# Patient Record
Sex: FEMALE | Race: WHITE | NOT HISPANIC OR LATINO | URBAN - METROPOLITAN AREA
[De-identification: names, ages, dates, MRNs, and addresses within clinical notes are randomized per-mention and may not be internally consistent; named-entity substitution may affect disease eponyms.]

---

## 2019-08-01 ENCOUNTER — INPATIENT (INPATIENT)
Facility: HOSPITAL | Age: 69
LOS: 13 days | Discharge: HOME HEALTH | End: 2019-08-15
Attending: HOSPITALIST | Admitting: HOSPITALIST
Payer: OTHER MISCELLANEOUS

## 2019-08-01 VITALS
WEIGHT: 134.92 LBS | TEMPERATURE: 98 F | SYSTOLIC BLOOD PRESSURE: 184 MMHG | RESPIRATION RATE: 18 BRPM | HEART RATE: 68 BPM | HEIGHT: 63 IN | OXYGEN SATURATION: 99 % | DIASTOLIC BLOOD PRESSURE: 74 MMHG

## 2019-08-01 LAB
ALBUMIN SERPL ELPH-MCNC: 3.8 G/DL — SIGNIFICANT CHANGE UP (ref 3.5–5.2)
ALP SERPL-CCNC: 120 U/L — HIGH (ref 30–115)
ALT FLD-CCNC: 9 U/L — SIGNIFICANT CHANGE UP (ref 0–41)
ANION GAP SERPL CALC-SCNC: 17 MMOL/L — HIGH (ref 7–14)
ANION GAP SERPL CALC-SCNC: 18 MMOL/L — HIGH (ref 7–14)
APTT BLD: 23.9 SEC — CRITICAL LOW (ref 27–39.2)
AST SERPL-CCNC: 23 U/L — SIGNIFICANT CHANGE UP (ref 0–41)
B-OH-BUTYR SERPL-SCNC: 0.6 MMOL/L — HIGH
BASE EXCESS BLDV CALC-SCNC: 5.8 MMOL/L — HIGH (ref -2–2)
BASOPHILS # BLD AUTO: 0.03 K/UL — SIGNIFICANT CHANGE UP (ref 0–0.2)
BASOPHILS NFR BLD AUTO: 0.3 % — SIGNIFICANT CHANGE UP (ref 0–1)
BILIRUB SERPL-MCNC: 0.3 MG/DL — SIGNIFICANT CHANGE UP (ref 0.2–1.2)
BLD GP AB SCN SERPL QL: SIGNIFICANT CHANGE UP
BUN SERPL-MCNC: 21 MG/DL — HIGH (ref 10–20)
BUN SERPL-MCNC: 24 MG/DL — HIGH (ref 10–20)
CA-I SERPL-SCNC: 1.23 MMOL/L — SIGNIFICANT CHANGE UP (ref 1.12–1.3)
CALCIUM SERPL-MCNC: 10.1 MG/DL — SIGNIFICANT CHANGE UP (ref 8.5–10.1)
CALCIUM SERPL-MCNC: 9.6 MG/DL — SIGNIFICANT CHANGE UP (ref 8.5–10.1)
CHLORIDE SERPL-SCNC: 90 MMOL/L — LOW (ref 98–110)
CHLORIDE SERPL-SCNC: 95 MMOL/L — LOW (ref 98–110)
CO2 SERPL-SCNC: 21 MMOL/L — SIGNIFICANT CHANGE UP (ref 17–32)
CO2 SERPL-SCNC: 25 MMOL/L — SIGNIFICANT CHANGE UP (ref 17–32)
CREAT SERPL-MCNC: 1.1 MG/DL — SIGNIFICANT CHANGE UP (ref 0.7–1.5)
CREAT SERPL-MCNC: 1.1 MG/DL — SIGNIFICANT CHANGE UP (ref 0.7–1.5)
EOSINOPHIL # BLD AUTO: 0.12 K/UL — SIGNIFICANT CHANGE UP (ref 0–0.7)
EOSINOPHIL NFR BLD AUTO: 1.2 % — SIGNIFICANT CHANGE UP (ref 0–8)
GAS PNL BLDV: 135 MMOL/L — LOW (ref 136–145)
GAS PNL BLDV: SIGNIFICANT CHANGE UP
GLUCOSE BLDC GLUCOMTR-MCNC: 225 MG/DL — HIGH (ref 70–99)
GLUCOSE BLDC GLUCOMTR-MCNC: 347 MG/DL — HIGH (ref 70–99)
GLUCOSE BLDC GLUCOMTR-MCNC: 441 MG/DL — HIGH (ref 70–99)
GLUCOSE SERPL-MCNC: 274 MG/DL — HIGH (ref 70–99)
GLUCOSE SERPL-MCNC: 553 MG/DL — CRITICAL HIGH (ref 70–99)
HCO3 BLDV-SCNC: 33 MMOL/L — HIGH (ref 22–29)
HCT VFR BLD CALC: 34.7 % — LOW (ref 37–47)
HCT VFR BLDA CALC: 41.2 % — SIGNIFICANT CHANGE UP (ref 34–44)
HGB BLD CALC-MCNC: 13.4 G/DL — LOW (ref 14–18)
HGB BLD-MCNC: 11.8 G/DL — LOW (ref 12–16)
IMM GRANULOCYTES NFR BLD AUTO: 1.6 % — HIGH (ref 0.1–0.3)
INR BLD: 0.97 RATIO — SIGNIFICANT CHANGE UP (ref 0.65–1.3)
INR BLD: 1.1 RATIO — SIGNIFICANT CHANGE UP (ref 0.65–1.3)
LACTATE BLDV-MCNC: 0.9 MMOL/L — SIGNIFICANT CHANGE UP (ref 0.5–1.6)
LYMPHOCYTES # BLD AUTO: 1.13 K/UL — LOW (ref 1.2–3.4)
LYMPHOCYTES # BLD AUTO: 11.1 % — LOW (ref 20.5–51.1)
MCHC RBC-ENTMCNC: 27.1 PG — SIGNIFICANT CHANGE UP (ref 27–31)
MCHC RBC-ENTMCNC: 34 G/DL — SIGNIFICANT CHANGE UP (ref 32–37)
MCV RBC AUTO: 79.6 FL — LOW (ref 81–99)
MONOCYTES # BLD AUTO: 0.39 K/UL — SIGNIFICANT CHANGE UP (ref 0.1–0.6)
MONOCYTES NFR BLD AUTO: 3.8 % — SIGNIFICANT CHANGE UP (ref 1.7–9.3)
NEUTROPHILS # BLD AUTO: 8.33 K/UL — HIGH (ref 1.4–6.5)
NEUTROPHILS NFR BLD AUTO: 82 % — HIGH (ref 42.2–75.2)
NRBC # BLD: 0 /100 WBCS — SIGNIFICANT CHANGE UP (ref 0–0)
PCO2 BLDV: 56 MMHG — HIGH (ref 41–51)
PH BLDV: 7.38 — SIGNIFICANT CHANGE UP (ref 7.26–7.43)
PLATELET # BLD AUTO: 357 K/UL — SIGNIFICANT CHANGE UP (ref 130–400)
PO2 BLDV: 23 MMHG — SIGNIFICANT CHANGE UP (ref 20–40)
POTASSIUM BLDV-SCNC: 3.4 MMOL/L — SIGNIFICANT CHANGE UP (ref 3.3–5.6)
POTASSIUM SERPL-MCNC: 3.5 MMOL/L — SIGNIFICANT CHANGE UP (ref 3.5–5)
POTASSIUM SERPL-MCNC: 4.3 MMOL/L — SIGNIFICANT CHANGE UP (ref 3.5–5)
POTASSIUM SERPL-SCNC: 3.5 MMOL/L — SIGNIFICANT CHANGE UP (ref 3.5–5)
POTASSIUM SERPL-SCNC: 4.3 MMOL/L — SIGNIFICANT CHANGE UP (ref 3.5–5)
PROT SERPL-MCNC: 8 G/DL — SIGNIFICANT CHANGE UP (ref 6–8)
PROTHROM AB SERPL-ACNC: 11.2 SEC — SIGNIFICANT CHANGE UP (ref 9.95–12.87)
PROTHROM AB SERPL-ACNC: 12.6 SEC — SIGNIFICANT CHANGE UP (ref 9.95–12.87)
RBC # BLD: 4.36 M/UL — SIGNIFICANT CHANGE UP (ref 4.2–5.4)
RBC # FLD: 12.3 % — SIGNIFICANT CHANGE UP (ref 11.5–14.5)
SAO2 % BLDV: 37 % — SIGNIFICANT CHANGE UP
SODIUM SERPL-SCNC: 132 MMOL/L — LOW (ref 135–146)
SODIUM SERPL-SCNC: 134 MMOL/L — LOW (ref 135–146)
WBC # BLD: 10.16 K/UL — SIGNIFICANT CHANGE UP (ref 4.8–10.8)
WBC # FLD AUTO: 10.16 K/UL — SIGNIFICANT CHANGE UP (ref 4.8–10.8)

## 2019-08-01 PROCEDURE — 72170 X-RAY EXAM OF PELVIS: CPT | Mod: 26

## 2019-08-01 PROCEDURE — 99231 SBSQ HOSP IP/OBS SF/LOW 25: CPT

## 2019-08-01 PROCEDURE — 99285 EMERGENCY DEPT VISIT HI MDM: CPT

## 2019-08-01 PROCEDURE — 73552 X-RAY EXAM OF FEMUR 2/>: CPT | Mod: 26,LT

## 2019-08-01 PROCEDURE — 93010 ELECTROCARDIOGRAM REPORT: CPT

## 2019-08-01 PROCEDURE — 71045 X-RAY EXAM CHEST 1 VIEW: CPT | Mod: 26

## 2019-08-01 PROCEDURE — 73562 X-RAY EXAM OF KNEE 3: CPT | Mod: 26,LT

## 2019-08-01 RX ORDER — INSULIN GLARGINE 100 [IU]/ML
15 INJECTION, SOLUTION SUBCUTANEOUS AT BEDTIME
Refills: 0 | Status: DISCONTINUED | OUTPATIENT
Start: 2019-08-01 | End: 2019-08-03

## 2019-08-01 RX ORDER — LANOLIN ALCOHOL/MO/W.PET/CERES
5 CREAM (GRAM) TOPICAL AT BEDTIME
Refills: 0 | Status: DISCONTINUED | OUTPATIENT
Start: 2019-08-01 | End: 2019-08-03

## 2019-08-01 RX ORDER — DEXTROSE 50 % IN WATER 50 %
25 SYRINGE (ML) INTRAVENOUS ONCE
Refills: 0 | Status: DISCONTINUED | OUTPATIENT
Start: 2019-08-01 | End: 2019-08-03

## 2019-08-01 RX ORDER — CHLORHEXIDINE GLUCONATE 213 G/1000ML
1 SOLUTION TOPICAL
Refills: 0 | Status: DISCONTINUED | OUTPATIENT
Start: 2019-08-01 | End: 2019-08-03

## 2019-08-01 RX ORDER — MORPHINE SULFATE 50 MG/1
4 CAPSULE, EXTENDED RELEASE ORAL ONCE
Refills: 0 | Status: DISCONTINUED | OUTPATIENT
Start: 2019-08-01 | End: 2019-08-01

## 2019-08-01 RX ORDER — INSULIN HUMAN 100 [IU]/ML
6 INJECTION, SOLUTION SUBCUTANEOUS ONCE
Refills: 0 | Status: COMPLETED | OUTPATIENT
Start: 2019-08-01 | End: 2019-08-01

## 2019-08-01 RX ORDER — SODIUM CHLORIDE 9 MG/ML
1000 INJECTION INTRAMUSCULAR; INTRAVENOUS; SUBCUTANEOUS
Refills: 0 | Status: DISCONTINUED | OUTPATIENT
Start: 2019-08-01 | End: 2019-08-03

## 2019-08-01 RX ORDER — INSULIN LISPRO 100/ML
8 VIAL (ML) SUBCUTANEOUS ONCE
Refills: 0 | Status: COMPLETED | OUTPATIENT
Start: 2019-08-01 | End: 2019-08-01

## 2019-08-01 RX ORDER — ALPRAZOLAM 0.25 MG
0.25 TABLET ORAL THREE TIMES A DAY
Refills: 0 | Status: DISCONTINUED | OUTPATIENT
Start: 2019-08-01 | End: 2019-08-03

## 2019-08-01 RX ORDER — INSULIN LISPRO 100/ML
5 VIAL (ML) SUBCUTANEOUS
Refills: 0 | Status: DISCONTINUED | OUTPATIENT
Start: 2019-08-01 | End: 2019-08-03

## 2019-08-01 RX ORDER — CODEINE SULFATE 60 MG/1
30 TABLET ORAL ONCE
Refills: 0 | Status: DISCONTINUED | OUTPATIENT
Start: 2019-08-01 | End: 2019-08-01

## 2019-08-01 RX ORDER — ASPIRIN/CALCIUM CARB/MAGNESIUM 324 MG
81 TABLET ORAL DAILY
Refills: 0 | Status: DISCONTINUED | OUTPATIENT
Start: 2019-08-01 | End: 2019-08-03

## 2019-08-01 RX ORDER — AMPICILLIN SODIUM AND SULBACTAM SODIUM 250; 125 MG/ML; MG/ML
3 INJECTION, POWDER, FOR SUSPENSION INTRAMUSCULAR; INTRAVENOUS EVERY 6 HOURS
Refills: 0 | Status: DISCONTINUED | OUTPATIENT
Start: 2019-08-01 | End: 2019-08-03

## 2019-08-01 RX ORDER — SODIUM CHLORIDE 9 MG/ML
1000 INJECTION, SOLUTION INTRAVENOUS
Refills: 0 | Status: DISCONTINUED | OUTPATIENT
Start: 2019-08-01 | End: 2019-08-03

## 2019-08-01 RX ORDER — DEXTROSE 50 % IN WATER 50 %
12.5 SYRINGE (ML) INTRAVENOUS ONCE
Refills: 0 | Status: DISCONTINUED | OUTPATIENT
Start: 2019-08-01 | End: 2019-08-03

## 2019-08-01 RX ORDER — DEXTROSE 50 % IN WATER 50 %
15 SYRINGE (ML) INTRAVENOUS ONCE
Refills: 0 | Status: DISCONTINUED | OUTPATIENT
Start: 2019-08-01 | End: 2019-08-03

## 2019-08-01 RX ORDER — HEPARIN SODIUM 5000 [USP'U]/ML
5000 INJECTION INTRAVENOUS; SUBCUTANEOUS EVERY 12 HOURS
Refills: 0 | Status: DISCONTINUED | OUTPATIENT
Start: 2019-08-01 | End: 2019-08-03

## 2019-08-01 RX ORDER — SODIUM CHLORIDE 9 MG/ML
1000 INJECTION, SOLUTION INTRAVENOUS
Refills: 0 | Status: DISCONTINUED | OUTPATIENT
Start: 2019-08-01 | End: 2019-08-01

## 2019-08-01 RX ORDER — MORPHINE SULFATE 50 MG/1
2 CAPSULE, EXTENDED RELEASE ORAL ONCE
Refills: 0 | Status: DISCONTINUED | OUTPATIENT
Start: 2019-08-01 | End: 2019-08-01

## 2019-08-01 RX ORDER — AMPICILLIN SODIUM AND SULBACTAM SODIUM 250; 125 MG/ML; MG/ML
3 INJECTION, POWDER, FOR SUSPENSION INTRAMUSCULAR; INTRAVENOUS ONCE
Refills: 0 | Status: COMPLETED | OUTPATIENT
Start: 2019-08-01 | End: 2019-08-01

## 2019-08-01 RX ORDER — GLUCAGON INJECTION, SOLUTION 0.5 MG/.1ML
1 INJECTION, SOLUTION SUBCUTANEOUS ONCE
Refills: 0 | Status: DISCONTINUED | OUTPATIENT
Start: 2019-08-01 | End: 2019-08-03

## 2019-08-01 RX ORDER — INSULIN LISPRO 100/ML
6 VIAL (ML) SUBCUTANEOUS ONCE
Refills: 0 | Status: COMPLETED | OUTPATIENT
Start: 2019-08-01 | End: 2019-08-01

## 2019-08-01 RX ADMIN — INSULIN HUMAN 6 UNIT(S): 100 INJECTION, SOLUTION SUBCUTANEOUS at 14:41

## 2019-08-01 RX ADMIN — MORPHINE SULFATE 2 MILLIGRAM(S): 50 CAPSULE, EXTENDED RELEASE ORAL at 17:30

## 2019-08-01 RX ADMIN — MORPHINE SULFATE 2 MILLIGRAM(S): 50 CAPSULE, EXTENDED RELEASE ORAL at 20:11

## 2019-08-01 RX ADMIN — MORPHINE SULFATE 4 MILLIGRAM(S): 50 CAPSULE, EXTENDED RELEASE ORAL at 11:25

## 2019-08-01 RX ADMIN — SODIUM CHLORIDE 80 MILLILITER(S): 9 INJECTION INTRAMUSCULAR; INTRAVENOUS; SUBCUTANEOUS at 17:35

## 2019-08-01 RX ADMIN — Medication 6 UNIT(S): at 17:32

## 2019-08-01 RX ADMIN — AMPICILLIN SODIUM AND SULBACTAM SODIUM 200 GRAM(S): 250; 125 INJECTION, POWDER, FOR SUSPENSION INTRAMUSCULAR; INTRAVENOUS at 19:59

## 2019-08-01 RX ADMIN — AMPICILLIN SODIUM AND SULBACTAM SODIUM 200 GRAM(S): 250; 125 INJECTION, POWDER, FOR SUSPENSION INTRAMUSCULAR; INTRAVENOUS at 12:07

## 2019-08-01 RX ADMIN — SODIUM CHLORIDE 125 MILLILITER(S): 9 INJECTION, SOLUTION INTRAVENOUS at 13:35

## 2019-08-01 RX ADMIN — Medication 8 UNIT(S): at 15:06

## 2019-08-01 RX ADMIN — SODIUM CHLORIDE 75 MILLILITER(S): 9 INJECTION, SOLUTION INTRAVENOUS at 15:06

## 2019-08-01 RX ADMIN — Medication 110 MILLIGRAM(S): at 17:31

## 2019-08-01 RX ADMIN — INSULIN GLARGINE 15 UNIT(S): 100 INJECTION, SOLUTION SUBCUTANEOUS at 22:13

## 2019-08-01 RX ADMIN — Medication 5 UNIT(S): at 17:32

## 2019-08-01 RX ADMIN — SODIUM CHLORIDE 125 MILLILITER(S): 9 INJECTION, SOLUTION INTRAVENOUS at 15:08

## 2019-08-01 RX ADMIN — HEPARIN SODIUM 5000 UNIT(S): 5000 INJECTION INTRAVENOUS; SUBCUTANEOUS at 17:36

## 2019-08-01 RX ADMIN — Medication 0.25 MILLIGRAM(S): at 15:33

## 2019-08-01 NOTE — ED PROVIDER NOTE - CARE PLAN
Principal Discharge DX:	Subtrochanteric fracture of left femur  Secondary Diagnosis:	Hyperglycemia Principal Discharge DX:	Subtrochanteric fracture of left femur  Secondary Diagnosis:	Hyperglycemia  Secondary Diagnosis:	Abscess of right leg

## 2019-08-01 NOTE — ED ADULT NURSE NOTE - OBJECTIVE STATEMENT
Patient stated she fell recently and is c/o right hip pain. Patient denies Head trauma LOC N/V CP SOB fevers Chills and urinary Symptoms

## 2019-08-01 NOTE — ED ADULT TRIAGE NOTE - CHIEF COMPLAINT QUOTE
as per ems she fell on the street corner and is having left hip pain. pt denies loc pt denies anticoagulants. pt cannot but any pressure on hip

## 2019-08-01 NOTE — ED PROVIDER NOTE - NS ED ROS FT
Eyes:  No visual changes, eye pain or discharge.  ENMT:  No hearing changes, pain, no sore throat or runny nose, no difficulty swallowing  Cardiac:  No chest pain, SOB or edema. No chest pain with exertion.  Respiratory:  No cough or respiratory distress. No hemoptysis. No history of asthma or RAD.  GI:  No nausea, vomiting, diarrhea or abdominal pain.  :  No dysuria, frequency or burning.  MS:  No myalgia, muscle weakness,  back pain. + joint pain  Neuro:  No headache or weakness.  No LOC.  Skin:  No skin rash.   Endocrine: No history of thyroid disease or diabetes.

## 2019-08-01 NOTE — H&P ADULT - NSHPATTENDINGPLANDISCUSS_GEN_ALL_CORE
Explaining care with patient, and hospital course, and discussing diagnosis/orhto/pt/family/RN/resident

## 2019-08-01 NOTE — ED ADULT NURSE NOTE - NSIMPLEMENTINTERV_GEN_ALL_ED
Implemented All Fall with Harm Risk Interventions:  [place] to call system. Call bell, personal items and telephone within reach. Instruct patient to call for assistance. Room bathroom lighting operational. Non-slip footwear when patient is off stretcher. Physically safe environment: no spills, clutter or unnecessary equipment. Stretcher in lowest position, wheels locked, appropriate side rails in place. Provide visual cue, wrist band, yellow gown, etc. Monitor gait and stability. Monitor for mental status changes and reorient to person, place, and time. Review medications for side effects contributing to fall risk. Reinforce activity limits and safety measures with patient and family. Provide visual clues: red socks.

## 2019-08-01 NOTE — H&P ADULT - ASSESSMENT
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     # S/p mechanical fall, with left hip fracture reported  - xr of hip shows Acute left femoral subtrochanteric fracture with 7 mm diastases.  - ortho consulted,   - CXR, 2d echo ordered for preop  - cardio consulted for stratification   - strict bed rest  - pain management  - NPO after midnight with IV Fluids for possible OR tomorrow for Left hip ORIF    # Right hip groin infection, cellulitis  - c/w unasyn q6h    # DM2, uncontrolled, not on home meds  - will start insulin/lispro/lantus  - blood glucose monitoring  - hba1c ordered    # ho of no medical examination in ten years  - routine bloodwork ordered  - tsh/lipid panel    Activity: bedrest  diet: npo midnight, carb consistent for now  dvt ppx: heparin 5k bid  gi ppx: not indicated  full code  dispo: from home, will need pt/rehab oncedischarged

## 2019-08-01 NOTE — ED PROVIDER NOTE - CLINICAL SUMMARY MEDICAL DECISION MAKING FREE TEXT BOX
Pt with hip fx after fall.  Also with right groin abscess with minimal erythema and draining for days.  Ortho eval in ED.  Pt found to have elevated BS with NL ph.  Fluids ordered.  Pt adm for cont eval and tx.

## 2019-08-01 NOTE — CONSULT NOTE ADULT - SUBJECTIVE AND OBJECTIVE BOX
Called to evaluate  s/p mechanical fall today in ED c/o Right hip pain and inability to ambulate. Denies any other injuries or c/o.  PMHx: HTN, dementia, permanent pacemaker.    on PE: Right LE shortmentd, externally rotated, decreased ROM secondary to pain, no decrased sensation, dorsi/plantarflexes ankles, no calf tenderness, DP pulse 2+.    X-ray: Right hip IT fracture     A/P:  - patient will benefit from admissiom to [facility] services;  - pre-op labs: T&S, CBC, SMA7, PT/INR/PTT;  - CXR;  - strict bed rest;  - Castellon catheter;  - pain management; Called to evaluate a 69 y/o female s/p mechanical fall today, in ED c/o Left hip pain and inability to ambulate. Also reports an abscess in her Right groin that she has been self-medicating with Amoxicillin 850mg X 8 day prior to fall. Does not have a Primary doctor and has not been seen by one for several years.  Self medicating with Furosemide. Reports occasional gait issues with bilateral leg "stiffness."  Denies any other injuries or c/o.    PMHx: CAD, cardiomyopathy, pre-diabetis.    Allergies: NKDA    on PE: Left LE shortened, externally rotated, decreased ROM secondary to pain, no decreased sensation, dorsi/plantarflexes ankles, no calf tenderness, DP pulse 2+.    X-ray: Left hip IT fracture;    A/P:  - patient will benefit from admissiom to [facility] services;  - pre-op labs: T&S, CBC, SMA7, PT/INR/PTT;  - CXR;  - strict bed rest;  - Castellon catheter;  - pain management; Called to evaluate a 67 y/o female s/p mechanical fall today, in ED c/o Left hip pain and inability to ambulate. Also reports an abscess in her Right groin that she has been self-medicating with Amoxicillin 850mg X 8 day prior to fall. Does not have a Primary doctor and has not been seen by one for several years. Mention  as her cardiologist. Self medicating with Furosemide. Reports occasional gait issues with bilateral leg "stiffness and disbalance."  Denies any fever, other injuries or c/o.    PMHx: CAD, cardiomyopathy, pre-diabetis.    Allergies: NKDA    Vital Signs Last 24 Hrs  T(C): 36.6 (01 Aug 2019 09:48), Max: 36.6 (01 Aug 2019 09:48)  T(F): 97.8 (01 Aug 2019 09:48), Max: 97.8 (01 Aug 2019 09:48)  HR: 68 (01 Aug 2019 09:48) (68 - 68)  BP: 184/74 (01 Aug 2019 09:48) (184/74 - 184/74)  BP(mean): --  RR: 18 (01 Aug 2019 09:48) (18 - 18)  SpO2: 99% (01 Aug 2019 09:48) (99% - 99%)    on PE:  Left LE: shortened, externally rotated, decreased ROM secondary to pain, no decreased sensation, dorsi/plantarflexes ankles, no calf tenderness, DP pulse 2+.  Right groin: + resolved abscess opening approximately 7mm with active serous discharge and surrounding erythema.                          11.8   10.16 )-----------( 357      ( 01 Aug 2019 11:11 )             34.7   08-01    132<L>  |  90<L>  |  24<H>  ----------------------------<  553<HH>  4.3   |  25  |  1.1    Ca    10.1      01 Aug 2019 11:11    TPro  8.0  /  Alb  3.8  /  TBili  0.3  /  DBili  x   /  AST  23  /  ALT  9   /  AlkPhos  120<H>  08-01    PT/INR - ( 01 Aug 2019 11:11 )   PT: 11.20 sec;   INR: 0.97 ratio         PTT - ( 01 Aug 2019 11:11 )  PTT:23.9 sec- pre-op labs: T&S, CBC, SMA7,    X-ray: Left hip IT fracture;    A/P:  - patient will benefit from admission to Medical service;  - CXR and ECHOcardiogram;  - strict bed rest;  - pain management;  - address abscess Right groin with IV antibiotics;  - Consult Cardiology () for cardiac pre-op clearance;  - Medical pre-op optimization and clearance;  - NPO after midnight with IV Fluids for possible OR tomorrow for Left hip ORIF;  - case d/w Ortho attending and evaluation to follow. Called to evaluate a 69 y/o female s/p mechanical fall today, in ED c/o Left hip pain and inability to ambulate. Also reports an abscess in her Right groin that she has been self-medicating with Amoxicillin 850mg X 8 day prior to fall. Does not have a Primary doctor and has not been seen by one for several years. Mention  as her cardiologist. Self medicating with Furosemide. Reports occasional gait issues with bilateral leg "stiffness and disbalance."  Denies any fever, other injuries or c/o.    PMHx: CAD, cardiomyopathy, pre-diabetis.    Allergies: NKDA    Vital Signs Last 24 Hrs  T(C): 36.6 (01 Aug 2019 09:48), Max: 36.6 (01 Aug 2019 09:48)  T(F): 97.8 (01 Aug 2019 09:48), Max: 97.8 (01 Aug 2019 09:48)  HR: 68 (01 Aug 2019 09:48) (68 - 68)  BP: 184/74 (01 Aug 2019 09:48) (184/74 - 184/74)  BP(mean): --  RR: 18 (01 Aug 2019 09:48) (18 - 18)  SpO2: 99% (01 Aug 2019 09:48) (99% - 99%)    on PE:  Left LE: shortened, externally rotated, decreased ROM secondary to pain, no decreased sensation, dorsi/plantarflexes ankles, no calf tenderness, DP pulse 2+.  Right groin: + resolved abscess opening approximately 7mm with active serous discharge and surrounding erythema.                          11.8   10.16 )-----------( 357      ( 01 Aug 2019 11:11 )             34.7   08-01    132<L>  |  90<L>  |  24<H>  ----------------------------<  553<HH>  4.3   |  25  |  1.1    Ca    10.1      01 Aug 2019 11:11    TPro  8.0  /  Alb  3.8  /  TBili  0.3  /  DBili  x   /  AST  23  /  ALT  9   /  AlkPhos  120<H>  08-01    PT/INR - ( 01 Aug 2019 11:11 )   PT: 11.20 sec;   INR: 0.97 ratio         PTT - ( 01 Aug 2019 11:11 )  PTT:23.9 sec-     X-ray: Left hip IT fracture;    A/P:  - patient will benefit from admission to Medical service;  - CXR and ECHOcardiogram;  - strict bed rest;  - pain management;  - address abscess Right groin with IV antibiotics;  - Consult Cardiology () for cardiac pre-op clearance;  - Medical pre-op optimization and clearance;  - NPO after midnight with IV Fluids for possible OR tomorrow for Left hip ORIF;  - case d/w Ortho attending and evaluation to follow.

## 2019-08-01 NOTE — ED PROVIDER NOTE - PHYSICAL EXAMINATION
CONSTITUTIONAL: Well-developed; well-nourished; in no acute distress. gcs 15, speaking in full sentences, moving all extremities  SKIN: warm, dry  HEAD: Normocephalic; see above  EYES: PERRL, EOMI, no conjunctival erythema  ENT: No nasal discharge; airway clear, mucous membranes moist  NECK: supple, nontender  CARD: +S1, S2 no murmurs, gallops, or rubs. Regular rate and rhythm. radial/pedal  2+ b/l, no chest wall tenderness or crepitus  RESP: No wheezes, rales or rhonchi. CTABL  ABD: soft ntnd, no rebound, no guarding, no rigidity  EXT: moves all extremities,  No clubbing, cyanosis or edema. Tenderness to palpation of the proximal L. femur. Pelvis stable.   NEURO: Alert, oriented, grossly unremarkable, cn ii-xii grossly intact, follows commands  PSYCH: Cooperative, appropriate. CONSTITUTIONAL: Well-developed; well-nourished; in no acute distress. gcs 15, speaking in full sentences, moving all extremities  SKIN: warm, dry. draining abscess in R. inguinal region  HEAD: Normocephalic; see above  EYES: PERRL, EOMI, no conjunctival erythema  ENT: No nasal discharge; airway clear, mucous membranes moist  NECK: supple, nontender  CARD: +S1, S2 no murmurs, gallops, or rubs. Regular rate and rhythm. radial/pedal  2+ b/l, no chest wall tenderness or crepitus  RESP: No wheezes, rales or rhonchi. CTABL  ABD: soft ntnd, no rebound, no guarding, no rigidity  EXT: moves all extremities,  No clubbing, cyanosis or edema. Tenderness to palpation of the proximal L. femur. Pelvis stable.   NEURO: Alert, oriented, grossly unremarkable, cn ii-xii grossly intact, follows commands  PSYCH: Cooperative, appropriate.

## 2019-08-01 NOTE — PROCEDURE NOTE - NSPOSTCAREGUIDE_GEN_A_CORE
Verbal/written post procedure instructions were given to patient/caregiver/Keep the cast/splint/dressing clean and dry/Instructed patient/caregiver to follow-up with primary care physician/Instructed patient/caregiver regarding signs and symptoms of infection

## 2019-08-01 NOTE — CONSULT NOTE ADULT - ASSESSMENT
68F with right sided groin abscess spontaneously draining    Plan:  Extend groin incision  Pack wound  Send cultures  IV abx    Plan discussed with Dr. Mota

## 2019-08-01 NOTE — CONSULT NOTE ADULT - SUBJECTIVE AND OBJECTIVE BOX
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  As per patient she was walking and tripped on her feet.  She fell and did not hit her head, did not loose consciousness and did not having any incontinence.  Her brother who is at bedside explains that for a long time he has noted that his sister, the patient has ambulatory issues. In the ED patient was complaining of Left hip pain and inability to ambulate. Also reports an abscess in her Right groin that she has been self-medicating with Amoxicillin 850mg X 8 day prior to fall.  Although the patient mentions she has no primary doctor she does explain that she has seen eugenia chapa in the past.  She does not recall any visits to the doctor however within the last 10 years.   Self medicating with Furosemide occasionally when she feels bloated. Reports occasional gait issues with bilateral leg "stiffness and imbalance  Denies any fever, chills, nausea, vomiting, headache, abd pain, chest pain, lower extremity swelling.  No recent sick contacts or decrease in po intake. Surgical consult was placed for evaluation of right groin abscess      PAST MEDICAL & SURGICAL HISTORY:  DM  Cardiomyopathy        MEDICATIONS  (STANDING):  ampicillin/sulbactam  IVPB 3 Gram(s) IV Intermittent every 6 hours  aspirin  chewable 81 milliGRAM(s) Oral daily  chlorhexidine 4% Liquid 1 Application(s) Topical <User Schedule>  dextrose 5%. 1000 milliLiter(s) (50 mL/Hr) IV Continuous <Continuous>  dextrose 50% Injectable 12.5 Gram(s) IV Push once  dextrose 50% Injectable 25 Gram(s) IV Push once  dextrose 50% Injectable 25 Gram(s) IV Push once  doxycycline IVPB      heparin  Injectable 5000 Unit(s) SubCutaneous every 12 hours  insulin glargine Injectable (LANTUS) 15 Unit(s) SubCutaneous at bedtime  insulin lispro Injectable (HumaLOG) 5 Unit(s) SubCutaneous before breakfast  insulin lispro Injectable (HumaLOG) 5 Unit(s) SubCutaneous before lunch  insulin lispro Injectable (HumaLOG) 5 Unit(s) SubCutaneous before dinner  sodium chloride 0.9%. 1000 milliLiter(s) (80 mL/Hr) IV Continuous <Continuous>    MEDICATIONS  (PRN):  ALPRAZolam 0.25 milliGRAM(s) Oral three times a day PRN anxiety  dextrose 40% Gel 15 Gram(s) Oral once PRN Blood Glucose LESS THAN 70 milliGRAM(s)/deciliter  glucagon  Injectable 1 milliGRAM(s) IntraMuscular once PRN Glucose LESS THAN 70 milligrams/deciliter      Allergies    No Known Allergies    Intolerances        REVIEW OF SYSTEMS    [X] A ten-point review of systems was otherwise negative except as noted.  [ ] Due to altered mental status/intubation, subjective information were not able to be obtained from the patient. History was obtained, to the extent possible, from review of the chart and collateral sources of information.      Vital Signs Last 24 Hrs  T(C): 36.4 (01 Aug 2019 16:00), Max: 36.6 (01 Aug 2019 09:48)  T(F): 97.5 (01 Aug 2019 16:00), Max: 97.8 (01 Aug 2019 09:48)  HR: 73 (01 Aug 2019 16:00) (68 - 73)  BP: 141/64 (01 Aug 2019 16:00) (141/64 - 184/74)  BP(mean): --  RR: 18 (01 Aug 2019 16:00) (18 - 18)  SpO2: 99% (01 Aug 2019 16:00) (99% - 99%)    PHYSICAL EXAM:  GENERAL: NAD, well-appearing  CHEST/LUNG: Clear to auscultation bilaterally  HEART: Regular rate and rhythm  ABDOMEN: Soft, Nontender, Nondistended;   EXTREMITIES:  No clubbing, cyanosis, or edema  Groin - small abscess currently self draining via small opening      LABS:  Labs:  CAPILLARY BLOOD GLUCOSE      POCT Blood Glucose.: 347 mg/dL (01 Aug 2019 16:51)  POCT Blood Glucose.: 441 mg/dL (01 Aug 2019 13:55)                          11.8   10.16 )-----------( 357      ( 01 Aug 2019 11:11 )             34.7       Auto Neutrophil %: 82.0 % (08-01-19 @ 11:11)  Auto Immature Granulocyte %: 1.6 % (08-01-19 @ 11:11)    08-01    134<L>  |  95<L>  |  21<H>  ----------------------------<  274<H>  3.5   |  21  |  1.1      Calcium, Total Serum: 9.6 mg/dL (08-01-19 @ 18:39)      LFTs:             8.0  | 0.3  | 23       ------------------[120     ( 01 Aug 2019 11:11 )  3.8  | x    | 9           Lipase:x      Amylase:x         Blood Gas Venous - Lactate: 0.9 mmoL/L (08-01-19 @ 12:17)      Coags:     11.20  ----< 0.97    ( 01 Aug 2019 11:11 )     23.9            RADIOLOGY & ADDITIONAL STUDIES:  < from: Xray Femur 2 Views, Left (08.01.19 @ 12:12) >  Acute left femoral subtrochanteric fracture with 7 mm diastases.    < end of copied text >

## 2019-08-01 NOTE — ED PROVIDER NOTE - OBJECTIVE STATEMENT
68y F w/ no sig PMH presents with L. thigh pain after falling on extremity earlier today. States her foot got stuck on the curb, twisted, and then fell on her L. side. Did not hit head. No LOC. Is unable to move extremity. Denies fever, chills, headache, CP, SOB, n/v/d, abd pain, or urinary/bowel incontinence. 68y F w/ PMH of DM presents with L. thigh pain after falling on extremity earlier today. States her foot got stuck on the curb, twisted, and then fell on her L. side. Did not hit head. No LOC. Is unable to move extremity. Denies fever, chills, headache, CP, SOB, n/v/d, abd pain, or urinary/bowel incontinence.

## 2019-08-01 NOTE — H&P ADULT - ATTENDING COMMENTS
Patient is 69 yo female with hx of Viral myopathy, DM2, and non-compliance  presenting with     1.  Left hip fracture  -Continue with pain management, and further care as per ortho  -TTE, and cardiology consult for medical clearance  -discussed case with ortho, and in agreement to await Blood culture and for infection to clear prior to surgery    2. Right ingrown abscess  -Continue with unasyn, and will add doxycycline pending wound culture, and Blood culture      3.  DM2  -Continue with lantus, and ISS.  Monitor POC

## 2019-08-01 NOTE — CONSULT NOTE ADULT - SUBJECTIVE AND OBJECTIVE BOX
HPI:  67 y/o female w/ pmh of DM2 and non-ischemic cardiomyopathy 12 years ago, paroxysmal A-fib, anxiety, presents s/p mechanical.  As per patient she was walking and tripped on her feet.  She fell and did not hit her head, did not lose consciousness and did not having any incontinence.  Her son who is at bedside explains that for a long time he has noted that his mother, the patient has ambulatory issues. In the ED patient was complaining of Left hip pain and inability to ambulate. Also reports an abscess in her Right groin that she has been self-medicating with Amoxicillin 850mg X 8 day prior to fall.  Although the patient mentions she has no primary doctor she does explain that she has seen dr. Barbosa in the past.  She does not recall any visits to the doctor however within the last 10 years.  Self medicating with Furosemide occasionally when she feels bloated. Reports occasional gait issues with bilateral leg "stiffness and imbalance."  Denies any fever, chills, nausea, vomiting, headache, abd pain, chest pain.  No recent sick contacts. Admits to 15-20 lb weight loss in last "few" months with decreased appetite.      ROS:  All other systems reviewed and are negative    PAST MEDICAL & SURGICAL HISTORY  Non-ischemic cardiomyopathy: Patient reports having EF of 19 at the time. Says she has not followed up since but has returned to baseline  Paroxysmal A-fib not cardioverted- CHADSVASC 4      FAMILY HISTORY:  FAMILY HISTORY: Gastric-cancer- mother      SOCIAL HISTORY:  [+]smoker: 1.5 ppd X 30 years. Quit approx. 12 years ago  []Alcohol  []Drug    ALLERGIES:  No Known Allergies      MEDICATIONS:  MEDICATIONS  (STANDING):  ampicillin/sulbactam  IVPB 3 Gram(s) IV Intermittent every 6 hours  aspirin  chewable 81 milliGRAM(s) Oral daily  chlorhexidine 4% Liquid 1 Application(s) Topical <User Schedule>  dextrose 5%. 1000 milliLiter(s) (50 mL/Hr) IV Continuous <Continuous>  dextrose 50% Injectable 12.5 Gram(s) IV Push once  dextrose 50% Injectable 25 Gram(s) IV Push once  dextrose 50% Injectable 25 Gram(s) IV Push once  heparin  Injectable 5000 Unit(s) SubCutaneous every 12 hours  insulin glargine Injectable (LANTUS) 15 Unit(s) SubCutaneous at bedtime  insulin lispro Injectable (HumaLOG) 5 Unit(s) SubCutaneous before breakfast  insulin lispro Injectable (HumaLOG) 5 Unit(s) SubCutaneous before lunch  insulin lispro Injectable (HumaLOG) 5 Unit(s) SubCutaneous before dinner  insulin lispro Injectable (HumaLOG) 8 Unit(s) SubCutaneous once  lactated ringers 1000 milliLiter(s) (125 mL/Hr) IV Continuous <Continuous>  lactated ringers. 1000 milliLiter(s) (75 mL/Hr) IV Continuous <Continuous>    MEDICATIONS  (PRN):  dextrose 40% Gel 15 Gram(s) Oral once PRN Blood Glucose LESS THAN 70 milliGRAM(s)/deciliter  glucagon  Injectable 1 milliGRAM(s) IntraMuscular once PRN Glucose LESS THAN 70 milligrams/deciliter        HOME MEDICATIONS:  Home Medications:  aspirin 81 mg oral tablet: 1 tab(s) orally once a day (01 Aug 2019 14:37)  ALPRAZolam 0.25 milliGRAM(s) Oral three times a day PRN anxiety      VITALS:   T(F): 97.8 (08-01 @ 09:48), Max: 97.8 (08-01 @ 09:48)  HR: 68 (08-01 @ 09:48) (68 - 68)  BP: 184/74 (08-01 @ 09:48) (184/74 - 184/74)  RR: 18 (08-01 @ 09:48) (18 - 18)  SpO2: 99% (08-01 @ 09:48) (99% - 99%)        PHYSICAL EXAM:  GEN: Alert and oriented X 3, agitated  HEENT: no pallor  NECK: Supple, no JVD  LUNGS: Clear to auscultation bilaterally  CARDIOVASCULAR: S1/S2 regular, holosystolic murmur L sternal boarder   EXT: No Lower extremity edema/cyanosis. Abscess R. groin, healing  NEURO: Non focal  SKIN: Intact    LABS:                        11.8   10.16 )-----------( 357      ( 01 Aug 2019 11:11 )             34.7     08-01    132<L>  |  90<L>  |  24<H>  ----------------------------<  553<HH>  4.3   |  25  |  1.1    Ca    10.1      01 Aug 2019 11:11    TPro  8.0  /  Alb  3.8  /  TBili  0.3  /  DBili  x   /  AST  23  /  ALT  9   /  AlkPhos  120<H>  08-01    PT/INR - ( 01 Aug 2019 11:11 )   PT: 11.20 sec;   INR: 0.97 ratio         PTT - ( 01 Aug 2019 11:11 )  PTT:23.9 sec          Troponin trend: None        Hemoglobin A1C: None  Thyroid: None      RADIOLOGY:  -CXR: < from: Xray Pelvis AP only (08.01.19 @ 12:11) >  Findings/  impression:    Acute left femoral subtrochanteric fracture with 7 mm diastases.     < end of copied text >    -TTE: Pending   -CCTA: None  -STRESS TEST: None  -CATHETERIZATION: None    ECG: Pending    TELEMETRY EVENTS: HPI:  69 y/o female w/ pmh of DM2 and non-ischemic cardiomyopathy 12 years ago, paroxysmal A-fib, anxiety, presents s/p mechanical.  As per patient she was walking and tripped on her feet.  She fell and did not hit her head, did not lose consciousness and did not having any incontinence.  Her son who is at bedside explains that for a long time he has noted that his mother, the patient has ambulatory issues. In the ED patient was complaining of Left hip pain and inability to ambulate. Also reports an abscess in her Right groin that she has been self-medicating with Amoxicillin 850mg X 8 day prior to fall.  Although the patient mentions she has no primary doctor she does explain that she has seen dr. Barbosa in the past.  She does not recall any visits to the doctor however within the last 10 years.  Self medicating with Furosemide occasionally when she feels bloated. Reports occasional gait issues with bilateral leg "stiffness and imbalance."  Denies any fever, chills, nausea, vomiting, headache, abd pain, chest pain.  No recent sick contacts. Admits to 15-20 lb weight loss in last "few" months with decreased appetite.      ROS:  All other systems reviewed and are negative    PAST MEDICAL & SURGICAL HISTORY  Non-ischemic cardiomyopathy: Patient reports having EF of 19 at the time. Says she has not followed up since but has returned to baseline  Paroxysmal A-fib not cardioverted- CHADSVASC 4  Bilateral cataract surgery      FAMILY HISTORY:  FAMILY HISTORY: Gastric-cancer- mother      SOCIAL HISTORY:  [+]smoker: 1.5 ppd X 30 years. Quit approx. 12 years ago  []Alcohol  []Drug    ALLERGIES:  No Known Allergies      MEDICATIONS:  MEDICATIONS  (STANDING):  ampicillin/sulbactam  IVPB 3 Gram(s) IV Intermittent every 6 hours  aspirin  chewable 81 milliGRAM(s) Oral daily  chlorhexidine 4% Liquid 1 Application(s) Topical <User Schedule>  dextrose 5%. 1000 milliLiter(s) (50 mL/Hr) IV Continuous <Continuous>  dextrose 50% Injectable 12.5 Gram(s) IV Push once  dextrose 50% Injectable 25 Gram(s) IV Push once  dextrose 50% Injectable 25 Gram(s) IV Push once  heparin  Injectable 5000 Unit(s) SubCutaneous every 12 hours  insulin glargine Injectable (LANTUS) 15 Unit(s) SubCutaneous at bedtime  insulin lispro Injectable (HumaLOG) 5 Unit(s) SubCutaneous before breakfast  insulin lispro Injectable (HumaLOG) 5 Unit(s) SubCutaneous before lunch  insulin lispro Injectable (HumaLOG) 5 Unit(s) SubCutaneous before dinner  insulin lispro Injectable (HumaLOG) 8 Unit(s) SubCutaneous once  lactated ringers 1000 milliLiter(s) (125 mL/Hr) IV Continuous <Continuous>  lactated ringers. 1000 milliLiter(s) (75 mL/Hr) IV Continuous <Continuous>    MEDICATIONS  (PRN):  dextrose 40% Gel 15 Gram(s) Oral once PRN Blood Glucose LESS THAN 70 milliGRAM(s)/deciliter  glucagon  Injectable 1 milliGRAM(s) IntraMuscular once PRN Glucose LESS THAN 70 milligrams/deciliter        HOME MEDICATIONS:  Home Medications:  aspirin 81 mg oral tablet: 1 tab(s) orally once a day (01 Aug 2019 14:37)  ALPRAZolam 0.25 milliGRAM(s) Oral three times a day PRN anxiety      VITALS:   T(F): 97.8 (08-01 @ 09:48), Max: 97.8 (08-01 @ 09:48)  HR: 68 (08-01 @ 09:48) (68 - 68)  BP: 184/74 (08-01 @ 09:48) (184/74 - 184/74)  RR: 18 (08-01 @ 09:48) (18 - 18)  SpO2: 99% (08-01 @ 09:48) (99% - 99%)        PHYSICAL EXAM:  GEN: Alert and oriented X 3, agitated  HEENT: no pallor  NECK: Supple, no JVD  LUNGS: Clear to auscultation bilaterally  CARDIOVASCULAR: S1/S2 regular, holosystolic murmur L sternal boarder   EXT: No Lower extremity edema/cyanosis. Abscess R. groin, healing  NEURO: Non focal  SKIN: Intact    LABS:                        11.8   10.16 )-----------( 357      ( 01 Aug 2019 11:11 )             34.7     08-01    132<L>  |  90<L>  |  24<H>  ----------------------------<  553<HH>  4.3   |  25  |  1.1    Ca    10.1      01 Aug 2019 11:11    TPro  8.0  /  Alb  3.8  /  TBili  0.3  /  DBili  x   /  AST  23  /  ALT  9   /  AlkPhos  120<H>  08-01    PT/INR - ( 01 Aug 2019 11:11 )   PT: 11.20 sec;   INR: 0.97 ratio         PTT - ( 01 Aug 2019 11:11 )  PTT:23.9 sec          Troponin trend: None        Hemoglobin A1C: None  Thyroid: None      RADIOLOGY:  -CXR: < from: Xray Pelvis AP only (08.01.19 @ 12:11) >  Findings/  impression:    Acute left femoral subtrochanteric fracture with 7 mm diastases.     < end of copied text >    -TTE: Pending   -CCTA: None  -STRESS TEST: None  -CATHETERIZATION: None    ECG: Pending    TELEMETRY EVENTS: HPI: i was called to risk straify her for the hip surgery, unfortunately she has not seen any physician for years, i did find any record of her in our office and presented with high glucose level.     67 y/o female w/ pmh of DM2 and non-ischemic cardiomyopathy 12 years ago, paroxysmal A-fib, anxiety, presents s/p mechanical.  As per patient she was walking and tripped on her feet.  She fell and did not hit her head, did not lose consciousness and did not having any incontinence.  Her son who is at bedside explains that for a long time he has noted that his mother, the patient has ambulatory issues. In the ED patient was complaining of Left hip pain and inability to ambulate. Also reports an abscess in her Right groin that she has been self-medicating with Amoxicillin 850mg X 8 day prior to fall.  Although the patient mentions she has no primary doctor she does explain that she has seen dr. Barbosa in the past.  She does not recall any visits to the doctor however within the last 10 years.  Self medicating with Furosemide occasionally when she feels bloated. Reports occasional gait issues with bilateral leg "stiffness and imbalance."  Denies any fever, chills, nausea, vomiting, headache, abd pain, chest pain.  No recent sick contacts. Admits to 15-20 lb weight loss in last "few" months with decreased appetite.      ROS:  All other systems reviewed and are negative    PAST MEDICAL & SURGICAL HISTORY  Non-ischemic cardiomyopathy: Patient reports having EF of 19 at the time. Says she has not followed up since but has returned to baseline  Paroxysmal A-fib not cardioverted- CHADSVASC 4  Bilateral cataract surgery      FAMILY HISTORY:  FAMILY HISTORY: Gastric-cancer- mother      SOCIAL HISTORY:  [+]smoker: 1.5 ppd X 30 years. Quit approx. 12 years ago  []Alcohol  []Drug    ALLERGIES:  No Known Allergies      MEDICATIONS:  MEDICATIONS  (STANDING):  ampicillin/sulbactam  IVPB 3 Gram(s) IV Intermittent every 6 hours  aspirin  chewable 81 milliGRAM(s) Oral daily  chlorhexidine 4% Liquid 1 Application(s) Topical <User Schedule>  dextrose 5%. 1000 milliLiter(s) (50 mL/Hr) IV Continuous <Continuous>  dextrose 50% Injectable 12.5 Gram(s) IV Push once  dextrose 50% Injectable 25 Gram(s) IV Push once  dextrose 50% Injectable 25 Gram(s) IV Push once  heparin  Injectable 5000 Unit(s) SubCutaneous every 12 hours  insulin glargine Injectable (LANTUS) 15 Unit(s) SubCutaneous at bedtime  insulin lispro Injectable (HumaLOG) 5 Unit(s) SubCutaneous before breakfast  insulin lispro Injectable (HumaLOG) 5 Unit(s) SubCutaneous before lunch  insulin lispro Injectable (HumaLOG) 5 Unit(s) SubCutaneous before dinner  insulin lispro Injectable (HumaLOG) 8 Unit(s) SubCutaneous once  lactated ringers 1000 milliLiter(s) (125 mL/Hr) IV Continuous <Continuous>  lactated ringers. 1000 milliLiter(s) (75 mL/Hr) IV Continuous <Continuous>    MEDICATIONS  (PRN):  dextrose 40% Gel 15 Gram(s) Oral once PRN Blood Glucose LESS THAN 70 milliGRAM(s)/deciliter  glucagon  Injectable 1 milliGRAM(s) IntraMuscular once PRN Glucose LESS THAN 70 milligrams/deciliter        HOME MEDICATIONS:  Home Medications:  aspirin 81 mg oral tablet: 1 tab(s) orally once a day (01 Aug 2019 14:37)  ALPRAZolam 0.25 milliGRAM(s) Oral three times a day PRN anxiety      VITALS:   T(F): 97.8 (08-01 @ 09:48), Max: 97.8 (08-01 @ 09:48)  HR: 68 (08-01 @ 09:48) (68 - 68)  BP: 184/74 (08-01 @ 09:48) (184/74 - 184/74)  RR: 18 (08-01 @ 09:48) (18 - 18)  SpO2: 99% (08-01 @ 09:48) (99% - 99%)        PHYSICAL EXAM:  GEN: Alert and oriented X 3, agitated  HEENT: no pallor  NECK: Supple, no JVD  LUNGS: Clear to auscultation bilaterally  CARDIOVASCULAR: S1/S2 regular, holosystolic murmur L sternal boarder   EXT: No Lower extremity edema/cyanosis. Abscess R. groin, healing  NEURO: Non focal  SKIN: Intact    LABS:                        11.8   10.16 )-----------( 357      ( 01 Aug 2019 11:11 )             34.7     08-01    132<L>  |  90<L>  |  24<H>  ----------------------------<  553<HH>  4.3   |  25  |  1.1    Ca    10.1      01 Aug 2019 11:11    TPro  8.0  /  Alb  3.8  /  TBili  0.3  /  DBili  x   /  AST  23  /  ALT  9   /  AlkPhos  120<H>  08-01    PT/INR - ( 01 Aug 2019 11:11 )   PT: 11.20 sec;   INR: 0.97 ratio         PTT - ( 01 Aug 2019 11:11 )  PTT:23.9 sec          Troponin trend: None        Hemoglobin A1C: None  Thyroid: None      RADIOLOGY:  -CXR: < from: Xray Pelvis AP only (08.01.19 @ 12:11) >  Findings/  impression:    Acute left femoral subtrochanteric fracture with 7 mm diastases.     < end of copied text >    -TTE: Pending   -CCTA: None  -STRESS TEST: None  -CATHETERIZATION: None    ECG: Pending    TELEMETRY EVENTS:

## 2019-08-01 NOTE — H&P ADULT - HISTORY OF PRESENT ILLNESS
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  As per patient she was walking and tripped on her feet.  She fell and did not hit her head, did not loose consciousness and did not having any incontinence.  Her brother who is at bedside explains that for a long time he has noted that his sister, the patient has ambulatory issues. In the ED patient was complaining of Left hip pain and inability to ambulate. Also reports an abscess in her Right groin that she has been self-medicating with Amoxicillin 850mg X 8 day prior to fall.  Although the patient mentions she has no primary doctor she does explain that she has seen eugenia chapa in the past.  She does not recall any visits to the doctor however within the last 10 years.   Self medicating with Furosemide occasionally when she feels bloated. Reports occasional gait issues with bilateral leg "stiffness and imbalance  Denies any fever, chills, nausea, vomiting, headache, abd pain, chest pain, lower extremity swelling.  No recent sick contacts or decrease in po intake.

## 2019-08-01 NOTE — CONSULT NOTE ADULT - ASSESSMENT
67 y/o F w/ history of non-ischemic cardiomyopathy w/ reduced EF, PAF, uncontrolled diabetes presents s/p fall with subtrochanteric L. femur fracture    #Patient is high risk for intermediate risk surgery being 4METS, and RCRI score of 2  -Hx non-ischedimc cardiomyopathy w/ low EF + CHF + PAF w/ non-compliance of care  - F/u echo + LV EF  - F/u ECG  - Insulin for diabetes control 69 y/o F w/ history of non-ischemic cardiomyopathy w/ reduced EF, PAF, uncontrolled diabetes presents s/p fall with subtrochanteric L. femur fracture    #Patient is high risk for intermediate risk surgery being 4METS, and RCRI score of 2  -Hx non-ischedimc cardiomyopathy w/ low EF + CHF + PAF w/ non-compliance of care  - F/u echo + LV EF  - F/u ECG  - Insulin for diabetes control         ***Attending Cardiologist to review, appreciate the consult*** 69 y/o F w/ history of non-ischemic cardiomyopathy w/ reduced EF, PAF, uncontrolled diabetes presents s/p fall with subtrochanteric L. femur fracture    Patient is moderate risk, for an intermediate risk surgery FC about 4METS, and RCRI index score 1, class II, 30 days risk of MACE 6%.  -Hx non-ischedimc cardiomyopathy w/ low EF + CHF + PAF w/ non-compliance of care  - HTN, uncontrolled  - echo + LV EF  - ECG  - Insulin for diabetes control   - if EF is acceptable iv hydration  then sometimes tomorrow afternoon when we have enough information and her glucose is better controlled then she can have the surgery done, by accepting above risk  if EF is low, be cautious about volume status and avoid volume overload, and use PRN Lasix 40 mg to keep the patient euvolemic after the surgery  separate from surgical risk assessment, she needs to be on Losartan 50 mg and then optimize, Metoprolol succinate 25 mg and optimize, Lipitor 20 mg and optimize for the management of DM, HTN, HLP        ***Attending Cardiologist to review, appreciate the consult***

## 2019-08-01 NOTE — ED PROVIDER NOTE - ATTENDING CONTRIBUTION TO CARE
Pt with fall and hip pain.  Denies other injury.  +susan ttp to left hip.  Neg other susan ttp.  +small draining abscess to right groin.  Neg fluctuance.  NL pulses.  a/p: labs, imaging, reassess

## 2019-08-02 LAB
ALBUMIN SERPL ELPH-MCNC: 3.1 G/DL — LOW (ref 3.5–5.2)
ALP SERPL-CCNC: 88 U/L — SIGNIFICANT CHANGE UP (ref 30–115)
ALT FLD-CCNC: 5 U/L — SIGNIFICANT CHANGE UP (ref 0–41)
ANION GAP SERPL CALC-SCNC: 13 MMOL/L — SIGNIFICANT CHANGE UP (ref 7–14)
ANION GAP SERPL CALC-SCNC: 15 MMOL/L — HIGH (ref 7–14)
AST SERPL-CCNC: 8 U/L — SIGNIFICANT CHANGE UP (ref 0–41)
BASOPHILS # BLD AUTO: 0.03 K/UL — SIGNIFICANT CHANGE UP (ref 0–0.2)
BASOPHILS NFR BLD AUTO: 0.4 % — SIGNIFICANT CHANGE UP (ref 0–1)
BILIRUB DIRECT SERPL-MCNC: <0.2 MG/DL — SIGNIFICANT CHANGE UP (ref 0–0.2)
BILIRUB INDIRECT FLD-MCNC: >0 MG/DL — LOW (ref 0.2–1.2)
BILIRUB SERPL-MCNC: 0.2 MG/DL — SIGNIFICANT CHANGE UP (ref 0.2–1.2)
BUN SERPL-MCNC: 14 MG/DL — SIGNIFICANT CHANGE UP (ref 10–20)
BUN SERPL-MCNC: 21 MG/DL — HIGH (ref 10–20)
CALCIUM SERPL-MCNC: 9.4 MG/DL — SIGNIFICANT CHANGE UP (ref 8.5–10.1)
CALCIUM SERPL-MCNC: 9.4 MG/DL — SIGNIFICANT CHANGE UP (ref 8.5–10.1)
CHLORIDE SERPL-SCNC: 101 MMOL/L — SIGNIFICANT CHANGE UP (ref 98–110)
CHLORIDE SERPL-SCNC: 104 MMOL/L — SIGNIFICANT CHANGE UP (ref 98–110)
CHOLEST SERPL-MCNC: 227 MG/DL — HIGH (ref 100–200)
CK SERPL-CCNC: 18 U/L — SIGNIFICANT CHANGE UP (ref 0–225)
CK SERPL-CCNC: 21 U/L — SIGNIFICANT CHANGE UP (ref 0–225)
CO2 SERPL-SCNC: 25 MMOL/L — SIGNIFICANT CHANGE UP (ref 17–32)
CO2 SERPL-SCNC: 28 MMOL/L — SIGNIFICANT CHANGE UP (ref 17–32)
CREAT SERPL-MCNC: 0.8 MG/DL — SIGNIFICANT CHANGE UP (ref 0.7–1.5)
CREAT SERPL-MCNC: 0.8 MG/DL — SIGNIFICANT CHANGE UP (ref 0.7–1.5)
EOSINOPHIL # BLD AUTO: 0.12 K/UL — SIGNIFICANT CHANGE UP (ref 0–0.7)
EOSINOPHIL NFR BLD AUTO: 1.7 % — SIGNIFICANT CHANGE UP (ref 0–8)
ESTIMATED AVERAGE GLUCOSE: 392 MG/DL — HIGH (ref 68–114)
ESTIMATED AVERAGE GLUCOSE: 398 MG/DL — HIGH (ref 68–114)
GLUCOSE BLDC GLUCOMTR-MCNC: 151 MG/DL — HIGH (ref 70–99)
GLUCOSE BLDC GLUCOMTR-MCNC: 243 MG/DL — HIGH (ref 70–99)
GLUCOSE BLDC GLUCOMTR-MCNC: 246 MG/DL — HIGH (ref 70–99)
GLUCOSE BLDC GLUCOMTR-MCNC: 302 MG/DL — HIGH (ref 70–99)
GLUCOSE BLDC GLUCOMTR-MCNC: 311 MG/DL — HIGH (ref 70–99)
GLUCOSE SERPL-MCNC: 216 MG/DL — HIGH (ref 70–99)
GLUCOSE SERPL-MCNC: 297 MG/DL — HIGH (ref 70–99)
HBA1C BLD-MCNC: 15.3 % — HIGH (ref 4–5.6)
HBA1C BLD-MCNC: 15.5 % — HIGH (ref 4–5.6)
HCT VFR BLD CALC: 28.9 % — LOW (ref 37–47)
HCV AB S/CO SERPL IA: 0.11 S/CO — SIGNIFICANT CHANGE UP (ref 0–0.99)
HCV AB SERPL-IMP: SIGNIFICANT CHANGE UP
HDLC SERPL-MCNC: 36 MG/DL — LOW
HGB BLD-MCNC: 9.7 G/DL — LOW (ref 12–16)
IMM GRANULOCYTES NFR BLD AUTO: 1.2 % — HIGH (ref 0.1–0.3)
LIPID PNL WITH DIRECT LDL SERPL: 145 MG/DL — HIGH (ref 4–129)
LYMPHOCYTES # BLD AUTO: 1.47 K/UL — SIGNIFICANT CHANGE UP (ref 1.2–3.4)
LYMPHOCYTES # BLD AUTO: 21.3 % — SIGNIFICANT CHANGE UP (ref 20.5–51.1)
MAGNESIUM SERPL-MCNC: 1.7 MG/DL — LOW (ref 1.8–2.4)
MCHC RBC-ENTMCNC: 27 PG — SIGNIFICANT CHANGE UP (ref 27–31)
MCHC RBC-ENTMCNC: 33.6 G/DL — SIGNIFICANT CHANGE UP (ref 32–37)
MCV RBC AUTO: 80.5 FL — LOW (ref 81–99)
MONOCYTES # BLD AUTO: 0.51 K/UL — SIGNIFICANT CHANGE UP (ref 0.1–0.6)
MONOCYTES NFR BLD AUTO: 7.4 % — SIGNIFICANT CHANGE UP (ref 1.7–9.3)
NEUTROPHILS # BLD AUTO: 4.7 K/UL — SIGNIFICANT CHANGE UP (ref 1.4–6.5)
NEUTROPHILS NFR BLD AUTO: 68 % — SIGNIFICANT CHANGE UP (ref 42.2–75.2)
NRBC # BLD: 0 /100 WBCS — SIGNIFICANT CHANGE UP (ref 0–0)
PHOSPHATE SERPL-MCNC: 3.3 MG/DL — SIGNIFICANT CHANGE UP (ref 2.1–4.9)
PLATELET # BLD AUTO: 298 K/UL — SIGNIFICANT CHANGE UP (ref 130–400)
POTASSIUM SERPL-MCNC: 3.3 MMOL/L — LOW (ref 3.5–5)
POTASSIUM SERPL-MCNC: 3.5 MMOL/L — SIGNIFICANT CHANGE UP (ref 3.5–5)
POTASSIUM SERPL-SCNC: 3.3 MMOL/L — LOW (ref 3.5–5)
POTASSIUM SERPL-SCNC: 3.5 MMOL/L — SIGNIFICANT CHANGE UP (ref 3.5–5)
PROT SERPL-MCNC: 6.2 G/DL — SIGNIFICANT CHANGE UP (ref 6–8)
RBC # BLD: 3.59 M/UL — LOW (ref 4.2–5.4)
RBC # FLD: 12.5 % — SIGNIFICANT CHANGE UP (ref 11.5–14.5)
SODIUM SERPL-SCNC: 141 MMOL/L — SIGNIFICANT CHANGE UP (ref 135–146)
SODIUM SERPL-SCNC: 145 MMOL/L — SIGNIFICANT CHANGE UP (ref 135–146)
TOTAL CHOLESTEROL/HDL RATIO MEASUREMENT: 6.3 RATIO — HIGH (ref 4–5.5)
TRIGL SERPL-MCNC: 400 MG/DL — HIGH (ref 10–149)
TSH SERPL-MCNC: 0.9 UIU/ML — SIGNIFICANT CHANGE UP (ref 0.27–4.2)
WBC # BLD: 6.91 K/UL — SIGNIFICANT CHANGE UP (ref 4.8–10.8)
WBC # FLD AUTO: 6.91 K/UL — SIGNIFICANT CHANGE UP (ref 4.8–10.8)

## 2019-08-02 PROCEDURE — 99233 SBSQ HOSP IP/OBS HIGH 50: CPT

## 2019-08-02 PROCEDURE — 93306 TTE W/DOPPLER COMPLETE: CPT | Mod: 26

## 2019-08-02 RX ORDER — ACETAMINOPHEN 500 MG
650 TABLET ORAL EVERY 6 HOURS
Refills: 0 | Status: DISCONTINUED | OUTPATIENT
Start: 2019-08-02 | End: 2019-08-03

## 2019-08-02 RX ORDER — MORPHINE SULFATE 50 MG/1
2 CAPSULE, EXTENDED RELEASE ORAL EVERY 6 HOURS
Refills: 0 | Status: DISCONTINUED | OUTPATIENT
Start: 2019-08-02 | End: 2019-08-03

## 2019-08-02 RX ORDER — TRAMADOL HYDROCHLORIDE 50 MG/1
50 TABLET ORAL EVERY 4 HOURS
Refills: 0 | Status: DISCONTINUED | OUTPATIENT
Start: 2019-08-02 | End: 2019-08-03

## 2019-08-02 RX ORDER — LOSARTAN POTASSIUM 100 MG/1
50 TABLET, FILM COATED ORAL DAILY
Refills: 0 | Status: DISCONTINUED | OUTPATIENT
Start: 2019-08-02 | End: 2019-08-03

## 2019-08-02 RX ORDER — MORPHINE SULFATE 50 MG/1
2 CAPSULE, EXTENDED RELEASE ORAL ONCE
Refills: 0 | Status: DISCONTINUED | OUTPATIENT
Start: 2019-08-02 | End: 2019-08-02

## 2019-08-02 RX ORDER — ATORVASTATIN CALCIUM 80 MG/1
20 TABLET, FILM COATED ORAL AT BEDTIME
Refills: 0 | Status: DISCONTINUED | OUTPATIENT
Start: 2019-08-02 | End: 2019-08-03

## 2019-08-02 RX ORDER — MAGNESIUM SULFATE 500 MG/ML
2 VIAL (ML) INJECTION ONCE
Refills: 0 | Status: COMPLETED | OUTPATIENT
Start: 2019-08-02 | End: 2019-08-02

## 2019-08-02 RX ORDER — VANCOMYCIN HCL 1 G
1000 VIAL (EA) INTRAVENOUS EVERY 12 HOURS
Refills: 0 | Status: DISCONTINUED | OUTPATIENT
Start: 2019-08-02 | End: 2019-08-03

## 2019-08-02 RX ORDER — POTASSIUM CHLORIDE 20 MEQ
20 PACKET (EA) ORAL
Refills: 0 | Status: COMPLETED | OUTPATIENT
Start: 2019-08-02 | End: 2019-08-03

## 2019-08-02 RX ADMIN — Medication 5 UNIT(S): at 10:28

## 2019-08-02 RX ADMIN — HEPARIN SODIUM 5000 UNIT(S): 5000 INJECTION INTRAVENOUS; SUBCUTANEOUS at 17:40

## 2019-08-02 RX ADMIN — Medication 25 GRAM(S): at 13:18

## 2019-08-02 RX ADMIN — Medication 5 MILLIGRAM(S): at 00:12

## 2019-08-02 RX ADMIN — Medication 0.25 MILLIGRAM(S): at 00:12

## 2019-08-02 RX ADMIN — Medication 110 MILLIGRAM(S): at 06:31

## 2019-08-02 RX ADMIN — MORPHINE SULFATE 2 MILLIGRAM(S): 50 CAPSULE, EXTENDED RELEASE ORAL at 17:44

## 2019-08-02 RX ADMIN — ATORVASTATIN CALCIUM 20 MILLIGRAM(S): 80 TABLET, FILM COATED ORAL at 21:56

## 2019-08-02 RX ADMIN — LOSARTAN POTASSIUM 50 MILLIGRAM(S): 100 TABLET, FILM COATED ORAL at 18:49

## 2019-08-02 RX ADMIN — AMPICILLIN SODIUM AND SULBACTAM SODIUM 200 GRAM(S): 250; 125 INJECTION, POWDER, FOR SUSPENSION INTRAMUSCULAR; INTRAVENOUS at 17:40

## 2019-08-02 RX ADMIN — HEPARIN SODIUM 5000 UNIT(S): 5000 INJECTION INTRAVENOUS; SUBCUTANEOUS at 06:31

## 2019-08-02 RX ADMIN — Medication 650 MILLIGRAM(S): at 18:07

## 2019-08-02 RX ADMIN — AMPICILLIN SODIUM AND SULBACTAM SODIUM 200 GRAM(S): 250; 125 INJECTION, POWDER, FOR SUSPENSION INTRAMUSCULAR; INTRAVENOUS at 12:09

## 2019-08-02 RX ADMIN — MORPHINE SULFATE 2 MILLIGRAM(S): 50 CAPSULE, EXTENDED RELEASE ORAL at 13:00

## 2019-08-02 RX ADMIN — Medication 650 MILLIGRAM(S): at 17:46

## 2019-08-02 RX ADMIN — INSULIN GLARGINE 15 UNIT(S): 100 INJECTION, SOLUTION SUBCUTANEOUS at 21:57

## 2019-08-02 RX ADMIN — Medication 5 MILLIGRAM(S): at 21:56

## 2019-08-02 RX ADMIN — MORPHINE SULFATE 2 MILLIGRAM(S): 50 CAPSULE, EXTENDED RELEASE ORAL at 12:08

## 2019-08-02 RX ADMIN — AMPICILLIN SODIUM AND SULBACTAM SODIUM 200 GRAM(S): 250; 125 INJECTION, POWDER, FOR SUSPENSION INTRAMUSCULAR; INTRAVENOUS at 06:32

## 2019-08-02 RX ADMIN — AMPICILLIN SODIUM AND SULBACTAM SODIUM 200 GRAM(S): 250; 125 INJECTION, POWDER, FOR SUSPENSION INTRAMUSCULAR; INTRAVENOUS at 00:13

## 2019-08-02 RX ADMIN — Medication 5 UNIT(S): at 17:40

## 2019-08-02 RX ADMIN — Medication 5 UNIT(S): at 13:17

## 2019-08-02 RX ADMIN — Medication 250 MILLIGRAM(S): at 17:40

## 2019-08-02 NOTE — PROGRESS NOTE ADULT - SUBJECTIVE AND OBJECTIVE BOX
Subjective:  she is much more stable, afebrile, FBG dropped to 260, electrolytes stable, renal function is normal, vital sign stable, BP dropped to normal range  on Antibiotics for the abscess, normal WBC, no fever  Patient had an echo today, EF was normal, mild LVH and mild diastolic dysfunction with normal valves noted.    MEDICATIONS  (STANDING):  ampicillin/sulbactam  IVPB 3 Gram(s) IV Intermittent every 6 hours  aspirin  chewable 81 milliGRAM(s) Oral daily  chlorhexidine 4% Liquid 1 Application(s) Topical <User Schedule>  dextrose 5%. 1000 milliLiter(s) (50 mL/Hr) IV Continuous <Continuous>  dextrose 50% Injectable 12.5 Gram(s) IV Push once  dextrose 50% Injectable 25 Gram(s) IV Push once  dextrose 50% Injectable 25 Gram(s) IV Push once  doxycycline IVPB 100 milliGRAM(s) IV Intermittent every 12 hours  doxycycline IVPB      heparin  Injectable 5000 Unit(s) SubCutaneous every 12 hours  insulin glargine Injectable (LANTUS) 15 Unit(s) SubCutaneous at bedtime  insulin lispro Injectable (HumaLOG) 5 Unit(s) SubCutaneous before breakfast  insulin lispro Injectable (HumaLOG) 5 Unit(s) SubCutaneous before lunch  insulin lispro Injectable (HumaLOG) 5 Unit(s) SubCutaneous before dinner  melatonin 5 milliGRAM(s) Oral at bedtime  sodium chloride 0.9%. 1000 milliLiter(s) (80 mL/Hr) IV Continuous <Continuous>    MEDICATIONS  (PRN):  acetaminophen   Tablet .. 650 milliGRAM(s) Oral every 6 hours PRN Mild Pain (1 - 3)  ALPRAZolam 0.25 milliGRAM(s) Oral three times a day PRN anxiety  dextrose 40% Gel 15 Gram(s) Oral once PRN Blood Glucose LESS THAN 70 milliGRAM(s)/deciliter  glucagon  Injectable 1 milliGRAM(s) IntraMuscular once PRN Glucose LESS THAN 70 milligrams/deciliter  morphine  - Injectable 2 milliGRAM(s) IV Push every 6 hours PRN Severe Pain (7 - 10)  traMADol 50 milliGRAM(s) Oral every 4 hours PRN Moderate Pain (4 - 6)            Vital Signs Last 24 Hrs  T(C): 36.1 (02 Aug 2019 08:25), Max: 36.5 (01 Aug 2019 23:28)  T(F): 97 (02 Aug 2019 08:25), Max: 97.7 (01 Aug 2019 23:28)  HR: 69 (02 Aug 2019 08:25) (69 - 78)  BP: 132/70 (02 Aug 2019 08:25) (128/62 - 141/64)  BP(mean): --  RR: 18 (02 Aug 2019 08:25) (18 - 18)  SpO2: 99% (01 Aug 2019 16:00) (99% - 99%)             REVIEW OF SYSTEMS:  CONSTITUTIONAL: no fever, no chills, no diaphoresis  CARDIOLOGY: no chest pain, no SOB, no palpitation, no diaphoresis, no faint   RESPIRATORY: no dyspnea, no wheeze, no orthopnea, no PND   NEUROLOGICAL: no dizziness, headache, focal deficits to report.  GI: no abdominal pain, no dyspepsia, no nausea, no vomiting, no diarrhea.    HEENT: no congestion, no nasal bleeding  SKIN: no ecchymosis, no petechia             PHYSICAL EXAM:  · CONSTITUTIONAL: No respiratory distress  . NECK: Supple, no JVD, no bruit   · RESPIRATORY: Normal air entry to lung base, no wheeze, no crackle, no wet rales  · CARDIOVASCULAR: S1, A2, P2, no murmur, no click, regular rate,  no rub,  · EXTREMITIES: No cyanosis, no clubbing, no edema, but typical sign of hip Fx, shrtened and deviated left leg  · VASCULAR: Pulses are regular, equal, bilateral in upper and lower extremities  	  ECG: < from: 12 Lead ECG (08.01.19 @ 19:35) >  Normal sinus rhythm  Left bundle branch block    < end of copied text >      TTE: < from: Transthoracic Echocardiogram (08.02.19 @ 09:11) >   1. Left ventricular ejection fraction, by visual estimation, is 55 to   60%.   2. LV Ejection Fraction by Renner's Method with a biplane EF of 58 %.   3. Mildly increased LV wall thickness.   4. Spectral Doppler shows impaired relaxation pattern of left     < end of copied text >      LABS:                        9.7    6.91  )-----------( 298      ( 02 Aug 2019 05:59 )             28.9     08-02    141  |  101  |  21<H>  ----------------------------<  297<H>  3.5   |  25  |  0.8    Ca    9.4      02 Aug 2019 05:59  Phos  3.3     08-02  Mg     1.7     08-02    TPro  6.2  /  Alb  3.1<L>  /  TBili  0.2  /  DBili  <0.2  /  AST  8   /  ALT  5   /  AlkPhos  88  08-02    CARDIAC MARKERS ( 02 Aug 2019 10:40 )  x     / x     / 18 U/L / x     / x          PT/INR - ( 01 Aug 2019 23:01 )   PT: 12.60 sec;   INR: 1.10 ratio         PTT - ( 01 Aug 2019 11:11 )  PTT:23.9 sec    I&O's Summary    01 Aug 2019 07:01  -  02 Aug 2019 07:00  --------------------------------------------------------  IN: 720 mL / OUT: 400 mL / NET: 320 mL      BNP  RADIOLOGY & ADDITIONAL STUDIES: < from: Xray Chest 1 View- PORTABLE-Urgent (08.01.19 @ 20:42) >  No radiographic evidence of acute cardiopulmonary disease.    < end of copied text >  < from: Xray Femur 2 Views, Left (08.01.19 @ 12:12) >  Acute left femoral subtrochanteric fracture with 7 mm diastases.    < end of copied text >      IMPRESSION AND PLAN:

## 2019-08-02 NOTE — PROGRESS NOTE ADULT - ASSESSMENT
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  The patient was also evaluated and treated for a right labial abscess likely related to uncontrolled diabetes.    Fall complicated by left hip fracture: NWB to the LLE.  Continue Morphine PRN for pain.  keep NPO after midnight for possible surgery tomorrow morning.  The patient is moderate risk for surgical complications.  Right Labial Abscess: status post incision and drainage.  Follow-up with General Sx-Dr. Mota for possible further debridement this afternoon.  The patient remains NPO. Infectious Disease recommendations were reviewed.  Continue treatment with IV Unasyn.  IV Vancomycin was added for MRSA coverage.  Check Vancomycin trough after 4 doses (8/4/19 AM)  Viral cardiomyopathy/moderate AS: repeat echocardiogram demonstrates normal ejection fraction with moderate AS.  There are no contraindications to surgery.  Please needs close follow-up with cardiology as outpatient  Uncontrolled DMII: continue Lantus/Lispro as directed.  Titrate insulin dosages if FS remain elevated.  Hypertension: will monitor.  Start Lisinopril 5mg PO q24 if BP remains elevated.  GI/DVT prophylaxis    #Progress Note Handoff    Pending:  Consults: Follow-up with Surgery and evaluate for further I&D    Future Disposition: TBD

## 2019-08-02 NOTE — PROGRESS NOTE ADULT - SUBJECTIVE AND OBJECTIVE BOX
HPI  Patient is a 68y old Female who presents with a chief complaint of mechanical fall (02 Aug 2019 14:27)    Currently admitted to medicine with the primary diagnosis of Subtrochanteric fracture of left femur     Today is hospital day 1d.     INTERVAL HPI / OVERNIGHT EVENTS:  Patient was examined and seen at bedside. This morning she is resting comfortably in bed and reports no new issues or overnight events.     ROS: Otherwise unremarkable     PAST MEDICAL & SURGICAL HISTORY  No pertinent past medical history    ALLERGIES  No Known Allergies    MEDICATIONS  STANDING MEDICATIONS  ampicillin/sulbactam  IVPB 3 Gram(s) IV Intermittent every 6 hours  aspirin  chewable 81 milliGRAM(s) Oral daily  chlorhexidine 4% Liquid 1 Application(s) Topical <User Schedule>  dextrose 5%. 1000 milliLiter(s) IV Continuous <Continuous>  dextrose 50% Injectable 12.5 Gram(s) IV Push once  dextrose 50% Injectable 25 Gram(s) IV Push once  dextrose 50% Injectable 25 Gram(s) IV Push once  heparin  Injectable 5000 Unit(s) SubCutaneous every 12 hours  insulin glargine Injectable (LANTUS) 15 Unit(s) SubCutaneous at bedtime  insulin lispro Injectable (HumaLOG) 5 Unit(s) SubCutaneous before breakfast  insulin lispro Injectable (HumaLOG) 5 Unit(s) SubCutaneous before lunch  insulin lispro Injectable (HumaLOG) 5 Unit(s) SubCutaneous before dinner  melatonin 5 milliGRAM(s) Oral at bedtime  sodium chloride 0.9%. 1000 milliLiter(s) IV Continuous <Continuous>  vancomycin  IVPB 1000 milliGRAM(s) IV Intermittent every 12 hours    PRN MEDICATIONS  acetaminophen   Tablet .. 650 milliGRAM(s) Oral every 6 hours PRN  ALPRAZolam 0.25 milliGRAM(s) Oral three times a day PRN  dextrose 40% Gel 15 Gram(s) Oral once PRN  glucagon  Injectable 1 milliGRAM(s) IntraMuscular once PRN  morphine  - Injectable 2 milliGRAM(s) IV Push every 6 hours PRN  traMADol 50 milliGRAM(s) Oral every 4 hours PRN    VITALS:  T(F): 97  HR: 69  BP: 132/70  RR: 18  SpO2: --    PHYSICAL EXAM  GEN: NAD, Resting comfortably in bed  PULM: Clear to auscultation bilaterally, No wheezes  CVS: Murmur  ABD: Soft, non-tender, non-distended, no guarding  EXT: No edema  NEURO: AAOx3, no focal deficits    LABS                        9.7    6.91  )-----------( 298      ( 02 Aug 2019 05:59 )             28.9     08-02    141  |  101  |  21<H>  ----------------------------<  297<H>  3.5   |  25  |  0.8    Ca    9.4      02 Aug 2019 05:59  Phos  3.3     08-02  Mg     1.7     08-02    TPro  6.2  /  Alb  3.1<L>  /  TBili  0.2  /  DBili  <0.2  /  AST  8   /  ALT  5   /  AlkPhos  88  08-02    PT/INR - ( 01 Aug 2019 23:01 )   PT: 12.60 sec;   INR: 1.10 ratio         PTT - ( 01 Aug 2019 11:11 )  PTT:23.9 sec      Creatine Kinase, Serum: 18 U/L (08-02-19 @ 10:40)      CARDIAC MARKERS ( 02 Aug 2019 10:40 )  x     / x     / 18 U/L / x     / x

## 2019-08-02 NOTE — PROGRESS NOTE ADULT - SUBJECTIVE AND OBJECTIVE BOX
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  The patient was also evaluated and treated for a right labial abscess likely related to uncontrolled diabetes.    Overnight events/Updates: No new complaints.  Her right groin wound continues to drain significant purulent material.    Vital Signs Last 24 Hrs  T(C): 36.8 (02 Aug 2019 15:00), Max: 36.8 (02 Aug 2019 15:00)  T(F): 98.2 (02 Aug 2019 15:00), Max: 98.2 (02 Aug 2019 15:00)  HR: 67 (02 Aug 2019 15:00) (67 - 78)  BP: 151/67 (02 Aug 2019 15:00) (128/62 - 151/67)  BP(mean): --  RR: 18 (02 Aug 2019 15:00) (18 - 18)  SpO2: --    Physical Examination:  General: AAO x 3  HEENT: PERRLA, EOMI  CV= S1 & S2 appreciated, + holosystolic murmur  Lungs= CTA BL  Abdominal Examination= + BS, Soft, NT/ND, right labial wound (draining)  Extremity Examination= No C/C/E    ROS: No chest pain, no shortness of breath.  All other systems reviewed and are within normal limits except for the complaints in the HPI.    MEDICATIONS  (STANDING):  ampicillin/sulbactam  IVPB 3 Gram(s) IV Intermittent every 6 hours  aspirin  chewable 81 milliGRAM(s) Oral daily  chlorhexidine 4% Liquid 1 Application(s) Topical <User Schedule>  dextrose 5%. 1000 milliLiter(s) (50 mL/Hr) IV Continuous <Continuous>  dextrose 50% Injectable 12.5 Gram(s) IV Push once  dextrose 50% Injectable 25 Gram(s) IV Push once  dextrose 50% Injectable 25 Gram(s) IV Push once  heparin  Injectable 5000 Unit(s) SubCutaneous every 12 hours  insulin glargine Injectable (LANTUS) 15 Unit(s) SubCutaneous at bedtime  insulin lispro Injectable (HumaLOG) 5 Unit(s) SubCutaneous before breakfast  insulin lispro Injectable (HumaLOG) 5 Unit(s) SubCutaneous before lunch  insulin lispro Injectable (HumaLOG) 5 Unit(s) SubCutaneous before dinner  melatonin 5 milliGRAM(s) Oral at bedtime  sodium chloride 0.9%. 1000 milliLiter(s) (80 mL/Hr) IV Continuous <Continuous>  vancomycin  IVPB 1000 milliGRAM(s) IV Intermittent every 12 hours    MEDICATIONS  (PRN):  acetaminophen   Tablet .. 650 milliGRAM(s) Oral every 6 hours PRN Mild Pain (1 - 3)  ALPRAZolam 0.25 milliGRAM(s) Oral three times a day PRN anxiety  dextrose 40% Gel 15 Gram(s) Oral once PRN Blood Glucose LESS THAN 70 milliGRAM(s)/deciliter  glucagon  Injectable 1 milliGRAM(s) IntraMuscular once PRN Glucose LESS THAN 70 milligrams/deciliter  morphine  - Injectable 2 milliGRAM(s) IV Push every 6 hours PRN Severe Pain (7 - 10)  traMADol 50 milliGRAM(s) Oral every 4 hours PRN Moderate Pain (4 - 6)                            9.7    6.91  )-----------( 298      ( 02 Aug 2019 05:59 )             28.9     08-02    141  |  101  |  21<H>  ----------------------------<  297<H>  3.5   |  25  |  0.8    Ca    9.4      02 Aug 2019 05:59  Phos  3.3     08-02  Mg     1.7     08-02    TPro  6.2  /  Alb  3.1<L>  /  TBili  0.2  /  DBili  <0.2  /  AST  8   /  ALT  5   /  AlkPhos  88  08-02      Case discussed with housestaff & family  MILTON Steiner [id number]

## 2019-08-02 NOTE — PROGRESS NOTE ADULT - SUBJECTIVE AND OBJECTIVE BOX
discussed with dr calderon   groin wound repacked  looks good  on IVABS  had echo  discussed plan with pt and family  gamma nail saturday

## 2019-08-02 NOTE — PROGRESS NOTE ADULT - ASSESSMENT
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     # S/p mechanical fall, with left hip fracture reported  - xr of hip shows Acute left femoral subtrochanteric fracture with 7 mm diastases.  - ortho consulted, most likely to go into OR tomorrow  - CXR, 2d echo ordered for preop, will follow up  - cardio consulted for stratification   - strict bed rest  - pain management  - NPO after midnight with IV Fluids for possible OR tomorrow for Left hip ORIF    # Right hip groin infection, cellulitis  - c/w unasyn q6h  - s/p I&D yesterday    # DM2, uncontrolled, not on home meds  - will start insulin/lispro/lantus  - blood glucose monitoring  - hba1c ordered    # ho of no medical examination in ten years  - routine bloodwork ordered  - tsh ordered  - will start statin for DLD    Activity: bedrest  diet: npo midnight, carb consistent for now  dvt ppx: heparin 5k bid  gi ppx: not indicated  full code  dispo: from home, will need pt/rehab oncedischarged

## 2019-08-02 NOTE — PROGRESS NOTE ADULT - ASSESSMENT
Anemia  DM, glucose better controlled, for now on insuline  Abscess, already on antibiotics, WBC count is acceptable, afebrile, responding well  No evidence of cardiomyopathy  fall, Trochanter Fx, needs surgery  HTN: BP much more stable, still needs to be on Losartan due to DM    Suggest Lipitor 20, Losartan 50, IV hydration, Insulin for now  Whenever infection is controlled enough to proceed with the hip surgery, then by accepting a moderate risk, RCRI index score of 1, class 2, 6% risk of MACE in the next 30 days, she can have the surgery done.  In the future after the recovery she needs a cardiovascular work up, which will be done as out patient in the next few months

## 2019-08-02 NOTE — PROGRESS NOTE ADULT - ASSESSMENT
68F w/poorly controlled DM s/p incision and drainage of right groin (labial) abscess     Plan:   -Change packing and dressing daily  -Possible OR today with Svetlana for further I&D   -Keep NPO   -HSQ  -Unasyn and doxy for Abx coverage

## 2019-08-02 NOTE — CONSULT NOTE ADULT - SUBJECTIVE AND OBJECTIVE BOX
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  As per patient she was walking and tripped on her feet.  She fell and did not hit her head, did not loose consciousness and did not having any incontinence.  Her brother who is at bedside explains that for a long time he has noted that his sister, the patient has ambulatory issues. In the ED patient was complaining of Left hip pain and inability to ambulate. Also reports an abscess in her Right groin that she has been self-medicating with Amoxicillin 850mg X 8 day prior to fall.  Although the patient mentions she has no primary doctor she does explain that she has seen eugenia chapa in the past.  She does not recall any visits to the doctor however within the last 10 years.   Self medicating with Furosemide occasionally when she feels bloated. Reports occasional gait issues with bilateral leg "stiffness and imbalance  Denies any fever, chills, nausea, vomiting, headache, abd pain, chest pain, lower extremity swelling.  No recent sick contacts or decrease in po intake. (01 Aug 2019 14:33)      Interval Events:  Patient seen and examined at bedside. She states that about 7 days ago, she noticed a small, erythematous area in her right groin. The area became swollen and painful (7/10) with some pus, so she was self-medicating with Amoxixillin 850mg, was putting gauze and neosporin, and it started to decrease. She states she has never had anything like this in past. She states that she does not have any pets, does not recall any bug bites, and denies being in wooded areas. After coming to ED for s/p mechanical fall and left femur fracture, surgery was consulted for I&D of right groin. In ED patient was afebrile, WBC 10.16, and was started on Unasyn and Doxycycline. Abscess was self-draining, and surgery consulted and performed incision and drainage. Pending BCx.     PAST MEDICAL & SURGICAL HISTORY:  No pertinent past medical history      Substance Use :  Tobacco Usage:  (   ) never smoked   ( x  ) former smoker   (   ) current smoker  (  10   ) pack year  (   12 yrs     ) last tobacco use date  Alcohol Usage: Denies  Travel: Denies  Pets: Denies          FAMILY HISTORY:      REVIEW OF SYSTEMS  General:	Denies any malaise fatigue or chills. Fevers absent    Skin: Right groin abscess  	  Ophthalmologic:Denies any visual complaints,discharge redness or photophobia  	  ENMT:No nasal discharge,headache,sinus congestion or throat pain.No dental complaints    Respiratory and Thorax:No cough,sputum or chest pain.Denies shortness of breath  	  Cardiovascular:	No chest pain,palpitaions or dizziness    Gastrointestinal:	NO nausea,abdominal pain or diarrhea.    Genitourinary:	No dysuria,frequency. No flank pain    Musculoskeletal:	 LLE painful, no pain in right groin    Neurological:No confusion,diziness.No extremity weakness.No bladder or bowel incontinence	    Psychiatric:No delusions or hallucinations	    Hematology/Lymphatics:	No LN swelling.No gum bleeding     Endocrine:	20 lbs weight loss in last 4 months. Reports decreased appetite. No abnormal heat/cold intolerance    Allergic/Immunologic:	No hives or rash     Allergies    No Known Allergies    Intolerances        Antimicrobials:    ampicillin/sulbactam  IVPB 3 Gram(s) IV Intermittent every 6 hours  doxycycline IVPB 100 milliGRAM(s) IV Intermittent every 12 hours  doxycycline IVPB          MEDICATIONS  (STANDING):  ampicillin/sulbactam  IVPB 3 Gram(s) IV Intermittent every 6 hours  aspirin  chewable 81 milliGRAM(s) Oral daily  chlorhexidine 4% Liquid 1 Application(s) Topical <User Schedule>  dextrose 5%. 1000 milliLiter(s) (50 mL/Hr) IV Continuous <Continuous>  dextrose 50% Injectable 12.5 Gram(s) IV Push once  dextrose 50% Injectable 25 Gram(s) IV Push once  dextrose 50% Injectable 25 Gram(s) IV Push once  doxycycline IVPB 100 milliGRAM(s) IV Intermittent every 12 hours  doxycycline IVPB      heparin  Injectable 5000 Unit(s) SubCutaneous every 12 hours  insulin glargine Injectable (LANTUS) 15 Unit(s) SubCutaneous at bedtime  insulin lispro Injectable (HumaLOG) 5 Unit(s) SubCutaneous before breakfast  insulin lispro Injectable (HumaLOG) 5 Unit(s) SubCutaneous before lunch  insulin lispro Injectable (HumaLOG) 5 Unit(s) SubCutaneous before dinner  magnesium sulfate  IVPB 2 Gram(s) IV Intermittent once  melatonin 5 milliGRAM(s) Oral at bedtime  sodium chloride 0.9%. 1000 milliLiter(s) (80 mL/Hr) IV Continuous <Continuous>    MEDICATIONS  (PRN):  ALPRAZolam 0.25 milliGRAM(s) Oral three times a day PRN anxiety  dextrose 40% Gel 15 Gram(s) Oral once PRN Blood Glucose LESS THAN 70 milliGRAM(s)/deciliter  glucagon  Injectable 1 milliGRAM(s) IntraMuscular once PRN Glucose LESS THAN 70 milligrams/deciliter        Vital Signs Last 24 Hrs  T(C): 36.1 (02 Aug 2019 08:25), Max: 36.5 (01 Aug 2019 23:28)  T(F): 97 (02 Aug 2019 08:25), Max: 97.7 (01 Aug 2019 23:28)  HR: 69 (02 Aug 2019 08:25) (69 - 78)  BP: 132/70 (02 Aug 2019 08:25) (128/62 - 141/64)  BP(mean): --  RR: 18 (02 Aug 2019 08:25) (18 - 18)  SpO2: 99% (01 Aug 2019 16:00) (99% - 99%)    PHYSICAL EXAM:Pleasant patient in no acute distress.      Constitutional:Comfortable.Awake and alert  No cachexia     Eyes:PERRL EOMI.NO discharge or conjunctival injection    ENMT:No sinus tenderness.No thrush.No pharyngeal exudate or erythema.Fair dental hygiene    Neck:Supple,No LN,no JVD      Respiratory:Good air entry bilaterally,CTA    Cardiovascular:S1 S2 wnl, No murmurs,rub or gallops    Gastrointestinal:Soft BS(+) no tenderness no masses ,No rebound or guarding    Genitourinary:No CVA tendereness     Rectal:    Extremities: Right Groin - small abscess s/p I&D, leaking bloody serous fluid. TTP left hip/leg    Vascular:peripheral pulses felt    Neurological:AAO X 3,No grossly focal deficits    Skin:No rash     Lymph Nodes:No palpable LNs    Musculoskeletal: Dec LOM left leg    Psychiatric:Affect normal.                                9.7    6.91  )-----------( 298      ( 02 Aug 2019 05:59 )             28.9     LIVER FUNCTIONS - ( 02 Aug 2019 05:59 )  Alb: 3.1 g/dL / Pro: 6.2 g/dL / ALK PHOS: 88 U/L / ALT: 5 U/L / AST: 8 U/L / GGT: x             08-02    141  |  101  |  21<H>  ----------------------------<  297<H>  3.5   |  25  |  0.8    Ca    9.4      02 Aug 2019 05:59  Phos  3.3     08-02  Mg     1.7     08-02    TPro  6.2  /  Alb  3.1<L>  /  TBili  0.2  /  DBili  <0.2  /  AST  8   /  ALT  5   /  AlkPhos  88  08-02              RECENT CULTURES:  Pending    MICROBIOLOGY:          Radiology:  < from: Xray Chest 1 View- PORTABLE-Urgent (08.01.19 @ 20:42) >  Impression:      No radiographic evidence of acute cardiopulmonary disease.        < end of copied text >    < from: Xray Femur 2 Views, Left (08.01.19 @ 12:12) >  Findings/  impression:    Acute left femoral subtrochanteric fracture with 7 mm diastases.      < end of copied text >      < from: Xray Pelvis AP only (08.01.19 @ 12:11) >  Findings/  impression:    Acute left femoral subtrochanteric fracture with 7 mm diastases.     < end of copied text >    < from: Xray Knee 3 Views, Left (08.01.19 @ 12:11) >  Impression:  No acute fracture or dislocation.      < end of copied text >

## 2019-08-02 NOTE — PROGRESS NOTE ADULT - SUBJECTIVE AND OBJECTIVE BOX
GENERAL SURGERY PROGRESS NOTE     GUS WILBURN  68y  Female  Hospital day :1d    OVERNIGHT EVENTS: I&D performed at bedside on abscess in the right groin, lateral to the labia. Wound packed and covered in gauze, possible I&D in the OR with Dr. Svetlana cruz    T(F): 97 (08-02-19 @ 08:25), Max: 97.7 (08-01-19 @ 23:28)  HR: 69 (08-02-19 @ 08:25) (69 - 78)  BP: 132/70 (08-02-19 @ 08:25) (128/62 - 141/64)  RR: 18 (08-02-19 @ 08:25) (18 - 18)  SpO2: 99% (08-01-19 @ 16:00) (99% - 99%)    DIET/FLUIDS: dextrose 5%. 1000 milliLiter(s) IV Continuous <Continuous>  magnesium sulfate  IVPB 2 Gram(s) IV Intermittent once  sodium chloride 0.9%. 1000 milliLiter(s) IV Continuous <Continuous>     GI proph:    AC/ proph: aspirin  chewable 81 milliGRAM(s) Oral daily  heparin  Injectable 5000 Unit(s) SubCutaneous every 12 hours    ABx: ampicillin/sulbactam  IVPB 3 Gram(s) IV Intermittent every 6 hours  doxycycline IVPB 100 milliGRAM(s) IV Intermittent every 12 hours  doxycycline IVPB          PHYSICAL EXAM:  GENERAL: NAD, well-appearing  CHEST/LUNG: Clear to auscultation bilaterally  HEART: Regular rate and rhythm  ABDOMEN: Soft, Nontender  EXTREMITIES:  No clubbing, cyanosis  : incised and drained abscess lateral to right labia, packed and covered with gauze partially saturated with blood       LABS    POCT Blood Glucose.: 302 mg/dL (02 Aug 2019 10:21)  POCT Blood Glucose.: 311 mg/dL (02 Aug 2019 08:02)  POCT Blood Glucose.: 225 mg/dL (01 Aug 2019 21:49)  POCT Blood Glucose.: 347 mg/dL (01 Aug 2019 16:51)  POCT Blood Glucose.: 441 mg/dL (01 Aug 2019 13:55)                          9.7    6.91  )-----------( 298      ( 02 Aug 2019 05:59 )             28.9       Auto Neutrophil %: 68.0 % (08-02-19 @ 05:59)  Auto Immature Granulocyte %: 1.2 % (08-02-19 @ 05:59)    08-02    141  |  101  |  21<H>  ----------------------------<  297<H>  3.5   |  25  |  0.8      Calcium, Total Serum: 9.4 mg/dL (08-02-19 @ 05:59)      LFTs:             6.2  | 0.2  | 8        ------------------[88      ( 02 Aug 2019 05:59 )  3.1  | <0.2 | 5           Blood Gas Venous - Lactate: 0.9 mmoL/L (08-01-19 @ 12:17)      Coags:     12.60  ----< 1.10    ( 01 Aug 2019 23:01 )     x           CARDIAC MARKERS ( 02 Aug 2019 10:40 )  x     / x     / 18 U/L / x     / x          RADIOLOGY & ADDITIONAL TESTS:  < from: Xray Chest 1 View- PORTABLE-Urgent (08.01.19 @ 20:42) >  Impression:      No radiographic evidence of acute cardiopulmonary disease.    < end of copied text >

## 2019-08-02 NOTE — CONSULT NOTE ADULT - ASSESSMENT
Patient is 69 yo female with hx of Viral myopathy, DM2, and non-compliance  presenting with     1.  Left hip fracture  -Continue with pain management, and further care as per ortho  -TTE, and cardiology consult for medical clearance  -await Blood culture and for infection to clear prior to surgery    2. Right ingrown abscess  -Pending blood cultures  - Patient is 67 yo female with hx of Viral myopathy, DM2, and non-compliance  presenting with     1.  Left hip fracture  -Continue with pain management, and further care as per ortho  -TTE, and cardiology consult for medical clearance  -await Blood culture and for infection to clear prior to surgery    2. Right ingrown abscess  -Pending blood cultures  - d/c Doxy  - Start Vancomycin 1g q12h and Unasyn 3g q6h ASSESSMENT  67 yo female with hx of Viral myopathy, DM2, and non-compliance  presenting with L hip fracture and R groin abscess (took amox at home)    IMPRESSION  #R groin abscess    s/p I&D    Sepsis ruled out on admission   #Left hip fracture  #Hyponatremia    RECOMMENDATIONS  - F/u wound cx  - d/c Doxy  - Start Vancomycin 1g q12h and Cont Unasyn 3g q6h  - Please check vanc trough 30 min prior to 4th dose

## 2019-08-02 NOTE — CONSULT NOTE ADULT - ATTENDING COMMENTS
above noted abdomen soft no distension LT hip fracture RT groin abscess will do I/D pt fully examined by me and agree with above
Pt seen and examined.  Agree with PA exam and plan.    NWB, bedrest, pain control, NPO after MN, plan for OR tomorrow.
I have personally examined the patient and reviewed the documentation above.  Corrections and edits were made wherever needed.

## 2019-08-03 LAB
ANION GAP SERPL CALC-SCNC: 13 MMOL/L — SIGNIFICANT CHANGE UP (ref 7–14)
APTT BLD: 30.6 SEC — SIGNIFICANT CHANGE UP (ref 27–39.2)
BUN SERPL-MCNC: 17 MG/DL — SIGNIFICANT CHANGE UP (ref 10–20)
CALCIUM SERPL-MCNC: 9.2 MG/DL — SIGNIFICANT CHANGE UP (ref 8.5–10.1)
CHLORIDE SERPL-SCNC: 103 MMOL/L — SIGNIFICANT CHANGE UP (ref 98–110)
CO2 SERPL-SCNC: 24 MMOL/L — SIGNIFICANT CHANGE UP (ref 17–32)
CREAT SERPL-MCNC: 0.7 MG/DL — SIGNIFICANT CHANGE UP (ref 0.7–1.5)
GLUCOSE BLDC GLUCOMTR-MCNC: 194 MG/DL — HIGH (ref 70–99)
GLUCOSE BLDC GLUCOMTR-MCNC: 207 MG/DL — HIGH (ref 70–99)
GLUCOSE BLDC GLUCOMTR-MCNC: 213 MG/DL — HIGH (ref 70–99)
GLUCOSE BLDC GLUCOMTR-MCNC: 234 MG/DL — HIGH (ref 70–99)
GLUCOSE BLDC GLUCOMTR-MCNC: 245 MG/DL — HIGH (ref 70–99)
GLUCOSE BLDC GLUCOMTR-MCNC: 265 MG/DL — HIGH (ref 70–99)
GLUCOSE SERPL-MCNC: 228 MG/DL — HIGH (ref 70–99)
HCT VFR BLD CALC: 29.8 % — LOW (ref 37–47)
HGB BLD-MCNC: 9.5 G/DL — LOW (ref 12–16)
INR BLD: 1.1 RATIO — SIGNIFICANT CHANGE UP (ref 0.65–1.3)
MCHC RBC-ENTMCNC: 26.4 PG — LOW (ref 27–31)
MCHC RBC-ENTMCNC: 31.9 G/DL — LOW (ref 32–37)
MCV RBC AUTO: 82.8 FL — SIGNIFICANT CHANGE UP (ref 81–99)
NRBC # BLD: 0 /100 WBCS — SIGNIFICANT CHANGE UP (ref 0–0)
PLATELET # BLD AUTO: 267 K/UL — SIGNIFICANT CHANGE UP (ref 130–400)
POTASSIUM SERPL-MCNC: 3.8 MMOL/L — SIGNIFICANT CHANGE UP (ref 3.5–5)
POTASSIUM SERPL-SCNC: 3.8 MMOL/L — SIGNIFICANT CHANGE UP (ref 3.5–5)
PROTHROM AB SERPL-ACNC: 12.6 SEC — SIGNIFICANT CHANGE UP (ref 9.95–12.87)
RBC # BLD: 3.6 M/UL — LOW (ref 4.2–5.4)
RBC # FLD: 12.9 % — SIGNIFICANT CHANGE UP (ref 11.5–14.5)
SODIUM SERPL-SCNC: 140 MMOL/L — SIGNIFICANT CHANGE UP (ref 135–146)
TSH SERPL-MCNC: 0.58 UIU/ML — SIGNIFICANT CHANGE UP (ref 0.27–4.2)
WBC # BLD: 6.71 K/UL — SIGNIFICANT CHANGE UP (ref 4.8–10.8)
WBC # FLD AUTO: 6.71 K/UL — SIGNIFICANT CHANGE UP (ref 4.8–10.8)

## 2019-08-03 PROCEDURE — 99233 SBSQ HOSP IP/OBS HIGH 50: CPT

## 2019-08-03 RX ORDER — GLUCAGON INJECTION, SOLUTION 0.5 MG/.1ML
1 INJECTION, SOLUTION SUBCUTANEOUS ONCE
Refills: 0 | Status: DISCONTINUED | OUTPATIENT
Start: 2019-08-03 | End: 2019-08-15

## 2019-08-03 RX ORDER — CALCIUM CARBONATE 500(1250)
1 TABLET ORAL ONCE
Refills: 0 | Status: COMPLETED | OUTPATIENT
Start: 2019-08-03 | End: 2019-08-03

## 2019-08-03 RX ORDER — VANCOMYCIN HCL 1 G
1000 VIAL (EA) INTRAVENOUS EVERY 12 HOURS
Refills: 0 | Status: DISCONTINUED | OUTPATIENT
Start: 2019-08-03 | End: 2019-08-04

## 2019-08-03 RX ORDER — DEXTROSE 50 % IN WATER 50 %
25 SYRINGE (ML) INTRAVENOUS ONCE
Refills: 0 | Status: DISCONTINUED | OUTPATIENT
Start: 2019-08-03 | End: 2019-08-15

## 2019-08-03 RX ORDER — ONDANSETRON 8 MG/1
4 TABLET, FILM COATED ORAL ONCE
Refills: 0 | Status: COMPLETED | OUTPATIENT
Start: 2019-08-03 | End: 2019-08-03

## 2019-08-03 RX ORDER — INSULIN LISPRO 100/ML
3 VIAL (ML) SUBCUTANEOUS
Refills: 0 | Status: DISCONTINUED | OUTPATIENT
Start: 2019-08-03 | End: 2019-08-03

## 2019-08-03 RX ORDER — INSULIN LISPRO 100/ML
VIAL (ML) SUBCUTANEOUS
Refills: 0 | Status: DISCONTINUED | OUTPATIENT
Start: 2019-08-03 | End: 2019-08-12

## 2019-08-03 RX ORDER — LOSARTAN POTASSIUM 100 MG/1
50 TABLET, FILM COATED ORAL DAILY
Refills: 0 | Status: DISCONTINUED | OUTPATIENT
Start: 2019-08-03 | End: 2019-08-15

## 2019-08-03 RX ORDER — MORPHINE SULFATE 50 MG/1
2 CAPSULE, EXTENDED RELEASE ORAL EVERY 6 HOURS
Refills: 0 | Status: DISCONTINUED | OUTPATIENT
Start: 2019-08-03 | End: 2019-08-07

## 2019-08-03 RX ORDER — HYDROMORPHONE HYDROCHLORIDE 2 MG/ML
1 INJECTION INTRAMUSCULAR; INTRAVENOUS; SUBCUTANEOUS
Refills: 0 | Status: DISCONTINUED | OUTPATIENT
Start: 2019-08-03 | End: 2019-08-03

## 2019-08-03 RX ORDER — INSULIN LISPRO 100/ML
3 VIAL (ML) SUBCUTANEOUS ONCE
Refills: 0 | Status: COMPLETED | OUTPATIENT
Start: 2019-08-03 | End: 2019-08-03

## 2019-08-03 RX ORDER — SODIUM CHLORIDE 9 MG/ML
1000 INJECTION, SOLUTION INTRAVENOUS
Refills: 0 | Status: DISCONTINUED | OUTPATIENT
Start: 2019-08-03 | End: 2019-08-03

## 2019-08-03 RX ORDER — AMPICILLIN SODIUM AND SULBACTAM SODIUM 250; 125 MG/ML; MG/ML
3 INJECTION, POWDER, FOR SUSPENSION INTRAMUSCULAR; INTRAVENOUS EVERY 6 HOURS
Refills: 0 | Status: DISCONTINUED | OUTPATIENT
Start: 2019-08-03 | End: 2019-08-10

## 2019-08-03 RX ORDER — MEPERIDINE HYDROCHLORIDE 50 MG/ML
12.5 INJECTION INTRAMUSCULAR; INTRAVENOUS; SUBCUTANEOUS
Refills: 0 | Status: DISCONTINUED | OUTPATIENT
Start: 2019-08-03 | End: 2019-08-03

## 2019-08-03 RX ORDER — HEPARIN SODIUM 5000 [USP'U]/ML
5000 INJECTION INTRAVENOUS; SUBCUTANEOUS EVERY 12 HOURS
Refills: 0 | Status: DISCONTINUED | OUTPATIENT
Start: 2019-08-03 | End: 2019-08-15

## 2019-08-03 RX ORDER — HYDROMORPHONE HYDROCHLORIDE 2 MG/ML
0.5 INJECTION INTRAMUSCULAR; INTRAVENOUS; SUBCUTANEOUS
Refills: 0 | Status: DISCONTINUED | OUTPATIENT
Start: 2019-08-03 | End: 2019-08-03

## 2019-08-03 RX ORDER — CHLORHEXIDINE GLUCONATE 213 G/1000ML
1 SOLUTION TOPICAL
Refills: 0 | Status: DISCONTINUED | OUTPATIENT
Start: 2019-08-03 | End: 2019-08-15

## 2019-08-03 RX ORDER — DEXTROSE 50 % IN WATER 50 %
12.5 SYRINGE (ML) INTRAVENOUS ONCE
Refills: 0 | Status: DISCONTINUED | OUTPATIENT
Start: 2019-08-03 | End: 2019-08-12

## 2019-08-03 RX ORDER — SODIUM CHLORIDE 9 MG/ML
1000 INJECTION, SOLUTION INTRAVENOUS
Refills: 0 | Status: DISCONTINUED | OUTPATIENT
Start: 2019-08-03 | End: 2019-08-15

## 2019-08-03 RX ORDER — TRAMADOL HYDROCHLORIDE 50 MG/1
50 TABLET ORAL EVERY 4 HOURS
Refills: 0 | Status: DISCONTINUED | OUTPATIENT
Start: 2019-08-03 | End: 2019-08-10

## 2019-08-03 RX ORDER — ALPRAZOLAM 0.25 MG
0.25 TABLET ORAL THREE TIMES A DAY
Refills: 0 | Status: DISCONTINUED | OUTPATIENT
Start: 2019-08-03 | End: 2019-08-03

## 2019-08-03 RX ORDER — ASPIRIN/CALCIUM CARB/MAGNESIUM 324 MG
81 TABLET ORAL DAILY
Refills: 0 | Status: DISCONTINUED | OUTPATIENT
Start: 2019-08-03 | End: 2019-08-15

## 2019-08-03 RX ORDER — INSULIN LISPRO 100/ML
1 VIAL (ML) SUBCUTANEOUS ONCE
Refills: 0 | Status: COMPLETED | OUTPATIENT
Start: 2019-08-03 | End: 2019-08-03

## 2019-08-03 RX ORDER — ACETAMINOPHEN 500 MG
650 TABLET ORAL EVERY 6 HOURS
Refills: 0 | Status: DISCONTINUED | OUTPATIENT
Start: 2019-08-03 | End: 2019-08-15

## 2019-08-03 RX ORDER — INSULIN LISPRO 100/ML
5 VIAL (ML) SUBCUTANEOUS
Refills: 0 | Status: DISCONTINUED | OUTPATIENT
Start: 2019-08-03 | End: 2019-08-06

## 2019-08-03 RX ORDER — INSULIN GLARGINE 100 [IU]/ML
15 INJECTION, SOLUTION SUBCUTANEOUS AT BEDTIME
Refills: 0 | Status: DISCONTINUED | OUTPATIENT
Start: 2019-08-03 | End: 2019-08-12

## 2019-08-03 RX ORDER — ZOLPIDEM TARTRATE 10 MG/1
5 TABLET ORAL AT BEDTIME
Refills: 0 | Status: DISCONTINUED | OUTPATIENT
Start: 2019-08-03 | End: 2019-08-09

## 2019-08-03 RX ORDER — ATORVASTATIN CALCIUM 80 MG/1
20 TABLET, FILM COATED ORAL AT BEDTIME
Refills: 0 | Status: DISCONTINUED | OUTPATIENT
Start: 2019-08-03 | End: 2019-08-15

## 2019-08-03 RX ORDER — DEXTROSE 50 % IN WATER 50 %
15 SYRINGE (ML) INTRAVENOUS ONCE
Refills: 0 | Status: DISCONTINUED | OUTPATIENT
Start: 2019-08-03 | End: 2019-08-12

## 2019-08-03 RX ADMIN — HEPARIN SODIUM 5000 UNIT(S): 5000 INJECTION INTRAVENOUS; SUBCUTANEOUS at 05:06

## 2019-08-03 RX ADMIN — Medication 81 MILLIGRAM(S): at 12:16

## 2019-08-03 RX ADMIN — INSULIN GLARGINE 15 UNIT(S): 100 INJECTION, SOLUTION SUBCUTANEOUS at 21:49

## 2019-08-03 RX ADMIN — Medication 3: at 18:09

## 2019-08-03 RX ADMIN — Medication 1 TABLET(S): at 04:52

## 2019-08-03 RX ADMIN — Medication 20 MILLIEQUIVALENT(S): at 00:00

## 2019-08-03 RX ADMIN — ZOLPIDEM TARTRATE 5 MILLIGRAM(S): 10 TABLET ORAL at 23:47

## 2019-08-03 RX ADMIN — Medication 20 MILLIEQUIVALENT(S): at 03:22

## 2019-08-03 RX ADMIN — SODIUM CHLORIDE 125 MILLILITER(S): 9 INJECTION, SOLUTION INTRAVENOUS at 09:33

## 2019-08-03 RX ADMIN — HYDROMORPHONE HYDROCHLORIDE 0.5 MILLIGRAM(S): 2 INJECTION INTRAMUSCULAR; INTRAVENOUS; SUBCUTANEOUS at 09:51

## 2019-08-03 RX ADMIN — Medication 250 MILLIGRAM(S): at 18:10

## 2019-08-03 RX ADMIN — LOSARTAN POTASSIUM 50 MILLIGRAM(S): 100 TABLET, FILM COATED ORAL at 06:35

## 2019-08-03 RX ADMIN — ONDANSETRON 4 MILLIGRAM(S): 8 TABLET, FILM COATED ORAL at 10:10

## 2019-08-03 RX ADMIN — AMPICILLIN SODIUM AND SULBACTAM SODIUM 200 GRAM(S): 250; 125 INJECTION, POWDER, FOR SUSPENSION INTRAMUSCULAR; INTRAVENOUS at 18:10

## 2019-08-03 RX ADMIN — Medication 250 MILLIGRAM(S): at 06:35

## 2019-08-03 RX ADMIN — HEPARIN SODIUM 5000 UNIT(S): 5000 INJECTION INTRAVENOUS; SUBCUTANEOUS at 18:09

## 2019-08-03 RX ADMIN — AMPICILLIN SODIUM AND SULBACTAM SODIUM 200 GRAM(S): 250; 125 INJECTION, POWDER, FOR SUSPENSION INTRAMUSCULAR; INTRAVENOUS at 12:15

## 2019-08-03 RX ADMIN — Medication 2: at 11:31

## 2019-08-03 RX ADMIN — AMPICILLIN SODIUM AND SULBACTAM SODIUM 200 GRAM(S): 250; 125 INJECTION, POWDER, FOR SUSPENSION INTRAMUSCULAR; INTRAVENOUS at 23:47

## 2019-08-03 RX ADMIN — AMPICILLIN SODIUM AND SULBACTAM SODIUM 200 GRAM(S): 250; 125 INJECTION, POWDER, FOR SUSPENSION INTRAMUSCULAR; INTRAVENOUS at 05:06

## 2019-08-03 RX ADMIN — Medication 3 UNIT(S): at 05:29

## 2019-08-03 RX ADMIN — ATORVASTATIN CALCIUM 20 MILLIGRAM(S): 80 TABLET, FILM COATED ORAL at 21:49

## 2019-08-03 RX ADMIN — HYDROMORPHONE HYDROCHLORIDE 0.5 MILLIGRAM(S): 2 INJECTION INTRAMUSCULAR; INTRAVENOUS; SUBCUTANEOUS at 10:53

## 2019-08-03 RX ADMIN — Medication 5 UNIT(S): at 18:07

## 2019-08-03 RX ADMIN — AMPICILLIN SODIUM AND SULBACTAM SODIUM 200 GRAM(S): 250; 125 INJECTION, POWDER, FOR SUSPENSION INTRAMUSCULAR; INTRAVENOUS at 00:00

## 2019-08-03 RX ADMIN — Medication 1 UNIT(S): at 04:52

## 2019-08-03 NOTE — PROGRESS NOTE ADULT - SUBJECTIVE AND OBJECTIVE BOX
GUS WILBURN  68y, Female  Allergy: No Known Allergies      CHIEF COMPLAINT: mechanical fall (03 Aug 2019 04:02)      INTERVAL EVENTS/HPI  - No acute events overnight  - T(F): , Max: 98.8 (08-03-19 @ 00:00)  - Denies any worsening symptoms  - Tolerating medication  - WBC Count: 6.71 K/uL (08-03-19 @ 06:01)      ROS  General: Denies rigors, nightsweats  HEENT: Denies headache, rhinorrhea, sore throat, eye pain  CV: Denies CP, palpitations  PULM: Denies SOB, wheezing  GI: Denies abdominal pain, hematochezia/melena  : Denies dysuria, hematuria  MSK: Denies arthralgias, myalgias  SKIN: Denies rash, lesions  NEURO: Denies paresthesias, weakness  PSYCH: Denies depression, anxiety    VITALS:  T(F): 98.8, Max: 98.8 (08-03-19 @ 00:00)  HR: 71  BP: 134/65  RR: 16Vital Signs Last 24 Hrs  T(C): 37.1 (03 Aug 2019 00:00), Max: 37.1 (03 Aug 2019 00:00)  T(F): 98.8 (03 Aug 2019 00:00), Max: 98.8 (03 Aug 2019 00:00)  HR: 71 (03 Aug 2019 00:00) (67 - 71)  BP: 134/65 (03 Aug 2019 00:00) (134/65 - 151/67)  BP(mean): --  RR: 16 (03 Aug 2019 00:00) (16 - 18)  SpO2: --    PHYSICAL EXAM:  Gen: NAD, resting in bed  HEENT: Normocephalic, atraumatic  Neck: supple, no lymphadenopathy  CV: Regular rate & regular rhythm  Lungs: decreased BS at bases, no fremitus  Abdomen: Soft, BS present  Ext: Warm, well perfused, R groin dressings  Neuro: non focal, awake  Skin: no rash, no erythema  Lines: no phlebitis    FH: Non-contributory  Social Hx: Non-contributory    TESTS & MEASUREMENTS:                        9.5    6.71  )-----------( 267      ( 03 Aug 2019 06:01 )             29.8     08-03    140  |  103  |  17  ----------------------------<  228<H>  3.8   |  24  |  0.7    Ca    9.2      03 Aug 2019 06:01  Phos  3.3     08-02  Mg     1.7     08-02    TPro  6.2  /  Alb  3.1<L>  /  TBili  0.2  /  DBili  <0.2  /  AST  8   /  ALT  5   /  AlkPhos  88  08-02    eGFR if Non African American: 90 mL/min/1.73M2 (08-03-19 @ 06:01)  eGFR if African American: 105 mL/min/1.73M2 (08-03-19 @ 06:01)  eGFR if : 89 mL/min/1.73M2 (08-02-19 @ 18:37)  eGFR if Non African American: 77 mL/min/1.73M2 (08-02-19 @ 18:37)    LIVER FUNCTIONS - ( 02 Aug 2019 05:59 )  Alb: 3.1 g/dL / Pro: 6.2 g/dL / ALK PHOS: 88 U/L / ALT: 5 U/L / AST: 8 U/L / GGT: x                 Blood Gas Venous - Lactate: 0.9 mmoL/L (08-01-19 @ 12:17)      INFECTIOUS DISEASES TESTING      RADIOLOGY & ADDITIONAL TESTS:  I have personally reviewed the last Chest xray  CXR  Xray Chest 1 View- PORTABLE-Urgent:   EXAM:  XR CHEST PORTABLE URGENT 1V            PROCEDURE DATE:  08/01/2019            INTERPRETATION:  Clinical History / Reason for exam: Preoperative    Comparison : Chest radiograph None.    Technique/Positioning: May 15, 2008.    Findings:    Support devices: None.    Cardiac/mediastinum/hilum: Unremarkable.    Lung parenchyma/Pleura: Within normal limits.    Skeleton/soft tissues: Unremarkable.    Impression:      No radiographic evidence of acute cardiopulmonary disease.                      ABEBE FORD M.D., ATTENDING RADIOLOGIST  This document has been electronically signed. Aug  2 2019  9:44AM             (08-01-19 @ 20:42)      CT      CARDIOLOGY TESTING  Transthoracic Echocardiogram:    EXAM:  2-D ECHO (TTE) COMPLETE        PROCEDURE DATE:  08/02/2019      INTERPRETATION:  REPORT:    TRANSTHORACIC ECHOCARDIOGRAM REPORT         Patient Name:   GUS WILBURN Accession #: [id number]  Medical Rec #:  [id number]    Height:      63.0 in 160.0 cm  YOB: 1950  Weight:      130.0 lb 58.97 kg  Patient Age:    68 years    BSA:         1.61 m²  Patient Gender: F           BP:          132/70 mmHg       Date of Exam:        8/2/2019 9:11:06 AM  Referring Physician: [id number] ERNESTO CATALAN  Sonographer:         ALEJANDRA  Fellow:              HANK Viera Physician:   Spring Arrington M.D.    Procedure:   2D Echo/Doppler/Color Doppler Complete.  Indications: R57.0 - Cardiogenic Shock  Diagnosis:   Cardiogenic shock - R57.0         Summary:   1. Left ventricular ejection fraction, by visual estimation, is 55 to   60%.   2. LV Ejection Fraction by Renner's Method with a biplane EF of 58 %.   3. Mildly increased LV wall thickness.   4. Spectral Doppler shows impaired relaxation pattern of left   ventricular myocardial filling (Grade I diastolic dysfunction).   5. Normal right atrial size.   6. Trivial pericardial effusion.   7. Mild mitral annular calcification.   8. Mild mitral valve regurgitation.   9. Mild thickening of the anterior and posterior mitral valve leaflets.  10. Mild aortic valve stenosis.  11. Peak transaortic gradient equals 27.0 mmHg, mean transaortic gradient   equals 10.9 mmHg, the calculated aortic valve area equals 1.34 cm² by the   continuity equation consistent with moderate aortic stenosis.    PHYSICIAN INTERPRETATION:  Left Ventricle: Left ventricular wall thickness is mildly increased. Left   ventricular ejection fraction, by visual estimation, is 55 to 60%.   Spectral Doppler shows impaired relaxation pattern of left ventricular   myocardial filling (Grade I diastolic dysfunction).  Right Ventricle: RV systolic function is normal.  Left Atrium: Normal left atrial size.  Right Atrium: Normal right atrial size.  Pericardium: Trivial pericardial effusion is present.  Mitral Valve: Mild thickening of the anterior and posterior mitral valve   leaflets. There is mild mitral annular calcification. Mild mitral valve   regurgitation is seen.  Tricuspid Valve: Structurally normal tricuspid valve, with normal leaflet   excursion.  Aortic Valve: The aortic valve is trileaflet. Mild aortic stenosis is   present. Peak transaortic gradient equals 27.0 mmHg, mean transaortic   gradient equals 10.9 mmHg, the calculated aortic valve area equals 1.34   cm² by the continuity equation consistent with moderate aortic stenosis.   No evidence of aortic valve regurgitation is seen.  Pulmonic Valve: The pulmonic valve was not well visualized. The pulmonic   valve is thickened with good excursion.No indication of pulmonic valve   regurgitation.  Pulmonary Artery: Normal pulmonary artery pressure.  Venous: The inferior vena cava is normal.       2D AND M-MODE MEASUREMENTS (normal ranges within parentheses):  Left Ventricle:                    Normal   Aorta/Left Atrium:              Normal  IVSd (2D):               1.24 cm  (0.7-1.1) LA Volume Index    23.2 ml/m²  LVPWd (2D):              1.14 cm  (0.7-1.1)  LVIDd (2D):              4.09 cm  (3.4-5.7)  LVIDs (2D):              2.86 cm  LV FS (2D):              30.0 %    (>25%)  IVSd (Mmode):            1.06 cm  (0.7-1.1)  LVPWd (Mmode):           0.88 cm  (0.7-1.1)  LVIDd (Mmode):           4.74 cm  (3.4-5.7)  LVIDs (Mmode):           3.30 cm  LV FS (Mmode):           30.3 %    (>25%)  Relative Wall Thickness   0.56     (<0.42)  Rel. Wall Thickness Mm    0.37     (<0.42)  LV Mass Index: Mmode    99.6 g/m²    SPECTRAL DOPPLER ANALYSIS:  LV DIASTOLIC FUNCTION:  MV Peak E: 0.91 m/s Decel Time: 167 msec  MV Peak A: 0.96 m/s  E/A Ratio:0.95    Aortic Valve:  AoV VMax:    2.60 m/s  AoV Area, Vmax: 1.11 cm² Vmax Indx: 0.69 cm²/m²  AoV VTI:     0.49 m    AoV Area, VTI:  1.34 cm² VTI Indx:  0.83 cm²/m²  AoV Pk Grad: 27.0 mmHg  AoV Mn Grad: 10.9 mmHg    LVOT Vmax: 0.97 m/s  LVOT VTI:  0.22 m  LVOT Diam: 1.94 cm    Mitral Valve:  MV P1/2 Time: 48.43 msec  MV Area, PHT: 4.54 cm²    Tricuspid Valve and PA/RV Systolic Pressure: TR Max Velocity: 2.22 m/s RA   Pressure: 3 mmHg RVSP/PASP: 22.8 mmHg       [id number] Spring Arrington M.D., Electronically signed on 8/2/2019 at 1:43:24   PM              *** Final ***                    SPRING ARRINGTON MD  This document has been electronically signed. Aug  2 2019  9:11AM             (08-02-19 @ 09:11)  12 Lead ECG:   Ventricular Rate 70 BPM    Atrial Rate 70 BPM    P-R Interval 170 ms    QRS Duration 128 ms    Q-T Interval 428 ms    QTC Calculation(Bezet) 462 ms    P Axis 43 degrees    R Axis -49 degrees    T Axis 46 degrees    Diagnosis Line Normal sinus rhythm  Left bundle branch block  Abnormal ECG    Confirmed by Joao Parnell ([id number]) on 8/1/2019 10:02:34 PM (08-01-19 @ 19:35)      MEDICATIONS  ampicillin/sulbactam  IVPB 3  aspirin  chewable 81  atorvastatin 20  chlorhexidine 4% Liquid 1  dextrose 5%. 1000  dextrose 50% Injectable 12.5  dextrose 50% Injectable 25  dextrose 50% Injectable 25  heparin  Injectable 5000  insulin glargine Injectable (LANTUS) 15  insulin lispro Injectable (HumaLOG) 5  insulin lispro Injectable (HumaLOG) 5  insulin lispro Injectable (HumaLOG) 5  insulin lispro Injectable (HumaLOG) 3  losartan 50  melatonin 5  sodium chloride 0.9%. 1000  vancomycin  IVPB 1000      ANTIBIOTICS:  ampicillin/sulbactam  IVPB 3 Gram(s) IV Intermittent every 6 hours  vancomycin  IVPB 1000 milliGRAM(s) IV Intermittent every 12 hours      All available historical records have been reviewed

## 2019-08-03 NOTE — PROGRESS NOTE ADULT - ASSESSMENT
ASSESSMENT  69 yo female with hx of Viral myopathy, DM2, and non-compliance  presenting with L hip fracture and R groin abscess (took amox at home)    IMPRESSION  #R groin abscess    s/p I&D    Sepsis ruled out on admission   #Left hip fracture  #Hyponatremia    RECOMMENDATIONS  - F/u wound cx  - Vancomycin 1g q12h and Cont Unasyn 3g q6h  - Please check vanc trough 30 min prior to 4th dose

## 2019-08-03 NOTE — PRE-ANESTHESIA EVALUATION ADULT - NSANTHPMHFT_GEN_ALL_CORE
Medicine- moderate risk    Cardiology- moderate risk Medicine- moderate risk  anemia    Cardiology- moderate risk

## 2019-08-03 NOTE — PROGRESS NOTE ADULT - SUBJECTIVE AND OBJECTIVE BOX
ORTHO POST-OP CHECK NOTE    Seen and examined at bedside. Pain controlled. Denies n/v.    Phys Ex  NAD, alert and oriented    LLE  Dressing c/d/i  SILT s/s/sp/dp/t  +EHL/FHL  toes wwp    A/P: 68F s/p L IMN for IT fx    pain control  WBAT  Rehab/PT  incentive spirometer  post-op abx  DVT ppx  dispo planning

## 2019-08-03 NOTE — PROGRESS NOTE ADULT - SUBJECTIVE AND OBJECTIVE BOX
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  The patient was also evaluated and treated for a right labial abscess likely related to uncontrolled diabetes.    Overnight events/Updates: The patient underwent ORIF of the left hip this afternoon.    Vital Signs Last 24 Hrs  T(C): 36.7 (03 Aug 2019 12:54), Max: 37.1 (03 Aug 2019 00:00)  T(F): 98 (03 Aug 2019 12:54), Max: 98.8 (03 Aug 2019 00:00)  HR: 75 (03 Aug 2019 12:54) (65 - 78)  BP: 139/70 (03 Aug 2019 12:54) (134/65 - 162/71)  BP(mean): 95 (03 Aug 2019 11:30) (93 - 121)  RR: 16 (03 Aug 2019 12:54) (10 - 18)  SpO2: 100% (03 Aug 2019 11:30) (93% - 100%)    Physical Examination:  General: AAO x 3  HEENT: PERRLA, EOMI  CV= S1 & S2 appreciated  Lungs= CTA BL  Abdominal Examination= + BS, Soft, NT/ND  Extremity Examination= dressing to the right groin    ROS: No chest pain, no shortness of breath.  All other systems reviewed and are within normal limits except for the complaints in the HPI.    MEDICATIONS  (STANDING):  ampicillin/sulbactam  IVPB 3 Gram(s) IV Intermittent every 6 hours  aspirin  chewable 81 milliGRAM(s) Oral daily  atorvastatin 20 milliGRAM(s) Oral at bedtime  chlorhexidine 4% Liquid 1 Application(s) Topical <User Schedule>  dextrose 5%. 1000 milliLiter(s) (50 mL/Hr) IV Continuous <Continuous>  dextrose 50% Injectable 12.5 Gram(s) IV Push once  dextrose 50% Injectable 25 Gram(s) IV Push once  dextrose 50% Injectable 25 Gram(s) IV Push once  heparin  Injectable 5000 Unit(s) SubCutaneous every 12 hours  insulin glargine Injectable (LANTUS) 15 Unit(s) SubCutaneous at bedtime  insulin lispro (HumaLOG) corrective regimen sliding scale   SubCutaneous three times a day before meals  insulin lispro Injectable (HumaLOG) 5 Unit(s) SubCutaneous three times a day before meals  losartan 50 milliGRAM(s) Oral daily  vancomycin  IVPB 1000 milliGRAM(s) IV Intermittent every 12 hours    MEDICATIONS  (PRN):  acetaminophen   Tablet .. 650 milliGRAM(s) Oral every 6 hours PRN Mild Pain (1 - 3)  ALPRAZolam 0.25 milliGRAM(s) Oral three times a day PRN anxiety  dextrose 40% Gel 15 Gram(s) Oral once PRN Blood Glucose LESS THAN 70 milliGRAM(s)/deciliter  glucagon  Injectable 1 milliGRAM(s) IntraMuscular once PRN Glucose LESS THAN 70 milligrams/deciliter  morphine  - Injectable 2 milliGRAM(s) IV Push every 6 hours PRN Severe Pain (7 - 10)  traMADol 50 milliGRAM(s) Oral every 4 hours PRN Moderate Pain (4 - 6)                            9.5    6.71  )-----------( 267      ( 03 Aug 2019 06:01 )             29.8     08-03    140  |  103  |  17  ----------------------------<  228<H>  3.8   |  24  |  0.7    Ca    9.2      03 Aug 2019 06:01  Phos  3.3     08-02  Mg     1.7     08-02    TPro  6.2  /  Alb  3.1<L>  /  TBili  0.2  /  DBili  <0.2  /  AST  8   /  ALT  5   /  AlkPhos  88  08-02      Case discussed with housestaff & family  MILTON Steiner [id number]

## 2019-08-03 NOTE — PROGRESS NOTE ADULT - ASSESSMENT
Assessment:  68y Female patient admitted S/P fall, found to have right groin abcess , with the above physical exam, labs, and imaging findings.    Plan:  -Change packing and dressing daily  -HSQ  -Unasyn and doxy for Abx coverage     Date/Time: 08-03-19 @ 04:04

## 2019-08-03 NOTE — PROGRESS NOTE ADULT - SUBJECTIVE AND OBJECTIVE BOX
Progress Note: Surgery  Patient: GUS WILBURN , 68y (1950)Female   MRN: [id number]  Location: [place] [address] [address]  Visit: 08-01-19 Inpatient  Date: 08-03-19 @ 04:04  Hospital Day: 6  Post-op Day:     Procedure/Diagnosis: right groin abcess  Events over 24h: No acute event overnight. No new complaint.     Vitals: T(F): 98.8 (08-03-19 @ 00:00), Max: 98.8 (08-03-19 @ 00:00)  HR: 71 (08-03-19 @ 00:00)  BP: 134/65 (08-03-19 @ 00:00) (132/70 - 151/67)  RR: 16 (08-03-19 @ 00:00)  SpO2: --      Diet: Diet, Consistent Carbohydrate/No Snacks (08-01-19 @ 14:50)  Diet, NPO after Midnight:      NPO Start Date: 02-Aug-2019,   NPO Start Time: 23:59 (08-02-19 @ 10:19)  Diet, NPO after Midnight:      NPO Start Date: 02-Aug-2019,   NPO Start Time: 23:59 (08-02-19 @ 20:26)    IV Fluid: dextrose 5%. 1000 milliLiter(s) (50 mL/Hr) IV Continuous <Continuous>  sodium chloride 0.9%. 1000 milliLiter(s) (80 mL/Hr) IV Continuous <Continuous>      In:   08-01-19 @ 07:01  -  08-02-19 @ 07:00  --------------------------------------------------------  IN: 720 mL    08-02-19 @ 07:01  -  08-03-19 @ 04:04  --------------------------------------------------------  IN: 120 mL      Out:   08-01-19 @ 07:01  -  08-02-19 @ 07:00  --------------------------------------------------------  OUT:    Voided: 400 mL  Total OUT: 400 mL      08-02-19 @ 07:01  -  08-03-19 @ 04:04  --------------------------------------------------------  OUT:    Voided: 700 mL  Total OUT: 700 mL        Net:   08-01-19 @ 07:01  -  08-02-19 @ 07:00  --------------------------------------------------------  NET: 320 mL    08-02-19 @ 07:01  -  08-03-19 @ 04:04  --------------------------------------------------------  NET: -580 mL        Physical Examination:    GENERAL: NAD, well-appearing  CHEST/LUNG: Clear to auscultation bilaterally  HEART: Regular rate and rhythm  ABDOMEN: Soft, Nontender  EXTREMITIES:  No clubbing, cyanosis  : incised and drained abscess lateral to right labia, packed and covered with gauze partially saturated with blood     Medications: [Standing]  ampicillin/sulbactam  IVPB 3 Gram(s) IV Intermittent every 6 hours  aspirin  chewable 81 milliGRAM(s) Oral daily  atorvastatin 20 milliGRAM(s) Oral at bedtime  chlorhexidine 4% Liquid 1 Application(s) Topical <User Schedule>  dextrose 5%. 1000 milliLiter(s) (50 mL/Hr) IV Continuous <Continuous>  dextrose 50% Injectable 12.5 Gram(s) IV Push once  dextrose 50% Injectable 25 Gram(s) IV Push once  dextrose 50% Injectable 25 Gram(s) IV Push once  heparin  Injectable 5000 Unit(s) SubCutaneous every 12 hours  insulin glargine Injectable (LANTUS) 15 Unit(s) SubCutaneous at bedtime  insulin lispro Injectable (HumaLOG) 5 Unit(s) SubCutaneous before breakfast  insulin lispro Injectable (HumaLOG) 5 Unit(s) SubCutaneous before lunch  insulin lispro Injectable (HumaLOG) 5 Unit(s) SubCutaneous before dinner  losartan 50 milliGRAM(s) Oral daily  melatonin 5 milliGRAM(s) Oral at bedtime  sodium chloride 0.9%. 1000 milliLiter(s) (80 mL/Hr) IV Continuous <Continuous>  vancomycin  IVPB 1000 milliGRAM(s) IV Intermittent every 12 hours    Medications:[PRN]  acetaminophen   Tablet .. 650 milliGRAM(s) Oral every 6 hours PRN  ALPRAZolam 0.25 milliGRAM(s) Oral three times a day PRN  dextrose 40% Gel 15 Gram(s) Oral once PRN  glucagon  Injectable 1 milliGRAM(s) IntraMuscular once PRN  morphine  - Injectable 2 milliGRAM(s) IV Push every 6 hours PRN  traMADol 50 milliGRAM(s) Oral every 4 hours PRN    Labs:                        9.7    6.91  )-----------( 298      ( 02 Aug 2019 05:59 )             28.9   08-02    145  |  104  |  14  ----------------------------<  216<H>  3.3<L>   |  28  |  0.8    Ca    9.4      02 Aug 2019 18:37  Phos  3.3     08-02  Mg     1.7     08-02    TPro  6.2  /  Alb  3.1<L>  /  TBili  0.2  /  DBili  <0.2  /  AST  8   /  ALT  5   /  AlkPhos  88  08-02  LIVER FUNCTIONS - ( 02 Aug 2019 05:59 )  Alb: 3.1 g/dL / Pro: 6.2 g/dL / ALK PHOS: 88 U/L / ALT: 5 U/L / AST: 8 U/L / GGT: x         PT/INR - ( 01 Aug 2019 23:01 )   PT: 12.60 sec;   INR: 1.10 ratio         PTT - ( 01 Aug 2019 11:11 )  PTT:23.9 secCARDIAC MARKERS ( 02 Aug 2019 18:37 )  x     / x     / 21 U/L / x     / x      CARDIAC MARKERS ( 02 Aug 2019 10:40 )  x     / x     / 18 U/L / x     / x          Micro/Urine:    Imaging:  None/24h

## 2019-08-03 NOTE — PROGRESS NOTE ADULT - ASSESSMENT
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  The patient was also evaluated and treated for a right labial abscess likely related to uncontrolled diabetes.    Fall complicated by left hip fracture: status post ORIF. Continue Morphine PRN for pain.  Repeat CBC in AM for acute blood loss anemia.  Follow-up with orthopedic surgery for weight bearing status.   Right Labial Abscess: status post incision and drainage.  Continue local wound care with packing. Infectious Disease follow-up appreciated; continue treatment with IV Unasyn and Vancomycin.  Check Vancomycin trough in AM.  Viral cardiomyopathy/moderate AS: repeat echocardiogram demonstrates normal ejection fraction with moderate AS.  Follow-up with cardiology as outpatient.  Uncontrolled DMII: continue Lantus/Lispro as directed.  Increase Lantus to 21u QHS and Lispro 7u with meals if FS remain >200.  Hypertension: will monitor.  BP has stabilized from admission, consider adding low dose ACEI if BP labile  GI/DVT prophylaxis    #Progress Note Handoff    Pending:  Consults: Physical Therapy evaluation for future disposition  Other: continue local wound care by General Sx    Future Disposition: TBD

## 2019-08-04 LAB
ANION GAP SERPL CALC-SCNC: 10 MMOL/L — SIGNIFICANT CHANGE UP (ref 7–14)
BLD GP AB SCN SERPL QL: SIGNIFICANT CHANGE UP
BUN SERPL-MCNC: 11 MG/DL — SIGNIFICANT CHANGE UP (ref 10–20)
CALCIUM SERPL-MCNC: 9.4 MG/DL — SIGNIFICANT CHANGE UP (ref 8.5–10.1)
CHLORIDE SERPL-SCNC: 108 MMOL/L — SIGNIFICANT CHANGE UP (ref 98–110)
CO2 SERPL-SCNC: 28 MMOL/L — SIGNIFICANT CHANGE UP (ref 17–32)
CREAT SERPL-MCNC: 0.7 MG/DL — SIGNIFICANT CHANGE UP (ref 0.7–1.5)
GLUCOSE BLDC GLUCOMTR-MCNC: 129 MG/DL — HIGH (ref 70–99)
GLUCOSE BLDC GLUCOMTR-MCNC: 152 MG/DL — HIGH (ref 70–99)
GLUCOSE BLDC GLUCOMTR-MCNC: 179 MG/DL — HIGH (ref 70–99)
GLUCOSE BLDC GLUCOMTR-MCNC: 207 MG/DL — HIGH (ref 70–99)
GLUCOSE SERPL-MCNC: 131 MG/DL — HIGH (ref 70–99)
HCT VFR BLD CALC: 27 % — LOW (ref 37–47)
HGB BLD-MCNC: 8.5 G/DL — LOW (ref 12–16)
MCHC RBC-ENTMCNC: 26.5 PG — LOW (ref 27–31)
MCHC RBC-ENTMCNC: 31.5 G/DL — LOW (ref 32–37)
MCV RBC AUTO: 84.1 FL — SIGNIFICANT CHANGE UP (ref 81–99)
NRBC # BLD: 0 /100 WBCS — SIGNIFICANT CHANGE UP (ref 0–0)
PLATELET # BLD AUTO: 289 K/UL — SIGNIFICANT CHANGE UP (ref 130–400)
POTASSIUM SERPL-MCNC: 4.4 MMOL/L — SIGNIFICANT CHANGE UP (ref 3.5–5)
POTASSIUM SERPL-SCNC: 4.4 MMOL/L — SIGNIFICANT CHANGE UP (ref 3.5–5)
RBC # BLD: 3.21 M/UL — LOW (ref 4.2–5.4)
RBC # FLD: 13.2 % — SIGNIFICANT CHANGE UP (ref 11.5–14.5)
SODIUM SERPL-SCNC: 146 MMOL/L — SIGNIFICANT CHANGE UP (ref 135–146)
VANCOMYCIN TROUGH SERPL-MCNC: 4.9 UG/ML — LOW (ref 5–10)
VANCOMYCIN TROUGH SERPL-MCNC: 6.3 UG/ML — SIGNIFICANT CHANGE UP (ref 5–10)
WBC # BLD: 7.01 K/UL — SIGNIFICANT CHANGE UP (ref 4.8–10.8)
WBC # FLD AUTO: 7.01 K/UL — SIGNIFICANT CHANGE UP (ref 4.8–10.8)

## 2019-08-04 PROCEDURE — 99233 SBSQ HOSP IP/OBS HIGH 50: CPT

## 2019-08-04 RX ORDER — VANCOMYCIN HCL 1 G
1250 VIAL (EA) INTRAVENOUS ONCE
Refills: 0 | Status: DISCONTINUED | OUTPATIENT
Start: 2019-08-04 | End: 2019-08-04

## 2019-08-04 RX ORDER — FLUCONAZOLE 150 MG/1
800 TABLET ORAL ONCE
Refills: 0 | Status: COMPLETED | OUTPATIENT
Start: 2019-08-04 | End: 2019-08-04

## 2019-08-04 RX ORDER — FLUCONAZOLE 150 MG/1
400 TABLET ORAL EVERY 24 HOURS
Refills: 0 | Status: DISCONTINUED | OUTPATIENT
Start: 2019-08-04 | End: 2019-08-10

## 2019-08-04 RX ORDER — VANCOMYCIN HCL 1 G
VIAL (EA) INTRAVENOUS
Refills: 0 | Status: DISCONTINUED | OUTPATIENT
Start: 2019-08-04 | End: 2019-08-04

## 2019-08-04 RX ORDER — VANCOMYCIN HCL 1 G
1250 VIAL (EA) INTRAVENOUS EVERY 12 HOURS
Refills: 0 | Status: DISCONTINUED | OUTPATIENT
Start: 2019-08-04 | End: 2019-08-04

## 2019-08-04 RX ADMIN — AMPICILLIN SODIUM AND SULBACTAM SODIUM 200 GRAM(S): 250; 125 INJECTION, POWDER, FOR SUSPENSION INTRAMUSCULAR; INTRAVENOUS at 23:46

## 2019-08-04 RX ADMIN — Medication 81 MILLIGRAM(S): at 12:34

## 2019-08-04 RX ADMIN — MORPHINE SULFATE 2 MILLIGRAM(S): 50 CAPSULE, EXTENDED RELEASE ORAL at 21:46

## 2019-08-04 RX ADMIN — MORPHINE SULFATE 2 MILLIGRAM(S): 50 CAPSULE, EXTENDED RELEASE ORAL at 09:01

## 2019-08-04 RX ADMIN — INSULIN GLARGINE 15 UNIT(S): 100 INJECTION, SOLUTION SUBCUTANEOUS at 21:45

## 2019-08-04 RX ADMIN — Medication 1: at 17:22

## 2019-08-04 RX ADMIN — HEPARIN SODIUM 5000 UNIT(S): 5000 INJECTION INTRAVENOUS; SUBCUTANEOUS at 05:42

## 2019-08-04 RX ADMIN — LOSARTAN POTASSIUM 50 MILLIGRAM(S): 100 TABLET, FILM COATED ORAL at 05:41

## 2019-08-04 RX ADMIN — Medication 1: at 12:30

## 2019-08-04 RX ADMIN — Medication 5 UNIT(S): at 09:00

## 2019-08-04 RX ADMIN — AMPICILLIN SODIUM AND SULBACTAM SODIUM 200 GRAM(S): 250; 125 INJECTION, POWDER, FOR SUSPENSION INTRAMUSCULAR; INTRAVENOUS at 17:27

## 2019-08-04 RX ADMIN — MORPHINE SULFATE 2 MILLIGRAM(S): 50 CAPSULE, EXTENDED RELEASE ORAL at 23:01

## 2019-08-04 RX ADMIN — AMPICILLIN SODIUM AND SULBACTAM SODIUM 200 GRAM(S): 250; 125 INJECTION, POWDER, FOR SUSPENSION INTRAMUSCULAR; INTRAVENOUS at 12:35

## 2019-08-04 RX ADMIN — Medication 5 UNIT(S): at 17:23

## 2019-08-04 RX ADMIN — Medication 5 UNIT(S): at 12:30

## 2019-08-04 RX ADMIN — MORPHINE SULFATE 2 MILLIGRAM(S): 50 CAPSULE, EXTENDED RELEASE ORAL at 16:01

## 2019-08-04 RX ADMIN — ATORVASTATIN CALCIUM 20 MILLIGRAM(S): 80 TABLET, FILM COATED ORAL at 21:45

## 2019-08-04 RX ADMIN — MORPHINE SULFATE 2 MILLIGRAM(S): 50 CAPSULE, EXTENDED RELEASE ORAL at 09:30

## 2019-08-04 RX ADMIN — HEPARIN SODIUM 5000 UNIT(S): 5000 INJECTION INTRAVENOUS; SUBCUTANEOUS at 17:27

## 2019-08-04 RX ADMIN — MORPHINE SULFATE 2 MILLIGRAM(S): 50 CAPSULE, EXTENDED RELEASE ORAL at 15:36

## 2019-08-04 RX ADMIN — AMPICILLIN SODIUM AND SULBACTAM SODIUM 200 GRAM(S): 250; 125 INJECTION, POWDER, FOR SUSPENSION INTRAMUSCULAR; INTRAVENOUS at 05:42

## 2019-08-04 RX ADMIN — FLUCONAZOLE 100 MILLIGRAM(S): 150 TABLET ORAL at 12:52

## 2019-08-04 NOTE — CONSULT NOTE ADULT - SUBJECTIVE AND OBJECTIVE BOX
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  She fell and did not hit her head, did not loose consciousness and did not having any incontinence.  Her brother who is at bedside explains that for a long time he has noted that his sister, the patient has ambulatory issues. In the ED patient was complaining of Left hip pain and inability to ambulate. Also reports an abscess in her Right groin that she has been self-medicating with Amoxicillin 850mg X 8 day prior to fall.  Although the patient mentions she has no primary doctor she does explain that she has seen eugenia chapa in the past.  She does not recall any visits to the doctor however within the last 10 years.   Self medicating with Furosemide occasionally when she feels bloated. Reports occasional gait issues with bilateral leg "stiffness and imbalance  Denies any fever, chills, nausea, vomiting, headache, abd pain, chest pain, lower extremity swelling.  No recent sick contacts or decrease in po intake. ptn  is  sp  lt hip  fx  sp orif  8/1/ 19  post  op  ptn  is  stable  .  PTN  REFERRED TO ACUTE  REHAB  FOR  EVAL AND  TX   PAST MEDICAL & SURGICAL HISTORY:  No pertinent past medical history      Hospital Course:    TODAY'S SUBJECTIVE & REVIEW OF SYMPTOMS:     Constitutional WNL   Cardio WNL   Resp WNL   GI WNL  Heme WNL  Endo WNL  Skin WNL  MSK WNL  Neuro WNL  Cognitive WNL  Psych WNL      MEDICATIONS  (STANDING):  ampicillin/sulbactam  IVPB 3 Gram(s) IV Intermittent every 6 hours  aspirin  chewable 81 milliGRAM(s) Oral daily  atorvastatin 20 milliGRAM(s) Oral at bedtime  chlorhexidine 4% Liquid 1 Application(s) Topical <User Schedule>  dextrose 5%. 1000 milliLiter(s) (50 mL/Hr) IV Continuous <Continuous>  dextrose 50% Injectable 12.5 Gram(s) IV Push once  dextrose 50% Injectable 25 Gram(s) IV Push once  dextrose 50% Injectable 25 Gram(s) IV Push once  fluconAZOLE IVPB 800 milliGRAM(s) IV Intermittent once  fluconAZOLE IVPB 400 milliGRAM(s) IV Intermittent every 24 hours  heparin  Injectable 5000 Unit(s) SubCutaneous every 12 hours  insulin glargine Injectable (LANTUS) 15 Unit(s) SubCutaneous at bedtime  insulin lispro (HumaLOG) corrective regimen sliding scale   SubCutaneous three times a day before meals  insulin lispro Injectable (HumaLOG) 5 Unit(s) SubCutaneous three times a day before meals  losartan 50 milliGRAM(s) Oral daily    MEDICATIONS  (PRN):  acetaminophen   Tablet .. 650 milliGRAM(s) Oral every 6 hours PRN Mild Pain (1 - 3)  ALPRAZolam 0.25 milliGRAM(s) Oral three times a day PRN anxiety  dextrose 40% Gel 15 Gram(s) Oral once PRN Blood Glucose LESS THAN 70 milliGRAM(s)/deciliter  glucagon  Injectable 1 milliGRAM(s) IntraMuscular once PRN Glucose LESS THAN 70 milligrams/deciliter  morphine  - Injectable 2 milliGRAM(s) IV Push every 6 hours PRN Severe Pain (7 - 10)  traMADol 50 milliGRAM(s) Oral every 4 hours PRN Moderate Pain (4 - 6)  zolpidem 5 milliGRAM(s) Oral at bedtime PRN Insomnia      FAMILY HISTORY:      Allergies    No Known Allergies    Intolerances        SOCIAL HISTORY:    [  ] Etoh  [  ] Smoking  [  ] Substance abuse     Home Environment:  [ xx ] Home Alone  [  ] Lives with Family  [  ] Home Health Aid    Dwelling:  [  ] Apartment  [x  ] Private House  [  ] Adult Home  [  ] Skilled Nursing Facility      [  ] Short Term  [  ] Long Term  [ x ] Stairs       Elevator [  ]    FUNCTIONAL STATUS PTA: (Check all that apply)  Ambulation: [ x  ]Independent    [  ] Dependent     [  ] Non-Ambulatory  Assistive Device: [  ] SA Cane  [  ]  Q Cane  [  ] Walker  [  ]  Wheelchair  ADL : [  x] Independent  [  ]  Dependent       Vital Signs Last 24 Hrs  T(C): 36.9 (04 Aug 2019 07:56), Max: 36.9 (04 Aug 2019 00:00)  T(F): 98.5 (04 Aug 2019 07:56), Max: 98.5 (04 Aug 2019 00:00)  HR: 79 (04 Aug 2019 07:56) (63 - 81)  BP: 159/67 (04 Aug 2019 07:56) (104/54 - 159/67)  BP(mean): --  RR: 16 (04 Aug 2019 07:56) (16 - 16)  SpO2: --      PHYSICAL EXAM: Alert & Oriented X3  GENERAL: NAD, well-groomed, well-developed  HEAD:  Atraumatic, Normocephalic  EYES: EOMI, PERRLA, conjunctiva and sclera clear  NECK: Supple, No JVD, Normal thyroid  CHEST/LUNG: Clear to percussion bilaterally; No rales, rhonchi, wheezing, or rubs  HEART: Regular rate and rhythm; No murmurs, rubs, or gallops  ABDOMEN: Soft, Nontender, Nondistended; Bowel sounds present  EXTREMITIES:  2+ Peripheral Pulses, No clubbing, cyanosis, or edema    NERVOUS SYSTEM:  Cranial Nerves 2-12 intact [  ] Abnormal  [  ]  ROM: WFL all extremities [  ]  Abnormal [  ]  Motor Strength: WFL all extremities  [  ]  Abnormal [  ]  Sensation: intact to light touch [  ] Abnormal [  ]  Reflexes: Symmetric [  ]  Abnormal [  ]    FUNCTIONAL STATUS:  Bed Mobility: Independent [  ]  Supervision [  ]  Needs Assistance [ x ]  N/A [  ]  Transfers: Independent [  ]  Supervision [  ]  Needs Assistance [ x ]  N/A [  ]   Ambulation: Independent [  ]  Supervision [  ]  Needs Assistance [ x ]  N/A [  ]  ADL: Independent [  ] Requires Assistance [  ] N/A [ x ]  SEE PT/OT IE NOTES    LABS:                        8.5    7.01  )-----------( 289      ( 04 Aug 2019 07:04 )             27.0     08-04    146  |  108  |  11  ----------------------------<  131<H>  4.4   |  28  |  0.7    Ca    9.4      04 Aug 2019 07:04      PT/INR - ( 03 Aug 2019 06:01 )   PT: 12.60 sec;   INR: 1.10 ratio         PTT - ( 03 Aug 2019 06:01 )  PTT:30.6 sec      RADIOLOGY & ADDITIONAL STUDIES:    Assesment:

## 2019-08-04 NOTE — PHYSICAL THERAPY INITIAL EVALUATION ADULT - GAIT DEVIATIONS NOTED, PT EVAL
decreased flory/decreased step length/Mild forward flexed posture, decreased speed, antalgic gait, decreased heel strike./decreased stride length

## 2019-08-04 NOTE — PHYSICAL THERAPY INITIAL EVALUATION ADULT - PLANNED THERAPY INTERVENTIONS, PT EVAL
bed mobility training/strengthening/balance training/transfer training/gait training/neuromuscular re-education/postural re-education

## 2019-08-04 NOTE — CONSULT NOTE ADULT - ASSESSMENT
IMPRESSION: Rehab of 69 y/o  f rehab  for  lt  hip fx  sp  orif      PRECAUTIONS: [  ] Cardiac  [  ] Respiratory  [  ] Seizures [  ] Contact Isolation  [  ] Droplet Isolation  [ FALL ] Other    Weight Bearing Status:     RECOMMENDATION:    Out of Bed to Chair     DVT/Decubiti Prophylaxis    REHAB PLAN:     [xx   ] Bedside P/T 3-5 times a week   [x   ]   Bedside O/T  2-3 times a week             [   ] No Rehab Therapy Indicated                   [   ]  Speech Therapy   Conditioning/ROM                                    ADL  Bed Mobility                                               Conditioning/ROM  Transfers                                                     Bed Mobility  Sitting /Standing Balance                         Transfers                                        Gait Training                                               Sitting/Standing Balance  Stair Training [   ]Applicable                    Home equipment Eval                                                                        Splinting  [   ] Only      GOALS:   ADL   [   x]   Independent                    Transfers  [  x ] Independent                          Ambulation  [x   ] Independent     [    ] With device                            [x  ]  CG                                                         [  x ]  CG                                                                  [ x  ] CG                            [    ] Min A                                                   [   ] Min A                                                              [   ] Min  A          DISCHARGE PLAN:   [ xx  ]  Good candidate for Intensive Rehabilitation/Hospital based-[facility]A [facility]                                             Will tolerate 3hrs Intensive Rehab Daily                                       [    ]  Short Term Rehab in Skilled Nursing Facility                                       [    ]  Home with Outpatient or VN services                                         [    ]  Possible Candidate for Intensive Hospital based Rehab

## 2019-08-04 NOTE — PHYSICAL THERAPY INITIAL EVALUATION ADULT - GENERAL OBSERVATIONS, REHAB EVAL
Pt rec in b/s chair with +chair alarm, in NAD with family at b/s. Pt agreeable to b/s PT, pt premedicated for pain.

## 2019-08-04 NOTE — PROGRESS NOTE ADULT - ASSESSMENT
ASSESSMENT  67 yo female with hx of Viral myopathy, DM2, and non-compliance  presenting with L hip fracture and R groin abscess (took amox at home)    IMPRESSION  #R groin abscess    s/p I&D, cx with yeast (did take po amox prior)    Sepsis ruled out on admission   #Left hip fracture, s/p OR  #Hyponatremia    RECOMMENDATIONS  - F/u wound cx  - DC Vanc as no MRSA in cultures  - ADD fluc 800mg IV x1 loading then tomorrow 400mg IV daily  - Cont Unasyn 3g q6h

## 2019-08-04 NOTE — PROGRESS NOTE ADULT - ASSESSMENT
Assessment:  68F with Right groin abscess s/p bedside I and D. Wound is packed and dressed.    Plan:  - continue daily packing and dressing change  - cont IV abx

## 2019-08-04 NOTE — PROGRESS NOTE ADULT - ASSESSMENT
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     # S/p mechanical fall, with left hip fracture reported  - xr of hip shows Acute left femoral subtrochanteric fracture with 7 mm diastases.  - POD 1 from L hip ORIF  - cardio consulted for stratification   - strict bed rest  - pain management      # Right hip groin infection, cellulitis  - c/w unasyn q6h  - s/p I&D   - Culture growing yeast  - vancomycin d/michael  - added IV Fluconazol 800mg today then 400mg q24      # DM2, uncontrolled, not on home meds  - will start insulin/lispro/lantus  - blood glucose monitoring  - hba1c 15.3    # ho of no medical examination in ten years  - routine bloodwork ordered  - tsh normal, CK normal  - will start statin for DLD    Activity: bedrest  diet: npo midnight, carb consistent for now  dvt ppx: heparin 5k bid  gi ppx: not indicated  full code  dispo: from home, will need pt/rehab oncedischarged

## 2019-08-04 NOTE — PROGRESS NOTE ADULT - SUBJECTIVE AND OBJECTIVE BOX
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  The patient was also evaluated and treated for a right labial abscess likely related to uncontrolled diabetes.    Overnight events/Updates: status post ORIF.  The patient complained of increased pain this AM, though she appeared comfortable following treatment with Morphine.    Vital Signs Last 24 Hrs  T(C): 36.9 (04 Aug 2019 07:56), Max: 36.9 (04 Aug 2019 00:00)  T(F): 98.5 (04 Aug 2019 07:56), Max: 98.5 (04 Aug 2019 00:00)  HR: 79 (04 Aug 2019 07:56) (63 - 81)  BP: 159/67 (04 Aug 2019 07:56) (104/54 - 159/67)  BP(mean): --  RR: 16 (04 Aug 2019 07:56) (16 - 16)  SpO2: --    Physical Examination:  General: AAO x 3  HEENT: PERRLA, EOMI  CV= S1 & S2 appreciated  Lungs=CTA BL  Abdominal Examination= + BS, Soft, NT/ND  Extremity Examination= dressing to the left hip    ROS: No chest pain, no shortness of breath.  All other systems reviewed and are within normal limits except for the complaints in the HPI.    MEDICATIONS  (STANDING):  ampicillin/sulbactam  IVPB 3 Gram(s) IV Intermittent every 6 hours  aspirin  chewable 81 milliGRAM(s) Oral daily  atorvastatin 20 milliGRAM(s) Oral at bedtime  chlorhexidine 4% Liquid 1 Application(s) Topical <User Schedule>  dextrose 5%. 1000 milliLiter(s) (50 mL/Hr) IV Continuous <Continuous>  dextrose 50% Injectable 12.5 Gram(s) IV Push once  dextrose 50% Injectable 25 Gram(s) IV Push once  dextrose 50% Injectable 25 Gram(s) IV Push once  fluconAZOLE IVPB 400 milliGRAM(s) IV Intermittent every 24 hours  heparin  Injectable 5000 Unit(s) SubCutaneous every 12 hours  insulin glargine Injectable (LANTUS) 15 Unit(s) SubCutaneous at bedtime  insulin lispro (HumaLOG) corrective regimen sliding scale   SubCutaneous three times a day before meals  insulin lispro Injectable (HumaLOG) 5 Unit(s) SubCutaneous three times a day before meals  losartan 50 milliGRAM(s) Oral daily    MEDICATIONS  (PRN):  acetaminophen   Tablet .. 650 milliGRAM(s) Oral every 6 hours PRN Mild Pain (1 - 3)  ALPRAZolam 0.25 milliGRAM(s) Oral three times a day PRN anxiety  dextrose 40% Gel 15 Gram(s) Oral once PRN Blood Glucose LESS THAN 70 milliGRAM(s)/deciliter  glucagon  Injectable 1 milliGRAM(s) IntraMuscular once PRN Glucose LESS THAN 70 milligrams/deciliter  morphine  - Injectable 2 milliGRAM(s) IV Push every 6 hours PRN Severe Pain (7 - 10)  traMADol 50 milliGRAM(s) Oral every 4 hours PRN Moderate Pain (4 - 6)  zolpidem 5 milliGRAM(s) Oral at bedtime PRN Insomnia                            8.5    7.01  )-----------( 289      ( 04 Aug 2019 07:04 )             27.0     08-04    146  |  108  |  11  ----------------------------<  131<H>  4.4   |  28  |  0.7    Ca    9.4      04 Aug 2019 07:04        Case discussed with housestaff & family  MILTON Steiner [id number]

## 2019-08-04 NOTE — PROGRESS NOTE ADULT - SUBJECTIVE AND OBJECTIVE BOX
GENERAL SURGERY PROGRESS NOTE     GUS WILBURN  68y  Female  Hospital day :3d  POD:  Procedure: Intramedullary nailing of femur    OVERNIGHT EVENTS: OR Ortho IM nail for NOF fracture. Packing and dressing was changed. Otherwise no acute events.    T(F): 98.5 (08-04-19 @ 00:00), Max: 98.5 (08-04-19 @ 00:00)  HR: 81 (08-04-19 @ 00:00) (63 - 81)  BP: 133/76 (08-04-19 @ 00:00) (104/54 - 162/71)  RR: 16 (08-04-19 @ 00:00) (10 - 17)  SpO2: 100% (08-03-19 @ 12:00) (93% - 100%)    DIET/FLUIDS: dextrose 5%. 1000 milliLiter(s) IV Continuous <Continuous>     GI proph:    AC/ proph: aspirin  chewable 81 milliGRAM(s) Oral daily  heparin  Injectable 5000 Unit(s) SubCutaneous every 12 hours    ABx: ampicillin/sulbactam  IVPB 3 Gram(s) IV Intermittent every 6 hours  vancomycin  IVPB 1000 milliGRAM(s) IV Intermittent every 12 hours      PHYSICAL EXAM:  GENERAL: NAD, well-appearing  CHEST/LUNG: Clear to auscultation bilaterally  HEART: Regular rate and rhythm  ABDOMEN: Soft, Nontender, Nondistended;   EXTREMITIES:  No clubbing, cyanosis, or edema      LABS  Labs:  CAPILLARY BLOOD GLUCOSE      POCT Blood Glucose.: 194 mg/dL (03 Aug 2019 20:57)  POCT Blood Glucose.: 265 mg/dL (03 Aug 2019 16:59)  POCT Blood Glucose.: 245 mg/dL (03 Aug 2019 11:12)  POCT Blood Glucose.: 207 mg/dL (03 Aug 2019 06:12)  POCT Blood Glucose.: 234 mg/dL (03 Aug 2019 04:57)                          9.5    6.71  )-----------( 267      ( 03 Aug 2019 06:01 )             29.8         08-03    140  |  103  |  17  ----------------------------<  228<H>  3.8   |  24  |  0.7      Calcium, Total Serum: 9.2 mg/dL (08-03-19 @ 06:01)      LFTs:             6.2  | 0.2  | 8        ------------------[88      ( 02 Aug 2019 05:59 )  3.1  | <0.2 | 5           Lipase:x      Amylase:x         Blood Gas Venous - Lactate: 0.9 mmoL/L (08-01-19 @ 12:17)      Coags:     12.60  ----< 1.10    ( 03 Aug 2019 06:01 )     30.6        CARDIAC MARKERS ( 02 Aug 2019 18:37 )  x     / x     / 21 U/L / x     / x      CARDIAC MARKERS ( 02 Aug 2019 10:40 )  x     / x     / 18 U/L / x     / x            Culture - Blood (collected 02 Aug 2019 05:59)  Source: .Blood None  Preliminary Report (03 Aug 2019 15:03):    No growth to date.    Culture - Blood (collected 02 Aug 2019 05:59)  Source: .Blood None  Preliminary Report (03 Aug 2019 15:03):    No growth to date.    Culture - Abscess with Gram Stain (collected 02 Aug 2019 00:05)  Source: .Abscess Hip - Right  Preliminary Report (03 Aug 2019 15:03):    Few Yeast    Culture - Abscess with Gram Stain (collected 01 Aug 2019 19:32)  Source: .Abscess groin  Preliminary Report (03 Aug 2019 15:06):    Moderate Yeast

## 2019-08-04 NOTE — PROGRESS NOTE ADULT - SUBJECTIVE AND OBJECTIVE BOX
HPI  Patient is a 68y old Female who presents with a chief complaint of mechanical fall (04 Aug 2019 08:51)    Currently admitted to medicine with the primary diagnosis of Subtrochanteric fracture of left femur     Today is hospital day 3d.     INTERVAL HPI / OVERNIGHT EVENTS:  Patient was examined and seen at bedside. This morning she is resting comfortably in bed and reports no new issues or overnight events.   PAtient culture grew yeast, vancomycin stopped, fluconazole added    ROS: Otherwise unremarkable     PAST MEDICAL & SURGICAL HISTORY  No pertinent past medical history    ALLERGIES  No Known Allergies    MEDICATIONS  STANDING MEDICATIONS  ampicillin/sulbactam  IVPB 3 Gram(s) IV Intermittent every 6 hours  aspirin  chewable 81 milliGRAM(s) Oral daily  atorvastatin 20 milliGRAM(s) Oral at bedtime  chlorhexidine 4% Liquid 1 Application(s) Topical <User Schedule>  dextrose 5%. 1000 milliLiter(s) IV Continuous <Continuous>  dextrose 50% Injectable 12.5 Gram(s) IV Push once  dextrose 50% Injectable 25 Gram(s) IV Push once  dextrose 50% Injectable 25 Gram(s) IV Push once  fluconAZOLE IVPB 800 milliGRAM(s) IV Intermittent once  fluconAZOLE IVPB 400 milliGRAM(s) IV Intermittent every 24 hours  heparin  Injectable 5000 Unit(s) SubCutaneous every 12 hours  insulin glargine Injectable (LANTUS) 15 Unit(s) SubCutaneous at bedtime  insulin lispro (HumaLOG) corrective regimen sliding scale   SubCutaneous three times a day before meals  insulin lispro Injectable (HumaLOG) 5 Unit(s) SubCutaneous three times a day before meals  losartan 50 milliGRAM(s) Oral daily    PRN MEDICATIONS  acetaminophen   Tablet .. 650 milliGRAM(s) Oral every 6 hours PRN  ALPRAZolam 0.25 milliGRAM(s) Oral three times a day PRN  dextrose 40% Gel 15 Gram(s) Oral once PRN  glucagon  Injectable 1 milliGRAM(s) IntraMuscular once PRN  morphine  - Injectable 2 milliGRAM(s) IV Push every 6 hours PRN  traMADol 50 milliGRAM(s) Oral every 4 hours PRN  zolpidem 5 milliGRAM(s) Oral at bedtime PRN    VITALS:  T(F): 98.5  HR: 79  BP: 159/67  RR: 16  SpO2: 100%    PHYSICAL EXAM  GEN: NAD, Resting comfortably in bed  PULM: Clear to auscultation bilaterally, No wheezes  CVS: Regular rate and rhythm, S1-S2, no murmurs  ABD: Soft, non-tender, non-distended, no guarding  EXT: No edema  NEURO: AAOx3, no focal deficits    LABS                        8.5    7.01  )-----------( 289      ( 04 Aug 2019 07:04 )             27.0     08-04    146  |  108  |  11  ----------------------------<  131<H>  4.4   |  28  |  0.7    Ca    9.4      04 Aug 2019 07:04      PT/INR - ( 03 Aug 2019 06:01 )   PT: 12.60 sec;   INR: 1.10 ratio         PTT - ( 03 Aug 2019 06:01 )  PTT:30.6 sec          Culture - Blood (collected 02 Aug 2019 05:59)  Source: .Blood None  Preliminary Report (03 Aug 2019 15:03):    No growth to date.    Culture - Blood (collected 02 Aug 2019 05:59)  Source: .Blood None  Preliminary Report (03 Aug 2019 15:03):    No growth to date.    Culture - Abscess with Gram Stain (collected 02 Aug 2019 00:05)  Source: .Abscess Hip - Right  Preliminary Report (03 Aug 2019 15:03):    Few Yeast    Culture - Abscess with Gram Stain (collected 01 Aug 2019 19:32)  Source: .Abscess groin  Preliminary Report (03 Aug 2019 15:06):    Moderate Yeast      CARDIAC MARKERS ( 02 Aug 2019 18:37 )  x     / x     / 21 U/L / x     / x          RADIOLOGY

## 2019-08-04 NOTE — PROGRESS NOTE ADULT - SUBJECTIVE AND OBJECTIVE BOX
GUS WILBURN  68y, Female  Allergy: No Known Allergies      CHIEF COMPLAINT: mechanical fall (04 Aug 2019 11:09)      INTERVAL EVENTS/HPI  - No acute events overnight, wcx with yeast   - T(F): , Max: 98.5 (08-04-19 @ 00:00)  - Denies any worsening symptoms  - Tolerating medication  - WBC Count: 7.01 K/uL (08-04-19 @ 07:04)      ROS  General: Denies rigors, nightsweats  HEENT: Denies headache, rhinorrhea, sore throat, eye pain  CV: Denies CP, palpitations  PULM: Denies SOB, wheezing  GI: Denies abdominal pain, hematochezia/melena  : Denies dysuria, hematuria  MSK: Denies arthralgias, myalgias  SKIN: Denies rash, lesions  NEURO: Denies paresthesias, weakness  PSYCH: Denies depression, anxiety    VITALS:  T(F): 98.5, Max: 98.5 (08-04-19 @ 00:00)  HR: 79  BP: 159/67  RR: 16Vital Signs Last 24 Hrs  T(C): 36.9 (04 Aug 2019 07:56), Max: 36.9 (04 Aug 2019 00:00)  T(F): 98.5 (04 Aug 2019 07:56), Max: 98.5 (04 Aug 2019 00:00)  HR: 79 (04 Aug 2019 07:56) (63 - 81)  BP: 159/67 (04 Aug 2019 07:56) (104/54 - 159/67)  BP(mean): --  RR: 16 (04 Aug 2019 07:56) (13 - 16)  SpO2: 100% (03 Aug 2019 12:00) (100% - 100%)    PHYSICAL EXAM:  Gen: NAD, resting in bed  HEENT: Normocephalic, atraumatic  Neck: supple, no lymphadenopathy  CV: Regular rate & regular rhythm  Lungs: decreased BS at bases, no fremitus  Abdomen: Soft, BS present  Ext: Warm, well perfused, R groin dressings  Neuro: non focal, awake  Skin: no rash, no erythema  Lines: no phlebitis      FH: Non-contributory  Social Hx: Non-contributory    TESTS & MEASUREMENTS:                        8.5    7.01  )-----------( 289      ( 04 Aug 2019 07:04 )             27.0     08-04    146  |  108  |  11  ----------------------------<  131<H>  4.4   |  28  |  0.7    Ca    9.4      04 Aug 2019 07:04      eGFR if Non African American: 90 mL/min/1.73M2 (08-04-19 @ 07:04)  eGFR if African American: 105 mL/min/1.73M2 (08-04-19 @ 07:04)          Culture - Blood (collected 08-02-19 @ 05:59)  Source: .Blood None  Preliminary Report (08-03-19 @ 15:03):    No growth to date.    Culture - Blood (collected 08-02-19 @ 05:59)  Source: .Blood None  Preliminary Report (08-03-19 @ 15:03):    No growth to date.    Culture - Abscess with Gram Stain (collected 08-02-19 @ 00:05)  Source: .Abscess Hip - Right  Preliminary Report (08-03-19 @ 15:03):    Few Yeast    Culture - Abscess with Gram Stain (collected 08-01-19 @ 19:32)  Source: .Abscess groin  Preliminary Report (08-03-19 @ 15:06):    Moderate Yeast        Blood Gas Venous - Lactate: 0.9 mmoL/L (08-01-19 @ 12:17)      INFECTIOUS DISEASES TESTING      RADIOLOGY & ADDITIONAL TESTS:  I have personally reviewed the last Chest xray  CXR  Xray Chest 1 View- PORTABLE-Urgent:   EXAM:  XR CHEST PORTABLE URGENT 1V            PROCEDURE DATE:  08/01/2019            INTERPRETATION:  Clinical History / Reason for exam: Preoperative    Comparison : Chest radiograph None.    Technique/Positioning: May 15, 2008.    Findings:    Support devices: None.    Cardiac/mediastinum/hilum: Unremarkable.    Lung parenchyma/Pleura: Within normal limits.    Skeleton/soft tissues: Unremarkable.    Impression:      No radiographic evidence of acute cardiopulmonary disease.                      ABEBE FORD M.D., ATTENDING RADIOLOGIST  This document has been electronically signed. Aug  2 2019  9:44AM             (08-01-19 @ 20:42)      CT      CARDIOLOGY TESTING  Transthoracic Echocardiogram:    EXAM:  2-D ECHO (TTE) COMPLETE        PROCEDURE DATE:  08/02/2019      INTERPRETATION:  REPORT:    TRANSTHORACIC ECHOCARDIOGRAM REPORT         Patient Name:   GUS WILBURN Accession #: [id number]  Medical Rec #:  [id number]    Height:      63.0 in 160.0 cm  YOB: 1950  Weight:      130.0 lb 58.97 kg  Patient Age:    68 years    BSA:         1.61 m²  Patient Gender: F           BP:          132/70 mmHg       Date of Exam:        8/2/2019 9:11:06 AM  Referring Physician: [id number] ERNESTO CATALAN  Sonographer:         ALEJANDRA  Fellow:              HANK Viera Physician:   Spring Arrington M.D.    Procedure:   2D Echo/Doppler/Color Doppler Complete.  Indications: R57.0 - Cardiogenic Shock  Diagnosis:   Cardiogenic shock - R57.0         Summary:   1. Left ventricular ejection fraction, by visual estimation, is 55 to   60%.   2. LV Ejection Fraction by Renner's Method with a biplane EF of 58 %.   3. Mildly increased LV wall thickness.   4. Spectral Doppler shows impaired relaxation pattern of left   ventricular myocardial filling (Grade I diastolic dysfunction).   5. Normal right atrial size.   6. Trivial pericardial effusion.   7. Mild mitral annular calcification.   8. Mild mitral valve regurgitation.   9. Mild thickening of the anterior and posterior mitral valve leaflets.  10. Mild aortic valve stenosis.  11. Peak transaortic gradient equals 27.0 mmHg, mean transaortic gradient   equals 10.9 mmHg, the calculated aortic valve area equals 1.34 cm² by the   continuity equation consistent with moderate aortic stenosis.    PHYSICIAN INTERPRETATION:  Left Ventricle: Left ventricular wall thickness is mildly increased. Left   ventricular ejection fraction, by visual estimation, is 55 to 60%.   Spectral Doppler shows impaired relaxation pattern of left ventricular   myocardial filling (Grade I diastolic dysfunction).  Right Ventricle: RV systolic function is normal.  Left Atrium: Normal left atrial size.  Right Atrium: Normal right atrial size.  Pericardium: Trivial pericardial effusion is present.  Mitral Valve: Mild thickening of the anterior and posterior mitral valve   leaflets. There is mild mitral annular calcification. Mild mitral valve   regurgitation is seen.  Tricuspid Valve: Structurally normal tricuspid valve, with normal leaflet   excursion.  Aortic Valve: The aortic valve is trileaflet. Mild aortic stenosis is   present. Peak transaortic gradient equals 27.0 mmHg, mean transaortic   gradient equals 10.9 mmHg, the calculated aortic valve area equals 1.34   cm² by the continuity equation consistent with moderate aortic stenosis.   No evidence of aortic valve regurgitation is seen.  Pulmonic Valve: The pulmonic valve was not well visualized. The pulmonic   valve is thickened with good excursion.No indication of pulmonic valve   regurgitation.  Pulmonary Artery: Normal pulmonary artery pressure.  Venous: The inferior vena cava is normal.       2D AND M-MODE MEASUREMENTS (normal ranges within parentheses):  Left Ventricle:                    Normal   Aorta/Left Atrium:              Normal  IVSd (2D):               1.24 cm  (0.7-1.1) LA Volume Index    23.2 ml/m²  LVPWd (2D):              1.14 cm  (0.7-1.1)  LVIDd (2D):              4.09 cm  (3.4-5.7)  LVIDs (2D):              2.86 cm  LV FS (2D):              30.0 %    (>25%)  IVSd (Mmode):            1.06 cm  (0.7-1.1)  LVPWd (Mmode):           0.88 cm  (0.7-1.1)  LVIDd (Mmode):           4.74 cm  (3.4-5.7)  LVIDs (Mmode):           3.30 cm  LV FS (Mmode):           30.3 %    (>25%)  Relative Wall Thickness   0.56     (<0.42)  Rel. Wall Thickness Mm    0.37     (<0.42)  LV Mass Index: Mmode    99.6 g/m²    SPECTRAL DOPPLER ANALYSIS:  LV DIASTOLIC FUNCTION:  MV Peak E: 0.91 m/s Decel Time: 167 msec  MV Peak A: 0.96 m/s  E/A Ratio:0.95    Aortic Valve:  AoV VMax:    2.60 m/s  AoV Area, Vmax: 1.11 cm² Vmax Indx: 0.69 cm²/m²  AoV VTI:     0.49 m    AoV Area, VTI:  1.34 cm² VTI Indx:  0.83 cm²/m²  AoV Pk Grad: 27.0 mmHg  AoV Mn Grad: 10.9 mmHg    LVOT Vmax: 0.97 m/s  LVOT VTI:  0.22 m  LVOT Diam: 1.94 cm    Mitral Valve:  MV P1/2 Time: 48.43 msec  MV Area, PHT: 4.54 cm²    Tricuspid Valve and PA/RV Systolic Pressure: TR Max Velocity: 2.22 m/s RA   Pressure: 3 mmHg RVSP/PASP: 22.8 mmHg       [id number] Spring Arrington M.D., Electronically signed on 8/2/2019 at 1:43:24   PM              *** Final ***                    SPRING ARRINGTON MD  This document has been electronically signed. Aug  2 2019  9:11AM             (08-02-19 @ 09:11)  12 Lead ECG:   Ventricular Rate 70 BPM    Atrial Rate 70 BPM    P-R Interval 170 ms    QRS Duration 128 ms    Q-T Interval 428 ms    QTC Calculation(Bezet) 462 ms    P Axis 43 degrees    R Axis -49 degrees    T Axis 46 degrees    Diagnosis Line Normal sinus rhythm  Left bundle branch block  Abnormal ECG    Confirmed by Joao Parnell ([id number]) on 8/1/2019 10:02:34 PM (08-01-19 @ 19:35)      MEDICATIONS  ampicillin/sulbactam  IVPB 3  aspirin  chewable 81  atorvastatin 20  chlorhexidine 4% Liquid 1  dextrose 5%. 1000  dextrose 50% Injectable 12.5  dextrose 50% Injectable 25  dextrose 50% Injectable 25  fluconAZOLE IVPB 800  fluconAZOLE IVPB 400  heparin  Injectable 5000  insulin glargine Injectable (LANTUS) 15  insulin lispro (HumaLOG) corrective regimen sliding scale   insulin lispro Injectable (HumaLOG) 5  losartan 50      ANTIBIOTICS:  ampicillin/sulbactam  IVPB 3 Gram(s) IV Intermittent every 6 hours  fluconAZOLE IVPB 800 milliGRAM(s) IV Intermittent once  fluconAZOLE IVPB 400 milliGRAM(s) IV Intermittent every 24 hours      All available historical records have been reviewed

## 2019-08-04 NOTE — PHYSICAL THERAPY INITIAL EVALUATION ADULT - PERTINENT HX OF CURRENT PROBLEM, REHAB EVAL
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  As per patient she was walking and tripped on her feet.

## 2019-08-04 NOTE — PROGRESS NOTE ADULT - ASSESSMENT
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  The patient was also evaluated and treated for a right labial abscess likely related to uncontrolled diabetes.    Fall complicated by left hip fracture: status post ORIF. Continue Morphine PRN for pain.  Post-operative acute blood loss anemia was noted; hemoglobin decreased to 8.5.  Orthopedics follow-up appreciated; WBAT to the E.  Follow-up with physical therapy to determine disposition.   Right Labial Abscess: status post incision and drainage.  Wound cultures were positive for Candida Albicans.  Infectious Disease recommendations were reviewed; discontinue IV Vancomycin and add Fluconazole.  Viral cardiomyopathy/moderate AS: repeat echocardiogram demonstrates normal ejection fraction with moderate AS.  Follow-up with cardiology as outpatient.  Uncontrolled DMII: continue Lantus/Lispro as directed.  Monitor FS with meals  Hypertension: will monitor.  BP has stabilized from admission, consider adding low dose ACEI if BP labile  GI/DVT prophylaxis    #Progress Note Handoff    Pending:  Consults: Physical Therapy evaluation for future disposition    Future Disposition: TBD

## 2019-08-05 LAB
ANION GAP SERPL CALC-SCNC: 13 MMOL/L — SIGNIFICANT CHANGE UP (ref 7–14)
BUN SERPL-MCNC: 10 MG/DL — SIGNIFICANT CHANGE UP (ref 10–20)
CALCIUM SERPL-MCNC: 9.4 MG/DL — SIGNIFICANT CHANGE UP (ref 8.5–10.1)
CHLORIDE SERPL-SCNC: 106 MMOL/L — SIGNIFICANT CHANGE UP (ref 98–110)
CO2 SERPL-SCNC: 24 MMOL/L — SIGNIFICANT CHANGE UP (ref 17–32)
CREAT SERPL-MCNC: 0.6 MG/DL — LOW (ref 0.7–1.5)
GLUCOSE BLDC GLUCOMTR-MCNC: 135 MG/DL — HIGH (ref 70–99)
GLUCOSE BLDC GLUCOMTR-MCNC: 186 MG/DL — HIGH (ref 70–99)
GLUCOSE BLDC GLUCOMTR-MCNC: 213 MG/DL — HIGH (ref 70–99)
GLUCOSE BLDC GLUCOMTR-MCNC: 251 MG/DL — HIGH (ref 70–99)
GLUCOSE SERPL-MCNC: 181 MG/DL — HIGH (ref 70–99)
HCT VFR BLD CALC: 26.9 % — LOW (ref 37–47)
HGB BLD-MCNC: 8.5 G/DL — LOW (ref 12–16)
MCHC RBC-ENTMCNC: 26.6 PG — LOW (ref 27–31)
MCHC RBC-ENTMCNC: 31.6 G/DL — LOW (ref 32–37)
MCV RBC AUTO: 84.3 FL — SIGNIFICANT CHANGE UP (ref 81–99)
NRBC # BLD: 0 /100 WBCS — SIGNIFICANT CHANGE UP (ref 0–0)
PLATELET # BLD AUTO: 275 K/UL — SIGNIFICANT CHANGE UP (ref 130–400)
POTASSIUM SERPL-MCNC: 4.2 MMOL/L — SIGNIFICANT CHANGE UP (ref 3.5–5)
POTASSIUM SERPL-SCNC: 4.2 MMOL/L — SIGNIFICANT CHANGE UP (ref 3.5–5)
RBC # BLD: 3.19 M/UL — LOW (ref 4.2–5.4)
RBC # FLD: 13 % — SIGNIFICANT CHANGE UP (ref 11.5–14.5)
SODIUM SERPL-SCNC: 143 MMOL/L — SIGNIFICANT CHANGE UP (ref 135–146)
WBC # BLD: 6.11 K/UL — SIGNIFICANT CHANGE UP (ref 4.8–10.8)
WBC # FLD AUTO: 6.11 K/UL — SIGNIFICANT CHANGE UP (ref 4.8–10.8)

## 2019-08-05 PROCEDURE — 99233 SBSQ HOSP IP/OBS HIGH 50: CPT

## 2019-08-05 RX ORDER — AMLODIPINE BESYLATE 2.5 MG/1
5 TABLET ORAL DAILY
Refills: 0 | Status: DISCONTINUED | OUTPATIENT
Start: 2019-08-05 | End: 2019-08-15

## 2019-08-05 RX ADMIN — Medication 5 UNIT(S): at 12:24

## 2019-08-05 RX ADMIN — LOSARTAN POTASSIUM 50 MILLIGRAM(S): 100 TABLET, FILM COATED ORAL at 05:49

## 2019-08-05 RX ADMIN — HEPARIN SODIUM 5000 UNIT(S): 5000 INJECTION INTRAVENOUS; SUBCUTANEOUS at 18:44

## 2019-08-05 RX ADMIN — INSULIN GLARGINE 15 UNIT(S): 100 INJECTION, SOLUTION SUBCUTANEOUS at 22:34

## 2019-08-05 RX ADMIN — Medication 2: at 12:24

## 2019-08-05 RX ADMIN — Medication 81 MILLIGRAM(S): at 12:26

## 2019-08-05 RX ADMIN — AMPICILLIN SODIUM AND SULBACTAM SODIUM 200 GRAM(S): 250; 125 INJECTION, POWDER, FOR SUSPENSION INTRAMUSCULAR; INTRAVENOUS at 12:27

## 2019-08-05 RX ADMIN — ATORVASTATIN CALCIUM 20 MILLIGRAM(S): 80 TABLET, FILM COATED ORAL at 22:34

## 2019-08-05 RX ADMIN — MORPHINE SULFATE 2 MILLIGRAM(S): 50 CAPSULE, EXTENDED RELEASE ORAL at 22:15

## 2019-08-05 RX ADMIN — Medication 5 UNIT(S): at 08:32

## 2019-08-05 RX ADMIN — AMPICILLIN SODIUM AND SULBACTAM SODIUM 200 GRAM(S): 250; 125 INJECTION, POWDER, FOR SUSPENSION INTRAMUSCULAR; INTRAVENOUS at 18:48

## 2019-08-05 RX ADMIN — Medication 1: at 08:31

## 2019-08-05 RX ADMIN — HEPARIN SODIUM 5000 UNIT(S): 5000 INJECTION INTRAVENOUS; SUBCUTANEOUS at 05:49

## 2019-08-05 RX ADMIN — MORPHINE SULFATE 2 MILLIGRAM(S): 50 CAPSULE, EXTENDED RELEASE ORAL at 11:43

## 2019-08-05 RX ADMIN — Medication 5 UNIT(S): at 18:43

## 2019-08-05 RX ADMIN — MORPHINE SULFATE 2 MILLIGRAM(S): 50 CAPSULE, EXTENDED RELEASE ORAL at 22:16

## 2019-08-05 RX ADMIN — AMPICILLIN SODIUM AND SULBACTAM SODIUM 200 GRAM(S): 250; 125 INJECTION, POWDER, FOR SUSPENSION INTRAMUSCULAR; INTRAVENOUS at 05:50

## 2019-08-05 RX ADMIN — AMLODIPINE BESYLATE 5 MILLIGRAM(S): 2.5 TABLET ORAL at 12:29

## 2019-08-05 NOTE — PROGRESS NOTE ADULT - SUBJECTIVE AND OBJECTIVE BOX
Seen and examined at bedside. Pain controlled. Able to ambulate w/ PT yesterday. No complaints.     Phys Ex  NAD, alert and oriented    LLE  Dressing c/d/i  SILT s/s/sp/dp/t  +EHL/FHL  toes wwp    A/P: 68F s/p L IMN for IT fx    pain control  WBAT  Rehab/PT  incentive spirometer  DVT ppx  dispo planning

## 2019-08-05 NOTE — PROGRESS NOTE ADULT - SUBJECTIVE AND OBJECTIVE BOX
GENERAL SURGERY PROGRESS NOTE     GUS WILBURN  68y  Female  Hospital day :4d  POD:  Procedure: Intramedullary nailing of femur    OVERNIGHT EVENTS: Patient went to the OR yesterday for intramedullary nailing of the femur. Patient tolerating pain well, appears to be up and out of bed multiple times through out the day. Dressing changed yesterday.     T(F): 97.7 (08-05-19 @ 00:00), Max: 99 (08-04-19 @ 15:00)  HR: 83 (08-05-19 @ 00:00) (79 - 87)  BP: 178/83 (08-05-19 @ 00:00) (144/72 - 178/83)  RR: 16 (08-05-19 @ 00:00) (16 - 16)      DIET/FLUIDS: dextrose 5%. 1000 milliLiter(s) IV Continuous <Continuous>    GI proph:    AC/ proph: aspirin  chewable 81 milliGRAM(s) Oral daily  heparin  Injectable 5000 Unit(s) SubCutaneous every 12 hours    ABx: ampicillin/sulbactam  IVPB 3 Gram(s) IV Intermittent every 6 hours  fluconAZOLE IVPB 400 milliGRAM(s) IV Intermittent every 24 hours      PHYSICAL EXAM:  GENERAL: NAD, well-appearing  CHEST/LUNG: Clear to auscultation bilaterally  HEART: Regular rate and rhythm  ABDOMEN: Soft, Nontender, Nondistended;   EXTREMITIES:  No clubbing, cyanosis, or edema      LABS    POCT Blood Glucose.: 207 mg/dL (04 Aug 2019 20:59)  POCT Blood Glucose.: 179 mg/dL (04 Aug 2019 16:44)  POCT Blood Glucose.: 152 mg/dL (04 Aug 2019 12:03)  POCT Blood Glucose.: 129 mg/dL (04 Aug 2019 08:02)                          8.5    7.01  )-----------( 289      ( 04 Aug 2019 07:04 )             27.0         08-04    146  |  108  |  11  ----------------------------<  131<H>  4.4   |  28  |  0.7      Calcium, Total Serum: 9.4 mg/dL (08-04-19 @ 07:04)      Coags:     12.60  ----< 1.10    ( 03 Aug 2019 06:01 )     30.6        Culture - Blood (collected 02 Aug 2019 05:59)  Source: .Blood None  Preliminary Report (03 Aug 2019 15:03):    No growth to date.    Culture - Blood (collected 02 Aug 2019 05:59)  Source: .Blood None  Preliminary Report (03 Aug 2019 15:03):    No growth to date.        RADIOLOGY & ADDITIONAL TESTS:    **No new images

## 2019-08-05 NOTE — OCCUPATIONAL THERAPY INITIAL EVALUATION ADULT - GENERAL OBSERVATIONS, REHAB EVAL
Pt encountered seated in b/s recliner , + IV locked, NAD. Family present at bedside. Pt agreeable to OT. Pt returned seated in b/s recliner, + iv locked

## 2019-08-05 NOTE — PROGRESS NOTE ADULT - ASSESSMENT
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     # S/p mechanical fall, with left hip fracture reported  - xr of hip shows Acute left femoral subtrochanteric fracture with 7 mm diastases.  - POD 2 from L hip ORIF  - cardio consulted for stratification   - strict bed rest  - pain management      # Right hip groin infection, cellulitis  - c/w unasyn q6h  - s/p I&D   - Culture growing yeast  - vancomycin d/michael  - added IV Fluconazol 800mg yesterday then 400mg q24      # DM2, uncontrolled, not on home meds  - will start insulin/lispro/lantus  - blood glucose monitoring  - hba1c 15.3    # ho of no medical examination in ten years  - routine bloodwork ordered  - tsh normal, CK normal  - will start statin for DLD    Activity: bedrest  diet: npo midnight, carb consistent for now  dvt ppx: heparin 5k bid  gi ppx: not indicated  full code  dispo: from home, will need pt/rehab oncedischarged 67 y/o female w/ pmh of DM2 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     # S/p mechanical fall, with left hip fracture reported  - xr of hip shows Acute left femoral subtrochanteric fracture with 7 mm diastases.  - POD 2 from L hip ORIF  - acute blood loss following sx, stable 8.5  - pain management  - full weight bearing, ambulate as tolerated  - did well with PT today      # Right hip groin infection, cellulitis  - c/w unasyn q6h  - s/p I&D   - Culture growing yeast  - vancomycin d/michael  - IV Fluconazol 400mg q24    # Viral cardiomyopathy/ AS  - mild AS on repeat echo, EF normal,  - f/u OP    # DM2, uncontrolled, not on home meds  - will start insulin/lispro/lantus  - blood glucose monitoring  - hba1c 15.3    # ho of no medical examination in ten years  - routine bloodwork ordered  - tsh normal, CK normal  - will start statin for DLD    #HTN  - patient has been hypertensive  - started amlodipine 5  - c/w losartan 50    Activity: AAT  diet: carb consistent  dvt ppx: heparin subq  gi ppx: not indicated  full code  dispo: from home, pending 4A

## 2019-08-05 NOTE — CHART NOTE - NSCHARTNOTEFT_GEN_A_CORE
Please change wound dressing two time per day (BID). Please pack the wound with half-inch iodoform dressing, then put a 4x4 gauze on top of the wound, then use a Tegaderm on top to secure the dressing.

## 2019-08-05 NOTE — OCCUPATIONAL THERAPY INITIAL EVALUATION ADULT - PLANNED THERAPY INTERVENTIONS, OT EVAL
joint mobilization/bed mobility training/massage/ROM/strengthening/stretching/transfer training/IADL retraining/ADL retraining/balance training

## 2019-08-05 NOTE — PROGRESS NOTE ADULT - ASSESSMENT
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  The patient was also evaluated and treated for a right labial abscess likely related to uncontrolled diabetes.    Fall complicated by left hip fracture: status post ORIF. Continue Morphine PRN for pain.  Post-operative acute blood loss anemia was noted; hemoglobin stabilized @ 8.5.  Orthopedics follow-up appreciated; WBAT to the LLE.  The patient did well with physical therapy; pending 4A placement.  Right Labial Abscess: status post incision and drainage.  Wound cultures were positive for Candida Albicans.  Continue wound care with half-inch iodoform packing, then place 4x4 gauze on top of the wound, then use a Tegaderm on top to secure the dressing.  Continue treatment with IV Unasyn and Fluconazole.  Viral cardiomyopathy/moderate AS: repeat echocardiogram demonstrates normal ejection fraction with moderate AS.  Follow-up with cardiology as outpatient.  Uncontrolled DMII: continue Lantus/Lispro as directed.  Monitor FS with meals  Hypertension: continue Losartan 50mg q24  GI/DVT prophylaxis    #Progress Note Handoff    Pending:    Future Disposition: Medically stable for discharge, pending placement in 4A

## 2019-08-05 NOTE — PROGRESS NOTE ADULT - SUBJECTIVE AND OBJECTIVE BOX
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  The patient was also evaluated and treated for a right labial abscess likely related to uncontrolled diabetes.  Status post ORIF    Overnight events/Updates: The patient ambulated >50' with assistance.  Her pain is well controlled.    Vital Signs Last 24 Hrs  T(C): 37.3 (05 Aug 2019 15:53), Max: 37.4 (05 Aug 2019 07:27)  T(F): 99.1 (05 Aug 2019 15:53), Max: 99.3 (05 Aug 2019 07:27)  HR: 80 (05 Aug 2019 15:53) (80 - 85)  BP: 160/68 (05 Aug 2019 15:53) (160/68 - 178/83)  BP(mean): --  RR: 17 (05 Aug 2019 15:53) (16 - 17)  SpO2: --    Physical Examination:  General: AAO x 3  HEENT: PERRLA, EOMI  CV= S1 & S2 appreciated  Lungs= CTA BL  Abdominal Examination= + BS, Soft, NT/ND  Extremity Examination= No C/C/E    ROS: No chest pain, no shortness of breath.  All other systems reviewed and are within normal limits except for the complaints in the HPI.    MEDICATIONS  (STANDING):  amLODIPine   Tablet 5 milliGRAM(s) Oral daily  ampicillin/sulbactam  IVPB 3 Gram(s) IV Intermittent every 6 hours  aspirin  chewable 81 milliGRAM(s) Oral daily  atorvastatin 20 milliGRAM(s) Oral at bedtime  chlorhexidine 4% Liquid 1 Application(s) Topical <User Schedule>  dextrose 5%. 1000 milliLiter(s) (50 mL/Hr) IV Continuous <Continuous>  dextrose 50% Injectable 12.5 Gram(s) IV Push once  dextrose 50% Injectable 25 Gram(s) IV Push once  dextrose 50% Injectable 25 Gram(s) IV Push once  fluconAZOLE IVPB 400 milliGRAM(s) IV Intermittent every 24 hours  heparin  Injectable 5000 Unit(s) SubCutaneous every 12 hours  insulin glargine Injectable (LANTUS) 15 Unit(s) SubCutaneous at bedtime  insulin lispro (HumaLOG) corrective regimen sliding scale   SubCutaneous three times a day before meals  insulin lispro Injectable (HumaLOG) 5 Unit(s) SubCutaneous three times a day before meals  losartan 50 milliGRAM(s) Oral daily    MEDICATIONS  (PRN):  acetaminophen   Tablet .. 650 milliGRAM(s) Oral every 6 hours PRN Mild Pain (1 - 3)  ALPRAZolam 0.25 milliGRAM(s) Oral three times a day PRN anxiety  dextrose 40% Gel 15 Gram(s) Oral once PRN Blood Glucose LESS THAN 70 milliGRAM(s)/deciliter  glucagon  Injectable 1 milliGRAM(s) IntraMuscular once PRN Glucose LESS THAN 70 milligrams/deciliter  morphine  - Injectable 2 milliGRAM(s) IV Push every 6 hours PRN Severe Pain (7 - 10)  traMADol 50 milliGRAM(s) Oral every 4 hours PRN Moderate Pain (4 - 6)  zolpidem 5 milliGRAM(s) Oral at bedtime PRN Insomnia                            8.5    6.11  )-----------( 275      ( 05 Aug 2019 06:08 )             26.9     08-05    143  |  106  |  10  ----------------------------<  181<H>  4.2   |  24  |  0.6<L>    Ca    9.4      05 Aug 2019 06:08        Case discussed with housestaff & family  MILTON Steiner [id number]

## 2019-08-05 NOTE — PROGRESS NOTE ADULT - SUBJECTIVE AND OBJECTIVE BOX
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  As per patient she was walking and tripped on her feet.  She fell and did not hit her head, did not loose consciousness and did not having any incontinence.  Her brother who is at bedside explains that for a long time he has noted that his sister, the patient has ambulatory issues. In the ED patient was complaining of Left hip pain and inability to ambulate. Also reports an abscess in her Right groin that she has been self-medicating with Amoxicillin 850mg X 8 day prior to fall.  Although the patient mentions she has no primary doctor she does explain that she has seen eugenia chapa in the past.  She does not recall any visits to the doctor however within the last 10 years.   Self medicating with Furosemide occasionally when she feels bloated. Reports occasional gait issues with bilateral leg "stiffness and imbalance  Denies any fever, chills, nausea, vomiting, headache, abd pain, chest pain, lower extremity swelling.  No recent sick contacts or decrease in po intake.        Currently admitted to medicine with the primary diagnosis of Subtrochanteric fracture of left femur     Today is hospital day 4.     INTERVAL HPI / OVERNIGHT EVENTS:  Patient was examined and seen at bedside. This morning she is resting comfortably in bed and reports no new issues or overnight events.   Patient's abscess culture grew candida, vancomycin stopped, fluconazole added  No fevers yesterday.    Past Medical Hx:   No pertinent past medical history    Past Sx:    Allergies:  No Known Allergies    Current Meds:   Standng Meds:  ampicillin/sulbactam  IVPB 3 Gram(s) IV Intermittent every 6 hours  aspirin  chewable 81 milliGRAM(s) Oral daily  atorvastatin 20 milliGRAM(s) Oral at bedtime  chlorhexidine 4% Liquid 1 Application(s) Topical <User Schedule>  dextrose 5%. 1000 milliLiter(s) (50 mL/Hr) IV Continuous <Continuous>  dextrose 50% Injectable 12.5 Gram(s) IV Push once  dextrose 50% Injectable 25 Gram(s) IV Push once  dextrose 50% Injectable 25 Gram(s) IV Push once  fluconAZOLE IVPB 400 milliGRAM(s) IV Intermittent every 24 hours  heparin  Injectable 5000 Unit(s) SubCutaneous every 12 hours  insulin glargine Injectable (LANTUS) 15 Unit(s) SubCutaneous at bedtime  insulin lispro (HumaLOG) corrective regimen sliding scale   SubCutaneous three times a day before meals  insulin lispro Injectable (HumaLOG) 5 Unit(s) SubCutaneous three times a day before meals  losartan 50 milliGRAM(s) Oral daily    PRN Meds:  acetaminophen   Tablet .. 650 milliGRAM(s) Oral every 6 hours PRN Mild Pain (1 - 3)  ALPRAZolam 0.25 milliGRAM(s) Oral three times a day PRN anxiety  dextrose 40% Gel 15 Gram(s) Oral once PRN Blood Glucose LESS THAN 70 milliGRAM(s)/deciliter  glucagon  Injectable 1 milliGRAM(s) IntraMuscular once PRN Glucose LESS THAN 70 milligrams/deciliter  morphine  - Injectable 2 milliGRAM(s) IV Push every 6 hours PRN Severe Pain (7 - 10)  traMADol 50 milliGRAM(s) Oral every 4 hours PRN Moderate Pain (4 - 6)  zolpidem 5 milliGRAM(s) Oral at bedtime PRN Insomnia    HOME MEDICATIONS:  aspirin 81 mg oral tablet: 1 tab(s) orally once a day      Vital Signs:   T(F): 97.7 (08-05-19 @ 00:00), Max: 99 (08-04-19 @ 15:00)  HR: 83 (08-05-19 @ 00:00) (79 - 87)  BP: 178/83 (08-05-19 @ 00:00) (144/72 - 178/83)  RR: 16 (08-05-19 @ 00:00) (16 - 16)  SpO2: --      08-03-19 @ 07:01  -  08-04-19 @ 07:00  --------------------------------------------------------  IN: 425 mL / OUT: 700 mL / NET: -275 mL    08-04-19 @ 07:01  -  08-05-19 @ 06:34  --------------------------------------------------------  IN: 690 mL / OUT: 1100 mL / NET: -410 mL        Physical Exam:   GENERAL: NAD  HEENT: NCAT  CHEST/LUNG: CTAB  HEART: Regular rate and rhythm; s1 s2 appreciated, No murmurs, rubs, or gallops  ABDOMEN: Soft, Nontender, Nondistended; Bowel sounds present  EXTREMITIES: No LE edema b/l  NERVOUS SYSTEM:  Alert & Oriented X3        Labs:                         8.5    7.01  )-----------( 289      ( 04 Aug 2019 07:04 )             27.0       04 Aug 2019 07:04    146    |  108    |  11     ----------------------------<  131    4.4     |  28     |  0.7      Ca    9.4        04 Aug 2019 07:04            Hemoglobin A1C, Whole Blood: 15.3 % (08-02-19 @ 18:37)  Hemoglobin A1C, Whole Blood: 15.5 % (08-02-19 @ 05:59)      Troponin --, CKMB --, CK 21 08-02-19 @ 18:37  Troponin --, CKMB --, CK 18 08-02-19 @ 10:40              Culture - Blood (collected 08-02-19 @ 05:59)  Source: .Blood None  Preliminary Report (08-03-19 @ 15:03):    No growth to date.    Culture - Blood (collected 08-02-19 @ 05:59)  Source: .Blood None  Preliminary Report (08-03-19 @ 15:03):    No growth to date.        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  As per patient she was walking and tripped on her feet.  She fell and did not hit her head, did not loose consciousness and did not having any incontinence.  Her brother who is at bedside explains that for a long time he has noted that his sister, the patient has ambulatory issues. In the ED patient was complaining of Left hip pain and inability to ambulate. Also reports an abscess in her Right groin that she has been self-medicating with Amoxicillin 850mg X 8 day prior to fall.  Although the patient mentions she has no primary doctor she does explain that she has seen eugenia chapa in the past.  She does not recall any visits to the doctor however within the last 10 years.   Self medicating with Furosemide occasionally when she feels bloated. Reports occasional gait issues with bilateral leg "stiffness and imbalance  Denies any fever, chills, nausea, vomiting, headache, abd pain, chest pain, lower extremity swelling.  No recent sick contacts or decrease in po intake.        Currently admitted to medicine with the primary diagnosis of Subtrochanteric fracture of left femur     Today is hospital day 4.     INTERVAL HPI / OVERNIGHT EVENTS:  No acute events overnight. No complaints this morning. Patient's abscess culture grew candida, currently taking fluconazole. Waiting 4A placement.    Past Medical Hx:   No pertinent past medical history    Past Sx:    Allergies:  No Known Allergies    Current Meds:   Standng Meds:  ampicillin/sulbactam  IVPB 3 Gram(s) IV Intermittent every 6 hours  aspirin  chewable 81 milliGRAM(s) Oral daily  atorvastatin 20 milliGRAM(s) Oral at bedtime  chlorhexidine 4% Liquid 1 Application(s) Topical <User Schedule>  dextrose 5%. 1000 milliLiter(s) (50 mL/Hr) IV Continuous <Continuous>  dextrose 50% Injectable 12.5 Gram(s) IV Push once  dextrose 50% Injectable 25 Gram(s) IV Push once  dextrose 50% Injectable 25 Gram(s) IV Push once  fluconAZOLE IVPB 400 milliGRAM(s) IV Intermittent every 24 hours  heparin  Injectable 5000 Unit(s) SubCutaneous every 12 hours  insulin glargine Injectable (LANTUS) 15 Unit(s) SubCutaneous at bedtime  insulin lispro (HumaLOG) corrective regimen sliding scale   SubCutaneous three times a day before meals  insulin lispro Injectable (HumaLOG) 5 Unit(s) SubCutaneous three times a day before meals  losartan 50 milliGRAM(s) Oral daily    PRN Meds:  acetaminophen   Tablet .. 650 milliGRAM(s) Oral every 6 hours PRN Mild Pain (1 - 3)  ALPRAZolam 0.25 milliGRAM(s) Oral three times a day PRN anxiety  dextrose 40% Gel 15 Gram(s) Oral once PRN Blood Glucose LESS THAN 70 milliGRAM(s)/deciliter  glucagon  Injectable 1 milliGRAM(s) IntraMuscular once PRN Glucose LESS THAN 70 milligrams/deciliter  morphine  - Injectable 2 milliGRAM(s) IV Push every 6 hours PRN Severe Pain (7 - 10)  traMADol 50 milliGRAM(s) Oral every 4 hours PRN Moderate Pain (4 - 6)  zolpidem 5 milliGRAM(s) Oral at bedtime PRN Insomnia    HOME MEDICATIONS:  aspirin 81 mg oral tablet: 1 tab(s) orally once a day      Vital Signs:   T(F): 97.7 (08-05-19 @ 00:00), Max: 99 (08-04-19 @ 15:00)  HR: 83 (08-05-19 @ 00:00) (79 - 87)  BP: 178/83 (08-05-19 @ 00:00) (144/72 - 178/83)  RR: 16 (08-05-19 @ 00:00) (16 - 16)  SpO2: --      08-03-19 @ 07:01  -  08-04-19 @ 07:00  --------------------------------------------------------  IN: 425 mL / OUT: 700 mL / NET: -275 mL    08-04-19 @ 07:01  -  08-05-19 @ 06:34  --------------------------------------------------------  IN: 690 mL / OUT: 1100 mL / NET: -410 mL        Physical Exam:   GENERAL: NAD  HEENT: NCAT  CHEST/LUNG: CTAB  HEART: Regular rate and rhythm; s1 s2 appreciated, No murmurs, rubs, or gallops  ABDOMEN: Soft, Nontender, Nondistended; Bowel sounds present  EXTREMITIES: No LE edema b/l  NERVOUS SYSTEM:  Alert & Oriented X3        Labs:                         8.5    7.01  )-----------( 289      ( 04 Aug 2019 07:04 )             27.0       04 Aug 2019 07:04    146    |  108    |  11     ----------------------------<  131    4.4     |  28     |  0.7      Ca    9.4        04 Aug 2019 07:04            Hemoglobin A1C, Whole Blood: 15.3 % (08-02-19 @ 18:37)  Hemoglobin A1C, Whole Blood: 15.5 % (08-02-19 @ 05:59)      Troponin --, CKMB --, CK 21 08-02-19 @ 18:37  Troponin --, CKMB --, CK 18 08-02-19 @ 10:40              Culture - Blood (collected 08-02-19 @ 05:59)  Source: .Blood None  Preliminary Report (08-03-19 @ 15:03):    No growth to date.    Culture - Blood (collected 08-02-19 @ 05:59)  Source: .Blood None  Preliminary Report (08-03-19 @ 15:03):    No growth to date.

## 2019-08-05 NOTE — PROGRESS NOTE ADULT - SUBJECTIVE AND OBJECTIVE BOX
GUS WILBURN  68y, Female      OVERNIGHT EVENTS:    no fevers, has no groin pain    VITALS:  T(F): 99.3, Max: 99.3 (08-05-19 @ 07:27)  HR: 85  BP: 171/79  RR: 16Vital Signs Last 24 Hrs  T(C): 37.4 (05 Aug 2019 07:27), Max: 37.4 (05 Aug 2019 07:27)  T(F): 99.3 (05 Aug 2019 07:27), Max: 99.3 (05 Aug 2019 07:27)  HR: 85 (05 Aug 2019 07:27) (83 - 87)  BP: 171/79 (05 Aug 2019 07:27) (144/72 - 178/83)  BP(mean): --  RR: 16 (05 Aug 2019 07:27) (16 - 16)  SpO2: --    TESTS & MEASUREMENTS:                        8.5    6.11  )-----------( 275      ( 05 Aug 2019 06:08 )             26.9     08-05    143  |  106  |  10  ----------------------------<  181<H>  4.2   |  24  |  0.6<L>    Ca    9.4      05 Aug 2019 06:08          Culture - Blood (collected 08-02-19 @ 05:59)  Source: .Blood None  Preliminary Report (08-03-19 @ 15:03):    No growth to date.    Culture - Blood (collected 08-02-19 @ 05:59)  Source: .Blood None  Preliminary Report (08-03-19 @ 15:03):    No growth to date.    Culture - Abscess with Gram Stain (collected 08-02-19 @ 00:05)  Source: .Abscess Hip - Right  Preliminary Report (08-04-19 @ 13:10):    Few Presumptive Candida albicans    Culture - Abscess with Gram Stain (collected 08-01-19 @ 19:32)  Source: .Abscess groin  Preliminary Report (08-04-19 @ 12:03):    Moderate Presumptive Candida albicans            RADIOLOGY & ADDITIONAL TESTS:    ANTIBIOTICS:  ampicillin/sulbactam  IVPB 3 Gram(s) IV Intermittent every 6 hours  fluconAZOLE IVPB 400 milliGRAM(s) IV Intermittent every 24 hours

## 2019-08-05 NOTE — CHART NOTE - NSCHARTNOTEFT_GEN_A_CORE
Please change patient's wound dressing twice per day. Instructions: Half-inch iodoform packing strips pack the wound, put a 4x4 gauze on top of the wound, then a Tegaderm on top. Any questions please call surgery green team at [phone].

## 2019-08-05 NOTE — PROGRESS NOTE ADULT - ASSESSMENT
ASSESSMENT  69 yo female with hx of Viral myopathy, DM2, and non-compliance  presenting with L hip fracture and R groin abscess (took amox at home)    IMPRESSION  #R groin abscess    s/p I&D, cx with yeast (did take po amox prior) with presently no ongoing abscess or cellulitis    Sepsis ruled out on admission   #Left hip fracture, s/p OR  #Hyponatremia  # BCX NG  # abscess cultures C albicans    RECOMMENDATIONS    -  fluc 400mg IV daily  - Cont Unasyn 3g q6h  -no long term po or iv ABx

## 2019-08-05 NOTE — PROGRESS NOTE ADULT - ASSESSMENT
68F with right groin abscess s/p bedside I & D and POD 2 s/p intramedullary nailing of femur. Wound is packed and dressed.    Plan:   -Daily packing changes   -Adequate pain control  -Up and out of bed as tolerated

## 2019-08-06 LAB
ALBUMIN SERPL ELPH-MCNC: 2.8 G/DL — LOW (ref 3.5–5.2)
ALP SERPL-CCNC: 96 U/L — SIGNIFICANT CHANGE UP (ref 30–115)
ALT FLD-CCNC: 8 U/L — SIGNIFICANT CHANGE UP (ref 0–41)
ANION GAP SERPL CALC-SCNC: 8 MMOL/L — SIGNIFICANT CHANGE UP (ref 7–14)
AST SERPL-CCNC: 13 U/L — SIGNIFICANT CHANGE UP (ref 0–41)
BILIRUB SERPL-MCNC: 0.3 MG/DL — SIGNIFICANT CHANGE UP (ref 0.2–1.2)
BUN SERPL-MCNC: 11 MG/DL — SIGNIFICANT CHANGE UP (ref 10–20)
CALCIUM SERPL-MCNC: 9.2 MG/DL — SIGNIFICANT CHANGE UP (ref 8.5–10.1)
CHLORIDE SERPL-SCNC: 105 MMOL/L — SIGNIFICANT CHANGE UP (ref 98–110)
CO2 SERPL-SCNC: 27 MMOL/L — SIGNIFICANT CHANGE UP (ref 17–32)
CREAT SERPL-MCNC: 0.6 MG/DL — LOW (ref 0.7–1.5)
GLUCOSE BLDC GLUCOMTR-MCNC: 110 MG/DL — HIGH (ref 70–99)
GLUCOSE BLDC GLUCOMTR-MCNC: 150 MG/DL — HIGH (ref 70–99)
GLUCOSE BLDC GLUCOMTR-MCNC: 202 MG/DL — HIGH (ref 70–99)
GLUCOSE BLDC GLUCOMTR-MCNC: 213 MG/DL — HIGH (ref 70–99)
GLUCOSE SERPL-MCNC: 228 MG/DL — HIGH (ref 70–99)
HCT VFR BLD CALC: 25.1 % — LOW (ref 37–47)
HGB BLD-MCNC: 7.9 G/DL — LOW (ref 12–16)
MAGNESIUM SERPL-MCNC: 1.7 MG/DL — LOW (ref 1.8–2.4)
MCHC RBC-ENTMCNC: 26.6 PG — LOW (ref 27–31)
MCHC RBC-ENTMCNC: 31.5 G/DL — LOW (ref 32–37)
MCV RBC AUTO: 84.5 FL — SIGNIFICANT CHANGE UP (ref 81–99)
NRBC # BLD: 0 /100 WBCS — SIGNIFICANT CHANGE UP (ref 0–0)
PLATELET # BLD AUTO: 259 K/UL — SIGNIFICANT CHANGE UP (ref 130–400)
POTASSIUM SERPL-MCNC: 4.1 MMOL/L — SIGNIFICANT CHANGE UP (ref 3.5–5)
POTASSIUM SERPL-SCNC: 4.1 MMOL/L — SIGNIFICANT CHANGE UP (ref 3.5–5)
PROT SERPL-MCNC: 5.5 G/DL — LOW (ref 6–8)
RBC # BLD: 2.97 M/UL — LOW (ref 4.2–5.4)
RBC # FLD: 13 % — SIGNIFICANT CHANGE UP (ref 11.5–14.5)
SODIUM SERPL-SCNC: 140 MMOL/L — SIGNIFICANT CHANGE UP (ref 135–146)
WBC # BLD: 5.24 K/UL — SIGNIFICANT CHANGE UP (ref 4.8–10.8)
WBC # FLD AUTO: 5.24 K/UL — SIGNIFICANT CHANGE UP (ref 4.8–10.8)

## 2019-08-06 PROCEDURE — 99233 SBSQ HOSP IP/OBS HIGH 50: CPT

## 2019-08-06 RX ORDER — MAGNESIUM SULFATE 500 MG/ML
2 VIAL (ML) INJECTION ONCE
Refills: 0 | Status: COMPLETED | OUTPATIENT
Start: 2019-08-06 | End: 2019-08-06

## 2019-08-06 RX ORDER — DOCUSATE SODIUM 100 MG
100 CAPSULE ORAL DAILY
Refills: 0 | Status: DISCONTINUED | OUTPATIENT
Start: 2019-08-06 | End: 2019-08-14

## 2019-08-06 RX ORDER — INSULIN LISPRO 100/ML
7 VIAL (ML) SUBCUTANEOUS
Refills: 0 | Status: DISCONTINUED | OUTPATIENT
Start: 2019-08-06 | End: 2019-08-12

## 2019-08-06 RX ADMIN — Medication 81 MILLIGRAM(S): at 13:42

## 2019-08-06 RX ADMIN — Medication 25 GRAM(S): at 13:42

## 2019-08-06 RX ADMIN — AMPICILLIN SODIUM AND SULBACTAM SODIUM 200 GRAM(S): 250; 125 INJECTION, POWDER, FOR SUSPENSION INTRAMUSCULAR; INTRAVENOUS at 17:27

## 2019-08-06 RX ADMIN — Medication 100 MILLIGRAM(S): at 17:43

## 2019-08-06 RX ADMIN — AMPICILLIN SODIUM AND SULBACTAM SODIUM 200 GRAM(S): 250; 125 INJECTION, POWDER, FOR SUSPENSION INTRAMUSCULAR; INTRAVENOUS at 00:17

## 2019-08-06 RX ADMIN — Medication 2: at 08:08

## 2019-08-06 RX ADMIN — MORPHINE SULFATE 2 MILLIGRAM(S): 50 CAPSULE, EXTENDED RELEASE ORAL at 17:24

## 2019-08-06 RX ADMIN — ZOLPIDEM TARTRATE 5 MILLIGRAM(S): 10 TABLET ORAL at 21:48

## 2019-08-06 RX ADMIN — HEPARIN SODIUM 5000 UNIT(S): 5000 INJECTION INTRAVENOUS; SUBCUTANEOUS at 06:06

## 2019-08-06 RX ADMIN — FLUCONAZOLE 100 MILLIGRAM(S): 150 TABLET ORAL at 01:50

## 2019-08-06 RX ADMIN — AMPICILLIN SODIUM AND SULBACTAM SODIUM 200 GRAM(S): 250; 125 INJECTION, POWDER, FOR SUSPENSION INTRAMUSCULAR; INTRAVENOUS at 13:47

## 2019-08-06 RX ADMIN — MORPHINE SULFATE 2 MILLIGRAM(S): 50 CAPSULE, EXTENDED RELEASE ORAL at 10:30

## 2019-08-06 RX ADMIN — Medication 2: at 12:30

## 2019-08-06 RX ADMIN — HEPARIN SODIUM 5000 UNIT(S): 5000 INJECTION INTRAVENOUS; SUBCUTANEOUS at 17:27

## 2019-08-06 RX ADMIN — INSULIN GLARGINE 15 UNIT(S): 100 INJECTION, SOLUTION SUBCUTANEOUS at 21:49

## 2019-08-06 RX ADMIN — AMPICILLIN SODIUM AND SULBACTAM SODIUM 200 GRAM(S): 250; 125 INJECTION, POWDER, FOR SUSPENSION INTRAMUSCULAR; INTRAVENOUS at 06:06

## 2019-08-06 RX ADMIN — LOSARTAN POTASSIUM 50 MILLIGRAM(S): 100 TABLET, FILM COATED ORAL at 06:06

## 2019-08-06 RX ADMIN — MORPHINE SULFATE 2 MILLIGRAM(S): 50 CAPSULE, EXTENDED RELEASE ORAL at 10:00

## 2019-08-06 RX ADMIN — AMPICILLIN SODIUM AND SULBACTAM SODIUM 200 GRAM(S): 250; 125 INJECTION, POWDER, FOR SUSPENSION INTRAMUSCULAR; INTRAVENOUS at 23:37

## 2019-08-06 RX ADMIN — Medication 7 UNIT(S): at 12:32

## 2019-08-06 RX ADMIN — ATORVASTATIN CALCIUM 20 MILLIGRAM(S): 80 TABLET, FILM COATED ORAL at 21:49

## 2019-08-06 RX ADMIN — AMLODIPINE BESYLATE 5 MILLIGRAM(S): 2.5 TABLET ORAL at 06:06

## 2019-08-06 RX ADMIN — MORPHINE SULFATE 2 MILLIGRAM(S): 50 CAPSULE, EXTENDED RELEASE ORAL at 17:39

## 2019-08-06 NOTE — PROGRESS NOTE ADULT - SUBJECTIVE AND OBJECTIVE BOX
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  The patient was also evaluated and treated for a right labial abscess likely related to uncontrolled diabetes.  Status post ORIF    Overnight events/Updates: Pending worker's comp approval prior to 4A transfer.  No new complaints    Vital Signs Last 24 Hrs  T(C): 36.4 (06 Aug 2019 07:40), Max: 37.6 (05 Aug 2019 23:00)  T(F): 97.5 (06 Aug 2019 07:40), Max: 99.6 (05 Aug 2019 23:00)  HR: 75 (06 Aug 2019 07:40) (75 - 82)  BP: 148/87 (06 Aug 2019 07:40) (148/87 - 160/68)  BP(mean): --  RR: 18 (06 Aug 2019 07:40) (17 - 18)  SpO2: --    Physical Examination:  General: AAO x 3  HEENT: PERRLA, EOMI  CV= S1 & S2 appreciated  Lungs=CTA BL  Abdominal Examination= + BS, Soft, NT/ND  Extremity Examination= No C/C/E    ROS: No chest pain, no shortness of breath.  All other systems reviewed and are within normal limits except for the complaints in the HPI.    MEDICATIONS  (STANDING):  amLODIPine   Tablet 5 milliGRAM(s) Oral daily  ampicillin/sulbactam  IVPB 3 Gram(s) IV Intermittent every 6 hours  aspirin  chewable 81 milliGRAM(s) Oral daily  atorvastatin 20 milliGRAM(s) Oral at bedtime  chlorhexidine 4% Liquid 1 Application(s) Topical <User Schedule>  dextrose 5%. 1000 milliLiter(s) (50 mL/Hr) IV Continuous <Continuous>  dextrose 50% Injectable 12.5 Gram(s) IV Push once  dextrose 50% Injectable 25 Gram(s) IV Push once  dextrose 50% Injectable 25 Gram(s) IV Push once  fluconAZOLE IVPB 400 milliGRAM(s) IV Intermittent every 24 hours  heparin  Injectable 5000 Unit(s) SubCutaneous every 12 hours  insulin glargine Injectable (LANTUS) 15 Unit(s) SubCutaneous at bedtime  insulin lispro (HumaLOG) corrective regimen sliding scale   SubCutaneous three times a day before meals  insulin lispro Injectable (HumaLOG) 7 Unit(s) SubCutaneous three times a day before meals  losartan 50 milliGRAM(s) Oral daily    MEDICATIONS  (PRN):  acetaminophen   Tablet .. 650 milliGRAM(s) Oral every 6 hours PRN Mild Pain (1 - 3)  ALPRAZolam 0.25 milliGRAM(s) Oral three times a day PRN anxiety  dextrose 40% Gel 15 Gram(s) Oral once PRN Blood Glucose LESS THAN 70 milliGRAM(s)/deciliter  glucagon  Injectable 1 milliGRAM(s) IntraMuscular once PRN Glucose LESS THAN 70 milligrams/deciliter  morphine  - Injectable 2 milliGRAM(s) IV Push every 6 hours PRN Severe Pain (7 - 10)  traMADol 50 milliGRAM(s) Oral every 4 hours PRN Moderate Pain (4 - 6)  zolpidem 5 milliGRAM(s) Oral at bedtime PRN Insomnia                            7.9    5.24  )-----------( 259      ( 06 Aug 2019 05:58 )             25.1     08-06    140  |  105  |  11  ----------------------------<  228<H>  4.1   |  27  |  0.6<L>    Ca    9.2      06 Aug 2019 05:58  Mg     1.7     08-06    TPro  5.5<L>  /  Alb  2.8<L>  /  TBili  0.3  /  DBili  x   /  AST  13  /  ALT  8   /  AlkPhos  96  08-06      Case discussed with housestaff & family  MILTON Steiner [phone]

## 2019-08-06 NOTE — MEDICAL STUDENT PROGRESS NOTE(EDUCATION) - NS MD HP STUD ASPLAN ASSES FT
Patient is a _____ yo... Patient is a 67 yo  F with PMH of DMt2, viral cardiomyopathy with moderate AS, HTN and HLD being assessed for the R inguinal abscess.

## 2019-08-06 NOTE — MEDICAL STUDENT PROGRESS NOTE(EDUCATION) - NS MD HP STUD ASPLAN PLAN FT
Disposition    DVT Prophylaxis  [ ] Subcu Heparin [ ]  LMWH [ ]  Coumadin [ ] Xaeralto [ ] Eliquis [ ] Venodyne pumps    [ ] Discussed with Patient [ ] Family [ ]  [ ] RN [ ]     Advance Directives  [ ] Full Code [ ] DNR [ ] DNI    Care Discussed with Consultants/Other Providers [x] YES  [ ] NO    [  ] Teaching Attending Addendum [x] Present with Resident      I saw and evaluated the patient. Discussed with Resident, Dr. Akers and agree with the resident's findings and plan as documented in the resident's note, with the following revision made as necessary. Disposition  1. Hyperglycemia   - increase insulin 15, 7    DVT Prophylaxis  [ ] Subcu Heparin [ ]  LMWH [ ]  Coumadin [ ] Xaeralto [ ] Eliquis [ ] Venodyne pumps    [ ] Discussed with Patient [ ] Family [ ]  [ ] RN [ ]     Advance Directives  [ ] Full Code [ ] DNR [ ] DNI    Care Discussed with Consultants/Other Providers [x] YES  [ ] NO    [  ] Teaching Attending Addendum [x] Present with Resident      I saw and evaluated the patient. Discussed with Resident, Dr. Akers and agree with the resident's findings and plan as documented in the resident's note, with the following revision made as necessary. Disposition  1. Intertrochantric fracture of the femur s/p IMN of the femur   - daily dressing changes  - rehab with OT 2-3 times/week  - for modern pain: control with tramadol 50 mg q4h prn  -     2. R inguinal abscess   - IV fluconazole  - IV unsyn     3. Hyperglycemia/DMt2  - increase insulin 15, 7    4.   DVT Prophylaxis  [ ] Subcu Heparin [ ]  LMWH [ ]  Coumadin [ ] Xaeralto [ ] Eliquis [ ] Venodyne pumps    [ ] Discussed with Patient [ ] Family [ ]  [ ] RN [ ]     Advance Directives  [ ] Full Code [ ] DNR [ ] DNI    Care Discussed with Consultants/Other Providers [x] YES  [ ] NO    [  ] Teaching Attending Addendum [x] Present with Resident      I saw and evaluated the patient. Discussed with Resident, Dr. Akers and agree with the resident's findings and plan as documented in the resident's note, with the following revision made as necessary. 1. Intertrochantric fracture of the femur s/p IMN of the femur   - daily dressing changes  - rehab with OT 2-3 times/week  - for moderate pain: control with tramadol 50 mg po q4h prn and for severe pain: control with morphine 2mg IV q6h prn     2. R inguinal abscess   - IV fluconazole  - IV unsyn 3 g q6h     3. Hyperglycemia/DMt2  - continue Lantus at 15 units, increase lispro to 7units  - continue bg finger sticks qAC    4. HTN  - continue losartan 50 mg po daily    5. HLD  - continue atorvastatin 20 mg po qhs    6. viral cardiomyopathy  - continue chewable aspirin 81 mg po daily     Disposition  Waiting placement in  and Worker's Compensation  DVT Prophylaxis  [x] Subcu Heparin [ ]  LMWH [ ]  Coumadin [ ] Xaeralto [ ] Eliquis [ ] Venodyne pumps    [ ] Discussed with Patient [ ] Family [ ]  [ ] RN [ ]     Advance Directives  [ ] Full Code [ ] DNR [ ] DNI    Care Discussed with Consultants/Other Providers [x] YES  [ ] NO    [  ] Teaching Attending Addendum [x] Present with Resident      I saw and evaluated the patient. Discussed with Resident, Dr. Akers and agree with the resident's findings and plan as documented in the resident's note, with the following revision made as necessary. 1. Intertrochantric fracture of the femur s/p IMN of the femur   - mechanical fall  - CBC stable, f/u cbc  - daily dressing changes  - rehab with OT 2-3 times/week  - for moderate pain: control with tramadol 50 mg po q4h prn and for severe pain: control with morphine 2mg IV q6h prn   - full weight bearing, AAT  - pending workers comp for 4A placement    2. R inguinal abscess   - IV fluconazole  - IV unsyn 3 g q6h  - s/p i&d  - culture grew candida  - Id following     3. Hyperglycemia/DMt2  - continue Lantus at 15 units, increase lispro to 7units  - continue bg finger sticks qAC    4. HTN  - continue losartan 50 mg po daily  - added amlodipine 5    5. HLD  - continue atorvastatin 20 mg po qhs    6. viral cardiomyopathy  - follow up OP    Disposition  Waiting placement in 4A and Worker's Compensation  DVT Prophylaxis  [x] Subcu Heparin [ ]  LMWH [ ]  Coumadin [ ] Xaeralto [ ] Eliquis [ ] Venodyne pumps    [ ] Discussed with Patient [ ] Family [ ]  [ ] RN [ ]     Advance Directives  [x ] Full Code [ ] DNR [ ] DNI    Care Discussed with Consultants/Other Providers [x] YES  [ ] NO    [  ] Teaching Attending Addendum [x] Present with Resident      I saw and evaluated the patient. Discussed with Resident, Dr. Akers and agree with the resident's findings and plan as documented in the resident's note, with the following revision made as necessary.

## 2019-08-06 NOTE — MEDICAL STUDENT PROGRESS NOTE(EDUCATION) - SUBJECTIVE AND OBJECTIVE BOX
SUBJECTIVE    Description  Patient is a ____ yo...    Interval HPI    HD # POD #    Overnight events    Review of Systems  CONSTITUTIONAL: No fever, weight loss, or fatigue  EYES: No eye pain, visual disturbances, or discharge  ENMT:  No difficulty hearing, tinnitus, vertigo; No sinus or throat pain  NECK: No pain or stiffness  BREASTS: No pain, masses, or nipple discharge  RESPIRATORY: No cough, wheezing, chills or hemoptysis; No shortness of breath  CARDIOVASCULAR: No chest pain, palpitations, dizziness, or leg swelling  GASTROINTESTINAL: No abdominal or epigastric pain. No nausea, vomiting, or hematemesis; No diarrhea or constipation. No melena or hematochezia.  GENITOURINARY: No dysuria, frequency, hematuria, or incontinence  NEUROLOGICAL: No headaches, memory loss, loss of strength, numbness, or tremors  SKIN: No itching, burning, rashes, or lesions   LYMPH NODES: No enlarged glands  ENDOCRINE: No heat or cold intolerance; No hair loss  MUSCULOSKELETAL: No joint pain or swelling; No muscle, back, or extremity pain  PSYCHIATRIC: No depression, anxiety, mood swings, or difficulty sleeping  HEME/LYMPH: No easy bruising, or bleeding gums  ALLERGY AND IMMUNOLOGIC: No hives or eczema    OBJECTIVE    Physical Exam  T(C): 36.4 (08-06-19 @ 07:40), Max: 37.6 (08-05-19 @ 23:00)  HR: 75 (08-06-19 @ 07:40) (75 - 82)  BP: 148/87 (08-06-19 @ 07:40) (148/87 - 160/68)  RR: 18 (08-06-19 @ 07:40) (17 - 18)  SpO2: --  Wt(kg): --  I&O's Summary    05 Aug 2019 07:01  -  06 Aug 2019 07:00  --------------------------------------------------------  IN: 450 mL / OUT: 1700 mL / NET: -1250 mL    06 Aug 2019 07:01  -  06 Aug 2019 13:31  --------------------------------------------------------  IN: 480 mL / OUT: 400 mL / NET: 80 mL    GENERAL: NAD, well-groomed, well-developed  HEAD:  Atraumatic, Normocephalic  EYES: EOMI, PERRLA, conjunctiva and sclera clear  ENMT: No tonsillar erythema, exudates, or enlargement; Moist mucous membranes, Good dentition, No lesions  NECK: Supple, No JVD, Normal thyroid  NERVOUS SYSTEM:  Alert & Oriented X3, Good concentration; Motor Strength 5/5 B/L upper and lower extremities; DTRs 2+ intact and symmetric  CHEST/LUNG: Clear to percussion bilaterally; No rales, rhonchi, wheezing, or rubs  HEART: Regular rate and rhythm; No murmurs, rubs, or gallops  ABDOMEN: Soft, Nontender, Nondistended; Bowel sounds present  EXTREMITIES:  2+ Peripheral Pulses, No clubbing, cyanosis, or edema  LYMPH: No lymphadenopathy noted  SKIN: No rashes or lesions    Labs    Diet    Radiology and other tests    Consultant(s) Notes Reviewed    MEDICATIONS  (STANDING):  amLODIPine   Tablet 5 milliGRAM(s) Oral daily  ampicillin/sulbactam  IVPB 3 Gram(s) IV Intermittent every 6 hours  aspirin  chewable 81 milliGRAM(s) Oral daily  atorvastatin 20 milliGRAM(s) Oral at bedtime  chlorhexidine 4% Liquid 1 Application(s) Topical <User Schedule>  dextrose 5%. 1000 milliLiter(s) (50 mL/Hr) IV Continuous <Continuous>  dextrose 50% Injectable 12.5 Gram(s) IV Push once  dextrose 50% Injectable 25 Gram(s) IV Push once  dextrose 50% Injectable 25 Gram(s) IV Push once  fluconAZOLE IVPB 400 milliGRAM(s) IV Intermittent every 24 hours  heparin  Injectable 5000 Unit(s) SubCutaneous every 12 hours  insulin glargine Injectable (LANTUS) 15 Unit(s) SubCutaneous at bedtime  insulin lispro (HumaLOG) corrective regimen sliding scale   SubCutaneous three times a day before meals  insulin lispro Injectable (HumaLOG) 7 Unit(s) SubCutaneous three times a day before meals  losartan 50 milliGRAM(s) Oral daily  magnesium sulfate  IVPB 2 Gram(s) IV Intermittent once    MEDICATIONS  (PRN):  acetaminophen   Tablet .. 650 milliGRAM(s) Oral every 6 hours PRN Mild Pain (1 - 3)  ALPRAZolam 0.25 milliGRAM(s) Oral three times a day PRN anxiety  dextrose 40% Gel 15 Gram(s) Oral once PRN Blood Glucose LESS THAN 70 milliGRAM(s)/deciliter  glucagon  Injectable 1 milliGRAM(s) IntraMuscular once PRN Glucose LESS THAN 70 milligrams/deciliter  morphine  - Injectable 2 milliGRAM(s) IV Push every 6 hours PRN Severe Pain (7 - 10)  traMADol 50 milliGRAM(s) Oral every 4 hours PRN Moderate Pain (4 - 6)  zolpidem 5 milliGRAM(s) Oral at bedtime PRN Insomnia SUBJECTIVE    Description  Patient is a 69 yo  F with PMH of DMt2, viral cardiomyopathy with moderate AS, HTN and HLD who presented on 8/1 for a intertrochanteric fracture of the L hip secondary to a fall s/p ORIF and intramedullary nailing of the femur. She also has a R groin abscess.    Interval HPI  Patient has adequate pain control, however, patient has increased blood glucose levels on finger sticks which she attributes to being in pain.      HD #5 POD #3    Overnight events  No acute events overnight.    Review of Systems  CONSTITUTIONAL: No fever, weight loss, or fatigue  EYES: No eye pain, visual disturbances, or discharge  ENMT:  No difficulty hearing, tinnitus, vertigo; No sinus or throat pain  NECK: No pain or stiffness  BREASTS: No pain, masses, or nipple discharge  RESPIRATORY: No cough, wheezing, chills or hemoptysis; No shortness of breath  CARDIOVASCULAR: No chest pain, palpitations, dizziness, or leg swelling  GASTROINTESTINAL: No abdominal or epigastric pain. No nausea, vomiting, or hematemesis; No diarrhea or constipation. No melena or hematochezia.  GENITOURINARY: No dysuria, frequency, hematuria, or incontinence  NEUROLOGICAL: No headaches, memory loss, loss of strength, numbness, or tremors  SKIN: No itching, burning, rashes, draining abscess on the R side groin  LYMPH NODES: No enlarged glands  ENDOCRINE: No heat or cold intolerance; No hair loss  MUSCULOSKELETAL: No joint pain or swelling; No muscle, back, or extremity pain  PSYCHIATRIC: No depression, anxiety, mood swings, or difficulty sleeping  HEME/LYMPH: No easy bruising, or bleeding gums  ALLERGY AND IMMUNOLOGIC: No hives or eczema    OBJECTIVE    Physical Exam  T(C): 36.4 (08-06-19 @ 07:40), Max: 37.6 (08-05-19 @ 23:00)  HR: 75 (08-06-19 @ 07:40) (75 - 82)  BP: 148/87 (08-06-19 @ 07:40) (148/87 - 160/68)  RR: 18 (08-06-19 @ 07:40) (17 - 18)  SpO2: --  Wt(kg): --  I&O's Summary    05 Aug 2019 07:01  -  06 Aug 2019 07:00  --------------------------------------------------------  IN: 450 mL / OUT: 1700 mL / NET: -1250 mL    06 Aug 2019 07:01  -  06 Aug 2019 13:31  --------------------------------------------------------  IN: 480 mL / OUT: 400 mL / NET: 80 mL    GENERAL: NAD, well-groomed, well-developed  HEAD:  Atraumatic, Normocephalic  EYES: EOMI, PERRLA, conjunctiva and sclera clear  ENMT: No tonsillar erythema, exudates, or enlargement; Moist mucous membranes, Good dentition, No lesions  NECK: Supple, No JVD, Normal thyroid  NERVOUS SYSTEM:  Alert & Oriented X3, Good concentration; Motor Strength 5/5 B/L upper and lower extremities; DTRs 2+ intact and symmetric  CHEST/LUNG: Clear to percussion bilaterally; No rales, rhonchi, wheezing, or rubs  HEART: Regular rate and rhythm; +systolic murmur best appreciated at the LLSB, rubs, or gallops  ABDOMEN: Soft, Nontender, Nondistended; Bowel sounds present  EXTREMITIES:  2+ Peripheral Pulses, No clubbing, cyanosis, or edema  LYMPH: No lymphadenopathy noted  SKIN: + R inguinal abscess draining mild amount of white-yellowish pus    Labs  Complete Blood Count in AM (08.06.19 @ 05:58)    Nucleated RBC: 0 /100 WBCs    WBC Count: 5.24 K/uL    RBC Count: 2.97 M/uL    Hemoglobin: 7.9 g/dL    Hematocrit: 25.1 %    Mean Cell Volume: 84.5 fL    Mean Cell Hemoglobin: 26.6 pg    Mean Cell Hemoglobin Conc: 31.5 g/dL    Red Cell Distrib Width: 13.0 %    Platelet Count - Automated: 259 K/uL    Diet    Radiology and other tests  no recent    Consultant(s) Notes Reviewed  Ortho: change dressing of the surgical area daily and change packing of the abscess daily  ID: continue fluconazole 400 mg and unsyn   OT: 2-3/week in rehab facility   Wound care: entered instructions of how to pack the abscess    MEDICATIONS  (STANDING):  amLODIPine   Tablet 5 milliGRAM(s) Oral daily  ampicillin/sulbactam  IVPB 3 Gram(s) IV Intermittent every 6 hours  aspirin  chewable 81 milliGRAM(s) Oral daily  atorvastatin 20 milliGRAM(s) Oral at bedtime  chlorhexidine 4% Liquid 1 Application(s) Topical <User Schedule>  dextrose 5%. 1000 milliLiter(s) (50 mL/Hr) IV Continuous <Continuous>  dextrose 50% Injectable 12.5 Gram(s) IV Push once  dextrose 50% Injectable 25 Gram(s) IV Push once  dextrose 50% Injectable 25 Gram(s) IV Push once  fluconAZOLE IVPB 400 milliGRAM(s) IV Intermittent every 24 hours  heparin  Injectable 5000 Unit(s) SubCutaneous every 12 hours  insulin glargine Injectable (LANTUS) 15 Unit(s) SubCutaneous at bedtime  insulin lispro (HumaLOG) corrective regimen sliding scale   SubCutaneous three times a day before meals  insulin lispro Injectable (HumaLOG) 7 Unit(s) SubCutaneous three times a day before meals  losartan 50 milliGRAM(s) Oral daily  magnesium sulfate  IVPB 2 Gram(s) IV Intermittent once    MEDICATIONS  (PRN):  acetaminophen   Tablet .. 650 milliGRAM(s) Oral every 6 hours PRN Mild Pain (1 - 3)  ALPRAZolam 0.25 milliGRAM(s) Oral three times a day PRN anxiety  dextrose 40% Gel 15 Gram(s) Oral once PRN Blood Glucose LESS THAN 70 milliGRAM(s)/deciliter  glucagon  Injectable 1 milliGRAM(s) IntraMuscular once PRN Glucose LESS THAN 70 milligrams/deciliter  morphine  - Injectable 2 milliGRAM(s) IV Push every 6 hours PRN Severe Pain (7 - 10)  traMADol 50 milliGRAM(s) Oral every 4 hours PRN Moderate Pain (4 - 6)  zolpidem 5 milliGRAM(s) Oral at bedtime PRN Insomnia SUBJECTIVE    Description  Patient is a 67 yo  F with PMH of DMt2, viral cardiomyopathy with moderate AS, HTN and HLD who presented on 8/1 for a intertrochanteric fracture of the L hip secondary to a fall s/p ORIF and intramedullary nailing of the femur. She also has a R groin abscess.    Interval HPI  Patient has adequate pain control, however, patient has increased blood glucose levels on finger sticks which she attributes to being in pain.      HD #5 POD #3    Overnight events  No acute events overnight.    Review of Systems  CONSTITUTIONAL: No fever, weight loss, or fatigue  EYES: No eye pain, visual disturbances, or discharge  ENMT:  No difficulty hearing, tinnitus, vertigo; No sinus or throat pain  NECK: No pain or stiffness  BREASTS: No pain, masses, or nipple discharge  RESPIRATORY: No cough, wheezing, chills or hemoptysis; No shortness of breath  CARDIOVASCULAR: No chest pain, palpitations, dizziness, or leg swelling  GASTROINTESTINAL: No abdominal or epigastric pain. No nausea, vomiting, or hematemesis; No diarrhea or constipation. No melena or hematochezia.  GENITOURINARY: No dysuria, frequency, hematuria, or incontinence  NEUROLOGICAL: No headaches, memory loss, loss of strength, numbness, or tremors  SKIN: No itching, burning, rashes, draining abscess on the R side groin, +L hip pain  LYMPH NODES: No enlarged glands  ENDOCRINE: No heat or cold intolerance; No hair loss  MUSCULOSKELETAL: No joint pain or swelling; No muscle, back, or extremity pain  PSYCHIATRIC: No depression, anxiety, mood swings, or difficulty sleeping  HEME/LYMPH: No easy bruising, or bleeding gums  ALLERGY AND IMMUNOLOGIC: No hives or eczema    OBJECTIVE    Physical Exam  T(C): 36.4 (08-06-19 @ 07:40), Max: 37.6 (08-05-19 @ 23:00)  HR: 75 (08-06-19 @ 07:40) (75 - 82)  BP: 148/87 (08-06-19 @ 07:40) (148/87 - 160/68)  RR: 18 (08-06-19 @ 07:40) (17 - 18)  SpO2: --  Wt(kg): --  I&O's Summary    05 Aug 2019 07:01  -  06 Aug 2019 07:00  --------------------------------------------------------  IN: 450 mL / OUT: 1700 mL / NET: -1250 mL    06 Aug 2019 07:01  -  06 Aug 2019 13:31  --------------------------------------------------------  IN: 480 mL / OUT: 400 mL / NET: 80 mL    GENERAL: NAD, well-groomed, well-developed  HEAD:  Atraumatic, Normocephalic  EYES: EOMI, PERRLA, conjunctiva and sclera clear  ENMT: No tonsillar erythema, exudates, or enlargement; Moist mucous membranes, Good dentition, No lesions  NECK: Supple, No JVD, Normal thyroid  NERVOUS SYSTEM:  Alert & Oriented X3, Good concentration; Motor Strength 5/5 B/L upper and lower extremities; DTRs 2+ intact and symmetric  CHEST/LUNG: Clear to percussion bilaterally; No rales, rhonchi, wheezing, or rubs  HEART: Regular rate and rhythm; +systolic murmur best appreciated at the LLSB, rubs, or gallops  ABDOMEN: Soft, Nontender, Nondistended; Bowel sounds present  EXTREMITIES:  2+ Peripheral Pulses, No clubbing, cyanosis, or edema  LYMPH: No lymphadenopathy noted  SKIN: + R inguinal abscess draining mild amount of white-yellowish pus, +L hip tenderness but surgical site is clean and intact    Labs  Complete Blood Count in AM (08.06.19 @ 05:58)    Nucleated RBC: 0 /100 WBCs    WBC Count: 5.24 K/uL    RBC Count: 2.97 M/uL    Hemoglobin: 7.9 g/dL    Hematocrit: 25.1 %    Mean Cell Volume: 84.5 fL    Mean Cell Hemoglobin: 26.6 pg    Mean Cell Hemoglobin Conc: 31.5 g/dL    Red Cell Distrib Width: 13.0 %    Platelet Count - Automated: 259 K/uL    Diet    Radiology and other tests  no recent    Consultant(s) Notes Reviewed  Ortho: change dressing of the surgical area daily and change packing of the abscess daily  ID: continue fluconazole 400 mg and unsyn   OT: 2-3/week in rehab facility   Wound care: entered instructions of how to pack the abscess    MEDICATIONS  (STANDING):  amLODIPine   Tablet 5 milliGRAM(s) Oral daily  ampicillin/sulbactam  IVPB 3 Gram(s) IV Intermittent every 6 hours  aspirin  chewable 81 milliGRAM(s) Oral daily  atorvastatin 20 milliGRAM(s) Oral at bedtime  chlorhexidine 4% Liquid 1 Application(s) Topical <User Schedule>  dextrose 5%. 1000 milliLiter(s) (50 mL/Hr) IV Continuous <Continuous>  dextrose 50% Injectable 12.5 Gram(s) IV Push once  dextrose 50% Injectable 25 Gram(s) IV Push once  dextrose 50% Injectable 25 Gram(s) IV Push once  fluconAZOLE IVPB 400 milliGRAM(s) IV Intermittent every 24 hours  heparin  Injectable 5000 Unit(s) SubCutaneous every 12 hours  insulin glargine Injectable (LANTUS) 15 Unit(s) SubCutaneous at bedtime  insulin lispro (HumaLOG) corrective regimen sliding scale   SubCutaneous three times a day before meals  insulin lispro Injectable (HumaLOG) 7 Unit(s) SubCutaneous three times a day before meals  losartan 50 milliGRAM(s) Oral daily  magnesium sulfate  IVPB 2 Gram(s) IV Intermittent once    MEDICATIONS  (PRN):  acetaminophen   Tablet .. 650 milliGRAM(s) Oral every 6 hours PRN Mild Pain (1 - 3)  ALPRAZolam 0.25 milliGRAM(s) Oral three times a day PRN anxiety  dextrose 40% Gel 15 Gram(s) Oral once PRN Blood Glucose LESS THAN 70 milliGRAM(s)/deciliter  glucagon  Injectable 1 milliGRAM(s) IntraMuscular once PRN Glucose LESS THAN 70 milligrams/deciliter  morphine  - Injectable 2 milliGRAM(s) IV Push every 6 hours PRN Severe Pain (7 - 10)  traMADol 50 milliGRAM(s) Oral every 4 hours PRN Moderate Pain (4 - 6)  zolpidem 5 milliGRAM(s) Oral at bedtime PRN Insomnia SUBJECTIVE    Description  Patient is a 69 yo  F with PMH of DMt2, viral cardiomyopathy with moderate AS, HTN and HLD who presented on 8/1 for a intertrochanteric fracture of the L hip secondary to a fall s/p ORIF and intramedullary nailing of the femur. She also has a R groin abscess.    Interval HPI  Patient has adequate pain control, however, patient has increased blood glucose levels on finger sticks which she attributes to being in pain.      HD #5 POD #3    Overnight events  No acute events overnight.    Review of Systems  CONSTITUTIONAL: No fever, weight loss, or fatigue  EYES: No eye pain, visual disturbances, or discharge  ENMT:  No difficulty hearing, tinnitus, vertigo; No sinus or throat pain  NECK: No pain or stiffness  BREASTS: No pain, masses, or nipple discharge  RESPIRATORY: No cough, wheezing, chills or hemoptysis; No shortness of breath  CARDIOVASCULAR: No chest pain, palpitations, dizziness, or leg swelling  GASTROINTESTINAL: No abdominal or epigastric pain. No nausea, vomiting, or hematemesis; No diarrhea or constipation. No melena or hematochezia.  GENITOURINARY: No dysuria, frequency, hematuria, or incontinence  NEUROLOGICAL: No headaches, memory loss, loss of strength, numbness, or tremors  SKIN: No itching, burning, rashes, draining abscess on the R side groin, +L hip pain  LYMPH NODES: No enlarged glands  ENDOCRINE: No heat or cold intolerance; No hair loss  MUSCULOSKELETAL: No joint pain or swelling; No muscle, back, or extremity pain  PSYCHIATRIC: No depression, anxiety, mood swings, or difficulty sleeping  HEME/LYMPH: No easy bruising, or bleeding gums  ALLERGY AND IMMUNOLOGIC: No hives or eczema    OBJECTIVE    Physical Exam  T(C): 36.4 (08-06-19 @ 07:40), Max: 37.6 (08-05-19 @ 23:00)  HR: 75 (08-06-19 @ 07:40) (75 - 82)  BP: 148/87 (08-06-19 @ 07:40) (148/87 - 160/68)  RR: 18 (08-06-19 @ 07:40) (17 - 18)  SpO2: --  Wt(kg): --  I&O's Summary    05 Aug 2019 07:01  -  06 Aug 2019 07:00  --------------------------------------------------------  IN: 450 mL / OUT: 1700 mL / NET: -1250 mL    06 Aug 2019 07:01  -  06 Aug 2019 13:31  --------------------------------------------------------  IN: 480 mL / OUT: 400 mL / NET: 80 mL    GENERAL: NAD, well-groomed, well-developed  HEAD:  Atraumatic, Normocephalic  EYES: EOMI, PERRLA, conjunctiva and sclera clear  ENMT: No tonsillar erythema, exudates, or enlargement; Moist mucous membranes, Good dentition, No lesions  NECK: Supple, No JVD, Normal thyroid  NERVOUS SYSTEM:  Alert & Oriented X3, Good concentration; Motor Strength 5/5 B/L upper and lower extremities; DTRs 2+ intact and symmetric  CHEST/LUNG: Clear to percussion bilaterally; No rales, rhonchi, wheezing, or rubs  HEART: Regular rate and rhythm; +systolic murmur best appreciated at the LLSB, rubs, or gallops  ABDOMEN: Soft, Nontender, Nondistended; Bowel sounds present  EXTREMITIES:  2+ Peripheral Pulses, No clubbing, cyanosis, or edema  LYMPH: No lymphadenopathy noted  SKIN: + R inguinal abscess draining mild amount of white-yellowish pus, +L hip tenderness but surgical site is clean and intact    Labs  Complete Blood Count in AM (08.06.19 @ 05:58)    Nucleated RBC: 0 /100 WBCs    WBC Count: 5.24 K/uL    RBC Count: 2.97 M/uL    Hemoglobin: 7.9 g/dL    Hematocrit: 25.1 %    Mean Cell Volume: 84.5 fL    Mean Cell Hemoglobin: 26.6 pg    Mean Cell Hemoglobin Conc: 31.5 g/dL    Red Cell Distrib Width: 13.0 %    Platelet Count - Automated: 259 K/uL    Diet  Carbohydrate consistent    Radiology and other tests  no recent    Consultant(s) Notes Reviewed  Ortho: change dressing of the surgical area daily and change packing of the abscess daily  ID: continue fluconazole 400 mg and unsyn   OT: 2-3/week in rehab facility   Wound care: entered instructions of how to pack the abscess    MEDICATIONS  (STANDING):  amLODIPine   Tablet 5 milliGRAM(s) Oral daily  ampicillin/sulbactam  IVPB 3 Gram(s) IV Intermittent every 6 hours  aspirin  chewable 81 milliGRAM(s) Oral daily  atorvastatin 20 milliGRAM(s) Oral at bedtime  chlorhexidine 4% Liquid 1 Application(s) Topical <User Schedule>  dextrose 5%. 1000 milliLiter(s) (50 mL/Hr) IV Continuous <Continuous>  dextrose 50% Injectable 12.5 Gram(s) IV Push once  dextrose 50% Injectable 25 Gram(s) IV Push once  dextrose 50% Injectable 25 Gram(s) IV Push once  fluconAZOLE IVPB 400 milliGRAM(s) IV Intermittent every 24 hours  heparin  Injectable 5000 Unit(s) SubCutaneous every 12 hours  insulin glargine Injectable (LANTUS) 15 Unit(s) SubCutaneous at bedtime  insulin lispro (HumaLOG) corrective regimen sliding scale   SubCutaneous three times a day before meals  insulin lispro Injectable (HumaLOG) 7 Unit(s) SubCutaneous three times a day before meals  losartan 50 milliGRAM(s) Oral daily  magnesium sulfate  IVPB 2 Gram(s) IV Intermittent once    MEDICATIONS  (PRN):  acetaminophen   Tablet .. 650 milliGRAM(s) Oral every 6 hours PRN Mild Pain (1 - 3)  ALPRAZolam 0.25 milliGRAM(s) Oral three times a day PRN anxiety  dextrose 40% Gel 15 Gram(s) Oral once PRN Blood Glucose LESS THAN 70 milliGRAM(s)/deciliter  glucagon  Injectable 1 milliGRAM(s) IntraMuscular once PRN Glucose LESS THAN 70 milligrams/deciliter  morphine  - Injectable 2 milliGRAM(s) IV Push every 6 hours PRN Severe Pain (7 - 10)  traMADol 50 milliGRAM(s) Oral every 4 hours PRN Moderate Pain (4 - 6)  zolpidem 5 milliGRAM(s) Oral at bedtime PRN Insomnia SUBJECTIVE    Description  Patient is a 69 yo  F with PMH of DMt2, viral cardiomyopathy with moderate AS, HTN and HLD who presented on 8/1 for a intertrochanteric fracture of the L hip secondary to a fall s/p ORIF and intramedullary nailing of the femur. She also has a R groin abscess.    Interval HPI  Patient has adequate pain control, however, patient has increased blood glucose levels on finger sticks which she attributes to being in pain.      HD #5 POD #3    Overnight events  No acute events overnight.    OBJECTIVE    Physical Exam  T(C): 36.4 (08-06-19 @ 07:40), Max: 37.6 (08-05-19 @ 23:00)  HR: 75 (08-06-19 @ 07:40) (75 - 82)  BP: 148/87 (08-06-19 @ 07:40) (148/87 - 160/68)  RR: 18 (08-06-19 @ 07:40) (17 - 18)  SpO2: --  Wt(kg): --  I&O's Summary    05 Aug 2019 07:01  -  06 Aug 2019 07:00  --------------------------------------------------------  IN: 450 mL / OUT: 1700 mL / NET: -1250 mL    06 Aug 2019 07:01  -  06 Aug 2019 13:31  --------------------------------------------------------  IN: 480 mL / OUT: 400 mL / NET: 80 mL    GENERAL: NAD  HEAD:  NCAT  NERVOUS SYSTEM:  Alert & Oriented X3, Good concentration  CHEST/LUNG: Clear to percussion bilaterally; No rales, rhonchi, wheezing, or rubs  HEART: Regular rate and rhythm; +systolic murmur best appreciated at the LLSB, rubs, or gallops  ABDOMEN: Soft, Nontender, Nondistended; Bowel sounds present  EXTREMITIES:  2+ Peripheral Pulses, No clubbing, cyanosis, or edema  SKIN: + R inguinal abscess draining mild amount of white-yellowish pus, +L hip tenderness but surgical site is clean and intact    Labs  Complete Blood Count in AM (08.06.19 @ 05:58)    Nucleated RBC: 0 /100 WBCs    WBC Count: 5.24 K/uL    RBC Count: 2.97 M/uL    Hemoglobin: 7.9 g/dL    Hematocrit: 25.1 %    Mean Cell Volume: 84.5 fL    Mean Cell Hemoglobin: 26.6 pg    Mean Cell Hemoglobin Conc: 31.5 g/dL    Red Cell Distrib Width: 13.0 %    Platelet Count - Automated: 259 K/uL    Diet  Carbohydrate consistent    Radiology and other tests  no recent    Consultant(s) Notes Reviewed  Ortho: change dressing of the surgical area daily and change packing of the abscess daily  ID: continue fluconazole 400 mg and unsyn   OT: 2-3/week in rehab facility   Wound care: entered instructions of how to pack the abscess    MEDICATIONS  (STANDING):  amLODIPine   Tablet 5 milliGRAM(s) Oral daily  ampicillin/sulbactam  IVPB 3 Gram(s) IV Intermittent every 6 hours  aspirin  chewable 81 milliGRAM(s) Oral daily  atorvastatin 20 milliGRAM(s) Oral at bedtime  chlorhexidine 4% Liquid 1 Application(s) Topical <User Schedule>  dextrose 5%. 1000 milliLiter(s) (50 mL/Hr) IV Continuous <Continuous>  dextrose 50% Injectable 12.5 Gram(s) IV Push once  dextrose 50% Injectable 25 Gram(s) IV Push once  dextrose 50% Injectable 25 Gram(s) IV Push once  fluconAZOLE IVPB 400 milliGRAM(s) IV Intermittent every 24 hours  heparin  Injectable 5000 Unit(s) SubCutaneous every 12 hours  insulin glargine Injectable (LANTUS) 15 Unit(s) SubCutaneous at bedtime  insulin lispro (HumaLOG) corrective regimen sliding scale   SubCutaneous three times a day before meals  insulin lispro Injectable (HumaLOG) 7 Unit(s) SubCutaneous three times a day before meals  losartan 50 milliGRAM(s) Oral daily  magnesium sulfate  IVPB 2 Gram(s) IV Intermittent once    MEDICATIONS  (PRN):  acetaminophen   Tablet .. 650 milliGRAM(s) Oral every 6 hours PRN Mild Pain (1 - 3)  ALPRAZolam 0.25 milliGRAM(s) Oral three times a day PRN anxiety  dextrose 40% Gel 15 Gram(s) Oral once PRN Blood Glucose LESS THAN 70 milliGRAM(s)/deciliter  glucagon  Injectable 1 milliGRAM(s) IntraMuscular once PRN Glucose LESS THAN 70 milligrams/deciliter  morphine  - Injectable 2 milliGRAM(s) IV Push every 6 hours PRN Severe Pain (7 - 10)  traMADol 50 milliGRAM(s) Oral every 4 hours PRN Moderate Pain (4 - 6)  zolpidem 5 milliGRAM(s) Oral at bedtime PRN Insomnia

## 2019-08-06 NOTE — PROGRESS NOTE ADULT - SUBJECTIVE AND OBJECTIVE BOX
GUS WILBURN  68y, Female  Allergy: No Known Allergies      CHIEF COMPLAINT: mechanical fall (06 Aug 2019 07:31)      INTERVAL EVENTS/HPI  - No acute events overnight  - T(F): , Max: 99.6 (08-05-19 @ 23:00)  - Denies any worsening symptoms  - Tolerating medication  - WBC Count: 5.24 K/uL (08-06-19 @ 05:58)      ROS  General: Denies rigors, nightsweats  HEENT: Denies headache, rhinorrhea, sore throat, eye pain  CV: Denies CP, palpitations  PULM: Denies SOB, wheezing  GI: Denies abdominal pain, hematochezia/melena  : Denies dysuria, hematuria  MSK: Denies arthralgias, myalgias  SKIN: Denies rash, lesions  NEURO: Denies paresthesias, weakness  PSYCH: Denies depression, anxiety    VITALS:  T(F): 97.5, Max: 99.6 (08-05-19 @ 23:00)  HR: 75  BP: 148/87  RR: 18Vital Signs Last 24 Hrs  T(C): 36.4 (06 Aug 2019 07:40), Max: 37.6 (05 Aug 2019 23:00)  T(F): 97.5 (06 Aug 2019 07:40), Max: 99.6 (05 Aug 2019 23:00)  HR: 75 (06 Aug 2019 07:40) (75 - 82)  BP: 148/87 (06 Aug 2019 07:40) (148/87 - 160/68)  BP(mean): --  RR: 18 (06 Aug 2019 07:40) (17 - 18)  SpO2: --    PHYSICAL EXAM:  Gen: NAD, resting in bed  HEENT: Normocephalic, atraumatic  Neck: supple, no lymphadenopathy  CV: Regular rate & regular rhythm  Lungs: decreased BS at bases, no fremitus  Abdomen: Soft, BS present  Ext: Warm, well perfused, R groin abscess improved  : vaginal prolapse  Neuro: non focal, awake  Skin: no rash, no erythema  Lines: no phlebitis    FH: Non-contributory  Social Hx: Non-contributory    TESTS & MEASUREMENTS:                        7.9    5.24  )-----------( 259      ( 06 Aug 2019 05:58 )             25.1     08-06    140  |  105  |  11  ----------------------------<  228<H>  4.1   |  27  |  0.6<L>    Ca    9.2      06 Aug 2019 05:58  Mg     1.7     08-06    TPro  5.5<L>  /  Alb  2.8<L>  /  TBili  0.3  /  DBili  x   /  AST  13  /  ALT  8   /  AlkPhos  96  08-06    eGFR if Non African American: 95 mL/min/1.73M2 (08-06-19 @ 05:58)  eGFR if African American: 110 mL/min/1.73M2 (08-06-19 @ 05:58)    LIVER FUNCTIONS - ( 06 Aug 2019 05:58 )  Alb: 2.8 g/dL / Pro: 5.5 g/dL / ALK PHOS: 96 U/L / ALT: 8 U/L / AST: 13 U/L / GGT: x               Culture - Blood (collected 08-02-19 @ 05:59)  Source: .Blood None  Preliminary Report (08-03-19 @ 15:03):    No growth to date.    Culture - Blood (collected 08-02-19 @ 05:59)  Source: .Blood None  Preliminary Report (08-03-19 @ 15:03):    No growth to date.    Culture - Abscess with Gram Stain (collected 08-02-19 @ 00:05)  Source: .Abscess Hip - Right  Preliminary Report (08-04-19 @ 13:10):    Few Presumptive Candida albicans    Culture - Abscess with Gram Stain (collected 08-01-19 @ 19:32)  Source: .Abscess groin  Preliminary Report (08-04-19 @ 12:03):    Moderate Presumptive Candida albicans        Blood Gas Venous - Lactate: 0.9 mmoL/L (08-01-19 @ 12:17)      INFECTIOUS DISEASES TESTING      RADIOLOGY & ADDITIONAL TESTS:  I have personally reviewed the last Chest xray  CXR      CT      CARDIOLOGY TESTING  Transthoracic Echocardiogram:    EXAM:  2-D ECHO (TTE) COMPLETE        PROCEDURE DATE:  08/02/2019      INTERPRETATION:  REPORT:    TRANSTHORACIC ECHOCARDIOGRAM REPORT         Patient Name:   GUS WILBURN Accession #: [id number]  Medical Rec #:  [id number]    Height:      63.0 in 160.0 cm  YOB: 1950  Weight:      130.0 lb 58.97 kg  Patient Age:    68 years    BSA:         1.61 m²  Patient Gender: F           BP:          132/70 mmHg       Date of Exam:        8/2/2019 9:11:06 AM  Referring Physician: [id number] ERNESTO CATALAN  Sonographer:         ALEJANDRA  Fellow:              HANK Viera Physician:   Spring Arrington M.D.    Procedure:   2D Echo/Doppler/Color Doppler Complete.  Indications: R57.0 - Cardiogenic Shock  Diagnosis:   Cardiogenic shock - R57.0         Summary:   1. Left ventricular ejection fraction, by visual estimation, is 55 to   60%.   2. LV Ejection Fraction by Renner's Method with a biplane EF of 58 %.   3. Mildly increased LV wall thickness.   4. Spectral Doppler shows impaired relaxation pattern of left   ventricular myocardial filling (Grade I diastolic dysfunction).   5. Normal right atrial size.   6. Trivial pericardial effusion.   7. Mild mitral annular calcification.   8. Mild mitral valve regurgitation.   9. Mild thickening of the anterior and posterior mitral valve leaflets.  10. Mild aortic valve stenosis.  11. Peak transaortic gradient equals 27.0 mmHg, mean transaortic gradient   equals 10.9 mmHg, the calculated aortic valve area equals 1.34 cm² by the   continuity equation consistent with moderate aortic stenosis.    PHYSICIAN INTERPRETATION:  Left Ventricle: Left ventricular wall thickness is mildly increased. Left   ventricular ejection fraction, by visual estimation, is 55 to 60%.   Spectral Doppler shows impaired relaxation pattern of left ventricular   myocardial filling (Grade I diastolic dysfunction).  Right Ventricle: RV systolic function is normal.  Left Atrium: Normal left atrial size.  Right Atrium: Normal right atrial size.  Pericardium: Trivial pericardial effusion is present.  Mitral Valve: Mild thickening of the anterior and posterior mitral valve   leaflets. There is mild mitral annular calcification. Mild mitral valve   regurgitation is seen.  Tricuspid Valve: Structurally normal tricuspid valve, with normal leaflet   excursion.  Aortic Valve: The aortic valve is trileaflet. Mild aortic stenosis is   present. Peak transaortic gradient equals 27.0 mmHg, mean transaortic   gradient equals 10.9 mmHg, the calculated aortic valve area equals 1.34   cm² by the continuity equation consistent with moderate aortic stenosis.   No evidence of aortic valve regurgitation is seen.  Pulmonic Valve: The pulmonic valve was not well visualized. The pulmonic   valve is thickened with good excursion.No indication of pulmonic valve   regurgitation.  Pulmonary Artery: Normal pulmonary artery pressure.  Venous: The inferior vena cava is normal.       2D AND M-MODE MEASUREMENTS (normal ranges within parentheses):  Left Ventricle:                    Normal   Aorta/Left Atrium:              Normal  IVSd (2D):               1.24 cm  (0.7-1.1) LA Volume Index    23.2 ml/m²  LVPWd (2D):              1.14 cm  (0.7-1.1)  LVIDd (2D):              4.09 cm  (3.4-5.7)  LVIDs (2D):              2.86 cm  LV FS (2D):              30.0 %    (>25%)  IVSd (Mmode):            1.06 cm  (0.7-1.1)  LVPWd (Mmode):           0.88 cm  (0.7-1.1)  LVIDd (Mmode):           4.74 cm  (3.4-5.7)  LVIDs (Mmode):           3.30 cm  LV FS (Mmode):           30.3 %    (>25%)  Relative Wall Thickness   0.56     (<0.42)  Rel. Wall Thickness Mm    0.37     (<0.42)  LV Mass Index: Mmode    99.6 g/m²    SPECTRAL DOPPLER ANALYSIS:  LV DIASTOLIC FUNCTION:  MV Peak E: 0.91 m/s Decel Time: 167 msec  MV Peak A: 0.96 m/s  E/A Ratio:0.95    Aortic Valve:  AoV VMax:    2.60 m/s  AoV Area, Vmax: 1.11 cm² Vmax Indx: 0.69 cm²/m²  AoV VTI:     0.49 m    AoV Area, VTI:  1.34 cm² VTI Indx:  0.83 cm²/m²  AoV Pk Grad: 27.0 mmHg  AoV Mn Grad: 10.9 mmHg    LVOT Vmax: 0.97 m/s  LVOT VTI:  0.22 m  LVOT Diam: 1.94 cm    Mitral Valve:  MV P1/2 Time: 48.43 msec  MV Area, PHT: 4.54 cm²    Tricuspid Valve and PA/RV Systolic Pressure: TR Max Velocity: 2.22 m/s RA   Pressure: 3 mmHg RVSP/PASP: 22.8 mmHg       [id number] Spring Arrington M.D., Electronically signed on 8/2/2019 at 1:43:24   PM              *** Final ***                    SPRING ARRINGTON MD  This document has been electronically signed. Aug  2 2019  9:11AM             (08-02-19 @ 09:11)  12 Lead ECG:   Ventricular Rate 70 BPM    Atrial Rate 70 BPM    P-R Interval 170 ms    QRS Duration 128 ms    Q-T Interval 428 ms    QTC Calculation(Bezet) 462 ms    P Axis 43 degrees    R Axis -49 degrees    T Axis 46 degrees    Diagnosis Line Normal sinus rhythm  Left bundle branch block  Abnormal ECG    Confirmed by Joao Parnell ([id number]) on 8/1/2019 10:02:34 PM (08-01-19 @ 19:35)      MEDICATIONS  amLODIPine   Tablet 5  ampicillin/sulbactam  IVPB 3  aspirin  chewable 81  atorvastatin 20  chlorhexidine 4% Liquid 1  dextrose 5%. 1000  dextrose 50% Injectable 12.5  dextrose 50% Injectable 25  dextrose 50% Injectable 25  fluconAZOLE IVPB 400  heparin  Injectable 5000  insulin glargine Injectable (LANTUS) 15  insulin lispro (HumaLOG) corrective regimen sliding scale   insulin lispro Injectable (HumaLOG) 7  losartan 50  magnesium sulfate  IVPB 2      ANTIBIOTICS:  ampicillin/sulbactam  IVPB 3 Gram(s) IV Intermittent every 6 hours  fluconAZOLE IVPB 400 milliGRAM(s) IV Intermittent every 24 hours      All available historical records have been reviewed

## 2019-08-06 NOTE — PROGRESS NOTE ADULT - SUBJECTIVE AND OBJECTIVE BOX
GUS WILBURN  68y Female   [id number]    Hospital Day: 6  Post Operative Day:3  Procedure: bedside right groin i and d  intramedullar nailing of femur   Patient is a 68y old  Female who presents with a chief complaint of mechanical fall (05 Aug 2019 16:35)    PAST MEDICAL & SURGICAL HISTORY:  No pertinent past medical history      Events of the Last 24h:none  Vital Signs Last 24 Hrs  T(C): 37.6 (05 Aug 2019 23:00), Max: 37.6 (05 Aug 2019 23:00)  T(F): 99.6 (05 Aug 2019 23:00), Max: 99.6 (05 Aug 2019 23:00)  HR: 82 (05 Aug 2019 23:00) (80 - 85)  BP: 156/64 (05 Aug 2019 23:00) (156/64 - 171/79)  BP(mean): --  RR: 18 (05 Aug 2019 23:00) (16 - 18)  SpO2: --        Diet, Consistent Carbohydrate/No Snacks (08-03-19 @ 09:40)      I&O's Summary    04 Aug 2019 07:01  -  05 Aug 2019 07:00  --------------------------------------------------------  IN: 690 mL / OUT: 1100 mL / NET: -410 mL    05 Aug 2019 07:01  -  06 Aug 2019 03:14  --------------------------------------------------------  IN: 450 mL / OUT: 1700 mL / NET: -1250 mL     I&O's Detail    04 Aug 2019 07:01  -  05 Aug 2019 07:00  --------------------------------------------------------  IN:    Oral Fluid: 690 mL  Total IN: 690 mL    OUT:    Voided: 1100 mL  Total OUT: 1100 mL    Total NET: -410 mL      05 Aug 2019 07:01  -  06 Aug 2019 03:14  --------------------------------------------------------  IN:    Oral Fluid: 450 mL  Total IN: 450 mL    OUT:    Voided: 1700 mL  Total OUT: 1700 mL    Total NET: -1250 mL          MEDICATIONS  (STANDING):  amLODIPine   Tablet 5 milliGRAM(s) Oral daily  ampicillin/sulbactam  IVPB 3 Gram(s) IV Intermittent every 6 hours  aspirin  chewable 81 milliGRAM(s) Oral daily  atorvastatin 20 milliGRAM(s) Oral at bedtime  chlorhexidine 4% Liquid 1 Application(s) Topical <User Schedule>  dextrose 5%. 1000 milliLiter(s) (50 mL/Hr) IV Continuous <Continuous>  dextrose 50% Injectable 12.5 Gram(s) IV Push once  dextrose 50% Injectable 25 Gram(s) IV Push once  dextrose 50% Injectable 25 Gram(s) IV Push once  fluconAZOLE IVPB 400 milliGRAM(s) IV Intermittent every 24 hours  heparin  Injectable 5000 Unit(s) SubCutaneous every 12 hours  insulin glargine Injectable (LANTUS) 15 Unit(s) SubCutaneous at bedtime  insulin lispro (HumaLOG) corrective regimen sliding scale   SubCutaneous three times a day before meals  insulin lispro Injectable (HumaLOG) 5 Unit(s) SubCutaneous three times a day before meals  losartan 50 milliGRAM(s) Oral daily    MEDICATIONS  (PRN):  acetaminophen   Tablet .. 650 milliGRAM(s) Oral every 6 hours PRN Mild Pain (1 - 3)  ALPRAZolam 0.25 milliGRAM(s) Oral three times a day PRN anxiety  dextrose 40% Gel 15 Gram(s) Oral once PRN Blood Glucose LESS THAN 70 milliGRAM(s)/deciliter  glucagon  Injectable 1 milliGRAM(s) IntraMuscular once PRN Glucose LESS THAN 70 milligrams/deciliter  morphine  - Injectable 2 milliGRAM(s) IV Push every 6 hours PRN Severe Pain (7 - 10)  traMADol 50 milliGRAM(s) Oral every 4 hours PRN Moderate Pain (4 - 6)  zolpidem 5 milliGRAM(s) Oral at bedtime PRN Insomnia      PHYSICAL EXAM:    GENERAL: NAD    HEENT: NCAT    CHEST/LUNGS: CTAB    HEART: RRR,  No murmurs, rubs, or gallops    ABDOMEN: SNTND +BS    EXTREMITIES:  FROM, No clubbing, cyanosis, or edema, palpable pulse    NEURO: No focal neurological deficits    SKIN: No rashes or lesions    INCISION/WOUNDS:                          8.5    6.11  )-----------( 275      ( 05 Aug 2019 06:08 )             26.9        CBC Full  -  ( 05 Aug 2019 06:08 )  WBC Count : 6.11 K/uL  RBC Count : 3.19 M/uL  Hemoglobin : 8.5 g/dL  Hematocrit : 26.9 %  Platelet Count - Automated : 275 K/uL  Mean Cell Volume : 84.3 fL  Mean Cell Hemoglobin : 26.6 pg  Mean Cell Hemoglobin Concentration : 31.6 g/dL  Auto Neutrophil # : x  Auto Lymphocyte # : x  Auto Monocyte # : x  Auto Eosinophil # : x  Auto Basophil # : x  Auto Neutrophil % : x  Auto Lymphocyte % : x  Auto Monocyte % : x  Auto Eosinophil % : x  Auto Basophil % : x               143   |  106   |  10                 Ca: 9.4    BMP:   ----------------------------< 181    Mg: x     (08-05-19 @ 06:08)             4.2    |  24    | 0.6                Ph: x        LFT:     TPro: 6.2 / Alb: 3.1 / TBili: 0.2 / DBili: <0.2 / AST: 8 / ALT: 5 / AlkPhos: 88   (08-02-19 @ 05:59)

## 2019-08-06 NOTE — PROGRESS NOTE ADULT - ASSESSMENT
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  The patient was also evaluated and treated for a right labial abscess likely related to uncontrolled diabetes.    Fall complicated by left hip fracture: status post ORIF. Continue Tramadol PRN for pain.  Post-operative acute blood loss anemia worsened to 7.9; repeat CBC in AM.  Orthopedics follow-up appreciated; WBAT to the LLE.  Medically stable for discharge to   Right Labial Abscess: status post incision and drainage.  Wound cultures were positive for Candida Albicans.  Continue wound care with half-inch iodoform packing, then place 4x4 gauze on top of the wound, then use a Tegaderm on top to secure the dressing.  ID recommended switching to PO Augmentin 875mg PO q12 and Diflucan 400mg PO q24 upon discharge.  Repeat LFTs in AM  Viral cardiomyopathy/moderate AS: repeat echocardiogram demonstrates normal ejection fraction with moderate AS.  Follow-up with cardiology as outpatient.  Uncontrolled DMII: continue Lantus at present dose.  Increase Lispro to 7u with meals  Hypertension: continue Losartan 50mg q24  GI/DVT prophylaxis    #Progress Note Handoff    Pending:    Future Disposition: Medically stable for discharge, pending placement in

## 2019-08-06 NOTE — PROGRESS NOTE ADULT - SUBJECTIVE AND OBJECTIVE BOX
Seen and examined at bedside. Pain controlled. Doing well with PT, likely to 4A.       Phys Ex  NAD, alert and oriented    LLE  Dressing c/d/i. Changed.  SILT s/s/sp/dp/t  +EHL/FHL  toes wwp    A/P: 68F s/p L IMN for IT fx    pain control  WBAT  Rehab/PT  incentive spirometer  DVT ppx  dispo planning

## 2019-08-06 NOTE — PROGRESS NOTE ADULT - ASSESSMENT
ASSESSMENT  67 yo female with hx of Viral myopathy, DM2, and non-compliance  presenting with L hip fracture and R groin abscess (took amox at home)    IMPRESSION  #R groin abscess    s/p I&D 8/2, cx with Moderate Presumptive Candida albicans (did take po amox prior) with presently no ongoing abscess or cellulitis    Sepsis ruled out on admission   #Left hip fracture, s/p OR  #Hyponatremia  #BCX NG    RECOMMENDATIONS  -  fluc 400mg IV daily, trend LFTS  - Cont Unasyn 3g q6h  - Can change to PO abx end 8/10,  PO agumentin 875mg BID and fluc 400mg PO daily

## 2019-08-07 LAB
ALBUMIN SERPL ELPH-MCNC: 3 G/DL — LOW (ref 3.5–5.2)
ALP SERPL-CCNC: 105 U/L — SIGNIFICANT CHANGE UP (ref 30–115)
ALT FLD-CCNC: 13 U/L — SIGNIFICANT CHANGE UP (ref 0–41)
ANION GAP SERPL CALC-SCNC: 12 MMOL/L — SIGNIFICANT CHANGE UP (ref 7–14)
AST SERPL-CCNC: 24 U/L — SIGNIFICANT CHANGE UP (ref 0–41)
BILIRUB SERPL-MCNC: 0.3 MG/DL — SIGNIFICANT CHANGE UP (ref 0.2–1.2)
BUN SERPL-MCNC: 12 MG/DL — SIGNIFICANT CHANGE UP (ref 10–20)
CALCIUM SERPL-MCNC: 9.3 MG/DL — SIGNIFICANT CHANGE UP (ref 8.5–10.1)
CHLORIDE SERPL-SCNC: 105 MMOL/L — SIGNIFICANT CHANGE UP (ref 98–110)
CO2 SERPL-SCNC: 26 MMOL/L — SIGNIFICANT CHANGE UP (ref 17–32)
CREAT SERPL-MCNC: 0.7 MG/DL — SIGNIFICANT CHANGE UP (ref 0.7–1.5)
CULTURE RESULTS: SIGNIFICANT CHANGE UP
GLUCOSE BLDC GLUCOMTR-MCNC: 105 MG/DL — HIGH (ref 70–99)
GLUCOSE BLDC GLUCOMTR-MCNC: 116 MG/DL — HIGH (ref 70–99)
GLUCOSE BLDC GLUCOMTR-MCNC: 126 MG/DL — HIGH (ref 70–99)
GLUCOSE BLDC GLUCOMTR-MCNC: 129 MG/DL — HIGH (ref 70–99)
GLUCOSE SERPL-MCNC: 138 MG/DL — HIGH (ref 70–99)
HCT VFR BLD CALC: 26.2 % — LOW (ref 37–47)
HGB BLD-MCNC: 8.2 G/DL — LOW (ref 12–16)
MAGNESIUM SERPL-MCNC: 2 MG/DL — SIGNIFICANT CHANGE UP (ref 1.8–2.4)
MCHC RBC-ENTMCNC: 26.6 PG — LOW (ref 27–31)
MCHC RBC-ENTMCNC: 31.3 G/DL — LOW (ref 32–37)
MCV RBC AUTO: 85.1 FL — SIGNIFICANT CHANGE UP (ref 81–99)
NRBC # BLD: 0 /100 WBCS — SIGNIFICANT CHANGE UP (ref 0–0)
PLATELET # BLD AUTO: 290 K/UL — SIGNIFICANT CHANGE UP (ref 130–400)
POTASSIUM SERPL-MCNC: 4.2 MMOL/L — SIGNIFICANT CHANGE UP (ref 3.5–5)
POTASSIUM SERPL-SCNC: 4.2 MMOL/L — SIGNIFICANT CHANGE UP (ref 3.5–5)
PROT SERPL-MCNC: 5.9 G/DL — LOW (ref 6–8)
RBC # BLD: 3.08 M/UL — LOW (ref 4.2–5.4)
RBC # FLD: 13.2 % — SIGNIFICANT CHANGE UP (ref 11.5–14.5)
SODIUM SERPL-SCNC: 143 MMOL/L — SIGNIFICANT CHANGE UP (ref 135–146)
SPECIMEN SOURCE: SIGNIFICANT CHANGE UP
WBC # BLD: 5.64 K/UL — SIGNIFICANT CHANGE UP (ref 4.8–10.8)
WBC # FLD AUTO: 5.64 K/UL — SIGNIFICANT CHANGE UP (ref 4.8–10.8)

## 2019-08-07 PROCEDURE — 99231 SBSQ HOSP IP/OBS SF/LOW 25: CPT

## 2019-08-07 RX ADMIN — AMPICILLIN SODIUM AND SULBACTAM SODIUM 200 GRAM(S): 250; 125 INJECTION, POWDER, FOR SUSPENSION INTRAMUSCULAR; INTRAVENOUS at 06:35

## 2019-08-07 RX ADMIN — FLUCONAZOLE 100 MILLIGRAM(S): 150 TABLET ORAL at 01:39

## 2019-08-07 RX ADMIN — AMPICILLIN SODIUM AND SULBACTAM SODIUM 200 GRAM(S): 250; 125 INJECTION, POWDER, FOR SUSPENSION INTRAMUSCULAR; INTRAVENOUS at 17:43

## 2019-08-07 RX ADMIN — HEPARIN SODIUM 5000 UNIT(S): 5000 INJECTION INTRAVENOUS; SUBCUTANEOUS at 06:34

## 2019-08-07 RX ADMIN — AMPICILLIN SODIUM AND SULBACTAM SODIUM 200 GRAM(S): 250; 125 INJECTION, POWDER, FOR SUSPENSION INTRAMUSCULAR; INTRAVENOUS at 12:28

## 2019-08-07 RX ADMIN — TRAMADOL HYDROCHLORIDE 50 MILLIGRAM(S): 50 TABLET ORAL at 22:52

## 2019-08-07 RX ADMIN — TRAMADOL HYDROCHLORIDE 50 MILLIGRAM(S): 50 TABLET ORAL at 22:22

## 2019-08-07 RX ADMIN — Medication 100 MILLIGRAM(S): at 12:27

## 2019-08-07 RX ADMIN — Medication 81 MILLIGRAM(S): at 12:27

## 2019-08-07 RX ADMIN — TRAMADOL HYDROCHLORIDE 50 MILLIGRAM(S): 50 TABLET ORAL at 13:20

## 2019-08-07 RX ADMIN — MORPHINE SULFATE 2 MILLIGRAM(S): 50 CAPSULE, EXTENDED RELEASE ORAL at 04:30

## 2019-08-07 RX ADMIN — Medication 650 MILLIGRAM(S): at 13:16

## 2019-08-07 RX ADMIN — ATORVASTATIN CALCIUM 20 MILLIGRAM(S): 80 TABLET, FILM COATED ORAL at 22:22

## 2019-08-07 RX ADMIN — LOSARTAN POTASSIUM 50 MILLIGRAM(S): 100 TABLET, FILM COATED ORAL at 06:34

## 2019-08-07 RX ADMIN — INSULIN GLARGINE 15 UNIT(S): 100 INJECTION, SOLUTION SUBCUTANEOUS at 22:21

## 2019-08-07 RX ADMIN — Medication 7 UNIT(S): at 12:27

## 2019-08-07 RX ADMIN — Medication 7 UNIT(S): at 08:15

## 2019-08-07 RX ADMIN — AMLODIPINE BESYLATE 5 MILLIGRAM(S): 2.5 TABLET ORAL at 06:34

## 2019-08-07 RX ADMIN — HEPARIN SODIUM 5000 UNIT(S): 5000 INJECTION INTRAVENOUS; SUBCUTANEOUS at 17:43

## 2019-08-07 RX ADMIN — Medication 650 MILLIGRAM(S): at 12:26

## 2019-08-07 RX ADMIN — MORPHINE SULFATE 2 MILLIGRAM(S): 50 CAPSULE, EXTENDED RELEASE ORAL at 04:04

## 2019-08-07 RX ADMIN — Medication 7 UNIT(S): at 17:42

## 2019-08-07 NOTE — PROGRESS NOTE ADULT - SUBJECTIVE AND OBJECTIVE BOX
Seen and examined at bedside. Pain controlled. Doing well with PT. Awaiting insurance auth for 4A.       Phys Ex  NAD, alert and oriented    LLE  Dressing c/d/i. Changed.  SILT s/s/sp/dp/t  +EHL/FHL  toes wwp    A/P: 68F s/p L IMN for IT fx    pain control  WBAT  Rehab/PT  incentive spirometer  DVT ppx  dispo planning

## 2019-08-07 NOTE — MEDICAL STUDENT PROGRESS NOTE(EDUCATION) - NS MD HP STUD ASPLAN ASSES FT
Patient is a 69 yo  F with PMH of DMt2, viral cardiomyopathy with moderate AS, HTN and HLD being assessed for the R inguinal abscess.

## 2019-08-07 NOTE — MEDICAL STUDENT PROGRESS NOTE(EDUCATION) - NS MD HP STUD ASPLAN PLAN FT
#. Intertrochantric fracture of the femur s/p IMN of the femur   - mechanical fall  - CBC stable, f/u cbc  - daily dressing changes  - rehab with OT 2-3 times/week  - for moderate pain: control with tramadol 50 mg po q4h prn and for severe pain: control with morphine 2mg IV q6h prn   - full weight bearing, AAT  - pending workers comp for 4A placement    #. R inguinal abscess   - IV fluconazole  - IV unsyn 3 g q6h  - s/p i&d  - culture grew candida  - Id following     #. Hyperglycemia/DMt2  - continue Lantus at 15 units, increase lispro to 7units  - continue bg finger sticks qAC    #. HTN  - continue losartan 50 mg po daily  - added amlodipine 5    #. HLD  - continue atorvastatin 20 mg po qhs    #. viral cardiomyopathy  - follow up OP    DVT Prophylaxis  [x] Subq Heparin [ ] LMWH [ ] Coumadin [ ] Xarelto [ ] Eliquis [ ] Venodyne pumps    GI Prophylaxis  Not indicated    Diet  Consistent Carb    Activity  OOB; ambulates with walker/assistance; fall precautions; followed by PT/PMR    Advanced Directives  [x] Full Code [ ] DNR [ ] DNI    Palliative Care  Not discussed    Disposition  Discharge to 4A after Worker's Comp    Care Discussed with Consultants/Other Providers [x] YES  [ ] NO    [x] Discussed with Patient [ ] Family [ ]  [ ] RN [ ]     [  ] Teaching Attending Addendum [x] Present with Resident      Time spent  30 minutes    I saw and evaluated the patient under the supervision of my assigned Intern. Discussed with Resident, Dr. Akers and agree with the resident's findings and plan as documented in the resident's note, with the following revision made as necessary. #. Intertrochantric fracture of the femur s/p IMN of the femur   - mechanical fall  - CBC stable, f/u cbc  - daily dressing changes  - rehab with OT 2-3 times/week  - for moderate pain: control with tramadol 50 mg po q4h prn and for severe pain: control with morphine 2mg IV q6h prn   - full weight bearing, AAT  - pending workers comp for 4A placement    #. R inguinal abscess   - IV fluconazole  - IV unsyn 3 g q6h  - s/p i&d  - culture grew candida  - Id following     #. Hyperglycemia/DMt2  - continue Lantus at 15 units, continue lispro to 7units as FSBG levels are decreased, so patient is stable on this regimen  - continue bg finger sticks qAC    #. HTN  - continue losartan 50 mg po daily  - added amlodipine 5    #. HLD  - continue atorvastatin 20 mg po qhs    #. viral cardiomyopathy  - follow up OP    DVT Prophylaxis  [x] Subq Heparin [ ] LMWH [ ] Coumadin [ ] Xarelto [ ] Eliquis [ ] Venodyne pumps    GI Prophylaxis  Not indicated    Diet  Consistent Carb    Activity  OOB; ambulates with walker/assistance; fall precautions; followed by PT/PMR    Advanced Directives  [x] Full Code [ ] DNR [ ] DNI    Palliative Care  Not discussed    Disposition  Discharge to 4A after Worker's Comp    Care Discussed with Consultants/Other Providers [x] YES  [ ] NO    [x] Discussed with Patient [ ] Family [ ]  [ ] RN [ ]     [  ] Teaching Attending Addendum [x] Present with Resident      Time spent  30 minutes    I saw and evaluated the patient under the supervision of my assigned Intern. Discussed with Resident, Dr. Akers and agree with the resident's findings and plan as documented in the resident's note, with the following revision made as necessary.

## 2019-08-07 NOTE — MEDICAL STUDENT PROGRESS NOTE(EDUCATION) - SUBJECTIVE AND OBJECTIVE BOX
Description  Patient is a 67 yo  F with PMH of DMt2, viral cardiomyopathy with moderate AS, HTN and HLD who presented on 8/1 for a intertrochanteric fracture of the L hip secondary to a fall s/p ORIF and intramedullary nailing of the femur. She also has a R groin abscess.    Interval HPI  Patient has adequate pain control, however, patient has increased blood glucose levels on finger sticks which she attributes to being in pain.      Hospital Day #6 POD #4    Overnight events  No acute events overnight.    Review of Systems  CONSTITUTIONAL: No fever  RESPIRATORY: No cough, wheezing, chills or hemoptysis; No shortness of breath  CARDIOVASCULAR: No chest pain, palpitations, dizziness, or leg swelling  GASTROINTESTINAL: No abdominal or epigastric pain. No nausea, vomiting, or hematemesis; No diarrhea or constipation. No melena or hematochezia.  GENITOURINARY: No dysuria, frequency, hematuria, or incontinence  NEUROLOGICAL: No headaches, loss of strength, numbness, or tremors  SKIN: + R groin abscess with some discharge No itching, burning, rashes   LYMPH NODES: No enlarged glands  MUSCULOSKELETAL: + L hip pain but no  swelling; No muscle, back, or extremity pain  HEME/LYMPH: no bleeding    Medications  MEDICATIONS  (STANDING):  amLODIPine   Tablet 5 milliGRAM(s) Oral daily  ampicillin/sulbactam  IVPB 3 Gram(s) IV Intermittent every 6 hours  aspirin  chewable 81 milliGRAM(s) Oral daily  atorvastatin 20 milliGRAM(s) Oral at bedtime  chlorhexidine 4% Liquid 1 Application(s) Topical <User Schedule>  dextrose 5%. 1000 milliLiter(s) (50 mL/Hr) IV Continuous <Continuous>  dextrose 50% Injectable 12.5 Gram(s) IV Push once  dextrose 50% Injectable 25 Gram(s) IV Push once  dextrose 50% Injectable 25 Gram(s) IV Push once  docusate sodium 100 milliGRAM(s) Oral daily  fluconAZOLE IVPB 400 milliGRAM(s) IV Intermittent every 24 hours  heparin  Injectable 5000 Unit(s) SubCutaneous every 12 hours  insulin glargine Injectable (LANTUS) 15 Unit(s) SubCutaneous at bedtime  insulin lispro (HumaLOG) corrective regimen sliding scale   SubCutaneous three times a day before meals  insulin lispro Injectable (HumaLOG) 7 Unit(s) SubCutaneous three times a day before meals  losartan 50 milliGRAM(s) Oral daily    MEDICATIONS  (PRN):  acetaminophen   Tablet .. 650 milliGRAM(s) Oral every 6 hours PRN Mild Pain (1 - 3)  ALPRAZolam 0.25 milliGRAM(s) Oral three times a day PRN anxiety  dextrose 40% Gel 15 Gram(s) Oral once PRN Blood Glucose LESS THAN 70 milliGRAM(s)/deciliter  glucagon  Injectable 1 milliGRAM(s) IntraMuscular once PRN Glucose LESS THAN 70 milligrams/deciliter  morphine  - Injectable 2 milliGRAM(s) IV Push every 6 hours PRN Severe Pain (7 - 10)  traMADol 50 milliGRAM(s) Oral every 4 hours PRN Moderate Pain (4 - 6)  zolpidem 5 milliGRAM(s) Oral at bedtime PRN Insomnia    OBJECTIVE    Vital Signs  T(C): 37.4 (08-07-19 @ 07:34), Max: 37.4 (08-07-19 @ 07:34)  HR: 78 (08-07-19 @ 07:34) (76 - 83)  BP: 137/67 (08-07-19 @ 07:34) (137/67 - 152/68)  RR: 18 (08-07-19 @ 07:34) (18 - 18)  SpO2: --  Wt(kg): --  I&O's Summary  06 Aug 2019 07:01  -  07 Aug 2019 07:00  --------------------------------------------------------  IN: 960 mL / OUT: 700 mL / NET: 260 mL    07 Aug 2019 07:01  -  07 Aug 2019 13:13  --------------------------------------------------------  IN: 960 mL / OUT: 500 mL / NET: 460 mL    Labs    Radiology/other tests    Consultant(s) Notes Reviewed    Physical Exam  GENERAL: NAD, well-groomed, well-developed  HEAD:  Atraumatic, Normocephalic  EYES: EOMI, PERRLA, conjunctiva and sclera clear  ENMT: No tonsillar erythema, exudates, or enlargement; Moist mucous membranes, Good dentition, No lesions  NECK: Supple, No JVD, Normal thyroid  NERVOUS SYSTEM:  Alert & Oriented X3, Good concentration; Motor Strength 5/5 B/L upper and lower extremities; DTRs 2+ intact and symmetric  CHEST/LUNG: Clear to percussion bilaterally; No rales, rhonchi, wheezing, or rubs  HEART: Regular rate and rhythm; No murmurs, rubs, or gallops  ABDOMEN: Soft, Nontender, Nondistended; Bowel sounds present  EXTREMITIES:  2+ Peripheral Pulses, No clubbing, cyanosis, or edema  LYMPH: No lymphadenopathy noted  SKIN: No rashes or lesions    ---  SUBJECTIVE    Description  Patient is a ____ yo...    Interval HPI    HD # POD #    Overnight events        OBJECTIVE    Physical Exam        Labs    Diet    Radiology and other tests    Consultant(s) Notes Reviewed          ASSESSMENT  Patient is a _____ yo...    PLAN    Disposition    DVT Prophylaxis  [ ] Subcu Heparin [  ]  LMWH [ ]  Coumadin [ ] Xaeralto [ ] Eliquis [ ] Venodyne pumps    [ ] Discussed with Patient [ ] Family [ ]  [ ] RN [ ]     Advance Directives  [ ] Full Code [ ] DNR [ ] DNI    Palliative Care:    Care Discussed with Consultants/Other Providers [x ] YES  [ ] NO    [  ] Teaching Attending Addendum [x] Present with Resident      I saw and evaluated the patient. Discussed with Resident,  _____________________ and agree with the resident's findings and plan as documented in the resident's note, with the following revision made as necessary.     Time spent Description  Patient is a 69 yo  F with PMH of DMt2, viral cardiomyopathy with moderate AS, HTN and HLD who presented on 8/1 for a intertrochanteric fracture of the L hip secondary to a fall s/p ORIF and intramedullary nailing of the femur and found to also have a R groin abscess.    Interval HPI  Patient has adequate pain control. No complaints at this time.       Hospital Day #6 POD #4    Overnight events  No acute events overnight.    Review of Systems  CONSTITUTIONAL: No fever  RESPIRATORY: No cough, wheezing, chills or hemoptysis; No shortness of breath  CARDIOVASCULAR: No chest pain, palpitations, dizziness, or leg swelling  GASTROINTESTINAL: No abdominal or epigastric pain. No nausea, vomiting, or hematemesis; No diarrhea or constipation. No melena or hematochezia.  GENITOURINARY: No dysuria, frequency, hematuria, or incontinence  NEUROLOGICAL: No headaches, loss of strength, numbness, or tremors  SKIN: + R groin abscess with some discharge No itching, burning, rashes   LYMPH NODES: No enlarged glands  MUSCULOSKELETAL: + L hip pain but no  swelling; No muscle, back, or extremity pain  HEME/LYMPH: no bleeding    Medications  MEDICATIONS  (STANDING):  amLODIPine   Tablet 5 milliGRAM(s) Oral daily  ampicillin/sulbactam  IVPB 3 Gram(s) IV Intermittent every 6 hours  aspirin  chewable 81 milliGRAM(s) Oral daily  atorvastatin 20 milliGRAM(s) Oral at bedtime  chlorhexidine 4% Liquid 1 Application(s) Topical <User Schedule>  dextrose 5%. 1000 milliLiter(s) (50 mL/Hr) IV Continuous <Continuous>  dextrose 50% Injectable 12.5 Gram(s) IV Push once  dextrose 50% Injectable 25 Gram(s) IV Push once  dextrose 50% Injectable 25 Gram(s) IV Push once  docusate sodium 100 milliGRAM(s) Oral daily  fluconAZOLE IVPB 400 milliGRAM(s) IV Intermittent every 24 hours  heparin  Injectable 5000 Unit(s) SubCutaneous every 12 hours  insulin glargine Injectable (LANTUS) 15 Unit(s) SubCutaneous at bedtime  insulin lispro (HumaLOG) corrective regimen sliding scale   SubCutaneous three times a day before meals  insulin lispro Injectable (HumaLOG) 7 Unit(s) SubCutaneous three times a day before meals  losartan 50 milliGRAM(s) Oral daily    MEDICATIONS  (PRN):  acetaminophen   Tablet .. 650 milliGRAM(s) Oral every 6 hours PRN Mild Pain (1 - 3)  ALPRAZolam 0.25 milliGRAM(s) Oral three times a day PRN anxiety  dextrose 40% Gel 15 Gram(s) Oral once PRN Blood Glucose LESS THAN 70 milliGRAM(s)/deciliter  glucagon  Injectable 1 milliGRAM(s) IntraMuscular once PRN Glucose LESS THAN 70 milligrams/deciliter  morphine  - Injectable 2 milliGRAM(s) IV Push every 6 hours PRN Severe Pain (7 - 10)  traMADol 50 milliGRAM(s) Oral every 4 hours PRN Moderate Pain (4 - 6)  zolpidem 5 milliGRAM(s) Oral at bedtime PRN Insomnia    OBJECTIVE    Vital Signs  T(C): 37.4 (08-07-19 @ 07:34), Max: 37.4 (08-07-19 @ 07:34)  HR: 78 (08-07-19 @ 07:34) (76 - 83)  BP: 137/67 (08-07-19 @ 07:34) (137/67 - 152/68)  RR: 18 (08-07-19 @ 07:34) (18 - 18)  SpO2: --  Wt(kg): --  I&O's Summary  06 Aug 2019 07:01  -  07 Aug 2019 07:00  --------------------------------------------------------  IN: 960 mL / OUT: 700 mL / NET: 260 mL    07 Aug 2019 07:01  -  07 Aug 2019 13:13  --------------------------------------------------------  IN: 960 mL / OUT: 500 mL / NET: 460 mL    Labs  Complete Blood Count in AM (08.07.19 @ 07:12)    Nucleated RBC: 0 /100 WBCs    WBC Count: 5.64 K/uL    RBC Count: 3.08 M/uL    Hemoglobin: 8.2 g/dL    Hematocrit: 26.2 %    Mean Cell Volume: 85.1 fL    Mean Cell Hemoglobin: 26.6 pg    Mean Cell Hemoglobin Conc: 31.3 g/dL    Red Cell Distrib Width: 13.2 %    Platelet Count - Automated: 290 K/uL  Comprehensive Metabolic Panel in AM (08.07.19 @ 07:12)    Sodium, Serum: 143 mmol/L    Potassium, Serum: 4.2 mmol/L    Chloride, Serum: 105 mmol/L    Carbon Dioxide, Serum: 26 mmol/L    Anion Gap, Serum: 12 mmol/L    Blood Urea Nitrogen, Serum: 12 mg/dL    Creatinine, Serum: 0.7 mg/dL    Glucose, Serum: 138 mg/dL    Calcium, Total Serum: 9.3 mg/dL    Protein Total, Serum: 5.9 g/dL    Albumin, Serum: 3.0 g/dL    Bilirubin Total, Serum: 0.3 mg/dL    Alkaline Phosphatase, Serum: 105 U/L    Aspartate Aminotransferase (AST/SGOT): 24 U/L    Alanine Aminotransferase (ALT/SGPT): 13 U/L    eGFR if Non : 90: Interpretative comment  The units for eGFR are mL/min/1.73M2 (normalized body surface area). The  eGFR is calculated from a serum creatinine using the CKD-EPI equation.  Other variables required for calculation are race, age and sex. Among  patients with chronic kidney disease (CKD), the eGFR is useful in  determining the stage of disease according to KDOQI CKD classification.  All eGFR results are reported numerically with the following  interpretation.          GFR                    With                 Without     (ml/min/1.73 m2)    Kidney Damage       Kidney Damage        >= 90                    Stage 1                     Normal        60-89                    Stage 2                     Decreased GFR        30-59     Stage 3                     Stage 3        15-29                    Stage 4                     Stage 4        < 15                      Stage 5                     Stage 5  Each stage of CKD assumes that the associated GFR level has been in  effect for at least 3 months. Determination of stages one and two (with  eGFR > 59 ml/min/m2) requires estimation of kidney damage for at least 3  months as defined by structural or functional abnormalities.  Limitations: All estimates of GFR will be less accurate for patients at  extremes of muscle mass (including but not limited to frail elderly,  critically ill, or cancer patients), those with unusual diets, and those  with conditions associated with reduced secretion or extrarenal  elimination of creatinine. The eGFR equation is not recommended for use  in patients with unstable creatinine levels. mL/min/1.73M2    eGFR if African American: 105 mL/min/1.73M2    Radiology/other tests  No recent    Consultant(s) Notes Reviewed  Surgery: daily packing and dressing changes. call surgery prn  Ortho: PC with meds prn, WBAT, rehab/PT, IS, DVT ppx, d/c planning    Physical Exam  GENERAL: NAD  NERVOUS SYSTEM:  a&o, Motor Strength 5/5 LLE; DTRs 2+ intact and symmetric LLE  CHEST/LUNG: CTABL; No rales, rhonchi, wheezing, or rubs  HEART: RRR; + systolic murmur best appreciated in the LLSB, but no rubs or gallops  ABDOMEN: Soft, NT, ND; BS present  EXTREMITIES:  2+ Peripheral Pulses, No clubbing, cyanosis, or edema  LYMPH: No lymphadenopathy noted  SKIN: + R inguinal abscess with minimal to no drainage, +L hip tenderness but surgical site is clean and intact, no rashes Description  Patient is a 67 yo  F with PMH of DMt2, viral cardiomyopathy with moderate AS, HTN and HLD who presented on 8/1 for a intertrochanteric fracture of the L hip secondary to a fall s/p ORIF and intramedullary nailing of the femur and found to also have a R groin abscess.    Interval HPI  Patient has adequate pain control. No complaints at this time.       Hospital Day #6 POD #4    Overnight events  No acute events overnight.    Review of Systems  CONSTITUTIONAL: No fever  RESPIRATORY: No cough, wheezing, chills or hemoptysis; No shortness of breath  CARDIOVASCULAR: No chest pain, palpitations, dizziness, or leg swelling  GASTROINTESTINAL: No abdominal or epigastric pain. No nausea, vomiting, or hematemesis; No diarrhea or constipation. No melena or hematochezia.  GENITOURINARY: No dysuria, frequency, hematuria, or incontinence  NEUROLOGICAL: No headaches, loss of strength, numbness, or tremors  SKIN: + R groin abscess with some discharge No itching, burning, rashes   LYMPH NODES: No enlarged glands  MUSCULOSKELETAL: + L hip pain but no  swelling; No muscle, back, or extremity pain  HEME/LYMPH: no bleeding    Medications  MEDICATIONS  (STANDING):  amLODIPine   Tablet 5 milliGRAM(s) Oral daily  ampicillin/sulbactam  IVPB 3 Gram(s) IV Intermittent every 6 hours  aspirin  chewable 81 milliGRAM(s) Oral daily  atorvastatin 20 milliGRAM(s) Oral at bedtime  chlorhexidine 4% Liquid 1 Application(s) Topical <User Schedule>  dextrose 5%. 1000 milliLiter(s) (50 mL/Hr) IV Continuous <Continuous>  dextrose 50% Injectable 12.5 Gram(s) IV Push once  dextrose 50% Injectable 25 Gram(s) IV Push once  dextrose 50% Injectable 25 Gram(s) IV Push once  docusate sodium 100 milliGRAM(s) Oral daily  fluconAZOLE IVPB 400 milliGRAM(s) IV Intermittent every 24 hours  heparin  Injectable 5000 Unit(s) SubCutaneous every 12 hours  insulin glargine Injectable (LANTUS) 15 Unit(s) SubCutaneous at bedtime  insulin lispro (HumaLOG) corrective regimen sliding scale   SubCutaneous three times a day before meals  insulin lispro Injectable (HumaLOG) 7 Unit(s) SubCutaneous three times a day before meals  losartan 50 milliGRAM(s) Oral daily    MEDICATIONS  (PRN):  acetaminophen   Tablet .. 650 milliGRAM(s) Oral every 6 hours PRN Mild Pain (1 - 3)  ALPRAZolam 0.25 milliGRAM(s) Oral three times a day PRN anxiety  dextrose 40% Gel 15 Gram(s) Oral once PRN Blood Glucose LESS THAN 70 milliGRAM(s)/deciliter  glucagon  Injectable 1 milliGRAM(s) IntraMuscular once PRN Glucose LESS THAN 70 milligrams/deciliter  morphine  - Injectable 2 milliGRAM(s) IV Push every 6 hours PRN Severe Pain (7 - 10)  traMADol 50 milliGRAM(s) Oral every 4 hours PRN Moderate Pain (4 - 6)  zolpidem 5 milliGRAM(s) Oral at bedtime PRN Insomnia    OBJECTIVE    Vital Signs  T(C): 37.4 (08-07-19 @ 07:34), Max: 37.4 (08-07-19 @ 07:34)  HR: 78 (08-07-19 @ 07:34) (76 - 83)  BP: 137/67 (08-07-19 @ 07:34) (137/67 - 152/68)  RR: 18 (08-07-19 @ 07:34) (18 - 18)  SpO2: --  Wt(kg): --  I&O's Summary  06 Aug 2019 07:01  -  07 Aug 2019 07:00  --------------------------------------------------------  IN: 960 mL / OUT: 700 mL / NET: 260 mL    07 Aug 2019 07:01  -  07 Aug 2019 13:13  --------------------------------------------------------  IN: 960 mL / OUT: 500 mL / NET: 460 mL    Labs  POCT Blood Glucose.: 129: RNNotify RB Clnd mtr mg/dL (08.07.19 @ 07:46)  POCT Blood Glucose.: 213: RNNotify RB Clnd mtr mg/dL (08.06.19 @ 08:03)  POCT Blood Glucose.: 202: RNNotify RB Clnd mtr mg/dL (08.06.19 @ 11:39)  POCT Blood Glucose.: 150 mg/dL (08.06.19 @ 21:41)    Complete Blood Count in AM (08.07.19 @ 07:12)    Nucleated RBC: 0 /100 WBCs    WBC Count: 5.64 K/uL    RBC Count: 3.08 M/uL    Hemoglobin: 8.2 g/dL    Hematocrit: 26.2 %    Mean Cell Volume: 85.1 fL    Mean Cell Hemoglobin: 26.6 pg    Mean Cell Hemoglobin Conc: 31.3 g/dL    Red Cell Distrib Width: 13.2 %    Platelet Count - Automated: 290 K/uL  Comprehensive Metabolic Panel in AM (08.07.19 @ 07:12)    Sodium, Serum: 143 mmol/L    Potassium, Serum: 4.2 mmol/L    Chloride, Serum: 105 mmol/L    Carbon Dioxide, Serum: 26 mmol/L    Anion Gap, Serum: 12 mmol/L    Blood Urea Nitrogen, Serum: 12 mg/dL    Creatinine, Serum: 0.7 mg/dL    Glucose, Serum: 138 mg/dL    Calcium, Total Serum: 9.3 mg/dL    Protein Total, Serum: 5.9 g/dL    Albumin, Serum: 3.0 g/dL    Bilirubin Total, Serum: 0.3 mg/dL    Alkaline Phosphatase, Serum: 105 U/L    Aspartate Aminotransferase (AST/SGOT): 24 U/L    Alanine Aminotransferase (ALT/SGPT): 13 U/L    eGFR if Non : 90: Interpretative comment  The units for eGFR are mL/min/1.73M2 (normalized body surface area). The  eGFR is calculated from a serum creatinine using the CKD-EPI equation.  Other variables required for calculation are race, age and sex. Among  patients with chronic kidney disease (CKD), the eGFR is useful in  determining the stage of disease according to KDOQI CKD classification.  All eGFR results are reported numerically with the following  interpretation.          GFR                    With                 Without     (ml/min/1.73 m2)    Kidney Damage       Kidney Damage        >= 90                    Stage 1                     Normal        60-89                    Stage 2                     Decreased GFR        30-59     Stage 3                     Stage 3        15-29                    Stage 4                     Stage 4        < 15                      Stage 5                     Stage 5  Each stage of CKD assumes that the associated GFR level has been in  effect for at least 3 months. Determination of stages one and two (with  eGFR > 59 ml/min/m2) requires estimation of kidney damage for at least 3  months as defined by structural or functional abnormalities.  Limitations: All estimates of GFR will be less accurate for patients at  extremes of muscle mass (including but not limited to frail elderly,  critically ill, or cancer patients), those with unusual diets, and those  with conditions associated with reduced secretion or extrarenal  elimination of creatinine. The eGFR equation is not recommended for use  in patients with unstable creatinine levels. mL/min/1.73M2    eGFR if African American: 105 mL/min/1.73M2    Radiology/other tests  No recent    Consultant(s) Notes Reviewed  Surgery: daily packing and dressing changes. call surgery prn  Ortho: PC with meds prn, WBAT, rehab/PT, IS, DVT ppx, d/c planning    Physical Exam  GENERAL: NAD  NERVOUS SYSTEM:  a&o, Motor Strength 5/5 LLE; DTRs 2+ intact and symmetric LLE  CHEST/LUNG: CTABL; No rales, rhonchi, wheezing, or rubs  HEART: RRR; + systolic murmur best appreciated in the LLSB, but no rubs or gallops  ABDOMEN: Soft, NT, ND; BS present  EXTREMITIES:  2+ Peripheral Pulses, No clubbing, cyanosis, or edema  LYMPH: No lymphadenopathy noted  SKIN: + R inguinal abscess with minimal to no drainage, +L hip tenderness but surgical site is clean and intact, no rashes

## 2019-08-07 NOTE — PROGRESS NOTE ADULT - ASSESSMENT
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  The patient was also evaluated and treated for a right labial abscess likely related to uncontrolled diabetes.    Fall complicated by left hip fracture: status post ORIF. Continue Tramadol PRN for pain.  Post-operative acute blood loss anemia worsened to 7.9; repeat CBC in AM.  Orthopedics follow-up appreciated; WBAT to the LLE.  Medically stable for discharge to   Right Labial Abscess: status post incision and drainage.  Wound cultures were positive for Candida Albicans.  Continue wound care with half-inch iodoform packing, then place 4x4 gauze on top of the wound, then use a Tegaderm on top to secure the dressing.  ID recommended switching to PO Augmentin 875mg PO q12 and Diflucan 400mg PO q24 upon discharge.  Repeat LFTs in AM  Viral cardiomyopathy/moderate AS: repeat echocardiogram demonstrates normal ejection fraction with moderate AS.  Follow-up with cardiology as outpatient.  Uncontrolled DMII: continue Lantus at present dose.  Increase Lispro to 7u with meals  Hypertension: continue Losartan 50mg q24  GI/DVT prophylaxis    #Progress Note Handoff    Pending:    Future Disposition: Medically stable for discharge, pending placement in   Pending worker's comp auth

## 2019-08-07 NOTE — PROGRESS NOTE ADULT - SUBJECTIVE AND OBJECTIVE BOX
Procedure: bedside right groin i and d  intramedullar nailing of femur     GUS WILBURN  68y Female   [id number]    Hospital Day: 7  Post Operative Day:4    Procedure: bedside right groin i and d ,intramedullar nailing of femur     Patient is a 68y old  Female who presents with a chief complaint of mechanical fall (06 Aug 2019 14:12)    PAST MEDICAL & SURGICAL HISTORY:  No pertinent past medical history      Events of the Last 24h:none  Vital Signs Last 24 Hrs  T(C): 36.3 (06 Aug 2019 23:00), Max: 36.4 (06 Aug 2019 07:40)  T(F): 97.4 (06 Aug 2019 23:00), Max: 97.5 (06 Aug 2019 07:40)  HR: 83 (06 Aug 2019 23:00) (75 - 83)  BP: 152/68 (06 Aug 2019 23:00) (144/65 - 152/68)  BP(mean): --  RR: 18 (06 Aug 2019 23:) (18 - 18)  SpO2: --        Diet, Consistent Carbohydrate/No Snacks (19 @ 09:40)      I&O's Summary    05 Aug 2019 07:  -  06 Aug 2019 07:00  --------------------------------------------------------  IN: 450 mL / OUT: 1700 mL / NET: -1250 mL    06 Aug 2019 07:  -  07 Aug 2019 01:37  --------------------------------------------------------  IN: 960 mL / OUT: 700 mL / NET: 260 mL     I&O's Detail    05 Aug 2019 07:  -  06 Aug 2019 07:00  --------------------------------------------------------  IN:    Oral Fluid: 450 mL  Total IN: 450 mL    OUT:    Voided: 1700 mL  Total OUT: 1700 mL    Total NET: -1250 mL      06 Aug 2019 07:  -  07 Aug 2019 01:37  --------------------------------------------------------  IN:    Oral Fluid: 960 mL  Total IN: 960 mL    OUT:    Voided: 700 mL  Total OUT: 700 mL    Total NET: 260 mL          MEDICATIONS  (STANDING):  amLODIPine   Tablet 5 milliGRAM(s) Oral daily  ampicillin/sulbactam  IVPB 3 Gram(s) IV Intermittent every 6 hours  aspirin  chewable 81 milliGRAM(s) Oral daily  atorvastatin 20 milliGRAM(s) Oral at bedtime  chlorhexidine 4% Liquid 1 Application(s) Topical <User Schedule>  dextrose 5%. 1000 milliLiter(s) (50 mL/Hr) IV Continuous <Continuous>  dextrose 50% Injectable 12.5 Gram(s) IV Push once  dextrose 50% Injectable 25 Gram(s) IV Push once  dextrose 50% Injectable 25 Gram(s) IV Push once  docusate sodium 100 milliGRAM(s) Oral daily  fluconAZOLE IVPB 400 milliGRAM(s) IV Intermittent every 24 hours  heparin  Injectable 5000 Unit(s) SubCutaneous every 12 hours  insulin glargine Injectable (LANTUS) 15 Unit(s) SubCutaneous at bedtime  insulin lispro (HumaLOG) corrective regimen sliding scale   SubCutaneous three times a day before meals  insulin lispro Injectable (HumaLOG) 7 Unit(s) SubCutaneous three times a day before meals  losartan 50 milliGRAM(s) Oral daily    MEDICATIONS  (PRN):  acetaminophen   Tablet .. 650 milliGRAM(s) Oral every 6 hours PRN Mild Pain (1 - 3)  ALPRAZolam 0.25 milliGRAM(s) Oral three times a day PRN anxiety  dextrose 40% Gel 15 Gram(s) Oral once PRN Blood Glucose LESS THAN 70 milliGRAM(s)/deciliter  glucagon  Injectable 1 milliGRAM(s) IntraMuscular once PRN Glucose LESS THAN 70 milligrams/deciliter  morphine  - Injectable 2 milliGRAM(s) IV Push every 6 hours PRN Severe Pain (7 - 10)  traMADol 50 milliGRAM(s) Oral every 4 hours PRN Moderate Pain (4 - 6)  zolpidem 5 milliGRAM(s) Oral at bedtime PRN Insomnia      PHYSICAL EXAM:    GENERAL: NAD    HEENT: NCAT    CHEST/LUNGS: CTAB    HEART: RRR,  No murmurs, rubs, or gallops    ABDOMEN: SNTND +BS    EXTREMITIES:  FROM, No clubbing, cyanosis, or edema, palpable pulse    NEURO: No focal neurological deficits    SKIN: No rashes or lesions    INCISION/WOUNDS:                          7.9    5.24  )-----------( 259      ( 06 Aug 2019 05:58 )             25.1        CBC Full  -  ( 06 Aug 2019 05:58 )  WBC Count : 5.24 K/uL  RBC Count : 2.97 M/uL  Hemoglobin : 7.9 g/dL  Hematocrit : 25.1 %  Platelet Count - Automated : 259 K/uL  Mean Cell Volume : 84.5 fL  Mean Cell Hemoglobin : 26.6 pg  Mean Cell Hemoglobin Concentration : 31.5 g/dL  Auto Neutrophil # : x  Auto Lymphocyte # : x  Auto Monocyte # : x  Auto Eosinophil # : x  Auto Basophil # : x  Auto Neutrophil % : x  Auto Lymphocyte % : x  Auto Monocyte % : x  Auto Eosinophil % : x  Auto Basophil % : x               140   |  105   |  11                 Ca: 9.2    BMP:   ----------------------------< 228    M.7   (19 @ 05:58)             4.1    |  27    | 0.6                Ph: x        LFT:     TPro: 5.5 / Alb: 2.8 / TBili: 0.3 / DBili: x / AST: 13 / ALT: 8 / AlkPhos: 96   (19 @ 05:58)    LIVER FUNCTIONS - ( 06 Aug 2019 05:58 )  Alb: 2.8 g/dL / Pro: 5.5 g/dL / ALK PHOS: 96 U/L / ALT: 8 U/L / AST: 13 U/L / GGT: x

## 2019-08-07 NOTE — PROGRESS NOTE ADULT - SUBJECTIVE AND OBJECTIVE BOX
S : No new events/complaints      All other pertinent ROS negative.      08-06-19 @ 07:01  -  08-07-19 @ 07:00  --------------------------------------------------------  IN: 960 mL / OUT: 700 mL / NET: 260 mL    08-07-19 @ 07:01  -  08-07-19 @ 18:35  --------------------------------------------------------  IN: 960 mL / OUT: 520 mL / NET: 440 mL      Vital Signs Last 24 Hrs  T(C): 36.7 (07 Aug 2019 16:18), Max: 37.4 (07 Aug 2019 07:34)  T(F): 98 (07 Aug 2019 16:18), Max: 99.4 (07 Aug 2019 07:34)  HR: 75 (07 Aug 2019 16:18) (75 - 83)  BP: 136/62 (07 Aug 2019 16:18) (136/62 - 152/68)  BP(mean): --  RR: 18 (07 Aug 2019 16:18) (18 - 18)  SpO2: --  PHYSICAL EXAM:    no change from prior      MEDICATIONS:  MEDICATIONS  (STANDING):  amLODIPine   Tablet 5 milliGRAM(s) Oral daily  ampicillin/sulbactam  IVPB 3 Gram(s) IV Intermittent every 6 hours  aspirin  chewable 81 milliGRAM(s) Oral daily  atorvastatin 20 milliGRAM(s) Oral at bedtime  chlorhexidine 4% Liquid 1 Application(s) Topical <User Schedule>  dextrose 5%. 1000 milliLiter(s) (50 mL/Hr) IV Continuous <Continuous>  dextrose 50% Injectable 12.5 Gram(s) IV Push once  dextrose 50% Injectable 25 Gram(s) IV Push once  dextrose 50% Injectable 25 Gram(s) IV Push once  docusate sodium 100 milliGRAM(s) Oral daily  fluconAZOLE IVPB 400 milliGRAM(s) IV Intermittent every 24 hours  heparin  Injectable 5000 Unit(s) SubCutaneous every 12 hours  insulin glargine Injectable (LANTUS) 15 Unit(s) SubCutaneous at bedtime  insulin lispro (HumaLOG) corrective regimen sliding scale   SubCutaneous three times a day before meals  insulin lispro Injectable (HumaLOG) 7 Unit(s) SubCutaneous three times a day before meals  losartan 50 milliGRAM(s) Oral daily      LABS: All Labs Reviewed:                        8.2    5.64  )-----------( 290      ( 07 Aug 2019 07:12 )             26.2     08-07    143  |  105  |  12  ----------------------------<  138<H>  4.2   |  26  |  0.7    Ca    9.3      07 Aug 2019 07:12  Mg     2.0     08-07    TPro  5.9<L>  /  Alb  3.0<L>  /  TBili  0.3  /  DBili  x   /  AST  24  /  ALT  13  /  AlkPhos  105  08-07          Blood Culture:     Radiology: reviewed

## 2019-08-08 LAB
ALBUMIN SERPL ELPH-MCNC: 3 G/DL — LOW (ref 3.5–5.2)
ALP SERPL-CCNC: 99 U/L — SIGNIFICANT CHANGE UP (ref 30–115)
ALT FLD-CCNC: 11 U/L — SIGNIFICANT CHANGE UP (ref 0–41)
ANION GAP SERPL CALC-SCNC: 13 MMOL/L — SIGNIFICANT CHANGE UP (ref 7–14)
AST SERPL-CCNC: 20 U/L — SIGNIFICANT CHANGE UP (ref 0–41)
BILIRUB SERPL-MCNC: 0.3 MG/DL — SIGNIFICANT CHANGE UP (ref 0.2–1.2)
BUN SERPL-MCNC: 15 MG/DL — SIGNIFICANT CHANGE UP (ref 10–20)
CALCIUM SERPL-MCNC: 9.5 MG/DL — SIGNIFICANT CHANGE UP (ref 8.5–10.1)
CHLORIDE SERPL-SCNC: 104 MMOL/L — SIGNIFICANT CHANGE UP (ref 98–110)
CO2 SERPL-SCNC: 23 MMOL/L — SIGNIFICANT CHANGE UP (ref 17–32)
CREAT SERPL-MCNC: 0.8 MG/DL — SIGNIFICANT CHANGE UP (ref 0.7–1.5)
GLUCOSE BLDC GLUCOMTR-MCNC: 101 MG/DL — HIGH (ref 70–99)
GLUCOSE BLDC GLUCOMTR-MCNC: 101 MG/DL — HIGH (ref 70–99)
GLUCOSE BLDC GLUCOMTR-MCNC: 106 MG/DL — HIGH (ref 70–99)
GLUCOSE BLDC GLUCOMTR-MCNC: 111 MG/DL — HIGH (ref 70–99)
GLUCOSE BLDC GLUCOMTR-MCNC: 144 MG/DL — HIGH (ref 70–99)
GLUCOSE BLDC GLUCOMTR-MCNC: 96 MG/DL — SIGNIFICANT CHANGE UP (ref 70–99)
GLUCOSE SERPL-MCNC: 112 MG/DL — HIGH (ref 70–99)
HCT VFR BLD CALC: 26.3 % — LOW (ref 37–47)
HGB BLD-MCNC: 8.2 G/DL — LOW (ref 12–16)
MAGNESIUM SERPL-MCNC: 1.9 MG/DL — SIGNIFICANT CHANGE UP (ref 1.8–2.4)
MCHC RBC-ENTMCNC: 26.5 PG — LOW (ref 27–31)
MCHC RBC-ENTMCNC: 31.2 G/DL — LOW (ref 32–37)
MCV RBC AUTO: 85.1 FL — SIGNIFICANT CHANGE UP (ref 81–99)
NRBC # BLD: 0 /100 WBCS — SIGNIFICANT CHANGE UP (ref 0–0)
PLATELET # BLD AUTO: 295 K/UL — SIGNIFICANT CHANGE UP (ref 130–400)
POTASSIUM SERPL-MCNC: 4.4 MMOL/L — SIGNIFICANT CHANGE UP (ref 3.5–5)
POTASSIUM SERPL-SCNC: 4.4 MMOL/L — SIGNIFICANT CHANGE UP (ref 3.5–5)
PROT SERPL-MCNC: 6.2 G/DL — SIGNIFICANT CHANGE UP (ref 6–8)
RBC # BLD: 3.09 M/UL — LOW (ref 4.2–5.4)
RBC # FLD: 13.4 % — SIGNIFICANT CHANGE UP (ref 11.5–14.5)
SODIUM SERPL-SCNC: 140 MMOL/L — SIGNIFICANT CHANGE UP (ref 135–146)
WBC # BLD: 5.99 K/UL — SIGNIFICANT CHANGE UP (ref 4.8–10.8)
WBC # FLD AUTO: 5.99 K/UL — SIGNIFICANT CHANGE UP (ref 4.8–10.8)

## 2019-08-08 PROCEDURE — 99233 SBSQ HOSP IP/OBS HIGH 50: CPT

## 2019-08-08 RX ADMIN — HEPARIN SODIUM 5000 UNIT(S): 5000 INJECTION INTRAVENOUS; SUBCUTANEOUS at 17:43

## 2019-08-08 RX ADMIN — TRAMADOL HYDROCHLORIDE 50 MILLIGRAM(S): 50 TABLET ORAL at 08:04

## 2019-08-08 RX ADMIN — ATORVASTATIN CALCIUM 20 MILLIGRAM(S): 80 TABLET, FILM COATED ORAL at 21:33

## 2019-08-08 RX ADMIN — TRAMADOL HYDROCHLORIDE 50 MILLIGRAM(S): 50 TABLET ORAL at 00:00

## 2019-08-08 RX ADMIN — HEPARIN SODIUM 5000 UNIT(S): 5000 INJECTION INTRAVENOUS; SUBCUTANEOUS at 05:53

## 2019-08-08 RX ADMIN — TRAMADOL HYDROCHLORIDE 50 MILLIGRAM(S): 50 TABLET ORAL at 12:43

## 2019-08-08 RX ADMIN — Medication 81 MILLIGRAM(S): at 11:17

## 2019-08-08 RX ADMIN — LOSARTAN POTASSIUM 50 MILLIGRAM(S): 100 TABLET, FILM COATED ORAL at 05:54

## 2019-08-08 RX ADMIN — AMPICILLIN SODIUM AND SULBACTAM SODIUM 200 GRAM(S): 250; 125 INJECTION, POWDER, FOR SUSPENSION INTRAMUSCULAR; INTRAVENOUS at 08:19

## 2019-08-08 RX ADMIN — AMPICILLIN SODIUM AND SULBACTAM SODIUM 200 GRAM(S): 250; 125 INJECTION, POWDER, FOR SUSPENSION INTRAMUSCULAR; INTRAVENOUS at 12:43

## 2019-08-08 RX ADMIN — AMPICILLIN SODIUM AND SULBACTAM SODIUM 200 GRAM(S): 250; 125 INJECTION, POWDER, FOR SUSPENSION INTRAMUSCULAR; INTRAVENOUS at 18:43

## 2019-08-08 RX ADMIN — FLUCONAZOLE 100 MILLIGRAM(S): 150 TABLET ORAL at 01:03

## 2019-08-08 RX ADMIN — TRAMADOL HYDROCHLORIDE 50 MILLIGRAM(S): 50 TABLET ORAL at 19:57

## 2019-08-08 RX ADMIN — AMPICILLIN SODIUM AND SULBACTAM SODIUM 200 GRAM(S): 250; 125 INJECTION, POWDER, FOR SUSPENSION INTRAMUSCULAR; INTRAVENOUS at 01:03

## 2019-08-08 RX ADMIN — Medication 100 MILLIGRAM(S): at 11:17

## 2019-08-08 RX ADMIN — Medication 7 UNIT(S): at 08:20

## 2019-08-08 RX ADMIN — TRAMADOL HYDROCHLORIDE 50 MILLIGRAM(S): 50 TABLET ORAL at 11:17

## 2019-08-08 RX ADMIN — Medication 7 UNIT(S): at 17:43

## 2019-08-08 RX ADMIN — INSULIN GLARGINE 15 UNIT(S): 100 INJECTION, SOLUTION SUBCUTANEOUS at 21:33

## 2019-08-08 RX ADMIN — TRAMADOL HYDROCHLORIDE 50 MILLIGRAM(S): 50 TABLET ORAL at 09:41

## 2019-08-08 RX ADMIN — AMLODIPINE BESYLATE 5 MILLIGRAM(S): 2.5 TABLET ORAL at 05:53

## 2019-08-08 NOTE — PROGRESS NOTE ADULT - ASSESSMENT
· Plan		  #. Intertrochantric fracture of the femur s/p IMN of the femur   - mechanical fall  - CBC stable, f/u cbc  - daily dressing changes  - rehab with OT 2-3 times/week  - for moderate pain: control with tramadol 50 mg po q4h prn and for severe pain: control with morphine 2mg IV q6h prn   - full weight bearing, AAT  - pending workers comp for 4A placement    #. R inguinal abscess   - IV fluconazole  - IV unsyn 3 g q6h  - s/p i&d  - culture grew candida  - Id following     #. Hyperglycemia/DMt2  - continue Lantus at 15 units, continue lispro to 7units as FSBG levels are decreased, so patient is stable on this regimen  - continue bg finger sticks qAC    #. HTN  - continue losartan 50 mg po daily  - added amlodipine 5    #. HLD  - continue atorvastatin 20 mg po qhs    #. viral cardiomyopathy  - follow up OP #. Intertrochantric fracture of the femur s/p IMN of the femur   - mechanical fall  - CBC stable, f/u cbc  - daily dressing changes  - rehab with OT 2-3 times/week  - for moderate pain: control with tramadol 50 mg po q4h prn and for severe pain: control with morphine 2mg IV q6h prn   - full weight bearing, AAT  - pending workers comp for 4A placement    #. R inguinal abscess   - IV fluconazole  - IV unsyn 3 g q6h  - s/p i&d  - culture grew candida  - Id following  - can switch to augmentin 875 BID and fluconazole 400 PO until 8/10 if discharged     #. Hyperglycemia/DMt2  - continue Lantus at 15 units, continue lispro to 7units as FSBG levels are decreased, so patient is stable on this regimen  - continue bg finger sticks qAC    #. HTN  - continue losartan 50 mg po daily  - added amlodipine 5    #. HLD  - continue atorvastatin 20 mg po qhs    #. viral cardiomyopathy  - follow up OP    Activity: AAT  diet: carb consistent  dvt ppx: heparin subq  gi ppx: not indicated  full code  dispo: from home, pending 4A

## 2019-08-08 NOTE — DIETITIAN INITIAL EVALUATION ADULT. - ENERGY NEEDS
estimated energy needs: 1330-1445kcal (MSJx1.2-1.3AF)  estimated protein needs: 60-72g (1.0-1.2g/kg)  estimated fluid needs: per LIP or 1ml/kcal

## 2019-08-08 NOTE — DIETITIAN INITIAL EVALUATION ADULT. - FACTORS AFF FOOD INTAKE
Pt reports good appetite and PO intake. Consuming >75% of meal trays during LOS. Says PTA, had a decrease in appetite and PO intake but has returned to normal since admission to hospital. NKFA. Denies prior vit/min supplementation. Denies chew/swallow difficulty. Last BM 8/6- no GI s/s reported.

## 2019-08-08 NOTE — PROGRESS NOTE ADULT - SUBJECTIVE AND OBJECTIVE BOX
69 y/o female s/p Left hip IM nailing 08/03/2019 POD#5.  Reports pain (3/10) controlled by pain meds. Denies any SOB, palpitations, chest pain, dizziness or any other c/o.  Tolerates PT well.  Vital Signs Last 24 Hrs  T(C): 36.7 (08 Aug 2019 05:30), Max: 37.4 (07 Aug 2019 07:34)  T(F): 98 (08 Aug 2019 05:30), Max: 99.4 (07 Aug 2019 07:34)  HR: 78 (08 Aug 2019 05:30) (74 - 78)  BP: 148/62 (08 Aug 2019 05:30) (136/62 - 163/77)  BP(mean): --  RR: 18 (08 Aug 2019 05:30) (18 - 18)  SpO2: --    GEN: WN/WD, A&O X 3, NAD;  Left hip and LE:  Incisions are clean and dry, staples intact, decreased ROM secondary to pain, NVI, no decreased sensation, dorsi/plantar flexes ankle, no calf tenderness, DP pulse 2+.  s/p Left hip gamma nail POD#5:  - Orthopedically stable;  - staples to be removed 2 weeks post-op;  - continue current Tx and PT;  - DVT prophylaxis;  - pain management;  - WBAT, fall precautions;  - d/c plan - per primary team;  - reconsult Ortho PRN.

## 2019-08-08 NOTE — PROGRESS NOTE ADULT - ATTENDING COMMENTS
above noted abdomen soft groin abscess healing well for rehab
Orthopedic Attending    Patient seen and examined with resident/PA.  Any new laboratory work-up and consults reviewed.  Any new radiographs were reviewed.   Agree with findings, assessment and plan.
pt seen and examined.  agree w above.  rehab
pt seen and examined.  cont current management  wbat  rehab  dc planning
above noted abdomen soft  abscess healing well
above noted abdomen soft abscess open with adequate drainage
above noted abdomen soft dressing changed abscess drained well
above noted abdomen soft groin abscess healing slowly
above noted abdomen soft groin abscess healing well pt examined by me on 8/6
above noted abdomen soft no distension abscess cavity healing slowly

## 2019-08-08 NOTE — DIETITIAN INITIAL EVALUATION ADULT. - RD TO REMAIN AVAILABLE
yes/RD to monitor diet order, energy intake, NFPF, body comp, glucose and renal profile. Pt not at risk f/u 7 days

## 2019-08-08 NOTE — PROGRESS NOTE ADULT - ASSESSMENT
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  The patient was also evaluated and treated for a right labial abscess likely related to uncontrolled diabetes.    Fall complicated by left hip fracture: status post ORIF. Continue Tramadol PRN for pain.  Post-operative acute blood loss anemia stabilized @ 8.2.  Her wound is clean and intact.  WBAT to the LLE.  Continue daily physical therapy  Right Labial Abscess: status post incision and drainage.  Wound cultures were positive for Candida Albicans.  Continue wound care with half-inch iodoform packing, then place 4x4 gauze on top of the wound, then use a Tegaderm on top to secure the dressing.  Switch to Augmentin 875mg PO q12 and Diflucan 400mg PO q24 until 8/10/19.  Liver function tests are within normal limits.  Viral cardiomyopathy/moderate AS: repeat echocardiogram demonstrates normal ejection fraction with moderate AS.  Follow-up with cardiology as outpatient.  Uncontrolled DMII: Continue Lantus/Lispro as directed  Hypertension: continue Losartan 50mg q24  GI/DVT prophylaxis    #Progress Note Handoff    Pending:    Future Disposition: Awaiting insurance authorization for inpatient rehab

## 2019-08-08 NOTE — DIETITIAN INITIAL EVALUATION ADULT. - OTHER INFO
Pt admitted s/p mechanical fall complicated by L femoral fx. The patient was also evaluated and treated for a right labial abscess likely related to uncontrolled diabetes.

## 2019-08-08 NOTE — DIETITIAN INITIAL EVALUATION ADULT. - PHYSICAL APPEARANCE
BMI: 23.4. IBW: 115#+/-10%. UBW unknown. Wt changes unknown. No edema, wound to R upper inner thigh noted. No physical signs of wasting observed.

## 2019-08-09 ENCOUNTER — TRANSCRIPTION ENCOUNTER (OUTPATIENT)
Age: 69
End: 2019-08-09

## 2019-08-09 LAB
ALBUMIN SERPL ELPH-MCNC: 3 G/DL — LOW (ref 3.5–5.2)
ALP SERPL-CCNC: 95 U/L — SIGNIFICANT CHANGE UP (ref 30–115)
ALT FLD-CCNC: 10 U/L — SIGNIFICANT CHANGE UP (ref 0–41)
ANION GAP SERPL CALC-SCNC: 12 MMOL/L — SIGNIFICANT CHANGE UP (ref 7–14)
AST SERPL-CCNC: 20 U/L — SIGNIFICANT CHANGE UP (ref 0–41)
BILIRUB SERPL-MCNC: 0.3 MG/DL — SIGNIFICANT CHANGE UP (ref 0.2–1.2)
BUN SERPL-MCNC: 13 MG/DL — SIGNIFICANT CHANGE UP (ref 10–20)
CALCIUM SERPL-MCNC: 9.3 MG/DL — SIGNIFICANT CHANGE UP (ref 8.5–10.1)
CHLORIDE SERPL-SCNC: 103 MMOL/L — SIGNIFICANT CHANGE UP (ref 98–110)
CO2 SERPL-SCNC: 23 MMOL/L — SIGNIFICANT CHANGE UP (ref 17–32)
CREAT SERPL-MCNC: 0.7 MG/DL — SIGNIFICANT CHANGE UP (ref 0.7–1.5)
GLUCOSE BLDC GLUCOMTR-MCNC: 119 MG/DL — HIGH (ref 70–99)
GLUCOSE BLDC GLUCOMTR-MCNC: 78 MG/DL — SIGNIFICANT CHANGE UP (ref 70–99)
GLUCOSE BLDC GLUCOMTR-MCNC: 79 MG/DL — SIGNIFICANT CHANGE UP (ref 70–99)
GLUCOSE BLDC GLUCOMTR-MCNC: 82 MG/DL — SIGNIFICANT CHANGE UP (ref 70–99)
GLUCOSE BLDC GLUCOMTR-MCNC: 93 MG/DL — SIGNIFICANT CHANGE UP (ref 70–99)
GLUCOSE SERPL-MCNC: 85 MG/DL — SIGNIFICANT CHANGE UP (ref 70–99)
HCT VFR BLD CALC: 24.4 % — LOW (ref 37–47)
HGB BLD-MCNC: 7.9 G/DL — LOW (ref 12–16)
MAGNESIUM SERPL-MCNC: 1.8 MG/DL — SIGNIFICANT CHANGE UP (ref 1.8–2.4)
MCHC RBC-ENTMCNC: 27.2 PG — SIGNIFICANT CHANGE UP (ref 27–31)
MCHC RBC-ENTMCNC: 32.4 G/DL — SIGNIFICANT CHANGE UP (ref 32–37)
MCV RBC AUTO: 84.1 FL — SIGNIFICANT CHANGE UP (ref 81–99)
NRBC # BLD: 0 /100 WBCS — SIGNIFICANT CHANGE UP (ref 0–0)
PLATELET # BLD AUTO: 292 K/UL — SIGNIFICANT CHANGE UP (ref 130–400)
POTASSIUM SERPL-MCNC: 4.2 MMOL/L — SIGNIFICANT CHANGE UP (ref 3.5–5)
POTASSIUM SERPL-SCNC: 4.2 MMOL/L — SIGNIFICANT CHANGE UP (ref 3.5–5)
PROT SERPL-MCNC: 5.9 G/DL — LOW (ref 6–8)
RBC # BLD: 2.9 M/UL — LOW (ref 4.2–5.4)
RBC # FLD: 13.6 % — SIGNIFICANT CHANGE UP (ref 11.5–14.5)
SODIUM SERPL-SCNC: 138 MMOL/L — SIGNIFICANT CHANGE UP (ref 135–146)
WBC # BLD: 4.93 K/UL — SIGNIFICANT CHANGE UP (ref 4.8–10.8)
WBC # FLD AUTO: 4.93 K/UL — SIGNIFICANT CHANGE UP (ref 4.8–10.8)

## 2019-08-09 PROCEDURE — 99233 SBSQ HOSP IP/OBS HIGH 50: CPT

## 2019-08-09 RX ADMIN — ZOLPIDEM TARTRATE 5 MILLIGRAM(S): 10 TABLET ORAL at 00:46

## 2019-08-09 RX ADMIN — Medication 81 MILLIGRAM(S): at 12:21

## 2019-08-09 RX ADMIN — Medication 650 MILLIGRAM(S): at 12:50

## 2019-08-09 RX ADMIN — TRAMADOL HYDROCHLORIDE 50 MILLIGRAM(S): 50 TABLET ORAL at 01:50

## 2019-08-09 RX ADMIN — AMPICILLIN SODIUM AND SULBACTAM SODIUM 200 GRAM(S): 250; 125 INJECTION, POWDER, FOR SUSPENSION INTRAMUSCULAR; INTRAVENOUS at 17:13

## 2019-08-09 RX ADMIN — FLUCONAZOLE 100 MILLIGRAM(S): 150 TABLET ORAL at 00:49

## 2019-08-09 RX ADMIN — TRAMADOL HYDROCHLORIDE 50 MILLIGRAM(S): 50 TABLET ORAL at 01:03

## 2019-08-09 RX ADMIN — LOSARTAN POTASSIUM 50 MILLIGRAM(S): 100 TABLET, FILM COATED ORAL at 05:32

## 2019-08-09 RX ADMIN — Medication 650 MILLIGRAM(S): at 12:20

## 2019-08-09 RX ADMIN — TRAMADOL HYDROCHLORIDE 50 MILLIGRAM(S): 50 TABLET ORAL at 15:19

## 2019-08-09 RX ADMIN — ATORVASTATIN CALCIUM 20 MILLIGRAM(S): 80 TABLET, FILM COATED ORAL at 21:12

## 2019-08-09 RX ADMIN — Medication 100 MILLIGRAM(S): at 12:21

## 2019-08-09 RX ADMIN — ZOLPIDEM TARTRATE 5 MILLIGRAM(S): 10 TABLET ORAL at 23:45

## 2019-08-09 RX ADMIN — TRAMADOL HYDROCHLORIDE 50 MILLIGRAM(S): 50 TABLET ORAL at 21:16

## 2019-08-09 RX ADMIN — AMPICILLIN SODIUM AND SULBACTAM SODIUM 200 GRAM(S): 250; 125 INJECTION, POWDER, FOR SUSPENSION INTRAMUSCULAR; INTRAVENOUS at 00:47

## 2019-08-09 RX ADMIN — HEPARIN SODIUM 5000 UNIT(S): 5000 INJECTION INTRAVENOUS; SUBCUTANEOUS at 05:32

## 2019-08-09 RX ADMIN — AMPICILLIN SODIUM AND SULBACTAM SODIUM 200 GRAM(S): 250; 125 INJECTION, POWDER, FOR SUSPENSION INTRAMUSCULAR; INTRAVENOUS at 05:32

## 2019-08-09 RX ADMIN — INSULIN GLARGINE 15 UNIT(S): 100 INJECTION, SOLUTION SUBCUTANEOUS at 21:13

## 2019-08-09 RX ADMIN — HEPARIN SODIUM 5000 UNIT(S): 5000 INJECTION INTRAVENOUS; SUBCUTANEOUS at 17:14

## 2019-08-09 RX ADMIN — TRAMADOL HYDROCHLORIDE 50 MILLIGRAM(S): 50 TABLET ORAL at 05:49

## 2019-08-09 RX ADMIN — AMPICILLIN SODIUM AND SULBACTAM SODIUM 200 GRAM(S): 250; 125 INJECTION, POWDER, FOR SUSPENSION INTRAMUSCULAR; INTRAVENOUS at 12:18

## 2019-08-09 RX ADMIN — CHLORHEXIDINE GLUCONATE 1 APPLICATION(S): 213 SOLUTION TOPICAL at 05:32

## 2019-08-09 RX ADMIN — AMLODIPINE BESYLATE 5 MILLIGRAM(S): 2.5 TABLET ORAL at 05:32

## 2019-08-09 NOTE — DISCHARGE NOTE PROVIDER - PROVIDER TOKENS
PROVIDER:[TOKEN:[[id number]]],PROVIDER:[TOKEN:[[id number]]] PROVIDER:[TOKEN:[[id number]]],PROVIDER:[TOKEN:[[id number]]],PROVIDER:[TOKEN:[[id number]]]

## 2019-08-09 NOTE — DISCHARGE NOTE PROVIDER - NSDCFUADDINST_GEN_ALL_CORE_FT
Please follow up with your PCP in 1-2 weeks for optimization of your hypertension and diabetes medications.

## 2019-08-09 NOTE — PROGRESS NOTE ADULT - SUBJECTIVE AND OBJECTIVE BOX
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  The patient was also evaluated and treated for a right labial abscess likely related to uncontrolled diabetes.  Status post ORIF    Overnight events/Updates: Her right groin wound has almost completely healed from admission.  The patient has been ambulating with use of a walker.    Vital Signs Last 24 Hrs  T(C): 36 (09 Aug 2019 07:30), Max: 36.3 (08 Aug 2019 16:22)  T(F): 96.8 (09 Aug 2019 07:30), Max: 97.3 (08 Aug 2019 16:22)  HR: 84 (09 Aug 2019 07:30) (78 - 84)  BP: 154/67 (09 Aug 2019 07:30) (142/69 - 154/67)  BP(mean): --  RR: 18 (09 Aug 2019 07:30) (18 - 18)  SpO2: --    Physical Examination:  General: AAO x 3  HEENT: PERRLA, EOMI  CV= S1 & S2 appreciated  Lungs=CTA BL  Abdominal Examination= + BS, Soft, NT/ND  Extremity Examination= No C/C/    ROS: No chest pain, no shortness of breath.  All other systems reviewed and are within normal limits except for the complaints in the HPI.    MEDICATIONS  (STANDING):  amLODIPine   Tablet 5 milliGRAM(s) Oral daily  ampicillin/sulbactam  IVPB 3 Gram(s) IV Intermittent every 6 hours  aspirin  chewable 81 milliGRAM(s) Oral daily  atorvastatin 20 milliGRAM(s) Oral at bedtime  chlorhexidine 4% Liquid 1 Application(s) Topical <User Schedule>  dextrose 5%. 1000 milliLiter(s) (50 mL/Hr) IV Continuous <Continuous>  dextrose 50% Injectable 12.5 Gram(s) IV Push once  dextrose 50% Injectable 25 Gram(s) IV Push once  dextrose 50% Injectable 25 Gram(s) IV Push once  docusate sodium 100 milliGRAM(s) Oral daily  fluconAZOLE IVPB 400 milliGRAM(s) IV Intermittent every 24 hours  heparin  Injectable 5000 Unit(s) SubCutaneous every 12 hours  insulin glargine Injectable (LANTUS) 15 Unit(s) SubCutaneous at bedtime  insulin lispro (HumaLOG) corrective regimen sliding scale   SubCutaneous three times a day before meals  insulin lispro Injectable (HumaLOG) 7 Unit(s) SubCutaneous three times a day before meals  losartan 50 milliGRAM(s) Oral daily    MEDICATIONS  (PRN):  acetaminophen   Tablet .. 650 milliGRAM(s) Oral every 6 hours PRN Mild Pain (1 - 3)  ALPRAZolam 0.25 milliGRAM(s) Oral three times a day PRN anxiety  dextrose 40% Gel 15 Gram(s) Oral once PRN Blood Glucose LESS THAN 70 milliGRAM(s)/deciliter  glucagon  Injectable 1 milliGRAM(s) IntraMuscular once PRN Glucose LESS THAN 70 milligrams/deciliter  morphine  - Injectable 2 milliGRAM(s) IV Push every 6 hours PRN Severe Pain (7 - 10)  traMADol 50 milliGRAM(s) Oral every 4 hours PRN Moderate Pain (4 - 6)  zolpidem 5 milliGRAM(s) Oral at bedtime PRN Insomnia                            7.9    4.93  )-----------( 292      ( 09 Aug 2019 06:36 )             24.4     08-09    138  |  103  |  13  ----------------------------<  85  4.2   |  23  |  0.7    Ca    9.3      09 Aug 2019 06:36  Mg     1.8     08-09    TPro  5.9<L>  /  Alb  3.0<L>  /  TBili  0.3  /  DBili  x   /  AST  20  /  ALT  10  /  AlkPhos  95  08-09      Case discussed with housestaff & family  MILTON Steiner [id number]

## 2019-08-09 NOTE — DISCHARGE NOTE PROVIDER - NSDCCPCAREPLAN_GEN_ALL_CORE_FT
PRINCIPAL DISCHARGE DIAGNOSIS  Diagnosis: Subtrochanteric fracture of left femur  Assessment and Plan of Treatment: Please continue rehab and physical therapy as tolerated on 4A. Please follow up with primary doctor and orthopedic surgeon for follow up.      SECONDARY DISCHARGE DIAGNOSES  Diagnosis: Abscess of right leg  Assessment and Plan of Treatment:     Diagnosis: Hyperglycemia  Assessment and Plan of Treatment: PRINCIPAL DISCHARGE DIAGNOSIS  Diagnosis: Subtrochanteric fracture of left femur  Assessment and Plan of Treatment: Please continue rehab and physical therapy as tolerated on 4A. Please follow up with primary doctor and orthopedic surgeon for follow up.      SECONDARY DISCHARGE DIAGNOSES  Diagnosis: Abscess of right leg  Assessment and Plan of Treatment: Please keep area clean and dry. After 8/10/19 no more antibiotics are necessary. Please seek medical attention if area looks inflamed, starts to drain pus, or looks to be reinfected.    Diagnosis: Hyperglycemia  Assessment and Plan of Treatment: Please follow up with your primary medical doctor to set up an optimal glucose controlling therapy outpatient.

## 2019-08-09 NOTE — PROGRESS NOTE ADULT - ASSESSMENT
#. Intertrochantric fracture of the femur s/p IMN of the femur   - mechanical fall  - CBC stable, f/u cbc  - daily dressing changes  - rehab with OT 2-3 times/week  - for moderate pain: control with tramadol 50 mg po q4h prn and for severe pain: control with morphine 2mg IV q6h prn   - full weight bearing, AAT  - pending workers comp for 4A placement    #. R inguinal abscess   - IV fluconazole  - IV unsyn 3 g q6h  - s/p i&d  - culture grew candida  - Id following  - can switch to augmentin 875 BID and fluconazole 400 PO until 8/10 (tomorrow)  if discharged     #. Hyperglycemia/DMt2  - continue Lantus at 15 units, continue lispro to 7units as FSBG levels are decreased, so patient is stable on this regimen  - continue bg finger sticks qAC    #. HTN  - continue losartan 50 mg po daily  - added amlodipine 5    #. HLD  - continue atorvastatin 20 mg po qhs    #. viral cardiomyopathy  - follow up OP    Activity: AAT  diet: carb consistent  dvt ppx: heparin subq  gi ppx: not indicated  full code  dispo: from home, pending 4A

## 2019-08-09 NOTE — PROGRESS NOTE ADULT - SUBJECTIVE AND OBJECTIVE BOX
DIAGNOSIS:   HOSPITAL DAY #:    STATUS POST:    POST OPERATIVE DAY #:     Vital Signs Last 24 Hrs  T(C): 36.3 (09 Aug 2019 16:04), Max: 36.3 (09 Aug 2019 16:04)  T(F): 97.4 (09 Aug 2019 16:04), Max: 97.4 (09 Aug 2019 16:04)  HR: 86 (09 Aug 2019 16:04) (78 - 86)  BP: 152/67 (09 Aug 2019 16:04) (148/67 - 154/67)  BP(mean): --  RR: 18 (09 Aug 2019 16:04) (18 - 18)  SpO2: --    SUBJECTIVE: Pt seen    Pain: YES  [ ]   NO [ ]   Nausea: [ ] YES [ ] NO           Vomiting: [ ] YES [ ] NO  Flatus: [ ] YES [ ] NO             Bowel Movement: [ ] YES [ ] NO     Void: [ ]YES [ ]No      ALEE DRAINAGE: SIGNIFICANT [ ]   NOT SIGNIFICANT [ ]   NOT APPLICABLE [ ]  YES [ ] NO    General Appearance: Appears well, NAD  Neck: Supple  Chest: Equal expansion bilaterally, equal breath sounds  CV: Pulse regular presently  Abdomen: Soft [x ] YES [ ]NO  DISTENDED [ ] YES [x ] NO TENDERNESS [ ]YES [x ]NO  INCISIONS: HEALING WELL [ ] YES  [ ] NO ERYTHEMA [ ] YES [ ] NO DRAINAGE [ ] YES  [ ] NO  Extremities: Grossly symmetric, CALF TENDERNESS [ ] YES  [ ] NO  groin abscess healing well      LABS:                        7.9    4.93  )-----------( 292      ( 09 Aug 2019 06:36 )             24.4     08-09    138  |  103  |  13  ----------------------------<  85  4.2   |  23  |  0.7    Ca    9.3      09 Aug 2019 06:36  Mg     1.8     08-09    TPro  5.9<L>  /  Alb  3.0<L>  /  TBili  0.3  /  DBili  x   /  AST  20  /  ALT  10  /  AlkPhos  95  08-09            ASSESSMENT:     GOOD POST OP COURSE [ ]  YES  [ ] NO  CONDITION IMPROVING  []  YES  [ ]  NO          PLAN:    CONTINUE PRESENT MANAGEMENT  [ ] YES  [ ] NO

## 2019-08-09 NOTE — PROGRESS NOTE ADULT - ASSESSMENT
ASSESSMENT  69 yo female with hx of Viral myopathy, DM2, and non-compliance  presenting with L hip fracture and R groin abscess (took amox at home)    IMPRESSION  #R groin abscess    s/p I&D 8/2, cx with Moderate Presumptive Candida albicans (did take po amox prior) with presently no ongoing abscess or cellulitis    Sepsis ruled out on admission   #Left hip fracture, s/p OR  #Hyponatremia  #BCX NG    RECOMMENDATIONS  -  PO augmentin 875mg BID and fluc 400mg PO daily end 8/10

## 2019-08-09 NOTE — PROGRESS NOTE ADULT - SUBJECTIVE AND OBJECTIVE BOX
Hospital Day:  8d    Subjective:    Patient is a 68y old  Female who presents with a chief complaint of mechanical fall (09 Aug 2019 06:18)    Interval: No acute events overnight. Patient ambulating and feeling well. Awaiting 4A placement.    Past Medical Hx:   No pertinent past medical history    Past Sx:    Allergies:  No Known Allergies    Current Meds:   Standng Meds:  amLODIPine   Tablet 5 milliGRAM(s) Oral daily  ampicillin/sulbactam  IVPB 3 Gram(s) IV Intermittent every 6 hours  aspirin  chewable 81 milliGRAM(s) Oral daily  atorvastatin 20 milliGRAM(s) Oral at bedtime  chlorhexidine 4% Liquid 1 Application(s) Topical <User Schedule>  dextrose 5%. 1000 milliLiter(s) (50 mL/Hr) IV Continuous <Continuous>  dextrose 50% Injectable 12.5 Gram(s) IV Push once  dextrose 50% Injectable 25 Gram(s) IV Push once  dextrose 50% Injectable 25 Gram(s) IV Push once  docusate sodium 100 milliGRAM(s) Oral daily  fluconAZOLE IVPB 400 milliGRAM(s) IV Intermittent every 24 hours  heparin  Injectable 5000 Unit(s) SubCutaneous every 12 hours  insulin glargine Injectable (LANTUS) 15 Unit(s) SubCutaneous at bedtime  insulin lispro (HumaLOG) corrective regimen sliding scale   SubCutaneous three times a day before meals  insulin lispro Injectable (HumaLOG) 7 Unit(s) SubCutaneous three times a day before meals  losartan 50 milliGRAM(s) Oral daily    PRN Meds:  acetaminophen   Tablet .. 650 milliGRAM(s) Oral every 6 hours PRN Mild Pain (1 - 3)  ALPRAZolam 0.25 milliGRAM(s) Oral three times a day PRN anxiety  dextrose 40% Gel 15 Gram(s) Oral once PRN Blood Glucose LESS THAN 70 milliGRAM(s)/deciliter  glucagon  Injectable 1 milliGRAM(s) IntraMuscular once PRN Glucose LESS THAN 70 milligrams/deciliter  morphine  - Injectable 2 milliGRAM(s) IV Push every 6 hours PRN Severe Pain (7 - 10)  traMADol 50 milliGRAM(s) Oral every 4 hours PRN Moderate Pain (4 - 6)  zolpidem 5 milliGRAM(s) Oral at bedtime PRN Insomnia    HOME MEDICATIONS:  aspirin 81 mg oral tablet: 1 tab(s) orally once a day      Vital Signs:   T(F): 96.8 (08-09-19 @ 07:30), Max: 97.3 (08-08-19 @ 16:22)  HR: 84 (08-09-19 @ 07:30) (77 - 84)  BP: 154/67 (08-09-19 @ 07:30) (124/60 - 154/67)  RR: 18 (08-09-19 @ 07:30) (18 - 18)  SpO2: --      08-08-19 @ 07:01  -  08-09-19 @ 07:00  --------------------------------------------------------  IN: 960 mL / OUT: 700 mL / NET: 260 mL        Physical Exam:   GENERAL: NAD  HEENT: NCAT  CHEST/LUNG: CTAB  HEART: Regular rate and rhythm; s1 s2 appreciated, No murmurs, rubs, or gallops  ABDOMEN: Soft, Nontender, Nondistended; Bowel sounds present  EXTREMITIES: No LE edema b/l  NERVOUS SYSTEM:  Alert & Oriented X3        Labs:                         7.9    4.93  )-----------( 292      ( 09 Aug 2019 06:36 )             24.4       09 Aug 2019 06:36    138    |  103    |  13     ----------------------------<  85     4.2     |  23     |  0.7      Ca    9.3        09 Aug 2019 06:36  Mg     1.8       09 Aug 2019 06:36    TPro  5.9    /  Alb  3.0    /  TBili  0.3    /  DBili  x      /  AST  20     /  ALT  10     /  AlkPhos  95     09 Aug 2019 06:36          Hemoglobin A1C, Whole Blood: 15.3 % (08-02-19 @ 18:37)  Hemoglobin A1C, Whole Blood: 15.5 % (08-02-19 @ 05:59)

## 2019-08-09 NOTE — PROGRESS NOTE ADULT - ASSESSMENT
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  The patient was also evaluated and treated for a right labial abscess likely related to uncontrolled diabetes.    Fall complicated by left hip fracture: status post ORIF. Continue Tramadol PRN for pain.  Post-operative acute blood loss anemia decreased slightly to 7.9.  Repeat CBC in AM  WBAT to the LLE.  Right Labial Abscess: status post incision and drainage.  Wound cultures were positive for Candida Albicans.  Her wound has almost completely healed.  Complete treatment with  Augmentin 875mg PO q12 and Diflucan 400mg PO q24 tomorrow.  LFTs are stable.  Viral cardiomyopathy/moderate AS: repeat echocardiogram demonstrates normal ejection fraction with moderate AS.  Follow-up with cardiology as outpatient.  Uncontrolled DMII: FS have stabilized from admission.  Switch to Metformin 500mg PO q12 and Januvia 50mg PO q24.  Hypertension: continue Losartan 50mg q24  GI/DVT prophylaxis    #Progress Note Handoff    Pending:    Future Disposition: Awaiting workers compensation for 4A transfer

## 2019-08-09 NOTE — DISCHARGE NOTE PROVIDER - CARE PROVIDERS DIRECT ADDRESSES
,DirectAddress_Unknown,[email or web address] ,DirectAddress_Unknown,[email or web address],DirectAddress_Unknown

## 2019-08-09 NOTE — DISCHARGE NOTE PROVIDER - CARE PROVIDER_API CALL
Aspen Mota)  Surgery  [address] [place] [address]  Phone: [phone]  Fax: [phone]  Follow Up Time:     Darling Santiago)  Surgery of the Hand  [address] [place] [address]  Phone: [phone]  Fax: [phone]  Follow Up Time: Aspen Mota)  Surgery  [address] [place] [address]  Phone: [phone]  Fax: [phone]  Follow Up Time:     Darling Santiago)  Surgery of the Hand  [facility] [place] [address]  Phone: [phone]  Fax: [phone]  Follow Up Time:     Yogesh Barbosa)  Medicine  [address] [place] [address]  Phone: [phone]  Fax: [phone]  Follow Up Time:

## 2019-08-09 NOTE — DISCHARGE NOTE PROVIDER - HOSPITAL COURSE
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  As per patient she was walking and tripped on her feet.  She fell and did not hit her head, did not loose consciousness and did not having any incontinence. In the ED patient was complaining of Left hip pain and inability to ambulate. Also reports an abscess in her Right groin that she has been self-medicating with Amoxicillin 850mg X 8 day prior to fall. Patient had incision and drainage at bedside for groin abscess. Culture was taken and she was started on vancomycin and unasyn. She was found to have an intertrochanteric fracture of the left hip and went for intramedullary nailing. Cultures came back from abscess for candida and she was switched to IV fluconazole and unasyn. Recovery was otherwise unremarkable. Abscess continued to heal well with wound care. Patient was approved by physiatry for intensive hospital based rehab on [facility] 4A. 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  As per patient she was walking and tripped on her feet.  She fell and did not hit her head, did not loose consciousness and did not having any incontinence. In the ED patient was complaining of Left hip pain and inability to ambulate. Also reports an abscess in her Right groin that she has been self-medicating with Amoxicillin 850mg X 8 day prior to fall. Patient had incision and drainage at bedside for groin abscess. Culture was taken and she was started on vancomycin and unasyn. She was found to have an intertrochanteric fracture of the left hip and went for intramedullary nailing. Cultures came back from abscess for candida and she was switched to IV fluconazole and unasyn, and completed her course of antibiotics while admitted. Recovery was otherwise unremarkable. Abscess continued to heal well with wound care. Patient is to be discharged home with home PT services for rehabilitation.

## 2019-08-10 LAB
ALBUMIN SERPL ELPH-MCNC: 3.2 G/DL — LOW (ref 3.5–5.2)
ALP SERPL-CCNC: 109 U/L — SIGNIFICANT CHANGE UP (ref 30–115)
ALT FLD-CCNC: 14 U/L — SIGNIFICANT CHANGE UP (ref 0–41)
ANION GAP SERPL CALC-SCNC: 11 MMOL/L — SIGNIFICANT CHANGE UP (ref 7–14)
AST SERPL-CCNC: 24 U/L — SIGNIFICANT CHANGE UP (ref 0–41)
BILIRUB SERPL-MCNC: 0.3 MG/DL — SIGNIFICANT CHANGE UP (ref 0.2–1.2)
BLD GP AB SCN SERPL QL: SIGNIFICANT CHANGE UP
BUN SERPL-MCNC: 13 MG/DL — SIGNIFICANT CHANGE UP (ref 10–20)
CALCIUM SERPL-MCNC: 9.3 MG/DL — SIGNIFICANT CHANGE UP (ref 8.5–10.1)
CHLORIDE SERPL-SCNC: 103 MMOL/L — SIGNIFICANT CHANGE UP (ref 98–110)
CO2 SERPL-SCNC: 25 MMOL/L — SIGNIFICANT CHANGE UP (ref 17–32)
CREAT SERPL-MCNC: 0.7 MG/DL — SIGNIFICANT CHANGE UP (ref 0.7–1.5)
GLUCOSE BLDC GLUCOMTR-MCNC: 106 MG/DL — HIGH (ref 70–99)
GLUCOSE BLDC GLUCOMTR-MCNC: 111 MG/DL — HIGH (ref 70–99)
GLUCOSE BLDC GLUCOMTR-MCNC: 126 MG/DL — HIGH (ref 70–99)
GLUCOSE BLDC GLUCOMTR-MCNC: 86 MG/DL — SIGNIFICANT CHANGE UP (ref 70–99)
GLUCOSE SERPL-MCNC: 89 MG/DL — SIGNIFICANT CHANGE UP (ref 70–99)
HCT VFR BLD CALC: 24.3 % — LOW (ref 37–47)
HGB BLD-MCNC: 7.6 G/DL — LOW (ref 12–16)
MAGNESIUM SERPL-MCNC: 1.8 MG/DL — SIGNIFICANT CHANGE UP (ref 1.8–2.4)
MCHC RBC-ENTMCNC: 26.1 PG — LOW (ref 27–31)
MCHC RBC-ENTMCNC: 31.3 G/DL — LOW (ref 32–37)
MCV RBC AUTO: 83.5 FL — SIGNIFICANT CHANGE UP (ref 81–99)
NRBC # BLD: 0 /100 WBCS — SIGNIFICANT CHANGE UP (ref 0–0)
PLATELET # BLD AUTO: 321 K/UL — SIGNIFICANT CHANGE UP (ref 130–400)
POTASSIUM SERPL-MCNC: 4.2 MMOL/L — SIGNIFICANT CHANGE UP (ref 3.5–5)
POTASSIUM SERPL-SCNC: 4.2 MMOL/L — SIGNIFICANT CHANGE UP (ref 3.5–5)
PROT SERPL-MCNC: 6 G/DL — SIGNIFICANT CHANGE UP (ref 6–8)
RBC # BLD: 2.91 M/UL — LOW (ref 4.2–5.4)
RBC # FLD: 13.7 % — SIGNIFICANT CHANGE UP (ref 11.5–14.5)
SODIUM SERPL-SCNC: 139 MMOL/L — SIGNIFICANT CHANGE UP (ref 135–146)
WBC # BLD: 4.32 K/UL — LOW (ref 4.8–10.8)
WBC # FLD AUTO: 4.32 K/UL — LOW (ref 4.8–10.8)

## 2019-08-10 PROCEDURE — 99232 SBSQ HOSP IP/OBS MODERATE 35: CPT

## 2019-08-10 RX ADMIN — ATORVASTATIN CALCIUM 20 MILLIGRAM(S): 80 TABLET, FILM COATED ORAL at 21:34

## 2019-08-10 RX ADMIN — AMPICILLIN SODIUM AND SULBACTAM SODIUM 200 GRAM(S): 250; 125 INJECTION, POWDER, FOR SUSPENSION INTRAMUSCULAR; INTRAVENOUS at 05:38

## 2019-08-10 RX ADMIN — TRAMADOL HYDROCHLORIDE 50 MILLIGRAM(S): 50 TABLET ORAL at 23:28

## 2019-08-10 RX ADMIN — TRAMADOL HYDROCHLORIDE 50 MILLIGRAM(S): 50 TABLET ORAL at 10:08

## 2019-08-10 RX ADMIN — AMPICILLIN SODIUM AND SULBACTAM SODIUM 200 GRAM(S): 250; 125 INJECTION, POWDER, FOR SUSPENSION INTRAMUSCULAR; INTRAVENOUS at 01:18

## 2019-08-10 RX ADMIN — AMLODIPINE BESYLATE 5 MILLIGRAM(S): 2.5 TABLET ORAL at 05:37

## 2019-08-10 RX ADMIN — AMPICILLIN SODIUM AND SULBACTAM SODIUM 200 GRAM(S): 250; 125 INJECTION, POWDER, FOR SUSPENSION INTRAMUSCULAR; INTRAVENOUS at 14:19

## 2019-08-10 RX ADMIN — TRAMADOL HYDROCHLORIDE 50 MILLIGRAM(S): 50 TABLET ORAL at 14:32

## 2019-08-10 RX ADMIN — LOSARTAN POTASSIUM 50 MILLIGRAM(S): 100 TABLET, FILM COATED ORAL at 05:37

## 2019-08-10 RX ADMIN — INSULIN GLARGINE 15 UNIT(S): 100 INJECTION, SOLUTION SUBCUTANEOUS at 21:33

## 2019-08-10 RX ADMIN — Medication 100 MILLIGRAM(S): at 14:19

## 2019-08-10 RX ADMIN — Medication 650 MILLIGRAM(S): at 17:02

## 2019-08-10 RX ADMIN — Medication 81 MILLIGRAM(S): at 14:19

## 2019-08-10 RX ADMIN — TRAMADOL HYDROCHLORIDE 50 MILLIGRAM(S): 50 TABLET ORAL at 09:38

## 2019-08-10 RX ADMIN — TRAMADOL HYDROCHLORIDE 50 MILLIGRAM(S): 50 TABLET ORAL at 19:26

## 2019-08-10 RX ADMIN — HEPARIN SODIUM 5000 UNIT(S): 5000 INJECTION INTRAVENOUS; SUBCUTANEOUS at 17:07

## 2019-08-10 RX ADMIN — TRAMADOL HYDROCHLORIDE 50 MILLIGRAM(S): 50 TABLET ORAL at 23:00

## 2019-08-10 RX ADMIN — Medication 7 UNIT(S): at 12:19

## 2019-08-10 RX ADMIN — FLUCONAZOLE 100 MILLIGRAM(S): 150 TABLET ORAL at 01:19

## 2019-08-10 RX ADMIN — CHLORHEXIDINE GLUCONATE 1 APPLICATION(S): 213 SOLUTION TOPICAL at 05:38

## 2019-08-10 RX ADMIN — TRAMADOL HYDROCHLORIDE 50 MILLIGRAM(S): 50 TABLET ORAL at 18:56

## 2019-08-10 RX ADMIN — HEPARIN SODIUM 5000 UNIT(S): 5000 INJECTION INTRAVENOUS; SUBCUTANEOUS at 05:37

## 2019-08-10 RX ADMIN — Medication 650 MILLIGRAM(S): at 17:32

## 2019-08-10 RX ADMIN — TRAMADOL HYDROCHLORIDE 50 MILLIGRAM(S): 50 TABLET ORAL at 02:11

## 2019-08-10 RX ADMIN — TRAMADOL HYDROCHLORIDE 50 MILLIGRAM(S): 50 TABLET ORAL at 15:03

## 2019-08-10 NOTE — PROGRESS NOTE ADULT - ASSESSMENT
#. Intertrochantric fracture of the femur s/p IMN of the femur   - mechanical fall  - CBC stable, f/u cbc  - daily dressing changes  - rehab with OT 2-3 times/week  - for moderate pain: control with tramadol 50 mg po q4h prn and for severe pain: control with morphine 2mg IV q6h prn   - full weight bearing, AAT  - pending workers comp for 4A placement    #. R inguinal abscess   - s/p IV fluconazole  - s/p IV unsyn 3 g q6h  - s/p i&d  - culture grew candida  - ID following  - completed course of augmentin 875 BID and fluconazole 400 PO on 8/10     #. Anemia  - hemoglobin 7.6 today, down from 7.9 yesterday  - maintain active type and screen  - transfuse if hemoglobin drops below 7    #. Hyperglycemia/DMt2  - continue Lantus at 15 units, continue lispro to 7units as FSBG levels are decreased, so patient is stable on this regimen  - continue bg finger sticks qAC    #. HTN  - continue losartan 50 mg po daily  - added amlodipine 5    #. HLD  - continue atorvastatin 20 mg po qhs    #. viral cardiomyopathy  - follow up OP    Activity: AAT  diet: carb consistent  dvt ppx: heparin subq  gi ppx: not indicated  full code  dispo: from home, pending 4A

## 2019-08-10 NOTE — PROGRESS NOTE ADULT - SUBJECTIVE AND OBJECTIVE BOX
HPI  Patient is a 68y old Female who presents with a chief complaint of mechanical fall (10 Aug 2019 12:46)    Currently admitted to medicine with the primary diagnosis of Subtrochanteric fracture of left femur     Today is hospital day 9d.     INTERVAL HPI / OVERNIGHT EVENTS:  Patient was examined and seen at bedside. This morning she is resting comfortably in bed and reports no new issues or overnight events.     ROS: Otherwise unremarkable     PAST MEDICAL & SURGICAL HISTORY  No pertinent past medical history    ALLERGIES  No Known Allergies    MEDICATIONS  STANDING MEDICATIONS  amLODIPine   Tablet 5 milliGRAM(s) Oral daily  aspirin  chewable 81 milliGRAM(s) Oral daily  atorvastatin 20 milliGRAM(s) Oral at bedtime  chlorhexidine 4% Liquid 1 Application(s) Topical <User Schedule>  dextrose 5%. 1000 milliLiter(s) IV Continuous <Continuous>  dextrose 50% Injectable 12.5 Gram(s) IV Push once  dextrose 50% Injectable 25 Gram(s) IV Push once  dextrose 50% Injectable 25 Gram(s) IV Push once  docusate sodium 100 milliGRAM(s) Oral daily  heparin  Injectable 5000 Unit(s) SubCutaneous every 12 hours  insulin glargine Injectable (LANTUS) 15 Unit(s) SubCutaneous at bedtime  insulin lispro (HumaLOG) corrective regimen sliding scale   SubCutaneous three times a day before meals  insulin lispro Injectable (HumaLOG) 7 Unit(s) SubCutaneous three times a day before meals  losartan 50 milliGRAM(s) Oral daily    PRN MEDICATIONS  acetaminophen   Tablet .. 650 milliGRAM(s) Oral every 6 hours PRN  ALPRAZolam 0.25 milliGRAM(s) Oral three times a day PRN  dextrose 40% Gel 15 Gram(s) Oral once PRN  glucagon  Injectable 1 milliGRAM(s) IntraMuscular once PRN  morphine  - Injectable 2 milliGRAM(s) IV Push every 6 hours PRN  traMADol 50 milliGRAM(s) Oral every 4 hours PRN  zolpidem 5 milliGRAM(s) Oral at bedtime PRN    VITALS:  T(F): 97  HR: 81  BP: 146/66  RR: 17  SpO2: --    PHYSICAL EXAM  GEN: NAD, Resting comfortably in bed  PULM: Clear to auscultation bilaterally, No wheezes  CVS: Regular rate and rhythm, S1-S2, no murmurs  ABD: Soft, non-tender, non-distended, no guarding  EXT: No edema  NEURO: AAOx3, no focal deficits    LABS                        7.6    4.32  )-----------( 321      ( 10 Aug 2019 06:12 )             24.3     08-10    139  |  103  |  13  ----------------------------<  89  4.2   |  25  |  0.7    Ca    9.3      10 Aug 2019 06:12  Mg     1.8     08-10    TPro  6.0  /  Alb  3.2<L>  /  TBili  0.3  /  DBili  x   /  AST  24  /  ALT  14  /  AlkPhos  109  08-10                  RADIOLOGY HPI  69 y/o female w/ pmh of DM2 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  As per patient she was walking and tripped on her feet.  She fell and did not hit her head, did not loose consciousness and did not having any incontinence.  Her brother who is at bedside explains that for a long time he has noted that his sister, the patient has ambulatory issues. In the ED patient was complaining of Left hip pain and inability to ambulate. Also reports an abscess in her Right groin that she has been self-medicating with Amoxicillin 850mg X 8 day prior to fall.  Although the patient mentions she has no primary doctor she does explain that she has seen eugenia chapa in the past.  She does not recall any visits to the doctor however within the last 10 years.   Self medicating with Furosemide occasionally when she feels bloated. Reports occasional gait issues with bilateral leg "stiffness and imbalance  Denies any fever, chills, nausea, vomiting, headache, abd pain, chest pain, lower extremity swelling.  No recent sick contacts or decrease in po intake.      Currently admitted to medicine with the primary diagnosis of Subtrochanteric fracture of left femur     Today is hospital day 9d.     INTERVAL HPI / OVERNIGHT EVENTS:  Patient was examined and seen at bedside. This morning she is resting comfortably in bed and reports no new issues or overnight events.     ROS: Otherwise unremarkable     PAST MEDICAL & SURGICAL HISTORY  No pertinent past medical history    ALLERGIES  No Known Allergies    MEDICATIONS  STANDING MEDICATIONS  amLODIPine   Tablet 5 milliGRAM(s) Oral daily  aspirin  chewable 81 milliGRAM(s) Oral daily  atorvastatin 20 milliGRAM(s) Oral at bedtime  chlorhexidine 4% Liquid 1 Application(s) Topical <User Schedule>  dextrose 5%. 1000 milliLiter(s) IV Continuous <Continuous>  dextrose 50% Injectable 12.5 Gram(s) IV Push once  dextrose 50% Injectable 25 Gram(s) IV Push once  dextrose 50% Injectable 25 Gram(s) IV Push once  docusate sodium 100 milliGRAM(s) Oral daily  heparin  Injectable 5000 Unit(s) SubCutaneous every 12 hours  insulin glargine Injectable (LANTUS) 15 Unit(s) SubCutaneous at bedtime  insulin lispro (HumaLOG) corrective regimen sliding scale   SubCutaneous three times a day before meals  insulin lispro Injectable (HumaLOG) 7 Unit(s) SubCutaneous three times a day before meals  losartan 50 milliGRAM(s) Oral daily    PRN MEDICATIONS  acetaminophen   Tablet .. 650 milliGRAM(s) Oral every 6 hours PRN  ALPRAZolam 0.25 milliGRAM(s) Oral three times a day PRN  dextrose 40% Gel 15 Gram(s) Oral once PRN  glucagon  Injectable 1 milliGRAM(s) IntraMuscular once PRN  morphine  - Injectable 2 milliGRAM(s) IV Push every 6 hours PRN  traMADol 50 milliGRAM(s) Oral every 4 hours PRN  zolpidem 5 milliGRAM(s) Oral at bedtime PRN    VITALS:  T(F): 97  HR: 81  BP: 146/66  RR: 17  SpO2: --    PHYSICAL EXAM  GEN: NAD, Resting comfortably in bed  PULM: Clear to auscultation bilaterally, No wheezes  CVS: Regular rate and rhythm, S1-S2, no murmurs  ABD: Soft, non-tender, non-distended, no guarding  EXT: No edema  NEURO: AAOx3, no focal deficits    LABS                        7.6    4.32  )-----------( 321      ( 10 Aug 2019 06:12 )             24.3     08-10    139  |  103  |  13  ----------------------------<  89  4.2   |  25  |  0.7    Ca    9.3      10 Aug 2019 06:12  Mg     1.8     08-10    TPro  6.0  /  Alb  3.2<L>  /  TBili  0.3  /  DBili  x   /  AST  24  /  ALT  14  /  AlkPhos  109  08-10                  RADIOLOGY HPI  67 y/o female w/ pmh of DM2 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  As per patient she was walking and tripped on her feet.  She fell and did not hit her head, did not loose consciousness and did not having any incontinence.  Her brother who is at bedside explains that for a long time he has noted that his sister, the patient has ambulatory issues. In the ED patient was complaining of Left hip pain and inability to ambulate. Also reports an abscess in her Right groin that she has been self-medicating with Amoxicillin 850mg X 8 day prior to fall.  Although the patient mentions she has no primary doctor she does explain that she has seen eugenia chapa in the past.  She does not recall any visits to the doctor however within the last 10 years.   Self medicating with Furosemide occasionally when she feels bloated. Reports occasional gait issues with bilateral leg "stiffness and imbalance  Denies any fever, chills, nausea, vomiting, headache, abd pain, chest pain, lower extremity swelling.  No recent sick contacts or decrease in po intake.      Currently admitted to medicine with the primary diagnosis of Subtrochanteric fracture of left femur     Today is hospital day 9d.     INTERVAL HPI / OVERNIGHT EVENTS:  Patient was examined and seen at bedside. This morning she is resting comfortably in bed and reports no new issues or overnight events. She reports she still has a bit of pain but that it is improved compared to before.    ROS: Otherwise unremarkable     PAST MEDICAL & SURGICAL HISTORY  No pertinent past medical history    ALLERGIES  No Known Allergies    MEDICATIONS  STANDING MEDICATIONS  amLODIPine   Tablet 5 milliGRAM(s) Oral daily  aspirin  chewable 81 milliGRAM(s) Oral daily  atorvastatin 20 milliGRAM(s) Oral at bedtime  chlorhexidine 4% Liquid 1 Application(s) Topical <User Schedule>  dextrose 5%. 1000 milliLiter(s) IV Continuous <Continuous>  dextrose 50% Injectable 12.5 Gram(s) IV Push once  dextrose 50% Injectable 25 Gram(s) IV Push once  dextrose 50% Injectable 25 Gram(s) IV Push once  docusate sodium 100 milliGRAM(s) Oral daily  heparin  Injectable 5000 Unit(s) SubCutaneous every 12 hours  insulin glargine Injectable (LANTUS) 15 Unit(s) SubCutaneous at bedtime  insulin lispro (HumaLOG) corrective regimen sliding scale   SubCutaneous three times a day before meals  insulin lispro Injectable (HumaLOG) 7 Unit(s) SubCutaneous three times a day before meals  losartan 50 milliGRAM(s) Oral daily    PRN MEDICATIONS  acetaminophen   Tablet .. 650 milliGRAM(s) Oral every 6 hours PRN  ALPRAZolam 0.25 milliGRAM(s) Oral three times a day PRN  dextrose 40% Gel 15 Gram(s) Oral once PRN  glucagon  Injectable 1 milliGRAM(s) IntraMuscular once PRN  morphine  - Injectable 2 milliGRAM(s) IV Push every 6 hours PRN  traMADol 50 milliGRAM(s) Oral every 4 hours PRN  zolpidem 5 milliGRAM(s) Oral at bedtime PRN    VITALS:  T(F): 97  HR: 81  BP: 146/66  RR: 17  SpO2: --    PHYSICAL EXAM  GEN: NAD, Resting comfortably in bed  PULM: Clear to auscultation bilaterally, No wheezes  CVS: Regular rate and rhythm, S1-S2, no murmurs  ABD: Soft, non-tender, non-distended, no guarding  EXT: No edema  NEURO: AAOx3, no focal deficits    LABS                        7.6    4.32  )-----------( 321      ( 10 Aug 2019 06:12 )             24.3     08-10    139  |  103  |  13  ----------------------------<  89  4.2   |  25  |  0.7    Ca    9.3      10 Aug 2019 06:12  Mg     1.8     08-10    TPro  6.0  /  Alb  3.2<L>  /  TBili  0.3  /  DBili  x   /  AST  24  /  ALT  14  /  AlkPhos  109  08-10                  RADIOLOGY

## 2019-08-10 NOTE — PROGRESS NOTE ADULT - SUBJECTIVE AND OBJECTIVE BOX
GUS WILBURN  68y Female    INTERVAL HPI/OVERNIGHT EVENTS:    No complaints except for some pain of left hip. No fever. Feels well overall.    T(F): 97.4 (08-10-19 @ 07:46), Max: 97.4 (08-09-19 @ 16:04)  HR: 80 (08-10-19 @ 07:46) (80 - 96)  BP: 149/68 (08-10-19 @ 07:46) (134/61 - 152/67)  RR: 19 (08-10-19 @ 07:46) (18 - 19)  SpO2: --  I&O's Summary    CAPILLARY BLOOD GLUCOSE      POCT Blood Glucose.: 111 mg/dL (10 Aug 2019 11:27)  POCT Blood Glucose.: 86 mg/dL (10 Aug 2019 08:02)  POCT Blood Glucose.: 119 mg/dL (09 Aug 2019 21:11)  POCT Blood Glucose.: 78 mg/dL (09 Aug 2019 17:23)        PHYSICAL EXAM:  GENERAL: NAD  HEAD:  Normocephalic  EYES:  conjunctiva and sclera clear  ENMT: Moist mucous membranes  NECK: Supple  NERVOUS SYSTEM:  Alert & Oriented X3, Good concentration  CHEST/LUNG: Clear to percussion bilaterally; No rales, rhonchi, wheezing  HEART: Regular rate and rhythm; No murmurs  ABDOMEN: Soft, Nontender, Nondistended  EXTREMITIES:   Left thigh with staples, no erythema or bleeding      Consultant(s) Notes Reviewed:  [x ] YES  [ ] NO  Care Discussed with Consultants/Other Providers [ x] YES  [ ] NO    MEDICATIONS  (STANDING):  amLODIPine   Tablet 5 milliGRAM(s) Oral daily  ampicillin/sulbactam  IVPB 3 Gram(s) IV Intermittent every 6 hours  aspirin  chewable 81 milliGRAM(s) Oral daily  atorvastatin 20 milliGRAM(s) Oral at bedtime  chlorhexidine 4% Liquid 1 Application(s) Topical <User Schedule>  dextrose 5%. 1000 milliLiter(s) (50 mL/Hr) IV Continuous <Continuous>  dextrose 50% Injectable 12.5 Gram(s) IV Push once  dextrose 50% Injectable 25 Gram(s) IV Push once  dextrose 50% Injectable 25 Gram(s) IV Push once  docusate sodium 100 milliGRAM(s) Oral daily  heparin  Injectable 5000 Unit(s) SubCutaneous every 12 hours  insulin glargine Injectable (LANTUS) 15 Unit(s) SubCutaneous at bedtime  insulin lispro (HumaLOG) corrective regimen sliding scale   SubCutaneous three times a day before meals  insulin lispro Injectable (HumaLOG) 7 Unit(s) SubCutaneous three times a day before meals  losartan 50 milliGRAM(s) Oral daily    MEDICATIONS  (PRN):  acetaminophen   Tablet .. 650 milliGRAM(s) Oral every 6 hours PRN Mild Pain (1 - 3)  ALPRAZolam 0.25 milliGRAM(s) Oral three times a day PRN anxiety  dextrose 40% Gel 15 Gram(s) Oral once PRN Blood Glucose LESS THAN 70 milliGRAM(s)/deciliter  glucagon  Injectable 1 milliGRAM(s) IntraMuscular once PRN Glucose LESS THAN 70 milligrams/deciliter  morphine  - Injectable 2 milliGRAM(s) IV Push every 6 hours PRN Severe Pain (7 - 10)  traMADol 50 milliGRAM(s) Oral every 4 hours PRN Moderate Pain (4 - 6)  zolpidem 5 milliGRAM(s) Oral at bedtime PRN Insomnia      LABS:                        7.6    4.32  )-----------( 321      ( 10 Aug 2019 06:12 )             24.3     08-10    139  |  103  |  13  ----------------------------<  89  4.2   |  25  |  0.7    Ca    9.3      10 Aug 2019 06:12  Mg     1.8     08-10    TPro  6.0  /  Alb  3.2<L>  /  TBili  0.3  /  DBili  x   /  AST  24  /  ALT  14  /  AlkPhos  109  08-10              Case discussed with resident    Care discussed with pt

## 2019-08-10 NOTE — PROGRESS NOTE ADULT - ASSESSMENT
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  The patient was also evaluated and treated for a right labial abscess likely related to uncontrolled diabetes.    Fall complicated by left hip fracture: status post ORIF. Continue Tramadol PRN for pain.  Post-operative acute blood loss anemia decreased to 7.6.    Repeat CBC in AM    WBAT to the LLE.    Right Labial Abscess: status post incision and drainage.  Wound cultures were positive for Candida Albicans.  Her wound has almost completely healed.  Complete treatment with Unasyn and Diflucan 400mg PO q24 today.  LFTs are stable.    Viral cardiomyopathy/moderate AS: repeat echocardiogram demonstrates normal ejection fraction with moderate AS.  Follow-up with cardiology as outpatient.    Uncontrolled DMII: FS have stabilized from admission.  Switch to Metformin 500mg PO q12 and Januvia 50mg PO q24 on discharge  controlled on insulin for now    Hypertension: continue Losartan 50mg q24    DVT prophylaxis      #Progress Note Handoff    Pending: CBC in AM, transfuse for Hgb < 7    Future Disposition: Awaiting worker's compensation for 4A transfer

## 2019-08-11 LAB
ALBUMIN SERPL ELPH-MCNC: 3.4 G/DL — LOW (ref 3.5–5.2)
ALP SERPL-CCNC: 110 U/L — SIGNIFICANT CHANGE UP (ref 30–115)
ALT FLD-CCNC: 13 U/L — SIGNIFICANT CHANGE UP (ref 0–41)
ANION GAP SERPL CALC-SCNC: 13 MMOL/L — SIGNIFICANT CHANGE UP (ref 7–14)
AST SERPL-CCNC: 21 U/L — SIGNIFICANT CHANGE UP (ref 0–41)
BILIRUB SERPL-MCNC: 0.2 MG/DL — SIGNIFICANT CHANGE UP (ref 0.2–1.2)
BUN SERPL-MCNC: 16 MG/DL — SIGNIFICANT CHANGE UP (ref 10–20)
CALCIUM SERPL-MCNC: 9.8 MG/DL — SIGNIFICANT CHANGE UP (ref 8.5–10.1)
CHLORIDE SERPL-SCNC: 104 MMOL/L — SIGNIFICANT CHANGE UP (ref 98–110)
CO2 SERPL-SCNC: 23 MMOL/L — SIGNIFICANT CHANGE UP (ref 17–32)
CREAT SERPL-MCNC: 0.7 MG/DL — SIGNIFICANT CHANGE UP (ref 0.7–1.5)
GLUCOSE BLDC GLUCOMTR-MCNC: 141 MG/DL — HIGH (ref 70–99)
GLUCOSE BLDC GLUCOMTR-MCNC: 165 MG/DL — HIGH (ref 70–99)
GLUCOSE BLDC GLUCOMTR-MCNC: 76 MG/DL — SIGNIFICANT CHANGE UP (ref 70–99)
GLUCOSE BLDC GLUCOMTR-MCNC: 86 MG/DL — SIGNIFICANT CHANGE UP (ref 70–99)
GLUCOSE SERPL-MCNC: 95 MG/DL — SIGNIFICANT CHANGE UP (ref 70–99)
HCT VFR BLD CALC: 24.9 % — LOW (ref 37–47)
HGB BLD-MCNC: 7.7 G/DL — LOW (ref 12–16)
MAGNESIUM SERPL-MCNC: 1.9 MG/DL — SIGNIFICANT CHANGE UP (ref 1.8–2.4)
MCHC RBC-ENTMCNC: 26.3 PG — LOW (ref 27–31)
MCHC RBC-ENTMCNC: 30.9 G/DL — LOW (ref 32–37)
MCV RBC AUTO: 85 FL — SIGNIFICANT CHANGE UP (ref 81–99)
NRBC # BLD: 0 /100 WBCS — SIGNIFICANT CHANGE UP (ref 0–0)
PLATELET # BLD AUTO: 334 K/UL — SIGNIFICANT CHANGE UP (ref 130–400)
POTASSIUM SERPL-MCNC: 4.4 MMOL/L — SIGNIFICANT CHANGE UP (ref 3.5–5)
POTASSIUM SERPL-SCNC: 4.4 MMOL/L — SIGNIFICANT CHANGE UP (ref 3.5–5)
PROT SERPL-MCNC: 6.2 G/DL — SIGNIFICANT CHANGE UP (ref 6–8)
RBC # BLD: 2.93 M/UL — LOW (ref 4.2–5.4)
RBC # FLD: 14 % — SIGNIFICANT CHANGE UP (ref 11.5–14.5)
SODIUM SERPL-SCNC: 140 MMOL/L — SIGNIFICANT CHANGE UP (ref 135–146)
WBC # BLD: 4.42 K/UL — LOW (ref 4.8–10.8)
WBC # FLD AUTO: 4.42 K/UL — LOW (ref 4.8–10.8)

## 2019-08-11 PROCEDURE — 99232 SBSQ HOSP IP/OBS MODERATE 35: CPT

## 2019-08-11 RX ORDER — TRAMADOL HYDROCHLORIDE 50 MG/1
50 TABLET ORAL EVERY 4 HOURS
Refills: 0 | Status: DISCONTINUED | OUTPATIENT
Start: 2019-08-11 | End: 2019-08-15

## 2019-08-11 RX ADMIN — HEPARIN SODIUM 5000 UNIT(S): 5000 INJECTION INTRAVENOUS; SUBCUTANEOUS at 17:36

## 2019-08-11 RX ADMIN — Medication 81 MILLIGRAM(S): at 12:20

## 2019-08-11 RX ADMIN — INSULIN GLARGINE 15 UNIT(S): 100 INJECTION, SOLUTION SUBCUTANEOUS at 21:05

## 2019-08-11 RX ADMIN — CHLORHEXIDINE GLUCONATE 1 APPLICATION(S): 213 SOLUTION TOPICAL at 05:55

## 2019-08-11 RX ADMIN — ATORVASTATIN CALCIUM 20 MILLIGRAM(S): 80 TABLET, FILM COATED ORAL at 21:06

## 2019-08-11 RX ADMIN — AMLODIPINE BESYLATE 5 MILLIGRAM(S): 2.5 TABLET ORAL at 05:54

## 2019-08-11 RX ADMIN — TRAMADOL HYDROCHLORIDE 50 MILLIGRAM(S): 50 TABLET ORAL at 21:35

## 2019-08-11 RX ADMIN — Medication 7 UNIT(S): at 12:20

## 2019-08-11 RX ADMIN — TRAMADOL HYDROCHLORIDE 50 MILLIGRAM(S): 50 TABLET ORAL at 14:39

## 2019-08-11 RX ADMIN — Medication 650 MILLIGRAM(S): at 10:32

## 2019-08-11 RX ADMIN — TRAMADOL HYDROCHLORIDE 50 MILLIGRAM(S): 50 TABLET ORAL at 21:05

## 2019-08-11 RX ADMIN — Medication 100 MILLIGRAM(S): at 12:20

## 2019-08-11 RX ADMIN — TRAMADOL HYDROCHLORIDE 50 MILLIGRAM(S): 50 TABLET ORAL at 15:09

## 2019-08-11 RX ADMIN — HEPARIN SODIUM 5000 UNIT(S): 5000 INJECTION INTRAVENOUS; SUBCUTANEOUS at 05:55

## 2019-08-11 RX ADMIN — Medication 650 MILLIGRAM(S): at 11:02

## 2019-08-11 RX ADMIN — LOSARTAN POTASSIUM 50 MILLIGRAM(S): 100 TABLET, FILM COATED ORAL at 05:54

## 2019-08-11 RX ADMIN — TRAMADOL HYDROCHLORIDE 50 MILLIGRAM(S): 50 TABLET ORAL at 09:08

## 2019-08-11 RX ADMIN — TRAMADOL HYDROCHLORIDE 50 MILLIGRAM(S): 50 TABLET ORAL at 09:38

## 2019-08-11 NOTE — PROGRESS NOTE ADULT - SUBJECTIVE AND OBJECTIVE BOX
GUS WILBURN  68y Female    INTERVAL HPI/OVERNIGHT EVENTS:    No complaints. Feels well.     T(F): 97.8 (08-11-19 @ 07:31), Max: 97.8 (08-11-19 @ 07:31)  HR: 82 (08-11-19 @ 07:31) (64 - 82)  BP: 152/67 (08-11-19 @ 07:31) (133/69 - 152/67)  RR: 18 (08-11-19 @ 07:31) (17 - 18)  SpO2: --  I&O's Summary    11 Aug 2019 07:01  -  11 Aug 2019 13:59  --------------------------------------------------------  IN: 120 mL / OUT: 200 mL / NET: -80 mL      CAPILLARY BLOOD GLUCOSE      POCT Blood Glucose.: 141 mg/dL (11 Aug 2019 12:03)  POCT Blood Glucose.: 86 mg/dL (11 Aug 2019 08:15)  POCT Blood Glucose.: 126 mg/dL (10 Aug 2019 21:26)  POCT Blood Glucose.: 106 mg/dL (10 Aug 2019 16:55)        PHYSICAL EXAM:  GENERAL: NAD  HEAD:  Normocephalic  EYES:  conjunctiva and sclera clear  ENMT: Moist mucous membranes  NECK: Supple  NERVOUS SYSTEM:  Alert & Oriented X3, Good concentration  CHEST/LUNG: Clear to percussion bilaterally; No rales, rhonchi, wheezing  HEART: Regular rate and rhythm; No murmurs  ABDOMEN: Soft, Nontender, Nondistended  EXTREMITIES:   No edema  Left thigh with staples and small hematoma, no drainage      Consultant(s) Notes Reviewed:  [x ] YES  [ ] NO  Care Discussed with Consultants/Other Providers [ x] YES  [ ] NO    MEDICATIONS  (STANDING):  amLODIPine   Tablet 5 milliGRAM(s) Oral daily  aspirin  chewable 81 milliGRAM(s) Oral daily  atorvastatin 20 milliGRAM(s) Oral at bedtime  chlorhexidine 4% Liquid 1 Application(s) Topical <User Schedule>  dextrose 5%. 1000 milliLiter(s) (50 mL/Hr) IV Continuous <Continuous>  dextrose 50% Injectable 12.5 Gram(s) IV Push once  dextrose 50% Injectable 25 Gram(s) IV Push once  dextrose 50% Injectable 25 Gram(s) IV Push once  docusate sodium 100 milliGRAM(s) Oral daily  heparin  Injectable 5000 Unit(s) SubCutaneous every 12 hours  insulin glargine Injectable (LANTUS) 15 Unit(s) SubCutaneous at bedtime  insulin lispro (HumaLOG) corrective regimen sliding scale   SubCutaneous three times a day before meals  insulin lispro Injectable (HumaLOG) 7 Unit(s) SubCutaneous three times a day before meals  losartan 50 milliGRAM(s) Oral daily    MEDICATIONS  (PRN):  acetaminophen   Tablet .. 650 milliGRAM(s) Oral every 6 hours PRN Mild Pain (1 - 3)  dextrose 40% Gel 15 Gram(s) Oral once PRN Blood Glucose LESS THAN 70 milliGRAM(s)/deciliter  glucagon  Injectable 1 milliGRAM(s) IntraMuscular once PRN Glucose LESS THAN 70 milligrams/deciliter  traMADol 50 milliGRAM(s) Oral every 4 hours PRN Moderate Pain (4 - 6)      LABS:                        7.7    4.42  )-----------( 334      ( 11 Aug 2019 05:31 )             24.9     08-11    140  |  104  |  16  ----------------------------<  95  4.4   |  23  |  0.7    Ca    9.8      11 Aug 2019 05:31  Mg     1.9     08-11    TPro  6.2  /  Alb  3.4<L>  /  TBili  0.2  /  DBili  x   /  AST  21  /  ALT  13  /  AlkPhos  110  08-11            Care discussed with pt/family

## 2019-08-11 NOTE — PROGRESS NOTE ADULT - ASSESSMENT
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  The patient was also evaluated and treated for a right labial abscess likely related to uncontrolled diabetes.    Fall complicated by left hip fracture: status post ORIF. Continue Tramadol PRN for pain.  Post-operative acute blood loss anemia decreased to 7.6 and is now stable   WBAT to the LLE.    Right Labial Abscess: status post incision and drainage.  Wound cultures were positive for Candida Albicans.  Her wound has almost completely healed.  s/p treatment with Unasyn and Diflucan 400mg PO q24 today.  LFTs are stable.    Viral cardiomyopathy/moderate AS: repeat echocardiogram demonstrates normal ejection fraction with moderate AS.  Follow-up with cardiology as outpatient.    Uncontrolled DMII: FS have stabilized from admission.  Switch to Metformin 500mg PO q12 and Januvia 50mg PO q24 on discharge  controlled on insulin for now    Hypertension: continue Losartan 50mg q24    DVT prophylaxis      #Progress Note Handoff    Pending: CBC in AM, transfuse for Hgb < 7    Future Disposition: Awaiting worker's compensation for 4A transfer

## 2019-08-12 LAB
CULTURE RESULTS: SIGNIFICANT CHANGE UP
GLUCOSE BLDC GLUCOMTR-MCNC: 120 MG/DL — HIGH (ref 70–99)
GLUCOSE BLDC GLUCOMTR-MCNC: 153 MG/DL — HIGH (ref 70–99)
HCT VFR BLD CALC: 26.8 % — LOW (ref 37–47)
HGB BLD-MCNC: 8.3 G/DL — LOW (ref 12–16)
MCHC RBC-ENTMCNC: 26 PG — LOW (ref 27–31)
MCHC RBC-ENTMCNC: 31 G/DL — LOW (ref 32–37)
MCV RBC AUTO: 84 FL — SIGNIFICANT CHANGE UP (ref 81–99)
NRBC # BLD: 0 /100 WBCS — SIGNIFICANT CHANGE UP (ref 0–0)
PLATELET # BLD AUTO: 339 K/UL — SIGNIFICANT CHANGE UP (ref 130–400)
RBC # BLD: 3.19 M/UL — LOW (ref 4.2–5.4)
RBC # FLD: 14.3 % — SIGNIFICANT CHANGE UP (ref 11.5–14.5)
SPECIMEN SOURCE: SIGNIFICANT CHANGE UP
WBC # BLD: 4.46 K/UL — LOW (ref 4.8–10.8)
WBC # FLD AUTO: 4.46 K/UL — LOW (ref 4.8–10.8)

## 2019-08-12 PROCEDURE — 99232 SBSQ HOSP IP/OBS MODERATE 35: CPT

## 2019-08-12 RX ORDER — LANOLIN ALCOHOL/MO/W.PET/CERES
1 CREAM (GRAM) TOPICAL AT BEDTIME
Refills: 0 | Status: COMPLETED | OUTPATIENT
Start: 2019-08-12 | End: 2019-08-12

## 2019-08-12 RX ORDER — METFORMIN HYDROCHLORIDE 850 MG/1
500 TABLET ORAL EVERY 12 HOURS
Refills: 0 | Status: DISCONTINUED | OUTPATIENT
Start: 2019-08-12 | End: 2019-08-15

## 2019-08-12 RX ADMIN — TRAMADOL HYDROCHLORIDE 50 MILLIGRAM(S): 50 TABLET ORAL at 12:31

## 2019-08-12 RX ADMIN — TRAMADOL HYDROCHLORIDE 50 MILLIGRAM(S): 50 TABLET ORAL at 22:00

## 2019-08-12 RX ADMIN — HEPARIN SODIUM 5000 UNIT(S): 5000 INJECTION INTRAVENOUS; SUBCUTANEOUS at 18:15

## 2019-08-12 RX ADMIN — TRAMADOL HYDROCHLORIDE 50 MILLIGRAM(S): 50 TABLET ORAL at 21:34

## 2019-08-12 RX ADMIN — Medication 81 MILLIGRAM(S): at 12:32

## 2019-08-12 RX ADMIN — LOSARTAN POTASSIUM 50 MILLIGRAM(S): 100 TABLET, FILM COATED ORAL at 05:03

## 2019-08-12 RX ADMIN — Medication 100 MILLIGRAM(S): at 12:31

## 2019-08-12 RX ADMIN — Medication 7 UNIT(S): at 08:29

## 2019-08-12 RX ADMIN — Medication 650 MILLIGRAM(S): at 10:30

## 2019-08-12 RX ADMIN — HEPARIN SODIUM 5000 UNIT(S): 5000 INJECTION INTRAVENOUS; SUBCUTANEOUS at 05:03

## 2019-08-12 RX ADMIN — TRAMADOL HYDROCHLORIDE 50 MILLIGRAM(S): 50 TABLET ORAL at 07:29

## 2019-08-12 RX ADMIN — Medication 1 MILLIGRAM(S): at 22:04

## 2019-08-12 RX ADMIN — METFORMIN HYDROCHLORIDE 500 MILLIGRAM(S): 850 TABLET ORAL at 18:15

## 2019-08-12 RX ADMIN — TRAMADOL HYDROCHLORIDE 50 MILLIGRAM(S): 50 TABLET ORAL at 08:30

## 2019-08-12 RX ADMIN — Medication 650 MILLIGRAM(S): at 09:22

## 2019-08-12 RX ADMIN — AMLODIPINE BESYLATE 5 MILLIGRAM(S): 2.5 TABLET ORAL at 05:03

## 2019-08-12 RX ADMIN — ATORVASTATIN CALCIUM 20 MILLIGRAM(S): 80 TABLET, FILM COATED ORAL at 21:33

## 2019-08-12 NOTE — PROGRESS NOTE ADULT - ASSESSMENT
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  The patient was also evaluated and treated for a right labial abscess likely related to uncontrolled diabetes.    Fall complicated by left hip fracture: status post ORIF. Continue Tramadol PRN for pain.  Post-operative acute blood loss anemia noted; Hgb stabilized @ 8.3. WBAT to the LLE.  Right Labial Abscess: status post incision and drainage.  Wound cultures were positive for Candida Albicans.  Status post treatment with Augmentin and Diflucan.  Viral cardiomyopathy/moderate AS: repeat echocardiogram demonstrates normal ejection fraction with moderate AS.  Follow-up with cardiology as outpatient.  Uncontrolled DMII: FS have stabilized from admission.  Pt was started on Metformin 500mg PO q12.  Add Januvia 50mg PO q24 to home medications (non-formulary).  Hypertension: continue Losartan 50mg q24  GI/DVT prophylaxis    #Progress Note Handoff    Pending:    Future Disposition: Awaiting workers compensation for 4A transfer vs. home with services

## 2019-08-12 NOTE — PROGRESS NOTE ADULT - SUBJECTIVE AND OBJECTIVE BOX
HPI  Patient is a 68y old Female who presents with a chief complaint of mechanical fall (11 Aug 2019 13:59)    Currently admitted to medicine with the primary diagnosis of Subtrochanteric fracture of left femur     Today is hospital day 11d.     INTERVAL HPI / OVERNIGHT EVENTS:  Patient was examined and seen at bedside. This morning she is resting comfortably in bed and reports no new issues or overnight events. She reports feeling well overall, but doubts worker's comp will come through today based on her past experience.    ROS: Otherwise unremarkable     PAST MEDICAL & SURGICAL HISTORY  No pertinent past medical history    ALLERGIES  No Known Allergies    MEDICATIONS  STANDING MEDICATIONS  amLODIPine   Tablet 5 milliGRAM(s) Oral daily  aspirin  chewable 81 milliGRAM(s) Oral daily  atorvastatin 20 milliGRAM(s) Oral at bedtime  chlorhexidine 4% Liquid 1 Application(s) Topical <User Schedule>  dextrose 5%. 1000 milliLiter(s) IV Continuous <Continuous>  dextrose 50% Injectable 25 Gram(s) IV Push once  dextrose 50% Injectable 25 Gram(s) IV Push once  docusate sodium 100 milliGRAM(s) Oral daily  heparin  Injectable 5000 Unit(s) SubCutaneous every 12 hours  losartan 50 milliGRAM(s) Oral daily  metFORMIN 500 milliGRAM(s) Oral every 12 hours    PRN MEDICATIONS  acetaminophen   Tablet .. 650 milliGRAM(s) Oral every 6 hours PRN  glucagon  Injectable 1 milliGRAM(s) IntraMuscular once PRN  traMADol 50 milliGRAM(s) Oral every 4 hours PRN    VITALS:  T(F): 98.6  HR: 84  BP: 135/63  RR: 18  SpO2: --    PHYSICAL EXAM  GEN: NAD, Resting comfortably in bed  PULM: Clear to auscultation bilaterally, No wheezes  CVS: Regular rate and rhythm, S1-S2, no murmurs  ABD: Soft, non-tender, non-distended, no guarding  EXT: No edema  NEURO: AAOx3, no focal deficits    LABS                        8.3    4.46  )-----------( 339      ( 12 Aug 2019 06:47 )             26.8     08-11    140  |  104  |  16  ----------------------------<  95  4.4   |  23  |  0.7    Ca    9.8      11 Aug 2019 05:31  Mg     1.9     08-11    TPro  6.2  /  Alb  3.4<L>  /  TBili  0.2  /  DBili  x   /  AST  21  /  ALT  13  /  AlkPhos  110  08-11

## 2019-08-12 NOTE — PROGRESS NOTE ADULT - SUBJECTIVE AND OBJECTIVE BOX
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  The patient was also evaluated and treated for a right labial abscess likely related to uncontrolled diabetes.  Status post ORIF    Overnight events/Updates: The patient has been walking independently with use of walker, and even performed some stair climbing with the assistance of physical therapy.  We are still awaiting approval from Worker's Compensation.    Vital Signs Last 24 Hrs  T(C): 37 (12 Aug 2019 07:30), Max: 37 (12 Aug 2019 07:30)  T(F): 98.6 (12 Aug 2019 07:30), Max: 98.6 (12 Aug 2019 07:30)  HR: 84 (12 Aug 2019 09:45) (54 - 86)  BP: 135/63 (12 Aug 2019 09:45) (135/63 - 161/70)  BP(mean): --  RR: 18 (12 Aug 2019 07:30) (17 - 18)  SpO2: --    Physical Examination:  General: AAO x 3  HEENT: PERRLA, EOMI  CV= S1 & S2 appreciated  Lungs= CTA BL  Abdominal Examination= + BS, Soft, NT/ND  Extremity Examination= No C/C/E    ROS: No chest pain, no shortness of breath.  All other systems reviewed and are within normal limits except for the complaints in the HPI.    MEDICATIONS  (STANDING):  amLODIPine   Tablet 5 milliGRAM(s) Oral daily  aspirin  chewable 81 milliGRAM(s) Oral daily  atorvastatin 20 milliGRAM(s) Oral at bedtime  chlorhexidine 4% Liquid 1 Application(s) Topical <User Schedule>  dextrose 5%. 1000 milliLiter(s) (50 mL/Hr) IV Continuous <Continuous>  dextrose 50% Injectable 25 Gram(s) IV Push once  dextrose 50% Injectable 25 Gram(s) IV Push once  docusate sodium 100 milliGRAM(s) Oral daily  heparin  Injectable 5000 Unit(s) SubCutaneous every 12 hours  losartan 50 milliGRAM(s) Oral daily  metFORMIN 500 milliGRAM(s) Oral every 12 hours    MEDICATIONS  (PRN):  acetaminophen   Tablet .. 650 milliGRAM(s) Oral every 6 hours PRN Mild Pain (1 - 3)  glucagon  Injectable 1 milliGRAM(s) IntraMuscular once PRN Glucose LESS THAN 70 milligrams/deciliter  traMADol 50 milliGRAM(s) Oral every 4 hours PRN Moderate Pain (4 - 6)                            8.3    4.46  )-----------( 339      ( 12 Aug 2019 06:47 )             26.8     08-11    140  |  104  |  16  ----------------------------<  95  4.4   |  23  |  0.7    Ca    9.8      11 Aug 2019 05:31  Mg     1.9     08-11    TPro  6.2  /  Alb  3.4<L>  /  TBili  0.2  /  DBili  x   /  AST  21  /  ALT  13  /  AlkPhos  110  08-11      Case discussed with housestaff & family  MILTON Steiner [id number]

## 2019-08-12 NOTE — PROGRESS NOTE ADULT - ASSESSMENT
#. Intertrochantric fracture of the femur s/p IMN of the femur   - mechanical fall  - CBC stable, f/u cbc  - daily dressing changes  - rehab with OT 2-3 times/week  - for moderate pain: control with tramadol 50 mg po q4h prn and for severe pain: control with morphine 2mg IV q6h prn   - full weight bearing, AAT  - pending workers comp for 4A placement    #. R inguinal abscess   - s/p IV fluconazole  - s/p IV unsyn 3 g q6h  - s/p i&d  - culture grew candida  - ID following  - completed course of augmentin 875 BID and fluconazole 400 PO on 8/10     #. Anemia  - hemoglobin 8.3 today, up from 7.7 yesterday  - maintain active type and screen  - transfuse if hemoglobin drops below 7    #. Hyperglycemia/DMt2  - d/c insulin, started Metformin 500mg bid, to titrate up to 850mg bid, then 1000mg bid, over 3 weeks  - bg finger sticks q6h    #. HTN  - continue losartan 50 mg po daily  - added amlodipine 5    #. HLD  - continue atorvastatin 20 mg po qhs    #. viral cardiomyopathy  - follow up OP    Activity: AAT  diet: carb consistent  dvt ppx: heparin subq  gi ppx: not indicated  full code  dispo: from home, pending 4A pending worker's comp

## 2019-08-13 LAB
ANION GAP SERPL CALC-SCNC: 13 MMOL/L — SIGNIFICANT CHANGE UP (ref 7–14)
BUN SERPL-MCNC: 15 MG/DL — SIGNIFICANT CHANGE UP (ref 10–20)
CALCIUM SERPL-MCNC: 9.9 MG/DL — SIGNIFICANT CHANGE UP (ref 8.5–10.1)
CHLORIDE SERPL-SCNC: 104 MMOL/L — SIGNIFICANT CHANGE UP (ref 98–110)
CO2 SERPL-SCNC: 23 MMOL/L — SIGNIFICANT CHANGE UP (ref 17–32)
CREAT SERPL-MCNC: 0.6 MG/DL — LOW (ref 0.7–1.5)
GLUCOSE BLDC GLUCOMTR-MCNC: 132 MG/DL — HIGH (ref 70–99)
GLUCOSE BLDC GLUCOMTR-MCNC: 177 MG/DL — HIGH (ref 70–99)
GLUCOSE BLDC GLUCOMTR-MCNC: 182 MG/DL — HIGH (ref 70–99)
GLUCOSE BLDC GLUCOMTR-MCNC: 182 MG/DL — HIGH (ref 70–99)
GLUCOSE BLDC GLUCOMTR-MCNC: 96 MG/DL — SIGNIFICANT CHANGE UP (ref 70–99)
GLUCOSE SERPL-MCNC: 176 MG/DL — HIGH (ref 70–99)
HCT VFR BLD CALC: 26.8 % — LOW (ref 37–47)
HGB BLD-MCNC: 8.5 G/DL — LOW (ref 12–16)
MCHC RBC-ENTMCNC: 26.6 PG — LOW (ref 27–31)
MCHC RBC-ENTMCNC: 31.7 G/DL — LOW (ref 32–37)
MCV RBC AUTO: 83.8 FL — SIGNIFICANT CHANGE UP (ref 81–99)
NRBC # BLD: 0 /100 WBCS — SIGNIFICANT CHANGE UP (ref 0–0)
PLATELET # BLD AUTO: 346 K/UL — SIGNIFICANT CHANGE UP (ref 130–400)
POTASSIUM SERPL-MCNC: 4.4 MMOL/L — SIGNIFICANT CHANGE UP (ref 3.5–5)
POTASSIUM SERPL-SCNC: 4.4 MMOL/L — SIGNIFICANT CHANGE UP (ref 3.5–5)
RBC # BLD: 3.2 M/UL — LOW (ref 4.2–5.4)
RBC # FLD: 14.6 % — HIGH (ref 11.5–14.5)
SODIUM SERPL-SCNC: 140 MMOL/L — SIGNIFICANT CHANGE UP (ref 135–146)
WBC # BLD: 3.78 K/UL — LOW (ref 4.8–10.8)
WBC # FLD AUTO: 3.78 K/UL — LOW (ref 4.8–10.8)

## 2019-08-13 PROCEDURE — 99232 SBSQ HOSP IP/OBS MODERATE 35: CPT

## 2019-08-13 RX ORDER — LANOLIN ALCOHOL/MO/W.PET/CERES
3 CREAM (GRAM) TOPICAL ONCE
Refills: 0 | Status: COMPLETED | OUTPATIENT
Start: 2019-08-13 | End: 2019-08-13

## 2019-08-13 RX ORDER — INSULIN LISPRO 100/ML
3 VIAL (ML) SUBCUTANEOUS
Refills: 0 | Status: DISCONTINUED | OUTPATIENT
Start: 2019-08-13 | End: 2019-08-15

## 2019-08-13 RX ADMIN — Medication 650 MILLIGRAM(S): at 10:13

## 2019-08-13 RX ADMIN — HEPARIN SODIUM 5000 UNIT(S): 5000 INJECTION INTRAVENOUS; SUBCUTANEOUS at 05:36

## 2019-08-13 RX ADMIN — TRAMADOL HYDROCHLORIDE 50 MILLIGRAM(S): 50 TABLET ORAL at 14:14

## 2019-08-13 RX ADMIN — METFORMIN HYDROCHLORIDE 500 MILLIGRAM(S): 850 TABLET ORAL at 05:39

## 2019-08-13 RX ADMIN — METFORMIN HYDROCHLORIDE 500 MILLIGRAM(S): 850 TABLET ORAL at 17:45

## 2019-08-13 RX ADMIN — Medication 81 MILLIGRAM(S): at 14:16

## 2019-08-13 RX ADMIN — ATORVASTATIN CALCIUM 20 MILLIGRAM(S): 80 TABLET, FILM COATED ORAL at 22:23

## 2019-08-13 RX ADMIN — Medication 650 MILLIGRAM(S): at 10:12

## 2019-08-13 RX ADMIN — AMLODIPINE BESYLATE 5 MILLIGRAM(S): 2.5 TABLET ORAL at 05:35

## 2019-08-13 RX ADMIN — LOSARTAN POTASSIUM 50 MILLIGRAM(S): 100 TABLET, FILM COATED ORAL at 05:35

## 2019-08-13 RX ADMIN — Medication 3 MILLIGRAM(S): at 22:48

## 2019-08-13 RX ADMIN — TRAMADOL HYDROCHLORIDE 50 MILLIGRAM(S): 50 TABLET ORAL at 22:28

## 2019-08-13 RX ADMIN — Medication 3 UNIT(S): at 17:46

## 2019-08-13 RX ADMIN — HEPARIN SODIUM 5000 UNIT(S): 5000 INJECTION INTRAVENOUS; SUBCUTANEOUS at 17:45

## 2019-08-13 RX ADMIN — Medication 100 MILLIGRAM(S): at 14:15

## 2019-08-13 RX ADMIN — Medication 3 UNIT(S): at 12:02

## 2019-08-13 NOTE — PROGRESS NOTE ADULT - ASSESSMENT
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  The patient was also evaluated and treated for a right labial abscess likely related to uncontrolled diabetes.    Fall complicated by left hip fracture: status post ORIF. Continue Tramadol PRN for pain.  Post-operative acute blood loss anemia has stabilized, increasing to 8.5. WBAT to the LLE.  Continue bedside physical therapy  Right Labial Abscess: status post incision and drainage.  Wound cultures were positive for Candida Albicans.  Status post treatment with Augmentin and Diflucan.  Viral cardiomyopathy/moderate AS: repeat echocardiogram demonstrates normal ejection fraction with moderate AS.  Follow-up with cardiology as outpatient.  Uncontrolled DMII: FS have stabilized from admission.  Pt was started on Metformin 500mg PO q12.  Add Januvia 50mg PO q24 to home medications (non-formulary) upon discharge.  Hypertension: continue Losartan 50mg q24  GI/DVT prophylaxis    #Progress Note Handoff    Pending:    Future Disposition: Medically stable for discharge; awaiting worker's comp

## 2019-08-13 NOTE — PROGRESS NOTE ADULT - SUBJECTIVE AND OBJECTIVE BOX
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  The patient was also evaluated and treated for a right labial abscess likely related to uncontrolled diabetes.  Status post ORIF    Overnight events/Updates: Pending Worker's Compensation approval for acute inpatient rehab.     Vital Signs Last 24 Hrs  T(C): 36.3 (13 Aug 2019 07:30), Max: 36.8 (13 Aug 2019 00:50)  T(F): 97.3 (13 Aug 2019 07:30), Max: 98.3 (13 Aug 2019 00:50)  HR: 80 (13 Aug 2019 07:30) (78 - 83)  BP: 153/81 (13 Aug 2019 07:30) (123/60 - 170/75)  BP(mean): --  RR: 18 (13 Aug 2019 07:30) (18 - 18)  SpO2: 98% (13 Aug 2019 00:50) (98% - 98%)    Physical Examination:  General: AAO x 3  HEENT: PERRLA, EOMI  CV= S1 & S2 appreciated  Lungs= CTA BL  Abdominal Examination= + BS, Soft, NT/ND  Extremity Examination= No C/C/E    ROS: No chest pain, no shortness of breath.  All other systems reviewed and are within normal limits except for the complaints in the HPI.    MEDICATIONS  (STANDING):  amLODIPine   Tablet 5 milliGRAM(s) Oral daily  aspirin  chewable 81 milliGRAM(s) Oral daily  atorvastatin 20 milliGRAM(s) Oral at bedtime  chlorhexidine 4% Liquid 1 Application(s) Topical <User Schedule>  dextrose 5%. 1000 milliLiter(s) (50 mL/Hr) IV Continuous <Continuous>  dextrose 50% Injectable 25 Gram(s) IV Push once  dextrose 50% Injectable 25 Gram(s) IV Push once  docusate sodium 100 milliGRAM(s) Oral daily  heparin  Injectable 5000 Unit(s) SubCutaneous every 12 hours  insulin lispro Injectable (HumaLOG) 3 Unit(s) SubCutaneous three times a day before meals  losartan 50 milliGRAM(s) Oral daily  metFORMIN 500 milliGRAM(s) Oral every 12 hours    MEDICATIONS  (PRN):  acetaminophen   Tablet .. 650 milliGRAM(s) Oral every 6 hours PRN Mild Pain (1 - 3)  glucagon  Injectable 1 milliGRAM(s) IntraMuscular once PRN Glucose LESS THAN 70 milligrams/deciliter  traMADol 50 milliGRAM(s) Oral every 4 hours PRN Moderate Pain (4 - 6)                            8.5    3.78  )-----------( 346      ( 13 Aug 2019 06:37 )             26.8     08-13    140  |  104  |  15  ----------------------------<  176<H>  4.4   |  23  |  0.6<L>    Ca    9.9      13 Aug 2019 06:37        Case discussed with housestaff & family  MILTON Steiner [id number]

## 2019-08-13 NOTE — PROGRESS NOTE ADULT - SUBJECTIVE AND OBJECTIVE BOX
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  As per patient she was walking and tripped on her feet.  She fell and did not hit her head, did not loose consciousness and did not having any incontinence.  Her brother who is at bedside explains that for a long time he has noted that his sister, the patient has ambulatory issues. In the ED patient was complaining of Left hip pain and inability to ambulate. Also reports an abscess in her Right groin that she has been self-medicating with Amoxicillin 850mg X 8 day prior to fall.  Although the patient mentions she has no primary doctor she does explain that she has seen eugenia chapa in the past.  She does not recall any visits to the doctor however within the last 10 years.   Self medicating with Furosemide occasionally when she feels bloated. Reports occasional gait issues with bilateral leg "stiffness and imbalance  Denies any fever, chills, nausea, vomiting, headache, abd pain, chest pain, lower extremity swelling.  No recent sick contacts or decrease in po intake.      Currently admitted to medicine with the primary diagnosis of Subtrochanteric fracture of left femur     Today is hospital day 12d.     INTERVAL HPI / OVERNIGHT EVENTS:  Patient was examined and seen at bedside. This morning she is resting comfortably in bed and reports no new issues. Overnight, patient's BP kathie to 170/75 and, was given scheduled meds, amlodipine and losartan.    ROS: Otherwise unremarkable     PAST MEDICAL & SURGICAL HISTORY  No pertinent past medical history    ALLERGIES  No Known Allergies    MEDICATIONS  STANDING MEDICATIONS  amLODIPine   Tablet 5 milliGRAM(s) Oral daily  aspirin  chewable 81 milliGRAM(s) Oral daily  atorvastatin 20 milliGRAM(s) Oral at bedtime  chlorhexidine 4% Liquid 1 Application(s) Topical <User Schedule>  dextrose 5%. 1000 milliLiter(s) IV Continuous <Continuous>  dextrose 50% Injectable 25 Gram(s) IV Push once  dextrose 50% Injectable 25 Gram(s) IV Push once  docusate sodium 100 milliGRAM(s) Oral daily  heparin  Injectable 5000 Unit(s) SubCutaneous every 12 hours  insulin lispro Injectable (HumaLOG) 3 Unit(s) SubCutaneous three times a day before meals  losartan 50 milliGRAM(s) Oral daily  metFORMIN 500 milliGRAM(s) Oral every 12 hours    PRN MEDICATIONS  acetaminophen   Tablet .. 650 milliGRAM(s) Oral every 6 hours PRN  glucagon  Injectable 1 milliGRAM(s) IntraMuscular once PRN  traMADol 50 milliGRAM(s) Oral every 4 hours PRN    VITALS:  T(F): 97.3  HR: 80  BP: 153/81  RR: 18  SpO2: 98%    PHYSICAL EXAM  GEN: NAD, Resting comfortably in bed  PULM: Clear to auscultation bilaterally, No wheezes  CVS: Regular rate and rhythm, S1-S2, no murmurs  ABD: Soft, non-tender, non-distended, no guarding  EXT: No edema  NEURO: AAOx3, no focal deficits    LABS                        8.5    3.78  )-----------( 346      ( 13 Aug 2019 06:37 )             26.8     08-13    140  |  104  |  15  ----------------------------<  176<H>  4.4   |  23  |  0.6<L>    Ca    9.9      13 Aug 2019 06:37

## 2019-08-13 NOTE — PROGRESS NOTE ADULT - ASSESSMENT
#. Intertrochantric fracture of the femur s/p IMN of the femur   - mechanical fall  - CBC stable, f/u cbc  - daily dressing changes  - rehab with OT 2-3 times/week  - for moderate pain: control with tramadol 50 mg po q4h prn and for severe pain: control with morphine 2mg IV q6h prn   - full weight bearing, AAT  - pending workers comp for 4A placement    #. R inguinal abscess   - s/p IV fluconazole  - s/p IV unsyn 3 g q6h  - s/p i&d  - culture grew candida  - ID following  - completed course of augmentin 875 BID and fluconazole 400 PO on 8/10     #. Anemia  - hemoglobin 8.5 today, up from 8.3 yesterday  - maintain active type and screen  - transfuse if hemoglobin drops below 7    #. Hyperglycemia/DMt2  - d/c insulin, started Metformin 500mg bid, to titrate up to 850mg bid, then 1000mg bid, over 3 weeks  - added 3 units of insulin lispro with meals  - bg finger sticks before meals and at bedtime    #. HTN  - continue losartan 50 mg po daily  - added amlodipine 5    #. HLD  - continue atorvastatin 20 mg po qhs    #. viral cardiomyopathy  - follow up OP    Activity: AAT  diet: carb consistent  dvt ppx: heparin subq  gi ppx: not indicated  full code  dispo: from home, pending 4A pending worker's comp #. Intertrochantric fracture of the femur s/p IMN of the femur   - mechanical fall  - CBC stable, f/u cbc  - daily dressing changes  - rehab with OT 2-3 times/week  - for moderate pain: control with tramadol 50 mg po q4h prn and for severe pain: control with morphine 2mg IV q6h prn   - full weight bearing, AAT  - pending workers comp for 4A placement    #. R inguinal abscess   - s/p IV fluconazole  - s/p IV unsyn 3 g q6h  - s/p i&d  - culture grew candida  - ID following  - completed course of augmentin 875 BID and fluconazole 400 PO on 8/10     #. Anemia  - hemoglobin 8.5 today, up from 8.3 yesterday  - maintain active type and screen  - transfuse if hemoglobin drops below 7    #. Hyperglycemia/DMt2  - d/c insulin, started Metformin 500mg bid, to titrate up to 850mg bid, then 1000mg bid, over 3 weeks  - added 3 units of insulin lispro with meals  - to d/c on metformin and januvia  - bg finger sticks before meals and at bedtime    #. HTN  - continue losartan 50 mg po daily  - added amlodipine 5    #. HLD  - continue atorvastatin 20 mg po qhs    #. viral cardiomyopathy  - follow up OP    Activity: AAT  diet: carb consistent  dvt ppx: heparin subq  gi ppx: not indicated  full code  dispo: from home, pending 4A pending worker's comp

## 2019-08-13 NOTE — PROVIDER CONTACT NOTE (OTHER) - SITUATION
Pt with elevated /75 P: 81. 6 AM scheduled meds, Amlodipine 5 mg and Losartan 50 mg give. BP reassessed 1 hour later  BP: 165/74 P: 82.

## 2019-08-13 NOTE — PROGRESS NOTE ADULT - SUBJECTIVE AND OBJECTIVE BOX
DIAGNOSIS:   HOSPITAL DAY #:    STATUS POST:    POST OPERATIVE DAY #:     Vital Signs Last 24 Hrs  T(C): 36.3 (13 Aug 2019 07:30), Max: 36.8 (13 Aug 2019 00:50)  T(F): 97.3 (13 Aug 2019 07:30), Max: 98.3 (13 Aug 2019 00:50)  HR: 80 (13 Aug 2019 07:30) (80 - 83)  BP: 153/81 (13 Aug 2019 07:30) (153/81 - 170/75)  BP(mean): --  RR: 18 (13 Aug 2019 07:30) (18 - 18)  SpO2: 98% (13 Aug 2019 00:50) (98% - 98%)    SUBJECTIVE: Pt seen    Pain: YES  [ ]   NO [ ]   Nausea: [ ] YES [ ] NO           Vomiting: [ ] YES [ ] NO  Flatus: [ ] YES [ ] NO             Bowel Movement: [ ] YES [ ] NO     Void: [ ]YES [ ]No      ALEE DRAINAGE: SIGNIFICANT [ ]   NOT SIGNIFICANT [ ]   NOT APPLICABLE [ ]  YES [ ] NO    General Appearance: Appears well, NAD  Neck: Supple  Chest: Equal expansion bilaterally, equal breath sounds  CV: Pulse regular presently  Abdomen: Soft [x ] YES [ ]NO  DISTENDED [ ] YES [x ] NO TENDERNESS [ ]YES [x ]NO  INCISIONS: HEALING WELL [ ] YES  [ ] NO ERYTHEMA [ ] YES [ ] NO DRAINAGE [ ] YES  [ ] NO  Extremities: Grossly symmetric, CALF TENDERNESS [ ] YES  [ ] NO  groin incision healing well    LABS:                        8.5    3.78  )-----------( 346      ( 13 Aug 2019 06:37 )             26.8     08-13    140  |  104  |  15  ----------------------------<  176<H>  4.4   |  23  |  0.6<L>    Ca    9.9      13 Aug 2019 06:37              ASSESSMENT:     GOOD POST OP COURSE [ ]  YES  [ ] NO  CONDITION IMPROVING  []  YES  [ ]  NO          PLAN:    CONTINUE PRESENT MANAGEMENT  [ ] YES  [ ] NO

## 2019-08-14 ENCOUNTER — TRANSCRIPTION ENCOUNTER (OUTPATIENT)
Age: 69
End: 2019-08-14

## 2019-08-14 LAB
ALBUMIN SERPL ELPH-MCNC: 4 G/DL — SIGNIFICANT CHANGE UP (ref 3.5–5.2)
ALP SERPL-CCNC: 116 U/L — HIGH (ref 30–115)
ALT FLD-CCNC: 10 U/L — SIGNIFICANT CHANGE UP (ref 0–41)
ANION GAP SERPL CALC-SCNC: 16 MMOL/L — HIGH (ref 7–14)
AST SERPL-CCNC: 15 U/L — SIGNIFICANT CHANGE UP (ref 0–41)
BILIRUB SERPL-MCNC: 0.3 MG/DL — SIGNIFICANT CHANGE UP (ref 0.2–1.2)
BUN SERPL-MCNC: 17 MG/DL — SIGNIFICANT CHANGE UP (ref 10–20)
CALCIUM SERPL-MCNC: 10.4 MG/DL — HIGH (ref 8.5–10.1)
CHLORIDE SERPL-SCNC: 102 MMOL/L — SIGNIFICANT CHANGE UP (ref 98–110)
CO2 SERPL-SCNC: 22 MMOL/L — SIGNIFICANT CHANGE UP (ref 17–32)
CREAT SERPL-MCNC: 0.7 MG/DL — SIGNIFICANT CHANGE UP (ref 0.7–1.5)
GLUCOSE BLDC GLUCOMTR-MCNC: 117 MG/DL — HIGH (ref 70–99)
GLUCOSE BLDC GLUCOMTR-MCNC: 142 MG/DL — HIGH (ref 70–99)
GLUCOSE BLDC GLUCOMTR-MCNC: 143 MG/DL — HIGH (ref 70–99)
GLUCOSE BLDC GLUCOMTR-MCNC: 149 MG/DL — HIGH (ref 70–99)
GLUCOSE SERPL-MCNC: 145 MG/DL — HIGH (ref 70–99)
HCT VFR BLD CALC: 28.8 % — LOW (ref 37–47)
HGB BLD-MCNC: 9 G/DL — LOW (ref 12–16)
MCHC RBC-ENTMCNC: 26.8 PG — LOW (ref 27–31)
MCHC RBC-ENTMCNC: 31.3 G/DL — LOW (ref 32–37)
MCV RBC AUTO: 85.7 FL — SIGNIFICANT CHANGE UP (ref 81–99)
NRBC # BLD: 0 /100 WBCS — SIGNIFICANT CHANGE UP (ref 0–0)
PLATELET # BLD AUTO: 417 K/UL — HIGH (ref 130–400)
POTASSIUM SERPL-MCNC: 4.7 MMOL/L — SIGNIFICANT CHANGE UP (ref 3.5–5)
POTASSIUM SERPL-SCNC: 4.7 MMOL/L — SIGNIFICANT CHANGE UP (ref 3.5–5)
PROT SERPL-MCNC: 7.1 G/DL — SIGNIFICANT CHANGE UP (ref 6–8)
RBC # BLD: 3.36 M/UL — LOW (ref 4.2–5.4)
RBC # FLD: 14.7 % — HIGH (ref 11.5–14.5)
SODIUM SERPL-SCNC: 140 MMOL/L — SIGNIFICANT CHANGE UP (ref 135–146)
WBC # BLD: 6.51 K/UL — SIGNIFICANT CHANGE UP (ref 4.8–10.8)
WBC # FLD AUTO: 6.51 K/UL — SIGNIFICANT CHANGE UP (ref 4.8–10.8)

## 2019-08-14 PROCEDURE — 99232 SBSQ HOSP IP/OBS MODERATE 35: CPT

## 2019-08-14 RX ORDER — LOPERAMIDE HCL 2 MG
2 TABLET ORAL ONCE
Refills: 0 | Status: COMPLETED | OUTPATIENT
Start: 2019-08-14 | End: 2019-08-14

## 2019-08-14 RX ORDER — LANOLIN ALCOHOL/MO/W.PET/CERES
3 CREAM (GRAM) TOPICAL AT BEDTIME
Refills: 0 | Status: DISCONTINUED | OUTPATIENT
Start: 2019-08-14 | End: 2019-08-15

## 2019-08-14 RX ADMIN — Medication 2 MILLIGRAM(S): at 16:01

## 2019-08-14 RX ADMIN — Medication 3 UNIT(S): at 13:16

## 2019-08-14 RX ADMIN — METFORMIN HYDROCHLORIDE 500 MILLIGRAM(S): 850 TABLET ORAL at 06:27

## 2019-08-14 RX ADMIN — TRAMADOL HYDROCHLORIDE 50 MILLIGRAM(S): 50 TABLET ORAL at 17:08

## 2019-08-14 RX ADMIN — Medication 81 MILLIGRAM(S): at 13:16

## 2019-08-14 RX ADMIN — HEPARIN SODIUM 5000 UNIT(S): 5000 INJECTION INTRAVENOUS; SUBCUTANEOUS at 17:11

## 2019-08-14 RX ADMIN — Medication 3 UNIT(S): at 08:25

## 2019-08-14 RX ADMIN — Medication 650 MILLIGRAM(S): at 09:24

## 2019-08-14 RX ADMIN — METFORMIN HYDROCHLORIDE 500 MILLIGRAM(S): 850 TABLET ORAL at 17:11

## 2019-08-14 RX ADMIN — CHLORHEXIDINE GLUCONATE 1 APPLICATION(S): 213 SOLUTION TOPICAL at 06:27

## 2019-08-14 RX ADMIN — HEPARIN SODIUM 5000 UNIT(S): 5000 INJECTION INTRAVENOUS; SUBCUTANEOUS at 06:27

## 2019-08-14 RX ADMIN — ATORVASTATIN CALCIUM 20 MILLIGRAM(S): 80 TABLET, FILM COATED ORAL at 21:34

## 2019-08-14 RX ADMIN — TRAMADOL HYDROCHLORIDE 50 MILLIGRAM(S): 50 TABLET ORAL at 17:55

## 2019-08-14 RX ADMIN — AMLODIPINE BESYLATE 5 MILLIGRAM(S): 2.5 TABLET ORAL at 06:27

## 2019-08-14 RX ADMIN — LOSARTAN POTASSIUM 50 MILLIGRAM(S): 100 TABLET, FILM COATED ORAL at 06:27

## 2019-08-14 RX ADMIN — Medication 3 MILLIGRAM(S): at 22:12

## 2019-08-14 RX ADMIN — Medication 650 MILLIGRAM(S): at 10:17

## 2019-08-14 NOTE — PROVIDER CONTACT NOTE (OTHER) - ACTION/TREATMENT ORDERED:
MD aware, will place order
MD Esqueda notified and advised writer to continue to monitor BP.
Will put in pain medication

## 2019-08-14 NOTE — PROGRESS NOTE ADULT - ASSESSMENT
#. Intertrochantric fracture of the femur s/p IMN of the femur   - mechanical fall  - CBC stable, f/u cbc  - daily dressing changes  - rehab with OT 2-3 times/week  - for moderate pain: control with tramadol 50 mg po q4h prn and for severe pain: control with morphine 2mg IV q6h prn   - full weight bearing, AAT  - pending workers comp for 4A placement    #. R inguinal abscess   - s/p IV fluconazole  - s/p IV unsyn 3 g q6h  - s/p i&d  - culture grew candida  - ID following  - completed course of augmentin 875 BID and fluconazole 400 PO on 8/10     #. Anemia  - hemoglobin 8.5 today, up from 8.3 yesterday  - maintain active type and screen  - transfuse if hemoglobin drops below 7    #. Hyperglycemia/DMt2  - on Metformin 500mg bid, to titrate up to 850mg bid, then 1000mg bid, over 3 weeks  - continue 3 units of insulin lispro with meals  - to d/c on metformin and januvia  - bg finger sticks before meals and at bedtime    #. HTN  - continue losartan 50 mg po daily  - added amlodipine 5    #. HLD  - continue atorvastatin 20 mg po qhs    #. viral cardiomyopathy  - follow up OP    Activity: AAT  diet: carb consistent  dvt ppx: heparin subq  gi ppx: not indicated  full code  dispo: from home, pending 4A pending worker's comp

## 2019-08-14 NOTE — PROGRESS NOTE ADULT - SUBJECTIVE AND OBJECTIVE BOX
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  As per patient she was walking and tripped on her feet.  She fell and did not hit her head, did not loose consciousness and did not having any incontinence.  Her brother who is at bedside explains that for a long time he has noted that his sister, the patient has ambulatory issues. In the ED patient was complaining of Left hip pain and inability to ambulate. Also reports an abscess in her Right groin that she has been self-medicating with Amoxicillin 850mg X 8 day prior to fall.  Although the patient mentions she has no primary doctor she does explain that she has seen eugenia chapa in the past.  She does not recall any visits to the doctor however within the last 10 years.   Self medicating with Furosemide occasionally when she feels bloated. Reports occasional gait issues with bilateral leg "stiffness and imbalance  Denies any fever, chills, nausea, vomiting, headache, abd pain, chest pain, lower extremity swelling.  No recent sick contacts or decrease in po intake.      Currently admitted to medicine with the primary diagnosis of Subtrochanteric fracture of left femur     Today is hospital day 13d.     INTERVAL HPI / OVERNIGHT EVENTS:  Patient was examined and seen at bedside. This morning she is resting comfortably in bed and reports no new issues or overnight events.     ROS: Otherwise unremarkable     PAST MEDICAL & SURGICAL HISTORY  No pertinent past medical history    ALLERGIES  No Known Allergies    MEDICATIONS  STANDING MEDICATIONS  amLODIPine   Tablet 5 milliGRAM(s) Oral daily  aspirin  chewable 81 milliGRAM(s) Oral daily  atorvastatin 20 milliGRAM(s) Oral at bedtime  chlorhexidine 4% Liquid 1 Application(s) Topical <User Schedule>  dextrose 5%. 1000 milliLiter(s) IV Continuous <Continuous>  dextrose 50% Injectable 25 Gram(s) IV Push once  dextrose 50% Injectable 25 Gram(s) IV Push once  docusate sodium 100 milliGRAM(s) Oral daily  heparin  Injectable 5000 Unit(s) SubCutaneous every 12 hours  insulin lispro Injectable (HumaLOG) 3 Unit(s) SubCutaneous three times a day before meals  losartan 50 milliGRAM(s) Oral daily  metFORMIN 500 milliGRAM(s) Oral every 12 hours    PRN MEDICATIONS  acetaminophen   Tablet .. 650 milliGRAM(s) Oral every 6 hours PRN  glucagon  Injectable 1 milliGRAM(s) IntraMuscular once PRN  traMADol 50 milliGRAM(s) Oral every 4 hours PRN    VITALS:  T(F): 97.3  HR: 63  BP: 145/69  RR: 18  SpO2: --    PHYSICAL EXAM  GEN: NAD, Resting comfortably in bed  PULM: Clear to auscultation bilaterally, No wheezes  CVS: Regular rate and rhythm, S1-S2, no murmurs  ABD: Soft, non-tender, non-distended, no guarding  EXT: No edema  NEURO: AAOx3, no focal deficits    LABS                        9.0    6.51  )-----------( 417      ( 14 Aug 2019 06:06 )             28.8     08-14    140  |  102  |  17  ----------------------------<  145<H>  4.7   |  22  |  0.7    Ca    10.4<H>      14 Aug 2019 06:06    TPro  7.1  /  Alb  4.0  /  TBili  0.3  /  DBili  x   /  AST  15  /  ALT  10  /  AlkPhos  116<H>  08-14

## 2019-08-14 NOTE — PROGRESS NOTE ADULT - SUBJECTIVE AND OBJECTIVE BOX
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  The patient was also evaluated and treated for a right labial abscess likely related to uncontrolled diabetes.  Status post ORIF    Overnight events/Updates: The patient is awaiting Worker's Compensation approval for PT @ home vs. acute inpatient rehab.  She complained of semi formed bowel movements after starting metformin.    Vital Signs Last 24 Hrs  T(C): 36.3 (14 Aug 2019 07:30), Max: 36.3 (14 Aug 2019 07:30)  T(F): 97.3 (14 Aug 2019 07:30), Max: 97.3 (14 Aug 2019 07:30)  HR: 63 (14 Aug 2019 07:30) (63 - 78)  BP: 145/69 (14 Aug 2019 07:30) (145/69 - 150/69)  BP(mean): --  RR: 18 (14 Aug 2019 07:30) (18 - 19)  SpO2: --    Physical Examination:  General: AAO x 3  HEENT: PERRLA, EOMI  CV= S1 & S2 appreciated  Lungs= CTA BL  Abdominal Examination= + BS, Soft, NT/ND  Extremity Examination= No C/C/E    ROS: No chest pain, no shortness of breath.  All other systems reviewed and are within normal limits except for the complaints in the HPI.    MEDICATIONS  (STANDING):  amLODIPine   Tablet 5 milliGRAM(s) Oral daily  aspirin  chewable 81 milliGRAM(s) Oral daily  atorvastatin 20 milliGRAM(s) Oral at bedtime  chlorhexidine 4% Liquid 1 Application(s) Topical <User Schedule>  dextrose 5%. 1000 milliLiter(s) (50 mL/Hr) IV Continuous <Continuous>  dextrose 50% Injectable 25 Gram(s) IV Push once  dextrose 50% Injectable 25 Gram(s) IV Push once  heparin  Injectable 5000 Unit(s) SubCutaneous every 12 hours  insulin lispro Injectable (HumaLOG) 3 Unit(s) SubCutaneous three times a day before meals  loperamide 2 milliGRAM(s) Oral once  losartan 50 milliGRAM(s) Oral daily  metFORMIN 500 milliGRAM(s) Oral every 12 hours    MEDICATIONS  (PRN):  acetaminophen   Tablet .. 650 milliGRAM(s) Oral every 6 hours PRN Mild Pain (1 - 3)  glucagon  Injectable 1 milliGRAM(s) IntraMuscular once PRN Glucose LESS THAN 70 milligrams/deciliter  traMADol 50 milliGRAM(s) Oral every 4 hours PRN Moderate Pain (4 - 6)                            9.0    6.51  )-----------( 417      ( 14 Aug 2019 06:06 )             28.8     08-14    140  |  102  |  17  ----------------------------<  145<H>  4.7   |  22  |  0.7    Ca    10.4<H>      14 Aug 2019 06:06    TPro  7.1  /  Alb  4.0  /  TBili  0.3  /  DBili  x   /  AST  15  /  ALT  10  /  AlkPhos  116<H>  08-14      Case discussed with housestaff & family  MILTON Steiner [id number]

## 2019-08-14 NOTE — PROGRESS NOTE ADULT - ASSESSMENT
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  The patient was also evaluated and treated for a right labial abscess likely related to uncontrolled diabetes.    Fall complicated by left hip fracture: status post ORIF. Continue Tramadol PRN for pain.  Hemoglobin improved to 9.   WBAT to the LLE.  Continue bedside physical therapy  Soft stools: likely due to Metformin.  Discontinue colace.  Right Labial Abscess: status post incision and drainage.  Wound cultures were positive for Candida Albicans.  Status post treatment with Augmentin and Diflucan.  Viral cardiomyopathy/moderate AS: repeat echocardiogram demonstrates normal ejection fraction with moderate AS.  Follow-up with cardiology as outpatient.  Uncontrolled DMII: FS have stabilized from admission.  Continue Metformin 500mg PO q12.  Add Januvia 50mg PO q24 to home medications (non-formulary) upon discharge.  Hypertension: continue Losartan 50mg q24  GI/DVT prophylaxis    #Progress Note Handoff    Pending:    Future Disposition: Medically stable for discharge; awaiting worker's comp

## 2019-08-14 NOTE — DISCHARGE NOTE NURSING/CASE MANAGEMENT/SOCIAL WORK - NSDCDPATPORTLINK_GEN_ALL_CORE
You can access the [facility] Patient Portal, offered by [facility], by registering with the following website: [email or web address]

## 2019-08-14 NOTE — PROGRESS NOTE ADULT - SUBJECTIVE AND OBJECTIVE BOX
DIAGNOSIS:   HOSPITAL DAY #:    STATUS POST:    POST OPERATIVE DAY #:     Vital Signs Last 24 Hrs  T(C): 36.7 (14 Aug 2019 15:00), Max: 36.7 (14 Aug 2019 15:00)  T(F): 98 (14 Aug 2019 15:00), Max: 98 (14 Aug 2019 15:00)  HR: 89 (14 Aug 2019 15:00) (63 - 89)  BP: 151/69 (14 Aug 2019 15:00) (145/69 - 151/69)  BP(mean): --  RR: 18 (14 Aug 2019 15:00) (18 - 19)  SpO2: --    SUBJECTIVE: Pt seen    Pain: YES  [ ]   NO [ ]   Nausea: [ ] YES [ ] NO           Vomiting: [ ] YES [ ] NO  Flatus: [ ] YES [ ] NO             Bowel Movement: [ ] YES [ ] NO     Void: [ ]YES [ ]No      ALEE DRAINAGE: SIGNIFICANT [ ]   NOT SIGNIFICANT [ ]   NOT APPLICABLE [ ]  YES [ ] NO    General Appearance: Appears well, NAD  Neck: Supple  Chest: Equal expansion bilaterally, equal breath sounds  CV: Pulse regular presently  Abdomen: Soft [x ] YES [ ]NO  DISTENDED [ ] YES [x ] NO TENDERNESS [ ]YES [x ]NO  INCISIONS: HEALING WELL [ ] YES  [ ] NO ERYTHEMA [ ] YES [ ] NO DRAINAGE [ ] YES  [ ] NO  Extremities: Grossly symmetric, CALF TENDERNESS [ ] YES  [ ] NO  ambulating better  RT groin incision healed  LABS:                        9.0    6.51  )-----------( 417      ( 14 Aug 2019 06:06 )             28.8     08-14    140  |  102  |  17  ----------------------------<  145<H>  4.7   |  22  |  0.7    Ca    10.4<H>      14 Aug 2019 06:06    TPro  7.1  /  Alb  4.0  /  TBili  0.3  /  DBili  x   /  AST  15  /  ALT  10  /  AlkPhos  116<H>  08-14            ASSESSMENT:     GOOD POST OP COURSE [ ]  YES  [ ] NO  CONDITION IMPROVING  []  YES  [ ]  NO          PLAN:    CONTINUE PRESENT MANAGEMENT  [ ] YES  [ ] NO

## 2019-08-14 NOTE — PROVIDER CONTACT NOTE (MEDICATION) - SITUATION
Pt requesting order for tramadol.
Pt requesting sleep aid.
pt's RALS BG is 79 this morning. pt is due for 7 units nutritional humalog insulin.
spoke with MD to inform that BG is 142 but pt is due for 3 units lispro

## 2019-08-15 VITALS
HEART RATE: 95 BPM | SYSTOLIC BLOOD PRESSURE: 110 MMHG | RESPIRATION RATE: 18 BRPM | TEMPERATURE: 98 F | DIASTOLIC BLOOD PRESSURE: 59 MMHG

## 2019-08-15 LAB
ALBUMIN SERPL ELPH-MCNC: 3.8 G/DL — SIGNIFICANT CHANGE UP (ref 3.5–5.2)
ALP SERPL-CCNC: 104 U/L — SIGNIFICANT CHANGE UP (ref 30–115)
ALT FLD-CCNC: 7 U/L — SIGNIFICANT CHANGE UP (ref 0–41)
ANION GAP SERPL CALC-SCNC: 17 MMOL/L — HIGH (ref 7–14)
AST SERPL-CCNC: 12 U/L — SIGNIFICANT CHANGE UP (ref 0–41)
BILIRUB SERPL-MCNC: 0.4 MG/DL — SIGNIFICANT CHANGE UP (ref 0.2–1.2)
BUN SERPL-MCNC: 15 MG/DL — SIGNIFICANT CHANGE UP (ref 10–20)
CALCIUM SERPL-MCNC: 10.2 MG/DL — HIGH (ref 8.5–10.1)
CHLORIDE SERPL-SCNC: 103 MMOL/L — SIGNIFICANT CHANGE UP (ref 98–110)
CO2 SERPL-SCNC: 19 MMOL/L — SIGNIFICANT CHANGE UP (ref 17–32)
CREAT SERPL-MCNC: 0.7 MG/DL — SIGNIFICANT CHANGE UP (ref 0.7–1.5)
GLUCOSE BLDC GLUCOMTR-MCNC: 175 MG/DL — HIGH (ref 70–99)
GLUCOSE BLDC GLUCOMTR-MCNC: 182 MG/DL — HIGH (ref 70–99)
GLUCOSE SERPL-MCNC: 160 MG/DL — HIGH (ref 70–99)
HCT VFR BLD CALC: 29.2 % — LOW (ref 37–47)
HGB BLD-MCNC: 9.2 G/DL — LOW (ref 12–16)
MCHC RBC-ENTMCNC: 26.7 PG — LOW (ref 27–31)
MCHC RBC-ENTMCNC: 31.5 G/DL — LOW (ref 32–37)
MCV RBC AUTO: 84.6 FL — SIGNIFICANT CHANGE UP (ref 81–99)
NRBC # BLD: 0 /100 WBCS — SIGNIFICANT CHANGE UP (ref 0–0)
PLATELET # BLD AUTO: 352 K/UL — SIGNIFICANT CHANGE UP (ref 130–400)
POTASSIUM SERPL-MCNC: 4.3 MMOL/L — SIGNIFICANT CHANGE UP (ref 3.5–5)
POTASSIUM SERPL-SCNC: 4.3 MMOL/L — SIGNIFICANT CHANGE UP (ref 3.5–5)
PROT SERPL-MCNC: 6.8 G/DL — SIGNIFICANT CHANGE UP (ref 6–8)
RBC # BLD: 3.45 M/UL — LOW (ref 4.2–5.4)
RBC # FLD: 14.9 % — HIGH (ref 11.5–14.5)
SODIUM SERPL-SCNC: 139 MMOL/L — SIGNIFICANT CHANGE UP (ref 135–146)
WBC # BLD: 6.77 K/UL — SIGNIFICANT CHANGE UP (ref 4.8–10.8)
WBC # FLD AUTO: 6.77 K/UL — SIGNIFICANT CHANGE UP (ref 4.8–10.8)

## 2019-08-15 PROCEDURE — 99232 SBSQ HOSP IP/OBS MODERATE 35: CPT

## 2019-08-15 RX ORDER — METFORMIN HYDROCHLORIDE 850 MG/1
1 TABLET ORAL
Qty: 0 | Refills: 0 | DISCHARGE
Start: 2019-08-15

## 2019-08-15 RX ORDER — TRAMADOL HYDROCHLORIDE 50 MG/1
1 TABLET ORAL
Qty: 30 | Refills: 0
Start: 2019-08-15 | End: 2019-08-21

## 2019-08-15 RX ORDER — LOSARTAN POTASSIUM 100 MG/1
1 TABLET, FILM COATED ORAL
Qty: 30 | Refills: 0
Start: 2019-08-15 | End: 2019-09-13

## 2019-08-15 RX ORDER — SITAGLIPTIN 50 MG/1
1 TABLET, FILM COATED ORAL
Qty: 30 | Refills: 0
Start: 2019-08-15 | End: 2019-09-13

## 2019-08-15 RX ORDER — AMLODIPINE BESYLATE 2.5 MG/1
1 TABLET ORAL
Qty: 0 | Refills: 0 | DISCHARGE
Start: 2019-08-15

## 2019-08-15 RX ORDER — AMLODIPINE BESYLATE 2.5 MG/1
1 TABLET ORAL
Qty: 30 | Refills: 0
Start: 2019-08-15 | End: 2019-09-13

## 2019-08-15 RX ORDER — ATORVASTATIN CALCIUM 80 MG/1
1 TABLET, FILM COATED ORAL
Qty: 30 | Refills: 0
Start: 2019-08-15 | End: 2019-09-13

## 2019-08-15 RX ORDER — METFORMIN HYDROCHLORIDE 850 MG/1
1 TABLET ORAL
Qty: 60 | Refills: 0
Start: 2019-08-15 | End: 2019-09-13

## 2019-08-15 RX ORDER — LOPERAMIDE HCL 2 MG
2 TABLET ORAL ONCE
Refills: 0 | Status: DISCONTINUED | OUTPATIENT
Start: 2019-08-15 | End: 2019-08-15

## 2019-08-15 RX ORDER — LOSARTAN POTASSIUM 100 MG/1
1 TABLET, FILM COATED ORAL
Qty: 0 | Refills: 0 | DISCHARGE
Start: 2019-08-15

## 2019-08-15 RX ORDER — ATORVASTATIN CALCIUM 80 MG/1
1 TABLET, FILM COATED ORAL
Qty: 0 | Refills: 0 | DISCHARGE
Start: 2019-08-15

## 2019-08-15 RX ADMIN — Medication 650 MILLIGRAM(S): at 14:34

## 2019-08-15 RX ADMIN — TRAMADOL HYDROCHLORIDE 50 MILLIGRAM(S): 50 TABLET ORAL at 11:58

## 2019-08-15 RX ADMIN — Medication 3 UNIT(S): at 08:15

## 2019-08-15 RX ADMIN — Medication 3 UNIT(S): at 12:19

## 2019-08-15 RX ADMIN — AMLODIPINE BESYLATE 5 MILLIGRAM(S): 2.5 TABLET ORAL at 05:21

## 2019-08-15 RX ADMIN — LOSARTAN POTASSIUM 50 MILLIGRAM(S): 100 TABLET, FILM COATED ORAL at 05:21

## 2019-08-15 RX ADMIN — METFORMIN HYDROCHLORIDE 500 MILLIGRAM(S): 850 TABLET ORAL at 05:21

## 2019-08-15 RX ADMIN — Medication 81 MILLIGRAM(S): at 12:20

## 2019-08-15 RX ADMIN — TRAMADOL HYDROCHLORIDE 50 MILLIGRAM(S): 50 TABLET ORAL at 13:00

## 2019-08-15 RX ADMIN — HEPARIN SODIUM 5000 UNIT(S): 5000 INJECTION INTRAVENOUS; SUBCUTANEOUS at 05:21

## 2019-08-15 RX ADMIN — Medication 650 MILLIGRAM(S): at 13:56

## 2019-08-15 RX ADMIN — Medication 30 MILLILITER(S): at 10:35

## 2019-08-15 NOTE — PROGRESS NOTE ADULT - ASSESSMENT
#. Intertrochantric fracture of the femur s/p IMN of the femur   - mechanical fall  - CBC stable, f/u cbc  - daily dressing changes  - rehab with OT 2-3 times/week  - for moderate pain: control with tramadol 50 mg po q4h prn and for severe pain: control with morphine 2mg IV q6h prn   - full weight bearing, AAT  - pending workers comp for 4A placement    #. R inguinal abscess   - s/p IV fluconazole  - s/p IV unsyn 3 g q6h  - s/p i&d  - culture grew candida  - ID following  - completed course of augmentin 875 BID and fluconazole 400 PO on 8/10     #. Anemia  - stable, hemoglobin 9.2 today, up from 9.0 yesterday  - maintain active type and screen if hgb drops again  - transfuse if hemoglobin drops below 7    #. Hyperglycemia/DMt2  - on Metformin 500mg bid, to titrate up to 850mg bid, then 1000mg bid, over 3 weeks  - loperamide/bismuth salicylate for GI upset from metformin  - continue 3 units of insulin lispro with meals  - to d/c on metformin and januvia  - bg finger sticks before meals and at bedtime    #. HTN  - continue losartan 50 mg po daily  - added amlodipine 5    #. HLD  - continue atorvastatin 20 mg po qhs    #. viral cardiomyopathy  - follow up OP    Activity: AAT  diet: carb consistent  dvt ppx: heparin subq  gi ppx: not indicated  full code  dispo: from home, pending 4A pending worker's comp

## 2019-08-15 NOTE — PROVIDER CONTACT NOTE (MEDICATION) - ACTION/TREATMENT ORDERED:
MD Kilpatrick put order for melatonin.
MD stated to hold
MD to put in another dose of immodium
MD will evaluate pt chart and order tramadol.
MD Akers notified and made aware. as per MD, hold dose of insulin.

## 2019-08-15 NOTE — PROGRESS NOTE ADULT - SUBJECTIVE AND OBJECTIVE BOX
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  The patient was also evaluated and treated for a right labial abscess likely related to uncontrolled diabetes.  Status post ORIF    Overnight events/Updates: the patient has opted to return home with homecare.  The patient reports improvement in the consistency of her stools.    Vital Signs Last 24 Hrs  T(C): 36.4 (15 Aug 2019 07:30), Max: 36.4 (15 Aug 2019 07:30)  T(F): 97.6 (15 Aug 2019 07:30), Max: 97.6 (15 Aug 2019 07:30)  HR: 95 (15 Aug 2019 07:30) (95 - 101)  BP: 110/59 (15 Aug 2019 07:30) (110/59 - 129/58)  BP(mean): --  RR: 18 (15 Aug 2019 07:30) (18 - 18)  SpO2: --    Physical Examination:  General: AAO x 3  HEENT: PERRLA, EOMI  CV= S1 & S2 appreciated  Lungs=CTA BL  Abdominal Examination= + BS, Soft, NT/ND  Extremity Examination= No C/C/E    ROS: No chest pain, no shortness of breath.  All other systems reviewed and are within normal limits except for the complaints in the HPI.    MEDICATIONS  (STANDING):  amLODIPine   Tablet 5 milliGRAM(s) Oral daily  aspirin  chewable 81 milliGRAM(s) Oral daily  atorvastatin 20 milliGRAM(s) Oral at bedtime  chlorhexidine 4% Liquid 1 Application(s) Topical <User Schedule>  dextrose 5%. 1000 milliLiter(s) (50 mL/Hr) IV Continuous <Continuous>  dextrose 50% Injectable 25 Gram(s) IV Push once  dextrose 50% Injectable 25 Gram(s) IV Push once  heparin  Injectable 5000 Unit(s) SubCutaneous every 12 hours  insulin lispro Injectable (HumaLOG) 3 Unit(s) SubCutaneous three times a day before meals  losartan 50 milliGRAM(s) Oral daily  metFORMIN 500 milliGRAM(s) Oral every 12 hours    MEDICATIONS  (PRN):  acetaminophen   Tablet .. 650 milliGRAM(s) Oral every 6 hours PRN Mild Pain (1 - 3)  glucagon  Injectable 1 milliGRAM(s) IntraMuscular once PRN Glucose LESS THAN 70 milligrams/deciliter  melatonin 3 milliGRAM(s) Oral at bedtime PRN Insomnia  traMADol 50 milliGRAM(s) Oral every 4 hours PRN Moderate Pain (4 - 6)                            9.2    6.77  )-----------( 352      ( 15 Aug 2019 06:15 )             29.2     08-15    139  |  103  |  15  ----------------------------<  160<H>  4.3   |  19  |  0.7    Ca    10.2<H>      15 Aug 2019 06:15    TPro  6.8  /  Alb  3.8  /  TBili  0.4  /  DBili  x   /  AST  12  /  ALT  7   /  AlkPhos  104  08-15      Case discussed with housestaff & family  MILTON Steiner [id number]

## 2019-08-15 NOTE — PROGRESS NOTE ADULT - SUBJECTIVE AND OBJECTIVE BOX
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  As per patient she was walking and tripped on her feet.  She fell and did not hit her head, did not loose consciousness and did not having any incontinence.  Her brother who is at bedside explains that for a long time he has noted that his sister, the patient has ambulatory issues. In the ED patient was complaining of Left hip pain and inability to ambulate. Also reports an abscess in her Right groin that she has been self-medicating with Amoxicillin 850mg X 8 day prior to fall.  Although the patient mentions she has no primary doctor she does explain that she has seen eugenia chapa in the past.  She does not recall any visits to the doctor however within the last 10 years.   Self medicating with Furosemide occasionally when she feels bloated. Reports occasional gait issues with bilateral leg "stiffness and imbalance  Denies any fever, chills, nausea, vomiting, headache, abd pain, chest pain, lower extremity swelling.  No recent sick contacts or decrease in po intake.     Currently admitted to medicine with the primary diagnosis of Subtrochanteric fracture of left femur     Today is hospital day 14d.     INTERVAL HPI / OVERNIGHT EVENTS:  Patient was examined and seen at bedside. This morning she is resting comfortably in bed and reports no new issues or overnight events. She reports she is still having some GI upset from the metformin. She states she is going home today.    ROS: Otherwise unremarkable     PAST MEDICAL & SURGICAL HISTORY  No pertinent past medical history    ALLERGIES  No Known Allergies    MEDICATIONS  STANDING MEDICATIONS  amLODIPine   Tablet 5 milliGRAM(s) Oral daily  aspirin  chewable 81 milliGRAM(s) Oral daily  atorvastatin 20 milliGRAM(s) Oral at bedtime  chlorhexidine 4% Liquid 1 Application(s) Topical <User Schedule>  dextrose 5%. 1000 milliLiter(s) IV Continuous <Continuous>  dextrose 50% Injectable 25 Gram(s) IV Push once  dextrose 50% Injectable 25 Gram(s) IV Push once  heparin  Injectable 5000 Unit(s) SubCutaneous every 12 hours  insulin lispro Injectable (HumaLOG) 3 Unit(s) SubCutaneous three times a day before meals  losartan 50 milliGRAM(s) Oral daily  metFORMIN 500 milliGRAM(s) Oral every 12 hours    PRN MEDICATIONS  acetaminophen   Tablet .. 650 milliGRAM(s) Oral every 6 hours PRN  glucagon  Injectable 1 milliGRAM(s) IntraMuscular once PRN  melatonin 3 milliGRAM(s) Oral at bedtime PRN  traMADol 50 milliGRAM(s) Oral every 4 hours PRN    VITALS:  T(F): 97.6  HR: 95  BP: 110/59  RR: 18  SpO2: --    PHYSICAL EXAM  GEN: NAD, Resting comfortably in bed  PULM: Clear to auscultation bilaterally, No wheezes  CVS: Regular rate and rhythm, S1-S2, no murmurs  ABD: Soft, non-tender, non-distended, no guarding  EXT: No edema  NEURO: AAOx3, no focal deficits    LABS                        9.2    6.77  )-----------( 352      ( 15 Aug 2019 06:15 )             29.2     08-15    139  |  103  |  15  ----------------------------<  160<H>  4.3   |  19  |  0.7    Ca    10.2<H>      15 Aug 2019 06:15    TPro  6.8  /  Alb  3.8  /  TBili  0.4  /  DBili  x   /  AST  12  /  ALT  7   /  AlkPhos  104  08-15

## 2019-08-15 NOTE — CHART NOTE - NSCHARTNOTEFT_GEN_A_CORE
Registered Dietitian Limited Follow Up    Pending 4A, pending worker's comp. Meds and labs reviewed, LBM 8/15. No edema noted, wt remains stable. Surgical incision noted. Spoke with pt who remains with excellent appetite/po intake, consuming most of her meal trays w/o any issues. No further nutritional interventions at this time; RD to reassess pt again in 7 days.

## 2019-08-15 NOTE — PROGRESS NOTE ADULT - ASSESSMENT
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  The patient was also evaluated and treated for a right labial abscess likely related to uncontrolled diabetes.    Fall complicated by left hip fracture: status post ORIF. Continue Tramadol PRN for pain.  Hemoglobin improved to 9.2   WBAT to the LLE.  Will refer to PT as outpatient  Soft stools: improved, off colace.  Right Labial Abscess: status post incision and drainage.  Wound cultures were positive for Candida Albicans.  Status post treatment with Augmentin and Diflucan.  Viral cardiomyopathy/moderate AS: repeat echocardiogram demonstrates normal ejection fraction with moderate AS.  Follow-up with cardiology as outpatient.  Uncontrolled DMII: FS have stabilized from admission.  Continue Metformin 500mg PO q12 and Januvia 50mg PO q24.  Hypertension: continue Losartan 50mg q24 & Norvasc 5mg PO q24  GI/DVT prophylaxis    #Progress Note Handoff    Pending:    Future Disposition: Discharge home; time spent = 35 minutes

## 2019-08-15 NOTE — CHART NOTE - NSCHARTNOTEFT_GEN_A_CORE
<<<RESIDENT DISCHARGE NOTE>>>     GUS WILBURN  MRN-[id number]    VITAL SIGNS:  T(F): 97.6 (08-15-19 @ 07:30), Max: 97.6 (08-15-19 @ 07:30)  HR: 95 (08-15-19 @ 07:30)  BP: 110/59 (08-15-19 @ 07:30)      PHYSICAL EXAMINATION:  GEN: NAD, Resting comfortably in bed  PULM: Clear to auscultation bilaterally, No wheezes  CVS: Regular rate and rhythm, S1-S2, no murmurs  ABD: Soft, non-tender, non-distended, no guarding  EXT: No edema  NEURO: AAOx3, no focal deficits    TEST RESULTS:                        9.2    6.77  )-----------( 352      ( 15 Aug 2019 06:15 )             29.2       08-15    139  |  103  |  15  ----------------------------<  160<H>  4.3   |  19  |  0.7    Ca    10.2<H>      15 Aug 2019 06:15    TPro  6.8  /  Alb  3.8  /  TBili  0.4  /  DBili  x   /  AST  12  /  ALT  7   /  AlkPhos  104  08-15      FINAL DISCHARGE INTERVIEW:  Resident(s) Present: (Name: Dr. Mai)    DISCHARGE MEDICATION RECONCILIATION  reviewed with Attending (Name: Dr. Steiner)    DISPOSITION:   [X] Home,    [  ] Home with Visiting Nursing Services,   [    ]  SNF/ NH,    [   ] Acute Rehab (4A),   [   ] Other (Specify:_________)

## 2019-08-15 NOTE — PROGRESS NOTE ADULT - PROVIDER SPECIALTY LIST ADULT
Hospitalist
Infectious Disease
Internal Medicine
Orthopedics
Surgery
Internal Medicine
Hospitalist
Surgery
Cardiology

## 2019-08-15 NOTE — PROGRESS NOTE ADULT - REASON FOR ADMISSION
mechanical fall

## 2019-08-20 DIAGNOSIS — L02.214 CUTANEOUS ABSCESS OF GROIN: ICD-10-CM
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
## 2019-08-20 DIAGNOSIS — B37.89 OTHER SITES OF CANDIDIASIS: ICD-10-CM

## 2019-08-20 DIAGNOSIS — I25.10 ATHEROSCLEROTIC HEART DISEASE OF NATIVE CORONARY ARTERY WITHOUT ANGINA PECTORIS: ICD-10-CM

## 2019-08-20 DIAGNOSIS — E87.1 HYPO-OSMOLALITY AND HYPONATREMIA: ICD-10-CM

## 2019-08-20 DIAGNOSIS — Z87.891 PERSONAL HISTORY OF NICOTINE DEPENDENCE: ICD-10-CM

## 2019-08-20 DIAGNOSIS — Z91.19 PATIENT'S NONCOMPLIANCE WITH OTHER MEDICAL TREATMENT AND REGIMEN: ICD-10-CM

## 2019-08-20 DIAGNOSIS — I48.0 PAROXYSMAL ATRIAL FIBRILLATION: ICD-10-CM
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
## 2019-08-20 DIAGNOSIS — I10 ESSENTIAL (PRIMARY) HYPERTENSION: ICD-10-CM

## 2019-08-20 DIAGNOSIS — D62 ACUTE POSTHEMORRHAGIC ANEMIA: ICD-10-CM

## 2019-08-20 DIAGNOSIS — S72.22XA DISPLACED SUBTROCHANTERIC FRACTURE OF LEFT FEMUR, INITIAL ENCOUNTER FOR CLOSED FRACTURE: ICD-10-CM

## 2019-08-20 DIAGNOSIS — Z87.898 PERSONAL HISTORY OF OTHER SPECIFIED CONDITIONS: ICD-10-CM

## 2019-08-20 DIAGNOSIS — L03.115 CELLULITIS OF RIGHT LOWER LIMB: ICD-10-CM

## 2019-08-20 DIAGNOSIS — I35.0 NONRHEUMATIC AORTIC (VALVE) STENOSIS: ICD-10-CM

## 2019-08-20 DIAGNOSIS — E11.65 TYPE 2 DIABETES MELLITUS WITH HYPERGLYCEMIA: ICD-10-CM

## 2019-08-20 DIAGNOSIS — F41.9 ANXIETY DISORDER, UNSPECIFIED: ICD-10-CM
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
## 2019-08-29 ENCOUNTER — INPATIENT (INPATIENT)
Facility: HOSPITAL | Age: 69
LOS: 5 days | Discharge: HOME HEALTH | End: 2019-09-04
Attending: INTERNAL MEDICINE | Admitting: INTERNAL MEDICINE
Payer: MEDICARE

## 2019-08-29 VITALS
RESPIRATION RATE: 18 BRPM | DIASTOLIC BLOOD PRESSURE: 67 MMHG | WEIGHT: 125 LBS | HEART RATE: 100 BPM | SYSTOLIC BLOOD PRESSURE: 125 MMHG | OXYGEN SATURATION: 100 % | TEMPERATURE: 97 F

## 2019-08-29 DIAGNOSIS — Z98.890 OTHER SPECIFIED POSTPROCEDURAL STATES: Chronic | ICD-10-CM

## 2019-08-29 LAB
ALBUMIN SERPL ELPH-MCNC: 4 G/DL — SIGNIFICANT CHANGE UP (ref 3.5–5.2)
ALP SERPL-CCNC: 110 U/L — SIGNIFICANT CHANGE UP (ref 30–115)
ALT FLD-CCNC: <5 U/L — SIGNIFICANT CHANGE UP (ref 0–41)
ANION GAP SERPL CALC-SCNC: 17 MMOL/L — HIGH (ref 7–14)
APTT BLD: 31.1 SEC — SIGNIFICANT CHANGE UP (ref 27–39.2)
AST SERPL-CCNC: 10 U/L — SIGNIFICANT CHANGE UP (ref 0–41)
BASOPHILS # BLD AUTO: 0.04 K/UL — SIGNIFICANT CHANGE UP (ref 0–0.2)
BASOPHILS NFR BLD AUTO: 0.3 % — SIGNIFICANT CHANGE UP (ref 0–1)
BILIRUB SERPL-MCNC: 0.3 MG/DL — SIGNIFICANT CHANGE UP (ref 0.2–1.2)
BLD GP AB SCN SERPL QL: SIGNIFICANT CHANGE UP
BUN SERPL-MCNC: 25 MG/DL — HIGH (ref 10–20)
CALCIUM SERPL-MCNC: 10.3 MG/DL — HIGH (ref 8.5–10.1)
CHLORIDE SERPL-SCNC: 102 MMOL/L — SIGNIFICANT CHANGE UP (ref 98–110)
CO2 SERPL-SCNC: 20 MMOL/L — SIGNIFICANT CHANGE UP (ref 17–32)
CREAT SERPL-MCNC: 0.8 MG/DL — SIGNIFICANT CHANGE UP (ref 0.7–1.5)
EOSINOPHIL # BLD AUTO: 0.17 K/UL — SIGNIFICANT CHANGE UP (ref 0–0.7)
EOSINOPHIL NFR BLD AUTO: 1.4 % — SIGNIFICANT CHANGE UP (ref 0–8)
GLUCOSE BLDC GLUCOMTR-MCNC: 170 MG/DL — HIGH (ref 70–99)
GLUCOSE SERPL-MCNC: 140 MG/DL — HIGH (ref 70–99)
HCT VFR BLD CALC: 32.6 % — LOW (ref 37–47)
HGB BLD-MCNC: 10.2 G/DL — LOW (ref 12–16)
IMM GRANULOCYTES NFR BLD AUTO: 0.3 % — SIGNIFICANT CHANGE UP (ref 0.1–0.3)
INR BLD: 1.1 RATIO — SIGNIFICANT CHANGE UP (ref 0.65–1.3)
LYMPHOCYTES # BLD AUTO: 1.95 K/UL — SIGNIFICANT CHANGE UP (ref 1.2–3.4)
LYMPHOCYTES # BLD AUTO: 15.7 % — LOW (ref 20.5–51.1)
MCHC RBC-ENTMCNC: 26.1 PG — LOW (ref 27–31)
MCHC RBC-ENTMCNC: 31.3 G/DL — LOW (ref 32–37)
MCV RBC AUTO: 83.4 FL — SIGNIFICANT CHANGE UP (ref 81–99)
MONOCYTES # BLD AUTO: 1.15 K/UL — HIGH (ref 0.1–0.6)
MONOCYTES NFR BLD AUTO: 9.3 % — SIGNIFICANT CHANGE UP (ref 1.7–9.3)
NEUTROPHILS # BLD AUTO: 9.04 K/UL — HIGH (ref 1.4–6.5)
NEUTROPHILS NFR BLD AUTO: 73 % — SIGNIFICANT CHANGE UP (ref 42.2–75.2)
NRBC # BLD: 0 /100 WBCS — SIGNIFICANT CHANGE UP (ref 0–0)
PLATELET # BLD AUTO: 385 K/UL — SIGNIFICANT CHANGE UP (ref 130–400)
POTASSIUM SERPL-MCNC: 4.3 MMOL/L — SIGNIFICANT CHANGE UP (ref 3.5–5)
POTASSIUM SERPL-SCNC: 4.3 MMOL/L — SIGNIFICANT CHANGE UP (ref 3.5–5)
PROT SERPL-MCNC: 7.1 G/DL — SIGNIFICANT CHANGE UP (ref 6–8)
PROTHROM AB SERPL-ACNC: 12.6 SEC — SIGNIFICANT CHANGE UP (ref 9.95–12.87)
RBC # BLD: 3.91 M/UL — LOW (ref 4.2–5.4)
RBC # FLD: 14.2 % — SIGNIFICANT CHANGE UP (ref 11.5–14.5)
SODIUM SERPL-SCNC: 139 MMOL/L — SIGNIFICANT CHANGE UP (ref 135–146)
TROPONIN T SERPL-MCNC: <0.01 NG/ML — SIGNIFICANT CHANGE UP
WBC # BLD: 12.39 K/UL — HIGH (ref 4.8–10.8)
WBC # FLD AUTO: 12.39 K/UL — HIGH (ref 4.8–10.8)

## 2019-08-29 PROCEDURE — 71045 X-RAY EXAM CHEST 1 VIEW: CPT | Mod: 26

## 2019-08-29 PROCEDURE — 74174 CTA ABD&PLVS W/CONTRAST: CPT | Mod: 26

## 2019-08-29 PROCEDURE — 99285 EMERGENCY DEPT VISIT HI MDM: CPT | Mod: GC

## 2019-08-29 PROCEDURE — 93010 ELECTROCARDIOGRAM REPORT: CPT

## 2019-08-29 RX ORDER — ATORVASTATIN CALCIUM 80 MG/1
20 TABLET, FILM COATED ORAL AT BEDTIME
Refills: 0 | Status: DISCONTINUED | OUTPATIENT
Start: 2019-08-29 | End: 2019-09-04

## 2019-08-29 RX ORDER — LOSARTAN POTASSIUM 100 MG/1
50 TABLET, FILM COATED ORAL DAILY
Refills: 0 | Status: DISCONTINUED | OUTPATIENT
Start: 2019-08-29 | End: 2019-09-03

## 2019-08-29 RX ORDER — AMLODIPINE BESYLATE 2.5 MG/1
5 TABLET ORAL DAILY
Refills: 0 | Status: DISCONTINUED | OUTPATIENT
Start: 2019-08-29 | End: 2019-09-04

## 2019-08-29 RX ORDER — ACETAMINOPHEN 500 MG
650 TABLET ORAL EVERY 6 HOURS
Refills: 0 | Status: DISCONTINUED | OUTPATIENT
Start: 2019-08-29 | End: 2019-09-04

## 2019-08-29 RX ORDER — CHLORHEXIDINE GLUCONATE 213 G/1000ML
1 SOLUTION TOPICAL
Refills: 0 | Status: DISCONTINUED | OUTPATIENT
Start: 2019-08-29 | End: 2019-09-04

## 2019-08-29 RX ORDER — POLYETHYLENE GLYCOL 3350 17 G/17G
17 POWDER, FOR SOLUTION ORAL DAILY
Refills: 0 | Status: DISCONTINUED | OUTPATIENT
Start: 2019-08-29 | End: 2019-08-31

## 2019-08-29 RX ORDER — SENNA PLUS 8.6 MG/1
2 TABLET ORAL AT BEDTIME
Refills: 0 | Status: DISCONTINUED | OUTPATIENT
Start: 2019-08-29 | End: 2019-09-04

## 2019-08-29 RX ORDER — MULTIVIT WITH MIN/MFOLATE/K2 340-15/3 G
1 POWDER (GRAM) ORAL ONCE
Refills: 0 | Status: COMPLETED | OUTPATIENT
Start: 2019-08-29 | End: 2019-08-29

## 2019-08-29 RX ADMIN — Medication 1 BOTTLE: at 21:52

## 2019-08-29 RX ADMIN — ATORVASTATIN CALCIUM 20 MILLIGRAM(S): 80 TABLET, FILM COATED ORAL at 21:55

## 2019-08-29 RX ADMIN — POLYETHYLENE GLYCOL 3350 17 GRAM(S): 17 POWDER, FOR SOLUTION ORAL at 21:11

## 2019-08-29 RX ADMIN — SENNA PLUS 2 TABLET(S): 8.6 TABLET ORAL at 21:55

## 2019-08-29 NOTE — ED ADULT NURSE NOTE - OBJECTIVE STATEMENT
Pt a&ox3, pt c/o rectal bleeding bright red beginning this morning x2 times. Pt states c/o rectal sharp pain when having BM. Pt states she was previously constipated 2/2 opoid use for hip pain, recent hip repair. Pt denies abd pain, n/v/d, fever/chills, CP/sOB.

## 2019-08-29 NOTE — ED PROVIDER NOTE - NS ED ROS FT
Constitutional:  No fevers or chills.  Eyes:  No visual changes, eye pain, or discharge.  ENT:  No hearing changes. No sore throat.  Neck:  No neck pain or stiffness.  Cardiac:  +Intermittent twinges of CP.  Resp:  No cough or SOB. No hemoptysis.   GI:  No nausea, vomiting, diarrhea, or abdominal pain. +Constipation and rectal bleeding.  :  No dysuria, frequency, or hematuria.  MSK:  No myalgias or joint pain/swelling.  Neuro:  No headache, dizziness, or weakness.  Skin:  No skin rash.

## 2019-08-29 NOTE — ED PROVIDER NOTE - OBJECTIVE STATEMENT
69yo F with PMH of DM and left hip fracture s/p repair on 08/03/19 done by Dr. Santiago presenting to ED with rectal bleeding x 2 episodes and constipation for the last few weeks. Patient has been taking opioids for pain management and has not had a good BM x 7 days. Able to wipe stool from rectum but not able to pass significant stool. Has external hemorrhoids, applied prep H, and had blood with stool x 1 today PTA and active bleeding in ED. Denies any injuries/traumas/falls, SOB, abd pain, back pain, or hx of GI bleeds/GI problems in the past. Former smoker. Also states she has had mild twinges of intermittent CP which she attributes to her anxiety. Describes as twinges, lasting seconds to minutes, non-radiating, and substernal/left sided. Has had full cardiac w/u by Dr. Carranza for pre-op that was WNL, EF 55%. Patient tried to take stool softeners at home with minimal relief (Colace, Dulcolax, etc) and has been straining to pass stool. 69yo F with PMH of DM and left hip fracture s/p repair on 08/03/19 done by Dr. Santiago presenting to ED with rectal bleeding x 2 episodes and constipation for the last few weeks. Patient has been taking opioids for pain management and has not had a good BM x 7 days. Able to wipe stool from rectum but not able to pass significant stool. Has external hemorrhoids, applied prep H, and had blood with stool x 1 today PTA and active bleeding in ED. Denies any injuries/traumas/falls, SOB, abd pain, back pain, or hx of GI bleeds/GI problems in the past. Former smoker. Also states she has had mild twinges of intermittent CP which she attributes to her anxiety. Describes as twinges, lasting seconds to minutes, non-radiating, and substernal/left sided. Has had full cardiac w/u by Dr. Carranza for pre-op that was WNL, EF 55%. Patient tried to take stool softeners at home with minimal relief (Colace, Dulcolax, suppository, etc) and has been straining to pass stool.

## 2019-08-29 NOTE — H&P ADULT - NSHPLABSRESULTS_GEN_ALL_CORE
10.2   12.39 )-----------( 385      ( 29 Aug 2019 15:30 )             32.6   08-29    139  |  102  |  25<H>  ----------------------------<  140<H>  4.3   |  20  |  0.8    Ca    10.3<H>      29 Aug 2019 15:30    TPro  7.1  /  Alb  4.0  /  TBili  0.3  /  DBili  x   /  AST  10  /  ALT  <5  /  AlkPhos  110  08-29  < from: CT Angio Abdomen and Pelvis w/ IV Cont (08.29.19 @ 17:18) >    1.  No CTA evidence of acute gastrointestinal hemorrhage.  2.  Markedly distended urinary bladder. Correlate for urinary retention.  3.  Indeterminate left upper pole renal lesion. Further evaluation with   contrast-enhanced MRI on a nonemergent basis recommended.  4.  Status post fixation of left femoral fracture. Fracture line remains   visible.

## 2019-08-29 NOTE — H&P ADULT - NSHPPHYSICALEXAM_GEN_ALL_CORE
General: NAD  HENT: NCAT, EOMI  Pulm: CTAB, no distress  CVS: Systolic murmur  GI: SOft, non tender  : Markedly distended bladder  Extremities: NO edema  Neuro: No FND ICU Vital Signs Last 24 Hrs  T(C): 36.9 (30 Aug 2019 13:55), Max: 37.2 (30 Aug 2019 05:49)  T(F): 98.4 (30 Aug 2019 13:55), Max: 99 (30 Aug 2019 05:49)  HR: 94 (30 Aug 2019 13:55) (88 - 104)  BP: 133/64 (30 Aug 2019 13:55) (117/69 - 143/63)  BP(mean): 90 (30 Aug 2019 13:55) (90 - 90)  RR: 18 (30 Aug 2019 13:55) (18 - 20)  SpO2: 99% (29 Aug 2019 21:11) (99% - 99%)    PHYSICAL EXAM:  GENERAL: NAD, speaks in full sentences  HEAD:  Atraumatic, Normocephalic  EYES:  conjunctiva and sclera clear  NECK: Supple, No JVD  CHEST/LUNG: Clear to auscultation bilaterally; No wheeze; No crackles;   HEART: Regular rate and rhythm; No murmurs;   ABDOMEN: Soft, Nontender, Nondistended; Bowel sounds present; No guarding  EXTREMITIES: No cyanosis or edema  PSYCH: AAOx3  NEUROLOGY: non-focal  SKIN: No rashes or lesions

## 2019-08-29 NOTE — H&P ADULT - HISTORY OF PRESENT ILLNESS
68F with PMHx of uncontrolled DM (non-compliant), viral myopathy, recent labial abscess s/p ID and recent admission for fall and hip fracture s/p fixation presents for LGIB bleed. Patient had a fall and surgical repair of hip fracture in early August, after which she occasionally used opioids for pain management had had decreased mobility. Approximately 2 weeks ago she became constipated and has been straining to have bowel movements. Not long after this became aware of  reducible, non-painful hemorrhoids. This morning she noticed bright red blood in the toilet bowl, and then on the way to the ED she states she was "soaked" in bright red. She has never had a colonoscopy.  Of note, patient also c/o sharp chest pain which lasted for a few seconds, not related to exertion, is related to anxiety and states it resolves on its own or with Xanax. Patient had recent echo which revealed moderate AS, was planning for outpatient stress test.   In ED, patient was disimpacted and had large bowel movement. On arrival, afebrile, VSS, hemoglobin greater than previous admissions. CTA AP negative for bleed, however it did show distended bladder. Upon further questioning, patient has had decreased urination and per daughter, bladder prolapse for many years.

## 2019-08-29 NOTE — H&P ADULT - ASSESSMENT
68F with PMHx of uncontrolled DM (non-compliant), viral myopathy, recent labial abscess s/p ID and recent admission for fall and hip fracture s/p fixation presents for LGIB bleed.     LGIB, likely hemorrhoidal due to constipation: Hg stable, VSS  - GI consult for possible colonoscopy (pt request Dr. Solis, never had colonoscopy before)  - Surgery consult  - Monitor CBC  - Sitz bath  - Laxatives  - Hold aspirin    Chest pain: Symptoms appear non-cardiac but due to risk factors patient was admitted to tele  - Monitor CE  - Consider inpatient vs outpatient stress test when bleeding resolves  - Consider cardio (Dr. Carranza)  - Holding aspirin  - C/w statin  - C/w ACEI    HTN:  - ACEI    Constipation: Patient was taking opiod pain meds and immobile following surgery; disimpacted in ED  - Senna  - Miralax  - Tap water enema PRN  - No opioids    Distended bladder with bladder prolapse: Pt initially refusing montejo, agreeing to straight cath for now; per patient's daughter has had prolapse for many years  - Straight cath/montejo if needed  - No hydro or RIC  - Consider uro-gyn consult inpatient vs outpatient referral    DM: Patient was non-compliant, HbA1C ~15; just recently restarted on medications  - Basal/bolus/correction  - MOnitor FS    Renal mass:  - Outpatient MRI    DVT ppx: SCD  Dispo: From home 68F with PMHx of uncontrolled DM (non-compliant), viral myopathy, recent labial abscess s/p ID and recent admission for fall and hip fracture s/p fixation presents for LGIB bleed.     LGIB, likely hemorrhoidal due to constipation/opiod use: Hg stable, VSS  - s/p disimpaction in the ED with significant improvement in symptoms  - GI consult for possible colonoscopy (pt request Dr. Solis, never had colonoscopy before)  - Surgery consult  - Monitor CBC  - Sitz baths and bowel regimen  - Hold aspirin    Distended bladder with bladder prolapse, possibly due to mass effect of constipation versus bladder prolapse  - montejo placed with >1L output  - No hydro or RIC  - awaiting Urine cx  - f/u urology     Chest pain: suspect anxiety related  - CE negative x2, EKG unchanged  - Consider cardio (Dr. Carranza)  - Holding aspirin  - C/w statin  - C/w ACEI    HTN:  - ACEI    DM: Patient was non-compliant, HbA1C ~15; just recently restarted on medications  - Basal/bolus/correction  - MOnitor FS    Renal mass:  - Outpatient MRI    DVT ppx: SCD  Dispo: From home

## 2019-08-29 NOTE — ED PROVIDER NOTE - ATTENDING CONTRIBUTION TO CARE
69 yo f hx dm, s/p recent hip surg, former smoker  pt states she has been on opiates for pain control and has had difficulty passing a BM. pt has tried OTC stool softeners w/o significant relief. pt endorsing streak of blood w/ wiping. pt states she has some anxiety, and sometimes feels twinges of pain. pt had recent full card w/u w/ Ghavami and was cleared.     vss  gen- NAD, aaox3  card-rrr  lungs-ctab, no wheezing or rhonchi  abd-sntnd, no guarding or rebound  neuro- full str/sensation, cn ii-xii grossly intact, normal coordination and gait    likely constipation 2/2 med side effect  pt passing gas and having small BMs, unlikely obstruction  atypical cp/anxiety- will get ekg/cxr/ trop x1  enema, aggressive bowel reg

## 2019-08-29 NOTE — CONSULT NOTE ADULT - SUBJECTIVE AND OBJECTIVE BOX
GUS WILBURN [id number]  68y Female    HPI: Patient is a 64 year old female with a PMH of DM who presented to the ED with 3 days of constipation and hemorrhoidal pain. She states that after her recent hip surgery which was complicated by an abscess in the setting of uncontrolled hyperglycemia she was having to use a large amount of opiate medication to deal with the pain which caused opiate induced constipation. She is now presenting because she was trying to have a bowel movement yesterday and she began to bleed from one of the hemorrhoids. It was a little blood at first then became more, but it eventually stopped and her pain was relieved. She is coming into the ED today for severe constipation and rectal bleeding. In the Ed she is hemodynamically stable and she went for CT that showed a markedly distended bladder which could be contributing to her constipation.         PAST MEDICAL & SURGICAL HISTORY:  Diabetes    MEDICATIONS  (STANDING):    MEDICATIONS  (PRN):      Allergies: No Known Allergies    REVIEW OF SYSTEMS    [x] A ten-point review of systems was otherwise negative except as noted.  [ ] Due to altered mental status/intubation, subjective information were not able to be obtained from the patient. History was obtained, to the extent possible, from review of the chart and collateral sources of information.      Vital Signs Last 24 Hrs  T(C): 36.1 (29 Aug 2019 13:55), Max: 36.1 (29 Aug 2019 13:55)  T(F): 97 (29 Aug 2019 13:55), Max: 97 (29 Aug 2019 13:55)  HR: 100 (29 Aug 2019 13:55) (100 - 100)  BP: 125/67 (29 Aug 2019 13:55) (125/67 - 125/67)  BP(mean): --  RR: 18 (29 Aug 2019 13:55) (18 - 18)  SpO2: 100% (29 Aug 2019 13:55) (100% - 100%)    PHYSICAL EXAM:  GENERAL: NAD, well-appearing  CHEST/LUNG: Clear to auscultation bilaterally  HEART: Regular rate and rhythm  ABDOMEN: Soft,  mildly distended; very mild suprapubic tenderness   Rectal: Palpable 3 column hemorrhoids, stool ball felt but unable to disimpact due to severe pain     LABS:                      10.2   12.39 )-----------( 385      ( 29 Aug 2019 15:30 )             32.6       Auto Neutrophil %: 73.0 % (08-29-19 @ 15:30)  Auto Immature Granulocyte %: 0.3 % (08-29-19 @ 15:30)    08-29    139  |  102  |  25<H>  ----------------------------<  140<H>  4.3   |  20  |  0.8      Calcium, Total Serum: 10.3 mg/dL (08-29-19 @ 15:30)    LFTs:             7.1  | 0.3  | 10       ------------------[110     ( 29 Aug 2019 15:30 )  4.0  | x    | <5          Coags:     12.60  ----< 1.10    ( 29 Aug 2019 15:30 )     31.1      CARDIAC MARKERS ( 29 Aug 2019 15:30 )  x     / <0.01 ng/mL / x     / x     / x        RADIOLOGY & ADDITIONAL STUDIES:  < from: CT Angio Abdomen and Pelvis w/ IV Cont (08.29.19 @ 17:18) >  IMPRESSION:    1.  No CTA evidence of acute gastrointestinal hemorrhage.  2.  Markedly distended urinary bladder. Correlate for urinary retention.  3.  Indeterminate left upper pole renal lesion. Further evaluation with   contrast-enhanced MRI on a nonemergent basis recommended.  4.  Status post fixation of left femoral fracture. Fracture line remains   visible.    < end of copied text >

## 2019-08-29 NOTE — ED PROVIDER NOTE - PHYSICAL EXAMINATION
PHYSICAL EXAM: I have reviewed current vital signs.  GENERAL: NAD, well-nourished; well-developed.  HEAD:  Normocephalic, atraumatic.  EYES: Conjunctiva and sclera clear.  ENT: MMM, no erythema/exudates.  NECK: Supple, full ROM.  CHEST/LUNG: Clear to auscultation bilaterally; no wheezes, rales, or rhonchi.  HEART: Regular rate and rhythm, normal S1 and S2; no murmurs, rubs, or gallops.  ABDOMEN: Soft, nontender, nondistended.  RECTAL: Chaperone present Dr. Allison, multiple external hemorrhoids, brown stool, active scant amount of maroon blood.  EXTREMITIES:  2+ peripheral pulses; no edema.  MSK:  Well healed surgical scar to left hip.  PSYCH: Cooperative, appropriate, normal mood and affect.  NEUROLOGY: A&O x 3. Motor 5/5. No focal neurological deficits.   SKIN: Warm and dry.

## 2019-08-29 NOTE — CONSULT NOTE ADULT - ASSESSMENT
64F with constipation and hemorrhoidal bleeding     Plan:  Sitz bath TID for rectal pain   Tap water enema q12   GI consult for possible colonoscopy  Castellon catheter for bladder retention    Surgery will follow

## 2019-08-29 NOTE — ED PROVIDER NOTE - CLINICAL SUMMARY MEDICAL DECISION MAKING FREE TEXT BOX
pt here for multiple complaints- BRBPR as well as CP. in ed, pt w/ large bloody bm. mild anemia, cta ap w/o acute source of bleed. ekg suggetive of lvh, trop neg x1. will admit for further w/u, gi eval, monitoring

## 2019-08-29 NOTE — ED PROVIDER NOTE - PROGRESS NOTE DETAILS
Patient re-evaluated. Bleeding improved. Informed patient of lab and radiology results. Patient with left  1.5 cm indeterminate hypoattenuating left renal lesion. Will recommend patient have it followed outpatient. Patient given copies of lab/radiology results to patient/family. Enema ordered. Signed out to MAR.

## 2019-08-29 NOTE — ED PROVIDER NOTE - EKG ADDITIONAL QUESTION - PERFORMED INDEPENDENT VISUALIZATION
Leelee is on the phone with Dr Fu at this time.     Franca Thurman  02/22/19 0015    
Statement Selected
Yes

## 2019-08-29 NOTE — ED PROVIDER NOTE - CARE PLAN
Principal Discharge DX:	External hemorrhoids Principal Discharge DX:	External hemorrhoids  Secondary Diagnosis:	Rectal bleed  Secondary Diagnosis:	Chest pain  Secondary Diagnosis:	Leukocytosis

## 2019-08-29 NOTE — H&P ADULT - ATTENDING COMMENTS
Patient is seen and examined independently at bedside. I agree with the resident's note, history and plan as above. Corrections made where appropriate    All labs, radiologic studies, vitals, orders and medications list reviewed.     #Progress Note Handoff  Pending (specify):   GI eval, urology f/u  Family discussion: Plan of care discussed with patient, aware and agreeable   Disposition:  home with VNS

## 2019-08-30 ENCOUNTER — TRANSCRIPTION ENCOUNTER (OUTPATIENT)
Age: 69
End: 2019-08-30

## 2019-08-30 DIAGNOSIS — R33.9 RETENTION OF URINE, UNSPECIFIED: ICD-10-CM

## 2019-08-30 LAB
ALBUMIN SERPL ELPH-MCNC: 3.9 G/DL — SIGNIFICANT CHANGE UP (ref 3.5–5.2)
ALP SERPL-CCNC: 100 U/L — SIGNIFICANT CHANGE UP (ref 30–115)
ALT FLD-CCNC: <5 U/L — SIGNIFICANT CHANGE UP (ref 0–41)
ANION GAP SERPL CALC-SCNC: 14 MMOL/L — SIGNIFICANT CHANGE UP (ref 7–14)
APPEARANCE UR: ABNORMAL
AST SERPL-CCNC: 8 U/L — SIGNIFICANT CHANGE UP (ref 0–41)
BACTERIA # UR AUTO: ABNORMAL
BASOPHILS # BLD AUTO: 0.02 K/UL — SIGNIFICANT CHANGE UP (ref 0–0.2)
BASOPHILS # BLD AUTO: 0.02 K/UL — SIGNIFICANT CHANGE UP (ref 0–0.2)
BASOPHILS NFR BLD AUTO: 0.2 % — SIGNIFICANT CHANGE UP (ref 0–1)
BASOPHILS NFR BLD AUTO: 0.2 % — SIGNIFICANT CHANGE UP (ref 0–1)
BILIRUB SERPL-MCNC: 0.3 MG/DL — SIGNIFICANT CHANGE UP (ref 0.2–1.2)
BILIRUB UR-MCNC: NEGATIVE — SIGNIFICANT CHANGE UP
BUN SERPL-MCNC: 21 MG/DL — HIGH (ref 10–20)
CALCIUM SERPL-MCNC: 10.3 MG/DL — HIGH (ref 8.5–10.1)
CHLORIDE SERPL-SCNC: 102 MMOL/L — SIGNIFICANT CHANGE UP (ref 98–110)
CHOLEST SERPL-MCNC: 153 MG/DL — SIGNIFICANT CHANGE UP (ref 100–200)
CO2 SERPL-SCNC: 24 MMOL/L — SIGNIFICANT CHANGE UP (ref 17–32)
COLOR SPEC: YELLOW — SIGNIFICANT CHANGE UP
CREAT SERPL-MCNC: 0.8 MG/DL — SIGNIFICANT CHANGE UP (ref 0.7–1.5)
DIFF PNL FLD: SIGNIFICANT CHANGE UP
EOSINOPHIL # BLD AUTO: 0.14 K/UL — SIGNIFICANT CHANGE UP (ref 0–0.7)
EOSINOPHIL # BLD AUTO: 0.17 K/UL — SIGNIFICANT CHANGE UP (ref 0–0.7)
EOSINOPHIL NFR BLD AUTO: 1.6 % — SIGNIFICANT CHANGE UP (ref 0–8)
EOSINOPHIL NFR BLD AUTO: 2 % — SIGNIFICANT CHANGE UP (ref 0–8)
EPI CELLS # UR: 0 /HPF — SIGNIFICANT CHANGE UP (ref 0–5)
ESTIMATED AVERAGE GLUCOSE: 197 MG/DL — HIGH (ref 68–114)
FERRITIN SERPL-MCNC: 112 NG/ML — SIGNIFICANT CHANGE UP (ref 15–150)
GLUCOSE BLDC GLUCOMTR-MCNC: 187 MG/DL — HIGH (ref 70–99)
GLUCOSE BLDC GLUCOMTR-MCNC: 202 MG/DL — HIGH (ref 70–99)
GLUCOSE BLDC GLUCOMTR-MCNC: 217 MG/DL — HIGH (ref 70–99)
GLUCOSE BLDC GLUCOMTR-MCNC: 320 MG/DL — HIGH (ref 70–99)
GLUCOSE SERPL-MCNC: 170 MG/DL — HIGH (ref 70–99)
GLUCOSE UR QL: NEGATIVE — SIGNIFICANT CHANGE UP
HBA1C BLD-MCNC: 8.5 % — HIGH (ref 4–5.6)
HCT VFR BLD CALC: 28.3 % — LOW (ref 37–47)
HCT VFR BLD CALC: 28.6 % — LOW (ref 37–47)
HCT VFR BLD CALC: 30.3 % — LOW (ref 37–47)
HDLC SERPL-MCNC: 45 MG/DL — LOW
HGB BLD-MCNC: 8.8 G/DL — LOW (ref 12–16)
HGB BLD-MCNC: 9 G/DL — LOW (ref 12–16)
HGB BLD-MCNC: 9.6 G/DL — LOW (ref 12–16)
HYALINE CASTS # UR AUTO: 0 /LPF — SIGNIFICANT CHANGE UP (ref 0–7)
IMM GRANULOCYTES NFR BLD AUTO: 0.2 % — SIGNIFICANT CHANGE UP (ref 0.1–0.3)
IMM GRANULOCYTES NFR BLD AUTO: 0.4 % — HIGH (ref 0.1–0.3)
IRON SATN MFR SERPL: 12 % — LOW (ref 15–50)
IRON SATN MFR SERPL: 24 UG/DL — LOW (ref 35–150)
KETONES UR-MCNC: NEGATIVE — SIGNIFICANT CHANGE UP
LEUKOCYTE ESTERASE UR-ACNC: ABNORMAL
LIPID PNL WITH DIRECT LDL SERPL: 75 MG/DL — SIGNIFICANT CHANGE UP (ref 4–129)
LYMPHOCYTES # BLD AUTO: 1.24 K/UL — SIGNIFICANT CHANGE UP (ref 1.2–3.4)
LYMPHOCYTES # BLD AUTO: 1.38 K/UL — SIGNIFICANT CHANGE UP (ref 1.2–3.4)
LYMPHOCYTES # BLD AUTO: 13.9 % — LOW (ref 20.5–51.1)
LYMPHOCYTES # BLD AUTO: 16.6 % — LOW (ref 20.5–51.1)
MCHC RBC-ENTMCNC: 26 PG — LOW (ref 27–31)
MCHC RBC-ENTMCNC: 26.5 PG — LOW (ref 27–31)
MCHC RBC-ENTMCNC: 26.5 PG — LOW (ref 27–31)
MCHC RBC-ENTMCNC: 31.1 G/DL — LOW (ref 32–37)
MCHC RBC-ENTMCNC: 31.5 G/DL — LOW (ref 32–37)
MCHC RBC-ENTMCNC: 31.7 G/DL — LOW (ref 32–37)
MCV RBC AUTO: 83.5 FL — SIGNIFICANT CHANGE UP (ref 81–99)
MCV RBC AUTO: 83.7 FL — SIGNIFICANT CHANGE UP (ref 81–99)
MCV RBC AUTO: 84.1 FL — SIGNIFICANT CHANGE UP (ref 81–99)
MONOCYTES # BLD AUTO: 0.78 K/UL — HIGH (ref 0.1–0.6)
MONOCYTES # BLD AUTO: 0.78 K/UL — HIGH (ref 0.1–0.6)
MONOCYTES NFR BLD AUTO: 8.7 % — SIGNIFICANT CHANGE UP (ref 1.7–9.3)
MONOCYTES NFR BLD AUTO: 9.4 % — HIGH (ref 1.7–9.3)
NEUTROPHILS # BLD AUTO: 5.94 K/UL — SIGNIFICANT CHANGE UP (ref 1.4–6.5)
NEUTROPHILS # BLD AUTO: 6.71 K/UL — HIGH (ref 1.4–6.5)
NEUTROPHILS NFR BLD AUTO: 71.6 % — SIGNIFICANT CHANGE UP (ref 42.2–75.2)
NEUTROPHILS NFR BLD AUTO: 75.2 % — SIGNIFICANT CHANGE UP (ref 42.2–75.2)
NITRITE UR-MCNC: NEGATIVE — SIGNIFICANT CHANGE UP
NRBC # BLD: 0 /100 WBCS — SIGNIFICANT CHANGE UP (ref 0–0)
PH UR: 6.5 — SIGNIFICANT CHANGE UP (ref 5–8)
PLATELET # BLD AUTO: 321 K/UL — SIGNIFICANT CHANGE UP (ref 130–400)
PLATELET # BLD AUTO: 321 K/UL — SIGNIFICANT CHANGE UP (ref 130–400)
PLATELET # BLD AUTO: 337 K/UL — SIGNIFICANT CHANGE UP (ref 130–400)
POTASSIUM SERPL-MCNC: 4.5 MMOL/L — SIGNIFICANT CHANGE UP (ref 3.5–5)
POTASSIUM SERPL-SCNC: 4.5 MMOL/L — SIGNIFICANT CHANGE UP (ref 3.5–5)
PROT SERPL-MCNC: 6.9 G/DL — SIGNIFICANT CHANGE UP (ref 6–8)
PROT UR-MCNC: ABNORMAL
RBC # BLD: 3.39 M/UL — LOW (ref 4.2–5.4)
RBC # BLD: 3.4 M/UL — LOW (ref 4.2–5.4)
RBC # BLD: 3.62 M/UL — LOW (ref 4.2–5.4)
RBC # FLD: 14.1 % — SIGNIFICANT CHANGE UP (ref 11.5–14.5)
RBC # FLD: 14.3 % — SIGNIFICANT CHANGE UP (ref 11.5–14.5)
RBC # FLD: 14.3 % — SIGNIFICANT CHANGE UP (ref 11.5–14.5)
RBC CASTS # UR COMP ASSIST: 1 /HPF — SIGNIFICANT CHANGE UP (ref 0–4)
SODIUM SERPL-SCNC: 140 MMOL/L — SIGNIFICANT CHANGE UP (ref 135–146)
SP GR SPEC: 1.02 — SIGNIFICANT CHANGE UP (ref 1.01–1.02)
TIBC SERPL-MCNC: 204 UG/DL — LOW (ref 220–430)
TOTAL CHOLESTEROL/HDL RATIO MEASUREMENT: 3.4 RATIO — LOW (ref 4–5.5)
TRANSFERRIN SERPL-MCNC: 183 MG/DL — LOW (ref 200–360)
TRIGL SERPL-MCNC: 192 MG/DL — HIGH (ref 10–149)
TROPONIN T SERPL-MCNC: <0.01 NG/ML — SIGNIFICANT CHANGE UP
UIBC SERPL-MCNC: 180 UG/DL — SIGNIFICANT CHANGE UP (ref 110–370)
UROBILINOGEN FLD QL: SIGNIFICANT CHANGE UP
WBC # BLD: 8.2 K/UL — SIGNIFICANT CHANGE UP (ref 4.8–10.8)
WBC # BLD: 8.31 K/UL — SIGNIFICANT CHANGE UP (ref 4.8–10.8)
WBC # BLD: 8.93 K/UL — SIGNIFICANT CHANGE UP (ref 4.8–10.8)
WBC # FLD AUTO: 8.2 K/UL — SIGNIFICANT CHANGE UP (ref 4.8–10.8)
WBC # FLD AUTO: 8.31 K/UL — SIGNIFICANT CHANGE UP (ref 4.8–10.8)
WBC # FLD AUTO: 8.93 K/UL — SIGNIFICANT CHANGE UP (ref 4.8–10.8)
WBC UR QL: 368 /HPF — HIGH (ref 0–5)

## 2019-08-30 PROCEDURE — 99223 1ST HOSP IP/OBS HIGH 75: CPT | Mod: AI

## 2019-08-30 PROCEDURE — 76857 US EXAM PELVIC LIMITED: CPT | Mod: 26

## 2019-08-30 PROCEDURE — 99222 1ST HOSP IP/OBS MODERATE 55: CPT

## 2019-08-30 RX ORDER — MULTIVIT WITH MIN/MFOLATE/K2 340-15/3 G
1 POWDER (GRAM) ORAL ONCE
Refills: 0 | Status: COMPLETED | OUTPATIENT
Start: 2019-08-30 | End: 2019-08-30

## 2019-08-30 RX ORDER — ENOXAPARIN SODIUM 100 MG/ML
40 INJECTION SUBCUTANEOUS DAILY
Refills: 0 | Status: DISCONTINUED | OUTPATIENT
Start: 2019-08-30 | End: 2019-09-04

## 2019-08-30 RX ORDER — LANOLIN ALCOHOL/MO/W.PET/CERES
5 CREAM (GRAM) TOPICAL AT BEDTIME
Refills: 0 | Status: DISCONTINUED | OUTPATIENT
Start: 2019-08-30 | End: 2019-09-04

## 2019-08-30 RX ADMIN — SENNA PLUS 2 TABLET(S): 8.6 TABLET ORAL at 21:06

## 2019-08-30 RX ADMIN — LOSARTAN POTASSIUM 50 MILLIGRAM(S): 100 TABLET, FILM COATED ORAL at 07:39

## 2019-08-30 RX ADMIN — Medication 5 MILLIGRAM(S): at 21:06

## 2019-08-30 RX ADMIN — AMLODIPINE BESYLATE 5 MILLIGRAM(S): 2.5 TABLET ORAL at 07:39

## 2019-08-30 RX ADMIN — ATORVASTATIN CALCIUM 20 MILLIGRAM(S): 80 TABLET, FILM COATED ORAL at 21:06

## 2019-08-30 RX ADMIN — ENOXAPARIN SODIUM 40 MILLIGRAM(S): 100 INJECTION SUBCUTANEOUS at 12:37

## 2019-08-30 NOTE — CONSULT NOTE ADULT - SUBJECTIVE AND OBJECTIVE BOX
Gastroenterology Consultation:    68y  year old Female with a chief complaint of : Patient is a 68y old  Female who presents with a chief complaint of hemorrhoidal bleed, chest pain (30 Aug 2019 15:01)      HPI:  68F with PMHx of uncontrolled DM (non-compliant), viral myopathy, recent labial abscess s/p ID and recent admission for fall and hip fracture s/p fixation presents for LGIB bleed. Patient had a fall and surgical repair of hip fracture in early August, after which she occasionally used opioids for pain management had had decreased mobility. Approximately 2 weeks ago she became constipated and has been straining to have bowel movements. Not long after this became aware of  reducible, non-painful hemorrhoids. This morning she noticed bright red blood in the toilet bowl, and then on the way to the ED she states she was "soaked" in bright red. She has never had a colonoscopy.  Of note, patient also c/o sharp chest pain which lasted for a few seconds, not related to exertion, is related to anxiety and states it resolves on its own or with Xanax. Patient had recent echo which revealed moderate AS, was planning for outpatient stress test.   In ED, patient was disimpacted and had large bowel movement. On arrival, afebrile, VSS, hemoglobin greater than previous admissions. CTA AP negative for bleed, however it did show distended bladder. Upon further questioning, patient has had decreased urination and per daughter, bladder prolapse for many years. (29 Aug 2019 20:24)        Review of Systems:   Constitutional:  No Fever, No Chills  ENT/Mouth:  No Hearing Changes,   No Swallowing Difficulty   Eyes:  No Eye Pain, No Vision Changes  Cardiovascular:  Chest Pain  Respiratory:  No Cough, No Dyspnea  Gastrointestinal:  As described in HPI  Musculoskeletal:  No Joint Swelling, No Back Pain  Skin:  No Skin Lesions, No Jaundice  Neuro:  No Syncope, No Dizziness  Heme/Lymph:  No Bruising, No Bleeding.    PMHX  PAST MEDICAL & SURGICAL HISTORY:  Female bladder prolapse  Diabetes  History of repair of hip fracture      FAMILY HISTORY:  FH: myocardial infarction  FH: stomach cancer      Social History:  Tobacco:  Alcohol:  Drugs:    Home Medications:  aspirin 81 mg oral tablet: 1 tab(s) orally once a day (29 Aug 2019 20:19)    MEDICATIONS  (STANDING):  amLODIPine   Tablet 5 milliGRAM(s) Oral daily  atorvastatin 20 milliGRAM(s) Oral at bedtime  chlorhexidine 4% Liquid 1 Application(s) Topical <User Schedule>  enoxaparin Injectable 40 milliGRAM(s) SubCutaneous daily  losartan 50 milliGRAM(s) Oral daily  melatonin 5 milliGRAM(s) Oral at bedtime  polyethylene glycol 3350 17 Gram(s) Oral daily  saline laxative (FLEET) Rectal Enema 1 Enema Rectal Once  senna 2 Tablet(s) Oral at bedtime    MEDICATIONS  (PRN):  acetaminophen   Tablet .. 650 milliGRAM(s) Oral every 6 hours PRN Moderate Pain (4 - 6)      No Known Allergies        Physical Examination:  T(C): 36.9 (08-30-19 @ 13:55), Max: 37.2 (08-30-19 @ 05:49)  HR: 94 (08-30-19 @ 13:55) (88 - 104)  BP: 133/64 (08-30-19 @ 13:55) (117/69 - 143/63)  RR: 18 (08-30-19 @ 13:55) (18 - 20)  SpO2: 99% (08-29-19 @ 21:11) (99% - 99%)  Constitutional: No acute distress.  Eyes:. Conjunctivae are clear, Sclera is non-icteric.  Ears Nose and Throat: The external ears are normal appearing,  Oral mucosa is pink and moist.  Neck: The neck is supple. Trachea is midline.  Respiratory:  No signs of respiratory distress. Lung sounds are clear bilaterally.  Cardiovascular:  S1 S2, Regular rate and rhythm.  Abdominal: Abdomen is soft, symmetric, and non-tender without distention. There are no visible lesions or scars. Bowel sounds are present and normoactive in all four quadrants. No masses, hepatomegaly, or splenomegaly are noted.   Skin: No rashes, No Jaundice.  Head: normocephalic, atraumatic.        Data:                        9.6    8.31  )-----------( 337      ( 30 Aug 2019 07:16 )             30.3     08-30    140  |  102  |  21<H>  ----------------------------<  170<H>  4.5   |  24  |  0.8    Ca    10.3<H>      30 Aug 2019 07:16    TPro  6.9  /  Alb  3.9  /  TBili  0.3  /  DBili  x   /  AST  8   /  ALT  <5  /  AlkPhos  100  08-30    LIVER FUNCTIONS - ( 30 Aug 2019 07:16 )  Alb: 3.9 g/dL / Pro: 6.9 g/dL / ALK PHOS: 100 U/L / ALT: <5 U/L / AST: 8 U/L / GGT: x           PT/INR - ( 29 Aug 2019 15:30 )   PT: 12.60 sec;   INR: 1.10 ratio         PTT - ( 29 Aug 2019 15:30 )  PTT:31.1 sec      Radiology:      EGD/Colon: Never had done before. Gastroenterology Consultation:    68y  year old Female with a chief complaint of : Patient is a 68y old  Female who presents with a chief complaint of hemorrhoidal bleed, chest pain (30 Aug 2019 15:01)      HPI:  68F with PMHx of uncontrolled DM (non-compliant), viral myopathy, recent labial abscess s/p ID and recent admission for fall and hip fracture s/p fixation presents for LGIB bleed. Patient had a fall and surgical repair of hip fracture in early August, after which she occasionally used opioids for pain management had had decreased mobility. Approximately 2 weeks ago she became constipated and has been straining to have bowel movements. Not long after this became aware of  reducible, non-painful hemorrhoids. This morning she noticed bright red blood in the toilet bowl, and then on the way to the ED she states she was "soaked" in bright red. She has never had a colonoscopy.  Of note, patient also c/o sharp chest pain which lasted for a few seconds, not related to exertion, is related to anxiety and states it resolves on its own or with Xanax. Patient had recent echo which revealed moderate AS, was planning for outpatient stress test.   In ED, patient was disimpacted and had large bowel movement. On arrival, afebrile, VSS, hemoglobin greater than previous admissions. CTA AP negative for bleed, however it did show distended bladder. Upon further questioning, patient has had decreased urination and per daughter, bladder prolapse for many years. (29 Aug 2019 20:24)        Review of Systems:   Constitutional:  No Fever, No Chills  ENT/Mouth:  No Hearing Changes,   No Swallowing Difficulty   Eyes:  No Eye Pain, No Vision Changes  Cardiovascular:  Chest Pain  Respiratory:  No Cough, No Dyspnea  Gastrointestinal:  As described in HPI  Musculoskeletal:  No Joint Swelling, No Back Pain  Skin:  No Skin Lesions, No Jaundice  Neuro:  No Syncope, No Dizziness  Heme/Lymph:  No Bruising, No Bleeding.    PMHX  PAST MEDICAL & SURGICAL HISTORY:  Female bladder prolapse  Diabetes  History of repair of hip fracture      FAMILY HISTORY:  FH: myocardial infarction  FH: stomach cancer      Social History:  Tobacco:  Alcohol:  Drugs:    Home Medications:  aspirin 81 mg oral tablet: 1 tab(s) orally once a day (29 Aug 2019 20:19)    MEDICATIONS  (STANDING):  amLODIPine   Tablet 5 milliGRAM(s) Oral daily  atorvastatin 20 milliGRAM(s) Oral at bedtime  chlorhexidine 4% Liquid 1 Application(s) Topical <User Schedule>  enoxaparin Injectable 40 milliGRAM(s) SubCutaneous daily  losartan 50 milliGRAM(s) Oral daily  melatonin 5 milliGRAM(s) Oral at bedtime  polyethylene glycol 3350 17 Gram(s) Oral daily  saline laxative (FLEET) Rectal Enema 1 Enema Rectal Once  senna 2 Tablet(s) Oral at bedtime    MEDICATIONS  (PRN):  acetaminophen   Tablet .. 650 milliGRAM(s) Oral every 6 hours PRN Moderate Pain (4 - 6)      No Known Allergies        Physical Examination:  T(C): 36.9 (08-30-19 @ 13:55), Max: 37.2 (08-30-19 @ 05:49)  HR: 94 (08-30-19 @ 13:55) (88 - 104)  BP: 133/64 (08-30-19 @ 13:55) (117/69 - 143/63)  RR: 18 (08-30-19 @ 13:55) (18 - 20)  SpO2: 99% (08-29-19 @ 21:11) (99% - 99%)  Constitutional: No acute distress.  Eyes:. Conjunctivae are clear, Sclera is non-icteric.  Ears Nose and Throat: The external ears are normal appearing,  Oral mucosa is pink and moist.  Neck: The neck is supple. Trachea is midline.  Respiratory:  No signs of respiratory distress. Lung sounds are clear bilaterally.  Cardiovascular:  S1 S2, Regular rate and rhythm.  Abdominal: Abdomen is soft, symmetric, and non-tender without distention. There are no visible lesions or scars. Bowel sounds are present and normoactive in all four quadrants. No masses, hepatomegaly, or splenomegaly are noted.   Skin: No rashes, No Jaundice.  Head: normocephalic, atraumatic.        Data:                        9.6    8.31  )-----------( 337      ( 30 Aug 2019 07:16 )             30.3     08-30    140  |  102  |  21<H>  ----------------------------<  170<H>  4.5   |  24  |  0.8    Ca    10.3<H>      30 Aug 2019 07:16    TPro  6.9  /  Alb  3.9  /  TBili  0.3  /  DBili  x   /  AST  8   /  ALT  <5  /  AlkPhos  100  08-30    LIVER FUNCTIONS - ( 30 Aug 2019 07:16 )  Alb: 3.9 g/dL / Pro: 6.9 g/dL / ALK PHOS: 100 U/L / ALT: <5 U/L / AST: 8 U/L / GGT: x           PT/INR - ( 29 Aug 2019 15:30 )   PT: 12.60 sec;   INR: 1.10 ratio         PTT - ( 29 Aug 2019 15:30 )  PTT:31.1 sec      Radiology:  CT angio/abdomen: LOWER CHEST: Normal size heart. Dependent atelectasis.    HEPATOBILIARY:  Cholelithiasis in a distended gallbladder. Subcentimeter   right hepatic hypodensity to small to characterize.    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    PANCREAS: Unremarkable.    ADRENAL GLANDS: Unremarkable.    KIDNEYS: 1.5 cm indeterminate hypoattenuating left renal lesion (series 7   image 43) measuring higher than simple fluid in attenuation even on   noncontrast images. Additional hypodensities too small to characterize.   Bilateral extrarenal pelves. No hydronephrosis.    ABDOMINOPELVIC NODES: No lymphadenopathy.    PELVIC ORGANS: Markedly distended urinary bladder.    PERITONEUM/MESENTERY/BOWEL: No CT evidence of contrast extravasation into   the GI tract. Moderate stool in the colon. No bowel obstruction, ascites   or intraperitoneal free air.    BONES/SOFT TISSUES: Status post left intramedullary nail for   intertrochanteric fracture. Fracture line remains visible.      EGD/Colon: Never had done before. Gastroenterology Consultation:    68y  year old Female with a chief complaint of : Patient is a 68y old  Female who presents with a chief complaint of hemorrhoidal bleed, chest pain (30 Aug 2019 15:01)      HPI:  68F with PMHx of uncontrolled DM (non-compliant), viral myopathy, recent labial abscess s/p ID and recent admission for fall and hip fracture s/p fixation presents for LGIB bleed X 1 episode. Patient had a fall and surgical repair of hip fracture in early August, after which she occasionally used opioids for pain management had had decreased mobility. Approximately 2 weeks ago she became constipated and has been straining to have bowel movements. Not long after this became aware of  reducible, non-painful hemorrhoids. This morning she noticed bright red blood in the toilet bowl, and then on the way to the ED she states she was "soaked" in bright red. She has never had a colonoscopy.  Of note, patient also c/o sharp chest pain which lasted for a few seconds, not related to exertion, is related to anxiety and states it resolves on its own or with Xanax. Patient had recent echo which revealed moderate AS, was planning for outpatient stress test.   In ED, patient was disimpacted and had large bowel movement. On arrival, afebrile, VSS, hemoglobin greater than previous admissions. CTA AP negative for bleed, however it did show distended bladder. Upon further questioning, patient has had decreased urination and per daughter, bladder prolapse for many years.        Review of Systems:   Constitutional:  No Fever, No Chills  ENT/Mouth:  No Hearing Changes,   No Swallowing Difficulty   Eyes:  No Eye Pain, No Vision Changes  Cardiovascular:  Chest Pain  Respiratory:  No Cough, No Dyspnea  Gastrointestinal:  As described in HPI  Musculoskeletal:  No Joint Swelling, No Back Pain  Skin:  No Skin Lesions, No Jaundice  Neuro:  No Syncope, No Dizziness  Heme/Lymph:  No Bruising, No Bleeding.    PMHX  PAST MEDICAL & SURGICAL HISTORY:  Female bladder prolapse  Diabetes  History of repair of hip fracture      FAMILY HISTORY:  FH: myocardial infarction  FH: stomach cancer      Social History:  Tobacco: Ex smoker  Alcohol: Denies  Recreational drugs: Denies    Home Medications:  aspirin 81 mg oral tablet: 1 tab(s) orally once a day (29 Aug 2019 20:19)    MEDICATIONS  (STANDING):  amLODIPine   Tablet 5 milliGRAM(s) Oral daily  atorvastatin 20 milliGRAM(s) Oral at bedtime  chlorhexidine 4% Liquid 1 Application(s) Topical <User Schedule>  enoxaparin Injectable 40 milliGRAM(s) SubCutaneous daily  losartan 50 milliGRAM(s) Oral daily  melatonin 5 milliGRAM(s) Oral at bedtime  polyethylene glycol 3350 17 Gram(s) Oral daily  saline laxative (FLEET) Rectal Enema 1 Enema Rectal Once  senna 2 Tablet(s) Oral at bedtime    MEDICATIONS  (PRN):  acetaminophen   Tablet .. 650 milliGRAM(s) Oral every 6 hours PRN Moderate Pain (4 - 6)      No Known Allergies        Physical Examination:  T(C): 36.9 (08-30-19 @ 13:55), Max: 37.2 (08-30-19 @ 05:49)  HR: 94 (08-30-19 @ 13:55) (88 - 104)  BP: 133/64 (08-30-19 @ 13:55) (117/69 - 143/63)  RR: 18 (08-30-19 @ 13:55) (18 - 20)  SpO2: 99% (08-29-19 @ 21:11) (99% - 99%)  Constitutional: No acute distress.  Eyes : Conjunctivae are clear, Sclera is non-icteric.  Ears Nose and Throat: The external ears are normal appearing,  Oral mucosa is pink and moist.  Neck: The neck is supple. Trachea is midline.  Respiratory:  No signs of respiratory distress. Lung sounds are clear bilaterally.  Cardiovascular:  S1 S2, Regular rate and rhythm.  Abdominal: Abdomen is soft, symmetric, and non-tender without distention. There are no visible lesions or scars. Bowel sounds are present and normoactive in all four quadrants. No masses, hepatomegaly, or splenomegaly are noted.   Skin: No rashes, No Jaundice.  Head: normocephalic, atraumatic.        Data:                        9.6    8.31  )-----------( 337      ( 30 Aug 2019 07:16 )             30.3     08-30    140  |  102  |  21<H>  ----------------------------<  170<H>  4.5   |  24  |  0.8    Ca    10.3<H>      30 Aug 2019 07:16    TPro  6.9  /  Alb  3.9  /  TBili  0.3  /  DBili  x   /  AST  8   /  ALT  <5  /  AlkPhos  100  08-30    LIVER FUNCTIONS - ( 30 Aug 2019 07:16 )  Alb: 3.9 g/dL / Pro: 6.9 g/dL / ALK PHOS: 100 U/L / ALT: <5 U/L / AST: 8 U/L / GGT: x           PT/INR - ( 29 Aug 2019 15:30 )   PT: 12.60 sec;   INR: 1.10 ratio         PTT - ( 29 Aug 2019 15:30 )  PTT:31.1 sec      Radiology:  CT angio/abdomen: LOWER CHEST: Normal size heart. Dependent atelectasis.    HEPATOBILIARY:  Cholelithiasis in a distended gallbladder. Subcentimeter   right hepatic hypodensity to small to characterize.    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    PANCREAS: Unremarkable.    ADRENAL GLANDS: Unremarkable.    KIDNEYS: 1.5 cm indeterminate hypoattenuating left renal lesion (series 7   image 43) measuring higher than simple fluid in attenuation even on   noncontrast images. Additional hypodensities too small to characterize.   Bilateral extrarenal pelves. No hydronephrosis.    ABDOMINOPELVIC NODES: No lymphadenopathy.    PELVIC ORGANS: Markedly distended urinary bladder.    PERITONEUM/MESENTERY/BOWEL: No CT evidence of contrast extravasation into   the GI tract. Moderate stool in the colon. No bowel obstruction, ascites   or intraperitoneal free air.    BONES/SOFT TISSUES: Status post left intramedullary nail for   intertrochanteric fracture. Fracture line remains visible.      EGD/Colon: Never had done before. Gastroenterology Consultation:    68y  year old Female with a chief complaint of : Patient is a 68y old  Female who presents with a chief complaint of hemorrhoidal bleed, chest pain (30 Aug 2019 15:01)      HPI:  68F with PMHx of uncontrolled DM (non-compliant), viral myopathy, recent labial abscess s/p ID and recent admission for fall and hip fracture s/p fixation presents for LGIB bleed X 2 episode. Patient had a fall and surgical repair of hip fracture in early August, after which she occasionally used opioids for pain management had had decreased mobility. Approximately 2 weeks ago she became constipated and has been straining to have bowel movements. Not long after this became aware of  reducible, non-painful hemorrhoids. This morning she noticed bright red blood in the toilet bowl, and then on the way to the ED she states she was "soaked" in bright red. She has never had a colonoscopy.    In ED, patient was disimpacted and had large bowel movement. On arrival, afebrile, VSS, hemoglobin greater than previous admissions. CTA AP negative for bleed, however it did show distended bladder.         Review of Systems:   Constitutional:  No Fever, No Chills  ENT/Mouth:  No Hearing Changes,   No Swallowing Difficulty   Eyes:  No Eye Pain, No Vision Changes  Cardiovascular:  Chest Pain  Respiratory:  No Cough, No Dyspnea  Gastrointestinal:  As described in HPI  Musculoskeletal:  No Joint Swelling, No Back Pain  Skin:  No Skin Lesions, No Jaundice  Neuro:  No Syncope, No Dizziness  Heme/Lymph:  No Bruising, No Bleeding.    PMHX  PAST MEDICAL & SURGICAL HISTORY:  Female bladder prolapse  Diabetes  History of repair of hip fracture      FAMILY HISTORY:  FH: myocardial infarction  FH: stomach cancer      Social History:  Tobacco: Ex smoker  Alcohol: Denies  Recreational drugs: Denies    Home Medications:  aspirin 81 mg oral tablet: 1 tab(s) orally once a day (29 Aug 2019 20:19)    MEDICATIONS  (STANDING):  amLODIPine   Tablet 5 milliGRAM(s) Oral daily  atorvastatin 20 milliGRAM(s) Oral at bedtime  chlorhexidine 4% Liquid 1 Application(s) Topical <User Schedule>  enoxaparin Injectable 40 milliGRAM(s) SubCutaneous daily  losartan 50 milliGRAM(s) Oral daily  melatonin 5 milliGRAM(s) Oral at bedtime  polyethylene glycol 3350 17 Gram(s) Oral daily  saline laxative (FLEET) Rectal Enema 1 Enema Rectal Once  senna 2 Tablet(s) Oral at bedtime    MEDICATIONS  (PRN):  acetaminophen   Tablet .. 650 milliGRAM(s) Oral every 6 hours PRN Moderate Pain (4 - 6)      No Known Allergies        Physical Examination:  T(C): 36.9 (08-30-19 @ 13:55), Max: 37.2 (08-30-19 @ 05:49)  HR: 94 (08-30-19 @ 13:55) (88 - 104)  BP: 133/64 (08-30-19 @ 13:55) (117/69 - 143/63)  RR: 18 (08-30-19 @ 13:55) (18 - 20)  SpO2: 99% (08-29-19 @ 21:11) (99% - 99%)  Constitutional: No acute distress.  Eyes : Conjunctivae are clear, Sclera is non-icteric.  Ears Nose and Throat: The external ears are normal appearing,  Oral mucosa is pink and moist.  Neck: The neck is supple. Trachea is midline.  Respiratory:  No signs of respiratory distress. Lung sounds are clear bilaterally.  Cardiovascular:  S1 S2, Regular rate and rhythm.  Abdominal: Abdomen is soft, symmetric, and non-tender without distention. There are no visible lesions or scars. Bowel sounds are present and normoactive in all four quadrants. No masses, hepatomegaly, or splenomegaly are noted.   Skin: No rashes, No Jaundice.  Head: normocephalic, atraumatic.        Data:                        9.6    8.31  )-----------( 337      ( 30 Aug 2019 07:16 )             30.3     08-30    140  |  102  |  21<H>  ----------------------------<  170<H>  4.5   |  24  |  0.8    Ca    10.3<H>      30 Aug 2019 07:16    TPro  6.9  /  Alb  3.9  /  TBili  0.3  /  DBili  x   /  AST  8   /  ALT  <5  /  AlkPhos  100  08-30    LIVER FUNCTIONS - ( 30 Aug 2019 07:16 )  Alb: 3.9 g/dL / Pro: 6.9 g/dL / ALK PHOS: 100 U/L / ALT: <5 U/L / AST: 8 U/L / GGT: x           PT/INR - ( 29 Aug 2019 15:30 )   PT: 12.60 sec;   INR: 1.10 ratio         PTT - ( 29 Aug 2019 15:30 )  PTT:31.1 sec      Radiology:  CT angio/abdomen: LOWER CHEST: Normal size heart. Dependent atelectasis.    HEPATOBILIARY:  Cholelithiasis in a distended gallbladder. Subcentimeter   right hepatic hypodensity to small to characterize.    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    PANCREAS: Unremarkable.    ADRENAL GLANDS: Unremarkable.    KIDNEYS: 1.5 cm indeterminate hypoattenuating left renal lesion (series 7   image 43) measuring higher than simple fluid in attenuation even on   noncontrast images. Additional hypodensities too small to characterize.   Bilateral extrarenal pelves. No hydronephrosis.    ABDOMINOPELVIC NODES: No lymphadenopathy.    PELVIC ORGANS: Markedly distended urinary bladder.    PERITONEUM/MESENTERY/BOWEL: No CT evidence of contrast extravasation into   the GI tract. Moderate stool in the colon. No bowel obstruction, ascites   or intraperitoneal free air.    BONES/SOFT TISSUES: Status post left intramedullary nail for   intertrochanteric fracture. Fracture line remains visible.      EGD/Colon: Never had done before.

## 2019-08-30 NOTE — DISCHARGE NOTE NURSING/CASE MANAGEMENT/SOCIAL WORK - PATIENT PORTAL LINK FT
You can access the FollowMyHealth Patient Portal offered by [facility] by registering at the following website: [email or web address]. By joining "[employer]’s FollowMyHealth portal, you will also be able to view your health information using other applications (apps) compatible with our system.

## 2019-08-30 NOTE — CONSULT NOTE ADULT - PROBLEM SELECTOR RECOMMENDATION 9
Keep montejo catheter in place for now  Send Urine for culture and sensitivityTremaine d/w Attending  Will follow

## 2019-08-30 NOTE — CONSULT NOTE ADULT - ASSESSMENT
68F with PMHx of uncontrolled DM (non-compliant), viral myopathy, recent labial abscess s/p ID, recent hip fracture in Aug 2019 s/p Sx was on opioids, presents with constipation for the last 3 weeks and 2 episodes of BRBPR (teaspoon) along with constipation.    # BRBPR: Likely hemorrhoidal bleed vs anorectal pathology but R/O AVM vs diverticulosis and R/O colon cancer  Not actively bleeding.  Hg stable  Hemodynamically stable    Rec:  Maintain Active Type and Screen   Closely monitor vital signs.  Trend Hb and Keep it > 8, transfuse PRBC if necessary.  Patient never had a colonoscopy before, but refuses to undergo as inpatient. She wants to do as outpatient with Dr. barnes.  c/w sitz bath, avoid constipation.  will discuss the final plan with my attending. 68F with PMHx of uncontrolled DM (non-compliant), viral myopathy, recent labial abscess s/p ID, recent hip fracture in Aug 2019 s/p Sx was on opioids, presents with constipation for the last 3 weeks and 2 episodes of BRBPR (teaspoon) along with constipation. Patient also has Moderate AS and is suppose to go for stress test with Dr. Carranza as outpatient.    # BRBPR: Likely hemorrhoidal bleed vs anorectal pathology but R/O AVM vs diverticulosis and R/O colon cancer  Not actively bleeding.  Hg stable  Hemodynamically stable    Rec:  Maintain Active Type and Screen   Closely monitor vital signs.  Trend Hb and Keep it > 8, transfuse PRBC if necessary.  Patient never had a colonoscopy before, but refuses to undergo as inpatient. She wants to do as outpatient with Dr. barnes.  c/w sitz bath, avoid constipation.  will discuss the final plan with my attending. 68F with PMHx of uncontrolled DM (non-compliant), viral myopathy, recent labial abscess s/p ID, recent hip fracture in Aug 2019 s/p Sx was on opioids, presents with constipation for the last 3 weeks and 2 episodes of BRBPR (teaspoon) along with constipation. Patient also has Moderate AS and is suppose to go for stress test with Dr. Carranza as outpatient.    # BRBPR: Likely hemorrhoidal bleed vs anorectal pathology but R/O AVM vs diverticulosis and R/O colon cancer  Not actively bleeding.  Hg stable.  Hemodynamically stable    Rec:  Maintain Active Type and Screen   Closely monitor vital signs.  Trend Hb and Keep it > 8, transfuse PRBC if necessary.  Patient never had a colonoscopy before.  Will recommend Dr. Solis to see her on Tuesday and schedule for colonoscopy as inpatient and not outpatient.  c/w sitz bath, avoid constipation.  Laxative regimen: miralax TID, senna at night, colace 100mg TID and tap water enemas TID. 68F with PMHx of uncontrolled DM (non-compliant), viral myopathy, recent labial abscess s/p ID, recent hip fracture in Aug 2019 s/p Sx was on opioids, presents with constipation for the last 3 weeks and 2 episodes of BRBPR (teaspoon) along with constipation. Patient also has Moderate AS and is suppose to go for stress test with Dr. Carranza as outpatient.    # BRBPR: Likely hemorrhoidal bleed vs anorectal pathology but R/O AVM vs diverticulosis and R/O colon cancer  Not actively bleeding.  Hg stable.  Hemodynamically stable    Rec:  Maintain Active Type and Screen   Closely monitor vital signs.  Trend Hb and Keep it > 8, transfuse PRBC if necessary.  Patient never had a colonoscopy before.  Will recommend Dr. Solis to see her on Tuesday and schedule for colonoscopy as inpatient if possible  c/w sitz bath, avoid constipation.  Laxative regimen: miralax TID, senna at night, colace 100mg TID and tap water enemas TID.

## 2019-08-30 NOTE — CONSULT NOTE ADULT - SUBJECTIVE AND OBJECTIVE BOX
Patient is a 68y old  Female who presents with a chief complaint of hemorrhoidal bleed, chest pain (30 Aug 2019 14:05)    HPI:  68F with PMHx of uncontrolled DM (non-compliant), viral myopathy, recent labial abscess s/p ID and recent admission for fall and hip fracture s/p fixation presents for LGIB bleed. Patient had a fall and surgical repair of hip fracture in early August, after which she occasionally used opioids for pain management had had decreased mobility. Approximately 2 weeks ago she became constipated and has been straining to have bowel movements. Not long after this became aware of  reducible, non-painful hemorrhoids. This morning she noticed bright red blood in the toilet bowl, and then on the way to the ED she states she was "soaked" in bright red. She has never had a colonoscopy.  Of note, patient also c/o sharp chest pain which lasted for a few seconds, not related to exertion, is related to anxiety and states it resolves on its own or with Xanax. Patient had recent echo which revealed moderate AS, was planning for outpatient stress test.   In ED, patient was disimpacted and had large bowel movement. On arrival, afebrile, VSS, hemoglobin greater than previous admissions. CTA AP negative for bleed, however it did show distended bladder. Upon further questioning, patient has had decreased urination and per daughter, bladder prolapse for many years. (29 Aug 2019 20:24)      PAST MEDICAL & SURGICAL HISTORY:  Female bladder prolapse  Diabetes  History of repair of hip fracture      Hospital Course: GIB, likely hemorrhoidal due to constipation: Hg stable, VSS  - GI consult for possible colonoscopy (pt request Dr. Solis, never had colonoscopy before)  - Monitor CBC, Hb stable  - Sitz bath  - Laxatives      Chest pain: Symptoms appear non-cardiac but due to risk factors patient was admitted to tele  - Consider inpatient vs outpatient stress test when bleeding resolves  - anxiety related, resolves with benzos  - C/w statin  - C/w ACEI    HTN:  - ACEI    Constipation: Patient was taking opiod pain meds and immobile following surgery; disimpacted in ED  - Senna  - Miralax  - Tap water enema PRN  - No opioids    Distended bladder with bladder prolapse: Pt initially refusing montejo, agreeing to straight cath for now; per patient's daughter has had prolapse for many years  - montejo placed, output 1L  - No hydro or RIC  - Consider uro-gyn  outpatient referral  -uro consulted    DM: Patient was non-compliant, HbA1C ~15; just recently restarted on medications  - Basal/bolus/correction  - MOnitor FS    Renal mass:  - Outpatient MRI    DVT ppx: Lovanox 40mg    TODAY'S SUBJECTIVE & REVIEW OF SYMPTOMS:     Constitutional WNL   Cardio WNL   Resp WNL   GI WNL  Heme WNL  Endo WNL  Skin WNL  MSK WNL  Neuro WNL  Cognitive WNL  Psych WNL      MEDICATIONS  (STANDING):  amLODIPine   Tablet 5 milliGRAM(s) Oral daily  atorvastatin 20 milliGRAM(s) Oral at bedtime  chlorhexidine 4% Liquid 1 Application(s) Topical <User Schedule>  enoxaparin Injectable 40 milliGRAM(s) SubCutaneous daily  losartan 50 milliGRAM(s) Oral daily  melatonin 5 milliGRAM(s) Oral at bedtime  polyethylene glycol 3350 17 Gram(s) Oral daily  saline laxative (FLEET) Rectal Enema 1 Enema Rectal Once  senna 2 Tablet(s) Oral at bedtime    MEDICATIONS  (PRN):  acetaminophen   Tablet .. 650 milliGRAM(s) Oral every 6 hours PRN Moderate Pain (4 - 6)      FAMILY HISTORY:  FH: myocardial infarction  FH: stomach cancer      Allergies    No Known Allergies    Intolerances        SOCIAL HISTORY:    [    ] Etoh  [    ] Smoking  [    ] Substance abuse     Home Environment:  [ x ] Home Alone   [facility]  [    ] Lives with Family  [    ] Home Health Aid    Dwelling:  [    ] Apartment  [   x ] Private House  [    ] Adult Home  [    ] Skilled Nursing Facility      [    ] Short Term  [    ] Long Term  [ x   ] Stairs                           [    ] Elevator     FUNCTIONAL STATUS PTA: (Check all that apply)  Ambulation: [   x  ]Independent    [    ] Dependent     [    ] Non-Ambulatory  Assistive Device: [    ] SA Cane  [    ]  Q Cane  [ x   ] Walker  [    ]  Wheelchair  ADL : [ x   ] Independent  [    ]  Dependent       Vital Signs Last 24 Hrs  T(C): 36.9 (30 Aug 2019 13:55), Max: 37.2 (30 Aug 2019 05:49)  T(F): 98.4 (30 Aug 2019 13:55), Max: 99 (30 Aug 2019 05:49)  HR: 94 (30 Aug 2019 13:55) (88 - 104)  BP: 133/64 (30 Aug 2019 13:55) (117/69 - 143/63)  BP(mean): 90 (30 Aug 2019 13:55) (90 - 90)  RR: 18 (30 Aug 2019 13:55) (18 - 20)  SpO2: 99% (29 Aug 2019 21:11) (99% - 99%)      PHYSICAL EXAM: Alert & Oriented X3  GENERAL: NAD, well-groomed, well-developed  HEAD:  Atraumatic, Normocephalic  EYES: EOMI, PERRLA, conjunctiva and sclera clear  NECK: Supple  CHEST/LUNG: Clear bilaterally  HEART: Regular rate and rhythm  ABDOMEN: Soft, Nontender, Nondistended; Bowel sounds present +montejo  EXTREMITIES:  no calf tenderness,no edema BLES L hip incision healed    NERVOUS SYSTEM:  Cranial Nerves 2-12 intact [   x ] Abnormal  [    ]  ROM: WFL all extremities [  x  ]  Abnormal [     ]  Motor Strength: WFL all extremities  [  x  ]  Abnormal [    ]Sl weak dorsiflexors RLE  Sensation: intact to light touch [   ] Abnormal [x    ]paresthesias feet  Reflexes: Symmetric [    ]  Abnormal [    ]    FUNCTIONAL STATUS:  Bed Mobility: [   ]  Independent [ x   ]  Supervision [    ]  Needs Assistance [  ]  N/A  Transfers: [    ]  Independent [ x   ]  Supervision [    ]  Needs Assistance [    ]  N/A    Ambulation:  [    ]  Independent [    ]  Supervision [    ]  Needs Assistance [  x  ]  N/A   ADL:  [    ]   Independent [    ] Requires Assistance [    x] N/A   REPORTEDLY AMBULATING IN ROOM WITH RW    LABS:                        9.6    8.31  )-----------( 337      ( 30 Aug 2019 07:16 )             30.3     08-30    140  |  102  |  21<H>  ----------------------------<  170<H>  4.5   |  24  |  0.8    Ca    10.3<H>      30 Aug 2019 07:16    TPro  6.9  /  Alb  3.9  /  TBili  0.3  /  DBili  x   /  AST  8   /  ALT  <5  /  AlkPhos  100  08-30    PT/INR - ( 29 Aug 2019 15:30 )   PT: 12.60 sec;   INR: 1.10 ratio         PTT - ( 29 Aug 2019 15:30 )  PTT:31.1 sec  Urinalysis Basic - ( 30 Aug 2019 07:42 )    Color: Yellow / Appearance: Slightly Turbid / S.023 / pH: x  Gluc: x / Ketone: Negative  / Bili: Negative / Urobili: <2 mg/dL   Blood: x / Protein: 100 mg/dL / Nitrite: Negative   Leuk Esterase: Large / RBC: 1 /HPF /  /HPF   Sq Epi: x / Non Sq Epi: 0 /HPF / Bacteria: Many        RADIOLOGY & ADDITIONAL STUDIES:

## 2019-08-30 NOTE — PROGRESS NOTE ADULT - ASSESSMENT
8F with PMHx of uncontrolled DM (non-compliant), viral myopathy, recent labial abscess s/p ID and recent admission for fall and hip fracture s/p fixation presents for LGIB bleed.     LGIB, likely hemorrhoidal due to constipation: Hg stable, VSS  - GI consult for possible colonoscopy (pt request Dr. Solis, never had colonoscopy before)  - Monitor CBC, Hb stable  - Sitz bath  - Laxatives      Chest pain: Symptoms appear non-cardiac but due to risk factors patient was admitted to tele  - Consider inpatient vs outpatient stress test when bleeding resolves  - anxiety related, resolves with benzos  - C/w statin  - C/w ACEI    HTN:  - ACEI    Constipation: Patient was taking opiod pain meds and immobile following surgery; disimpacted in ED  - Senna  - Miralax  - Tap water enema PRN  - No opioids    Distended bladder with bladder prolapse: Pt initially refusing montejo, agreeing to straight cath for now; per patient's daughter has had prolapse for many years  - montejo placed, output 1L  - No hydro or RIC  - Consider uro-gyn  outpatient referral  -uro consulted    DM: Patient was non-compliant, HbA1C ~15; just recently restarted on medications  - Basal/bolus/correction  - MOnitor FS    Renal mass:  - Outpatient MRI    DVT ppx: Lovanox 40mg  Dispo: From home

## 2019-08-30 NOTE — CONSULT NOTE ADULT - ATTENDING COMMENTS
above noted abdomen soft no distension will check ct scan discussed case with surgical resident
I saw , examined , evaluated patient ,and agree with PA findings /  plan    -Pt had normal BM today   -d/c cath -- when voiding -- stable for discharge

## 2019-08-30 NOTE — CONSULT NOTE ADULT - SUBJECTIVE AND OBJECTIVE BOX
HPI:  68F with PMHx of uncontrolled DM (non-compliant), viral myopathy, recent labial abscess s/p ID and recent admission for fall and hip fracture s/p fixation presents for LGIB bleed. Patient had a fall and surgical repair of hip fracture in early August, after which she occasionally used opioids for pain management had had decreased mobility. Approximately 2 weeks ago she became constipated and has been straining to have bowel movements. Not long after this became aware of  reducible, non-painful hemorrhoids. This morning she noticed bright red blood in the toilet bowl, and then on the way to the ED she states she was "soaked" in bright red. She has never had a colonoscopy.  Of note, patient also c/o sharp chest pain which lasted for a few seconds, not related to exertion, is related to anxiety and states it resolves on its own or with Xanax. Patient had recent echo which revealed moderate AS, was planning for outpatient stress test.   In ED, patient was disimpacted and had large bowel movement. On arrival, afebrile, VSS, hemoglobin greater than previous admissions. CTA AP negative for bleed, however it did show distended bladder. Upon further questioning, patient has had decreased urination and per daughter, bladder prolapse for many years. (29 Aug 2019 20:24)  Called by house staff for montejo placement, attempts by staff unsuccessful    PAST MEDICAL & SURGICAL HISTORY:  Female bladder prolapse  Diabetes  History of repair of hip fracture      REVIEW OF SYSTEMS:    CONSTITUTIONAL:  fevers or chills  HEENT: No visual changes  ENDO: No sweating  NECK: No pain or stiffness  MUSCULOSKELETAL: No back pain, no joint pain  RESPIRATORY: No shortness of breath  CARDIOVASCULAR: No chest pain  GASTROINTESTINAL: No abdominal or epigastric pain. No nausea, vomiting,  No diarrhea or constipation.   NEUROLOGICAL: No mental status changes  PSYCH: No depression, no mood changes  SKIN: No itching      MEDICATIONS  (STANDING):  amLODIPine   Tablet 5 milliGRAM(s) Oral daily  atorvastatin 20 milliGRAM(s) Oral at bedtime  chlorhexidine 4% Liquid 1 Application(s) Topical <User Schedule>  losartan 50 milliGRAM(s) Oral daily  melatonin 5 milliGRAM(s) Oral at bedtime  polyethylene glycol 3350 17 Gram(s) Oral daily  saline laxative (FLEET) Rectal Enema 1 Enema Rectal Once  senna 2 Tablet(s) Oral at bedtime    MEDICATIONS  (PRN):  acetaminophen   Tablet .. 650 milliGRAM(s) Oral every 6 hours PRN Moderate Pain (4 - 6)      Allergies    No Known Allergies    Intolerances        SOCIAL HISTORY: No illicit drug use    FAMILY HISTORY:  FH: myocardial infarction  FH: stomach cancer      Vital Signs Last 24 Hrs  T(C): 37.2 (30 Aug 2019 05:49), Max: 37.2 (30 Aug 2019 05:49)  T(F): 99 (30 Aug 2019 05:49), Max: 99 (30 Aug 2019 05:49)  HR: 88 (30 Aug 2019 05:49) (88 - 104)  BP: 143/63 (30 Aug 2019 05:49) (117/69 - 143/63)  BP(mean): --  RR: 20 (30 Aug 2019 05:49) (18 - 20)  SpO2: 99% (29 Aug 2019 21:11) (99% - 100%)    PHYSICAL EXAM:    Constitutional: NAD, well-developed  HEENT: EOMI  Neck: no pain  Back: No CVA tenderness  Respiratory: No accessory respiratory muscle use  Abd: Soft, NT/ND  no organomegally  no hernia  : + bladder prolapse obscuring urethral opening. Reduiced bladder and urethra visualized and under sterile conditions  placed 18 coude montejo catheter, Approx 1500ml of cloudy yellow urine drained  Extremities: no edema  Neurological: A/O x 3  Psychiatric: Normal mood, normal affect  Skin: No rashes    I&O's Summary      LABS:                        10.2   12.39 )-----------( 385      ( 29 Aug 2019 15:30 )             32.6     08-29    139  |  102  |  25<H>  ----------------------------<  140<H>  4.3   |  20  |  0.8    Ca    10.3<H>      29 Aug 2019 15:30    TPro  7.1  /  Alb  4.0  /  TBili  0.3  /  DBili  x   /  AST  10  /  ALT  <5  /  AlkPhos  110  08-29    PT/INR - ( 29 Aug 2019 15:30 )   PT: 12.60 sec;   INR: 1.10 ratio         PTT - ( 29 Aug 2019 15:30 )  PTT:31.1 sec            RADIOLOGY & ADDITIONAL STUDIES:  < from: US Urinary Bladder (08.30.19 @ 05:28) >    EXAM:  US URINARY BLADDER            PROCEDURE DATE:  08/30/2019            INTERPRETATION:  INDICATION: Urinary retention.    COMPARISON: Correlation with CT abdomen and pelvis dated 8/29/2019.    PROCEDURE: Sonographic examination of the bladder.    FINDINGS:    Prevoid volume of approximately 1740 mL. Small intraluminal debris is   present. Bilateral ureteral jets are visualized. Postvoid volume is   approximately 1155 mL.    IMPRESSION:    Markedly distended bladder with postvoid residual volume of 1155 mL,   compatible with urinary retention    Nonspecific debris within the urinary bladder. Correlate with urinalysis.    < end of copied text >

## 2019-08-30 NOTE — CONSULT NOTE ADULT - ASSESSMENT
IMPRESSION: Rehab of gait ab recent L hip fx ORIF,constipartion, bleeding hemorrhoids, urinary retention    PRECAUTIONS: [  x  ] Cardiac  [    ] Respiratory  [    ] Seizures [    ] Contact Isolation  [    ] Droplet Isolation  [    ] Other    Weight Bearing Status: WBAT    RECOMMENDATION: PT TO CLEAR FOR DC    Out of Bed to Chair     DVT/Decubiti Prophylaxis    REHAB PLAN:     [ x    ] Bedside P/T 3-5 times a week   [     ] Bedside O/T  2-3 times a week   [     ] No Rehab Therapy Indicated   [     ]  Speech Therapy   Conditioning/ROM                                 ADL  Bed Mobility                                            Conditioning/ROM  Transfers                                                  Bed Mobility  Sitting /Standing Balance                      Transfers                                        Gait Training                                            Sitting/Standing Balance  Stair Training [x   ]Applicable                 Home equipment Eval                                                                     Splinting  [   ] Only      GOALS:   ADL   [  x  ]   Independent         Transfers  [ x  ] Independent            Ambulation  [  x   ] Independent     [   x  ] With device                            [    ]  CG                                               [    ]  CG                                                    [     ] CG                            [    ] Min A                                          [    ] Min A                                                [     ] Min  A          DISCHARGE PLAN:   [     ]  Good candidate for Intensive Rehabilitation/Hospital based                                             Will tolerate 3hrs Intensive Rehab Daily                                       [      ]  Short Term Rehab in Skilled Nursing Facility                                       [    x  ]  Home with Outpatient or  services                                         [      ]  Possible Candidate for Intensive Hospital based Rehab

## 2019-08-30 NOTE — PROGRESS NOTE ADULT - ASSESSMENT
Assessment:  67yo F with hemorrhoids, bladder prolapse and constipation s/p manual disimpaction.    Plan:  -f/u GI consult  -trend Hgb  -continue to monitor bowel function

## 2019-08-31 LAB
ANION GAP SERPL CALC-SCNC: 13 MMOL/L — SIGNIFICANT CHANGE UP (ref 7–14)
BASOPHILS # BLD AUTO: 0.02 K/UL — SIGNIFICANT CHANGE UP (ref 0–0.2)
BASOPHILS NFR BLD AUTO: 0.3 % — SIGNIFICANT CHANGE UP (ref 0–1)
BUN SERPL-MCNC: 18 MG/DL — SIGNIFICANT CHANGE UP (ref 10–20)
CALCIUM SERPL-MCNC: 9.9 MG/DL — SIGNIFICANT CHANGE UP (ref 8.5–10.1)
CHLORIDE SERPL-SCNC: 102 MMOL/L — SIGNIFICANT CHANGE UP (ref 98–110)
CO2 SERPL-SCNC: 25 MMOL/L — SIGNIFICANT CHANGE UP (ref 17–32)
CREAT SERPL-MCNC: 0.7 MG/DL — SIGNIFICANT CHANGE UP (ref 0.7–1.5)
EOSINOPHIL # BLD AUTO: 0.14 K/UL — SIGNIFICANT CHANGE UP (ref 0–0.7)
EOSINOPHIL NFR BLD AUTO: 2.1 % — SIGNIFICANT CHANGE UP (ref 0–8)
GLUCOSE BLDC GLUCOMTR-MCNC: 177 MG/DL — HIGH (ref 70–99)
GLUCOSE BLDC GLUCOMTR-MCNC: 188 MG/DL — HIGH (ref 70–99)
GLUCOSE SERPL-MCNC: 186 MG/DL — HIGH (ref 70–99)
HCT VFR BLD CALC: 29.7 % — LOW (ref 37–47)
HGB BLD-MCNC: 9.4 G/DL — LOW (ref 12–16)
IMM GRANULOCYTES NFR BLD AUTO: 0.4 % — HIGH (ref 0.1–0.3)
LYMPHOCYTES # BLD AUTO: 1.29 K/UL — SIGNIFICANT CHANGE UP (ref 1.2–3.4)
LYMPHOCYTES # BLD AUTO: 18.9 % — LOW (ref 20.5–51.1)
MCHC RBC-ENTMCNC: 26.6 PG — LOW (ref 27–31)
MCHC RBC-ENTMCNC: 31.6 G/DL — LOW (ref 32–37)
MCV RBC AUTO: 84.1 FL — SIGNIFICANT CHANGE UP (ref 81–99)
MONOCYTES # BLD AUTO: 0.51 K/UL — SIGNIFICANT CHANGE UP (ref 0.1–0.6)
MONOCYTES NFR BLD AUTO: 7.5 % — SIGNIFICANT CHANGE UP (ref 1.7–9.3)
NEUTROPHILS # BLD AUTO: 4.82 K/UL — SIGNIFICANT CHANGE UP (ref 1.4–6.5)
NEUTROPHILS NFR BLD AUTO: 70.8 % — SIGNIFICANT CHANGE UP (ref 42.2–75.2)
NRBC # BLD: 0 /100 WBCS — SIGNIFICANT CHANGE UP (ref 0–0)
PLATELET # BLD AUTO: 314 K/UL — SIGNIFICANT CHANGE UP (ref 130–400)
POTASSIUM SERPL-MCNC: 4.8 MMOL/L — SIGNIFICANT CHANGE UP (ref 3.5–5)
POTASSIUM SERPL-SCNC: 4.8 MMOL/L — SIGNIFICANT CHANGE UP (ref 3.5–5)
RBC # BLD: 3.53 M/UL — LOW (ref 4.2–5.4)
RBC # FLD: 14 % — SIGNIFICANT CHANGE UP (ref 11.5–14.5)
SODIUM SERPL-SCNC: 140 MMOL/L — SIGNIFICANT CHANGE UP (ref 135–146)
WBC # BLD: 6.81 K/UL — SIGNIFICANT CHANGE UP (ref 4.8–10.8)
WBC # FLD AUTO: 6.81 K/UL — SIGNIFICANT CHANGE UP (ref 4.8–10.8)

## 2019-08-31 PROCEDURE — 99233 SBSQ HOSP IP/OBS HIGH 50: CPT

## 2019-08-31 PROCEDURE — 99222 1ST HOSP IP/OBS MODERATE 55: CPT

## 2019-08-31 RX ORDER — HYDROCORTISONE 1 %
1 OINTMENT (GRAM) TOPICAL AT BEDTIME
Refills: 0 | Status: DISCONTINUED | OUTPATIENT
Start: 2019-08-31 | End: 2019-09-04

## 2019-08-31 RX ORDER — POLYETHYLENE GLYCOL 3350 17 G/17G
17 POWDER, FOR SOLUTION ORAL
Refills: 0 | Status: DISCONTINUED | OUTPATIENT
Start: 2019-08-31 | End: 2019-09-04

## 2019-08-31 RX ADMIN — ENOXAPARIN SODIUM 40 MILLIGRAM(S): 100 INJECTION SUBCUTANEOUS at 11:14

## 2019-08-31 RX ADMIN — POLYETHYLENE GLYCOL 3350 17 GRAM(S): 17 POWDER, FOR SOLUTION ORAL at 11:14

## 2019-08-31 RX ADMIN — CHLORHEXIDINE GLUCONATE 1 APPLICATION(S): 213 SOLUTION TOPICAL at 04:57

## 2019-08-31 RX ADMIN — AMLODIPINE BESYLATE 5 MILLIGRAM(S): 2.5 TABLET ORAL at 05:00

## 2019-08-31 RX ADMIN — ATORVASTATIN CALCIUM 20 MILLIGRAM(S): 80 TABLET, FILM COATED ORAL at 21:08

## 2019-08-31 RX ADMIN — LOSARTAN POTASSIUM 50 MILLIGRAM(S): 100 TABLET, FILM COATED ORAL at 05:00

## 2019-08-31 RX ADMIN — Medication 650 MILLIGRAM(S): at 16:36

## 2019-08-31 RX ADMIN — SENNA PLUS 2 TABLET(S): 8.6 TABLET ORAL at 21:08

## 2019-08-31 RX ADMIN — Medication 5 MILLIGRAM(S): at 21:08

## 2019-08-31 NOTE — PHYSICAL THERAPY INITIAL EVALUATION ADULT - GENERAL OBSERVATIONS, REHAB EVAL
17:10-17:30 chart reviewed. Pt received semi-deras at B/S, alert, oriented, able to follow multi-step instructions and agreeable to PT evaluation. + IV, denies pain or discomfort, stable vitals, noted with R drop foot during ambulation. NAD

## 2019-08-31 NOTE — PHYSICAL THERAPY INITIAL EVALUATION ADULT - PERTINENT HX OF CURRENT PROBLEM, REHAB EVAL
68F with PMHx of uncontrolled DM (non-compliant), viral myopathy, recent labial abscess s/p ID and recent admission for fall and hip fracture s/p fixation presents for LGIB bleed. Patient had a fall and surgical repair of hip fracture in early August, Approximately 2 weeks ago she became constipated and has been straining to have bowel movements. Not long after this became aware of  reducible, non-painful hemorrhoids.

## 2019-08-31 NOTE — PROGRESS NOTE ADULT - ASSESSMENT
68F with PMHx of uncontrolled DM (non-compliant), viral myopathy, recent labial abscess s/p ID and recent admission for fall and hip fracture s/p fixation presents for BRBPR due to severe constipation.     BRBPR due to severe constipation  - s/p disimpaction in the ED with significant improvement in symptoms  - Still with hard BM  - senna, colace, Miralax and enemas  - GI consulted, patient never had a colonoscopy but would like to do it as OP with Dr Branch  - Surgery consulted    Urinary retention, acute  suspect multifactorial, due to mass effect of constipation versus bladder/uterus prolapse  - montejo placed with >1L output, removed overnight but still retaining approx 600cc  - No hydro or RIC  - Encouraged pt to ambulate, c/w monitoring  - UA positive, but patient asymptomatic - awaiting urine cx, will hold off on abx at this time    Chest pain: suspect anxiety related - resolved  - CE negative x2, EKG unchanged  - C/w statin and acei    DM: Patient was non-compliant, HbA1C ~15; just recently restarted on medications  - Basal/bolus/correction insulin  - MOnitor FS    Renal mass:  - Outpatient MRI    #Progress Note Handoff  Pending (specify): constipation improvement and urinary retention  Family discussion: Plan of care discussed with patient, aware and agreeable   Disposition:  home when medically ready

## 2019-08-31 NOTE — PROGRESS NOTE ADULT - ASSESSMENT
Assessment:  69yo F with hemorrhoids, bladder prolapse and constipation s/p manual disimpaction.    Plan:  - GI reccs appreciated  -trend Hgb 9.0 >8.0  -continue to monitor bowel function

## 2019-09-01 LAB
-  AMIKACIN: SIGNIFICANT CHANGE UP
-  AMPICILLIN/SULBACTAM: SIGNIFICANT CHANGE UP
-  AMPICILLIN: SIGNIFICANT CHANGE UP
-  AZTREONAM: SIGNIFICANT CHANGE UP
-  CEFAZOLIN: SIGNIFICANT CHANGE UP
-  CEFEPIME: SIGNIFICANT CHANGE UP
-  CEFOXITIN: SIGNIFICANT CHANGE UP
-  CEFTRIAXONE: SIGNIFICANT CHANGE UP
-  CIPROFLOXACIN: SIGNIFICANT CHANGE UP
-  ERTAPENEM: SIGNIFICANT CHANGE UP
-  GENTAMICIN: SIGNIFICANT CHANGE UP
-  IMIPENEM: SIGNIFICANT CHANGE UP
-  LEVOFLOXACIN: SIGNIFICANT CHANGE UP
-  MEROPENEM: SIGNIFICANT CHANGE UP
-  NITROFURANTOIN: SIGNIFICANT CHANGE UP
-  PIPERACILLIN/TAZOBACTAM: SIGNIFICANT CHANGE UP
-  TIGECYCLINE: SIGNIFICANT CHANGE UP
-  TOBRAMYCIN: SIGNIFICANT CHANGE UP
-  TRIMETHOPRIM/SULFAMETHOXAZOLE: SIGNIFICANT CHANGE UP
ALBUMIN SERPL ELPH-MCNC: 4.1 G/DL — SIGNIFICANT CHANGE UP (ref 3.5–5.2)
ALP SERPL-CCNC: 107 U/L — SIGNIFICANT CHANGE UP (ref 30–115)
ALT FLD-CCNC: <5 U/L — SIGNIFICANT CHANGE UP (ref 0–41)
ANION GAP SERPL CALC-SCNC: 14 MMOL/L — SIGNIFICANT CHANGE UP (ref 7–14)
APPEARANCE UR: ABNORMAL
AST SERPL-CCNC: 9 U/L — SIGNIFICANT CHANGE UP (ref 0–41)
BACTERIA # UR AUTO: ABNORMAL
BASOPHILS # BLD AUTO: 0.02 K/UL — SIGNIFICANT CHANGE UP (ref 0–0.2)
BASOPHILS NFR BLD AUTO: 0.4 % — SIGNIFICANT CHANGE UP (ref 0–1)
BILIRUB SERPL-MCNC: 0.3 MG/DL — SIGNIFICANT CHANGE UP (ref 0.2–1.2)
BILIRUB UR-MCNC: NEGATIVE — SIGNIFICANT CHANGE UP
BUN SERPL-MCNC: 20 MG/DL — SIGNIFICANT CHANGE UP (ref 10–20)
CALCIUM SERPL-MCNC: 10.4 MG/DL — HIGH (ref 8.5–10.1)
CHLORIDE SERPL-SCNC: 101 MMOL/L — SIGNIFICANT CHANGE UP (ref 98–110)
CO2 SERPL-SCNC: 28 MMOL/L — SIGNIFICANT CHANGE UP (ref 17–32)
COLOR SPEC: SIGNIFICANT CHANGE UP
CREAT SERPL-MCNC: 0.8 MG/DL — SIGNIFICANT CHANGE UP (ref 0.7–1.5)
CULTURE RESULTS: SIGNIFICANT CHANGE UP
DIFF PNL FLD: ABNORMAL
EOSINOPHIL # BLD AUTO: 0.14 K/UL — SIGNIFICANT CHANGE UP (ref 0–0.7)
EOSINOPHIL NFR BLD AUTO: 2.5 % — SIGNIFICANT CHANGE UP (ref 0–8)
EPI CELLS # UR: 0 /HPF — SIGNIFICANT CHANGE UP (ref 0–5)
GLUCOSE BLDC GLUCOMTR-MCNC: 148 MG/DL — HIGH (ref 70–99)
GLUCOSE BLDC GLUCOMTR-MCNC: 171 MG/DL — HIGH (ref 70–99)
GLUCOSE BLDC GLUCOMTR-MCNC: 249 MG/DL — HIGH (ref 70–99)
GLUCOSE BLDC GLUCOMTR-MCNC: 283 MG/DL — HIGH (ref 70–99)
GLUCOSE SERPL-MCNC: 152 MG/DL — HIGH (ref 70–99)
GLUCOSE UR QL: NEGATIVE — SIGNIFICANT CHANGE UP
HCT VFR BLD CALC: 30.5 % — LOW (ref 37–47)
HGB BLD-MCNC: 9.4 G/DL — LOW (ref 12–16)
HYALINE CASTS # UR AUTO: 0 /LPF — SIGNIFICANT CHANGE UP (ref 0–7)
IMM GRANULOCYTES NFR BLD AUTO: 0.4 % — HIGH (ref 0.1–0.3)
KETONES UR-MCNC: NEGATIVE — SIGNIFICANT CHANGE UP
LEUKOCYTE ESTERASE UR-ACNC: ABNORMAL
LYMPHOCYTES # BLD AUTO: 1.49 K/UL — SIGNIFICANT CHANGE UP (ref 1.2–3.4)
LYMPHOCYTES # BLD AUTO: 26.6 % — SIGNIFICANT CHANGE UP (ref 20.5–51.1)
MAGNESIUM SERPL-MCNC: 2.3 MG/DL — SIGNIFICANT CHANGE UP (ref 1.8–2.4)
MCHC RBC-ENTMCNC: 26 PG — LOW (ref 27–31)
MCHC RBC-ENTMCNC: 30.8 G/DL — LOW (ref 32–37)
MCV RBC AUTO: 84.3 FL — SIGNIFICANT CHANGE UP (ref 81–99)
METHOD TYPE: SIGNIFICANT CHANGE UP
MONOCYTES # BLD AUTO: 0.45 K/UL — SIGNIFICANT CHANGE UP (ref 0.1–0.6)
MONOCYTES NFR BLD AUTO: 8 % — SIGNIFICANT CHANGE UP (ref 1.7–9.3)
NEUTROPHILS # BLD AUTO: 3.48 K/UL — SIGNIFICANT CHANGE UP (ref 1.4–6.5)
NEUTROPHILS NFR BLD AUTO: 62.1 % — SIGNIFICANT CHANGE UP (ref 42.2–75.2)
NITRITE UR-MCNC: NEGATIVE — SIGNIFICANT CHANGE UP
NRBC # BLD: 0 /100 WBCS — SIGNIFICANT CHANGE UP (ref 0–0)
ORGANISM # SPEC MICROSCOPIC CNT: SIGNIFICANT CHANGE UP
ORGANISM # SPEC MICROSCOPIC CNT: SIGNIFICANT CHANGE UP
PH UR: 6.5 — SIGNIFICANT CHANGE UP (ref 5–8)
PLATELET # BLD AUTO: 379 K/UL — SIGNIFICANT CHANGE UP (ref 130–400)
POTASSIUM SERPL-MCNC: 4.7 MMOL/L — SIGNIFICANT CHANGE UP (ref 3.5–5)
POTASSIUM SERPL-SCNC: 4.7 MMOL/L — SIGNIFICANT CHANGE UP (ref 3.5–5)
PROT SERPL-MCNC: 7.4 G/DL — SIGNIFICANT CHANGE UP (ref 6–8)
PROT UR-MCNC: ABNORMAL
RBC # BLD: 3.62 M/UL — LOW (ref 4.2–5.4)
RBC # FLD: 14 % — SIGNIFICANT CHANGE UP (ref 11.5–14.5)
RBC CASTS # UR COMP ASSIST: 1 /HPF — SIGNIFICANT CHANGE UP (ref 0–4)
SODIUM SERPL-SCNC: 143 MMOL/L — SIGNIFICANT CHANGE UP (ref 135–146)
SP GR SPEC: 1.01 — LOW (ref 1.01–1.02)
SPECIMEN SOURCE: SIGNIFICANT CHANGE UP
UROBILINOGEN FLD QL: SIGNIFICANT CHANGE UP
WBC # BLD: 5.6 K/UL — SIGNIFICANT CHANGE UP (ref 4.8–10.8)
WBC # FLD AUTO: 5.6 K/UL — SIGNIFICANT CHANGE UP (ref 4.8–10.8)
WBC UR QL: 233 /HPF — HIGH (ref 0–5)

## 2019-09-01 PROCEDURE — 99222 1ST HOSP IP/OBS MODERATE 55: CPT

## 2019-09-01 PROCEDURE — 99232 SBSQ HOSP IP/OBS MODERATE 35: CPT

## 2019-09-01 RX ORDER — MULTIVIT WITH MIN/MFOLATE/K2 340-15/3 G
1 POWDER (GRAM) ORAL ONCE
Refills: 0 | Status: DISCONTINUED | OUTPATIENT
Start: 2019-09-01 | End: 2019-09-01

## 2019-09-01 RX ORDER — MULTIVIT WITH MIN/MFOLATE/K2 340-15/3 G
1 POWDER (GRAM) ORAL ONCE
Refills: 0 | Status: COMPLETED | OUTPATIENT
Start: 2019-09-01 | End: 2019-09-01

## 2019-09-01 RX ORDER — GLUCAGON INJECTION, SOLUTION 0.5 MG/.1ML
1 INJECTION, SOLUTION SUBCUTANEOUS ONCE
Refills: 0 | Status: DISCONTINUED | OUTPATIENT
Start: 2019-09-01 | End: 2019-09-04

## 2019-09-01 RX ORDER — DEXTROSE 50 % IN WATER 50 %
12.5 SYRINGE (ML) INTRAVENOUS ONCE
Refills: 0 | Status: DISCONTINUED | OUTPATIENT
Start: 2019-09-01 | End: 2019-09-04

## 2019-09-01 RX ORDER — INSULIN LISPRO 100/ML
VIAL (ML) SUBCUTANEOUS
Refills: 0 | Status: DISCONTINUED | OUTPATIENT
Start: 2019-09-01 | End: 2019-09-04

## 2019-09-01 RX ORDER — ALPRAZOLAM 0.25 MG
0.5 TABLET ORAL EVERY 6 HOURS
Refills: 0 | Status: DISCONTINUED | OUTPATIENT
Start: 2019-09-01 | End: 2019-09-04

## 2019-09-01 RX ORDER — DEXTROSE 50 % IN WATER 50 %
25 SYRINGE (ML) INTRAVENOUS ONCE
Refills: 0 | Status: DISCONTINUED | OUTPATIENT
Start: 2019-09-01 | End: 2019-09-04

## 2019-09-01 RX ORDER — SODIUM CHLORIDE 9 MG/ML
1000 INJECTION, SOLUTION INTRAVENOUS
Refills: 0 | Status: DISCONTINUED | OUTPATIENT
Start: 2019-09-01 | End: 2019-09-04

## 2019-09-01 RX ORDER — DEXTROSE 50 % IN WATER 50 %
15 SYRINGE (ML) INTRAVENOUS ONCE
Refills: 0 | Status: DISCONTINUED | OUTPATIENT
Start: 2019-09-01 | End: 2019-09-04

## 2019-09-01 RX ADMIN — POLYETHYLENE GLYCOL 3350 17 GRAM(S): 17 POWDER, FOR SOLUTION ORAL at 05:15

## 2019-09-01 RX ADMIN — Medication 2: at 11:36

## 2019-09-01 RX ADMIN — Medication 1 BOTTLE: at 15:23

## 2019-09-01 RX ADMIN — SENNA PLUS 2 TABLET(S): 8.6 TABLET ORAL at 21:12

## 2019-09-01 RX ADMIN — ENOXAPARIN SODIUM 40 MILLIGRAM(S): 100 INJECTION SUBCUTANEOUS at 11:41

## 2019-09-01 RX ADMIN — LOSARTAN POTASSIUM 50 MILLIGRAM(S): 100 TABLET, FILM COATED ORAL at 05:15

## 2019-09-01 RX ADMIN — AMLODIPINE BESYLATE 5 MILLIGRAM(S): 2.5 TABLET ORAL at 05:15

## 2019-09-01 RX ADMIN — ATORVASTATIN CALCIUM 20 MILLIGRAM(S): 80 TABLET, FILM COATED ORAL at 21:12

## 2019-09-01 RX ADMIN — Medication 650 MILLIGRAM(S): at 20:07

## 2019-09-01 RX ADMIN — POLYETHYLENE GLYCOL 3350 17 GRAM(S): 17 POWDER, FOR SOLUTION ORAL at 17:13

## 2019-09-01 RX ADMIN — Medication 5 MILLIGRAM(S): at 21:12

## 2019-09-01 NOTE — CONSULT NOTE ADULT - ASSESSMENT
67 yo P3 postomenopausal, h/o DM, s/p hip fx and repair, admitted for constipation and urinary retention, s/p fecal disimpaction, w/ likely stage III uterine prolapse, failed TOV and montejo catheter reinserted, currently clinically and hemodynamically stable.    - follow up with urogynecology outpatient for prolapse management  - recommend maintaining montejo catheter, management per urology  - f/up Ucx    Discussed with Dr. Mccloud and Dr. Garrido 69 yo P3 postomenopausal, h/o DM, s/p hip fx and repair, admitted for constipation and urinary retention without hydronephrosis, s/p fecal disimpaction, w/ likely stage III uterine prolapse, failed TOV and montejo catheter reinserted, currently clinically and hemodynamically stable.  - No hydronephrosis at this time given acute urinary retention. Pt has had prolapse for over 10 years with no issues voiding. Acute urinary retention unlikely related to prolapse given no recent change, however it is unknown at this time if patient empties well on a regular basis. As patient has not been evaluated for prolapse previously and desires management, recommend follow up as outpatient with Urogynecology.  -Pt has acutely been on narcotics s/p hip fracture now with constipation, now s/p disimpaction however still retaining. As bladder was very distended, pt may need further bladder rest with montejo, recommend re-consult with urology   - f/up Ucx, prelim gram neg rods - recommend treatment of UTI as can exacerbate symptoms  -Recommend maintaining montejo catheter, management as per urology    Discussed with Dr. Mccloud and Dr. Garrido

## 2019-09-01 NOTE — PROGRESS NOTE ADULT - ASSESSMENT
Assessment:  69yo F with hemorrhoids, bladder prolapse and constipation s/p manual disimpaction    Plan:  -f/u GI for colonoscopy next week  -reconsult urology for long term montejo management  -continue bowel reg

## 2019-09-01 NOTE — PROGRESS NOTE ADULT - ASSESSMENT
68F with PMHx of uncontrolled DM (non-compliant), viral myopathy, recent labial abscess s/p ID and recent admission for fall and hip fracture s/p fixation presents for BRBPR due to severe constipation.     BRBPR due to severe constipation/opiod use  - s/p disimpaction in the ED with significant improvement in symptoms  - Still with hard BM - c/w senna, colace, Miralax and enemas  - GI consulted, possible colonoscopy with Dr Solis on Tuesday    Urinary retention, acute  suspect multifactorial, due to mass effect of constipation versus bladder/uterus prolapse + UA  - montejo placed with >1L output, failed TOV, s/p montejo replacement with another >1L out  - No hydro or RIC  - Encouraged pt to ambulate, c/w monitoring  - UA positive, Cx with <100K gm negative rods - patient asymptomatic  - will start rocephin, pending sensitivities  - c/w montejo for now    Chest pain: suspect anxiety related - resolved  - CE negative x2, EKG unchanged  - C/w statin and acei    DM: Patient was non-compliant, HbA1C ~15; just recently restarted on medications  - Basal/bolus/correction insulin  - Monitor FS    Recent Hip replacement:  - patient ambulating around unit    #Progress Note Handoff  Pending (specify): constipation improvement and urinary retention  Family discussion: Plan of care discussed with patient, aware and agreeable   Disposition:  home when medically ready

## 2019-09-01 NOTE — PROGRESS NOTE ADULT - ASSESSMENT
68F with PMHx of uncontrolled DM (non-compliant), viral myopathy, recent labial abscess s/p ID and recent admission for fall and hip fracture s/p fixation presents for LGIB bleed.     LGIB, likely hemorrhoidal due to constipation: Hg stable, VSS  - GI for possible colonoscopy on tuesday  (Dr. Solis)  - Monitor CBC, Hb stable  - Sitz bath  - Laxatives      Chest pain: Symptoms appear non-cardiac but due to risk factors patient was admitted to tele  - Consider inpatient vs outpatient stress test when bleeding resolves  - anxiety related, resolves with benzos  - C/w statin  - C/w ACEI    HTN:  - ACEI    Constipation: Patient was taking opiod pain meds and immobile following surgery; disimpacted in ED  - Senna  - Miralax  - mag citrate  - No opioids    Distended bladder with bladder prolapse: Pt initially refusing montejo, agreeing to straight cath for now; per patient's daughter has had prolapse for many years  - montejo placed, output 1L  - No hydro or RIC  - uro-gyn consulted  - uro consulted    DM: Patient was non-compliant, HbA1C ~15; just recently restarted on medications  - on sliding scale  - MOnitor FS    Renal mass:  - Outpatient MRI    DVT ppx: Lovanox 40mg  Dispo: From home

## 2019-09-02 LAB
ALBUMIN SERPL ELPH-MCNC: 4.1 G/DL — SIGNIFICANT CHANGE UP (ref 3.5–5.2)
ALP SERPL-CCNC: 108 U/L — SIGNIFICANT CHANGE UP (ref 30–115)
ALT FLD-CCNC: <5 U/L — SIGNIFICANT CHANGE UP (ref 0–41)
ANION GAP SERPL CALC-SCNC: 13 MMOL/L — SIGNIFICANT CHANGE UP (ref 7–14)
AST SERPL-CCNC: 7 U/L — SIGNIFICANT CHANGE UP (ref 0–41)
BASOPHILS # BLD AUTO: 0.03 K/UL — SIGNIFICANT CHANGE UP (ref 0–0.2)
BASOPHILS NFR BLD AUTO: 0.3 % — SIGNIFICANT CHANGE UP (ref 0–1)
BILIRUB SERPL-MCNC: <0.2 MG/DL — SIGNIFICANT CHANGE UP (ref 0.2–1.2)
BUN SERPL-MCNC: 22 MG/DL — HIGH (ref 10–20)
CALCIUM SERPL-MCNC: 10.2 MG/DL — HIGH (ref 8.5–10.1)
CHLORIDE SERPL-SCNC: 99 MMOL/L — SIGNIFICANT CHANGE UP (ref 98–110)
CO2 SERPL-SCNC: 27 MMOL/L — SIGNIFICANT CHANGE UP (ref 17–32)
CREAT SERPL-MCNC: 0.9 MG/DL — SIGNIFICANT CHANGE UP (ref 0.7–1.5)
EOSINOPHIL # BLD AUTO: 0.26 K/UL — SIGNIFICANT CHANGE UP (ref 0–0.7)
EOSINOPHIL NFR BLD AUTO: 2.8 % — SIGNIFICANT CHANGE UP (ref 0–8)
GLUCOSE BLDC GLUCOMTR-MCNC: 167 MG/DL — HIGH (ref 70–99)
GLUCOSE BLDC GLUCOMTR-MCNC: 203 MG/DL — HIGH (ref 70–99)
GLUCOSE BLDC GLUCOMTR-MCNC: 222 MG/DL — HIGH (ref 70–99)
GLUCOSE BLDC GLUCOMTR-MCNC: 226 MG/DL — HIGH (ref 70–99)
GLUCOSE SERPL-MCNC: 215 MG/DL — HIGH (ref 70–99)
HCT VFR BLD CALC: 29.3 % — LOW (ref 37–47)
HGB BLD-MCNC: 9.3 G/DL — LOW (ref 12–16)
IMM GRANULOCYTES NFR BLD AUTO: 0.3 % — SIGNIFICANT CHANGE UP (ref 0.1–0.3)
LYMPHOCYTES # BLD AUTO: 2.07 K/UL — SIGNIFICANT CHANGE UP (ref 1.2–3.4)
LYMPHOCYTES # BLD AUTO: 22.5 % — SIGNIFICANT CHANGE UP (ref 20.5–51.1)
MAGNESIUM SERPL-MCNC: 2.4 MG/DL — SIGNIFICANT CHANGE UP (ref 1.8–2.4)
MCHC RBC-ENTMCNC: 26.7 PG — LOW (ref 27–31)
MCHC RBC-ENTMCNC: 31.7 G/DL — LOW (ref 32–37)
MCV RBC AUTO: 84.2 FL — SIGNIFICANT CHANGE UP (ref 81–99)
MONOCYTES # BLD AUTO: 0.78 K/UL — HIGH (ref 0.1–0.6)
MONOCYTES NFR BLD AUTO: 8.5 % — SIGNIFICANT CHANGE UP (ref 1.7–9.3)
NEUTROPHILS # BLD AUTO: 6.04 K/UL — SIGNIFICANT CHANGE UP (ref 1.4–6.5)
NEUTROPHILS NFR BLD AUTO: 65.6 % — SIGNIFICANT CHANGE UP (ref 42.2–75.2)
NRBC # BLD: 0 /100 WBCS — SIGNIFICANT CHANGE UP (ref 0–0)
PLATELET # BLD AUTO: 380 K/UL — SIGNIFICANT CHANGE UP (ref 130–400)
POTASSIUM SERPL-MCNC: 5 MMOL/L — SIGNIFICANT CHANGE UP (ref 3.5–5)
POTASSIUM SERPL-SCNC: 5 MMOL/L — SIGNIFICANT CHANGE UP (ref 3.5–5)
PROT SERPL-MCNC: 7.1 G/DL — SIGNIFICANT CHANGE UP (ref 6–8)
RBC # BLD: 3.48 M/UL — LOW (ref 4.2–5.4)
RBC # FLD: 13.9 % — SIGNIFICANT CHANGE UP (ref 11.5–14.5)
SODIUM SERPL-SCNC: 139 MMOL/L — SIGNIFICANT CHANGE UP (ref 135–146)
WBC # BLD: 9.21 K/UL — SIGNIFICANT CHANGE UP (ref 4.8–10.8)
WBC # FLD AUTO: 9.21 K/UL — SIGNIFICANT CHANGE UP (ref 4.8–10.8)

## 2019-09-02 PROCEDURE — 99232 SBSQ HOSP IP/OBS MODERATE 35: CPT

## 2019-09-02 RX ORDER — MULTIVIT WITH MIN/MFOLATE/K2 340-15/3 G
1 POWDER (GRAM) ORAL ONCE
Refills: 0 | Status: COMPLETED | OUTPATIENT
Start: 2019-09-02 | End: 2019-09-02

## 2019-09-02 RX ORDER — CIPROFLOXACIN LACTATE 400MG/40ML
500 VIAL (ML) INTRAVENOUS EVERY 12 HOURS
Refills: 0 | Status: DISCONTINUED | OUTPATIENT
Start: 2019-09-02 | End: 2019-09-04

## 2019-09-02 RX ORDER — CEFTRIAXONE 500 MG/1
1000 INJECTION, POWDER, FOR SOLUTION INTRAMUSCULAR; INTRAVENOUS EVERY 24 HOURS
Refills: 0 | Status: DISCONTINUED | OUTPATIENT
Start: 2019-09-02 | End: 2019-09-02

## 2019-09-02 RX ADMIN — Medication 1: at 17:18

## 2019-09-02 RX ADMIN — CEFTRIAXONE 100 MILLIGRAM(S): 500 INJECTION, POWDER, FOR SOLUTION INTRAMUSCULAR; INTRAVENOUS at 05:37

## 2019-09-02 RX ADMIN — POLYETHYLENE GLYCOL 3350 17 GRAM(S): 17 POWDER, FOR SOLUTION ORAL at 05:38

## 2019-09-02 RX ADMIN — ENOXAPARIN SODIUM 40 MILLIGRAM(S): 100 INJECTION SUBCUTANEOUS at 11:43

## 2019-09-02 RX ADMIN — SENNA PLUS 2 TABLET(S): 8.6 TABLET ORAL at 21:35

## 2019-09-02 RX ADMIN — Medication 500 MILLIGRAM(S): at 18:16

## 2019-09-02 RX ADMIN — CHLORHEXIDINE GLUCONATE 1 APPLICATION(S): 213 SOLUTION TOPICAL at 05:37

## 2019-09-02 RX ADMIN — Medication 2: at 11:43

## 2019-09-02 RX ADMIN — Medication 2: at 07:47

## 2019-09-02 RX ADMIN — Medication 650 MILLIGRAM(S): at 10:38

## 2019-09-02 RX ADMIN — ATORVASTATIN CALCIUM 20 MILLIGRAM(S): 80 TABLET, FILM COATED ORAL at 21:35

## 2019-09-02 RX ADMIN — Medication 650 MILLIGRAM(S): at 18:16

## 2019-09-02 RX ADMIN — AMLODIPINE BESYLATE 5 MILLIGRAM(S): 2.5 TABLET ORAL at 05:36

## 2019-09-02 RX ADMIN — Medication 1 BOTTLE: at 15:16

## 2019-09-02 RX ADMIN — Medication 5 MILLIGRAM(S): at 21:35

## 2019-09-02 RX ADMIN — LOSARTAN POTASSIUM 50 MILLIGRAM(S): 100 TABLET, FILM COATED ORAL at 05:36

## 2019-09-02 NOTE — PROGRESS NOTE ADULT - ASSESSMENT
68F with PMHx of uncontrolled DM (non-compliant), viral myopathy, recent labial abscess s/p ID and recent admission for fall and hip fracture s/p fixation presents for LGIB bleed.     LGIB, likely hemorrhoidal due to constipation: Hg stable, VSS  - GI for possible colonoscopy on tuesday  (Dr. Solis)  - Monitor CBC, Hb stable  - Sitz bath  - Laxatives      Chest pain: Symptoms appear non-cardiac but due to risk factors patient was admitted to tele  - Consider inpatient vs outpatient stress test when bleeding resolves  - anxiety related, resolves with benzos  - C/w statin  - C/w ACEI    HTN:  - ACEI    Constipation: Patient was taking opiod pain meds and immobile following surgery; disimpacted in ED, resolved  - Senna  - Miralax  - mag citrate  - No opioids    Distended bladder with bladder prolapse: Pt initially refusing montejo, agreeing to straight cath for now; per patient's daughter has had prolapse for many years  - montejo placed, output 1L  - No hydro or RIC  - uro-gyn consulted, to f/u opt  - uro consulted    DM: Patient was non-compliant, HbA1C ~15; just recently restarted on medications  - on sliding scale  - MOnitor FS    Renal mass:  - Outpatient MRI    DVT ppx: Lovanox 40mg  Dispo: From home

## 2019-09-02 NOTE — PROGRESS NOTE ADULT - ASSESSMENT
Assessment:  67yo F with hemorrhoids, bladder prolapse and constipation s/p manual disimpaction    Plan:  -f/u GI for colonoscopy next week  -Urology recs for long term montejo management  -continue bowel reg

## 2019-09-02 NOTE — PROGRESS NOTE ADULT - ASSESSMENT
68F with PMHx of uncontrolled DM (non-compliant), viral myopathy, recent labial abscess s/p ID and recent admission for fall and hip fracture s/p fixation presents for BRBPR due to severe constipation.     BRBPR due to severe constipation/opiod use  - s/p disimpaction in the ED with significant improvement in symptoms  - Still with hard BM - c/w senna, colace, Miralax and enemas  - GI consulted, possible colonoscopy with Dr Solis. F/u GI re: timing    Urinary retention, acute  suspect multifactorial, due to mass effect of constipation versus bladder/uterus prolapse + UA  - montejo placed with >1L output, failed TOV, s/p montejo replacement with another >1L out  - No hydro or RIC  - Encouraged pt to ambulate, c/w monitoring  - URine cx: with citrobacter Koseri, can switch to Ciprofloxacin 500 po Q12H x6 more days  - c/w montejo for now  - outpatient uroGYN f/u    Chest pain: suspect anxiety related - resolved  - CE negative x2, EKG unchanged  - C/w statin and acei    DM: Patient was non-compliant, HbA1C ~15  - Lantus 10/Lispro 3/3/3 with corrective scale and titrate up as needed    Recent Hip replacement:  - patient ambulating around unit    #Progress Note Handoff  Pending (specify): constipation improvement and urinary retention, GI f/u   Family discussion: Plan of care discussed with patient, aware and agreeable   Disposition:  home when medically ready

## 2019-09-03 LAB
-  AMIKACIN: SIGNIFICANT CHANGE UP
-  AMPICILLIN/SULBACTAM: SIGNIFICANT CHANGE UP
-  AMPICILLIN: SIGNIFICANT CHANGE UP
-  AMPICILLIN: SIGNIFICANT CHANGE UP
-  AZTREONAM: SIGNIFICANT CHANGE UP
-  CEFAZOLIN: SIGNIFICANT CHANGE UP
-  CEFEPIME: SIGNIFICANT CHANGE UP
-  CEFOXITIN: SIGNIFICANT CHANGE UP
-  CEFTRIAXONE: SIGNIFICANT CHANGE UP
-  CIPROFLOXACIN: SIGNIFICANT CHANGE UP
-  CIPROFLOXACIN: SIGNIFICANT CHANGE UP
-  GENTAMICIN: SIGNIFICANT CHANGE UP
-  IMIPENEM: SIGNIFICANT CHANGE UP
-  LEVOFLOXACIN: SIGNIFICANT CHANGE UP
-  LEVOFLOXACIN: SIGNIFICANT CHANGE UP
-  MEROPENEM: SIGNIFICANT CHANGE UP
-  NITROFURANTOIN: SIGNIFICANT CHANGE UP
-  NITROFURANTOIN: SIGNIFICANT CHANGE UP
-  PIPERACILLIN/TAZOBACTAM: SIGNIFICANT CHANGE UP
-  TETRACYCLINE: SIGNIFICANT CHANGE UP
-  TIGECYCLINE: SIGNIFICANT CHANGE UP
-  TOBRAMYCIN: SIGNIFICANT CHANGE UP
-  TRIMETHOPRIM/SULFAMETHOXAZOLE: SIGNIFICANT CHANGE UP
-  VANCOMYCIN: SIGNIFICANT CHANGE UP
ALBUMIN SERPL ELPH-MCNC: 3.7 G/DL — SIGNIFICANT CHANGE UP (ref 3.5–5.2)
ALP SERPL-CCNC: 102 U/L — SIGNIFICANT CHANGE UP (ref 30–115)
ALT FLD-CCNC: <5 U/L — SIGNIFICANT CHANGE UP (ref 0–41)
ANION GAP SERPL CALC-SCNC: 11 MMOL/L — SIGNIFICANT CHANGE UP (ref 7–14)
AST SERPL-CCNC: 10 U/L — SIGNIFICANT CHANGE UP (ref 0–41)
BASOPHILS # BLD AUTO: 0.02 K/UL — SIGNIFICANT CHANGE UP (ref 0–0.2)
BASOPHILS NFR BLD AUTO: 0.3 % — SIGNIFICANT CHANGE UP (ref 0–1)
BILIRUB SERPL-MCNC: <0.2 MG/DL — SIGNIFICANT CHANGE UP (ref 0.2–1.2)
BUN SERPL-MCNC: 21 MG/DL — HIGH (ref 10–20)
CALCIUM SERPL-MCNC: 10.1 MG/DL — SIGNIFICANT CHANGE UP (ref 8.5–10.1)
CHLORIDE SERPL-SCNC: 103 MMOL/L — SIGNIFICANT CHANGE UP (ref 98–110)
CO2 SERPL-SCNC: 26 MMOL/L — SIGNIFICANT CHANGE UP (ref 17–32)
CREAT SERPL-MCNC: 0.9 MG/DL — SIGNIFICANT CHANGE UP (ref 0.7–1.5)
CULTURE RESULTS: SIGNIFICANT CHANGE UP
EOSINOPHIL # BLD AUTO: 0.18 K/UL — SIGNIFICANT CHANGE UP (ref 0–0.7)
EOSINOPHIL NFR BLD AUTO: 2.9 % — SIGNIFICANT CHANGE UP (ref 0–8)
GLUCOSE BLDC GLUCOMTR-MCNC: 105 MG/DL — HIGH (ref 70–99)
GLUCOSE BLDC GLUCOMTR-MCNC: 108 MG/DL — HIGH (ref 70–99)
GLUCOSE BLDC GLUCOMTR-MCNC: 116 MG/DL — HIGH (ref 70–99)
GLUCOSE BLDC GLUCOMTR-MCNC: 157 MG/DL — HIGH (ref 70–99)
GLUCOSE BLDC GLUCOMTR-MCNC: 201 MG/DL — HIGH (ref 70–99)
GLUCOSE SERPL-MCNC: 192 MG/DL — HIGH (ref 70–99)
HCT VFR BLD CALC: 28.1 % — LOW (ref 37–47)
HGB BLD-MCNC: 8.7 G/DL — LOW (ref 12–16)
IMM GRANULOCYTES NFR BLD AUTO: 0.5 % — HIGH (ref 0.1–0.3)
LYMPHOCYTES # BLD AUTO: 1.38 K/UL — SIGNIFICANT CHANGE UP (ref 1.2–3.4)
LYMPHOCYTES # BLD AUTO: 22.5 % — SIGNIFICANT CHANGE UP (ref 20.5–51.1)
MAGNESIUM SERPL-MCNC: 2.5 MG/DL — HIGH (ref 1.8–2.4)
MCHC RBC-ENTMCNC: 26.3 PG — LOW (ref 27–31)
MCHC RBC-ENTMCNC: 31 G/DL — LOW (ref 32–37)
MCV RBC AUTO: 84.9 FL — SIGNIFICANT CHANGE UP (ref 81–99)
METHOD TYPE: SIGNIFICANT CHANGE UP
METHOD TYPE: SIGNIFICANT CHANGE UP
MONOCYTES # BLD AUTO: 0.51 K/UL — SIGNIFICANT CHANGE UP (ref 0.1–0.6)
MONOCYTES NFR BLD AUTO: 8.3 % — SIGNIFICANT CHANGE UP (ref 1.7–9.3)
NEUTROPHILS # BLD AUTO: 4.02 K/UL — SIGNIFICANT CHANGE UP (ref 1.4–6.5)
NEUTROPHILS NFR BLD AUTO: 65.5 % — SIGNIFICANT CHANGE UP (ref 42.2–75.2)
NRBC # BLD: 0 /100 WBCS — SIGNIFICANT CHANGE UP (ref 0–0)
ORGANISM # SPEC MICROSCOPIC CNT: SIGNIFICANT CHANGE UP
PLATELET # BLD AUTO: 239 K/UL — SIGNIFICANT CHANGE UP (ref 130–400)
POTASSIUM SERPL-MCNC: 5.5 MMOL/L — HIGH (ref 3.5–5)
POTASSIUM SERPL-SCNC: 5.5 MMOL/L — HIGH (ref 3.5–5)
PROT SERPL-MCNC: 6.7 G/DL — SIGNIFICANT CHANGE UP (ref 6–8)
RBC # BLD: 3.31 M/UL — LOW (ref 4.2–5.4)
RBC # FLD: 13.9 % — SIGNIFICANT CHANGE UP (ref 11.5–14.5)
SODIUM SERPL-SCNC: 140 MMOL/L — SIGNIFICANT CHANGE UP (ref 135–146)
SPECIMEN SOURCE: SIGNIFICANT CHANGE UP
WBC # BLD: 6.14 K/UL — SIGNIFICANT CHANGE UP (ref 4.8–10.8)
WBC # FLD AUTO: 6.14 K/UL — SIGNIFICANT CHANGE UP (ref 4.8–10.8)

## 2019-09-03 PROCEDURE — 93010 ELECTROCARDIOGRAM REPORT: CPT

## 2019-09-03 PROCEDURE — 71045 X-RAY EXAM CHEST 1 VIEW: CPT | Mod: 26

## 2019-09-03 PROCEDURE — 99232 SBSQ HOSP IP/OBS MODERATE 35: CPT

## 2019-09-03 RX ORDER — SOD SULF/SODIUM/NAHCO3/KCL/PEG
4000 SOLUTION, RECONSTITUTED, ORAL ORAL ONCE
Refills: 0 | Status: COMPLETED | OUTPATIENT
Start: 2019-09-03 | End: 2019-09-03

## 2019-09-03 RX ADMIN — Medication 5 MILLIGRAM(S): at 21:33

## 2019-09-03 RX ADMIN — AMLODIPINE BESYLATE 5 MILLIGRAM(S): 2.5 TABLET ORAL at 05:38

## 2019-09-03 RX ADMIN — Medication 4000 MILLILITER(S): at 16:13

## 2019-09-03 RX ADMIN — LOSARTAN POTASSIUM 50 MILLIGRAM(S): 100 TABLET, FILM COATED ORAL at 05:38

## 2019-09-03 RX ADMIN — Medication 650 MILLIGRAM(S): at 08:04

## 2019-09-03 RX ADMIN — Medication 500 MILLIGRAM(S): at 17:05

## 2019-09-03 RX ADMIN — Medication 0.5 MILLIGRAM(S): at 18:31

## 2019-09-03 RX ADMIN — Medication 500 MILLIGRAM(S): at 05:38

## 2019-09-03 RX ADMIN — Medication 10 MILLIGRAM(S): at 17:05

## 2019-09-03 RX ADMIN — ENOXAPARIN SODIUM 40 MILLIGRAM(S): 100 INJECTION SUBCUTANEOUS at 11:11

## 2019-09-03 RX ADMIN — Medication 1: at 11:11

## 2019-09-03 RX ADMIN — ATORVASTATIN CALCIUM 20 MILLIGRAM(S): 80 TABLET, FILM COATED ORAL at 21:33

## 2019-09-03 RX ADMIN — Medication 2: at 07:40

## 2019-09-03 RX ADMIN — Medication 650 MILLIGRAM(S): at 18:30

## 2019-09-03 RX ADMIN — POLYETHYLENE GLYCOL 3350 17 GRAM(S): 17 POWDER, FOR SOLUTION ORAL at 05:39

## 2019-09-03 RX ADMIN — Medication 10 MILLIGRAM(S): at 21:33

## 2019-09-03 NOTE — CONSULT NOTE ADULT - CONSULT REQUESTED DATE/TIME
01-Sep-2019 12:05
03-Sep-2019 13:38
29-Aug-2019 18:09
30-Aug-2019 06:44
30-Aug-2019 18:05
30-Aug-2019 15:01

## 2019-09-03 NOTE — PROGRESS NOTE ADULT - ASSESSMENT
68F with PMHx of uncontrolled DM (non-compliant), viral myopathy, recent labial abscess s/p ID and recent admission for fall and hip fracture s/p fixation presents for BRBPR due to severe constipation.     BRBPR due to severe constipation/opiod use  - s/p disimpaction in the ED with significant improvement in symptoms  - Still with hard BM - c/w senna, colace, Miralax and enemas  - Colonoscopy with Dr Branch tomorrow - clears today, prep  - NPO after midnight    Urinary retention, acute  suspect multifactorial, due to mass effect of constipation versus bladder/uterus prolapse + UA  - montejo placed with >1L output, failed TOV, s/p montejo replacement with another >1L out  - No hydro or RIC  - Encouraged pt to ambulate, c/w monitoring  - URine cx: with citrobacter Koseri, can switch to Ciprofloxacin 500 po Q12H x6 more days  - c/w montejo for now  - outpatient uroGYN f/u - patient will likely need to be discharged with montejo    Hyperkalemia  hold arb today, if repeat >5.5, treat with insulin/D50/Kayexelate    Chest pain: suspect anxiety related - resolved  - CE negative x2, EKG unchanged  - C/w statin. Losartan to be held at this time given hyperkalemia    DM: Patient was non-compliant, HbA1C ~15  - c/w insulin    Recent Hip replacement:  - patient ambulating around unit    #Progress Note Handoff  Pending (specify): constipation improvement, Colonoscopy   Family discussion: Plan of care discussed with patient, aware and agreeable   Disposition:  home, anticipate dc tomorrow after colonoscopy 68F with PMHx of uncontrolled DM (non-compliant), viral myopathy, recent labial abscess s/p ID and recent admission for fall and hip fracture s/p fixation presents for BRBPR due to severe constipation.     BRBPR due to severe constipation/opiod use  - s/p disimpaction in the ED with significant improvement in symptoms  - Still with hard BM - c/w senna, colace, Miralax and enemas  - Colonoscopy with Dr Branch tomorrow - clears today, prep  - NPO after midnight    Urinary retention, acute  suspect multifactorial, due to mass effect of constipation versus bladder/uterus prolapse + UA  - montejo placed with >1L output, failed TOV, s/p montejo replacement with another >1L out  - No hydro or RIC  - Encouraged pt to ambulate, c/w monitoring  - URine cx: with citrobacter Koseri, can switch to Ciprofloxacin 500 po Q12H x6 more days  - c/w montejo for now  - outpatient uroGYN f/u - patient will likely need to be discharged with montejo    Hyperkalemia  hold arb today, if repeat >5.5, treat with insulin/D50/Kayexelate    Chest pain: suspect anxiety related - resolved  - CE negative x2, EKG unchanged  - C/w statin. Losartan to be held at this time given hyperkalemia  - can increase amlodipine to 10mg if BP elevated    DM: Patient was non-compliant, HbA1C ~15  - c/w insulin    Recent Hip replacement:  - patient ambulating around unit    #Progress Note Handoff  Pending (specify): constipation improvement, Colonoscopy   Family discussion: Plan of care discussed with patient, aware and agreeable   Disposition:  home, anticipate dc tomorrow after colonoscopy

## 2019-09-03 NOTE — CONSULT NOTE ADULT - ASSESSMENT
lgib / change inbm/ anemia  plan- colonoscopy tomorrow   prep with golytly today  f/u renal   f/u cbc

## 2019-09-03 NOTE — CONSULT NOTE ADULT - REASON FOR ADMISSION
hemorrhoidal bleed, chest pain
BRBPR and chest pain
hemorrhoidal bleed, chest pain

## 2019-09-03 NOTE — CONSULT NOTE ADULT - SUBJECTIVE AND OBJECTIVE BOX
Chief Complaint: Patient is a 68y old  Female who presents with a chief complaint of hemorrhoidal bleed, chest pain (03 Sep 2019 11:51)gi called for change in bm / constipation and rectal bleeding        Review Of Systems:    Medications:  acetaminophen   Tablet .. 650 milliGRAM(s) Oral every 6 hours PRN  ALPRAZolam 0.5 milliGRAM(s) Oral every 6 hours PRN  amLODIPine   Tablet 5 milliGRAM(s) Oral daily  atorvastatin 20 milliGRAM(s) Oral at bedtime  bisacodyl 10 milliGRAM(s) Oral every 4 hours  bisacodyl Suppository 10 milliGRAM(s) Rectal daily PRN  chlorhexidine 4% Liquid 1 Application(s) Topical <User Schedule>  ciprofloxacin     Tablet 500 milliGRAM(s) Oral every 12 hours  dextrose 40% Gel 15 Gram(s) Oral once PRN  dextrose 5%. 1000 milliLiter(s) IV Continuous <Continuous>  dextrose 50% Injectable 12.5 Gram(s) IV Push once  dextrose 50% Injectable 25 Gram(s) IV Push once  dextrose 50% Injectable 25 Gram(s) IV Push once  enoxaparin Injectable 40 milliGRAM(s) SubCutaneous daily  glucagon  Injectable 1 milliGRAM(s) IntraMuscular once PRN  hydrocortisone hemorrhoidal Suppository 1 Suppository(s) Rectal at bedtime  insulin lispro (HumaLOG) corrective regimen sliding scale   SubCutaneous three times a day before meals  melatonin 5 milliGRAM(s) Oral at bedtime  polyethylene glycol 3350 17 Gram(s) Oral two times a day  polyethylene glycol/electrolyte Solution. 4000 milliLiter(s) Oral once  senna 2 Tablet(s) Oral at bedtime      PMHX/PSHX:  Female bladder prolapse  Diabetes  No pertinent past medical history  History of repair of hip fracture      Family history:  FH: myocardial infarction  FH: stomach cancer      Social History:       Allergies:  No Known Allergies            PHYSICAL EXAM:   Vital Signs:  Vital Signs Last 24 Hrs  T(C): 36.5 (03 Sep 2019 05:34), Max: 36.8 (02 Sep 2019 21:40)  T(F): 97.7 (03 Sep 2019 05:34), Max: 98.2 (02 Sep 2019 21:40)  HR: 80 (03 Sep 2019 05:34) (80 - 87)  BP: 128/64 (03 Sep 2019 05:34) (128/64 - 142/62)  BP(mean): --  RR: 20 (03 Sep 2019 05:34) (18 - 20)  SpO2: --  Daily     Daily     T(C): 36.5 (09-03-19 @ 05:34), Max: 36.8 (09-02-19 @ 21:40)  HR: 80 (09-03-19 @ 05:34) (80 - 87)  BP: 128/64 (09-03-19 @ 05:34) (128/64 - 142/62)  RR: 20 (09-03-19 @ 05:34) (18 - 20)  SpO2: --    GENERAL:  Appears stated age, well-groomed, well-nourished, no distress  ABDOMEN:  Soft, non-tender, non-distended, normoactive bowel sounds,  no masses ,no hepato-splenomegaly, no signs of chronic liver disease        LABS:                        8.7    6.14  )-----------( 239      ( 03 Sep 2019 07:16 )             28.1     09-03    140  |  103  |  21<H>  ----------------------------<  192<H>  5.5<H>   |  26  |  0.9    Ca    10.1      03 Sep 2019 07:16  Mg     2.5     09-03    TPro  6.7  /  Alb  3.7  /  TBili  <0.2  /  DBili  x   /  AST  10  /  ALT  <5  /  AlkPhos  102  09-03    LIVER FUNCTIONS - ( 03 Sep 2019 07:16 )  Alb: 3.7 g/dL / Pro: 6.7 g/dL / ALK PHOS: 102 U/L / ALT: <5 U/L / AST: 10 U/L / GGT: x                   Imaging:      OTHER STUDIES USED FOR CORRELATION: None.       FINDINGS:    LOWER CHEST: Normal size heart. Dependent atelectasis.    HEPATOBILIARY:  Cholelithiasis in a distended gallbladder. Subcentimeter   right hepatic hypodensity to small to characterize.    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    PANCREAS: Unremarkable.    ADRENAL GLANDS: Unremarkable.    KIDNEYS: 1.5 cm indeterminate hypoattenuating left renal lesion (series 7   image 43) measuring higher than simple fluid in attenuation even on   noncontrast images. Additional hypodensities too small to characterize.   Bilateral extrarenal pelves. No hydronephrosis.    ABDOMINOPELVIC NODES: No lymphadenopathy.    PELVIC ORGANS: Markedly distended urinary bladder.    PERITONEUM/MESENTERY/BOWEL: No CT evidence of contrast extravasation into   the GI tract. Moderate stool in the colon. No bowel obstruction, ascites   or intraperitoneal free air.    BONES/SOFT TISSUES: Status post left intramedullary nail for   intertrochanteric fracture. Fracture line remains visible.    VASCULAR: Atherosclerotic calcifications.      IMPRESSION:    1.  No CTA evidence of acute gastrointestinal hemorrhage.  2.  Markedly distended urinary bladder. Correlate for urinary retention.  3.  Indeterminate left upper pole renal lesion. Further evaluation with   contrast-enhanced MRI on a nonemergent basis recommended.

## 2019-09-03 NOTE — CHART NOTE - NSCHARTNOTEFT_GEN_A_CORE
Registered Dietitian Limited Assessment:  -Pt to be assessed for NPO/clear liquid diet, however, diet ordered in preparation for colonoscopy to be completed tomorrow 9/4. Initial assessment to be completed when colonoscopy results available (either 9/4 or 9/5) to determine most appropriate nutrition interventions based on findings.

## 2019-09-03 NOTE — PROGRESS NOTE ADULT - ASSESSMENT
68F with PMHx of uncontrolled DM (non-compliant), viral myopathy, recent labial abscess s/p ID and recent admission for fall and hip fracture s/p fixation presents for LGIB bleed.     LGIB, likely hemorrhoidal due to constipation: Hg stable, VSS  - colonoscopy tomorrow (wednesday)  - Monitor CBC, Hb stable  - Sitz bath  - Laxatives      Chest pain: Symptoms appear non-cardiac but due to risk factors patient was admitted to tele  - Consider inpatient vs outpatient stress test when bleeding resolves  - anxiety related, resolves with benzos  - C/w statin  - C/w ACEI    HTN:  - ACEI    Constipation: Patient was taking opiod pain meds and immobile following surgery; disimpacted in ED, resolved  - Senna  - Miralax  - mag citrate  - No opioids    Distended bladder with bladder prolapse: Pt initially refusing montejo, agreeing to straight cath for now; per patient's daughter has had prolapse for many years  - montejo placed, output 1L  - No hydro or RIC  - uro-gyn consulted, to f/u opt  - uro consulted    DM: Patient was non-compliant, HbA1C ~15; just recently restarted on medications  - on sliding scale  - MOnitor FS    Renal mass:  - Outpatient MRI    DVT ppx: Lovanox 40mg  Dispo: From home

## 2019-09-03 NOTE — CONSULT NOTE ADULT - CONSULT REASON
BRBPR
Urinary Retention Difficult [place]
bladder prolapse w/ retention
constipation
rectal bleedimg
recent L hip Fx/ Hemorrhids

## 2019-09-04 ENCOUNTER — RESULT REVIEW (OUTPATIENT)
Age: 69
End: 2019-09-04

## 2019-09-04 ENCOUNTER — TRANSCRIPTION ENCOUNTER (OUTPATIENT)
Age: 69
End: 2019-09-04

## 2019-09-04 VITALS — RESPIRATION RATE: 18 BRPM | DIASTOLIC BLOOD PRESSURE: 76 MMHG | HEART RATE: 82 BPM | SYSTOLIC BLOOD PRESSURE: 165 MMHG

## 2019-09-04 LAB
ALBUMIN SERPL ELPH-MCNC: 3.6 G/DL — SIGNIFICANT CHANGE UP (ref 3.5–5.2)
ALP SERPL-CCNC: 93 U/L — SIGNIFICANT CHANGE UP (ref 30–115)
ALT FLD-CCNC: <5 U/L — SIGNIFICANT CHANGE UP (ref 0–41)
ANION GAP SERPL CALC-SCNC: 13 MMOL/L — SIGNIFICANT CHANGE UP (ref 7–14)
APTT BLD: 32.5 SEC — SIGNIFICANT CHANGE UP (ref 27–39.2)
AST SERPL-CCNC: 9 U/L — SIGNIFICANT CHANGE UP (ref 0–41)
BASOPHILS # BLD AUTO: 0.03 K/UL — SIGNIFICANT CHANGE UP (ref 0–0.2)
BASOPHILS NFR BLD AUTO: 0.6 % — SIGNIFICANT CHANGE UP (ref 0–1)
BILIRUB SERPL-MCNC: 0.2 MG/DL — SIGNIFICANT CHANGE UP (ref 0.2–1.2)
BUN SERPL-MCNC: 13 MG/DL — SIGNIFICANT CHANGE UP (ref 10–20)
CALCIUM SERPL-MCNC: 9.9 MG/DL — SIGNIFICANT CHANGE UP (ref 8.5–10.1)
CHLORIDE SERPL-SCNC: 104 MMOL/L — SIGNIFICANT CHANGE UP (ref 98–110)
CO2 SERPL-SCNC: 26 MMOL/L — SIGNIFICANT CHANGE UP (ref 17–32)
CREAT SERPL-MCNC: 0.7 MG/DL — SIGNIFICANT CHANGE UP (ref 0.7–1.5)
EOSINOPHIL # BLD AUTO: 0.15 K/UL — SIGNIFICANT CHANGE UP (ref 0–0.7)
EOSINOPHIL NFR BLD AUTO: 2.8 % — SIGNIFICANT CHANGE UP (ref 0–8)
GLUCOSE BLDC GLUCOMTR-MCNC: 116 MG/DL — HIGH (ref 70–99)
GLUCOSE BLDC GLUCOMTR-MCNC: 121 MG/DL — HIGH (ref 70–99)
GLUCOSE BLDC GLUCOMTR-MCNC: 144 MG/DL — HIGH (ref 70–99)
GLUCOSE BLDC GLUCOMTR-MCNC: 148 MG/DL — HIGH (ref 70–99)
GLUCOSE SERPL-MCNC: 142 MG/DL — HIGH (ref 70–99)
HCT VFR BLD CALC: 28.6 % — LOW (ref 37–47)
HGB BLD-MCNC: 8.9 G/DL — LOW (ref 12–16)
IMM GRANULOCYTES NFR BLD AUTO: 0.6 % — HIGH (ref 0.1–0.3)
INR BLD: 1.08 RATIO — SIGNIFICANT CHANGE UP (ref 0.65–1.3)
LYMPHOCYTES # BLD AUTO: 1.4 K/UL — SIGNIFICANT CHANGE UP (ref 1.2–3.4)
LYMPHOCYTES # BLD AUTO: 26.2 % — SIGNIFICANT CHANGE UP (ref 20.5–51.1)
MAGNESIUM SERPL-MCNC: 1.9 MG/DL — SIGNIFICANT CHANGE UP (ref 1.8–2.4)
MCHC RBC-ENTMCNC: 26.1 PG — LOW (ref 27–31)
MCHC RBC-ENTMCNC: 31.1 G/DL — LOW (ref 32–37)
MCV RBC AUTO: 83.9 FL — SIGNIFICANT CHANGE UP (ref 81–99)
MONOCYTES # BLD AUTO: 0.39 K/UL — SIGNIFICANT CHANGE UP (ref 0.1–0.6)
MONOCYTES NFR BLD AUTO: 7.3 % — SIGNIFICANT CHANGE UP (ref 1.7–9.3)
NEUTROPHILS # BLD AUTO: 3.35 K/UL — SIGNIFICANT CHANGE UP (ref 1.4–6.5)
NEUTROPHILS NFR BLD AUTO: 62.5 % — SIGNIFICANT CHANGE UP (ref 42.2–75.2)
NRBC # BLD: 0 /100 WBCS — SIGNIFICANT CHANGE UP (ref 0–0)
PLATELET # BLD AUTO: 321 K/UL — SIGNIFICANT CHANGE UP (ref 130–400)
POTASSIUM SERPL-MCNC: 4.4 MMOL/L — SIGNIFICANT CHANGE UP (ref 3.5–5)
POTASSIUM SERPL-SCNC: 4.4 MMOL/L — SIGNIFICANT CHANGE UP (ref 3.5–5)
PROT SERPL-MCNC: 6.3 G/DL — SIGNIFICANT CHANGE UP (ref 6–8)
PROTHROM AB SERPL-ACNC: 12.4 SEC — SIGNIFICANT CHANGE UP (ref 9.95–12.87)
RBC # BLD: 3.41 M/UL — LOW (ref 4.2–5.4)
RBC # FLD: 13.7 % — SIGNIFICANT CHANGE UP (ref 11.5–14.5)
SODIUM SERPL-SCNC: 143 MMOL/L — SIGNIFICANT CHANGE UP (ref 135–146)
WBC # BLD: 5.35 K/UL — SIGNIFICANT CHANGE UP (ref 4.8–10.8)
WBC # FLD AUTO: 5.35 K/UL — SIGNIFICANT CHANGE UP (ref 4.8–10.8)

## 2019-09-04 PROCEDURE — 88341 IMHCHEM/IMCYTCHM EA ADD ANTB: CPT | Mod: 26

## 2019-09-04 PROCEDURE — 88313 SPECIAL STAINS GROUP 2: CPT | Mod: 26

## 2019-09-04 PROCEDURE — 88305 TISSUE EXAM BY PATHOLOGIST: CPT | Mod: 26

## 2019-09-04 PROCEDURE — 99239 HOSP IP/OBS DSCHRG MGMT >30: CPT

## 2019-09-04 PROCEDURE — 88312 SPECIAL STAINS GROUP 1: CPT | Mod: 26

## 2019-09-04 PROCEDURE — 88342 IMHCHEM/IMCYTCHM 1ST ANTB: CPT | Mod: 26

## 2019-09-04 RX ORDER — CIPROFLOXACIN LACTATE 400MG/40ML
1 VIAL (ML) INTRAVENOUS
Qty: 8 | Refills: 0
Start: 2019-09-04 | End: 2019-09-07

## 2019-09-04 RX ORDER — POLYETHYLENE GLYCOL 3350 17 G/17G
17 POWDER, FOR SOLUTION ORAL
Qty: 170 | Refills: 0
Start: 2019-09-04 | End: 2019-09-08

## 2019-09-04 RX ADMIN — AMLODIPINE BESYLATE 5 MILLIGRAM(S): 2.5 TABLET ORAL at 05:56

## 2019-09-04 RX ADMIN — Medication 650 MILLIGRAM(S): at 03:26

## 2019-09-04 RX ADMIN — Medication 500 MILLIGRAM(S): at 05:56

## 2019-09-04 RX ADMIN — POLYETHYLENE GLYCOL 3350 17 GRAM(S): 17 POWDER, FOR SOLUTION ORAL at 05:57

## 2019-09-04 NOTE — DISCHARGE NOTE PROVIDER - NSDCCPCAREPLAN_GEN_ALL_CORE_FT
PRINCIPAL DISCHARGE DIAGNOSIS  Diagnosis: External hemorrhoids  Assessment and Plan of Treatment: Please follow up with Dr. Solis outpatient to review you colonoscopy.  Please continue to take you laxiatives as prescribed.      SECONDARY DISCHARGE DIAGNOSES  Diagnosis: Leukocytosis  Assessment and Plan of Treatment:     Diagnosis: Chest pain  Assessment and Plan of Treatment:     Diagnosis: Rectal bleed  Assessment and Plan of Treatment:

## 2019-09-04 NOTE — DISCHARGE NOTE PROVIDER - PROVIDER TOKENS
PROVIDER:[TOKEN:[[id number]]],FREE:[LAST:[Dr. Solis],PHONE:[(   )    -],FAX:[(   )    -],ADDRESS:[Gastroenterology]],FREE:[LAST:[PCP],PHONE:[(   )    -],FAX:[(   )    -]]

## 2019-09-04 NOTE — PROGRESS NOTE ADULT - SUBJECTIVE AND OBJECTIVE BOX
HPI  Patient is a 68y old Female who presents with a chief complaint of hemorrhoidal bleed, chest pain (03 Sep 2019 13:38)    Currently admitted to medicine with the primary diagnosis of External hemorrhoids     Today is hospital day 5d.     INTERVAL HPI / OVERNIGHT EVENTS:  Patient was examined and seen at bedside. This morning she is resting comfortably in bed and reports no new issues or overnight events.   Pt feeling better today, with soft stools    ROS: Otherwise unremarkable     PAST MEDICAL & SURGICAL HISTORY  Female bladder prolapse  Diabetes  History of repair of hip fracture    ALLERGIES  No Known Allergies    MEDICATIONS  STANDING MEDICATIONS  amLODIPine   Tablet 5 milliGRAM(s) Oral daily  atorvastatin 20 milliGRAM(s) Oral at bedtime  bisacodyl 10 milliGRAM(s) Oral every 4 hours  chlorhexidine 4% Liquid 1 Application(s) Topical <User Schedule>  ciprofloxacin     Tablet 500 milliGRAM(s) Oral every 12 hours  dextrose 5%. 1000 milliLiter(s) IV Continuous <Continuous>  dextrose 50% Injectable 12.5 Gram(s) IV Push once  dextrose 50% Injectable 25 Gram(s) IV Push once  dextrose 50% Injectable 25 Gram(s) IV Push once  enoxaparin Injectable 40 milliGRAM(s) SubCutaneous daily  hydrocortisone hemorrhoidal Suppository 1 Suppository(s) Rectal at bedtime  insulin lispro (HumaLOG) corrective regimen sliding scale   SubCutaneous three times a day before meals  melatonin 5 milliGRAM(s) Oral at bedtime  polyethylene glycol 3350 17 Gram(s) Oral two times a day  polyethylene glycol/electrolyte Solution. 4000 milliLiter(s) Oral once  senna 2 Tablet(s) Oral at bedtime    PRN MEDICATIONS  acetaminophen   Tablet .. 650 milliGRAM(s) Oral every 6 hours PRN  ALPRAZolam 0.5 milliGRAM(s) Oral every 6 hours PRN  bisacodyl Suppository 10 milliGRAM(s) Rectal daily PRN  dextrose 40% Gel 15 Gram(s) Oral once PRN  glucagon  Injectable 1 milliGRAM(s) IntraMuscular once PRN    VITALS:  T(F): 99.5  HR: 83  BP: 121/58  RR: 18  SpO2: --    PHYSICAL EXAM  GEN: NAD, Resting comfortably in bed  PULM: Clear to auscultation bilaterally, No wheezes  CVS: Regular rate and rhythm, S1-S2, no murmurs  ABD: Soft, non-tender, non-distended, no guarding  EXT: No edema  NEURO: AAOx3, no focal deficits    LABS                        8.7    6.14  )-----------( 239      ( 03 Sep 2019 07:16 )             28.1     09-03    140  |  103  |  21<H>  ----------------------------<  192<H>  5.5<H>   |  26  |  0.9    Ca    10.1      03 Sep 2019 07:16  Mg     2.5     09-03    TPro  6.7  /  Alb  3.7  /  TBili  <0.2  /  DBili  x   /  AST  10  /  ALT  <5  /  AlkPhos  102  09-03              Culture - Urine (collected 01 Sep 2019 04:20)  Source: .Urine Catheterized  Preliminary Report (02 Sep 2019 16:57):    >100,000 CFU/ml Gram Negative Rods    >100,000 CFU/ml Gram positive organisms          RADIOLOGY
DIAGNOSIS:   HOSPITAL DAY #:    STATUS POST:    POST OPERATIVE DAY #:     Vital Signs Last 24 Hrs  T(C): 36.3 (03 Sep 2019 21:24), Max: 37.5 (03 Sep 2019 13:40)  T(F): 97.4 (03 Sep 2019 21:24), Max: 99.5 (03 Sep 2019 13:40)  HR: 85 (03 Sep 2019 21:24) (80 - 85)  BP: 168/75 (03 Sep 2019 21:24) (121/58 - 168/75)  BP(mean): --  RR: 18 (03 Sep 2019 21:24) (18 - 20)  SpO2: 97% (03 Sep 2019 21:24) (97% - 97%)    SUBJECTIVE: Pt seen    Pain: YES  [ ]   NO [ ]   Nausea: [ ] YES [x ] NO           Vomiting: [ ] YES [x ] NO  Flatus: [x ] YES [ ] NO             Bowel Movement: [x ] YES [ ] NO     Void: [ ]YES [ ]No      ALEE DRAINAGE: SIGNIFICANT [ ]   NOT SIGNIFICANT [ ]   NOT APPLICABLE [ ]  YES [ ] NO    General Appearance: Appears well, NAD  Neck: Supple  Chest: Equal expansion bilaterally, equal breath sounds  CV: Pulse regular presently  Abdomen: Soft [x ] YES [ ]NO  DISTENDED [ ] YES [x ] NO TENDERNESS [ ]YES [x ]NO  INCISIONS: HEALING WELL [ ] YES  [ ] NO ERYTHEMA [ ] YES [ ] NO DRAINAGE [ ] YES  [ ] NO  Extremities: Grossly symmetric, CALF TENDERNESS [ ] YES  [ ] NO      LABS:                        8.7    6.14  )-----------( 239      ( 03 Sep 2019 07:16 )             28.1     09-03    140  |  103  |  21<H>  ----------------------------<  192<H>  5.5<H>   |  26  |  0.9    Ca    10.1      03 Sep 2019 07:16  Mg     2.5     09-03    TPro  6.7  /  Alb  3.7  /  TBili  <0.2  /  DBili  x   /  AST  10  /  ALT  <5  /  AlkPhos  102  09-03            ASSESSMENT:     GOOD POST OP COURSE [ ]  YES  [ ] NO  CONDITION IMPROVING  []  YES  [ ]  NO          PLAN: for colonoscopy in am    CONTINUE PRESENT MANAGEMENT  [ ] YES  [ ] NO
DIAGNOSIS:   HOSPITAL DAY #:    STATUS POST:    POST OPERATIVE DAY #:     Vital Signs Last 24 Hrs  T(C): 37.2 (04 Sep 2019 15:07), Max: 37.2 (04 Sep 2019 15:04)  T(F): 99 (04 Sep 2019 15:04), Max: 99 (04 Sep 2019 15:04)  HR: 82 (04 Sep 2019 16:15) (64 - 99)  BP: 165/76 (04 Sep 2019 16:15) (106/58 - 165/76)  BP(mean): 87 (04 Sep 2019 15:07) (87 - 87)  RR: 18 (04 Sep 2019 16:15) (18 - 18)  SpO2: 95% (04 Sep 2019 15:52) (94% - 100%)    SUBJECTIVE: Pt seen    Pain: YES  [ ]   NO [ ]   Nausea: [ ] YES [ ] NO           Vomiting: [ ] YES [ ] NO  Flatus: [ ] YES [ ] NO             Bowel Movement: [ ] YES [ ] NO     Void: [ ]YES [ ]No      ALEE DRAINAGE: SIGNIFICANT [ ]   NOT SIGNIFICANT [ ]   NOT APPLICABLE [ ]  YES [ ] NO    General Appearance: Appears well, NAD  Neck: Supple  Chest: Equal expansion bilaterally, equal breath sounds  CV: Pulse regular presently  Abdomen: Soft [x ] YES [ ]NO  DISTENDED [ ] YES [x ] NO TENDERNESS [ ]YES [x ]NO  INCISIONS: HEALING WELL [ ] YES  [ ] NO ERYTHEMA [ ] YES [ ] NO DRAINAGE [ ] YES  [ ] NO  Extremities: Grossly symmetric, CALF TENDERNESS [ ] YES  [ ] NO      LABS:                        8.9    5.35  )-----------( 321      ( 04 Sep 2019 06:58 )             28.6     09-04    143  |  104  |  13  ----------------------------<  142<H>  4.4   |  26  |  0.7    Ca    9.9      04 Sep 2019 06:58  Mg     1.9     09-04    TPro  6.3  /  Alb  3.6  /  TBili  0.2  /  DBili  x   /  AST  9   /  ALT  <5  /  AlkPhos  93  09-04    PT/INR - ( 04 Sep 2019 06:58 )   PT: 12.40 sec;   INR: 1.08 ratio         PTT - ( 04 Sep 2019 06:58 )  PTT:32.5 sec        ASSESSMENT:     GOOD POST OP COURSE [ ]  YES  [ ] NO  CONDITION IMPROVING  []  YES  [ ]  NO          PLAN: for colonoscopy    CONTINUE PRESENT MANAGEMENT  [ ] YES  [ ] NO
GENERAL SURGERY PROGRESS NOTE     GUS WILBURN  [address] [place] [address] day :4d  POD:  Procedure:   Surgical Attending: Svetlana  Overnight events: no acute events    T(F): 98 (19 @ 21:04), Max: 98 (19 @ 21:04)  HR: 94 (19 @ 21:04) (87 - 94)  BP: 115/67 (19 @ 21:04) (113/66 - 143/60)  ABP: --  ABP(mean): --  RR: 20 (19 @ 21:04) (20 - 20)  SpO2: 97% (19 @ 07:06) (97% - 97%)      19 @ 07:01  -  19 @ 07:00  --------------------------------------------------------  IN:  Total IN: 0 mL    OUT:    Ureteral Catheter: 2600 mL  Total OUT: 2600 mL    Total NET: -2600 mL      19 @ 07:01  -  19 @ 03:14  --------------------------------------------------------  IN:  Total IN: 0 mL    OUT:    Ureteral Catheter: 1700 mL  Total OUT: 1700 mL    Total NET: -1700 mL        DIET/FLUIDS: dextrose 5%. 1000 milliLiter(s) IV Continuous <Continuous>    NG:                                                                                DRAINS:     BM:     EMESIS:     URINE:   19 @ 07:01  -  19 @ 07:00  --------------------------------------------------------  OUT: 2600 mL       GI proph:    AC/ proph:   ABx: cefTRIAXone   IVPB 1000 milliGRAM(s) IV Intermittent every 24 hours      PHYSICAL EXAM:  GENERAL: NAD, well-appearing  CHEST/LUNG: Clear to auscultation bilaterally  HEART: Regular rate and rhythm  ABDOMEN: Soft, Nontender, Nondistended;   EXTREMITIES:  No clubbing, cyanosis, or edema      LABS  Labs:  CAPILLARY BLOOD GLUCOSE      POCT Blood Glucose.: 283 mg/dL (01 Sep 2019 21:22)  POCT Blood Glucose.: 148 mg/dL (01 Sep 2019 16:01)  POCT Blood Glucose.: 249 mg/dL (01 Sep 2019 11:29)  POCT Blood Glucose.: 171 mg/dL (01 Sep 2019 07:20)                          9.4    5.60  )-----------( 379      ( 01 Sep 2019 06:10 )             30.5       Auto Immature Granulocyte %: 0.4 % (19 @ 06:10)  Auto Neutrophil %: 62.1 % (19 @ 06:10)        143  |  101  |  20  ----------------------------<  152<H>  4.7   |  28  |  0.8      Calcium, Total Serum: 10.4 mg/dL (19 @ 06:10)      LFTs:             7.4  | 0.3  | 9        ------------------[107     ( 01 Sep 2019 06:10 )  4.1  | x    | <5          Lipase:x      Amylase:x             Coags:            Urinalysis Basic - ( 01 Sep 2019 04:20 )    Color: Light Yellow / Appearance: Slightly Turbid / S.009 / pH: x  Gluc: x / Ketone: Negative  / Bili: Negative / Urobili: <2 mg/dL   Blood: x / Protein: 30 mg/dL / Nitrite: Negative   Leuk Esterase: Large / RBC: 1 /HPF /  /HPF   Sq Epi: x / Non Sq Epi: 0 /HPF / Bacteria: Many        Culture - Urine (collected 30 Aug 2019 07:42)  Source: .Urine Catheterized  Final Report (01 Sep 2019 20:59):    >100,000 CFU/ml Citrobacter koseri  Organism: Citrobacter koseri (01 Sep 2019 20:59)  Organism: Citrobacter koseri (01 Sep 2019 20:59)
GENERAL SURGERY PROGRESS NOTE     GUS WILBURN  [address] [place] [address] day :2d  POD:  Procedure:   Surgical Attending: Cristian Ortega  Overnight events: No acute events overnight, HG stable, mild episode of tachycardia overnight to 103.   T(F): 99.2 (19 @ 21:19), Max: 99.2 (19 @ 21:19)  HR: 103 (19 @ 21:19) (88 - 103)  BP: 129/66 (19 @ 21:19) (129/66 - 143/63)  ABP: --  ABP(mean): --  RR: 20 (19 @ 21:19) (18 - 20)  SpO2: 97% (19 @ 21:19) (97% - 97%)      19 @ 07:01  -  19 @ 07:00  --------------------------------------------------------  IN:  Total IN: 0 mL    OUT:    Ureteral Catheter: 2300 mL  Total OUT: 2300 mL    Total NET: -2300 mL      19 @ 07:01  -  19 @ 02:47  --------------------------------------------------------  IN:  Total IN: 0 mL    OUT:    Ureteral Catheter: 425 mL  Total OUT: 425 mL    Total NET: -425 mL        DIET/FLUIDS:   NG:                                                                                DRAINS:     BM:     EMESIS:     URINE:   19 @ 07:01  -  19 @ 07:00  --------------------------------------------------------  OUT: 2300 mL       GI proph:    AC/ proph:   ABx:     PHYSICAL EXAM:  GENERAL: NAD, well-appearing  CHEST/LUNG: Clear to auscultation bilaterally  HEART: Regular rate and rhythm  ABDOMEN: Soft, Nontender, Nondistended;   EXTREMITIES:  No clubbing, cyanosis, or edema      LABS  Labs:  CAPILLARY BLOOD GLUCOSE      POCT Blood Glucose.: 217 mg/dL (30 Aug 2019 21:36)  POCT Blood Glucose.: 320 mg/dL (30 Aug 2019 18:19)  POCT Blood Glucose.: 202 mg/dL (30 Aug 2019 11:16)  POCT Blood Glucose.: 187 mg/dL (30 Aug 2019 07:16)                          8.8    8.20  )-----------( 321      ( 30 Aug 2019 20:39 )             28.3       Auto Neutrophil %: 75.2 % (19 @ 17:30)  Auto Immature Granulocyte %: 0.4 % (19 @ 17:30)  Auto Neutrophil %: 71.6 % (19 @ 07:16)  Auto Immature Granulocyte %: 0.2 % (19 @ 07:16)        140  |  102  |  21<H>  ----------------------------<  170<H>  4.5   |  24  |  0.8      Calcium, Total Serum: 10.3 mg/dL (19 @ 07:16)      LFTs:             6.9  | 0.3  | 8        ------------------[100     ( 30 Aug 2019 07:16 )  3.9  | x    | <5          Lipase:x      Amylase:x             Coags:     12.60  ----< 1.10    ( 29 Aug 2019 15:30 )     31.1        CARDIAC MARKERS ( 30 Aug 2019 07:16 )  x     / <0.01 ng/mL / x     / x     / x      CARDIAC MARKERS ( 29 Aug 2019 15:30 )  x     / <0.01 ng/mL / x     / x     / x              Urinalysis Basic - ( 30 Aug 2019 07:42 )    Color: Yellow / Appearance: Slightly Turbid / S.023 / pH: x  Gluc: x / Ketone: Negative  / Bili: Negative / Urobili: <2 mg/dL   Blood: x / Protein: 100 mg/dL / Nitrite: Negative   Leuk Esterase: Large / RBC: 1 /HPF /  /HPF   Sq Epi: x / Non Sq Epi: 0 /HPF / Bacteria: Many
GENERAL SURGERY PROGRESS NOTE     GUS WILBURN  68y  Female  Hospital day: 2d    OVERNIGHT EVENTS: no acute events overnight. Patient continues to have bowel movements    T(F): 99 (08-30-19 @ 05:49), Max: 99 (08-30-19 @ 05:49)  HR: 88 (08-30-19 @ 05:49) (88 - 104)  BP: 143/63 (08-30-19 @ 05:49) (117/69 - 143/63)  RR: 20 (08-30-19 @ 05:49) (18 - 20)  SpO2: 99% (08-29-19 @ 21:11) (99% - 100%)    DIET/FLUIDS: Carb consistent    URINE:   08-29-19 @ 07:01  -  08-30-19 @ 07:00  --------------------------------------------------------  OUT: 2300 mL     Indwelling Urethral Catheter:     Connect To:  Straight Drainage/[place]    Indication:  Urinary Retention / Obstruction (08-30-19 @ 00:41)    PHYSICAL EXAM:  GENERAL: NAD, well-appearing  CHEST/LUNG: Clear to auscultation bilaterally  HEART: Regular rate and rhythm  ABDOMEN: Soft, Nontender, Nondistended;   EXTREMITIES:  No clubbing, cyanosis, or edema    Labs:  CAPILLARY BLOOD GLUCOSE  POCT Blood Glucose.: 187 mg/dL (30 Aug 2019 07:16)  POCT Blood Glucose.: 170 mg/dL (29 Aug 2019 21:09)  POCT Blood Glucose.: 107 mg/dL (29 Aug 2019 18:39)                        9.6    8.31  )-----------( 337      ( 30 Aug 2019 07:16 )             30.3       Auto Neutrophil %: 71.6 % (08-30-19 @ 07:16)  Auto Immature Granulocyte %: 0.2 % (08-30-19 @ 07:16)  Auto Neutrophil %: 73.0 % (08-29-19 @ 15:30)  Auto Immature Granulocyte %: 0.3 % (08-29-19 @ 15:30)    08-30    140  |  102  |  21<H>  ----------------------------<  170<H>  4.5   |  24  |  0.8    Calcium, Total Serum: 10.3 mg/dL (08-30-19 @ 07:16)    LFTs:             6.9  | 0.3  | 8        ------------------[100     ( 30 Aug 2019 07:16 )  3.9  | x    | <5           Coags:     12.60  ----< 1.10    ( 29 Aug 2019 15:30 )     31.1      CARDIAC MARKERS ( 30 Aug 2019 07:16 )  x     / <0.01 ng/mL / x     / x     / x      CARDIAC MARKERS ( 29 Aug 2019 15:30 )  x     / <0.01 ng/mL / x     / x     / x        RADIOLOGY & ADDITIONAL TESTS:    < from: Xray Chest 1 View- PORTABLE-Urgent (08.29.19 @ 15:54) >  Impression:    No radiographic evidence of acute cardiopulmonary disease.  Unchanged.  < end of copied text >    < from: CT Angio Abdomen and Pelvis w/ IV Cont (08.29.19 @ 17:18) >  IMPRESSION:  1.  No CTA evidence of acute gastrointestinal hemorrhage.  2.  Markedly distended urinary bladder. Correlate for urinary retention.  3.  Indeterminate left upper pole renal lesion. Further evaluation with   contrast-enhanced MRI on a nonemergent basis recommended.  4.  Status post fixation of left femoral fracture. Fracture line remains   visible.  < end of copied text >    < from: US Urinary Bladder (08.30.19 @ 05:28) >  IMPRESSION:  Markedly distended bladder with postvoid residual volume of 1155 mL,   compatible with urinary retention  Nonspecific debris within the urinary bladder. Correlate with urinalysis.  < end of copied text >
GENERAL SURGERY PROGRESS NOTE     GUS WILBURN  68y  Female  Hospital day: 4d    OVERNIGHT EVENTS: patient did not pass her trial of void and had 2L of urine in her bladder during bladder scan. Castellon catheter was reinserted. Patient continues to have small hard bowel movements.    T(F): 96 (19 @ 07:06), Max: 97.6 (19 @ 13:31)  HR: 87 (19 @ 07:06) (87 - 99)  BP: 143/60 (19 @ 07:06) (113/61 - 143/60)  RR: 20 (19 @ 07:06) (20 - 20)  SpO2: 97% (19 @ 07:06) (97% - 97%)    DIET/FLUIDS: Carb consistent  dextrose 5%. 1000 milliLiter(s) IV Continuous <Continuous>    URINE:   19 @ 07:01  -  19 @ 07:00  --------------------------------------------------------  OUT: 2600 mL     Indwelling Urethral Catheter:     Connect To:  Straight Drainage/[place]    Indication:  Urinary Retention / Obstruction (19 @ 19:43)    PHYSICAL EXAM:  GENERAL: NAD, well-appearing  CHEST/LUNG: Clear to auscultation bilaterally  HEART: Regular rate and rhythm  ABDOMEN: Soft, Nontender, Nondistended;   EXTREMITIES:  No clubbing, cyanosis, or edema    Labs:  CAPILLARY BLOOD GLUCOSE  POCT Blood Glucose.: 171 mg/dL (01 Sep 2019 07:20)  POCT Blood Glucose.: 188 mg/dL (31 Aug 2019 11:13)                        9.4    5.60  )-----------( 379      ( 01 Sep 2019 06:10 )             30.5       Auto Immature Granulocyte %: 0.4 % (19 @ 06:10)  Auto Neutrophil %: 62.1 % (19 @ 06:10)  Auto Neutrophil %: 70.8 % (19 @ 10:18)  Auto Immature Granulocyte %: 0.4 % (19 @ 10:18)        143  |  101  |  20  ----------------------------<  152<H>  4.7   |  28  |  0.8    Calcium, Total Serum: 10.4 mg/dL (19 @ 06:10)    LFTs:             7.4  | 0.3  | 9        ------------------[107     ( 01 Sep 2019 06:10 )  4.1  | x    | <5          Urinalysis Basic - ( 01 Sep 2019 04:20 )    Color: Light Yellow / Appearance: Slightly Turbid / S.009 / pH: x  Gluc: x / Ketone: Negative  / Bili: Negative / Urobili: <2 mg/dL   Blood: x / Protein: 30 mg/dL / Nitrite: Negative   Leuk Esterase: Large / RBC: 1 /HPF /  /HPF   Sq Epi: x / Non Sq Epi: 0 /HPF / Bacteria: Many    Culture - Urine (collected 30 Aug 2019 07:42)  Source: .Urine Catheterized  Preliminary Report (31 Aug 2019 17:35):    >100,000 CFU/ml Gram Negative Rods
GUS WILBURN  68y Female    CHIEF COMPLAINT:    Patient is a 68y old  Female who presents with a chief complaint of hemorrhoidal bleed, chest pain (01 Sep 2019 13:09)      INTERVAL HPI/OVERNIGHT EVENTS:    Patient seen and examined. WIth urinary retention overnight, s/p montejo replacement    ROS: All other systems are negative.    Vital Signs:    T(F): 96 (09-01-19 @ 07:06), Max: 96 (09-01-19 @ 07:06)  HR: 87 (09-01-19 @ 07:06) (87 - 87)  BP: 143/60 (09-01-19 @ 07:06) (143/60 - 143/60)  RR: 20 (09-01-19 @ 07:06) (20 - 20)  SpO2: 97% (09-01-19 @ 07:06) (97% - 97%)  I&O's Summary    31 Aug 2019 07:01  -  01 Sep 2019 07:00  --------------------------------------------------------  IN: 0 mL / OUT: 2600 mL / NET: -2600 mL    01 Sep 2019 07:01  -  01 Sep 2019 13:39  --------------------------------------------------------  IN: 0 mL / OUT: 800 mL / NET: -800 mL    POCT Blood Glucose.: 249 mg/dL (01 Sep 2019 11:29)  POCT Blood Glucose.: 171 mg/dL (01 Sep 2019 07:20)      PHYSICAL EXAM:    GENERAL:  NAD  SKIN: No rashes or lesions  HEENT: Atraumatic. Normocephalic.  NECK: Supple, No JVD. No lymphadenopathy.  PULMONARY: CTA B/L. No wheezing.   CVS: Normal S1, S2. Rate and Rhythm are regular.   ABDOMEN/GI: Soft, Nontender, Nondistended;   MSK:  No edema B/L LE. No clubbing or cyanosis  NEUROLOGIC:  No motor or sensory deficit.  PSYCH: Alert & oriented x 3, normal affect    Consultant(s) Notes Reviewed:  [x ] YES  [ ] NO  Care Discussed with Consultants/Other Providers [ x] YES  [ ] NO    LABS:                        9.4    5.60  )-----------( 379      ( 01 Sep 2019 06:10 )             30.5     09-01    143  |  101  |  20  ----------------------------<  152<H>  4.7   |  28  |  0.8    Ca    10.4<H>      01 Sep 2019 06:10  Mg     2.3     09-01    TPro  7.4  /  Alb  4.1  /  TBili  0.3  /  DBili  x   /  AST  9   /  ALT  <5  /  AlkPhos  107  09-01    Trop <0.01, CKMB --, CK --, 08-30-19 @ 07:16  Trop <0.01, CKMB --, CK --, 08-29-19 @ 15:30  Culture - Urine (collected 30 Aug 2019 07:42)  Source: .Urine Catheterized  Preliminary Report (31 Aug 2019 17:35):    >100,000 CFU/ml Gram Negative Rods    RADIOLOGY & ADDITIONAL TESTS:  Imaging or report Personally Reviewed:  [x] YES  [ ] NO  EKG reviewed: [x] YES  [ ] NO    Medications:  Standing  amLODIPine   Tablet 5 milliGRAM(s) Oral daily  atorvastatin 20 milliGRAM(s) Oral at bedtime  chlorhexidine 4% Liquid 1 Application(s) Topical <User Schedule>  dextrose 5%. 1000 milliLiter(s) IV Continuous <Continuous>  dextrose 50% Injectable 12.5 Gram(s) IV Push once  dextrose 50% Injectable 25 Gram(s) IV Push once  dextrose 50% Injectable 25 Gram(s) IV Push once  enoxaparin Injectable 40 milliGRAM(s) SubCutaneous daily  hydrocortisone hemorrhoidal Suppository 1 Suppository(s) Rectal at bedtime  insulin lispro (HumaLOG) corrective regimen sliding scale   SubCutaneous three times a day before meals  losartan 50 milliGRAM(s) Oral daily  magnesium citrate Oral Solution 1 Bottle Oral once  melatonin 5 milliGRAM(s) Oral at bedtime  polyethylene glycol 3350 17 Gram(s) Oral two times a day  senna 2 Tablet(s) Oral at bedtime    PRN Meds  acetaminophen   Tablet .. 650 milliGRAM(s) Oral every 6 hours PRN  ALPRAZolam 0.5 milliGRAM(s) Oral every 6 hours PRN  bisacodyl Suppository 10 milliGRAM(s) Rectal daily PRN  dextrose 40% Gel 15 Gram(s) Oral once PRN  glucagon  Injectable 1 milliGRAM(s) IntraMuscular once PRN      Mendy Shi MD  s. [id number]
GUS WILBURN  68y Female    CHIEF COMPLAINT:    Patient is a 68y old  Female who presents with a chief complaint of hemorrhoidal bleed, chest pain (02 Sep 2019 03:14)      INTERVAL HPI/OVERNIGHT EVENTS:    Patient seen and examined. No acute events overnight. Ambulating around unit    ROS: All other systems are negative.    Vital Signs:    T(F): 97.3 (09-02-19 @ 05:35), Max: 98 (09-01-19 @ 21:04)  HR: 86 (09-02-19 @ 05:35) (86 - 94)  BP: 136/65 (09-02-19 @ 05:35) (113/66 - 136/65)  RR: 20 (09-02-19 @ 05:35) (20 - 20)  01 Sep 2019 07:01  -  02 Sep 2019 07:00  --------------------------------------------------------  IN: 0 mL / OUT: 1700 mL / NET: -1700 mL    POCT Blood Glucose.: 226 mg/dL (02 Sep 2019 11:13)  POCT Blood Glucose.: 222 mg/dL (02 Sep 2019 07:18)  POCT Blood Glucose.: 283 mg/dL (01 Sep 2019 21:22)  POCT Blood Glucose.: 148 mg/dL (01 Sep 2019 16:01)      PHYSICAL EXAM:    GENERAL:  NAD  SKIN: No rashes or lesions  HEENT: Atraumatic. Normocephalic.   NECK: Supple, No JVD. No lymphadenopathy.  PULMONARY: CTA B/L. No wheezing.   CVS: Normal S1, S2. Rate and Rhythm are regular. No murmurs.  ABDOMEN/GI: Soft, Nontender, mildly distended; BS present  MSK:  No edema B/L LE. No clubbing or cyanosis  NEUROLOGIC:  No motor or sensory deficit.  PSYCH: Alert & oriented x 3, normal affect    Consultant(s) Notes Reviewed:  [x ] YES  [ ] NO  Care Discussed with Consultants/Other Providers [ x] YES  [ ] NO    LABS:                        9.3    9.21  )-----------( 380      ( 02 Sep 2019 06:32 )             29.3     09-02    139  |  99  |  22<H>  ----------------------------<  215<H>  5.0   |  27  |  0.9    Ca    10.2<H>      02 Sep 2019 06:32  Mg     2.4     09-02    TPro  7.1  /  Alb  4.1  /  TBili  <0.2  /  DBili  x   /  AST  7   /  ALT  <5  /  AlkPhos  108  09-02    Culture - Urine (08.30.19 @ 07:42)    -  Amikacin: S <=16    -  Ampicillin: R >16 These ampicillin results predict results for amoxicillin    -  Ampicillin/Sulbactam: S <=8/4 Enterobacter, Citrobacter, and Serratia may develop resistance during prolonged therapy (3-4 days)    -  Aztreonam: S <=4    -  Cefazolin: S <=8    -  Cefepime: S <=4    -  Cefoxitin: S <=8    -  Ceftriaxone: S <=1 Enterobacter, Citrobacter, and Serratia may develop resistance during prolonged therapy    -  Ciprofloxacin: S <=1    -  Ertapenem: S <=1    -  Gentamicin: S <=4    -  Imipenem: S <=1    -  Levofloxacin: S <=2    -  Meropenem: S <=1    -  Nitrofurantoin: S <=32 Should not be used to treat pyelonephritis    -  Piperacillin/Tazobactam: S <=16    -  Tigecycline: S <=2    -  Tobramycin: S <=4    -  Trimethoprim/Sulfamethoxazole: S <=2/38    Specimen Source: .Urine Catheterized    Culture Results:   >100,000 CFU/ml Citrobacter koseri    Organism Identification: Citrobacter koseri    Organism: Citrobacter koseri    Method Type: GENARO    RADIOLOGY & ADDITIONAL TESTS:  Imaging or report Personally Reviewed:  [x] YES  [ ] NO  EKG reviewed: [x] YES  [ ] NO    Medications:  Standing  amLODIPine   Tablet 5 milliGRAM(s) Oral daily  atorvastatin 20 milliGRAM(s) Oral at bedtime  cefTRIAXone   IVPB 1000 milliGRAM(s) IV Intermittent every 24 hours  chlorhexidine 4% Liquid 1 Application(s) Topical <User Schedule>  dextrose 5%. 1000 milliLiter(s) IV Continuous <Continuous>  dextrose 50% Injectable 12.5 Gram(s) IV Push once  dextrose 50% Injectable 25 Gram(s) IV Push once  dextrose 50% Injectable 25 Gram(s) IV Push once  enoxaparin Injectable 40 milliGRAM(s) SubCutaneous daily  hydrocortisone hemorrhoidal Suppository 1 Suppository(s) Rectal at bedtime  insulin lispro (HumaLOG) corrective regimen sliding scale   SubCutaneous three times a day before meals  losartan 50 milliGRAM(s) Oral daily  melatonin 5 milliGRAM(s) Oral at bedtime  polyethylene glycol 3350 17 Gram(s) Oral two times a day  senna 2 Tablet(s) Oral at bedtime    PRN Meds  acetaminophen   Tablet .. 650 milliGRAM(s) Oral every 6 hours PRN  ALPRAZolam 0.5 milliGRAM(s) Oral every 6 hours PRN  bisacodyl Suppository 10 milliGRAM(s) Rectal daily PRN  dextrose 40% Gel 15 Gram(s) Oral once PRN  glucagon  Injectable 1 milliGRAM(s) IntraMuscular once PRN      Mendy Shi MD  s. [id number]
GUS WILBURN  68y Female    CHIEF COMPLAINT:    Patient is a 68y old  Female who presents with a chief complaint of hemorrhoidal bleed, chest pain (02 Sep 2019 13:31)      INTERVAL HPI/OVERNIGHT EVENTS:    Patient seen and examined. No acute events overnight. Ambulating around unit    ROS: All other systems are negative.    Vital Signs:    T(F): 97.7 (09-03-19 @ 05:34), Max: 98.2 (09-02-19 @ 21:40)  HR: 80 (09-03-19 @ 05:34) (80 - 97)  BP: 128/64 (09-03-19 @ 05:34) (101/53 - 142/62)  RR: 20 (09-03-19 @ 05:34) (18 - 20)    02 Sep 2019 07:01  -  03 Sep 2019 07:00  --------------------------------------------------------  IN: 0 mL / OUT: 1100 mL / NET: -1100 mL    03 Sep 2019 07:01  -  03 Sep 2019 11:51  --------------------------------------------------------  IN: 0 mL / OUT: 1100 mL / NET: -1100 mL    POCT Blood Glucose.: 157 mg/dL (03 Sep 2019 11:05)  POCT Blood Glucose.: 201 mg/dL (03 Sep 2019 07:25)  POCT Blood Glucose.: 203 mg/dL (02 Sep 2019 21:02)  POCT Blood Glucose.: 167 mg/dL (02 Sep 2019 16:35)      PHYSICAL EXAM:    GENERAL:  NAD  SKIN: No rashes or lesions  HEENT: Atraumatic. Normocephalic.  NECK: Supple, No JVD. No lymphadenopathy.  PULMONARY: CTA B/L. No wheezing. No rales  CVS: Normal S1, S2. Rate and Rhythm are regular.  ABDOMEN/GI: Soft, Nontender, Nondistended; BS present  MSK:  No edema B/L LE. No clubbing or cyanosis  NEUROLOGIC:  No motor or sensory deficit.  PSYCH: Alert & oriented x 3, normal affect    Consultant(s) Notes Reviewed:  [x ] YES  [ ] NO  Care Discussed with Consultants/Other Providers [ x] YES  [ ] NO    LABS:                        8.7    6.14  )-----------( 239      ( 03 Sep 2019 07:16 )             28.1     09-03    140  |  103  |  21<H>  ----------------------------<  192<H>  5.5<H>   |  26  |  0.9    Ca    10.1      03 Sep 2019 07:16  Mg     2.5     09-03    TPro  6.7  /  Alb  3.7  /  TBili  <0.2  /  DBili  x   /  AST  10  /  ALT  <5  /  AlkPhos  102  09-03    Culture - Urine (collected 01 Sep 2019 04:20)  Source: .Urine Catheterized  Preliminary Report (02 Sep 2019 16:57):    >100,000 CFU/ml Gram Negative Rods    >100,000 CFU/ml Gram positive organisms    RADIOLOGY & ADDITIONAL TESTS:  Imaging or report Personally Reviewed:  [x] YES  [ ] NO  EKG reviewed: [x] YES  [ ] NO    Medications:  Standing  amLODIPine   Tablet 5 milliGRAM(s) Oral daily  atorvastatin 20 milliGRAM(s) Oral at bedtime  bisacodyl 10 milliGRAM(s) Oral every 4 hours  chlorhexidine 4% Liquid 1 Application(s) Topical <User Schedule>  ciprofloxacin     Tablet 500 milliGRAM(s) Oral every 12 hours  dextrose 5%. 1000 milliLiter(s) IV Continuous <Continuous>  dextrose 50% Injectable 12.5 Gram(s) IV Push once  dextrose 50% Injectable 25 Gram(s) IV Push once  dextrose 50% Injectable 25 Gram(s) IV Push once  enoxaparin Injectable 40 milliGRAM(s) SubCutaneous daily  hydrocortisone hemorrhoidal Suppository 1 Suppository(s) Rectal at bedtime  insulin lispro (HumaLOG) corrective regimen sliding scale   SubCutaneous three times a day before meals  losartan 50 milliGRAM(s) Oral daily  melatonin 5 milliGRAM(s) Oral at bedtime  polyethylene glycol 3350 17 Gram(s) Oral two times a day  polyethylene glycol/electrolyte Solution. 4000 milliLiter(s) Oral once  senna 2 Tablet(s) Oral at bedtime    PRN Meds  acetaminophen   Tablet .. 650 milliGRAM(s) Oral every 6 hours PRN  ALPRAZolam 0.5 milliGRAM(s) Oral every 6 hours PRN  bisacodyl Suppository 10 milliGRAM(s) Rectal daily PRN  dextrose 40% Gel 15 Gram(s) Oral once PRN  glucagon  Injectable 1 milliGRAM(s) IntraMuscular once PRN      Mendy Shi MD  s. [id number]
GUS WILBURN  68y Female    CHIEF COMPLAINT:    Patient is a 68y old  Female who presents with a chief complaint of hemorrhoidal bleed, chest pain (31 Aug 2019 02:47)      INTERVAL HPI/OVERNIGHT EVENTS:    Patient seen and examined. s/p montejo removal, still retaining urine    ROS: All other systems are negative.    Vital Signs:    T(F): 97.1 (08-31-19 @ 05:51), Max: 99.2 (08-30-19 @ 21:19)  HR: 80 (08-31-19 @ 05:51) (80 - 103)  BP: 126/64 (08-31-19 @ 05:51) (126/64 - 133/64)  RR: 20 (08-31-19 @ 05:51) (18 - 20)  SpO2: 98% (08-31-19 @ 05:51) (97% - 98%)  30 Aug 2019 07:01  -  31 Aug 2019 07:00  --------------------------------------------------------  IN: 0 mL / OUT: 425 mL / NET: -425 mL    31 Aug 2019 07:01  -  31 Aug 2019 10:37  --------------------------------------------------------  IN: 0 mL / OUT: 600 mL / NET: -600 mL    POCT Blood Glucose.: 177 mg/dL (31 Aug 2019 07:29)  POCT Blood Glucose.: 217 mg/dL (30 Aug 2019 21:36)  POCT Blood Glucose.: 320 mg/dL (30 Aug 2019 18:19)  POCT Blood Glucose.: 202 mg/dL (30 Aug 2019 11:16)      PHYSICAL EXAM:    GENERAL:  NAD  SKIN: No rashes or lesions  HEENT: Atraumatic. Normocephalic.   NECK: Supple, No JVD.   PULMONARY: CTA B/L. No wheezing. No rales  CVS: Normal S1, S2. Rate and Rhythm are regular. No murmurs.  ABDOMEN/GI: Soft, Nontender, Nondistended; BS present  MSK:  No edema B/L LE. No clubbing or cyanosis  NEUROLOGIC:  No motor or sensory deficit.  PSYCH: Alert & oriented x 3, normal affect    Consultant(s) Notes Reviewed:  [x ] YES  [ ] NO  Care Discussed with Consultants/Other Providers [ x] YES  [ ] NO    LABS:                        8.8    8.20  )-----------( 321      ( 30 Aug 2019 20:39 )             28.3     08-30    140  |  102  |  21<H>  ----------------------------<  170<H>  4.5   |  24  |  0.8    Ca    10.3<H>      30 Aug 2019 07:16    TPro  6.9  /  Alb  3.9  /  TBili  0.3  /  DBili  x   /  AST  8   /  ALT  <5  /  AlkPhos  100  08-30    PT/INR - ( 29 Aug 2019 15:30 )   PT: 12.60 sec;   INR: 1.10 ratio      PTT - ( 29 Aug 2019 15:30 )  PTT:31.1 sec    Trop <0.01, CKMB --, CK --, 08-30-19 @ 07:16  Trop <0.01, CKMB --, CK --, 08-29-19 @ 15:30    RADIOLOGY & ADDITIONAL TESTS:    Medications:  Standing  amLODIPine   Tablet 5 milliGRAM(s) Oral daily  atorvastatin 20 milliGRAM(s) Oral at bedtime  chlorhexidine 4% Liquid 1 Application(s) Topical <User Schedule>  enoxaparin Injectable 40 milliGRAM(s) SubCutaneous daily  hydrocortisone hemorrhoidal Suppository 1 Suppository(s) Rectal at bedtime  losartan 50 milliGRAM(s) Oral daily  melatonin 5 milliGRAM(s) Oral at bedtime  polyethylene glycol 3350 17 Gram(s) Oral daily  senna 2 Tablet(s) Oral at bedtime    PRN Meds  acetaminophen   Tablet .. 650 milliGRAM(s) Oral every 6 hours PRN      Mendy Shi MD  s. [id number]
HPI  Patient is a 68y old Female who presents with a chief complaint of hemorrhoidal bleed, chest pain (01 Sep 2019 12:04)    Currently admitted to medicine with the primary diagnosis of External hemorrhoids     Today is hospital day 3d.     INTERVAL HPI / OVERNIGHT EVENTS:  Patient was examined and seen at bedside. This morning she is resting comfortably in bed and reports no new issues or overnight events.   Pt failed void trial, now with cathter, output 800cc  Pt having softer bowel movements today    ROS: Otherwise unremarkable     PAST MEDICAL & SURGICAL HISTORY  Female bladder prolapse  Diabetes  History of repair of hip fracture    ALLERGIES  No Known Allergies    MEDICATIONS  STANDING MEDICATIONS  amLODIPine   Tablet 5 milliGRAM(s) Oral daily  atorvastatin 20 milliGRAM(s) Oral at bedtime  chlorhexidine 4% Liquid 1 Application(s) Topical <User Schedule>  dextrose 5%. 1000 milliLiter(s) IV Continuous <Continuous>  dextrose 50% Injectable 12.5 Gram(s) IV Push once  dextrose 50% Injectable 25 Gram(s) IV Push once  dextrose 50% Injectable 25 Gram(s) IV Push once  enoxaparin Injectable 40 milliGRAM(s) SubCutaneous daily  hydrocortisone hemorrhoidal Suppository 1 Suppository(s) Rectal at bedtime  insulin lispro (HumaLOG) corrective regimen sliding scale   SubCutaneous three times a day before meals  losartan 50 milliGRAM(s) Oral daily  magnesium citrate Oral Solution 1 Bottle Oral once  melatonin 5 milliGRAM(s) Oral at bedtime  polyethylene glycol 3350 17 Gram(s) Oral two times a day  senna 2 Tablet(s) Oral at bedtime    PRN MEDICATIONS  acetaminophen   Tablet .. 650 milliGRAM(s) Oral every 6 hours PRN  ALPRAZolam 0.5 milliGRAM(s) Oral every 6 hours PRN  bisacodyl Suppository 10 milliGRAM(s) Rectal daily PRN  dextrose 40% Gel 15 Gram(s) Oral once PRN  glucagon  Injectable 1 milliGRAM(s) IntraMuscular once PRN    VITALS:  T(F): 96  HR: 87  BP: 143/60  RR: 20  SpO2: 97%    PHYSICAL EXAM  GEN: NAD, Resting comfortably in bed  PULM: Clear to auscultation bilaterally, No wheezes  CVS: Regular rate and rhythm, S1-S2, no murmurs  ABD: Soft, non-tender, non-distended, no guarding  EXT: No edema  NEURO: AAOx3, no focal deficits    LABS                        9.4    5.60  )-----------( 379      ( 01 Sep 2019 06:10 )             30.5         143  |  101  |  20  ----------------------------<  152<H>  4.7   |  28  |  0.8    Ca    10.4<H>      01 Sep 2019 06:10  Mg     2.3         TPro  7.4  /  Alb  4.1  /  TBili  0.3  /  DBili  x   /  AST  9   /  ALT  <5  /  AlkPhos  107        Urinalysis Basic - ( 01 Sep 2019 04:20 )    Color: Light Yellow / Appearance: Slightly Turbid / S.009 / pH: x  Gluc: x / Ketone: Negative  / Bili: Negative / Urobili: <2 mg/dL   Blood: x / Protein: 30 mg/dL / Nitrite: Negative   Leuk Esterase: Large / RBC: 1 /HPF /  /HPF   Sq Epi: x / Non Sq Epi: 0 /HPF / Bacteria: Many            Culture - Urine (collected 30 Aug 2019 07:42)  Source: .Urine Catheterized  Preliminary Report (31 Aug 2019 17:35):    >100,000 CFU/ml Gram Negative Rods          RADIOLOGY
HPI  Patient is a 68y old Female who presents with a chief complaint of hemorrhoidal bleed, chest pain (02 Sep 2019 11:37)    Currently admitted to medicine with the primary diagnosis of External hemorrhoids     Today is hospital day 4d.     INTERVAL HPI / OVERNIGHT EVENTS:  Patient was examined and seen at bedside. This morning she is resting comfortably in bed and reports no new issues or overnight events.   Pt switched from IV rocephin to PO cipro tiday  Pt with soft bowel movements today    ROS: Otherwise unremarkable     PAST MEDICAL & SURGICAL HISTORY  Female bladder prolapse  Diabetes  History of repair of hip fracture    ALLERGIES  No Known Allergies    MEDICATIONS  STANDING MEDICATIONS  amLODIPine   Tablet 5 milliGRAM(s) Oral daily  atorvastatin 20 milliGRAM(s) Oral at bedtime  chlorhexidine 4% Liquid 1 Application(s) Topical <User Schedule>  ciprofloxacin     Tablet 500 milliGRAM(s) Oral every 12 hours  dextrose 5%. 1000 milliLiter(s) IV Continuous <Continuous>  dextrose 50% Injectable 12.5 Gram(s) IV Push once  dextrose 50% Injectable 25 Gram(s) IV Push once  dextrose 50% Injectable 25 Gram(s) IV Push once  enoxaparin Injectable 40 milliGRAM(s) SubCutaneous daily  hydrocortisone hemorrhoidal Suppository 1 Suppository(s) Rectal at bedtime  insulin lispro (HumaLOG) corrective regimen sliding scale   SubCutaneous three times a day before meals  losartan 50 milliGRAM(s) Oral daily  magnesium citrate Oral Solution 1 Bottle Oral once  melatonin 5 milliGRAM(s) Oral at bedtime  polyethylene glycol 3350 17 Gram(s) Oral two times a day  senna 2 Tablet(s) Oral at bedtime    PRN MEDICATIONS  acetaminophen   Tablet .. 650 milliGRAM(s) Oral every 6 hours PRN  ALPRAZolam 0.5 milliGRAM(s) Oral every 6 hours PRN  bisacodyl Suppository 10 milliGRAM(s) Rectal daily PRN  dextrose 40% Gel 15 Gram(s) Oral once PRN  glucagon  Injectable 1 milliGRAM(s) IntraMuscular once PRN    VITALS:  T(F): 97.4  HR: 97  BP: 101/53  RR: 20  SpO2: --    PHYSICAL EXAM  GEN: NAD, Resting comfortably in bed  PULM: Clear to auscultation bilaterally, No wheezes  CVS: Regular rate and rhythm, S1-S2, no murmurs  ABD: Soft, non-tender, non-distended, no guarding  EXT: No edema  NEURO: AAOx3, no focal deficits    LABS                        9.3    9.21  )-----------( 380      ( 02 Sep 2019 06:32 )             29.3         139  |  99  |  22<H>  ----------------------------<  215<H>  5.0   |  27  |  0.9    Ca    10.2<H>      02 Sep 2019 06:32  Mg     2.4         TPro  7.1  /  Alb  4.1  /  TBili  <0.2  /  DBili  x   /  AST  7   /  ALT  <5  /  AlkPhos  108        Urinalysis Basic - ( 01 Sep 2019 04:20 )    Color: Light Yellow / Appearance: Slightly Turbid / S.009 / pH: x  Gluc: x / Ketone: Negative  / Bili: Negative / Urobili: <2 mg/dL   Blood: x / Protein: 30 mg/dL / Nitrite: Negative   Leuk Esterase: Large / RBC: 1 /HPF /  /HPF   Sq Epi: x / Non Sq Epi: 0 /HPF / Bacteria: Many                RADIOLOGY
HPI  Patient is a 68y old Female who presents with a chief complaint of hemorrhoidal bleed, chest pain (30 Aug 2019 08:53)    Currently admitted to medicine with the primary diagnosis of External hemorrhoids     Today is hospital day 1d.     INTERVAL HPI / OVERNIGHT EVENTS:  Patient was examined and seen at bedside. This morning she is resting comfortably in bed and reports no new issues or overnight events.   Pt with major relief after disimpaction. Pt with no complaints at this time.    ROS: Otherwise unremarkable     PAST MEDICAL & SURGICAL HISTORY  Female bladder prolapse  Diabetes  History of repair of hip fracture    ALLERGIES  No Known Allergies    MEDICATIONS  STANDING MEDICATIONS  amLODIPine   Tablet 5 milliGRAM(s) Oral daily  atorvastatin 20 milliGRAM(s) Oral at bedtime  chlorhexidine 4% Liquid 1 Application(s) Topical <User Schedule>  enoxaparin Injectable 40 milliGRAM(s) SubCutaneous daily  losartan 50 milliGRAM(s) Oral daily  magnesium citrate Oral Solution 1 Bottle Oral once  melatonin 5 milliGRAM(s) Oral at bedtime  polyethylene glycol 3350 17 Gram(s) Oral daily  saline laxative (FLEET) Rectal Enema 1 Enema Rectal Once  senna 2 Tablet(s) Oral at bedtime    PRN MEDICATIONS  acetaminophen   Tablet .. 650 milliGRAM(s) Oral every 6 hours PRN    VITALS:  T(F): 98.4  HR: 94  BP: 133/64  RR: 18  SpO2: 99%    PHYSICAL EXAM  GEN: NAD, Resting comfortably in bed  PULM: Clear to auscultation bilaterally, No wheezes  CVS: Regular rate and rhythm, S1-S2, no murmurs  ABD: Soft, non-tender, non-distended, no guarding  EXT: No edema  NEURO: AAOx3, no focal deficits    LABS                        9.6    8.31  )-----------( 337      ( 30 Aug 2019 07:16 )             30.3     08-30    140  |  102  |  21<H>  ----------------------------<  170<H>  4.5   |  24  |  0.8    Ca    10.3<H>      30 Aug 2019 07:16    TPro  6.9  /  Alb  3.9  /  TBili  0.3  /  DBili  x   /  AST  8   /  ALT  <5  /  AlkPhos  100  08-30    PT/INR - ( 29 Aug 2019 15:30 )   PT: 12.60 sec;   INR: 1.10 ratio         PTT - ( 29 Aug 2019 15:30 )  PTT:31.1 sec  Urinalysis Basic - ( 30 Aug 2019 07:42 )    Color: Yellow / Appearance: Slightly Turbid / S.023 / pH: x  Gluc: x / Ketone: Negative  / Bili: Negative / Urobili: <2 mg/dL   Blood: x / Protein: 100 mg/dL / Nitrite: Negative   Leuk Esterase: Large / RBC: 1 /HPF /  /HPF   Sq Epi: x / Non Sq Epi: 0 /HPF / Bacteria: Many        Troponin T, Serum: <0.01 ng/mL (19 @ 07:16)  Troponin T, Serum: <0.01 ng/mL (19 @ 15:30)      CARDIAC MARKERS ( 30 Aug 2019 07:16 )  x     / <0.01 ng/mL / x     / x     / x      CARDIAC MARKERS ( 29 Aug 2019 15:30 )  x     / <0.01 ng/mL / x     / x     / x          RADIOLOGY
Patient is a 68y old  Female who presents with a chief complaint of hemorrhoidal bleed, chest pain (04 Sep 2019 12:24)      SUBJECTIVE / OVERNIGHT EVENTS:  no cp, sob, abd pain, fever  no abd pain, melena, brbpr, nausea    MEDICATIONS  (STANDING):  amLODIPine   Tablet 5 milliGRAM(s) Oral daily  atorvastatin 20 milliGRAM(s) Oral at bedtime  chlorhexidine 4% Liquid 1 Application(s) Topical <User Schedule>  ciprofloxacin     Tablet 500 milliGRAM(s) Oral every 12 hours  dextrose 5%. 1000 milliLiter(s) (50 mL/Hr) IV Continuous <Continuous>  dextrose 50% Injectable 12.5 Gram(s) IV Push once  dextrose 50% Injectable 25 Gram(s) IV Push once  dextrose 50% Injectable 25 Gram(s) IV Push once  enoxaparin Injectable 40 milliGRAM(s) SubCutaneous daily  hydrocortisone hemorrhoidal Suppository 1 Suppository(s) Rectal at bedtime  insulin lispro (HumaLOG) corrective regimen sliding scale   SubCutaneous three times a day before meals  melatonin 5 milliGRAM(s) Oral at bedtime  polyethylene glycol 3350 17 Gram(s) Oral two times a day  senna 2 Tablet(s) Oral at bedtime    MEDICATIONS  (PRN):  acetaminophen   Tablet .. 650 milliGRAM(s) Oral every 6 hours PRN Moderate Pain (4 - 6)  ALPRAZolam 0.5 milliGRAM(s) Oral every 6 hours PRN anxiety  bisacodyl Suppository 10 milliGRAM(s) Rectal daily PRN Constipation  dextrose 40% Gel 15 Gram(s) Oral once PRN Blood Glucose LESS THAN 70 milliGRAM(s)/deciliter  glucagon  Injectable 1 milliGRAM(s) IntraMuscular once PRN Glucose LESS THAN 70 milligrams/deciliter        CAPILLARY BLOOD GLUCOSE      POCT Blood Glucose.: 144 mg/dL (04 Sep 2019 11:25)  POCT Blood Glucose.: 148 mg/dL (04 Sep 2019 07:21)  POCT Blood Glucose.: 105 mg/dL (03 Sep 2019 19:36)  POCT Blood Glucose.: 108 mg/dL (03 Sep 2019 15:29)    I&O's Summary    03 Sep 2019 07:01  -  04 Sep 2019 07:00  --------------------------------------------------------  IN: 0 mL / OUT: 2450 mL / NET: -2450 mL        PHYSICAL EXAM:  GENERAL: nad, a+o x3  EYES:  NECK:   CHEST/LUNG: ctab no w/r/r  HEART: rrr no m/g/r  ABDOMEN: soft nt nd  EXTREMITIES:  no edema bl  PSYCH:   NEUROLOGY:   SKIN:    LABS:                        8.9    5.35  )-----------( 321      ( 04 Sep 2019 06:58 )             28.6     09-04    143  |  104  |  13  ----------------------------<  142<H>  4.4   |  26  |  0.7    Ca    9.9      04 Sep 2019 06:58  Mg     1.9     09-04    TPro  6.3  /  Alb  3.6  /  TBili  0.2  /  DBili  x   /  AST  9   /  ALT  <5  /  AlkPhos  93  09-04    PT/INR - ( 04 Sep 2019 06:58 )   PT: 12.40 sec;   INR: 1.08 ratio         PTT - ( 04 Sep 2019 06:58 )  PTT:32.5 sec          RADIOLOGY & ADDITIONAL TESTS:    Imaging Personally Reviewed:  Yes    Consultant(s) Notes Reviewed:      Care Discussed with Consultants/Other Providers:    Assessment and Plan   PAST MEDICAL & SURGICAL HISTORY:  Female bladder prolapse  Diabetes  No pertinent past medical history  History of repair of hip fracture
Patient is awake and alert VSS

## 2019-09-04 NOTE — DISCHARGE NOTE PROVIDER - CARE PROVIDER_API CALL
Carmencita Molina)  [facility]  Physicians  [address] [place] [address]  Phone: [phone]  Fax: [phone]  Follow Up Time:     Dr. Solis,   Gastroenterology  Phone: (   )    -  Fax: (   )    -  Follow Up Time:     PCP,   Phone: (   )    -  Fax: (   )    -  Follow Up Time:

## 2019-09-04 NOTE — PROGRESS NOTE ADULT - REASON FOR ADMISSION
hemorrhoidal bleed, chest pain

## 2019-09-04 NOTE — PROGRESS NOTE ADULT - PROVIDER SPECIALTY LIST ADULT
Anesthesia
Internal Medicine
Surgery
Internal Medicine

## 2019-09-04 NOTE — PROGRESS NOTE ADULT - ASSESSMENT
68F PMHx DM2, recent labial abscess s/p I&D, moderate AS, hip fx s/p repair here with LGIB in setting of opioid-induced constipation requiring disimpaction. Ecoli and enterococcus uti.    #LGIB, now resolved; in setting of opioid induced constipation, hemorrhoids  h/h stable  colonoscopy today  f/u gi recs  discharge planning  #UTI, ecoli and enterococcus  cipro plan for seven days total  # 68F PMHx DM2, recent labial abscess s/p I&D, HTN, moderate AS, L hip fx s/p repair here with LGIB in setting of opioid-induced constipation requiring disimpaction. Ecoli and enterococcus uti.    #LGIB, now resolved; in setting of opioid induced constipation, hemorrhoids  h/h stable  colonoscopy today  f/u gi recs  discharge planning  anusol, miralax bid  #UTI, ecoli and enterococcus  cipro plan for seven days total  #Urinary retention  failed TOV  to be d/c home with montejo, f/u uro outpt  #DM2  ssi  #HTN  norvasc 5  #DVT ppx  lovenox      #Progress Note Handoff:  Pending (specify):  Consults_________, Tests________, Test Results_______, Other____colonoscopy_____  Family discussion:d/w pt at bedside  Disposition: Home__x_/SNF___/Other________/Unknown at this time________

## 2019-09-04 NOTE — PRE-ANESTHESIA EVALUATION ADULT - NSANTHASARD_GEN_ALL_CORE
Patient called back and would like the refill to go to [facility] in [place]. [facility] in [place] wants $1,400. [facility] is only $46.00.     Thank you.   3

## 2019-09-04 NOTE — DISCHARGE NOTE PROVIDER - HOSPITAL COURSE
68F with PMHx of uncontrolled DM (non-compliant), viral myopathy, recent labial abscess s/p ID and recent admission for fall and hip fracture s/p fixation presents for LGIB bleed. Patient had a fall and surgical repair of hip fracture in early August, after which she occasionally used opioids for pain management had had decreased mobility. Approximately 2 weeks ago she became constipated and has been straining to have bowel movements. Not long after this became aware of  reducible, non-painful hemorrhoids. This morning she noticed bright red blood in the toilet bowl, and then on the way to the ED she states she was "soaked" in bright red. She has never had a colonoscopy.    Of note, patient also c/o sharp chest pain which lasted for a few seconds, not related to exertion, is related to anxiety and states it resolves on its own or with Xanax. Patient had recent echo which revealed moderate AS, was planning for outpatient stress test.     In ED, patient was disimpacted and had large bowel movement. On arrival, afebrile, VSS, hemoglobin greater than previous admissions. CTA AP negative for bleed, however it did show distended bladder. Upon further questioning, patient has had decreased urination and per daughter, bladder prolapse for many years.     Since admission patient has had soft stools. She has failed void trial and will leave with indwelling montejo. She has an appointment with uro/gyn tomorrow and will follow up colonoscopy with Dr. Solis outpatient. 68F with PMHx of uncontrolled DM (non-compliant), viral myopathy, recent labial abscess s/p ID and recent admission for fall and hip fracture s/p fixation presents for LGIB bleed. Patient had a fall and surgical repair of hip fracture in early August, after which she occasionally used opioids for pain management had had decreased mobility. Approximately 2 weeks ago she became constipated and has been straining to have bowel movements. Not long after this became aware of  reducible, non-painful hemorrhoids. This morning she noticed bright red blood in the toilet bowl, and then on the way to the ED she states she was "soaked" in bright red. She has never had a colonoscopy.    Of note, patient also c/o sharp chest pain which lasted for a few seconds, not related to exertion, is related to anxiety and states it resolves on its own or with Xanax. Patient had recent echo which revealed moderate AS, was planning for outpatient stress test.     In ED, patient was disimpacted and had large bowel movement. On arrival, afebrile, VSS, hemoglobin greater than previous admissions. CTA AP negative for bleed, however it did show distended bladder. Upon further questioning, patient has had decreased urination and per daughter, bladder prolapse for many years.     Since admission patient has had soft stools. She has failed void trial and will leave with indwelling montejo. She has an appointment with uro/gyn tomorrow and will follow up colonoscopy with Dr. Solis outpatient.        31 minutes spent on discharge planning

## 2019-09-05 ENCOUNTER — APPOINTMENT (OUTPATIENT)
Dept: UROGYNECOLOGY | Facility: CLINIC | Age: 69
End: 2019-09-05

## 2019-09-05 PROBLEM — Z00.00 ENCOUNTER FOR PREVENTIVE HEALTH EXAMINATION: Status: ACTIVE | Noted: 2019-09-05

## 2019-09-06 DIAGNOSIS — K64.0 FIRST DEGREE HEMORRHOIDS: ICD-10-CM

## 2019-09-06 DIAGNOSIS — N81.10 CYSTOCELE, UNSPECIFIED: ICD-10-CM

## 2019-09-06 DIAGNOSIS — I10 ESSENTIAL (PRIMARY) HYPERTENSION: ICD-10-CM

## 2019-09-06 DIAGNOSIS — F41.9 ANXIETY DISORDER, UNSPECIFIED: ICD-10-CM

## 2019-09-06 DIAGNOSIS — E87.5 HYPERKALEMIA: ICD-10-CM

## 2019-09-06 DIAGNOSIS — K59.03 DRUG INDUCED CONSTIPATION: ICD-10-CM

## 2019-09-06 DIAGNOSIS — K59.00 CONSTIPATION, UNSPECIFIED: ICD-10-CM

## 2019-09-06 DIAGNOSIS — E11.9 TYPE 2 DIABETES MELLITUS WITHOUT COMPLICATIONS: ICD-10-CM

## 2019-09-06 DIAGNOSIS — R33.9 RETENTION OF URINE, UNSPECIFIED: ICD-10-CM

## 2019-09-06 DIAGNOSIS — N39.0 URINARY TRACT INFECTION, SITE NOT SPECIFIED: ICD-10-CM

## 2019-09-06 DIAGNOSIS — T40.2X5A ADVERSE EFFECT OF OTHER OPIOIDS, INITIAL ENCOUNTER: ICD-10-CM

## 2019-09-09 LAB — SURGICAL PATHOLOGY STUDY: SIGNIFICANT CHANGE UP

## 2019-09-13 ENCOUNTER — APPOINTMENT (OUTPATIENT)
Dept: UROGYNECOLOGY | Facility: CLINIC | Age: 69
End: 2019-09-13
Payer: OTHER MISCELLANEOUS

## 2019-09-13 ENCOUNTER — OUTPATIENT (OUTPATIENT)
Dept: OUTPATIENT SERVICES | Facility: HOSPITAL | Age: 69
LOS: 1 days | Discharge: HOME | End: 2019-09-13

## 2019-09-13 VITALS
DIASTOLIC BLOOD PRESSURE: 82 MMHG | HEIGHT: 63 IN | WEIGHT: 128 LBS | BODY MASS INDEX: 22.68 KG/M2 | SYSTOLIC BLOOD PRESSURE: 130 MMHG

## 2019-09-13 DIAGNOSIS — Z98.890 OTHER SPECIFIED POSTPROCEDURAL STATES: Chronic | ICD-10-CM

## 2019-09-13 DIAGNOSIS — Z80.9 FAMILY HISTORY OF MALIGNANT NEOPLASM, UNSPECIFIED: ICD-10-CM

## 2019-09-13 DIAGNOSIS — Z78.9 OTHER SPECIFIED HEALTH STATUS: ICD-10-CM

## 2019-09-13 DIAGNOSIS — Z87.891 PERSONAL HISTORY OF NICOTINE DEPENDENCE: ICD-10-CM

## 2019-09-13 PROCEDURE — 99354: CPT | Mod: 25

## 2019-09-13 PROCEDURE — 51700 IRRIGATION OF BLADDER: CPT

## 2019-09-13 PROCEDURE — 57160 INSERT PESSARY/OTHER DEVICE: CPT

## 2019-09-13 PROCEDURE — 99205 OFFICE O/P NEW HI 60 MIN: CPT | Mod: 25

## 2019-09-16 DIAGNOSIS — N81.3 COMPLETE UTEROVAGINAL PROLAPSE: ICD-10-CM

## 2019-09-16 DIAGNOSIS — R33.9 RETENTION OF URINE, UNSPECIFIED: ICD-10-CM

## 2019-09-20 ENCOUNTER — APPOINTMENT (OUTPATIENT)
Dept: UROGYNECOLOGY | Facility: CLINIC | Age: 69
End: 2019-09-20
Payer: OTHER MISCELLANEOUS

## 2019-09-20 ENCOUNTER — OUTPATIENT (OUTPATIENT)
Dept: OUTPATIENT SERVICES | Facility: HOSPITAL | Age: 69
LOS: 1 days | Discharge: HOME | End: 2019-09-20

## 2019-09-20 VITALS — SYSTOLIC BLOOD PRESSURE: 120 MMHG | DIASTOLIC BLOOD PRESSURE: 68 MMHG

## 2019-09-20 DIAGNOSIS — Z98.890 OTHER SPECIFIED POSTPROCEDURAL STATES: Chronic | ICD-10-CM

## 2019-09-20 PROCEDURE — 51784 ANAL/URINARY MUSCLE STUDY: CPT | Mod: 26

## 2019-09-20 PROCEDURE — A4344: CPT

## 2019-09-20 PROCEDURE — 99214 OFFICE O/P EST MOD 30 MIN: CPT | Mod: 25

## 2019-09-20 PROCEDURE — 51726 COMPLEX CYSTOMETROGRAM: CPT | Mod: 26

## 2019-09-21 ENCOUNTER — INPATIENT (INPATIENT)
Facility: HOSPITAL | Age: 69
LOS: 26 days | Discharge: HOME HEALTH | End: 2019-10-18
Attending: THORACIC SURGERY (CARDIOTHORACIC VASCULAR SURGERY) | Admitting: THORACIC SURGERY (CARDIOTHORACIC VASCULAR SURGERY)
Payer: MEDICARE

## 2019-09-21 VITALS
DIASTOLIC BLOOD PRESSURE: 65 MMHG | SYSTOLIC BLOOD PRESSURE: 104 MMHG | RESPIRATION RATE: 18 BRPM | TEMPERATURE: 98 F | HEART RATE: 110 BPM | OXYGEN SATURATION: 97 %

## 2019-09-21 DIAGNOSIS — Z98.890 OTHER SPECIFIED POSTPROCEDURAL STATES: Chronic | ICD-10-CM

## 2019-09-21 LAB
ALBUMIN SERPL ELPH-MCNC: 3.8 G/DL — SIGNIFICANT CHANGE UP (ref 3.5–5.2)
ALP SERPL-CCNC: 222 U/L — HIGH (ref 30–115)
ALT FLD-CCNC: 146 U/L — HIGH (ref 0–41)
ANION GAP SERPL CALC-SCNC: 18 MMOL/L — HIGH (ref 7–14)
APPEARANCE UR: ABNORMAL
APTT BLD: 32.1 SEC — SIGNIFICANT CHANGE UP (ref 27–39.2)
AST SERPL-CCNC: 161 U/L — HIGH (ref 0–41)
BACTERIA # UR AUTO: ABNORMAL
BASE EXCESS BLDV CALC-SCNC: -7.5 MMOL/L — LOW (ref -2–2)
BASOPHILS # BLD AUTO: 0.03 K/UL — SIGNIFICANT CHANGE UP (ref 0–0.2)
BASOPHILS NFR BLD AUTO: 0.4 % — SIGNIFICANT CHANGE UP (ref 0–1)
BILIRUB SERPL-MCNC: 0.4 MG/DL — SIGNIFICANT CHANGE UP (ref 0.2–1.2)
BILIRUB UR-MCNC: NEGATIVE — SIGNIFICANT CHANGE UP
BUN SERPL-MCNC: 58 MG/DL — HIGH (ref 10–20)
CA-I SERPL-SCNC: 1.33 MMOL/L — HIGH (ref 1.12–1.3)
CALCIUM SERPL-MCNC: 10 MG/DL — SIGNIFICANT CHANGE UP (ref 8.5–10.1)
CHLORIDE SERPL-SCNC: 102 MMOL/L — SIGNIFICANT CHANGE UP (ref 98–110)
CO2 SERPL-SCNC: 11 MMOL/L — LOW (ref 17–32)
COLOR SPEC: YELLOW — SIGNIFICANT CHANGE UP
CREAT SERPL-MCNC: 1.2 MG/DL — SIGNIFICANT CHANGE UP (ref 0.7–1.5)
D DIMER BLD IA.RAPID-MCNC: 513 NG/ML DDU — HIGH (ref 0–230)
DIFF PNL FLD: ABNORMAL
EOSINOPHIL # BLD AUTO: 0.05 K/UL — SIGNIFICANT CHANGE UP (ref 0–0.7)
EOSINOPHIL NFR BLD AUTO: 0.6 % — SIGNIFICANT CHANGE UP (ref 0–8)
EPI CELLS # UR: 1 /HPF — SIGNIFICANT CHANGE UP (ref 0–5)
GAS PNL BLDV: 133 MMOL/L — LOW (ref 136–145)
GAS PNL BLDV: SIGNIFICANT CHANGE UP
GLUCOSE BLDC GLUCOMTR-MCNC: 349 MG/DL — HIGH (ref 70–99)
GLUCOSE SERPL-MCNC: 349 MG/DL — HIGH (ref 70–99)
GLUCOSE UR QL: ABNORMAL
HCO3 BLDV-SCNC: 19 MMOL/L — LOW (ref 22–29)
HCT VFR BLD CALC: 31.2 % — LOW (ref 37–47)
HCT VFR BLDA CALC: 31.2 % — LOW (ref 34–44)
HGB BLD CALC-MCNC: 10.2 G/DL — LOW (ref 14–18)
HGB BLD-MCNC: 9.8 G/DL — LOW (ref 12–16)
HYALINE CASTS # UR AUTO: 6 /LPF — SIGNIFICANT CHANGE UP (ref 0–7)
IMM GRANULOCYTES NFR BLD AUTO: 0.5 % — HIGH (ref 0.1–0.3)
INR BLD: 1.04 RATIO — SIGNIFICANT CHANGE UP (ref 0.65–1.3)
KETONES UR-MCNC: NEGATIVE — SIGNIFICANT CHANGE UP
LACTATE BLDV-MCNC: 3.4 MMOL/L — HIGH (ref 0.5–1.6)
LEUKOCYTE ESTERASE UR-ACNC: ABNORMAL
LIDOCAIN IGE QN: 153 U/L — HIGH (ref 7–60)
LYMPHOCYTES # BLD AUTO: 1.47 K/UL — SIGNIFICANT CHANGE UP (ref 1.2–3.4)
LYMPHOCYTES # BLD AUTO: 17.8 % — LOW (ref 20.5–51.1)
MAGNESIUM SERPL-MCNC: 2.2 MG/DL — SIGNIFICANT CHANGE UP (ref 1.8–2.4)
MCHC RBC-ENTMCNC: 26.2 PG — LOW (ref 27–31)
MCHC RBC-ENTMCNC: 31.4 G/DL — LOW (ref 32–37)
MCV RBC AUTO: 83.4 FL — SIGNIFICANT CHANGE UP (ref 81–99)
MONOCYTES # BLD AUTO: 0.51 K/UL — SIGNIFICANT CHANGE UP (ref 0.1–0.6)
MONOCYTES NFR BLD AUTO: 6.2 % — SIGNIFICANT CHANGE UP (ref 1.7–9.3)
NEUTROPHILS # BLD AUTO: 6.17 K/UL — SIGNIFICANT CHANGE UP (ref 1.4–6.5)
NEUTROPHILS NFR BLD AUTO: 74.5 % — SIGNIFICANT CHANGE UP (ref 42.2–75.2)
NITRITE UR-MCNC: NEGATIVE — SIGNIFICANT CHANGE UP
NRBC # BLD: 0 /100 WBCS — SIGNIFICANT CHANGE UP (ref 0–0)
NT-PROBNP SERPL-SCNC: HIGH PG/ML (ref 0–300)
PCO2 BLDV: 42 MMHG — SIGNIFICANT CHANGE UP (ref 41–51)
PH BLDV: 7.27 — SIGNIFICANT CHANGE UP (ref 7.26–7.43)
PH UR: 6 — SIGNIFICANT CHANGE UP (ref 5–8)
PLATELET # BLD AUTO: 311 K/UL — SIGNIFICANT CHANGE UP (ref 130–400)
PO2 BLDV: 25 MMHG — SIGNIFICANT CHANGE UP (ref 20–40)
POTASSIUM BLDV-SCNC: 5.2 MMOL/L — SIGNIFICANT CHANGE UP (ref 3.3–5.6)
POTASSIUM SERPL-MCNC: 6.5 MMOL/L — CRITICAL HIGH (ref 3.5–5)
POTASSIUM SERPL-SCNC: 6.5 MMOL/L — CRITICAL HIGH (ref 3.5–5)
PROT SERPL-MCNC: 7 G/DL — SIGNIFICANT CHANGE UP (ref 6–8)
PROT UR-MCNC: ABNORMAL
PROTHROM AB SERPL-ACNC: 12 SEC — SIGNIFICANT CHANGE UP (ref 9.95–12.87)
RBC # BLD: 3.74 M/UL — LOW (ref 4.2–5.4)
RBC # FLD: 14 % — SIGNIFICANT CHANGE UP (ref 11.5–14.5)
RBC CASTS # UR COMP ASSIST: 14 /HPF — HIGH (ref 0–4)
SAO2 % BLDV: 33 % — SIGNIFICANT CHANGE UP
SODIUM SERPL-SCNC: 131 MMOL/L — LOW (ref 135–146)
SP GR SPEC: 1.02 — SIGNIFICANT CHANGE UP (ref 1.01–1.02)
TROPONIN T SERPL-MCNC: 0.43 NG/ML — CRITICAL HIGH
UROBILINOGEN FLD QL: SIGNIFICANT CHANGE UP
WBC # BLD: 8.27 K/UL — SIGNIFICANT CHANGE UP (ref 4.8–10.8)
WBC # FLD AUTO: 8.27 K/UL — SIGNIFICANT CHANGE UP (ref 4.8–10.8)
WBC UR QL: 78 /HPF — HIGH (ref 0–5)

## 2019-09-21 PROCEDURE — 99291 CRITICAL CARE FIRST HOUR: CPT

## 2019-09-21 PROCEDURE — 71275 CT ANGIOGRAPHY CHEST: CPT | Mod: 26

## 2019-09-21 PROCEDURE — 93010 ELECTROCARDIOGRAM REPORT: CPT

## 2019-09-21 PROCEDURE — 93970 EXTREMITY STUDY: CPT | Mod: 26

## 2019-09-21 PROCEDURE — 71045 X-RAY EXAM CHEST 1 VIEW: CPT | Mod: 26

## 2019-09-21 RX ORDER — DEXTROSE 50 % IN WATER 50 %
25 SYRINGE (ML) INTRAVENOUS ONCE
Refills: 0 | Status: DISCONTINUED | OUTPATIENT
Start: 2019-09-21 | End: 2019-10-07

## 2019-09-21 RX ORDER — FUROSEMIDE 40 MG
20 TABLET ORAL ONCE
Refills: 0 | Status: COMPLETED | OUTPATIENT
Start: 2019-09-21 | End: 2019-09-21

## 2019-09-21 RX ORDER — CLOPIDOGREL BISULFATE 75 MG/1
300 TABLET, FILM COATED ORAL ONCE
Refills: 0 | Status: COMPLETED | OUTPATIENT
Start: 2019-09-21 | End: 2019-09-21

## 2019-09-21 RX ORDER — CHLORHEXIDINE GLUCONATE 213 G/1000ML
1 SOLUTION TOPICAL
Refills: 0 | Status: DISCONTINUED | OUTPATIENT
Start: 2019-09-21 | End: 2019-10-07

## 2019-09-21 RX ORDER — DEXTROSE 50 % IN WATER 50 %
12.5 SYRINGE (ML) INTRAVENOUS ONCE
Refills: 0 | Status: DISCONTINUED | OUTPATIENT
Start: 2019-09-21 | End: 2019-10-07

## 2019-09-21 RX ORDER — ASPIRIN/CALCIUM CARB/MAGNESIUM 324 MG
325 TABLET ORAL ONCE
Refills: 0 | Status: COMPLETED | OUTPATIENT
Start: 2019-09-21 | End: 2019-09-21

## 2019-09-21 RX ORDER — HEPARIN SODIUM 5000 [USP'U]/ML
INJECTION INTRAVENOUS; SUBCUTANEOUS
Qty: 25000 | Refills: 0 | Status: DISCONTINUED | OUTPATIENT
Start: 2019-09-21 | End: 2019-09-22

## 2019-09-21 RX ORDER — ASPIRIN/CALCIUM CARB/MAGNESIUM 324 MG
81 TABLET ORAL DAILY
Refills: 0 | Status: DISCONTINUED | OUTPATIENT
Start: 2019-09-22 | End: 2019-10-07

## 2019-09-21 RX ORDER — GLUCAGON INJECTION, SOLUTION 0.5 MG/.1ML
1 INJECTION, SOLUTION SUBCUTANEOUS ONCE
Refills: 0 | Status: DISCONTINUED | OUTPATIENT
Start: 2019-09-21 | End: 2019-10-07

## 2019-09-21 RX ORDER — SODIUM CHLORIDE 9 MG/ML
1000 INJECTION INTRAMUSCULAR; INTRAVENOUS; SUBCUTANEOUS
Refills: 0 | Status: DISCONTINUED | OUTPATIENT
Start: 2019-09-21 | End: 2019-09-21

## 2019-09-21 RX ORDER — CLOPIDOGREL BISULFATE 75 MG/1
75 TABLET, FILM COATED ORAL DAILY
Refills: 0 | Status: DISCONTINUED | OUTPATIENT
Start: 2019-09-22 | End: 2019-09-23

## 2019-09-21 RX ORDER — FUROSEMIDE 40 MG
40 TABLET ORAL ONCE
Refills: 0 | Status: DISCONTINUED | OUTPATIENT
Start: 2019-09-21 | End: 2019-09-21

## 2019-09-21 RX ORDER — HEPARIN SODIUM 5000 [USP'U]/ML
3500 INJECTION INTRAVENOUS; SUBCUTANEOUS EVERY 6 HOURS
Refills: 0 | Status: DISCONTINUED | OUTPATIENT
Start: 2019-09-21 | End: 2019-09-22

## 2019-09-21 RX ORDER — HEPARIN SODIUM 5000 [USP'U]/ML
3500 INJECTION INTRAVENOUS; SUBCUTANEOUS ONCE
Refills: 0 | Status: COMPLETED | OUTPATIENT
Start: 2019-09-21 | End: 2019-09-21

## 2019-09-21 RX ORDER — MORPHINE SULFATE 50 MG/1
2 CAPSULE, EXTENDED RELEASE ORAL EVERY 6 HOURS
Refills: 0 | Status: DISCONTINUED | OUTPATIENT
Start: 2019-09-21 | End: 2019-09-28

## 2019-09-21 RX ORDER — FUROSEMIDE 40 MG
40 TABLET ORAL DAILY
Refills: 0 | Status: DISCONTINUED | OUTPATIENT
Start: 2019-09-21 | End: 2019-09-22

## 2019-09-21 RX ORDER — DEXTROSE 50 % IN WATER 50 %
15 SYRINGE (ML) INTRAVENOUS ONCE
Refills: 0 | Status: DISCONTINUED | OUTPATIENT
Start: 2019-09-21 | End: 2019-10-07

## 2019-09-21 RX ORDER — INSULIN GLARGINE 100 [IU]/ML
7 INJECTION, SOLUTION SUBCUTANEOUS AT BEDTIME
Refills: 0 | Status: DISCONTINUED | OUTPATIENT
Start: 2019-09-21 | End: 2019-09-22

## 2019-09-21 RX ORDER — SODIUM CHLORIDE 9 MG/ML
1000 INJECTION, SOLUTION INTRAVENOUS
Refills: 0 | Status: DISCONTINUED | OUTPATIENT
Start: 2019-09-21 | End: 2019-10-07

## 2019-09-21 RX ORDER — CEFTRIAXONE 500 MG/1
1000 INJECTION, POWDER, FOR SOLUTION INTRAMUSCULAR; INTRAVENOUS ONCE
Refills: 0 | Status: COMPLETED | OUTPATIENT
Start: 2019-09-21 | End: 2019-09-21

## 2019-09-21 RX ORDER — ATORVASTATIN CALCIUM 80 MG/1
80 TABLET, FILM COATED ORAL DAILY
Refills: 0 | Status: DISCONTINUED | OUTPATIENT
Start: 2019-09-21 | End: 2019-10-07

## 2019-09-21 RX ORDER — INSULIN LISPRO 100/ML
VIAL (ML) SUBCUTANEOUS EVERY 6 HOURS
Refills: 0 | Status: DISCONTINUED | OUTPATIENT
Start: 2019-09-21 | End: 2019-09-22

## 2019-09-21 RX ADMIN — Medication 40 MILLIGRAM(S): at 23:48

## 2019-09-21 RX ADMIN — CEFTRIAXONE 100 MILLIGRAM(S): 500 INJECTION, POWDER, FOR SOLUTION INTRAMUSCULAR; INTRAVENOUS at 19:53

## 2019-09-21 RX ADMIN — CLOPIDOGREL BISULFATE 300 MILLIGRAM(S): 75 TABLET, FILM COATED ORAL at 22:08

## 2019-09-21 RX ADMIN — Medication 0.5 MILLIGRAM(S): at 18:53

## 2019-09-21 RX ADMIN — SODIUM CHLORIDE 100 MILLILITER(S): 9 INJECTION INTRAMUSCULAR; INTRAVENOUS; SUBCUTANEOUS at 17:27

## 2019-09-21 RX ADMIN — HEPARIN SODIUM 700 UNIT(S)/HR: 5000 INJECTION INTRAVENOUS; SUBCUTANEOUS at 21:21

## 2019-09-21 RX ADMIN — Medication 20 MILLIGRAM(S): at 20:51

## 2019-09-21 RX ADMIN — Medication 325 MILLIGRAM(S): at 20:50

## 2019-09-21 RX ADMIN — HEPARIN SODIUM 3500 UNIT(S): 5000 INJECTION INTRAVENOUS; SUBCUTANEOUS at 21:20

## 2019-09-21 NOTE — ED PROVIDER NOTE - CLINICAL SUMMARY MEDICAL DECISION MAKING FREE TEXT BOX
Pt with worsening sob and anxiety, found to have recurrence of fluid overload likely 2/2 NSTEMI, cardiology consulted and accepted pt to CCU for further management

## 2019-09-21 NOTE — H&P ADULT - HISTORY OF PRESENT ILLNESS
68F with PMHx of uncontrolled DM (non-compliant), viral myopathy, recent labial abscess s/p ID, hip fracture s/p fixation, recent LGIB bleed now presents for shortness of breath. Patient has been having shortness of breath for the last day. Occurs on exertion and stationary however no worsening of breathing when laying down. Patient denied abdominal pain however family reports she has been having some abdominal discomfort over the last day. Patient also has an indwelling montejo for the last 3 weeks for urinary retention. Reports cough with no sputum production. Denies chest pain, fever, nausea/vomiting, diarrhea. 68F with PMHx of uncontrolled DM (non-compliant), viral myopathy, Paroxysmal Afib, recent labial abscess s/p ID, hip fracture s/p fixation, recent LGIB bleed now presents for shortness of breath. Patient has been having shortness of breath for the last day. Occurs on exertion and stationary however no worsening of breathing when laying down. Patient denied abdominal pain however family reports she has been having some abdominal discomfort over the last day. Patient also has an indwelling montejo for the last 3 weeks for urinary retention. Reports cough with no sputum production. Denies chest pain, fever, nausea/vomiting, diarrhea.

## 2019-09-21 NOTE — ED PROVIDER NOTE - OBJECTIVE STATEMENT
68y F pmh HTN, CHF EF 55%, DM presents for evaluation of sob. Pt states she has been having intermittent episodes of mild/moderate sob since having hip replacement surgery 1mo ago, no aggravating or reliving factors. Associated anxious feeling. Pt states she checked her blood sugar today which was 400, became anxious and sob. Now presents with continued sob and anxious feeling. Denies fever, cough, ha, cp, weakness, numbness, n/v/d/c

## 2019-09-21 NOTE — H&P ADULT - NSHPREVIEWOFSYSTEMS_GEN_ALL_CORE
REVIEW OF SYSTEMS:    CONSTITUTIONAL: No weakness, fevers or chills  RESPIRATORY: See HPI  CARDIOVASCULAR: No chest pain or palpitations  GASTROINTESTINAL: No melena, blood per rectum  GENITOURINARY: No dysuria, frequency or hematuria  NEUROLOGICAL: No numbness or weakness  SKIN: No itching, rashes

## 2019-09-21 NOTE — CONSULT NOTE ADULT - ASSESSMENT
67 YO F with PMH of HTN, PAF not on anticoagulation, HFpEF, moderate AS, DM, left hip fracture s/p repair, and anxiety p/w cc of worsening sob and le edema.      A & P:  Acute Decompensated Heart Failure  NSTEMI  Paroxysmal A.Fib  moderate AS    -monitor in CCU  -repeat EKG, serial cardiac enzymes and obtain 2d echo  -bedside echo assessment showed moderate globally reduced EF  -start ASA 81mg, Plavix 75, and heparin gtt (PTT goal 50-70)  -start IV lasix 40mg Q24  -c/w BiPAP for respiratory support  -will follow

## 2019-09-21 NOTE — ED ADULT TRIAGE NOTE - CHIEF COMPLAINT QUOTE
pt biba for diff breathing  hyperglycemic and hypotensive on scene ( hx of anxiety. dm) pt biba for diff breathing  hyperglycemic and hypotensive on scene ( hx of anxiety. dm) ( f/s 211 in [facility] )

## 2019-09-21 NOTE — H&P ADULT - ATTENDING COMMENTS
68F with PMHx of uncontrolled DM (non-compliant), viral myopathy, Paroxysmal Afib, recent labial abscess s/p ID, hip fracture s/p fixation, recent LGIB bleed now presents for shortness of breath. Patient has been having shortness of breath for the last day. Occurs on exertion and stationary however no worsening of breathing when laying down. Patient denied abdominal pain however family reports she has been having some abdominal discomfort over the last day. Patient also has an indwelling montejo for the last 3 weeks for urinary retention. Reports cough with no sputum production. Denies chest pain, fever, nausea/vomiting, diarrhea.     REVIEW OF SYSTEMS:  CONSTITUTIONAL: No weakness, fevers or chills  EYES/ENT: No visual changes;  No vertigo or throat pain   NECK: No pain or stiffness  RESPIRATORY: No cough, wheezing, hemoptysis; No shortness of breath  CARDIOVASCULAR: No chest pain or palpitations  GASTROINTESTINAL: No abdominal or epigastric pain. No nausea, vomiting, or hematemesis; No diarrhea or constipation. No melena or hematochezia.  GENITOURINARY: No dysuria, frequency or hematuria  NEUROLOGICAL: No numbness or weakness  SKIN: No itching, rashes    Physical Exam:  General: WN/WD NAD  Neurology: A&Ox3, nonfocal, follows commands  Eyes: PERRLA/ EOMI  ENT/Neck: Neck supple, trachea midline, No JVD  Respiratory: CTA B/L, No wheezing, rales, rhonchi  CV: Normal rate regular rhythm, S1S2, no murmurs, rubs or gallops  Abdominal: Soft, NT, ND +BS,   Extremities: No edema, + peripheral pulses  Skin: No Rashes, Hematoma, Ecchymosis  Incisions: n/a  Tubes: n/a    A/P 68F with PMHx of uncontrolled DM (non-compliant), viral myopathy, Paroxysmal Afib, recent labial abscess s/p ID, hip fracture s/p fixation, recent LGIB bleed now presents for shortness of breath. Patient has been having shortness of breath for the last day. Occurs on exertion and stationary however no worsening of breathing when laying down. Patient denied abdominal pain however family reports she has been having some abdominal discomfort over the last day. Patient also has an indwelling montejo for the last 3 weeks for urinary retention. Reports cough with no sputum production. Denies chest pain, fever, nausea/vomiting, diarrhea.     REVIEW OF SYSTEMS: see cc/HPI   CONSTITUTIONAL: No weakness, fevers or chills  EYES/ENT: No visual changes;  No vertigo or throat pain   NECK: No pain or stiffness  RESPIRATORY: No cough, wheezing, hemoptysis; (+) shortness of breath, see cc/HPI  CARDIOVASCULAR: No chest pain or palpitations  GASTROINTESTINAL: No abdominal or epigastric pain. No nausea, vomiting, or hematemesis; No diarrhea or constipation. No melena or hematochezia.  GENITOURINARY: No dysuria, frequency or hematuria  NEUROLOGICAL: No numbness or weakness  SKIN: No itching, rashes    Physical Exam:  General: WN/WD On BIPAP at time of eval and restless  Neurology: A&Ox3, nonfocal, follows commands  Eyes: PERRLA/ EOMI  ENT/Neck: Neck supple, trachea midline, No JVD  Respiratory: B/L rales , Scattered wheezing, (+) rhonchi  CV: Normal rate regular rhythm, S1S2, no murmurs, rubs or gallops  Abdominal: Soft, NT, ND +BS,   Extremities: No edema, + peripheral pulses  Skin: No Rashes, Hematoma, Ecchymosis  Incisions: n/a  Tubes: n/a    A/P  NSTEMI /Acute resp failure / HFpEF  -c/w BiPAP  -serial EKG and Shanna  -IV lasix, IV heparin, ASA, Plavix   -Is and Os, daily weights  -2D echo  -CXR in the AM  -Cardiology eval     RIC 2/2 HF  - hold ARBs    Transaminitis possible 2/2 liver congestion   -serial LFT and RUQ sono    DVT prophylaxis     GI prophylaxis

## 2019-09-21 NOTE — CONSULT NOTE ADULT - SUBJECTIVE AND OBJECTIVE BOX
Date of Admission: 9/21/2019    CHIEF COMPLAINT: sob    HISTORY OF PRESENT ILLNESS: 68yFemale with PMH below presented to the hospital for c/o sob. Pt. and family report that pt. has been progressively getting sob for the past 2 weeks, and she reported noticing worsening bilateral lower extremity edema. Pt. also noticed her FS was 400, and got very anxious and came to the ER. Pt. reportedly says that she saw her Cardiologist 2 weeks ago.    Of note pt. underwent left hip surgery one month ago for left femoral fracture.     Pt. denies any active chest pain, chest pressure. Does endorses to having sob as above.     In the ER pt. was found hypoxic and was placed on BiPAP. CXR showed pulmonary congestion consistent with pulmonary edema. EKG showed anteroseptal Q waves and some ST depressions in lateral leads. Pt. in no active pain, with improvement in sob with BiPAP. First set of cardiac troponin elevated to 0.43 and bedside echo showed moderate globally reduced EF. Pt. was started on medical therapy for NSTEMI, and started on treatment for acute decompensated heart failure. Case was discussed with interventional Cardiologist on call (Dr. Baeza)    PAST MEDICAL & SURGICAL HISTORY:  HTN  Paroxysmal A.Fib  HFpEF  moderate AS  Female bladder prolapse  Diabetes  History of repair of hip fracture  Anxiety      FAMILY HISTORY:  [x] no pertinent family history of premature cardiovascular disease in first degree relatives.  Mother:   Father:   Siblings:     SOCIAL HISTORY:    [ ] Non-smoker  [x  Former smoker  [ ] Alcohol    Allergies    No Known Allergies    Intolerances    	    REVIEW OF SYSTEMS:  CONSTITUTIONAL: No fever, weight loss, or fatigue  CARDIOLOGY: PAtient denies chest pain, syncopal episodes.   RESPIRATORY: denies wheezeing.   NEUROLOGICAL: NO weakness, no focal deficits to report.  ENDOCRINOLOGICAL: no recent change in diabetic medications.   GI: no BRBPR, no N,V,diarrhea.    PSYCHIATRY: normal mood and affect  HEENT: no nasal discharge, no ecchymosis  SKIN: no ecchymosis, no breakdown  MUSCULOSKELETAL: Full range of motion x4.     PHYSICAL EXAM:  T(C): 36.7 (09-21-19 @ 21:31), Max: 36.7 (09-21-19 @ 21:31)  HR: 119 (09-21-19 @ 21:31) (110 - 120)  BP: 107/67 (09-21-19 @ 21:31) (103/65 - 107/67)  RR: 18 (09-21-19 @ 21:31) (18 - 18)  SpO2: 100% (09-21-19 @ 21:31) (97% - 100%)  Wt(kg): --  I&O's Summary    21 Sep 2019 07:01  -  21 Sep 2019 22:47  --------------------------------------------------------  IN: 0 mL / OUT: 300 mL / NET: -300 mL        General Appearance: well appearing, normal for age and gender. 	  Neck: normal JVP, no bruit.   Eyes: No xanthomalasia, Extra Ocular muscles intact.   Cardiovascular: regular rate and rhythm S1 S2, No JVD, No murmurs, No edema  Respiratory: Lungs clear to auscultation	  Psychiatry: Alert and oriented x 3, Mood & affect appropriate  Gastrointestinal:  Soft, Non-tender  Skin/Integumen: No rashes, No ecchymoses, No cyanosis	  Neurologic: Non-focal  Musculoskeletal/ extremities: Normal range of motion, No clubbing, cyanosis or edema  Vascular: Peripheral pulses palpable 2+ bilaterally    LABS:	 	                          9.8    8.27  )-----------( 311      ( 21 Sep 2019 16:41 )             31.2     09-21    131<L>  |  102  |  58<H>  ----------------------------<  349<H>  6.5<HH>   |  11<L>  |  1.2    Ca    10.0      21 Sep 2019 16:41  Mg     2.2     09-21    TPro  7.0  /  Alb  3.8  /  TBili  0.4  /  DBili  x   /  AST  161<H>  /  ALT  146<H>  /  AlkPhos  222<H>  09-21    CARDIAC MARKERS ( 21 Sep 2019 16:41 )  x     / 0.43 ng/mL / x     / x     / x          PT/INR - ( 21 Sep 2019 16:41 )   PT: 12.00 sec;   INR: 1.04 ratio         PTT - ( 21 Sep 2019 16:41 )  PTT:32.1 sec      TELEMETRY EVENTS: 	 sinus tachycardia    ECG:  	sinus tach @121 bpm, 1st degree AVB, anteroseptal Q's, lateral ST depressions    RADIOLOGY:  CXR: pulmonary congestion R>L	    PREVIOUS DIAGNOSTIC TESTING:    [x] Echocardiogram:  < from: Transthoracic Echocardiogram (08.02.19 @ 09:11) >   1. Left ventricular ejection fraction, by visual estimation, is 55 to   60%.   2. LV Ejection Fraction by Renner's Method with a biplane EF of 58 %.   3. Mildly increased LV wall thickness.   4. Spectral Doppler shows impaired relaxation pattern of left   ventricular myocardial filling (Grade I diastolic dysfunction).   5. Normal right atrial size.   6. Trivial pericardial effusion.   7. Mild mitral annular calcification.   8. Mild mitral valve regurgitation.   9. Mild thickening of the anterior and posterior mitral valve leaflets.  10. Mild aortic valve stenosis.  11. Peak transaortic gradient equals 27.0 mmHg, mean transaortic gradient   equals 10.9 mmHg, the calculated aortic valve area equals 1.34 cm² by the   continuity equation consistent with moderate aortic stenosis.      [x]  Catheterization: none  	    Home Medications:  aspirin 81 mg oral tablet: 1 tab(s) orally once a day (29 Aug 2019 20:19)    MEDICATIONS  (STANDING):  chlorhexidine 4% Liquid 1 Application(s) Topical <User Schedule>  heparin  Infusion.  Unit(s)/Hr (7 mL/Hr) IV Continuous <Continuous>    MEDICATIONS  (PRN):  heparin  Injectable 3500 Unit(s) IV Push every 6 hours PRN For aPTT less than 40

## 2019-09-21 NOTE — CONSULT NOTE ADULT - ATTENDING COMMENTS
The patient needs to be stabilized prior to bringing her up to the cath lab I was not on call this weekend, this consult was done on Saturday 21st, on Elvis morning 22nd Dr Rea, fellow, called me and discussed the patietn with me (I informed him that Dr Cisneros was on call), later on I also spoke to dr Nelson other fellow, and advuised gentle diuresis, 20 mg iv bid Lasix, Levophed, and starting milrinone drip, but caused her A.fib, we had to stop and giving her Digoxin (afib, tachycardia, hypotension).    I had never seen her outside, just once I did an inpatient pre op risk assessment, when she had a fall and hip Fx. she supposed to follow up, but did not do it.

## 2019-09-21 NOTE — ED PROVIDER NOTE - ATTENDING CONTRIBUTION TO CARE
67 yo f with pmh of recently diagnosed dm, htn, chf (10 yrs ago), former smoker, recent Left hip fracture with replacement 8/2019, presents with c/o elevated glucose at home with anxiety and sob.  pt says her daughter took her fsg today and was above 400.  pt says she became anxious which caused her to become sob.  pt says sob usually accompanies her anxiety.  admits feels chest tightness.  pt reports she has been having LLE swelling since the surgery with slight skin darkening.  exam: nad, ncat, perrl, eomi, mmm, rrr, ctab, abd soft, nt,nd aox3, anxious, LLE swelling, slightly darker coloration, no erythema, no calf tenderness, no hip ttp b/l imp: pt with recent L hip replacement and diagnosis of dm, as per family, her fsg has been ranging between 200-300 since discharge, only on oral meds.  pt with intermittent worsening of her sob, found to be 92% on RA and 1 month of left leg swelling.  will check labs, ekg, trops, duplex.  concern for PE, will CTA chest.

## 2019-09-21 NOTE — H&P ADULT - ASSESSMENT
#NSTEMI  - 0.43 troponin  - #NSTEMI  - 0.43 troponin  -     #Acute decompensated hear failure  - 80282 BNP, #NSTEMI  - 0.43 troponin  -     #Acute decompensated hear failure  - 06131 BNP,   - Lasix  - Strict Is and Os  - Chest x-ray in AM    #RIC  - Creatinine 1.2, previously 0.7  -     #Transaminitis  - LFTs hemolyzed will repeat at 11:30 #NSTEMI  - 0.43 troponin  - s/p aspirin/plavix  - heparin drip    #Acute decompensated hear failure  - 20778 BNP, bilateral pleural effusions  - Lasix  - Strict Is and Os  - Chest x-ray in AM  - EF 55-60    #RIC  - Creatinine 1.2, previously 0.7  -     #Transaminitis  - LFTs hemolyzed will repeat at 11:30 #NSTEMI  - 0.43 troponin  - s/p aspirin/plavix  - heparin drip - f/u repeat pTT   - statin if tolerates off BiPAP    #Acute decompensated hear failure  - 20778 BNP, bilateral pleural effusions  - Lasix q24  - Strict Is and Os  - Chest x-ray in AM  - EF 55-60    #RIC  - Creatinine 1.2, previously 0.7  - Monitor I's and O's  - Hold ARBs  - f/u repeat labs    #Transaminitis  - LFTs hemolyzed will repeat at 11:30     #Chronic montejo  - last changed yesterday at Urologist office   - in place due to urinary retention    #Diet: NPO for now  #Activity: Increase as tolerated  #DVT ppx: Heparin drip

## 2019-09-21 NOTE — ED PROVIDER NOTE - NS ED ROS FT
Constitutional: (+) anxious, (-) fever  Eyes/ENT: (-) blurry vision, (-) epistaxis  Cardiovascular: (-) chest pain, (-) syncope  Respiratory: (+) shortness of breath, (-) cough  Gastrointestinal: (-) vomiting, (-) diarrhea  : (-) dysuria, (-) hematuria  Musculoskeletal: (-) neck pain, (-) back pain, (-) joint pain  Integumentary: (-) rash, (-) edema  Neurological: (-) headache, (-) altered mental status  Allergic/Immunologic: (-) pruritus

## 2019-09-21 NOTE — ED ADULT NURSE NOTE - OBJECTIVE STATEMENT
pt c/o shortness of breath/chest pressure after checking her blood sugar and seeing it was in the 400s this afternoon.  pt states she has pmh of anxiety. pt s/p hip surgery 8 weeks ago and d/michael home recently. pt also presents with b/l le swelling. denies any n/v/d. denies any chest pain. as per pts daughter, pt has not been sleeping/eating regularly since d/michael from hospital. denies any fever/chills.

## 2019-09-21 NOTE — H&P ADULT - NSHPPHYSICALEXAM_GEN_ALL_CORE
PHYSICAL EXAM:  GENERAL: on BiPAP, anxious  HEAD:  Atraumatic, Normocephalic  EYES: EOMI, PERRLA, conjunctiva and sclera clear  NECK: Supple, No JVD  CHEST/LUNG: bibasilar crackles  HEART: Regular rate and rhythm; No murmurs, rubs, or gallops  ABDOMEN: Soft, Nontender, Nondistended; Bowel sounds present  EXTREMITIES:  +2 pitting edema presents up to mid shin  PSYCH: AAOx3  NEUROLOGY: non-focal  SKIN: No rashes or lesions

## 2019-09-21 NOTE — ED ADULT NURSE NOTE - CHIEF COMPLAINT QUOTE
pt biba for diff breathing  hyperglycemic and hypotensive on scene ( hx of anxiety. dm) ( f/s 211 in [facility] )

## 2019-09-21 NOTE — ED PROVIDER NOTE - CARE PLAN
Principal Discharge DX:	Chest pain  Secondary Diagnosis:	SOB (shortness of breath)  Secondary Diagnosis:	Elevated troponin

## 2019-09-21 NOTE — ED PROVIDER NOTE - PHYSICAL EXAMINATION
CONST: Well appearing in NAD  EYES: PERRL, EOMI, Sclera and conjunctiva clear.   ENT: No nasal discharge. Oropharynx normal appearing, no erythema or exudates. No abscess or swelling. Uvula midline.   NECK: Non-tender, no meningeal signs. normal ROM. supple   CARD: Tachycardic S1 S2; No jvd  RESP: RLL rhonchi. spO2 92% ra  GI: Soft, non-tender, non-distended. no cva tenderness. normal BS  MS: Normal ROM in all extremities. pulses 2 +. no calf tenderness or swelling  SKIN: Warm, dry, no acute rashes. Good turgor  NEURO: A&Ox3, No focal deficits. Strength 5/5 with no sensory deficits. Steady gait.

## 2019-09-21 NOTE — CHART NOTE - NSCHARTNOTEFT_GEN_A_CORE
Pt. was evaluated in the ER at bedside. She was found hypoxic and was placed on BiPAP. CXR showed pulmonary congestion consistent with pulmonary edema. Pt. remained hemodynamically stable. EKG showed anteroseptal Q waves and some ST depressions in lateral leads. Pt. in no active pain, with improvement in sob with BiPAP. First set of cardiac troponin elevated to 0.43 and bedside echo showed moderate globally reduced EF. Pt. was started on medical therapy for NSTEMI, and was started on treatment for acute decompensated heart failure. Pt. will be monitored in CCU. Case was discussed with interventional Cardiologist on call (Dr. Baeza). See consult note for details.

## 2019-09-22 LAB
ALBUMIN SERPL ELPH-MCNC: 3.6 G/DL — SIGNIFICANT CHANGE UP (ref 3.5–5.2)
ALBUMIN SERPL ELPH-MCNC: 3.7 G/DL — SIGNIFICANT CHANGE UP (ref 3.5–5.2)
ALP SERPL-CCNC: 184 U/L — HIGH (ref 30–115)
ALP SERPL-CCNC: 196 U/L — HIGH (ref 30–115)
ALT FLD-CCNC: 122 U/L — HIGH (ref 0–41)
ALT FLD-CCNC: 122 U/L — HIGH (ref 0–41)
ANION GAP SERPL CALC-SCNC: 13 MMOL/L — SIGNIFICANT CHANGE UP (ref 7–14)
ANION GAP SERPL CALC-SCNC: 16 MMOL/L — HIGH (ref 7–14)
APTT BLD: 27 SEC — SIGNIFICANT CHANGE UP (ref 27–39.2)
APTT BLD: 30.3 SEC — SIGNIFICANT CHANGE UP (ref 27–39.2)
APTT BLD: 74.2 SEC — CRITICAL HIGH (ref 27–39.2)
AST SERPL-CCNC: 86 U/L — HIGH (ref 0–41)
AST SERPL-CCNC: 97 U/L — HIGH (ref 0–41)
BASE EXCESS BLDA CALC-SCNC: -9.1 MMOL/L — LOW (ref -2–2)
BASOPHILS # BLD AUTO: 0.02 K/UL — SIGNIFICANT CHANGE UP (ref 0–0.2)
BASOPHILS # BLD AUTO: 0.02 K/UL — SIGNIFICANT CHANGE UP (ref 0–0.2)
BASOPHILS NFR BLD AUTO: 0.2 % — SIGNIFICANT CHANGE UP (ref 0–1)
BASOPHILS NFR BLD AUTO: 0.2 % — SIGNIFICANT CHANGE UP (ref 0–1)
BILIRUB SERPL-MCNC: 0.3 MG/DL — SIGNIFICANT CHANGE UP (ref 0.2–1.2)
BILIRUB SERPL-MCNC: 0.3 MG/DL — SIGNIFICANT CHANGE UP (ref 0.2–1.2)
BUN SERPL-MCNC: 57 MG/DL — HIGH (ref 10–20)
BUN SERPL-MCNC: 58 MG/DL — HIGH (ref 10–20)
CALCIUM SERPL-MCNC: 10.1 MG/DL — SIGNIFICANT CHANGE UP (ref 8.5–10.1)
CALCIUM SERPL-MCNC: 9.6 MG/DL — SIGNIFICANT CHANGE UP (ref 8.5–10.1)
CHLORIDE SERPL-SCNC: 100 MMOL/L — SIGNIFICANT CHANGE UP (ref 98–110)
CHLORIDE SERPL-SCNC: 100 MMOL/L — SIGNIFICANT CHANGE UP (ref 98–110)
CO2 SERPL-SCNC: 17 MMOL/L — SIGNIFICANT CHANGE UP (ref 17–32)
CO2 SERPL-SCNC: 18 MMOL/L — SIGNIFICANT CHANGE UP (ref 17–32)
CREAT SERPL-MCNC: 1.2 MG/DL — SIGNIFICANT CHANGE UP (ref 0.7–1.5)
CREAT SERPL-MCNC: 1.2 MG/DL — SIGNIFICANT CHANGE UP (ref 0.7–1.5)
EOSINOPHIL # BLD AUTO: 0.01 K/UL — SIGNIFICANT CHANGE UP (ref 0–0.7)
EOSINOPHIL # BLD AUTO: 0.05 K/UL — SIGNIFICANT CHANGE UP (ref 0–0.7)
EOSINOPHIL NFR BLD AUTO: 0.1 % — SIGNIFICANT CHANGE UP (ref 0–8)
EOSINOPHIL NFR BLD AUTO: 0.6 % — SIGNIFICANT CHANGE UP (ref 0–8)
GAS PNL BLDA: SIGNIFICANT CHANGE UP
GLUCOSE BLDC GLUCOMTR-MCNC: 104 MG/DL — HIGH (ref 70–99)
GLUCOSE BLDC GLUCOMTR-MCNC: 124 MG/DL — HIGH (ref 70–99)
GLUCOSE BLDC GLUCOMTR-MCNC: 166 MG/DL — HIGH (ref 70–99)
GLUCOSE BLDC GLUCOMTR-MCNC: 190 MG/DL — HIGH (ref 70–99)
GLUCOSE BLDC GLUCOMTR-MCNC: 198 MG/DL — HIGH (ref 70–99)
GLUCOSE BLDC GLUCOMTR-MCNC: 215 MG/DL — HIGH (ref 70–99)
GLUCOSE BLDC GLUCOMTR-MCNC: 228 MG/DL — HIGH (ref 70–99)
GLUCOSE BLDC GLUCOMTR-MCNC: 277 MG/DL — HIGH (ref 70–99)
GLUCOSE BLDC GLUCOMTR-MCNC: 329 MG/DL — HIGH (ref 70–99)
GLUCOSE BLDC GLUCOMTR-MCNC: 408 MG/DL — HIGH (ref 70–99)
GLUCOSE BLDC GLUCOMTR-MCNC: 443 MG/DL — HIGH (ref 70–99)
GLUCOSE SERPL-MCNC: 332 MG/DL — HIGH (ref 70–99)
GLUCOSE SERPL-MCNC: 392 MG/DL — HIGH (ref 70–99)
HCO3 BLDA-SCNC: 16 MMOL/L — LOW (ref 23–27)
HCT VFR BLD CALC: 27.3 % — LOW (ref 37–47)
HCT VFR BLD CALC: 28.6 % — LOW (ref 37–47)
HGB BLD-MCNC: 8.8 G/DL — LOW (ref 12–16)
HGB BLD-MCNC: 9.2 G/DL — LOW (ref 12–16)
HOROWITZ INDEX BLDA+IHG-RTO: 40 — SIGNIFICANT CHANGE UP
IMM GRANULOCYTES NFR BLD AUTO: 0.4 % — HIGH (ref 0.1–0.3)
IMM GRANULOCYTES NFR BLD AUTO: 0.4 % — HIGH (ref 0.1–0.3)
INR BLD: 1.11 RATIO — SIGNIFICANT CHANGE UP (ref 0.65–1.3)
LYMPHOCYTES # BLD AUTO: 0.55 K/UL — LOW (ref 1.2–3.4)
LYMPHOCYTES # BLD AUTO: 0.92 K/UL — LOW (ref 1.2–3.4)
LYMPHOCYTES # BLD AUTO: 10.9 % — LOW (ref 20.5–51.1)
LYMPHOCYTES # BLD AUTO: 5.8 % — LOW (ref 20.5–51.1)
MAGNESIUM SERPL-MCNC: 2 MG/DL — SIGNIFICANT CHANGE UP (ref 1.8–2.4)
MAGNESIUM SERPL-MCNC: 2 MG/DL — SIGNIFICANT CHANGE UP (ref 1.8–2.4)
MCHC RBC-ENTMCNC: 26.4 PG — LOW (ref 27–31)
MCHC RBC-ENTMCNC: 26.4 PG — LOW (ref 27–31)
MCHC RBC-ENTMCNC: 32.2 G/DL — SIGNIFICANT CHANGE UP (ref 32–37)
MCHC RBC-ENTMCNC: 32.2 G/DL — SIGNIFICANT CHANGE UP (ref 32–37)
MCV RBC AUTO: 81.9 FL — SIGNIFICANT CHANGE UP (ref 81–99)
MCV RBC AUTO: 82 FL — SIGNIFICANT CHANGE UP (ref 81–99)
MONOCYTES # BLD AUTO: 0.36 K/UL — SIGNIFICANT CHANGE UP (ref 0.1–0.6)
MONOCYTES # BLD AUTO: 0.47 K/UL — SIGNIFICANT CHANGE UP (ref 0.1–0.6)
MONOCYTES NFR BLD AUTO: 3.8 % — SIGNIFICANT CHANGE UP (ref 1.7–9.3)
MONOCYTES NFR BLD AUTO: 5.6 % — SIGNIFICANT CHANGE UP (ref 1.7–9.3)
NEUTROPHILS # BLD AUTO: 6.93 K/UL — HIGH (ref 1.4–6.5)
NEUTROPHILS # BLD AUTO: 8.49 K/UL — HIGH (ref 1.4–6.5)
NEUTROPHILS NFR BLD AUTO: 82.3 % — HIGH (ref 42.2–75.2)
NEUTROPHILS NFR BLD AUTO: 89.7 % — HIGH (ref 42.2–75.2)
NRBC # BLD: 0 /100 WBCS — SIGNIFICANT CHANGE UP (ref 0–0)
NRBC # BLD: 0 /100 WBCS — SIGNIFICANT CHANGE UP (ref 0–0)
PCO2 BLDA: 33 MMHG — LOW (ref 38–42)
PH BLDA: 7.3 — LOW (ref 7.38–7.42)
PLATELET # BLD AUTO: 281 K/UL — SIGNIFICANT CHANGE UP (ref 130–400)
PLATELET # BLD AUTO: 346 K/UL — SIGNIFICANT CHANGE UP (ref 130–400)
PO2 BLDA: 135 MMHG — HIGH (ref 78–95)
POTASSIUM SERPL-MCNC: 5 MMOL/L — SIGNIFICANT CHANGE UP (ref 3.5–5)
POTASSIUM SERPL-MCNC: 5.2 MMOL/L — HIGH (ref 3.5–5)
POTASSIUM SERPL-SCNC: 5 MMOL/L — SIGNIFICANT CHANGE UP (ref 3.5–5)
POTASSIUM SERPL-SCNC: 5.2 MMOL/L — HIGH (ref 3.5–5)
PROT SERPL-MCNC: 6.2 G/DL — SIGNIFICANT CHANGE UP (ref 6–8)
PROT SERPL-MCNC: 6.4 G/DL — SIGNIFICANT CHANGE UP (ref 6–8)
PROTHROM AB SERPL-ACNC: 12.8 SEC — SIGNIFICANT CHANGE UP (ref 9.95–12.87)
RBC # BLD: 3.33 M/UL — LOW (ref 4.2–5.4)
RBC # BLD: 3.49 M/UL — LOW (ref 4.2–5.4)
RBC # FLD: 14 % — SIGNIFICANT CHANGE UP (ref 11.5–14.5)
RBC # FLD: 14.1 % — SIGNIFICANT CHANGE UP (ref 11.5–14.5)
SAO2 % BLDA: 99 % — HIGH (ref 94–98)
SODIUM SERPL-SCNC: 131 MMOL/L — LOW (ref 135–146)
SODIUM SERPL-SCNC: 133 MMOL/L — LOW (ref 135–146)
TROPONIN T SERPL-MCNC: 0.4 NG/ML — CRITICAL HIGH
TROPONIN T SERPL-MCNC: 0.99 NG/ML — CRITICAL HIGH
WBC # BLD: 8.42 K/UL — SIGNIFICANT CHANGE UP (ref 4.8–10.8)
WBC # BLD: 9.47 K/UL — SIGNIFICANT CHANGE UP (ref 4.8–10.8)
WBC # FLD AUTO: 8.42 K/UL — SIGNIFICANT CHANGE UP (ref 4.8–10.8)
WBC # FLD AUTO: 9.47 K/UL — SIGNIFICANT CHANGE UP (ref 4.8–10.8)

## 2019-09-22 PROCEDURE — 71045 X-RAY EXAM CHEST 1 VIEW: CPT | Mod: 26,76

## 2019-09-22 PROCEDURE — 93306 TTE W/DOPPLER COMPLETE: CPT | Mod: 26

## 2019-09-22 PROCEDURE — 76705 ECHO EXAM OF ABDOMEN: CPT | Mod: 26

## 2019-09-22 PROCEDURE — 99223 1ST HOSP IP/OBS HIGH 75: CPT | Mod: AI

## 2019-09-22 PROCEDURE — 93010 ELECTROCARDIOGRAM REPORT: CPT

## 2019-09-22 RX ORDER — MEROPENEM 1 G/30ML
1000 INJECTION INTRAVENOUS EVERY 12 HOURS
Refills: 0 | Status: DISCONTINUED | OUTPATIENT
Start: 2019-09-22 | End: 2019-09-22

## 2019-09-22 RX ORDER — CHLORHEXIDINE GLUCONATE 213 G/1000ML
15 SOLUTION TOPICAL
Refills: 0 | Status: DISCONTINUED | OUTPATIENT
Start: 2019-09-22 | End: 2019-09-25

## 2019-09-22 RX ORDER — MEROPENEM 1 G/30ML
INJECTION INTRAVENOUS
Refills: 0 | Status: DISCONTINUED | OUTPATIENT
Start: 2019-09-22 | End: 2019-09-22

## 2019-09-22 RX ORDER — MEROPENEM 1 G/30ML
1000 INJECTION INTRAVENOUS ONCE
Refills: 0 | Status: COMPLETED | OUTPATIENT
Start: 2019-09-22 | End: 2019-09-22

## 2019-09-22 RX ORDER — SODIUM CHLORIDE 9 MG/ML
250 INJECTION INTRAMUSCULAR; INTRAVENOUS; SUBCUTANEOUS ONCE
Refills: 0 | Status: COMPLETED | OUTPATIENT
Start: 2019-09-22 | End: 2019-09-22

## 2019-09-22 RX ORDER — AMIODARONE HYDROCHLORIDE 400 MG/1
0.5 TABLET ORAL
Qty: 900 | Refills: 0 | Status: DISCONTINUED | OUTPATIENT
Start: 2019-09-22 | End: 2019-09-23

## 2019-09-22 RX ORDER — VANCOMYCIN HCL 1 G
1000 VIAL (EA) INTRAVENOUS EVERY 12 HOURS
Refills: 0 | Status: DISCONTINUED | OUTPATIENT
Start: 2019-09-22 | End: 2019-09-22

## 2019-09-22 RX ORDER — VANCOMYCIN HCL 1 G
1000 VIAL (EA) INTRAVENOUS ONCE
Refills: 0 | Status: COMPLETED | OUTPATIENT
Start: 2019-09-22 | End: 2019-09-22

## 2019-09-22 RX ORDER — AMIODARONE HYDROCHLORIDE 400 MG/1
1 TABLET ORAL
Qty: 900 | Refills: 0 | Status: DISCONTINUED | OUTPATIENT
Start: 2019-09-22 | End: 2019-09-22

## 2019-09-22 RX ORDER — DIGOXIN 250 MCG
0.25 TABLET ORAL ONCE
Refills: 0 | Status: COMPLETED | OUTPATIENT
Start: 2019-09-22 | End: 2019-09-22

## 2019-09-22 RX ORDER — INSULIN HUMAN 100 [IU]/ML
1 INJECTION, SOLUTION SUBCUTANEOUS
Qty: 100 | Refills: 0 | Status: DISCONTINUED | OUTPATIENT
Start: 2019-09-22 | End: 2019-09-24

## 2019-09-22 RX ORDER — DOCUSATE SODIUM 100 MG
100 CAPSULE ORAL
Refills: 0 | Status: DISCONTINUED | OUTPATIENT
Start: 2019-09-22 | End: 2019-09-25

## 2019-09-22 RX ORDER — MIDAZOLAM HYDROCHLORIDE 1 MG/ML
2 INJECTION, SOLUTION INTRAMUSCULAR; INTRAVENOUS ONCE
Refills: 0 | Status: DISCONTINUED | OUTPATIENT
Start: 2019-09-22 | End: 2019-09-22

## 2019-09-22 RX ORDER — VANCOMYCIN HCL 1 G
VIAL (EA) INTRAVENOUS
Refills: 0 | Status: DISCONTINUED | OUTPATIENT
Start: 2019-09-22 | End: 2019-09-22

## 2019-09-22 RX ORDER — DOCUSATE SODIUM 100 MG
100 CAPSULE ORAL THREE TIMES A DAY
Refills: 0 | Status: DISCONTINUED | OUTPATIENT
Start: 2019-09-22 | End: 2019-09-22

## 2019-09-22 RX ORDER — FUROSEMIDE 40 MG
40 TABLET ORAL DAILY
Refills: 0 | Status: DISCONTINUED | OUTPATIENT
Start: 2019-09-22 | End: 2019-09-23

## 2019-09-22 RX ORDER — AMIODARONE HYDROCHLORIDE 400 MG/1
150 TABLET ORAL ONCE
Refills: 0 | Status: COMPLETED | OUTPATIENT
Start: 2019-09-22 | End: 2019-09-22

## 2019-09-22 RX ORDER — DIGOXIN 250 MCG
0.25 TABLET ORAL EVERY 4 HOURS
Refills: 0 | Status: COMPLETED | OUTPATIENT
Start: 2019-09-22 | End: 2019-09-23

## 2019-09-22 RX ORDER — HEPARIN SODIUM 5000 [USP'U]/ML
1200 INJECTION INTRAVENOUS; SUBCUTANEOUS
Qty: 25000 | Refills: 0 | Status: DISCONTINUED | OUTPATIENT
Start: 2019-09-22 | End: 2019-09-23

## 2019-09-22 RX ORDER — MILRINONE LACTATE 1 MG/ML
0.3 INJECTION, SOLUTION INTRAVENOUS
Qty: 20 | Refills: 0 | Status: DISCONTINUED | OUTPATIENT
Start: 2019-09-22 | End: 2019-09-22

## 2019-09-22 RX ORDER — NOREPINEPHRINE BITARTRATE/D5W 8 MG/250ML
0.05 PLASTIC BAG, INJECTION (ML) INTRAVENOUS
Qty: 8 | Refills: 0 | Status: DISCONTINUED | OUTPATIENT
Start: 2019-09-22 | End: 2019-09-24

## 2019-09-22 RX ORDER — INSULIN LISPRO 100/ML
5 VIAL (ML) SUBCUTANEOUS ONCE
Refills: 0 | Status: COMPLETED | OUTPATIENT
Start: 2019-09-22 | End: 2019-09-22

## 2019-09-22 RX ORDER — SENNA PLUS 8.6 MG/1
2 TABLET ORAL AT BEDTIME
Refills: 0 | Status: DISCONTINUED | OUTPATIENT
Start: 2019-09-22 | End: 2019-09-25

## 2019-09-22 RX ORDER — SODIUM BICARBONATE 1 MEQ/ML
650 SYRINGE (ML) INTRAVENOUS EVERY 8 HOURS
Refills: 0 | Status: DISCONTINUED | OUTPATIENT
Start: 2019-09-22 | End: 2019-09-24

## 2019-09-22 RX ORDER — FENTANYL CITRATE 50 UG/ML
0.5 INJECTION INTRAVENOUS
Qty: 2500 | Refills: 0 | Status: DISCONTINUED | OUTPATIENT
Start: 2019-09-22 | End: 2019-09-24

## 2019-09-22 RX ADMIN — CLOPIDOGREL BISULFATE 75 MILLIGRAM(S): 75 TABLET, FILM COATED ORAL at 12:15

## 2019-09-22 RX ADMIN — ATORVASTATIN CALCIUM 80 MILLIGRAM(S): 80 TABLET, FILM COATED ORAL at 12:15

## 2019-09-22 RX ADMIN — Medication 5.71 MICROGRAM(S)/KG/MIN: at 04:30

## 2019-09-22 RX ADMIN — Medication 40 MILLIGRAM(S): at 12:15

## 2019-09-22 RX ADMIN — MORPHINE SULFATE 2 MILLIGRAM(S): 50 CAPSULE, EXTENDED RELEASE ORAL at 00:11

## 2019-09-22 RX ADMIN — Medication 100 MILLIGRAM(S): at 18:32

## 2019-09-22 RX ADMIN — Medication 250 MILLIGRAM(S): at 06:59

## 2019-09-22 RX ADMIN — AMIODARONE HYDROCHLORIDE 618 MILLIGRAM(S): 400 TABLET ORAL at 02:10

## 2019-09-22 RX ADMIN — INSULIN HUMAN 1 UNIT(S)/HR: 100 INJECTION, SOLUTION SUBCUTANEOUS at 05:00

## 2019-09-22 RX ADMIN — FENTANYL CITRATE 3.04 MICROGRAM(S)/KG/HR: 50 INJECTION INTRAVENOUS at 03:51

## 2019-09-22 RX ADMIN — Medication 650 MILLIGRAM(S): at 13:47

## 2019-09-22 RX ADMIN — Medication 5 UNIT(S): at 03:46

## 2019-09-22 RX ADMIN — MEROPENEM 100 MILLIGRAM(S): 1 INJECTION INTRAVENOUS at 06:08

## 2019-09-22 RX ADMIN — AMIODARONE HYDROCHLORIDE 33.33 MG/MIN: 400 TABLET ORAL at 03:00

## 2019-09-22 RX ADMIN — HEPARIN SODIUM 12 UNIT(S)/HR: 5000 INJECTION INTRAVENOUS; SUBCUTANEOUS at 12:16

## 2019-09-22 RX ADMIN — INSULIN GLARGINE 7 UNIT(S): 100 INJECTION, SOLUTION SUBCUTANEOUS at 00:39

## 2019-09-22 RX ADMIN — MORPHINE SULFATE 2 MILLIGRAM(S): 50 CAPSULE, EXTENDED RELEASE ORAL at 01:00

## 2019-09-22 RX ADMIN — SENNA PLUS 2 TABLET(S): 8.6 TABLET ORAL at 21:52

## 2019-09-22 RX ADMIN — Medication 650 MILLIGRAM(S): at 21:51

## 2019-09-22 RX ADMIN — CHLORHEXIDINE GLUCONATE 15 MILLILITER(S): 213 SOLUTION TOPICAL at 18:32

## 2019-09-22 RX ADMIN — MIDAZOLAM HYDROCHLORIDE 2 MILLIGRAM(S): 1 INJECTION, SOLUTION INTRAMUSCULAR; INTRAVENOUS at 04:03

## 2019-09-22 RX ADMIN — Medication 81 MILLIGRAM(S): at 12:15

## 2019-09-22 RX ADMIN — Medication 4: at 00:41

## 2019-09-22 RX ADMIN — Medication 0.25 MILLIGRAM(S): at 15:35

## 2019-09-22 RX ADMIN — CHLORHEXIDINE GLUCONATE 1 APPLICATION(S): 213 SOLUTION TOPICAL at 06:08

## 2019-09-22 RX ADMIN — Medication 0.25 MILLIGRAM(S): at 21:52

## 2019-09-22 RX ADMIN — AMIODARONE HYDROCHLORIDE 16.67 MG/MIN: 400 TABLET ORAL at 10:23

## 2019-09-22 RX ADMIN — ATORVASTATIN CALCIUM 80 MILLIGRAM(S): 80 TABLET, FILM COATED ORAL at 00:40

## 2019-09-22 RX ADMIN — Medication 0.25 MILLIGRAM(S): at 18:32

## 2019-09-22 RX ADMIN — MILRINONE LACTATE 5.48 MICROGRAM(S)/KG/MIN: 1 INJECTION, SOLUTION INTRAVENOUS at 12:17

## 2019-09-22 RX ADMIN — SODIUM CHLORIDE 500 MILLILITER(S): 9 INJECTION INTRAMUSCULAR; INTRAVENOUS; SUBCUTANEOUS at 03:00

## 2019-09-22 NOTE — PROGRESS NOTE ADULT - ASSESSMENT
68yFemale with PMH below presented to the hospital for c/o sob. Pt. and family report that pt. has been progressively getting sob for the past 2 weeks, and she reported noticing worsening bilateral lower extremity edema. Pt. also noticed her FS was 400, and got very anxious and came to the ER. Pt. reportedly says that she saw her Cardiologist 2 weeks ago.    Of note pt. underwent left hip surgery one month ago for left femoral fracture.     Pt. denies any active chest pain, chest pressure. Does endorses to having sob as above.     In the ER pt. was found hypoxic and was placed on BiPAP. CXR showed pulmonary congestion consistent with pulmonary edema. EKG showed anteroseptal Q waves and some ST depressions in lateral leads. Pt. in no active pain, with improvement in sob with BiPAP. First set of cardiac troponin elevated to 0.43 and bedside echo showed moderate globally reduced EF. Pt. was started on medical therapy for NSTEMI, and started on treatment for acute decompensated heart failure. Case was discussed with interventional Cardiologist on call (Dr. Baeza)      #NSTEMI  - 0.43 troponin  - s/p aspirin/plavix  - heparin drip - f/u repeat pTT   - statin if tolerates off BiPAP    #Acute decompensated hear failure  - 20778 BNP, bilateral pleural effusions  - Lasix q24  - Strict Is and Os  - Chest x-ray in AM  - EF 55-60    #RIC  - Creatinine 1.2, previously 0.7  - Monitor I's and O's  - Hold ARBs  - f/u repeat labs    #Transaminitis  - LFTs hemolyzed will repeat at 11:30     #Chronic montejo  - last changed yesterday at Urologist office   - in place due to urinary retention    #Diet: NPO for now  #Activity: Increase as tolerated  #DVT ppx: Heparin drip 68yFemale PMHx of uncontrolled DM (non-compliant), anxiety, viral myopathy, Paroxysmal Afib, recent labial abscess s/p ID, L femoral fracture s/p fixation 1 month ago, recent LGIB bleed presents for worsening shortness of breath and LE edema and an elevated FS of 400, course complicated by hypoxia and hypotension requiting intubation and pressors.     #NSTEMI  -no active CP  -9/21 in the ED, EKG showed anteroseptal Q waves and some ST depressions in lateral lead  - 0.43 troponin >0.40   -echo 8/2/19: EF 55 to 60%. Grade I diastolic dysfunction. Trivial pericardial effusion. moderate aortic stenosis.  - continue with asa, statin, plavix  - heparin drip, goal 50-70,   -CTA chest- neg for PE     #Acute decompensated hear failure  - 20778 BNP, CXR bilateral pleural effusions  - Lasix 40 q24  - Strict Is and Os  - Chest x-ray in AM  -bipap PRN/qhs      #hx of GIB  - no pRBCs thus far, H/H stable , cont to monitor   - CTAbd pelvis placed     #RIC  - Creatinine 1.2, previously 0.7, continue to trend   - Monitor I's and O's  - Hold ARBs    #Transaminitis  - CTabd pelvis     #DM  - NPO   -Lantus 7, lispro 5 tid     #Chronic montejo  - last changed yesterday at Urologist office   - in place due to urinary retention    #Diet: NPO for now  #Activity: Increase as tolerated  #DVT ppx: Heparin drip  GI ppx: protonix   Dispo: acute

## 2019-09-22 NOTE — PROGRESS NOTE ADULT - SUBJECTIVE AND OBJECTIVE BOX
SUBJECTIVE:    Patient is a 68y old Female who presents with a chief complaint of sob (21 Sep 2019 22:46)    Currently admitted to medicine with the primary diagnosis of Chest pain     Today is hospital day 1d. This morning she is resting comfortably in bed and reports no new issues or overnight events.     INTERVAL EVENTS:     PAST MEDICAL & SURGICAL HISTORY  Female bladder prolapse  Diabetes  History of repair of hip fracture      ALLERGIES:  No Known Allergies    MEDICATIONS:  STANDING MEDICATIONS  amiodarone Infusion 1 mG/Min IV Continuous <Continuous>  aspirin  chewable 81 milliGRAM(s) Oral daily  atorvastatin 80 milliGRAM(s) Oral daily  chlorhexidine 4% Liquid 1 Application(s) Topical <User Schedule>  clopidogrel Tablet 75 milliGRAM(s) Oral daily  dextrose 5%. 1000 milliLiter(s) IV Continuous <Continuous>  dextrose 50% Injectable 12.5 Gram(s) IV Push once  dextrose 50% Injectable 25 Gram(s) IV Push once  dextrose 50% Injectable 25 Gram(s) IV Push once  fentaNYL   Infusion. 0.5 MICROgram(s)/kG/Hr IV Continuous <Continuous>  furosemide   Injectable 40 milliGRAM(s) IV Push daily  heparin  Infusion.  Unit(s)/Hr IV Continuous <Continuous>  insulin glargine Injectable (LANTUS) 7 Unit(s) SubCutaneous at bedtime  insulin lispro (HumaLOG) corrective regimen sliding scale   SubCutaneous every 6 hours  insulin lispro Injectable (HumaLOG) 5 Unit(s) SubCutaneous once  sodium chloride 0.9% Bolus 250 milliLiter(s) IV Bolus once    PRN MEDICATIONS  dextrose 40% Gel 15 Gram(s) Oral once PRN  glucagon  Injectable 1 milliGRAM(s) IntraMuscular once PRN  heparin  Injectable 3500 Unit(s) IV Push every 6 hours PRN  morphine  - Injectable 2 milliGRAM(s) IV Push every 6 hours PRN    VITALS:   T(F): 96.5  HR: 144  BP: 105/85  RR: 44  SpO2: 98%    LABS:                        9.2    8.42  )-----------( 281      ( 22 Sep 2019 00:43 )             28.6     09-22    133<L>  |  100  |  58<H>  ----------------------------<  332<H>  5.2<H>   |  17  |  1.2    Ca    10.1      22 Sep 2019 00:43  Mg     2.0     09-22    TPro  6.4  /  Alb  3.7  /  TBili  0.3  /  DBili  x   /  AST  97<H>  /  ALT  122<H>  /  AlkPhos  196<H>      PT/INR - ( 21 Sep 2019 16:41 )   PT: 12.00 sec;   INR: 1.04 ratio         PTT - ( 21 Sep 2019 16:41 )  PTT:32.1 sec  Urinalysis Basic - ( 21 Sep 2019 18:14 )    Color: Yellow / Appearance: Slightly Turbid / S.022 / pH: x  Gluc: x / Ketone: Negative  / Bili: Negative / Urobili: <2 mg/dL   Blood: x / Protein: 100 mg/dL / Nitrite: Negative   Leuk Esterase: Large / RBC: 14 /HPF / WBC 78 /HPF   Sq Epi: x / Non Sq Epi: 1 /HPF / Bacteria: Many      Troponin T, Serum: 0.40 ng/mL <HH> (19 @ 00:43)  Troponin T, Serum: 0.43 ng/mL <HH> (19 @ 16:41)    CARDIAC MARKERS ( 22 Sep 2019 00:43 )  x     / 0.40 ng/mL / x     / x     / x      CARDIAC MARKERS ( 21 Sep 2019 16:41 )  x     / 0.43 ng/mL / x     / x     / x        PHYSICAL EXAM:  GEN: No acute distress  PULM/CHEST: Clear to auscultation bilaterally, no rales, rhonchi or wheezes   CVS: Regular rate and rhythm, S1-S2, no murmurs  ABD: Soft, non-tender, non-distended, +BS  EXT: No edema  NEURO: AAOx3    Castellon Catheter: IN

## 2019-09-22 NOTE — PROGRESS NOTE ADULT - SUBJECTIVE AND OBJECTIVE BOX
INTERVAL HISTORY: No overnight events.  	  MEDICATIONS:  amiodarone Infusion 0.5 mG/Min IV Continuous <Continuous>  aspirin  chewable 81 milliGRAM(s) Oral daily  atorvastatin 80 milliGRAM(s) Oral daily  chlorhexidine 4% Liquid 1 Application(s) Topical <User Schedule>  clopidogrel Tablet 75 milliGRAM(s) Oral daily  dextrose 40% Gel 15 Gram(s) Oral once PRN  dextrose 5%. 1000 milliLiter(s) IV Continuous <Continuous>  dextrose 50% Injectable 12.5 Gram(s) IV Push once  dextrose 50% Injectable 25 Gram(s) IV Push once  dextrose 50% Injectable 25 Gram(s) IV Push once  fentaNYL   Infusion. 0.5 MICROgram(s)/kG/Hr IV Continuous <Continuous>  furosemide   Injectable 40 milliGRAM(s) IV Push daily  glucagon  Injectable 1 milliGRAM(s) IntraMuscular once PRN  insulin regular Infusion 1 Unit(s)/Hr IV Continuous <Continuous>  meropenem  IVPB 1000 milliGRAM(s) IV Intermittent every 12 hours  meropenem  IVPB      morphine  - Injectable 2 milliGRAM(s) IV Push every 6 hours PRN  norepinephrine Infusion 0.05 MICROgram(s)/kG/Min IV Continuous <Continuous>  sodium bicarbonate 650 milliGRAM(s) Oral every 8 hours  vancomycin  IVPB      vancomycin  IVPB 1000 milliGRAM(s) IV Intermittent every 12 hours      REVIEW OF SYSTEMS:  CONSTITUTIONAL: No fever, weight loss, or fatigue  NECK: No pain or stiffness  RESPIRATORY: No cough, wheezing, chills or hemoptysis; No shortness of breath  CARDIOVASCULAR: No chest pain, palpitations, dizziness, or leg swelling  GASTROINTESTINAL: No abdominal or epigastric pain. No nausea, vomiting, or hematemesis; No diarrhea or constipation. No melena or hematochezia.  GENITOURINARY: No dysuria, frequency, hematuria, or incontinence  NEUROLOGICAL: No headaches, memory loss, loss of strength, numbness, or tremors  SKIN: No itching, burning, rashes, or lesions   ENDOCRINE: No heat or cold intolerance; No hair loss  MUSCULOSKELETAL: No joint pain or swelling; No muscle, back, or extremity pain  HEME/LYMPH: No easy bruising, or bleeding gums    PHYSICAL EXAM:  T(C): 36.6 (09-22-19 @ 08:00), Max: 36.7 (09-21-19 @ 21:31)  HR: 88 (09-22-19 @ 10:00) (72 - 156)  BP: 108/63 (09-22-19 @ 10:00) (76/50 - 123/66)  RR: 11 (09-22-19 @ 10:00) (11 - 44)  SpO2: 100% (09-22-19 @ 10:00) (80% - 100%)  Wt(kg): --  I&O's Summary    21 Sep 2019 07:01  -  22 Sep 2019 07:00  --------------------------------------------------------  IN: 164.7 mL / OUT: 1625 mL / NET: -1460.3 mL    22 Sep 2019 07:01  -  22 Sep 2019 10:51  --------------------------------------------------------  IN: 428.2 mL / OUT: 270 mL / NET: 158.2 mL      Height (cm): 160 (09-21 @ 23:30)  Weight (kg): 60.9 (09-21 @ 23:30)  BMI (kg/m2): 23.8 (09-21 @ 23:30)  BSA (m2): 1.63 (09-21 @ 23:30)    GENERAL: NAD, well-groomed, well-developed  HEAD:  Atraumatic, Normocephalic  NECK: Supple, No JVD, Normal thyroid  NERVOUS SYSTEM:  Alert & Oriented X3, Good concentration  CHEST/LUNG: Clear to percussion bilaterally; No rales, rhonchi, wheezing, or rubs  HEART: Regular rate and rhythm; No murmurs, rubs, or gallops  ABDOMEN: Soft, Nontender, Nondistended; Bowel sounds present  EXTREMITIES:  2+ Peripheral Pulses, No clubbing, cyanosis, or edema  SKIN: No rashes or lesions    LABS:	 	    CARDIAC MARKERS ( 22 Sep 2019 00:43 )  x     / 0.40 ng/mL / x     / x     / x      CARDIAC MARKERS ( 21 Sep 2019 16:41 )  x     / 0.43 ng/mL / x     / x     / x                                8.8    9.47  )-----------( 346      ( 22 Sep 2019 04:30 )             27.3     09-22    131<L>  |  100  |  57<H>  ----------------------------<  392<H>  5.0   |  18  |  1.2    Ca    9.6      22 Sep 2019 04:30  Mg     2.0     09-22    TPro  6.2  /  Alb  3.6  /  TBili  0.3  /  DBili  x   /  AST  86<H>  /  ALT  122<H>  /  AlkPhos  184<H>  09-22    proBNP: Serum Pro-Brain Natriuretic Peptide: 70030 pg/mL (09-21 @ 16:41)    Lipid Profile:

## 2019-09-22 NOTE — AIRWAY PLACEMENT NOTE ADULT - POST AIRWAY PLACEMENT ASSESSMENT:
breath sounds bilateral/breath sounds equal/chest excursion noted/positive end tidal CO2 noted/CXR pending

## 2019-09-22 NOTE — CONSULT NOTE ADULT - ASSESSMENT
IMPRESSION:    Acute respiratory failure  Cardiogenic shock  Acute HFrEF  Moderate to severe MR  NSTEMI?  RIC      PLAN:    CNS: Sedation as needed    HEENT: Oral care    PULMONARY:  HOB @ 45 degrees.  Vent changes as follows: Change FiO2 to 40%     CARDIOVASCULAR: I < O, Lasix 40 BID, Check CE, Check 2d echo    GI: GI prophylaxis.  Feeding     RENAL:  Follow up lytes.  Correct as needed. Replere HCO3 650 q8h. Urology consult    INFECTIOUS DISEASE: Follow up cultures. Vanc leonides for now    HEMATOLOGICAL:  DVT prophylaxis.    ENDOCRINE:  Follow up FS.    MUSCULOSKELETAL: Bedrest    MICU monitoring.    Overall prognosis poor IMPRESSION:    Acute respiratory failure  Pulmonary edema   Bilateral pleural effusion likely volume overload   Cardiogenic shock  Acute HFrEF  Moderate to severe MR  NSTEMI?  RIC      PLAN:    CNS: Sedation as needed    HEENT: Oral care    PULMONARY:  HOB @ 45 degrees.  Vent changes as follows: Change FiO2 to 40%     CARDIOVASCULAR: I < O, Lasix 40 BID, Check CE, Check 2d echo.  Cardiol FU     GI: GI prophylaxis.  OG Feeding     RENAL:  Follow up lytes.  Correct as needed. Replere HCO3 650 q8h. Urology consult    INFECTIOUS DISEASE: Follow up cultures.     HEMATOLOGICAL:  DVT prophylaxis.  AC per cardiology     ENDOCRINE:  Follow up FS.    MUSCULOSKELETAL: Bedrest    MICU monitoring.    Overall prognosis poor

## 2019-09-22 NOTE — CONSULT NOTE ADULT - SUBJECTIVE AND OBJECTIVE BOX
Patient is a 68y old  Female who presents with a chief complaint of sob (21 Sep 2019 22:46)      HPI:  68F with PMHx of uncontrolled DM (non-compliant), viral myopathy, Paroxysmal Afib, recent labial abscess s/p ID, hip fracture s/p fixation, recent LGIB bleed now presents for shortness of breath. Patient has been having shortness of breath for the last day. Occurs on exertion and stationary however no worsening of breathing when laying down. Patient denied abdominal pain however family reports she has been having some abdominal discomfort over the last day. Patient also has an indwelling montejo for the last 3 weeks for urinary retention. Reports cough with no sputum production. Denies chest pain, fever, nausea/vomiting, diarrhea. (21 Sep 2019 21:52)      PAST MEDICAL & SURGICAL HISTORY:  Female bladder prolapse  Diabetes  History of repair of hip fracture      SOCIAL HX:   Smoking  ex-smoker                       ETOH      social                      Other    FAMILY HISTORY:  FH: myocardial infarction  FH: stomach cancer  :  No known cardiovacular family hisotry     ROS:  See HPI     Allergies    No Known Allergies    Intolerances          PHYSICAL EXAM    ICU Vital Signs Last 24 Hrs  T(C): 36.6 (22 Sep 2019 08:00), Max: 36.7 (21 Sep 2019 21:31)  T(F): 97.8 (22 Sep 2019 08:00), Max: 98 (21 Sep 2019 21:31)  HR: 92 (22 Sep 2019 08:00) (72 - 156)  BP: 123/66 (22 Sep 2019 08:00) (76/50 - 123/66)  BP(mean): 83 (22 Sep 2019 08:00) (59 - 93)  RR: 11 (22 Sep 2019 08:00) (11 - 44)  SpO2: 99% (22 Sep 2019 08:21) (80% - 100%)      General: In NAD   HEENT:  Intubated          Lymphatic system: No cervical LN   Lungs: Bilateral basilar crackles  Cardiovascular: Regular  Gastrointestinal: Soft, Positive BS  Musculoskeletal: No clubbing.  Moves all extremities.  Full range of motion. b/l LE edema  Skin: Warm.  Intact  Neurological: No motor or sensory deficit       19 @ 07:01  -  19 @ 07:00  --------------------------------------------------------  IN:    amiodarone Infusion: 66.6 mL    fentaNYL Infusion.: 6.1 mL    insulin regular Infusion: 12 mL    IV PiggyBack: 50 mL    norepinephrine Infusion: 30 mL  Total IN: 164.7 mL    OUT:    Indwelling Catheter - Urethral: 1325 mL    Voided: 300 mL  Total OUT: 1625 mL    Total NET: -1460.3 mL      19 @ 07:01  -  19 @ 09:10  --------------------------------------------------------  IN:    amiodarone Infusion: 33.3 mL    fentaNYL Infusion.: 6.1 mL    insulin regular Infusion: 4 mL    IV PiggyBack: 250 mL    norepinephrine Infusion: 22.8 mL  Total IN: 316.2 mL    OUT:    Indwelling Catheter - Urethral: 150 mL  Total OUT: 150 mL    Total NET: 166.2 mL          LABS:                          8.8    9.47  )-----------( 346      ( 22 Sep 2019 04:30 )             27.3                                                   131<L>  |  100  |  57<H>  ----------------------------<  392<H>  5.0   |  18  |  1.2    Ca    9.6      22 Sep 2019 04:30  Mg     2.0         TPro  6.2  /  Alb  3.6  /  TBili  0.3  /  DBili  x   /  AST  86<H>  /  ALT  122<H>  /  AlkPhos  184<H>        PT/INR - ( 22 Sep 2019 04:30 )   PT: 12.80 sec;   INR: 1.11 ratio         PTT - ( 22 Sep 2019 04:30 )  PTT:27.0 sec                                       Urinalysis Basic - ( 21 Sep 2019 18:14 )    Color: Yellow / Appearance: Slightly Turbid / S.022 / pH: x  Gluc: x / Ketone: Negative  / Bili: Negative / Urobili: <2 mg/dL   Blood: x / Protein: 100 mg/dL / Nitrite: Negative   Leuk Esterase: Large / RBC: 14 /HPF / WBC 78 /HPF   Sq Epi: x / Non Sq Epi: 1 /HPF / Bacteria: Many        CARDIAC MARKERS ( 22 Sep 2019 00:43 )  x     / 0.40 ng/mL / x     / x     / x      CARDIAC MARKERS ( 21 Sep 2019 16:41 )  x     / 0.43 ng/mL / x     / x     / x        LIVER FUNCTIONS - ( 22 Sep 2019 04:30 )  Alb: 3.6 g/dL / Pro: 6.2 g/dL / ALK PHOS: 184 U/L / ALT: 122 U/L / AST: 86 U/L / GGT: x                                                                                               Mode: AC/ CMV (Assist Control/ Continuous Mandatory Ventilation)  RR (machine): 14  TV (machine): 450  FiO2: 50  PEEP: 5  ITime: 1  MAP: 11  PIP: 23                                      ABG - ( 22 Sep 2019 05:25 )  pH, Arterial: 7.29  pH, Blood: x     /  pCO2: 37    /  pO2: 295   / HCO3: 18    / Base Excess: -8.1  /  SaO2: x                   X-Rays      Bilateral interstitial opacities, ETT OK, OGT OK, LIJ OK                                                                               ECHO    MEDICATIONS  (STANDING):  amiodarone Infusion 1 mG/Min (33.333 mL/Hr) IV Continuous <Continuous>  aspirin  chewable 81 milliGRAM(s) Oral daily  atorvastatin 80 milliGRAM(s) Oral daily  chlorhexidine 4% Liquid 1 Application(s) Topical <User Schedule>  clopidogrel Tablet 75 milliGRAM(s) Oral daily  dextrose 5%. 1000 milliLiter(s) (50 mL/Hr) IV Continuous <Continuous>  dextrose 50% Injectable 12.5 Gram(s) IV Push once  dextrose 50% Injectable 25 Gram(s) IV Push once  dextrose 50% Injectable 25 Gram(s) IV Push once  fentaNYL   Infusion. 0.5 MICROgram(s)/kG/Hr (3.045 mL/Hr) IV Continuous <Continuous>  furosemide   Injectable 40 milliGRAM(s) IV Push daily  insulin glargine Injectable (LANTUS) 7 Unit(s) SubCutaneous at bedtime  insulin lispro (HumaLOG) corrective regimen sliding scale   SubCutaneous every 6 hours  insulin regular Infusion 1 Unit(s)/Hr (1 mL/Hr) IV Continuous <Continuous>  meropenem  IVPB 1000 milliGRAM(s) IV Intermittent every 12 hours  meropenem  IVPB      norepinephrine Infusion 0.05 MICROgram(s)/kG/Min (5.709 mL/Hr) IV Continuous <Continuous>  vancomycin  IVPB      vancomycin  IVPB 1000 milliGRAM(s) IV Intermittent every 12 hours    MEDICATIONS  (PRN):  dextrose 40% Gel 15 Gram(s) Oral once PRN Blood Glucose LESS THAN 70 milliGRAM(s)/deciliter  glucagon  Injectable 1 milliGRAM(s) IntraMuscular once PRN Glucose LESS THAN 70 milligrams/deciliter  morphine  - Injectable 2 milliGRAM(s) IV Push every 6 hours PRN Moderate Pain (4 - 6)

## 2019-09-23 LAB
ALBUMIN SERPL ELPH-MCNC: 3.4 G/DL — LOW (ref 3.5–5.2)
ALP SERPL-CCNC: 193 U/L — HIGH (ref 30–115)
ALT FLD-CCNC: 107 U/L — HIGH (ref 0–41)
ANION GAP SERPL CALC-SCNC: 14 MMOL/L — SIGNIFICANT CHANGE UP (ref 7–14)
APTT BLD: 60.5 SEC — HIGH (ref 27–39.2)
APTT BLD: 84.8 SEC — CRITICAL HIGH (ref 27–39.2)
AST SERPL-CCNC: 49 U/L — HIGH (ref 0–41)
B-OH-BUTYR SERPL-SCNC: 1.2 MMOL/L — HIGH
BASE EXCESS BLDA CALC-SCNC: -4.6 MMOL/L — LOW (ref -2–2)
BASE EXCESS BLDA CALC-SCNC: -7 MMOL/L — LOW (ref -2–2)
BASOPHILS # BLD AUTO: 0.08 K/UL — SIGNIFICANT CHANGE UP (ref 0–0.2)
BASOPHILS NFR BLD AUTO: 0.7 % — SIGNIFICANT CHANGE UP (ref 0–1)
BILIRUB SERPL-MCNC: 0.2 MG/DL — SIGNIFICANT CHANGE UP (ref 0.2–1.2)
BUN SERPL-MCNC: 47 MG/DL — HIGH (ref 10–20)
CALCIUM SERPL-MCNC: 9.9 MG/DL — SIGNIFICANT CHANGE UP (ref 8.5–10.1)
CHLORIDE SERPL-SCNC: 104 MMOL/L — SIGNIFICANT CHANGE UP (ref 98–110)
CO2 SERPL-SCNC: 19 MMOL/L — SIGNIFICANT CHANGE UP (ref 17–32)
CREAT SERPL-MCNC: 1 MG/DL — SIGNIFICANT CHANGE UP (ref 0.7–1.5)
CULTURE RESULTS: NO GROWTH — SIGNIFICANT CHANGE UP
EOSINOPHIL # BLD AUTO: 0.26 K/UL — SIGNIFICANT CHANGE UP (ref 0–0.7)
EOSINOPHIL NFR BLD AUTO: 2.2 % — SIGNIFICANT CHANGE UP (ref 0–8)
GAS PNL BLDA: SIGNIFICANT CHANGE UP
GLUCOSE BLDC GLUCOMTR-MCNC: 131 MG/DL — HIGH (ref 70–99)
GLUCOSE BLDC GLUCOMTR-MCNC: 133 MG/DL — HIGH (ref 70–99)
GLUCOSE BLDC GLUCOMTR-MCNC: 134 MG/DL — HIGH (ref 70–99)
GLUCOSE BLDC GLUCOMTR-MCNC: 134 MG/DL — HIGH (ref 70–99)
GLUCOSE BLDC GLUCOMTR-MCNC: 137 MG/DL — HIGH (ref 70–99)
GLUCOSE BLDC GLUCOMTR-MCNC: 139 MG/DL — HIGH (ref 70–99)
GLUCOSE BLDC GLUCOMTR-MCNC: 144 MG/DL — HIGH (ref 70–99)
GLUCOSE BLDC GLUCOMTR-MCNC: 148 MG/DL — HIGH (ref 70–99)
GLUCOSE BLDC GLUCOMTR-MCNC: 148 MG/DL — HIGH (ref 70–99)
GLUCOSE BLDC GLUCOMTR-MCNC: 166 MG/DL — HIGH (ref 70–99)
GLUCOSE BLDC GLUCOMTR-MCNC: 177 MG/DL — HIGH (ref 70–99)
GLUCOSE BLDC GLUCOMTR-MCNC: 76 MG/DL — SIGNIFICANT CHANGE UP (ref 70–99)
GLUCOSE SERPL-MCNC: 147 MG/DL — HIGH (ref 70–99)
HCO3 BLDA-SCNC: 19 MMOL/L — LOW (ref 23–27)
HCO3 BLDA-SCNC: 21 MMOL/L — LOW (ref 23–27)
HCT VFR BLD CALC: 30.4 % — LOW (ref 37–47)
HGB BLD-MCNC: 9.5 G/DL — LOW (ref 12–16)
HOROWITZ INDEX BLDA+IHG-RTO: 40 — SIGNIFICANT CHANGE UP
HOROWITZ INDEX BLDA+IHG-RTO: 40 — SIGNIFICANT CHANGE UP
IMM GRANULOCYTES NFR BLD AUTO: 0.4 % — HIGH (ref 0.1–0.3)
LYMPHOCYTES # BLD AUTO: 1.76 K/UL — SIGNIFICANT CHANGE UP (ref 1.2–3.4)
LYMPHOCYTES # BLD AUTO: 14.9 % — LOW (ref 20.5–51.1)
MAGNESIUM SERPL-MCNC: 2.1 MG/DL — SIGNIFICANT CHANGE UP (ref 1.8–2.4)
MCHC RBC-ENTMCNC: 26.1 PG — LOW (ref 27–31)
MCHC RBC-ENTMCNC: 31.3 G/DL — LOW (ref 32–37)
MCV RBC AUTO: 83.5 FL — SIGNIFICANT CHANGE UP (ref 81–99)
MONOCYTES # BLD AUTO: 0.93 K/UL — HIGH (ref 0.1–0.6)
MONOCYTES NFR BLD AUTO: 7.9 % — SIGNIFICANT CHANGE UP (ref 1.7–9.3)
NEUTROPHILS # BLD AUTO: 8.71 K/UL — HIGH (ref 1.4–6.5)
NEUTROPHILS NFR BLD AUTO: 73.9 % — SIGNIFICANT CHANGE UP (ref 42.2–75.2)
NRBC # BLD: 0 /100 WBCS — SIGNIFICANT CHANGE UP (ref 0–0)
PCO2 BLDA: 39 MMHG — SIGNIFICANT CHANGE UP (ref 38–42)
PCO2 BLDA: 40 MMHG — SIGNIFICANT CHANGE UP (ref 38–42)
PH BLDA: 7.28 — LOW (ref 7.38–7.42)
PH BLDA: 7.34 — LOW (ref 7.38–7.42)
PLATELET # BLD AUTO: 382 K/UL — SIGNIFICANT CHANGE UP (ref 130–400)
PO2 BLDA: 120 MMHG — HIGH (ref 78–95)
PO2 BLDA: 132 MMHG — HIGH (ref 78–95)
POTASSIUM SERPL-MCNC: 4.8 MMOL/L — SIGNIFICANT CHANGE UP (ref 3.5–5)
POTASSIUM SERPL-SCNC: 4.8 MMOL/L — SIGNIFICANT CHANGE UP (ref 3.5–5)
PROT SERPL-MCNC: 6.2 G/DL — SIGNIFICANT CHANGE UP (ref 6–8)
RBC # BLD: 3.64 M/UL — LOW (ref 4.2–5.4)
RBC # FLD: 14.4 % — SIGNIFICANT CHANGE UP (ref 11.5–14.5)
SAO2 % BLDA: 99 % — HIGH (ref 94–98)
SAO2 % BLDA: 99 % — HIGH (ref 94–98)
SODIUM SERPL-SCNC: 137 MMOL/L — SIGNIFICANT CHANGE UP (ref 135–146)
SPECIMEN SOURCE: SIGNIFICANT CHANGE UP
WBC # BLD: 11.79 K/UL — HIGH (ref 4.8–10.8)
WBC # FLD AUTO: 11.79 K/UL — HIGH (ref 4.8–10.8)

## 2019-09-23 PROCEDURE — 99233 SBSQ HOSP IP/OBS HIGH 50: CPT

## 2019-09-23 PROCEDURE — 71045 X-RAY EXAM CHEST 1 VIEW: CPT | Mod: 26

## 2019-09-23 PROCEDURE — 99291 CRITICAL CARE FIRST HOUR: CPT

## 2019-09-23 RX ORDER — PANTOPRAZOLE SODIUM 20 MG/1
40 TABLET, DELAYED RELEASE ORAL
Refills: 0 | Status: DISCONTINUED | OUTPATIENT
Start: 2019-09-23 | End: 2019-10-07

## 2019-09-23 RX ORDER — DIGOXIN 250 MCG
0.12 TABLET ORAL DAILY
Refills: 0 | Status: DISCONTINUED | OUTPATIENT
Start: 2019-09-23 | End: 2019-09-23

## 2019-09-23 RX ORDER — FUROSEMIDE 40 MG
40 TABLET ORAL EVERY 12 HOURS
Refills: 0 | Status: DISCONTINUED | OUTPATIENT
Start: 2019-09-23 | End: 2019-09-23

## 2019-09-23 RX ORDER — AMIODARONE HYDROCHLORIDE 400 MG/1
200 TABLET ORAL
Refills: 0 | Status: DISCONTINUED | OUTPATIENT
Start: 2019-09-23 | End: 2019-10-01

## 2019-09-23 RX ORDER — FUROSEMIDE 40 MG
20 TABLET ORAL EVERY 12 HOURS
Refills: 0 | Status: DISCONTINUED | OUTPATIENT
Start: 2019-09-23 | End: 2019-09-26

## 2019-09-23 RX ORDER — DIGOXIN 250 MCG
0.12 TABLET ORAL DAILY
Refills: 0 | Status: DISCONTINUED | OUTPATIENT
Start: 2019-09-23 | End: 2019-10-04

## 2019-09-23 RX ORDER — HEPARIN SODIUM 5000 [USP'U]/ML
5000 INJECTION INTRAVENOUS; SUBCUTANEOUS EVERY 8 HOURS
Refills: 0 | Status: DISCONTINUED | OUTPATIENT
Start: 2019-09-23 | End: 2019-09-25

## 2019-09-23 RX ADMIN — CHLORHEXIDINE GLUCONATE 15 MILLILITER(S): 213 SOLUTION TOPICAL at 05:02

## 2019-09-23 RX ADMIN — AMIODARONE HYDROCHLORIDE 200 MILLIGRAM(S): 400 TABLET ORAL at 17:33

## 2019-09-23 RX ADMIN — Medication 650 MILLIGRAM(S): at 05:02

## 2019-09-23 RX ADMIN — Medication 0.12 MILLIGRAM(S): at 22:49

## 2019-09-23 RX ADMIN — CLOPIDOGREL BISULFATE 75 MILLIGRAM(S): 75 TABLET, FILM COATED ORAL at 11:10

## 2019-09-23 RX ADMIN — HEPARIN SODIUM 12 UNIT(S)/HR: 5000 INJECTION INTRAVENOUS; SUBCUTANEOUS at 00:25

## 2019-09-23 RX ADMIN — CHLORHEXIDINE GLUCONATE 1 APPLICATION(S): 213 SOLUTION TOPICAL at 05:04

## 2019-09-23 RX ADMIN — INSULIN HUMAN 1 UNIT(S)/HR: 100 INJECTION, SOLUTION SUBCUTANEOUS at 00:24

## 2019-09-23 RX ADMIN — AMIODARONE HYDROCHLORIDE 200 MILLIGRAM(S): 400 TABLET ORAL at 00:32

## 2019-09-23 RX ADMIN — FENTANYL CITRATE 3.04 MICROGRAM(S)/KG/HR: 50 INJECTION INTRAVENOUS at 00:24

## 2019-09-23 RX ADMIN — Medication 100 MILLIGRAM(S): at 17:33

## 2019-09-23 RX ADMIN — Medication 650 MILLIGRAM(S): at 13:24

## 2019-09-23 RX ADMIN — Medication 650 MILLIGRAM(S): at 21:44

## 2019-09-23 RX ADMIN — ATORVASTATIN CALCIUM 80 MILLIGRAM(S): 80 TABLET, FILM COATED ORAL at 11:10

## 2019-09-23 RX ADMIN — Medication 81 MILLIGRAM(S): at 11:10

## 2019-09-23 RX ADMIN — SENNA PLUS 2 TABLET(S): 8.6 TABLET ORAL at 21:44

## 2019-09-23 RX ADMIN — Medication 0.25 MILLIGRAM(S): at 02:35

## 2019-09-23 RX ADMIN — HEPARIN SODIUM 5000 UNIT(S): 5000 INJECTION INTRAVENOUS; SUBCUTANEOUS at 21:44

## 2019-09-23 RX ADMIN — Medication 20 MILLIGRAM(S): at 17:33

## 2019-09-23 RX ADMIN — CHLORHEXIDINE GLUCONATE 15 MILLILITER(S): 213 SOLUTION TOPICAL at 17:33

## 2019-09-23 RX ADMIN — Medication 40 MILLIGRAM(S): at 05:02

## 2019-09-23 RX ADMIN — Medication 100 MILLIGRAM(S): at 05:02

## 2019-09-23 RX ADMIN — Medication 5.71 MICROGRAM(S)/KG/MIN: at 00:24

## 2019-09-23 NOTE — CONSULT NOTE ADULT - SUBJECTIVE AND OBJECTIVE BOX
Patient is a 68y old  Female who presents with a chief complaint of SOB (23 Sep 2019 08:44)    HPI:  Unable to obtain history from patient as she is intubated.  History obtained from chart:    68F with PMHx of uncontrolled DM (non-compliant), anxiety, Paroxysmal Afib s/p DCCV 16 years ago, recent labial abscess s/p ID, left femoral fracture s/p fixation one month ago, recent LGIB bleed now presents for shortness of breath.  Family reports that pt has been progressively getting sob for the past 2 weeks, and she reported noticing worsening bilateral lower extremity edema. Patient also has an indwelling montejo for the last 3 weeks for urinary retention.    In the ER pt was found to be hypoxic and was placed on BiPAP. CXR showed pulmonary congestion consistent with pulmonary edema. EKG showed anteroseptal Q waves and some ST depressions in lateral leads. Pt had no active chest pain.  Cardiac troponins elevated to 0.43 then 0.99.  Bedside echo showed moderate globally reduced EF. Pt. was started on medical therapy for NSTEMI, and started on treatment for acute decompensated heart failure.    Over the weekend, pt was intubated.  She was treated for pulmonary edema with lasix and milrinone but she went into afib with RVR and so Milrinone was stopped.  She was started on amiodarone gtt and given amiodarone 200 mg po x 1 this morning.  She was also started on Digoxin 0.25 mg IV - she has received 4 doses.  Pt was also hypotensive and so she was started on levophed.  A transthoracic echo showed an EF <20%.  Pt's EF in 2019 was 55-60%.  Pt's daughter says her EF was in low 16 years ago but improved after she had a cardioversion for afib.    Pt has a history of anxiety and per the daughter, takes benzos daily.  Pt is intubated but she is wide awake despite Fentanyl gtt    Pt is currently in NSR      PAST MEDICAL & SURGICAL HISTORY:  Female bladder prolapse  Diabetes  History of repair of hip fracture  Afib  urinary retention  Anxiety    PREVIOUS DIAGNOSTIC TESTING:      ECHO  FINDINGS:  Transthoracic Echocardiogram (19 @ 09:11) >  Summary:   1. Left ventricular ejection fraction, by visual estimation, is 55 to   60%.   2. LV Ejection Fraction by Renner's Method with a biplane EF of 58 %.   3. Mildly increased LV wall thickness.   4. Spectral Doppler shows impaired relaxation pattern of left   ventricular myocardial filling (Grade I diastolic dysfunction).   5. Normal right atrial size.   6. Trivial pericardial effusion.   7. Mild mitral annular calcification.   8. Mild mitral valve regurgitation.   9. Mild thickening of the anterior and posterior mitral valve leaflets.  10. Mild aortic valve stenosis.  11. Peak transaortic gradient equals 27.0 mmHg, mean transaortic gradient   equals 10.9 mmHg, the calculated aortic valve area equals 1.34 cm² by the   continuity equation consistent with moderate aortic stenosis.    Transthoracic Echocardiogram (19 @ 06:53) >   1. Left ventricular ejection fraction, by visual estimation, is <20%.   2. LV Ejection Fraction by Renner's Method with a biplane EF of 18 %.   3. Severely decreased global left ventricular systolic function.   4. Spectral Doppler shows pseudonormal pattern of left ventricular   myocardial filling (Grade II diastolic dysfunction).   5. The mitral valve leaflets are tethered due to reduced systolic   function and elevated LVDP.   6. Moderate-to-severe mitral regurgitation.   7. Sclerotic aortic valve with normal opening.   8. The aortic valve mean gradient is 9.9 mmHg consistent with normally   opening aortic valve.   9. Estimated pulmonary artery systolic pressure is 37.5 mmHg assuming a   right atrial pressure of 15 mmHg, which is consistent with borderline   pulmonary hypertension.  10. LA volume Index is 73.0 ml/m² ml/m2.  11. Bilateral pleural effusions.      MEDICATIONS  (STANDING):  amiodarone    Tablet 200 milliGRAM(s) Oral two times a day  amiodarone Infusion 0.5 mG/Min (16.667 mL/Hr) IV Continuous <Continuous>  aspirin  chewable 81 milliGRAM(s) Oral daily  atorvastatin 80 milliGRAM(s) Oral daily  chlorhexidine 0.12% Liquid 15 milliLiter(s) Oral Mucosa two times a day  chlorhexidine 4% Liquid 1 Application(s) Topical <User Schedule>  clopidogrel Tablet 75 milliGRAM(s) Oral daily  dextrose 5%. 1000 milliLiter(s) (50 mL/Hr) IV Continuous <Continuous>  dextrose 50% Injectable 12.5 Gram(s) IV Push once  dextrose 50% Injectable 25 Gram(s) IV Push once  dextrose 50% Injectable 25 Gram(s) IV Push once  docusate sodium Liquid 100 milliGRAM(s) Oral two times a day  fentaNYL   Infusion. 0.5 MICROgram(s)/kG/Hr (3.045 mL/Hr) IV Continuous <Continuous>  furosemide   Injectable 40 milliGRAM(s) IV Push every 12 hours  heparin  Infusion 1200 Unit(s)/Hr (12 mL/Hr) IV Continuous <Continuous>  insulin regular Infusion 1 Unit(s)/Hr (1 mL/Hr) IV Continuous <Continuous>  norepinephrine Infusion 0.05 MICROgram(s)/kG/Min (5.709 mL/Hr) IV Continuous <Continuous>  senna 2 Tablet(s) Oral at bedtime  sodium bicarbonate 650 milliGRAM(s) Oral every 8 hours    MEDICATIONS  (PRN):  dextrose 40% Gel 15 Gram(s) Oral once PRN Blood Glucose LESS THAN 70 milliGRAM(s)/deciliter  glucagon  Injectable 1 milliGRAM(s) IntraMuscular once PRN Glucose LESS THAN 70 milligrams/deciliter  morphine  - Injectable 2 milliGRAM(s) IV Push every 6 hours PRN Moderate Pain (4 - 6)      FAMILY HISTORY:  FH: myocardial infarction  FH: stomach cancer      SOCIAL HISTORY:    CIGARETTES: denies    ALCOHOL: denies    Past Surgical History:    Allergies:    No Known Allergies      REVIEW OF SYSTEMS:    Limited as pt is intubated, but pt denies CP, palpitations    Vital Signs Last 24 Hrs  T(C): 37.5 (23 Sep 2019 11:52), Max: 37.7 (23 Sep 2019 00:00)  T(F): 99.5 (23 Sep 2019 11:52), Max: 99.8 (23 Sep 2019 00:00)  HR: 112 (23 Sep 2019 11:52) (90 - 146)  BP: 93/66 (23 Sep 2019 11:52) (78/47 - 119/69)  BP(mean): 76 (23 Sep 2019 11:52) (55 - 87)  RR: 18 (23 Sep 2019 11:52) (12 - 23)  SpO2: 100% (23 Sep 2019 11:52) (97% - 100%)    PHYSICAL EXAM:    GENERAL: In no apparent distress, well nourished, and hydrated.  HEENT: + ETT  NECK: Supple, Left IJ central line  HEART:  regular rate, tachy at 112  PULMONARY: Clear to auscultation and perfusion.  No rales, wheezing, or rhonchi bilaterally.  ABDOMEN: Soft, Nontender, Nondistended; Bowel sounds present  EXTREMITIES:  2+ Peripheral Pulses, No clubbing, cyanosis, or edema  NEUROLOGICAL: Grossly nonfocal.  RASS 0    INTERPRETATION OF TELEMETRY:    EC Lead ECG (19 @ 14:41) >  Ventricular Rate 155 BPM    Atrial Rate 155 BPM    QRS Duration 114 ms    Q-T Interval 324 ms    QTC Calculation(Bezet) 520 ms    R Axis -47 degrees    T Axis 120 degrees    Diagnosis Line Supraventricular tachycardia  Left anterior fascicular block  Anteroseptal infarct , age undetermined  Abnormal ECG      I&O's Detail    22 Sep 2019 07:  -  23 Sep 2019 07:00  --------------------------------------------------------  IN:    amiodarone Infusion: 66.6 mL    amiodarone Infusion: 300.6 mL    fentaNYL Infusion.: 272.8 mL    heparin Infusion: 240 mL    insulin regular Infusion: 54 mL    IV PiggyBack: 250 mL    milrinone  Infusion: 5.8 mL    norepinephrine Infusion: 493.8 mL  Total IN: 1683.6 mL    OUT:    Indwelling Catheter - Urethral: 1345 mL  Total OUT: 1345 mL    Total NET: 338.6 mL      23 Sep 2019 07:  -  23 Sep 2019 12:16  --------------------------------------------------------  IN:    fentaNYL Infusion.: 31.8 mL    heparin Infusion: 60 mL    insulin regular Infusion: 7 mL    norepinephrine Infusion: 22 mL  Total IN: 120.8 mL    OUT:    Indwelling Catheter - Urethral: 105 mL  Total OUT: 105 mL    Total NET: 15.8 mL          LABS:                        9.5    11.79 )-----------( 382      ( 23 Sep 2019 05:30 )             30.4         137  |  104  |  47<H>  ----------------------------<  147<H>  4.8   |  19  |  1.0    Ca    9.9      23 Sep 2019 05:30  Mg     2.1         TPro  6.2  /  Alb  3.4<L>  /  TBili  0.2  /  DBili  x   /  AST  49<H>  /  ALT  107<H>  /  AlkPhos  193<H>      CARDIAC MARKERS ( 22 Sep 2019 11:30 )  x     / 0.99 ng/mL / x     / x     / x      CARDIAC MARKERS ( 22 Sep 2019 00:43 )  x     / 0.40 ng/mL / x     / x     / x      CARDIAC MARKERS ( 21 Sep 2019 16:41 )  x     / 0.43 ng/mL / x     / x     / x          PT/INR - ( 22 Sep 2019 04:30 )   PT: 12.80 sec;   INR: 1.11 ratio         PTT - ( 23 Sep 2019 05:30 )  PTT:84.8 sec  Urinalysis Basic - ( 21 Sep 2019 18:14 )    Color: Yellow / Appearance: Slightly Turbid / S.022 / pH: x  Gluc: x / Ketone: Negative  / Bili: Negative / Urobili: <2 mg/dL   Blood: x / Protein: 100 mg/dL / Nitrite: Negative   Leuk Esterase: Large / RBC: 14 /HPF / WBC 78 /HPF   Sq Epi: x / Non Sq Epi: 1 /HPF / Bacteria: Many      BNP  I&O's Detail    22 Sep 2019 07:  -  23 Sep 2019 07:00  --------------------------------------------------------  IN:    amiodarone Infusion: 66.6 mL    amiodarone Infusion: 300.6 mL    fentaNYL Infusion.: 272.8 mL    heparin Infusion: 240 mL    insulin regular Infusion: 54 mL    IV PiggyBack: 250 mL    milrinone  Infusion: 5.8 mL    norepinephrine Infusion: 493.8 mL  Total IN: 1683.6 mL    OUT:    Indwelling Catheter - Urethral: 1345 mL  Total OUT: 1345 mL    Total NET: 338.6 mL      23 Sep 2019 07:  -  23 Sep 2019 12:16  --------------------------------------------------------  IN:    fentaNYL Infusion.: 31.8 mL    heparin Infusion: 60 mL    insulin regular Infusion: 7 mL    norepinephrine Infusion: 22 mL  Total IN: 120.8 mL    OUT:    Indwelling Catheter - Urethral: 105 mL  Total OUT: 105 mL    Total NET: 15.8 mL        Daily     Daily Weight in k.2 (23 Sep 2019 06:00)    RADIOLOGY & ADDITIONAL STUDIES:  Xray Chest 1 View-PORTABLE IMMEDIATE (19 @ 05:03) >  Impression:      Stable pulmonary vascular congestion and small pleural effusions.    Support apparatus as above. Patient is a 68y old  Female who presents with a chief complaint of SOB (23 Sep 2019 08:44)    HPI:  Unable to obtain history from patient as she is intubated.  History obtained from chart:    68F with PMHx of uncontrolled DM (non-compliant), anxiety, Paroxysmal Afib s/p DCCV 16 years ago, recent labial abscess s/p ID, left femoral fracture s/p fixation one month ago, recent LGIB bleed now presents for shortness of breath.  Family reports that pt has been progressively getting sob for the past 2 weeks, and she reported noticing worsening bilateral lower extremity edema. Patient also has an indwelling montejo for the last 3 weeks for urinary retention.    In the ER pt was found to be hypoxic and was placed on BiPAP. CXR showed pulmonary congestion consistent with pulmonary edema. EKG showed anteroseptal Q waves and some ST depressions in lateral leads. Pt had no active chest pain.  Cardiac troponins elevated to 0.43 then 0.99.  Bedside echo showed moderate globally reduced EF. Pt. was started on medical therapy for NSTEMI, and started on treatment for acute decompensated heart failure.    Over the weekend, pt was intubated.  She was treated for pulmonary edema with lasix and milrinone but she went into afib with RVR shortly after Milrinone was started and so Milrinone was stopped. Pt was in afib for about 2 hours.  She converted to NSR at 1305 yesterday. She was started on amiodarone gtt and given amiodarone 200 mg po x 1 this morning.  She was also started on Digoxin 0.25 mg IV - she has received 4 doses.  Pt was also hypotensive and so she was started on levophed.  A transthoracic echo showed an EF <20%.  Pt's EF in 2019 was 55-60%.  Pt's daughter says her EF was in low 16 years ago but improved after she had a cardioversion for afib.    Pt has a history of anxiety and per the daughter, takes benzos daily.  Pt is intubated but she is wide awake despite Fentanyl gtt    Pt is currently in NSR at 112 BPM      PAST MEDICAL & SURGICAL HISTORY:  Female bladder prolapse  Diabetes  History of repair of hip fracture  Afib  urinary retention  Anxiety    PREVIOUS DIAGNOSTIC TESTING:      ECHO  FINDINGS:  Transthoracic Echocardiogram (19 @ 09:11) >  Summary:   1. Left ventricular ejection fraction, by visual estimation, is 55 to   60%.   2. LV Ejection Fraction by Renner's Method with a biplane EF of 58 %.   3. Mildly increased LV wall thickness.   4. Spectral Doppler shows impaired relaxation pattern of left   ventricular myocardial filling (Grade I diastolic dysfunction).   5. Normal right atrial size.   6. Trivial pericardial effusion.   7. Mild mitral annular calcification.   8. Mild mitral valve regurgitation.   9. Mild thickening of the anterior and posterior mitral valve leaflets.  10. Mild aortic valve stenosis.  11. Peak transaortic gradient equals 27.0 mmHg, mean transaortic gradient   equals 10.9 mmHg, the calculated aortic valve area equals 1.34 cm² by the   continuity equation consistent with moderate aortic stenosis.    Transthoracic Echocardiogram (19 @ 06:53) >   1. Left ventricular ejection fraction, by visual estimation, is <20%.   2. LV Ejection Fraction by Renner's Method with a biplane EF of 18 %.   3. Severely decreased global left ventricular systolic function.   4. Spectral Doppler shows pseudonormal pattern of left ventricular   myocardial filling (Grade II diastolic dysfunction).   5. The mitral valve leaflets are tethered due to reduced systolic   function and elevated LVDP.   6. Moderate-to-severe mitral regurgitation.   7. Sclerotic aortic valve with normal opening.   8. The aortic valve mean gradient is 9.9 mmHg consistent with normally   opening aortic valve.   9. Estimated pulmonary artery systolic pressure is 37.5 mmHg assuming a   right atrial pressure of 15 mmHg, which is consistent with borderline   pulmonary hypertension.  10. LA volume Index is 73.0 ml/m² ml/m2.  11. Bilateral pleural effusions.      MEDICATIONS  (STANDING):  amiodarone    Tablet 200 milliGRAM(s) Oral two times a day  amiodarone Infusion 0.5 mG/Min (16.667 mL/Hr) IV Continuous <Continuous>  aspirin  chewable 81 milliGRAM(s) Oral daily  atorvastatin 80 milliGRAM(s) Oral daily  chlorhexidine 0.12% Liquid 15 milliLiter(s) Oral Mucosa two times a day  chlorhexidine 4% Liquid 1 Application(s) Topical <User Schedule>  clopidogrel Tablet 75 milliGRAM(s) Oral daily  dextrose 5%. 1000 milliLiter(s) (50 mL/Hr) IV Continuous <Continuous>  dextrose 50% Injectable 12.5 Gram(s) IV Push once  dextrose 50% Injectable 25 Gram(s) IV Push once  dextrose 50% Injectable 25 Gram(s) IV Push once  docusate sodium Liquid 100 milliGRAM(s) Oral two times a day  fentaNYL   Infusion. 0.5 MICROgram(s)/kG/Hr (3.045 mL/Hr) IV Continuous <Continuous>  furosemide   Injectable 40 milliGRAM(s) IV Push every 12 hours  heparin  Infusion 1200 Unit(s)/Hr (12 mL/Hr) IV Continuous <Continuous>  insulin regular Infusion 1 Unit(s)/Hr (1 mL/Hr) IV Continuous <Continuous>  norepinephrine Infusion 0.05 MICROgram(s)/kG/Min (5.709 mL/Hr) IV Continuous <Continuous>  senna 2 Tablet(s) Oral at bedtime  sodium bicarbonate 650 milliGRAM(s) Oral every 8 hours    MEDICATIONS  (PRN):  dextrose 40% Gel 15 Gram(s) Oral once PRN Blood Glucose LESS THAN 70 milliGRAM(s)/deciliter  glucagon  Injectable 1 milliGRAM(s) IntraMuscular once PRN Glucose LESS THAN 70 milligrams/deciliter  morphine  - Injectable 2 milliGRAM(s) IV Push every 6 hours PRN Moderate Pain (4 - 6)      FAMILY HISTORY:  FH: myocardial infarction  FH: stomach cancer      SOCIAL HISTORY:    CIGARETTES: denies    ALCOHOL: denies    Past Surgical History:    Allergies:    No Known Allergies      REVIEW OF SYSTEMS:    Limited as pt is intubated, but pt denies CP, palpitations    Vital Signs Last 24 Hrs  T(C): 37.5 (23 Sep 2019 11:52), Max: 37.7 (23 Sep 2019 00:00)  T(F): 99.5 (23 Sep 2019 11:52), Max: 99.8 (23 Sep 2019 00:00)  HR: 112 (23 Sep 2019 11:52) (90 - 146)  BP: 93/66 (23 Sep 2019 11:52) (78/47 - 119/69)  BP(mean): 76 (23 Sep 2019 11:52) (55 - 87)  RR: 18 (23 Sep 2019 11:52) (12 - 23)  SpO2: 100% (23 Sep 2019 11:52) (97% - 100%)    PHYSICAL EXAM:    GENERAL: In no apparent distress, well nourished, and hydrated.  HEENT: + ETT  NECK: Supple, Left IJ central line  HEART:  regular rate, tachy at 112  PULMONARY: Clear to auscultation and perfusion.  No rales, wheezing, or rhonchi bilaterally.  ABDOMEN: Soft, Nontender, Nondistended; Bowel sounds present  EXTREMITIES:  2+ Peripheral Pulses, No clubbing, cyanosis, or edema  NEUROLOGICAL: Grossly nonfocal.  RASS 0    INTERPRETATION OF TELEMETRY:    EC Lead ECG (19 @ 14:41) >  Ventricular Rate 155 BPM    Atrial Rate 155 BPM    QRS Duration 114 ms    Q-T Interval 324 ms    QTC Calculation(Bezet) 520 ms    R Axis -47 degrees    T Axis 120 degrees    Diagnosis Line Supraventricular tachycardia  Left anterior fascicular block  Anteroseptal infarct , age undetermined  Abnormal ECG      I&O's Detail    22 Sep 2019 07:  -  23 Sep 2019 07:00  --------------------------------------------------------  IN:    amiodarone Infusion: 66.6 mL    amiodarone Infusion: 300.6 mL    fentaNYL Infusion.: 272.8 mL    heparin Infusion: 240 mL    insulin regular Infusion: 54 mL    IV PiggyBack: 250 mL    milrinone  Infusion: 5.8 mL    norepinephrine Infusion: 493.8 mL  Total IN: 1683.6 mL    OUT:    Indwelling Catheter - Urethral: 1345 mL  Total OUT: 1345 mL    Total NET: 338.6 mL      23 Sep 2019 07:  -  23 Sep 2019 12:16  --------------------------------------------------------  IN:    fentaNYL Infusion.: 31.8 mL    heparin Infusion: 60 mL    insulin regular Infusion: 7 mL    norepinephrine Infusion: 22 mL  Total IN: 120.8 mL    OUT:    Indwelling Catheter - Urethral: 105 mL  Total OUT: 105 mL    Total NET: 15.8 mL          LABS:                        9.5    11.79 )-----------( 382      ( 23 Sep 2019 05:30 )             30.4         137  |  104  |  47<H>  ----------------------------<  147<H>  4.8   |  19  |  1.0    Ca    9.9      23 Sep 2019 05:30  Mg     2.1         TPro  6.2  /  Alb  3.4<L>  /  TBili  0.2  /  DBili  x   /  AST  49<H>  /  ALT  107<H>  /  AlkPhos  193<H>      CARDIAC MARKERS ( 22 Sep 2019 11:30 )  x     / 0.99 ng/mL / x     / x     / x      CARDIAC MARKERS ( 22 Sep 2019 00:43 )  x     / 0.40 ng/mL / x     / x     / x      CARDIAC MARKERS ( 21 Sep 2019 16:41 )  x     / 0.43 ng/mL / x     / x     / x          PT/INR - ( 22 Sep 2019 04:30 )   PT: 12.80 sec;   INR: 1.11 ratio         PTT - ( 23 Sep 2019 05:30 )  PTT:84.8 sec  Urinalysis Basic - ( 21 Sep 2019 18:14 )    Color: Yellow / Appearance: Slightly Turbid / S.022 / pH: x  Gluc: x / Ketone: Negative  / Bili: Negative / Urobili: <2 mg/dL   Blood: x / Protein: 100 mg/dL / Nitrite: Negative   Leuk Esterase: Large / RBC: 14 /HPF / WBC 78 /HPF   Sq Epi: x / Non Sq Epi: 1 /HPF / Bacteria: Many      BNP  I&O's Detail    22 Sep 2019 07:  -  23 Sep 2019 07:00  --------------------------------------------------------  IN:    amiodarone Infusion: 66.6 mL    amiodarone Infusion: 300.6 mL    fentaNYL Infusion.: 272.8 mL    heparin Infusion: 240 mL    insulin regular Infusion: 54 mL    IV PiggyBack: 250 mL    milrinone  Infusion: 5.8 mL    norepinephrine Infusion: 493.8 mL  Total IN: 1683.6 mL    OUT:    Indwelling Catheter - Urethral: 1345 mL  Total OUT: 1345 mL    Total NET: 338.6 mL      23 Sep 2019 07:  -  23 Sep 2019 12:16  --------------------------------------------------------  IN:    fentaNYL Infusion.: 31.8 mL    heparin Infusion: 60 mL    insulin regular Infusion: 7 mL    norepinephrine Infusion: 22 mL  Total IN: 120.8 mL    OUT:    Indwelling Catheter - Urethral: 105 mL  Total OUT: 105 mL    Total NET: 15.8 mL        Daily     Daily Weight in k.2 (23 Sep 2019 06:00)    RADIOLOGY & ADDITIONAL STUDIES:  Xray Chest 1 View-PORTABLE IMMEDIATE (19 @ 05:03) >  Impression:      Stable pulmonary vascular congestion and small pleural effusions.    Support apparatus as above. Patient is a 68y old  Female who presents with a chief complaint of SOB (23 Sep 2019 08:44)    HPI:  Unable to obtain history from patient as she is intubated.  History obtained from chart:    68F with PMHx of uncontrolled DM (non-compliant), anxiety, Paroxysmal Afib s/p DCCV 16 years ago, recent labial abscess s/p ID, left femoral fracture s/p fixation one month ago, recent LGIB bleed now presents for shortness of breath.  Family reports that pt has been progressively getting sob for the past 2 weeks, and she reported noticing worsening bilateral lower extremity edema. Patient also has an indwelling montejo for the last 3 weeks for urinary retention.    In the ER pt was found to be hypoxic and was placed on BiPAP. CXR showed pulmonary congestion consistent with pulmonary edema. EKG showed anteroseptal Q waves and some ST depressions in lateral leads. Pt had no active chest pain.  Cardiac troponins elevated to 0.43 then 0.99.  Bedside echo showed moderate globally reduced EF. Pt. was started on medical therapy for NSTEMI, and started on treatment for acute decompensated heart failure.    Over the weekend, pt was intubated.  She was treated for pulmonary edema with lasix and milrinone but she went into afib with RVR shortly after Milrinone was started and so Milrinone was stopped. Pt was in afib for about 2 hours.  She converted to NSR at 1305 yesterday. She was started on amiodarone gtt and given amiodarone 200 mg po x 1 this morning.  She was also started on Digoxin 0.25 mg IV - she has received 4 doses.  Pt was also hypotensive and so she was started on levophed.  A transthoracic echo showed an EF <20%.  Pt's EF in 2019 was 55-60%.  Pt's daughter says her EF was in low 16 years ago but improved after she had a cardioversion for afib.    Pt has a history of anxiety and per the daughter, takes benzos daily.  Pt is intubated but she is wide awake despite Fentanyl gtt    Pt is currently in SR at 112 BPM      PAST MEDICAL & SURGICAL HISTORY:  Female bladder prolapse  Diabetes  History of repair of hip fracture  Afib  urinary retention  Anxiety    PREVIOUS DIAGNOSTIC TESTING:    ECHO FINDINGS:  Transthoracic Echocardiogram (19 @ 09:11) >  Summary:   1. Left ventricular ejection fraction, by visual estimation, is 55 to 60%.   2. LV Ejection Fraction by Renner's Method with a biplane EF of 58 %.   3. Mildly increased LV wall thickness.   4. Spectral Doppler shows impaired relaxation pattern of left   ventricular myocardial filling (Grade I diastolic dysfunction).   5. Normal right atrial size.   6. Trivial pericardial effusion.   7. Mild mitral annular calcification.   8. Mild mitral valve regurgitation.   9. Mild thickening of the anterior and posterior mitral valve leaflets.  10. Mild aortic valve stenosis.  11. Peak transaortic gradient equals 27.0 mmHg, mean transaortic gradient   equals 10.9 mmHg, the calculated aortic valve area equals 1.34 cm² by the   continuity equation consistent with moderate aortic stenosis.    Transthoracic Echocardiogram (19 @ 06:53) >   1. Left ventricular ejection fraction, by visual estimation, is <20%.   2. LV Ejection Fraction by Renner's Method with a biplane EF of 18 %.   3. Severely decreased global left ventricular systolic function.   4. Spectral Doppler shows pseudonormal pattern of left ventricular myocardial filling (Grade II diastolic dysfunction).   5. The mitral valve leaflets are tethered due to reduced systolic   function and elevated LVDP.   6. Moderate-to-severe mitral regurgitation.   7. Sclerotic aortic valve with normal opening.   8. The aortic valve mean gradient is 9.9 mmHg consistent with normally   opening aortic valve.   9. Estimated pulmonary artery systolic pressure is 37.5 mmHg assuming a right atrial pressure of 15 mmHg, which is consistent with borderline pulmonary hypertension.  10. LA volume Index is 73.0 ml/m² ml/m2.  11. Bilateral pleural effusions.      MEDICATIONS  (STANDING):  amiodarone    Tablet 200 milliGRAM(s) Oral two times a day  amiodarone Infusion 0.5 mG/Min (16.667 mL/Hr) IV Continuous <Continuous>  aspirin  chewable 81 milliGRAM(s) Oral daily  atorvastatin 80 milliGRAM(s) Oral daily  chlorhexidine 0.12% Liquid 15 milliLiter(s) Oral Mucosa two times a day  chlorhexidine 4% Liquid 1 Application(s) Topical <User Schedule>  clopidogrel Tablet 75 milliGRAM(s) Oral daily  dextrose 5%. 1000 milliLiter(s) (50 mL/Hr) IV Continuous <Continuous>  dextrose 50% Injectable 12.5 Gram(s) IV Push once  dextrose 50% Injectable 25 Gram(s) IV Push once  dextrose 50% Injectable 25 Gram(s) IV Push once  docusate sodium Liquid 100 milliGRAM(s) Oral two times a day  fentaNYL   Infusion. 0.5 MICROgram(s)/kG/Hr (3.045 mL/Hr) IV Continuous <Continuous>  furosemide   Injectable 40 milliGRAM(s) IV Push every 12 hours  heparin  Infusion 1200 Unit(s)/Hr (12 mL/Hr) IV Continuous <Continuous>  insulin regular Infusion 1 Unit(s)/Hr (1 mL/Hr) IV Continuous <Continuous>  norepinephrine Infusion 0.05 MICROgram(s)/kG/Min (5.709 mL/Hr) IV Continuous <Continuous>  senna 2 Tablet(s) Oral at bedtime  sodium bicarbonate 650 milliGRAM(s) Oral every 8 hours    MEDICATIONS  (PRN):  dextrose 40% Gel 15 Gram(s) Oral once PRN Blood Glucose LESS THAN 70 milliGRAM(s)/deciliter  glucagon  Injectable 1 milliGRAM(s) IntraMuscular once PRN Glucose LESS THAN 70 milligrams/deciliter  morphine  - Injectable 2 milliGRAM(s) IV Push every 6 hours PRN Moderate Pain (4 - 6)      FAMILY HISTORY:  FH: myocardial infarction  FH: stomach cancer      SOCIAL HISTORY:    CIGARETTES: denies    ALCOHOL: denies    Past Surgical History:    Allergies:    No Known Allergies      REVIEW OF SYSTEMS:    Limited as pt is intubated, but pt denies CP, palpitations    Vital Signs Last 24 Hrs  T(C): 37.5 (23 Sep 2019 11:52), Max: 37.7 (23 Sep 2019 00:00)  T(F): 99.5 (23 Sep 2019 11:52), Max: 99.8 (23 Sep 2019 00:00)  HR: 112 (23 Sep 2019 11:52) (90 - 146)  BP: 93/66 (23 Sep 2019 11:52) (78/47 - 119/69)  BP(mean): 76 (23 Sep 2019 11:52) (55 - 87)  RR: 18 (23 Sep 2019 11:52) (12 - 23)  SpO2: 100% (23 Sep 2019 11:52) (97% - 100%)    PHYSICAL EXAM:    GENERAL: In no apparent distress, well nourished, and hydrated.  HEENT: + ETT  NECK: Supple, Left IJ central line  HEART:  regular rate, tachy at 112  PULMONARY: Clear to auscultation and perfusion.  No rales, wheezing, or rhonchi bilaterally.  ABDOMEN: Soft, Nontender, Nondistended; Bowel sounds present  EXTREMITIES:  2+ Peripheral Pulses, No clubbing, cyanosis, or edema  NEUROLOGICAL: Grossly nonfocal.  RASS 0    INTERPRETATION OF TELEMETRY:    EC Lead ECG (19 @ 14:41) >  Ventricular Rate 155 BPM    Atrial Rate 155 BPM    QRS Duration 114 ms    Q-T Interval 324 ms    QTC Calculation(Bezet) 520 ms    R Axis -47 degrees    T Axis 120 degrees    Diagnosis Line Supraventricular tachycardia  Left anterior fascicular block  Anteroseptal infarct , age undetermined  Abnormal ECG      I&O's Detail    22 Sep 2019 07:  -  23 Sep 2019 07:00  --------------------------------------------------------  IN:    amiodarone Infusion: 66.6 mL    amiodarone Infusion: 300.6 mL    fentaNYL Infusion.: 272.8 mL    heparin Infusion: 240 mL    insulin regular Infusion: 54 mL    IV PiggyBack: 250 mL    milrinone  Infusion: 5.8 mL    norepinephrine Infusion: 493.8 mL  Total IN: 1683.6 mL    OUT:    Indwelling Catheter - Urethral: 1345 mL  Total OUT: 1345 mL    Total NET: 338.6 mL      23 Sep 2019 07:  -  23 Sep 2019 12:16  --------------------------------------------------------  IN:    fentaNYL Infusion.: 31.8 mL    heparin Infusion: 60 mL    insulin regular Infusion: 7 mL    norepinephrine Infusion: 22 mL  Total IN: 120.8 mL    OUT:    Indwelling Catheter - Urethral: 105 mL  Total OUT: 105 mL    Total NET: 15.8 mL          LABS:                        9.5    11.79 )-----------( 382      ( 23 Sep 2019 05:30 )             30.4         137  |  104  |  47<H>  ----------------------------<  147<H>  4.8   |  19  |  1.0    Ca    9.9      23 Sep 2019 05:30  Mg     2.1         TPro  6.2  /  Alb  3.4<L>  /  TBili  0.2  /  DBili  x   /  AST  49<H>  /  ALT  107<H>  /  AlkPhos  193<H>      CARDIAC MARKERS ( 22 Sep 2019 11:30 )  x     / 0.99 ng/mL / x     / x     / x      CARDIAC MARKERS ( 22 Sep 2019 00:43 )  x     / 0.40 ng/mL / x     / x     / x      CARDIAC MARKERS ( 21 Sep 2019 16:41 )  x     / 0.43 ng/mL / x     / x     / x          PT/INR - ( 22 Sep 2019 04:30 )   PT: 12.80 sec;   INR: 1.11 ratio         PTT - ( 23 Sep 2019 05:30 )  PTT:84.8 sec  Urinalysis Basic - ( 21 Sep 2019 18:14 )    Color: Yellow / Appearance: Slightly Turbid / S.022 / pH: x  Gluc: x / Ketone: Negative  / Bili: Negative / Urobili: <2 mg/dL   Blood: x / Protein: 100 mg/dL / Nitrite: Negative   Leuk Esterase: Large / RBC: 14 /HPF / WBC 78 /HPF   Sq Epi: x / Non Sq Epi: 1 /HPF / Bacteria: Many      BNP  I&O's Detail    22 Sep 2019 07:  -  23 Sep 2019 07:00  --------------------------------------------------------  IN:    amiodarone Infusion: 66.6 mL    amiodarone Infusion: 300.6 mL    fentaNYL Infusion.: 272.8 mL    heparin Infusion: 240 mL    insulin regular Infusion: 54 mL    IV PiggyBack: 250 mL    milrinone  Infusion: 5.8 mL    norepinephrine Infusion: 493.8 mL  Total IN: 1683.6 mL    OUT:    Indwelling Catheter - Urethral: 1345 mL  Total OUT: 1345 mL    Total NET: 338.6 mL      23 Sep 2019 07:  -  23 Sep 2019 12:16  --------------------------------------------------------  IN:    fentaNYL Infusion.: 31.8 mL    heparin Infusion: 60 mL    insulin regular Infusion: 7 mL    norepinephrine Infusion: 22 mL  Total IN: 120.8 mL    OUT:    Indwelling Catheter - Urethral: 105 mL  Total OUT: 105 mL    Total NET: 15.8 mL        Daily     Daily Weight in k.2 (23 Sep 2019 06:00)    RADIOLOGY & ADDITIONAL STUDIES:  Xray Chest 1 View-PORTABLE IMMEDIATE (19 @ 05:03) >  Impression:      Stable pulmonary vascular congestion and small pleural effusions.    Support apparatus as above.

## 2019-09-23 NOTE — END OF VISIT
[>50% of Time Spent on Counseling for ____] : Greater than 50% of the encounter time was spent on counseling for [unfilled] [Time Spent: ___ minutes] : I have spent [unfilled] minutes of face to face time with the patient [FreeTextEntry3] : Start time of office visit: 2:14pm\par End time of office visit: 4:15pm

## 2019-09-23 NOTE — CONSULT NOTE ADULT - ATTENDING COMMENTS
67 yo F with history of uncontrolled DM (non-compliant), anxiety (on benzos at home), paroxysmal AFib (had a prior episode 16 yrs ago), left femoral fracture s/p fixation 1 mo ago admitted with CHF and NSTEMI. Hospital course complicated by acute respiratory failure requiring intubation, circulatory shock requiring pressors, 2 hours of AFib with RVR on 9/22 soon after starting Milrinone drip. We were consulted for paroxysmal AFib and cardiomyopathy.    Paroxysmal Afib for 2 hours, currently in sinus rhythm  Cardiomyopathy   NSTEMI  CHF  Acute hypoxic respiratory failure  Circulatory shock  Mod-severe MR (NEW)    Recommend  - Continue PO Amio and check daily ECG to evaluate QTc  - Wean Levophed as it can contribute to tachycardia  - It's possible that she is tachycardic due to benzo withdrawal. evaluate and treat as necessary.   - Recommend change pressors if possible as levophed could be contributing to tachycardia  - Cont heparin drip for NSTEMI and eval early for CAD brina given mod-severe MR on recent echo  - Pt may require Lifevest prior to discharge but this will depend on cath results  - Monitor lytes and keep K>4.0 and Mg >2.0

## 2019-09-23 NOTE — PROGRESS NOTE ADULT - SUBJECTIVE AND OBJECTIVE BOX
Subjective:  patient initially was admitted for acute pulmonary edema, hypoxia, afib, tachycardia, with a very high glucose, intubated, remained intubated, awake and alert, extremely emotional, nervous, receiving fentanyl with minimum effect. I was able to talk to her even though still was intubated, no chest pain, no palpitation, no specific complaint except asking to remove the tube.      MEDICATIONS  (STANDING):  amiodarone    Tablet 200 milliGRAM(s) Oral two times a day  amiodarone Infusion 0.5 mG/Min (16.667 mL/Hr) IV Continuous <Continuous>  aspirin  chewable 81 milliGRAM(s) Oral daily  atorvastatin 80 milliGRAM(s) Oral daily  chlorhexidine 0.12% Liquid 15 milliLiter(s) Oral Mucosa two times a day  chlorhexidine 4% Liquid 1 Application(s) Topical <User Schedule>  dextrose 5%. 1000 milliLiter(s) (50 mL/Hr) IV Continuous <Continuous>  dextrose 50% Injectable 12.5 Gram(s) IV Push once  dextrose 50% Injectable 25 Gram(s) IV Push once  dextrose 50% Injectable 25 Gram(s) IV Push once  docusate sodium Liquid 100 milliGRAM(s) Oral two times a day  fentaNYL   Infusion. 0.5 MICROgram(s)/kG/Hr (3.045 mL/Hr) IV Continuous <Continuous>  furosemide   Injectable 40 milliGRAM(s) IV Push every 12 hours  heparin  Infusion 1200 Unit(s)/Hr (12 mL/Hr) IV Continuous <Continuous>  insulin regular Infusion 1 Unit(s)/Hr (1 mL/Hr) IV Continuous <Continuous>  norepinephrine Infusion 0.05 MICROgram(s)/kG/Min (5.709 mL/Hr) IV Continuous <Continuous>  senna 2 Tablet(s) Oral at bedtime  sodium bicarbonate 650 milliGRAM(s) Oral every 8 hours    MEDICATIONS  (PRN):  dextrose 40% Gel 15 Gram(s) Oral once PRN Blood Glucose LESS THAN 70 milliGRAM(s)/deciliter  glucagon  Injectable 1 milliGRAM(s) IntraMuscular once PRN Glucose LESS THAN 70 milligrams/deciliter  morphine  - Injectable 2 milliGRAM(s) IV Push every 6 hours PRN Moderate Pain (4 - 6)            Vital Signs Last 24 Hrs  T(C): 37.5 (23 Sep 2019 11:52), Max: 37.7 (23 Sep 2019 00:00)  T(F): 99.5 (23 Sep 2019 11:52), Max: 99.8 (23 Sep 2019 00:00)  HR: 112 (23 Sep 2019 11:52) (90 - 146)  BP: 93/66 (23 Sep 2019 11:52) (78/47 - 119/69)  BP(mean): 76 (23 Sep 2019 11:52) (55 - 87)  RR: 18 (23 Sep 2019 11:52) (12 - 23)  SpO2: 100% (23 Sep 2019 11:52) (97% - 100%)             REVIEW OF SYSTEMS:  CONSTITUTIONAL: no fever, no chills, no diaphoresis  CARDIOLOGY: no chest pain, no palpitation, no diaphoresis  RESPIRATORY: no dyspnea, no wheeze,    NEUROLOGICAL: No headache, No focal deficits to report.  GI: no abdominal pain, no dyspepsia, no nausea   SKIN: no ecchymosis, no petechia             PHYSICAL EXAM:  · CONSTITUTIONAL: Looks stable  . NECK: Supple, no JVD,  · RESPIRATORY: air entry to lung base, no wheeze, but fine and coarse crackle  · CARDIOVASCULAR: S1, A2, P2, no murmur, S3, regular rate,  no rub,  · EXTREMITIES: No cyanosis, no clubbing, no edema  · VASCULAR: Pulses are regular, equal, bilateral in upper and lower extremities  	  TELEMETRY: Initially Afib tachycardia, now NSR, sinus tachycardia    ECG: < from: 12 Lead ECG (09.22.19 @ 14:41) >   Supraventricular tachycardia  Left anterior fascicular block  Anteroseptal infarct , age undetermined    < end of copied text >      TTE: < from: Transthoracic Echocardiogram (09.22.19 @ 06:53) >   1. Left ventricular ejection fraction, by visual estimation, is <20%.   2. LV Ejection Fraction by Renner's Method with a biplane EF of 18 %.   3. Severely decreased global left ventricular systolic function.   4. Spectral Doppler shows pseudonormal pattern of left ventricular   myocardial filling (Grade II diastolic dysfunction).   5. The mitral valve leaflets are tethered due to reduced systolic   function and elevated LVDP.   6. Moderate-to-severe mitral regurgitation.   7. Sclerotic aortic valve with normal opening.   8. The aortic valve mean gradient is 9.9 mmHg consistent with normally   opening aortic valve.   9. Estimated pulmonary artery systolic pressure is 37.5 mmHg assuming a   right atrial pressure of 15 mmHg, which is consistent with borderline   pulmonary hypertension.  10. LA volume Index is 73.0 ml/m² ml/m2.  11. Bilateral pleural effusions.    < end of copied text >    ECHO ON AUGUST 2019:  < from: Transthoracic Echocardiogram (08.02.19 @ 09:11) >   1. Left ventricular ejection fraction, by visual estimation, is 55 to   60%.   2. LV Ejection Fraction by Renner's Method with a biplane EF of 58 %.   3. Mildly increased LV wall thickness.   4. Spectral Doppler shows impaired relaxation pattern of left   ventricular myocardial filling (Grade I diastolic dysfunction).   5. Normal right atrial size.   6. Trivial pericardial effusion.   7. Mild mitral annular calcification.   8. Mild mitral valve regurgitation.   9. Mild thickening of the anterior and posterior mitral valve leaflets.  10. Mild aortic valve stenosis.  11. Peak transaortic gradient equals 27.0 mmHg, mean transaortic gradient   equals 10.9 mmHg, the calculated aortic valve area equals 1.34 cm² by the   continuity equation consistent with moderate aortic stenosis.    < end of copied text >      LABS:                        9.5    11.79 )-----------( 382      ( 23 Sep 2019 05:30 )             30.4     09-23    137  |  104  |  47<H>  ----------------------------<  147<H>  4.8   |  19  |  1.0    Ca    9.9      23 Sep 2019 05:30  Mg     2.1     09-23    TPro  6.2  /  Alb  3.4<L>  /  TBili  0.2  /  DBili  x   /  AST  49<H>  /  ALT  107<H>  /  AlkPhos  193<H>  09-23    CARDIAC MARKERS ( 22 Sep 2019 11:30 )  x     / 0.99 ng/mL / x     / x     / x      CARDIAC MARKERS ( 22 Sep 2019 00:43 )  x     / 0.40 ng/mL / x     / x     / x      CARDIAC MARKERS ( 21 Sep 2019 16:41 )  x     / 0.43 ng/mL / x     / x     / x          PT/INR - ( 22 Sep 2019 04:30 )   PT: 12.80 sec;   INR: 1.11 ratio         PTT - ( 23 Sep 2019 05:30 )  PTT:84.8 sec    I&O's Summary    22 Sep 2019 07:01  -  23 Sep 2019 07:00  --------------------------------------------------------  IN: 1683.6 mL / OUT: 1345 mL / NET: 338.6 mL    23 Sep 2019 07:01  -  23 Sep 2019 13:15  --------------------------------------------------------  IN: 120.8 mL / OUT: 105 mL / NET: 15.8 mL      BNP  RADIOLOGY & ADDITIONAL STUDIES: < from: Xray Chest 1 View- PORTABLE-Routine (09.23.19 @ 06:59) >  Worsening bilateral opacities and effusions.    < end of copied text >      IMPRESSION AND PLAN:

## 2019-09-23 NOTE — DISCUSSION/SUMMARY
[FreeTextEntry1] : \par Incomplete bladder emptying-\par Attempted to teach the patient intermittent self cathing teaching. Anatomy reviewed and handouts provided. Patient attempted to self cath using a 12 [place] straight cath, un successful in self reducing the prolapse enough in order to visualize the urethral meatus. Patient then declined further teaching and agreed to the indwelling montejo to be replaced until urodynamic testing. \par \par Patient placed into lithotomy position. 16Fr montejo catheter placed without difficulty using sterile technique for a return of 400 cc of clear water. Instilled 100 cc of sterile water into the balloon. Overnight bag changed to a leg bag and instructed how to complete at home if needed.\par \par Unable to void even with prolapse reduction. Advised further workup with urodynamics (with reduction). The patient voiced understanding and agrees to the plan.Discussed with the patient that we are concerned about incomplete bladder emptying because it can cause recurrent UTI and can cause kidney damage. The patient voiced understanding.\par \par Uterovaginal prolapse complete-\par The patient was counseled regarding the possible natural progression of prolapse and the clinical consequences of worsening prolapse. The stage and the location of the prolapse was reviewed with the patient. She was counseled regarding the management strategies including observation but if the prolapse is the cause of her incomplete bladder emptying that she can develop urinary retention or kidney damage, pessary placement (failed pessary fitting) and surgery (robotic hysterectomy/BS0/sacrocolpopexy but needs improvement of her AIC prior to surgery). The patient voiced understanding and agrees to consider her options.\par \par

## 2019-09-23 NOTE — COUNSELING
[FreeTextEntry1] : \par Please call the office if you have any issues with the montejo\par \par Please continue to take the miralax and the fiber daily to help prevent constipation\par \par Schedule bladder function testing with Dr Rodriguez (UDS with reduction) either on Wednesday or Friday.\par \par Please call the office if you feel like you have an infection because we can not do the bladder function testing in the setting of an infection\par \par

## 2019-09-23 NOTE — PROGRESS NOTE ADULT - SUBJECTIVE AND OBJECTIVE BOX
Patient is a 68y old  Female who presents with a chief complaint of SOB (23 Sep 2019 08:44)      SUBJECTIVE / OVERNIGHT EVENTS:  no cp, sob, abd pain, fever; intubated    MEDICATIONS  (STANDING):  amiodarone    Tablet 200 milliGRAM(s) Oral two times a day  amiodarone Infusion 0.5 mG/Min (16.667 mL/Hr) IV Continuous <Continuous>  aspirin  chewable 81 milliGRAM(s) Oral daily  atorvastatin 80 milliGRAM(s) Oral daily  chlorhexidine 0.12% Liquid 15 milliLiter(s) Oral Mucosa two times a day  chlorhexidine 4% Liquid 1 Application(s) Topical <User Schedule>  dextrose 5%. 1000 milliLiter(s) (50 mL/Hr) IV Continuous <Continuous>  dextrose 50% Injectable 12.5 Gram(s) IV Push once  dextrose 50% Injectable 25 Gram(s) IV Push once  dextrose 50% Injectable 25 Gram(s) IV Push once  docusate sodium Liquid 100 milliGRAM(s) Oral two times a day  fentaNYL   Infusion. 0.5 MICROgram(s)/kG/Hr (3.045 mL/Hr) IV Continuous <Continuous>  furosemide   Injectable 40 milliGRAM(s) IV Push every 12 hours  heparin  Infusion 1200 Unit(s)/Hr (12 mL/Hr) IV Continuous <Continuous>  insulin regular Infusion 1 Unit(s)/Hr (1 mL/Hr) IV Continuous <Continuous>  norepinephrine Infusion 0.05 MICROgram(s)/kG/Min (5.709 mL/Hr) IV Continuous <Continuous>  senna 2 Tablet(s) Oral at bedtime  sodium bicarbonate 650 milliGRAM(s) Oral every 8 hours    MEDICATIONS  (PRN):  dextrose 40% Gel 15 Gram(s) Oral once PRN Blood Glucose LESS THAN 70 milliGRAM(s)/deciliter  glucagon  Injectable 1 milliGRAM(s) IntraMuscular once PRN Glucose LESS THAN 70 milligrams/deciliter  morphine  - Injectable 2 milliGRAM(s) IV Push every 6 hours PRN Moderate Pain (4 - 6)        CAPILLARY BLOOD GLUCOSE      POCT Blood Glucose.: 134 mg/dL (23 Sep 2019 12:01)  POCT Blood Glucose.: 139 mg/dL (23 Sep 2019 10:10)  POCT Blood Glucose.: 177 mg/dL (23 Sep 2019 07:32)  POCT Blood Glucose.: 166 mg/dL (23 Sep 2019 05:56)  POCT Blood Glucose.: 137 mg/dL (23 Sep 2019 04:45)  POCT Blood Glucose.: 76 mg/dL (23 Sep 2019 02:39)  POCT Blood Glucose.: 144 mg/dL (23 Sep 2019 00:15)  POCT Blood Glucose.: 198 mg/dL (22 Sep 2019 22:00)  POCT Blood Glucose.: 215 mg/dL (22 Sep 2019 20:04)  POCT Blood Glucose.: 228 mg/dL (22 Sep 2019 18:42)  POCT Blood Glucose.: 166 mg/dL (22 Sep 2019 16:11)  POCT Blood Glucose.: 124 mg/dL (22 Sep 2019 13:52)    I&O's Summary    22 Sep 2019 07:  -  23 Sep 2019 07:00  --------------------------------------------------------  IN: 1683.6 mL / OUT: 1345 mL / NET: 338.6 mL    23 Sep 2019 07:01  -  23 Sep 2019 12:47  --------------------------------------------------------  IN: 120.8 mL / OUT: 105 mL / NET: 15.8 mL        PHYSICAL EXAM:  GENERAL: intubated, nad, oriented  EYES:  NECK:   CHEST/LUNG: ctab no w/r/r  HEART: rrr no m/g/r  ABDOMEN: soft nt nd  EXTREMITIES:  no edema bl  PSYCH:   NEUROLOGY:   SKIN:    LABS:                        9.5    11.79 )-----------( 382      ( 23 Sep 2019 05:30 )             30.4         137  |  104  |  47<H>  ----------------------------<  147<H>  4.8   |  19  |  1.0    Ca    9.9      23 Sep 2019 05:30  Mg     2.1         TPro  6.2  /  Alb  3.4<L>  /  TBili  0.2  /  DBili  x   /  AST  49<H>  /  ALT  107<H>  /  AlkPhos  193<H>  09-23    PT/INR - ( 22 Sep 2019 04:30 )   PT: 12.80 sec;   INR: 1.11 ratio         PTT - ( 23 Sep 2019 05:30 )  PTT:84.8 sec  CARDIAC MARKERS ( 22 Sep 2019 11:30 )  x     / 0.99 ng/mL / x     / x     / x      CARDIAC MARKERS ( 22 Sep 2019 00:43 )  x     / 0.40 ng/mL / x     / x     / x      CARDIAC MARKERS ( 21 Sep 2019 16:41 )  x     / 0.43 ng/mL / x     / x     / x          Urinalysis Basic - ( 21 Sep 2019 18:14 )    Color: Yellow / Appearance: Slightly Turbid / S.022 / pH: x  Gluc: x / Ketone: Negative  / Bili: Negative / Urobili: <2 mg/dL   Blood: x / Protein: 100 mg/dL / Nitrite: Negative   Leuk Esterase: Large / RBC: 14 /HPF / WBC 78 /HPF   Sq Epi: x / Non Sq Epi: 1 /HPF / Bacteria: Many        RADIOLOGY & ADDITIONAL TESTS:    Imaging Personally Reviewed:  Yes    Consultant(s) Notes Reviewed:      Care Discussed with Consultants/Other Providers:    Assessment and Plan   PAST MEDICAL & SURGICAL HISTORY:  Female bladder prolapse  Diabetes  No pertinent past medical history  History of repair of hip fracture

## 2019-09-23 NOTE — CONSULT NOTE ADULT - ASSESSMENT
68F with PMHx of uncontrolled DM (non-compliant), anxiety, Paroxysmal Afib s/p DCCV 16 years ago, recent labial abscess s/p ID, left femoral fracture s/p fixation one month ago.  She was admitted with NSTEMI and CHF.  Hospital course has been complicated by acute respiratory failure requiring intubation, circulatory shock requiring pressors, Afib with RVR.  EP was consulted for Afib and cardiomyopathy    Paroxysmal Afib  Cardiomyopathy  NSTEMI  CHF  Acute hypoxic respiratory failure  Circulatory shock    Plan:  PAfib with RVR.  Pt is currently in sinus tach.    -   - recommend change pressors if possible as levophed could be contributing to tachycardia  - per daughter, pt is dependant on benzos at home.  Recommend provide benzos to prevent withdrawal which could contribute to her tachycardia. (if no plans to extubate, consider precedex for sedation instead of fentanyl)  - Pt is on heparin gtt for NSTEMI.  Will assess need for anticoagulation once she is more stable    Cardiomyopathy  - Pt with an acute drop in EF.  Likely ischemic in setting on NSTEMI.  Pt is being followed by cardiology and will undergo cath once she is hemodynamically stable.  Will re-assess need for EP intervention once results of cath are known. 68F with PMHx of uncontrolled DM (non-compliant), anxiety, Paroxysmal Afib s/p DCCV 16 years ago, recent labial abscess s/p ID, left femoral fracture s/p fixation one month ago.  She was admitted with NSTEMI and CHF.  Hospital course has been complicated by acute respiratory failure requiring intubation, circulatory shock requiring pressors, Afib with RVR.  EP was consulted for Afib and cardiomyopathy    Paroxysmal Afib  Cardiomyopathy  NSTEMI  CHF  Acute hypoxic respiratory failure  Circulatory shock    Plan:  PAfib with RVR.  Afib began after Milrinone gtt started.  She converted to NSR at 1305 yesterday.  She is currently ST.   - Cont po amiodarone  - Cont digoxin  - recommend change pressors if possible as levophed could be contributing to tachycardia  - per daughter, pt is dependant on benzos at home.  Recommend provide benzos to prevent withdrawal which could contribute to her tachycardia. (if no plans to extubate, consider precedex for sedation instead of fentanyl)  - Pt is on heparin gtt for NSTEMI.      Cardiomyopathy  - Pt with an acute drop in EF.  Likely ischemic in setting on NSTEMI.  Pt is being followed by cardiology and will undergo cath once she is hemodynamically stable.  Will re-assess need for EP intervention once results of cath are known.  - Maintain K+>4.0 and Mg >2.0

## 2019-09-23 NOTE — PROGRESS NOTE ADULT - ASSESSMENT
68F PMHx DM2, pAF, h/o LGIB here with acute hypoxic resp failure due to acute decompensated HFrEF of 20%, s/p intubation. NSTEMI. Shock state, presumed cardiogenic. Hepatocellular liver dyfunction.    #Acute hypoxic resp failure, due to acute decompensated HFrEF of 20%, new  s/p intubation, mentating well; daily sbt per icu team  cxr with worsening effusions; lasix to 40 iv bid  afib now in sinus s/p amio; rate control as below  f/u cards  ekg noted; eventual LHC  #Cardiogenic shock  levophed to map >65  titrate off per icu team  good perfusion  #NSTEMI  hep gtt  asa, liptior 80  f/u cards for LHC  #Transaminitis, suspect due to congestive hepatopathy  downtrending, cont to monitor  no ruq pain on exam  #pAF with RVR, now resolved  amio 200 bid; load per cards  now in nsr  f/u eps  #DM2  insulin gtt   #DVT ppx  hep gtt    #Progress Note Handoff:  Pending (specify):  Consults_________, Tests________, Test Results_______, Other____extubation_____  Family discussion:d/w pt and daughter at bedside re: treatment plan, primary dx  Disposition: Home___/SNF___/Other________/Unknown at this time___x_____

## 2019-09-23 NOTE — DIETITIAN INITIAL EVALUATION ADULT. - RD TO REMAIN AVAILABLE
RD to monitor energy intake, body composition, NFPF, electrolyte profile, glucose profile, micronutrient profile/yes

## 2019-09-23 NOTE — PROGRESS NOTE ADULT - SUBJECTIVE AND OBJECTIVE BOX
GUS WILBURN 68y Female  MRN#: [id number]   Hospital Day: 2d    SUBJECTIVE  Patient is a 68y old Female who presents with a chief complaint of pulmonary edema, CHF, NSTEMI (23 Sep 2019 13:14)  Currently admitted to medicine with the primary diagnosis of Chest pain    INTERVAL HPI AND OVERNIGHT EVENTS:  Patient was examined and seen at bedside. This morning she is resting comfortably in bed and reports no issues or overnight events.    REVIEW OF SYMPTOMS:  CONSTITUTIONAL: No weakness, fevers or chills; No headaches  EYES: No visual changes, eye pain, or discharge  ENT: No vertigo; No ear pain or change in hearing; No sore throat or difficulty swallowing  NECK: No pain or stiffness  RESPIRATORY: No cough, wheezing, or hemoptysis; No shortness of breath  CARDIOVASCULAR: No chest pain or palpitations  GASTROINTESTINAL: No abdominal or epigastric pain; No nausea, vomiting, or hematemesis; No diarrhea or constipation; No melena or hematochezia  GENITOURINARY: No dysuria, frequency or hematuria  MUSCULOSKELETAL: No joint pain, no muscle pain, no weakness  NEUROLOGICAL: No numbness or weakness  SKIN: No itching or rashes      OBJECTIVE  PAST MEDICAL & SURGICAL HISTORY  Female bladder prolapse  Diabetes  History of repair of hip fracture    ALLERGIES:  No Known Allergies    MEDICATIONS:  STANDING MEDICATIONS  amiodarone    Tablet 200 milliGRAM(s) Oral two times a day  amiodarone Infusion 0.5 mG/Min IV Continuous <Continuous>  aspirin  chewable 81 milliGRAM(s) Oral daily  atorvastatin 80 milliGRAM(s) Oral daily  chlorhexidine 0.12% Liquid 15 milliLiter(s) Oral Mucosa two times a day  chlorhexidine 4% Liquid 1 Application(s) Topical <User Schedule>  dextrose 5%. 1000 milliLiter(s) IV Continuous <Continuous>  dextrose 50% Injectable 12.5 Gram(s) IV Push once  dextrose 50% Injectable 25 Gram(s) IV Push once  dextrose 50% Injectable 25 Gram(s) IV Push once  digoxin     Tablet 0.125 milliGRAM(s) Oral daily  docusate sodium Liquid 100 milliGRAM(s) Oral two times a day  fentaNYL   Infusion. 0.5 MICROgram(s)/kG/Hr IV Continuous <Continuous>  furosemide   Injectable 20 milliGRAM(s) IV Push every 12 hours  heparin  Infusion 1200 Unit(s)/Hr IV Continuous <Continuous>  insulin regular Infusion 1 Unit(s)/Hr IV Continuous <Continuous>  norepinephrine Infusion 0.05 MICROgram(s)/kG/Min IV Continuous <Continuous>  senna 2 Tablet(s) Oral at bedtime  sodium bicarbonate 650 milliGRAM(s) Oral every 8 hours    PRN MEDICATIONS  dextrose 40% Gel 15 Gram(s) Oral once PRN  glucagon  Injectable 1 milliGRAM(s) IntraMuscular once PRN  morphine  - Injectable 2 milliGRAM(s) IV Push every 6 hours PRN      VITAL SIGNS: Last 24 Hours  T(C): 37.9 (23 Sep 2019 13:35), Max: 37.9 (23 Sep 2019 13:35)  T(F): 100.3 (23 Sep 2019 13:35), Max: 100.3 (23 Sep 2019 13:35)  HR: 110 (23 Sep 2019 15:00) (90 - 130)  BP: 92/57 (23 Sep 2019 15:00) (78/47 - 119/69)  BP(mean): 68 (23 Sep 2019 15:00) (55 - 87)  RR: 14 (23 Sep 2019 15:00) (12 - 19)  SpO2: 100% (23 Sep 2019 15:00) (98% - 100%)    LABS:                        9.5    11.79 )-----------( 382      ( 23 Sep 2019 05:30 )             30.4         137  |  104  |  47<H>  ----------------------------<  147<H>  4.8   |  19  |  1.0    Ca    9.9      23 Sep 2019 05:30  Mg     2.1         TPro  6.2  /  Alb  3.4<L>  /  TBili  0.2  /  DBili  x   /  AST  49<H>  /  ALT  107<H>  /  AlkPhos  193<H>      PT/INR - ( 22 Sep 2019 04:30 )   PT: 12.80 sec;   INR: 1.11 ratio         PTT - ( 23 Sep 2019 05:30 )  PTT:84.8 sec  Urinalysis Basic - ( 21 Sep 2019 18:14 )    Color: Yellow / Appearance: Slightly Turbid / S.022 / pH: x  Gluc: x / Ketone: Negative  / Bili: Negative / Urobili: <2 mg/dL   Blood: x / Protein: 100 mg/dL / Nitrite: Negative   Leuk Esterase: Large / RBC: 14 /HPF / WBC 78 /HPF   Sq Epi: x / Non Sq Epi: 1 /HPF / Bacteria: Many      ABG - ( 23 Sep 2019 13:46 )  pH, Arterial: 7.34  pH, Blood: x     /  pCO2: 39    /  pO2: 132   / HCO3: 21    / Base Excess: -4.6  /  SaO2: 99                    Culture - Blood (collected 22 Sep 2019 06:56)  Source: .Blood Blood-Peripheral  Preliminary Report (23 Sep 2019 13:01):    No growth to date.    Culture - Urine (collected 21 Sep 2019 18:14)  Source: .Urine Catheterized  Preliminary Report (23 Sep 2019 06:21):    >100,000 CFU/ml Gram Negative Rods      CARDIAC MARKERS ( 22 Sep 2019 11:30 )  x     / 0.99 ng/mL / x     / x     / x      CARDIAC MARKERS ( 22 Sep 2019 00:43 )  x     / 0.40 ng/mL / x     / x     / x      CARDIAC MARKERS ( 21 Sep 2019 16:41 )  x     / 0.43 ng/mL / x     / x     / x          RADIOLOGY:  < from: Xray Chest 1 View- PORTABLE-Routine (19 @ 13:54) >  INTERPRETATION:  CLINICAL INDICATION:  Intubated    COMPARISON: Chest radiograph dated 2019 at 5:50 pm    TECHNIQUE: Frontal radiograph the chest.    FINDINGS:    Support devices: ET tube, enteric tube and left IJ central venous   catheter are stable.    Cardiac/mediastinum/hilum: Stable.    Lung parenchyma/Pleura: Stable bilateral opacities and effusions. There   is no pneumothorax.    Skeleton/soft tissues: Stable.    IMPRESSION:     Stable bilateral opacities and effusions.    < end of copied text >      PHYSICAL EXAM:  CONSTITUTIONAL: No acute distress, well-developed, well-groomed, intubated, sedated; able to follow commands  HEAD: Atraumatic, normocephalic  EYES: EOM intact, PERRLA, conjunctiva and sclera clear  ENT: Supple, no masses, no thyromegaly, no bruits, no JVD  PULMONARY: Clear to auscultation bilaterally; no wheezes, rales, or rhonchi  CARDIOVASCULAR: Regular rate and rhythm; no murmurs, rubs, or gallops  GASTROINTESTINAL: Soft, non-tender, non-distended; bowel sounds present  MUSCULOSKELETAL: 2+ peripheral pulses; no clubbing, no cyanosis, no edema  NEUROLOGY: non-focal  SKIN: No rashes or lesions; warm and dry    ASSESSMENT & PLAN  Patient is a 69yo female with PMHx of diabetes mellitus, paroxysmal atrial fibrillation, history of lower GI bleed and recently admitted for hip fracture who presented with acute hypoxic respiratory failure secondary to acute on chronic HFrEF s/p intubation. Found to have NSTEMI.    #Acute hypoxic respiratory failure s/p intubation  - Patient is able to follow commands  - Daily SBT  - Continue with fentanyl sedation  - Continue with current ventilation settings    #Acute on chronic HFrEF exacerbation  - Currently hypotensive with pressor support  - Cardiology consult appreciated  - Decrease furosemide to 20mg IV BID  - Discontinue heparin drip after 48 hours (9pm tonight) and switch to heparin SQ DVT prophylaxis  - Wean off levophed  - Will schedule for cardiac cath after extubation and stabilization of BP  - Discontinue clopidogrel  - Keep Mg>2 and K>4  - Patient may require Lifevest prior to discharge based on cath results    #Paroxysmal atrial fibrillation with RVR, resolved  - Cardiology consult appreciated  - Patient was loaded with digoxin yesterday  - Continue with amiodarone 200mg PO BID  - Start digoxin 0.125mg PO QD  - EP consult appreciated  - Daily EKG to evaluate QTc  - Wean off levophed    #Transaminitis, resolving  - Likely secondary to congestive hepatopathy  - No RUQ pain on exam  - Monitor LFTs    #Diabetes mellitus  - Continue insulin gtt  - Monitor fingerstick glucose    #Misc  - DVT Prophylaxis: Heparin gtt  - Diet: NPO with tube feeds (Glucerna 1.2)  - GI Prophylaxis: Pantoprazole 40mg PO QD  - Activity: Increase as tolerated  - IV Fluids: Not indicated  - Code Status: FULL CODE    Dispo: From home

## 2019-09-23 NOTE — HISTORY OF PRESENT ILLNESS
[FreeTextEntry1] : \par \par Pt with pelvic floor dysfunction here for urogynecologic evaluation. 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 Had surgery and then readmitted for bloody stool (had colnoscopy that was negative), severe constipation, UTI and urinary retention. Completed her antibiotic a couple of days ago.\par \par Pelvic organ prolapse: +bulge for the last 10 years, denies crede/splinting\par Stress urinary incontinence: denies\par Overactive bladder syndrome: denies frequent voiding or leakage, able to void without urgency. Hx of DM2, last A1C 15.\par Voiding dysfunction: recent Incomplete bladder emptying, recent hesitancy \par Lower urinary tract/vaginal symptoms: no recurrent UTIs per year, no hematuria, no dysuria, no bladder pain \par Fecal incontinence: denies\par Defecatory dysfunction: Taking Miralax once daily, now sausage\par Sexual dysfunction: not sexually active\par Pelvic pain: denies\par Vaginal dryness: occasional dryness, uses vaseline\par \par Her pelvic floor symptoms are significantly bothersome and negatively impacting her quality of life. \par \par

## 2019-09-23 NOTE — PROGRESS NOTE ADULT - ASSESSMENT
IMPRESSION:    Acute respiratory failure  Pulmonary edema   Bilateral pleural effusion likely volume overload   Cardiogenic shock  Acute HFrEF  Moderate to severe MR  NSTEMI?  RIC      PLAN:    CNS: Sedation as needed, feantnyl    HEENT: Oral care    PULMONARY:  HOB @ 45 degrees.  Vent changes as follows: Change FiO2 to 40% no changes    CARDIOVASCULAR: I < O, Lasix 40 BID, Check CE, Check 2d echo.  Cardiol FU , EPS eval    GI: GI prophylaxis.  OG Feeding     RENAL:  Follow up lytes.  Correct as needed. Replete HCO3 650 q8h. b hydroxyburated    INFECTIOUS DISEASE: Follow up cultures. IV abx    HEMATOLOGICAL:  DVT prophylaxis.  AC per cardiology     ENDOCRINE:  Follow up FS.    MUSCULOSKELETAL: Bedrest    MICU monitoring.    Overall prognosis poor

## 2019-09-23 NOTE — PROGRESS NOTE ADULT - SUBJECTIVE AND OBJECTIVE BOX
OVERNIGHT EVENTS: Still intubated, ventilated, levophed 0.06, fentanyl, insulin    Vital Signs Last 24 Hrs  T(C): 36.8 (23 Sep 2019 07:51), Max: 37.7 (23 Sep 2019 00:00)  T(F): 98.2 (23 Sep 2019 07:51), Max: 99.8 (23 Sep 2019 00:00)  HR: 118 (23 Sep 2019 07:51) (82 - 146)  BP: 92/56 (23 Sep 2019 07:51) (78/47 - 119/69)  BP(mean): 70 (23 Sep 2019 07:51) (55 - 87)  RR: 16 (23 Sep 2019 07:51) (11 - 23)  SpO2: 99% (23 Sep 2019 08:00) (97% - 100%)    PHYSICAL EXAMINATION:    GENERAL: axo follows commands    HEENT: Head is normocephalic and atraumatic. Extraocular muscles are intact. Mucous membranes are moist.    NECK: Supple.    LUNGS: dec bs both abses    HEART: irregular KIKI 3/6    ABDOMEN: Soft, nontender, and nondistended.      EXTREMITIES: Without any cyanosis, clubbing, rash, lesions or edema.    NEUROLOGIC: Grossly intact.    SKIN: No ulceration or induration present.      LABS:                        9.5    11.79 )-----------( 382      ( 23 Sep 2019 05:30 )             30.4         137  |  104  |  47<H>  ----------------------------<  147<H>  4.8   |  19  |  1.0    Ca    9.9      23 Sep 2019 05:30  Mg     2.1         TPro  6.2  /  Alb  3.4<L>  /  TBili  0.2  /  DBili  x   /  AST  49<H>  /  ALT  107<H>  /  AlkPhos  193<H>      PT/INR - ( 22 Sep 2019 04:30 )   PT: 12.80 sec;   INR: 1.11 ratio         PTT - ( 23 Sep 2019 05:30 )  PTT:84.8 sec  Urinalysis Basic - ( 21 Sep 2019 18:14 )    Color: Yellow / Appearance: Slightly Turbid / S.022 / pH: x  Gluc: x / Ketone: Negative  / Bili: Negative / Urobili: <2 mg/dL   Blood: x / Protein: 100 mg/dL / Nitrite: Negative   Leuk Esterase: Large / RBC: 14 /HPF / WBC 78 /HPF   Sq Epi: x / Non Sq Epi: 1 /HPF / Bacteria: Many      ABG - ( 23 Sep 2019 05:08 )  pH, Arterial: 7.29 pH, Blood: x     /  pCO2: 41   /  pO2: 120   / HCO3: 19    / Base Excess: -7.0  /  SaO2: 99          14/450/40/7.5      CARDIAC MARKERS ( 22 Sep 2019 11:30 )  x     / 0.99 ng/mL / x     / x     / x      CARDIAC MARKERS ( 22 Sep 2019 00:43 )  x     / 0.40 ng/mL / x     / x     / x      CARDIAC MARKERS ( 21 Sep 2019 16:41 )  x     / 0.43 ng/mL / x     / x     / x            Serum Pro-Brain Natriuretic Peptide: 07575 pg/mL (19 @ 16:41)            19 @ 07:01  -  19 @ 07:00  --------------------------------------------------------  IN: 1683.6 mL / OUT: 1345 mL / NET: 338.6 mL    19 @ 07:01  -  19 @ 08:46  --------------------------------------------------------  IN: 24.9 mL / OUT: 5 mL / NET: 19.9 mL        MICROBIOLOGY:  Culture Results:   >100,000 CFU/ml Gram Negative Rods ( @ 18:14)      MEDICATIONS  (STANDING):  amiodarone    Tablet 200 milliGRAM(s) Oral two times a day  amiodarone Infusion 0.5 mG/Min (16.667 mL/Hr) IV Continuous <Continuous>  aspirin  chewable 81 milliGRAM(s) Oral daily  atorvastatin 80 milliGRAM(s) Oral daily  chlorhexidine 0.12% Liquid 15 milliLiter(s) Oral Mucosa two times a day  chlorhexidine 4% Liquid 1 Application(s) Topical <User Schedule>  clopidogrel Tablet 75 milliGRAM(s) Oral daily  dextrose 5%. 1000 milliLiter(s) (50 mL/Hr) IV Continuous <Continuous>  dextrose 50% Injectable 12.5 Gram(s) IV Push once  dextrose 50% Injectable 25 Gram(s) IV Push once  dextrose 50% Injectable 25 Gram(s) IV Push once  docusate sodium Liquid 100 milliGRAM(s) Oral two times a day  fentaNYL   Infusion. 0.5 MICROgram(s)/kG/Hr (3.045 mL/Hr) IV Continuous <Continuous>  furosemide   Injectable 40 milliGRAM(s) IV Push every 12 hours  heparin  Infusion 1200 Unit(s)/Hr (12 mL/Hr) IV Continuous <Continuous>  insulin regular Infusion 1 Unit(s)/Hr (1 mL/Hr) IV Continuous <Continuous>  norepinephrine Infusion 0.05 MICROgram(s)/kG/Min (5.709 mL/Hr) IV Continuous <Continuous>  senna 2 Tablet(s) Oral at bedtime  sodium bicarbonate 650 milliGRAM(s) Oral every 8 hours    MEDICATIONS  (PRN):  dextrose 40% Gel 15 Gram(s) Oral once PRN Blood Glucose LESS THAN 70 milliGRAM(s)/deciliter  glucagon  Injectable 1 milliGRAM(s) IntraMuscular once PRN Glucose LESS THAN 70 milligrams/deciliter  morphine  - Injectable 2 milliGRAM(s) IV Push every 6 hours PRN Moderate Pain (4 - 6)      RADIOLOGY & ADDITIONAL STUDIES:

## 2019-09-23 NOTE — DIETITIAN INITIAL EVALUATION ADULT. - ENTERAL
Provide Osmolite 1.5 @35ml/h with Prosource TF BID. Initiate @15ml/h and increase by 10ml q8h as tolerated. This regimen at goal provides 1340kcal, 74g protein, 638ml free H2O (99% est calore needs, 100% est protein needs)

## 2019-09-23 NOTE — DIETITIAN INITIAL EVALUATION ADULT. - OTHER INFO
P/w: SOB. Acute respiratory failure. Pulmonary edema- intubated. Moderate to severe MR: Lasix, cardio following. ID following: on abx.

## 2019-09-23 NOTE — DIETITIAN INITIAL EVALUATION ADULT. - DIET TYPE
Per pt. and family, regular diet PTP, diminished appetite recently d/t anxiety. No supplement or nutritional shakes use. NKFA. Per family weight trending down, however when compared weights to previous admits appears stable.

## 2019-09-23 NOTE — PROGRESS NOTE ADULT - ASSESSMENT
acute pulmonary edema, systolic HF, on 8.2.19 EF reported 55-60%, with mild AS, mild MR, on 9.21.19 EF was reported 15%, no AS but moderate to severe MR?!  paroxysmal a.fib  hypotension on pressure support  NSTEMI vs myocardial injury due to hypoxia, pulmonary edema?  intravascularly becoming euvolemic  BUN/Cr 47/1.0    Digoxin 0.125 mg iv daily  Lasix decrease to 20 bid iv  d/c heparin iv after 48 hours and switch to heparin s/q fr dvt prophylaxis  try to taper down and d/c Levophed  keep on Amiodarone 400 mg daily  after extubation and stabilization of the BP will schedule for cardiac cath

## 2019-09-24 LAB
-  AMIKACIN: SIGNIFICANT CHANGE UP
-  AMPICILLIN/SULBACTAM: SIGNIFICANT CHANGE UP
-  AMPICILLIN: SIGNIFICANT CHANGE UP
-  AZTREONAM: SIGNIFICANT CHANGE UP
-  CEFAZOLIN: SIGNIFICANT CHANGE UP
-  CEFEPIME: SIGNIFICANT CHANGE UP
-  CEFOXITIN: SIGNIFICANT CHANGE UP
-  CEFTRIAXONE: SIGNIFICANT CHANGE UP
-  CIPROFLOXACIN: SIGNIFICANT CHANGE UP
-  ERTAPENEM: SIGNIFICANT CHANGE UP
-  GENTAMICIN: SIGNIFICANT CHANGE UP
-  IMIPENEM: SIGNIFICANT CHANGE UP
-  LEVOFLOXACIN: SIGNIFICANT CHANGE UP
-  MEROPENEM: SIGNIFICANT CHANGE UP
-  NITROFURANTOIN: SIGNIFICANT CHANGE UP
-  PIPERACILLIN/TAZOBACTAM: SIGNIFICANT CHANGE UP
-  TIGECYCLINE: SIGNIFICANT CHANGE UP
-  TOBRAMYCIN: SIGNIFICANT CHANGE UP
-  TRIMETHOPRIM/SULFAMETHOXAZOLE: SIGNIFICANT CHANGE UP
ALBUMIN SERPL ELPH-MCNC: 2.9 G/DL — LOW (ref 3.5–5.2)
ALP SERPL-CCNC: 183 U/L — HIGH (ref 30–115)
ALT FLD-CCNC: 79 U/L — HIGH (ref 0–41)
ANION GAP SERPL CALC-SCNC: 10 MMOL/L — SIGNIFICANT CHANGE UP (ref 7–14)
AST SERPL-CCNC: 37 U/L — SIGNIFICANT CHANGE UP (ref 0–41)
BASE EXCESS BLDA CALC-SCNC: -1.3 MMOL/L — SIGNIFICANT CHANGE UP (ref -2–2)
BASE EXCESS BLDA CALC-SCNC: -5.4 MMOL/L — LOW (ref -2–2)
BASOPHILS # BLD AUTO: 0.02 K/UL — SIGNIFICANT CHANGE UP (ref 0–0.2)
BASOPHILS NFR BLD AUTO: 0.3 % — SIGNIFICANT CHANGE UP (ref 0–1)
BILIRUB SERPL-MCNC: 0.3 MG/DL — SIGNIFICANT CHANGE UP (ref 0.2–1.2)
BUN SERPL-MCNC: 44 MG/DL — HIGH (ref 10–20)
CALCIUM SERPL-MCNC: 9.5 MG/DL — SIGNIFICANT CHANGE UP (ref 8.5–10.1)
CHLORIDE SERPL-SCNC: 107 MMOL/L — SIGNIFICANT CHANGE UP (ref 98–110)
CO2 SERPL-SCNC: 22 MMOL/L — SIGNIFICANT CHANGE UP (ref 17–32)
CREAT SERPL-MCNC: 0.9 MG/DL — SIGNIFICANT CHANGE UP (ref 0.7–1.5)
CULTURE RESULTS: SIGNIFICANT CHANGE UP
EOSINOPHIL # BLD AUTO: 0.07 K/UL — SIGNIFICANT CHANGE UP (ref 0–0.7)
EOSINOPHIL NFR BLD AUTO: 1.2 % — SIGNIFICANT CHANGE UP (ref 0–8)
GLUCOSE BLDC GLUCOMTR-MCNC: 164 MG/DL — HIGH (ref 70–99)
GLUCOSE BLDC GLUCOMTR-MCNC: 227 MG/DL — HIGH (ref 70–99)
GLUCOSE BLDC GLUCOMTR-MCNC: 229 MG/DL — HIGH (ref 70–99)
GLUCOSE BLDC GLUCOMTR-MCNC: 285 MG/DL — HIGH (ref 70–99)
GLUCOSE SERPL-MCNC: 139 MG/DL — HIGH (ref 70–99)
HCO3 BLDA-SCNC: 19 MMOL/L — LOW (ref 23–27)
HCO3 BLDA-SCNC: 23 MMOL/L — SIGNIFICANT CHANGE UP (ref 23–27)
HCT VFR BLD CALC: 26.4 % — LOW (ref 37–47)
HGB BLD-MCNC: 8.2 G/DL — LOW (ref 12–16)
HOROWITZ INDEX BLDA+IHG-RTO: 40 — SIGNIFICANT CHANGE UP
IMM GRANULOCYTES NFR BLD AUTO: 0.3 % — SIGNIFICANT CHANGE UP (ref 0.1–0.3)
LYMPHOCYTES # BLD AUTO: 0.61 K/UL — LOW (ref 1.2–3.4)
LYMPHOCYTES # BLD AUTO: 10.6 % — LOW (ref 20.5–51.1)
MAGNESIUM SERPL-MCNC: 2.1 MG/DL — SIGNIFICANT CHANGE UP (ref 1.8–2.4)
MCHC RBC-ENTMCNC: 26 PG — LOW (ref 27–31)
MCHC RBC-ENTMCNC: 31.1 G/DL — LOW (ref 32–37)
MCV RBC AUTO: 83.8 FL — SIGNIFICANT CHANGE UP (ref 81–99)
METHOD TYPE: SIGNIFICANT CHANGE UP
MONOCYTES # BLD AUTO: 0.48 K/UL — SIGNIFICANT CHANGE UP (ref 0.1–0.6)
MONOCYTES NFR BLD AUTO: 8.3 % — SIGNIFICANT CHANGE UP (ref 1.7–9.3)
NEUTROPHILS # BLD AUTO: 4.58 K/UL — SIGNIFICANT CHANGE UP (ref 1.4–6.5)
NEUTROPHILS NFR BLD AUTO: 79.3 % — HIGH (ref 42.2–75.2)
NRBC # BLD: 0 /100 WBCS — SIGNIFICANT CHANGE UP (ref 0–0)
ORGANISM # SPEC MICROSCOPIC CNT: SIGNIFICANT CHANGE UP
ORGANISM # SPEC MICROSCOPIC CNT: SIGNIFICANT CHANGE UP
PCO2 BLDA: 31 MMHG — LOW (ref 38–42)
PCO2 BLDA: 38 MMHG — SIGNIFICANT CHANGE UP (ref 38–42)
PH BLDA: 7.39 — SIGNIFICANT CHANGE UP (ref 7.38–7.42)
PH BLDA: 7.39 — SIGNIFICANT CHANGE UP (ref 7.38–7.42)
PHOSPHATE SERPL-MCNC: 4 MG/DL — SIGNIFICANT CHANGE UP (ref 2.1–4.9)
PLATELET # BLD AUTO: 249 K/UL — SIGNIFICANT CHANGE UP (ref 130–400)
PO2 BLDA: 136 MMHG — HIGH (ref 78–95)
PO2 BLDA: 160 MMHG — HIGH (ref 78–95)
POTASSIUM SERPL-MCNC: 4.4 MMOL/L — SIGNIFICANT CHANGE UP (ref 3.5–5)
POTASSIUM SERPL-SCNC: 4.4 MMOL/L — SIGNIFICANT CHANGE UP (ref 3.5–5)
PROT SERPL-MCNC: 5.3 G/DL — LOW (ref 6–8)
RBC # BLD: 3.15 M/UL — LOW (ref 4.2–5.4)
RBC # FLD: 14.3 % — SIGNIFICANT CHANGE UP (ref 11.5–14.5)
SAO2 % BLDA: 100 % — HIGH (ref 94–98)
SAO2 % BLDA: 100 % — HIGH (ref 94–98)
SODIUM SERPL-SCNC: 139 MMOL/L — SIGNIFICANT CHANGE UP (ref 135–146)
SPECIMEN SOURCE: SIGNIFICANT CHANGE UP
WBC # BLD: 5.78 K/UL — SIGNIFICANT CHANGE UP (ref 4.8–10.8)
WBC # FLD AUTO: 5.78 K/UL — SIGNIFICANT CHANGE UP (ref 4.8–10.8)

## 2019-09-24 PROCEDURE — 99233 SBSQ HOSP IP/OBS HIGH 50: CPT

## 2019-09-24 PROCEDURE — 93010 ELECTROCARDIOGRAM REPORT: CPT

## 2019-09-24 PROCEDURE — 71045 X-RAY EXAM CHEST 1 VIEW: CPT | Mod: 26

## 2019-09-24 RX ORDER — CEFEPIME 1 G/1
1000 INJECTION, POWDER, FOR SOLUTION INTRAMUSCULAR; INTRAVENOUS EVERY 8 HOURS
Refills: 0 | Status: DISCONTINUED | OUTPATIENT
Start: 2019-09-24 | End: 2019-10-01

## 2019-09-24 RX ORDER — INSULIN GLARGINE 100 [IU]/ML
12 INJECTION, SOLUTION SUBCUTANEOUS AT BEDTIME
Refills: 0 | Status: DISCONTINUED | OUTPATIENT
Start: 2019-09-24 | End: 2019-09-25

## 2019-09-24 RX ORDER — INSULIN LISPRO 100/ML
4 VIAL (ML) SUBCUTANEOUS
Refills: 0 | Status: DISCONTINUED | OUTPATIENT
Start: 2019-09-24 | End: 2019-09-25

## 2019-09-24 RX ORDER — INSULIN LISPRO 100/ML
VIAL (ML) SUBCUTANEOUS
Refills: 0 | Status: DISCONTINUED | OUTPATIENT
Start: 2019-09-24 | End: 2019-10-05

## 2019-09-24 RX ORDER — ALPRAZOLAM 0.25 MG
0.25 TABLET ORAL EVERY 12 HOURS
Refills: 0 | Status: DISCONTINUED | OUTPATIENT
Start: 2019-09-24 | End: 2019-09-26

## 2019-09-24 RX ORDER — METOPROLOL TARTRATE 50 MG
2.5 TABLET ORAL ONCE
Refills: 0 | Status: COMPLETED | OUTPATIENT
Start: 2019-09-24 | End: 2019-09-24

## 2019-09-24 RX ORDER — METOPROLOL TARTRATE 50 MG
25 TABLET ORAL ONCE
Refills: 0 | Status: COMPLETED | OUTPATIENT
Start: 2019-09-24 | End: 2019-09-24

## 2019-09-24 RX ORDER — CEFEPIME 1 G/1
1000 INJECTION, POWDER, FOR SOLUTION INTRAMUSCULAR; INTRAVENOUS ONCE
Refills: 0 | Status: COMPLETED | OUTPATIENT
Start: 2019-09-24 | End: 2019-09-24

## 2019-09-24 RX ORDER — CEFEPIME 1 G/1
INJECTION, POWDER, FOR SOLUTION INTRAMUSCULAR; INTRAVENOUS
Refills: 0 | Status: DISCONTINUED | OUTPATIENT
Start: 2019-09-24 | End: 2019-10-01

## 2019-09-24 RX ORDER — FUROSEMIDE 40 MG
40 TABLET ORAL ONCE
Refills: 0 | Status: COMPLETED | OUTPATIENT
Start: 2019-09-24 | End: 2019-09-24

## 2019-09-24 RX ADMIN — AMIODARONE HYDROCHLORIDE 200 MILLIGRAM(S): 400 TABLET ORAL at 06:17

## 2019-09-24 RX ADMIN — HEPARIN SODIUM 5000 UNIT(S): 5000 INJECTION INTRAVENOUS; SUBCUTANEOUS at 14:14

## 2019-09-24 RX ADMIN — Medication 81 MILLIGRAM(S): at 17:10

## 2019-09-24 RX ADMIN — CHLORHEXIDINE GLUCONATE 15 MILLILITER(S): 213 SOLUTION TOPICAL at 06:17

## 2019-09-24 RX ADMIN — Medication 2.5 MILLIGRAM(S): at 20:26

## 2019-09-24 RX ADMIN — Medication 0.25 MILLIGRAM(S): at 17:16

## 2019-09-24 RX ADMIN — Medication 100 MILLIGRAM(S): at 06:17

## 2019-09-24 RX ADMIN — CEFEPIME 100 MILLIGRAM(S): 1 INJECTION, POWDER, FOR SOLUTION INTRAMUSCULAR; INTRAVENOUS at 08:15

## 2019-09-24 RX ADMIN — Medication 0.12 MILLIGRAM(S): at 22:21

## 2019-09-24 RX ADMIN — INSULIN GLARGINE 12 UNIT(S): 100 INJECTION, SOLUTION SUBCUTANEOUS at 21:31

## 2019-09-24 RX ADMIN — Medication 100 MILLIGRAM(S): at 17:09

## 2019-09-24 RX ADMIN — CEFEPIME 100 MILLIGRAM(S): 1 INJECTION, POWDER, FOR SOLUTION INTRAMUSCULAR; INTRAVENOUS at 22:21

## 2019-09-24 RX ADMIN — Medication 2.5 MILLIGRAM(S): at 19:40

## 2019-09-24 RX ADMIN — Medication 40 MILLIGRAM(S): at 10:00

## 2019-09-24 RX ADMIN — Medication 650 MILLIGRAM(S): at 06:17

## 2019-09-24 RX ADMIN — Medication 20 MILLIGRAM(S): at 17:10

## 2019-09-24 RX ADMIN — CEFEPIME 100 MILLIGRAM(S): 1 INJECTION, POWDER, FOR SOLUTION INTRAMUSCULAR; INTRAVENOUS at 14:15

## 2019-09-24 RX ADMIN — ATORVASTATIN CALCIUM 80 MILLIGRAM(S): 80 TABLET, FILM COATED ORAL at 22:21

## 2019-09-24 RX ADMIN — Medication 1 MILLIGRAM(S): at 02:13

## 2019-09-24 RX ADMIN — HEPARIN SODIUM 5000 UNIT(S): 5000 INJECTION INTRAVENOUS; SUBCUTANEOUS at 06:17

## 2019-09-24 RX ADMIN — PANTOPRAZOLE SODIUM 40 MILLIGRAM(S): 20 TABLET, DELAYED RELEASE ORAL at 06:17

## 2019-09-24 RX ADMIN — HEPARIN SODIUM 5000 UNIT(S): 5000 INJECTION INTRAVENOUS; SUBCUTANEOUS at 21:30

## 2019-09-24 RX ADMIN — FENTANYL CITRATE 3.04 MICROGRAM(S)/KG/HR: 50 INJECTION INTRAVENOUS at 00:07

## 2019-09-24 RX ADMIN — Medication 4 UNIT(S): at 16:34

## 2019-09-24 RX ADMIN — CHLORHEXIDINE GLUCONATE 1 APPLICATION(S): 213 SOLUTION TOPICAL at 06:18

## 2019-09-24 RX ADMIN — Medication 3: at 16:34

## 2019-09-24 RX ADMIN — AMIODARONE HYDROCHLORIDE 200 MILLIGRAM(S): 400 TABLET ORAL at 17:10

## 2019-09-24 RX ADMIN — Medication 20 MILLIGRAM(S): at 06:18

## 2019-09-24 NOTE — PROGRESS NOTE ADULT - ASSESSMENT
IMPRESSION:    Acute respiratory failure  Pulmonary edema   Bilateral pleural effusion likely volume overload   Cardiogenic shock  Acute HFrEF  Moderate to severe MR  NSTEMI?  RIC      PLAN:    CNS: Hold sedation, 0.25 xanax q12h    HEENT: Oral care    PULMONARY:  HOB @ 45 degrees.  SBT    CARDIOVASCULAR: I < O, Lasix 40mg prior to extubation if passes SBT.    GI: GI prophylaxis.  Hold feeds for SBT    RENAL:  Follow up lytes.  Correct as needed.    INFECTIOUS DISEASE: Follow up cultures. IV abx    HEMATOLOGICAL:  DVT prophylaxis.  AC per cardiology     ENDOCRINE:  Follow up FS.    MUSCULOSKELETAL: Bedrest    MICU monitoring.    Overall prognosis poor IMPRESSION:    Acute respiratory failure  Pulmonary edema   Bilateral pleural effusion / volume overload   Cardiogenic shock off pressors now  Acute HFrEF  Moderate to severe MR  NSTEMI  RIC      PLAN:    CNS: Hold sedation, 0.25 xanax q12h as needed    HEENT: Oral care    PULMONARY:  HOB @ 45 degrees.  SBT    CARDIOVASCULAR: I < O, Lasix 40mg prior to extubation if passes SBT.    GI: GI prophylaxis.  Hold feeds for SBT    RENAL:  Follow up lytes.  Correct as needed.    INFECTIOUS DISEASE: Follow up cultures. IV abx change to cipro    HEMATOLOGICAL:  DVT prophylaxis.  AC per cardiology     ENDOCRINE:  Follow up FS.    MUSCULOSKELETAL: Bedrest    MICU monitoring.  spoke with daughter at bedside

## 2019-09-24 NOTE — PROGRESS NOTE ADULT - SUBJECTIVE AND OBJECTIVE BOX
Patient is a 68y old  Female who presents with a chief complaint of NSTEMI (23 Sep 2019 16:00)      SUBJECTIVE / OVERNIGHT EVENTS:  no cp, sob, abd pain, fever  no sob, orthopnea, pnd, cough    MEDICATIONS  (STANDING):  ALPRAZolam 0.25 milliGRAM(s) Oral every 12 hours  amiodarone    Tablet 200 milliGRAM(s) Oral two times a day  aspirin  chewable 81 milliGRAM(s) Oral daily  atorvastatin 80 milliGRAM(s) Oral daily  cefepime   IVPB      cefepime   IVPB 1000 milliGRAM(s) IV Intermittent every 8 hours  chlorhexidine 0.12% Liquid 15 milliLiter(s) Oral Mucosa two times a day  chlorhexidine 4% Liquid 1 Application(s) Topical <User Schedule>  dextrose 5%. 1000 milliLiter(s) (50 mL/Hr) IV Continuous <Continuous>  dextrose 50% Injectable 12.5 Gram(s) IV Push once  dextrose 50% Injectable 25 Gram(s) IV Push once  dextrose 50% Injectable 25 Gram(s) IV Push once  digoxin     Tablet 0.125 milliGRAM(s) Oral daily  docusate sodium Liquid 100 milliGRAM(s) Oral two times a day  fentaNYL   Infusion. 0.5 MICROgram(s)/kG/Hr (3.045 mL/Hr) IV Continuous <Continuous>  furosemide   Injectable 20 milliGRAM(s) IV Push every 12 hours  heparin  Injectable 5000 Unit(s) SubCutaneous every 8 hours  insulin regular Infusion 1 Unit(s)/Hr (1 mL/Hr) IV Continuous <Continuous>  norepinephrine Infusion 0.05 MICROgram(s)/kG/Min (5.709 mL/Hr) IV Continuous <Continuous>  pantoprazole    Tablet 40 milliGRAM(s) Oral before breakfast  senna 2 Tablet(s) Oral at bedtime    MEDICATIONS  (PRN):  dextrose 40% Gel 15 Gram(s) Oral once PRN Blood Glucose LESS THAN 70 milliGRAM(s)/deciliter  glucagon  Injectable 1 milliGRAM(s) IntraMuscular once PRN Glucose LESS THAN 70 milligrams/deciliter  morphine  - Injectable 2 milliGRAM(s) IV Push every 6 hours PRN Moderate Pain (4 - 6)        CAPILLARY BLOOD GLUCOSE      POCT Blood Glucose.: 229 mg/dL (24 Sep 2019 11:22)  POCT Blood Glucose.: 164 mg/dL (24 Sep 2019 00:02)  POCT Blood Glucose.: 148 mg/dL (23 Sep 2019 22:12)  POCT Blood Glucose.: 148 mg/dL (23 Sep 2019 20:10)  POCT Blood Glucose.: 133 mg/dL (23 Sep 2019 17:47)  POCT Blood Glucose.: 131 mg/dL (23 Sep 2019 16:05)  POCT Blood Glucose.: 134 mg/dL (23 Sep 2019 13:35)    I&O's Summary    23 Sep 2019 07:01  -  24 Sep 2019 07:00  --------------------------------------------------------  IN: 830.8 mL / OUT: 1005 mL / NET: -174.2 mL    24 Sep 2019 07:01  -  24 Sep 2019 13:11  --------------------------------------------------------  IN: 0 mL / OUT: 630 mL / NET: -630 mL        PHYSICAL EXAM:  GENERAL: nad, a+o x3  EYES:  NECK:   CHEST/LUNG: base crackles  HEART: tachy, irreg, no m/g/r  ABDOMEN: soft nt nd  EXTREMITIES:  no edema bl  PSYCH:   NEUROLOGY:   SKIN:    LABS:                        8.2    5.78  )-----------( 249      ( 24 Sep 2019 04:55 )             26.4     09-24    139  |  107  |  44<H>  ----------------------------<  139<H>  4.4   |  22  |  0.9    Ca    9.5      24 Sep 2019 04:55  Phos  4.0     09-24  Mg     2.1     09-24    TPro  5.3<L>  /  Alb  2.9<L>  /  TBili  0.3  /  DBili  x   /  AST  37  /  ALT  79<H>  /  AlkPhos  183<H>  09-24    PTT - ( 23 Sep 2019 16:30 )  PTT:60.5 sec          RADIOLOGY & ADDITIONAL TESTS:    Imaging Personally Reviewed:  Yes    Consultant(s) Notes Reviewed:      Care Discussed with Consultants/Other Providers:    Assessment and Plan   PAST MEDICAL & SURGICAL HISTORY:  Female bladder prolapse  Diabetes  No pertinent past medical history  History of repair of hip fracture

## 2019-09-24 NOTE — PROGRESS NOTE ADULT - SUBJECTIVE AND OBJECTIVE BOX
Subjective:    successfully extubated, in no respiratory distress looks comfortable, but bp at 76 to 82 mmhg, HR steadily at 140/min    MEDICATIONS  (STANDING):  ALPRAZolam 0.25 milliGRAM(s) Oral every 12 hours  amiodarone    Tablet 200 milliGRAM(s) Oral two times a day  aspirin  chewable 81 milliGRAM(s) Oral daily  atorvastatin 80 milliGRAM(s) Oral daily  cefepime   IVPB      cefepime   IVPB 1000 milliGRAM(s) IV Intermittent every 8 hours  chlorhexidine 0.12% Liquid 15 milliLiter(s) Oral Mucosa two times a day  chlorhexidine 4% Liquid 1 Application(s) Topical <User Schedule>  dextrose 5%. 1000 milliLiter(s) (50 mL/Hr) IV Continuous <Continuous>  dextrose 50% Injectable 12.5 Gram(s) IV Push once  dextrose 50% Injectable 25 Gram(s) IV Push once  dextrose 50% Injectable 25 Gram(s) IV Push once  digoxin     Tablet 0.125 milliGRAM(s) Oral daily  docusate sodium Liquid 100 milliGRAM(s) Oral two times a day  fentaNYL   Infusion. 0.5 MICROgram(s)/kG/Hr (3.045 mL/Hr) IV Continuous <Continuous>  furosemide   Injectable 20 milliGRAM(s) IV Push every 12 hours  heparin  Injectable 5000 Unit(s) SubCutaneous every 8 hours  insulin glargine Injectable (LANTUS) 12 Unit(s) SubCutaneous at bedtime  insulin lispro (HumaLOG) corrective regimen sliding scale   SubCutaneous three times a day before meals  insulin lispro Injectable (HumaLOG) 4 Unit(s) SubCutaneous three times a day before meals  insulin regular Infusion 1 Unit(s)/Hr (1 mL/Hr) IV Continuous <Continuous>  norepinephrine Infusion 0.05 MICROgram(s)/kG/Min (5.709 mL/Hr) IV Continuous <Continuous>  pantoprazole    Tablet 40 milliGRAM(s) Oral before breakfast  senna 2 Tablet(s) Oral at bedtime    MEDICATIONS  (PRN):  dextrose 40% Gel 15 Gram(s) Oral once PRN Blood Glucose LESS THAN 70 milliGRAM(s)/deciliter  glucagon  Injectable 1 milliGRAM(s) IntraMuscular once PRN Glucose LESS THAN 70 milligrams/deciliter  morphine  - Injectable 2 milliGRAM(s) IV Push every 6 hours PRN Moderate Pain (4 - 6)            Vital Signs Last 24 Hrs  T(C): 37.2 (24 Sep 2019 16:00), Max: 37.8 (23 Sep 2019 22:00)  T(F): 99 (24 Sep 2019 16:00), Max: 100 (23 Sep 2019 22:00)  HR: 138 (24 Sep 2019 18:59) (106 - 138)  BP: 82/58 (24 Sep 2019 18:59) (76/56 - 104/64)  BP(mean): 64 (24 Sep 2019 18:59) (61 - 82)  RR: 29 (24 Sep 2019 18:59) (13 - 29)  SpO2: 98% (24 Sep 2019 18:59) (98% - 100%)             REVIEW OF SYSTEMS:  CONSTITUTIONAL: no fever, no chills, no diaphoresis  CARDIOLOGY: no chest pain, no SOB, no palpitation, no diaphoresis, no faint   RESPIRATORY: no dyspnea, no wheeze, no orthopnea, no PND   NEUROLOGICAL: no dizziness, headache,   GI: no abdominal pain, no dyspepsia, no nausea, no vomiting, no diarrhea.    HEENT: no congestion, no nasal bleeding  SKIN: no ecchymosis, no petechia             PHYSICAL EXAM:  · CONSTITUTIONAL: no respiratory distress, tachycardic, but normal breathing at supine  . NECK: Supple, no JVD, no bruit on either carotid side   · RESPIRATORY: Normal air entry to lung base, no wheeze, no crackle, no wet rales  · CARDIOVASCULAR: S1, A2, P2, could not hear s3, very rapid hr, no murmur,   · EXTREMITIES: No cyanosis, no clubbing, no edema  · VASCULAR: Pulses are regular, fast, equal    TELEMETRY: tachyarrhythmia, most likely PAT at 140 vs ST    ECG: < from: 12 Lead ECG (09.24.19 @ 07:25) >  Sinus tachycardia with Premature atrial complexes  Anteroseptal infarct , age undetermined    < end of copied text >      TTE: < from: Transthoracic Echocardiogram (09.22.19 @ 06:53) >  Summary:   1. Left ventricular ejection fraction, by visual estimation, is <20%.   2. LV Ejection Fraction by Renner's Method with a biplane EF of 18 %.   3. Severely decreased global left ventricular systolic function.   4. Spectral Doppler shows pseudonormal pattern of left ventricular   myocardial filling (Grade II diastolic dysfunction).    < end of copied text >      LABS:                        8.2    5.78  )-----------( 249      ( 24 Sep 2019 04:55 )             26.4     09-24    139  |  107  |  44<H>  ----------------------------<  139<H>  4.4   |  22  |  0.9    Ca    9.5      24 Sep 2019 04:55  Phos  4.0     09-24  Mg     2.1     09-24    TPro  5.3<L>  /  Alb  2.9<L>  /  TBili  0.3  /  DBili  x   /  AST  37  /  ALT  79<H>  /  AlkPhos  183<H>  09-24        PTT - ( 23 Sep 2019 16:30 )  PTT:60.5 sec    I&O's Summary    23 Sep 2019 07:01  -  24 Sep 2019 07:00  --------------------------------------------------------  IN: 830.8 mL / OUT: 1005 mL / NET: -174.2 mL    24 Sep 2019 07:01  -  24 Sep 2019 19:15  --------------------------------------------------------  IN: 480 mL / OUT: 1035 mL / NET: -555 mL      BNP  RADIOLOGY & ADDITIONAL STUDIES: < from: Xray Chest 1 View- PORTABLE-Routine (09.24.19 @ 05:27) >  Improved bibasilar opacities/effusions.    Lines and support devices as described above.    < end of copied text >      IMPRESSION AND PLAN:

## 2019-09-24 NOTE — PROGRESS NOTE ADULT - SUBJECTIVE AND OBJECTIVE BOX
GUS WILBURN 68y Female  MRN#: [id number]   Hospital Day: 3d    SUBJECTIVE  Patient is a 68y old Female who presents with a chief complaint of NSTEMI (23 Sep 2019 16:00)  Currently admitted to medicine with the primary diagnosis of Chest pain    INTERVAL HPI AND OVERNIGHT EVENTS:  Patient was examined and seen at bedside. This morning she is resting comfortably in bed and reports no issues or overnight events. Off pressors and sedations.    Patient extubated today and tolerated BiPAP well. Currently saturating well on nasal cannula    REVIEW OF SYMPTOMS:  CONSTITUTIONAL: No weakness, fevers or chills; No headaches  EYES: No visual changes, eye pain, or discharge  ENT: No vertigo; No ear pain or change in hearing; No sore throat or difficulty swallowing  NECK: No pain or stiffness  RESPIRATORY: No cough, wheezing, or hemoptysis; No shortness of breath  CARDIOVASCULAR: No chest pain or palpitations  GASTROINTESTINAL: No abdominal or epigastric pain; No nausea, vomiting, or hematemesis; No diarrhea or constipation; No melena or hematochezia  GENITOURINARY: No dysuria, frequency or hematuria  MUSCULOSKELETAL: No joint pain, no muscle pain, no weakness  NEUROLOGICAL: No numbness or weakness  SKIN: No itching or rashes      OBJECTIVE  PAST MEDICAL & SURGICAL HISTORY  Female bladder prolapse  Diabetes  History of repair of hip fracture    ALLERGIES:  No Known Allergies    MEDICATIONS:  STANDING MEDICATIONS  ALPRAZolam 0.25 milliGRAM(s) Oral every 12 hours  amiodarone    Tablet 200 milliGRAM(s) Oral two times a day  aspirin  chewable 81 milliGRAM(s) Oral daily  atorvastatin 80 milliGRAM(s) Oral daily  cefepime   IVPB      cefepime   IVPB 1000 milliGRAM(s) IV Intermittent every 8 hours  chlorhexidine 0.12% Liquid 15 milliLiter(s) Oral Mucosa two times a day  chlorhexidine 4% Liquid 1 Application(s) Topical <User Schedule>  dextrose 5%. 1000 milliLiter(s) IV Continuous <Continuous>  dextrose 50% Injectable 12.5 Gram(s) IV Push once  dextrose 50% Injectable 25 Gram(s) IV Push once  dextrose 50% Injectable 25 Gram(s) IV Push once  digoxin     Tablet 0.125 milliGRAM(s) Oral daily  docusate sodium Liquid 100 milliGRAM(s) Oral two times a day  fentaNYL   Infusion. 0.5 MICROgram(s)/kG/Hr IV Continuous <Continuous>  furosemide   Injectable 20 milliGRAM(s) IV Push every 12 hours  heparin  Injectable 5000 Unit(s) SubCutaneous every 8 hours  insulin glargine Injectable (LANTUS) 12 Unit(s) SubCutaneous at bedtime  insulin lispro (HumaLOG) corrective regimen sliding scale   SubCutaneous three times a day before meals  insulin lispro Injectable (HumaLOG) 4 Unit(s) SubCutaneous three times a day before meals  insulin regular Infusion 1 Unit(s)/Hr IV Continuous <Continuous>  norepinephrine Infusion 0.05 MICROgram(s)/kG/Min IV Continuous <Continuous>  pantoprazole    Tablet 40 milliGRAM(s) Oral before breakfast  senna 2 Tablet(s) Oral at bedtime    PRN MEDICATIONS  dextrose 40% Gel 15 Gram(s) Oral once PRN  glucagon  Injectable 1 milliGRAM(s) IntraMuscular once PRN  morphine  - Injectable 2 milliGRAM(s) IV Push every 6 hours PRN      VITAL SIGNS: Last 24 Hours  T(C): 37.1 (24 Sep 2019 11:57), Max: 37.8 (23 Sep 2019 22:00)  T(F): 98.7 (24 Sep 2019 11:57), Max: 100 (23 Sep 2019 22:00)  HR: 126 (24 Sep 2019 14:00) (106 - 126)  BP: 82/56 (24 Sep 2019 14:00) (82/56 - 117/70)  BP(mean): 64 (24 Sep 2019 14:00) (64 - 85)  RR: 20 (24 Sep 2019 14:00) (13 - 23)  SpO2: 100% (24 Sep 2019 14:00) (99% - 100%)    LABS:                        8.2    5.78  )-----------( 249      ( 24 Sep 2019 04:55 )             26.4     09-24    139  |  107  |  44<H>  ----------------------------<  139<H>  4.4   |  22  |  0.9    Ca    9.5      24 Sep 2019 04:55  Phos  4.0     09-24  Mg     2.1     09-24    TPro  5.3<L>  /  Alb  2.9<L>  /  TBili  0.3  /  DBili  x   /  AST  37  /  ALT  79<H>  /  AlkPhos  183<H>  09-24    PTT - ( 23 Sep 2019 16:30 )  PTT:60.5 sec    ABG - ( 24 Sep 2019 09:54 )  pH, Arterial: 7.39  pH, Blood: x     /  pCO2: 31    /  pO2: 136   / HCO3: 19    / Base Excess: -5.4  /  SaO2: 100                   Culture - Urine (collected 22 Sep 2019 13:45)  Source: .Urine Catheterized  Final Report (23 Sep 2019 18:27):    No growth    Culture - Blood (collected 22 Sep 2019 06:56)  Source: .Blood Blood-Peripheral  Preliminary Report (23 Sep 2019 13:01):    No growth to date.    Culture - Urine (collected 21 Sep 2019 18:14)  Source: .Urine Catheterized  Final Report (24 Sep 2019 12:32):    >100,000 CFU/ml Serratia marcescens  Organism: Serratia marcescens (24 Sep 2019 12:32)  Organism: Serratia marcescens (24 Sep 2019 12:32)          RADIOLOGY:  < from: Xray Chest 1 View- PORTABLE-Routine (09.24.19 @ 05:27) >  INTERPRETATION:  Clinical History / Reason for exam: Intubated.    Comparison : Chest radiograph 9/23/2019.    Technique/Positioning: Single AP view of the chest.    Findings:    Support devices: Stable endotracheal and gastric tubes. Stable left IJ   central venous catheter.    Cardiac/mediastinum/hilum: Stable.    Lung parenchyma/Pleura: Improved bibasilar opacities/effusions. No   pneumothorax.    Skeleton/soft tissues: Stable.    Impression:      Improved bibasilar opacities/effusions.    Lines and support devices as described above.    < end of copied text >      PHYSICAL EXAM:  CONSTITUTIONAL: No acute distress, well-developed, well-groomed, AAOx3  HEAD: Atraumatic, normocephalic  EYES: EOM intact, PERRLA, conjunctiva and sclera clear  ENT: Supple, no masses, no thyromegaly, no bruits, no JVD; moist mucous membranes  PULMONARY: Clear to auscultation bilaterally anteriorly; no wheezes, rales, or rhonchi  CARDIOVASCULAR: Irregularly irregular rhythm; no murmurs, rubs, or gallops  GASTROINTESTINAL: Soft, non-tender, non-distended; bowel sounds present  MUSCULOSKELETAL: 2+ peripheral pulses; no clubbing, no cyanosis, no edema  NEUROLOGY: non-focal  SKIN: No rashes or lesions; warm and dry    ASSESSMENT & PLAN  Patient is a 67yo female with PMHx of diabetes mellitus, paroxysmal atrial fibrillation, history of lower GI bleed and recently admitted for hip fracture who presented with acute hypoxic respiratory failure secondary to acute on chronic HFrEF s/p intubation. Found to have NSTEMI.    #Acute hypoxic respiratory failure s/p extubation, resolving  - Off pressors and sedation  - Patient is able to follow commands  - Patient passed SBT and was extubated after Lasix 40mg IV Push x1 dose  - Tolerated BiPAP well and is now on nasal cannula  - Chest X-ray shows reduced bilateral opacities  - Repeat chest X-ray in AM    #NSTEMI  - Patient received 48 hours of heparing gtt  - Cardiology consult appreciated  - ECG today shows no R waves in precordial leads, which signifies LAD lesion  - Continue with atorvastatin 80mg PO QD  - Continue with aspirin 81mg PO QD  - Will schedule for cardiac cath after stabilization of BP  - Difficult to determine possible benefit from cath at this time    #Acute on chronic HFrEF exacerbation, resolving  - Off pressors this morning  - Cardiology consult appreciated  - Continue with furosemide 20mg IV BID  - Continue with heparin 5000 units SQ TID  - Will schedule for cardiac cath after stabilization of BP  - Keep Mg>2 and K>4  - Patient may require Lifevest prior to discharge based on cath results    #Paroxysmal atrial fibrillation with RVR, resolved  - Cardiology consult appreciated  - Continue with amiodarone 200mg PO BID  - Continue with digoxin 0.125mg PO QD  - EP consult appreciated  - QTc today was 443ms  - Daily EKG to evaluate QTc    #Possible urinary tract infection  - Patient has indwelling Castellon catheter  - Urine culture from 9/22/2019: no growth to date  - Continue with cefepime 1g IV Q8H  - Repeat urine culture    #Transaminitis, resolving  - Likely secondary to congestive hepatopathy  - No RUQ pain on exam  - LFTs trending down  - Monitor LFTs    #Diabetes mellitus  - Discontinue insulin gtt  - Start and continue insulin glargine 12 units SQ QHS  - Start and continue insulin lispro 4 units SQ TID with meals  - Insulin sliding scale coverage  - Monitor fingerstick glucose    #Anxiety  - Start and continue Xanax 0.25mg PO Q12H    #Misc  - DVT Prophylaxis: heparin 5000 units SQ TID  - Diet: Dysphagia 3 diet with thin liquids, DASH/TLC, consistent carbohydrates with evening snack  - GI Prophylaxis: Pantoprazole 40mg PO QD  - Activity: Increase as tolerated  - IV Fluids: Encourage PO intake  - Code Status: FULL CODE    Dispo: From home GUS WILBURN 68y Female  MRN#: [id number]   Hospital Day: 3d    SUBJECTIVE  Patient is a 68y old Female who presents with a chief complaint of NSTEMI (23 Sep 2019 16:00)  Currently admitted to medicine with the primary diagnosis of Chest pain    INTERVAL HPI AND OVERNIGHT EVENTS:  Patient was examined and seen at bedside. This morning she is resting comfortably in bed and reports no issues or overnight events. Off pressors and sedations.    Patient extubated today and tolerated BiPAP well. Currently saturating well on nasal cannula    REVIEW OF SYMPTOMS:  CONSTITUTIONAL: No weakness, fevers or chills; No headaches  EYES: No visual changes, eye pain, or discharge  ENT: No vertigo; No ear pain or change in hearing; No sore throat or difficulty swallowing  NECK: No pain or stiffness  RESPIRATORY: No cough, wheezing, or hemoptysis; No shortness of breath  CARDIOVASCULAR: No chest pain or palpitations  GASTROINTESTINAL: No abdominal or epigastric pain; No nausea, vomiting, or hematemesis; No diarrhea or constipation; No melena or hematochezia  GENITOURINARY: No dysuria, frequency or hematuria  MUSCULOSKELETAL: No joint pain, no muscle pain, no weakness  NEUROLOGICAL: No numbness or weakness  SKIN: No itching or rashes      OBJECTIVE  PAST MEDICAL & SURGICAL HISTORY  Female bladder prolapse  Diabetes  History of repair of hip fracture    ALLERGIES:  No Known Allergies    MEDICATIONS:  STANDING MEDICATIONS  ALPRAZolam 0.25 milliGRAM(s) Oral every 12 hours  amiodarone    Tablet 200 milliGRAM(s) Oral two times a day  aspirin  chewable 81 milliGRAM(s) Oral daily  atorvastatin 80 milliGRAM(s) Oral daily  cefepime   IVPB      cefepime   IVPB 1000 milliGRAM(s) IV Intermittent every 8 hours  chlorhexidine 0.12% Liquid 15 milliLiter(s) Oral Mucosa two times a day  chlorhexidine 4% Liquid 1 Application(s) Topical <User Schedule>  dextrose 5%. 1000 milliLiter(s) IV Continuous <Continuous>  dextrose 50% Injectable 12.5 Gram(s) IV Push once  dextrose 50% Injectable 25 Gram(s) IV Push once  dextrose 50% Injectable 25 Gram(s) IV Push once  digoxin     Tablet 0.125 milliGRAM(s) Oral daily  docusate sodium Liquid 100 milliGRAM(s) Oral two times a day  fentaNYL   Infusion. 0.5 MICROgram(s)/kG/Hr IV Continuous <Continuous>  furosemide   Injectable 20 milliGRAM(s) IV Push every 12 hours  heparin  Injectable 5000 Unit(s) SubCutaneous every 8 hours  insulin glargine Injectable (LANTUS) 12 Unit(s) SubCutaneous at bedtime  insulin lispro (HumaLOG) corrective regimen sliding scale   SubCutaneous three times a day before meals  insulin lispro Injectable (HumaLOG) 4 Unit(s) SubCutaneous three times a day before meals  insulin regular Infusion 1 Unit(s)/Hr IV Continuous <Continuous>  norepinephrine Infusion 0.05 MICROgram(s)/kG/Min IV Continuous <Continuous>  pantoprazole    Tablet 40 milliGRAM(s) Oral before breakfast  senna 2 Tablet(s) Oral at bedtime    PRN MEDICATIONS  dextrose 40% Gel 15 Gram(s) Oral once PRN  glucagon  Injectable 1 milliGRAM(s) IntraMuscular once PRN  morphine  - Injectable 2 milliGRAM(s) IV Push every 6 hours PRN      VITAL SIGNS: Last 24 Hours  T(C): 37.1 (24 Sep 2019 11:57), Max: 37.8 (23 Sep 2019 22:00)  T(F): 98.7 (24 Sep 2019 11:57), Max: 100 (23 Sep 2019 22:00)  HR: 126 (24 Sep 2019 14:00) (106 - 126)  BP: 82/56 (24 Sep 2019 14:00) (82/56 - 117/70)  BP(mean): 64 (24 Sep 2019 14:00) (64 - 85)  RR: 20 (24 Sep 2019 14:00) (13 - 23)  SpO2: 100% (24 Sep 2019 14:00) (99% - 100%)    LABS:                        8.2    5.78  )-----------( 249      ( 24 Sep 2019 04:55 )             26.4     09-24    139  |  107  |  44<H>  ----------------------------<  139<H>  4.4   |  22  |  0.9    Ca    9.5      24 Sep 2019 04:55  Phos  4.0     09-24  Mg     2.1     09-24    TPro  5.3<L>  /  Alb  2.9<L>  /  TBili  0.3  /  DBili  x   /  AST  37  /  ALT  79<H>  /  AlkPhos  183<H>  09-24    PTT - ( 23 Sep 2019 16:30 )  PTT:60.5 sec    ABG - ( 24 Sep 2019 09:54 )  pH, Arterial: 7.39  pH, Blood: x     /  pCO2: 31    /  pO2: 136   / HCO3: 19    / Base Excess: -5.4  /  SaO2: 100                   Culture - Urine (collected 22 Sep 2019 13:45)  Source: .Urine Catheterized  Final Report (23 Sep 2019 18:27):    No growth    Culture - Blood (collected 22 Sep 2019 06:56)  Source: .Blood Blood-Peripheral  Preliminary Report (23 Sep 2019 13:01):    No growth to date.    Culture - Urine (collected 21 Sep 2019 18:14)  Source: .Urine Catheterized  Final Report (24 Sep 2019 12:32):    >100,000 CFU/ml Serratia marcescens  Organism: Serratia marcescens (24 Sep 2019 12:32)  Organism: Serratia marcescens (24 Sep 2019 12:32)          RADIOLOGY:  < from: Xray Chest 1 View- PORTABLE-Routine (09.24.19 @ 05:27) >  INTERPRETATION:  Clinical History / Reason for exam: Intubated.    Comparison : Chest radiograph 9/23/2019.    Technique/Positioning: Single AP view of the chest.    Findings:    Support devices: Stable endotracheal and gastric tubes. Stable left IJ   central venous catheter.    Cardiac/mediastinum/hilum: Stable.    Lung parenchyma/Pleura: Improved bibasilar opacities/effusions. No   pneumothorax.    Skeleton/soft tissues: Stable.    Impression:      Improved bibasilar opacities/effusions.    Lines and support devices as described above.    < end of copied text >      PHYSICAL EXAM:  CONSTITUTIONAL: No acute distress, well-developed, well-groomed, AAOx3  HEAD: Atraumatic, normocephalic  EYES: EOM intact, PERRLA, conjunctiva and sclera clear  ENT: Supple, no masses, no thyromegaly, no bruits, no JVD; moist mucous membranes  PULMONARY: Clear to auscultation bilaterally anteriorly; no wheezes, rales, or rhonchi  CARDIOVASCULAR: Irregularly irregular rhythm; no murmurs, rubs, or gallops  GASTROINTESTINAL: Soft, non-tender, non-distended; bowel sounds present  MUSCULOSKELETAL: 2+ peripheral pulses; no clubbing, no cyanosis, no edema  NEUROLOGY: non-focal  SKIN: No rashes or lesions; warm and dry    ASSESSMENT & PLAN  Patient is a 67yo female with PMHx of diabetes mellitus, paroxysmal atrial fibrillation, history of lower GI bleed and recently admitted for hip fracture who presented with acute hypoxic respiratory failure secondary to acute on chronic HFrEF s/p intubation. Found to have NSTEMI.    #Acute hypoxic respiratory failure s/p extubation, resolving  - Off pressors and sedation  - Patient is able to follow commands  - Patient passed SBT and was extubated after Lasix 40mg IV Push x1 dose  - Tolerated BiPAP well and is now on nasal cannula  - Chest X-ray shows reduced bilateral opacities  - Repeat chest X-ray in AM    #NSTEMI  - Patient received 48 hours of heparing gtt  - Cardiology consult appreciated  - ECG today shows no R waves in precordial leads, which signifies LAD lesion  - Continue with atorvastatin 80mg PO QD  - Continue with aspirin 81mg PO QD  - Will schedule for cardiac cath after stabilization of BP  - Difficult to determine possible benefit from cath at this time    #Acute on chronic HFrEF exacerbation, resolving  - Off pressors this morning  - Cardiology consult appreciated  - Continue with furosemide 20mg IV BID  - Continue with heparin 5000 units SQ TID  - Will schedule for cardiac cath after stabilization of BP  - Keep Mg>2 and K>4  - Patient may require Lifevest prior to discharge based on cath results    #Paroxysmal atrial fibrillation with RVR, resolved  - Cardiology consult appreciated  - Continue with amiodarone 200mg PO BID  - Continue with digoxin 0.125mg PO QD  - EP consult appreciated  - QTc today was 443ms  - Daily EKG to evaluate QTc    #Possible urinary tract infection  - Patient has indwelling Castellon catheter  - Urine culture from 9/22/2019: no growth to date  - Continue with cefepime 1g IV Q8H  - Repeat urine culture    #Transaminitis, resolving  - Likely secondary to congestive hepatopathy  - No RUQ pain on exam  - LFTs trending down  - Monitor LFTs    #Diabetes mellitus  - Discontinue insulin gtt  - Start and continue insulin glargine 12 units SQ QHS  - Start and continue insulin lispro 4 units SQ TID with meals  - Insulin sliding scale coverage  - Beta hydroxybutyrate elevated at 1.2 yesterday  - Anion gap today was 10  - Monitor fingerstick glucose    #Anxiety  - Start and continue Xanax 0.25mg PO Q12H    #Misc  - DVT Prophylaxis: heparin 5000 units SQ TID  - Diet: Dysphagia 3 diet with thin liquids, DASH/TLC, consistent carbohydrates with evening snack  - GI Prophylaxis: Pantoprazole 40mg PO QD  - Activity: Increase as tolerated  - IV Fluids: Encourage PO intake  - Code Status: FULL CODE    Dispo: From home

## 2019-09-24 NOTE — PROGRESS NOTE ADULT - ASSESSMENT
68F PMHx DM2, pAF, h/o LGIB here with acute hypoxic resp failure due to acute decompensated HFrEF of 20%, s/p intubation. NSTEMI. Shock state, presumed cardiogenic. Hepatocellular liver dysfunction. UTI, serratia.    #Acute hypoxic resp failure, due to acute decompensated HFrEF of 20%, new  s/p intubation, now extubated  cxr improving, cont lasix 20 iv bid, f/u cards  ekg noted; eventual LHC  #Cardiogenic shock  levophed to map >65  titrate off per icu team  #NSTEMI  s/p hep gtt  asa, liptior 80  f/u cards for LHC  #UTI, serratia  cefepime for 7 days total  #Transaminitis, suspect due to congestive hepatopathy  downtrending, cont to monitor  no ruq pain on exam  #pAF with RVR, now resolved  amio 200 bid  still rapid with pAF  dig load per cards  f/u eps  #DM2  insulin gtt   #DVT ppx  hep gtt    #Progress Note Handoff:  Pending (specify):  Consults_________, Tests________, Test Results_______, Other____ischemic w/u_____  Family discussion:d/w pt and daughter at bedside re: treatment plan, primary dx  Disposition: Home___/SNF___/Other________/Unknown at this time___x_____

## 2019-09-24 NOTE — PROGRESS NOTE ADULT - SUBJECTIVE AND OBJECTIVE BOX
Patient is a 68y old  Female who presents with a chief complaint of NSTEMI (23 Sep 2019 16:00)        Over Night Events: No events overnight. Remains intubated, no longer on pressors.        ROS:  See HPI    PHYSICAL EXAM    ICU Vital Signs Last 24 Hrs  T(C): 37.2 (24 Sep 2019 07:22), Max: 37.9 (23 Sep 2019 13:35)  T(F): 98.9 (24 Sep 2019 07:22), Max: 100.3 (23 Sep 2019 13:35)  HR: 118 (24 Sep 2019 07:22) (106 - 126)  BP: 99/58 (24 Sep 2019 07:22) (88/55 - 117/70)  BP(mean): 71 (24 Sep 2019 07:22) (65 - 85)  RR: 15 (24 Sep 2019 07:22) (13 - 19)  SpO2: 99% (24 Sep 2019 08:20) (99% - 100%)      General: NAD  HEENT: + ETT             Lymph Nodes: No cervical LN   Lungs: Bilateral breath sounds anteriorly  Cardiovascular: Irregular  Abdomen: Soft, Positive BS  Extremities: No clubbing   Skin: Warm  Neurological: Non focal       09-23-19 @ 07:01  -  09-24-19 @ 07:00  --------------------------------------------------------  IN:    fentaNYL Infusion.: 173.8 mL    Free Water: 240 mL    Glucerna: 180 mL    heparin Infusion: 156 mL    insulin regular Infusion: 30 mL    norepinephrine Infusion: 51 mL  Total IN: 830.8 mL    OUT:    Indwelling Catheter - Urethral: 1005 mL  Total OUT: 1005 mL    Total NET: -174.2 mL      09-24-19 @ 07:01  -  09-24-19 @ 09:02  --------------------------------------------------------  IN:  Total IN: 0 mL    OUT:    Indwelling Catheter - Urethral: 30 mL  Total OUT: 30 mL    Total NET: -30 mL          LABS:                            8.2    5.78  )-----------( 249      ( 24 Sep 2019 04:55 )             26.4                                               09-24    139  |  107  |  44<H>  ----------------------------<  139<H>  4.4   |  22  |  0.9    Ca    9.5      24 Sep 2019 04:55  Phos  4.0     09-24  Mg     2.1     09-24    TPro  5.3<L>  /  Alb  2.9<L>  /  TBili  0.3  /  DBili  x   /  AST  37  /  ALT  79<H>  /  AlkPhos  183<H>  09-24      PTT - ( 23 Sep 2019 16:30 )  PTT:60.5 sec                                           CARDIAC MARKERS ( 22 Sep 2019 11:30 )  x     / 0.99 ng/mL / x     / x     / x        LIVER FUNCTIONS - ( 24 Sep 2019 04:55 )  Alb: 2.9 g/dL / Pro: 5.3 g/dL / ALK PHOS: 183 U/L / ALT: 79 U/L / AST: 37 U/L / GGT: x                                                  Culture - Urine (collected 22 Sep 2019 13:45)  Source: .Urine Catheterized  Final Report (23 Sep 2019 18:27):    No growth    Culture - Blood (collected 22 Sep 2019 06:56)  Source: .Blood Blood-Peripheral  Preliminary Report (23 Sep 2019 13:01):    No growth to date.    Culture - Urine (collected 21 Sep 2019 18:14)  Source: .Urine Catheterized  Preliminary Report (23 Sep 2019 06:21):    >100,000 CFU/ml Gram Negative Rods                                                   Mode: AC/ CMV (Assist Control/ Continuous Mandatory Ventilation)  RR (machine): 14  TV (machine): 450  FiO2: 40  PEEP: 7.5  ITime: 1  MAP: 12  PIP: 20   ABG - ( 24 Sep 2019 04:41 )  pH, Arterial: 7.39  pH, Blood: x     /  pCO2: 38    /  pO2: 160   / HCO3: 23    / Base Excess: -1.3  /  SaO2: 100                 MEDICATIONS  (STANDING):  amiodarone    Tablet 200 milliGRAM(s) Oral two times a day  aspirin  chewable 81 milliGRAM(s) Oral daily  atorvastatin 80 milliGRAM(s) Oral daily  cefepime   IVPB      cefepime   IVPB 1000 milliGRAM(s) IV Intermittent every 8 hours  chlorhexidine 0.12% Liquid 15 milliLiter(s) Oral Mucosa two times a day  chlorhexidine 4% Liquid 1 Application(s) Topical <User Schedule>  dextrose 5%. 1000 milliLiter(s) (50 mL/Hr) IV Continuous <Continuous>  dextrose 50% Injectable 12.5 Gram(s) IV Push once  dextrose 50% Injectable 25 Gram(s) IV Push once  dextrose 50% Injectable 25 Gram(s) IV Push once  digoxin     Tablet 0.125 milliGRAM(s) Oral daily  docusate sodium Liquid 100 milliGRAM(s) Oral two times a day  fentaNYL   Infusion. 0.5 MICROgram(s)/kG/Hr (3.045 mL/Hr) IV Continuous <Continuous>  furosemide   Injectable 20 milliGRAM(s) IV Push every 12 hours  heparin  Injectable 5000 Unit(s) SubCutaneous every 8 hours  insulin regular Infusion 1 Unit(s)/Hr (1 mL/Hr) IV Continuous <Continuous>  norepinephrine Infusion 0.05 MICROgram(s)/kG/Min (5.709 mL/Hr) IV Continuous <Continuous>  pantoprazole    Tablet 40 milliGRAM(s) Oral before breakfast  senna 2 Tablet(s) Oral at bedtime  sodium bicarbonate 650 milliGRAM(s) Oral every 8 hours    MEDICATIONS  (PRN):  dextrose 40% Gel 15 Gram(s) Oral once PRN Blood Glucose LESS THAN 70 milliGRAM(s)/deciliter  glucagon  Injectable 1 milliGRAM(s) IntraMuscular once PRN Glucose LESS THAN 70 milligrams/deciliter  morphine  - Injectable 2 milliGRAM(s) IV Push every 6 hours PRN Moderate Pain (4 - 6)      Xrays:                                                                                     ECHO Patient is a 68y old  Female who presents with a chief complaint of SOB (23 Sep 2019 16:00)        Over Night Events: No events overnight. Remains intubated, no longer on pressors. S/P EPS/ cardio        ROS:  See HPI    PHYSICAL EXAM    ICU Vital Signs Last 24 Hrs  T(C): 37.2 (24 Sep 2019 07:22), Max: 37.9 (23 Sep 2019 13:35)  T(F): 98.9 (24 Sep 2019 07:22), Max: 100.3 (23 Sep 2019 13:35)  HR: 118 (24 Sep 2019 07:22) (106 - 126)  BP: 99/58 (24 Sep 2019 07:22) (88/55 - 117/70)  BP(mean): 71 (24 Sep 2019 07:22) (65 - 85)  RR: 15 (24 Sep 2019 07:22) (13 - 19)  SpO2: 99% (24 Sep 2019 08:20) (99% - 100%)      General: NAD  HEENT: + ETT             Lymph Nodes: No cervical LN   Lungs: Bilateral breath sounds anteriorly  Cardiovascular: Irregular  Abdomen: Soft, Positive BS  Extremities: No clubbing   Skin: Warm  Neurological: Non focal       09-23-19 @ 07:01  -  09-24-19 @ 07:00  --------------------------------------------------------  IN:    fentaNYL Infusion.: 173.8 mL    Free Water: 240 mL    Glucerna: 180 mL    heparin Infusion: 156 mL    insulin regular Infusion: 30 mL    norepinephrine Infusion: 51 mL  Total IN: 830.8 mL    OUT:    Indwelling Catheter - Urethral: 1005 mL  Total OUT: 1005 mL    Total NET: -174.2 mL      09-24-19 @ 07:01  -  09-24-19 @ 09:02  --------------------------------------------------------  IN:  Total IN: 0 mL    OUT:    Indwelling Catheter - Urethral: 30 mL  Total OUT: 30 mL    Total NET: -30 mL          LABS:                            8.2    5.78  )-----------( 249      ( 24 Sep 2019 04:55 )             26.4                                               09-24    139  |  107  |  44<H>  ----------------------------<  139<H>  4.4   |  22  |  0.9    Ca    9.5      24 Sep 2019 04:55  Phos  4.0     09-24  Mg     2.1     09-24    TPro  5.3<L>  /  Alb  2.9<L>  /  TBili  0.3  /  DBili  x   /  AST  37  /  ALT  79<H>  /  AlkPhos  183<H>  09-24      PTT - ( 23 Sep 2019 16:30 )  PTT:60.5 sec                                           CARDIAC MARKERS ( 22 Sep 2019 11:30 )  x     / 0.99 ng/mL / x     / x     / x        LIVER FUNCTIONS - ( 24 Sep 2019 04:55 )  Alb: 2.9 g/dL / Pro: 5.3 g/dL / ALK PHOS: 183 U/L / ALT: 79 U/L / AST: 37 U/L / GGT: x                                                  Culture - Urine (collected 22 Sep 2019 13:45)  Source: .Urine Catheterized  Final Report (23 Sep 2019 18:27):    No growth    Culture - Blood (collected 22 Sep 2019 06:56)  Source: .Blood Blood-Peripheral  Preliminary Report (23 Sep 2019 13:01):    No growth to date.    Culture - Urine (collected 21 Sep 2019 18:14)  Source: .Urine Catheterized  Preliminary Report (23 Sep 2019 06:21):    >100,000 CFU/ml Gram Negative Rods                                                   Mode: AC/ CMV (Assist Control/ Continuous Mandatory Ventilation)  RR (machine): 14  TV (machine): 450  FiO2: 40  PEEP: 7.5  ITime: 1  MAP: 12  PIP: 20   ABG - ( 24 Sep 2019 04:41 )  pH, Arterial: 7.39  pH, Blood: x     /  pCO2: 38    /  pO2: 160   / HCO3: 23    / Base Excess: -1.3  /  SaO2: 100                 MEDICATIONS  (STANDING):  amiodarone    Tablet 200 milliGRAM(s) Oral two times a day  aspirin  chewable 81 milliGRAM(s) Oral daily  atorvastatin 80 milliGRAM(s) Oral daily  cefepime   IVPB      cefepime   IVPB 1000 milliGRAM(s) IV Intermittent every 8 hours  chlorhexidine 0.12% Liquid 15 milliLiter(s) Oral Mucosa two times a day  chlorhexidine 4% Liquid 1 Application(s) Topical <User Schedule>  dextrose 5%. 1000 milliLiter(s) (50 mL/Hr) IV Continuous <Continuous>  dextrose 50% Injectable 12.5 Gram(s) IV Push once  dextrose 50% Injectable 25 Gram(s) IV Push once  dextrose 50% Injectable 25 Gram(s) IV Push once  digoxin     Tablet 0.125 milliGRAM(s) Oral daily  docusate sodium Liquid 100 milliGRAM(s) Oral two times a day  fentaNYL   Infusion. 0.5 MICROgram(s)/kG/Hr (3.045 mL/Hr) IV Continuous <Continuous>  furosemide   Injectable 20 milliGRAM(s) IV Push every 12 hours  heparin  Injectable 5000 Unit(s) SubCutaneous every 8 hours  insulin regular Infusion 1 Unit(s)/Hr (1 mL/Hr) IV Continuous <Continuous>  norepinephrine Infusion 0.05 MICROgram(s)/kG/Min (5.709 mL/Hr) IV Continuous <Continuous>  pantoprazole    Tablet 40 milliGRAM(s) Oral before breakfast  senna 2 Tablet(s) Oral at bedtime  sodium bicarbonate 650 milliGRAM(s) Oral every 8 hours    MEDICATIONS  (PRN):  dextrose 40% Gel 15 Gram(s) Oral once PRN Blood Glucose LESS THAN 70 milliGRAM(s)/deciliter  glucagon  Injectable 1 milliGRAM(s) IntraMuscular once PRN Glucose LESS THAN 70 milligrams/deciliter  morphine  - Injectable 2 milliGRAM(s) IV Push every 6 hours PRN Moderate Pain (4 - 6)      Xrays:   reviewed

## 2019-09-24 NOTE — PROGRESS NOTE ADULT - ASSESSMENT
Stable, continue supportive management     post extubation  tachycardia, PAT vs sinus tachycardia,   hypotension 76/56 to 82/56 mmhg  cardiomyopathy  anterior MI recent, possibly she had anterior MI days prior to coming to hospital, and later on went to pulmonary edema, gradually     give 2.5 mg of iv metoprolol, if tolerates start Esmolol without bolus to control this HR  continue with Amiodarone and Digoxin  will attempt a cardiac cath when patient is more stable not when has bp at 80 and HR at 140, without any chest pain  priority is stabilising vital sign then out of bed to chair, and slight ambulation then cardiac cath.

## 2019-09-25 LAB
ALBUMIN SERPL ELPH-MCNC: 2.9 G/DL — LOW (ref 3.5–5.2)
ALBUMIN SERPL ELPH-MCNC: 3.1 G/DL — LOW (ref 3.5–5.2)
ALBUMIN SERPL ELPH-MCNC: 3.2 G/DL — LOW (ref 3.5–5.2)
ALP SERPL-CCNC: 185 U/L — HIGH (ref 30–115)
ALP SERPL-CCNC: 202 U/L — HIGH (ref 30–115)
ALP SERPL-CCNC: 208 U/L — HIGH (ref 30–115)
ALT FLD-CCNC: 61 U/L — HIGH (ref 0–41)
ALT FLD-CCNC: 65 U/L — HIGH (ref 0–41)
ALT FLD-CCNC: 76 U/L — HIGH (ref 0–41)
ANION GAP SERPL CALC-SCNC: 12 MMOL/L — SIGNIFICANT CHANGE UP (ref 7–14)
ANION GAP SERPL CALC-SCNC: 15 MMOL/L — HIGH (ref 7–14)
ANION GAP SERPL CALC-SCNC: 21 MMOL/L — HIGH (ref 7–14)
APPEARANCE UR: CLEAR — SIGNIFICANT CHANGE UP
AST SERPL-CCNC: 20 U/L — SIGNIFICANT CHANGE UP (ref 0–41)
AST SERPL-CCNC: 21 U/L — SIGNIFICANT CHANGE UP (ref 0–41)
AST SERPL-CCNC: 26 U/L — SIGNIFICANT CHANGE UP (ref 0–41)
B-OH-BUTYR SERPL-SCNC: 3.6 MMOL/L — HIGH
BACTERIA # UR AUTO: NEGATIVE — SIGNIFICANT CHANGE UP
BILIRUB SERPL-MCNC: 0.2 MG/DL — SIGNIFICANT CHANGE UP (ref 0.2–1.2)
BILIRUB SERPL-MCNC: 0.3 MG/DL — SIGNIFICANT CHANGE UP (ref 0.2–1.2)
BILIRUB SERPL-MCNC: 0.4 MG/DL — SIGNIFICANT CHANGE UP (ref 0.2–1.2)
BILIRUB UR-MCNC: NEGATIVE — SIGNIFICANT CHANGE UP
BUN SERPL-MCNC: 53 MG/DL — HIGH (ref 10–20)
BUN SERPL-MCNC: 56 MG/DL — HIGH (ref 10–20)
BUN SERPL-MCNC: 56 MG/DL — HIGH (ref 10–20)
C DIFF BY PCR RESULT: POSITIVE
C DIFF TOX GENS STL QL NAA+PROBE: SIGNIFICANT CHANGE UP
CALCIUM SERPL-MCNC: 9.7 MG/DL — SIGNIFICANT CHANGE UP (ref 8.5–10.1)
CALCIUM SERPL-MCNC: 9.8 MG/DL — SIGNIFICANT CHANGE UP (ref 8.5–10.1)
CALCIUM SERPL-MCNC: 9.9 MG/DL — SIGNIFICANT CHANGE UP (ref 8.5–10.1)
CHLORIDE SERPL-SCNC: 100 MMOL/L — SIGNIFICANT CHANGE UP (ref 98–110)
CHLORIDE SERPL-SCNC: 103 MMOL/L — SIGNIFICANT CHANGE UP (ref 98–110)
CHLORIDE SERPL-SCNC: 103 MMOL/L — SIGNIFICANT CHANGE UP (ref 98–110)
CO2 SERPL-SCNC: 15 MMOL/L — LOW (ref 17–32)
CO2 SERPL-SCNC: 19 MMOL/L — SIGNIFICANT CHANGE UP (ref 17–32)
CO2 SERPL-SCNC: 20 MMOL/L — SIGNIFICANT CHANGE UP (ref 17–32)
COLOR SPEC: YELLOW — SIGNIFICANT CHANGE UP
CREAT SERPL-MCNC: 1.2 MG/DL — SIGNIFICANT CHANGE UP (ref 0.7–1.5)
DIFF PNL FLD: NEGATIVE — SIGNIFICANT CHANGE UP
EPI CELLS # UR: 6 /HPF — HIGH (ref 0–5)
GAS PNL BLDA: SIGNIFICANT CHANGE UP
GLUCOSE BLDC GLUCOMTR-MCNC: 111 MG/DL — HIGH (ref 70–99)
GLUCOSE BLDC GLUCOMTR-MCNC: 161 MG/DL — HIGH (ref 70–99)
GLUCOSE BLDC GLUCOMTR-MCNC: 172 MG/DL — HIGH (ref 70–99)
GLUCOSE BLDC GLUCOMTR-MCNC: 179 MG/DL — HIGH (ref 70–99)
GLUCOSE BLDC GLUCOMTR-MCNC: 183 MG/DL — HIGH (ref 70–99)
GLUCOSE BLDC GLUCOMTR-MCNC: 263 MG/DL — HIGH (ref 70–99)
GLUCOSE BLDC GLUCOMTR-MCNC: 300 MG/DL — HIGH (ref 70–99)
GLUCOSE BLDC GLUCOMTR-MCNC: 339 MG/DL — HIGH (ref 70–99)
GLUCOSE BLDC GLUCOMTR-MCNC: 360 MG/DL — HIGH (ref 70–99)
GLUCOSE SERPL-MCNC: 184 MG/DL — HIGH (ref 70–99)
GLUCOSE SERPL-MCNC: 191 MG/DL — HIGH (ref 70–99)
GLUCOSE SERPL-MCNC: 367 MG/DL — HIGH (ref 70–99)
GLUCOSE UR QL: ABNORMAL
HCT VFR BLD CALC: 29.1 % — LOW (ref 37–47)
HGB BLD-MCNC: 8.9 G/DL — LOW (ref 12–16)
HYALINE CASTS # UR AUTO: 22 /LPF — HIGH (ref 0–7)
KETONES UR-MCNC: ABNORMAL
LEUKOCYTE ESTERASE UR-ACNC: ABNORMAL
MAGNESIUM SERPL-MCNC: 2 MG/DL — SIGNIFICANT CHANGE UP (ref 1.8–2.4)
MAGNESIUM SERPL-MCNC: 2 MG/DL — SIGNIFICANT CHANGE UP (ref 1.8–2.4)
MCHC RBC-ENTMCNC: 25.5 PG — LOW (ref 27–31)
MCHC RBC-ENTMCNC: 30.6 G/DL — LOW (ref 32–37)
MCV RBC AUTO: 83.4 FL — SIGNIFICANT CHANGE UP (ref 81–99)
NITRITE UR-MCNC: NEGATIVE — SIGNIFICANT CHANGE UP
NRBC # BLD: 0 /100 WBCS — SIGNIFICANT CHANGE UP (ref 0–0)
PH UR: 5.5 — SIGNIFICANT CHANGE UP (ref 5–8)
PHOSPHATE SERPL-MCNC: 3.4 MG/DL — SIGNIFICANT CHANGE UP (ref 2.1–4.9)
PHOSPHATE SERPL-MCNC: 4.1 MG/DL — SIGNIFICANT CHANGE UP (ref 2.1–4.9)
PLATELET # BLD AUTO: 353 K/UL — SIGNIFICANT CHANGE UP (ref 130–400)
POTASSIUM SERPL-MCNC: 4.2 MMOL/L — SIGNIFICANT CHANGE UP (ref 3.5–5)
POTASSIUM SERPL-MCNC: 4.4 MMOL/L — SIGNIFICANT CHANGE UP (ref 3.5–5)
POTASSIUM SERPL-MCNC: 4.6 MMOL/L — SIGNIFICANT CHANGE UP (ref 3.5–5)
POTASSIUM SERPL-SCNC: 4.2 MMOL/L — SIGNIFICANT CHANGE UP (ref 3.5–5)
POTASSIUM SERPL-SCNC: 4.4 MMOL/L — SIGNIFICANT CHANGE UP (ref 3.5–5)
POTASSIUM SERPL-SCNC: 4.6 MMOL/L — SIGNIFICANT CHANGE UP (ref 3.5–5)
PROT SERPL-MCNC: 5.5 G/DL — LOW (ref 6–8)
PROT SERPL-MCNC: 6 G/DL — SIGNIFICANT CHANGE UP (ref 6–8)
PROT SERPL-MCNC: 6.2 G/DL — SIGNIFICANT CHANGE UP (ref 6–8)
PROT UR-MCNC: ABNORMAL
RBC # BLD: 3.49 M/UL — LOW (ref 4.2–5.4)
RBC # FLD: 14.6 % — HIGH (ref 11.5–14.5)
RBC CASTS # UR COMP ASSIST: 3 /HPF — SIGNIFICANT CHANGE UP (ref 0–4)
SODIUM SERPL-SCNC: 135 MMOL/L — SIGNIFICANT CHANGE UP (ref 135–146)
SODIUM SERPL-SCNC: 136 MMOL/L — SIGNIFICANT CHANGE UP (ref 135–146)
SODIUM SERPL-SCNC: 137 MMOL/L — SIGNIFICANT CHANGE UP (ref 135–146)
SP GR SPEC: 1.02 — SIGNIFICANT CHANGE UP (ref 1.01–1.02)
UROBILINOGEN FLD QL: SIGNIFICANT CHANGE UP
WBC # BLD: 13.17 K/UL — HIGH (ref 4.8–10.8)
WBC # FLD AUTO: 13.17 K/UL — HIGH (ref 4.8–10.8)
WBC UR QL: 15 /HPF — HIGH (ref 0–5)

## 2019-09-25 PROCEDURE — 93010 ELECTROCARDIOGRAM REPORT: CPT

## 2019-09-25 PROCEDURE — 93970 EXTREMITY STUDY: CPT | Mod: 26

## 2019-09-25 PROCEDURE — 99233 SBSQ HOSP IP/OBS HIGH 50: CPT

## 2019-09-25 PROCEDURE — 71045 X-RAY EXAM CHEST 1 VIEW: CPT | Mod: 26

## 2019-09-25 PROCEDURE — 93010 ELECTROCARDIOGRAM REPORT: CPT | Mod: 77

## 2019-09-25 RX ORDER — DOPAMINE HYDROCHLORIDE 40 MG/ML
5 INJECTION, SOLUTION, CONCENTRATE INTRAVENOUS
Qty: 400 | Refills: 0 | Status: DISCONTINUED | OUTPATIENT
Start: 2019-09-25 | End: 2019-09-25

## 2019-09-25 RX ORDER — VANCOMYCIN HCL 1 G
125 VIAL (EA) INTRAVENOUS EVERY 6 HOURS
Refills: 0 | Status: COMPLETED | OUTPATIENT
Start: 2019-09-25 | End: 2019-10-05

## 2019-09-25 RX ORDER — ACETAMINOPHEN 500 MG
650 TABLET ORAL ONCE
Refills: 0 | Status: COMPLETED | OUTPATIENT
Start: 2019-09-25 | End: 2019-09-25

## 2019-09-25 RX ORDER — INSULIN GLARGINE 100 [IU]/ML
15 INJECTION, SOLUTION SUBCUTANEOUS AT BEDTIME
Refills: 0 | Status: DISCONTINUED | OUTPATIENT
Start: 2019-09-25 | End: 2019-10-04

## 2019-09-25 RX ORDER — SODIUM CHLORIDE 9 MG/ML
150 INJECTION INTRAMUSCULAR; INTRAVENOUS; SUBCUTANEOUS ONCE
Refills: 0 | Status: COMPLETED | OUTPATIENT
Start: 2019-09-25 | End: 2019-09-25

## 2019-09-25 RX ORDER — FUROSEMIDE 40 MG
40 TABLET ORAL ONCE
Refills: 0 | Status: COMPLETED | OUTPATIENT
Start: 2019-09-25 | End: 2019-09-25

## 2019-09-25 RX ORDER — SODIUM CHLORIDE 9 MG/ML
250 INJECTION INTRAMUSCULAR; INTRAVENOUS; SUBCUTANEOUS ONCE
Refills: 0 | Status: COMPLETED | OUTPATIENT
Start: 2019-09-25 | End: 2019-09-25

## 2019-09-25 RX ORDER — ENOXAPARIN SODIUM 100 MG/ML
60 INJECTION SUBCUTANEOUS EVERY 12 HOURS
Refills: 0 | Status: COMPLETED | OUTPATIENT
Start: 2019-09-25 | End: 2019-09-30

## 2019-09-25 RX ORDER — INSULIN LISPRO 100/ML
5 VIAL (ML) SUBCUTANEOUS
Refills: 0 | Status: DISCONTINUED | OUTPATIENT
Start: 2019-09-25 | End: 2019-10-05

## 2019-09-25 RX ORDER — INSULIN HUMAN 100 [IU]/ML
10 INJECTION, SOLUTION SUBCUTANEOUS ONCE
Refills: 0 | Status: COMPLETED | OUTPATIENT
Start: 2019-09-25 | End: 2019-09-25

## 2019-09-25 RX ORDER — INSULIN LISPRO 100/ML
10 VIAL (ML) SUBCUTANEOUS ONCE
Refills: 0 | Status: COMPLETED | OUTPATIENT
Start: 2019-09-25 | End: 2019-09-25

## 2019-09-25 RX ORDER — ESMOLOL HCL 100MG/10ML
50 VIAL (ML) INTRAVENOUS
Qty: 2500 | Refills: 0 | Status: DISCONTINUED | OUTPATIENT
Start: 2019-09-25 | End: 2019-09-25

## 2019-09-25 RX ADMIN — ENOXAPARIN SODIUM 60 MILLIGRAM(S): 100 INJECTION SUBCUTANEOUS at 17:06

## 2019-09-25 RX ADMIN — Medication 0.25 MILLIGRAM(S): at 05:06

## 2019-09-25 RX ADMIN — Medication 20 MILLIGRAM(S): at 05:05

## 2019-09-25 RX ADMIN — Medication 10 UNIT(S): at 09:49

## 2019-09-25 RX ADMIN — Medication 650 MILLIGRAM(S): at 19:15

## 2019-09-25 RX ADMIN — INSULIN GLARGINE 15 UNIT(S): 100 INJECTION, SOLUTION SUBCUTANEOUS at 21:32

## 2019-09-25 RX ADMIN — Medication 81 MILLIGRAM(S): at 11:59

## 2019-09-25 RX ADMIN — DOPAMINE HYDROCHLORIDE 11.42 MICROGRAM(S)/KG/MIN: 40 INJECTION, SOLUTION, CONCENTRATE INTRAVENOUS at 15:30

## 2019-09-25 RX ADMIN — Medication 40 MILLIGRAM(S): at 08:31

## 2019-09-25 RX ADMIN — INSULIN HUMAN 10 UNIT(S): 100 INJECTION, SOLUTION SUBCUTANEOUS at 10:48

## 2019-09-25 RX ADMIN — AMIODARONE HYDROCHLORIDE 200 MILLIGRAM(S): 400 TABLET ORAL at 05:06

## 2019-09-25 RX ADMIN — Medication 0.12 MILLIGRAM(S): at 21:26

## 2019-09-25 RX ADMIN — Medication 5 UNIT(S): at 17:07

## 2019-09-25 RX ADMIN — CHLORHEXIDINE GLUCONATE 15 MILLILITER(S): 213 SOLUTION TOPICAL at 17:07

## 2019-09-25 RX ADMIN — Medication 125 MILLIGRAM(S): at 23:48

## 2019-09-25 RX ADMIN — SODIUM CHLORIDE 150 MILLILITER(S): 9 INJECTION INTRAMUSCULAR; INTRAVENOUS; SUBCUTANEOUS at 20:58

## 2019-09-25 RX ADMIN — CEFEPIME 100 MILLIGRAM(S): 1 INJECTION, POWDER, FOR SOLUTION INTRAMUSCULAR; INTRAVENOUS at 14:27

## 2019-09-25 RX ADMIN — Medication 5 UNIT(S): at 11:58

## 2019-09-25 RX ADMIN — Medication 0.25 MILLIGRAM(S): at 17:06

## 2019-09-25 RX ADMIN — CEFEPIME 100 MILLIGRAM(S): 1 INJECTION, POWDER, FOR SOLUTION INTRAMUSCULAR; INTRAVENOUS at 05:04

## 2019-09-25 RX ADMIN — Medication 3: at 11:59

## 2019-09-25 RX ADMIN — SODIUM CHLORIDE 250 MILLILITER(S): 9 INJECTION INTRAMUSCULAR; INTRAVENOUS; SUBCUTANEOUS at 18:07

## 2019-09-25 RX ADMIN — Medication 18.27 MICROGRAM(S)/KG/MIN: at 08:35

## 2019-09-25 RX ADMIN — HEPARIN SODIUM 5000 UNIT(S): 5000 INJECTION INTRAVENOUS; SUBCUTANEOUS at 05:04

## 2019-09-25 RX ADMIN — Medication 125 MILLIGRAM(S): at 11:29

## 2019-09-25 RX ADMIN — PANTOPRAZOLE SODIUM 40 MILLIGRAM(S): 20 TABLET, DELAYED RELEASE ORAL at 08:17

## 2019-09-25 RX ADMIN — Medication 650 MILLIGRAM(S): at 18:29

## 2019-09-25 RX ADMIN — CEFEPIME 100 MILLIGRAM(S): 1 INJECTION, POWDER, FOR SOLUTION INTRAMUSCULAR; INTRAVENOUS at 21:26

## 2019-09-25 RX ADMIN — Medication 1: at 17:08

## 2019-09-25 RX ADMIN — ATORVASTATIN CALCIUM 80 MILLIGRAM(S): 80 TABLET, FILM COATED ORAL at 21:26

## 2019-09-25 RX ADMIN — AMIODARONE HYDROCHLORIDE 200 MILLIGRAM(S): 400 TABLET ORAL at 17:06

## 2019-09-25 RX ADMIN — Medication 125 MILLIGRAM(S): at 17:06

## 2019-09-25 RX ADMIN — CHLORHEXIDINE GLUCONATE 1 APPLICATION(S): 213 SOLUTION TOPICAL at 05:04

## 2019-09-25 RX ADMIN — Medication 4 UNIT(S): at 08:16

## 2019-09-25 RX ADMIN — Medication 5: at 08:17

## 2019-09-25 NOTE — PROGRESS NOTE ADULT - ATTENDING COMMENTS
Patient seen and examined independently. I agree with the resident's note, physical exam, and plan except as below.  Vital Signs Last 24 Hrs  T(C): 36 (25 Sep 2019 20:00), Max: 36.6 (25 Sep 2019 00:00)  T(F): 96.8 (25 Sep 2019 20:00), Max: 97.9 (25 Sep 2019 00:00)  HR: 106 (25 Sep 2019 20:00) (90 - 138)  BP: 97/58 (25 Sep 2019 20:00) (70/50 - 110/75)  BP(mean): 74 (25 Sep 2019 20:00) (54 - 85)  RR: 28 (25 Sep 2019 20:00) (21 - 33)  SpO2: 99% (25 Sep 2019 20:00) (91% - 100%)  PE  nad  aaox3  bilat air entry basilar crackles  [id number]  soft ntnd_bs  no cce      #sp extubation - acute resp failure - pulmonary edema  #tachy, - was on dopamine this am - stopped - given IVF possible overdiuresis + diarrhea from cdiff - if bp still low can give lephoved  empirically tx with [id number] for possible PE, duplex negative  #cdiff - 4 episodes of diarrhea today - po vanco  # uncontrolled DM - hyperglycemia - improved  CAPILLARY BLOOD GLUCOSE  #afib RVR- on dig, amio  #NSTEMI- cath when medically stable   discussed with [facility] staff     POCT Blood Glucose.: 179 mg/dL (25 Sep 2019 17:02)  POCT Blood Glucose.: 183 mg/dL (25 Sep 2019 14:09)  POCT Blood Glucose.: 263 mg/dL (25 Sep 2019 11:25)  POCT Blood Glucose.: 300 mg/dL (25 Sep 2019 10:43)  POCT Blood Glucose.: 339 mg/dL (25 Sep 2019 09:36)  POCT Blood Glucose.: 360 mg/dL (25 Sep 2019 08:13)  POCT Blood Glucose.: 227 mg/dL (24 Sep 2019 21:28)

## 2019-09-25 NOTE — PROGRESS NOTE ADULT - SUBJECTIVE AND OBJECTIVE BOX
Subjective:  I have seen her few times from morning, initially bp 90/60 HR 120s, Esmolol started to control the HR, it droppedto 100, but BP also dropped very low, Esmolol was stopped and switched to Dopamine, BP did not get much changed but HR went up, developed diarrhea, test became positive for the C.Diff, bp kept dropping, HR remained high, BUN kathie up, acidosis with CO2 19 documented, rise in wbc to [id number]    MEDICATIONS  (STANDING):  ALPRAZolam 0.25 milliGRAM(s) Oral every 12 hours  amiodarone    Tablet 200 milliGRAM(s) Oral two times a day  aspirin  chewable 81 milliGRAM(s) Oral daily  atorvastatin 80 milliGRAM(s) Oral daily  cefepime   IVPB      cefepime   IVPB 1000 milliGRAM(s) IV Intermittent every 8 hours  chlorhexidine 0.12% Liquid 15 milliLiter(s) Oral Mucosa two times a day  chlorhexidine 4% Liquid 1 Application(s) Topical <User Schedule>  dextrose 5%. 1000 milliLiter(s) (50 mL/Hr) IV Continuous <Continuous>  dextrose 50% Injectable 12.5 Gram(s) IV Push once  dextrose 50% Injectable 25 Gram(s) IV Push once  dextrose 50% Injectable 25 Gram(s) IV Push once  digoxin     Tablet 0.125 milliGRAM(s) Oral daily  enoxaparin Injectable 60 milliGRAM(s) SubCutaneous every 12 hours  furosemide   Injectable 20 milliGRAM(s) IV Push every 12 hours  insulin glargine Injectable (LANTUS) 15 Unit(s) SubCutaneous at bedtime  insulin lispro (HumaLOG) corrective regimen sliding scale   SubCutaneous three times a day before meals  insulin lispro Injectable (HumaLOG) 5 Unit(s) SubCutaneous three times a day before meals  pantoprazole    Tablet 40 milliGRAM(s) Oral before breakfast  vancomycin    Solution 125 milliGRAM(s) Oral every 6 hours    MEDICATIONS  (PRN):  acetaminophen   Tablet .. 650 milliGRAM(s) Oral once PRN Moderate Pain (4 - 6)  dextrose 40% Gel 15 Gram(s) Oral once PRN Blood Glucose LESS THAN 70 milliGRAM(s)/deciliter  glucagon  Injectable 1 milliGRAM(s) IntraMuscular once PRN Glucose LESS THAN 70 milligrams/deciliter  morphine  - Injectable 2 milliGRAM(s) IV Push every 6 hours PRN Moderate Pain (4 - 6)            Vital Signs Last 24 Hrs  T(C): 35.7 (25 Sep 2019 12:00), Max: 36.6 (25 Sep 2019 00:00)  T(F): 96.3 (25 Sep 2019 12:00), Max: 97.9 (25 Sep 2019 00:00)  HR: 118 (25 Sep 2019 18:14) (90 - 138)  BP: 94/62 (25 Sep 2019 18:14) (70/50 - 100/55)  BP(mean): 71 (25 Sep 2019 18:14) (54 - 76)  RR: 28 (25 Sep 2019 18:14) (24 - 33)  SpO2: 96% (25 Sep 2019 18:14) (91% - 100%)             REVIEW OF SYSTEMS:  CONSTITUTIONAL: no fever, no chills, no diaphoresis, no complaint, asking me when she supposed to go home?  CARDIOLOGY: no chest pain, no SOB, no palpitation, no diaphoresis, but by taking gets mild tachypnea   RESPIRATORY: dyspnea, no wheeze, no orthopnea, no PND   NEUROLOGICAL: no dizziness, headache, focal deficits to report.  GI: no abdominal pain, no dyspepsia, no nausea, no vomiting, no diarrhea.    HEENT: no congestion, no nasal bleeding  SKIN: no ecchymosis, no petechia             PHYSICAL EXAM:  · CONSTITUTIONAL: she is sick and sick looking, no distress,   . NECK: Supple, no JVD,    · RESPIRATORY: Normal air entry to lung base, no wheeze, b/l crackle,   · CARDIOVASCULAR: S1, A2, P2, S3, no gallop, no murmur,   · EXTREMITIES: No cyanosis, no clubbing, no edema  · VASCULAR: Pulses are regular, equal, fast, bilateral in upper and lower extremities  	  TELEMETRY:    ECG:    TTE:    LABS:                        8.9    13.17 )-----------( 353      ( 25 Sep 2019 07:30 )             29.1     09-25    137  |  103  |  56<H>  ----------------------------<  184<H>  4.4   |  19  |  1.2    Ca    9.7      25 Sep 2019 17:06  Phos  3.4     09-25  Mg     2.0     09-25    TPro  6.2  /  Alb  3.2<L>  /  TBili  0.3  /  DBili  x   /  AST  21  /  ALT  65<H>  /  AlkPhos  202<H>  09-25            I&O's Summary    24 Sep 2019 07:01  -  25 Sep 2019 07:00  --------------------------------------------------------  IN: 700 mL / OUT: 1365 mL / NET: -665 mL    25 Sep 2019 07:01  -  25 Sep 2019 18:18  --------------------------------------------------------  IN: 663.3 mL / OUT: 200 mL / NET: 463.3 mL      BNP  RADIOLOGY & ADDITIONAL STUDIES: < from: Xray Chest 1 View- PORTABLE-Routine (09.25.19 @ 06:00) >  Worsened right basilar opacity. Stable left basilar opacity.     < end of copied text >  < from: [facility] Duplex Lower Ext Vein Scan, Bilat (09.25.19 @ 13:56) >    No evidence of deep venous thrombosis or superficial thrombophlebitis in   the bilateral lower extremities.    < end of copied text >      IMPRESSION AND PLAN:

## 2019-09-25 NOTE — PROGRESS NOTE ADULT - ASSESSMENT
anemia  acidosis, high BUN/Cr ratio  leukocytosis, diarrhea, C.Diff positive  CHF, severe systolic LV dysfunction  Pulmonary edema, post extubation, right lower lobe getting congested  Hypotension, drop in urine out put, pre renal state, low CO state, post anterior MI (by ECG)  patient is in unstable critical condition    Initially I scheduled her for the cardiac cath at AM, but considering unstable vital sign and hemodynamics, C.Diff and current condition, will try to stabilize her first    She has a very dynamic volatile condition, latest problem is tachycardia, hypotension, olyguria, C.Diff enteritis  suggest hemodynamic check, if IV volume depletion-sepsis picture is confirmed, then give 150-200 cc iv bolus of n/s in 1 hours, re monitor and give 100- 150 cc IV fluid PRN, f/u with BP check, d/c Dopamine (not very effective in rising BP or cardiac out put, caused tachycardia), then start Levophed if BP still is low  at this point: Amiodarone, Digoxin, Antibiotic, Small volume of N/S iv infusion to keep her in positive balance for the next 24 hours, Levophed if BP remained low and f/u with the urine out put

## 2019-09-25 NOTE — PROGRESS NOTE ADULT - SUBJECTIVE AND OBJECTIVE BOX
GUS WILBURN 68y Female  MRN#: [id number]   Hospital Day: 4d    SUBJECTIVE  Patient is a 68y old Female who presents with a chief complaint of mi, acute chf (25 Sep 2019 18:18)  Currently admitted to medicine with the primary diagnosis of Chest pain    INTERVAL HPI AND OVERNIGHT EVENTS:  Patient was examined and seen at bedside. This morning she is resting comfortably in bed. Overnight, patient endorses multiple loose watery bowel movements.    In the morning, patient was tachycardic to 120s with MAP hovering around 65 off pressors. Patient was started on esmolol drop to reduce heart rate. This was then switched to dopamine. The MAP remained around 65 but HR increased to 130s and 140s. Discussed with Dr. Carranza, and decision was made to perform a CHEETAH to check for hemodynamics. Patient found to be fluid responsive, so patient was given total of 250mL 0.9% NS over 1.5 hours. MAP now holding in 70s and BP in 100s and 110s.    REVIEW OF SYMPTOMS:  CONSTITUTIONAL: No weakness, fevers or chills; No headaches; (+) sweating  EYES: No visual changes, eye pain, or discharge  ENT: No vertigo; No ear pain or change in hearing; No sore throat or difficulty swallowing  NECK: No pain or stiffness  RESPIRATORY: No cough, wheezing, or hemoptysis; No shortness of breath  CARDIOVASCULAR: No chest pain or palpitations  GASTROINTESTINAL: (+) abdominal pain; No nausea, vomiting, or hematemesis; (+) watery diarrhea; No melena or hematochezia  GENITOURINARY: No dysuria, frequency or hematuria  MUSCULOSKELETAL: (+) back pain, no weakness  NEUROLOGICAL: No numbness or weakness  SKIN: No itching or rashes      OBJECTIVE  PAST MEDICAL & SURGICAL HISTORY  Female bladder prolapse  Diabetes  History of repair of hip fracture    ALLERGIES:  No Known Allergies    MEDICATIONS:  STANDING MEDICATIONS  ALPRAZolam 0.25 milliGRAM(s) Oral every 12 hours  amiodarone    Tablet 200 milliGRAM(s) Oral two times a day  aspirin  chewable 81 milliGRAM(s) Oral daily  atorvastatin 80 milliGRAM(s) Oral daily  cefepime   IVPB      cefepime   IVPB 1000 milliGRAM(s) IV Intermittent every 8 hours  chlorhexidine 0.12% Liquid 15 milliLiter(s) Oral Mucosa two times a day  chlorhexidine 4% Liquid 1 Application(s) Topical <User Schedule>  dextrose 5%. 1000 milliLiter(s) IV Continuous <Continuous>  dextrose 50% Injectable 12.5 Gram(s) IV Push once  dextrose 50% Injectable 25 Gram(s) IV Push once  dextrose 50% Injectable 25 Gram(s) IV Push once  digoxin     Tablet 0.125 milliGRAM(s) Oral daily  enoxaparin Injectable 60 milliGRAM(s) SubCutaneous every 12 hours  furosemide   Injectable 20 milliGRAM(s) IV Push every 12 hours  insulin glargine Injectable (LANTUS) 15 Unit(s) SubCutaneous at bedtime  insulin lispro (HumaLOG) corrective regimen sliding scale   SubCutaneous three times a day before meals  insulin lispro Injectable (HumaLOG) 5 Unit(s) SubCutaneous three times a day before meals  pantoprazole    Tablet 40 milliGRAM(s) Oral before breakfast  vancomycin    Solution 125 milliGRAM(s) Oral every 6 hours    PRN MEDICATIONS  dextrose 40% Gel 15 Gram(s) Oral once PRN  glucagon  Injectable 1 milliGRAM(s) IntraMuscular once PRN  morphine  - Injectable 2 milliGRAM(s) IV Push every 6 hours PRN      VITAL SIGNS: Last 24 Hours  T(C): 36.1 (25 Sep 2019 18:14), Max: 36.6 (25 Sep 2019 00:00)  T(F): 97 (25 Sep 2019 18:14), Max: 97.9 (25 Sep 2019 00:00)  HR: 104 (25 Sep 2019 19:00) (90 - 138)  BP: 110/75 (25 Sep 2019 19:00) (70/50 - 110/75)  BP(mean): 85 (25 Sep 2019 19:00) (54 - 85)  RR: 21 (25 Sep 2019 19:00) (21 - 33)  SpO2: 99% (25 Sep 2019 19:00) (91% - 100%)    LABS:                        8.9    13.17 )-----------( 353      ( 25 Sep 2019 07:30 )             29.1         137  |  103  |  56<H>  ----------------------------<  184<H>  4.4   |  19  |  1.2    Ca    9.7      25 Sep 2019 17:06  Phos  3.4     -  Mg     2.0         TPro  6.2  /  Alb  3.2<L>  /  TBili  0.3  /  DBili  x   /  AST  21  /  ALT  65<H>  /  AlkPhos  202<H>        Urinalysis Basic - ( 25 Sep 2019 09:35 )    Color: Yellow / Appearance: Clear / S.020 / pH: x  Gluc: x / Ketone: Small  / Bili: Negative / Urobili: <2 mg/dL   Blood: x / Protein: 30 mg/dL / Nitrite: Negative   Leuk Esterase: Moderate / RBC: 3 /HPF / WBC 15 /HPF   Sq Epi: x / Non Sq Epi: 6 /HPF / Bacteria: Negative      ABG - ( 25 Sep 2019 10:14 )  pH, Arterial: 7.44  pH, Blood: x     /  pCO2: 28    /  pO2: 58    / HCO3: 19    / Base Excess: -4.5  /  SaO2: 90          RADIOLOGY:  < from: Xray Chest 1 View- PORTABLE-Routine (19 @ 06:00) >  INTERPRETATION:  Clinical History / Reason for exam: Post extubation.    Comparison : Chest radiograph 2019.    Technique/Positioning: Single frontal view ofthe chest.    Findings:    Support devices: Post extubation and removal of gastric tube and left IJ   central venous catheter.    Cardiac/mediastinum/hilum: Stable.    Lung parenchyma/Pleura: Worsened right basilar opacity. Stable left   basilar opacity. No pneumothorax.    Skeleton/soft tissues: Stable.    Impression:      Worsened right basilar opacity. Stable left basilar opacity.     < end of copied text >  < from: [facility] Duplex Lower Ext Vein Scan, Bilat (19 @ 13:56) >  INTERPRETATION:  Clinical History / Reason for exam: The patient is a   68-year-old female with shortness of breath. A venous duplex examination   was performedto evaluate the patient for deep venous thrombosis of the   lower extremities.    The common femoral, great saphenous, femoral, popliteal and small   saphenous veins were visualized bilaterally with no evidence of deep   venous thrombosis    All veins were fully compressible.  There was presence of spontaneous   flow, augmentation with distal compression and phasicity.    The anterior tibial veins were  patent     The posterior tibial veins were  patent      The peroneal veins were patent.    Impression:    No evidence of deep venous thrombosis or superficial thrombophlebitis in   the bilateral lower extremities.    < end of copied text >      PHYSICAL EXAM:  CONSTITUTIONAL: Mildly distressed, diaphoretic, AAOx3  HEAD: Atraumatic, normocephalic  EYES: EOM intact, PERRLA, conjunctiva and sclera clear  ENT: Supple, no masses, no thyromegaly, no bruits, no JVD; moist mucous membranes  PULMONARY: (+) Crackles bilaterally  CARDIOVASCULAR: Regular rhythm; (+) tachycardia; no murmurs, rubs, or gallops  GASTROINTESTINAL: Soft, non-tender, non-distended; bowel sounds present  MUSCULOSKELETAL: 2+ peripheral pulses; no clubbing, no cyanosis, no edema  NEUROLOGY: non-focal  SKIN: No rashes or lesions; warm and dry    ASSESSMENT & PLAN  Patient is a 67yo female with PMHx of diabetes mellitus, paroxysmal atrial fibrillation, history of lower GI bleed and recently admitted for hip fracture who presented with acute hypoxic respiratory failure secondary to acute on chronic HFrEF s/p intubation. Found to have NSTEMI.    #Acute hypoxic respiratory failure s/p extubation, resolving  - Patient saturating well on 3L O2 via nasal cannula  - Chest X-ray shows worsening right basilar opacity with stable left basilar opacity  - Repeat chest X-ray in AM  - Continue with cefepime 1g IV Q8H    #NSTEMI  - Cardiology consult appreciated  - ECG today shows no R waves in precordial leads, which signifies LAD lesion  - Continue with atorvastatin 80mg PO QD  - Continue with aspirin 81mg PO QD  - Will schedule for cardiac cath after stabilization of BP and HR  - Difficult to determine possible benefit from cath at this time    #C. diff enterocolitis  - Patient endorses multiple watery bowel movements overnight  - C. diff PCR positive on 2019  - Discontinue senna and colace  - Start and continue vancomycin 125mg PO Q6H for 10 days    #Diabetic ketoacidosis, resolving  - Anion gap on 2019 in AM was 21  - Beta hydroxybutyrate was 3.6  - Lactate 1.3  - Glucose elevated to 300s  - Administered 10 units of insulin lispro then 10 units of regular insulin  - Sugars are now well controlled  - Anion gap closed  - Potassium within normal limits  - Increased insulin glargine from 12 units to 15 units SQ QHS  - Increased insulin lispro from 4 units to 5 units SQ TID with meals  - Continue with insulin sliding scale  - Monitor finger stick glucose and BMP    #Sinus tachycardia, rule out pulmonary embolism  - Patient has history of hip fracture approximately 6 weeks ago  - Has been bedridden during this admission  - [facility] Duplex: no evidence of DVT bilaterally  - Discontinue heparin  - Continue with Lovenox 60mg SQ Q12H    #Acute on chronic HFrEF exacerbation, resolving  - Cardiology consult appreciated  - Continue with furosemide 20mg IV BID  - Continue with Lovenox 60mg SQ Q12H  - Will schedule for cardiac cath after stabilization of BP  - Keep Mg>2 and K>4  - Patient may require Lifevest prior to discharge based on cath results    #Paroxysmal atrial fibrillation with RVR, resolved  - Patient is in sinus tachycardia  - Cardiology consult appreciated  - Continue with amiodarone 200mg PO BID  - Continue with digoxin 0.125mg PO QD  - EP consult appreciated  - QTc today was 422ms  - Daily EKG to evaluate QTc    #Possible urinary tract infection  - Patient has indwelling Castellon catheter  - Urine culture from 2019: no growth to date  - Continue with cefepime 1g IV Q8H  - In order to obtain urine culture, patient would require Castellon replacement. However, seal is broken and patient has difficult anatomy due to uterine prolapse    #Transaminitis, resolving  - Likely secondary to congestive hepatopathy  - No RUQ pain on exam  - LFTs continue to trend down  - Monitor LFTs    #Anxiety  - Continue with Xanax 0.25mg PO Q12H    #Misc  - DVT Prophylaxis: Lovenox 60mg SQ Q12H  - Diet: Dysphagia 3 diet with thin liquids, DASH/TLC, consistent carbohydrates with evening snack  - GI Prophylaxis: Pantoprazole 40mg PO QD  - Activity: Increase as tolerated  - IV Fluids: Encourage PO intake  - Code Status: FULL CODE    Dispo: From home

## 2019-09-26 DIAGNOSIS — R33.9 RETENTION OF URINE, UNSPECIFIED: ICD-10-CM

## 2019-09-26 DIAGNOSIS — N95.2 POSTMENOPAUSAL ATROPHIC VAGINITIS: ICD-10-CM

## 2019-09-26 DIAGNOSIS — N81.3 COMPLETE UTEROVAGINAL PROLAPSE: ICD-10-CM

## 2019-09-26 LAB
ALBUMIN SERPL ELPH-MCNC: 2.9 G/DL — LOW (ref 3.5–5.2)
ALP SERPL-CCNC: 169 U/L — HIGH (ref 30–115)
ALT FLD-CCNC: 47 U/L — HIGH (ref 0–41)
ANION GAP SERPL CALC-SCNC: 14 MMOL/L — SIGNIFICANT CHANGE UP (ref 7–14)
ANION GAP SERPL CALC-SCNC: 16 MMOL/L — HIGH (ref 7–14)
ANION GAP SERPL CALC-SCNC: 18 MMOL/L — HIGH (ref 7–14)
AST SERPL-CCNC: 15 U/L — SIGNIFICANT CHANGE UP (ref 0–41)
B-OH-BUTYR SERPL-SCNC: 0.4 MMOL/L — SIGNIFICANT CHANGE UP
B-OH-BUTYR SERPL-SCNC: 1.8 MMOL/L — HIGH
BASE EXCESS BLDA CALC-SCNC: -4.3 MMOL/L — LOW (ref -2–2)
BILIRUB SERPL-MCNC: 0.3 MG/DL — SIGNIFICANT CHANGE UP (ref 0.2–1.2)
BUN SERPL-MCNC: 59 MG/DL — HIGH (ref 10–20)
BUN SERPL-MCNC: 59 MG/DL — HIGH (ref 10–20)
BUN SERPL-MCNC: 61 MG/DL — CRITICAL HIGH (ref 10–20)
CALCIUM SERPL-MCNC: 9.3 MG/DL — SIGNIFICANT CHANGE UP (ref 8.5–10.1)
CALCIUM SERPL-MCNC: 9.5 MG/DL — SIGNIFICANT CHANGE UP (ref 8.5–10.1)
CALCIUM SERPL-MCNC: 9.6 MG/DL — SIGNIFICANT CHANGE UP (ref 8.5–10.1)
CHLORIDE SERPL-SCNC: 101 MMOL/L — SIGNIFICANT CHANGE UP (ref 98–110)
CHLORIDE SERPL-SCNC: 102 MMOL/L — SIGNIFICANT CHANGE UP (ref 98–110)
CHLORIDE SERPL-SCNC: 103 MMOL/L — SIGNIFICANT CHANGE UP (ref 98–110)
CO2 SERPL-SCNC: 18 MMOL/L — SIGNIFICANT CHANGE UP (ref 17–32)
CO2 SERPL-SCNC: 18 MMOL/L — SIGNIFICANT CHANGE UP (ref 17–32)
CO2 SERPL-SCNC: 19 MMOL/L — SIGNIFICANT CHANGE UP (ref 17–32)
CREAT SERPL-MCNC: 1.1 MG/DL — SIGNIFICANT CHANGE UP (ref 0.7–1.5)
CREAT SERPL-MCNC: 1.2 MG/DL — SIGNIFICANT CHANGE UP (ref 0.7–1.5)
CREAT SERPL-MCNC: 1.2 MG/DL — SIGNIFICANT CHANGE UP (ref 0.7–1.5)
GLUCOSE BLDC GLUCOMTR-MCNC: 140 MG/DL — HIGH (ref 70–99)
GLUCOSE BLDC GLUCOMTR-MCNC: 177 MG/DL — HIGH (ref 70–99)
GLUCOSE BLDC GLUCOMTR-MCNC: 183 MG/DL — HIGH (ref 70–99)
GLUCOSE BLDC GLUCOMTR-MCNC: 200 MG/DL — HIGH (ref 70–99)
GLUCOSE SERPL-MCNC: 162 MG/DL — HIGH (ref 70–99)
GLUCOSE SERPL-MCNC: 167 MG/DL — HIGH (ref 70–99)
GLUCOSE SERPL-MCNC: 174 MG/DL — HIGH (ref 70–99)
HCO3 BLDA-SCNC: 19 MMOL/L — LOW (ref 23–27)
HCT VFR BLD CALC: 29.1 % — LOW (ref 37–47)
HGB BLD-MCNC: 9.1 G/DL — LOW (ref 12–16)
LACTATE SERPL-SCNC: 1 MMOL/L — SIGNIFICANT CHANGE UP (ref 0.5–2.2)
MAGNESIUM SERPL-MCNC: 2.1 MG/DL — SIGNIFICANT CHANGE UP (ref 1.8–2.4)
MCHC RBC-ENTMCNC: 25.7 PG — LOW (ref 27–31)
MCHC RBC-ENTMCNC: 31.3 G/DL — LOW (ref 32–37)
MCV RBC AUTO: 82.2 FL — SIGNIFICANT CHANGE UP (ref 81–99)
NRBC # BLD: 0 /100 WBCS — SIGNIFICANT CHANGE UP (ref 0–0)
PCO2 BLDA: 26 MMHG — LOW (ref 38–42)
PH BLDA: 7.46 — HIGH (ref 7.38–7.42)
PHOSPHATE SERPL-MCNC: 3.3 MG/DL — SIGNIFICANT CHANGE UP (ref 2.1–4.9)
PLATELET # BLD AUTO: 330 K/UL — SIGNIFICANT CHANGE UP (ref 130–400)
PO2 BLDA: 70 MMHG — LOW (ref 78–95)
POTASSIUM SERPL-MCNC: 4.2 MMOL/L — SIGNIFICANT CHANGE UP (ref 3.5–5)
POTASSIUM SERPL-SCNC: 4.2 MMOL/L — SIGNIFICANT CHANGE UP (ref 3.5–5)
PROT SERPL-MCNC: 5.5 G/DL — LOW (ref 6–8)
RBC # BLD: 3.54 M/UL — LOW (ref 4.2–5.4)
RBC # FLD: 14.3 % — SIGNIFICANT CHANGE UP (ref 11.5–14.5)
SAO2 % BLDA: 94 % — SIGNIFICANT CHANGE UP (ref 94–98)
SODIUM SERPL-SCNC: 135 MMOL/L — SIGNIFICANT CHANGE UP (ref 135–146)
SODIUM SERPL-SCNC: 136 MMOL/L — SIGNIFICANT CHANGE UP (ref 135–146)
SODIUM SERPL-SCNC: 138 MMOL/L — SIGNIFICANT CHANGE UP (ref 135–146)
WBC # BLD: 10.09 K/UL — SIGNIFICANT CHANGE UP (ref 4.8–10.8)
WBC # FLD AUTO: 10.09 K/UL — SIGNIFICANT CHANGE UP (ref 4.8–10.8)

## 2019-09-26 PROCEDURE — 71045 X-RAY EXAM CHEST 1 VIEW: CPT | Mod: 26

## 2019-09-26 PROCEDURE — 93010 ELECTROCARDIOGRAM REPORT: CPT

## 2019-09-26 PROCEDURE — 99233 SBSQ HOSP IP/OBS HIGH 50: CPT

## 2019-09-26 PROCEDURE — 99497 ADVNCD CARE PLAN 30 MIN: CPT | Mod: 25

## 2019-09-26 RX ORDER — LIDOCAINE 4 G/100G
1 CREAM TOPICAL DAILY
Refills: 0 | Status: DISCONTINUED | OUTPATIENT
Start: 2019-09-26 | End: 2019-09-28

## 2019-09-26 RX ORDER — ACETAMINOPHEN 500 MG
650 TABLET ORAL ONCE
Refills: 0 | Status: COMPLETED | OUTPATIENT
Start: 2019-09-26 | End: 2019-09-26

## 2019-09-26 RX ORDER — ALPRAZOLAM 0.25 MG
0.25 TABLET ORAL ONCE
Refills: 0 | Status: DISCONTINUED | OUTPATIENT
Start: 2019-09-26 | End: 2019-09-26

## 2019-09-26 RX ORDER — FUROSEMIDE 40 MG
20 TABLET ORAL ONCE
Refills: 0 | Status: COMPLETED | OUTPATIENT
Start: 2019-09-26 | End: 2019-09-26

## 2019-09-26 RX ORDER — ALPRAZOLAM 0.25 MG
0.5 TABLET ORAL
Refills: 0 | Status: DISCONTINUED | OUTPATIENT
Start: 2019-09-26 | End: 2019-09-30

## 2019-09-26 RX ORDER — ONDANSETRON 8 MG/1
4 TABLET, FILM COATED ORAL ONCE
Refills: 0 | Status: DISCONTINUED | OUTPATIENT
Start: 2019-09-26 | End: 2019-10-07

## 2019-09-26 RX ORDER — DIPHENHYDRAMINE HCL 50 MG
25 CAPSULE ORAL ONCE
Refills: 0 | Status: COMPLETED | OUTPATIENT
Start: 2019-09-26 | End: 2019-09-26

## 2019-09-26 RX ADMIN — ENOXAPARIN SODIUM 60 MILLIGRAM(S): 100 INJECTION SUBCUTANEOUS at 17:02

## 2019-09-26 RX ADMIN — AMIODARONE HYDROCHLORIDE 200 MILLIGRAM(S): 400 TABLET ORAL at 17:02

## 2019-09-26 RX ADMIN — CEFEPIME 100 MILLIGRAM(S): 1 INJECTION, POWDER, FOR SOLUTION INTRAMUSCULAR; INTRAVENOUS at 12:56

## 2019-09-26 RX ADMIN — Medication 20 MILLIGRAM(S): at 17:03

## 2019-09-26 RX ADMIN — INSULIN GLARGINE 15 UNIT(S): 100 INJECTION, SOLUTION SUBCUTANEOUS at 22:10

## 2019-09-26 RX ADMIN — Medication 650 MILLIGRAM(S): at 15:41

## 2019-09-26 RX ADMIN — Medication 0.12 MILLIGRAM(S): at 22:10

## 2019-09-26 RX ADMIN — CEFEPIME 100 MILLIGRAM(S): 1 INJECTION, POWDER, FOR SOLUTION INTRAMUSCULAR; INTRAVENOUS at 22:10

## 2019-09-26 RX ADMIN — Medication 0.25 MILLIGRAM(S): at 13:13

## 2019-09-26 RX ADMIN — ENOXAPARIN SODIUM 60 MILLIGRAM(S): 100 INJECTION SUBCUTANEOUS at 05:14

## 2019-09-26 RX ADMIN — Medication 0.5 MILLIGRAM(S): at 17:03

## 2019-09-26 RX ADMIN — Medication 81 MILLIGRAM(S): at 11:58

## 2019-09-26 RX ADMIN — LIDOCAINE 1 PATCH: 4 CREAM TOPICAL at 21:51

## 2019-09-26 RX ADMIN — AMIODARONE HYDROCHLORIDE 200 MILLIGRAM(S): 400 TABLET ORAL at 05:13

## 2019-09-26 RX ADMIN — Medication 1: at 11:59

## 2019-09-26 RX ADMIN — Medication 5 UNIT(S): at 17:05

## 2019-09-26 RX ADMIN — ATORVASTATIN CALCIUM 80 MILLIGRAM(S): 80 TABLET, FILM COATED ORAL at 22:10

## 2019-09-26 RX ADMIN — Medication 125 MILLIGRAM(S): at 11:58

## 2019-09-26 RX ADMIN — Medication 1: at 17:05

## 2019-09-26 RX ADMIN — Medication 125 MILLIGRAM(S): at 05:13

## 2019-09-26 RX ADMIN — Medication 5 UNIT(S): at 09:06

## 2019-09-26 RX ADMIN — Medication 125 MILLIGRAM(S): at 17:03

## 2019-09-26 RX ADMIN — CEFEPIME 100 MILLIGRAM(S): 1 INJECTION, POWDER, FOR SOLUTION INTRAMUSCULAR; INTRAVENOUS at 05:13

## 2019-09-26 RX ADMIN — PANTOPRAZOLE SODIUM 40 MILLIGRAM(S): 20 TABLET, DELAYED RELEASE ORAL at 09:06

## 2019-09-26 RX ADMIN — CHLORHEXIDINE GLUCONATE 1 APPLICATION(S): 213 SOLUTION TOPICAL at 05:13

## 2019-09-26 RX ADMIN — Medication 0.25 MILLIGRAM(S): at 05:13

## 2019-09-26 RX ADMIN — Medication 1: at 09:07

## 2019-09-26 RX ADMIN — Medication 5 UNIT(S): at 11:59

## 2019-09-26 RX ADMIN — LIDOCAINE 1 PATCH: 4 CREAM TOPICAL at 10:37

## 2019-09-26 RX ADMIN — Medication 20 MILLIGRAM(S): at 05:14

## 2019-09-26 NOTE — PROGRESS NOTE ADULT - SUBJECTIVE AND OBJECTIVE BOX
HOSPITALIST ATTENDING NOTE    GUS WILBURN  68y Female  [id number]    INTERVAL HPI/OVERNIGHT EVENTS: bp and hr better    T(C): 36 (09-26-19 @ 07:42), Max: 36.1 (09-25-19 @ 18:14)  HR: 112 (09-26-19 @ 12:00) (102 - 138)  BP: 101/63 (09-26-19 @ 10:36) (78/55 - 110/75)  RR: 28 (09-26-19 @ 12:00) (21 - 33)  SpO2: 95% (09-26-19 @ 12:00) (91% - 99%)  Wt(kg): --    09-25-19 @ 07:01  -  09-26-19 @ 07:00  --------------------------------------------------------  IN: 943.3 mL / OUT: 460 mL / NET: 483.3 mL    09-26-19 @ 07:01  -  09-26-19 @ 15:08  --------------------------------------------------------  IN: 0 mL / OUT: 215 mL / NET: -215 mL    QUIN  nad  aaox3  bilat air entry basilar crackles  [id number]  soft ntnd_bs  no cce      Consultant(s) Notes Reviewed:  [x ] YES  [ ] NO  Care Discussed with Consultants/Other Providers/ Housestaff [ x] YES  [ ] NO    LABS:                        9.1    10.09 )-----------( 330      ( 26 Sep 2019 04:29 )             29.1     09-26    138  |  102  |  59<H>  ----------------------------<  174<H>  4.2   |  18  |  1.2    Ca    9.6      26 Sep 2019 04:29  Phos  3.3     09-26  Mg     2.1     09-26    TPro  5.5<L>  /  Alb  2.9<L>  /  TBili  0.3  /  DBili  x   /  AST  15  /  ALT  47<H>  /  AlkPhos  169<H>  09-26          RADIOLOGY & ADDITIONAL TESTS:    Imaging or report Personally Reviewed:  [ ] YES  [ ] NO    Case discussed with resident    Care discussed with pt/family      HEALTH ISSUES - PROBLEM Dx:

## 2019-09-26 NOTE — PROGRESS NOTE ADULT - SUBJECTIVE AND OBJECTIVE BOX
SUBJ: Short of the breath      MEDICATIONS  (STANDING):  ALPRAZolam 0.5 milliGRAM(s) Oral two times a day  amiodarone    Tablet 200 milliGRAM(s) Oral two times a day  aspirin  chewable 81 milliGRAM(s) Oral daily  atorvastatin 80 milliGRAM(s) Oral daily  cefepime   IVPB      cefepime   IVPB 1000 milliGRAM(s) IV Intermittent every 8 hours  chlorhexidine 4% Liquid 1 Application(s) Topical <User Schedule>  dextrose 5%. 1000 milliLiter(s) (50 mL/Hr) IV Continuous <Continuous>  dextrose 50% Injectable 12.5 Gram(s) IV Push once  dextrose 50% Injectable 25 Gram(s) IV Push once  dextrose 50% Injectable 25 Gram(s) IV Push once  digoxin     Tablet 0.125 milliGRAM(s) Oral daily  enoxaparin Injectable 60 milliGRAM(s) SubCutaneous every 12 hours  insulin glargine Injectable (LANTUS) 15 Unit(s) SubCutaneous at bedtime  insulin lispro (HumaLOG) corrective regimen sliding scale   SubCutaneous three times a day before meals  insulin lispro Injectable (HumaLOG) 5 Unit(s) SubCutaneous three times a day before meals  ondansetron Injectable 4 milliGRAM(s) IV Push once  pantoprazole    Tablet 40 milliGRAM(s) Oral before breakfast  vancomycin    Solution 125 milliGRAM(s) Oral every 6 hours    MEDICATIONS  (PRN):  dextrose 40% Gel 15 Gram(s) Oral once PRN Blood Glucose LESS THAN 70 milliGRAM(s)/deciliter  glucagon  Injectable 1 milliGRAM(s) IntraMuscular once PRN Glucose LESS THAN 70 milligrams/deciliter  lidocaine   Patch 1 Patch Transdermal daily PRN Back pain  morphine  - Injectable 2 milliGRAM(s) IV Push every 6 hours PRN Moderate Pain (4 - 6)            Vital Signs Last 24 Hrs  T(C): 36 (26 Sep 2019 07:42), Max: 36.1 (26 Sep 2019 00:00)  T(F): 96.8 (26 Sep 2019 07:42), Max: 96.9 (26 Sep 2019 00:00)  HR: 102 (26 Sep 2019 18:06) (100 - 114)  BP: 88/51 (26 Sep 2019 18:06) (83/59 - 106/71)  BP(mean): 66 (26 Sep 2019 18:06) (65 - 82)  RR: 34 (26 Sep 2019 18:06) (23 - 54)  SpO2: 94% (26 Sep 2019 18:06) (94% - 99%)     REVIEW OF SYSTEMS:  CONSTITUTIONAL: No fever, weight loss, or fatigue  CARDIOLOGY: PAtient denies chest pain, shortness of breath or syncopal episodes.   RESPIRATORY: denies shortness of breath, wheezeing.   NEUROLOGICAL: NO weakness, no focal deficits to report.  ENDOCRINOLOGICAL: no recent change in diabetic medications.   GI: no BRBPR, no N,V,diarrhea.    PSYCHIATRY: normal mood and affect  HEENT: no nasal discharge, no ecchymosis  SKIN: no ecchymosis, no breakdown  MUSCULOSKELETAL: Full range of motion x4.        PHYSICAL EXAM:  · CONSTITUTIONAL:	Well-developed, well nourished    BMI-  ·RESPIRATORY:   airway patent; breath sounds equal; good air movement; respirations non-labored; clear to auscultation bilaterally; no chest wall tenderness; no intercostal retractions; no rales,rhonchi or wheeze  · CARDIOVASCULAR	regular rate and rhythm  no rub  no murmur  normal PMI  · EXTREMITIES: No cyanosis, clubbing or edema  · VASCULAR: 	Equal and normal pulses (carotid, femoral, dorsalis pedis)  	  TELEMETRY:    ECG:    TTE:    LABS:                        9.1    10.09 )-----------( 330      ( 26 Sep 2019 04:29 )             29.1     09-26    136  |  103  |  61<HH>  ----------------------------<  162<H>  4.2   |  19  |  1.1    Ca    9.3      26 Sep 2019 16:28  Phos  3.3     09-26  Mg     2.1     09-26    TPro  5.5<L>  /  Alb  2.9<L>  /  TBili  0.3  /  DBili  x   /  AST  15  /  ALT  47<H>  /  AlkPhos  169<H>  09-26            I&O's Summary    25 Sep 2019 07:01  -  26 Sep 2019 07:00  --------------------------------------------------------  IN: 943.3 mL / OUT: 460 mL / NET: 483.3 mL    26 Sep 2019 07:01  -  26 Sep 2019 20:14  --------------------------------------------------------  IN: 0 mL / OUT: 295 mL / NET: -295 mL      BNP  RADIOLOGY & ADDITIONAL STUDIES:    IMPRESSION AND PLAN:    Post hypoxic episode   post extubation successfully  for cardiac cath tomorrow  discussed with family and CCU team

## 2019-09-26 NOTE — PROGRESS NOTE ADULT - ASSESSMENT
#sp extubation - acute resp failure - pulmonary edema  #tachy, -improved - sp ivf challenge - off dopamine - did not require levophed  empirically tx with [id number] for possible PE, duplex negative  #cdiff - no bms noted cont  - po vanco  # uncontrolled DM - hyperglycemia - improved  CAPILLARY BLOOD GLUCOSE    POCT Blood Glucose.: 200 mg/dL (26 Sep 2019 11:13)  POCT Blood Glucose.: 183 mg/dL (26 Sep 2019 08:25)  POCT Blood Glucose.: 161 mg/dL (25 Sep 2019 21:29)  POCT Blood Glucose.: 179 mg/dL (25 Sep 2019 17:02)    #afib RVR- on dig, amio, on lovenox q12  #NSTEMI- cath when medically stable   discussed with ccu staff  and Dr Carranza this AM

## 2019-09-26 NOTE — PROGRESS NOTE ADULT - SUBJECTIVE AND OBJECTIVE BOX
GUS WILBURN 68y Female  MRN#: [id number]   Hospital Day: 5d    SUBJECTIVE  Patient is a 68y old Female who presents with a chief complaint of acute hypoxic respiratory failure (25 Sep 2019 19:47)  Currently admitted to medicine with the primary diagnosis of Chest pain    INTERVAL HPI AND OVERNIGHT EVENTS:  Patient was examined and seen at bedside. This morning she is resting comfortably in bed.    Yesterday, patient was tachycardic. Per cardiology, started on esmolol drip. MAP dropped to approximately 65 with HR in 120s. Esmolol was stopped, and patient was started on dopamine drip. MAP remained at approximately 65 with HR rising to 140s. Dopamine was discontinued. CHEETAH performed showed that patient was fluid responsive. Administered a total of 400mL 0.9% NS over approximately 3 hours. MAP improved to 70s and HR lowered to 100s.    Patient reports only one bowel movement overnight. Denies abdominal pain. Admits to anxiety.    Per family request, patient will now be followed by Dr. Mancia for cardiology.    REVIEW OF SYMPTOMS:  CONSTITUTIONAL: No weakness, fevers or chills; No headaches; (+) anxious  EYES: No visual changes, eye pain, or discharge  ENT: No vertigo; No ear pain or change in hearing; No sore throat or difficulty swallowing  NECK: No pain or stiffness  RESPIRATORY: No cough, wheezing, or hemoptysis; No shortness of breath  CARDIOVASCULAR: No chest pain or palpitations  GASTROINTESTINAL: No abdominal or epigastric pain; No nausea, vomiting, or hematemesis; No diarrhea or constipation; No melena or hematochezia  GENITOURINARY: No dysuria, frequency or hematuria  MUSCULOSKELETAL: No joint pain, no muscle pain, no weakness  NEUROLOGICAL: No numbness or weakness  SKIN: No itching or rashes      OBJECTIVE  PAST MEDICAL & SURGICAL HISTORY  Female bladder prolapse  Diabetes  History of repair of hip fracture    ALLERGIES:  No Known Allergies    MEDICATIONS:  STANDING MEDICATIONS  ALPRAZolam 0.5 milliGRAM(s) Oral two times a day  amiodarone    Tablet 200 milliGRAM(s) Oral two times a day  aspirin  chewable 81 milliGRAM(s) Oral daily  atorvastatin 80 milliGRAM(s) Oral daily  cefepime   IVPB      cefepime   IVPB 1000 milliGRAM(s) IV Intermittent every 8 hours  chlorhexidine 4% Liquid 1 Application(s) Topical <User Schedule>  dextrose 5%. 1000 milliLiter(s) IV Continuous <Continuous>  dextrose 50% Injectable 12.5 Gram(s) IV Push once  dextrose 50% Injectable 25 Gram(s) IV Push once  dextrose 50% Injectable 25 Gram(s) IV Push once  digoxin     Tablet 0.125 milliGRAM(s) Oral daily  enoxaparin Injectable 60 milliGRAM(s) SubCutaneous every 12 hours  insulin glargine Injectable (LANTUS) 15 Unit(s) SubCutaneous at bedtime  insulin lispro (HumaLOG) corrective regimen sliding scale   SubCutaneous three times a day before meals  insulin lispro Injectable (HumaLOG) 5 Unit(s) SubCutaneous three times a day before meals  ondansetron Injectable 4 milliGRAM(s) IV Push once  pantoprazole    Tablet 40 milliGRAM(s) Oral before breakfast  vancomycin    Solution 125 milliGRAM(s) Oral every 6 hours    PRN MEDICATIONS  dextrose 40% Gel 15 Gram(s) Oral once PRN  glucagon  Injectable 1 milliGRAM(s) IntraMuscular once PRN  lidocaine   Patch 1 Patch Transdermal daily PRN  morphine  - Injectable 2 milliGRAM(s) IV Push every 6 hours PRN      VITAL SIGNS: Last 24 Hours  T(C): 36 (26 Sep 2019 07:42), Max: 36.1 (25 Sep 2019 18:14)  T(F): 96.8 (26 Sep 2019 07:42), Max: 97 (25 Sep 2019 18:14)  HR: 112 (26 Sep 2019 12:00) (102 - 138)  BP: 101/63 (26 Sep 2019 10:36) (78/55 - 110/75)  BP(mean): 78 (26 Sep 2019 10:36) (57 - 85)  RR: 28 (26 Sep 2019 12:00) (21 - 33)  SpO2: 95% (26 Sep 2019 12:00) (91% - 99%)    LABS:                        9.1    10.09 )-----------( 330      ( 26 Sep 2019 04:29 )             29.1         138  |  102  |  59<H>  ----------------------------<  174<H>  4.2   |  18  |  1.2    Ca    9.6      26 Sep 2019 04:29  Phos  3.3       Mg     2.1         TPro  5.5<L>  /  Alb  2.9<L>  /  TBili  0.3  /  DBili  x   /  AST  15  /  ALT  47<H>  /  AlkPhos  169<H>        Urinalysis Basic - ( 25 Sep 2019 09:35 )    Color: Yellow / Appearance: Clear / S.020 / pH: x  Gluc: x / Ketone: Small  / Bili: Negative / Urobili: <2 mg/dL   Blood: x / Protein: 30 mg/dL / Nitrite: Negative   Leuk Esterase: Moderate / RBC: 3 /HPF / WBC 15 /HPF   Sq Epi: x / Non Sq Epi: 6 /HPF / Bacteria: Negative      ABG - ( 26 Sep 2019 07:20 )  pH, Arterial: 7.46  pH, Blood: x     /  pCO2: 26    /  pO2: 70    / HCO3: 19    / Base Excess: -4.3  /  SaO2: 94                Lactate, Blood: 1.0 mmol/L (19 @ 04:29)          RADIOLOGY:  < from: Xray Chest 1 View- PORTABLE-Routine (19 @ 04:49) >  INTERPRETATION:  Clinical History / Reason for exam: Congestive heart   failure.    Comparison : Chest radiograph 2019.    Technique/Positioning: Single frontal view of the chest.    Findings:    Support devices: None.    Cardiac/mediastinum/hilum: Stable.    Lung parenchyma/Pleura: Unchanged bibasilar opacities. No pneumothorax.    Skeleton/soft tissues: Stable.    Impression:      Unchanged bibasilar opacities.    < end of copied text >      PHYSICAL EXAM:  CONSTITUTIONAL: No acute distress, well-developed, well-groomed, AAOx3  HEAD: Atraumatic, normocephalic  EYES: EOM intact, PERRLA, conjunctiva and sclera clear  ENT: Supple, no masses, no thyromegaly, no bruits, no JVD; moist mucous membranes  PULMONARY: Clear to auscultation bilaterally; no wheezes, rales, or rhonchi  CARDIOVASCULAR: Regular rate and rhythm; no murmurs, rubs, or gallops  GASTROINTESTINAL: Soft, non-tender, non-distended; bowel sounds present  MUSCULOSKELETAL: 2+ peripheral pulses; no clubbing, no cyanosis, no edema  NEUROLOGY: non-focal  SKIN: No rashes or lesions; warm and dry    ASSESSMENT & PLAN  Patient is a 69yo female with PMHx of diabetes mellitus, paroxysmal atrial fibrillation, history of lower GI bleed and recently admitted for hip fracture who presented with acute hypoxic respiratory failure secondary to acute on chronic HFrEF s/p intubation. Found to have NSTEMI.    #Acute hypoxic respiratory failure s/p extubation, resolving  - Patient saturating well on 3L O2 via nasal cannula  - Chest X-ray shows unchanged bibasilar opacities  - Repeat chest X-ray in AM  - Continue with cefepime 1g IV Q8H    #NSTEMI  - Cardiology consult appreciated  - ECG today shows no R waves in precordial leads, which signifies LAD lesion  - Continue with atorvastatin 80mg PO QD  - Continue with aspirin 81mg PO QD  - Patient scheduled for cath on 2019  - NPO after midnight    #C. diff enterocolitis, resolving  - Patient endorses only 1-2 bowel movements overnight  - C. diff PCR positive on 2019  - Continue with vancomycin 125mg PO Q6H for 10 days (Day #2)    #Anion gap metabolic acidosis  - Anion gap in AM was 18  - Lactate 1.0  - Beta-hydroxybutyrate 1.8  - Sugars have been well controlled  - Possibly due to loss of bicarbonate due to diarrhea  - Repeat BMP and beta-hydroxybutyrate at 4pm    #Diabetic ketoacidosis, resolved  - Anion gap on 2019 in AM was 21  - Beta hydroxybutyrate was 3.6  - Lactate 1.3  - Glucose elevated to 300s  - Administered 10 units of insulin lispro then 10 units of regular insulin  - Sugars are well controlled  - Continue with insulin glargine 15 units SQ QHS  - Continue with insulin lispro 5 units SQ TID with meals  - Continue with insulin sliding scale  - Monitor finger stick glucose and BMP    #Sinus tachycardia, rule out pulmonary embolism  - Patient has history of hip fracture approximately 6 weeks ago  - Has been bedridden during this admission  - [facility] Duplex: no evidence of DVT bilaterally  - Continue with Lovenox 60mg SQ Q12H    #Acute on chronic HFrEF exacerbation, resolving  - Cardiology consult appreciated  - Continue with furosemide 20mg IV BID  - Continue with Lovenox 60mg SQ Q12H  - Will schedule for cardiac cath after stabilization of BP  - Keep Mg>2 and K>4  - Patient may require Lifevest prior to discharge based on cath results    #Paroxysmal atrial fibrillation with RVR, resolved  - Patient is in sinus tachycardia  - Cardiology consult appreciated  - Continue with amiodarone 200mg PO BID  - Continue with digoxin 0.125mg PO QD  - EP consult appreciated  - QTc today was 405ms  - Daily EKG to evaluate QTc    #Possible urinary tract infection  - Patient has indwelling Castellon catheter  - Urine culture from 2019: no growth to date  - Continue with cefepime 1g IV Q8H  - In order to obtain urine culture, patient would require Castellon replacement. However, seal is broken and patient has difficult anatomy due to uterine prolapse    #Transaminitis, resolving  - Likely secondary to congestive hepatopathy  - No RUQ pain on exam  - LFTs continue to trend down  - Monitor LFTs    #Anxiety  - Increased Xanax from 0.25mg to 0.5mg PO Q12H  - Administered additional Xanax 0.25mg PO x1 this afternoon    #Misc  - DVT Prophylaxis: Lovenox 60mg SQ Q12H  - Diet: Dysphagia 3 diet with thin liquids, DASH/TLC, consistent carbohydrates with evening snack; NPO after midnight for procedure  - GI Prophylaxis: Pantoprazole 40mg PO QD  - Activity: Increase as tolerated  - IV Fluids: Encourage PO intake  - Code Status: DNR/DNI    Dispo: From home

## 2019-09-26 NOTE — GOALS OF CARE CONVERSATION - ADVANCED CARE PLANNING - CONVERSATION DETAILS
Patient states that she does not want to be resuscitated or intubated. Interested in completing a MOLST form today.

## 2019-09-27 DIAGNOSIS — I50.41 ACUTE COMBINED SYSTOLIC (CONGESTIVE) AND DIASTOLIC (CONGESTIVE) HEART FAILURE: ICD-10-CM

## 2019-09-27 DIAGNOSIS — N17.9 ACUTE KIDNEY FAILURE, UNSPECIFIED: ICD-10-CM

## 2019-09-27 DIAGNOSIS — I25.10 ATHEROSCLEROTIC HEART DISEASE OF NATIVE CORONARY ARTERY WITHOUT ANGINA PECTORIS: ICD-10-CM

## 2019-09-27 DIAGNOSIS — D64.89 OTHER SPECIFIED ANEMIAS: ICD-10-CM

## 2019-09-27 LAB
ALBUMIN SERPL ELPH-MCNC: 3.2 G/DL — LOW (ref 3.5–5.2)
ALP SERPL-CCNC: 173 U/L — HIGH (ref 30–115)
ALT FLD-CCNC: 37 U/L — SIGNIFICANT CHANGE UP (ref 0–41)
ANION GAP SERPL CALC-SCNC: 16 MMOL/L — HIGH (ref 7–14)
ANION GAP SERPL CALC-SCNC: 17 MMOL/L — HIGH (ref 7–14)
APTT BLD: 30.6 SEC — SIGNIFICANT CHANGE UP (ref 27–39.2)
AST SERPL-CCNC: 14 U/L — SIGNIFICANT CHANGE UP (ref 0–41)
BILIRUB SERPL-MCNC: 0.3 MG/DL — SIGNIFICANT CHANGE UP (ref 0.2–1.2)
BUN SERPL-MCNC: 58 MG/DL — HIGH (ref 10–20)
BUN SERPL-MCNC: 60 MG/DL — HIGH (ref 10–20)
CALCIUM SERPL-MCNC: 9.6 MG/DL — SIGNIFICANT CHANGE UP (ref 8.5–10.1)
CALCIUM SERPL-MCNC: 9.9 MG/DL — SIGNIFICANT CHANGE UP (ref 8.5–10.1)
CHLORIDE SERPL-SCNC: 102 MMOL/L — SIGNIFICANT CHANGE UP (ref 98–110)
CHLORIDE SERPL-SCNC: 103 MMOL/L — SIGNIFICANT CHANGE UP (ref 98–110)
CO2 SERPL-SCNC: 17 MMOL/L — SIGNIFICANT CHANGE UP (ref 17–32)
CO2 SERPL-SCNC: 19 MMOL/L — SIGNIFICANT CHANGE UP (ref 17–32)
CREAT SERPL-MCNC: 1.2 MG/DL — SIGNIFICANT CHANGE UP (ref 0.7–1.5)
CREAT SERPL-MCNC: 1.2 MG/DL — SIGNIFICANT CHANGE UP (ref 0.7–1.5)
CULTURE RESULTS: SIGNIFICANT CHANGE UP
GLUCOSE BLDC GLUCOMTR-MCNC: 150 MG/DL — HIGH (ref 70–99)
GLUCOSE BLDC GLUCOMTR-MCNC: 168 MG/DL — HIGH (ref 70–99)
GLUCOSE BLDC GLUCOMTR-MCNC: 176 MG/DL — HIGH (ref 70–99)
GLUCOSE BLDC GLUCOMTR-MCNC: 192 MG/DL — HIGH (ref 70–99)
GLUCOSE SERPL-MCNC: 158 MG/DL — HIGH (ref 70–99)
GLUCOSE SERPL-MCNC: 167 MG/DL — HIGH (ref 70–99)
HCT VFR BLD CALC: 31.9 % — LOW (ref 37–47)
HGB BLD-MCNC: 10 G/DL — LOW (ref 12–16)
INR BLD: 1.17 RATIO — SIGNIFICANT CHANGE UP (ref 0.65–1.3)
MAGNESIUM SERPL-MCNC: 2.1 MG/DL — SIGNIFICANT CHANGE UP (ref 1.8–2.4)
MAGNESIUM SERPL-MCNC: 2.1 MG/DL — SIGNIFICANT CHANGE UP (ref 1.8–2.4)
MCHC RBC-ENTMCNC: 26 PG — LOW (ref 27–31)
MCHC RBC-ENTMCNC: 31.3 G/DL — LOW (ref 32–37)
MCV RBC AUTO: 82.9 FL — SIGNIFICANT CHANGE UP (ref 81–99)
NRBC # BLD: 0 /100 WBCS — SIGNIFICANT CHANGE UP (ref 0–0)
PHOSPHATE SERPL-MCNC: 3.6 MG/DL — SIGNIFICANT CHANGE UP (ref 2.1–4.9)
PLATELET # BLD AUTO: 376 K/UL — SIGNIFICANT CHANGE UP (ref 130–400)
POTASSIUM SERPL-MCNC: 4.1 MMOL/L — SIGNIFICANT CHANGE UP (ref 3.5–5)
POTASSIUM SERPL-MCNC: 4.2 MMOL/L — SIGNIFICANT CHANGE UP (ref 3.5–5)
POTASSIUM SERPL-SCNC: 4.1 MMOL/L — SIGNIFICANT CHANGE UP (ref 3.5–5)
POTASSIUM SERPL-SCNC: 4.2 MMOL/L — SIGNIFICANT CHANGE UP (ref 3.5–5)
PROT SERPL-MCNC: 6.4 G/DL — SIGNIFICANT CHANGE UP (ref 6–8)
PROTHROM AB SERPL-ACNC: 13.4 SEC — HIGH (ref 9.95–12.87)
RBC # BLD: 3.85 M/UL — LOW (ref 4.2–5.4)
RBC # FLD: 14.4 % — SIGNIFICANT CHANGE UP (ref 11.5–14.5)
SODIUM SERPL-SCNC: 135 MMOL/L — SIGNIFICANT CHANGE UP (ref 135–146)
SODIUM SERPL-SCNC: 139 MMOL/L — SIGNIFICANT CHANGE UP (ref 135–146)
SPECIMEN SOURCE: SIGNIFICANT CHANGE UP
WBC # BLD: 8.42 K/UL — SIGNIFICANT CHANGE UP (ref 4.8–10.8)
WBC # FLD AUTO: 8.42 K/UL — SIGNIFICANT CHANGE UP (ref 4.8–10.8)

## 2019-09-27 PROCEDURE — ZZZZZ: CPT

## 2019-09-27 PROCEDURE — 99223 1ST HOSP IP/OBS HIGH 75: CPT

## 2019-09-27 PROCEDURE — 99222 1ST HOSP IP/OBS MODERATE 55: CPT

## 2019-09-27 PROCEDURE — 99497 ADVNCD CARE PLAN 30 MIN: CPT | Mod: 25

## 2019-09-27 PROCEDURE — 99233 SBSQ HOSP IP/OBS HIGH 50: CPT

## 2019-09-27 PROCEDURE — 71045 X-RAY EXAM CHEST 1 VIEW: CPT | Mod: 26,77

## 2019-09-27 PROCEDURE — 93010 ELECTROCARDIOGRAM REPORT: CPT

## 2019-09-27 PROCEDURE — 71045 X-RAY EXAM CHEST 1 VIEW: CPT | Mod: 26

## 2019-09-27 RX ORDER — CLOPIDOGREL BISULFATE 75 MG/1
75 TABLET, FILM COATED ORAL DAILY
Refills: 0 | Status: DISCONTINUED | OUTPATIENT
Start: 2019-09-27 | End: 2019-10-01

## 2019-09-27 RX ORDER — FUROSEMIDE 40 MG
40 TABLET ORAL ONCE
Refills: 0 | Status: COMPLETED | OUTPATIENT
Start: 2019-09-27 | End: 2019-09-27

## 2019-09-27 RX ORDER — NITROGLYCERIN 6.5 MG
30 CAPSULE, EXTENDED RELEASE ORAL
Qty: 50 | Refills: 0 | Status: DISCONTINUED | OUTPATIENT
Start: 2019-09-27 | End: 2019-09-29

## 2019-09-27 RX ORDER — FUROSEMIDE 40 MG
8 TABLET ORAL
Qty: 500 | Refills: 0 | Status: DISCONTINUED | OUTPATIENT
Start: 2019-09-27 | End: 2019-09-29

## 2019-09-27 RX ADMIN — Medication 6 MICROGRAM(S)/MIN: at 21:40

## 2019-09-27 RX ADMIN — ENOXAPARIN SODIUM 60 MILLIGRAM(S): 100 INJECTION SUBCUTANEOUS at 18:07

## 2019-09-27 RX ADMIN — Medication 0.5 MILLIGRAM(S): at 18:06

## 2019-09-27 RX ADMIN — Medication 125 MILLIGRAM(S): at 18:08

## 2019-09-27 RX ADMIN — CEFEPIME 100 MILLIGRAM(S): 1 INJECTION, POWDER, FOR SOLUTION INTRAMUSCULAR; INTRAVENOUS at 06:13

## 2019-09-27 RX ADMIN — Medication 81 MILLIGRAM(S): at 11:11

## 2019-09-27 RX ADMIN — Medication 0.12 MILLIGRAM(S): at 21:13

## 2019-09-27 RX ADMIN — Medication 125 MILLIGRAM(S): at 00:40

## 2019-09-27 RX ADMIN — CLOPIDOGREL BISULFATE 75 MILLIGRAM(S): 75 TABLET, FILM COATED ORAL at 15:27

## 2019-09-27 RX ADMIN — Medication 125 MILLIGRAM(S): at 06:13

## 2019-09-27 RX ADMIN — Medication 5 UNIT(S): at 17:28

## 2019-09-27 RX ADMIN — PANTOPRAZOLE SODIUM 40 MILLIGRAM(S): 20 TABLET, DELAYED RELEASE ORAL at 06:13

## 2019-09-27 RX ADMIN — AMIODARONE HYDROCHLORIDE 200 MILLIGRAM(S): 400 TABLET ORAL at 06:13

## 2019-09-27 RX ADMIN — Medication 2.5 MG/HR: at 21:40

## 2019-09-27 RX ADMIN — Medication 2.5 MG/HR: at 16:50

## 2019-09-27 RX ADMIN — AMIODARONE HYDROCHLORIDE 200 MILLIGRAM(S): 400 TABLET ORAL at 18:07

## 2019-09-27 RX ADMIN — CEFEPIME 100 MILLIGRAM(S): 1 INJECTION, POWDER, FOR SOLUTION INTRAMUSCULAR; INTRAVENOUS at 15:24

## 2019-09-27 RX ADMIN — CHLORHEXIDINE GLUCONATE 1 APPLICATION(S): 213 SOLUTION TOPICAL at 06:13

## 2019-09-27 RX ADMIN — INSULIN GLARGINE 15 UNIT(S): 100 INJECTION, SOLUTION SUBCUTANEOUS at 21:22

## 2019-09-27 RX ADMIN — Medication 40 MILLIGRAM(S): at 14:25

## 2019-09-27 RX ADMIN — Medication 3 MICROGRAM(S)/MIN: at 16:30

## 2019-09-27 RX ADMIN — Medication 125 MILLIGRAM(S): at 11:11

## 2019-09-27 RX ADMIN — ATORVASTATIN CALCIUM 80 MILLIGRAM(S): 80 TABLET, FILM COATED ORAL at 21:13

## 2019-09-27 RX ADMIN — Medication 25 MILLIGRAM(S): at 00:05

## 2019-09-27 RX ADMIN — CEFEPIME 100 MILLIGRAM(S): 1 INJECTION, POWDER, FOR SOLUTION INTRAMUSCULAR; INTRAVENOUS at 21:13

## 2019-09-27 RX ADMIN — Medication 1: at 17:28

## 2019-09-27 RX ADMIN — Medication 0.5 MILLIGRAM(S): at 06:13

## 2019-09-27 RX ADMIN — Medication 125 MILLIGRAM(S): at 23:56

## 2019-09-27 NOTE — CONSULT NOTE ADULT - ASSESSMENT
67 y/o F with h/o DM admitted with c/o acute onset of SOB associated with nausea, back pain and weakness. Patient with positive troponin on admission and LVEF of ~ 15%. 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 Patient currently in acute heart failure exacerbation

## 2019-09-27 NOTE — CONSULT NOTE ADULT - PROBLEM SELECTOR RECOMMENDATION 2
Patient has 80% disease of LM and triple vessel disease   Deemed not a good surgical candidate at the moment  Patient being evaluated by intervention cardiology for PCI   Consider IABP support given significant LM disease in the context of acutely decreased LV systolic function  Continue anticoagulation; consider heparin gtt instead of enoxaparin   Continue DAPT with aspirin and clopidogrel   Continue statin therapy

## 2019-09-27 NOTE — CONSULT NOTE ADULT - PROBLEM SELECTOR RECOMMENDATION 9
Patient with Preserved LV systolic function about a month ago, LVEF was ~ 45%. Now presenting with new onset SOB, NSTEMI and LVEF of 15%   Patient is congested on exam  Start furosemide gtt at 5 mg/hr   Monitor electrolytes closely, at least twice daily  Keep Magnesium > 2.2 and K > 4.2   Start nitroglycerine gtt at 10 mcg/hr - increase to 20 mcg after 20 min as long as MAP > 65 mmHg  Avoid betablocker for now    Get Lactic acid  Monitor LFTs and renal function   BIPAP therapy   Discussed with CCU team and CT surgery  Prognosis guarded

## 2019-09-27 NOTE — CHART NOTE - NSCHARTNOTEFT_GEN_A_CORE
Preliminary Cardiac Catheterization Post-Procedure Report:09-27-19 @ 13:27    Procedure Performed:  [x] Left Heart Catheterization  [ ] Right Heart Catheterization  [ ] Percutaneous Coronary Intervention    Primary Physician: Dr. Gavino MD  Assistant(s): Dr. Álvaro MD    Preliminary Procedure Summary (Official full report to follow)    Pre-procedure diagnosis: cardiomyopathy  Post Procedure Diagnosis/Impression:    Left Heart Catheterization:  approximate EF%: 10-15% by 2d echo  [ ] Normal Coronary Arteries  [ ] Luminal Irregularities  [ ] non-obstructive CAD  [x] 3 vessel coronary artery disease       Anesthesia Type  [x] conscious sedation  [x] local/regional anesthesia  [  ] general anesthesia    Estimated Blood Loss  [x ] less than 20 ml    Amount of Contrast used:  30 ml    Access  [x] Rt. Femoral A  [ ] Rt. Femoral V  [ ] Rt. Radial A  [ ] Rt. Brachial V    Condition of patient after procedure  [x] stable  [  ] guarded  [  ] satisfactory     CATH SUMMARY/FINDINGS:    Dominance:   [x] Right  [ ] Left                  LM:    ostial LM: 85% stenosis    LAD:                        prox LAD: minor irregularities  mid LAD: 60% tubular stenosis   distal LAD: minor irregularities    Diag:   D1: meidum sized, minor irregularities  D2: small sized, mild atherosclerosis    Cx:  prox LCX: mild atherosclerosis  mid LCX: 90% stenosis just after OM1  distal LCX: small sized, minor irregularities    OM:  OM1: large sized vessel, 80% stenosis in the proximal third of the vessel    RCA:  prox RCA: minor irregularities  mid RCA: 95% stenosis  distal RCA: mild atherosclerosis    RPDA: mild atherosclerosis      Follow-up Care:  [x] Return to In-patient bed CCU  [x] Return for staged procedure for PCI of LM, and RCA with Impella support  [x] CT Surgery consult called for evaluation for CABG  [x] ASA 81mg, Plavix 75mg, statin, beta-blocker, and ACEi  [x] intensive medical management  Monitor BUN/Cr  repeat EKG in am Preliminary Cardiac Catheterization Post-Procedure Report:09-27-19 @ 13:27    Procedure Performed:  [x] Left Heart Catheterization  [ ] Right Heart Catheterization  [ ] Percutaneous Coronary Intervention    Primary Physician: Dr. Gavino MD  Assistant(s): Dr. Álvaro MD    Preliminary Procedure Summary (Official full report to follow)    Pre-procedure diagnosis: cardiomyopathy, NSTEMI  Post Procedure Diagnosis/Impression: 3 vessel CAD (LM, RCA, LCX, and LAD)    Left Heart Catheterization:  approximate EF%: 10-15% by 2d echo  [ ] Normal Coronary Arteries  [ ] Luminal Irregularities  [ ] non-obstructive CAD  [x] 3 vessel coronary artery disease       Anesthesia Type  [x] conscious sedation  [x] local/regional anesthesia  [  ] general anesthesia    Estimated Blood Loss  [x ] less than 20 ml    Amount of Contrast used:  30 ml    Access  [x] Rt. Femoral A  [ ] Rt. Femoral V  [ ] Rt. Radial A  [ ] Rt. Brachial V    Condition of patient after procedure  [x] stable  [  ] guarded  [  ] satisfactory     CATH SUMMARY/FINDINGS:    Dominance:   [x] Right  [ ] Left                  LM:    ostial LM: 85% stenosis    LAD:                        prox LAD: minor irregularities  mid LAD: 60% tubular stenosis   distal LAD: minor irregularities    Diag:   D1: meidum sized, minor irregularities  D2: small sized, mild atherosclerosis    Cx:  prox LCX: mild atherosclerosis  mid LCX: 90% stenosis just after OM1  distal LCX: small sized, minor irregularities    OM:  OM1: large sized vessel, 80% stenosis in the proximal third of the vessel    RCA:  prox RCA: minor irregularities  mid RCA: 95% stenosis  distal RCA: mild atherosclerosis    RPDA: mild atherosclerosis      Follow-up Care:  [x] Return to In-patient bed CCU  [x] Return for staged procedure for PCI of LM, and RCA with Impella support  [x] CT Surgery consult called for evaluation for CABG  [x] ASA 81mg, Plavix 75mg, statin, beta-blocker, and ACEi  [x] intensive medical management  Monitor BUN/Cr  repeat EKG in am Preliminary Cardiac Catheterization Post-Procedure Report:09-27-19 @ 13:27    Procedure Performed:  [x] Left Heart Catheterization  [ ] Right Heart Catheterization  [ ] Percutaneous Coronary Intervention    Primary Physician: Dr. Gavino MD  Assistant(s): Dr. Álvaro MD    Preliminary Procedure Summary (Official full report to follow)    Pre-procedure diagnosis: cardiomyopathy, NSTEMI  Post Procedure Diagnosis/Impression: 3 vessel CAD (LM, RCA, LCX, and LAD)    Left Heart Catheterization:  approximate EF%: 10-15% by 2d echo  [ ] Normal Coronary Arteries  [ ] Luminal Irregularities  [ ] non-obstructive CAD  [x] 3 vessel coronary artery disease       Anesthesia Type  [x] conscious sedation  [x] local/regional anesthesia  [  ] general anesthesia    Estimated Blood Loss  [x ] less than 20 ml    Amount of Contrast used:  30 ml    Access  [x] Rt. Femoral A (manual compression)  [ ] Rt. Femoral V  [ ] Rt. Radial A  [ ] Rt. Brachial V    Condition of patient after procedure  [x] stable  [  ] guarded  [  ] satisfactory     CATH SUMMARY/FINDINGS:    Dominance:   [x] Right  [ ] Left                  LM:    ostial LM: 85% stenosis    LAD:                        prox LAD: minor irregularities  mid LAD: 60% tubular stenosis   distal LAD: minor irregularities    Diag:   D1: meidum sized, minor irregularities  D2: small sized, mild atherosclerosis    Cx:  prox LCX: mild atherosclerosis  mid LCX: 90% stenosis just after OM1  distal LCX: small sized, minor irregularities    OM:  OM1: large sized vessel, 80% stenosis in the proximal third of the vessel    RCA:  prox RCA: minor irregularities  mid RCA: 95% stenosis  distal RCA: mild atherosclerosis    RPDA: mild atherosclerosis      Follow-up Care:  [x] Return to In-patient bed CCU  [x] Return for staged procedure for PCI of LM, and RCA with Impella support  [x] CT Surgery consult called for evaluation for CABG  [x] ASA 81mg, Plavix 75mg, statin, beta-blocker, and ACEi  [x] intensive medical management  Monitor BUN/Cr  repeat EKG in am Preliminary Cardiac Catheterization Post-Procedure Report:09-27-19 @ 13:27    Procedure Performed:  [x] Left Heart Catheterization  [ ] Right Heart Catheterization  [ ] Percutaneous Coronary Intervention    Primary Physician: Dr. Gavino MD  Assistant(s): Dr. Álvaro MD    Preliminary Procedure Summary (Official full report to follow)    Pre-procedure diagnosis: cardiomyopathy, NSTEMI  Post Procedure Diagnosis/Impression: 3 vessel CAD (LM, RCA, LCX, and LAD)    Left Heart Catheterization:  approximate EF%: 10-15% by 2d echo  [ ] Normal Coronary Arteries  [ ] Luminal Irregularities  [ ] non-obstructive CAD  [x] Laft main and 3 vessel coronary artery disease       Anesthesia Type  [x] conscious sedation  [x] local/regional anesthesia  [  ] general anesthesia    Estimated Blood Loss  [x ] less than 20 ml    Amount of Contrast used:  30 ml    Access  [x] Rt. Femoral A (manual compression)  [ ] Rt. Femoral V  [ ] Rt. Radial A  [ ] Rt. Brachial V    Condition of patient after procedure  [x] stable  [  ] guarded  [  ] satisfactory     CATH SUMMARY/FINDINGS:    Dominance:   [x] Right  [ ] Left                  LM:    ostial LM: 85% stenosis    LAD:                        prox LAD: minor irregularities  mid LAD: 60% tubular stenosis   distal LAD: minor irregularities    Diag:   D1: meidum sized, minor irregularities  D2: small sized, mild atherosclerosis    Cx:  prox LCX: mild atherosclerosis  mid LCX: 90% stenosis just after OM1  distal LCX: small sized, minor irregularities    OM:  OM1: large sized vessel, 80% stenosis in the proximal third of the vessel    RCA:  prox RCA: minor irregularities  mid RCA: 95% stenosis  distal RCA: mild atherosclerosis    RPDA: mild atherosclerosis      Follow-up Care:  [x] Return to In-patient bed CCU  [x] Return for staged procedure for PCI of LM, and RCA with Impella support  [x] CT Surgery consult called for evaluation for CABG  [x] ASA 81mg, Plavix 75mg, statin, beta-blocker, and ACEi  [x] intensive medical management  Monitor BUN/Cr  repeat EKG in am    Case discussed with other interventional cardiology, CT surgery and family.

## 2019-09-27 NOTE — CONSULT NOTE ADULT - PROBLEM SELECTOR RECOMMENDATION 4
Iron profile c/w iron deficiency   Consider giving iv iron sucrose (200 mg iv daily for 5 days or 300 mg daily for 3 days )

## 2019-09-27 NOTE — CONSULT NOTE ADULT - SUBJECTIVE AND OBJECTIVE BOX
Surgeon: Dr. Escobar    Consult requesting by: Dr. Mancia    HISTORY OF PRESENT ILLNESS:  68F with PMHx of uncontrolled DM (non-compliant), viral myopathy, Paroxysmal Afib, recent labial abscess s/p ID, hip fracture s/p fixation, recent LGIB bleed now presents for shortness of breath. Patient has been having shortness of breath for the last day. Occurs on exertion and stationary however no worsening of breathing when laying down. Patient denied abdominal pain however family reports she has been having some abdominal discomfort over the last day. Patient also has an indwelling montejo for the last 3 weeks for urinary retention. Reports cough with no sputum production. Denies chest pain, fever, nausea/vomiting, diarrhea. (21 Sep 2019 21:52)      PAST MEDICAL & SURGICAL HISTORY:  Female bladder prolapse  Diabetes  History of repair of hip fracture      MEDICATIONS  (STANDING):  ALPRAZolam 0.5 milliGRAM(s) Oral two times a day  amiodarone    Tablet 200 milliGRAM(s) Oral two times a day  aspirin  chewable 81 milliGRAM(s) Oral daily  atorvastatin 80 milliGRAM(s) Oral daily  cefepime   IVPB      cefepime   IVPB 1000 milliGRAM(s) IV Intermittent every 8 hours  chlorhexidine 4% Liquid 1 Application(s) Topical <User Schedule>  clopidogrel Tablet 75 milliGRAM(s) Oral daily  dextrose 5%. 1000 milliLiter(s) (50 mL/Hr) IV Continuous <Continuous>  dextrose 50% Injectable 12.5 Gram(s) IV Push once  dextrose 50% Injectable 25 Gram(s) IV Push once  dextrose 50% Injectable 25 Gram(s) IV Push once  digoxin     Tablet 0.125 milliGRAM(s) Oral daily  enoxaparin Injectable 60 milliGRAM(s) SubCutaneous every 12 hours  furosemide Infusion 5 mG/Hr (2.5 mL/Hr) IV Continuous <Continuous>  insulin glargine Injectable (LANTUS) 15 Unit(s) SubCutaneous at bedtime  insulin lispro (HumaLOG) corrective regimen sliding scale   SubCutaneous three times a day before meals  insulin lispro Injectable (HumaLOG) 5 Unit(s) SubCutaneous three times a day before meals  nitroglycerin  Infusion 20 MICROgram(s)/Min (6 mL/Hr) IV Continuous <Continuous>  ondansetron Injectable 4 milliGRAM(s) IV Push once  pantoprazole    Tablet 40 milliGRAM(s) Oral before breakfast  vancomycin    Solution 125 milliGRAM(s) Oral every 6 hours    MEDICATIONS  (PRN):  dextrose 40% Gel 15 Gram(s) Oral once PRN Blood Glucose LESS THAN 70 milliGRAM(s)/deciliter  glucagon  Injectable 1 milliGRAM(s) IntraMuscular once PRN Glucose LESS THAN 70 milligrams/deciliter  lidocaine   Patch 1 Patch Transdermal daily PRN Back pain  morphine  - Injectable 2 milliGRAM(s) IV Push every 6 hours PRN Moderate Pain (4 - 6)      Allergies    No Known Allergies    Intolerances        SOCIAL HISTORY:  Smoker: [ ] Yes  [ ] No        PACK YEARS:                         WHEN QUIT?  ETOH use: [ ] Yes  [ ] No              FREQUENCY / QUANTITY:  Ilicit Drug use:  [ ] Yes  [ ] No  Occupation:  Lives with:  Assisted device use:  5 meter walk test: 1____sec, 2____sec, 3___sec  FAMILY HISTORY:  FH: myocardial infarction  FH: stomach cancer      Review of Systems  CONSTITUTIONAL:  Fevers[ ] chills[ ] sweats[ ] fatigue[ ] weight loss[ ] weight gain [ ]                                     NEGATIVE [X ]   NEURO:  paresthesias[ ] seizures [ ]  syncope [ ]  confusion [ ]                                                                                NEGATIVE[ X]   EYES: glasses[ ]  blurry vision[ ]  discharge[ ] pain[ ] glaucoma [ ]                                                                          NEGATIVE[X ]   ENMT:  difficulty hearing [ ]  vertigo[ ]  dysphagia[ ] epistaxis[ ] recent dental work [ ]                                    NEGATIVE[ X]   CV:  chest pain[ ] palpitations[ ] VOGEL [x ] diaphoresis [ ]                                                                                            RESPIRATORY:  wheezing[ ] SOB[x ] cough [ ] sputum[ ] hemoptysis[ ]                                                                   GI:  nausea[ ]  vomiting [ ]  diarrhea[ ] constipation [ ] melena [ ]                                                                         NEGATIVE[ X]   : hematuria[ ]  dysuria[ ] urgency[ ] incontinence[ ]                                                                                            NEGATIVE[ X]   MUSKULOSKELETAL:  arthritis[ ]  joint swelling [ ] muscle weakness [ ] Hx vein stripping [ ]                             NEGATIVE[X ]   SKIN/BREAST:  rash[ ] itching [ ]  hair loss[ ] masses[ ]                                                                                              NEGATIVE[ X]   PSYCH:  dementia [ ] depression [ ] anxiety[ ]                                                                                                               NEGATIVE[X ]   HEME/LYMPH:  bruises easily[ ] enlarged lymph nodes[ ] tender lymph nodes[ ]                                               NEGATIVE[ X]   ENDOCRINE:  cold intolerance[ ] heat intolerance[ ] polydipsia[ ]                                                                          NEGATIVE[ X]     PHYSICAL EXAM  Vital Signs Last 24 Hrs  T(C): 35.7 (27 Sep 2019 16:00), Max: 36.2 (27 Sep 2019 12:00)  T(F): 96.2 (27 Sep 2019 16:00), Max: 97.2 (27 Sep 2019 12:00)  HR: 96 (27 Sep 2019 18:00) (84 - 106)  BP: 110/69 (27 Sep 2019 18:00) (90/52 - 117/76)  BP(mean): 81 (27 Sep 2019 18:00) (69 - 90)  RR: 32 (27 Sep 2019 18:00) (17 - 44)  SpO2: 96% (27 Sep 2019 18:00) (84% - 100%)      CONSTITUTIONAL:  Patient in apparent respiratory distress  Neuro: WNL [x ] Normal exam oriented to person/place & time with no focal motor or sensory  deficits. Other                     Eyes:    WNL [ x] Normal exam of conjunctiva & lids, pupils equally reactive. Other     ENT:     WNL [x ] Normal exam of nasal/oral mucosa with absence of cyanosis. Other  Neck:   WNL [x ] Normal exam of jugular veins, trachea & thyroid. Other  Chest:  Labored respiration w/bibasilar crackles  CV:  Auscultation: normal [ ] S3[ ] S4[ ] Irregular [ x] Rub[ ] Clicks[ ]    Murmurs none:[ x]systolic [ ]  diastolic [ ] holosystolic [ ]  Carotids: No Bruits[x ] Other                 Abdominal Aorta: normal [ ] nonpalpable[ ]Other                                                                                      GI:           WNL[x ] Normal exam of abdomen, liver & spleen with no noted masses or tenderness. Other                                                                                                        Extremities: WNL[x ] Normal no evidence of cyanosis or deformity Edema: none[ ]trace[ ]1+[ ]2+[ ]3+[ ]4+[ ]  Lower Extremity Pulses: diminished  pedal pulses b/l   SKIN :WNL[x ] Normal exam to inspection & palpation. Other:                                                          LABS:                        10.0   8.42  )-----------( 376      ( 27 Sep 2019 04:28 )             31.9     09-27    135  |  102  |  60<H>  ----------------------------<  167<H>  4.2   |  17  |  1.2    Ca    9.9      27 Sep 2019 04:28  Phos  3.6     09-27  Mg     2.1     09-27    TPro  6.4  /  Alb  3.2<L>  /  TBili  0.3  /  DBili  x   /  AST  14  /  ALT  37  /  AlkPhos  173<H>  09-27    PT/INR - ( 27 Sep 2019 04:28 )   PT: 13.40 sec;   INR: 1.17 ratio         PTT - ( 27 Sep 2019 04:28 )  PTT:30.6 sec    Cardiac Cath: CATH SUMMARY/FINDINGS:    Dominance:   [x] Right  [ ] Left                  LM:    ostial LM: 85% stenosis    LAD:                        prox LAD: minor irregularities  mid LAD: 60% tubular stenosis   distal LAD: minor irregularities    Diag:   D1: meidum sized, minor irregularities  D2: small sized, mild atherosclerosis    Cx:  prox LCX: mild atherosclerosis  mid LCX: 90% stenosis just after OM1  distal LCX: small sized, minor irregularities    OM:  OM1: large sized vessel, 80% stenosis in the proximal third of the vessel    RCA:  prox RCA: minor irregularities  mid RCA: 95% stenosis  distal RCA: mild atherosclerosis    RPDA: mild atherosclerosis      TTE: < from: Transthoracic Echocardiogram (09.22.19 @ 06:53) >  Summary:   1. Left ventricular ejection fraction, by visual estimation, is <20%.   2. LV Ejection Fraction by Renner's Method with a biplane EF of 18 %.   3. Severely decreased global left ventricular systolic function.   4. Spectral Doppler shows pseudonormal pattern of left ventricular   myocardial filling (Grade II diastolic dysfunction).   5. The mitral valve leaflets are tethered due to reduced systolic   function and elevated LVDP.   6. Moderate-to-severe mitral regurgitation.   7. Sclerotic aortic valve with normal opening.   8. The aortic valve mean gradient is 9.9 mmHg consistent with normally   opening aortic valve.   9. Estimated pulmonary artery systolic pressure is 37.5 mmHg assuming a   right atrial pressure of 15 mmHg, which is consistent with borderline   pulmonary hypertension.  10. LA volume Index is 73.0 ml/m² ml/m2.  11. Bilateral pleural effusions.    PHYSICIAN INTERPRETATION:  Left Ventricle: The left ventricular internal cavity size is severely   increased. Left ventricular wall thickness is normal. Global LV systolic   function was severely decreased. Left ventricular ejection fraction, by   visual estimation, is <20%. Spectral Doppler shows pseudonormal pattern   of left ventricular myocardial filling (Grade II diastolic dysfunction).  Right Ventricle: The right ventricular size is normal. RV systolic   function appears reduced.  Left Atrium: Severely enlarged left atrium. LA volume Index is 73.0 ml/m²   ml/m2.  Right Atrium: Normal right atrial size.  Pericardium: There is no evidence of pericardial effusion.  Mitral Valve: The mitral valve leaflets are tethered which is due to   reduced systolic function and elevated LVDP. Moderate to severe mitral   valve regurgitation.  Tricuspid Valve: The tricuspid valve is normal in structure. Mild   tricuspid regurgitation is visualized. Estimated pulmonary artery   systolic pressure is 37.5 mmHg assuming a right atrial pressure of 15   mmHg, which is consistent with borderline pulmonary hypertension.  Aortic Valve: The aortic valve is trileaflet. No evidence of aortic   stenosis. Sclerotic aortic valve with normal opening. The aortic valve   mean gradient is 9.9 mmHg consistent with normally opening aortic valve.   No aortic regurgitation.  Pulmonic Valve: The pulmonic valve is normal. Trace pulmonic valve   regurgitation.  Aorta: Aortic root measured at sinotubular junction is normal.  Venous: The inferior vena cava was dilated, with respiratory size   variation less than 50%, consistent with elevated right atrial pressure.    < end of copied text >      Assessment/ Plan: 68y Female with...  -Cases and plan discussed with CT surgeon Dr. Escobar. Initial STS risk assessed and discussed with patient. Evaluation by full heart team pending. Attending note to follow.

## 2019-09-27 NOTE — CONSULT NOTE ADULT - ATTENDING COMMENTS
68F with PMHx of uncontrolled DM (non-compliant), viral myopathy, female bladder prolapse (3 weeks of indwelling montejo for urinary retention), paroxysmal Afib, recent labial abscess s/p ID, hip fracture s/p fixation, recent LGIB bleed now presents for CHF/cardiogenic shock.  Patient ruled in for severe TVD and was found to have severely diminished EF  CT surgery was asked to evaluate patient for CABG  Patient is severely SOB, sligthly lethargic  severely fluid overloaded on CXR  last 24hrs patient's BP finally improved  rec nitro gtt  rec heart failure consultation  I don't think patient would benefit from surgical revascularization given her poor functional status and severe SOB 2' to severe fluid overload, ARF  case d/w Dr. Mancia and we agreed that patient at the moment would benefit from percutaneous coronary intervention with impella support  cased d/w Dr. Gandara (CHF team)

## 2019-09-27 NOTE — CONSULT NOTE ADULT - SUBJECTIVE AND OBJECTIVE BOX
HPI       67 y/o F with h/o DM, recent hip replacement admitted with c/o sob that started acutely 6 days ago. Per family, patient was lethargic, sob and complaint of nausea. She also felt chest discomfort that radiated to the back. Upon admission, an echocardiogram showed LVEF ~ 15% from ~ 45% one month earlier. His troponin were elevated and pro-BNP was 20 k. A lHC showed triple vessel disease with 80% disease of LM. HPI       69 y/o F with h/o DM, recent hip replacement admitted with c/o sob that started acutely 6 days ago. Per family, patient was lethargic, sob and complaint of nausea. She also felt chest discomfort that radiated to the back. Upon admission, an echocardiogram showed LVEF ~ 15% from ~ 45% one month earlier. His troponin were elevated and pro-BNP was 20 k. A lHC showed triple vessel disease with 80% disease of LM.       PMH: DM, Hip fracture     Social: Denies ETOH, smoking     FH: father  from heart disease in his 50s, mother  in her 30s from cancer

## 2019-09-27 NOTE — PROGRESS NOTE ADULT - ATTENDING COMMENTS
Patient seen and examined independently. I agree with the resident's note, physical exam, and plan except as below.  Vital Signs Last 24 Hrs  T(C): 36.2 (27 Sep 2019 12:00), Max: 36.2 (27 Sep 2019 12:00)  T(F): 97.2 (27 Sep 2019 12:00), Max: 97.2 (27 Sep 2019 12:00)  HR: 94 (27 Sep 2019 14:30) (84 - 106)  BP: 101/68 (27 Sep 2019 14:30) (88/51 - 117/76)  BP(mean): 77 (27 Sep 2019 14:30) (66 - 90)  RR: 17 (27 Sep 2019 14:30) (17 - 54)  SpO2: 92% (27 Sep 2019 14:30) (84% - 100%)  PE  nad  aaox2  on bipap now  bilat air entry , midl basilar crackles  soft ntnd+bs  no cce  +montejo    #ACute hypoxic resp failure sp extubation  now SOB again post cath - placed oin bipap, lasix IV stat  check CXR  currently DNR/I    #NSTEMI sp cath today - severe 3vdz -   LM:    ostial LM: 85% stenosis    LAD:                        prox LAD: minor irregularities  mid LAD: 60% tubular stenosis   distal LAD: minor irregularities    Diag:   D1: meidum sized, minor irregularities  D2: small sized, mild atherosclerosis    Cx:  prox LCX: mild atherosclerosis  mid LCX: 90% stenosis just after OM1  distal LCX: small sized, minor irregularities    OM:  OM1: large sized vessel, 80% stenosis in the proximal third of the vessel    RCA:  prox RCA: minor irregularities  mid RCA: 95% stenosis  distal RCA: mild atherosclerosis    RPDA: mild atherosclerosis    on DAPT - if planned for CABG then hold plavix  CTS currently evaluated - spoke with Dr Bonds  if pt is a candidate for CABG - need to rescind DNr/i for at least 30 days   alternative is stenting by cardio Dr torres if deemed not a candidate for sx    #acute ischemic cardiomyopathy EF 10%    cont diuresis     #Cdiff - on po vanco  no diarrhea today    # DMII   insulin coverage  CAPILLARY BLOOD GLUCOSE      POCT Blood Glucose.: 168 mg/dL (27 Sep 2019 11:17)  POCT Blood Glucose.: 176 mg/dL (27 Sep 2019 08:08)  POCT Blood Glucose.: 140 mg/dL (26 Sep 2019 22:07)  POCT Blood Glucose.: 177 mg/dL (26 Sep 2019 17:01)      # bp and HR improved - off pressors    #afib - prox - rate controlled on amio and digoxin    #chronic montejo     poor overall prognosis  GOC discussed with family 0 daughter at bedside  currently order for DNR/i is in - explained it would need to be rescinded by pt for possibliity of surgery   family wants everything to be done , no restrictions on medical care  discussed with [facility] staff    #Progress Note Handoff  Pending (specify):  Consults_________, Tests________, Test Results_______, Other__cts recs for cabg vs stenting with cardio_______  Family discussion:done as above  Disposition: Home___/SNF___/Other________/Unknown at this time___x_____

## 2019-09-27 NOTE — PROGRESS NOTE ADULT - SUBJECTIVE AND OBJECTIVE BOX
GUS WILBURN 68y Female  MRN#: [id number]   Hospital Day: 6d    SUBJECTIVE  Patient is a 68y old Female who presents with a chief complaint of HFrEF (26 Sep 2019 20:13)  Currently admitted to medicine with the primary diagnosis of Chest pain    INTERVAL HPI AND OVERNIGHT EVENTS:  Patient was examined and seen at bedside. This morning she is resting comfortably in bed and reports no issues or overnight events. Denies any loose bowel movements or abdominal pain overnight.    Scheduled for cardiac cath today with Dr. Mancia    REVIEW OF SYMPTOMS:  CONSTITUTIONAL: No weakness, fevers or chills; No headaches; tired  EYES: No visual changes, eye pain, or discharge  ENT: No vertigo; No ear pain or change in hearing; No sore throat or difficulty swallowing  NECK: No pain or stiffness  RESPIRATORY: No cough, wheezing, or hemoptysis; No shortness of breath  CARDIOVASCULAR: No chest pain or palpitations  GASTROINTESTINAL: No abdominal or epigastric pain; No nausea, vomiting, or hematemesis; No diarrhea or constipation; No melena or hematochezia  GENITOURINARY: No dysuria, frequency or hematuria  MUSCULOSKELETAL: No joint pain, no muscle pain, no weakness  NEUROLOGICAL: No numbness or weakness  SKIN: No itching or rashes      OBJECTIVE  PAST MEDICAL & SURGICAL HISTORY  Female bladder prolapse  Diabetes  History of repair of hip fracture    ALLERGIES:  No Known Allergies    MEDICATIONS:  STANDING MEDICATIONS  ALPRAZolam 0.5 milliGRAM(s) Oral two times a day  amiodarone    Tablet 200 milliGRAM(s) Oral two times a day  aspirin  chewable 81 milliGRAM(s) Oral daily  atorvastatin 80 milliGRAM(s) Oral daily  cefepime   IVPB      cefepime   IVPB 1000 milliGRAM(s) IV Intermittent every 8 hours  chlorhexidine 4% Liquid 1 Application(s) Topical <User Schedule>  dextrose 5%. 1000 milliLiter(s) IV Continuous <Continuous>  dextrose 50% Injectable 12.5 Gram(s) IV Push once  dextrose 50% Injectable 25 Gram(s) IV Push once  dextrose 50% Injectable 25 Gram(s) IV Push once  digoxin     Tablet 0.125 milliGRAM(s) Oral daily  enoxaparin Injectable 60 milliGRAM(s) SubCutaneous every 12 hours  insulin glargine Injectable (LANTUS) 15 Unit(s) SubCutaneous at bedtime  insulin lispro (HumaLOG) corrective regimen sliding scale   SubCutaneous three times a day before meals  insulin lispro Injectable (HumaLOG) 5 Unit(s) SubCutaneous three times a day before meals  ondansetron Injectable 4 milliGRAM(s) IV Push once  pantoprazole    Tablet 40 milliGRAM(s) Oral before breakfast  vancomycin    Solution 125 milliGRAM(s) Oral every 6 hours    PRN MEDICATIONS  dextrose 40% Gel 15 Gram(s) Oral once PRN  glucagon  Injectable 1 milliGRAM(s) IntraMuscular once PRN  lidocaine   Patch 1 Patch Transdermal daily PRN  morphine  - Injectable 2 milliGRAM(s) IV Push every 6 hours PRN      VITAL SIGNS: Last 24 Hours  T(C): 36 (27 Sep 2019 07:40), Max: 36 (27 Sep 2019 07:40)  T(F): 96.8 (27 Sep 2019 07:40), Max: 96.8 (27 Sep 2019 07:40)  HR: 96 (27 Sep 2019 10:00) (88 - 114)  BP: 113/73 (27 Sep 2019 10:00) (88/51 - 117/76)  BP(mean): 88 (27 Sep 2019 10:00) (66 - 88)  RR: 33 (27 Sep 2019 10:00) (21 - 54)  SpO2: 94% (27 Sep 2019 10:00) (86% - 100%)    LABS:                        10.0   8.42  )-----------( 376      ( 27 Sep 2019 04:28 )             31.9     09-27    135  |  102  |  60<H>  ----------------------------<  167<H>  4.2   |  17  |  1.2    Ca    9.9      27 Sep 2019 04:28  Phos  3.6     09-27  Mg     2.1     09-27    TPro  6.4  /  Alb  3.2<L>  /  TBili  0.3  /  DBili  x   /  AST  14  /  ALT  37  /  AlkPhos  173<H>  09-27    PT/INR - ( 27 Sep 2019 04:28 )   PT: 13.40 sec;   INR: 1.17 ratio         PTT - ( 27 Sep 2019 04:28 )  PTT:30.6 sec    ABG - ( 26 Sep 2019 07:20 )  pH, Arterial: 7.46  pH, Blood: x     /  pCO2: 26    /  pO2: 70    / HCO3: 19    / Base Excess: -4.3  /  SaO2: 94                        RADIOLOGY:  < from: Xray Chest 1 View- PORTABLE-Routine (09.26.19 @ 04:49) >  INTERPRETATION:  Clinical History / Reason for exam: Congestive heart   failure.    Comparison : Chest radiograph 9/25/2019.    Technique/Positioning: Single frontal view of the chest.    Findings:    Support devices: None.    Cardiac/mediastinum/hilum: Stable.    Lung parenchyma/Pleura: Unchanged bibasilar opacities. No pneumothorax.    Skeleton/soft tissues: Stable.    Impression:      Unchanged bibasilar opacities.    < end of copied text >      PHYSICAL EXAM:  CONSTITUTIONAL: No acute distress, well-developed, well-groomed, AAOx3; fatigued  HEAD: Atraumatic, normocephalic  EYES: EOM intact, PERRLA, conjunctiva and sclera clear  ENT: Supple, no masses, no thyromegaly, no bruits, no JVD; moist mucous membranes  PULMONARY: Clear to auscultation bilaterally; no wheezes, rales, or rhonchi  CARDIOVASCULAR: Regular rate and rhythm; no murmurs, rubs, or gallops  GASTROINTESTINAL: Soft, non-tender, non-distended; bowel sounds present  MUSCULOSKELETAL: 2+ peripheral pulses; no clubbing, no cyanosis, no edema  NEUROLOGY: non-focal  SKIN: No rashes or lesions; warm and dry    ASSESSMENT & PLAN  Patient is a 67yo female with PMHx of diabetes mellitus, paroxysmal atrial fibrillation, history of lower GI bleed and recently admitted for hip fracture who presented with acute hypoxic respiratory failure secondary to acute on chronic HFrEF s/p intubation. Found to have NSTEMI.    #Acute hypoxic respiratory failure s/p extubation, resolving  - Patient saturating well on 3L O2 via nasal cannula  - Chest X-ray shows unchanged bibasilar opacities  - Repeat chest X-ray in AM  - Continue with cefepime 1g IV Q8H (Day #4)    #NSTEMI  - Cardiology consult appreciated  - ECG today shows no R waves in precordial leads, which signifies LAD lesion  - Continue with atorvastatin 80mg PO QD  - Continue with aspirin 81mg PO QD  - Patient scheduled for cath on 9/27/2019 with Dr. Mancia    #C. diff enterocolitis, resolving  - Patient denies any loose bowel movements or abdominal pain overnight  - C. diff PCR positive on 9/25/2019  - Continue with vancomycin 125mg PO Q6H for 10 days (Day #3)    #Anion gap metabolic acidosis  - Anion gap in AM was 16  - Beta-hydroxybutyrate yesterday in afternoon was 0.4  - Sugars have been well controlled  - Possibly due to loss of bicarbonate due to diarrhea  - Repeat CMP in AM    #Diabetic ketoacidosis, resolved  - Anion gap on 9/25/2019 in AM was 21  - Beta hydroxybutyrate was 3.6  - Lactate 1.3  - Glucose elevated to 300s  - Administered 10 units of insulin lispro then 10 units of regular insulin  - Sugars are well controlled  - Continue with insulin glargine 15 units SQ QHS  - Continue with insulin lispro 5 units SQ TID with meals  - Continue with insulin sliding scale  - Monitor finger stick glucose and BMP    #Borderline sinus tachycardia, rule out pulmonary embolism  - Patient has history of hip fracture approximately 6 weeks ago  - Has been bedridden during this admission  - [facility] Duplex: no evidence of DVT bilaterally  - Continue with Lovenox 60mg SQ Q12H    #Acute on chronic HFrEF exacerbation, resolving  - Cardiology consult appreciated  - Continue with furosemide 20mg IV BID  - Continue with Lovenox 60mg SQ Q12H  - Will go for cardiac cath today with Dr. Mancia  - Keep Mg>2 and K>4  - Patient may require Lifevest prior to discharge based on cath results  - Educated patient and family on cardiac rehab s/p cath    #Paroxysmal atrial fibrillation with RVR, resolved  - Patient is in sinus tachycardia  - Cardiology consult appreciated  - Continue with amiodarone 200mg PO BID  - Continue with digoxin 0.125mg PO QD    #Possible urinary tract infection  - Patient has indwelling Castellon catheter  - Urine culture from 9/22/2019: no growth to date  - Continue with cefepime 1g IV Q8H (Day #4)  - In order to obtain urine culture, patient would require Castellon replacement. However, seal is broken and patient has difficult anatomy due to uterine prolapse    #Transaminitis, resolving  - Likely secondary to congestive hepatopathy  - No RUQ pain on exam  - LFTs continue to trend down  - Monitor LFTs    #Anxiety  - Continue with Xanax 0.5mg PO Q12H    #Misc  - DVT Prophylaxis: Lovenox 60mg SQ Q12H  - Diet: NPO for procedur; will restart Dysphagia 3 diet with thin liquids, DASH/TLC, consistent carbohydrates with evening snack afterwards  - GI Prophylaxis: Pantoprazole 40mg PO QD  - Activity: Increase as tolerated  - IV Fluids: Encourage PO intake  - Code Status: DNR/DNI    Dispo: From home

## 2019-09-28 LAB
ALBUMIN SERPL ELPH-MCNC: 3.2 G/DL — LOW (ref 3.5–5.2)
ALP SERPL-CCNC: 143 U/L — HIGH (ref 30–115)
ALT FLD-CCNC: 27 U/L — SIGNIFICANT CHANGE UP (ref 0–41)
ANION GAP SERPL CALC-SCNC: 14 MMOL/L — SIGNIFICANT CHANGE UP (ref 7–14)
ANION GAP SERPL CALC-SCNC: 16 MMOL/L — HIGH (ref 7–14)
APPEARANCE UR: ABNORMAL
AST SERPL-CCNC: 11 U/L — SIGNIFICANT CHANGE UP (ref 0–41)
BACTERIA # UR AUTO: ABNORMAL
BASOPHILS # BLD AUTO: 0.01 K/UL — SIGNIFICANT CHANGE UP (ref 0–0.2)
BASOPHILS NFR BLD AUTO: 0.1 % — SIGNIFICANT CHANGE UP (ref 0–1)
BILIRUB SERPL-MCNC: 0.2 MG/DL — SIGNIFICANT CHANGE UP (ref 0.2–1.2)
BILIRUB UR-MCNC: NEGATIVE — SIGNIFICANT CHANGE UP
BUN SERPL-MCNC: 52 MG/DL — HIGH (ref 10–20)
BUN SERPL-MCNC: 55 MG/DL — HIGH (ref 10–20)
CALCIUM SERPL-MCNC: 9.5 MG/DL — SIGNIFICANT CHANGE UP (ref 8.5–10.1)
CALCIUM SERPL-MCNC: 9.6 MG/DL — SIGNIFICANT CHANGE UP (ref 8.5–10.1)
CHLORIDE SERPL-SCNC: 103 MMOL/L — SIGNIFICANT CHANGE UP (ref 98–110)
CHLORIDE SERPL-SCNC: 104 MMOL/L — SIGNIFICANT CHANGE UP (ref 98–110)
CO2 SERPL-SCNC: 20 MMOL/L — SIGNIFICANT CHANGE UP (ref 17–32)
CO2 SERPL-SCNC: 21 MMOL/L — SIGNIFICANT CHANGE UP (ref 17–32)
COLOR SPEC: SIGNIFICANT CHANGE UP
CREAT SERPL-MCNC: 1.1 MG/DL — SIGNIFICANT CHANGE UP (ref 0.7–1.5)
CREAT SERPL-MCNC: 1.2 MG/DL — SIGNIFICANT CHANGE UP (ref 0.7–1.5)
DIFF PNL FLD: ABNORMAL
EOSINOPHIL # BLD AUTO: 0.3 K/UL — SIGNIFICANT CHANGE UP (ref 0–0.7)
EOSINOPHIL NFR BLD AUTO: 4.2 % — SIGNIFICANT CHANGE UP (ref 0–8)
EPI CELLS # UR: 15 /HPF — HIGH (ref 0–5)
GLUCOSE BLDC GLUCOMTR-MCNC: 119 MG/DL — HIGH (ref 70–99)
GLUCOSE BLDC GLUCOMTR-MCNC: 121 MG/DL — HIGH (ref 70–99)
GLUCOSE BLDC GLUCOMTR-MCNC: 151 MG/DL — HIGH (ref 70–99)
GLUCOSE BLDC GLUCOMTR-MCNC: 98 MG/DL — SIGNIFICANT CHANGE UP (ref 70–99)
GLUCOSE SERPL-MCNC: 110 MG/DL — HIGH (ref 70–99)
GLUCOSE SERPL-MCNC: 110 MG/DL — HIGH (ref 70–99)
GLUCOSE UR QL: NEGATIVE — SIGNIFICANT CHANGE UP
HCT VFR BLD CALC: 28.7 % — LOW (ref 37–47)
HGB BLD-MCNC: 9.2 G/DL — LOW (ref 12–16)
HYALINE CASTS # UR AUTO: 2 /LPF — SIGNIFICANT CHANGE UP (ref 0–7)
IMM GRANULOCYTES NFR BLD AUTO: 0.4 % — HIGH (ref 0.1–0.3)
KETONES UR-MCNC: NEGATIVE — SIGNIFICANT CHANGE UP
LEUKOCYTE ESTERASE UR-ACNC: ABNORMAL
LYMPHOCYTES # BLD AUTO: 1.07 K/UL — LOW (ref 1.2–3.4)
LYMPHOCYTES # BLD AUTO: 14.9 % — LOW (ref 20.5–51.1)
MAGNESIUM SERPL-MCNC: 2.1 MG/DL — SIGNIFICANT CHANGE UP (ref 1.8–2.4)
MAGNESIUM SERPL-MCNC: 2.2 MG/DL — SIGNIFICANT CHANGE UP (ref 1.8–2.4)
MCHC RBC-ENTMCNC: 26.4 PG — LOW (ref 27–31)
MCHC RBC-ENTMCNC: 32.1 G/DL — SIGNIFICANT CHANGE UP (ref 32–37)
MCV RBC AUTO: 82.5 FL — SIGNIFICANT CHANGE UP (ref 81–99)
MONOCYTES # BLD AUTO: 0.62 K/UL — HIGH (ref 0.1–0.6)
MONOCYTES NFR BLD AUTO: 8.6 % — SIGNIFICANT CHANGE UP (ref 1.7–9.3)
NEUTROPHILS # BLD AUTO: 5.17 K/UL — SIGNIFICANT CHANGE UP (ref 1.4–6.5)
NEUTROPHILS NFR BLD AUTO: 71.8 % — SIGNIFICANT CHANGE UP (ref 42.2–75.2)
NITRITE UR-MCNC: NEGATIVE — SIGNIFICANT CHANGE UP
NRBC # BLD: 0 /100 WBCS — SIGNIFICANT CHANGE UP (ref 0–0)
PH UR: 6 — SIGNIFICANT CHANGE UP (ref 5–8)
PHOSPHATE SERPL-MCNC: 3 MG/DL — SIGNIFICANT CHANGE UP (ref 2.1–4.9)
PLATELET # BLD AUTO: 353 K/UL — SIGNIFICANT CHANGE UP (ref 130–400)
POTASSIUM SERPL-MCNC: 3.7 MMOL/L — SIGNIFICANT CHANGE UP (ref 3.5–5)
POTASSIUM SERPL-MCNC: 4 MMOL/L — SIGNIFICANT CHANGE UP (ref 3.5–5)
POTASSIUM SERPL-SCNC: 3.7 MMOL/L — SIGNIFICANT CHANGE UP (ref 3.5–5)
POTASSIUM SERPL-SCNC: 4 MMOL/L — SIGNIFICANT CHANGE UP (ref 3.5–5)
PROT SERPL-MCNC: 6 G/DL — SIGNIFICANT CHANGE UP (ref 6–8)
PROT UR-MCNC: ABNORMAL
RBC # BLD: 3.48 M/UL — LOW (ref 4.2–5.4)
RBC # FLD: 14.5 % — SIGNIFICANT CHANGE UP (ref 11.5–14.5)
RBC CASTS # UR COMP ASSIST: 2 /HPF — SIGNIFICANT CHANGE UP (ref 0–4)
SODIUM SERPL-SCNC: 137 MMOL/L — SIGNIFICANT CHANGE UP (ref 135–146)
SODIUM SERPL-SCNC: 141 MMOL/L — SIGNIFICANT CHANGE UP (ref 135–146)
SP GR SPEC: 1.01 — SIGNIFICANT CHANGE UP (ref 1.01–1.02)
UROBILINOGEN FLD QL: SIGNIFICANT CHANGE UP
WBC # BLD: 7.2 K/UL — SIGNIFICANT CHANGE UP (ref 4.8–10.8)
WBC # FLD AUTO: 7.2 K/UL — SIGNIFICANT CHANGE UP (ref 4.8–10.8)
WBC UR QL: 8 /HPF — SIGNIFICANT CHANGE UP (ref 0–5)

## 2019-09-28 PROCEDURE — 99233 SBSQ HOSP IP/OBS HIGH 50: CPT

## 2019-09-28 PROCEDURE — 71045 X-RAY EXAM CHEST 1 VIEW: CPT | Mod: 26

## 2019-09-28 PROCEDURE — 93010 ELECTROCARDIOGRAM REPORT: CPT

## 2019-09-28 RX ORDER — LIDOCAINE 4 G/100G
2 CREAM TOPICAL DAILY
Refills: 0 | Status: DISCONTINUED | OUTPATIENT
Start: 2019-09-28 | End: 2019-10-07

## 2019-09-28 RX ORDER — MAGNESIUM SULFATE 500 MG/ML
1 VIAL (ML) INJECTION ONCE
Refills: 0 | Status: COMPLETED | OUTPATIENT
Start: 2019-09-28 | End: 2019-09-28

## 2019-09-28 RX ORDER — POTASSIUM CHLORIDE 20 MEQ
20 PACKET (EA) ORAL ONCE
Refills: 0 | Status: COMPLETED | OUTPATIENT
Start: 2019-09-28 | End: 2019-09-28

## 2019-09-28 RX ORDER — FUROSEMIDE 40 MG
20 TABLET ORAL ONCE
Refills: 0 | Status: COMPLETED | OUTPATIENT
Start: 2019-09-28 | End: 2019-09-28

## 2019-09-28 RX ADMIN — CHLORHEXIDINE GLUCONATE 1 APPLICATION(S): 213 SOLUTION TOPICAL at 05:18

## 2019-09-28 RX ADMIN — Medication 81 MILLIGRAM(S): at 11:12

## 2019-09-28 RX ADMIN — MORPHINE SULFATE 2 MILLIGRAM(S): 50 CAPSULE, EXTENDED RELEASE ORAL at 00:55

## 2019-09-28 RX ADMIN — CLOPIDOGREL BISULFATE 75 MILLIGRAM(S): 75 TABLET, FILM COATED ORAL at 11:12

## 2019-09-28 RX ADMIN — AMIODARONE HYDROCHLORIDE 200 MILLIGRAM(S): 400 TABLET ORAL at 17:21

## 2019-09-28 RX ADMIN — ATORVASTATIN CALCIUM 80 MILLIGRAM(S): 80 TABLET, FILM COATED ORAL at 21:49

## 2019-09-28 RX ADMIN — MORPHINE SULFATE 2 MILLIGRAM(S): 50 CAPSULE, EXTENDED RELEASE ORAL at 00:39

## 2019-09-28 RX ADMIN — Medication 125 MILLIGRAM(S): at 05:17

## 2019-09-28 RX ADMIN — Medication 0.12 MILLIGRAM(S): at 21:49

## 2019-09-28 RX ADMIN — Medication 5 UNIT(S): at 17:20

## 2019-09-28 RX ADMIN — Medication 125 MILLIGRAM(S): at 17:21

## 2019-09-28 RX ADMIN — Medication 5 UNIT(S): at 11:11

## 2019-09-28 RX ADMIN — ENOXAPARIN SODIUM 60 MILLIGRAM(S): 100 INJECTION SUBCUTANEOUS at 05:19

## 2019-09-28 RX ADMIN — Medication 20 MILLIEQUIVALENT(S): at 09:27

## 2019-09-28 RX ADMIN — Medication 50 GRAM(S): at 09:26

## 2019-09-28 RX ADMIN — AMIODARONE HYDROCHLORIDE 200 MILLIGRAM(S): 400 TABLET ORAL at 05:19

## 2019-09-28 RX ADMIN — CEFEPIME 100 MILLIGRAM(S): 1 INJECTION, POWDER, FOR SOLUTION INTRAMUSCULAR; INTRAVENOUS at 05:19

## 2019-09-28 RX ADMIN — PANTOPRAZOLE SODIUM 40 MILLIGRAM(S): 20 TABLET, DELAYED RELEASE ORAL at 06:26

## 2019-09-28 RX ADMIN — Medication 0.5 MILLIGRAM(S): at 05:23

## 2019-09-28 RX ADMIN — Medication 5 UNIT(S): at 08:03

## 2019-09-28 RX ADMIN — Medication 9 MICROGRAM(S)/MIN: at 19:32

## 2019-09-28 RX ADMIN — Medication 20 MILLIGRAM(S): at 19:27

## 2019-09-28 RX ADMIN — Medication 125 MILLIGRAM(S): at 11:12

## 2019-09-28 RX ADMIN — Medication 4 MG/HR: at 19:31

## 2019-09-28 RX ADMIN — Medication 0.5 MILLIGRAM(S): at 17:36

## 2019-09-28 RX ADMIN — CEFEPIME 100 MILLIGRAM(S): 1 INJECTION, POWDER, FOR SOLUTION INTRAMUSCULAR; INTRAVENOUS at 14:39

## 2019-09-28 RX ADMIN — INSULIN GLARGINE 15 UNIT(S): 100 INJECTION, SOLUTION SUBCUTANEOUS at 21:57

## 2019-09-28 RX ADMIN — ENOXAPARIN SODIUM 60 MILLIGRAM(S): 100 INJECTION SUBCUTANEOUS at 17:21

## 2019-09-28 RX ADMIN — CEFEPIME 100 MILLIGRAM(S): 1 INJECTION, POWDER, FOR SOLUTION INTRAMUSCULAR; INTRAVENOUS at 21:49

## 2019-09-28 NOTE — PROGRESS NOTE ADULT - SUBJECTIVE AND OBJECTIVE BOX
GUS WILBURN  68y Female    CHIEF COMPLAINT:    Patient is a 68y old  Female who presents with a chief complaint of SOB (27 Sep 2019 15:13)      INTERVAL HPI/OVERNIGHT EVENTS:    Patient seen and examined. No acute events overnight. Fatigued and pale    ROS: All other systems are negative.    Vital Signs:    T(F): 96 (19 @ 07:00), Max: 96.7 (19 @ 00:00)  HR: 80 (19 @ 12:00) (72 - 106)  BP: 100/57 (19 @ 12:00) (85/55 - 116/77)  RR: 19 (19 @ 12:00) (16 - 44)  SpO2: 98% (19 @ 12:00) (84% - 100%)  27 Sep 2019 07:  -  28 Sep 2019 07:00  --------------------------------------------------------  IN: 383 mL / OUT: 1765 mL / NET: -1382 mL    28 Sep 2019 07:  -  28 Sep 2019 12:46  --------------------------------------------------------  IN: 211 mL / OUT: 650 mL / NET: -439 mL    Daily Weight in k.8 (28 Sep 2019 04:00)    POCT Blood Glucose.: 121 mg/dL (28 Sep 2019 11:04)  POCT Blood Glucose.: 119 mg/dL (28 Sep 2019 07:28)  POCT Blood Glucose.: 150 mg/dL (27 Sep 2019 21:19)  POCT Blood Glucose.: 192 mg/dL (27 Sep 2019 17:24)      PHYSICAL EXAM:    GENERAL:  NAD, pale  SKIN: No rashes or lesions  HEENT: Atraumatic. Normocephalic.   NECK: Supple, No JVD. No lymphadenopathy.  PULMONARY: Poor inspiratory effort. No wheezing. No rales  CVS: Normal S1, S2. Rate and Rhythm are regular.    ABDOMEN/GI: Soft, Nontender, Nondistended  MSK:  No edema B/L LE. No clubbing or cyanosis  NEUROLOGIC:  No motor or sensory deficit.  PSYCH: Alert & oriented x 3, normal affect    Consultant(s) Notes Reviewed:  [x ] YES  [ ] NO  Care Discussed with Consultants/Other Providers [ x] YES  [ ] NO    LABS:                        9.2    7.20  )-----------( 353      ( 28 Sep 2019 04:55 )             28.7         141  |  104  |  55<H>  ----------------------------<  110<H>  4.0   |  21  |  1.1    Ca    9.6      28 Sep 2019 04:55  Phos  3.0       Mg     2.1         TPro  6.0  /  Alb  3.2<L>  /  TBili  0.2  /  DBili  x   /  AST  11  /  ALT  27  /  AlkPhos  143<H>      PT/INR - ( 27 Sep 2019 04:28 )   PT: 13.40 sec;   INR: 1.17 ratio         PTT - ( 27 Sep 2019 04:28 )  PTT:30.6 sec  Serum Pro-Brain Natriuretic Peptide: 28689 pg/mL (19 @ 16:41)  RADIOLOGY & ADDITIONAL TESTS:  Imaging or report Personally Reviewed:  [x] YES  [ ] NO  EKG reviewed: [x] YES  [ ] NO    Medications:  Standing  ALPRAZolam 0.5 milliGRAM(s) Oral two times a day  amiodarone    Tablet 200 milliGRAM(s) Oral two times a day  aspirin  chewable 81 milliGRAM(s) Oral daily  atorvastatin 80 milliGRAM(s) Oral daily  cefepime   IVPB      cefepime   IVPB 1000 milliGRAM(s) IV Intermittent every 8 hours  chlorhexidine 4% Liquid 1 Application(s) Topical <User Schedule>  clopidogrel Tablet 75 milliGRAM(s) Oral daily  dextrose 5%. 1000 milliLiter(s) IV Continuous <Continuous>  dextrose 50% Injectable 12.5 Gram(s) IV Push once  dextrose 50% Injectable 25 Gram(s) IV Push once  dextrose 50% Injectable 25 Gram(s) IV Push once  digoxin     Tablet 0.125 milliGRAM(s) Oral daily  enoxaparin Injectable 60 milliGRAM(s) SubCutaneous every 12 hours  furosemide Infusion 5 mG/Hr IV Continuous <Continuous>  insulin glargine Injectable (LANTUS) 15 Unit(s) SubCutaneous at bedtime  insulin lispro (HumaLOG) corrective regimen sliding scale   SubCutaneous three times a day before meals  insulin lispro Injectable (HumaLOG) 5 Unit(s) SubCutaneous three times a day before meals  nitroglycerin  Infusion 20 MICROgram(s)/Min IV Continuous <Continuous>  ondansetron Injectable 4 milliGRAM(s) IV Push once  pantoprazole    Tablet 40 milliGRAM(s) Oral before breakfast  vancomycin    Solution 125 milliGRAM(s) Oral every 6 hours    PRN Meds  dextrose 40% Gel 15 Gram(s) Oral once PRN  glucagon  Injectable 1 milliGRAM(s) IntraMuscular once PRN  lidocaine   Patch 1 Patch Transdermal daily PRN  morphine  - Injectable 2 milliGRAM(s) IV Push every 6 hours PRN      Mendy Shi MD  s. [id number]

## 2019-09-28 NOTE — PROGRESS NOTE ADULT - SUBJECTIVE AND OBJECTIVE BOX
GUS WILBURN 68y Female  MRN#: [id number]   Hospital Day: 7d    SUBJECTIVE  Patient is a 68y old Female who presents with a chief complaint of SOB (28 Sep 2019 12:30)  Currently admitted to medicine with the primary diagnosis of Chest pain    INTERVAL HPI AND OVERNIGHT EVENTS:  Patient was examined and seen at bedside. This morning she is tachynpneic in bed and reports no issues or overnight events.    Patient taken to cardiac cath lab on 2019 by Dr. 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 After patient returned from cath lab, she began complaining of shortness of breath and began desaturating. Started on BiPAP. CT surgery and heart failure teams consulted regarding patient prognosis and care. Patient not candidate for CABG at this time. Possible need for intra-aortic balloon pump. Patient started on furosemide gtt to aid diuresis and nitroyglycerin gtt to reduce systemic vascular resistance to increase cardiac output in setting of heart failure.    REVIEW OF SYMPTOMS:  CONSTITUTIONAL: No weakness, fevers or chills; No headaches  EYES: No visual changes, eye pain, or discharge  ENT: No vertigo; No ear pain or change in hearing; No sore throat or difficulty swallowing  NECK: No pain or stiffness  RESPIRATORY: No cough, wheezing, or hemoptysis; (+) shortness of breath  CARDIOVASCULAR: No chest pain or palpitations  GASTROINTESTINAL: No abdominal or epigastric pain; No nausea, vomiting, or hematemesis; No diarrhea or constipation; No melena or hematochezia  GENITOURINARY: No dysuria, frequency or hematuria  MUSCULOSKELETAL: (+) back pain, no weakness  NEUROLOGICAL: No numbness or weakness  SKIN: No itching or rashes      OBJECTIVE  PAST MEDICAL & SURGICAL HISTORY  Female bladder prolapse  Diabetes  History of repair of hip fracture    ALLERGIES:  No Known Allergies    MEDICATIONS:  STANDING MEDICATIONS  ALPRAZolam 0.5 milliGRAM(s) Oral two times a day  amiodarone    Tablet 200 milliGRAM(s) Oral two times a day  aspirin  chewable 81 milliGRAM(s) Oral daily  atorvastatin 80 milliGRAM(s) Oral daily  cefepime   IVPB      cefepime   IVPB 1000 milliGRAM(s) IV Intermittent every 8 hours  chlorhexidine 4% Liquid 1 Application(s) Topical <User Schedule>  clopidogrel Tablet 75 milliGRAM(s) Oral daily  dextrose 5%. 1000 milliLiter(s) IV Continuous <Continuous>  dextrose 50% Injectable 12.5 Gram(s) IV Push once  dextrose 50% Injectable 25 Gram(s) IV Push once  dextrose 50% Injectable 25 Gram(s) IV Push once  digoxin     Tablet 0.125 milliGRAM(s) Oral daily  enoxaparin Injectable 60 milliGRAM(s) SubCutaneous every 12 hours  furosemide Infusion 8 mG/Hr IV Continuous <Continuous>  insulin glargine Injectable (LANTUS) 15 Unit(s) SubCutaneous at bedtime  insulin lispro (HumaLOG) corrective regimen sliding scale   SubCutaneous three times a day before meals  insulin lispro Injectable (HumaLOG) 5 Unit(s) SubCutaneous three times a day before meals  nitroglycerin  Infusion 30 MICROgram(s)/Min IV Continuous <Continuous>  ondansetron Injectable 4 milliGRAM(s) IV Push once  pantoprazole    Tablet 40 milliGRAM(s) Oral before breakfast  vancomycin    Solution 125 milliGRAM(s) Oral every 6 hours    PRN MEDICATIONS  dextrose 40% Gel 15 Gram(s) Oral once PRN  glucagon  Injectable 1 milliGRAM(s) IntraMuscular once PRN  lidocaine   Patch 1 Patch Transdermal daily PRN  morphine  - Injectable 2 milliGRAM(s) IV Push every 6 hours PRN      VITAL SIGNS: Last 24 Hours  T(C): 35.6 (28 Sep 2019 07:00), Max: 35.9 (28 Sep 2019 00:00)  T(F): 96 (28 Sep 2019 07:00), Max: 96.7 (28 Sep 2019 00:00)  HR: 92 (28 Sep 2019 20:00) (72 - 100)  BP: 120/65 (28 Sep 2019 20:00) (85/55 - 120/65)  BP(mean): 80 (28 Sep 2019 20:00) (64 - 97)  RR: 35 (28 Sep 2019 20:00) (16 - 35)  SpO2: 99% (28 Sep 2019 20:00) (95% - 100%)    LABS:                        9.2    7.20  )-----------( 353      ( 28 Sep 2019 04:55 )             28.7         137  |  103  |  52<H>  ----------------------------<  110<H>  3.7   |  20  |  1.2    Ca    9.5      28 Sep 2019 16:49  Phos  3.0       Mg     2.2         TPro  6.0  /  Alb  3.2<L>  /  TBili  0.2  /  DBili  x   /  AST  11  /  ALT  27  /  AlkPhos  143<H>      PT/INR - ( 27 Sep 2019 04:28 )   PT: 13.40 sec;   INR: 1.17 ratio         PTT - ( 27 Sep 2019 04:28 )  PTT:30.6 sec  Urinalysis Basic - ( 28 Sep 2019 10:39 )    Color: Light Yellow / Appearance: Slightly Turbid / S.014 / pH: x  Gluc: x / Ketone: Negative  / Bili: Negative / Urobili: <2 mg/dL   Blood: x / Protein: 30 mg/dL / Nitrite: Negative   Leuk Esterase: Large / RBC: 2 /HPF / WBC 8 /HPF   Sq Epi: x / Non Sq Epi: 15 /HPF / Bacteria: Few                RADIOLOGY:  < from: Xray Chest 1 View- PORTABLE-Routine (19 @ 04:27) >  INTERPRETATION:  Clinical History / Reason for exam: Dyspnea    Comparison : Chest radiograph 2019.    Technique/Positioning: Frontal.    Findings:    Support devices: None.    Cardiac/mediastinum/hilum: Cardiomegaly no change    Lung parenchyma/Pleura: Pulmonary vascular congestion and bilateral   pleural effusions unchanged. No air leak.    Skeleton/soft tissues: Unremarkable.    Impression:      Pulmonary vascular congestion and bilateral pleural effusions unchanged.   No air leak.    < end of copied text >      PHYSICAL EXAM:  CONSTITUTIONAL: No acute distress, chronically ill-appearing, pale, well-groomed, AAOx3  HEAD: Atraumatic, normocephalic  EYES: EOM intact, PERRLA, conjunctiva and sclera clear  ENT: Supple, no masses, no thyromegaly, no bruits, (+) JVD; moist mucous membranes  PULMONARY: (+) crackles in bilateral lung fields  CARDIOVASCULAR: Regular rate and rhythm; no murmurs, rubs, or gallops; (+) JVD  GASTROINTESTINAL: Soft, non-tender, non-distended; bowel sounds present  MUSCULOSKELETAL: 2+ peripheral pulses; no clubbing, no cyanosis, no edema  NEUROLOGY: non-focal  SKIN: No rashes or lesions; warm and dry    ASSESSMENT & PLAN  Patient is a 69yo female with PMHx of diabetes mellitus, paroxysmal atrial fibrillation, history of lower GI bleed and recently admitted for hip fracture who presented with acute hypoxic respiratory failure secondary to acute on chronic HFrEF s/p intubation. Found to have NSTEMI.    #Acute hypoxic respiratory failure s/p extubation  - Patient saturating well on 3L nasal cannula with BiPAP at night  - Chest X-ray shows unchanged pulmonary vascular congestion  - Repeat chest X-ray in AM  - Continue with cefepime 1g IV Q8H (Day #5)    #NSTEMI s/p cath 2019  - Cardiology consult appreciated  - CT Surgery consult appreciated  - Heart failure consult appreciated  - Continue with atorvastatin 80mg PO QD  - Continue with aspirin 81mg PO QD  - Increase furosemide gtt from 5 to 8mg/hr  - Increase nitroglycerin gtt from 20 to 30 mcg/min  - Will titrate nitroglycerin gtt from 30 to 40 mcg/min overnight  - Consider switching nitroglycerin gtt to captopril tomorrow  - Monitor inputs and outputs, renal function, and body weights  - Keep Mg>2.2 and K>4.4    #C. diff enterocolitis, resolving  - Patient denies any loose bowel movements or abdominal pain overnight  - C. diff PCR positive on 2019  - Continue with vancomycin 125mg PO Q6H for 10 days (Day #4)    #Anion gap metabolic acidosis, resolving  - Anion gap in AM was 16 and lowered to 14 in PM  - Sugars have been well controlled  - Possibly due to loss of bicarbonate due to diarrhea  - Repeat CMP in AM    #Diabetic ketoacidosis, resolved  - Anion gap on 2019 in AM was 21  - Beta hydroxybutyrate was 3.6  - Lactate 1.3  - Glucose elevated to 300s  - Administered 10 units of insulin lispro then 10 units of regular insulin  - Sugars are well controlled  - Continue with insulin glargine 15 units SQ QHS  - Continue with insulin lispro 5 units SQ TID with meals  - Continue with insulin sliding scale  - Monitor finger stick glucose and BMP    #Borderline sinus tachycardia, rule out pulmonary embolism  - Patient has history of hip fracture approximately 6 weeks ago  - Has been bedridden during this admission  - [facility]: no evidence of DVT bilaterally  - Continue with Lovenox 60mg SQ Q12H    #Acute on chronic HFrEF exacerbation, resolving  - Cardiology consult appreciated  - Heart failure consult appreciated  - Continue with atorvastatin 80mg PO QD  - Continue with aspirin 81mg PO QD  - Increase furosemide gtt from 5 to 8mg/hr  - Increase nitroglycerin gtt from 20 to 30 mcg/min  - Will titrate nitroglycerin gtt from 30 to 40 mcg/min overnight  - Consider switching nitroglycerin gtt to captopril tomorrow  - Monitor inputs and outputs, renal function, and body weights  - Keep Mg>2.2 and K>4.4  - Patient may require Lifevest prior to discharge based on cath results  - Educated patient and family on cardiac rehab s/p cath    #Paroxysmal atrial fibrillation with RVR, resolved  - Patient is in sinus tachycardia  - Cardiology consult appreciated  - Continue with amiodarone 200mg PO BID  - Continue with digoxin 0.125mg PO QD    #Possible urinary tract infection  - Patient has indwelling Castellon catheter  - Urine culture from 2019: no growth to date  - Continue with cefepime 1g IV Q8H (Day #5)  - Urogynecology consult appreciated  - Castellon catheter replaced today  - Follow urine culture from 2019    #Transaminitis, resolving  - Likely secondary to congestive hepatopathy  - No RUQ pain on exam  - LFTs continue to trend down  - Monitor LFTs    #Anxiety  - Continue with Xanax 0.5mg PO Q12H    #Misc  - DVT Prophylaxis: Lovenox 60mg SQ Q12H  - Diet: NPO for procedur; will restart Dysphagia 3 diet with thin liquids, DASH/TLC, consistent carbohydrates with evening snack afterwards  - GI Prophylaxis: Pantoprazole 40mg PO QD  - Activity: Increase as tolerated  - IV Fluids: Encourage PO intake  - Code Status: DNR/DNI    Dispo: From home GUS WILBURN 68y Female  MRN#: [id number]   Hospital Day: 7d    SUBJECTIVE  Patient is a 68y old Female who presents with a chief complaint of SOB (28 Sep 2019 12:30)  Currently admitted to medicine with the primary diagnosis of Chest pain    INTERVAL HPI AND OVERNIGHT EVENTS:  Patient was examined and seen at bedside. This morning she is tachynpneic in bed and reports no issues or overnight events.    Patient taken to cardiac cath lab on 2019 by Dr. 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 After patient returned from cath lab, she began complaining of shortness of breath and began desaturating. Started on BiPAP. CT surgery and heart failure teams consulted regarding patient prognosis and care. Patient not candidate for CABG at this time. Possible need for intra-aortic balloon pump. Patient started on furosemide gtt to aid diuresis and nitroyglycerin gtt to reduce systemic vascular resistance to increase cardiac output in setting of heart failure.    REVIEW OF SYMPTOMS:  CONSTITUTIONAL: No weakness, fevers or chills; No headaches  EYES: No visual changes, eye pain, or discharge  ENT: No vertigo; No ear pain or change in hearing; No sore throat or difficulty swallowing  NECK: No pain or stiffness  RESPIRATORY: No cough, wheezing, or hemoptysis; (+) shortness of breath  CARDIOVASCULAR: No chest pain or palpitations  GASTROINTESTINAL: No abdominal or epigastric pain; No nausea, vomiting, or hematemesis; No diarrhea or constipation; No melena or hematochezia  GENITOURINARY: No dysuria, frequency or hematuria  MUSCULOSKELETAL: (+) back pain, no weakness  NEUROLOGICAL: No numbness or weakness  SKIN: No itching or rashes      OBJECTIVE  PAST MEDICAL & SURGICAL HISTORY  Female bladder prolapse  Diabetes  History of repair of hip fracture    ALLERGIES:  No Known Allergies    MEDICATIONS:  STANDING MEDICATIONS  ALPRAZolam 0.5 milliGRAM(s) Oral two times a day  amiodarone    Tablet 200 milliGRAM(s) Oral two times a day  aspirin  chewable 81 milliGRAM(s) Oral daily  atorvastatin 80 milliGRAM(s) Oral daily  cefepime   IVPB      cefepime   IVPB 1000 milliGRAM(s) IV Intermittent every 8 hours  chlorhexidine 4% Liquid 1 Application(s) Topical <User Schedule>  clopidogrel Tablet 75 milliGRAM(s) Oral daily  dextrose 5%. 1000 milliLiter(s) IV Continuous <Continuous>  dextrose 50% Injectable 12.5 Gram(s) IV Push once  dextrose 50% Injectable 25 Gram(s) IV Push once  dextrose 50% Injectable 25 Gram(s) IV Push once  digoxin     Tablet 0.125 milliGRAM(s) Oral daily  enoxaparin Injectable 60 milliGRAM(s) SubCutaneous every 12 hours  furosemide Infusion 8 mG/Hr IV Continuous <Continuous>  insulin glargine Injectable (LANTUS) 15 Unit(s) SubCutaneous at bedtime  insulin lispro (HumaLOG) corrective regimen sliding scale   SubCutaneous three times a day before meals  insulin lispro Injectable (HumaLOG) 5 Unit(s) SubCutaneous three times a day before meals  nitroglycerin  Infusion 30 MICROgram(s)/Min IV Continuous <Continuous>  ondansetron Injectable 4 milliGRAM(s) IV Push once  pantoprazole    Tablet 40 milliGRAM(s) Oral before breakfast  vancomycin    Solution 125 milliGRAM(s) Oral every 6 hours    PRN MEDICATIONS  dextrose 40% Gel 15 Gram(s) Oral once PRN  glucagon  Injectable 1 milliGRAM(s) IntraMuscular once PRN  lidocaine   Patch 1 Patch Transdermal daily PRN  morphine  - Injectable 2 milliGRAM(s) IV Push every 6 hours PRN      VITAL SIGNS: Last 24 Hours  T(C): 35.6 (28 Sep 2019 07:00), Max: 35.9 (28 Sep 2019 00:00)  T(F): 96 (28 Sep 2019 07:00), Max: 96.7 (28 Sep 2019 00:00)  HR: 92 (28 Sep 2019 20:00) (72 - 100)  BP: 120/65 (28 Sep 2019 20:00) (85/55 - 120/65)  BP(mean): 80 (28 Sep 2019 20:00) (64 - 97)  RR: 35 (28 Sep 2019 20:00) (16 - 35)  SpO2: 99% (28 Sep 2019 20:00) (95% - 100%)    LABS:                        9.2    7.20  )-----------( 353      ( 28 Sep 2019 04:55 )             28.7         137  |  103  |  52<H>  ----------------------------<  110<H>  3.7   |  20  |  1.2    Ca    9.5      28 Sep 2019 16:49  Phos  3.0       Mg     2.2         TPro  6.0  /  Alb  3.2<L>  /  TBili  0.2  /  DBili  x   /  AST  11  /  ALT  27  /  AlkPhos  143<H>      PT/INR - ( 27 Sep 2019 04:28 )   PT: 13.40 sec;   INR: 1.17 ratio         PTT - ( 27 Sep 2019 04:28 )  PTT:30.6 sec  Urinalysis Basic - ( 28 Sep 2019 10:39 )    Color: Light Yellow / Appearance: Slightly Turbid / S.014 / pH: x  Gluc: x / Ketone: Negative  / Bili: Negative / Urobili: <2 mg/dL   Blood: x / Protein: 30 mg/dL / Nitrite: Negative   Leuk Esterase: Large / RBC: 2 /HPF / WBC 8 /HPF   Sq Epi: x / Non Sq Epi: 15 /HPF / Bacteria: Few                RADIOLOGY:  < from: Xray Chest 1 View- PORTABLE-Routine (19 @ 04:27) >  INTERPRETATION:  Clinical History / Reason for exam: Dyspnea    Comparison : Chest radiograph 2019.    Technique/Positioning: Frontal.    Findings:    Support devices: None.    Cardiac/mediastinum/hilum: Cardiomegaly no change    Lung parenchyma/Pleura: Pulmonary vascular congestion and bilateral   pleural effusions unchanged. No air leak.    Skeleton/soft tissues: Unremarkable.    Impression:      Pulmonary vascular congestion and bilateral pleural effusions unchanged.   No air leak.    < end of copied text >      PHYSICAL EXAM:  CONSTITUTIONAL: No acute distress, chronically ill-appearing, pale, well-groomed, AAOx3  HEAD: Atraumatic, normocephalic  EYES: EOM intact, PERRLA, conjunctiva and sclera clear  ENT: Supple, no masses, no thyromegaly, no bruits, (+) JVD; moist mucous membranes  PULMONARY: (+) crackles in bilateral lung fields  CARDIOVASCULAR: Regular rate and rhythm; no murmurs, rubs, or gallops; (+) JVD  GASTROINTESTINAL: Soft, non-tender, non-distended; bowel sounds present  MUSCULOSKELETAL: 2+ peripheral pulses; no clubbing, no cyanosis, no edema  NEUROLOGY: non-focal  SKIN: No rashes or lesions; warm and dry    ASSESSMENT & PLAN  Patient is a 69yo female with PMHx of diabetes mellitus, paroxysmal atrial fibrillation, history of lower GI bleed and recently admitted for hip fracture who presented with acute hypoxic respiratory failure secondary to acute on chronic HFrEF s/p intubation. Found to have NSTEMI.    #Acute hypoxic respiratory failure s/p extubation  - Patient saturating well on 3L nasal cannula with BiPAP at night  - Chest X-ray shows unchanged pulmonary vascular congestion  - Repeat chest X-ray in AM  - Continue with cefepime 1g IV Q8H (Day #5)    #NSTEMI s/p cath 2019  - Cardiology consult appreciated  - CT Surgery consult appreciated  - Heart failure consult appreciated  - Continue with atorvastatin 80mg PO QD  - Continue with aspirin 81mg PO QD  - Increase furosemide gtt from 5 to 8mg/hr  - Increase nitroglycerin gtt from 20 to 30 mcg/min  - Will titrate nitroglycerin gtt from 30 to 40 mcg/min overnight  - Consider switching nitroglycerin gtt to captopril tomorrow, start spironolactone 25 mg  - Monitor inputs and outputs, renal function, and body weights  - Keep Mg>2.2 and K>4.4    #C. diff enterocolitis, resolving  - Patient denies any loose bowel movements or abdominal pain overnight  - C. diff PCR positive on 2019  - Continue with vancomycin 125mg PO Q6H for 10 days (Day #4)    #Anion gap metabolic acidosis, resolving  - Anion gap in AM was 16 and lowered to 14 in PM  - Sugars have been well controlled  - Possibly due to loss of bicarbonate due to diarrhea  - Repeat CMP in AM    #Diabetic ketoacidosis, resolved  - Anion gap on 2019 in AM was 21  - Beta hydroxybutyrate was 3.6  - Lactate 1.3  - Glucose elevated to 300s  - Administered 10 units of insulin lispro then 10 units of regular insulin  - Sugars are well controlled  - Continue with insulin glargine 15 units SQ QHS  - Continue with insulin lispro 5 units SQ TID with meals  - Continue with insulin sliding scale  - Monitor finger stick glucose and BMP    #Borderline sinus tachycardia, rule out pulmonary embolism  - Patient has history of hip fracture approximately 6 weeks ago  - Has been bedridden during this admission  - [facility]: no evidence of DVT bilaterally  - Continue with Lovenox 60mg SQ Q12H    #Acute on chronic HFrEF exacerbation, resolving  - Cardiology consult appreciated  - Heart failure consult appreciated  - Continue with atorvastatin 80mg PO QD  - Continue with aspirin 81mg PO QD  - Increase furosemide gtt from 5 to 8mg/hr  - Increase nitroglycerin gtt from 20 to 30 mcg/min  - Will titrate nitroglycerin gtt from 30 to 40 mcg/min overnight  - Consider switching nitroglycerin gtt to captopril tomorrow  - Monitor inputs and outputs, renal function, and body weights  - Keep Mg>2.2 and K>4.4  - Patient may require Lifevest prior to discharge based on cath results  - Educated patient and family on cardiac rehab s/p cath    #Paroxysmal atrial fibrillation with RVR, resolved  - Patient is in sinus tachycardia  - Cardiology consult appreciated  - Continue with amiodarone 200mg PO BID  - Continue with digoxin 0.125mg PO QD    #Possible urinary tract infection  - Patient has indwelling Castellon catheter  - Urine culture from 2019: no growth to date  - Continue with cefepime 1g IV Q8H (Day #5)  - Urogynecology consult appreciated  - Castellon catheter replaced today  - Follow urine culture from 2019    #Transaminitis, resolving  - Likely secondary to congestive hepatopathy  - No RUQ pain on exam  - LFTs continue to trend down  - Monitor LFTs    #Anxiety  - Continue with Xanax 0.5mg PO Q12H    #Misc  - DVT Prophylaxis: Lovenox 60mg SQ Q12H  - Diet: NPO for procedur; will restart Dysphagia 3 diet with thin liquids, DASH/TLC, consistent carbohydrates with evening snack afterwards  - GI Prophylaxis: Pantoprazole 40mg PO QD  - Activity: Increase as tolerated  - IV Fluids: Encourage PO intake  - Code Status: DNR/DNI    Dispo: From home

## 2019-09-28 NOTE — PROGRESS NOTE ADULT - SUBJECTIVE AND OBJECTIVE BOX
Interval history: Patient remains fluid overloaded. She is hemodynamically stable lying in bed.       HPI       69 y/o F with h/o DM, recent hip replacement admitted with c/o sob that started acutely 6 days ago. Per family, patient was lethargic, sob and complaint of nausea. She also felt chest discomfort that radiated to the back. Upon admission, an echocardiogram showed LVEF ~ 15% from ~ 45% one month earlier. His troponin were elevated and pro-BNP was 20 k. A lHC showed triple vessel disease with 80% disease of LM.       PMH: DM, Hip fracture     Social: Denies ETOH, smoking     FH: father  from heart disease in his 50s, mother  in her 30s from cancer

## 2019-09-28 NOTE — CONSULT NOTE ADULT - ASSESSMENT
69 yo P3 postmenopausal, h/o DM, HF, afib, w/ stage IV uterine prolapse, urinary retention 2/2 uncontrolled DM, chronic montejo in place, currently clinically an and hemodynamically stable.    - montejo replaced at bedside  - f/up Ucx  - follow up outpatient with Dr. Rodriguez after discharge for continued management of montejo catheter and urinary retention    Discussed with Dr. Yuen and to be discussed with Dr. Rodriguez 69 yo P3 postmenopausal, h/o DM, HF, afib, w/ stage IV uterine prolapse, urinary retention 2/2 uncontrolled DM, chronic montejo in place, currently clinically an and hemodynamically stable.    - montejo replaced at bedside  - f/up Ucx  - follow up outpatient with Dr. Rodriguez after discharge for continued management of montejo catheter and urinary retention    Discussed with Dr. Yuen and Dr. Rodriguez

## 2019-09-28 NOTE — PROGRESS NOTE ADULT - ASSESSMENT
65 y/o F with h/o DM admitted with c/o acute onset of SOB associated with nausea, back pain and weakness. Patient with positive troponin on admission and LVEF of ~ 15%. 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 Patient currently in acute heart failure exacerbation

## 2019-09-28 NOTE — CONSULT NOTE ADULT - SUBJECTIVE AND OBJECTIVE BOX
Chief Complaint: needs montejo replacement    HPI: 67 yo P3 postmenopausal w/ PMH uncontrolled diabetes, heart failure, myopathy, paroxysmal afib, LGIB w/ normal colonoscopy, s/p recent hip fracture w/ repair, uterine prolapse, urinary retention with montejo in place admitted for NSTEMI and respiratory distress. Pt s/p cardiac cath 9/27 demonstrating triple vessel disease with plan to return for PCI. Found to have c. diff during stay currently on vanc. She has history of prolapse for the past 10 years. Had acute urinary retention 3 weeks ago. Seen by Dr. Rodriguez (urogynecology), s/p failed pessary placement and urodynamic studies. Determined retention likely due to uncontrolled DM (A1c 15%) rather than prolapse. Plan for estrogen, baclofen, flomax and return in October with possible referral for suprapubic catheter placement if current management fails. Catheter replaced at this time. GYN consulted for montejo replacement. Pt has no complaints at this time. Denies any episodes of PMB. Previously treated for UTI, last Ucx negative.     Ob/Gyn History:  P3, NSVDx3                 LMP -  approx 15yrs ago  Denies history of ovarian cysts, uterine fibroids, abnormal paps, or STIs    Denies the following: constitutional symptoms, visual symptoms, cardiovascular symptoms, respiratory symptoms, GI symptoms, musculoskeletal symptoms, skin symptoms, neurologic symptoms, hematologic symptoms, allergic symptoms, psychiatric symptoms  Except any pertinent positives listed.     Home Medications:  aspirin 81 mg oral tablet: 1 tab(s) orally once a day (29 Aug 2019 20:19)    Allergies: No Known Allergies    PAST MEDICAL & SURGICAL HISTORY:  Female bladder prolapse  Diabetes  myopathy  paroxysmal afib  heart failure  urinary retention  uterine prolapse  History of repair of hip fracture  cataract surgery      FAMILY HISTORY:  FH: myocardial infarction  FH: stomach cancer      SOCIAL HISTORY: Denies cigarette use, alcohol use, or illicit drug use    Vital Signs Last 24 Hrs  T(F): 96 (28 Sep 2019 07:00), Max: 97.2 (27 Sep 2019 12:00)  HR: 72 (28 Sep 2019 10:00) (72 - 106)  BP: 85/55 (28 Sep 2019 10:00) (85/55 - 116/77)  RR: 19 (28 Sep 2019 10:00) (16 - 44)    General Appearance - AAOx3, NAD  Heart - S1S2 regular rate and rhythm  Lung - CTA Bilaterally  Abdomen - Soft, nontender, nondistended, no rebound, no rigidity, no guarding, bowel sounds present    GYN/Pelvis:  stage IV uterine prolapse evident   montejo catheter removed, new 16fr catheter reinserted, Ucx collected at time of insertion      LABS:                        9.2    7.20  )-----------( 353      ( 28 Sep 2019 04:55 )             28.7         09-28    141  |  104  |  55<H>  ----------------------------<  110<H>  4.0   |  21  |  1.1    Ca    9.6      28 Sep 2019 04:55  Phos  3.0     09-28  Mg     2.1     09-28    TPro  6.0  /  Alb  3.2<L>  /  TBili  0.2  /  DBili  x   /  AST  11  /  ALT  27  /  AlkPhos  143<H>  09-28    PT/INR - ( 27 Sep 2019 04:28 )   PT: 13.40 sec;   INR: 1.17 ratio         PTT - ( 27 Sep 2019 04:28 )  PTT:30.6 sec      Culture - Urine (collected 09-22-19 @ 13:45)  Source: .Urine Catheterized  Final Report (09-23-19 @ 18:27):    No growth    Culture - Urine (collected 09-21-19 @ 18:14)  Source: .Urine Catheterized  Final Report (09-24-19 @ 12:32):    >100,000 CFU/ml Serratia marcescens  Organism: Serratia marcescens (09-24-19 @ 12:32)  Organism: Serratia marcescens (09-24-19 @ 12:32)      -  Amikacin: S <=16      -  Ampicillin: R 16 These ampicillin results predict results for amoxicillin      -  Ampicillin/Sulbactam: R <=8/4 Enterobacter, Citrobacter, and Serratia may develop resistance during prolonged therapy (3-4 days)      -  Aztreonam: S <=4      -  Cefazolin: R >16      -  Cefepime: S <=4      -  Cefoxitin: R <=8      -  Ceftriaxone: S <=1 Enterobacter, Citrobacter, and Serratia may develop resistance during prolonged therapy      -  Ciprofloxacin: S <=1      -  Ertapenem: S <=1      -  Gentamicin: S <=4      -  Imipenem: S <=1      -  Levofloxacin: S <=2      -  Meropenem: S <=1      -  Nitrofurantoin: R >64 Should not be used to treat pyelonephritis      -  Piperacillin/Tazobactam: S <=16      -  Tigecycline: S <=2      -  Tobramycin: S <=4      -  Trimethoprim/Sulfamethoxazole: S <=2/38      Method Type: GENARO        RADIOLOGY & ADDITIONAL STUDIES:  < from: CT Angio Abdomen and Pelvis w/ IV Cont (08.29.19 @ 17:18) >  FINDINGS:    LOWER CHEST: Normal size heart. Dependent atelectasis.    HEPATOBILIARY:  Cholelithiasis in a distended gallbladder. Subcentimeter   right hepatic hypodensity to small to characterize.    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    PANCREAS: Unremarkable.    ADRENAL GLANDS: Unremarkable.    KIDNEYS: 1.5 cm indeterminate hypoattenuating left renal lesion (series 7   image 43) measuring higher than simple fluid in attenuation even on   noncontrast images. Additional hypodensities too small to characterize.   Bilateral extrarenal pelves. No hydronephrosis.    ABDOMINOPELVIC NODES: No lymphadenopathy.    PELVIC ORGANS: Markedly distended urinary bladder.    PERITONEUM/MESENTERY/BOWEL: No CT evidence of contrast extravasation into   the GI tract. Moderate stool in the colon. No bowel obstruction, ascites   or intraperitoneal free air.    BONES/SOFT TISSUES: Status post left intramedullary nail for   intertrochanteric fracture. Fracture line remains visible.    VASCULAR: Atherosclerotic calcifications.      IMPRESSION:    1.  No CTA evidence of acute gastrointestinal hemorrhage.  2.  Markedly distended urinary bladder. Correlate for urinary retention.  3.  Indeterminate left upper pole renal lesion. Further evaluation with   contrast-enhanced MRI on a nonemergent basis recommended.  4.  Status post fixation of left femoral fracture. Fracture line remains   visible. Chief Complaint: needs montejo replacement    HPI: 69 yo P3 postmenopausal w/ PMH uncontrolled diabetes, heart failure, myopathy, paroxysmal afib, LGIB w/ normal colonoscopy, s/p recent hip fracture w/ repair, uterine prolapse, urinary retention with montejo in place admitted for NSTEMI and respiratory distress. Pt s/p cardiac cath 9/27 demonstrating triple vessel disease with plan to return for PCI. Found to have c. diff during stay currently on vanc. She has history of prolapse for the past 10 years. Pt had montejo placed 8/31 during prior admission for urinary retention after failed TOV. Seen by Dr. Rodriguez (urogynecology), s/p failed pessary placement and urodynamic studies. Determined retention likely due to uncontrolled DM (A1c 15%) rather than prolapse. Plan for estrogen, baclofen, flomax and return in October with possible referral for suprapubic catheter placement if current management fails. GYN consulted for montejo replacement. Pt has no complaints at this time. Denies any episodes of PMB. Previously treated for UTI, last Ucx negative.     Ob/Gyn History:  P3, NSVDx3                 LMP -  approx 15yrs ago  Denies history of ovarian cysts, uterine fibroids, abnormal paps, or STIs    Denies the following: constitutional symptoms, visual symptoms, cardiovascular symptoms, respiratory symptoms, GI symptoms, musculoskeletal symptoms, skin symptoms, neurologic symptoms, hematologic symptoms, allergic symptoms, psychiatric symptoms  Except any pertinent positives listed.     Home Medications:  aspirin 81 mg oral tablet: 1 tab(s) orally once a day (29 Aug 2019 20:19)    Allergies: No Known Allergies    PAST MEDICAL & SURGICAL HISTORY:  Female bladder prolapse  Diabetes  myopathy  paroxysmal afib  heart failure  urinary retention  uterine prolapse  History of repair of hip fracture  cataract surgery      FAMILY HISTORY:  FH: myocardial infarction  FH: stomach cancer      SOCIAL HISTORY: Denies cigarette use, alcohol use, or illicit drug use    Vital Signs Last 24 Hrs  T(F): 96 (28 Sep 2019 07:00), Max: 97.2 (27 Sep 2019 12:00)  HR: 72 (28 Sep 2019 10:00) (72 - 106)  BP: 85/55 (28 Sep 2019 10:00) (85/55 - 116/77)  RR: 19 (28 Sep 2019 10:00) (16 - 44)    General Appearance - AAOx3, NAD  Heart - S1S2 regular rate and rhythm  Lung - CTA Bilaterally  Abdomen - Soft, nontender, nondistended, no rebound, no rigidity, no guarding, bowel sounds present    GYN/Pelvis:  stage IV uterine prolapse evident   montejo catheter removed, new 16fr catheter reinserted, Ucx collected at time of insertion      LABS:                        9.2    7.20  )-----------( 353      ( 28 Sep 2019 04:55 )             28.7         09-28    141  |  104  |  55<H>  ----------------------------<  110<H>  4.0   |  21  |  1.1    Ca    9.6      28 Sep 2019 04:55  Phos  3.0     09-28  Mg     2.1     09-28    TPro  6.0  /  Alb  3.2<L>  /  TBili  0.2  /  DBili  x   /  AST  11  /  ALT  27  /  AlkPhos  143<H>  09-28    PT/INR - ( 27 Sep 2019 04:28 )   PT: 13.40 sec;   INR: 1.17 ratio         PTT - ( 27 Sep 2019 04:28 )  PTT:30.6 sec      Culture - Urine (collected 09-22-19 @ 13:45)  Source: .Urine Catheterized  Final Report (09-23-19 @ 18:27):    No growth    Culture - Urine (collected 09-21-19 @ 18:14)  Source: .Urine Catheterized  Final Report (09-24-19 @ 12:32):    >100,000 CFU/ml Serratia marcescens  Organism: Serratia marcescens (09-24-19 @ 12:32)  Organism: Serratia marcescens (09-24-19 @ 12:32)      -  Amikacin: S <=16      -  Ampicillin: R 16 These ampicillin results predict results for amoxicillin      -  Ampicillin/Sulbactam: R <=8/4 Enterobacter, Citrobacter, and Serratia may develop resistance during prolonged therapy (3-4 days)      -  Aztreonam: S <=4      -  Cefazolin: R >16      -  Cefepime: S <=4      -  Cefoxitin: R <=8      -  Ceftriaxone: S <=1 Enterobacter, Citrobacter, and Serratia may develop resistance during prolonged therapy      -  Ciprofloxacin: S <=1      -  Ertapenem: S <=1      -  Gentamicin: S <=4      -  Imipenem: S <=1      -  Levofloxacin: S <=2      -  Meropenem: S <=1      -  Nitrofurantoin: R >64 Should not be used to treat pyelonephritis      -  Piperacillin/Tazobactam: S <=16      -  Tigecycline: S <=2      -  Tobramycin: S <=4      -  Trimethoprim/Sulfamethoxazole: S <=2/38      Method Type: GENARO        RADIOLOGY & ADDITIONAL STUDIES:  < from: CT Angio Abdomen and Pelvis w/ IV Cont (08.29.19 @ 17:18) >  FINDINGS:    LOWER CHEST: Normal size heart. Dependent atelectasis.    HEPATOBILIARY:  Cholelithiasis in a distended gallbladder. Subcentimeter   right hepatic hypodensity to small to characterize.    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    PANCREAS: Unremarkable.    ADRENAL GLANDS: Unremarkable.    KIDNEYS: 1.5 cm indeterminate hypoattenuating left renal lesion (series 7   image 43) measuring higher than simple fluid in attenuation even on   noncontrast images. Additional hypodensities too small to characterize.   Bilateral extrarenal pelves. No hydronephrosis.    ABDOMINOPELVIC NODES: No lymphadenopathy.    PELVIC ORGANS: Markedly distended urinary bladder.    PERITONEUM/MESENTERY/BOWEL: No CT evidence of contrast extravasation into   the GI tract. Moderate stool in the colon. No bowel obstruction, ascites   or intraperitoneal free air.    BONES/SOFT TISSUES: Status post left intramedullary nail for   intertrochanteric fracture. Fracture line remains visible.    VASCULAR: Atherosclerotic calcifications.      IMPRESSION:    1.  No CTA evidence of acute gastrointestinal hemorrhage.  2.  Markedly distended urinary bladder. Correlate for urinary retention.  3.  Indeterminate left upper pole renal lesion. Further evaluation with   contrast-enhanced MRI on a nonemergent basis recommended.  4.  Status post fixation of left femoral fracture. Fracture line remains   visible. Chief Complaint: needs montejo replacement    HPI: 69 yo P3 postmenopausal w/ PMH uncontrolled diabetes, heart failure, myopathy, paroxysmal afib, lower GI bleed w/ normal colonoscopy, s/p recent hip fracture w/ repair, uterine prolapse, urinary retention with montejo in place admitted for NSTEMI and respiratory distress. Pt s/p cardiac cath 9/27 demonstrating triple vessel disease with plan to return for PCI. Found to have c. diff during stay currently on vanc. She has history of prolapse for the past 10 years. Pt had montejo placed 8/31 during prior admission for urinary retention after failed TOV. Seen by Dr. Rodriguez (urogynecology), s/p failed pessary placement and urodynamic studies. Determined retention likely due to uncontrolled DM (A1c 15%) rather than prolapse. Plan for estrogen, baclofen, flomax and return in October with possible referral for suprapubic catheter placement if current management fails. GYN consulted for montejo replacement. Pt has no complaints at this time. Denies any episodes of PMB. Previously treated for UTI, last Ucx negative.     Ob/Gyn History:  P3, NSVDx3                 LMP -  approx 15yrs ago  Denies history of ovarian cysts, uterine fibroids, abnormal paps, or STIs    Denies the following: constitutional symptoms, visual symptoms, cardiovascular symptoms, respiratory symptoms, GI symptoms, musculoskeletal symptoms, skin symptoms, neurologic symptoms, hematologic symptoms, allergic symptoms, psychiatric symptoms  Except any pertinent positives listed.     Home Medications:  aspirin 81 mg oral tablet: 1 tab(s) orally once a day (29 Aug 2019 20:19)    Allergies: No Known Allergies    PAST MEDICAL & SURGICAL HISTORY:  Female bladder prolapse  Diabetes  myopathy  paroxysmal afib  heart failure  urinary retention  uterine prolapse  History of repair of hip fracture  cataract surgery      FAMILY HISTORY:  FH: myocardial infarction  FH: stomach cancer      SOCIAL HISTORY: Denies cigarette use, alcohol use, or illicit drug use    Vital Signs Last 24 Hrs  T(F): 96 (28 Sep 2019 07:00), Max: 97.2 (27 Sep 2019 12:00)  HR: 72 (28 Sep 2019 10:00) (72 - 106)  BP: 85/55 (28 Sep 2019 10:00) (85/55 - 116/77)  RR: 19 (28 Sep 2019 10:00) (16 - 44)    General Appearance - AAOx3, NAD  Heart - S1S2 regular rate and rhythm  Lung - CTA Bilaterally  Abdomen - Soft, nontender, nondistended, no rebound, no rigidity, no guarding, bowel sounds present    GYN/Pelvis:  stage IV uterine prolapse evident  montejo catheter removed, new 16fr catheter reinserted without difficulty, Ucx collected at time of insertion      LABS:                        9.2    7.20  )-----------( 353      ( 28 Sep 2019 04:55 )             28.7         09-28    141  |  104  |  55<H>  ----------------------------<  110<H>  4.0   |  21  |  1.1    Ca    9.6      28 Sep 2019 04:55  Phos  3.0     09-28  Mg     2.1     09-28    TPro  6.0  /  Alb  3.2<L>  /  TBili  0.2  /  DBili  x   /  AST  11  /  ALT  27  /  AlkPhos  143<H>  09-28    PT/INR - ( 27 Sep 2019 04:28 )   PT: 13.40 sec;   INR: 1.17 ratio         PTT - ( 27 Sep 2019 04:28 )  PTT:30.6 sec      Culture - Urine (collected 09-22-19 @ 13:45)  Source: .Urine Catheterized  Final Report (09-23-19 @ 18:27):    No growth    Culture - Urine (collected 09-21-19 @ 18:14)  Source: .Urine Catheterized  Final Report (09-24-19 @ 12:32):    >100,000 CFU/ml Serratia marcescens  Organism: Serratia marcescens (09-24-19 @ 12:32)  Organism: Serratia marcescens (09-24-19 @ 12:32)      -  Amikacin: S <=16      -  Ampicillin: R 16 These ampicillin results predict results for amoxicillin      -  Ampicillin/Sulbactam: R <=8/4 Enterobacter, Citrobacter, and Serratia may develop resistance during prolonged therapy (3-4 days)      -  Aztreonam: S <=4      -  Cefazolin: R >16      -  Cefepime: S <=4      -  Cefoxitin: R <=8      -  Ceftriaxone: S <=1 Enterobacter, Citrobacter, and Serratia may develop resistance during prolonged therapy      -  Ciprofloxacin: S <=1      -  Ertapenem: S <=1      -  Gentamicin: S <=4      -  Imipenem: S <=1      -  Levofloxacin: S <=2      -  Meropenem: S <=1      -  Nitrofurantoin: R >64 Should not be used to treat pyelonephritis      -  Piperacillin/Tazobactam: S <=16      -  Tigecycline: S <=2      -  Tobramycin: S <=4      -  Trimethoprim/Sulfamethoxazole: S <=2/38      Method Type: GENARO        RADIOLOGY & ADDITIONAL STUDIES:  < from: CT Angio Abdomen and Pelvis w/ IV Cont (08.29.19 @ 17:18) >  FINDINGS:    LOWER CHEST: Normal size heart. Dependent atelectasis.    HEPATOBILIARY:  Cholelithiasis in a distended gallbladder. Subcentimeter   right hepatic hypodensity to small to characterize.    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    PANCREAS: Unremarkable.    ADRENAL GLANDS: Unremarkable.    KIDNEYS: 1.5 cm indeterminate hypoattenuating left renal lesion (series 7   image 43) measuring higher than simple fluid in attenuation even on   noncontrast images. Additional hypodensities too small to characterize.   Bilateral extrarenal pelves. No hydronephrosis.    ABDOMINOPELVIC NODES: No lymphadenopathy.    PELVIC ORGANS: Markedly distended urinary bladder.    PERITONEUM/MESENTERY/BOWEL: No CT evidence of contrast extravasation into   the GI tract. Moderate stool in the colon. No bowel obstruction, ascites   or intraperitoneal free air.    BONES/SOFT TISSUES: Status post left intramedullary nail for   intertrochanteric fracture. Fracture line remains visible.    VASCULAR: Atherosclerotic calcifications.      IMPRESSION:    1.  No CTA evidence of acute gastrointestinal hemorrhage.  2.  Markedly distended urinary bladder. Correlate for urinary retention.  3.  Indeterminate left upper pole renal lesion. Further evaluation with   contrast-enhanced MRI on a nonemergent basis recommended.  4.  Status post fixation of left femoral fracture. Fracture line remains   visible.

## 2019-09-28 NOTE — PROGRESS NOTE ADULT - ASSESSMENT
Patient is a 69yo female with PMHx of diabetes mellitus, paroxysmal atrial fibrillation, history of lower GI bleed and recently admitted for hip fracture who presented with acute hypoxic respiratory failure secondary to acute on chronic HFrEF s/p intubation. Found to have NSTEMI.    Acute hypoxic respiratory failure like due to Acute systolic and diastolic heart failure s/p extubation  - Patient currently on nasal cannula, pale, answers questions appropriately  - LVEF 15% (previously 45% 1 month ago approx)  - On nitro and lasix GTT  - monitor I&O, weights, electrolytes, LFTs and renal function  - Keep MAP >65    NSTEMI/3V CAD  - 3 vessel CAD (LM, RCA, LCX, and LAD) on cardiac cath 9/27  - cardiology and CT surgery on board  - ?? IABP given poor surgical candidate  - c/w DAPT and AC    CDIFF +  - no further loose BM  - Continue with vancomycin 125mg PO Q6H for 10 days     Diabetic ketoacidosis - resolved  - c/w Lantus and lispro  - monitor FS    PAF, currently in sinus  - Continue with amiodarone 200mg PO BID  - Continue with digoxin 0.125mg PO QD    Uterine Prolapse with chronic montejo  - catheter exchanged by GYN today  - outpatient follow up with Dr Rodriguez    #Progress Note Handoff  Pending (specify): clinical stability    Family discussion: Plan of care discussed with patient, aware and agreeable   Disposition:  unknown at this time    Patient remains high risk, requires continuous monitoring in CCU

## 2019-09-29 LAB
ALBUMIN SERPL ELPH-MCNC: 3.2 G/DL — LOW (ref 3.5–5.2)
ALP SERPL-CCNC: 142 U/L — HIGH (ref 30–115)
ALT FLD-CCNC: 22 U/L — SIGNIFICANT CHANGE UP (ref 0–41)
ANION GAP SERPL CALC-SCNC: 13 MMOL/L — SIGNIFICANT CHANGE UP (ref 7–14)
ANION GAP SERPL CALC-SCNC: 15 MMOL/L — HIGH (ref 7–14)
AST SERPL-CCNC: 16 U/L — SIGNIFICANT CHANGE UP (ref 0–41)
BILIRUB SERPL-MCNC: 0.3 MG/DL — SIGNIFICANT CHANGE UP (ref 0.2–1.2)
BUN SERPL-MCNC: 45 MG/DL — HIGH (ref 10–20)
BUN SERPL-MCNC: 47 MG/DL — HIGH (ref 10–20)
CALCIUM SERPL-MCNC: 9.4 MG/DL — SIGNIFICANT CHANGE UP (ref 8.5–10.1)
CALCIUM SERPL-MCNC: 9.5 MG/DL — SIGNIFICANT CHANGE UP (ref 8.5–10.1)
CHLORIDE SERPL-SCNC: 104 MMOL/L — SIGNIFICANT CHANGE UP (ref 98–110)
CHLORIDE SERPL-SCNC: 106 MMOL/L — SIGNIFICANT CHANGE UP (ref 98–110)
CO2 SERPL-SCNC: 21 MMOL/L — SIGNIFICANT CHANGE UP (ref 17–32)
CO2 SERPL-SCNC: 24 MMOL/L — SIGNIFICANT CHANGE UP (ref 17–32)
CREAT SERPL-MCNC: 1.1 MG/DL — SIGNIFICANT CHANGE UP (ref 0.7–1.5)
CREAT SERPL-MCNC: 1.1 MG/DL — SIGNIFICANT CHANGE UP (ref 0.7–1.5)
CULTURE RESULTS: SIGNIFICANT CHANGE UP
GLUCOSE BLDC GLUCOMTR-MCNC: 123 MG/DL — HIGH (ref 70–99)
GLUCOSE BLDC GLUCOMTR-MCNC: 126 MG/DL — HIGH (ref 70–99)
GLUCOSE BLDC GLUCOMTR-MCNC: 138 MG/DL — HIGH (ref 70–99)
GLUCOSE BLDC GLUCOMTR-MCNC: 139 MG/DL — HIGH (ref 70–99)
GLUCOSE SERPL-MCNC: 118 MG/DL — HIGH (ref 70–99)
GLUCOSE SERPL-MCNC: 90 MG/DL — SIGNIFICANT CHANGE UP (ref 70–99)
HCT VFR BLD CALC: 30.3 % — LOW (ref 37–47)
HGB BLD-MCNC: 9.5 G/DL — LOW (ref 12–16)
MAGNESIUM SERPL-MCNC: 1.9 MG/DL — SIGNIFICANT CHANGE UP (ref 1.8–2.4)
MAGNESIUM SERPL-MCNC: 2.1 MG/DL — SIGNIFICANT CHANGE UP (ref 1.8–2.4)
MCHC RBC-ENTMCNC: 25.7 PG — LOW (ref 27–31)
MCHC RBC-ENTMCNC: 31.4 G/DL — LOW (ref 32–37)
MCV RBC AUTO: 81.9 FL — SIGNIFICANT CHANGE UP (ref 81–99)
NRBC # BLD: 0 /100 WBCS — SIGNIFICANT CHANGE UP (ref 0–0)
PHOSPHATE SERPL-MCNC: 2.6 MG/DL — SIGNIFICANT CHANGE UP (ref 2.1–4.9)
PLATELET # BLD AUTO: 385 K/UL — SIGNIFICANT CHANGE UP (ref 130–400)
POTASSIUM SERPL-MCNC: 3.8 MMOL/L — SIGNIFICANT CHANGE UP (ref 3.5–5)
POTASSIUM SERPL-MCNC: 4.1 MMOL/L — SIGNIFICANT CHANGE UP (ref 3.5–5)
POTASSIUM SERPL-SCNC: 3.8 MMOL/L — SIGNIFICANT CHANGE UP (ref 3.5–5)
POTASSIUM SERPL-SCNC: 4.1 MMOL/L — SIGNIFICANT CHANGE UP (ref 3.5–5)
PROT SERPL-MCNC: 6.3 G/DL — SIGNIFICANT CHANGE UP (ref 6–8)
RBC # BLD: 3.7 M/UL — LOW (ref 4.2–5.4)
RBC # FLD: 14.5 % — SIGNIFICANT CHANGE UP (ref 11.5–14.5)
SODIUM SERPL-SCNC: 141 MMOL/L — SIGNIFICANT CHANGE UP (ref 135–146)
SODIUM SERPL-SCNC: 142 MMOL/L — SIGNIFICANT CHANGE UP (ref 135–146)
SPECIMEN SOURCE: SIGNIFICANT CHANGE UP
WBC # BLD: 6.06 K/UL — SIGNIFICANT CHANGE UP (ref 4.8–10.8)
WBC # FLD AUTO: 6.06 K/UL — SIGNIFICANT CHANGE UP (ref 4.8–10.8)

## 2019-09-29 PROCEDURE — 99233 SBSQ HOSP IP/OBS HIGH 50: CPT

## 2019-09-29 PROCEDURE — 71045 X-RAY EXAM CHEST 1 VIEW: CPT | Mod: 26

## 2019-09-29 PROCEDURE — 93010 ELECTROCARDIOGRAM REPORT: CPT

## 2019-09-29 RX ORDER — CAPTOPRIL 12.5 MG/1
6.25 TABLET ORAL THREE TIMES A DAY
Refills: 0 | Status: DISCONTINUED | OUTPATIENT
Start: 2019-09-29 | End: 2019-09-29

## 2019-09-29 RX ORDER — CAPTOPRIL 12.5 MG/1
6.25 TABLET ORAL THREE TIMES A DAY
Refills: 0 | Status: COMPLETED | OUTPATIENT
Start: 2019-09-29 | End: 2019-09-30

## 2019-09-29 RX ORDER — SPIRONOLACTONE 25 MG/1
25 TABLET, FILM COATED ORAL DAILY
Refills: 0 | Status: DISCONTINUED | OUTPATIENT
Start: 2019-09-29 | End: 2019-10-04

## 2019-09-29 RX ORDER — NITROGLYCERIN 6.5 MG
20 CAPSULE, EXTENDED RELEASE ORAL
Qty: 50 | Refills: 0 | Status: DISCONTINUED | OUTPATIENT
Start: 2019-09-29 | End: 2019-09-29

## 2019-09-29 RX ORDER — ACETAMINOPHEN 500 MG
650 TABLET ORAL ONCE
Refills: 0 | Status: COMPLETED | OUTPATIENT
Start: 2019-09-29 | End: 2019-09-29

## 2019-09-29 RX ADMIN — CEFEPIME 100 MILLIGRAM(S): 1 INJECTION, POWDER, FOR SOLUTION INTRAMUSCULAR; INTRAVENOUS at 21:21

## 2019-09-29 RX ADMIN — CAPTOPRIL 6.25 MILLIGRAM(S): 12.5 TABLET ORAL at 15:24

## 2019-09-29 RX ADMIN — Medication 650 MILLIGRAM(S): at 21:30

## 2019-09-29 RX ADMIN — CEFEPIME 100 MILLIGRAM(S): 1 INJECTION, POWDER, FOR SOLUTION INTRAMUSCULAR; INTRAVENOUS at 13:25

## 2019-09-29 RX ADMIN — Medication 0.5 MILLIGRAM(S): at 05:22

## 2019-09-29 RX ADMIN — Medication 0.5 MILLIGRAM(S): at 17:04

## 2019-09-29 RX ADMIN — CEFEPIME 100 MILLIGRAM(S): 1 INJECTION, POWDER, FOR SOLUTION INTRAMUSCULAR; INTRAVENOUS at 05:05

## 2019-09-29 RX ADMIN — Medication 125 MILLIGRAM(S): at 11:17

## 2019-09-29 RX ADMIN — Medication 650 MILLIGRAM(S): at 21:23

## 2019-09-29 RX ADMIN — AMIODARONE HYDROCHLORIDE 200 MILLIGRAM(S): 400 TABLET ORAL at 17:04

## 2019-09-29 RX ADMIN — AMIODARONE HYDROCHLORIDE 200 MILLIGRAM(S): 400 TABLET ORAL at 05:05

## 2019-09-29 RX ADMIN — SPIRONOLACTONE 25 MILLIGRAM(S): 25 TABLET, FILM COATED ORAL at 06:18

## 2019-09-29 RX ADMIN — CAPTOPRIL 6.25 MILLIGRAM(S): 12.5 TABLET ORAL at 21:21

## 2019-09-29 RX ADMIN — Medication 125 MILLIGRAM(S): at 00:46

## 2019-09-29 RX ADMIN — INSULIN GLARGINE 15 UNIT(S): 100 INJECTION, SOLUTION SUBCUTANEOUS at 22:05

## 2019-09-29 RX ADMIN — Medication 125 MILLIGRAM(S): at 05:05

## 2019-09-29 RX ADMIN — CLOPIDOGREL BISULFATE 75 MILLIGRAM(S): 75 TABLET, FILM COATED ORAL at 11:17

## 2019-09-29 RX ADMIN — Medication 81 MILLIGRAM(S): at 11:17

## 2019-09-29 RX ADMIN — LIDOCAINE 2 PATCH: 4 CREAM TOPICAL at 23:03

## 2019-09-29 RX ADMIN — PANTOPRAZOLE SODIUM 40 MILLIGRAM(S): 20 TABLET, DELAYED RELEASE ORAL at 08:16

## 2019-09-29 RX ADMIN — ENOXAPARIN SODIUM 60 MILLIGRAM(S): 100 INJECTION SUBCUTANEOUS at 17:04

## 2019-09-29 RX ADMIN — LIDOCAINE 2 PATCH: 4 CREAM TOPICAL at 17:02

## 2019-09-29 RX ADMIN — Medication 5 UNIT(S): at 08:16

## 2019-09-29 RX ADMIN — Medication 0.12 MILLIGRAM(S): at 21:21

## 2019-09-29 RX ADMIN — ATORVASTATIN CALCIUM 80 MILLIGRAM(S): 80 TABLET, FILM COATED ORAL at 21:21

## 2019-09-29 RX ADMIN — LIDOCAINE 2 PATCH: 4 CREAM TOPICAL at 11:18

## 2019-09-29 RX ADMIN — Medication 125 MILLIGRAM(S): at 17:04

## 2019-09-29 RX ADMIN — Medication 125 MILLIGRAM(S): at 23:21

## 2019-09-29 RX ADMIN — ENOXAPARIN SODIUM 60 MILLIGRAM(S): 100 INJECTION SUBCUTANEOUS at 05:07

## 2019-09-29 RX ADMIN — Medication 5 UNIT(S): at 16:59

## 2019-09-29 RX ADMIN — Medication 5 UNIT(S): at 12:18

## 2019-09-29 RX ADMIN — CHLORHEXIDINE GLUCONATE 1 APPLICATION(S): 213 SOLUTION TOPICAL at 05:19

## 2019-09-29 NOTE — PROGRESS NOTE ADULT - SUBJECTIVE AND OBJECTIVE BOX
SUBJ:  No new complains    MEDICATIONS  (STANDING):  ALPRAZolam 0.5 milliGRAM(s) Oral two times a day  amiodarone    Tablet 200 milliGRAM(s) Oral two times a day  aspirin  chewable 81 milliGRAM(s) Oral daily  atorvastatin 80 milliGRAM(s) Oral daily  cefepime   IVPB      cefepime   IVPB 1000 milliGRAM(s) IV Intermittent every 8 hours  chlorhexidine 4% Liquid 1 Application(s) Topical <User Schedule>  clopidogrel Tablet 75 milliGRAM(s) Oral daily  dextrose 5%. 1000 milliLiter(s) (50 mL/Hr) IV Continuous <Continuous>  dextrose 50% Injectable 12.5 Gram(s) IV Push once  dextrose 50% Injectable 25 Gram(s) IV Push once  dextrose 50% Injectable 25 Gram(s) IV Push once  digoxin     Tablet 0.125 milliGRAM(s) Oral daily  enoxaparin Injectable 60 milliGRAM(s) SubCutaneous every 12 hours  furosemide Infusion 8 mG/Hr (4 mL/Hr) IV Continuous <Continuous>  insulin glargine Injectable (LANTUS) 15 Unit(s) SubCutaneous at bedtime  insulin lispro (HumaLOG) corrective regimen sliding scale   SubCutaneous three times a day before meals  insulin lispro Injectable (HumaLOG) 5 Unit(s) SubCutaneous three times a day before meals  lidocaine   Patch 2 Patch Transdermal daily  nitroglycerin  Infusion 40 MICROgram(s)/Min (12 mL/Hr) IV Continuous <Continuous>  ondansetron Injectable 4 milliGRAM(s) IV Push once  pantoprazole    Tablet 40 milliGRAM(s) Oral before breakfast  spironolactone 25 milliGRAM(s) Oral daily  vancomycin    Solution 125 milliGRAM(s) Oral every 6 hours    MEDICATIONS  (PRN):  dextrose 40% Gel 15 Gram(s) Oral once PRN Blood Glucose LESS THAN 70 milliGRAM(s)/deciliter  glucagon  Injectable 1 milliGRAM(s) IntraMuscular once PRN Glucose LESS THAN 70 milligrams/deciliter            Vital Signs Last 24 Hrs  T(C): 36.1 (29 Sep 2019 08:00), Max: 37 (29 Sep 2019 04:00)  T(F): 97 (29 Sep 2019 08:00), Max: 98.6 (29 Sep 2019 04:00)  HR: 81 (29 Sep 2019 08:00) (72 - 92)  BP: 102/48 (29 Sep 2019 08:00) (85/55 - 120/65)  BP(mean): 61 (29 Sep 2019 08:00) (59 - 84)  RR: 18 (29 Sep 2019 08:00) (16 - 35)  SpO2: 99% (29 Sep 2019 08:00) (97% - 100%)     REVIEW OF SYSTEMS:  CONSTITUTIONAL: No fever, weight loss, or fatigue  CARDIOLOGY: PAtient denies chest pain, shortness of breath or syncopal episodes.   RESPIRATORY: denies shortness of breath, wheezeing.   NEUROLOGICAL: NO weakness, no focal deficits to report.  ENDOCRINOLOGICAL: no recent change in diabetic medications.   GI: no BRBPR, no N,V,diarrhea.    PSYCHIATRY: normal mood and affect  HEENT: no nasal discharge, no ecchymosis  SKIN: no ecchymosis, no breakdown  MUSCULOSKELETAL: Full range of motion x4.        PHYSICAL EXAM:  · CONSTITUTIONAL:	Well-developed, well nourished    BMI-  ·RESPIRATORY:   airway patent; breath sounds equal; good air movement; respirations non-labored; clear to auscultation bilaterally; no chest wall tenderness; no intercostal retractions; no rales,rhonchi or wheeze  · CARDIOVASCULAR	regular rate and rhythm  no rub  no murmur  normal PMI  · EXTREMITIES: No cyanosis, clubbing or edema  · VASCULAR: 	Equal and normal pulses (carotid, femoral, dorsalis pedis)  	  TELEMETRY:    ECG:    TTE:    LABS:                        9.5    6.06  )-----------( 385      ( 29 Sep 2019 04:43 )             30.3     09-29    142  |  106  |  47<H>  ----------------------------<  90  4.1   |  21  |  1.1    Ca    9.5      29 Sep 2019 04:43  Phos  2.6     09-29  Mg     2.1     09-29    TPro  6.3  /  Alb  3.2<L>  /  TBili  0.3  /  DBili  x   /  AST  16  /  ALT  22  /  AlkPhos  142<H>  09-29            I&O's Summary    28 Sep 2019 07:01  -  29 Sep 2019 07:00  --------------------------------------------------------  IN: 609 mL / OUT: 2805 mL / NET: -2196 mL      BNP  RADIOLOGY & ADDITIONAL STUDIES:    IMPRESSION AND PLAN:    continue with current medication  Discussed with team and family

## 2019-09-29 NOTE — PROGRESS NOTE ADULT - ASSESSMENT
Patient is a 67yo female with PMHx of diabetes mellitus, paroxysmal atrial fibrillation, history of lower GI bleed and recently admitted for hip fracture who presented with acute hypoxic respiratory failure secondary to acute on chronic HFrEF s/p intubation. Found to have NSTEMI.    Acute hypoxic respiratory failure like due to Acute systolic and diastolic heart failure s/p extubation  - Patient currently on nasal cannula, more awake today answers questions appropriately  - LVEF 15% (previously 45% 1 month ago approx)  - On nitro and lasix GTT per cardio  - monitor I&O, weights, electrolytes, LFTs and renal function  - Keep MAP >65    NSTEMI/3V CAD  - 3 vessel CAD (LM, RCA, LCX, and LAD) on cardiac cath 9/27  - cardiology and CT surgery on board  - possible PCI early next week  - c/w DAPT and AC    CDIFF +  - no further loose BM  - Continue with vancomycin 125mg PO Q6H for 10 days today    Diabetic ketoacidosis - resolved  - c/w Lantus and lispro  - monitor FS, patient endorses poor appetite    PAF, currently in sinus  - Continue with amiodarone 200mg PO BID  - Continue with digoxin 0.125mg PO QD    Uterine Prolapse with chronic montejo  - catheter exchanged by GYN today  - outpatient follow up with Dr Rodriguez    #Progress Note Handoff  Pending (specify): clinical stability, further cardiac workup  Family discussion: Plan of care discussed with patient, aware and agreeable   Disposition:  unknown at this time    Patient remains critically ill, requires continuous monitoring in CCU

## 2019-09-29 NOTE — PROGRESS NOTE ADULT - SUBJECTIVE AND OBJECTIVE BOX
GUS WILBURN  68y Female    CHIEF COMPLAINT:    Patient is a 68y old  Female who presents with a chief complaint of HFrEF (29 Sep 2019 09:52)      INTERVAL HPI/OVERNIGHT EVENTS:    Patient seen and examined. No acute events overnight. Feels slightly better overall    ROS: All other systems are negative.    Vital Signs:    T(F): 97 (09-29-19 @ 08:00), Max: 98.6 (09-29-19 @ 04:00)  HR: 86 (09-29-19 @ 12:00) (78 - 92)  BP: 102/58 (09-29-19 @ 12:00) (80/44 - 120/65)  RR: 29 (09-29-19 @ 12:00) (16 - 35)  SpO2: 98% (09-29-19 @ 12:00) (97% - 100%)  I&O's Summary    28 Sep 2019 07:01  -  29 Sep 2019 07:00  --------------------------------------------------------  IN: 609 mL / OUT: 2805 mL / NET: -2196 mL    29 Sep 2019 07:01  -  29 Sep 2019 12:39  --------------------------------------------------------  IN: 192 mL / OUT: 700 mL / NET: -508 mL    POCT Blood Glucose.: 139 mg/dL (29 Sep 2019 12:10)  POCT Blood Glucose.: 123 mg/dL (29 Sep 2019 07:20)  POCT Blood Glucose.: 98 mg/dL (28 Sep 2019 21:56)  POCT Blood Glucose.: 151 mg/dL (28 Sep 2019 17:19)      PHYSICAL EXAM:    GENERAL:  NAD  SKIN: No rashes or lesions  HEENT: Atraumatic. Normocephalic.  NECK: Supple.  PULMONARY: CTA B/L. No wheezing. No rales  CVS: Normal S1, S2. Rate and Rhythm are regular.  ABDOMEN/GI: Soft, Nontender, Nondistended; BS present  MSK:  No edema B/L LE. No clubbing or cyanosis  NEUROLOGIC:  No motor or sensory deficit.  PSYCH: Alert & oriented x 3, normal affect    Consultant(s) Notes Reviewed:  [x ] YES  [ ] NO  Care Discussed with Consultants/Other Providers [ x] YES  [ ] NO    LABS:                        9.5    6.06  )-----------( 385      ( 29 Sep 2019 04:43 )             30.3     09-29    142  |  106  |  47<H>  ----------------------------<  90  4.1   |  21  |  1.1    Ca    9.5      29 Sep 2019 04:43  Phos  2.6     09-29  Mg     2.1     09-29    TPro  6.3  /  Alb  3.2<L>  /  TBili  0.3  /  DBili  x   /  AST  16  /  ALT  22  /  AlkPhos  142<H>  09-29    RADIOLOGY & ADDITIONAL TESTS:    Medications:  Standing  ALPRAZolam 0.5 milliGRAM(s) Oral two times a day  amiodarone    Tablet 200 milliGRAM(s) Oral two times a day  aspirin  chewable 81 milliGRAM(s) Oral daily  atorvastatin 80 milliGRAM(s) Oral daily  cefepime   IVPB      cefepime   IVPB 1000 milliGRAM(s) IV Intermittent every 8 hours  chlorhexidine 4% Liquid 1 Application(s) Topical <User Schedule>  clopidogrel Tablet 75 milliGRAM(s) Oral daily  dextrose 5%. 1000 milliLiter(s) IV Continuous <Continuous>  dextrose 50% Injectable 12.5 Gram(s) IV Push once  dextrose 50% Injectable 25 Gram(s) IV Push once  dextrose 50% Injectable 25 Gram(s) IV Push once  digoxin     Tablet 0.125 milliGRAM(s) Oral daily  enoxaparin Injectable 60 milliGRAM(s) SubCutaneous every 12 hours  furosemide Infusion 8 mG/Hr IV Continuous <Continuous>  insulin glargine Injectable (LANTUS) 15 Unit(s) SubCutaneous at bedtime  insulin lispro (HumaLOG) corrective regimen sliding scale   SubCutaneous three times a day before meals  insulin lispro Injectable (HumaLOG) 5 Unit(s) SubCutaneous three times a day before meals  lidocaine   Patch 2 Patch Transdermal daily  nitroglycerin  Infusion 20 MICROgram(s)/Min IV Continuous <Continuous>  ondansetron Injectable 4 milliGRAM(s) IV Push once  pantoprazole    Tablet 40 milliGRAM(s) Oral before breakfast  spironolactone 25 milliGRAM(s) Oral daily  vancomycin    Solution 125 milliGRAM(s) Oral every 6 hours    PRN Meds  dextrose 40% Gel 15 Gram(s) Oral once PRN  glucagon  Injectable 1 milliGRAM(s) IntraMuscular once PRN      Mendy Shi MD  s. [id number]

## 2019-09-29 NOTE — CHART NOTE - NSCHARTNOTEFT_GEN_A_CORE
Spoke with Dr Gandara: BP 80's systolic MAP 60, DC lasix  Cheeta  volume responsive,  DC Nitro drip  and started Captopril. BP tolerating.

## 2019-09-30 LAB
ALBUMIN SERPL ELPH-MCNC: 3.4 G/DL — LOW (ref 3.5–5.2)
ALP SERPL-CCNC: 136 U/L — HIGH (ref 30–115)
ALT FLD-CCNC: 23 U/L — SIGNIFICANT CHANGE UP (ref 0–41)
ANION GAP SERPL CALC-SCNC: 16 MMOL/L — HIGH (ref 7–14)
AST SERPL-CCNC: 25 U/L — SIGNIFICANT CHANGE UP (ref 0–41)
BILIRUB SERPL-MCNC: 0.2 MG/DL — SIGNIFICANT CHANGE UP (ref 0.2–1.2)
BLD GP AB SCN SERPL QL: SIGNIFICANT CHANGE UP
BUN SERPL-MCNC: 42 MG/DL — HIGH (ref 10–20)
CALCIUM SERPL-MCNC: 9.9 MG/DL — SIGNIFICANT CHANGE UP (ref 8.5–10.1)
CHLORIDE SERPL-SCNC: 103 MMOL/L — SIGNIFICANT CHANGE UP (ref 98–110)
CO2 SERPL-SCNC: 25 MMOL/L — SIGNIFICANT CHANGE UP (ref 17–32)
CREAT SERPL-MCNC: 1.2 MG/DL — SIGNIFICANT CHANGE UP (ref 0.7–1.5)
GLUCOSE BLDC GLUCOMTR-MCNC: 110 MG/DL — HIGH (ref 70–99)
GLUCOSE BLDC GLUCOMTR-MCNC: 120 MG/DL — HIGH (ref 70–99)
GLUCOSE BLDC GLUCOMTR-MCNC: 169 MG/DL — HIGH (ref 70–99)
GLUCOSE SERPL-MCNC: 98 MG/DL — SIGNIFICANT CHANGE UP (ref 70–99)
HCT VFR BLD CALC: 31.8 % — LOW (ref 37–47)
HGB BLD-MCNC: 9.7 G/DL — LOW (ref 12–16)
MAGNESIUM SERPL-MCNC: 2 MG/DL — SIGNIFICANT CHANGE UP (ref 1.8–2.4)
MCHC RBC-ENTMCNC: 24.9 PG — LOW (ref 27–31)
MCHC RBC-ENTMCNC: 30.5 G/DL — LOW (ref 32–37)
MCV RBC AUTO: 81.7 FL — SIGNIFICANT CHANGE UP (ref 81–99)
NRBC # BLD: 0 /100 WBCS — SIGNIFICANT CHANGE UP (ref 0–0)
PHOSPHATE SERPL-MCNC: 2.2 MG/DL — SIGNIFICANT CHANGE UP (ref 2.1–4.9)
PLATELET # BLD AUTO: 428 K/UL — HIGH (ref 130–400)
POTASSIUM SERPL-MCNC: 3.9 MMOL/L — SIGNIFICANT CHANGE UP (ref 3.5–5)
POTASSIUM SERPL-SCNC: 3.9 MMOL/L — SIGNIFICANT CHANGE UP (ref 3.5–5)
PROT SERPL-MCNC: 6.6 G/DL — SIGNIFICANT CHANGE UP (ref 6–8)
RBC # BLD: 3.89 M/UL — LOW (ref 4.2–5.4)
RBC # FLD: 14.4 % — SIGNIFICANT CHANGE UP (ref 11.5–14.5)
SODIUM SERPL-SCNC: 144 MMOL/L — SIGNIFICANT CHANGE UP (ref 135–146)
WBC # BLD: 7.64 K/UL — SIGNIFICANT CHANGE UP (ref 4.8–10.8)
WBC # FLD AUTO: 7.64 K/UL — SIGNIFICANT CHANGE UP (ref 4.8–10.8)

## 2019-09-30 PROCEDURE — 99233 SBSQ HOSP IP/OBS HIGH 50: CPT

## 2019-09-30 PROCEDURE — 71045 X-RAY EXAM CHEST 1 VIEW: CPT | Mod: 26

## 2019-09-30 PROCEDURE — 99232 SBSQ HOSP IP/OBS MODERATE 35: CPT

## 2019-09-30 RX ORDER — METOPROLOL TARTRATE 50 MG
12.5 TABLET ORAL ONCE
Refills: 0 | Status: COMPLETED | OUTPATIENT
Start: 2019-09-30 | End: 2019-09-30

## 2019-09-30 RX ORDER — METOPROLOL TARTRATE 50 MG
12.5 TABLET ORAL EVERY 6 HOURS
Refills: 0 | Status: DISCONTINUED | OUTPATIENT
Start: 2019-09-30 | End: 2019-10-02

## 2019-09-30 RX ORDER — METOPROLOL TARTRATE 50 MG
12.5 TABLET ORAL
Refills: 0 | Status: DISCONTINUED | OUTPATIENT
Start: 2019-09-30 | End: 2019-09-30

## 2019-09-30 RX ORDER — ALPRAZOLAM 0.25 MG
0.5 TABLET ORAL
Refills: 0 | Status: DISCONTINUED | OUTPATIENT
Start: 2019-09-30 | End: 2019-10-07

## 2019-09-30 RX ORDER — ALPRAZOLAM 0.25 MG
0.5 TABLET ORAL ONCE
Refills: 0 | Status: DISCONTINUED | OUTPATIENT
Start: 2019-09-30 | End: 2019-09-30

## 2019-09-30 RX ORDER — INSULIN GLARGINE 100 [IU]/ML
7 INJECTION, SOLUTION SUBCUTANEOUS ONCE
Refills: 0 | Status: COMPLETED | OUTPATIENT
Start: 2019-09-30 | End: 2019-09-30

## 2019-09-30 RX ADMIN — Medication 5 UNIT(S): at 08:26

## 2019-09-30 RX ADMIN — CAPTOPRIL 6.25 MILLIGRAM(S): 12.5 TABLET ORAL at 14:45

## 2019-09-30 RX ADMIN — LIDOCAINE 2 PATCH: 4 CREAM TOPICAL at 22:52

## 2019-09-30 RX ADMIN — SPIRONOLACTONE 25 MILLIGRAM(S): 25 TABLET, FILM COATED ORAL at 05:21

## 2019-09-30 RX ADMIN — LIDOCAINE 1 PATCH: 4 CREAM TOPICAL at 11:47

## 2019-09-30 RX ADMIN — Medication 1: at 17:56

## 2019-09-30 RX ADMIN — Medication 12.5 MILLIGRAM(S): at 17:51

## 2019-09-30 RX ADMIN — AMIODARONE HYDROCHLORIDE 200 MILLIGRAM(S): 400 TABLET ORAL at 17:51

## 2019-09-30 RX ADMIN — Medication 0.12 MILLIGRAM(S): at 22:20

## 2019-09-30 RX ADMIN — Medication 125 MILLIGRAM(S): at 05:21

## 2019-09-30 RX ADMIN — CAPTOPRIL 6.25 MILLIGRAM(S): 12.5 TABLET ORAL at 22:20

## 2019-09-30 RX ADMIN — AMIODARONE HYDROCHLORIDE 200 MILLIGRAM(S): 400 TABLET ORAL at 05:21

## 2019-09-30 RX ADMIN — Medication 125 MILLIGRAM(S): at 17:51

## 2019-09-30 RX ADMIN — CLOPIDOGREL BISULFATE 75 MILLIGRAM(S): 75 TABLET, FILM COATED ORAL at 11:42

## 2019-09-30 RX ADMIN — ATORVASTATIN CALCIUM 80 MILLIGRAM(S): 80 TABLET, FILM COATED ORAL at 22:20

## 2019-09-30 RX ADMIN — ENOXAPARIN SODIUM 60 MILLIGRAM(S): 100 INJECTION SUBCUTANEOUS at 05:22

## 2019-09-30 RX ADMIN — Medication 81 MILLIGRAM(S): at 11:42

## 2019-09-30 RX ADMIN — Medication 125 MILLIGRAM(S): at 11:47

## 2019-09-30 RX ADMIN — Medication 0.5 MILLIGRAM(S): at 23:54

## 2019-09-30 RX ADMIN — Medication 12.5 MILLIGRAM(S): at 23:54

## 2019-09-30 RX ADMIN — CHLORHEXIDINE GLUCONATE 1 APPLICATION(S): 213 SOLUTION TOPICAL at 05:21

## 2019-09-30 RX ADMIN — ENOXAPARIN SODIUM 60 MILLIGRAM(S): 100 INJECTION SUBCUTANEOUS at 17:52

## 2019-09-30 RX ADMIN — Medication 5 UNIT(S): at 12:18

## 2019-09-30 RX ADMIN — Medication 12.5 MILLIGRAM(S): at 11:42

## 2019-09-30 RX ADMIN — Medication 125 MILLIGRAM(S): at 23:54

## 2019-09-30 RX ADMIN — INSULIN GLARGINE 7 UNIT(S): 100 INJECTION, SOLUTION SUBCUTANEOUS at 23:55

## 2019-09-30 RX ADMIN — CEFEPIME 100 MILLIGRAM(S): 1 INJECTION, POWDER, FOR SOLUTION INTRAMUSCULAR; INTRAVENOUS at 05:20

## 2019-09-30 RX ADMIN — CAPTOPRIL 6.25 MILLIGRAM(S): 12.5 TABLET ORAL at 05:22

## 2019-09-30 RX ADMIN — Medication 5 UNIT(S): at 17:56

## 2019-09-30 RX ADMIN — CEFEPIME 100 MILLIGRAM(S): 1 INJECTION, POWDER, FOR SOLUTION INTRAMUSCULAR; INTRAVENOUS at 14:43

## 2019-09-30 RX ADMIN — PANTOPRAZOLE SODIUM 40 MILLIGRAM(S): 20 TABLET, DELAYED RELEASE ORAL at 08:25

## 2019-09-30 RX ADMIN — CEFEPIME 100 MILLIGRAM(S): 1 INJECTION, POWDER, FOR SOLUTION INTRAMUSCULAR; INTRAVENOUS at 22:52

## 2019-09-30 NOTE — PROGRESS NOTE ADULT - ASSESSMENT
67 y/o F with h/o DM admitted with c/o acute onset of SOB associated with nausea, back pain and weakness. Patient with positive troponin on admission and LVEF of ~ 15%. 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 Volume status improved. Tolerated HF meds.

## 2019-09-30 NOTE — CHART NOTE - NSCHARTNOTEFT_GEN_A_CORE
Registered Dietitian Follow-Up     Patient Profile Reviewed                           Yes [x]   No []     Nutrition History Previously Obtained        Yes [x]  No []       Pertinent Subjective Information:     Pertinent Medical Interventions: Acute hypoxic respiratory failure like due to Acute systolic and diastolic heart failure s/p extubation- on nasal cannula. NSTEMI/3V CAD- cardiology and CT surgery on board. CDIFF + on Vanco. Diabetic ketoacidosis - resolved. PAF, currently in sinus. Uterine Prolapse with chronic montejo- outpatient follow up.       Diet order: dysphagia 3 soft thin liquid, consistent carb w/snack      Anthropometrics:  - Ht. 160cm  - Wt. 58.8kg on 9/29 vs. t. 58.7kg on 9/26- relatively stable   - %wt change  - BMI 23.0  - IBW      Pertinent Lab Data: (9/30) RBC 3.89, Hg 9.7, Hct 31.8, BUN 42, eGFR 47     Pertinent Meds: Glargine, Lispro, Metoprolol, Zofran, Protonix, Atorvastatin      Physical Findings:  - Appearance: alert&oriented, 2+ edema to R foot, leg   - GI function: no symptoms noted, last BM 9/29   - Tubes: none noted  - Oral/Mouth cavity: no symptoms noted  - Skin: ecchymosis (BS 15)        Nutrition Requirements (from RD note on 9/23)   Weight Used: 58.7kg- lowest doc weight      Estimated Energy Needs    Continue [x]  Adjust [] 1315-1425kcal (MSJ x 1.2-1.3 AF)  Adjusted Energy Recommendations:   kcal/day        Estimated Protein Needs    Continue [x]  Adjust [] 59-71g (1-1.2g/kg CBW  Adjusted Protein Recommendations:   gm/day        Estimated Fluid Needs        Continue [x]  Adjust [] per CCU team   Adjusted Fluid Recommendations:   mL/day     Nutrient Intake:        [] Previous Nutrition Diagnosis:  Inadequate protein energy intake             [] Ongoing          [] Resolved    [] No active nutrition diagnosis identified at this time        Nutrition Intervention: meals and snacks, medical food supplement    Rec:  Continue dysphagia 3 mech soft thin liquid, consistent carb w/snack and add Glucerna BID, Prosource gelatein SF daily     Goal/Expected Outcome:     Indicator/Monitoring: energy intake, body composition, NFPF, electrolyte profile, glucose profile Registered Dietitian Follow-Up     Patient Profile Reviewed                           Yes [x]   No []     Nutrition History Previously Obtained        Yes [x]  No []       Pertinent Subjective Information: pt. resting in bed during visit- reports feeling much better. PO intake increased. Recommended supplements have not been ordered, however pt. tried samples provided by RD. Pt. does not like protein jello, but willing to drink Glucerna shake (chocolate or vanilla only)      Pertinent Medical Interventions: Acute hypoxic respiratory failure like due to Acute systolic and diastolic heart failure s/p extubation- on nasal cannula. NSTEMI/3V CAD- cardiology and CT surgery on board. CDIFF + on Vanco. Diabetic ketoacidosis - resolved. PAF, currently in sinus. Uterine Prolapse with chronic montejo- outpatient follow up.       Diet order: dysphagia 3 soft thin liquid, consistent carb w/snack      Anthropometrics:  - Ht. 160cm  - Wt. 58.8kg on 9/29 vs. t. 58.7kg on 9/26- relatively stable   - %wt change  - BMI 23.0  - IBW      Pertinent Lab Data: (9/30) RBC 3.89, Hg 9.7, Hct 31.8, BUN 42, eGFR 47     Pertinent Meds: Glargine, Lispro, Metoprolol, Zofran, Protonix, Atorvastatin      Physical Findings:  - Appearance: alert&oriented, 2+ edema to R foot, leg   - GI function: no symptoms noted, last BM 9/29   - Tubes: none noted  - Oral/Mouth cavity: no symptoms noted  - Skin: ecchymosis (BS 15)        Nutrition Requirements (from RD note on 9/23)   Weight Used: 58.7kg- lowest doc weight      Estimated Energy Needs    Continue [x]  Adjust [] 1315-1425kcal (MSJ x 1.2-1.3 AF)  Adjusted Energy Recommendations:   kcal/day        Estimated Protein Needs    Continue [x]  Adjust [] 59-71g (1-1.2g/kg CBW  Adjusted Protein Recommendations:   gm/day        Estimated Fluid Needs        Continue [x]  Adjust [] per CCU team   Adjusted Fluid Recommendations:   mL/day     Nutrient Intake: ~50-75% PO at meals         [] Previous Nutrition Diagnosis:  Inadequate protein energy intake- improving            [] Ongoing          [] Resolved          Nutrition Intervention: meals and snacks, medical food supplement    Rec:  Continue dysphagia 3 mech soft thin liquid, consistent carb w/snack and add Glucerna daily     Goal/Expected Outcome: In 3 days pt. to consistently consume at least 75% PO and supplement     Indicator/Monitoring: energy intake, body composition, NFPF, electrolyte profile, glucose profile

## 2019-09-30 NOTE — PROGRESS NOTE ADULT - ATTENDING COMMENTS
Patient is seen and examined independently at bedside. I agree with the resident's note, history and plan as above. Corrections made where appropriate    All labs, radiologic studies, vitals, orders and medications list reviewed.     #Progress Note Handoff  Pending (specify):  PCI of LM with LV assist tomorrow  Family discussion: Plan of care discussed with patient, aware and agreeable   Disposition: unclear at this time    patient requires continuous monitoring in CCU

## 2019-09-30 NOTE — PROGRESS NOTE ADULT - SUBJECTIVE AND OBJECTIVE BOX
GUS WILBURN 68y Female  MRN#: [id number]   Hospital Day: 9d    SUBJECTIVE  Patient is a 68y old Female who presents with a chief complaint of HErEF (30 Sep 2019 09:31)  Currently admitted to medicine with the primary diagnosis of Chest pain    INTERVAL HPI AND OVERNIGHT EVENTS:  Patient was examined and seen at bedside. This morning she is resting comfortably in bed and reports no issues or overnight events. Patient is significantly improved compared to last being seen on 9/28/2019.     REVIEW OF SYMPTOMS:  CONSTITUTIONAL: No weakness, fevers or chills; No headaches  EYES: No visual changes, eye pain, or discharge  ENT: No vertigo; No ear pain or change in hearing; No sore throat or difficulty swallowing  NECK: No pain or stiffness  RESPIRATORY: No cough, wheezing, or hemoptysis; No shortness of breath  CARDIOVASCULAR: No chest pain or palpitations  GASTROINTESTINAL: No abdominal or epigastric pain; No nausea, vomiting, or hematemesis; No diarrhea or constipation; No melena or hematochezia  GENITOURINARY: No dysuria, frequency or hematuria  MUSCULOSKELETAL: (+) mild back pain, no weakness  NEUROLOGICAL: No numbness or weakness  SKIN: No itching or rashes      OBJECTIVE  PAST MEDICAL & SURGICAL HISTORY  Female bladder prolapse  Diabetes  History of repair of hip fracture    ALLERGIES:  No Known Allergies    MEDICATIONS:  STANDING MEDICATIONS  ALPRAZolam 0.5 milliGRAM(s) Oral two times a day  amiodarone    Tablet 200 milliGRAM(s) Oral two times a day  aspirin  chewable 81 milliGRAM(s) Oral daily  atorvastatin 80 milliGRAM(s) Oral daily  captopril 6.25 milliGRAM(s) Oral three times a day  cefepime   IVPB      cefepime   IVPB 1000 milliGRAM(s) IV Intermittent every 8 hours  chlorhexidine 4% Liquid 1 Application(s) Topical <User Schedule>  clopidogrel Tablet 75 milliGRAM(s) Oral daily  dextrose 5%. 1000 milliLiter(s) IV Continuous <Continuous>  dextrose 50% Injectable 12.5 Gram(s) IV Push once  dextrose 50% Injectable 25 Gram(s) IV Push once  dextrose 50% Injectable 25 Gram(s) IV Push once  digoxin     Tablet 0.125 milliGRAM(s) Oral daily  enoxaparin Injectable 60 milliGRAM(s) SubCutaneous every 12 hours  insulin glargine Injectable (LANTUS) 15 Unit(s) SubCutaneous at bedtime  insulin lispro (HumaLOG) corrective regimen sliding scale   SubCutaneous three times a day before meals  insulin lispro Injectable (HumaLOG) 5 Unit(s) SubCutaneous three times a day before meals  lidocaine   Patch 2 Patch Transdermal daily  metoprolol tartrate 12.5 milliGRAM(s) Oral two times a day  ondansetron Injectable 4 milliGRAM(s) IV Push once  pantoprazole    Tablet 40 milliGRAM(s) Oral before breakfast  spironolactone 25 milliGRAM(s) Oral daily  vancomycin    Solution 125 milliGRAM(s) Oral every 6 hours    PRN MEDICATIONS  dextrose 40% Gel 15 Gram(s) Oral once PRN  glucagon  Injectable 1 milliGRAM(s) IntraMuscular once PRN      VITAL SIGNS: Last 24 Hours  T(C): 36.1 (30 Sep 2019 12:00), Max: 36.3 (29 Sep 2019 18:00)  T(F): 96.9 (30 Sep 2019 12:00), Max: 97.4 (29 Sep 2019 18:00)  HR: 82 (30 Sep 2019 12:00) (76 - 88)  BP: 101/60 (30 Sep 2019 12:00) (90/50 - 112/63)  BP(mean): 72 (30 Sep 2019 12:00) (61 - 86)  RR: 21 (30 Sep 2019 12:00) (18 - 32)  SpO2: 99% (30 Sep 2019 12:00) (95% - 99%)    LABS:                        9.7    7.64  )-----------( 428      ( 30 Sep 2019 05:07 )             31.8     09-30    144  |  103  |  42<H>  ----------------------------<  98  3.9   |  25  |  1.2    Ca    9.9      30 Sep 2019 05:07  Phos  2.2     09-30  Mg     2.0     09-30    TPro  6.6  /  Alb  3.4<L>  /  TBili  0.2  /  DBili  x   /  AST  25  /  ALT  23  /  AlkPhos  136<H>  09-30              Culture - Urine (collected 28 Sep 2019 10:39)  Source: .Urine Catheterized  Final Report (29 Sep 2019 14:30):    <10,000 CFU/mL Normal Urogenital Ewa          RADIOLOGY:  < from: Xray Chest 1 View- PORTABLE-Routine (09.30.19 @ 05:41) >  INTERPRETATION:  Clinical History / Reason for exam: Congestive heart   failure.    Comparison : Chest radiograph September 29, 2019.    Technique/Positioning: Single frontal chest x-ray obtained..    Findings:    Support devices: None.    Cardiac/mediastinum/hilum: Unchanged.    Lung parenchyma/Pleura: Stable bilateral pleural effusion/opacities.    Skeleton/soft tissues: Unchanged.    Impression:      Stablebilateral pleural effusion/opacities.    < end of copied text >      PHYSICAL EXAM:  CONSTITUTIONAL: No acute distress, well-developed, well-groomed, AAOx3  HEAD: Atraumatic, normocephalic  EYES: EOM intact, PERRLA, conjunctiva and sclera clear  ENT: Supple, no masses, no thyromegaly, no bruits, no JVD; moist mucous membranes  PULMONARY: Clear to auscultation bilaterally; no wheezes, rales, or rhonchi  CARDIOVASCULAR: Regular rate and rhythm; no murmurs, rubs, or gallops  GASTROINTESTINAL: Soft, non-tender, non-distended; bowel sounds present  MUSCULOSKELETAL: 2+ peripheral pulses; no clubbing, no cyanosis, no edema  NEUROLOGY: non-focal  SKIN: No rashes or lesions; warm and dry    ASSESSMENT & PLAN  Patient is a 67yo female with PMHx of diabetes mellitus, paroxysmal atrial fibrillation, history of lower GI bleed and recently admitted for hip fracture who presented with acute hypoxic respiratory failure secondary to acute on chronic HFrEF s/p intubation. Found to have NSTEMI.    #Acute hypoxic respiratory failure s/p extubation, resolved  - Patient saturating well on room air  - Chest X-ray shows stable bilateral pleural effusions  - Repeat chest X-ray in AM    #NSTEMI s/p cath 9/27/2019  - Cardiology consult appreciated  - CT Surgery consult appreciated  - Heart failure consult appreciated  - Continue with atorvastatin 80mg PO QD  - Continue with aspirin 81mg PO QD  - Continue with clopidogrel 75mg PO QD  - Start and continue metoprolol 12.5mg PO Q6H  - Hold Lovenox in AM prior to PCI  - Hold captopril in AM prior to PCI  - Per Dr. Mancia, patient will receive 1 unit PRBC prior to PCI to maintain hemoglobin >10  - Monitor inputs and outputs, renal function, and body weights  - Keep Mg>2.2 and K>4.4    #Acute on chronic HFrEF exacerbation, resolving  - Cardiology consult appreciated  - Heart failure consult appreciated  - Continue with atorvastatin 80mg PO QD  - Continue with aspirin 81mg PO QD  - Continue with clopidogrel 75mg PO QD  - Continue with spironolactone 25mg PO QD  - Start and continue metoprolol 12.5mg PO Q6H  - Furosemide gtt and nitroglycerin gtt were discontinued on 9/29/2019  - Continue with captopril 6.25mg PO TID  - Hold captopril in AM prior to PCI  - Monitor inputs and outputs, renal function, and body weights  - Keep Mg>2.2 and K>4.4  - Patient may require Lifevest prior to discharge based on cath results  - Educated patient and family on cardiac rehab s/p cath    #Low back pain, improving  - Continue with lidocaine patches PRN    #C. diff enterocolitis, resolving  - Patient endorses only one bowel movement overnight  - C. diff PCR positive on 9/25/2019  - Continue with vancomycin 125mg PO Q6H for 10 days (Day #6)    #Diabetic ketoacidosis, resolved  - Sugars are well controlled  - Continue with insulin glargine 15 units SQ QHS  - Continue with insulin lispro 5 units SQ TID with meals  - Continue with insulin sliding scale  - Monitor finger stick glucose and BMP    #Borderline sinus tachycardia, resolved  - Hemoglobin and hematocrit are stable  - [facility] Duplex: no evidence of DVT bilaterally  - Hold Lovenox for PCI tomorrow  - Will discuss need for anticoagulation after PCI    #Paroxysmal atrial fibrillation with RVR, resolved  - Patient is in normal sinus rhythm  - Cardiology consult appreciated  - Continue with amiodarone 200mg PO BID  - Continue with digoxin 0.125mg PO QD  - Start and continue metoprolol 12.5mg PO Q6H  - Follow digoxin level in AM    #Possible urinary tract infection, resolving  - Patient has indwelling Castellon catheter  - Urine culture from 9/22/2019: no growth to date  - Continue with cefepime 1g IV Q8H (Day #7)  - Will receive a total of 10 days of treatment  - Urogynecology consult appreciated  - Castellon catheter replaced on 9/28/2019  - Urine culture 9/28/2019: normal ewa    #Transaminitis, resolved  - Likely secondary to congestive hepatopathy  - No RUQ pain on exam  - LFTs within normal limits for past 2 days  - Monitor LFTs    #Anxiety  - Continue with Xanax 0.5mg PO Q12H    #Misc  - DVT Prophylaxis: Lovenox 60mg SQ Q12H, will hold morning dose prior to PCI  - Diet: Dysphagia 3 diet with thin liquids, DASH/TLC, consistent carbohydrates with evening snack afterwards; NPO after midnight  - GI Prophylaxis: Pantoprazole 40mg PO QD  - Activity: Increase as tolerated  - IV Fluids: Encourage PO intake  - Code Status: DNR/DNI    Dispo: From home GUS WILBURN 68y Female  MRN#: [id number]   Hospital Day: 9d    SUBJECTIVE  Patient is a 68y old Female who presents with a chief complaint of HErEF (30 Sep 2019 09:31)  Currently admitted to medicine with the primary diagnosis of Chest pain    INTERVAL HPI AND OVERNIGHT EVENTS:  Patient was examined and seen at bedside. This morning she is resting comfortably in bed and reports no issues or overnight events. Patient is significantly improved compared to last being seen on 9/28/2019.     REVIEW OF SYMPTOMS:  CONSTITUTIONAL: No weakness, fevers or chills; No headaches  EYES: No visual changes, eye pain, or discharge  ENT: No vertigo; No ear pain or change in hearing; No sore throat or difficulty swallowing  NECK: No pain or stiffness  RESPIRATORY: No cough, wheezing, or hemoptysis; No shortness of breath  CARDIOVASCULAR: No chest pain or palpitations  GASTROINTESTINAL: No abdominal or epigastric pain; No nausea, vomiting, or hematemesis; No diarrhea or constipation; No melena or hematochezia  GENITOURINARY: No dysuria, frequency or hematuria  MUSCULOSKELETAL: (+) mild back pain, no weakness  NEUROLOGICAL: No numbness or weakness  SKIN: No itching or rashes      OBJECTIVE  PAST MEDICAL & SURGICAL HISTORY  Female bladder prolapse  Diabetes  History of repair of hip fracture    ALLERGIES:  No Known Allergies    MEDICATIONS:  STANDING MEDICATIONS  ALPRAZolam 0.5 milliGRAM(s) Oral two times a day  amiodarone    Tablet 200 milliGRAM(s) Oral two times a day  aspirin  chewable 81 milliGRAM(s) Oral daily  atorvastatin 80 milliGRAM(s) Oral daily  captopril 6.25 milliGRAM(s) Oral three times a day  cefepime   IVPB      cefepime   IVPB 1000 milliGRAM(s) IV Intermittent every 8 hours  chlorhexidine 4% Liquid 1 Application(s) Topical <User Schedule>  clopidogrel Tablet 75 milliGRAM(s) Oral daily  dextrose 5%. 1000 milliLiter(s) IV Continuous <Continuous>  dextrose 50% Injectable 12.5 Gram(s) IV Push once  dextrose 50% Injectable 25 Gram(s) IV Push once  dextrose 50% Injectable 25 Gram(s) IV Push once  digoxin     Tablet 0.125 milliGRAM(s) Oral daily  enoxaparin Injectable 60 milliGRAM(s) SubCutaneous every 12 hours  insulin glargine Injectable (LANTUS) 15 Unit(s) SubCutaneous at bedtime  insulin lispro (HumaLOG) corrective regimen sliding scale   SubCutaneous three times a day before meals  insulin lispro Injectable (HumaLOG) 5 Unit(s) SubCutaneous three times a day before meals  lidocaine   Patch 2 Patch Transdermal daily  metoprolol tartrate 12.5 milliGRAM(s) Oral two times a day  ondansetron Injectable 4 milliGRAM(s) IV Push once  pantoprazole    Tablet 40 milliGRAM(s) Oral before breakfast  spironolactone 25 milliGRAM(s) Oral daily  vancomycin    Solution 125 milliGRAM(s) Oral every 6 hours    PRN MEDICATIONS  dextrose 40% Gel 15 Gram(s) Oral once PRN  glucagon  Injectable 1 milliGRAM(s) IntraMuscular once PRN      VITAL SIGNS: Last 24 Hours  T(C): 36.1 (30 Sep 2019 12:00), Max: 36.3 (29 Sep 2019 18:00)  T(F): 96.9 (30 Sep 2019 12:00), Max: 97.4 (29 Sep 2019 18:00)  HR: 82 (30 Sep 2019 12:00) (76 - 88)  BP: 101/60 (30 Sep 2019 12:00) (90/50 - 112/63)  BP(mean): 72 (30 Sep 2019 12:00) (61 - 86)  RR: 21 (30 Sep 2019 12:00) (18 - 32)  SpO2: 99% (30 Sep 2019 12:00) (95% - 99%)    LABS:                        9.7    7.64  )-----------( 428      ( 30 Sep 2019 05:07 )             31.8     09-30    144  |  103  |  42<H>  ----------------------------<  98  3.9   |  25  |  1.2    Ca    9.9      30 Sep 2019 05:07  Phos  2.2     09-30  Mg     2.0     09-30    TPro  6.6  /  Alb  3.4<L>  /  TBili  0.2  /  DBili  x   /  AST  25  /  ALT  23  /  AlkPhos  136<H>  09-30              Culture - Urine (collected 28 Sep 2019 10:39)  Source: .Urine Catheterized  Final Report (29 Sep 2019 14:30):    <10,000 CFU/mL Normal Urogenital Ewa          RADIOLOGY:  < from: Xray Chest 1 View- PORTABLE-Routine (09.30.19 @ 05:41) >  INTERPRETATION:  Clinical History / Reason for exam: Congestive heart   failure.    Comparison : Chest radiograph September 29, 2019.    Technique/Positioning: Single frontal chest x-ray obtained..    Findings:    Support devices: None.    Cardiac/mediastinum/hilum: Unchanged.    Lung parenchyma/Pleura: Stable bilateral pleural effusion/opacities.    Skeleton/soft tissues: Unchanged.    Impression:      Stablebilateral pleural effusion/opacities.    < end of copied text >      PHYSICAL EXAM:  CONSTITUTIONAL: No acute distress, well-developed, well-groomed, AAOx3  HEAD: Atraumatic, normocephalic  EYES: EOM intact, PERRLA, conjunctiva and sclera clear  ENT: Supple, no masses, no thyromegaly, no bruits, no JVD; moist mucous membranes  PULMONARY: Clear to auscultation bilaterally; no wheezes, rales, or rhonchi  CARDIOVASCULAR: Regular rate and rhythm; no murmurs, rubs, or gallops  GASTROINTESTINAL: Soft, non-tender, non-distended; bowel sounds present  MUSCULOSKELETAL: 2+ peripheral pulses; no clubbing, no cyanosis, no edema  NEUROLOGY: non-focal  SKIN: No rashes or lesions; warm and dry    ASSESSMENT & PLAN  Patient is a 69yo female with PMHx of diabetes mellitus, paroxysmal atrial fibrillation, history of lower GI bleed and recently admitted for hip fracture who presented with acute hypoxic respiratory failure secondary to acute on chronic HFrEF s/p intubation. Found to have NSTEMI.    #Acute hypoxic respiratory failure s/p extubation, resolved  - Patient saturating well on room air  - Chest X-ray shows stable bilateral pleural effusions  - Repeat chest X-ray in AM    #NSTEMI s/p cath 9/27/2019  - Cardiology consult appreciated  - CT Surgery consult appreciated  - Heart failure consult appreciated  - Continue with atorvastatin 80mg PO QD  - Continue with aspirin 81mg PO QD  - Continue with clopidogrel 75mg PO QD  - Start and continue metoprolol 12.5mg PO Q6H  - Hold Lovenox in AM prior to PCI  - Hold captopril in AM prior to PCI  - Per Dr. Mancia, patient will receive 1 unit PRBC prior to PCI to maintain hemoglobin >10  - Per Dr. Gandara, will consider IV iron if patient is still anemic after PCI  - Monitor inputs and outputs, renal function, and body weights  - Keep Mg>2.2 and K>4.4    #Acute on chronic HFrEF exacerbation, resolving  - Cardiology consult appreciated  - Heart failure consult appreciated  - Continue with atorvastatin 80mg PO QD  - Continue with aspirin 81mg PO QD  - Continue with clopidogrel 75mg PO QD  - Continue with spironolactone 25mg PO QD  - Start and continue metoprolol 12.5mg PO Q6H  - Furosemide gtt and nitroglycerin gtt were discontinued on 9/29/2019  - Continue with captopril 6.25mg PO TID  - Hold captopril in AM prior to PCI  - Monitor inputs and outputs, renal function, and body weights  - Keep Mg>2.2 and K>4.4  - Patient may require Lifevest prior to discharge based on cath results  - Educated patient and family on cardiac rehab s/p cath    #Low back pain, improving  - Continue with lidocaine patches PRN    #C. diff enterocolitis, resolving  - Patient endorses only one bowel movement overnight  - C. diff PCR positive on 9/25/2019  - Continue with vancomycin 125mg PO Q6H for 10 days (Day #6)    #Diabetic ketoacidosis, resolved  - Sugars are well controlled  - Continue with insulin glargine 15 units SQ QHS  - Continue with insulin lispro 5 units SQ TID with meals  - Continue with insulin sliding scale  - Monitor finger stick glucose and BMP    #Borderline sinus tachycardia, resolved  - Hemoglobin and hematocrit are stable  - [facility] Duplex: no evidence of DVT bilaterally  - Hold Lovenox for PCI tomorrow  - Will discuss need for anticoagulation after PCI    #Paroxysmal atrial fibrillation with RVR, resolved  - Patient is in normal sinus rhythm  - Cardiology consult appreciated  - Continue with amiodarone 200mg PO BID  - Continue with digoxin 0.125mg PO QD  - Start and continue metoprolol 12.5mg PO Q6H  - Follow digoxin level in AM    #Possible urinary tract infection, resolving  - Patient has indwelling Castellon catheter  - Urine culture from 9/22/2019: no growth to date  - Continue with cefepime 1g IV Q8H (Day #7)  - Will receive a total of 10 days of treatment  - Urogynecology consult appreciated  - Castellon catheter replaced on 9/28/2019  - Urine culture 9/28/2019: normal ewa    #Transaminitis, resolved  - Likely secondary to congestive hepatopathy  - No RUQ pain on exam  - LFTs within normal limits for past 2 days  - Monitor LFTs    #Anxiety  - Continue with Xanax 0.5mg PO Q12H    #Misc  - DVT Prophylaxis: Lovenox 60mg SQ Q12H, will hold morning dose prior to PCI  - Diet: Dysphagia 3 diet with thin liquids, DASH/TLC, consistent carbohydrates with evening snack afterwards; NPO after midnight  - GI Prophylaxis: Pantoprazole 40mg PO QD  - Activity: Increase as tolerated  - IV Fluids: Encourage PO intake  - Code Status: DNR/DNI    Dispo: From home GUS WILBURN 68y Female  MRN#: [id number]   Hospital Day: 9d    SUBJECTIVE  Patient is a 68y old Female who presents with a chief complaint of HErEF (30 Sep 2019 09:31)  Currently admitted to medicine with the primary diagnosis of Chest pain    INTERVAL HPI AND OVERNIGHT EVENTS:  Patient was examined and seen at bedside. This morning she is resting comfortably in bed and reports no issues or overnight events. Patient is significantly improved with an improved appetite.     REVIEW OF SYMPTOMS:  CONSTITUTIONAL: No weakness, fevers or chills; No headaches  EYES: No visual changes, eye pain, or discharge  ENT: No vertigo; No ear pain or change in hearing; No sore throat or difficulty swallowing  NECK: No pain or stiffness  RESPIRATORY: No cough, wheezing, or hemoptysis; No shortness of breath  CARDIOVASCULAR: No chest pain or palpitations  GASTROINTESTINAL: No abdominal or epigastric pain; No nausea, vomiting, or hematemesis; No diarrhea or constipation; No melena or hematochezia  GENITOURINARY: No dysuria, frequency or hematuria  MUSCULOSKELETAL: (+) mild back pain, no weakness  NEUROLOGICAL: No numbness or weakness  SKIN: No itching or rashes    OBJECTIVE  PAST MEDICAL & SURGICAL HISTORY  Female bladder prolapse  Diabetes  History of repair of hip fracture    ALLERGIES:  No Known Allergies    MEDICATIONS:  STANDING MEDICATIONS  ALPRAZolam 0.5 milliGRAM(s) Oral two times a day  amiodarone    Tablet 200 milliGRAM(s) Oral two times a day  aspirin  chewable 81 milliGRAM(s) Oral daily  atorvastatin 80 milliGRAM(s) Oral daily  captopril 6.25 milliGRAM(s) Oral three times a day  cefepime   IVPB      cefepime   IVPB 1000 milliGRAM(s) IV Intermittent every 8 hours  chlorhexidine 4% Liquid 1 Application(s) Topical <User Schedule>  clopidogrel Tablet 75 milliGRAM(s) Oral daily  dextrose 5%. 1000 milliLiter(s) IV Continuous <Continuous>  dextrose 50% Injectable 12.5 Gram(s) IV Push once  dextrose 50% Injectable 25 Gram(s) IV Push once  dextrose 50% Injectable 25 Gram(s) IV Push once  digoxin     Tablet 0.125 milliGRAM(s) Oral daily  enoxaparin Injectable 60 milliGRAM(s) SubCutaneous every 12 hours  insulin glargine Injectable (LANTUS) 15 Unit(s) SubCutaneous at bedtime  insulin lispro (HumaLOG) corrective regimen sliding scale   SubCutaneous three times a day before meals  insulin lispro Injectable (HumaLOG) 5 Unit(s) SubCutaneous three times a day before meals  lidocaine   Patch 2 Patch Transdermal daily  metoprolol tartrate 12.5 milliGRAM(s) Oral two times a day  ondansetron Injectable 4 milliGRAM(s) IV Push once  pantoprazole    Tablet 40 milliGRAM(s) Oral before breakfast  spironolactone 25 milliGRAM(s) Oral daily  vancomycin    Solution 125 milliGRAM(s) Oral every 6 hours    PRN MEDICATIONS  dextrose 40% Gel 15 Gram(s) Oral once PRN  glucagon  Injectable 1 milliGRAM(s) IntraMuscular once PRN      VITAL SIGNS: Last 24 Hours  T(C): 36.1 (30 Sep 2019 12:00), Max: 36.3 (29 Sep 2019 18:00)  T(F): 96.9 (30 Sep 2019 12:00), Max: 97.4 (29 Sep 2019 18:00)  HR: 82 (30 Sep 2019 12:00) (76 - 88)  BP: 101/60 (30 Sep 2019 12:00) (90/50 - 112/63)  BP(mean): 72 (30 Sep 2019 12:00) (61 - 86)  RR: 21 (30 Sep 2019 12:00) (18 - 32)  SpO2: 99% (30 Sep 2019 12:00) (95% - 99%)    LABS:                        9.7    7.64  )-----------( 428      ( 30 Sep 2019 05:07 )             31.8     09-30    144  |  103  |  42<H>  ----------------------------<  98  3.9   |  25  |  1.2    Ca    9.9      30 Sep 2019 05:07  Phos  2.2     09-30  Mg     2.0     09-30    TPro  6.6  /  Alb  3.4<L>  /  TBili  0.2  /  DBili  x   /  AST  25  /  ALT  23  /  AlkPhos  136<H>  09-30    Culture - Urine (collected 28 Sep 2019 10:39)  Source: .Urine Catheterized  Final Report (29 Sep 2019 14:30):    <10,000 CFU/mL Normal Urogenital Ewa    RADIOLOGY:  < from: Xray Chest 1 View- PORTABLE-Routine (09.30.19 @ 05:41) >  INTERPRETATION:  Clinical History / Reason for exam: Congestive heart   failure.    Comparison : Chest radiograph September 29, 2019.    Technique/Positioning: Single frontal chest x-ray obtained..    Findings:    Support devices: None.    Cardiac/mediastinum/hilum: Unchanged.    Lung parenchyma/Pleura: Stable bilateral pleural effusion/opacities.    Skeleton/soft tissues: Unchanged.    Impression:      Stablebilateral pleural effusion/opacities.    < end of copied text >      PHYSICAL EXAM:  CONSTITUTIONAL: No acute distress, well-developed, well-groomed, AAOx3  HEAD: Atraumatic, normocephalic  EYES: EOM intact, PERRLA, conjunctiva and sclera clear  ENT: Supple, no masses, no thyromegaly, no bruits, no JVD; moist mucous membranes  PULMONARY: Clear to auscultation bilaterally; no wheezes, rales, or rhonchi  CARDIOVASCULAR: Regular rate and rhythm; no murmurs, rubs, or gallops  GASTROINTESTINAL: Soft, non-tender, non-distended; bowel sounds present  MUSCULOSKELETAL: 2+ peripheral pulses; no clubbing, no cyanosis, no edema  NEUROLOGY: non-focal  SKIN: No rashes or lesions; warm and dry    ASSESSMENT & PLAN  Patient is a 69yo female with PMHx of diabetes mellitus, paroxysmal atrial fibrillation, history of lower GI bleed and recently admitted for hip fracture who presented with acute hypoxic respiratory failure secondary to acute on chronic HFrEF s/p intubation. Found to have NSTEMI.    #Acute hypoxic respiratory failure s/p extubation, resolved  - Patient saturating well on room air  - Chest X-ray shows stable bilateral pleural effusions  - Repeat chest X-ray in AM    #NSTEMI s/p cath 9/27/2019  - Cardiology consult appreciated  - CT Surgery consult appreciated  - Heart failure consult appreciated  - Continue with atorvastatin 80mg PO QD  - Continue with aspirin 81mg PO QD  - Continue with clopidogrel 75mg PO QD  - Start and continue metoprolol 12.5mg PO Q6H  - Hold Lovenox in AM prior to PCI  - Hold captopril in AM prior to PCI  - Per Dr. Mancia, patient will receive 1 unit PRBC prior to PCI to maintain hemoglobin >10  - Per Dr. Gandara, will consider IV iron if patient is still anemic after PCI  - Monitor inputs and outputs, renal function, and body weights  - Keep Mg>2.2 and K>4.4    #Acute on chronic HFrEF exacerbation, resolving  - Cardiology consult appreciated  - Heart failure consult appreciated  - Continue with atorvastatin 80mg PO QD  - Continue with aspirin 81mg PO QD  - Continue with clopidogrel 75mg PO QD  - Continue with spironolactone 25mg PO QD  - Start and continue metoprolol 12.5mg PO Q6H  - Furosemide gtt and nitroglycerin gtt were discontinued on 9/29/2019  - Continue with captopril 6.25mg PO TID  - Hold captopril in AM prior to PCI  - Monitor inputs and outputs, renal function, and body weights  - Keep Mg>2.2 and K>4.4  - Patient may require Lifevest prior to discharge based on cath results  - Educated patient and family on cardiac rehab s/p cath    #Low back pain, improving  - Continue with lidocaine patches PRN    #C. diff enterocolitis, resolving  - Patient endorses only one bowel movement overnight  - C. diff PCR positive on 9/25/2019  - Continue with vancomycin 125mg PO Q6H for 10 days (Day #6)    #Diabetic ketoacidosis, resolved  - Sugars are well controlled  - Continue with insulin glargine 15 units SQ QHS  - Continue with insulin lispro 5 units SQ TID with meals  - Continue with insulin sliding scale  - Monitor finger stick glucose and BMP    #Borderline sinus tachycardia, resolved  - Hemoglobin and hematocrit are stable  - [facility] Duplex: no evidence of DVT bilaterally  - Hold Lovenox for PCI tomorrow  - Will discuss need for anticoagulation after PCI    #Paroxysmal atrial fibrillation with RVR, resolved  - Patient is in normal sinus rhythm  - Cardiology consult appreciated  - Continue with amiodarone 200mg PO BID  - Continue with digoxin 0.125mg PO QD  - Start and continue metoprolol 12.5mg PO Q6H  - Follow digoxin level in AM    #Possible urinary tract infection, resolving  - Patient has indwelling Castellno catheter  - Urine culture from 9/22/2019: no growth to date  - Continue with cefepime 1g IV Q8H (Day #7)  - Will receive a total of 10 days of treatment  - Urogynecology consult appreciated  - Castellon catheter replaced on 9/28/2019  - Urine culture 9/28/2019: normal ewa    #Transaminitis, resolved  - Likely secondary to congestive hepatopathy  - No RUQ pain on exam  - LFTs within normal limits for past 2 days  - Monitor LFTs    #Anxiety  - Continue with Xanax 0.5mg PO Q12H    #Misc  - DVT Prophylaxis: Lovenox 60mg SQ Q12H, will hold morning dose prior to PCI  - Diet: Dysphagia 3 diet with thin liquids, DASH/TLC, consistent carbohydrates with evening snack afterwards; NPO after midnight  - GI Prophylaxis: Pantoprazole 40mg PO QD  - Activity: Increase as tolerated  - IV Fluids: Encourage PO intake  - Code Status: DNR/DNI    Dispo: From home

## 2019-09-30 NOTE — PROGRESS NOTE ADULT - SUBJECTIVE AND OBJECTIVE BOX
SUBJ: No new complains      MEDICATIONS  (STANDING):  ALPRAZolam 0.5 milliGRAM(s) Oral two times a day  amiodarone    Tablet 200 milliGRAM(s) Oral two times a day  aspirin  chewable 81 milliGRAM(s) Oral daily  atorvastatin 80 milliGRAM(s) Oral daily  captopril 6.25 milliGRAM(s) Oral three times a day  cefepime   IVPB      cefepime   IVPB 1000 milliGRAM(s) IV Intermittent every 8 hours  chlorhexidine 4% Liquid 1 Application(s) Topical <User Schedule>  clopidogrel Tablet 75 milliGRAM(s) Oral daily  dextrose 5%. 1000 milliLiter(s) (50 mL/Hr) IV Continuous <Continuous>  dextrose 50% Injectable 12.5 Gram(s) IV Push once  dextrose 50% Injectable 25 Gram(s) IV Push once  dextrose 50% Injectable 25 Gram(s) IV Push once  digoxin     Tablet 0.125 milliGRAM(s) Oral daily  enoxaparin Injectable 60 milliGRAM(s) SubCutaneous every 12 hours  insulin glargine Injectable (LANTUS) 15 Unit(s) SubCutaneous at bedtime  insulin lispro (HumaLOG) corrective regimen sliding scale   SubCutaneous three times a day before meals  insulin lispro Injectable (HumaLOG) 5 Unit(s) SubCutaneous three times a day before meals  lidocaine   Patch 2 Patch Transdermal daily  metoprolol tartrate 12.5 milliGRAM(s) Oral two times a day  metoprolol tartrate 12.5 milliGRAM(s) Oral once  ondansetron Injectable 4 milliGRAM(s) IV Push once  pantoprazole    Tablet 40 milliGRAM(s) Oral before breakfast  spironolactone 25 milliGRAM(s) Oral daily  vancomycin    Solution 125 milliGRAM(s) Oral every 6 hours    MEDICATIONS  (PRN):  dextrose 40% Gel 15 Gram(s) Oral once PRN Blood Glucose LESS THAN 70 milliGRAM(s)/deciliter  glucagon  Injectable 1 milliGRAM(s) IntraMuscular once PRN Glucose LESS THAN 70 milligrams/deciliter            Vital Signs Last 24 Hrs  T(C): 35.9 (30 Sep 2019 08:00), Max: 36.3 (29 Sep 2019 18:00)  T(F): 96.7 (30 Sep 2019 08:00), Max: 97.4 (29 Sep 2019 18:00)  HR: 76 (30 Sep 2019 08:00) (76 - 88)  BP: 91/55 (30 Sep 2019 08:00) (80/44 - 112/63)  BP(mean): 77 (30 Sep 2019 08:00) (60 - 86)  RR: 25 (30 Sep 2019 08:00) (18 - 32)  SpO2: 97% (30 Sep 2019 08:00) (95% - 99%)     REVIEW OF SYSTEMS:  CONSTITUTIONAL: No fever, weight loss, or fatigue  CARDIOLOGY: PAtient denies chest pain, shortness of breath or syncopal episodes.   RESPIRATORY: denies shortness of breath, wheezeing.   NEUROLOGICAL: NO weakness, no focal deficits to report.  ENDOCRINOLOGICAL: no recent change in diabetic medications.   GI: no BRBPR, no N,V,diarrhea.    PSYCHIATRY: normal mood and affect  HEENT: no nasal discharge, no ecchymosis  SKIN: no ecchymosis, no breakdown  MUSCULOSKELETAL: Full range of motion x4.        PHYSICAL EXAM:  · CONSTITUTIONAL:	Well-developed, well nourished    BMI-  ·RESPIRATORY:   airway patent; breath sounds equal; good air movement; respirations non-labored; clear to auscultation bilaterally; no chest wall tenderness; no intercostal retractions; no rales,rhonchi or wheeze  · CARDIOVASCULAR	regular rate and rhythm  no rub  no murmur  normal PMI  · EXTREMITIES: No cyanosis, clubbing or edema  · VASCULAR: 	Equal and normal pulses (carotid, femoral, dorsalis pedis)  	  TELEMETRY:    ECG:    TTE:    LABS:                        9.7    7.64  )-----------( 428      ( 30 Sep 2019 05:07 )             31.8     09-30    144  |  103  |  42<H>  ----------------------------<  98  3.9   |  25  |  1.2    Ca    9.9      30 Sep 2019 05:07  Phos  2.2     09-30  Mg     2.0     09-30    TPro  6.6  /  Alb  3.4<L>  /  TBili  0.2  /  DBili  x   /  AST  25  /  ALT  23  /  AlkPhos  136<H>  09-30            I&O's Summary    29 Sep 2019 07:01  -  30 Sep 2019 07:00  --------------------------------------------------------  IN: 677 mL / OUT: 1755 mL / NET: -1078 mL    30 Sep 2019 07:01  -  30 Sep 2019 09:31  --------------------------------------------------------  IN: 240 mL / OUT: 40 mL / NET: 200 mL      BNP  RADIOLOGY & ADDITIONAL STUDIES:    IMPRESSION AND PLAN:    Patient doing better.  For PCI of LM with LV assist  continue with ASA and plavix.  CT surgery consider her not candidate for CABG  discussed with patient and family

## 2019-09-30 NOTE — PROGRESS NOTE ADULT - SUBJECTIVE AND OBJECTIVE BOX
Interval history: Volume status improved, patient is tolerated GDMT for HF well. Plan for protected PCI tomorrow.       HPI       67 y/o F with h/o DM, recent hip replacement admitted with c/o sob that started acutely 6 days ago. Per family, patient was lethargic, sob and complaint of nausea. She also felt chest discomfort that radiated to the back. Upon admission, an echocardiogram showed LVEF ~ 15% from ~ 45% one month earlier. His troponin were elevated and pro-BNP was 20 k. A lHC showed triple vessel disease with 80% disease of LM.       PMH: DM, Hip fracture     Social: Denies ETOH, smoking     FH: father  from heart disease in his 50s, mother  in her 30s from cancer

## 2019-10-01 LAB
ALBUMIN SERPL ELPH-MCNC: 3.3 G/DL — LOW (ref 3.5–5.2)
ALP SERPL-CCNC: 122 U/L — HIGH (ref 30–115)
ALT FLD-CCNC: 22 U/L — SIGNIFICANT CHANGE UP (ref 0–41)
ANION GAP SERPL CALC-SCNC: 14 MMOL/L — SIGNIFICANT CHANGE UP (ref 7–14)
APTT BLD: 39.9 SEC — HIGH (ref 27–39.2)
AST SERPL-CCNC: 27 U/L — SIGNIFICANT CHANGE UP (ref 0–41)
BILIRUB SERPL-MCNC: 0.3 MG/DL — SIGNIFICANT CHANGE UP (ref 0.2–1.2)
BUN SERPL-MCNC: 32 MG/DL — HIGH (ref 10–20)
CALCIUM SERPL-MCNC: 9.2 MG/DL — SIGNIFICANT CHANGE UP (ref 8.5–10.1)
CHLORIDE SERPL-SCNC: 106 MMOL/L — SIGNIFICANT CHANGE UP (ref 98–110)
CO2 SERPL-SCNC: 24 MMOL/L — SIGNIFICANT CHANGE UP (ref 17–32)
CREAT SERPL-MCNC: 1 MG/DL — SIGNIFICANT CHANGE UP (ref 0.7–1.5)
DIGOXIN SERPL-MCNC: 2.2 NG/ML — HIGH (ref 0.8–2)
GLUCOSE BLDC GLUCOMTR-MCNC: 119 MG/DL — HIGH (ref 70–99)
GLUCOSE BLDC GLUCOMTR-MCNC: 151 MG/DL — HIGH (ref 70–99)
GLUCOSE BLDC GLUCOMTR-MCNC: 162 MG/DL — HIGH (ref 70–99)
GLUCOSE BLDC GLUCOMTR-MCNC: 163 MG/DL — HIGH (ref 70–99)
GLUCOSE BLDC GLUCOMTR-MCNC: 165 MG/DL — HIGH (ref 70–99)
GLUCOSE BLDC GLUCOMTR-MCNC: 183 MG/DL — HIGH (ref 70–99)
GLUCOSE SERPL-MCNC: 134 MG/DL — HIGH (ref 70–99)
HCT VFR BLD CALC: 34.4 % — LOW (ref 37–47)
HGB BLD-MCNC: 10.7 G/DL — LOW (ref 12–16)
INR BLD: 1.2 RATIO — SIGNIFICANT CHANGE UP (ref 0.65–1.3)
MAGNESIUM SERPL-MCNC: 2 MG/DL — SIGNIFICANT CHANGE UP (ref 1.8–2.4)
MCHC RBC-ENTMCNC: 25.7 PG — LOW (ref 27–31)
MCHC RBC-ENTMCNC: 31.1 G/DL — LOW (ref 32–37)
MCV RBC AUTO: 82.5 FL — SIGNIFICANT CHANGE UP (ref 81–99)
NRBC # BLD: 0 /100 WBCS — SIGNIFICANT CHANGE UP (ref 0–0)
PHOSPHATE SERPL-MCNC: 2.4 MG/DL — SIGNIFICANT CHANGE UP (ref 2.1–4.9)
PLATELET # BLD AUTO: 408 K/UL — HIGH (ref 130–400)
POTASSIUM SERPL-MCNC: 3.7 MMOL/L — SIGNIFICANT CHANGE UP (ref 3.5–5)
POTASSIUM SERPL-SCNC: 3.7 MMOL/L — SIGNIFICANT CHANGE UP (ref 3.5–5)
PROT SERPL-MCNC: 6.1 G/DL — SIGNIFICANT CHANGE UP (ref 6–8)
PROTHROM AB SERPL-ACNC: 13.8 SEC — HIGH (ref 9.95–12.87)
RBC # BLD: 4.17 M/UL — LOW (ref 4.2–5.4)
RBC # FLD: 14.5 % — SIGNIFICANT CHANGE UP (ref 11.5–14.5)
SODIUM SERPL-SCNC: 144 MMOL/L — SIGNIFICANT CHANGE UP (ref 135–146)
WBC # BLD: 7.6 K/UL — SIGNIFICANT CHANGE UP (ref 4.8–10.8)
WBC # FLD AUTO: 7.6 K/UL — SIGNIFICANT CHANGE UP (ref 4.8–10.8)

## 2019-10-01 PROCEDURE — 93880 EXTRACRANIAL BILAT STUDY: CPT | Mod: 26

## 2019-10-01 PROCEDURE — 93010 ELECTROCARDIOGRAM REPORT: CPT

## 2019-10-01 PROCEDURE — 99233 SBSQ HOSP IP/OBS HIGH 50: CPT

## 2019-10-01 PROCEDURE — 93306 TTE W/DOPPLER COMPLETE: CPT | Mod: 26

## 2019-10-01 RX ORDER — ENOXAPARIN SODIUM 100 MG/ML
60 INJECTION SUBCUTANEOUS
Refills: 0 | Status: DISCONTINUED | OUTPATIENT
Start: 2019-10-01 | End: 2019-10-06

## 2019-10-01 RX ORDER — ENOXAPARIN SODIUM 100 MG/ML
40 INJECTION SUBCUTANEOUS ONCE
Refills: 0 | Status: COMPLETED | OUTPATIENT
Start: 2019-10-01 | End: 2019-10-01

## 2019-10-01 RX ORDER — ENOXAPARIN SODIUM 100 MG/ML
80 INJECTION SUBCUTANEOUS
Refills: 0 | Status: DISCONTINUED | OUTPATIENT
Start: 2019-10-01 | End: 2019-10-01

## 2019-10-01 RX ORDER — AMIODARONE HYDROCHLORIDE 400 MG/1
400 TABLET ORAL THREE TIMES A DAY
Refills: 0 | Status: DISCONTINUED | OUTPATIENT
Start: 2019-10-01 | End: 2019-10-04

## 2019-10-01 RX ADMIN — Medication 12.5 MILLIGRAM(S): at 17:52

## 2019-10-01 RX ADMIN — Medication 0.5 MILLIGRAM(S): at 17:52

## 2019-10-01 RX ADMIN — Medication 5 UNIT(S): at 17:53

## 2019-10-01 RX ADMIN — Medication 1: at 11:47

## 2019-10-01 RX ADMIN — ATORVASTATIN CALCIUM 80 MILLIGRAM(S): 80 TABLET, FILM COATED ORAL at 21:58

## 2019-10-01 RX ADMIN — Medication 125 MILLIGRAM(S): at 06:30

## 2019-10-01 RX ADMIN — CLOPIDOGREL BISULFATE 75 MILLIGRAM(S): 75 TABLET, FILM COATED ORAL at 11:09

## 2019-10-01 RX ADMIN — AMIODARONE HYDROCHLORIDE 200 MILLIGRAM(S): 400 TABLET ORAL at 06:29

## 2019-10-01 RX ADMIN — ENOXAPARIN SODIUM 40 MILLIGRAM(S): 100 INJECTION SUBCUTANEOUS at 11:09

## 2019-10-01 RX ADMIN — SPIRONOLACTONE 25 MILLIGRAM(S): 25 TABLET, FILM COATED ORAL at 06:30

## 2019-10-01 RX ADMIN — PANTOPRAZOLE SODIUM 40 MILLIGRAM(S): 20 TABLET, DELAYED RELEASE ORAL at 06:30

## 2019-10-01 RX ADMIN — AMIODARONE HYDROCHLORIDE 400 MILLIGRAM(S): 400 TABLET ORAL at 15:39

## 2019-10-01 RX ADMIN — Medication 1: at 17:53

## 2019-10-01 RX ADMIN — AMIODARONE HYDROCHLORIDE 400 MILLIGRAM(S): 400 TABLET ORAL at 21:58

## 2019-10-01 RX ADMIN — LIDOCAINE 2 PATCH: 4 CREAM TOPICAL at 17:53

## 2019-10-01 RX ADMIN — Medication 12.5 MILLIGRAM(S): at 11:09

## 2019-10-01 RX ADMIN — Medication 12.5 MILLIGRAM(S): at 23:08

## 2019-10-01 RX ADMIN — LIDOCAINE 2 PATCH: 4 CREAM TOPICAL at 22:30

## 2019-10-01 RX ADMIN — INSULIN GLARGINE 15 UNIT(S): 100 INJECTION, SOLUTION SUBCUTANEOUS at 22:24

## 2019-10-01 RX ADMIN — ENOXAPARIN SODIUM 60 MILLIGRAM(S): 100 INJECTION SUBCUTANEOUS at 17:52

## 2019-10-01 RX ADMIN — Medication 81 MILLIGRAM(S): at 11:09

## 2019-10-01 RX ADMIN — Medication 0.12 MILLIGRAM(S): at 21:58

## 2019-10-01 RX ADMIN — Medication 12.5 MILLIGRAM(S): at 06:30

## 2019-10-01 RX ADMIN — Medication 5 UNIT(S): at 11:48

## 2019-10-01 RX ADMIN — CEFEPIME 100 MILLIGRAM(S): 1 INJECTION, POWDER, FOR SOLUTION INTRAMUSCULAR; INTRAVENOUS at 06:28

## 2019-10-01 RX ADMIN — Medication 125 MILLIGRAM(S): at 23:08

## 2019-10-01 RX ADMIN — Medication 125 MILLIGRAM(S): at 17:52

## 2019-10-01 RX ADMIN — CHLORHEXIDINE GLUCONATE 1 APPLICATION(S): 213 SOLUTION TOPICAL at 06:28

## 2019-10-01 RX ADMIN — Medication 125 MILLIGRAM(S): at 11:12

## 2019-10-01 RX ADMIN — LIDOCAINE 2 PATCH: 4 CREAM TOPICAL at 11:10

## 2019-10-01 NOTE — PROGRESS NOTE ADULT - SUBJECTIVE AND OBJECTIVE BOX
GUS WILBURN  68y Female    CHIEF COMPLAINT:    Patient is a 68y old  Female who presents with a chief complaint of HFrEF (01 Oct 2019 10:18)      INTERVAL HPI/OVERNIGHT EVENTS:    Patient seen and examined. No acute events overnight. Patient feels better, c/o lower back pain    ROS: All other systems are negative.    Vital Signs:    T(F): 97 (10-01-19 @ 11:00), Max: 97.3 (10-01-19 @ 10:00)  HR: 72 (10-01-19 @ 11:00) (70 - 84)  BP: 121/65 (10-01-19 @ 11:00) (93/56 - 121/65)  RR: 33 (10-01-19 @ 11:00) (19 - 33)  SpO2: 95% (10-01-19 @ 11:00) (94% - 99%)    30 Sep 2019 07:01  -  01 Oct 2019 07:00  --------------------------------------------------------  IN: 1105 mL / OUT: 1385 mL / NET: -280 mL    POCT Blood Glucose.: 162 mg/dL (01 Oct 2019 11:34)  POCT Blood Glucose.: 165 mg/dL (01 Oct 2019 07:43)  POCT Blood Glucose.: 151 mg/dL (30 Sep 2019 23:59)  POCT Blood Glucose.: 120 mg/dL (30 Sep 2019 22:05)  POCT Blood Glucose.: 169 mg/dL (30 Sep 2019 16:19)    PHYSICAL EXAM:    GENERAL:  NAD  SKIN: No rashes or lesions  HEENT: Atraumatic. Normocephalic.   NECK: Supple, No JVD. No lymphadenopathy.  PULMONARY: CTA B/L. No wheezing. No rales  CVS: Normal S1, S2. Rate and Rhythm are regular.   ABDOMEN/GI: Soft, Nontender, Nondistended; BS present  MSK:  No edema B/L LE. No clubbing or cyanosis  NEUROLOGIC:  No motor or sensory deficit.  PSYCH: Alert & oriented x 3, normal affect    Consultant(s) Notes Reviewed:  [x ] YES  [ ] NO  Care Discussed with Consultants/Other Providers [ x] YES  [ ] NO    LABS:                        10.7   7.60  )-----------( 408      ( 01 Oct 2019 05:15 )             34.4     10-01    144  |  106  |  32<H>  ----------------------------<  134<H>  3.7   |  24  |  1.0    Ca    9.2      01 Oct 2019 05:15  Phos  2.4     10-01  Mg     2.0     10-01    TPro  6.1  /  Alb  3.3<L>  /  TBili  0.3  /  DBili  x   /  AST  27  /  ALT  22  /  AlkPhos  122<H>  10-01    PT/INR - ( 01 Oct 2019 05:15 )   PT: 13.80 sec;   INR: 1.20 ratio    PTT - ( 01 Oct 2019 05:15 )  PTT:39.9 sec    RADIOLOGY & ADDITIONAL TESTS:    Medications:  Standing  ALPRAZolam 0.5 milliGRAM(s) Oral two times a day  amiodarone    Tablet 200 milliGRAM(s) Oral two times a day  aspirin  chewable 81 milliGRAM(s) Oral daily  atorvastatin 80 milliGRAM(s) Oral daily  cefepime   IVPB      cefepime   IVPB 1000 milliGRAM(s) IV Intermittent every 8 hours  chlorhexidine 4% Liquid 1 Application(s) Topical <User Schedule>  clopidogrel Tablet 75 milliGRAM(s) Oral daily  dextrose 5%. 1000 milliLiter(s) IV Continuous <Continuous>  dextrose 50% Injectable 12.5 Gram(s) IV Push once  dextrose 50% Injectable 25 Gram(s) IV Push once  dextrose 50% Injectable 25 Gram(s) IV Push once  digoxin     Tablet 0.125 milliGRAM(s) Oral daily  insulin glargine Injectable (LANTUS) 15 Unit(s) SubCutaneous at bedtime  insulin lispro (HumaLOG) corrective regimen sliding scale   SubCutaneous three times a day before meals  insulin lispro Injectable (HumaLOG) 5 Unit(s) SubCutaneous three times a day before meals  lidocaine   Patch 2 Patch Transdermal daily  metoprolol tartrate 12.5 milliGRAM(s) Oral every 6 hours  ondansetron Injectable 4 milliGRAM(s) IV Push once  pantoprazole    Tablet 40 milliGRAM(s) Oral before breakfast  spironolactone 25 milliGRAM(s) Oral daily  vancomycin    Solution 125 milliGRAM(s) Oral every 6 hours    PRN Meds  dextrose 40% Gel 15 Gram(s) Oral once PRN  glucagon  Injectable 1 milliGRAM(s) IntraMuscular once PRN      Mendy Shi MD  s. [id number]

## 2019-10-01 NOTE — PROGRESS NOTE ADULT - ASSESSMENT
Patient is a 69yo female with PMHx of diabetes mellitus, paroxysmal atrial fibrillation, history of lower GI bleed and recently admitted for hip fracture who presented with acute hypoxic respiratory failure secondary to acute on chronic HFrEF s/p intubation. Found to have NSTEMI.    Acute hypoxic respiratory failure like due to Acute systolic and diastolic heart failure s/p extubation  - Patient currently on nasal cannula, awake/alert with family at bedside  - LVEF 15% (previously 45% 1 month ago approx)  - Off Furosemide/Nitroglycerin GTT since 9/29  - monitor I&O, weights, electrolytes, LFTs and renal function  - c/w metoprolol 12.5 mg q 6hrs,  captopril 6.25 mg q 8hrs and spironolactone 25 mg daily     NSTEMI/3V CAD  - 3 vessel CAD (LM, RCA, LCX, and LAD) on cardiac cath 9/27  - cardiology and CT surgery on board, poor surgical candidate for CABG  - PCI moved to Tomorrow  - c/w DAPT  - hold captopril/Spironolactone/Lovenox in AM morning of procedure    CDIFF +  - no further loose BM  - Continue with vancomycin 125mg PO Q6H for 10 days today    Diabetic ketoacidosis - resolved  - c/w Lantus and lispro  - monitor FS, patient endorses poor appetite    PAF, currently in sinus  - Continue with amiodarone 200mg PO BID  - Continue with digoxin 0.125mg PO QD    Uterine Prolapse with chronic montejo  - catheter exchanged by GYN   - outpatient follow up with Dr Rodriguez    #Progress Note Handoff  Pending (specify): clinical stability, further cardiac workup  Family discussion: Plan of care discussed with patient, aware and agreeable   Disposition:  unknown at this time    Patient remains critically ill, requires continuous monitoring in CCU

## 2019-10-01 NOTE — CONSULT NOTE ADULT - SUBJECTIVE AND OBJECTIVE BOX
GUS WILBURN  68y, Female  Allergy: No Known Allergies      HPI:  68F with PMHx of uncontrolled DM (non-compliant), viral myopathy, Paroxysmal Afib, recent labial abscess s/p ID, hip fracture s/p fixation, recent LGIB bleed now presents for shortness of breath. Patient has been having shortness of breath for the last day. Occurs on exertion and stationary however no worsening of breathing when laying down. Patient denied abdominal pain however family reports she has been having some abdominal discomfort over the last day. Patient also has an indwelling montejo for the last 3 weeks for urinary retention. Reports cough with no sputum production. Denies chest pain, fever, nausea/vomiting, diarrhea. (21 Sep 2019 21:52)  On cefepime and po vancomycin    FAMILY HISTORY:  FH: myocardial infarction  FH: stomach cancer    PAST MEDICAL & SURGICAL HISTORY:  Female bladder prolapse  Diabetes  History of repair of hip fracture        VITALS:  T(F): 97.5, Max: 97.5 (10-01-19 @ 12:00)  HR: 72  BP: 110/60  RR: 20Vital Signs Last 24 Hrs  T(C): 36.4 (01 Oct 2019 12:00), Max: 36.4 (01 Oct 2019 12:00)  T(F): 97.5 (01 Oct 2019 12:00), Max: 97.5 (01 Oct 2019 12:00)  HR: 72 (01 Oct 2019 13:00) (70 - 84)  BP: 110/60 (01 Oct 2019 13:00) (93/56 - 121/65)  BP(mean): 77 (01 Oct 2019 13:00) (65 - 98)  RR: 20 (01 Oct 2019 13:00) (19 - 33)  SpO2: 99% (01 Oct 2019 13:00) (94% - 99%)    TESTS & MEASUREMENTS:                        10.7   7.60  )-----------( 408      ( 01 Oct 2019 05:15 )             34.4     10-01    144  |  106  |  32<H>  ----------------------------<  134<H>  3.7   |  24  |  1.0    Ca    9.2      01 Oct 2019 05:15  Phos  2.4     10-01  Mg     2.0     10-01    TPro  6.1  /  Alb  3.3<L>  /  TBili  0.3  /  DBili  x   /  AST  27  /  ALT  22  /  AlkPhos  122<H>  10-01    LIVER FUNCTIONS - ( 01 Oct 2019 05:15 )  Alb: 3.3 g/dL / Pro: 6.1 g/dL / ALK PHOS: 122 U/L / ALT: 22 U/L / AST: 27 U/L / GGT: x             Culture - Urine (collected 09-28-19 @ 10:39)  Source: .Urine Catheterized  Final Report (09-29-19 @ 14:30):    <10,000 CFU/mL Normal Urogenital Ewa            RADIOLOGY & ADDITIONAL TESTS:    ANTIBIOTICS:  vancomycin    Solution 125 milliGRAM(s) Oral every 6 hours

## 2019-10-01 NOTE — PROGRESS NOTE ADULT - SUBJECTIVE AND OBJECTIVE BOX
SUBJ: No new complains      MEDICATIONS  (STANDING):  ALPRAZolam 0.5 milliGRAM(s) Oral two times a day  amiodarone    Tablet 200 milliGRAM(s) Oral two times a day  aspirin  chewable 81 milliGRAM(s) Oral daily  atorvastatin 80 milliGRAM(s) Oral daily  cefepime   IVPB      cefepime   IVPB 1000 milliGRAM(s) IV Intermittent every 8 hours  chlorhexidine 4% Liquid 1 Application(s) Topical <User Schedule>  clopidogrel Tablet 75 milliGRAM(s) Oral daily  dextrose 5%. 1000 milliLiter(s) (50 mL/Hr) IV Continuous <Continuous>  dextrose 50% Injectable 12.5 Gram(s) IV Push once  dextrose 50% Injectable 25 Gram(s) IV Push once  dextrose 50% Injectable 25 Gram(s) IV Push once  digoxin     Tablet 0.125 milliGRAM(s) Oral daily  insulin glargine Injectable (LANTUS) 15 Unit(s) SubCutaneous at bedtime  insulin lispro (HumaLOG) corrective regimen sliding scale   SubCutaneous three times a day before meals  insulin lispro Injectable (HumaLOG) 5 Unit(s) SubCutaneous three times a day before meals  lidocaine   Patch 2 Patch Transdermal daily  metoprolol tartrate 12.5 milliGRAM(s) Oral every 6 hours  ondansetron Injectable 4 milliGRAM(s) IV Push once  pantoprazole    Tablet 40 milliGRAM(s) Oral before breakfast  spironolactone 25 milliGRAM(s) Oral daily  vancomycin    Solution 125 milliGRAM(s) Oral every 6 hours    MEDICATIONS  (PRN):  dextrose 40% Gel 15 Gram(s) Oral once PRN Blood Glucose LESS THAN 70 milliGRAM(s)/deciliter  glucagon  Injectable 1 milliGRAM(s) IntraMuscular once PRN Glucose LESS THAN 70 milligrams/deciliter            Vital Signs Last 24 Hrs  T(C): 36.1 (01 Oct 2019 08:45), Max: 36.2 (30 Sep 2019 20:00)  T(F): 97 (01 Oct 2019 08:45), Max: 97.2 (30 Sep 2019 20:00)  HR: 70 (01 Oct 2019 08:45) (70 - 84)  BP: 114/66 (01 Oct 2019 08:45) (93/56 - 115/66)  BP(mean): 83 (01 Oct 2019 08:45) (65 - 91)  RR: 27 (01 Oct 2019 08:45) (19 - 28)  SpO2: 99% (01 Oct 2019 08:45) (97% - 99%)     REVIEW OF SYSTEMS:  CONSTITUTIONAL: No fever, weight loss, or fatigue  CARDIOLOGY: PAtient denies chest pain, shortness of breath or syncopal episodes.   RESPIRATORY: denies shortness of breath, wheezeing.   NEUROLOGICAL: NO weakness, no focal deficits to report.  ENDOCRINOLOGICAL: no recent change in diabetic medications.   GI: no BRBPR, no N,V,diarrhea.    PSYCHIATRY: normal mood and affect  HEENT: no nasal discharge, no ecchymosis  SKIN: no ecchymosis, no breakdown  MUSCULOSKELETAL: Full range of motion x4.        PHYSICAL EXAM:  · CONSTITUTIONAL:	Well-developed, well nourished    BMI-  ·RESPIRATORY:   airway patent; breath sounds equal; good air movement; respirations non-labored; clear to auscultation bilaterally; no chest wall tenderness; no intercostal retractions; no rales,rhonchi or wheeze  · CARDIOVASCULAR	regular rate and rhythm  no rub  no murmur  normal PMI  · EXTREMITIES: No cyanosis, clubbing or edema  · VASCULAR: 	Equal and normal pulses (carotid, femoral, dorsalis pedis)  	  TELEMETRY:    ECG:    TTE:    LABS:                        10.7   7.60  )-----------( 408      ( 01 Oct 2019 05:15 )             34.4     10-01    144  |  106  |  32<H>  ----------------------------<  134<H>  3.7   |  24  |  1.0    Ca    9.2      01 Oct 2019 05:15  Phos  2.4     10-01  Mg     2.0     10-01    TPro  6.1  /  Alb  3.3<L>  /  TBili  0.3  /  DBili  x   /  AST  27  /  ALT  22  /  AlkPhos  122<H>  10-01        PT/INR - ( 01 Oct 2019 05:15 )   PT: 13.80 sec;   INR: 1.20 ratio         PTT - ( 01 Oct 2019 05:15 )  PTT:39.9 sec    I&O's Summary    30 Sep 2019 07:01  -  01 Oct 2019 07:00  --------------------------------------------------------  IN: 1105 mL / OUT: 1385 mL / NET: -280 mL      BNP  RADIOLOGY & ADDITIONAL STUDIES:    IMPRESSION AND PLAN:    Patient seen and examined.  Cdiff   H/H better after transfusion.  continue with DAPT  adjourn the case till tomorrow  spent 1 hour in patient care SUBJ: No new complains      MEDICATIONS  (STANDING):  ALPRAZolam 0.5 milliGRAM(s) Oral two times a day  amiodarone    Tablet 200 milliGRAM(s) Oral two times a day  aspirin  chewable 81 milliGRAM(s) Oral daily  atorvastatin 80 milliGRAM(s) Oral daily  cefepime   IVPB      cefepime   IVPB 1000 milliGRAM(s) IV Intermittent every 8 hours  chlorhexidine 4% Liquid 1 Application(s) Topical <User Schedule>  clopidogrel Tablet 75 milliGRAM(s) Oral daily  dextrose 5%. 1000 milliLiter(s) (50 mL/Hr) IV Continuous <Continuous>  dextrose 50% Injectable 12.5 Gram(s) IV Push once  dextrose 50% Injectable 25 Gram(s) IV Push once  dextrose 50% Injectable 25 Gram(s) IV Push once  digoxin     Tablet 0.125 milliGRAM(s) Oral daily  insulin glargine Injectable (LANTUS) 15 Unit(s) SubCutaneous at bedtime  insulin lispro (HumaLOG) corrective regimen sliding scale   SubCutaneous three times a day before meals  insulin lispro Injectable (HumaLOG) 5 Unit(s) SubCutaneous three times a day before meals  lidocaine   Patch 2 Patch Transdermal daily  metoprolol tartrate 12.5 milliGRAM(s) Oral every 6 hours  ondansetron Injectable 4 milliGRAM(s) IV Push once  pantoprazole    Tablet 40 milliGRAM(s) Oral before breakfast  spironolactone 25 milliGRAM(s) Oral daily  vancomycin    Solution 125 milliGRAM(s) Oral every 6 hours    MEDICATIONS  (PRN):  dextrose 40% Gel 15 Gram(s) Oral once PRN Blood Glucose LESS THAN 70 milliGRAM(s)/deciliter  glucagon  Injectable 1 milliGRAM(s) IntraMuscular once PRN Glucose LESS THAN 70 milligrams/deciliter        Vital Signs Last 24 Hrs  T(C): 36.1 (01 Oct 2019 08:45), Max: 36.2 (30 Sep 2019 20:00)  T(F): 97 (01 Oct 2019 08:45), Max: 97.2 (30 Sep 2019 20:00)  HR: 70 (01 Oct 2019 08:45) (70 - 84)  BP: 114/66 (01 Oct 2019 08:45) (93/56 - 115/66)  BP(mean): 83 (01 Oct 2019 08:45) (65 - 91)  RR: 27 (01 Oct 2019 08:45) (19 - 28)  SpO2: 99% (01 Oct 2019 08:45) (97% - 99%)     REVIEW OF SYSTEMS:  CONSTITUTIONAL: No fever, weight loss, or fatigue  CARDIOLOGY: PAtient denies chest pain, shortness of breath or syncopal episodes.   RESPIRATORY: denies shortness of breath, wheezeing.   NEUROLOGICAL: NO weakness, no focal deficits to report.  ENDOCRINOLOGICAL: no recent change in diabetic medications.   GI: no BRBPR, no N,V,diarrhea.    PSYCHIATRY: normal mood and affect  HEENT: no nasal discharge, no ecchymosis  SKIN: no ecchymosis, no breakdown  MUSCULOSKELETAL: Full range of motion x4.        PHYSICAL EXAM:  · CONSTITUTIONAL:	Well-developed, well nourished    BMI-  ·RESPIRATORY:   airway patent; breath sounds equal; good air movement; respirations non-labored; clear to auscultation bilaterally; no chest wall tenderness; no intercostal retractions; no rales,rhonchi or wheeze  · CARDIOVASCULAR	regular rate and rhythm  no rub  no murmur  normal PMI  · EXTREMITIES: No cyanosis, clubbing or edema  · VASCULAR: 	Equal and normal pulses (carotid, femoral, dorsalis pedis)    9/22/2019	  2D Echo: LV EF < 20 %     TELEMETRY:    ECG:    TTE:    LABS:                        10.7   7.60  )-----------( 408      ( 01 Oct 2019 05:15 )             34.4     10-01    144  |  106  |  32<H>  ----------------------------<  134<H>  3.7   |  24  |  1.0    Ca    9.2      01 Oct 2019 05:15  Phos  2.4     10-01  Mg     2.0     10-01    TPro  6.1  /  Alb  3.3<L>  /  TBili  0.3  /  DBili  x   /  AST  27  /  ALT  22  /  AlkPhos  122<H>  10-01        PT/INR - ( 01 Oct 2019 05:15 )   PT: 13.80 sec;   INR: 1.20 ratio         PTT - ( 01 Oct 2019 05:15 )  PTT:39.9 sec    I&O's Summary    30 Sep 2019 07:01  -  01 Oct 2019 07:00  --------------------------------------------------------  IN: 1105 mL / OUT: 1385 mL / NET: -280 mL      BNP  RADIOLOGY & ADDITIONAL STUDIES:    IMPRESSION AND PLAN:    Patient seen and examined.  Cdiff   H/H better after transfusion.  continue with DAPT  adjourn the case till tomorrow  spent 1 hour in patient care

## 2019-10-01 NOTE — CONSULT NOTE ADULT - ASSESSMENT
69yo female with PMHx of diabetes mellitus, paroxysmal atrial fibrillation, history of lower GI bleed and recently admitted for hip fracture who presented with acute hypoxic respiratory failure secondary to acute on chronic HFrEF s/p intubation. Found to have NSTEMI.    IMPRESSION:  CD colitis which is resolving (presently ni diarrhea )  No pNA  CHF with pleural effusions bilaterally  No pyelonephritis ( UCx colonized with serratia)  BCX 9/22 NG  UCx 9/28 NG    RECOMMENDATIONS:  D/c cefepime  po vancomycin 125 mg q6h for a total of 14d  D/c contact precautions  recall prn please

## 2019-10-02 LAB
ALBUMIN SERPL ELPH-MCNC: 3.4 G/DL — LOW (ref 3.5–5.2)
ALP SERPL-CCNC: 126 U/L — HIGH (ref 30–115)
ALT FLD-CCNC: 25 U/L — SIGNIFICANT CHANGE UP (ref 0–41)
ANION GAP SERPL CALC-SCNC: 16 MMOL/L — HIGH (ref 7–14)
APTT BLD: 43 SEC — HIGH (ref 27–39.2)
AST SERPL-CCNC: 30 U/L — SIGNIFICANT CHANGE UP (ref 0–41)
BILIRUB SERPL-MCNC: 0.2 MG/DL — SIGNIFICANT CHANGE UP (ref 0.2–1.2)
BLD GP AB SCN SERPL QL: SIGNIFICANT CHANGE UP
BUN SERPL-MCNC: 31 MG/DL — HIGH (ref 10–20)
CALCIUM SERPL-MCNC: 9.3 MG/DL — SIGNIFICANT CHANGE UP (ref 8.5–10.1)
CHLORIDE SERPL-SCNC: 105 MMOL/L — SIGNIFICANT CHANGE UP (ref 98–110)
CO2 SERPL-SCNC: 22 MMOL/L — SIGNIFICANT CHANGE UP (ref 17–32)
CREAT SERPL-MCNC: 1 MG/DL — SIGNIFICANT CHANGE UP (ref 0.7–1.5)
GLUCOSE BLDC GLUCOMTR-MCNC: 224 MG/DL — HIGH (ref 70–99)
GLUCOSE BLDC GLUCOMTR-MCNC: 228 MG/DL — HIGH (ref 70–99)
GLUCOSE BLDC GLUCOMTR-MCNC: 238 MG/DL — HIGH (ref 70–99)
GLUCOSE SERPL-MCNC: 164 MG/DL — HIGH (ref 70–99)
HCT VFR BLD CALC: 31.6 % — LOW (ref 37–47)
HGB BLD-MCNC: 9.9 G/DL — LOW (ref 12–16)
INR BLD: 1.19 RATIO — SIGNIFICANT CHANGE UP (ref 0.65–1.3)
MAGNESIUM SERPL-MCNC: 2 MG/DL — SIGNIFICANT CHANGE UP (ref 1.8–2.4)
MCHC RBC-ENTMCNC: 25.8 PG — LOW (ref 27–31)
MCHC RBC-ENTMCNC: 31.3 G/DL — LOW (ref 32–37)
MCV RBC AUTO: 82.5 FL — SIGNIFICANT CHANGE UP (ref 81–99)
NRBC # BLD: 0 /100 WBCS — SIGNIFICANT CHANGE UP (ref 0–0)
PLATELET # BLD AUTO: 365 K/UL — SIGNIFICANT CHANGE UP (ref 130–400)
POTASSIUM SERPL-MCNC: 3.9 MMOL/L — SIGNIFICANT CHANGE UP (ref 3.5–5)
POTASSIUM SERPL-SCNC: 3.9 MMOL/L — SIGNIFICANT CHANGE UP (ref 3.5–5)
PROT SERPL-MCNC: 6.1 G/DL — SIGNIFICANT CHANGE UP (ref 6–8)
PROTHROM AB SERPL-ACNC: 13.7 SEC — HIGH (ref 9.95–12.87)
RBC # BLD: 3.83 M/UL — LOW (ref 4.2–5.4)
RBC # FLD: 14.6 % — HIGH (ref 11.5–14.5)
SODIUM SERPL-SCNC: 143 MMOL/L — SIGNIFICANT CHANGE UP (ref 135–146)
WBC # BLD: 7.57 K/UL — SIGNIFICANT CHANGE UP (ref 4.8–10.8)
WBC # FLD AUTO: 7.57 K/UL — SIGNIFICANT CHANGE UP (ref 4.8–10.8)

## 2019-10-02 PROCEDURE — 99232 SBSQ HOSP IP/OBS MODERATE 35: CPT

## 2019-10-02 PROCEDURE — 93010 ELECTROCARDIOGRAM REPORT: CPT

## 2019-10-02 PROCEDURE — 99233 SBSQ HOSP IP/OBS HIGH 50: CPT

## 2019-10-02 RX ORDER — METOPROLOL TARTRATE 50 MG
50 TABLET ORAL DAILY
Refills: 0 | Status: DISCONTINUED | OUTPATIENT
Start: 2019-10-02 | End: 2019-10-04

## 2019-10-02 RX ORDER — LOSARTAN POTASSIUM 100 MG/1
25 TABLET, FILM COATED ORAL DAILY
Refills: 0 | Status: DISCONTINUED | OUTPATIENT
Start: 2019-10-02 | End: 2019-10-04

## 2019-10-02 RX ORDER — METOPROLOL TARTRATE 50 MG
25 TABLET ORAL AT BEDTIME
Refills: 0 | Status: DISCONTINUED | OUTPATIENT
Start: 2019-10-02 | End: 2019-10-04

## 2019-10-02 RX ORDER — LANOLIN ALCOHOL/MO/W.PET/CERES
3 CREAM (GRAM) TOPICAL AT BEDTIME
Refills: 0 | Status: DISCONTINUED | OUTPATIENT
Start: 2019-10-02 | End: 2019-10-07

## 2019-10-02 RX ADMIN — Medication 2: at 12:30

## 2019-10-02 RX ADMIN — PANTOPRAZOLE SODIUM 40 MILLIGRAM(S): 20 TABLET, DELAYED RELEASE ORAL at 05:52

## 2019-10-02 RX ADMIN — Medication 125 MILLIGRAM(S): at 12:15

## 2019-10-02 RX ADMIN — LIDOCAINE 2 PATCH: 4 CREAM TOPICAL at 12:15

## 2019-10-02 RX ADMIN — Medication 50 MILLIGRAM(S): at 12:12

## 2019-10-02 RX ADMIN — ENOXAPARIN SODIUM 60 MILLIGRAM(S): 100 INJECTION SUBCUTANEOUS at 05:53

## 2019-10-02 RX ADMIN — Medication 25 MILLIGRAM(S): at 21:09

## 2019-10-02 RX ADMIN — Medication 81 MILLIGRAM(S): at 12:10

## 2019-10-02 RX ADMIN — Medication 5 UNIT(S): at 12:31

## 2019-10-02 RX ADMIN — Medication 0.5 MILLIGRAM(S): at 05:51

## 2019-10-02 RX ADMIN — AMIODARONE HYDROCHLORIDE 400 MILLIGRAM(S): 400 TABLET ORAL at 14:40

## 2019-10-02 RX ADMIN — SPIRONOLACTONE 25 MILLIGRAM(S): 25 TABLET, FILM COATED ORAL at 05:53

## 2019-10-02 RX ADMIN — Medication 125 MILLIGRAM(S): at 05:53

## 2019-10-02 RX ADMIN — CHLORHEXIDINE GLUCONATE 1 APPLICATION(S): 213 SOLUTION TOPICAL at 05:52

## 2019-10-02 RX ADMIN — INSULIN GLARGINE 15 UNIT(S): 100 INJECTION, SOLUTION SUBCUTANEOUS at 21:09

## 2019-10-02 RX ADMIN — LIDOCAINE 2 PATCH: 4 CREAM TOPICAL at 21:12

## 2019-10-02 RX ADMIN — ENOXAPARIN SODIUM 60 MILLIGRAM(S): 100 INJECTION SUBCUTANEOUS at 17:59

## 2019-10-02 RX ADMIN — Medication 2: at 16:57

## 2019-10-02 RX ADMIN — LIDOCAINE 2 PATCH: 4 CREAM TOPICAL at 19:13

## 2019-10-02 RX ADMIN — AMIODARONE HYDROCHLORIDE 400 MILLIGRAM(S): 400 TABLET ORAL at 05:52

## 2019-10-02 RX ADMIN — Medication 5 UNIT(S): at 16:58

## 2019-10-02 RX ADMIN — ATORVASTATIN CALCIUM 80 MILLIGRAM(S): 80 TABLET, FILM COATED ORAL at 21:09

## 2019-10-02 RX ADMIN — AMIODARONE HYDROCHLORIDE 400 MILLIGRAM(S): 400 TABLET ORAL at 21:08

## 2019-10-02 RX ADMIN — Medication 0.12 MILLIGRAM(S): at 21:09

## 2019-10-02 RX ADMIN — Medication 3 MILLIGRAM(S): at 22:24

## 2019-10-02 RX ADMIN — Medication 12.5 MILLIGRAM(S): at 05:52

## 2019-10-02 RX ADMIN — LOSARTAN POTASSIUM 25 MILLIGRAM(S): 100 TABLET, FILM COATED ORAL at 12:12

## 2019-10-02 NOTE — CONSULT NOTE ADULT - SUBJECTIVE AND OBJECTIVE BOX
HPI:  68F with PMHx of uncontrolled DM (non-compliant), viral myopathy, Paroxysmal Afib, recent labial abscess s/p ID, hip fracture s/p fixation, recent LGIB bleed now presents for shortness of breath. Patient has been having shortness of breath for the last day. Occurs on exertion and stationary however no worsening of breathing when laying down. Patient denied abdominal pain however family reports she has been having some abdominal discomfort over the last day. Patient also has an indwelling montejo for the last 3 weeks for urinary retention. Reports cough with no sputum production. Denies chest pain, fever, nausea/vomiting, diarrhea. (21 Sep 2019 21:52)      PAST MEDICAL & SURGICAL HISTORY:  Female bladder prolapse  Diabetes  History of repair of hip fracture      Hospital Course:    TODAY'S SUBJECTIVE & REVIEW OF SYMPTOMS:     Constitutional WNL   Cardio WNL   Resp WNL   GI WNL  Heme WNL  Endo WNL  Skin WNL  MSK Weakness  Neuro WNL  Cognitive WNL  Psych WNL      MEDICATIONS  (STANDING):  ALPRAZolam 0.5 milliGRAM(s) Oral two times a day  amiodarone    Tablet 400 milliGRAM(s) Oral three times a day  aspirin  chewable 81 milliGRAM(s) Oral daily  atorvastatin 80 milliGRAM(s) Oral daily  chlorhexidine 4% Liquid 1 Application(s) Topical <User Schedule>  dextrose 5%. 1000 milliLiter(s) (50 mL/Hr) IV Continuous <Continuous>  dextrose 50% Injectable 12.5 Gram(s) IV Push once  dextrose 50% Injectable 25 Gram(s) IV Push once  dextrose 50% Injectable 25 Gram(s) IV Push once  digoxin     Tablet 0.125 milliGRAM(s) Oral daily  enoxaparin Injectable 60 milliGRAM(s) SubCutaneous two times a day  insulin glargine Injectable (LANTUS) 15 Unit(s) SubCutaneous at bedtime  insulin lispro (HumaLOG) corrective regimen sliding scale   SubCutaneous three times a day before meals  insulin lispro Injectable (HumaLOG) 5 Unit(s) SubCutaneous three times a day before meals  lidocaine   Patch 2 Patch Transdermal daily  losartan 25 milliGRAM(s) Oral daily  metoprolol succinate ER 50 milliGRAM(s) Oral daily  metoprolol succinate ER 25 milliGRAM(s) Oral at bedtime  ondansetron Injectable 4 milliGRAM(s) IV Push once  pantoprazole    Tablet 40 milliGRAM(s) Oral before breakfast  spironolactone 25 milliGRAM(s) Oral daily  vancomycin    Solution 125 milliGRAM(s) Oral every 6 hours    MEDICATIONS  (PRN):  dextrose 40% Gel 15 Gram(s) Oral once PRN Blood Glucose LESS THAN 70 milliGRAM(s)/deciliter  glucagon  Injectable 1 milliGRAM(s) IntraMuscular once PRN Glucose LESS THAN 70 milligrams/deciliter      FAMILY HISTORY:  FH: myocardial infarction  FH: stomach cancer      Allergies    No Known Allergies    Intolerances        SOCIAL HISTORY:    [  ] Etoh  [  ] Smoking  [  ] Substance abuse     Home Environment:  [  ] Home Alone  [x  ] Lives with Family  [  ] Home Health Aid    Dwelling:  [  ] Apartment  [x  ] Private House  [  ] Adult Home  [  ] Skilled Nursing Facility      [  ] Short Term  [  ] Long Term  [x  ] Stairs       Elevator [  ]    FUNCTIONAL STATUS PTA: (Check all that apply)  Ambulation: [x   ]Independent    [  ] Dependent     [  ] Non-Ambulatory  Assistive Device: [  ] SA Cane  [  ]  Q Cane  [x  ] Walker  [  ]  Wheelchair  ADL : [  x] Independent  [  ]  Dependent       Vital Signs Last 24 Hrs  T(C): 36.2 (02 Oct 2019 08:00), Max: 36.6 (02 Oct 2019 00:00)  T(F): 97.1 (02 Oct 2019 08:00), Max: 97.8 (02 Oct 2019 00:00)  HR: 66 (02 Oct 2019 10:00) (62 - 80)  BP: 115/57 (02 Oct 2019 10:00) (105/65 - 124/64)  BP(mean): 82 (02 Oct 2019 10:00) (70 - 88)  RR: 20 (02 Oct 2019 10:00) (16 - 27)  SpO2: 99% (02 Oct 2019 10:00) (94% - 99%)      PHYSICAL EXAM: Alert & Oriented X3  GENERAL: NAD, well-groomed, well-developed  HEAD:  Atraumatic, Normocephalic  CHEST/LUNG: Clear   HEART: S1S2+  ABDOMEN: Soft, Nontender  EXTREMITIES:  no calf tenderness    NERVOUS SYSTEM:  Cranial Nerves 2-12 intact [  ] Abnormal  [  ]  ROM: WFL all extremities [ x ]  Abnormal [  ]  Motor Strength: WFL all extremities  [  ]  Abnormal [ x ]4/5 all ext  Sensation: intact to light touch [  ] Abnormal [ x ]decreased light touch at distal legs  Reflexes: Symmetric [  ]  Abnormal [  ]    FUNCTIONAL STATUS:  Bed Mobility: Independent [  ]  Supervision [  ]  Needs Assistance [ x ]  N/A [  ]  Transfers: Independent [  ]  Supervision [  ]  Needs Assistance [ x ]  N/A [  ]   Ambulation: Independent [  ]  Supervision [  ]  Needs Assistance [ x ]  N/A [  ]  ADL: Independent [  ] Requires Assistance [  ] N/A [  ]      LABS:                        9.9    7.57  )-----------( 365      ( 02 Oct 2019 04:26 )             31.6     10-02    143  |  105  |  31<H>  ----------------------------<  164<H>  3.9   |  22  |  1.0    Ca    9.3      02 Oct 2019 04:26  Phos  2.4     10-01  Mg     2.0     10-02    TPro  6.1  /  Alb  3.4<L>  /  TBili  0.2  /  DBili  x   /  AST  30  /  ALT  25  /  AlkPhos  126<H>  10-02    PT/INR - ( 02 Oct 2019 04:26 )   PT: 13.70 sec;   INR: 1.19 ratio         PTT - ( 02 Oct 2019 04:26 )  PTT:43.0 sec      RADIOLOGY & ADDITIONAL STUDIES:    Assesment:

## 2019-10-02 NOTE — PROGRESS NOTE ADULT - ASSESSMENT
69yo female with PMHx as above who presented with acute hypoxic respiratory failure secondary to acute on chronic HFrEF s/p intubation. Found to have NSTEMI.    #Acute hypoxic respiratory failure likely in the setting of acute systolic heart failure-improving   -euvolemic and hemodynamically stable  -continue nasal cannula to maintain O2 >92%  -continue metoprolol 12.5mg q6h, spiranolactone 25mg PO daily, and captopril 6.25mg PO q8h    #NSTEMI/ triple vessel CAD  cardiac cath (9/27): 3 vessel- LM, LCX, and LAD  -per CT and cardiology discussion as well as with patient/family plan for CABG early next week  -hold plavix per cardiology recommendation     #Cdiff  no further loose stools  -cont vancomycin 125mg PO q6h for a total of 10days (Day 8 of 10)    #Paroxysmal A.fib  controlled   -continue amiodarone 200mg PO q12h and digoxin 0.125mg PO daily    #Diabetes  stable  -continue with lantus and lispro    #uterine prolapse with indwelling montejo  -outpatient follow up with Dr. Rodriguez    Progress Note Handoff  Pending:  CABG early next week.  Patient/Family discussion: met with patient and family at bedside. updated on plan for CABG early next week. Patient and family aware and all questions and concerns addressed.  Disposition: plan for transfer of services to CT surg as patient planning for CABG next week

## 2019-10-02 NOTE — CONSULT NOTE ADULT - ASSESSMENT
IMPRESSION: Rehab of gait dysfunction    PRECAUTIONS: [  ] Cardiac  [  ] Respiratory  [  ] Seizures [  ] Contact Isolation  [  ] Droplet Isolation  [  ] Other    Weight Bearing Status:     RECOMMENDATION:    Out of Bed to Chair     DVT/Decubiti Prophylaxis    REHAB PLAN:     [ x  ] Bedside P/T 3-5 times a week   [   ]   Bedside O/T  2-3 times a week             [   ] No Rehab Therapy Indicated                   [   ]  Speech Therapy   Conditioning/ROM                                    ADL  Bed Mobility                                               Conditioning/ROM  Transfers                                                     Bed Mobility  Sitting /Standing Balance                         Transfers                                        Gait Training                                               Sitting/Standing Balance  Stair Training [   ]Applicable                    Home equipment Eval                                                                        Splinting  [   ] Only      GOALS:   ADL   [   ]   Independent                    Transfers  [ x  ] Independent                          Ambulation  [ x  ] Independent     [  x  ] With device                            [   ]  CG                                                         [   ]  CG                                                                  [   ] CG                            [    ] Min A                                                   [   ] Min A                                                              [   ] Min  A          DISCHARGE PLAN:   [   ]  Good candidate for Intensive Rehabilitation/Hospital based                                             Will tolerate 3hrs Intensive Rehab Daily                                       [    ]  Short Term Rehab in Skilled Nursing Facility                                       [ x   ]  Home with Outpatient or  services                                         [    ]  Possible Candidate for Intensive Hospital based Rehab

## 2019-10-02 NOTE — PROGRESS NOTE ADULT - SUBJECTIVE AND OBJECTIVE BOX
Interval history: Patient remains hemodynamically stable, she is euvolemic on exam.        HPI       69 y/o F with h/o DM, recent hip replacement admitted with c/o sob that started acutely 6 days ago. Per family, patient was lethargic, sob and complaint of nausea. She also felt chest discomfort that radiated to the back. Upon admission, an echocardiogram showed LVEF ~ 15% from ~ 45% one month earlier. His troponin were elevated and pro-BNP was 20 k. A lHC showed triple vessel disease with 80% disease of LM.       PMH: DM, Hip fracture     Social: Denies ETOH, smoking     FH: father  from heart disease in his 50s, mother  in her 30s from cancer

## 2019-10-02 NOTE — PROGRESS NOTE ADULT - SUBJECTIVE AND OBJECTIVE BOX
SUBJECTIVE ASSESSMENT:  pt feels better since she was seen last time     Vital Signs Last 24 Hrs  T(C): 36.2 (02 Oct 2019 08:00), Max: 36.6 (02 Oct 2019 00:00)  T(F): 97.1 (02 Oct 2019 08:00), Max: 97.8 (02 Oct 2019 00:00)  HR: 66 (02 Oct 2019 10:00) (62 - 80)  BP: 115/57 (02 Oct 2019 10:00) (105/65 - 124/64)  BP(mean): 82 (02 Oct 2019 10:00) (70 - 88)  RR: 20 (02 Oct 2019 10:00) (16 - 27)  SpO2: 99% (02 Oct 2019 10:00) (94% - 99%)  10-01-19 @ 07:01  -  10-02-19 @ 07:00  --------------------------------------------------------  IN: 320 mL / OUT: 670 mL / NET: -350 mL    A&OX3 in NAD  decreased bs at the bases bilaterally  nl s1, s2  abd soft/NT/ND  trace peripheral edema    LABS:                        9.9    7.57  )-----------( 365      ( 02 Oct 2019 04:26 )             31.6     PT/INR - ( 02 Oct 2019 04:26 )   PT: 13.70 sec;   INR: 1.19 ratio       PTT - ( 02 Oct 2019 04:26 )  PTT:43.0 sec  10-02    143  |  105  |  31<H>  ----------------------------<  164<H>  3.9   |  22  |  1.0    Ca    9.3      02 Oct 2019 04:26  Phos  2.4     10-01  Mg     2.0     10-02    TPro  6.1  /  Alb  3.4<L>  /  TBili  0.2  /  DBili  x   /  AST  30  /  ALT  25  /  AlkPhos  126<H>  10-02    MEDICATIONS  (STANDING):  ALPRAZolam 0.5 milliGRAM(s) Oral two times a day  amiodarone    Tablet 400 milliGRAM(s) Oral three times a day  aspirin  chewable 81 milliGRAM(s) Oral daily  atorvastatin 80 milliGRAM(s) Oral daily  digoxin     Tablet 0.125 milliGRAM(s) Oral daily  enoxaparin Injectable 60 milliGRAM(s) SubCutaneous two times a day  insulin glargine Injectable (LANTUS) 15 Unit(s) SubCutaneous at bedtime  insulin lispro (HumaLOG) corrective regimen sliding scale   SubCutaneous three times a day before meals  insulin lispro Injectable (HumaLOG) 5 Unit(s) SubCutaneous three times a day before meals  losartan 25 milliGRAM(s) Oral daily  metoprolol succinate ER 50 milliGRAM(s) Oral daily  metoprolol succinate ER 25 milliGRAM(s) Oral at bedtime  ondansetron Injectable 4 milliGRAM(s) IV Push once  pantoprazole    Tablet 40 milliGRAM(s) Oral before breakfast  spironolactone 25 milliGRAM(s) Oral daily  vancomycin    Solution 125 milliGRAM(s) Oral every 6 hours    MEDICATIONS  (PRN):  dextrose 40% Gel 15 Gram(s) Oral once PRN Blood Glucose LESS THAN 70 milliGRAM(s)/deciliter  glucagon  Injectable 1 milliGRAM(s) IntraMuscular once PRN Glucose LESS THAN 70 milligrams/deciliter

## 2019-10-02 NOTE — PROGRESS NOTE ADULT - ASSESSMENT
65 y/o F with h/o DM admitted with c/o acute onset of SOB associated with nausea, back pain and weakness. Patient with positive troponin on admission and LVEF of ~ 15%. 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 Volume status improved. Tolerating  HF meds.

## 2019-10-02 NOTE — PROGRESS NOTE ADULT - SUBJECTIVE AND OBJECTIVE BOX
SUBJ: No new complains      MEDICATIONS  (STANDING):  ALPRAZolam 0.5 milliGRAM(s) Oral two times a day  amiodarone    Tablet 400 milliGRAM(s) Oral three times a day  aspirin  chewable 81 milliGRAM(s) Oral daily  atorvastatin 80 milliGRAM(s) Oral daily  chlorhexidine 4% Liquid 1 Application(s) Topical <User Schedule>  dextrose 5%. 1000 milliLiter(s) (50 mL/Hr) IV Continuous <Continuous>  dextrose 50% Injectable 12.5 Gram(s) IV Push once  dextrose 50% Injectable 25 Gram(s) IV Push once  dextrose 50% Injectable 25 Gram(s) IV Push once  digoxin     Tablet 0.125 milliGRAM(s) Oral daily  enoxaparin Injectable 60 milliGRAM(s) SubCutaneous two times a day  insulin glargine Injectable (LANTUS) 15 Unit(s) SubCutaneous at bedtime  insulin lispro (HumaLOG) corrective regimen sliding scale   SubCutaneous three times a day before meals  insulin lispro Injectable (HumaLOG) 5 Unit(s) SubCutaneous three times a day before meals  lidocaine   Patch 2 Patch Transdermal daily  metoprolol tartrate 12.5 milliGRAM(s) Oral every 6 hours  ondansetron Injectable 4 milliGRAM(s) IV Push once  pantoprazole    Tablet 40 milliGRAM(s) Oral before breakfast  spironolactone 25 milliGRAM(s) Oral daily  vancomycin    Solution 125 milliGRAM(s) Oral every 6 hours    MEDICATIONS  (PRN):  dextrose 40% Gel 15 Gram(s) Oral once PRN Blood Glucose LESS THAN 70 milliGRAM(s)/deciliter  glucagon  Injectable 1 milliGRAM(s) IntraMuscular once PRN Glucose LESS THAN 70 milligrams/deciliter            Vital Signs Last 24 Hrs  T(C): 36.2 (02 Oct 2019 04:00), Max: 36.6 (02 Oct 2019 00:00)  T(F): 97.1 (02 Oct 2019 04:00), Max: 97.8 (02 Oct 2019 00:00)  HR: 72 (02 Oct 2019 06:00) (64 - 80)  BP: 124/64 (02 Oct 2019 06:00) (99/57 - 124/64)  BP(mean): 81 (02 Oct 2019 06:00) (72 - 98)  RR: 20 (02 Oct 2019 06:00) (16 - 33)  SpO2: 99% (02 Oct 2019 06:00) (94% - 99%)     REVIEW OF SYSTEMS:  CONSTITUTIONAL: No fever, weight loss, or fatigue  CARDIOLOGY: PAtient denies chest pain, shortness of breath or syncopal episodes.   RESPIRATORY: denies shortness of breath, wheezeing.   NEUROLOGICAL: NO weakness, no focal deficits to report.  ENDOCRINOLOGICAL: no recent change in diabetic medications.   GI: no BRBPR, no N,V,diarrhea.    PSYCHIATRY: normal mood and affect  HEENT: no nasal discharge, no ecchymosis  SKIN: no ecchymosis, no breakdown  MUSCULOSKELETAL: Full range of motion x4.        PHYSICAL EXAM:  · CONSTITUTIONAL:	Well-developed, well nourished    BMI-  ·RESPIRATORY:   airway patent; breath sounds equal; good air movement; respirations non-labored; clear to auscultation bilaterally; no chest wall tenderness; no intercostal retractions; no rales,rhonchi or wheeze  · CARDIOVASCULAR	regular rate and rhythm  no rub  no murmur  normal PMI  · EXTREMITIES: No cyanosis, clubbing or edema  · VASCULAR: 	Equal and normal pulses (carotid, femoral, dorsalis pedis)  	  TELEMETRY:    ECG:    TTE:    LABS:                        9.9    7.57  )-----------( 365      ( 02 Oct 2019 04:26 )             31.6     10-02    143  |  105  |  31<H>  ----------------------------<  164<H>  3.9   |  22  |  1.0    Ca    9.3      02 Oct 2019 04:26  Phos  2.4     10-01  Mg     2.0     10-02    TPro  6.1  /  Alb  3.4<L>  /  TBili  0.2  /  DBili  x   /  AST  30  /  ALT  25  /  AlkPhos  126<H>  10-02        PT/INR - ( 02 Oct 2019 04:26 )   PT: 13.70 sec;   INR: 1.19 ratio         PTT - ( 02 Oct 2019 04:26 )  PTT:43.0 sec    I&O's Summary    01 Oct 2019 07:01  -  02 Oct 2019 07:00  --------------------------------------------------------  IN: 320 mL / OUT: 670 mL / NET: -350 mL      BNP  RADIOLOGY & ADDITIONAL STUDIES:    IMPRESSION AND PLAN:    Case was discussed in cath conference, with CT surgery.  patient will go for CABG  Also case discussed with family  Hold plavix

## 2019-10-02 NOTE — PROGRESS NOTE ADULT - SUBJECTIVE AND OBJECTIVE BOX
GUS WILBURN 68y Female  MRN#: [id number]   Hospital Day: 11d    SUBJECTIVE  Patient is a 68y old Female who presents with a chief complaint of CHF/respiratory failure (02 Oct 2019 12:10)  Currently admitted to medicine with the primary diagnosis of Chest pain    INTERVAL HPI AND OVERNIGHT EVENTS:  Patient was examined and seen at bedside. This morning she is resting comfortably in bed and reports no issues or overnight events. Patient ambulating well around the unit today with a walker.    REVIEW OF SYMPTOMS:  CONSTITUTIONAL: No weakness, fevers or chills; No headaches  EYES: No visual changes, eye pain, or discharge  ENT: No vertigo; No ear pain or change in hearing; No sore throat or difficulty swallowing  NECK: No pain or stiffness  RESPIRATORY: No cough, wheezing, or hemoptysis; No shortness of breath  CARDIOVASCULAR: No chest pain or palpitations  GASTROINTESTINAL: No abdominal or epigastric pain; No nausea, vomiting, or hematemesis; No diarrhea or constipation; No melena or hematochezia  GENITOURINARY: No dysuria, frequency or hematuria  MUSCULOSKELETAL: No joint pain, no muscle pain, no weakness  NEUROLOGICAL: No numbness or weakness  SKIN: No itching or rashes      OBJECTIVE  PAST MEDICAL & SURGICAL HISTORY  Female bladder prolapse  Diabetes  History of repair of hip fracture    ALLERGIES:  No Known Allergies    MEDICATIONS:  STANDING MEDICATIONS  ALPRAZolam 0.5 milliGRAM(s) Oral two times a day  amiodarone    Tablet 400 milliGRAM(s) Oral three times a day  aspirin  chewable 81 milliGRAM(s) Oral daily  atorvastatin 80 milliGRAM(s) Oral daily  chlorhexidine 4% Liquid 1 Application(s) Topical <User Schedule>  dextrose 5%. 1000 milliLiter(s) IV Continuous <Continuous>  dextrose 50% Injectable 12.5 Gram(s) IV Push once  dextrose 50% Injectable 25 Gram(s) IV Push once  dextrose 50% Injectable 25 Gram(s) IV Push once  digoxin     Tablet 0.125 milliGRAM(s) Oral daily  enoxaparin Injectable 60 milliGRAM(s) SubCutaneous two times a day  insulin glargine Injectable (LANTUS) 15 Unit(s) SubCutaneous at bedtime  insulin lispro (HumaLOG) corrective regimen sliding scale   SubCutaneous three times a day before meals  insulin lispro Injectable (HumaLOG) 5 Unit(s) SubCutaneous three times a day before meals  lidocaine   Patch 2 Patch Transdermal daily  losartan 25 milliGRAM(s) Oral daily  metoprolol succinate ER 50 milliGRAM(s) Oral daily  metoprolol succinate ER 25 milliGRAM(s) Oral at bedtime  ondansetron Injectable 4 milliGRAM(s) IV Push once  pantoprazole    Tablet 40 milliGRAM(s) Oral before breakfast  spironolactone 25 milliGRAM(s) Oral daily  vancomycin    Solution 125 milliGRAM(s) Oral every 6 hours    PRN MEDICATIONS  dextrose 40% Gel 15 Gram(s) Oral once PRN  glucagon  Injectable 1 milliGRAM(s) IntraMuscular once PRN      VITAL SIGNS: Last 24 Hours  T(C): 36.1 (02 Oct 2019 12:00), Max: 36.6 (02 Oct 2019 00:00)  T(F): 97 (02 Oct 2019 12:00), Max: 97.8 (02 Oct 2019 00:00)  HR: 64 (02 Oct 2019 14:00) (62 - 80)  BP: 96/49 (02 Oct 2019 14:00) (96/49 - 125/65)  BP(mean): 67 (02 Oct 2019 14:00) (67 - 88)  RR: 18 (02 Oct 2019 14:00) (16 - 27)  SpO2: 99% (02 Oct 2019 14:00) (94% - 99%)    LABS:                        9.9    7.57  )-----------( 365      ( 02 Oct 2019 04:26 )             31.6     10-02    143  |  105  |  31<H>  ----------------------------<  164<H>  3.9   |  22  |  1.0    Ca    9.3      02 Oct 2019 04:26  Phos  2.4     10-01  Mg     2.0     10-02    TPro  6.1  /  Alb  3.4<L>  /  TBili  0.2  /  DBili  x   /  AST  30  /  ALT  25  /  AlkPhos  126<H>  10-02    PT/INR - ( 02 Oct 2019 04:26 )   PT: 13.70 sec;   INR: 1.19 ratio         PTT - ( 02 Oct 2019 04:26 )  PTT:43.0 sec              RADIOLOGY:  < from: Transthoracic Echocardiogram (10.01.19 @ 15:08) >  Summary:   1. Left ventricular ejection fraction, by visual estimation, is 25 to   30%.   2. Severely decreased global left ventricular systolic function.   3. Multiple left ventricular regional wall motion abnormalities exist.   See wall motion findings.   4. LV Ejection Fraction by Renner's Method with a biplane EF of 26 %.   5. Moderate to severely increased left ventricular internal cavity size.   6. Spectral Doppler shows restrictive pattern of left ventricular   myocardial filling (Grade III diastolic dysfunction).   7. Moderate pleural effusion in both left and right lateral regions.   8. Trivial pericardial effusion.   9. Mild mitral annular calcification.  10. Moderate to severe mitral valve regurgitation.  11. Sclerotic aortic valve with normal opening.  12. Estimated pulmonary artery systolic pressure is 53.9 mmHg assuming a   right atrial pressure of 5 mmHg, which is consistent with moderate   pulmonary hypertension.  13. LA volume Index is 36.1 ml/m² ml/m2.  14. There is mild aortic root calcification.    PHYSICIAN INTERPRETATION:  Left Ventricle: The left ventricular internal cavity size is moderate to   severely increased. Left ventricular wall thickness is normal. Global LV   systolic function was severely decreased. Left ventricular ejection   fraction, by visual estimation, is 25 to 30%. Spectral Doppler shows   restrictive pattern of left ventricular myocardial filling (Grade III   diastolic dysfunction).       LV Wall Scoring:  The entire inferior wall, mid and apical anterior septum, mid and apical  inferior septum, basal and mid inferolateral wall, and apex are akinetic.   The  basal inferoseptal segment is hypokinetic. All remaining scored segments   are  normal.    Right Ventricle: Normal right ventricular size and function.  Left Atrium: Moderately enlarged left atrium. LA volume Index is 36.1   ml/m² ml/m2.  Right Atrium: Normal right atrial size.  Pericardium: Trivial pericardial effusion is present. There is a moderate   pleural effusion in both left and right lateral regions.  Mitral Valve: Structurally normal mitral valve, with normal leaflet   excursion. The mitral valve is normal in structure. There is mild mitral   annular calcification. Moderate to severe mitral valve regurgitation is  seen.  Tricuspid Valve: Structurally normal tricuspid valve, with normal leaflet   excursion. The tricuspid valve is normal in structure. Mild tricuspid   regurgitation is visualized. Estimated pulmonary artery systolic pressure   is 53.9 mmHg assuming a right atrial pressure of 5 mmHg, which is   consistent with moderate pulmonary hypertension.  Aortic Valve: The aortic valve is trileaflet. Sclerotic aortic valve with   normal opening. Trivial aortic valve regurgitation is seen.  Pulmonic Valve: Structurally normal pulmonic valve, with normal leaflet   excursion. The pulmonic valve is normal. Mild pulmonic valve   regurgitation.  Aorta: Aortic root measured at Sinus of Valsalva is normal. There is mild   aortic root calcification.  Pulmonary Artery: The main pulmonary artery is normal in size.  Venous: The inferior vena cava was normal sized, with respiratory size   variation greater than 50%.       2D AND M-MODE MEASUREMENTS (normal ranges within parentheses):  Left                  Normal   Aorta/Left             Normal  Ventricle:                     Atrium:  IVSd (2D):  0.95 cm  (0.7-1.1) AoV Cusp       1.43  (1.5-2.6)  LVPWd (2D): 0.89 cm  (0.7-1.1) Separation:     cm  LVIDd (2D): 5.74 cm  (3.4-5.7) Left Atrium    4.14  (1.9-4.0)  LVIDs (2D): 5.01 cm            (Mmode):        cm  LV FS (2D):  12.6 %   (>25%)   Right  IVSd        0.88 cm  (0.7-1.1) Ventricle:  (Mmode):                       RVd (2D):      2.64 cm  LVPWd       0.84 cm  (0.7-1.1)  (Mmode):  LVIDd       6.27 cm  (3.4-5.7)  (Mmode):  LVIDs       5.54 cm  (Mmode):  LV FS        11.6 %   (>25%)  (Mmode):  Relative      0.31    (<0.42)  Wall  Thickness  Rel. Wall     0.27    (<0.42)  Thickness  Mm  LV Mass      140.6  Index:        g/m²  Mmode    SPECTRAL DOPPLER ANALYSIS:  LV DIASTOLIC FUNCTION:  MV Peak E: 1.27 m/s Decel Time: 132 msec  MV Peak A: 0.57 m/s  E/A Ratio: 2.24    Aortic Valve:  AoV VMax:    2.65 m/s  AoV Area, Vmax: 0.82 cm² Vmax Indx: 0.53 cm²/m²  AoV Pk Grad: 28.1 mmHg AoV Area, VTI:  0.96 cm² VTI Indx:  0.62 cm²/m²    LVOT Vmax: 0.75 m/s  LVOT VTI:  0.13 m  LVOT Diam: 1.92 cm    Mitral Valve:  MV P1/2 Time: 38.36 msec  MV Area, PHT: 5.74 cm²    Tricuspid Valve and PA/RV Systolic Pressure: TR Max Velocity: 3.50 m/s RA   Pressure: 5 mmHg RVSP/PASP: 53.9 mmHg       [id number] Sumeet Taylor M.D., Electronically signed on 10/1/2019 at 5:28:14   PM    < end of copied text >      PHYSICAL EXAM:  CONSTITUTIONAL: No acute distress, well-developed, well-groomed, AAOx3  HEAD: Atraumatic, normocephalic  EYES: EOM intact, PERRLA, conjunctiva and sclera clear  ENT: Supple, no masses, no thyromegaly, no bruits, no JVD; moist mucous membranes  PULMONARY: Clear to auscultation bilaterally; no wheezes, rales, or rhonchi  CARDIOVASCULAR: Regular rate and rhythm; no murmurs, rubs, or gallops  GASTROINTESTINAL: Soft, non-tender, non-distended; bowel sounds present  MUSCULOSKELETAL: 2+ peripheral pulses; no clubbing, no cyanosis, no edema  NEUROLOGY: non-focal  SKIN: No rashes or lesions; warm and dry    ASSESSMENT & PLAN  Patient is a 69yo female with PMHx of diabetes mellitus, paroxysmal atrial fibrillation, history of lower GI bleed and recently admitted for hip fracture who presented with acute hypoxic respiratory failure secondary to acute on chronic HFrEF s/p intubation. Found to have NSTEMI.    #Acute hypoxic respiratory failure s/p extubation, resolved  - Patient saturating well on room air  - Chest X-ray shows stable bilateral pleural effusions  - Repeat chest X-ray in AM    #NSTEMI s/p cath 9/27/2019  - Cardiology consult appreciated  - CT Surgery consult appreciated  - Heart failure consult appreciated  - CT Surgery consult appreciated  - Case discussed during cath conference today. Patient, family, and team agreed on CABG  - Scheduled for CABG on 10/7/2019  - Continue with atorvastatin 80mg PO QD  - Continue with aspirin 81mg PO QD  - Hold clopidogrel  - Discontinue metoprolol tartrate  - Start and continue metoprolol succinate 50mg PO in AM and 25mg PO in PM  - Discontinue captopril  - Start and continue losartan 25mg PO QD  - Monitor inputs and outputs, renal function, and body weights  - Keep Mg>2.2 and K>4.4    #Acute on chronic HFrEF exacerbation, resolved  - Cardiology consult appreciated  - Heart failure consult appreciated  - Continue with atorvastatin 80mg PO QD  - Continue with aspirin 81mg PO QD  - Hold clopidogrel  - Continue with spironolactone 25mg PO QD  - Discontinue metoprolol tartrate  - Start and continue metoprolol succinate 50mg PO in AM and 25mg PO in PM  - Discontinue captopril  - Start and continue losartan 25mg PO QD  - Monitor inputs and outputs, renal function, and body weights  - Keep Mg>2.2 and K>4.4  - Patient will require Lifevest prior to discharge    #Low back pain, improving  - Continue with lidocaine patches PRN    #C. diff enterocolitis, resolving  - Patient endorses only one bowel movement overnight  - C. diff PCR positive on 9/25/2019  - Continue with vancomycin 125mg PO Q6H for 14 days (Day #8)    #Diabetic ketoacidosis, resolved  - Sugars are well controlled  - Continue with insulin glargine 15 units SQ QHS  - Continue with insulin lispro 5 units SQ TID with meals  - Continue with insulin sliding scale  - Monitor finger stick glucose and BMP    #Paroxysmal atrial fibrillation with RVR, resolved  - Patient is in normal sinus rhythm  - Cardiology consult appreciated  - Continue with amiodarone 200mg PO BID  - Continue with digoxin 0.125mg PO QD  - Discontinue metoprolol tartrate  - Start and continue metoprolol succinate 50mg PO in AM and 25mg PO in PM  - Digoxin level 2.2    #Possible urinary tract infection, resolved  - Patient has indwelling Castellon catheter  - Urogynecology consult appreciated  - ID consult appreciated  - Castellon catheter replaced on 9/28/2019  - Urine culture 9/28/2019: normal linda    #Transaminitis, resolved  - Likely secondary to congestive hepatopathy  - No RUQ pain on exam  - LFTs within normal limits for past 2 days  - Monitor LFTs    #Anxiety  - Continue with Xanax 0.5mg PO Q12H PRN for anxiety    #Misc  - DVT Prophylaxis: Held prior to possible surgery  - Diet: Dysphagia 3 diet with thin liquids, DASH/TLC, consistent carbohydrates with evening snack  - GI Prophylaxis: Pantoprazole 40mg PO QD  - Activity: Increase as tolerated  - IV Fluids: Encourage PO intake  - Code Status: Full Code    Dispo: From home

## 2019-10-02 NOTE — PROGRESS NOTE ADULT - SUBJECTIVE AND OBJECTIVE BOX
GUS WILBURN  68y, Female  Allergy: No Known Allergies  Hospital Day: 11d      HPI: 67yo F with PMHx of diabetes mellitus, paroxysmal atrial fibrillation, history of lower GI bleed and recently admitted for hip fracture who presented with acute hypoxic respiratory failure secondary to acute on chronic HFrEF s/p intubation. Found to have NSTEMI.    Past 24hrs: pt was seen and examined at bedside with family present. Pt is agreeable for planned CABG and is pending transfer. pt denies any active complaints.    PMH/PSH:  Female bladder prolapse  Diabetes  History of repair of hip fracture    HOME MEDICATIONS:  aspirin 81 mg oral tablet (08-29)    VITALS:  T(F): 97.1 (10-02-19 @ 08:00), Max: 97.8 (10-02-19 @ 00:00)  HR: 66 (10-02-19 @ 10:00)  BP: 115/57 (10-02-19 @ 10:00) (99/57 - 124/64)  RR: 20 (10-02-19 @ 10:00)  SpO2: 99% (10-02-19 @ 10:00)    PHYSICAL EXAM:  GENERAL: thin female in no acute distress  NECK: No evidence of swelling no palpable lymphadenopathy  CHEST/LUNG: clear to ausculation bilaterally no wheezes, rales, or rhonchi   HEART/VASC: RRR, No murmurs, rubs, or gallops appreciated, 2+ equal bilateral radial pulse  ABDOMEN: Soft, non tender non distended +bowel sounds in all quadrants, no palpable organomegaly   EXTREMITIES:  No clubbing and range of motion intact   : montejo in place draining yellow urine    TESTS & MEASUREMENTS:  Weight (Kg): 57.2 (10-02-19 @ 10:30)      09-30-19 @ 07:01  -  10-01-19 @ 07:00  --------------------------------------------------------  IN: 1105 mL / OUT: 1385 mL / NET: -280 mL    10-01-19 @ 07:01  -  10-02-19 @ 07:00  --------------------------------------------------------  IN: 320 mL / OUT: 670 mL / NET: -350 mL                            9.9    7.57  )-----------( 365      ( 02 Oct 2019 04:26 )             31.6     PT/INR - ( 02 Oct 2019 04:26 )   PT: 13.70 sec;   INR: 1.19 ratio         PTT - ( 02 Oct 2019 04:26 )  PTT:43.0 sec  10-02    143  |  105  |  31<H>  ----------------------------<  164<H>  3.9   |  22  |  1.0    Ca    9.3      02 Oct 2019 04:26  Phos  2.4     10-01  Mg     2.0     10-02    TPro  6.1  /  Alb  3.4<L>  /  TBili  0.2  /  DBili  x   /  AST  30  /  ALT  25  /  AlkPhos  126<H>  10-02    LIVER FUNCTIONS - ( 02 Oct 2019 04:26 )  Alb: 3.4 g/dL / Pro: 6.1 g/dL / ALK PHOS: 126 U/L / ALT: 25 U/L / AST: 30 U/L / GGT: x                 Culture - Urine (collected 09-28-19 @ 10:39)  Source: .Urine Catheterized  Final Report (09-29-19 @ 14:30):    <10,000 CFU/mL Normal Urogenital Ewa            RADIOLOGY & ADDITIONAL TESTS:    MEDICATIONS:  MEDICATIONS  (STANDING):  ALPRAZolam 0.5 milliGRAM(s) Oral two times a day  amiodarone    Tablet 400 milliGRAM(s) Oral three times a day  aspirin  chewable 81 milliGRAM(s) Oral daily  atorvastatin 80 milliGRAM(s) Oral daily  chlorhexidine 4% Liquid 1 Application(s) Topical <User Schedule>  dextrose 5%. 1000 milliLiter(s) (50 mL/Hr) IV Continuous <Continuous>  dextrose 50% Injectable 12.5 Gram(s) IV Push once  dextrose 50% Injectable 25 Gram(s) IV Push once  dextrose 50% Injectable 25 Gram(s) IV Push once  digoxin     Tablet 0.125 milliGRAM(s) Oral daily  enoxaparin Injectable 60 milliGRAM(s) SubCutaneous two times a day  insulin glargine Injectable (LANTUS) 15 Unit(s) SubCutaneous at bedtime  insulin lispro (HumaLOG) corrective regimen sliding scale   SubCutaneous three times a day before meals  insulin lispro Injectable (HumaLOG) 5 Unit(s) SubCutaneous three times a day before meals  lidocaine   Patch 2 Patch Transdermal daily  metoprolol tartrate 12.5 milliGRAM(s) Oral every 6 hours  ondansetron Injectable 4 milliGRAM(s) IV Push once  pantoprazole    Tablet 40 milliGRAM(s) Oral before breakfast  spironolactone 25 milliGRAM(s) Oral daily  vancomycin    Solution 125 milliGRAM(s) Oral every 6 hours    MEDICATIONS  (PRN):  dextrose 40% Gel 15 Gram(s) Oral once PRN Blood Glucose LESS THAN 70 milliGRAM(s)/deciliter  glucagon  Injectable 1 milliGRAM(s) IntraMuscular once PRN Glucose LESS THAN 70 milligrams/deciliter

## 2019-10-02 NOTE — PROGRESS NOTE ADULT - ATTENDING COMMENTS
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 Mancia, CHF: Dr. Gandara)  Patient was presented in cath conference today and decision was made to proceed with surgical revascularization given improved medical status of the patient.   Patient ruled in for severe TVD and was found to have severely diminished EF  Hold plavix (last verify now is in < 100)  cont hep for afib, patient in SR rate controlled  cont lopr/amio/spiranolactone/ARB/dig  pt preop for CABG/MAZE/NUBIA closure  CHF improved  pt in SR in 60s  EP wants a lifevest prior to d/c set up  has evidence of mod to severe MR on TTE, EF is slightly improved from last week (20-25%)  considering cardiac MRI to assess for viability    : (Dr. Solis)  has bladder prolapse  will d/w GYN urology regarding montejo  UCx negative after montejo was replaced on 9/28/2019  h/o previous UTI with EColi and enterococcus, treated with cipro    ID: (Dr. Bourne)  h/o C diff on this admission  as per ID, pt abx d/c'd and isolation d/c'd  need ID clearance for CABG    GI:  cdiff resolved  h/o constipation from opiods after hip surgery    Psych:  pt has a h/o benzo use/dependency?    Workup summary:  CT Angio Abdomen and Pelvis w/ IV Cont (08.29.19 @ 17:18) >  Cholelithiasis in a distended gallbladder. Subcentimeter   right hepatic hypodensity to small to characterize.  Calcified splenic aneurysm measuring 1.4 cm (series 7, image 24).  1.5 cm indeterminate hypoattenuating left renal lesion (series 7   image 43) measuring higher than simple fluid in attenuation even on   noncontrast images. Additional hypodensities too small to characterize.   Bilateral extrarenal pelves. No hydronephrosis.  Status post left intramedullary nail for   intertrochanteric fracture. Fracture line remains visible.  Impression:  1.  No CTA evidence of acute gastrointestinal hemorrhage.  2.  Markedly distended urinary bladder. Correlate for urinary retention.  3.  Indeterminate left upper pole renal lesion. Further evaluation with   contrast-enhanced MRI on a nonemergent basis recommended.  4.  Status post fixation of left femoral fracture. Fracture line remains   visible.    Transthoracic Echocardiogram (08.02.19 @ 09:11) >  LVEF of 55-60%, mild MAC, mild AS, peak gradient of 27mm Hg, mean 10.9mm Hg, JENNIFER 1.34cm2 c/w moderate AS    Transthoracic Echocardiogram (09.22.19 @ 06:53) >  LVEF of < 20% by visual inspection, (18% by Renner's Method with a biplane EF of 18 %)  The mitral valve leaflets are tethered due to reduced systolic function and elevated LVDP.  Moderate-to-severe mitral regurgitation.  AoV mean gradient of 10mm Hg, RVSP of 37.5mm Hg, bilateral pleural effusions.    10/1/2019:  Carotid duplex:  Impression:   20-39% stenosis of the right internal carotid artery.   20-39% stenosis of the left internal carotid artery.     10/1/2019 TTE  EF 25-30% (by Renner's Method with a biplane EF of 26 %), mod pleural effusion (L>R), mod to severe MR, RVSP of 53.9mm Hg

## 2019-10-02 NOTE — CHART NOTE - NSCHARTNOTEFT_GEN_A_CORE
Case was discussed in cath conference this am and unanimous consensus was that surgery albeit high risk was the best option. start amiodarone and stop Plavix(verify now was 46). also gentle mobilizing and improving nutritional status would help in recovery. will observe closely and probably perform surgery early next week. all consultants, patients and family are agreeable.

## 2019-10-03 LAB
ANION GAP SERPL CALC-SCNC: 12 MMOL/L — SIGNIFICANT CHANGE UP (ref 7–14)
BUN SERPL-MCNC: 41 MG/DL — HIGH (ref 10–20)
CALCIUM SERPL-MCNC: 9.1 MG/DL — SIGNIFICANT CHANGE UP (ref 8.5–10.1)
CHLORIDE SERPL-SCNC: 105 MMOL/L — SIGNIFICANT CHANGE UP (ref 98–110)
CO2 SERPL-SCNC: 24 MMOL/L — SIGNIFICANT CHANGE UP (ref 17–32)
CREAT SERPL-MCNC: 1.3 MG/DL — SIGNIFICANT CHANGE UP (ref 0.7–1.5)
GLUCOSE BLDC GLUCOMTR-MCNC: 153 MG/DL — HIGH (ref 70–99)
GLUCOSE BLDC GLUCOMTR-MCNC: 159 MG/DL — HIGH (ref 70–99)
GLUCOSE BLDC GLUCOMTR-MCNC: 178 MG/DL — HIGH (ref 70–99)
GLUCOSE BLDC GLUCOMTR-MCNC: 189 MG/DL — HIGH (ref 70–99)
GLUCOSE SERPL-MCNC: 167 MG/DL — HIGH (ref 70–99)
HCT VFR BLD CALC: 30.3 % — LOW (ref 37–47)
HCT VFR BLD CALC: 31.1 % — LOW (ref 37–47)
HGB BLD-MCNC: 9.4 G/DL — LOW (ref 12–16)
HGB BLD-MCNC: 9.4 G/DL — LOW (ref 12–16)
MAGNESIUM SERPL-MCNC: 2.1 MG/DL — SIGNIFICANT CHANGE UP (ref 1.8–2.4)
MCHC RBC-ENTMCNC: 25.5 PG — LOW (ref 27–31)
MCHC RBC-ENTMCNC: 26 PG — LOW (ref 27–31)
MCHC RBC-ENTMCNC: 30.2 G/DL — LOW (ref 32–37)
MCHC RBC-ENTMCNC: 31 G/DL — LOW (ref 32–37)
MCV RBC AUTO: 83.9 FL — SIGNIFICANT CHANGE UP (ref 81–99)
MCV RBC AUTO: 84.3 FL — SIGNIFICANT CHANGE UP (ref 81–99)
NRBC # BLD: 0 /100 WBCS — SIGNIFICANT CHANGE UP (ref 0–0)
NRBC # BLD: 0 /100 WBCS — SIGNIFICANT CHANGE UP (ref 0–0)
PLATELET # BLD AUTO: 322 K/UL — SIGNIFICANT CHANGE UP (ref 130–400)
PLATELET # BLD AUTO: 373 K/UL — SIGNIFICANT CHANGE UP (ref 130–400)
POTASSIUM SERPL-MCNC: 4.5 MMOL/L — SIGNIFICANT CHANGE UP (ref 3.5–5)
POTASSIUM SERPL-SCNC: 4.5 MMOL/L — SIGNIFICANT CHANGE UP (ref 3.5–5)
PREALB SERPL-MCNC: 16 MG/DL — LOW (ref 20–40)
RBC # BLD: 3.61 M/UL — LOW (ref 4.2–5.4)
RBC # BLD: 3.69 M/UL — LOW (ref 4.2–5.4)
RBC # FLD: 14.6 % — HIGH (ref 11.5–14.5)
RBC # FLD: 14.7 % — HIGH (ref 11.5–14.5)
SODIUM SERPL-SCNC: 141 MMOL/L — SIGNIFICANT CHANGE UP (ref 135–146)
WBC # BLD: 8.26 K/UL — SIGNIFICANT CHANGE UP (ref 4.8–10.8)
WBC # BLD: 8.68 K/UL — SIGNIFICANT CHANGE UP (ref 4.8–10.8)
WBC # FLD AUTO: 8.26 K/UL — SIGNIFICANT CHANGE UP (ref 4.8–10.8)
WBC # FLD AUTO: 8.68 K/UL — SIGNIFICANT CHANGE UP (ref 4.8–10.8)

## 2019-10-03 PROCEDURE — 99231 SBSQ HOSP IP/OBS SF/LOW 25: CPT

## 2019-10-03 PROCEDURE — 71045 X-RAY EXAM CHEST 1 VIEW: CPT | Mod: 26

## 2019-10-03 RX ORDER — ALPRAZOLAM 0.25 MG
0.5 TABLET ORAL ONCE
Refills: 0 | Status: DISCONTINUED | OUTPATIENT
Start: 2019-10-03 | End: 2019-10-03

## 2019-10-03 RX ADMIN — Medication 81 MILLIGRAM(S): at 17:36

## 2019-10-03 RX ADMIN — ENOXAPARIN SODIUM 60 MILLIGRAM(S): 100 INJECTION SUBCUTANEOUS at 17:40

## 2019-10-03 RX ADMIN — ENOXAPARIN SODIUM 60 MILLIGRAM(S): 100 INJECTION SUBCUTANEOUS at 06:21

## 2019-10-03 RX ADMIN — CHLORHEXIDINE GLUCONATE 1 APPLICATION(S): 213 SOLUTION TOPICAL at 06:21

## 2019-10-03 RX ADMIN — ATORVASTATIN CALCIUM 80 MILLIGRAM(S): 80 TABLET, FILM COATED ORAL at 21:05

## 2019-10-03 RX ADMIN — Medication 1: at 07:40

## 2019-10-03 RX ADMIN — Medication 125 MILLIGRAM(S): at 06:20

## 2019-10-03 RX ADMIN — Medication 125 MILLIGRAM(S): at 17:37

## 2019-10-03 RX ADMIN — AMIODARONE HYDROCHLORIDE 400 MILLIGRAM(S): 400 TABLET ORAL at 17:37

## 2019-10-03 RX ADMIN — Medication 125 MILLIGRAM(S): at 17:40

## 2019-10-03 RX ADMIN — LIDOCAINE 2 PATCH: 4 CREAM TOPICAL at 19:19

## 2019-10-03 RX ADMIN — Medication 5 UNIT(S): at 12:00

## 2019-10-03 RX ADMIN — LIDOCAINE 2 PATCH: 4 CREAM TOPICAL at 17:36

## 2019-10-03 RX ADMIN — Medication 3 MILLIGRAM(S): at 21:05

## 2019-10-03 RX ADMIN — Medication 1: at 17:38

## 2019-10-03 RX ADMIN — LOSARTAN POTASSIUM 25 MILLIGRAM(S): 100 TABLET, FILM COATED ORAL at 06:38

## 2019-10-03 RX ADMIN — AMIODARONE HYDROCHLORIDE 400 MILLIGRAM(S): 400 TABLET ORAL at 06:20

## 2019-10-03 RX ADMIN — Medication 5 UNIT(S): at 07:40

## 2019-10-03 RX ADMIN — PANTOPRAZOLE SODIUM 40 MILLIGRAM(S): 20 TABLET, DELAYED RELEASE ORAL at 06:20

## 2019-10-03 RX ADMIN — Medication 5 UNIT(S): at 16:00

## 2019-10-03 RX ADMIN — INSULIN GLARGINE 15 UNIT(S): 100 INJECTION, SOLUTION SUBCUTANEOUS at 22:30

## 2019-10-03 RX ADMIN — Medication 125 MILLIGRAM(S): at 00:22

## 2019-10-03 RX ADMIN — Medication 0.5 MILLIGRAM(S): at 14:04

## 2019-10-03 RX ADMIN — AMIODARONE HYDROCHLORIDE 400 MILLIGRAM(S): 400 TABLET ORAL at 21:05

## 2019-10-03 RX ADMIN — Medication 0.5 MILLIGRAM(S): at 21:05

## 2019-10-03 RX ADMIN — SPIRONOLACTONE 25 MILLIGRAM(S): 25 TABLET, FILM COATED ORAL at 06:20

## 2019-10-03 RX ADMIN — Medication 0.12 MILLIGRAM(S): at 21:05

## 2019-10-03 RX ADMIN — Medication 25 MILLIGRAM(S): at 21:05

## 2019-10-03 RX ADMIN — Medication 1: at 12:00

## 2019-10-03 NOTE — PHYSICAL THERAPY INITIAL EVALUATION ADULT - IMPAIRMENTS CONTRIBUTING TO GAIT DEVIATIONS, PT EVAL
impaired postural control/decreased endurance, requiring multiple 10 second standing rest breaks secondary to fatigue.

## 2019-10-03 NOTE — CHART NOTE - NSCHARTNOTEFT_GEN_A_CORE
Registered Dietitian Follow-Up     Patient Profile Reviewed                           Yes [x]   No []     Nutrition History Previously Obtained        Yes [x]  No []       Pertinent Subjective Information: Pt reports she is receiving and drinking glucerna shakes. She drank the entire bottle this AM. Encouraged pt to cont drinking shakes. Documented po intake 10% per EMR.      Pertinent Medical Interventions: Transfer from CCU. Awaiting CABG. has evidence of mod to severe MR on TTE, EF is slightly improved from last week (20-25%). CHF improved. C.diff resolved.      Diet order: Dysphagia 3 soft thin liquids, DASH/TLC, consistent carb (evening snack) glucerna BID, prosource gel SF daily     Anthropometrics:  - Ht. 160cm  - Wt. 57.3 kg 10/3 vs. 58.8kg on 9/29 vs. 58.7kg on 9/26- relatively stable   - %wt change  - BMI 23.0  - IBW     Pertinent Lab Data: (10/3) H/H 9.4/31.1 BUN 41  glucose 167       Pertinent Meds: Glargine, Lispro, Metoprolol, Zofran, Protonix, Atorvastatin, Aldactone      Physical Findings:  - Appearance: alert  - GI function: LBM 10/3   - Tubes:  - Oral/Mouth cavity: Tolerating soft cut up   - Skin: BS 19 ecchymosis. L upper sacrum/lower back abrasion      Nutrition Requirements (from RD note on 9/23)   Weight Used: 58.7kg- lowest doc weight      Estimated Energy Needs    Continue [x]  Adjust [] 1315-1425kcal (MSJ x 1.2-1.3 AF)  Adjusted Energy Recommendations:   kcal/day        Estimated Protein Needs    Continue [x]  Adjust [] 59-71g (1-1.2g/kg CBW  Adjusted Protein Recommendations:   gm/day        Estimated Fluid Needs        Continue [x]  Adjust [] per CCU team   Adjusted Fluid Recommendations:   mL/day       Nutrient Intake: not meeting needs, improving         [] Previous Nutrition Diagnosis: Inadequate protein energy intake            [x] Ongoing          [] Resolved    [] No active nutrition diagnosis identified at this time       Nutrition Intervention meal/snack, med food supplements      Goal/Expected Outcome: Pt to consume >75% of meal tray in 4 days      Indicator/Monitoring: RD to monitor energy intake, diet order, body comp, NFPF, glucose profile.     Recommendation: Consider d/c prosource gel as pt dislikes. Cont Dysphagia 3 soft thin liquids, DASH/TLC, consistent carb (evening snack) glucerna BID

## 2019-10-03 NOTE — PROGRESS NOTE ADULT - ATTENDING COMMENTS
68F with PMHx of uncontrolled DM (non-compliant) (Hg A1C from Aug 30th, 8.5%), ischemic cardiomyopathy, NSTEMI, severe CAD with LM/TVD, mitral regurgitation/aortic stenosis, h/o familial heart disease (fatehr  had heart attack in 50s), female uterine prolapse (3 weeks of indwelling montejo for urinary retention), paroxysmal Afib, recent labial abscess s/p ID, hip fracture s/p fixation, recent LGIB bleed (hemorrhoids)(wasn't scoped)    CV: (1' Cards: Dr. Mancia, CHF: Dr. Gandara)  Patient was presented in cath conference today and decision was made to proceed with surgical revascularization given improved medical status of the patient  Hold plavix  cont hep gtt for afib, patient in SR rate controlled  cont lopr/amio/spiranolactone/ARB/dig  pt preop for CABG/MAZE/NUBIA closure  CHF improved  pt in SR in 60s  EP wants a lifevest prior to d/c set up  has evidence of mod to severe MR on TTE, EF is slightly improved from last week (20-25%)  considering cardiac MRI to assess for viability    /GYN: (Dr. Nadeen Rodriguez   has uterine prolapse  will d/w GYN urology regarding montejo  UCx negative after montejo was replaced on 9/28/2019  h/o previous UTI with EColi and enterococcus, treated with cipro    ID: (Dr. Bourne)  h/o C diff on this admission  as per ID, pt abx d/c'd and isolation d/c'd    GI:  c. diff resolved  h/o constipation from opioids after hip surgery    Psych:  pt has a h/o benzo use/dependency?    Workup summary:  CT Angio Abdomen and Pelvis w/ IV Cont (08.29.19 @ 17:18) >  Cholelithiasis in a distended gallbladder. Subcentimeter   right hepatic hypodensity to small to characterize.  Calcified splenic aneurysm measuring 1.4 cm (series 7, image 24).  1.5 cm indeterminate hypoattenuating left renal lesion (series 7   image 43) measuring higher than simple fluid in attenuation even on   noncontrast images. Additional hypodensities too small to characterize.   Bilateral extrarenal pelves. No hydronephrosis.  Status post left intramedullary nail for   intertrochanteric fracture. Fracture line remains visible.  Impression:  1.  No CTA evidence of acute gastrointestinal hemorrhage.  2.  Markedly distended urinary bladder. Correlate for urinary retention.  3.  Indeterminate left upper pole renal lesion. Further evaluation with   contrast-enhanced MRI on a nonemergent basis recommended.  4.  Status post fixation of left femoral fracture. Fracture line remains   visible.    Transthoracic Echocardiogram (08.02.19 @ 09:11) >  LVEF of 55-60%, mild MAC, mild AS, peak gradient of 27mm Hg, mean 10.9mm Hg, JENNIFER 1.34cm2 c/w moderate AS    Transthoracic Echocardiogram (09.22.19 @ 06:53) >  LVEF of < 20% by visual inspection, (18% by Renner's Method with a biplane EF of 18 %)  The mitral valve leaflets are tethered due to reduced systolic function and elevated LVDP.  Moderate-to-severe mitral regurgitation.  AoV mean gradient of 10mm Hg, RVSP of 37.5mm Hg, bilateral pleural effusions.    10/1/2019:  Carotid duplex:  Impression:   20-39% stenosis of the right internal carotid artery.   20-39% stenosis of the left internal carotid artery.     10/1/2019 TTE  EF 25-30% (by Renner's Method with a biplane EF of 26 %), mod pleural effusion (L>R), mod to severe MR, RVSP of 53.9mm Hg

## 2019-10-03 NOTE — PROGRESS NOTE ADULT - SUBJECTIVE AND OBJECTIVE BOX
SUBJ:  No new complains    MEDICATIONS  (STANDING):  ALPRAZolam 0.5 milliGRAM(s) Oral two times a day  amiodarone    Tablet 400 milliGRAM(s) Oral three times a day  aspirin  chewable 81 milliGRAM(s) Oral daily  atorvastatin 80 milliGRAM(s) Oral daily  chlorhexidine 4% Liquid 1 Application(s) Topical <User Schedule>  dextrose 5%. 1000 milliLiter(s) (50 mL/Hr) IV Continuous <Continuous>  dextrose 50% Injectable 12.5 Gram(s) IV Push once  dextrose 50% Injectable 25 Gram(s) IV Push once  dextrose 50% Injectable 25 Gram(s) IV Push once  digoxin     Tablet 0.125 milliGRAM(s) Oral daily  enoxaparin Injectable 60 milliGRAM(s) SubCutaneous two times a day  insulin glargine Injectable (LANTUS) 15 Unit(s) SubCutaneous at bedtime  insulin lispro (HumaLOG) corrective regimen sliding scale   SubCutaneous three times a day before meals  insulin lispro Injectable (HumaLOG) 5 Unit(s) SubCutaneous three times a day before meals  lidocaine   Patch 2 Patch Transdermal daily  losartan 25 milliGRAM(s) Oral daily  melatonin 3 milliGRAM(s) Oral at bedtime  metoprolol succinate ER 50 milliGRAM(s) Oral daily  metoprolol succinate ER 25 milliGRAM(s) Oral at bedtime  ondansetron Injectable 4 milliGRAM(s) IV Push once  pantoprazole    Tablet 40 milliGRAM(s) Oral before breakfast  spironolactone 25 milliGRAM(s) Oral daily  vancomycin    Solution 125 milliGRAM(s) Oral every 6 hours    MEDICATIONS  (PRN):  dextrose 40% Gel 15 Gram(s) Oral once PRN Blood Glucose LESS THAN 70 milliGRAM(s)/deciliter  glucagon  Injectable 1 milliGRAM(s) IntraMuscular once PRN Glucose LESS THAN 70 milligrams/deciliter            Vital Signs Last 24 Hrs  T(C): 36.4 (03 Oct 2019 04:00), Max: 36.4 (03 Oct 2019 04:00)  T(F): 97.5 (03 Oct 2019 04:00), Max: 97.5 (03 Oct 2019 04:00)  HR: 60 (03 Oct 2019 08:00) (55 - 70)  BP: 125/60 (03 Oct 2019 08:00) (96/49 - 125/65)  BP(mean): 86 (03 Oct 2019 08:00) (67 - 86)  RR: 21 (03 Oct 2019 08:00) (13 - 29)  SpO2: 99% (03 Oct 2019 08:00) (95% - 100%)     REVIEW OF SYSTEMS:  CONSTITUTIONAL: No fever, weight loss, or fatigue  CARDIOLOGY: PAtient denies chest pain, shortness of breath or syncopal episodes.   RESPIRATORY: denies shortness of breath, wheezeing.   NEUROLOGICAL: NO weakness, no focal deficits to report.  ENDOCRINOLOGICAL: no recent change in diabetic medications.   GI: no BRBPR, no N,V,diarrhea.    PSYCHIATRY: normal mood and affect  HEENT: no nasal discharge, no ecchymosis  SKIN: no ecchymosis, no breakdown  MUSCULOSKELETAL: Full range of motion x4.        PHYSICAL EXAM:  · CONSTITUTIONAL:	Well-developed, well nourished    BMI-  ·RESPIRATORY:   airway patent; breath sounds equal; good air movement; respirations non-labored; clear to auscultation bilaterally; no chest wall tenderness; no intercostal retractions; no rales,rhonchi or wheeze  · CARDIOVASCULAR	regular rate and rhythm  no rub  no murmur  normal PMI  · EXTREMITIES: No cyanosis, clubbing or edema  · VASCULAR: 	Equal and normal pulses (carotid, femoral, dorsalis pedis)  	  TELEMETRY:    ECG:    TTE:    LABS:                        9.4    8.26  )-----------( 373      ( 03 Oct 2019 04:00 )             31.1     10-03    141  |  105  |  41<H>  ----------------------------<  167<H>  4.5   |  24  |  1.3    Ca    9.1      03 Oct 2019 04:00  Mg     2.1     10-03    TPro  6.1  /  Alb  3.4<L>  /  TBili  0.2  /  DBili  x   /  AST  30  /  ALT  25  /  AlkPhos  126<H>  10-02        PT/INR - ( 02 Oct 2019 04:26 )   PT: 13.70 sec;   INR: 1.19 ratio         PTT - ( 02 Oct 2019 04:26 )  PTT:43.0 sec    I&O's Summary    02 Oct 2019 07:01  -  03 Oct 2019 07:00  --------------------------------------------------------  IN: 620 mL / OUT: 825 mL / NET: -205 mL      BNP  RADIOLOGY & ADDITIONAL STUDIES:    IMPRESSION AND PLAN:    Patient awaiting CABG  off clopidrogel  OOB to chair and ambulate

## 2019-10-03 NOTE — PHYSICAL THERAPY INITIAL EVALUATION ADULT - PHYSICAL ASSIST/NONPHYSICAL ASSIST: GAIT, REHAB EVAL
1 person assist/1 person assist to manage equipment. Cues for posture./verbal cues/nonverbal cues (demo/gestures)

## 2019-10-03 NOTE — PROGRESS NOTE ADULT - SUBJECTIVE AND OBJECTIVE BOX
PLANNED OPERATIVE PROCEDURE(s):                HD #                       SURGEON(s): CHINA Escobar  SUBJECTIVE ASSESSMENT:68y Female patient seen and examined at bedside.    Vital Signs Last 24 Hrs  T(F): 97.5 (03 Oct 2019 04:00), Max: 97.5 (03 Oct 2019 04:00)  HR: 60 (03 Oct 2019 08:00) (55 - 70)  BP: 125/60 (03 Oct 2019 08:00) (96/49 - 125/65)  BP(mean): 86 (03 Oct 2019 08:00) (67 - 86)  RR: 21 (03 Oct 2019 08:00) (13 - 29)  SpO2: 99% (03 Oct 2019 08:00) (95% - 100%)      I&O's Detail    02 Oct 2019 07:01  -  03 Oct 2019 07:00  --------------------------------------------------------  IN:    Oral Fluid: 620 mL  Total IN: 620 mL    OUT:    Indwelling Catheter - Urethral: 825 mL  Total OUT: 825 mL        Net: I&O's Detail    01 Oct 2019 07:01  -  02 Oct 2019 07:00  --------------------------------------------------------  Total NET: -350 mL      02 Oct 2019 07:01  -  03 Oct 2019 07:00  --------------------------------------------------------  Total NET: -205 mL        CAPILLARY BLOOD GLUCOSE      POCT Blood Glucose.: 153 mg/dL (03 Oct 2019 07:11)  POCT Blood Glucose.: 238 mg/dL (02 Oct 2019 20:57)  POCT Blood Glucose.: 228 mg/dL (02 Oct 2019 16:30)  POCT Blood Glucose.: 224 mg/dL (02 Oct 2019 12:27)      Physical Exam:  General: NAD; A&Ox3  Cardiac: S1/S2, RRR, no murmur, no rubs  Lungs: unlabored respirations, CTA b/l, no wheeze, no rales, no crackles  Abdomen: Soft/NT/ND; positive bowel sounds x 4  Extremities: No edema b/l lower extremities; good capillary refill; no cyanosis; palpable 1+ pedal pulses b/l    Central Venous Catheter: Yes[]  No[] , If Yes indication:                    Day #  [place] Catheter: Yes  [] , No  [] , If yes indication:                                 Day #  BOWEL MOVEMENT:  [] YES [] NO, If No, Timing since last BM Day #        LABS:                        9.4<L>  8.26  )-----------( 373      ( 03 Oct 2019 04:00 )             31.1<L>                        9.9<L>  7.57  )-----------( 365      ( 02 Oct 2019 04:26 )             31.6<L>    10-03    141  |  105  |  41<H>  ----------------------------<  167<H>  4.5   |  24  |  1.3  10-02    143  |  105  |  31<H>  ----------------------------<  164<H>  3.9   |  22  |  1.0    Ca    9.1      03 Oct 2019 04:00  Phos  2.4     10-01  Mg     2.1     10-03    TPro  6.1 [6.0 - 8.0]  /  Alb  3.4<L> [3.5 - 5.2]  /  TBili  0.2 [0.2 - 1.2]  /  DBili  x   /  AST  30 [0 - 41]  /  ALT  25 [0 - 41]  /  AlkPhos  126<H> [30 - 115]  10-02    PT/INR - ( 02 Oct 2019 04:26 )   PT: ;   INR: 1.19 ratio         PTT - ( 02 Oct 2019 04:26 )  PTT:43.0 sec      RADIOLOGY & ADDITIONAL TESTS:  CXR:   EKG:  Allergies    No Known Allergies    Intolerances      MEDICATIONS  (STANDING):  ALPRAZolam 0.5 milliGRAM(s) Oral two times a day  amiodarone    Tablet 400 milliGRAM(s) Oral three times a day  aspirin  chewable 81 milliGRAM(s) Oral daily  atorvastatin 80 milliGRAM(s) Oral daily  chlorhexidine 4% Liquid 1 Application(s) Topical <User Schedule>  dextrose 5%. 1000 milliLiter(s) (50 mL/Hr) IV Continuous <Continuous>  dextrose 50% Injectable 12.5 Gram(s) IV Push once  dextrose 50% Injectable 25 Gram(s) IV Push once  dextrose 50% Injectable 25 Gram(s) IV Push once  digoxin     Tablet 0.125 milliGRAM(s) Oral daily  enoxaparin Injectable 60 milliGRAM(s) SubCutaneous two times a day  insulin glargine Injectable (LANTUS) 15 Unit(s) SubCutaneous at bedtime  insulin lispro (HumaLOG) corrective regimen sliding scale   SubCutaneous three times a day before meals  insulin lispro Injectable (HumaLOG) 5 Unit(s) SubCutaneous three times a day before meals  lidocaine   Patch 2 Patch Transdermal daily  losartan 25 milliGRAM(s) Oral daily  melatonin 3 milliGRAM(s) Oral at bedtime  metoprolol succinate ER 50 milliGRAM(s) Oral daily  metoprolol succinate ER 25 milliGRAM(s) Oral at bedtime  ondansetron Injectable 4 milliGRAM(s) IV Push once  pantoprazole    Tablet 40 milliGRAM(s) Oral before breakfast  spironolactone 25 milliGRAM(s) Oral daily  vancomycin    Solution 125 milliGRAM(s) Oral every 6 hours    MEDICATIONS  (PRN):  dextrose 40% Gel 15 Gram(s) Oral once PRN Blood Glucose LESS THAN 70 milliGRAM(s)/deciliter  glucagon  Injectable 1 milliGRAM(s) IntraMuscular once PRN Glucose LESS THAN 70 milligrams/deciliter      Pharmacologic DVT Prophylaxis: [] YES, []NO: Contraindication:   [] HEPARIN: Dose: XX mg  Q24H    [] LOVENOX: Dose: XX mg  Q24H                 SCD's: YESb/l    GI Prophylaxis: Protonix [], Pepcid []    Pre-op ACEi/ARB/CCB held 24 hours prior to planned procedure: [] Yes, [] NO: indication:  Pre-Op Beta-Blockers: []Yes, []No: contraindication:  Pre-Op Aspirin: []Yes,  []No: contraindication: [] Held for Pre-op cardiac valve surgery with no CAD  Pre-Op Statin: []Yes, []No: contraindication:      Ambulation/Activity Status:    Assessment/Plan:  68y Female Pre-op for   - Case and plan discussed with CTU Intensivist and CT Surgeon - Dr. Urbano/Flaco/Shawn   - Continue CTU supportive care and ongoing plan of care as per continuing CTU rounds.    - Continue DVT/GI prophylaxis  - Incentive Spirometry 10 times an hour  - Continue to advance physical activity as tolerated and continue PT/OT as directed  1. CAD: Continue ASA, statin, BB  2. HTN:   3. A. Fib:   4. COPD/Hypoxia:   5. DM/Glucose Control:     Social Service Disposition: PLANNED OPERATIVE PROCEDURE(s):     Pre-op CABG           HD #                       SURGEON(s): CHINA Escobar  SUBJECTIVE ASSESSMENT:68y Female patient seen and examined at bedside. no acute complaints at this time.    Vital Signs Last 24 Hrs  T(F): 97.5 (03 Oct 2019 04:00), Max: 97.5 (03 Oct 2019 04:00)  HR: 60 (03 Oct 2019 08:00) (55 - 70)  BP: 125/60 (03 Oct 2019 08:00) (96/49 - 125/65)  BP(mean): 86 (03 Oct 2019 08:00) (67 - 86)  RR: 21 (03 Oct 2019 08:00) (13 - 29)  SpO2: 99% (03 Oct 2019 08:00) (95% - 100%)      I&O's Detail    02 Oct 2019 07:01  -  03 Oct 2019 07:00  --------------------------------------------------------  IN:    Oral Fluid: 620 mL  Total IN: 620 mL    OUT:    Indwelling Catheter - Urethral: 825 mL  Total OUT: 825 mL        Net: I&O's Detail    01 Oct 2019 07:01  -  02 Oct 2019 07:00  --------------------------------------------------------  Total NET: -350 mL      02 Oct 2019 07:01  -  03 Oct 2019 07:00  --------------------------------------------------------  Total NET: -205 mL        CAPILLARY BLOOD GLUCOSE      POCT Blood Glucose.: 153 mg/dL (03 Oct 2019 07:11)  POCT Blood Glucose.: 238 mg/dL (02 Oct 2019 20:57)  POCT Blood Glucose.: 228 mg/dL (02 Oct 2019 16:30)  POCT Blood Glucose.: 224 mg/dL (02 Oct 2019 12:27)      Physical Exam:  General: NAD; A&Ox3  Cardiac: S1/S2, RRR, no murmur, no rubs  Lungs: unlabored respirations, CTA b/l, no wheeze, no rales, no crackles  Abdomen: Soft/NT/ND; positive bowel sounds x 4  Extremities: No edema b/l lower extremities; good capillary refill; no cyanosis; palpable 1+ pedal pulses b/l    Central Venous Catheter: Yes[]  No[x] , If Yes indication:                    Day #  [place] Catheter: Yes  [x] , No  [] , If yes indication:  urinary retention, neurogenic bladder                               Day #5  BOWEL MOVEMENT:  [x] YES [] NO, If No, Timing since last BM Day #        LABS:                        9.4<L>  8.26  )-----------( 373      ( 03 Oct 2019 04:00 )             31.1<L>                        9.9<L>  7.57  )-----------( 365      ( 02 Oct 2019 04:26 )             31.6<L>    10-03    141  |  105  |  41<H>  ----------------------------<  167<H>  4.5   |  24  |  1.3  10-02    143  |  105  |  31<H>  ----------------------------<  164<H>  3.9   |  22  |  1.0    Ca    9.1      03 Oct 2019 04:00  Phos  2.4     10-01  Mg     2.1     10-03    TPro  6.1 [6.0 - 8.0]  /  Alb  3.4<L> [3.5 - 5.2]  /  TBili  0.2 [0.2 - 1.2]  /  DBili  x   /  AST  30 [0 - 41]  /  ALT  25 [0 - 41]  /  AlkPhos  126<H> [30 - 115]  10-02    PT/INR - ( 02 Oct 2019 04:26 )   PT: ;   INR: 1.19 ratio         PTT - ( 02 Oct 2019 04:26 )  PTT:43.0 sec      RADIOLOGY & ADDITIONAL TESTS:  CXR: < from: Xray Chest 1 View- PORTABLE-Routine (09.30.19 @ 05:41) >  Impression:      Stablebilateral pleural effusion/opacities.    < end of copied text >    EKG:  Allergies    No Known Allergies    Intolerances      MEDICATIONS  (STANDING):  ALPRAZolam 0.5 milliGRAM(s) Oral two times a day  amiodarone    Tablet 400 milliGRAM(s) Oral three times a day  aspirin  chewable 81 milliGRAM(s) Oral daily  atorvastatin 80 milliGRAM(s) Oral daily  chlorhexidine 4% Liquid 1 Application(s) Topical <User Schedule>  dextrose 5%. 1000 milliLiter(s) (50 mL/Hr) IV Continuous <Continuous>  dextrose 50% Injectable 12.5 Gram(s) IV Push once  dextrose 50% Injectable 25 Gram(s) IV Push once  dextrose 50% Injectable 25 Gram(s) IV Push once  digoxin     Tablet 0.125 milliGRAM(s) Oral daily  enoxaparin Injectable 60 milliGRAM(s) SubCutaneous two times a day  insulin glargine Injectable (LANTUS) 15 Unit(s) SubCutaneous at bedtime  insulin lispro (HumaLOG) corrective regimen sliding scale   SubCutaneous three times a day before meals  insulin lispro Injectable (HumaLOG) 5 Unit(s) SubCutaneous three times a day before meals  lidocaine   Patch 2 Patch Transdermal daily  losartan 25 milliGRAM(s) Oral daily  melatonin 3 milliGRAM(s) Oral at bedtime  metoprolol succinate ER 50 milliGRAM(s) Oral daily  metoprolol succinate ER 25 milliGRAM(s) Oral at bedtime  ondansetron Injectable 4 milliGRAM(s) IV Push once  pantoprazole    Tablet 40 milliGRAM(s) Oral before breakfast  spironolactone 25 milliGRAM(s) Oral daily  vancomycin    Solution 125 milliGRAM(s) Oral every 6 hours    MEDICATIONS  (PRN):  dextrose 40% Gel 15 Gram(s) Oral once PRN Blood Glucose LESS THAN 70 milliGRAM(s)/deciliter  glucagon  Injectable 1 milliGRAM(s) IntraMuscular once PRN Glucose LESS THAN 70 milligrams/deciliter      Pharmacologic DVT Prophylaxis: [] YES, []NO: Contraindication:   [] HEPARIN: Dose: XX mg  Q24H    [x] LOVENOX: Dose: 60 mg  Q12H                 SCD's: YESb/l    GI Prophylaxis: Protonix [x], Pepcid []    Pre-op ACEi/ARB/CCB held 24 hours prior to planned procedure: [x] Yes, [] NO: indication: htn  Pre-Op Beta-Blockers: [x]Yes, []No: contraindication:  Pre-Op Aspirin: [x]Yes,  []No: contraindication: [] Held for Pre-op cardiac valve surgery with no CAD  Pre-Op Statin: [x]Yes, []No: contraindication:      Ambulation/Activity Status: ambulate     Assessment/Plan:  68y Female Pre-op for CABG  - Case and plan discussed with CTU Intensivist and CT Surgeon - Dr. Urbano/Flaco/Shawn   - Continue CTU supportive care and ongoing plan of care as per continuing CTU rounds.    - Continue DVT/GI prophylaxis  - Incentive Spirometry 10 times an hour  - Continue to advance physical activity as tolerated and continue PT/OT as directed  1. CAD: Continue ASA, statin, BB  2. HTN: cont losartan,   3. A. Fib: cont amio, bb  4. CHF: cont spironolactone  5. DM/Glucose Control: cont lantus 15, humalog 5, insulin sliding scale

## 2019-10-04 LAB
ALBUMIN SERPL ELPH-MCNC: 3.4 G/DL — LOW (ref 3.5–5.2)
ALP SERPL-CCNC: 126 U/L — HIGH (ref 30–115)
ALT FLD-CCNC: 20 U/L — SIGNIFICANT CHANGE UP (ref 0–41)
ANION GAP SERPL CALC-SCNC: 11 MMOL/L — SIGNIFICANT CHANGE UP (ref 7–14)
AST SERPL-CCNC: 17 U/L — SIGNIFICANT CHANGE UP (ref 0–41)
BILIRUB SERPL-MCNC: 0.3 MG/DL — SIGNIFICANT CHANGE UP (ref 0.2–1.2)
BUN SERPL-MCNC: 48 MG/DL — HIGH (ref 10–20)
CALCIUM SERPL-MCNC: 9.3 MG/DL — SIGNIFICANT CHANGE UP (ref 8.5–10.1)
CHLORIDE SERPL-SCNC: 102 MMOL/L — SIGNIFICANT CHANGE UP (ref 98–110)
CO2 SERPL-SCNC: 25 MMOL/L — SIGNIFICANT CHANGE UP (ref 17–32)
CREAT SERPL-MCNC: 1.5 MG/DL — SIGNIFICANT CHANGE UP (ref 0.7–1.5)
GLUCOSE BLDC GLUCOMTR-MCNC: 163 MG/DL — HIGH (ref 70–99)
GLUCOSE BLDC GLUCOMTR-MCNC: 180 MG/DL — HIGH (ref 70–99)
GLUCOSE BLDC GLUCOMTR-MCNC: 204 MG/DL — HIGH (ref 70–99)
GLUCOSE BLDC GLUCOMTR-MCNC: 28 MG/DL — CRITICAL LOW (ref 70–99)
GLUCOSE BLDC GLUCOMTR-MCNC: 298 MG/DL — HIGH (ref 70–99)
GLUCOSE SERPL-MCNC: 147 MG/DL — HIGH (ref 70–99)
MAGNESIUM SERPL-MCNC: 2.2 MG/DL — SIGNIFICANT CHANGE UP (ref 1.8–2.4)
POTASSIUM SERPL-MCNC: 4.7 MMOL/L — SIGNIFICANT CHANGE UP (ref 3.5–5)
POTASSIUM SERPL-SCNC: 4.7 MMOL/L — SIGNIFICANT CHANGE UP (ref 3.5–5)
PROT SERPL-MCNC: 6.2 G/DL — SIGNIFICANT CHANGE UP (ref 6–8)
SODIUM SERPL-SCNC: 138 MMOL/L — SIGNIFICANT CHANGE UP (ref 135–146)

## 2019-10-04 PROCEDURE — 99231 SBSQ HOSP IP/OBS SF/LOW 25: CPT

## 2019-10-04 PROCEDURE — 71045 X-RAY EXAM CHEST 1 VIEW: CPT | Mod: 26

## 2019-10-04 RX ORDER — AMIODARONE HYDROCHLORIDE 400 MG/1
200 TABLET ORAL DAILY
Refills: 0 | Status: DISCONTINUED | OUTPATIENT
Start: 2019-10-04 | End: 2019-10-07

## 2019-10-04 RX ORDER — ACETAMINOPHEN 500 MG
650 TABLET ORAL ONCE
Refills: 0 | Status: COMPLETED | OUTPATIENT
Start: 2019-10-04 | End: 2019-10-04

## 2019-10-04 RX ORDER — METOPROLOL TARTRATE 50 MG
25 TABLET ORAL
Refills: 0 | Status: DISCONTINUED | OUTPATIENT
Start: 2019-10-04 | End: 2019-10-06

## 2019-10-04 RX ORDER — TEMAZEPAM 15 MG/1
15 CAPSULE ORAL AT BEDTIME
Refills: 0 | Status: DISCONTINUED | OUTPATIENT
Start: 2019-10-04 | End: 2019-10-07

## 2019-10-04 RX ORDER — INSULIN GLARGINE 100 [IU]/ML
25 INJECTION, SOLUTION SUBCUTANEOUS AT BEDTIME
Refills: 0 | Status: DISCONTINUED | OUTPATIENT
Start: 2019-10-04 | End: 2019-10-05

## 2019-10-04 RX ADMIN — Medication 25 MILLIGRAM(S): at 18:50

## 2019-10-04 RX ADMIN — PANTOPRAZOLE SODIUM 40 MILLIGRAM(S): 20 TABLET, DELAYED RELEASE ORAL at 06:27

## 2019-10-04 RX ADMIN — Medication 1: at 08:00

## 2019-10-04 RX ADMIN — Medication 3 MILLIGRAM(S): at 21:53

## 2019-10-04 RX ADMIN — Medication 5 UNIT(S): at 17:00

## 2019-10-04 RX ADMIN — TEMAZEPAM 15 MILLIGRAM(S): 15 CAPSULE ORAL at 23:13

## 2019-10-04 RX ADMIN — ATORVASTATIN CALCIUM 80 MILLIGRAM(S): 80 TABLET, FILM COATED ORAL at 21:53

## 2019-10-04 RX ADMIN — CHLORHEXIDINE GLUCONATE 1 APPLICATION(S): 213 SOLUTION TOPICAL at 05:54

## 2019-10-04 RX ADMIN — Medication 2: at 11:41

## 2019-10-04 RX ADMIN — Medication 125 MILLIGRAM(S): at 00:44

## 2019-10-04 RX ADMIN — Medication 50 MILLIGRAM(S): at 05:54

## 2019-10-04 RX ADMIN — LIDOCAINE 2 PATCH: 4 CREAM TOPICAL at 06:26

## 2019-10-04 RX ADMIN — Medication 0.5 MILLIGRAM(S): at 17:50

## 2019-10-04 RX ADMIN — Medication 650 MILLIGRAM(S): at 01:30

## 2019-10-04 RX ADMIN — Medication 3: at 17:00

## 2019-10-04 RX ADMIN — Medication 125 MILLIGRAM(S): at 05:53

## 2019-10-04 RX ADMIN — INSULIN GLARGINE 25 UNIT(S): 100 INJECTION, SOLUTION SUBCUTANEOUS at 21:56

## 2019-10-04 RX ADMIN — Medication 125 MILLIGRAM(S): at 17:50

## 2019-10-04 RX ADMIN — LOSARTAN POTASSIUM 25 MILLIGRAM(S): 100 TABLET, FILM COATED ORAL at 05:53

## 2019-10-04 RX ADMIN — AMIODARONE HYDROCHLORIDE 400 MILLIGRAM(S): 400 TABLET ORAL at 05:53

## 2019-10-04 RX ADMIN — LIDOCAINE 2 PATCH: 4 CREAM TOPICAL at 12:52

## 2019-10-04 RX ADMIN — Medication 5 UNIT(S): at 08:07

## 2019-10-04 RX ADMIN — ENOXAPARIN SODIUM 60 MILLIGRAM(S): 100 INJECTION SUBCUTANEOUS at 17:50

## 2019-10-04 RX ADMIN — ENOXAPARIN SODIUM 60 MILLIGRAM(S): 100 INJECTION SUBCUTANEOUS at 05:53

## 2019-10-04 RX ADMIN — SPIRONOLACTONE 25 MILLIGRAM(S): 25 TABLET, FILM COATED ORAL at 05:53

## 2019-10-04 RX ADMIN — LIDOCAINE 2 PATCH: 4 CREAM TOPICAL at 17:44

## 2019-10-04 RX ADMIN — Medication 125 MILLIGRAM(S): at 12:52

## 2019-10-04 RX ADMIN — Medication 81 MILLIGRAM(S): at 12:52

## 2019-10-04 RX ADMIN — LIDOCAINE 2 PATCH: 4 CREAM TOPICAL at 17:45

## 2019-10-04 RX ADMIN — Medication 650 MILLIGRAM(S): at 02:30

## 2019-10-04 RX ADMIN — Medication 0.5 MILLIGRAM(S): at 05:54

## 2019-10-04 RX ADMIN — AMIODARONE HYDROCHLORIDE 200 MILLIGRAM(S): 400 TABLET ORAL at 12:59

## 2019-10-04 RX ADMIN — Medication 125 MILLIGRAM(S): at 23:13

## 2019-10-04 RX ADMIN — Medication 5 UNIT(S): at 11:41

## 2019-10-04 NOTE — PROGRESS NOTE ADULT - ATTENDING COMMENTS
68F with PMHx of uncontrolled DM (non-compliant) (Hg A1C from Aug 30th, 8.5%), ischemic cardiomyopathy, NSTEMI, severe CAD with LM/TVD, mitral regurgitation/aortic stenosis, h/o familial heart disease (fatehr  had heart attack in 50s), female uterine prolapse (3 weeks of indwelling montejo for urinary retention), paroxysmal Afib, recent labial abscess s/p ID, hip fracture s/p fixation, recent LGIB bleed (hemorrhoids)(wasn't scoped)    CV: (1' Cards: Dr. Mancia, CHF: Dr. Gandara)  preop for CABG/MAZE/NUBIA clip  Holding plavix  SR in 50s  decreased amio to 200qd  d/c dig  decrease lopr to 25q12  d/c ARB, start prn hydralazine  Lifevest prior to be set up    /GYN: (Dr. Nadeen Rodriguez)  has uterine prolapse  will d/w GYN urology regarding montejo  UCx negative after montejo was replaced on 9/28/2019  h/o previous UTI with EColi and enterococcus, treated with cipro    Renal:  trend bun/creat    ID: (Dr. Bourne)  h/o C diff on this admission  as per ID, pt abx d/c'd and isolation d/c'd    GI:  c. diff resolved  h/o constipation from opioids after hip surgery    Workup summary:  CT Angio Abdomen and Pelvis w/ IV Cont (08.29.19 @ 17:18) >  Cholelithiasis in a distended gallbladder. Subcentimeter   right hepatic hypodensity to small to characterize.  Calcified splenic aneurysm measuring 1.4 cm (series 7, image 24).  1.5 cm indeterminate hypoattenuating left renal lesion (series 7   image 43) measuring higher than simple fluid in attenuation even on   noncontrast images. Additional hypodensities too small to characterize.   Bilateral extrarenal pelves. No hydronephrosis.  Status post left intramedullary nail for   intertrochanteric fracture. Fracture line remains visible.  Impression:  1.  No CTA evidence of acute gastrointestinal hemorrhage.  2.  Markedly distended urinary bladder. Correlate for urinary retention.  3.  Indeterminate left upper pole renal lesion. Further evaluation with   contrast-enhanced MRI on a nonemergent basis recommended.  4.  Status post fixation of left femoral fracture. Fracture line remains   visible.    Transthoracic Echocardiogram (08.02.19 @ 09:11) >  LVEF of 55-60%, mild MAC, mild AS, peak gradient of 27mm Hg, mean 10.9mm Hg, JENNIFER 1.34cm2 c/w moderate AS    Transthoracic Echocardiogram (09.22.19 @ 06:53) >  LVEF of < 20% by visual inspection, (18% by Renner's Method with a biplane EF of 18 %)  The mitral valve leaflets are tethered due to reduced systolic function and elevated LVDP.  Moderate-to-severe mitral regurgitation.  AoV mean gradient of 10mm Hg, RVSP of 37.5mm Hg, bilateral pleural effusions.    10/1/2019:  Carotid duplex:  Impression:   20-39% stenosis of the right internal carotid artery.   20-39% stenosis of the left internal carotid artery.     10/1/2019 TTE  EF 25-30% (by Renner's Method with a biplane EF of 26 %), mod pleural effusion (L>R), mod to severe MR, RVSP of 53.9mm Hg

## 2019-10-04 NOTE — PROGRESS NOTE ADULT - SUBJECTIVE AND OBJECTIVE BOX
SUBJECTIVE ASSESSMENT:  pt feels better    Vital Signs Last 24 Hrs  T(C): 35.8 (04 Oct 2019 08:00), Max: 37.1 (03 Oct 2019 18:00)  T(F): 96.5 (04 Oct 2019 08:00), Max: 98.7 (03 Oct 2019 18:00)  HR: 57 (04 Oct 2019 12:00) (54 - 65)  BP: 98/50 (04 Oct 2019 12:00) (96/51 - 118/59)  BP(mean): 71 (04 Oct 2019 12:00) (70 - 83)  RR: 19 (04 Oct 2019 10:00) (15 - 27)  SpO2: 98% (04 Oct 2019 12:00) (96% - 100%)  10-03-19 @ 07:01  -  10-04-19 @ 07:00  --------------------------------------------------------  IN: 560 mL / OUT: 745 mL / NET: -185 mL    10-04-19 @ 07:01  -  10-04-19 @ 13:44  --------------------------------------------------------  IN: 250 mL / OUT: 240 mL / NET: 10 mL    A&OX3 in NAD  CTA bilat  nl s1, s2  abd soft/NT/ND  no peripheral edema    LABS:                        9.4    8.68  )-----------( 322      ( 03 Oct 2019 23:42 )             30.3     10-03    138  |  102  |  48<H>  ----------------------------<  147<H>  4.7   |  25  |  1.5    Ca    9.3      03 Oct 2019 23:42  Mg     2.2     10-03    TPro  6.2  /  Alb  3.4<L>  /  TBili  0.3  /  DBili  x   /  AST  17  /  ALT  20  /  AlkPhos  126<H>  10-03    MEDICATIONS  (STANDING):  ALPRAZolam 0.5 milliGRAM(s) Oral two times a day  amiodarone    Tablet 200 milliGRAM(s) Oral daily  aspirin  chewable 81 milliGRAM(s) Oral daily  atorvastatin 80 milliGRAM(s) Oral daily  enoxaparin Injectable 60 milliGRAM(s) SubCutaneous two times a day  insulin glargine Injectable (LANTUS) 25 Unit(s) SubCutaneous at bedtime  insulin lispro (HumaLOG) corrective regimen sliding scale   SubCutaneous three times a day before meals  insulin lispro Injectable (HumaLOG) 5 Unit(s) SubCutaneous three times a day before meals  lidocaine   Patch 2 Patch Transdermal daily  melatonin 3 milliGRAM(s) Oral at bedtime  metoprolol tartrate 25 milliGRAM(s) Oral two times a day  ondansetron Injectable 4 milliGRAM(s) IV Push once  pantoprazole    Tablet 40 milliGRAM(s) Oral before breakfast  vancomycin    Solution 125 milliGRAM(s) Oral every 6 hours    MEDICATIONS  (PRN):  dextrose 40% Gel 15 Gram(s) Oral once PRN Blood Glucose LESS THAN 70 milliGRAM(s)/deciliter  glucagon  Injectable 1 milliGRAM(s) IntraMuscular once PRN Glucose LESS THAN 70 milligrams/deciliter  temazepam 15 milliGRAM(s) Oral at bedtime PRN Insomnia

## 2019-10-05 LAB
ANION GAP SERPL CALC-SCNC: 10 MMOL/L — SIGNIFICANT CHANGE UP (ref 7–14)
BUN SERPL-MCNC: 48 MG/DL — HIGH (ref 10–20)
CALCIUM SERPL-MCNC: 9.4 MG/DL — SIGNIFICANT CHANGE UP (ref 8.5–10.1)
CHLORIDE SERPL-SCNC: 103 MMOL/L — SIGNIFICANT CHANGE UP (ref 98–110)
CO2 SERPL-SCNC: 26 MMOL/L — SIGNIFICANT CHANGE UP (ref 17–32)
CREAT SERPL-MCNC: 1.2 MG/DL — SIGNIFICANT CHANGE UP (ref 0.7–1.5)
GLUCOSE BLDC GLUCOMTR-MCNC: 105 MG/DL — HIGH (ref 70–99)
GLUCOSE BLDC GLUCOMTR-MCNC: 116 MG/DL — HIGH (ref 70–99)
GLUCOSE BLDC GLUCOMTR-MCNC: 163 MG/DL — HIGH (ref 70–99)
GLUCOSE BLDC GLUCOMTR-MCNC: 167 MG/DL — HIGH (ref 70–99)
GLUCOSE BLDC GLUCOMTR-MCNC: 203 MG/DL — HIGH (ref 70–99)
GLUCOSE BLDC GLUCOMTR-MCNC: 229 MG/DL — HIGH (ref 70–99)
GLUCOSE BLDC GLUCOMTR-MCNC: 233 MG/DL — HIGH (ref 70–99)
GLUCOSE BLDC GLUCOMTR-MCNC: 234 MG/DL — HIGH (ref 70–99)
GLUCOSE BLDC GLUCOMTR-MCNC: 98 MG/DL — SIGNIFICANT CHANGE UP (ref 70–99)
GLUCOSE SERPL-MCNC: 187 MG/DL — HIGH (ref 70–99)
POTASSIUM SERPL-MCNC: 4.8 MMOL/L — SIGNIFICANT CHANGE UP (ref 3.5–5)
POTASSIUM SERPL-SCNC: 4.8 MMOL/L — SIGNIFICANT CHANGE UP (ref 3.5–5)
SODIUM SERPL-SCNC: 139 MMOL/L — SIGNIFICANT CHANGE UP (ref 135–146)

## 2019-10-05 PROCEDURE — 99231 SBSQ HOSP IP/OBS SF/LOW 25: CPT

## 2019-10-05 PROCEDURE — 99233 SBSQ HOSP IP/OBS HIGH 50: CPT

## 2019-10-05 RX ORDER — GABAPENTIN 400 MG/1
100 CAPSULE ORAL THREE TIMES A DAY
Refills: 0 | Status: DISCONTINUED | OUTPATIENT
Start: 2019-10-05 | End: 2019-10-07

## 2019-10-05 RX ORDER — INSULIN HUMAN 100 [IU]/ML
1 INJECTION, SOLUTION SUBCUTANEOUS
Qty: 100 | Refills: 0 | Status: DISCONTINUED | OUTPATIENT
Start: 2019-10-05 | End: 2019-10-07

## 2019-10-05 RX ORDER — IPRATROPIUM/ALBUTEROL SULFATE 18-103MCG
3 AEROSOL WITH ADAPTER (GRAM) INHALATION EVERY 6 HOURS
Refills: 0 | Status: DISCONTINUED | OUTPATIENT
Start: 2019-10-05 | End: 2019-10-07

## 2019-10-05 RX ORDER — FUROSEMIDE 40 MG
40 TABLET ORAL ONCE
Refills: 0 | Status: COMPLETED | OUTPATIENT
Start: 2019-10-05 | End: 2019-10-05

## 2019-10-05 RX ADMIN — LIDOCAINE 2 PATCH: 4 CREAM TOPICAL at 20:00

## 2019-10-05 RX ADMIN — Medication 3 MILLIGRAM(S): at 21:01

## 2019-10-05 RX ADMIN — LIDOCAINE 2 PATCH: 4 CREAM TOPICAL at 11:34

## 2019-10-05 RX ADMIN — Medication 600 MILLIGRAM(S): at 17:56

## 2019-10-05 RX ADMIN — ATORVASTATIN CALCIUM 80 MILLIGRAM(S): 80 TABLET, FILM COATED ORAL at 21:01

## 2019-10-05 RX ADMIN — Medication 81 MILLIGRAM(S): at 11:35

## 2019-10-05 RX ADMIN — AMIODARONE HYDROCHLORIDE 200 MILLIGRAM(S): 400 TABLET ORAL at 05:37

## 2019-10-05 RX ADMIN — Medication 25 MILLIGRAM(S): at 17:56

## 2019-10-05 RX ADMIN — Medication 0.5 MILLIGRAM(S): at 05:42

## 2019-10-05 RX ADMIN — ENOXAPARIN SODIUM 60 MILLIGRAM(S): 100 INJECTION SUBCUTANEOUS at 17:56

## 2019-10-05 RX ADMIN — GABAPENTIN 100 MILLIGRAM(S): 400 CAPSULE ORAL at 18:30

## 2019-10-05 RX ADMIN — LIDOCAINE 2 PATCH: 4 CREAM TOPICAL at 07:00

## 2019-10-05 RX ADMIN — Medication 125 MILLIGRAM(S): at 05:37

## 2019-10-05 RX ADMIN — Medication 40 MILLIGRAM(S): at 15:00

## 2019-10-05 RX ADMIN — ENOXAPARIN SODIUM 60 MILLIGRAM(S): 100 INJECTION SUBCUTANEOUS at 05:37

## 2019-10-05 RX ADMIN — Medication 0.5 MILLIGRAM(S): at 17:56

## 2019-10-05 RX ADMIN — GABAPENTIN 100 MILLIGRAM(S): 400 CAPSULE ORAL at 21:01

## 2019-10-05 RX ADMIN — Medication 5 UNIT(S): at 08:14

## 2019-10-05 RX ADMIN — PANTOPRAZOLE SODIUM 40 MILLIGRAM(S): 20 TABLET, DELAYED RELEASE ORAL at 06:13

## 2019-10-05 NOTE — PROGRESS NOTE ADULT - ASSESSMENT
PROBLEMS:  I spent 45 minutes of critical care time examining patient, reviewing vitals, labs, medications, imaging and discussing with the team goals of care to prevent life-threatening in this patient who is at high risk for deterioration or death due to:    1.	CAD - need CABG - on ASA, lipitor metoprolol  2.	Systolic CHF with pleural effusions   3.	s/p c. diff - now of Abx   4.	very poor PFT - restrictive pattern - consider CT of Chest and possibly steroids  5.	urinary retention - keep Castellon  6.	s/p hip fracture - ambulate, Lovenox   7.	paroxysmal A.Fib - on amiodarone      PLAN  Neuro: move all 4 extremities. no sensory or motor deficits  Pain management.   ALPRAZolam 0.5 milliGRAM(s) Oral two times a day  melatonin 3 milliGRAM(s) Oral at bedtime  ondansetron Injectable 4 milliGRAM(s) IV Push once  temazepam 15 milliGRAM(s) Oral at bedtime PRN    Pulm: Wean off supplemental oxygen as able. Daily CXR. Encourage coughing, deep breathing and use of incentive spirometry.     Cardio: Monitor telemetry/alarms. Continue supportive care   amiodarone    Tablet 200 milliGRAM(s) Oral daily  metoprolol tartrate 25 milliGRAM(s) Oral two times a day    GI: Continue stool softeners.    pantoprazole    Tablet 40 milliGRAM(s) Oral before breakfast    Nutrition: Continue present diet  Endocrine and glucose control:   atorvastatin 80 milliGRAM(s) Oral daily  dextrose 40% Gel 15 Gram(s) Oral once PRN  dextrose 50% Injectable 12.5 Gram(s) IV Push once  dextrose 50% Injectable 25 Gram(s) IV Push once  dextrose 50% Injectable 25 Gram(s) IV Push once  glucagon  Injectable 1 milliGRAM(s) IntraMuscular once PRN  insulin regular Infusion 1 Unit(s)/Hr IV Continuous <Continuous>    Renal: monitor urine output, supplement electrolytes as needed,     Vasc: Heparin SC and/or SCDs for DVT prophylaxis  aspirin  chewable 81 milliGRAM(s) Oral daily  enoxaparin Injectable 60 milliGRAM(s) SubCutaneous two times a day    ID: Stable, no fever , no chills. Off antibiotics.    Therapy: OOB/ambulate  Disposition: start planing discharge home or placement    Pertinent clinical, laboratory, radiographic, hemodynamic, echocardiographic, respiratory data, microbiologic data and chart were reviewed and analyzed frequently throughout the course of the day and night. GI and DVT prophylaxis, glycemic control, head of bed elevation and skin care issues were addressed.  Patient seen, examined and plan discussed with CT Surgery / CTICU team during rounds.    [ ] The patient remains in critical and unstable condition, and requires ICU care and monitoring  [x] The patient is improving but requires continued monitoring and adjustment of therapy

## 2019-10-05 NOTE — PROGRESS NOTE ADULT - SUBJECTIVE AND OBJECTIVE BOX
OPERATIVE PROCEDURE(s):                POD #    SURGEON(s):      SUBJECTIVE ASSESSMENT:    Vital Signs Last 24 Hrs  T(C): 36.1 (05 Oct 2019 12:00), Max: 36.6 (05 Oct 2019 00:00)  T(F): 97 (05 Oct 2019 12:00), Max: 97.8 (05 Oct 2019 00:00)  HR: 64 (05 Oct 2019 12:00) (51 - 64)  BP: 102/50 (05 Oct 2019 12:00) (95/52 - 130/60)  BP(mean): 72 (05 Oct 2019 12:00) (68 - 88)  RR: 18 (05 Oct 2019 06:00) (18 - 20)  SpO2: 99% (05 Oct 2019 12:00) (94% - 99%)  10-04-19 @ 07:01  -  10-05-19 @ 07:00  --------------------------------------------------------  IN: 920 mL / OUT: 795 mL / NET: 125 mL    10-05-19 @ 07:01  -  10-05-19 @ 13:27  --------------------------------------------------------  IN: 250 mL / OUT: 200 mL / NET: 50 mL        A&OX3 in NAD  CTA bilat  nl s1, s2  abd soft/NT/ND  no peripheral edema    Central Venous Catheter: Yes[ ]  No[x ] , If Yes indication:           Day #    Castellon Catheter: Yes  [x ] , No [ ] : If yes indication:     urinary retention                   Day #    NGT: Yes [ ] No [  x]     If Yes Placement:                                          Day #    CHEST TUBE:  [ ] YES [ x] NO  OUTPUT:     per 24 hours    AIR LEAKS:  [ ] YES [ ] NO      BOWEL MOVEMENT:  [x] YES [ ] NO      LABS:                        9.4    8.68  )-----------( 322      ( 03 Oct 2019 23:42 )             30.3     COUMADIN:   [ ] YES [x ] NO      10-05    139  |  103  |  48<H>  ----------------------------<  187<H>  4.8   |  26  |  1.2    Ca    9.4      05 Oct 2019 09:07  Mg     2.2     10-03    TPro  6.2  /  Alb  3.4<L>  /  TBili  0.3  /  DBili  x   /  AST  17  /  ALT  20  /  AlkPhos  126<H>  10-03        MEDICATIONS  (STANDING):  ALPRAZolam 0.5 milliGRAM(s) Oral two times a day  amiodarone    Tablet 200 milliGRAM(s) Oral daily  aspirin  chewable 81 milliGRAM(s) Oral daily  atorvastatin 80 milliGRAM(s) Oral daily  chlorhexidine 4% Liquid 1 Application(s) Topical <User Schedule>  dextrose 5%. 1000 milliLiter(s) (50 mL/Hr) IV Continuous <Continuous>  dextrose 50% Injectable 12.5 Gram(s) IV Push once  dextrose 50% Injectable 25 Gram(s) IV Push once  dextrose 50% Injectable 25 Gram(s) IV Push once  enoxaparin Injectable 60 milliGRAM(s) SubCutaneous two times a day  insulin regular Infusion 1 Unit(s)/Hr (1 mL/Hr) IV Continuous <Continuous>  lidocaine   Patch 2 Patch Transdermal daily  melatonin 3 milliGRAM(s) Oral at bedtime  metoprolol tartrate 25 milliGRAM(s) Oral two times a day  ondansetron Injectable 4 milliGRAM(s) IV Push once  pantoprazole    Tablet 40 milliGRAM(s) Oral before breakfast    MEDICATIONS  (PRN):  dextrose 40% Gel 15 Gram(s) Oral once PRN Blood Glucose LESS THAN 70 milliGRAM(s)/deciliter  glucagon  Injectable 1 milliGRAM(s) IntraMuscular once PRN Glucose LESS THAN 70 milligrams/deciliter  temazepam 15 milliGRAM(s) Oral at bedtime PRN Insomnia      Allergies    No Known Allergies    Intolerances        Ambulation/Activity Status:    ambulates on her own    RADIOLOGY & ADDITIONAL TESTS:    EXAM:  XR CHEST PORTABLE ROUTINE 1V            PROCEDURE DATE:  10/04/2019        INTERPRETATION:  Clinical History / Reason for exam: CHF    Comparison : Chest radiograph 10/3/2019.    Technique/Positioning: Single AP view of the chest.    Findings:    Cardiac/mediastinum/hilum: Stable.    Lung parenchyma/Pleura: No significant change in bibasilar opacities and   pleural effusions.    Skeleton/soft tissues: Stable.    Impression:    No significant change in bibasilar opacities and pleural effusions.       Repear PFT's FEV1 0.84, 37% predicted    Assessment/Plan: Patient is a 69 yo female with severe CAD and ischemic cardiomyopathy who is pre-op for cabg to be scheduled on Monday  cont present tx as per ct surgeon  cad- cont asa, lop, and statin  a. fib- cont amio  cont therapeutic lovenox for left main disease and nstemi  repeat pft's appreciated will order resp tx's to optimize lungs prior to sx  chf- no acei/arb to prevent teddy-op atn  dm- initiate insulin gtt protocol        Social Service Disposition:

## 2019-10-05 NOTE — PROGRESS NOTE ADULT - SUBJECTIVE AND OBJECTIVE BOX
CTU Attending Progress Daily Note     05 Oct 2019 13:03  Admited 09-21-19, Hospital Day 14d      HPI:  68F with PMHx of uncontrolled DM (non-compliant), viral myopathy, Paroxysmal Afib, recent labial abscess s/p ID, hip fracture s/p fixation, recent LGIB bleed now presents for shortness of breath. Patient has been having shortness of breath for the last day. Occurs on exertion and stationary however no worsening of breathing when laying down. Patient denied abdominal pain however family reports she has been having some abdominal discomfort over the last day. Patient also has an indwelling montejo for the last 3 weeks for urinary retention. Reports cough with no sputum production. Denies chest pain, fever, nausea/vomiting, diarrhea. (21 Sep 2019 21:52)    Home Medications:  aspirin 81 mg oral tablet: 1 tab(s) orally once a day (29 Aug 2019 20:19)    FAMILY HISTORY:  FH: myocardial infarction  FH: stomach cancer    PAST MEDICAL & SURGICAL HISTORY:  Female bladder prolapse  Diabetes  History of repair of hip fracture    Interval event for past 24 hr:  GUS WILBURN  68y had no event.   Current Complains:  GUS WILBURN has no new complains  Allergies    No Known Allergies    Intolerances      OBJECTIVE:  Vitals last 24 hrs  T(C): 36.1 (10-05-19 @ 12:00), Max: 36.6 (10-05-19 @ 00:00)  T(F): 97 (10-05-19 @ 12:00), Max: 97.8 (10-05-19 @ 00:00)  HR: 64 (10-05-19 @ 12:00) (51 - 64)  BP: 102/50 (10-05-19 @ 12:00) (95/52 - 130/60)  ABP: --  ABP(mean): --  RR: 18 (10-05-19 @ 06:00) (18 - 20)  SpO2: 99% (10-05-19 @ 12:00) (94% - 99%)      10-04-19 @ 07:01  -  10-05-19 @ 07:00  --------------------------------------------------------  IN:    Oral Fluid: 920 mL  Total IN: 920 mL    OUT:    Indwelling Catheter - Urethral: 795 mL  Total OUT: 795 mL    Total NET: 125 mL          CAPILLARY BLOOD GLUCOSE      POCT Blood Glucose.: 203 mg/dL (05 Oct 2019 11:30)  POCT Blood Glucose.: 116 mg/dL (05 Oct 2019 06:58)  POCT Blood Glucose.: 163 mg/dL (04 Oct 2019 21:55)  POCT Blood Glucose.: 298 mg/dL (04 Oct 2019 16:29)    LABS:                          9.4    8.68  )-----------( 322      ( 03 Oct 2019 23:42 )             30.3     Hemoglobin: 9.4 g/dL (10-03 @ 23:42)  Hemoglobin: 9.4 g/dL (10-03 @ 04:00)    10-05    139  |  103  |  48<H>  ----------------------------<  187<H>  4.8   |  26  |  1.2    Ca    9.4      05 Oct 2019 09:07  Mg     2.2     10-03    TPro  6.2  /  Alb  3.4<L>  /  TBili  0.3  /  DBili  x   /  AST  17  /  ALT  20  /  AlkPhos  126<H>  10-03    Creatinine, Serum: 1.2 mg/dL (10-05 @ 09:07)  Creatinine, Serum: 1.5 mg/dL (10-03 @ 23:42)  Creatinine, Serum: 1.3 mg/dL (10-03 @ 04:00)  Creatinine, Serum: 1.0 mg/dL (10-02 @ 04:26)          HOSPITAL MEDICATIONS:  MEDICATIONS  (STANDING):  ALPRAZolam 0.5 milliGRAM(s) Oral two times a day  amiodarone    Tablet 200 milliGRAM(s) Oral daily  aspirin  chewable 81 milliGRAM(s) Oral daily  atorvastatin 80 milliGRAM(s) Oral daily  chlorhexidine 4% Liquid 1 Application(s) Topical <User Schedule>  dextrose 5%. 1000 milliLiter(s) (50 mL/Hr) IV Continuous <Continuous>  dextrose 50% Injectable 12.5 Gram(s) IV Push once  dextrose 50% Injectable 25 Gram(s) IV Push once  dextrose 50% Injectable 25 Gram(s) IV Push once  enoxaparin Injectable 60 milliGRAM(s) SubCutaneous two times a day  insulin regular Infusion 1 Unit(s)/Hr (1 mL/Hr) IV Continuous <Continuous>  lidocaine   Patch 2 Patch Transdermal daily  melatonin 3 milliGRAM(s) Oral at bedtime  metoprolol tartrate 25 milliGRAM(s) Oral two times a day  ondansetron Injectable 4 milliGRAM(s) IV Push once  pantoprazole    Tablet 40 milliGRAM(s) Oral before breakfast    MEDICATIONS  (PRN):  dextrose 40% Gel 15 Gram(s) Oral once PRN Blood Glucose LESS THAN 70 milliGRAM(s)/deciliter  glucagon  Injectable 1 milliGRAM(s) IntraMuscular once PRN Glucose LESS THAN 70 milligrams/deciliter  temazepam 15 milliGRAM(s) Oral at bedtime PRN Insomnia      REVIEW OF SYSTEMS:  CONSTITUTIONAL: [X] all negative; [ ] weakness, [ ] fevers, [ ] chills  EYES/ENT: [X] all negative; [ ] visual changes, [ ] vertigo, [ ] throat pain, [ ] eye pain  NECK: [X] all negative; [ ] pain, [ ] stiffness  RESPIRATORY: [ ] all negative, [ ] cough, [ ] wheezing, [ ] hemoptysis, [x ] shortness of breath  CARDIOVASCULAR: [ ] all negative; [ ] chest pain, [ ] palpitations, [x ] orthopnea  GASTROINTESTINAL: [X] all negative; [ ]abdominal pain, [ ] nausea, [ ] vomiting, [ ] hematemesis, [ ] diarrhea, [ ] constipation, [ ] melena, [ ] hematochezia.  GENITOURINARY: [X] all negative; [ ] dysuria, [ ] frequency, [ ] hematuria  NEUROLOGICAL: [X] all negative; [ ] numbness, [ ] weakness, [ ] paresthesias  MUSCULOSKELETAL: [X] all negative, [ ] joint pain, [ ] joint swelling, [ ] joint redness, [ ] bone pain  SKIN: [X] all negative; [ ] itching, [ ] burning, [ ] rashes, [ ] lesions   All other review of systems is negative unless indicated above.    [  ] Unable to assess ROS because     PHYSICAL EXAM:          CONSTITUTIONAL: Well-developed; well-nourished; in no acute distress.   	SKIN: warm, dry, no rashes or lesions  	HEENT: Atraumatic. Normocephalic. PERRL. Moist membranes, no conj injection, sclera clear  	NECK: Supple; non tender.  No JVD. No lymphadenopathy.  	CARD: Normal S1, S2. Rate and Rhythm are regular. No murmurs.  	RESP: Good air entry bilaterally, no wheezes, + rales no rhonchi.  	ABD: Soft, not tender, not distended, no CVA ttp no rebound no guarding, bowel sounds present  	EXT: Normal ROM.  No clubbing, no cyanosis, no pedal edema, no calf pain b/l, Peripheral pulses intact.  	LYMPH: No acute cervical adenopathy.  	NEURO: Alert, awake, motor 5/5 R, 5/5 L, sensation intact bilat, CN 2-12 intact,          PSYCH: Cooperative, appropriate. Alert & oriented x 3    RADIOLOGY:  X Reviewed and interpreted by me  CxR from 10-05-19 not done     ECG:  X Reviewed and interpreted by me - NSR

## 2019-10-05 NOTE — CONSULT NOTE ADULT - SUBJECTIVE AND OBJECTIVE BOX
HPI       "69 y/o F with h/o DM, recent hip replacement admitted with c/o sob that started acutely 6 days ago. Per family, patient was lethargic, sob and complaint of nausea. She also felt chest discomfort that radiated to the back. Upon admission, an echocardiogram showed LVEF ~ 15% from ~ 45% one month earlier. His troponin were elevated and pro-BNP was 20 k. A lHC showed triple vessel disease with 80% disease of LM.       PMH: DM, Hip fracture    Social: Denies ETOH, smoking     FH: father  from heart disease in his 50s, mother  in her 30s from cancer"      Attending HPI: The above history was taken from the chart.  I myself interviewed the patient and her family and confirmed the above.  My additions/corrections are below.  This 68 year old woman has a severely depressed ejection fraction and is scheduled for CABG monday.  She reports she was diagnosed with diabetes in august, after many years of "neglecting" herself.  She has been taking metformin 500 mg bid and Januvia 50 mg q.d. for management of her diabetes.   She has an indwelling montejo due to prolapsed bladder    Additional Medical History: Cardiomyopathy.    Family Hx: noncontributory  PSHx: hip replacement      ROS: no active SOB, CP  no fever, chills, nausea, or vomiting.  no voice changes.    116/67  P = 72  RR = 12-14     Physical Exam:   		  · Constitutional Details	well-developed; well-groomed	  · Neck	detailed exam	  · Neck Details	no JVD	  		  · Respiratory Details	airway patent; good air movement	  · Respiratory Comments	No crackles	  		  · Cardiovascular Details	regular rate and rhythm  no rub  + KIKI	  		  · Extremities Details	no pedal edema	  		  · Neurological Details	alert and oriented x 3; normal strength	      Assessment and Plan:   · Assessment		  67 y/o F with h/o DM admitted with c/o acute onset of SOB associated with nausea, back pain and weakness. Patient with positive troponin on admission and LVEF of ~ 15%.     Recommendations:  The patient is currently on an IV insulin infusion, which I would recommend continuing until after surgical intervention.  At that point, depending on her requirements, will switch to basal-bolus therpay.    With respect to her discharge medications, much will depend on her post-operative renal function.  If her renal function is adequate, will consider the resumption of metformin.  I would likely substitute a GLP-1 receptor agonist for the Januvia given strong evidence in the GLP-1 class for cardiovascular risk reduction.    Further, given her CHF, CKD, and CAD, she would further be an excellent candidate for an SGLT-2 inhibitor due to well-established cardiovascular risk reduction/nephroprotection in this class.  My only reservation relative to the SGLT-2 class in this particular patient is the indwelling montejo and her history of at least 2 UTI's related to the montejo.      This is a topic that can certainly be revisited as an outpatient.

## 2019-10-05 NOTE — PROGRESS NOTE ADULT - SUBJECTIVE AND OBJECTIVE BOX
SUBJ:no new complains      MEDICATIONS  (STANDING):  ALPRAZolam 0.5 milliGRAM(s) Oral two times a day  amiodarone    Tablet 200 milliGRAM(s) Oral daily  aspirin  chewable 81 milliGRAM(s) Oral daily  atorvastatin 80 milliGRAM(s) Oral daily  chlorhexidine 4% Liquid 1 Application(s) Topical <User Schedule>  dextrose 5%. 1000 milliLiter(s) (50 mL/Hr) IV Continuous <Continuous>  dextrose 50% Injectable 12.5 Gram(s) IV Push once  dextrose 50% Injectable 25 Gram(s) IV Push once  dextrose 50% Injectable 25 Gram(s) IV Push once  enoxaparin Injectable 60 milliGRAM(s) SubCutaneous two times a day  insulin glargine Injectable (LANTUS) 25 Unit(s) SubCutaneous at bedtime  insulin lispro (HumaLOG) corrective regimen sliding scale   SubCutaneous three times a day before meals  insulin lispro Injectable (HumaLOG) 5 Unit(s) SubCutaneous three times a day before meals  lidocaine   Patch 2 Patch Transdermal daily  melatonin 3 milliGRAM(s) Oral at bedtime  metoprolol tartrate 25 milliGRAM(s) Oral two times a day  ondansetron Injectable 4 milliGRAM(s) IV Push once  pantoprazole    Tablet 40 milliGRAM(s) Oral before breakfast    MEDICATIONS  (PRN):  dextrose 40% Gel 15 Gram(s) Oral once PRN Blood Glucose LESS THAN 70 milliGRAM(s)/deciliter  glucagon  Injectable 1 milliGRAM(s) IntraMuscular once PRN Glucose LESS THAN 70 milligrams/deciliter  temazepam 15 milliGRAM(s) Oral at bedtime PRN Insomnia            Vital Signs Last 24 Hrs  T(C): 36.3 (05 Oct 2019 05:30), Max: 36.6 (05 Oct 2019 00:00)  T(F): 97.3 (05 Oct 2019 05:30), Max: 97.8 (05 Oct 2019 00:00)  HR: 55 (05 Oct 2019 06:00) (51 - 63)  BP: 123/59 (05 Oct 2019 06:00) (96/51 - 130/60)  BP(mean): 85 (05 Oct 2019 06:00) (70 - 88)  RR: 18 (05 Oct 2019 06:00) (18 - 20)  SpO2: 98% (05 Oct 2019 06:00) (94% - 100%)     REVIEW OF SYSTEMS:  CONSTITUTIONAL: No fever, weight loss, or fatigue  CARDIOLOGY: PAtient denies chest pain, shortness of breath or syncopal episodes.   RESPIRATORY: denies shortness of breath, wheezeing.   NEUROLOGICAL: NO weakness, no focal deficits to report.  ENDOCRINOLOGICAL: no recent change in diabetic medications.   GI: no BRBPR, no N,V,diarrhea.    PSYCHIATRY: normal mood and affect  HEENT: no nasal discharge, no ecchymosis  SKIN: no ecchymosis, no breakdown  MUSCULOSKELETAL: Full range of motion x4.        PHYSICAL EXAM:  · CONSTITUTIONAL:	Well-developed, well nourished    BMI-  ·RESPIRATORY:   airway patent; breath sounds equal; good air movement; respirations non-labored; clear to auscultation bilaterally; no chest wall tenderness; no intercostal retractions; no rales,rhonchi or wheeze  · CARDIOVASCULAR	regular rate and rhythm  no rub  no murmur  normal PMI  · EXTREMITIES: No cyanosis, clubbing or edema  · VASCULAR: 	Equal and normal pulses (carotid, femoral, dorsalis pedis)  	  TELEMETRY:    ECG:    TTE:    LABS:                        9.4    8.68  )-----------( 322      ( 03 Oct 2019 23:42 )             30.3     10-03    138  |  102  |  48<H>  ----------------------------<  147<H>  4.7   |  25  |  1.5    Ca    9.3      03 Oct 2019 23:42  Mg     2.2     10-03    TPro  6.2  /  Alb  3.4<L>  /  TBili  0.3  /  DBili  x   /  AST  17  /  ALT  20  /  AlkPhos  126<H>  10-03            I&O's Summary    04 Oct 2019 07:01  -  05 Oct 2019 07:00  --------------------------------------------------------  IN: 920 mL / OUT: 720 mL / NET: 200 mL      BNP  RADIOLOGY & ADDITIONAL STUDIES:    IMPRESSION AND PLAN:    Awaiting CABG  continue with current management

## 2019-10-06 LAB
ANION GAP SERPL CALC-SCNC: 11 MMOL/L — SIGNIFICANT CHANGE UP (ref 7–14)
BUN SERPL-MCNC: 54 MG/DL — HIGH (ref 10–20)
CALCIUM SERPL-MCNC: 9.3 MG/DL — SIGNIFICANT CHANGE UP (ref 8.5–10.1)
CHLORIDE SERPL-SCNC: 104 MMOL/L — SIGNIFICANT CHANGE UP (ref 98–110)
CO2 SERPL-SCNC: 25 MMOL/L — SIGNIFICANT CHANGE UP (ref 17–32)
CREAT SERPL-MCNC: 1.3 MG/DL — SIGNIFICANT CHANGE UP (ref 0.7–1.5)
GLUCOSE BLDC GLUCOMTR-MCNC: 102 MG/DL — HIGH (ref 70–99)
GLUCOSE BLDC GLUCOMTR-MCNC: 103 MG/DL — HIGH (ref 70–99)
GLUCOSE BLDC GLUCOMTR-MCNC: 131 MG/DL — HIGH (ref 70–99)
GLUCOSE BLDC GLUCOMTR-MCNC: 149 MG/DL — HIGH (ref 70–99)
GLUCOSE BLDC GLUCOMTR-MCNC: 157 MG/DL — HIGH (ref 70–99)
GLUCOSE BLDC GLUCOMTR-MCNC: 195 MG/DL — HIGH (ref 70–99)
GLUCOSE BLDC GLUCOMTR-MCNC: 216 MG/DL — HIGH (ref 70–99)
GLUCOSE BLDC GLUCOMTR-MCNC: 232 MG/DL — HIGH (ref 70–99)
GLUCOSE BLDC GLUCOMTR-MCNC: 234 MG/DL — HIGH (ref 70–99)
GLUCOSE BLDC GLUCOMTR-MCNC: 265 MG/DL — HIGH (ref 70–99)
GLUCOSE BLDC GLUCOMTR-MCNC: 311 MG/DL — HIGH (ref 70–99)
GLUCOSE BLDC GLUCOMTR-MCNC: 95 MG/DL — SIGNIFICANT CHANGE UP (ref 70–99)
GLUCOSE BLDC GLUCOMTR-MCNC: 97 MG/DL — SIGNIFICANT CHANGE UP (ref 70–99)
GLUCOSE SERPL-MCNC: 158 MG/DL — HIGH (ref 70–99)
MRSA PCR RESULT.: NEGATIVE — SIGNIFICANT CHANGE UP
POTASSIUM SERPL-MCNC: 5.2 MMOL/L — HIGH (ref 3.5–5)
POTASSIUM SERPL-SCNC: 5.2 MMOL/L — HIGH (ref 3.5–5)
SODIUM SERPL-SCNC: 140 MMOL/L — SIGNIFICANT CHANGE UP (ref 135–146)

## 2019-10-06 PROCEDURE — 99233 SBSQ HOSP IP/OBS HIGH 50: CPT

## 2019-10-06 PROCEDURE — 99231 SBSQ HOSP IP/OBS SF/LOW 25: CPT | Mod: 57

## 2019-10-06 RX ORDER — CHLORHEXIDINE GLUCONATE 213 G/1000ML
5 SOLUTION TOPICAL ONCE
Refills: 0 | Status: COMPLETED | OUTPATIENT
Start: 2019-10-06 | End: 2019-10-07

## 2019-10-06 RX ORDER — ACETAMINOPHEN 500 MG
650 TABLET ORAL ONCE
Refills: 0 | Status: COMPLETED | OUTPATIENT
Start: 2019-10-06 | End: 2019-10-06

## 2019-10-06 RX ORDER — SODIUM POLYSTYRENE SULFONATE 4.1 MEQ/G
15 POWDER, FOR SUSPENSION ORAL ONCE
Refills: 0 | Status: COMPLETED | OUTPATIENT
Start: 2019-10-06 | End: 2019-10-06

## 2019-10-06 RX ORDER — METOPROLOL TARTRATE 50 MG
12.5 TABLET ORAL ONCE
Refills: 0 | Status: COMPLETED | OUTPATIENT
Start: 2019-10-06 | End: 2019-10-06

## 2019-10-06 RX ADMIN — Medication 600 MILLIGRAM(S): at 06:23

## 2019-10-06 RX ADMIN — Medication 650 MILLIGRAM(S): at 18:13

## 2019-10-06 RX ADMIN — Medication 25 MILLIGRAM(S): at 06:22

## 2019-10-06 RX ADMIN — Medication 12.5 MILLIGRAM(S): at 18:14

## 2019-10-06 RX ADMIN — CHLORHEXIDINE GLUCONATE 1 APPLICATION(S): 213 SOLUTION TOPICAL at 05:50

## 2019-10-06 RX ADMIN — Medication 3 MILLILITER(S): at 21:08

## 2019-10-06 RX ADMIN — PANTOPRAZOLE SODIUM 40 MILLIGRAM(S): 20 TABLET, DELAYED RELEASE ORAL at 06:22

## 2019-10-06 RX ADMIN — GABAPENTIN 100 MILLIGRAM(S): 400 CAPSULE ORAL at 21:19

## 2019-10-06 RX ADMIN — ENOXAPARIN SODIUM 60 MILLIGRAM(S): 100 INJECTION SUBCUTANEOUS at 05:50

## 2019-10-06 RX ADMIN — Medication 0.5 MILLIGRAM(S): at 05:49

## 2019-10-06 RX ADMIN — AMIODARONE HYDROCHLORIDE 200 MILLIGRAM(S): 400 TABLET ORAL at 05:49

## 2019-10-06 RX ADMIN — Medication 600 MILLIGRAM(S): at 18:13

## 2019-10-06 RX ADMIN — ATORVASTATIN CALCIUM 80 MILLIGRAM(S): 80 TABLET, FILM COATED ORAL at 21:19

## 2019-10-06 RX ADMIN — Medication 3 MILLIGRAM(S): at 21:19

## 2019-10-06 RX ADMIN — Medication 81 MILLIGRAM(S): at 13:27

## 2019-10-06 RX ADMIN — SODIUM POLYSTYRENE SULFONATE 15 GRAM(S): 4.1 POWDER, FOR SUSPENSION ORAL at 13:27

## 2019-10-06 RX ADMIN — GABAPENTIN 100 MILLIGRAM(S): 400 CAPSULE ORAL at 05:49

## 2019-10-06 RX ADMIN — Medication 0.5 MILLIGRAM(S): at 21:19

## 2019-10-06 RX ADMIN — GABAPENTIN 100 MILLIGRAM(S): 400 CAPSULE ORAL at 13:27

## 2019-10-06 NOTE — PRE-OP CHECKLIST - SELECT TESTS ORDERED
CMP/INR/Type and Cross/EKG/CBC/POCT Blood Glucose/BMP/Spirometry/Type and Screen/CXR/PT/PTT/Urinalysis

## 2019-10-06 NOTE — PROGRESS NOTE ADULT - SUBJECTIVE AND OBJECTIVE BOX
CTU Attending Progress Daily Note     06 Oct 2019 09:25    Patient seen as pre-op critical care follow-up    HPI:  68F with PMHx of uncontrolled DM (non-compliant), viral myopathy, Paroxysmal Afib, recent labial abscess s/p ID, hip fracture s/p fixation, recent LGIB bleed now presents for shortness of breath. Patient has been having shortness of breath for the last day. Occurs on exertion and stationary however no worsening of breathing when laying down. Patient denied abdominal pain however family reports she has been having some abdominal discomfort over the last day. Patient also has an indwelling montejo for the last 3 weeks for urinary retention. Reports cough with no sputum production. Denies chest pain, fever, nausea/vomiting, diarrhea. (21 Sep 2019 21:52)    See preop testing chart H&P    Interval event for past 24 hr:  GUS WILBURN  68y had bradycardia    Current Complains:  GUS WILBURN has no new complaints    REVIEW OF SYSTEMS:  CONSTITUTIONAL:  [-] weakness, [-] fevers, [-] chills  EYES/ENT: [-] visual changes, [-] vertigo, [-] throat pain   NECK: [-] pain, [-] stiffness  RESPIRATORY: [-] cough, [-] wheezing, [-] hemoptysis, [-] shortness of breath  CARDIOVASCULAR: [-] chest pain, [-] palpitations, [-] orthopnea  GASTROINTESTINAL:    [-]abdominal pain, [-] nausea, [-] vomiting, [-] hematemesis, [-] diarrhea, [-] constipation, [-] melena, [-] hematochezia.  GENITOURINARY: [-] dysuria, [-] frequency, [-] hematuria  NEUROLOGICAL: [-] numbness, [-] weakness  SKIN: [-] itching, [-] burning, [-] rashes, [-] lesions   All other review of systems is negative unless indicated above.    [  ] Unable to assess ROS because :    OBJECTIVE:  ICU Vital Signs Last 24 Hrs  T(C): 36.2 (06 Oct 2019 04:00), Max: 36.7 (05 Oct 2019 19:45)  T(F): 97.2 (06 Oct 2019 04:00), Max: 98 (05 Oct 2019 19:45)  HR: 58 (06 Oct 2019 07:00) (52 - 72)  BP: 114/59 (06 Oct 2019 07:00) (95/52 - 122/90)  BP(mean): 83 (06 Oct 2019 07:00) (68 - 102)  ABP: --  ABP(mean): --  RR: 30 (06 Oct 2019 07:00) (14 - 30)  SpO2: 99% (06 Oct 2019 07:00) (96% - 100%)      I&O's Summary    05 Oct 2019 07:01  -  06 Oct 2019 07:00  --------------------------------------------------------  IN: 587 mL / OUT: 2005 mL / NET: -1418 mL      PHYSICAL EXAM:  General: WN/WD NAD    HEENT:     [+] NCAT  [+] EOMI  [-] Conjuctival edema   [-] Icterus   [-] Thrush   [-] ETT  [-] NGT/OGT    Neck:         [+] FROM   [-] JVD     [-] Nodes     [-] Masses    [+] Mid-line trachea    [-] Tracheostomy    Chest:        [-] SubQ emphysema    Lungs:          [+] CTA   [-] Rhonchi   [-] Rales    [-] Wheezing    [-] Decreased BS    [-] Dullness R L    Cardiac:       [+] S1 [+] S2    [+] RRR   [-] Irregular   [-] S3   [-] S4    [-] Murmurs    [-] Rub    Abdomen:    [+] BS    [+] Soft    [+] Non-tender     [-] Distended    [-] Organomegaly  [-] PEG    Extremities:   [-] Cyanosis U/L   [-] Clubbing  U/L  [-] LE/UE Edema   [+] Capillary refill    [+] Pulses     Neuro:        [+] Awake   [+]  Alert   [-] Confused   [-] Lethargic   [-] Sedated   [-] Generalized Weakness    Skin:        [-] Rashes    [-] Erythema    [+] IV sites intact   [-] Sacral decubitus      LINES:    CAPILLARY BLOOD GLUCOSE      POCT Blood Glucose.: 102 mg/dL (06 Oct 2019 06:27)    CAPILLARY BLOOD GLUCOSE      POCT Blood Glucose.: 102 mg/dL (06 Oct 2019 06:27)  POCT Blood Glucose.: 97 mg/dL (06 Oct 2019 04:11)  POCT Blood Glucose.: 131 mg/dL (06 Oct 2019 03:01)  POCT Blood Glucose.: 157 mg/dL (06 Oct 2019 01:46)  POCT Blood Glucose.: 234 mg/dL (06 Oct 2019 00:05)  POCT Blood Glucose.: 163 mg/dL (05 Oct 2019 22:57)  POCT Blood Glucose.: 98 mg/dL (05 Oct 2019 19:53)  POCT Blood Glucose.: 105 mg/dL (05 Oct 2019 18:00)  POCT Blood Glucose.: 167 mg/dL (05 Oct 2019 16:13)  POCT Blood Glucose.: 233 mg/dL (05 Oct 2019 15:00)  POCT Blood Glucose.: 229 mg/dL (05 Oct 2019 14:04)  POCT Blood Glucose.: 234 mg/dL (05 Oct 2019 12:58)  POCT Blood Glucose.: 203 mg/dL (05 Oct 2019 11:30)      HOSPITAL MEDICATIONS:  MEDICATIONS  (STANDING):  ALBUTerol/ipratropium for Nebulization 3 milliLiter(s) Nebulizer every 6 hours  ALPRAZolam 0.5 milliGRAM(s) Oral two times a day  amiodarone    Tablet 200 milliGRAM(s) Oral daily  aspirin  chewable 81 milliGRAM(s) Oral daily  atorvastatin 80 milliGRAM(s) Oral daily  chlorhexidine 4% Liquid 1 Application(s) Topical <User Schedule>  dextrose 5%. 1000 milliLiter(s) (50 mL/Hr) IV Continuous <Continuous>  dextrose 50% Injectable 12.5 Gram(s) IV Push once  dextrose 50% Injectable 25 Gram(s) IV Push once  dextrose 50% Injectable 25 Gram(s) IV Push once  enoxaparin Injectable 60 milliGRAM(s) SubCutaneous two times a day  gabapentin 100 milliGRAM(s) Oral three times a day  guaiFENesin  milliGRAM(s) Oral every 12 hours  insulin regular Infusion 1 Unit(s)/Hr (1 mL/Hr) IV Continuous <Continuous>  lidocaine   Patch 2 Patch Transdermal daily  melatonin 3 milliGRAM(s) Oral at bedtime  metoprolol tartrate 25 milliGRAM(s) Oral two times a day  ondansetron Injectable 4 milliGRAM(s) IV Push once  pantoprazole    Tablet 40 milliGRAM(s) Oral before breakfast    MEDICATIONS  (PRN):  dextrose 40% Gel 15 Gram(s) Oral once PRN Blood Glucose LESS THAN 70 milliGRAM(s)/deciliter  glucagon  Injectable 1 milliGRAM(s) IntraMuscular once PRN Glucose LESS THAN 70 milligrams/deciliter  temazepam 15 milliGRAM(s) Oral at bedtime PRN Insomnia      LABS:      10-06    140  |  104  |  54<H>  ----------------------------<  158<H>  5.2<H>   |  25  |  1.3    Ca    9.3      06 Oct 2019 02:00              RADIOLOGY:  Reviewed and interpreted by me  CXR from 10-06-19 shows [+] congestion, [-] pneumothorax, [+] R/L effusion, [-] cardiomegaly,       ECG:  none    Assessment:  CAD preop CABG  acute systolic CHF  DM uncontrolled/ hyperglycemia on insulin gtt    PAST MEDICAL & SURGICAL HISTORY:  Female bladder prolapse  Diabetes  History of repair of hip fracture      PLAN:  OR planning per CTS  Pulm: Encourage coughing, deep breathing and use of incentive spirometry.   Cardio: Monitor telemetry/alarms. Continue cardiac meds  GI: Tolerating diet. Continue stool softeners. Continue GI prophylaxis  Renal: monitor urine output, supplement electrolytes as needed  Vasc: Heparin SC/SCDs for DVT prophylaxis  Heme: Monitor H/H.   ID: Off antibiotics. Stable.  Endocrine: Monitor finger stick blood sugar and control hyperglycemia with insulin  Physical Therapy: OOB/ambulate        Discussed with Cardiothoracic Team at AM rounds.

## 2019-10-06 NOTE — PROGRESS NOTE ADULT - SUBJECTIVE AND OBJECTIVE BOX
Cardiac Surgery Pre-op Note:  CC: Patient is a 68y old  Female who presents with a chief complaint of cad (05 Oct 2019 13:26)      Referring Physician: brian                                                                                            Surgeon: yuli  Procedure: (Date) (Procedure)    Allergies    No Known Allergies    Intolerances      HPI:  68F with PMHx of uncontrolled DM (non-compliant), viral myopathy, Paroxysmal Afib, recent labial abscess s/p ID, hip fracture s/p fixation, recent LGIB bleed now presents for shortness of breath. Patient has been having shortness of breath for the last day. Occurs on exertion and stationary however no worsening of breathing when laying down. Patient denied abdominal pain however family reports she has been having some abdominal discomfort over the last day. Patient also has an indwelling montejo for the last 3 weeks for urinary retention. Reports cough with no sputum production. Denies chest pain, fever, nausea/vomiting, diarrhea. Cardiac enzymes ruled in for NSTEMI.  Cardiac catheterization demonstrated triple vessel LM disease.      PAST MEDICAL & SURGICAL HISTORY:  Female bladder prolapse  Diabetes  History of repair of hip fracture      MEDICATIONS  (STANDING):  ALBUTerol/ipratropium for Nebulization 3 milliLiter(s) Nebulizer every 6 hours  ALPRAZolam 0.5 milliGRAM(s) Oral two times a day  amiodarone    Tablet 200 milliGRAM(s) Oral daily  aspirin  chewable 81 milliGRAM(s) Oral daily  atorvastatin 80 milliGRAM(s) Oral daily  chlorhexidine 4% Liquid 1 Application(s) Topical <User Schedule>  dextrose 5%. 1000 milliLiter(s) (50 mL/Hr) IV Continuous <Continuous>  dextrose 50% Injectable 12.5 Gram(s) IV Push once  dextrose 50% Injectable 25 Gram(s) IV Push once  dextrose 50% Injectable 25 Gram(s) IV Push once  gabapentin 100 milliGRAM(s) Oral three times a day  guaiFENesin  milliGRAM(s) Oral every 12 hours  insulin regular Infusion 1 Unit(s)/Hr (1 mL/Hr) IV Continuous <Continuous>  lidocaine   Patch 2 Patch Transdermal daily  melatonin 3 milliGRAM(s) Oral at bedtime  metoprolol tartrate 12.5 milliGRAM(s) Oral once  ondansetron Injectable 4 milliGRAM(s) IV Push once  pantoprazole    Tablet 40 milliGRAM(s) Oral before breakfast    MEDICATIONS  (PRN):  dextrose 40% Gel 15 Gram(s) Oral once PRN Blood Glucose LESS THAN 70 milliGRAM(s)/deciliter  glucagon  Injectable 1 milliGRAM(s) IntraMuscular once PRN Glucose LESS THAN 70 milligrams/deciliter  temazepam 15 milliGRAM(s) Oral at bedtime PRN Insomnia      Labs:    10-06    140  |  104  |  54<H>  ----------------------------<  158<H>  5.2<H>   |  25  |  1.3    Ca    9.3      06 Oct 2019 02:00    Complete Blood Count (10.03.19 @ 23:42)    Nucleated RBC: 0 /100 WBCs    WBC Count: 8.68 K/uL    RBC Count: 3.61 M/uL    Hemoglobin: 9.4 g/dL    Hematocrit: 30.3 %    Mean Cell Volume: 83.9 fL    Mean Cell Hemoglobin: 26.0 pg    Mean Cell Hemoglobin Conc: 31.0 g/dL    Red Cell Distrib Width: 14.7 %    Platelet Count - Automated: 322 K/uL    Blood Type:   HGB A1C:   Complete Blood Count (10.03.19 @ 23:42)    Hemoglobin: 9.4 g/dL    Mean Cell Hemoglobin: 26.0 pg    Mean Cell Hemoglobin Conc: 31.0 g/dL    Prealbumin: Prealbumin, Serum: 16 mg/dL (10-03 @ 08:46)    Pro-BNP: 57220  Thyroid Panel:   MRSA:  / MSSA: pending    Blood Gas Profile - Arterial (09.26.19 @ 07:20)    pH, Arterial: 7.46: room air    pCO2, Arterial: 26: room air mmHg    pO2, Arterial: 70: room air mmHg    HCO3, Arterial: 19: room air mmoL/L    Base Excess, Arterial: -4.3: room air mmoL/L    Oxygen Saturation, Arterial: 94: room air %    CXR: EXAM:  XR CHEST PORTABLE ROUTINE 1V            PROCEDURE DATE:  10/04/2019      INTERPRETATION:  Clinical History / Reason for exam: CHF    Comparison : Chest radiograph 10/3/2019.    Technique/Positioning: Single AP view of the chest.    Findings:    Cardiac/mediastinum/hilum: Stable.    Lung parenchyma/Pleura: No significant change in bibasilar opacities and   pleural effusions.    Skeleton/soft tissues: Stable.    Impression:    No significant change in bibasilar opacities and pleural effusions.      EKG: Ventricular Rate 63 BPM    Atrial Rate 63 BPM    P-R Interval 220 ms    QRS Duration 122 ms    Q-T Interval 412 ms    QTC Calculation(Bezet) 421 ms    P Axis 66 degrees    R Axis -28 degrees    T Axis -62 degrees    Diagnosis Line Sinus rhythm [id number] degree A-V block  Anterolateral infarct , age undetermined  Abnormal ECG    Confirmed by Joao Parnell [id number]) on 10/2/2019 9:06:55 AM    Carotid Duplex:  EXAM:  CAROTID DUPLEX BILATERAL            PROCEDURE DATE:  10/01/2019      INTERPRETATION:  Clinical History / Reason for exam: The patient is   68-year-old female preop for CABG with carotid bruit.  The study was   performed to evaluate thepatient for carotid artery stenosis.    Duplex evaluation of the carotid arteries was performed bilaterally.  In the right carotid system, the internal carotid artery is noted to be   patent with a peak systolic velocity of 63.8 cm/sec over end diastolic   velocity of 17.3 cm/sec.    In the left carotid system, the internal carotid artery is noted to be   patent with a peak systolic velocity of 95.8 cm/sec over end diastolic   velocity of 27.5 cm/sec.    The right vertebral arterial flow was antegrade.  The left vertebral arterial flow was antegrade.    Impression:    20-39% stenosis of the right internal carotid artery.  20-39% stenosis of the left internal carotid artery.      PFT's: FEV1 0.84, 37% predicted    Echocardiogram:    EXAM:  2-D ECHO (TTE) COMPLETE        PROCEDURE DATE:  10/01/2019      INTERPRETATION:  REPORT:    TRANSTHORACIC ECHOCARDIOGRAM REPORT         Patient Name:   GUS WILBURN Accession #: [id number]  Medical Rec #:  [id number]    Height:      62.0 in 157.5 cm  YOB: 1950  Weight:      125.0 lb 56.70 kg  Patient Age:    68 years    BSA:         1.57 m²  Patient Gender: F           BP:          116/62 mmHg       Date of Exam:        10/1/2019 3:08:28 PM  Referring Physician: [id number] ERNESTO CATALAN  Sonographer:         Marie sotelo  Reading Physician:   Sumeet Taylor M.D.    Procedure:   2D Echo/Doppler/Color Doppler Complete.  Indications: R07.9 - Chest Pain, unspecified  Diagnosis:   Chest pain, unspecified - R07.9     Summary:   1. Left ventricular ejection fraction, by visual estimation, is 25 to   30%.   2. Severely decreased global left ventricular systolic function.   3. Multiple left ventricular regional wall motion abnormalities exist.   See wall motion findings.   4. LV Ejection Fraction by Renner's Method with a biplane EF of 26 %.   5. Moderate to severely increased left ventricular internal cavity size.   6. Spectral Doppler shows restrictive pattern of left ventricular   myocardial filling (Grade III diastolic dysfunction).   7. Moderate pleural effusion in both left and right lateral regions.   8. Trivial pericardial effusion.   9. Mild mitral annular calcification.  10. Moderate to severe mitral valve regurgitation.  11. Sclerotic aortic valve with normal opening.  12. Estimated pulmonary artery systolic pressure is 53.9 mmHg assuming a   right atrial pressure of 5 mmHg, which is consistent with moderate   pulmonary hypertension.  13. LA volume Index is 36.1 ml/m² ml/m2.  14. There is mild aortic root calcification.    PHYSICIAN INTERPRETATION:  Left Ventricle: The left ventricular internal cavity size is moderate to   severely increased. Left ventricular wall thickness is normal. Global LV   systolic function was severely decreased. Left ventricular ejection   fraction, by visual estimation, is 25 to 30%. Spectral Doppler shows   restrictive pattern of left ventricular myocardial filling (Grade III   diastolic dysfunction).       LV Wall Scoring:  The entire inferior wall, mid and apical anterior septum, mid and apical  inferior septum, basal and mid inferolateral wall, and apex are akinetic.   The  basal inferoseptal segment is hypokinetic. All remaining scored segments   are  normal.    Right Ventricle: Normal right ventricular size and function.  Left Atrium: Moderately enlarged left atrium. LA volume Index is 36.1   ml/m² ml/m2.  Right Atrium: Normal right atrial size.  Pericardium: Trivial pericardial effusion is present. There is a moderate   pleural effusion in both left and right lateral regions.  Mitral Valve: Structurally normal mitral valve, with normal leaflet   excursion. The mitral valve is normal in structure. There is mild mitral   annular calcification. Moderate to severe mitral valve regurgitation is  seen.  Tricuspid Valve: Structurally normal tricuspid valve, with normal leaflet   excursion. The tricuspid valve is normal in structure. Mild tricuspid   regurgitation is visualized. Estimated pulmonary artery systolic pressure   is 53.9 mmHg assuming a right atrial pressure of 5 mmHg, which is   consistent with moderate pulmonary hypertension.  Aortic Valve: The aortic valve is trileaflet. Sclerotic aortic valve with   normal opening. Trivial aortic valve regurgitation is seen.  Pulmonic Valve: Structurally normal pulmonic valve, with normal leaflet   excursion. The pulmonic valve is normal. Mild pulmonic valve   regurgitation.  Aorta: Aortic root measured at Sinus of Valsalva is normal. There is mild   aortic root calcification.  Pulmonary Artery: The main pulmonary artery is normal in size.  Venous: The inferior vena cava was normal sized, with respiratory size   variation greater than 50%.       2D AND M-MODE MEASUREMENTS (normal ranges within parentheses):  Left                  Normal   Aorta/Left             Normal  Ventricle:                     Atrium:  IVSd (2D):  0.95 cm  (0.7-1.1) AoV Cusp       1.43  (1.5-2.6)  LVPWd (2D): 0.89 cm  (0.7-1.1) Separation:     cm  LVIDd (2D): 5.74 cm  (3.4-5.7) Left Atrium    4.14  (1.9-4.0)  LVIDs (2D): 5.01 cm            (Mmode):        cm  LV FS (2D):  12.6 %   (>25%)   Right  IVSd        0.88 cm  (0.7-1.1) Ventricle:  (Mmode):                       RVd (2D):      2.64 cm  LVPWd       0.84 cm  (0.7-1.1)  (Mmode):  LVIDd       6.27 cm  (3.4-5.7)  (Mmode):  LVIDs       5.54 cm  (Mmode):  LV FS        11.6 %   (>25%)  (Mmode):  Relative      0.31    (<0.42)  Wall  Thickness  Rel. Wall     0.27    (<0.42)  Thickness  Mm  LV Mass      140.6  Index:        g/m²  Mmode    SPECTRAL DOPPLER ANALYSIS:  LV DIASTOLIC FUNCTION:  MV Peak E: 1.27 m/s Decel Time: 132 msec  MV Peak A: 0.57 m/s  E/A Ratio: 2.24    Aortic Valve:  AoV VMax:    2.65 m/s  AoV Area, Vmax: 0.82 cm² Vmax Indx: 0.53 cm²/m²  AoV Pk Grad: 28.1 mmHg AoV Area, VTI:  0.96 cm² VTI Indx:  0.62 cm²/m²    LVOT Vmax: 0.75 m/s  LVOT VTI:  0.13 m  LVOT Diam: 1.92 cm    Mitral Valve:  MV P1/2 Time: 38.36 msec  MV Area, PHT: 5.74 cm²    Tricuspid Valve and PA/RV Systolic Pressure: TR Max Velocity: 3.50 m/s RA   Pressure: 5 mmHg RVSP/PASP: 53.9 mmHg       [id number] Sumeet aTylor M.D., Electronically signed on 10/1/2019 at 5:28:14   PM     *** Final ***  EXAM:  CT ANGIO CHEST (W)AW IC            PROCEDURE DATE:  09/21/2019      INTERPRETATION:  CLINICAL HISTORY / REASON FOR EXAM: Shortness of breath    TECHNIQUE: Multislice helical sections were obtained from the thoracic   inlet to the lung bases during rapid administration of 80 mL Optiray 320   intravenous contrast using a CTA protocol. Thin sections were   reconstructed through the pulmonary vasculature. Coronal, sagittal and   3D/MIP reformatted images are also submitted. Breath motion artifact is   present.    COMPARISON CT: None.    OTHER STUDIES USED FOR CORRELATION: Chest radiograph from 9/3/2019.      FINDINGS:    PULMONARY EMBOLUS: No central or segmental pulmonary embolus.    TUBES/LINES: None.    LUNGS, PLEURA, AIRWAYS: Large right-sided pleural effusion with adjacent   atelectasis. Moderate left-sided pleural effusion with adjacent   atelectasis. Small amount of fluid tracking along the minor fissure. No   focal consolidation.    THORACIC NODES: No mediastinal or axillary lymphadenopathy.    MEDIASTINUM/GREAT VESSELS: No pericardial effusion. Cardiomegaly.   Coronary artery and thoracic aorta atherosclerotic calcifications. No   aneurysmal dilation of the thoracic aorta.    VISUALIZED UPPER ABDOMEN: Calcified splenic artery aneurysm measuring up   to 1.5 cm.    BONES/SOFT TISSUES: Degenerative changes of the thoracic spine.    IMPRESSION:     Large right and moderate left pleural effusions with adjacent   atelectasis. No focal consolidation.    No evidence of central or segmental pulmonary embolism.    Calcified splenic artery aneurysm measuring approximately 5 cm.    Cardiac catheterization:  CATH SUMMARY/FINDINGS:    Dominance:   [x] Right  [ ] Left                  LM:    ostial LM: 85% stenosis    LAD:                        prox LAD: minor irregularities  mid LAD: 60% tubular stenosis   distal LAD: minor irregularities    Diag:   D1: meidum sized, minor irregularities  D2: small sized, mild atherosclerosis    Cx:  prox LCX: mild atherosclerosis  mid LCX: 90% stenosis just after OM1  distal LCX: small sized, minor irregularities    OM:  OM1: large sized vessel, 80% stenosis in the proximal third of the vessel    RCA:  prox RCA: minor irregularities  mid RCA: 95% stenosis  distal RCA: mild atherosclerosis    RPDA: mild atherosclerosis      Verify now from 10/04-   134    Gen: WN/WD NAD  Neuro: A&Ox3, nonfocal  Pulm: CTA B/L  CV: RRR, S1S2 +systolic murmur  Abd: Soft, NT, ND +BS  Ext: No edema, + peripheral pulses    Cardiac Surgery Risk Factors  CVA and/or carotid/cerebrovascular disease. Yes  No  Explain if Yes  Aortoiliac disease Yes  No  Explain if Yes  Previous MI Yes  No  Explain if Yes  Previos Cardiac Surgery Yes  No  Explain if Yes ruled in for nstemi on 9/21  Hemodynamics-Unstable or Shock Yes  No  Explain if Yes  Diabetis Yes  No  Explain if Yes- on insulin gtt now and on oral agents at home  Hepatic Failure Yes  No  Explain if Yes  Renal failure and/or dialysis Yes  No  Explain if Yes  Heart failure-type-present or past Yes  No  Explain if Yes  COPD Yes  No  Explain if Yes -- fev1 37% predicted   Immune System Deficiency Yes  No  Explain if Yes  Malignant Ventricular Arrhythmia Yes  No  Explain if Yes  Family hx pos for CAD; dad had MI at age of 48  CHF- acute EF of 25%    STS Score: Risk of Mortality:  8.239%  Renal Failure:  5.807%  Permanent Stroke:  2.609%  Prolonged Ventilation:  26.966%  DSW Infection:  0.267%  Reoperation:  3.769%  Morbidity or Mortality:  35.542%  Short Length of Stay:  16.305%  Long Length of Stay:  15.466%      Pt has AICD/PPM [ ] Yes  [x ] No             Brand Name:  Pre-op Beta Blocker ordered within 24 hrs of surgery (CABG ONLY)?  [x ] Yes  [ ] No  If not, Why?  Type & Cross  [x ] Yes  [ ] No  NPO after Midnight [x ] Yes  [ ] No  Pre-op ABX ordered, to be taped on chart:  [ x] Yes  [ ] No     Hibiclens/Peridex ordered [x ] Yes  [ ] No  Intraop on Hold: PRBCs, CXR, JAMILA [x ]   Consent obtained  [x ] Yes  [ ] No    A/P- Patient is a 67 yo female with left main multi-vessel CAD pre-op for CABG to be scheduled in the am  cont present tx as per CT surgeon/card  NPO after midnight  consent obtained Cardiac Surgery Pre-op Note:  CC: Patient is a 68y old  Female who presents with a chief complaint of cad (05 Oct 2019 13:26)      Referring Physician: brian                                                                                            Surgeon: yuli  Procedure: (Date) (Procedure)    Allergies    No Known Allergies    Intolerances      HPI:  68F with PMHx of uncontrolled DM (non-compliant), viral myopathy, Paroxysmal Afib, recent labial abscess s/p ID, hip fracture s/p fixation, recent LGIB bleed now presents for shortness of breath. Patient has been having shortness of breath for the last day. Occurs on exertion and stationary however no worsening of breathing when laying down. Patient denied abdominal pain however family reports she has been having some abdominal discomfort over the last day. Patient also has an indwelling montejo for the last 3 weeks for urinary retention. Reports cough with no sputum production. Denies chest pain, fever, nausea/vomiting, diarrhea. Cardiac enzymes ruled in for NSTEMI.  Cardiac catheterization demonstrated triple vessel LM disease.      PAST MEDICAL & SURGICAL HISTORY:  Female bladder prolapse  Diabetes  History of repair of hip fracture      MEDICATIONS  (STANDING):  ALBUTerol/ipratropium for Nebulization 3 milliLiter(s) Nebulizer every 6 hours  ALPRAZolam 0.5 milliGRAM(s) Oral two times a day  amiodarone    Tablet 200 milliGRAM(s) Oral daily  aspirin  chewable 81 milliGRAM(s) Oral daily  atorvastatin 80 milliGRAM(s) Oral daily  chlorhexidine 4% Liquid 1 Application(s) Topical <User Schedule>  dextrose 5%. 1000 milliLiter(s) (50 mL/Hr) IV Continuous <Continuous>  dextrose 50% Injectable 12.5 Gram(s) IV Push once  dextrose 50% Injectable 25 Gram(s) IV Push once  dextrose 50% Injectable 25 Gram(s) IV Push once  gabapentin 100 milliGRAM(s) Oral three times a day  guaiFENesin  milliGRAM(s) Oral every 12 hours  insulin regular Infusion 1 Unit(s)/Hr (1 mL/Hr) IV Continuous <Continuous>  lidocaine   Patch 2 Patch Transdermal daily  melatonin 3 milliGRAM(s) Oral at bedtime  metoprolol tartrate 12.5 milliGRAM(s) Oral once  ondansetron Injectable 4 milliGRAM(s) IV Push once  pantoprazole    Tablet 40 milliGRAM(s) Oral before breakfast    MEDICATIONS  (PRN):  dextrose 40% Gel 15 Gram(s) Oral once PRN Blood Glucose LESS THAN 70 milliGRAM(s)/deciliter  glucagon  Injectable 1 milliGRAM(s) IntraMuscular once PRN Glucose LESS THAN 70 milligrams/deciliter  temazepam 15 milliGRAM(s) Oral at bedtime PRN Insomnia      Labs:    10-06    140  |  104  |  54<H>  ----------------------------<  158<H>  5.2<H>   |  25  |  1.3    Ca    9.3      06 Oct 2019 02:00    Complete Blood Count (10.03.19 @ 23:42)    Nucleated RBC: 0 /100 WBCs    WBC Count: 8.68 K/uL    RBC Count: 3.61 M/uL    Hemoglobin: 9.4 g/dL    Hematocrit: 30.3 %    Mean Cell Volume: 83.9 fL    Mean Cell Hemoglobin: 26.0 pg    Mean Cell Hemoglobin Conc: 31.0 g/dL    Red Cell Distrib Width: 14.7 %    Platelet Count - Automated: 322 K/uL    Blood Type:   HGB A1C:   Complete Blood Count (10.03.19 @ 23:42)    Hemoglobin: 9.4 g/dL    Mean Cell Hemoglobin: 26.0 pg    Mean Cell Hemoglobin Conc: 31.0 g/dL    Prealbumin: Prealbumin, Serum: 16 mg/dL (10-03 @ 08:46)    Pro-BNP: 70737  Thyroid Panel:   MRSA:  / MSSA: pending    Blood Gas Profile - Arterial (09.26.19 @ 07:20)    pH, Arterial: 7.46: room air    pCO2, Arterial: 26: room air mmHg    pO2, Arterial: 70: room air mmHg    HCO3, Arterial: 19: room air mmoL/L    Base Excess, Arterial: -4.3: room air mmoL/L    Oxygen Saturation, Arterial: 94: room air %    CXR: EXAM:  XR CHEST PORTABLE ROUTINE 1V            PROCEDURE DATE:  10/04/2019      INTERPRETATION:  Clinical History / Reason for exam: CHF    Comparison : Chest radiograph 10/3/2019.    Technique/Positioning: Single AP view of the chest.    Findings:    Cardiac/mediastinum/hilum: Stable.    Lung parenchyma/Pleura: No significant change in bibasilar opacities and   pleural effusions.    Skeleton/soft tissues: Stable.    Impression:    No significant change in bibasilar opacities and pleural effusions.      EKG: Ventricular Rate 63 BPM    Atrial Rate 63 BPM    P-R Interval 220 ms    QRS Duration 122 ms    Q-T Interval 412 ms    QTC Calculation(Bezet) 421 ms    P Axis 66 degrees    R Axis -28 degrees    T Axis -62 degrees    Diagnosis Line Sinus rhythm [id number] degree A-V block  Anterolateral infarct , age undetermined  Abnormal ECG    Confirmed by Joao Parnell [id number]) on 10/2/2019 9:06:55 AM    Carotid Duplex:  EXAM:  CAROTID DUPLEX BILATERAL            PROCEDURE DATE:  10/01/2019      INTERPRETATION:  Clinical History / Reason for exam: The patient is   68-year-old female preop for CABG with carotid bruit.  The study was   performed to evaluate thepatient for carotid artery stenosis.    Duplex evaluation of the carotid arteries was performed bilaterally.  In the right carotid system, the internal carotid artery is noted to be   patent with a peak systolic velocity of 63.8 cm/sec over end diastolic   velocity of 17.3 cm/sec.    In the left carotid system, the internal carotid artery is noted to be   patent with a peak systolic velocity of 95.8 cm/sec over end diastolic   velocity of 27.5 cm/sec.    The right vertebral arterial flow was antegrade.  The left vertebral arterial flow was antegrade.    Impression:    20-39% stenosis of the right internal carotid artery.  20-39% stenosis of the left internal carotid artery.      PFT's: FEV1 0.84, 37% predicted    Echocardiogram:    EXAM:  2-D ECHO (TTE) COMPLETE        PROCEDURE DATE:  10/01/2019      INTERPRETATION:  REPORT:    TRANSTHORACIC ECHOCARDIOGRAM REPORT         Patient Name:   GUS WILBURN Accession #: [id number]  Medical Rec #:  [id number]    Height:      62.0 in 157.5 cm  YOB: 1950  Weight:      125.0 lb 56.70 kg  Patient Age:    68 years    BSA:         1.57 m²  Patient Gender: F           BP:          116/62 mmHg       Date of Exam:        10/1/2019 3:08:28 PM  Referring Physician: [id number] ERNESTO CATALAN  Sonographer:         Marie sotelo  Reading Physician:   Sumeet Taylor M.D.    Procedure:   2D Echo/Doppler/Color Doppler Complete.  Indications: R07.9 - Chest Pain, unspecified  Diagnosis:   Chest pain, unspecified - R07.9     Summary:   1. Left ventricular ejection fraction, by visual estimation, is 25 to   30%.   2. Severely decreased global left ventricular systolic function.   3. Multiple left ventricular regional wall motion abnormalities exist.   See wall motion findings.   4. LV Ejection Fraction by Renner's Method with a biplane EF of 26 %.   5. Moderate to severely increased left ventricular internal cavity size.   6. Spectral Doppler shows restrictive pattern of left ventricular   myocardial filling (Grade III diastolic dysfunction).   7. Moderate pleural effusion in both left and right lateral regions.   8. Trivial pericardial effusion.   9. Mild mitral annular calcification.  10. Moderate to severe mitral valve regurgitation.  11. Sclerotic aortic valve with normal opening.  12. Estimated pulmonary artery systolic pressure is 53.9 mmHg assuming a   right atrial pressure of 5 mmHg, which is consistent with moderate   pulmonary hypertension.  13. LA volume Index is 36.1 ml/m² ml/m2.  14. There is mild aortic root calcification.    PHYSICIAN INTERPRETATION:  Left Ventricle: The left ventricular internal cavity size is moderate to   severely increased. Left ventricular wall thickness is normal. Global LV   systolic function was severely decreased. Left ventricular ejection   fraction, by visual estimation, is 25 to 30%. Spectral Doppler shows   restrictive pattern of left ventricular myocardial filling (Grade III   diastolic dysfunction).       LV Wall Scoring:  The entire inferior wall, mid and apical anterior septum, mid and apical  inferior septum, basal and mid inferolateral wall, and apex are akinetic.   The  basal inferoseptal segment is hypokinetic. All remaining scored segments   are  normal.    Right Ventricle: Normal right ventricular size and function.  Left Atrium: Moderately enlarged left atrium. LA volume Index is 36.1   ml/m² ml/m2.  Right Atrium: Normal right atrial size.  Pericardium: Trivial pericardial effusion is present. There is a moderate   pleural effusion in both left and right lateral regions.  Mitral Valve: Structurally normal mitral valve, with normal leaflet   excursion. The mitral valve is normal in structure. There is mild mitral   annular calcification. Moderate to severe mitral valve regurgitation is  seen.  Tricuspid Valve: Structurally normal tricuspid valve, with normal leaflet   excursion. The tricuspid valve is normal in structure. Mild tricuspid   regurgitation is visualized. Estimated pulmonary artery systolic pressure   is 53.9 mmHg assuming a right atrial pressure of 5 mmHg, which is   consistent with moderate pulmonary hypertension.  Aortic Valve: The aortic valve is trileaflet. Sclerotic aortic valve with   normal opening. Trivial aortic valve regurgitation is seen.  Pulmonic Valve: Structurally normal pulmonic valve, with normal leaflet   excursion. The pulmonic valve is normal. Mild pulmonic valve   regurgitation.  Aorta: Aortic root measured at Sinus of Valsalva is normal. There is mild   aortic root calcification.  Pulmonary Artery: The main pulmonary artery is normal in size.  Venous: The inferior vena cava was normal sized, with respiratory size   variation greater than 50%.       2D AND M-MODE MEASUREMENTS (normal ranges within parentheses):  Left                  Normal   Aorta/Left             Normal  Ventricle:                     Atrium:  IVSd (2D):  0.95 cm  (0.7-1.1) AoV Cusp       1.43  (1.5-2.6)  LVPWd (2D): 0.89 cm  (0.7-1.1) Separation:     cm  LVIDd (2D): 5.74 cm  (3.4-5.7) Left Atrium    4.14  (1.9-4.0)  LVIDs (2D): 5.01 cm            (Mmode):        cm  LV FS (2D):  12.6 %   (>25%)   Right  IVSd        0.88 cm  (0.7-1.1) Ventricle:  (Mmode):                       RVd (2D):      2.64 cm  LVPWd       0.84 cm  (0.7-1.1)  (Mmode):  LVIDd       6.27 cm  (3.4-5.7)  (Mmode):  LVIDs       5.54 cm  (Mmode):  LV FS        11.6 %   (>25%)  (Mmode):  Relative      0.31    (<0.42)  Wall  Thickness  Rel. Wall     0.27    (<0.42)  Thickness  Mm  LV Mass      140.6  Index:        g/m²  Mmode    SPECTRAL DOPPLER ANALYSIS:  LV DIASTOLIC FUNCTION:  MV Peak E: 1.27 m/s Decel Time: 132 msec  MV Peak A: 0.57 m/s  E/A Ratio: 2.24    Aortic Valve:  AoV VMax:    2.65 m/s  AoV Area, Vmax: 0.82 cm² Vmax Indx: 0.53 cm²/m²  AoV Pk Grad: 28.1 mmHg AoV Area, VTI:  0.96 cm² VTI Indx:  0.62 cm²/m²    LVOT Vmax: 0.75 m/s  LVOT VTI:  0.13 m  LVOT Diam: 1.92 cm    Mitral Valve:  MV P1/2 Time: 38.36 msec  MV Area, PHT: 5.74 cm²    Tricuspid Valve and PA/RV Systolic Pressure: TR Max Velocity: 3.50 m/s RA   Pressure: 5 mmHg RVSP/PASP: 53.9 mmHg       [id number] Sumeet Taylor M.D., Electronically signed on 10/1/2019 at 5:28:14   PM     *** Final ***  EXAM:  CT ANGIO CHEST (W)AW IC            PROCEDURE DATE:  09/21/2019      INTERPRETATION:  CLINICAL HISTORY / REASON FOR EXAM: Shortness of breath    TECHNIQUE: Multislice helical sections were obtained from the thoracic   inlet to the lung bases during rapid administration of 80 mL Optiray 320   intravenous contrast using a CTA protocol. Thin sections were   reconstructed through the pulmonary vasculature. Coronal, sagittal and   3D/MIP reformatted images are also submitted. Breath motion artifact is   present.    COMPARISON CT: None.    OTHER STUDIES USED FOR CORRELATION: Chest radiograph from 9/3/2019.      FINDINGS:    PULMONARY EMBOLUS: No central or segmental pulmonary embolus.    TUBES/LINES: None.    LUNGS, PLEURA, AIRWAYS: Large right-sided pleural effusion with adjacent   atelectasis. Moderate left-sided pleural effusion with adjacent   atelectasis. Small amount of fluid tracking along the minor fissure. No   focal consolidation.    THORACIC NODES: No mediastinal or axillary lymphadenopathy.    MEDIASTINUM/GREAT VESSELS: No pericardial effusion. Cardiomegaly.   Coronary artery and thoracic aorta atherosclerotic calcifications. No   aneurysmal dilation of the thoracic aorta.    VISUALIZED UPPER ABDOMEN: Calcified splenic artery aneurysm measuring up   to 1.5 cm.    BONES/SOFT TISSUES: Degenerative changes of the thoracic spine.    IMPRESSION:     Large right and moderate left pleural effusions with adjacent   atelectasis. No focal consolidation.    No evidence of central or segmental pulmonary embolism.    Calcified splenic artery aneurysm measuring approximately 5 cm.    Cardiac catheterization:  CATH SUMMARY/FINDINGS:    Dominance:   [x] Right  [ ] Left                  LM:    ostial LM: 85% stenosis    LAD:                        prox LAD: minor irregularities  mid LAD: 60% tubular stenosis   distal LAD: minor irregularities    Diag:   D1: meidum sized, minor irregularities  D2: small sized, mild atherosclerosis    Cx:  prox LCX: mild atherosclerosis  mid LCX: 90% stenosis just after OM1  distal LCX: small sized, minor irregularities    OM:  OM1: large sized vessel, 80% stenosis in the proximal third of the vessel    RCA:  prox RCA: minor irregularities  mid RCA: 95% stenosis  distal RCA: mild atherosclerosis    RPDA: mild atherosclerosis      Verify now from 10/04-   134  5 meter walk test- n/a being pt uses walker anyway for ambulation assistance due to neuropathy  b/l BP right 108/55, left   Gen: WN/WD NAD  Neuro: A&Ox3, nonfocal  Pulm: CTA B/L  CV: RRR, S1S2 +systolic murmur  Abd: Soft, NT, ND +BS  Ext: No edema, + peripheral pulses    Cardiac Surgery Risk Factors  CVA and/or carotid/cerebrovascular disease. Yes  No  Explain if Yes  Aortoiliac disease Yes  No  Explain if Yes  Previous MI Yes  No  Explain if Yes  Previos Cardiac Surgery Yes  No  Explain if Yes ruled in for nstemi on 9/21  Hemodynamics-Unstable or Shock Yes  No  Explain if Yes  Diabetis Yes  No  Explain if Yes- on insulin gtt now and on oral agents at home  Hepatic Failure Yes  No  Explain if Yes  Renal failure and/or dialysis Yes  No  Explain if Yes  Heart failure-type-present or past Yes  No  Explain if Yes  COPD Yes  No  Explain if Yes -- fev1 37% predicted   Immune System Deficiency Yes  No  Explain if Yes  Malignant Ventricular Arrhythmia Yes  No  Explain if Yes  Family hx pos for CAD; dad had MI at age of 48  CHF- acute EF of 25%    STS Score: Risk of Mortality:  8.239%  Renal Failure:  5.807%  Permanent Stroke:  2.609%  Prolonged Ventilation:  26.966%  DSW Infection:  0.267%  Reoperation:  3.769%  Morbidity or Mortality:  35.542%  Short Length of Stay:  16.305%  Long Length of Stay:  15.466%      Pt has AICD/PPM [ ] Yes  [x ] No             Brand Name:  Pre-op Beta Blocker ordered within 24 hrs of surgery (CABG ONLY)?  [x ] Yes  [ ] No  If not, Why?  Type & Cross  [x ] Yes  [ ] No  NPO after Midnight [x ] Yes  [ ] No  Pre-op ABX ordered, to be taped on chart:  [ x] Yes  [ ] No     Hibiclens/Peridex ordered [x ] Yes  [ ] No  Intraop on Hold: PRBCs, CXR, JAMILA [x ]   Consent obtained  [x ] Yes  [ ] No    A/P- Patient is a 69 yo female with left main multi-vessel CAD pre-op for CABG to be scheduled in the am  cont present tx as per CT surgeon/card  NPO after midnight  consent obtained Cardiac Surgery Pre-op Note:  CC: Patient is a 68y old  Female who presents with a chief complaint of cad (05 Oct 2019 13:26)      Referring Physician: brian                                                                                            Surgeon: yuli  Procedure: (Date) (Procedure)    Allergies    No Known Allergies    Intolerances      HPI:  68F with PMHx of uncontrolled DM (non-compliant), viral myopathy, Paroxysmal Afib, recent labial abscess s/p ID, hip fracture s/p fixation, recent LGIB bleed now presents for shortness of breath. Patient has been having shortness of breath for the last day. Occurs on exertion and stationary however no worsening of breathing when laying down. Patient denied abdominal pain however family reports she has been having some abdominal discomfort over the last day. Patient also has an indwelling montejo for the last 3 weeks for urinary retention. Reports cough with no sputum production. Denies chest pain, fever, nausea/vomiting, diarrhea. Cardiac enzymes ruled in for NSTEMI.  Cardiac catheterization demonstrated triple vessel LM disease.      PAST MEDICAL & SURGICAL HISTORY:  Female bladder prolapse  Diabetes  History of repair of hip fracture      MEDICATIONS  (STANDING):  ALBUTerol/ipratropium for Nebulization 3 milliLiter(s) Nebulizer every 6 hours  ALPRAZolam 0.5 milliGRAM(s) Oral two times a day  amiodarone    Tablet 200 milliGRAM(s) Oral daily  aspirin  chewable 81 milliGRAM(s) Oral daily  atorvastatin 80 milliGRAM(s) Oral daily  chlorhexidine 4% Liquid 1 Application(s) Topical <User Schedule>  dextrose 5%. 1000 milliLiter(s) (50 mL/Hr) IV Continuous <Continuous>  dextrose 50% Injectable 12.5 Gram(s) IV Push once  dextrose 50% Injectable 25 Gram(s) IV Push once  dextrose 50% Injectable 25 Gram(s) IV Push once  gabapentin 100 milliGRAM(s) Oral three times a day  guaiFENesin  milliGRAM(s) Oral every 12 hours  insulin regular Infusion 1 Unit(s)/Hr (1 mL/Hr) IV Continuous <Continuous>  lidocaine   Patch 2 Patch Transdermal daily  melatonin 3 milliGRAM(s) Oral at bedtime  metoprolol tartrate 12.5 milliGRAM(s) Oral once  ondansetron Injectable 4 milliGRAM(s) IV Push once  pantoprazole    Tablet 40 milliGRAM(s) Oral before breakfast    MEDICATIONS  (PRN):  dextrose 40% Gel 15 Gram(s) Oral once PRN Blood Glucose LESS THAN 70 milliGRAM(s)/deciliter  glucagon  Injectable 1 milliGRAM(s) IntraMuscular once PRN Glucose LESS THAN 70 milligrams/deciliter  temazepam 15 milliGRAM(s) Oral at bedtime PRN Insomnia      Labs:    10-06    140  |  104  |  54<H>  ----------------------------<  158<H>  5.2<H>   |  25  |  1.3    Ca    9.3      06 Oct 2019 02:00    Complete Blood Count (10.03.19 @ 23:42)    Nucleated RBC: 0 /100 WBCs    WBC Count: 8.68 K/uL    RBC Count: 3.61 M/uL    Hemoglobin: 9.4 g/dL    Hematocrit: 30.3 %    Mean Cell Volume: 83.9 fL    Mean Cell Hemoglobin: 26.0 pg    Mean Cell Hemoglobin Conc: 31.0 g/dL    Red Cell Distrib Width: 14.7 %    Platelet Count - Automated: 322 K/uL    Blood Type:   HGB A1C:   Complete Blood Count (10.03.19 @ 23:42)    Hemoglobin: 9.4 g/dL    Mean Cell Hemoglobin: 26.0 pg    Mean Cell Hemoglobin Conc: 31.0 g/dL    Prealbumin: Prealbumin, Serum: 16 mg/dL (10-03 @ 08:46)    Pro-BNP: 05193  Thyroid Panel:   MRSA:  / MSSA: pending    Blood Gas Profile - Arterial (09.26.19 @ 07:20)    pH, Arterial: 7.46: room air    pCO2, Arterial: 26: room air mmHg    pO2, Arterial: 70: room air mmHg    HCO3, Arterial: 19: room air mmoL/L    Base Excess, Arterial: -4.3: room air mmoL/L    Oxygen Saturation, Arterial: 94: room air %    CXR: EXAM:  XR CHEST PORTABLE ROUTINE 1V            PROCEDURE DATE:  10/04/2019      INTERPRETATION:  Clinical History / Reason for exam: CHF    Comparison : Chest radiograph 10/3/2019.    Technique/Positioning: Single AP view of the chest.    Findings:    Cardiac/mediastinum/hilum: Stable.    Lung parenchyma/Pleura: No significant change in bibasilar opacities and   pleural effusions.    Skeleton/soft tissues: Stable.    Impression:    No significant change in bibasilar opacities and pleural effusions.      EKG: Ventricular Rate 63 BPM    Atrial Rate 63 BPM    P-R Interval 220 ms    QRS Duration 122 ms    Q-T Interval 412 ms    QTC Calculation(Bezet) 421 ms    P Axis 66 degrees    R Axis -28 degrees    T Axis -62 degrees    Diagnosis Line Sinus rhythm [id number] degree A-V block  Anterolateral infarct , age undetermined  Abnormal ECG    Confirmed by Joao Parnell [id number]) on 10/2/2019 9:06:55 AM    Carotid Duplex:  EXAM:  CAROTID DUPLEX BILATERAL            PROCEDURE DATE:  10/01/2019      INTERPRETATION:  Clinical History / Reason for exam: The patient is   68-year-old female preop for CABG with carotid bruit.  The study was   performed to evaluate thepatient for carotid artery stenosis.    Duplex evaluation of the carotid arteries was performed bilaterally.  In the right carotid system, the internal carotid artery is noted to be   patent with a peak systolic velocity of 63.8 cm/sec over end diastolic   velocity of 17.3 cm/sec.    In the left carotid system, the internal carotid artery is noted to be   patent with a peak systolic velocity of 95.8 cm/sec over end diastolic   velocity of 27.5 cm/sec.    The right vertebral arterial flow was antegrade.  The left vertebral arterial flow was antegrade.    Impression:    20-39% stenosis of the right internal carotid artery.  20-39% stenosis of the left internal carotid artery.      PFT's: FEV1 0.84, 37% predicted    Echocardiogram:    EXAM:  2-D ECHO (TTE) COMPLETE        PROCEDURE DATE:  10/01/2019      INTERPRETATION:  REPORT:    TRANSTHORACIC ECHOCARDIOGRAM REPORT         Patient Name:   GUS WILBURN Accession #: [id number]  Medical Rec #:  [id number]    Height:      62.0 in 157.5 cm  YOB: 1950  Weight:      125.0 lb 56.70 kg  Patient Age:    68 years    BSA:         1.57 m²  Patient Gender: F           BP:          116/62 mmHg       Date of Exam:        10/1/2019 3:08:28 PM  Referring Physician: [id number] ERNESTO CATALAN  Sonographer:         Marie sotelo  Reading Physician:   Sumeet Taylor M.D.    Procedure:   2D Echo/Doppler/Color Doppler Complete.  Indications: R07.9 - Chest Pain, unspecified  Diagnosis:   Chest pain, unspecified - R07.9     Summary:   1. Left ventricular ejection fraction, by visual estimation, is 25 to   30%.   2. Severely decreased global left ventricular systolic function.   3. Multiple left ventricular regional wall motion abnormalities exist.   See wall motion findings.   4. LV Ejection Fraction by Renner's Method with a biplane EF of 26 %.   5. Moderate to severely increased left ventricular internal cavity size.   6. Spectral Doppler shows restrictive pattern of left ventricular   myocardial filling (Grade III diastolic dysfunction).   7. Moderate pleural effusion in both left and right lateral regions.   8. Trivial pericardial effusion.   9. Mild mitral annular calcification.  10. Moderate to severe mitral valve regurgitation.  11. Sclerotic aortic valve with normal opening.  12. Estimated pulmonary artery systolic pressure is 53.9 mmHg assuming a   right atrial pressure of 5 mmHg, which is consistent with moderate   pulmonary hypertension.  13. LA volume Index is 36.1 ml/m² ml/m2.  14. There is mild aortic root calcification.    PHYSICIAN INTERPRETATION:  Left Ventricle: The left ventricular internal cavity size is moderate to   severely increased. Left ventricular wall thickness is normal. Global LV   systolic function was severely decreased. Left ventricular ejection   fraction, by visual estimation, is 25 to 30%. Spectral Doppler shows   restrictive pattern of left ventricular myocardial filling (Grade III   diastolic dysfunction).       LV Wall Scoring:  The entire inferior wall, mid and apical anterior septum, mid and apical  inferior septum, basal and mid inferolateral wall, and apex are akinetic.   The  basal inferoseptal segment is hypokinetic. All remaining scored segments   are  normal.    Right Ventricle: Normal right ventricular size and function.  Left Atrium: Moderately enlarged left atrium. LA volume Index is 36.1   ml/m² ml/m2.  Right Atrium: Normal right atrial size.  Pericardium: Trivial pericardial effusion is present. There is a moderate   pleural effusion in both left and right lateral regions.  Mitral Valve: Structurally normal mitral valve, with normal leaflet   excursion. The mitral valve is normal in structure. There is mild mitral   annular calcification. Moderate to severe mitral valve regurgitation is  seen.  Tricuspid Valve: Structurally normal tricuspid valve, with normal leaflet   excursion. The tricuspid valve is normal in structure. Mild tricuspid   regurgitation is visualized. Estimated pulmonary artery systolic pressure   is 53.9 mmHg assuming a right atrial pressure of 5 mmHg, which is   consistent with moderate pulmonary hypertension.  Aortic Valve: The aortic valve is trileaflet. Sclerotic aortic valve with   normal opening. Trivial aortic valve regurgitation is seen.  Pulmonic Valve: Structurally normal pulmonic valve, with normal leaflet   excursion. The pulmonic valve is normal. Mild pulmonic valve   regurgitation.  Aorta: Aortic root measured at Sinus of Valsalva is normal. There is mild   aortic root calcification.  Pulmonary Artery: The main pulmonary artery is normal in size.  Venous: The inferior vena cava was normal sized, with respiratory size   variation greater than 50%.       2D AND M-MODE MEASUREMENTS (normal ranges within parentheses):  Left                  Normal   Aorta/Left             Normal  Ventricle:                     Atrium:  IVSd (2D):  0.95 cm  (0.7-1.1) AoV Cusp       1.43  (1.5-2.6)  LVPWd (2D): 0.89 cm  (0.7-1.1) Separation:     cm  LVIDd (2D): 5.74 cm  (3.4-5.7) Left Atrium    4.14  (1.9-4.0)  LVIDs (2D): 5.01 cm            (Mmode):        cm  LV FS (2D):  12.6 %   (>25%)   Right  IVSd        0.88 cm  (0.7-1.1) Ventricle:  (Mmode):                       RVd (2D):      2.64 cm  LVPWd       0.84 cm  (0.7-1.1)  (Mmode):  LVIDd       6.27 cm  (3.4-5.7)  (Mmode):  LVIDs       5.54 cm  (Mmode):  LV FS        11.6 %   (>25%)  (Mmode):  Relative      0.31    (<0.42)  Wall  Thickness  Rel. Wall     0.27    (<0.42)  Thickness  Mm  LV Mass      140.6  Index:        g/m²  Mmode    SPECTRAL DOPPLER ANALYSIS:  LV DIASTOLIC FUNCTION:  MV Peak E: 1.27 m/s Decel Time: 132 msec  MV Peak A: 0.57 m/s  E/A Ratio: 2.24    Aortic Valve:  AoV VMax:    2.65 m/s  AoV Area, Vmax: 0.82 cm² Vmax Indx: 0.53 cm²/m²  AoV Pk Grad: 28.1 mmHg AoV Area, VTI:  0.96 cm² VTI Indx:  0.62 cm²/m²    LVOT Vmax: 0.75 m/s  LVOT VTI:  0.13 m  LVOT Diam: 1.92 cm    Mitral Valve:  MV P1/2 Time: 38.36 msec  MV Area, PHT: 5.74 cm²    Tricuspid Valve and PA/RV Systolic Pressure: TR Max Velocity: 3.50 m/s RA   Pressure: 5 mmHg RVSP/PASP: 53.9 mmHg       [id number] Sumeet Taylor M.D., Electronically signed on 10/1/2019 at 5:28:14   PM     *** Final ***  EXAM:  CT ANGIO CHEST (W)AW IC            PROCEDURE DATE:  09/21/2019      INTERPRETATION:  CLINICAL HISTORY / REASON FOR EXAM: Shortness of breath    TECHNIQUE: Multislice helical sections were obtained from the thoracic   inlet to the lung bases during rapid administration of 80 mL Optiray 320   intravenous contrast using a CTA protocol. Thin sections were   reconstructed through the pulmonary vasculature. Coronal, sagittal and   3D/MIP reformatted images are also submitted. Breath motion artifact is   present.    COMPARISON CT: None.    OTHER STUDIES USED FOR CORRELATION: Chest radiograph from 9/3/2019.      FINDINGS:    PULMONARY EMBOLUS: No central or segmental pulmonary embolus.    TUBES/LINES: None.    LUNGS, PLEURA, AIRWAYS: Large right-sided pleural effusion with adjacent   atelectasis. Moderate left-sided pleural effusion with adjacent   atelectasis. Small amount of fluid tracking along the minor fissure. No   focal consolidation.    THORACIC NODES: No mediastinal or axillary lymphadenopathy.    MEDIASTINUM/GREAT VESSELS: No pericardial effusion. Cardiomegaly.   Coronary artery and thoracic aorta atherosclerotic calcifications. No   aneurysmal dilation of the thoracic aorta.    VISUALIZED UPPER ABDOMEN: Calcified splenic artery aneurysm measuring up   to 1.5 cm.    BONES/SOFT TISSUES: Degenerative changes of the thoracic spine.    IMPRESSION:     Large right and moderate left pleural effusions with adjacent   atelectasis. No focal consolidation.    No evidence of central or segmental pulmonary embolism.    Calcified splenic artery aneurysm measuring approximately 5 cm.    Cardiac catheterization:  CATH SUMMARY/FINDINGS:    Dominance:   [x] Right  [ ] Left                  LM:    ostial LM: 85% stenosis    LAD:                        prox LAD: minor irregularities  mid LAD: 60% tubular stenosis   distal LAD: minor irregularities    Diag:   D1: meidum sized, minor irregularities  D2: small sized, mild atherosclerosis    Cx:  prox LCX: mild atherosclerosis  mid LCX: 90% stenosis just after OM1  distal LCX: small sized, minor irregularities    OM:  OM1: large sized vessel, 80% stenosis in the proximal third of the vessel    RCA:  prox RCA: minor irregularities  mid RCA: 95% stenosis  distal RCA: mild atherosclerosis    RPDA: mild atherosclerosis      Verify now from 10/04-   134  5 meter walk test- n/a being pt uses walker anyway for ambulation assistance due to neuropathy  b/l BP right 108/55, left 128/56  Gen: WN/WD NAD  Neuro: A&Ox3, nonfocal  Pulm: CTA B/L  CV: RRR, S1S2 +systolic murmur  Abd: Soft, NT, ND +BS  Ext: No edema, + peripheral pulses    Cardiac Surgery Risk Factors  CVA and/or carotid/cerebrovascular disease. Yes  No  Explain if Yes  Aortoiliac disease Yes  No  Explain if Yes  Previous MI Yes  No  Explain if Yes  Previos Cardiac Surgery Yes  No  Explain if Yes ruled in for nstemi on 9/21  Hemodynamics-Unstable or Shock Yes  No  Explain if Yes  Diabetis Yes  No  Explain if Yes- on insulin gtt now and on oral agents at home  Hepatic Failure Yes  No  Explain if Yes  Renal failure and/or dialysis Yes  No  Explain if Yes  Heart failure-type-present or past Yes  No  Explain if Yes  COPD Yes  No  Explain if Yes -- fev1 37% predicted   Immune System Deficiency Yes  No  Explain if Yes  Malignant Ventricular Arrhythmia Yes  No  Explain if Yes  Family hx pos for CAD; dad had MI at age of 48  CHF- acute EF of 25%    STS Score: Risk of Mortality:  8.239%  Renal Failure:  5.807%  Permanent Stroke:  2.609%  Prolonged Ventilation:  26.966%  DSW Infection:  0.267%  Reoperation:  3.769%  Morbidity or Mortality:  35.542%  Short Length of Stay:  16.305%  Long Length of Stay:  15.466%      Pt has AICD/PPM [ ] Yes  [x ] No             Brand Name:  Pre-op Beta Blocker ordered within 24 hrs of surgery (CABG ONLY)?  [x ] Yes  [ ] No  If not, Why?  Type & Cross  [x ] Yes  [ ] No  NPO after Midnight [x ] Yes  [ ] No  Pre-op ABX ordered, to be taped on chart:  [ x] Yes  [ ] No     Hibiclens/Peridex ordered [x ] Yes  [ ] No  Intraop on Hold: PRBCs, CXR, JAMILA [x ]   Consent obtained  [x ] Yes  [ ] No    A/P- Patient is a 67 yo female with left main multi-vessel CAD pre-op for CABG to be scheduled in the am  cont present tx as per CT surgeon/card  NPO after midnight  consent obtained

## 2019-10-07 ENCOUNTER — TRANSCRIPTION ENCOUNTER (OUTPATIENT)
Age: 69
End: 2019-10-07

## 2019-10-07 LAB
ALBUMIN SERPL ELPH-MCNC: 3.3 G/DL — LOW (ref 3.5–5.2)
ALP SERPL-CCNC: 69 U/L — SIGNIFICANT CHANGE UP (ref 30–115)
ALT FLD-CCNC: 18 U/L — SIGNIFICANT CHANGE UP (ref 0–41)
ANION GAP SERPL CALC-SCNC: 13 MMOL/L — SIGNIFICANT CHANGE UP (ref 7–14)
ANION GAP SERPL CALC-SCNC: 9 MMOL/L — SIGNIFICANT CHANGE UP (ref 7–14)
ANISOCYTOSIS BLD QL: SIGNIFICANT CHANGE UP
APTT BLD: 24.8 SEC — LOW (ref 27–39.2)
APTT BLD: 29.5 SEC — SIGNIFICANT CHANGE UP (ref 27–39.2)
APTT BLD: 39 SEC — SIGNIFICANT CHANGE UP (ref 27–39.2)
AST SERPL-CCNC: 40 U/L — SIGNIFICANT CHANGE UP (ref 0–41)
BASOPHILS # BLD AUTO: 0 K/UL — SIGNIFICANT CHANGE UP (ref 0–0.2)
BASOPHILS # BLD AUTO: 0.03 K/UL — SIGNIFICANT CHANGE UP (ref 0–0.2)
BASOPHILS # BLD AUTO: 0.03 K/UL — SIGNIFICANT CHANGE UP (ref 0–0.2)
BASOPHILS NFR BLD AUTO: 0 % — SIGNIFICANT CHANGE UP (ref 0–1)
BASOPHILS NFR BLD AUTO: 0.2 % — SIGNIFICANT CHANGE UP (ref 0–1)
BASOPHILS NFR BLD AUTO: 0.2 % — SIGNIFICANT CHANGE UP (ref 0–1)
BILIRUB SERPL-MCNC: 0.4 MG/DL — SIGNIFICANT CHANGE UP (ref 0.2–1.2)
BLD GP AB SCN SERPL QL: SIGNIFICANT CHANGE UP
BUN SERPL-MCNC: 43 MG/DL — HIGH (ref 10–20)
BUN SERPL-MCNC: 57 MG/DL — HIGH (ref 10–20)
BURR CELLS BLD QL SMEAR: PRESENT — SIGNIFICANT CHANGE UP
CALCIUM SERPL-MCNC: 8.2 MG/DL — LOW (ref 8.5–10.1)
CALCIUM SERPL-MCNC: 8.9 MG/DL — SIGNIFICANT CHANGE UP (ref 8.5–10.1)
CHLORIDE SERPL-SCNC: 107 MMOL/L — SIGNIFICANT CHANGE UP (ref 98–110)
CHLORIDE SERPL-SCNC: 111 MMOL/L — HIGH (ref 98–110)
CO2 SERPL-SCNC: 21 MMOL/L — SIGNIFICANT CHANGE UP (ref 17–32)
CO2 SERPL-SCNC: 25 MMOL/L — SIGNIFICANT CHANGE UP (ref 17–32)
CREAT SERPL-MCNC: 0.9 MG/DL — SIGNIFICANT CHANGE UP (ref 0.7–1.5)
CREAT SERPL-MCNC: 1.2 MG/DL — SIGNIFICANT CHANGE UP (ref 0.7–1.5)
EOSINOPHIL # BLD AUTO: 0.02 K/UL — SIGNIFICANT CHANGE UP (ref 0–0.7)
EOSINOPHIL # BLD AUTO: 0.04 K/UL — SIGNIFICANT CHANGE UP (ref 0–0.7)
EOSINOPHIL # BLD AUTO: 0.11 K/UL — SIGNIFICANT CHANGE UP (ref 0–0.7)
EOSINOPHIL NFR BLD AUTO: 0.1 % — SIGNIFICANT CHANGE UP (ref 0–8)
EOSINOPHIL NFR BLD AUTO: 0.2 % — SIGNIFICANT CHANGE UP (ref 0–8)
EOSINOPHIL NFR BLD AUTO: 0.9 % — SIGNIFICANT CHANGE UP (ref 0–8)
FIBRINOGEN PPP-MCNC: 286 MG/DL — SIGNIFICANT CHANGE UP (ref 204.4–570.6)
GAS PNL BLDA: SIGNIFICANT CHANGE UP
GIANT PLATELETS BLD QL SMEAR: PRESENT — SIGNIFICANT CHANGE UP
GLUCOSE BLDC GLUCOMTR-MCNC: 114 MG/DL — HIGH (ref 70–99)
GLUCOSE BLDC GLUCOMTR-MCNC: 118 MG/DL — HIGH (ref 70–99)
GLUCOSE BLDC GLUCOMTR-MCNC: 126 MG/DL — HIGH (ref 70–99)
GLUCOSE BLDC GLUCOMTR-MCNC: 85 MG/DL — SIGNIFICANT CHANGE UP (ref 70–99)
GLUCOSE SERPL-MCNC: 112 MG/DL — HIGH (ref 70–99)
GLUCOSE SERPL-MCNC: 117 MG/DL — HIGH (ref 70–99)
HCT VFR BLD CALC: 23.2 % — LOW (ref 37–47)
HCT VFR BLD CALC: 23.6 % — LOW (ref 37–47)
HCT VFR BLD CALC: 24 % — LOW (ref 37–47)
HCT VFR BLD CALC: 26.8 % — LOW (ref 37–47)
HGB BLD-MCNC: 7.3 G/DL — LOW (ref 12–16)
HGB BLD-MCNC: 7.5 G/DL — LOW (ref 12–16)
HGB BLD-MCNC: 7.7 G/DL — LOW (ref 12–16)
HGB BLD-MCNC: 8.3 G/DL — LOW (ref 12–16)
IMM GRANULOCYTES NFR BLD AUTO: 2.5 % — HIGH (ref 0.1–0.3)
IMM GRANULOCYTES NFR BLD AUTO: 2.6 % — HIGH (ref 0.1–0.3)
INR BLD: 1.21 RATIO — SIGNIFICANT CHANGE UP (ref 0.65–1.3)
INR BLD: 1.22 RATIO — SIGNIFICANT CHANGE UP (ref 0.65–1.3)
LYMPHOCYTES # BLD AUTO: 0.47 K/UL — LOW (ref 1.2–3.4)
LYMPHOCYTES # BLD AUTO: 0.58 K/UL — LOW (ref 1.2–3.4)
LYMPHOCYTES # BLD AUTO: 0.62 K/UL — LOW (ref 1.2–3.4)
LYMPHOCYTES # BLD AUTO: 2.5 % — LOW (ref 20.5–51.1)
LYMPHOCYTES # BLD AUTO: 3.6 % — LOW (ref 20.5–51.1)
LYMPHOCYTES # BLD AUTO: 5.2 % — LOW (ref 20.5–51.1)
MAGNESIUM SERPL-MCNC: 3.6 MG/DL — CRITICAL HIGH (ref 1.8–2.4)
MANUAL SMEAR VERIFICATION: SIGNIFICANT CHANGE UP
MCHC RBC-ENTMCNC: 26.5 PG — LOW (ref 27–31)
MCHC RBC-ENTMCNC: 27 PG — SIGNIFICANT CHANGE UP (ref 27–31)
MCHC RBC-ENTMCNC: 27.1 PG — SIGNIFICANT CHANGE UP (ref 27–31)
MCHC RBC-ENTMCNC: 27.6 PG — SIGNIFICANT CHANGE UP (ref 27–31)
MCHC RBC-ENTMCNC: 31 G/DL — LOW (ref 32–37)
MCHC RBC-ENTMCNC: 31.5 G/DL — LOW (ref 32–37)
MCHC RBC-ENTMCNC: 31.8 G/DL — LOW (ref 32–37)
MCHC RBC-ENTMCNC: 32.1 G/DL — SIGNIFICANT CHANGE UP (ref 32–37)
MCV RBC AUTO: 84.9 FL — SIGNIFICANT CHANGE UP (ref 81–99)
MCV RBC AUTO: 85.6 FL — SIGNIFICANT CHANGE UP (ref 81–99)
MCV RBC AUTO: 86 FL — SIGNIFICANT CHANGE UP (ref 81–99)
MCV RBC AUTO: 86.2 FL — SIGNIFICANT CHANGE UP (ref 81–99)
METAMYELOCYTES # FLD: 1.7 % — HIGH (ref 0–0)
MICROCYTES BLD QL: SIGNIFICANT CHANGE UP
MONOCYTES # BLD AUTO: 0 K/UL — LOW (ref 0.1–0.6)
MONOCYTES # BLD AUTO: 0.28 K/UL — SIGNIFICANT CHANGE UP (ref 0.1–0.6)
MONOCYTES # BLD AUTO: 1.07 K/UL — HIGH (ref 0.1–0.6)
MONOCYTES NFR BLD AUTO: 0 % — LOW (ref 1.7–9.3)
MONOCYTES NFR BLD AUTO: 1.5 % — LOW (ref 1.7–9.3)
MONOCYTES NFR BLD AUTO: 6.7 % — SIGNIFICANT CHANGE UP (ref 1.7–9.3)
MYELOCYTES NFR BLD: 1.7 % — HIGH (ref 0–0)
NEUTROPHILS # BLD AUTO: 10.87 K/UL — HIGH (ref 1.4–6.5)
NEUTROPHILS # BLD AUTO: 13.85 K/UL — HIGH (ref 1.4–6.5)
NEUTROPHILS # BLD AUTO: 17.64 K/UL — HIGH (ref 1.4–6.5)
NEUTROPHILS NFR BLD AUTO: 86.8 % — HIGH (ref 42.2–75.2)
NEUTROPHILS NFR BLD AUTO: 88.7 % — HIGH (ref 42.2–75.2)
NEUTROPHILS NFR BLD AUTO: 93.1 % — HIGH (ref 42.2–75.2)
NEUTS BAND # BLD: 1.8 % — SIGNIFICANT CHANGE UP (ref 0–6)
NRBC # BLD: 0 /100 WBCS — SIGNIFICANT CHANGE UP (ref 0–0)
OVALOCYTES BLD QL SMEAR: SLIGHT — SIGNIFICANT CHANGE UP
PLAT MORPH BLD: NORMAL — SIGNIFICANT CHANGE UP
PLATELET # BLD AUTO: 121 K/UL — LOW (ref 130–400)
PLATELET # BLD AUTO: 193 K/UL — SIGNIFICANT CHANGE UP (ref 130–400)
PLATELET # BLD AUTO: 243 K/UL — SIGNIFICANT CHANGE UP (ref 130–400)
PLATELET # BLD AUTO: 269 K/UL — SIGNIFICANT CHANGE UP (ref 130–400)
POIKILOCYTOSIS BLD QL AUTO: SLIGHT — SIGNIFICANT CHANGE UP
POLYCHROMASIA BLD QL SMEAR: SIGNIFICANT CHANGE UP
POTASSIUM SERPL-MCNC: 4.7 MMOL/L — SIGNIFICANT CHANGE UP (ref 3.5–5)
POTASSIUM SERPL-MCNC: 5.1 MMOL/L — HIGH (ref 3.5–5)
POTASSIUM SERPL-SCNC: 4.7 MMOL/L — SIGNIFICANT CHANGE UP (ref 3.5–5)
POTASSIUM SERPL-SCNC: 5.1 MMOL/L — HIGH (ref 3.5–5)
PROT SERPL-MCNC: 4.7 G/DL — LOW (ref 6–8)
PROTHROM AB SERPL-ACNC: 13.9 SEC — HIGH (ref 9.95–12.87)
PROTHROM AB SERPL-ACNC: 14 SEC — HIGH (ref 9.95–12.87)
RBC # BLD: 2.69 M/UL — LOW (ref 4.2–5.4)
RBC # BLD: 2.78 M/UL — LOW (ref 4.2–5.4)
RBC # BLD: 2.79 M/UL — LOW (ref 4.2–5.4)
RBC # BLD: 3.13 M/UL — LOW (ref 4.2–5.4)
RBC # FLD: 14.7 % — HIGH (ref 11.5–14.5)
RBC # FLD: 14.7 % — HIGH (ref 11.5–14.5)
RBC # FLD: 15.3 % — HIGH (ref 11.5–14.5)
RBC # FLD: 15.3 % — HIGH (ref 11.5–14.5)
RBC BLD AUTO: SIGNIFICANT CHANGE UP
SODIUM SERPL-SCNC: 141 MMOL/L — SIGNIFICANT CHANGE UP (ref 135–146)
SODIUM SERPL-SCNC: 145 MMOL/L — SIGNIFICANT CHANGE UP (ref 135–146)
WBC # BLD: 12.01 K/UL — HIGH (ref 4.8–10.8)
WBC # BLD: 15.96 K/UL — HIGH (ref 4.8–10.8)
WBC # BLD: 18.94 K/UL — HIGH (ref 4.8–10.8)
WBC # BLD: 9.05 K/UL — SIGNIFICANT CHANGE UP (ref 4.8–10.8)
WBC # FLD AUTO: 12.01 K/UL — HIGH (ref 4.8–10.8)
WBC # FLD AUTO: 15.96 K/UL — HIGH (ref 4.8–10.8)
WBC # FLD AUTO: 18.94 K/UL — HIGH (ref 4.8–10.8)
WBC # FLD AUTO: 9.05 K/UL — SIGNIFICANT CHANGE UP (ref 4.8–10.8)

## 2019-10-07 PROCEDURE — 33508 ENDOSCOPIC VEIN HARVEST: CPT | Mod: 82

## 2019-10-07 PROCEDURE — 71045 X-RAY EXAM CHEST 1 VIEW: CPT | Mod: 26

## 2019-10-07 PROCEDURE — 36620 INSERTION CATHETER ARTERY: CPT | Mod: 59

## 2019-10-07 PROCEDURE — 33518 CABG ARTERY-VEIN TWO: CPT

## 2019-10-07 PROCEDURE — 33533 CABG ARTERIAL SINGLE: CPT | Mod: 82

## 2019-10-07 PROCEDURE — 33518 CABG ARTERY-VEIN TWO: CPT | Mod: 82

## 2019-10-07 PROCEDURE — 33999 UNLISTED PX CARDIAC SURGERY: CPT | Mod: 82

## 2019-10-07 PROCEDURE — 93010 ELECTROCARDIOGRAM REPORT: CPT

## 2019-10-07 PROCEDURE — 33999 UNLISTED PX CARDIAC SURGERY: CPT

## 2019-10-07 PROCEDURE — 33533 CABG ARTERIAL SINGLE: CPT

## 2019-10-07 PROCEDURE — 33508 ENDOSCOPIC VEIN HARVEST: CPT

## 2019-10-07 RX ORDER — CHLORHEXIDINE GLUCONATE 213 G/1000ML
1 SOLUTION TOPICAL
Refills: 0 | Status: DISCONTINUED | OUTPATIENT
Start: 2019-10-07 | End: 2019-10-18

## 2019-10-07 RX ORDER — NICARDIPINE HYDROCHLORIDE 30 MG/1
5 CAPSULE, EXTENDED RELEASE ORAL
Qty: 40 | Refills: 0 | Status: DISCONTINUED | OUTPATIENT
Start: 2019-10-07 | End: 2019-10-08

## 2019-10-07 RX ORDER — CHLORHEXIDINE GLUCONATE 213 G/1000ML
15 SOLUTION TOPICAL EVERY 12 HOURS
Refills: 0 | Status: DISCONTINUED | OUTPATIENT
Start: 2019-10-07 | End: 2019-10-09

## 2019-10-07 RX ORDER — PROPOFOL 10 MG/ML
10 INJECTION, EMULSION INTRAVENOUS
Qty: 1000 | Refills: 0 | Status: DISCONTINUED | OUTPATIENT
Start: 2019-10-07 | End: 2019-10-08

## 2019-10-07 RX ORDER — NOREPINEPHRINE BITARTRATE/D5W 8 MG/250ML
0.05 PLASTIC BAG, INJECTION (ML) INTRAVENOUS
Qty: 8 | Refills: 0 | Status: DISCONTINUED | OUTPATIENT
Start: 2019-10-07 | End: 2019-10-08

## 2019-10-07 RX ORDER — INSULIN HUMAN 100 [IU]/ML
1 INJECTION, SOLUTION SUBCUTANEOUS
Qty: 100 | Refills: 0 | Status: DISCONTINUED | OUTPATIENT
Start: 2019-10-07 | End: 2019-10-11

## 2019-10-07 RX ORDER — AMIODARONE HYDROCHLORIDE 400 MG/1
150 TABLET ORAL ONCE
Refills: 0 | Status: COMPLETED | OUTPATIENT
Start: 2019-10-07 | End: 2019-10-07

## 2019-10-07 RX ORDER — DEXMEDETOMIDINE HYDROCHLORIDE IN 0.9% SODIUM CHLORIDE 4 UG/ML
0.25 INJECTION INTRAVENOUS
Qty: 200 | Refills: 0 | Status: DISCONTINUED | OUTPATIENT
Start: 2019-10-07 | End: 2019-10-08

## 2019-10-07 RX ORDER — ALBUTEROL 90 UG/1
2 AEROSOL, METERED ORAL EVERY 6 HOURS
Refills: 0 | Status: DISCONTINUED | OUTPATIENT
Start: 2019-10-07 | End: 2019-10-08

## 2019-10-07 RX ORDER — ALBUMIN HUMAN 25 %
500 VIAL (ML) INTRAVENOUS ONCE
Refills: 0 | Status: COMPLETED | OUTPATIENT
Start: 2019-10-07 | End: 2019-10-07

## 2019-10-07 RX ORDER — FENTANYL CITRATE 50 UG/ML
25 INJECTION INTRAVENOUS
Refills: 0 | Status: DISCONTINUED | OUTPATIENT
Start: 2019-10-07 | End: 2019-10-08

## 2019-10-07 RX ORDER — MILRINONE LACTATE 1 MG/ML
0.5 INJECTION, SOLUTION INTRAVENOUS
Qty: 20 | Refills: 0 | Status: DISCONTINUED | OUTPATIENT
Start: 2019-10-07 | End: 2019-10-10

## 2019-10-07 RX ORDER — NITROGLYCERIN 6.5 MG
5 CAPSULE, EXTENDED RELEASE ORAL
Qty: 50 | Refills: 0 | Status: DISCONTINUED | OUTPATIENT
Start: 2019-10-07 | End: 2019-10-08

## 2019-10-07 RX ORDER — POLYETHYLENE GLYCOL 3350 17 G/17G
17 POWDER, FOR SOLUTION ORAL DAILY
Refills: 0 | Status: DISCONTINUED | OUTPATIENT
Start: 2019-10-07 | End: 2019-10-08

## 2019-10-07 RX ORDER — AMIODARONE HYDROCHLORIDE 400 MG/1
0.5 TABLET ORAL
Qty: 900 | Refills: 0 | Status: DISCONTINUED | OUTPATIENT
Start: 2019-10-07 | End: 2019-10-08

## 2019-10-07 RX ORDER — MAGNESIUM SULFATE 500 MG/ML
1 VIAL (ML) INJECTION DAILY
Refills: 0 | Status: DISCONTINUED | OUTPATIENT
Start: 2019-10-07 | End: 2019-10-18

## 2019-10-07 RX ORDER — DEXTROSE 50 % IN WATER 50 %
50 SYRINGE (ML) INTRAVENOUS
Refills: 0 | Status: DISCONTINUED | OUTPATIENT
Start: 2019-10-07 | End: 2019-10-18

## 2019-10-07 RX ORDER — ALBUMIN HUMAN 25 %
125 VIAL (ML) INTRAVENOUS ONCE
Refills: 0 | Status: DISCONTINUED | OUTPATIENT
Start: 2019-10-07 | End: 2019-10-09

## 2019-10-07 RX ORDER — CHLORHEXIDINE GLUCONATE 213 G/1000ML
5 SOLUTION TOPICAL EVERY 4 HOURS
Refills: 0 | Status: DISCONTINUED | OUTPATIENT
Start: 2019-10-07 | End: 2019-10-09

## 2019-10-07 RX ORDER — OXYCODONE HYDROCHLORIDE 5 MG/1
5 TABLET ORAL EVERY 4 HOURS
Refills: 0 | Status: DISCONTINUED | OUTPATIENT
Start: 2019-10-07 | End: 2019-10-08

## 2019-10-07 RX ORDER — AMIODARONE HYDROCHLORIDE 400 MG/1
1 TABLET ORAL
Qty: 900 | Refills: 0 | Status: DISCONTINUED | OUTPATIENT
Start: 2019-10-07 | End: 2019-10-07

## 2019-10-07 RX ORDER — CEFAZOLIN SODIUM 1 G
1000 VIAL (EA) INJECTION EVERY 8 HOURS
Refills: 0 | Status: COMPLETED | OUTPATIENT
Start: 2019-10-07 | End: 2019-10-08

## 2019-10-07 RX ORDER — MEPERIDINE HYDROCHLORIDE 50 MG/ML
25 INJECTION INTRAMUSCULAR; INTRAVENOUS; SUBCUTANEOUS ONCE
Refills: 0 | Status: DISCONTINUED | OUTPATIENT
Start: 2019-10-07 | End: 2019-10-08

## 2019-10-07 RX ORDER — ASPIRIN/CALCIUM CARB/MAGNESIUM 324 MG
325 TABLET ORAL DAILY
Refills: 0 | Status: DISCONTINUED | OUTPATIENT
Start: 2019-10-07 | End: 2019-10-08

## 2019-10-07 RX ORDER — DOCUSATE SODIUM 100 MG
100 CAPSULE ORAL THREE TIMES A DAY
Refills: 0 | Status: DISCONTINUED | OUTPATIENT
Start: 2019-10-07 | End: 2019-10-08

## 2019-10-07 RX ORDER — PANTOPRAZOLE SODIUM 20 MG/1
40 TABLET, DELAYED RELEASE ORAL DAILY
Refills: 0 | Status: DISCONTINUED | OUTPATIENT
Start: 2019-10-07 | End: 2019-10-08

## 2019-10-07 RX ORDER — VASOPRESSIN 20 [USP'U]/ML
0.04 INJECTION INTRAVENOUS
Qty: 50 | Refills: 0 | Status: DISCONTINUED | OUTPATIENT
Start: 2019-10-07 | End: 2019-10-09

## 2019-10-07 RX ORDER — ONDANSETRON 8 MG/1
4 TABLET, FILM COATED ORAL ONCE
Refills: 0 | Status: DISCONTINUED | OUTPATIENT
Start: 2019-10-07 | End: 2019-10-12

## 2019-10-07 RX ORDER — SODIUM CHLORIDE 9 MG/ML
1000 INJECTION INTRAMUSCULAR; INTRAVENOUS; SUBCUTANEOUS
Refills: 0 | Status: DISCONTINUED | OUTPATIENT
Start: 2019-10-07 | End: 2019-10-18

## 2019-10-07 RX ORDER — DEXTROSE 50 % IN WATER 50 %
25 SYRINGE (ML) INTRAVENOUS
Refills: 0 | Status: DISCONTINUED | OUTPATIENT
Start: 2019-10-07 | End: 2019-10-18

## 2019-10-07 RX ORDER — IPRATROPIUM BROMIDE 0.2 MG/ML
2 SOLUTION, NON-ORAL INHALATION EVERY 6 HOURS
Refills: 0 | Status: DISCONTINUED | OUTPATIENT
Start: 2019-10-07 | End: 2019-10-08

## 2019-10-07 RX ADMIN — Medication 250 MILLILITER(S): at 16:30

## 2019-10-07 RX ADMIN — CHLORHEXIDINE GLUCONATE 5 MILLILITER(S): 213 SOLUTION TOPICAL at 21:25

## 2019-10-07 RX ADMIN — FENTANYL CITRATE 25 MICROGRAM(S): 50 INJECTION INTRAVENOUS at 18:55

## 2019-10-07 RX ADMIN — CHLORHEXIDINE GLUCONATE 1 APPLICATION(S): 213 SOLUTION TOPICAL at 05:53

## 2019-10-07 RX ADMIN — CHLORHEXIDINE GLUCONATE 15 MILLILITER(S): 213 SOLUTION TOPICAL at 18:41

## 2019-10-07 RX ADMIN — CHLORHEXIDINE GLUCONATE 5 MILLILITER(S): 213 SOLUTION TOPICAL at 05:53

## 2019-10-07 RX ADMIN — POLYETHYLENE GLYCOL 3350 17 GRAM(S): 17 POWDER, FOR SOLUTION ORAL at 18:42

## 2019-10-07 RX ADMIN — Medication 60 MILLIGRAM(S): at 23:41

## 2019-10-07 RX ADMIN — AMIODARONE HYDROCHLORIDE 33.33 MG/MIN: 400 TABLET ORAL at 17:00

## 2019-10-07 RX ADMIN — PANTOPRAZOLE SODIUM 40 MILLIGRAM(S): 20 TABLET, DELAYED RELEASE ORAL at 18:57

## 2019-10-07 RX ADMIN — INSULIN HUMAN 1 UNIT(S)/HR: 100 INJECTION, SOLUTION SUBCUTANEOUS at 19:00

## 2019-10-07 RX ADMIN — MILRINONE LACTATE 8.58 MICROGRAM(S)/KG/MIN: 1 INJECTION, SOLUTION INTRAVENOUS at 14:40

## 2019-10-07 RX ADMIN — CHLORHEXIDINE GLUCONATE 5 MILLILITER(S): 213 SOLUTION TOPICAL at 19:39

## 2019-10-07 RX ADMIN — FENTANYL CITRATE 25 MICROGRAM(S): 50 INJECTION INTRAVENOUS at 16:45

## 2019-10-07 RX ADMIN — Medication 100 MILLIGRAM(S): at 18:42

## 2019-10-07 RX ADMIN — AMIODARONE HYDROCHLORIDE 618 MILLIGRAM(S): 400 TABLET ORAL at 18:49

## 2019-10-07 RX ADMIN — FENTANYL CITRATE 25 MICROGRAM(S): 50 INJECTION INTRAVENOUS at 16:31

## 2019-10-07 RX ADMIN — FENTANYL CITRATE 25 MICROGRAM(S): 50 INJECTION INTRAVENOUS at 18:40

## 2019-10-07 NOTE — DISCHARGE NOTE PROVIDER - HOSPITAL COURSE
68F with PMHx of uncontrolled DM (non-compliant), viral myopathy, Paroxysmal Afib, recent labial abscess s/p ID, hip fracture s/p fixation, recent LGIB bleed presented for shortness of breath. Patient has been having shortness of breath. Patient haad an indwelling montejo for 3 weeks 2/2 urinary retention. Admitted for NSTEMI, acute systolic HF, and RIC.  Dyspnea advanced to acute respiratory failure requiring mechanical ventilatory support. She developed atrial fibrillation, was noted to have moderate to severe MR on TTE and eventual cath noted multi-vessel CAD. She was medically optimized over the next 2 weeks prior to undergoing myocardial revascularization via CABG. her post-op Course 68F with PMHx of uncontrolled DM (non-compliant), viral myopathy, Paroxysmal Afib, recent labial abscess s/p ID, hip fracture s/p fixation, recent LGIB bleed presented for shortness of breath. Patient has been having shortness of breath. Patient haad an indwelling montejo for 3 weeks 2/2 urinary retention. Admitted for NSTEMI, acute systolic HF, and RIC.  Dyspnea advanced to acute respiratory failure requiring mechanical ventilatory support. She developed atrial fibrillation, was noted to have moderate to severe MR on TTE and eventual cath noted multi-vessel CAD. She was medically optimized over the next 2 weeks prior to undergoing myocardial revascularization via CABG. her post-op course was complicated by acute on chronic systolic dysfunction heart failure and she was discharged home with lifevest and will follow up with heart failure specialist.  She also was discharged with a leg bag and will follow up with the Uro-Gynecologist for voiding trial due to her hx of uterine prolapse and urinary retention.

## 2019-10-07 NOTE — DISCHARGE NOTE PROVIDER - NSDCFUADDINST_GEN_ALL_CORE_FT
Activities/Restrictions  1.	Do not – drive, lift, pull or push anything over 10 pounds for 8 weeks.  2.	Shower every night and carefully wash wound, pat dry.  Cover is wound is draining with dry sterile dressing. No baths or swimming for two months.   3.	Apply support stockings /ace wraps to legs as soon as you get out of bed in the morning, remove in evening.   4.	Do progressive walking exercises every day, gradually increasing to 30 to 40 minutes/day, five days a week and incentive spirometer 10 times every hour while awake  5.	DO NOT DRIVE OR CONSUME ALCOHOL WHILE TAKING PAIN MEDICATION.  Contact your Physician promptly if:  1.	 Signs of wound infection, such as increasing redness, swelling, pain or drainage from incision.  2.	Progressive shortness of breath or increasing difficulty with breathing when lying down.  3.	Excessive nausea, vomiting, diarrhea or coughing.  4.	Increase swelling of legs that does not resolve with leg elevation.  5.	Chest pain that spreads to arms, jaw or back or sudden development of numbness, weakness, difficulty speaking or facial droop – Call 911.  6.	Diabetics who are unable to keep finger stick glucose under 150 for three consecutive readings.  Instructions:  1.	 Keep a daily log for Temperature, pulse, blood pressure, and weight twice a day and Glucose if diabetic with each meal. Call office for Temp > 101, pulse greater than 110 or less than 55, BP first # greater than 160 or less than 100, 3 pound weight gain in 1 day or 5 pounds in 3 days  2.	Hold pillow to chest and grab elbows when you need to cough. Wear LifeVest at all times except when showering    Activities/Restrictions  1.	Do not – drive, lift, pull or push anything over 10 pounds for 8 weeks.  2.	Shower every night and carefully wash wound, pat dry.  Cover is wound is draining with dry sterile dressing. No baths or swimming for two months.   3.	Apply support stockings /ace wraps to legs as soon as you get out of bed in the morning, remove in evening.   4.	Do progressive walking exercises every day, gradually increasing to 30 to 40 minutes/day, five days a week and incentive spirometer 10 times every hour while awake  5.	DO NOT DRIVE OR CONSUME ALCOHOL WHILE TAKING PAIN MEDICATION.  Contact your Physician promptly if:  1.	 Signs of wound infection, such as increasing redness, swelling, pain or drainage from incision.  2.	Progressive shortness of breath or increasing difficulty with breathing when lying down.  3.	Excessive nausea, vomiting, diarrhea or coughing.  4.	Increase swelling of legs that does not resolve with leg elevation.  5.	Chest pain that spreads to arms, jaw or back or sudden development of numbness, weakness, difficulty speaking or facial droop – Call 911.  6.	Diabetics who are unable to keep finger stick glucose under 150 for three consecutive readings.  Instructions:  1.	 Keep a daily log for Temperature, pulse, blood pressure, and weight twice a day and Glucose if diabetic with each meal. Call office for Temp > 101, pulse greater than 110 or less than 55, BP first # greater than 160 or less than 100, 3 pound weight gain in 1 day or 5 pounds in 3 days  2.	Hold pillow to chest and grab elbows when you need to cough.

## 2019-10-07 NOTE — DISCHARGE NOTE PROVIDER - PROVIDER TOKENS
PROVIDER:[TOKEN:[[id number]],FOLLOWUP:[1 month]],PROVIDER:[TOKEN:[[id number]],FOLLOWUP:[1 month]],PROVIDER:[TOKEN:[[id number]],FOLLOWUP:[1 week]] PROVIDER:[TOKEN:[[id number]],FOLLOWUP:[1 month]],PROVIDER:[TOKEN:[[id number]],FOLLOWUP:[1 month]],PROVIDER:[TOKEN:[[id number]],FOLLOWUP:[1 week]],PROVIDER:[TOKEN:[[id number]]],PROVIDER:[TOKEN:[[id number]]],PROVIDER:[TOKEN:[[id number]]]

## 2019-10-07 NOTE — DISCHARGE NOTE PROVIDER - NSDCCPCAREPLAN_GEN_ALL_CORE_FT
PRINCIPAL DISCHARGE DIAGNOSIS  Diagnosis: Chest pain  Assessment and Plan of Treatment:       SECONDARY DISCHARGE DIAGNOSES  Diagnosis: Elevated troponin  Assessment and Plan of Treatment:     Diagnosis: SOB (shortness of breath)  Assessment and Plan of Treatment:

## 2019-10-07 NOTE — BRIEF OPERATIVE NOTE - NSICDXBRIEFPOSTOP_GEN_ALL_CORE_FT
POST-OP DIAGNOSIS:  Coronary artery disease involving native heart with unstable angina pectoris 07-Oct-2019 11:16:12  Azar Crespo

## 2019-10-07 NOTE — PROGRESS NOTE ADULT - SUBJECTIVE AND OBJECTIVE BOX
SUBJ: No new complains      MEDICATIONS  (STANDING):    MEDICATIONS  (PRN):            Vital Signs Last 24 Hrs  T(C): 36.1 (07 Oct 2019 06:09), Max: 36.6 (06 Oct 2019 12:00)  T(F): 97 (07 Oct 2019 04:00), Max: 97.9 (06 Oct 2019 12:00)  HR: 63 (07 Oct 2019 06:09) (54 - 76)  BP: 104/54 (07 Oct 2019 06:09) (99/52 - 118/59)  BP(mean): 75 (07 Oct 2019 06:09) (72 - 84)  RR: 20 (07 Oct 2019 06:09) (13 - 39)  SpO2: 96% (07 Oct 2019 06:09) (96% - 100%)     REVIEW OF SYSTEMS:  CONSTITUTIONAL: No fever, weight loss, or fatigue  CARDIOLOGY: PAtient denies chest pain, shortness of breath or syncopal episodes.   RESPIRATORY: denies shortness of breath, wheezeing.   NEUROLOGICAL: NO weakness, no focal deficits to report.  ENDOCRINOLOGICAL: no recent change in diabetic medications.   GI: no BRBPR, no N,V,diarrhea.    PSYCHIATRY: normal mood and affect  HEENT: no nasal discharge, no ecchymosis  SKIN: no ecchymosis, no breakdown  MUSCULOSKELETAL: Full range of motion x4.        PHYSICAL EXAM:  · CONSTITUTIONAL:	Well-developed, well nourished    BMI-  ·RESPIRATORY:   airway patent; breath sounds equal; good air movement; respirations non-labored; clear to auscultation bilaterally; no chest wall tenderness; no intercostal retractions; no rales,rhonchi or wheeze  · CARDIOVASCULAR	regular rate and rhythm  no rub  no murmur  normal PMI  · EXTREMITIES: No cyanosis, clubbing or edema  · VASCULAR: 	Equal and normal pulses (carotid, femoral, dorsalis pedis)  	  TELEMETRY:    ECG:    TTE:    LABS:                        8.3    9.05  )-----------( 269      ( 07 Oct 2019 02:00 )             26.8     10-07    141  |  107  |  57<H>  ----------------------------<  117<H>  5.1<H>   |  25  |  1.2    Ca    8.9      07 Oct 2019 02:00          PTT - ( 07 Oct 2019 01:42 )  PTT:39.0 sec    I&O's Summary    06 Oct 2019 07:01  -  07 Oct 2019 07:00  --------------------------------------------------------  IN: 212 mL / OUT: 1037 mL / NET: -825 mL      BNP  RADIOLOGY & ADDITIONAL STUDIES:    IMPRESSION AND PLAN:    patient for CABG

## 2019-10-07 NOTE — DISCHARGE NOTE PROVIDER - NSDCCPTREATMENT_GEN_ALL_CORE_FT
PRINCIPAL PROCEDURE  Procedure: CABG, using 1 arterial and 2 venous grafts  Findings and Treatment: Utilizing Left NATHANIEL to LAD and reverse saphenous vein grafts to the PDA and OM  35mm Clip applied to left atrial appendge

## 2019-10-07 NOTE — CHART NOTE - NSCHARTNOTEFT_GEN_A_CORE
Registered Dietitian Follow-Up     Patient Profile Reviewed                           Yes [x]   No []     Nutrition History Previously Obtained        Yes [x]  No []       Pertinent Subjective Information: Pt not available for interview. Documented po intake % before NPO status.      Pertinent Medical Interventions: Pt in OR for CABG at time of visit.      Diet order: NPO     Anthropometrics:  - Ht. 160cm  - Wt. 57.3 kg 10/3 vs. 58.8kg on 9/29 vs. 58.7kg on 9/26- relatively stable   - %wt change  - BMI 23.0  - IBW     Pertinent Lab Data: (10/7) H/H 7.5/23.6  K 5.1  BUN 57  glucose 117      Pertinent Meds: Insulin regular, NS, Metoprolol, Zofran, Protonix, Atorvastatin, Aldactone, miralax     Physical Findings:  - Appearance: unable to assess  - GI function: LBM 10/ 6  - Tubes:  - Oral/Mouth cavity: Tolerating soft cut up   - Skin: BS 19 ecchymosis. L upper sacrum/lower back abrasion      Nutrition Requirements (from RD note on 9/23)   Weight Used: 58.7kg- lowest doc weight      Estimated Energy Needs    Continue [x]  Adjust [] 1315-1425kcal (MSJ x 1.2-1.3 AF)  Adjusted Energy Recommendations:   kcal/day        Estimated Protein Needs    Continue [x]  Adjust [] 59-71g (1-1.2g/kg CBW) lean towards upper end post op  Adjusted Protein Recommendations:   gm/day        Estimated Fluid Needs        Continue [x]  Adjust [] per CCU team   Adjusted Fluid Recommendations:   mL/day       Nutrient Intake: NPO        [] Previous Nutrition Diagnosis: Inadequate protein energy intake            [x] Ongoing          [] Resolved    [] No active nutrition diagnosis identified at this time       Nutrition Intervention meal/snack, med food supplements      Goal/Expected Outcome: Pt to adv diet and consume >75% of meal tray in 4 days      Indicator/Monitoring: RD to monitor energy intake, diet order, body comp, NFPF, glucose profile.     Recommendation: When medically feasible, advance to clear liquids. Once tolerated, advance to full liquids, then Dysphagia 3 soft, thin liquids, DASH/TLC, consistent carb (evening snack) diet w/ glucerna BID

## 2019-10-07 NOTE — PRE-ANESTHESIA EVALUATION ADULT - NSANTHOSAYNRD_GEN_A_CORE
No. DANNA screening performed.  STOP BANG Legend: 0-2 = LOW Risk; 3-4 = INTERMEDIATE Risk; 5-8 = HIGH Risk

## 2019-10-07 NOTE — DISCHARGE NOTE PROVIDER - NSDCFUSCHEDAPPT_GEN_ALL_CORE_FT
GUS WILBURN ; 10/25/2019 ; [facility] [address] [place] [address] GUS WILBURN ; 10/25/2019 ; [facility] [address] [place] [address]

## 2019-10-07 NOTE — DISCHARGE NOTE PROVIDER - NSDCACTIVITY_GEN_ALL_CORE
Do not drive or operate machinery/Walking - Indoors allowed/Showering allowed/No heavy lifting/straining/Do not make important decisions/Walking - Outdoors allowed

## 2019-10-07 NOTE — BRIEF OPERATIVE NOTE - NSICDXBRIEFPREOP_GEN_ALL_CORE_FT
PRE-OP DIAGNOSIS:  Coronary artery disease involving native heart with unstable angina pectoris 07-Oct-2019 11:15:08  Azar Crespo

## 2019-10-07 NOTE — DISCHARGE NOTE PROVIDER - NSDCFUADDAPPT_GEN_ALL_CORE_FT
an appointment has been made to see Dr. roldan on October    at an appointment has been made to see Dr. roldan on October 21  at 9am    an appointment has been made to see Dr Warren of Endocrine Nov 13 at 4 pm      please call to make appointment for cardiology; GI for EGD, and Dr Gandara, Heart Failure specialist, whose other office is located at [address] [place] [address]

## 2019-10-07 NOTE — DISCHARGE NOTE PROVIDER - CARE PROVIDERS DIRECT ADDRESSES
,DirectAddress_Unknown,DirectAddress_Unknown,DirectAddress_Unknown ,DirectAddress_Unknown,DirectAddress_Unknown,DirectAddress_Unknown,DirectAddress_Unknown,[email or web address],DirectAddress_Unknown

## 2019-10-07 NOTE — BRIEF OPERATIVE NOTE - NSICDXBRIEFPROCEDURE_GEN_ALL_CORE_FT
PROCEDURES:  CABG, using 1 arterial and 2 venous grafts 07-Oct-2019 11:16:34 Utilizing Left NATHANIEL to LAD and reverse saphenous vein grafts to the PDA and OM  35mm Clip applied to left atrial appendge Azar Crespo

## 2019-10-08 LAB
ALBUMIN SERPL ELPH-MCNC: 4 G/DL — SIGNIFICANT CHANGE UP (ref 3.5–5.2)
ALBUMIN SERPL ELPH-MCNC: 4.1 G/DL — SIGNIFICANT CHANGE UP (ref 3.5–5.2)
ALP SERPL-CCNC: 125 U/L — HIGH (ref 30–115)
ALP SERPL-CCNC: 90 U/L — SIGNIFICANT CHANGE UP (ref 30–115)
ALT FLD-CCNC: 24 U/L — SIGNIFICANT CHANGE UP (ref 0–41)
ALT FLD-CCNC: 39 U/L — SIGNIFICANT CHANGE UP (ref 0–41)
ANION GAP SERPL CALC-SCNC: 11 MMOL/L — SIGNIFICANT CHANGE UP (ref 7–14)
ANION GAP SERPL CALC-SCNC: 11 MMOL/L — SIGNIFICANT CHANGE UP (ref 7–14)
APTT BLD: 33.3 SEC — SIGNIFICANT CHANGE UP (ref 27–39.2)
AST SERPL-CCNC: 49 U/L — HIGH (ref 0–41)
AST SERPL-CCNC: 75 U/L — HIGH (ref 0–41)
BASE EXCESS BLDA CALC-SCNC: -3.6 MMOL/L — LOW (ref -2–2)
BASE EXCESS BLDA CALC-SCNC: -3.7 MMOL/L — LOW (ref -2–2)
BASE EXCESS BLDA CALC-SCNC: -5.1 MMOL/L — LOW (ref -2–2)
BASOPHILS # BLD AUTO: 0.03 K/UL — SIGNIFICANT CHANGE UP (ref 0–0.2)
BASOPHILS NFR BLD AUTO: 0.1 % — SIGNIFICANT CHANGE UP (ref 0–1)
BILIRUB SERPL-MCNC: 0.2 MG/DL — SIGNIFICANT CHANGE UP (ref 0.2–1.2)
BILIRUB SERPL-MCNC: 0.3 MG/DL — SIGNIFICANT CHANGE UP (ref 0.2–1.2)
BUN SERPL-MCNC: 41 MG/DL — HIGH (ref 10–20)
BUN SERPL-MCNC: 44 MG/DL — HIGH (ref 10–20)
CALCIUM SERPL-MCNC: 8.6 MG/DL — SIGNIFICANT CHANGE UP (ref 8.5–10.1)
CALCIUM SERPL-MCNC: 8.6 MG/DL — SIGNIFICANT CHANGE UP (ref 8.5–10.1)
CHLORIDE SERPL-SCNC: 109 MMOL/L — SIGNIFICANT CHANGE UP (ref 98–110)
CHLORIDE SERPL-SCNC: 110 MMOL/L — SIGNIFICANT CHANGE UP (ref 98–110)
CO2 SERPL-SCNC: 19 MMOL/L — SIGNIFICANT CHANGE UP (ref 17–32)
CO2 SERPL-SCNC: 20 MMOL/L — SIGNIFICANT CHANGE UP (ref 17–32)
CREAT SERPL-MCNC: 1 MG/DL — SIGNIFICANT CHANGE UP (ref 0.7–1.5)
CREAT SERPL-MCNC: 1.1 MG/DL — SIGNIFICANT CHANGE UP (ref 0.7–1.5)
EOSINOPHIL # BLD AUTO: 0 K/UL — SIGNIFICANT CHANGE UP (ref 0–0.7)
EOSINOPHIL NFR BLD AUTO: 0 % — SIGNIFICANT CHANGE UP (ref 0–8)
GAS PNL BLDA: SIGNIFICANT CHANGE UP
GLUCOSE BLDC GLUCOMTR-MCNC: 105 MG/DL — HIGH (ref 70–99)
GLUCOSE BLDC GLUCOMTR-MCNC: 127 MG/DL — HIGH (ref 70–99)
GLUCOSE BLDC GLUCOMTR-MCNC: 143 MG/DL — HIGH (ref 70–99)
GLUCOSE BLDC GLUCOMTR-MCNC: 147 MG/DL — HIGH (ref 70–99)
GLUCOSE BLDC GLUCOMTR-MCNC: 161 MG/DL — HIGH (ref 70–99)
GLUCOSE BLDC GLUCOMTR-MCNC: 162 MG/DL — HIGH (ref 70–99)
GLUCOSE BLDC GLUCOMTR-MCNC: 178 MG/DL — HIGH (ref 70–99)
GLUCOSE BLDC GLUCOMTR-MCNC: 183 MG/DL — HIGH (ref 70–99)
GLUCOSE BLDC GLUCOMTR-MCNC: 183 MG/DL — HIGH (ref 70–99)
GLUCOSE BLDC GLUCOMTR-MCNC: 204 MG/DL — HIGH (ref 70–99)
GLUCOSE BLDC GLUCOMTR-MCNC: 230 MG/DL — HIGH (ref 70–99)
GLUCOSE BLDC GLUCOMTR-MCNC: 231 MG/DL — HIGH (ref 70–99)
GLUCOSE SERPL-MCNC: 162 MG/DL — HIGH (ref 70–99)
GLUCOSE SERPL-MCNC: 211 MG/DL — HIGH (ref 70–99)
HCO3 BLDA-SCNC: 20 MMOL/L — LOW (ref 23–27)
HCO3 BLDA-SCNC: 21 MMOL/L — LOW (ref 23–27)
HCO3 BLDA-SCNC: 22 MMOL/L — LOW (ref 23–27)
HCT VFR BLD CALC: 22.2 % — LOW (ref 37–47)
HCT VFR BLD CALC: 29 % — LOW (ref 37–47)
HGB BLD-MCNC: 7.1 G/DL — LOW (ref 12–16)
HGB BLD-MCNC: 9.2 G/DL — LOW (ref 12–16)
HOROWITZ INDEX BLDA+IHG-RTO: 32 — SIGNIFICANT CHANGE UP
HOROWITZ INDEX BLDA+IHG-RTO: 40 — SIGNIFICANT CHANGE UP
IMM GRANULOCYTES NFR BLD AUTO: 1.3 % — HIGH (ref 0.1–0.3)
INR BLD: 1.05 RATIO — SIGNIFICANT CHANGE UP (ref 0.65–1.3)
LYMPHOCYTES # BLD AUTO: 0.39 K/UL — LOW (ref 1.2–3.4)
LYMPHOCYTES # BLD AUTO: 1.9 % — LOW (ref 20.5–51.1)
MAGNESIUM SERPL-MCNC: 3.2 MG/DL — CRITICAL HIGH (ref 1.8–2.4)
MCHC RBC-ENTMCNC: 27.3 PG — SIGNIFICANT CHANGE UP (ref 27–31)
MCHC RBC-ENTMCNC: 27.3 PG — SIGNIFICANT CHANGE UP (ref 27–31)
MCHC RBC-ENTMCNC: 31.7 G/DL — LOW (ref 32–37)
MCHC RBC-ENTMCNC: 32 G/DL — SIGNIFICANT CHANGE UP (ref 32–37)
MCV RBC AUTO: 85.4 FL — SIGNIFICANT CHANGE UP (ref 81–99)
MCV RBC AUTO: 86.1 FL — SIGNIFICANT CHANGE UP (ref 81–99)
MONOCYTES # BLD AUTO: 0.68 K/UL — HIGH (ref 0.1–0.6)
MONOCYTES NFR BLD AUTO: 3.4 % — SIGNIFICANT CHANGE UP (ref 1.7–9.3)
NEUTROPHILS # BLD AUTO: 18.68 K/UL — HIGH (ref 1.4–6.5)
NEUTROPHILS NFR BLD AUTO: 93.3 % — HIGH (ref 42.2–75.2)
NRBC # BLD: 0 /100 WBCS — SIGNIFICANT CHANGE UP (ref 0–0)
NRBC # BLD: 0 /100 WBCS — SIGNIFICANT CHANGE UP (ref 0–0)
OB PNL STL: POSITIVE
PCO2 BLDA: 35 MMHG — LOW (ref 38–42)
PCO2 BLDA: 35 MMHG — LOW (ref 38–42)
PCO2 BLDA: 40 MMHG — SIGNIFICANT CHANGE UP (ref 38–42)
PH BLDA: 7.34 — LOW (ref 7.38–7.42)
PH BLDA: 7.36 — LOW (ref 7.38–7.42)
PH BLDA: 7.38 — SIGNIFICANT CHANGE UP (ref 7.38–7.42)
PLATELET # BLD AUTO: 144 K/UL — SIGNIFICANT CHANGE UP (ref 130–400)
PLATELET # BLD AUTO: 214 K/UL — SIGNIFICANT CHANGE UP (ref 130–400)
PO2 BLDA: 116 MMHG — HIGH (ref 78–95)
PO2 BLDA: 77 MMHG — LOW (ref 78–95)
PO2 BLDA: 82 MMHG — SIGNIFICANT CHANGE UP (ref 78–95)
POTASSIUM SERPL-MCNC: 4.5 MMOL/L — SIGNIFICANT CHANGE UP (ref 3.5–5)
POTASSIUM SERPL-MCNC: 4.8 MMOL/L — SIGNIFICANT CHANGE UP (ref 3.5–5)
POTASSIUM SERPL-SCNC: 4.5 MMOL/L — SIGNIFICANT CHANGE UP (ref 3.5–5)
POTASSIUM SERPL-SCNC: 4.8 MMOL/L — SIGNIFICANT CHANGE UP (ref 3.5–5)
PROT SERPL-MCNC: 5.2 G/DL — LOW (ref 6–8)
PROT SERPL-MCNC: 5.3 G/DL — LOW (ref 6–8)
PROTHROM AB SERPL-ACNC: 12.1 SEC — SIGNIFICANT CHANGE UP (ref 9.95–12.87)
RBC # BLD: 2.6 M/UL — LOW (ref 4.2–5.4)
RBC # BLD: 3.37 M/UL — LOW (ref 4.2–5.4)
RBC # FLD: 14.7 % — HIGH (ref 11.5–14.5)
RBC # FLD: 15.2 % — HIGH (ref 11.5–14.5)
SAO2 % BLDA: 97 % — SIGNIFICANT CHANGE UP (ref 94–98)
SAO2 % BLDA: 97 % — SIGNIFICANT CHANGE UP (ref 94–98)
SAO2 % BLDA: 99 % — HIGH (ref 94–98)
SODIUM SERPL-SCNC: 139 MMOL/L — SIGNIFICANT CHANGE UP (ref 135–146)
SODIUM SERPL-SCNC: 141 MMOL/L — SIGNIFICANT CHANGE UP (ref 135–146)
WBC # BLD: 12 K/UL — HIGH (ref 4.8–10.8)
WBC # BLD: 20.04 K/UL — HIGH (ref 4.8–10.8)
WBC # FLD AUTO: 12 K/UL — HIGH (ref 4.8–10.8)
WBC # FLD AUTO: 20.04 K/UL — HIGH (ref 4.8–10.8)

## 2019-10-08 PROCEDURE — 99291 CRITICAL CARE FIRST HOUR: CPT

## 2019-10-08 PROCEDURE — 71045 X-RAY EXAM CHEST 1 VIEW: CPT | Mod: 26

## 2019-10-08 PROCEDURE — 93010 ELECTROCARDIOGRAM REPORT: CPT

## 2019-10-08 RX ORDER — INSULIN LISPRO 100/ML
10 VIAL (ML) SUBCUTANEOUS
Refills: 0 | Status: DISCONTINUED | OUTPATIENT
Start: 2019-10-08 | End: 2019-10-15

## 2019-10-08 RX ORDER — ALBUMIN HUMAN 25 %
500 VIAL (ML) INTRAVENOUS ONCE
Refills: 0 | Status: COMPLETED | OUTPATIENT
Start: 2019-10-08 | End: 2019-10-08

## 2019-10-08 RX ORDER — PANTOPRAZOLE SODIUM 20 MG/1
40 TABLET, DELAYED RELEASE ORAL
Refills: 0 | Status: DISCONTINUED | OUTPATIENT
Start: 2019-10-08 | End: 2019-10-08

## 2019-10-08 RX ORDER — NOREPINEPHRINE BITARTRATE/D5W 8 MG/250ML
0.05 PLASTIC BAG, INJECTION (ML) INTRAVENOUS
Qty: 8 | Refills: 0 | Status: DISCONTINUED | OUTPATIENT
Start: 2019-10-08 | End: 2019-10-09

## 2019-10-08 RX ORDER — TRAMADOL HYDROCHLORIDE 50 MG/1
25 TABLET ORAL EVERY 4 HOURS
Refills: 0 | Status: DISCONTINUED | OUTPATIENT
Start: 2019-10-08 | End: 2019-10-09

## 2019-10-08 RX ORDER — GLUCAGON INJECTION, SOLUTION 0.5 MG/.1ML
1 INJECTION, SOLUTION SUBCUTANEOUS ONCE
Refills: 0 | Status: DISCONTINUED | OUTPATIENT
Start: 2019-10-08 | End: 2019-10-18

## 2019-10-08 RX ORDER — VANCOMYCIN HCL 1 G
125 VIAL (EA) INTRAVENOUS EVERY 6 HOURS
Refills: 0 | Status: DISCONTINUED | OUTPATIENT
Start: 2019-10-08 | End: 2019-10-09

## 2019-10-08 RX ORDER — PANTOPRAZOLE SODIUM 20 MG/1
50 TABLET, DELAYED RELEASE ORAL ONCE
Refills: 0 | Status: COMPLETED | OUTPATIENT
Start: 2019-10-08 | End: 2019-10-08

## 2019-10-08 RX ORDER — SODIUM CHLORIDE 9 MG/ML
1000 INJECTION, SOLUTION INTRAVENOUS
Refills: 0 | Status: DISCONTINUED | OUTPATIENT
Start: 2019-10-08 | End: 2019-10-18

## 2019-10-08 RX ORDER — SODIUM BICARBONATE 1 MEQ/ML
50 SYRINGE (ML) INTRAVENOUS ONCE
Refills: 0 | Status: COMPLETED | OUTPATIENT
Start: 2019-10-08 | End: 2019-10-08

## 2019-10-08 RX ORDER — DEXTROSE 50 % IN WATER 50 %
15 SYRINGE (ML) INTRAVENOUS ONCE
Refills: 0 | Status: DISCONTINUED | OUTPATIENT
Start: 2019-10-08 | End: 2019-10-18

## 2019-10-08 RX ORDER — IPRATROPIUM/ALBUTEROL SULFATE 18-103MCG
3 AEROSOL WITH ADAPTER (GRAM) INHALATION EVERY 6 HOURS
Refills: 0 | Status: DISCONTINUED | OUTPATIENT
Start: 2019-10-08 | End: 2019-10-10

## 2019-10-08 RX ORDER — INSULIN GLARGINE 100 [IU]/ML
30 INJECTION, SOLUTION SUBCUTANEOUS EVERY MORNING
Refills: 0 | Status: DISCONTINUED | OUTPATIENT
Start: 2019-10-08 | End: 2019-10-11

## 2019-10-08 RX ORDER — ALPRAZOLAM 0.25 MG
0.25 TABLET ORAL ONCE
Refills: 0 | Status: DISCONTINUED | OUTPATIENT
Start: 2019-10-08 | End: 2019-10-09

## 2019-10-08 RX ORDER — PANTOPRAZOLE SODIUM 20 MG/1
8 TABLET, DELAYED RELEASE ORAL
Qty: 80 | Refills: 0 | Status: DISCONTINUED | OUTPATIENT
Start: 2019-10-08 | End: 2019-10-09

## 2019-10-08 RX ORDER — ALBUMIN HUMAN 25 %
250 VIAL (ML) INTRAVENOUS ONCE
Refills: 0 | Status: COMPLETED | OUTPATIENT
Start: 2019-10-08 | End: 2019-10-08

## 2019-10-08 RX ORDER — ATORVASTATIN CALCIUM 80 MG/1
40 TABLET, FILM COATED ORAL AT BEDTIME
Refills: 0 | Status: DISCONTINUED | OUTPATIENT
Start: 2019-10-08 | End: 2019-10-18

## 2019-10-08 RX ADMIN — CHLORHEXIDINE GLUCONATE 5 MILLILITER(S): 213 SOLUTION TOPICAL at 22:05

## 2019-10-08 RX ADMIN — Medication 250 MILLILITER(S): at 16:30

## 2019-10-08 RX ADMIN — Medication 100 MILLIGRAM(S): at 09:52

## 2019-10-08 RX ADMIN — PANTOPRAZOLE SODIUM 50 MILLIGRAM(S): 20 TABLET, DELAYED RELEASE ORAL at 20:00

## 2019-10-08 RX ADMIN — MILRINONE LACTATE 8.58 MICROGRAM(S)/KG/MIN: 1 INJECTION, SOLUTION INTRAVENOUS at 17:45

## 2019-10-08 RX ADMIN — CHLORHEXIDINE GLUCONATE 5 MILLILITER(S): 213 SOLUTION TOPICAL at 11:04

## 2019-10-08 RX ADMIN — Medication 250 MILLILITER(S): at 14:20

## 2019-10-08 RX ADMIN — FENTANYL CITRATE 25 MICROGRAM(S): 50 INJECTION INTRAVENOUS at 08:27

## 2019-10-08 RX ADMIN — CHLORHEXIDINE GLUCONATE 15 MILLILITER(S): 213 SOLUTION TOPICAL at 17:51

## 2019-10-08 RX ADMIN — Medication 100 GRAM(S): at 12:08

## 2019-10-08 RX ADMIN — Medication 3 MILLILITER(S): at 15:06

## 2019-10-08 RX ADMIN — Medication 325 MILLIGRAM(S): at 11:04

## 2019-10-08 RX ADMIN — TRAMADOL HYDROCHLORIDE 25 MILLIGRAM(S): 50 TABLET ORAL at 12:04

## 2019-10-08 RX ADMIN — CHLORHEXIDINE GLUCONATE 5 MILLILITER(S): 213 SOLUTION TOPICAL at 13:10

## 2019-10-08 RX ADMIN — Medication 50 MILLIEQUIVALENT(S): at 17:30

## 2019-10-08 RX ADMIN — PANTOPRAZOLE SODIUM 40 MILLIGRAM(S): 20 TABLET, DELAYED RELEASE ORAL at 11:01

## 2019-10-08 RX ADMIN — FENTANYL CITRATE 25 MICROGRAM(S): 50 INJECTION INTRAVENOUS at 06:58

## 2019-10-08 RX ADMIN — TRAMADOL HYDROCHLORIDE 25 MILLIGRAM(S): 50 TABLET ORAL at 19:06

## 2019-10-08 RX ADMIN — Medication 125 MILLIGRAM(S): at 12:53

## 2019-10-08 RX ADMIN — CHLORHEXIDINE GLUCONATE 1 APPLICATION(S): 213 SOLUTION TOPICAL at 07:08

## 2019-10-08 RX ADMIN — FENTANYL CITRATE 25 MICROGRAM(S): 50 INJECTION INTRAVENOUS at 07:13

## 2019-10-08 RX ADMIN — INSULIN GLARGINE 30 UNIT(S): 100 INJECTION, SOLUTION SUBCUTANEOUS at 10:01

## 2019-10-08 RX ADMIN — Medication 125 MILLILITER(S): at 11:57

## 2019-10-08 RX ADMIN — Medication 10 UNIT(S): at 16:27

## 2019-10-08 RX ADMIN — PANTOPRAZOLE SODIUM 10 MG/HR: 20 TABLET, DELAYED RELEASE ORAL at 18:00

## 2019-10-08 RX ADMIN — Medication 125 MILLIGRAM(S): at 17:50

## 2019-10-08 RX ADMIN — Medication 100 MILLIGRAM(S): at 03:06

## 2019-10-08 RX ADMIN — Medication 10 UNIT(S): at 13:10

## 2019-10-08 RX ADMIN — TRAMADOL HYDROCHLORIDE 25 MILLIGRAM(S): 50 TABLET ORAL at 20:00

## 2019-10-08 RX ADMIN — ATORVASTATIN CALCIUM 40 MILLIGRAM(S): 80 TABLET, FILM COATED ORAL at 22:05

## 2019-10-08 NOTE — PROGRESS NOTE ADULT - ATTENDING COMMENTS
POD 1 after CABG  pt is doing well  extubated  cont milr @ 0.375  CO/CI adequate  d/c fem line  OOB  pt with evidence of diarrhea  guaiac positive, yet hct is stable  trend Hct/ABGs   start atorvastatin  hold Asa/SQH  hold BB as pt is on milr gtt  d/c meds, keep pleural CT in place  case d/w CTU team on multiple occasions

## 2019-10-08 NOTE — CONSULT NOTE ADULT - ASSESSMENT
67 yo F day 1 post CABG with diarrhea (recent cdiff) and acute hgb drop. Report of melena which could not be corroborated on MAKAYLA. There is a documented + occult test.    1)Cdiff ?first episode?  2)Acute drop in Hgb  3)Occult GI bleeding (Hx of stercoral ulcer?) cannot rule out other sources of GI bleed- No overt active GI bleeding  4)CAD SP CABG    Rec:  PPI drip  Repeat CDIFF PCR  continue vanc  ID follow up  Repeat CBC  Maintain active type and screen  Suggested CTA- deferred by team given concerns for SHIVA  Continue NPO  EGD after stabilization or earlier on emergent basis should active bleeding experienced    Discussed with attending 67 yo F day 1 post CABG with diarrhea (recent cdiff) and acute hgb drop. Report of melena which could not be corroborated on MAKAYLA. There is a documented + occult test.  + cmv in an otherwise not known immune compromised pt. No Hx of IBD.    1)Cdiff ?first episode?  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

## 2019-10-08 NOTE — PHYSICAL THERAPY INITIAL EVALUATION ADULT - TINETTI GAIT TEST, REHAB EVAL
Based on this exam, patient is a high risk to fall.
Based on Tinetti gait test, pt is at high risk for falls.

## 2019-10-08 NOTE — PHYSICAL THERAPY INITIAL EVALUATION ADULT - PHYSICAL ASSIST/NONPHYSICAL ASSIST: STAND/SIT, REHAB EVAL
Chair. Cueing for safety, hand placement, CW use, posture./nonverbal cues (demo/gestures)/verbal cues/1 person assist

## 2019-10-08 NOTE — PROGRESS NOTE ADULT - SUBJECTIVE AND OBJECTIVE BOX
OPERATIVE PROCEDURE(s):     CABG x 3/LAAC          POD #  1                     68yFemale  SURGEON(s): CHINA Escobar  SUBJECTIVE ASSESSMENT:  Patient has no complaints at this time.    Vital Signs Last 24 Hrs  T(F): 97.5 (08 Oct 2019 08:00), Max: 98.4 (07 Oct 2019 21:00)  HR: 79 (08 Oct 2019 08:00) (75 - 115) A-Paced  BP: 96/54 (08 Oct 2019 08:00) (83/52 - 142/71)  BP(mean): 69 (08 Oct 2019 08:00) (62 - 97)  ABP: 110/52 (08 Oct 2019 08:00) (82/41 - 153/64)  ABP(mean): 69 (08 Oct 2019 08:00)  RR: 22 (08 Oct 2019 08:00) (12 - 41)  SpO2: 97% (08 Oct 2019 08:00) (94% - 100%) 2LNC  CVP(mm Hg): 11 (08 Oct 2019 08:00)  CO: 4.2 (08 Oct 2019 08:00)  CI: 2.6 (08 Oct 2019 08:00)  PA: 43/21 (08 Oct 2019 08:00)  SVR: 1094 (08 Oct 2019 08:00)      I&O's Detail    07 Oct 2019 07:01  -  08 Oct 2019 07:00  --------------------------------------------------------  IN:    Albumin 5%  - 500 mL: 1125 mL    amiodarone Infusion: 166.5 mL    amiodarone Infusion: 167 mL    dexmedetomidine Infusion: 7.2 mL    insulin regular Infusion: 73 mL    IV PiggyBack: 200 mL    milrinone  Infusion: 108.8 mL    nitroglycerin  Infusion: 10 mL    norepinephrine Infusion: 81.4 mL    Oral Fluid: 100 mL    Packed Red Blood Cells: 653 mL    sodium chloride 0.9%.: 340 mL    vasopressin Infusion: 36 mL  Total IN: 3067.9 mL    OUT:    Chest Tube: 480 mL    Chest Tube: 470 mL    Indwelling Catheter - Urethral: 1465 mL  Total OUT: 2415 mL        Net:   I&O's Detail    06 Oct 2019 07:01  -  07 Oct 2019 07:00  --------------------------------------------------------  Total NET: -825 mL      07 Oct 2019 07:01  -  08 Oct 2019 07:00  --------------------------------------------------------  Total NET: 652.8 mL        CAPILLARY BLOOD GLUCOSE  173 (08 Oct 2019 01:00)  210 (07 Oct 2019 23:00)  219 (07 Oct 2019 22:30)  229 (07 Oct 2019 21:30)  232 (07 Oct 2019 20:00)      POCT Blood Glucose.: 143 mg/dL (08 Oct 2019 08:33)  POCT Blood Glucose.: 105 mg/dL (08 Oct 2019 06:50)  POCT Blood Glucose.: 127 mg/dL (08 Oct 2019 02:57)  POCT Blood Glucose.: 161 mg/dL (08 Oct 2019 01:33)  POCT Blood Glucose.: 183 mg/dL (08 Oct 2019 00:18)  POCT Blood Glucose.: 85 mg/dL (07 Oct 2019 16:11)    Physical Exam:  General: NAD; A&Ox3  Cardiac: S1/S2, RRR, no murmur, no rubs  Lungs: unlabored respirations, CTA b/l, no wheeze, no rales, no crackles  Abdomen: Soft/NT/ND; positive bowel sounds x 4  Sternum: Intact, no click, incision healing well with no drainage  Incisions: Incisions clean/dry/intact  Extremities: No edema b/l lower extremities; good capillary refill; no cyanosis; palpable 1+ pedal pulses b/l    Central Venous Catheter: Yes[x]  No[] , If Yes indication:           Day #1  Cast[place]on Catheter: Yes  [x] , No  [] , If yes indication:                      Day #1  NGT: Yes [] No [x] ,    If Yes Placement:                                     Day #  EPICARDIAL WIRES:  [x] YES [] NO                                              Day #1  BOWEL MOVEMENT:  [x] YES [] NO, If No, Timing since last BM:      Day #  CHEST TUBE(Left/Right):  [x] YES [] NO, If yes -  AIR LEAKS:  [] YES [x] NO        LABS:                        9.2<L>  20.04<H> )-----------( 214      ( 08 Oct 2019 01:30 )             29.0<L>                        7.5<L>  15.96<H> )-----------( 193      ( 07 Oct 2019 14:19 )             23.6<L>    10-08    141  |  110  |  41<H>  ----------------------------<  162<H>  4.5   |  20  |  1.0  10-07    145  |  111<H>  |  43<H>  ----------------------------<  112<H>  4.7   |  21  |  0.9    Ca    8.6      08 Oct 2019 01:30  Mg     3.2     10-08    TPro  5.3<L> [6.0 - 8.0]  /  Alb  4.0 [3.5 - 5.2]  /  TBili  0.2 [0.2 - 1.2]  /  DBili  x   /  AST  75<H> [0 - 41]  /  ALT  39 [0 - 41]  /  AlkPhos  90 [30 - 115]  10-08    PT/INR - ( 08 Oct 2019 01:30 )   PT: ;   INR: 1.05 ratio         PT/INR - ( 07 Oct 2019 14:19 )   PT: ;   INR: 1.21 ratio         PTT - ( 08 Oct 2019 01:30 )  PTT:33.3 sec, PTT - ( 07 Oct 2019 14:19 )  PTT:29.5 sec    ABG - ( 08 Oct 2019 05:34 )  pH: 7.35  /  pCO2: 38    /  pO2: 120   / HCO3: 21    / Base Excess: -3.8  /  SaO2: 99    /  LA: 0.7              RADIOLOGY & ADDITIONAL TESTS:  CXR: Xray Chest 1 View- PORTABLE-Routine:   EXAM:  XR CHEST PORTABLE ROUTINE 1V            PROCEDURE DATE:  10/08/2019            INTERPRETATION:  Clinical History / Reason for exam: Post cardiac surgery.    Comparison : Chest radiograph 10/7/2019.    Technique/Positioning: Frontal portable.    Findings:    Support devices: Interval extubation and removal of enteric tube. Right   IJ [place] catheter with tip overlying the main pulmonary artery.   Stable bilateral chest tubes.     Cardiac/mediastinum/hilum: Enlarged cardiac silhouette. Post median   sternotomy. Atrial appendage clip noted.    Lung parenchyma/Pleura: Improving interstitial edema. Stable left   retrocardiac opacity.    Skeleton/soft tissues: Unchanged.    Impression:      Improving interstitial edema. Stable left retrocardiac opacity.    Interval extubation and removal of enteric tube.              MARTINA CLIFTON M.D., RESIDENT RADIOLOGIST  This document has been electronically signed.  DYANA NGUYEN M.D., ATTENDING RADIOLOGIST  This document has been electronically signed. Oct  8 2019  8:29AM             (10-08-19 @ 05:26)    MEDICATIONS  (STANDING):  albumin human  5% IVPB 125 milliLiter(s) IV Intermittent once  ALBUTerol    90 MICROgram(s) HFA Inhaler 2 Puff(s) Inhalation every 6 hours  amiodarone Infusion 0.5 mG/Min (16.667 mL/Hr) IV Continuous <Continuous>  aspirin enteric coated 325 milliGRAM(s) Oral daily  ceFAZolin   IVPB 1000 milliGRAM(s) IV Intermittent every 8 hours  chlorhexidine 0.12% Liquid 5 milliLiter(s) Oral Mucosa every 4 hours  chlorhexidine 0.12% Liquid 15 milliLiter(s) Oral Mucosa every 12 hours  chlorhexidine 4% Liquid 1 Application(s) Topical <User Schedule>  dexmedetomidine Infusion 0.25 MICROgram(s)/kG/Hr (3.575 mL/Hr) IV Continuous <Continuous>  dextrose 50% Injectable 50 milliLiter(s) IV Push every 15 minutes  dextrose 50% Injectable 25 milliLiter(s) IV Push every 15 minutes  docusate sodium 100 milliGRAM(s) Oral three times a day  guaiFENesin  milliGRAM(s) Oral every 12 hours  insulin regular Infusion 1 Unit(s)/Hr (1 mL/Hr) IV Continuous <Continuous>  ipratropium 17 MICROgram(s) HFA Inhaler 2 Puff(s) Inhalation every 6 hours  magnesium sulfate  IVPB 1 Gram(s) IV Intermittent daily  meperidine     Injectable 25 milliGRAM(s) IV Push once  milrinone Infusion 0.5 MICROgram(s)/kG/Min (8.58 mL/Hr) IV Continuous <Continuous>  niCARdipine Infusion 5 mG/Hr (25 mL/Hr) IV Continuous <Continuous>  nitroglycerin  Infusion 5 MICROgram(s)/Min (1.5 mL/Hr) IV Continuous <Continuous>  norepinephrine Infusion 0.05 MICROgram(s)/kG/Min (5.363 mL/Hr) IV Continuous <Continuous>  pantoprazole  Injectable 40 milliGRAM(s) IV Push daily  polyethylene glycol 3350 17 Gram(s) Oral daily  propofol Infusion 10 MICROgram(s)/kG/Min (3.432 mL/Hr) IV Continuous <Continuous>  sodium chloride 0.9%. 1000 milliLiter(s) (10 mL/Hr) IV Continuous <Continuous>  vasopressin Infusion 0.04 Unit(s)/Min (2.4 mL/Hr) IV Continuous <Continuous>    MEDICATIONS  (PRN):  ondansetron  IVPB 4 milliGRAM(s) IV Intermittent once PRN Nausea and/or Vomiting  oxyCODONE    IR 5 milliGRAM(s) Oral every 4 hours PRN Moderate Pain (4 - 6)    HEPARIN:  [x] YES [] NO  Dose: 5000 UNITS Q8H	  SCD's: YES b/l  GI Prophylaxis: Protonix [x], Pepcid [], None [], (Contra-indication:.....)    Post-Op Aspirin: Yes [x],  No [], If No, then contra-indication:  Post-Op Statin: Yes [x], No[], If No, then contra-indication:  Post-Op Beta-Blockers: Yes [], No [x], If No, then contra-indication: Hold BB for bradycardia    Allergies:  No Known Allergies      Ambulation/Activity Status: OOB to chair    Assessment/Plan:  68y Female status-post CABG and LAAC  - Case and plan discussed with CTU Intensivist and CT Surgeon - Dr. Urbano/Flaco/Shawn   - Continue CTU supportive care    - Continue DVT/GI prophylaxis  - Incentive Spirometry 10 times an hour  - Continue to advance physical activity as tolerated and continue PT/OT as directed  1. CAD: Continue ASA, start statin, hold BB for Bradycardia - HR in the 40s off pacemaker  2. DM - cont insulin gtt - will start Lantus/Humalog when patient advances diet  3. Bradycardia - cont to hold BB and d/c amiodarone  4. Low Cardiac output/EF of 20% - cont Primacor at 0.375 mcg/kg/min; maintain swan marianna catheter  5. OOB to chair with swan marianna and tubes in place  6. Follow-up stool guaic  7. If diarrhea persists consider rectal tube placement; consider immodium OPERATIVE PROCEDURE(s):     CABG x 3/LAAC          POD #  1                     68yFemale  SURGEON(s): CHINA Escobar  SUBJECTIVE ASSESSMENT:  Patient has no complaints at this time.    Vital Signs Last 24 Hrs  T(F): 97.5 (08 Oct 2019 08:00), Max: 98.4 (07 Oct 2019 21:00)  HR: 79 (08 Oct 2019 08:00) (75 - 115) A-Paced  BP: 96/54 (08 Oct 2019 08:00) (83/52 - 142/71)  BP(mean): 69 (08 Oct 2019 08:00) (62 - 97)  ABP: 110/52 (08 Oct 2019 08:00) (82/41 - 153/64)  ABP(mean): 69 (08 Oct 2019 08:00)  RR: 22 (08 Oct 2019 08:00) (12 - 41)  SpO2: 97% (08 Oct 2019 08:00) (94% - 100%) 2LNC  CVP(mm Hg): 11 (08 Oct 2019 08:00)  CO: 4.2 (08 Oct 2019 08:00)  CI: 2.6 (08 Oct 2019 08:00)  PA: 43/21 (08 Oct 2019 08:00)  SVR: 1094 (08 Oct 2019 08:00)      I&O's Detail    07 Oct 2019 07:01  -  08 Oct 2019 07:00  --------------------------------------------------------  IN:    Albumin 5%  - 500 mL: 1125 mL    amiodarone Infusion: 166.5 mL    amiodarone Infusion: 167 mL    dexmedetomidine Infusion: 7.2 mL    insulin regular Infusion: 73 mL    IV PiggyBack: 200 mL    milrinone  Infusion: 108.8 mL    nitroglycerin  Infusion: 10 mL    norepinephrine Infusion: 81.4 mL    Oral Fluid: 100 mL    Packed Red Blood Cells: 653 mL    sodium chloride 0.9%.: 340 mL    vasopressin Infusion: 36 mL  Total IN: 3067.9 mL    OUT:    Chest Tube: 480 mL    Chest Tube: 470 mL    Indwelling Catheter - Urethral: 1465 mL  Total OUT: 2415 mL        Net:   I&O's Detail    06 Oct 2019 07:01  -  07 Oct 2019 07:00  --------------------------------------------------------  Total NET: -825 mL      07 Oct 2019 07:01  -  08 Oct 2019 07:00  --------------------------------------------------------  Total NET: 652.8 mL        CAPILLARY BLOOD GLUCOSE  173 (08 Oct 2019 01:00)  210 (07 Oct 2019 23:00)  219 (07 Oct 2019 22:30)  229 (07 Oct 2019 21:30)  232 (07 Oct 2019 20:00)      POCT Blood Glucose.: 143 mg/dL (08 Oct 2019 08:33)  POCT Blood Glucose.: 105 mg/dL (08 Oct 2019 06:50)  POCT Blood Glucose.: 127 mg/dL (08 Oct 2019 02:57)  POCT Blood Glucose.: 161 mg/dL (08 Oct 2019 01:33)  POCT Blood Glucose.: 183 mg/dL (08 Oct 2019 00:18)  POCT Blood Glucose.: 85 mg/dL (07 Oct 2019 16:11)    Physical Exam:  General: NAD; A&Ox3  Cardiac: S1/S2, RRR, no murmur, no rubs  Lungs: unlabored respirations, CTA b/l, no wheeze, no rales, no crackles  Abdomen: Soft/NT/ND; positive bowel sounds x 4  Sternum: Intact, no click, incision healing well with no drainage  Incisions: Incisions clean/dry/intact  Extremities: No edema b/l lower extremities; good capillary refill; no cyanosis; palpable 1+ pedal pulses b/l    Central Venous Catheter: Yes[x]  No[] , If Yes indication:           Day #1  Castellon Catheter: Yes  [x] , No  [] , If yes indication:                      Day #1  NGT: Yes [] No [x] ,    If Yes Placement:                                     Day #  EPICARDIAL WIRES:  [x] YES [] NO                                              Day #1  BOWEL MOVEMENT:  [x] YES [] NO, If No, Timing since last BM:      Day #  CHEST TUBE(Left/Right):  [x] YES [] NO, If yes -  AIR LEAKS:  [] YES [x] NO        LABS:                        9.2<L>  20.04<H> )-----------( 214      ( 08 Oct 2019 01:30 )             29.0<L>                        7.5<L>  15.96<H> )-----------( 193      ( 07 Oct 2019 14:19 )             23.6<L>    10-08    141  |  110  |  41<H>  ----------------------------<  162<H>  4.5   |  20  |  1.0  10-07    145  |  111<H>  |  43<H>  ----------------------------<  112<H>  4.7   |  21  |  0.9    Ca    8.6      08 Oct 2019 01:30  Mg     3.2     10-08    TPro  5.3<L> [6.0 - 8.0]  /  Alb  4.0 [3.5 - 5.2]  /  TBili  0.2 [0.2 - 1.2]  /  DBili  x   /  AST  75<H> [0 - 41]  /  ALT  39 [0 - 41]  /  AlkPhos  90 [30 - 115]  10-08    PT/INR - ( 08 Oct 2019 01:30 )   PT: ;   INR: 1.05 ratio         PT/INR - ( 07 Oct 2019 14:19 )   PT: ;   INR: 1.21 ratio         PTT - ( 08 Oct 2019 01:30 )  PTT:33.3 sec, PTT - ( 07 Oct 2019 14:19 )  PTT:29.5 sec    ABG - ( 08 Oct 2019 05:34 )  pH: 7.35  /  pCO2: 38    /  pO2: 120   / HCO3: 21    / Base Excess: -3.8  /  SaO2: 99    /  LA: 0.7              RADIOLOGY & ADDITIONAL TESTS:  CXR: Xray Chest 1 View- PORTABLE-Routine:   EXAM:  XR CHEST PORTABLE ROUTINE 1V            PROCEDURE DATE:  10/08/2019            INTERPRETATION:  Clinical History / Reason for exam: Post cardiac surgery.    Comparison : Chest radiograph 10/7/2019.    Technique/Positioning: Frontal portable.    Findings:    Support devices: Interval extubation and removal of enteric tube. Right   IJ [place]arianna catheter with tip overlying the main pulmonary artery.   Stable bilateral chest tubes.     Cardiac/mediastinum/hilum: Enlarged cardiac silhouette. Post median   sternotomy. Atrial appendage clip noted.    Lung parenchyma/Pleura: Improving interstitial edema. Stable left   retrocardiac opacity.    Skeleton/soft tissues: Unchanged.    Impression:      Improving interstitial edema. Stable left retrocardiac opacity.    Interval extubation and removal of enteric tube.              MARTINA CLIFTON M.D., RESIDENT RADIOLOGIST  This document has been electronically signed.  DYANA NGUYEN M.D., ATTENDING RADIOLOGIST  This document has been electronically signed. Oct  8 2019  8:29AM             (10-08-19 @ 05:26)    MEDICATIONS  (STANDING):  albumin human  5% IVPB 125 milliLiter(s) IV Intermittent once  ALBUTerol    90 MICROgram(s) HFA Inhaler 2 Puff(s) Inhalation every 6 hours  amiodarone Infusion 0.5 mG/Min (16.667 mL/Hr) IV Continuous <Continuous>  aspirin enteric coated 325 milliGRAM(s) Oral daily  ceFAZolin   IVPB 1000 milliGRAM(s) IV Intermittent every 8 hours  chlorhexidine 0.12% Liquid 5 milliLiter(s) Oral Mucosa every 4 hours  chlorhexidine 0.12% Liquid 15 milliLiter(s) Oral Mucosa every 12 hours  chlorhexidine 4% Liquid 1 Application(s) Topical <User Schedule>  dexmedetomidine Infusion 0.25 MICROgram(s)/kG/Hr (3.575 mL/Hr) IV Continuous <Continuous>  dextrose 50% Injectable 50 milliLiter(s) IV Push every 15 minutes  dextrose 50% Injectable 25 milliLiter(s) IV Push every 15 minutes  docusate sodium 100 milliGRAM(s) Oral three times a day  guaiFENesin  milliGRAM(s) Oral every 12 hours  insulin regular Infusion 1 Unit(s)/Hr (1 mL/Hr) IV Continuous <Continuous>  ipratropium 17 MICROgram(s) HFA Inhaler 2 Puff(s) Inhalation every 6 hours  magnesium sulfate  IVPB 1 Gram(s) IV Intermittent daily  meperidine     Injectable 25 milliGRAM(s) IV Push once  milrinone Infusion 0.5 MICROgram(s)/kG/Min (8.58 mL/Hr) IV Continuous <Continuous>  niCARdipine Infusion 5 mG/Hr (25 mL/Hr) IV Continuous <Continuous>  nitroglycerin  Infusion 5 MICROgram(s)/Min (1.5 mL/Hr) IV Continuous <Continuous>  norepinephrine Infusion 0.05 MICROgram(s)/kG/Min (5.363 mL/Hr) IV Continuous <Continuous>  pantoprazole  Injectable 40 milliGRAM(s) IV Push daily  polyethylene glycol 3350 17 Gram(s) Oral daily  propofol Infusion 10 MICROgram(s)/kG/Min (3.432 mL/Hr) IV Continuous <Continuous>  sodium chloride 0.9%. 1000 milliLiter(s) (10 mL/Hr) IV Continuous <Continuous>  vasopressin Infusion 0.04 Unit(s)/Min (2.4 mL/Hr) IV Continuous <Continuous>    MEDICATIONS  (PRN):  ondansetron  IVPB 4 milliGRAM(s) IV Intermittent once PRN Nausea and/or Vomiting  oxyCODONE    IR 5 milliGRAM(s) Oral every 4 hours PRN Moderate Pain (4 - 6)    HEPARIN:  [x] YES [] NO  Dose: 5000 UNITS Q8H	  SCD's: YES b/l  GI Prophylaxis: Protonix [x], Pepcid [], None [], (Contra-indication:.....)    Post-Op Aspirin: Yes [x],  No [], If No, then contra-indication:  Post-Op Statin: Yes [x], No[], If No, then contra-indication:  Post-Op Beta-Blockers: Yes [], No [x], If No, then contra-indication: Hold BB for bradycardia    Allergies:  No Known Allergies      Ambulation/Activity Status: OOB to chair    Assessment/Plan:  68y Female status-post CABG and LAAC  - Case and plan discussed with CTU Intensivist and CT Surgeon - Dr. Urbano/Flaco/Shawn   - Continue CTU supportive care    - Continue DVT/GI prophylaxis  - Incentive Spirometry 10 times an hour  - Continue to advance physical activity as tolerated and continue PT/OT as directed  1. CAD: Continue ASA, start statin, hold BB for Bradycardia - HR in the 40s off pacemaker  2. DM - cont insulin gtt - will start Lantus/Humalog when patient advances diet  3. Bradycardia - cont to hold BB and d/c amiodarone  4. Low Cardiac output/EF of 20% - cont Primacor at 0.375 mcg/kg/min; maintain swan marianna catheter  5. OOB to chair with swan marianna and tubes in place  6. Follow-up stool guaic  7. If diarrhea persists consider rectal tube placement; consider immodium; as per ID restart PO vancomycin 125mg q6h OPERATIVE PROCEDURE(s):     CABG x 3/LAAC          POD #  1                     68yFemale  SURGEON(s): CHINA Escobar  SUBJECTIVE ASSESSMENT:  Patient has no complaints at this time.    Vital Signs Last 24 Hrs  T(F): 97.5 (08 Oct 2019 08:00), Max: 98.4 (07 Oct 2019 21:00)  HR: 79 (08 Oct 2019 08:00) (75 - 115) A-Paced  BP: 96/54 (08 Oct 2019 08:00) (83/52 - 142/71)  BP(mean): 69 (08 Oct 2019 08:00) (62 - 97)  ABP: 110/52 (08 Oct 2019 08:00) (82/41 - 153/64)  ABP(mean): 69 (08 Oct 2019 08:00)  RR: 22 (08 Oct 2019 08:00) (12 - 41)  SpO2: 97% (08 Oct 2019 08:00) (94% - 100%) 2LNC  CVP(mm Hg): 11 (08 Oct 2019 08:00)  CO: 4.2 (08 Oct 2019 08:00)  CI: 2.6 (08 Oct 2019 08:00)  PA: 43/21 (08 Oct 2019 08:00)  SVR: 1094 (08 Oct 2019 08:00)    I&O's Detail    07 Oct 2019 07:01  -  08 Oct 2019 07:00  --------------------------------------------------------  IN:    Albumin 5%  - 500 mL: 1125 mL    amiodarone Infusion: 166.5 mL    amiodarone Infusion: 167 mL    dexmedetomidine Infusion: 7.2 mL    insulin regular Infusion: 73 mL    IV PiggyBack: 200 mL    milrinone  Infusion: 108.8 mL    nitroglycerin  Infusion: 10 mL    norepinephrine Infusion: 81.4 mL    Oral Fluid: 100 mL    Packed Red Blood Cells: 653 mL    sodium chloride 0.9%.: 340 mL    vasopressin Infusion: 36 mL  Total IN: 3067.9 mL    OUT:    Chest Tube: 480 mL    Chest Tube: 470 mL    Indwelling Catheter - Urethral: 1465 mL  Total OUT: 2415 mL        Net:   I&O's Detail    06 Oct 2019 07:01  -  07 Oct 2019 07:00  --------------------------------------------------------  Total NET: -825 mL      07 Oct 2019 07:01  -  08 Oct 2019 07:00  --------------------------------------------------------  Total NET: 652.8 mL        CAPILLARY BLOOD GLUCOSE  173 (08 Oct 2019 01:00)  210 (07 Oct 2019 23:00)  219 (07 Oct 2019 22:30)  229 (07 Oct 2019 21:30)  232 (07 Oct 2019 20:00)      POCT Blood Glucose.: 143 mg/dL (08 Oct 2019 08:33)  POCT Blood Glucose.: 105 mg/dL (08 Oct 2019 06:50)  POCT Blood Glucose.: 127 mg/dL (08 Oct 2019 02:57)  POCT Blood Glucose.: 161 mg/dL (08 Oct 2019 01:33)  POCT Blood Glucose.: 183 mg/dL (08 Oct 2019 00:18)  POCT Blood Glucose.: 85 mg/dL (07 Oct 2019 16:11)    Physical Exam:  General: NAD; A&Ox3  Cardiac: S1/S2, RRR, no murmur, no rubs  Lungs: unlabored respirations, CTA b/l, no wheeze, no rales, no crackles  Abdomen: Soft/NT/ND; positive bowel sounds x 4  Sternum: Intact, no click, incision healing well with no drainage  Incisions: Incisions clean/dry/intact  Extremities: No edema b/l lower extremities; good capillary refill; no cyanosis; palpable 1+ pedal pulses b/l    Central Venous Catheter: Yes[x]  No[] , If Yes indication:           Day #1  Castellon Catheter: Yes  [x] , No  [] , If yes indication:                      Day #1  NGT: Yes [] No [x] ,    If Yes Placement:                                     Day #  EPICARDIAL WIRES:  [x] YES [] NO                                              Day #1  BOWEL MOVEMENT:  [x] YES [] NO, If No, Timing since last BM:      Day #  CHEST TUBE(Left/Right):  [x] YES [] NO, If yes -  AIR LEAKS:  [] YES [x] NO        LABS:                        9.2<L>  20.04<H> )-----------( 214      ( 08 Oct 2019 01:30 )             29.0<L>                        7.5<L>  15.96<H> )-----------( 193      ( 07 Oct 2019 14:19 )             23.6<L>    10-08    141  |  110  |  41<H>  ----------------------------<  162<H>  4.5   |  20  |  1.0  10-07    145  |  111<H>  |  43<H>  ----------------------------<  112<H>  4.7   |  21  |  0.9    Ca    8.6      08 Oct 2019 01:30  Mg     3.2     10-08    TPro  5.3<L> [6.0 - 8.0]  /  Alb  4.0 [3.5 - 5.2]  /  TBili  0.2 [0.2 - 1.2]  /  DBili  x   /  AST  75<H> [0 - 41]  /  ALT  39 [0 - 41]  /  AlkPhos  90 [30 - 115]  10-08    PT/INR - ( 08 Oct 2019 01:30 )   PT: ;   INR: 1.05 ratio         PT/INR - ( 07 Oct 2019 14:19 )   PT: ;   INR: 1.21 ratio         PTT - ( 08 Oct 2019 01:30 )  PTT:33.3 sec, PTT - ( 07 Oct 2019 14:19 )  PTT:29.5 sec    ABG - ( 08 Oct 2019 05:34 )  pH: 7.35  /  pCO2: 38    /  pO2: 120   / HCO3: 21    / Base Excess: -3.8  /  SaO2: 99    /  LA: 0.7              RADIOLOGY & ADDITIONAL TESTS:  CXR: Xray Chest 1 View- PORTABLE-Routine:   EXAM:  XR CHEST PORTABLE ROUTINE 1V            PROCEDURE DATE:  10/08/2019            INTERPRETATION:  Clinical History / Reason for exam: Post cardiac surgery.    Comparison : Chest radiograph 10/7/2019.    Technique/Positioning: Frontal portable.    Findings:    Support devices: Interval extubation and removal of enteric tube. Right   IJ [place] catheter with tip overlying the main pulmonary artery.   Stable bilateral chest tubes.     Cardiac/mediastinum/hilum: Enlarged cardiac silhouette. Post median   sternotomy. Atrial appendage clip noted.    Lung parenchyma/Pleura: Improving interstitial edema. Stable left   retrocardiac opacity.    Skeleton/soft tissues: Unchanged.    Impression:      Improving interstitial edema. Stable left retrocardiac opacity.    Interval extubation and removal of enteric tube.              MARTINA CLIFTON M.D., RESIDENT RADIOLOGIST  This document has been electronically signed.  DYANA NGUYEN M.D., ATTENDING RADIOLOGIST  This document has been electronically signed. Oct  8 2019  8:29AM             (10-08-19 @ 05:26)    MEDICATIONS  (STANDING):  albumin human  5% IVPB 125 milliLiter(s) IV Intermittent once  ALBUTerol    90 MICROgram(s) HFA Inhaler 2 Puff(s) Inhalation every 6 hours  amiodarone Infusion 0.5 mG/Min (16.667 mL/Hr) IV Continuous <Continuous>  aspirin enteric coated 325 milliGRAM(s) Oral daily  ceFAZolin   IVPB 1000 milliGRAM(s) IV Intermittent every 8 hours  chlorhexidine 0.12% Liquid 5 milliLiter(s) Oral Mucosa every 4 hours  chlorhexidine 0.12% Liquid 15 milliLiter(s) Oral Mucosa every 12 hours  chlorhexidine 4% Liquid 1 Application(s) Topical <User Schedule>  dexmedetomidine Infusion 0.25 MICROgram(s)/kG/Hr (3.575 mL/Hr) IV Continuous <Continuous>  dextrose 50% Injectable 50 milliLiter(s) IV Push every 15 minutes  dextrose 50% Injectable 25 milliLiter(s) IV Push every 15 minutes  docusate sodium 100 milliGRAM(s) Oral three times a day  guaiFENesin  milliGRAM(s) Oral every 12 hours  insulin regular Infusion 1 Unit(s)/Hr (1 mL/Hr) IV Continuous <Continuous>  ipratropium 17 MICROgram(s) HFA Inhaler 2 Puff(s) Inhalation every 6 hours  magnesium sulfate  IVPB 1 Gram(s) IV Intermittent daily  meperidine     Injectable 25 milliGRAM(s) IV Push once  milrinone Infusion 0.5 MICROgram(s)/kG/Min (8.58 mL/Hr) IV Continuous <Continuous>  niCARdipine Infusion 5 mG/Hr (25 mL/Hr) IV Continuous <Continuous>  nitroglycerin  Infusion 5 MICROgram(s)/Min (1.5 mL/Hr) IV Continuous <Continuous>  norepinephrine Infusion 0.05 MICROgram(s)/kG/Min (5.363 mL/Hr) IV Continuous <Continuous>  pantoprazole  Injectable 40 milliGRAM(s) IV Push daily  polyethylene glycol 3350 17 Gram(s) Oral daily  propofol Infusion 10 MICROgram(s)/kG/Min (3.432 mL/Hr) IV Continuous <Continuous>  sodium chloride 0.9%. 1000 milliLiter(s) (10 mL/Hr) IV Continuous <Continuous>  vasopressin Infusion 0.04 Unit(s)/Min (2.4 mL/Hr) IV Continuous <Continuous>    MEDICATIONS  (PRN):  ondansetron  IVPB 4 milliGRAM(s) IV Intermittent once PRN Nausea and/or Vomiting  oxyCODONE    IR 5 milliGRAM(s) Oral every 4 hours PRN Moderate Pain (4 - 6)    HEPARIN:  [x] YES [] NO  Dose: 5000 UNITS Q8H	  SCD's: YES b/l  GI Prophylaxis: Protonix [x], Pepcid [], None [], (Contra-indication:.....)    Post-Op Aspirin: Yes [x],  No [], If No, then contra-indication:  Post-Op Statin: Yes [x], No[], If No, then contra-indication:  Post-Op Beta-Blockers: Yes [], No [x], If No, then contra-indication: Hold BB for bradycardia    Allergies:  No Known Allergies      Ambulation/Activity Status: OOB to chair    Assessment/Plan:  68y Female status-post CABG and LAAC  - Case and plan discussed with CTU Intensivist and CT Surgeon - Dr. Urbano/Flaco/Shawn   - Continue CTU supportive care    - Continue DVT/GI prophylaxis  - Incentive Spirometry 10 times an hour  - Continue to advance physical activity as tolerated and continue PT/OT as directed  1. CAD: Continue ASA, start statin, hold BB for Bradycardia - HR in the 40s off pacemaker  2. DM - cont insulin gtt - will start Lantus/Humalog when patient advances diet  3. Bradycardia - cont to hold BB and d/c amiodarone  4. Low Cardiac output/EF of 20% - cont Primacor at 0.375 mcg/kg/min; maintain swan marianna catheter  5. OOB to chair with swan marianna and tubes in place  6. Follow-up stool guaic  7. If diarrhea persists consider rectal tube placement; consider immodium; as per ID restart PO vancomycin 125mg q6h

## 2019-10-08 NOTE — PHYSICAL THERAPY INITIAL EVALUATION ADULT - PLANNED THERAPY INTERVENTIONS, PT EVAL
gait training/strengthening/transfer training
bed mobility training/gait training/strengthening/transfer training

## 2019-10-08 NOTE — PROGRESS NOTE ADULT - SUBJECTIVE AND OBJECTIVE BOX
cc: dm  no cp  NAD    A/P: DM 2  doing well off insulin drip  agree with lantus 30 units/day  agree with humalog 10 units with meals + correction

## 2019-10-08 NOTE — PHYSICAL THERAPY INITIAL EVALUATION ADULT - IMPAIRMENTS FOUND, PT EVAL
gait, locomotion, and balance/aerobic capacity/endurance/muscle strength/poor safety awareness/posture
aerobic capacity/endurance/muscle strength/posture/gait, locomotion, and balance

## 2019-10-08 NOTE — PHYSICAL THERAPY INITIAL EVALUATION ADULT - PHYSICAL ASSIST/NONPHYSICAL ASSIST: SIT/STAND, REHAB EVAL
nonverbal cues (demo/gestures)/verbal cues/Chair. Cueing for safety, hand placement, CW use, posture./1 person assist

## 2019-10-08 NOTE — PHYSICAL THERAPY INITIAL EVALUATION ADULT - PERTINENT HX OF CURRENT PROBLEM, REHAB EVAL
68F with PMHx of uncontrolled DM (non-compliant), viral myopathy, Paroxysmal Afib, recent labial abscess s/p ID, hip fracture s/p fixation, recent LGIB bleed now presents for shortness of breath.
68F with PMHx of uncontrolled DM (non-compliant), viral myopathy, Paroxysmal Afib, recent labial abscess s/p ID, hip fracture s/p fixation, recent LGIB bleed now presents for shortness of breath.

## 2019-10-08 NOTE — CONSULT NOTE ADULT - SUBJECTIVE AND OBJECTIVE BOX
Gastroenterology/Hepatology Consultation    For questions and inquiries please page [phone].  For urgent matters or after 5pm and on weekends please page the fellow on call through the GI paging system.    68y Female with diarrhea concerns for gi bleed (melena)  Patient is a 68y old  Female who presents with a chief complaint of cdiff (08 Oct 2019 15:30)    HPI:  68F with PMHx of uncontrolled DM (non-compliant), viral myopathy, Paroxysmal Afib, recent labial abscess s/p ID, hip fracture s/p fixation, recent LGIB bleed now presents for shortness of breath. Patient has been having shortness of breath for the last day. Occurs on exertion and stationary however no worsening of breathing when laying down. Patient denied abdominal pain however family reports she has been having some abdominal discomfort over the last day. Patient also has an indwelling montejo for the last 3 weeks for urinary retention. Reports cough with no sputum production. Denies chest pain, fever, nausea/vomiting, diarrhea. (21 Sep 2019 21:52)      GI Hx:  68 F PMHx of DM, viral myopathy, paroxysmal afib admitted in september for cdiff. course of stay complicated by myocardial ischemia sp cabg on 10/7. Earlier in september, she had a colonoscopy after reports of limited hematochezia, colonoscopy was revealing of a rectal ulcer, which in the setting of her known constipation could represent a stercoral ulcer.  Since 48 hours, she has been complaining of watery diarrhea and yesterday had 5 BMs and today 3. Last of which described to contain dark brown fecal material. Today her hgb dropped to 7.1 from 9.2. her BP was low and she was strted on 2 pressors.  of note her wbc and platelets were also noted to be significantly lower.  Denies hematochezia, emesis, hematemesis, abdominal pain currently.      Previous colonoscopy(ies):    Colonoscopy Report Date: 9/4/2019 2:45 PM     change in bm with severe constipation requiring disimpaction  Procedure:       The procedure, indications, preparation and potential complications were  explained to the patient, who indicated understanding and signed the  corresponding consent forms. MAC was administered by the anesthesiologist.  Continuous pulse oximetry and blood pressure monitoring were used throughout the  procedure. Supplemental oxygen was used. The quality of preparation was good.  Patient was placed in leftlateral decubitus position. Digital exam was normal.  The colonoscope was introduced through the rectum and advanced under direct  visualization until cecum was reached. The appendiceal orifice and the  ileo-cecal valve were identified. The terminal ileum was identified. Careful  visualization was performed as the instrument was withdrawn. The colonoscope was  retroflexed within the rectum.  Patient tolerance to procedure was excellent. The procedure was not difficult.     Limitations/Complications: There were no apparent limitations or complications  Findings:   Excavated lesions Multiple non-bleeding diverticula were seen in the sigmoid  colon. Diverticulosis appeared to be of moderate severity.   Protruding lesions Medium grade 3 internal hemorrhoids were noted.   A single sessile 4 mm polyp of benign appearance was found in the rectum. A  single-piece polypectomy was performed using a cold snare. The polyp was  completely removed.   Additional findings large rectal ulcer with oozing - multoiple biopsies taken.     Impressions:    Moderate diverticulosis of the sigmoid colon.    Grade 3 internal hemorrhoids.    Large rectal ulcer with oozing - multoiple biopsies taken.    Polyp (4 mm) in the rectum. (Polypectomy).     Plan: f/u biopsy in 2 weeks stool softners increase liquid diet stop pain meds  repeat flex sig in 3 months if biopsies neg for neoplasia    Barron Solis MD    Version 1, Electronically signed on 9/4/2019 3:55:15 PM Marguerite Solis MD   (09-04-19 @ 14:45)    Previous EGD(s): None    FH: myocardial infarction  FH: stomach cancer      Review of system  General:  (-) weight loss, (-) fevers  Eyes:  (-) visual changes  CV:  (-) chest pain  Resp: (-) SOB, (-) wheezing  GI: (-) abdominal pain,  (-) nausea, (-) vomiting, (-) dysphagia, (+) diarrhea, (-) constipation, (-) rectal bleeding, (?) melena, (-) hematemesis.  Neuro: (-) confusion, (-) weakness  Psych:  (-) Hallucinations  Heme:  (-) easy bruisability    Past medical/surgical Hx:  PAST MEDICAL & SURGICAL HISTORY:  Female bladder prolapse  Diabetes  History of repair of hip fracture    Home Medications:  aspirin 81 mg oral tablet: 1 tab(s) orally once a day      Allergies: No Known Allergies      Current Medications:   albumin human  5% IVPB 125 milliLiter(s) IV Intermittent once  ALBUTerol/ipratropium for Nebulization 3 milliLiter(s) Nebulizer every 6 hours  ALPRAZolam 0.25 milliGRAM(s) Oral once  atorvastatin 40 milliGRAM(s) Oral at bedtime  chlorhexidine 0.12% Liquid 5 milliLiter(s) Oral Mucosa every 4 hours  chlorhexidine 0.12% Liquid 15 milliLiter(s) Oral Mucosa every 12 hours  chlorhexidine 4% Liquid 1 Application(s) Topical <User Schedule>  dextrose 40% Gel 15 Gram(s) Oral once PRN  dextrose 5%. 1000 milliLiter(s) IV Continuous <Continuous>  dextrose 50% Injectable 50 milliLiter(s) IV Push every 15 minutes  dextrose 50% Injectable 25 milliLiter(s) IV Push every 15 minutes  glucagon  Injectable 1 milliGRAM(s) IntraMuscular once PRN  guaiFENesin  milliGRAM(s) Oral every 12 hours  insulin glargine Injectable (LANTUS) 30 Unit(s) SubCutaneous every morning  insulin lispro Injectable (HumaLOG) 10 Unit(s) SubCutaneous three times a day before meals  insulin regular Infusion 1 Unit(s)/Hr IV Continuous <Continuous>  magnesium sulfate  IVPB 1 Gram(s) IV Intermittent daily  milrinone Infusion 0.5 MICROgram(s)/kG/Min IV Continuous <Continuous>  norepinephrine Infusion 0.05 MICROgram(s)/kG/Min IV Continuous <Continuous>  ondansetron  IVPB 4 milliGRAM(s) IV Intermittent once PRN  pantoprazole  Injectable 50 milliGRAM(s) IV Push once  pantoprazole Infusion 8 mG/Hr IV Continuous <Continuous>  sodium chloride 0.9%. 1000 milliLiter(s) IV Continuous <Continuous>  traMADol 25 milliGRAM(s) Oral every 4 hours PRN  vancomycin    Solution 125 milliGRAM(s) Oral every 6 hours  vasopressin Infusion 0.04 Unit(s)/Min IV Continuous <Continuous>      Physical exam:  T(C): 36.2 (10-08-19 @ 22:00), Max: 36.8 (10-08-19 @ 14:00)  HR: 79 (10-08-19 @ 23:15) (79 - 83)  BP: 120/62 (10-08-19 @ 23:00) (91/52 - 123/58)  RR: 18 (10-08-19 @ 21:30) (12 - 29)  SpO2: 95% (10-08-19 @ 23:15) (93% - 100%)  GENERAL: NAD  HEAD:  Atraumatic, Normocephalic  EYES: Sclera: NL  NECK: Supple, no JVD or thyromegaly  CHEST/LUNG: Good bilateral air entry, + bipap  HEART: Regular  ABDOMEN: (-) distended, (-) tender, (-) rebound, (+) BS, (-)HSM  EXTREMITIES: (-) edema  NEUROLOGY: (-) asterixis  SKIN: (-) jaundice  MAKAYLA: dark green stool    Data:                        7.1    12.00 )-----------( 144      ( 08 Oct 2019 17:30 )             22.2     MCV 85.4 (10-08-19)  86.1 (10-08-19)    RDW 15.2 (10-08-19)  14.7 (10-08-19)    HGB trend:  7.1  10-08-19 @ 17:30  9.2  10-08-19 @ 01:30  7.5  10-07-19 @ 14:19  7.7  10-07-19 @ 12:48  7.3  10-07-19 @ 11:23  8.3  10-07-19 @ 02:00      10-07-19 @ 07:01  -  10-08-19 @ 07:00  --------------------------------------------------------  IN: 1778 mL    10-08-19 @ 07:01  -  10-08-19 @ 23:25  --------------------------------------------------------  IN: 1550 mL      10-08    139  |  109  |  44<H>  ----------------------------<  211<H>  4.8   |  19  |  1.1    Ca    8.6      08 Oct 2019 17:30  Mg     3.2     10-08    TPro  5.2<L>  /  Alb  4.1  /  TBili  0.3  /  DBili  x   /  AST  49<H>  /  ALT  24  /  AlkPhos  125<H>  10-08    Liver panel trend:  TBili 0.3   /   AST 49   /   ALT 24   /   AlkP 125   /   Tptn 5.2   /   Alb 4.1    /   DBili --      10-08  TBili 0.2   /   AST 75   /   ALT 39   /   AlkP 90   /   Tptn 5.3   /   Alb 4.0    /   DBili --      10-08  TBili 0.4   /   AST 40   /   ALT 18   /   AlkP 69   /   Tptn 4.7   /   Alb 3.3    /   DBili --      10-07  TBili 0.3   /   AST 17   /   ALT 20   /   AlkP 126   /   Tptn 6.2   /   Alb 3.4    /   DBili --      10-03  TBili 0.2   /   AST 30   /   ALT 25   /   AlkP 126   /   Tptn 6.1   /   Alb 3.4    /   DBili --      10-02  TBili 0.3   /   AST 27   /   ALT 22   /   AlkP 122   /   Tptn 6.1   /   Alb 3.3    /   DBili --      10-01  TBili 0.2   /   AST 25   /   ALT 23   /   AlkP 136   /   Tptn 6.6   /   Alb 3.4    /   DBili --      09-30  TBili 0.3   /   AST 16   /   ALT 22   /   AlkP 142   /   Tptn 6.3   /   Alb 3.2    /   DBili --      09-29        PT/INR - ( 08 Oct 2019 01:30 )   PT: 12.10 sec;   INR: 1.05 ratio         PTT - ( 08 Oct 2019 01:30 )  PTT:33.3 sec    C Diff by PCR Result: Positive (09.25.19 @ 09:43)    Occult Blood, Feces Multiple Specimen: Positive (10.08.19 @ 05:18) Gastroenterology/Hepatology Consultation    For questions and inquiries please page [phone].  For urgent matters or after 5pm and on weekends please page the fellow on call through the GI paging system.    68y Female with diarrhea concerns for gi bleed (melena)  Patient is a 68y old  Female who presents with a chief complaint of cdiff (08 Oct 2019 15:30)    HPI:  68F with PMHx of uncontrolled DM (non-compliant), viral myopathy, Paroxysmal Afib, recent labial abscess s/p ID, hip fracture s/p fixation, recent LGIB bleed now presents for shortness of breath. Patient has been having shortness of breath for the last day. Occurs on exertion and stationary however no worsening of breathing when laying down. Patient denied abdominal pain however family reports she has been having some abdominal discomfort over the last day. Patient also has an indwelling montejo for the last 3 weeks for urinary retention. Reports cough with no sputum production. Denies chest pain, fever, nausea/vomiting, diarrhea. (21 Sep 2019 21:52)      GI Hx:  68 F PMHx of DM, viral myopathy, paroxysmal afib admitted in september for cdiff. course of stay complicated by myocardial ischemia sp cabg on 10/7. Earlier in september, she had a colonoscopy after reports of limited hematochezia, colonoscopy was revealing of a rectal ulcer, biopsies were focally positive for CMV.  Since 48 hours, she has been complaining of watery diarrhea and yesterday had 5 BMs and today 3. Last of which described to contain dark brown fecal material. Today her hgb dropped to 7.1 from 9.2. her BP was low and she was strted on 2 pressors.  of note her wbc and platelets were also noted to be significantly lower.  Denies hematochezia, emesis, hematemesis, abdominal pain currently.      Previous colonoscopy(ies):    Colonoscopy Report Date: 9/4/2019 2:45 PM     change in bm with severe constipation requiring disimpaction  Procedure:       The procedure, indications, preparation and potential complications were  explained to the patient, who indicated understanding and signed the  corresponding consent forms. MAC was administered by the anesthesiologist.  Continuous pulse oximetry and blood pressure monitoring were used throughout the  procedure. Supplemental oxygen was used. The quality of preparation was good.  Patient was placed in leftlateral decubitus position. Digital exam was normal.  The colonoscope was introduced through the rectum and advanced under direct  visualization until cecum was reached. The appendiceal orifice and the  ileo-cecal valve were identified. The terminal ileum was identified. Careful  visualization was performed as the instrument was withdrawn. The colonoscope was  retroflexed within the rectum.  Patient tolerance to procedure was excellent. The procedure was not difficult.     Limitations/Complications: There were no apparent limitations or complications  Findings:   Excavated lesions Multiple non-bleeding diverticula were seen in the sigmoid  colon. Diverticulosis appeared to be of moderate severity.   Protruding lesions Medium grade 3 internal hemorrhoids were noted.   A single sessile 4 mm polyp of benign appearance was found in the rectum. A  single-piece polypectomy was performed using a cold snare. The polyp was  completely removed.   Additional findings large rectal ulcer with oozing - multoiple biopsies taken.     Impressions:    Moderate diverticulosis of the sigmoid colon.    Grade 3 internal hemorrhoids.    Large rectal ulcer with oozing - multoiple biopsies taken.    Polyp (4 mm) in the rectum. (Polypectomy).     Plan: f/u biopsy in 2 weeks stool softners increase liquid diet stop pain meds  repeat flex sig in 3 months if biopsies neg for neoplasia    Barron Solis MD    Version 1, Electronically signed on 9/4/2019 3:55:15 PM Marguerite Solis MD   (09-04-19 @ 14:45)    Previous EGD(s): None    FH: myocardial infarction  FH: stomach cancer      Review of system  General:  (-) weight loss, (-) fevers  Eyes:  (-) visual changes  CV:  (-) chest pain  Resp: (-) SOB, (-) wheezing  GI: (-) abdominal pain,  (-) nausea, (-) vomiting, (-) dysphagia, (+) diarrhea, (-) constipation, (-) rectal bleeding, (?) melena, (-) hematemesis.  Neuro: (-) confusion, (-) weakness  Psych:  (-) Hallucinations  Heme:  (-) easy bruisability    Past medical/surgical Hx:  PAST MEDICAL & SURGICAL HISTORY:  Female bladder prolapse  Diabetes  History of repair of hip fracture    Home Medications:  aspirin 81 mg oral tablet: 1 tab(s) orally once a day      Allergies: No Known Allergies      Current Medications:   albumin human  5% IVPB 125 milliLiter(s) IV Intermittent once  ALBUTerol/ipratropium for Nebulization 3 milliLiter(s) Nebulizer every 6 hours  ALPRAZolam 0.25 milliGRAM(s) Oral once  atorvastatin 40 milliGRAM(s) Oral at bedtime  chlorhexidine 0.12% Liquid 5 milliLiter(s) Oral Mucosa every 4 hours  chlorhexidine 0.12% Liquid 15 milliLiter(s) Oral Mucosa every 12 hours  chlorhexidine 4% Liquid 1 Application(s) Topical <User Schedule>  dextrose 40% Gel 15 Gram(s) Oral once PRN  dextrose 5%. 1000 milliLiter(s) IV Continuous <Continuous>  dextrose 50% Injectable 50 milliLiter(s) IV Push every 15 minutes  dextrose 50% Injectable 25 milliLiter(s) IV Push every 15 minutes  glucagon  Injectable 1 milliGRAM(s) IntraMuscular once PRN  guaiFENesin  milliGRAM(s) Oral every 12 hours  insulin glargine Injectable (LANTUS) 30 Unit(s) SubCutaneous every morning  insulin lispro Injectable (HumaLOG) 10 Unit(s) SubCutaneous three times a day before meals  insulin regular Infusion 1 Unit(s)/Hr IV Continuous <Continuous>  magnesium sulfate  IVPB 1 Gram(s) IV Intermittent daily  milrinone Infusion 0.5 MICROgram(s)/kG/Min IV Continuous <Continuous>  norepinephrine Infusion 0.05 MICROgram(s)/kG/Min IV Continuous <Continuous>  ondansetron  IVPB 4 milliGRAM(s) IV Intermittent once PRN  pantoprazole  Injectable 50 milliGRAM(s) IV Push once  pantoprazole Infusion 8 mG/Hr IV Continuous <Continuous>  sodium chloride 0.9%. 1000 milliLiter(s) IV Continuous <Continuous>  traMADol 25 milliGRAM(s) Oral every 4 hours PRN  vancomycin    Solution 125 milliGRAM(s) Oral every 6 hours  vasopressin Infusion 0.04 Unit(s)/Min IV Continuous <Continuous>      Physical exam:  T(C): 36.2 (10-08-19 @ 22:00), Max: 36.8 (10-08-19 @ 14:00)  HR: 79 (10-08-19 @ 23:15) (79 - 83)  BP: 120/62 (10-08-19 @ 23:00) (91/52 - 123/58)  RR: 18 (10-08-19 @ 21:30) (12 - 29)  SpO2: 95% (10-08-19 @ 23:15) (93% - 100%)  GENERAL: NAD  HEAD:  Atraumatic, Normocephalic  EYES: Sclera: NL  NECK: Supple, no JVD or thyromegaly  CHEST/LUNG: Good bilateral air entry, + bipap  HEART: Regular  ABDOMEN: (-) distended, (-) tender, (-) rebound, (+) BS, (-)HSM  EXTREMITIES: (-) edema  NEUROLOGY: (-) asterixis  SKIN: (-) jaundice  MAKAYLA: dark green stool    Data:                        7.1    12.00 )-----------( 144      ( 08 Oct 2019 17:30 )             22.2     MCV 85.4 (10-08-19)  86.1 (10-08-19)    RDW 15.2 (10-08-19)  14.7 (10-08-19)    HGB trend:  7.1  10-08-19 @ 17:30  9.2  10-08-19 @ 01:30  7.5  10-07-19 @ 14:19  7.7  10-07-19 @ 12:48  7.3  10-07-19 @ 11:23  8.3  10-07-19 @ 02:00      10-07-19 @ 07:01  -  10-08-19 @ 07:00  --------------------------------------------------------  IN: 1778 mL    10-08-19 @ 07:01  -  10-08-19 @ 23:25  --------------------------------------------------------  IN: 1550 mL      10-08    139  |  109  |  44<H>  ----------------------------<  211<H>  4.8   |  19  |  1.1    Ca    8.6      08 Oct 2019 17:30  Mg     3.2     10-08    TPro  5.2<L>  /  Alb  4.1  /  TBili  0.3  /  DBili  x   /  AST  49<H>  /  ALT  24  /  AlkPhos  125<H>  10-08    Liver panel trend:  TBili 0.3   /   AST 49   /   ALT 24   /   AlkP 125   /   Tptn 5.2   /   Alb 4.1    /   DBili --      10-08  TBili 0.2   /   AST 75   /   ALT 39   /   AlkP 90   /   Tptn 5.3   /   Alb 4.0    /   DBili --      10-08  TBili 0.4   /   AST 40   /   ALT 18   /   AlkP 69   /   Tptn 4.7   /   Alb 3.3    /   DBili --      10-07  TBili 0.3   /   AST 17   /   ALT 20   /   AlkP 126   /   Tptn 6.2   /   Alb 3.4    /   DBili --      10-03  TBili 0.2   /   AST 30   /   ALT 25   /   AlkP 126   /   Tptn 6.1   /   Alb 3.4    /   DBili --      10-02  TBili 0.3   /   AST 27   /   ALT 22   /   AlkP 122   /   Tptn 6.1   /   Alb 3.3    /   DBili --      10-01  TBili 0.2   /   AST 25   /   ALT 23   /   AlkP 136   /   Tptn 6.6   /   Alb 3.4    /   DBili --      09-30  TBili 0.3   /   AST 16   /   ALT 22   /   AlkP 142   /   Tptn 6.3   /   Alb 3.2    /   DBili --      09-29        PT/INR - ( 08 Oct 2019 01:30 )   PT: 12.10 sec;   INR: 1.05 ratio         PTT - ( 08 Oct 2019 01:30 )  PTT:33.3 sec    C Diff by PCR Result: Positive (09.25.19 @ 09:43)    Occult Blood, Feces Multiple Specimen: Positive (10.08.19 @ 05:18)

## 2019-10-08 NOTE — PROGRESS NOTE ADULT - SUBJECTIVE AND OBJECTIVE BOX
GUS WILBURN  68y, Female  Allergy: No Known Allergies      CHIEF COMPLAINT: HFrEF (07 Oct 2019 08:42)      INTERVAL EVENTS/HPI  - ID follow-up:  68F uncontrolled DM (non-compliant), viral myopathy, Paroxysmal Afib, recent labial abscess s/p ID cx with candida, hip fracture s/p fixation, recent LGIB, chronic montejo, initially admitted with hypoxemic resp failure 2/2 HF s/p intubation/extubation, course complicated by Cdiff infection 9/25, now s/p CABG 10/7  Post CABG pt was on steroids, also given Kayexalate and developed diarrhea. Denies abd pain. Diarrhea is watery, no foul smell.   - T(F): , Max: 98.4 (10-07-19 @ 21:00)  - WBC Count: 20.04 (10-08-19 @ 01:30) <--WBC Count: 15.96 (10-07-19 @ 14:19)  - Creatinine, Serum: 1.0 (10-08-19 @ 01:30)      ROS  General: Denies rigors, nightsweats  HEENT: Denies headache, rhinorrhea, sore throat, eye pain  CV: Denies CP, palpitations  PULM: Denies SOB, wheezing  GI: Denies hematemesis, hematochezia, melena  : Denies discharge, hematuria  MSK: Denies arthralgias, myalgias  SKIN: Denies rash, lesions  NEURO: Denies paresthesias, weakness  PSYCH: Denies depression, anxiety    VITALS:  T(F): 98.2, Max: 98.4 (10-07-19 @ 21:00)  HR: 79  BP: 96/54  RR: 18Vital Signs Last 24 Hrs  T(C): 36.8 (08 Oct 2019 14:00), Max: 36.9 (07 Oct 2019 21:00)  T(F): 98.2 (08 Oct 2019 14:00), Max: 98.4 (07 Oct 2019 21:00)  HR: 79 (08 Oct 2019 14:30) (75 - 115)  BP: 96/54 (08 Oct 2019 08:00) (89/53 - 114/72)  BP(mean): 69 (08 Oct 2019 08:00) (67 - 89)  RR: 18 (08 Oct 2019 14:30) (12 - 41)  SpO2: 95% (08 Oct 2019 14:30) (94% - 100%)    PHYSICAL EXAM:  Gen: NAD, resting in bed  HEENT: Normocephalic, atraumatic  Neck: supple, no lymphadenopathy  CV: Regular rate & regular rhythm, midline incision  Lungs: decreased BS at bases, no fremitus  Abdomen: Soft, BS present  Ext: Warm, well perfused  Neuro: non focal, awake  Skin: no rash, no erythema  Lines: no phlebitis  montejo    FH: Non-contributory  Social Hx: Non-contributory    TESTS & MEASUREMENTS:                        9.2    20.04 )-----------( 214      ( 08 Oct 2019 01:30 )             29.0     10-08    141  |  110  |  41<H>  ----------------------------<  162<H>  4.5   |  20  |  1.0    Ca    8.6      08 Oct 2019 01:30  Mg     3.2     10-08    TPro  5.3<L>  /  Alb  4.0  /  TBili  0.2  /  DBili  x   /  AST  75<H>  /  ALT  39  /  AlkPhos  90  10-08    eGFR if Non African American: 59 mL/min/1.73M2 (10-08-19 @ 01:30)  eGFR if African American: 68 mL/min/1.73M2 (10-08-19 @ 01:30)    LIVER FUNCTIONS - ( 08 Oct 2019 01:30 )  Alb: 4.0 g/dL / Pro: 5.3 g/dL / ALK PHOS: 90 U/L / ALT: 39 U/L / AST: 75 U/L / GGT: x                     INFECTIOUS DISEASES TESTING  MRSA PCR Result.: Negative (10-06-19 @ 20:00)      RADIOLOGY & ADDITIONAL TESTS:  I have personally reviewed the last Chest xray  CXR      CT      CARDIOLOGY TESTING  12 Lead ECG:   Ventricular Rate 46 BPM    Atrial Rate 46 BPM    P-R Interval 192 ms    QRS Duration 126 ms    Q-T Interval 424 ms    QTC Calculation(Bezet) 371 ms    P Axis 76 degrees    R Axis -43 degrees    T Axis -4 degrees    Diagnosis Line Sinus bradycardia  Left axis deviation  Left anterior fascicular block  Non-specific intra-ventricular conduction block  Cannot rule out Septal infarct , age undetermined  Possible Lateral infarct , age undetermined  Abnormal ECG    Confirmed by ZAHRAA ALEXANDER MD ([id number]) on 10/8/2019 11:30:17 AM (10-08-19 @ 06:34)  12 Lead ECG:   Ventricular Rate 97 BPM    Atrial Rate 125 BPM    QRS Duration 128 ms    Q-T Interval 374 ms    QTC Calculation(Bezet) 474 ms    P Axis 75 degrees    R Axis -53 degrees    T Axis -85 degrees    Diagnosis Line Sinus tachycardia with 2nd degree A-V block (Mobitz I)  Non-specific intra-ventricular conduction block  Lateral infarct , age undetermined  T wave abnormality, consider inferior ischemia  Abnormal ECG    Confirmed by ZAHRAA ALEXANDER MD ([id number]) on 10/8/2019 11:37:07 AM (10-07-19 @ 15:59)      MEDICATIONS  albumin human  5% IVPB 125  albumin human  5% IVPB 500  ALBUTerol/ipratropium for Nebulization 3  aspirin enteric coated 325  atorvastatin 40  chlorhexidine 0.12% Liquid 5  chlorhexidine 0.12% Liquid 15  chlorhexidine 4% Liquid 1  dextrose 5%. 1000  dextrose 50% Injectable 50  dextrose 50% Injectable 25  guaiFENesin   insulin glargine Injectable (LANTUS) 30  insulin lispro Injectable (HumaLOG) 10  insulin regular Infusion 1  magnesium sulfate  IVPB 1  milrinone Infusion 0.5  pantoprazole    Tablet 40  sodium chloride 0.9%. 1000  vancomycin    Solution 125  vasopressin Infusion 0.04      ANTIBIOTICS:  vancomycin    Solution 125 milliGRAM(s) Oral every 6 hours      All available historical records have been reviewed

## 2019-10-08 NOTE — PROGRESS NOTE ADULT - ASSESSMENT
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 Denies abd pain. Diarrhea is watery, no foul smell.     IMPRESSION  #Diarrhea, lower suspicion for recurrent Cdiff as recent kayexalate, no abd pain, no foul smell.     +Leukocytosis could be stress related 2/2 OR and recent steroids    PO vanc ended 10/5    RECOMMENDATIONS  - OK to cont PO vanc 125 mg q6h   - Can check stool WBC  - No utility in checking repeat Cdiff testing  - Trend WBC    Spectra [id number]

## 2019-10-08 NOTE — PROGRESS NOTE ADULT - SUBJECTIVE AND OBJECTIVE BOX
CTU Attending Progress Daily Note     08 Oct 2019 08:34    Procedure:                                                  POD#                   Patient seen as post-op critical care follow-up    HPI:  68F with PMHx of uncontrolled DM (non-compliant), viral myopathy, Paroxysmal Afib, recent labial abscess s/p ID, hip fracture s/p fixation, recent LGIB bleed now presents for shortness of breath. Patient has been having shortness of breath for the last day. Occurs on exertion and stationary however no worsening of breathing when laying down. Patient denied abdominal pain however family reports she has been having some abdominal discomfort over the last day. Patient also has an indwelling montejo for the last 3 weeks for urinary retention. Reports cough with no sputum production. Denies chest pain, fever, nausea/vomiting, diarrhea. (21 Sep 2019 21:52)    See preop testing chart H&P    Interval event for past 24 hr:  GUS WILBURN  68y had no event.     Current Complains:  GUS WILBURN has no new complaints    REVIEW OF SYSTEMS:  CONSTITUTIONAL:  [-] weakness, [-] fevers, [-] chills  EYES/ENT: [-] visual changes, [-] vertigo, [-] throat pain   NECK: [-] pain, [-] stiffness  RESPIRATORY: [-] cough, [-] wheezing, [-] hemoptysis, [-] shortness of breath  CARDIOVASCULAR: [-] chest pain, [-] palpitations, [-] orthopnea  GASTROINTESTINAL:    [-]abdominal pain, [-] nausea, [-] vomiting, [-] hematemesis, [-] diarrhea, [-] constipation, [-] melena, [-] hematochezia.  GENITOURINARY: [-] dysuria, [-] frequency, [-] hematuria  NEUROLOGICAL: [-] numbness, [-] weakness  SKIN: [-] itching, [-] burning, [-] rashes, [-] lesions   All other review of systems is negative unless indicated above.    [  ] Unable to assess ROS because :    OBJECTIVE:  ICU Vital Signs Last 24 Hrs  T(C): 36.4 (08 Oct 2019 08:00), Max: 36.9 (07 Oct 2019 21:00)  T(F): 97.5 (08 Oct 2019 08:00), Max: 98.4 (07 Oct 2019 21:00)  HR: 79 (08 Oct 2019 08:00) (75 - 115)  BP: 96/54 (08 Oct 2019 08:00) (83/52 - 142/71)  BP(mean): 69 (08 Oct 2019 08:00) (62 - 97)  ABP: 110/52 (08 Oct 2019 08:00) (82/41 - 153/64)  ABP(mean): 69 (08 Oct 2019 08:00) (54 - 132)  RR: 22 (08 Oct 2019 08:00) (12 - 41)  SpO2: 97% (08 Oct 2019 08:00) (94% - 100%)      I&O's Summary    07 Oct 2019 07:01  -  08 Oct 2019 07:00  --------------------------------------------------------  IN: 3067.9 mL / OUT: 2415 mL / NET: 652.8 mL    08 Oct 2019 07:01  -  08 Oct 2019 08:34  --------------------------------------------------------  IN: 146.4 mL / OUT: 30 mL / NET: 116.4 mL      Adult Advanced Hemodynamics Last 24 Hrs  CVP(mm Hg): 11 (08 Oct 2019 08:00) (0 - 223)  CVP(cm H2O): --  CO: 4.2 (08 Oct 2019 08:00) (2.8 - 4.6)  CI: 2.6 (08 Oct 2019 08:00) (1.7 - 2.8)  PA: 43/21 (08 Oct 2019 08:00) (19/11 - 56/27)  PA(mean): 30 (08 Oct 2019 08:00) (14 - 39)  PCWP: --  SVR: 1094 (08 Oct 2019 08:00) (-162 - 5792)  SVRI: 1769 (08 Oct 2019 03:00) (-4252 - 7394)  PVR: --  PVRI: --  Mode: CPAP with PS  FiO2: 40  PEEP: 5  PS: 7      PHYSICAL EXAM:  General: WN/WD NAD    HEENT:     [+] NCAT  [+] EOMI  [-] Conjuctival edema   [-] Icterus   [-] Thrush   [-] ETT  [-] NGT/OGT    Neck:         [+] FROM   [-] JVD     [-] Nodes     [-] Masses    [+] Mid-line trachea    [-] Tracheostomy    Chest:         [-] Sternal click   [-] Sternal drainage   [+] Pacing wires   [+] Chest tubes   [-] SubQ emphysema    Lungs:          [+] CTA   [-] Rhonchi   [-] Rales    [-] Wheezing    [-] Decreased BS    [-] Dullness R L    Cardiac:       [+] S1 [+] S2    [+] RRR   [-] Irregular   [-] S3   [-] S4    [-] Murmurs    [-] Rub    Abdomen:    [+] BS    [+] Soft    [+] Non-tender     [-] Distended    [-] Organomegaly  [-] PEG    Extremities:   [-] Cyanosis U/L   [-] Clubbing  U/L  [-] LE/UE Edema   [+] Capillary refill    [+] Pulses     Neuro:        [+] Awake   [+]  Alert   [-] Confused   [-] Lethargic   [-] Sedated   [-] Generalized Weakness    Skin:        [-] Rashes    [-] Erythema   [+] Normal incisions   [+] IV sites intact   [-] Sacral decubitus    Tubes:  LINES:    CAPILLARY BLOOD GLUCOSE  173 (08 Oct 2019 01:00)      POCT Blood Glucose.: 105 mg/dL (08 Oct 2019 06:50)    CAPILLARY BLOOD GLUCOSE  173 (08 Oct 2019 01:00)  210 (07 Oct 2019 23:00)  219 (07 Oct 2019 22:30)  229 (07 Oct 2019 21:30)  232 (07 Oct 2019 20:00)      POCT Blood Glucose.: 105 mg/dL (08 Oct 2019 06:50)  POCT Blood Glucose.: 127 mg/dL (08 Oct 2019 02:57)  POCT Blood Glucose.: 161 mg/dL (08 Oct 2019 01:33)  POCT Blood Glucose.: 183 mg/dL (08 Oct 2019 00:18)  POCT Blood Glucose.: 85 mg/dL (07 Oct 2019 16:11)      HOSPITAL MEDICATIONS:  MEDICATIONS  (STANDING):  albumin human  5% IVPB 125 milliLiter(s) IV Intermittent once  ALBUTerol    90 MICROgram(s) HFA Inhaler 2 Puff(s) Inhalation every 6 hours  amiodarone Infusion 0.5 mG/Min (16.667 mL/Hr) IV Continuous <Continuous>  aspirin enteric coated 325 milliGRAM(s) Oral daily  ceFAZolin   IVPB 1000 milliGRAM(s) IV Intermittent every 8 hours  chlorhexidine 0.12% Liquid 5 milliLiter(s) Oral Mucosa every 4 hours  chlorhexidine 0.12% Liquid 15 milliLiter(s) Oral Mucosa every 12 hours  chlorhexidine 4% Liquid 1 Application(s) Topical <User Schedule>  dexmedetomidine Infusion 0.25 MICROgram(s)/kG/Hr (3.575 mL/Hr) IV Continuous <Continuous>  dextrose 50% Injectable 50 milliLiter(s) IV Push every 15 minutes  dextrose 50% Injectable 25 milliLiter(s) IV Push every 15 minutes  docusate sodium 100 milliGRAM(s) Oral three times a day  guaiFENesin  milliGRAM(s) Oral every 12 hours  insulin regular Infusion 1 Unit(s)/Hr (1 mL/Hr) IV Continuous <Continuous>  ipratropium 17 MICROgram(s) HFA Inhaler 2 Puff(s) Inhalation every 6 hours  magnesium sulfate  IVPB 1 Gram(s) IV Intermittent daily  meperidine     Injectable 25 milliGRAM(s) IV Push once  milrinone Infusion 0.5 MICROgram(s)/kG/Min (8.58 mL/Hr) IV Continuous <Continuous>  niCARdipine Infusion 5 mG/Hr (25 mL/Hr) IV Continuous <Continuous>  nitroglycerin  Infusion 5 MICROgram(s)/Min (1.5 mL/Hr) IV Continuous <Continuous>  norepinephrine Infusion 0.05 MICROgram(s)/kG/Min (5.363 mL/Hr) IV Continuous <Continuous>  pantoprazole  Injectable 40 milliGRAM(s) IV Push daily  polyethylene glycol 3350 17 Gram(s) Oral daily  propofol Infusion 10 MICROgram(s)/kG/Min (3.432 mL/Hr) IV Continuous <Continuous>  sodium chloride 0.9%. 1000 milliLiter(s) (10 mL/Hr) IV Continuous <Continuous>  vasopressin Infusion 0.04 Unit(s)/Min (2.4 mL/Hr) IV Continuous <Continuous>    MEDICATIONS  (PRN):  ondansetron  IVPB 4 milliGRAM(s) IV Intermittent once PRN Nausea and/or Vomiting  oxyCODONE    IR 5 milliGRAM(s) Oral every 4 hours PRN Moderate Pain (4 - 6)      LABS:  ABG - ( 08 Oct 2019 05:34 )  pH, Arterial: 7.35  pH, Blood: x     /  pCO2: 38    /  pO2: 120   / HCO3: 21    / Base Excess: -3.8  /  SaO2: 99                                      9.2    20.04 )-----------( 214      ( 08 Oct 2019 01:30 )             29.0     10-08    141  |  110  |  41<H>  ----------------------------<  162<H>  4.5   |  20  |  1.0    Ca    8.6      08 Oct 2019 01:30  Mg     3.2     10-08    TPro  5.3<L>  /  Alb  4.0  /  TBili  0.2  /  DBili  x   /  AST  75<H>  /  ALT  39  /  AlkPhos  90  10-08    PT/INR - ( 08 Oct 2019 01:30 )   PT: 12.10 sec;   INR: 1.05 ratio         PTT - ( 08 Oct 2019 01:30 )  PTT:33.3 sec        RADIOLOGY:  Reviewed and interpreted by me  CXR from 10-08-19 shows [+] mild congestion, [-] pneumothorax, [-] R/L effusion, [-] cardiomegaly,   NGT in place, S-G Catheter in place, R/L TLC in place, R/L Chest Tubes in place    ECG:  Reviewed and interpreted by me:   QTC:    Assessment:      PAST MEDICAL & SURGICAL HISTORY:  Female bladder prolapse  Diabetes  History of repair of hip fracture      PLAN:  Neuro: Pain control  Pulm: Encourage coughing, deep breathing and use of incentive spirometry. Wean off supplemental oxygen as able. Daily CXR.   Cardio: Monitor telemetry/alarms. Continue cardiac meds  GI: Tolerating diet. Continue stool softeners. Continue GI prophylaxis  Renal: monitor urine output, supplement electrolytes as needed  Vasc: Heparin SC/SCDs for DVT prophylaxis  Heme: Monitor H/H.   ID: Off antibiotics. Stable.  Endocrine: Monitor finger stick blood sugar and control hyperglycemia with insulin  Physical Therapy: OOB/ambulate  Tubes: Monitor Chest tube output      Discussed with Cardiothoracic Team at AM rounds.    45 minutes of critical care time spent providing medical care for patient's acute illness/conditions that impairs at least one vital organ system and/or poses a high risk of imminent or life threatening deterioration in the patient's condition. It includes time spent evaluating and treating the patient's acute illness as well as time spent reviewing labs, radiology, discussing goals of care with patient and/or patient's family, and discussing the case with a multidisciplinary team in an effort to prevent further life threatening deterioration or end organ damage. This time is independent of any procedures performed. CTU Attending Progress Daily Note     08 Oct 2019 08:34    Procedure:                  CABG                                POD#    1               Patient seen as post-op critical care follow-up    HPI:  68F with PMHx of uncontrolled DM (non-compliant), viral myopathy, Paroxysmal Afib, recent labial abscess s/p ID, hip fracture s/p fixation, recent LGIB bleed now presents for shortness of breath. Patient has been having shortness of breath for the last day. Occurs on exertion and stationary however no worsening of breathing when laying down. Patient denied abdominal pain however family reports she has been having some abdominal discomfort over the last day. Patient also has an indwelling montejo for the last 3 weeks for urinary retention. Reports cough with no sputum production. Denies chest pain, fever, nausea/vomiting, diarrhea. (21 Sep 2019 21:52)    See preop testing chart H&P    Interval event for past 24 hr:  GUS WILBURN  68y had hypercapnia on BIPAP    Current Complains:  GUS WILBURN has no new complaints    REVIEW OF SYSTEMS:  CONSTITUTIONAL:  [-] weakness, [-] fevers, [-] chills  EYES/ENT: [-] visual changes, [-] vertigo, [-] throat pain   NECK: [-] pain, [-] stiffness  RESPIRATORY: [-] cough, [-] wheezing, [-] hemoptysis, [-] shortness of breath  CARDIOVASCULAR: [-] chest pain, [-] palpitations, [-] orthopnea  GASTROINTESTINAL:    [-]abdominal pain, [-] nausea, [-] vomiting, [-] hematemesis, [-] diarrhea, [-] constipation, [-] melena, [-] hematochezia.  GENITOURINARY: [-] dysuria, [-] frequency, [-] hematuria  NEUROLOGICAL: [-] numbness, [-] weakness  SKIN: [-] itching, [-] burning, [-] rashes, [-] lesions   All other review of systems is negative unless indicated above.    [  ] Unable to assess ROS because :    OBJECTIVE:  ICU Vital Signs Last 24 Hrs  T(C): 36.4 (08 Oct 2019 08:00), Max: 36.9 (07 Oct 2019 21:00)  T(F): 97.5 (08 Oct 2019 08:00), Max: 98.4 (07 Oct 2019 21:00)  HR: 79 (08 Oct 2019 08:00) (75 - 115)  BP: 96/54 (08 Oct 2019 08:00) (83/52 - 142/71)  BP(mean): 69 (08 Oct 2019 08:00) (62 - 97)  ABP: 110/52 (08 Oct 2019 08:00) (82/41 - 153/64)  ABP(mean): 69 (08 Oct 2019 08:00) (54 - 132)  RR: 22 (08 Oct 2019 08:00) (12 - 41)  SpO2: 97% (08 Oct 2019 08:00) (94% - 100%)      I&O's Summary    07 Oct 2019 07:01  -  08 Oct 2019 07:00  --------------------------------------------------------  IN: 3067.9 mL / OUT: 2415 mL / NET: 652.8 mL    08 Oct 2019 07:01  -  08 Oct 2019 08:34  --------------------------------------------------------  IN: 146.4 mL / OUT: 30 mL / NET: 116.4 mL      Adult Advanced Hemodynamics Last 24 Hrs  CVP(mm Hg): 11 (08 Oct 2019 08:00) (0 - 223)  CVP(cm H2O): --  CO: 4.2 (08 Oct 2019 08:00) (2.8 - 4.6)  CI: 2.6 (08 Oct 2019 08:00) (1.7 - 2.8)  PA: 43/21 (08 Oct 2019 08:00) (19/11 - 56/27)  PA(mean): 30 (08 Oct 2019 08:00) (14 - 39)  PCWP: --  SVR: 1094 (08 Oct 2019 08:00) (-767 - 9609)  SVRI: 1769 (08 Oct 2019 03:00) (-5536 - 8458)  PVR: --  PVRI: --  Mode: CPAP with PS  FiO2: 40  PEEP: 5  PS: 7      PHYSICAL EXAM:  General: WN/WD NAD    HEENT:     [+] NCAT  [+] EOMI  [-] Conjuctival edema   [-] Icterus   [-] Thrush   [-] ETT  [-] NGT/OGT    Neck:         [+] FROM   [-] JVD     [-] Nodes     [-] Masses    [+] Mid-line trachea    [-] Tracheostomy    Chest:         [-] Sternal click   [-] Sternal drainage   [+] Pacing wires   [+] Chest tubes   [-] SubQ emphysema    Lungs:          [+] CTA   [-] Rhonchi   [-] Rales    [-] Wheezing    [-] Decreased BS    [-] Dullness R L    Cardiac:       [+] S1 [+] S2    [+] RRR   [-] Irregular   [-] S3   [-] S4    [-] Murmurs    [-] Rub    Abdomen:    [+] BS    [+] Soft    [+] Non-tender     [-] Distended    [-] Organomegaly  [-] PEG    Extremities:   [-] Cyanosis U/L   [-] Clubbing  U/L  [-] LE/UE Edema   [+] Capillary refill    [+] Pulses     Neuro:        [+] Awake   [+]  Alert   [-] Confused   [-] Lethargic   [-] Sedated   [-] Generalized Weakness    Skin:        [-] Rashes    [-] Erythema   [+] Normal incisions   [+] IV sites intact   [-] Sacral decubitus    Tubes: +  LINES: +    CAPILLARY BLOOD GLUCOSE  173 (08 Oct 2019 01:00)      POCT Blood Glucose.: 105 mg/dL (08 Oct 2019 06:50)    CAPILLARY BLOOD GLUCOSE  173 (08 Oct 2019 01:00)  210 (07 Oct 2019 23:00)  219 (07 Oct 2019 22:30)  229 (07 Oct 2019 21:30)  232 (07 Oct 2019 20:00)      POCT Blood Glucose.: 105 mg/dL (08 Oct 2019 06:50)  POCT Blood Glucose.: 127 mg/dL (08 Oct 2019 02:57)  POCT Blood Glucose.: 161 mg/dL (08 Oct 2019 01:33)  POCT Blood Glucose.: 183 mg/dL (08 Oct 2019 00:18)  POCT Blood Glucose.: 85 mg/dL (07 Oct 2019 16:11)      HOSPITAL MEDICATIONS:  MEDICATIONS  (STANDING):  albumin human  5% IVPB 125 milliLiter(s) IV Intermittent once  ALBUTerol    90 MICROgram(s) HFA Inhaler 2 Puff(s) Inhalation every 6 hours  amiodarone Infusion 0.5 mG/Min (16.667 mL/Hr) IV Continuous <Continuous>  aspirin enteric coated 325 milliGRAM(s) Oral daily  ceFAZolin   IVPB 1000 milliGRAM(s) IV Intermittent every 8 hours  chlorhexidine 0.12% Liquid 5 milliLiter(s) Oral Mucosa every 4 hours  chlorhexidine 0.12% Liquid 15 milliLiter(s) Oral Mucosa every 12 hours  chlorhexidine 4% Liquid 1 Application(s) Topical <User Schedule>  dexmedetomidine Infusion 0.25 MICROgram(s)/kG/Hr (3.575 mL/Hr) IV Continuous <Continuous>  dextrose 50% Injectable 50 milliLiter(s) IV Push every 15 minutes  dextrose 50% Injectable 25 milliLiter(s) IV Push every 15 minutes  docusate sodium 100 milliGRAM(s) Oral three times a day  guaiFENesin  milliGRAM(s) Oral every 12 hours  insulin regular Infusion 1 Unit(s)/Hr (1 mL/Hr) IV Continuous <Continuous>  ipratropium 17 MICROgram(s) HFA Inhaler 2 Puff(s) Inhalation every 6 hours  magnesium sulfate  IVPB 1 Gram(s) IV Intermittent daily  meperidine     Injectable 25 milliGRAM(s) IV Push once  milrinone Infusion 0.5 MICROgram(s)/kG/Min (8.58 mL/Hr) IV Continuous <Continuous>  niCARdipine Infusion 5 mG/Hr (25 mL/Hr) IV Continuous <Continuous>  nitroglycerin  Infusion 5 MICROgram(s)/Min (1.5 mL/Hr) IV Continuous <Continuous>  norepinephrine Infusion 0.05 MICROgram(s)/kG/Min (5.363 mL/Hr) IV Continuous <Continuous>  pantoprazole  Injectable 40 milliGRAM(s) IV Push daily  polyethylene glycol 3350 17 Gram(s) Oral daily  propofol Infusion 10 MICROgram(s)/kG/Min (3.432 mL/Hr) IV Continuous <Continuous>  sodium chloride 0.9%. 1000 milliLiter(s) (10 mL/Hr) IV Continuous <Continuous>  vasopressin Infusion 0.04 Unit(s)/Min (2.4 mL/Hr) IV Continuous <Continuous>    MEDICATIONS  (PRN):  ondansetron  IVPB 4 milliGRAM(s) IV Intermittent once PRN Nausea and/or Vomiting  oxyCODONE    IR 5 milliGRAM(s) Oral every 4 hours PRN Moderate Pain (4 - 6)      LABS:  ABG - ( 08 Oct 2019 05:34 )  pH, Arterial: 7.35  pH, Blood: x     /  pCO2: 38    /  pO2: 120   / HCO3: 21    / Base Excess: -3.8  /  SaO2: 99                                      9.2    20.04 )-----------( 214      ( 08 Oct 2019 01:30 )             29.0     10-08    141  |  110  |  41<H>  ----------------------------<  162<H>  4.5   |  20  |  1.0    Ca    8.6      08 Oct 2019 01:30  Mg     3.2     10-08    TPro  5.3<L>  /  Alb  4.0  /  TBili  0.2  /  DBili  x   /  AST  75<H>  /  ALT  39  /  AlkPhos  90  10-08    PT/INR - ( 08 Oct 2019 01:30 )   PT: 12.10 sec;   INR: 1.05 ratio         PTT - ( 08 Oct 2019 01:30 )  PTT:33.3 sec        RADIOLOGY:  Reviewed and interpreted by me  CXR from 10-08-19 shows [+] mild congestion, [-] pneumothorax, [-] R/L effusion, [-] cardiomegaly,       ECG:  Reviewed and interpreted by me: bradycardia 46  QTC: 371    Assessment:  CAd SP CABG  Acute hypercapnic respiratory failure post -op required BIPAP  Acute blood loss anemia  acute leukocytosis, explained by steroid dose given yesterday    PAST MEDICAL & SURGICAL HISTORY:  Female bladder prolapse  Diabetes  History of repair of hip fracture      PLAN:  Neuro: Pain control  Pulm: Encourage coughing, deep breathing and use of incentive spirometry. Wean off supplemental oxygen as able. Daily CXR.   Cardio: Monitor telemetry/alarms. Continue cardiac meds  GI: Tolerating diet. Continue stool softeners. Continue GI prophylaxis  Renal: monitor urine output, supplement electrolytes as needed  Vasc: Heparin SC/SCDs for DVT prophylaxis  Heme: Monitor H/H.   ID: Off antibiotics. Stable.  Endocrine: Monitor finger stick blood sugar and control hyperglycemia with insulin  Physical Therapy: OOB/ambulate  Tubes: Monitor Chest tube output      Discussed with Cardiothoracic Team at AM rounds.    45 minutes of critical care time spent providing medical care for patient's acute illness/conditions that impairs at least one vital organ system and/or poses a high risk of imminent or life threatening deterioration in the patient's condition. It includes time spent evaluating and treating the patient's acute illness as well as time spent reviewing labs, radiology, discussing goals of care with patient and/or patient's family, and discussing the case with a multidisciplinary team in an effort to prevent further life threatening deterioration or end organ damage. This time is independent of any procedures performed.

## 2019-10-08 NOTE — PHYSICAL THERAPY INITIAL EVALUATION ADULT - GENERAL OBSERVATIONS, REHAB EVAL
Chart Reviewed. Pt encountered in chair with +montejo, +tele, +BP cuff, +pulse ox, 2L O2 NC. Daughter present throughout tx session.
Chart Reviewed. Pt encountered seated in b/s chair +tele, +temp , +montejo, +CTs, +pulse ox, + R MLC.

## 2019-10-08 NOTE — PHYSICAL THERAPY INITIAL EVALUATION ADULT - LEVEL OF INDEPENDENCE: GAIT, REHAB EVAL
Pt refused to ambulate despite max encouragement and education. Pt refused standing marches with B platform cardiac walker secondary to pain and diarrhea

## 2019-10-08 NOTE — PHYSICAL THERAPY INITIAL EVALUATION ADULT - LIVES WITH, PROFILE
children/Pt lives in apartment with 3 SARAH with left handrail.
Pt lives in apartment with 3 SARAH with left handrail./children

## 2019-10-09 LAB
ALBUMIN SERPL ELPH-MCNC: 4.1 G/DL — SIGNIFICANT CHANGE UP (ref 3.5–5.2)
ALP SERPL-CCNC: 353 U/L — HIGH (ref 30–115)
ALT FLD-CCNC: 83 U/L — HIGH (ref 0–41)
ANION GAP SERPL CALC-SCNC: 13 MMOL/L — SIGNIFICANT CHANGE UP (ref 7–14)
APTT BLD: 35 SEC — SIGNIFICANT CHANGE UP (ref 27–39.2)
AST SERPL-CCNC: 145 U/L — HIGH (ref 0–41)
BASE EXCESS BLDA CALC-SCNC: -4 MMOL/L — LOW (ref -2–2)
BASOPHILS # BLD AUTO: 0.02 K/UL — SIGNIFICANT CHANGE UP (ref 0–0.2)
BASOPHILS NFR BLD AUTO: 0.1 % — SIGNIFICANT CHANGE UP (ref 0–1)
BILIRUB SERPL-MCNC: 0.4 MG/DL — SIGNIFICANT CHANGE UP (ref 0.2–1.2)
BUN SERPL-MCNC: 45 MG/DL — HIGH (ref 10–20)
CALCIUM SERPL-MCNC: 9 MG/DL — SIGNIFICANT CHANGE UP (ref 8.5–10.1)
CHLORIDE SERPL-SCNC: 110 MMOL/L — SIGNIFICANT CHANGE UP (ref 98–110)
CO2 SERPL-SCNC: 19 MMOL/L — SIGNIFICANT CHANGE UP (ref 17–32)
CREAT SERPL-MCNC: 1.1 MG/DL — SIGNIFICANT CHANGE UP (ref 0.7–1.5)
EOSINOPHIL # BLD AUTO: 0.02 K/UL — SIGNIFICANT CHANGE UP (ref 0–0.7)
EOSINOPHIL NFR BLD AUTO: 0.1 % — SIGNIFICANT CHANGE UP (ref 0–8)
GLUCOSE BLDC GLUCOMTR-MCNC: 100 MG/DL — HIGH (ref 70–99)
GLUCOSE BLDC GLUCOMTR-MCNC: 132 MG/DL — HIGH (ref 70–99)
GLUCOSE BLDC GLUCOMTR-MCNC: 143 MG/DL — HIGH (ref 70–99)
GLUCOSE BLDC GLUCOMTR-MCNC: 162 MG/DL — HIGH (ref 70–99)
GLUCOSE BLDC GLUCOMTR-MCNC: 166 MG/DL — HIGH (ref 70–99)
GLUCOSE BLDC GLUCOMTR-MCNC: 219 MG/DL — HIGH (ref 70–99)
GLUCOSE BLDC GLUCOMTR-MCNC: 57 MG/DL — LOW (ref 70–99)
GLUCOSE SERPL-MCNC: 56 MG/DL — LOW (ref 70–99)
HCO3 BLDA-SCNC: 21 MMOL/L — LOW (ref 23–27)
HCT VFR BLD CALC: 30.4 % — LOW (ref 37–47)
HCT VFR BLD CALC: 31 % — LOW (ref 37–47)
HCT VFR BLD CALC: 31.7 % — LOW (ref 37–47)
HGB BLD-MCNC: 10 G/DL — LOW (ref 12–16)
HGB BLD-MCNC: 10.2 G/DL — LOW (ref 12–16)
HGB BLD-MCNC: 10.6 G/DL — LOW (ref 12–16)
IMM GRANULOCYTES NFR BLD AUTO: 0.6 % — HIGH (ref 0.1–0.3)
INR BLD: 1.06 RATIO — SIGNIFICANT CHANGE UP (ref 0.65–1.3)
LYMPHOCYTES # BLD AUTO: 0.84 K/UL — LOW (ref 1.2–3.4)
LYMPHOCYTES # BLD AUTO: 6.2 % — LOW (ref 20.5–51.1)
MAGNESIUM SERPL-MCNC: 3.1 MG/DL — CRITICAL HIGH (ref 1.8–2.4)
MCHC RBC-ENTMCNC: 27.7 PG — SIGNIFICANT CHANGE UP (ref 27–31)
MCHC RBC-ENTMCNC: 27.8 PG — SIGNIFICANT CHANGE UP (ref 27–31)
MCHC RBC-ENTMCNC: 28.2 PG — SIGNIFICANT CHANGE UP (ref 27–31)
MCHC RBC-ENTMCNC: 32.9 G/DL — SIGNIFICANT CHANGE UP (ref 32–37)
MCHC RBC-ENTMCNC: 32.9 G/DL — SIGNIFICANT CHANGE UP (ref 32–37)
MCHC RBC-ENTMCNC: 33.4 G/DL — SIGNIFICANT CHANGE UP (ref 32–37)
MCV RBC AUTO: 84.2 FL — SIGNIFICANT CHANGE UP (ref 81–99)
MCV RBC AUTO: 84.3 FL — SIGNIFICANT CHANGE UP (ref 81–99)
MCV RBC AUTO: 84.5 FL — SIGNIFICANT CHANGE UP (ref 81–99)
MONOCYTES # BLD AUTO: 1.23 K/UL — HIGH (ref 0.1–0.6)
MONOCYTES NFR BLD AUTO: 9.1 % — SIGNIFICANT CHANGE UP (ref 1.7–9.3)
NEUTROPHILS # BLD AUTO: 11.26 K/UL — HIGH (ref 1.4–6.5)
NEUTROPHILS NFR BLD AUTO: 83.9 % — HIGH (ref 42.2–75.2)
NRBC # BLD: 0 /100 WBCS — SIGNIFICANT CHANGE UP (ref 0–0)
PCO2 BLDA: 39 MMHG — SIGNIFICANT CHANGE UP (ref 38–42)
PH BLDA: 7.35 — LOW (ref 7.38–7.42)
PLATELET # BLD AUTO: 159 K/UL — SIGNIFICANT CHANGE UP (ref 130–400)
PLATELET # BLD AUTO: 167 K/UL — SIGNIFICANT CHANGE UP (ref 130–400)
PLATELET # BLD AUTO: 168 K/UL — SIGNIFICANT CHANGE UP (ref 130–400)
PO2 BLDA: 58 MMHG — LOW (ref 78–95)
POTASSIUM SERPL-MCNC: 4.4 MMOL/L — SIGNIFICANT CHANGE UP (ref 3.5–5)
POTASSIUM SERPL-SCNC: 4.4 MMOL/L — SIGNIFICANT CHANGE UP (ref 3.5–5)
PROT SERPL-MCNC: 5.3 G/DL — LOW (ref 6–8)
PROTHROM AB SERPL-ACNC: 12.2 SEC — SIGNIFICANT CHANGE UP (ref 9.95–12.87)
RBC # BLD: 3.61 M/UL — LOW (ref 4.2–5.4)
RBC # BLD: 3.67 M/UL — LOW (ref 4.2–5.4)
RBC # BLD: 3.76 M/UL — LOW (ref 4.2–5.4)
RBC # FLD: 14.5 % — SIGNIFICANT CHANGE UP (ref 11.5–14.5)
RBC # FLD: 14.9 % — HIGH (ref 11.5–14.5)
RBC # FLD: 15.1 % — HIGH (ref 11.5–14.5)
SAO2 % BLDA: 91 % — LOW (ref 94–98)
SODIUM SERPL-SCNC: 142 MMOL/L — SIGNIFICANT CHANGE UP (ref 135–146)
WBC # BLD: 13.45 K/UL — HIGH (ref 4.8–10.8)
WBC # BLD: 14.52 K/UL — HIGH (ref 4.8–10.8)
WBC # BLD: 14.66 K/UL — HIGH (ref 4.8–10.8)
WBC # FLD AUTO: 13.45 K/UL — HIGH (ref 4.8–10.8)
WBC # FLD AUTO: 14.52 K/UL — HIGH (ref 4.8–10.8)
WBC # FLD AUTO: 14.66 K/UL — HIGH (ref 4.8–10.8)

## 2019-10-09 PROCEDURE — 71045 X-RAY EXAM CHEST 1 VIEW: CPT | Mod: 26

## 2019-10-09 PROCEDURE — 99291 CRITICAL CARE FIRST HOUR: CPT

## 2019-10-09 RX ORDER — PANTOPRAZOLE SODIUM 20 MG/1
40 TABLET, DELAYED RELEASE ORAL EVERY 12 HOURS
Refills: 0 | Status: DISCONTINUED | OUTPATIENT
Start: 2019-10-09 | End: 2019-10-10

## 2019-10-09 RX ORDER — FUROSEMIDE 40 MG
40 TABLET ORAL ONCE
Refills: 0 | Status: COMPLETED | OUTPATIENT
Start: 2019-10-09 | End: 2019-10-09

## 2019-10-09 RX ORDER — DEXTROSE 50 % IN WATER 50 %
15 SYRINGE (ML) INTRAVENOUS ONCE
Refills: 0 | Status: DISCONTINUED | OUTPATIENT
Start: 2019-10-09 | End: 2019-10-18

## 2019-10-09 RX ORDER — TRAMADOL HYDROCHLORIDE 50 MG/1
50 TABLET ORAL EVERY 4 HOURS
Refills: 0 | Status: DISCONTINUED | OUTPATIENT
Start: 2019-10-09 | End: 2019-10-14

## 2019-10-09 RX ORDER — TRAMADOL HYDROCHLORIDE 50 MG/1
25 TABLET ORAL ONCE
Refills: 0 | Status: DISCONTINUED | OUTPATIENT
Start: 2019-10-09 | End: 2019-10-09

## 2019-10-09 RX ADMIN — CHLORHEXIDINE GLUCONATE 5 MILLILITER(S): 213 SOLUTION TOPICAL at 05:17

## 2019-10-09 RX ADMIN — Medication 600 MILLIGRAM(S): at 17:10

## 2019-10-09 RX ADMIN — Medication 3 MILLILITER(S): at 08:28

## 2019-10-09 RX ADMIN — PANTOPRAZOLE SODIUM 40 MILLIGRAM(S): 20 TABLET, DELAYED RELEASE ORAL at 17:10

## 2019-10-09 RX ADMIN — CHLORHEXIDINE GLUCONATE 5 MILLILITER(S): 213 SOLUTION TOPICAL at 05:18

## 2019-10-09 RX ADMIN — Medication 0.25 MILLIGRAM(S): at 00:31

## 2019-10-09 RX ADMIN — TRAMADOL HYDROCHLORIDE 25 MILLIGRAM(S): 50 TABLET ORAL at 17:00

## 2019-10-09 RX ADMIN — CHLORHEXIDINE GLUCONATE 1 APPLICATION(S): 213 SOLUTION TOPICAL at 05:21

## 2019-10-09 RX ADMIN — Medication 40 MILLIGRAM(S): at 09:04

## 2019-10-09 RX ADMIN — Medication 125 MILLIGRAM(S): at 05:17

## 2019-10-09 RX ADMIN — Medication 10 UNIT(S): at 15:46

## 2019-10-09 RX ADMIN — TRAMADOL HYDROCHLORIDE 25 MILLIGRAM(S): 50 TABLET ORAL at 16:47

## 2019-10-09 RX ADMIN — Medication 125 MILLIGRAM(S): at 11:21

## 2019-10-09 RX ADMIN — Medication 125 MILLIGRAM(S): at 00:31

## 2019-10-09 RX ADMIN — TRAMADOL HYDROCHLORIDE 25 MILLIGRAM(S): 50 TABLET ORAL at 09:07

## 2019-10-09 RX ADMIN — MILRINONE LACTATE 8.58 MICROGRAM(S)/KG/MIN: 1 INJECTION, SOLUTION INTRAVENOUS at 09:05

## 2019-10-09 RX ADMIN — CHLORHEXIDINE GLUCONATE 15 MILLILITER(S): 213 SOLUTION TOPICAL at 05:18

## 2019-10-09 RX ADMIN — ATORVASTATIN CALCIUM 40 MILLIGRAM(S): 80 TABLET, FILM COATED ORAL at 22:22

## 2019-10-09 RX ADMIN — Medication 600 MILLIGRAM(S): at 05:20

## 2019-10-09 RX ADMIN — TRAMADOL HYDROCHLORIDE 25 MILLIGRAM(S): 50 TABLET ORAL at 09:40

## 2019-10-09 NOTE — PROGRESS NOTE ADULT - SUBJECTIVE AND OBJECTIVE BOX
SUBJ:  Post surgery and extubated    MEDICATIONS  (STANDING):  ALBUTerol/ipratropium for Nebulization 3 milliLiter(s) Nebulizer every 6 hours  atorvastatin 40 milliGRAM(s) Oral at bedtime  chlorhexidine 4% Liquid 1 Application(s) Topical <User Schedule>  dextrose 40% Gel 15 Gram(s) Oral once  dextrose 5%. 1000 milliLiter(s) (50 mL/Hr) IV Continuous <Continuous>  dextrose 50% Injectable 50 milliLiter(s) IV Push every 15 minutes  dextrose 50% Injectable 25 milliLiter(s) IV Push every 15 minutes  guaiFENesin  milliGRAM(s) Oral every 12 hours  insulin glargine Injectable (LANTUS) 30 Unit(s) SubCutaneous every morning  insulin lispro Injectable (HumaLOG) 10 Unit(s) SubCutaneous three times a day before meals  insulin regular Infusion 1 Unit(s)/Hr (1 mL/Hr) IV Continuous <Continuous>  magnesium sulfate  IVPB 1 Gram(s) IV Intermittent daily  milrinone Infusion 0.5 MICROgram(s)/kG/Min (8.58 mL/Hr) IV Continuous <Continuous>  pantoprazole Infusion 8 mG/Hr (10 mL/Hr) IV Continuous <Continuous>  sodium chloride 0.9%. 1000 milliLiter(s) (10 mL/Hr) IV Continuous <Continuous>  traMADol 25 milliGRAM(s) Oral once    MEDICATIONS  (PRN):  dextrose 40% Gel 15 Gram(s) Oral once PRN Blood Glucose LESS THAN 70 milliGRAM(s)/deciliter  glucagon  Injectable 1 milliGRAM(s) IntraMuscular once PRN Glucose LESS THAN 70 milligrams/deciliter  ondansetron  IVPB 4 milliGRAM(s) IV Intermittent once PRN Nausea and/or Vomiting  traMADol 25 milliGRAM(s) Oral every 4 hours PRN Mild Pain (1 - 3)  traMADol 50 milliGRAM(s) Oral every 4 hours PRN Moderate Pain (4 - 6)            Vital Signs Last 24 Hrs  T(C): 36.4 (09 Oct 2019 12:00), Max: 36.8 (08 Oct 2019 14:00)  T(F): 97.6 (09 Oct 2019 12:00), Max: 98.2 (08 Oct 2019 14:00)  HR: 77 (09 Oct 2019 12:00) (77 - 83)  BP: 122/65 (09 Oct 2019 08:00) (91/52 - 123/58)  BP(mean): 88 (09 Oct 2019 08:00) (67 - 89)  RR: 18 (09 Oct 2019 12:00) (14 - 25)  SpO2: 100% (09 Oct 2019 12:00) (93% - 100%)     REVIEW OF SYSTEMS:  CONSTITUTIONAL: No fever, weight loss, or fatigue  CARDIOLOGY: PAtient denies chest pain, shortness of breath or syncopal episodes.   RESPIRATORY: denies shortness of breath, wheezeing.   NEUROLOGICAL: NO weakness, no focal deficits to report.  ENDOCRINOLOGICAL: no recent change in diabetic medications.   GI: no BRBPR, no N,V,diarrhea.    PSYCHIATRY: normal mood and affect  HEENT: no nasal discharge, no ecchymosis  SKIN: no ecchymosis, no breakdown  MUSCULOSKELETAL: Full range of motion x4.        PHYSICAL EXAM:  · CONSTITUTIONAL:	Well-developed, well nourished    BMI-  ·RESPIRATORY:   airway patent; breath sounds equal; good air movement; respirations non-labored; clear to auscultation bilaterally; no chest wall tenderness; no intercostal retractions; no rales,rhonchi or wheeze  · CARDIOVASCULAR	regular rate and rhythm  no rub  no murmur  normal PMI  · EXTREMITIES: No cyanosis, clubbing or edema  · VASCULAR: 	Equal and normal pulses (carotid, femoral, dorsalis pedis)  	  TELEMETRY:    ECG:    TTE:    LABS:                        10.6   14.66 )-----------( 167      ( 09 Oct 2019 08:45 )             31.7     10-09    142  |  110  |  45<H>  ----------------------------<  56<L>  4.4   |  19  |  1.1    Ca    9.0      09 Oct 2019 01:50  Mg     3.1     10-09    TPro  5.3<L>  /  Alb  4.1  /  TBili  0.4  /  DBili  x   /  AST  145<H>  /  ALT  83<H>  /  AlkPhos  353<H>  10-09        PT/INR - ( 09 Oct 2019 01:50 )   PT: 12.20 sec;   INR: 1.06 ratio         PTT - ( 09 Oct 2019 01:50 )  PTT:35.0 sec    I&O's Summary    08 Oct 2019 07:01  -  09 Oct 2019 07:00  --------------------------------------------------------  IN: 3427.5 mL / OUT: 977 mL / NET: 2450.5 mL    09 Oct 2019 07:01  -  09 Oct 2019 12:58  --------------------------------------------------------  IN: 220.8 mL / OUT: 226 mL / NET: -5.2 mL      BNP  RADIOLOGY & ADDITIONAL STUDIES:    IMPRESSION AND PLAN:    extubated off pressures  OOB and on the chair  continue with current management

## 2019-10-09 NOTE — PROGRESS NOTE ADULT - SUBJECTIVE AND OBJECTIVE BOX
GUS WILBURN  68y, Female  Allergy: No Known Allergies      CHIEF COMPLAINT: cdiff (08 Oct 2019 15:30)      INTERVAL EVENTS/HPI  - No acute events overnight, +FOBT, melena  - T(F): , Max: 98.2 (10-08-19 @ 14:00)  - Denies any worsening symptoms  - Tolerating medication  - WBC Count: 13.45 (10-09-19 @ 01:50)      ROS  General: Denies rigors, nightsweats  HEENT: Denies headache, rhinorrhea, sore throat, eye pain  CV: Denies CP, palpitations  PULM: Denies SOB, wheezing  GI: Denies hematemesis, hematochezia, melena  : Denies discharge, hematuria  MSK: Denies arthralgias, myalgias  SKIN: Denies rash, lesions  NEURO: Denies paresthesias, weakness  PSYCH: Denies depression, anxiety    VITALS:  T(F): 97.8, Max: 98.2 (10-08-19 @ 14:00)  HR: 79  BP: 105/59  RR: 14Vital Signs Last 24 Hrs  T(C): 36.6 (09 Oct 2019 04:00), Max: 36.8 (08 Oct 2019 14:00)  T(F): 97.8 (09 Oct 2019 04:00), Max: 98.2 (08 Oct 2019 14:00)  HR: 79 (09 Oct 2019 07:00) (79 - 83)  BP: 105/59 (09 Oct 2019 07:00) (91/52 - 123/58)  BP(mean): 76 (09 Oct 2019 07:00) (67 - 89)  RR: 14 (09 Oct 2019 04:00) (14 - 29)  SpO2: 96% (09 Oct 2019 07:00) (93% - 100%)    PHYSICAL EXAM:  Gen: NAD, resting in bed  HEENT: Normocephalic, atraumatic  Neck: supple, no lymphadenopathy  CV: Regular rate & regular rhythm, midline incision  Lungs: decreased BS at bases, no fremitus  Abdomen: Soft, BS present  Ext: Warm, well perfused  Neuro: non focal, awake  Skin: no rash, no erythema  Lines: no phlebitis  montejo      FH: Non-contributory  Social Hx: Non-contributory    TESTS & MEASUREMENTS:                        10.0   13.45 )-----------( 168      ( 09 Oct 2019 01:50 )             30.4     10-09    142  |  110  |  45<H>  ----------------------------<  56<L>  4.4   |  19  |  1.1    Ca    9.0      09 Oct 2019 01:50  Mg     3.1     10-09    TPro  5.3<L>  /  Alb  4.1  /  TBili  0.4  /  DBili  x   /  AST  145<H>  /  ALT  83<H>  /  AlkPhos  353<H>  10-09    eGFR if Non African American: 52 mL/min/1.73M2 (10-09-19 @ 01:50)  eGFR if African American: 61 mL/min/1.73M2 (10-09-19 @ 01:50)  eGFR if Non African American: 52 mL/min/1.73M2 (10-08-19 @ 17:30)  eGFR if : 61 mL/min/1.73M2 (10-08-19 @ 17:30)    LIVER FUNCTIONS - ( 09 Oct 2019 01:50 )  Alb: 4.1 g/dL / Pro: 5.3 g/dL / ALK PHOS: 353 U/L / ALT: 83 U/L / AST: 145 U/L / GGT: x                     INFECTIOUS DISEASES TESTING  MRSA PCR Result.: Negative (10-06-19 @ 20:00)      RADIOLOGY & ADDITIONAL TESTS:  I have personally reviewed the last Chest xray  CXR      CT      CARDIOLOGY TESTING  12 Lead ECG:   Ventricular Rate 46 BPM    Atrial Rate 46 BPM    P-R Interval 192 ms    QRS Duration 126 ms    Q-T Interval 424 ms    QTC Calculation(Bezet) 371 ms    P Axis 76 degrees    R Axis -43 degrees    T Axis -4 degrees    Diagnosis Line Sinus bradycardia  Left axis deviation  Left anterior fascicular block  Non-specific intra-ventricular conduction block  Cannot rule out Septal infarct , age undetermined  Possible Lateral infarct , age undetermined  Abnormal ECG    Confirmed by ZAHRAA ALEXANDER MD ([id number]) on 10/8/2019 11:30:17 AM (10-08-19 @ 06:34)  12 Lead ECG:   Ventricular Rate 97 BPM    Atrial Rate 125 BPM    QRS Duration 128 ms    Q-T Interval 374 ms    QTC Calculation(Bezet) 474 ms    P Axis 75 degrees    R Axis -53 degrees    T Axis -85 degrees    Diagnosis Line Sinus tachycardia with 2nd degree A-V block (Mobitz I)  Non-specific intra-ventricular conduction block  Lateral infarct , age undetermined  T wave abnormality, consider inferior ischemia  Abnormal ECG    Confirmed by ZAHRAA ALEXANDER MD [id number]) on 10/8/2019 11:37:07 AM (10-07-19 @ 15:59)      MEDICATIONS  albumin human  5% IVPB 125  ALBUTerol/ipratropium for Nebulization 3  atorvastatin 40  chlorhexidine 0.12% Liquid 5  chlorhexidine 0.12% Liquid 15  chlorhexidine 4% Liquid 1  dextrose 40% Gel 15  dextrose 5%. 1000  dextrose 50% Injectable 50  dextrose 50% Injectable 25  furosemide   Injectable 40  guaiFENesin   insulin glargine Injectable (LANTUS) 30  insulin lispro Injectable (HumaLOG) 10  insulin regular Infusion 1  magnesium sulfate  IVPB 1  milrinone Infusion 0.5  norepinephrine Infusion 0.05  pantoprazole Infusion 8  sodium chloride 0.9%. 1000  vancomycin    Solution 125  vasopressin Infusion 0.04      ANTIBIOTICS:  vancomycin    Solution 125 milliGRAM(s) Oral every 6 hours      All available historical records have been reviewed

## 2019-10-09 NOTE — PROGRESS NOTE ADULT - ASSESSMENT
probable gastritis with self limited bleed  plan - conservative management with protonicx 40 bid   and follow hgb. if active bleeding occurs would scope on an emergent basis  ouitpatient diagnostic egd in 4- 6 weeks   start diabetic diet

## 2019-10-09 NOTE — PROGRESS NOTE ADULT - SUBJECTIVE AND OBJECTIVE BOX
Chief Complaint: Patient is a 68y old  Female who presents with a chief complaint of HFrEF (09 Oct 2019 12:57  informed by gi fellow that patient in hospital for cardiac bypass surgery and slight drop in h/h and possible melena. gi fellow chelsi showed dark green stool guaic pos. hgb appears baseline.recent episode of c diff . clinically feels .well without any bm for last  12 hours)        Review Of Systems:    Medications:  ALBUTerol/ipratropium for Nebulization 3 milliLiter(s) Nebulizer every 6 hours  atorvastatin 40 milliGRAM(s) Oral at bedtime  chlorhexidine 4% Liquid 1 Application(s) Topical <User Schedule>  dextrose 40% Gel 15 Gram(s) Oral once  dextrose 40% Gel 15 Gram(s) Oral once PRN  dextrose 5%. 1000 milliLiter(s) IV Continuous <Continuous>  dextrose 50% Injectable 50 milliLiter(s) IV Push every 15 minutes  dextrose 50% Injectable 25 milliLiter(s) IV Push every 15 minutes  glucagon  Injectable 1 milliGRAM(s) IntraMuscular once PRN  guaiFENesin  milliGRAM(s) Oral every 12 hours  insulin glargine Injectable (LANTUS) 30 Unit(s) SubCutaneous every morning  insulin lispro Injectable (HumaLOG) 10 Unit(s) SubCutaneous three times a day before meals  insulin regular Infusion 1 Unit(s)/Hr IV Continuous <Continuous>  magnesium sulfate  IVPB 1 Gram(s) IV Intermittent daily  milrinone Infusion 0.5 MICROgram(s)/kG/Min IV Continuous <Continuous>  ondansetron  IVPB 4 milliGRAM(s) IV Intermittent once PRN  pantoprazole Infusion 8 mG/Hr IV Continuous <Continuous>  sodium chloride 0.9%. 1000 milliLiter(s) IV Continuous <Continuous>  traMADol 25 milliGRAM(s) Oral every 4 hours PRN  traMADol 25 milliGRAM(s) Oral once  traMADol 50 milliGRAM(s) Oral every 4 hours PRN      PMHX/PSHX:  Female bladder prolapse  Diabetes  No pertinent past medical history  History of repair of hip fracture      Family history:  FH: myocardial infarction  FH: stomach cancer      Social History:       Allergies:  No Known Allergies            PHYSICAL EXAM:   Vital Signs:  Vital Signs Last 24 Hrs  T(C): 36.4 (09 Oct 2019 12:00), Max: 36.7 (08 Oct 2019 16:00)  T(F): 97.6 (09 Oct 2019 12:00), Max: 98.1 (08 Oct 2019 16:00)  HR: 81 (09 Oct 2019 14:00) (77 - 83)  BP: 122/65 (09 Oct 2019 08:00) (91/52 - 123/58)  BP(mean): 88 (09 Oct 2019 08:00) (67 - 89)  RR: 18 (09 Oct 2019 14:00) (14 - 25)  SpO2: 100% (09 Oct 2019 14:00) (93% - 100%)  Daily     Daily     T(C): 36.4 (10-09-19 @ 12:00), Max: 36.7 (10-08-19 @ 16:00)  HR: 81 (10-09-19 @ 14:00) (77 - 83)  BP: 122/65 (10-09-19 @ 08:00) (91/52 - 123/58)  RR: 18 (10-09-19 @ 14:00) (14 - 25)  SpO2: 100% (10-09-19 @ 14:00) (93% - 100%)    GENERAL:  Appears stated age, well-groomed, well-nourished, no distress  ABDOMEN:  Soft, non-tender, non-distended, normoactive bowel sounds,  no masses ,no hepato-splenomegaly, no signs of chronic liver disease        LABS:                        10.2   14.52 )-----------( 159      ( 09 Oct 2019 12:00 )             31.0     10-09    142  |  110  |  45<H>  ----------------------------<  56<L>  4.4   |  19  |  1.1    Ca    9.0      09 Oct 2019 01:50  Mg     3.1     10-09    TPro  5.3<L>  /  Alb  4.1  /  TBili  0.4  /  DBili  x   /  AST  145<H>  /  ALT  83<H>  /  AlkPhos  353<H>  10-09    LIVER FUNCTIONS - ( 09 Oct 2019 01:50 )  Alb: 4.1 g/dL / Pro: 5.3 g/dL / ALK PHOS: 353 U/L / ALT: 83 U/L / AST: 145 U/L / GGT: x           PT/INR - ( 09 Oct 2019 01:50 )   PT: 12.20 sec;   INR: 1.06 ratio         PTT - ( 09 Oct 2019 01:50 )  PTT:35.0 sec        Imaging:

## 2019-10-09 NOTE — PROGRESS NOTE ADULT - ASSESSMENT
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 Denies abd pain. Diarrhea is watery, no foul smell.     IMPRESSION  #Diarrhea, likely melena 2/2 GIB, lower suspicion for recurrent Cdiff as recent kayexalate, no abd pain, no foul smell.     +Leukocytosis could be stress related 2/2 OR and recent steroids, downtrending     PO vanc ended 10/5    RECOMMENDATIONS  - D/C PO Vanc and monitor    Spectra [id number]

## 2019-10-09 NOTE — PROGRESS NOTE ADULT - SUBJECTIVE AND OBJECTIVE BOX
OPERATIVE PROCEDURE(s):   CABG x 3             POD #   2                    68yFemale  SURGEON(s): CHINA Escobar  SUBJECTIVE ASSESSMENT:  Patient has no complaints at this time.    Vital Signs Last 24 Hrs  T(F): 97.8 (09 Oct 2019 04:00), Max: 98.2 (08 Oct 2019 14:00)  HR: 79 (09 Oct 2019 07:00) (79 - 83) A-Paced  BP: 105/59 (09 Oct 2019 07:00) (91/52 - 123/58)  BP(mean): 76 (09 Oct 2019 07:00) (67 - 89)  ABP: 105/49 (09 Oct 2019 07:00) (58/55 - 125/55)  ABP(mean): 67 (09 Oct 2019 07:00)  RR: 14 (09 Oct 2019 04:00) (14 - 29)  SpO2: 96% (09 Oct 2019 07:00) (93% - 100%) RA  CVP(mm Hg): 7 (08 Oct 2019 10:00)  CVP(cm H2O): --  CO: 4.4 (08 Oct 2019 10:00)  CI: 2.8 (08 Oct 2019 10:00)  PA: 38/17 (08 Oct 2019 10:00)  SVR: 974 (08 Oct 2019 10:00)    I&O's Detail    08 Oct 2019 07:01  -  09 Oct 2019 07:00  --------------------------------------------------------  IN:    Albumin 5%  - 500 mL: 1250 mL    amiodarone Infusion: 16.6 mL    insulin regular Infusion: 54 mL    IV PiggyBack: 200 mL    milrinone  Infusion: 153.6 mL    norepinephrine Infusion: 45.7 mL    Oral Fluid: 430 mL    Packed Red Blood Cells: 600 mL    pantoprazole Infusion: 140 mL    sodium chloride 0.9%.: 480 mL    vasopressin Infusion: 57.6 mL  Total IN: 3427.5 mL    OUT:    Chest Tube: 320 mL    Chest Tube: 40 mL    Indwelling Catheter - Urethral: 617 mL  Total OUT: 977 mL        Net:   I&O's Detail    07 Oct 2019 07:01  -  08 Oct 2019 07:00  --------------------------------------------------------  Total NET: 652.8 mL      08 Oct 2019 07:01  -  09 Oct 2019 07:00  --------------------------------------------------------  Total NET: 2450.5 mL        CAPILLARY BLOOD GLUCOSE  175 (08 Oct 2019 19:45)  183 (08 Oct 2019 13:08)  162 (08 Oct 2019 10:00)  142 (08 Oct 2019 08:00)      POCT Blood Glucose.: 143 mg/dL (09 Oct 2019 06:35)  POCT Blood Glucose.: 132 mg/dL (09 Oct 2019 04:43)  POCT Blood Glucose.: 100 mg/dL (09 Oct 2019 02:23)  POCT Blood Glucose.: 57 mg/dL (09 Oct 2019 01:50)  POCT Blood Glucose.: 147 mg/dL (08 Oct 2019 20:58)  POCT Blood Glucose.: 178 mg/dL (08 Oct 2019 19:45)  POCT Blood Glucose.: 204 mg/dL (08 Oct 2019 18:50)  POCT Blood Glucose.: 231 mg/dL (08 Oct 2019 17:54)  POCT Blood Glucose.: 230 mg/dL (08 Oct 2019 16:24)  POCT Blood Glucose.: 183 mg/dL (08 Oct 2019 13:05)  POCT Blood Glucose.: 162 mg/dL (08 Oct 2019 09:57)    Physical Exam:  General: NAD; A&Ox3  Cardiac: S1/S2, RRR, no murmur, no rubs  Lungs: unlabored respirations, CTA b/l, no wheeze, no rales, no crackles  Abdomen: Soft/NT/ND; positive bowel sounds x 4  Sternum: Intact, no click, incision healing well with no drainage  Incisions: Incisions clean/dry/intact  Extremities: No edema b/l lower extremities; good capillary refill; no cyanosis; palpable 1+ pedal pulses b/l    Central Venous Catheter: Yes[x]  No[] , If Yes indication:           Day #2  Castellon Catheter: Yes  [x] , No  [] , If yes indication:                      Day #11  EPICARDIAL WIRES:  [x] YES [] NO                                              Day #2  BOWEL MOVEMENT:  [x] YES [] NO, If No, Timing since last BM:      Day #  CHEST TUBE(Left/Right):  [] YES [x] NO, If yes -  AIR LEAKS:  [] YES [] NO        LABS:                        10.0<L>  13.45<H> )-----------( 168      ( 09 Oct 2019 01:50 )             30.4<L>                        7.1<L>  12.00<H> )-----------( 144      ( 08 Oct 2019 17:30 )             22.2<L>    10-09    142  |  110  |  45<H>  ----------------------------<  56<L>  4.4   |  19  |  1.1  10-08    139  |  109  |  44<H>  ----------------------------<  211<H>  4.8   |  19  |  1.1    Ca    9.0      09 Oct 2019 01:50  Mg     3.1     10-09    TPro  5.3<L> [6.0 - 8.0]  /  Alb  4.1 [3.5 - 5.2]  /  TBili  0.4 [0.2 - 1.2]  /  DBili  x   /  AST  145<H> [0 - 41]  /  ALT  83<H> [0 - 41]  /  AlkPhos  353<H> [30 - 115]  10-09    PT/INR - ( 09 Oct 2019 01:50 )   PT: ;   INR: 1.06 ratio         PT/INR - ( 08 Oct 2019 01:30 )   PT: ;   INR: 1.05 ratio         PTT - ( 09 Oct 2019 01:50 )  PTT:35.0 sec, PTT - ( 08 Oct 2019 01:30 )  PTT:33.3 sec    ABG - ( 09 Oct 2019 03:30 )  pH: 7.35  /  pCO2: 39    /  pO2: 58    / HCO3: 21    / Base Excess: -4.0  /  SaO2: 91    /  LA: 0.5            MEDICATIONS  (STANDING):  albumin human  5% IVPB 125 milliLiter(s) IV Intermittent once  ALBUTerol/ipratropium for Nebulization 3 milliLiter(s) Nebulizer every 6 hours  atorvastatin 40 milliGRAM(s) Oral at bedtime  chlorhexidine 0.12% Liquid 5 milliLiter(s) Oral Mucosa every 4 hours  chlorhexidine 0.12% Liquid 15 milliLiter(s) Oral Mucosa every 12 hours  chlorhexidine 4% Liquid 1 Application(s) Topical <User Schedule>  dextrose 40% Gel 15 Gram(s) Oral once  dextrose 5%. 1000 milliLiter(s) (50 mL/Hr) IV Continuous <Continuous>  dextrose 50% Injectable 50 milliLiter(s) IV Push every 15 minutes  dextrose 50% Injectable 25 milliLiter(s) IV Push every 15 minutes  furosemide   Injectable 40 milliGRAM(s) IV Push once  guaiFENesin  milliGRAM(s) Oral every 12 hours  insulin glargine Injectable (LANTUS) 30 Unit(s) SubCutaneous every morning  insulin lispro Injectable (HumaLOG) 10 Unit(s) SubCutaneous three times a day before meals  insulin regular Infusion 1 Unit(s)/Hr (1 mL/Hr) IV Continuous <Continuous>  magnesium sulfate  IVPB 1 Gram(s) IV Intermittent daily  milrinone Infusion 0.5 MICROgram(s)/kG/Min (8.58 mL/Hr) IV Continuous <Continuous>  norepinephrine Infusion 0.05 MICROgram(s)/kG/Min (5.363 mL/Hr) IV Continuous <Continuous>  pantoprazole Infusion 8 mG/Hr (10 mL/Hr) IV Continuous <Continuous>  sodium chloride 0.9%. 1000 milliLiter(s) (10 mL/Hr) IV Continuous <Continuous>  vancomycin    Solution 125 milliGRAM(s) Oral every 6 hours  vasopressin Infusion 0.04 Unit(s)/Min (2.4 mL/Hr) IV Continuous <Continuous>    MEDICATIONS  (PRN):  dextrose 40% Gel 15 Gram(s) Oral once PRN Blood Glucose LESS THAN 70 milliGRAM(s)/deciliter  glucagon  Injectable 1 milliGRAM(s) IntraMuscular once PRN Glucose LESS THAN 70 milligrams/deciliter  ondansetron  IVPB 4 milliGRAM(s) IV Intermittent once PRN Nausea and/or Vomiting  traMADol 25 milliGRAM(s) Oral every 4 hours PRN Mild Pain (1 - 3)    No Pharmacologic DVT Prophylaxis due to GI Bleed  SCD's: YES b/l  GI Prophylaxis: Protonix GTT [x], Pepcid [], None [], (Contra-indication:.....)    Post-Op Aspirin: Yes [],  No [x], If No, then contra-indication: GI Bleeding  Post-Op Statin: Yes [], No[x], If No, then contra-indication: Rising LFT's  Post-Op Beta-Blockers: Yes [], No [x], If No, then contra-indication: Hemodynamically unstable on and off pressors; on Primacor now    Allergies:  No Known Allergies      Assessment/Plan:  68y Female status-post CABG  - Case and plan discussed with CTU Intensivist and CT Surgeon - Dr. Urbano/Flaco/Shawn   - Continue CTU supportive care    - Continue DVT/GI prophylaxis  - Incentive Spirometry 10 times an hour  - Continue to advance physical activity as tolerated and continue PT/OT as directed  1. CAD: OPERATIVE PROCEDURE(s):   CABG x 3             POD #   2                    68yFemale  SURGEON(s): CHINA Escobar  SUBJECTIVE ASSESSMENT:  Patient has no complaints at this time.    Vital Signs Last 24 Hrs  T(F): 97.8 (09 Oct 2019 04:00), Max: 98.2 (08 Oct 2019 14:00)  HR: 79 (09 Oct 2019 07:00) (79 - 83) A-Paced  BP: 105/59 (09 Oct 2019 07:00) (91/52 - 123/58)  BP(mean): 76 (09 Oct 2019 07:00) (67 - 89)  ABP: 105/49 (09 Oct 2019 07:00) (58/55 - 125/55)  ABP(mean): 67 (09 Oct 2019 07:00)  RR: 14 (09 Oct 2019 04:00) (14 - 29)  SpO2: 96% (09 Oct 2019 07:00) (93% - 100%) RA  CVP(mm Hg): 7 (08 Oct 2019 10:00)  CVP(cm H2O): --  CO: 4.4 (08 Oct 2019 10:00)  CI: 2.8 (08 Oct 2019 10:00)  PA: 38/17 (08 Oct 2019 10:00)  SVR: 974 (08 Oct 2019 10:00)    I&O's Detail    08 Oct 2019 07:01  -  09 Oct 2019 07:00  --------------------------------------------------------  IN:    Albumin 5%  - 500 mL: 1250 mL    amiodarone Infusion: 16.6 mL    insulin regular Infusion: 54 mL    IV PiggyBack: 200 mL    milrinone  Infusion: 153.6 mL    norepinephrine Infusion: 45.7 mL    Oral Fluid: 430 mL    Packed Red Blood Cells: 600 mL    pantoprazole Infusion: 140 mL    sodium chloride 0.9%.: 480 mL    vasopressin Infusion: 57.6 mL  Total IN: 3427.5 mL    OUT:    Chest Tube: 320 mL    Chest Tube: 40 mL    Indwelling Catheter - Urethral: 617 mL  Total OUT: 977 mL        Net:   I&O's Detail    07 Oct 2019 07:01  -  08 Oct 2019 07:00  --------------------------------------------------------  Total NET: 652.8 mL      08 Oct 2019 07:01  -  09 Oct 2019 07:00  --------------------------------------------------------  Total NET: 2450.5 mL        CAPILLARY BLOOD GLUCOSE  175 (08 Oct 2019 19:45)  183 (08 Oct 2019 13:08)  162 (08 Oct 2019 10:00)  142 (08 Oct 2019 08:00)      POCT Blood Glucose.: 143 mg/dL (09 Oct 2019 06:35)  POCT Blood Glucose.: 132 mg/dL (09 Oct 2019 04:43)  POCT Blood Glucose.: 100 mg/dL (09 Oct 2019 02:23)  POCT Blood Glucose.: 57 mg/dL (09 Oct 2019 01:50)  POCT Blood Glucose.: 147 mg/dL (08 Oct 2019 20:58)  POCT Blood Glucose.: 178 mg/dL (08 Oct 2019 19:45)  POCT Blood Glucose.: 204 mg/dL (08 Oct 2019 18:50)  POCT Blood Glucose.: 231 mg/dL (08 Oct 2019 17:54)  POCT Blood Glucose.: 230 mg/dL (08 Oct 2019 16:24)  POCT Blood Glucose.: 183 mg/dL (08 Oct 2019 13:05)  POCT Blood Glucose.: 162 mg/dL (08 Oct 2019 09:57)    Physical Exam:  General: NAD; A&Ox3  Cardiac: S1/S2, RRR, no murmur, no rubs  Lungs: unlabored respirations, CTA b/l, no wheeze, no rales, no crackles  Abdomen: Soft/NT/ND; positive bowel sounds x 4  Sternum: Intact, no click, incision healing well with no drainage  Incisions: Incisions clean/dry/intact  Extremities: No edema b/l lower extremities; good capillary refill; no cyanosis; palpable 1+ pedal pulses b/l    Central Venous Catheter: Yes[x]  No[] , If Yes indication: unstable          Day #2  Castellon Catheter: Yes  [x] , No  [] , If yes indication:          bladder prolapse            Day #11  EPICARDIAL WIRES:  [x] YES [] NO                                              Day #2  BOWEL MOVEMENT:  [x] YES [] NO, If No, Timing since last BM:      Day #  CHEST TUBE(Left/Right):  [] YES [x] NO, If yes -  AIR LEAKS:  [] YES [] NO        LABS:                        10.0<L>  13.45<H> )-----------( 168      ( 09 Oct 2019 01:50 )             30.4<L>                        7.1<L>  12.00<H> )-----------( 144      ( 08 Oct 2019 17:30 )             22.2<L>    10-09    142  |  110  |  45<H>  ----------------------------<  56<L>  4.4   |  19  |  1.1  10-08    139  |  109  |  44<H>  ----------------------------<  211<H>  4.8   |  19  |  1.1    Ca    9.0      09 Oct 2019 01:50  Mg     3.1     10-09    TPro  5.3<L> [6.0 - 8.0]  /  Alb  4.1 [3.5 - 5.2]  /  TBili  0.4 [0.2 - 1.2]  /  DBili  x   /  AST  145<H> [0 - 41]  /  ALT  83<H> [0 - 41]  /  AlkPhos  353<H> [30 - 115]  10-09    PT/INR - ( 09 Oct 2019 01:50 )   PT: ;   INR: 1.06 ratio         PT/INR - ( 08 Oct 2019 01:30 )   PT: ;   INR: 1.05 ratio         PTT - ( 09 Oct 2019 01:50 )  PTT:35.0 sec, PTT - ( 08 Oct 2019 01:30 )  PTT:33.3 sec    ABG - ( 09 Oct 2019 03:30 )  pH: 7.35  /  pCO2: 39    /  pO2: 58    / HCO3: 21    / Base Excess: -4.0  /  SaO2: 91    /  LA: 0.5            MEDICATIONS  (STANDING):  albumin human  5% IVPB 125 milliLiter(s) IV Intermittent once  ALBUTerol/ipratropium for Nebulization 3 milliLiter(s) Nebulizer every 6 hours  atorvastatin 40 milliGRAM(s) Oral at bedtime  chlorhexidine 0.12% Liquid 5 milliLiter(s) Oral Mucosa every 4 hours  chlorhexidine 0.12% Liquid 15 milliLiter(s) Oral Mucosa every 12 hours  chlorhexidine 4% Liquid 1 Application(s) Topical <User Schedule>  dextrose 40% Gel 15 Gram(s) Oral once  dextrose 5%. 1000 milliLiter(s) (50 mL/Hr) IV Continuous <Continuous>  dextrose 50% Injectable 50 milliLiter(s) IV Push every 15 minutes  dextrose 50% Injectable 25 milliLiter(s) IV Push every 15 minutes  furosemide   Injectable 40 milliGRAM(s) IV Push once  guaiFENesin  milliGRAM(s) Oral every 12 hours  insulin glargine Injectable (LANTUS) 30 Unit(s) SubCutaneous every morning  insulin lispro Injectable (HumaLOG) 10 Unit(s) SubCutaneous three times a day before meals  insulin regular Infusion 1 Unit(s)/Hr (1 mL/Hr) IV Continuous <Continuous>  magnesium sulfate  IVPB 1 Gram(s) IV Intermittent daily  milrinone Infusion 0.5 MICROgram(s)/kG/Min (8.58 mL/Hr) IV Continuous <Continuous>  norepinephrine Infusion 0.05 MICROgram(s)/kG/Min (5.363 mL/Hr) IV Continuous <Continuous>  pantoprazole Infusion 8 mG/Hr (10 mL/Hr) IV Continuous <Continuous>  sodium chloride 0.9%. 1000 milliLiter(s) (10 mL/Hr) IV Continuous <Continuous>  vancomycin    Solution 125 milliGRAM(s) Oral every 6 hours  vasopressin Infusion 0.04 Unit(s)/Min (2.4 mL/Hr) IV Continuous <Continuous>    MEDICATIONS  (PRN):  dextrose 40% Gel 15 Gram(s) Oral once PRN Blood Glucose LESS THAN 70 milliGRAM(s)/deciliter  glucagon  Injectable 1 milliGRAM(s) IntraMuscular once PRN Glucose LESS THAN 70 milligrams/deciliter  ondansetron  IVPB 4 milliGRAM(s) IV Intermittent once PRN Nausea and/or Vomiting  traMADol 25 milliGRAM(s) Oral every 4 hours PRN Mild Pain (1 - 3)    No Pharmacologic DVT Prophylaxis due to GI Bleed  SCD's: YES b/l  GI Prophylaxis: Protonix GTT [x], Pepcid [], None [], (Contra-indication:.....)    Post-Op Aspirin: Yes [],  No [x], If No, then contra-indication: GI Bleeding  Post-Op Statin: Yes [], No[x], If No, then contra-indication: Rising LFT's  Post-Op Beta-Blockers: Yes [], No [x], If No, then contra-indication: Hemodynamically unstable on and off pressors; on Primacor now    Allergies:  No Known Allergies      Assessment/Plan:  68y Female status-post CABG  - Case and plan discussed with CTU Intensivist and CT Surgeon - Dr. Urbano/Flaco/Shawn   - Continue CTU supportive care    - Continue DVT/GI prophylaxis  - Incentive Spirometry 10 times an hour  - Continue to advance physical activity as tolerated and continue PT/OT as directed  1. CAD: Hold ASA for GI Bleeding; hold statin for elevated LFT's, hold Beta blocker for hemodynamic instability, pt on primcaor gtt  2. GI Bleeding: Protonix gtt and NPO; Patient had q4h CBC and now has stable h/h.  Melena and diarrhea resolved.  As per Dr. Liriano - GI may advance diet and change protonix gtt to PO q12h  3. C. Diff - ID cleared patient, no active C. Diff infection; d/c PO vanco and d/c contact iso after 48h no diarrhea and after room is cycle cleaned  4. Maintain Primacor gtt today; start weaning tomorrow  5. D/C chest tubes today  6. Lasix 40mg IV today

## 2019-10-09 NOTE — PROGRESS NOTE ADULT - ASSESSMENT
HPI:  68F with PMHx of uncontrolled DM (non-compliant), viral myopathy, Paroxysmal Afib, recent labial abscess s/p ID, hip fracture s/p fixation, recent LGIB bleed now presents for shortness of breath.     A/P   1- CABG POD# 2  2- hx A fib   3- Acute /chronic systolic heart failure  4- Melena Gi bleed   a  5- acute anemia of blood loss  6- hx  c diff no diarea today    Plan  -continue milrinon  - lasix 40 iv x1  - anemia of acute blood loss      Gi bleed melena   d/c  Vanco po     spoek with Gi will start clear HPI:  68F with PMHx of uncontrolled DM (non-compliant), viral myopathy, Paroxysmal Afib, recent labial abscess s/p ID, hip fracture s/p fixation, recent LGIB bleed now presents for shortness of breath.     A/P   1- CABG POD# 2  2- hx A fib   3- Acute /chronic systolic heart failure  4- Melena Gi bleed   a  5- acute anemia of blood loss  6- hx  c diff no diarea today   fluid overload  Plan  -continue milrinon  - lasix 40 iv x1  - anemia of acute blood loss      Gi bleed melena   d/c  Vanco po     spoek with Gi will start clear

## 2019-10-09 NOTE — PROGRESS NOTE ADULT - SUBJECTIVE AND OBJECTIVE BOX
CTU Attending Progress Daily Note     09 Oct 2019 13:25  POD# - 2  He has history of Female bladder prolapse  Diabetes    Interval event for past 24 hr:  GUS WILBURN  68y had no event.   remains extubated   no more active Gi bleed    Current Complains:  GUS WILBURN has no new complains  HPI:  68F with PMHx of uncontrolled DM (non-compliant), viral myopathy, Paroxysmal Afib, recent labial abscess s/p ID, hip fracture s/p fixation, recent LGIB bleed now presents for shortness of breath. Patient has been having shortness of breath for the last day. Occurs on exertion and stationary however no worsening of breathing when laying down. Patient denied abdominal pain however family reports she has been having some abdominal discomfort over the last day. Patient also has an indwelling montejo for the last 3 weeks for urinary retention. Reports cough with no sputum production. Denies chest pain, fever, nausea/vomiting, diarrhea. (21 Sep 2019 21:52)    OBJECTIVE:  ICU Vital Signs Last 24 Hrs  T(C): 36.4 (09 Oct 2019 12:00), Max: 36.8 (08 Oct 2019 14:00)  T(F): 97.6 (09 Oct 2019 12:00), Max: 98.2 (08 Oct 2019 14:00)  HR: 77 (09 Oct 2019 12:00) (77 - 83)  BP: 122/65 (09 Oct 2019 08:00) (91/52 - 123/58)  BP(mean): 88 (09 Oct 2019 08:00) (67 - 89)  ABP: 140/48 (09 Oct 2019 12:00) (58/55 - 140/48)  ABP(mean): 73 (09 Oct 2019 12:00) (53 - 127)  RR: 18 (09 Oct 2019 12:00) (14 - 25)  SpO2: 100% (09 Oct 2019 12:00) (93% - 100%)    I&O's Summary    08 Oct 2019 07:01  -  09 Oct 2019 07:00  --------------------------------------------------------  IN: 3427.5 mL / OUT: 977 mL / NET: 2450.5 mL    09 Oct 2019 07:01  -  09 Oct 2019 13:25  --------------------------------------------------------  IN: 257.2 mL / OUT: 301 mL / NET: -43.8 mL      I&O's Detail    08 Oct 2019 07:01  -  09 Oct 2019 07:00  --------------------------------------------------------  IN:    Albumin 5%  - 500 mL: 1250 mL    amiodarone Infusion: 16.6 mL    insulin regular Infusion: 54 mL    IV PiggyBack: 200 mL    milrinone  Infusion: 153.6 mL    norepinephrine Infusion: 45.7 mL    Oral Fluid: 430 mL    Packed Red Blood Cells: 600 mL    pantoprazole Infusion: 140 mL    sodium chloride 0.9%.: 480 mL    vasopressin Infusion: 57.6 mL  Total IN: 3427.5 mL    OUT:    Chest Tube: 320 mL    Chest Tube: 40 mL    Indwelling Catheter - Urethral: 617 mL  Total OUT: 977 mL    Total NET: 2450.5 mL      09 Oct 2019 07:01  -  09 Oct 2019 13:25  --------------------------------------------------------  IN:    milrinone  Infusion: 44.8 mL    pantoprazole Infusion: 70 mL    sodium chloride 0.9%.: 140 mL    vasopressin Infusion: 2.4 mL  Total IN: 257.2 mL    OUT:    Indwelling Catheter - Urethral: 301 mL  Total OUT: 301 mL    Total NET: -43.8 mL          CAPILLARY BLOOD GLUCOSE  175 (08 Oct 2019 19:45)      POCT Blood Glucose.: 166 mg/dL (09 Oct 2019 11:34)  POCT Blood Glucose.: 143 mg/dL (09 Oct 2019 06:35)  POCT Blood Glucose.: 132 mg/dL (09 Oct 2019 04:43)  POCT Blood Glucose.: 100 mg/dL (09 Oct 2019 02:23)  POCT Blood Glucose.: 57 mg/dL (09 Oct 2019 01:50)  POCT Blood Glucose.: 147 mg/dL (08 Oct 2019 20:58)  POCT Blood Glucose.: 178 mg/dL (08 Oct 2019 19:45)  POCT Blood Glucose.: 204 mg/dL (08 Oct 2019 18:50)  POCT Blood Glucose.: 231 mg/dL (08 Oct 2019 17:54)  POCT Blood Glucose.: 230 mg/dL (08 Oct 2019 16:24)    LABS:  ABG - ( 09 Oct 2019 03:30 )  pH, Arterial: 7.35  pH, Blood: x     /  pCO2: 39    /  pO2: 58    / HCO3: 21    / Base Excess: -4.0  /  SaO2: 91                                      10.6   14.66 )-----------( 167      ( 09 Oct 2019 08:45 )             31.7     10-09    142  |  110  |  45<H>  ----------------------------<  56<L>  4.4   |  19  |  1.1    Ca    9.0      09 Oct 2019 01:50  Mg     3.1     10-09    TPro  5.3<L>  /  Alb  4.1  /  TBili  0.4  /  DBili  x   /  AST  145<H>  /  ALT  83<H>  /  AlkPhos  353<H>  10-09    PT/INR - ( 09 Oct 2019 01:50 )   PT: 12.20 sec;   INR: 1.06 ratio         PTT - ( 09 Oct 2019 01:50 )  PTT:35.0 sec      Home Medications:  aspirin 81 mg oral tablet: 1 tab(s) orally once a day (29 Aug 2019 20:19)    HOSPITAL MEDICATIONS:  MEDICATIONS  (STANDING):  ALBUTerol/ipratropium for Nebulization 3 milliLiter(s) Nebulizer every 6 hours  atorvastatin 40 milliGRAM(s) Oral at bedtime  chlorhexidine 4% Liquid 1 Application(s) Topical <User Schedule>  dextrose 40% Gel 15 Gram(s) Oral once  dextrose 5%. 1000 milliLiter(s) (50 mL/Hr) IV Continuous <Continuous>  dextrose 50% Injectable 50 milliLiter(s) IV Push every 15 minutes  dextrose 50% Injectable 25 milliLiter(s) IV Push every 15 minutes  guaiFENesin  milliGRAM(s) Oral every 12 hours  insulin glargine Injectable (LANTUS) 30 Unit(s) SubCutaneous every morning  insulin lispro Injectable (HumaLOG) 10 Unit(s) SubCutaneous three times a day before meals  insulin regular Infusion 1 Unit(s)/Hr (1 mL/Hr) IV Continuous <Continuous>  magnesium sulfate  IVPB 1 Gram(s) IV Intermittent daily  milrinone Infusion 0.5 MICROgram(s)/kG/Min (8.58 mL/Hr) IV Continuous <Continuous>  pantoprazole Infusion 8 mG/Hr (10 mL/Hr) IV Continuous <Continuous>  sodium chloride 0.9%. 1000 milliLiter(s) (10 mL/Hr) IV Continuous <Continuous>  traMADol 25 milliGRAM(s) Oral once    MEDICATIONS  (PRN):  dextrose 40% Gel 15 Gram(s) Oral once PRN Blood Glucose LESS THAN 70 milliGRAM(s)/deciliter  glucagon  Injectable 1 milliGRAM(s) IntraMuscular once PRN Glucose LESS THAN 70 milligrams/deciliter  ondansetron  IVPB 4 milliGRAM(s) IV Intermittent once PRN Nausea and/or Vomiting  traMADol 25 milliGRAM(s) Oral every 4 hours PRN Mild Pain (1 - 3)  traMADol 50 milliGRAM(s) Oral every 4 hours PRN Moderate Pain (4 - 6)      REVIEW OF SYSTEMS:  CONSTITUTIONAL: [X] all negative; [ ] weakness, [ ] fevers, [ ] chills  EYES/ENT: [X] all negative; [ ] visual changes, [ ] vertigo, [ ] throat pain   NECK: [X] all negative; [ ] pain, [ ] stiffness  RESPIRATORY: [] all negative, [ ] cough, [ ] wheezing, [ ] hemoptysis, [ ] shortness of breath  CARDIOVASCULAR: [] all negative; [ ] chest pain, [ ] palpitations, [ ] orthopnea  GASTROINTESTINAL: [X] all negative; [ ]abdominal pain, [ ] nausea, [ ] vomiting, [ ] hematemesis, [ ] diarrhea, [ ] constipation, [ ] melena, [ ] hematochezia.  GENITOURINARY: [X] all negative; [ ] dysuria, [ ] frequency, [ ] hematuria  NEUROLOGICAL: [X] all negative; [ ] numbness, [ ] weakness  SKIN: [X] all negative; [ ] itching, [ ] burning, [ ] rashes, [ ] lesions   All other review of systems is negative unless indicated above.    [  ] Unable to assess ROS because     PHYSICAL EXAM:          CONSTITUTIONAL: Well-developed; well-nourished; in no acute distress.   	SKIN: warm, dry  	HEAD: Normocephalic; atraumatic.  	EYES: PERRL, EOM, no conj injection, sclera clear  	ENT: No nasal discharge; airway clear.  	NECK: Supple; non tender.  No midline ttp ctls  	CARD: S1, S2 normal; no murmurs, gallops, or rubs. Regular rate and rhythm. 2+ RPs and DPs bilat, no carotid bruits, no pedal   edema, no calf pain b/l  	RESP: CTA  bilat good air movement No wheezes, rales or rhonchi.  	ABD: Soft, not tender, not distended, no CVA ttp no rebound or guarding, bowel sounds present  	EXT: Normal ROM.  No clubbing, cyanosis or edema.   	  	NEURO: Alert, awake, motor 5/5 R, 5/5 L        RADIOLOGY:  xray

## 2019-10-10 LAB
ALBUMIN SERPL ELPH-MCNC: 3.5 G/DL — SIGNIFICANT CHANGE UP (ref 3.5–5.2)
ALP SERPL-CCNC: 250 U/L — HIGH (ref 30–115)
ALT FLD-CCNC: 42 U/L — HIGH (ref 0–41)
ANION GAP SERPL CALC-SCNC: 12 MMOL/L — SIGNIFICANT CHANGE UP (ref 7–14)
AST SERPL-CCNC: 33 U/L — SIGNIFICANT CHANGE UP (ref 0–41)
BASOPHILS # BLD AUTO: 0.01 K/UL — SIGNIFICANT CHANGE UP (ref 0–0.2)
BASOPHILS NFR BLD AUTO: 0.1 % — SIGNIFICANT CHANGE UP (ref 0–1)
BILIRUB SERPL-MCNC: 0.6 MG/DL — SIGNIFICANT CHANGE UP (ref 0.2–1.2)
BUN SERPL-MCNC: 48 MG/DL — HIGH (ref 10–20)
CALCIUM SERPL-MCNC: 9.1 MG/DL — SIGNIFICANT CHANGE UP (ref 8.5–10.1)
CHLORIDE SERPL-SCNC: 107 MMOL/L — SIGNIFICANT CHANGE UP (ref 98–110)
CO2 SERPL-SCNC: 19 MMOL/L — SIGNIFICANT CHANGE UP (ref 17–32)
CREAT SERPL-MCNC: 1.1 MG/DL — SIGNIFICANT CHANGE UP (ref 0.7–1.5)
EOSINOPHIL # BLD AUTO: 0.09 K/UL — SIGNIFICANT CHANGE UP (ref 0–0.7)
EOSINOPHIL NFR BLD AUTO: 0.7 % — SIGNIFICANT CHANGE UP (ref 0–8)
GAS PNL BLDA: SIGNIFICANT CHANGE UP
GLUCOSE BLDC GLUCOMTR-MCNC: 158 MG/DL — HIGH (ref 70–99)
GLUCOSE BLDC GLUCOMTR-MCNC: 177 MG/DL — HIGH (ref 70–99)
GLUCOSE BLDC GLUCOMTR-MCNC: 202 MG/DL — HIGH (ref 70–99)
GLUCOSE BLDC GLUCOMTR-MCNC: 86 MG/DL — SIGNIFICANT CHANGE UP (ref 70–99)
GLUCOSE SERPL-MCNC: 173 MG/DL — HIGH (ref 70–99)
HCT VFR BLD CALC: 30.1 % — LOW (ref 37–47)
HCT VFR BLD CALC: 31.1 % — LOW (ref 37–47)
HGB BLD-MCNC: 10.1 G/DL — LOW (ref 12–16)
HGB BLD-MCNC: 9.9 G/DL — LOW (ref 12–16)
IMM GRANULOCYTES NFR BLD AUTO: 0.5 % — HIGH (ref 0.1–0.3)
LYMPHOCYTES # BLD AUTO: 0.83 K/UL — LOW (ref 1.2–3.4)
LYMPHOCYTES # BLD AUTO: 6.4 % — LOW (ref 20.5–51.1)
MAGNESIUM SERPL-MCNC: 2.7 MG/DL — HIGH (ref 1.8–2.4)
MCHC RBC-ENTMCNC: 28 PG — SIGNIFICANT CHANGE UP (ref 27–31)
MCHC RBC-ENTMCNC: 28 PG — SIGNIFICANT CHANGE UP (ref 27–31)
MCHC RBC-ENTMCNC: 32.5 G/DL — SIGNIFICANT CHANGE UP (ref 32–37)
MCHC RBC-ENTMCNC: 32.9 G/DL — SIGNIFICANT CHANGE UP (ref 32–37)
MCV RBC AUTO: 85 FL — SIGNIFICANT CHANGE UP (ref 81–99)
MCV RBC AUTO: 86.1 FL — SIGNIFICANT CHANGE UP (ref 81–99)
MONOCYTES # BLD AUTO: 0.99 K/UL — HIGH (ref 0.1–0.6)
MONOCYTES NFR BLD AUTO: 7.6 % — SIGNIFICANT CHANGE UP (ref 1.7–9.3)
NEUTROPHILS # BLD AUTO: 11.07 K/UL — HIGH (ref 1.4–6.5)
NEUTROPHILS NFR BLD AUTO: 84.7 % — HIGH (ref 42.2–75.2)
NRBC # BLD: 0 /100 WBCS — SIGNIFICANT CHANGE UP (ref 0–0)
NRBC # BLD: 0 /100 WBCS — SIGNIFICANT CHANGE UP (ref 0–0)
PLATELET # BLD AUTO: 175 K/UL — SIGNIFICANT CHANGE UP (ref 130–400)
PLATELET # BLD AUTO: 181 K/UL — SIGNIFICANT CHANGE UP (ref 130–400)
POTASSIUM SERPL-MCNC: 4.6 MMOL/L — SIGNIFICANT CHANGE UP (ref 3.5–5)
POTASSIUM SERPL-SCNC: 4.6 MMOL/L — SIGNIFICANT CHANGE UP (ref 3.5–5)
PROT SERPL-MCNC: 5 G/DL — LOW (ref 6–8)
RBC # BLD: 3.54 M/UL — LOW (ref 4.2–5.4)
RBC # BLD: 3.61 M/UL — LOW (ref 4.2–5.4)
RBC # FLD: 15.5 % — HIGH (ref 11.5–14.5)
RBC # FLD: 15.6 % — HIGH (ref 11.5–14.5)
SODIUM SERPL-SCNC: 138 MMOL/L — SIGNIFICANT CHANGE UP (ref 135–146)
WBC # BLD: 11.28 K/UL — HIGH (ref 4.8–10.8)
WBC # BLD: 13.06 K/UL — HIGH (ref 4.8–10.8)
WBC # FLD AUTO: 11.28 K/UL — HIGH (ref 4.8–10.8)
WBC # FLD AUTO: 13.06 K/UL — HIGH (ref 4.8–10.8)

## 2019-10-10 PROCEDURE — 71045 X-RAY EXAM CHEST 1 VIEW: CPT | Mod: 26

## 2019-10-10 PROCEDURE — 99291 CRITICAL CARE FIRST HOUR: CPT

## 2019-10-10 RX ORDER — PANTOPRAZOLE SODIUM 20 MG/1
40 TABLET, DELAYED RELEASE ORAL EVERY 12 HOURS
Refills: 0 | Status: DISCONTINUED | OUTPATIENT
Start: 2019-10-10 | End: 2019-10-12

## 2019-10-10 RX ORDER — TAMSULOSIN HYDROCHLORIDE 0.4 MG/1
0.4 CAPSULE ORAL AT BEDTIME
Refills: 0 | Status: DISCONTINUED | OUTPATIENT
Start: 2019-10-10 | End: 2019-10-18

## 2019-10-10 RX ORDER — AMIODARONE HYDROCHLORIDE 400 MG/1
200 TABLET ORAL DAILY
Refills: 0 | Status: DISCONTINUED | OUTPATIENT
Start: 2019-10-11 | End: 2019-10-11

## 2019-10-10 RX ORDER — HEPARIN SODIUM 5000 [USP'U]/ML
5000 INJECTION INTRAVENOUS; SUBCUTANEOUS EVERY 12 HOURS
Refills: 0 | Status: DISCONTINUED | OUTPATIENT
Start: 2019-10-10 | End: 2019-10-18

## 2019-10-10 RX ORDER — DILTIAZEM HCL 120 MG
180 CAPSULE, EXT RELEASE 24 HR ORAL DAILY
Refills: 0 | Status: DISCONTINUED | OUTPATIENT
Start: 2019-10-10 | End: 2019-10-10

## 2019-10-10 RX ORDER — FUROSEMIDE 40 MG
40 TABLET ORAL ONCE
Refills: 0 | Status: COMPLETED | OUTPATIENT
Start: 2019-10-10 | End: 2019-10-10

## 2019-10-10 RX ORDER — ALPRAZOLAM 0.25 MG
0.5 TABLET ORAL DAILY
Refills: 0 | Status: DISCONTINUED | OUTPATIENT
Start: 2019-10-10 | End: 2019-10-12

## 2019-10-10 RX ORDER — AMIODARONE HYDROCHLORIDE 400 MG/1
1 TABLET ORAL
Qty: 900 | Refills: 0 | Status: DISCONTINUED | OUTPATIENT
Start: 2019-10-10 | End: 2019-10-10

## 2019-10-10 RX ORDER — AMIODARONE HYDROCHLORIDE 400 MG/1
150 TABLET ORAL ONCE
Refills: 0 | Status: COMPLETED | OUTPATIENT
Start: 2019-10-10 | End: 2019-10-10

## 2019-10-10 RX ORDER — SIMVASTATIN 20 MG/1
20 TABLET, FILM COATED ORAL AT BEDTIME
Refills: 0 | Status: DISCONTINUED | OUTPATIENT
Start: 2019-10-10 | End: 2019-10-10

## 2019-10-10 RX ORDER — FUROSEMIDE 40 MG
40 TABLET ORAL ONCE
Refills: 0 | Status: DISCONTINUED | OUTPATIENT
Start: 2019-10-10 | End: 2019-10-10

## 2019-10-10 RX ORDER — CARVEDILOL PHOSPHATE 80 MG/1
3.12 CAPSULE, EXTENDED RELEASE ORAL EVERY 12 HOURS
Refills: 0 | Status: DISCONTINUED | OUTPATIENT
Start: 2019-10-10 | End: 2019-10-15

## 2019-10-10 RX ORDER — BACLOFEN 100 %
20 POWDER (GRAM) MISCELLANEOUS EVERY 12 HOURS
Refills: 0 | Status: DISCONTINUED | OUTPATIENT
Start: 2019-10-10 | End: 2019-10-12

## 2019-10-10 RX ORDER — IPRATROPIUM BROMIDE 0.2 MG/ML
500 SOLUTION, NON-ORAL INHALATION EVERY 6 HOURS
Refills: 0 | Status: DISCONTINUED | OUTPATIENT
Start: 2019-10-10 | End: 2019-10-17

## 2019-10-10 RX ORDER — MILRINONE LACTATE 1 MG/ML
0.25 INJECTION, SOLUTION INTRAVENOUS
Qty: 20 | Refills: 0 | Status: DISCONTINUED | OUTPATIENT
Start: 2019-10-10 | End: 2019-10-11

## 2019-10-10 RX ORDER — TIOTROPIUM BROMIDE 18 UG/1
1 CAPSULE ORAL; RESPIRATORY (INHALATION) DAILY
Refills: 0 | Status: DISCONTINUED | OUTPATIENT
Start: 2019-10-10 | End: 2019-10-18

## 2019-10-10 RX ORDER — FUROSEMIDE 40 MG
40 TABLET ORAL DAILY
Refills: 0 | Status: DISCONTINUED | OUTPATIENT
Start: 2019-10-10 | End: 2019-10-10

## 2019-10-10 RX ADMIN — Medication 600 MILLIGRAM(S): at 17:27

## 2019-10-10 RX ADMIN — Medication 600 MILLIGRAM(S): at 06:10

## 2019-10-10 RX ADMIN — ATORVASTATIN CALCIUM 40 MILLIGRAM(S): 80 TABLET, FILM COATED ORAL at 21:32

## 2019-10-10 RX ADMIN — TAMSULOSIN HYDROCHLORIDE 0.4 MILLIGRAM(S): 0.4 CAPSULE ORAL at 21:33

## 2019-10-10 RX ADMIN — TRAMADOL HYDROCHLORIDE 50 MILLIGRAM(S): 50 TABLET ORAL at 14:45

## 2019-10-10 RX ADMIN — CHLORHEXIDINE GLUCONATE 1 APPLICATION(S): 213 SOLUTION TOPICAL at 06:05

## 2019-10-10 RX ADMIN — Medication 0.5 MILLIGRAM(S): at 11:42

## 2019-10-10 RX ADMIN — AMIODARONE HYDROCHLORIDE 618 MILLIGRAM(S): 400 TABLET ORAL at 09:30

## 2019-10-10 RX ADMIN — Medication 100 GRAM(S): at 11:13

## 2019-10-10 RX ADMIN — AMIODARONE HYDROCHLORIDE 33.33 MG/MIN: 400 TABLET ORAL at 10:10

## 2019-10-10 RX ADMIN — Medication 10 UNIT(S): at 16:29

## 2019-10-10 RX ADMIN — HEPARIN SODIUM 5000 UNIT(S): 5000 INJECTION INTRAVENOUS; SUBCUTANEOUS at 17:26

## 2019-10-10 RX ADMIN — PANTOPRAZOLE SODIUM 40 MILLIGRAM(S): 20 TABLET, DELAYED RELEASE ORAL at 17:26

## 2019-10-10 RX ADMIN — Medication 40 MILLIGRAM(S): at 11:41

## 2019-10-10 RX ADMIN — TRAMADOL HYDROCHLORIDE 50 MILLIGRAM(S): 50 TABLET ORAL at 15:15

## 2019-10-10 RX ADMIN — PANTOPRAZOLE SODIUM 40 MILLIGRAM(S): 20 TABLET, DELAYED RELEASE ORAL at 06:13

## 2019-10-10 RX ADMIN — CARVEDILOL PHOSPHATE 3.12 MILLIGRAM(S): 80 CAPSULE, EXTENDED RELEASE ORAL at 12:25

## 2019-10-10 RX ADMIN — Medication 10 UNIT(S): at 07:23

## 2019-10-10 RX ADMIN — INSULIN GLARGINE 30 UNIT(S): 100 INJECTION, SOLUTION SUBCUTANEOUS at 07:23

## 2019-10-10 RX ADMIN — Medication 10 UNIT(S): at 11:13

## 2019-10-10 RX ADMIN — Medication 20 MILLIGRAM(S): at 23:10

## 2019-10-10 NOTE — PROGRESS NOTE ADULT - SUBJECTIVE AND OBJECTIVE BOX
CTU Attending Progress Daily Note     10 Oct 2019 11:20  POD# -   He has history of Female bladder prolapse  Diabetes    Interval event for past 24 hr:  GUS WILBURN  68y had no event.   Current Complains:  GUS WILBURN has no new complains  HPI:  68F with PMHx of uncontrolled DM (non-compliant), viral myopathy, Paroxysmal Afib, recent labial abscess s/p ID, hip fracture s/p fixation, recent LGIB bleed now presents for shortness of breath. Patient has been having shortness of breath for the last day. Occurs on exertion and stationary however no worsening of breathing when laying down. Patient denied abdominal pain however family reports she has been having some abdominal discomfort over the last day. Patient also has an indwelling montejo for the last 3 weeks for urinary retention. Reports cough with no sputum production. Denies chest pain, fever, nausea/vomiting, diarrhea. (21 Sep 2019 21:52)    OBJECTIVE:  ICU Vital Signs Last 24 Hrs  T(C): 36.2 (10 Oct 2019 08:00), Max: 36.8 (10 Oct 2019 01:00)  T(F): 97.1 (10 Oct 2019 08:00), Max: 98.2 (10 Oct 2019 01:00)  HR: 88 (10 Oct 2019 11:00) (77 - 98)  BP: 103/57 (09 Oct 2019 21:12) (101/52 - 121/66)  BP(mean): 76 (09 Oct 2019 21:12) (74 - 89)  ABP: 134/58 (10 Oct 2019 11:00) (99/46 - 140/48)  ABP(mean): 77 (10 Oct 2019 11:00) (56 - 77)  RR: 22 (10 Oct 2019 11:00) (13 - 30)  SpO2: 95% (10 Oct 2019 11:00) (93% - 100%)    I&O's Summary    09 Oct 2019 07:01  -  10 Oct 2019 07:00  --------------------------------------------------------  IN: 1426 mL / OUT: 901 mL / NET: 525 mL    10 Oct 2019 07:01  -  10 Oct 2019 11:20  --------------------------------------------------------  IN: 350.5 mL / OUT: 142 mL / NET: 208.5 mL      I&O's Detail    09 Oct 2019 07:01  -  10 Oct 2019 07:00  --------------------------------------------------------  IN:    milrinone  Infusion: 153.6 mL    Oral Fluid: 710 mL    pantoprazole Infusion: 80 mL    sodium chloride 0.9%.: 480 mL    vasopressin Infusion: 2.4 mL  Total IN: 1426 mL    OUT:    Indwelling Catheter - Urethral: 901 mL  Total OUT: 901 mL    Total NET: 525 mL      10 Oct 2019 07:01  -  10 Oct 2019 11:20  --------------------------------------------------------  IN:    amiodarone Infusion: 33.3 mL    IV PiggyBack: 100 mL    milrinone  Infusion: 17.2 mL    Oral Fluid: 120 mL    sodium chloride 0.9%.: 80 mL  Total IN: 350.5 mL    OUT:    Indwelling Catheter - Urethral: 142 mL  Total OUT: 142 mL    Total NET: 208.5 mL        Adult Advanced Hemodynamics Last 24 Hrs  CVP(mm Hg): --  CVP(cm H2O): --  CO: --  CI: --  PA: --  PA(mean): --  PCWP: --  SVR: --  SVRI: --  PVR: --  PVRI: --    CAPILLARY BLOOD GLUCOSE      POCT Blood Glucose.: 202 mg/dL (10 Oct 2019 11:06)  POCT Blood Glucose.: 177 mg/dL (10 Oct 2019 06:16)  POCT Blood Glucose.: 162 mg/dL (09 Oct 2019 22:12)  POCT Blood Glucose.: 219 mg/dL (09 Oct 2019 15:21)  POCT Blood Glucose.: 166 mg/dL (09 Oct 2019 11:34)    LABS:  ABG - ( 10 Oct 2019 04:24 )  pH, Arterial: 7.37  pH, Blood: x     /  pCO2: 34    /  pO2: 82    / HCO3: 20    / Base Excess: -4.9  /  SaO2: 97                                      9.9    13.06 )-----------( 175      ( 10 Oct 2019 02:50 )             30.1     10-10    138  |  107  |  48<H>  ----------------------------<  173<H>  4.6   |  19  |  1.1    Ca    9.1      10 Oct 2019 02:50  Mg     2.7     10-10    TPro  5.0<L>  /  Alb  3.5  /  TBili  0.6  /  DBili  x   /  AST  33  /  ALT  42<H>  /  AlkPhos  250<H>  10-10    PT/INR - ( 09 Oct 2019 01:50 )   PT: 12.20 sec;   INR: 1.06 ratio         PTT - ( 09 Oct 2019 01:50 )  PTT:35.0 sec      Home Medications:  aspirin 81 mg oral tablet: 1 tab(s) orally once a day (29 Aug 2019 20:19)    HOSPITAL MEDICATIONS:  MEDICATIONS  (STANDING):  ALBUTerol/ipratropium for Nebulization 3 milliLiter(s) Nebulizer every 6 hours  amiodarone Infusion 1 mG/Min (33.333 mL/Hr) IV Continuous <Continuous>  atorvastatin 40 milliGRAM(s) Oral at bedtime  chlorhexidine 4% Liquid 1 Application(s) Topical <User Schedule>  dextrose 40% Gel 15 Gram(s) Oral once  dextrose 5%. 1000 milliLiter(s) (50 mL/Hr) IV Continuous <Continuous>  dextrose 50% Injectable 50 milliLiter(s) IV Push every 15 minutes  dextrose 50% Injectable 25 milliLiter(s) IV Push every 15 minutes  guaiFENesin  milliGRAM(s) Oral every 12 hours  heparin  Injectable 5000 Unit(s) SubCutaneous every 12 hours  insulin glargine Injectable (LANTUS) 30 Unit(s) SubCutaneous every morning  insulin lispro Injectable (HumaLOG) 10 Unit(s) SubCutaneous three times a day before meals  insulin regular Infusion 1 Unit(s)/Hr (1 mL/Hr) IV Continuous <Continuous>  magnesium sulfate  IVPB 1 Gram(s) IV Intermittent daily  milrinone Infusion 0.5 MICROgram(s)/kG/Min (8.58 mL/Hr) IV Continuous <Continuous>  pantoprazole    Tablet 40 milliGRAM(s) Oral every 12 hours  simvastatin 20 milliGRAM(s) Oral at bedtime  sodium chloride 0.9%. 1000 milliLiter(s) (10 mL/Hr) IV Continuous <Continuous>    MEDICATIONS  (PRN):  dextrose 40% Gel 15 Gram(s) Oral once PRN Blood Glucose LESS THAN 70 milliGRAM(s)/deciliter  glucagon  Injectable 1 milliGRAM(s) IntraMuscular once PRN Glucose LESS THAN 70 milligrams/deciliter  ondansetron  IVPB 4 milliGRAM(s) IV Intermittent once PRN Nausea and/or Vomiting  traMADol 25 milliGRAM(s) Oral every 4 hours PRN Mild Pain (1 - 3)  traMADol 50 milliGRAM(s) Oral every 4 hours PRN Moderate Pain (4 - 6)      REVIEW OF SYSTEMS:  CONSTITUTIONAL: [X] all negative; [ ] weakness, [ ] fevers, [ ] chills  EYES/ENT: [X] all negative; [ ] visual changes, [ ] vertigo, [ ] throat pain   NECK: [X] all negative; [ ] pain, [ ] stiffness  RESPIRATORY: [] all negative, [ ] cough, [ ] wheezing, [ ] hemoptysis, [ ] shortness of breath  CARDIOVASCULAR: [] all negative; [ ] chest pain, [ ] palpitations, [ ] orthopnea  GASTROINTESTINAL: [X] all negative; [ ]abdominal pain, [ ] nausea, [ ] vomiting, [ ] hematemesis, [ ] diarrhea, [ ] constipation, [ ] melena, [ ] hematochezia.  GENITOURINARY: [X] all negative; [ ] dysuria, [ ] frequency, [ ] hematuria  NEUROLOGICAL: [X] all negative; [ ] numbness, [ ] weakness  SKIN: [X] all negative; [ ] itching, [ ] burning, [ ] rashes, [ ] lesions   All other review of systems is negative unless indicated above.    [  ] Unable to assess ROS because     PHYSICAL EXAM:          CONSTITUTIONAL: Well-developed; well-nourished; in no acute distress.   	SKIN: warm, dry  	HEAD: Normocephalic; atraumatic.  	EYES: PERRL, EOM, no conj injection, sclera clear  	ENT: No nasal discharge; airway clear.  	NECK: Supple; non tender.  No midline ttp ctls  	CARD: S1, S2 normal; no murmurs, gallops, or rubs. Regular rate and rhythm. 2+ RPs and DPs bilat, no carotid bruits, no pedal   edema, no calf pain b/l  	RESP: CTA  bilat good air movement No wheezes, rales or rhonchi.  	ABD: Soft, not tender, not distended, no CVA ttp no rebound or guarding, bowel sounds present  	EXT: Normal ROM.  No clubbing, cyanosis or edema.   	  	NEURO: Alert, awake, motor 5/5 R, 5/5 L        RADIOLOGY:  xray CTU Attending Progress Daily Note     10 Oct 2019 11:20  POD# - 3  He has history of Female bladder prolapse  Diabetes    Interval event for past 24 hr:  GUS WILBURN  68y had no event.   Current Complains:  GUS WILBURN has no new complains  HPI:  68F with PMHx of uncontrolled DM (non-compliant), viral myopathy, Paroxysmal Afib, recent labial abscess s/p ID, hip fracture s/p fixation, recent LGIB bleed now presents for shortness of breath. Patient has been having shortness of breath for the last day. Occurs on exertion and stationary however no worsening of breathing when laying down. Patient denied abdominal pain however family reports she has been having some abdominal discomfort over the last day. Patient also has an indwelling montejo for the last 3 weeks for urinary retention. Reports cough with no sputum production. Denies chest pain, fever, nausea/vomiting, diarrhea. (21 Sep 2019 21:52)    OBJECTIVE:  ICU Vital Signs Last 24 Hrs  T(C): 36.2 (10 Oct 2019 08:00), Max: 36.8 (10 Oct 2019 01:00)  T(F): 97.1 (10 Oct 2019 08:00), Max: 98.2 (10 Oct 2019 01:00)  HR: 88 (10 Oct 2019 11:00) (77 - 98)  BP: 103/57 (09 Oct 2019 21:12) (101/52 - 121/66)  BP(mean): 76 (09 Oct 2019 21:12) (74 - 89)  ABP: 134/58 (10 Oct 2019 11:00) (99/46 - 140/48)  ABP(mean): 77 (10 Oct 2019 11:00) (56 - 77)  RR: 22 (10 Oct 2019 11:00) (13 - 30)  SpO2: 95% (10 Oct 2019 11:00) (93% - 100%)    I&O's Summary    09 Oct 2019 07:01  -  10 Oct 2019 07:00  --------------------------------------------------------  IN: 1426 mL / OUT: 901 mL / NET: 525 mL    10 Oct 2019 07:01  -  10 Oct 2019 11:20  --------------------------------------------------------  IN: 350.5 mL / OUT: 142 mL / NET: 208.5 mL      I&O's Detail    09 Oct 2019 07:01  -  10 Oct 2019 07:00  --------------------------------------------------------  IN:    milrinone  Infusion: 153.6 mL    Oral Fluid: 710 mL    pantoprazole Infusion: 80 mL    sodium chloride 0.9%.: 480 mL    vasopressin Infusion: 2.4 mL  Total IN: 1426 mL    OUT:    Indwelling Catheter - Urethral: 901 mL  Total OUT: 901 mL    Total NET: 525 mL      10 Oct 2019 07:01  -  10 Oct 2019 11:20  --------------------------------------------------------  IN:    amiodarone Infusion: 33.3 mL    IV PiggyBack: 100 mL    milrinone  Infusion: 17.2 mL    Oral Fluid: 120 mL    sodium chloride 0.9%.: 80 mL  Total IN: 350.5 mL    OUT:    Indwelling Catheter - Urethral: 142 mL  Total OUT: 142 mL    Total NET: 208.5 mL        Adult Advanced Hemodynamics Last 24 Hrs  CVP(mm Hg): --  CVP(cm H2O): --  CO: --  CI: --  PA: --  PA(mean): --  PCWP: --  SVR: --  SVRI: --  PVR: --  PVRI: --    CAPILLARY BLOOD GLUCOSE      POCT Blood Glucose.: 202 mg/dL (10 Oct 2019 11:06)  POCT Blood Glucose.: 177 mg/dL (10 Oct 2019 06:16)  POCT Blood Glucose.: 162 mg/dL (09 Oct 2019 22:12)  POCT Blood Glucose.: 219 mg/dL (09 Oct 2019 15:21)  POCT Blood Glucose.: 166 mg/dL (09 Oct 2019 11:34)    LABS:  ABG - ( 10 Oct 2019 04:24 )  pH, Arterial: 7.37  pH, Blood: x     /  pCO2: 34    /  pO2: 82    / HCO3: 20    / Base Excess: -4.9  /  SaO2: 97                                      9.9    13.06 )-----------( 175      ( 10 Oct 2019 02:50 )             30.1     10-10    138  |  107  |  48<H>  ----------------------------<  173<H>  4.6   |  19  |  1.1    Ca    9.1      10 Oct 2019 02:50  Mg     2.7     10-10    TPro  5.0<L>  /  Alb  3.5  /  TBili  0.6  /  DBili  x   /  AST  33  /  ALT  42<H>  /  AlkPhos  250<H>  10-10    PT/INR - ( 09 Oct 2019 01:50 )   PT: 12.20 sec;   INR: 1.06 ratio         PTT - ( 09 Oct 2019 01:50 )  PTT:35.0 sec      Home Medications:  aspirin 81 mg oral tablet: 1 tab(s) orally once a day (29 Aug 2019 20:19)    HOSPITAL MEDICATIONS:  MEDICATIONS  (STANDING):  ALBUTerol/ipratropium for Nebulization 3 milliLiter(s) Nebulizer every 6 hours  amiodarone Infusion 1 mG/Min (33.333 mL/Hr) IV Continuous <Continuous>  atorvastatin 40 milliGRAM(s) Oral at bedtime  chlorhexidine 4% Liquid 1 Application(s) Topical <User Schedule>  dextrose 40% Gel 15 Gram(s) Oral once  dextrose 5%. 1000 milliLiter(s) (50 mL/Hr) IV Continuous <Continuous>  dextrose 50% Injectable 50 milliLiter(s) IV Push every 15 minutes  dextrose 50% Injectable 25 milliLiter(s) IV Push every 15 minutes  guaiFENesin  milliGRAM(s) Oral every 12 hours  heparin  Injectable 5000 Unit(s) SubCutaneous every 12 hours  insulin glargine Injectable (LANTUS) 30 Unit(s) SubCutaneous every morning  insulin lispro Injectable (HumaLOG) 10 Unit(s) SubCutaneous three times a day before meals  insulin regular Infusion 1 Unit(s)/Hr (1 mL/Hr) IV Continuous <Continuous>  magnesium sulfate  IVPB 1 Gram(s) IV Intermittent daily  milrinone Infusion 0.5 MICROgram(s)/kG/Min (8.58 mL/Hr) IV Continuous <Continuous>  pantoprazole    Tablet 40 milliGRAM(s) Oral every 12 hours  simvastatin 20 milliGRAM(s) Oral at bedtime  sodium chloride 0.9%. 1000 milliLiter(s) (10 mL/Hr) IV Continuous <Continuous>    MEDICATIONS  (PRN):  dextrose 40% Gel 15 Gram(s) Oral once PRN Blood Glucose LESS THAN 70 milliGRAM(s)/deciliter  glucagon  Injectable 1 milliGRAM(s) IntraMuscular once PRN Glucose LESS THAN 70 milligrams/deciliter  ondansetron  IVPB 4 milliGRAM(s) IV Intermittent once PRN Nausea and/or Vomiting  traMADol 25 milliGRAM(s) Oral every 4 hours PRN Mild Pain (1 - 3)  traMADol 50 milliGRAM(s) Oral every 4 hours PRN Moderate Pain (4 - 6)      REVIEW OF SYSTEMS:  CONSTITUTIONAL: [X] all negative; [ ] weakness, [ ] fevers, [ ] chills  EYES/ENT: [X] all negative; [ ] visual changes, [ ] vertigo, [ ] throat pain   NECK: [X] all negative; [ ] pain, [ ] stiffness  RESPIRATORY: [] all negative, [ ] cough, [ ] wheezing, [ ] hemoptysis, [ ] shortness of breath  CARDIOVASCULAR: [] all negative; [ ] chest pain, [ ] palpitations, [ ] orthopnea  GASTROINTESTINAL: [X] all negative; [ ]abdominal pain, [ ] nausea, [ ] vomiting, [ ] hematemesis, [ ] diarrhea, [ ] constipation, [ ] melena, [ ] hematochezia.  GENITOURINARY: [X] all negative; [ ] dysuria, [ ] frequency, [ ] hematuria  NEUROLOGICAL: [X] all negative; [ ] numbness, [ ] weakness  SKIN: [X] all negative; [ ] itching, [ ] burning, [ ] rashes, [ ] lesions   All other review of systems is negative unless indicated above.    [  ] Unable to assess ROS because     PHYSICAL EXAM:          CONSTITUTIONAL: Well-developed; well-nourished; in no acute distress.   	SKIN: warm, dry  	HEAD: Normocephalic; atraumatic.  	EYES: PERRL, EOM, no conj injection, sclera clear  	ENT: No nasal discharge; airway clear.  	NECK: Supple; non tender.  No midline ttp ctls  	CARD: S1, S2 normal; no murmurs, gallops, or rubs. Regular rate and rhythm. 2+ RPs and DPs bilat, no carotid bruits, no pedal   edema, no calf pain b/l  	RESP: CTA  bilat good air movement No wheezes, rales or rhonchi.  	ABD: Soft, not tender, not distended, no CVA ttp no rebound or guarding, bowel sounds present  	EXT: Normal ROM.  No clubbing, cyanosis or edema.   	  	NEURO: Alert, awake, motor 5/5 R, 5/5 L        RADIOLOGY:  xray

## 2019-10-10 NOTE — PROGRESS NOTE ADULT - ASSESSMENT
HPI:  68F with PMHx of uncontrolled DM (non-compliant), viral myopathy, Paroxysmal Afib, recent labial abscess s/p ID, hip fracture s/p fixation, recent LGIB bleed now presents for shortness of breath.     A/P   1- CABG POD# 3  2- hx A fib  back in afib resume amio drip then po  3- Acute /chronic systolic heart failure  4- Melena Gi bleed   no more   5- acute anemia of blood loss  6- hx  c diff no diarea today   fluid overload improved diuresed yestrday  Plan  -lower  milrinon start low dose B blockers    - anemia of acute blood loss      Gi bleed melena now no bleed       advance diet

## 2019-10-10 NOTE — CHART NOTE - NSCHARTNOTEFT_GEN_A_CORE
Registered Dietitian Follow-Up    ***Scroll to the bottom for RD recommendation***    Patient Profile Reviewed                           Yes [x]   No []  Nutrition History Previously Obtained        Yes [x]  No []  - pt is sitting on the chair alert and oriented, doing very well, appetite is back to baseline and is consuming 1x bottle of Glucerna shake brought by daughter.       PERTINENT MEDICAL INFORMATIONS:  (1) Hx of DM non compliant. a fib, myopathy, recent labial abscess s/p ID  (2) hip fx s/p fixation, recent LGIB now w/ SOB. day 2 s/p CABG.  (3) Acute / chronic systolic Heart Failure  (4) Fluid overload  (5) C/w lasix. d/c abx  (6) GI consulted to start clear liquids       PERTINENT SUBJECTIVE INFORMATION:  (1) see above      DIET ORDER:   CC no snack + DASH/TLC        ANTHROPOMETRICS:  - Ht.  160cm  - Wt.   (9/26): 58.7kg  (9/29): 58.7kg  (10/3): 57.3kg  (10/7): 57.2kg - appears stable at this time   - BMI. 22.3  - IBW       PERTINENT LAB DATA:  10/10: h/h 9.9/30.1, BUN 48, glucose 173, Mag 2.7  PERTINENT MEDS: abx, insulin, protonix, tramadol, atorvastatin, mag sul, ondansetron      PHYSICAL FINDINGS  - APPEARANCE:        alert and oriented. 1+ b/l ankle.  - GI FUNCTION:        LBM 2 days ago per pt  - TUBES:                       - ORAL/MOUTH:      none reported  - SKIN:                        surgical incision        NUTRITION REQUIREMENTS  WEIGHT USED:                          (from RD note on 9/23)  ABW of 58.7kg   ESTIMATED ENERGY NEEDS:       CONTINUE [ x ]      ADJUST [  ]    ESTIMATED ENERGY NEEDS:         1315-1425kcal (MSJ x 1.2-1.3 AF)  ESTIMATED PROTEIN NEEDS:        59-71g (1-1.2g/kg CBW) lean towards upper end post op  ESTIMATED FLUID NEEDS:             per CTS     CURRENT NUTRIENT NEEDS:    likely meeting          [ x ] PREVIOUS NUTRITION DIAGNOSIS:   (1) Inadequate protein energy intake            [  ] ONGOING        [ x ] RESOLVED        PATIENT INTERVENTION:    [  x] ORAL        [ ] EN/TF     GOAL/EXPECTED OUTCOME:     pt to consume and tolerate >75% of all meals and supplements through LOS.  INDICATOR/MONITORING:       RD to monitor diet order, energy intake, body composition, nutrition focused physical findings  NUTRITION INTERVENTION:        Meals and snacks. Medical food supplements    RECS: (1) Please add GLUCERNA SHAKE q24hr as snack and continue current diet order. RN is aware.

## 2019-10-10 NOTE — PROGRESS NOTE ADULT - SUBJECTIVE AND OBJECTIVE BOX
SUBJECTIVE ASSESSMENT:  pt feels stronger, very optimistic    Vital Signs Last 24 Hrs  T(C): 36.2 (10 Oct 2019 08:00), Max: 36.8 (10 Oct 2019 01:00)  T(F): 97.1 (10 Oct 2019 08:00), Max: 98.2 (10 Oct 2019 01:00)  HR: 92 (10 Oct 2019 08:00) (77 - 98)  BP: 103/57 (09 Oct 2019 21:12) (101/52 - 121/66)  BP(mean): 76 (09 Oct 2019 21:12) (74 - 89)  RR: 30 (10 Oct 2019 08:00) (13 - 30)  SpO2: 96% (10 Oct 2019 08:00) (94% - 100%)  10-09-19 @ 07:01  -  10-10-19 @ 07:00  --------------------------------------------------------  IN: 1426 mL / OUT: 901 mL / NET: 525 mL    A&OX3 in NAD  decreased bs at the bases bilaterally  sternum stable, no drainage  nl s1, s2  abd soft/NT/ND  no peripheral edema  SVG harvest site is healing well    LABS:                        9.9    13.06 )-----------( 175      ( 10 Oct 2019 02:50 )             30.1       PT/INR - ( 09 Oct 2019 01:50 )   PT: 12.20 sec;   INR: 1.06 ratio       PTT - ( 09 Oct 2019 01:50 )  PTT:35.0 sec  10-10    138  |  107  |  48<H>  ----------------------------<  173<H>  4.6   |  19  |  1.1    Ca    9.1      10 Oct 2019 02:50  Mg     2.7     10-10    TPro  5.0<L>  /  Alb  3.5  /  TBili  0.6  /  DBili  x   /  AST  33  /  ALT  42<H>  /  AlkPhos  250<H>  10-10    MEDICATIONS  (STANDING):  ALBUTerol/ipratropium for Nebulization 3 milliLiter(s) Nebulizer every 6 hours  atorvastatin 40 milliGRAM(s) Oral at bedtime  guaiFENesin  milliGRAM(s) Oral every 12 hours  insulin glargine Injectable (LANTUS) 30 Unit(s) SubCutaneous every morning  insulin lispro Injectable (HumaLOG) 10 Unit(s) SubCutaneous three times a day before meals  insulin regular Infusion 1 Unit(s)/Hr (1 mL/Hr) IV Continuous <Continuous>  magnesium sulfate  IVPB 1 Gram(s) IV Intermittent daily  milrinone Infusion 0.5 MICROgram(s)/kG/Min (8.58 mL/Hr) IV Continuous <Continuous>  pantoprazole    Tablet 40 milliGRAM(s) Oral every 12 hours    MEDICATIONS  (PRN):  dextrose 40% Gel 15 Gram(s) Oral once PRN Blood Glucose LESS THAN 70 milliGRAM(s)/deciliter  glucagon  Injectable 1 milliGRAM(s) IntraMuscular once PRN Glucose LESS THAN 70 milligrams/deciliter  ondansetron  IVPB 4 milliGRAM(s) IV Intermittent once PRN Nausea and/or Vomiting  traMADol 25 milliGRAM(s) Oral every 4 hours PRN Mild Pain (1 - 3)  traMADol 50 milliGRAM(s) Oral every 4 hours PRN Moderate Pain (4 - 6)

## 2019-10-10 NOTE — PROGRESS NOTE ADULT - ATTENDING COMMENTS
POD 3 after CABG/NUBIA clip c/b GI bleeding/diarrhea  otherwise is doing well    CV: wean milr to 0.25 today  cont atorvastatin  hold ASA for one more day b/c of GI bleeding    GI:  cont PPI bid  hold ASA for now  will start SQH q12    Resp: cont weaning O2    Renal:  bun/creat ratio is up  cont mild diuresis  CXR well aerated, no evidence of congestion    Heme:  Hct is stable at 30% after transfusion on Tuesday night  holding ASA  will start 5000U hep bid for DVT ppx    GYN/:  will ask Dr. Rodriguez's team if we can attempt trial of void on this admission    case d/w CTU team POD 3 after CABG/NUBIA clip c/b GI bleeding/diarrhea  otherwise is doing well    CV:   wean milr to 0.25 today  cont atorvastatin  hold ASA for one more day b/c of GI bleeding  cards f/u is appreciated  pt would need TTE after milr is weaned off and decision about life vest to be made based on EF postop    GI:  cont PPI bid  hold ASA for now  GI f/u is appreciated    Resp: cont weaning O2    Renal:  bun/creat ratio is up  cont mild diuresis  CXR well aerated, no evidence of congestion    Heme:  Hct is stable at 30% after transfusion on Tuesday night  holding ASA  will start 5000U hep bid for DVT ppx    GYN/:  will ask Dr. Rodriguez's team if we can attempt trial of void on this admission    ID:  cont PO vanco for resolved Cdiff  ID f/u is appreciated    case d/w CTU team

## 2019-10-10 NOTE — PROGRESS NOTE ADULT - SUBJECTIVE AND OBJECTIVE BOX
cc: dm  no cp  NAD  A/P: DM 2 uncontrolled-  - increase lantus 33 units/day  - increase humalog 11 units iwth meals + corrrection

## 2019-10-11 ENCOUNTER — TRANSCRIPTION ENCOUNTER (OUTPATIENT)
Age: 69
End: 2019-10-11

## 2019-10-11 LAB
ALBUMIN SERPL ELPH-MCNC: 3.5 G/DL — SIGNIFICANT CHANGE UP (ref 3.5–5.2)
ALP SERPL-CCNC: 214 U/L — HIGH (ref 30–115)
ALT FLD-CCNC: 29 U/L — SIGNIFICANT CHANGE UP (ref 0–41)
ANION GAP SERPL CALC-SCNC: 12 MMOL/L — SIGNIFICANT CHANGE UP (ref 7–14)
AST SERPL-CCNC: 16 U/L — SIGNIFICANT CHANGE UP (ref 0–41)
BASE EXCESS BLDA CALC-SCNC: -3.8 MMOL/L — LOW (ref -2–2)
BILIRUB DIRECT SERPL-MCNC: 0.2 MG/DL — SIGNIFICANT CHANGE UP (ref 0–0.2)
BILIRUB INDIRECT FLD-MCNC: 0.4 MG/DL — SIGNIFICANT CHANGE UP (ref 0.2–1.2)
BILIRUB SERPL-MCNC: 0.6 MG/DL — SIGNIFICANT CHANGE UP (ref 0.2–1.2)
BUN SERPL-MCNC: 51 MG/DL — HIGH (ref 10–20)
CALCIUM SERPL-MCNC: 9 MG/DL — SIGNIFICANT CHANGE UP (ref 8.5–10.1)
CHLORIDE SERPL-SCNC: 108 MMOL/L — SIGNIFICANT CHANGE UP (ref 98–110)
CO2 SERPL-SCNC: 19 MMOL/L — SIGNIFICANT CHANGE UP (ref 17–32)
CREAT SERPL-MCNC: 1 MG/DL — SIGNIFICANT CHANGE UP (ref 0.7–1.5)
GAS PNL BLDA: SIGNIFICANT CHANGE UP
GLUCOSE BLDC GLUCOMTR-MCNC: 111 MG/DL — HIGH (ref 70–99)
GLUCOSE BLDC GLUCOMTR-MCNC: 136 MG/DL — HIGH (ref 70–99)
GLUCOSE BLDC GLUCOMTR-MCNC: 168 MG/DL — HIGH (ref 70–99)
GLUCOSE BLDC GLUCOMTR-MCNC: 91 MG/DL — SIGNIFICANT CHANGE UP (ref 70–99)
GLUCOSE SERPL-MCNC: 95 MG/DL — SIGNIFICANT CHANGE UP (ref 70–99)
HCO3 BLDA-SCNC: 22 MMOL/L — LOW (ref 23–27)
HOROWITZ INDEX BLDA+IHG-RTO: 21 — SIGNIFICANT CHANGE UP
MAGNESIUM SERPL-MCNC: 2.8 MG/DL — HIGH (ref 1.8–2.4)
PCO2 BLDA: 39 MMHG — SIGNIFICANT CHANGE UP (ref 38–42)
PH BLDA: 7.35 — LOW (ref 7.38–7.42)
PO2 BLDA: 64 MMHG — LOW (ref 78–95)
POTASSIUM SERPL-MCNC: 4.4 MMOL/L — SIGNIFICANT CHANGE UP (ref 3.5–5)
POTASSIUM SERPL-SCNC: 4.4 MMOL/L — SIGNIFICANT CHANGE UP (ref 3.5–5)
PROT SERPL-MCNC: 5 G/DL — LOW (ref 6–8)
SAO2 % BLDA: 93 % — LOW (ref 94–98)
SODIUM SERPL-SCNC: 139 MMOL/L — SIGNIFICANT CHANGE UP (ref 135–146)

## 2019-10-11 PROCEDURE — 99291 CRITICAL CARE FIRST HOUR: CPT

## 2019-10-11 PROCEDURE — 93010 ELECTROCARDIOGRAM REPORT: CPT

## 2019-10-11 PROCEDURE — 71045 X-RAY EXAM CHEST 1 VIEW: CPT | Mod: 26

## 2019-10-11 RX ORDER — MILRINONE LACTATE 1 MG/ML
0.1 INJECTION, SOLUTION INTRAVENOUS
Qty: 20 | Refills: 0 | Status: DISCONTINUED | OUTPATIENT
Start: 2019-10-11 | End: 2019-10-11

## 2019-10-11 RX ORDER — AMIODARONE HYDROCHLORIDE 400 MG/1
150 TABLET ORAL ONCE
Refills: 0 | Status: COMPLETED | OUTPATIENT
Start: 2019-10-11 | End: 2019-10-11

## 2019-10-11 RX ORDER — ASPIRIN/CALCIUM CARB/MAGNESIUM 324 MG
81 TABLET ORAL DAILY
Refills: 0 | Status: DISCONTINUED | OUTPATIENT
Start: 2019-10-11 | End: 2019-10-18

## 2019-10-11 RX ORDER — AMIODARONE HYDROCHLORIDE 400 MG/1
200 TABLET ORAL DAILY
Refills: 0 | Status: DISCONTINUED | OUTPATIENT
Start: 2019-10-11 | End: 2019-10-12

## 2019-10-11 RX ORDER — INSULIN GLARGINE 100 [IU]/ML
32 INJECTION, SOLUTION SUBCUTANEOUS EVERY MORNING
Refills: 0 | Status: DISCONTINUED | OUTPATIENT
Start: 2019-10-12 | End: 2019-10-15

## 2019-10-11 RX ADMIN — TAMSULOSIN HYDROCHLORIDE 0.4 MILLIGRAM(S): 0.4 CAPSULE ORAL at 21:34

## 2019-10-11 RX ADMIN — Medication 10 UNIT(S): at 07:19

## 2019-10-11 RX ADMIN — Medication 10 UNIT(S): at 17:11

## 2019-10-11 RX ADMIN — CARVEDILOL PHOSPHATE 3.12 MILLIGRAM(S): 80 CAPSULE, EXTENDED RELEASE ORAL at 17:12

## 2019-10-11 RX ADMIN — AMIODARONE HYDROCHLORIDE 618 MILLIGRAM(S): 400 TABLET ORAL at 20:00

## 2019-10-11 RX ADMIN — HEPARIN SODIUM 5000 UNIT(S): 5000 INJECTION INTRAVENOUS; SUBCUTANEOUS at 17:11

## 2019-10-11 RX ADMIN — INSULIN GLARGINE 30 UNIT(S): 100 INJECTION, SOLUTION SUBCUTANEOUS at 07:32

## 2019-10-11 RX ADMIN — CHLORHEXIDINE GLUCONATE 1 APPLICATION(S): 213 SOLUTION TOPICAL at 05:02

## 2019-10-11 RX ADMIN — Medication 600 MILLIGRAM(S): at 05:02

## 2019-10-11 RX ADMIN — Medication 10 UNIT(S): at 11:30

## 2019-10-11 RX ADMIN — PANTOPRAZOLE SODIUM 40 MILLIGRAM(S): 20 TABLET, DELAYED RELEASE ORAL at 05:01

## 2019-10-11 RX ADMIN — Medication 100 GRAM(S): at 13:00

## 2019-10-11 RX ADMIN — HEPARIN SODIUM 5000 UNIT(S): 5000 INJECTION INTRAVENOUS; SUBCUTANEOUS at 05:01

## 2019-10-11 RX ADMIN — AMIODARONE HYDROCHLORIDE 200 MILLIGRAM(S): 400 TABLET ORAL at 05:02

## 2019-10-11 RX ADMIN — Medication 20 MILLIGRAM(S): at 06:41

## 2019-10-11 RX ADMIN — Medication 600 MILLIGRAM(S): at 17:12

## 2019-10-11 RX ADMIN — CARVEDILOL PHOSPHATE 3.12 MILLIGRAM(S): 80 CAPSULE, EXTENDED RELEASE ORAL at 05:02

## 2019-10-11 RX ADMIN — Medication 81 MILLIGRAM(S): at 11:30

## 2019-10-11 RX ADMIN — Medication 20 MILLIGRAM(S): at 17:12

## 2019-10-11 RX ADMIN — ATORVASTATIN CALCIUM 40 MILLIGRAM(S): 80 TABLET, FILM COATED ORAL at 21:34

## 2019-10-11 RX ADMIN — PANTOPRAZOLE SODIUM 40 MILLIGRAM(S): 20 TABLET, DELAYED RELEASE ORAL at 17:12

## 2019-10-11 RX ADMIN — Medication 0.5 MILLIGRAM(S): at 19:43

## 2019-10-11 NOTE — PROGRESS NOTE ADULT - SUBJECTIVE AND OBJECTIVE BOX
SUBJECTIVE ASSESSMENT:  pt has no major complaints  slept well yesterday    Vital Signs Last 24 Hrs  T(C): 36.8 (11 Oct 2019 08:00), Max: 36.8 (11 Oct 2019 08:00)  T(F): 98.2 (11 Oct 2019 08:00), Max: 98.2 (11 Oct 2019 08:00)  HR: 77 (11 Oct 2019 08:00) (67 - 99)  BP: 116/58 (10 Oct 2019 14:00) (116/58 - 116/58)  BP(mean): 84 (10 Oct 2019 14:00) (84 - 84)  RR: 18 (11 Oct 2019 08:00) (16 - 22)  SpO2: 100% (11 Oct 2019 08:00) (93% - 100%)  10-10-19 @ 07:01  -  10-11-19 @ 07:00  --------------------------------------------------------  IN: 1389.7 mL / OUT: 1180 mL / NET: 209.7 mL    A&OX3 in NAD  CTA bilat  sternum stable, no drainage  nl s1, s2  abd soft/NT/ND  no peripheral edema  SVG harvest site is healing well    LABS:                        10.1   11.28 )-----------( 181      ( 10 Oct 2019 23:31 )             31.1     10-10    139  |  108  |  51<H>  ----------------------------<  95  4.4   |  19  |  1.0    Ca    9.0      10 Oct 2019 23:31  Mg     2.8     10-10    TPro  5.0<L>  /  Alb  3.5  /  TBili  0.6  /  DBili  0.2  /  AST  16  /  ALT  29  /  AlkPhos  214<H>  10-10    MEDICATIONS  (STANDING):  atorvastatin 40 milliGRAM(s) Oral at bedtime  baclofen 20 milliGRAM(s) Oral every 12 hours  carvedilol 3.125 milliGRAM(s) Oral every 12 hours  guaiFENesin  milliGRAM(s) Oral every 12 hours  heparin  Injectable 5000 Unit(s) SubCutaneous every 12 hours  insulin lispro Injectable (HumaLOG) 10 Unit(s) SubCutaneous three times a day before meals  ipratropium    for Nebulization 500 MICROGram(s) Nebulizer every 6 hours  magnesium sulfate  IVPB 1 Gram(s) IV Intermittent daily  pantoprazole  Injectable 40 milliGRAM(s) IV Push every 12 hours  tamsulosin 0.4 milliGRAM(s) Oral at bedtime  tiotropium 18 MICROgram(s) Capsule 1 Capsule(s) Inhalation daily    MEDICATIONS  (PRN):  ALPRAZolam 0.5 milliGRAM(s) Oral daily PRN Anxiety  dextrose 40% Gel 15 Gram(s) Oral once PRN Blood Glucose LESS THAN 70 milliGRAM(s)/deciliter  glucagon  Injectable 1 milliGRAM(s) IntraMuscular once PRN Glucose LESS THAN 70 milligrams/deciliter  ondansetron  IVPB 4 milliGRAM(s) IV Intermittent once PRN Nausea and/or Vomiting  traMADol 50 milliGRAM(s) Oral every 4 hours PRN Moderate Pain (4 - 6)  traMADol 25 milliGRAM(s) Oral every 4 hours PRN Mild Pain (1 - 3)

## 2019-10-11 NOTE — PROGRESS NOTE ADULT - ATTENDING COMMENTS
POD 4 after CABG/NUBIA clip c/b GI bleeding/diarrhea  otherwise is doing well    CV:   wean milr to off today  cont atorvastatin  hold ASA for one more day b/c of GI bleeding (will talk to GI about safety of starting ASA)  cards f/u is appreciated  pt would need TTE after milr is weaned off and decision about life vest to be made based on EF postop    GI:  cont PPI bid  GI f/u is appreciated    Resp: cont weaning O2    Renal:  bun/creat ratio stable  hold diuresis today  CXR well aerated, no evidence of congestion    Heme:  Hct is stable at 30% after transfusion on Tuesday night  holding ASA  cont sqh 5000U bid for DVT ppx    GYN/:  as per Dr. Rodriguez's team pt started on baclofen and flomax in preparation for montejo removal in 48-72 hrs after initiation of this therapy    ID:  pt off PO vanco for resolved Cdiff    case d/w CTU team  possible d/c monday

## 2019-10-11 NOTE — PROGRESS NOTE ADULT - ASSESSMENT
HPI:  68F with PMHx of uncontrolled DM (non-compliant), viral myopathy, Paroxysmal Afib, recent labial abscess s/p ID, hip fracture s/p fixation, recent LGIB bleed now presents for shortness of breath.     A/P   1- CABG POD# 3  2- hx A fib  back in afib resume amio drip then po  3- Acute /chronic systolic heart failure  4- Melena Gi bleed   no more   5- acute anemia of blood loss  6- hx  c diff no diarea today   fluid overload improved diuresed yestrday  Plan  -lower  milrinon start low dose B blockers    - anemia of acute blood loss      Gi bleed melena now no bleed       advance diet HPI:  68F with PMHx of uncontrolled DM (non-compliant), viral myopathy, Paroxysmal Afib, recent labial abscess s/p ID, hip fracture s/p fixation, recent LGIB bleed now presents for shortness of breath.     A/P   1- CABG POD# 4  2- hx A fib  back in afib on /off now sinus keep off amio for now  3- Acute /chronic systolic heart failure  4- Melena Gi bleed   no more   5- acute anemia of blood loss  6- hx  c diff no diarea today  all resolved    fluid overload improved no diuresis today  Plan  -d/c  milrinon continue  low dose B blockers    - anemia of acute blood loss      Gi bleed melena now no bleed     -awaiting ok from Gi to start asa 81   advance diet

## 2019-10-11 NOTE — DISCHARGE NOTE NURSING/CASE MANAGEMENT/SOCIAL WORK - NSDCPEWEB_GEN_ALL_CORE
[facility] for [facility] website --- [email or web address] website --- [email or web address]

## 2019-10-11 NOTE — DISCHARGE NOTE NURSING/CASE MANAGEMENT/SOCIAL WORK - NSDCPEEMAIL_GEN_ALL_CORE
[facility] for Tobacco [facility] email [email or web address]

## 2019-10-11 NOTE — PROGRESS NOTE ADULT - SUBJECTIVE AND OBJECTIVE BOX
CTU Attending Progress Daily Note     11 Oct 2019 08:33  POD# -   He has history of Female bladder prolapse  Diabetes    Interval event for past 24 hr:  GUS WILBURN  68y had no event.   Current Complains:  GUS WILBURN has no new complains  HPI:  68F with PMHx of uncontrolled DM (non-compliant), viral myopathy, Paroxysmal Afib, recent labial abscess s/p ID, hip fracture s/p fixation, recent LGIB bleed now presents for shortness of breath. Patient has been having shortness of breath for the last day. Occurs on exertion and stationary however no worsening of breathing when laying down. Patient denied abdominal pain however family reports she has been having some abdominal discomfort over the last day. Patient also has an indwelling montejo for the last 3 weeks for urinary retention. Reports cough with no sputum production. Denies chest pain, fever, nausea/vomiting, diarrhea. (21 Sep 2019 21:52)    OBJECTIVE:  ICU Vital Signs Last 24 Hrs  T(C): 36.8 (11 Oct 2019 08:00), Max: 36.8 (11 Oct 2019 08:00)  T(F): 98.2 (11 Oct 2019 08:00), Max: 98.2 (11 Oct 2019 08:00)  HR: 77 (11 Oct 2019 08:00) (67 - 99)  BP: 116/58 (10 Oct 2019 14:00) (116/58 - 116/58)  BP(mean): 84 (10 Oct 2019 14:00) (84 - 84)  ABP: 127/44 (11 Oct 2019 08:00) (96/41 - 135/50)  ABP(mean): 64 (11 Oct 2019 08:00) (55 - 84)  RR: 18 (11 Oct 2019 08:00) (16 - 22)  SpO2: 100% (11 Oct 2019 08:00) (93% - 100%)    I&O's Summary    10 Oct 2019 07:01  -  11 Oct 2019 07:00  --------------------------------------------------------  IN: 1389.7 mL / OUT: 1180 mL / NET: 209.7 mL    11 Oct 2019 07:01  -  11 Oct 2019 08:33  --------------------------------------------------------  IN: 1.7 mL / OUT: 30 mL / NET: -28.3 mL      I&O's Detail    10 Oct 2019 07:01  -  11 Oct 2019 07:00  --------------------------------------------------------  IN:    amiodarone Infusion: 166.5 mL    IV PiggyBack: 100 mL    milrinone  Infusion: 86 mL    milrinone  Infusion: 17.2 mL    Oral Fluid: 540 mL    sodium chloride 0.9%.: 480 mL  Total IN: 1389.7 mL    OUT:    Indwelling Catheter - Urethral: 1180 mL  Total OUT: 1180 mL    Total NET: 209.7 mL      11 Oct 2019 07:01  -  11 Oct 2019 08:33  --------------------------------------------------------  IN:    milrinone  Infusion: 1.7 mL  Total IN: 1.7 mL    OUT:    Indwelling Catheter - Urethral: 30 mL  Total OUT: 30 mL    Total NET: -28.3 mL        Adult Advanced Hemodynamics Last 24 Hrs  CVP(mm Hg): --  CVP(cm H2O): --  CO: --  CI: --  PA: --  PA(mean): --  PCWP: --  SVR: --  SVRI: --  PVR: --  PVRI: --    CAPILLARY BLOOD GLUCOSE      POCT Blood Glucose.: 91 mg/dL (11 Oct 2019 06:21)  POCT Blood Glucose.: 86 mg/dL (10 Oct 2019 21:39)  POCT Blood Glucose.: 158 mg/dL (10 Oct 2019 16:22)  POCT Blood Glucose.: 202 mg/dL (10 Oct 2019 11:06)    LABS:  ABG - ( 11 Oct 2019 04:13 )  pH, Arterial: 7.35  pH, Blood: x     /  pCO2: 39    /  pO2: 64    / HCO3: 22    / Base Excess: -3.8  /  SaO2: 93                                      10.1   11.28 )-----------( 181      ( 10 Oct 2019 23:31 )             31.1     10-10    139  |  108  |  51<H>  ----------------------------<  95  4.4   |  19  |  1.0    Ca    9.0      10 Oct 2019 23:31  Mg     2.8     10-10    TPro  5.0<L>  /  Alb  3.5  /  TBili  0.6  /  DBili  0.2  /  AST  16  /  ALT  29  /  AlkPhos  214<H>  10-10          Home Medications:  aspirin 81 mg oral tablet: 1 tab(s) orally once a day (29 Aug 2019 20:19)    HOSPITAL MEDICATIONS:  MEDICATIONS  (STANDING):  atorvastatin 40 milliGRAM(s) Oral at bedtime  baclofen 20 milliGRAM(s) Oral every 12 hours  carvedilol 3.125 milliGRAM(s) Oral every 12 hours  chlorhexidine 4% Liquid 1 Application(s) Topical <User Schedule>  dextrose 40% Gel 15 Gram(s) Oral once  dextrose 5%. 1000 milliLiter(s) (50 mL/Hr) IV Continuous <Continuous>  dextrose 50% Injectable 50 milliLiter(s) IV Push every 15 minutes  dextrose 50% Injectable 25 milliLiter(s) IV Push every 15 minutes  guaiFENesin  milliGRAM(s) Oral every 12 hours  heparin  Injectable 5000 Unit(s) SubCutaneous every 12 hours  insulin lispro Injectable (HumaLOG) 10 Unit(s) SubCutaneous three times a day before meals  ipratropium    for Nebulization 500 MICROGram(s) Nebulizer every 6 hours  magnesium sulfate  IVPB 1 Gram(s) IV Intermittent daily  pantoprazole  Injectable 40 milliGRAM(s) IV Push every 12 hours  sodium chloride 0.9%. 1000 milliLiter(s) (10 mL/Hr) IV Continuous <Continuous>  tamsulosin 0.4 milliGRAM(s) Oral at bedtime  tiotropium 18 MICROgram(s) Capsule 1 Capsule(s) Inhalation daily    MEDICATIONS  (PRN):  ALPRAZolam 0.5 milliGRAM(s) Oral daily PRN Anxiety  dextrose 40% Gel 15 Gram(s) Oral once PRN Blood Glucose LESS THAN 70 milliGRAM(s)/deciliter  glucagon  Injectable 1 milliGRAM(s) IntraMuscular once PRN Glucose LESS THAN 70 milligrams/deciliter  ondansetron  IVPB 4 milliGRAM(s) IV Intermittent once PRN Nausea and/or Vomiting  traMADol 50 milliGRAM(s) Oral every 4 hours PRN Moderate Pain (4 - 6)  traMADol 25 milliGRAM(s) Oral every 4 hours PRN Mild Pain (1 - 3)      REVIEW OF SYSTEMS:  CONSTITUTIONAL: [X] all negative; [ ] weakness, [ ] fevers, [ ] chills  EYES/ENT: [X] all negative; [ ] visual changes, [ ] vertigo, [ ] throat pain   NECK: [X] all negative; [ ] pain, [ ] stiffness  RESPIRATORY: [] all negative, [ ] cough, [ ] wheezing, [ ] hemoptysis, [ ] shortness of breath  CARDIOVASCULAR: [] all negative; [ ] chest pain, [ ] palpitations, [ ] orthopnea  GASTROINTESTINAL: [X] all negative; [ ]abdominal pain, [ ] nausea, [ ] vomiting, [ ] hematemesis, [ ] diarrhea, [ ] constipation, [ ] melena, [ ] hematochezia.  GENITOURINARY: [X] all negative; [ ] dysuria, [ ] frequency, [ ] hematuria  NEUROLOGICAL: [X] all negative; [ ] numbness, [ ] weakness  SKIN: [X] all negative; [ ] itching, [ ] burning, [ ] rashes, [ ] lesions   All other review of systems is negative unless indicated above.    [  ] Unable to assess ROS because     PHYSICAL EXAM:          CONSTITUTIONAL: Well-developed; well-nourished; in no acute distress.   	SKIN: warm, dry  	HEAD: Normocephalic; atraumatic.  	EYES: PERRL, EOM, no conj injection, sclera clear  	ENT: No nasal discharge; airway clear.  	NECK: Supple; non tender.  No midline ttp ctls  	CARD: S1, S2 normal; no murmurs, gallops, or rubs. Regular rate and rhythm. 2+ RPs and DPs bilat, no carotid bruits, no pedal   edema, no calf pain b/l  	RESP: CTA  bilat good air movement No wheezes, rales or rhonchi.  	ABD: Soft, not tender, not distended, no CVA ttp no rebound or guarding, bowel sounds present  	EXT: Normal ROM.  No clubbing, cyanosis or edema.   	  	NEURO: Alert, awake, motor 5/5 R, 5/5 L        RADIOLOGY:  xray

## 2019-10-11 NOTE — DISCHARGE NOTE NURSING/CASE MANAGEMENT/SOCIAL WORK - PATIENT PORTAL LINK FT
You can access the FollowMyHealth Patient Portal offered by [facility] by registering at the following website: [email or web address]. By joining [email or web address] FollowMyHealth portal, you will also be able to view your health information using other applications (apps) compatible with our system.

## 2019-10-11 NOTE — PROGRESS NOTE ADULT - SUBJECTIVE AND OBJECTIVE BOX
cc: dm  no cp  FS trend reviewed  NAD  A/P: DM2  increase lantus 32 units per day  c/w humalog 10 units tid ac + correction

## 2019-10-12 LAB
ALBUMIN SERPL ELPH-MCNC: 3.6 G/DL — SIGNIFICANT CHANGE UP (ref 3.5–5.2)
ALP SERPL-CCNC: 173 U/L — HIGH (ref 30–115)
ALT FLD-CCNC: 16 U/L — SIGNIFICANT CHANGE UP (ref 0–41)
ANION GAP SERPL CALC-SCNC: 13 MMOL/L — SIGNIFICANT CHANGE UP (ref 7–14)
ANION GAP SERPL CALC-SCNC: 14 MMOL/L — SIGNIFICANT CHANGE UP (ref 7–14)
AST SERPL-CCNC: 12 U/L — SIGNIFICANT CHANGE UP (ref 0–41)
BILIRUB SERPL-MCNC: 0.7 MG/DL — SIGNIFICANT CHANGE UP (ref 0.2–1.2)
BUN SERPL-MCNC: 41 MG/DL — HIGH (ref 10–20)
BUN SERPL-MCNC: 46 MG/DL — HIGH (ref 10–20)
CALCIUM SERPL-MCNC: 9.2 MG/DL — SIGNIFICANT CHANGE UP (ref 8.5–10.1)
CALCIUM SERPL-MCNC: 9.2 MG/DL — SIGNIFICANT CHANGE UP (ref 8.5–10.1)
CHLORIDE SERPL-SCNC: 104 MMOL/L — SIGNIFICANT CHANGE UP (ref 98–110)
CHLORIDE SERPL-SCNC: 105 MMOL/L — SIGNIFICANT CHANGE UP (ref 98–110)
CO2 SERPL-SCNC: 18 MMOL/L — SIGNIFICANT CHANGE UP (ref 17–32)
CO2 SERPL-SCNC: 20 MMOL/L — SIGNIFICANT CHANGE UP (ref 17–32)
CREAT SERPL-MCNC: 0.8 MG/DL — SIGNIFICANT CHANGE UP (ref 0.7–1.5)
CREAT SERPL-MCNC: 0.9 MG/DL — SIGNIFICANT CHANGE UP (ref 0.7–1.5)
GAS PNL BLDA: SIGNIFICANT CHANGE UP
GLUCOSE BLDC GLUCOMTR-MCNC: 104 MG/DL — HIGH (ref 70–99)
GLUCOSE BLDC GLUCOMTR-MCNC: 133 MG/DL — HIGH (ref 70–99)
GLUCOSE BLDC GLUCOMTR-MCNC: 141 MG/DL — HIGH (ref 70–99)
GLUCOSE BLDC GLUCOMTR-MCNC: 170 MG/DL — HIGH (ref 70–99)
GLUCOSE SERPL-MCNC: 112 MG/DL — HIGH (ref 70–99)
GLUCOSE SERPL-MCNC: 135 MG/DL — HIGH (ref 70–99)
HCT VFR BLD CALC: 32.2 % — LOW (ref 37–47)
HGB BLD-MCNC: 10.3 G/DL — LOW (ref 12–16)
MAGNESIUM SERPL-MCNC: 2.6 MG/DL — HIGH (ref 1.8–2.4)
MCHC RBC-ENTMCNC: 27.5 PG — SIGNIFICANT CHANGE UP (ref 27–31)
MCHC RBC-ENTMCNC: 32 G/DL — SIGNIFICANT CHANGE UP (ref 32–37)
MCV RBC AUTO: 86.1 FL — SIGNIFICANT CHANGE UP (ref 81–99)
NRBC # BLD: 0 /100 WBCS — SIGNIFICANT CHANGE UP (ref 0–0)
PLATELET # BLD AUTO: 180 K/UL — SIGNIFICANT CHANGE UP (ref 130–400)
POTASSIUM SERPL-MCNC: 4.3 MMOL/L — SIGNIFICANT CHANGE UP (ref 3.5–5)
POTASSIUM SERPL-MCNC: 4.3 MMOL/L — SIGNIFICANT CHANGE UP (ref 3.5–5)
POTASSIUM SERPL-SCNC: 4.3 MMOL/L — SIGNIFICANT CHANGE UP (ref 3.5–5)
POTASSIUM SERPL-SCNC: 4.3 MMOL/L — SIGNIFICANT CHANGE UP (ref 3.5–5)
PROT SERPL-MCNC: 5.5 G/DL — LOW (ref 6–8)
RBC # BLD: 3.74 M/UL — LOW (ref 4.2–5.4)
RBC # FLD: 15.8 % — HIGH (ref 11.5–14.5)
SODIUM SERPL-SCNC: 135 MMOL/L — SIGNIFICANT CHANGE UP (ref 135–146)
SODIUM SERPL-SCNC: 139 MMOL/L — SIGNIFICANT CHANGE UP (ref 135–146)
WBC # BLD: 11.12 K/UL — HIGH (ref 4.8–10.8)
WBC # FLD AUTO: 11.12 K/UL — HIGH (ref 4.8–10.8)

## 2019-10-12 PROCEDURE — 99233 SBSQ HOSP IP/OBS HIGH 50: CPT

## 2019-10-12 PROCEDURE — 93010 ELECTROCARDIOGRAM REPORT: CPT

## 2019-10-12 PROCEDURE — 93306 TTE W/DOPPLER COMPLETE: CPT | Mod: 26

## 2019-10-12 PROCEDURE — 71045 X-RAY EXAM CHEST 1 VIEW: CPT | Mod: 26

## 2019-10-12 RX ORDER — DEXMEDETOMIDINE HYDROCHLORIDE IN 0.9% SODIUM CHLORIDE 4 UG/ML
0.3 INJECTION INTRAVENOUS
Qty: 200 | Refills: 0 | Status: DISCONTINUED | OUTPATIENT
Start: 2019-10-12 | End: 2019-10-14

## 2019-10-12 RX ORDER — LANOLIN ALCOHOL/MO/W.PET/CERES
3 CREAM (GRAM) TOPICAL AT BEDTIME
Refills: 0 | Status: DISCONTINUED | OUTPATIENT
Start: 2019-10-12 | End: 2019-10-15

## 2019-10-12 RX ORDER — PANTOPRAZOLE SODIUM 20 MG/1
40 TABLET, DELAYED RELEASE ORAL
Refills: 0 | Status: DISCONTINUED | OUTPATIENT
Start: 2019-10-12 | End: 2019-10-18

## 2019-10-12 RX ORDER — METOPROLOL TARTRATE 50 MG
5 TABLET ORAL ONCE
Refills: 0 | Status: COMPLETED | OUTPATIENT
Start: 2019-10-12 | End: 2019-10-12

## 2019-10-12 RX ORDER — FUROSEMIDE 40 MG
20 TABLET ORAL ONCE
Refills: 0 | Status: COMPLETED | OUTPATIENT
Start: 2019-10-12 | End: 2019-10-12

## 2019-10-12 RX ORDER — QUETIAPINE FUMARATE 200 MG/1
25 TABLET, FILM COATED ORAL AT BEDTIME
Refills: 0 | Status: DISCONTINUED | OUTPATIENT
Start: 2019-10-12 | End: 2019-10-18

## 2019-10-12 RX ORDER — SENNA PLUS 8.6 MG/1
1 TABLET ORAL ONCE
Refills: 0 | Status: COMPLETED | OUTPATIENT
Start: 2019-10-12 | End: 2019-10-12

## 2019-10-12 RX ORDER — FUROSEMIDE 40 MG
40 TABLET ORAL ONCE
Refills: 0 | Status: DISCONTINUED | OUTPATIENT
Start: 2019-10-12 | End: 2019-10-12

## 2019-10-12 RX ORDER — AMIODARONE HYDROCHLORIDE 400 MG/1
150 TABLET ORAL ONCE
Refills: 0 | Status: COMPLETED | OUTPATIENT
Start: 2019-10-12 | End: 2019-10-12

## 2019-10-12 RX ADMIN — Medication 10 UNIT(S): at 11:59

## 2019-10-12 RX ADMIN — TAMSULOSIN HYDROCHLORIDE 0.4 MILLIGRAM(S): 0.4 CAPSULE ORAL at 21:20

## 2019-10-12 RX ADMIN — Medication 20 MILLIGRAM(S): at 05:19

## 2019-10-12 RX ADMIN — QUETIAPINE FUMARATE 25 MILLIGRAM(S): 200 TABLET, FILM COATED ORAL at 23:02

## 2019-10-12 RX ADMIN — AMIODARONE HYDROCHLORIDE 200 MILLIGRAM(S): 400 TABLET ORAL at 05:20

## 2019-10-12 RX ADMIN — Medication 600 MILLIGRAM(S): at 17:11

## 2019-10-12 RX ADMIN — Medication 20 MILLIGRAM(S): at 08:57

## 2019-10-12 RX ADMIN — CARVEDILOL PHOSPHATE 3.12 MILLIGRAM(S): 80 CAPSULE, EXTENDED RELEASE ORAL at 17:38

## 2019-10-12 RX ADMIN — Medication 81 MILLIGRAM(S): at 11:59

## 2019-10-12 RX ADMIN — ATORVASTATIN CALCIUM 40 MILLIGRAM(S): 80 TABLET, FILM COATED ORAL at 21:22

## 2019-10-12 RX ADMIN — Medication 3 MILLIGRAM(S): at 21:22

## 2019-10-12 RX ADMIN — PANTOPRAZOLE SODIUM 40 MILLIGRAM(S): 20 TABLET, DELAYED RELEASE ORAL at 08:57

## 2019-10-12 RX ADMIN — Medication 5 MILLIGRAM(S): at 13:50

## 2019-10-12 RX ADMIN — Medication 100 GRAM(S): at 13:00

## 2019-10-12 RX ADMIN — INSULIN GLARGINE 32 UNIT(S): 100 INJECTION, SOLUTION SUBCUTANEOUS at 07:39

## 2019-10-12 RX ADMIN — CARVEDILOL PHOSPHATE 3.12 MILLIGRAM(S): 80 CAPSULE, EXTENDED RELEASE ORAL at 05:19

## 2019-10-12 RX ADMIN — Medication 10 UNIT(S): at 07:39

## 2019-10-12 RX ADMIN — SENNA PLUS 1 TABLET(S): 8.6 TABLET ORAL at 17:38

## 2019-10-12 RX ADMIN — HEPARIN SODIUM 5000 UNIT(S): 5000 INJECTION INTRAVENOUS; SUBCUTANEOUS at 17:11

## 2019-10-12 RX ADMIN — AMIODARONE HYDROCHLORIDE 618 MILLIGRAM(S): 400 TABLET ORAL at 02:30

## 2019-10-12 RX ADMIN — PANTOPRAZOLE SODIUM 40 MILLIGRAM(S): 20 TABLET, DELAYED RELEASE ORAL at 05:20

## 2019-10-12 RX ADMIN — Medication 600 MILLIGRAM(S): at 05:18

## 2019-10-12 RX ADMIN — Medication 10 MILLIGRAM(S): at 21:20

## 2019-10-12 RX ADMIN — Medication 10 UNIT(S): at 17:07

## 2019-10-12 RX ADMIN — HEPARIN SODIUM 5000 UNIT(S): 5000 INJECTION INTRAVENOUS; SUBCUTANEOUS at 05:21

## 2019-10-12 RX ADMIN — CHLORHEXIDINE GLUCONATE 1 APPLICATION(S): 213 SOLUTION TOPICAL at 07:11

## 2019-10-12 NOTE — PROGRESS NOTE ADULT - SUBJECTIVE AND OBJECTIVE BOX
69 yo F w uncontrolled DM (non-compliant), viral myopathy, Paroxysmal Afib, recent labial abscess s/p ID, hip fracture s/p fixation, recent LGIB bleed now presents for shortness of breath. Patient has been having shortness of breath for the last day. Occurs on exertion and stationary however no worsening of breathing when laying down. Patient denied abdominal pain however family reports she has been having some abdominal discomfort over the last day. Patient also has an indwelling Monetjo for the last 3 weeks for urinary retention. Reports cough with no sputum production. Denies chest pain, fever, nausea/vomiting, diarrhea. CC- Ischemic Cardiomyopathy, NSTEMI, Severe CAD with LM/TVD and strong fam history of CAD.    POD # 5 CABG x3, EF 20-25% and left atrial appendage clipping  Overnight with Poor mental status in setting of starting Xanax and Baclofen  Rate control A fib    Vital Signs Last 24 Hrs  T(C): 36.8 (12 Oct 2019 08:00), Max: 37.1 (11 Oct 2019 12:00)  T(F): 98.2 (12 Oct 2019 08:00), Max: 98.7 (11 Oct 2019 12:00)  HR: 72 (12 Oct 2019 08:00) (65 - 90)  BP: 123/52 (12 Oct 2019 08:00) (95/60 - 136/65)  BP(mean): 63 (12 Oct 2019 08:00) (63 - 94)  RR: 18 (12 Oct 2019 08:00) (16 - 22)  SpO2: 88% (12 Oct 2019 08:00) (88% - 100%)    alert, awake, verbal - easily falls asleep  follows commands appropriately  no JVD  Clean sternal incision  S1S2 irreg rate and rhythm  b/l air entry, no wheeze,   soft abdomen, non tender, non distended  warm periphery with + distal pulses, b/l pitting edema    7.415/32.7/67.4, LA 0.6 on r/a    CXR - b/l congestion, ? left effusion.    a/p:    CAD s/p CABG  Rate controlled A fib  Encephalopathy likely due to meds (xanax, baclofen)  Acute on Chr. Hypoxic Resp failure  Uterine prolopase    PO PPI, PO ASA 81 mg, Hep SC q8  Statin 40 mg QD, Coreg 3.125 bid  Lasix 40 mg IV x 1, repeat cmp at noon today  Lantus 32, Lispro 10  Stop Xanax and Baclofen  keep montejo, cont with tamsulosin - stage IV uterine prolapse as per GYN will need outpt follow up.  A fib is rate controlled with clipped left atrial appendage. No a/c at this time  OOB to chair  glycemic control, DVT proph  Discussed on AM rounds with CT surgery attending

## 2019-10-12 NOTE — PROGRESS NOTE ADULT - SUBJECTIVE AND OBJECTIVE BOX
SUBJECTIVE ASSESSMENT:  pt altered, nonfocal  answers questions appropriately, but wouldn't stay still    Vital Signs Last 24 Hrs  T(C): 36.2 (12 Oct 2019 04:00), Max: 37.1 (11 Oct 2019 12:00)  T(F): 97.2 (12 Oct 2019 04:00), Max: 98.7 (11 Oct 2019 12:00)  HR: 65 (12 Oct 2019 07:00) (65 - 90)  BP: 102/55 (12 Oct 2019 07:00) (95/60 - 136/65)  BP(mean): 76 (12 Oct 2019 07:00) (72 - 94)  RR: 18 (12 Oct 2019 07:00) (16 - 22)  SpO2: 100% (12 Oct 2019 07:00) (96% - 100%)  10-11-19 @ 07:01  -  10-12-19 @ 07:00  --------------------------------------------------------  IN: 921.7 mL / OUT: 810 mL / NET: 111.7 mL    A&OX3 in NAD  decreased bs at the bases  sternum stable, no drainage  nl s1, s2  abd soft/NT/ND  no peripheral edema  SVG harvest site is healing well    LABS:                        10.3   11.12 )-----------( 180      ( 11 Oct 2019 23:30 )             32.2     10-11    135  |  104  |  46<H>  ----------------------------<  135<H>  4.3   |  18  |  0.9    Ca    9.2      11 Oct 2019 23:30  Mg     2.6     10-11    TPro  5.0<L>  /  Alb  3.5  /  TBili  0.6  /  DBili  0.2  /  AST  16  /  ALT  29  /  AlkPhos  214<H>  10-10    MEDICATIONS  (STANDING):  amiodarone    Tablet 200 milliGRAM(s) Oral daily  aspirin enteric coated 81 milliGRAM(s) Oral daily  atorvastatin 40 milliGRAM(s) Oral at bedtime  baclofen 20 milliGRAM(s) Oral every 12 hours  carvedilol 3.125 milliGRAM(s) Oral every 12 hours  guaiFENesin  milliGRAM(s) Oral every 12 hours  heparin  Injectable 5000 Unit(s) SubCutaneous every 12 hours  insulin glargine Injectable (LANTUS) 32 Unit(s) SubCutaneous every morning  insulin lispro Injectable (HumaLOG) 10 Unit(s) SubCutaneous three times a day before meals  ipratropium    for Nebulization 500 MICROGram(s) Nebulizer every 6 hours  magnesium sulfate  IVPB 1 Gram(s) IV Intermittent daily  pantoprazole  Injectable 40 milliGRAM(s) IV Push every 12 hours  tamsulosin 0.4 milliGRAM(s) Oral at bedtime  tiotropium 18 MICROgram(s) Capsule 1 Capsule(s) Inhalation daily    MEDICATIONS  (PRN):  dextrose 40% Gel 15 Gram(s) Oral once PRN Blood Glucose LESS THAN 70 milliGRAM(s)/deciliter  glucagon  Injectable 1 milliGRAM(s) IntraMuscular once PRN Glucose LESS THAN 70 milligrams/deciliter  traMADol 50 milliGRAM(s) Oral every 4 hours PRN Moderate Pain (4 - 6)  traMADol 25 milliGRAM(s) Oral every 4 hours PRN Mild Pain (1 - 3)

## 2019-10-12 NOTE — PROGRESS NOTE ADULT - ATTENDING COMMENTS
POD 5 after CABG/NUBIA clip c/b GI bleeding/diarrhea  this morning restless, orientable, altered  reports not sleeping overnight    CV:   off milr  cont atorvastatin  cont coreg low dose  cont asa  TTE today off inotropes  cards f/u is appreciated    GI:  cont PPI bid (switching to PO)  GI f/u is appreciated    Resp: cont weaning O2  room air ABG this morning PO 2 of 67    Renal:  bun/creat ratio stable  diuresis today (even for 24hrs)  CXR well aerated, no evidence of congestion    Heme:  cont asa/sqh  we don't think anticoagulation for afib is safe at this point, as pt had GI bleeds and needs to be scoped prior to reinstitution of anticoagulation    GYN/:  as per Dr. Rodriguez's team pt started on baclofen and flomax in preparation for montejo removal in 48-72 hrs after initiation of this therapy    ID:  pt off PO vanco for resolved Cdiff    pt's confusion is attributed to baclofen  xanax small dose was given yesterday evening  will d/c baclofen  d/c amio (pt needs higher HR, effective HR is in 60s, in afib)

## 2019-10-13 DIAGNOSIS — I48.92 UNSPECIFIED ATRIAL FLUTTER: ICD-10-CM

## 2019-10-13 LAB
ALBUMIN SERPL ELPH-MCNC: 3.3 G/DL — LOW (ref 3.5–5.2)
ALP SERPL-CCNC: 137 U/L — HIGH (ref 30–115)
ALT FLD-CCNC: 10 U/L — SIGNIFICANT CHANGE UP (ref 0–41)
ANION GAP SERPL CALC-SCNC: 12 MMOL/L — SIGNIFICANT CHANGE UP (ref 7–14)
ANION GAP SERPL CALC-SCNC: 15 MMOL/L — HIGH (ref 7–14)
APPEARANCE UR: CLEAR — SIGNIFICANT CHANGE UP
AST SERPL-CCNC: 8 U/L — SIGNIFICANT CHANGE UP (ref 0–41)
BACTERIA # UR AUTO: NEGATIVE — SIGNIFICANT CHANGE UP
BILIRUB SERPL-MCNC: 0.5 MG/DL — SIGNIFICANT CHANGE UP (ref 0.2–1.2)
BILIRUB UR-MCNC: NEGATIVE — SIGNIFICANT CHANGE UP
BUN SERPL-MCNC: 41 MG/DL — HIGH (ref 10–20)
BUN SERPL-MCNC: 42 MG/DL — HIGH (ref 10–20)
CALCIUM SERPL-MCNC: 9.1 MG/DL — SIGNIFICANT CHANGE UP (ref 8.5–10.1)
CALCIUM SERPL-MCNC: 9.2 MG/DL — SIGNIFICANT CHANGE UP (ref 8.5–10.1)
CHLORIDE SERPL-SCNC: 105 MMOL/L — SIGNIFICANT CHANGE UP (ref 98–110)
CHLORIDE SERPL-SCNC: 108 MMOL/L — SIGNIFICANT CHANGE UP (ref 98–110)
CO2 SERPL-SCNC: 18 MMOL/L — SIGNIFICANT CHANGE UP (ref 17–32)
CO2 SERPL-SCNC: 24 MMOL/L — SIGNIFICANT CHANGE UP (ref 17–32)
COLOR SPEC: SIGNIFICANT CHANGE UP
CREAT SERPL-MCNC: 0.8 MG/DL — SIGNIFICANT CHANGE UP (ref 0.7–1.5)
CREAT SERPL-MCNC: 1 MG/DL — SIGNIFICANT CHANGE UP (ref 0.7–1.5)
DIFF PNL FLD: ABNORMAL
EPI CELLS # UR: 1 /HPF — SIGNIFICANT CHANGE UP (ref 0–5)
GLUCOSE BLDC GLUCOMTR-MCNC: 108 MG/DL — HIGH (ref 70–99)
GLUCOSE BLDC GLUCOMTR-MCNC: 140 MG/DL — HIGH (ref 70–99)
GLUCOSE BLDC GLUCOMTR-MCNC: 159 MG/DL — HIGH (ref 70–99)
GLUCOSE BLDC GLUCOMTR-MCNC: 167 MG/DL — HIGH (ref 70–99)
GLUCOSE SERPL-MCNC: 124 MG/DL — HIGH (ref 70–99)
GLUCOSE SERPL-MCNC: 183 MG/DL — HIGH (ref 70–99)
GLUCOSE UR QL: NEGATIVE — SIGNIFICANT CHANGE UP
HCT VFR BLD CALC: 30.1 % — LOW (ref 37–47)
HCT VFR BLD CALC: 32.5 % — LOW (ref 37–47)
HGB BLD-MCNC: 10.4 G/DL — LOW (ref 12–16)
HGB BLD-MCNC: 9.6 G/DL — LOW (ref 12–16)
HYALINE CASTS # UR AUTO: 7 /LPF — SIGNIFICANT CHANGE UP (ref 0–7)
KETONES UR-MCNC: NEGATIVE — SIGNIFICANT CHANGE UP
LEUKOCYTE ESTERASE UR-ACNC: ABNORMAL
MAGNESIUM SERPL-MCNC: 2.2 MG/DL — SIGNIFICANT CHANGE UP (ref 1.8–2.4)
MAGNESIUM SERPL-MCNC: 2.4 MG/DL — SIGNIFICANT CHANGE UP (ref 1.8–2.4)
MCHC RBC-ENTMCNC: 27.7 PG — SIGNIFICANT CHANGE UP (ref 27–31)
MCHC RBC-ENTMCNC: 28.1 PG — SIGNIFICANT CHANGE UP (ref 27–31)
MCHC RBC-ENTMCNC: 31.9 G/DL — LOW (ref 32–37)
MCHC RBC-ENTMCNC: 32 G/DL — SIGNIFICANT CHANGE UP (ref 32–37)
MCV RBC AUTO: 86.4 FL — SIGNIFICANT CHANGE UP (ref 81–99)
MCV RBC AUTO: 88 FL — SIGNIFICANT CHANGE UP (ref 81–99)
NITRITE UR-MCNC: NEGATIVE — SIGNIFICANT CHANGE UP
NRBC # BLD: 0 /100 WBCS — SIGNIFICANT CHANGE UP (ref 0–0)
NRBC # BLD: 0 /100 WBCS — SIGNIFICANT CHANGE UP (ref 0–0)
PH UR: 5.5 — SIGNIFICANT CHANGE UP (ref 5–8)
PLATELET # BLD AUTO: 206 K/UL — SIGNIFICANT CHANGE UP (ref 130–400)
PLATELET # BLD AUTO: 209 K/UL — SIGNIFICANT CHANGE UP (ref 130–400)
POTASSIUM SERPL-MCNC: 4.3 MMOL/L — SIGNIFICANT CHANGE UP (ref 3.5–5)
POTASSIUM SERPL-MCNC: 4.6 MMOL/L — SIGNIFICANT CHANGE UP (ref 3.5–5)
POTASSIUM SERPL-SCNC: 4.3 MMOL/L — SIGNIFICANT CHANGE UP (ref 3.5–5)
POTASSIUM SERPL-SCNC: 4.6 MMOL/L — SIGNIFICANT CHANGE UP (ref 3.5–5)
PROT SERPL-MCNC: 5.3 G/DL — LOW (ref 6–8)
PROT UR-MCNC: SIGNIFICANT CHANGE UP
RBC # BLD: 3.42 M/UL — LOW (ref 4.2–5.4)
RBC # BLD: 3.76 M/UL — LOW (ref 4.2–5.4)
RBC # FLD: 15.7 % — HIGH (ref 11.5–14.5)
RBC # FLD: 16.1 % — HIGH (ref 11.5–14.5)
RBC CASTS # UR COMP ASSIST: 6 /HPF — HIGH (ref 0–4)
SODIUM SERPL-SCNC: 138 MMOL/L — SIGNIFICANT CHANGE UP (ref 135–146)
SODIUM SERPL-SCNC: 144 MMOL/L — SIGNIFICANT CHANGE UP (ref 135–146)
SP GR SPEC: 1.01 — LOW (ref 1.01–1.02)
UROBILINOGEN FLD QL: SIGNIFICANT CHANGE UP
WBC # BLD: 7.51 K/UL — SIGNIFICANT CHANGE UP (ref 4.8–10.8)
WBC # BLD: 9.93 K/UL — SIGNIFICANT CHANGE UP (ref 4.8–10.8)
WBC # FLD AUTO: 7.51 K/UL — SIGNIFICANT CHANGE UP (ref 4.8–10.8)
WBC # FLD AUTO: 9.93 K/UL — SIGNIFICANT CHANGE UP (ref 4.8–10.8)
WBC UR QL: 6 /HPF — HIGH (ref 0–5)

## 2019-10-13 PROCEDURE — 71045 X-RAY EXAM CHEST 1 VIEW: CPT | Mod: 26

## 2019-10-13 PROCEDURE — 99232 SBSQ HOSP IP/OBS MODERATE 35: CPT

## 2019-10-13 PROCEDURE — 93010 ELECTROCARDIOGRAM REPORT: CPT

## 2019-10-13 RX ORDER — FUROSEMIDE 40 MG
20 TABLET ORAL ONCE
Refills: 0 | Status: COMPLETED | OUTPATIENT
Start: 2019-10-13 | End: 2019-10-13

## 2019-10-13 RX ORDER — FUROSEMIDE 40 MG
40 TABLET ORAL DAILY
Refills: 0 | Status: DISCONTINUED | OUTPATIENT
Start: 2019-10-13 | End: 2019-10-16

## 2019-10-13 RX ADMIN — Medication 600 MILLIGRAM(S): at 05:24

## 2019-10-13 RX ADMIN — Medication 20 MILLIGRAM(S): at 17:00

## 2019-10-13 RX ADMIN — PANTOPRAZOLE SODIUM 40 MILLIGRAM(S): 20 TABLET, DELAYED RELEASE ORAL at 06:23

## 2019-10-13 RX ADMIN — ATORVASTATIN CALCIUM 40 MILLIGRAM(S): 80 TABLET, FILM COATED ORAL at 21:50

## 2019-10-13 RX ADMIN — TAMSULOSIN HYDROCHLORIDE 0.4 MILLIGRAM(S): 0.4 CAPSULE ORAL at 21:50

## 2019-10-13 RX ADMIN — Medication 40 MILLIGRAM(S): at 08:52

## 2019-10-13 RX ADMIN — TRAMADOL HYDROCHLORIDE 50 MILLIGRAM(S): 50 TABLET ORAL at 08:52

## 2019-10-13 RX ADMIN — DEXMEDETOMIDINE HYDROCHLORIDE IN 0.9% SODIUM CHLORIDE 4.29 MICROGRAM(S)/KG/HR: 4 INJECTION INTRAVENOUS at 01:00

## 2019-10-13 RX ADMIN — CHLORHEXIDINE GLUCONATE 1 APPLICATION(S): 213 SOLUTION TOPICAL at 05:25

## 2019-10-13 RX ADMIN — QUETIAPINE FUMARATE 25 MILLIGRAM(S): 200 TABLET, FILM COATED ORAL at 21:50

## 2019-10-13 RX ADMIN — CARVEDILOL PHOSPHATE 3.12 MILLIGRAM(S): 80 CAPSULE, EXTENDED RELEASE ORAL at 17:15

## 2019-10-13 RX ADMIN — HEPARIN SODIUM 5000 UNIT(S): 5000 INJECTION INTRAVENOUS; SUBCUTANEOUS at 17:16

## 2019-10-13 RX ADMIN — Medication 3 MILLIGRAM(S): at 21:50

## 2019-10-13 RX ADMIN — Medication 81 MILLIGRAM(S): at 11:35

## 2019-10-13 RX ADMIN — Medication 600 MILLIGRAM(S): at 17:15

## 2019-10-13 RX ADMIN — INSULIN GLARGINE 32 UNIT(S): 100 INJECTION, SOLUTION SUBCUTANEOUS at 11:34

## 2019-10-13 RX ADMIN — Medication 10 MILLIGRAM(S): at 09:34

## 2019-10-13 RX ADMIN — CARVEDILOL PHOSPHATE 3.12 MILLIGRAM(S): 80 CAPSULE, EXTENDED RELEASE ORAL at 06:23

## 2019-10-13 RX ADMIN — Medication 100 GRAM(S): at 13:02

## 2019-10-13 RX ADMIN — Medication 500 MICROGRAM(S): at 20:27

## 2019-10-13 RX ADMIN — HEPARIN SODIUM 5000 UNIT(S): 5000 INJECTION INTRAVENOUS; SUBCUTANEOUS at 05:25

## 2019-10-13 RX ADMIN — TRAMADOL HYDROCHLORIDE 50 MILLIGRAM(S): 50 TABLET ORAL at 09:15

## 2019-10-13 NOTE — PROGRESS NOTE ADULT - SUBJECTIVE AND OBJECTIVE BOX
GUS WILBURN  68y, Female  Allergy: No Known Allergies      CHIEF COMPLAINT: s/p CABG (13 Oct 2019 08:36)      INTERVAL EVENTS/HPI  - No acute events overnight, pt noted to be more altered  - T(F): , Max: 98.2 (10-12-19 @ 16:00)  - Denies any worsening symptoms  - Tolerating medication  - WBC Count: 9.93 (10-12-19 @ 23:30)      ROS  General: Denies rigors, nightsweats  HEENT: Denies headache, rhinorrhea, sore throat, eye pain  CV: Denies CP, palpitations  PULM: Denies SOB, wheezing  GI: Denies hematemesis, hematochezia, melena  : Denies discharge, hematuria  MSK: Denies arthralgias, myalgias  SKIN: Denies rash, lesions  NEURO: Denies paresthesias, weakness  PSYCH: Denies depression, anxiety    VITALS:  T(F): 96.7, Max: 98.2 (10-12-19 @ 16:00)  HR: 80  BP: 108/58  RR: 18Vital Signs Last 24 Hrs  T(C): 35.9 (13 Oct 2019 08:00), Max: 36.8 (12 Oct 2019 16:00)  T(F): 96.7 (13 Oct 2019 08:00), Max: 98.2 (12 Oct 2019 16:00)  HR: 80 (13 Oct 2019 09:00) (70 - 117)  BP: 108/58 (13 Oct 2019 09:00) (88/54 - 131/71)  BP(mean): 79 (13 Oct 2019 09:00) (65 - 92)  RR: 18 (13 Oct 2019 08:00) (18 - 32)  SpO2: 99% (13 Oct 2019 09:00) (83% - 100%)    PHYSICAL EXAM:  Gen: NAD, resting in bed  HEENT: Normocephalic, atraumatic  Neck: supple, no lymphadenopathy  CV: Regular rate & regular rhythm, midline incision  Lungs: decreased BS at bases, no fremitus  Abdomen: Soft, BS present  Ext: Warm, well perfused  Neuro: non focal, awake  Skin: no rash, no erythema  Lines: no phlebitis  montejo      FH: Non-contributory  Social Hx: Non-contributory    TESTS & MEASUREMENTS:                        10.4   9.93  )-----------( 209      ( 12 Oct 2019 23:30 )             32.5     10-12    138  |  105  |  42<H>  ----------------------------<  183<H>  4.3   |  18  |  1.0    Ca    9.1      12 Oct 2019 23:30  Mg     2.4     10-12    TPro  5.5<L>  /  Alb  3.6  /  TBili  0.7  /  DBili  x   /  AST  12  /  ALT  16  /  AlkPhos  173<H>  10-12    eGFR if Non African American: 59 mL/min/1.73M2 (10-12-19 @ 23:30)  eGFR if : 68 mL/min/1.73M2 (10-12-19 @ 23:30)  eGFR if Non African American: 77 mL/min/1.73M2 (10-12-19 @ 12:00)  eGFR if : 89 mL/min/1.73M2 (10-12-19 @ 12:00)    LIVER FUNCTIONS - ( 12 Oct 2019 12:00 )  Alb: 3.6 g/dL / Pro: 5.5 g/dL / ALK PHOS: 173 U/L / ALT: 16 U/L / AST: 12 U/L / GGT: x                     INFECTIOUS DISEASES TESTING  MRSA PCR Result.: Negative (10-06-19 @ 20:00)      RADIOLOGY & ADDITIONAL TESTS:  I have personally reviewed the last Chest xray  CXR      CT      CARDIOLOGY TESTING  12 Lead ECG:   Ventricular Rate 75 BPM    Atrial Rate 256 BPM    QRS Duration 134 ms    Q-T Interval 392 ms    QTC Calculation(Bezet) 437 ms    P Axis 80 degrees    R Axis -55 degrees    T Axis -26 degrees    Diagnosis Line Atrial fibrillation  Left axis deviation  Non-specific intra-ventricular conduction block  Nonspecific T wave abnormality  Abnormal ECG    Confirmed by SPRING ARRINGTON MD ([id number]) on 10/13/2019 8:56:08 AM (10-13-19 @ 08:10)  Transthoracic Echocardiogram:    EXAM:  2-D ECHO (TTE) COMPLETE        PROCEDURE DATE:  10/12/2019      INTERPRETATION:  REPORT:    TRANSTHORACIC ECHOCARDIOGRAM REPORT         Patient Name:   GUS WILBURN Accession #: [id number]  Medical Rec #:  [id number]    Height:      64.0 in 162.6 cm  YOB: 1950  Weight:      126.0 lb 57.15 kg  Patient Age:    68 years    BSA:         1.61 m²  Patient Gender: F           BP:          96/56 mmHg       Date of Exam:        10/12/2019 1:14:04 PM  Referring Physician: [id number] ERNESTO CATALAN  Sonographer:         Katherine Mccarty  Reading Physician:   Spring Arrington M.D.    Procedure:     2D Echo/Doppler/Color Doppler Complete.  Indications:   I25.9 - Chronic Ischemic Heart Disease, unspecified  Diagnosis:     Chronic ischemic heart disease, unspecified - I25.9  Study Details: Technically adequate study. Study quality was adversely   affected                 due to the patient being uncooperative.         Summary:   1. Severely decreased global left ventricular systolic function.   2. LV Ejection Fraction by Renner's Method with a biplane EF of 22 %.   3. Normal left ventricular internal cavity size.   4. Spectral Doppler shows impaired relaxation pattern of left   ventricular myocardial filling (Grade I diastolic dysfunction).   5. Moderately reduced RV systolic function.   6. Normal right atrial size.   7. Trivial pericardial effusion.   8. Mild to moderate mitral annular calcification.   9. Moderate thickening and calcification of the anterior and posterior   mitral valve leaflets.  10. Moderate to severe mitral valve regurgitation.  11. Mild-moderate tricuspid regurgitation.  12. Peak transaortic gradient equals 20.1 mmHg, mean transaortic gradient   equals 8.4 mmHg, the calculated aortic valve area equals 1.29 cm² by the   continuity equation consistent with moderate aortic stenosis.    PHYSICIAN INTERPRETATION:  Left Ventricle: The left ventricular internal cavity size is normal.   Global LV systolic function was severely decreased. Spectral Doppler   shows impaired relaxation pattern of left ventricular myocardial filling   (Grade I diastolic dysfunction).  Right Ventricle: The right ventricular size is normal. RV systolic   function is moderately reduced.  Left Atrium: Moderately enlarged left atrium.  Right Atrium: Normal right atrial size.  Pericardium: Trivial pericardial effusion is present.  Mitral Valve: Moderate thickening and calcification of the anterior and   posterior mitral valve leaflets. There is mild to moderate mitral annular   calcification. Moderate to severe mitral valve regurgitation is seen.  Tricuspid Valve: The tricuspid valve is normal in structure.   Mild-moderate tricuspid regurgitation is visualized.  Aortic Valve: The aortic valve is trileaflet. Peak transaortic gradient   equals 20.1 mmHg, mean transaortic gradient equals 8.4 mmHg, the   calculated aortic valve area equals 1.29 cm² by the continuity equation   consistent with moderate aortic stenosis. No evidence of aortic valve   regurgitation is seen.  Pulmonic Valve: The pulmonic valve is thickened with good excursion. Mild   pulmonic valve regurgitation.  Aorta: Aortic root measured at Sinus of Valsalva is normal.  Venous: The inferior vena cava was dilated, with respiratory size   variation less than 50%.       2D AND M-MODE MEASUREMENTS (normal ranges within parentheses):  Left                  Normal   Aorta/Left             Normal  Ventricle:                     Atrium:  IVSd (2D):  1.00 cm  (0.7-1.1) AoV Cusp       1.31  (1.5-2.6)  LVPWd (2D): 0.91 cm  (0.7-1.1) Separation:     cm  LVIDd (2D): 5.10 cm  (3.4-5.7) Left Atrium    5.00  (1.9-4.0)  LVIDs (2D): 4.44 cm            (Mmode):        cm  LV FS (2D):  12.8 %   (>25%)   LA Volume      45.6  IVSd        0.89 cm  (0.7-1.1) Index ml/m²  (Mmode):                       Right  LVPWd       0.89 cm  (0.7-1.1) Ventricle:  (Mmode):                       RVd (Mmode):   1.66 cm  LVIDd       5.53 cm  (3.4-5.7) RVd (2D):      2.38 cm  (Mmode):  LVIDs       5.02 cm  (Mmode):  LV FS  9.3 %    (>25%)  (Mmode):  Relative      0.36    (<0.42)  Wall  Thickness  Rel. Wall     0.32    (<0.42)  Thickness  Mm  LV Mass      114.3  Index:        g/m²  Mmode         (10-12-19 @ 13:14)      MEDICATIONS  aspirin enteric coated 81  atorvastatin 40  carvedilol 3.125  chlorhexidine 4% Liquid 1  dexmedetomidine Infusion 0.3  dextrose 40% Gel 15  dextrose 5%. 1000  dextrose 50% Injectable 50  dextrose 50% Injectable 25  furosemide    Tablet 40  guaiFENesin   heparin  Injectable 5000  insulin glargine Injectable (LANTUS) 32  insulin lispro Injectable (HumaLOG) 10  ipratropium    for Nebulization 500  magnesium sulfate  IVPB 1  melatonin 3  pantoprazole    Tablet 40  QUEtiapine 25  sodium chloride 0.9%. 1000  tamsulosin 0.4  tiotropium 18 MICROgram(s) Capsule 1      ANTIBIOTICS:      All available historical records have been reviewed GUS WILBURN  68y, Female  Allergy: No Known Allergies      CHIEF COMPLAINT: s/p CABG (13 Oct 2019 08:36)      INTERVAL EVENTS/HPI  - No acute events overnight, pt noted to be more altered  - T(F): , Max: 98.2 (10-12-19 @ 16:00)  - Denies any worsening symptoms  - Tolerating medication  - WBC Count: 9.93 (10-12-19 @ 23:30)      ROS  unable to obtain history secondary to patient's mental status and/or sedation     VITALS:  T(F): 96.7, Max: 98.2 (10-12-19 @ 16:00)  HR: 80  BP: 108/58  RR: 18Vital Signs Last 24 Hrs  T(C): 35.9 (13 Oct 2019 08:00), Max: 36.8 (12 Oct 2019 16:00)  T(F): 96.7 (13 Oct 2019 08:00), Max: 98.2 (12 Oct 2019 16:00)  HR: 80 (13 Oct 2019 09:00) (70 - 117)  BP: 108/58 (13 Oct 2019 09:00) (88/54 - 131/71)  BP(mean): 79 (13 Oct 2019 09:00) (65 - 92)  RR: 18 (13 Oct 2019 08:00) (18 - 32)  SpO2: 99% (13 Oct 2019 09:00) (83% - 100%)    PHYSICAL EXAM:  Gen: NAD, resting in bed, sleepy  HEENT: Normocephalic, atraumatic  Neck: supple, no lymphadenopathy  CV: Regular rate & regular rhythm, midline incision  Lungs: decreased BS at bases, no fremitus  Abdomen: Soft, BS present  Ext: Warm, well perfused  Neuro: non focal, awake, altered  Skin: no rash, no erythema  Lines: no phlebitis  nikia      FH: Non-contributory  Social Hx: Non-contributory    TESTS & MEASUREMENTS:                        10.4   9.93  )-----------( 209      ( 12 Oct 2019 23:30 )             32.5     10-12    138  |  105  |  42<H>  ----------------------------<  183<H>  4.3   |  18  |  1.0    Ca    9.1      12 Oct 2019 23:30  Mg     2.4     10-12    TPro  5.5<L>  /  Alb  3.6  /  TBili  0.7  /  DBili  x   /  AST  12  /  ALT  16  /  AlkPhos  173<H>  10-12    eGFR if Non African American: 59 mL/min/1.73M2 (10-12-19 @ 23:30)  eGFR if : 68 mL/min/1.73M2 (10-12-19 @ 23:30)  eGFR if Non African American: 77 mL/min/1.73M2 (10-12-19 @ 12:00)  eGFR if : 89 mL/min/1.73M2 (10-12-19 @ 12:00)    LIVER FUNCTIONS - ( 12 Oct 2019 12:00 )  Alb: 3.6 g/dL / Pro: 5.5 g/dL / ALK PHOS: 173 U/L / ALT: 16 U/L / AST: 12 U/L / GGT: x                     INFECTIOUS DISEASES TESTING  MRSA PCR Result.: Negative (10-06-19 @ 20:00)      RADIOLOGY & ADDITIONAL TESTS:  I have personally reviewed the last Chest xray  CXR      CT      CARDIOLOGY TESTING  12 Lead ECG:   Ventricular Rate 75 BPM    Atrial Rate 256 BPM    QRS Duration 134 ms    Q-T Interval 392 ms    QTC Calculation(Bezet) 437 ms    P Axis 80 degrees    R Axis -55 degrees    T Axis -26 degrees    Diagnosis Line Atrial fibrillation  Left axis deviation  Non-specific intra-ventricular conduction block  Nonspecific T wave abnormality  Abnormal ECG    Confirmed by SPRING ARRINGTON MD ([id number]) on 10/13/2019 8:56:08 AM (10-13-19 @ 08:10)  Transthoracic Echocardiogram:    EXAM:  2-D ECHO (TTE) COMPLETE        PROCEDURE DATE:  10/12/2019      INTERPRETATION:  REPORT:    TRANSTHORACIC ECHOCARDIOGRAM REPORT         Patient Name:   GUS WILBURN Accession #: [id number]  Medical Rec #:  [id number]    Height:      64.0 in 162.6 cm  YOB: 1950  Weight:      126.0 lb 57.15 kg  Patient Age:    68 years    BSA:         1.61 m²  Patient Gender: F           BP:          96/56 mmHg       Date of Exam:        10/12/2019 1:14:04 PM  Referring Physician: [id number] ERNESTO CATALAN  Sonographer:         Katherine Mccarty  Reading Physician:   Spring Arrington M.D.    Procedure:     2D Echo/Doppler/Color Doppler Complete.  Indications:   I25.9 - Chronic Ischemic Heart Disease, unspecified  Diagnosis:     Chronic ischemic heart disease, unspecified - I25.9  Study Details: Technically adequate study. Study quality was adversely   affected                 due to the patient being uncooperative.         Summary:   1. Severely decreased global left ventricular systolic function.   2. LV Ejection Fraction by Renner's Method with a biplane EF of 22 %.   3. Normal left ventricular internal cavity size.   4. Spectral Doppler shows impaired relaxation pattern of left   ventricular myocardial filling (Grade I diastolic dysfunction).   5. Moderately reduced RV systolic function.   6. Normal right atrial size.   7. Trivial pericardial effusion.   8. Mild to moderate mitral annular calcification.   9. Moderate thickening and calcification of the anterior and posterior   mitral valve leaflets.  10. Moderate to severe mitral valve regurgitation.  11. Mild-moderate tricuspid regurgitation.  12. Peak transaortic gradient equals 20.1 mmHg, mean transaortic gradient   equals 8.4 mmHg, the calculated aortic valve area equals 1.29 cm² by the   continuity equation consistent with moderate aortic stenosis.    PHYSICIAN INTERPRETATION:  Left Ventricle: The left ventricular internal cavity size is normal.   Global LV systolic function was severely decreased. Spectral Doppler   shows impaired relaxation pattern of left ventricular myocardial filling   (Grade I diastolic dysfunction).  Right Ventricle: The right ventricular size is normal. RV systolic   function is moderately reduced.  Left Atrium: Moderately enlarged left atrium.  Right Atrium: Normal right atrial size.  Pericardium: Trivial pericardial effusion is present.  Mitral Valve: Moderate thickening and calcification of the anterior and   posterior mitral valve leaflets. There is mild to moderate mitral annular   calcification. Moderate to severe mitral valve regurgitation is seen.  Tricuspid Valve: The tricuspid valve is normal in structure.   Mild-moderate tricuspid regurgitation is visualized.  Aortic Valve: The aortic valve is trileaflet. Peak transaortic gradient   equals 20.1 mmHg, mean transaortic gradient equals 8.4 mmHg, the   calculated aortic valve area equals 1.29 cm² by the continuity equation   consistent with moderate aortic stenosis. No evidence of aortic valve   regurgitation is seen.  Pulmonic Valve: The pulmonic valve is thickened with good excursion. Mild   pulmonic valve regurgitation.  Aorta: Aortic root measured at Sinus of Valsalva is normal.  Venous: The inferior vena cava was dilated, with respiratory size   variation less than 50%.       2D AND M-MODE MEASUREMENTS (normal ranges within parentheses):  Left                  Normal   Aorta/Left             Normal  Ventricle:                     Atrium:  IVSd (2D):  1.00 cm  (0.7-1.1) AoV Cusp       1.31  (1.5-2.6)  LVPWd (2D): 0.91 cm  (0.7-1.1) Separation:     cm  LVIDd (2D): 5.10 cm  (3.4-5.7) Left Atrium    5.00  (1.9-4.0)  LVIDs (2D): 4.44 cm            (Mmode):        cm  LV FS (2D):  12.8 %   (>25%)   LA Volume      45.6  IVSd        0.89 cm  (0.7-1.1) Index ml/m²  (Mmode):                       Right  LVPWd       0.89 cm  (0.7-1.1) Ventricle:  (Mmode):                       RVd (Mmode):   1.66 cm  LVIDd       5.53 cm  (3.4-5.7) RVd (2D):      2.38 cm  (Mmode):  LVIDs       5.02 cm  (Mmode):  LV FS  9.3 %    (>25%)  (Mmode):  Relative      0.36    (<0.42)  Wall  Thickness  Rel. Wall     0.32    (<0.42)  Thickness  Mm  LV Mass      114.3  Index:        g/m²  Mmode         (10-12-19 @ 13:14)      MEDICATIONS  aspirin enteric coated 81  atorvastatin 40  carvedilol 3.125  chlorhexidine 4% Liquid 1  dexmedetomidine Infusion 0.3  dextrose 40% Gel 15  dextrose 5%. 1000  dextrose 50% Injectable 50  dextrose 50% Injectable 25  furosemide    Tablet 40  guaiFENesin   heparin  Injectable 5000  insulin glargine Injectable (LANTUS) 32  insulin lispro Injectable (HumaLOG) 10  ipratropium    for Nebulization 500  magnesium sulfate  IVPB 1  melatonin 3  pantoprazole    Tablet 40  QUEtiapine 25  sodium chloride 0.9%. 1000  tamsulosin 0.4  tiotropium 18 MICROgram(s) Capsule 1      ANTIBIOTICS:      All available historical records have been reviewed

## 2019-10-13 NOTE — PROGRESS NOTE ADULT - SUBJECTIVE AND OBJECTIVE BOX
OPERATIVE PROCEDURE(s):                POD #    SURGEON(s):      SUBJECTIVE ASSESSMENT:    Vital Signs Last 24 Hrs  T(C): 35.9 (13 Oct 2019 08:00), Max: 36.8 (12 Oct 2019 16:00)  T(F): 96.7 (13 Oct 2019 08:00), Max: 98.2 (12 Oct 2019 16:00)  HR: 81 (13 Oct 2019 08:00) (70 - 117)  BP: 112/62 (13 Oct 2019 07:00) (88/54 - 131/71)  BP(mean): 72 (13 Oct 2019 07:00) (65 - 92)  RR: 18 (13 Oct 2019 08:00) (16 - 32)  SpO2: 100% (13 Oct 2019 08:00) (83% - 100%)  10-12-19 @ 07:01  -  10-13-19 @ 07:00  --------------------------------------------------------  IN: 441.5 mL / OUT: 1365 mL / NET: -923.5 mL        Physical Exam  General:  Chest:  CVS:  Abd:   GI/ :  Ext:    Central Venous Catheter: Yes[ ]  No[ ] , If Yes indication:           Day #    Castellon Catheter: Yes  [ ] , No [ ] : If yes indication:                         Day #    NGT: Yes [ ] No [  ]     If Yes Placement:                                          Day #    Post-Op Beta-Blockers: Yes [ ], No[ ], If No, then contraindication:    CHEST TUBE:  [ ] YES [ ] NO  OUTPUT:     per 24 hours    AIR LEAKS:  [ ] YES [ ] NO      EPICARDIAL WIRES:  [ ] YES [ ] NO      BOWEL MOVEMENT:  [ ] YES [ ] NO          LABS:                        10.4   9.93  )-----------( 209      ( 12 Oct 2019 23:30 )             32.5     COUMADIN:   [ ] YES [ ] NO      10-12    138  |  105  |  42<H>  ----------------------------<  183<H>  4.3   |  18  |  1.0    Ca    9.1      12 Oct 2019 23:30  Mg     2.4     10-12    TPro  5.5<L>  /  Alb  3.6  /  TBili  0.7  /  DBili  x   /  AST  12  /  ALT  16  /  AlkPhos  173<H>  10-12        MEDICATIONS  (STANDING):  aspirin enteric coated 81 milliGRAM(s) Oral daily  atorvastatin 40 milliGRAM(s) Oral at bedtime  bisacodyl 10 milliGRAM(s) Oral every 12 hours  carvedilol 3.125 milliGRAM(s) Oral every 12 hours  dexmedetomidine Infusion 0.3 MICROgram(s)/kG/Hr (4.29 mL/Hr) IV Continuous <Continuous>  guaiFENesin  milliGRAM(s) Oral every 12 hours  heparin  Injectable 5000 Unit(s) SubCutaneous every 12 hours  insulin glargine Injectable (LANTUS) 32 Unit(s) SubCutaneous every morning  insulin lispro Injectable (HumaLOG) 10 Unit(s) SubCutaneous three times a day before meals  ipratropium    for Nebulization 500 MICROGram(s) Nebulizer every 6 hours  magnesium sulfate  IVPB 1 Gram(s) IV Intermittent daily  melatonin 3 milliGRAM(s) Oral at bedtime  pantoprazole    Tablet 40 milliGRAM(s) Oral before breakfast  QUEtiapine 25 milliGRAM(s) Oral at bedtime  tamsulosin 0.4 milliGRAM(s) Oral at bedtime  tiotropium 18 MICROgram(s) Capsule 1 Capsule(s) Inhalation daily    MEDICATIONS  (PRN):  dextrose 40% Gel 15 Gram(s) Oral once PRN Blood Glucose LESS THAN 70 milliGRAM(s)/deciliter  glucagon  Injectable 1 milliGRAM(s) IntraMuscular once PRN Glucose LESS THAN 70 milligrams/deciliter  traMADol 50 milliGRAM(s) Oral every 4 hours PRN Moderate Pain (4 - 6)  traMADol 25 milliGRAM(s) Oral every 4 hours PRN Mild Pain (1 - 3)

## 2019-10-13 NOTE — PROGRESS NOTE ADULT - ATTENDING COMMENTS
POD 6 after CABG/NUBIA clip c/b GI bleeding/diarrhea  last two days confused, orientable, altered  reports not sleeping two nights     CV:   cont atorvastatin  cont coreg  cont asa  TTE EF 22%, mod to severe MR, mild to moderate AS  in and out of AF, rate controlled    GI:  cont PO PPI bid    Resp: NC 3L O2  room air ABG yesterday PO 2 of 67    Renal:  bun/creat ratio stable  diuresis today (even for 24hrs), negative 0.9L/24hrs  CXR well aerated, no evidence of congestion, R pleural effusion    Heme:  cont asa/sqh  pt is not a good candidate for anticoagulation b/c GI bleeding and appendage is clipped    GYN/:  baclofen stopped due to altered mental status  there is a concern for excoriation of prolapsed uterus and bladder  will reach out to Dr. Rodriguez's team to assist with management    ID:  there is a concern for infection causing altered mental status  will check UA once bladder catheter is changed    case d/w CTU team

## 2019-10-13 NOTE — PROGRESS NOTE ADULT - ASSESSMENT
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 Denies abd pain. Diarrhea is watery, no foul smell.     IMPRESSION  #Diarrhea, likely melena 2/2 GIB, lower suspicion for recurrent Cdiff as recent kayexalate, no abd pain, no foul smell.     +Leukocytosis could be stress related 2/2 OR and recent steroids, downtrending     PO vanc ended 10/5    suspect ams 2/2 med side effect,  Xanax and Baclofen, no evidence of active infection    RECOMMENDATIONS  - Monitor off abx      Spectra [id number] ASSESSMENT  68F uncontrolled DM (non-compliant), viral myopathy, Paroxysmal Afib, recent labial abscess s/p ID cx with candida, hip fracture s/p fixation, recent LGIB, chronic montejo, initially admitted with hypoxemic resp failure 2/2 HF s/p intubation/extubation, course complicated by Cdiff infection 9/25, now s/p CABG 10/7  Post CABG pt was on steroids, also given Kayexalate and developed diarrhea. Denies abd pain. Diarrhea is watery, no foul smell.     IMPRESSION  #Diarrhea, likely melena 2/2 GIB, lower suspicion for recurrent Cdiff as recent kayexalate, no abd pain, no foul smell.     +Leukocytosis could be stress related 2/2 OR and recent steroids, downtrending     PO vanc ended 10/5    suspect ams 2/2 med side effect/insomnia (received Xanax and Baclofen), no evidence of active infection, r/o UTI    RECOMMENDATIONS  - Monitor off abx, no fever or leukocytosis  - r/o UTI, need to change montejo and obtain new sample  - if fever or hemodynamic compromise, start empiric abx Cefepime 1g q12h IV + PO ppx vanc 125 mg BID    Spectra [phone]

## 2019-10-13 NOTE — PROGRESS NOTE ADULT - SUBJECTIVE AND OBJECTIVE BOX
OBGYN PGY3 Note:     Pt seen at bedside. Montejo catheter replaced (16Fr) and sterile catheterized specimen sent for Ucx.     - plan to keep montejo for atleast 1 month     Dr. Rodriguez aware. OBGYN PGY3 Note:     Pt seen and evaluated at bedside. Montejo catheter replaced (16Fr) and sterile catheterized specimen sent for Ucx.   Upon local exam- left labial skin tag noted; pt states no change in size/character for at least 15 years, I offered biopsy and/or removal, pt would like to f/u as outpatient with GYN.     - plan to keep montejo for atleast 1 month   - f/u Ucx

## 2019-10-13 NOTE — PROGRESS NOTE ADULT - ASSESSMENT
67 y/o F with h/o DM admitted with c/o acute onset of SOB associated with nausea, back pain and weakness. Patient with positive troponin on admission and LVEF of ~ 15%. 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 Patient underwent CABG and NUBIA clipping. Post op course complicated by GI bleeding and diarrhea; she was treated for Cdiff.  Patient is hemodynamically stable off pressors, EF ~ 20% with elevated filling pressures and plethoric IVC.

## 2019-10-13 NOTE — PROGRESS NOTE ADULT - NSHPATTENDINGPLANDISCUSS_GEN_ALL_CORE
CTU
CTU team
spoke to resident on call
team
patient's resident
residents in the CCU
team

## 2019-10-14 LAB
CULTURE RESULTS: SIGNIFICANT CHANGE UP
GLUCOSE BLDC GLUCOMTR-MCNC: 117 MG/DL — HIGH (ref 70–99)
GLUCOSE BLDC GLUCOMTR-MCNC: 78 MG/DL — SIGNIFICANT CHANGE UP (ref 70–99)
GLUCOSE BLDC GLUCOMTR-MCNC: 81 MG/DL — SIGNIFICANT CHANGE UP (ref 70–99)
GLUCOSE BLDC GLUCOMTR-MCNC: 89 MG/DL — SIGNIFICANT CHANGE UP (ref 70–99)
SPECIMEN SOURCE: SIGNIFICANT CHANGE UP

## 2019-10-14 PROCEDURE — 71045 X-RAY EXAM CHEST 1 VIEW: CPT | Mod: 26

## 2019-10-14 PROCEDURE — 99233 SBSQ HOSP IP/OBS HIGH 50: CPT

## 2019-10-14 RX ORDER — ACETAMINOPHEN 500 MG
650 TABLET ORAL EVERY 6 HOURS
Refills: 0 | Status: DISCONTINUED | OUTPATIENT
Start: 2019-10-14 | End: 2019-10-18

## 2019-10-14 RX ORDER — GABAPENTIN 400 MG/1
100 CAPSULE ORAL
Refills: 0 | Status: DISCONTINUED | OUTPATIENT
Start: 2019-10-14 | End: 2019-10-18

## 2019-10-14 RX ADMIN — Medication 500 MICROGRAM(S): at 14:19

## 2019-10-14 RX ADMIN — TAMSULOSIN HYDROCHLORIDE 0.4 MILLIGRAM(S): 0.4 CAPSULE ORAL at 21:31

## 2019-10-14 RX ADMIN — Medication 40 MILLIGRAM(S): at 06:20

## 2019-10-14 RX ADMIN — HEPARIN SODIUM 5000 UNIT(S): 5000 INJECTION INTRAVENOUS; SUBCUTANEOUS at 19:00

## 2019-10-14 RX ADMIN — HEPARIN SODIUM 5000 UNIT(S): 5000 INJECTION INTRAVENOUS; SUBCUTANEOUS at 06:20

## 2019-10-14 RX ADMIN — Medication 600 MILLIGRAM(S): at 19:00

## 2019-10-14 RX ADMIN — QUETIAPINE FUMARATE 25 MILLIGRAM(S): 200 TABLET, FILM COATED ORAL at 21:30

## 2019-10-14 RX ADMIN — Medication 81 MILLIGRAM(S): at 13:09

## 2019-10-14 RX ADMIN — Medication 500 MICROGRAM(S): at 07:33

## 2019-10-14 RX ADMIN — Medication 650 MILLIGRAM(S): at 12:30

## 2019-10-14 RX ADMIN — Medication 10 UNIT(S): at 07:30

## 2019-10-14 RX ADMIN — TRAMADOL HYDROCHLORIDE 50 MILLIGRAM(S): 50 TABLET ORAL at 08:01

## 2019-10-14 RX ADMIN — INSULIN GLARGINE 32 UNIT(S): 100 INJECTION, SOLUTION SUBCUTANEOUS at 08:00

## 2019-10-14 RX ADMIN — Medication 600 MILLIGRAM(S): at 06:20

## 2019-10-14 RX ADMIN — Medication 500 MICROGRAM(S): at 21:03

## 2019-10-14 RX ADMIN — Medication 3 MILLIGRAM(S): at 21:30

## 2019-10-14 RX ADMIN — CARVEDILOL PHOSPHATE 3.12 MILLIGRAM(S): 80 CAPSULE, EXTENDED RELEASE ORAL at 06:20

## 2019-10-14 RX ADMIN — PANTOPRAZOLE SODIUM 40 MILLIGRAM(S): 20 TABLET, DELAYED RELEASE ORAL at 06:21

## 2019-10-14 RX ADMIN — TRAMADOL HYDROCHLORIDE 50 MILLIGRAM(S): 50 TABLET ORAL at 08:30

## 2019-10-14 RX ADMIN — ATORVASTATIN CALCIUM 40 MILLIGRAM(S): 80 TABLET, FILM COATED ORAL at 21:31

## 2019-10-14 RX ADMIN — Medication 100 GRAM(S): at 13:09

## 2019-10-14 NOTE — PROGRESS NOTE ADULT - SUBJECTIVE AND OBJECTIVE BOX
69 yo F w uncontrolled DM (non-compliant), viral myopathy, Paroxysmal Afib, recent labial abscess s/p ID, hip fracture s/p fixation, recent LGIB bleed now presents for shortness of breath. Patient has been having shortness of breath for the last day. Occurs on exertion and stationary however no worsening of breathing when laying down. Patient denied abdominal pain however family reports she has been having some abdominal discomfort over the last day. Patient also has an indwelling Montejo for the last 3 weeks for urinary retention. Reports cough with no sputum production. Denies chest pain, fever, nausea/vomiting, diarrhea. CC- Ischemic Cardiomyopathy, NSTEMI, Severe CAD with LM/TVD and strong fam history of CAD.    POD # 7 CABG x3, EF 20-25% and left atrial appendage clipping  Montejo was changed by GYN 10/13. UA negative. Urine c/s pending.  Improved Mental status post d/c baclofen and xanax. Seroquel was started   Rate control A fib    Vital Signs Last 24 Hrs  T(C): 35.7 (14 Oct 2019 06:00), Max: 36.6 (13 Oct 2019 12:00)  T(F): 96.2 (14 Oct 2019 06:00), Max: 97.8 (13 Oct 2019 12:00)  HR: 92 (14 Oct 2019 08:00) (68 - 92)  BP: 105/86 (14 Oct 2019 08:00) (88/53 - 129/76)  BP(mean): 92 (14 Oct 2019 08:00) (65 - 97)  RR: 22 (14 Oct 2019 07:00) (16 - 40)  SpO2: 98% (14 Oct 2019 08:00) (79% - 100%)    alert, awake, verbal - easily falls asleep  follows commands appropriately  no JVD  Clean sternal incision  S1S2 irreg rate and rhythm  b/l air entry, no wheeze,   soft abdomen, non tender, non distended  warm periphery with + distal pulses, b/l pitting edema    WBC 7.5, Hg 9.6, plt 206  Cr 0.8, BUN 41  UA - Blood, mod, WBC 6, Bact neg, sp grav 1.009    CXR - bibasilar pleural effusions, L>R. platelike atelectasis at right base.    a/p:    CAD s/p CABG  Rate controlled A fib  Encephalopathy likely due to meds (xanax, baclofen)  Acute on Chr. Hypoxic Resp failure  Uterine prolopase    PO PPI, PO ASA 81 mg, Hep SC q8  Statin 40 mg QD, Coreg 3.125 bid  cont with Lasix 40 mg PO QD (sp grav on UA 1.009)  Lantus 32, Lispro 10  Improved encephalopathy after topping Xanax and Baclofen  keep montejo, cont with tamsulosin. F/u Urine c/s sent on 10/13.  A fib is rate controlled with clipped left atrial appendage. No a/c at this time  Wean off NC. Currently on 1-2 liters.  OOB to chair  glycemic control, DVT proph

## 2019-10-14 NOTE — PROGRESS NOTE ADULT - SUBJECTIVE AND OBJECTIVE BOX
OPERATIVE PROCEDURE(s):                POD #  7 s/p CABG                     68yFemale  SURGEON(s): CHINA Escobar  SUBJECTIVE ASSESSMENT: more alert today, but still with episodes on non-compliance  Vital Signs Last 24 Hrs  T(F): 96.2 (14 Oct 2019 06:00), Max: 97.8 (13 Oct 2019 12:00)  HR: 80 (14 Oct 2019 07:00) (68 - 87)  BP: 120/58 (14 Oct 2019 07:00) (88/53 - 129/76)  BP(mean): 80 (14 Oct 2019 06:00) (65 - 97)  RR: 22 (14 Oct 2019 07:00) (16 - 40)  SpO2: 99% (14 Oct 2019 07:00) (79% - 100%)    I&O's Detail    13 Oct 2019 07:01  -  14 Oct 2019 07:00  --------------------------------------------------------  IN:    Oral Fluid: 360 mL  Total IN: 360 mL    OUT:    Indwelling Catheter - Urethral: 1735 mL  Total OUT: 1735 mL        Net:   I&O's Detail    12 Oct 2019 07:01  -  13 Oct 2019 07:00  --------------------------------------------------------  Total NET: -923.5 mL      13 Oct 2019 07:01  -  14 Oct 2019 07:00  --------------------------------------------------------  Total NET: -1375 mL        CAPILLARY BLOOD GLUCOSE  108 (13 Oct 2019 20:00)      POCT Blood Glucose.: 89 mg/dL (14 Oct 2019 06:37)  POCT Blood Glucose.: 108 mg/dL (13 Oct 2019 19:19)  POCT Blood Glucose.: 140 mg/dL (13 Oct 2019 15:47)  POCT Blood Glucose.: 167 mg/dL (13 Oct 2019 11:22)    Physical Exam:  General: NAD;   Cardiac: S1/S2, RRR, no murmur, no rubs  Lungs: unlabored shallow respirations, bilateral bs decreeased at bases  Abdomen: Soft/NT/ND;  Sternum: Intact, no click, incision healing well with no drainage  Incisions: Incisions clean/dry/intact  Extremities: No significant edema b/l lower extremities; good capillary refill    Central Venous Catheter: Yes[]  No[] , If Yes indication:           Day #  Castellon Catheter: Yes  [] , I& O and prolapse  EPICARDIAL WIRES:  [] YES [] NO                                              Day #  BOWEL MOVEMENT:  [] YES [] NO, If No, Timing since last BM:      Day #      LABS:                        9.6<L>  7.51  )-----------( 206      ( 13 Oct 2019 22:46 )             30.1<L>                        10.4<L>  9.93  )-----------( 209      ( 12 Oct 2019 23:30 )             32.5<L>    10    144  |  108  |  41<H>  ----------------------------<  124<H>  4.6   |  24  |  0.8  10-12    138  |  105  |  42<H>  ----------------------------<  183<H>  4.3   |  18  |  1.0    Ca    9.2      13 Oct 2019 22:46  Mg     2.2     10-    TPro  5.3<L> [6.0 - 8.0]  /  Alb  3.3<L> [3.5 - 5.2]  /  TBili  0.5 [0.2 - 1.2]  /  DBili  x   /  AST  8 [0 - 41]  /  ALT  10 [0 - 41]  /  AlkPhos  137<H> [30 - 115]  10-13      Urinalysis Basic - ( 13 Oct 2019 13:40 )    Color: Light Yellow / Appearance: Clear / S.009 / pH: x  Gluc: x / Ketone: Negative  / Bili: Negative / Urobili: <2 mg/dL   Blood: x / Protein: Trace / Nitrite: Negative   Leuk Esterase: Small / RBC: 6 /HPF / WBC 6 /HPF   Sq Epi: x / Non Sq Epi: 1 /HPF / Bacteria: Negative      ABG - ( 12 Oct 2019 08:38 )  pH: 7.41  /  pCO2: 33    /  pO2: 67    / HCO3: 21    / Base Excess: -3.0  /  SaO2: 94    /  LA: 0.6        RADIOLOGY & ADDITIONAL TESTS:  CXR: < from: Xray Chest 1 View- PORTABLE-Routine (10.13.19 @ 06:10) >  Impression:      Bilateral pleural effusions no change. No air leak.    < end of copied text >    EKG:  MEDICATIONS  (STANDING):  aspirin enteric coated 81 milliGRAM(s) Oral daily  atorvastatin 40 milliGRAM(s) Oral at bedtime  carvedilol 3.125 milliGRAM(s) Oral every 12 hours  chlorhexidine 4% Liquid 1 Application(s) Topical <User Schedule>  dexmedetomidine Infusion 0.3 MICROgram(s)/kG/Hr (4.29 mL/Hr) IV Continuous <Continuous>  dextrose 40% Gel 15 Gram(s) Oral once  dextrose 5%. 1000 milliLiter(s) (50 mL/Hr) IV Continuous <Continuous>  dextrose 50% Injectable 50 milliLiter(s) IV Push every 15 minutes  dextrose 50% Injectable 25 milliLiter(s) IV Push every 15 minutes  furosemide    Tablet 40 milliGRAM(s) Oral daily  guaiFENesin  milliGRAM(s) Oral every 12 hours  heparin  Injectable 5000 Unit(s) SubCutaneous every 12 hours  insulin glargine Injectable (LANTUS) 32 Unit(s) SubCutaneous every morning  insulin lispro Injectable (HumaLOG) 10 Unit(s) SubCutaneous three times a day before meals  ipratropium    for Nebulization 500 MICROGram(s) Nebulizer every 6 hours  magnesium sulfate  IVPB 1 Gram(s) IV Intermittent daily  melatonin 3 milliGRAM(s) Oral at bedtime  pantoprazole    Tablet 40 milliGRAM(s) Oral before breakfast  QUEtiapine 25 milliGRAM(s) Oral at bedtime  sodium chloride 0.9%. 1000 milliLiter(s) (10 mL/Hr) IV Continuous <Continuous>  tamsulosin 0.4 milliGRAM(s) Oral at bedtime  tiotropium 18 MICROgram(s) Capsule 1 Capsule(s) Inhalation daily    MEDICATIONS  (PRN):  dextrose 40% Gel 15 Gram(s) Oral once PRN Blood Glucose LESS THAN 70 milliGRAM(s)/deciliter  glucagon  Injectable 1 milliGRAM(s) IntraMuscular once PRN Glucose LESS THAN 70 milligrams/deciliter  traMADol 50 milliGRAM(s) Oral every 4 hours PRN Moderate Pain (4 - 6)  traMADol 25 milliGRAM(s) Oral every 4 hours PRN Mild Pain (1 - 3)    Allergies    No Known Allergies    Intolerances      Ambulation/Activity Status:    Assessment/Plan:  68y Female status-post CABG POD #7  - Case and plan discussed with CTU Intensivist and CT Surgeon - Dr. Urbano/Flaco/Shwan   - Continue CTU supportive care    - Continue DVT/GI prophylaxis  - Incentive Spirometry 10 times an hour  - Continue to advance physical activity as tolerated and continue PT/OT as directed  1. CAD: Continue ASA, statin, BB  2. HTN:   3. A. Fib:   4. COPD/Hypoxia:   5. DM/Glucose Control:     Social Service Disposition: OPERATIVE PROCEDURE(s):                POD #  7 s/p CABG                     68yFemale  SURGEON(s): CHINA Escobar  SUBJECTIVE ASSESSMENT: more alert today, but still with episodes on non-compliance  Vital Signs Last 24 Hrs  T(F): 96.2 (14 Oct 2019 06:00), Max: 97.8 (13 Oct 2019 12:00)  HR: 80 (14 Oct 2019 07:00) (68 - 87)  BP: 120/58 (14 Oct 2019 07:00) (88/53 - 129/76)  BP(mean): 80 (14 Oct 2019 06:00) (65 - 97)  RR: 22 (14 Oct 2019 07:00) (16 - 40)  SpO2: 99% (14 Oct 2019 07:00) (79% - 100%)    I&O's Detail    13 Oct 2019 07:01  -  14 Oct 2019 07:00  --------------------------------------------------------  IN:    Oral Fluid: 360 mL  Total IN: 360 mL    OUT:    Indwelling Catheter - Urethral: 1735 mL  Total OUT: 1735 mL        Net:   I&O's Detail    12 Oct 2019 07:01  -  13 Oct 2019 07:00  --------------------------------------------------------  Total NET: -923.5 mL      13 Oct 2019 07:01  -  14 Oct 2019 07:00  --------------------------------------------------------  Total NET: -1375 mL        CAPILLARY BLOOD GLUCOSE  108 (13 Oct 2019 20:00)      POCT Blood Glucose.: 89 mg/dL (14 Oct 2019 06:37)  POCT Blood Glucose.: 108 mg/dL (13 Oct 2019 19:19)  POCT Blood Glucose.: 140 mg/dL (13 Oct 2019 15:47)  POCT Blood Glucose.: 167 mg/dL (13 Oct 2019 11:22)    Physical Exam:  General: NAD;   Cardiac: S1/S2, RRR, no murmur, no rubs  Lungs: unlabored shallow respirations, bilateral bs decreeased at bases  Abdomen: Soft/NT/ND;  Sternum: Intact, no click, incision healing well with no drainage  Incisions: Incisions clean/dry/intact  Extremities: No significant edema b/l lower extremities; good capillary refill    Castellon Catheter: Yes  [] , I& O and prolapse  EPICARDIAL WIRES:  [] YES                                                BOWEL MOVEMENT:  [] YES     LABS:                        9.6<L>  7.51  )-----------( 206      ( 13 Oct 2019 22:46 )             30.1<L>                        10.4<L>  9.93  )-----------( 209      ( 12 Oct 2019 23:30 )             32.5<L>    10    144  |  108  |  41<H>  ----------------------------<  124<H>  4.6   |  24  |  0.8  10-12    138  |  105  |  42<H>  ----------------------------<  183<H>  4.3   |  18  |  1.0    Ca    9.2      13 Oct 2019 22:46  Mg     2.2     10-13    TPro  5.3<L> [6.0 - 8.0]  /  Alb  3.3<L> [3.5 - 5.2]  /  TBili  0.5 [0.2 - 1.2]  /  DBili  x   /  AST  8 [0 - 41]  /  ALT  10 [0 - 41]  /  AlkPhos  137<H> [30 - 115]  10-13      Urinalysis Basic - ( 13 Oct 2019 13:40 )    Color: Light Yellow / Appearance: Clear / S.009 / pH: x  Gluc: x / Ketone: Negative  / Bili: Negative / Urobili: <2 mg/dL   Blood: x / Protein: Trace / Nitrite: Negative   Leuk Esterase: Small / RBC: 6 /HPF / WBC 6 /HPF   Sq Epi: x / Non Sq Epi: 1 /HPF / Bacteria: Negative      ABG - ( 12 Oct 2019 08:38 )  pH: 7.41  /  pCO2: 33    /  pO2: 67    / HCO3: 21    / Base Excess: -3.0  /  SaO2: 94    /  LA: 0.6        RADIOLOGY & ADDITIONAL TESTS:  CXR: < from: Xray Chest 1 View- PORTABLE-Routine (10.13.19 @ 06:10) >  Impression:      Bilateral pleural effusions no change. No air leak.    < end of copied text >    EKG:  MEDICATIONS  (STANDING):  aspirin enteric coated 81 milliGRAM(s) Oral daily  atorvastatin 40 milliGRAM(s) Oral at bedtime  carvedilol 3.125 milliGRAM(s) Oral every 12 hours  chlorhexidine 4% Liquid 1 Application(s) Topical <User Schedule>  dexmedetomidine Infusion 0.3 MICROgram(s)/kG/Hr (4.29 mL/Hr) IV Continuous <Continuous>  dextrose 40% Gel 15 Gram(s) Oral once  dextrose 5%. 1000 milliLiter(s) (50 mL/Hr) IV Continuous <Continuous>  dextrose 50% Injectable 50 milliLiter(s) IV Push every 15 minutes  dextrose 50% Injectable 25 milliLiter(s) IV Push every 15 minutes  furosemide    Tablet 40 milliGRAM(s) Oral daily  guaiFENesin  milliGRAM(s) Oral every 12 hours  heparin  Injectable 5000 Unit(s) SubCutaneous every 12 hours  insulin glargine Injectable (LANTUS) 32 Unit(s) SubCutaneous every morning  insulin lispro Injectable (HumaLOG) 10 Unit(s) SubCutaneous three times a day before meals  ipratropium    for Nebulization 500 MICROGram(s) Nebulizer every 6 hours  magnesium sulfate  IVPB 1 Gram(s) IV Intermittent daily  melatonin 3 milliGRAM(s) Oral at bedtime  pantoprazole    Tablet 40 milliGRAM(s) Oral before breakfast  QUEtiapine 25 milliGRAM(s) Oral at bedtime  sodium chloride 0.9%. 1000 milliLiter(s) (10 mL/Hr) IV Continuous <Continuous>  tamsulosin 0.4 milliGRAM(s) Oral at bedtime  tiotropium 18 MICROgram(s) Capsule 1 Capsule(s) Inhalation daily    MEDICATIONS  (PRN):  dextrose 40% Gel 15 Gram(s) Oral once PRN Blood Glucose LESS THAN 70 milliGRAM(s)/deciliter  glucagon  Injectable 1 milliGRAM(s) IntraMuscular once PRN Glucose LESS THAN 70 milligrams/deciliter  traMADol 50 milliGRAM(s) Oral every 4 hours PRN Moderate Pain (4 - 6)  traMADol 25 milliGRAM(s) Oral every 4 hours PRN Mild Pain (1 - 3)    Allergies    No Known Allergies    Intolerances      Ambulation/Activity Status:    Assessment/Plan:  68y Female status-post CABG POD #7  - Case and plan discussed with CTU Intensivist and CT Surgeon - Dr. Urbano/Flaco/Shawn   - Continue CTU supportive care    - Continue DVT/GI prophylaxis  - Incentive Spirometry 10 times an hour  - Continue to advance physical activity as tolerated and continue PT/OT as directed  1. CAD: Continue ASA, statin, BB  2. HTN:   3. A. Fib:   4. COPD/Hypoxia:   5. DM/Glucose Control:     Social Service Disposition:

## 2019-10-14 NOTE — PROGRESS NOTE ADULT - SUBJECTIVE AND OBJECTIVE BOX
SUBJ: feels better      MEDICATIONS  (STANDING):  aspirin enteric coated 81 milliGRAM(s) Oral daily  atorvastatin 40 milliGRAM(s) Oral at bedtime  carvedilol 3.125 milliGRAM(s) Oral every 12 hours  chlorhexidine 4% Liquid 1 Application(s) Topical <User Schedule>  dextrose 40% Gel 15 Gram(s) Oral once  dextrose 5%. 1000 milliLiter(s) (50 mL/Hr) IV Continuous <Continuous>  dextrose 50% Injectable 50 milliLiter(s) IV Push every 15 minutes  dextrose 50% Injectable 25 milliLiter(s) IV Push every 15 minutes  furosemide    Tablet 40 milliGRAM(s) Oral daily  guaiFENesin  milliGRAM(s) Oral every 12 hours  heparin  Injectable 5000 Unit(s) SubCutaneous every 12 hours  insulin glargine Injectable (LANTUS) 32 Unit(s) SubCutaneous every morning  insulin lispro Injectable (HumaLOG) 10 Unit(s) SubCutaneous three times a day before meals  ipratropium    for Nebulization 500 MICROGram(s) Nebulizer every 6 hours  magnesium sulfate  IVPB 1 Gram(s) IV Intermittent daily  melatonin 3 milliGRAM(s) Oral at bedtime  pantoprazole    Tablet 40 milliGRAM(s) Oral before breakfast  QUEtiapine 25 milliGRAM(s) Oral at bedtime  sodium chloride 0.9%. 1000 milliLiter(s) (10 mL/Hr) IV Continuous <Continuous>  tamsulosin 0.4 milliGRAM(s) Oral at bedtime  tiotropium 18 MICROgram(s) Capsule 1 Capsule(s) Inhalation daily    MEDICATIONS  (PRN):  dextrose 40% Gel 15 Gram(s) Oral once PRN Blood Glucose LESS THAN 70 milliGRAM(s)/deciliter  glucagon  Injectable 1 milliGRAM(s) IntraMuscular once PRN Glucose LESS THAN 70 milligrams/deciliter  traMADol 50 milliGRAM(s) Oral every 4 hours PRN Moderate Pain (4 - 6)  traMADol 25 milliGRAM(s) Oral every 4 hours PRN Mild Pain (1 - 3)            Vital Signs Last 24 Hrs  T(C): 35.7 (14 Oct 2019 06:00), Max: 36.6 (13 Oct 2019 12:00)  T(F): 96.2 (14 Oct 2019 06:00), Max: 97.8 (13 Oct 2019 12:00)  HR: 92 (14 Oct 2019 08:00) (68 - 92)  BP: 105/86 (14 Oct 2019 08:00) (88/53 - 129/76)  BP(mean): 92 (14 Oct 2019 08:00) (65 - 97)  RR: 22 (14 Oct 2019 07:00) (16 - 40)  SpO2: 98% (14 Oct 2019 08:00) (79% - 100%)     REVIEW OF SYSTEMS:  CONSTITUTIONAL: No fever, weight loss, or fatigue  CARDIOLOGY: PAtient denies chest pain, shortness of breath or syncopal episodes.   RESPIRATORY: denies shortness of breath, wheezeing.   NEUROLOGICAL: NO weakness, no focal deficits to report.  ENDOCRINOLOGICAL: no recent change in diabetic medications.   GI: no BRBPR, no N,V,diarrhea.    PSYCHIATRY: normal mood and affect  HEENT: no nasal discharge, no ecchymosis  SKIN: no ecchymosis, no breakdown  MUSCULOSKELETAL: Full range of motion x4.        PHYSICAL EXAM:  · CONSTITUTIONAL:	Well-developed, well nourished    BMI-  ·RESPIRATORY:   airway patent; breath sounds equal; good air movement; respirations non-labored; clear to auscultation bilaterally; no chest wall tenderness; no intercostal retractions; no rales,rhonchi or wheeze  · CARDIOVASCULAR	regular rate and rhythm  no rub  no murmur  normal PMI  · EXTREMITIES: No cyanosis, clubbing or edema  · VASCULAR: 	Equal and normal pulses (carotid, femoral, dorsalis pedis)  	  TELEMETRY:    ECG:    TTE:    LABS:                        9.6    7.51  )-----------( 206      ( 13 Oct 2019 22:46 )             30.1     10-13    144  |  108  |  41<H>  ----------------------------<  124<H>  4.6   |  24  |  0.8    Ca    9.2      13 Oct 2019 22:46  Mg     2.2     10-13    TPro  5.3<L>  /  Alb  3.3<L>  /  TBili  0.5  /  DBili  x   /  AST  8   /  ALT  10  /  AlkPhos  137<H>  10-13            I&O's Summary    13 Oct 2019 07:01  -  14 Oct 2019 07:00  --------------------------------------------------------  IN: 360 mL / OUT: 1735 mL / NET: -1375 mL    14 Oct 2019 07:01  -  14 Oct 2019 10:07  --------------------------------------------------------  IN: 240 mL / OUT: 0 mL / NET: 240 mL      BNP  RADIOLOGY & ADDITIONAL STUDIES:    IMPRESSION AND PLAN:    Continue with supportive care

## 2019-10-14 NOTE — PROGRESS NOTE ADULT - ASSESSMENT
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 Denies abd pain. Diarrhea is watery, no foul smell.     IMPRESSION  #Diarrhea, likely melena 2/2 GIB, lower suspicion for recurrent Cdiff as recent kayexalate, no abd pain, no foul smell. Diarrhea has resolved    +Leukocytosis could be stress related 2/2 OR and recent steroids, downtrending     PO vanc ended 10/5    suspect ams 2/2 med side effect/insomnia (received Xanax and Baclofen), no evidence of active infection, r/o UTI. AMS resolved. Presently alert, responsive with no complaints    RECOMMENDATIONS  - Monitor off abx, no fever or leukocytosis  - if fever or hemodynamic compromise, start empiric abx Cefepime 1g q12h IV + PO ppx vanc 125 mg BID

## 2019-10-14 NOTE — PROGRESS NOTE ADULT - SUBJECTIVE AND OBJECTIVE BOX
GUS WILBURN  68y, Female      OVERNIGHT EVENTS:    no fevers, no pressors, no diarrhea.  Has no complaints    VITALS:  T(F): 96.2, Max: 97.8 (10-13-19 @ 12:00)  HR: 81  BP: 111/56  RR: 22Vital Signs Last 24 Hrs  T(C): 35.7 (14 Oct 2019 06:00), Max: 36.6 (13 Oct 2019 12:00)  T(F): 96.2 (14 Oct 2019 06:00), Max: 97.8 (13 Oct 2019 12:00)  HR: 81 (14 Oct 2019 06:00) (68 - 87)  BP: 111/56 (14 Oct 2019 06:00) (88/53 - 129/76)  BP(mean): 80 (14 Oct 2019 06:00) (65 - 97)  RR: 22 (14 Oct 2019 06:00) (16 - 40)  SpO2: 99% (14 Oct 2019 06:00) (79% - 100%)    TESTS & MEASUREMENTS:                        9.6    7.51  )-----------( 206      ( 13 Oct 2019 22:46 )             30.1     10-    144  |  108  |  41<H>  ----------------------------<  124<H>  4.6   |  24  |  0.8    Ca    9.2      13 Oct 2019 22:46  Mg     2.2     10-    TPro  5.3<L>  /  Alb  3.3<L>  /  TBili  0.5  /  DBili  x   /  AST  8   /  ALT  10  /  AlkPhos  137<H>  10-13    LIVER FUNCTIONS - ( 13 Oct 2019 22:46 )  Alb: 3.3 g/dL / Pro: 5.3 g/dL / ALK PHOS: 137 U/L / ALT: 10 U/L / AST: 8 U/L / GGT: x             Urinalysis Basic - ( 13 Oct 2019 13:40 )    Color: Light Yellow / Appearance: Clear / S.009 / pH: x  Gluc: x / Ketone: Negative  / Bili: Negative / Urobili: <2 mg/dL   Blood: x / Protein: Trace / Nitrite: Negative   Leuk Esterase: Small / RBC: 6 /HPF / WBC 6 /HPF   Sq Epi: x / Non Sq Epi: 1 /HPF / Bacteria: Negative          RADIOLOGY & ADDITIONAL TESTS:    ANTIBIOTICS:

## 2019-10-14 NOTE — PROGRESS NOTE ADULT - RS GEN PE MLT RESP DETAILS PC
no rhonchi/no wheezes/no rales
clear to auscultation bilaterally
airway patent/good air movement
Bibasilar crackles

## 2019-10-15 LAB
ALBUMIN SERPL ELPH-MCNC: 3.1 G/DL — LOW (ref 3.5–5.2)
ALP SERPL-CCNC: 134 U/L — HIGH (ref 30–115)
ALT FLD-CCNC: 9 U/L — SIGNIFICANT CHANGE UP (ref 0–41)
ANION GAP SERPL CALC-SCNC: 14 MMOL/L — SIGNIFICANT CHANGE UP (ref 7–14)
AST SERPL-CCNC: 11 U/L — SIGNIFICANT CHANGE UP (ref 0–41)
BILIRUB SERPL-MCNC: 0.5 MG/DL — SIGNIFICANT CHANGE UP (ref 0.2–1.2)
BUN SERPL-MCNC: 38 MG/DL — HIGH (ref 10–20)
CALCIUM SERPL-MCNC: 9.2 MG/DL — SIGNIFICANT CHANGE UP (ref 8.5–10.1)
CHLORIDE SERPL-SCNC: 109 MMOL/L — SIGNIFICANT CHANGE UP (ref 98–110)
CO2 SERPL-SCNC: 21 MMOL/L — SIGNIFICANT CHANGE UP (ref 17–32)
CREAT SERPL-MCNC: 0.8 MG/DL — SIGNIFICANT CHANGE UP (ref 0.7–1.5)
GLUCOSE BLDC GLUCOMTR-MCNC: 115 MG/DL — HIGH (ref 70–99)
GLUCOSE BLDC GLUCOMTR-MCNC: 79 MG/DL — SIGNIFICANT CHANGE UP (ref 70–99)
GLUCOSE BLDC GLUCOMTR-MCNC: 99 MG/DL — SIGNIFICANT CHANGE UP (ref 70–99)
GLUCOSE SERPL-MCNC: 107 MG/DL — HIGH (ref 70–99)
HCT VFR BLD CALC: 29.9 % — LOW (ref 37–47)
HGB BLD-MCNC: 9.4 G/DL — LOW (ref 12–16)
MAGNESIUM SERPL-MCNC: 2.2 MG/DL — SIGNIFICANT CHANGE UP (ref 1.8–2.4)
MCHC RBC-ENTMCNC: 27.5 PG — SIGNIFICANT CHANGE UP (ref 27–31)
MCHC RBC-ENTMCNC: 31.4 G/DL — LOW (ref 32–37)
MCV RBC AUTO: 87.4 FL — SIGNIFICANT CHANGE UP (ref 81–99)
NRBC # BLD: 0 /100 WBCS — SIGNIFICANT CHANGE UP (ref 0–0)
PLATELET # BLD AUTO: 219 K/UL — SIGNIFICANT CHANGE UP (ref 130–400)
POTASSIUM SERPL-MCNC: 4.4 MMOL/L — SIGNIFICANT CHANGE UP (ref 3.5–5)
POTASSIUM SERPL-SCNC: 4.4 MMOL/L — SIGNIFICANT CHANGE UP (ref 3.5–5)
PROT SERPL-MCNC: 5.1 G/DL — LOW (ref 6–8)
RBC # BLD: 3.42 M/UL — LOW (ref 4.2–5.4)
RBC # FLD: 16.4 % — HIGH (ref 11.5–14.5)
SODIUM SERPL-SCNC: 144 MMOL/L — SIGNIFICANT CHANGE UP (ref 135–146)
WBC # BLD: 6.3 K/UL — SIGNIFICANT CHANGE UP (ref 4.8–10.8)
WBC # FLD AUTO: 6.3 K/UL — SIGNIFICANT CHANGE UP (ref 4.8–10.8)

## 2019-10-15 PROCEDURE — 71045 X-RAY EXAM CHEST 1 VIEW: CPT | Mod: 26

## 2019-10-15 PROCEDURE — 99233 SBSQ HOSP IP/OBS HIGH 50: CPT

## 2019-10-15 RX ORDER — ALPRAZOLAM 0.25 MG
0.12 TABLET ORAL AT BEDTIME
Refills: 0 | Status: DISCONTINUED | OUTPATIENT
Start: 2019-10-15 | End: 2019-10-17

## 2019-10-15 RX ORDER — METOPROLOL TARTRATE 50 MG
12.5 TABLET ORAL
Refills: 0 | Status: DISCONTINUED | OUTPATIENT
Start: 2019-10-15 | End: 2019-10-16

## 2019-10-15 RX ORDER — INSULIN GLARGINE 100 [IU]/ML
25 INJECTION, SOLUTION SUBCUTANEOUS EVERY MORNING
Refills: 0 | Status: DISCONTINUED | OUTPATIENT
Start: 2019-10-15 | End: 2019-10-17

## 2019-10-15 RX ADMIN — Medication 100 GRAM(S): at 11:07

## 2019-10-15 RX ADMIN — QUETIAPINE FUMARATE 25 MILLIGRAM(S): 200 TABLET, FILM COATED ORAL at 21:50

## 2019-10-15 RX ADMIN — Medication 0.12 MILLIGRAM(S): at 22:06

## 2019-10-15 RX ADMIN — Medication 600 MILLIGRAM(S): at 06:22

## 2019-10-15 RX ADMIN — Medication 81 MILLIGRAM(S): at 11:07

## 2019-10-15 RX ADMIN — PANTOPRAZOLE SODIUM 40 MILLIGRAM(S): 20 TABLET, DELAYED RELEASE ORAL at 06:23

## 2019-10-15 RX ADMIN — Medication 650 MILLIGRAM(S): at 14:17

## 2019-10-15 RX ADMIN — INSULIN GLARGINE 25 UNIT(S): 100 INJECTION, SOLUTION SUBCUTANEOUS at 08:00

## 2019-10-15 RX ADMIN — ATORVASTATIN CALCIUM 40 MILLIGRAM(S): 80 TABLET, FILM COATED ORAL at 21:50

## 2019-10-15 RX ADMIN — Medication 600 MILLIGRAM(S): at 17:58

## 2019-10-15 RX ADMIN — Medication 30 MILLILITER(S): at 22:11

## 2019-10-15 RX ADMIN — Medication 500 MICROGRAM(S): at 14:10

## 2019-10-15 RX ADMIN — Medication 650 MILLIGRAM(S): at 13:47

## 2019-10-15 RX ADMIN — HEPARIN SODIUM 5000 UNIT(S): 5000 INJECTION INTRAVENOUS; SUBCUTANEOUS at 06:23

## 2019-10-15 RX ADMIN — Medication 40 MILLIGRAM(S): at 06:22

## 2019-10-15 RX ADMIN — CHLORHEXIDINE GLUCONATE 1 APPLICATION(S): 213 SOLUTION TOPICAL at 06:22

## 2019-10-15 RX ADMIN — Medication 12.5 MILLIGRAM(S): at 08:46

## 2019-10-15 RX ADMIN — Medication 500 MICROGRAM(S): at 08:11

## 2019-10-15 RX ADMIN — TAMSULOSIN HYDROCHLORIDE 0.4 MILLIGRAM(S): 0.4 CAPSULE ORAL at 21:50

## 2019-10-15 RX ADMIN — HEPARIN SODIUM 5000 UNIT(S): 5000 INJECTION INTRAVENOUS; SUBCUTANEOUS at 17:58

## 2019-10-15 RX ADMIN — Medication 12.5 MILLIGRAM(S): at 17:58

## 2019-10-15 NOTE — PROGRESS NOTE ADULT - SUBJECTIVE AND OBJECTIVE BOX
SUBJ:  No new complains    MEDICATIONS  (STANDING):  aspirin enteric coated 81 milliGRAM(s) Oral daily  atorvastatin 40 milliGRAM(s) Oral at bedtime  chlorhexidine 4% Liquid 1 Application(s) Topical <User Schedule>  dextrose 40% Gel 15 Gram(s) Oral once  dextrose 5%. 1000 milliLiter(s) (50 mL/Hr) IV Continuous <Continuous>  dextrose 50% Injectable 50 milliLiter(s) IV Push every 15 minutes  dextrose 50% Injectable 25 milliLiter(s) IV Push every 15 minutes  furosemide    Tablet 40 milliGRAM(s) Oral daily  guaiFENesin  milliGRAM(s) Oral every 12 hours  heparin  Injectable 5000 Unit(s) SubCutaneous every 12 hours  insulin glargine Injectable (LANTUS) 25 Unit(s) SubCutaneous every morning  ipratropium    for Nebulization 500 MICROGram(s) Nebulizer every 6 hours  magnesium sulfate  IVPB 1 Gram(s) IV Intermittent daily  metoprolol tartrate 12.5 milliGRAM(s) Oral two times a day  pantoprazole    Tablet 40 milliGRAM(s) Oral before breakfast  QUEtiapine 25 milliGRAM(s) Oral at bedtime  sodium chloride 0.9%. 1000 milliLiter(s) (10 mL/Hr) IV Continuous <Continuous>  tamsulosin 0.4 milliGRAM(s) Oral at bedtime  tiotropium 18 MICROgram(s) Capsule 1 Capsule(s) Inhalation daily    MEDICATIONS  (PRN):  acetaminophen   Tablet .. 650 milliGRAM(s) Oral every 6 hours PRN Mild Pain (1 - 3)  ALPRAZolam 0.125 milliGRAM(s) Oral at bedtime PRN sleep  dextrose 40% Gel 15 Gram(s) Oral once PRN Blood Glucose LESS THAN 70 milliGRAM(s)/deciliter  gabapentin 100 milliGRAM(s) Oral two times a day PRN leg pain  glucagon  Injectable 1 milliGRAM(s) IntraMuscular once PRN Glucose LESS THAN 70 milligrams/deciliter  traMADol 50 milliGRAM(s) Oral every 4 hours PRN Moderate Pain (4 - 6)  traMADol 25 milliGRAM(s) Oral every 4 hours PRN Mild Pain (1 - 3)            Vital Signs Last 24 Hrs  T(C): 36.1 (15 Oct 2019 08:00), Max: 36.8 (14 Oct 2019 20:00)  T(F): 96.9 (15 Oct 2019 08:00), Max: 98.2 (14 Oct 2019 20:00)  HR: 108 (15 Oct 2019 10:00) (80 - 111)  BP: 98/61 (15 Oct 2019 10:00) (83/54 - 120/66)  BP(mean): 84 (15 Oct 2019 09:00) (64 - 88)  RR: 24 (15 Oct 2019 10:00) (17 - 29)  SpO2: 85% (15 Oct 2019 10:00) (85% - 100%)     REVIEW OF SYSTEMS:  CONSTITUTIONAL: No fever, weight loss, or fatigue  CARDIOLOGY: PAtient denies chest pain, shortness of breath or syncopal episodes.   RESPIRATORY: denies shortness of breath, wheezeing.   NEUROLOGICAL: NO weakness, no focal deficits to report.  ENDOCRINOLOGICAL: no recent change in diabetic medications.   GI: no BRBPR, no N,V,diarrhea.    PSYCHIATRY: normal mood and affect  HEENT: no nasal discharge, no ecchymosis  SKIN: no ecchymosis, no breakdown  MUSCULOSKELETAL: Full range of motion x4.        PHYSICAL EXAM:  · CONSTITUTIONAL:	Well-developed, well nourished    BMI-  ·RESPIRATORY:   airway patent; breath sounds equal; good air movement; respirations non-labored; clear to auscultation bilaterally; no chest wall tenderness; no intercostal retractions; no rales,rhonchi or wheeze  · CARDIOVASCULAR	regular rate and rhythm  no rub  no murmur  normal PMI  · EXTREMITIES: No cyanosis, clubbing or edema  · VASCULAR: 	Equal and normal pulses (carotid, femoral, dorsalis pedis)  	  TELEMETRY:    ECG:    TTE:    LABS:                        9.4    6.30  )-----------( 219      ( 15 Oct 2019 00:50 )             29.9     10-15    144  |  109  |  38<H>  ----------------------------<  107<H>  4.4   |  21  |  0.8    Ca    9.2      15 Oct 2019 08:34  Mg     2.2     10-15    TPro  5.1<L>  /  Alb  3.1<L>  /  TBili  0.5  /  DBili  x   /  AST  11  /  ALT  9   /  AlkPhos  134<H>  10-15            I&O's Summary    14 Oct 2019 07:01  -  15 Oct 2019 07:00  --------------------------------------------------------  IN: 1300 mL / OUT: 1850 mL / NET: -550 mL    15 Oct 2019 07:01  -  15 Oct 2019 10:31  --------------------------------------------------------  IN: 120 mL / OUT: 550 mL / NET: -430 mL      BNP  RADIOLOGY & ADDITIONAL STUDIES:    IMPRESSION AND PLAN:    Patient is in Afib with RVR  suggest loading with amiodarone  discussed with CTU

## 2019-10-15 NOTE — PROGRESS NOTE ADULT - SUBJECTIVE AND OBJECTIVE BOX
GUS WILBURN  68y, Female  Allergy: No Known Allergies      CHIEF COMPLAINT: HFrEF (15 Oct 2019 10:31)      INTERVAL EVENTS/HPI  - No acute events overnight  - T(F): , Max: 98.2 (10-14-19 @ 20:00)  - Denies any worsening symptoms  - Tolerating medication  - WBC Count: 6.30 (10-15-19 @ 00:50)      ROS  General: Denies rigors, nightsweats  HEENT: Denies headache, rhinorrhea, sore throat, eye pain  CV: Denies CP, palpitations  PULM: Denies SOB, wheezing  GI: Denies hematemesis, hematochezia, melena  : Denies discharge, hematuria  MSK: Denies arthralgias, myalgias  SKIN: Denies rash, lesions  NEURO: Denies paresthesias, weakness  PSYCH: Denies depression, anxiety    VITALS:  T(F): 96.9, Max: 98.2 (10-14-19 @ 20:00)  HR: 98  BP: 98/54  RR: 20Vital Signs Last 24 Hrs  T(C): 36.1 (15 Oct 2019 08:00), Max: 36.8 (14 Oct 2019 20:00)  T(F): 96.9 (15 Oct 2019 08:00), Max: 98.2 (14 Oct 2019 20:00)  HR: 98 (15 Oct 2019 11:00) (88 - 111)  BP: 98/54 (15 Oct 2019 11:00) (83/54 - 120/66)  BP(mean): 84 (15 Oct 2019 09:00) (64 - 88)  RR: 20 (15 Oct 2019 11:00) (17 - 29)  SpO2: 99% (15 Oct 2019 11:00) (96% - 100%)    PHYSICAL EXAM:  Gen: NAD, resting in bed  HEENT: Normocephalic, atraumatic  Neck: supple, no lymphadenopathy  CV: Regular rate & regular rhythm incision cdi  Lungs: decreased BS at bases, no fremitus  Abdomen: Soft, BS present  Ext: Warm, well perfused  Neuro: non focal, awake  Skin: sacral erythema and breakdown  Lines: no phlebitis  montejo    FH: Non-contributory  Social Hx: Non-contributory    TESTS & MEASUREMENTS:                        9.4    6.30  )-----------( 219      ( 15 Oct 2019 00:50 )             29.9     10-15    144  |  109  |  38<H>  ----------------------------<  107<H>  4.4   |  21  |  0.8    Ca    9.2      15 Oct 2019 08:34  Mg     2.2     10-15    TPro  5.1<L>  /  Alb  3.1<L>  /  TBili  0.5  /  DBili  x   /  AST  11  /  ALT  9   /  AlkPhos  134<H>  10-15    eGFR if Non African American: 77 mL/min/1.73M2 (10-15-19 @ 08:34)  eGFR if : 89 mL/min/1.73M2 (10-15-19 @ 08:34)    LIVER FUNCTIONS - ( 15 Oct 2019 08:34 )  Alb: 3.1 g/dL / Pro: 5.1 g/dL / ALK PHOS: 134 U/L / ALT: 9 U/L / AST: 11 U/L / GGT: x           Urinalysis Basic - ( 13 Oct 2019 13:40 )    Color: Light Yellow / Appearance: Clear / S.009 / pH: x  Gluc: x / Ketone: Negative  / Bili: Negative / Urobili: <2 mg/dL   Blood: x / Protein: Trace / Nitrite: Negative   Leuk Esterase: Small / RBC: 6 /HPF / WBC 6 /HPF   Sq Epi: x / Non Sq Epi: 1 /HPF / Bacteria: Negative        Culture - Urine (collected 10-13-19 @ 10:54)  Source: .Urine Catheterized  Final Report (10-14-19 @ 13:37):    <10,000 CFU/mL Normal Urogenital Ewa            INFECTIOUS DISEASES TESTING  MRSA PCR Result.: Negative (10-06-19 @ 20:00)      RADIOLOGY & ADDITIONAL TESTS:  I have personally reviewed the last Chest xray  CXR      CT      CARDIOLOGY TESTING  12 Lead ECG:   Ventricular Rate 75 BPM    Atrial Rate 256 BPM    QRS Duration 134 ms    Q-T Interval 392 ms    QTC Calculation(Bezet) 437 ms    P Axis 80 degrees    R Axis -55 degrees    T Axis -26 degrees    Diagnosis Line Atrial fibrillation  Left axis deviation  Non-specific intra-ventricular conduction block  Nonspecific T wave abnormality  Abnormal ECG    Confirmed by SPRING ARRINGTON MD ([id number]) on 10/13/2019 8:56:08 AM (10-13-19 @ 08:10)  Transthoracic Echocardiogram:    EXAM:  2-D ECHO (TTE) COMPLETE        PROCEDURE DATE:  10/12/2019      INTERPRETATION:  REPORT:    TRANSTHORACIC ECHOCARDIOGRAM REPORT         Patient Name:   GUS WILBURN Accession #: [id number]  Medical Rec #:  [id number]    Height:      64.0 in 162.6 cm  YOB: 1950  Weight:      126.0 lb 57.15 kg  Patient Age:    68 years    BSA:         1.61 m²  Patient Gender: F           BP:          96/56 mmHg       Date of Exam:        10/12/2019 1:14:04 PM  Referring Physician: [id number] ERNESTO CATALAN  Sonographer:         Katherine Mccarty  Reading Physician:   Spring Arrington M.D.    Procedure:     2D Echo/Doppler/Color Doppler Complete.  Indications:   I25.9 - Chronic Ischemic Heart Disease, unspecified  Diagnosis:     Chronic ischemic heart disease, unspecified - I25.9  Study Details: Technically adequate study. Study quality was adversely   affected                 due to the patient being uncooperative.         Summary:   1. Severely decreased global left ventricular systolic function.   2. LV Ejection Fraction by Renner's Method with a biplane EF of 22 %.   3. Normal left ventricular internal cavity size.   4. Spectral Doppler shows impaired relaxation pattern of left   ventricular myocardial filling (Grade I diastolic dysfunction).   5. Moderately reduced RV systolic function.   6. Normal right atrial size.   7. Trivial pericardial effusion.   8. Mild to moderate mitral annular calcification.   9. Moderate thickening and calcification of the anterior and posterior   mitral valve leaflets.  10. Moderate to severe mitral valve regurgitation.  11. Mild-moderate tricuspid regurgitation.  12. Peak transaortic gradient equals 20.1 mmHg, mean transaortic gradient   equals 8.4 mmHg, the calculated aortic valve area equals 1.29 cm² by the   continuity equation consistent with moderate aortic stenosis.    PHYSICIAN INTERPRETATION:  Left Ventricle: The left ventricular internal cavity size is normal.   Global LV systolic function was severely decreased. Spectral Doppler   shows impaired relaxation pattern of left ventricular myocardial filling   (Grade I diastolic dysfunction).  Right Ventricle: The right ventricular size is normal. RV systolic   function is moderately reduced.  Left Atrium: Moderately enlarged left atrium.  Right Atrium: Normal right atrial size.  Pericardium: Trivial pericardial effusion is present.  Mitral Valve: Moderate thickening and calcification of the anterior and   posterior mitral valve leaflets. There is mild to moderate mitral annular   calcification. Moderate to severe mitral valve regurgitation is seen.  Tricuspid Valve: The tricuspid valve is normal in structure.   Mild-moderate tricuspid regurgitation is visualized.  Aortic Valve: The aortic valve is trileaflet. Peak transaortic gradient   equals 20.1 mmHg, mean transaortic gradient equals 8.4 mmHg, the   calculated aortic valve area equals 1.29 cm² by the continuity equation   consistent with moderate aortic stenosis. No evidence of aortic valve   regurgitation is seen.  Pulmonic Valve: The pulmonic valve is thickened with good excursion. Mild   pulmonic valve regurgitation.  Aorta: Aortic root measured at Sinus of Valsalva is normal.  Venous: The inferior vena cava was dilated, with respiratory size   variation less than 50%.       2D AND M-MODE MEASUREMENTS (normal ranges within parentheses):  Left                  Normal   Aorta/Left             Normal  Ventricle:                     Atrium:  IVSd (2D):  1.00 cm  (0.7-1.1) AoV Cusp       1.31  (1.5-2.6)  LVPWd (2D): 0.91 cm  (0.7-1.1) Separation:     cm  LVIDd (2D): 5.10 cm  (3.4-5.7) Left Atrium    5.00  (1.9-4.0)  LVIDs (2D): 4.44 cm            (Mmode):        cm  LV FS (2D):  12.8 %   (>25%)   LA Volume      45.6  IVSd        0.89 cm  (0.7-1.1) Index ml/m²  (Mmode):                       Right  LVPWd       0.89 cm  (0.7-1.1) Ventricle:  (Mmode):                       RVd (Mmode):   1.66 cm  LVIDd       5.53 cm  (3.4-5.7) RVd (2D):      2.38 cm  (Mmode):  LVIDs       5.02 cm  (Mmode):  LV FS  9.3 %    (>25%)  (Mmode):  Relative      0.36    (<0.42)  Wall  Thickness  Rel. Wall     0.32    (<0.42)  Thickness  Mm  LV Mass      114.3  Index:        g/m²  Mmode    SPECTRAL DOPPLER ANALYSIS:  LV DIASTOLIC FUNCTION:  MV Peak E: 1.05 m/s Decel Time: 122 msec  MV Peak A: 0.53 m/s  E/A Ratio: 1.98    Aortic Valve:  AoV VMax:    2.24 m/s  AoV Area, Vmax: 1.25 cm² Vmax Indx: 0.78 cm²/m²  AoV VTI:     0.32 m    AoV Area, VTI:  1.29 cm² VTI Indx:  0.80 cm²/m²  AoV Pk Grad: 20.1 mmHg  AoV Mn Grad:8.4 mmHg    LVOT Vmax: 0.75 m/s  LVOT VTI:  0.11 m  LVOT Diam: 2.18 cm    Mitral Valve:  MV P1/2 Time: 35.38 msec  MV Area, PHT: 6.22 cm²    Tricuspid Valve and PA/RV Systolic Pressure: TR Max Velocity: 2.65 m/s RA   Pressure:  RVSP/PASP: 27.8 mmHg    Pulmonic Valve:  PV Max Velocity: 0.97 m/s PV Max PG: 3.8 mmHg PV Mean PG:       [id number] Spring Arrington M.D., Electronically signed on 10/13/2019 at   6:47:03 AM              *** Final ***                    SPRING ARRINGTON MD  This document has been electronically signed. Oct 12 2019  1:14PM             (10-12-19 @ 13:14)      MEDICATIONS  aspirin enteric coated 81  atorvastatin 40  chlorhexidine 4% Liquid 1  dextrose 40% Gel 15  dextrose 5%. 1000  dextrose 50% Injectable 50  dextrose 50% Injectable 25  furosemide    Tablet 40  guaiFENesin   heparin  Injectable 5000  insulin glargine Injectable (LANTUS) 25  ipratropium    for Nebulization 500  magnesium sulfate  IVPB 1  metoprolol tartrate 12.5  pantoprazole    Tablet 40  QUEtiapine 25  sodium chloride 0.9%. 1000  tamsulosin 0.4  tiotropium 18 MICROgram(s) Capsule 1      ANTIBIOTICS:      All available historical records have been reviewed

## 2019-10-15 NOTE — PROGRESS NOTE ADULT - SUBJECTIVE AND OBJECTIVE BOX
69 yo F w uncontrolled DM (non-compliant), viral myopathy, Paroxysmal Afib, recent labial abscess s/p ID, hip fracture s/p fixation, recent LGIB bleed now presents for shortness of breath. Patient has been having shortness of breath for the last day. Occurs on exertion and stationary however no worsening of breathing when laying down. Patient denied abdominal pain however family reports she has been having some abdominal discomfort over the last day. Patient also has an indwelling Montejo for the last 3 weeks for urinary retention. Reports cough with no sputum production. Denies chest pain, fever, nausea/vomiting, diarrhea. CC- Ischemic Cardiomyopathy, NSTEMI, Severe CAD with LM/TVD and strong fam history of CAD.    POD # 8 CABG x3, EF 20-25% and left atrial appendage clipping  Montejo was changed by GYN 10/13. UA negative. Urine c/s NGTD.  Improved Mental status post d/c baclofen and xanax. Seroquel was started   Rate controlled A fib    Vital Signs Last 24 Hrs  T(C): 36.1 (15 Oct 2019 08:00), Max: 36.8 (14 Oct 2019 20:00)  T(F): 96.9 (15 Oct 2019 08:00), Max: 98.2 (14 Oct 2019 20:00)  HR: 111 (15 Oct 2019 08:00) (80 - 111)  BP: 118/59 (15 Oct 2019 08:00) (83/54 - 120/66)  BP(mean): 85 (15 Oct 2019 08:00) (64 - 88)  RR: 25 (15 Oct 2019 08:00) (17 - 29)  SpO2: 100% (15 Oct 2019 08:00) (96% - 100%)    alert, awake, verbal - easily falls asleep  follows commands appropriately  no JVD  Clean sternal incision  S1S2 irreg rate and rhythm  b/l air entry, no wheeze,   soft abdomen, non tender, non distended  warm periphery with + distal pulses, b/l pitting edema    WBC 6.3, Hg 9.4, plt 219  BUN 38, Cr 0.8  Alk Phos 134    CXR - bilat pleurl effusions, mild b/l congestion    a/p:    CAD s/p CABG  Rate controlled A fib  Encephalopathy likely due to meds (xanax, baclofen)  Acute on Chr. Hypoxic Resp failure  Uterine prolopase    PO PPI, PO ASA 81 mg, Hep SC q8  Statin 40 mg QD, Change to metop 12.5 bid  cont with Lasix 40 mg PO QD  Lantus 25  Resume Xanax qhs at 0.125 mg   keep montejo, cont with tamsulosin.   A fib is rate controlled with clipped left atrial appendage. No a/c at this time  Wean off NC. Currently on 1-2 liters.  OOB to chair  glycemic control, DVT proph  .

## 2019-10-15 NOTE — PROGRESS NOTE ADULT - ASSESSMENT
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 Denies abd pain. Diarrhea is watery, no foul smell.     IMPRESSION  #Toxic encephalopathy 2/2 polypharmacy    UCX NG  #Diarrhea, likely melena 2/2 GIB, lower suspicion for recurrent Cdiff as recent kayexalate, no abd pain, no foul smell. Diarrhea has resolved    +Leukocytosis could be stress related 2/2 OR and recent steroids, downtrending     PO vanc ended 10/5    RECOMMENDATIONS  - Monitor off abx  - Local wound care and offloading for sacral decub

## 2019-10-15 NOTE — PROGRESS NOTE ADULT - SUBJECTIVE AND OBJECTIVE BOX
OPERATIVE PROCEDURE(s):  CABG              POD # 8                      68yFemale  SURGEON(s): CHINA Escobar  SUBJECTIVE ASSESSMENT:  Patient has no complaints at this time.    Vital Signs Last 24 Hrs  T(F): 97.3 (15 Oct 2019 12:00), Max: 98.2 (14 Oct 2019 20:00)  HR: 96 (15 Oct 2019 18:00) (90 - 111)  BP: 105/58 (15 Oct 2019 18:00) (83/54 - 119/61)  BP(mean): 76 (15 Oct 2019 18:00) (64 - 88)  RR: 20 (15 Oct 2019 15:00) (17 - 29)  SpO2: 98% (15 Oct 2019 18:00) (96% - 100%)      I&O's Detail    14 Oct 2019 07:  -  15 Oct 2019 07:00  --------------------------------------------------------  IN:    IV PiggyBack: 100 mL    Oral Fluid: 1200 mL  Total IN: 1300 mL    OUT:    Indwelling Catheter - Urethral: 1850 mL  Total OUT: 1850 mL        Net:   I&O's Detail    13 Oct 2019 07:01  -  14 Oct 2019 07:00  --------------------------------------------------------  Total NET: -1375 mL      14 Oct 2019 07:01  -  15 Oct 2019 07:00  --------------------------------------------------------  Total NET: -550 mL        CAPILLARY BLOOD GLUCOSE  99 (15 Oct 2019 11:00)      POCT Blood Glucose.: 99 mg/dL (15 Oct 2019 11:27)  POCT Blood Glucose.: 79 mg/dL (15 Oct 2019 06:04)  POCT Blood Glucose.: 117 mg/dL (14 Oct 2019 21:48)    Physical Exam:  General: NAD; A&Ox3  Cardiac: S1/S2, RRR, no murmur, no rubs  Lungs: unlabored respirations, CTA b/l, no wheeze, no rales, no crackles  Abdomen: Soft/NT/ND; positive bowel sounds x 4  Sternum: Intact, no click, incision healing well with no drainage  Incisions: Incisions clean/dry/intact  Extremities: No edema b/l lower extremities; good capillary refill; no cyanosis; palpable 1+ pedal pulses b/l        LABS:                        9.4<L>  6.30  )-----------( 219      ( 15 Oct 2019 00:50 )             29.9<L>                        9.6<L>  7.51  )-----------( 206      ( 13 Oct 2019 22:46 )             30.1<L>    10-15    144  |  109  |  38<H>  ----------------------------<  107<H>  4.4   |  21  |  0.8  10-13    144  |  108  |  41<H>  ----------------------------<  124<H>  4.6   |  24  |  0.8    Ca    9.2      15 Oct 2019 08:34  Mg     2.2     10-15    TPro  5.1<L> [6.0 - 8.0]  /  Alb  3.1<L> [3.5 - 5.2]  /  TBili  0.5 [0.2 - 1.2]  /  DBili  x   /  AST  11 [0 - 41]  /  ALT  9 [0 - 41]  /  AlkPhos  134<H> [30 - 115]  10-15      Urinalysis Basic - ( 13 Oct 2019 13:40 )    Color: Light Yellow / Appearance: Clear / S.009 / pH: x  Gluc: x / Ketone: Negative  / Bili: Negative / Urobili: <2 mg/dL   Blood: x / Protein: Trace / Nitrite: Negative   Leuk Esterase: Small / RBC: 6 /HPF / WBC 6 /HPF   Sq Epi: x / Non Sq Epi: 1 /HPF / Bacteria: Negative    MEDICATIONS  (STANDING):  aspirin enteric coated 81 milliGRAM(s) Oral daily  atorvastatin 40 milliGRAM(s) Oral at bedtime  chlorhexidine 4% Liquid 1 Application(s) Topical <User Schedule>  dextrose 40% Gel 15 Gram(s) Oral once  dextrose 5%. 1000 milliLiter(s) (50 mL/Hr) IV Continuous <Continuous>  dextrose 50% Injectable 50 milliLiter(s) IV Push every 15 minutes  dextrose 50% Injectable 25 milliLiter(s) IV Push every 15 minutes  furosemide    Tablet 40 milliGRAM(s) Oral daily  guaiFENesin  milliGRAM(s) Oral every 12 hours  heparin  Injectable 5000 Unit(s) SubCutaneous every 12 hours  insulin glargine Injectable (LANTUS) 25 Unit(s) SubCutaneous every morning  ipratropium    for Nebulization 500 MICROGram(s) Nebulizer every 6 hours  magnesium sulfate  IVPB 1 Gram(s) IV Intermittent daily  metoprolol tartrate 12.5 milliGRAM(s) Oral two times a day  pantoprazole    Tablet 40 milliGRAM(s) Oral before breakfast  QUEtiapine 25 milliGRAM(s) Oral at bedtime  sodium chloride 0.9%. 1000 milliLiter(s) (10 mL/Hr) IV Continuous <Continuous>  tamsulosin 0.4 milliGRAM(s) Oral at bedtime  tiotropium 18 MICROgram(s) Capsule 1 Capsule(s) Inhalation daily    MEDICATIONS  (PRN):  acetaminophen   Tablet .. 650 milliGRAM(s) Oral every 6 hours PRN Mild Pain (1 - 3)  ALPRAZolam 0.125 milliGRAM(s) Oral at bedtime PRN sleep  dextrose 40% Gel 15 Gram(s) Oral once PRN Blood Glucose LESS THAN 70 milliGRAM(s)/deciliter  gabapentin 100 milliGRAM(s) Oral two times a day PRN leg pain  glucagon  Injectable 1 milliGRAM(s) IntraMuscular once PRN Glucose LESS THAN 70 milligrams/deciliter  traMADol 50 milliGRAM(s) Oral every 4 hours PRN Moderate Pain (4 - 6)  traMADol 25 milliGRAM(s) Oral every 4 hours PRN Mild Pain (1 - 3)      Allergies:  No Known Allergies      Assessment/Plan:  68y Female status-post CABG  - Case and plan discussed with CTU Intensivist and CT Surgeon - Dr. Urbano/Flaco/Shawn   - Continue CTU supportive care    - Continue DVT/GI prophylaxis  - Incentive Spirometry 10 times an hour  - Continue to advance physical activity as tolerated and continue PT/OT as directed  1. CAD: Continue ASA, statin, BB  2. Wound care nurse to eval stage II sacral decubitus  3. Xanax 0.125mh qhs

## 2019-10-16 LAB
ALBUMIN SERPL ELPH-MCNC: 3.5 G/DL — SIGNIFICANT CHANGE UP (ref 3.5–5.2)
ALP SERPL-CCNC: 158 U/L — HIGH (ref 30–115)
ALT FLD-CCNC: 11 U/L — SIGNIFICANT CHANGE UP (ref 0–41)
ANION GAP SERPL CALC-SCNC: 12 MMOL/L — SIGNIFICANT CHANGE UP (ref 7–14)
AST SERPL-CCNC: 14 U/L — SIGNIFICANT CHANGE UP (ref 0–41)
BILIRUB SERPL-MCNC: 0.6 MG/DL — SIGNIFICANT CHANGE UP (ref 0.2–1.2)
BUN SERPL-MCNC: 36 MG/DL — HIGH (ref 10–20)
CALCIUM SERPL-MCNC: 9.2 MG/DL — SIGNIFICANT CHANGE UP (ref 8.5–10.1)
CHLORIDE SERPL-SCNC: 109 MMOL/L — SIGNIFICANT CHANGE UP (ref 98–110)
CO2 SERPL-SCNC: 24 MMOL/L — SIGNIFICANT CHANGE UP (ref 17–32)
CREAT SERPL-MCNC: 0.7 MG/DL — SIGNIFICANT CHANGE UP (ref 0.7–1.5)
GLUCOSE BLDC GLUCOMTR-MCNC: 109 MG/DL — HIGH (ref 70–99)
GLUCOSE BLDC GLUCOMTR-MCNC: 52 MG/DL — LOW (ref 70–99)
GLUCOSE BLDC GLUCOMTR-MCNC: 76 MG/DL — SIGNIFICANT CHANGE UP (ref 70–99)
GLUCOSE BLDC GLUCOMTR-MCNC: 84 MG/DL — SIGNIFICANT CHANGE UP (ref 70–99)
GLUCOSE BLDC GLUCOMTR-MCNC: 93 MG/DL — SIGNIFICANT CHANGE UP (ref 70–99)
GLUCOSE BLDC GLUCOMTR-MCNC: 97 MG/DL — SIGNIFICANT CHANGE UP (ref 70–99)
GLUCOSE SERPL-MCNC: 102 MG/DL — HIGH (ref 70–99)
HCT VFR BLD CALC: 32.5 % — LOW (ref 37–47)
HGB BLD-MCNC: 10 G/DL — LOW (ref 12–16)
MCHC RBC-ENTMCNC: 27.2 PG — SIGNIFICANT CHANGE UP (ref 27–31)
MCHC RBC-ENTMCNC: 30.8 G/DL — LOW (ref 32–37)
MCV RBC AUTO: 88.6 FL — SIGNIFICANT CHANGE UP (ref 81–99)
NRBC # BLD: 0 /100 WBCS — SIGNIFICANT CHANGE UP (ref 0–0)
PLATELET # BLD AUTO: 251 K/UL — SIGNIFICANT CHANGE UP (ref 130–400)
POTASSIUM SERPL-MCNC: 4.5 MMOL/L — SIGNIFICANT CHANGE UP (ref 3.5–5)
POTASSIUM SERPL-SCNC: 4.5 MMOL/L — SIGNIFICANT CHANGE UP (ref 3.5–5)
PROT SERPL-MCNC: 5.6 G/DL — LOW (ref 6–8)
RBC # BLD: 3.67 M/UL — LOW (ref 4.2–5.4)
RBC # FLD: 16.6 % — HIGH (ref 11.5–14.5)
SODIUM SERPL-SCNC: 145 MMOL/L — SIGNIFICANT CHANGE UP (ref 135–146)
WBC # BLD: 5 K/UL — SIGNIFICANT CHANGE UP (ref 4.8–10.8)
WBC # FLD AUTO: 5 K/UL — SIGNIFICANT CHANGE UP (ref 4.8–10.8)

## 2019-10-16 PROCEDURE — 71045 X-RAY EXAM CHEST 1 VIEW: CPT | Mod: 26

## 2019-10-16 PROCEDURE — 99232 SBSQ HOSP IP/OBS MODERATE 35: CPT

## 2019-10-16 PROCEDURE — 99233 SBSQ HOSP IP/OBS HIGH 50: CPT | Mod: 25

## 2019-10-16 PROCEDURE — 32555 ASPIRATE PLEURA W/ IMAGING: CPT

## 2019-10-16 RX ORDER — DEXTROSE 50 % IN WATER 50 %
25 SYRINGE (ML) INTRAVENOUS ONCE
Refills: 0 | Status: COMPLETED | OUTPATIENT
Start: 2019-10-16 | End: 2019-10-16

## 2019-10-16 RX ORDER — CARVEDILOL PHOSPHATE 80 MG/1
3.12 CAPSULE, EXTENDED RELEASE ORAL EVERY 12 HOURS
Refills: 0 | Status: DISCONTINUED | OUTPATIENT
Start: 2019-10-16 | End: 2019-10-18

## 2019-10-16 RX ORDER — ZINC SULFATE TAB 220 MG (50 MG ZINC EQUIVALENT) 220 (50 ZN) MG
220 TAB ORAL DAILY
Refills: 0 | Status: DISCONTINUED | OUTPATIENT
Start: 2019-10-16 | End: 2019-10-18

## 2019-10-16 RX ORDER — FUROSEMIDE 40 MG
40 TABLET ORAL
Refills: 0 | Status: DISCONTINUED | OUTPATIENT
Start: 2019-10-16 | End: 2019-10-18

## 2019-10-16 RX ORDER — AMIODARONE HYDROCHLORIDE 400 MG/1
200 TABLET ORAL DAILY
Refills: 0 | Status: DISCONTINUED | OUTPATIENT
Start: 2019-10-16 | End: 2019-10-18

## 2019-10-16 RX ORDER — CARVEDILOL PHOSPHATE 80 MG/1
3.12 CAPSULE, EXTENDED RELEASE ORAL ONCE
Refills: 0 | Status: COMPLETED | OUTPATIENT
Start: 2019-10-16 | End: 2019-10-16

## 2019-10-16 RX ADMIN — Medication 600 MILLIGRAM(S): at 17:57

## 2019-10-16 RX ADMIN — INSULIN GLARGINE 25 UNIT(S): 100 INJECTION, SOLUTION SUBCUTANEOUS at 09:26

## 2019-10-16 RX ADMIN — Medication 81 MILLIGRAM(S): at 11:30

## 2019-10-16 RX ADMIN — Medication 650 MILLIGRAM(S): at 11:36

## 2019-10-16 RX ADMIN — Medication 500 MICROGRAM(S): at 08:40

## 2019-10-16 RX ADMIN — Medication 40 MILLIGRAM(S): at 17:57

## 2019-10-16 RX ADMIN — Medication 25 MILLILITER(S): at 17:40

## 2019-10-16 RX ADMIN — Medication 600 MILLIGRAM(S): at 05:27

## 2019-10-16 RX ADMIN — QUETIAPINE FUMARATE 25 MILLIGRAM(S): 200 TABLET, FILM COATED ORAL at 21:34

## 2019-10-16 RX ADMIN — Medication 100 GRAM(S): at 11:36

## 2019-10-16 RX ADMIN — AMIODARONE HYDROCHLORIDE 200 MILLIGRAM(S): 400 TABLET ORAL at 11:33

## 2019-10-16 RX ADMIN — ATORVASTATIN CALCIUM 40 MILLIGRAM(S): 80 TABLET, FILM COATED ORAL at 21:34

## 2019-10-16 RX ADMIN — CARVEDILOL PHOSPHATE 3.12 MILLIGRAM(S): 80 CAPSULE, EXTENDED RELEASE ORAL at 10:05

## 2019-10-16 RX ADMIN — TAMSULOSIN HYDROCHLORIDE 0.4 MILLIGRAM(S): 0.4 CAPSULE ORAL at 21:34

## 2019-10-16 RX ADMIN — HEPARIN SODIUM 5000 UNIT(S): 5000 INJECTION INTRAVENOUS; SUBCUTANEOUS at 05:28

## 2019-10-16 RX ADMIN — Medication 650 MILLIGRAM(S): at 12:06

## 2019-10-16 RX ADMIN — PANTOPRAZOLE SODIUM 40 MILLIGRAM(S): 20 TABLET, DELAYED RELEASE ORAL at 06:34

## 2019-10-16 RX ADMIN — Medication 40 MILLIGRAM(S): at 05:27

## 2019-10-16 RX ADMIN — Medication 12.5 MILLIGRAM(S): at 05:27

## 2019-10-16 RX ADMIN — CHLORHEXIDINE GLUCONATE 1 APPLICATION(S): 213 SOLUTION TOPICAL at 05:29

## 2019-10-16 RX ADMIN — CARVEDILOL PHOSPHATE 3.12 MILLIGRAM(S): 80 CAPSULE, EXTENDED RELEASE ORAL at 17:58

## 2019-10-16 RX ADMIN — HEPARIN SODIUM 5000 UNIT(S): 5000 INJECTION INTRAVENOUS; SUBCUTANEOUS at 17:58

## 2019-10-16 NOTE — PROGRESS NOTE ADULT - ASSESSMENT
65 y/o F with h/o DM admitted with c/o acute onset of SOB associated with nausea, back pain and weakness. Patient with positive troponin on admission and LVEF of ~ 15%. 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 Patient underwent CABG and NUBIA clipping. Post op course complicated by GI bleeding and diarrhea; she was treated for Cdiff.  Patient is hemodynamically stable off pressors, EF ~ 20% with elevated filling pressures and plethoric IVC.

## 2019-10-16 NOTE — ADVANCED PRACTICE NURSE CONSULT - REASON FOR CONSULT
Skin Integrity challenge , despite preventive measures   Potential risk for breakdown due to multiple risk factors    Heart failure and recent hip repair Gluteal/ Perineal  maceration   Skin Integrity challenge , despite preventive measures   Potential risk for breakdown due to multiple risk factors    Heart failure and recent hip repair

## 2019-10-16 NOTE — PROGRESS NOTE ADULT - RESPIRATORY COMMENTS
Decreased respiratory sounds on left base, no crackles heard
No crackles
Decreased RS in left base, no crackles heard

## 2019-10-16 NOTE — CONSULT NOTE ADULT - PROVIDER SPECIALTY LIST ADULT
CT Surgery
Cardiology
Critical Care
Electrophysiology
GYN
Heart Failure
Endocrinology
Gastroenterology
Nutrition Support
Physiatry
Infectious Disease

## 2019-10-16 NOTE — PROGRESS NOTE ADULT - SUBJECTIVE AND OBJECTIVE BOX
SUBJ:No new complains      MEDICATIONS  (STANDING):  amiodarone    Tablet 200 milliGRAM(s) Oral daily  aspirin enteric coated 81 milliGRAM(s) Oral daily  atorvastatin 40 milliGRAM(s) Oral at bedtime  carvedilol 3.125 milliGRAM(s) Oral every 12 hours  chlorhexidine 4% Liquid 1 Application(s) Topical <User Schedule>  dextrose 40% Gel 15 Gram(s) Oral once  dextrose 5%. 1000 milliLiter(s) (50 mL/Hr) IV Continuous <Continuous>  dextrose 50% Injectable 50 milliLiter(s) IV Push every 15 minutes  dextrose 50% Injectable 25 milliLiter(s) IV Push every 15 minutes  furosemide    Tablet 40 milliGRAM(s) Oral two times a day  guaiFENesin  milliGRAM(s) Oral every 12 hours  heparin  Injectable 5000 Unit(s) SubCutaneous every 12 hours  insulin glargine Injectable (LANTUS) 25 Unit(s) SubCutaneous every morning  ipratropium    for Nebulization 500 MICROGram(s) Nebulizer every 6 hours  magnesium sulfate  IVPB 1 Gram(s) IV Intermittent daily  pantoprazole    Tablet 40 milliGRAM(s) Oral before breakfast  QUEtiapine 25 milliGRAM(s) Oral at bedtime  sodium chloride 0.9%. 1000 milliLiter(s) (10 mL/Hr) IV Continuous <Continuous>  tamsulosin 0.4 milliGRAM(s) Oral at bedtime  tiotropium 18 MICROgram(s) Capsule 1 Capsule(s) Inhalation daily  zinc sulfate 220 milliGRAM(s) Oral daily    MEDICATIONS  (PRN):  acetaminophen   Tablet .. 650 milliGRAM(s) Oral every 6 hours PRN Mild Pain (1 - 3)  ALPRAZolam 0.125 milliGRAM(s) Oral at bedtime PRN sleep  dextrose 40% Gel 15 Gram(s) Oral once PRN Blood Glucose LESS THAN 70 milliGRAM(s)/deciliter  gabapentin 100 milliGRAM(s) Oral two times a day PRN leg pain  glucagon  Injectable 1 milliGRAM(s) IntraMuscular once PRN Glucose LESS THAN 70 milligrams/deciliter  traMADol 50 milliGRAM(s) Oral every 4 hours PRN Moderate Pain (4 - 6)            Vital Signs Last 24 Hrs  T(C): 37 (16 Oct 2019 16:00), Max: 37 (16 Oct 2019 16:00)  T(F): 98.6 (16 Oct 2019 16:00), Max: 98.6 (16 Oct 2019 16:00)  HR: 87 (16 Oct 2019 19:04) (76 - 108)  BP: 108/53 (16 Oct 2019 19:04) (74/50 - 125/67)  BP(mean): 76 (16 Oct 2019 19:04) (58 - 88)  RR: 21 (16 Oct 2019 19:04) (19 - 27)  SpO2: 99% (16 Oct 2019 19:04) (94% - 100%)     REVIEW OF SYSTEMS:  CONSTITUTIONAL: No fever, weight loss, or fatigue  CARDIOLOGY: PAtient denies chest pain, shortness of breath or syncopal episodes.   RESPIRATORY: denies shortness of breath, wheezeing.   NEUROLOGICAL: NO weakness, no focal deficits to report.  ENDOCRINOLOGICAL: no recent change in diabetic medications.   GI: no BRBPR, no N,V,diarrhea.    PSYCHIATRY: normal mood and affect  HEENT: no nasal discharge, no ecchymosis  SKIN: no ecchymosis, no breakdown  MUSCULOSKELETAL: Full range of motion x4.        PHYSICAL EXAM:  · CONSTITUTIONAL:	Well-developed, well nourished    BMI-  ·RESPIRATORY:   airway patent; breath sounds equal; good air movement; respirations non-labored; clear to auscultation bilaterally; no chest wall tenderness; no intercostal retractions; no rales,rhonchi or wheeze  · CARDIOVASCULAR	regular rate and rhythm  no rub  no murmur  normal PMI  · EXTREMITIES: No cyanosis, clubbing or edema  · VASCULAR: 	Equal and normal pulses (carotid, femoral, dorsalis pedis)  	  TELEMETRY:    ECG:    TTE:    LABS:                        10.0   5.00  )-----------( 251      ( 16 Oct 2019 04:45 )             32.5     10-16    145  |  109  |  36<H>  ----------------------------<  102<H>  4.5   |  24  |  0.7    Ca    9.2      16 Oct 2019 04:45  Mg     2.2     10-15    TPro  5.6<L>  /  Alb  3.5  /  TBili  0.6  /  DBili  x   /  AST  14  /  ALT  11  /  AlkPhos  158<H>  10-16            I&O's Summary    15 Oct 2019 07:01  -  16 Oct 2019 07:00  --------------------------------------------------------  IN: 600 mL / OUT: 1795 mL / NET: -1195 mL    16 Oct 2019 07:01  -  16 Oct 2019 19:49  --------------------------------------------------------  IN: 965 mL / OUT: 750 mL / NET: 215 mL      BNP  RADIOLOGY & ADDITIONAL STUDIES:    IMPRESSION AND PLAN:    Patient on coreg and amiodarone  if BP tolerated will add Entresto

## 2019-10-16 NOTE — CONSULT NOTE ADULT - CONSULT REQUESTED BY NAME
CTU team
Dr Reyes
Michael Sevilla
medicine
primary service
CCU
CCU team
Cardiology team
Dr. Lam
Dr. Mancia
Dr. Escobar

## 2019-10-16 NOTE — PROCEDURE NOTE - NSPROCDETAILS_GEN_ALL_CORE
connection to syringe/connection to evacuated glass bottle/ultrasound assessment of effusion (localization)/catheter inserted over needle/Seldinger technique/ultrasound assessment of effusion (size)/location identified, draped/prepped, sterile technique used, needle inserted/introduced

## 2019-10-16 NOTE — CONSULT NOTE ADULT - ASSESSMENT
IMP:  - 69 yo female admitted 9/21 with NSTEMI, CHF, pulm edema, resp failure, DKA  - hospital course included CABG, + C difficile, episode of melena, perineal and gluteal skin breakdown  - recent h/o hip fracture and later fecal impaction, recent labial abscess drainage  - h/o uncontrolled DM, A fib, viral myopathy  - pt with poor po intake for at least a week, and unclear what she was fed when intubated or what she ate since     - per pt her sense of taste is markedly diminished, she had issues with post-prandial fecal urgency ( r/t C diff and normal after acute GI events and such illnesses), she suffered at least 3 "hits" in the prior 2-3 months before this admission ("my body has taken a beating"), and she has some SOB after eating.  No further recent diarrhea, and pt does not c/o post prandial cramping or urgency at this time.    SUGGEST:  - ZnSO4 220 mg po once daily x 4 weeks then MWF for 4 weeks  - vitamin C 500 mg po once daily x 1 month, then re-evaluate  - suggest MV + minerals such as OneADay womens or generic equivalent, once daily  - physical therapy for muscle strengthening   - attn to adequacy of protein intake - pt given suggestions  - small snacks between meals and at hs daily - pt given suggestions  - Chris po twice daily at home x 1 month

## 2019-10-16 NOTE — CONSULT NOTE ADULT - CONSULT REQUESTED DATE/TIME
01-Oct-2019 13:26
02-Oct-2019 13:56
05-Oct-2019 18:03
08-Oct-2019 23:24
16-Oct-2019 17:24
21-Sep-2019 22:46
22-Sep-2019 09:10
23-Sep-2019 12:16
27-Sep-2019 14:17
28-Sep-2019 10:59
27-Sep-2019 15:13

## 2019-10-16 NOTE — PROCEDURE NOTE - ADDITIONAL PROCEDURE DETAILS
US guidance used for the procedure at all times. Post procedurally US was used to check for lung sliding, which was present in multiple rib spaces.

## 2019-10-16 NOTE — PROGRESS NOTE ADULT - SUBJECTIVE AND OBJECTIVE BOX
OPERATIVE PROCEDURE(s):                POD #                       68yFemale  SURGEON(s): CHINA Escobar  SUBJECTIVE ASSESSMENT:   Vital Signs Last 24 Hrs  T(F): 97.1 (16 Oct 2019 07:00), Max: 97.9 (15 Oct 2019 16:00)  HR: 82 (16 Oct 2019 07:00) (82 - 111)  BP: 112/70 (16 Oct 2019 07:00) (74/50 - 125/67)  BP(mean): 88 (16 Oct 2019 07:00) (58 - 88)  ABP: --  ABP(mean): --  RR: 27 (16 Oct 2019 07:00) (19 - 27)  SpO2: 97% (16 Oct 2019 07:00) (94% - 100%)  CVP(mm Hg): --  CVP(cm H2O): --  CO: --  CI: --  PA: --  SVR: --    I&O's Detail    15 Oct 2019 07:  -  16 Oct 2019 07:00  --------------------------------------------------------  IN:    Oral Fluid: 600 mL  Total IN: 600 mL    OUT:    Indwelling Catheter - Urethral: 1795 mL  Total OUT: 1795 mL        Net:   I&O's Detail    14 Oct 2019 07:01  -  15 Oct 2019 07:00  --------------------------------------------------------  Total NET: -550 mL      15 Oct 2019 07:01  -  16 Oct 2019 07:00  --------------------------------------------------------  Total NET: -1195 mL        CAPILLARY BLOOD GLUCOSE  99 (15 Oct 2019 11:00)      POCT Blood Glucose.: 97 mg/dL (16 Oct 2019 06:49)  POCT Blood Glucose.: 115 mg/dL (15 Oct 2019 22:21)  POCT Blood Glucose.: 99 mg/dL (15 Oct 2019 11:27)    Physical Exam:  General: NAD; A&Ox3/Patient is intubated and sedated  Cardiac: S1/S2, RRR, no murmur, no rubs  Lungs: unlabored respirations, CTA b/l, no wheeze, no rales, no crackles  Abdomen: Soft/NT/ND; positive bowel sounds x 4  Sternum: Intact, no click, incision healing well with no drainage  Incisions: Incisions clean/dry/intact  Extremities: No edema b/l lower extremities; good capillary refill; no cyanosis; palpable 1+ pedal pulses b/l    Central Venous Catheter: Yes[]  No[] , If Yes indication:           Day #  Castellon Catheter: Yes  [] , No  [] , If yes indication:                      Day #  NGT: Yes [] No [] ,    If Yes Placement:                                     Day #  EPICARDIAL WIRES:  [] YES [] NO                                              Day #  BOWEL MOVEMENT:  [] YES [] NO, If No, Timing since last BM:      Day #  CHEST TUBE(Left/Right):  [] YES [] NO, If yes -  AIR LEAKS:  [] YES [] NO        LABS:                        10.0<L>  5.00  )-----------( 251      ( 16 Oct 2019 04:45 )             32.5<L>                        9.4<L>  6.30  )-----------( 219      ( 15 Oct 2019 00:50 )             29.9<L>    10-16    145  |  109  |  36<H>  ----------------------------<  102<H>  4.5   |  24  |  0.7  10-15    144  |  109  |  38<H>  ----------------------------<  107<H>  4.4   |  21  |  0.8    Ca    9.2      16 Oct 2019 04:45  Mg     2.2     10-15    TPro  5.6<L> [6.0 - 8.0]  /  Alb  3.5 [3.5 - 5.2]  /  TBili  0.6 [0.2 - 1.2]  /  DBili  x   /  AST  14 [0 - 41]  /  ALT  11 [0 - 41]  /  AlkPhos  158<H> [30 - 115]  10-16      Urinalysis Basic - ( 13 Oct 2019 13:40 )    Color: Light Yellow / Appearance: Clear / S.009 / pH: x  Gluc: x / Ketone: Negative  / Bili: Negative / Urobili: <2 mg/dL   Blood: x / Protein: Trace / Nitrite: Negative   Leuk Esterase: Small / RBC: 6 /HPF / WBC 6 /HPF   Sq Epi: x / Non Sq Epi: 1 /HPF / Bacteria: Negative        RADIOLOGY & ADDITIONAL TESTS:  CXR:  EKG:  MEDICATIONS  (STANDING):  amiodarone    Tablet 200 milliGRAM(s) Oral daily  aspirin enteric coated 81 milliGRAM(s) Oral daily  atorvastatin 40 milliGRAM(s) Oral at bedtime  carvedilol 3.125 milliGRAM(s) Oral every 12 hours  chlorhexidine 4% Liquid 1 Application(s) Topical <User Schedule>  dextrose 40% Gel 15 Gram(s) Oral once  dextrose 5%. 1000 milliLiter(s) (50 mL/Hr) IV Continuous <Continuous>  dextrose 50% Injectable 50 milliLiter(s) IV Push every 15 minutes  dextrose 50% Injectable 25 milliLiter(s) IV Push every 15 minutes  furosemide    Tablet 40 milliGRAM(s) Oral two times a day  guaiFENesin  milliGRAM(s) Oral every 12 hours  heparin  Injectable 5000 Unit(s) SubCutaneous every 12 hours  insulin glargine Injectable (LANTUS) 25 Unit(s) SubCutaneous every morning  ipratropium    for Nebulization 500 MICROGram(s) Nebulizer every 6 hours  magnesium sulfate  IVPB 1 Gram(s) IV Intermittent daily  pantoprazole    Tablet 40 milliGRAM(s) Oral before breakfast  QUEtiapine 25 milliGRAM(s) Oral at bedtime  sodium chloride 0.9%. 1000 milliLiter(s) (10 mL/Hr) IV Continuous <Continuous>  tamsulosin 0.4 milliGRAM(s) Oral at bedtime  tiotropium 18 MICROgram(s) Capsule 1 Capsule(s) Inhalation daily    MEDICATIONS  (PRN):  acetaminophen   Tablet .. 650 milliGRAM(s) Oral every 6 hours PRN Mild Pain (1 - 3)  ALPRAZolam 0.125 milliGRAM(s) Oral at bedtime PRN sleep  dextrose 40% Gel 15 Gram(s) Oral once PRN Blood Glucose LESS THAN 70 milliGRAM(s)/deciliter  gabapentin 100 milliGRAM(s) Oral two times a day PRN leg pain  glucagon  Injectable 1 milliGRAM(s) IntraMuscular once PRN Glucose LESS THAN 70 milligrams/deciliter  traMADol 50 milliGRAM(s) Oral every 4 hours PRN Moderate Pain (4 - 6)    HEPARIN:  [] YES [] NO  Dose: XX UNITS/HR UNITS Q8H  LOVENOX:[] YES [] NO  Dose: XX mg Q24H  COUMADIN: []  YES [] NO  Dose: XX mg  Q24H  SCD's: YES b/l  GI Prophylaxis: Protonix [], Pepcid [], None [], (Contra-indication:.....)    Post-Op Beta-Blockers: Yes [], No[], If No, then contraindication:  Post-Op Aspirin: Yes [],  No [], If No, then contraindication:  Post-Op Statin: Yes [], No[], If No, then contraindication:  Allergies    No Known Allergies    Intolerances      Ambulation/Activity Status:    Assessment/Plan:  68y Female status-post .....  - Case and plan discussed with CTU Intensivist and CT Surgeon - Dr. Urbano/Flaco/Shawn   - Continue CTU supportive care    - Continue DVT/GI prophylaxis  - Incentive Spirometry 10 times an hour  - Continue to advance physical activity as tolerated and continue PT/OT as directed  1. CAD: Continue ASA, statin, BB  2. HTN:   3. A. Fib:   4. COPD/Hypoxia:   5. DM/Glucose Control:     Social Service Disposition: OPERATIVE PROCEDURE(s):   CABGx3, NUBIA clipped             POD # 9                     68yFemale  SURGEON(s): CHINA Escobar  SUBJECTIVE ASSESSMENT: pt seen and examined. pt feeling a little better today, she had a right pleural effusion s/p thoracentesis which drained 900 cc serosang fluid   Vital Signs Last 24 Hrs  T(F): 97.1 (16 Oct 2019 07:00), Max: 97.9 (15 Oct 2019 16:00)  HR: 82 (16 Oct 2019 07:00) (82 - 111)  BP: 112/70 (16 Oct 2019 07:00) (74/50 - 125/67)  BP(mean): 88 (16 Oct 2019 07:00) (58 - 88)  RR: 27 (16 Oct 2019 07:00) (19 - 27)  SpO2: 97% (16 Oct 2019 07:00) (94% - 100%)    I&O's Detail    15 Oct 2019 07:01  -  16 Oct 2019 07:00  --------------------------------------------------------  IN:    Oral Fluid: 600 mL  Total IN: 600 mL    OUT:    Indwelling Catheter - Urethral: 1795 mL  Total OUT: 1795 mL        Net:   I&O's Detail    14 Oct 2019 07:01  -  15 Oct 2019 07:00  --------------------------------------------------------  Total NET: -550 mL      15 Oct 2019 07:01  -  16 Oct 2019 07:00  --------------------------------------------------------  Total NET: -1195 mL        CAPILLARY BLOOD GLUCOSE  99 (15 Oct 2019 11:00)      POCT Blood Glucose.: 97 mg/dL (16 Oct 2019 06:49)  POCT Blood Glucose.: 115 mg/dL (15 Oct 2019 22:21)  POCT Blood Glucose.: 99 mg/dL (15 Oct 2019 11:27)    Physical Exam:  General: NAD; A&Ox3  Cardiac: S1/S2, RRR, no murmur, no rubs  Lungs: decreased bs at bases bilaterally   Abdomen: Soft/NT/ND; positive bowel sounds x 4  Sternum: Intact, no click, incision healing well with no drainage  Incisions: Incisions clean/dry/intact  Extremities: 1+ edema b/l lower extremities; good capillary refill; no cyanosis; palpable 1+ pedal pulses b/l      Central Venous Catheter: Yes[]  No[x] , If Yes indication:           Day #  Castellon Catheter: Yes  [x] , No  [] , If yes indication:                      Day #  NGT: Yes [] No [x] ,    If Yes Placement:                                     Day #  EPICARDIAL WIRES:  [x] YES [] NO                                              Day # 9  BOWEL MOVEMENT:  [x] YES [] NO, If No, Timing since last BM:      Day #  CHEST TUBE(Left/Right):  [] YES [x] NO, If yes -  AIR LEAKS:  [] YES [] NO        LABS:                        10.0<L>  5.00  )-----------( 251      ( 16 Oct 2019 04:45 )             32.5<L>                        9.4<L>  6.30  )-----------( 219      ( 15 Oct 2019 00:50 )             29.9<L>    10-16    145  |  109  |  36<H>  ----------------------------<  102<H>  4.5   |  24  |  0.7  10-15    144  |  109  |  38<H>  ----------------------------<  107<H>  4.4   |  21  |  0.8    Ca    9.2      16 Oct 2019 04:45  Mg     2.2     10-15    TPro  5.6<L> [6.0 - 8.0]  /  Alb  3.5 [3.5 - 5.2]  /  TBili  0.6 [0.2 - 1.2]  /  DBili  x   /  AST  14 [0 - 41]  /  ALT  11 [0 - 41]  /  AlkPhos  158<H> [30 - 115]  10-16      Urinalysis Basic - ( 13 Oct 2019 13:40 )    Color: Light Yellow / Appearance: Clear / S.009 / pH: x  Gluc: x / Ketone: Negative  / Bili: Negative / Urobili: <2 mg/dL   Blood: x / Protein: Trace / Nitrite: Negative   Leuk Esterase: Small / RBC: 6 /HPF / WBC 6 /HPF   Sq Epi: x / Non Sq Epi: 1 /HPF / Bacteria: Negative        RADIOLOGY & ADDITIONAL TESTS:  CXR: < from: Xray Chest 1 View-PORTABLE IMMEDIATE (10.15.19 @ 06:26) >  Impression:    Stable bilateral pleural effusion/opacities.    < end of copied text >    EKG:   MEDICATIONS  (STANDING):  amiodarone    Tablet 200 milliGRAM(s) Oral daily  aspirin enteric coated 81 milliGRAM(s) Oral daily  atorvastatin 40 milliGRAM(s) Oral at bedtime  carvedilol 3.125 milliGRAM(s) Oral every 12 hours  chlorhexidine 4% Liquid 1 Application(s) Topical <User Schedule>  dextrose 40% Gel 15 Gram(s) Oral once  dextrose 5%. 1000 milliLiter(s) (50 mL/Hr) IV Continuous <Continuous>  dextrose 50% Injectable 50 milliLiter(s) IV Push every 15 minutes  dextrose 50% Injectable 25 milliLiter(s) IV Push every 15 minutes  furosemide    Tablet 40 milliGRAM(s) Oral two times a day  guaiFENesin  milliGRAM(s) Oral every 12 hours  heparin  Injectable 5000 Unit(s) SubCutaneous every 12 hours  insulin glargine Injectable (LANTUS) 25 Unit(s) SubCutaneous every morning  ipratropium    for Nebulization 500 MICROGram(s) Nebulizer every 6 hours  magnesium sulfate  IVPB 1 Gram(s) IV Intermittent daily  pantoprazole    Tablet 40 milliGRAM(s) Oral before breakfast  QUEtiapine 25 milliGRAM(s) Oral at bedtime  sodium chloride 0.9%. 1000 milliLiter(s) (10 mL/Hr) IV Continuous <Continuous>  tamsulosin 0.4 milliGRAM(s) Oral at bedtime  tiotropium 18 MICROgram(s) Capsule 1 Capsule(s) Inhalation daily    MEDICATIONS  (PRN):  acetaminophen   Tablet .. 650 milliGRAM(s) Oral every 6 hours PRN Mild Pain (1 - 3)  ALPRAZolam 0.125 milliGRAM(s) Oral at bedtime PRN sleep  dextrose 40% Gel 15 Gram(s) Oral once PRN Blood Glucose LESS THAN 70 milliGRAM(s)/deciliter  gabapentin 100 milliGRAM(s) Oral two times a day PRN leg pain  glucagon  Injectable 1 milliGRAM(s) IntraMuscular once PRN Glucose LESS THAN 70 milligrams/deciliter  traMADol 50 milliGRAM(s) Oral every 4 hours PRN Moderate Pain (4 - 6)    HEPARIN:  [x] YES [] NO  Dose: 5000 UNITS Q12H  LOVENOX:[] YES [x] NO  Dose: XX mg Q24H  COUMADIN: []  YES [x] NO  Dose: XX mg  Q24H  SCD's: YES b/l  GI Prophylaxis: Protonix [x], Pepcid [], None [], (Contra-indication:.....)    Post-Op Beta-Blockers: Yes [x], No[], If No, then contraindication:  Post-Op Aspirin: Yes [x],  No [], If No, then contraindication:  Post-Op Statin: Yes [x], No[], If No, then contraindication:  Allergies    No Known Allergies    Intolerances      Ambulation/Activity Status: ambulate     Assessment/Plan:  68y Female status-post CABGx 3 with NUBIA clipped- POD#9  - Case and plan discussed with CTU Intensivist and CT Surgeon - Dr. Urbano/Flaco/Shawn   - Continue CTU supportive care    - Continue DVT/GI prophylaxis  - Incentive Spirometry 10 times an hour  - Continue to advance physical activity as tolerated and continue PT/OT as directed  1. CAD: Continue ASA, statin, BB  2. Wound care nurse to eval stage II sacral decubitus  3. Xanax 0.125mh qhs  4. a fib- cont amio q24  5. lasix for fluid overload   6. right pleural effusion- s/p right thoracentesis with 900cc drained     dispo: home OPERATIVE PROCEDURE(s):   CABGx3, NUBIA clipped             POD # 9                     68yFemale  SURGEON(s): CHINA Escobar  SUBJECTIVE ASSESSMENT: pt seen and examined. pt feeling a little better today, she had a right pleural effusion s/p thoracentesis which drained 900 cc serosang fluid   Vital Signs Last 24 Hrs  T(F): 97.1 (16 Oct 2019 07:00), Max: 97.9 (15 Oct 2019 16:00)  HR: 82 (16 Oct 2019 07:00) (82 - 111)  BP: 112/70 (16 Oct 2019 07:00) (74/50 - 125/67)  BP(mean): 88 (16 Oct 2019 07:00) (58 - 88)  RR: 27 (16 Oct 2019 07:00) (19 - 27)  SpO2: 97% (16 Oct 2019 07:00) (94% - 100%)    I&O's Detail    15 Oct 2019 07:01  -  16 Oct 2019 07:00  --------------------------------------------------------  IN:    Oral Fluid: 600 mL  Total IN: 600 mL    OUT:    Indwelling Catheter - Urethral: 1795 mL  Total OUT: 1795 mL    Net:   I&O's Detail    14 Oct 2019 07:01  -  15 Oct 2019 07:00  --------------------------------------------------------  Total NET: -550 mL      15 Oct 2019 07:01  -  16 Oct 2019 07:00  --------------------------------------------------------  Total NET: -1195 mL    CAPILLARY BLOOD GLUCOSE  99 (15 Oct 2019 11:00)    POCT Blood Glucose.: 97 mg/dL (16 Oct 2019 06:49)  POCT Blood Glucose.: 115 mg/dL (15 Oct 2019 22:21)  POCT Blood Glucose.: 99 mg/dL (15 Oct 2019 11:27)    Physical Exam:  General: NAD; A&Ox3  Cardiac: S1/S2, RRR, no murmur, no rubs  Lungs: decreased bs at bases bilaterally   Abdomen: Soft/NT/ND; positive bowel sounds x 4  Sternum: Intact, no click, incision healing well with no drainage  Incisions: Incisions clean/dry/intact  Extremities: 1+ edema b/l lower extremities; good capillary refill; no cyanosis; palpable 1+ pedal pulses b/l    Central Venous Catheter: Yes[]  No[x] , If Yes indication:           Day #  Castellon Catheter: Yes  [x] , No  [] , If yes indication:                      Day #  NGT: Yes [] No [x] ,    If Yes Placement:                                     Day #  EPICARDIAL WIRES:  [x] YES [] NO                                              Day # 9  BOWEL MOVEMENT:  [x] YES [] NO, If No, Timing since last BM:      Day #  CHEST TUBE(Left/Right):  [] YES [x] NO, If yes -  AIR LEAKS:  [] YES [] NO        LABS:                        10.0<L>  5.00  )-----------( 251      ( 16 Oct 2019 04:45 )             32.5<L>                        9.4<L>  6.30  )-----------( 219      ( 15 Oct 2019 00:50 )             29.9<L>    10-16    145  |  109  |  36<H>  ----------------------------<  102<H>  4.5   |  24  |  0.7  10-15    144  |  109  |  38<H>  ----------------------------<  107<H>  4.4   |  21  |  0.8    Ca    9.2      16 Oct 2019 04:45  Mg     2.2     10-15    TPro  5.6<L> [6.0 - 8.0]  /  Alb  3.5 [3.5 - 5.2]  /  TBili  0.6 [0.2 - 1.2]  /  DBili  x   /  AST  14 [0 - 41]  /  ALT  11 [0 - 41]  /  AlkPhos  158<H> [30 - 115]  10-16    Urinalysis Basic - ( 13 Oct 2019 13:40 )    Color: Light Yellow / Appearance: Clear / S.009 / pH: x  Gluc: x / Ketone: Negative  / Bili: Negative / Urobili: <2 mg/dL   Blood: x / Protein: Trace / Nitrite: Negative   Leuk Esterase: Small / RBC: 6 /HPF / WBC 6 /HPF   Sq Epi: x / Non Sq Epi: 1 /HPF / Bacteria: Negative    RADIOLOGY & ADDITIONAL TESTS:  CXR: < from: Xray Chest 1 View-PORTABLE IMMEDIATE (10.15.19 @ 06:26) >  Impression:    Stable bilateral pleural effusion/opacities.    EKG:   MEDICATIONS  (STANDING):  amiodarone    Tablet 200 milliGRAM(s) Oral daily  aspirin enteric coated 81 milliGRAM(s) Oral daily  atorvastatin 40 milliGRAM(s) Oral at bedtime  carvedilol 3.125 milliGRAM(s) Oral every 12 hours  furosemide    Tablet 40 milliGRAM(s) Oral two times a day  guaiFENesin  milliGRAM(s) Oral every 12 hours  heparin  Injectable 5000 Unit(s) SubCutaneous every 12 hours  insulin glargine Injectable (LANTUS) 25 Unit(s) SubCutaneous every morning  ipratropium    for Nebulization 500 MICROGram(s) Nebulizer every 6 hours  magnesium sulfate  IVPB 1 Gram(s) IV Intermittent daily  pantoprazole    Tablet 40 milliGRAM(s) Oral before breakfast  QUEtiapine 25 milliGRAM(s) Oral at bedtime  tamsulosin 0.4 milliGRAM(s) Oral at bedtime  tiotropium 18 MICROgram(s) Capsule 1 Capsule(s) Inhalation daily    MEDICATIONS  (PRN):  acetaminophen   Tablet .. 650 milliGRAM(s) Oral every 6 hours PRN Mild Pain (1 - 3)  ALPRAZolam 0.125 milliGRAM(s) Oral at bedtime PRN sleep  dextrose 40% Gel 15 Gram(s) Oral once PRN Blood Glucose LESS THAN 70 milliGRAM(s)/deciliter  gabapentin 100 milliGRAM(s) Oral two times a day PRN leg pain  glucagon  Injectable 1 milliGRAM(s) IntraMuscular once PRN Glucose LESS THAN 70 milligrams/deciliter  traMADol 50 milliGRAM(s) Oral every 4 hours PRN Moderate Pain (4 - 6)    Allergies  No Known Allergies  Assessment/Plan:  68y Female status-post CABGx 3 with NUBIA clipped- POD#9  - Case and plan discussed with CTU Intensivist and CT Surgeon - Dr. Urbano/Flaco/Shawn   - Continue CTU supportive care    - Continue DVT/GI prophylaxis  - Incentive Spirometry 10 times an hour  - Continue to advance physical activity as tolerated and continue PT/OT as directed  1. CAD: Continue ASA, statin, BB  2. Wound care nurse to eval stage II sacral decubitus  3. Xanax 0.125mh qhs  4. a fib- cont amio q24  5. lasix for fluid overload   6. right pleural effusion- s/p right thoracentesis with 900cc drained     dispo: home

## 2019-10-16 NOTE — ADVANCED PRACTICE NURSE CONSULT - ASSESSMENT
Patient with Multiple risk factors , creating a challenge to maintain skin integrity .  History of surgical repair  \Cardiac failure and hip repair   History of clostridium difficile resulting in a maceration    Prolapse uterus   Skin to gluteal/perineum  area eroded from constant incontinence along with debilitated state.  erosion has a pattern related to incontinence   following incontinent areas along perineum and gluteal folds.

## 2019-10-16 NOTE — ADVANCED PRACTICE NURSE CONSULT - RECOMMEDATIONS
Turn and position Q2H  Off  load heels  Seat cushion when sitting in the chair  Repositon to off load sacrum /gluteal area while in the chair  Triad barrier to open areas  Alleveyn to fragile areas to protect form shearing when OOB to chair.  Family and patient education  Daughter receptive and appropriate for care at home.

## 2019-10-16 NOTE — PROGRESS NOTE ADULT - SUBJECTIVE AND OBJECTIVE BOX
69 yo F w uncontrolled DM (non-compliant), viral myopathy, Paroxysmal Afib, recent labial abscess s/p ID, hip fracture s/p fixation, recent LGIB bleed now presents for shortness of breath. Patient has been having shortness of breath for the last day. Occurs on exertion and stationary however no worsening of breathing when laying down. Patient denied abdominal pain however family reports she has been having some abdominal discomfort over the last day. Patient also has an indwelling Montejo for the last 3 weeks for urinary retention. Reports cough with no sputum production. Denies chest pain, fever, nausea/vomiting, diarrhea. CC- Ischemic Cardiomyopathy, NSTEMI, Severe CAD with LM/TVD and strong fam history of CAD.    POD # 9 CABG x3, EF 20-25% and left atrial appendage clipping  Montejo was changed by GYN 10/13. UA negative. Urine c/s NGTD.  Improved Mental status post d/c baclofen and xanax. Seroquel was started   Rate controlled A fib    Vital Signs Last 24 Hrs  T(C): 35.9 (16 Oct 2019 04:00), Max: 36.6 (15 Oct 2019 16:00)  T(F): 96.6 (16 Oct 2019 04:00), Max: 97.9 (15 Oct 2019 16:00)  HR: 93 (16 Oct 2019 06:00) (87 - 111)  BP: 121/67 (16 Oct 2019 06:00) (74/50 - 125/67)  BP(mean): 88 (16 Oct 2019 06:00) (58 - 88)  RR: 24 (16 Oct 2019 06:00) (19 - 26)  SpO2: 98% (16 Oct 2019 06:00) (94% - 100%)    alert, awake, verbal - easily falls asleep  follows commands appropriately  no JVD  Clean sternal incision  S1S2 irreg rate and rhythm  b/l air entry, no wheeze, no sounds at bases b/l  soft abdomen, non tender, non distended  warm periphery with + distal pulses, b/l pitting edema    Bedside US: b/l pleural effusions. right > left.  CXR bibasilar congestion    WBC 5, Hg 10, plt 251  BUN 36, Cr 0.7    a/p:    CAD s/p CABG  Rate controlled A fib  b/l pleural effusions  Encephalopathy likely due to meds (xanax, baclofen)  Acute on Chr. Hypoxic Resp failure  Uterine prolopase    Thoracentesis today  PO PPI, PO ASA 81 mg, Hep SC q8  Statin 40 mg QD, Change to coreg 3.125 bid and add Amio as per CT surgery request.  Lasix 40 bid PO  Lantus 25  Xanax qhs at 0.125 mg   keep montejo, cont with tamsulosin.   A fib is rate controlled with clipped left atrial appendage. No a/c at this time  Wean off NC. Currently on 1-2 liters.  OOB to chair  glycemic control, DVT proph

## 2019-10-16 NOTE — PROGRESS NOTE ADULT - ATTENDING COMMENTS
POD 9 after CABG/NUBIA clip c/b GI bleeding/diarrhea    Neuro: much improved, occasionally has some trouble sleeping  on seroquel qhs    CV:   cont atorvastatin  cont coreg (restarted today)  cont asa  TTE EF 22%, mod to severe MR, mild to moderate AS  in and out of AF, rate controlled  heart failure consultation is appreciated    GI:  cont PO PPI bid    Resp:   off O2 today  R chest is drained (900cc, serous)  may drain her L chest tomorrow    Renal:  bun/creat ratio stable  diuresis (negative 1.1L/24hrs)    Heme:  cont asa/sqh  pt is not a good candidate for anticoagulation b/c GI bleeding and appendage is clipped    GYN/:  there is a concern for excoriation of prolapsed uterus and bladder  will reach out to Dr. Rodriguez's team to assist with management    ID:  off abx    Probable d/c on Thursday or friday  case d/w CTU team

## 2019-10-16 NOTE — CONSULT NOTE ADULT - SUBJECTIVE AND OBJECTIVE BOX
68F with PMHx of uncontrolled DM (non-compliant), viral myopathy, Paroxysmal Afib, uterine prolapse, recent labial abscess s/p ID, hip fracture on 8/3/19 s/p fixation, readmission shortly thereafter with fecal impaction (per pt, r/t pain meds), recent LGIB bleed now presents for shortness of breath. Patient has been having shortness of breath for the last day. Occurs on exertion and stationary however no worsening of breathing when laying down. Patient denied abdominal pain however family reports she has been having some abdominal discomfort over the last day. Patient also has an indwelling montejo for the last 3 weeks for urinary retention.  Hospital course:  + NSTEMI  + required intubation for several days  + decompensated CHF with pleural effusions, cardiogenic shock  + DKA  9/25 + C Diff  10/7 underwent CABG  10/8 episode of melena, pt cont c/o diarrhea - see GI notes - pt NPO 10/6-10  + reported poor po intake since then  + gluteal and perineal maceration, seen by wound care nurse specialist  10/16 thoracentesis    Nutrition Support consultation first requested today, hospital day 26.    Vital Signs Last 24 Hrs  T(C): 37 (16 Oct 2019 16:00), Max: 37 (16 Oct 2019 16:00)  T(F): 98.6 (16 Oct 2019 16:00), Max: 98.6 (16 Oct 2019 16:00)  HR: 84 (16 Oct 2019 16:00) (76 - 108)  BP: 101/55 (16 Oct 2019 16:00) (74/50 - 125/67)  BP(mean): 73 (16 Oct 2019 16:00) (58 - 88)  RR: 23 (16 Oct 2019 16:00) (19 - 27)  SpO2: 98% (16 Oct 2019 16:00) (94% - 100%)  Drug Dosing Weight  Height (cm): 160 (21 Sep 2019 23:30)  Weight (kg): 57.2 (16 Oct 2019 12:12)  --  60.2 kg on admission 9/21, 53.0 kg just prior to CABG, 57.2 today with edema and before thoracentesis  BMI (kg/m2): 22.3 (16 Oct 2019 12:12)  BSA (m2): 1.59 (16 Oct 2019 12:12)  A&O  + mild SOB and splinting, c/o pain after thoracentesis  skin turgor ok, anicteric, conj pale  + sternal wound, clean, dry  + mult ecchymoses on arms and legs  + LLE wound w stitches from vein graft site  + LE edema  + loss of quads and calves, pt reports some "wobbling" when standing to transfer    MEDICATIONS  (STANDING):  amiodarone    Tablet 200 milliGRAM(s) Oral daily  aspirin enteric coated 81 milliGRAM(s) Oral daily  atorvastatin 40 milliGRAM(s) Oral at bedtime  carvedilol 3.125 milliGRAM(s) Oral every 12 hours  chlorhexidine 4% Liquid 1 Application(s) Topical <User Schedule>  furosemide    Tablet 40 milliGRAM(s) Oral two times a day  guaiFENesin  milliGRAM(s) Oral every 12 hours  heparin  Injectable 5000 Unit(s) SubCutaneous every 12 hours  insulin glargine Injectable (LANTUS) 25 Unit(s) SubCutaneous every morning  ipratropium    for Nebulization 500 MICROGram(s) Nebulizer every 6 hours  magnesium sulfate  IVPB 1 Gram(s) IV Intermittent daily  pantoprazole    Tablet 40 milliGRAM(s) Oral before breakfast  QUEtiapine 25 milliGRAM(s) Oral at bedtime  sodium chloride 0.9%. 1000 milliLiter(s) (10 mL/Hr) IV Continuous <Continuous>  tamsulosin 0.4 milliGRAM(s) Oral at bedtime  tiotropium 18 MICROgram(s) Capsule 1 Capsule(s) Inhalation daily                        10.0   5.00  )-----------( 251      ( 16 Oct 2019 04:45 )             32.5   10-16    145  |  109  |  36<H>  ----------------------------<  102<H>  4.5   |  24  |  0.7    Ca    9.2      16 Oct 2019 04:45  Mg     2.2     10-15  TPro  5.6<L>  /  Alb  3.5  /  TBili  0.6  /  DBili  x   /  AST  14  /  ALT  11  /  AlkPhos  158<H>  10-16  Phosphorus Level, Serum (10.01.19 @ 05:15)    Phosphorus Level, Serum: 2.4 mg/dL  -  and no repeats since 10/1    Hemoglobin A1C with Mean Plasma Glucose (08.30.19 @ 07:16)  not done this admission    Hemoglobin A1C, Whole Blood: 8.5: Method: Immunoassay    Mean Plasma Glucose: 197 mg/dL

## 2019-10-16 NOTE — CONSULT NOTE ADULT - CONSULT REASON
CD colitis
SHIRLEY DEGROOT
dm
gait dysfunction
poor po intake, multiple medical issues
CAD w/reduced EF
NSTEMI
SOB, elevated troponin
ICM, acute HF
montejo replacement
AFib

## 2019-10-16 NOTE — PROGRESS NOTE ADULT - SUBJECTIVE AND OBJECTIVE BOX
Interval history: Patient found sitting in chair today. She reports having SOB when walking around. No chest pain.       HPI       69 y/o F with h/o DM, recent hip replacement admitted with c/o sob that started acutely 6 days ago. Per family, patient was lethargic, sob and complaint of nausea. She also felt chest discomfort that radiated to the back. Upon admission, an echocardiogram showed LVEF ~ 15% from ~ 45% one month earlier. Her troponin were elevated and pro-BNP was 20 k. A lHC showed triple vessel disease with 80% disease of LM.      Now she is s/p CABG and NUBIA clipping.       PMH: DM, Hip fracture     Social: Denies ETOH, smoking     FH: father  from heart disease in his 50s, mother  in her 30s from cancer

## 2019-10-17 LAB
ANION GAP SERPL CALC-SCNC: 14 MMOL/L — SIGNIFICANT CHANGE UP (ref 7–14)
BUN SERPL-MCNC: 39 MG/DL — HIGH (ref 10–20)
CALCIUM SERPL-MCNC: 9.2 MG/DL — SIGNIFICANT CHANGE UP (ref 8.5–10.1)
CHLORIDE SERPL-SCNC: 108 MMOL/L — SIGNIFICANT CHANGE UP (ref 98–110)
CO2 SERPL-SCNC: 24 MMOL/L — SIGNIFICANT CHANGE UP (ref 17–32)
CREAT SERPL-MCNC: 0.9 MG/DL — SIGNIFICANT CHANGE UP (ref 0.7–1.5)
GLUCOSE BLDC GLUCOMTR-MCNC: 107 MG/DL — HIGH (ref 70–99)
GLUCOSE BLDC GLUCOMTR-MCNC: 155 MG/DL — HIGH (ref 70–99)
GLUCOSE BLDC GLUCOMTR-MCNC: 162 MG/DL — HIGH (ref 70–99)
GLUCOSE BLDC GLUCOMTR-MCNC: 83 MG/DL — SIGNIFICANT CHANGE UP (ref 70–99)
GLUCOSE SERPL-MCNC: 90 MG/DL — SIGNIFICANT CHANGE UP (ref 70–99)
HCT VFR BLD CALC: 32.2 % — LOW (ref 37–47)
HGB BLD-MCNC: 10 G/DL — LOW (ref 12–16)
MAGNESIUM SERPL-MCNC: 2.2 MG/DL — SIGNIFICANT CHANGE UP (ref 1.8–2.4)
MCHC RBC-ENTMCNC: 27.6 PG — SIGNIFICANT CHANGE UP (ref 27–31)
MCHC RBC-ENTMCNC: 31.1 G/DL — LOW (ref 32–37)
MCV RBC AUTO: 89 FL — SIGNIFICANT CHANGE UP (ref 81–99)
NRBC # BLD: 0 /100 WBCS — SIGNIFICANT CHANGE UP (ref 0–0)
PLATELET # BLD AUTO: 268 K/UL — SIGNIFICANT CHANGE UP (ref 130–400)
POTASSIUM SERPL-MCNC: 4.2 MMOL/L — SIGNIFICANT CHANGE UP (ref 3.5–5)
POTASSIUM SERPL-SCNC: 4.2 MMOL/L — SIGNIFICANT CHANGE UP (ref 3.5–5)
RBC # BLD: 3.62 M/UL — LOW (ref 4.2–5.4)
RBC # FLD: 16.8 % — HIGH (ref 11.5–14.5)
SODIUM SERPL-SCNC: 146 MMOL/L — SIGNIFICANT CHANGE UP (ref 135–146)
WBC # BLD: 4.35 K/UL — LOW (ref 4.8–10.8)
WBC # FLD AUTO: 4.35 K/UL — LOW (ref 4.8–10.8)

## 2019-10-17 PROCEDURE — 71045 X-RAY EXAM CHEST 1 VIEW: CPT | Mod: 26

## 2019-10-17 PROCEDURE — 93010 ELECTROCARDIOGRAM REPORT: CPT

## 2019-10-17 RX ORDER — INSULIN GLARGINE 100 [IU]/ML
10 INJECTION, SOLUTION SUBCUTANEOUS EVERY MORNING
Refills: 0 | Status: DISCONTINUED | OUTPATIENT
Start: 2019-10-17 | End: 2019-10-17

## 2019-10-17 RX ORDER — METFORMIN HYDROCHLORIDE 850 MG/1
1000 TABLET ORAL
Refills: 0 | Status: DISCONTINUED | OUTPATIENT
Start: 2019-10-17 | End: 2019-10-18

## 2019-10-17 RX ORDER — ALPRAZOLAM 0.25 MG
0.25 TABLET ORAL
Refills: 0 | Status: DISCONTINUED | OUTPATIENT
Start: 2019-10-17 | End: 2019-10-18

## 2019-10-17 RX ORDER — INSULIN LISPRO 100/ML
VIAL (ML) SUBCUTANEOUS
Refills: 0 | Status: DISCONTINUED | OUTPATIENT
Start: 2019-10-17 | End: 2019-10-18

## 2019-10-17 RX ADMIN — CARVEDILOL PHOSPHATE 3.12 MILLIGRAM(S): 80 CAPSULE, EXTENDED RELEASE ORAL at 05:35

## 2019-10-17 RX ADMIN — CARVEDILOL PHOSPHATE 3.12 MILLIGRAM(S): 80 CAPSULE, EXTENDED RELEASE ORAL at 17:28

## 2019-10-17 RX ADMIN — HEPARIN SODIUM 5000 UNIT(S): 5000 INJECTION INTRAVENOUS; SUBCUTANEOUS at 17:28

## 2019-10-17 RX ADMIN — Medication 600 MILLIGRAM(S): at 17:28

## 2019-10-17 RX ADMIN — CHLORHEXIDINE GLUCONATE 1 APPLICATION(S): 213 SOLUTION TOPICAL at 08:07

## 2019-10-17 RX ADMIN — TAMSULOSIN HYDROCHLORIDE 0.4 MILLIGRAM(S): 0.4 CAPSULE ORAL at 22:13

## 2019-10-17 RX ADMIN — Medication 0.25 MILLIGRAM(S): at 12:10

## 2019-10-17 RX ADMIN — ATORVASTATIN CALCIUM 40 MILLIGRAM(S): 80 TABLET, FILM COATED ORAL at 22:13

## 2019-10-17 RX ADMIN — METFORMIN HYDROCHLORIDE 1000 MILLIGRAM(S): 850 TABLET ORAL at 12:10

## 2019-10-17 RX ADMIN — QUETIAPINE FUMARATE 25 MILLIGRAM(S): 200 TABLET, FILM COATED ORAL at 22:13

## 2019-10-17 RX ADMIN — Medication 100 GRAM(S): at 12:11

## 2019-10-17 RX ADMIN — AMIODARONE HYDROCHLORIDE 200 MILLIGRAM(S): 400 TABLET ORAL at 05:36

## 2019-10-17 RX ADMIN — PANTOPRAZOLE SODIUM 40 MILLIGRAM(S): 20 TABLET, DELAYED RELEASE ORAL at 08:06

## 2019-10-17 RX ADMIN — Medication 81 MILLIGRAM(S): at 12:11

## 2019-10-17 RX ADMIN — ZINC SULFATE TAB 220 MG (50 MG ZINC EQUIVALENT) 220 MILLIGRAM(S): 220 (50 ZN) TAB at 14:04

## 2019-10-17 RX ADMIN — Medication 600 MILLIGRAM(S): at 05:36

## 2019-10-17 RX ADMIN — Medication 40 MILLIGRAM(S): at 05:36

## 2019-10-17 RX ADMIN — Medication 40 MILLIGRAM(S): at 17:28

## 2019-10-17 NOTE — PROGRESS NOTE ADULT - SUBJECTIVE AND OBJECTIVE BOX
SUBJECTIVE ASSESSMENT:  pt has no major complaints    Vital Signs Last 24 Hrs  T(C): 36.5 (17 Oct 2019 04:33), Max: 37.2 (16 Oct 2019 20:55)  T(F): 97.7 (17 Oct 2019 04:33), Max: 99 (16 Oct 2019 20:55)  HR: 120 (17 Oct 2019 04:33) (84 - 120)  BP: 113/57 (17 Oct 2019 04:33) (82/52 - 117/60)  BP(mean): 81 (16 Oct 2019 20:37) (63 - 81)  RR: 20 (17 Oct 2019 04:33) (19 - 27)  SpO2: 98% (16 Oct 2019 20:37) (96% - 99%)  10-16-19 @ 07:01  -  10-17-19 @ 07:00  --------------------------------------------------------  IN: 965 mL / OUT: 1225 mL / NET: -260 mL    A&OX3 in NAD  decreased bs at the bases (L>R)  sternum stable, no drainage  nl s1, s2  abd soft/NT/ND  no peripheral edema  SVG harvest site is healing well    LABS:                        10.0   4.35  )-----------( 268      ( 17 Oct 2019 06:28 )             32.2     COUMADIN:   [ ] YES [ ] NO      10-17    146  |  108  |  39<H>  ----------------------------<  90  4.2   |  24  |  0.9    Ca    9.2      17 Oct 2019 06:28  Mg     2.2     10-17    TPro  5.6<L>  /  Alb  3.5  /  TBili  0.6  /  DBili  x   /  AST  14  /  ALT  11  /  AlkPhos  158<H>  10-16    MEDICATIONS  (STANDING):  amiodarone    Tablet 200 milliGRAM(s) Oral daily  aspirin enteric coated 81 milliGRAM(s) Oral daily  atorvastatin 40 milliGRAM(s) Oral at bedtime  carvedilol 3.125 milliGRAM(s) Oral every 12 hours  furosemide    Tablet 40 milliGRAM(s) Oral two times a day  guaiFENesin  milliGRAM(s) Oral every 12 hours  heparin  Injectable 5000 Unit(s) SubCutaneous every 12 hours  insulin glargine Injectable (LANTUS) 25 Unit(s) SubCutaneous every morning  ipratropium    for Nebulization 500 MICROGram(s) Nebulizer every 6 hours  magnesium sulfate  IVPB 1 Gram(s) IV Intermittent daily  pantoprazole    Tablet 40 milliGRAM(s) Oral before breakfast  QUEtiapine 25 milliGRAM(s) Oral at bedtime  tamsulosin 0.4 milliGRAM(s) Oral at bedtime  tiotropium 18 MICROgram(s) Capsule 1 Capsule(s) Inhalation daily  zinc sulfate 220 milliGRAM(s) Oral daily    MEDICATIONS  (PRN):  acetaminophen   Tablet .. 650 milliGRAM(s) Oral every 6 hours PRN Mild Pain (1 - 3)  ALPRAZolam 0.125 milliGRAM(s) Oral at bedtime PRN sleep  dextrose 40% Gel 15 Gram(s) Oral once PRN Blood Glucose LESS THAN 70 milliGRAM(s)/deciliter  gabapentin 100 milliGRAM(s) Oral two times a day PRN leg pain  glucagon  Injectable 1 milliGRAM(s) IntraMuscular once PRN Glucose LESS THAN 70 milligrams/deciliter

## 2019-10-17 NOTE — PROGRESS NOTE ADULT - ATTENDING COMMENTS
POD 10 after CABG/NUBIA clip c/b GI bleeding/diarrhea    Neuro: much improved, occasionally has some trouble sleeping  on seroquel qhs    CV:   cont atorvastatin  cont coreg  cont asa  TTE EF 22%, mod to severe MR, mild to moderate AS  in and out of AF, rate controlled  heart failure consultation is appreciated  Life vest is being set up    GI:  cont PO PPI bid    Resp:   off O2   R chest is drained (900cc, serous) 10/16/2019    Renal:  bun/creat ratio stable  cont diuresis    Heme:  cont asa/sqh  pt is not a good candidate for anticoagulation b/c GI bleeding and appendage is clipped    GYN/:  there is a concern for excoriation of prolapsed uterus and bladder  will reach out to Dr. Rodriguez's team to assist with management    ID:  off abx    Probable d/c on friday  pt doesn't want to go to rehab  case d/w CTU team

## 2019-10-17 NOTE — CHART NOTE - NSCHARTNOTEFT_GEN_A_CORE
The patient's fingersticks have been reviewed and are low ranging from  with Lantus being held.  Being the patient has normal renal funciton, will change therapy to Glucophage 1000mg PO Q12H and pt will f/u with Endocrine as an outpt.

## 2019-10-17 NOTE — PROGRESS NOTE ADULT - SUBJECTIVE AND OBJECTIVE BOX
SUBJ: no new complains      MEDICATIONS  (STANDING):  amiodarone    Tablet 200 milliGRAM(s) Oral daily  aspirin enteric coated 81 milliGRAM(s) Oral daily  atorvastatin 40 milliGRAM(s) Oral at bedtime  carvedilol 3.125 milliGRAM(s) Oral every 12 hours  chlorhexidine 4% Liquid 1 Application(s) Topical <User Schedule>  dextrose 40% Gel 15 Gram(s) Oral once  dextrose 5%. 1000 milliLiter(s) (50 mL/Hr) IV Continuous <Continuous>  dextrose 50% Injectable 50 milliLiter(s) IV Push every 15 minutes  dextrose 50% Injectable 25 milliLiter(s) IV Push every 15 minutes  furosemide    Tablet 40 milliGRAM(s) Oral two times a day  guaiFENesin  milliGRAM(s) Oral every 12 hours  heparin  Injectable 5000 Unit(s) SubCutaneous every 12 hours  insulin lispro (HumaLOG) corrective regimen sliding scale   SubCutaneous three times a day before meals  magnesium sulfate  IVPB 1 Gram(s) IV Intermittent daily  metFORMIN 1000 milliGRAM(s) Oral two times a day  pantoprazole    Tablet 40 milliGRAM(s) Oral before breakfast  QUEtiapine 25 milliGRAM(s) Oral at bedtime  sodium chloride 0.9%. 1000 milliLiter(s) (10 mL/Hr) IV Continuous <Continuous>  tamsulosin 0.4 milliGRAM(s) Oral at bedtime  tiotropium 18 MICROgram(s) Capsule 1 Capsule(s) Inhalation daily  zinc sulfate 220 milliGRAM(s) Oral daily    MEDICATIONS  (PRN):  acetaminophen   Tablet .. 650 milliGRAM(s) Oral every 6 hours PRN Mild Pain (1 - 3)  ALPRAZolam 0.25 milliGRAM(s) Oral two times a day PRN anxiety  dextrose 40% Gel 15 Gram(s) Oral once PRN Blood Glucose LESS THAN 70 milliGRAM(s)/deciliter  gabapentin 100 milliGRAM(s) Oral two times a day PRN leg pain  glucagon  Injectable 1 milliGRAM(s) IntraMuscular once PRN Glucose LESS THAN 70 milligrams/deciliter            Vital Signs Last 24 Hrs  T(C): 36.7 (17 Oct 2019 18:05), Max: 37.2 (16 Oct 2019 20:55)  T(F): 98 (17 Oct 2019 18:05), Max: 99 (16 Oct 2019 20:55)  HR: 88 (17 Oct 2019 18:05) (87 - 120)  BP: 103/55 (17 Oct 2019 18:05) (88/54 - 117/60)  BP(mean): 81 (16 Oct 2019 20:37) (81 - 81)  RR: 18 (17 Oct 2019 18:05) (18 - 22)  SpO2: 98% (16 Oct 2019 20:37) (98% - 98%)     REVIEW OF SYSTEMS:  CONSTITUTIONAL: No fever, weight loss, or fatigue  CARDIOLOGY: PAtient denies chest pain, shortness of breath or syncopal episodes.   RESPIRATORY: denies shortness of breath, wheezeing.   NEUROLOGICAL: NO weakness, no focal deficits to report.  ENDOCRINOLOGICAL: no recent change in diabetic medications.   GI: no BRBPR, no N,V,diarrhea.    PSYCHIATRY: normal mood and affect  HEENT: no nasal discharge, no ecchymosis  SKIN: no ecchymosis, no breakdown  MUSCULOSKELETAL: Full range of motion x4.        PHYSICAL EXAM:  · CONSTITUTIONAL:	Well-developed, well nourished    BMI-  ·RESPIRATORY:   airway patent; breath sounds equal; good air movement; respirations non-labored; clear to auscultation bilaterally; no chest wall tenderness; no intercostal retractions; no rales,rhonchi or wheeze  · CARDIOVASCULAR	regular rate and rhythm  no rub  no murmur  normal PMI  · EXTREMITIES: No cyanosis, clubbing or edema  · VASCULAR: 	Equal and normal pulses (carotid, femoral, dorsalis pedis)  	  TELEMETRY:    ECG:    TTE:    LABS:                        10.0   4.35  )-----------( 268      ( 17 Oct 2019 06:28 )             32.2     10-17    146  |  108  |  39<H>  ----------------------------<  90  4.2   |  24  |  0.9    Ca    9.2      17 Oct 2019 06:28  Mg     2.2     10-17    TPro  5.6<L>  /  Alb  3.5  /  TBili  0.6  /  DBili  x   /  AST  14  /  ALT  11  /  AlkPhos  158<H>  10-16            I&O's Summary    16 Oct 2019 07:01  -  17 Oct 2019 07:00  --------------------------------------------------------  IN: 965 mL / OUT: 1225 mL / NET: -260 mL    17 Oct 2019 07:01  -  17 Oct 2019 19:46  --------------------------------------------------------  IN: 700 mL / OUT: 600 mL / NET: 100 mL      BNP  RADIOLOGY & ADDITIONAL STUDIES:    IMPRESSION AND PLAN:    Continue with current management

## 2019-10-18 VITALS — WEIGHT: 143.3 LBS | HEIGHT: 63 IN

## 2019-10-18 LAB
ANION GAP SERPL CALC-SCNC: 13 MMOL/L — SIGNIFICANT CHANGE UP (ref 7–14)
BUN SERPL-MCNC: 42 MG/DL — HIGH (ref 10–20)
CALCIUM SERPL-MCNC: 9.2 MG/DL — SIGNIFICANT CHANGE UP (ref 8.5–10.1)
CHLORIDE SERPL-SCNC: 103 MMOL/L — SIGNIFICANT CHANGE UP (ref 98–110)
CO2 SERPL-SCNC: 23 MMOL/L — SIGNIFICANT CHANGE UP (ref 17–32)
CREAT SERPL-MCNC: 1 MG/DL — SIGNIFICANT CHANGE UP (ref 0.7–1.5)
GLUCOSE BLDC GLUCOMTR-MCNC: 122 MG/DL — HIGH (ref 70–99)
GLUCOSE BLDC GLUCOMTR-MCNC: 123 MG/DL — HIGH (ref 70–99)
GLUCOSE SERPL-MCNC: 144 MG/DL — HIGH (ref 70–99)
HCT VFR BLD CALC: 31.5 % — LOW (ref 37–47)
HGB BLD-MCNC: 9.5 G/DL — LOW (ref 12–16)
MCHC RBC-ENTMCNC: 26.8 PG — LOW (ref 27–31)
MCHC RBC-ENTMCNC: 30.2 G/DL — LOW (ref 32–37)
MCV RBC AUTO: 89 FL — SIGNIFICANT CHANGE UP (ref 81–99)
NRBC # BLD: 0 /100 WBCS — SIGNIFICANT CHANGE UP (ref 0–0)
PLATELET # BLD AUTO: 272 K/UL — SIGNIFICANT CHANGE UP (ref 130–400)
POTASSIUM SERPL-MCNC: 4.4 MMOL/L — SIGNIFICANT CHANGE UP (ref 3.5–5)
POTASSIUM SERPL-SCNC: 4.4 MMOL/L — SIGNIFICANT CHANGE UP (ref 3.5–5)
RBC # BLD: 3.54 M/UL — LOW (ref 4.2–5.4)
RBC # FLD: 16.9 % — HIGH (ref 11.5–14.5)
SODIUM SERPL-SCNC: 139 MMOL/L — SIGNIFICANT CHANGE UP (ref 135–146)
WBC # BLD: 5.5 K/UL — SIGNIFICANT CHANGE UP (ref 4.8–10.8)
WBC # FLD AUTO: 5.5 K/UL — SIGNIFICANT CHANGE UP (ref 4.8–10.8)

## 2019-10-18 PROCEDURE — 99232 SBSQ HOSP IP/OBS MODERATE 35: CPT

## 2019-10-18 PROCEDURE — 71045 X-RAY EXAM CHEST 1 VIEW: CPT | Mod: 26

## 2019-10-18 PROCEDURE — 93010 ELECTROCARDIOGRAM REPORT: CPT

## 2019-10-18 RX ORDER — ATORVASTATIN CALCIUM 80 MG/1
1 TABLET, FILM COATED ORAL
Qty: 30 | Refills: 0
Start: 2019-10-18

## 2019-10-18 RX ORDER — FUROSEMIDE 40 MG
40 TABLET ORAL DAILY
Refills: 0 | Status: DISCONTINUED | OUTPATIENT
Start: 2019-10-18 | End: 2019-10-18

## 2019-10-18 RX ORDER — BUMETANIDE 0.25 MG/ML
1 INJECTION INTRAMUSCULAR; INTRAVENOUS EVERY 12 HOURS
Refills: 0 | Status: DISCONTINUED | OUTPATIENT
Start: 2019-10-18 | End: 2019-10-18

## 2019-10-18 RX ORDER — TIOTROPIUM BROMIDE 18 UG/1
1 CAPSULE ORAL; RESPIRATORY (INHALATION)
Qty: 30 | Refills: 0
Start: 2019-10-18 | End: 2019-11-16

## 2019-10-18 RX ORDER — QUETIAPINE FUMARATE 200 MG/1
1 TABLET, FILM COATED ORAL
Qty: 30 | Refills: 0
Start: 2019-10-18

## 2019-10-18 RX ORDER — METFORMIN HYDROCHLORIDE 850 MG/1
1 TABLET ORAL
Qty: 60 | Refills: 0
Start: 2019-10-18 | End: 2019-11-16

## 2019-10-18 RX ORDER — CARVEDILOL PHOSPHATE 80 MG/1
1 CAPSULE, EXTENDED RELEASE ORAL
Qty: 60 | Refills: 0
Start: 2019-10-18

## 2019-10-18 RX ORDER — LOSARTAN POTASSIUM 100 MG/1
12.5 TABLET, FILM COATED ORAL DAILY
Refills: 0 | Status: DISCONTINUED | OUTPATIENT
Start: 2019-10-18 | End: 2019-10-18

## 2019-10-18 RX ORDER — ZINC SULFATE TAB 220 MG (50 MG ZINC EQUIVALENT) 220 (50 ZN) MG
1 TAB ORAL
Qty: 30 | Refills: 0
Start: 2019-10-18

## 2019-10-18 RX ORDER — ASPIRIN/CALCIUM CARB/MAGNESIUM 324 MG
1 TABLET ORAL
Qty: 30 | Refills: 0
Start: 2019-10-18 | End: 2019-11-16

## 2019-10-18 RX ORDER — LOSARTAN POTASSIUM 100 MG/1
0.5 TABLET, FILM COATED ORAL
Qty: 15 | Refills: 0
Start: 2019-10-18 | End: 2019-11-16

## 2019-10-18 RX ORDER — TAMSULOSIN HYDROCHLORIDE 0.4 MG/1
1 CAPSULE ORAL
Qty: 30 | Refills: 0
Start: 2019-10-18

## 2019-10-18 RX ORDER — PANTOPRAZOLE SODIUM 20 MG/1
1 TABLET, DELAYED RELEASE ORAL
Qty: 30 | Refills: 0
Start: 2019-10-18

## 2019-10-18 RX ORDER — AMIODARONE HYDROCHLORIDE 400 MG/1
2 TABLET ORAL
Qty: 60 | Refills: 0
Start: 2019-10-18

## 2019-10-18 RX ORDER — ALPRAZOLAM 0.25 MG
1 TABLET ORAL
Qty: 20 | Refills: 0
Start: 2019-10-18

## 2019-10-18 RX ORDER — AMIODARONE HYDROCHLORIDE 400 MG/1
400 TABLET ORAL DAILY
Refills: 0 | Status: DISCONTINUED | OUTPATIENT
Start: 2019-10-18 | End: 2019-10-18

## 2019-10-18 RX ORDER — ACETAMINOPHEN 500 MG
2 TABLET ORAL
Qty: 0 | Refills: 0 | DISCHARGE
Start: 2019-10-18

## 2019-10-18 RX ORDER — BUMETANIDE 0.25 MG/ML
1 INJECTION INTRAMUSCULAR; INTRAVENOUS
Qty: 60 | Refills: 0
Start: 2019-10-18

## 2019-10-18 RX ADMIN — Medication 40 MILLIGRAM(S): at 11:45

## 2019-10-18 RX ADMIN — ZINC SULFATE TAB 220 MG (50 MG ZINC EQUIVALENT) 220 MILLIGRAM(S): 220 (50 ZN) TAB at 12:58

## 2019-10-18 RX ADMIN — AMIODARONE HYDROCHLORIDE 200 MILLIGRAM(S): 400 TABLET ORAL at 05:58

## 2019-10-18 RX ADMIN — Medication 100 GRAM(S): at 11:48

## 2019-10-18 RX ADMIN — Medication 81 MILLIGRAM(S): at 11:44

## 2019-10-18 RX ADMIN — LOSARTAN POTASSIUM 12.5 MILLIGRAM(S): 100 TABLET, FILM COATED ORAL at 11:44

## 2019-10-18 RX ADMIN — PANTOPRAZOLE SODIUM 40 MILLIGRAM(S): 20 TABLET, DELAYED RELEASE ORAL at 05:59

## 2019-10-18 RX ADMIN — METFORMIN HYDROCHLORIDE 1000 MILLIGRAM(S): 850 TABLET ORAL at 05:58

## 2019-10-18 RX ADMIN — HEPARIN SODIUM 5000 UNIT(S): 5000 INJECTION INTRAVENOUS; SUBCUTANEOUS at 05:58

## 2019-10-18 RX ADMIN — Medication 40 MILLIGRAM(S): at 05:57

## 2019-10-18 RX ADMIN — CHLORHEXIDINE GLUCONATE 1 APPLICATION(S): 213 SOLUTION TOPICAL at 05:59

## 2019-10-18 RX ADMIN — Medication 600 MILLIGRAM(S): at 05:59

## 2019-10-18 RX ADMIN — CARVEDILOL PHOSPHATE 3.12 MILLIGRAM(S): 80 CAPSULE, EXTENDED RELEASE ORAL at 05:58

## 2019-10-18 NOTE — PROGRESS NOTE ADULT - PROBLEM SELECTOR PROBLEM 2
Triple vessel disease of the heart

## 2019-10-18 NOTE — PROGRESS NOTE ADULT - NEUROLOGICAL DETAILS
alert and oriented x 3/normal strength
normal strength/alert and oriented x 3
alert and oriented x 3
alert and oriented x 3/normal strength

## 2019-10-18 NOTE — PROGRESS NOTE ADULT - CONSTITUTIONAL DETAILS
no distress/well-developed/well-groomed
well-developed/no distress
well-groomed/well-developed
no distress

## 2019-10-18 NOTE — PROGRESS NOTE ADULT - PROBLEM SELECTOR PROBLEM 3
RIC (acute kidney injury)

## 2019-10-18 NOTE — PROGRESS NOTE ADULT - GASTROINTESTINAL DETAILS
no rebound tenderness/no rigidity/no guarding/soft/nontender
soft/nontender
soft/normal
nontender/soft
soft/nontender

## 2019-10-18 NOTE — PROGRESS NOTE ADULT - HEIGHT IN FEET
Discussed and recommended the new shingles vaccination  Schedule DEXA scan.  Increase metformin from 500 mg twice a day up to 1000 mg twice a day  Low sugar low carbohydrate diet  Low-salt diet  Stay active  Exercise regularly and safely  Lose weight  Recommend regular dental examination.  Recommend regular eye examination.  See me 6 months for wellness with fasting FLP CBC CMP HBA1C Urine microalbumin.    Drink plenty of water  Eat more protein - meats, seafood, cheese, egg  Eating fats are fine - oils, whipped cream, tiburcio cheese dressing.  Fruits and Vegetables can be high in carbohydrates  NO juices, sweet teas, NO ARTIFICIAL SWEETENERS  NO starches - breads, pasta, rice, pizza, potatoes  “Low fat” products usually are high in carbohydrates - watch those labels  READ NUTRITION LABELS  Google EVERYTHING you eat:  “how many grams of carbs in _____”          5

## 2019-10-18 NOTE — PROGRESS NOTE ADULT - PROBLEM SELECTOR PROBLEM 1
Acute heart failure with reduced ejection fraction and diastolic dysfunction

## 2019-10-18 NOTE — PROGRESS NOTE ADULT - PROBLEM SELECTOR PLAN 1
ICM s/p CABG   EF remains ~20% after milrinone weaned off; restrictive filling pattern on echo   Patient remains overloaded   Plan for DC today by CT surgery team   Would recommend to start losartan 12.5 mg (given early in am, tolerated by BP)  Switch diuretic to Bumex 1 mg twice daily   Continue carvedilol 3.125 mg bid   LifeVest for discharge   Strict I/Os  Daily weight   Aggressive PT   Incentive inspirometry   Will follow up in office next Friday
Bedside echo shows LVEF ~ 15% with dilated LV cavity and  regional wall motion abnormalities. Elevated filling pressures. IVC 2.4 and non collapsible   Increase furosemide gtt to 8 mg/hr and give 20 mg ivp   Check electrolytes twice daily   Start spironolactone 25 mg daily tomorrow   Increase nitroglycerine gtt to 30 mcg/hr - keep uptitrating to goal of 50 mcg/hr for now   Keep holding betablockers   Switch NTG to captopril 6.25 mg tid  Case discussed with CCU team
Patient remains euvolemic on exam, however, TTE from yesterday shows restrictive filling pattern and pulmonary HTN with PASP ~ 60 mmHg  LVEF is 20-25%  Start furosemide 20 mg PO daily   Change metoprolol to long acting as follows:   Toprol XL 50 mg in am and 25 mg in PM   Start losartan 25 mg daily  Continue spironolactone 25 mg daily Strict I/Os  Daily weight   Aggressive PT and incentive inspirometry   Case discussed with CCU team, CT surgery and interventional cardiology
ICM s/p CABG   EF remains ~20% after milrinone weaned off; restrictive filling pattern on echo   Start losartan 25 mg daily   Increase carvedilol to 6.25 mg tomorrow   Continue with furosemide 40 mg daily   Pleural effusion on CXR - going for thoracentesis today  LifeVest for discharge   Strict I/Os  Daily weight   Aggressive PT   Incentive inspirometry   Case discussed with Dr. Escobar
Volume status improved. She looks euvolemic  POC US shows IVC 1.8 and collapsing less than 50%  Keep holding diuretics  Start metoprolol 12.5 mg q 6hrs  Continue captopril 6.25 mg q 8hrs - hold tomorrow morning for procedure    Continue spironolactone 25 mg daily - hold tomorrow for procedure as well  Strict I/Os  Daily weight   Aggressive PT and incentive inspirometry   Case discussed with CCU team
ICM s/p CABG   EF remains ~20% after milrinone weaned off; restrictive filling pattern on echo with IVC 2.4 and non collapsing   Consider diuresis with furosemide 20 mg iv BID   Start losartan 12.5 mg daily   Continue carvedilol 3.125 mg bid   LifeVest for discharge   Strict I/Os  Daily weight   Aggressive PT   Incentive inspirometry   Case discussed with CCU attending and RN

## 2019-10-18 NOTE — PROGRESS NOTE ADULT - REASON FOR ADMISSION
pulmonary edema, CHF, NSTEMI
HErEF
HFrEF
cad
HFrEF
NSTEMI
acute hypoxic respiratory failure
acute respiratory failure secondary to NSTEMI
cad
cad
dm
shortness of breath
sob
SOB
possible gi bleed
shortness of breath
Exertional shortness of breath for 1 day prior to admission
SOB
CHF/respiratory failure
S/P cabg
SOB
cdiff
chf, nstemi, pulmonary edema
preop for CABG
s/p CABG
s/p cABG/clip NUBIA
mi, acute chf
s/p CABG
s/p CABG/NUBIA clip
SOB

## 2019-10-18 NOTE — PROGRESS NOTE ADULT - CARDIOVASCULAR
Regular rate & rhythm, normal S1, S2; no murmurs, gallops or rubs; no S3, S4
detailed exam

## 2019-10-18 NOTE — PROGRESS NOTE ADULT - SUBJECTIVE AND OBJECTIVE BOX
Patient is a 68y old  Female who presents with a chief complaint of cad (18 Oct 2019 13:41)  pt seen and evaluated   OOB to chair   on p.o diet and has no complains  ICU Vital Signs Last 24 Hrs  T(C): 36.2 (18 Oct 2019 13:17), Max: 36.7 (17 Oct 2019 18:05)  T(F): 97.1 (18 Oct 2019 13:17), Max: 98 (17 Oct 2019 18:05)  HR: 81 (18 Oct 2019 13:17) (79 - 88)  BP: 103/65 (18 Oct 2019 13:17) (100/56 - 112/70)  RR: 18 (18 Oct 2019 13:17) (18 - 19)  SpO2: 100% (18 Oct 2019 11:52) (100% - 100%)      Drug Dosing Weight  Height (cm): 160 (16 Oct 2019 20:55)  Weight (kg): 65 (16 Oct 2019 20:55)  BMI (kg/m2): 25.4 (16 Oct 2019 20:55)  BSA (m2): 1.68 (16 Oct 2019 20:55)    I&O's Detail    17 Oct 2019 07:01  -  18 Oct 2019 07:00  --------------------------------------------------------  IN:    Oral Fluid: 700 mL  Total IN: 700 mL    OUT:    Indwelling Catheter - Urethral: 600 mL  Total OUT: 600 mL    Total NET: 100 mL      18 Oct 2019 07:01  -  18 Oct 2019 14:28  --------------------------------------------------------  IN:    Oral Fluid: 550 mL  Total IN: 550 mL    OUT:  Total OUT: 0 mL    Total NET: 550 mL    PHYSICAL EXAM:  Constitutional: A+O x3  OOB to chair  Gastrointestinal: soft n/t n/d  Extremities no edema  Skin: nl skin color  MEDICATIONS  (STANDING):  amiodarone    Tablet 400 milliGRAM(s) Oral daily  aspirin enteric coated 81 milliGRAM(s) Oral daily  atorvastatin 40 milliGRAM(s) Oral at bedtime  buMETAnide 1 milliGRAM(s) Oral every 12 hours  carvedilol 3.125 milliGRAM(s) Oral every 12 hours  chlorhexidine 4% Liquid 1 Application(s) Topical <User Schedule>  dextrose 40% Gel 15 Gram(s) Oral once  dextrose 5%. 1000 milliLiter(s) (50 mL/Hr) IV Continuous <Continuous>  dextrose 50% Injectable 50 milliLiter(s) IV Push every 15 minutes  dextrose 50% Injectable 25 milliLiter(s) IV Push every 15 minutes  guaiFENesin  milliGRAM(s) Oral every 12 hours  heparin  Injectable 5000 Unit(s) SubCutaneous every 12 hours  insulin lispro (HumaLOG) corrective regimen sliding scale   SubCutaneous three times a day before meals  losartan 12.5 milliGRAM(s) Oral daily  magnesium sulfate  IVPB 1 Gram(s) IV Intermittent daily  metFORMIN 1000 milliGRAM(s) Oral two times a day  pantoprazole    Tablet 40 milliGRAM(s) Oral before breakfast  QUEtiapine 25 milliGRAM(s) Oral at bedtime  sodium chloride 0.9%. 1000 milliLiter(s) (10 mL/Hr) IV Continuous <Continuous>  tamsulosin 0.4 milliGRAM(s) Oral at bedtime  tiotropium 18 MICROgram(s) Capsule 1 Capsule(s) Inhalation daily  zinc sulfate 220 milliGRAM(s) Oral daily      Diet, Consistent Carbohydrate/No Snacks:   DASH/TLC Sodium & Cholesterol Restricted  Chris(7 Gm Arginine/7 Gm Glut/1.2 Gm HMB     Qty per Day:  2  Prosource Gelatein 20 Sugar Free     Qty per Day:  1  Supplement Feeding Modality:  Oral  Glucerna Shake Cans or Servings Per Day:  1       Frequency:  Daily (10-16-19 @ 17:46)      LABS  10-18    139  |  103  |  42<H>  ----------------------------<  144<H>  4.4   |  23  |  1.0    Ca    9.2      18 Oct 2019 01:07  Mg     2.2     10-17                       9.5    5.50  )-----------( 272      ( 18 Oct 2019 01:07 )             31.5     CAPILLARY BLOOD GLUCOSE  POCT Blood Glucose.: 123 mg/dL (18 Oct 2019 12:03)  POCT Blood Glucose.: 122 mg/dL (18 Oct 2019 07:44)   RADIOLOGY STUDIES    < from: Xray Chest 1 View- PORTABLE-Routine (10.18.19 @ 08:44) >  Impression:    Left lower lobe opacity/pleural effusion unchanged. No air leak.

## 2019-10-18 NOTE — PROGRESS NOTE ADULT - PROBLEM SELECTOR PLAN 5
Rate controlled   Continue HF management as above   Keep electrolytes stable   If patient doesn't covert consider DCCV   No A/C per primary team due to recent history of GI bleeding
In sinus rhythm  Started on amiodarone    Continue HF management as above   Keep electrolytes stable   No A/C per primary team due to recent history of GI bleeding
In sinus rhythm now  Started on amiodarone    Continue HF management as above   Keep electrolytes stable   No A/C per primary team due to recent history of GI bleeding

## 2019-10-18 NOTE — PROGRESS NOTE ADULT - PROBLEM SELECTOR PLAN 4
Hb stable at 10 gr/dl   Recent GI bleeding   Recommend GI evaluation for possible EGD/colonoscopy
iron deficient   recommend supplement with iv iron sucrose 200 mg daily x5 days or 300 mg daily for 3 days  Patient to get 1 unit of blood transfusion per primary team
Hb stable at 9.5 gr/dl   Recent GI bleeding   Recommend GI evaluation for possible EGD/colonoscopy
iron deficient   Patient s/p 1 PRBC   She might still benefit from iv iron supplementation - can recheck iron profile
Hb stable at 10 gr/dl   Recent GI bleeding   Recommend GI evaluation for possible EGD/colonoscopy
iron deficient   recommend supplement with iv iron sucrose 200 mg daily x5 days or 300 mg daily for 3 days

## 2019-10-18 NOTE — PROGRESS NOTE ADULT - MS EXT PE MLT D E PC
no pedal edema
no cyanosis/no pedal edema
no pedal edema
no pedal edema
pedal edema

## 2019-10-18 NOTE — PROGRESS NOTE ADULT - PROBLEM SELECTOR PLAN 2
80% lesion of LM and TVD   S/p CABG - no surgical complications   Aggressive physical therapy   Incentive inspirometry   Start glucerna 2-3 times per day
80% lesion of LM and TVD   S/p CABG - no surgical complications   Aggressive physical therapy   Incentive inspirometry   Start Glucerna 2-3 times per day
80% lesion of LM and TVD   Presented at Heart Team conference; given her hemodynamic improvement the patient would be better served with a CABG procedure given the low LVEF, triple vessel disease, including left main disease and the fact that she is a diabetic   Plan for surgery possibly next week   Hold clopidogrel   Daily VerifyNow to assess platelet function   Aggressive physical therapy   Incentive inspirometry   Start glucerna 2-3 times per day
80% lesion of LM and TVD   Evaluated by CT surgery - she was deemed not a candidate for CABG  Plan for protected PCI tomorrow by intervention cardiology   Continue DAPT  Consider switching A/C to iv heparin instead of enoxaparin
80% lesion of LM and TVD   S/p CABG - no surgical complications   Aggressive physical therapy   Incentive inspirometry   Start Glucerna 2-3 times per day
80% lesion of LM and TVD   Elevaluated by CT surgery and intervention  Possible PCI on Monday or Tuesday per interventionalist  Continue DAPT and A/C

## 2019-10-18 NOTE — PROGRESS NOTE ADULT - PROBLEM SELECTOR PROBLEM 4
Anemia due to other cause

## 2019-10-18 NOTE — PROGRESS NOTE ADULT - PROVIDER SPECIALTY LIST ADULT
CCU
CT Surgery
Cardiology
Critical Care
Endocrinology
GYN
Gastroenterology
Heart Failure
Hospitalist
Hospitalist
Infectious Disease
Internal Medicine
Nutrition Support
Cardiology
CT Surgery
Cardiology
Cardiology
Internal Medicine
Heart Failure

## 2019-10-18 NOTE — PROGRESS NOTE ADULT - SUBJECTIVE AND OBJECTIVE BOX
OPERATIVE PROCEDURE(s):   cabg x 3             POD # 11    SURGEON(s): yuli    SUBJECTIVE ASSESSMENT: pt is without complaints    Vital Signs Last 24 Hrs  T(C): 36.2 (18 Oct 2019 13:17), Max: 36.7 (17 Oct 2019 18:05)  T(F): 97.1 (18 Oct 2019 13:17), Max: 98 (17 Oct 2019 18:05)  HR: 81 (18 Oct 2019 13:17) (79 - 88)  BP: 103/65 (18 Oct 2019 13:17) (100/56 - 112/70)  BP(mean): --  RR: 18 (18 Oct 2019 13:17) (18 - 19)  SpO2: 100% (18 Oct 2019 11:52) (100% - 100%)  10-17-19 @ 07:01  -  10-18-19 @ 07:00  --------------------------------------------------------  IN: 700 mL / OUT: 600 mL / NET: 100 mL    10-18-19 @ 07:01  -  10-18-19 @ 13:42  --------------------------------------------------------  IN: 550 mL / OUT: 0 mL / NET: 550 mL        Physical Exam  General: alert and oriented x 3  Chest:  cta bl  CVS: s1s2  Abd: pos bs soft nt  GI/ :  Ext: 2+ edema  incisions- c/d/i    LABS:                        9.5    5.50  )-----------( 272      ( 18 Oct 2019 01:07 )             31.5     COUMADIN:   [ ] YES [ x] NO      10-18    139  |  103  |  42<H>  ----------------------------<  144<H>  4.4   |  23  |  1.0    Ca    9.2      18 Oct 2019 01:07  Mg     2.2     10-17          MEDICATIONS  (STANDING):  amiodarone    Tablet 400 milliGRAM(s) Oral daily  aspirin enteric coated 81 milliGRAM(s) Oral daily  atorvastatin 40 milliGRAM(s) Oral at bedtime  carvedilol 3.125 milliGRAM(s) Oral every 12 hours  chlorhexidine 4% Liquid 1 Application(s) Topical <User Schedule>  dextrose 40% Gel 15 Gram(s) Oral once  dextrose 5%. 1000 milliLiter(s) (50 mL/Hr) IV Continuous <Continuous>  dextrose 50% Injectable 50 milliLiter(s) IV Push every 15 minutes  dextrose 50% Injectable 25 milliLiter(s) IV Push every 15 minutes  guaiFENesin  milliGRAM(s) Oral every 12 hours  heparin  Injectable 5000 Unit(s) SubCutaneous every 12 hours  insulin lispro (HumaLOG) corrective regimen sliding scale   SubCutaneous three times a day before meals  losartan 12.5 milliGRAM(s) Oral daily  magnesium sulfate  IVPB 1 Gram(s) IV Intermittent daily  metFORMIN 1000 milliGRAM(s) Oral two times a day  pantoprazole    Tablet 40 milliGRAM(s) Oral before breakfast  QUEtiapine 25 milliGRAM(s) Oral at bedtime  sodium chloride 0.9%. 1000 milliLiter(s) (10 mL/Hr) IV Continuous <Continuous>  tamsulosin 0.4 milliGRAM(s) Oral at bedtime  tiotropium 18 MICROgram(s) Capsule 1 Capsule(s) Inhalation daily  zinc sulfate 220 milliGRAM(s) Oral daily    MEDICATIONS  (PRN):  acetaminophen   Tablet .. 650 milliGRAM(s) Oral every 6 hours PRN Mild Pain (1 - 3)  ALPRAZolam 0.25 milliGRAM(s) Oral two times a day PRN anxiety  dextrose 40% Gel 15 Gram(s) Oral once PRN Blood Glucose LESS THAN 70 milliGRAM(s)/deciliter  gabapentin 100 milliGRAM(s) Oral two times a day PRN leg pain  glucagon  Injectable 1 milliGRAM(s) IntraMuscular once PRN Glucose LESS THAN 70 milligrams/deciliter      Allergies    No Known Allergies    Intolerances    Ambulation/Activity Status:    ambulates     RADIOLOGY & ADDITIONAL TESTS:    EXAM:  XR CHEST PORTABLE ROUTINE 1V            PROCEDURE DATE:  10/17/2019        INTERPRETATION:  Clinical History / Reason for exam: Shortness of breath    Comparison : Chest radiograph from 10/16/2019.    Technique/Positioning: Single frontal view of chest.    Findings:    Support devices: None.    Cardiac/mediastinum/hilum: Stable. Left atrial appendage clip. Epicardial   wires.    Lung parenchyma/Pleura: Stable bilateral pleural effusions/opacities,   left greater than right. No pneumothorax.    Skeleton/soft tissues: Stable.    Impression:      Stable bilateral pleural effusions/opacities, left greater than right.    Assessment/Plan: Patient is a 67 yo female s/p cabg pod # 11  cont present tx as per ct surgeon  s/p cabg- cont asa, statin, and coreg  dm- cont oral agents and f/u with endocrine   acute on chronic systolic dysfunction chf- cont diuretics with bumex and start low dose losartan; maintain lifevest and pt will f/u with dr somers of the heart failure team  f/u with urogysophie re urinary retention; pt to go home today with leg bag      Social Service Disposition:  home today

## 2019-10-18 NOTE — PROGRESS NOTE ADULT - NEUROLOGICAL
detailed exam
Alert & oriented; no sensory, motor or coordination deficits, normal reflexes
detailed exam

## 2019-10-18 NOTE — PROGRESS NOTE ADULT - PROBLEM SELECTOR PLAN 3
Stable   Creat 1.0  Avoid nephrotoxins
Stable   Creat 1.0  Diuretics as above   Avoid nephrotoxins
Stable   Creat 1.0  Avoid nephrotoxins
Stable   Creat 0.7  Avoid nephrotoxins
Stable   Creat 1.2   Avoid nephrotoxins  Consider very gentle hydration in am to prevent SHIVA
Stable   Creat 1.2   Avoid nephrotoxins

## 2019-10-18 NOTE — PROGRESS NOTE ADULT - SUBJECTIVE AND OBJECTIVE BOX
SUBJ:  No new complains    MEDICATIONS  (STANDING):  amiodarone    Tablet 200 milliGRAM(s) Oral daily  aspirin enteric coated 81 milliGRAM(s) Oral daily  atorvastatin 40 milliGRAM(s) Oral at bedtime  carvedilol 3.125 milliGRAM(s) Oral every 12 hours  chlorhexidine 4% Liquid 1 Application(s) Topical <User Schedule>  dextrose 40% Gel 15 Gram(s) Oral once  dextrose 5%. 1000 milliLiter(s) (50 mL/Hr) IV Continuous <Continuous>  dextrose 50% Injectable 50 milliLiter(s) IV Push every 15 minutes  dextrose 50% Injectable 25 milliLiter(s) IV Push every 15 minutes  furosemide    Tablet 40 milliGRAM(s) Oral daily  guaiFENesin  milliGRAM(s) Oral every 12 hours  heparin  Injectable 5000 Unit(s) SubCutaneous every 12 hours  insulin lispro (HumaLOG) corrective regimen sliding scale   SubCutaneous three times a day before meals  losartan 12.5 milliGRAM(s) Oral daily  magnesium sulfate  IVPB 1 Gram(s) IV Intermittent daily  metFORMIN 1000 milliGRAM(s) Oral two times a day  pantoprazole    Tablet 40 milliGRAM(s) Oral before breakfast  QUEtiapine 25 milliGRAM(s) Oral at bedtime  sodium chloride 0.9%. 1000 milliLiter(s) (10 mL/Hr) IV Continuous <Continuous>  tamsulosin 0.4 milliGRAM(s) Oral at bedtime  tiotropium 18 MICROgram(s) Capsule 1 Capsule(s) Inhalation daily  zinc sulfate 220 milliGRAM(s) Oral daily    MEDICATIONS  (PRN):  acetaminophen   Tablet .. 650 milliGRAM(s) Oral every 6 hours PRN Mild Pain (1 - 3)  ALPRAZolam 0.25 milliGRAM(s) Oral two times a day PRN anxiety  dextrose 40% Gel 15 Gram(s) Oral once PRN Blood Glucose LESS THAN 70 milliGRAM(s)/deciliter  gabapentin 100 milliGRAM(s) Oral two times a day PRN leg pain  glucagon  Injectable 1 milliGRAM(s) IntraMuscular once PRN Glucose LESS THAN 70 milligrams/deciliter            Vital Signs Last 24 Hrs  T(C): 36.1 (18 Oct 2019 09:30), Max: 36.7 (17 Oct 2019 18:05)  T(F): 97 (18 Oct 2019 09:30), Max: 98 (17 Oct 2019 18:05)  HR: 79 (18 Oct 2019 09:30) (79 - 92)  BP: 101/50 (18 Oct 2019 09:30) (99/56 - 112/70)  BP(mean): --  RR: 18 (18 Oct 2019 09:30) (18 - 19)  SpO2: --     REVIEW OF SYSTEMS:  CONSTITUTIONAL: No fever, weight loss, or fatigue  CARDIOLOGY: PAtient denies chest pain, shortness of breath or syncopal episodes.   RESPIRATORY: denies shortness of breath, wheezeing.   NEUROLOGICAL: NO weakness, no focal deficits to report.  ENDOCRINOLOGICAL: no recent change in diabetic medications.   GI: no BRBPR, no N,V,diarrhea.    PSYCHIATRY: normal mood and affect  HEENT: no nasal discharge, no ecchymosis  SKIN: no ecchymosis, no breakdown  MUSCULOSKELETAL: Full range of motion x4.        PHYSICAL EXAM:  · CONSTITUTIONAL:	Well-developed, well nourished    BMI-  ·RESPIRATORY:   airway patent; breath sounds equal; good air movement; respirations non-labored; clear to auscultation bilaterally; no chest wall tenderness; no intercostal retractions; no rales,rhonchi or wheeze  · CARDIOVASCULAR	regular rate and rhythm  no rub  no murmur  normal PMI  · EXTREMITIES: No cyanosis, clubbing or edema  · VASCULAR: 	Equal and normal pulses (carotid, femoral, dorsalis pedis)  	  TELEMETRY:    ECG:    TTE:    LABS:                        9.5    5.50  )-----------( 272      ( 18 Oct 2019 01:07 )             31.5     10-18    139  |  103  |  42<H>  ----------------------------<  144<H>  4.4   |  23  |  1.0    Ca    9.2      18 Oct 2019 01:07  Mg     2.2     10-17              I&O's Summary    17 Oct 2019 07:01  -  18 Oct 2019 07:00  --------------------------------------------------------  IN: 700 mL / OUT: 600 mL / NET: 100 mL      BNP  RADIOLOGY & ADDITIONAL STUDIES:    IMPRESSION AND PLAN:    Patient is NSR  Discharge on Amiodarone 400 mg daily for one month.

## 2019-10-18 NOTE — PROGRESS NOTE ADULT - SUBJECTIVE AND OBJECTIVE BOX
Interval history: Patient sitting in chair OOB, she is fluid overloaded and seems anxious due to possible dc today.       HPI       67 y/o F with h/o DM, recent hip replacement admitted with c/o sob that started acutely 6 days ago. Per family, patient was lethargic, sob and complaint of nausea. She also felt chest discomfort that radiated to the back. Upon admission, an echocardiogram showed LVEF ~ 15% from ~ 45% one month earlier. Her troponin were elevated and pro-BNP was 20 k. A lHC showed triple vessel disease with 80% disease of LM.      Now she is s/p CABG and NUBIA clipping.       PMH: DM, Hip fracture     Social: Denies ETOH, smoking     FH: father  from heart disease in his 50s, mother  in her 30s from cancer

## 2019-10-18 NOTE — PROGRESS NOTE ADULT - [facility] HE PE MLT D E PC
no murmur/regular rate and rhythm
regular rate and rhythm
regular rate and rhythm/no rub/no murmur
Regular heart sounds, no murmurs
no murmur/regular rate and rhythm

## 2019-10-18 NOTE — PROGRESS NOTE ADULT - ASSESSMENT
ASSESSMENT/PLAN  - 69 yo female admitted 9/21 with NSTEMI, CHF, pulm edema, resp failure, DKA  - hospital course included CABG, + C difficile, episode of melena, perineal and gluteal skin breakdown  continue with current nutrition   encourage po diet mostly protein   check bmp/phos/mg and correct lytes

## 2019-10-20 ENCOUNTER — FORM ENCOUNTER (OUTPATIENT)
Age: 69
End: 2019-10-20

## 2019-10-21 ENCOUNTER — OUTPATIENT (OUTPATIENT)
Dept: OUTPATIENT SERVICES | Facility: HOSPITAL | Age: 69
LOS: 1 days | Discharge: HOME | End: 2019-10-21

## 2019-10-21 ENCOUNTER — OUTPATIENT (OUTPATIENT)
Dept: OUTPATIENT SERVICES | Facility: HOSPITAL | Age: 69
LOS: 1 days | Discharge: HOME | End: 2019-10-21
Payer: MEDICARE

## 2019-10-21 ENCOUNTER — APPOINTMENT (OUTPATIENT)
Dept: CARDIOTHORACIC SURGERY | Facility: CLINIC | Age: 69
End: 2019-10-21
Payer: MEDICARE

## 2019-10-21 VITALS
WEIGHT: 134 LBS | HEART RATE: 69 BPM | TEMPERATURE: 97.4 F | RESPIRATION RATE: 12 BRPM | DIASTOLIC BLOOD PRESSURE: 59 MMHG | SYSTOLIC BLOOD PRESSURE: 103 MMHG | BODY MASS INDEX: 26.31 KG/M2 | HEIGHT: 60 IN

## 2019-10-21 DIAGNOSIS — Z98.890 OTHER SPECIFIED POSTPROCEDURAL STATES: Chronic | ICD-10-CM

## 2019-10-21 DIAGNOSIS — Z95.1 PRESENCE OF AORTOCORONARY BYPASS GRAFT: ICD-10-CM

## 2019-10-21 DIAGNOSIS — N83.202 UNSPECIFIED OVARIAN CYST, LEFT SIDE: ICD-10-CM

## 2019-10-21 LAB
ALBUMIN SERPL ELPH-MCNC: 3.6 G/DL
ALP BLD-CCNC: 124 U/L
ALT SERPL-CCNC: 14 U/L
ANION GAP SERPL CALC-SCNC: 19 MMOL/L
AST SERPL-CCNC: 20 U/L
BASOPHILS # BLD AUTO: 0.01 K/UL
BASOPHILS NFR BLD AUTO: 0.2 %
BILIRUB SERPL-MCNC: 0.5 MG/DL
BUN SERPL-MCNC: 55 MG/DL
CALCIUM SERPL-MCNC: 9.4 MG/DL
CHLORIDE SERPL-SCNC: 100 MMOL/L
CO2 SERPL-SCNC: 22 MMOL/L
CREAT SERPL-MCNC: 1.3 MG/DL
EOSINOPHIL # BLD AUTO: 0.12 K/UL
EOSINOPHIL NFR BLD AUTO: 2 %
GLUCOSE SERPL-MCNC: 163 MG/DL
HCT VFR BLD CALC: 32.7 %
HGB BLD-MCNC: 10.1 G/DL
IMM GRANULOCYTES NFR BLD AUTO: 0.5 %
LYMPHOCYTES # BLD AUTO: 0.72 K/UL
LYMPHOCYTES NFR BLD AUTO: 12.1 %
MAN DIFF?: NORMAL
MCHC RBC-ENTMCNC: 28.1 PG
MCHC RBC-ENTMCNC: 30.9 G/DL
MCV RBC AUTO: 90.8 FL
MONOCYTES # BLD AUTO: 0.33 K/UL
MONOCYTES NFR BLD AUTO: 5.6 %
NEUTROPHILS # BLD AUTO: 4.73 K/UL
NEUTROPHILS NFR BLD AUTO: 79.6 %
PLATELET # BLD AUTO: 285 K/UL
POTASSIUM SERPL-SCNC: 5 MMOL/L
PROT SERPL-MCNC: 5.9 G/DL
RBC # BLD: 3.6 M/UL
RBC # FLD: 16.7 %
SODIUM SERPL-SCNC: 141 MMOL/L
WBC # FLD AUTO: 5.94 K/UL

## 2019-10-21 PROCEDURE — 99024 POSTOP FOLLOW-UP VISIT: CPT

## 2019-10-21 PROCEDURE — 71046 X-RAY EXAM CHEST 2 VIEWS: CPT | Mod: 26

## 2019-10-21 RX ORDER — AMLODIPINE BESYLATE 5 MG/1
5 TABLET ORAL
Qty: 30 | Refills: 0 | Status: DISCONTINUED | COMMUNITY
Start: 2019-08-15 | End: 2019-10-21

## 2019-10-21 RX ORDER — POLYETHYLENE GLYCOL 3350 17 G/17G
17 POWDER, FOR SOLUTION ORAL
Qty: 238 | Refills: 0 | Status: DISCONTINUED | COMMUNITY
Start: 2019-09-04 | End: 2019-10-21

## 2019-10-21 RX ORDER — HYDROCORTISONE 25 MG/G
2.5 CREAM TOPICAL
Qty: 28 | Refills: 0 | Status: DISCONTINUED | COMMUNITY
Start: 2019-08-18 | End: 2019-10-21

## 2019-10-21 RX ORDER — SITAGLIPTIN 50 MG/1
50 TABLET, FILM COATED ORAL
Qty: 30 | Refills: 0 | Status: DISCONTINUED | COMMUNITY
Start: 2019-08-15 | End: 2019-10-21

## 2019-10-21 RX ORDER — ESTRADIOL 0.1 MG/G
0.1 CREAM VAGINAL
Qty: 1 | Refills: 3 | Status: DISCONTINUED | COMMUNITY
Start: 2019-09-20 | End: 2019-10-21

## 2019-10-21 RX ORDER — BACLOFEN 20 MG/1
20 TABLET ORAL
Qty: 60 | Refills: 1 | Status: DISCONTINUED | COMMUNITY
Start: 2019-09-20 | End: 2019-10-21

## 2019-10-22 LAB
GLUCOSE BLDC GLUCOMTR-MCNC: 70 MG/DL — SIGNIFICANT CHANGE UP (ref 70–99)
GLUCOSE BLDC GLUCOMTR-MCNC: 89 MG/DL — SIGNIFICANT CHANGE UP (ref 70–99)

## 2019-10-23 DIAGNOSIS — Z02.9 ENCOUNTER FOR ADMINISTRATIVE EXAMINATIONS, UNSPECIFIED: ICD-10-CM

## 2019-10-24 ENCOUNTER — FORM ENCOUNTER (OUTPATIENT)
Age: 69
End: 2019-10-24

## 2019-10-24 ENCOUNTER — OUTPATIENT (OUTPATIENT)
Dept: OUTPATIENT SERVICES | Facility: HOSPITAL | Age: 69
LOS: 1 days | Discharge: HOME | End: 2019-10-24

## 2019-10-24 DIAGNOSIS — Z98.890 OTHER SPECIFIED POSTPROCEDURAL STATES: Chronic | ICD-10-CM

## 2019-10-24 DIAGNOSIS — N18.1 CHRONIC KIDNEY DISEASE, STAGE 1: ICD-10-CM

## 2019-10-24 DIAGNOSIS — L90.9 ATROPHIC DISORDER OF SKIN, UNSPECIFIED: ICD-10-CM

## 2019-10-25 ENCOUNTER — APPOINTMENT (OUTPATIENT)
Dept: CARDIOTHORACIC SURGERY | Facility: CLINIC | Age: 69
End: 2019-10-25
Payer: MEDICARE

## 2019-10-25 ENCOUNTER — APPOINTMENT (OUTPATIENT)
Dept: UROGYNECOLOGY | Facility: CLINIC | Age: 69
End: 2019-10-25
Payer: OTHER MISCELLANEOUS

## 2019-10-25 ENCOUNTER — APPOINTMENT (OUTPATIENT)
Dept: CARDIOLOGY | Facility: CLINIC | Age: 69
End: 2019-10-25
Payer: MEDICARE

## 2019-10-25 ENCOUNTER — OUTPATIENT (OUTPATIENT)
Dept: OUTPATIENT SERVICES | Facility: HOSPITAL | Age: 69
LOS: 1 days | Discharge: HOME | End: 2019-10-25
Payer: MEDICARE

## 2019-10-25 ENCOUNTER — OUTPATIENT (OUTPATIENT)
Dept: OUTPATIENT SERVICES | Facility: HOSPITAL | Age: 69
LOS: 1 days | Discharge: HOME | End: 2019-10-25

## 2019-10-25 VITALS
DIASTOLIC BLOOD PRESSURE: 58 MMHG | BODY MASS INDEX: 25.13 KG/M2 | HEIGHT: 60 IN | SYSTOLIC BLOOD PRESSURE: 102 MMHG | WEIGHT: 128 LBS

## 2019-10-25 VITALS
BODY MASS INDEX: 25.13 KG/M2 | HEART RATE: 72 BPM | TEMPERATURE: 97.5 F | RESPIRATION RATE: 14 BRPM | DIASTOLIC BLOOD PRESSURE: 63 MMHG | SYSTOLIC BLOOD PRESSURE: 109 MMHG | HEIGHT: 60 IN | WEIGHT: 128 LBS

## 2019-10-25 VITALS
SYSTOLIC BLOOD PRESSURE: 110 MMHG | WEIGHT: 128 LBS | DIASTOLIC BLOOD PRESSURE: 68 MMHG | BODY MASS INDEX: 25 KG/M2 | HEART RATE: 72 BPM

## 2019-10-25 DIAGNOSIS — Z95.1 PRESENCE OF AORTOCORONARY BYPASS GRAFT: ICD-10-CM

## 2019-10-25 DIAGNOSIS — N95.2 POSTMENOPAUSAL ATROPHIC VAGINITIS: ICD-10-CM

## 2019-10-25 DIAGNOSIS — Z98.890 OTHER SPECIFIED POSTPROCEDURAL STATES: Chronic | ICD-10-CM

## 2019-10-25 DIAGNOSIS — Z86.39 PERSONAL HISTORY OF OTHER ENDOCRINE, NUTRITIONAL AND METABOLIC DISEASE: ICD-10-CM

## 2019-10-25 LAB
ALBUMIN SERPL ELPH-MCNC: 3.7 G/DL
ALP BLD-CCNC: 114 U/L
ALT SERPL-CCNC: 9 U/L
ANION GAP SERPL CALC-SCNC: 16 MMOL/L
AST SERPL-CCNC: 14 U/L
BASOPHILS # BLD AUTO: 0.01 K/UL
BASOPHILS NFR BLD AUTO: 0.2 %
BILIRUB SERPL-MCNC: 0.3 MG/DL
BUN SERPL-MCNC: 55 MG/DL
CALCIUM SERPL-MCNC: 9.3 MG/DL
CHLORIDE SERPL-SCNC: 99 MMOL/L
CO2 SERPL-SCNC: 26 MMOL/L
CREAT SERPL-MCNC: 1.3 MG/DL
EOSINOPHIL # BLD AUTO: 0.12 K/UL
EOSINOPHIL NFR BLD AUTO: 2.3 %
GLUCOSE SERPL-MCNC: 103 MG/DL
HCT VFR BLD CALC: 32.5 %
HGB BLD-MCNC: 9.9 G/DL
IMM GRANULOCYTES NFR BLD AUTO: 0.8 %
LYMPHOCYTES # BLD AUTO: 0.86 K/UL
LYMPHOCYTES NFR BLD AUTO: 16.5 %
MAN DIFF?: NORMAL
MCHC RBC-ENTMCNC: 27.4 PG
MCHC RBC-ENTMCNC: 30.5 G/DL
MCV RBC AUTO: 90 FL
MONOCYTES # BLD AUTO: 0.37 K/UL
MONOCYTES NFR BLD AUTO: 7.1 %
NEUTROPHILS # BLD AUTO: 3.8 K/UL
NEUTROPHILS NFR BLD AUTO: 73.1 %
PLATELET # BLD AUTO: 355 K/UL
POTASSIUM SERPL-SCNC: 4.9 MMOL/L
PROT SERPL-MCNC: 6 G/DL
RBC # BLD: 3.61 M/UL
RBC # FLD: 17.3 %
SODIUM SERPL-SCNC: 141 MMOL/L
WBC # FLD AUTO: 5.2 K/UL

## 2019-10-25 PROCEDURE — 51700 IRRIGATION OF BLADDER: CPT

## 2019-10-25 PROCEDURE — 93000 ELECTROCARDIOGRAM COMPLETE: CPT

## 2019-10-25 PROCEDURE — 99024 POSTOP FOLLOW-UP VISIT: CPT

## 2019-10-25 PROCEDURE — 71046 X-RAY EXAM CHEST 2 VIEWS: CPT | Mod: 26

## 2019-10-25 PROCEDURE — 99215 OFFICE O/P EST HI 40 MIN: CPT

## 2019-10-25 RX ORDER — LOSARTAN POTASSIUM 25 MG/1
25 TABLET, FILM COATED ORAL
Refills: 0 | Status: DISCONTINUED | COMMUNITY
Start: 2019-08-15 | End: 2019-10-25

## 2019-10-25 NOTE — COUNSELING
[FreeTextEntry1] : \par Please call our office if you should experience a fever greater than 100.4\par \par Please continue to apply a pea size amount of the cream to the opening of the vagina three nights per week. \par \par Please return to the office in 1 month for [place] change

## 2019-10-25 NOTE — DISCUSSION/SUMMARY
[FreeTextEntry1] : \par Incomplete bladder emptying-\par Patient placed into lithotomy position. \par \par 14 Fr montejo catheter placed without difficulty using sterile technique for a return of 10 cc of clear yellow urine. Instilled 10 cc of sterile water into the balloon. Leg bag attached without difficulty. Precautions provided.\par \par Patient will return in 1 month for her next montejo change. \par \par Atrophic Vaginitis:\par Advised to continue to apply a pea size amount of the cream to the opening of the vagina three nights per week.

## 2019-10-25 NOTE — HISTORY OF PRESENT ILLNESS
[FreeTextEntry1] : RN\par \par Patient is here for a voiding trial\par Last seen 9/20 for urodynamics for incomplete bladder emptying\par \par H/o DM, A1c: 15\par H/o neuropathy, on Neurontin\par \par s/p CABG\par \par Failed pessary fitting \par Estrace cream for atrophy\par s/p Baclofen (hallucinations)\par Flomax 0.4 mg at bedtime

## 2019-10-26 PROBLEM — Z86.39 HISTORY OF DIABETES MELLITUS: Status: RESOLVED | Noted: 2019-10-26 | Resolved: 2019-10-26

## 2019-10-28 DIAGNOSIS — J96.01 ACUTE RESPIRATORY FAILURE WITH HYPOXIA: ICD-10-CM

## 2019-10-28 DIAGNOSIS — R57.0 CARDIOGENIC SHOCK: ICD-10-CM

## 2019-10-28 DIAGNOSIS — N81.4 UTEROVAGINAL PROLAPSE, UNSPECIFIED: ICD-10-CM

## 2019-10-28 DIAGNOSIS — I34.0 NONRHEUMATIC MITRAL (VALVE) INSUFFICIENCY: ICD-10-CM

## 2019-10-28 DIAGNOSIS — Z79.84 LONG TERM (CURRENT) USE OF ORAL HYPOGLYCEMIC DRUGS: ICD-10-CM

## 2019-10-28 DIAGNOSIS — E11.9 TYPE 2 DIABETES MELLITUS WITHOUT COMPLICATIONS: ICD-10-CM

## 2019-10-28 DIAGNOSIS — I35.0 NONRHEUMATIC AORTIC (VALVE) STENOSIS: ICD-10-CM

## 2019-10-28 DIAGNOSIS — J44.9 CHRONIC OBSTRUCTIVE PULMONARY DISEASE, UNSPECIFIED: ICD-10-CM

## 2019-10-28 DIAGNOSIS — R06.02 SHORTNESS OF BREATH: ICD-10-CM

## 2019-10-28 DIAGNOSIS — D62 ACUTE POSTHEMORRHAGIC ANEMIA: ICD-10-CM

## 2019-10-28 DIAGNOSIS — A04.72 ENTEROCOLITIS DUE TO CLOSTRIDIUM DIFFICILE, NOT SPECIFIED AS RECURRENT: ICD-10-CM

## 2019-10-28 DIAGNOSIS — I25.5 ISCHEMIC CARDIOMYOPATHY: ICD-10-CM

## 2019-10-28 DIAGNOSIS — K92.2 GASTROINTESTINAL HEMORRHAGE, UNSPECIFIED: ICD-10-CM

## 2019-10-28 DIAGNOSIS — D64.9 ANEMIA, UNSPECIFIED: ICD-10-CM

## 2019-10-28 DIAGNOSIS — I21.4 NON-ST ELEVATION (NSTEMI) MYOCARDIAL INFARCTION: ICD-10-CM

## 2019-10-28 DIAGNOSIS — R33.9 RETENTION OF URINE, UNSPECIFIED: ICD-10-CM

## 2019-10-28 DIAGNOSIS — E87.2 ACIDOSIS: ICD-10-CM

## 2019-10-28 DIAGNOSIS — N18.3 CHRONIC KIDNEY DISEASE, STAGE 3 (MODERATE): ICD-10-CM

## 2019-10-28 DIAGNOSIS — I13.0 HYPERTENSIVE HEART AND CHRONIC KIDNEY DISEASE WITH HEART FAILURE AND STAGE 1 THROUGH STAGE 4 CHRONIC KIDNEY DISEASE, OR UNSPECIFIED CHRONIC KIDNEY DISEASE: ICD-10-CM

## 2019-10-28 DIAGNOSIS — G92 TOXIC ENCEPHALOPATHY: ICD-10-CM

## 2019-10-28 DIAGNOSIS — Z87.891 PERSONAL HISTORY OF NICOTINE DEPENDENCE: ICD-10-CM

## 2019-10-28 DIAGNOSIS — N17.9 ACUTE KIDNEY FAILURE, UNSPECIFIED: ICD-10-CM

## 2019-10-28 DIAGNOSIS — I25.10 ATHEROSCLEROTIC HEART DISEASE OF NATIVE CORONARY ARTERY WITHOUT ANGINA PECTORIS: ICD-10-CM

## 2019-10-28 DIAGNOSIS — I48.92 UNSPECIFIED ATRIAL FLUTTER: ICD-10-CM

## 2019-10-28 DIAGNOSIS — J90 PLEURAL EFFUSION, NOT ELSEWHERE CLASSIFIED: ICD-10-CM

## 2019-10-28 DIAGNOSIS — T42.8X5A ADVERSE EFFECT OF ANTIPARKINSONISM DRUGS AND OTHER CENTRAL MUSCLE-TONE DEPRESSANTS, INITIAL ENCOUNTER: ICD-10-CM

## 2019-10-28 DIAGNOSIS — I50.43 ACUTE ON CHRONIC COMBINED SYSTOLIC (CONGESTIVE) AND DIASTOLIC (CONGESTIVE) HEART FAILURE: ICD-10-CM

## 2019-10-28 DIAGNOSIS — I48.0 PAROXYSMAL ATRIAL FIBRILLATION: ICD-10-CM

## 2019-10-29 ENCOUNTER — APPOINTMENT (OUTPATIENT)
Dept: CARDIOTHORACIC SURGERY | Facility: CLINIC | Age: 69
End: 2019-10-29

## 2019-10-29 ENCOUNTER — OUTPATIENT (OUTPATIENT)
Dept: OUTPATIENT SERVICES | Facility: HOSPITAL | Age: 69
LOS: 1 days | Discharge: HOME | End: 2019-10-29

## 2019-10-29 VITALS
RESPIRATION RATE: 13 BRPM | BODY MASS INDEX: 22.97 KG/M2 | DIASTOLIC BLOOD PRESSURE: 61 MMHG | SYSTOLIC BLOOD PRESSURE: 93 MMHG | HEART RATE: 78 BPM | HEIGHT: 62.5 IN | OXYGEN SATURATION: 91 % | WEIGHT: 128 LBS

## 2019-10-29 DIAGNOSIS — Z95.1 PRESENCE OF AORTOCORONARY BYPASS GRAFT: ICD-10-CM

## 2019-10-29 DIAGNOSIS — Z98.890 OTHER SPECIFIED POSTPROCEDURAL STATES: Chronic | ICD-10-CM

## 2019-10-30 DIAGNOSIS — N95.2 POSTMENOPAUSAL ATROPHIC VAGINITIS: ICD-10-CM

## 2019-10-30 DIAGNOSIS — R33.9 RETENTION OF URINE, UNSPECIFIED: ICD-10-CM

## 2019-10-30 LAB
ALBUMIN SERPL ELPH-MCNC: 4 G/DL
ALP BLD-CCNC: 116 U/L
ALT SERPL-CCNC: 11 U/L
ANION GAP SERPL CALC-SCNC: 19 MMOL/L
AST SERPL-CCNC: 19 U/L
BASOPHILS # BLD AUTO: 0.01 K/UL
BASOPHILS NFR BLD AUTO: 0.2 %
BILIRUB SERPL-MCNC: 0.3 MG/DL
BUN SERPL-MCNC: 47 MG/DL
CALCIUM SERPL-MCNC: 9.6 MG/DL
CHLORIDE SERPL-SCNC: 98 MMOL/L
CO2 SERPL-SCNC: 27 MMOL/L
CREAT SERPL-MCNC: 1.4 MG/DL
EOSINOPHIL # BLD AUTO: 0.09 K/UL
EOSINOPHIL NFR BLD AUTO: 1.5 %
FERRITIN SERPL-MCNC: 389 NG/ML
GLUCOSE SERPL-MCNC: 103 MG/DL
HCT VFR BLD CALC: 39.7 %
HGB BLD-MCNC: 11.8 G/DL
IMM GRANULOCYTES NFR BLD AUTO: 0.5 %
IRON SATN MFR SERPL: 29 %
IRON SERPL-MCNC: 75 UG/DL
LYMPHOCYTES # BLD AUTO: 0.88 K/UL
LYMPHOCYTES NFR BLD AUTO: 14.8 %
MAN DIFF?: NORMAL
MCHC RBC-ENTMCNC: 27.2 PG
MCHC RBC-ENTMCNC: 29.7 G/DL
MCV RBC AUTO: 91.5 FL
MONOCYTES # BLD AUTO: 0.49 K/UL
MONOCYTES NFR BLD AUTO: 8.2 %
NEUTROPHILS # BLD AUTO: 4.44 K/UL
NEUTROPHILS NFR BLD AUTO: 74.8 %
PLATELET # BLD AUTO: 342 K/UL
POTASSIUM SERPL-SCNC: 5.1 MMOL/L
PROT SERPL-MCNC: 6.5 G/DL
RBC # BLD: 4.34 M/UL
RBC # FLD: 18 %
SODIUM SERPL-SCNC: 144 MMOL/L
TIBC SERPL-MCNC: 255 UG/DL
UIBC SERPL-MCNC: 180 UG/DL
WBC # FLD AUTO: 5.94 K/UL

## 2019-10-31 ENCOUNTER — INPATIENT (INPATIENT)
Facility: HOSPITAL | Age: 69
LOS: 18 days | Discharge: NURSING, REHAB OR LONG-TERM CARE | End: 2019-11-19
Attending: THORACIC SURGERY (CARDIOTHORACIC VASCULAR SURGERY) | Admitting: THORACIC SURGERY (CARDIOTHORACIC VASCULAR SURGERY)
Payer: MEDICARE

## 2019-10-31 VITALS
OXYGEN SATURATION: 90 % | RESPIRATION RATE: 18 BRPM | HEART RATE: 96 BPM | SYSTOLIC BLOOD PRESSURE: 104 MMHG | TEMPERATURE: 98 F | DIASTOLIC BLOOD PRESSURE: 56 MMHG | WEIGHT: 123.02 LBS | HEIGHT: 63 IN

## 2019-10-31 DIAGNOSIS — Z98.890 OTHER SPECIFIED POSTPROCEDURAL STATES: Chronic | ICD-10-CM

## 2019-10-31 LAB
ALBUMIN SERPL ELPH-MCNC: 4.3 G/DL — SIGNIFICANT CHANGE UP (ref 3.5–5.2)
ALP SERPL-CCNC: 122 U/L — HIGH (ref 30–115)
ALT FLD-CCNC: 11 U/L — SIGNIFICANT CHANGE UP (ref 0–41)
ANION GAP SERPL CALC-SCNC: 17 MMOL/L — HIGH (ref 7–14)
AST SERPL-CCNC: 20 U/L — SIGNIFICANT CHANGE UP (ref 0–41)
BILIRUB SERPL-MCNC: 0.4 MG/DL — SIGNIFICANT CHANGE UP (ref 0.2–1.2)
BLD GP AB SCN SERPL QL: SIGNIFICANT CHANGE UP
BUN SERPL-MCNC: 49 MG/DL — HIGH (ref 10–20)
CALCIUM SERPL-MCNC: 9.7 MG/DL — SIGNIFICANT CHANGE UP (ref 8.5–10.1)
CHLORIDE SERPL-SCNC: 95 MMOL/L — LOW (ref 98–110)
CO2 SERPL-SCNC: 27 MMOL/L — SIGNIFICANT CHANGE UP (ref 17–32)
CREAT SERPL-MCNC: 1.7 MG/DL — HIGH (ref 0.7–1.5)
GLUCOSE SERPL-MCNC: 161 MG/DL — HIGH (ref 70–99)
HCT VFR BLD CALC: 33.9 % — LOW (ref 37–47)
HGB BLD-MCNC: 10.4 G/DL — LOW (ref 12–16)
MAGNESIUM SERPL-MCNC: 1.7 MG/DL — LOW (ref 1.8–2.4)
MCHC RBC-ENTMCNC: 27.4 PG — SIGNIFICANT CHANGE UP (ref 27–31)
MCHC RBC-ENTMCNC: 30.7 G/DL — LOW (ref 32–37)
MCV RBC AUTO: 89.2 FL — SIGNIFICANT CHANGE UP (ref 81–99)
NRBC # BLD: 0 /100 WBCS — SIGNIFICANT CHANGE UP (ref 0–0)
NT-PROBNP SERPL-SCNC: HIGH PG/ML (ref 0–300)
PLATELET # BLD AUTO: 192 K/UL — SIGNIFICANT CHANGE UP (ref 130–400)
POTASSIUM SERPL-MCNC: 5.1 MMOL/L — HIGH (ref 3.5–5)
POTASSIUM SERPL-SCNC: 5.1 MMOL/L — HIGH (ref 3.5–5)
PROT SERPL-MCNC: 7.3 G/DL — SIGNIFICANT CHANGE UP (ref 6–8)
RBC # BLD: 3.8 M/UL — LOW (ref 4.2–5.4)
RBC # FLD: 17.9 % — HIGH (ref 11.5–14.5)
SODIUM SERPL-SCNC: 139 MMOL/L — SIGNIFICANT CHANGE UP (ref 135–146)
TROPONIN T SERPL-MCNC: 0.17 NG/ML — CRITICAL HIGH
WBC # BLD: 10.98 K/UL — HIGH (ref 4.8–10.8)
WBC # FLD AUTO: 10.98 K/UL — HIGH (ref 4.8–10.8)

## 2019-10-31 PROCEDURE — 93010 ELECTROCARDIOGRAM REPORT: CPT

## 2019-10-31 PROCEDURE — 36000 PLACE NEEDLE IN VEIN: CPT | Mod: GC

## 2019-10-31 PROCEDURE — 71045 X-RAY EXAM CHEST 1 VIEW: CPT | Mod: 26

## 2019-10-31 PROCEDURE — 99285 EMERGENCY DEPT VISIT HI MDM: CPT

## 2019-10-31 PROCEDURE — 93970 EXTREMITY STUDY: CPT | Mod: 26

## 2019-10-31 RX ORDER — PANTOPRAZOLE SODIUM 20 MG/1
40 TABLET, DELAYED RELEASE ORAL ONCE
Refills: 0 | Status: COMPLETED | OUTPATIENT
Start: 2019-10-31 | End: 2019-10-31

## 2019-10-31 RX ORDER — MORPHINE SULFATE 50 MG/1
2 CAPSULE, EXTENDED RELEASE ORAL
Refills: 0 | Status: DISCONTINUED | OUTPATIENT
Start: 2019-10-31 | End: 2019-11-04

## 2019-10-31 RX ORDER — MAGNESIUM SULFATE 500 MG/ML
2 VIAL (ML) INJECTION ONCE
Refills: 0 | Status: COMPLETED | OUTPATIENT
Start: 2019-10-31 | End: 2019-10-31

## 2019-10-31 RX ORDER — SODIUM CHLORIDE 9 MG/ML
500 INJECTION INTRAMUSCULAR; INTRAVENOUS; SUBCUTANEOUS ONCE
Refills: 0 | Status: COMPLETED | OUTPATIENT
Start: 2019-10-31 | End: 2019-10-31

## 2019-10-31 RX ADMIN — SODIUM CHLORIDE 500 MILLILITER(S): 9 INJECTION INTRAMUSCULAR; INTRAVENOUS; SUBCUTANEOUS at 23:00

## 2019-10-31 RX ADMIN — PANTOPRAZOLE SODIUM 40 MILLIGRAM(S): 20 TABLET, DELAYED RELEASE ORAL at 23:30

## 2019-10-31 RX ADMIN — MORPHINE SULFATE 2 MILLIGRAM(S): 50 CAPSULE, EXTENDED RELEASE ORAL at 21:14

## 2019-10-31 NOTE — ED PROVIDER NOTE - CARE PLAN
Principal Discharge DX:	Pleural effusion  Secondary Diagnosis:	Right leg pain  Secondary Diagnosis:	RIC (acute kidney injury)  Secondary Diagnosis:	S/P CABG (coronary artery bypass graft)

## 2019-10-31 NOTE — ED PROVIDER NOTE - OBJECTIVE STATEMENT
68 yold female to Ed Pmhx CHF, pleural effusion, Afib not on AC, Hx Gi bleed, indwelling montejo catheter due to bladder prolapse,  s/p Cabg 10/21 discharged from hospital currently at home; pt had bilat lower ext swelling being managed with diuretics at home today developed pain to right calf area, pain on ambulation; pt admits to exertional dyspnea; denies n/v/chest pain, neck or back pain;

## 2019-10-31 NOTE — ED PROVIDER NOTE - ATTENDING CONTRIBUTION TO CARE
I personally evaluated the patient. I reviewed the Resident’s or Physician Assistant’s note (as assigned above), and agree with the findings and plan except as documented in my note.    Pt is a 69 y/o female with PMH of CAD s/p CABG 10 days ago ([facility]), CHF, afib not on AC 2/2 GI bleeding, indwelling montejo, presents to ED for right calf pain swelling and pain since this morning, moderate, constant, worse with palpation. + VOGEL over past 2 days. No fever, cough, abd pain, n/v/d. trauma to leg.     Constitutional: Well developed, well nourished. NAD.  Head: Normocephalic, atraumatic.  Eyes: PERRL. EOMI.  ENT: No nasal discharge. Mucous membranes moist.  Neck: Supple. Painless ROM.  Cardiovascular: Normal S1, S2. Regular rate and rhythm. No murmurs, rubs, or gallops.  Pulmonary: Normal respiratory rate and effort. Decreased BS's in left base. No rhonchi.  Abdominal: Soft. Nondistended. Nontender. No rebound, guarding, rigidity.  Extremities. Pelvis stable. + right calf tenderness. 2+ pulses in b/l LE's.  Skin: No rashes, cyanosis.  Neuro: AAOx3. No focal neurological deficits.  Psych: Normal mood. Normal affect.

## 2019-10-31 NOTE — ED ADULT TRIAGE NOTE - CHIEF COMPLAINT QUOTE
She had a quadruple bypass 10 days ago, she was in physical therapy and when she put her leg down it started hurting, she can't even stand on it (right) and her left leg is swollen - daughter

## 2019-10-31 NOTE — ED PROVIDER NOTE - PROGRESS NOTE DETAILS
case d/w intensivist - Dr. Caceres; will evaluate pt after labs, sono/ct resulted; pt currently stable; prelim as per vascular tech: negative for dvt; PODLOG: Pt evaluated by Dr. Caceres. Due to RIC, no CTA tonight but hydrate and place in obs for repeat labs and CTA in am. Possible drainage of recurrent pleural effusion in am. PODLOG: Pt became hypotensive in ED but asymptomatic. No tachycardic. Pt currenlty getting IVF. Afebrile rectally. Stool brown, no blood, Hgb stable. No chest pain or SOB. Will given fluids and albumin as per Dr. Caceres.

## 2019-10-31 NOTE — ED PROVIDER NOTE - PHYSICAL EXAMINATION
Constitutional: Well developed, well nourished. NAD  Head: Normocephalic, atraumatic.  Eyes: PERRL, EOMI.  ENT: No nasal discharge. Mucous membranes dry.  Neck: Supple. Painless ROM.  Cardiovascular: Regular rate and rhythm.   Pulmonary:  basilar rales;   Abdominal: Soft. Nondistended. No rebound, guarding, rigidity.  Extremities. Pelvis stable. +right montejo leg bag; + bilat lower extremity pedal edema +2; + calf tenderness noted to right leg; no redness, warmth or signs of infection; Left leg vein harvest site healing with no infection;   Skin: No rashes, cyanosis.  Neuro: AAOx3. No focal neurological deficits.  Psych: Normal mood. Normal affect.

## 2019-10-31 NOTE — ED PROVIDER NOTE - CLINICAL SUMMARY MEDICAL DECISION MAKING FREE TEXT BOX
69 y/o female s/p CABG presented to ED for leg pain, SOB. Labs, imaging duplex obtained. No evidence of DVT. Pt found to have large pleural effusion. Discussed with CT surgery who states will see pt again in am for possible pleurocentesis. Discussed r/b/a of obtaining CTA chest but due to RIC and explanation of SOB will hold off until am after hydration, repeat kidney function. Pt's BP slightly dropped during ED visit but responded will fluids. Will place in obs for CT surgery protocol. Pt stable on dispo.

## 2019-11-01 ENCOUNTER — APPOINTMENT (OUTPATIENT)
Dept: CARDIOLOGY | Facility: CLINIC | Age: 69
End: 2019-11-01

## 2019-11-01 DIAGNOSIS — Z95.1 PRESENCE OF AORTOCORONARY BYPASS GRAFT: ICD-10-CM

## 2019-11-01 DIAGNOSIS — M79.604 PAIN IN RIGHT LEG: ICD-10-CM

## 2019-11-01 DIAGNOSIS — I50.23 ACUTE ON CHRONIC SYSTOLIC (CONGESTIVE) HEART FAILURE: ICD-10-CM

## 2019-11-01 DIAGNOSIS — N17.9 ACUTE KIDNEY FAILURE, UNSPECIFIED: ICD-10-CM

## 2019-11-01 LAB
ANION GAP SERPL CALC-SCNC: 14 MMOL/L — SIGNIFICANT CHANGE UP (ref 7–14)
ANION GAP SERPL CALC-SCNC: 16 MMOL/L — HIGH (ref 7–14)
APPEARANCE UR: ABNORMAL
APTT BLD: 37.5 SEC — SIGNIFICANT CHANGE UP (ref 27–39.2)
BACTERIA # UR AUTO: ABNORMAL
BILIRUB UR-MCNC: NEGATIVE — SIGNIFICANT CHANGE UP
BUN SERPL-MCNC: 50 MG/DL — HIGH (ref 10–20)
BUN SERPL-MCNC: 53 MG/DL — HIGH (ref 10–20)
CALCIUM SERPL-MCNC: 9.3 MG/DL — SIGNIFICANT CHANGE UP (ref 8.5–10.1)
CALCIUM SERPL-MCNC: 9.6 MG/DL — SIGNIFICANT CHANGE UP (ref 8.5–10.1)
CHLORIDE SERPL-SCNC: 96 MMOL/L — LOW (ref 98–110)
CHLORIDE SERPL-SCNC: 98 MMOL/L — SIGNIFICANT CHANGE UP (ref 98–110)
CO2 SERPL-SCNC: 29 MMOL/L — SIGNIFICANT CHANGE UP (ref 17–32)
CO2 SERPL-SCNC: 30 MMOL/L — SIGNIFICANT CHANGE UP (ref 17–32)
COLOR SPEC: YELLOW — SIGNIFICANT CHANGE UP
CREAT SERPL-MCNC: 1.8 MG/DL — HIGH (ref 0.7–1.5)
CREAT SERPL-MCNC: 1.8 MG/DL — HIGH (ref 0.7–1.5)
DIFF PNL FLD: NEGATIVE — SIGNIFICANT CHANGE UP
EPI CELLS # UR: 1 /HPF — SIGNIFICANT CHANGE UP (ref 0–5)
GLUCOSE SERPL-MCNC: 149 MG/DL — HIGH (ref 70–99)
GLUCOSE SERPL-MCNC: 245 MG/DL — HIGH (ref 70–99)
GLUCOSE UR QL: NEGATIVE — SIGNIFICANT CHANGE UP
HYALINE CASTS # UR AUTO: 12 /LPF — HIGH (ref 0–7)
INR BLD: 1.03 RATIO — SIGNIFICANT CHANGE UP (ref 0.65–1.3)
KETONES UR-MCNC: NEGATIVE — SIGNIFICANT CHANGE UP
LEUKOCYTE ESTERASE UR-ACNC: ABNORMAL
NITRITE UR-MCNC: NEGATIVE — SIGNIFICANT CHANGE UP
PH UR: 7 — SIGNIFICANT CHANGE UP (ref 5–8)
POTASSIUM SERPL-MCNC: 5.1 MMOL/L — HIGH (ref 3.5–5)
POTASSIUM SERPL-MCNC: 5.2 MMOL/L — HIGH (ref 3.5–5)
POTASSIUM SERPL-SCNC: 5.1 MMOL/L — HIGH (ref 3.5–5)
POTASSIUM SERPL-SCNC: 5.2 MMOL/L — HIGH (ref 3.5–5)
PROT UR-MCNC: ABNORMAL
PROTHROM AB SERPL-ACNC: 11.8 SEC — SIGNIFICANT CHANGE UP (ref 9.95–12.87)
RBC CASTS # UR COMP ASSIST: 1 /HPF — SIGNIFICANT CHANGE UP (ref 0–4)
SODIUM SERPL-SCNC: 139 MMOL/L — SIGNIFICANT CHANGE UP (ref 135–146)
SODIUM SERPL-SCNC: 144 MMOL/L — SIGNIFICANT CHANGE UP (ref 135–146)
SP GR SPEC: 1.02 — SIGNIFICANT CHANGE UP (ref 1.01–1.02)
TROPONIN T SERPL-MCNC: 0.16 NG/ML — CRITICAL HIGH
UROBILINOGEN FLD QL: SIGNIFICANT CHANGE UP
WBC UR QL: 26 /HPF — HIGH (ref 0–5)

## 2019-11-01 PROCEDURE — 99218: CPT | Mod: 25

## 2019-11-01 PROCEDURE — 71045 X-RAY EXAM CHEST 1 VIEW: CPT | Mod: 26

## 2019-11-01 PROCEDURE — 32555 ASPIRATE PLEURA W/ IMAGING: CPT | Mod: 79

## 2019-11-01 PROCEDURE — 93306 TTE W/DOPPLER COMPLETE: CPT | Mod: 26

## 2019-11-01 PROCEDURE — 32555 ASPIRATE PLEURA W/ IMAGING: CPT | Mod: 58

## 2019-11-01 RX ORDER — ASPIRIN/CALCIUM CARB/MAGNESIUM 324 MG
81 TABLET ORAL DAILY
Refills: 0 | Status: DISCONTINUED | OUTPATIENT
Start: 2019-11-01 | End: 2019-11-19

## 2019-11-01 RX ORDER — TAMSULOSIN HYDROCHLORIDE 0.4 MG/1
0.4 CAPSULE ORAL AT BEDTIME
Refills: 0 | Status: DISCONTINUED | OUTPATIENT
Start: 2019-11-01 | End: 2019-11-10

## 2019-11-01 RX ORDER — CEFAZOLIN SODIUM 1 G
VIAL (EA) INJECTION
Refills: 0 | Status: DISCONTINUED | OUTPATIENT
Start: 2019-11-01 | End: 2019-11-02

## 2019-11-01 RX ORDER — CARVEDILOL PHOSPHATE 80 MG/1
3.12 CAPSULE, EXTENDED RELEASE ORAL EVERY 12 HOURS
Refills: 0 | Status: DISCONTINUED | OUTPATIENT
Start: 2019-11-01 | End: 2019-11-03

## 2019-11-01 RX ORDER — LOSARTAN POTASSIUM 100 MG/1
12.5 TABLET, FILM COATED ORAL DAILY
Refills: 0 | Status: DISCONTINUED | OUTPATIENT
Start: 2019-11-01 | End: 2019-11-01

## 2019-11-01 RX ORDER — SACUBITRIL AND VALSARTAN 24; 26 MG/1; MG/1
1 TABLET, FILM COATED ORAL
Refills: 0 | Status: DISCONTINUED | OUTPATIENT
Start: 2019-11-01 | End: 2019-11-03

## 2019-11-01 RX ORDER — QUETIAPINE FUMARATE 200 MG/1
25 TABLET, FILM COATED ORAL AT BEDTIME
Refills: 0 | Status: DISCONTINUED | OUTPATIENT
Start: 2019-11-01 | End: 2019-11-03

## 2019-11-01 RX ORDER — PANTOPRAZOLE SODIUM 20 MG/1
40 TABLET, DELAYED RELEASE ORAL
Refills: 0 | Status: DISCONTINUED | OUTPATIENT
Start: 2019-11-01 | End: 2019-11-19

## 2019-11-01 RX ORDER — SODIUM CHLORIDE 9 MG/ML
250 INJECTION INTRAMUSCULAR; INTRAVENOUS; SUBCUTANEOUS ONCE
Refills: 0 | Status: COMPLETED | OUTPATIENT
Start: 2019-11-01 | End: 2019-11-01

## 2019-11-01 RX ORDER — ALBUMIN HUMAN 25 %
500 VIAL (ML) INTRAVENOUS ONCE
Refills: 0 | Status: COMPLETED | OUTPATIENT
Start: 2019-11-01 | End: 2019-11-01

## 2019-11-01 RX ORDER — METFORMIN HYDROCHLORIDE 850 MG/1
1000 TABLET ORAL
Refills: 0 | Status: DISCONTINUED | OUTPATIENT
Start: 2019-11-01 | End: 2019-11-01

## 2019-11-01 RX ORDER — SACUBITRIL AND VALSARTAN 24; 26 MG/1; MG/1
1 TABLET, FILM COATED ORAL
Refills: 0 | Status: DISCONTINUED | OUTPATIENT
Start: 2019-11-01 | End: 2019-11-01

## 2019-11-01 RX ORDER — SODIUM CHLORIDE 9 MG/ML
500 INJECTION INTRAMUSCULAR; INTRAVENOUS; SUBCUTANEOUS
Refills: 0 | Status: DISCONTINUED | OUTPATIENT
Start: 2019-11-01 | End: 2019-11-04

## 2019-11-01 RX ORDER — BUMETANIDE 0.25 MG/ML
1 INJECTION INTRAMUSCULAR; INTRAVENOUS DAILY
Refills: 0 | Status: DISCONTINUED | OUTPATIENT
Start: 2019-11-01 | End: 2019-11-03

## 2019-11-01 RX ORDER — AMIODARONE HYDROCHLORIDE 400 MG/1
200 TABLET ORAL DAILY
Refills: 0 | Status: DISCONTINUED | OUTPATIENT
Start: 2019-11-01 | End: 2019-11-06

## 2019-11-01 RX ORDER — ALPRAZOLAM 0.25 MG
0.25 TABLET ORAL
Refills: 0 | Status: DISCONTINUED | OUTPATIENT
Start: 2019-11-01 | End: 2019-11-03

## 2019-11-01 RX ORDER — CEFAZOLIN SODIUM 1 G
1000 VIAL (EA) INJECTION EVERY 12 HOURS
Refills: 0 | Status: DISCONTINUED | OUTPATIENT
Start: 2019-11-01 | End: 2019-11-02

## 2019-11-01 RX ORDER — TIOTROPIUM BROMIDE 18 UG/1
1 CAPSULE ORAL; RESPIRATORY (INHALATION) DAILY
Refills: 0 | Status: DISCONTINUED | OUTPATIENT
Start: 2019-11-01 | End: 2019-11-19

## 2019-11-01 RX ORDER — CEFAZOLIN SODIUM 1 G
1000 VIAL (EA) INJECTION ONCE
Refills: 0 | Status: COMPLETED | OUTPATIENT
Start: 2019-11-01 | End: 2019-11-01

## 2019-11-01 RX ORDER — ATORVASTATIN CALCIUM 80 MG/1
40 TABLET, FILM COATED ORAL AT BEDTIME
Refills: 0 | Status: DISCONTINUED | OUTPATIENT
Start: 2019-11-01 | End: 2019-11-19

## 2019-11-01 RX ADMIN — SODIUM CHLORIDE 50 MILLILITER(S): 9 INJECTION INTRAMUSCULAR; INTRAVENOUS; SUBCUTANEOUS at 00:01

## 2019-11-01 RX ADMIN — CARVEDILOL PHOSPHATE 3.12 MILLIGRAM(S): 80 CAPSULE, EXTENDED RELEASE ORAL at 18:30

## 2019-11-01 RX ADMIN — MORPHINE SULFATE 2 MILLIGRAM(S): 50 CAPSULE, EXTENDED RELEASE ORAL at 13:43

## 2019-11-01 RX ADMIN — Medication 100 MILLIGRAM(S): at 12:26

## 2019-11-01 RX ADMIN — SODIUM CHLORIDE 500 MILLILITER(S): 9 INJECTION INTRAMUSCULAR; INTRAVENOUS; SUBCUTANEOUS at 16:00

## 2019-11-01 RX ADMIN — BUMETANIDE 1 MILLIGRAM(S): 0.25 INJECTION INTRAMUSCULAR; INTRAVENOUS at 13:39

## 2019-11-01 RX ADMIN — AMIODARONE HYDROCHLORIDE 200 MILLIGRAM(S): 400 TABLET ORAL at 13:39

## 2019-11-01 RX ADMIN — Medication 81 MILLIGRAM(S): at 12:26

## 2019-11-01 RX ADMIN — Medication 100 MILLIGRAM(S): at 18:31

## 2019-11-01 RX ADMIN — Medication 50 GRAM(S): at 01:00

## 2019-11-01 RX ADMIN — PANTOPRAZOLE SODIUM 40 MILLIGRAM(S): 20 TABLET, DELAYED RELEASE ORAL at 13:39

## 2019-11-01 RX ADMIN — MORPHINE SULFATE 2 MILLIGRAM(S): 50 CAPSULE, EXTENDED RELEASE ORAL at 13:57

## 2019-11-01 NOTE — PROGRESS NOTE ADULT - SUBJECTIVE AND OBJECTIVE BOX
OPERATIVE PROCEDURE(s):    CABG with NUBIA clipping on 10/7/19	           HD #1          SURGEON(s): Shawn  SUBJECTIVE ASSESSMENT: 68yFemale patient seen and examined at bedside. Pt c/o right lower leg pain and some SOB. Pt denies CP, fever, chills or Palpitations.     Vital Signs Last 24 Hrs  T(F): 97.7 (2019 09:26), Max: 98.6 (31 Oct 2019 23:39)  HR: 84 (2019 09:) (84 - 96)  BP: 109/55 (2019 09:) (79/46 - 120/58)  RR: 18 (2019 09:) (18 - 18)  SpO2: 96% (2019 09:) (90% - 96%)      CAPILLARY BLOOD GLUCOSE      POCT Blood Glucose.: 130 mg/dL (2019 08:58)      Physical Exam:  General: NAD; A&Ox3  Cardiac: S1/S2, RRR, no murmur, no rubs  Lungs: unlabored respirations, CTA b/l, no wheeze, no rales, no crackles  Abdomen: Soft/NT/ND; positive bowel sounds x 4  Sternum: Intact, no click, incision healing well with no drainage  Right Lower Extremity Incisions: calf is red and painful to touch, leg is warm to touch, no drainage  Extremities: + edema b/l lower extremities left > right, good capillary refill; no cyanosis; palpable 1+ pedal pulses b/l           LABS:                        10.4<L>  10.98<H> )-----------( 192      ( 31 Oct 2019 20:06 )             33.9<L>        144  |  98  |  50<H>  ----------------------------<  149<H>  5.1<H>   |  30  |  1.8<H>  10-31    139  |  95<L>  |  49<H>  ----------------------------<  161<H>  5.1<H>   |  27  |  1.7<H>    Ca    9.6      2019 02:53  Mg     1.7     10-31    TPro  7.3 [6.0 - 8.0]  /  Alb  4.3 [3.5 - 5.2]  /  TBili  0.4 [0.2 - 1.2]  /  DBili  x   /  AST  20 [0 - 41]  /  ALT  11 [0 - 41]  /  AlkPhos  122<H> [30 - 115]  10-31      Urinalysis Basic - ( 2019 09:12 )    Color: Yellow / Appearance: Slightly Turbid / S.017 / pH: x  Gluc: x / Ketone: Negative  / Bili: Negative / Urobili: <2 mg/dL   Blood: x / Protein: 30 mg/dL / Nitrite: Negative   Leuk Esterase: Large / RBC: 1 /HPF / WBC 26 /HPF   Sq Epi: x / Non Sq Epi: 1 /HPF / Bacteria: Many        RADIOLOGY & ADDITIONAL TESTS:  CXR: < from: Xray Chest 1 View AP/PA (10.31.19 @ 20:37) >  Lung parenchyma/Pleura: Bilateral pleural effusions (greater on the right   compared to left). Redemonstration of from bilateral lower lobe   opacities, question atelectasis versus consolidation. No evidence of   pneumothorax.    < end of copied text >    EK Lead ECG:   Ventricular Rate 88 BPM    Atrial Rate 88 BPM    P-R Interval 190 ms    QRS Duration 124 ms    Q-T Interval 386 ms    QTC Calculation(Bezet) 467 ms    P Axis 69 degrees    R Axis 165 degrees    T Axis 42 degrees    Diagnosis Line Normal sinus rhythm  Left posterior fascicular block  ST & T wave abnormality, consider inferolateral ischemia  Abnormal ECG    Confirmed by Jovany Dan ([id number]) on 10/31/2019 11:07:01 PM (10-31-19 @ 21:51)    Allergies    No Known Allergies    Intolerances      MEDICATIONS  (STANDING):  aMIOdarone    Tablet 200 milliGRAM(s) Oral daily  aspirin  chewable 81 milliGRAM(s) Oral daily  atorvastatin 40 milliGRAM(s) Oral at bedtime  buMETAnide 1 milliGRAM(s) Oral daily  carvedilol 3.125 milliGRAM(s) Oral every 12 hours  ceFAZolin   IVPB      losartan 12.5 milliGRAM(s) Oral daily  metFORMIN 1000 milliGRAM(s) Oral two times a day  pantoprazole    Tablet 40 milliGRAM(s) Oral before breakfast  QUEtiapine 25 milliGRAM(s) Oral at bedtime  sodium chloride 0.9%. 500 milliLiter(s) (50 mL/Hr) IV Continuous <Continuous>  tamsulosin Oral Tab/Cap - Peds 0.4 milliGRAM(s) Oral at bedtime  tiotropium 18 MICROgram(s) Capsule 1 Capsule(s) Inhalation daily    MEDICATIONS  (PRN):  ALPRAZolam 0.25 milliGRAM(s) Oral two times a day PRN anxiety  morphine  - Injectable 2 milliGRAM(s) IV Push every 5 minutes PRN Chest Pain      Pharmacologic DVT Prophylaxis: [x] YES, []NO: Contraindication:       GI Prophylaxis: Protonix [x], Pepcid []      Ambulation/Activity Status: ambulate as tolerated    Assessment/Plan:  68y Female status-post CABG on 10/7/19 in observation unit for leg pain. Pt has a large Right pleural effusion and sob.   - Case and plan discussed with CTU Intensivist and CT Surgeon - Dr. Escobar   - Continue CTU supportive care and ongoing plan of care as per continuing CTU rounds.    - Continue DVT/GI prophylaxis  - Continue to advance physical activity as tolerated and continue PT/OT as directed  1. CAD: Continue ASA, statin, BB  2. LE cellulitis- start ancef IV   3. DVT prohylaxis- SQ Heparin   4.Right pleural effusion: thorocenthesis today  5. Heart Failure: resume home medications regimen       Social Service Disposition:  Keep in observation for 24 hrs more.

## 2019-11-01 NOTE — ED CDU PROVIDER INITIAL DAY NOTE - MUSCULOSKELETAL, MLM
Spine appears normal, range of motion is not limited, no muscle or joint tenderness, + b/l lower extremity swelling with ttp over right calf area.

## 2019-11-01 NOTE — ED CDU PROVIDER INITIAL DAY NOTE - OBJECTIVE STATEMENT
67y/o female with pmh of cad, cabg 10 days ago, htn, afib not on ac due to hx of gi bleed, chf, pt. as here c/o leg pain and swelling associated with sob x 1 day. pt. was evaluated in ed and was found to be in bhumika. pt. was placed in obs for iv hydration, reassess creatinine in am and possible cta in the morning if aka improves.

## 2019-11-01 NOTE — ED CDU PROVIDER INITIAL DAY NOTE - PROGRESS NOTE DETAILS
Patient c/o redness and pain to right leg. Currently being followed by CT surgery. Patient no longer on Losartan, order cancelled. Per CT SX KEHINDE Graham, hold Metformin,  and Entresto. She will call pharmacy regarding Bumex CT sx performed thoracocentesis. Per KEHINDE Bethea, keep in obs another night BP  88/51 noted. Dr. Henley spoke with KEHINDE Bethea. Give 250 cc NS bolus received signout from [facility] - pt in obs for sob, right leg pain; pt seen by ct surgery team - had thoracentesis and started on abx for right leg pain possible infection; case d/w Dr. Caceres intensivist - hold entresto if creat > 1.8; pt with difficulty ambulating; ? rehab consult in am prior to possible d/c home;

## 2019-11-01 NOTE — ED CDU PROVIDER INITIAL DAY NOTE - MEDICAL DECISION MAKING DETAILS
69yo woman with HTN, afib not on anticoagulation due to GIB, recent CABG (10 days PTA) was placed in CDU after she presented to the ED c/o R leg pain and swelling with increased SOB x 1 day. ED evaluation revealed mild RIC. 69yo woman with HTN, afib not on anticoagulation due to GIB, recent CABG (10 days PTA) was placed in CDU after she presented to the ED c/o R leg pain and swelling with increased SOB x 1 day. ED evaluation revealed mild RIC and b/l pleural effusions; pt did have an episode of hypotension but she remained hypotensive and responded to fluids and albumin. Plan is for monitoring, possible thoracentesis. Exam as noted with RLE erythema and pain, no DVT but will treat for possible early cellulitis and observe; concerned for pt's ability to walk at home as pain from the leg is relatively severe. CT surgery aware of various issues and will also monitor.

## 2019-11-01 NOTE — CONSULT NOTE ADULT - SUBJECTIVE AND OBJECTIVE BOX
67 y/o F with h/o CAD s/p CABG, ICM, chronic systolic heart failure with EF ~ 25% coming today with rash in RLE and pain. Patient can't bear any weight. The pain started suddenly. She also notes some SOB but this is not that significant compared to her baseline. She can walk minimally around the house before getting SOB. She denies PND or orthopnea but has pedal edema. No LOC, palpitations, bleeding episodes.     Patient has right pleural effusion.     Patient was found to have significant     She reports being compliant with meds.       PSH: CABG    Social: No ETOH or smoking

## 2019-11-01 NOTE — ED ADULT NURSE NOTE - CHPI ED NUR DURATION
Gastroenteritis in Children: Care Instructions  Your Care Instructions    Gastroenteritis is an illness that may cause nausea, vomiting, and diarrhea. It is sometimes called \"stomach flu. \" It can be caused by bacteria or a virus. Your child should begin to feel better in 1 or 2 days. In the meantime, let your child get plenty of rest and make sure he or she does not get dehydrated. Dehydration occurs when the body loses too much fluid. Follow-up care is a key part of your child's treatment and safety. Be sure to make and go to all appointments, and call your doctor if your child is having problems. It's also a good idea to know your child's test results and keep a list of the medicines your child takes. How can you care for your child at home? · Have your child take medicines exactly as prescribed. Call your doctor if you think your child is having a problem with his or her medicine. You will get more details on the specific medicines your doctor prescribes. · Give your child lots of fluids, enough so that the urine is light yellow or clear like water. This is very important if your child is vomiting or has diarrhea. Give your child sips of water or drinks such as Pedialyte or Infalyte. These drinks contain a mix of salt, sugar, and minerals. You can buy them at drugstores or grocery stores. Give these drinks as long as your child is throwing up or has diarrhea. Do not use them as the only source of liquids or food for more than 12 to 24 hours. · Watch for and treat signs of dehydration, which means the body has lost too much water. As your child becomes dehydrated, thirst increases, and his or her mouth or eyes may feel very dry. Your child may also lack energy and want to be held a lot. Your child's urine will be darker, and he or she will not need to urinate as often as usual.  · Wash your hands after changing diapers and before you touch food.  Have your child wash his or her hands after using the toilet and before eating. · After your child goes 6 hours without vomiting, go back to giving him or her a normal, easy-to-digest diet. · Continue to breastfeed, but try it more often and for a shorter time. Give Infalyte or a similar drink between feedings with a dropper, spoon, or bottle. · If your baby is formula-fed, switch to Infalyte. Give:  ¨ 1 tablespoon of the drink every 10 minutes for the first hour. ¨ After the first hour, slowly increase how much Infalyte you offer your baby. ¨ When 6 hours have passed with no vomiting, you may give your child formula again. · Do not give your child over-the-counter antidiarrhea or upset-stomach medicines without talking to your doctor first. Rambo Griffins not give Pepto-Bismol or other medicines that contain salicylates, a form of aspirin. Do not give aspirin to anyone younger than 20. It has been linked to Reye syndrome, a serious illness. · Make sure your child rests. Keep your child home as long as he or she has a fever. When should you call for help? Call 911 anytime you think your child may need emergency care. For example, call if:  ? · Your child passes out (loses consciousness). ? · Your child is confused, does not know where he or she is, or is extremely sleepy or hard to wake up. ? · Your child vomits blood or what looks like coffee grounds. ? · Your child passes maroon or very bloody stools. ?Call your doctor now or seek immediate medical care if:  ? · Your child has severe belly pain. ? · Your child has signs of needing more fluids. These signs include sunken eyes with few tears, a dry mouth with little or no spit, and little or no urine for 6 hours. ? · Your child has a new or higher fever. ? · Your child's stools are black and tarlike or have streaks of blood. ? · Your child has new symptoms, such as a rash, an earache, or a sore throat. ? · Symptoms such as vomiting, diarrhea, and belly pain get worse.    ? · Your child cannot keep down medicine or liquids. ? Watch closely for changes in your child's health, and be sure to contact your doctor if:  ? · Your child is not feeling better within 2 days. Where can you learn more? Go to http://tony-chani.info/. Enter V704 in the search box to learn more about \"Gastroenteritis in Children: Care Instructions. \"  Current as of: March 3, 2017  Content Version: 11.4  © 6178-9496 [employer]. Care instructions adapted under license by [employer] (which disclaims liability or warranty for this information). If you have questions about a medical condition or this instruction, always ask your healthcare professional. [address] any warranty or liability for your use of this information. [employer] Activation    Thank you for requesting access to [employer]. Please follow the instructions below to securely access and download your online medical record. [employer] allows you to send messages to your doctor, view your test results, renew your prescriptions, schedule appointments, and more. How Do I Sign Up? 1. In your internet browser, go to [email or web address]  2. Click on the First Time User? Click Here link in the Sign In box. You will be redirect to the New Member Sign Up page. 3. Enter your [employer] Access Code exactly as it appears below. You will not need to use this code after youve completed the sign-up process. If you do not sign up before the expiration date, you must request a new code. [employer] Access Code: Activation code not generated  Patient is below the minimum allowed age for [employer] access. (This is the date your [employer] access code will )    4. Enter the last four digits of your Social Security Number (xxxx) and Date of Birth (mm/dd/yyyy) as indicated and click Submit. You will be taken to the next sign-up page. 5. Create a [employer] ID.  This will be your [employer] login ID and cannot be changed, so think of one that is secure and easy to remember. 6. Create a [facility] password. You can change your password at any time. 7. Enter your Password Reset Question and Answer. This can be used at a later time if you forget your password. 8. Enter your e-mail address. You will receive e-mail notification when new information is available in [address] 9. Click Sign Up. You can now view and download portions of your medical record. 10. Click the Download Summary menu link to download a portable copy of your medical information. Additional Information    If you have questions, please visit the Frequently Asked Questions section of the [facility] website at [email or web address] Remember, [facility] is NOT to be used for urgent needs. For medical emergencies, dial 911. 4/day(s)

## 2019-11-01 NOTE — CONSULT NOTE ADULT - PROBLEM SELECTOR RECOMMENDATION 9
LVEF on discharge was ~ 25%   Patient started on Entresto in the outpatient which she has tolerated   She was significantly overloaded last week, Bumex increased to 2 mg in am and 1 mg in PM  TTE today showed (my interpretation) smaller LV, improved LV systolic function - LVEF is now 30-35%, anterolateral and inferolateral walls are nato better, MR is only mild to moderate   Continue Entresto 24-26 mg twice daily   Bumex 1 mg daily  Continue carvedilol 3.125 mg twice daily - discharge on 6.25 mg bid    Right pleural effusion thoracentesis by CT surgery today   Strict I/Os  Follow up next Friday in the office  Discussed with CT surgery

## 2019-11-02 LAB
ALBUMIN SERPL ELPH-MCNC: 3.1 G/DL — LOW (ref 3.5–5.2)
ALP SERPL-CCNC: 110 U/L — SIGNIFICANT CHANGE UP (ref 30–115)
ALT FLD-CCNC: 8 U/L — SIGNIFICANT CHANGE UP (ref 0–41)
ANION GAP SERPL CALC-SCNC: 11 MMOL/L — SIGNIFICANT CHANGE UP (ref 7–14)
AST SERPL-CCNC: 21 U/L — SIGNIFICANT CHANGE UP (ref 0–41)
BILIRUB SERPL-MCNC: <0.2 MG/DL — SIGNIFICANT CHANGE UP (ref 0.2–1.2)
BUN SERPL-MCNC: 51 MG/DL — HIGH (ref 10–20)
CALCIUM SERPL-MCNC: 9 MG/DL — SIGNIFICANT CHANGE UP (ref 8.5–10.1)
CHLORIDE SERPL-SCNC: 98 MMOL/L — SIGNIFICANT CHANGE UP (ref 98–110)
CK SERPL-CCNC: 198 U/L — SIGNIFICANT CHANGE UP (ref 0–225)
CO2 SERPL-SCNC: 31 MMOL/L — SIGNIFICANT CHANGE UP (ref 17–32)
CREAT SERPL-MCNC: 1.3 MG/DL — SIGNIFICANT CHANGE UP (ref 0.7–1.5)
GAS PNL BLDA: SIGNIFICANT CHANGE UP
GLUCOSE BLDC GLUCOMTR-MCNC: 158 MG/DL — HIGH (ref 70–99)
GLUCOSE BLDC GLUCOMTR-MCNC: 168 MG/DL — HIGH (ref 70–99)
GLUCOSE SERPL-MCNC: 163 MG/DL — HIGH (ref 70–99)
HCT VFR BLD CALC: 26.2 % — LOW (ref 37–47)
HCT VFR BLD CALC: 31 % — LOW (ref 37–47)
HGB BLD-MCNC: 7.8 G/DL — LOW (ref 12–16)
HGB BLD-MCNC: 9.5 G/DL — LOW (ref 12–16)
LDH SERPL L TO P-CCNC: 226 — SIGNIFICANT CHANGE UP (ref 50–242)
MAGNESIUM SERPL-MCNC: 1.9 MG/DL — SIGNIFICANT CHANGE UP (ref 1.8–2.4)
MCHC RBC-ENTMCNC: 27.4 PG — SIGNIFICANT CHANGE UP (ref 27–31)
MCHC RBC-ENTMCNC: 27.9 PG — SIGNIFICANT CHANGE UP (ref 27–31)
MCHC RBC-ENTMCNC: 29.8 G/DL — LOW (ref 32–37)
MCHC RBC-ENTMCNC: 30.6 G/DL — LOW (ref 32–37)
MCV RBC AUTO: 91.2 FL — SIGNIFICANT CHANGE UP (ref 81–99)
MCV RBC AUTO: 91.9 FL — SIGNIFICANT CHANGE UP (ref 81–99)
MYOGLOBIN SERPL-MCNC: 293 NG/ML — HIGH (ref 0–70)
NRBC # BLD: 0 /100 WBCS — SIGNIFICANT CHANGE UP (ref 0–0)
NRBC # BLD: 0 /100 WBCS — SIGNIFICANT CHANGE UP (ref 0–0)
NT-PROBNP SERPL-SCNC: HIGH PG/ML (ref 0–300)
PLATELET # BLD AUTO: 206 K/UL — SIGNIFICANT CHANGE UP (ref 130–400)
PLATELET # BLD AUTO: 262 K/UL — SIGNIFICANT CHANGE UP (ref 130–400)
POTASSIUM SERPL-MCNC: 5.2 MMOL/L — HIGH (ref 3.5–5)
POTASSIUM SERPL-SCNC: 5.2 MMOL/L — HIGH (ref 3.5–5)
PROT SERPL-MCNC: 5.8 G/DL — LOW (ref 6–8)
RBC # BLD: 2.85 M/UL — LOW (ref 4.2–5.4)
RBC # BLD: 3.4 M/UL — LOW (ref 4.2–5.4)
RBC # FLD: 17.4 % — HIGH (ref 11.5–14.5)
RBC # FLD: 17.5 % — HIGH (ref 11.5–14.5)
SODIUM SERPL-SCNC: 140 MMOL/L — SIGNIFICANT CHANGE UP (ref 135–146)
WBC # BLD: 11.32 K/UL — HIGH (ref 4.8–10.8)
WBC # BLD: 9.99 K/UL — SIGNIFICANT CHANGE UP (ref 4.8–10.8)
WBC # FLD AUTO: 11.32 K/UL — HIGH (ref 4.8–10.8)
WBC # FLD AUTO: 9.99 K/UL — SIGNIFICANT CHANGE UP (ref 4.8–10.8)

## 2019-11-02 PROCEDURE — 71250 CT THORAX DX C-: CPT | Mod: 26

## 2019-11-02 PROCEDURE — 73700 CT LOWER EXTREMITY W/O DYE: CPT | Mod: 26,50

## 2019-11-02 PROCEDURE — 99222 1ST HOSP IP/OBS MODERATE 55: CPT

## 2019-11-02 PROCEDURE — 71045 X-RAY EXAM CHEST 1 VIEW: CPT | Mod: 26,77

## 2019-11-02 PROCEDURE — 99291 CRITICAL CARE FIRST HOUR: CPT

## 2019-11-02 PROCEDURE — 99217: CPT

## 2019-11-02 PROCEDURE — 36556 INSERT NON-TUNNEL CV CATH: CPT | Mod: 58

## 2019-11-02 RX ORDER — CEFEPIME 1 G/1
1000 INJECTION, POWDER, FOR SOLUTION INTRAMUSCULAR; INTRAVENOUS EVERY 12 HOURS
Refills: 0 | Status: DISCONTINUED | OUTPATIENT
Start: 2019-11-02 | End: 2019-11-09

## 2019-11-02 RX ORDER — MORPHINE SULFATE 50 MG/1
4 CAPSULE, EXTENDED RELEASE ORAL ONCE
Refills: 0 | Status: DISCONTINUED | OUTPATIENT
Start: 2019-11-02 | End: 2019-11-02

## 2019-11-02 RX ORDER — DEXTROSE 50 % IN WATER 50 %
25 SYRINGE (ML) INTRAVENOUS ONCE
Refills: 0 | Status: DISCONTINUED | OUTPATIENT
Start: 2019-11-02 | End: 2019-11-04

## 2019-11-02 RX ORDER — VANCOMYCIN HCL 1 G
1000 VIAL (EA) INTRAVENOUS ONCE
Refills: 0 | Status: COMPLETED | OUTPATIENT
Start: 2019-11-02 | End: 2019-11-02

## 2019-11-02 RX ORDER — INSULIN HUMAN 100 [IU]/ML
1 INJECTION, SOLUTION SUBCUTANEOUS
Qty: 100 | Refills: 0 | Status: DISCONTINUED | OUTPATIENT
Start: 2019-11-02 | End: 2019-11-04

## 2019-11-02 RX ORDER — HEPARIN SODIUM 5000 [USP'U]/ML
5000 INJECTION INTRAVENOUS; SUBCUTANEOUS EVERY 8 HOURS
Refills: 0 | Status: DISCONTINUED | OUTPATIENT
Start: 2019-11-02 | End: 2019-11-19

## 2019-11-02 RX ORDER — SODIUM CHLORIDE 9 MG/ML
1000 INJECTION, SOLUTION INTRAVENOUS
Refills: 0 | Status: DISCONTINUED | OUTPATIENT
Start: 2019-11-02 | End: 2019-11-19

## 2019-11-02 RX ORDER — SODIUM CHLORIDE 9 MG/ML
3 INJECTION INTRAMUSCULAR; INTRAVENOUS; SUBCUTANEOUS EVERY 8 HOURS
Refills: 0 | Status: DISCONTINUED | OUTPATIENT
Start: 2019-11-02 | End: 2019-11-19

## 2019-11-02 RX ORDER — GLUCAGON INJECTION, SOLUTION 0.5 MG/.1ML
1 INJECTION, SOLUTION SUBCUTANEOUS ONCE
Refills: 0 | Status: DISCONTINUED | OUTPATIENT
Start: 2019-11-02 | End: 2019-11-04

## 2019-11-02 RX ORDER — VANCOMYCIN HCL 1 G
1000 VIAL (EA) INTRAVENOUS EVERY 12 HOURS
Refills: 0 | Status: DISCONTINUED | OUTPATIENT
Start: 2019-11-02 | End: 2019-11-02

## 2019-11-02 RX ORDER — CEFEPIME 1 G/1
1000 INJECTION, POWDER, FOR SOLUTION INTRAMUSCULAR; INTRAVENOUS EVERY 8 HOURS
Refills: 0 | Status: DISCONTINUED | OUTPATIENT
Start: 2019-11-02 | End: 2019-11-02

## 2019-11-02 RX ORDER — DEXTROSE 50 % IN WATER 50 %
12.5 SYRINGE (ML) INTRAVENOUS ONCE
Refills: 0 | Status: DISCONTINUED | OUTPATIENT
Start: 2019-11-02 | End: 2019-11-04

## 2019-11-02 RX ORDER — INSULIN HUMAN 100 [IU]/ML
4 INJECTION, SOLUTION SUBCUTANEOUS ONCE
Refills: 0 | Status: COMPLETED | OUTPATIENT
Start: 2019-11-02 | End: 2019-11-02

## 2019-11-02 RX ORDER — MILRINONE LACTATE 1 MG/ML
0.38 INJECTION, SOLUTION INTRAVENOUS
Qty: 20 | Refills: 0 | Status: DISCONTINUED | OUTPATIENT
Start: 2019-11-02 | End: 2019-11-03

## 2019-11-02 RX ORDER — DEXTROSE 50 % IN WATER 50 %
15 SYRINGE (ML) INTRAVENOUS ONCE
Refills: 0 | Status: DISCONTINUED | OUTPATIENT
Start: 2019-11-02 | End: 2019-11-04

## 2019-11-02 RX ORDER — GABAPENTIN 400 MG/1
100 CAPSULE ORAL
Refills: 0 | Status: DISCONTINUED | OUTPATIENT
Start: 2019-11-02 | End: 2019-11-03

## 2019-11-02 RX ADMIN — MORPHINE SULFATE 4 MILLIGRAM(S): 50 CAPSULE, EXTENDED RELEASE ORAL at 11:30

## 2019-11-02 RX ADMIN — QUETIAPINE FUMARATE 25 MILLIGRAM(S): 200 TABLET, FILM COATED ORAL at 23:10

## 2019-11-02 RX ADMIN — SACUBITRIL AND VALSARTAN 1 TABLET(S): 24; 26 TABLET, FILM COATED ORAL at 18:16

## 2019-11-02 RX ADMIN — Medication 81 MILLIGRAM(S): at 12:51

## 2019-11-02 RX ADMIN — TAMSULOSIN HYDROCHLORIDE 0.4 MILLIGRAM(S): 0.4 CAPSULE ORAL at 23:09

## 2019-11-02 RX ADMIN — CARVEDILOL PHOSPHATE 3.12 MILLIGRAM(S): 80 CAPSULE, EXTENDED RELEASE ORAL at 18:16

## 2019-11-02 RX ADMIN — CARVEDILOL PHOSPHATE 3.12 MILLIGRAM(S): 80 CAPSULE, EXTENDED RELEASE ORAL at 06:31

## 2019-11-02 RX ADMIN — MORPHINE SULFATE 4 MILLIGRAM(S): 50 CAPSULE, EXTENDED RELEASE ORAL at 10:58

## 2019-11-02 RX ADMIN — AMIODARONE HYDROCHLORIDE 200 MILLIGRAM(S): 400 TABLET ORAL at 07:23

## 2019-11-02 RX ADMIN — INSULIN HUMAN 4 UNIT(S): 100 INJECTION, SOLUTION SUBCUTANEOUS at 12:54

## 2019-11-02 RX ADMIN — Medication 0.25 MILLIGRAM(S): at 15:22

## 2019-11-02 RX ADMIN — PANTOPRAZOLE SODIUM 40 MILLIGRAM(S): 20 TABLET, DELAYED RELEASE ORAL at 06:32

## 2019-11-02 RX ADMIN — SODIUM CHLORIDE 50 MILLILITER(S): 9 INJECTION INTRAMUSCULAR; INTRAVENOUS; SUBCUTANEOUS at 12:52

## 2019-11-02 RX ADMIN — GABAPENTIN 100 MILLIGRAM(S): 400 CAPSULE ORAL at 23:09

## 2019-11-02 RX ADMIN — CEFEPIME 100 MILLIGRAM(S): 1 INJECTION, POWDER, FOR SOLUTION INTRAMUSCULAR; INTRAVENOUS at 23:09

## 2019-11-02 RX ADMIN — Medication 250 MILLIGRAM(S): at 20:00

## 2019-11-02 RX ADMIN — HEPARIN SODIUM 5000 UNIT(S): 5000 INJECTION INTRAVENOUS; SUBCUTANEOUS at 22:39

## 2019-11-02 RX ADMIN — Medication 100 MILLIGRAM(S): at 06:28

## 2019-11-02 NOTE — ED CDU PROVIDER SUBSEQUENT DAY NOTE - HISTORY
pt resting in obs without complaints; repeat cmp with creat 1.8; kerry held; bp stable at 104/62; will continue to monitor; plan rehab consult / social service consult this am;

## 2019-11-02 NOTE — ED ADULT NURSE REASSESSMENT NOTE - GENERAL PATIENT STATE
cooperative/no change observed
no change observed/cooperative/resting/sleeping
cooperative/smiling/interactive

## 2019-11-02 NOTE — PATIENT PROFILE ADULT - NSPROHMDIABETBLDGLCUSUALFT_GEN_A_NUR
38 y/o F presents to ED c/o persistent abd pain.  Pt well appearing.  Elevated blood pressure noted upon arrival.  Afebrile.  Pt advised against repeat CT as it is likely low yield and risks associated with ionized radiation.  Pt given pain medication in ED, offered admission for intractable pain but declines.  Pt will f/u outpt as referred to seek MRE of abd and further evaluation as suggested by her GI.
150

## 2019-11-02 NOTE — ED ADULT NURSE REASSESSMENT NOTE - COMFORT CARE
repositioned
repositioned/warm blanket provided/po fluids offered
wait time explained/plan of care explained/warm blanket provided

## 2019-11-02 NOTE — ED CDU PROVIDER DISPOSITION NOTE - CLINICAL COURSE
Pt placed into observation for right leg pain that occurred one week after CABG. She was found to have cellulitis and was treated with IV ancef. Pts pain was such she was not able to walk. Admitted for IV  antibiotics and continued pain management and for rehab. Final decision to admit per Ct surgery.

## 2019-11-02 NOTE — ED ADULT NURSE REASSESSMENT NOTE - NS ED NURSE REASSESS COMMENT FT1
Pt IV infiltrated, pt RUE swollen, pt had fluids infusing with no c/o discomfort, pt assessed and arm was swollen, IV removed and KEHINDE Sweeney made aware [id number], as per PA next shift provider will come to Pt assessed, A&O x 4, VSS, no c/o pain or discomfort at this time, will continue to monitor. patient, heat pack applied.
Pt assessed A/O times 4 Vs stable remain comfortable denies chest pain, no SOB, no N/V ,no dizziness on the bed SPO@ 98% on 2LNCO2  with labored breathing , assistance provide safety precaution on progress on going nursing observation .
Pt assessed at bedside, VSS, daughter at bedside. Pt c/o RLE pain and redness, MD made aware. Coags and troponin labs drawn and sent to lab. Leg Castellon drained. Pt states is difficult to breathe. MD came and will remove fluid. Safety precautions maintained. Will continue to monitor.
Pt assessed, Thoracentesis performed by MD, pt tolerated well, VSS, comfort measures provided, pt changed and skincare provided.
Pt assessed, alert and oriented, pt SOB at times, hypotensive, MD made aware. Medications and labs drawn. Pt transferred to hospital bed due to pressure sore. Will continue to monitor
Pt reassessed report filling less pain on the  right leg after Morphine 4 mg IVP order is given , comfort provide assistance with ADL HOB 35 degree safety precaution on progress on 2LNCO2 .SPO2 - 97% , BP -92/55 ED attending aware , C/O dyspnea on exertion on going nursing observation .
Pt returned from echo, has been at exam since 7a, pt assessed, md made aware 2nd trop wasn't ordered overnight, blood drawn and sent to lab.  Pt denies any chest pain at this time.  Will continue to monitor.
pt hypotensive, md made aware [id number].  Pt asymptomatic at this time.
Pt with periods of wakefulness. Turned position of comfort,  no acute distress but with SOB noted on exertion. Maintained on oxygen @ 2LPM. Continue to monitor.
Received pt alert, oriented x3, denies any pain, no complaints of shortness of breath at this time. Will monitor.

## 2019-11-02 NOTE — ED CDU PROVIDER SUBSEQUENT DAY NOTE - ATTENDING CONTRIBUTION TO CARE
Pt presents with right leg pain. hx of CABG one week ago. Veins harvested from the right lower ext. Pt found to have cellulitis of the right lower ext. Pt states she is feeling better. On exam mild erythema and + tenderness bilateral tenderness posterior medial. Pt states she was unable to  walk.  This am pt is awaiting rehab and social work.

## 2019-11-02 NOTE — PATIENT PROFILE ADULT - HEALTH LITERACY
I will START or STAY ON the medications listed below when I get home from the hospital:    finasteride 5 mg oral tablet  -- 1 tab(s) by mouth once a day  -- Indication: For BPH    aspirin 81 mg oral tablet, chewable  -- 1 tab(s) by mouth once a day  -- Indication: For Ascvd prevention    atorvastatin 20 mg oral tablet  -- 1 tab(s) by mouth once a day (at bedtime)  -- Indication: For Hld    metoprolol  -- 12.5 milligram(s) by mouth 2 times a day  -- Indication: For HTN    pyridostigmine 60 mg oral tablet  -- 0.5 tab(s) by mouth once a day  -- Indication: For autonomic dysfunction    Colace 100 mg oral capsule  -- 1 cap(s) by mouth once a day (at bedtime)  -- Indication: For constipation    midodrine 5 mg oral tablet  -- 1 tab(s) by mouth 3 times a day  -- Indication: For autonomic dysfunction    Lubricant Eye Drops ophthalmic solution  -- 1 drop(s) to each affected eye every 4 hours  -- Indication: For ophthalmic solution    omeprazole 40 mg oral delayed release capsule  -- 1 cap(s) by mouth once a day  -- Indication: For PPI    levothyroxine 25 mcg (0.025 mg) oral tablet  -- 1 tab(s) by mouth once a day  -- Indication: For Hypothyroid    ergocalciferol 50,000 intl units (1.25 mg) oral capsule  -- 1 cap(s) by mouth once a week  -- Indication: For Diabetes I will START or STAY ON the medications listed below when I get home from the hospital:    finasteride 5 mg oral tablet  -- 1 tab(s) by mouth once a day  -- Indication: For BPH    aspirin 81 mg oral tablet, chewable  -- 1 tab(s) by mouth once a day  -- Indication: For Ascvd prevention    atorvastatin 20 mg oral tablet  -- 1 tab(s) by mouth once a day (at bedtime)  -- Indication: For Hld    metoprolol  -- 12.5 milligram(s) by mouth 2 times a day  -- Indication: For HTN    pyridostigmine 60 mg oral tablet  -- 0.5 tab(s) by mouth once a day  -- Indication: For autonomic dysfunction    Colace 100 mg oral capsule  -- 1 cap(s) by mouth once a day (at bedtime)  -- Indication: For constipation    senna oral tablet  -- 2 tab(s) by mouth once a day (at bedtime)  -- Indication: For Constipation    midodrine 10 mg oral tablet  -- 1 tab(s) by mouth 3 times a day  -- Indication: For Hypotension    Lubricant Eye Drops ophthalmic solution  -- 1 drop(s) to each affected eye every 4 hours  -- Indication: For ophthalmic solution    omeprazole 40 mg oral delayed release capsule  -- 1 cap(s) by mouth once a day  -- Indication: For PPI    levothyroxine 25 mcg (0.025 mg) oral tablet  -- 1 tab(s) by mouth once a day  -- Indication: For Hypothyroid    ergocalciferol 50,000 intl units (1.25 mg) oral capsule  -- 1 cap(s) by mouth once a week  -- Indication: For Diabetes no

## 2019-11-02 NOTE — PROGRESS NOTE ADULT - ASSESSMENT
PROBLEMS:  I spent 45 minutes of critical care time examining patient, reviewing vitals, labs, medications, imaging and discussing with the team goals of care to prevent life-threatening in this patient who is at high risk for deterioration or death due to:    1.	Acute on Chronic systolic CHF with low EF - continue mild diuresis, add milrinone at 0.375 mcg/kg/min to help with acute episode, continue home medications  2.	fluid overload - BiPAP, continue diuresis IV fluids stopped  3.	hyperkalemia - monitor bmp - if increasing we will stop entresto  4.	leg pain - possible cellulitis - vanco and cefepine  5.	RIC - monitor bmp  6.	enlarge gall bladder - get abd sono in AM ( patient asymptomatic )  7.	DM - high glucose - on insulin drip  8.	DVT / PUD prophylaxis     PLAN  Neuro: move all 4 extremities. no sensory or motor deficits  Pain management.   ALPRAZolam 0.25 milliGRAM(s) Oral two times a day PRN  gabapentin 100 milliGRAM(s) Oral two times a day  morphine  - Injectable 2 milliGRAM(s) IV Push every 5 minutes PRN  QUEtiapine 25 milliGRAM(s) Oral at bedtime    Pulm: Wean off supplemental oxygen as able. Daily CXR. Encourage coughing, deep breathing and use of incentive spirometry.   tiotropium 18 MICROgram(s) Capsule 1 Capsule(s) Inhalation daily    Cardio: Monitor telemetry/alarms. Continue supportive care   aMIOdarone    Tablet 200 milliGRAM(s) Oral daily  buMETAnide 1 milliGRAM(s) Oral daily  carvedilol 3.125 milliGRAM(s) Oral every 12 hours  milrinone Infusion 0.375 MICROgram(s)/kG/Min IV Continuous <Continuous>  sacubitril 24 mG/valsartan 26 mG 1 Tablet(s) Oral two times a day  tamsulosin Oral Tab/Cap - Peds 0.4 milliGRAM(s) Oral at bedtime    GI: Continue stool softeners.    pantoprazole    Tablet 40 milliGRAM(s) Oral before breakfast    Nutrition: Continue present diet  Endocrine and glucose control:   atorvastatin 40 milliGRAM(s) Oral at bedtime  dextrose 40% Gel 15 Gram(s) Oral once PRN  dextrose 50% Injectable 12.5 Gram(s) IV Push once  dextrose 50% Injectable 25 Gram(s) IV Push once  dextrose 50% Injectable 25 Gram(s) IV Push once  glucagon  Injectable 1 milliGRAM(s) IntraMuscular once PRN  insulin regular Infusion 1 Unit(s)/Hr IV Continuous <Continuous>    Renal: monitor urine output, supplement electrolytes as needed,     Vasc: Heparin SC and/or SCDs for DVT prophylaxis  aspirin  chewable 81 milliGRAM(s) Oral daily  heparin  Injectable 5000 Unit(s) SubCutaneous every 8 hours    ID: Stable, no fever , no chills.   cefepime   IVPB 1000 milliGRAM(s) IV Intermittent every 12 hours  vancomycin  IVPB 1000 milliGRAM(s) IV Intermittent once    Therapy: OOB/ambulate  Disposition: start planing discharge home or placement    Pertinent clinical, laboratory, radiographic, hemodynamic, echocardiographic, respiratory data, microbiologic data and chart were reviewed and analyzed frequently throughout the course of the day and night. GI and DVT prophylaxis, glycemic control, head of bed elevation and skin care issues were addressed.  Patient seen, examined and plan discussed with CT Surgery / CTICU team during rounds.    [x ] The patient remains in critical and unstable condition, and requires ICU care and monitoring  [ ] The patient is improving but requires continued monitoring and adjustment of therapy

## 2019-11-02 NOTE — PROGRESS NOTE ADULT - SUBJECTIVE AND OBJECTIVE BOX
CTU Attending Progress Daily Note     2019 22:44  Admited 19, Hospital Day       HPI: [place] onset of right calf pain and increasing SOB over past two days prior to arrival to ED. Now her SOB got rapidly worst, not able to lay down, O2 Sats with out O2 in low 80' %    Home Medications:  acetaminophen 325 mg oral tablet: 2 tab(s) orally every 6 hours, As needed, Mild Pain (1 - 3) (18 Oct 2019 14:37)    FAMILY HISTORY:  FH: myocardial infarction  FH: stomach cancer    PAST MEDICAL & SURGICAL HISTORY:  Female bladder prolapse  Diabetes  History of repair of hip fracture    Interval event for past 24 hr:  GUS WILBURN  68y had thoracocentesis yesterday with improvement of her symptoms but short lasting .   Current Complains:  GUS WILBURN has continuous pian in her right leg with profound and worsening SOB with orhopnea  Allergies    No Known Allergies    Intolerances      OBJECTIVE:  Vitals last 24 hrs  T(C): 36.3 (19 @ 21:15), Max: 37.3 (19 @ 15:09)  T(F): 97.3 (19 @ 21:15), Max: 99.1 (19 @ 15:09)  HR: 103 (19 @ 22:00) (86 - 103)  BP: 112/55 (19 @ 22:00) (92/55 - 121/56)  ABP: --  ABP(mean): --  RR: 18 (19 @ 22:00) (17 - 25)  SpO2: 99% (19 @ 22:00) (97% - 100%)      19 @ 07:01  -  19 @ 07:00  --------------------------------------------------------  IN:  Total IN: 0 mL    OUT:    Indwelling Catheter - Urethral: 200 mL  Total OUT: 200 mL    Total NET: -200 mL          CAPILLARY BLOOD GLUCOSE      POCT Blood Glucose.: 158 mg/dL (2019 17:43)  POCT Blood Glucose.: 256 mg/dL (2019 12:04)  POCT Blood Glucose.: 248 mg/dL (2019 08:52)    LABS:  ABG - ( 2019 21:43 )  pH, Arterial: 7.39  pH, Blood: x     /  pCO2: 55    /  pO2: 95    / HCO3: 33    / Base Excess: 7.0   /  SaO2: 99              Blood Gas Arterial, Lactate: 0.9 mmoL/L (19 @ 21:43)                          9.5    9.99  )-----------( 206      ( 2019 19:28 )             31.0     Hemoglobin: 9.5 g/dL ( @ 19:28)  Hemoglobin: 7.8 g/dL ( @ 18:02)  Hemoglobin: 10.4 g/dL (10-31 @ 20:06)        140  |  98  |  51<H>  ----------------------------<  163<H>  5.2<H>   |  31  |  1.3    Ca    9.0      2019 18:02  Mg     1.9         TPro  5.8<L>  /  Alb  3.1<L>  /  TBili  <0.2  /  DBili  x   /  AST  21  /  ALT  8   /  AlkPhos  110      Creatinine, Serum: 1.3 mg/dL ( @ 18:02)  Creatinine, Serum: 1.8 mg/dL ( @ 18:54)  Creatinine, Serum: 1.8 mg/dL ( @ 02:53)  Creatinine, Serum: 1.7 mg/dL (10-31 @ 20:06)    PT/INR - ( 2019 10:40 )   PT: 11.80 sec;   INR: 1.03 ratio     PTT - ( 2019 10:40 )  PTT:37.5 sec    Urinalysis Basic - ( 2019 09:12 )    Color: Yellow / Appearance: Slightly Turbid / S.017 / pH: x  Gluc: x / Ketone: Negative  / Bili: Negative / Urobili: <2 mg/dL   Blood: x / Protein: 30 mg/dL / Nitrite: Negative   Leuk Esterase: Large / RBC: 1 /HPF / WBC 26 /HPF   Sq Epi: x / Non Sq Epi: 1 /HPF / Bacteria: Many        HOSPITAL MEDICATIONS:  MEDICATIONS  (STANDING):  aMIOdarone    Tablet 200 milliGRAM(s) Oral daily  aspirin  chewable 81 milliGRAM(s) Oral daily  atorvastatin 40 milliGRAM(s) Oral at bedtime  buMETAnide 1 milliGRAM(s) Oral daily  carvedilol 3.125 milliGRAM(s) Oral every 12 hours  cefepime   IVPB 1000 milliGRAM(s) IV Intermittent every 12 hours  dextrose 5%. 1000 milliLiter(s) (50 mL/Hr) IV Continuous <Continuous>  dextrose 50% Injectable 12.5 Gram(s) IV Push once  dextrose 50% Injectable 25 Gram(s) IV Push once  dextrose 50% Injectable 25 Gram(s) IV Push once  gabapentin 100 milliGRAM(s) Oral two times a day  heparin  Injectable 5000 Unit(s) SubCutaneous every 8 hours  insulin regular Infusion 1 Unit(s)/Hr (1 mL/Hr) IV Continuous <Continuous>  milrinone Infusion 0.375 MICROgram(s)/kG/Min (6.277 mL/Hr) IV Continuous <Continuous>  pantoprazole    Tablet 40 milliGRAM(s) Oral before breakfast  QUEtiapine 25 milliGRAM(s) Oral at bedtime  sacubitril 24 mG/valsartan 26 mG 1 Tablet(s) Oral two times a day  sodium chloride 0.9% lock flush 3 milliLiter(s) IV Push every 8 hours  sodium chloride 0.9%. 500 milliLiter(s) (50 mL/Hr) IV Continuous <Continuous>  tamsulosin Oral Tab/Cap - Peds 0.4 milliGRAM(s) Oral at bedtime  tiotropium 18 MICROgram(s) Capsule 1 Capsule(s) Inhalation daily  vancomycin  IVPB 1000 milliGRAM(s) IV Intermittent once    MEDICATIONS  (PRN):  ALPRAZolam 0.25 milliGRAM(s) Oral two times a day PRN anxiety  dextrose 40% Gel 15 Gram(s) Oral once PRN Blood Glucose LESS THAN 70 milliGRAM(s)/deciliter  glucagon  Injectable 1 milliGRAM(s) IntraMuscular once PRN Glucose LESS THAN 70 milligrams/deciliter  morphine  - Injectable 2 milliGRAM(s) IV Push every 5 minutes PRN Chest Pain      REVIEW OF SYSTEMS:  CONSTITUTIONAL: [X] all negative; [ ] weakness, [ ] fevers, [ ] chills  EYES/ENT: [X] all negative; [ ] visual changes, [ ] vertigo, [ ] throat pain, [ ] eye pain  NECK: [X] all negative; [ ] pain, [ ] stiffness  RESPIRATORY: [ ] all negative, [ ] cough, [ ] wheezing, [ ] hemoptysis, [x ] shortness of breath  CARDIOVASCULAR: [ ] all negative; [ ] chest pain, [ ] palpitations, [x ] orthopnea  GASTROINTESTINAL: [X] all negative; [ ]abdominal pain, [ ] nausea, [ ] vomiting, [ ] hematemesis, [ ] diarrhea, [ ] constipation, [ ] melena, [ ] hematochezia.  GENITOURINARY: [X] all negative; [ ] dysuria, [ ] frequency, [ ] hematuria  NEUROLOGICAL: [X] all negative; [ ] numbness, [ ] weakness, [ ] paresthesias  MUSCULOSKELETAL: [X] all negative, [ ] joint pain, [ ] joint swelling, [ ] joint redness, [ ] bone pain  SKIN: [X] all negative; [ ] itching, [ ] burning, [ ] rashes, [ ] lesions   All other review of systems is negative unless indicated above.    [  ] Unable to assess ROS because     PHYSICAL EXAM:          CONSTITUTIONAL: Well-developed; well-nourished; in no acute distress.   	SKIN: warm, dry, no rashes or lesions  	HEENT: Atraumatic. Normocephalic. PERRL. Moist membranes, no conj injection, sclera clear  	NECK: Supple; non tender.  +JVD. No lymphadenopathy.  	CARD: Normal S1, S2. Rate and Rhythm are regular. No murmurs.  	RESP: decreased BS on right side1/3 way up, no wheezes, +rales no rhonchi.  	ABD: Soft, not tender, not distended, no CVA ttp no rebound no guarding, bowel sounds present  	EXT: Normal ROM.  No clubbing, no cyanosis, +pedal edema, right calf pain b/l, Peripheral pulses intact.  	LYMPH: No acute cervical adenopathy.  	NEURO: Alert, awake, motor 5/5 R, 5/5 L, sensation intact bilat, CN 2-12 intact,          PSYCH: Cooperative, appropriate. Alert & oriented x 3    RADIOLOGY:  X Reviewed and interpreted by me  CxR from 11-02-19 shows moderate congestion, no pneumothorax, large right effusion, no cardiomegaly,       ECG:  X Reviewed and interpreted by me - NSR, QTc - 467, ST -T changes in inferior and lateral walls

## 2019-11-02 NOTE — ED CDU PROVIDER SUBSEQUENT DAY NOTE - PROGRESS NOTE DETAILS
patient signed out from mike lieberman, no complaint will continue to monitor spoke to mike liz ct team , patient evaluated states admit to  tele under dr. roldan for further evaluation

## 2019-11-03 LAB
ALBUMIN SERPL ELPH-MCNC: 2.8 G/DL — LOW (ref 3.5–5.2)
ALBUMIN SERPL ELPH-MCNC: 3.1 G/DL — LOW (ref 3.5–5.2)
ALP SERPL-CCNC: 134 U/L — HIGH (ref 30–115)
ALP SERPL-CCNC: 141 U/L — HIGH (ref 30–115)
ALT FLD-CCNC: 7 U/L — SIGNIFICANT CHANGE UP (ref 0–41)
ALT FLD-CCNC: 9 U/L — SIGNIFICANT CHANGE UP (ref 0–41)
ANION GAP SERPL CALC-SCNC: 11 MMOL/L — SIGNIFICANT CHANGE UP (ref 7–14)
ANION GAP SERPL CALC-SCNC: 8 MMOL/L — SIGNIFICANT CHANGE UP (ref 7–14)
APPEARANCE UR: CLEAR — SIGNIFICANT CHANGE UP
AST SERPL-CCNC: 20 U/L — SIGNIFICANT CHANGE UP (ref 0–41)
AST SERPL-CCNC: 24 U/L — SIGNIFICANT CHANGE UP (ref 0–41)
BACTERIA # UR AUTO: NEGATIVE — SIGNIFICANT CHANGE UP
BILIRUB SERPL-MCNC: <0.2 MG/DL — SIGNIFICANT CHANGE UP (ref 0.2–1.2)
BILIRUB SERPL-MCNC: <0.2 MG/DL — SIGNIFICANT CHANGE UP (ref 0.2–1.2)
BILIRUB UR-MCNC: NEGATIVE — SIGNIFICANT CHANGE UP
BUN SERPL-MCNC: 47 MG/DL — HIGH (ref 10–20)
BUN SERPL-MCNC: 47 MG/DL — HIGH (ref 10–20)
CALCIUM SERPL-MCNC: 8.8 MG/DL — SIGNIFICANT CHANGE UP (ref 8.5–10.1)
CALCIUM SERPL-MCNC: 8.9 MG/DL — SIGNIFICANT CHANGE UP (ref 8.5–10.1)
CHLORIDE SERPL-SCNC: 95 MMOL/L — LOW (ref 98–110)
CHLORIDE SERPL-SCNC: 99 MMOL/L — SIGNIFICANT CHANGE UP (ref 98–110)
CO2 SERPL-SCNC: 32 MMOL/L — SIGNIFICANT CHANGE UP (ref 17–32)
CO2 SERPL-SCNC: 32 MMOL/L — SIGNIFICANT CHANGE UP (ref 17–32)
COLOR SPEC: SIGNIFICANT CHANGE UP
CREAT SERPL-MCNC: 1.1 MG/DL — SIGNIFICANT CHANGE UP (ref 0.7–1.5)
CREAT SERPL-MCNC: 1.2 MG/DL — SIGNIFICANT CHANGE UP (ref 0.7–1.5)
DIFF PNL FLD: NEGATIVE — SIGNIFICANT CHANGE UP
EPI CELLS # UR: 3 /HPF — SIGNIFICANT CHANGE UP (ref 0–5)
GLUCOSE BLDC GLUCOMTR-MCNC: 106 MG/DL — HIGH (ref 70–99)
GLUCOSE BLDC GLUCOMTR-MCNC: 130 MG/DL — HIGH (ref 70–99)
GLUCOSE BLDC GLUCOMTR-MCNC: 137 MG/DL — HIGH (ref 70–99)
GLUCOSE BLDC GLUCOMTR-MCNC: 150 MG/DL — HIGH (ref 70–99)
GLUCOSE BLDC GLUCOMTR-MCNC: 168 MG/DL — HIGH (ref 70–99)
GLUCOSE BLDC GLUCOMTR-MCNC: 223 MG/DL — HIGH (ref 70–99)
GLUCOSE BLDC GLUCOMTR-MCNC: 293 MG/DL — HIGH (ref 70–99)
GLUCOSE BLDC GLUCOMTR-MCNC: 322 MG/DL — HIGH (ref 70–99)
GLUCOSE BLDC GLUCOMTR-MCNC: 68 MG/DL — LOW (ref 70–99)
GLUCOSE BLDC GLUCOMTR-MCNC: 97 MG/DL — SIGNIFICANT CHANGE UP (ref 70–99)
GLUCOSE SERPL-MCNC: 287 MG/DL — HIGH (ref 70–99)
GLUCOSE SERPL-MCNC: 59 MG/DL — LOW (ref 70–99)
GLUCOSE UR QL: NEGATIVE — SIGNIFICANT CHANGE UP
HCT VFR BLD CALC: 27.1 % — LOW (ref 37–47)
HCT VFR BLD CALC: 27.5 % — LOW (ref 37–47)
HGB BLD-MCNC: 8.2 G/DL — LOW (ref 12–16)
HGB BLD-MCNC: 8.3 G/DL — LOW (ref 12–16)
HYALINE CASTS # UR AUTO: 2 /LPF — SIGNIFICANT CHANGE UP (ref 0–7)
KETONES UR-MCNC: NEGATIVE — SIGNIFICANT CHANGE UP
LEUKOCYTE ESTERASE UR-ACNC: ABNORMAL
MAGNESIUM SERPL-MCNC: 1.7 MG/DL — LOW (ref 1.8–2.4)
MCHC RBC-ENTMCNC: 27.1 PG — SIGNIFICANT CHANGE UP (ref 27–31)
MCHC RBC-ENTMCNC: 27.7 PG — SIGNIFICANT CHANGE UP (ref 27–31)
MCHC RBC-ENTMCNC: 29.8 G/DL — LOW (ref 32–37)
MCHC RBC-ENTMCNC: 30.6 G/DL — LOW (ref 32–37)
MCV RBC AUTO: 90.3 FL — SIGNIFICANT CHANGE UP (ref 81–99)
MCV RBC AUTO: 90.8 FL — SIGNIFICANT CHANGE UP (ref 81–99)
NITRITE UR-MCNC: NEGATIVE — SIGNIFICANT CHANGE UP
NRBC # BLD: 0 /100 WBCS — SIGNIFICANT CHANGE UP (ref 0–0)
NRBC # BLD: 0 /100 WBCS — SIGNIFICANT CHANGE UP (ref 0–0)
PH UR: 6.5 — SIGNIFICANT CHANGE UP (ref 5–8)
PLATELET # BLD AUTO: 179 K/UL — SIGNIFICANT CHANGE UP (ref 130–400)
PLATELET # BLD AUTO: 200 K/UL — SIGNIFICANT CHANGE UP (ref 130–400)
POTASSIUM SERPL-MCNC: 4.9 MMOL/L — SIGNIFICANT CHANGE UP (ref 3.5–5)
POTASSIUM SERPL-MCNC: 5 MMOL/L — SIGNIFICANT CHANGE UP (ref 3.5–5)
POTASSIUM SERPL-SCNC: 4.9 MMOL/L — SIGNIFICANT CHANGE UP (ref 3.5–5)
POTASSIUM SERPL-SCNC: 5 MMOL/L — SIGNIFICANT CHANGE UP (ref 3.5–5)
PROT SERPL-MCNC: 5.3 G/DL — LOW (ref 6–8)
PROT SERPL-MCNC: 5.6 G/DL — LOW (ref 6–8)
PROT UR-MCNC: ABNORMAL
RBC # BLD: 3 M/UL — LOW (ref 4.2–5.4)
RBC # BLD: 3.03 M/UL — LOW (ref 4.2–5.4)
RBC # FLD: 16.7 % — HIGH (ref 11.5–14.5)
RBC # FLD: 17.2 % — HIGH (ref 11.5–14.5)
RBC CASTS # UR COMP ASSIST: 14 /HPF — HIGH (ref 0–4)
SODIUM SERPL-SCNC: 135 MMOL/L — SIGNIFICANT CHANGE UP (ref 135–146)
SODIUM SERPL-SCNC: 142 MMOL/L — SIGNIFICANT CHANGE UP (ref 135–146)
SP GR SPEC: 1.01 — SIGNIFICANT CHANGE UP (ref 1.01–1.02)
UROBILINOGEN FLD QL: SIGNIFICANT CHANGE UP
WBC # BLD: 7.25 K/UL — SIGNIFICANT CHANGE UP (ref 4.8–10.8)
WBC # BLD: 7.9 K/UL — SIGNIFICANT CHANGE UP (ref 4.8–10.8)
WBC # FLD AUTO: 7.25 K/UL — SIGNIFICANT CHANGE UP (ref 4.8–10.8)
WBC # FLD AUTO: 7.9 K/UL — SIGNIFICANT CHANGE UP (ref 4.8–10.8)
WBC UR QL: 7 /HPF — HIGH (ref 0–5)

## 2019-11-03 PROCEDURE — 71045 X-RAY EXAM CHEST 1 VIEW: CPT | Mod: 26

## 2019-11-03 PROCEDURE — 76705 ECHO EXAM OF ABDOMEN: CPT | Mod: 26

## 2019-11-03 RX ORDER — SENNA PLUS 8.6 MG/1
1 TABLET ORAL EVERY 12 HOURS
Refills: 0 | Status: DISCONTINUED | OUTPATIENT
Start: 2019-11-03 | End: 2019-11-13

## 2019-11-03 RX ORDER — VASOPRESSIN 20 [USP'U]/ML
0.03 INJECTION INTRAVENOUS
Qty: 50 | Refills: 0 | Status: DISCONTINUED | OUTPATIENT
Start: 2019-11-03 | End: 2019-11-04

## 2019-11-03 RX ORDER — POLYETHYLENE GLYCOL 3350 17 G/17G
17 POWDER, FOR SOLUTION ORAL EVERY 24 HOURS
Refills: 0 | Status: DISCONTINUED | OUTPATIENT
Start: 2019-11-03 | End: 2019-11-13

## 2019-11-03 RX ORDER — BUMETANIDE 0.25 MG/ML
1 INJECTION INTRAMUSCULAR; INTRAVENOUS DAILY
Refills: 0 | Status: DISCONTINUED | OUTPATIENT
Start: 2019-11-03 | End: 2019-11-04

## 2019-11-03 RX ORDER — VANCOMYCIN HCL 1 G
VIAL (EA) INTRAVENOUS
Refills: 0 | Status: DISCONTINUED | OUTPATIENT
Start: 2019-11-03 | End: 2019-11-06

## 2019-11-03 RX ORDER — BUMETANIDE 0.25 MG/ML
0.5 INJECTION INTRAMUSCULAR; INTRAVENOUS ONCE
Refills: 0 | Status: COMPLETED | OUTPATIENT
Start: 2019-11-03 | End: 2019-11-03

## 2019-11-03 RX ORDER — VANCOMYCIN HCL 1 G
1000 VIAL (EA) INTRAVENOUS EVERY 12 HOURS
Refills: 0 | Status: DISCONTINUED | OUTPATIENT
Start: 2019-11-03 | End: 2019-11-06

## 2019-11-03 RX ORDER — VANCOMYCIN HCL 1 G
1000 VIAL (EA) INTRAVENOUS ONCE
Refills: 0 | Status: COMPLETED | OUTPATIENT
Start: 2019-11-03 | End: 2019-11-03

## 2019-11-03 RX ORDER — MAGNESIUM SULFATE 500 MG/ML
2 VIAL (ML) INJECTION ONCE
Refills: 0 | Status: COMPLETED | OUTPATIENT
Start: 2019-11-03 | End: 2019-11-03

## 2019-11-03 RX ADMIN — BUMETANIDE 0.5 MILLIGRAM(S): 0.25 INJECTION INTRAMUSCULAR; INTRAVENOUS at 01:00

## 2019-11-03 RX ADMIN — TAMSULOSIN HYDROCHLORIDE 0.4 MILLIGRAM(S): 0.4 CAPSULE ORAL at 21:09

## 2019-11-03 RX ADMIN — CEFEPIME 100 MILLIGRAM(S): 1 INJECTION, POWDER, FOR SOLUTION INTRAMUSCULAR; INTRAVENOUS at 05:45

## 2019-11-03 RX ADMIN — HEPARIN SODIUM 5000 UNIT(S): 5000 INJECTION INTRAVENOUS; SUBCUTANEOUS at 17:29

## 2019-11-03 RX ADMIN — SACUBITRIL AND VALSARTAN 1 TABLET(S): 24; 26 TABLET, FILM COATED ORAL at 05:48

## 2019-11-03 RX ADMIN — Medication 0.25 MILLIGRAM(S): at 04:12

## 2019-11-03 RX ADMIN — BUMETANIDE 1 MILLIGRAM(S): 0.25 INJECTION INTRAMUSCULAR; INTRAVENOUS at 09:34

## 2019-11-03 RX ADMIN — SODIUM CHLORIDE 3 MILLILITER(S): 9 INJECTION INTRAMUSCULAR; INTRAVENOUS; SUBCUTANEOUS at 22:10

## 2019-11-03 RX ADMIN — HEPARIN SODIUM 5000 UNIT(S): 5000 INJECTION INTRAVENOUS; SUBCUTANEOUS at 05:48

## 2019-11-03 RX ADMIN — ATORVASTATIN CALCIUM 40 MILLIGRAM(S): 80 TABLET, FILM COATED ORAL at 21:09

## 2019-11-03 RX ADMIN — SODIUM CHLORIDE 3 MILLILITER(S): 9 INJECTION INTRAMUSCULAR; INTRAVENOUS; SUBCUTANEOUS at 05:48

## 2019-11-03 RX ADMIN — CEFEPIME 100 MILLIGRAM(S): 1 INJECTION, POWDER, FOR SOLUTION INTRAMUSCULAR; INTRAVENOUS at 17:43

## 2019-11-03 RX ADMIN — SODIUM CHLORIDE 3 MILLILITER(S): 9 INJECTION INTRAMUSCULAR; INTRAVENOUS; SUBCUTANEOUS at 00:22

## 2019-11-03 RX ADMIN — Medication 50 GRAM(S): at 06:21

## 2019-11-03 RX ADMIN — ATORVASTATIN CALCIUM 40 MILLIGRAM(S): 80 TABLET, FILM COATED ORAL at 00:38

## 2019-11-03 RX ADMIN — Medication 81 MILLIGRAM(S): at 17:41

## 2019-11-03 RX ADMIN — AMIODARONE HYDROCHLORIDE 200 MILLIGRAM(S): 400 TABLET ORAL at 06:21

## 2019-11-03 RX ADMIN — BUMETANIDE 1 MILLIGRAM(S): 0.25 INJECTION INTRAMUSCULAR; INTRAVENOUS at 06:46

## 2019-11-03 RX ADMIN — SENNA PLUS 1 TABLET(S): 8.6 TABLET ORAL at 17:42

## 2019-11-03 RX ADMIN — HEPARIN SODIUM 5000 UNIT(S): 5000 INJECTION INTRAVENOUS; SUBCUTANEOUS at 21:13

## 2019-11-03 RX ADMIN — Medication 250 MILLIGRAM(S): at 06:46

## 2019-11-03 RX ADMIN — SODIUM CHLORIDE 3 MILLILITER(S): 9 INJECTION INTRAMUSCULAR; INTRAVENOUS; SUBCUTANEOUS at 17:22

## 2019-11-03 RX ADMIN — POLYETHYLENE GLYCOL 3350 17 GRAM(S): 17 POWDER, FOR SOLUTION ORAL at 17:28

## 2019-11-03 RX ADMIN — GABAPENTIN 100 MILLIGRAM(S): 400 CAPSULE ORAL at 05:48

## 2019-11-03 RX ADMIN — CARVEDILOL PHOSPHATE 3.12 MILLIGRAM(S): 80 CAPSULE, EXTENDED RELEASE ORAL at 06:47

## 2019-11-03 RX ADMIN — Medication 50 GRAM(S): at 09:34

## 2019-11-03 RX ADMIN — Medication 250 MILLIGRAM(S): at 17:43

## 2019-11-03 RX ADMIN — PANTOPRAZOLE SODIUM 40 MILLIGRAM(S): 20 TABLET, DELAYED RELEASE ORAL at 06:26

## 2019-11-03 NOTE — H&P ADULT - ATTENDING COMMENTS
67 y/o F with h/o CAD s/p CABG, ICM, chronic systolic heart failure with EF ~ 25% coming in with RLE pain.   THere is no evidence of infection at SVG harvest sites, yet the leg is exquisitely tender to touch.  Patient to be admitted for cellulitis  Pt's R pleural effusion was tapped.  pt opacified her RLL after the tap overnight  SOB  admitted to be treated for PNA  pt was well managed from heart failure standpoint

## 2019-11-03 NOTE — H&P ADULT - NSHPREVIEWOFSYSTEMS_GEN_ALL_CORE
Review of Systems  CONSTITUTIONAL:  Fevers[ x] chills[ ] sweats[ ] fatigue[ x] weight loss[ ] weight gain [ ]                                     NEGATIVE [X ]   NEURO:  parathesias[ ] seizures [ ]  syncope [ ]  confusion [ ]                                                                                NEGATIVE[ X]   EYES: glasses[ ]  blurry vision[ ]  discharge[ ] pain[ ] glaucoma [ ]                                                                          NEGATIVE[X ]   ENMT:  difficulty hearing [ ]  vertigo[ ]  dysphagia[ ] epistaxis[ ] recent dental work [ ]                                    NEGATIVE[ X]   CV:  chest pain[ ] palpitations[ ] VOGEL [ x] diaphoresis [ ]                                                                                           NEGATIVE[ X]   RESPIRATORY:  wheezing[ ] SOB[x ] cough [ ] sputum[ ] hemoptysis[ ]                                                                  NEGATIVE[ ]   GI:  nausea[ ]  vommiting [ ]  diarrhea[ ] constipation [ ] melena [ ]                                                                      NEGATIVE[ X]   : hematuria[ ]  dysuria[ ] urgency[ ] incontinence[ ]                                                                                            NEGATIVE[ X]   MUSKULOSKELETAL:  arthritis[ ]  joint swelling [ ] muscle weakness [ ] Hx vein stripping [ ]                             NEGATIVE[X ]   SKIN/BREAST:  rash[ ] itching [ ]  hair loss[ ] masses[ ]                                                                                              NEGATIVE[ X]   PSYCH:  dementia [ ] depresion [ ] anxiety[ ]                                                                                                               NEGATIVE[X ]   HEME/LYMPH:  bruises easily[ ] enlarged lymph nodes[ ] tender lymph nodes[ ]                                               NEGATIVE[ X]   ENDOCRINE:  cold intolerance[ ] heat intolerance[ ] polydipsia[ ]                                                                          NEGATIVE[ X]

## 2019-11-03 NOTE — PROGRESS NOTE ADULT - SUBJECTIVE AND OBJECTIVE BOX
OPERATIVE PROCEDURE(s):                POD #                       68yFemale  SURGEON(s): CHINA Escobar  SUBJECTIVE ASSESSMENT:   Vital Signs Last 24 Hrs  T(F): 99.5 (2019 08:15), Max: 99.5 (2019 08:15)  HR: 94 (2019 08:15) (87 - 113)  BP: 113/66 (2019 08:15) (92/51 - 125/61)  BP(mean): 86 (2019 08:15) (67 - 86)  RR: 27 (2019 08:15) (17 - 35)  SpO2: 97% (2019 08:15) (95% - 100%)      I&O's Detail    2019 08:01  -  2019 07:00  --------------------------------------------------------  IN:    insulin regular Infusion: 21 mL    IV PiggyBack: 600 mL    milrinone  Infusion: 35.7 mL    Oral Fluid: 180 mL    vasopressin Infusion: 14.4 mL  Total IN: 851.1 mL    OUT:    Indwelling Catheter - Urethral: 970 mL    Voided: 775 mL  Total OUT: 1745 mL        Net:   I&O's Detail    2019 07:01  -  2019 07:00  --------------------------------------------------------  Total NET: -200 mL      2019 08:01  -  2019 07:00  --------------------------------------------------------  Total NET: -893.9 mL        CAPILLARY BLOOD GLUCOSE      POCT Blood Glucose.: 137 mg/dL (2019 07:24)  POCT Blood Glucose.: 150 mg/dL (2019 07:06)  POCT Blood Glucose.: 168 mg/dL (2019 06:13)  POCT Blood Glucose.: 106 mg/dL (2019 04:05)  POCT Blood Glucose.: 68 mg/dL (2019 01:58)  POCT Blood Glucose.: 130 mg/dL (2019 00:07)  POCT Blood Glucose.: 168 mg/dL (2019 23:02)  POCT Blood Glucose.: 158 mg/dL (2019 17:43)  POCT Blood Glucose.: 256 mg/dL (2019 12:04)    Physical Exam:  General: NAD; A&Ox3/Patient is intubated and sedated  Cardiac: S1/S2, RRR, no murmur, no rubs  Lungs: unlabored respirations, CTA b/l, no wheeze, no rales, no crackles  Abdomen: Soft/NT/ND; positive bowel sounds x 4  Sternum: Intact, no click, incision healing well with no drainage  Incisions: Incisions clean/dry/intact  Extremities: No edema b/l lower extremities; good capillary refill; no cyanosis; palpable 1+ pedal pulses b/l    Central Venous Catheter: Yes[]  No[] , If Yes indication:           Day #  Castellon Catheter: Yes  [] , No  [] , If yes indication:                      Day #  NGT: Yes [] No [] ,    If Yes Placement:                                     Day #  EPICARDIAL WIRES:  [] YES [] NO                                              Day #  BOWEL MOVEMENT:  [] YES [] NO, If No, Timing since last BM:      Day #  CHEST TUBE(Left/Right):  [] YES [] NO, If yes -  AIR LEAKS:  [] YES [] NO        LABS:                        8.2<L>  7.25  )-----------( 200      ( 2019 05:22 )             27.5<L>                        9.5<L>  9.99  )-----------( 206      ( 2019 19:28 )             31.0<L>        142  |  99  |  47<H>  ----------------------------<  59<L>  4.9   |  32  |  1.2      140  |  98  |  51<H>  ----------------------------<  163<H>  5.2<H>   |  31  |  1.3    Ca    8.8      2019 05:22  Mg     1.7     11-    TPro  5.3<L> [6.0 - 8.0]  /  Alb  2.8<L> [3.5 - 5.2]  /  TBili  <0.2 [0.2 - 1.2]  /  DBili  x   /  AST  20 [0 - 41]  /  ALT  7 [0 - 41]  /  AlkPhos  134<H> [30 - 115]  11-03    PT/INR - ( 2019 10:40 )   PT: ;   INR: 1.03 ratio         PTT - ( 2019 10:40 )  PTT:37.5 sec  Urinalysis Basic - ( 2019 19:40 )    Color: Light Yellow / Appearance: Clear / S.014 / pH: x  Gluc: x / Ketone: Negative  / Bili: Negative / Urobili: <2 mg/dL   Blood: x / Protein: 30 mg/dL / Nitrite: Negative   Leuk Esterase: Moderate / RBC: 14 /HPF / WBC 7 /HPF   Sq Epi: x / Non Sq Epi: 3 /HPF / Bacteria: Negative      ABG - ( 2019 07:50 )  pH: 7.32  /  pCO2: 67    /  pO2: 68    / HCO3: 34    / Base Excess: 6.8   /  SaO2: 94    /  LA: 0.7              RADIOLOGY & ADDITIONAL TESTS:  CXR:  EKG:  MEDICATIONS  (STANDING):  aMIOdarone    Tablet 200 milliGRAM(s) Oral daily  aspirin  chewable 81 milliGRAM(s) Oral daily  atorvastatin 40 milliGRAM(s) Oral at bedtime  buMETAnide 1 milliGRAM(s) Oral daily  carvedilol 3.125 milliGRAM(s) Oral every 12 hours  cefepime   IVPB 1000 milliGRAM(s) IV Intermittent every 12 hours  dextrose 5%. 1000 milliLiter(s) (50 mL/Hr) IV Continuous <Continuous>  dextrose 50% Injectable 12.5 Gram(s) IV Push once  dextrose 50% Injectable 25 Gram(s) IV Push once  dextrose 50% Injectable 25 Gram(s) IV Push once  gabapentin 100 milliGRAM(s) Oral two times a day  heparin  Injectable 5000 Unit(s) SubCutaneous every 8 hours  insulin regular Infusion 1 Unit(s)/Hr (1 mL/Hr) IV Continuous <Continuous>  pantoprazole    Tablet 40 milliGRAM(s) Oral before breakfast  polyethylene glycol 3350 17 Gram(s) Oral every 24 hours  sacubitril 24 mG/valsartan 26 mG 1 Tablet(s) Oral two times a day  senna 1 Tablet(s) Oral every 12 hours  sodium chloride 0.9% lock flush 3 milliLiter(s) IV Push every 8 hours  sodium chloride 0.9%. 500 milliLiter(s) (50 mL/Hr) IV Continuous <Continuous>  tamsulosin Oral Tab/Cap - Peds 0.4 milliGRAM(s) Oral at bedtime  tiotropium 18 MICROgram(s) Capsule 1 Capsule(s) Inhalation daily  vancomycin  IVPB 1000 milliGRAM(s) IV Intermittent every 12 hours  vancomycin  IVPB      vasopressin Infusion 0.033 Unit(s)/Min (2 mL/Hr) IV Continuous <Continuous>    MEDICATIONS  (PRN):  dextrose 40% Gel 15 Gram(s) Oral once PRN Blood Glucose LESS THAN 70 milliGRAM(s)/deciliter  glucagon  Injectable 1 milliGRAM(s) IntraMuscular once PRN Glucose LESS THAN 70 milligrams/deciliter  morphine  - Injectable 2 milliGRAM(s) IV Push every 5 minutes PRN Chest Pain    HEPARIN:  [] YES [] NO  Dose: XX UNITS/HR UNITS Q8H  LOVENOX:[] YES [] NO  Dose: XX mg Q24H  COUMADIN: []  YES [] NO  Dose: XX mg  Q24H  SCD's: YES b/l  GI Prophylaxis: Protonix [], Pepcid [], None [], (Contra-indication:.....)    Post-Op Beta-Blockers: Yes [], No[], If No, then contraindication:  Post-Op Aspirin: Yes [],  No [], If No, then contraindication:  Post-Op Statin: Yes [], No[], If No, then contraindication:  Allergies    No Known Allergies    Intolerances      Ambulation/Activity Status:    Assessment/Plan:  68y Female status-post .....  - Case and plan discussed with CTU Intensivist and CT Surgeon - Dr. Urbano/Flaco/Shawn   - Continue CTU supportive care    - Continue DVT/GI prophylaxis  - Incentive Spirometry 10 times an hour  - Continue to advance physical activity as tolerated and continue PT/OT as directed  1. CAD: Continue ASA, statin, BB  2. HTN:   3. A. Fib:   4. COPD/Hypoxia:   5. DM/Glucose Control:     Social Service Disposition:

## 2019-11-03 NOTE — H&P ADULT - NSHPPHYSICALEXAM_GEN_ALL_CORE
PHYSICAL EXAM  Vital Signs Last 24 Hrs  T(C): 36.3 (02 Nov 2019 21:15), Max: 37.3 (02 Nov 2019 15:09)  T(F): 97.3 (02 Nov 2019 21:15), Max: 99.1 (02 Nov 2019 15:09)  HR: 93 (03 Nov 2019 07:00) (87 - 113)  BP: 95/52 (03 Nov 2019 07:00) (92/51 - 125/61)  BP(mean): 71 (03 Nov 2019 07:00) (67 - 84)  RR: 21 (03 Nov 2019 07:00) (17 - 35)  SpO2: 100% (03 Nov 2019 07:00) (95% - 100%)    CONSTITUTIONAL:                                                                          WNL[ x]   Neuro: WNL[x ] Normal exam oriented to person/place & time with no focal motor or sensory  deficits. Other                     Eyes: WNL[ x]   Normal exam of conjunctiva & lids, pupils equally reactive. Other     ENT: WNL[x ]    Normal exam of nasal/oral mucosa with absence of cyanosis. Other  Neck: WNL[ x]  Normal exam of jugular veins, trachea & thyroid. Other  Chest: WNL[ ]     bilaterally crackles, decreased bs as bases bilaterally, right > left                                                                            CV:  Auscultation: normal [x ] S3[ ] S4[ ] Irregular [ ] Rub[ ] Clicks[ ]    Murmurs none:[x ]systolic [ ]  diastolic [ ] holosystolic [ ]  Carotids: No Bruits[x ] Other                 Abdominal Aorta: normal [ ] nonpalpable[ ]Other                                                                                      GI:           WNL[x ] Normal exam of abdomen, liver & spleen with no noted masses or tenderness. Other                                                                                                        Extremities: WNL[ ]bilateral lower extremity edema, left greater than right, right calf warm to touch, tender to palpation- no drainage   Lower Extremity Pulses: Right[ ] Left[ ]Varicosities[ ]  SKIN :WNL[ x] Normal exam to inspection & palation. Other: sternal incision healing well

## 2019-11-03 NOTE — H&P ADULT - NSHPSOCIALHISTORY_GEN_ALL_CORE
SOCIAL HISTORY:  Smoker: [ ] Yes  [x ] No        PACK YEARS:                         WHEN QUIT?  ETOH use: [ ] Yes  [ x] No              FREQUENCY / QUANTITY:  Ilicit Drug use:  [ ] Yes  [x ] No    Lives with: daughter

## 2019-11-03 NOTE — H&P ADULT - NSHPLABSRESULTS_GEN_ALL_CORE
LABS:                        8.2    7.25  )-----------( 200      ( 2019 05:22 )             27.5     11    142  |  99  |  47<H>  ----------------------------<  59<L>  4.9   |  32  |  1.2    Ca    8.8      2019 05:22  Mg     1.7         TPro  5.3<L>  /  Alb  2.8<L>  /  TBili  <0.2  /  DBili  x   /  AST  20  /  ALT  7   /  AlkPhos  134<H>  11-    PT/INR - ( 2019 10:40 )   PT: 11.80 sec;   INR: 1.03 ratio         PTT - ( 2019 10:40 )  PTT:37.5 sec  Urinalysis Basic - ( 2019 09:12 )    Color: Yellow / Appearance: Slightly Turbid / S.017 / pH: x  Gluc: x / Ketone: Negative  / Bili: Negative / Urobili: <2 mg/dL   Blood: x / Protein: 30 mg/dL / Nitrite: Negative   Leuk Esterase: Large / RBC: 1 /HPF / WBC 26 /HPF   Sq Epi: x / Non Sq Epi: 1 /HPF / Bacteria: Many      CARDIAC MARKERS ( 2019 18:02 )  x     / x     / 198 U/L / x     / x      CARDIAC MARKERS ( 2019 09:12 )  x     / 0.16 ng/mL / x     / x     / x

## 2019-11-03 NOTE — H&P ADULT - HISTORY OF PRESENT ILLNESS
67 y/o F with h/o CAD s/p CABG, ICM, chronic systolic heart failure with EF ~ 25% coming today with rash in RLE and pain. Patient can't bear any weight. The pain started suddenly. She also notes some SOB but this is not that significant compared to her baseline. She can walk minimally around the house before getting SOB. She denies PND or orthopnea but has pedal edema. No LOC, palpitations, bleeding episodes.

## 2019-11-03 NOTE — H&P ADULT - ASSESSMENT
68 year-old female with recent CABG, now presents with right lower extremity pain and swelling, as well as shortness of breath and RA sat 90%. Pt found to have right pleural effusion which was drained on 11/1/19 1400cc serosanginous fluid, repeat cxr on 11/2/19 showed right plueral effusion and pulm edema. pt also has cellulities of right lower extremity placed on iv abx. On presentation her Cr 1.8.   Admit to CTU under Dr. Escobar service  1. Right pleural effusion: already drained 1400cc serosanginous fluid, may need ct or pigtail placement- will check ct chest and cxr today  2. Chronic systolic heart failure: cont medical therapy as per Dr. Gandara  3. Continue iv abx for right lower extremity cellulitis- check ct of lower extremities to rule out abscess  4. Cr 1.8 on admission- trended down to 1.3- will continue to monitor  5. dvt/gi prophylaxis

## 2019-11-04 LAB
ALBUMIN SERPL ELPH-MCNC: 3.1 G/DL — LOW (ref 3.5–5.2)
ALP SERPL-CCNC: 146 U/L — HIGH (ref 30–115)
ALT FLD-CCNC: 10 U/L — SIGNIFICANT CHANGE UP (ref 0–41)
ANION GAP SERPL CALC-SCNC: 12 MMOL/L — SIGNIFICANT CHANGE UP (ref 7–14)
AST SERPL-CCNC: 24 U/L — SIGNIFICANT CHANGE UP (ref 0–41)
BILIRUB SERPL-MCNC: 0.3 MG/DL — SIGNIFICANT CHANGE UP (ref 0.2–1.2)
BUN SERPL-MCNC: 45 MG/DL — HIGH (ref 10–20)
CALCIUM SERPL-MCNC: 9.3 MG/DL — SIGNIFICANT CHANGE UP (ref 8.5–10.1)
CHLORIDE SERPL-SCNC: 96 MMOL/L — LOW (ref 98–110)
CO2 SERPL-SCNC: 30 MMOL/L — SIGNIFICANT CHANGE UP (ref 17–32)
CREAT SERPL-MCNC: 1 MG/DL — SIGNIFICANT CHANGE UP (ref 0.7–1.5)
GLUCOSE BLDC GLUCOMTR-MCNC: 108 MG/DL — HIGH (ref 70–99)
GLUCOSE BLDC GLUCOMTR-MCNC: 132 MG/DL — HIGH (ref 70–99)
GLUCOSE BLDC GLUCOMTR-MCNC: 149 MG/DL — HIGH (ref 70–99)
GLUCOSE BLDC GLUCOMTR-MCNC: 161 MG/DL — HIGH (ref 70–99)
GLUCOSE BLDC GLUCOMTR-MCNC: 181 MG/DL — HIGH (ref 70–99)
GLUCOSE BLDC GLUCOMTR-MCNC: 197 MG/DL — HIGH (ref 70–99)
GLUCOSE BLDC GLUCOMTR-MCNC: 87 MG/DL — SIGNIFICANT CHANGE UP (ref 70–99)
GLUCOSE SERPL-MCNC: 136 MG/DL — HIGH (ref 70–99)
HCT VFR BLD CALC: 27.4 % — LOW (ref 37–47)
HGB BLD-MCNC: 8.5 G/DL — LOW (ref 12–16)
MAGNESIUM SERPL-MCNC: 2.2 MG/DL — SIGNIFICANT CHANGE UP (ref 1.8–2.4)
MCHC RBC-ENTMCNC: 27.7 PG — SIGNIFICANT CHANGE UP (ref 27–31)
MCHC RBC-ENTMCNC: 31 G/DL — LOW (ref 32–37)
MCV RBC AUTO: 89.3 FL — SIGNIFICANT CHANGE UP (ref 81–99)
NRBC # BLD: 0 /100 WBCS — SIGNIFICANT CHANGE UP (ref 0–0)
PLATELET # BLD AUTO: 206 K/UL — SIGNIFICANT CHANGE UP (ref 130–400)
POTASSIUM SERPL-MCNC: 5.5 MMOL/L — HIGH (ref 3.5–5)
POTASSIUM SERPL-SCNC: 5.5 MMOL/L — HIGH (ref 3.5–5)
PROT SERPL-MCNC: 5.6 G/DL — LOW (ref 6–8)
RBC # BLD: 3.07 M/UL — LOW (ref 4.2–5.4)
RBC # FLD: 16.8 % — HIGH (ref 11.5–14.5)
SODIUM SERPL-SCNC: 138 MMOL/L — SIGNIFICANT CHANGE UP (ref 135–146)
WBC # BLD: 8.65 K/UL — SIGNIFICANT CHANGE UP (ref 4.8–10.8)
WBC # FLD AUTO: 8.65 K/UL — SIGNIFICANT CHANGE UP (ref 4.8–10.8)

## 2019-11-04 PROCEDURE — 99233 SBSQ HOSP IP/OBS HIGH 50: CPT

## 2019-11-04 PROCEDURE — 71045 X-RAY EXAM CHEST 1 VIEW: CPT | Mod: 26

## 2019-11-04 PROCEDURE — 99232 SBSQ HOSP IP/OBS MODERATE 35: CPT

## 2019-11-04 PROCEDURE — 99024 POSTOP FOLLOW-UP VISIT: CPT

## 2019-11-04 PROCEDURE — 93971 EXTREMITY STUDY: CPT | Mod: 26,RT

## 2019-11-04 PROCEDURE — 36573 INSJ PICC RS&I 5 YR+: CPT | Mod: 78

## 2019-11-04 RX ORDER — INSULIN LISPRO 100/ML
VIAL (ML) SUBCUTANEOUS
Refills: 0 | Status: DISCONTINUED | OUTPATIENT
Start: 2019-11-04 | End: 2019-11-19

## 2019-11-04 RX ORDER — ISOSORBIDE DINITRATE 5 MG/1
10 TABLET ORAL THREE TIMES A DAY
Refills: 0 | Status: DISCONTINUED | OUTPATIENT
Start: 2019-11-04 | End: 2019-11-09

## 2019-11-04 RX ORDER — ISOSORBIDE DINITRATE 5 MG/1
5 TABLET ORAL THREE TIMES A DAY
Refills: 0 | Status: DISCONTINUED | OUTPATIENT
Start: 2019-11-04 | End: 2019-11-04

## 2019-11-04 RX ORDER — ACETAMINOPHEN 500 MG
650 TABLET ORAL EVERY 6 HOURS
Refills: 0 | Status: DISCONTINUED | OUTPATIENT
Start: 2019-11-04 | End: 2019-11-19

## 2019-11-04 RX ORDER — CARVEDILOL PHOSPHATE 80 MG/1
6.25 CAPSULE, EXTENDED RELEASE ORAL EVERY 12 HOURS
Refills: 0 | Status: DISCONTINUED | OUTPATIENT
Start: 2019-11-04 | End: 2019-11-12

## 2019-11-04 RX ORDER — DEXTROSE 50 % IN WATER 50 %
25 SYRINGE (ML) INTRAVENOUS ONCE
Refills: 0 | Status: DISCONTINUED | OUTPATIENT
Start: 2019-11-04 | End: 2019-11-19

## 2019-11-04 RX ORDER — GLUCAGON INJECTION, SOLUTION 0.5 MG/.1ML
1 INJECTION, SOLUTION SUBCUTANEOUS ONCE
Refills: 0 | Status: DISCONTINUED | OUTPATIENT
Start: 2019-11-04 | End: 2019-11-19

## 2019-11-04 RX ORDER — IPRATROPIUM/ALBUTEROL SULFATE 18-103MCG
3 AEROSOL WITH ADAPTER (GRAM) INHALATION EVERY 6 HOURS
Refills: 0 | Status: DISCONTINUED | OUTPATIENT
Start: 2019-11-04 | End: 2019-11-16

## 2019-11-04 RX ORDER — HYDRALAZINE HCL 50 MG
10 TABLET ORAL EVERY 8 HOURS
Refills: 0 | Status: DISCONTINUED | OUTPATIENT
Start: 2019-11-04 | End: 2019-11-09

## 2019-11-04 RX ORDER — CARVEDILOL PHOSPHATE 80 MG/1
3.12 CAPSULE, EXTENDED RELEASE ORAL EVERY 12 HOURS
Refills: 0 | Status: DISCONTINUED | OUTPATIENT
Start: 2019-11-04 | End: 2019-11-04

## 2019-11-04 RX ORDER — BUMETANIDE 0.25 MG/ML
0.5 INJECTION INTRAMUSCULAR; INTRAVENOUS ONCE
Refills: 0 | Status: COMPLETED | OUTPATIENT
Start: 2019-11-04 | End: 2019-11-04

## 2019-11-04 RX ORDER — BUMETANIDE 0.25 MG/ML
1 INJECTION INTRAMUSCULAR; INTRAVENOUS DAILY
Refills: 0 | Status: DISCONTINUED | OUTPATIENT
Start: 2019-11-04 | End: 2019-11-07

## 2019-11-04 RX ORDER — INSULIN GLARGINE 100 [IU]/ML
20 INJECTION, SOLUTION SUBCUTANEOUS ONCE
Refills: 0 | Status: COMPLETED | OUTPATIENT
Start: 2019-11-04 | End: 2019-11-04

## 2019-11-04 RX ORDER — ASCORBIC ACID 60 MG
500 TABLET,CHEWABLE ORAL DAILY
Refills: 0 | Status: DISCONTINUED | OUTPATIENT
Start: 2019-11-04 | End: 2019-11-19

## 2019-11-04 RX ORDER — INSULIN GLARGINE 100 [IU]/ML
20 INJECTION, SOLUTION SUBCUTANEOUS EVERY MORNING
Refills: 0 | Status: DISCONTINUED | OUTPATIENT
Start: 2019-11-05 | End: 2019-11-13

## 2019-11-04 RX ORDER — ZINC SULFATE TAB 220 MG (50 MG ZINC EQUIVALENT) 220 (50 ZN) MG
220 TAB ORAL DAILY
Refills: 0 | Status: COMPLETED | OUTPATIENT
Start: 2019-11-04 | End: 2019-11-18

## 2019-11-04 RX ORDER — METFORMIN HYDROCHLORIDE 850 MG/1
1000 TABLET ORAL EVERY 12 HOURS
Refills: 0 | Status: DISCONTINUED | OUTPATIENT
Start: 2019-11-04 | End: 2019-11-12

## 2019-11-04 RX ORDER — DEXTROSE 50 % IN WATER 50 %
12.5 SYRINGE (ML) INTRAVENOUS ONCE
Refills: 0 | Status: DISCONTINUED | OUTPATIENT
Start: 2019-11-04 | End: 2019-11-19

## 2019-11-04 RX ORDER — DEXTROSE 50 % IN WATER 50 %
15 SYRINGE (ML) INTRAVENOUS ONCE
Refills: 0 | Status: DISCONTINUED | OUTPATIENT
Start: 2019-11-04 | End: 2019-11-19

## 2019-11-04 RX ADMIN — HEPARIN SODIUM 5000 UNIT(S): 5000 INJECTION INTRAVENOUS; SUBCUTANEOUS at 15:12

## 2019-11-04 RX ADMIN — METFORMIN HYDROCHLORIDE 1000 MILLIGRAM(S): 850 TABLET ORAL at 17:47

## 2019-11-04 RX ADMIN — Medication 650 MILLIGRAM(S): at 21:07

## 2019-11-04 RX ADMIN — SODIUM CHLORIDE 3 MILLILITER(S): 9 INJECTION INTRAMUSCULAR; INTRAVENOUS; SUBCUTANEOUS at 07:10

## 2019-11-04 RX ADMIN — SENNA PLUS 1 TABLET(S): 8.6 TABLET ORAL at 17:48

## 2019-11-04 RX ADMIN — CARVEDILOL PHOSPHATE 6.25 MILLIGRAM(S): 80 CAPSULE, EXTENDED RELEASE ORAL at 17:48

## 2019-11-04 RX ADMIN — HEPARIN SODIUM 5000 UNIT(S): 5000 INJECTION INTRAVENOUS; SUBCUTANEOUS at 06:21

## 2019-11-04 RX ADMIN — Medication 250 MILLIGRAM(S): at 18:34

## 2019-11-04 RX ADMIN — CARVEDILOL PHOSPHATE 3.12 MILLIGRAM(S): 80 CAPSULE, EXTENDED RELEASE ORAL at 10:31

## 2019-11-04 RX ADMIN — ISOSORBIDE DINITRATE 5 MILLIGRAM(S): 5 TABLET ORAL at 15:11

## 2019-11-04 RX ADMIN — ISOSORBIDE DINITRATE 5 MILLIGRAM(S): 5 TABLET ORAL at 08:55

## 2019-11-04 RX ADMIN — SODIUM CHLORIDE 3 MILLILITER(S): 9 INJECTION INTRAMUSCULAR; INTRAVENOUS; SUBCUTANEOUS at 14:08

## 2019-11-04 RX ADMIN — Medication 650 MILLIGRAM(S): at 21:08

## 2019-11-04 RX ADMIN — CEFEPIME 100 MILLIGRAM(S): 1 INJECTION, POWDER, FOR SOLUTION INTRAMUSCULAR; INTRAVENOUS at 06:55

## 2019-11-04 RX ADMIN — Medication 650 MILLIGRAM(S): at 11:00

## 2019-11-04 RX ADMIN — Medication 10 MILLIGRAM(S): at 15:12

## 2019-11-04 RX ADMIN — TIOTROPIUM BROMIDE 1 CAPSULE(S): 18 CAPSULE ORAL; RESPIRATORY (INHALATION) at 08:44

## 2019-11-04 RX ADMIN — POLYETHYLENE GLYCOL 3350 17 GRAM(S): 17 POWDER, FOR SOLUTION ORAL at 17:48

## 2019-11-04 RX ADMIN — ATORVASTATIN CALCIUM 40 MILLIGRAM(S): 80 TABLET, FILM COATED ORAL at 21:07

## 2019-11-04 RX ADMIN — Medication 250 MILLIGRAM(S): at 06:55

## 2019-11-04 RX ADMIN — Medication 81 MILLIGRAM(S): at 11:33

## 2019-11-04 RX ADMIN — BUMETANIDE 1 MILLIGRAM(S): 0.25 INJECTION INTRAMUSCULAR; INTRAVENOUS at 08:58

## 2019-11-04 RX ADMIN — Medication 2: at 11:20

## 2019-11-04 RX ADMIN — BUMETANIDE 0.5 MILLIGRAM(S): 0.25 INJECTION INTRAMUSCULAR; INTRAVENOUS at 21:06

## 2019-11-04 RX ADMIN — Medication 2: at 17:00

## 2019-11-04 RX ADMIN — PANTOPRAZOLE SODIUM 40 MILLIGRAM(S): 20 TABLET, DELAYED RELEASE ORAL at 06:21

## 2019-11-04 RX ADMIN — Medication 650 MILLIGRAM(S): at 10:30

## 2019-11-04 RX ADMIN — AMIODARONE HYDROCHLORIDE 200 MILLIGRAM(S): 400 TABLET ORAL at 06:22

## 2019-11-04 RX ADMIN — ISOSORBIDE DINITRATE 10 MILLIGRAM(S): 5 TABLET ORAL at 21:07

## 2019-11-04 RX ADMIN — INSULIN GLARGINE 20 UNIT(S): 100 INJECTION, SOLUTION SUBCUTANEOUS at 10:31

## 2019-11-04 RX ADMIN — TAMSULOSIN HYDROCHLORIDE 0.4 MILLIGRAM(S): 0.4 CAPSULE ORAL at 21:07

## 2019-11-04 RX ADMIN — METFORMIN HYDROCHLORIDE 1000 MILLIGRAM(S): 850 TABLET ORAL at 10:36

## 2019-11-04 RX ADMIN — Medication 10 MILLIGRAM(S): at 21:07

## 2019-11-04 RX ADMIN — HEPARIN SODIUM 5000 UNIT(S): 5000 INJECTION INTRAVENOUS; SUBCUTANEOUS at 21:08

## 2019-11-04 RX ADMIN — CEFEPIME 100 MILLIGRAM(S): 1 INJECTION, POWDER, FOR SOLUTION INTRAMUSCULAR; INTRAVENOUS at 17:48

## 2019-11-04 NOTE — PROGRESS NOTE ADULT - SUBJECTIVE AND OBJECTIVE BOX
CTU Attending Progress Daily Note     2019 10:34  POD# -   He has history of Female bladder prolapse  Diabetes    Interval event for past 24 hr:  GUS WILBURN  68y had no event.   Current Complains:  GUS WILBURN has no new complains  HPI:  67 y/o F with h/o CAD s/p CABG, ICM, chronic systolic heart failure with EF ~ 25% coming today with rash in RLE and pain. Patient can't bear any weight. The pain started suddenly. She also notes some SOB but this is not that significant compared to her baseline. She can walk minimally around the house before getting SOB. She denies PND or orthopnea but has pedal edema. No LOC, palpitations, bleeding episodes. (2019 07:05)    OBJECTIVE:  ICU Vital Signs Last 24 Hrs  T(C): 36.7 (2019 08:00), Max: 37.5 (2019 14:00)  T(F): 98 (2019 08:00), Max: 99.5 (2019 14:00)  HR: 90 (2019 10:00) (83 - 98)  BP: 107/55 (2019 10:00) (103/55 - 132/63)  BP(mean): 77 (2019 10:00) (73 - 91)  ABP: --  ABP(mean): --  RR: 20 (2019 10:00) (17 - 26)  SpO2: 98% (2019 10:00) (94% - 100%)    I&O's Summary    2019 07:  -  2019 07:00  --------------------------------------------------------  IN: 1791.8 mL / OUT: 2108 mL / NET: -316.2 mL    2019 07:01  -  2019 10:34  --------------------------------------------------------  IN: 180 mL / OUT: 240 mL / NET: -60 mL      I&O's Detail    2019 07:01  -  2019 07:00  --------------------------------------------------------  IN:    insulin regular Infusion: 69 mL    IV PiggyBack: 650 mL    Oral Fluid: 1020 mL    vasopressin Infusion: 52.8 mL  Total IN: 1791.8 mL    OUT:    Indwelling Catheter - Urethral: 2108 mL  Total OUT: 2108 mL    Total NET: -316.2 mL      2019 07:01  -  2019 10:34  --------------------------------------------------------  IN:    IV PiggyBack: 60 mL    Oral Fluid: 120 mL  Total IN: 180 mL    OUT:    Indwelling Catheter - Urethral: 240 mL  Total OUT: 240 mL    Total NET: -60 mL        Adult Advanced Hemodynamics Last 24 Hrs  CVP(mm Hg): --  CVP(cm H2O): --  CO: --  CI: --  PA: --  PA(mean): --  PCWP: --  SVR: --  SVRI: --  PVR: --  PVRI: --    CAPILLARY BLOOD GLUCOSE      POCT Blood Glucose.: 87 mg/dL (2019 06:39)  POCT Blood Glucose.: 149 mg/dL (2019 02:44)  POCT Blood Glucose.: 132 mg/dL (2019 01:32)  POCT Blood Glucose.: 108 mg/dL (2019 00:26)  POCT Blood Glucose.: 97 mg/dL (2019 22:50)  POCT Blood Glucose.: 223 mg/dL (2019 20:45)  POCT Blood Glucose.: 293 mg/dL (2019 18:27)  POCT Blood Glucose.: 322 mg/dL (2019 16:40)    LABS:  ABG - ( 2019 18:49 )  pH, Arterial: 7.31  pH, Blood: x     /  pCO2: 68    /  pO2: 82    / HCO3: 34    / Base Excess: 6.9   /  SaO2: 97                                      8.5    8.65  )-----------( 206      ( 2019 01:40 )             27.4         138  |  96<L>  |  45<H>  ----------------------------<  136<H>  5.5<H>   |  30  |  1.0    Ca    9.3      2019 01:40  Mg     2.2         TPro  5.6<L>  /  Alb  3.1<L>  /  TBili  0.3  /  DBili  x   /  AST  24  /  ALT  10  /  AlkPhos  146<H>        Urinalysis Basic - ( 2019 19:40 )    Color: Light Yellow / Appearance: Clear / S.014 / pH: x  Gluc: x / Ketone: Negative  / Bili: Negative / Urobili: <2 mg/dL   Blood: x / Protein: 30 mg/dL / Nitrite: Negative   Leuk Esterase: Moderate / RBC: 14 /HPF / WBC 7 /HPF   Sq Epi: x / Non Sq Epi: 3 /HPF / Bacteria: Negative        Home Medications:  acetaminophen 325 mg oral tablet: 2 tab(s) orally every 6 hours, As needed, Mild Pain (1 - 3) (18 Oct 2019 14:37)    HOSPITAL MEDICATIONS:  MEDICATIONS  (STANDING):  albuterol/ipratropium for Nebulization 3 milliLiter(s) Nebulizer every 6 hours  aMIOdarone    Tablet 200 milliGRAM(s) Oral daily  aspirin  chewable 81 milliGRAM(s) Oral daily  atorvastatin 40 milliGRAM(s) Oral at bedtime  buMETAnide Injectable 1 milliGRAM(s) IV Push daily  carvedilol 3.125 milliGRAM(s) Oral every 12 hours  cefepime   IVPB 1000 milliGRAM(s) IV Intermittent every 12 hours  dextrose 5%. 1000 milliLiter(s) (50 mL/Hr) IV Continuous <Continuous>  dextrose 50% Injectable 12.5 Gram(s) IV Push once  dextrose 50% Injectable 25 Gram(s) IV Push once  dextrose 50% Injectable 25 Gram(s) IV Push once  heparin  Injectable 5000 Unit(s) SubCutaneous every 8 hours  hydrALAZINE 10 milliGRAM(s) Oral every 8 hours  insulin glargine Injectable (LANTUS) 20 Unit(s) SubCutaneous once  insulin lispro (HumaLOG) corrective regimen sliding scale   SubCutaneous three times a day before meals  isosorbide   dinitrate Tablet (ISORDIL) 5 milliGRAM(s) Oral three times a day  metFORMIN 1000 milliGRAM(s) Oral every 12 hours  pantoprazole    Tablet 40 milliGRAM(s) Oral before breakfast  polyethylene glycol 3350 17 Gram(s) Oral every 24 hours  senna 1 Tablet(s) Oral every 12 hours  sodium chloride 0.9% lock flush 3 milliLiter(s) IV Push every 8 hours  tamsulosin Oral Tab/Cap - Peds 0.4 milliGRAM(s) Oral at bedtime  tiotropium 18 MICROgram(s) Capsule 1 Capsule(s) Inhalation daily  vancomycin  IVPB 1000 milliGRAM(s) IV Intermittent every 12 hours  vancomycin  IVPB        MEDICATIONS  (PRN):  acetaminophen   Tablet .. 650 milliGRAM(s) Oral every 6 hours PRN Mild Pain (1 - 3)  dextrose 40% Gel 15 Gram(s) Oral once PRN Blood Glucose LESS THAN 70 milliGRAM(s)/deciliter  glucagon  Injectable 1 milliGRAM(s) IntraMuscular once PRN Glucose LESS THAN 70 milligrams/deciliter      REVIEW OF SYSTEMS:  CONSTITUTIONAL: [X] all negative; [ ] weakness, [ ] fevers, [ ] chills  EYES/ENT: [X] all negative; [ ] visual changes, [ ] vertigo, [ ] throat pain   NECK: [X] all negative; [ ] pain, [ ] stiffness  RESPIRATORY: [] all negative, [ ] cough, [ ] wheezing, [ ] hemoptysis, [ ] shortness of breath  CARDIOVASCULAR: [] all negative; [ ] chest pain, [ ] palpitations, [ ] orthopnea  GASTROINTESTINAL: [X] all negative; [ ]abdominal pain, [ ] nausea, [ ] vomiting, [ ] hematemesis, [ ] diarrhea, [ ] constipation, [ ] melena, [ ] hematochezia.  GENITOURINARY: [X] all negative; [ ] dysuria, [ ] frequency, [ ] hematuria  NEUROLOGICAL: [X] all negative; [ ] numbness, [ ] weakness  SKIN: [X] all negative; [ ] itching, [ ] burning, [ ] rashes, [ ] lesions   All other review of systems is negative unless indicated above.    [  ] Unable to assess ROS because     PHYSICAL EXAM:          CONSTITUTIONAL: Well-developed; well-nourished; in no acute distress.   	SKIN: warm, dry  	HEAD: Normocephalic; atraumatic.  	EYES: PERRL, EOM, no conj injection, sclera clear  	ENT: No nasal discharge; airway clear.  	NECK: Supple; non tender.  No midline ttp ctls  	CARD: S1, S2 normal; no murmurs, gallops, or rubs. Regular rate and rhythm. 2+ RPs and DPs bilat, no carotid bruits, no pedal   edema, no calf pain b/l  	RESP: CTA  bilat good air movement No wheezes, rales or rhonchi.  	ABD: Soft, not tender, not distended, no CVA ttp no rebound or guarding, bowel sounds present  	EXT: Normal ROM.  No clubbing, cyanosis or edema.   	  	NEURO: Alert, awake, motor 5/5 R, 5/5 L        RADIOLOGY:  < from: Xray Chest 1 View- PORTABLE-Routine (19 @ 05:12) >    Impression:      Unchanged moderate right pleural effusion/right basilar opacity.      < end of copied text >  xray

## 2019-11-04 NOTE — DIETITIAN INITIAL EVALUATION ADULT. - ENERGY NEEDS
calorie 1285-1608kcal (MSJ x 1.2-1.5 AF) for pressure ulcer  protein 61-73g (1.1-1.3g/kg CBW) as above+ RIC considered, will monitor and adjust PRN  fluid per CTU team

## 2019-11-04 NOTE — CONSULT NOTE ADULT - SUBJECTIVE AND OBJECTIVE BOX
67 yo female readmitted 11/2 c/o worsened SOB and right LE pain.  pt treated for cellulitis and RLL pneumonia. s/p thoracentesis of 1435 ml on 11/2 for effusion.  d/w pt and daughter at bedside - po intake minimal (often just half a Glucerna shake) for several days pta -pt relates it mostly to SOB.  pt claims her po intake has improved since the thoracentesis.    pt known to [facility] from prior hospitalizations.  PMHx + uncontrolled DM (non-compliant)  viral myopathy  Paroxysmal Afib  uterine prolapse, recent labial abscess s/p ID  hip fracture on 8/3/19 s/p fixation  readmission shortly thereafter with fecal impaction (per pt, r/t pain meds)  indwelling [place] for urinary retention  recent LGIB bleed   most recent admission for DKA, EF 25%, + NSTEMI requiring intubation for several days with decompensated CHF with pleural effusions & cardiogenic shock  9/25 + C Diff  10/7 underwent CABG  10/8 episode of melena, pt cont c/o diarrhea - see GI notes - pt NPO 10/6-10  + gluteal and perineal maceration, seen by wound care nurse specialist on previous admission, as well  10/16 thoracentesis    Vital Signs Last 24 Hrs  T(C): 36.1 (04 Nov 2019 12:00), Max: 37.3 (04 Nov 2019 04:00)  T(F): 97 (04 Nov 2019 12:00), Max: 99.2 (04 Nov 2019 04:00)  HR: 72 (04 Nov 2019 15:00) (72 - 98)  BP: 96/52 (04 Nov 2019 15:00) (96/52 - 132/63)  BP(mean): 71 (04 Nov 2019 15:00) (71 - 91)  RR: 20 (04 Nov 2019 14:00) (17 - 25)  SpO2: 98% (04 Nov 2019 15:00) (94% - 100%)  Drug Dosing Weight  Height (cm): 160.02 (04 Nov 2019 11:39)  Weight (kg): 55.8 (31 Oct 2019 19:31)  BMI (kg/m2): 21.8 (04 Nov 2019 11:39)  BSA (m2): 1.57 (04 Nov 2019 11:39)  A&O, anicteric, conj pale  skin turgor fair  ++ temporal and infraclavicular wasting, and waisting of hand and calf and quad musculature  sternal wound healing well  abd soft, ND, NT  no c/c, 1-2+ pedal edeam bilat,  but ++tenderness of R calf and Achilles area - soft, not red, not swollen  right IJ CVC insertion attempted just prior to my seeing her today - to be removed, maybe replaced.    MEDICATIONS  (STANDING):  albuterol/ipratropium for Nebulization 3 milliLiter(s) Nebulizer every 6 hours  aMIOdarone    Tablet 200 milliGRAM(s) Oral daily  aspirin  chewable 81 milliGRAM(s) Oral daily  atorvastatin 40 milliGRAM(s) Oral at bedtime  buMETAnide Injectable 1 milliGRAM(s) IV Push daily  carvedilol 3.125 milliGRAM(s) Oral every 12 hours  cefepime   IVPB 1000 milliGRAM(s) IV Intermittent every 12 hours  heparin  Injectable 5000 Unit(s) SubCutaneous every 8 hours  hydrALAZINE 10 milliGRAM(s) Oral every 8 hours  insulin lispro (HumaLOG) corrective regimen sliding scale   SubCutaneous three times a day before meals  isosorbide   dinitrate Tablet (ISORDIL) 5 milliGRAM(s) Oral three times a day  metFORMIN 1000 milliGRAM(s) Oral every 12 hours  pantoprazole    Tablet 40 milliGRAM(s) Oral before breakfast  polyethylene glycol 3350 17 Gram(s) Oral every 24 hours  senna 1 Tablet(s) Oral every 12 hours  sodium chloride 0.9% lock flush 3 milliLiter(s) IV Push every 8 hours  tamsulosin Oral Tab/Cap - Peds 0.4 milliGRAM(s) Oral at bedtime  tiotropium 18 MICROgram(s) Capsule 1 Capsule(s) Inhalation daily  vancomycin  IVPB 1000 milliGRAM(s) IV Intermittent every 12 hours                        8.5    8.65  )-----------( 206      ( 04 Nov 2019 01:40 )             27.4   11-04    138  |  96<L>  |  45<H>  ----------------------------<  136<H>  5.5<H>   |  30  |  1.0    Ca    9.3      04 Nov 2019 01:40  Mg     2.2     11-04  no phos this admission  TPro  5.6<L>  /  Alb  3.1<L>  /  TBili  0.3  /  DBili  x   /  AST  24  /  ALT  10  /  AlkPhos  146<H>  11-04  no HbA1c this admission  POCT Blood Glucose.: 181 mg/dL (04 Nov 2019 10:33)  POCT Blood Glucose.: 87 mg/dL (04 Nov 2019 06:39)  POCT Blood Glucose.: 149 mg/dL (04 Nov 2019 02:44)  POCT Blood Glucose.: 132 mg/dL (04 Nov 2019 01:32)  POCT Blood Glucose.: 108 mg/dL (04 Nov 2019 00:26)  POCT Blood Glucose.: 97 mg/dL (03 Nov 2019 22:50)  POCT Blood Glucose.: 223 mg/dL (03 Nov 2019 20:45)  POCT Blood Glucose.: 293 mg/dL (03 Nov 2019 18:27)  POCT Blood Glucose.: 322 mg/dL (03 Nov 2019 16:40)

## 2019-11-04 NOTE — PHYSICAL THERAPY INITIAL EVALUATION ADULT - ADDITIONAL COMMENTS
Pt reports that she has been trying to amb at home s/p discharge from previous hospital admission however stated "not as often as I should." Pt reports inability to ambulate since onset of RLE pain.

## 2019-11-04 NOTE — PHYSICAL THERAPY INITIAL EVALUATION ADULT - NS ASR WT BEARING DETAIL RLE
partial weight-bearing/Pt without weight bearing restriction orders however alternates between PWB and NWB RLE during this IE due to substantial pain in R calf with weight bearing.

## 2019-11-04 NOTE — DIETITIAN INITIAL EVALUATION ADULT. - ADD RECOMMEND
Add Glucerna daily Add Nepro daily, will switch to Glucerna (pt's preferred supplement) if K levels normalize

## 2019-11-04 NOTE — PROGRESS NOTE ADULT - ASSESSMENT
69 y/o F with h/o CAD s/p CABG, ICM, chronic systolic heart failure admitted with acute leg pain and rash. DVT ruled out. Patient has large pleural effusion. Volume status has improved compared to last week, except for pleural effusion

## 2019-11-04 NOTE — PROGRESS NOTE ADULT - SUBJECTIVE AND OBJECTIVE BOX
Patient admitted to CTU and started on broad spectrum Abx, started on iv diuretics. She reports feeling slightly better from her RLE pain.         67 y/o F with h/o CAD s/p CABG, ICM, chronic systolic heart failure with EF ~ 25% coming today with rash in RLE and pain. Patient can't bear any weight. The pain started suddenly. She also notes some SOB but this is not that significant compared to her baseline. She can walk minimally around the house before getting SOB. She denies PND or orthopnea but has pedal edema. No LOC, palpitations, bleeding episodes.     Patient has right pleural effusion.     Patient was found to have significant     She reports being compliant with meds.       PSH: CABG    Social: No ETOH or smoking

## 2019-11-04 NOTE — PHYSICAL THERAPY INITIAL EVALUATION ADULT - PLANNED THERAPY INTERVENTIONS, PT EVAL
gait training/bed mobility training/postural re-education/strengthening/balance training/transfer training

## 2019-11-04 NOTE — DIETITIAN INITIAL EVALUATION ADULT. - PHYSICAL APPEARANCE
BMI 21.8, alert&oriented, skin: pressure ulcer stg III to sacrum, 1+ edema to L, R leg. Unable to perform full physical assessment today as pt. did not want to be disturbed./well nourished

## 2019-11-04 NOTE — PHYSICAL THERAPY INITIAL EVALUATION ADULT - IMPAIRMENTS FOUND, PT EVAL
integumentary integrity/muscle strength/gait, locomotion, and balance/ergonomics and body mechanics/joint integrity and mobility

## 2019-11-04 NOTE — PHYSICAL THERAPY INITIAL EVALUATION ADULT - GENERAL OBSERVATIONS, REHAB EVAL
Pt rec in b/s chair with +tele, +NC 5 L/m, in NAD with daughter, RN Yuridia, and wound care specialist RN at b/s. PT assisted pt with sit to stand transfer so NSG could inspect pt skin, however unable to initiate gait due to pt c/o substantial R calf/ankle pain with any weight bearing. As per NSG, pt with stage III sacral ulcer, assessed and treated by NSG during this session.

## 2019-11-04 NOTE — PROGRESS NOTE ADULT - PROBLEM SELECTOR PLAN 1
ICM s/p CABG   Euvolemic on exam   Change Bumex to 1 mg orally once daily   Hydralazine 10 mg TID   Isordil 10 mg TID   Increase carvedilol to 6.25 mg twice daily   Strict I/Os   Daily weights   Low salt diet   PT eval   Incentive inspirometry  Discussed with CT surgery, CTU team and patient/family at bedside

## 2019-11-04 NOTE — CONSULT NOTE ADULT - SUBJECTIVE AND OBJECTIVE BOX
HPI:  69 y/o F with h/o CAD s/p CABG, ICM, chronic systolic heart failure with EF ~ 25% coming today with rash in RLE and pain. Patient can't bear any weight. The pain started suddenly. She also notes some SOB but this is not that significant compared to her baseline. She can walk minimally around the house before getting SOB. She denies PND or orthopnea but has pedal edema. No LOC, palpitations, bleeding episodes. (2019 07:05)      PAST MEDICAL & SURGICAL HISTORY:  Female bladder prolapse  Diabetes  History of repair of hip fracture      Hospital Course:    TODAY'S SUBJECTIVE & REVIEW OF SYMPTOMS:     Constitutional WNL   Cardio WNL   Resp WNL   GI WNL  Heme WNL  Endo WNL  Skin WNL  MSK Weakness  Neuro WNL  Cognitive WNL  Psych WNL      MEDICATIONS  (STANDING):  aMIOdarone    Tablet 200 milliGRAM(s) Oral daily  aspirin  chewable 81 milliGRAM(s) Oral daily  atorvastatin 40 milliGRAM(s) Oral at bedtime  buMETAnide Injectable 1 milliGRAM(s) IV Push daily  carvedilol 3.125 milliGRAM(s) Oral every 12 hours  cefepime   IVPB 1000 milliGRAM(s) IV Intermittent every 12 hours  dextrose 5%. 1000 milliLiter(s) (50 mL/Hr) IV Continuous <Continuous>  dextrose 50% Injectable 12.5 Gram(s) IV Push once  dextrose 50% Injectable 25 Gram(s) IV Push once  dextrose 50% Injectable 25 Gram(s) IV Push once  heparin  Injectable 5000 Unit(s) SubCutaneous every 8 hours  hydrALAZINE 10 milliGRAM(s) Oral every 8 hours  insulin glargine Injectable (LANTUS) 20 Unit(s) SubCutaneous once  insulin lispro (HumaLOG) corrective regimen sliding scale   SubCutaneous three times a day before meals  isosorbide   dinitrate Tablet (ISORDIL) 5 milliGRAM(s) Oral three times a day  metFORMIN 1000 milliGRAM(s) Oral every 12 hours  pantoprazole    Tablet 40 milliGRAM(s) Oral before breakfast  polyethylene glycol 3350 17 Gram(s) Oral every 24 hours  senna 1 Tablet(s) Oral every 12 hours  sodium chloride 0.9% lock flush 3 milliLiter(s) IV Push every 8 hours  tamsulosin Oral Tab/Cap - Peds 0.4 milliGRAM(s) Oral at bedtime  tiotropium 18 MICROgram(s) Capsule 1 Capsule(s) Inhalation daily  vancomycin  IVPB 1000 milliGRAM(s) IV Intermittent every 12 hours  vancomycin  IVPB        MEDICATIONS  (PRN):  dextrose 40% Gel 15 Gram(s) Oral once PRN Blood Glucose LESS THAN 70 milliGRAM(s)/deciliter  glucagon  Injectable 1 milliGRAM(s) IntraMuscular once PRN Glucose LESS THAN 70 milligrams/deciliter      FAMILY HISTORY:  FH: myocardial infarction  FH: stomach cancer      Allergies    No Known Allergies    Intolerances        SOCIAL HISTORY:    [  ] Etoh  [  ] Smoking  [  ] Substance abuse     Home Environment:  [  ] Home Alone  [ x ] Lives with Family  [  ] Home Health Aid    Dwelling:  [  ] Apartment  [x  ] Private House  [  ] Adult Home  [  ] Skilled Nursing Facility      [  ] Short Term  [  ] Long Term  [x  ] Stairs       Elevator [  ]    FUNCTIONAL STATUS PTA: (Check all that apply)  Ambulation: [x   ]Independent    [  ] Dependent     [  ] Non-Ambulatory  Assistive Device: [  ] SA Cane  [  ]  Q Cane  [ x ] Walker  [  ]  Wheelchair  ADL : [  ] Independent  [ x ]  Dependent       Vital Signs Last 24 Hrs  T(C): 36.7 (2019 08:00), Max: 37.5 (2019 14:00)  T(F): 98 (2019 08:00), Max: 99.5 (2019 14:00)  HR: 88 (2019 09:00) (79 - 98)  BP: 111/59 (2019 09:00) (103/55 - 132/63)  BP(mean): 79 (2019 09:00) (73 - 91)  RR: 22 (2019 09:00) (17 - 32)  SpO2: 99% (2019 09:00) (94% - 100%)      PHYSICAL EXAM: Alert & Oriented X3  GENERAL: NAD, well-groomed, well-developed  HEAD:  Atraumatic, Normocephalic  CHEST/LUNG: Clear   HEART: S1S2+  ABDOMEN: Soft, Nontender  EXTREMITIES:  no calf tenderness    NERVOUS SYSTEM:  Cranial Nerves 2-12 intact [  ] Abnormal  [  ]  ROM: WFL all extremities [x  ]  Abnormal [  ]  Motor Strength: WFL all extremities  [  ]  Abnormal [x  ]4/5 all ext  Sensation: intact to light touch [  ] Abnormal [x  ]decreased light touch distal legs  Reflexes: Symmetric [  ]  Abnormal [  ]    FUNCTIONAL STATUS:  Bed Mobility: Independent [  ]  Supervision [  ]  Needs Assistance [x  ]  N/A [  ]  Transfers: Independent [  ]  Supervision [  ]  Needs Assistance [x  ]  N/A [  ]   Ambulation: Independent [  ]  Supervision [  ]  Needs Assistance [  ]  N/A [  ]  ADL: Independent [  ] Requires Assistance [  ] N/A [  ]      LABS:                        8.5    8.65  )-----------( 206      ( 2019 01:40 )             27.4     11-    138  |  96<L>  |  45<H>  ----------------------------<  136<H>  5.5<H>   |  30  |  1.0    Ca    9.3      2019 01:40  Mg     2.2         TPro  5.6<L>  /  Alb  3.1<L>  /  TBili  0.3  /  DBili  x   /  AST  24  /  ALT  10  /  AlkPhos  146<H>  11-      Urinalysis Basic - ( 2019 19:40 )    Color: Light Yellow / Appearance: Clear / S.014 / pH: x  Gluc: x / Ketone: Negative  / Bili: Negative / Urobili: <2 mg/dL   Blood: x / Protein: 30 mg/dL / Nitrite: Negative   Leuk Esterase: Moderate / RBC: 14 /HPF / WBC 7 /HPF   Sq Epi: x / Non Sq Epi: 3 /HPF / Bacteria: Negative        RADIOLOGY & ADDITIONAL STUDIES:    Assesment:

## 2019-11-04 NOTE — PHYSICAL THERAPY INITIAL EVALUATION ADULT - PASSIVE RANGE OF MOTION EXAMINATION, REHAB EVAL
PROM grossly WFL t/o, pt c/o pain to R calf with palpation/ROM./no Passive ROM deficits were identified

## 2019-11-04 NOTE — DIETITIAN INITIAL EVALUATION ADULT. - OTHER INFO
P/w: Shortness of breath, leg pain. Acute on Chronic systolic CHF with low EF - continue mild diuresis. Fluid overload - BiPAP. Leg pain - possible cellulitis - vanco. DM - high glucose - on insulin drip. Enlarged gall bladder - ( patient asymptomatic ) sono showed sludge. RIC- monitoring.

## 2019-11-04 NOTE — PHYSICAL THERAPY INITIAL EVALUATION ADULT - MANUAL MUSCLE TESTING RESULTS, REHAB EVAL
Grossly 4+/5 t/o except R hip flexion and R knee ext/R ankle DF 3-/5. Pt states she had RLE weakness since prior to hospital admission.

## 2019-11-04 NOTE — PROGRESS NOTE ADULT - ASSESSMENT
PROBLEMS:  I spent 45 minutes of critical care time examining patient, reviewing vitals, labs, medications, imaging and discussing with the team goals of care to prevent life-threatening in this patient who is at high risk for deterioration or death due to:    1.	Acute on Chronic systolic CHF with low EF - continue mild diuresis, Bumex 1 mg daily ..start coeg  and isordil 5 q8 and hydralazin as tolelated  2.	fluid overload - BiPAP, continue diuresis IV   3.	hyperkalemia - monitor bmp   4.	leg pain - possible cellulitis - vanco and cefepine  5.	RIC - monitor bmp  6.	enlarge gall bladder - ( patient asymptomatic ) sono showed sludge  7.	DM - high glucose - on insulin drip  8.	DVT / PUD prophylaxis   9 [place] on ct atb Vanco and Cefpim  PLAN  Neuro: move all 4 extremities. no sensory or motor deficits  Pain management.   ALPRAZolam 0.25 milliGRAM(s) Oral two times a day PRN  gabapentin 100 milliGRAM(s) Oral two times a day  morphine  - Injectable 2 milliGRAM(s) IV Push every 5 minutes PRN  QUEtiapine 25 milliGRAM(s) Oral at bedtime    Pulm: Wean off supplemental oxygen as able. Daily CXR. Encourage coughing, deep breathing and use of incentive spirometry.   tiotropium 18 MICROgram(s) Capsule 1 Capsule(s) Inhalation daily    Cardio: Monitor telemetry/alarms. Continue supportive care   aMIOdarone    Tablet 200 milliGRAM(s) Oral daily  buMETAnide 1 milliGRAM(s) Oral daily  carvedilol 3.125 milliGRAM(s) Oral every 12 hours  milrinone Infusion 0.375 MICROgram(s)/kG/Min IV Continuous <Continuous>  sacubitril 24 mG/valsartan 26 mG 1 Tablet(s) Oral two times a day  tamsulosin Oral Tab/Cap - Peds 0.4 milliGRAM(s) Oral at bedtime    GI: Continue stool softeners.    pantoprazole    Tablet 40 milliGRAM(s) Oral before breakfast    Nutrition: Continue present diet  Endocrine and glucose control:   atorvastatin 40 milliGRAM(s) Oral at bedtime  dextrose 40% Gel 15 Gram(s) Oral once PRN  dextrose 50% Injectable 12.5 Gram(s) IV Push once  dextrose 50% Injectable 25 Gram(s) IV Push once  dextrose 50% Injectable 25 Gram(s) IV Push once  glucagon  Injectable 1 milliGRAM(s) IntraMuscular once PRN  insulin regular Infusion 1 Unit(s)/Hr IV Continuous <Continuous>    Renal: monitor urine output, supplement electrolytes as needed,     Vasc: Heparin SC and/or SCDs for DVT prophylaxis  aspirin  chewable 81 milliGRAM(s) Oral daily  heparin  Injectable 5000 Unit(s) SubCutaneous every 8 hours    ID: Stable, no fever , no chills.   cefepime   IVPB 1000 milliGRAM(s) IV Intermittent every 12 hours  vancomycin  IVPB 1000 milliGRAM(s) IV Intermittent once    Therapy: OOB/ambulate  Disposition: start planing discharge home or placement    Pertinent clinical, laboratory, radiographic, hemodynamic, echocardiographic, respiratory data, microbiologic data and chart were reviewed and analyzed frequently throughout the course of the day and night. GI and DVT prophylaxis, glycemic control, head of bed elevation and skin care issues were addressed.  Patient seen, examined and plan discussed with CT Surgery / CTICU team during rounds.    [x ] The patient remains in critical and unstable condition, and requires ICU care and monitoring  [ ] The patient is improving but requires continued monitoring and adjustment of therapy

## 2019-11-04 NOTE — PHYSICAL THERAPY INITIAL EVALUATION ADULT - LEVEL OF INDEPENDENCE: GAIT, REHAB EVAL
Unable to safely initiate gait as pt c/o severe R lower leg pain upon taking a step forward and attempting weight bearing, even with PT assist and use of RW. Will hold at this time until. As per CHACHA Shi, another R venous duplex will be ordered for pt./unable to perform

## 2019-11-04 NOTE — PHYSICAL THERAPY INITIAL EVALUATION ADULT - PERTINENT HX OF CURRENT PROBLEM, REHAB EVAL
67 y/o F with h/o CAD s/p CABG, ICM, chronic systolic heart failure with EF ~ 25% coming today with rash in RLE and pain. Patient can't bear any weight. The pain started suddenly. She also notes some SOB but this is not that significant compared to her baseline. She can walk minimally around the house before getting SOB.

## 2019-11-04 NOTE — DIETITIAN INITIAL EVALUATION ADULT. - CONTINUE CURRENT NUTRITION CARE PLAN
Continue consistent carb diet order add evening snack, and DASH/TLC modifier/yes yes/Continue consistent carb diet order add evening snack, and DASH/TLC, no conc K modifier

## 2019-11-04 NOTE — DIETITIAN INITIAL EVALUATION ADULT. - DIET TYPE
Pt. known to RD from previous prolonged [facility] stay. Pt. with frequent hospitalizations recently, currently with very good appetite. Previous admission with poor PO intake, supplemented with Glucerna, also followed by NST for part of LOS. In between admits pt. reports decreased appetite d/t SOB and feeling ill. No cultural/Amish dietary restr. Glucerna shakes occasionally PTP. Pt. endorses some weight loss, however weights appear stable as compared to previous admit. Pt. known to RD from previous prolonged [facility] stay. Pt. with frequent hospitalizations recently, currently with very good appetite. Previous admission with poor PO intake, supplemented with Glucerna-likes vanilla only, also followed by NST for part of LOS. In between admits pt. reports decreased appetite d/t SOB and feeling ill. No cultural/Cheondoism dietary restr. Glucerna shakes occasionally PTP. Pt. endorses some weight loss, however weights appear stable as compared to previous admit.

## 2019-11-04 NOTE — PHYSICAL THERAPY INITIAL EVALUATION ADULT - WEIGHT-BEARING RESTRICTIONS: SIT/STAND, REHAB EVAL
Pt utilizing PWB to RLE as pt c/o 10/10 R calf pain when attempting full weight bearing./partial weight-bearing

## 2019-11-05 LAB
ALBUMIN SERPL ELPH-MCNC: 2.9 G/DL — LOW (ref 3.5–5.2)
ALP SERPL-CCNC: 116 U/L — HIGH (ref 30–115)
ALT FLD-CCNC: 8 U/L — SIGNIFICANT CHANGE UP (ref 0–41)
ANION GAP SERPL CALC-SCNC: 11 MMOL/L — SIGNIFICANT CHANGE UP (ref 7–14)
AST SERPL-CCNC: 18 U/L — SIGNIFICANT CHANGE UP (ref 0–41)
BILIRUB SERPL-MCNC: <0.2 MG/DL — SIGNIFICANT CHANGE UP (ref 0.2–1.2)
BUN SERPL-MCNC: 51 MG/DL — HIGH (ref 10–20)
CALCIUM SERPL-MCNC: 9 MG/DL — SIGNIFICANT CHANGE UP (ref 8.5–10.1)
CHLORIDE SERPL-SCNC: 97 MMOL/L — LOW (ref 98–110)
CO2 SERPL-SCNC: 29 MMOL/L — SIGNIFICANT CHANGE UP (ref 17–32)
CREAT SERPL-MCNC: 1.2 MG/DL — SIGNIFICANT CHANGE UP (ref 0.7–1.5)
ESTIMATED AVERAGE GLUCOSE: 117 MG/DL — HIGH (ref 68–114)
ESTIMATED AVERAGE GLUCOSE: 120 MG/DL — HIGH (ref 68–114)
GAS PNL BLDA: SIGNIFICANT CHANGE UP
GLUCOSE BLDC GLUCOMTR-MCNC: 110 MG/DL — HIGH (ref 70–99)
GLUCOSE BLDC GLUCOMTR-MCNC: 114 MG/DL — HIGH (ref 70–99)
GLUCOSE BLDC GLUCOMTR-MCNC: 139 MG/DL — HIGH (ref 70–99)
GLUCOSE BLDC GLUCOMTR-MCNC: 167 MG/DL — HIGH (ref 70–99)
GLUCOSE SERPL-MCNC: 115 MG/DL — HIGH (ref 70–99)
HBA1C BLD-MCNC: 5.7 % — HIGH (ref 4–5.6)
HBA1C BLD-MCNC: 5.8 % — HIGH (ref 4–5.6)
HCT VFR BLD CALC: 26 % — LOW (ref 37–47)
HGB BLD-MCNC: 8 G/DL — LOW (ref 12–16)
MAGNESIUM SERPL-MCNC: 2 MG/DL — SIGNIFICANT CHANGE UP (ref 1.8–2.4)
MCHC RBC-ENTMCNC: 27.6 PG — SIGNIFICANT CHANGE UP (ref 27–31)
MCHC RBC-ENTMCNC: 30.8 G/DL — LOW (ref 32–37)
MCV RBC AUTO: 89.7 FL — SIGNIFICANT CHANGE UP (ref 81–99)
NRBC # BLD: 0 /100 WBCS — SIGNIFICANT CHANGE UP (ref 0–0)
PLATELET # BLD AUTO: 195 K/UL — SIGNIFICANT CHANGE UP (ref 130–400)
POTASSIUM SERPL-MCNC: 4.8 MMOL/L — SIGNIFICANT CHANGE UP (ref 3.5–5)
POTASSIUM SERPL-SCNC: 4.8 MMOL/L — SIGNIFICANT CHANGE UP (ref 3.5–5)
PROT SERPL-MCNC: 5.3 G/DL — LOW (ref 6–8)
RBC # BLD: 2.9 M/UL — LOW (ref 4.2–5.4)
RBC # FLD: 16.8 % — HIGH (ref 11.5–14.5)
SODIUM SERPL-SCNC: 137 MMOL/L — SIGNIFICANT CHANGE UP (ref 135–146)
WBC # BLD: 5.3 K/UL — SIGNIFICANT CHANGE UP (ref 4.8–10.8)
WBC # FLD AUTO: 5.3 K/UL — SIGNIFICANT CHANGE UP (ref 4.8–10.8)

## 2019-11-05 PROCEDURE — 71045 X-RAY EXAM CHEST 1 VIEW: CPT | Mod: 26,77

## 2019-11-05 PROCEDURE — 99233 SBSQ HOSP IP/OBS HIGH 50: CPT

## 2019-11-05 PROCEDURE — 71045 X-RAY EXAM CHEST 1 VIEW: CPT | Mod: 26

## 2019-11-05 PROCEDURE — 31645 BRNCHSC W/THER ASPIR 1ST: CPT | Mod: 58

## 2019-11-05 RX ORDER — ACETAZOLAMIDE 250 MG/1
500 TABLET ORAL DAILY
Refills: 0 | Status: DISCONTINUED | OUTPATIENT
Start: 2019-11-05 | End: 2019-11-08

## 2019-11-05 RX ORDER — LIDOCAINE HCL 20 MG/ML
20 VIAL (ML) INJECTION ONCE
Refills: 0 | Status: COMPLETED | OUTPATIENT
Start: 2019-11-05 | End: 2019-11-05

## 2019-11-05 RX ORDER — MIDAZOLAM HYDROCHLORIDE 1 MG/ML
2 INJECTION, SOLUTION INTRAMUSCULAR; INTRAVENOUS ONCE
Refills: 0 | Status: DISCONTINUED | OUTPATIENT
Start: 2019-11-05 | End: 2019-11-05

## 2019-11-05 RX ORDER — BUMETANIDE 0.25 MG/ML
1 INJECTION INTRAMUSCULAR; INTRAVENOUS ONCE
Refills: 0 | Status: COMPLETED | OUTPATIENT
Start: 2019-11-05 | End: 2019-11-05

## 2019-11-05 RX ORDER — FENTANYL CITRATE 50 UG/ML
25 INJECTION INTRAVENOUS ONCE
Refills: 0 | Status: DISCONTINUED | OUTPATIENT
Start: 2019-11-05 | End: 2019-11-05

## 2019-11-05 RX ORDER — ALBUMIN HUMAN 25 %
500 VIAL (ML) INTRAVENOUS ONCE
Refills: 0 | Status: COMPLETED | OUTPATIENT
Start: 2019-11-05 | End: 2019-11-05

## 2019-11-05 RX ORDER — FENTANYL CITRATE 50 UG/ML
50 INJECTION INTRAVENOUS ONCE
Refills: 0 | Status: DISCONTINUED | OUTPATIENT
Start: 2019-11-05 | End: 2019-11-05

## 2019-11-05 RX ADMIN — METFORMIN HYDROCHLORIDE 1000 MILLIGRAM(S): 850 TABLET ORAL at 17:47

## 2019-11-05 RX ADMIN — ZINC SULFATE TAB 220 MG (50 MG ZINC EQUIVALENT) 220 MILLIGRAM(S): 220 (50 ZN) TAB at 14:44

## 2019-11-05 RX ADMIN — CEFEPIME 100 MILLIGRAM(S): 1 INJECTION, POWDER, FOR SOLUTION INTRAMUSCULAR; INTRAVENOUS at 05:38

## 2019-11-05 RX ADMIN — ISOSORBIDE DINITRATE 10 MILLIGRAM(S): 5 TABLET ORAL at 05:39

## 2019-11-05 RX ADMIN — BUMETANIDE 1 MILLIGRAM(S): 0.25 INJECTION INTRAMUSCULAR; INTRAVENOUS at 05:38

## 2019-11-05 RX ADMIN — ATORVASTATIN CALCIUM 40 MILLIGRAM(S): 80 TABLET, FILM COATED ORAL at 22:16

## 2019-11-05 RX ADMIN — FENTANYL CITRATE 25 MICROGRAM(S): 50 INJECTION INTRAVENOUS at 11:35

## 2019-11-05 RX ADMIN — HEPARIN SODIUM 5000 UNIT(S): 5000 INJECTION INTRAVENOUS; SUBCUTANEOUS at 22:16

## 2019-11-05 RX ADMIN — SENNA PLUS 1 TABLET(S): 8.6 TABLET ORAL at 17:48

## 2019-11-05 RX ADMIN — Medication 250 MILLIGRAM(S): at 17:47

## 2019-11-05 RX ADMIN — CEFEPIME 100 MILLIGRAM(S): 1 INJECTION, POWDER, FOR SOLUTION INTRAMUSCULAR; INTRAVENOUS at 17:47

## 2019-11-05 RX ADMIN — TAMSULOSIN HYDROCHLORIDE 0.4 MILLIGRAM(S): 0.4 CAPSULE ORAL at 22:16

## 2019-11-05 RX ADMIN — Medication 250 MILLIGRAM(S): at 05:38

## 2019-11-05 RX ADMIN — METFORMIN HYDROCHLORIDE 1000 MILLIGRAM(S): 850 TABLET ORAL at 05:39

## 2019-11-05 RX ADMIN — ISOSORBIDE DINITRATE 10 MILLIGRAM(S): 5 TABLET ORAL at 14:44

## 2019-11-05 RX ADMIN — SENNA PLUS 1 TABLET(S): 8.6 TABLET ORAL at 05:40

## 2019-11-05 RX ADMIN — HEPARIN SODIUM 5000 UNIT(S): 5000 INJECTION INTRAVENOUS; SUBCUTANEOUS at 05:38

## 2019-11-05 RX ADMIN — Medication 10 MILLIGRAM(S): at 14:44

## 2019-11-05 RX ADMIN — Medication 20 MILLILITER(S): at 09:18

## 2019-11-05 RX ADMIN — Medication 10 MILLIGRAM(S): at 05:40

## 2019-11-05 RX ADMIN — SODIUM CHLORIDE 3 MILLILITER(S): 9 INJECTION INTRAMUSCULAR; INTRAVENOUS; SUBCUTANEOUS at 22:20

## 2019-11-05 RX ADMIN — FENTANYL CITRATE 25 MICROGRAM(S): 50 INJECTION INTRAVENOUS at 09:10

## 2019-11-05 RX ADMIN — BUMETANIDE 1 MILLIGRAM(S): 0.25 INJECTION INTRAMUSCULAR; INTRAVENOUS at 16:01

## 2019-11-05 RX ADMIN — Medication 3 MILLILITER(S): at 14:37

## 2019-11-05 RX ADMIN — Medication 81 MILLIGRAM(S): at 14:44

## 2019-11-05 RX ADMIN — Medication 10 MILLIGRAM(S): at 22:16

## 2019-11-05 RX ADMIN — Medication 250 MILLILITER(S): at 21:12

## 2019-11-05 RX ADMIN — SODIUM CHLORIDE 3 MILLILITER(S): 9 INJECTION INTRAMUSCULAR; INTRAVENOUS; SUBCUTANEOUS at 00:00

## 2019-11-05 RX ADMIN — MIDAZOLAM HYDROCHLORIDE 2 MILLIGRAM(S): 1 INJECTION, SOLUTION INTRAMUSCULAR; INTRAVENOUS at 09:17

## 2019-11-05 RX ADMIN — Medication 3 MILLILITER(S): at 19:52

## 2019-11-05 RX ADMIN — ACETAZOLAMIDE 110 MILLIGRAM(S): 250 TABLET ORAL at 11:33

## 2019-11-05 RX ADMIN — SODIUM CHLORIDE 3 MILLILITER(S): 9 INJECTION INTRAMUSCULAR; INTRAVENOUS; SUBCUTANEOUS at 14:52

## 2019-11-05 RX ADMIN — CARVEDILOL PHOSPHATE 6.25 MILLIGRAM(S): 80 CAPSULE, EXTENDED RELEASE ORAL at 05:39

## 2019-11-05 RX ADMIN — AMIODARONE HYDROCHLORIDE 200 MILLIGRAM(S): 400 TABLET ORAL at 05:40

## 2019-11-05 RX ADMIN — Medication 650 MILLIGRAM(S): at 05:40

## 2019-11-05 RX ADMIN — ISOSORBIDE DINITRATE 10 MILLIGRAM(S): 5 TABLET ORAL at 22:16

## 2019-11-05 RX ADMIN — PANTOPRAZOLE SODIUM 40 MILLIGRAM(S): 20 TABLET, DELAYED RELEASE ORAL at 05:39

## 2019-11-05 RX ADMIN — Medication 3 MILLILITER(S): at 07:49

## 2019-11-05 RX ADMIN — Medication 500 MILLIGRAM(S): at 14:44

## 2019-11-05 RX ADMIN — HEPARIN SODIUM 5000 UNIT(S): 5000 INJECTION INTRAVENOUS; SUBCUTANEOUS at 14:44

## 2019-11-05 NOTE — CONSULT NOTE ADULT - SUBJECTIVE AND OBJECTIVE BOX
GUS WILBURN  68y, Female  Allergy: No Known Allergies      CHIEF COMPLAINT: Shortness of breath, leg pain (2019 08:27)      HPI:  69 y/o F with h/o CAD s/p CABG, ICM, chronic systolic heart failure with EF ~ 25% coming today with rash in RLE and pain. Patient can't bear any weight. The pain started suddenly. She also notes some SOB but this is not that significant compared to her baseline. She can walk minimally around the house before getting SOB. She denies PND or orthopnea but has pedal edema. No LOC, palpitations, bleeding episodes. (2019 07:05)    FAMILY HISTORY:  FH: myocardial infarction  FH: stomach cancer    PAST MEDICAL & SURGICAL HISTORY:  Female bladder prolapse  Diabetes  History of repair of hip fracture      Substance Use (x  ) never used  (  ) IVDU (  ) Other:  Tobacco Usage:  (x   ) never smoked   (   ) former smoker   (   ) current smoker   Alcohol Usage: (   ) social  (   ) daily use ( x  ) denies  Sexual History: NA      ROS  General: Denies fevers, chills, nightsweats, weight loss  HEENT: Denies headache, rhinorrhea, sore throat, eye pain  CV: Denies CP, palpitations  PULM: Denies SOB, cough  GI: Denies abdominal pain, diarrhea  : Denies dysuria, hematuria  MSK: Denies arthralgias  SKIN: Denies rash   NEURO: Denies paresthesias, weakness  PSYCH: Denies depression    VITALS:  T(F): 96.3, Max: 97.2 (19 @ 20:00)  HR: 59  BP: 112/58  RR: 16Vital Signs Last 24 Hrs  T(C): 35.7 (2019 12:00), Max: 36.2 (2019 20:00)  T(F): 96.3 (2019 12:00), Max: 97.2 (2019 20:00)  HR: 59 (2019 14:00) (58 - 83)  BP: 112/58 (2019 14:00) (86/48 - 119/59)  BP(mean): 82 (2019 14:00) (61 - 85)  RR: 16 (2019 14:00) (16 - 18)  SpO2: 100% (2019 14:20) (95% - 100%)    PHYSICAL EXAM:  Gen: NAD, resting in bed  HEENT: Normocephalic, atraumatic  Neck: supple, no lymphadenopathy  CV: Regular rate & regular rhythm  Lungs: decreased BS at bases, no fremitus  Abdomen: Soft, BS present  Ext: Warm, well perfused  Neuro: non focal, awake  Skin: no rash, no erythema    TESTS & MEASUREMENTS:                        8.0    5.30  )-----------( 195      ( 2019 01:00 )             26.0         137  |  97<L>  |  51<H>  ----------------------------<  115<H>  4.8   |  29  |  1.2    Ca    9.0      2019 01:00  Mg     2.0         TPro  5.3<L>  /  Alb  2.9<L>  /  TBili  <0.2  /  DBili  x   /  AST  18  /  ALT  8   /  AlkPhos  116<H>      eGFR if Non African American: 47 mL/min/1.73M2 (19 @ 01:00)  eGFR if African American: 55 mL/min/1.73M2 (19 @ 01:00)    LIVER FUNCTIONS - ( 2019 01:00 )  Alb: 2.9 g/dL / Pro: 5.3 g/dL / ALK PHOS: 116 U/L / ALT: 8 U/L / AST: 18 U/L / GGT: x                     INFECTIOUS DISEASES TESTING  MRSA PCR Result.: Negative (10-06-19 @ 20:00)      RADIOLOGY & ADDITIONAL TESTS:  I have personally reviewed the last Chest xray  CXR  Xray Chest 1 View AP/PA:   EXAM:  XR CHEST FRONTAL 1V            PROCEDURE DATE:  2019            INTERPRETATION:  Clinical History / Reason for exam: Dyspnea    Comparison : Chest radiograph 2019.    Technique/Positioning: Frontal.    Findings:    Support devices: Central venous line superior vena cava    Cardiac/mediastinum/hilum: Indeterminate    Lung parenchyma/Pleura: Bilateral lower lobe opacity/pleural effusion no   change. No air leak.    Skeleton/soft tissues: Unremarkable.    Impression:      Bilateral lower lobe opacity/pleural effusion no change. No air leak.                      PAULINE CAMOP M.D., ATTENDING RADIOLOGIST  This document has been electronically signed. Nov  3 2019  2:08PM             (19 @ 05:16)  Xray Chest 1 View- PORTABLE-Urgent:   EXAM:  XR CHEST PORTABLE URGENT 1V            PROCEDURE DATE:  2019            INTERPRETATION:  Clinical History / Reason for exam: Dyspnea    Comparison : Chest radiograph 2019.    Technique/Positioning: Frontal.    Findings:    Supportdevices: Central venous line superior vena cava    Cardiac/mediastinum/hilum: Indeterminant    Lung parenchyma/Pleura: Bilateral lower lobe opacity/pleural effusion no   change. No air leak.    Skeleton/soft tissues: Unremarkable.    Impression:      Bilateral lower lobe opacity/pleural effusion no change. No air leak.                      PAULINE CAMPO M.D., ATTENDING RADIOLOGIST  This document has been electronically signed. Nov  3 2019  2:10PM             (19 @ 20:02)      CT  CT Chest No Cont:   EXAM:  CT CHEST            PROCEDURE DATE:  2019            INTERPRETATION:  Reason for Exam:  CHF, status post central line   placement.    Technique:  CT of the chest was performed from the thoracic inlet to the level of the   adrenal glands without contrast injection. Coronal and sagittal images   have been submitted. MIP images were created    Comparison: CT chest 2019.    Findings:     Tubes/Lines: Right-sided central line terminating in the distal SVC.    Lungs, Pleura, and Airways:Patent proximal tracheobronchial tree. Small   to moderate right-sided pleural effusion and small left pleural effusion.   Dense consolidative opacity in the right lower lobe and right middle   lobe, and subsegmental atelectasis left lower lobe. Bilateral   subsegmental atelectasis     Mediastinum/Lymph Nodes: Visualized thyroid is unremarkable. No evidence   of mediastinal or hilar lymphadenopathy.    Heart/Great Vessels: Coronary artery and thoracic aorta calcification.   Cardiomegaly. Leftatrial appendage occlusion device. Great vessels are   of normal caliber. No pericardial effusion.    Abdomen: Stable partially calcified splenic artery aneurysm measuring 1.5   cm. Otherwise partially visualized upper abdomen shows no acute pathology.    Bones and soft tissues: Status post sternotomy.    IMPRESSION:    Small to moderate right-sided pleural effusion and small left pleural   effusion.    Dense consolidative opacity in the right lower lobe and right middle   lobe, and subsegmental atelectasis left lower lobe. Differential includes   CHF versus pneumonia.    No pneumothorax.    Right-sided central line terminating in the lower SVC.                    NASSIER HARFOUCH M.D., RESIDENT RADIOLOGIST  This document has been electronically signed.  CHEYANNE ARANDA M.D., ATTENDING RADIOLOGIST  This document has been electronically signed. Nov  3 2019 11:43AM             (19 @ 21:12)      CARDIOLOGY TESTING  Transthoracic Echocardiogram:    EXAM:  2-D ECHO (TTE) COMPLETE        PROCEDURE DATE:  2019      INTERPRETATION:  REPORT:    TRANSTHORACIC ECHOCARDIOGRAM REPORT         Patient Name:   GUS WILBURN Accession #: [id number]  Medical Rec #:  [id number]    Height:      63.0 in 160.0 cm  YOB: 1950  Weight:      124.0 lb 56.25 kg  Patient Age:    68 years    BSA:         1.58 m²  Patient Gender: F           BP:          116/55 mmHg       Date of Exam:        2019 7:18:13 AM  Referring Physician: [id number] ERNESTO CATALAN  Sonographer:         Cheyanne Polk  Reading Physician:   Spring Arrington M.D.    Procedure:   2D Echo/Doppler/Color Doppler Complete.  Indications: I50.9 - Heart Failure, unspecified  Diagnosis:   Heart failure, unspecified - I50.9         Summary:   1. Severely decreased global left ventricular systolic function.   2. LV Ejection Fraction by Renner's Method with a biplane EF of 27 %.   3. Increased LV wall thickness.   4. Normal left ventricular internal cavity size.   5. Normal right atrial size.   6. Small pericardial effusion.   7. Mild thickening and calcification of the anterior and posterior   mitral valve leaflets.   8. Mild to moderate mitral annular calcification.   9. Moderate aortic valve stenosis.    PHYSICIAN INTERPRETATION:  Left Ventricle: The left ventricular internal cavity size is normal. Left   ventricular wall thickness is increased. Global LV systolic function was   severely decreased.  Right Ventricle: The right ventricular size is normal. RV systolic   function is normal.  Left Atrium: Mildly enlarged left atrium.  Right Atrium: Normal right atrial size.  Pericardium: A small pericardial effusion is present.  Mitral Valve: Mild thickening and calcification of the anterior and   posterior mitral valve leaflets. There is mild to moderate mitral annular   calcification. Moderate mitral valve regurgitation is seen.  Tricuspid Valve: The tricuspid valve is normal in structure. Mild   tricuspid regurgitation is visualized.  Aortic Valve: The aortic valve is trileaflet. Moderate aortic stenosis is   present. Peak transaortic gradient equals 23.6 mmHg, mean transaortic   gradient equals 9.1 mmHg, the calculated aortic valve area equals 1.18   cm2 No evidence of aortic valve regurgitation is seen.  PulmonicValve: The pulmonic valve is thickened with good excursion.   Trace pulmonic valve regurgitation.  Aorta: The aortic root is normal in size and structure.  Venous: The inferior vena cava was dilated, with respiratory size   variation less than 50%.      2D AND M-MODE MEASUREMENTS (normal ranges within parentheses):  Left                  Normal   Aorta/Left             Normal  Ventricle:                     Atrium:  IVSd (2D):  0.97 cm  (0.7-1.1) AoV Cusp       1.31  (1.5-2.6)  LVPWd (2D): 1.16 cm  (0.7-1.1) Separation:     cm  LVIDd (2D): 4.13 cm  (3.4-5.7) Left Atrium    4.38  (1.9-4.0)  LVIDs (2D): 3.64 cm            (Mmode):        cm  LV FS (2D):  11.8 %   (>25%)   LA Volume      54.2  IVSd        1.23 cm  (0.7-1.1) Index         ml/m²  (Mmode):                       Right  LVPWd       1.10 cm  (0.7-1.1) Ventricle:  (Mmode):                       RVd (2D):      2.83 cm  LVIDd       4.71 cm  (3.4-5.7)  (Mmode):  LVIDs       3.90 cm  (Mmode):  LV FS        17.0 %   (>25%)  (Mmode):  Relative      0.56    (<0.42)  Wall  Thickness  Rel. Wall     0.47    (<0.42)  Thickness  Mm  LV Mass      129.5  Index:        g/m²  Mmode    SPECTRAL DOPPLER ANALYSIS:  LV DIASTOLIC FUNCTION:  MV Peak E: 1.22 m/s Decel Time: 161 msec  MV Peak A: 0.99m/s  E/A Ratio: 1.24    Aortic Valve:  AoV VMax:    2.43 m/s  AoV Area, Vmax: 1.24 cm² Vmax Indx: 0.79 cm²/m²  AoV VTI:     0.39 m    AoV Area, VTI:  1.18 cm² VTI Indx:  0.75 cm²/m²  AoV Pk Grad: 23.6 mmHg  AoV Mn Grad: 9.1 mmHg    LVOT Vmax: 1.03 m/s  LVOT VTI:  0.16 m  LVOT Diam: 1.93 cm    Mitral Valve:  MV P1/2 Time: 46.68 msec  MV Area, PHT: 4.71 cm²    Tricuspid Valve and PA/RV Systolic Pressure: TR Max Velocity: 2.66 m/s RA   Pressure:  RVSP/PASP: 29.9 mmHg    Pulmonic Valve:  PV Max Velocity: 1.24 m/s PV Max P.2 mmHg PV Mean PG:       [id number] Spring Arrington M.D., Electronically signed on 2019 at 9:01:17   AM              *** Final ***                    SPRING ARRINGTON MD  This document has been electronically signed. 2019  7:18AM             (19 @ 07:18)  12 Lead ECG:   Ventricular Rate 88 BPM    Atrial Rate 88 BPM    P-R Interval 190 ms    QRS Duration 124 ms    Q-T Interval 386 ms    QTC Calculation(Bezet) 467 ms    P Axis 69 degrees    R Axis 165 degrees    T Axis 42 degrees    Diagnosis Line Normal sinus rhythm  Left posterior fascicular block  ST & T wave abnormality, consider inferolateral ischemia  Abnormal ECG    Confirmed by Jovany Dan ([id number]) on 10/31/2019 11:07:01 PM (10-31-19 @ 21:51)      MEDICATIONS  acetaZOLAMIDE  IVPB 500  albuterol/ipratropium for Nebulization 3  aMIOdarone    Tablet 200  ascorbic acid 500  aspirin  chewable 81  atorvastatin 40  buMETAnide 1  buMETAnide Injectable 1  carvedilol 6.25  cefepime   IVPB 1000  dextrose 5%. 1000  dextrose 50% Injectable 12.5  dextrose 50% Injectable 25  dextrose 50% Injectable 25  heparin  Injectable 5000  hydrALAZINE 10  insulin glargine Injectable (LANTUS) 20  insulin lispro (HumaLOG) corrective regimen sliding scale   isosorbide   dinitrate Tablet (ISORDIL) 10  metFORMIN 1000  pantoprazole    Tablet 40  polyethylene glycol 3350 17  senna 1  sodium chloride 0.9% lock flush 3  tamsulosin Oral Tab/Cap - Peds 0.4  tiotropium 18 MICROgram(s) Capsule 1  vancomycin  IVPB 1000  vancomycin  IVPB   zinc sulfate 220      ANTIBIOTICS:  cefepime   IVPB 1000 milliGRAM(s) IV Intermittent every 12 hours  vancomycin  IVPB 1000 milliGRAM(s) IV Intermittent every 12 hours  vancomycin  IVPB          All available historical data has been reviewed

## 2019-11-05 NOTE — PROCEDURE NOTE - ADDITIONAL PROCEDURE DETAILS
plugging found in right lower lobe basilar segments.  this was removed and the lobe was lavaged and aspirated.  posterior basilar segment appeared somewhat fishmouthed or intrinsically collapsed, but opened with inspiration.

## 2019-11-05 NOTE — REVIEW OF SYSTEMS
[Recent Weight Loss (___ Lbs)] : recent [unfilled] ~Ulb weight loss [Fever] : no fever [Headache] : no headache [Chills] : no chills [Blurry Vision] : no blurred vision [Earache] : no earache [Abdominal Pain] : no abdominal pain [Nausea] : no nausea [Vomiting] : no vomiting [Dizziness] : no dizziness [Confusion] : no confusion was observed [Anxiety] : no anxiety

## 2019-11-05 NOTE — ASSESSMENT
[FreeTextEntry1] : 69 y/o F with h/o DM who was recently discharged from the hospital after 3v-CABG for triple vessel disease with severe LV dysfunction, moderate MR. Patient remains fluid overloaded and with very limited exercise tolerance.

## 2019-11-05 NOTE — CDI QUERY NOTE - NSCDIOTHERTXTBX2_GEN_ALL_CORE_FT
11/2>  68 yold female w/ worsened SOB and pain, swelling, rash to right LE       11/2 PN> Now her SOB got rapidly worst, not able to lay down, O2 Sats without O2 in low 80’s %. Pt has been on 40% to 5L O2 supplementation.    Admitted for  Right pleural effusion, cellulitis, RLL Pneumonia , % RIC, Acute on Chronic systolic CHF with low EF -, hyperkalemia ,    RX: Pulmonary  PN> supplemental oxygen , Daily CXR. Encourage coughing, deep breathing and use of incentive spirometry, tiotropium 18 MICROgram(s) Capsule Inhalation     In order to capture the severity of illness and risk of mortality, Please indicate if additional diagnosis is applicable reflective of above clinical findings    > Acute Hypoxic Resp Failure  > Acute on chronic hypoxic Resp Failure  > Chronic Resp Failure  > Hypoxemia Only  > Other, Please Specify  > Clinically unable to determine

## 2019-11-05 NOTE — PROCEDURAL SAFETY CHECKLIST WITH OR WITHOUT SEDATION - NSPOSTCOMMENTFT_GEN_ALL_CORE
V/S remained stable during procedure, no SOB noted. patient tolerated procedure well, no adverse reactions noted.

## 2019-11-05 NOTE — CDI QUERY NOTE - NSCDINOTEPOA_GEN_ALL_CORE_FT
Was the condition present on admission? If so, please document in the chart that “(the condition) was present on admission.”

## 2019-11-05 NOTE — PHYSICAL EXAM
[General Appearance - In No Acute Distress] : no acute distress [Normal Conjunctiva] : the conjunctiva exhibited no abnormalities [Normal Oral Mucosa] : normal oral mucosa [JVD Elevated _____cm] : JVD elevated [unfilled] ~U cm above clavicle [] : no respiratory distress [Respiration, Rhythm And Depth] : normal respiratory rhythm and effort [Abdomen Soft] : soft [Abdomen Tenderness] : non-tender [Oriented To Time, Place, And Person] : oriented to person, place, and time [Affect] : the affect was normal [FreeTextEntry1] : No crackles

## 2019-11-05 NOTE — PROGRESS NOTE ADULT - ASSESSMENT
PROBLEMS:  I spent 45 minutes of critical care time examining patient, reviewing vitals, labs, medications, imaging and discussing with the team goals of care to prevent life-threatening in this patient who is at high risk for deterioration or death due to:    1.	Acute on Chronic systolic CHF with low EF - hold diureis today cr going up  continue  coeg 6.25 q12 and isordil 10 q8 and hydralazin as tolelated  2.	fluid overload - BiPAP,  when needed on NC now diuresis    3.	hyperkalemia - monitor bmp   4.	leg pain - possible cellulitis -  cefepine   d/c vanco lyrica started yestrday ..less leg pain today   5.	RIC - monitor bmp  6.	enlarge gall bladder - ( patient asymptomatic ) sono showed sludge  7.	DM - high glucose - lantus   8.	DVT / PUD prophylaxis   9 P[place] on ct atb Vanco and Cefpim      Neuro: move all 4 extremities. no sensory or motor deficits  Pain management. Lyrica and tylanol    Pulm: Wean off supplemental oxygen as able. Daily CXR. Encourage coughing, deep breathing and use of incentive spirometry.   tiotropium 18 MICROgram(s) Capsule 1 Capsule(s) Inhalation daily    Cardio: Monitor telemetry/alarms. Continue supportive care   aMIOdarone    Tablet 200 milliGRAM(s) Oral daily  buMETAnide 1 milliGRAM(s) Oral daily  carvedilol 3.125 milliGRAM(s) Oral every 12 hours  milrinone Infusion 0.375 MICROgram(s)/kG/Min IV Continuous <Continuous>  sacubitril 24 mG/valsartan 26 mG 1 Tablet(s) Oral two times a day  tamsulosin Oral Tab/Cap - Peds 0.4 milliGRAM(s) Oral at bedtime    GI: Continue stool softeners.    pantoprazole    Tablet 40 milliGRAM(s) Oral before breakfast    Nutrition: Continue present diet  Endocrine and glucose control:   atorvastatin 40 milliGRAM(s) Oral at bedtime  dextrose 40% Gel 15 Gram(s) Oral once PRN  dextrose 50% Injectable 12.5 Gram(s) IV Push once  dextrose 50% Injectable 25 Gram(s) IV Push once  dextrose 50% Injectable 25 Gram(s) IV Push once  glucagon  Injectable 1 milliGRAM(s) IntraMuscular once PRN  insulin regular Infusion 1 Unit(s)/Hr IV Continuous <Continuous>    Renal: monitor urine output, supplement electrolytes as needed,     Vasc: Heparin SC and/or SCDs for DVT prophylaxis  aspirin  chewable 81 milliGRAM(s) Oral daily  heparin  Injectable 5000 Unit(s) SubCutaneous every 8 hours    ID: Stable, no fever , no chills.   cefepime   IVPB 1000 milliGRAM(s) IV Intermittent every 12 hours  vancomycin  IVPB 1000 milliGRAM(s) IV Intermittent once    Therapy: OOB/ambulate  Disposition: start planing discharge home or placement    Pertinent clinical, laboratory, radiographic, hemodynamic, echocardiographic, respiratory data, microbiologic data and chart were reviewed and analyzed frequently throughout the course of the day and night. GI and DVT prophylaxis, glycemic control, head of bed elevation and skin care issues were addressed.  Patient seen, examined and plan discussed with CT Surgery / CTICU team during rounds.    [x ] The patient remains in critical and unstable condition, and requires ICU care and monitoring  [ ] The patient is improving but requires continued monitoring and adjustment of therapy

## 2019-11-05 NOTE — PROGRESS NOTE ADULT - SUBJECTIVE AND OBJECTIVE BOX
CTU Attending Progress Daily Note     05 Nov 2019 08:28  POD# -   He has history of Female bladder prolapse  Diabetes    Interval event for past 24 hr:  GUS WILBURN  68y had no event.   Current Complains:  GUS WILBURN has no new complains  HPI:  67 y/o F with h/o CAD s/p CABG, ICM, chronic systolic heart failure with EF ~ 25% coming today with rash in RLE and pain. Patient can't bear any weight. The pain started suddenly. She also notes some SOB but this is not that significant compared to her baseline. She can walk minimally around the house before getting SOB. She denies PND or orthopnea but has pedal edema. No LOC, palpitations, bleeding episodes. (03 Nov 2019 07:05)    OBJECTIVE:  ICU Vital Signs Last 24 Hrs  T(C): 36.2 (04 Nov 2019 20:00), Max: 36.2 (04 Nov 2019 20:00)  T(F): 97.2 (04 Nov 2019 20:00), Max: 97.2 (04 Nov 2019 20:00)  HR: 63 (05 Nov 2019 07:00) (63 - 90)  BP: 110/54 (05 Nov 2019 07:00) (86/48 - 119/59)  BP(mean): 78 (05 Nov 2019 07:00) (61 - 85)  ABP: --  ABP(mean): --  RR: 18 (04 Nov 2019 15:00) (18 - 22)  SpO2: 99% (05 Nov 2019 07:00) (95% - 100%)    I&O's Summary    04 Nov 2019 07:01  -  05 Nov 2019 07:00  --------------------------------------------------------  IN: 1900 mL / OUT: 1115 mL / NET: 785 mL      I&O's Detail    04 Nov 2019 07:01  -  05 Nov 2019 07:00  --------------------------------------------------------  IN:    IV PiggyBack: 660 mL    Oral Fluid: 1240 mL  Total IN: 1900 mL    OUT:    Indwelling Catheter - Urethral: 1115 mL  Total OUT: 1115 mL    Total NET: 785 mL        Adult Advanced Hemodynamics Last 24 Hrs  CVP(mm Hg): --  CVP(cm H2O): --  CO: --  CI: --  PA: --  PA(mean): --  PCWP: --  SVR: --  SVRI: --  PVR: --  PVRI: --    CAPILLARY BLOOD GLUCOSE      POCT Blood Glucose.: 167 mg/dL (05 Nov 2019 06:46)  POCT Blood Glucose.: 197 mg/dL (04 Nov 2019 19:59)  POCT Blood Glucose.: 161 mg/dL (04 Nov 2019 16:38)  POCT Blood Glucose.: 181 mg/dL (04 Nov 2019 10:33)    LABS:  ABG - ( 05 Nov 2019 06:38 )  pH, Arterial: 7.37  pH, Blood: x     /  pCO2: 56    /  pO2: 89    / HCO3: 32    / Base Excess: 6.1   /  SaO2: 98                                      8.0    5.30  )-----------( 195      ( 05 Nov 2019 01:00 )             26.0     11-05    137  |  97<L>  |  51<H>  ----------------------------<  115<H>  4.8   |  29  |  1.2    Ca    9.0      05 Nov 2019 01:00  Mg     2.0     11-05    TPro  5.3<L>  /  Alb  2.9<L>  /  TBili  <0.2  /  DBili  x   /  AST  18  /  ALT  8   /  AlkPhos  116<H>  11-05          Home Medications:  acetaminophen 325 mg oral tablet: 2 tab(s) orally every 6 hours, As needed, Mild Pain (1 - 3) (18 Oct 2019 14:37)    HOSPITAL MEDICATIONS:  MEDICATIONS  (STANDING):  albuterol/ipratropium for Nebulization 3 milliLiter(s) Nebulizer every 6 hours  aMIOdarone    Tablet 200 milliGRAM(s) Oral daily  ascorbic acid 500 milliGRAM(s) Oral daily  aspirin  chewable 81 milliGRAM(s) Oral daily  atorvastatin 40 milliGRAM(s) Oral at bedtime  buMETAnide 1 milliGRAM(s) Oral daily  carvedilol 6.25 milliGRAM(s) Oral every 12 hours  cefepime   IVPB 1000 milliGRAM(s) IV Intermittent every 12 hours  dextrose 5%. 1000 milliLiter(s) (50 mL/Hr) IV Continuous <Continuous>  dextrose 50% Injectable 12.5 Gram(s) IV Push once  dextrose 50% Injectable 25 Gram(s) IV Push once  dextrose 50% Injectable 25 Gram(s) IV Push once  heparin  Injectable 5000 Unit(s) SubCutaneous every 8 hours  hydrALAZINE 10 milliGRAM(s) Oral every 8 hours  insulin glargine Injectable (LANTUS) 20 Unit(s) SubCutaneous every morning  insulin lispro (HumaLOG) corrective regimen sliding scale   SubCutaneous three times a day before meals  isosorbide   dinitrate Tablet (ISORDIL) 10 milliGRAM(s) Oral three times a day  metFORMIN 1000 milliGRAM(s) Oral every 12 hours  pantoprazole    Tablet 40 milliGRAM(s) Oral before breakfast  polyethylene glycol 3350 17 Gram(s) Oral every 24 hours  senna 1 Tablet(s) Oral every 12 hours  sodium chloride 0.9% lock flush 3 milliLiter(s) IV Push every 8 hours  tamsulosin Oral Tab/Cap - Peds 0.4 milliGRAM(s) Oral at bedtime  tiotropium 18 MICROgram(s) Capsule 1 Capsule(s) Inhalation daily  vancomycin  IVPB 1000 milliGRAM(s) IV Intermittent every 12 hours  vancomycin  IVPB      zinc sulfate 220 milliGRAM(s) Oral daily    MEDICATIONS  (PRN):  acetaminophen   Tablet .. 650 milliGRAM(s) Oral every 6 hours PRN Mild Pain (1 - 3)  dextrose 40% Gel 15 Gram(s) Oral once PRN Blood Glucose LESS THAN 70 milliGRAM(s)/deciliter  fentaNYL    Injectable 50 MICROGram(s) IV Push once PRN to bedside for procedure  glucagon  Injectable 1 milliGRAM(s) IntraMuscular once PRN Glucose LESS THAN 70 milligrams/deciliter  lidocaine 1% Injectable 20 milliLiter(s) Local Injection once PRN to bedside for procedure  midazolam Injectable 2 milliGRAM(s) IV Push once PRN to bedside for procedure      REVIEW OF SYSTEMS:  CONSTITUTIONAL: [X] all negative; [ ] weakness, [ ] fevers, [ ] chills  EYES/ENT: [X] all negative; [ ] visual changes, [ ] vertigo, [ ] throat pain   NECK: [X] all negative; [ ] pain, [ ] stiffness  RESPIRATORY: [] all negative, [ ] cough, [ ] wheezing, [ ] hemoptysis, [ ] shortness of breath  CARDIOVASCULAR: [] all negative; [ ] chest pain, [ ] palpitations, [ ] orthopnea  GASTROINTESTINAL: [X] all negative; [ ]abdominal pain, [ ] nausea, [ ] vomiting, [ ] hematemesis, [ ] diarrhea, [ ] constipation, [ ] melena, [ ] hematochezia.  GENITOURINARY: [X] all negative; [ ] dysuria, [ ] frequency, [ ] hematuria  NEUROLOGICAL: [X] all negative; [ ] numbness, [ ] weakness  SKIN: [X] all negative; [ ] itching, [ ] burning, [ ] rashes, [ ] lesions   All other review of systems is negative unless indicated above.    [  ] Unable to assess ROS because     PHYSICAL EXAM:          CONSTITUTIONAL: Well-developed; well-nourished; in no acute distress.   	SKIN: warm, dry  	HEAD: Normocephalic; atraumatic.  	EYES: PERRL, EOM, no conj injection, sclera clear  	ENT: No nasal discharge; airway clear.  	NECK: Supple; non tender.  No midline ttp ctls  	CARD: S1, S2 normal; no murmurs, gallops, or rubs. Regular rate and rhythm. 2+ RPs and DPs bilat, no carotid bruits, no pedal   edema, no calf pain b/l  	RESP: CTA  bilat good air movement No wheezes, rales or rhonchi.  	ABD: Soft, not tender, not distended, no CVA ttp no rebound or guarding, bowel sounds present  	EXT: Normal ROM.  No clubbing, cyanosis or edema.   	  	NEURO: Alert, awake, motor 5/5 R, 5/5 L        RADIOLOGY:  xray CTU Attending Progress Daily Note     05 Nov 2019 08:28    He has history of Female bladder prolapse  Diabetes    Interval event for past 24 hr:  GUS WILBURN  68y had no event.   Current Complains:  GUS WILBURN has no new complains  HPI:  69 y/o F with h/o CAD s/p CABG, ICM, chronic systolic heart failure with EF ~ 25% coming today with rash in RLE and pain. Patient can't bear any weight. The pain started suddenly. She also notes some SOB but this is not that significant compared to her baseline. She can walk minimally around the house before getting SOB. She denies PND or orthopnea but has pedal edema. No LOC, palpitations, bleeding episodes. (03 Nov 2019 07:05)    OBJECTIVE:  ICU Vital Signs Last 24 Hrs  T(C): 36.2 (04 Nov 2019 20:00), Max: 36.2 (04 Nov 2019 20:00)  T(F): 97.2 (04 Nov 2019 20:00), Max: 97.2 (04 Nov 2019 20:00)  HR: 63 (05 Nov 2019 07:00) (63 - 90)  BP: 110/54 (05 Nov 2019 07:00) (86/48 - 119/59)  BP(mean): 78 (05 Nov 2019 07:00) (61 - 85)  ABP: --  ABP(mean): --  RR: 18 (04 Nov 2019 15:00) (18 - 22)  SpO2: 99% (05 Nov 2019 07:00) (95% - 100%)    I&O's Summary    04 Nov 2019 07:01  -  05 Nov 2019 07:00  --------------------------------------------------------  IN: 1900 mL / OUT: 1115 mL / NET: 785 mL      I&O's Detail    04 Nov 2019 07:01  -  05 Nov 2019 07:00  --------------------------------------------------------  IN:    IV PiggyBack: 660 mL    Oral Fluid: 1240 mL  Total IN: 1900 mL    OUT:    Indwelling Catheter - Urethral: 1115 mL  Total OUT: 1115 mL    Total NET: 785 mL        Adult Advanced Hemodynamics Last 24 Hrs  CVP(mm Hg): --  CVP(cm H2O): --  CO: --  CI: --  PA: --  PA(mean): --  PCWP: --  SVR: --  SVRI: --  PVR: --  PVRI: --    CAPILLARY BLOOD GLUCOSE      POCT Blood Glucose.: 167 mg/dL (05 Nov 2019 06:46)  POCT Blood Glucose.: 197 mg/dL (04 Nov 2019 19:59)  POCT Blood Glucose.: 161 mg/dL (04 Nov 2019 16:38)  POCT Blood Glucose.: 181 mg/dL (04 Nov 2019 10:33)    LABS:  ABG - ( 05 Nov 2019 06:38 )  pH, Arterial: 7.37  pH, Blood: x     /  pCO2: 56    /  pO2: 89    / HCO3: 32    / Base Excess: 6.1   /  SaO2: 98                                      8.0    5.30  )-----------( 195      ( 05 Nov 2019 01:00 )             26.0     11-05    137  |  97<L>  |  51<H>  ----------------------------<  115<H>  4.8   |  29  |  1.2    Ca    9.0      05 Nov 2019 01:00  Mg     2.0     11-05    TPro  5.3<L>  /  Alb  2.9<L>  /  TBili  <0.2  /  DBili  x   /  AST  18  /  ALT  8   /  AlkPhos  116<H>  11-05          Home Medications:  acetaminophen 325 mg oral tablet: 2 tab(s) orally every 6 hours, As needed, Mild Pain (1 - 3) (18 Oct 2019 14:37)    HOSPITAL MEDICATIONS:  MEDICATIONS  (STANDING):  albuterol/ipratropium for Nebulization 3 milliLiter(s) Nebulizer every 6 hours  aMIOdarone    Tablet 200 milliGRAM(s) Oral daily  ascorbic acid 500 milliGRAM(s) Oral daily  aspirin  chewable 81 milliGRAM(s) Oral daily  atorvastatin 40 milliGRAM(s) Oral at bedtime  buMETAnide 1 milliGRAM(s) Oral daily  carvedilol 6.25 milliGRAM(s) Oral every 12 hours  cefepime   IVPB 1000 milliGRAM(s) IV Intermittent every 12 hours  dextrose 5%. 1000 milliLiter(s) (50 mL/Hr) IV Continuous <Continuous>  dextrose 50% Injectable 12.5 Gram(s) IV Push once  dextrose 50% Injectable 25 Gram(s) IV Push once  dextrose 50% Injectable 25 Gram(s) IV Push once  heparin  Injectable 5000 Unit(s) SubCutaneous every 8 hours  hydrALAZINE 10 milliGRAM(s) Oral every 8 hours  insulin glargine Injectable (LANTUS) 20 Unit(s) SubCutaneous every morning  insulin lispro (HumaLOG) corrective regimen sliding scale   SubCutaneous three times a day before meals  isosorbide   dinitrate Tablet (ISORDIL) 10 milliGRAM(s) Oral three times a day  metFORMIN 1000 milliGRAM(s) Oral every 12 hours  pantoprazole    Tablet 40 milliGRAM(s) Oral before breakfast  polyethylene glycol 3350 17 Gram(s) Oral every 24 hours  senna 1 Tablet(s) Oral every 12 hours  sodium chloride 0.9% lock flush 3 milliLiter(s) IV Push every 8 hours  tamsulosin Oral Tab/Cap - Peds 0.4 milliGRAM(s) Oral at bedtime  tiotropium 18 MICROgram(s) Capsule 1 Capsule(s) Inhalation daily  vancomycin  IVPB 1000 milliGRAM(s) IV Intermittent every 12 hours  vancomycin  IVPB      zinc sulfate 220 milliGRAM(s) Oral daily    MEDICATIONS  (PRN):  acetaminophen   Tablet .. 650 milliGRAM(s) Oral every 6 hours PRN Mild Pain (1 - 3)  dextrose 40% Gel 15 Gram(s) Oral once PRN Blood Glucose LESS THAN 70 milliGRAM(s)/deciliter  fentaNYL    Injectable 50 MICROGram(s) IV Push once PRN to bedside for procedure  glucagon  Injectable 1 milliGRAM(s) IntraMuscular once PRN Glucose LESS THAN 70 milligrams/deciliter  lidocaine 1% Injectable 20 milliLiter(s) Local Injection once PRN to bedside for procedure  midazolam Injectable 2 milliGRAM(s) IV Push once PRN to bedside for procedure      REVIEW OF SYSTEMS:  CONSTITUTIONAL: [X] all negative; [ ] weakness, [ ] fevers, [ ] chills  EYES/ENT: [X] all negative; [ ] visual changes, [ ] vertigo, [ ] throat pain   NECK: [X] all negative; [ ] pain, [ ] stiffness  RESPIRATORY: [] all negative, [ ] cough, [ ] wheezing, [ ] hemoptysis, [ ] shortness of breath  CARDIOVASCULAR: [] all negative; [ ] chest pain, [ ] palpitations, [ ] orthopnea  GASTROINTESTINAL: [X] all negative; [ ]abdominal pain, [ ] nausea, [ ] vomiting, [ ] hematemesis, [ ] diarrhea, [ ] constipation, [ ] melena, [ ] hematochezia.  GENITOURINARY: [X] all negative; [ ] dysuria, [ ] frequency, [ ] hematuria  NEUROLOGICAL: [X] all negative; [ ] numbness, [ ] weakness  SKIN: [X] all negative; [ ] itching, [ ] burning, [ ] rashes, [ ] lesions   All other review of systems is negative unless indicated above.    [  ] Unable to assess ROS because     PHYSICAL EXAM:          CONSTITUTIONAL: Well-developed; well-nourished; in no acute distress.   	SKIN: warm, dry  	HEAD: Normocephalic; atraumatic.  	EYES: PERRL, EOM, no conj injection, sclera clear  	ENT: No nasal discharge; airway clear.  	NECK: Supple; non tender.  No midline ttp ctls  	CARD: S1, S2 normal; no murmurs, gallops, or rubs. Regular rate and rhythm. 2+ RPs and DPs bilat, no carotid bruits, no pedal   edema, no calf pain b/l  	RESP: CTA  bilat good air movement No wheezes, rales or rhonchi.  	ABD: Soft, not tender, not distended, no CVA ttp no rebound or guarding, bowel sounds present  	EXT: Normal ROM.  No clubbing, cyanosis or edema.   	  	NEURO: Alert, awake, motor 5/5 R, 5/5 L        RADIOLOGY:  xray

## 2019-11-05 NOTE — CDI QUERY NOTE - NSCDIOTHERTXTBX_GEN_ALL_CORE_HH
11/2>  68 yold female w/  worsened SOB, pain and swelling, rash right LE     > PN Reflect> 11/2 PN> Now her SOB got rapidly worst, not able to lay down, O2 Sats with out O2 in low 80’s %  . Pt has been on 40% to 5L O2 supplementation .    Admitted for  Right pleural effusion, cellulitis, RLL Pneumonia , % RIC, Acute on Chronic systolic CHF with low EF -, hyperkalemia ,     Pulm:   supplemental oxygen , Daily CXR. Encourage coughing, deep breathing and use of incentive spirometry, tiotropium 18 MICROgram(s) Capsule  Inhalation       ABX: Cefepime, Vancomycin    In order to capture the severity of illness and risk of mortality, the DX of Pneumonia need further clarification.  ?	Suspected Gm Neg Pneumonia  ?	Suspected Gm Positive Pneumonia  ?	Other Type of Bacterial Pneumonia  ?	Clinically unable to determine

## 2019-11-05 NOTE — ADVANCED PRACTICE NURSE CONSULT - ASSESSMENT
bilateral stage 3 Ischial pressure injuries  Patient has multiple comorbidities and is non compliant for pressure relief   Daughter is engaged and appropriate  for home care

## 2019-11-05 NOTE — CDI QUERY NOTE - NSCDI_DOCCLARIFY2_GEN_ALL_CORE_FT
Documentation clarification is required for accuracy in coding and billing, claim validation and reporting severity of illness, quality data and risk of mortality.

## 2019-11-05 NOTE — CDI QUERY NOTE - NSCDINOTEDOCCLARIFICATION_GEN_A_CORE
PLEASE INCLUDE MORE SPECIFIC DOCUMENTATION IN YOUR PROGRESS NOTE AND DISCHARGE SUMMARY.  The documentation in this patient's medical record requires additional clarification to ensure that we accurately capture the patients diagnosis(es), treatment and/or severity of illness. Please document to the greatest level of specificity all corresponding diagnoses (either known or suspected) and/or treatment associated with the clinical information described below.

## 2019-11-05 NOTE — PROGRESS NOTE ADULT - SUBJECTIVE AND OBJECTIVE BOX
SUBJECTIVE ASSESSMENT:  pt is tired of being in the hospital    Vital Signs Last 24 Hrs  T(C): 36.2 (04 Nov 2019 20:00), Max: 36.7 (04 Nov 2019 08:00)  T(F): 97.2 (04 Nov 2019 20:00), Max: 98 (04 Nov 2019 08:00)  HR: 71 (05 Nov 2019 06:00) (64 - 95)  BP: 94/52 (05 Nov 2019 06:00) (86/48 - 121/65)  BP(mean): 71 (05 Nov 2019 06:00) (61 - 85)  RR: 18 (04 Nov 2019 15:00) (18 - 22)  SpO2: 97% (05 Nov 2019 06:00) (95% - 100%)  11-04-19 @ 07:01  -  11-05-19 @ 07:00  --------------------------------------------------------  IN: 1900 mL / OUT: 1105 mL / NET: 795 mL    A&OX3 in NAD  CTA bilat  sternum stable, no drainage  nl s1, s2  abd soft/NT/ND  +1 peripheral edema  RLE very tender to touch, no erythema  SVG harvest site is healing well    LABS:                        8.0    5.30  )-----------( 195      ( 05 Nov 2019 01:00 )             26.0     11-05    137  |  97<L>  |  51<H>  ----------------------------<  115<H>  4.8   |  29  |  1.2    Ca    9.0      05 Nov 2019 01:00  Mg     2.0     11-05    TPro  5.3<L>  /  Alb  2.9<L>  /  TBili  <0.2  /  DBili  x   /  AST  18  /  ALT  8   /  AlkPhos  116<H>  11-05    MEDICATIONS  (STANDING):  albuterol/ipratropium for Nebulization 3 milliLiter(s) Nebulizer every 6 hours  aMIOdarone    Tablet 200 milliGRAM(s) Oral daily  ascorbic acid 500 milliGRAM(s) Oral daily  aspirin  chewable 81 milliGRAM(s) Oral daily  atorvastatin 40 milliGRAM(s) Oral at bedtime  buMETAnide 1 milliGRAM(s) Oral daily  carvedilol 6.25 milliGRAM(s) Oral every 12 hours  cefepime   IVPB 1000 milliGRAM(s) IV Intermittent every 12 hours  dextrose 5%. 1000 milliLiter(s) (50 mL/Hr) IV Continuous <Continuous>  dextrose 50% Injectable 12.5 Gram(s) IV Push once  dextrose 50% Injectable 25 Gram(s) IV Push once  dextrose 50% Injectable 25 Gram(s) IV Push once  heparin  Injectable 5000 Unit(s) SubCutaneous every 8 hours  hydrALAZINE 10 milliGRAM(s) Oral every 8 hours  insulin glargine Injectable (LANTUS) 20 Unit(s) SubCutaneous every morning  insulin lispro (HumaLOG) corrective regimen sliding scale   SubCutaneous three times a day before meals  isosorbide   dinitrate Tablet (ISORDIL) 10 milliGRAM(s) Oral three times a day  metFORMIN 1000 milliGRAM(s) Oral every 12 hours  pantoprazole    Tablet 40 milliGRAM(s) Oral before breakfast  polyethylene glycol 3350 17 Gram(s) Oral every 24 hours  senna 1 Tablet(s) Oral every 12 hours  sodium chloride 0.9% lock flush 3 milliLiter(s) IV Push every 8 hours  tamsulosin Oral Tab/Cap - Peds 0.4 milliGRAM(s) Oral at bedtime  tiotropium 18 MICROgram(s) Capsule 1 Capsule(s) Inhalation daily  vancomycin  IVPB 1000 milliGRAM(s) IV Intermittent every 12 hours  vancomycin  IVPB      zinc sulfate 220 milliGRAM(s) Oral daily    MEDICATIONS  (PRN):  acetaminophen   Tablet .. 650 milliGRAM(s) Oral every 6 hours PRN Mild Pain (1 - 3)  dextrose 40% Gel 15 Gram(s) Oral once PRN Blood Glucose LESS THAN 70 milliGRAM(s)/deciliter  glucagon  Injectable 1 milliGRAM(s) IntraMuscular once PRN Glucose LESS THAN 70 milligrams/deciliter

## 2019-11-05 NOTE — HISTORY OF PRESENT ILLNESS
[FreeTextEntry1] : 67 y/o F with h/o DM, paroxysmal afib, CAD s/p recent MI and diagnosis of triple vessel disease with severely depressed LV systolic function and acute heart failure. Patient underwent 3v-CABG (LIMA-LAD, SVG-OM and SVG-PDA), post operative course was complicated by persistent symptoms of heart failure, Pleural effusion that required thoracentesis, GIB and urinary retention. She also had paroxysmal afib post Op. She was started on amiodarone. No A/C given while hospitalized due to GIB. Patient was discharged on 10/18 home with a montejo catheter. Her LVEF at discharge was ~ 20% with a restrictive filling pattern. \par \par During the week before today's visit, I adjusted the diuretics and losartan dose in collaboration with the CTS team. Since Monday, patient has lost ~ 6 lbs. She is taking losartan 25 mg bid (since yesterday) and Bumex 1 mg twice daily. She is also taking carvedilol 3.125 mg bid. \par \par Today, patient reports having significant symptoms of heart failure but this has remained stable to slightly improved since discharged. She gets SOB with minimal exertion and has to move around in a wheelchair. \par She has edema up to the thighs.  \par \par She left the hospital with a LifeVest

## 2019-11-05 NOTE — PROGRESS NOTE ADULT - SUBJECTIVE AND OBJECTIVE BOX
OPERATIVE PROCEDURE(s):                POD #                       68yFemale  SURGEON(s): CHINA Escobar  SUBJECTIVE ASSESSMENT:   Vital Signs Last 24 Hrs  T(F): 97.2 (2019 20:00), Max: 97.2 (2019 20:00)  HR: 63 (2019 07:00) (63 - 90)  BP: 110/54 (2019 07:00) (86/48 - 119/59)  BP(mean): 78 (2019 07:00) (61 - 85)  RR: 18 (2019 15:00) (18 - 22)  SpO2: 99% (2019 07:00) (95% - 100%)        I&O's Detail    2019 07:01  -  2019 07:00  --------------------------------------------------------  IN:    IV PiggyBack: 660 mL    Oral Fluid: 1240 mL  Total IN: 1900 mL    OUT:    Indwelling Catheter - Urethral: 1115 mL  Total OUT: 1115 mL        Net:   I&O's Detail    2019 07:01  -  2019 07:00  --------------------------------------------------------  Total NET: -316.2 mL      2019 07:01  -  2019 07:00  --------------------------------------------------------  Total NET: 785 mL        CAPILLARY BLOOD GLUCOSE      POCT Blood Glucose.: 167 mg/dL (2019 06:46)  POCT Blood Glucose.: 197 mg/dL (2019 19:59)  POCT Blood Glucose.: 161 mg/dL (2019 16:38)  POCT Blood Glucose.: 181 mg/dL (2019 10:33)    Physical Exam:  General: NAD; A&Ox3/Patient is intubated and sedated  Cardiac: S1/S2, RRR, no murmur, no rubs  Lungs: unlabored respirations, CTA b/l, no wheeze, no rales, no crackles  Abdomen: Soft/NT/ND; positive bowel sounds x 4  Sternum: Intact, no click, incision healing well with no drainage  Incisions: Incisions clean/dry/intact  Extremities: No edema b/l lower extremities; good capillary refill; no cyanosis; palpable 1+ pedal pulses b/l    Central Venous Catheter: Yes[]  No[] , If Yes indication:           Day #  Castellon Catheter: Yes  [] , No  [] , If yes indication:                      Day #  NGT: Yes [] No [] ,    If Yes Placement:                                     Day #  EPICARDIAL WIRES:  [] YES [] NO                                              Day #  BOWEL MOVEMENT:  [] YES [] NO, If No, Timing since last BM:      Day #  CHEST TUBE(Left/Right):  [] YES [] NO, If yes -  AIR LEAKS:  [] YES [] NO        LABS:                        8.0<L>  5.30  )-----------( 195      ( 2019 01:00 )             26.0<L>                        8.5<L>  8.65  )-----------( 206      ( 2019 01:40 )             27.4<L>        137  |  97<L>  |  51<H>  ----------------------------<  115<H>  4.8   |  29  |  1.2      138  |  96<L>  |  45<H>  ----------------------------<  136<H>  5.5<H>   |  30  |  1.0    Ca    9.0      2019 01:00  Mg     2.0         TPro  5.3<L> [6.0 - 8.0]  /  Alb  2.9<L> [3.5 - 5.2]  /  TBili  <0.2 [0.2 - 1.2]  /  DBili  x   /  AST  18 [0 - 41]  /  ALT  8 [0 - 41]  /  AlkPhos  116<H> [30 - 115]  11-05      Urinalysis Basic - ( 2019 19:40 )    Color: Light Yellow / Appearance: Clear / S.014 / pH: x  Gluc: x / Ketone: Negative  / Bili: Negative / Urobili: <2 mg/dL   Blood: x / Protein: 30 mg/dL / Nitrite: Negative   Leuk Esterase: Moderate / RBC: 14 /HPF / WBC 7 /HPF   Sq Epi: x / Non Sq Epi: 3 /HPF / Bacteria: Negative      ABG - ( 2019 06:38 )  pH: 7.37  /  pCO2: 56    /  pO2: 89    / HCO3: 32    / Base Excess: 6.1   /  SaO2: 98    /  LA: 0.7              RADIOLOGY & ADDITIONAL TESTS:  CXR:  EKG:  MEDICATIONS  (STANDING):  albuterol/ipratropium for Nebulization 3 milliLiter(s) Nebulizer every 6 hours  aMIOdarone    Tablet 200 milliGRAM(s) Oral daily  ascorbic acid 500 milliGRAM(s) Oral daily  aspirin  chewable 81 milliGRAM(s) Oral daily  atorvastatin 40 milliGRAM(s) Oral at bedtime  buMETAnide 1 milliGRAM(s) Oral daily  carvedilol 6.25 milliGRAM(s) Oral every 12 hours  cefepime   IVPB 1000 milliGRAM(s) IV Intermittent every 12 hours  dextrose 5%. 1000 milliLiter(s) (50 mL/Hr) IV Continuous <Continuous>  dextrose 50% Injectable 12.5 Gram(s) IV Push once  dextrose 50% Injectable 25 Gram(s) IV Push once  dextrose 50% Injectable 25 Gram(s) IV Push once  heparin  Injectable 5000 Unit(s) SubCutaneous every 8 hours  hydrALAZINE 10 milliGRAM(s) Oral every 8 hours  insulin glargine Injectable (LANTUS) 20 Unit(s) SubCutaneous every morning  insulin lispro (HumaLOG) corrective regimen sliding scale   SubCutaneous three times a day before meals  isosorbide   dinitrate Tablet (ISORDIL) 10 milliGRAM(s) Oral three times a day  metFORMIN 1000 milliGRAM(s) Oral every 12 hours  pantoprazole    Tablet 40 milliGRAM(s) Oral before breakfast  polyethylene glycol 3350 17 Gram(s) Oral every 24 hours  senna 1 Tablet(s) Oral every 12 hours  sodium chloride 0.9% lock flush 3 milliLiter(s) IV Push every 8 hours  tamsulosin Oral Tab/Cap - Peds 0.4 milliGRAM(s) Oral at bedtime  tiotropium 18 MICROgram(s) Capsule 1 Capsule(s) Inhalation daily  vancomycin  IVPB 1000 milliGRAM(s) IV Intermittent every 12 hours  vancomycin  IVPB      zinc sulfate 220 milliGRAM(s) Oral daily    MEDICATIONS  (PRN):  acetaminophen   Tablet .. 650 milliGRAM(s) Oral every 6 hours PRN Mild Pain (1 - 3)  dextrose 40% Gel 15 Gram(s) Oral once PRN Blood Glucose LESS THAN 70 milliGRAM(s)/deciliter  fentaNYL    Injectable 50 MICROGram(s) IV Push once PRN to bedside for procedure  glucagon  Injectable 1 milliGRAM(s) IntraMuscular once PRN Glucose LESS THAN 70 milligrams/deciliter  lidocaine 1% Injectable 20 milliLiter(s) Local Injection once PRN to bedside for procedure  midazolam Injectable 2 milliGRAM(s) IV Push once PRN to bedside for procedure    HEPARIN:  [] YES [] NO  Dose: XX UNITS/HR UNITS Q8H  LOVENOX:[] YES [] NO  Dose: XX mg Q24H  COUMADIN: []  YES [] NO  Dose: XX mg  Q24H  SCD's: YES b/l  GI Prophylaxis: Protonix [], Pepcid [], None [], (Contra-indication:.....)    Post-Op Beta-Blockers: Yes [], No[], If No, then contraindication:  Post-Op Aspirin: Yes [],  No [], If No, then contraindication:  Post-Op Statin: Yes [], No[], If No, then contraindication:  Allergies    No Known Allergies    Intolerances      Ambulation/Activity Status:    Assessment/Plan:  68y Female status-post .....  - Case and plan discussed with CTU Intensivist and CT Surgeon - Dr. Urbano/Flaco/Shawn   - Continue CTU supportive care    - Continue DVT/GI prophylaxis  - Incentive Spirometry 10 times an hour  - Continue to advance physical activity as tolerated and continue PT/OT as directed  1. CAD: Continue ASA, statin, BB  2. HTN:   3. A. Fib:   4. COPD/Hypoxia:   5. DM/Glucose Control:     Social Service Disposition:

## 2019-11-05 NOTE — ADVANCED PRACTICE NURSE CONSULT - RECOMMEDATIONS
Pressure relief / OOB to chair as much as tolerated   Seat cushion while in chair  Off load heels  Triad to open areas , cover with an Allevyn when protection needed( movement)  Family support and education as needed.

## 2019-11-05 NOTE — PROCEDURE NOTE - GENERAL PROCEDURE DETAILS
4% lidocaine nebulized and topical 1% lidocaine liberally through airway.  bronchoscope inserted and navigated to subsegmental airway.  all aspirations cleared.  cords visualized and open throughout procedure.  Sedation was with 1mg versed and 25mcg fentanyl, followed by additional 1mg versed during procedure.

## 2019-11-05 NOTE — REASON FOR VISIT
[Follow-Up - From Hospitalization] : follow-up of a recent hospitalization for [Heart Failure] : congestive heart failure [Admitted for Heart Failure] : patient was admitted for heart failure [FreeTextEntry1] : 6

## 2019-11-05 NOTE — ADVANCED PRACTICE NURSE CONSULT - REASON FOR CONSULT
Caller would like to discuss an/a Order for FU-lab work for routine DM check. Writer advised caller of callback within 24-72 hours.    Patient Name: Yemi Deleon  Caller Name: wife, Alcira  Name of Facility: n/a  Callback Number: [phone] (M)  Best Availability: any (M)  Fax Number: n/a  Additional Info: please advise when order is ready hopefully in the next few days to have results before his appointment on the 20th  Did you confirm the message with the caller?: yes    Thank you,  Michelle Pillai   Stage 3 Pressure injury  60 minute assessment education     Patient at risk fo further breakdown

## 2019-11-06 ENCOUNTER — RESULT REVIEW (OUTPATIENT)
Age: 69
End: 2019-11-06

## 2019-11-06 LAB
ALBUMIN SERPL ELPH-MCNC: 3.1 G/DL — LOW (ref 3.5–5.2)
ALP SERPL-CCNC: 90 U/L — SIGNIFICANT CHANGE UP (ref 30–115)
ALT FLD-CCNC: 6 U/L — SIGNIFICANT CHANGE UP (ref 0–41)
ANION GAP SERPL CALC-SCNC: 13 MMOL/L — SIGNIFICANT CHANGE UP (ref 7–14)
AST SERPL-CCNC: 11 U/L — SIGNIFICANT CHANGE UP (ref 0–41)
BILIRUB SERPL-MCNC: <0.2 MG/DL — SIGNIFICANT CHANGE UP (ref 0.2–1.2)
BUN SERPL-MCNC: 66 MG/DL — CRITICAL HIGH (ref 10–20)
CALCIUM SERPL-MCNC: 9 MG/DL — SIGNIFICANT CHANGE UP (ref 8.5–10.1)
CHLORIDE SERPL-SCNC: 98 MMOL/L — SIGNIFICANT CHANGE UP (ref 98–110)
CO2 SERPL-SCNC: 27 MMOL/L — SIGNIFICANT CHANGE UP (ref 17–32)
CREAT SERPL-MCNC: 1.7 MG/DL — HIGH (ref 0.7–1.5)
GLUCOSE BLDC GLUCOMTR-MCNC: 104 MG/DL — HIGH (ref 70–99)
GLUCOSE BLDC GLUCOMTR-MCNC: 147 MG/DL — HIGH (ref 70–99)
GLUCOSE BLDC GLUCOMTR-MCNC: 165 MG/DL — HIGH (ref 70–99)
GLUCOSE BLDC GLUCOMTR-MCNC: 270 MG/DL — HIGH (ref 70–99)
GLUCOSE SERPL-MCNC: 137 MG/DL — HIGH (ref 70–99)
GRAM STN FLD: SIGNIFICANT CHANGE UP
HCT VFR BLD CALC: 26.1 % — LOW (ref 37–47)
HGB BLD-MCNC: 8 G/DL — LOW (ref 12–16)
LEGIONELLA AG UR QL: NEGATIVE — SIGNIFICANT CHANGE UP
MAGNESIUM SERPL-MCNC: 1.9 MG/DL — SIGNIFICANT CHANGE UP (ref 1.8–2.4)
MCHC RBC-ENTMCNC: 27.8 PG — SIGNIFICANT CHANGE UP (ref 27–31)
MCHC RBC-ENTMCNC: 30.7 G/DL — LOW (ref 32–37)
MCV RBC AUTO: 90.6 FL — SIGNIFICANT CHANGE UP (ref 81–99)
MRSA PCR RESULT.: NEGATIVE — SIGNIFICANT CHANGE UP
NRBC # BLD: 0 /100 WBCS — SIGNIFICANT CHANGE UP (ref 0–0)
PLATELET # BLD AUTO: 166 K/UL — SIGNIFICANT CHANGE UP (ref 130–400)
POTASSIUM SERPL-MCNC: 5.1 MMOL/L — HIGH (ref 3.5–5)
POTASSIUM SERPL-SCNC: 5.1 MMOL/L — HIGH (ref 3.5–5)
PROT SERPL-MCNC: 5.3 G/DL — LOW (ref 6–8)
RBC # BLD: 2.88 M/UL — LOW (ref 4.2–5.4)
RBC # FLD: 16.4 % — HIGH (ref 11.5–14.5)
SODIUM SERPL-SCNC: 138 MMOL/L — SIGNIFICANT CHANGE UP (ref 135–146)
SPECIMEN SOURCE: SIGNIFICANT CHANGE UP
WBC # BLD: 5.37 K/UL — SIGNIFICANT CHANGE UP (ref 4.8–10.8)
WBC # FLD AUTO: 5.37 K/UL — SIGNIFICANT CHANGE UP (ref 4.8–10.8)

## 2019-11-06 PROCEDURE — 71045 X-RAY EXAM CHEST 1 VIEW: CPT | Mod: 26

## 2019-11-06 PROCEDURE — 71250 CT THORAX DX C-: CPT | Mod: 26

## 2019-11-06 PROCEDURE — 99291 CRITICAL CARE FIRST HOUR: CPT

## 2019-11-06 PROCEDURE — 88305 TISSUE EXAM BY PATHOLOGIST: CPT | Mod: 26

## 2019-11-06 PROCEDURE — 99024 POSTOP FOLLOW-UP VISIT: CPT

## 2019-11-06 PROCEDURE — 88112 CYTOPATH CELL ENHANCE TECH: CPT | Mod: 26

## 2019-11-06 PROCEDURE — 99233 SBSQ HOSP IP/OBS HIGH 50: CPT

## 2019-11-06 PROCEDURE — 32551 INSERTION OF CHEST TUBE: CPT

## 2019-11-06 PROCEDURE — 99233 SBSQ HOSP IP/OBS HIGH 50: CPT | Mod: 25

## 2019-11-06 RX ORDER — ALBUMIN HUMAN 25 %
500 VIAL (ML) INTRAVENOUS ONCE
Refills: 0 | Status: COMPLETED | OUTPATIENT
Start: 2019-11-06 | End: 2019-11-06

## 2019-11-06 RX ORDER — ALPRAZOLAM 0.25 MG
0.25 TABLET ORAL ONCE
Refills: 0 | Status: DISCONTINUED | OUTPATIENT
Start: 2019-11-06 | End: 2019-11-06

## 2019-11-06 RX ADMIN — HEPARIN SODIUM 5000 UNIT(S): 5000 INJECTION INTRAVENOUS; SUBCUTANEOUS at 15:33

## 2019-11-06 RX ADMIN — SODIUM CHLORIDE 3 MILLILITER(S): 9 INJECTION INTRAMUSCULAR; INTRAVENOUS; SUBCUTANEOUS at 23:00

## 2019-11-06 RX ADMIN — ISOSORBIDE DINITRATE 10 MILLIGRAM(S): 5 TABLET ORAL at 22:35

## 2019-11-06 RX ADMIN — Medication 2: at 16:35

## 2019-11-06 RX ADMIN — ACETAZOLAMIDE 110 MILLIGRAM(S): 250 TABLET ORAL at 12:27

## 2019-11-06 RX ADMIN — Medication 10 MILLIGRAM(S): at 15:33

## 2019-11-06 RX ADMIN — Medication 650 MILLIGRAM(S): at 10:48

## 2019-11-06 RX ADMIN — CARVEDILOL PHOSPHATE 6.25 MILLIGRAM(S): 80 CAPSULE, EXTENDED RELEASE ORAL at 18:11

## 2019-11-06 RX ADMIN — Medication 5 UNIT(S): at 23:00

## 2019-11-06 RX ADMIN — PANTOPRAZOLE SODIUM 40 MILLIGRAM(S): 20 TABLET, DELAYED RELEASE ORAL at 05:49

## 2019-11-06 RX ADMIN — Medication 3 MILLILITER(S): at 13:55

## 2019-11-06 RX ADMIN — HEPARIN SODIUM 5000 UNIT(S): 5000 INJECTION INTRAVENOUS; SUBCUTANEOUS at 05:51

## 2019-11-06 RX ADMIN — HEPARIN SODIUM 5000 UNIT(S): 5000 INJECTION INTRAVENOUS; SUBCUTANEOUS at 22:35

## 2019-11-06 RX ADMIN — Medication 125 MILLIGRAM(S): at 18:11

## 2019-11-06 RX ADMIN — SODIUM CHLORIDE 3 MILLILITER(S): 9 INJECTION INTRAMUSCULAR; INTRAVENOUS; SUBCUTANEOUS at 15:32

## 2019-11-06 RX ADMIN — ZINC SULFATE TAB 220 MG (50 MG ZINC EQUIVALENT) 220 MILLIGRAM(S): 220 (50 ZN) TAB at 12:26

## 2019-11-06 RX ADMIN — Medication 3 MILLILITER(S): at 08:26

## 2019-11-06 RX ADMIN — TAMSULOSIN HYDROCHLORIDE 0.4 MILLIGRAM(S): 0.4 CAPSULE ORAL at 22:37

## 2019-11-06 RX ADMIN — SODIUM CHLORIDE 3 MILLILITER(S): 9 INJECTION INTRAMUSCULAR; INTRAVENOUS; SUBCUTANEOUS at 05:54

## 2019-11-06 RX ADMIN — Medication 500 MILLIGRAM(S): at 12:26

## 2019-11-06 RX ADMIN — Medication 25 MILLIGRAM(S): at 05:54

## 2019-11-06 RX ADMIN — AMIODARONE HYDROCHLORIDE 200 MILLIGRAM(S): 400 TABLET ORAL at 05:52

## 2019-11-06 RX ADMIN — Medication 81 MILLIGRAM(S): at 12:26

## 2019-11-06 RX ADMIN — Medication 125 MILLIGRAM(S): at 13:07

## 2019-11-06 RX ADMIN — ISOSORBIDE DINITRATE 10 MILLIGRAM(S): 5 TABLET ORAL at 15:33

## 2019-11-06 RX ADMIN — Medication 10 MILLIGRAM(S): at 22:35

## 2019-11-06 RX ADMIN — Medication 0.25 MILLIGRAM(S): at 06:53

## 2019-11-06 RX ADMIN — METFORMIN HYDROCHLORIDE 1000 MILLIGRAM(S): 850 TABLET ORAL at 05:51

## 2019-11-06 RX ADMIN — METFORMIN HYDROCHLORIDE 1000 MILLIGRAM(S): 850 TABLET ORAL at 18:11

## 2019-11-06 RX ADMIN — Medication 650 MILLIGRAM(S): at 19:55

## 2019-11-06 RX ADMIN — Medication 650 MILLIGRAM(S): at 11:16

## 2019-11-06 RX ADMIN — ATORVASTATIN CALCIUM 40 MILLIGRAM(S): 80 TABLET, FILM COATED ORAL at 22:35

## 2019-11-06 RX ADMIN — CEFEPIME 100 MILLIGRAM(S): 1 INJECTION, POWDER, FOR SOLUTION INTRAMUSCULAR; INTRAVENOUS at 18:11

## 2019-11-06 RX ADMIN — CEFEPIME 100 MILLIGRAM(S): 1 INJECTION, POWDER, FOR SOLUTION INTRAMUSCULAR; INTRAVENOUS at 05:20

## 2019-11-06 RX ADMIN — SENNA PLUS 1 TABLET(S): 8.6 TABLET ORAL at 05:49

## 2019-11-06 RX ADMIN — Medication 250 MILLILITER(S): at 05:45

## 2019-11-06 RX ADMIN — Medication 250 MILLIGRAM(S): at 06:00

## 2019-11-06 RX ADMIN — Medication 3 MILLILITER(S): at 20:55

## 2019-11-06 RX ADMIN — INSULIN GLARGINE 20 UNIT(S): 100 INJECTION, SOLUTION SUBCUTANEOUS at 07:31

## 2019-11-06 NOTE — PROCEDURE NOTE - NSPOSTCAREGUIDE_GEN_A_CORE
Care for catheter as per unit/ICU protocols
Instructed patient/caregiver to follow-up with primary care physician/Instructed patient/caregiver regarding signs and symptoms of infection/Keep the cast/splint/dressing clean and dry/Verbal/written post procedure instructions were given to patient/caregiver
Verbal/written post procedure instructions were given to patient/caregiver
Verbal/written post procedure instructions were given to patient/caregiver

## 2019-11-06 NOTE — PROGRESS NOTE ADULT - SUBJECTIVE AND OBJECTIVE BOX
CTU Attending Progress Daily Note     06 Nov 2019 10:31  POD# -   He has history of Female bladder prolapse  Diabetes    Interval event for past 24 hr:  GUS WILBURN  68y had no event.   Current Complains:  GUS WILBURN has no new complains  HPI:  69 y/o F with h/o CAD s/p CABG, ICM, chronic systolic heart failure with EF ~ 25% coming today with rash in RLE and pain. Patient can't bear any weight. The pain started suddenly. She also notes some SOB but this is not that significant compared to her baseline. She can walk minimally around the house before getting SOB. She denies PND or orthopnea but has pedal edema. No LOC, palpitations, bleeding episodes. (03 Nov 2019 07:05)    OBJECTIVE:  ICU Vital Signs Last 24 Hrs  T(C): 36.2 (06 Nov 2019 08:00), Max: 36.2 (06 Nov 2019 08:00)  T(F): 97.2 (06 Nov 2019 08:00), Max: 97.2 (06 Nov 2019 08:00)  HR: 80 (06 Nov 2019 09:00) (58 - 80)  BP: 125/60 (06 Nov 2019 09:00) (86/50 - 129/57)  BP(mean): 86 (06 Nov 2019 09:00) (64 - 92)  ABP: --  ABP(mean): --  RR: 24 (06 Nov 2019 09:00) (16 - 24)  SpO2: 99% (06 Nov 2019 09:00) (96% - 100%)    I&O's Summary    05 Nov 2019 07:01  -  06 Nov 2019 07:00  --------------------------------------------------------  IN: 1290 mL / OUT: 465 mL / NET: 825 mL    06 Nov 2019 07:01  -  06 Nov 2019 10:31  --------------------------------------------------------  IN: 1100 mL / OUT: 1450 mL / NET: -350 mL      I&O's Detail    05 Nov 2019 07:01  -  06 Nov 2019 07:00  --------------------------------------------------------  IN:    Albumin 5%  - 500 mL: 750 mL    Glucerna 1.5: 240 mL    IV PiggyBack: 300 mL  Total IN: 1290 mL    OUT:    Indwelling Catheter - Urethral: 465 mL  Total OUT: 465 mL    Total NET: 825 mL      06 Nov 2019 07:01  -  06 Nov 2019 10:31  --------------------------------------------------------  IN:    Albumin 5%  - 500 mL: 500 mL    Glucerna 1.5: 240 mL    Oral Fluid: 360 mL  Total IN: 1100 mL    OUT:    Chest Tube: 1400 mL    Indwelling Catheter - Urethral: 50 mL  Total OUT: 1450 mL    Total NET: -350 mL        Adult Advanced Hemodynamics Last 24 Hrs  CVP(mm Hg): --  CVP(cm H2O): --  CO: --  CI: --  PA: --  PA(mean): --  PCWP: --  SVR: --  SVRI: --  PVR: --  PVRI: --    CAPILLARY BLOOD GLUCOSE      POCT Blood Glucose.: 147 mg/dL (06 Nov 2019 06:37)  POCT Blood Glucose.: 139 mg/dL (05 Nov 2019 21:53)  POCT Blood Glucose.: 110 mg/dL (05 Nov 2019 16:12)  POCT Blood Glucose.: 114 mg/dL (05 Nov 2019 11:32)    LABS:  ABG - ( 05 Nov 2019 06:38 )  pH, Arterial: 7.37  pH, Blood: x     /  pCO2: 56    /  pO2: 89    / HCO3: 32    / Base Excess: 6.1   /  SaO2: 98                                      8.0    5.37  )-----------( 166      ( 06 Nov 2019 02:55 )             26.1     11-06    138  |  98  |  66<HH>  ----------------------------<  137<H>  5.1<H>   |  27  |  1.7<H>    Ca    9.0      06 Nov 2019 02:55  Mg     1.9     11-06    TPro  5.3<L>  /  Alb  3.1<L>  /  TBili  <0.2  /  DBili  x   /  AST  11  /  ALT  6   /  AlkPhos  90  11-06          Home Medications:  acetaminophen 325 mg oral tablet: 2 tab(s) orally every 6 hours, As needed, Mild Pain (1 - 3) (18 Oct 2019 14:37)    HOSPITAL MEDICATIONS:  MEDICATIONS  (STANDING):  acetaZOLAMIDE  IVPB 500 milliGRAM(s) IV Intermittent daily  albuterol/ipratropium for Nebulization 3 milliLiter(s) Nebulizer every 6 hours  aMIOdarone    Tablet 200 milliGRAM(s) Oral daily  ascorbic acid 500 milliGRAM(s) Oral daily  aspirin  chewable 81 milliGRAM(s) Oral daily  atorvastatin 40 milliGRAM(s) Oral at bedtime  buMETAnide 1 milliGRAM(s) Oral daily  carvedilol 6.25 milliGRAM(s) Oral every 12 hours  cefepime   IVPB 1000 milliGRAM(s) IV Intermittent every 12 hours  dextrose 5%. 1000 milliLiter(s) (50 mL/Hr) IV Continuous <Continuous>  dextrose 50% Injectable 12.5 Gram(s) IV Push once  dextrose 50% Injectable 25 Gram(s) IV Push once  dextrose 50% Injectable 25 Gram(s) IV Push once  heparin  Injectable 5000 Unit(s) SubCutaneous every 8 hours  hydrALAZINE 10 milliGRAM(s) Oral every 8 hours  insulin glargine Injectable (LANTUS) 20 Unit(s) SubCutaneous every morning  insulin lispro (HumaLOG) corrective regimen sliding scale   SubCutaneous three times a day before meals  isosorbide   dinitrate Tablet (ISORDIL) 10 milliGRAM(s) Oral three times a day  metFORMIN 1000 milliGRAM(s) Oral every 12 hours  pantoprazole    Tablet 40 milliGRAM(s) Oral before breakfast  polyethylene glycol 3350 17 Gram(s) Oral every 24 hours  pregabalin 25 milliGRAM(s) Oral every 12 hours  senna 1 Tablet(s) Oral every 12 hours  sodium chloride 0.9% lock flush 3 milliLiter(s) IV Push every 8 hours  tamsulosin Oral Tab/Cap - Peds 0.4 milliGRAM(s) Oral at bedtime  tiotropium 18 MICROgram(s) Capsule 1 Capsule(s) Inhalation daily  zinc sulfate 220 milliGRAM(s) Oral daily    MEDICATIONS  (PRN):  acetaminophen   Tablet .. 650 milliGRAM(s) Oral every 6 hours PRN Mild Pain (1 - 3)  dextrose 40% Gel 15 Gram(s) Oral once PRN Blood Glucose LESS THAN 70 milliGRAM(s)/deciliter  glucagon  Injectable 1 milliGRAM(s) IntraMuscular once PRN Glucose LESS THAN 70 milligrams/deciliter      REVIEW OF SYSTEMS:  CONSTITUTIONAL: [X] all negative; [ ] weakness, [ ] fevers, [ ] chills  EYES/ENT: [X] all negative; [ ] visual changes, [ ] vertigo, [ ] throat pain   NECK: [X] all negative; [ ] pain, [ ] stiffness  RESPIRATORY: [] all negative, [ ] cough, [ ] wheezing, [ ] hemoptysis, [ ] shortness of breath  CARDIOVASCULAR: [] all negative; [ ] chest pain, [ ] palpitations, [ ] orthopnea  GASTROINTESTINAL: [X] all negative; [ ]abdominal pain, [ ] nausea, [ ] vomiting, [ ] hematemesis, [ ] diarrhea, [ ] constipation, [ ] melena, [ ] hematochezia.  GENITOURINARY: [X] all negative; [ ] dysuria, [ ] frequency, [ ] hematuria  NEUROLOGICAL: [X] all negative; [ ] numbness, [ ] weakness  SKIN: [X] all negative; [ ] itching, [ ] burning, [ ] rashes, [ ] lesions   All other review of systems is negative unless indicated above.    [  ] Unable to assess ROS because     PHYSICAL EXAM:          CONSTITUTIONAL: Well-developed; well-nourished; in no acute distress.   	SKIN: warm, dry  	HEAD: Normocephalic; atraumatic.  	EYES: PERRL, EOM, no conj injection, sclera clear  	ENT: No nasal discharge; airway clear.  	NECK: Supple; non tender.  No midline ttp ctls  	CARD: S1, S2 normal; no murmurs, gallops, or rubs. Regular rate and rhythm. 2+ RPs and DPs bilat, no carotid bruits, no pedal   edema, no calf pain b/l  	RESP: CTA  bilat good air movement No wheezes, rales or rhonchi.  	ABD: Soft, not tender, not distended, no CVA ttp no rebound or guarding, bowel sounds present  	EXT: Normal ROM.  No clubbing, cyanosis or edema.   	  	NEURO: Alert, awake, motor 5/5 R, 5/5 L        RADIOLOGY:  xray    I spent 45 minutes of critical care time examining patient, reviewing vitals, labs, medications, imaging and discussing with the team goals of care to prevent life-threatening in this patient who is at high risk for deterioration or death due to: CTU Attending Progress Daily Note     06 Nov 2019 10:31  POD# -   He has history of Female bladder prolapse  Diabetes    Interval event for past 24 hr:  GUS WILBURN  68y had no event.   Current Complains:  GUS WILBURN has no new complains  HPI:  69 y/o F with h/o CAD s/p CABG, ICM, chronic systolic heart failure with EF ~ 25% coming today with rash in RLE and pain. Patient can't bear any weight. The pain started suddenly. She also notes some SOB but this is not that significant compared to her baseline. She can walk minimally around the house before getting SOB. She denies PND or orthopnea but has pedal edema. No LOC, palpitations, bleeding episodes. (03 Nov 2019 07:05)    OBJECTIVE:  ICU Vital Signs Last 24 Hrs  T(C): 36.2 (06 Nov 2019 08:00), Max: 36.2 (06 Nov 2019 08:00)  T(F): 97.2 (06 Nov 2019 08:00), Max: 97.2 (06 Nov 2019 08:00)  HR: 80 (06 Nov 2019 09:00) (58 - 80)  BP: 125/60 (06 Nov 2019 09:00) (86/50 - 129/57)  BP(mean): 86 (06 Nov 2019 09:00) (64 - 92)  ABP: --  ABP(mean): --  RR: 24 (06 Nov 2019 09:00) (16 - 24)  SpO2: 99% (06 Nov 2019 09:00) (96% - 100%)    I&O's Summary    05 Nov 2019 07:01  -  06 Nov 2019 07:00  --------------------------------------------------------  IN: 1290 mL / OUT: 465 mL / NET: 825 mL    06 Nov 2019 07:01  -  06 Nov 2019 10:31  --------------------------------------------------------  IN: 1100 mL / OUT: 1450 mL / NET: -350 mL      I&O's Detail    05 Nov 2019 07:01  -  06 Nov 2019 07:00  --------------------------------------------------------  IN:    Albumin 5%  - 500 mL: 750 mL    Glucerna 1.5: 240 mL    IV PiggyBack: 300 mL  Total IN: 1290 mL    OUT:    Indwelling Catheter - Urethral: 465 mL  Total OUT: 465 mL    Total NET: 825 mL      06 Nov 2019 07:01  -  06 Nov 2019 10:31  --------------------------------------------------------  IN:    Albumin 5%  - 500 mL: 500 mL    Glucerna 1.5: 240 mL    Oral Fluid: 360 mL  Total IN: 1100 mL    OUT:    Chest Tube: 1400 mL    Indwelling Catheter - Urethral: 50 mL  Total OUT: 1450 mL    Total NET: -350 mL            CAPILLARY BLOOD GLUCOSE      POCT Blood Glucose.: 147 mg/dL (06 Nov 2019 06:37)  POCT Blood Glucose.: 139 mg/dL (05 Nov 2019 21:53)  POCT Blood Glucose.: 110 mg/dL (05 Nov 2019 16:12)  POCT Blood Glucose.: 114 mg/dL (05 Nov 2019 11:32)    LABS:  ABG - ( 05 Nov 2019 06:38 )  pH, Arterial: 7.37  pH, Blood: x     /  pCO2: 56    /  pO2: 89    / HCO3: 32    / Base Excess: 6.1   /  SaO2: 98                                      8.0    5.37  )-----------( 166      ( 06 Nov 2019 02:55 )             26.1     11-06    138  |  98  |  66<HH>  ----------------------------<  137<H>  5.1<H>   |  27  |  1.7<H>    Ca    9.0      06 Nov 2019 02:55  Mg     1.9     11-06    TPro  5.3<L>  /  Alb  3.1<L>  /  TBili  <0.2  /  DBili  x   /  AST  11  /  ALT  6   /  AlkPhos  90  11-06          Home Medications:  acetaminophen 325 mg oral tablet: 2 tab(s) orally every 6 hours, As needed, Mild Pain (1 - 3) (18 Oct 2019 14:37)    HOSPITAL MEDICATIONS:  MEDICATIONS  (STANDING):  acetaZOLAMIDE  IVPB 500 milliGRAM(s) IV Intermittent daily  albuterol/ipratropium for Nebulization 3 milliLiter(s) Nebulizer every 6 hours  aMIOdarone    Tablet 200 milliGRAM(s) Oral daily  ascorbic acid 500 milliGRAM(s) Oral daily  aspirin  chewable 81 milliGRAM(s) Oral daily  atorvastatin 40 milliGRAM(s) Oral at bedtime  buMETAnide 1 milliGRAM(s) Oral daily  carvedilol 6.25 milliGRAM(s) Oral every 12 hours  cefepime   IVPB 1000 milliGRAM(s) IV Intermittent every 12 hours  dextrose 5%. 1000 milliLiter(s) (50 mL/Hr) IV Continuous <Continuous>  dextrose 50% Injectable 12.5 Gram(s) IV Push once  dextrose 50% Injectable 25 Gram(s) IV Push once  dextrose 50% Injectable 25 Gram(s) IV Push once  heparin  Injectable 5000 Unit(s) SubCutaneous every 8 hours  hydrALAZINE 10 milliGRAM(s) Oral every 8 hours  insulin glargine Injectable (LANTUS) 20 Unit(s) SubCutaneous every morning  insulin lispro (HumaLOG) corrective regimen sliding scale   SubCutaneous three times a day before meals  isosorbide   dinitrate Tablet (ISORDIL) 10 milliGRAM(s) Oral three times a day  metFORMIN 1000 milliGRAM(s) Oral every 12 hours  pantoprazole    Tablet 40 milliGRAM(s) Oral before breakfast  polyethylene glycol 3350 17 Gram(s) Oral every 24 hours  pregabalin 25 milliGRAM(s) Oral every 12 hours  senna 1 Tablet(s) Oral every 12 hours  sodium chloride 0.9% lock flush 3 milliLiter(s) IV Push every 8 hours  tamsulosin Oral Tab/Cap - Peds 0.4 milliGRAM(s) Oral at bedtime  tiotropium 18 MICROgram(s) Capsule 1 Capsule(s) Inhalation daily  zinc sulfate 220 milliGRAM(s) Oral daily    MEDICATIONS  (PRN):  acetaminophen   Tablet .. 650 milliGRAM(s) Oral every 6 hours PRN Mild Pain (1 - 3)  dextrose 40% Gel 15 Gram(s) Oral once PRN Blood Glucose LESS THAN 70 milliGRAM(s)/deciliter  glucagon  Injectable 1 milliGRAM(s) IntraMuscular once PRN Glucose LESS THAN 70 milligrams/deciliter      REVIEW OF SYSTEMS:  CONSTITUTIONAL: [X] all negative; [ ] weakness, [ ] fevers, [ ] chills  EYES/ENT: [X] all negative; [ ] visual changes, [ ] vertigo, [ ] throat pain   NECK: [X] all negative; [ ] pain, [ ] stiffness  RESPIRATORY: [] all negative, [ ] cough, [ ] wheezing, [ ] hemoptysis, [ ] shortness of breath  CARDIOVASCULAR: [] all negative; [ ] chest pain, [ ] palpitations, [ ] orthopnea  GASTROINTESTINAL: [X] all negative; [ ]abdominal pain, [ ] nausea, [ ] vomiting, [ ] hematemesis, [ ] diarrhea, [ ] constipation, [ ] melena, [ ] hematochezia.  GENITOURINARY: [X] all negative; [ ] dysuria, [ ] frequency, [ ] hematuria  NEUROLOGICAL: [X] all negative; [ ] numbness, [ ] weakness  SKIN: [X] all negative; [ ] itching, [ ] burning, [ ] rashes, [ ] lesions   All other review of systems is negative unless indicated above.    [  ] Unable to assess ROS because     PHYSICAL EXAM:          CONSTITUTIONAL: Well-developed; well-nourished; in no acute distress.   	SKIN: warm, dry  	HEAD: Normocephalic; atraumatic.  	EYES: PERRL, EOM, no conj injection, sclera clear  	ENT: No nasal discharge; airway clear.  	NECK: Supple; non tender.  No midline ttp ctls  	CARD: S1, S2 normal; no murmurs, gallops, or rubs. Regular rate and rhythm. 2+ RPs and DPs bilat, no carotid bruits, no pedal   edema, no calf pain b/l  	RESP: CTA  bilat good air movement No wheezes, rales or rhonchi.  	ABD: Soft, not tender, not distended, no CVA ttp no rebound or guarding, bowel sounds present  	EXT: Normal ROM.  No clubbing, cyanosis or edema.   	  	NEURO: Alert, awake, motor 5/5 R, 5/5 L        RADIOLOGY:  xray    < from: Xray Chest 1 View- PORTABLE-Routine (11.06.19 @ 06:02) >  Impression:      Stable right basilar opacity/effusion and decreased left basilar   opacity/effusion.    < end of copied text >  I spent 45 minutes of critical care time examining patient, reviewing vitals, labs, medications, imaging and discussing with the team goals of care to prevent life-threatening in this patient who is at high risk for deterioration or death due to:

## 2019-11-06 NOTE — PROCEDURE NOTE - NSPROCDETAILS_GEN_ALL_CORE
location identified, draped/prepped, sterile technique used/sterile technique, catheter placed/ultrasound guidance/sterile dressing applied
guidewire recovered/sterile dressing applied/lumen(s) aspirated and flushed/sterile technique, catheter placed/ultrasound guidance
location identified, draped/prepped, sterile technique used, needle inserted/introduced/Seldinger technique/connection to syringe/connection to evacuated glass bottle/catheter inserted over needle/ultrasound assessment of effusion (localization)/ultrasound assessment of effusion (size)
thoracostomy tube placed percutaneously/percutaneous/dressing applied/secured in place/sterile dressing applied/ultrasound assessment of fluid (location)/Seldinger technique

## 2019-11-06 NOTE — PROCEDURE NOTE - NSINFORMCONSENT_GEN_A_CORE
Benefits, risks, and possible complications of procedure explained to patient/caregiver who verbalized understanding and gave written consent.

## 2019-11-06 NOTE — PROCEDURE NOTE - NSSITEPREP_SKIN_A_CORE
chlorhexidine
Adherence to aseptic technique: hand hygiene prior to donning barriers (gown, gloves), don cap and mask, sterile drape over patient/chlorhexidine

## 2019-11-06 NOTE — PROGRESS NOTE ADULT - PROBLEM SELECTOR PLAN 1
ICM s/p CABG   Euvolemic on exam  S/p pigtail catheter to drain pleural effusion   Can hold Bumex given worsening renal function  Continue Hydralazine 10 mg TID and Isordil 10 mg TID   Continue carvedilol 6.25 mg twice daily - Increase to 9.375 mg bid tonight as long as systolic BP > 85 mmHg    Get iron profile for Friday morning (ferritin, TIBC, transferrin and serum iron)  Strict I/Os   Daily weights   Low salt diet   PT eval   Patient will likely benefit from going to rehab upon discharge  Incentive inspirometry  Consider adding glucerna as supplement   Discussed with CT surgery, CTU team and patient/family at bedside

## 2019-11-06 NOTE — PROGRESS NOTE ADULT - ASSESSMENT
PROBLEMS:  I spent 45 minutes of critical care time examining patient, reviewing vitals, labs, medications, imaging and discussing with the team goals of care to prevent life-threatening in this patient who is at high risk for deterioration or death due to:    1.	Acute on Chronic systolic CHF with low EF - hold diureis today cr going up  continue  coeg 6.25 q12 and isordil 10 q8 and hydralazin as tolelated  2.	fluid overload - BiPAP, when needed on NC now but feel sob  cxr R effusion large on ultrasound  3.	hyperkalemia - monitor bmp   4.	leg pain - possible cellulitis -  cefepine   d/c vanco lyrica started yestrday ..less leg pain today   5.	RIC - monitor bmp  6.	enlarge gall bladder - ( patient asymptomatic ) sono showed sludge  7.	DM - high glucose - lantus   8.	DVT / PUD prophylaxis   9 P[place] on ct atb Vanco and Cefpim  d/c vanco    10 R effusion large draind by me during the round  Jeovanny tube cathter 14 f placed 1400 cc so far will send pt for CT chest  fluid sent for cytology and culturs and LDH and pr  PLAN  Neuro: move all 4 extremities. no sensory or motor deficits  Pain management.   ALPRAZolam 0.25 milliGRAM(s) Oral two times a day PRN  gabapentin 100 milliGRAM(s) Oral two times a day  morphine  - Injectable 2 milliGRAM(s) IV Push every 5 minutes PRN  QUEtiapine 25 milliGRAM(s) Oral at bedtime    Pulm: Wean off supplemental oxygen as able. Daily CXR. Encourage coughing, deep breathing and use of incentive spirometry.   tiotropium 18 MICROgram(s) Capsule 1 Capsule(s) Inhalation daily    Cardio: Monitor telemetry/alarms. Continue supportive care   aMIOdarone    Tablet 200 milliGRAM(s) Oral daily  buMETAnide 1 milliGRAM(s) Oral daily  carvedilol 3.125 milliGRAM(s) Oral every 12 hours  milrinone Infusion 0.375 MICROgram(s)/kG/Min IV Continuous <Continuous>  sacubitril 24 mG/valsartan 26 mG 1 Tablet(s) Oral two times a day  tamsulosin Oral Tab/Cap - Peds 0.4 milliGRAM(s) Oral at bedtime    GI: Continue stool softeners.    pantoprazole    Tablet 40 milliGRAM(s) Oral before breakfast    Nutrition: Continue present diet  Endocrine and glucose control:   atorvastatin 40 milliGRAM(s) Oral at bedtime  dextrose 40% Gel 15 Gram(s) Oral once PRN  dextrose 50% Injectable 12.5 Gram(s) IV Push once  dextrose 50% Injectable 25 Gram(s) IV Push once  dextrose 50% Injectable 25 Gram(s) IV Push once  glucagon  Injectable 1 milliGRAM(s) IntraMuscular once PRN  insulin regular Infusion 1 Unit(s)/Hr IV Continuous <Continuous>    Renal: monitor urine output, supplement electrolytes as needed,     Vasc: Heparin SC and/or SCDs for DVT prophylaxis  aspirin  chewable 81 milliGRAM(s) Oral daily  heparin  Injectable 5000 Unit(s) SubCutaneous every 8 hours    ID: Stable, no fever , no chills.   cefepime   IVPB 1000 milliGRAM(s) IV Intermittent every 12 hours  vancomycin  IVPB 1000 milliGRAM(s) IV Intermittent once    Therapy: OOB/ambulate  Disposition: start planing discharge home or placement    Pertinent clinical, laboratory, radiographic, hemodynamic, echocardiographic, respiratory data, microbiologic data and chart were reviewed and analyzed frequently throughout the course of the day and night. GI and DVT prophylaxis, glycemic control, head of bed elevation and skin care issues were addressed.  Patient seen, examined and plan discussed with CT Surgery / CTICU team during rounds.    [x ] The patient remains in critical and unstable condition, and requires ICU care and monitoring  [ ] The patient is improving but requires continued monitoring and adjustment of therapy PROBLEMS:  I spent 45 minutes of critical care time examining patient, reviewing vitals, labs, medications, imaging and discussing with the team goals of care to prevent life-threatening in this patient who is at high risk for deterioration or death due to:    1.	Acute on Chronic systolic CHF with low EF - hold diureis today cr going up  continue  coeg 6.25 q12 and isordil 10 q8 and hydralazin as tolelated  2.	fluid overload - BiPAP, when needed on NC now but feel sob  cxr R effusion large on ultrasound  3.	hyperkalemia - monitor bmp   4.	leg pain - possible cellulitis -  cefepine   d/c vanco lyrica started yestrday ..less leg pain today   5.	RIC - monitor bmp  6.	enlarge gall bladder - ( patient asymptomatic ) sono showed sludge  7.	DM - high glucose - lantus   8.	DVT / PUD prophylaxis   9 [place] on ct atb Vanco and Cefpim  d/c vanco    10 - R effusion large draind by me during the round  Jeovanny tube cathter 14 f placed 1400 cc so far will send pt for CT chest  fluid sent for cytology and culturs and LDH and pr  PLAN  Neuro: move all 4 extremities. no sensory or motor deficits  Pain management.   ALPRAZolam 0.25 milliGRAM(s) Oral two times a day PRN  gabapentin 100 milliGRAM(s) Oral two times a day  morphine  - Injectable 2 milliGRAM(s) IV Push every 5 minutes PRN  QUEtiapine 25 milliGRAM(s) Oral at bedtime    Pulm: Wean off supplemental oxygen as able. Daily CXR. Encourage coughing, deep breathing and use of incentive spirometry.   tiotropium 18 MICROgram(s) Capsule 1 Capsule(s) Inhalation daily    Cardio: Monitor telemetry/alarms. Continue supportive care   aMIOdarone    Tablet 200 milliGRAM(s) Oral daily  buMETAnide 1 milliGRAM(s) Oral daily  carvedilol 3.125 milliGRAM(s) Oral every 12 hours  milrinone Infusion 0.375 MICROgram(s)/kG/Min IV Continuous <Continuous>  sacubitril 24 mG/valsartan 26 mG 1 Tablet(s) Oral two times a day  tamsulosin Oral Tab/Cap - Peds 0.4 milliGRAM(s) Oral at bedtime    GI: Continue stool softeners.    pantoprazole    Tablet 40 milliGRAM(s) Oral before breakfast    Nutrition: Continue present diet  Endocrine and glucose control:   atorvastatin 40 milliGRAM(s) Oral at bedtime  dextrose 40% Gel 15 Gram(s) Oral once PRN  dextrose 50% Injectable 12.5 Gram(s) IV Push once  dextrose 50% Injectable 25 Gram(s) IV Push once  dextrose 50% Injectable 25 Gram(s) IV Push once  glucagon  Injectable 1 milliGRAM(s) IntraMuscular once PRN  insulin regular Infusion 1 Unit(s)/Hr IV Continuous <Continuous>    Renal: monitor urine output, supplement electrolytes as needed,     Vasc: Heparin SC and/or SCDs for DVT prophylaxis  aspirin  chewable 81 milliGRAM(s) Oral daily  heparin  Injectable 5000 Unit(s) SubCutaneous every 8 hours    ID: Stable, no fever , no chills.   cefepime   IVPB 1000 milliGRAM(s) IV Intermittent every 12 hours  vancomycin  IVPB 1000 milliGRAM(s) IV Intermittent once    Therapy: OOB/ambulate  Disposition: start planing discharge home or placement    Pertinent clinical, laboratory, radiographic, hemodynamic, echocardiographic, respiratory data, microbiologic data and chart were reviewed and analyzed frequently throughout the course of the day and night. GI and DVT prophylaxis, glycemic control, head of bed elevation and skin care issues were addressed.  Patient seen, examined and plan discussed with CT Surgery / CTICU team during rounds.    [x ] The patient remains in critical and unstable condition, and requires ICU care and monitoring  [ ] The patient is improving but requires continued monitoring and adjustment of therapy

## 2019-11-06 NOTE — PROGRESS NOTE ADULT - SUBJECTIVE AND OBJECTIVE BOX
Interval history: Patient stable hemodynamically, s/p Pig tail placement to drain pleural effusion. She still can't bear weight on right foot but pain is better.         HPI:  69 y/o F with h/o CAD s/p CABG, ICM, chronic systolic heart failure with EF ~ 25% coming today with rash in RLE and pain. Patient can't bear any weight. The pain started suddenly. She also notes some SOB but this is not that significant compared to her baseline. She can walk minimally around the house before getting SOB. She denies PND or orthopnea but has pedal edema. No LOC, palpitations, bleeding episodes.       Patient admitted to CTU and started on broad spectrum Abx, started on iv diuretics. She reports feeling slightly better from her RLE pain.     PSH: CABG    Social: No ETOH or smoking

## 2019-11-07 LAB
ALBUMIN SERPL ELPH-MCNC: 3.3 G/DL — LOW (ref 3.5–5.2)
ALP SERPL-CCNC: 90 U/L — SIGNIFICANT CHANGE UP (ref 30–115)
ALT FLD-CCNC: 6 U/L — SIGNIFICANT CHANGE UP (ref 0–41)
ANION GAP SERPL CALC-SCNC: 13 MMOL/L — SIGNIFICANT CHANGE UP (ref 7–14)
ANION GAP SERPL CALC-SCNC: 14 MMOL/L — SIGNIFICANT CHANGE UP (ref 7–14)
ANION GAP SERPL CALC-SCNC: 16 MMOL/L — HIGH (ref 7–14)
AST SERPL-CCNC: 11 U/L — SIGNIFICANT CHANGE UP (ref 0–41)
BILIRUB SERPL-MCNC: <0.2 MG/DL — SIGNIFICANT CHANGE UP (ref 0.2–1.2)
BUN SERPL-MCNC: 82 MG/DL — CRITICAL HIGH (ref 10–20)
BUN SERPL-MCNC: 83 MG/DL — CRITICAL HIGH (ref 10–20)
BUN SERPL-MCNC: 93 MG/DL — CRITICAL HIGH (ref 10–20)
CALCIUM SERPL-MCNC: 9 MG/DL — SIGNIFICANT CHANGE UP (ref 8.5–10.1)
CALCIUM SERPL-MCNC: 9.2 MG/DL — SIGNIFICANT CHANGE UP (ref 8.5–10.1)
CALCIUM SERPL-MCNC: 9.3 MG/DL — SIGNIFICANT CHANGE UP (ref 8.5–10.1)
CHLORIDE SERPL-SCNC: 98 MMOL/L — SIGNIFICANT CHANGE UP (ref 98–110)
CHLORIDE SERPL-SCNC: 99 MMOL/L — SIGNIFICANT CHANGE UP (ref 98–110)
CHLORIDE SERPL-SCNC: 99 MMOL/L — SIGNIFICANT CHANGE UP (ref 98–110)
CO2 SERPL-SCNC: 21 MMOL/L — SIGNIFICANT CHANGE UP (ref 17–32)
CO2 SERPL-SCNC: 24 MMOL/L — SIGNIFICANT CHANGE UP (ref 17–32)
CO2 SERPL-SCNC: 25 MMOL/L — SIGNIFICANT CHANGE UP (ref 17–32)
CREAT SERPL-MCNC: 1.7 MG/DL — HIGH (ref 0.7–1.5)
CREAT SERPL-MCNC: 1.7 MG/DL — HIGH (ref 0.7–1.5)
CREAT SERPL-MCNC: 1.9 MG/DL — HIGH (ref 0.7–1.5)
GLUCOSE BLDC GLUCOMTR-MCNC: 229 MG/DL — HIGH (ref 70–99)
GLUCOSE BLDC GLUCOMTR-MCNC: 230 MG/DL — HIGH (ref 70–99)
GLUCOSE BLDC GLUCOMTR-MCNC: 233 MG/DL — HIGH (ref 70–99)
GLUCOSE BLDC GLUCOMTR-MCNC: 234 MG/DL — HIGH (ref 70–99)
GLUCOSE BLDC GLUCOMTR-MCNC: 276 MG/DL — HIGH (ref 70–99)
GLUCOSE BLDC GLUCOMTR-MCNC: 295 MG/DL — HIGH (ref 70–99)
GLUCOSE SERPL-MCNC: 229 MG/DL — HIGH (ref 70–99)
GLUCOSE SERPL-MCNC: 241 MG/DL — HIGH (ref 70–99)
GLUCOSE SERPL-MCNC: 274 MG/DL — HIGH (ref 70–99)
GRAM STN FLD: SIGNIFICANT CHANGE UP
HCT VFR BLD CALC: 27.2 % — LOW (ref 37–47)
HGB BLD-MCNC: 8.4 G/DL — LOW (ref 12–16)
MAGNESIUM SERPL-MCNC: 1.8 MG/DL — SIGNIFICANT CHANGE UP (ref 1.8–2.4)
MCHC RBC-ENTMCNC: 27.6 PG — SIGNIFICANT CHANGE UP (ref 27–31)
MCHC RBC-ENTMCNC: 30.9 G/DL — LOW (ref 32–37)
MCV RBC AUTO: 89.5 FL — SIGNIFICANT CHANGE UP (ref 81–99)
NRBC # BLD: 0 /100 WBCS — SIGNIFICANT CHANGE UP (ref 0–0)
PLATELET # BLD AUTO: 174 K/UL — SIGNIFICANT CHANGE UP (ref 130–400)
POTASSIUM SERPL-MCNC: 5.7 MMOL/L — HIGH (ref 3.5–5)
POTASSIUM SERPL-MCNC: 5.7 MMOL/L — HIGH (ref 3.5–5)
POTASSIUM SERPL-MCNC: 5.9 MMOL/L — HIGH (ref 3.5–5)
POTASSIUM SERPL-SCNC: 5.7 MMOL/L — HIGH (ref 3.5–5)
POTASSIUM SERPL-SCNC: 5.7 MMOL/L — HIGH (ref 3.5–5)
POTASSIUM SERPL-SCNC: 5.9 MMOL/L — HIGH (ref 3.5–5)
PREALB SERPL-MCNC: 17 MG/DL — LOW (ref 20–40)
PROCALCITONIN SERPL-MCNC: 0.82 NG/ML — HIGH (ref 0.02–0.1)
PROT SERPL-MCNC: 5.4 G/DL — LOW (ref 6–8)
RBC # BLD: 3.04 M/UL — LOW (ref 4.2–5.4)
RBC # FLD: 16.2 % — HIGH (ref 11.5–14.5)
SODIUM SERPL-SCNC: 136 MMOL/L — SIGNIFICANT CHANGE UP (ref 135–146)
SODIUM SERPL-SCNC: 136 MMOL/L — SIGNIFICANT CHANGE UP (ref 135–146)
SODIUM SERPL-SCNC: 137 MMOL/L — SIGNIFICANT CHANGE UP (ref 135–146)
SPECIMEN SOURCE: SIGNIFICANT CHANGE UP
WBC # BLD: 4.46 K/UL — LOW (ref 4.8–10.8)
WBC # FLD AUTO: 4.46 K/UL — LOW (ref 4.8–10.8)

## 2019-11-07 PROCEDURE — 99233 SBSQ HOSP IP/OBS HIGH 50: CPT | Mod: 25

## 2019-11-07 PROCEDURE — 71045 X-RAY EXAM CHEST 1 VIEW: CPT | Mod: 26

## 2019-11-07 PROCEDURE — 99024 POSTOP FOLLOW-UP VISIT: CPT

## 2019-11-07 RX ORDER — DEXTROSE 50 % IN WATER 50 %
50 SYRINGE (ML) INTRAVENOUS ONCE
Refills: 0 | Status: COMPLETED | OUTPATIENT
Start: 2019-11-07 | End: 2019-11-07

## 2019-11-07 RX ORDER — CALCIUM GLUCONATE 100 MG/ML
1 VIAL (ML) INTRAVENOUS ONCE
Refills: 0 | Status: COMPLETED | OUTPATIENT
Start: 2019-11-07 | End: 2019-11-07

## 2019-11-07 RX ORDER — DEXTROSE 50 % IN WATER 50 %
50 SYRINGE (ML) INTRAVENOUS ONCE
Refills: 0 | Status: DISCONTINUED | OUTPATIENT
Start: 2019-11-07 | End: 2019-11-07

## 2019-11-07 RX ORDER — SODIUM BICARBONATE 1 MEQ/ML
50 SYRINGE (ML) INTRAVENOUS ONCE
Refills: 0 | Status: COMPLETED | OUTPATIENT
Start: 2019-11-07 | End: 2019-11-07

## 2019-11-07 RX ORDER — INSULIN LISPRO 100/ML
5 VIAL (ML) SUBCUTANEOUS ONCE
Refills: 0 | Status: COMPLETED | OUTPATIENT
Start: 2019-11-07 | End: 2019-11-07

## 2019-11-07 RX ORDER — MAGNESIUM SULFATE 500 MG/ML
1 VIAL (ML) INJECTION ONCE
Refills: 0 | Status: COMPLETED | OUTPATIENT
Start: 2019-11-07 | End: 2019-11-07

## 2019-11-07 RX ORDER — DEXTROSE 10 % IN WATER 10 %
250 INTRAVENOUS SOLUTION INTRAVENOUS
Refills: 0 | Status: DISCONTINUED | OUTPATIENT
Start: 2019-11-07 | End: 2019-11-07

## 2019-11-07 RX ORDER — INSULIN HUMAN 100 [IU]/ML
10 INJECTION, SOLUTION SUBCUTANEOUS ONCE
Refills: 0 | Status: COMPLETED | OUTPATIENT
Start: 2019-11-07 | End: 2019-11-07

## 2019-11-07 RX ORDER — INSULIN LISPRO 100/ML
5 VIAL (ML) SUBCUTANEOUS ONCE
Refills: 0 | Status: COMPLETED | OUTPATIENT
Start: 2019-11-07 | End: 2019-11-06

## 2019-11-07 RX ADMIN — TIOTROPIUM BROMIDE 1 CAPSULE(S): 18 CAPSULE ORAL; RESPIRATORY (INHALATION) at 08:00

## 2019-11-07 RX ADMIN — Medication 10 MILLIGRAM(S): at 14:12

## 2019-11-07 RX ADMIN — Medication 50 MILLIGRAM(S): at 10:34

## 2019-11-07 RX ADMIN — Medication 10 MILLIGRAM(S): at 05:10

## 2019-11-07 RX ADMIN — SODIUM CHLORIDE 3 MILLILITER(S): 9 INJECTION INTRAMUSCULAR; INTRAVENOUS; SUBCUTANEOUS at 22:25

## 2019-11-07 RX ADMIN — INSULIN GLARGINE 20 UNIT(S): 100 INJECTION, SOLUTION SUBCUTANEOUS at 08:09

## 2019-11-07 RX ADMIN — Medication 50 MILLILITER(S): at 05:00

## 2019-11-07 RX ADMIN — CEFEPIME 100 MILLIGRAM(S): 1 INJECTION, POWDER, FOR SOLUTION INTRAMUSCULAR; INTRAVENOUS at 05:02

## 2019-11-07 RX ADMIN — Medication 4: at 11:30

## 2019-11-07 RX ADMIN — Medication 100 GRAM(S): at 05:01

## 2019-11-07 RX ADMIN — ISOSORBIDE DINITRATE 10 MILLIGRAM(S): 5 TABLET ORAL at 22:23

## 2019-11-07 RX ADMIN — Medication 10 MILLIGRAM(S): at 22:18

## 2019-11-07 RX ADMIN — SODIUM CHLORIDE 3 MILLILITER(S): 9 INJECTION INTRAMUSCULAR; INTRAVENOUS; SUBCUTANEOUS at 13:09

## 2019-11-07 RX ADMIN — Medication 650 MILLIGRAM(S): at 22:57

## 2019-11-07 RX ADMIN — Medication 5 UNIT(S): at 22:45

## 2019-11-07 RX ADMIN — CEFEPIME 100 MILLIGRAM(S): 1 INJECTION, POWDER, FOR SOLUTION INTRAMUSCULAR; INTRAVENOUS at 17:18

## 2019-11-07 RX ADMIN — HEPARIN SODIUM 5000 UNIT(S): 5000 INJECTION INTRAVENOUS; SUBCUTANEOUS at 22:22

## 2019-11-07 RX ADMIN — Medication 500 MILLIGRAM(S): at 12:05

## 2019-11-07 RX ADMIN — Medication 650 MILLIGRAM(S): at 06:13

## 2019-11-07 RX ADMIN — PANTOPRAZOLE SODIUM 40 MILLIGRAM(S): 20 TABLET, DELAYED RELEASE ORAL at 06:16

## 2019-11-07 RX ADMIN — Medication 100 GRAM(S): at 05:23

## 2019-11-07 RX ADMIN — Medication 3 MILLILITER(S): at 08:53

## 2019-11-07 RX ADMIN — CARVEDILOL PHOSPHATE 6.25 MILLIGRAM(S): 80 CAPSULE, EXTENDED RELEASE ORAL at 05:10

## 2019-11-07 RX ADMIN — SODIUM CHLORIDE 3 MILLILITER(S): 9 INJECTION INTRAMUSCULAR; INTRAVENOUS; SUBCUTANEOUS at 06:13

## 2019-11-07 RX ADMIN — METFORMIN HYDROCHLORIDE 1000 MILLIGRAM(S): 850 TABLET ORAL at 17:18

## 2019-11-07 RX ADMIN — Medication 6: at 07:30

## 2019-11-07 RX ADMIN — SENNA PLUS 1 TABLET(S): 8.6 TABLET ORAL at 05:10

## 2019-11-07 RX ADMIN — TAMSULOSIN HYDROCHLORIDE 0.4 MILLIGRAM(S): 0.4 CAPSULE ORAL at 22:25

## 2019-11-07 RX ADMIN — Medication 650 MILLIGRAM(S): at 22:28

## 2019-11-07 RX ADMIN — ISOSORBIDE DINITRATE 10 MILLIGRAM(S): 5 TABLET ORAL at 14:12

## 2019-11-07 RX ADMIN — HEPARIN SODIUM 5000 UNIT(S): 5000 INJECTION INTRAVENOUS; SUBCUTANEOUS at 05:11

## 2019-11-07 RX ADMIN — ZINC SULFATE TAB 220 MG (50 MG ZINC EQUIVALENT) 220 MILLIGRAM(S): 220 (50 ZN) TAB at 12:04

## 2019-11-07 RX ADMIN — ISOSORBIDE DINITRATE 10 MILLIGRAM(S): 5 TABLET ORAL at 05:11

## 2019-11-07 RX ADMIN — Medication 81 MILLIGRAM(S): at 12:05

## 2019-11-07 RX ADMIN — METFORMIN HYDROCHLORIDE 1000 MILLIGRAM(S): 850 TABLET ORAL at 05:10

## 2019-11-07 RX ADMIN — Medication 125 MILLIGRAM(S): at 05:11

## 2019-11-07 RX ADMIN — Medication 50 MILLIEQUIVALENT(S): at 05:01

## 2019-11-07 RX ADMIN — BUMETANIDE 1 MILLIGRAM(S): 0.25 INJECTION INTRAMUSCULAR; INTRAVENOUS at 05:10

## 2019-11-07 RX ADMIN — INSULIN HUMAN 10 UNIT(S): 100 INJECTION, SOLUTION SUBCUTANEOUS at 05:03

## 2019-11-07 RX ADMIN — Medication 4: at 16:56

## 2019-11-07 RX ADMIN — CARVEDILOL PHOSPHATE 6.25 MILLIGRAM(S): 80 CAPSULE, EXTENDED RELEASE ORAL at 17:18

## 2019-11-07 RX ADMIN — HEPARIN SODIUM 5000 UNIT(S): 5000 INJECTION INTRAVENOUS; SUBCUTANEOUS at 13:08

## 2019-11-07 RX ADMIN — Medication 3 MILLILITER(S): at 21:28

## 2019-11-07 RX ADMIN — ACETAZOLAMIDE 110 MILLIGRAM(S): 250 TABLET ORAL at 12:05

## 2019-11-07 RX ADMIN — Medication 125 MILLIGRAM(S): at 00:29

## 2019-11-07 RX ADMIN — Medication 650 MILLIGRAM(S): at 04:10

## 2019-11-07 RX ADMIN — Medication 3 MILLILITER(S): at 13:34

## 2019-11-07 RX ADMIN — ATORVASTATIN CALCIUM 40 MILLIGRAM(S): 80 TABLET, FILM COATED ORAL at 22:22

## 2019-11-07 NOTE — PROGRESS NOTE ADULT - ASSESSMENT
PROBLEMS:  I spent 45 minutes of critical care time examining patient, reviewing vitals, labs, medications, imaging and discussing with the team goals of care to prevent life-threatening in this patient who is at high risk for deterioration or death due to:    1.	Acute on Chronic systolic CHF with low EF - hold diureis today  continue  coeg 6.25 q12 and isordil 10 q8 and hydralazin as tolelated  2.	R loer lobe and middle lobe infiltrate on Ct   3.	Amio held yestrday  prdnison 50 daily  4.	fluid overload -, . on daily Bumex will hold due to elevated BUn ...( could be due to steroid that was started yesterday  5.	hyperkalemia - monitor bmp this afternoon  6.	leg pain - possible cellulitis -  cefepine   d/c vanco lyrica started yestrday ..less leg pain today   7.	RIC - monitor bmp  8.	chronic montejo cathter  9.	DM - high glucose - lantus   10.	DVT / PUD prophylaxis   9 P[place] on ct atb Vanco and Cefpim  d/c vanco    10 - R effusion large draind by me during the round  Jeovanny tube cathter 14 f placed 1400 cc so far will send pt for CT chest  fluid sent for cytology and culturs and LDH and pr  PLAN  Neuro: move all 4 extremities. no sensory or motor deficits  Pain management.   ALPRAZolam 0.25 milliGRAM(s) Oral two times a day PRN  gabapentin 100 milliGRAM(s) Oral two times a day  morphine  - Injectable 2 milliGRAM(s) IV Push every 5 minutes PRN  QUEtiapine 25 milliGRAM(s) Oral at bedtime    Pulm: Wean off supplemental oxygen as able. Daily CXR. Encourage coughing, deep breathing and use of incentive spirometry.   tiotropium 18 MICROgram(s) Capsule 1 Capsule(s) Inhalation daily    Cardio: Monitor telemetry/alarms. Continue supportive care   aMIOdarone    Tablet 200 milliGRAM(s) Oral daily  buMETAnide 1 milliGRAM(s) Oral daily  carvedilol 3.125 milliGRAM(s) Oral every 12 hours  milrinone Infusion 0.375 MICROgram(s)/kG/Min IV Continuous <Continuous>  sacubitril 24 mG/valsartan 26 mG 1 Tablet(s) Oral two times a day  tamsulosin Oral Tab/Cap - Peds 0.4 milliGRAM(s) Oral at bedtime    GI: Continue stool softeners.    pantoprazole    Tablet 40 milliGRAM(s) Oral before breakfast    Nutrition: Continue present diet  Endocrine and glucose control:   atorvastatin 40 milliGRAM(s) Oral at bedtime  dextrose 40% Gel 15 Gram(s) Oral once PRN  dextrose 50% Injectable 12.5 Gram(s) IV Push once  dextrose 50% Injectable 25 Gram(s) IV Push once  dextrose 50% Injectable 25 Gram(s) IV Push once  glucagon  Injectable 1 milliGRAM(s) IntraMuscular once PRN  insulin regular Infusion 1 Unit(s)/Hr IV Continuous <Continuous>    Renal: monitor urine output, supplement electrolytes as needed,     Vasc: Heparin SC and/or SCDs for DVT prophylaxis  aspirin  chewable 81 milliGRAM(s) Oral daily  heparin  Injectable 5000 Unit(s) SubCutaneous every 8 hours    ID: Stable, no fever , no chills.   cefepime   IVPB 1000 milliGRAM(s) IV Intermittent every 12 hours  vancomycin  IVPB 1000 milliGRAM(s) IV Intermittent once    Therapy: OOB/ambulate  Disposition: start planing discharge home or placement    Pertinent clinical, laboratory, radiographic, hemodynamic, echocardiographic, respiratory data, microbiologic data and chart were reviewed and analyzed frequently throughout the course of the day and night. GI and DVT prophylaxis, glycemic control, head of bed elevation and skin care issues were addressed.  Patient seen, examined and plan discussed with CT Surgery / CTICU team during rounds.    [x ] The patient remains in critical and unstable condition, and requires ICU care and monitoring  [ ] The patient is improving but requires continued monitoring and adjustment of therapy

## 2019-11-07 NOTE — PROGRESS NOTE ADULT - ASSESSMENT
69 y/o F with h/o CAD s/p CABG, ICM, chronic systolic heart failure with EF ~ 25% coming today with rash in RLE and pain. Patient can't bear any weight. The pain started suddenly. She also notes some SOB but this is not that significant compared to her baseline. She can walk minimally around the house before getting SOB. She denies PND or orthopnea but has pedal edema.       PROBLEMS  1. Suspected Gram negative pneumonia    New problem with additional W/U  acute illness with systemic symptoms     CT with dense consolidative opacity in the right lower lobe and right middle lobe,   On cefepime   IVPB 1000 milliGRAM(s) IV Intermittent every 12 hours    Nasal MRSA PCR negative  Urine Legionella Antigen negative  11/2 Cxray with increasing right base opacity and stable left base opacity    2. CT with  right-sided pleural effusion and small left pleural effusion.  S/P thoracentesis    3. Atelectasis on CT    4. Resolving RIC    5. Alkalosis      PLAN  - Continue Cefepime      Await Bronch results 69 y/o F with h/o CAD s/p CABG, ICM, chronic systolic heart failure with EF ~ 25% coming today with rash in RLE and pain. Patient can't bear any weight. The pain started suddenly. She also notes some SOB but this is not that significant compared to her baseline. She can walk minimally around the house before getting SOB. She denies PND or orthopnea but has pedal edema.       PROBLEMS  1. Suspected Gram negative pneumonia    New problem with additional W/U  acute illness with systemic symptoms     CT with dense consolidative opacity in the right lower lobe and right middle lobe,   On cefepime   IVPB 1000 milliGRAM(s) IV Intermittent every 12 hours    Nasal MRSA PCR negative  Urine Legionella Antigen negative  11/2 Cxray with increasing right base opacity and stable left base opacity  Sputum C&S Nl Ewa    2. CT with  right-sided pleural effusion and small left pleural effusion.  S/P thoracentesis    3. Atelectasis on CT    4. Resolving RIC    5. Alkalosis      PLAN  - Continue Cefepime

## 2019-11-07 NOTE — PROGRESS NOTE ADULT - SUBJECTIVE AND OBJECTIVE BOX
CTU Attending Progress Daily Note     07 Nov 2019 11:17  POD# -   He has history of Female bladder prolapse  Diabetes    Interval event for past 24 hr:  GUS WILBURN  68y had no event.   Current Complains:  GUS WILBURN has no new complains  HPI:  69 y/o F with h/o CAD s/p CABG, ICM, chronic systolic heart failure with EF ~ 25% coming today with rash in RLE and pain. Patient can't bear any weight. The pain started suddenly. She also notes some SOB but this is not that significant compared to her baseline. She can walk minimally around the house before getting SOB. She denies PND or orthopnea but has pedal edema. No LOC, palpitations, bleeding episodes. (03 Nov 2019 07:05)    OBJECTIVE:  ICU Vital Signs Last 24 Hrs  T(C): 36.8 (07 Nov 2019 08:00), Max: 36.8 (07 Nov 2019 08:00)  T(F): 98.2 (07 Nov 2019 08:00), Max: 98.2 (07 Nov 2019 08:00)  HR: 75 (07 Nov 2019 10:00) (75 - 90)  BP: 118/56 (07 Nov 2019 10:00) (85/47 - 144/63)  BP(mean): 79 (07 Nov 2019 10:00) (63 - 90)  ABP: --  ABP(mean): --  RR: 20 (07 Nov 2019 10:00) (15 - 30)  SpO2: 100% (07 Nov 2019 10:00) (98% - 100%)    I&O's Summary    06 Nov 2019 07:01  -  07 Nov 2019 07:00  --------------------------------------------------------  IN: 3000 mL / OUT: 2545 mL / NET: 455 mL    07 Nov 2019 07:01  -  07 Nov 2019 11:17  --------------------------------------------------------  IN: 120 mL / OUT: 280 mL / NET: -160 mL      I&O's Detail    06 Nov 2019 07:01  -  07 Nov 2019 07:00  --------------------------------------------------------  IN:    Albumin 5%  - 500 mL: 500 mL    Glucerna 1.5: 960 mL    IV PiggyBack: 400 mL    Oral Fluid: 1140 mL  Total IN: 3000 mL    OUT:    Chest Tube: 2000 mL    Indwelling Catheter - Urethral: 545 mL  Total OUT: 2545 mL    Total NET: 455 mL      07 Nov 2019 07:01  -  07 Nov 2019 11:17  --------------------------------------------------------  IN:    Oral Fluid: 120 mL  Total IN: 120 mL    OUT:    Chest Tube: 5 mL    Indwelling Catheter - Urethral: 275 mL  Total OUT: 280 mL    Total NET: -160 mL        Adult Advanced Hemodynamics Last 24 Hrs  CVP(mm Hg): --  CVP(cm H2O): --  CO: --  CI: --  PA: --  PA(mean): --  PCWP: --  SVR: --  SVRI: --  PVR: --  PVRI: --    CAPILLARY BLOOD GLUCOSE  165 (06 Nov 2019 16:33)  104 (06 Nov 2019 12:00)      POCT Blood Glucose.: 229 mg/dL (07 Nov 2019 10:52)  POCT Blood Glucose.: 276 mg/dL (07 Nov 2019 06:36)  POCT Blood Glucose.: 234 mg/dL (07 Nov 2019 05:06)  POCT Blood Glucose.: 233 mg/dL (07 Nov 2019 00:25)  POCT Blood Glucose.: 270 mg/dL (06 Nov 2019 22:53)  POCT Blood Glucose.: 165 mg/dL (06 Nov 2019 16:33)  POCT Blood Glucose.: 104 mg/dL (06 Nov 2019 12:02)    LABS:                          8.4    4.46  )-----------( 174      ( 07 Nov 2019 02:50 )             27.2     11-07    137  |  99  |  83<HH>  ----------------------------<  274<H>  5.7<H>   |  24  |  1.7<H>    Ca    9.2      07 Nov 2019 06:35  Mg     1.8     11-07    TPro  5.4<L>  /  Alb  3.3<L>  /  TBili  <0.2  /  DBili  x   /  AST  11  /  ALT  6   /  AlkPhos  90  11-07          Culture - Body Fluid with Gram Stain (collected 06 Nov 2019 09:10)  Source: .Body Fluid Pleural fluid  Gram Stain (07 Nov 2019 05:50):    polymorphonuclear leukocytes seen    No organisms seen    by cytocentrifuge    Culture - Sputum (collected 05 Nov 2019 09:25)  Source: .Sputum Sputum  Gram Stain (06 Nov 2019 13:53):    Rare polymorphonuclear leukocytes per low power field    Rare Squamous epithelial cells per low power field    Few Yeast like cells      Home Medications:  acetaminophen 325 mg oral tablet: 2 tab(s) orally every 6 hours, As needed, Mild Pain (1 - 3) (18 Oct 2019 14:37)    HOSPITAL MEDICATIONS:  MEDICATIONS  (STANDING):  acetaZOLAMIDE  IVPB 500 milliGRAM(s) IV Intermittent daily  albuterol/ipratropium for Nebulization 3 milliLiter(s) Nebulizer every 6 hours  ascorbic acid 500 milliGRAM(s) Oral daily  aspirin  chewable 81 milliGRAM(s) Oral daily  atorvastatin 40 milliGRAM(s) Oral at bedtime  carvedilol 6.25 milliGRAM(s) Oral every 12 hours  cefepime   IVPB 1000 milliGRAM(s) IV Intermittent every 12 hours  dextrose 5%. 1000 milliLiter(s) (50 mL/Hr) IV Continuous <Continuous>  dextrose 50% Injectable 12.5 Gram(s) IV Push once  dextrose 50% Injectable 25 Gram(s) IV Push once  dextrose 50% Injectable 25 Gram(s) IV Push once  heparin  Injectable 5000 Unit(s) SubCutaneous every 8 hours  hydrALAZINE 10 milliGRAM(s) Oral every 8 hours  insulin glargine Injectable (LANTUS) 20 Unit(s) SubCutaneous every morning  insulin lispro (HumaLOG) corrective regimen sliding scale   SubCutaneous three times a day before meals  isosorbide   dinitrate Tablet (ISORDIL) 10 milliGRAM(s) Oral three times a day  metFORMIN 1000 milliGRAM(s) Oral every 12 hours  pantoprazole    Tablet 40 milliGRAM(s) Oral before breakfast  polyethylene glycol 3350 17 Gram(s) Oral every 24 hours  predniSONE   Tablet 50 milliGRAM(s) Oral daily  senna 1 Tablet(s) Oral every 12 hours  sodium chloride 0.9% lock flush 3 milliLiter(s) IV Push every 8 hours  tamsulosin Oral Tab/Cap - Peds 0.4 milliGRAM(s) Oral at bedtime  tiotropium 18 MICROgram(s) Capsule 1 Capsule(s) Inhalation daily  zinc sulfate 220 milliGRAM(s) Oral daily    MEDICATIONS  (PRN):  acetaminophen   Tablet .. 650 milliGRAM(s) Oral every 6 hours PRN Mild Pain (1 - 3)  dextrose 40% Gel 15 Gram(s) Oral once PRN Blood Glucose LESS THAN 70 milliGRAM(s)/deciliter  glucagon  Injectable 1 milliGRAM(s) IntraMuscular once PRN Glucose LESS THAN 70 milligrams/deciliter      REVIEW OF SYSTEMS:  CONSTITUTIONAL: [X] all negative; [ ] weakness, [ ] fevers, [ ] chills  EYES/ENT: [X] all negative; [ ] visual changes, [ ] vertigo, [ ] throat pain   NECK: [X] all negative; [ ] pain, [ ] stiffness  RESPIRATORY: [] all negative, [ ] cough, [ ] wheezing, [ ] hemoptysis, [ ] shortness of breath  CARDIOVASCULAR: [] all negative; [ ] chest pain, [ ] palpitations, [ ] orthopnea  GASTROINTESTINAL: [X] all negative; [ ]abdominal pain, [ ] nausea, [ ] vomiting, [ ] hematemesis, [ ] diarrhea, [ ] constipation, [ ] melena, [ ] hematochezia.  GENITOURINARY: [X] all negative; [ ] dysuria, [ ] frequency, [ ] hematuria  NEUROLOGICAL: [X] all negative; [ ] numbness, [ ] weakness  SKIN: [X] all negative; [ ] itching, [ ] burning, [ ] rashes, [ ] lesions   All other review of systems is negative unless indicated above.    [  ] Unable to assess ROS because     PHYSICAL EXAM:          CONSTITUTIONAL: Well-developed; well-nourished; in no acute distress.   	SKIN: warm, dry  	HEAD: Normocephalic; atraumatic.  	EYES: PERRL, EOM, no conj injection, sclera clear  	ENT: No nasal discharge; airway clear.  	NECK: Supple; non tender.  No midline ttp ctls  	CARD: S1, S2 normal; no murmurs, gallops, or rubs. Regular rate and rhythm. 2+ RPs and DPs bilat, no carotid bruits, no pedal   edema, no calf pain b/l  	RESP: CTA  bilat good air movement No wheezes, rales or rhonchi.  	ABD: Soft, not tender, not distended, no CVA ttp no rebound or guarding, bowel sounds present  	EXT: Normal ROM.  No clubbing, cyanosis or edema.   	  	NEURO: Alert, awake, motor 5/5 R, 5/5 L        RADIOLOGY:  xray    I spent 45 minutes of critical care time examining patient, reviewing vitals, labs, medications, imaging and discussing with the team goals of care to prevent life-threatening in this patient who is at high risk for deterioration or death due to:

## 2019-11-07 NOTE — CHART NOTE - NSCHARTNOTEFT_GEN_A_CORE
Calorie Count 11/5-11/6 ordered    Day 1: 968kcal, 64g Protein  Day 2: 590kcal, 21g Protein. Dinner not documented  -Third day 11/7 (not ordered) was filled out for breakfast: 447kcal, 28g Protein  --Family member reports pt eats significantly better when fed by family.     Follow up Nutrition Support Team.

## 2019-11-07 NOTE — PROGRESS NOTE ADULT - SUBJECTIVE AND OBJECTIVE BOX
GUS WILBURN  68y, Female  Allergy: No Known Allergies      CHIEF COMPLAINT: Shortness of breath, leg pain (2019 11:17)      INTERVAL EVENTS/HPI  - No acute events overnight  - T(F): , Max: 98.2 (19 @ 08:00)  - Denies any worsening symptoms  - Tolerating medication  - WBC Count: 4.46 K/uL (19 @ 02:50)      ROS  General: Denies fevers, chills, nightsweats, weight loss  HEENT: Denies headache, rhinorrhea, sore throat, eye pain  CV: Denies CP, palpitations  PULM: Denies SOB, cough  GI: Denies abdominal pain, diarrhea  : Denies dysuria, hematuria  MSK: Denies arthralgias  SKIN: Denies rash   NEURO: Denies paresthesias, weakness  PSYCH: Denies depression    FH non-contributory   Social Hx non-contributory    VITALS:  T(F): 98.2, Max: 98.2 (19 @ 08:00)  HR: 80  BP: 109/55  RR: 16Vital Signs Last 24 Hrs  T(C): 36.8 (2019 12:00), Max: 36.8 (2019 08:00)  T(F): 98.2 (2019 12:00), Max: 98.2 (2019 08:00)  HR: 80 (2019 13:00) (75 - 90)  BP: 109/55 (2019 13:00) (85/47 - 144/63)  BP(mean): 79 (2019 13:00) (63 - 90)  RR: 16 (2019 13:00) (15 - 30)  SpO2: 100% (2019 13:00) (98% - 100%)    PHYSICAL EXAM:  Gen: NAD, resting in bed  HEENT: Normocephalic, atraumatic  Neck: supple, no lymphadenopathy  CV: Regular rate & regular rhythm  Lungs: decreased BS at bases, no fremitus  Abdomen: Soft, BS present  Ext: Warm, well perfused  Neuro: non focal, awake  Skin: no rash, no erythema      TESTS & MEASUREMENTS:                        8.4    4.46  )-----------( 174      ( 2019 02:50 )             27.2         137  |  99  |  83<HH>  ----------------------------<  274<H>  5.7<H>   |  24  |  1.7<H>    Ca    9.2      2019 06:35  Mg     1.8         TPro  5.4<L>  /  Alb  3.3<L>  /  TBili  <0.2  /  DBili  x   /  AST  11  /  ALT  6   /  AlkPhos  90      eGFR if Non African American: 31 mL/min/1.73M2 (19 @ 06:35)  eGFR if African American: 36 mL/min/1.73M2 (19 @ 06:35)  eGFR if Non African American: 31 mL/min/1.73M2 (19 @ 02:50)  eGFR if African American: 36 mL/min/1.73M2 (19 @ 02:50)    LIVER FUNCTIONS - ( 2019 02:50 )  Alb: 3.3 g/dL / Pro: 5.4 g/dL / ALK PHOS: 90 U/L / ALT: 6 U/L / AST: 11 U/L / GGT: x               Culture - Body Fluid with Gram Stain (collected 19 @ 09:10)  Source: .Body Fluid Pleural fluid  Gram Stain (19 @ 05:50):    polymorphonuclear leukocytes seen    No organisms seen    by cytocentrifuge    Culture - Sputum (collected 19 @ 09:25)  Source: .Sputum Sputum  Gram Stain (19 @ 13:53):    Rare polymorphonuclear leukocytes per low power field    Rare Squamous epithelial cells per low power field    Few Yeast like cells  Preliminary Report (19 @ 12:37):    Normal Respiratory Ewa present            INFECTIOUS DISEASES TESTING  MRSA PCR Result.: Negative (19 @ 22:00)  Legionella Antigen, Urine: Negative (19 @ 16:30)  MRSA PCR Result.: Negative (10-06-19 @ 20:00)  Hepatitis C Virus Interpretation: Nonreact (19 @ 05:59)      RADIOLOGY & ADDITIONAL TESTS:  I have personally reviewed the last Chest xray  CXR  Xray Chest 1 View- PORTABLE-Urgent:   EXAM:  XR CHEST PORTABLE URGENT 1V            PROCEDURE DATE:  2019            INTERPRETATION:  Clinical History / Reason for exam: Shortness of breath   since, status post bronchoscopy    Comparison : Chest radiograph 2019 at 4:19 AM.    Technique/Positioning: Frontal radiograph of the chest.    Findings:    Support devices: None.    Cardiac/mediastinum/hilum: Stable.    Lung parenchyma/Pleura: Bilateral opacities and effusions, increased on   the right. No definite pneumothorax.    Skeleton/soft tissues: Stable.    Impression:      Bilateral opacities and effusions, increased on the right.                      AISHWARYA CAUSEY M.D., ATTENDING RADIOLOGIST  This document has been electronically signed. 2019  2:47PM             (19 @ 14:01)      CT  CT Chest No Cont:   EXAM:  CT CHEST            PROCEDURE DATE:  2019            INTERPRETATION:  CLINICAL INDICATION: CABG, PLEURAL EFFUSION    TECHNIQUE:  A noncontrast CT of the thorax was performed. Sagittal and   coronal reformats were performed as well as MIP reconstructions.    COMPARISON CT: CT chest dated 2019    INTERPRETATION:    AIRWAYS, LUNGS AND PLEURA: The central tracheobronchial tree is patent.   Interval placement of a right-sided pleural catheter. Decreased right   pleural effusion. Improved aeration in the right lung with new patchy and   groundglass opacities. Residual partial collapse of the right lower lobe.   Stable small-to-moderate left pleural effusion with adjacent partial   collapse of the left lower lobe. New small rightpneumothorax.     MEDIASTINUM: There are no enlarged mediastinal, hilar or axillary lymph   nodes. The visualized portion of the thyroid gland is unremarkable.    HEART AND VESSELS: The heart is enlarged. There is evidence of anemia.   Status post CABG. Left atrial appendage device There is no pericardial   effusion.    UPPER ABDOMEN: Images of the upper abdomen demonstrate perihepatic fluid,   calcified splenic aneurysm. Bilateral adrenal gland thickening.    BONES AND SOFT TISSUES: There are degenerative changes of the spine. Post   median sternotomy. The soft tissues are unremarkable.    IMPRESSION:    Since 2019,  Interval placement of a right-sided pleural catheter. Decreased right   pleural effusion. Improved aeration in the right lung with new patchy and   groundglass opacities. Residual partial collapse of the right lower lobe.    New small right pneumothorax.     Stable small-to-moderate left pleural effusion with adjacent partial   collapse of the left lower lobe.                       JEFF TERRAZAS M.D., ATTENDING RADIOLOGIST  This document has been electronically signed. 2019 12:21PM             (19 @ 11:38)      CARDIOLOGY TESTING  Transthoracic Echocardiogram:    EXAM:  2-D ECHO (TTE) COMPLETE        PROCEDURE DATE:  2019      INTERPRETATION:  REPORT:    TRANSTHORACIC ECHOCARDIOGRAM REPORT         Patient Name:   GUS WILBURN Accession #: [id number]  Medical Rec #:  [id number]    Height:      63.0 in 160.0 cm  YOB: 1950  Weight:      124.0 lb 56.25 kg  Patient Age:    68 years    BSA:         1.58 m²  Patient Gender: F           BP:          116/55 mmHg       Date of Exam:        2019 7:18:13 AM  Referring Physician: [id number] ENRESTO CATALAN  Sonographer:         Isidro Polk  Reading Physician:   Spring Arrington M.D.    Procedure:   2D Echo/Doppler/Color Doppler Complete.  Indications: I50.9 - Heart Failure, unspecified  Diagnosis:   Heart failure, unspecified - I50.9         Summary:   1. Severely decreased global left ventricular systolic function.   2. LV Ejection Fraction by Renner's Method with a biplane EF of 27 %.   3. Increased LV wall thickness.   4. Normal left ventricular internal cavity size.   5. Normal right atrial size.   6. Small pericardial effusion.   7. Mild thickening and calcification of the anterior and posterior   mitral valve leaflets.   8. Mild to moderate mitral annular calcification.   9. Moderate aortic valve stenosis.    PHYSICIAN INTERPRETATION:  Left Ventricle: The left ventricular internal cavity size is normal. Left   ventricular wall thickness is increased. Global LV systolic function was   severely decreased.  Right Ventricle: The right ventricular size is normal. RV systolic   function is normal.  Left Atrium: Mildly enlarged left atrium.  Right Atrium: Normal right atrial size.  Pericardium: A small pericardial effusion is present.  Mitral Valve: Mild thickening and calcification of the anterior and   posterior mitral valve leaflets. There is mild to moderate mitral annular   calcification. Moderate mitral valve regurgitation is seen.  Tricuspid Valve: The tricuspid valve is normal in structure. Mild   tricuspid regurgitation is visualized.  Aortic Valve: The aortic valve is trileaflet. Moderate aortic stenosis is   present. Peak transaortic gradient equals 23.6 mmHg, mean transaortic   gradient equals 9.1 mmHg, the calculated aortic valve area equals 1.18   cm2 No evidence of aortic valve regurgitation is seen.  PulmonicValve: The pulmonic valve is thickened with good excursion.   Trace pulmonic valve regurgitation.  Aorta: The aortic root is normal in size and structure.  Venous: The inferior vena cava was dilated, with respiratory size   variation less than 50%.      2D AND M-MODE MEASUREMENTS (normal ranges within parentheses):  Left                  Normal   Aorta/Left             Normal  Ventricle:                     Atrium:  IVSd (2D):  0.97 cm  (0.7-1.1) AoV Cusp       1.31  (1.5-2.6)  LVPWd (2D): 1.16 cm  (0.7-1.1) Separation:     cm  LVIDd (2D): 4.13 cm  (3.4-5.7) Left Atrium    4.38  (1.9-4.0)  LVIDs (2D): 3.64 cm            (Mmode):        cm  LV FS (2D):  11.8 %   (>25%)   LA Volume      54.2  IVSd        1.23 cm  (0.7-1.1) Index         ml/m²  (Mmode):                       Right  LVPWd       1.10 cm  (0.7-1.1) Ventricle:  (Mmode):                       RVd (2D):      2.83 cm  LVIDd       4.71 cm  (3.4-5.7)  (Mmode):  LVIDs       3.90 cm  (Mmode):  LV FS        17.0 %   (>25%)  (Mmode):  Relative      0.56    (<0.42)  Wall  Thickness  Rel. Wall     0.47    (<0.42)  Thickness  Mm  LV Mass      129.5  Index:        g/m²  Mmode    SPECTRAL DOPPLER ANALYSIS:  LV DIASTOLIC FUNCTION:  MV Peak E: 1.22 m/s Decel Time: 161 msec  MV Peak A: 0.99m/s  E/A Ratio: 1.24    Aortic Valve:  AoV VMax:    2.43 m/s  AoV Area, Vmax: 1.24 cm² Vmax Indx: 0.79 cm²/m²  AoV VTI:     0.39 m    AoV Area, VTI:  1.18 cm² VTI Indx:  0.75 cm²/m²  AoV Pk Grad: 23.6 mmHg  AoV Mn Grad: 9.1 mmHg    LVOT Vmax: 1.03 m/s  LVOT VTI:  0.16 m  LVOT Diam: 1.93 cm    Mitral Valve:  MV P1/2 Time: 46.68 msec  MV Area, PHT: 4.71 cm²    Tricuspid Valve and PA/RV Systolic Pressure: TR Max Velocity: 2.66 m/s RA   Pressure:  RVSP/PASP: 29.9 mmHg    Pulmonic Valve:  PV Max Velocity: 1.24 m/s PV Max P.2 mmHg PV Mean PG:       [id number] Spring Arrington M.D., Electronically signed on 2019 at 9:01:17   AM              *** Final ***                    SPRING ARRINGTON MD  This document has been electronically signed. 2019  7:18AM             (19 @ 07:18)  12 Lead ECG:   Ventricular Rate 88 BPM    Atrial Rate 88 BPM    P-R Interval 190 ms    QRS Duration 124 ms    Q-T Interval 386 ms    QTC Calculation(Bezet) 467 ms    P Axis 69 degrees    R Axis 165 degrees    T Axis 42 degrees    Diagnosis Line Normal sinus rhythm  Left posterior fascicular block  ST & T wave abnormality, consider inferolateral ischemia  Abnormal ECG    Confirmed by Jovany Dan ([id number]) on 10/31/2019 11:07:01 PM (10-31-19 @ 21:51)      MEDICATIONS  acetaZOLAMIDE  IVPB 500  albuterol/ipratropium for Nebulization 3  ascorbic acid 500  aspirin  chewable 81  atorvastatin 40  carvedilol 6.25  cefepime   IVPB 1000  dextrose 5%. 1000  dextrose 50% Injectable 12.5  dextrose 50% Injectable 25  dextrose 50% Injectable 25  heparin  Injectable 5000  hydrALAZINE 10  insulin glargine Injectable (LANTUS) 20  insulin lispro (HumaLOG) corrective regimen sliding scale   isosorbide   dinitrate Tablet (ISORDIL) 10  metFORMIN 1000  pantoprazole    Tablet 40  polyethylene glycol 3350 17  predniSONE   Tablet 50  senna 1  sodium chloride 0.9% lock flush 3  tamsulosin Oral Tab/Cap - Peds 0.4  tiotropium 18 MICROgram(s) Capsule 1  zinc sulfate 220      ANTIBIOTICS:  cefepime   IVPB 1000 milliGRAM(s) IV Intermittent every 12 hours      All available historical data has been reviewed

## 2019-11-07 NOTE — PROGRESS NOTE ADULT - SUBJECTIVE AND OBJECTIVE BOX
SURGEON(s): CHINA Escobar  SUBJECTIVE ASSESSMENT:  Patient complains of right LE pain and tenderness; pt complains of fatigue  Vital Signs Last 24 Hrs  T(F): 98.2 (07 Nov 2019 08:00), Max: 98.2 (07 Nov 2019 08:00)  HR: 79 (07 Nov 2019 08:00) (78 - 90)  BP: 104/65 (07 Nov 2019 08:00) (85/47 - 144/63)  BP(mean): 63 (07 Nov 2019 08:00) (63 - 90)  RR: 25 (07 Nov 2019 08:00) (15 - 30)  SpO2: 100% (07 Nov 2019 08:00) (98% - 100%) [facility]      I&O's Detail    06 Nov 2019 07:01  -  07 Nov 2019 07:00  --------------------------------------------------------  IN:    Albumin 5%  - 500 mL: 500 mL    Glucerna 1.5: 960 mL    IV PiggyBack: 400 mL    Oral Fluid: 1140 mL  Total IN: 3000 mL    OUT:    Chest Tube: 2000 mL    Indwelling Catheter - Urethral: 545 mL  Total OUT: 2545 mL        Net:   I&O's Detail    05 Nov 2019 07:01  -  06 Nov 2019 07:00  --------------------------------------------------------  Total NET: 825 mL      06 Nov 2019 07:01  -  07 Nov 2019 07:00  --------------------------------------------------------  Total NET: 455 mL        CAPILLARY BLOOD GLUCOSE  165 (06 Nov 2019 16:33)  104 (06 Nov 2019 12:00)      POCT Blood Glucose.: 276 mg/dL (07 Nov 2019 06:36)  POCT Blood Glucose.: 234 mg/dL (07 Nov 2019 05:06)  POCT Blood Glucose.: 233 mg/dL (07 Nov 2019 00:25)  POCT Blood Glucose.: 270 mg/dL (06 Nov 2019 22:53)  POCT Blood Glucose.: 165 mg/dL (06 Nov 2019 16:33)  POCT Blood Glucose.: 104 mg/dL (06 Nov 2019 12:02)    Physical Exam:  General: NAD; A&Ox3  Cardiac: S1/S2, RRR, no murmur, no rubs  Lungs: unlabored respirations, CTA b/l, no wheeze, no rales, no crackles  Abdomen: Soft/NT/ND; positive bowel sounds x 4  Sternum: Intact, no click, incision healing well with no drainage  Incisions: Incisions clean/dry/intact  Extremities: No edema b/l lower extremities; good capillary refill; no cyanosis; palpable 1+ pedal pulses b/l      LABS:                        8.4<L>  4.46<L> )-----------( 174      ( 07 Nov 2019 02:50 )             27.2<L>                        8.0<L>  5.37  )-----------( 166      ( 06 Nov 2019 02:55 )             26.1<L>    11-07    137  |  99  |  83<HH>  ----------------------------<  274<H>  5.7<H>   |  24  |  1.7<H>  11-07    136  |  99  |  82<HH>  ----------------------------<  241<H>  5.9<H>   |  21  |  1.7<H>    Ca    9.2      07 Nov 2019 06:35  Mg     1.8     11-07    TPro  5.4<L> [6.0 - 8.0]  /  Alb  3.3<L> [3.5 - 5.2]  /  TBili  <0.2 [0.2 - 1.2]  /  DBili  x   /  AST  11 [0 - 41]  /  ALT  6 [0 - 41]  /  AlkPhos  90 [30 - 115]  11-07    MEDICATIONS  (STANDING):  acetaZOLAMIDE  IVPB 500 milliGRAM(s) IV Intermittent daily  albuterol/ipratropium for Nebulization 3 milliLiter(s) Nebulizer every 6 hours  ascorbic acid 500 milliGRAM(s) Oral daily  aspirin  chewable 81 milliGRAM(s) Oral daily  atorvastatin 40 milliGRAM(s) Oral at bedtime  carvedilol 6.25 milliGRAM(s) Oral every 12 hours  cefepime   IVPB 1000 milliGRAM(s) IV Intermittent every 12 hours  dextrose 5%. 1000 milliLiter(s) (50 mL/Hr) IV Continuous <Continuous>  dextrose 50% Injectable 12.5 Gram(s) IV Push once  dextrose 50% Injectable 25 Gram(s) IV Push once  dextrose 50% Injectable 25 Gram(s) IV Push once  heparin  Injectable 5000 Unit(s) SubCutaneous every 8 hours  hydrALAZINE 10 milliGRAM(s) Oral every 8 hours  insulin glargine Injectable (LANTUS) 20 Unit(s) SubCutaneous every morning  insulin lispro (HumaLOG) corrective regimen sliding scale   SubCutaneous three times a day before meals  isosorbide   dinitrate Tablet (ISORDIL) 10 milliGRAM(s) Oral three times a day  metFORMIN 1000 milliGRAM(s) Oral every 12 hours  pantoprazole    Tablet 40 milliGRAM(s) Oral before breakfast  polyethylene glycol 3350 17 Gram(s) Oral every 24 hours  predniSONE   Tablet 50 milliGRAM(s) Oral daily  senna 1 Tablet(s) Oral every 12 hours  sodium chloride 0.9% lock flush 3 milliLiter(s) IV Push every 8 hours  tamsulosin Oral Tab/Cap - Peds 0.4 milliGRAM(s) Oral at bedtime  tiotropium 18 MICROgram(s) Capsule 1 Capsule(s) Inhalation daily  zinc sulfate 220 milliGRAM(s) Oral daily    MEDICATIONS  (PRN):  acetaminophen   Tablet .. 650 milliGRAM(s) Oral every 6 hours PRN Mild Pain (1 - 3)  dextrose 40% Gel 15 Gram(s) Oral once PRN Blood Glucose LESS THAN 70 milliGRAM(s)/deciliter  glucagon  Injectable 1 milliGRAM(s) IntraMuscular once PRN Glucose LESS THAN 70 milligrams/deciliter    HEPARIN:  [x] YES [] NO  Dose: 5000 UNITS Q8H  SCD's: YES b/l  GI Prophylaxis: Protonix [x], Pepcid [], None [], (Contra-indication:.....)      Allergies:  No Known Allergies    Assessment/Plan:  68y Female status-post CABG  - Case and plan discussed with CTU Intensivist and CT Surgeon - Dr. Urbano/Flaco/Shawn   - Continue CTU supportive care    - Continue DVT/GI prophylaxis  - Incentive Spirometry 10 times an hour  - Continue to advance physical activity as tolerated and continue PT/OT as directed  1. SURGEON(s): CHINA Escobar  SUBJECTIVE ASSESSMENT:  Patient complains of right LE pain and tenderness; pt complains of fatigue  Vital Signs Last 24 Hrs  T(F): 98.2 (07 Nov 2019 08:00), Max: 98.2 (07 Nov 2019 08:00)  HR: 79 (07 Nov 2019 08:00) (78 - 90)  BP: 104/65 (07 Nov 2019 08:00) (85/47 - 144/63)  BP(mean): 63 (07 Nov 2019 08:00) (63 - 90)  RR: 25 (07 Nov 2019 08:00) (15 - 30)  SpO2: 100% (07 Nov 2019 08:00) (98% - 100%) [facility]      I&O's Detail    06 Nov 2019 07:01  -  07 Nov 2019 07:00  --------------------------------------------------------  IN:    Albumin 5%  - 500 mL: 500 mL    Glucerna 1.5: 960 mL    IV PiggyBack: 400 mL    Oral Fluid: 1140 mL  Total IN: 3000 mL    OUT:    Chest Tube: 2000 mL    Indwelling Catheter - Urethral: 545 mL  Total OUT: 2545 mL        Net:   I&O's Detail    05 Nov 2019 07:01  -  06 Nov 2019 07:00  --------------------------------------------------------  Total NET: 825 mL      06 Nov 2019 07:01  -  07 Nov 2019 07:00  --------------------------------------------------------  Total NET: 455 mL        CAPILLARY BLOOD GLUCOSE  165 (06 Nov 2019 16:33)  104 (06 Nov 2019 12:00)      POCT Blood Glucose.: 276 mg/dL (07 Nov 2019 06:36)  POCT Blood Glucose.: 234 mg/dL (07 Nov 2019 05:06)  POCT Blood Glucose.: 233 mg/dL (07 Nov 2019 00:25)  POCT Blood Glucose.: 270 mg/dL (06 Nov 2019 22:53)  POCT Blood Glucose.: 165 mg/dL (06 Nov 2019 16:33)  POCT Blood Glucose.: 104 mg/dL (06 Nov 2019 12:02)    Physical Exam:  General: NAD; A&Ox3  Cardiac: S1/S2, RRR, no murmur, no rubs  Lungs: unlabored respirations, CTA b/l, no wheeze, no rales, no crackles  Abdomen: Soft/NT/ND; positive bowel sounds x 4  Sternum: Intact, no click, incision healing well with no drainage  Incisions: Incisions clean/dry/intact  Extremities: No edema b/l lower extremities; good capillary refill; no cyanosis; palpable 1+ pedal pulses b/l      LABS:                        8.4<L>  4.46<L> )-----------( 174      ( 07 Nov 2019 02:50 )             27.2<L>                        8.0<L>  5.37  )-----------( 166      ( 06 Nov 2019 02:55 )             26.1<L>    11-07    137  |  99  |  83<HH>  ----------------------------<  274<H>  5.7<H>   |  24  |  1.7<H>  11-07    136  |  99  |  82<HH>  ----------------------------<  241<H>  5.9<H>   |  21  |  1.7<H>    Ca    9.2      07 Nov 2019 06:35  Mg     1.8     11-07    TPro  5.4<L> [6.0 - 8.0]  /  Alb  3.3<L> [3.5 - 5.2]  /  TBili  <0.2 [0.2 - 1.2]  /  DBili  x   /  AST  11 [0 - 41]  /  ALT  6 [0 - 41]  /  AlkPhos  90 [30 - 115]  11-07    MEDICATIONS  (STANDING):  acetaZOLAMIDE  IVPB 500 milliGRAM(s) IV Intermittent daily  albuterol/ipratropium for Nebulization 3 milliLiter(s) Nebulizer every 6 hours  ascorbic acid 500 milliGRAM(s) Oral daily  aspirin  chewable 81 milliGRAM(s) Oral daily  atorvastatin 40 milliGRAM(s) Oral at bedtime  carvedilol 6.25 milliGRAM(s) Oral every 12 hours  cefepime   IVPB 1000 milliGRAM(s) IV Intermittent every 12 hours  dextrose 5%. 1000 milliLiter(s) (50 mL/Hr) IV Continuous <Continuous>  dextrose 50% Injectable 12.5 Gram(s) IV Push once  dextrose 50% Injectable 25 Gram(s) IV Push once  dextrose 50% Injectable 25 Gram(s) IV Push once  heparin  Injectable 5000 Unit(s) SubCutaneous every 8 hours  hydrALAZINE 10 milliGRAM(s) Oral every 8 hours  insulin glargine Injectable (LANTUS) 20 Unit(s) SubCutaneous every morning  insulin lispro (HumaLOG) corrective regimen sliding scale   SubCutaneous three times a day before meals  isosorbide   dinitrate Tablet (ISORDIL) 10 milliGRAM(s) Oral three times a day  metFORMIN 1000 milliGRAM(s) Oral every 12 hours  pantoprazole    Tablet 40 milliGRAM(s) Oral before breakfast  polyethylene glycol 3350 17 Gram(s) Oral every 24 hours  predniSONE   Tablet 50 milliGRAM(s) Oral daily  senna 1 Tablet(s) Oral every 12 hours  sodium chloride 0.9% lock flush 3 milliLiter(s) IV Push every 8 hours  tamsulosin Oral Tab/Cap - Peds 0.4 milliGRAM(s) Oral at bedtime  tiotropium 18 MICROgram(s) Capsule 1 Capsule(s) Inhalation daily  zinc sulfate 220 milliGRAM(s) Oral daily    MEDICATIONS  (PRN):  acetaminophen   Tablet .. 650 milliGRAM(s) Oral every 6 hours PRN Mild Pain (1 - 3)  dextrose 40% Gel 15 Gram(s) Oral once PRN Blood Glucose LESS THAN 70 milliGRAM(s)/deciliter  glucagon  Injectable 1 milliGRAM(s) IntraMuscular once PRN Glucose LESS THAN 70 milligrams/deciliter    HEPARIN:  [x] YES [] NO  Dose: 5000 UNITS Q8H  SCD's: YES b/l  GI Prophylaxis: Protonix [x], Pepcid [], None [], (Contra-indication:.....)      Allergies:  No Known Allergies    Assessment/Plan:  68y Female status-post CABG  - Case and plan discussed with CTU Intensivist and CT Surgeon - Dr. Urbano/Flaco/Shawn   - Continue CTU supportive care    - Continue DVT/GI prophylaxis  - Incentive Spirometry 10 times an hour  - Continue to advance physical activity as tolerated and continue PT/OT as directed  1. Hold Bumex; repeat BMP @ 14:00  2. F/U Sputum and Pleural Fluid Cultures

## 2019-11-08 LAB
ALBUMIN SERPL ELPH-MCNC: 3.2 G/DL — LOW (ref 3.5–5.2)
ALP SERPL-CCNC: 82 U/L — SIGNIFICANT CHANGE UP (ref 30–115)
ALT FLD-CCNC: 7 U/L — SIGNIFICANT CHANGE UP (ref 0–41)
ANION GAP SERPL CALC-SCNC: 16 MMOL/L — HIGH (ref 7–14)
AST SERPL-CCNC: 10 U/L — SIGNIFICANT CHANGE UP (ref 0–41)
BILIRUB SERPL-MCNC: <0.2 MG/DL — SIGNIFICANT CHANGE UP (ref 0.2–1.2)
BLD GP AB SCN SERPL QL: SIGNIFICANT CHANGE UP
BUN SERPL-MCNC: 93 MG/DL — CRITICAL HIGH (ref 10–20)
CALCIUM SERPL-MCNC: 9.3 MG/DL — SIGNIFICANT CHANGE UP (ref 8.5–10.1)
CHLORIDE SERPL-SCNC: 99 MMOL/L — SIGNIFICANT CHANGE UP (ref 98–110)
CO2 SERPL-SCNC: 22 MMOL/L — SIGNIFICANT CHANGE UP (ref 17–32)
CREAT SERPL-MCNC: 2 MG/DL — HIGH (ref 0.7–1.5)
CULTURE RESULTS: SIGNIFICANT CHANGE UP
GLUCOSE BLDC GLUCOMTR-MCNC: 183 MG/DL — HIGH (ref 70–99)
GLUCOSE BLDC GLUCOMTR-MCNC: 213 MG/DL — HIGH (ref 70–99)
GLUCOSE BLDC GLUCOMTR-MCNC: 230 MG/DL — HIGH (ref 70–99)
GLUCOSE BLDC GLUCOMTR-MCNC: 254 MG/DL — HIGH (ref 70–99)
GLUCOSE BLDC GLUCOMTR-MCNC: 268 MG/DL — HIGH (ref 70–99)
GLUCOSE SERPL-MCNC: 208 MG/DL — HIGH (ref 70–99)
HCT VFR BLD CALC: 25.2 % — LOW (ref 37–47)
HGB BLD-MCNC: 7.8 G/DL — LOW (ref 12–16)
MAGNESIUM SERPL-MCNC: 2.1 MG/DL — SIGNIFICANT CHANGE UP (ref 1.8–2.4)
MCHC RBC-ENTMCNC: 27.9 PG — SIGNIFICANT CHANGE UP (ref 27–31)
MCHC RBC-ENTMCNC: 31 G/DL — LOW (ref 32–37)
MCV RBC AUTO: 90 FL — SIGNIFICANT CHANGE UP (ref 81–99)
NON-GYNECOLOGICAL CYTOLOGY STUDY: SIGNIFICANT CHANGE UP
NRBC # BLD: 0 /100 WBCS — SIGNIFICANT CHANGE UP (ref 0–0)
PLATELET # BLD AUTO: 201 K/UL — SIGNIFICANT CHANGE UP (ref 130–400)
POTASSIUM SERPL-MCNC: 5.4 MMOL/L — HIGH (ref 3.5–5)
POTASSIUM SERPL-SCNC: 5.4 MMOL/L — HIGH (ref 3.5–5)
PROT SERPL-MCNC: 5.2 G/DL — LOW (ref 6–8)
RBC # BLD: 2.8 M/UL — LOW (ref 4.2–5.4)
RBC # FLD: 16.3 % — HIGH (ref 11.5–14.5)
SODIUM SERPL-SCNC: 137 MMOL/L — SIGNIFICANT CHANGE UP (ref 135–146)
SPECIMEN SOURCE: SIGNIFICANT CHANGE UP
WBC # BLD: 6.48 K/UL — SIGNIFICANT CHANGE UP (ref 4.8–10.8)
WBC # FLD AUTO: 6.48 K/UL — SIGNIFICANT CHANGE UP (ref 4.8–10.8)

## 2019-11-08 PROCEDURE — 99233 SBSQ HOSP IP/OBS HIGH 50: CPT

## 2019-11-08 PROCEDURE — 99291 CRITICAL CARE FIRST HOUR: CPT

## 2019-11-08 PROCEDURE — 71045 X-RAY EXAM CHEST 1 VIEW: CPT | Mod: 26

## 2019-11-08 PROCEDURE — 99024 POSTOP FOLLOW-UP VISIT: CPT

## 2019-11-08 RX ORDER — MILRINONE LACTATE 1 MG/ML
0.25 INJECTION, SOLUTION INTRAVENOUS
Qty: 20 | Refills: 0 | Status: DISCONTINUED | OUTPATIENT
Start: 2019-11-08 | End: 2019-11-11

## 2019-11-08 RX ORDER — OXYCODONE HYDROCHLORIDE 5 MG/1
5 TABLET ORAL ONCE
Refills: 0 | Status: DISCONTINUED | OUTPATIENT
Start: 2019-11-08 | End: 2019-11-08

## 2019-11-08 RX ADMIN — ISOSORBIDE DINITRATE 10 MILLIGRAM(S): 5 TABLET ORAL at 21:39

## 2019-11-08 RX ADMIN — ZINC SULFATE TAB 220 MG (50 MG ZINC EQUIVALENT) 220 MILLIGRAM(S): 220 (50 ZN) TAB at 11:36

## 2019-11-08 RX ADMIN — Medication 10 MILLIGRAM(S): at 21:39

## 2019-11-08 RX ADMIN — TAMSULOSIN HYDROCHLORIDE 0.4 MILLIGRAM(S): 0.4 CAPSULE ORAL at 21:39

## 2019-11-08 RX ADMIN — SODIUM CHLORIDE 3 MILLILITER(S): 9 INJECTION INTRAMUSCULAR; INTRAVENOUS; SUBCUTANEOUS at 07:12

## 2019-11-08 RX ADMIN — CARVEDILOL PHOSPHATE 6.25 MILLIGRAM(S): 80 CAPSULE, EXTENDED RELEASE ORAL at 05:34

## 2019-11-08 RX ADMIN — Medication 10 MILLIGRAM(S): at 05:34

## 2019-11-08 RX ADMIN — Medication 3 MILLILITER(S): at 20:50

## 2019-11-08 RX ADMIN — OXYCODONE HYDROCHLORIDE 5 MILLIGRAM(S): 5 TABLET ORAL at 21:39

## 2019-11-08 RX ADMIN — HEPARIN SODIUM 5000 UNIT(S): 5000 INJECTION INTRAVENOUS; SUBCUTANEOUS at 05:36

## 2019-11-08 RX ADMIN — ISOSORBIDE DINITRATE 10 MILLIGRAM(S): 5 TABLET ORAL at 13:09

## 2019-11-08 RX ADMIN — SENNA PLUS 1 TABLET(S): 8.6 TABLET ORAL at 17:20

## 2019-11-08 RX ADMIN — CARVEDILOL PHOSPHATE 6.25 MILLIGRAM(S): 80 CAPSULE, EXTENDED RELEASE ORAL at 17:20

## 2019-11-08 RX ADMIN — CEFEPIME 100 MILLIGRAM(S): 1 INJECTION, POWDER, FOR SOLUTION INTRAMUSCULAR; INTRAVENOUS at 05:35

## 2019-11-08 RX ADMIN — Medication 4: at 07:11

## 2019-11-08 RX ADMIN — Medication 650 MILLIGRAM(S): at 17:21

## 2019-11-08 RX ADMIN — HEPARIN SODIUM 5000 UNIT(S): 5000 INJECTION INTRAVENOUS; SUBCUTANEOUS at 21:40

## 2019-11-08 RX ADMIN — Medication 81 MILLIGRAM(S): at 11:36

## 2019-11-08 RX ADMIN — Medication 6: at 17:21

## 2019-11-08 RX ADMIN — HEPARIN SODIUM 5000 UNIT(S): 5000 INJECTION INTRAVENOUS; SUBCUTANEOUS at 13:09

## 2019-11-08 RX ADMIN — Medication 6: at 11:36

## 2019-11-08 RX ADMIN — Medication 650 MILLIGRAM(S): at 12:00

## 2019-11-08 RX ADMIN — Medication 3 MILLILITER(S): at 14:41

## 2019-11-08 RX ADMIN — METFORMIN HYDROCHLORIDE 1000 MILLIGRAM(S): 850 TABLET ORAL at 05:34

## 2019-11-08 RX ADMIN — OXYCODONE HYDROCHLORIDE 5 MILLIGRAM(S): 5 TABLET ORAL at 22:09

## 2019-11-08 RX ADMIN — MILRINONE LACTATE 4.18 MICROGRAM(S)/KG/MIN: 1 INJECTION, SOLUTION INTRAVENOUS at 10:07

## 2019-11-08 RX ADMIN — Medication 650 MILLIGRAM(S): at 23:25

## 2019-11-08 RX ADMIN — SODIUM CHLORIDE 3 MILLILITER(S): 9 INJECTION INTRAMUSCULAR; INTRAVENOUS; SUBCUTANEOUS at 22:05

## 2019-11-08 RX ADMIN — Medication 650 MILLIGRAM(S): at 11:38

## 2019-11-08 RX ADMIN — Medication 3 MILLILITER(S): at 08:21

## 2019-11-08 RX ADMIN — Medication 10 MILLIGRAM(S): at 13:09

## 2019-11-08 RX ADMIN — ATORVASTATIN CALCIUM 40 MILLIGRAM(S): 80 TABLET, FILM COATED ORAL at 21:39

## 2019-11-08 RX ADMIN — METFORMIN HYDROCHLORIDE 1000 MILLIGRAM(S): 850 TABLET ORAL at 17:20

## 2019-11-08 RX ADMIN — CEFEPIME 100 MILLIGRAM(S): 1 INJECTION, POWDER, FOR SOLUTION INTRAMUSCULAR; INTRAVENOUS at 17:20

## 2019-11-08 RX ADMIN — Medication 500 MILLIGRAM(S): at 11:36

## 2019-11-08 RX ADMIN — INSULIN GLARGINE 20 UNIT(S): 100 INJECTION, SOLUTION SUBCUTANEOUS at 07:06

## 2019-11-08 RX ADMIN — Medication 650 MILLIGRAM(S): at 17:50

## 2019-11-08 RX ADMIN — ACETAZOLAMIDE 110 MILLIGRAM(S): 250 TABLET ORAL at 13:08

## 2019-11-08 RX ADMIN — Medication 50 MILLIGRAM(S): at 05:34

## 2019-11-08 RX ADMIN — ISOSORBIDE DINITRATE 10 MILLIGRAM(S): 5 TABLET ORAL at 05:34

## 2019-11-08 RX ADMIN — SODIUM CHLORIDE 3 MILLILITER(S): 9 INJECTION INTRAMUSCULAR; INTRAVENOUS; SUBCUTANEOUS at 13:09

## 2019-11-08 RX ADMIN — PANTOPRAZOLE SODIUM 40 MILLIGRAM(S): 20 TABLET, DELAYED RELEASE ORAL at 07:07

## 2019-11-08 NOTE — PROGRESS NOTE ADULT - ASSESSMENT
ASSESSMENT/PLAN  - 67 yo female admitted with increasing SOB and RLE pain  - + cellulitis and RLL pneumonia  - s/p thoracentesis for pleural effusion  - DM - improved glycemic control  - + severe protein calorie malnutrition  - + sacral/ perineal skin breakdown  continue with current nutrition and p.o supplement   check bmp/phos/ mg and correct lytes

## 2019-11-08 NOTE — PROGRESS NOTE ADULT - SUBJECTIVE AND OBJECTIVE BOX
Patient is a 68y old  Female who presents with a chief complaint of Shortness of breath, leg pain (08 Nov 2019 17:37)  pt seen and evaluated on p.o diet and has no complains    ICU Vital Signs Last 24 Hrs  T(C): 36.1 (08 Nov 2019 16:00), Max: 36.7 (08 Nov 2019 03:00)  T(F): 97 (08 Nov 2019 16:00), Max: 98.1 (08 Nov 2019 03:00)  HR: 97 (08 Nov 2019 17:00) (88 - 101)  BP: 98/55 (08 Nov 2019 17:00) (90/55 - 155/67)  BP(mean): 74 (08 Nov 2019 17:00) (69 - 97)  RR: 22 (08 Nov 2019 17:00) (18 - 31)  SpO2: 96% (08 Nov 2019 17:00) (93% - 100%)      Drug Dosing Weight  Height (cm): 160.02 (08 Nov 2019 17:37)  Weight (kg): 55.8 (31 Oct 2019 19:31)  BMI (kg/m2): 21.8 (08 Nov 2019 17:37)  BSA (m2): 1.57 (08 Nov 2019 17:37)    I&O's Detail    07 Nov 2019 07:01  -  08 Nov 2019 07:00  --------------------------------------------------------  IN:    IV PiggyBack: 150 mL    Oral Fluid: 860 mL  Total IN: 1010 mL    OUT:    Chest Tube: 375 mL    Indwelling Catheter - Urethral: 985 mL  Total OUT: 1360 mL    Total NET: -350 mL      08 Nov 2019 07:01  -  08 Nov 2019 18:08  --------------------------------------------------------  IN:    IV PiggyBack: 100 mL    milrinone  Infusion: 41.9 mL    Oral Fluid: 680 mL  Total IN: 821.9 mL    OUT:    Chest Tube: 230 mL    Indwelling Catheter - Urethral: 480 mL  Total OUT: 710 mL    Total NET: 111.9 mL    PHYSICAL EXAM:  Constitutional: a+oX3 NAD  Gastrointestinal: SOFT N/T N/D  MEDICATIONS  (STANDING):  albuterol/ipratropium for Nebulization 3 milliLiter(s) Nebulizer every 6 hours  ascorbic acid 500 milliGRAM(s) Oral daily  aspirin  chewable 81 milliGRAM(s) Oral daily  atorvastatin 40 milliGRAM(s) Oral at bedtime  carvedilol 6.25 milliGRAM(s) Oral every 12 hours  cefepime   IVPB 1000 milliGRAM(s) IV Intermittent every 12 hours  dextrose 5%. 1000 milliLiter(s) (50 mL/Hr) IV Continuous <Continuous>  dextrose 50% Injectable 12.5 Gram(s) IV Push once  dextrose 50% Injectable 25 Gram(s) IV Push once  dextrose 50% Injectable 25 Gram(s) IV Push once  heparin  Injectable 5000 Unit(s) SubCutaneous every 8 hours  hydrALAZINE 10 milliGRAM(s) Oral every 8 hours  insulin glargine Injectable (LANTUS) 20 Unit(s) SubCutaneous every morning  insulin lispro (HumaLOG) corrective regimen sliding scale   SubCutaneous three times a day before meals  isosorbide   dinitrate Tablet (ISORDIL) 10 milliGRAM(s) Oral three times a day  metFORMIN 1000 milliGRAM(s) Oral every 12 hours  milrinone Infusion 0.25 MICROgram(s)/kG/Min (4.185 mL/Hr) IV Continuous <Continuous>  oxyCODONE    IR 5 milliGRAM(s) Oral once  pantoprazole    Tablet 40 milliGRAM(s) Oral before breakfast  polyethylene glycol 3350 17 Gram(s) Oral every 24 hours  senna 1 Tablet(s) Oral every 12 hours  sodium chloride 0.9% lock flush 3 milliLiter(s) IV Push every 8 hours  tamsulosin Oral Tab/Cap - Peds 0.4 milliGRAM(s) Oral at bedtime  tiotropium 18 MICROgram(s) Capsule 1 Capsule(s) Inhalation daily  zinc sulfate 220 milliGRAM(s) Oral daily      Diet, Consistent Carbohydrate/No Snacks:   Chris(7 Gm Arginine/7 Gm Glut/1.2 Gm HMB     Qty per Day:  2  Beneprotein ([facility] Only)     Qty per Day:  2  Supplement Feeding Modality:  Oral  Glucerna Shake Cans or Servings Per Day:  1       Frequency:  Two Times a day (11-07-19 @ 10:21)      LABS  11-08    137  |  99  |  93<HH>  ----------------------------<  208<H>  5.4<H>   |  22  |  2.0<H>    Ca    9.3      08 Nov 2019 03:00  Mg     2.1     11-08    TPro  5.2<L>  /  Alb  3.2<L>  /  TBili  <0.2  /  DBili  x   /  AST  10  /  ALT  7   /  AlkPhos  82  11-08                          7.8    6.48  )-----------( 201      ( 08 Nov 2019 03:00 )             25.2     CAPILLARY BLOOD GLUCOSE  POCT Blood Glucose.: 268 mg/dL (08 Nov 2019 16:50)  POCT Blood Glucose.: 254 mg/dL (08 Nov 2019 10:59)  POCT Blood Glucose.: 213 mg/dL (08 Nov 2019 07:05)  POCT Blood Glucose.: 183 mg/dL (08 Nov 2019 05:59)   RADIOLOGY STUDIES  < from: Xray Chest 1 View- PORTABLE-Routine (11.08.19 @ 04:03) >  Impression:    Cardiomegaly with CHF.  Without difference.

## 2019-11-08 NOTE — PROGRESS NOTE ADULT - SUBJECTIVE AND OBJECTIVE BOX
OPERATIVE PROCEDURE(s):                POD #                       68yFemale  SURGEON(s): CHINA Escobar  SUBJECTIVE ASSESSMENT:   Vital Signs Last 24 Hrs  T(F): 98.1 (08 Nov 2019 03:00), Max: 98.2 (07 Nov 2019 12:00)  HR: 88 (08 Nov 2019 05:00) (75 - 100)  BP: 107/55 (08 Nov 2019 05:00) (93/67 - 155/67)  BP(mean): 79 (08 Nov 2019 05:00) (67 - 97)  ABP: --  ABP(mean): --  RR: 18 (07 Nov 2019 19:00) (16 - 23)  SpO2: 100% (08 Nov 2019 05:00) (95% - 100%)  CVP(mm Hg): --  CVP(cm H2O): --  CO: --  CI: --  PA: --  SVR: --    I&O's Detail    07 Nov 2019 07:01  -  08 Nov 2019 07:00  --------------------------------------------------------  IN:    IV PiggyBack: 150 mL    Oral Fluid: 860 mL  Total IN: 1010 mL    OUT:    Chest Tube: 375 mL    Indwelling Catheter - Urethral: 985 mL  Total OUT: 1360 mL        Net:   I&O's Detail    06 Nov 2019 07:01  -  07 Nov 2019 07:00  --------------------------------------------------------  Total NET: 455 mL      07 Nov 2019 07:01  -  08 Nov 2019 07:00  --------------------------------------------------------  Total NET: -350 mL        CAPILLARY BLOOD GLUCOSE      POCT Blood Glucose.: 213 mg/dL (08 Nov 2019 07:05)  POCT Blood Glucose.: 183 mg/dL (08 Nov 2019 05:59)  POCT Blood Glucose.: 295 mg/dL (07 Nov 2019 22:35)  POCT Blood Glucose.: 230 mg/dL (07 Nov 2019 16:12)  POCT Blood Glucose.: 229 mg/dL (07 Nov 2019 10:52)    Physical Exam:  General: NAD; A&Ox3/Patient is intubated and sedated  Cardiac: S1/S2, RRR, no murmur, no rubs  Lungs: unlabored respirations, CTA b/l, no wheeze, no rales, no crackles  Abdomen: Soft/NT/ND; positive bowel sounds x 4  Sternum: Intact, no click, incision healing well with no drainage  Incisions: Incisions clean/dry/intact  Extremities: No edema b/l lower extremities; good capillary refill; no cyanosis; palpable 1+ pedal pulses b/l    Central Venous Catheter: Yes[]  No[] , If Yes indication:           Day #  [place] Catheter: Yes  [] , No  [] , If yes indication:                      Day #  NGT: Yes [] No [] ,    If Yes Placement:                                     Day #  EPICARDIAL WIRES:  [] YES [] NO                                              Day #  BOWEL MOVEMENT:  [] YES [] NO, If No, Timing since last BM:      Day #  CHEST TUBE(Left/Right):  [] YES [] NO, If yes -  AIR LEAKS:  [] YES [] NO        LABS:                        7.8<L>  6.48  )-----------( 201      ( 08 Nov 2019 03:00 )             25.2<L>                        8.4<L>  4.46<L> )-----------( 174      ( 07 Nov 2019 02:50 )             27.2<L>    11-08    137  |  99  |  93<HH>  ----------------------------<  208<H>  5.4<H>   |  22  |  2.0<H>  11-07    136  |  98  |  93<HH>  ----------------------------<  229<H>  5.7<H>   |  25  |  1.9<H>    Ca    9.3      08 Nov 2019 03:00  Mg     2.1     11-08    TPro  5.2<L> [6.0 - 8.0]  /  Alb  3.2<L> [3.5 - 5.2]  /  TBili  <0.2 [0.2 - 1.2]  /  DBili  x   /  AST  10 [0 - 41]  /  ALT  7 [0 - 41]  /  AlkPhos  82 [30 - 115]  11-08          RADIOLOGY & ADDITIONAL TESTS:  CXR:  EKG:  MEDICATIONS  (STANDING):  acetaZOLAMIDE  IVPB 500 milliGRAM(s) IV Intermittent daily  albuterol/ipratropium for Nebulization 3 milliLiter(s) Nebulizer every 6 hours  ascorbic acid 500 milliGRAM(s) Oral daily  aspirin  chewable 81 milliGRAM(s) Oral daily  atorvastatin 40 milliGRAM(s) Oral at bedtime  carvedilol 6.25 milliGRAM(s) Oral every 12 hours  cefepime   IVPB 1000 milliGRAM(s) IV Intermittent every 12 hours  dextrose 5%. 1000 milliLiter(s) (50 mL/Hr) IV Continuous <Continuous>  dextrose 50% Injectable 12.5 Gram(s) IV Push once  dextrose 50% Injectable 25 Gram(s) IV Push once  dextrose 50% Injectable 25 Gram(s) IV Push once  heparin  Injectable 5000 Unit(s) SubCutaneous every 8 hours  hydrALAZINE 10 milliGRAM(s) Oral every 8 hours  insulin glargine Injectable (LANTUS) 20 Unit(s) SubCutaneous every morning  insulin lispro (HumaLOG) corrective regimen sliding scale   SubCutaneous three times a day before meals  isosorbide   dinitrate Tablet (ISORDIL) 10 milliGRAM(s) Oral three times a day  metFORMIN 1000 milliGRAM(s) Oral every 12 hours  pantoprazole    Tablet 40 milliGRAM(s) Oral before breakfast  polyethylene glycol 3350 17 Gram(s) Oral every 24 hours  predniSONE   Tablet 50 milliGRAM(s) Oral daily  senna 1 Tablet(s) Oral every 12 hours  sodium chloride 0.9% lock flush 3 milliLiter(s) IV Push every 8 hours  tamsulosin Oral Tab/Cap - Peds 0.4 milliGRAM(s) Oral at bedtime  tiotropium 18 MICROgram(s) Capsule 1 Capsule(s) Inhalation daily  zinc sulfate 220 milliGRAM(s) Oral daily    MEDICATIONS  (PRN):  acetaminophen   Tablet .. 650 milliGRAM(s) Oral every 6 hours PRN Mild Pain (1 - 3)  dextrose 40% Gel 15 Gram(s) Oral once PRN Blood Glucose LESS THAN 70 milliGRAM(s)/deciliter  glucagon  Injectable 1 milliGRAM(s) IntraMuscular once PRN Glucose LESS THAN 70 milligrams/deciliter    HEPARIN:  [] YES [] NO  Dose: XX UNITS/HR UNITS Q8H  LOVENOX:[] YES [] NO  Dose: XX mg Q24H  COUMADIN: []  YES [] NO  Dose: XX mg  Q24H  SCD's: YES b/l  GI Prophylaxis: Protonix [], Pepcid [], None [], (Contra-indication:.....)    Post-Op Beta-Blockers: Yes [], No[], If No, then contraindication:  Post-Op Aspirin: Yes [],  No [], If No, then contraindication:  Post-Op Statin: Yes [], No[], If No, then contraindication:  Allergies    No Known Allergies    Intolerances      Ambulation/Activity Status:    Assessment/Plan:  68y Female status-post .....  - Case and plan discussed with CTU Intensivist and CT Surgeon - Dr. Urbano/Flaco/Shawn   - Continue CTU supportive care    - Continue DVT/GI prophylaxis  - Incentive Spirometry 10 times an hour  - Continue to advance physical activity as tolerated and continue PT/OT as directed  1. CAD: Continue ASA, statin, BB  2. HTN:   3. A. Fib:   4. COPD/Hypoxia:   5. DM/Glucose Control:     Social Service Disposition: OPERATIVE PROCEDURE(s):     cabg readmit with right pleural effusion, right leg pain most likely secondary to neuropathy and poss pna           POD #                       68yFemale  SURGEON(s): CHINA Escobar  SUBJECTIVE ASSESSMENT: pt seen and examined. pt feels like she is improving slowly  Vital Signs Last 24 Hrs  T(F): 98.1 (08 Nov 2019 03:00), Max: 98.2 (07 Nov 2019 12:00)  HR: 88 (08 Nov 2019 05:00) (75 - 100)  BP: 107/55 (08 Nov 2019 05:00) (93/67 - 155/67)  BP(mean): 79 (08 Nov 2019 05:00) (67 - 97)  RR: 18 (07 Nov 2019 19:00) (16 - 23)  SpO2: 100% (08 Nov 2019 05:00) (95% - 100%)    I&O's Detail    07 Nov 2019 07:01  -  08 Nov 2019 07:00  --------------------------------------------------------  IN:    IV PiggyBack: 150 mL    Oral Fluid: 860 mL  Total IN: 1010 mL    OUT:    Chest Tube: 375 mL    Indwelling Catheter - Urethral: 985 mL  Total OUT: 1360 mL        Net:   I&O's Detail    06 Nov 2019 07:01  -  07 Nov 2019 07:00  --------------------------------------------------------  Total NET: 455 mL      07 Nov 2019 07:01  -  08 Nov 2019 07:00  --------------------------------------------------------  Total NET: -350 mL        CAPILLARY BLOOD GLUCOSE      POCT Blood Glucose.: 213 mg/dL (08 Nov 2019 07:05)  POCT Blood Glucose.: 183 mg/dL (08 Nov 2019 05:59)  POCT Blood Glucose.: 295 mg/dL (07 Nov 2019 22:35)  POCT Blood Glucose.: 230 mg/dL (07 Nov 2019 16:12)  POCT Blood Glucose.: 229 mg/dL (07 Nov 2019 10:52)    Physical Exam:  General: NAD; A&Ox3  Cardiac: S1/S2, RRR, no murmur, no rubs  Lungs: decreased bs at right bases, crackles bilateral  Abdomen: Soft/NT/ND; positive bowel sounds x 4  Sternum: Intact, no click, incision healing well with no drainage  Incisions: Incisions clean/dry/intact  Extremities: 2+ edema b/l lower extremities; good capillary refill; no cyanosis; palpable 1+ pedal pulses b/l, pain to palpation on right calf, no erythema, cool to touch    Central Venous Catheter: Yes[]  No[x] , If Yes indication:           Day #  [place] Catheter: Yes  [x] , No  [] , If yes indication:      prolapse bladder                Day # chronic mon[place]jo from home  NGT: Yes [] No [x] ,    If Yes Placement:                                     Day #  EPICARDIAL WIRES:  [] YES [x] NO                                              Day #  BOWEL MOVEMENT:  [] YES [x] NO, If No, Timing since last BM:      Day #  CHEST TUBE(Right):  [x] YES [] NO, If yes -  AIR LEAKS:  [] YES [] NO        LABS:                        7.8<L>  6.48  )-----------( 201      ( 08 Nov 2019 03:00 )             25.2<L>                        8.4<L>  4.46<L> )-----------( 174      ( 07 Nov 2019 02:50 )             27.2<L>    11-08    137  |  99  |  93<HH>  ----------------------------<  208<H>  5.4<H>   |  22  |  2.0<H>  11-07    136  |  98  |  93<HH>  ----------------------------<  229<H>  5.7<H>   |  25  |  1.9<H>    Ca    9.3      08 Nov 2019 03:00  Mg     2.1     11-08    TPro  5.2<L> [6.0 - 8.0]  /  Alb  3.2<L> [3.5 - 5.2]  /  TBili  <0.2 [0.2 - 1.2]  /  DBili  x   /  AST  10 [0 - 41]  /  ALT  7 [0 - 41]  /  AlkPhos  82 [30 - 115]  11-08          RADIOLOGY & ADDITIONAL TESTS:  CXR: 	< from: Xray Chest 1 View- PORTABLE-Routine (11.08.19 @ 04:03) >  Impression:      Cardiomegaly with CHF.    Without difference.    < end of copied text >    EKG:  MEDICATIONS  (STANDING):  acetaZOLAMIDE  IVPB 500 milliGRAM(s) IV Intermittent daily  albuterol/ipratropium for Nebulization 3 milliLiter(s) Nebulizer every 6 hours  ascorbic acid 500 milliGRAM(s) Oral daily  aspirin  chewable 81 milliGRAM(s) Oral daily  atorvastatin 40 milliGRAM(s) Oral at bedtime  carvedilol 6.25 milliGRAM(s) Oral every 12 hours  cefepime   IVPB 1000 milliGRAM(s) IV Intermittent every 12 hours  dextrose 5%. 1000 milliLiter(s) (50 mL/Hr) IV Continuous <Continuous>  dextrose 50% Injectable 12.5 Gram(s) IV Push once  dextrose 50% Injectable 25 Gram(s) IV Push once  dextrose 50% Injectable 25 Gram(s) IV Push once  heparin  Injectable 5000 Unit(s) SubCutaneous every 8 hours  hydrALAZINE 10 milliGRAM(s) Oral every 8 hours  insulin glargine Injectable (LANTUS) 20 Unit(s) SubCutaneous every morning  insulin lispro (HumaLOG) corrective regimen sliding scale   SubCutaneous three times a day before meals  isosorbide   dinitrate Tablet (ISORDIL) 10 milliGRAM(s) Oral three times a day  metFORMIN 1000 milliGRAM(s) Oral every 12 hours  pantoprazole    Tablet 40 milliGRAM(s) Oral before breakfast  polyethylene glycol 3350 17 Gram(s) Oral every 24 hours  predniSONE   Tablet 50 milliGRAM(s) Oral daily  senna 1 Tablet(s) Oral every 12 hours  sodium chloride 0.9% lock flush 3 milliLiter(s) IV Push every 8 hours  tamsulosin Oral Tab/Cap - Peds 0.4 milliGRAM(s) Oral at bedtime  tiotropium 18 MICROgram(s) Capsule 1 Capsule(s) Inhalation daily  zinc sulfate 220 milliGRAM(s) Oral daily    MEDICATIONS  (PRN):  acetaminophen   Tablet .. 650 milliGRAM(s) Oral every 6 hours PRN Mild Pain (1 - 3)  dextrose 40% Gel 15 Gram(s) Oral once PRN Blood Glucose LESS THAN 70 milliGRAM(s)/deciliter  glucagon  Injectable 1 milliGRAM(s) IntraMuscular once PRN Glucose LESS THAN 70 milligrams/deciliter    HEPARIN:  [x] YES [] NO  Dose: 5000 UNITS Q8H  LOVENOX:[] YES [x] NO  Dose: XX mg Q24H  COUMADIN: []  YES [x] NO  Dose: XX mg  Q24H  SCD's: YES b/l  GI Prophylaxis: Protonix [x], Pepcid [], None [], (Contra-indication:.....)      Allergies    No Known Allergies    Intolerances      Ambulation/Activity Status: ambulate     Assessment/Plan:  68y Female status-post CABGx3 on 10/7/2019 readmit with right pleural effusion, poss pna, and right lower extremity leg pain most likely secondary to neuropathy/cellulitis  - Case and plan discussed with CTU Intensivist and CT Surgeon - Dr. Urbano/Flaco/Shawn   - Continue CTU supportive care    - Continue DVT/GI prophylaxis  - Incentive Spirometry 10 times an hour  - Continue to advance physical activity as tolerated and continue PT/OT as directed  1. CAD: Continue ASA, statin, BB  2. chronic systolic heart failure- cont coreg and isordil, no diuresis for cr of 2, start milrinone 0.25  3. A. Fib: d/c amio, rate control with coreg  4. COPD/Hypoxia: cont nebs, wean o2, right pig tail for pleural effusion; cont cefepime for pna as per ID  5. DM/Glucose Control: cont lantus 20, sliding scale    dispo: home OPERATIVE PROCEDURE(s):     cabg readmit with right pleural effusion, right leg pain most likely secondary to neuropathy and poss pna              68yFemale  SURGEON(s): CHINA Escobar  SUBJECTIVE ASSESSMENT: pt seen and examined. pt feels like she is improving slowly  Vital Signs Last 24 Hrs  T(F): 98.1 (08 Nov 2019 03:00), Max: 98.2 (07 Nov 2019 12:00)  HR: 88 (08 Nov 2019 05:00) (75 - 100)  BP: 107/55 (08 Nov 2019 05:00) (93/67 - 155/67)  BP(mean): 79 (08 Nov 2019 05:00) (67 - 97)  RR: 18 (07 Nov 2019 19:00) (16 - 23)  SpO2: 100% (08 Nov 2019 05:00) (95% - 100%)    I&O's Detail    07 Nov 2019 07:01  -  08 Nov 2019 07:00  --------------------------------------------------------  IN:    IV PiggyBack: 150 mL    Oral Fluid: 860 mL  Total IN: 1010 mL    OUT:    Chest Tube: 375 mL    Indwelling Catheter - Urethral: 985 mL  Total OUT: 1360 mL    Net:   I&O's Detail    06 Nov 2019 07:01  -  07 Nov 2019 07:00  --------------------------------------------------------  Total NET: 455 mL    07 Nov 2019 07:01  -  08 Nov 2019 07:00  --------------------------------------------------------  Total NET: -350 mL    CAPILLARY BLOOD GLUCOSE  POCT Blood Glucose.: 213 mg/dL (08 Nov 2019 07:05)  POCT Blood Glucose.: 183 mg/dL (08 Nov 2019 05:59)  POCT Blood Glucose.: 295 mg/dL (07 Nov 2019 22:35)  POCT Blood Glucose.: 230 mg/dL (07 Nov 2019 16:12)  POCT Blood Glucose.: 229 mg/dL (07 Nov 2019 10:52)    Physical Exam:  General: NAD; A&Ox3  Cardiac: S1/S2, RRR, no murmur, no rubs  Lungs: decreased bs at right bases, crackles bilateral  Abdomen: Soft/NT/ND; positive bowel sounds x 4  Sternum: Intact, no click, incision healing well with no drainage  Incisions: Incisions clean/dry/intact  Extremities: 2+ edema b/l lower extremities; good capillary refill; no cyanosis; palpable 1+ pedal pulses b/l, pain to palpation on right calf, no erythema, cool to touch    Central Venous Catheter: Yes[]  No[x] , If Yes indication:           Day #  [place] Catheter: Yes  [x] , No  [] , If yes indication:      prolapse bladder                Day # chronic [place] from home  NGT: Yes [] No [x] ,    If Yes Placement:                                     Day #  EPICARDIAL WIRES:  [] YES [x] NO                                              Day #  BOWEL MOVEMENT:  [] YES [x] NO, If No, Timing since last BM:      Day #  CHEST TUBE(Right):  [x] YES [] NO, If yes -  AIR LEAKS:  [] YES [] NO        LABS:                        7.8<L>  6.48  )-----------( 201      ( 08 Nov 2019 03:00 )             25.2<L>                        8.4<L>  4.46<L> )-----------( 174      ( 07 Nov 2019 02:50 )             27.2<L>    11-08    137  |  99  |  93<HH>  ----------------------------<  208<H>  5.4<H>   |  22  |  2.0<H>  11-07    136  |  98  |  93<HH>  ----------------------------<  229<H>  5.7<H>   |  25  |  1.9<H>    Ca    9.3      08 Nov 2019 03:00  Mg     2.1     11-08    TPro  5.2<L> [6.0 - 8.0]  /  Alb  3.2<L> [3.5 - 5.2]  /  TBili  <0.2 [0.2 - 1.2]  /  DBili  x   /  AST  10 [0 - 41]  /  ALT  7 [0 - 41]  /  AlkPhos  82 [30 - 115]  11-08    CXR: 	< from: Xray Chest 1 View- PORTABLE-Routine (11.08.19 @ 04:03) >  Impression:    Cardiomegaly with CHF.  Without difference.    EKG:  MEDICATIONS  (STANDING):  acetaZOLAMIDE  IVPB 500 milliGRAM(s) IV Intermittent daily  albuterol/ipratropium for Nebulization 3 milliLiter(s) Nebulizer every 6 hours  ascorbic acid 500 milliGRAM(s) Oral daily  aspirin  chewable 81 milliGRAM(s) Oral daily  atorvastatin 40 milliGRAM(s) Oral at bedtime  carvedilol 6.25 milliGRAM(s) Oral every 12 hours  cefepime   IVPB 1000 milliGRAM(s) IV Intermittent every 12 hours  heparin  Injectable 5000 Unit(s) SubCutaneous every 8 hours  hydrALAZINE 10 milliGRAM(s) Oral every 8 hours  insulin glargine Injectable (LANTUS) 20 Unit(s) SubCutaneous every morning  insulin lispro (HumaLOG) corrective regimen sliding scale   SubCutaneous three times a day before meals  isosorbide   dinitrate Tablet (ISORDIL) 10 milliGRAM(s) Oral three times a day  metFORMIN 1000 milliGRAM(s) Oral every 12 hours  pantoprazole    Tablet 40 milliGRAM(s) Oral before breakfast  polyethylene glycol 3350 17 Gram(s) Oral every 24 hours  predniSONE   Tablet 50 milliGRAM(s) Oral daily  senna 1 Tablet(s) Oral every 12 hours  tamsulosin Oral Tab/Cap - Peds 0.4 milliGRAM(s) Oral at bedtime  tiotropium 18 MICROgram(s) Capsule 1 Capsule(s) Inhalation daily  zinc sulfate 220 milliGRAM(s) Oral daily    MEDICATIONS  (PRN):  acetaminophen   Tablet .. 650 milliGRAM(s) Oral every 6 hours PRN Mild Pain (1 - 3)  dextrose 40% Gel 15 Gram(s) Oral once PRN Blood Glucose LESS THAN 70 milliGRAM(s)/deciliter  glucagon  Injectable 1 milliGRAM(s) IntraMuscular once PRN Glucose LESS THAN 70 milligrams/deciliter    HEPARIN:  [x] YES [] NO  Dose: 5000 UNITS Q8H  LOVENOX:[] YES [x] NO  Dose: XX mg Q24H  COUMADIN: []  YES [x] NO  Dose: XX mg  Q24H  SCD's: YES b/l  GI Prophylaxis: Protonix [x], Pepcid [], None [], (Contra-indication:.....)    Ambulation/Activity Status: ambulate     Assessment/Plan:  68y Female status-post CABGx3 on 10/7/2019 readmit with right pleural effusion, poss pna, and right lower extremity leg pain most likely secondary to neuropathy/cellulitis  - Case and plan discussed with CTU Intensivist and CT Surgeon - Dr. Urbano/Flaco/Shawn   - Continue CTU supportive care    - Continue DVT/GI prophylaxis  - Incentive Spirometry 10 times an hour  - Continue to advance physical activity as tolerated and continue PT/OT as directed  1. CAD: Continue ASA, statin, BB  2. chronic systolic heart failure- cont coreg and isordil, no diuresis for cr of 2, start milrinone 0.25  3. A. Fib: d/c amio, rate control with coreg  4. COPD/Hypoxia: cont nebs, wean o2, right pig tail for pleural effusion; cont cefepime for pna as per ID  5. DM/Glucose Control: cont lantus 20, sliding scale    dispo: home

## 2019-11-08 NOTE — PROGRESS NOTE ADULT - ASSESSMENT
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	Acute on Chronic systolic CHF with low EF - hold diureis today  continue  coeg 6.25 q12 and isordil 10 q8 and hydralazin as tolelated      start Milrinon as discussed in am round with surgons   2.	R loer lobe and middle lobe infiltrate on Ct   3.	Amio held yestrday  prdnison 50 daily  4.	fluid overload -, . on daily Bumex will hold due to elevated BUn ...( could be due to steroid that was started yesterday  5.	hyperkalemia - improved monitor bmp  6.	leg pain - possible cellulitis -  cefepine   d/c vanco lyrica started yestrday ..less leg pain today   7.	RIC - monitor bmp  8.	chronic montejo cathter  9.	DM - high glucose - lantus   10.	DVT / PUD prophylaxis   9 P[place] on ct atb Vanco and Cefpim  d/c vanco    10 - R effusion large draind by me during the round  Jeovanny tube cathter 14 f placed 1400 cc so far will send pt for CT chest  fluid sent for cytology and culturs and LDH and pr  PLAN  Neuro: move all 4 extremities. no sensory or motor deficits  Pain management.   ALPRAZolam 0.25 milliGRAM(s) Oral two times a day PRN  gabapentin 100 milliGRAM(s) Oral two times a day  morphine  - Injectable 2 milliGRAM(s) IV Push every 5 minutes PRN  QUEtiapine 25 milliGRAM(s) Oral at bedtime    Pulm: Wean off supplemental oxygen as able. Daily CXR. Encourage coughing, deep breathing and use of incentive spirometry.   tiotropium 18 MICROgram(s) Capsule 1 Capsule(s) Inhalation daily    Cardio: Monitor telemetry/alarms. Continue supportive care   aMIOdarone    Tablet 200 milliGRAM(s) Oral daily  buMETAnide 1 milliGRAM(s) Oral daily  carvedilol 3.125 milliGRAM(s) Oral every 12 hours  milrinone Infusion 0.375 MICROgram(s)/kG/Min IV Continuous <Continuous>  sacubitril 24 mG/valsartan 26 mG 1 Tablet(s) Oral two times a day  tamsulosin Oral Tab/Cap - Peds 0.4 milliGRAM(s) Oral at bedtime    GI: Continue stool softeners.    pantoprazole    Tablet 40 milliGRAM(s) Oral before breakfast    Nutrition: Continue present diet  Endocrine and glucose control:   atorvastatin 40 milliGRAM(s) Oral at bedtime  dextrose 40% Gel 15 Gram(s) Oral once PRN  dextrose 50% Injectable 12.5 Gram(s) IV Push once  dextrose 50% Injectable 25 Gram(s) IV Push once  dextrose 50% Injectable 25 Gram(s) IV Push once  glucagon  Injectable 1 milliGRAM(s) IntraMuscular once PRN  insulin regular Infusion 1 Unit(s)/Hr IV Continuous <Continuous>    Renal: monitor urine output, supplement electrolytes as needed,     Vasc: Heparin SC and/or SCDs for DVT prophylaxis  aspirin  chewable 81 milliGRAM(s) Oral daily  heparin  Injectable 5000 Unit(s) SubCutaneous every 8 hours    ID: Stable, no fever , no chills.   cefepime   IVPB 1000 milliGRAM(s) IV Intermittent every 12 hours  vancomycin  IVPB 1000 milliGRAM(s) IV Intermittent once    Therapy: OOB/ambulate  Disposition: start planing discharge home or placement    Pertinent clinical, laboratory, radiographic, hemodynamic, echocardiographic, respiratory data, microbiologic data and chart were reviewed and analyzed frequently throughout the course of the day and night. GI and DVT prophylaxis, glycemic control, head of bed elevation and skin care issues were addressed.  Patient seen, examined and plan discussed with CT Surgery / CTICU team during rounds.    [x ] The patient remains in critical and unstable condition, and requires ICU care and monitoring  [ ] The patient is improving but requires continued monitoring and adjustment of therapy

## 2019-11-08 NOTE — PROGRESS NOTE ADULT - SUBJECTIVE AND OBJECTIVE BOX
CTU Attending Progress Daily Note     08 Nov 2019 11:22  POD# - 1 CABG x4  Radial  He has history of Female bladder prolapse  Diabetes    Interval event for past 24 hr:  GUS WILBURN  68y had no event.   Current Complains:  GUS WILBURN has no new complains  HPI:  69 y/o F with h/o CAD s/p CABG, ICM, chronic systolic heart failure with EF ~ 25% coming today with rash in RLE and pain. Patient can't bear any weight. The pain started suddenly. She also notes some SOB but this is not that significant compared to her baseline. She can walk minimally around the house before getting SOB. She denies PND or orthopnea but has pedal edema. No LOC, palpitations, bleeding episodes. (03 Nov 2019 07:05)    OBJECTIVE:  ICU Vital Signs Last 24 Hrs  T(C): 36.3 (08 Nov 2019 08:00), Max: 36.8 (07 Nov 2019 12:00)  T(F): 97.3 (08 Nov 2019 08:00), Max: 98.2 (07 Nov 2019 12:00)  HR: 90 (08 Nov 2019 10:00) (79 - 100)  BP: 107/55 (08 Nov 2019 10:00) (100/56 - 155/67)  BP(mean): 79 (08 Nov 2019 10:00) (67 - 97)  ABP: --  ABP(mean): --  RR: 31 (08 Nov 2019 10:00) (16 - 31)  SpO2: 97% (08 Nov 2019 10:00) (93% - 100%)    I&O's Summary    07 Nov 2019 07:01  -  08 Nov 2019 07:00  --------------------------------------------------------  IN: 1010 mL / OUT: 1360 mL / NET: -350 mL    08 Nov 2019 07:01  -  08 Nov 2019 11:22  --------------------------------------------------------  IN: 372.6 mL / OUT: 245 mL / NET: 127.6 mL      I&O's Detail    07 Nov 2019 07:01  -  08 Nov 2019 07:00  --------------------------------------------------------  IN:    IV PiggyBack: 150 mL    Oral Fluid: 860 mL  Total IN: 1010 mL    OUT:    Chest Tube: 375 mL    Indwelling Catheter - Urethral: 985 mL  Total OUT: 1360 mL    Total NET: -350 mL      08 Nov 2019 07:01  -  08 Nov 2019 11:22  --------------------------------------------------------  IN:    milrinone  Infusion: 12.6 mL    Oral Fluid: 360 mL  Total IN: 372.6 mL    OUT:    Chest Tube: 90 mL    Indwelling Catheter - Urethral: 155 mL  Total OUT: 245 mL    Total NET: 127.6 mL        Adult Advanced Hemodynamics Last 24 Hrs      CAPILLARY BLOOD GLUCOSE      POCT Blood Glucose.: 254 mg/dL (08 Nov 2019 10:59)  POCT Blood Glucose.: 213 mg/dL (08 Nov 2019 07:05)  POCT Blood Glucose.: 183 mg/dL (08 Nov 2019 05:59)  POCT Blood Glucose.: 295 mg/dL (07 Nov 2019 22:35)  POCT Blood Glucose.: 230 mg/dL (07 Nov 2019 16:12)    LABS:                          7.8    6.48  )-----------( 201      ( 08 Nov 2019 03:00 )             25.2     11-08    137  |  99  |  93<HH>  ----------------------------<  208<H>  5.4<H>   |  22  |  2.0<H>    Ca    9.3      08 Nov 2019 03:00  Mg     2.1     11-08    TPro  5.2<L>  /  Alb  3.2<L>  /  TBili  <0.2  /  DBili  x   /  AST  10  /  ALT  7   /  AlkPhos  82  11-08          Culture - Body Fluid with Gram Stain (collected 06 Nov 2019 09:10)  Source: .Body Fluid Pleural fluid  Gram Stain (07 Nov 2019 05:50):    polymorphonuclear leukocytes seen    No organisms seen    by cytocentrifuge  Preliminary Report (07 Nov 2019 18:30):    No growth      Home Medications:  acetaminophen 325 mg oral tablet: 2 tab(s) orally every 6 hours, As needed, Mild Pain (1 - 3) (18 Oct 2019 14:37)    HOSPITAL MEDICATIONS:  MEDICATIONS  (STANDING):  acetaZOLAMIDE  IVPB 500 milliGRAM(s) IV Intermittent daily  albuterol/ipratropium for Nebulization 3 milliLiter(s) Nebulizer every 6 hours  ascorbic acid 500 milliGRAM(s) Oral daily  aspirin  chewable 81 milliGRAM(s) Oral daily  atorvastatin 40 milliGRAM(s) Oral at bedtime  carvedilol 6.25 milliGRAM(s) Oral every 12 hours  cefepime   IVPB 1000 milliGRAM(s) IV Intermittent every 12 hours  dextrose 5%. 1000 milliLiter(s) (50 mL/Hr) IV Continuous <Continuous>  dextrose 50% Injectable 12.5 Gram(s) IV Push once  dextrose 50% Injectable 25 Gram(s) IV Push once  dextrose 50% Injectable 25 Gram(s) IV Push once  heparin  Injectable 5000 Unit(s) SubCutaneous every 8 hours  hydrALAZINE 10 milliGRAM(s) Oral every 8 hours  insulin glargine Injectable (LANTUS) 20 Unit(s) SubCutaneous every morning  insulin lispro (HumaLOG) corrective regimen sliding scale   SubCutaneous three times a day before meals  isosorbide   dinitrate Tablet (ISORDIL) 10 milliGRAM(s) Oral three times a day  metFORMIN 1000 milliGRAM(s) Oral every 12 hours  milrinone Infusion 0.25 MICROgram(s)/kG/Min (4.185 mL/Hr) IV Continuous <Continuous>  pantoprazole    Tablet 40 milliGRAM(s) Oral before breakfast  polyethylene glycol 3350 17 Gram(s) Oral every 24 hours  senna 1 Tablet(s) Oral every 12 hours  sodium chloride 0.9% lock flush 3 milliLiter(s) IV Push every 8 hours  tamsulosin Oral Tab/Cap - Peds 0.4 milliGRAM(s) Oral at bedtime  tiotropium 18 MICROgram(s) Capsule 1 Capsule(s) Inhalation daily  zinc sulfate 220 milliGRAM(s) Oral daily    MEDICATIONS  (PRN):  acetaminophen   Tablet .. 650 milliGRAM(s) Oral every 6 hours PRN Mild Pain (1 - 3)  dextrose 40% Gel 15 Gram(s) Oral once PRN Blood Glucose LESS THAN 70 milliGRAM(s)/deciliter  glucagon  Injectable 1 milliGRAM(s) IntraMuscular once PRN Glucose LESS THAN 70 milligrams/deciliter      REVIEW OF SYSTEMS:  CONSTITUTIONAL: [X] all negative; [ ] weakness, [ ] fevers, [ ] chills  EYES/ENT: [X] all negative; [ ] visual changes, [ ] vertigo, [ ] throat pain   NECK: [X] all negative; [ ] pain, [ ] stiffness  RESPIRATORY: [] all negative, [ ] cough, [ ] wheezing, [ ] hemoptysis, [ ] shortness of breath  CARDIOVASCULAR: [] all negative; [ ] chest pain, [ ] palpitations, [ ] orthopnea  GASTROINTESTINAL: [X] all negative; [ ]abdominal pain, [ ] nausea, [ ] vomiting, [ ] hematemesis, [ ] diarrhea, [ ] constipation, [ ] melena, [ ] hematochezia.  GENITOURINARY: [X] all negative; [ ] dysuria, [ ] frequency, [ ] hematuria  NEUROLOGICAL: [X] all negative; [ ] numbness, [ ] weakness  SKIN: [X] all negative; [ ] itching, [ ] burning, [ ] rashes, [ ] lesions   All other review of systems is negative unless indicated above.    [  ] Unable to assess ROS because     PHYSICAL EXAM:          CONSTITUTIONAL: Well-developed; well-nourished; in no acute distress.   	SKIN: warm, dry  	HEAD: Normocephalic; atraumatic.  	EYES: PERRL, EOM, no conj injection, sclera clear  	ENT: No nasal discharge; airway clear.  	NECK: Supple; non tender.  No midline ttp ctls  	CARD: S1, S2 normal; no murmurs, gallops, or rubs. Regular rate and rhythm. 2+ RPs and DPs bilat, no carotid bruits, no pedal   edema, no calf pain b/l  	RESP: CTA  bilat good air movement No wheezes, rales or rhonchi.  	ABD: Soft, not tender, not distended, no CVA ttp no rebound or guarding, bowel sounds present  	EXT: Normal ROM.  No clubbing, cyanosis or edema.   	  	NEURO: Alert, awake, motor 5/5 R, 5/5 L        RADIOLOGY:  < from: Xray Chest 1 View- PORTABLE-Routine (11.08.19 @ 04:03) >    Impression:      Cardiomegaly with CHF.    Without difference.    < end of copied text >  xray            I spent 45 minutes of critical care time examining patient, reviewing vitals, labs, medications, imaging and discussing with the team goals of care to prevent life-threatening in this patient who is at high risk for deterioration or death due to: CTU Attending Progress Daily Note     08 Nov 2019 11:22    He has history of Female bladder prolapse  Diabetes    Interval event for past 24 hr:  GUS WILBURN  68y had no event.   Current Complains:  GUS WILBURN has no new complains  HPI:  69 y/o F with h/o CAD s/p CABG, ICM, chronic systolic heart failure with EF ~ 25% coming today with rash in RLE and pain. Patient can't bear any weight. The pain started suddenly. She also notes some SOB but this is not that significant compared to her baseline. She can walk minimally around the house before getting SOB. She denies PND or orthopnea but has pedal edema. No LOC, palpitations, bleeding episodes. (03 Nov 2019 07:05)    OBJECTIVE:  ICU Vital Signs Last 24 Hrs  T(C): 36.3 (08 Nov 2019 08:00), Max: 36.8 (07 Nov 2019 12:00)  T(F): 97.3 (08 Nov 2019 08:00), Max: 98.2 (07 Nov 2019 12:00)  HR: 90 (08 Nov 2019 10:00) (79 - 100)  BP: 107/55 (08 Nov 2019 10:00) (100/56 - 155/67)  BP(mean): 79 (08 Nov 2019 10:00) (67 - 97)  ABP: --  ABP(mean): --  RR: 31 (08 Nov 2019 10:00) (16 - 31)  SpO2: 97% (08 Nov 2019 10:00) (93% - 100%)    I&O's Summary    07 Nov 2019 07:01  -  08 Nov 2019 07:00  --------------------------------------------------------  IN: 1010 mL / OUT: 1360 mL / NET: -350 mL    08 Nov 2019 07:01  -  08 Nov 2019 11:22  --------------------------------------------------------  IN: 372.6 mL / OUT: 245 mL / NET: 127.6 mL      I&O's Detail    07 Nov 2019 07:01  -  08 Nov 2019 07:00  --------------------------------------------------------  IN:    IV PiggyBack: 150 mL    Oral Fluid: 860 mL  Total IN: 1010 mL    OUT:    Chest Tube: 375 mL    Indwelling Catheter - Urethral: 985 mL  Total OUT: 1360 mL    Total NET: -350 mL      08 Nov 2019 07:01  -  08 Nov 2019 11:22  --------------------------------------------------------  IN:    milrinone  Infusion: 12.6 mL    Oral Fluid: 360 mL  Total IN: 372.6 mL    OUT:    Chest Tube: 90 mL    Indwelling Catheter - Urethral: 155 mL  Total OUT: 245 mL    Total NET: 127.6 mL        Adult Advanced Hemodynamics Last 24 Hrs      CAPILLARY BLOOD GLUCOSE      POCT Blood Glucose.: 254 mg/dL (08 Nov 2019 10:59)  POCT Blood Glucose.: 213 mg/dL (08 Nov 2019 07:05)  POCT Blood Glucose.: 183 mg/dL (08 Nov 2019 05:59)  POCT Blood Glucose.: 295 mg/dL (07 Nov 2019 22:35)  POCT Blood Glucose.: 230 mg/dL (07 Nov 2019 16:12)    LABS:                          7.8    6.48  )-----------( 201      ( 08 Nov 2019 03:00 )             25.2     11-08    137  |  99  |  93<HH>  ----------------------------<  208<H>  5.4<H>   |  22  |  2.0<H>    Ca    9.3      08 Nov 2019 03:00  Mg     2.1     11-08    TPro  5.2<L>  /  Alb  3.2<L>  /  TBili  <0.2  /  DBili  x   /  AST  10  /  ALT  7   /  AlkPhos  82  11-08          Culture - Body Fluid with Gram Stain (collected 06 Nov 2019 09:10)  Source: .Body Fluid Pleural fluid  Gram Stain (07 Nov 2019 05:50):    polymorphonuclear leukocytes seen    No organisms seen    by cytocentrifuge  Preliminary Report (07 Nov 2019 18:30):    No growth      Home Medications:  acetaminophen 325 mg oral tablet: 2 tab(s) orally every 6 hours, As needed, Mild Pain (1 - 3) (18 Oct 2019 14:37)    HOSPITAL MEDICATIONS:  MEDICATIONS  (STANDING):  acetaZOLAMIDE  IVPB 500 milliGRAM(s) IV Intermittent daily  albuterol/ipratropium for Nebulization 3 milliLiter(s) Nebulizer every 6 hours  ascorbic acid 500 milliGRAM(s) Oral daily  aspirin  chewable 81 milliGRAM(s) Oral daily  atorvastatin 40 milliGRAM(s) Oral at bedtime  carvedilol 6.25 milliGRAM(s) Oral every 12 hours  cefepime   IVPB 1000 milliGRAM(s) IV Intermittent every 12 hours  dextrose 5%. 1000 milliLiter(s) (50 mL/Hr) IV Continuous <Continuous>  dextrose 50% Injectable 12.5 Gram(s) IV Push once  dextrose 50% Injectable 25 Gram(s) IV Push once  dextrose 50% Injectable 25 Gram(s) IV Push once  heparin  Injectable 5000 Unit(s) SubCutaneous every 8 hours  hydrALAZINE 10 milliGRAM(s) Oral every 8 hours  insulin glargine Injectable (LANTUS) 20 Unit(s) SubCutaneous every morning  insulin lispro (HumaLOG) corrective regimen sliding scale   SubCutaneous three times a day before meals  isosorbide   dinitrate Tablet (ISORDIL) 10 milliGRAM(s) Oral three times a day  metFORMIN 1000 milliGRAM(s) Oral every 12 hours  milrinone Infusion 0.25 MICROgram(s)/kG/Min (4.185 mL/Hr) IV Continuous <Continuous>  pantoprazole    Tablet 40 milliGRAM(s) Oral before breakfast  polyethylene glycol 3350 17 Gram(s) Oral every 24 hours  senna 1 Tablet(s) Oral every 12 hours  sodium chloride 0.9% lock flush 3 milliLiter(s) IV Push every 8 hours  tamsulosin Oral Tab/Cap - Peds 0.4 milliGRAM(s) Oral at bedtime  tiotropium 18 MICROgram(s) Capsule 1 Capsule(s) Inhalation daily  zinc sulfate 220 milliGRAM(s) Oral daily    MEDICATIONS  (PRN):  acetaminophen   Tablet .. 650 milliGRAM(s) Oral every 6 hours PRN Mild Pain (1 - 3)  dextrose 40% Gel 15 Gram(s) Oral once PRN Blood Glucose LESS THAN 70 milliGRAM(s)/deciliter  glucagon  Injectable 1 milliGRAM(s) IntraMuscular once PRN Glucose LESS THAN 70 milligrams/deciliter      REVIEW OF SYSTEMS:  CONSTITUTIONAL: [X] all negative; [ ] weakness, [ ] fevers, [ ] chills  EYES/ENT: [X] all negative; [ ] visual changes, [ ] vertigo, [ ] throat pain   NECK: [X] all negative; [ ] pain, [ ] stiffness  RESPIRATORY: [] all negative, [ ] cough, [ ] wheezing, [ ] hemoptysis, [ ] shortness of breath  CARDIOVASCULAR: [] all negative; [ ] chest pain, [ ] palpitations, [ ] orthopnea  GASTROINTESTINAL: [X] all negative; [ ]abdominal pain, [ ] nausea, [ ] vomiting, [ ] hematemesis, [ ] diarrhea, [ ] constipation, [ ] melena, [ ] hematochezia.  GENITOURINARY: [X] all negative; [ ] dysuria, [ ] frequency, [ ] hematuria  NEUROLOGICAL: [X] all negative; [ ] numbness, [ ] weakness  SKIN: [X] all negative; [ ] itching, [ ] burning, [ ] rashes, [ ] lesions   All other review of systems is negative unless indicated above.    [  ] Unable to assess ROS because     PHYSICAL EXAM:          CONSTITUTIONAL: Well-developed; well-nourished; in no acute distress.   	SKIN: warm, dry  	HEAD: Normocephalic; atraumatic.  	EYES: PERRL, EOM, no conj injection, sclera clear  	ENT: No nasal discharge; airway clear.  	NECK: Supple; non tender.  No midline ttp ctls  	CARD: S1, S2 normal; no murmurs, gallops, or rubs. Regular rate and rhythm. 2+ RPs and DPs bilat, no carotid bruits, no pedal   edema, no calf pain b/l  	RESP: CTA  bilat good air movement No wheezes, rales or rhonchi.  	ABD: Soft, not tender, not distended, no CVA ttp no rebound or guarding, bowel sounds present  	EXT: Normal ROM.  No clubbing, cyanosis or edema.   	  	NEURO: Alert, awake, motor 5/5 R, 5/5 L        RADIOLOGY:  < from: Xray Chest 1 View- PORTABLE-Routine (11.08.19 @ 04:03) >    Impression:      Cardiomegaly with CHF.    Without difference.    < end of copied text >  xray

## 2019-11-08 NOTE — PROGRESS NOTE ADULT - PROBLEM SELECTOR PLAN 1
ICM s/p CABG   Fluid overloaded today   S/p pigtail catheter to drain pleural effusion   Patient started on milrinone gtt due to worsening of renal function   Consider giving 1 PRBC given Hb 7.8 gr/dl   Give furosemide 40 mg ivp with blood   Start diuresis with furosemide 40 mg twice daily   Continue Hydralazine 10 mg TID and Isordil 10 mg TID   Continue carvedilol 6.25 mg twice daily    Daily weights   Low salt diet   PT eval   Patient will likely benefit from going to rehab upon discharge  Incentive inspirometry  Consider adding glucerna as supplement   Discussed with CT surgery, CTU team and patient

## 2019-11-08 NOTE — PROGRESS NOTE ADULT - SUBJECTIVE AND OBJECTIVE BOX
Interval history: Patient with depressed mood, renal function slightly worse. Patient remains hemodynamically stable.           HPI:  67 y/o F with h/o CAD s/p CABG, ICM, chronic systolic heart failure with EF ~ 25% coming today with rash in RLE and pain. Patient can't bear any weight. The pain started suddenly. She also notes some SOB but this is not that significant compared to her baseline. She can walk minimally around the house before getting SOB. She denies PND or orthopnea but has pedal edema. No LOC, palpitations, bleeding episodes.       Patient admitted to CTU and started on broad spectrum Abx, started on iv diuretics. She reports feeling slightly better from her RLE pain.     PSH: CABG    Social: No ETOH or smoking

## 2019-11-09 LAB
ALBUMIN SERPL ELPH-MCNC: 3.1 G/DL — LOW (ref 3.5–5.2)
ALP SERPL-CCNC: 77 U/L — SIGNIFICANT CHANGE UP (ref 30–115)
ALT FLD-CCNC: 9 U/L — SIGNIFICANT CHANGE UP (ref 0–41)
ANION GAP SERPL CALC-SCNC: 14 MMOL/L — SIGNIFICANT CHANGE UP (ref 7–14)
AST SERPL-CCNC: 11 U/L — SIGNIFICANT CHANGE UP (ref 0–41)
BILIRUB SERPL-MCNC: <0.2 MG/DL — SIGNIFICANT CHANGE UP (ref 0.2–1.2)
BUN SERPL-MCNC: 105 MG/DL — CRITICAL HIGH (ref 10–20)
CALCIUM SERPL-MCNC: 9.2 MG/DL — SIGNIFICANT CHANGE UP (ref 8.5–10.1)
CHLORIDE SERPL-SCNC: 102 MMOL/L — SIGNIFICANT CHANGE UP (ref 98–110)
CO2 SERPL-SCNC: 23 MMOL/L — SIGNIFICANT CHANGE UP (ref 17–32)
CREAT SERPL-MCNC: 2.1 MG/DL — HIGH (ref 0.7–1.5)
ESTIMATED AVERAGE GLUCOSE: 117 MG/DL — HIGH (ref 68–114)
GLUCOSE BLDC GLUCOMTR-MCNC: 110 MG/DL — HIGH (ref 70–99)
GLUCOSE BLDC GLUCOMTR-MCNC: 118 MG/DL — HIGH (ref 70–99)
GLUCOSE BLDC GLUCOMTR-MCNC: 172 MG/DL — HIGH (ref 70–99)
GLUCOSE BLDC GLUCOMTR-MCNC: 75 MG/DL — SIGNIFICANT CHANGE UP (ref 70–99)
GLUCOSE SERPL-MCNC: 192 MG/DL — HIGH (ref 70–99)
HBA1C BLD-MCNC: 5.7 % — HIGH (ref 4–5.6)
HCT VFR BLD CALC: 28.2 % — LOW (ref 37–47)
HGB BLD-MCNC: 8.8 G/DL — LOW (ref 12–16)
MAGNESIUM SERPL-MCNC: 2 MG/DL — SIGNIFICANT CHANGE UP (ref 1.8–2.4)
MCHC RBC-ENTMCNC: 28.3 PG — SIGNIFICANT CHANGE UP (ref 27–31)
MCHC RBC-ENTMCNC: 31.2 G/DL — LOW (ref 32–37)
MCV RBC AUTO: 90.7 FL — SIGNIFICANT CHANGE UP (ref 81–99)
NRBC # BLD: 0 /100 WBCS — SIGNIFICANT CHANGE UP (ref 0–0)
PLATELET # BLD AUTO: 177 K/UL — SIGNIFICANT CHANGE UP (ref 130–400)
POTASSIUM SERPL-MCNC: 5.3 MMOL/L — HIGH (ref 3.5–5)
POTASSIUM SERPL-SCNC: 5.3 MMOL/L — HIGH (ref 3.5–5)
PROT SERPL-MCNC: 5.1 G/DL — LOW (ref 6–8)
RBC # BLD: 3.11 M/UL — LOW (ref 4.2–5.4)
RBC # FLD: 16.2 % — HIGH (ref 11.5–14.5)
SODIUM SERPL-SCNC: 139 MMOL/L — SIGNIFICANT CHANGE UP (ref 135–146)
WBC # BLD: 6.06 K/UL — SIGNIFICANT CHANGE UP (ref 4.8–10.8)
WBC # FLD AUTO: 6.06 K/UL — SIGNIFICANT CHANGE UP (ref 4.8–10.8)

## 2019-11-09 PROCEDURE — 99024 POSTOP FOLLOW-UP VISIT: CPT

## 2019-11-09 PROCEDURE — 99291 CRITICAL CARE FIRST HOUR: CPT

## 2019-11-09 PROCEDURE — 71045 X-RAY EXAM CHEST 1 VIEW: CPT | Mod: 26

## 2019-11-09 RX ORDER — SERTRALINE 25 MG/1
25 TABLET, FILM COATED ORAL DAILY
Refills: 0 | Status: DISCONTINUED | OUTPATIENT
Start: 2019-11-09 | End: 2019-11-19

## 2019-11-09 RX ORDER — SODIUM CHLORIDE 9 MG/ML
1000 INJECTION INTRAMUSCULAR; INTRAVENOUS; SUBCUTANEOUS
Refills: 0 | Status: DISCONTINUED | OUTPATIENT
Start: 2019-11-09 | End: 2019-11-15

## 2019-11-09 RX ORDER — OXYCODONE HYDROCHLORIDE 5 MG/1
5 TABLET ORAL AT BEDTIME
Refills: 0 | Status: DISCONTINUED | OUTPATIENT
Start: 2019-11-09 | End: 2019-11-16

## 2019-11-09 RX ADMIN — HEPARIN SODIUM 5000 UNIT(S): 5000 INJECTION INTRAVENOUS; SUBCUTANEOUS at 13:48

## 2019-11-09 RX ADMIN — ISOSORBIDE DINITRATE 10 MILLIGRAM(S): 5 TABLET ORAL at 05:19

## 2019-11-09 RX ADMIN — SODIUM CHLORIDE 10 MILLILITER(S): 9 INJECTION INTRAMUSCULAR; INTRAVENOUS; SUBCUTANEOUS at 09:02

## 2019-11-09 RX ADMIN — Medication 500 MILLIGRAM(S): at 12:47

## 2019-11-09 RX ADMIN — OXYCODONE HYDROCHLORIDE 5 MILLIGRAM(S): 5 TABLET ORAL at 22:00

## 2019-11-09 RX ADMIN — CARVEDILOL PHOSPHATE 6.25 MILLIGRAM(S): 80 CAPSULE, EXTENDED RELEASE ORAL at 17:57

## 2019-11-09 RX ADMIN — SERTRALINE 25 MILLIGRAM(S): 25 TABLET, FILM COATED ORAL at 12:46

## 2019-11-09 RX ADMIN — CEFEPIME 100 MILLIGRAM(S): 1 INJECTION, POWDER, FOR SOLUTION INTRAMUSCULAR; INTRAVENOUS at 05:20

## 2019-11-09 RX ADMIN — METFORMIN HYDROCHLORIDE 1000 MILLIGRAM(S): 850 TABLET ORAL at 17:54

## 2019-11-09 RX ADMIN — ZINC SULFATE TAB 220 MG (50 MG ZINC EQUIVALENT) 220 MILLIGRAM(S): 220 (50 ZN) TAB at 12:47

## 2019-11-09 RX ADMIN — METFORMIN HYDROCHLORIDE 1000 MILLIGRAM(S): 850 TABLET ORAL at 05:19

## 2019-11-09 RX ADMIN — Medication 650 MILLIGRAM(S): at 09:40

## 2019-11-09 RX ADMIN — Medication 650 MILLIGRAM(S): at 00:00

## 2019-11-09 RX ADMIN — SENNA PLUS 1 TABLET(S): 8.6 TABLET ORAL at 05:19

## 2019-11-09 RX ADMIN — SODIUM CHLORIDE 3 MILLILITER(S): 9 INJECTION INTRAMUSCULAR; INTRAVENOUS; SUBCUTANEOUS at 05:18

## 2019-11-09 RX ADMIN — SODIUM CHLORIDE 3 MILLILITER(S): 9 INJECTION INTRAMUSCULAR; INTRAVENOUS; SUBCUTANEOUS at 21:40

## 2019-11-09 RX ADMIN — INSULIN GLARGINE 20 UNIT(S): 100 INJECTION, SOLUTION SUBCUTANEOUS at 08:00

## 2019-11-09 RX ADMIN — ATORVASTATIN CALCIUM 40 MILLIGRAM(S): 80 TABLET, FILM COATED ORAL at 21:37

## 2019-11-09 RX ADMIN — SENNA PLUS 1 TABLET(S): 8.6 TABLET ORAL at 17:55

## 2019-11-09 RX ADMIN — HEPARIN SODIUM 5000 UNIT(S): 5000 INJECTION INTRAVENOUS; SUBCUTANEOUS at 21:37

## 2019-11-09 RX ADMIN — OXYCODONE HYDROCHLORIDE 5 MILLIGRAM(S): 5 TABLET ORAL at 21:38

## 2019-11-09 RX ADMIN — Medication 650 MILLIGRAM(S): at 09:10

## 2019-11-09 RX ADMIN — Medication 10 MILLIGRAM(S): at 05:19

## 2019-11-09 RX ADMIN — TAMSULOSIN HYDROCHLORIDE 0.4 MILLIGRAM(S): 0.4 CAPSULE ORAL at 21:37

## 2019-11-09 RX ADMIN — CARVEDILOL PHOSPHATE 6.25 MILLIGRAM(S): 80 CAPSULE, EXTENDED RELEASE ORAL at 05:19

## 2019-11-09 RX ADMIN — HEPARIN SODIUM 5000 UNIT(S): 5000 INJECTION INTRAVENOUS; SUBCUTANEOUS at 05:20

## 2019-11-09 RX ADMIN — Medication 3 MILLILITER(S): at 09:08

## 2019-11-09 RX ADMIN — Medication 81 MILLIGRAM(S): at 12:47

## 2019-11-09 RX ADMIN — PANTOPRAZOLE SODIUM 40 MILLIGRAM(S): 20 TABLET, DELAYED RELEASE ORAL at 06:21

## 2019-11-09 RX ADMIN — Medication 2: at 07:30

## 2019-11-09 RX ADMIN — Medication 3 MILLILITER(S): at 15:26

## 2019-11-09 RX ADMIN — POLYETHYLENE GLYCOL 3350 17 GRAM(S): 17 POWDER, FOR SOLUTION ORAL at 17:54

## 2019-11-09 RX ADMIN — Medication 650 MILLIGRAM(S): at 17:55

## 2019-11-09 NOTE — PROGRESS NOTE ADULT - SUBJECTIVE AND OBJECTIVE BOX
SUBJECTIVE ASSESSMENT:  pt doesn't feel good, and cries occasionally    Vital Signs Last 24 Hrs  T(C): 36.2 (09 Nov 2019 00:30), Max: 36.2 (08 Nov 2019 20:00)  T(F): 97.2 (09 Nov 2019 00:30), Max: 97.2 (08 Nov 2019 20:00)  HR: 75 (09 Nov 2019 07:00) (75 - 101)  BP: 96/53 (09 Nov 2019 07:00) (90/55 - 120/69)  BP(mean): 71 (09 Nov 2019 07:00) (56 - 83)  RR: 17 (09 Nov 2019 07:00) (16 - 27)  SpO2: 99% (09 Nov 2019 07:00) (95% - 100%)  11-08-19 @ 07:01  -  11-09-19 @ 07:00  --------------------------------------------------------  IN: 1473.7 mL / OUT: 1530 mL / NET: -56.3 mL    A&OX3 in NAD  decreased bs at the base on the left  sternum stable, no drainage  nl s1, s2  abd soft/NT/ND  +1-2 peripheral edema    LABS:                        8.8    6.06  )-----------( 177      ( 09 Nov 2019 01:20 )             28.2     11-09    139  |  102  |  105<HH>  ----------------------------<  192<H>  5.3<H>   |  23  |  2.1<H>    Ca    9.2      09 Nov 2019 01:20  Mg     2.0     11-09    TPro  5.1<L>  /  Alb  3.1<L>  /  TBili  <0.2  /  DBili  x   /  AST  11  /  ALT  9   /  AlkPhos  77  11-09    MEDICATIONS  (STANDING):  albuterol/ipratropium for Nebulization 3 milliLiter(s) Nebulizer every 6 hours  ascorbic acid 500 milliGRAM(s) Oral daily  aspirin  chewable 81 milliGRAM(s) Oral daily  atorvastatin 40 milliGRAM(s) Oral at bedtime  carvedilol 6.25 milliGRAM(s) Oral every 12 hours  cefepime   IVPB 1000 milliGRAM(s) IV Intermittent every 12 hours  heparin  Injectable 5000 Unit(s) SubCutaneous every 8 hours  insulin glargine Injectable (LANTUS) 20 Unit(s) SubCutaneous every morning  insulin lispro (HumaLOG) corrective regimen sliding scale   SubCutaneous three times a day before meals  metFORMIN 1000 milliGRAM(s) Oral every 12 hours  milrinone Infusion 0.25 MICROgram(s)/kG/Min (4.185 mL/Hr) IV Continuous <Continuous>  pantoprazole    Tablet 40 milliGRAM(s) Oral before breakfast  polyethylene glycol 3350 17 Gram(s) Oral every 24 hours  senna 1 Tablet(s) Oral every 12 hours  tamsulosin Oral Tab/Cap - Peds 0.4 milliGRAM(s) Oral at bedtime  tiotropium 18 MICROgram(s) Capsule 1 Capsule(s) Inhalation daily  zinc sulfate 220 milliGRAM(s) Oral daily    MEDICATIONS  (PRN):  acetaminophen   Tablet .. 650 milliGRAM(s) Oral every 6 hours PRN Mild Pain (1 - 3)  dextrose 40% Gel 15 Gram(s) Oral once PRN Blood Glucose LESS THAN 70 milliGRAM(s)/deciliter  glucagon  Injectable 1 milliGRAM(s) IntraMuscular once PRN Glucose LESS THAN 70 milligrams/deciliter  oxyCODONE    IR 5 milliGRAM(s) Oral at bedtime PRN Mild Pain (1 - 3)

## 2019-11-09 NOTE — PROGRESS NOTE ADULT - SUBJECTIVE AND OBJECTIVE BOX
CTU Attending Progress Daily Note     09 Nov 2019 08:45    Procedure:           CABG                                       POD#          readmit         Patient seen as post-op critical care follow-up    HPI:  69 y/o F with h/o CAD s/p CABG, ICM, chronic systolic heart failure with EF ~ 25% coming today with rash in RLE and pain. Patient can't bear any weight. The pain started suddenly. She also notes some SOB but this is not that significant compared to her baseline. She can walk minimally around the house before getting SOB. She denies PND or orthopnea but has pedal edema. No LOC, palpitations, bleeding episodes. (03 Nov 2019 07:05)    See preop testing chart H&P    Interval event for past 24 hr:  GUS WILBURN  68y started on primacor for RIC on CKD in context of acute on chronic systolic CHF    Current Complains:  GUS WILBURN has no new complaints    REVIEW OF SYSTEMS:  CONSTITUTIONAL:  [-] weakness, [-] fevers, [-] chills  EYES/ENT: [-] visual changes, [-] vertigo, [-] throat pain   NECK: [-] pain, [-] stiffness  RESPIRATORY: [-] cough, [-] wheezing, [-] hemoptysis, [-] shortness of breath  CARDIOVASCULAR: [-] chest pain, [-] palpitations, [-] orthopnea  GASTROINTESTINAL:    [-]abdominal pain, [-] nausea, [-] vomiting, [-] hematemesis, [-] diarrhea, [-] constipation, [-] melena, [-] hematochezia.  GENITOURINARY: [-] dysuria, [-] frequency, [-] hematuria  NEUROLOGICAL: [-] numbness, [-] weakness  SKIN: [-] itching, [-] burning, [-] rashes, [-] lesions   All other review of systems is negative unless indicated above.    [  ] Unable to assess ROS because :    OBJECTIVE:  ICU Vital Signs Last 24 Hrs  T(C): 36.2 (09 Nov 2019 00:30), Max: 36.2 (08 Nov 2019 20:00)  T(F): 97.2 (09 Nov 2019 00:30), Max: 97.2 (08 Nov 2019 20:00)  HR: 75 (09 Nov 2019 07:00) (75 - 101)  BP: 96/53 (09 Nov 2019 07:00) (90/55 - 120/69)  BP(mean): 71 (09 Nov 2019 07:00) (56 - 83)  ABP: --  ABP(mean): --  RR: 17 (09 Nov 2019 07:00) (16 - 31)  SpO2: 99% (09 Nov 2019 07:00) (95% - 100%)      I&O's Summary    08 Nov 2019 07:01  -  09 Nov 2019 07:00  --------------------------------------------------------  IN: 1473.7 mL / OUT: 1530 mL / NET: -56.3 mL      PHYSICAL EXAM:  General: WN/WD NAD    HEENT:     [+] NCAT  [+] EOMI  [-] Conjuctival edema   [-] Icterus   [-] Thrush   [-] ETT  [-] NGT/OGT    Neck:         [+] FROM   [-] JVD     [-] Nodes     [-] Masses    [+] Mid-line trachea    [-] Tracheostomy    Chest:         [-] Sternal click   [-] Sternal drainage + Rt pleural pigtail    Lungs:          [+] CTA   [-] Rhonchi   [-] Rales    [-] Wheezing    [-] Decreased BS    [-] Dullness R L    Cardiac:       [+] S1 [+] S2    [+] RRR   [-] Irregular   [-] S3   [-] S4    [-] Murmurs    [-] Rub    Abdomen:    [+] BS    [+] Soft    [+] Non-tender     [-] Distended    [-] Organomegaly  [-] PEG    Extremities:   [-] Cyanosis U/L   [-] Clubbing  U/L  [-] LE/UE Edema   [+] Capillary refill    [+] Pulses     Neuro:        [+] Awake   [+]  Alert   [-] Confused   [-] Lethargic   [-] Sedated   [-] Generalized Weakness    Skin:        [-] Rashes    [-] Erythema   [+] Normal incisions   [+] IV sites intact   [-] Sacral decubitus    Tubes:  LINES:    CAPILLARY BLOOD GLUCOSE      POCT Blood Glucose.: 172 mg/dL (09 Nov 2019 06:51)    CAPILLARY BLOOD GLUCOSE      POCT Blood Glucose.: 172 mg/dL (09 Nov 2019 06:51)  POCT Blood Glucose.: 230 mg/dL (08 Nov 2019 21:46)  POCT Blood Glucose.: 268 mg/dL (08 Nov 2019 16:50)  POCT Blood Glucose.: 254 mg/dL (08 Nov 2019 10:59)      HOSPITAL MEDICATIONS:  MEDICATIONS  (STANDING):  albuterol/ipratropium for Nebulization 3 milliLiter(s) Nebulizer every 6 hours  ascorbic acid 500 milliGRAM(s) Oral daily  aspirin  chewable 81 milliGRAM(s) Oral daily  atorvastatin 40 milliGRAM(s) Oral at bedtime  carvedilol 6.25 milliGRAM(s) Oral every 12 hours  cefepime   IVPB 1000 milliGRAM(s) IV Intermittent every 12 hours  dextrose 5%. 1000 milliLiter(s) (50 mL/Hr) IV Continuous <Continuous>  dextrose 50% Injectable 12.5 Gram(s) IV Push once  dextrose 50% Injectable 25 Gram(s) IV Push once  dextrose 50% Injectable 25 Gram(s) IV Push once  heparin  Injectable 5000 Unit(s) SubCutaneous every 8 hours  hydrALAZINE 10 milliGRAM(s) Oral every 8 hours  insulin glargine Injectable (LANTUS) 20 Unit(s) SubCutaneous every morning  insulin lispro (HumaLOG) corrective regimen sliding scale   SubCutaneous three times a day before meals  isosorbide   dinitrate Tablet (ISORDIL) 10 milliGRAM(s) Oral three times a day  metFORMIN 1000 milliGRAM(s) Oral every 12 hours  milrinone Infusion 0.25 MICROgram(s)/kG/Min (4.185 mL/Hr) IV Continuous <Continuous>  pantoprazole    Tablet 40 milliGRAM(s) Oral before breakfast  polyethylene glycol 3350 17 Gram(s) Oral every 24 hours  senna 1 Tablet(s) Oral every 12 hours  sodium chloride 0.9% lock flush 3 milliLiter(s) IV Push every 8 hours  tamsulosin Oral Tab/Cap - Peds 0.4 milliGRAM(s) Oral at bedtime  tiotropium 18 MICROgram(s) Capsule 1 Capsule(s) Inhalation daily  zinc sulfate 220 milliGRAM(s) Oral daily    MEDICATIONS  (PRN):  acetaminophen   Tablet .. 650 milliGRAM(s) Oral every 6 hours PRN Mild Pain (1 - 3)  dextrose 40% Gel 15 Gram(s) Oral once PRN Blood Glucose LESS THAN 70 milliGRAM(s)/deciliter  glucagon  Injectable 1 milliGRAM(s) IntraMuscular once PRN Glucose LESS THAN 70 milligrams/deciliter      LABS:                          8.8    6.06  )-----------( 177      ( 09 Nov 2019 01:20 )             28.2     11-09    139  |  102  |  105<HH>  ----------------------------<  192<H>  5.3<H>   |  23  |  2.1<H>    Ca    9.2      09 Nov 2019 01:20  Mg     2.0     11-09    TPro  5.1<L>  /  Alb  3.1<L>  /  TBili  <0.2  /  DBili  x   /  AST  11  /  ALT  9   /  AlkPhos  77  11-09          Culture - Body Fluid with Gram Stain (collected 06 Nov 2019 09:10)  Source: .Body Fluid Pleural fluid  Gram Stain (07 Nov 2019 05:50):    polymorphonuclear leukocytes seen    No organisms seen    by cytocentrifuge  Preliminary Report (07 Nov 2019 18:30):    No growth        RADIOLOGY:  Reviewed and interpreted by me  CXR from 11-09-19 shows [+] mild congestion, [+] small rt apical pneumothorax, [+] R/L effusion, [-] cardiomegaly,   R Chest Tubes in place    ECG:  none    Assessment:  CAD SP CABG  PNA  pleural effusion SP R thoracostomy tube  on primacor for RIC on CKD in context of acute on chronic systolic CHF    PAST MEDICAL & SURGICAL HISTORY:  Female bladder prolapse  Diabetes  History of repair of hip fracture      PLAN:  Neuro: Pain control  Pulm: Encourage coughing, deep breathing and use of incentive spirometry. Wean off supplemental oxygen as able. Daily CXR.   Cardio: Monitor telemetry/alarms. Continue cardiac meds  GI: Tolerating diet. Continue stool softeners. Continue GI prophylaxis  Renal: monitor urine output, supplement electrolytes as needed  Vasc: Heparin SC/SCDs for DVT prophylaxis  Heme: Monitor H/H.   ID: On antibiotics. Stable.  Endocrine: Monitor finger stick blood sugar and control hyperglycemia with insulin  Physical Therapy: OOB/ambulate  Tubes: Monitor Chest tube output      Discussed with Cardiothoracic Team at AM rounds.    45 minutes of critical care time spent providing medical care for patient's acute illness/conditions that impairs at least one vital organ system and/or poses a high risk of imminent or life threatening deterioration in the patient's condition. It includes time spent evaluating and treating the patient's acute illness as well as time spent reviewing labs, radiology, discussing goals of care with patient and/or patient's family, and discussing the case with a multidisciplinary team in an effort to prevent further life threatening deterioration or end organ damage. This time is independent of any procedures performed.

## 2019-11-10 LAB
ALBUMIN SERPL ELPH-MCNC: 3.3 G/DL — LOW (ref 3.5–5.2)
ALP SERPL-CCNC: 75 U/L — SIGNIFICANT CHANGE UP (ref 30–115)
ALT FLD-CCNC: 9 U/L — SIGNIFICANT CHANGE UP (ref 0–41)
ANION GAP SERPL CALC-SCNC: 9 MMOL/L — SIGNIFICANT CHANGE UP (ref 7–14)
AST SERPL-CCNC: 9 U/L — SIGNIFICANT CHANGE UP (ref 0–41)
BILIRUB SERPL-MCNC: <0.2 MG/DL — SIGNIFICANT CHANGE UP (ref 0.2–1.2)
BUN SERPL-MCNC: 109 MG/DL — CRITICAL HIGH (ref 10–20)
CALCIUM SERPL-MCNC: 9.6 MG/DL — SIGNIFICANT CHANGE UP (ref 8.5–10.1)
CHLORIDE SERPL-SCNC: 105 MMOL/L — SIGNIFICANT CHANGE UP (ref 98–110)
CO2 SERPL-SCNC: 25 MMOL/L — SIGNIFICANT CHANGE UP (ref 17–32)
CREAT SERPL-MCNC: 1.9 MG/DL — HIGH (ref 0.7–1.5)
GLUCOSE BLDC GLUCOMTR-MCNC: 106 MG/DL — HIGH (ref 70–99)
GLUCOSE BLDC GLUCOMTR-MCNC: 107 MG/DL — HIGH (ref 70–99)
GLUCOSE BLDC GLUCOMTR-MCNC: 115 MG/DL — HIGH (ref 70–99)
GLUCOSE SERPL-MCNC: 78 MG/DL — SIGNIFICANT CHANGE UP (ref 70–99)
HCT VFR BLD CALC: 30 % — LOW (ref 37–47)
HGB BLD-MCNC: 9.3 G/DL — LOW (ref 12–16)
MAGNESIUM SERPL-MCNC: 2 MG/DL — SIGNIFICANT CHANGE UP (ref 1.8–2.4)
MCHC RBC-ENTMCNC: 28.4 PG — SIGNIFICANT CHANGE UP (ref 27–31)
MCHC RBC-ENTMCNC: 31 G/DL — LOW (ref 32–37)
MCV RBC AUTO: 91.5 FL — SIGNIFICANT CHANGE UP (ref 81–99)
NRBC # BLD: 0 /100 WBCS — SIGNIFICANT CHANGE UP (ref 0–0)
NT-PROBNP SERPL-SCNC: 9848 PG/ML — HIGH (ref 0–300)
PLATELET # BLD AUTO: 183 K/UL — SIGNIFICANT CHANGE UP (ref 130–400)
POTASSIUM SERPL-MCNC: 5.2 MMOL/L — HIGH (ref 3.5–5)
POTASSIUM SERPL-SCNC: 5.2 MMOL/L — HIGH (ref 3.5–5)
PROT SERPL-MCNC: 5.4 G/DL — LOW (ref 6–8)
RBC # BLD: 3.28 M/UL — LOW (ref 4.2–5.4)
RBC # FLD: 16.2 % — HIGH (ref 11.5–14.5)
SODIUM SERPL-SCNC: 139 MMOL/L — SIGNIFICANT CHANGE UP (ref 135–146)
WBC # BLD: 6.6 K/UL — SIGNIFICANT CHANGE UP (ref 4.8–10.8)
WBC # FLD AUTO: 6.6 K/UL — SIGNIFICANT CHANGE UP (ref 4.8–10.8)

## 2019-11-10 PROCEDURE — 99291 CRITICAL CARE FIRST HOUR: CPT

## 2019-11-10 PROCEDURE — 71046 X-RAY EXAM CHEST 2 VIEWS: CPT | Mod: 26

## 2019-11-10 PROCEDURE — 99024 POSTOP FOLLOW-UP VISIT: CPT

## 2019-11-10 PROCEDURE — 71045 X-RAY EXAM CHEST 1 VIEW: CPT | Mod: 26,59

## 2019-11-10 RX ORDER — MEGESTROL ACETATE 40 MG/ML
20 SUSPENSION ORAL THREE TIMES A DAY
Refills: 0 | Status: DISCONTINUED | OUTPATIENT
Start: 2019-11-10 | End: 2019-11-10

## 2019-11-10 RX ORDER — MEGESTROL ACETATE 40 MG/ML
800 SUSPENSION ORAL DAILY
Refills: 0 | Status: DISCONTINUED | OUTPATIENT
Start: 2019-11-10 | End: 2019-11-10

## 2019-11-10 RX ORDER — ONDANSETRON 8 MG/1
4 TABLET, FILM COATED ORAL ONCE
Refills: 0 | Status: COMPLETED | OUTPATIENT
Start: 2019-11-10 | End: 2019-11-10

## 2019-11-10 RX ORDER — ALBUMIN HUMAN 25 %
100 VIAL (ML) INTRAVENOUS ONCE
Refills: 0 | Status: COMPLETED | OUTPATIENT
Start: 2019-11-10 | End: 2019-11-10

## 2019-11-10 RX ORDER — MEGESTROL ACETATE 40 MG/ML
800 SUSPENSION ORAL DAILY
Refills: 0 | Status: DISCONTINUED | OUTPATIENT
Start: 2019-11-10 | End: 2019-11-19

## 2019-11-10 RX ADMIN — METFORMIN HYDROCHLORIDE 1000 MILLIGRAM(S): 850 TABLET ORAL at 17:13

## 2019-11-10 RX ADMIN — ZINC SULFATE TAB 220 MG (50 MG ZINC EQUIVALENT) 220 MILLIGRAM(S): 220 (50 ZN) TAB at 11:52

## 2019-11-10 RX ADMIN — CARVEDILOL PHOSPHATE 6.25 MILLIGRAM(S): 80 CAPSULE, EXTENDED RELEASE ORAL at 17:13

## 2019-11-10 RX ADMIN — Medication 50 MILLILITER(S): at 10:22

## 2019-11-10 RX ADMIN — SODIUM CHLORIDE 3 MILLILITER(S): 9 INJECTION INTRAMUSCULAR; INTRAVENOUS; SUBCUTANEOUS at 21:05

## 2019-11-10 RX ADMIN — HEPARIN SODIUM 5000 UNIT(S): 5000 INJECTION INTRAVENOUS; SUBCUTANEOUS at 14:50

## 2019-11-10 RX ADMIN — SODIUM CHLORIDE 3 MILLILITER(S): 9 INJECTION INTRAMUSCULAR; INTRAVENOUS; SUBCUTANEOUS at 15:11

## 2019-11-10 RX ADMIN — INSULIN GLARGINE 20 UNIT(S): 100 INJECTION, SOLUTION SUBCUTANEOUS at 07:39

## 2019-11-10 RX ADMIN — Medication 500 MILLIGRAM(S): at 11:52

## 2019-11-10 RX ADMIN — METFORMIN HYDROCHLORIDE 1000 MILLIGRAM(S): 850 TABLET ORAL at 05:55

## 2019-11-10 RX ADMIN — Medication 3 MILLILITER(S): at 09:09

## 2019-11-10 RX ADMIN — MEGESTROL ACETATE 800 MILLIGRAM(S): 40 SUSPENSION ORAL at 12:37

## 2019-11-10 RX ADMIN — SERTRALINE 25 MILLIGRAM(S): 25 TABLET, FILM COATED ORAL at 11:52

## 2019-11-10 RX ADMIN — OXYCODONE HYDROCHLORIDE 5 MILLIGRAM(S): 5 TABLET ORAL at 20:49

## 2019-11-10 RX ADMIN — PANTOPRAZOLE SODIUM 40 MILLIGRAM(S): 20 TABLET, DELAYED RELEASE ORAL at 06:01

## 2019-11-10 RX ADMIN — HEPARIN SODIUM 5000 UNIT(S): 5000 INJECTION INTRAVENOUS; SUBCUTANEOUS at 21:05

## 2019-11-10 RX ADMIN — Medication 81 MILLIGRAM(S): at 11:52

## 2019-11-10 RX ADMIN — ATORVASTATIN CALCIUM 40 MILLIGRAM(S): 80 TABLET, FILM COATED ORAL at 21:05

## 2019-11-10 RX ADMIN — CARVEDILOL PHOSPHATE 6.25 MILLIGRAM(S): 80 CAPSULE, EXTENDED RELEASE ORAL at 05:55

## 2019-11-10 RX ADMIN — OXYCODONE HYDROCHLORIDE 5 MILLIGRAM(S): 5 TABLET ORAL at 20:17

## 2019-11-10 RX ADMIN — HEPARIN SODIUM 5000 UNIT(S): 5000 INJECTION INTRAVENOUS; SUBCUTANEOUS at 05:55

## 2019-11-10 RX ADMIN — Medication 3 MILLILITER(S): at 14:27

## 2019-11-10 RX ADMIN — ONDANSETRON 4 MILLIGRAM(S): 8 TABLET, FILM COATED ORAL at 12:41

## 2019-11-10 RX ADMIN — SODIUM CHLORIDE 3 MILLILITER(S): 9 INJECTION INTRAMUSCULAR; INTRAVENOUS; SUBCUTANEOUS at 06:01

## 2019-11-10 NOTE — PROGRESS NOTE ADULT - SUBJECTIVE AND OBJECTIVE BOX
SUBJECTIVE ASSESSMENT:  pt feels the same    Vital Signs Last 24 Hrs  T(C): 36.4 (10 Nov 2019 07:00), Max: 36.4 (10 Nov 2019 04:00)  T(F): 97.5 (10 Nov 2019 07:00), Max: 97.6 (10 Nov 2019 04:00)  HR: 87 (10 Nov 2019 08:00) (79 - 93)  BP: 118/57 (10 Nov 2019 08:00) (93/51 - 118/57)  BP(mean): 82 (10 Nov 2019 08:00) (67 - 83)  RR: 28 (10 Nov 2019 08:00) (15 - 28)  SpO2: 97% (10 Nov 2019 08:00) (96% - 100%)  11-09-19 @ 07:01  -  11-10-19 @ 07:00  --------------------------------------------------------  IN: 1650.4 mL / OUT: 1495 mL / NET: 155.4 mL    11-10-19 @ 07:01  -  11-10-19 @ 08:37  --------------------------------------------------------  IN: 14.2 mL / OUT: 135 mL / NET: -120.8 mL    A&OX3 in NAD  CTA bilat  sternum stable, no drainage  nl s1, s2  abd soft/NT/ND  +2 peripheral edema  SVG harvest site is healing well  radial artery harvest site is healing well    LABS:                        9.3    6.60  )-----------( 183      ( 10 Nov 2019 02:00 )             30.0     11-10    139  |  105  |  109<HH>  ----------------------------<  78  5.2<H>   |  25  |  1.9<H>    Ca    9.6      10 Nov 2019 02:00  Mg     2.0     11-10    TPro  5.4<L>  /  Alb  3.3<L>  /  TBili  <0.2  /  DBili  x   /  AST  9   /  ALT  9   /  AlkPhos  75  11-10    MEDICATIONS  (STANDING):  albuterol/ipratropium for Nebulization 3 milliLiter(s) Nebulizer every 6 hours  ascorbic acid 500 milliGRAM(s) Oral daily  aspirin  chewable 81 milliGRAM(s) Oral daily  atorvastatin 40 milliGRAM(s) Oral at bedtime  carvedilol 6.25 milliGRAM(s) Oral every 12 hours  heparin  Injectable 5000 Unit(s) SubCutaneous every 8 hours  insulin glargine Injectable (LANTUS) 20 Unit(s) SubCutaneous every morning  insulin lispro (HumaLOG) corrective regimen sliding scale   SubCutaneous three times a day before meals  metFORMIN 1000 milliGRAM(s) Oral every 12 hours  milrinone Infusion 0.25 MICROgram(s)/kG/Min (4.185 mL/Hr) IV Continuous <Continuous>  pantoprazole    Tablet 40 milliGRAM(s) Oral before breakfast  polyethylene glycol 3350 17 Gram(s) Oral every 24 hours  senna 1 Tablet(s) Oral every 12 hours  sertraline 25 milliGRAM(s) Oral daily  tamsulosin Oral Tab/Cap - Peds 0.4 milliGRAM(s) Oral at bedtime  tiotropium 18 MICROgram(s) Capsule 1 Capsule(s) Inhalation daily  zinc sulfate 220 milliGRAM(s) Oral daily    MEDICATIONS  (PRN):  acetaminophen   Tablet .. 650 milliGRAM(s) Oral every 6 hours PRN Mild Pain (1 - 3)  dextrose 40% Gel 15 Gram(s) Oral once PRN Blood Glucose LESS THAN 70 milliGRAM(s)/deciliter  glucagon  Injectable 1 milliGRAM(s) IntraMuscular once PRN Glucose LESS THAN 70 milligrams/deciliter  oxyCODONE    IR 5 milliGRAM(s) Oral at bedtime PRN Mild Pain (1 - 3)

## 2019-11-10 NOTE — PROGRESS NOTE ADULT - SUBJECTIVE AND OBJECTIVE BOX
67 yo F w uncontrolled DM (non-compliant), viral myopathy, Paroxysmal Afib, recent labial abscess s/p ID, hip fracture s/p fixation, recent LGIB bleed now presents for shortness of breath. Patient has been having shortness of breath. SOB occurs on exertion and stationary however no worsening of breathing when laying down. Patient denied abdominal pain however family reports she has been having some abdominal discomfort over the last day. Patient also has an indwelling Castellon for the last 3 weeks for urinary retention. Reports cough with no sputum production. Denies chest pain, fever, nausea/vomiting, diarrhea. CC- Ischemic Cardiomyopathy, NSTEMI, Severe CAD with LM/TVD and strong fam history of CAD.    On 10/7 s/p CABG x3, EF 20-25% and left atrial appendage clipping  Returned to ED 10/31 - with SOB in setting of leg pain (no DVT on LE US) and shortness of breath (s/p Right thoracentesis of effusion)    Vital Signs Last 24 Hrs  T(C): 36.4 (10 Nov 2019 07:00), Max: 36.4 (10 Nov 2019 04:00)  T(F): 97.5 (10 Nov 2019 07:00), Max: 97.6 (10 Nov 2019 04:00)  HR: 90 (10 Nov 2019 10:00) (81 - 93)  BP: 110/68 (10 Nov 2019 10:00) (93/51 - 118/57)  BP(mean): 78 (10 Nov 2019 10:00) (67 - 83)  RR: 15 (10 Nov 2019 10:00) (12 - 28)  SpO2: 98% (10 Nov 2019 10:00) (97% - 100%)    alert, awake, verbal  Dry mucosal membranes  follows commands appropriately  Clean sternal incision  right pig tail cath (400 ml of serous output overnight)  S1S2 reg rate  diminished b/l air entry  soft abdomen, non tender, non distended, ecchymosis in belt like distribution  warm periphery with + distal pulses, b/l 2+ pitting edema    WBC 6.6, Hg 9.3, plt 183  K 5.2, , Cr 1.9  PreAlb 17 on 11/7, Alb 3.3    CXR - rotated and poor quality. ? left effusion.     a/p:    Acute on Chr. systolic HF (decompensated HF)  CAD s/p CABG with poor EF (20-25%)  pleural effusion  poor nutritional status    PA/Lat CXR today  Hold diuresis today  25% albumin 100 ml x1 for now, trend bun/cr.  Cont with Primacor 0.25 gtt  PO ASA and Coreg  check repeat proBNP (50172->11,884)  Start PO megace and nutritional supplements  OOB to chair  Removal of Pig tail as per CT surgery  discussed on rounds today      45 minutes of critical care time spent providing medical care for patient's acute illness/conditions that impairs at least one vital organ system and/or poses a high risk of imminent or life threatening deterioration in the patient's condition. It includes time spent evaluating and treating the patient's acute illness as well as time spent reviewing labs, radiology, discussing goals of care with patient and/or patient's family, and discussing the case with a multidisciplinary team in an effort to prevent further life threatening deterioration or end organ damage. This time is independent of any procedures performed.

## 2019-11-11 LAB
ALBUMIN SERPL ELPH-MCNC: 3.4 G/DL — LOW (ref 3.5–5.2)
ALP SERPL-CCNC: 72 U/L — SIGNIFICANT CHANGE UP (ref 30–115)
ALT FLD-CCNC: 9 U/L — SIGNIFICANT CHANGE UP (ref 0–41)
ANION GAP SERPL CALC-SCNC: 12 MMOL/L — SIGNIFICANT CHANGE UP (ref 7–14)
ANION GAP SERPL CALC-SCNC: 12 MMOL/L — SIGNIFICANT CHANGE UP (ref 7–14)
AST SERPL-CCNC: 10 U/L — SIGNIFICANT CHANGE UP (ref 0–41)
BILIRUB SERPL-MCNC: 0.2 MG/DL — SIGNIFICANT CHANGE UP (ref 0.2–1.2)
BUN SERPL-MCNC: 86 MG/DL — CRITICAL HIGH (ref 10–20)
BUN SERPL-MCNC: 94 MG/DL — CRITICAL HIGH (ref 10–20)
CALCIUM SERPL-MCNC: 9.5 MG/DL — SIGNIFICANT CHANGE UP (ref 8.5–10.1)
CALCIUM SERPL-MCNC: 9.6 MG/DL — SIGNIFICANT CHANGE UP (ref 8.5–10.1)
CHLORIDE SERPL-SCNC: 107 MMOL/L — SIGNIFICANT CHANGE UP (ref 98–110)
CHLORIDE SERPL-SCNC: 108 MMOL/L — SIGNIFICANT CHANGE UP (ref 98–110)
CO2 SERPL-SCNC: 24 MMOL/L — SIGNIFICANT CHANGE UP (ref 17–32)
CO2 SERPL-SCNC: 25 MMOL/L — SIGNIFICANT CHANGE UP (ref 17–32)
CREAT SERPL-MCNC: 1.4 MG/DL — SIGNIFICANT CHANGE UP (ref 0.7–1.5)
CREAT SERPL-MCNC: 1.5 MG/DL — SIGNIFICANT CHANGE UP (ref 0.7–1.5)
CULTURE RESULTS: SIGNIFICANT CHANGE UP
GLUCOSE BLDC GLUCOMTR-MCNC: 111 MG/DL — HIGH (ref 70–99)
GLUCOSE BLDC GLUCOMTR-MCNC: 112 MG/DL — HIGH (ref 70–99)
GLUCOSE BLDC GLUCOMTR-MCNC: 81 MG/DL — SIGNIFICANT CHANGE UP (ref 70–99)
GLUCOSE BLDC GLUCOMTR-MCNC: 87 MG/DL — SIGNIFICANT CHANGE UP (ref 70–99)
GLUCOSE SERPL-MCNC: 103 MG/DL — HIGH (ref 70–99)
GLUCOSE SERPL-MCNC: 115 MG/DL — HIGH (ref 70–99)
HCT VFR BLD CALC: 29.2 % — LOW (ref 37–47)
HGB BLD-MCNC: 8.9 G/DL — LOW (ref 12–16)
MCHC RBC-ENTMCNC: 27.8 PG — SIGNIFICANT CHANGE UP (ref 27–31)
MCHC RBC-ENTMCNC: 30.5 G/DL — LOW (ref 32–37)
MCV RBC AUTO: 91.3 FL — SIGNIFICANT CHANGE UP (ref 81–99)
NRBC # BLD: 0 /100 WBCS — SIGNIFICANT CHANGE UP (ref 0–0)
PLATELET # BLD AUTO: 160 K/UL — SIGNIFICANT CHANGE UP (ref 130–400)
POTASSIUM SERPL-MCNC: 4.4 MMOL/L — SIGNIFICANT CHANGE UP (ref 3.5–5)
POTASSIUM SERPL-MCNC: 5.5 MMOL/L — HIGH (ref 3.5–5)
POTASSIUM SERPL-SCNC: 4.4 MMOL/L — SIGNIFICANT CHANGE UP (ref 3.5–5)
POTASSIUM SERPL-SCNC: 5.5 MMOL/L — HIGH (ref 3.5–5)
PROT SERPL-MCNC: 5.1 G/DL — LOW (ref 6–8)
RBC # BLD: 3.2 M/UL — LOW (ref 4.2–5.4)
RBC # FLD: 16.3 % — HIGH (ref 11.5–14.5)
SODIUM SERPL-SCNC: 144 MMOL/L — SIGNIFICANT CHANGE UP (ref 135–146)
SODIUM SERPL-SCNC: 144 MMOL/L — SIGNIFICANT CHANGE UP (ref 135–146)
SPECIMEN SOURCE: SIGNIFICANT CHANGE UP
WBC # BLD: 5.15 K/UL — SIGNIFICANT CHANGE UP (ref 4.8–10.8)
WBC # FLD AUTO: 5.15 K/UL — SIGNIFICANT CHANGE UP (ref 4.8–10.8)

## 2019-11-11 PROCEDURE — 99232 SBSQ HOSP IP/OBS MODERATE 35: CPT

## 2019-11-11 PROCEDURE — 93010 ELECTROCARDIOGRAM REPORT: CPT

## 2019-11-11 PROCEDURE — 71045 X-RAY EXAM CHEST 1 VIEW: CPT | Mod: 26

## 2019-11-11 PROCEDURE — 99024 POSTOP FOLLOW-UP VISIT: CPT

## 2019-11-11 RX ORDER — SODIUM POLYSTYRENE SULFONATE 4.1 MEQ/G
30 POWDER, FOR SUSPENSION ORAL ONCE
Refills: 0 | Status: COMPLETED | OUTPATIENT
Start: 2019-11-11 | End: 2019-11-11

## 2019-11-11 RX ORDER — ISOSORBIDE DINITRATE 5 MG/1
10 TABLET ORAL THREE TIMES A DAY
Refills: 0 | Status: DISCONTINUED | OUTPATIENT
Start: 2019-11-11 | End: 2019-11-12

## 2019-11-11 RX ORDER — ISOSORBIDE DINITRATE 5 MG/1
10 TABLET ORAL ONCE
Refills: 0 | Status: COMPLETED | OUTPATIENT
Start: 2019-11-11 | End: 2019-11-11

## 2019-11-11 RX ORDER — ALBUMIN HUMAN 25 %
100 VIAL (ML) INTRAVENOUS ONCE
Refills: 0 | Status: COMPLETED | OUTPATIENT
Start: 2019-11-11 | End: 2019-11-11

## 2019-11-11 RX ORDER — MILRINONE LACTATE 1 MG/ML
0.18 INJECTION, SOLUTION INTRAVENOUS
Qty: 20 | Refills: 0 | Status: DISCONTINUED | OUTPATIENT
Start: 2019-11-11 | End: 2019-11-12

## 2019-11-11 RX ORDER — HYDRALAZINE HCL 50 MG
20 TABLET ORAL EVERY 8 HOURS
Refills: 0 | Status: DISCONTINUED | OUTPATIENT
Start: 2019-11-11 | End: 2019-11-13

## 2019-11-11 RX ADMIN — ISOSORBIDE DINITRATE 10 MILLIGRAM(S): 5 TABLET ORAL at 19:23

## 2019-11-11 RX ADMIN — CARVEDILOL PHOSPHATE 6.25 MILLIGRAM(S): 80 CAPSULE, EXTENDED RELEASE ORAL at 18:34

## 2019-11-11 RX ADMIN — Medication 500 MILLIGRAM(S): at 11:53

## 2019-11-11 RX ADMIN — Medication 50 MILLILITER(S): at 10:41

## 2019-11-11 RX ADMIN — METFORMIN HYDROCHLORIDE 1000 MILLIGRAM(S): 850 TABLET ORAL at 18:34

## 2019-11-11 RX ADMIN — HEPARIN SODIUM 5000 UNIT(S): 5000 INJECTION INTRAVENOUS; SUBCUTANEOUS at 13:51

## 2019-11-11 RX ADMIN — HEPARIN SODIUM 5000 UNIT(S): 5000 INJECTION INTRAVENOUS; SUBCUTANEOUS at 06:14

## 2019-11-11 RX ADMIN — SERTRALINE 25 MILLIGRAM(S): 25 TABLET, FILM COATED ORAL at 11:54

## 2019-11-11 RX ADMIN — Medication 20 MILLIGRAM(S): at 21:25

## 2019-11-11 RX ADMIN — SODIUM CHLORIDE 3 MILLILITER(S): 9 INJECTION INTRAMUSCULAR; INTRAVENOUS; SUBCUTANEOUS at 05:55

## 2019-11-11 RX ADMIN — CARVEDILOL PHOSPHATE 6.25 MILLIGRAM(S): 80 CAPSULE, EXTENDED RELEASE ORAL at 06:14

## 2019-11-11 RX ADMIN — INSULIN GLARGINE 20 UNIT(S): 100 INJECTION, SOLUTION SUBCUTANEOUS at 10:22

## 2019-11-11 RX ADMIN — SENNA PLUS 1 TABLET(S): 8.6 TABLET ORAL at 06:14

## 2019-11-11 RX ADMIN — SODIUM POLYSTYRENE SULFONATE 30 GRAM(S): 4.1 POWDER, FOR SUSPENSION ORAL at 06:52

## 2019-11-11 RX ADMIN — ISOSORBIDE DINITRATE 10 MILLIGRAM(S): 5 TABLET ORAL at 21:25

## 2019-11-11 RX ADMIN — SODIUM CHLORIDE 3 MILLILITER(S): 9 INJECTION INTRAMUSCULAR; INTRAVENOUS; SUBCUTANEOUS at 13:51

## 2019-11-11 RX ADMIN — OXYCODONE HYDROCHLORIDE 5 MILLIGRAM(S): 5 TABLET ORAL at 22:41

## 2019-11-11 RX ADMIN — METFORMIN HYDROCHLORIDE 1000 MILLIGRAM(S): 850 TABLET ORAL at 06:14

## 2019-11-11 RX ADMIN — Medication 3 MILLILITER(S): at 09:10

## 2019-11-11 RX ADMIN — PANTOPRAZOLE SODIUM 40 MILLIGRAM(S): 20 TABLET, DELAYED RELEASE ORAL at 06:14

## 2019-11-11 RX ADMIN — MEGESTROL ACETATE 800 MILLIGRAM(S): 40 SUSPENSION ORAL at 11:53

## 2019-11-11 RX ADMIN — Medication 81 MILLIGRAM(S): at 11:53

## 2019-11-11 RX ADMIN — OXYCODONE HYDROCHLORIDE 5 MILLIGRAM(S): 5 TABLET ORAL at 23:11

## 2019-11-11 RX ADMIN — SODIUM CHLORIDE 3 MILLILITER(S): 9 INJECTION INTRAMUSCULAR; INTRAVENOUS; SUBCUTANEOUS at 21:27

## 2019-11-11 RX ADMIN — HEPARIN SODIUM 5000 UNIT(S): 5000 INJECTION INTRAVENOUS; SUBCUTANEOUS at 21:24

## 2019-11-11 RX ADMIN — ZINC SULFATE TAB 220 MG (50 MG ZINC EQUIVALENT) 220 MILLIGRAM(S): 220 (50 ZN) TAB at 11:54

## 2019-11-11 RX ADMIN — ATORVASTATIN CALCIUM 40 MILLIGRAM(S): 80 TABLET, FILM COATED ORAL at 21:25

## 2019-11-11 NOTE — PROGRESS NOTE ADULT - PROBLEM SELECTOR PLAN 1
ICM s/p CABG   Started on milrinone last week. Today BP 130s with HR 80s  Patient remains fluid overloaded  Start furosemide 40 mg bid ivp  Restart hydralazine 20 mg tid with Isordil 10 mg TID (uptitrate tomorrow after stopping milrinone)  Carvedilol 6.25 mg BID  S/p pigtail catheter (still draining)   Daily weights   Low salt diet   PT eval   Patient will likely benefit from going to rehab upon discharge  Incentive inspirometry  Consider adding glucerna as supplement   Discussed with CT surgery and patient

## 2019-11-11 NOTE — PROGRESS NOTE ADULT - SUBJECTIVE AND OBJECTIVE BOX
GUS WILBURN  68y, Female    All available historical data reviewed    OVERNIGHT EVENTS:  none  ROS:  General: Denies rigors, nightsweats  HEENT: Denies headache, rhinorrhea, sore throat, eye pain  CV: Denies CP, palpitations  PULM: Denies wheezing, hemoptysis, has SOB, no cough  GI: Denies hematemesis, hematochezia, melena  : Denies discharge, hematuria  MSK: Denies arthralgias, myalgias  SKIN: Denies rash, lesions  NEURO: Denies paresthesias, weakness  PSYCH: Denies depression, anxiety    VITALS:  T(F): 98.2, Max: 98.6 (11-11-19 @ 00:00)  HR: 84  BP: 139/63  RR: 18Vital Signs Last 24 Hrs  T(C): 36.8 (11 Nov 2019 04:00), Max: 37 (11 Nov 2019 00:00)  T(F): 98.2 (11 Nov 2019 04:00), Max: 98.6 (11 Nov 2019 00:00)  HR: 84 (11 Nov 2019 06:00) (83 - 91)  BP: 139/63 (11 Nov 2019 06:00) (104/51 - 139/63)  BP(mean): 90 (11 Nov 2019 06:00) (61 - 90)  RR: 18 (11 Nov 2019 06:00) (12 - 40)  SpO2: 99% (11 Nov 2019 06:00) (83% - 100%)    TESTS & MEASUREMENTS:                        8.9    5.15  )-----------( 160      ( 11 Nov 2019 01:00 )             29.2     11-11    144  |  108  |  94<HH>  ----------------------------<  103<H>  5.5<H>   |  24  |  1.5    Ca    9.5      11 Nov 2019 01:00  Mg     2.0     11-10    TPro  5.1<L>  /  Alb  3.4<L>  /  TBili  0.2  /  DBili  x   /  AST  10  /  ALT  9   /  AlkPhos  72  11-11    LIVER FUNCTIONS - ( 11 Nov 2019 01:00 )  Alb: 3.4 g/dL / Pro: 5.1 g/dL / ALK PHOS: 72 U/L / ALT: 9 U/L / AST: 10 U/L / GGT: x             Culture - Body Fluid with Gram Stain (collected 11-06-19 @ 09:10)  Source: .Body Fluid Pleural fluid  Gram Stain (11-07-19 @ 05:50):    polymorphonuclear leukocytes seen    No organisms seen    by cytocentrifuge  Preliminary Report (11-07-19 @ 18:30):    No growth    Culture - Sputum (collected 11-05-19 @ 09:25)  Source: .Sputum Sputum  Gram Stain (11-06-19 @ 13:53):    Rare polymorphonuclear leukocytes per low power field    Rare Squamous epithelial cells per low power field    Few Yeast like cells  Final Report (11-08-19 @ 13:04):    Normal Respiratory Ewa present            RADIOLOGY & ADDITIONAL TESTS:  Personal review of radiological diagnostics performed  Echo and EKG results noted when applicable.     ANTIBIOTICS:

## 2019-11-11 NOTE — PROGRESS NOTE ADULT - SUBJECTIVE AND OBJECTIVE BOX
Interval history: Patient feels slightly better today, still with depressed mood. Renal function improved.           HPI:  67 y/o F with h/o CAD s/p CABG, ICM, chronic systolic heart failure with EF ~ 25% coming today with rash in RLE and pain. Patient can't bear any weight. The pain started suddenly. She also notes some SOB but this is not that significant compared to her baseline. She can walk minimally around the house before getting SOB. She denies PND or orthopnea but has pedal edema. No LOC, palpitations, bleeding episodes.       Patient admitted to CTU and started on broad spectrum Abx, started on iv diuretics. She reports feeling slightly better from her RLE pain.     PSH: CABG    Social: No ETOH or smoking

## 2019-11-11 NOTE — PROGRESS NOTE ADULT - ASSESSMENT
· Assessment		  69 y/o F with h/o CAD s/p CABG, ICM, chronic systolic heart failure with EF ~ 25% coming today with rash in RLE and pain. Patient can't bear any weight. The pain started suddenly. She also notes some SOB but this is not that significant compared to her baseline. She can walk minimally around the house before getting SOB. She denies PND or orthopnea but has pedal edema.       PROBLEMS  1. Suspected Gram negative pneumonia    New problem with additional W/U  acute illness with systemic symptoms     CT with dense consolidative opacity in the right lower lobe and right middle lobe,   On cefepime   IVPB 1000 milliGRAM(s) IV Intermittent every 12 hours    Nasal MRSA PCR negative  Urine Legionella Antigen negative  11/2 Cxray with increasing right base opacity and stable left base opacity  Sputum C&S Nl Ewa    2. CT with  right-sided pleural effusion and small left pleural effusion.  S/P thoracentesis    3. Atelectasis on CT    4. Resolving RIC    5. Alkalosis      PLAN  maintain off ABx  recall prn please

## 2019-11-11 NOTE — PROGRESS NOTE ADULT - SUBJECTIVE AND OBJECTIVE BOX
OPERATIVE PROCEDURE(s):                POD #                       68yFemale  SURGEON(s): CHINA Escobar  SUBJECTIVE ASSESSMENT:   Vital Signs Last 24 Hrs  T(F): 98.2 (11 Nov 2019 04:00), Max: 98.6 (11 Nov 2019 00:00)  HR: 82 (11 Nov 2019 07:00) (82 - 91)  BP: 141/66 (11 Nov 2019 07:00) (104/51 - 141/66)  BP(mean): 95 (11 Nov 2019 07:00) (61 - 95)  ABP: --  ABP(mean): --  RR: 18 (11 Nov 2019 07:00) (12 - 40)  SpO2: 99% (11 Nov 2019 07:00) (83% - 100%)  CVP(mm Hg): --  CVP(cm H2O): --  CO: --  CI: --  PA: --  SVR: --    I&O's Detail    10 Nov 2019 07:01  -  11 Nov 2019 07:00  --------------------------------------------------------  IN:    milrinone  Infusion: 100.4 mL    Oral Fluid: 60 mL    sodium chloride 0.9%.: 240 mL  Total IN: 400.4 mL    OUT:    Chest Tube: 720 mL    Indwelling Catheter - Urethral: 1695 mL  Total OUT: 2415 mL        Net:   I&O's Detail    09 Nov 2019 07:01  -  10 Nov 2019 07:00  --------------------------------------------------------  Total NET: 155.4 mL      10 Nov 2019 07:01  -  11 Nov 2019 07:00  --------------------------------------------------------  Total NET: -2014.6 mL        CAPILLARY BLOOD GLUCOSE      POCT Blood Glucose.: 81 mg/dL (11 Nov 2019 06:37)  POCT Blood Glucose.: 115 mg/dL (10 Nov 2019 21:03)  POCT Blood Glucose.: 106 mg/dL (10 Nov 2019 16:27)    Physical Exam:  General: NAD; A&Ox3/Patient is intubated and sedated  Cardiac: S1/S2, RRR, no murmur, no rubs  Lungs: unlabored respirations, CTA b/l, no wheeze, no rales, no crackles  Abdomen: Soft/NT/ND; positive bowel sounds x 4  Sternum: Intact, no click, incision healing well with no drainage  Incisions: Incisions clean/dry/intact  Extremities: No edema b/l lower extremities; good capillary refill; no cyanosis; palpable 1+ pedal pulses b/l    Central Venous Catheter: Yes[]  No[] , If Yes indication:           Day #  [place] Catheter: Yes  [] , No  [] , If yes indication:                      Day #  NGT: Yes [] No [] ,    If Yes Placement:                                     Day #  EPICARDIAL WIRES:  [] YES [] NO                                              Day #  BOWEL MOVEMENT:  [] YES [] NO, If No, Timing since last BM:      Day #  CHEST TUBE(Left/Right):  [] YES [] NO, If yes -  AIR LEAKS:  [] YES [] NO        LABS:                        8.9<L>  5.15  )-----------( 160      ( 11 Nov 2019 01:00 )             29.2<L>                        9.3<L>  6.60  )-----------( 183      ( 10 Nov 2019 02:00 )             30.0<L>    11-11    144  |  108  |  94<HH>  ----------------------------<  103<H>  5.5<H>   |  24  |  1.5  11-10    139  |  105  |  109<HH>  ----------------------------<  78  5.2<H>   |  25  |  1.9<H>    Ca    9.5      11 Nov 2019 01:00  Mg     2.0     11-10    TPro  5.1<L> [6.0 - 8.0]  /  Alb  3.4<L> [3.5 - 5.2]  /  TBili  0.2 [0.2 - 1.2]  /  DBili  x   /  AST  10 [0 - 41]  /  ALT  9 [0 - 41]  /  AlkPhos  72 [30 - 115]  11-11          RADIOLOGY & ADDITIONAL TESTS:  CXR:  EKG:  MEDICATIONS  (STANDING):  albuterol/ipratropium for Nebulization 3 milliLiter(s) Nebulizer every 6 hours  ascorbic acid 500 milliGRAM(s) Oral daily  aspirin  chewable 81 milliGRAM(s) Oral daily  atorvastatin 40 milliGRAM(s) Oral at bedtime  carvedilol 6.25 milliGRAM(s) Oral every 12 hours  dextrose 5%. 1000 milliLiter(s) (50 mL/Hr) IV Continuous <Continuous>  dextrose 50% Injectable 12.5 Gram(s) IV Push once  dextrose 50% Injectable 25 Gram(s) IV Push once  dextrose 50% Injectable 25 Gram(s) IV Push once  heparin  Injectable 5000 Unit(s) SubCutaneous every 8 hours  insulin glargine Injectable (LANTUS) 20 Unit(s) SubCutaneous every morning  insulin lispro (HumaLOG) corrective regimen sliding scale   SubCutaneous three times a day before meals  megestrol Suspension 800 milliGRAM(s) Oral daily  metFORMIN 1000 milliGRAM(s) Oral every 12 hours  milrinone Infusion 0.25 MICROgram(s)/kG/Min (4.185 mL/Hr) IV Continuous <Continuous>  pantoprazole    Tablet 40 milliGRAM(s) Oral before breakfast  polyethylene glycol 3350 17 Gram(s) Oral every 24 hours  senna 1 Tablet(s) Oral every 12 hours  sertraline 25 milliGRAM(s) Oral daily  sodium chloride 0.9% lock flush 3 milliLiter(s) IV Push every 8 hours  sodium chloride 0.9%. 1000 milliLiter(s) (10 mL/Hr) IV Continuous <Continuous>  tiotropium 18 MICROgram(s) Capsule 1 Capsule(s) Inhalation daily  zinc sulfate 220 milliGRAM(s) Oral daily    MEDICATIONS  (PRN):  acetaminophen   Tablet .. 650 milliGRAM(s) Oral every 6 hours PRN Mild Pain (1 - 3)  dextrose 40% Gel 15 Gram(s) Oral once PRN Blood Glucose LESS THAN 70 milliGRAM(s)/deciliter  glucagon  Injectable 1 milliGRAM(s) IntraMuscular once PRN Glucose LESS THAN 70 milligrams/deciliter  oxyCODONE    IR 5 milliGRAM(s) Oral at bedtime PRN Mild Pain (1 - 3)    HEPARIN:  [] YES [] NO  Dose: XX UNITS/HR UNITS Q8H  LOVENOX:[] YES [] NO  Dose: XX mg Q24H  COUMADIN: []  YES [] NO  Dose: XX mg  Q24H  SCD's: YES b/l  GI Prophylaxis: Protonix [], Pepcid [], None [], (Contra-indication:.....)    Post-Op Beta-Blockers: Yes [], No[], If No, then contraindication:  Post-Op Aspirin: Yes [],  No [], If No, then contraindication:  Post-Op Statin: Yes [], No[], If No, then contraindication:  Allergies    No Known Allergies    Intolerances      Ambulation/Activity Status:    Assessment/Plan:  68y Female status-post .....  - Case and plan discussed with CTU Intensivist and CT Surgeon - Dr. Urbano/Flaco/Shawn   - Continue CTU supportive care    - Continue DVT/GI prophylaxis  - Incentive Spirometry 10 times an hour  - Continue to advance physical activity as tolerated and continue PT/OT as directed  1. CAD: Continue ASA, statin, BB  2. HTN:   3. A. Fib:   4. COPD/Hypoxia:   5. DM/Glucose Control:     Social Service Disposition: SURGEON(s): CHINA Escobar  SUBJECTIVE ASSESSMENT:   Vital Signs Last 24 Hrs  T(F): 98.2 (11 Nov 2019 04:00), Max: 98.6 (11 Nov 2019 00:00)  HR: 82 (11 Nov 2019 07:00) (82 - 91)  BP: 141/66 (11 Nov 2019 07:00) (104/51 - 141/66)  BP(mean): 95 (11 Nov 2019 07:00) (61 - 95)  RR: 18 (11 Nov 2019 07:00) (12 - 40)  SpO2: 99% (11 Nov 2019 07:00) (83% - 100%)    I&O's Detail    10 Nov 2019 07:01  -  11 Nov 2019 07:00  --------------------------------------------------------  IN:    milrinone  Infusion: 100.4 mL    Oral Fluid: 60 mL    sodium chloride 0.9%.: 240 mL  Total IN: 400.4 mL    OUT:    Chest Tube: 720 mL    Indwelling Catheter - Urethral: 1695 mL  Total OUT: 2415 mL      Net:   I&O's Detail    09 Nov 2019 07:01  -  10 Nov 2019 07:00  --------------------------------------------------------  Total NET: 155.4 mL      10 Nov 2019 07:01  -  11 Nov 2019 07:00  --------------------------------------------------------  Total NET: -2014.6 mL    CAPILLARY BLOOD GLUCOSE  POCT Blood Glucose.: 81 mg/dL (11 Nov 2019 06:37)  POCT Blood Glucose.: 115 mg/dL (10 Nov 2019 21:03)  POCT Blood Glucose.: 106 mg/dL (10 Nov 2019 16:27)    Physical Exam:  General: NAD; A&Ox3/Patient is intubated and sedated  Cardiac: S1/S2, RRR, no murmur, no rubs  Lungs: unlabored respirations, CTA b/l, no wheeze, no rales, no crackles  Abdomen: Soft/NT/ND; positive bowel sounds x 4  Sternum: Intact, no click, incision healing well with no drainage  Incisions: Incisions clean/dry/intact  Extremities: No edema b/l lower extremities; good capillary refill; no cyanosis; palpable 1+ pedal pulses b/l    Central Venous Catheter: Yes[]  No[] , If Yes indication:           Day #  [place] Catheter: Yes  [] , No  [] , If yes indication:                      Day #  NGT: Yes [] No [] ,    If Yes Placement:                                     Day #  EPICARDIAL WIRES:  [] YES [] NO                                              Day #  BOWEL MOVEMENT:  [] YES [] NO, If No, Timing since last BM:      Day #  CHEST TUBE(Left/Right):  [] YES [] NO, If yes -  AIR LEAKS:  [] YES [] NO        LABS:                        8.9<L>  5.15  )-----------( 160      ( 11 Nov 2019 01:00 )             29.2<L>                        9.3<L>  6.60  )-----------( 183      ( 10 Nov 2019 02:00 )             30.0<L>    11-11    144  |  108  |  94<HH>  ----------------------------<  103<H>  5.5<H>   |  24  |  1.5  11-10    139  |  105  |  109<HH>  ----------------------------<  78  5.2<H>   |  25  |  1.9<H>    Ca    9.5      11 Nov 2019 01:00  Mg     2.0     11-10    TPro  5.1<L> [6.0 - 8.0]  /  Alb  3.4<L> [3.5 - 5.2]  /  TBili  0.2 [0.2 - 1.2]  /  DBili  x   /  AST  10 [0 - 41]  /  ALT  9 [0 - 41]  /  AlkPhos  72 [30 - 115]  11-11    MEDICATIONS  (STANDING):  albuterol/ipratropium for Nebulization 3 milliLiter(s) Nebulizer every 6 hours  ascorbic acid 500 milliGRAM(s) Oral daily  aspirin  chewable 81 milliGRAM(s) Oral daily  atorvastatin 40 milliGRAM(s) Oral at bedtime  carvedilol 6.25 milliGRAM(s) Oral every 12 hours  heparin  Injectable 5000 Unit(s) SubCutaneous every 8 hours  insulin glargine Injectable (LANTUS) 20 Unit(s) SubCutaneous every morning  insulin lispro (HumaLOG) corrective regimen sliding scale   SubCutaneous three times a day before meals  megestrol Suspension 800 milliGRAM(s) Oral daily  metFORMIN 1000 milliGRAM(s) Oral every 12 hours  milrinone Infusion 0.25 MICROgram(s)/kG/Min (4.185 mL/Hr) IV Continuous <Continuous>  pantoprazole    Tablet 40 milliGRAM(s) Oral before breakfast  senna 1 Tablet(s) Oral every 12 hours  sertraline 25 milliGRAM(s) Oral daily  tiotropium 18 MICROgram(s) Capsule 1 Capsule(s) Inhalation daily  zinc sulfate 220 milliGRAM(s) Oral daily    MEDICATIONS  (PRN):  acetaminophen   Tablet .. 650 milliGRAM(s) Oral every 6 hours PRN Mild Pain (1 - 3)  dextrose 40% Gel 15 Gram(s) Oral once PRN Blood Glucose LESS THAN 70 milliGRAM(s)/deciliter  glucagon  Injectable 1 milliGRAM(s) IntraMuscular once PRN Glucose LESS THAN 70 milligrams/deciliter  oxyCODONE    IR 5 milliGRAM(s) Oral at bedtime PRN Mild Pain (1 - 3)    HEPARIN:  [] YES [] NO  Dose: XX UNITS/HR UNITS Q8H  LOVENOX:[] YES [] NO  Dose: XX mg Q24H  COUMADIN: []  YES [] NO  Dose: XX mg  Q24H  SCD's: YES b/l  GI Prophylaxis: Protonix [], Pepcid [], None [], (Contra-indication:.....)    Post-Op Beta-Blockers: Yes [], No[], If No, then contraindication:  Post-Op Aspirin: Yes [],  No [], If No, then contraindication:  Post-Op Statin: Yes [], No[], If No, then contraindication:  Allergies    No Known Allergies    Intolerances      Ambulation/Activity Status:    Assessment/Plan:  68y Female status-post .....  - Case and plan discussed with CTU Intensivist and CT Surgeon - Dr. Urbano/Flaco/Shawn   - Continue CTU supportive care    - Continue DVT/GI prophylaxis  - Incentive Spirometry 10 times an hour  - Continue to advance physical activity as tolerated and continue PT/OT as directed  1. CAD: Continue ASA, statin, BB  2. HTN:   3. A. Fib:   4. COPD/Hypoxia:   5. DM/Glucose Control:     Social Service Disposition: SURGEON(s): CHINA Escobar  SUBJECTIVE ASSESSMENT: pt seen and examined. pt states she is still sob. she also has poor appetite  Vital Signs Last 24 Hrs  T(F): 98.2 (11 Nov 2019 04:00), Max: 98.6 (11 Nov 2019 00:00)  HR: 82 (11 Nov 2019 07:00) (82 - 91)  BP: 141/66 (11 Nov 2019 07:00) (104/51 - 141/66)  BP(mean): 95 (11 Nov 2019 07:00) (61 - 95)  RR: 18 (11 Nov 2019 07:00) (12 - 40)  SpO2: 99% (11 Nov 2019 07:00) (83% - 100%)    I&O's Detail    10 Nov 2019 07:01  -  11 Nov 2019 07:00  --------------------------------------------------------  IN:    milrinone  Infusion: 100.4 mL    Oral Fluid: 60 mL    sodium chloride 0.9%.: 240 mL  Total IN: 400.4 mL    OUT:    Chest Tube: 720 mL    Indwelling Catheter - Urethral: 1695 mL  Total OUT: 2415 mL      Net:   I&O's Detail    09 Nov 2019 07:01  -  10 Nov 2019 07:00  --------------------------------------------------------  Total NET: 155.4 mL      10 Nov 2019 07:01  -  11 Nov 2019 07:00  --------------------------------------------------------  Total NET: -2014.6 mL    CAPILLARY BLOOD GLUCOSE  POCT Blood Glucose.: 81 mg/dL (11 Nov 2019 06:37)  POCT Blood Glucose.: 115 mg/dL (10 Nov 2019 21:03)  POCT Blood Glucose.: 106 mg/dL (10 Nov 2019 16:27)    Physical Exam:  General: NAD; A&Ox3  Cardiac: S1/S2, RRR, no murmur, no rubs  Lungs: decreased bs at right bases, crackles bilateral  Abdomen: Soft/NT/ND; positive bowel sounds x 4  Sternum: Intact, no click, incision healing well with no drainage  Incisions: Incisions clean/dry/intact  Extremities: 2+ edema b/l lower extremities; good capillary refill; no cyanosis; palpable 1+ pedal pulses b/l    Central Venous Catheter: Yes[]  No[x] , If Yes indication:           Day #  [place] Catheter: Yes  [x] , No  [] , If yes indication:                    prolapse bladder                Day # chronic montejo  NGT: Yes [] No [x] ,    If Yes Placement:                                     Day #  EPICARDIAL WIRES:  [] YES [x] NO                                              Day #  BOWEL MOVEMENT:  [x] YES [] NO, If No, Timing since last BM:      Day #  CHEST TUBE(Left/Right):  [] YES [x] NO, If yes -  AIR LEAKS:  [] YES [] NO        LABS:                        8.9<L>  5.15  )-----------( 160      ( 11 Nov 2019 01:00 )             29.2<L>                        9.3<L>  6.60  )-----------( 183      ( 10 Nov 2019 02:00 )             30.0<L>    11-11    144  |  108  |  94<HH>  ----------------------------<  103<H>  5.5<H>   |  24  |  1.5  11-10    139  |  105  |  109<HH>  ----------------------------<  78  5.2<H>   |  25  |  1.9<H>    Ca    9.5      11 Nov 2019 01:00  Mg     2.0     11-10    TPro  5.1<L> [6.0 - 8.0]  /  Alb  3.4<L> [3.5 - 5.2]  /  TBili  0.2 [0.2 - 1.2]  /  DBili  x   /  AST  10 [0 - 41]  /  ALT  9 [0 - 41]  /  AlkPhos  72 [30 - 115]  11-11    MEDICATIONS  (STANDING):  albuterol/ipratropium for Nebulization 3 milliLiter(s) Nebulizer every 6 hours  ascorbic acid 500 milliGRAM(s) Oral daily  aspirin  chewable 81 milliGRAM(s) Oral daily  atorvastatin 40 milliGRAM(s) Oral at bedtime  carvedilol 6.25 milliGRAM(s) Oral every 12 hours  heparin  Injectable 5000 Unit(s) SubCutaneous every 8 hours  insulin glargine Injectable (LANTUS) 20 Unit(s) SubCutaneous every morning  insulin lispro (HumaLOG) corrective regimen sliding scale   SubCutaneous three times a day before meals  megestrol Suspension 800 milliGRAM(s) Oral daily  metFORMIN 1000 milliGRAM(s) Oral every 12 hours  milrinone Infusion 0.25 MICROgram(s)/kG/Min (4.185 mL/Hr) IV Continuous <Continuous>  pantoprazole    Tablet 40 milliGRAM(s) Oral before breakfast  senna 1 Tablet(s) Oral every 12 hours  sertraline 25 milliGRAM(s) Oral daily  tiotropium 18 MICROgram(s) Capsule 1 Capsule(s) Inhalation daily  zinc sulfate 220 milliGRAM(s) Oral daily    MEDICATIONS  (PRN):  acetaminophen   Tablet .. 650 milliGRAM(s) Oral every 6 hours PRN Mild Pain (1 - 3)  dextrose 40% Gel 15 Gram(s) Oral once PRN Blood Glucose LESS THAN 70 milliGRAM(s)/deciliter  glucagon  Injectable 1 milliGRAM(s) IntraMuscular once PRN Glucose LESS THAN 70 milligrams/deciliter  oxyCODONE    IR 5 milliGRAM(s) Oral at bedtime PRN Mild Pain (1 - 3)    HEPARIN:  [x] YES [] NO  Dose: 5000 UNITS Q8H  LOVENOX:[] YES [x] NO  Dose: XX mg Q24H  COUMADIN: []  YES [x] NO  Dose: XX mg  Q24H  SCD's: YES b/l  GI Prophylaxis: Protonix [x], Pepcid [], None [], (Contra-indication:.....)    Post-Op Beta-Blockers: Yes [x], No[], If No, then contraindication:  Post-Op Aspirin: Yes [x],  No [], If No, then contraindication:  Post-Op Statin: Yes [x], No[], If No, then contraindication:  Allergies    No Known Allergies    Intolerances      Ambulation/Activity Status: ambulate     Assessment/Plan:  68y Female status-post 68y Female status-post CABGx3 on 10/7/2019 readmit with right pleural effusion, most likely secondary to heart failure  - Case and plan discussed with CTU Intensivist and CT Surgeon - Dr. Urbano/Flaco/Shawn   - Continue CTU supportive care    - Continue DVT/GI prophylaxis  - Incentive Spirometry 10 times an hour  - Continue to advance physical activity as tolerated and continue PT/OT as directed  1. CAD: Continue ASA, statin, BB  2. chronic systolic heart failure- hold diuretics for now for RIC, cont coreg, start hydralazine and isordil as per Dr. Gandara. keep right pigtail in place  3. COPD/Hypoxia: nebs, wean o2 as tolerated  4. poor appetite: cont megace, calorie count  5. DM/Glucose Control: cont lantus 20 humalog sliding scale  6. hyperkalemia: give Kayexalate and repeat bmp later today    Social Service Disposition:  home

## 2019-11-11 NOTE — PROGRESS NOTE ADULT - ASSESSMENT
ASSESSMENT/PLAN  - 67 yo female admitted with increasing SOB and RLE pain  - + cellulitis and RLL pneumonia  - s/p thoracentesis for pleural effusion  - DM - improved glycemic control  - + severe protein calorie malnutrition  - + sacral/ perineal skin breakdown    continue with current nutrition and p.o supplement   check bmp/phos/ mg and correct lytes  katelyn.count x 3 days  spoke with CTU team

## 2019-11-11 NOTE — PROGRESS NOTE ADULT - SUBJECTIVE AND OBJECTIVE BOX
Patient is a 68y old  Female who presents with a chief complaint of Shortness of breath, leg pain (11 Nov 2019 07:50)  pt seen and evaluated OOB to chair   on p.o diet poorly eating    ICU Vital Signs Last 24 Hrs  T(C): 36.1 (11 Nov 2019 08:00), Max: 37 (11 Nov 2019 00:00)  T(F): 97 (11 Nov 2019 08:00), Max: 98.6 (11 Nov 2019 00:00)  HR: 85 (11 Nov 2019 13:00) (82 - 91)  BP: 129/63 (11 Nov 2019 13:00) (104/51 - 141/66)  BP(mean): 90 (11 Nov 2019 13:00) (74 - 95)  RR: 29 (11 Nov 2019 13:00) (18 - 40)  SpO2: 94% (11 Nov 2019 13:00) (83% - 100%)      Drug Dosing Weight  Height (cm): 160.02 (10 Nov 2019 10:44)  Weight (kg): 55.8 (31 Oct 2019 19:31)  BMI (kg/m2): 21.8 (10 Nov 2019 10:44)  BSA (m2): 1.57 (10 Nov 2019 10:44)    I&O's Detail    10 Nov 2019 07:01  -  11 Nov 2019 07:00  --------------------------------------------------------  IN:    milrinone  Infusion: 100.4 mL    Oral Fluid: 60 mL    sodium chloride 0.9%.: 240 mL  Total IN: 400.4 mL  OUT:    Chest Tube: 720 mL    Indwelling Catheter - Urethral: 1695 mL  Total OUT: 2415 mL    Total NET: -2014.6 mL      11 Nov 2019 07:01  -  11 Nov 2019 15:18  --------------------------------------------------------  IN:    IV PiggyBack: 100 mL    milrinone  Infusion: 8.4 mL    milrinone  Infusion: 21 mL    Oral Fluid: 600 mL    sodium chloride 0.9%.: 90 mL  Total IN: 819.4 mL    OUT:    Chest Tube: 180 mL    Indwelling Catheter - Urethral: 405 mL  Total OUT: 585 mL    Total NET: 234.4 mL    PHYSICAL EXAM:  Constitutional: a+oX3 NAD  chest +chest tube in place  Gastrointestinal: SOFT N/T N/D  MEDICATIONS  (STANDING):  albuterol/ipratropium for Nebulization 3 milliLiter(s) Nebulizer every 6 hours  ascorbic acid 500 milliGRAM(s) Oral daily  aspirin  chewable 81 milliGRAM(s) Oral daily  atorvastatin 40 milliGRAM(s) Oral at bedtime  carvedilol 6.25 milliGRAM(s) Oral every 12 hours  dextrose 5%. 1000 milliLiter(s) (50 mL/Hr) IV Continuous <Continuous>  dextrose 50% Injectable 12.5 Gram(s) IV Push once  dextrose 50% Injectable 25 Gram(s) IV Push once  dextrose 50% Injectable 25 Gram(s) IV Push once  heparin  Injectable 5000 Unit(s) SubCutaneous every 8 hours  hydrALAZINE 20 milliGRAM(s) Oral every 8 hours  insulin glargine Injectable (LANTUS) 20 Unit(s) SubCutaneous every morning  insulin lispro (HumaLOG) corrective regimen sliding scale   SubCutaneous three times a day before meals  isosorbide   dinitrate Tablet (ISORDIL) 10 milliGRAM(s) Oral three times a day  megestrol Suspension 800 milliGRAM(s) Oral daily  metFORMIN 1000 milliGRAM(s) Oral every 12 hours  milrinone Infusion 0.18 MICROgram(s)/kG/Min (3.013 mL/Hr) IV Continuous <Continuous>  pantoprazole    Tablet 40 milliGRAM(s) Oral before breakfast  polyethylene glycol 3350 17 Gram(s) Oral every 24 hours  senna 1 Tablet(s) Oral every 12 hours  sertraline 25 milliGRAM(s) Oral daily  sodium chloride 0.9% lock flush 3 milliLiter(s) IV Push every 8 hours  sodium chloride 0.9%. 1000 milliLiter(s) (10 mL/Hr) IV Continuous <Continuous>  tiotropium 18 MICROgram(s) Capsule 1 Capsule(s) Inhalation daily  zinc sulfate 220 milliGRAM(s) Oral daily      Diet, Consistent Carbohydrate/No Snacks:   Chris(7 Gm Arginine/7 Gm Glut/1.2 Gm HMB     Qty per Day:  2  Beneprotein ([facility] Only)     Qty per Day:  2  Supplement Feeding Modality:  Oral  Glucerna Shake Cans or Servings Per Day:  1       Frequency:  Two Times a day (11-07-19 @ 10:21)      LABS  11-11    144  |  108  |  94<HH>  ----------------------------<  103<H>  5.5<H>   |  24  |  1.5    Ca    9.5      11 Nov 2019 01:00  Mg     2.0     11-10    TPro  5.1<L>  /  Alb  3.4<L>  /  TBili  0.2  /  DBili  x   /  AST  10  /  ALT  9   /  AlkPhos  72  11-11                         8.9    5.15  )-----------( 160      ( 11 Nov 2019 01:00 )             29.2     CAPILLARY BLOOD GLUCOSE  POCT Blood Glucose.: 112 mg/dL (11 Nov 2019 11:46)  POCT Blood Glucose.: 81 mg/dL (11 Nov 2019 06:37)   RADIOLOGY STUDIES  < from: Xray Chest 1 View- PORTABLE-Routine (11.11.19 @ 07:07) >  IMPRESSION:   Increased pulmonary vascular congestion and left pleural effusion/opacity.  Decreased trace right apical pneumothorax.

## 2019-11-11 NOTE — PROGRESS NOTE ADULT - CARDIOVASCULAR COMMENTS
regular heart sounds, 1/6 parasternal systolic murmur
regular heart sounds, parasternal systolic murmur
Regular heart sounds,  1/6 systolic murmur in the apex

## 2019-11-12 LAB
ANION GAP SERPL CALC-SCNC: 14 MMOL/L — SIGNIFICANT CHANGE UP (ref 7–14)
BUN SERPL-MCNC: 73 MG/DL — CRITICAL HIGH (ref 10–20)
CALCIUM SERPL-MCNC: 9.4 MG/DL — SIGNIFICANT CHANGE UP (ref 8.5–10.1)
CHLORIDE SERPL-SCNC: 108 MMOL/L — SIGNIFICANT CHANGE UP (ref 98–110)
CO2 SERPL-SCNC: 22 MMOL/L — SIGNIFICANT CHANGE UP (ref 17–32)
CREAT SERPL-MCNC: 1.3 MG/DL — SIGNIFICANT CHANGE UP (ref 0.7–1.5)
GLUCOSE BLDC GLUCOMTR-MCNC: 102 MG/DL — HIGH (ref 70–99)
GLUCOSE BLDC GLUCOMTR-MCNC: 109 MG/DL — HIGH (ref 70–99)
GLUCOSE BLDC GLUCOMTR-MCNC: 117 MG/DL — HIGH (ref 70–99)
GLUCOSE BLDC GLUCOMTR-MCNC: 119 MG/DL — HIGH (ref 70–99)
GLUCOSE SERPL-MCNC: 90 MG/DL — SIGNIFICANT CHANGE UP (ref 70–99)
HCT VFR BLD CALC: 27.3 % — LOW (ref 37–47)
HGB BLD-MCNC: 8.7 G/DL — LOW (ref 12–16)
MCHC RBC-ENTMCNC: 28.3 PG — SIGNIFICANT CHANGE UP (ref 27–31)
MCHC RBC-ENTMCNC: 31.9 G/DL — LOW (ref 32–37)
MCV RBC AUTO: 88.9 FL — SIGNIFICANT CHANGE UP (ref 81–99)
NRBC # BLD: 0 /100 WBCS — SIGNIFICANT CHANGE UP (ref 0–0)
PLATELET # BLD AUTO: 192 K/UL — SIGNIFICANT CHANGE UP (ref 130–400)
POTASSIUM SERPL-MCNC: 4 MMOL/L — SIGNIFICANT CHANGE UP (ref 3.5–5)
POTASSIUM SERPL-SCNC: 4 MMOL/L — SIGNIFICANT CHANGE UP (ref 3.5–5)
RBC # BLD: 3.07 M/UL — LOW (ref 4.2–5.4)
RBC # FLD: 16.5 % — HIGH (ref 11.5–14.5)
SODIUM SERPL-SCNC: 144 MMOL/L — SIGNIFICANT CHANGE UP (ref 135–146)
WBC # BLD: 5.93 K/UL — SIGNIFICANT CHANGE UP (ref 4.8–10.8)
WBC # FLD AUTO: 5.93 K/UL — SIGNIFICANT CHANGE UP (ref 4.8–10.8)

## 2019-11-12 PROCEDURE — 71045 X-RAY EXAM CHEST 1 VIEW: CPT | Mod: 26

## 2019-11-12 PROCEDURE — 99024 POSTOP FOLLOW-UP VISIT: CPT

## 2019-11-12 RX ORDER — ISOSORBIDE MONONITRATE 60 MG/1
30 TABLET, EXTENDED RELEASE ORAL DAILY
Refills: 0 | Status: DISCONTINUED | OUTPATIENT
Start: 2019-11-12 | End: 2019-11-13

## 2019-11-12 RX ORDER — BUMETANIDE 0.25 MG/ML
1 INJECTION INTRAMUSCULAR; INTRAVENOUS ONCE
Refills: 0 | Status: COMPLETED | OUTPATIENT
Start: 2019-11-12 | End: 2019-11-12

## 2019-11-12 RX ORDER — CARVEDILOL PHOSPHATE 80 MG/1
9.38 CAPSULE, EXTENDED RELEASE ORAL EVERY 12 HOURS
Refills: 0 | Status: DISCONTINUED | OUTPATIENT
Start: 2019-11-12 | End: 2019-11-15

## 2019-11-12 RX ORDER — NYSTATIN CREAM 100000 [USP'U]/G
1 CREAM TOPICAL
Refills: 0 | Status: DISCONTINUED | OUTPATIENT
Start: 2019-11-12 | End: 2019-11-19

## 2019-11-12 RX ADMIN — Medication 500 MILLIGRAM(S): at 11:37

## 2019-11-12 RX ADMIN — BUMETANIDE 1 MILLIGRAM(S): 0.25 INJECTION INTRAMUSCULAR; INTRAVENOUS at 18:15

## 2019-11-12 RX ADMIN — SODIUM CHLORIDE 3 MILLILITER(S): 9 INJECTION INTRAMUSCULAR; INTRAVENOUS; SUBCUTANEOUS at 12:38

## 2019-11-12 RX ADMIN — NYSTATIN CREAM 1 APPLICATION(S): 100000 CREAM TOPICAL at 18:18

## 2019-11-12 RX ADMIN — Medication 81 MILLIGRAM(S): at 11:37

## 2019-11-12 RX ADMIN — MEGESTROL ACETATE 800 MILLIGRAM(S): 40 SUSPENSION ORAL at 11:38

## 2019-11-12 RX ADMIN — SERTRALINE 25 MILLIGRAM(S): 25 TABLET, FILM COATED ORAL at 11:37

## 2019-11-12 RX ADMIN — CARVEDILOL PHOSPHATE 6.25 MILLIGRAM(S): 80 CAPSULE, EXTENDED RELEASE ORAL at 05:16

## 2019-11-12 RX ADMIN — ZINC SULFATE TAB 220 MG (50 MG ZINC EQUIVALENT) 220 MILLIGRAM(S): 220 (50 ZN) TAB at 11:37

## 2019-11-12 RX ADMIN — Medication 20 MILLIGRAM(S): at 05:16

## 2019-11-12 RX ADMIN — METFORMIN HYDROCHLORIDE 1000 MILLIGRAM(S): 850 TABLET ORAL at 05:16

## 2019-11-12 RX ADMIN — ISOSORBIDE DINITRATE 10 MILLIGRAM(S): 5 TABLET ORAL at 05:16

## 2019-11-12 RX ADMIN — ATORVASTATIN CALCIUM 40 MILLIGRAM(S): 80 TABLET, FILM COATED ORAL at 21:53

## 2019-11-12 RX ADMIN — PANTOPRAZOLE SODIUM 40 MILLIGRAM(S): 20 TABLET, DELAYED RELEASE ORAL at 06:06

## 2019-11-12 RX ADMIN — OXYCODONE HYDROCHLORIDE 5 MILLIGRAM(S): 5 TABLET ORAL at 22:25

## 2019-11-12 RX ADMIN — Medication 20 MILLIGRAM(S): at 13:47

## 2019-11-12 RX ADMIN — HEPARIN SODIUM 5000 UNIT(S): 5000 INJECTION INTRAVENOUS; SUBCUTANEOUS at 21:54

## 2019-11-12 RX ADMIN — SODIUM CHLORIDE 3 MILLILITER(S): 9 INJECTION INTRAMUSCULAR; INTRAVENOUS; SUBCUTANEOUS at 05:17

## 2019-11-12 RX ADMIN — HEPARIN SODIUM 5000 UNIT(S): 5000 INJECTION INTRAVENOUS; SUBCUTANEOUS at 13:47

## 2019-11-12 RX ADMIN — INSULIN GLARGINE 20 UNIT(S): 100 INJECTION, SOLUTION SUBCUTANEOUS at 07:39

## 2019-11-12 RX ADMIN — OXYCODONE HYDROCHLORIDE 5 MILLIGRAM(S): 5 TABLET ORAL at 21:55

## 2019-11-12 RX ADMIN — CARVEDILOL PHOSPHATE 9.38 MILLIGRAM(S): 80 CAPSULE, EXTENDED RELEASE ORAL at 18:18

## 2019-11-12 RX ADMIN — ISOSORBIDE DINITRATE 10 MILLIGRAM(S): 5 TABLET ORAL at 13:47

## 2019-11-12 RX ADMIN — HEPARIN SODIUM 5000 UNIT(S): 5000 INJECTION INTRAVENOUS; SUBCUTANEOUS at 05:15

## 2019-11-12 RX ADMIN — Medication 20 MILLIGRAM(S): at 21:53

## 2019-11-12 RX ADMIN — SODIUM CHLORIDE 3 MILLILITER(S): 9 INJECTION INTRAMUSCULAR; INTRAVENOUS; SUBCUTANEOUS at 22:00

## 2019-11-12 RX ADMIN — BUMETANIDE 1 MILLIGRAM(S): 0.25 INJECTION INTRAMUSCULAR; INTRAVENOUS at 12:29

## 2019-11-12 NOTE — PROGRESS NOTE ADULT - SUBJECTIVE AND OBJECTIVE BOX
OPERATIVE PROCEDURE(s):                POD #                       68yFemale  SURGEON(s): CHINA Escobar  SUBJECTIVE ASSESSMENT:   Vital Signs Last 24 Hrs  T(F): 97 (12 Nov 2019 04:00), Max: 97.4 (11 Nov 2019 20:00)  HR: 80 (12 Nov 2019 07:30) (80 - 96)  BP: 137/64 (12 Nov 2019 06:00) (107/55 - 156/67)  BP(mean): 92 (12 Nov 2019 06:00) (73 - 108)  ABP: --  ABP(mean): --  RR: 23 (12 Nov 2019 07:30) (17 - 35)  SpO2: 96% (12 Nov 2019 07:30) (92% - 97%)  CVP(mm Hg): --  CVP(cm H2O): --  CO: --  CI: --  PA: --  SVR: --    I&O's Detail    11 Nov 2019 07:01  -  12 Nov 2019 07:00  --------------------------------------------------------  IN:    IV PiggyBack: 100 mL    milrinone  Infusion: 8.4 mL    milrinone  Infusion: 66 mL    Oral Fluid: 1200 mL    sodium chloride 0.9%.: 240 mL  Total IN: 1614.4 mL    OUT:    Chest Tube: 440 mL    Indwelling Catheter - Urethral: 1505 mL  Total OUT: 1945 mL        Net:   I&O's Detail    10 Nov 2019 07:01  -  11 Nov 2019 07:00  --------------------------------------------------------  Total NET: -2014.6 mL      11 Nov 2019 07:01  -  12 Nov 2019 07:00  --------------------------------------------------------  Total NET: -330.6 mL        CAPILLARY BLOOD GLUCOSE  102 (12 Nov 2019 07:00)      POCT Blood Glucose.: 102 mg/dL (12 Nov 2019 06:43)  POCT Blood Glucose.: 87 mg/dL (11 Nov 2019 21:30)  POCT Blood Glucose.: 111 mg/dL (11 Nov 2019 15:57)  POCT Blood Glucose.: 112 mg/dL (11 Nov 2019 11:46)    Physical Exam:  General: NAD; A&Ox3/Patient is intubated and sedated  Cardiac: S1/S2, RRR, no murmur, no rubs  Lungs: unlabored respirations, CTA b/l, no wheeze, no rales, no crackles  Abdomen: Soft/NT/ND; positive bowel sounds x 4  Sternum: Intact, no click, incision healing well with no drainage  Incisions: Incisions clean/dry/intact  Extremities: No edema b/l lower extremities; good capillary refill; no cyanosis; palpable 1+ pedal pulses b/l    Central Venous Catheter: Yes[]  No[] , If Yes indication:           Day #  Cast[place]on Catheter: Yes  [] , No  [] , If yes indication:                      Day #  NGT: Yes [] No [] ,    If Yes Placement:                                     Day #  EPICARDIAL WIRES:  [] YES [] NO                                              Day #  BOWEL MOVEMENT:  [] YES [] NO, If No, Timing since last BM:      Day #  CHEST TUBE(Left/Right):  [] YES [] NO, If yes -  AIR LEAKS:  [] YES [] NO        LABS:                        8.7<L>  5.93  )-----------( 192      ( 12 Nov 2019 01:20 )             27.3<L>                        8.9<L>  5.15  )-----------( 160      ( 11 Nov 2019 01:00 )             29.2<L>    11-12    144  |  108  |  73<HH>  ----------------------------<  90  4.0   |  22  |  1.3  11-11    144  |  107  |  86<HH>  ----------------------------<  115<H>  4.4   |  25  |  1.4    Ca    9.4      12 Nov 2019 01:20  Mg     2.0     11-10    TPro  5.1<L> [6.0 - 8.0]  /  Alb  3.4<L> [3.5 - 5.2]  /  TBili  0.2 [0.2 - 1.2]  /  DBili  x   /  AST  10 [0 - 41]  /  ALT  9 [0 - 41]  /  AlkPhos  72 [30 - 115]  11-11          RADIOLOGY & ADDITIONAL TESTS:  CXR:  EKG:  MEDICATIONS  (STANDING):  albuterol/ipratropium for Nebulization 3 milliLiter(s) Nebulizer every 6 hours  ascorbic acid 500 milliGRAM(s) Oral daily  aspirin  chewable 81 milliGRAM(s) Oral daily  atorvastatin 40 milliGRAM(s) Oral at bedtime  carvedilol 6.25 milliGRAM(s) Oral every 12 hours  dextrose 5%. 1000 milliLiter(s) (50 mL/Hr) IV Continuous <Continuous>  dextrose 50% Injectable 12.5 Gram(s) IV Push once  dextrose 50% Injectable 25 Gram(s) IV Push once  dextrose 50% Injectable 25 Gram(s) IV Push once  heparin  Injectable 5000 Unit(s) SubCutaneous every 8 hours  hydrALAZINE 20 milliGRAM(s) Oral every 8 hours  insulin glargine Injectable (LANTUS) 20 Unit(s) SubCutaneous every morning  insulin lispro (HumaLOG) corrective regimen sliding scale   SubCutaneous three times a day before meals  isosorbide   dinitrate Tablet (ISORDIL) 10 milliGRAM(s) Oral three times a day  megestrol Suspension 800 milliGRAM(s) Oral daily  metFORMIN 1000 milliGRAM(s) Oral every 12 hours  milrinone Infusion 0.18 MICROgram(s)/kG/Min (3.013 mL/Hr) IV Continuous <Continuous>  pantoprazole    Tablet 40 milliGRAM(s) Oral before breakfast  polyethylene glycol 3350 17 Gram(s) Oral every 24 hours  senna 1 Tablet(s) Oral every 12 hours  sertraline 25 milliGRAM(s) Oral daily  sodium chloride 0.9% lock flush 3 milliLiter(s) IV Push every 8 hours  sodium chloride 0.9%. 1000 milliLiter(s) (10 mL/Hr) IV Continuous <Continuous>  tiotropium 18 MICROgram(s) Capsule 1 Capsule(s) Inhalation daily  zinc sulfate 220 milliGRAM(s) Oral daily    MEDICATIONS  (PRN):  acetaminophen   Tablet .. 650 milliGRAM(s) Oral every 6 hours PRN Mild Pain (1 - 3)  dextrose 40% Gel 15 Gram(s) Oral once PRN Blood Glucose LESS THAN 70 milliGRAM(s)/deciliter  glucagon  Injectable 1 milliGRAM(s) IntraMuscular once PRN Glucose LESS THAN 70 milligrams/deciliter  oxyCODONE    IR 5 milliGRAM(s) Oral at bedtime PRN Mild Pain (1 - 3)    HEPARIN:  [] YES [] NO  Dose: XX UNITS/HR UNITS Q8H  LOVENOX:[] YES [] NO  Dose: XX mg Q24H  COUMADIN: []  YES [] NO  Dose: XX mg  Q24H  SCD's: YES b/l  GI Prophylaxis: Protonix [], Pepcid [], None [], (Contra-indication:.....)    Post-Op Beta-Blockers: Yes [], No[], If No, then contraindication:  Post-Op Aspirin: Yes [],  No [], If No, then contraindication:  Post-Op Statin: Yes [], No[], If No, then contraindication:  Allergies    No Known Allergies    Intolerances      Ambulation/Activity Status:    Assessment/Plan:  68y Female status-post .....  - Case and plan discussed with CTU Intensivist and CT Surgeon - Dr. Urbano/Flaco/Shawn   - Continue CTU supportive care    - Continue DVT/GI prophylaxis  - Incentive Spirometry 10 times an hour  - Continue to advance physical activity as tolerated and continue PT/OT as directed  1. CAD: Continue ASA, statin, BB  2. HTN:   3. A. Fib:   4. COPD/Hypoxia:   5. DM/Glucose Control:     Social Service Disposition: SUBJECTIVE ASSESSMENT:   pt still c/o not feeling well    Vital Signs Last 24 Hrs  T(F): 97 (12 Nov 2019 04:00), Max: 97.4 (11 Nov 2019 20:00)  HR: 80 (12 Nov 2019 07:30) (80 - 96)  BP: 137/64 (12 Nov 2019 06:00) (107/55 - 156/67)  BP(mean): 92 (12 Nov 2019 06:00) (73 - 108)  RR: 23 (12 Nov 2019 07:30) (17 - 35)  SpO2: 96% (12 Nov 2019 07:30) (92% - 97%)    I&O's Detail    11 Nov 2019 07:01  -  12 Nov 2019 07:00  --------------------------------------------------------  IN:    IV PiggyBack: 100 mL    milrinone  Infusion: 8.4 mL    milrinone  Infusion: 66 mL    Oral Fluid: 1200 mL    sodium chloride 0.9%.: 240 mL  Total IN: 1614.4 mL    OUT:    Chest Tube: 440 mL    Indwelling Catheter - Urethral: 1505 mL  Total OUT: 1945 mL        Net:   I&O's Detail    10 Nov 2019 07:01  -  11 Nov 2019 07:00  --------------------------------------------------------  Total NET: -2014.6 mL      11 Nov 2019 07:01  -  12 Nov 2019 07:00  --------------------------------------------------------  Total NET: -330.6 mL    CAPILLARY BLOOD GLUCOSE  102 (12 Nov 2019 07:00)  POCT Blood Glucose.: 102 mg/dL (12 Nov 2019 06:43)  POCT Blood Glucose.: 87 mg/dL (11 Nov 2019 21:30)  POCT Blood Glucose.: 111 mg/dL (11 Nov 2019 15:57)  POCT Blood Glucose.: 112 mg/dL (11 Nov 2019 11:46)    Physical Exam:  General: NAD; A&Ox3/Patient is intubated and sedated  Cardiac: S1/S2, RRR, no murmur, no rubs  Lungs: unlabored respirations, CTA b/l, no wheeze, no rales, no crackles  Abdomen: Soft/NT/ND; positive bowel sounds x 4  Sternum: Intact, no click, incision healing well with no drainage  Incisions: Incisions clean/dry/intact  Extremities: No edema b/l lower extremities; good capillary refill; no cyanosis; palpable 1+ pedal pulses b/l    Central Venous Catheter: Yes[]  No[] , If Yes indication:           Day #  Castellon Catheter: Yes  [] , No  [] , If yes indication:                      Day #  NGT: Yes [] No [] ,    If Yes Placement:                                     Day #  EPICARDIAL WIRES:  [] YES [] NO                                              Day #  BOWEL MOVEMENT:  [] YES [] NO, If No, Timing since last BM:      Day #  CHEST TUBE(Left/Right):  [] YES [] NO, If yes -  AIR LEAKS:  [] YES [] NO        LABS:                        8.7<L>  5.93  )-----------( 192      ( 12 Nov 2019 01:20 )             27.3<L>                        8.9<L>  5.15  )-----------( 160      ( 11 Nov 2019 01:00 )             29.2<L>    11-12    144  |  108  |  73<HH>  ----------------------------<  90  4.0   |  22  |  1.3  11-11    144  |  107  |  86<HH>  ----------------------------<  115<H>  4.4   |  25  |  1.4    Ca    9.4      12 Nov 2019 01:20  Mg     2.0     11-10    TPro  5.1<L> [6.0 - 8.0]  /  Alb  3.4<L> [3.5 - 5.2]  /  TBili  0.2 [0.2 - 1.2]  /  DBili  x   /  AST  10 [0 - 41]  /  ALT  9 [0 - 41]  /  AlkPhos  72 [30 - 115]  11-11    MEDICATIONS  (STANDING):  albuterol/ipratropium for Nebulization 3 milliLiter(s) Nebulizer every 6 hours  ascorbic acid 500 milliGRAM(s) Oral daily  aspirin  chewable 81 milliGRAM(s) Oral daily  atorvastatin 40 milliGRAM(s) Oral at bedtime  carvedilol 6.25 milliGRAM(s) Oral every 12 hours  heparin  Injectable 5000 Unit(s) SubCutaneous every 8 hours  hydrALAZINE 20 milliGRAM(s) Oral every 8 hours  insulin glargine Injectable (LANTUS) 20 Unit(s) SubCutaneous every morning  insulin lispro (HumaLOG) corrective regimen sliding scale   SubCutaneous three times a day before meals  isosorbide   dinitrate Tablet (ISORDIL) 10 milliGRAM(s) Oral three times a day  megestrol Suspension 800 milliGRAM(s) Oral daily  metFORMIN 1000 milliGRAM(s) Oral every 12 hours  pantoprazole    Tablet 40 milliGRAM(s) Oral before breakfast  polyethylene glycol 3350 17 Gram(s) Oral every 24 hours  senna 1 Tablet(s) Oral every 12 hours  sertraline 25 milliGRAM(s) Oral daily  tiotropium 18 MICROgram(s) Capsule 1 Capsule(s) Inhalation daily  zinc sulfate 220 milliGRAM(s) Oral daily    MEDICATIONS  (PRN):  acetaminophen   Tablet .. 650 milliGRAM(s) Oral every 6 hours PRN Mild Pain (1 - 3)  dextrose 40% Gel 15 Gram(s) Oral once PRN Blood Glucose LESS THAN 70 milliGRAM(s)/deciliter  glucagon  Injectable 1 milliGRAM(s) IntraMuscular once PRN Glucose LESS THAN 70 milligrams/deciliter  oxyCODONE    IR 5 milliGRAM(s) Oral at bedtime PRN Mild Pain (1 - 3)    HEPARIN:  [] YES [] NO  Dose: XX UNITS/HR UNITS Q8H  LOVENOX:[] YES [] NO  Dose: XX mg Q24H  COUMADIN: []  YES [] NO  Dose: XX mg  Q24H  SCD's: YES b/l  GI Prophylaxis: Protonix [], Pepcid [], None [], (Contra-indication:.....)    Post-Op Beta-Blockers: Yes [], No[], If No, then contraindication:  Post-Op Aspirin: Yes [],  No [], If No, then contraindication:  Post-Op Statin: Yes [], No[], If No, then contraindication:  Allergies    No Known Allergies    Intolerances      Ambulation/Activity Status:    Assessment/Plan:  68y Female status-post .....  - Case and plan discussed with CTU Intensivist and CT Surgeon - Dr. Urbano/Flaco/Shawn   - Continue CTU supportive care    - Continue DVT/GI prophylaxis  - Incentive Spirometry 10 times an hour  - Continue to advance physical activity as tolerated and continue PT/OT as directed  1. CAD: Continue ASA, statin, BB  2. HTN:   3. A. Fib:   4. COPD/Hypoxia:   5. DM/Glucose Control:     Social Service Disposition:

## 2019-11-13 ENCOUNTER — TRANSCRIPTION ENCOUNTER (OUTPATIENT)
Age: 69
End: 2019-11-13

## 2019-11-13 LAB
ANION GAP SERPL CALC-SCNC: 13 MMOL/L — SIGNIFICANT CHANGE UP (ref 7–14)
BUN SERPL-MCNC: 60 MG/DL — HIGH (ref 10–20)
CALCIUM SERPL-MCNC: 9.5 MG/DL — SIGNIFICANT CHANGE UP (ref 8.5–10.1)
CHLORIDE SERPL-SCNC: 109 MMOL/L — SIGNIFICANT CHANGE UP (ref 98–110)
CO2 SERPL-SCNC: 22 MMOL/L — SIGNIFICANT CHANGE UP (ref 17–32)
CREAT SERPL-MCNC: 1.2 MG/DL — SIGNIFICANT CHANGE UP (ref 0.7–1.5)
GLUCOSE BLDC GLUCOMTR-MCNC: 136 MG/DL — HIGH (ref 70–99)
GLUCOSE BLDC GLUCOMTR-MCNC: 92 MG/DL — SIGNIFICANT CHANGE UP (ref 70–99)
GLUCOSE BLDC GLUCOMTR-MCNC: 97 MG/DL — SIGNIFICANT CHANGE UP (ref 70–99)
GLUCOSE SERPL-MCNC: 101 MG/DL — HIGH (ref 70–99)
HCT VFR BLD CALC: 31.3 % — LOW (ref 37–47)
HGB BLD-MCNC: 10.2 G/DL — LOW (ref 12–16)
MAGNESIUM SERPL-MCNC: 1.5 MG/DL — LOW (ref 1.8–2.4)
MCHC RBC-ENTMCNC: 28.5 PG — SIGNIFICANT CHANGE UP (ref 27–31)
MCHC RBC-ENTMCNC: 32.6 G/DL — SIGNIFICANT CHANGE UP (ref 32–37)
MCV RBC AUTO: 87.4 FL — SIGNIFICANT CHANGE UP (ref 81–99)
NRBC # BLD: 0 /100 WBCS — SIGNIFICANT CHANGE UP (ref 0–0)
PLATELET # BLD AUTO: 237 K/UL — SIGNIFICANT CHANGE UP (ref 130–400)
POTASSIUM SERPL-MCNC: 3.7 MMOL/L — SIGNIFICANT CHANGE UP (ref 3.5–5)
POTASSIUM SERPL-SCNC: 3.7 MMOL/L — SIGNIFICANT CHANGE UP (ref 3.5–5)
PREALB SERPL-MCNC: 26 MG/DL — SIGNIFICANT CHANGE UP (ref 20–40)
RBC # BLD: 3.58 M/UL — LOW (ref 4.2–5.4)
RBC # FLD: 16.6 % — HIGH (ref 11.5–14.5)
SODIUM SERPL-SCNC: 144 MMOL/L — SIGNIFICANT CHANGE UP (ref 135–146)
WBC # BLD: 6.25 K/UL — SIGNIFICANT CHANGE UP (ref 4.8–10.8)
WBC # FLD AUTO: 6.25 K/UL — SIGNIFICANT CHANGE UP (ref 4.8–10.8)

## 2019-11-13 PROCEDURE — 99024 POSTOP FOLLOW-UP VISIT: CPT

## 2019-11-13 PROCEDURE — 71045 X-RAY EXAM CHEST 1 VIEW: CPT | Mod: 26

## 2019-11-13 PROCEDURE — 99221 1ST HOSP IP/OBS SF/LOW 40: CPT

## 2019-11-13 PROCEDURE — 99232 SBSQ HOSP IP/OBS MODERATE 35: CPT

## 2019-11-13 RX ORDER — METFORMIN HYDROCHLORIDE 850 MG/1
1000 TABLET ORAL EVERY 12 HOURS
Refills: 0 | Status: DISCONTINUED | OUTPATIENT
Start: 2019-11-13 | End: 2019-11-19

## 2019-11-13 RX ORDER — POTASSIUM CHLORIDE 20 MEQ
40 PACKET (EA) ORAL ONCE
Refills: 0 | Status: COMPLETED | OUTPATIENT
Start: 2019-11-13 | End: 2019-11-13

## 2019-11-13 RX ORDER — SACUBITRIL AND VALSARTAN 24; 26 MG/1; MG/1
1 TABLET, FILM COATED ORAL
Refills: 0 | Status: DISCONTINUED | OUTPATIENT
Start: 2019-11-13 | End: 2019-11-19

## 2019-11-13 RX ORDER — MIRTAZAPINE 45 MG/1
7.5 TABLET, ORALLY DISINTEGRATING ORAL AT BEDTIME
Refills: 0 | Status: DISCONTINUED | OUTPATIENT
Start: 2019-11-13 | End: 2019-11-19

## 2019-11-13 RX ORDER — MAGNESIUM SULFATE 500 MG/ML
2 VIAL (ML) INJECTION ONCE
Refills: 0 | Status: COMPLETED | OUTPATIENT
Start: 2019-11-13 | End: 2019-11-13

## 2019-11-13 RX ADMIN — INSULIN GLARGINE 20 UNIT(S): 100 INJECTION, SOLUTION SUBCUTANEOUS at 10:45

## 2019-11-13 RX ADMIN — CARVEDILOL PHOSPHATE 9.38 MILLIGRAM(S): 80 CAPSULE, EXTENDED RELEASE ORAL at 17:20

## 2019-11-13 RX ADMIN — CARVEDILOL PHOSPHATE 9.38 MILLIGRAM(S): 80 CAPSULE, EXTENDED RELEASE ORAL at 06:56

## 2019-11-13 RX ADMIN — HEPARIN SODIUM 5000 UNIT(S): 5000 INJECTION INTRAVENOUS; SUBCUTANEOUS at 22:19

## 2019-11-13 RX ADMIN — HEPARIN SODIUM 5000 UNIT(S): 5000 INJECTION INTRAVENOUS; SUBCUTANEOUS at 14:29

## 2019-11-13 RX ADMIN — ATORVASTATIN CALCIUM 40 MILLIGRAM(S): 80 TABLET, FILM COATED ORAL at 22:19

## 2019-11-13 RX ADMIN — NYSTATIN CREAM 1 APPLICATION(S): 100000 CREAM TOPICAL at 18:18

## 2019-11-13 RX ADMIN — OXYCODONE HYDROCHLORIDE 5 MILLIGRAM(S): 5 TABLET ORAL at 23:34

## 2019-11-13 RX ADMIN — OXYCODONE HYDROCHLORIDE 5 MILLIGRAM(S): 5 TABLET ORAL at 22:34

## 2019-11-13 RX ADMIN — SODIUM CHLORIDE 3 MILLILITER(S): 9 INJECTION INTRAMUSCULAR; INTRAVENOUS; SUBCUTANEOUS at 14:29

## 2019-11-13 RX ADMIN — Medication 20 MILLIGRAM(S): at 06:56

## 2019-11-13 RX ADMIN — ZINC SULFATE TAB 220 MG (50 MG ZINC EQUIVALENT) 220 MILLIGRAM(S): 220 (50 ZN) TAB at 12:33

## 2019-11-13 RX ADMIN — HEPARIN SODIUM 5000 UNIT(S): 5000 INJECTION INTRAVENOUS; SUBCUTANEOUS at 06:56

## 2019-11-13 RX ADMIN — Medication 50 GRAM(S): at 06:55

## 2019-11-13 RX ADMIN — SERTRALINE 25 MILLIGRAM(S): 25 TABLET, FILM COATED ORAL at 12:33

## 2019-11-13 RX ADMIN — SODIUM CHLORIDE 3 MILLILITER(S): 9 INJECTION INTRAMUSCULAR; INTRAVENOUS; SUBCUTANEOUS at 05:34

## 2019-11-13 RX ADMIN — PANTOPRAZOLE SODIUM 40 MILLIGRAM(S): 20 TABLET, DELAYED RELEASE ORAL at 06:56

## 2019-11-13 RX ADMIN — Medication 81 MILLIGRAM(S): at 12:33

## 2019-11-13 RX ADMIN — SACUBITRIL AND VALSARTAN 1 TABLET(S): 24; 26 TABLET, FILM COATED ORAL at 12:33

## 2019-11-13 RX ADMIN — SACUBITRIL AND VALSARTAN 1 TABLET(S): 24; 26 TABLET, FILM COATED ORAL at 17:21

## 2019-11-13 RX ADMIN — Medication 40 MILLIEQUIVALENT(S): at 06:57

## 2019-11-13 RX ADMIN — MEGESTROL ACETATE 800 MILLIGRAM(S): 40 SUSPENSION ORAL at 12:33

## 2019-11-13 RX ADMIN — MIRTAZAPINE 7.5 MILLIGRAM(S): 45 TABLET, ORALLY DISINTEGRATING ORAL at 22:19

## 2019-11-13 RX ADMIN — NYSTATIN CREAM 1 APPLICATION(S): 100000 CREAM TOPICAL at 18:19

## 2019-11-13 RX ADMIN — Medication 500 MILLIGRAM(S): at 12:34

## 2019-11-13 RX ADMIN — METFORMIN HYDROCHLORIDE 1000 MILLIGRAM(S): 850 TABLET ORAL at 18:18

## 2019-11-13 RX ADMIN — SENNA PLUS 1 TABLET(S): 8.6 TABLET ORAL at 06:57

## 2019-11-13 RX ADMIN — SODIUM CHLORIDE 3 MILLILITER(S): 9 INJECTION INTRAMUSCULAR; INTRAVENOUS; SUBCUTANEOUS at 22:19

## 2019-11-13 NOTE — DISCHARGE NOTE NURSING/CASE MANAGEMENT/SOCIAL WORK - NSDCPEEMAIL_GEN_ALL_CORE
[facility] email [email or web address]

## 2019-11-13 NOTE — DISCHARGE NOTE NURSING/CASE MANAGEMENT/SOCIAL WORK - PATIENT PORTAL LINK FT
You can access the FollowMyHealth Patient Portal offered by [facility] by registering at the following website: [email or web address]. By joining [facility]’s FollowMyHealth portal, you will also be able to view your health information using other applications (apps) compatible with our system.

## 2019-11-13 NOTE — PROGRESS NOTE ADULT - ASSESSMENT
69 y/o F with h/o CAD s/p CABG, ICM, chronic systolic heart failure admitted with acute leg pain and rash. DVT ruled out. Patient came large pleural effusion s/p pigtail catheter.

## 2019-11-13 NOTE — CONSULT NOTE ADULT - REASON FOR ADMISSION
Shortness of breath, leg pain
SOB/ rash of LE

## 2019-11-13 NOTE — PROGRESS NOTE ADULT - SUBJECTIVE AND OBJECTIVE BOX
SURGEON(s): CHINA Escobar  SUBJECTIVE ASSESSMENT:  Patient has no complaints at this time.    Vital Signs Last 24 Hrs  T(F): 97.5 (13 Nov 2019 04:00), Max: 97.6 (13 Nov 2019 00:00)  HR: 79 (13 Nov 2019 08:00) (79 - 99) SR  BP: 165/87 (13 Nov 2019 08:00) (110/55 - 165/87)  BP(mean): 121 (13 Nov 2019 08:00) (78 - 121)  RR: 16 (13 Nov 2019 08:00) (14 - 37)  SpO2: 95% (13 Nov 2019 08:00) (94% - 100%) RA      I&O's Detail    12 Nov 2019 07:01  -  13 Nov 2019 07:00  --------------------------------------------------------  IN:    IV PiggyBack: 100 mL    milrinone  Infusion: 3 mL    Oral Fluid: 1010 mL    sodium chloride 0.9%.: 30 mL  Total IN: 1143 mL    OUT:    Chest Tube: 200 mL    Indwelling Catheter - Urethral: 2225 mL  Total OUT: 2425 mL        Net:   I&O's Detail    11 Nov 2019 07:01  -  12 Nov 2019 07:00  --------------------------------------------------------  Total NET: -330.6 mL      12 Nov 2019 07:01  -  13 Nov 2019 07:00  --------------------------------------------------------  Total NET: -1282 mL        CAPILLARY BLOOD GLUCOSE  119 (12 Nov 2019 16:00)  109 (12 Nov 2019 11:00)  102 (12 Nov 2019 07:00)      POCT Blood Glucose.: 92 mg/dL (13 Nov 2019 07:00)  POCT Blood Glucose.: 117 mg/dL (12 Nov 2019 21:50)  POCT Blood Glucose.: 119 mg/dL (12 Nov 2019 16:31)  POCT Blood Glucose.: 109 mg/dL (12 Nov 2019 11:05)    Physical Exam:  General: NAD; A&Ox3  Cardiac: S1/S2, RRR, no murmur, no rubs  Lungs: unlabored respirations, CTA b/l, no wheeze, no rales, no crackles  Abdomen: Soft/NT/ND; positive bowel sounds x 4  Sternum: Intact, no click, incision healing well with no drainage  Incisions: Incisions clean/dry/intact  Extremities: No edema b/l lower extremities; good capillary refill; no cyanosis; palpable 1+ pedal pulses b/l    Central Venous Catheter: Yes[]  No[x] , If Yes indication:           Day #  Mon[place]jo Catheter: Yes  [x] , No  [] , If yes indication:   bladder prolapse; montejo managed by urology              NGT: Yes [] No [x] ,    If Yes Placement:                                     Day #  EPICARDIAL WIRES:  [] YES [x] NO                                              Day #  BOWEL MOVEMENT:  [x] YES [] NO, If No, Timing since last BM:      Day #  CHEST TUBE(Right):  [x] YES [] NO, If yes -  AIR LEAKS:  [] YES [x] NO        LABS:                        10.2<L>  6.25  )-----------( 237      ( 13 Nov 2019 03:20 )             31.3<L>                        8.7<L>  5.93  )-----------( 192      ( 12 Nov 2019 01:20 )             27.3<L>    11-13    144  |  109  |  60<H>  ----------------------------<  101<H>  KCL 40mcg PO given  3.7   |  22  |  1.2  11-12    144  |  108  |  73<HH>  ----------------------------<  90  4.0   |  22  |  1.3    Ca    9.5      13 Nov 2019 03:20  Mg     1.5     11-13  -  2g Mg IV given    TPro  5.1<L> [6.0 - 8.0]  /  Alb  3.4<L> [3.5 - 5.2]  /  TBili  0.2 [0.2 - 1.2]  /  DBili  x   /  AST  10 [0 - 41]  /  ALT  9 [0 - 41]  /  AlkPhos  72 [30 - 115]  11-11    MEDICATIONS  (STANDING):  albuterol/ipratropium for Nebulization 3 milliLiter(s) Nebulizer every 6 hours  ascorbic acid 500 milliGRAM(s) Oral daily  aspirin  chewable 81 milliGRAM(s) Oral daily  atorvastatin 40 milliGRAM(s) Oral at bedtime  carvedilol 9.375 milliGRAM(s) Oral every 12 hours  dextrose 5%. 1000 milliLiter(s) (50 mL/Hr) IV Continuous <Continuous>  dextrose 50% Injectable 12.5 Gram(s) IV Push once  dextrose 50% Injectable 25 Gram(s) IV Push once  dextrose 50% Injectable 25 Gram(s) IV Push once  heparin  Injectable 5000 Unit(s) SubCutaneous every 8 hours  hydrALAZINE 20 milliGRAM(s) Oral every 8 hours  insulin glargine Injectable (LANTUS) 20 Unit(s) SubCutaneous every morning  insulin lispro (HumaLOG) corrective regimen sliding scale   SubCutaneous three times a day before meals  isosorbide   mononitrate ER Tablet (IMDUR) 30 milliGRAM(s) Oral daily  megestrol Suspension 800 milliGRAM(s) Oral daily  nystatin Cream 1 Application(s) Topical two times a day  pantoprazole    Tablet 40 milliGRAM(s) Oral before breakfast  polyethylene glycol 3350 17 Gram(s) Oral every 24 hours  senna 1 Tablet(s) Oral every 12 hours  sertraline 25 milliGRAM(s) Oral daily  sodium chloride 0.9% lock flush 3 milliLiter(s) IV Push every 8 hours  sodium chloride 0.9%. 1000 milliLiter(s) (10 mL/Hr) IV Continuous <Continuous>  tiotropium 18 MICROgram(s) Capsule 1 Capsule(s) Inhalation daily  zinc sulfate 220 milliGRAM(s) Oral daily    MEDICATIONS  (PRN):  acetaminophen   Tablet .. 650 milliGRAM(s) Oral every 6 hours PRN Mild Pain (1 - 3)  dextrose 40% Gel 15 Gram(s) Oral once PRN Blood Glucose LESS THAN 70 milliGRAM(s)/deciliter  glucagon  Injectable 1 milliGRAM(s) IntraMuscular once PRN Glucose LESS THAN 70 milligrams/deciliter  oxyCODONE    IR 5 milliGRAM(s) Oral at bedtime PRN Mild Pain (1 - 3)    HEPARIN:  [x] YES [] NO  Dose: 5000 UNITS Q8H  SCD's: YES b/l  GI Prophylaxis: Protonix [x], Pepcid [], None [], (Contra-indication:.....)    Post-Op Aspirin: Yes [x],  No [], If No, then contra-indication:  Post-Op Statin: Yes [x], No[], If No, then contra-indication:  Post-Op Beta-Blockers: Yes [x], No [], If No, then contra-indication:    Allergies:  No Known Allergies      Ambulation/Activity Status: Ambulates several times daily with assistance.    Assessment/Plan:  68y Female  - Case and plan discussed with CTU Intensivist and CT Surgeon - Dr. Urbano/Flaco/Shawn   - Continue CTU supportive care    - Continue DVT/GI prophylaxis  - Incentive Spirometry 10 times an hour  - Continue to advance physical activity as tolerated and continue PT/OT as directed  1. CAD: Continue ASA, statin, BB  2. F/U heart failure team plan  3. D/C chest tube tomorrow  4. Possible d/c home tomorrow SURGEON(s): CHINA Escobar  SUBJECTIVE ASSESSMENT:  Patient has no complaints at this time.    Vital Signs Last 24 Hrs  T(F): 97.5 (13 Nov 2019 04:00), Max: 97.6 (13 Nov 2019 00:00)  HR: 79 (13 Nov 2019 08:00) (79 - 99) SR  BP: 165/87 (13 Nov 2019 08:00) (110/55 - 165/87)  BP(mean): 121 (13 Nov 2019 08:00) (78 - 121)  RR: 16 (13 Nov 2019 08:00) (14 - 37)  SpO2: 95% (13 Nov 2019 08:00) (94% - 100%) RA    I&O's Detail    12 Nov 2019 07:01  -  13 Nov 2019 07:00  --------------------------------------------------------  IN:    IV PiggyBack: 100 mL    milrinone  Infusion: 3 mL    Oral Fluid: 1010 mL    sodium chloride 0.9%.: 30 mL  Total IN: 1143 mL    OUT:    Chest Tube: 200 mL    Indwelling Catheter - Urethral: 2225 mL  Total OUT: 2425 mL    Net:   I&O's Detail    11 Nov 2019 07:01  -  12 Nov 2019 07:00  --------------------------------------------------------  Total NET: -330.6 mL    12 Nov 2019 07:01  -  13 Nov 2019 07:00  --------------------------------------------------------  Total NET: -1282 mL    CAPILLARY BLOOD GLUCOSE  119 (12 Nov 2019 16:00)  109 (12 Nov 2019 11:00)  102 (12 Nov 2019 07:00)  POCT Blood Glucose.: 92 mg/dL (13 Nov 2019 07:00)  POCT Blood Glucose.: 117 mg/dL (12 Nov 2019 21:50)  POCT Blood Glucose.: 119 mg/dL (12 Nov 2019 16:31)  POCT Blood Glucose.: 109 mg/dL (12 Nov 2019 11:05)    Physical Exam:  General: NAD; A&Ox3  Cardiac: S1/S2, RRR, no murmur, no rubs  Lungs: unlabored respirations, CTA b/l, no wheeze, no rales, no crackles  Abdomen: Soft/NT/ND; positive bowel sounds x 4  Sternum: Intact, no click, incision healing well with no drainage  Incisions: Incisions clean/dry/intact  Extremities: No edema b/l lower extremities; good capillary refill; no cyanosis; palpable 1+ pedal pulses b/l    Central Venous Catheter: Yes[]  No[x] , If Yes indication:           Day #  Montejo Catheter: Yes  [x] , No  [] , If yes indication:   bladder prolapse; montejo managed by urology              NGT: Yes [] No [x] ,    If Yes Placement:                                     Day #  EPICARDIAL WIRES:  [] YES [x] NO                                              Day #  BOWEL MOVEMENT:  [x] YES [] NO, If No, Timing since last BM:      Day #  CHEST TUBE(Right):  [x] YES [] NO, If yes -  AIR LEAKS:  [] YES [x] NO        LABS:                        10.2<L>  6.25  )-----------( 237      ( 13 Nov 2019 03:20 )             31.3<L>                        8.7<L>  5.93  )-----------( 192      ( 12 Nov 2019 01:20 )             27.3<L>    11-13    144  |  109  |  60<H>  ----------------------------<  101<H>  KCL 40mcg PO given  3.7   |  22  |  1.2  11-12    144  |  108  |  73<HH>  ----------------------------<  90  4.0   |  22  |  1.3    Ca    9.5      13 Nov 2019 03:20  Mg     1.5     11-13  -  2g Mg IV given    TPro  5.1<L> [6.0 - 8.0]  /  Alb  3.4<L> [3.5 - 5.2]  /  TBili  0.2 [0.2 - 1.2]  /  DBili  x   /  AST  10 [0 - 41]  /  ALT  9 [0 - 41]  /  AlkPhos  72 [30 - 115]  11-11    MEDICATIONS  (STANDING):  albuterol/ipratropium for Nebulization 3 milliLiter(s) Nebulizer every 6 hours  ascorbic acid 500 milliGRAM(s) Oral daily  aspirin  chewable 81 milliGRAM(s) Oral daily  atorvastatin 40 milliGRAM(s) Oral at bedtime  carvedilol 9.375 milliGRAM(s) Oral every 12 hours  heparin  Injectable 5000 Unit(s) SubCutaneous every 8 hours  hydrALAZINE 20 milliGRAM(s) Oral every 8 hours  insulin glargine Injectable (LANTUS) 20 Unit(s) SubCutaneous every morning  insulin lispro (HumaLOG) corrective regimen sliding scale   SubCutaneous three times a day before meals  isosorbide   mononitrate ER Tablet (IMDUR) 30 milliGRAM(s) Oral daily  megestrol Suspension 800 milliGRAM(s) Oral daily  nystatin Cream 1 Application(s) Topical two times a day  pantoprazole    Tablet 40 milliGRAM(s) Oral before breakfast  polyethylene glycol 3350 17 Gram(s) Oral every 24 hours  senna 1 Tablet(s) Oral every 12 hours  sertraline 25 milliGRAM(s) Oral daily  tiotropium 18 MICROgram(s) Capsule 1 Capsule(s) Inhalation daily  zinc sulfate 220 milliGRAM(s) Oral daily    MEDICATIONS  (PRN):  acetaminophen   Tablet .. 650 milliGRAM(s) Oral every 6 hours PRN Mild Pain (1 - 3)  dextrose 40% Gel 15 Gram(s) Oral once PRN Blood Glucose LESS THAN 70 milliGRAM(s)/deciliter  glucagon  Injectable 1 milliGRAM(s) IntraMuscular once PRN Glucose LESS THAN 70 milligrams/deciliter  oxyCODONE    IR 5 milliGRAM(s) Oral at bedtime PRN Mild Pain (1 - 3)    HEPARIN:  [x] YES [] NO  Dose: 5000 UNITS Q8H  SCD's: YES b/l  GI Prophylaxis: Protonix [x], Pepcid [], None [], (Contra-indication:.....)    Post-Op Aspirin: Yes [x],  No [], If No, then contra-indication:  Post-Op Statin: Yes [x], No[], If No, then contra-indication:  Post-Op Beta-Blockers: Yes [x], No [], If No, then contra-indication:    Allergies:  No Known Allergies    Ambulation/Activity Status: Ambulates several times daily with assistance.    Assessment/Plan:  68y Female  - Case and plan discussed with CTU Intensivist and CT Surgeon - Dr. Urbano/Flaco/hSawn   - Continue CTU supportive care    - Continue DVT/GI prophylaxis  - Incentive Spirometry 10 times an hour  - Continue to advance physical activity as tolerated and continue PT/OT as directed  1. CAD: Continue ASA, statin, BB  2. F/U heart failure team plan  3. D/C chest tube tomorrow  4. Possible d/c home tomorrow

## 2019-11-13 NOTE — PROGRESS NOTE ADULT - PROBLEM SELECTOR PLAN 1
ICM s/p CABG   Started on milrinone last week- now off  On hydralazine 20 mg tid and isordil 20 mg tid   BP today 140-150s HR 70s    Start Bumex 1 mg daily   Start Entresto 49-51 mg BID and DC hydralazine/isordil   Continue carvedilol 9.375 mg BID   Daily weights   Low salt diet   Aggressive PT  Incentive inspirometry    Patient will likely benefit from going to rehab upon discharge  Glucerna as supplement   Psychiatry evaluation appreciated   Discussed with CTU and patient ICM s/p CABG   Started on milrinone last week- now off  On hydralazine 20 mg tid and isordil 20 mg tid   BP today 140-150s HR 70s    Start Bumex 1 mg daily   Start Entresto 49-51 mg BID and DC hydralazine/isordil   Continue carvedilol 9.375 mg BID   -increase to 12.5 mg bid tomorrow if SBP> 110 mmHg-  Daily weights   Low salt diet   Aggressive PT  Incentive inspirometry    Patient will likely benefit from going to rehab upon discharge  Glucerna as supplement   Psychiatry evaluation appreciated   Discussed with CTU and patient

## 2019-11-13 NOTE — PROGRESS NOTE ADULT - SUBJECTIVE AND OBJECTIVE BOX
Interval history: Patient reports feeling better, she walked 3-4 times yesterday without significant difficulty. Her mood is still down though.         HPI:  69 y/o F with h/o CAD s/p CABG, ICM, chronic systolic heart failure with EF ~ 25% coming today with rash in RLE and pain. Patient can't bear any weight. The pain started suddenly. She also notes some SOB but this is not that significant compared to her baseline. She can walk minimally around the house before getting SOB. She denies PND or orthopnea but has pedal edema. No LOC, palpitations, bleeding episodes.       Patient admitted to CTU and started on broad spectrum Abx, started on iv diuretics. She reports feeling slightly better from her RLE pain.     PSH: CABG    Social: No ETOH or smoking

## 2019-11-13 NOTE — DISCHARGE NOTE NURSING/CASE MANAGEMENT/SOCIAL WORK - REASON FOR REFUSAL (REFER PATIENT TO HEALTHCARE PROVIDER FOR FOLLOW-UP):
She doesn't remember if she got it already. she wants to check with her family and she will get it outpatient if anything.

## 2019-11-13 NOTE — CONSULT NOTE ADULT - SUBJECTIVE AND OBJECTIVE BOX
Reviewed and referred to chart notes.    pt has hx multiple medical and health issues. she is observed to resting and engaging quite well. she reports to be feeling stressed and overwhelmed at times. admits feeling depressed and frustrated at times. deneis any loss of interest or lack of motivation. she is futuristic, wants to go home and spend time with family. denies any panic or intense anxiety episode. admits difficulty falling and maintain adequate sleep. no memory issues. no s/h ideations. no hallucinations or delusions.    no past psych hx.    alert ox 3 mood depressed, no psychosis, no threat to self or others. i/j good.

## 2019-11-13 NOTE — DISCHARGE NOTE NURSING/CASE MANAGEMENT/SOCIAL WORK - NSDCPEWEB_GEN_ALL_CORE
[facility] website --- [email or web address] website --- [email or web address]

## 2019-11-13 NOTE — CONSULT NOTE ADULT - ASSESSMENT
ASSESSMENT  69 y/o F with h/o CAD s/p CABG, ICM, chronic systolic heart failure with EF ~ 25% coming today with rash in RLE and pain. Patient can't bear any weight. The pain started suddenly. She also notes some SOB but this is not that significant compared to her baseline. She can walk minimally around the house before getting SOB. She denies PND or orthopnea but has pedal edema.       PROBLEMS  1. Suspected Gram negative pneumonia    New problem with additional W/U  acute illness with systemic symptoms     CT with dense consolidative opacity in the right lower lobe and right middle lobe,   On cefepime   IVPB 1000 milliGRAM(s) IV Intermittent every 12 hours  vancomycin  IVPB 1000 milliGRAM(s) IV Intermittent every 12 hours    11/2 Cxray with increasing right base opacity and stable left base opacity    2. CT with  right-sided pleural effusion and small left pleural effusion.  S/P thoracentesis    3. Atelectasis on CT    4. Resolving RIC    5. Alkalosis      PLAN  - Continue Vanco/Cefepime  Nasal MRSA PCR: If negative D/C Vanco  Urine Legionella Antigen  Await Bronch results
Adjustment disorder depressed mood  r/o depression secondary to medical problems.    remaro 7.5 mg po q 8pm  no need for ipp  can f/u py pcp upon discharge.
IMP:  - 67 yo female admitted with increasing SOB and RLE pain  - + cellulitis and RLL pneumonia  - s/p thoracentesis for pleural effusion  - DM - improved glycemic control  - + severe protein calorie malnutrition  - + sacral/ perineal skin breakdown    - h/o NSTEMI, CHF, pulm edema, resp failure, DKA in Sept-Oct     - hospital course included CABG, + C difficile, episode of melena, perineal and gluteal skin breakdown  - recent h/o hip fracture and later fecal impaction, recent labial abscess drainage  - h/o uncontrolled DM, A fib, viral myopathy  - per pt her sense of taste is still somewhat diminished.     SUGGEST:  - zinc level never done. Suggest giving ZnSO4 220 mg po once daily x 4 weeks then MWF for 4 weeks.  - vitamin C 500 mg po once daily x 1 month, then re-evaluate  - suggest MV + minerals such as OneADay womens or generic equivalent at home, once daily  - physical therapy for muscle strengthening   - attn to adequacy of protein intake - pt given suggestions  - pt likes Glucerna Shake - only vanilla - would add one Benprotein to the 8 oz shake, and give these to pt twice daily. Pt & daughter given suggestions regarding flavorings.  - consider obtaining Boost VHC to take 8 oz po between meals 2/d at home, instead.  - small snacks between meals and at hs daily - pt given suggestions  - Chris po twice daily at home x 1 month - restart it now, as pt afebrile and wbc acceptable.  - repeat K in am, now that fluid status stabilized - and check phos level. Nepro should not be necessary.
IMPRESSION: Rehab of cardiac debilitation     PRECAUTIONS: [x  ] Cardiac  [  ] Respiratory  [  ] Seizures [  ] Contact Isolation  [  ] Droplet Isolation  [  ] Other    Weight Bearing Status:     RECOMMENDATION:    Out of Bed to Chair     DVT/Decubiti Prophylaxis    REHAB PLAN:     [ x  ] Bedside P/T 3-5 times a week   [   ]   Bedside O/T  2-3 times a week             [   ] No Rehab Therapy Indicated                   [   ]  Speech Therapy   Conditioning/ROM                                    ADL  Bed Mobility                                               Conditioning/ROM  Transfers                                                     Bed Mobility  Sitting /Standing Balance                         Transfers                                        Gait Training                                               Sitting/Standing Balance  Stair Training [   ]Applicable                    Home equipment Eval                                                                        Splinting  [   ] Only      GOALS:   ADL   [   ]   Independent                    Transfers  [  x ] Independent                          Ambulation  [x   ] Independent     [  x  ] With device                            [   ]  CG                                                         [   ]  CG                                                                  [   ] CG                            [    ] Min A                                                   [   ] Min A                                                              [   ] Min  A          DISCHARGE PLAN:   [   ]  Good candidate for Intensive Rehabilitation/Hospital based                                             Will tolerate 3hrs Intensive Rehab Daily                                       [ x   ]  Short Term Rehab in Skilled Nursing Facility                       vs                [ x   ]  Home with Outpatient or VN services                                         [    ]  Possible Candidate for Intensive Hospital based Rehab
69 y/o F with h/o CAD s/p CABG, ICM, chronic systolic heart failure admitted with acute leg pain and rash. DVT ruled out. Patient has large pleural effusion. Volume status has improved compared to last week, except for pleural effusion

## 2019-11-14 LAB
ANION GAP SERPL CALC-SCNC: 13 MMOL/L — SIGNIFICANT CHANGE UP (ref 7–14)
ANION GAP SERPL CALC-SCNC: 13 MMOL/L — SIGNIFICANT CHANGE UP (ref 7–14)
BUN SERPL-MCNC: 44 MG/DL — HIGH (ref 10–20)
BUN SERPL-MCNC: 46 MG/DL — HIGH (ref 10–20)
CALCIUM SERPL-MCNC: 9.2 MG/DL — SIGNIFICANT CHANGE UP (ref 8.5–10.1)
CALCIUM SERPL-MCNC: 9.4 MG/DL — SIGNIFICANT CHANGE UP (ref 8.5–10.1)
CHLORIDE SERPL-SCNC: 111 MMOL/L — HIGH (ref 98–110)
CHLORIDE SERPL-SCNC: 116 MMOL/L — HIGH (ref 98–110)
CO2 SERPL-SCNC: 22 MMOL/L — SIGNIFICANT CHANGE UP (ref 17–32)
CO2 SERPL-SCNC: 24 MMOL/L — SIGNIFICANT CHANGE UP (ref 17–32)
CREAT SERPL-MCNC: 1 MG/DL — SIGNIFICANT CHANGE UP (ref 0.7–1.5)
CREAT SERPL-MCNC: 1.1 MG/DL — SIGNIFICANT CHANGE UP (ref 0.7–1.5)
GLUCOSE BLDC GLUCOMTR-MCNC: 101 MG/DL — HIGH (ref 70–99)
GLUCOSE BLDC GLUCOMTR-MCNC: 103 MG/DL — HIGH (ref 70–99)
GLUCOSE BLDC GLUCOMTR-MCNC: 143 MG/DL — HIGH (ref 70–99)
GLUCOSE BLDC GLUCOMTR-MCNC: 156 MG/DL — HIGH (ref 70–99)
GLUCOSE BLDC GLUCOMTR-MCNC: 86 MG/DL — SIGNIFICANT CHANGE UP (ref 70–99)
GLUCOSE SERPL-MCNC: 118 MG/DL — HIGH (ref 70–99)
GLUCOSE SERPL-MCNC: 160 MG/DL — HIGH (ref 70–99)
HCT VFR BLD CALC: 30.6 % — LOW (ref 37–47)
HCT VFR BLD CALC: 32.1 % — LOW (ref 37–47)
HGB BLD-MCNC: 10.2 G/DL — LOW (ref 12–16)
HGB BLD-MCNC: 9.7 G/DL — LOW (ref 12–16)
MAGNESIUM SERPL-MCNC: 1.8 MG/DL — SIGNIFICANT CHANGE UP (ref 1.8–2.4)
MAGNESIUM SERPL-MCNC: 1.9 MG/DL — SIGNIFICANT CHANGE UP (ref 1.8–2.4)
MCHC RBC-ENTMCNC: 27.9 PG — SIGNIFICANT CHANGE UP (ref 27–31)
MCHC RBC-ENTMCNC: 28.4 PG — SIGNIFICANT CHANGE UP (ref 27–31)
MCHC RBC-ENTMCNC: 31.7 G/DL — LOW (ref 32–37)
MCHC RBC-ENTMCNC: 31.8 G/DL — LOW (ref 32–37)
MCV RBC AUTO: 87.9 FL — SIGNIFICANT CHANGE UP (ref 81–99)
MCV RBC AUTO: 89.7 FL — SIGNIFICANT CHANGE UP (ref 81–99)
NRBC # BLD: 0 /100 WBCS — SIGNIFICANT CHANGE UP (ref 0–0)
NRBC # BLD: 0 /100 WBCS — SIGNIFICANT CHANGE UP (ref 0–0)
PLATELET # BLD AUTO: 223 K/UL — SIGNIFICANT CHANGE UP (ref 130–400)
PLATELET # BLD AUTO: 250 K/UL — SIGNIFICANT CHANGE UP (ref 130–400)
POTASSIUM SERPL-MCNC: 4 MMOL/L — SIGNIFICANT CHANGE UP (ref 3.5–5)
POTASSIUM SERPL-MCNC: 4.4 MMOL/L — SIGNIFICANT CHANGE UP (ref 3.5–5)
POTASSIUM SERPL-SCNC: 4 MMOL/L — SIGNIFICANT CHANGE UP (ref 3.5–5)
POTASSIUM SERPL-SCNC: 4.4 MMOL/L — SIGNIFICANT CHANGE UP (ref 3.5–5)
RBC # BLD: 3.41 M/UL — LOW (ref 4.2–5.4)
RBC # BLD: 3.65 M/UL — LOW (ref 4.2–5.4)
RBC # FLD: 16.9 % — HIGH (ref 11.5–14.5)
RBC # FLD: 17.3 % — HIGH (ref 11.5–14.5)
SODIUM SERPL-SCNC: 148 MMOL/L — HIGH (ref 135–146)
SODIUM SERPL-SCNC: 151 MMOL/L — HIGH (ref 135–146)
WBC # BLD: 6.8 K/UL — SIGNIFICANT CHANGE UP (ref 4.8–10.8)
WBC # BLD: 6.92 K/UL — SIGNIFICANT CHANGE UP (ref 4.8–10.8)
WBC # FLD AUTO: 6.8 K/UL — SIGNIFICANT CHANGE UP (ref 4.8–10.8)
WBC # FLD AUTO: 6.92 K/UL — SIGNIFICANT CHANGE UP (ref 4.8–10.8)

## 2019-11-14 PROCEDURE — 93970 EXTREMITY STUDY: CPT | Mod: 26

## 2019-11-14 PROCEDURE — 71045 X-RAY EXAM CHEST 1 VIEW: CPT | Mod: 26

## 2019-11-14 PROCEDURE — 99024 POSTOP FOLLOW-UP VISIT: CPT

## 2019-11-14 RX ORDER — HYDROMORPHONE HYDROCHLORIDE 2 MG/ML
1 INJECTION INTRAMUSCULAR; INTRAVENOUS; SUBCUTANEOUS ONCE
Refills: 0 | Status: DISCONTINUED | OUTPATIENT
Start: 2019-11-14 | End: 2019-11-15

## 2019-11-14 RX ORDER — BUMETANIDE 0.25 MG/ML
1 INJECTION INTRAMUSCULAR; INTRAVENOUS DAILY
Refills: 0 | Status: DISCONTINUED | OUTPATIENT
Start: 2019-11-14 | End: 2019-11-15

## 2019-11-14 RX ORDER — LIDOCAINE HCL 20 MG/ML
20 VIAL (ML) INJECTION ONCE
Refills: 0 | Status: COMPLETED | OUTPATIENT
Start: 2019-11-14 | End: 2019-11-15

## 2019-11-14 RX ADMIN — NYSTATIN CREAM 1 APPLICATION(S): 100000 CREAM TOPICAL at 18:08

## 2019-11-14 RX ADMIN — PANTOPRAZOLE SODIUM 40 MILLIGRAM(S): 20 TABLET, DELAYED RELEASE ORAL at 06:16

## 2019-11-14 RX ADMIN — Medication 81 MILLIGRAM(S): at 11:25

## 2019-11-14 RX ADMIN — SERTRALINE 25 MILLIGRAM(S): 25 TABLET, FILM COATED ORAL at 11:24

## 2019-11-14 RX ADMIN — Medication 500 MILLIGRAM(S): at 11:25

## 2019-11-14 RX ADMIN — CARVEDILOL PHOSPHATE 9.38 MILLIGRAM(S): 80 CAPSULE, EXTENDED RELEASE ORAL at 18:09

## 2019-11-14 RX ADMIN — HEPARIN SODIUM 5000 UNIT(S): 5000 INJECTION INTRAVENOUS; SUBCUTANEOUS at 05:07

## 2019-11-14 RX ADMIN — SODIUM CHLORIDE 3 MILLILITER(S): 9 INJECTION INTRAMUSCULAR; INTRAVENOUS; SUBCUTANEOUS at 13:09

## 2019-11-14 RX ADMIN — Medication 650 MILLIGRAM(S): at 17:05

## 2019-11-14 RX ADMIN — SACUBITRIL AND VALSARTAN 1 TABLET(S): 24; 26 TABLET, FILM COATED ORAL at 18:08

## 2019-11-14 RX ADMIN — METFORMIN HYDROCHLORIDE 1000 MILLIGRAM(S): 850 TABLET ORAL at 06:16

## 2019-11-14 RX ADMIN — MIRTAZAPINE 7.5 MILLIGRAM(S): 45 TABLET, ORALLY DISINTEGRATING ORAL at 22:02

## 2019-11-14 RX ADMIN — SACUBITRIL AND VALSARTAN 1 TABLET(S): 24; 26 TABLET, FILM COATED ORAL at 05:08

## 2019-11-14 RX ADMIN — NYSTATIN CREAM 1 APPLICATION(S): 100000 CREAM TOPICAL at 05:07

## 2019-11-14 RX ADMIN — Medication 650 MILLIGRAM(S): at 16:35

## 2019-11-14 RX ADMIN — MEGESTROL ACETATE 800 MILLIGRAM(S): 40 SUSPENSION ORAL at 11:24

## 2019-11-14 RX ADMIN — CARVEDILOL PHOSPHATE 9.38 MILLIGRAM(S): 80 CAPSULE, EXTENDED RELEASE ORAL at 05:07

## 2019-11-14 RX ADMIN — SODIUM CHLORIDE 3 MILLILITER(S): 9 INJECTION INTRAMUSCULAR; INTRAVENOUS; SUBCUTANEOUS at 22:30

## 2019-11-14 RX ADMIN — BUMETANIDE 1 MILLIGRAM(S): 0.25 INJECTION INTRAMUSCULAR; INTRAVENOUS at 09:44

## 2019-11-14 RX ADMIN — Medication 3 MILLILITER(S): at 07:54

## 2019-11-14 RX ADMIN — ZINC SULFATE TAB 220 MG (50 MG ZINC EQUIVALENT) 220 MILLIGRAM(S): 220 (50 ZN) TAB at 11:24

## 2019-11-14 RX ADMIN — METFORMIN HYDROCHLORIDE 1000 MILLIGRAM(S): 850 TABLET ORAL at 18:08

## 2019-11-14 RX ADMIN — SODIUM CHLORIDE 3 MILLILITER(S): 9 INJECTION INTRAMUSCULAR; INTRAVENOUS; SUBCUTANEOUS at 07:22

## 2019-11-14 RX ADMIN — HEPARIN SODIUM 5000 UNIT(S): 5000 INJECTION INTRAVENOUS; SUBCUTANEOUS at 22:03

## 2019-11-14 RX ADMIN — ATORVASTATIN CALCIUM 40 MILLIGRAM(S): 80 TABLET, FILM COATED ORAL at 22:02

## 2019-11-14 RX ADMIN — HEPARIN SODIUM 5000 UNIT(S): 5000 INJECTION INTRAVENOUS; SUBCUTANEOUS at 18:07

## 2019-11-14 NOTE — PROGRESS NOTE ADULT - SUBJECTIVE AND OBJECTIVE BOX
OPERATIVE PROCEDURE(s):                POD #                       68yFemale  SURGEON(s): CHINA Escobar  SUBJECTIVE ASSESSMENT:   Vital Signs Last 24 Hrs  T(F): 98.4 (13 Nov 2019 20:00), Max: 98.4 (13 Nov 2019 20:00)  HR: 78 (14 Nov 2019 07:00) (72 - 90)  BP: 130/65 (14 Nov 2019 07:00) (122/61 - 159/82)  BP(mean): 90 (14 Nov 2019 07:00) (86 - 113)  RR: 18 (14 Nov 2019 07:00) (17 - 26)  SpO2: 97% (14 Nov 2019 07:00) (95% - 99%)      I&O's Detail    13 Nov 2019 07:01  -  14 Nov 2019 07:00  --------------------------------------------------------  IN:    Oral Fluid: 630 mL  Total IN: 630 mL    OUT:    Chest Tube: 535 mL    Indwelling Catheter - Urethral: 1820 mL  Total OUT: 2355 mL        Net:   I&O's Detail    12 Nov 2019 07:01  -  13 Nov 2019 07:00  --------------------------------------------------------  Total NET: -1282 mL      13 Nov 2019 07:01  -  14 Nov 2019 07:00  --------------------------------------------------------  Total NET: -1725 mL        CAPILLARY BLOOD GLUCOSE  136 (13 Nov 2019 17:00)      POCT Blood Glucose.: 101 mg/dL (14 Nov 2019 06:57)  POCT Blood Glucose.: 103 mg/dL (14 Nov 2019 02:23)  POCT Blood Glucose.: 136 mg/dL (13 Nov 2019 16:52)  POCT Blood Glucose.: 97 mg/dL (13 Nov 2019 10:45)    Physical Exam:  General: NAD; A&Ox3/Patient is intubated and sedated  Cardiac: S1/S2, RRR, no murmur, no rubs  Lungs: unlabored respirations, CTA b/l, no wheeze, no rales, no crackles  Abdomen: Soft/NT/ND; positive bowel sounds x 4  Sternum: Intact, no click, incision healing well with no drainage  Incisions: Incisions clean/dry/intact  Extremities: No edema b/l lower extremities; good capillary refill; no cyanosis; palpable 1+ pedal pulses b/l    Central Venous Catheter: Yes[]  No[] , If Yes indication:           Day #  [place] Catheter: Yes  [] , No  [] , If yes indication:                      Day #  NGT: Yes [] No [] ,    If Yes Placement:                                     Day #  EPICARDIAL WIRES:  [] YES [] NO                                              Day #  BOWEL MOVEMENT:  [] YES [] NO, If No, Timing since last BM:      Day #  CHEST TUBE(Left/Right):  [] YES [] NO, If yes -  AIR LEAKS:  [] YES [] NO        LABS:                        10.2<L>  6.92  )-----------( 250      ( 14 Nov 2019 02:40 )             32.1<L>                        10.2<L>  6.25  )-----------( 237      ( 13 Nov 2019 03:20 )             31.3<L>    11-14    148<H>  |  111<H>  |  46<H>  ----------------------------<  118<H>  4.0   |  24  |  1.0  11-13    144  |  109  |  60<H>  ----------------------------<  101<H>  3.7   |  22  |  1.2    Ca    9.4      14 Nov 2019 02:40  Mg     1.9     11-14            RADIOLOGY & ADDITIONAL TESTS:  CXR:  EKG:  MEDICATIONS  (STANDING):  albuterol/ipratropium for Nebulization 3 milliLiter(s) Nebulizer every 6 hours  ascorbic acid 500 milliGRAM(s) Oral daily  aspirin  chewable 81 milliGRAM(s) Oral daily  atorvastatin 40 milliGRAM(s) Oral at bedtime  carvedilol 9.375 milliGRAM(s) Oral every 12 hours  dextrose 5%. 1000 milliLiter(s) (50 mL/Hr) IV Continuous <Continuous>  dextrose 50% Injectable 12.5 Gram(s) IV Push once  dextrose 50% Injectable 25 Gram(s) IV Push once  dextrose 50% Injectable 25 Gram(s) IV Push once  heparin  Injectable 5000 Unit(s) SubCutaneous every 8 hours  insulin lispro (HumaLOG) corrective regimen sliding scale   SubCutaneous three times a day before meals  megestrol Suspension 800 milliGRAM(s) Oral daily  metFORMIN 1000 milliGRAM(s) Oral every 12 hours  mirtazapine 7.5 milliGRAM(s) Oral at bedtime  nystatin Cream 1 Application(s) Topical two times a day  pantoprazole    Tablet 40 milliGRAM(s) Oral before breakfast  sacubitril 49 mG/valsartan 51 mG 1 Tablet(s) Oral two times a day  sertraline 25 milliGRAM(s) Oral daily  sodium chloride 0.9% lock flush 3 milliLiter(s) IV Push every 8 hours  sodium chloride 0.9%. 1000 milliLiter(s) (10 mL/Hr) IV Continuous <Continuous>  tiotropium 18 MICROgram(s) Capsule 1 Capsule(s) Inhalation daily  zinc sulfate 220 milliGRAM(s) Oral daily    MEDICATIONS  (PRN):  acetaminophen   Tablet .. 650 milliGRAM(s) Oral every 6 hours PRN Mild Pain (1 - 3)  dextrose 40% Gel 15 Gram(s) Oral once PRN Blood Glucose LESS THAN 70 milliGRAM(s)/deciliter  doxycycline Intrapleural Syringe 500 milliGRAM(s) IntraPleural. once PRN to bedside for procedure  glucagon  Injectable 1 milliGRAM(s) IntraMuscular once PRN Glucose LESS THAN 70 milligrams/deciliter  HYDROmorphone  Injectable 1 milliGRAM(s) IV Push once PRN to bedside for procedure  lidocaine 1% Injectable 20 milliLiter(s) Local Injection once PRN to bedside for procedure  oxyCODONE    IR 5 milliGRAM(s) Oral at bedtime PRN Mild Pain (1 - 3)    HEPARIN:  [] YES [] NO  Dose: XX UNITS/HR UNITS Q8H  LOVENOX:[] YES [] NO  Dose: XX mg Q24H  COUMADIN: []  YES [] NO  Dose: XX mg  Q24H  SCD's: YES b/l  GI Prophylaxis: Protonix [], Pepcid [], None [], (Contra-indication:.....)    Post-Op Beta-Blockers: Yes [], No[], If No, then contraindication:  Post-Op Aspirin: Yes [],  No [], If No, then contraindication:  Post-Op Statin: Yes [], No[], If No, then contraindication:  Allergies    No Known Allergies    Intolerances      Ambulation/Activity Status:    Assessment/Plan:  68y Female status-post .....  - Case and plan discussed with CTU Intensivist and CT Surgeon - Dr. Urbano/Flaco/Shawn   - Continue CTU supportive care    - Continue DVT/GI prophylaxis  - Incentive Spirometry 10 times an hour  - Continue to advance physical activity as tolerated and continue PT/OT as directed  1. CAD: Continue ASA, statin, BB  2. HTN:   3. A. Fib:   4. COPD/Hypoxia:   5. DM/Glucose Control:     Social Service Disposition: OPERATIVE PROCEDURE(s): CABGx3 readmit with CHF exacerbation                POD #                       68yFemale  SURGEON(s): CHINA Escobar  SUBJECTIVE ASSESSMENT: pt seen and examined. no acute complaints at this time  Vital Signs Last 24 Hrs  T(F): 98.4 (13 Nov 2019 20:00), Max: 98.4 (13 Nov 2019 20:00)  HR: 78 (14 Nov 2019 07:00) (72 - 90)  BP: 130/65 (14 Nov 2019 07:00) (122/61 - 159/82)  BP(mean): 90 (14 Nov 2019 07:00) (86 - 113)  RR: 18 (14 Nov 2019 07:00) (17 - 26)  SpO2: 97% (14 Nov 2019 07:00) (95% - 99%)      I&O's Detail    13 Nov 2019 07:01  -  14 Nov 2019 07:00  --------------------------------------------------------  IN:    Oral Fluid: 630 mL  Total IN: 630 mL    OUT:    Chest Tube: 535 mL    Indwelling Catheter - Urethral: 1820 mL  Total OUT: 2355 mL        Net:   I&O's Detail    12 Nov 2019 07:01  -  13 Nov 2019 07:00  --------------------------------------------------------  Total NET: -1282 mL      13 Nov 2019 07:01  -  14 Nov 2019 07:00  --------------------------------------------------------  Total NET: -1725 mL        CAPILLARY BLOOD GLUCOSE  136 (13 Nov 2019 17:00)      POCT Blood Glucose.: 101 mg/dL (14 Nov 2019 06:57)  POCT Blood Glucose.: 103 mg/dL (14 Nov 2019 02:23)  POCT Blood Glucose.: 136 mg/dL (13 Nov 2019 16:52)  POCT Blood Glucose.: 97 mg/dL (13 Nov 2019 10:45)    Physical Exam:  General: NAD; A&Ox3  Cardiac: S1/S2, RRR, no murmur, no rubs  Lungs: decreased bs at right bases, crackles bilateral  Abdomen: Soft/NT/ND; positive bowel sounds x 4  Sternum: Intact, no click, incision healing well with no drainage  Incisions: Incisions clean/dry/intact  Extremities: 2+ edema b/l lower extremities; good capillary refill; no cyanosis; palpable 1+ pedal pulses b/l    Central Venous Catheter: Yes[]  No[x] , If Yes indication:           Day #  [place] Catheter: Yes  [x] , No  [] , If yes indication:                    prolapse bladder                Day # chronic mon[place]jo  NGT: Yes [] No [x] ,    If Yes Placement:                                     Day #  EPICARDIAL WIRES:  [] YES [x] NO                                              Day #  BOWEL MOVEMENT:  [x] YES [] NO, If No, Timing since last BM:      Day #  CHEST TUBE(Left/Right):  [] YES [x] NO, If yes -  AIR LEAKS:  [] YES [] NO    LABS:                        10.2<L>  6.92  )-----------( 250      ( 14 Nov 2019 02:40 )             32.1<L>                        10.2<L>  6.25  )-----------( 237      ( 13 Nov 2019 03:20 )             31.3<L>    11-14    148<H>  |  111<H>  |  46<H>  ----------------------------<  118<H>  4.0   |  24  |  1.0  11-13    144  |  109  |  60<H>  ----------------------------<  101<H>  3.7   |  22  |  1.2    Ca    9.4      14 Nov 2019 02:40  Mg     1.9     11-14            RADIOLOGY & ADDITIONAL TESTS:  CXR: < from: Xray Chest 1 View- PORTABLE-Routine (11.13.19 @ 04:38) >  IMPRESSION:     Increased left pleural effusion/opacity. Persistent pulmonary vascular   congestion.    < end of copied text >    EKG:   MEDICATIONS  (STANDING):  albuterol/ipratropium for Nebulization 3 milliLiter(s) Nebulizer every 6 hours  ascorbic acid 500 milliGRAM(s) Oral daily  aspirin  chewable 81 milliGRAM(s) Oral daily  atorvastatin 40 milliGRAM(s) Oral at bedtime  carvedilol 9.375 milliGRAM(s) Oral every 12 hours  dextrose 5%. 1000 milliLiter(s) (50 mL/Hr) IV Continuous <Continuous>  dextrose 50% Injectable 12.5 Gram(s) IV Push once  dextrose 50% Injectable 25 Gram(s) IV Push once  dextrose 50% Injectable 25 Gram(s) IV Push once  heparin  Injectable 5000 Unit(s) SubCutaneous every 8 hours  insulin lispro (HumaLOG) corrective regimen sliding scale   SubCutaneous three times a day before meals  megestrol Suspension 800 milliGRAM(s) Oral daily  metFORMIN 1000 milliGRAM(s) Oral every 12 hours  mirtazapine 7.5 milliGRAM(s) Oral at bedtime  nystatin Cream 1 Application(s) Topical two times a day  pantoprazole    Tablet 40 milliGRAM(s) Oral before breakfast  sacubitril 49 mG/valsartan 51 mG 1 Tablet(s) Oral two times a day  sertraline 25 milliGRAM(s) Oral daily  sodium chloride 0.9% lock flush 3 milliLiter(s) IV Push every 8 hours  sodium chloride 0.9%. 1000 milliLiter(s) (10 mL/Hr) IV Continuous <Continuous>  tiotropium 18 MICROgram(s) Capsule 1 Capsule(s) Inhalation daily  zinc sulfate 220 milliGRAM(s) Oral daily    MEDICATIONS  (PRN):  acetaminophen   Tablet .. 650 milliGRAM(s) Oral every 6 hours PRN Mild Pain (1 - 3)  dextrose 40% Gel 15 Gram(s) Oral once PRN Blood Glucose LESS THAN 70 milliGRAM(s)/deciliter  doxycycline Intrapleural Syringe 500 milliGRAM(s) IntraPleural. once PRN to bedside for procedure  glucagon  Injectable 1 milliGRAM(s) IntraMuscular once PRN Glucose LESS THAN 70 milligrams/deciliter  HYDROmorphone  Injectable 1 milliGRAM(s) IV Push once PRN to bedside for procedure  lidocaine 1% Injectable 20 milliLiter(s) Local Injection once PRN to bedside for procedure  oxyCODONE    IR 5 milliGRAM(s) Oral at bedtime PRN Mild Pain (1 - 3)    HEPARIN:  [x] YES [] NO  Dose: 5000 UNITS Q8H  LOVENOX:[] YES [x] NO  Dose: XX mg Q24H  COUMADIN: []  YES [x] NO  Dose: XX mg  Q24H  SCD's: YES b/l  GI Prophylaxis: Protonix [x], Pepcid [], None [], (Contra-indication:.....)    Post-Op Beta-Blockers: Yes [x], No[], If No, then contraindication:  Post-Op Aspirin: Yes [x],  No [], If No, then contraindication:  Post-Op Statin: Yes [x], No[], If No, then contraindication:  Allergies    No Known Allergies    Intolerances      Ambulation/Activity Status: ambulate    Assessment/Plan:  68y Female status-post 68y Female status-post CABGx3 on 10/7/2019 readmit with right pleural effusion, most likely secondary to heart failure  - Case and plan discussed with CTU Intensivist and CT Surgeon - Dr. Urbano/Flaco/Shawn   - Continue CTU supportive care    - Continue DVT/GI prophylaxis  - Incentive Spirometry 10 times an hour  - Continue to advance physical activity as tolerated and continue PT/OT as directed  1. CAD: Continue ASA, statin, BB  2. chronic systolic heart failure- cont coreg, imdur and entresto, bumex, increase coreg if sbp is >110  3. COPD/Hypoxia: nebs, wean o2 as tolerated  4. poor appetite: cont megace, calorie count, prealbumin is 26, cont to encourage po intake  5. DM/Glucose Control: cont lantus 20 humalog sliding scale  6. right pleural effusion with right pigtail in place- poss pleurodesis today    Social Service Disposition:  home OPERATIVE PROCEDURE(s): CABGx3 readmit with CHF exacerbation                POD #                       68yFemale  SURGEON(s): CHINA Escobar  SUBJECTIVE ASSESSMENT: pt seen and examined. no acute complaints at this time  Vital Signs Last 24 Hrs  T(F): 98.4 (13 Nov 2019 20:00), Max: 98.4 (13 Nov 2019 20:00)  HR: 78 (14 Nov 2019 07:00) (72 - 90)  BP: 130/65 (14 Nov 2019 07:00) (122/61 - 159/82)  BP(mean): 90 (14 Nov 2019 07:00) (86 - 113)  RR: 18 (14 Nov 2019 07:00) (17 - 26)  SpO2: 97% (14 Nov 2019 07:00) (95% - 99%)    I&O's Detail    13 Nov 2019 07:01  -  14 Nov 2019 07:00  --------------------------------------------------------  IN:    Oral Fluid: 630 mL  Total IN: 630 mL    OUT:    Chest Tube: 535 mL    Indwelling Catheter - Urethral: 1820 mL  Total OUT: 2355 mL    Net:   I&O's Detail    12 Nov 2019 07:01  -  13 Nov 2019 07:00  --------------------------------------------------------  Total NET: -1282 mL      13 Nov 2019 07:01  -  14 Nov 2019 07:00  --------------------------------------------------------  Total NET: -1725 mL    CAPILLARY BLOOD GLUCOSE  136 (13 Nov 2019 17:00)  POCT Blood Glucose.: 101 mg/dL (14 Nov 2019 06:57)  POCT Blood Glucose.: 103 mg/dL (14 Nov 2019 02:23)  POCT Blood Glucose.: 136 mg/dL (13 Nov 2019 16:52)  POCT Blood Glucose.: 97 mg/dL (13 Nov 2019 10:45)    Physical Exam:  General: NAD; A&Ox3  Cardiac: S1/S2, RRR, no murmur, no rubs  Lungs: decreased bs at right bases, crackles bilateral  Abdomen: Soft/NT/ND; positive bowel sounds x 4  Sternum: Intact, no click, incision healing well with no drainage  Incisions: Incisions clean/dry/intact  Extremities: 2+ edema b/l lower extremities; good capillary refill; no cyanosis; palpable 1+ pedal pulses b/l    LABS:                        10.2<L>  6.92  )-----------( 250      ( 14 Nov 2019 02:40 )             32.1<L>                        10.2<L>  6.25  )-----------( 237      ( 13 Nov 2019 03:20 )             31.3<L>    11-14    148<H>  |  111<H>  |  46<H>  ----------------------------<  118<H>  4.0   |  24  |  1.0  11-13    144  |  109  |  60<H>  ----------------------------<  101<H>  3.7   |  22  |  1.2    Ca    9.4      14 Nov 2019 02:40  Mg     1.9     11-14      RADIOLOGY & ADDITIONAL TESTS:  CXR: < from: Xray Chest 1 View- PORTABLE-Routine (11.13.19 @ 04:38) >  IMPRESSION:   Increased left pleural effusion/opacity. Persistent pulmonary vascular   congestion.      EKG:   MEDICATIONS  (STANDING):  albuterol/ipratropium for Nebulization 3 milliLiter(s) Nebulizer every 6 hours  ascorbic acid 500 milliGRAM(s) Oral daily  aspirin  chewable 81 milliGRAM(s) Oral daily  atorvastatin 40 milliGRAM(s) Oral at bedtime  carvedilol 9.375 milliGRAM(s) Oral every 12 hours  heparin  Injectable 5000 Unit(s) SubCutaneous every 8 hours  insulin lispro (HumaLOG) corrective regimen sliding scale   SubCutaneous three times a day before meals  megestrol Suspension 800 milliGRAM(s) Oral daily  metFORMIN 1000 milliGRAM(s) Oral every 12 hours  mirtazapine 7.5 milliGRAM(s) Oral at bedtime  nystatin Cream 1 Application(s) Topical two times a day  pantoprazole    Tablet 40 milliGRAM(s) Oral before breakfast  sacubitril 49 mG/valsartan 51 mG 1 Tablet(s) Oral two times a day  sertraline 25 milliGRAM(s) Oral daily  tiotropium 18 MICROgram(s) Capsule 1 Capsule(s) Inhalation daily  zinc sulfate 220 milliGRAM(s) Oral daily    MEDICATIONS  (PRN):  acetaminophen   Tablet .. 650 milliGRAM(s) Oral every 6 hours PRN Mild Pain (1 - 3)  dextrose 40% Gel 15 Gram(s) Oral once PRN Blood Glucose LESS THAN 70 milliGRAM(s)/deciliter  doxycycline Intrapleural Syringe 500 milliGRAM(s) IntraPleural. once PRN to bedside for procedure  glucagon  Injectable 1 milliGRAM(s) IntraMuscular once PRN Glucose LESS THAN 70 milligrams/deciliter  HYDROmorphone  Injectable 1 milliGRAM(s) IV Push once PRN to bedside for procedure  lidocaine 1% Injectable 20 milliLiter(s) Local Injection once PRN to bedside for procedure  oxyCODONE    IR 5 milliGRAM(s) Oral at bedtime PRN Mild Pain (1 - 3)    HEPARIN:  [x] YES [] NO  Dose: 5000 UNITS Q8H  SCD's: YES b/l    GI Prophylaxis: Protonix [x], Pepcid [], None [], (Contra-indication:.....)    Post-Op Beta-Blockers: Yes [x], No[], If No, then contraindication:  Post-Op Aspirin: Yes [x],  No [], If No, then contraindication:  Post-Op Statin: Yes [x], No[], If No, then contraindication:    Assessment/Plan:  68y Female status-post 68y Female status-post CABGx3 on 10/7/2019 readmit with right pleural effusion, most likely secondary to heart failure  - Case and plan discussed with CTU Intensivist and CT Surgeon - Dr. Urbano/Flaco/Shawn   - Continue CTU supportive care    - Continue DVT/GI prophylaxis  - Incentive Spirometry 10 times an hour  - Continue to advance physical activity as tolerated and continue PT/OT as directed  1. CAD: Continue ASA, statin, BB  2. chronic systolic heart failure- cont coreg, imdur and entresto, bumex, increase coreg if sbp is >110  3. COPD/Hypoxia: nebs, wean o2 as tolerated  4. poor appetite: cont megace, calorie count, prealbumin is 26, cont to encourage po intake  5. DM/Glucose Control: cont lantus 20 humalog sliding scale  6. right pleural effusion with right pigtail in place- poss pleurodesis today    Social Service Disposition:  home

## 2019-11-15 LAB
GLUCOSE BLDC GLUCOMTR-MCNC: 100 MG/DL — HIGH (ref 70–99)
GLUCOSE BLDC GLUCOMTR-MCNC: 115 MG/DL — HIGH (ref 70–99)
GLUCOSE BLDC GLUCOMTR-MCNC: 133 MG/DL — HIGH (ref 70–99)
GLUCOSE BLDC GLUCOMTR-MCNC: 148 MG/DL — HIGH (ref 70–99)
GLUCOSE BLDC GLUCOMTR-MCNC: 161 MG/DL — HIGH (ref 70–99)

## 2019-11-15 PROCEDURE — 99232 SBSQ HOSP IP/OBS MODERATE 35: CPT

## 2019-11-15 PROCEDURE — 71045 X-RAY EXAM CHEST 1 VIEW: CPT | Mod: 26

## 2019-11-15 PROCEDURE — 99024 POSTOP FOLLOW-UP VISIT: CPT

## 2019-11-15 RX ORDER — CARVEDILOL PHOSPHATE 80 MG/1
12.5 CAPSULE, EXTENDED RELEASE ORAL EVERY 12 HOURS
Refills: 0 | Status: DISCONTINUED | OUTPATIENT
Start: 2019-11-15 | End: 2019-11-16

## 2019-11-15 RX ORDER — FUROSEMIDE 40 MG
20 TABLET ORAL DAILY
Refills: 0 | Status: DISCONTINUED | OUTPATIENT
Start: 2019-11-15 | End: 2019-11-19

## 2019-11-15 RX ORDER — SPIRONOLACTONE 25 MG/1
25 TABLET, FILM COATED ORAL DAILY
Refills: 0 | Status: DISCONTINUED | OUTPATIENT
Start: 2019-11-15 | End: 2019-11-16

## 2019-11-15 RX ORDER — FENTANYL CITRATE 50 UG/ML
25 INJECTION INTRAVENOUS ONCE
Refills: 0 | Status: DISCONTINUED | OUTPATIENT
Start: 2019-11-15 | End: 2019-11-15

## 2019-11-15 RX ORDER — ACETAMINOPHEN 500 MG
1000 TABLET ORAL ONCE
Refills: 0 | Status: DISCONTINUED | OUTPATIENT
Start: 2019-11-15 | End: 2019-11-19

## 2019-11-15 RX ORDER — ONDANSETRON 8 MG/1
4 TABLET, FILM COATED ORAL EVERY 8 HOURS
Refills: 0 | Status: DISCONTINUED | OUTPATIENT
Start: 2019-11-15 | End: 2019-11-19

## 2019-11-15 RX ADMIN — SERTRALINE 25 MILLIGRAM(S): 25 TABLET, FILM COATED ORAL at 11:55

## 2019-11-15 RX ADMIN — Medication 81 MILLIGRAM(S): at 11:55

## 2019-11-15 RX ADMIN — SACUBITRIL AND VALSARTAN 1 TABLET(S): 24; 26 TABLET, FILM COATED ORAL at 05:37

## 2019-11-15 RX ADMIN — HYDROMORPHONE HYDROCHLORIDE 1 MILLIGRAM(S): 2 INJECTION INTRAMUSCULAR; INTRAVENOUS; SUBCUTANEOUS at 13:32

## 2019-11-15 RX ADMIN — HEPARIN SODIUM 5000 UNIT(S): 5000 INJECTION INTRAVENOUS; SUBCUTANEOUS at 05:37

## 2019-11-15 RX ADMIN — METFORMIN HYDROCHLORIDE 1000 MILLIGRAM(S): 850 TABLET ORAL at 18:52

## 2019-11-15 RX ADMIN — Medication 3 MILLILITER(S): at 14:40

## 2019-11-15 RX ADMIN — NYSTATIN CREAM 1 APPLICATION(S): 100000 CREAM TOPICAL at 05:38

## 2019-11-15 RX ADMIN — ONDANSETRON 4 MILLIGRAM(S): 8 TABLET, FILM COATED ORAL at 13:10

## 2019-11-15 RX ADMIN — METFORMIN HYDROCHLORIDE 1000 MILLIGRAM(S): 850 TABLET ORAL at 05:37

## 2019-11-15 RX ADMIN — Medication 2: at 11:50

## 2019-11-15 RX ADMIN — BUMETANIDE 1 MILLIGRAM(S): 0.25 INJECTION INTRAMUSCULAR; INTRAVENOUS at 05:36

## 2019-11-15 RX ADMIN — MIRTAZAPINE 7.5 MILLIGRAM(S): 45 TABLET, ORALLY DISINTEGRATING ORAL at 21:31

## 2019-11-15 RX ADMIN — MEGESTROL ACETATE 800 MILLIGRAM(S): 40 SUSPENSION ORAL at 11:55

## 2019-11-15 RX ADMIN — HYDROMORPHONE HYDROCHLORIDE 1 MILLIGRAM(S): 2 INJECTION INTRAMUSCULAR; INTRAVENOUS; SUBCUTANEOUS at 12:54

## 2019-11-15 RX ADMIN — SODIUM CHLORIDE 3 MILLILITER(S): 9 INJECTION INTRAMUSCULAR; INTRAVENOUS; SUBCUTANEOUS at 13:13

## 2019-11-15 RX ADMIN — SODIUM CHLORIDE 3 MILLILITER(S): 9 INJECTION INTRAMUSCULAR; INTRAVENOUS; SUBCUTANEOUS at 22:00

## 2019-11-15 RX ADMIN — HEPARIN SODIUM 5000 UNIT(S): 5000 INJECTION INTRAVENOUS; SUBCUTANEOUS at 21:31

## 2019-11-15 RX ADMIN — FENTANYL CITRATE 25 MICROGRAM(S): 50 INJECTION INTRAVENOUS at 14:10

## 2019-11-15 RX ADMIN — Medication 650 MILLIGRAM(S): at 09:30

## 2019-11-15 RX ADMIN — CARVEDILOL PHOSPHATE 12.5 MILLIGRAM(S): 80 CAPSULE, EXTENDED RELEASE ORAL at 18:52

## 2019-11-15 RX ADMIN — Medication 500 MILLIGRAM(S): at 11:55

## 2019-11-15 RX ADMIN — ATORVASTATIN CALCIUM 40 MILLIGRAM(S): 80 TABLET, FILM COATED ORAL at 21:34

## 2019-11-15 RX ADMIN — Medication 20 MILLILITER(S): at 13:00

## 2019-11-15 RX ADMIN — Medication 3 MILLILITER(S): at 08:43

## 2019-11-15 RX ADMIN — SACUBITRIL AND VALSARTAN 1 TABLET(S): 24; 26 TABLET, FILM COATED ORAL at 18:53

## 2019-11-15 RX ADMIN — ZINC SULFATE TAB 220 MG (50 MG ZINC EQUIVALENT) 220 MILLIGRAM(S): 220 (50 ZN) TAB at 11:55

## 2019-11-15 RX ADMIN — HEPARIN SODIUM 5000 UNIT(S): 5000 INJECTION INTRAVENOUS; SUBCUTANEOUS at 14:51

## 2019-11-15 RX ADMIN — CARVEDILOL PHOSPHATE 9.38 MILLIGRAM(S): 80 CAPSULE, EXTENDED RELEASE ORAL at 05:37

## 2019-11-15 RX ADMIN — NYSTATIN CREAM 1 APPLICATION(S): 100000 CREAM TOPICAL at 18:52

## 2019-11-15 RX ADMIN — Medication 500 MILLIGRAM(S): at 13:00

## 2019-11-15 RX ADMIN — SODIUM CHLORIDE 3 MILLILITER(S): 9 INJECTION INTRAMUSCULAR; INTRAVENOUS; SUBCUTANEOUS at 05:38

## 2019-11-15 RX ADMIN — FENTANYL CITRATE 25 MICROGRAM(S): 50 INJECTION INTRAVENOUS at 14:40

## 2019-11-15 RX ADMIN — PANTOPRAZOLE SODIUM 40 MILLIGRAM(S): 20 TABLET, DELAYED RELEASE ORAL at 07:41

## 2019-11-15 NOTE — PROGRESS NOTE ADULT - SUBJECTIVE AND OBJECTIVE BOX
Interval history: Patient feels better today, she was able to sleep last night. Her SOB has improved significantly.       HPI:  69 y/o F with h/o CAD s/p CABG, ICM, chronic systolic heart failure with EF ~ 25% coming today with rash in RLE and pain. Patient can't bear any weight. The pain started suddenly. She also notes some SOB but this is not that significant compared to her baseline. She can walk minimally around the house before getting SOB. She denies PND or orthopnea but has pedal edema. No LOC, palpitations, bleeding episodes.       Patient admitted to CTU and started on broad spectrum Abx, started on iv diuretics. She reports feeling slightly better from her RLE pain.     PSH: CABG    Social: No ETOH or smoking

## 2019-11-15 NOTE — PROGRESS NOTE ADULT - SUBJECTIVE AND OBJECTIVE BOX
CTU Attending Progress Daily Note     15 Nov 2019 08:54    Procedure:                                                  POD#                   Patient seen as post-op critical care follow-up    HPI:  67 y/o F with h/o CAD s/p CABG, ICM, chronic systolic heart failure with EF ~ 25% coming today with rash in RLE and pain. Patient can't bear any weight. The pain started suddenly. She also notes some SOB but this is not that significant compared to her baseline. She can walk minimally around the house before getting SOB. She denies PND or orthopnea but has pedal edema. No LOC, palpitations, bleeding episodes. (03 Nov 2019 07:05)    See preop testing chart H&P    Interval event for past 24 hr:  GUS WILBURN  68y had no event.     Current Complains:  GUS WILBURN has no new complaints    REVIEW OF SYSTEMS:  CONSTITUTIONAL:  [-] weakness, [-] fevers, [-] chills  EYES/ENT: [-] visual changes, [-] vertigo, [-] throat pain   NECK: [-] pain, [-] stiffness  RESPIRATORY: [-] cough, [-] wheezing, [-] hemoptysis, [-] shortness of breath  CARDIOVASCULAR: [-] chest pain, [-] palpitations, [-] orthopnea  GASTROINTESTINAL:    [-]abdominal pain, [-] nausea, [-] vomiting, [-] hematemesis, [-] diarrhea, [-] constipation, [-] melena, [-] hematochezia.  GENITOURINARY: [-] dysuria, [-] frequency, [-] hematuria  NEUROLOGICAL: [-] numbness, [-] weakness  SKIN: [-] itching, [-] burning, [-] rashes, [-] lesions   All other review of systems is negative unless indicated above.    [  ] Unable to assess ROS because :    OBJECTIVE:  ICU Vital Signs Last 24 Hrs  T(C): 36.1 (15 Nov 2019 05:47), Max: 37.3 (14 Nov 2019 19:36)  T(F): 97 (15 Nov 2019 05:47), Max: 99.1 (14 Nov 2019 19:36)  HR: 82 (15 Nov 2019 08:00) (75 - 93)  BP: 127/60 (15 Nov 2019 08:00) (96/56 - 165/80)  BP(mean): 87 (15 Nov 2019 08:00) (71 - 92)  ABP: --  ABP(mean): --  RR: 20 (15 Nov 2019 08:00) (15 - 22)  SpO2: 97% (15 Nov 2019 08:00) (96% - 99%)      I&O's Summary    14 Nov 2019 07:01  -  15 Nov 2019 07:00  --------------------------------------------------------  IN: 730 mL / OUT: 1665 mL / NET: -935 mL          PHYSICAL EXAM:  General: WN/WD NAD    HEENT:     [+] NCAT  [+] EOMI  [-] Conjuctival edema   [-] Icterus   [-] Thrush   [-] ETT  [-] NGT/OGT    Neck:         [+] FROM   [-] JVD     [-] Nodes     [-] Masses    [+] Mid-line trachea    [-] Tracheostomy    Chest:         [-] Sternal click   [-] Sternal drainage    [+] Chest tubes   [-] SubQ emphysema    Lungs:          [+] CTA   [-] Rhonchi   [-] Rales    [-] Wheezing    [-] Decreased BS    [-] Dullness R L    Cardiac:       [+] S1 [+] S2    [+] RRR   [-] Irregular   [-] S3   [-] S4    [-] Murmurs    [-] Rub    Abdomen:    [+] BS    [+] Soft    [+] Non-tender     [-] Distended    [-] Organomegaly  [-] PEG    Extremities:   [-] Cyanosis U/L   [-] Clubbing  U/L  [-] LE/UE Edema   [+] Capillary refill    [+] Pulses     Neuro:        [+] Awake   [+]  Alert   [-] Confused   [-] Lethargic   [-] Sedated   [-] Generalized Weakness    Skin:        [-] Rashes    [-] Erythema   [+] Normal incisions   [+] IV sites intact   [+] Sacral decubitus stage 2    Tubes:  LINES:    CAPILLARY BLOOD GLUCOSE  136 (13 Nov 2019 17:00)      POCT Blood Glucose.: 115 mg/dL (15 Nov 2019 07:51)    CAPILLARY BLOOD GLUCOSE      POCT Blood Glucose.: 115 mg/dL (15 Nov 2019 07:51)  POCT Blood Glucose.: 100 mg/dL (15 Nov 2019 05:34)  POCT Blood Glucose.: 156 mg/dL (14 Nov 2019 22:07)  POCT Blood Glucose.: 143 mg/dL (14 Nov 2019 15:15)  POCT Blood Glucose.: 86 mg/dL (14 Nov 2019 11:21)      HOSPITAL MEDICATIONS:  MEDICATIONS  (STANDING):  albuterol/ipratropium for Nebulization 3 milliLiter(s) Nebulizer every 6 hours  ascorbic acid 500 milliGRAM(s) Oral daily  aspirin  chewable 81 milliGRAM(s) Oral daily  atorvastatin 40 milliGRAM(s) Oral at bedtime  buMETAnide Injectable 1 milliGRAM(s) IV Push daily  carvedilol 9.375 milliGRAM(s) Oral every 12 hours  dextrose 5%. 1000 milliLiter(s) (50 mL/Hr) IV Continuous <Continuous>  dextrose 50% Injectable 12.5 Gram(s) IV Push once  dextrose 50% Injectable 25 Gram(s) IV Push once  dextrose 50% Injectable 25 Gram(s) IV Push once  heparin  Injectable 5000 Unit(s) SubCutaneous every 8 hours  insulin lispro (HumaLOG) corrective regimen sliding scale   SubCutaneous three times a day before meals  megestrol Suspension 800 milliGRAM(s) Oral daily  metFORMIN 1000 milliGRAM(s) Oral every 12 hours  mirtazapine 7.5 milliGRAM(s) Oral at bedtime  nystatin Cream 1 Application(s) Topical two times a day  pantoprazole    Tablet 40 milliGRAM(s) Oral before breakfast  sacubitril 49 mG/valsartan 51 mG 1 Tablet(s) Oral two times a day  sertraline 25 milliGRAM(s) Oral daily  sodium chloride 0.9% lock flush 3 milliLiter(s) IV Push every 8 hours  sodium chloride 0.9%. 1000 milliLiter(s) (10 mL/Hr) IV Continuous <Continuous>  tiotropium 18 MICROgram(s) Capsule 1 Capsule(s) Inhalation daily  zinc sulfate 220 milliGRAM(s) Oral daily    MEDICATIONS  (PRN):  acetaminophen   Tablet .. 650 milliGRAM(s) Oral every 6 hours PRN Mild Pain (1 - 3)  dextrose 40% Gel 15 Gram(s) Oral once PRN Blood Glucose LESS THAN 70 milliGRAM(s)/deciliter  doxycycline Intrapleural Syringe 500 milliGRAM(s) IntraPleural. once PRN to bedside for procedure  glucagon  Injectable 1 milliGRAM(s) IntraMuscular once PRN Glucose LESS THAN 70 milligrams/deciliter  HYDROmorphone  Injectable 1 milliGRAM(s) IV Push once PRN to bedside for procedure  lidocaine 1% Injectable 20 milliLiter(s) Local Injection once PRN to bedside for procedure  oxyCODONE    IR 5 milliGRAM(s) Oral at bedtime PRN Mild Pain (1 - 3)      LABS:                          9.7    6.80  )-----------( 223      ( 14 Nov 2019 22:31 )             30.6     11-14    151<H>  |  116<H>  |  44<H>  ----------------------------<  160<H>  4.4   |  22  |  1.1    Ca    9.2      14 Nov 2019 22:31  Mg     1.8     11-14              RADIOLOGY:  Reviewed and interpreted by me  CXR from 11-15-19 shows [+] mild congestion, [-] pneumothorax, [+] L effusion, [-] cardiomegaly,   Chest Tube in place    ECG:  none    < from: [facility] Duplex Lower Ext Vein Scan, Bilat (11.14.19 @ 14:47) >  Impression:    Venous thrombosis of right peroneal vein branch.    No evidence of deep venous thrombosis or superficial thrombophlebitis in   left lower extremity.    < end of copied text >      Assessment:  Acute systolic CHF  pleural effusion    PAST MEDICAL & SURGICAL HISTORY:  Female bladder prolapse  Diabetes  History of repair of hip fracture      PLAN:  Neuro: Pain control  Pulm: Encourage coughing, deep breathing and use of incentive spirometry. Wean off supplemental oxygen as able. Daily CXR.   Cardio: Monitor telemetry/alarms. Continue cardiac meds  GI: Tolerating diet. Continue stool softeners. Continue GI prophylaxis  Renal: monitor urine output, supplement electrolytes as needed  Vasc: Heparin SC/SCDs for DVT prophylaxis  Heme: Monitor H/H.   ID: Off antibiotics. Stable.  Endocrine: Monitor finger stick blood sugar and control hyperglycemia with insulin  Physical Therapy: OOB/ambulate  Tubes: Monitor Chest tube output      Discussed with Cardiothoracic Team at AM rounds.

## 2019-11-15 NOTE — PROGRESS NOTE ADULT - PROBLEM SELECTOR PLAN 1
ICM s/p CABG   Euvolemic on exam   -120s HR 80s  Continue Entresto 49-51 mg BID   Increase carvedilol to 12.5 mg bid - (Increase to 18.75 mg BID on Sunday if BP > 110s)     Start spironolactone 25 mg daily   Change Bumex 1 mg to furosemide 20 mg daily   Daily weights   Low salt diet   Aggressive PT  Incentive inspirometry    Patient will likely benefit from going to rehab upon discharge  Glucerna  Discussed with CTU and patient

## 2019-11-15 NOTE — CHART NOTE - NSCHARTNOTEFT_GEN_A_CORE
3 day calorie count results    Day 1 ~1074kcal, 61g protein   Day 2 ~294kcal, 20g protein- only 1 meal documented   Day 3 ~650kcal, 38g protein     3 day average 672kcal, 40g protein     Pt. followed by Nutrition Support Team (NST). Defer recs to NST.

## 2019-11-15 NOTE — PROGRESS NOTE ADULT - RESPIRATORY COMMENTS
Decreased respiratory sounds on left base. + Bibasilar crackles
bibasilar crackles
crackles bilateral
Decreased breath sounds on the left base
Decreased respiratory sounds, no crackles heard
Decreased breath sounds bilaterally

## 2019-11-16 LAB
ANION GAP SERPL CALC-SCNC: 11 MMOL/L — SIGNIFICANT CHANGE UP (ref 7–14)
ANION GAP SERPL CALC-SCNC: 13 MMOL/L — SIGNIFICANT CHANGE UP (ref 7–14)
BUN SERPL-MCNC: 45 MG/DL — HIGH (ref 10–20)
BUN SERPL-MCNC: 46 MG/DL — HIGH (ref 10–20)
CALCIUM SERPL-MCNC: 8.9 MG/DL — SIGNIFICANT CHANGE UP (ref 8.5–10.1)
CALCIUM SERPL-MCNC: 9.1 MG/DL — SIGNIFICANT CHANGE UP (ref 8.5–10.1)
CHLORIDE SERPL-SCNC: 111 MMOL/L — HIGH (ref 98–110)
CHLORIDE SERPL-SCNC: 113 MMOL/L — HIGH (ref 98–110)
CO2 SERPL-SCNC: 23 MMOL/L — SIGNIFICANT CHANGE UP (ref 17–32)
CO2 SERPL-SCNC: 23 MMOL/L — SIGNIFICANT CHANGE UP (ref 17–32)
CREAT SERPL-MCNC: 1.3 MG/DL — SIGNIFICANT CHANGE UP (ref 0.7–1.5)
CREAT SERPL-MCNC: 1.3 MG/DL — SIGNIFICANT CHANGE UP (ref 0.7–1.5)
GLUCOSE BLDC GLUCOMTR-MCNC: 111 MG/DL — HIGH (ref 70–99)
GLUCOSE BLDC GLUCOMTR-MCNC: 136 MG/DL — HIGH (ref 70–99)
GLUCOSE BLDC GLUCOMTR-MCNC: 163 MG/DL — HIGH (ref 70–99)
GLUCOSE BLDC GLUCOMTR-MCNC: 195 MG/DL — HIGH (ref 70–99)
GLUCOSE SERPL-MCNC: 119 MG/DL — HIGH (ref 70–99)
GLUCOSE SERPL-MCNC: 203 MG/DL — HIGH (ref 70–99)
HCT VFR BLD CALC: 31.9 % — LOW (ref 37–47)
HGB BLD-MCNC: 9.7 G/DL — LOW (ref 12–16)
MCHC RBC-ENTMCNC: 28.4 PG — SIGNIFICANT CHANGE UP (ref 27–31)
MCHC RBC-ENTMCNC: 30.4 G/DL — LOW (ref 32–37)
MCV RBC AUTO: 93.3 FL — SIGNIFICANT CHANGE UP (ref 81–99)
NRBC # BLD: 0 /100 WBCS — SIGNIFICANT CHANGE UP (ref 0–0)
PLATELET # BLD AUTO: 186 K/UL — SIGNIFICANT CHANGE UP (ref 130–400)
POTASSIUM SERPL-MCNC: 5.2 MMOL/L — HIGH (ref 3.5–5)
POTASSIUM SERPL-MCNC: 5.3 MMOL/L — HIGH (ref 3.5–5)
POTASSIUM SERPL-SCNC: 5.2 MMOL/L — HIGH (ref 3.5–5)
POTASSIUM SERPL-SCNC: 5.3 MMOL/L — HIGH (ref 3.5–5)
RBC # BLD: 3.42 M/UL — LOW (ref 4.2–5.4)
RBC # FLD: 17.6 % — HIGH (ref 11.5–14.5)
SODIUM SERPL-SCNC: 147 MMOL/L — HIGH (ref 135–146)
SODIUM SERPL-SCNC: 147 MMOL/L — HIGH (ref 135–146)
WBC # BLD: 7.77 K/UL — SIGNIFICANT CHANGE UP (ref 4.8–10.8)
WBC # FLD AUTO: 7.77 K/UL — SIGNIFICANT CHANGE UP (ref 4.8–10.8)

## 2019-11-16 PROCEDURE — 71045 X-RAY EXAM CHEST 1 VIEW: CPT | Mod: 26

## 2019-11-16 PROCEDURE — 99232 SBSQ HOSP IP/OBS MODERATE 35: CPT

## 2019-11-16 PROCEDURE — 99024 POSTOP FOLLOW-UP VISIT: CPT

## 2019-11-16 RX ORDER — SODIUM CHLORIDE 9 MG/ML
1000 INJECTION INTRAMUSCULAR; INTRAVENOUS; SUBCUTANEOUS
Refills: 0 | Status: DISCONTINUED | OUTPATIENT
Start: 2019-11-16 | End: 2019-11-19

## 2019-11-16 RX ORDER — CARVEDILOL PHOSPHATE 80 MG/1
6.25 CAPSULE, EXTENDED RELEASE ORAL EVERY 12 HOURS
Refills: 0 | Status: DISCONTINUED | OUTPATIENT
Start: 2019-11-16 | End: 2019-11-18

## 2019-11-16 RX ORDER — SPIRONOLACTONE 25 MG/1
12.5 TABLET, FILM COATED ORAL DAILY
Refills: 0 | Status: DISCONTINUED | OUTPATIENT
Start: 2019-11-16 | End: 2019-11-16

## 2019-11-16 RX ORDER — ALBUTEROL 90 UG/1
2.5 AEROSOL, METERED ORAL EVERY 6 HOURS
Refills: 0 | Status: DISCONTINUED | OUTPATIENT
Start: 2019-11-16 | End: 2019-11-19

## 2019-11-16 RX ORDER — LACTOBACILLUS ACIDOPHILUS 100MM CELL
1 CAPSULE ORAL
Refills: 0 | Status: DISCONTINUED | OUTPATIENT
Start: 2019-11-16 | End: 2019-11-19

## 2019-11-16 RX ADMIN — CARVEDILOL PHOSPHATE 12.5 MILLIGRAM(S): 80 CAPSULE, EXTENDED RELEASE ORAL at 05:54

## 2019-11-16 RX ADMIN — HEPARIN SODIUM 5000 UNIT(S): 5000 INJECTION INTRAVENOUS; SUBCUTANEOUS at 21:08

## 2019-11-16 RX ADMIN — HEPARIN SODIUM 5000 UNIT(S): 5000 INJECTION INTRAVENOUS; SUBCUTANEOUS at 05:55

## 2019-11-16 RX ADMIN — SERTRALINE 25 MILLIGRAM(S): 25 TABLET, FILM COATED ORAL at 13:05

## 2019-11-16 RX ADMIN — MEGESTROL ACETATE 800 MILLIGRAM(S): 40 SUSPENSION ORAL at 13:05

## 2019-11-16 RX ADMIN — METFORMIN HYDROCHLORIDE 1000 MILLIGRAM(S): 850 TABLET ORAL at 18:38

## 2019-11-16 RX ADMIN — SODIUM CHLORIDE 3 MILLILITER(S): 9 INJECTION INTRAMUSCULAR; INTRAVENOUS; SUBCUTANEOUS at 13:06

## 2019-11-16 RX ADMIN — Medication 1 TABLET(S): at 18:37

## 2019-11-16 RX ADMIN — SACUBITRIL AND VALSARTAN 1 TABLET(S): 24; 26 TABLET, FILM COATED ORAL at 18:38

## 2019-11-16 RX ADMIN — PANTOPRAZOLE SODIUM 40 MILLIGRAM(S): 20 TABLET, DELAYED RELEASE ORAL at 06:08

## 2019-11-16 RX ADMIN — ZINC SULFATE TAB 220 MG (50 MG ZINC EQUIVALENT) 220 MILLIGRAM(S): 220 (50 ZN) TAB at 13:05

## 2019-11-16 RX ADMIN — MIRTAZAPINE 7.5 MILLIGRAM(S): 45 TABLET, ORALLY DISINTEGRATING ORAL at 21:08

## 2019-11-16 RX ADMIN — OXYCODONE HYDROCHLORIDE 5 MILLIGRAM(S): 5 TABLET ORAL at 21:08

## 2019-11-16 RX ADMIN — Medication 20 MILLIGRAM(S): at 05:54

## 2019-11-16 RX ADMIN — METFORMIN HYDROCHLORIDE 1000 MILLIGRAM(S): 850 TABLET ORAL at 05:53

## 2019-11-16 RX ADMIN — Medication 2: at 16:47

## 2019-11-16 RX ADMIN — HEPARIN SODIUM 5000 UNIT(S): 5000 INJECTION INTRAVENOUS; SUBCUTANEOUS at 14:20

## 2019-11-16 RX ADMIN — SODIUM CHLORIDE 3 MILLILITER(S): 9 INJECTION INTRAMUSCULAR; INTRAVENOUS; SUBCUTANEOUS at 21:30

## 2019-11-16 RX ADMIN — NYSTATIN CREAM 1 APPLICATION(S): 100000 CREAM TOPICAL at 05:56

## 2019-11-16 RX ADMIN — Medication 500 MILLIGRAM(S): at 13:05

## 2019-11-16 RX ADMIN — SPIRONOLACTONE 25 MILLIGRAM(S): 25 TABLET, FILM COATED ORAL at 05:55

## 2019-11-16 RX ADMIN — Medication 81 MILLIGRAM(S): at 13:05

## 2019-11-16 RX ADMIN — OXYCODONE HYDROCHLORIDE 5 MILLIGRAM(S): 5 TABLET ORAL at 21:38

## 2019-11-16 RX ADMIN — NYSTATIN CREAM 1 APPLICATION(S): 100000 CREAM TOPICAL at 18:36

## 2019-11-16 RX ADMIN — CARVEDILOL PHOSPHATE 6.25 MILLIGRAM(S): 80 CAPSULE, EXTENDED RELEASE ORAL at 20:36

## 2019-11-16 RX ADMIN — ATORVASTATIN CALCIUM 40 MILLIGRAM(S): 80 TABLET, FILM COATED ORAL at 21:08

## 2019-11-16 RX ADMIN — SODIUM CHLORIDE 3 MILLILITER(S): 9 INJECTION INTRAMUSCULAR; INTRAVENOUS; SUBCUTANEOUS at 05:58

## 2019-11-16 RX ADMIN — SACUBITRIL AND VALSARTAN 1 TABLET(S): 24; 26 TABLET, FILM COATED ORAL at 05:56

## 2019-11-16 NOTE — PROGRESS NOTE ADULT - SUBJECTIVE AND OBJECTIVE BOX
OPERATIVE PROCEDURE(s):         cabg readmitted for right calf pain         SURGEON(s): yuli      SUBJECTIVE ASSESSMENT: pt complains of having frequent BM's, but most recently they have become more formed.  Pt had bedside pleurodesis with Doxycycline and is no longer complaining of pain    Vital Signs Last 24 Hrs  T(C): 37.1 (16 Nov 2019 12:00), Max: 37.1 (16 Nov 2019 12:00)  T(F): 98.8 (16 Nov 2019 12:00), Max: 98.8 (16 Nov 2019 12:00)  HR: 86 (16 Nov 2019 14:15) (67 - 86)  BP: 112/54 (16 Nov 2019 14:15) (68/41 - 136/62)  BP(mean): 78 (16 Nov 2019 14:15) (50 - 91)  RR: 34 (16 Nov 2019 14:15) (11 - 37)  SpO2: 93% (16 Nov 2019 14:15) (81% - 100%)  11-15-19 @ 07:01  -  11-16-19 @ 07:00  --------------------------------------------------------  IN: 1020 mL / OUT: 1535 mL / NET: -515 mL    11-16-19 @ 07:01  -  11-16-19 @ 15:10  --------------------------------------------------------  IN: 740 mL / OUT: 295 mL / NET: 445 mL      Physical Exam  General: alert and oriented x 3  Chest: mild dec bs at right base   CVS: s1s2  Abd: pos bs soft nt  GI/ :  Ext: no sig edema  incisions- c/d/i  sternum- intact  Central Venous Catheter: Yes[ ]  Nox ] , If Yes indication:           Day #    Castellon Catheter: Yes  [ ] , No [x ] : If yes indication:                         Day #    NGT: Yes [ ] No [  x]     If Yes Placement:                                          Day #    Post-Op Beta-Blockers: Yes [x ], No[ ], If No, then contraindication:    CHEST TUBE:  [x ] YES [ ] NO  OUTPUT:    210 per 24 hours    AIR LEAKS:  [ ] YES [x ] NO      EPICARDIAL WIRES:  [ ] YES [ xNO      BOWEL MOVEMENT:  [x YES [ ] NO          LABS:                        9.7    7.77  )-----------( 186      ( 16 Nov 2019 00:03 )             31.9     COUMADIN:   [ ] YES [ x] NO      11-16    147<H>  |  111<H>  |  46<H>  ----------------------------<  119<H>  5.2<H>   |  23  |  1.3    Ca    8.9      16 Nov 2019 00:03  Mg     1.8     11-14          MEDICATIONS  (STANDING):  acetaminophen  IVPB .. 1000 milliGRAM(s) IV Intermittent once  ALBUTerol    0.083% 2.5 milliGRAM(s) Nebulizer every 6 hours  ascorbic acid 500 milliGRAM(s) Oral daily  aspirin  chewable 81 milliGRAM(s) Oral daily  atorvastatin 40 milliGRAM(s) Oral at bedtime  carvedilol 6.25 milliGRAM(s) Oral every 12 hours  dextrose 5%. 1000 milliLiter(s) (50 mL/Hr) IV Continuous <Continuous>  dextrose 50% Injectable 12.5 Gram(s) IV Push once  dextrose 50% Injectable 25 Gram(s) IV Push once  dextrose 50% Injectable 25 Gram(s) IV Push once  furosemide    Tablet 20 milliGRAM(s) Oral daily  heparin  Injectable 5000 Unit(s) SubCutaneous every 8 hours  insulin lispro (HumaLOG) corrective regimen sliding scale   SubCutaneous three times a day before meals  lactobacillus acidophilus 1 Tablet(s) Oral three times a day with meals  megestrol Suspension 800 milliGRAM(s) Oral daily  metFORMIN 1000 milliGRAM(s) Oral every 12 hours  mirtazapine 7.5 milliGRAM(s) Oral at bedtime  nystatin Cream 1 Application(s) Topical two times a day  pantoprazole    Tablet 40 milliGRAM(s) Oral before breakfast  sacubitril 49 mG/valsartan 51 mG 1 Tablet(s) Oral two times a day  sertraline 25 milliGRAM(s) Oral daily  sodium chloride 0.9% lock flush 3 milliLiter(s) IV Push every 8 hours  sodium chloride 0.9%. 1000 milliLiter(s) (500 mL/Hr) IV Continuous <Continuous>  spironolactone 12.5 milliGRAM(s) Oral daily  tiotropium 18 MICROgram(s) Capsule 1 Capsule(s) Inhalation daily  zinc sulfate 220 milliGRAM(s) Oral daily    MEDICATIONS  (PRN):  acetaminophen   Tablet .. 650 milliGRAM(s) Oral every 6 hours PRN Mild Pain (1 - 3)  dextrose 40% Gel 15 Gram(s) Oral once PRN Blood Glucose LESS THAN 70 milliGRAM(s)/deciliter  glucagon  Injectable 1 milliGRAM(s) IntraMuscular once PRN Glucose LESS THAN 70 milligrams/deciliter  ondansetron Injectable 4 milliGRAM(s) IV Push every 8 hours PRN Nausea and/or Vomiting  oxyCODONE    IR 5 milliGRAM(s) Oral at bedtime PRN Mild Pain (1 - 3)      Allergies    No Known Allergies    Intolerances        Ambulation/Activity Status:    pt refused to walk today due to copious bm's    RADIOLOGY & ADDITIONAL TESTS:  Xray Chest 1 View- PORTABLE-Routine: AM   Indication: Shortness of Breath  Transport: Portable,  w/ Monitor  Provider's Contact #: [phone] (11-16-19 @ 09:57)  EXAM:  XR CHEST PORTABLE ROUTINE 1V            PROCEDURE DATE:  11/16/2019      INTERPRETATION:  Clinical History / Reason for exam: Shortness of breath    Comparison : Chest radiograph November 15, 2019.    Technique/Positioning: Adequate.    Findings:    Support devices: Right-sided chest tube with its tip overlying the right   base.    Cardiac/mediastinum/hilum: Stable. Status post a median sternotomy and   cardiomegaly.    Lung parenchyma/Pleura: Bilateral opacities, unchanged. No pneumothorax   is seen.    Skeleton/soft tissues: Stable    Impression:      Status post a median sternotomy and cardiomegaly, unchanged.    Bilateral opacities, unchanged.    Right-sided chest tube.    Assessment/Plan: Patient is a 67 yo female s/p cabg and now readmitted for right calf pain  cont present tx as per ct surgeon  s/p cabg - cont asa statin and coreg  chronic systolic dysfunction - cont entresto; start aldactone; and cont diuresis with lasix   s/p pleurodesis - will cont to monitor effusion; chest tube had no drainage overnight ; poss may d/c tomorrow  diarrhea- cont to hold all laxatives and will start lactobacilli  dm- cont glucophage  repeat bmp to check creat  dec coreg due to hypotension this morning    Social Service Disposition:

## 2019-11-16 NOTE — PROGRESS NOTE ADULT - SUBJECTIVE AND OBJECTIVE BOX
CTU Attending Progress Daily Note     16 Nov 2019 11:45  Admited 11-02-19, Hospital Day 14d    HPI:  67 y/o F with h/o CAD s/p CABG, ICM, chronic systolic heart failure with EF ~ 25% coming today with rash in RLE and pain. Patient can't bear any weight. The pain started suddenly. She also notes some SOB but this is not that significant compared to her baseline. She can walk minimally around the house before getting SOB. She denies PND or orthopnea but has pedal edema. No LOC, palpitations, bleeding episodes. (03 Nov 2019 07:05)    Home Medications:  acetaminophen 325 mg oral tablet: 2 tab(s) orally every 6 hours, As needed, Mild Pain (1 - 3) (18 Oct 2019 14:37)    FAMILY HISTORY:  FH: myocardial infarction  FH: stomach cancer    PAST MEDICAL & SURGICAL HISTORY:  Female bladder prolapse  Diabetes  History of repair of hip fracture    Interval event for past 24 hr:  GUS WILBURN  68y had no event.   Current Complains:  GUS WILBURN has no new complains  Allergies    No Known Allergies    Intolerances      OBJECTIVE:  Vitals last 24 hrs  T(C): 36.8 (11-16-19 @ 07:45), Max: 36.8 (11-16-19 @ 07:45)  T(F): 98.3 (11-16-19 @ 07:45), Max: 98.3 (11-16-19 @ 07:45)  HR: 69 (11-16-19 @ 09:45) (67 - 87)  BP: 97/56 (11-16-19 @ 09:45) (68/41 - 148/70)  ABP: --  ABP(mean): --  RR: 24 (11-16-19 @ 09:45) (11 - 37)  SpO2: 94% (11-16-19 @ 09:45) (93% - 100%)      11-15-19 @ 07:01  -  11-16-19 @ 07:00  --------------------------------------------------------  IN:    Oral Fluid: 1020 mL  Total IN: 1020 mL    OUT:    Chest Tube: 210 mL    Indwelling Catheter - Urethral: 1325 mL  Total OUT: 1535 mL    Total NET: -515 mL          CAPILLARY BLOOD GLUCOSE      POCT Blood Glucose.: 136 mg/dL (16 Nov 2019 11:06)  POCT Blood Glucose.: 111 mg/dL (16 Nov 2019 06:02)  POCT Blood Glucose.: 133 mg/dL (15 Nov 2019 21:38)  POCT Blood Glucose.: 148 mg/dL (15 Nov 2019 18:57)    LABS:                          9.7    7.77  )-----------( 186      ( 16 Nov 2019 00:03 )             31.9     Hemoglobin: 9.7 g/dL (11-16 @ 00:03)  Hemoglobin: 9.7 g/dL (11-14 @ 22:31)  Hemoglobin: 10.2 g/dL (11-14 @ 02:40)    11-16    147<H>  |  111<H>  |  46<H>  ----------------------------<  119<H>  5.2<H>   |  23  |  1.3    Ca    8.9      16 Nov 2019 00:03  Mg     1.8     11-14      Creatinine, Serum: 1.3 mg/dL (11-16 @ 00:03)  Creatinine, Serum: 1.1 mg/dL (11-14 @ 22:31)  Creatinine, Serum: 1.0 mg/dL (11-14 @ 02:40)  Creatinine, Serum: 1.2 mg/dL (11-13 @ 03:20)          HOSPITAL MEDICATIONS:  MEDICATIONS  (STANDING):  acetaminophen  IVPB .. 1000 milliGRAM(s) IV Intermittent once  ALBUTerol    0.083% 2.5 milliGRAM(s) Nebulizer every 6 hours  ascorbic acid 500 milliGRAM(s) Oral daily  aspirin  chewable 81 milliGRAM(s) Oral daily  atorvastatin 40 milliGRAM(s) Oral at bedtime  carvedilol 6.25 milliGRAM(s) Oral every 12 hours  dextrose 5%. 1000 milliLiter(s) (50 mL/Hr) IV Continuous <Continuous>  dextrose 50% Injectable 12.5 Gram(s) IV Push once  dextrose 50% Injectable 25 Gram(s) IV Push once  dextrose 50% Injectable 25 Gram(s) IV Push once  furosemide    Tablet 20 milliGRAM(s) Oral daily  heparin  Injectable 5000 Unit(s) SubCutaneous every 8 hours  insulin lispro (HumaLOG) corrective regimen sliding scale   SubCutaneous three times a day before meals  megestrol Suspension 800 milliGRAM(s) Oral daily  metFORMIN 1000 milliGRAM(s) Oral every 12 hours  mirtazapine 7.5 milliGRAM(s) Oral at bedtime  nystatin Cream 1 Application(s) Topical two times a day  pantoprazole    Tablet 40 milliGRAM(s) Oral before breakfast  sacubitril 49 mG/valsartan 51 mG 1 Tablet(s) Oral two times a day  sertraline 25 milliGRAM(s) Oral daily  sodium chloride 0.9% lock flush 3 milliLiter(s) IV Push every 8 hours  sodium chloride 0.9%. 1000 milliLiter(s) (500 mL/Hr) IV Continuous <Continuous>  spironolactone 12.5 milliGRAM(s) Oral daily  tiotropium 18 MICROgram(s) Capsule 1 Capsule(s) Inhalation daily  zinc sulfate 220 milliGRAM(s) Oral daily    MEDICATIONS  (PRN):  acetaminophen   Tablet .. 650 milliGRAM(s) Oral every 6 hours PRN Mild Pain (1 - 3)  dextrose 40% Gel 15 Gram(s) Oral once PRN Blood Glucose LESS THAN 70 milliGRAM(s)/deciliter  glucagon  Injectable 1 milliGRAM(s) IntraMuscular once PRN Glucose LESS THAN 70 milligrams/deciliter  ondansetron Injectable 4 milliGRAM(s) IV Push every 8 hours PRN Nausea and/or Vomiting  oxyCODONE    IR 5 milliGRAM(s) Oral at bedtime PRN Mild Pain (1 - 3)      REVIEW OF SYSTEMS:  CONSTITUTIONAL: [X] all negative; [ ] weakness, [ ] fevers, [ ] chills  EYES/ENT: [X] all negative; [ ] visual changes, [ ] vertigo, [ ] throat pain, [ ] eye pain  NECK: [X] all negative; [ ] pain, [ ] stiffness  RESPIRATORY: [ ] all negative, [ ] cough, [ ] wheezing, [ ] hemoptysis, [x ] shortness of breath  CARDIOVASCULAR: [ ] all negative; [x ] chest pain, [ ] palpitations, [ ] orthopnea  GASTROINTESTINAL: [X] all negative; [ ]abdominal pain, [ ] nausea, [ ] vomiting, [ ] hematemesis, [ ] diarrhea, [ ] constipation, [ ] melena, [ ] hematochezia.  GENITOURINARY: [X] all negative; [ ] dysuria, [ ] frequency, [ ] hematuria  NEUROLOGICAL: [X] all negative; [ ] numbness, [ ] weakness, [ ] paresthesias  MUSCULOSKELETAL: [X] all negative, [ ] joint pain, [ ] joint swelling, [ ] joint redness, [ ] bone pain  SKIN: [X] all negative; [ ] itching, [ ] burning, [ ] rashes, [ ] lesions   All other review of systems is negative unless indicated above.    [  ] Unable to assess ROS because     PHYSICAL EXAM:          CONSTITUTIONAL: Well-developed; well-nourished; in no acute distress.   	SKIN: warm, dry, no rashes, stage 2 sacral decubitus  - better  	HEENT: Atraumatic. Normocephalic. PERRL. Moist membranes, no conj injection, sclera clear  	NECK: Supple; non tender.  No JVD. No lymphadenopathy.  	CARD: Normal S1, S2. Rate and Rhythm are regular. No murmurs.  	RESP: Good air entry bilaterally, no wheezes, + rales no rhonchi.  	ABD: Soft, not tender, not distended, no CVA ttp no rebound no guarding, bowel sounds present  	EXT: Normal ROM.  No clubbing, no cyanosis, no pedal edema, right calf pain, Peripheral pulses intact.  	LYMPH: No acute cervical adenopathy.  	NEURO: Alert, awake, motor 5/5 R, 5/5 L, sensation intact bilat, CN 2-12 intact,          PSYCH: Cooperative, appropriate. Alert & oriented x 3    RADIOLOGY:  X Reviewed and interpreted by me  CxR from 11-16-19 shows mild congestion, small Right pneumothorax, left mod effusion, no cardiomegaly,   Chest Tubes in place,     ECG:  X Reviewed and interpreted by me - NSR

## 2019-11-16 NOTE — PROGRESS NOTE ADULT - ASSESSMENT
PROBLEMS:  I spent 45 minutes time examining patient, reviewing vitals, labs, medications, imaging and discussing with the team goals of care to prevent life-threatening in this patient who is at high risk for deterioration or death due to:    1.	chronic CHF - on coreg ( droop the dose to 6.25, entresto and spironolactone + lasix   2.	Hypotention - probably due to combine effect of all meds - will change timing  3.	RIC - mild rise in Cr - monitor. BMP at 2 pm today  4.	Hyperkalemia - monitor  5.	Pleural effusions - s/p Rt pleurodesis - keep Pig tail for now  6.	Small Right pneumothorax - no air leak - observe  7.	Stage 2 sacral decubiti - local care  8.	loose BM - stop laxative     PLAN  Neuro: move all 4 extremities. no sensory or motor deficits  Pain management.   acetaminophen   Tablet .. 650 milliGRAM(s) Oral every 6 hours PRN  acetaminophen  IVPB .. 1000 milliGRAM(s) IV Intermittent once  mirtazapine 7.5 milliGRAM(s) Oral at bedtime  ondansetron Injectable 4 milliGRAM(s) IV Push every 8 hours PRN  oxyCODONE    IR 5 milliGRAM(s) Oral at bedtime PRN  sertraline 25 milliGRAM(s) Oral daily    Pulm: Wean off supplemental oxygen as able. Daily CXR. Encourage coughing, deep breathing and use of incentive spirometry.   ALBUTerol    0.083% 2.5 milliGRAM(s) Nebulizer every 6 hours  tiotropium 18 MICROgram(s) Capsule 1 Capsule(s) Inhalation daily    Cardio: Monitor telemetry/alarms. Continue supportive care   carvedilol 6.25 milliGRAM(s) Oral every 12 hours  furosemide    Tablet 20 milliGRAM(s) Oral daily  sacubitril 49 mG/valsartan 51 mG 1 Tablet(s) Oral two times a day  spironolactone 12.5 milliGRAM(s) Oral daily    GI: Continue stool softeners.    pantoprazole    Tablet 40 milliGRAM(s) Oral before breakfast    Nutrition: Continue present diet  Endocrine and glucose control:   atorvastatin 40 milliGRAM(s) Oral at bedtime  dextrose 40% Gel 15 Gram(s) Oral once PRN  dextrose 50% Injectable 12.5 Gram(s) IV Push once  dextrose 50% Injectable 25 Gram(s) IV Push once  dextrose 50% Injectable 25 Gram(s) IV Push once  glucagon  Injectable 1 milliGRAM(s) IntraMuscular once PRN  insulin lispro (HumaLOG) corrective regimen sliding scale   SubCutaneous three times a day before meals  megestrol Suspension 800 milliGRAM(s) Oral daily  metFORMIN 1000 milliGRAM(s) Oral every 12 hours    Renal: monitor urine output, supplement electrolytes as needed,     Vasc: Heparin SC and/or SCDs for DVT prophylaxis  aspirin  chewable 81 milliGRAM(s) Oral daily  heparin  Injectable 5000 Unit(s) SubCutaneous every 8 hours    ID: Stable, no fever , no chills. Off antibiotics.    Therapy: OOB/ambulate  Disposition: start planing discharge home or placement    Pertinent clinical, laboratory, radiographic, hemodynamic, echocardiographic, respiratory data, microbiologic data and chart were reviewed and analyzed frequently throughout the course of the day and night. GI and DVT prophylaxis, glycemic control, head of bed elevation and skin care issues were addressed.  Patient seen, examined and plan discussed with CT Surgery / CTICU team during rounds.    [ ] The patient remains in critical and unstable condition, and requires ICU care and monitoring  [x ] The patient is improving but requires continued monitoring and adjustment of therapy

## 2019-11-17 LAB
ANION GAP SERPL CALC-SCNC: 12 MMOL/L — SIGNIFICANT CHANGE UP (ref 7–14)
BUN SERPL-MCNC: 39 MG/DL — HIGH (ref 10–20)
CALCIUM SERPL-MCNC: 8.6 MG/DL — SIGNIFICANT CHANGE UP (ref 8.5–10.1)
CHLORIDE SERPL-SCNC: 114 MMOL/L — HIGH (ref 98–110)
CO2 SERPL-SCNC: 21 MMOL/L — SIGNIFICANT CHANGE UP (ref 17–32)
CREAT SERPL-MCNC: 1.1 MG/DL — SIGNIFICANT CHANGE UP (ref 0.7–1.5)
GLUCOSE BLDC GLUCOMTR-MCNC: 111 MG/DL — HIGH (ref 70–99)
GLUCOSE BLDC GLUCOMTR-MCNC: 111 MG/DL — HIGH (ref 70–99)
GLUCOSE BLDC GLUCOMTR-MCNC: 152 MG/DL — HIGH (ref 70–99)
GLUCOSE BLDC GLUCOMTR-MCNC: 212 MG/DL — HIGH (ref 70–99)
GLUCOSE SERPL-MCNC: 113 MG/DL — HIGH (ref 70–99)
POTASSIUM SERPL-MCNC: 5.3 MMOL/L — HIGH (ref 3.5–5)
POTASSIUM SERPL-SCNC: 5.3 MMOL/L — HIGH (ref 3.5–5)
SODIUM SERPL-SCNC: 147 MMOL/L — HIGH (ref 135–146)

## 2019-11-17 PROCEDURE — 71045 X-RAY EXAM CHEST 1 VIEW: CPT | Mod: 26

## 2019-11-17 PROCEDURE — 99024 POSTOP FOLLOW-UP VISIT: CPT

## 2019-11-17 RX ADMIN — SERTRALINE 25 MILLIGRAM(S): 25 TABLET, FILM COATED ORAL at 12:21

## 2019-11-17 RX ADMIN — PANTOPRAZOLE SODIUM 40 MILLIGRAM(S): 20 TABLET, DELAYED RELEASE ORAL at 06:03

## 2019-11-17 RX ADMIN — NYSTATIN CREAM 1 APPLICATION(S): 100000 CREAM TOPICAL at 17:29

## 2019-11-17 RX ADMIN — Medication 1 TABLET(S): at 12:21

## 2019-11-17 RX ADMIN — Medication 20 MILLIGRAM(S): at 05:20

## 2019-11-17 RX ADMIN — METFORMIN HYDROCHLORIDE 1000 MILLIGRAM(S): 850 TABLET ORAL at 17:28

## 2019-11-17 RX ADMIN — Medication 2: at 11:06

## 2019-11-17 RX ADMIN — SODIUM CHLORIDE 3 MILLILITER(S): 9 INJECTION INTRAMUSCULAR; INTRAVENOUS; SUBCUTANEOUS at 21:00

## 2019-11-17 RX ADMIN — Medication 650 MILLIGRAM(S): at 05:26

## 2019-11-17 RX ADMIN — HEPARIN SODIUM 5000 UNIT(S): 5000 INJECTION INTRAVENOUS; SUBCUTANEOUS at 05:21

## 2019-11-17 RX ADMIN — SODIUM CHLORIDE 3 MILLILITER(S): 9 INJECTION INTRAMUSCULAR; INTRAVENOUS; SUBCUTANEOUS at 05:22

## 2019-11-17 RX ADMIN — SODIUM CHLORIDE 3 MILLILITER(S): 9 INJECTION INTRAMUSCULAR; INTRAVENOUS; SUBCUTANEOUS at 13:23

## 2019-11-17 RX ADMIN — Medication 1 TABLET(S): at 17:28

## 2019-11-17 RX ADMIN — NYSTATIN CREAM 1 APPLICATION(S): 100000 CREAM TOPICAL at 05:21

## 2019-11-17 RX ADMIN — ATORVASTATIN CALCIUM 40 MILLIGRAM(S): 80 TABLET, FILM COATED ORAL at 21:00

## 2019-11-17 RX ADMIN — MEGESTROL ACETATE 800 MILLIGRAM(S): 40 SUSPENSION ORAL at 12:21

## 2019-11-17 RX ADMIN — MIRTAZAPINE 7.5 MILLIGRAM(S): 45 TABLET, ORALLY DISINTEGRATING ORAL at 21:00

## 2019-11-17 RX ADMIN — CARVEDILOL PHOSPHATE 6.25 MILLIGRAM(S): 80 CAPSULE, EXTENDED RELEASE ORAL at 17:28

## 2019-11-17 RX ADMIN — HEPARIN SODIUM 5000 UNIT(S): 5000 INJECTION INTRAVENOUS; SUBCUTANEOUS at 21:00

## 2019-11-17 RX ADMIN — HEPARIN SODIUM 5000 UNIT(S): 5000 INJECTION INTRAVENOUS; SUBCUTANEOUS at 13:24

## 2019-11-17 RX ADMIN — Medication 500 MILLIGRAM(S): at 12:21

## 2019-11-17 RX ADMIN — ZINC SULFATE TAB 220 MG (50 MG ZINC EQUIVALENT) 220 MILLIGRAM(S): 220 (50 ZN) TAB at 12:21

## 2019-11-17 RX ADMIN — Medication 81 MILLIGRAM(S): at 12:21

## 2019-11-17 RX ADMIN — CARVEDILOL PHOSPHATE 6.25 MILLIGRAM(S): 80 CAPSULE, EXTENDED RELEASE ORAL at 05:20

## 2019-11-17 RX ADMIN — SACUBITRIL AND VALSARTAN 1 TABLET(S): 24; 26 TABLET, FILM COATED ORAL at 05:20

## 2019-11-17 RX ADMIN — SACUBITRIL AND VALSARTAN 1 TABLET(S): 24; 26 TABLET, FILM COATED ORAL at 17:28

## 2019-11-17 RX ADMIN — Medication 1 TABLET(S): at 07:49

## 2019-11-17 RX ADMIN — Medication 650 MILLIGRAM(S): at 06:00

## 2019-11-17 RX ADMIN — METFORMIN HYDROCHLORIDE 1000 MILLIGRAM(S): 850 TABLET ORAL at 05:20

## 2019-11-17 NOTE — PROGRESS NOTE ADULT - SUBJECTIVE AND OBJECTIVE BOX
OPERATIVE PROCEDURE(s):   cabg readmitted for leg pain             SURGEON(s): yuli      SUBJECTIVE ASSESSMENT: pt complains of occasional shortness of breath and of occasional calf pain relieved when walking  pt also states that diarrhea has improved     Vital Signs Last 24 Hrs  T(C): 36.2 (17 Nov 2019 12:00), Max: 37.3 (17 Nov 2019 04:00)  T(F): 97.1 (17 Nov 2019 12:00), Max: 99.1 (17 Nov 2019 04:00)  HR: 92 (17 Nov 2019 12:00) (68 - 94)  BP: 128/61 (17 Nov 2019 12:00) (98/55 - 139/70)  BP(mean): 88 (17 Nov 2019 12:00) (74 - 99)  RR: 32 (17 Nov 2019 12:00) (15 - 34)  SpO2: 98% (17 Nov 2019 12:00) (93% - 98%)  11-16-19 @ 07:01  -  11-17-19 @ 07:00  --------------------------------------------------------  IN: 1220 mL / OUT: 1220 mL / NET: 0 mL    11-17-19 @ 07:01  -  11-17-19 @ 13:13  --------------------------------------------------------  IN: 240 mL / OUT: 445 mL / NET: -205 mL    Physical Exam  General: alert and oriented x 3  Chest: mild dec bs at left base  CVS: s1s2  Abd: pos bs soft nt   GI/ :  Ext: trace edema  incisions- c/d/i  sternum-intact    Central Venous Catheter: Yes[ ]  No[ x] , If Yes indication:           Day #    Castellon Catheter: Yes  [x ] , No [ ] : If yes indication:        chronic retention               Day #    NGT: Yes [ ] No [ x ]     If Yes Placement:                                          Day #    Post-Op Beta-Blockers: Yes [x ], No[ ], If No, then contraindication:    CHEST TUBE:  [ x] YES [ ] NO  OUTPUT:     per 24 hours    AIR LEAKS:  [ ] YES [ ] NO      EPICARDIAL WIRES:  [ ] YES [x ] NO      BOWEL MOVEMENT:  [x ] YES [ ] NO          LABS:                        9.7    7.77  )-----------( 186      ( 16 Nov 2019 00:03 )             31.9     COUMADIN:   [ ] YES [ ] NO      11-17    147<H>  |  114<H>  |  39<H>  ----------------------------<  113<H>  5.3<H>   |  21  |  1.1    Ca    8.6      17 Nov 2019 03:50          MEDICATIONS  (STANDING):  acetaminophen  IVPB .. 1000 milliGRAM(s) IV Intermittent once  ALBUTerol    0.083% 2.5 milliGRAM(s) Nebulizer every 6 hours  ascorbic acid 500 milliGRAM(s) Oral daily  aspirin  chewable 81 milliGRAM(s) Oral daily  atorvastatin 40 milliGRAM(s) Oral at bedtime  carvedilol 6.25 milliGRAM(s) Oral every 12 hours  dextrose 5%. 1000 milliLiter(s) (50 mL/Hr) IV Continuous <Continuous>  dextrose 50% Injectable 12.5 Gram(s) IV Push once  dextrose 50% Injectable 25 Gram(s) IV Push once  dextrose 50% Injectable 25 Gram(s) IV Push once  furosemide    Tablet 20 milliGRAM(s) Oral daily  heparin  Injectable 5000 Unit(s) SubCutaneous every 8 hours  insulin lispro (HumaLOG) corrective regimen sliding scale   SubCutaneous three times a day before meals  lactobacillus acidophilus 1 Tablet(s) Oral three times a day with meals  megestrol Suspension 800 milliGRAM(s) Oral daily  metFORMIN 1000 milliGRAM(s) Oral every 12 hours  mirtazapine 7.5 milliGRAM(s) Oral at bedtime  nystatin Cream 1 Application(s) Topical two times a day  pantoprazole    Tablet 40 milliGRAM(s) Oral before breakfast  sacubitril 49 mG/valsartan 51 mG 1 Tablet(s) Oral two times a day  sertraline 25 milliGRAM(s) Oral daily  sodium chloride 0.9% lock flush 3 milliLiter(s) IV Push every 8 hours  sodium chloride 0.9%. 1000 milliLiter(s) (500 mL/Hr) IV Continuous <Continuous>  tiotropium 18 MICROgram(s) Capsule 1 Capsule(s) Inhalation daily  zinc sulfate 220 milliGRAM(s) Oral daily    MEDICATIONS  (PRN):  acetaminophen   Tablet .. 650 milliGRAM(s) Oral every 6 hours PRN Mild Pain (1 - 3)  dextrose 40% Gel 15 Gram(s) Oral once PRN Blood Glucose LESS THAN 70 milliGRAM(s)/deciliter  glucagon  Injectable 1 milliGRAM(s) IntraMuscular once PRN Glucose LESS THAN 70 milligrams/deciliter  ondansetron Injectable 4 milliGRAM(s) IV Push every 8 hours PRN Nausea and/or Vomiting      Allergies    No Known Allergies    Intolerances        Ambulation/Activity Status: ambulated with walker      RADIOLOGY & ADDITIONAL TESTS:  Xray Chest 1 View- PORTABLE-Routine: AM   Indication: Shortness of Breath  Transport: Portable,  w/ Monitor  Provider's Contact #: [phone] (11-17-19 @ 09:32)  XAM:  XR CHEST PORTABLE ROUTINE 1V            PROCEDURE DATE:  11/17/2019      INTERPRETATION:  Clinical History / Reason for exam: Shortness of breath    Comparison : Chest radiograph November 16, 2019.    Technique/Positioning: Portable frontal view     Findings:    Support devices: Right-sided chest tube with its tip overlying the right  base.    Cardiac/mediastinum/hilum: Stable. Status post a median sternotomy and   cardiomegaly.    Lung parenchyma/Pleura: Bilateral opacities, unchanged. Left pleural   effusion, unchanged. Unchanged small right apical pneumothorax.     Skeleton/soft tissues: Stable    Impression:    Status post a median sternotomy and cardiomegaly, unchanged.    Unchanged bilateral opacities, small right apical pneumothorax and left   pleural effusion.    Right-sided chest tube as above.    Assessment/Plan: Patient is a 67 yo female s/p cabg and now readmitted for right calf pain  cont present tx as per ct surgeon  s/p cabg - cont asa statin and coreg  chronic systolic dysfunction - cont entresto; and cont diuresis with lasix   s/p pleurodesis - will cont to monitor effusion; chest tube had no drainage overnight ; poss may d/c tomorrow  diarrhea- cont to hold all laxatives and will start lactobacilli  right effusion- no procedure at present time as per dr contreras; will cont ot monitor and cont diuretics  dm- cont glucophage  d/c aldactone due to hyperkalemia  dec coreg due to hypotension this morning    Social Service Disposition:  eger possibly tomorrow

## 2019-11-18 LAB
ANION GAP SERPL CALC-SCNC: 12 MMOL/L — SIGNIFICANT CHANGE UP (ref 7–14)
BUN SERPL-MCNC: 34 MG/DL — HIGH (ref 10–20)
CALCIUM SERPL-MCNC: 9 MG/DL — SIGNIFICANT CHANGE UP (ref 8.5–10.1)
CHLORIDE SERPL-SCNC: 112 MMOL/L — HIGH (ref 98–110)
CO2 SERPL-SCNC: 22 MMOL/L — SIGNIFICANT CHANGE UP (ref 17–32)
CREAT SERPL-MCNC: 1 MG/DL — SIGNIFICANT CHANGE UP (ref 0.7–1.5)
GLUCOSE BLDC GLUCOMTR-MCNC: 104 MG/DL — HIGH (ref 70–99)
GLUCOSE BLDC GLUCOMTR-MCNC: 109 MG/DL — HIGH (ref 70–99)
GLUCOSE BLDC GLUCOMTR-MCNC: 126 MG/DL — HIGH (ref 70–99)
GLUCOSE BLDC GLUCOMTR-MCNC: 144 MG/DL — HIGH (ref 70–99)
GLUCOSE SERPL-MCNC: 125 MG/DL — HIGH (ref 70–99)
HCT VFR BLD CALC: 33.2 % — LOW (ref 37–47)
HGB BLD-MCNC: 10.2 G/DL — LOW (ref 12–16)
MCHC RBC-ENTMCNC: 27.8 PG — SIGNIFICANT CHANGE UP (ref 27–31)
MCHC RBC-ENTMCNC: 30.7 G/DL — LOW (ref 32–37)
MCV RBC AUTO: 90.5 FL — SIGNIFICANT CHANGE UP (ref 81–99)
NRBC # BLD: 0 /100 WBCS — SIGNIFICANT CHANGE UP (ref 0–0)
PLATELET # BLD AUTO: 211 K/UL — SIGNIFICANT CHANGE UP (ref 130–400)
POTASSIUM SERPL-MCNC: 5.1 MMOL/L — HIGH (ref 3.5–5)
POTASSIUM SERPL-SCNC: 5.1 MMOL/L — HIGH (ref 3.5–5)
RBC # BLD: 3.67 M/UL — LOW (ref 4.2–5.4)
RBC # FLD: 17.2 % — HIGH (ref 11.5–14.5)
SODIUM SERPL-SCNC: 146 MMOL/L — SIGNIFICANT CHANGE UP (ref 135–146)
WBC # BLD: 8.43 K/UL — SIGNIFICANT CHANGE UP (ref 4.8–10.8)
WBC # FLD AUTO: 8.43 K/UL — SIGNIFICANT CHANGE UP (ref 4.8–10.8)

## 2019-11-18 PROCEDURE — 99232 SBSQ HOSP IP/OBS MODERATE 35: CPT

## 2019-11-18 PROCEDURE — 71045 X-RAY EXAM CHEST 1 VIEW: CPT | Mod: 26,77

## 2019-11-18 PROCEDURE — 71045 X-RAY EXAM CHEST 1 VIEW: CPT | Mod: 26

## 2019-11-18 PROCEDURE — 99024 POSTOP FOLLOW-UP VISIT: CPT

## 2019-11-18 RX ORDER — CARVEDILOL PHOSPHATE 80 MG/1
9.38 CAPSULE, EXTENDED RELEASE ORAL EVERY 12 HOURS
Refills: 0 | Status: DISCONTINUED | OUTPATIENT
Start: 2019-11-18 | End: 2019-11-19

## 2019-11-18 RX ORDER — CARVEDILOL PHOSPHATE 80 MG/1
3.12 CAPSULE, EXTENDED RELEASE ORAL EVERY 12 HOURS
Refills: 0 | Status: DISCONTINUED | OUTPATIENT
Start: 2019-11-18 | End: 2019-11-18

## 2019-11-18 RX ORDER — LOPERAMIDE HCL 2 MG
2 TABLET ORAL EVERY 6 HOURS
Refills: 0 | Status: DISCONTINUED | OUTPATIENT
Start: 2019-11-18 | End: 2019-11-19

## 2019-11-18 RX ADMIN — SODIUM CHLORIDE 3 MILLILITER(S): 9 INJECTION INTRAMUSCULAR; INTRAVENOUS; SUBCUTANEOUS at 15:51

## 2019-11-18 RX ADMIN — MIRTAZAPINE 7.5 MILLIGRAM(S): 45 TABLET, ORALLY DISINTEGRATING ORAL at 21:22

## 2019-11-18 RX ADMIN — HEPARIN SODIUM 5000 UNIT(S): 5000 INJECTION INTRAVENOUS; SUBCUTANEOUS at 13:30

## 2019-11-18 RX ADMIN — ZINC SULFATE TAB 220 MG (50 MG ZINC EQUIVALENT) 220 MILLIGRAM(S): 220 (50 ZN) TAB at 11:35

## 2019-11-18 RX ADMIN — METFORMIN HYDROCHLORIDE 1000 MILLIGRAM(S): 850 TABLET ORAL at 05:57

## 2019-11-18 RX ADMIN — SODIUM CHLORIDE 3 MILLILITER(S): 9 INJECTION INTRAMUSCULAR; INTRAVENOUS; SUBCUTANEOUS at 21:20

## 2019-11-18 RX ADMIN — HEPARIN SODIUM 5000 UNIT(S): 5000 INJECTION INTRAVENOUS; SUBCUTANEOUS at 21:22

## 2019-11-18 RX ADMIN — SERTRALINE 25 MILLIGRAM(S): 25 TABLET, FILM COATED ORAL at 11:35

## 2019-11-18 RX ADMIN — METFORMIN HYDROCHLORIDE 1000 MILLIGRAM(S): 850 TABLET ORAL at 17:18

## 2019-11-18 RX ADMIN — PANTOPRAZOLE SODIUM 40 MILLIGRAM(S): 20 TABLET, DELAYED RELEASE ORAL at 07:56

## 2019-11-18 RX ADMIN — MEGESTROL ACETATE 800 MILLIGRAM(S): 40 SUSPENSION ORAL at 11:35

## 2019-11-18 RX ADMIN — HEPARIN SODIUM 5000 UNIT(S): 5000 INJECTION INTRAVENOUS; SUBCUTANEOUS at 05:57

## 2019-11-18 RX ADMIN — NYSTATIN CREAM 1 APPLICATION(S): 100000 CREAM TOPICAL at 05:57

## 2019-11-18 RX ADMIN — SACUBITRIL AND VALSARTAN 1 TABLET(S): 24; 26 TABLET, FILM COATED ORAL at 05:57

## 2019-11-18 RX ADMIN — Medication 1 TABLET(S): at 16:32

## 2019-11-18 RX ADMIN — ATORVASTATIN CALCIUM 40 MILLIGRAM(S): 80 TABLET, FILM COATED ORAL at 21:22

## 2019-11-18 RX ADMIN — Medication 81 MILLIGRAM(S): at 11:35

## 2019-11-18 RX ADMIN — SODIUM CHLORIDE 3 MILLILITER(S): 9 INJECTION INTRAMUSCULAR; INTRAVENOUS; SUBCUTANEOUS at 05:59

## 2019-11-18 RX ADMIN — Medication 1 TABLET(S): at 07:56

## 2019-11-18 RX ADMIN — CARVEDILOL PHOSPHATE 6.25 MILLIGRAM(S): 80 CAPSULE, EXTENDED RELEASE ORAL at 05:30

## 2019-11-18 RX ADMIN — SACUBITRIL AND VALSARTAN 1 TABLET(S): 24; 26 TABLET, FILM COATED ORAL at 17:17

## 2019-11-18 RX ADMIN — Medication 650 MILLIGRAM(S): at 04:00

## 2019-11-18 RX ADMIN — NYSTATIN CREAM 1 APPLICATION(S): 100000 CREAM TOPICAL at 17:16

## 2019-11-18 RX ADMIN — CARVEDILOL PHOSPHATE 3.12 MILLIGRAM(S): 80 CAPSULE, EXTENDED RELEASE ORAL at 09:15

## 2019-11-18 RX ADMIN — Medication 2 MILLIGRAM(S): at 11:35

## 2019-11-18 RX ADMIN — Medication 500 MILLIGRAM(S): at 11:35

## 2019-11-18 RX ADMIN — Medication 20 MILLIGRAM(S): at 05:30

## 2019-11-18 RX ADMIN — Medication 1 TABLET(S): at 11:35

## 2019-11-18 RX ADMIN — CARVEDILOL PHOSPHATE 9.38 MILLIGRAM(S): 80 CAPSULE, EXTENDED RELEASE ORAL at 17:17

## 2019-11-18 NOTE — PROGRESS NOTE ADULT - PROBLEM SELECTOR PLAN 1
ICM s/p CABG   Euvolemic on exam   BP 110s-140s HR 90s  Continue Entresto 49-51 mg BID   Increase carvedilol to 9.375 mg bid  Previous episode of hypotension likely due to dehydration from diarrhea   Would consider further uptitration of betablocker   Hold spironolactone due to hyperkalemia  Hold diuretics while patient still has diarrhea    Daily weights   Low salt diet   Aggressive PT  Incentive inspirometry    Plan to dc to SNF tomorrow   If DC, will follow this coming Friday at [address]  Discussed with CTU and patient

## 2019-11-18 NOTE — PROGRESS NOTE ADULT - SUBJECTIVE AND OBJECTIVE BOX
OPERATIVE PROCEDURE(s):  cabg x 4              POD # 4    SURGEON(s): yuli      SUBJECTIVE ASSESSMENT: pt is complaining of some mild gas pain and continues to have diarrhea    Vital Signs Last 24 Hrs  T(C): 37.1 (18 Nov 2019 12:00), Max: 37.1 (18 Nov 2019 08:00)  T(F): 98.7 (18 Nov 2019 12:00), Max: 98.8 (18 Nov 2019 08:00)  HR: 90 (18 Nov 2019 12:00) (80 - 97)  BP: 127/64 (18 Nov 2019 12:00) (115/83 - 140/69)  BP(mean): 89 (18 Nov 2019 12:00) (77 - 97)  RR: 27 (18 Nov 2019 12:00) (20 - 42)  SpO2: 96% (18 Nov 2019 12:00) (94% - 99%)  11-17-19 @ 07:01  -  11-18-19 @ 07:00  --------------------------------------------------------  IN: 960 mL / OUT: 1962 mL / NET: -1002 mL    11-18-19 @ 07:01  -  11-18-19 @ 12:43  --------------------------------------------------------  IN: 100 mL / OUT: 388 mL / NET: -288 mL    Physical Exam  General: alert and oriented x 3  Chest: mild dec bs at left base  CVS: s1s2  Abd: pos bs soft nt   GI/ :  Ext: trace edema  incisions- c/d/i  sternum-intact    Central Venous Catheter: Yes[ ]  No[x ] , If Yes indication:           Day #    Castellon Catheter: Yes  [x ] , No [ ] : If yes indication:       chronic retention                  Day #    NGT: Yes [ ] No [ x]     If Yes Placement:                                          Day #    Post-Op Beta-Blockers: Yes [x ], No[ ], If No, then contraindication:    CHEST TUBE:  [ ] YES [ x] NO  OUTPUT:     per 24 hours    AIR LEAKS:  [ ] YES [ ] NO      EPICARDIAL WIRES:  [ ] YES [ x] NO      BOWEL MOVEMENT:  [x ] YES [ ] NO      LABS:                        10.2   8.43  )-----------( 211      ( 18 Nov 2019 03:00 )             33.2     COUMADIN:   [ ] YES [x ] NO      11-18    146  |  112<H>  |  34<H>  ----------------------------<  125<H>  5.1<H>   |  22  |  1.0    Ca    9.0      18 Nov 2019 03:00      MEDICATIONS  (STANDING):  acetaminophen  IVPB .. 1000 milliGRAM(s) IV Intermittent once  ALBUTerol    0.083% 2.5 milliGRAM(s) Nebulizer every 6 hours  ascorbic acid 500 milliGRAM(s) Oral daily  aspirin  chewable 81 milliGRAM(s) Oral daily  atorvastatin 40 milliGRAM(s) Oral at bedtime  carvedilol 3.125 milliGRAM(s) Oral every 12 hours  dextrose 5%. 1000 milliLiter(s) (50 mL/Hr) IV Continuous <Continuous>  dextrose 50% Injectable 12.5 Gram(s) IV Push once  dextrose 50% Injectable 25 Gram(s) IV Push once  dextrose 50% Injectable 25 Gram(s) IV Push once  furosemide    Tablet 20 milliGRAM(s) Oral daily  heparin  Injectable 5000 Unit(s) SubCutaneous every 8 hours  insulin lispro (HumaLOG) corrective regimen sliding scale   SubCutaneous three times a day before meals  lactobacillus acidophilus 1 Tablet(s) Oral three times a day with meals  megestrol Suspension 800 milliGRAM(s) Oral daily  metFORMIN 1000 milliGRAM(s) Oral every 12 hours  mirtazapine 7.5 milliGRAM(s) Oral at bedtime  nystatin Cream 1 Application(s) Topical two times a day  pantoprazole    Tablet 40 milliGRAM(s) Oral before breakfast  sacubitril 49 mG/valsartan 51 mG 1 Tablet(s) Oral two times a day  sertraline 25 milliGRAM(s) Oral daily  sodium chloride 0.9% lock flush 3 milliLiter(s) IV Push every 8 hours  sodium chloride 0.9%. 1000 milliLiter(s) (500 mL/Hr) IV Continuous <Continuous>  tiotropium 18 MICROgram(s) Capsule 1 Capsule(s) Inhalation daily    MEDICATIONS  (PRN):  acetaminophen   Tablet .. 650 milliGRAM(s) Oral every 6 hours PRN Mild Pain (1 - 3)  dextrose 40% Gel 15 Gram(s) Oral once PRN Blood Glucose LESS THAN 70 milliGRAM(s)/deciliter  glucagon  Injectable 1 milliGRAM(s) IntraMuscular once PRN Glucose LESS THAN 70 milligrams/deciliter  loperamide 2 milliGRAM(s) Oral every 6 hours PRN Diarrhea  ondansetron Injectable 4 milliGRAM(s) IV Push every 8 hours PRN Nausea and/or Vomiting      Allergies    No Known Allergies    Intolerances        Ambulation/Activity Status:  ambulates well with walker     RADIOLOGY & ADDITIONAL TESTS:  Xray Chest 2 Views PA/Lat: AM   Indication: Shortness of Breath  Transport: Stretcher-Crib,  w/ Monitor (11-18-19 @ 08:21)      EXAM:  XR CHEST PORTABLE IMMED 1V            PROCEDURE DATE:  11/18/2019        INTERPRETATION:  Clinical History / Reason for exam: Post chest tube   removal. Evaluate pneumothorax.    Comparison : Chest radiograph November 18, 2019 at 4:05 AM    Technique/Positioning: Frontal chest radiograph.    Findings:    Support devices: Interval removal of the right-sided pleural pigtail   catheter.    Cardiac/mediastinum/hilum: Unchanged.    Lung parenchyma/Pleura: Unchanged small right apical pneumothorax.   Unchanged bilateral pleural effusions, left greater than right. Unchanged   right perihilar and left basilar airspace opacities.    Skeleton/soft tissues: Unchanged.    Impression:      Since November 18, 2019 at 4:05 AM, unchanged small right apical   pneumothorax and interval removal of the right-sided pleural pigtail   catheter.    Unchanged bilateral pleural effusions and bibasilar opacities.    Assessment/Plan: Patient is a 69 yo female s/p cabg and now readmitted for right calf pain  cont present tx as per ct surgeon  s/p cabg - cont asa statin and coreg  inc coreg  chronic systolic dysfunction - cont entresto; and cont diuresis with lasix   s/p pleurodesis - will cont to monitor effusion; chest tube had no drainage overnight ; poss may d/c tomorrow  diarrhea- cont to hold all laxatives and will start lactobacilli  right effusion- no procedure at present time as per dr contreras; will cont ot monitor and cont diuretics  dm- cont glucophage  d/c aldactone due to hyperkalemia  dec coreg due to hypotension this morning  loperamide for diarrhea  d/c chest tube  Social Service Disposition:  eger tomorrow OPERATIVE PROCEDURE(s):  cabg x 4              POD # 4    SURGEON(s): yuli  SUBJECTIVE ASSESSMENT: pt is complaining of some mild gas pain and continues to have diarrhea    Vital Signs Last 24 Hrs  T(C): 37.1 (18 Nov 2019 12:00), Max: 37.1 (18 Nov 2019 08:00)  T(F): 98.7 (18 Nov 2019 12:00), Max: 98.8 (18 Nov 2019 08:00)  HR: 90 (18 Nov 2019 12:00) (80 - 97)  BP: 127/64 (18 Nov 2019 12:00) (115/83 - 140/69)  BP(mean): 89 (18 Nov 2019 12:00) (77 - 97)  RR: 27 (18 Nov 2019 12:00) (20 - 42)  SpO2: 96% (18 Nov 2019 12:00) (94% - 99%)  11-17-19 @ 07:01  -  11-18-19 @ 07:00  --------------------------------------------------------  IN: 960 mL / OUT: 1962 mL / NET: -1002 mL    11-18-19 @ 07:01  -  11-18-19 @ 12:43  --------------------------------------------------------  IN: 100 mL / OUT: 388 mL / NET: -288 mL    Physical Exam  General: alert and oriented x 3  Chest: mild dec bs at left base  CVS: s1s2  Abd: pos bs soft nt   GI/ :  Ext: trace edema  incisions- c/d/i  sternum-intact    Central Venous Catheter: Yes[ ]  No[x ] , If Yes indication:           Day #    Castellon Catheter: Yes  [x ] , No [ ] : If yes indication:       chronic retention                  Day #    NGT: Yes [ ] No [ x]     If Yes Placement:                                          Day #    Post-Op Beta-Blockers: Yes [x ], No[ ], If No, then contraindication:    CHEST TUBE:  [ ] YES [ x] NO  OUTPUT:     per 24 hours    AIR LEAKS:  [ ] YES [ ] NO      EPICARDIAL WIRES:  [ ] YES [ x] NO      BOWEL MOVEMENT:  [x ] YES [ ] NO      LABS:                        10.2   8.43  )-----------( 211      ( 18 Nov 2019 03:00 )             33.2     COUMADIN:   [ ] YES [x ] NO      11-18    146  |  112<H>  |  34<H>  ----------------------------<  125<H>  5.1<H>   |  22  |  1.0    Ca    9.0      18 Nov 2019 03:00    MEDICATIONS  (STANDING):  acetaminophen  IVPB .. 1000 milliGRAM(s) IV Intermittent once  ALBUTerol    0.083% 2.5 milliGRAM(s) Nebulizer every 6 hours  ascorbic acid 500 milliGRAM(s) Oral daily  aspirin  chewable 81 milliGRAM(s) Oral daily  atorvastatin 40 milliGRAM(s) Oral at bedtime  carvedilol 3.125 milliGRAM(s) Oral every 12 hours  furosemide    Tablet 20 milliGRAM(s) Oral daily  heparin  Injectable 5000 Unit(s) SubCutaneous every 8 hours  insulin lispro (HumaLOG) corrective regimen sliding scale   SubCutaneous three times a day before meals  lactobacillus acidophilus 1 Tablet(s) Oral three times a day with meals  megestrol Suspension 800 milliGRAM(s) Oral daily  metFORMIN 1000 milliGRAM(s) Oral every 12 hours  mirtazapine 7.5 milliGRAM(s) Oral at bedtime  nystatin Cream 1 Application(s) Topical two times a day  pantoprazole    Tablet 40 milliGRAM(s) Oral before breakfast  sacubitril 49 mG/valsartan 51 mG 1 Tablet(s) Oral two times a day  sertraline 25 milliGRAM(s) Oral daily  tiotropium 18 MICROgram(s) Capsule 1 Capsule(s) Inhalation daily    MEDICATIONS  (PRN):  acetaminophen   Tablet .. 650 milliGRAM(s) Oral every 6 hours PRN Mild Pain (1 - 3)  dextrose 40% Gel 15 Gram(s) Oral once PRN Blood Glucose LESS THAN 70 milliGRAM(s)/deciliter  glucagon  Injectable 1 milliGRAM(s) IntraMuscular once PRN Glucose LESS THAN 70 milligrams/deciliter  loperamide 2 milliGRAM(s) Oral every 6 hours PRN Diarrhea  ondansetron Injectable 4 milliGRAM(s) IV Push every 8 hours PRN Nausea and/or Vomiting    Allergies  No Known Allergies    Ambulation/Activity Status:  ambulates well with walker     RADIOLOGY & ADDITIONAL TESTS:  Xray Chest 2 Views PA/Lat: AM   Indication: Shortness of Breath  Transport: Stretcher-Crib,  w/ Monitor (11-18-19 @ 08:21)      EXAM:  XR CHEST PORTABLE IMMED 1V            PROCEDURE DATE:  11/18/2019        INTERPRETATION:  Clinical History / Reason for exam: Post chest tube   removal. Evaluate pneumothorax.    Comparison : Chest radiograph November 18, 2019 at 4:05 AM    Technique/Positioning: Frontal chest radiograph.    Findings:    Support devices: Interval removal of the right-sided pleural pigtail   catheter.    Cardiac/mediastinum/hilum: Unchanged.    Lung parenchyma/Pleura: Unchanged small right apical pneumothorax.   Unchanged bilateral pleural effusions, left greater than right. Unchanged   right perihilar and left basilar airspace opacities.    Skeleton/soft tissues: Unchanged.    Impression:      Since November 18, 2019 at 4:05 AM, unchanged small right apical   pneumothorax and interval removal of the right-sided pleural pigtail   catheter.    Unchanged bilateral pleural effusions and bibasilar opacities.    Assessment/Plan: Patient is a 67 yo female s/p cabg and now readmitted for right calf pain  cont present tx as per ct surgeon  s/p cabg - cont asa statin and coreg  inc coreg  chronic systolic dysfunction - cont entresto; and cont diuresis with lasix   s/p pleurodesis - will cont to monitor effusion; chest tube had no drainage overnight ; poss may d/c tomorrow  diarrhea- cont to hold all laxatives and will start lactobacilli  right effusion- no procedure at present time as per dr contreras; will cont ot monitor and cont diuretics  dm- cont glucophage  d/c aldactone due to hyperkalemia  dec coreg due to hypotension this morning  loperamide for diarrhea  d/c chest tube  Social Service Disposition:  eger tomorrow

## 2019-11-18 NOTE — PROGRESS NOTE ADULT - ATTENDING COMMENTS
69 y/o F with h/o CAD s/p CABG, ICM, chronic systolic heart failure with EF ~ 25% coming in with RLE pain.   admitted with left cellulitis and R LL PNA    CV: cont heart failure regimen for afterload reduction  cont milr 0.25 gtt   even balance last 24hrs  cont coreg 6.25mg q12  isordil/hydralazine if BP allows (was held last 24hrs)    Pulm:  pt asks for bipap b/c it makes her feel better, yet she doesn't need it  R pigtail drained 420cc/24hrs  I think pt didn't have PNA (no secretions, CXR cleared immediately after we drained additional 2L out of the chest)  d/c cefepime    Renal:  creat/BUN up, bumex held  cont milr for rising creat/bun to improve perfusion    GI:  cont GI ppx  UGI endoscopy after d/c  pt with good appetite/PO intake as per nursing staff and pt's family  prealb 17    Heme:  off AC, appendage clipped  cont ASA    ID:  d/c abx  wbc normal  sputum cx negative  no leukocytosis    Psych:  psych consult for depression  started zoloft 25mg PO qd    :   montejo in place  d/c flomax to avoid polypharmacy    pt to get appetite stimulant (megace)
69 y/o F with h/o CAD s/p CABG, ICM, chronic systolic heart failure with EF ~ 25% coming in with RLE pain.   admitted with left cellulitis and R LL PNA    CV: cont heart failure regimen for afterload reduction  start milr 0.25 gtt as pts creat/bun are climbing up and patient retains fluid  even 24hrs balance  cont coreg 6.25mg q12  isordil/hydralazine if BP allows    Pulm:  pt asks for bipap b/c it makes her feel better, yet she doesn't need it  R pigtail drained 320cc/24hrs  d/c steroids as we  don't think interstitial patches are related to some sort of inflammatory process  I think pt didn't have PNA (no secretions, CXR cleared immediately after we drained additional 2L out of the chest)  d/c cefepime    Renal:  creat/BUN up, bumex held  cont milr for rising creat/bun to improve perfusion    GI:  cont GI ppx  UGI endoscopy after d/c  pt with good appetite/PO intake as per nursing staff and pt's family  prealb 17    Heme:  off AC, appendage clipped  cont ASA    ID:  d/c abx  wbc normal  sputum cx negative  no leukocytosis    Psych consult for depression  pt received 1 percocet at night    :   montejo in place
69 y/o F with h/o CAD s/p CABG, ICM, chronic systolic heart failure with EF ~ 25% coming in with RLE pain.   admitted with left cellulitis and R LL PNA  CV: restarting pt's heart failure regimen for afterload reduction  hydralazine/coreg low dose/isordil 5q8    Renal:  creat improved  cont Bumex 1 qd PO    GI:  cont GI ppx  UGI endoscopy after d/c    Heme:  off AC, appendage clipped  cont ASA    ID:  cont abx for PNA/cellulitis  no leukocytosis    Pulm:  wean O2, pt has contraction alkalosis  chest PT/pulm toilet/mobilize (pt has trouble bearing weight in her right foot)  will treat her with couple of doses of diamox
69 y/o F with h/o CAD s/p CABG, ICM, chronic systolic heart failure with EF ~ 25% coming in with RLE pain.   admitted with left cellulitis and R LL PNA  CV: restarting pt's heart failure regimen for afterload reduction  hydralazine/coreg low dose/isordil 5q8    Renal:  creat improved  increase Bumex 1 PO to bid    GI:  cont GI ppx  UGI endoscopy after d/c    Heme:  off AC, appendage clipped  cont ASA    ID:  cont abx for PNA/cellulitis  no leukocytosis    Pulm:  wean O2, pt has contraction alkalosis  chest PT/pulm toilet/mobilize (pt has trouble bearing weight in her right foot)  will treat her with couple of doses of diamox
67 y/o F with h/o CAD s/p CABG, ICM, chronic systolic heart failure with EF ~ 25% coming in with RLE pain.   admitted with left cellulitis and R LL PNA  much improved    CV:   cont heart failure regimen for afterload reduction  cont coreg 3.125mg q12/entersto  CHF f/u is appreciated    Pulm:  s/p R chest pleurodesis, chest tube d/c'd    Renal:  creat/BUN much improved  cont diuresis with lasix 20 PO qd    GI:  cont GI ppx  UGI endoscopy after d/c  cont megace    Heme:  off AC, appendage clipped  cont ASA    ID:  wbc normal  sputum cx negative  no leukocytosis    Psych:  cont zoloft 25mg PO qd  Remeron rec by psych for poor sleep    :   montejo in place    Endo:  HgA1C is 5.7  FS well controlled    Plan on d/c [facility] tomorrow
67 y/o F with h/o CAD s/p CABG, ICM, chronic systolic heart failure with EF ~ 25% coming in with RLE pain.   admitted with left cellulitis and R LL PNA  pt looks better, walks more today    CV: cont heart failure regimen for afterload reduction  off milr  slightly neg balance  cont coreg 9.375mg q12  replace isordil/hydralazine with entresto  CHF f/u is appreciated    Pulm:  R pigtail drained ~200/24hrs (down), took it off suction  may d/c catheter tomorrow if under 200cc/24hrs    Renal:  creat/BUN much improved  cont diuresis with bumex 1IV    GI:  cont GI ppx  UGI endoscopy after d/c  cont megace    Heme:  off AC, appendage clipped  cont ASA    ID:  wbc normal  sputum cx negative  no leukocytosis    Psych:  cont zoloft 25mg PO qd    :   montejo in place    Endo:  HgA1C is 5.7  FS well controlled    Plan on d/c home tomorrow
69 y/o F with h/o CAD s/p CABG, ICM, chronic systolic heart failure with EF ~ 25% coming in with RLE pain.   admitted with left cellulitis and R LL PNA    CV: cont heart failure regimen for afterload reduction  d/c milr  negative 2L/24hrs  cont coreg 6.25mg q12  restart isordil    Pulm:  R pigtail drained 720cc/24hrs  pt reports difficulty breathing    Renal:  creat/BUN down, bumex held  autodiuresing    GI:  cont GI ppx  UGI endoscopy after d/c  pt with good appetite/PO intake as per nursing staff and pt's family  prealb 17    Heme:  off AC, appendage clipped  cont ASA    ID:  off abx  wbc normal    Psych:  cont zoloft 25mg PO qd  should we restart seroquel at night    :   montejo in place    pt to get appetite stimulant (megace)
69 y/o F with h/o CAD s/p CABG, ICM, chronic systolic heart failure with EF ~ 25% coming in with RLE pain.   admitted with left cellulitis and R LL PNA    CV: cont heart failure regimen for afterload reduction  hydralazine 10q8/coreg dose/isordil 10q8    Pulm:  pt with subjective dyspnea  R pleural effusion drained again with pigtail, 2.5L drained  CT chest noncontrast is obtained. Lung reexpanded with some patch areas of consolidation  chest PT/pulm toilet/mobilize (pt has trouble bearing weight in her right foot)  started on steroids for patchy infiltrates    Renal:  creat/BUN up, bumex held today    GI:  cont GI ppx  UGI endoscopy after d/c  pt with good appetite/PO intake as per nursing staff and pt's family    Heme:  off AC, appendage clipped  cont ASA    ID:  cont abx for PNA (cefepime)  sputum cx negative  no leukocytosis
69 y/o F with h/o CAD s/p CABG, ICM, chronic systolic heart failure with EF ~ 25% coming in with RLE pain.   admitted with left cellulitis and R LL PNA  pt looks better, walks more today    CV: cont heart failure regimen for afterload reduction  off milr  slightly neg balance  cont coreg 6.25mg q12  isordil/hydralazine   CHF f/u is appreciated    Pulm:  R pigtail drained ~400/24hrs    Renal:  creat/BUN much improved    GI:  cont GI ppx  UGI endoscopy after d/c  pt with good appetite/PO intake as per nursing staff and pt's family  prealb 17    Heme:  off AC, appendage clipped  cont ASA    ID:  wbc normal  sputum cx negative  no leukocytosis    Psych:  cont zoloft 25mg PO qd    :   montejo in place    pt to get appetite stimulant (megace)
67 y/o F with h/o CAD s/p CABG, ICM, chronic systolic heart failure with EF ~ 25% coming in with RLE pain.   admitted with left cellulitis and R LL PNA    CV: cont heart failure regimen for afterload reduction  start milr 0.25 gtt as pts creat/bun are climbing up and patient retains fluid  hydralazine 10q8/coreg 6.25mg q12/isordil 10q8    Pulm:  dyspnea improved, patient on RA  R pigtail drained 400cc/24hrs  d/c steroids as we  don't think interstitial patches are related to some sort of inflammatory process    Renal:  creat/BUN up, bumex held  d/c acetazolamide  start milr for rising creat/bun to improve perfusion    GI:  cont GI ppx  UGI endoscopy after d/c  pt with good appetite/PO intake as per nursing staff and pt's family    Heme:  off AC, appendage clipped  cont ASA    ID:  cont abx for PNA (cefepime)  sputum cx negative  no leukocytosis
69 y/o F with h/o CAD s/p CABG, ICM, chronic systolic heart failure with EF ~ 25% coming in with RLE pain.   admitted with left cellulitis and R LL PNA    CV: cont heart failure regimen for afterload reduction  hydralazine 10q8/coreg dose/isordil 10q8    Pulm:  pt with subjective dyspnea  R pleural effusion drained again with pigtail, 1.7L drained  CT chest noncontrast is obtained. Lung reexpanded with some patch areas of consolidation  ?amio lung?  amio d/c'd  chest PT/pulm toilet/mobilize (pt has trouble bearing weight in her right foot)    Renal:  creat up today, bumex held today    GI:  cont GI ppx  UGI endoscopy after d/c  pt with good appetite/PO intake as per nursing staff and pt's family    Heme:  off AC, appendage clipped  cont ASA    ID:  cont abx for PNA/cellulitis  d/c vanco until the level is checked  no leukocytosis
69 y/o F with h/o CAD s/p CABG, ICM, chronic systolic heart failure with EF ~ 25% coming in with RLE pain.   admitted with left cellulitis and R LL PNA  pt looks better, walks more today    CV:   cont heart failure regimen for afterload reduction  cont coreg 9.375mg q12/entersto  CHF f/u is appreciated    Pulm:  R pigtail drained ~500/24hrs  pt recommended pleurodesis    Renal:  creat/BUN much improved  cont diuresis with bumex 1IV    GI:  cont GI ppx  UGI endoscopy after d/c  cont megace    Heme:  off AC, appendage clipped  cont ASA    ID:  wbc normal  sputum cx negative  no leukocytosis    Psych:  cont zoloft 25mg PO qd  Remeron rec by psych for poor sleep    :   montejo in place    Endo:  HgA1C is 5.7  FS well controlled    Plan on d/c home tomorrow

## 2019-11-18 NOTE — PROGRESS NOTE ADULT - PROBLEM SELECTOR PROBLEM 1
Acute on chronic systolic (congestive) heart failure

## 2019-11-18 NOTE — PHARMACOTHERAPY INTERVENTION NOTE - COMMENTS
I spoke with Dr. Dorado he said that he is aware of the elevated potassium and that the patient is aware that the patient is on coreg and Entresto and that will also increase the potassium
Prescriber was contacted to adjust frequency due to GFR=29
Spoke with ER intern in regards to patient K level of 5.1. Use of ARBs can cause hyperkalemia.
Spoke with ER intern in regards to patients GFR of 29 and metformin dosing. Metformin is patients home med; however according to [employer], a GFR <30 use is contraindicated. Intern will speak with attending in regards to continuing patient on metformin.
not suspecting c.diff

## 2019-11-18 NOTE — PROGRESS NOTE ADULT - ASSESSMENT
67 y/o F with h/o CAD s/p CABG, ICM, chronic systolic heart failure admitted with acute leg pain and rash. DVT ruled out. Patient came large pleural effusion s/p pigtail catheter.

## 2019-11-18 NOTE — PROGRESS NOTE ADULT - SUBJECTIVE AND OBJECTIVE BOX
Interval history: Patient feels good today, her mood has improved. She remains stable hemodynamically. Euvolemic on exam.         HPI:  69 y/o F with h/o CAD s/p CABG, ICM, chronic systolic heart failure with EF ~ 25% coming today with rash in RLE and pain. Patient can't bear any weight. The pain started suddenly. She also notes some SOB but this is not that significant compared to her baseline. She can walk minimally around the house before getting SOB. She denies PND or orthopnea but has pedal edema. No LOC, palpitations, bleeding episodes.       Patient admitted to CTU and started on broad spectrum Abx, started on iv diuretics. She reports feeling slightly better from her RLE pain.     PSH: CABG    Social: No ETOH or smoking

## 2019-11-18 NOTE — PROGRESS NOTE ADULT - NSHPATTENDINGPLANDISCUSS_GEN_ALL_CORE
CTU  team
crow/w CTU attending and PA and Dr. Gandara
crow/w CTU attending and PA and Dr. Gandara
d/w CTU attending
d/w CTU attending
CTU team
d/w CTU attending
crow/w CTU attending and PA and Dr. Gandara
d/w CTU attending
d/w CTU attending

## 2019-11-18 NOTE — PROGRESS NOTE ADULT - PROBLEM SELECTOR PROBLEM 2
S/P CABG (coronary artery bypass graft)

## 2019-11-18 NOTE — PROGRESS NOTE ADULT - PROBLEM SELECTOR PROBLEM 4
Right leg pain

## 2019-11-18 NOTE — PROGRESS NOTE ADULT - PROBLEM SELECTOR PLAN 2
No post op complications   Continue statin therapy
No post op complications   Continue statin therapy and aspirin
No post op complications   Continue statin therapy
No post op complications   Continue statin therapy

## 2019-11-18 NOTE — PROGRESS NOTE ADULT - PROBLEM SELECTOR PLAN 3
Creatinine had decreased to 1.2 and increased to 1.7  Hold Bumex   Check vancomycin trough
Improving   Creatinine/BUN 1./34  Continue diuretics as above
Improving   Creatinine/BUN 1.2/60  Slightly fluid overloaded on exam  Continue diuretics as above
Improving   Creatinine/BUN 1.4/86  Fluid overloaded on exam  Continue diuretics as above
Likely from overdiuresis  Improved   K 5.5 - holding Entresto
Improving   Creatinine/BUN 1.1/46  Continue diuretics as above
Creatinine/BUN 2.0   Fluid overloaded on exam  Continue diuretics as above

## 2019-11-18 NOTE — PROGRESS NOTE ADULT - SUBJECTIVE AND OBJECTIVE BOX
CTU Attending Progress Daily Note     18 Nov 2019 10:10  Admited 11-02-19, Hospital Day 16d    HPI:  69 y/o F with h/o CAD s/p CABG, ICM, chronic systolic heart failure with EF ~ 25% coming today with rash in RLE and pain. Patient can't bear any weight. The pain started suddenly. She also notes some SOB but this is not that significant compared to her baseline. She can walk minimally around the house before getting SOB. She denies PND or orthopnea but has pedal edema. No LOC, palpitations, bleeding episodes. (03 Nov 2019 07:05)    Home Medications:  acetaminophen 325 mg oral tablet: 2 tab(s) orally every 6 hours, As needed, Mild Pain (1 - 3) (18 Oct 2019 14:37)    FAMILY HISTORY:  FH: myocardial infarction  FH: stomach cancer    PAST MEDICAL & SURGICAL HISTORY:  Female bladder prolapse  Diabetes  History of repair of hip fracture    Interval event for past 24 hr:  GUS WILBURN  68y had no event.   Current Complains:  GUS WILBURN has no new complains  Allergies    No Known Allergies    Intolerances      OBJECTIVE:  Vitals last 24 hrs  T(C): 37.1 (11-18-19 @ 08:00), Max: 37.1 (11-18-19 @ 08:00)  T(F): 98.8 (11-18-19 @ 08:00), Max: 98.8 (11-18-19 @ 08:00)  HR: 80 (11-18-19 @ 08:00) (80 - 97)  BP: 117/56 (11-18-19 @ 08:00) (115/83 - 140/69)  ABP: --  ABP(mean): --  RR: 42 (11-18-19 @ 08:00) (20 - 42)  SpO2: 99% (11-18-19 @ 08:00) (94% - 99%)      11-17-19 @ 07:01  -  11-18-19 @ 07:00  --------------------------------------------------------  IN:    Oral Fluid: 960 mL  Total IN: 960 mL    OUT:    Indwelling Catheter - Urethral: 1962 mL  Total OUT: 1962 mL    Total NET: -1002 mL          CAPILLARY BLOOD GLUCOSE      POCT Blood Glucose.: 126 mg/dL (18 Nov 2019 06:44)  POCT Blood Glucose.: 212 mg/dL (17 Nov 2019 20:59)  POCT Blood Glucose.: 111 mg/dL (17 Nov 2019 16:18)  POCT Blood Glucose.: 152 mg/dL (17 Nov 2019 10:38)    LABS:                          10.2   8.43  )-----------( 211      ( 18 Nov 2019 03:00 )             33.2     Hemoglobin: 10.2 g/dL (11-18 @ 03:00)  Hemoglobin: 9.7 g/dL (11-16 @ 00:03)    11-18    146  |  112<H>  |  34<H>  ----------------------------<  125<H>  5.1<H>   |  22  |  1.0    Ca    9.0      18 Nov 2019 03:00      Creatinine, Serum: 1.0 mg/dL (11-18 @ 03:00)  Creatinine, Serum: 1.1 mg/dL (11-17 @ 03:50)  Creatinine, Serum: 1.3 mg/dL (11-16 @ 14:58)  Creatinine, Serum: 1.3 mg/dL (11-16 @ 00:03)  Creatinine, Serum: 1.1 mg/dL (11-14 @ 22:31)          HOSPITAL MEDICATIONS:  MEDICATIONS  (STANDING):  acetaminophen  IVPB .. 1000 milliGRAM(s) IV Intermittent once  ALBUTerol    0.083% 2.5 milliGRAM(s) Nebulizer every 6 hours  ascorbic acid 500 milliGRAM(s) Oral daily  aspirin  chewable 81 milliGRAM(s) Oral daily  atorvastatin 40 milliGRAM(s) Oral at bedtime  carvedilol 3.125 milliGRAM(s) Oral every 12 hours  dextrose 5%. 1000 milliLiter(s) (50 mL/Hr) IV Continuous <Continuous>  dextrose 50% Injectable 12.5 Gram(s) IV Push once  dextrose 50% Injectable 25 Gram(s) IV Push once  dextrose 50% Injectable 25 Gram(s) IV Push once  furosemide    Tablet 20 milliGRAM(s) Oral daily  heparin  Injectable 5000 Unit(s) SubCutaneous every 8 hours  insulin lispro (HumaLOG) corrective regimen sliding scale   SubCutaneous three times a day before meals  lactobacillus acidophilus 1 Tablet(s) Oral three times a day with meals  megestrol Suspension 800 milliGRAM(s) Oral daily  metFORMIN 1000 milliGRAM(s) Oral every 12 hours  mirtazapine 7.5 milliGRAM(s) Oral at bedtime  nystatin Cream 1 Application(s) Topical two times a day  pantoprazole    Tablet 40 milliGRAM(s) Oral before breakfast  sacubitril 49 mG/valsartan 51 mG 1 Tablet(s) Oral two times a day  sertraline 25 milliGRAM(s) Oral daily  sodium chloride 0.9% lock flush 3 milliLiter(s) IV Push every 8 hours  sodium chloride 0.9%. 1000 milliLiter(s) (500 mL/Hr) IV Continuous <Continuous>  tiotropium 18 MICROgram(s) Capsule 1 Capsule(s) Inhalation daily  zinc sulfate 220 milliGRAM(s) Oral daily    MEDICATIONS  (PRN):  acetaminophen   Tablet .. 650 milliGRAM(s) Oral every 6 hours PRN Mild Pain (1 - 3)  dextrose 40% Gel 15 Gram(s) Oral once PRN Blood Glucose LESS THAN 70 milliGRAM(s)/deciliter  glucagon  Injectable 1 milliGRAM(s) IntraMuscular once PRN Glucose LESS THAN 70 milligrams/deciliter  ondansetron Injectable 4 milliGRAM(s) IV Push every 8 hours PRN Nausea and/or Vomiting      REVIEW OF SYSTEMS:  CONSTITUTIONAL: [X] all negative; [ ] weakness, [ ] fevers, [ ] chills  EYES/ENT: [X] all negative; [ ] visual changes, [ ] vertigo, [ ] throat pain, [ ] eye pain  NECK: [X] all negative; [ ] pain, [ ] stiffness  RESPIRATORY: [ ] all negative, [x ] cough, [ ] wheezing, [ ] hemoptysis, [x ] shortness of breath  CARDIOVASCULAR: [ ] all negative; [x ] chest pain, [ ] palpitations, [ ] orthopnea  GASTROINTESTINAL: [X] all negative; [ ]abdominal pain, [ ] nausea, [ ] vomiting, [ ] hematemesis, [ ] diarrhea, [ ] constipation, [ ] melena, [ ] hematochezia.  GENITOURINARY: [X] all negative; [ ] dysuria, [ ] frequency, [ ] hematuria  NEUROLOGICAL: [X] all negative; [ ] numbness, [ ] weakness, [ ] paresthesias  MUSCULOSKELETAL: [X] all negative, [ ] joint pain, [ ] joint swelling, [ ] joint redness, [ ] bone pain  SKIN: [X] all negative; [ ] itching, [ ] burning, [ ] rashes, [ ] lesions   All other review of systems is negative unless indicated above.    [  ] Unable to assess ROS because     PHYSICAL EXAM:          CONSTITUTIONAL: Well-developed; well-nourished; in no acute distress.   	SKIN: warm, dry, no rashes or lesions =- stage 2 decubitus improving  	HEENT: Atraumatic. Normocephalic. PERRL. Moist membranes, no conj injection, sclera clear  	NECK: Supple; non tender.  No JVD. No lymphadenopathy.  	CARD: Normal S1, S2. Rate and Rhythm are regular. No murmurs.  	RESP: Good air entry bilaterally, no wheezes, + rales no rhonchi.  	ABD: Soft, not tender, not distended, no CVA ttp no rebound no guarding, bowel sounds present  	EXT: Normal ROM.  No clubbing, no cyanosis, no pedal edema, no calf pain b/l, Peripheral pulses intact.  	LYMPH: No acute cervical adenopathy.  	NEURO: Alert, awake, motor 5/5 R, 5/5 L, sensation intact bilat, CN 2-12 intact,          PSYCH: Cooperative, appropriate. Alert & oriented x 3    RADIOLOGY:  X Reviewed and interpreted by me  CxR from 11-18-19 shows mild congestion, no pneumothorax, no effusion, no cardiomegaly,   Chest Tubes in place,     ECG:  X Reviewed and interpreted by me - NSR

## 2019-11-18 NOTE — PROGRESS NOTE ADULT - PROBLEM SELECTOR PROBLEM 3
RIC (acute kidney injury)

## 2019-11-18 NOTE — PROGRESS NOTE ADULT - ASSESSMENT
PROBLEMS:  I spent 45 minutes examining patient, reviewing vitals, labs, medications, imaging and discussing with the team goals of care to prevent life-threatening in this patient who is at high risk for deterioration or death due to:    1.	chronic CHF - on coreg ( increse dose to 9.375 ), entresto,  lasix   2.	RIC - resolving - monitor.    3.	Hyperkalemia - monitor, off spironolactone  4.	Pleural effusions - s/p Rt pleurodesis - keep Pig tail for now  5.	Small Right pneumothorax - no air leak - d/c today  6.	Stage 2 sacral decubiti - local care  7.	loose BM - resolved     PLAN  Neuro: move all 4 extremities. no sensory or motor deficits  Pain management.   acetaminophen   Tablet .. 650 milliGRAM(s) Oral every 6 hours PRN  acetaminophen  IVPB .. 1000 milliGRAM(s) IV Intermittent once  mirtazapine 7.5 milliGRAM(s) Oral at bedtime  ondansetron Injectable 4 milliGRAM(s) IV Push every 8 hours PRN  sertraline 25 milliGRAM(s) Oral daily    Pulm: Wean off supplemental oxygen as able. Daily CXR. Encourage coughing, deep breathing and use of incentive spirometry.   ALBUTerol    0.083% 2.5 milliGRAM(s) Nebulizer every 6 hours  tiotropium 18 MICROgram(s) Capsule 1 Capsule(s) Inhalation daily    Cardio: Monitor telemetry/alarms. Continue supportive care   carvedilol 3.125 milliGRAM(s) Oral every 12 hours  furosemide    Tablet 20 milliGRAM(s) Oral daily  sacubitril 49 mG/valsartan 51 mG 1 Tablet(s) Oral two times a day    GI: Continue stool softeners.    pantoprazole    Tablet 40 milliGRAM(s) Oral before breakfast    Nutrition: Continue present diet  Endocrine and glucose control:   atorvastatin 40 milliGRAM(s) Oral at bedtime  dextrose 40% Gel 15 Gram(s) Oral once PRN  dextrose 50% Injectable 12.5 Gram(s) IV Push once  dextrose 50% Injectable 25 Gram(s) IV Push once  dextrose 50% Injectable 25 Gram(s) IV Push once  glucagon  Injectable 1 milliGRAM(s) IntraMuscular once PRN  insulin lispro (HumaLOG) corrective regimen sliding scale   SubCutaneous three times a day before meals  megestrol Suspension 800 milliGRAM(s) Oral daily  metFORMIN 1000 milliGRAM(s) Oral every 12 hours    Renal: monitor urine output, supplement electrolytes as needed,     Vasc: Heparin SC and/or SCDs for DVT prophylaxis  aspirin  chewable 81 milliGRAM(s) Oral daily  heparin  Injectable 5000 Unit(s) SubCutaneous every 8 hours    ID: Stable, no fever , no chills. Off antibiotics.    Tubes: Monitor Chest tube output  Therapy: OOB/ambulate  Disposition: start planing discharge home or placement    Pertinent clinical, laboratory, radiographic, hemodynamic, echocardiographic, respiratory data, microbiologic data and chart were reviewed and analyzed frequently throughout the course of the day and night. GI and DVT prophylaxis, glycemic control, head of bed elevation and skin care issues were addressed.  Patient seen, examined and plan discussed with CT Surgery / CTICU team during rounds.    [ ] The patient remains in critical and unstable condition, and requires ICU care and monitoring  [x ] The patient is improving but requires continued monitoring and adjustment of therapy

## 2019-11-18 NOTE — PROGRESS NOTE ADULT - NEUROLOGICAL
Alert & oriented; no sensory, motor or coordination deficits, normal reflexes
detailed exam

## 2019-11-18 NOTE — PROGRESS NOTE ADULT - CVS HE PE MLT D E PC
no new murmurs
regular heart sounds, parasternal systolic murmur
regular rate and rhythm/no murmur
regular rate and rhythm
regular rate and rhythm/no murmur

## 2019-11-18 NOTE — PROGRESS NOTE ADULT - PROBLEM SELECTOR PLAN 4
Doppler showed thrombosis of right peroneal vein - unsure whether this can explain such significant pain and swelling.   Improving but still can't bear weight
On Broad spectrum Abx for possible cellulitis   Doppler negative for DVT  Getting repeat doppler today
Pain much improved   Leg still swollen
Pain resolved   Leg still swollen but b/l
Pain resolved   No swelling
Pain resolved   Leg still swollen but b/l
Pain slightly improved   Leg still swollen

## 2019-11-18 NOTE — PROGRESS NOTE ADULT - GASTROINTESTINAL DETAILS
nontender/soft/no rebound tenderness/no rigidity/no organomegaly/no guarding
nontender/soft
soft
soft
soft/nontender

## 2019-11-19 ENCOUNTER — TRANSCRIPTION ENCOUNTER (OUTPATIENT)
Age: 69
End: 2019-11-19

## 2019-11-19 VITALS
OXYGEN SATURATION: 98 % | RESPIRATION RATE: 22 BRPM | TEMPERATURE: 98 F | DIASTOLIC BLOOD PRESSURE: 60 MMHG | SYSTOLIC BLOOD PRESSURE: 133 MMHG | HEART RATE: 92 BPM

## 2019-11-19 DIAGNOSIS — L03.90 CELLULITIS, UNSPECIFIED: ICD-10-CM

## 2019-11-19 LAB
ANION GAP SERPL CALC-SCNC: 13 MMOL/L — SIGNIFICANT CHANGE UP (ref 7–14)
ANION GAP SERPL CALC-SCNC: 13 MMOL/L — SIGNIFICANT CHANGE UP (ref 7–14)
BUN SERPL-MCNC: 27 MG/DL — HIGH (ref 10–20)
BUN SERPL-MCNC: 30 MG/DL — HIGH (ref 10–20)
CALCIUM SERPL-MCNC: 9.2 MG/DL — SIGNIFICANT CHANGE UP (ref 8.5–10.1)
CALCIUM SERPL-MCNC: 9.4 MG/DL — SIGNIFICANT CHANGE UP (ref 8.5–10.1)
CHLORIDE SERPL-SCNC: 113 MMOL/L — HIGH (ref 98–110)
CHLORIDE SERPL-SCNC: 113 MMOL/L — HIGH (ref 98–110)
CO2 SERPL-SCNC: 22 MMOL/L — SIGNIFICANT CHANGE UP (ref 17–32)
CO2 SERPL-SCNC: 24 MMOL/L — SIGNIFICANT CHANGE UP (ref 17–32)
CREAT SERPL-MCNC: 0.9 MG/DL — SIGNIFICANT CHANGE UP (ref 0.7–1.5)
CREAT SERPL-MCNC: 1.1 MG/DL — SIGNIFICANT CHANGE UP (ref 0.7–1.5)
GLUCOSE BLDC GLUCOMTR-MCNC: 105 MG/DL — HIGH (ref 70–99)
GLUCOSE BLDC GLUCOMTR-MCNC: 86 MG/DL — SIGNIFICANT CHANGE UP (ref 70–99)
GLUCOSE SERPL-MCNC: 100 MG/DL — HIGH (ref 70–99)
GLUCOSE SERPL-MCNC: 103 MG/DL — HIGH (ref 70–99)
HCT VFR BLD CALC: 33.8 % — LOW (ref 37–47)
HGB BLD-MCNC: 10.4 G/DL — LOW (ref 12–16)
MAGNESIUM SERPL-MCNC: 1.4 MG/DL — LOW (ref 1.8–2.4)
MCHC RBC-ENTMCNC: 28.2 PG — SIGNIFICANT CHANGE UP (ref 27–31)
MCHC RBC-ENTMCNC: 30.8 G/DL — LOW (ref 32–37)
MCV RBC AUTO: 91.6 FL — SIGNIFICANT CHANGE UP (ref 81–99)
NRBC # BLD: 0 /100 WBCS — SIGNIFICANT CHANGE UP (ref 0–0)
PLATELET # BLD AUTO: 214 K/UL — SIGNIFICANT CHANGE UP (ref 130–400)
POTASSIUM SERPL-MCNC: 5.4 MMOL/L — HIGH (ref 3.5–5)
POTASSIUM SERPL-MCNC: 5.6 MMOL/L — HIGH (ref 3.5–5)
POTASSIUM SERPL-SCNC: 5.4 MMOL/L — HIGH (ref 3.5–5)
POTASSIUM SERPL-SCNC: 5.6 MMOL/L — HIGH (ref 3.5–5)
RBC # BLD: 3.69 M/UL — LOW (ref 4.2–5.4)
RBC # FLD: 17.3 % — HIGH (ref 11.5–14.5)
SODIUM SERPL-SCNC: 148 MMOL/L — HIGH (ref 135–146)
SODIUM SERPL-SCNC: 150 MMOL/L — HIGH (ref 135–146)
WBC # BLD: 7.42 K/UL — SIGNIFICANT CHANGE UP (ref 4.8–10.8)
WBC # FLD AUTO: 7.42 K/UL — SIGNIFICANT CHANGE UP (ref 4.8–10.8)

## 2019-11-19 PROCEDURE — 99024 POSTOP FOLLOW-UP VISIT: CPT

## 2019-11-19 PROCEDURE — 99232 SBSQ HOSP IP/OBS MODERATE 35: CPT

## 2019-11-19 PROCEDURE — ZZZZZ: CPT

## 2019-11-19 PROCEDURE — 71046 X-RAY EXAM CHEST 2 VIEWS: CPT | Mod: 26

## 2019-11-19 RX ORDER — METFORMIN HYDROCHLORIDE 850 MG/1
1 TABLET ORAL
Qty: 0 | Refills: 0 | DISCHARGE
Start: 2019-11-19

## 2019-11-19 RX ORDER — ISOSORBIDE DINITRATE 5 MG/1
5 TABLET ORAL THREE TIMES A DAY
Refills: 0 | Status: DISCONTINUED | OUTPATIENT
Start: 2019-11-19 | End: 2019-11-19

## 2019-11-19 RX ORDER — MEGESTROL ACETATE 40 MG/ML
20 SUSPENSION ORAL
Qty: 0 | Refills: 0 | DISCHARGE
Start: 2019-11-19

## 2019-11-19 RX ORDER — ATORVASTATIN CALCIUM 80 MG/1
1 TABLET, FILM COATED ORAL
Qty: 0 | Refills: 0 | DISCHARGE
Start: 2019-11-19

## 2019-11-19 RX ORDER — FUROSEMIDE 40 MG
1 TABLET ORAL
Qty: 0 | Refills: 0 | DISCHARGE
Start: 2019-11-19

## 2019-11-19 RX ORDER — HEPARIN SODIUM 5000 [USP'U]/ML
5000 INJECTION INTRAVENOUS; SUBCUTANEOUS
Qty: 0 | Refills: 0 | DISCHARGE
Start: 2019-11-19

## 2019-11-19 RX ORDER — SACUBITRIL AND VALSARTAN 24; 26 MG/1; MG/1
1 TABLET, FILM COATED ORAL
Refills: 0 | Status: DISCONTINUED | OUTPATIENT
Start: 2019-11-19 | End: 2019-11-19

## 2019-11-19 RX ORDER — HYDRALAZINE HCL 50 MG
10 TABLET ORAL EVERY 8 HOURS
Refills: 0 | Status: DISCONTINUED | OUTPATIENT
Start: 2019-11-19 | End: 2019-11-19

## 2019-11-19 RX ORDER — PANTOPRAZOLE SODIUM 20 MG/1
1 TABLET, DELAYED RELEASE ORAL
Qty: 0 | Refills: 0 | DISCHARGE
Start: 2019-11-19

## 2019-11-19 RX ORDER — SACUBITRIL AND VALSARTAN 24; 26 MG/1; MG/1
1 TABLET, FILM COATED ORAL
Qty: 0 | Refills: 0 | DISCHARGE
Start: 2019-11-19

## 2019-11-19 RX ORDER — ASCORBIC ACID 60 MG
1 TABLET,CHEWABLE ORAL
Qty: 0 | Refills: 0 | DISCHARGE
Start: 2019-11-19

## 2019-11-19 RX ORDER — HYDRALAZINE HCL 50 MG
1 TABLET ORAL
Qty: 0 | Refills: 0 | DISCHARGE
Start: 2019-11-19

## 2019-11-19 RX ORDER — CARVEDILOL PHOSPHATE 80 MG/1
3 CAPSULE, EXTENDED RELEASE ORAL
Qty: 0 | Refills: 0 | DISCHARGE
Start: 2019-11-19

## 2019-11-19 RX ORDER — MAGNESIUM SULFATE 500 MG/ML
2 VIAL (ML) INJECTION ONCE
Refills: 0 | Status: COMPLETED | OUTPATIENT
Start: 2019-11-19 | End: 2019-11-19

## 2019-11-19 RX ORDER — LACTOBACILLUS ACIDOPHILUS 100MM CELL
1 CAPSULE ORAL
Qty: 0 | Refills: 0 | DISCHARGE
Start: 2019-11-19

## 2019-11-19 RX ORDER — NYSTATIN CREAM 100000 [USP'U]/G
1 CREAM TOPICAL
Qty: 0 | Refills: 0 | DISCHARGE
Start: 2019-11-19

## 2019-11-19 RX ORDER — MIRTAZAPINE 45 MG/1
1 TABLET, ORALLY DISINTEGRATING ORAL
Qty: 0 | Refills: 0 | DISCHARGE
Start: 2019-11-19

## 2019-11-19 RX ORDER — ACETAMINOPHEN 500 MG
2 TABLET ORAL
Qty: 0 | Refills: 0 | DISCHARGE
Start: 2019-11-19

## 2019-11-19 RX ORDER — ASPIRIN/CALCIUM CARB/MAGNESIUM 324 MG
1 TABLET ORAL
Qty: 0 | Refills: 0 | DISCHARGE
Start: 2019-11-19

## 2019-11-19 RX ORDER — ISOSORBIDE DINITRATE 5 MG/1
1 TABLET ORAL
Qty: 0 | Refills: 0 | DISCHARGE
Start: 2019-11-19

## 2019-11-19 RX ORDER — SERTRALINE 25 MG/1
1 TABLET, FILM COATED ORAL
Qty: 0 | Refills: 0 | DISCHARGE
Start: 2019-11-19

## 2019-11-19 RX ADMIN — Medication 1 TABLET(S): at 11:45

## 2019-11-19 RX ADMIN — Medication 1 TABLET(S): at 08:05

## 2019-11-19 RX ADMIN — HEPARIN SODIUM 5000 UNIT(S): 5000 INJECTION INTRAVENOUS; SUBCUTANEOUS at 05:29

## 2019-11-19 RX ADMIN — Medication 81 MILLIGRAM(S): at 11:46

## 2019-11-19 RX ADMIN — Medication 20 MILLIGRAM(S): at 05:29

## 2019-11-19 RX ADMIN — NYSTATIN CREAM 1 APPLICATION(S): 100000 CREAM TOPICAL at 05:28

## 2019-11-19 RX ADMIN — SODIUM CHLORIDE 3 MILLILITER(S): 9 INJECTION INTRAMUSCULAR; INTRAVENOUS; SUBCUTANEOUS at 05:24

## 2019-11-19 RX ADMIN — PANTOPRAZOLE SODIUM 40 MILLIGRAM(S): 20 TABLET, DELAYED RELEASE ORAL at 06:48

## 2019-11-19 RX ADMIN — SACUBITRIL AND VALSARTAN 1 TABLET(S): 24; 26 TABLET, FILM COATED ORAL at 05:29

## 2019-11-19 RX ADMIN — CARVEDILOL PHOSPHATE 9.38 MILLIGRAM(S): 80 CAPSULE, EXTENDED RELEASE ORAL at 05:29

## 2019-11-19 RX ADMIN — SERTRALINE 25 MILLIGRAM(S): 25 TABLET, FILM COATED ORAL at 11:46

## 2019-11-19 RX ADMIN — METFORMIN HYDROCHLORIDE 1000 MILLIGRAM(S): 850 TABLET ORAL at 05:29

## 2019-11-19 RX ADMIN — MEGESTROL ACETATE 800 MILLIGRAM(S): 40 SUSPENSION ORAL at 11:46

## 2019-11-19 RX ADMIN — Medication 50 GRAM(S): at 11:45

## 2019-11-19 RX ADMIN — Medication 500 MILLIGRAM(S): at 11:46

## 2019-11-19 NOTE — PROGRESS NOTE ADULT - REASON FOR ADMISSION
Shortness of breath, leg pain
leg pain
SOB and leg pain
Shortness of breath, leg pain

## 2019-11-19 NOTE — DISCHARGE NOTE PROVIDER - NSDCCPCAREPLAN_GEN_ALL_CORE_FT
PRINCIPAL DISCHARGE DIAGNOSIS  Diagnosis: Acute on chronic systolic (congestive) heart failure  Assessment and Plan of Treatment: Acute on chronic systolic (congestive) heart failure      SECONDARY DISCHARGE DIAGNOSES  Diagnosis: Acute on chronic systolic (congestive) heart failure  Assessment and Plan of Treatment: Acute on chronic systolic (congestive) heart failure    Diagnosis: S/P CABG (coronary artery bypass graft)  Assessment and Plan of Treatment:

## 2019-11-19 NOTE — DISCHARGE NOTE PROVIDER - NSDCCPTREATMENT_GEN_ALL_CORE_FT
PRINCIPAL PROCEDURE  Procedure: US Doppler guided insertion of central venous catheter  Findings and Treatment:

## 2019-11-19 NOTE — DISCHARGE NOTE PROVIDER - NSDCMRMEDTOKEN_GEN_ALL_CORE_FT
acetaminophen 325 mg oral tablet: 2 tab(s) orally every 6 hours, As needed, Mild Pain (1 - 3)  ascorbic acid 500 mg oral tablet: 1 tab(s) orally once a day  aspirin 81 mg oral tablet, chewable: 1 tab(s) orally once a day  atorvastatin 40 mg oral tablet: 1 tab(s) orally once a day (at bedtime)  carvedilol 3.125 mg oral tablet: 3 tab(s) orally every 12 hours  furosemide 20 mg oral tablet: 1 tab(s) orally once a day  heparin: 5000 unit(s) subcutaneous every 8 hours  hydrALAZINE 10 mg oral tablet: 1 tab(s) orally every 8 hours  isosorbide dinitrate 5 mg oral tablet: 1 tab(s) orally 3 times a day  lactobacillus acidophilus oral capsule: 1 tab(s) orally 3 times a day  megestrol 40 mg/mL oral suspension: 20 milliliter(s) orally once a day  metFORMIN 1000 mg oral tablet: 1 tab(s) orally every 12 hours  mirtazapine 7.5 mg oral tablet: 1 tab(s) orally once a day (at bedtime)  nystatin 100,000 units/g topical cream: 1 application topically 2 times a day  pantoprazole 40 mg oral delayed release tablet: 1 tab(s) orally once a day (before a meal)  sacubitril-valsartan 24 mg-26 mg oral tablet: 1 tab(s) orally 2 times a day  sertraline 25 mg oral tablet: 1 tab(s) orally once a day  tiotropium 18 mcg inhalation capsule: 1 cap(s) inhaled once a day

## 2019-11-19 NOTE — DISCHARGE NOTE PROVIDER - PROVIDER TOKENS
PROVIDER:[TOKEN:[[id number]],SCHEDULEDAPPT:[11/25/2019],SCHEDULEDAPPTTIME:[03:00 PM]],PROVIDER:[TOKEN:[[id number]],SCHEDULEDAPPT:[11/22/2019]],PROVIDER:[TOKEN:[[id number]],FOLLOWUP:[2 weeks]],PROVIDER:[TOKEN:[[id number]],FOLLOWUP:[1 month]]

## 2019-11-19 NOTE — PROGRESS NOTE ADULT - SUBJECTIVE AND OBJECTIVE BOX
CTU Attending Progress Daily Note     19 Nov 2019 10:20  Admited 11-02-19, Hospital Day 17d    HPI:  67 y/o F with h/o CAD s/p CABG, ICM, chronic systolic heart failure with EF ~ 25% coming today with rash in RLE and pain. Patient can't bear any weight. The pain started suddenly. She also notes some SOB but this is not that significant compared to her baseline. She can walk minimally around the house before getting SOB. She denies PND or orthopnea but has pedal edema. No LOC, palpitations, bleeding episodes. (03 Nov 2019 07:05)    Home Medications:  acetaminophen 325 mg oral tablet: 2 tab(s) orally every 6 hours, As needed, Mild Pain (1 - 3) (18 Oct 2019 14:37)    FAMILY HISTORY:  FH: myocardial infarction  FH: stomach cancer    PAST MEDICAL & SURGICAL HISTORY:  Female bladder prolapse  Diabetes  History of repair of hip fracture    Interval event for past 24 hr:  GUS WILBURN  68y had no event.   Current Complains:  GUS WILBURN has no new complains  Allergies    No Known Allergies    Intolerances      OBJECTIVE:  Vitals last 24 hrs  T(C): 36.8 (11-19-19 @ 08:00), Max: 37.1 (11-18-19 @ 12:00)  T(F): 98.2 (11-19-19 @ 08:00), Max: 98.7 (11-18-19 @ 12:00)  HR: 81 (11-19-19 @ 08:00) (81 - 97)  BP: 101/58 (11-19-19 @ 08:00) (101/58 - 147/70)  ABP: --  ABP(mean): --  RR: 21 (11-19-19 @ 08:00) (19 - 38)  SpO2: 97% (11-19-19 @ 08:00) (96% - 100%)      11-18-19 @ 07:01  -  11-19-19 @ 07:00  --------------------------------------------------------  IN:    Oral Fluid: 330 mL  Total IN: 330 mL    OUT:    Indwelling Catheter - Urethral: 1278 mL  Total OUT: 1278 mL    Total NET: -948 mL          CAPILLARY BLOOD GLUCOSE      POCT Blood Glucose.: 86 mg/dL (19 Nov 2019 06:33)  POCT Blood Glucose.: 104 mg/dL (18 Nov 2019 21:11)  POCT Blood Glucose.: 109 mg/dL (18 Nov 2019 16:17)  POCT Blood Glucose.: 144 mg/dL (18 Nov 2019 11:08)    LABS:                          10.4   7.42  )-----------( 214      ( 18 Nov 2019 23:30 )             33.8     Hemoglobin: 10.4 g/dL (11-18 @ 23:30)  Hemoglobin: 10.2 g/dL (11-18 @ 03:00)    11-18    148<H>  |  113<H>  |  30<H>  ----------------------------<  100<H>  5.6<H>   |  22  |  1.1    Ca    9.2      18 Nov 2019 23:30      Creatinine, Serum: 1.1 mg/dL (11-18 @ 23:30)  Creatinine, Serum: 1.0 mg/dL (11-18 @ 03:00)  Creatinine, Serum: 1.1 mg/dL (11-17 @ 03:50)  Creatinine, Serum: 1.3 mg/dL (11-16 @ 14:58)  Creatinine, Serum: 1.3 mg/dL (11-16 @ 00:03)          HOSPITAL MEDICATIONS:  MEDICATIONS  (STANDING):  acetaminophen  IVPB .. 1000 milliGRAM(s) IV Intermittent once  ALBUTerol    0.083% 2.5 milliGRAM(s) Nebulizer every 6 hours  ascorbic acid 500 milliGRAM(s) Oral daily  aspirin  chewable 81 milliGRAM(s) Oral daily  atorvastatin 40 milliGRAM(s) Oral at bedtime  carvedilol 9.375 milliGRAM(s) Oral every 12 hours  dextrose 5%. 1000 milliLiter(s) (50 mL/Hr) IV Continuous <Continuous>  dextrose 50% Injectable 12.5 Gram(s) IV Push once  dextrose 50% Injectable 25 Gram(s) IV Push once  dextrose 50% Injectable 25 Gram(s) IV Push once  furosemide    Tablet 20 milliGRAM(s) Oral daily  heparin  Injectable 5000 Unit(s) SubCutaneous every 8 hours  insulin lispro (HumaLOG) corrective regimen sliding scale   SubCutaneous three times a day before meals  lactobacillus acidophilus 1 Tablet(s) Oral three times a day with meals  megestrol Suspension 800 milliGRAM(s) Oral daily  metFORMIN 1000 milliGRAM(s) Oral every 12 hours  mirtazapine 7.5 milliGRAM(s) Oral at bedtime  nystatin Cream 1 Application(s) Topical two times a day  pantoprazole    Tablet 40 milliGRAM(s) Oral before breakfast  sacubitril 49 mG/valsartan 51 mG 1 Tablet(s) Oral two times a day  sertraline 25 milliGRAM(s) Oral daily  sodium chloride 0.9% lock flush 3 milliLiter(s) IV Push every 8 hours  sodium chloride 0.9%. 1000 milliLiter(s) (500 mL/Hr) IV Continuous <Continuous>  tiotropium 18 MICROgram(s) Capsule 1 Capsule(s) Inhalation daily    MEDICATIONS  (PRN):  acetaminophen   Tablet .. 650 milliGRAM(s) Oral every 6 hours PRN Mild Pain (1 - 3)  dextrose 40% Gel 15 Gram(s) Oral once PRN Blood Glucose LESS THAN 70 milliGRAM(s)/deciliter  glucagon  Injectable 1 milliGRAM(s) IntraMuscular once PRN Glucose LESS THAN 70 milligrams/deciliter  loperamide 2 milliGRAM(s) Oral every 6 hours PRN Diarrhea  ondansetron Injectable 4 milliGRAM(s) IV Push every 8 hours PRN Nausea and/or Vomiting      REVIEW OF SYSTEMS:  CONSTITUTIONAL: [X] all negative; [ ] weakness, [ ] fevers, [ ] chills  EYES/ENT: [X] all negative; [ ] visual changes, [ ] vertigo, [ ] throat pain, [ ] eye pain  NECK: [X] all negative; [ ] pain, [ ] stiffness  RESPIRATORY: [ ] all negative, [ ] cough, [ ] wheezing, [ ] hemoptysis, [x ] shortness of breath  CARDIOVASCULAR: [ ] all negative; [ ] chest pain, [ ] palpitations, [ ] orthopnea  GASTROINTESTINAL: [ ] all negative; [ ]abdominal pain, [ ] nausea, [ ] vomiting, [ ] hematemesis, [x ] diarrhea, [ ] constipation, [ ] melena, [ ] hematochezia.  GENITOURINARY: [X] all negative; [ ] dysuria, [ ] frequency, [ ] hematuria  NEUROLOGICAL: [X] all negative; [ ] numbness, [ ] weakness, [ ] paresthesias  MUSCULOSKELETAL: [X] all negative, [ ] joint pain, [ ] joint swelling, [ ] joint redness, [ ] bone pain  SKIN: [X] all negative; [ ] itching, [ ] burning, [ ] rashes, [ ] lesions   All other review of systems is negative unless indicated above.    [  ] Unable to assess ROS because     PHYSICAL EXAM:          CONSTITUTIONAL: Well-developed; well-nourished; in no acute distress.   	SKIN: warm, dry, no rashes or lesions, stage II sacral decubitus   	HEENT: Atraumatic. Normocephalic. PERRL. Moist membranes, no conj injection, sclera clear  	NECK: Supple; non tender.  No JVD. No lymphadenopathy.  	CARD: Normal S1, S2. Rate and Rhythm are regular. No murmurs.  	RESP: decreased BS on left base, no wheezes, no rales no rhonchi.  	ABD: Soft, not tender, not distended, no CVA ttp no rebound no guarding, bowel sounds present  	EXT: Normal ROM.  No clubbing, no cyanosis, no pedal edema, no calf pain b/l, Peripheral pulses intact.  	LYMPH: No acute cervical adenopathy.  	NEURO: Alert, awake, motor 5/5 R, 5/5 L, sensation intact bilat, CN 2-12 intact,          PSYCH: Cooperative, appropriate. Alert & oriented x 3    RADIOLOGY:  X Reviewed and interpreted by me  CxR from 11-19-19 shows mild congestion, no pneumothorax, SMALL LEFT effusion, no cardiomegaly,       ECG:  X Reviewed and interpreted by me - NSR

## 2019-11-19 NOTE — DISCHARGE NOTE PROVIDER - HOSPITAL COURSE
68 year-old female with PMH of DM, bladder prolapse, hx of hip fx, stomach ca,  recent CABG, now presents with right lower extremity pain and swelling, as well as shortness of breath and RA sat 90%. Pt found to have right pleural effusion which was drained on 11/1/19 1400cc serosanginous fluid, repeat cxr on 11/2/19 showed right pleural effusion and pulm edema. pt also has cellulities of right lower extremity placed on iv abx. On presentation her Cr 1.8. Patient was admitted for acute exacerbation of chronic systolic heart failure. She had right pigtail placed on 11/4/2019, with persistent drainage of serosanginous fluid. Pt had pleurodesis on 11/15/2019. Patient had bhumika which resolved during her hospital stay. She was transfused for anemia, secondary to acute surgical blood loss. Pt left lower leg was most likely due to neuropathy pain, not cellulitis and iv abx was stopped. Bilateral duplex was done to r/o DVT. Patient was optimized on her heart failure regimen of lasix, coreg, isordil, entresto, and hydralazine. Patient was discharged to SNF on HD #19 in stable condition with follow up to see Dr. Escobar on 11/25/2019 @ 3:00pm. Patient is also instructed to follow up with Dr Gandara from Heart Failure on friday 11/22/2019. Patient will have repeat bmp as outpatient to follow electrolytes.

## 2019-11-19 NOTE — DISCHARGE NOTE PROVIDER - NSCORESITESY/N_GEN_A_CORE_RD
Refill request received from pharmacy. Medication pending for approval.     Patient information below:      Hemoglobin A1C (%)   Date Value   07/10/2018 8.9   03/30/2017 7.5 (H)   11/29/2016 8.5     LDL Calculated (mg/dL)   Date Value   07/10/2018 65     AST (U/L)   Date Value   03/30/2017 32     ALT (U/L)   Date Value   03/30/2017 21     BUN (mg/dL)   Date Value   03/30/2017 13      (goal A1C is < 7)   (goal LDL is <100) need 30-50% reduction from baseline     BP Readings from Last 3 Encounters:   07/10/18 138/68   07/09/18 132/82   11/16/17 138/66    (goal /80)      All Future Testing planned in [facility]:  Lab Frequency Next Occurrence       Last Office Visit With PCP:  7/10/2018      Next Visit Date:  No future appointments.          Patient Active Problem List:     HTN (hypertension)     Diabetes mellitus ([facility] [address]     Lipid disorder     Screening for breast cancer     Grief at loss of child     Anxiety and depression     Mixed hyperlipidemia
No

## 2019-11-19 NOTE — DISCHARGE NOTE PROVIDER - NSDCACTIVITY_GEN_ALL_CORE
No heavy lifting/straining/Walking - Outdoors allowed/Stairs allowed/Showering allowed/Do not drive or operate machinery/Walking - Indoors allowed

## 2019-11-19 NOTE — DISCHARGE NOTE PROVIDER - CARE PROVIDERS DIRECT ADDRESSES
,[email or web address],DirectAddress_Unknown,DirectAddress_Unknown

## 2019-11-19 NOTE — DISCHARGE NOTE PROVIDER - CARE PROVIDER_API CALL
Nelson Escobar)  Surgery; Thoracic and Cardiac Surgery  [address], [address]  [place], [place] [id number]  Phone: [phone]  Fax: [phone]  Scheduled Appointment: 11/25/2019 03:00 PM    Santy Simmons; MD)  Cardiovascular Disease; Internal Medicine  [address] [place] [address]  Phone: [phone]  Fax: [phone]  Scheduled Appointment: 11/22/2019    Ad Mancia)  Cardiology; Interventional Cardiology  [address] [place] [address]  Phone: [phone]  Fax: [phone]  Follow Up Time: 2 weeks    Yogesh Barbosa)  Medicine  [address] [place] [address]  Phone: [phone]  Fax: [phone]  Follow Up Time: 1 month

## 2019-11-19 NOTE — PROGRESS NOTE ADULT - SUBJECTIVE AND OBJECTIVE BOX
OPERATIVE PROCEDURE(s):                POD #                       68yFemale  SURGEON(s): CHINA Escobar  SUBJECTIVE ASSESSMENT:   Vital Signs Last 24 Hrs  T(F): 98.2 (19 Nov 2019 08:00), Max: 98.7 (18 Nov 2019 12:00)  HR: 81 (19 Nov 2019 08:00) (81 - 97)  BP: 101/58 (19 Nov 2019 08:00) (101/58 - 147/70)  BP(mean): 76 (19 Nov 2019 08:00) (76 - 102)  RR: 21 (19 Nov 2019 08:00) (19 - 38)  SpO2: 97% (19 Nov 2019 08:00) (96% - 100%)    I&O's Detail    18 Nov 2019 07:01  -  19 Nov 2019 07:00  --------------------------------------------------------  IN:    Oral Fluid: 330 mL  Total IN: 330 mL    OUT:    Indwelling Catheter - Urethral: 1278 mL  Total OUT: 1278 mL        Net:   I&O's Detail    17 Nov 2019 07:01  -  18 Nov 2019 07:00  --------------------------------------------------------  Total NET: -1002 mL      18 Nov 2019 07:01  -  19 Nov 2019 07:00  --------------------------------------------------------  Total NET: -948 mL        CAPILLARY BLOOD GLUCOSE      POCT Blood Glucose.: 86 mg/dL (19 Nov 2019 06:33)  POCT Blood Glucose.: 104 mg/dL (18 Nov 2019 21:11)  POCT Blood Glucose.: 109 mg/dL (18 Nov 2019 16:17)  POCT Blood Glucose.: 144 mg/dL (18 Nov 2019 11:08)    Physical Exam:  General: NAD; A&Ox3/Patient is intubated and sedated  Cardiac: S1/S2, RRR, no murmur, no rubs  Lungs: unlabored respirations, CTA b/l, no wheeze, no rales, no crackles  Abdomen: Soft/NT/ND; positive bowel sounds x 4  Sternum: Intact, no click, incision healing well with no drainage  Incisions: Incisions clean/dry/intact  Extremities: No edema b/l lower extremities; good capillary refill; no cyanosis; palpable 1+ pedal pulses b/l    Central Venous Catheter: Yes[]  No[] , If Yes indication:           Day #  Castellon Catheter: Yes  [] , No  [] , If yes indication:                      Day #  NGT: Yes [] No [] ,    If Yes Placement:                                     Day #  EPICARDIAL WIRES:  [] YES [] NO                                              Day #  BOWEL MOVEMENT:  [] YES [] NO, If No, Timing since last BM:      Day #  CHEST TUBE(Left/Right):  [] YES [] NO, If yes -  AIR LEAKS:  [] YES [] NO        LABS:                        10.4<L>  7.42  )-----------( 214      ( 18 Nov 2019 23:30 )             33.8<L>                        10.2<L>  8.43  )-----------( 211      ( 18 Nov 2019 03:00 )             33.2<L>    11-18    148<H>  |  113<H>  |  30<H>  ----------------------------<  100<H>  5.6<H>   |  22  |  1.1  11-18    146  |  112<H>  |  34<H>  ----------------------------<  125<H>  5.1<H>   |  22  |  1.0    Ca    9.2      18 Nov 2019 23:30            RADIOLOGY & ADDITIONAL TESTS:  CXR:  EKG:  MEDICATIONS  (STANDING):  acetaminophen  IVPB .. 1000 milliGRAM(s) IV Intermittent once  ALBUTerol    0.083% 2.5 milliGRAM(s) Nebulizer every 6 hours  ascorbic acid 500 milliGRAM(s) Oral daily  aspirin  chewable 81 milliGRAM(s) Oral daily  atorvastatin 40 milliGRAM(s) Oral at bedtime  carvedilol 9.375 milliGRAM(s) Oral every 12 hours  dextrose 5%. 1000 milliLiter(s) (50 mL/Hr) IV Continuous <Continuous>  dextrose 50% Injectable 12.5 Gram(s) IV Push once  dextrose 50% Injectable 25 Gram(s) IV Push once  dextrose 50% Injectable 25 Gram(s) IV Push once  furosemide    Tablet 20 milliGRAM(s) Oral daily  heparin  Injectable 5000 Unit(s) SubCutaneous every 8 hours  insulin lispro (HumaLOG) corrective regimen sliding scale   SubCutaneous three times a day before meals  lactobacillus acidophilus 1 Tablet(s) Oral three times a day with meals  megestrol Suspension 800 milliGRAM(s) Oral daily  metFORMIN 1000 milliGRAM(s) Oral every 12 hours  mirtazapine 7.5 milliGRAM(s) Oral at bedtime  nystatin Cream 1 Application(s) Topical two times a day  pantoprazole    Tablet 40 milliGRAM(s) Oral before breakfast  sacubitril 49 mG/valsartan 51 mG 1 Tablet(s) Oral two times a day  sertraline 25 milliGRAM(s) Oral daily  sodium chloride 0.9% lock flush 3 milliLiter(s) IV Push every 8 hours  sodium chloride 0.9%. 1000 milliLiter(s) (500 mL/Hr) IV Continuous <Continuous>  tiotropium 18 MICROgram(s) Capsule 1 Capsule(s) Inhalation daily    MEDICATIONS  (PRN):  acetaminophen   Tablet .. 650 milliGRAM(s) Oral every 6 hours PRN Mild Pain (1 - 3)  dextrose 40% Gel 15 Gram(s) Oral once PRN Blood Glucose LESS THAN 70 milliGRAM(s)/deciliter  glucagon  Injectable 1 milliGRAM(s) IntraMuscular once PRN Glucose LESS THAN 70 milligrams/deciliter  loperamide 2 milliGRAM(s) Oral every 6 hours PRN Diarrhea  ondansetron Injectable 4 milliGRAM(s) IV Push every 8 hours PRN Nausea and/or Vomiting    HEPARIN:  [] YES [] NO  Dose: XX UNITS/HR UNITS Q8H  LOVENOX:[] YES [] NO  Dose: XX mg Q24H  COUMADIN: []  YES [] NO  Dose: XX mg  Q24H  SCD's: YES b/l  GI Prophylaxis: Protonix [], Pepcid [], None [], (Contra-indication:.....)    Post-Op Beta-Blockers: Yes [], No[], If No, then contraindication:  Post-Op Aspirin: Yes [],  No [], If No, then contraindication:  Post-Op Statin: Yes [], No[], If No, then contraindication:  Allergies    No Known Allergies    Intolerances      Ambulation/Activity Status:    Assessment/Plan:  68y Female status-post .....  - Case and plan discussed with CTU Intensivist and CT Surgeon - Dr. Urbano/Flaco/Shawn   - Continue CTU supportive care    - Continue DVT/GI prophylaxis  - Incentive Spirometry 10 times an hour  - Continue to advance physical activity as tolerated and continue PT/OT as directed  1. CAD: Continue ASA, statin, BB  2. HTN:   3. A. Fib:   4. COPD/Hypoxia:   5. DM/Glucose Control:     Social Service Disposition: OPERATIVE PROCEDURE(s): CABGx3 on 10/7, readmit with CHF exacerbation                POD #                       68yFemale  SURGEON(s): CHINA Escobar  SUBJECTIVE ASSESSMENT: pt seen and examined. no acute complaints at this time.   Vital Signs Last 24 Hrs  T(F): 98.2 (19 Nov 2019 08:00), Max: 98.7 (18 Nov 2019 12:00)  HR: 81 (19 Nov 2019 08:00) (81 - 97)  BP: 101/58 (19 Nov 2019 08:00) (101/58 - 147/70)  BP(mean): 76 (19 Nov 2019 08:00) (76 - 102)  RR: 21 (19 Nov 2019 08:00) (19 - 38)  SpO2: 97% (19 Nov 2019 08:00) (96% - 100%)    I&O's Detail    18 Nov 2019 07:01  -  19 Nov 2019 07:00  --------------------------------------------------------  IN:    Oral Fluid: 330 mL  Total IN: 330 mL    OUT:    Indwelling Catheter - Urethral: 1278 mL  Total OUT: 1278 mL        Net:   I&O's Detail    17 Nov 2019 07:01  -  18 Nov 2019 07:00  --------------------------------------------------------  Total NET: -1002 mL      18 Nov 2019 07:01  -  19 Nov 2019 07:00  --------------------------------------------------------  Total NET: -948 mL        CAPILLARY BLOOD GLUCOSE      POCT Blood Glucose.: 86 mg/dL (19 Nov 2019 06:33)  POCT Blood Glucose.: 104 mg/dL (18 Nov 2019 21:11)  POCT Blood Glucose.: 109 mg/dL (18 Nov 2019 16:17)  POCT Blood Glucose.: 144 mg/dL (18 Nov 2019 11:08)    Physical Exam:  General: NAD; A&Ox3/Patient is intubated and sedated  Cardiac: S1/S2, RRR, no murmur, no rubs  Lungs: decreased bs at bases bilaterally  Abdomen: Soft/NT/ND; positive bowel sounds x 4  Sternum: Intact, no click, incision healing well with no drainage  Incisions: Incisions clean/dry/intact  Extremities: No edema b/l lower extremities; good capillary refill; no cyanosis; palpable 1+ pedal pulses b/l    Central Venous Catheter: Yes[]  No[x] , If Yes indication:           Day #  Montejo Catheter: Yes  [x] , No  [] , If yes indication:   bladder prolasp                   Day # chronic montejo   NGT: Yes [] No [x] ,    If Yes Placement:                                     Day #  EPICARDIAL WIRES:  [] YES [x] NO                                              Day #  BOWEL MOVEMENT:  [x] YES [] NO, If No, Timing since last BM:      Day #  CHEST TUBE(Left/Right):  [] YES [x] NO, If yes -  AIR LEAKS:  [] YES [] NO        LABS:                        10.4<L>  7.42  )-----------( 214      ( 18 Nov 2019 23:30 )             33.8<L>                        10.2<L>  8.43  )-----------( 211      ( 18 Nov 2019 03:00 )             33.2<L>    11-18    148<H>  |  113<H>  |  30<H>  ----------------------------<  100<H>  5.6<H>   |  22  |  1.1  11-18    146  |  112<H>  |  34<H>  ----------------------------<  125<H>  5.1<H>   |  22  |  1.0    Ca    9.2      18 Nov 2019 23:30            RADIOLOGY & ADDITIONAL TESTS:  CXR: < from: Xray Chest 1 View-PORTABLE IMMEDIATE (11.18.19 @ 11:50) >  Since November 18, 2019 at 4:05 AM, unchanged small right apical   pneumothorax and interval removal of the right-sided pleural pigtail   catheter.    Unchanged bilateral pleural effusions and bibasilar opacities.    < end of copied text >    MEDICATIONS  (STANDING):  acetaminophen  IVPB .. 1000 milliGRAM(s) IV Intermittent once  ALBUTerol    0.083% 2.5 milliGRAM(s) Nebulizer every 6 hours  ascorbic acid 500 milliGRAM(s) Oral daily  aspirin  chewable 81 milliGRAM(s) Oral daily  atorvastatin 40 milliGRAM(s) Oral at bedtime  carvedilol 9.375 milliGRAM(s) Oral every 12 hours  dextrose 5%. 1000 milliLiter(s) (50 mL/Hr) IV Continuous <Continuous>  dextrose 50% Injectable 12.5 Gram(s) IV Push once  dextrose 50% Injectable 25 Gram(s) IV Push once  dextrose 50% Injectable 25 Gram(s) IV Push once  furosemide    Tablet 20 milliGRAM(s) Oral daily  heparin  Injectable 5000 Unit(s) SubCutaneous every 8 hours  insulin lispro (HumaLOG) corrective regimen sliding scale   SubCutaneous three times a day before meals  lactobacillus acidophilus 1 Tablet(s) Oral three times a day with meals  megestrol Suspension 800 milliGRAM(s) Oral daily  metFORMIN 1000 milliGRAM(s) Oral every 12 hours  mirtazapine 7.5 milliGRAM(s) Oral at bedtime  nystatin Cream 1 Application(s) Topical two times a day  pantoprazole    Tablet 40 milliGRAM(s) Oral before breakfast  sacubitril 49 mG/valsartan 51 mG 1 Tablet(s) Oral two times a day  sertraline 25 milliGRAM(s) Oral daily  sodium chloride 0.9% lock flush 3 milliLiter(s) IV Push every 8 hours  sodium chloride 0.9%. 1000 milliLiter(s) (500 mL/Hr) IV Continuous <Continuous>  tiotropium 18 MICROgram(s) Capsule 1 Capsule(s) Inhalation daily    MEDICATIONS  (PRN):  acetaminophen   Tablet .. 650 milliGRAM(s) Oral every 6 hours PRN Mild Pain (1 - 3)  dextrose 40% Gel 15 Gram(s) Oral once PRN Blood Glucose LESS THAN 70 milliGRAM(s)/deciliter  glucagon  Injectable 1 milliGRAM(s) IntraMuscular once PRN Glucose LESS THAN 70 milligrams/deciliter  loperamide 2 milliGRAM(s) Oral every 6 hours PRN Diarrhea  ondansetron Injectable 4 milliGRAM(s) IV Push every 8 hours PRN Nausea and/or Vomiting    HEPARIN:  [x] YES [] NO  Dose: 5000 UNITS Q8H  LOVENOX:[] YES [x] NO  Dose: XX mg Q24H  COUMADIN: []  YES [x] NO  Dose: XX mg  Q24H  SCD's: YES b/l  GI Prophylaxis: Protonix [x], Pepcid [], None [], (Contra-indication:.....)    Post-Op Beta-Blockers: Yes [x], No[], If No, then contraindication:  Post-Op Aspirin: Yes [x],  No [], If No, then contraindication:  Post-Op Statin: Yes [x], No[], If No, then contraindication:  Allergies    No Known Allergies    Intolerances      Ambulation/Activity Status: ambulate     Assessment/Plan:  68y Female status-post 68y Female status-post CABGx3 on 10/7/2019 readmit with right pleural effusion, most likely secondary to heart failure  - Case and plan discussed with CTU Intensivist and CT Surgeon - Dr. Urbano/Flaco/Shawn   - Continue CTU supportive care    - Continue DVT/GI prophylaxis  - Incentive Spirometry 10 times an hour  - Continue to advance physical activity as tolerated and continue PT/OT as directed  1. CAD: Continue ASA, statin, BB  2. chronic systolic heart failure- cont coreg, entresto, lasix, lifevest prior to discharge  3. COPD/Hypoxia: nebs, wean o2 as tolerated  4. DM/Glucose Control: cont lantus 20 humalog sliding scale  5. hyperkalemia: repeat bmp this am prior to discharge, may have to adjust heart failure meds      Social Service Disposition:  arturo

## 2019-11-19 NOTE — PROGRESS NOTE ADULT - ASSESSMENT
PROBLEMS:  I spent 45 minutes examining patient, reviewing vitals, labs, medications, imaging and discussing with the team goals of care to prevent life-threatening in this patient who is at high risk for deterioration or death due to:    1.	chronic CHF - on coreg ( increse dose to 9.375 ), entresto,  lasix   2.	RIC - resolving - monitor.    3.	Hyperkalemia - monitor, off spironolactone, lower dose of entresto and add hydralazine and isodil, will try Valtessa   4.	Pleural effusions - s/p Rt pleurodesis - resolved   5.	Small Right pneumothorax - stable  6.	Stage 2 sacral decubiti - local care  7.	loose BM - resolved     PLAN  Neuro: move all 4 extremities. no sensory or motor deficits  Pain management.   acetaminophen   Tablet .. 650 milliGRAM(s) Oral every 6 hours PRN  acetaminophen  IVPB .. 1000 milliGRAM(s) IV Intermittent once  mirtazapine 7.5 milliGRAM(s) Oral at bedtime  ondansetron Injectable 4 milliGRAM(s) IV Push every 8 hours PRN  sertraline 25 milliGRAM(s) Oral daily    Pulm: Wean off supplemental oxygen as able. Daily CXR. Encourage coughing, deep breathing and use of incentive spirometry.   ALBUTerol    0.083% 2.5 milliGRAM(s) Nebulizer every 6 hours  tiotropium 18 MICROgram(s) Capsule 1 Capsule(s) Inhalation daily    Cardio: Monitor telemetry/alarms. Continue supportive care   carvedilol 9.375 milliGRAM(s) Oral every 12 hours  furosemide    Tablet 20 milliGRAM(s) Oral daily  sacubitril 49 mG/valsartan 51 mG 1 Tablet(s) Oral two times a day    GI: Continue stool softeners.    loperamide 2 milliGRAM(s) Oral every 6 hours PRN  pantoprazole    Tablet 40 milliGRAM(s) Oral before breakfast    Nutrition: Continue present diet  Endocrine and glucose control:   atorvastatin 40 milliGRAM(s) Oral at bedtime  dextrose 40% Gel 15 Gram(s) Oral once PRN  dextrose 50% Injectable 12.5 Gram(s) IV Push once  dextrose 50% Injectable 25 Gram(s) IV Push once  dextrose 50% Injectable 25 Gram(s) IV Push once  glucagon  Injectable 1 milliGRAM(s) IntraMuscular once PRN  insulin lispro (HumaLOG) corrective regimen sliding scale   SubCutaneous three times a day before meals  megestrol Suspension 800 milliGRAM(s) Oral daily  metFORMIN 1000 milliGRAM(s) Oral every 12 hours    Renal: monitor urine output, supplement electrolytes as needed,     Vasc: Heparin SC and/or SCDs for DVT prophylaxis  aspirin  chewable 81 milliGRAM(s) Oral daily  heparin  Injectable 5000 Unit(s) SubCutaneous every 8 hours    ID: Stable, no fever , no chills. Off antibiotics.    Tubes: Monitor Chest tube output  Therapy: OOB/ambulate  Disposition: start planing discharge home or placement    Pertinent clinical, laboratory, radiographic, hemodynamic, echocardiographic, respiratory data, microbiologic data and chart were reviewed and analyzed frequently throughout the course of the day and night. GI and DVT prophylaxis, glycemic control, head of bed elevation and skin care issues were addressed.  Patient seen, examined and plan discussed with CT Surgery / CTICU team during rounds.    [ ] The patient remains in critical and unstable condition, and requires ICU care and monitoring  [x ] The patient is improving but requires continued monitoring and adjustment of therapy

## 2019-11-20 ENCOUNTER — OUTPATIENT (OUTPATIENT)
Dept: OUTPATIENT SERVICES | Facility: HOSPITAL | Age: 69
LOS: 1 days | Discharge: HOME | End: 2019-11-20

## 2019-11-20 DIAGNOSIS — Z98.890 OTHER SPECIFIED POSTPROCEDURAL STATES: Chronic | ICD-10-CM

## 2019-11-20 DIAGNOSIS — R79.9 ABNORMAL FINDING OF BLOOD CHEMISTRY, UNSPECIFIED: ICD-10-CM

## 2019-11-21 ENCOUNTER — APPOINTMENT (OUTPATIENT)
Dept: CARE COORDINATION | Facility: HOME HEALTH | Age: 69
End: 2019-11-21
Payer: MEDICARE

## 2019-11-21 DIAGNOSIS — D62 ACUTE POSTHEMORRHAGIC ANEMIA: ICD-10-CM

## 2019-11-21 DIAGNOSIS — J44.9 CHRONIC OBSTRUCTIVE PULMONARY DISEASE, UNSPECIFIED: ICD-10-CM

## 2019-11-21 DIAGNOSIS — I48.91 UNSPECIFIED ATRIAL FIBRILLATION: ICD-10-CM

## 2019-11-21 DIAGNOSIS — E87.3 ALKALOSIS: ICD-10-CM

## 2019-11-21 DIAGNOSIS — I50.23 ACUTE ON CHRONIC SYSTOLIC (CONGESTIVE) HEART FAILURE: ICD-10-CM

## 2019-11-21 DIAGNOSIS — J90 PLEURAL EFFUSION, NOT ELSEWHERE CLASSIFIED: ICD-10-CM

## 2019-11-21 DIAGNOSIS — Z95.1 PRESENCE OF AORTOCORONARY BYPASS GRAFT: ICD-10-CM

## 2019-11-21 DIAGNOSIS — L89.152 PRESSURE ULCER OF SACRAL REGION, STAGE 2: ICD-10-CM

## 2019-11-21 DIAGNOSIS — J93.9 PNEUMOTHORAX, UNSPECIFIED: ICD-10-CM

## 2019-11-21 DIAGNOSIS — R06.02 SHORTNESS OF BREATH: ICD-10-CM

## 2019-11-21 DIAGNOSIS — E43 UNSPECIFIED SEVERE PROTEIN-CALORIE MALNUTRITION: ICD-10-CM

## 2019-11-21 DIAGNOSIS — J15.6 PNEUMONIA DUE TO OTHER GRAM-NEGATIVE BACTERIA: ICD-10-CM

## 2019-11-21 DIAGNOSIS — F43.21 ADJUSTMENT DISORDER WITH DEPRESSED MOOD: ICD-10-CM

## 2019-11-21 DIAGNOSIS — E11.40 TYPE 2 DIABETES MELLITUS WITH DIABETIC NEUROPATHY, UNSPECIFIED: ICD-10-CM

## 2019-11-21 DIAGNOSIS — E87.5 HYPERKALEMIA: ICD-10-CM

## 2019-11-21 DIAGNOSIS — N17.9 ACUTE KIDNEY FAILURE, UNSPECIFIED: ICD-10-CM

## 2019-11-21 DIAGNOSIS — Z85.028 PERSONAL HISTORY OF OTHER MALIGNANT NEOPLASM OF STOMACH: ICD-10-CM

## 2019-11-21 DIAGNOSIS — G62.9 POLYNEUROPATHY, UNSPECIFIED: ICD-10-CM

## 2019-11-21 DIAGNOSIS — J98.11 ATELECTASIS: ICD-10-CM

## 2019-11-21 PROCEDURE — 99024 POSTOP FOLLOW-UP VISIT: CPT

## 2019-11-22 ENCOUNTER — APPOINTMENT (OUTPATIENT)
Dept: CARDIOLOGY | Facility: CLINIC | Age: 69
End: 2019-11-22
Payer: MEDICARE

## 2019-11-22 PROCEDURE — 93005 ELECTROCARDIOGRAM TRACING: CPT

## 2019-11-22 PROCEDURE — 99215 OFFICE O/P EST HI 40 MIN: CPT

## 2019-11-22 PROCEDURE — 93010 ELECTROCARDIOGRAM REPORT: CPT

## 2019-11-22 PROCEDURE — 93000 ELECTROCARDIOGRAM COMPLETE: CPT

## 2019-11-23 NOTE — HISTORY OF PRESENT ILLNESS
[FreeTextEntry1] : 69 y/o F with h/o DM, paroxysmal afib, CAD s/p recent MI and diagnosis of triple vessel disease with severely depressed LV systolic function and acute heart failure. Patient underwent 3v-CABG (LIMA-LAD, SVG-OM and SVG-PDA), post operative course was complicated by persistent symptoms of heart failure, Pleural effusion that required thoracentesis x2, GIB and urinary retention. She also had paroxysmal afib post Op. She was started on amiodarone. No A/C given while hospitalized due to GIB. Patient was discharged on 10/18 home with a montejo catheter. Her LVEF at discharge was ~ 20% with a restrictive filling pattern. \par \par She was readmitted less than two weeks after discharge due pain and swelling in RLE and reaccumulation of pleural effusion. She was treated with Abx for pressumed infection for the leg swelling and got another thoracentesis followed by pleurodesis for the effusion. She was discharged to [facility]  and comes today for follow up. \par \par On review of her meds, it seems that she hasn't been getting the carvedilol 9.375 bid. She is getting the Entresto 24-26 mg bid, hydralazine 10 mg tid and isosorbide dinitrate tid. Reportedly her BP early this morning was in the 60s. Patient denies having any symptoms; she drank 4 glasses of water. She had been discharged on furosemide 20 mg daily. \par \par Later today after doing PT patient was shocked by her ICD. She denies any dizziness, lightheadedness, chest pain, sob before the shock. She says she didn't have time to push the button. \par \par \par \par \par \par \par During the week before today's visit, I adjusted the diuretics and losartan dose in collaboration with the CTS team. Since Monday, patient has lost ~ 6 lbs. She is taking losartan 25 mg bid (since yesterday) and Bumex 1 mg twice daily. She is also taking carvedilol 3.125 mg bid. \par \par Today, patient reports having significant symptoms of heart failure but this has remained stable to slightly improved since discharged. She gets SOB with minimal exertion and has to move around in a wheelchair. \par She has edema up to the thighs. \par

## 2019-11-23 NOTE — PHYSICAL EXAM
[Normal Appearance] : normal appearance [General Appearance - In No Acute Distress] : no acute distress [Normal Conjunctiva] : the conjunctiva exhibited no abnormalities [Normal Oral Mucosa] : normal oral mucosa [Heart Sounds] : normal S1 and S2 [Murmurs] : no murmurs present [Edema] : no peripheral edema present [Abdomen Soft] : soft [Abdomen Tenderness] : non-tender [Oriented To Time, Place, And Person] : oriented to person, place, and time [Affect] : the affect was normal [FreeTextEntry1] : No JVD

## 2019-11-23 NOTE — ASSESSMENT
[FreeTextEntry1] : 67 y/o F with h/o CAD with recent MI s/p CABG complicated by long hospital stay for management of heart failure, pleural effusion, AF, GIB, readmitted for leg pain and pleural effusion s/p pleurodesis coming today for follow up. Patient went to a SNF after discharge with a LifeVest. She reports feeling ok and is stable to perform the PT routines she is asked to do. Today at 10:11:43 am patient was shocked by her LifeVest. The rhythm was SVT at 150 bpm and lasted for 262 seconds. She reports no symptoms whatsoever before the episode. She couldn't push the abort button quick enough. \par \par She denies any SOB, PND, pedal edema. Her BP was low this morning but went up after drinking 4 glasses of water.  \par \par Of note, her carvedilol was stopped at the SNF after discharge. She was supposed to be on 9.375 mg bid.

## 2019-11-24 VITALS
RESPIRATION RATE: 12 BRPM | HEART RATE: 96 BPM | DIASTOLIC BLOOD PRESSURE: 60 MMHG | OXYGEN SATURATION: 98 % | SYSTOLIC BLOOD PRESSURE: 112 MMHG

## 2019-11-24 RX ORDER — ALBUTEROL SULFATE 90 UG/1
108 (90 BASE) AEROSOL, METERED RESPIRATORY (INHALATION)
Qty: 1 | Refills: 1 | Status: DISCONTINUED | COMMUNITY
Start: 2019-10-21 | End: 2019-11-24

## 2019-11-24 RX ORDER — AMIODARONE HYDROCHLORIDE 200 MG/1
200 TABLET ORAL
Refills: 0 | Status: DISCONTINUED | COMMUNITY
End: 2019-11-24

## 2019-11-24 RX ORDER — ZOLPIDEM TARTRATE 10 MG/1
10 TABLET ORAL
Qty: 30 | Refills: 0 | Status: DISCONTINUED | COMMUNITY
Start: 2019-08-20 | End: 2019-11-24

## 2019-11-24 RX ORDER — METHOCARBAMOL 500 MG/1
500 TABLET, FILM COATED ORAL TWICE DAILY
Qty: 14 | Refills: 0 | Status: DISCONTINUED | COMMUNITY
Start: 2019-10-29 | End: 2019-11-24

## 2019-11-24 RX ORDER — TAMSULOSIN HYDROCHLORIDE 0.4 MG/1
0.4 CAPSULE ORAL
Qty: 30 | Refills: 1 | Status: DISCONTINUED | COMMUNITY
Start: 2019-09-20 | End: 2019-11-24

## 2019-11-24 RX ORDER — FLUTICASONE PROPIONATE 50 UG/1
50 SPRAY, METERED NASAL DAILY
Qty: 1 | Refills: 0 | Status: DISCONTINUED | COMMUNITY
Start: 2019-10-29 | End: 2019-11-24

## 2019-11-24 RX ORDER — TRAMADOL HYDROCHLORIDE 50 MG/1
50 TABLET, COATED ORAL
Qty: 30 | Refills: 0 | Status: DISCONTINUED | COMMUNITY
Start: 2019-08-15 | End: 2019-11-24

## 2019-11-24 RX ORDER — ALPRAZOLAM 0.5 MG/1
0.5 TABLET ORAL
Qty: 60 | Refills: 0 | Status: DISCONTINUED | COMMUNITY
Start: 2019-08-21 | End: 2019-11-24

## 2019-11-24 RX ORDER — BUDESONIDE AND FORMOTEROL FUMARATE DIHYDRATE 80; 4.5 UG/1; UG/1
80-4.5 AEROSOL RESPIRATORY (INHALATION)
Qty: 1 | Refills: 0 | Status: DISCONTINUED | COMMUNITY
Start: 2019-10-21 | End: 2019-11-24

## 2019-11-24 RX ORDER — BUMETANIDE 1 MG/1
1 TABLET ORAL
Qty: 90 | Refills: 3 | Status: DISCONTINUED | COMMUNITY
End: 2019-11-24

## 2019-11-24 NOTE — HISTORY OF PRESENT ILLNESS
[FreeTextEntry1] : FOLLOW YOUR HEART\par \par 11/19:  Hospital Course	 \par 68 year-old female with PMH of DM, bladder prolapse, hx of hip fx, stomach ca, recent CABG, now presents with right lower extremity pain and swelling, as well as shortness of breath and RA sat 90%. Pt found to have right pleural effusion which was drained on 11/1/19 1400cc serosanginous fluid, repeat cxr on 11/2/19 showed right pleural effusion and pulm edema. pt also has cellulities of right lower extremity placed on iv abx. On presentation her Cr 1.8. Patient was admitted for acute exacerbation of chronic systolic heart failure. She had right pigtail placed on 11/4/2019, with persistent drainage of serosanginous fluid. Pt had pleurodesis on 11/15/2019. Patient had bhumika which resolved during her hospital stay. She was transfused for anemia, secondary to acute surgical blood loss. Pt left lower leg was most likely due to neuropathy pain, not cellulitis and iv abx was stopped. Bilateral duplex was done to r/o DVT. \par Patient was optimized on her heart failure regimen of lasix, coreg, isordil, entresto, and hydralazine. Patient was discharged to SNF on HD #19 in stable condition with follow up to see Dr. Escobar on 11/25/2019 @ 3:00pm. Patient is also instructed to follow up with Dr Gandara from Heart Failure on friday \par 11/22/2019. Patient will have repeat bmp as outpatient to follow electrolytes. \par \par Patient is seen at [facility] for a re-visit following a recent readmission s/p CABG/MAZE on 10/7.  Patient appears comfortable and offers no questions, concerns, or complaints at this time.  Patient states she does two hours of PT daily at [facility].

## 2019-11-24 NOTE — PHYSICAL EXAM
[] : no respiratory distress [Heart Rate And Rhythm] : heart rate was normal and rhythm regular [Auscultation Breath Sounds / Voice Sounds] : lungs were clear to auscultation bilaterally [Murmurs] : no murmurs [Heart Sounds Pericardial Friction Rub] : no pericardial rub [Heart Sounds Gallop] : no gallops [Heart Sounds] : normal S1 and S2 [Chest Visual Inspection Thoracic Asymmetry] : no chest asymmetry [Examination Of The Chest] : the chest was normal in appearance [Diminished Respiratory Excursion] : normal chest expansion [FreeTextEntry1] : +1 BLLE edema

## 2019-11-24 NOTE — ASSESSMENT
[FreeTextEntry1] : Education\par 1.  DC instructions reviewed with patient - discussed importance of medications (indication, schedule, side effects, and importance of compliance reviewed) and f/u appointments.\par 2.  Clean incision sites daily - shower indirectly, clean with soap and water, pat dry.  Avoid the use of lotions, ointments, powders, and perfumes on or around incision sites.\par 3.  Sternal precautions - avoid any heavy lifting (>5-10 lbs x 8 weeks), raising both arms above head simultaneously.\par 4.  Place TEDs on in AM prior to getting out of bed and off in PM when returning to bed.\par 5.  Follow a heart healthy diet - low salt, low fat.\par 6.  Exercise daily.\par 7.  Monitor and call [facility] NP for signs and symptoms of infection (redness, drainage, and fever).\par 8.  Monitor daily weights (call for a gain of 2-3 lbs/day or 5-6 lbs/week). \par 9.  Blood sugar control necessary for wound healing if applicable.\par 10.  Avoid straining during BM - use stool softners as needed. \par 11.  Instructed on importance of incentive spirometry use (10x/hour).\par \par Patient verbalized understanding of all education outlined above. Pt instructed to call [facility] NP for any issues as delineated above.\par \par PLAN\par 1.  Monitor daily weights\par 2.  Continue current medications as prescribed\par 3.  Incentive spirometry use\par 4.  Maintain sternal precautions\par 5.  Exercise daily\par 5.  Maintain HH diet\par 6.  Maintain TEDs\par 7.  Keep legs elevated\par 8.  F/U appointments - \par CTSx Dr. Escobar 11/25\par HF Dr. Gandara 11/22\par Cardio Dr. Gavino CASTRO\par PMD Dr. Barbosa\par 9.  [facility] NP will continue to f/u with patient status - Pt agrees to call with any questions/concerns\par 10. To recover without complications.\par

## 2019-11-27 ENCOUNTER — OUTPATIENT (OUTPATIENT)
Dept: OUTPATIENT SERVICES | Facility: HOSPITAL | Age: 69
LOS: 1 days | Discharge: HOME | End: 2019-11-27

## 2019-11-27 DIAGNOSIS — Z98.890 OTHER SPECIFIED POSTPROCEDURAL STATES: Chronic | ICD-10-CM

## 2019-11-27 DIAGNOSIS — R79.9 ABNORMAL FINDING OF BLOOD CHEMISTRY, UNSPECIFIED: ICD-10-CM

## 2019-11-28 ENCOUNTER — OUTPATIENT (OUTPATIENT)
Dept: OUTPATIENT SERVICES | Facility: HOSPITAL | Age: 69
LOS: 1 days | Discharge: HOME | End: 2019-11-28

## 2019-11-28 DIAGNOSIS — Z98.890 OTHER SPECIFIED POSTPROCEDURAL STATES: Chronic | ICD-10-CM

## 2019-11-28 DIAGNOSIS — R79.9 ABNORMAL FINDING OF BLOOD CHEMISTRY, UNSPECIFIED: ICD-10-CM

## 2019-11-29 ENCOUNTER — APPOINTMENT (OUTPATIENT)
Dept: CARDIOLOGY | Facility: CLINIC | Age: 69
End: 2019-11-29
Payer: MEDICARE

## 2019-11-29 VITALS — DIASTOLIC BLOOD PRESSURE: 60 MMHG | HEART RATE: 90 BPM | SYSTOLIC BLOOD PRESSURE: 110 MMHG

## 2019-11-29 PROCEDURE — 93010 ELECTROCARDIOGRAM REPORT: CPT

## 2019-11-29 PROCEDURE — 99215 OFFICE O/P EST HI 40 MIN: CPT

## 2019-11-29 PROCEDURE — 93000 ELECTROCARDIOGRAM COMPLETE: CPT

## 2019-11-29 PROCEDURE — 93005 ELECTROCARDIOGRAM TRACING: CPT

## 2019-11-29 NOTE — REVIEW OF SYSTEMS
[Fever] : no fever [Dyspnea on exertion] : not dyspnea during exertion [Headache] : no headache [Blurry Vision] : no blurred vision [Chest  Pressure] : no chest pressure [Leg Claudication] : no intermittent leg claudication [Nausea] : no nausea [Vomiting] : no vomiting [Change in Appetite] : no change in appetite [Dysuria] : no dysuria

## 2019-11-29 NOTE — HISTORY OF PRESENT ILLNESS
[FreeTextEntry1] : 69 y/o F with h/o DM, paroxysmal afib, CAD s/p recent MI and diagnosis of triple vessel disease with severely depressed LV systolic function and acute heart failure. Patient underwent 3v-CABG (LIMA-LAD, SVG-OM and SVG-PDA), post operative course was complicated by persistent symptoms of heart failure, Pleural effusion that required thoracentesis x2, GIB and urinary retention. She also had paroxysmal afib post Op. She was started on amiodarone. No A/C given while hospitalized due to GIB. Patient was discharged on 10/18 home with a Castellon catheter. Her LVEF at discharge was ~ 20% with a restrictive filling pattern. She was readmitted less than two weeks after discharge due pain and swelling in RLE and reaccumulation of pleural effusion. She was treated with Abx for presumed infection of her leg and got another thoracentesis followed by pleurodesis due to recurrent effusion. She was discharged to [facility]. I saw her last week and she was doing fine except for a discharge from her [facility] due to SVT after her BB were stopped at the NH. \par \par Patient is coming today in company of her daughter. She feels very good. She is participating actively in PT at the NH and feels she is getting stronger. No c/o chest pain, sob, dizziness or lightheaded, no more discharges from [facility]. She is compliant with meds and low salt diet.

## 2019-11-29 NOTE — PHYSICAL EXAM
[General Appearance - Well Developed] : well developed [General Appearance - In No Acute Distress] : no acute distress [Normal Conjunctiva] : the conjunctiva exhibited no abnormalities [Normal Oral Mucosa] : normal oral mucosa [FreeTextEntry1] : No JVD [] : no respiratory distress [Heart Sounds] : normal S1 and S2 [Arterial Pulses Normal] : the arterial pulses were normal [Edema] : no peripheral edema present [Abdomen Soft] : soft [Oriented To Time, Place, And Person] : oriented to person, place, and time [Abdomen Tenderness] : non-tender [Affect] : the affect was normal

## 2019-11-29 NOTE — ASSESSMENT
[FreeTextEntry1] : 67 y/o F with h/o CAD s/p CABG for triple vessel disease and ischemic cardiomyopathy coming for follow up. She is euvolemic on exam, no c/o chest pain or sob. Blood work shows serum K of 5.7.

## 2019-11-30 ENCOUNTER — OUTPATIENT (OUTPATIENT)
Dept: OUTPATIENT SERVICES | Facility: HOSPITAL | Age: 69
LOS: 1 days | Discharge: HOME | End: 2019-11-30

## 2019-11-30 DIAGNOSIS — Z98.890 OTHER SPECIFIED POSTPROCEDURAL STATES: Chronic | ICD-10-CM

## 2019-12-02 ENCOUNTER — APPOINTMENT (OUTPATIENT)
Dept: CARDIOTHORACIC SURGERY | Facility: CLINIC | Age: 69
End: 2019-12-02

## 2019-12-02 VITALS
RESPIRATION RATE: 10 BRPM | DIASTOLIC BLOOD PRESSURE: 47 MMHG | OXYGEN SATURATION: 100 % | HEIGHT: 60 IN | HEART RATE: 85 BPM | SYSTOLIC BLOOD PRESSURE: 76 MMHG | BODY MASS INDEX: 22.58 KG/M2 | TEMPERATURE: 97.5 F | WEIGHT: 115 LBS

## 2019-12-03 ENCOUNTER — OUTPATIENT (OUTPATIENT)
Dept: OUTPATIENT SERVICES | Facility: HOSPITAL | Age: 69
LOS: 1 days | Discharge: HOME | End: 2019-12-03

## 2019-12-03 DIAGNOSIS — Z98.890 OTHER SPECIFIED POSTPROCEDURAL STATES: Chronic | ICD-10-CM

## 2019-12-03 DIAGNOSIS — R79.9 ABNORMAL FINDING OF BLOOD CHEMISTRY, UNSPECIFIED: ICD-10-CM

## 2019-12-04 ENCOUNTER — APPOINTMENT (OUTPATIENT)
Dept: UROGYNECOLOGY | Facility: CLINIC | Age: 69
End: 2019-12-04

## 2019-12-04 DIAGNOSIS — I10 ESSENTIAL (PRIMARY) HYPERTENSION: ICD-10-CM

## 2019-12-13 ENCOUNTER — APPOINTMENT (OUTPATIENT)
Dept: CARDIOLOGY | Facility: CLINIC | Age: 69
End: 2019-12-13
Payer: MEDICARE

## 2019-12-13 ENCOUNTER — OUTPATIENT (OUTPATIENT)
Dept: OUTPATIENT SERVICES | Facility: HOSPITAL | Age: 69
LOS: 1 days | Discharge: HOME | End: 2019-12-13

## 2019-12-13 VITALS
BODY MASS INDEX: 22.26 KG/M2 | DIASTOLIC BLOOD PRESSURE: 50 MMHG | WEIGHT: 114 LBS | SYSTOLIC BLOOD PRESSURE: 90 MMHG | HEART RATE: 84 BPM

## 2019-12-13 DIAGNOSIS — Z98.890 OTHER SPECIFIED POSTPROCEDURAL STATES: Chronic | ICD-10-CM

## 2019-12-13 DIAGNOSIS — R79.9 ABNORMAL FINDING OF BLOOD CHEMISTRY, UNSPECIFIED: ICD-10-CM

## 2019-12-13 PROCEDURE — 93000 ELECTROCARDIOGRAM COMPLETE: CPT

## 2019-12-13 PROCEDURE — 93005 ELECTROCARDIOGRAM TRACING: CPT

## 2019-12-13 PROCEDURE — 99215 OFFICE O/P EST HI 40 MIN: CPT

## 2019-12-13 PROCEDURE — 93010 ELECTROCARDIOGRAM REPORT: CPT

## 2019-12-15 NOTE — HISTORY OF PRESENT ILLNESS
[FreeTextEntry1] : 67 y/o F with h/o DM, paroxysmal afib, CAD s/p recent MI and diagnosis of triple vessel disease with severely depressed LV systolic function and acute heart failure. Patient underwent 3v-CABG (LIMA-LAD, SVG-OM and SVG-PDA), post operative course was complicated by persistent symptoms of heart failure, Pleural effusion that required thoracentesis x2, GIB and urinary retention. She also had paroxysmal afib post Op. She was started on amiodarone. No A/C given while hospitalized due to GIB. Patient was discharged on 10/18 home with a Castellon catheter. Her LVEF at discharge was ~ 20% with a restrictive filling pattern. She was readmitted less than two weeks after discharge due pain and swelling in RLE and reaccumulation of pleural effusion. She was treated with Abx for presumed infection of her leg and got another thoracentesis followed by pleurodesis due to recurrent effusion. She was discharged to [facility]. \par \par Patient coming today for follow up. She is feeling well today. She is getting stronger physically but still needs assistance. No c/o SOB or fatigue with regular activities at rehab. She is having an URI with occasional cough w/o sputum production\par \par

## 2019-12-15 NOTE — PHYSICAL EXAM
[General Appearance - In No Acute Distress] : no acute distress [] : no respiratory distress [Heart Sounds] : normal S1 and S2 [Oriented To Time, Place, And Person] : oriented to person, place, and time [Normal Conjunctiva] : the conjunctiva exhibited no abnormalities [Abdomen Soft] : soft [FreeTextEntry1] : IVC dilated on POC US

## 2019-12-15 NOTE — ASSESSMENT
[FreeTextEntry1] : 69 y/o F with h/o DM, CAD with triple vessel disease s/p CABG, ICM, chronic systolic HF with LVEF ~ 30%. Patient doing well at rehab. Slightly overloaded on exam.

## 2019-12-15 NOTE — REVIEW OF SYSTEMS
[Cough] : cough [Fever] : no fever [Blurry Vision] : no blurred vision [Earache] : no earache [Chest  Pressure] : no chest pressure [Loss Of Hearing] : no hearing loss [Wheezing] : no wheezing [Dysuria] : no dysuria [Change In Color Of Skin] : change in skin color

## 2019-12-16 ENCOUNTER — APPOINTMENT (OUTPATIENT)
Dept: CARDIOTHORACIC SURGERY | Facility: CLINIC | Age: 69
End: 2019-12-16

## 2019-12-18 NOTE — PATIENT PROFILE ADULT - COMPLETE THE FOLLOWING IF THE PATIENT REFUSES THE INFLUENZA VACCINE:
ANTICOAGULATION FOLLOW-UP CLINIC VISIT    Patient Name:  Linwood Chi  Date:  2019  Contact Type:  Face to Face    SUBJECTIVE:  Patient Findings     Comments:   The patient was assessed for diet, medication, and activity level changes, missed or extra doses, bruising or bleeding, with no problem findings.          Clinical Outcomes     Negatives:   Major bleeding event, Thromboembolic event, Anticoagulation-related hospital admission, Anticoagulation-related ED visit, Anticoagulation-related fatality    Comments:   The patient was assessed for diet, medication, and activity level changes, missed or extra doses, bruising or bleeding, with no problem findings.             OBJECTIVE    INR Protime   Date Value Ref Range Status   2019 2.3 (A) 0.86 - 1.14 Final       ASSESSMENT / PLAN  No question data found.  Anticoagulation Summary  As of 2019    INR goal:   2.0-3.0   TTR:   72.4 % (1 y)   INR used for dosin.3 (2019)   Warfarin maintenance plan:   2.5 mg (2.5 mg x 1) every day   Full warfarin instructions:   2.5 mg every day   Weekly warfarin total:   17.5 mg   No change documented:   Layla Garcia RN   Plan last modified:   Layla Garcia RN (10/9/2019)   Next INR check:   2020   Priority:   INR   Target end date:   Indefinite    Indications    Long-term (current) use of anticoagulants [Z79.01] [Z79.01]  Atrial fibrillation (HCC) [I48.91] [I48.91]             Anticoagulation Episode Summary     INR check location:       Preferred lab:       Send INR reminders to:   SANDOR LILLY    Comments:         Anticoagulation Care Providers     Provider Role Specialty Phone number    Sony Canchola MD [facility] [phone]            See the Encounter Report to view Anticoagulation Flowsheet and Dosing Calendar (Go to Encounters tab in chart review, and find the Anticoagulation Therapy Visit)        Layla Garcia RN                  Risks/benefits discussed with patient/surrogate

## 2019-12-20 ENCOUNTER — OUTPATIENT (OUTPATIENT)
Dept: OUTPATIENT SERVICES | Facility: HOSPITAL | Age: 69
LOS: 1 days | Discharge: HOME | End: 2019-12-20

## 2019-12-20 DIAGNOSIS — R79.9 ABNORMAL FINDING OF BLOOD CHEMISTRY, UNSPECIFIED: ICD-10-CM

## 2019-12-20 DIAGNOSIS — Z98.890 OTHER SPECIFIED POSTPROCEDURAL STATES: Chronic | ICD-10-CM

## 2019-12-23 ENCOUNTER — OUTPATIENT (OUTPATIENT)
Dept: OUTPATIENT SERVICES | Facility: HOSPITAL | Age: 69
LOS: 1 days | Discharge: HOME | End: 2019-12-23

## 2019-12-23 DIAGNOSIS — Z98.890 OTHER SPECIFIED POSTPROCEDURAL STATES: Chronic | ICD-10-CM

## 2019-12-23 DIAGNOSIS — R79.9 ABNORMAL FINDING OF BLOOD CHEMISTRY, UNSPECIFIED: ICD-10-CM

## 2019-12-26 ENCOUNTER — OUTPATIENT (OUTPATIENT)
Dept: OUTPATIENT SERVICES | Facility: HOSPITAL | Age: 69
LOS: 1 days | Discharge: HOME | End: 2019-12-26

## 2019-12-26 DIAGNOSIS — R79.9 ABNORMAL FINDING OF BLOOD CHEMISTRY, UNSPECIFIED: ICD-10-CM

## 2019-12-26 DIAGNOSIS — Z98.890 OTHER SPECIFIED POSTPROCEDURAL STATES: Chronic | ICD-10-CM

## 2019-12-27 ENCOUNTER — OUTPATIENT (OUTPATIENT)
Dept: OUTPATIENT SERVICES | Facility: HOSPITAL | Age: 69
LOS: 1 days | Discharge: HOME | End: 2019-12-27

## 2019-12-27 DIAGNOSIS — R79.9 ABNORMAL FINDING OF BLOOD CHEMISTRY, UNSPECIFIED: ICD-10-CM

## 2019-12-27 DIAGNOSIS — Z98.890 OTHER SPECIFIED POSTPROCEDURAL STATES: Chronic | ICD-10-CM

## 2019-12-30 ENCOUNTER — APPOINTMENT (OUTPATIENT)
Dept: CARDIOTHORACIC SURGERY | Facility: CLINIC | Age: 69
End: 2019-12-30
Payer: MEDICARE

## 2019-12-30 VITALS
TEMPERATURE: 97.8 F | DIASTOLIC BLOOD PRESSURE: 72 MMHG | HEIGHT: 60 IN | SYSTOLIC BLOOD PRESSURE: 130 MMHG | RESPIRATION RATE: 12 BRPM | HEART RATE: 64 BPM | WEIGHT: 119 LBS | BODY MASS INDEX: 23.36 KG/M2 | OXYGEN SATURATION: 98 %

## 2019-12-30 PROCEDURE — 99024 POSTOP FOLLOW-UP VISIT: CPT

## 2019-12-31 ENCOUNTER — OUTPATIENT (OUTPATIENT)
Dept: OUTPATIENT SERVICES | Facility: HOSPITAL | Age: 69
LOS: 1 days | Discharge: HOME | End: 2019-12-31

## 2019-12-31 DIAGNOSIS — R79.9 ABNORMAL FINDING OF BLOOD CHEMISTRY, UNSPECIFIED: ICD-10-CM

## 2019-12-31 DIAGNOSIS — Z98.890 OTHER SPECIFIED POSTPROCEDURAL STATES: Chronic | ICD-10-CM

## 2020-01-01 ENCOUNTER — APPOINTMENT (OUTPATIENT)
Dept: CARDIOLOGY | Facility: CLINIC | Age: 70
End: 2020-01-01
Payer: MEDICARE

## 2020-01-01 ENCOUNTER — OUTPATIENT (OUTPATIENT)
Dept: OUTPATIENT SERVICES | Facility: HOSPITAL | Age: 70
LOS: 1 days | Discharge: HOME | End: 2020-01-01

## 2020-01-01 ENCOUNTER — INPATIENT (INPATIENT)
Facility: HOSPITAL | Age: 70
LOS: 2 days | Discharge: HOME | End: 2020-07-13
Attending: SURGERY | Admitting: SURGERY
Payer: MEDICARE

## 2020-01-01 ENCOUNTER — OUTPATIENT (OUTPATIENT)
Dept: OUTPATIENT SERVICES | Facility: HOSPITAL | Age: 70
LOS: 1 days | Discharge: HOME | End: 2020-01-01
Payer: MEDICARE

## 2020-01-01 ENCOUNTER — TRANSCRIPTION ENCOUNTER (OUTPATIENT)
Age: 70
End: 2020-01-01

## 2020-01-01 ENCOUNTER — LABORATORY RESULT (OUTPATIENT)
Age: 70
End: 2020-01-01

## 2020-01-01 ENCOUNTER — APPOINTMENT (OUTPATIENT)
Dept: UROGYNECOLOGY | Facility: CLINIC | Age: 70
End: 2020-01-01
Payer: MEDICARE

## 2020-01-01 ENCOUNTER — APPOINTMENT (OUTPATIENT)
Dept: UROGYNECOLOGY | Facility: CLINIC | Age: 70
End: 2020-01-01

## 2020-01-01 ENCOUNTER — APPOINTMENT (OUTPATIENT)
Dept: UROLOGY | Facility: CLINIC | Age: 70
End: 2020-01-01

## 2020-01-01 ENCOUNTER — APPOINTMENT (OUTPATIENT)
Dept: UROLOGY | Facility: CLINIC | Age: 70
End: 2020-01-01
Payer: MEDICARE

## 2020-01-01 ENCOUNTER — NON-APPOINTMENT (OUTPATIENT)
Age: 70
End: 2020-01-01

## 2020-01-01 ENCOUNTER — RX RENEWAL (OUTPATIENT)
Age: 70
End: 2020-01-01

## 2020-01-01 VITALS
WEIGHT: 123 LBS | HEIGHT: 63 IN | SYSTOLIC BLOOD PRESSURE: 114 MMHG | OXYGEN SATURATION: 95 % | TEMPERATURE: 97.7 F | DIASTOLIC BLOOD PRESSURE: 66 MMHG | BODY MASS INDEX: 21.79 KG/M2 | HEART RATE: 71 BPM

## 2020-01-01 VITALS
HEIGHT: 63 IN | OXYGEN SATURATION: 97 % | WEIGHT: 115 LBS | BODY MASS INDEX: 20.38 KG/M2 | HEART RATE: 77 BPM | DIASTOLIC BLOOD PRESSURE: 70 MMHG | TEMPERATURE: 98.4 F | SYSTOLIC BLOOD PRESSURE: 150 MMHG

## 2020-01-01 VITALS
HEIGHT: 62 IN | DIASTOLIC BLOOD PRESSURE: 88 MMHG | RESPIRATION RATE: 18 BRPM | TEMPERATURE: 98 F | SYSTOLIC BLOOD PRESSURE: 134 MMHG | HEART RATE: 65 BPM | OXYGEN SATURATION: 99 % | WEIGHT: 117.95 LBS

## 2020-01-01 VITALS — HEIGHT: 63 IN | DIASTOLIC BLOOD PRESSURE: 70 MMHG | SYSTOLIC BLOOD PRESSURE: 136 MMHG

## 2020-01-01 VITALS — SYSTOLIC BLOOD PRESSURE: 132 MMHG | DIASTOLIC BLOOD PRESSURE: 76 MMHG

## 2020-01-01 VITALS
TEMPERATURE: 97 F | HEART RATE: 75 BPM | RESPIRATION RATE: 16 BRPM | DIASTOLIC BLOOD PRESSURE: 77 MMHG | SYSTOLIC BLOOD PRESSURE: 178 MMHG

## 2020-01-01 VITALS
SYSTOLIC BLOOD PRESSURE: 148 MMHG | HEART RATE: 64 BPM | OXYGEN SATURATION: 95 % | HEIGHT: 62.5 IN | RESPIRATION RATE: 12 BRPM | DIASTOLIC BLOOD PRESSURE: 67 MMHG | TEMPERATURE: 97 F | WEIGHT: 121.03 LBS

## 2020-01-01 VITALS
WEIGHT: 112 LBS | HEIGHT: 63 IN | DIASTOLIC BLOOD PRESSURE: 60 MMHG | HEART RATE: 73 BPM | TEMPERATURE: 98.5 F | OXYGEN SATURATION: 93 % | BODY MASS INDEX: 19.84 KG/M2 | SYSTOLIC BLOOD PRESSURE: 100 MMHG

## 2020-01-01 VITALS — BODY MASS INDEX: 20.37 KG/M2 | WEIGHT: 115 LBS | DIASTOLIC BLOOD PRESSURE: 60 MMHG | SYSTOLIC BLOOD PRESSURE: 102 MMHG

## 2020-01-01 VITALS — RESPIRATION RATE: 20 BRPM | HEART RATE: 55 BPM | OXYGEN SATURATION: 95 %

## 2020-01-01 VITALS
OXYGEN SATURATION: 96 % | DIASTOLIC BLOOD PRESSURE: 73 MMHG | RESPIRATION RATE: 18 BRPM | SYSTOLIC BLOOD PRESSURE: 174 MMHG | HEART RATE: 70 BPM | TEMPERATURE: 97 F

## 2020-01-01 VITALS
DIASTOLIC BLOOD PRESSURE: 60 MMHG | SYSTOLIC BLOOD PRESSURE: 119 MMHG | TEMPERATURE: 98 F | RESPIRATION RATE: 18 BRPM | HEART RATE: 77 BPM

## 2020-01-01 VITALS
HEART RATE: 66 BPM | OXYGEN SATURATION: 94 % | SYSTOLIC BLOOD PRESSURE: 124 MMHG | DIASTOLIC BLOOD PRESSURE: 70 MMHG | TEMPERATURE: 98.6 F | WEIGHT: 120 LBS | HEIGHT: 63 IN | BODY MASS INDEX: 21.26 KG/M2

## 2020-01-01 VITALS — SYSTOLIC BLOOD PRESSURE: 116 MMHG | WEIGHT: 130 LBS | DIASTOLIC BLOOD PRESSURE: 64 MMHG | BODY MASS INDEX: 25.39 KG/M2

## 2020-01-01 VITALS
TEMPERATURE: 98 F | HEART RATE: 80 BPM | RESPIRATION RATE: 18 BRPM | DIASTOLIC BLOOD PRESSURE: 65 MMHG | SYSTOLIC BLOOD PRESSURE: 142 MMHG

## 2020-01-01 VITALS — HEIGHT: 63 IN | SYSTOLIC BLOOD PRESSURE: 128 MMHG | DIASTOLIC BLOOD PRESSURE: 72 MMHG

## 2020-01-01 VITALS — TEMPERATURE: 97.4 F

## 2020-01-01 DIAGNOSIS — I50.9 HEART FAILURE, UNSPECIFIED: ICD-10-CM

## 2020-01-01 DIAGNOSIS — I50.23 ACUTE ON CHRONIC SYSTOLIC (CONGESTIVE) HEART FAILURE: ICD-10-CM

## 2020-01-01 DIAGNOSIS — B35.1 TINEA UNGUIUM: ICD-10-CM

## 2020-01-01 DIAGNOSIS — Z98.890 OTHER SPECIFIED POSTPROCEDURAL STATES: Chronic | ICD-10-CM

## 2020-01-01 DIAGNOSIS — I07.1 RHEUMATIC TRICUSPID INSUFFICIENCY: ICD-10-CM

## 2020-01-01 DIAGNOSIS — Z95.1 PRESENCE OF AORTOCORONARY BYPASS GRAFT: Chronic | ICD-10-CM

## 2020-01-01 DIAGNOSIS — N18.6 END STAGE RENAL DISEASE: ICD-10-CM

## 2020-01-01 DIAGNOSIS — Z79.4 LONG TERM (CURRENT) USE OF INSULIN: ICD-10-CM

## 2020-01-01 DIAGNOSIS — I34.0 NONRHEUMATIC MITRAL (VALVE) INSUFFICIENCY: ICD-10-CM

## 2020-01-01 DIAGNOSIS — E55.9 VITAMIN D DEFICIENCY, UNSPECIFIED: ICD-10-CM

## 2020-01-01 DIAGNOSIS — N17.0 ACUTE KIDNEY FAILURE WITH TUBULAR NECROSIS: ICD-10-CM

## 2020-01-01 DIAGNOSIS — N17.9 ACUTE KIDNEY FAILURE, UNSPECIFIED: ICD-10-CM

## 2020-01-01 DIAGNOSIS — L89.151 PRESSURE ULCER OF SACRAL REGION, STAGE 1: ICD-10-CM

## 2020-01-01 DIAGNOSIS — N39.0 URINARY TRACT INFECTION, SITE NOT SPECIFIED: ICD-10-CM
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
## 2020-01-01 DIAGNOSIS — I48.92 UNSPECIFIED ATRIAL FLUTTER: ICD-10-CM

## 2020-01-01 DIAGNOSIS — I27.20 PULMONARY HYPERTENSION, UNSPECIFIED: ICD-10-CM

## 2020-01-01 DIAGNOSIS — Z79.82 LONG TERM (CURRENT) USE OF ASPIRIN: ICD-10-CM

## 2020-01-01 DIAGNOSIS — I25.10 ATHEROSCLEROTIC HEART DISEASE OF NATIVE CORONARY ARTERY WITHOUT ANGINA PECTORIS: ICD-10-CM

## 2020-01-01 DIAGNOSIS — L60.0 INGROWING NAIL: ICD-10-CM

## 2020-01-01 DIAGNOSIS — I25.5 ISCHEMIC CARDIOMYOPATHY: ICD-10-CM

## 2020-01-01 DIAGNOSIS — E11.21 TYPE 2 DIABETES MELLITUS WITH DIABETIC NEPHROPATHY: ICD-10-CM

## 2020-01-01 DIAGNOSIS — D62 ACUTE POSTHEMORRHAGIC ANEMIA: ICD-10-CM

## 2020-01-01 DIAGNOSIS — R57.9 SHOCK, UNSPECIFIED: ICD-10-CM

## 2020-01-01 DIAGNOSIS — I35.0 NONRHEUMATIC AORTIC (VALVE) STENOSIS: ICD-10-CM

## 2020-01-01 DIAGNOSIS — E11.22 TYPE 2 DIABETES MELLITUS WITH DIABETIC CHRONIC KIDNEY DISEASE: ICD-10-CM

## 2020-01-01 DIAGNOSIS — Z11.59 ENCOUNTER FOR SCREENING FOR OTHER VIRAL DISEASES: ICD-10-CM

## 2020-01-01 DIAGNOSIS — R33.9 RETENTION OF URINE, UNSPECIFIED: ICD-10-CM

## 2020-01-01 DIAGNOSIS — N32.0 BLADDER-NECK OBSTRUCTION: ICD-10-CM

## 2020-01-01 DIAGNOSIS — R06.02 SHORTNESS OF BREATH: ICD-10-CM

## 2020-01-01 DIAGNOSIS — I48.0 PAROXYSMAL ATRIAL FIBRILLATION: ICD-10-CM

## 2020-01-01 DIAGNOSIS — R57.0 CARDIOGENIC SHOCK: ICD-10-CM

## 2020-01-01 DIAGNOSIS — Z85.028 PERSONAL HISTORY OF OTHER MALIGNANT NEOPLASM OF STOMACH: ICD-10-CM

## 2020-01-01 DIAGNOSIS — N99.840 POSTPROCEDURAL HEMATOMA OF A GENITOURINARY SYSTEM ORGAN OR STRUCTURE FOLLOWING A GENITOURINARY SYSTEM PROCEDURE: ICD-10-CM
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
## 2020-01-01 DIAGNOSIS — D61.818 OTHER PANCYTOPENIA: ICD-10-CM

## 2020-01-01 DIAGNOSIS — D64.9 ANEMIA, UNSPECIFIED: ICD-10-CM

## 2020-01-01 DIAGNOSIS — F17.210 NICOTINE DEPENDENCE, CIGARETTES, UNCOMPLICATED: ICD-10-CM

## 2020-01-01 DIAGNOSIS — N81.10 CYSTOCELE, UNSPECIFIED: ICD-10-CM

## 2020-01-01 DIAGNOSIS — J90 PLEURAL EFFUSION, NOT ELSEWHERE CLASSIFIED: ICD-10-CM

## 2020-01-01 DIAGNOSIS — S22.42XA MULTIPLE FRACTURES OF RIBS, LEFT SIDE, INITIAL ENCOUNTER FOR CLOSED FRACTURE: ICD-10-CM

## 2020-01-01 DIAGNOSIS — S37.011D: ICD-10-CM

## 2020-01-01 DIAGNOSIS — D69.6 THROMBOCYTOPENIA, UNSPECIFIED: ICD-10-CM

## 2020-01-01 DIAGNOSIS — Z01.818 ENCOUNTER FOR OTHER PREPROCEDURAL EXAMINATION: ICD-10-CM

## 2020-01-01 DIAGNOSIS — Z96.0 PRESENCE OF UROGENITAL IMPLANTS: ICD-10-CM

## 2020-01-01 DIAGNOSIS — Z95.1 PRESENCE OF AORTOCORONARY BYPASS GRAFT: ICD-10-CM

## 2020-01-01 DIAGNOSIS — E87.2 ACIDOSIS: ICD-10-CM

## 2020-01-01 DIAGNOSIS — I95.9 HYPOTENSION, UNSPECIFIED: ICD-10-CM

## 2020-01-01 DIAGNOSIS — I48.91 UNSPECIFIED ATRIAL FIBRILLATION: ICD-10-CM

## 2020-01-01 DIAGNOSIS — B95.61 METHICILLIN SUSCEPTIBLE STAPHYLOCOCCUS AUREUS INFECTION AS THE CAUSE OF DISEASES CLASSIFIED ELSEWHERE: ICD-10-CM

## 2020-01-01 DIAGNOSIS — R60.1 GENERALIZED EDEMA: ICD-10-CM

## 2020-01-01 DIAGNOSIS — Z02.9 ENCOUNTER FOR ADMINISTRATIVE EXAMINATIONS, UNSPECIFIED: ICD-10-CM

## 2020-01-01 DIAGNOSIS — Q21.1 ATRIAL SEPTAL DEFECT: ICD-10-CM

## 2020-01-01 DIAGNOSIS — N18.4 CHRONIC KIDNEY DISEASE, STAGE 4 (SEVERE): ICD-10-CM

## 2020-01-01 LAB
% ALBUMIN: 59.3 % — SIGNIFICANT CHANGE UP
% ALPHA 1: 7.3 % — SIGNIFICANT CHANGE UP
% ALPHA 2: 11.5 % — SIGNIFICANT CHANGE UP
% BETA: 11 % — SIGNIFICANT CHANGE UP
% GAMMA: 10.9 % — SIGNIFICANT CHANGE UP
-  AMPICILLIN/SULBACTAM: SIGNIFICANT CHANGE UP
-  CEFAZOLIN: SIGNIFICANT CHANGE UP
-  GENTAMICIN: SIGNIFICANT CHANGE UP
-  OXACILLIN: SIGNIFICANT CHANGE UP
-  PENICILLIN: SIGNIFICANT CHANGE UP
-  RIFAMPIN: SIGNIFICANT CHANGE UP
-  TETRACYCLINE: SIGNIFICANT CHANGE UP
-  TRIMETHOPRIM/SULFAMETHOXAZOLE: SIGNIFICANT CHANGE UP
-  VANCOMYCIN: SIGNIFICANT CHANGE UP
ALBUMIN SERPL ELPH-MCNC: 2.8 G/DL — LOW (ref 3.5–5.2)
ALBUMIN SERPL ELPH-MCNC: 2.9 G/DL — LOW (ref 3.5–5.2)
ALBUMIN SERPL ELPH-MCNC: 3 G/DL — LOW (ref 3.5–5.2)
ALBUMIN SERPL ELPH-MCNC: 3 G/DL — LOW (ref 3.6–5.5)
ALBUMIN SERPL ELPH-MCNC: 3.1 G/DL — LOW (ref 3.5–5.2)
ALBUMIN SERPL ELPH-MCNC: 3.1 G/DL — LOW (ref 3.5–5.2)
ALBUMIN SERPL ELPH-MCNC: 3.2 G/DL — LOW (ref 3.5–5.2)
ALBUMIN SERPL ELPH-MCNC: 3.5 G/DL — SIGNIFICANT CHANGE UP (ref 3.5–5.2)
ALBUMIN SERPL ELPH-MCNC: 4.7 G/DL — SIGNIFICANT CHANGE UP (ref 3.5–5.2)
ALBUMIN/GLOB SERPL ELPH: 1.5 RATIO — SIGNIFICANT CHANGE UP
ALP SERPL-CCNC: 107 U/L — SIGNIFICANT CHANGE UP (ref 30–115)
ALP SERPL-CCNC: 47 U/L — SIGNIFICANT CHANGE UP (ref 30–115)
ALP SERPL-CCNC: 47 U/L — SIGNIFICANT CHANGE UP (ref 30–115)
ALP SERPL-CCNC: 48 U/L — SIGNIFICANT CHANGE UP (ref 30–115)
ALP SERPL-CCNC: 50 U/L — SIGNIFICANT CHANGE UP (ref 30–115)
ALP SERPL-CCNC: 51 U/L — SIGNIFICANT CHANGE UP (ref 30–115)
ALP SERPL-CCNC: 52 U/L — SIGNIFICANT CHANGE UP (ref 30–115)
ALP SERPL-CCNC: 52 U/L — SIGNIFICANT CHANGE UP (ref 30–115)
ALP SERPL-CCNC: 53 U/L — SIGNIFICANT CHANGE UP (ref 30–115)
ALP SERPL-CCNC: 54 U/L — SIGNIFICANT CHANGE UP (ref 30–115)
ALP SERPL-CCNC: 54 U/L — SIGNIFICANT CHANGE UP (ref 30–115)
ALP SERPL-CCNC: 59 U/L — SIGNIFICANT CHANGE UP (ref 30–115)
ALPHA1 GLOB SERPL ELPH-MCNC: 0.4 G/DL — SIGNIFICANT CHANGE UP (ref 0.1–0.4)
ALPHA2 GLOB SERPL ELPH-MCNC: 0.6 G/DL — SIGNIFICANT CHANGE UP (ref 0.5–1)
ALT FLD-CCNC: 5 U/L — SIGNIFICANT CHANGE UP (ref 0–41)
ALT FLD-CCNC: 6 U/L — SIGNIFICANT CHANGE UP (ref 0–41)
ALT FLD-CCNC: 8 U/L — SIGNIFICANT CHANGE UP (ref 0–41)
ALT FLD-CCNC: <5 U/L — SIGNIFICANT CHANGE UP (ref 0–41)
ANION GAP SERPL CALC-SCNC: 10 MMOL/L — SIGNIFICANT CHANGE UP (ref 7–14)
ANION GAP SERPL CALC-SCNC: 10 MMOL/L — SIGNIFICANT CHANGE UP (ref 7–14)
ANION GAP SERPL CALC-SCNC: 12 MMOL/L — SIGNIFICANT CHANGE UP (ref 7–14)
ANION GAP SERPL CALC-SCNC: 13 MMOL/L — SIGNIFICANT CHANGE UP (ref 7–14)
ANION GAP SERPL CALC-SCNC: 14 MMOL/L — SIGNIFICANT CHANGE UP (ref 7–14)
ANION GAP SERPL CALC-SCNC: 14 MMOL/L — SIGNIFICANT CHANGE UP (ref 7–14)
ANION GAP SERPL CALC-SCNC: 15 MMOL/L — HIGH (ref 7–14)
ANION GAP SERPL CALC-SCNC: 16 MMOL/L — HIGH (ref 7–14)
ANION GAP SERPL CALC-SCNC: 17 MMOL/L — HIGH (ref 7–14)
ANION GAP SERPL CALC-SCNC: 19 MMOL/L — HIGH (ref 7–14)
ANION GAP SERPL CALC-SCNC: 20 MMOL/L — HIGH (ref 7–14)
ANION GAP SERPL CALC-SCNC: 21 MMOL/L — HIGH (ref 7–14)
APTT BLD: 100.4 SEC — CRITICAL HIGH (ref 27–39.2)
APTT BLD: 133.9 SEC — CRITICAL HIGH (ref 27–39.2)
APTT BLD: 180.5 SEC — CRITICAL HIGH (ref 27–39.2)
APTT BLD: 31.8 SEC — SIGNIFICANT CHANGE UP (ref 27–39.2)
APTT BLD: 31.9 SEC — SIGNIFICANT CHANGE UP (ref 27–39.2)
APTT BLD: 32.9 SEC — SIGNIFICANT CHANGE UP (ref 27–39.2)
APTT BLD: 33 SEC — SIGNIFICANT CHANGE UP (ref 27–39.2)
APTT BLD: 33.1 SEC — SIGNIFICANT CHANGE UP (ref 27–39.2)
APTT BLD: 33.8 SEC — SIGNIFICANT CHANGE UP (ref 27–39.2)
APTT BLD: 34.1 SEC — SIGNIFICANT CHANGE UP (ref 27–39.2)
APTT BLD: 34.4 SEC — SIGNIFICANT CHANGE UP (ref 27–39.2)
APTT BLD: 34.8 SEC — SIGNIFICANT CHANGE UP (ref 27–39.2)
APTT BLD: 34.9 SEC — SIGNIFICANT CHANGE UP (ref 27–39.2)
APTT BLD: 37.1 SEC — SIGNIFICANT CHANGE UP (ref 27–39.2)
APTT BLD: 41.3 SEC — HIGH (ref 27–39.2)
APTT BLD: 42 SEC — HIGH (ref 27–39.2)
APTT BLD: 53.6 SEC — HIGH (ref 27–39.2)
APTT BLD: 59.2 SEC — HIGH (ref 27–39.2)
APTT BLD: 59.3 SEC — HIGH (ref 27–39.2)
APTT BLD: 74.9 SEC — CRITICAL HIGH (ref 27–39.2)
APTT BLD: 77 SEC — CRITICAL HIGH (ref 27–39.2)
APTT BLD: 78 SEC — CRITICAL HIGH (ref 27–39.2)
APTT BLD: 84.1 SEC — CRITICAL HIGH (ref 27–39.2)
APTT BLD: 98.8 SEC — CRITICAL HIGH (ref 27–39.2)
APTT BLD: >200 SEC — CRITICAL HIGH (ref 27–39.2)
APTT BLD: >200 SEC — CRITICAL HIGH (ref 27–39.2)
AST SERPL-CCNC: 10 U/L — SIGNIFICANT CHANGE UP (ref 0–41)
AST SERPL-CCNC: 18 U/L — SIGNIFICANT CHANGE UP (ref 0–41)
AST SERPL-CCNC: 7 U/L — SIGNIFICANT CHANGE UP (ref 0–41)
AST SERPL-CCNC: 8 U/L — SIGNIFICANT CHANGE UP (ref 0–41)
AST SERPL-CCNC: 9 U/L — SIGNIFICANT CHANGE UP (ref 0–41)
B-GLOBULIN SERPL ELPH-MCNC: 0.6 G/DL — SIGNIFICANT CHANGE UP (ref 0.5–1)
BASE EXCESS BLDA CALC-SCNC: -16.1 MMOL/L — LOW (ref -2–2)
BASE EXCESS BLDV CALC-SCNC: -10.3 MMOL/L — LOW (ref -2–2)
BASE EXCESS BLDV CALC-SCNC: -15.2 MMOL/L — LOW (ref -2–2)
BASE EXCESS BLDV CALC-SCNC: -15.9 MMOL/L — LOW (ref -2–2)
BASE EXCESS BLDV CALC-SCNC: -18.2 MMOL/L — LOW (ref -2–2)
BASE EXCESS BLDV CALC-SCNC: -3.5 MMOL/L — LOW (ref -2–2)
BASE EXCESS BLDV CALC-SCNC: 0.1 MMOL/L — SIGNIFICANT CHANGE UP (ref -2–2)
BASOPHILS # BLD AUTO: 0 K/UL — SIGNIFICANT CHANGE UP (ref 0–0.2)
BASOPHILS # BLD AUTO: 0 K/UL — SIGNIFICANT CHANGE UP (ref 0–0.2)
BASOPHILS # BLD AUTO: 0.01 K/UL — SIGNIFICANT CHANGE UP (ref 0–0.2)
BASOPHILS # BLD AUTO: 0.02 K/UL — SIGNIFICANT CHANGE UP (ref 0–0.2)
BASOPHILS # BLD AUTO: 0.03 K/UL — SIGNIFICANT CHANGE UP (ref 0–0.2)
BASOPHILS NFR BLD AUTO: 0 % — SIGNIFICANT CHANGE UP (ref 0–1)
BASOPHILS NFR BLD AUTO: 0 % — SIGNIFICANT CHANGE UP (ref 0–1)
BASOPHILS NFR BLD AUTO: 0.2 % — SIGNIFICANT CHANGE UP (ref 0–1)
BASOPHILS NFR BLD AUTO: 0.3 % — SIGNIFICANT CHANGE UP (ref 0–1)
BASOPHILS NFR BLD AUTO: 0.3 % — SIGNIFICANT CHANGE UP (ref 0–1)
BASOPHILS NFR BLD AUTO: 0.4 % — SIGNIFICANT CHANGE UP (ref 0–1)
BASOPHILS NFR BLD AUTO: 0.7 % — SIGNIFICANT CHANGE UP (ref 0–1)
BILIRUB SERPL-MCNC: 0.2 MG/DL — SIGNIFICANT CHANGE UP (ref 0.2–1.2)
BILIRUB SERPL-MCNC: 0.3 MG/DL — SIGNIFICANT CHANGE UP (ref 0.2–1.2)
BILIRUB SERPL-MCNC: 0.4 MG/DL — SIGNIFICANT CHANGE UP (ref 0.2–1.2)
BILIRUB SERPL-MCNC: 0.4 MG/DL — SIGNIFICANT CHANGE UP (ref 0.2–1.2)
BILIRUB SERPL-MCNC: 0.5 MG/DL — SIGNIFICANT CHANGE UP (ref 0.2–1.2)
BILIRUB SERPL-MCNC: <0.2 MG/DL — SIGNIFICANT CHANGE UP (ref 0.2–1.2)
BLD GP AB SCN SERPL QL: SIGNIFICANT CHANGE UP
BUN SERPL-MCNC: 100 MG/DL — CRITICAL HIGH (ref 10–20)
BUN SERPL-MCNC: 101 MG/DL — CRITICAL HIGH (ref 10–20)
BUN SERPL-MCNC: 104 MG/DL — CRITICAL HIGH (ref 10–20)
BUN SERPL-MCNC: 105 MG/DL — CRITICAL HIGH (ref 10–20)
BUN SERPL-MCNC: 108 MG/DL — CRITICAL HIGH (ref 10–20)
BUN SERPL-MCNC: 36 MG/DL — HIGH (ref 10–20)
BUN SERPL-MCNC: 38 MG/DL — HIGH (ref 10–20)
BUN SERPL-MCNC: 40 MG/DL — HIGH (ref 10–20)
BUN SERPL-MCNC: 50 MG/DL — HIGH (ref 10–20)
BUN SERPL-MCNC: 53 MG/DL — HIGH (ref 10–20)
BUN SERPL-MCNC: 58 MG/DL — HIGH (ref 10–20)
BUN SERPL-MCNC: 60 MG/DL — HIGH (ref 10–20)
BUN SERPL-MCNC: 62 MG/DL — CRITICAL HIGH (ref 10–20)
BUN SERPL-MCNC: 63 MG/DL — CRITICAL HIGH (ref 10–20)
BUN SERPL-MCNC: 63 MG/DL — CRITICAL HIGH (ref 10–20)
BUN SERPL-MCNC: 68 MG/DL — CRITICAL HIGH (ref 10–20)
BUN SERPL-MCNC: 72 MG/DL — CRITICAL HIGH (ref 10–20)
BUN SERPL-MCNC: 84 MG/DL — CRITICAL HIGH (ref 10–20)
BUN SERPL-MCNC: 88 MG/DL — CRITICAL HIGH (ref 10–20)
BUN SERPL-MCNC: 97 MG/DL — CRITICAL HIGH (ref 10–20)
C DIFF BY PCR RESULT: NEGATIVE — SIGNIFICANT CHANGE UP
C DIFF TOX GENS STL QL NAA+PROBE: SIGNIFICANT CHANGE UP
CA-I SERPL-SCNC: 1.18 MMOL/L — SIGNIFICANT CHANGE UP (ref 1.12–1.3)
CA-I SERPL-SCNC: 1.25 MMOL/L — SIGNIFICANT CHANGE UP (ref 1.12–1.3)
CA-I SERPL-SCNC: 1.27 MMOL/L — SIGNIFICANT CHANGE UP (ref 1.12–1.3)
CA-I SERPL-SCNC: 1.28 MMOL/L — SIGNIFICANT CHANGE UP (ref 1.12–1.3)
CA-I SERPL-SCNC: 1.35 MMOL/L — HIGH (ref 1.12–1.3)
CALCIUM SERPL-MCNC: 7.8 MG/DL — LOW (ref 8.5–10.1)
CALCIUM SERPL-MCNC: 7.9 MG/DL — LOW (ref 8.5–10.1)
CALCIUM SERPL-MCNC: 7.9 MG/DL — LOW (ref 8.5–10.1)
CALCIUM SERPL-MCNC: 8 MG/DL — LOW (ref 8.5–10.1)
CALCIUM SERPL-MCNC: 8.1 MG/DL — LOW (ref 8.5–10.1)
CALCIUM SERPL-MCNC: 8.3 MG/DL — LOW (ref 8.5–10.1)
CALCIUM SERPL-MCNC: 8.4 MG/DL — LOW (ref 8.5–10.1)
CALCIUM SERPL-MCNC: 8.5 MG/DL — SIGNIFICANT CHANGE UP (ref 8.5–10.1)
CALCIUM SERPL-MCNC: 8.6 MG/DL — SIGNIFICANT CHANGE UP (ref 8.5–10.1)
CALCIUM SERPL-MCNC: 8.6 MG/DL — SIGNIFICANT CHANGE UP (ref 8.5–10.1)
CALCIUM SERPL-MCNC: 8.7 MG/DL — SIGNIFICANT CHANGE UP (ref 8.5–10.1)
CALCIUM SERPL-MCNC: 8.9 MG/DL — SIGNIFICANT CHANGE UP (ref 8.5–10.1)
CALCIUM SERPL-MCNC: 9.2 MG/DL — SIGNIFICANT CHANGE UP (ref 8.5–10.1)
CALCIUM SERPL-MCNC: 9.2 MG/DL — SIGNIFICANT CHANGE UP (ref 8.5–10.1)
CALCIUM SERPL-MCNC: 9.3 MG/DL — SIGNIFICANT CHANGE UP (ref 8.5–10.1)
CALCIUM SERPL-MCNC: 9.6 MG/DL — SIGNIFICANT CHANGE UP (ref 8.5–10.1)
CALCIUM SERPL-MCNC: 9.7 MG/DL — SIGNIFICANT CHANGE UP (ref 8.5–10.1)
CALCIUM SERPL-MCNC: 9.9 MG/DL — SIGNIFICANT CHANGE UP (ref 8.5–10.1)
CHLORIDE SERPL-SCNC: 100 MMOL/L — SIGNIFICANT CHANGE UP (ref 98–110)
CHLORIDE SERPL-SCNC: 101 MMOL/L — SIGNIFICANT CHANGE UP (ref 98–110)
CHLORIDE SERPL-SCNC: 101 MMOL/L — SIGNIFICANT CHANGE UP (ref 98–110)
CHLORIDE SERPL-SCNC: 102 MMOL/L — SIGNIFICANT CHANGE UP (ref 98–110)
CHLORIDE SERPL-SCNC: 103 MMOL/L — SIGNIFICANT CHANGE UP (ref 98–110)
CHLORIDE SERPL-SCNC: 103 MMOL/L — SIGNIFICANT CHANGE UP (ref 98–110)
CHLORIDE SERPL-SCNC: 104 MMOL/L — SIGNIFICANT CHANGE UP (ref 98–110)
CHLORIDE SERPL-SCNC: 105 MMOL/L — SIGNIFICANT CHANGE UP (ref 98–110)
CHLORIDE SERPL-SCNC: 106 MMOL/L — SIGNIFICANT CHANGE UP (ref 98–110)
CHLORIDE SERPL-SCNC: 109 MMOL/L — SIGNIFICANT CHANGE UP (ref 98–110)
CHLORIDE SERPL-SCNC: 111 MMOL/L — HIGH (ref 98–110)
CHLORIDE SERPL-SCNC: 111 MMOL/L — HIGH (ref 98–110)
CHLORIDE SERPL-SCNC: 91 MMOL/L — LOW (ref 98–110)
CHLORIDE SERPL-SCNC: 94 MMOL/L — LOW (ref 98–110)
CHLORIDE SERPL-SCNC: 96 MMOL/L — LOW (ref 98–110)
CHLORIDE SERPL-SCNC: 98 MMOL/L — SIGNIFICANT CHANGE UP (ref 98–110)
CO2 SERPL-SCNC: 12 MMOL/L — LOW (ref 17–32)
CO2 SERPL-SCNC: 12 MMOL/L — LOW (ref 17–32)
CO2 SERPL-SCNC: 13 MMOL/L — LOW (ref 17–32)
CO2 SERPL-SCNC: 15 MMOL/L — LOW (ref 17–32)
CO2 SERPL-SCNC: 17 MMOL/L — SIGNIFICANT CHANGE UP (ref 17–32)
CO2 SERPL-SCNC: 19 MMOL/L — SIGNIFICANT CHANGE UP (ref 17–32)
CO2 SERPL-SCNC: 20 MMOL/L — SIGNIFICANT CHANGE UP (ref 17–32)
CO2 SERPL-SCNC: 21 MMOL/L — SIGNIFICANT CHANGE UP (ref 17–32)
CO2 SERPL-SCNC: 22 MMOL/L — SIGNIFICANT CHANGE UP (ref 17–32)
CO2 SERPL-SCNC: 23 MMOL/L — SIGNIFICANT CHANGE UP (ref 17–32)
CO2 SERPL-SCNC: 24 MMOL/L — SIGNIFICANT CHANGE UP (ref 17–32)
CO2 SERPL-SCNC: 24 MMOL/L — SIGNIFICANT CHANGE UP (ref 17–32)
CO2 SERPL-SCNC: 25 MMOL/L — SIGNIFICANT CHANGE UP (ref 17–32)
CO2 SERPL-SCNC: 26 MMOL/L — SIGNIFICANT CHANGE UP (ref 17–32)
CO2 SERPL-SCNC: 28 MMOL/L — SIGNIFICANT CHANGE UP (ref 17–32)
CREAT SERPL-MCNC: 3.5 MG/DL — HIGH (ref 0.7–1.5)
CREAT SERPL-MCNC: 3.7 MG/DL — HIGH (ref 0.7–1.5)
CREAT SERPL-MCNC: 3.8 MG/DL — HIGH (ref 0.7–1.5)
CREAT SERPL-MCNC: 3.8 MG/DL — HIGH (ref 0.7–1.5)
CREAT SERPL-MCNC: 3.9 MG/DL — HIGH (ref 0.7–1.5)
CREAT SERPL-MCNC: 4.7 MG/DL — CRITICAL HIGH (ref 0.7–1.5)
CREAT SERPL-MCNC: 4.7 MG/DL — CRITICAL HIGH (ref 0.7–1.5)
CREAT SERPL-MCNC: 4.8 MG/DL — CRITICAL HIGH (ref 0.7–1.5)
CREAT SERPL-MCNC: 4.9 MG/DL — CRITICAL HIGH (ref 0.7–1.5)
CREAT SERPL-MCNC: 5.1 MG/DL — CRITICAL HIGH (ref 0.7–1.5)
CREAT SERPL-MCNC: 5.2 MG/DL — CRITICAL HIGH (ref 0.7–1.5)
CREAT SERPL-MCNC: 5.3 MG/DL — CRITICAL HIGH (ref 0.7–1.5)
CREAT SERPL-MCNC: 5.3 MG/DL — CRITICAL HIGH (ref 0.7–1.5)
CREAT SERPL-MCNC: 5.4 MG/DL — CRITICAL HIGH (ref 0.7–1.5)
CREAT SERPL-MCNC: 5.4 MG/DL — CRITICAL HIGH (ref 0.7–1.5)
CREAT SERPL-MCNC: 5.7 MG/DL — CRITICAL HIGH (ref 0.7–1.5)
CREAT SERPL-MCNC: 5.8 MG/DL — CRITICAL HIGH (ref 0.7–1.5)
CREAT SERPL-MCNC: 5.8 MG/DL — CRITICAL HIGH (ref 0.7–1.5)
CREAT SERPL-MCNC: 5.9 MG/DL — CRITICAL HIGH (ref 0.7–1.5)
CREAT SERPL-MCNC: 6 MG/DL — CRITICAL HIGH (ref 0.7–1.5)
CULTURE RESULTS: SIGNIFICANT CHANGE UP
D DIMER BLD IA.RAPID-MCNC: 194 NG/ML DDU — SIGNIFICANT CHANGE UP (ref 0–230)
EOSINOPHIL # BLD AUTO: 0.06 K/UL — SIGNIFICANT CHANGE UP (ref 0–0.7)
EOSINOPHIL # BLD AUTO: 0.1 K/UL — SIGNIFICANT CHANGE UP (ref 0–0.7)
EOSINOPHIL # BLD AUTO: 0.11 K/UL — SIGNIFICANT CHANGE UP (ref 0–0.7)
EOSINOPHIL # BLD AUTO: 0.12 K/UL — SIGNIFICANT CHANGE UP (ref 0–0.7)
EOSINOPHIL # BLD AUTO: 0.12 K/UL — SIGNIFICANT CHANGE UP (ref 0–0.7)
EOSINOPHIL # BLD AUTO: 0.13 K/UL — SIGNIFICANT CHANGE UP (ref 0–0.7)
EOSINOPHIL # BLD AUTO: 0.14 K/UL — SIGNIFICANT CHANGE UP (ref 0–0.7)
EOSINOPHIL # BLD AUTO: 0.16 K/UL — SIGNIFICANT CHANGE UP (ref 0–0.7)
EOSINOPHIL # BLD AUTO: 0.19 K/UL — SIGNIFICANT CHANGE UP (ref 0–0.7)
EOSINOPHIL NFR BLD AUTO: 1.4 % — SIGNIFICANT CHANGE UP (ref 0–8)
EOSINOPHIL NFR BLD AUTO: 1.6 % — SIGNIFICANT CHANGE UP (ref 0–8)
EOSINOPHIL NFR BLD AUTO: 1.8 % — SIGNIFICANT CHANGE UP (ref 0–8)
EOSINOPHIL NFR BLD AUTO: 1.9 % — SIGNIFICANT CHANGE UP (ref 0–8)
EOSINOPHIL NFR BLD AUTO: 2 % — SIGNIFICANT CHANGE UP (ref 0–8)
EOSINOPHIL NFR BLD AUTO: 2 % — SIGNIFICANT CHANGE UP (ref 0–8)
EOSINOPHIL NFR BLD AUTO: 2.1 % — SIGNIFICANT CHANGE UP (ref 0–8)
EOSINOPHIL NFR BLD AUTO: 2.4 % — SIGNIFICANT CHANGE UP (ref 0–8)
EOSINOPHIL NFR BLD AUTO: 2.6 % — SIGNIFICANT CHANGE UP (ref 0–8)
EOSINOPHIL NFR BLD AUTO: 2.9 % — SIGNIFICANT CHANGE UP (ref 0–8)
EOSINOPHIL NFR BLD AUTO: 2.9 % — SIGNIFICANT CHANGE UP (ref 0–8)
EOSINOPHIL NFR BLD AUTO: 3.2 % — SIGNIFICANT CHANGE UP (ref 0–8)
EOSINOPHIL NFR BLD AUTO: 4.2 % — SIGNIFICANT CHANGE UP (ref 0–8)
EOSINOPHIL NFR URNS MANUAL: POSITIVE
FERRITIN SERPL-MCNC: 401 NG/ML — HIGH (ref 15–150)
FERRITIN SERPL-MCNC: 636 NG/ML — HIGH (ref 15–150)
FOLATE SERPL-MCNC: 10.9 NG/ML — SIGNIFICANT CHANGE UP
GAMMA GLOBULIN: 0.5 G/DL — LOW (ref 0.6–1.6)
GAS PNL BLDA: SIGNIFICANT CHANGE UP
GAS PNL BLDV: 132 MMOL/L — LOW (ref 136–145)
GAS PNL BLDV: 135 MMOL/L — LOW (ref 136–145)
GAS PNL BLDV: SIGNIFICANT CHANGE UP
GLUCOSE BLDC GLUCOMTR-MCNC: 100 MG/DL — HIGH (ref 70–99)
GLUCOSE BLDC GLUCOMTR-MCNC: 100 MG/DL — HIGH (ref 70–99)
GLUCOSE BLDC GLUCOMTR-MCNC: 101 MG/DL — HIGH (ref 70–99)
GLUCOSE BLDC GLUCOMTR-MCNC: 101 MG/DL — HIGH (ref 70–99)
GLUCOSE BLDC GLUCOMTR-MCNC: 102 MG/DL — HIGH (ref 70–99)
GLUCOSE BLDC GLUCOMTR-MCNC: 103 MG/DL — HIGH (ref 70–99)
GLUCOSE BLDC GLUCOMTR-MCNC: 104 MG/DL — HIGH (ref 70–99)
GLUCOSE BLDC GLUCOMTR-MCNC: 106 MG/DL — HIGH (ref 70–99)
GLUCOSE BLDC GLUCOMTR-MCNC: 110 MG/DL — HIGH (ref 70–99)
GLUCOSE BLDC GLUCOMTR-MCNC: 110 MG/DL — HIGH (ref 70–99)
GLUCOSE BLDC GLUCOMTR-MCNC: 111 MG/DL — HIGH (ref 70–99)
GLUCOSE BLDC GLUCOMTR-MCNC: 112 MG/DL — HIGH (ref 70–99)
GLUCOSE BLDC GLUCOMTR-MCNC: 113 MG/DL — HIGH (ref 70–99)
GLUCOSE BLDC GLUCOMTR-MCNC: 114 MG/DL — HIGH (ref 70–99)
GLUCOSE BLDC GLUCOMTR-MCNC: 116 MG/DL — HIGH (ref 70–99)
GLUCOSE BLDC GLUCOMTR-MCNC: 116 MG/DL — HIGH (ref 70–99)
GLUCOSE BLDC GLUCOMTR-MCNC: 119 MG/DL — HIGH (ref 70–99)
GLUCOSE BLDC GLUCOMTR-MCNC: 121 MG/DL — HIGH (ref 70–99)
GLUCOSE BLDC GLUCOMTR-MCNC: 122 MG/DL — HIGH (ref 70–99)
GLUCOSE BLDC GLUCOMTR-MCNC: 122 MG/DL — HIGH (ref 70–99)
GLUCOSE BLDC GLUCOMTR-MCNC: 123 MG/DL — HIGH (ref 70–99)
GLUCOSE BLDC GLUCOMTR-MCNC: 125 MG/DL — HIGH (ref 70–99)
GLUCOSE BLDC GLUCOMTR-MCNC: 126 MG/DL — HIGH (ref 70–99)
GLUCOSE BLDC GLUCOMTR-MCNC: 126 MG/DL — HIGH (ref 70–99)
GLUCOSE BLDC GLUCOMTR-MCNC: 129 MG/DL — HIGH (ref 70–99)
GLUCOSE BLDC GLUCOMTR-MCNC: 130 MG/DL — HIGH (ref 70–99)
GLUCOSE BLDC GLUCOMTR-MCNC: 133 MG/DL — HIGH (ref 70–99)
GLUCOSE BLDC GLUCOMTR-MCNC: 134 MG/DL — HIGH (ref 70–99)
GLUCOSE BLDC GLUCOMTR-MCNC: 149 MG/DL — HIGH (ref 70–99)
GLUCOSE BLDC GLUCOMTR-MCNC: 149 MG/DL — HIGH (ref 70–99)
GLUCOSE BLDC GLUCOMTR-MCNC: 150 MG/DL — HIGH (ref 70–99)
GLUCOSE BLDC GLUCOMTR-MCNC: 161 MG/DL — HIGH (ref 70–99)
GLUCOSE BLDC GLUCOMTR-MCNC: 163 MG/DL — HIGH (ref 70–99)
GLUCOSE BLDC GLUCOMTR-MCNC: 164 MG/DL — HIGH (ref 70–99)
GLUCOSE BLDC GLUCOMTR-MCNC: 166 MG/DL — HIGH (ref 70–99)
GLUCOSE BLDC GLUCOMTR-MCNC: 169 MG/DL — HIGH (ref 70–99)
GLUCOSE BLDC GLUCOMTR-MCNC: 181 MG/DL — HIGH (ref 70–99)
GLUCOSE BLDC GLUCOMTR-MCNC: 182 MG/DL — HIGH (ref 70–99)
GLUCOSE BLDC GLUCOMTR-MCNC: 184 MG/DL — HIGH (ref 70–99)
GLUCOSE BLDC GLUCOMTR-MCNC: 202 MG/DL — HIGH (ref 70–99)
GLUCOSE BLDC GLUCOMTR-MCNC: 206 MG/DL — HIGH (ref 70–99)
GLUCOSE BLDC GLUCOMTR-MCNC: 218 MG/DL — HIGH (ref 70–99)
GLUCOSE BLDC GLUCOMTR-MCNC: 222 MG/DL — HIGH (ref 70–99)
GLUCOSE BLDC GLUCOMTR-MCNC: 52 MG/DL — LOW (ref 70–99)
GLUCOSE BLDC GLUCOMTR-MCNC: 57 MG/DL — LOW (ref 70–99)
GLUCOSE BLDC GLUCOMTR-MCNC: 61 MG/DL — LOW (ref 70–99)
GLUCOSE BLDC GLUCOMTR-MCNC: 62 MG/DL — LOW (ref 70–99)
GLUCOSE BLDC GLUCOMTR-MCNC: 65 MG/DL — LOW (ref 70–99)
GLUCOSE BLDC GLUCOMTR-MCNC: 65 MG/DL — LOW (ref 70–99)
GLUCOSE BLDC GLUCOMTR-MCNC: 67 MG/DL — LOW (ref 70–99)
GLUCOSE BLDC GLUCOMTR-MCNC: 72 MG/DL — SIGNIFICANT CHANGE UP (ref 70–99)
GLUCOSE BLDC GLUCOMTR-MCNC: 73 MG/DL — SIGNIFICANT CHANGE UP (ref 70–99)
GLUCOSE BLDC GLUCOMTR-MCNC: 76 MG/DL — SIGNIFICANT CHANGE UP (ref 70–99)
GLUCOSE BLDC GLUCOMTR-MCNC: 77 MG/DL — SIGNIFICANT CHANGE UP (ref 70–99)
GLUCOSE BLDC GLUCOMTR-MCNC: 79 MG/DL — SIGNIFICANT CHANGE UP (ref 70–99)
GLUCOSE BLDC GLUCOMTR-MCNC: 81 MG/DL — SIGNIFICANT CHANGE UP (ref 70–99)
GLUCOSE BLDC GLUCOMTR-MCNC: 81 MG/DL — SIGNIFICANT CHANGE UP (ref 70–99)
GLUCOSE BLDC GLUCOMTR-MCNC: 82 MG/DL — SIGNIFICANT CHANGE UP (ref 70–99)
GLUCOSE BLDC GLUCOMTR-MCNC: 82 MG/DL — SIGNIFICANT CHANGE UP (ref 70–99)
GLUCOSE BLDC GLUCOMTR-MCNC: 86 MG/DL — SIGNIFICANT CHANGE UP (ref 70–99)
GLUCOSE BLDC GLUCOMTR-MCNC: 86 MG/DL — SIGNIFICANT CHANGE UP (ref 70–99)
GLUCOSE BLDC GLUCOMTR-MCNC: 88 MG/DL — SIGNIFICANT CHANGE UP (ref 70–99)
GLUCOSE BLDC GLUCOMTR-MCNC: 90 MG/DL — SIGNIFICANT CHANGE UP (ref 70–99)
GLUCOSE BLDC GLUCOMTR-MCNC: 90 MG/DL — SIGNIFICANT CHANGE UP (ref 70–99)
GLUCOSE BLDC GLUCOMTR-MCNC: 91 MG/DL — SIGNIFICANT CHANGE UP (ref 70–99)
GLUCOSE BLDC GLUCOMTR-MCNC: 91 MG/DL — SIGNIFICANT CHANGE UP (ref 70–99)
GLUCOSE BLDC GLUCOMTR-MCNC: 93 MG/DL — SIGNIFICANT CHANGE UP (ref 70–99)
GLUCOSE BLDC GLUCOMTR-MCNC: 94 MG/DL — SIGNIFICANT CHANGE UP (ref 70–99)
GLUCOSE BLDC GLUCOMTR-MCNC: 95 MG/DL — SIGNIFICANT CHANGE UP (ref 70–99)
GLUCOSE BLDC GLUCOMTR-MCNC: 95 MG/DL — SIGNIFICANT CHANGE UP (ref 70–99)
GLUCOSE BLDC GLUCOMTR-MCNC: 98 MG/DL — SIGNIFICANT CHANGE UP (ref 70–99)
GLUCOSE BLDC GLUCOMTR-MCNC: 99 MG/DL — SIGNIFICANT CHANGE UP (ref 70–99)
GLUCOSE SERPL-MCNC: 105 MG/DL — HIGH (ref 70–99)
GLUCOSE SERPL-MCNC: 112 MG/DL — HIGH (ref 70–99)
GLUCOSE SERPL-MCNC: 114 MG/DL — HIGH (ref 70–99)
GLUCOSE SERPL-MCNC: 119 MG/DL — HIGH (ref 70–99)
GLUCOSE SERPL-MCNC: 120 MG/DL — HIGH (ref 70–99)
GLUCOSE SERPL-MCNC: 122 MG/DL — HIGH (ref 70–99)
GLUCOSE SERPL-MCNC: 126 MG/DL — HIGH (ref 70–99)
GLUCOSE SERPL-MCNC: 154 MG/DL — HIGH (ref 70–99)
GLUCOSE SERPL-MCNC: 171 MG/DL — HIGH (ref 70–99)
GLUCOSE SERPL-MCNC: 194 MG/DL — HIGH (ref 70–99)
GLUCOSE SERPL-MCNC: 50 MG/DL — LOW (ref 70–99)
GLUCOSE SERPL-MCNC: 60 MG/DL — LOW (ref 70–99)
GLUCOSE SERPL-MCNC: 68 MG/DL — LOW (ref 70–99)
GLUCOSE SERPL-MCNC: 81 MG/DL — SIGNIFICANT CHANGE UP (ref 70–99)
GLUCOSE SERPL-MCNC: 83 MG/DL — SIGNIFICANT CHANGE UP (ref 70–99)
GLUCOSE SERPL-MCNC: 93 MG/DL — SIGNIFICANT CHANGE UP (ref 70–99)
GLUCOSE SERPL-MCNC: 95 MG/DL — SIGNIFICANT CHANGE UP (ref 70–99)
GLUCOSE SERPL-MCNC: 97 MG/DL — SIGNIFICANT CHANGE UP (ref 70–99)
GLUCOSE SERPL-MCNC: 98 MG/DL — SIGNIFICANT CHANGE UP (ref 70–99)
GLUCOSE SERPL-MCNC: 98 MG/DL — SIGNIFICANT CHANGE UP (ref 70–99)
HAPTOGLOB SERPL-MCNC: 94 MG/DL — SIGNIFICANT CHANGE UP (ref 34–200)
HAV IGM SER-ACNC: SIGNIFICANT CHANGE UP
HBV CORE IGM SER-ACNC: SIGNIFICANT CHANGE UP
HBV SURFACE AB SER-ACNC: SIGNIFICANT CHANGE UP
HBV SURFACE AG SER-ACNC: SIGNIFICANT CHANGE UP
HCO3 BLDA-SCNC: 12 MMOL/L — LOW (ref 21–29)
HCO3 BLDV-SCNC: 12 MMOL/L — LOW (ref 22–29)
HCO3 BLDV-SCNC: 13 MMOL/L — LOW (ref 22–29)
HCO3 BLDV-SCNC: 13 MMOL/L — LOW (ref 22–29)
HCO3 BLDV-SCNC: 17 MMOL/L — LOW (ref 22–29)
HCO3 BLDV-SCNC: 22 MMOL/L — SIGNIFICANT CHANGE UP (ref 22–29)
HCO3 BLDV-SCNC: 26 MMOL/L — SIGNIFICANT CHANGE UP (ref 22–29)
HCT VFR BLD CALC: 22.4 % — LOW (ref 37–47)
HCT VFR BLD CALC: 23.7 % — LOW (ref 37–47)
HCT VFR BLD CALC: 24 % — LOW (ref 37–47)
HCT VFR BLD CALC: 24.9 % — LOW (ref 37–47)
HCT VFR BLD CALC: 25.7 % — LOW (ref 37–47)
HCT VFR BLD CALC: 25.7 % — LOW (ref 37–47)
HCT VFR BLD CALC: 26.2 % — LOW (ref 37–47)
HCT VFR BLD CALC: 27.4 % — LOW (ref 37–47)
HCT VFR BLD CALC: 27.5 % — LOW (ref 37–47)
HCT VFR BLD CALC: 27.7 % — LOW (ref 37–47)
HCT VFR BLD CALC: 28.1 % — LOW (ref 37–47)
HCT VFR BLD CALC: 28.4 % — LOW (ref 37–47)
HCT VFR BLD CALC: 28.4 % — LOW (ref 37–47)
HCT VFR BLD CALC: 28.7 % — LOW (ref 37–47)
HCT VFR BLD CALC: 28.8 % — LOW (ref 37–47)
HCT VFR BLD CALC: 29 % — LOW (ref 37–47)
HCT VFR BLD CALC: 29.2 % — LOW (ref 37–47)
HCT VFR BLD CALC: 29.9 % — LOW (ref 37–47)
HCT VFR BLD CALC: 30.4 % — LOW (ref 37–47)
HCT VFR BLD CALC: 31.5 % — LOW (ref 37–47)
HCT VFR BLD CALC: 32.6 % — LOW (ref 37–47)
HCT VFR BLD CALC: 32.9 % — LOW (ref 37–47)
HCT VFR BLD CALC: 35.6 % — LOW (ref 37–47)
HCT VFR BLD CALC: 37.9 % — SIGNIFICANT CHANGE UP (ref 37–47)
HCT VFR BLDA CALC: 28.9 % — LOW (ref 34–44)
HCT VFR BLDA CALC: 31.2 % — LOW (ref 34–44)
HCT VFR BLDA CALC: 32.1 % — LOW (ref 34–44)
HCT VFR BLDA CALC: 32.3 % — LOW (ref 34–44)
HCT VFR BLDA CALC: 37 % — SIGNIFICANT CHANGE UP (ref 34–44)
HCV AB S/CO SERPL IA: 0.33 S/CO — SIGNIFICANT CHANGE UP (ref 0–0.99)
HCV AB SERPL-IMP: SIGNIFICANT CHANGE UP
HEPARIN-PF4 AB RESULT: <0.6 U/ML — SIGNIFICANT CHANGE UP (ref 0–0.9)
HGB BLD CALC-MCNC: 10.2 G/DL — LOW (ref 14–18)
HGB BLD CALC-MCNC: 10.5 G/DL — LOW (ref 14–18)
HGB BLD CALC-MCNC: 10.5 G/DL — LOW (ref 14–18)
HGB BLD CALC-MCNC: 12 G/DL — LOW (ref 14–18)
HGB BLD CALC-MCNC: 9.4 G/DL — LOW (ref 14–18)
HGB BLD-MCNC: 10 G/DL — LOW (ref 12–16)
HGB BLD-MCNC: 10.1 G/DL — LOW (ref 12–16)
HGB BLD-MCNC: 10.2 G/DL — LOW (ref 12–16)
HGB BLD-MCNC: 11.4 G/DL — LOW (ref 12–16)
HGB BLD-MCNC: 11.5 G/DL — LOW (ref 12–16)
HGB BLD-MCNC: 6.8 G/DL — CRITICAL LOW (ref 12–16)
HGB BLD-MCNC: 7.2 G/DL — LOW (ref 12–16)
HGB BLD-MCNC: 7.6 G/DL — LOW (ref 12–16)
HGB BLD-MCNC: 7.9 G/DL — LOW (ref 12–16)
HGB BLD-MCNC: 8.1 G/DL — LOW (ref 12–16)
HGB BLD-MCNC: 8.2 G/DL — LOW (ref 12–16)
HGB BLD-MCNC: 8.2 G/DL — LOW (ref 12–16)
HGB BLD-MCNC: 8.5 G/DL — LOW (ref 12–16)
HGB BLD-MCNC: 8.6 G/DL — LOW (ref 12–16)
HGB BLD-MCNC: 8.8 G/DL — LOW (ref 12–16)
HGB BLD-MCNC: 8.9 G/DL — LOW (ref 12–16)
HGB BLD-MCNC: 9.1 G/DL — LOW (ref 12–16)
HGB BLD-MCNC: 9.3 G/DL — LOW (ref 12–16)
HGB BLD-MCNC: 9.4 G/DL — LOW (ref 12–16)
HGB BLD-MCNC: 9.4 G/DL — LOW (ref 12–16)
HGB BLD-MCNC: 9.5 G/DL — LOW (ref 12–16)
HGB BLD-MCNC: 9.5 G/DL — LOW (ref 12–16)
HOROWITZ INDEX BLDA+IHG-RTO: 21 — SIGNIFICANT CHANGE UP
HOROWITZ INDEX BLDV+IHG-RTO: 21 — SIGNIFICANT CHANGE UP
HOROWITZ INDEX BLDV+IHG-RTO: 28 — SIGNIFICANT CHANGE UP
IMM GRANULOCYTES NFR BLD AUTO: 0.2 % — SIGNIFICANT CHANGE UP (ref 0.1–0.3)
IMM GRANULOCYTES NFR BLD AUTO: 0.4 % — HIGH (ref 0.1–0.3)
IMM GRANULOCYTES NFR BLD AUTO: 0.5 % — HIGH (ref 0.1–0.3)
IMM GRANULOCYTES NFR BLD AUTO: 0.5 % — HIGH (ref 0.1–0.3)
IMM GRANULOCYTES NFR BLD AUTO: 0.6 % — HIGH (ref 0.1–0.3)
IMM GRANULOCYTES NFR BLD AUTO: 0.7 % — HIGH (ref 0.1–0.3)
IMM GRANULOCYTES NFR BLD AUTO: 1.1 % — HIGH (ref 0.1–0.3)
INR BLD: 1.01 RATIO — SIGNIFICANT CHANGE UP (ref 0.65–1.3)
INR BLD: 1.02 RATIO — SIGNIFICANT CHANGE UP (ref 0.65–1.3)
INR BLD: 1.03 RATIO — SIGNIFICANT CHANGE UP (ref 0.65–1.3)
INR BLD: 1.04 RATIO — SIGNIFICANT CHANGE UP (ref 0.65–1.3)
INR BLD: 1.06 RATIO — SIGNIFICANT CHANGE UP (ref 0.65–1.3)
INR BLD: 1.12 RATIO — SIGNIFICANT CHANGE UP (ref 0.65–1.3)
INR BLD: 1.13 RATIO — SIGNIFICANT CHANGE UP (ref 0.65–1.3)
INR BLD: 1.16 RATIO — SIGNIFICANT CHANGE UP (ref 0.65–1.3)
INR BLD: 1.23 RATIO — SIGNIFICANT CHANGE UP (ref 0.65–1.3)
INR BLD: 1.24 RATIO — SIGNIFICANT CHANGE UP (ref 0.65–1.3)
INTERPRETATION SERPL IFE-IMP: SIGNIFICANT CHANGE UP
IRON SATN MFR SERPL: 33 % — SIGNIFICANT CHANGE UP (ref 15–50)
IRON SATN MFR SERPL: 55 UG/DL — SIGNIFICANT CHANGE UP (ref 35–150)
KAPPA LC SER QL IFE: 6.76 MG/DL — HIGH (ref 0.33–1.94)
KAPPA/LAMBDA FREE LIGHT CHAIN RATIO, SERUM: 1.89 RATIO — HIGH (ref 0.26–1.65)
LACTATE BLDV-MCNC: 0.4 MMOL/L — LOW (ref 0.5–1.6)
LACTATE BLDV-MCNC: 0.5 MMOL/L — SIGNIFICANT CHANGE UP (ref 0.5–1.6)
LACTATE BLDV-MCNC: 0.6 MMOL/L — SIGNIFICANT CHANGE UP (ref 0.5–1.6)
LACTATE BLDV-MCNC: 0.7 MMOL/L — SIGNIFICANT CHANGE UP (ref 0.5–1.6)
LACTATE BLDV-MCNC: 0.7 MMOL/L — SIGNIFICANT CHANGE UP (ref 0.5–1.6)
LACTATE BLDV-MCNC: 1 MMOL/L — SIGNIFICANT CHANGE UP (ref 0.5–1.6)
LACTATE SERPL-SCNC: 0.7 MMOL/L — SIGNIFICANT CHANGE UP (ref 0.7–2)
LACTATE SERPL-SCNC: 0.8 MMOL/L — SIGNIFICANT CHANGE UP (ref 0.7–2)
LAMBDA LC SER QL IFE: 3.58 MG/DL — HIGH (ref 0.57–2.63)
LDH SERPL L TO P-CCNC: 123 — SIGNIFICANT CHANGE UP (ref 50–242)
LDH SERPL L TO P-CCNC: 158 — SIGNIFICANT CHANGE UP (ref 50–242)
LYMPHOCYTES # BLD AUTO: 0.58 K/UL — LOW (ref 1.2–3.4)
LYMPHOCYTES # BLD AUTO: 0.72 K/UL — LOW (ref 1.2–3.4)
LYMPHOCYTES # BLD AUTO: 0.73 K/UL — LOW (ref 1.2–3.4)
LYMPHOCYTES # BLD AUTO: 0.74 K/UL — LOW (ref 1.2–3.4)
LYMPHOCYTES # BLD AUTO: 0.74 K/UL — LOW (ref 1.2–3.4)
LYMPHOCYTES # BLD AUTO: 0.76 K/UL — LOW (ref 1.2–3.4)
LYMPHOCYTES # BLD AUTO: 0.76 K/UL — LOW (ref 1.2–3.4)
LYMPHOCYTES # BLD AUTO: 0.8 K/UL — LOW (ref 1.2–3.4)
LYMPHOCYTES # BLD AUTO: 0.85 K/UL — LOW (ref 1.2–3.4)
LYMPHOCYTES # BLD AUTO: 0.88 K/UL — LOW (ref 1.2–3.4)
LYMPHOCYTES # BLD AUTO: 0.9 K/UL — LOW (ref 1.2–3.4)
LYMPHOCYTES # BLD AUTO: 0.92 K/UL — LOW (ref 1.2–3.4)
LYMPHOCYTES # BLD AUTO: 1.21 K/UL — SIGNIFICANT CHANGE UP (ref 1.2–3.4)
LYMPHOCYTES # BLD AUTO: 12.3 % — LOW (ref 20.5–51.1)
LYMPHOCYTES # BLD AUTO: 13.4 % — LOW (ref 20.5–51.1)
LYMPHOCYTES # BLD AUTO: 13.5 % — LOW (ref 20.5–51.1)
LYMPHOCYTES # BLD AUTO: 13.6 % — LOW (ref 20.5–51.1)
LYMPHOCYTES # BLD AUTO: 14.3 % — LOW (ref 20.5–51.1)
LYMPHOCYTES # BLD AUTO: 14.5 % — LOW (ref 20.5–51.1)
LYMPHOCYTES # BLD AUTO: 15.5 % — LOW (ref 20.5–51.1)
LYMPHOCYTES # BLD AUTO: 16.4 % — LOW (ref 20.5–51.1)
LYMPHOCYTES # BLD AUTO: 17 % — LOW (ref 20.5–51.1)
LYMPHOCYTES # BLD AUTO: 17.5 % — LOW (ref 20.5–51.1)
LYMPHOCYTES # BLD AUTO: 17.9 % — LOW (ref 20.5–51.1)
LYMPHOCYTES # BLD AUTO: 20.2 % — LOW (ref 20.5–51.1)
LYMPHOCYTES # BLD AUTO: 22.7 % — SIGNIFICANT CHANGE UP (ref 20.5–51.1)
MAGNESIUM SERPL-MCNC: 1.9 MG/DL — SIGNIFICANT CHANGE UP (ref 1.8–2.4)
MAGNESIUM SERPL-MCNC: 2 MG/DL — SIGNIFICANT CHANGE UP (ref 1.8–2.4)
MAGNESIUM SERPL-MCNC: 2.1 MG/DL — SIGNIFICANT CHANGE UP (ref 1.8–2.4)
MAGNESIUM SERPL-MCNC: 2.1 MG/DL — SIGNIFICANT CHANGE UP (ref 1.8–2.4)
MAGNESIUM SERPL-MCNC: 2.2 MG/DL — SIGNIFICANT CHANGE UP (ref 1.8–2.4)
MAGNESIUM SERPL-MCNC: 2.3 MG/DL — SIGNIFICANT CHANGE UP (ref 1.8–2.4)
MCHC RBC-ENTMCNC: 27.8 PG — SIGNIFICANT CHANGE UP (ref 27–31)
MCHC RBC-ENTMCNC: 27.9 PG — SIGNIFICANT CHANGE UP (ref 27–31)
MCHC RBC-ENTMCNC: 27.9 PG — SIGNIFICANT CHANGE UP (ref 27–31)
MCHC RBC-ENTMCNC: 28 PG — SIGNIFICANT CHANGE UP (ref 27–31)
MCHC RBC-ENTMCNC: 28.1 PG — SIGNIFICANT CHANGE UP (ref 27–31)
MCHC RBC-ENTMCNC: 28.2 PG — SIGNIFICANT CHANGE UP (ref 27–31)
MCHC RBC-ENTMCNC: 28.5 PG — SIGNIFICANT CHANGE UP (ref 27–31)
MCHC RBC-ENTMCNC: 28.5 PG — SIGNIFICANT CHANGE UP (ref 27–31)
MCHC RBC-ENTMCNC: 28.8 PG — SIGNIFICANT CHANGE UP (ref 27–31)
MCHC RBC-ENTMCNC: 29 PG — SIGNIFICANT CHANGE UP (ref 27–31)
MCHC RBC-ENTMCNC: 29 PG — SIGNIFICANT CHANGE UP (ref 27–31)
MCHC RBC-ENTMCNC: 29.2 PG — SIGNIFICANT CHANGE UP (ref 27–31)
MCHC RBC-ENTMCNC: 29.5 PG — SIGNIFICANT CHANGE UP (ref 27–31)
MCHC RBC-ENTMCNC: 29.5 PG — SIGNIFICANT CHANGE UP (ref 27–31)
MCHC RBC-ENTMCNC: 29.7 PG — SIGNIFICANT CHANGE UP (ref 27–31)
MCHC RBC-ENTMCNC: 30.1 PG — SIGNIFICANT CHANGE UP (ref 27–31)
MCHC RBC-ENTMCNC: 30.1 PG — SIGNIFICANT CHANGE UP (ref 27–31)
MCHC RBC-ENTMCNC: 30.2 PG — SIGNIFICANT CHANGE UP (ref 27–31)
MCHC RBC-ENTMCNC: 30.3 G/DL — LOW (ref 32–37)
MCHC RBC-ENTMCNC: 30.4 G/DL — LOW (ref 32–37)
MCHC RBC-ENTMCNC: 30.4 G/DL — LOW (ref 32–37)
MCHC RBC-ENTMCNC: 30.4 PG — SIGNIFICANT CHANGE UP (ref 27–31)
MCHC RBC-ENTMCNC: 30.5 PG — SIGNIFICANT CHANGE UP (ref 27–31)
MCHC RBC-ENTMCNC: 30.5 PG — SIGNIFICANT CHANGE UP (ref 27–31)
MCHC RBC-ENTMCNC: 30.7 G/DL — LOW (ref 32–37)
MCHC RBC-ENTMCNC: 30.9 G/DL — LOW (ref 32–37)
MCHC RBC-ENTMCNC: 30.9 PG — SIGNIFICANT CHANGE UP (ref 27–31)
MCHC RBC-ENTMCNC: 31 G/DL — LOW (ref 32–37)
MCHC RBC-ENTMCNC: 31.1 G/DL — LOW (ref 32–37)
MCHC RBC-ENTMCNC: 31.3 G/DL — LOW (ref 32–37)
MCHC RBC-ENTMCNC: 31.5 G/DL — LOW (ref 32–37)
MCHC RBC-ENTMCNC: 31.7 G/DL — LOW (ref 32–37)
MCHC RBC-ENTMCNC: 31.9 G/DL — LOW (ref 32–37)
MCHC RBC-ENTMCNC: 32 G/DL — SIGNIFICANT CHANGE UP (ref 32–37)
MCHC RBC-ENTMCNC: 32.5 G/DL — SIGNIFICANT CHANGE UP (ref 32–37)
MCHC RBC-ENTMCNC: 32.6 G/DL — SIGNIFICANT CHANGE UP (ref 32–37)
MCHC RBC-ENTMCNC: 32.8 G/DL — SIGNIFICANT CHANGE UP (ref 32–37)
MCHC RBC-ENTMCNC: 33.1 G/DL — SIGNIFICANT CHANGE UP (ref 32–37)
MCV RBC AUTO: 88.9 FL — SIGNIFICANT CHANGE UP (ref 81–99)
MCV RBC AUTO: 89.3 FL — SIGNIFICANT CHANGE UP (ref 81–99)
MCV RBC AUTO: 89.5 FL — SIGNIFICANT CHANGE UP (ref 81–99)
MCV RBC AUTO: 89.8 FL — SIGNIFICANT CHANGE UP (ref 81–99)
MCV RBC AUTO: 89.9 FL — SIGNIFICANT CHANGE UP (ref 81–99)
MCV RBC AUTO: 90.1 FL — SIGNIFICANT CHANGE UP (ref 81–99)
MCV RBC AUTO: 90.1 FL — SIGNIFICANT CHANGE UP (ref 81–99)
MCV RBC AUTO: 90.2 FL — SIGNIFICANT CHANGE UP (ref 81–99)
MCV RBC AUTO: 90.2 FL — SIGNIFICANT CHANGE UP (ref 81–99)
MCV RBC AUTO: 90.9 FL — SIGNIFICANT CHANGE UP (ref 81–99)
MCV RBC AUTO: 90.9 FL — SIGNIFICANT CHANGE UP (ref 81–99)
MCV RBC AUTO: 91 FL — SIGNIFICANT CHANGE UP (ref 81–99)
MCV RBC AUTO: 91.1 FL — SIGNIFICANT CHANGE UP (ref 81–99)
MCV RBC AUTO: 91.8 FL — SIGNIFICANT CHANGE UP (ref 81–99)
MCV RBC AUTO: 92.7 FL — SIGNIFICANT CHANGE UP (ref 81–99)
MCV RBC AUTO: 92.7 FL — SIGNIFICANT CHANGE UP (ref 81–99)
MCV RBC AUTO: 95.2 FL — SIGNIFICANT CHANGE UP (ref 81–99)
MCV RBC AUTO: 95.6 FL — SIGNIFICANT CHANGE UP (ref 81–99)
MCV RBC AUTO: 96.8 FL — SIGNIFICANT CHANGE UP (ref 81–99)
MCV RBC AUTO: 97.2 FL — SIGNIFICANT CHANGE UP (ref 81–99)
MCV RBC AUTO: 97.3 FL — SIGNIFICANT CHANGE UP (ref 81–99)
MCV RBC AUTO: 97.9 FL — SIGNIFICANT CHANGE UP (ref 81–99)
MCV RBC AUTO: 97.9 FL — SIGNIFICANT CHANGE UP (ref 81–99)
MCV RBC AUTO: 98.3 FL — SIGNIFICANT CHANGE UP (ref 81–99)
METHOD TYPE: SIGNIFICANT CHANGE UP
MONOCYTES # BLD AUTO: 0.35 K/UL — SIGNIFICANT CHANGE UP (ref 0.1–0.6)
MONOCYTES # BLD AUTO: 0.45 K/UL — SIGNIFICANT CHANGE UP (ref 0.1–0.6)
MONOCYTES # BLD AUTO: 0.47 K/UL — SIGNIFICANT CHANGE UP (ref 0.1–0.6)
MONOCYTES # BLD AUTO: 0.52 K/UL — SIGNIFICANT CHANGE UP (ref 0.1–0.6)
MONOCYTES # BLD AUTO: 0.54 K/UL — SIGNIFICANT CHANGE UP (ref 0.1–0.6)
MONOCYTES # BLD AUTO: 0.54 K/UL — SIGNIFICANT CHANGE UP (ref 0.1–0.6)
MONOCYTES # BLD AUTO: 0.61 K/UL — HIGH (ref 0.1–0.6)
MONOCYTES # BLD AUTO: 0.62 K/UL — HIGH (ref 0.1–0.6)
MONOCYTES # BLD AUTO: 0.62 K/UL — HIGH (ref 0.1–0.6)
MONOCYTES # BLD AUTO: 0.71 K/UL — HIGH (ref 0.1–0.6)
MONOCYTES # BLD AUTO: 0.82 K/UL — HIGH (ref 0.1–0.6)
MONOCYTES # BLD AUTO: 0.82 K/UL — HIGH (ref 0.1–0.6)
MONOCYTES # BLD AUTO: 0.88 K/UL — HIGH (ref 0.1–0.6)
MONOCYTES NFR BLD AUTO: 10 % — HIGH (ref 1.7–9.3)
MONOCYTES NFR BLD AUTO: 11.2 % — HIGH (ref 1.7–9.3)
MONOCYTES NFR BLD AUTO: 11.3 % — HIGH (ref 1.7–9.3)
MONOCYTES NFR BLD AUTO: 11.8 % — HIGH (ref 1.7–9.3)
MONOCYTES NFR BLD AUTO: 12.3 % — HIGH (ref 1.7–9.3)
MONOCYTES NFR BLD AUTO: 13 % — HIGH (ref 1.7–9.3)
MONOCYTES NFR BLD AUTO: 13.4 % — HIGH (ref 1.7–9.3)
MONOCYTES NFR BLD AUTO: 14.3 % — HIGH (ref 1.7–9.3)
MONOCYTES NFR BLD AUTO: 14.4 % — HIGH (ref 1.7–9.3)
MONOCYTES NFR BLD AUTO: 14.9 % — HIGH (ref 1.7–9.3)
MONOCYTES NFR BLD AUTO: 8 % — SIGNIFICANT CHANGE UP (ref 1.7–9.3)
MONOCYTES NFR BLD AUTO: 9.7 % — HIGH (ref 1.7–9.3)
MONOCYTES NFR BLD AUTO: 9.8 % — HIGH (ref 1.7–9.3)
MRSA PCR RESULT.: NEGATIVE — SIGNIFICANT CHANGE UP
MRSA PCR RESULT.: POSITIVE
NEUTROPHILS # BLD AUTO: 2.86 K/UL — SIGNIFICANT CHANGE UP (ref 1.4–6.5)
NEUTROPHILS # BLD AUTO: 2.87 K/UL — SIGNIFICANT CHANGE UP (ref 1.4–6.5)
NEUTROPHILS # BLD AUTO: 2.96 K/UL — SIGNIFICANT CHANGE UP (ref 1.4–6.5)
NEUTROPHILS # BLD AUTO: 3.03 K/UL — SIGNIFICANT CHANGE UP (ref 1.4–6.5)
NEUTROPHILS # BLD AUTO: 3.22 K/UL — SIGNIFICANT CHANGE UP (ref 1.4–6.5)
NEUTROPHILS # BLD AUTO: 3.3 K/UL — SIGNIFICANT CHANGE UP (ref 1.4–6.5)
NEUTROPHILS # BLD AUTO: 3.33 K/UL — SIGNIFICANT CHANGE UP (ref 1.4–6.5)
NEUTROPHILS # BLD AUTO: 3.41 K/UL — SIGNIFICANT CHANGE UP (ref 1.4–6.5)
NEUTROPHILS # BLD AUTO: 3.72 K/UL — SIGNIFICANT CHANGE UP (ref 1.4–6.5)
NEUTROPHILS # BLD AUTO: 4.13 K/UL — SIGNIFICANT CHANGE UP (ref 1.4–6.5)
NEUTROPHILS # BLD AUTO: 4.23 K/UL — SIGNIFICANT CHANGE UP (ref 1.4–6.5)
NEUTROPHILS # BLD AUTO: 4.39 K/UL — SIGNIFICANT CHANGE UP (ref 1.4–6.5)
NEUTROPHILS # BLD AUTO: 4.44 K/UL — SIGNIFICANT CHANGE UP (ref 1.4–6.5)
NEUTROPHILS NFR BLD AUTO: 62.9 % — SIGNIFICANT CHANGE UP (ref 42.2–75.2)
NEUTROPHILS NFR BLD AUTO: 64.1 % — SIGNIFICANT CHANGE UP (ref 42.2–75.2)
NEUTROPHILS NFR BLD AUTO: 65.6 % — SIGNIFICANT CHANGE UP (ref 42.2–75.2)
NEUTROPHILS NFR BLD AUTO: 67.5 % — SIGNIFICANT CHANGE UP (ref 42.2–75.2)
NEUTROPHILS NFR BLD AUTO: 67.7 % — SIGNIFICANT CHANGE UP (ref 42.2–75.2)
NEUTROPHILS NFR BLD AUTO: 68.4 % — SIGNIFICANT CHANGE UP (ref 42.2–75.2)
NEUTROPHILS NFR BLD AUTO: 68.5 % — SIGNIFICANT CHANGE UP (ref 42.2–75.2)
NEUTROPHILS NFR BLD AUTO: 68.6 % — SIGNIFICANT CHANGE UP (ref 42.2–75.2)
NEUTROPHILS NFR BLD AUTO: 69.9 % — SIGNIFICANT CHANGE UP (ref 42.2–75.2)
NEUTROPHILS NFR BLD AUTO: 70.3 % — SIGNIFICANT CHANGE UP (ref 42.2–75.2)
NEUTROPHILS NFR BLD AUTO: 73.1 % — SIGNIFICANT CHANGE UP (ref 42.2–75.2)
NEUTROPHILS NFR BLD AUTO: 73.1 % — SIGNIFICANT CHANGE UP (ref 42.2–75.2)
NEUTROPHILS NFR BLD AUTO: 74.1 % — SIGNIFICANT CHANGE UP (ref 42.2–75.2)
NRBC # BLD: 0 /100 WBCS — SIGNIFICANT CHANGE UP (ref 0–0)
ORGANISM # SPEC MICROSCOPIC CNT: SIGNIFICANT CHANGE UP
ORGANISM # SPEC MICROSCOPIC CNT: SIGNIFICANT CHANGE UP
OTHER CELLS CSF MANUAL: 13 ML/DL — LOW (ref 18–22)
PCO2 BLDA: 35 MMHG — LOW (ref 38–42)
PCO2 BLDV: 40 MMHG — LOW (ref 41–51)
PCO2 BLDV: 42 MMHG — SIGNIFICANT CHANGE UP (ref 41–51)
PCO2 BLDV: 42 MMHG — SIGNIFICANT CHANGE UP (ref 41–51)
PCO2 BLDV: 43 MMHG — SIGNIFICANT CHANGE UP (ref 41–51)
PCO2 BLDV: 45 MMHG — SIGNIFICANT CHANGE UP (ref 41–51)
PCO2 BLDV: 46 MMHG — SIGNIFICANT CHANGE UP (ref 41–51)
PF4 HEPARIN CMPLX AB SER-ACNC: NEGATIVE — SIGNIFICANT CHANGE UP
PH BLDA: 7.14 — CRITICAL LOW (ref 7.38–7.42)
PH BLDV: 7.04 — LOW (ref 7.26–7.43)
PH BLDV: 7.11 — LOW (ref 7.26–7.43)
PH BLDV: 7.12 — LOW (ref 7.26–7.43)
PH BLDV: 7.21 — LOW (ref 7.26–7.43)
PH BLDV: 7.34 — SIGNIFICANT CHANGE UP (ref 7.26–7.43)
PH BLDV: 7.36 — SIGNIFICANT CHANGE UP (ref 7.26–7.43)
PH FLD: 7.6 — SIGNIFICANT CHANGE UP
PHOSPHATE SERPL-MCNC: 4.1 MG/DL — SIGNIFICANT CHANGE UP (ref 2.1–4.9)
PHOSPHATE SERPL-MCNC: 4.3 MG/DL — SIGNIFICANT CHANGE UP (ref 2.1–4.9)
PHOSPHATE SERPL-MCNC: 4.5 MG/DL — SIGNIFICANT CHANGE UP (ref 2.1–4.9)
PHOSPHATE SERPL-MCNC: 4.9 MG/DL — SIGNIFICANT CHANGE UP (ref 2.1–4.9)
PHOSPHATE SERPL-MCNC: 5 MG/DL — HIGH (ref 2.1–4.9)
PHOSPHATE SERPL-MCNC: 5.4 MG/DL — HIGH (ref 2.1–4.9)
PHOSPHATE SERPL-MCNC: 5.8 MG/DL — HIGH (ref 2.1–4.9)
PHOSPHATE SERPL-MCNC: 5.8 MG/DL — HIGH (ref 2.1–4.9)
PHOSPHATE SERPL-MCNC: 6.4 MG/DL — HIGH (ref 2.1–4.9)
PHOSPHATE SERPL-MCNC: 6.5 MG/DL — HIGH (ref 2.1–4.9)
PLATELET # BLD AUTO: 103 K/UL — LOW (ref 130–400)
PLATELET # BLD AUTO: 104 K/UL — LOW (ref 130–400)
PLATELET # BLD AUTO: 114 K/UL — LOW (ref 130–400)
PLATELET # BLD AUTO: 119 K/UL — LOW (ref 130–400)
PLATELET # BLD AUTO: 124 K/UL — LOW (ref 130–400)
PLATELET # BLD AUTO: 126 K/UL — LOW (ref 130–400)
PLATELET # BLD AUTO: 145 K/UL — SIGNIFICANT CHANGE UP (ref 130–400)
PLATELET # BLD AUTO: 146 K/UL — SIGNIFICANT CHANGE UP (ref 130–400)
PLATELET # BLD AUTO: 147 K/UL — SIGNIFICANT CHANGE UP (ref 130–400)
PLATELET # BLD AUTO: 148 K/UL — SIGNIFICANT CHANGE UP (ref 130–400)
PLATELET # BLD AUTO: 151 K/UL — SIGNIFICANT CHANGE UP (ref 130–400)
PLATELET # BLD AUTO: 151 K/UL — SIGNIFICANT CHANGE UP (ref 130–400)
PLATELET # BLD AUTO: 153 K/UL — SIGNIFICANT CHANGE UP (ref 130–400)
PLATELET # BLD AUTO: 158 K/UL — SIGNIFICANT CHANGE UP (ref 130–400)
PLATELET # BLD AUTO: 161 K/UL — SIGNIFICANT CHANGE UP (ref 130–400)
PLATELET # BLD AUTO: 165 K/UL — SIGNIFICANT CHANGE UP (ref 130–400)
PLATELET # BLD AUTO: 166 K/UL — SIGNIFICANT CHANGE UP (ref 130–400)
PLATELET # BLD AUTO: 171 K/UL — SIGNIFICANT CHANGE UP (ref 130–400)
PLATELET # BLD AUTO: 180 K/UL — SIGNIFICANT CHANGE UP (ref 130–400)
PLATELET # BLD AUTO: 181 K/UL — SIGNIFICANT CHANGE UP (ref 130–400)
PLATELET # BLD AUTO: 181 K/UL — SIGNIFICANT CHANGE UP (ref 130–400)
PLATELET # BLD AUTO: 82 K/UL — LOW (ref 130–400)
PLATELET # BLD AUTO: 87 K/UL — LOW (ref 130–400)
PLATELET # BLD AUTO: 95 K/UL — LOW (ref 130–400)
PO2 BLDA: 74 MMHG — LOW (ref 78–95)
PO2 BLDV: 30 MMHG — SIGNIFICANT CHANGE UP (ref 20–40)
PO2 BLDV: 38 MMHG — SIGNIFICANT CHANGE UP (ref 20–40)
PO2 BLDV: 45 MMHG — HIGH (ref 20–40)
PO2 BLDV: 46 MMHG — HIGH (ref 20–40)
PO2 BLDV: 49 MMHG — HIGH (ref 20–40)
PO2 BLDV: 61 MMHG — HIGH (ref 20–40)
POTASSIUM BLDV-SCNC: 3.8 MMOL/L — SIGNIFICANT CHANGE UP (ref 3.3–5.6)
POTASSIUM BLDV-SCNC: 4.4 MMOL/L — SIGNIFICANT CHANGE UP (ref 3.3–5.6)
POTASSIUM BLDV-SCNC: 4.5 MMOL/L — SIGNIFICANT CHANGE UP (ref 3.3–5.6)
POTASSIUM BLDV-SCNC: 4.6 MMOL/L — SIGNIFICANT CHANGE UP (ref 3.3–5.6)
POTASSIUM BLDV-SCNC: 5.1 MMOL/L — SIGNIFICANT CHANGE UP (ref 3.3–5.6)
POTASSIUM SERPL-MCNC: 3.3 MMOL/L — LOW (ref 3.5–5)
POTASSIUM SERPL-MCNC: 4 MMOL/L — SIGNIFICANT CHANGE UP (ref 3.5–5)
POTASSIUM SERPL-MCNC: 4.3 MMOL/L — SIGNIFICANT CHANGE UP (ref 3.5–5)
POTASSIUM SERPL-MCNC: 4.4 MMOL/L — SIGNIFICANT CHANGE UP (ref 3.5–5)
POTASSIUM SERPL-MCNC: 4.4 MMOL/L — SIGNIFICANT CHANGE UP (ref 3.5–5)
POTASSIUM SERPL-MCNC: 4.6 MMOL/L — SIGNIFICANT CHANGE UP (ref 3.5–5)
POTASSIUM SERPL-MCNC: 4.6 MMOL/L — SIGNIFICANT CHANGE UP (ref 3.5–5)
POTASSIUM SERPL-MCNC: 4.7 MMOL/L — SIGNIFICANT CHANGE UP (ref 3.5–5)
POTASSIUM SERPL-MCNC: 4.8 MMOL/L — SIGNIFICANT CHANGE UP (ref 3.5–5)
POTASSIUM SERPL-MCNC: 5 MMOL/L — SIGNIFICANT CHANGE UP (ref 3.5–5)
POTASSIUM SERPL-MCNC: 5.5 MMOL/L — HIGH (ref 3.5–5)
POTASSIUM SERPL-MCNC: 5.7 MMOL/L — HIGH (ref 3.5–5)
POTASSIUM SERPL-MCNC: 5.8 MMOL/L — HIGH (ref 3.5–5)
POTASSIUM SERPL-SCNC: 3.3 MMOL/L — LOW (ref 3.5–5)
POTASSIUM SERPL-SCNC: 4 MMOL/L — SIGNIFICANT CHANGE UP (ref 3.5–5)
POTASSIUM SERPL-SCNC: 4.3 MMOL/L — SIGNIFICANT CHANGE UP (ref 3.5–5)
POTASSIUM SERPL-SCNC: 4.4 MMOL/L — SIGNIFICANT CHANGE UP (ref 3.5–5)
POTASSIUM SERPL-SCNC: 4.4 MMOL/L — SIGNIFICANT CHANGE UP (ref 3.5–5)
POTASSIUM SERPL-SCNC: 4.6 MMOL/L — SIGNIFICANT CHANGE UP (ref 3.5–5)
POTASSIUM SERPL-SCNC: 4.6 MMOL/L — SIGNIFICANT CHANGE UP (ref 3.5–5)
POTASSIUM SERPL-SCNC: 4.7 MMOL/L — SIGNIFICANT CHANGE UP (ref 3.5–5)
POTASSIUM SERPL-SCNC: 4.8 MMOL/L — SIGNIFICANT CHANGE UP (ref 3.5–5)
POTASSIUM SERPL-SCNC: 5 MMOL/L — SIGNIFICANT CHANGE UP (ref 3.5–5)
POTASSIUM SERPL-SCNC: 5.5 MMOL/L — HIGH (ref 3.5–5)
POTASSIUM SERPL-SCNC: 5.7 MMOL/L — HIGH (ref 3.5–5)
POTASSIUM SERPL-SCNC: 5.8 MMOL/L — HIGH (ref 3.5–5)
PROT PATTERN SERPL ELPH-IMP: SIGNIFICANT CHANGE UP
PROT SERPL-MCNC: 4.6 G/DL — LOW (ref 6–8)
PROT SERPL-MCNC: 4.8 G/DL — LOW (ref 6–8)
PROT SERPL-MCNC: 4.9 G/DL — LOW (ref 6–8)
PROT SERPL-MCNC: 5 G/DL — LOW (ref 6–8)
PROT SERPL-MCNC: 5 G/DL — LOW (ref 6–8.3)
PROT SERPL-MCNC: 5.1 G/DL — LOW (ref 6–8)
PROT SERPL-MCNC: 5.2 G/DL — LOW (ref 6–8)
PROT SERPL-MCNC: 5.3 G/DL — LOW (ref 6–8)
PROT SERPL-MCNC: 7.3 G/DL — SIGNIFICANT CHANGE UP (ref 6–8)
PROTHROM AB SERPL-ACNC: 11.6 SEC — SIGNIFICANT CHANGE UP (ref 9.95–12.87)
PROTHROM AB SERPL-ACNC: 11.7 SEC — SIGNIFICANT CHANGE UP (ref 9.95–12.87)
PROTHROM AB SERPL-ACNC: 11.8 SEC — SIGNIFICANT CHANGE UP (ref 9.95–12.87)
PROTHROM AB SERPL-ACNC: 12 SEC — SIGNIFICANT CHANGE UP (ref 9.95–12.87)
PROTHROM AB SERPL-ACNC: 12.2 SEC — SIGNIFICANT CHANGE UP (ref 9.95–12.87)
PROTHROM AB SERPL-ACNC: 12.9 SEC — HIGH (ref 9.95–12.87)
PROTHROM AB SERPL-ACNC: 13 SEC — HIGH (ref 9.95–12.87)
PROTHROM AB SERPL-ACNC: 13.3 SEC — HIGH (ref 9.95–12.87)
PROTHROM AB SERPL-ACNC: 14.1 SEC — HIGH (ref 9.95–12.87)
PROTHROM AB SERPL-ACNC: 14.3 SEC — HIGH (ref 9.95–12.87)
RBC # BLD: 2.44 M/UL — LOW (ref 4.2–5.4)
RBC # BLD: 2.48 M/UL — LOW (ref 4.2–5.4)
RBC # BLD: 2.52 M/UL — LOW (ref 4.2–5.4)
RBC # BLD: 2.74 M/UL — LOW (ref 4.2–5.4)
RBC # BLD: 2.83 M/UL — LOW (ref 4.2–5.4)
RBC # BLD: 2.85 M/UL — LOW (ref 4.2–5.4)
RBC # BLD: 2.87 M/UL — LOW (ref 4.2–5.4)
RBC # BLD: 2.88 M/UL — LOW (ref 4.2–5.4)
RBC # BLD: 2.92 M/UL — LOW (ref 4.2–5.4)
RBC # BLD: 2.92 M/UL — LOW (ref 4.2–5.4)
RBC # BLD: 3.04 M/UL — LOW (ref 4.2–5.4)
RBC # BLD: 3.08 M/UL — LOW (ref 4.2–5.4)
RBC # BLD: 3.09 M/UL — LOW (ref 4.2–5.4)
RBC # BLD: 3.09 M/UL — LOW (ref 4.2–5.4)
RBC # BLD: 3.15 M/UL — LOW (ref 4.2–5.4)
RBC # BLD: 3.22 M/UL — LOW (ref 4.2–5.4)
RBC # BLD: 3.22 M/UL — LOW (ref 4.2–5.4)
RBC # BLD: 3.24 M/UL — LOW (ref 4.2–5.4)
RBC # BLD: 3.24 M/UL — LOW (ref 4.2–5.4)
RBC # BLD: 3.25 M/UL — LOW (ref 4.2–5.4)
RBC # BLD: 3.36 M/UL — LOW (ref 4.2–5.4)
RBC # BLD: 3.38 M/UL — LOW (ref 4.2–5.4)
RBC # BLD: 3.58 M/UL — LOW (ref 4.2–5.4)
RBC # BLD: 3.84 M/UL — LOW (ref 4.2–5.4)
RBC # BLD: 4.09 M/UL — LOW (ref 4.2–5.4)
RBC # FLD: 14.6 % — HIGH (ref 11.5–14.5)
RBC # FLD: 14.6 % — HIGH (ref 11.5–14.5)
RBC # FLD: 14.7 % — HIGH (ref 11.5–14.5)
RBC # FLD: 14.8 % — HIGH (ref 11.5–14.5)
RBC # FLD: 14.8 % — HIGH (ref 11.5–14.5)
RBC # FLD: 14.9 % — HIGH (ref 11.5–14.5)
RBC # FLD: 16.2 % — HIGH (ref 11.5–14.5)
RBC # FLD: 16.3 % — HIGH (ref 11.5–14.5)
RBC # FLD: 16.3 % — HIGH (ref 11.5–14.5)
RBC # FLD: 16.4 % — HIGH (ref 11.5–14.5)
RBC # FLD: 16.4 % — HIGH (ref 11.5–14.5)
RBC # FLD: 16.5 % — HIGH (ref 11.5–14.5)
RBC # FLD: 16.6 % — HIGH (ref 11.5–14.5)
RBC # FLD: 16.7 % — HIGH (ref 11.5–14.5)
RBC # FLD: 16.9 % — HIGH (ref 11.5–14.5)
RBC # FLD: 17 % — HIGH (ref 11.5–14.5)
RBC # FLD: 17 % — HIGH (ref 11.5–14.5)
RBC # FLD: 17.1 % — HIGH (ref 11.5–14.5)
RBC # FLD: 17.4 % — HIGH (ref 11.5–14.5)
RBC # FLD: 18.2 % — HIGH (ref 11.5–14.5)
RBC # FLD: 18.8 % — HIGH (ref 11.5–14.5)
RETICS #: 47.7 K/UL — SIGNIFICANT CHANGE UP (ref 25–125)
RETICS/RBC NFR: 1.5 % — SIGNIFICANT CHANGE UP (ref 0.5–1.5)
SAO2 % BLDA: 95 % — SIGNIFICANT CHANGE UP (ref 92–96)
SAO2 % BLDV: 60 % — SIGNIFICANT CHANGE UP
SAO2 % BLDV: 72 % — SIGNIFICANT CHANGE UP
SAO2 % BLDV: 80 % — SIGNIFICANT CHANGE UP
SAO2 % BLDV: 80 % — SIGNIFICANT CHANGE UP
SAO2 % BLDV: 83 % — SIGNIFICANT CHANGE UP
SAO2 % BLDV: 90 % — SIGNIFICANT CHANGE UP
SARS-COV-2 RNA SPEC QL NAA+PROBE: SIGNIFICANT CHANGE UP
SODIUM SERPL-SCNC: 133 MMOL/L — LOW (ref 135–146)
SODIUM SERPL-SCNC: 134 MMOL/L — LOW (ref 135–146)
SODIUM SERPL-SCNC: 135 MMOL/L — SIGNIFICANT CHANGE UP (ref 135–146)
SODIUM SERPL-SCNC: 135 MMOL/L — SIGNIFICANT CHANGE UP (ref 135–146)
SODIUM SERPL-SCNC: 136 MMOL/L — SIGNIFICANT CHANGE UP (ref 135–146)
SODIUM SERPL-SCNC: 137 MMOL/L — SIGNIFICANT CHANGE UP (ref 135–146)
SODIUM SERPL-SCNC: 138 MMOL/L — SIGNIFICANT CHANGE UP (ref 135–146)
SODIUM SERPL-SCNC: 139 MMOL/L — SIGNIFICANT CHANGE UP (ref 135–146)
SODIUM SERPL-SCNC: 140 MMOL/L — SIGNIFICANT CHANGE UP (ref 135–146)
SODIUM SERPL-SCNC: 141 MMOL/L — SIGNIFICANT CHANGE UP (ref 135–146)
SODIUM SERPL-SCNC: 141 MMOL/L — SIGNIFICANT CHANGE UP (ref 135–146)
SODIUM SERPL-SCNC: 142 MMOL/L — SIGNIFICANT CHANGE UP (ref 135–146)
SPECIMEN SOURCE: SIGNIFICANT CHANGE UP
TIBC SERPL-MCNC: 167 UG/DL — LOW (ref 220–430)
TROPONIN T SERPL-MCNC: 0.2 NG/ML — CRITICAL HIGH
TROPONIN T SERPL-MCNC: 0.2 NG/ML — CRITICAL HIGH
TROPONIN T SERPL-MCNC: 0.21 NG/ML — CRITICAL HIGH
TROPONIN T SERPL-MCNC: 0.21 NG/ML — CRITICAL HIGH
UIBC SERPL-MCNC: 112 UG/DL — SIGNIFICANT CHANGE UP (ref 110–370)
VIT B12 SERPL-MCNC: 1387 PG/ML — HIGH (ref 232–1245)
WBC # BLD: 2.84 K/UL — LOW (ref 4.8–10.8)
WBC # BLD: 3.85 K/UL — LOW (ref 4.8–10.8)
WBC # BLD: 4.18 K/UL — LOW (ref 4.8–10.8)
WBC # BLD: 4.31 K/UL — LOW (ref 4.8–10.8)
WBC # BLD: 4.4 K/UL — LOW (ref 4.8–10.8)
WBC # BLD: 4.48 K/UL — LOW (ref 4.8–10.8)
WBC # BLD: 4.54 K/UL — LOW (ref 4.8–10.8)
WBC # BLD: 4.56 K/UL — LOW (ref 4.8–10.8)
WBC # BLD: 4.62 K/UL — LOW (ref 4.8–10.8)
WBC # BLD: 4.68 K/UL — LOW (ref 4.8–10.8)
WBC # BLD: 4.77 K/UL — LOW (ref 4.8–10.8)
WBC # BLD: 5.03 K/UL — SIGNIFICANT CHANGE UP (ref 4.8–10.8)
WBC # BLD: 5.32 K/UL — SIGNIFICANT CHANGE UP (ref 4.8–10.8)
WBC # BLD: 5.51 K/UL — SIGNIFICANT CHANGE UP (ref 4.8–10.8)
WBC # BLD: 5.57 K/UL — SIGNIFICANT CHANGE UP (ref 4.8–10.8)
WBC # BLD: 5.66 K/UL — SIGNIFICANT CHANGE UP (ref 4.8–10.8)
WBC # BLD: 5.69 K/UL — SIGNIFICANT CHANGE UP (ref 4.8–10.8)
WBC # BLD: 6.01 K/UL — SIGNIFICANT CHANGE UP (ref 4.8–10.8)
WBC # BLD: 6.17 K/UL — SIGNIFICANT CHANGE UP (ref 4.8–10.8)
WBC # BLD: 6.19 K/UL — SIGNIFICANT CHANGE UP (ref 4.8–10.8)
WBC # BLD: 6.32 K/UL — SIGNIFICANT CHANGE UP (ref 4.8–10.8)
WBC # BLD: 6.32 K/UL — SIGNIFICANT CHANGE UP (ref 4.8–10.8)
WBC # BLD: 6.4 K/UL — SIGNIFICANT CHANGE UP (ref 4.8–10.8)
WBC # BLD: 6.88 K/UL — SIGNIFICANT CHANGE UP (ref 4.8–10.8)
WBC # FLD AUTO: 2.84 K/UL — LOW (ref 4.8–10.8)
WBC # FLD AUTO: 3.85 K/UL — LOW (ref 4.8–10.8)
WBC # FLD AUTO: 4.18 K/UL — LOW (ref 4.8–10.8)
WBC # FLD AUTO: 4.31 K/UL — LOW (ref 4.8–10.8)
WBC # FLD AUTO: 4.4 K/UL — LOW (ref 4.8–10.8)
WBC # FLD AUTO: 4.48 K/UL — LOW (ref 4.8–10.8)
WBC # FLD AUTO: 4.54 K/UL — LOW (ref 4.8–10.8)
WBC # FLD AUTO: 4.56 K/UL — LOW (ref 4.8–10.8)
WBC # FLD AUTO: 4.62 K/UL — LOW (ref 4.8–10.8)
WBC # FLD AUTO: 4.68 K/UL — LOW (ref 4.8–10.8)
WBC # FLD AUTO: 4.77 K/UL — LOW (ref 4.8–10.8)
WBC # FLD AUTO: 5.03 K/UL — SIGNIFICANT CHANGE UP (ref 4.8–10.8)
WBC # FLD AUTO: 5.32 K/UL — SIGNIFICANT CHANGE UP (ref 4.8–10.8)
WBC # FLD AUTO: 5.51 K/UL — SIGNIFICANT CHANGE UP (ref 4.8–10.8)
WBC # FLD AUTO: 5.57 K/UL — SIGNIFICANT CHANGE UP (ref 4.8–10.8)
WBC # FLD AUTO: 5.66 K/UL — SIGNIFICANT CHANGE UP (ref 4.8–10.8)
WBC # FLD AUTO: 5.69 K/UL — SIGNIFICANT CHANGE UP (ref 4.8–10.8)
WBC # FLD AUTO: 6.01 K/UL — SIGNIFICANT CHANGE UP (ref 4.8–10.8)
WBC # FLD AUTO: 6.17 K/UL — SIGNIFICANT CHANGE UP (ref 4.8–10.8)
WBC # FLD AUTO: 6.19 K/UL — SIGNIFICANT CHANGE UP (ref 4.8–10.8)
WBC # FLD AUTO: 6.32 K/UL — SIGNIFICANT CHANGE UP (ref 4.8–10.8)
WBC # FLD AUTO: 6.32 K/UL — SIGNIFICANT CHANGE UP (ref 4.8–10.8)
WBC # FLD AUTO: 6.4 K/UL — SIGNIFICANT CHANGE UP (ref 4.8–10.8)
WBC # FLD AUTO: 6.88 K/UL — SIGNIFICANT CHANGE UP (ref 4.8–10.8)

## 2020-01-01 PROCEDURE — 71250 CT THORAX DX C-: CPT | Mod: 26

## 2020-01-01 PROCEDURE — 99291 CRITICAL CARE FIRST HOUR: CPT

## 2020-01-01 PROCEDURE — 99215 OFFICE O/P EST HI 40 MIN: CPT

## 2020-01-01 PROCEDURE — 99214 OFFICE O/P EST MOD 30 MIN: CPT

## 2020-01-01 PROCEDURE — 71045 X-RAY EXAM CHEST 1 VIEW: CPT | Mod: 26

## 2020-01-01 PROCEDURE — 99213 OFFICE O/P EST LOW 20 MIN: CPT | Mod: 25

## 2020-01-01 PROCEDURE — 93306 TTE W/DOPPLER COMPLETE: CPT | Mod: 26

## 2020-01-01 PROCEDURE — 74176 CT ABD & PELVIS W/O CONTRAST: CPT | Mod: 26

## 2020-01-01 PROCEDURE — 99152 MOD SED SAME PHYS/QHP 5/>YRS: CPT

## 2020-01-01 PROCEDURE — A4344: CPT

## 2020-01-01 PROCEDURE — 99233 SBSQ HOSP IP/OBS HIGH 50: CPT

## 2020-01-01 PROCEDURE — ZZZZZ: CPT

## 2020-01-01 PROCEDURE — 93971 EXTREMITY STUDY: CPT | Mod: 26,LT

## 2020-01-01 PROCEDURE — 93312 ECHO TRANSESOPHAGEAL: CPT | Mod: 26

## 2020-01-01 PROCEDURE — 71045 X-RAY EXAM CHEST 1 VIEW: CPT | Mod: 26,77

## 2020-01-01 PROCEDURE — 77001 FLUOROGUIDE FOR VEIN DEVICE: CPT | Mod: 26

## 2020-01-01 PROCEDURE — 71046 X-RAY EXAM CHEST 2 VIEWS: CPT | Mod: 26

## 2020-01-01 PROCEDURE — 93000 ELECTROCARDIOGRAM COMPLETE: CPT

## 2020-01-01 PROCEDURE — 93010 ELECTROCARDIOGRAM REPORT: CPT

## 2020-01-01 PROCEDURE — 93325 DOPPLER ECHO COLOR FLOW MAPG: CPT | Mod: 26

## 2020-01-01 PROCEDURE — 93970 EXTREMITY STUDY: CPT | Mod: 26

## 2020-01-01 PROCEDURE — 77012 CT SCAN FOR NEEDLE BIOPSY: CPT | Mod: 26

## 2020-01-01 PROCEDURE — 93320 DOPPLER ECHO COMPLETE: CPT | Mod: 26

## 2020-01-01 PROCEDURE — 99232 SBSQ HOSP IP/OBS MODERATE 35: CPT

## 2020-01-01 PROCEDURE — 76937 US GUIDE VASCULAR ACCESS: CPT | Mod: 26

## 2020-01-01 PROCEDURE — 36558 INSERT TUNNELED CV CATH: CPT

## 2020-01-01 PROCEDURE — 74018 RADEX ABDOMEN 1 VIEW: CPT | Mod: 26

## 2020-01-01 PROCEDURE — 99291 CRITICAL CARE FIRST HOUR: CPT | Mod: GC

## 2020-01-01 PROCEDURE — 99222 1ST HOSP IP/OBS MODERATE 55: CPT

## 2020-01-01 RX ORDER — AMIODARONE HYDROCHLORIDE 400 MG/1
1 TABLET ORAL
Qty: 900 | Refills: 0 | Status: DISCONTINUED | OUTPATIENT
Start: 2020-01-01 | End: 2020-01-01

## 2020-01-01 RX ORDER — DEXTROSE 50 % IN WATER 50 %
25 SYRINGE (ML) INTRAVENOUS ONCE
Refills: 0 | Status: DISCONTINUED | OUTPATIENT
Start: 2020-01-01 | End: 2020-01-01

## 2020-01-01 RX ORDER — METOPROLOL TARTRATE 50 MG
25 TABLET ORAL DAILY
Refills: 0 | Status: DISCONTINUED | OUTPATIENT
Start: 2020-01-01 | End: 2020-01-01

## 2020-01-01 RX ORDER — METHOCARBAMOL 500 MG/1
500 TABLET, FILM COATED ORAL THREE TIMES A DAY
Refills: 0 | Status: DISCONTINUED | OUTPATIENT
Start: 2020-01-01 | End: 2020-01-01

## 2020-01-01 RX ORDER — VANCOMYCIN HCL 1 G
750 VIAL (EA) INTRAVENOUS
Refills: 0 | Status: DISCONTINUED | OUTPATIENT
Start: 2020-01-01 | End: 2020-01-01

## 2020-01-01 RX ORDER — FUROSEMIDE 20 MG/1
20 TABLET ORAL TWICE DAILY
Qty: 90 | Refills: 2 | Status: DISCONTINUED | COMMUNITY
Start: 2019-11-24 | End: 2020-01-01

## 2020-01-01 RX ORDER — TIOTROPIUM BROMIDE 18 UG/1
1 CAPSULE ORAL; RESPIRATORY (INHALATION) DAILY
Refills: 0 | Status: DISCONTINUED | OUTPATIENT
Start: 2020-01-01 | End: 2020-01-01

## 2020-01-01 RX ORDER — MEGESTROL ACETATE 40 MG/ML
40 SUSPENSION ORAL DAILY
Refills: 0 | Status: COMPLETED | COMMUNITY
Start: 2019-11-24 | End: 2020-01-01

## 2020-01-01 RX ORDER — METOPROLOL TARTRATE 50 MG
12.5 TABLET ORAL
Refills: 0 | Status: DISCONTINUED | OUTPATIENT
Start: 2020-01-01 | End: 2020-01-01

## 2020-01-01 RX ORDER — CHOLECALCIFEROL (VITAMIN D3) 125 MCG
1000 CAPSULE ORAL DAILY
Refills: 0 | Status: DISCONTINUED | OUTPATIENT
Start: 2020-01-01 | End: 2020-01-01

## 2020-01-01 RX ORDER — HEPARIN SODIUM 5000 [USP'U]/ML
600 INJECTION INTRAVENOUS; SUBCUTANEOUS
Qty: 25000 | Refills: 0 | Status: DISCONTINUED | OUTPATIENT
Start: 2020-01-01 | End: 2020-01-01

## 2020-01-01 RX ORDER — LANCETS 28 GAUGE
EACH MISCELLANEOUS
Qty: 100 | Refills: 0 | Status: COMPLETED | COMMUNITY
Start: 2019-08-21 | End: 2020-01-01

## 2020-01-01 RX ORDER — AMIODARONE HYDROCHLORIDE 400 MG/1
1 TABLET ORAL
Qty: 30 | Refills: 0
Start: 2020-01-01 | End: 2020-01-01

## 2020-01-01 RX ORDER — HEPARIN SODIUM 5000 [USP'U]/ML
5000 INJECTION, SOLUTION INTRAVENOUS; SUBCUTANEOUS EVERY 8 HOURS
Refills: 0 | Status: COMPLETED | COMMUNITY
Start: 2019-11-24 | End: 2020-01-01

## 2020-01-01 RX ORDER — AMIODARONE HYDROCHLORIDE 400 MG/1
200 TABLET ORAL DAILY
Refills: 0 | Status: DISCONTINUED | OUTPATIENT
Start: 2020-01-01 | End: 2020-01-01

## 2020-01-01 RX ORDER — TIOTROPIUM BROMIDE 18 UG/1
1 CAPSULE ORAL; RESPIRATORY (INHALATION) ONCE
Refills: 0 | Status: COMPLETED | OUTPATIENT
Start: 2020-01-01 | End: 2020-01-01

## 2020-01-01 RX ORDER — INSULIN GLARGINE 100 [IU]/ML
12 INJECTION, SOLUTION SUBCUTANEOUS EVERY MORNING
Refills: 0 | Status: DISCONTINUED | OUTPATIENT
Start: 2020-01-01 | End: 2020-01-01

## 2020-01-01 RX ORDER — PANTOPRAZOLE SODIUM 20 MG/1
40 TABLET, DELAYED RELEASE ORAL
Refills: 0 | Status: DISCONTINUED | OUTPATIENT
Start: 2020-01-01 | End: 2020-01-01

## 2020-01-01 RX ORDER — INSULIN LISPRO 100/ML
3 VIAL (ML) SUBCUTANEOUS
Refills: 0 | Status: DISCONTINUED | OUTPATIENT
Start: 2020-01-01 | End: 2020-01-01

## 2020-01-01 RX ORDER — VASOPRESSIN 20 [USP'U]/ML
0.04 INJECTION INTRAVENOUS
Qty: 50 | Refills: 0 | Status: DISCONTINUED | OUTPATIENT
Start: 2020-01-01 | End: 2020-01-01

## 2020-01-01 RX ORDER — INSULIN GLARGINE 100 [IU]/ML
9 INJECTION, SOLUTION SUBCUTANEOUS EVERY MORNING
Refills: 0 | Status: DISCONTINUED | OUTPATIENT
Start: 2020-01-01 | End: 2020-01-01

## 2020-01-01 RX ORDER — FUROSEMIDE 40 MG
180 TABLET ORAL ONCE
Refills: 0 | Status: COMPLETED | OUTPATIENT
Start: 2020-01-01 | End: 2020-01-01

## 2020-01-01 RX ORDER — SERTRALINE HYDROCHLORIDE 20 MG/ML
SOLUTION, CONCENTRATE ORAL
Refills: 0 | Status: ACTIVE | COMMUNITY

## 2020-01-01 RX ORDER — ATORVASTATIN CALCIUM 80 MG/1
40 TABLET, FILM COATED ORAL AT BEDTIME
Refills: 0 | Status: DISCONTINUED | OUTPATIENT
Start: 2020-01-01 | End: 2020-01-01

## 2020-01-01 RX ORDER — NAFCILLIN 10 G/100ML
INJECTION, POWDER, FOR SOLUTION INTRAVENOUS
Refills: 0 | Status: DISCONTINUED | OUTPATIENT
Start: 2020-01-01 | End: 2020-01-01

## 2020-01-01 RX ORDER — CARVEDILOL 25 MG/1
25 TABLET, FILM COATED ORAL
Refills: 0 | Status: DISCONTINUED | COMMUNITY
End: 2020-01-01

## 2020-01-01 RX ORDER — POTASSIUM CHLORIDE 20 MEQ
20 PACKET (EA) ORAL
Refills: 0 | Status: COMPLETED | OUTPATIENT
Start: 2020-01-01 | End: 2020-01-01

## 2020-01-01 RX ORDER — MULTIVIT-MIN/FOLIC/VIT K/LYCOP 400-300MCG
500 TABLET ORAL DAILY
Refills: 0 | Status: ACTIVE | COMMUNITY
Start: 2019-11-24

## 2020-01-01 RX ORDER — NAFCILLIN 10 G/100ML
2 INJECTION, POWDER, FOR SOLUTION INTRAVENOUS ONCE
Refills: 0 | Status: COMPLETED | OUTPATIENT
Start: 2020-01-01 | End: 2020-01-01

## 2020-01-01 RX ORDER — SODIUM BICARBONATE 1 MEQ/ML
650 SYRINGE (ML) INTRAVENOUS EVERY 12 HOURS
Refills: 0 | Status: DISCONTINUED | OUTPATIENT
Start: 2020-01-01 | End: 2020-01-01

## 2020-01-01 RX ORDER — AMIODARONE HYDROCHLORIDE 200 MG/1
200 TABLET ORAL
Qty: 90 | Refills: 3 | Status: ACTIVE | COMMUNITY
Start: 2020-01-01 | End: 1900-01-01

## 2020-01-01 RX ORDER — NYSTATIN 100000 U/G
100000 OINTMENT TOPICAL
Refills: 0 | Status: COMPLETED | COMMUNITY
Start: 2019-11-24 | End: 2020-01-01

## 2020-01-01 RX ORDER — ALPRAZOLAM 0.25 MG
0.25 TABLET ORAL ONCE
Refills: 0 | Status: DISCONTINUED | OUTPATIENT
Start: 2020-01-01 | End: 2020-01-01

## 2020-01-01 RX ORDER — AMIODARONE HYDROCHLORIDE 400 MG/1
0.5 TABLET ORAL
Qty: 900 | Refills: 0 | Status: DISCONTINUED | OUTPATIENT
Start: 2020-01-01 | End: 2020-01-01

## 2020-01-01 RX ORDER — INSULIN LISPRO 100/ML
4 VIAL (ML) SUBCUTANEOUS
Refills: 0 | Status: DISCONTINUED | OUTPATIENT
Start: 2020-01-01 | End: 2020-01-01

## 2020-01-01 RX ORDER — METOPROLOL TARTRATE 50 MG
25 TABLET ORAL EVERY 12 HOURS
Refills: 0 | Status: DISCONTINUED | OUTPATIENT
Start: 2020-01-01 | End: 2020-01-01

## 2020-01-01 RX ORDER — ISOSORBIDE DINITRATE 10 MG/1
10 TABLET ORAL EVERY 8 HOURS
Qty: 270 | Refills: 3 | Status: ACTIVE | COMMUNITY
Start: 2020-01-01

## 2020-01-01 RX ORDER — METOPROLOL TARTRATE 50 MG
25 TABLET ORAL EVERY 8 HOURS
Refills: 0 | Status: DISCONTINUED | OUTPATIENT
Start: 2020-01-01 | End: 2020-01-01

## 2020-01-01 RX ORDER — SILVER SULFADIAZINE 10 MG/G
1 CREAM TOPICAL
Qty: 50 | Refills: 0 | Status: COMPLETED | COMMUNITY
Start: 2019-10-24 | End: 2020-01-01

## 2020-01-01 RX ORDER — INSULIN LISPRO 100/ML
2 VIAL (ML) SUBCUTANEOUS
Qty: 3 | Refills: 0
Start: 2020-01-01 | End: 2020-01-01

## 2020-01-01 RX ORDER — AMIODARONE HYDROCHLORIDE 400 MG/1
TABLET ORAL
Refills: 0 | Status: DISCONTINUED | OUTPATIENT
Start: 2020-01-01 | End: 2020-01-01

## 2020-01-01 RX ORDER — SODIUM CHLORIDE 9 MG/ML
1000 INJECTION INTRAMUSCULAR; INTRAVENOUS; SUBCUTANEOUS
Refills: 0 | Status: DISCONTINUED | OUTPATIENT
Start: 2020-01-01 | End: 2020-01-01

## 2020-01-01 RX ORDER — METOPROLOL TARTRATE 50 MG
50 TABLET ORAL
Refills: 0 | Status: DISCONTINUED | OUTPATIENT
Start: 2020-01-01 | End: 2020-01-01

## 2020-01-01 RX ORDER — ASPIRIN 81 MG
81 TABLET, DELAYED RELEASE (ENTERIC COATED) ORAL DAILY
Refills: 0 | Status: ACTIVE | COMMUNITY

## 2020-01-01 RX ORDER — SERTRALINE 25 MG/1
25 TABLET, FILM COATED ORAL DAILY
Qty: 60 | Refills: 0 | Status: COMPLETED | COMMUNITY
Start: 2019-11-24 | End: 2020-01-01

## 2020-01-01 RX ORDER — LANOLIN ALCOHOL/MO/W.PET/CERES
1 CREAM (GRAM) TOPICAL
Qty: 30 | Refills: 0
Start: 2020-01-01 | End: 2020-01-01

## 2020-01-01 RX ORDER — L. ACIDOPHILUS/L.BULGARICUS 1MM CELL
TABLET ORAL 3 TIMES DAILY
Refills: 0 | Status: COMPLETED | COMMUNITY
Start: 2019-11-24 | End: 2020-01-01

## 2020-01-01 RX ORDER — MUPIROCIN 20 MG/G
1 OINTMENT TOPICAL
Refills: 0 | Status: DISCONTINUED | OUTPATIENT
Start: 2020-01-01 | End: 2020-01-01

## 2020-01-01 RX ORDER — CARVEDILOL 25 MG/1
25 TABLET, FILM COATED ORAL
Qty: 180 | Refills: 0 | Status: DISCONTINUED | COMMUNITY
Start: 2020-02-03 | End: 2020-01-01

## 2020-01-01 RX ORDER — ATORVASTATIN CALCIUM 40 MG/1
40 TABLET, FILM COATED ORAL
Qty: 90 | Refills: 1 | Status: DISCONTINUED | COMMUNITY
Start: 2019-08-15 | End: 2020-01-01

## 2020-01-01 RX ORDER — APIXABAN 2.5 MG/1
1 TABLET, FILM COATED ORAL
Qty: 60 | Refills: 0
Start: 2020-01-01 | End: 2020-01-01

## 2020-01-01 RX ORDER — PATIROMER 16.8 G/1
16.8 POWDER, FOR SUSPENSION ORAL DAILY
Qty: 30 | Refills: 0 | Status: DISCONTINUED | COMMUNITY
Start: 2020-01-17 | End: 2020-01-01

## 2020-01-01 RX ORDER — HEPARIN SODIUM 5000 [USP'U]/ML
1100 INJECTION INTRAVENOUS; SUBCUTANEOUS
Qty: 25000 | Refills: 0 | Status: DISCONTINUED | OUTPATIENT
Start: 2020-01-01 | End: 2020-01-01

## 2020-01-01 RX ORDER — HEPARIN SODIUM 5000 [USP'U]/ML
400 INJECTION INTRAVENOUS; SUBCUTANEOUS
Qty: 25000 | Refills: 0 | Status: DISCONTINUED | OUTPATIENT
Start: 2020-01-01 | End: 2020-01-01

## 2020-01-01 RX ORDER — IRON SUCROSE 20 MG/ML
100 INJECTION, SOLUTION INTRAVENOUS ONCE
Refills: 0 | Status: COMPLETED | OUTPATIENT
Start: 2020-01-01 | End: 2020-01-01

## 2020-01-01 RX ORDER — SODIUM CHLORIDE 9 MG/ML
250 INJECTION INTRAMUSCULAR; INTRAVENOUS; SUBCUTANEOUS ONCE
Refills: 0 | Status: DISCONTINUED | OUTPATIENT
Start: 2020-01-01 | End: 2020-01-01

## 2020-01-01 RX ORDER — PANTOPRAZOLE 40 MG/1
40 TABLET, DELAYED RELEASE ORAL DAILY
Refills: 0 | Status: ACTIVE | COMMUNITY

## 2020-01-01 RX ORDER — MAGNESIUM 200 MG
200 TABLET ORAL
Refills: 0 | Status: COMPLETED | COMMUNITY
End: 2020-01-01

## 2020-01-01 RX ORDER — ATORVASTATIN CALCIUM 40 MG/1
40 TABLET, FILM COATED ORAL
Refills: 0 | Status: COMPLETED | COMMUNITY
End: 2020-01-01

## 2020-01-01 RX ORDER — SENNA PLUS 8.6 MG/1
2 TABLET ORAL AT BEDTIME
Refills: 0 | Status: DISCONTINUED | OUTPATIENT
Start: 2020-01-01 | End: 2020-01-01

## 2020-01-01 RX ORDER — WARFARIN SODIUM 2.5 MG/1
5 TABLET ORAL ONCE
Refills: 0 | Status: COMPLETED | OUTPATIENT
Start: 2020-01-01 | End: 2020-01-01

## 2020-01-01 RX ORDER — VITAMIN B COMPLEX
CAPSULE ORAL
Refills: 0 | Status: COMPLETED | COMMUNITY
End: 2020-01-01

## 2020-01-01 RX ORDER — METOPROLOL TARTRATE 50 MG
12.5 TABLET ORAL EVERY 6 HOURS
Refills: 0 | Status: DISCONTINUED | OUTPATIENT
Start: 2020-01-01 | End: 2020-01-01

## 2020-01-01 RX ORDER — ASPIRIN/CALCIUM CARB/MAGNESIUM 324 MG
81 TABLET ORAL DAILY
Refills: 0 | Status: DISCONTINUED | OUTPATIENT
Start: 2020-01-01 | End: 2020-01-01

## 2020-01-01 RX ORDER — SERTRALINE 25 MG/1
25 TABLET, FILM COATED ORAL DAILY
Refills: 0 | Status: DISCONTINUED | OUTPATIENT
Start: 2020-01-01 | End: 2020-01-01

## 2020-01-01 RX ORDER — HYDRALAZINE HCL 50 MG
1 TABLET ORAL
Qty: 90 | Refills: 0
Start: 2020-01-01 | End: 2020-01-01

## 2020-01-01 RX ORDER — GUAIFENESIN/DEXTROMETHORPHAN 600MG-30MG
10 TABLET, EXTENDED RELEASE 12 HR ORAL EVERY 6 HOURS
Refills: 0 | Status: DISCONTINUED | OUTPATIENT
Start: 2020-01-01 | End: 2020-01-01

## 2020-01-01 RX ORDER — CHLORHEXIDINE GLUCONATE 213 G/1000ML
1 SOLUTION TOPICAL
Refills: 0 | Status: DISCONTINUED | OUTPATIENT
Start: 2020-01-01 | End: 2020-01-01

## 2020-01-01 RX ORDER — SODIUM CHLORIDE 9 MG/ML
250 INJECTION, SOLUTION INTRAVENOUS ONCE
Refills: 0 | Status: COMPLETED | OUTPATIENT
Start: 2020-01-01 | End: 2020-01-01

## 2020-01-01 RX ORDER — PANTOPRAZOLE SODIUM 20 MG/1
1 TABLET, DELAYED RELEASE ORAL
Qty: 30 | Refills: 0
Start: 2020-01-01 | End: 2020-01-01

## 2020-01-01 RX ORDER — LINEZOLID 600 MG/300ML
600 INJECTION, SOLUTION INTRAVENOUS EVERY 12 HOURS
Refills: 0 | Status: DISCONTINUED | OUTPATIENT
Start: 2020-01-01 | End: 2020-01-01

## 2020-01-01 RX ORDER — NOREPINEPHRINE BITARTRATE/D5W 8 MG/250ML
0.05 PLASTIC BAG, INJECTION (ML) INTRAVENOUS
Qty: 16 | Refills: 0 | Status: DISCONTINUED | OUTPATIENT
Start: 2020-01-01 | End: 2020-01-01

## 2020-01-01 RX ORDER — HYDRALAZINE HYDROCHLORIDE 25 MG/1
25 TABLET ORAL
Qty: 270 | Refills: 3 | Status: ACTIVE | COMMUNITY
Start: 2020-01-01 | End: 1900-01-01

## 2020-01-01 RX ORDER — ISOSORBIDE DINITRATE 5 MG/1
10 TABLET ORAL THREE TIMES A DAY
Refills: 0 | Status: DISCONTINUED | OUTPATIENT
Start: 2020-01-01 | End: 2020-01-01

## 2020-01-01 RX ORDER — SACUBITRIL AND VALSARTAN 49; 51 MG/1; MG/1
49-51 TABLET, FILM COATED ORAL
Refills: 0 | Status: DISCONTINUED | COMMUNITY
End: 2020-01-01

## 2020-01-01 RX ORDER — IVABRADINE 7.5 MG/1
TABLET, FILM COATED ORAL
Refills: 0 | Status: DISCONTINUED | COMMUNITY
End: 2020-01-01

## 2020-01-01 RX ORDER — ENOXAPARIN SODIUM 100 MG/ML
5 INJECTION SUBCUTANEOUS
Qty: 2 | Refills: 0
Start: 2020-01-01 | End: 2020-01-01

## 2020-01-01 RX ORDER — METFORMIN HYDROCHLORIDE 500 MG/1
500 TABLET, COATED ORAL
Refills: 0 | Status: COMPLETED | COMMUNITY
End: 2020-01-01

## 2020-01-01 RX ORDER — ASPIRIN 81 MG
81 TABLET, DELAYED RELEASE (ENTERIC COATED) ORAL
Refills: 0 | Status: COMPLETED | COMMUNITY
End: 2020-01-01

## 2020-01-01 RX ORDER — INSULIN GLARGINE 100 [IU]/ML
5 INJECTION, SOLUTION SUBCUTANEOUS AT BEDTIME
Refills: 0 | Status: DISCONTINUED | OUTPATIENT
Start: 2020-01-01 | End: 2020-01-01

## 2020-01-01 RX ORDER — LANOLIN ALCOHOL/MO/W.PET/CERES
3 CREAM (GRAM) TOPICAL AT BEDTIME
Refills: 0 | Status: DISCONTINUED | OUTPATIENT
Start: 2020-01-01 | End: 2020-01-01

## 2020-01-01 RX ORDER — INSULIN LISPRO 100/ML
2 VIAL (ML) SUBCUTANEOUS
Qty: 9 | Refills: 0
Start: 2020-01-01 | End: 2020-01-01

## 2020-01-01 RX ORDER — MELATONIN 5 MG
5 CAPSULE ORAL
Refills: 0 | Status: COMPLETED | COMMUNITY
End: 2020-01-01

## 2020-01-01 RX ORDER — GLUCAGON INJECTION, SOLUTION 0.5 MG/.1ML
1 INJECTION, SOLUTION SUBCUTANEOUS ONCE
Refills: 0 | Status: DISCONTINUED | OUTPATIENT
Start: 2020-01-01 | End: 2020-01-01

## 2020-01-01 RX ORDER — CEFTRIAXONE 500 MG/1
INJECTION, POWDER, FOR SOLUTION INTRAMUSCULAR; INTRAVENOUS
Refills: 0 | Status: DISCONTINUED | OUTPATIENT
Start: 2020-01-01 | End: 2020-01-01

## 2020-01-01 RX ORDER — NAFCILLIN 10 G/100ML
2 INJECTION, POWDER, FOR SOLUTION INTRAVENOUS EVERY 4 HOURS
Refills: 0 | Status: DISCONTINUED | OUTPATIENT
Start: 2020-01-01 | End: 2020-01-01

## 2020-01-01 RX ORDER — HYDRALAZINE HCL 50 MG
25 TABLET ORAL THREE TIMES A DAY
Refills: 0 | Status: DISCONTINUED | OUTPATIENT
Start: 2020-01-01 | End: 2020-01-01

## 2020-01-01 RX ORDER — ADENOSINE 3 MG/ML
12 INJECTION INTRAVENOUS ONCE
Refills: 0 | Status: COMPLETED | OUTPATIENT
Start: 2020-01-01 | End: 2020-01-01

## 2020-01-01 RX ORDER — HEPARIN SODIUM 5000 [USP'U]/ML
800 INJECTION INTRAVENOUS; SUBCUTANEOUS
Qty: 25000 | Refills: 0 | Status: DISCONTINUED | OUTPATIENT
Start: 2020-01-01 | End: 2020-01-01

## 2020-01-01 RX ORDER — IRON SUCROSE 20 MG/ML
500 INJECTION, SOLUTION INTRAVENOUS ONCE
Refills: 0 | Status: COMPLETED | OUTPATIENT
Start: 2020-01-01 | End: 2020-01-01

## 2020-01-01 RX ORDER — DEXTROSE 50 % IN WATER 50 %
15 SYRINGE (ML) INTRAVENOUS ONCE
Refills: 0 | Status: DISCONTINUED | OUTPATIENT
Start: 2020-01-01 | End: 2020-01-01

## 2020-01-01 RX ORDER — METOPROLOL TARTRATE 50 MG
5 TABLET ORAL ONCE
Refills: 0 | Status: DISCONTINUED | OUTPATIENT
Start: 2020-01-01 | End: 2020-01-01

## 2020-01-01 RX ORDER — AMIODARONE HYDROCHLORIDE 400 MG/1
150 TABLET ORAL ONCE
Refills: 0 | Status: COMPLETED | OUTPATIENT
Start: 2020-01-01 | End: 2020-01-01

## 2020-01-01 RX ORDER — IVABRADINE 5 MG/1
5 TABLET, FILM COATED ORAL
Qty: 60 | Refills: 2 | Status: DISCONTINUED | COMMUNITY
Start: 2019-12-13 | End: 2020-01-01

## 2020-01-01 RX ORDER — ZOLPIDEM TARTRATE 10 MG/1
5 TABLET ORAL ONCE
Refills: 0 | Status: DISCONTINUED | OUTPATIENT
Start: 2020-01-01 | End: 2020-01-01

## 2020-01-01 RX ORDER — SODIUM BICARBONATE 1 MEQ/ML
650 SYRINGE (ML) INTRAVENOUS EVERY 6 HOURS
Refills: 0 | Status: DISCONTINUED | OUTPATIENT
Start: 2020-01-01 | End: 2020-01-01

## 2020-01-01 RX ORDER — ACETAMINOPHEN 500 MG
650 TABLET ORAL EVERY 6 HOURS
Refills: 0 | Status: DISCONTINUED | OUTPATIENT
Start: 2020-01-01 | End: 2020-01-01

## 2020-01-01 RX ORDER — HEPARIN SODIUM 5000 [USP'U]/ML
5000 INJECTION INTRAVENOUS; SUBCUTANEOUS EVERY 8 HOURS
Refills: 0 | Status: DISCONTINUED | OUTPATIENT
Start: 2020-01-01 | End: 2020-01-01

## 2020-01-01 RX ORDER — TORSEMIDE 20 MG/1
20 TABLET ORAL
Qty: 270 | Refills: 3 | Status: ACTIVE | COMMUNITY
Start: 2020-01-01 | End: 1900-01-01

## 2020-01-01 RX ORDER — MIRTAZAPINE 7.5 MG/1
7.5 TABLET, FILM COATED ORAL
Refills: 0 | Status: COMPLETED | COMMUNITY
Start: 2019-11-24 | End: 2020-01-01

## 2020-01-01 RX ORDER — SACUBITRIL AND VALSARTAN 24; 26 MG/1; MG/1
24-26 TABLET, FILM COATED ORAL TWICE DAILY
Qty: 60 | Refills: 3 | Status: DISCONTINUED | COMMUNITY
Start: 1900-01-01 | End: 2020-01-01

## 2020-01-01 RX ORDER — METOPROLOL TARTRATE 50 MG
1 TABLET ORAL
Qty: 60 | Refills: 0
Start: 2020-01-01 | End: 2020-01-01

## 2020-01-01 RX ORDER — GABAPENTIN 400 MG/1
300 CAPSULE ORAL THREE TIMES A DAY
Refills: 0 | Status: DISCONTINUED | OUTPATIENT
Start: 2020-01-01 | End: 2020-01-01

## 2020-01-01 RX ORDER — HYDROMORPHONE HYDROCHLORIDE 2 MG/ML
0.25 INJECTION INTRAMUSCULAR; INTRAVENOUS; SUBCUTANEOUS
Refills: 0 | Status: DISCONTINUED | OUTPATIENT
Start: 2020-01-01 | End: 2020-01-01

## 2020-01-01 RX ORDER — LOPERAMIDE HCL 2 MG
2 TABLET ORAL EVERY 4 HOURS
Refills: 0 | Status: DISCONTINUED | OUTPATIENT
Start: 2020-01-01 | End: 2020-01-01

## 2020-01-01 RX ORDER — ISOSORBIDE DINITRATE 5 MG/1
1 TABLET ORAL
Qty: 90 | Refills: 0
Start: 2020-01-01 | End: 2020-01-01

## 2020-01-01 RX ORDER — METFORMIN HYDROCHLORIDE 1000 MG/1
1000 TABLET, COATED ORAL
Qty: 60 | Refills: 0 | Status: COMPLETED | COMMUNITY
Start: 2019-08-15 | End: 2020-01-01

## 2020-01-01 RX ORDER — METOPROLOL TARTRATE 50 MG
50 TABLET ORAL DAILY
Refills: 0 | Status: DISCONTINUED | OUTPATIENT
Start: 2020-01-01 | End: 2020-01-01

## 2020-01-01 RX ORDER — HEPARIN SODIUM 5000 [USP'U]/ML
750 INJECTION INTRAVENOUS; SUBCUTANEOUS
Qty: 25000 | Refills: 0 | Status: DISCONTINUED | OUTPATIENT
Start: 2020-01-01 | End: 2020-01-01

## 2020-01-01 RX ORDER — FUROSEMIDE 80 MG/1
TABLET ORAL
Refills: 0 | Status: DISCONTINUED | COMMUNITY
End: 2020-01-01

## 2020-01-01 RX ORDER — LIDOCAINE 4 G/100G
1 CREAM TOPICAL EVERY 24 HOURS
Refills: 0 | Status: DISCONTINUED | OUTPATIENT
Start: 2020-01-01 | End: 2020-01-01

## 2020-01-01 RX ORDER — LOPERAMIDE HCL 2 MG
1 TABLET ORAL
Qty: 30 | Refills: 0
Start: 2020-01-01 | End: 2020-01-01

## 2020-01-01 RX ORDER — INSULIN LISPRO 100/ML
VIAL (ML) SUBCUTANEOUS
Refills: 0 | Status: DISCONTINUED | OUTPATIENT
Start: 2020-01-01 | End: 2020-01-01

## 2020-01-01 RX ORDER — VASOPRESSIN 20 [USP'U]/ML
0.1 INJECTION INTRAVENOUS
Qty: 50 | Refills: 0 | Status: DISCONTINUED | OUTPATIENT
Start: 2020-01-01 | End: 2020-01-01

## 2020-01-01 RX ORDER — INSULIN LISPRO 100/ML
2 VIAL (ML) SUBCUTANEOUS
Qty: 3 | Refills: 0
Start: 2020-01-01

## 2020-01-01 RX ORDER — ACETAMINOPHEN 500 MG
2 TABLET ORAL
Qty: 0 | Refills: 0 | DISCHARGE
Start: 2020-01-01

## 2020-01-01 RX ORDER — AMIODARONE HYDROCHLORIDE 400 MG/1
400 TABLET ORAL EVERY 12 HOURS
Refills: 0 | Status: COMPLETED | OUTPATIENT
Start: 2020-01-01 | End: 2020-01-01

## 2020-01-01 RX ORDER — METOPROLOL SUCCINATE 25 MG/1
25 TABLET, EXTENDED RELEASE ORAL
Qty: 180 | Refills: 3 | Status: ACTIVE | COMMUNITY
Start: 2020-01-01 | End: 1900-01-01

## 2020-01-01 RX ORDER — INSULIN LISPRO 100/ML
2 VIAL (ML) SUBCUTANEOUS
Qty: 10 | Refills: 0
Start: 2020-01-01 | End: 2020-01-01

## 2020-01-01 RX ORDER — INSULIN GLARGINE 100 [IU]/ML
9 INJECTION, SOLUTION SUBCUTANEOUS AT BEDTIME
Refills: 0 | Status: DISCONTINUED | OUTPATIENT
Start: 2020-01-01 | End: 2020-01-01

## 2020-01-01 RX ORDER — TIOTROPIUM BROMIDE 18 UG/1
18 CAPSULE ORAL; RESPIRATORY (INHALATION) DAILY
Refills: 0 | Status: COMPLETED | COMMUNITY
Start: 2019-11-24 | End: 2020-01-01

## 2020-01-01 RX ORDER — CEFTRIAXONE 500 MG/1
1000 INJECTION, POWDER, FOR SOLUTION INTRAMUSCULAR; INTRAVENOUS ONCE
Refills: 0 | Status: COMPLETED | OUTPATIENT
Start: 2020-01-01 | End: 2020-01-01

## 2020-01-01 RX ORDER — ERYTHROPOIETIN 10000 [IU]/ML
10000 INJECTION, SOLUTION INTRAVENOUS; SUBCUTANEOUS
Refills: 0 | Status: DISCONTINUED | OUTPATIENT
Start: 2020-01-01 | End: 2020-01-01

## 2020-01-01 RX ORDER — ACETAMINOPHEN 325 MG/1
325 TABLET ORAL
Refills: 0 | Status: COMPLETED | COMMUNITY
Start: 2019-11-24 | End: 2020-01-01

## 2020-01-01 RX ORDER — ONDANSETRON 8 MG/1
4 TABLET, FILM COATED ORAL ONCE
Refills: 0 | Status: COMPLETED | OUTPATIENT
Start: 2020-01-01 | End: 2020-01-01

## 2020-01-01 RX ORDER — ONDANSETRON 8 MG/1
4 TABLET, FILM COATED ORAL EVERY 6 HOURS
Refills: 0 | Status: DISCONTINUED | OUTPATIENT
Start: 2020-01-01 | End: 2020-01-01

## 2020-01-01 RX ORDER — CEFTRIAXONE 500 MG/1
1000 INJECTION, POWDER, FOR SOLUTION INTRAMUSCULAR; INTRAVENOUS EVERY 24 HOURS
Refills: 0 | Status: DISCONTINUED | OUTPATIENT
Start: 2020-01-01 | End: 2020-01-01

## 2020-01-01 RX ORDER — DEXTROSE 50 % IN WATER 50 %
12.5 SYRINGE (ML) INTRAVENOUS ONCE
Refills: 0 | Status: DISCONTINUED | OUTPATIENT
Start: 2020-01-01 | End: 2020-01-01

## 2020-01-01 RX ORDER — ADENOSINE 3 MG/ML
6 INJECTION INTRAVENOUS ONCE
Refills: 0 | Status: COMPLETED | OUTPATIENT
Start: 2020-01-01 | End: 2020-01-01

## 2020-01-01 RX ORDER — CHOLECALCIFEROL (VITAMIN D3) 125 MCG
400 CAPSULE ORAL DAILY
Refills: 0 | Status: DISCONTINUED | OUTPATIENT
Start: 2020-01-01 | End: 2020-01-01

## 2020-01-01 RX ADMIN — AMIODARONE HYDROCHLORIDE 400 MILLIGRAM(S): 400 TABLET ORAL at 21:09

## 2020-01-01 RX ADMIN — AMIODARONE HYDROCHLORIDE 400 MILLIGRAM(S): 400 TABLET ORAL at 06:15

## 2020-01-01 RX ADMIN — ERYTHROPOIETIN 10000 UNIT(S): 10000 INJECTION, SOLUTION INTRAVENOUS; SUBCUTANEOUS at 09:53

## 2020-01-01 RX ADMIN — NAFCILLIN 200 GRAM(S): 10 INJECTION, POWDER, FOR SOLUTION INTRAVENOUS at 17:15

## 2020-01-01 RX ADMIN — Medication 25 MILLIGRAM(S): at 13:54

## 2020-01-01 RX ADMIN — Medication 1 TABLET(S): at 06:15

## 2020-01-01 RX ADMIN — PANTOPRAZOLE SODIUM 40 MILLIGRAM(S): 20 TABLET, DELAYED RELEASE ORAL at 05:33

## 2020-01-01 RX ADMIN — Medication 1 TABLET(S): at 21:28

## 2020-01-01 RX ADMIN — Medication 500 MILLIGRAM(S): at 11:26

## 2020-01-01 RX ADMIN — Medication 0.25 MILLIGRAM(S): at 22:45

## 2020-01-01 RX ADMIN — SODIUM CHLORIDE 50 MILLILITER(S): 9 INJECTION INTRAMUSCULAR; INTRAVENOUS; SUBCUTANEOUS at 12:32

## 2020-01-01 RX ADMIN — ISOSORBIDE DINITRATE 10 MILLIGRAM(S): 5 TABLET ORAL at 05:15

## 2020-01-01 RX ADMIN — AMIODARONE HYDROCHLORIDE 200 MILLIGRAM(S): 400 TABLET ORAL at 12:01

## 2020-01-01 RX ADMIN — Medication 650 MILLIGRAM(S): at 23:09

## 2020-01-01 RX ADMIN — Medication 1 TABLET(S): at 13:36

## 2020-01-01 RX ADMIN — HEPARIN SODIUM 5000 UNIT(S): 5000 INJECTION INTRAVENOUS; SUBCUTANEOUS at 13:41

## 2020-01-01 RX ADMIN — Medication 25 MILLIGRAM(S): at 18:24

## 2020-01-01 RX ADMIN — SERTRALINE 25 MILLIGRAM(S): 25 TABLET, FILM COATED ORAL at 11:57

## 2020-01-01 RX ADMIN — NAFCILLIN 200 GRAM(S): 10 INJECTION, POWDER, FOR SOLUTION INTRAVENOUS at 05:15

## 2020-01-01 RX ADMIN — Medication 1 TABLET(S): at 21:51

## 2020-01-01 RX ADMIN — Medication 1 TABLET(S): at 14:57

## 2020-01-01 RX ADMIN — Medication 650 MILLIGRAM(S): at 12:56

## 2020-01-01 RX ADMIN — Medication 1 TABLET(S): at 06:10

## 2020-01-01 RX ADMIN — Medication 650 MILLIGRAM(S): at 21:04

## 2020-01-01 RX ADMIN — Medication 1 TABLET(S): at 22:03

## 2020-01-01 RX ADMIN — Medication 25 MILLIGRAM(S): at 12:44

## 2020-01-01 RX ADMIN — NAFCILLIN 200 GRAM(S): 10 INJECTION, POWDER, FOR SOLUTION INTRAVENOUS at 23:10

## 2020-01-01 RX ADMIN — HEPARIN SODIUM 5000 UNIT(S): 5000 INJECTION INTRAVENOUS; SUBCUTANEOUS at 22:28

## 2020-01-01 RX ADMIN — ISOSORBIDE DINITRATE 10 MILLIGRAM(S): 5 TABLET ORAL at 05:08

## 2020-01-01 RX ADMIN — Medication 650 MILLIGRAM(S): at 11:30

## 2020-01-01 RX ADMIN — Medication 500 MILLIGRAM(S): at 12:03

## 2020-01-01 RX ADMIN — Medication 81 MILLIGRAM(S): at 12:43

## 2020-01-01 RX ADMIN — CHLORHEXIDINE GLUCONATE 1 APPLICATION(S): 213 SOLUTION TOPICAL at 06:39

## 2020-01-01 RX ADMIN — INSULIN GLARGINE 15 UNIT(S): 100 INJECTION, SOLUTION SUBCUTANEOUS at 08:39

## 2020-01-01 RX ADMIN — AMIODARONE HYDROCHLORIDE 400 MILLIGRAM(S): 400 TABLET ORAL at 22:20

## 2020-01-01 RX ADMIN — Medication 3 UNIT(S): at 07:51

## 2020-01-01 RX ADMIN — ISOSORBIDE DINITRATE 10 MILLIGRAM(S): 5 TABLET ORAL at 21:56

## 2020-01-01 RX ADMIN — AMIODARONE HYDROCHLORIDE 400 MILLIGRAM(S): 400 TABLET ORAL at 05:16

## 2020-01-01 RX ADMIN — Medication 50 MILLIGRAM(S): at 17:31

## 2020-01-01 RX ADMIN — SERTRALINE 25 MILLIGRAM(S): 25 TABLET, FILM COATED ORAL at 11:35

## 2020-01-01 RX ADMIN — Medication 20 MILLIGRAM(S): at 17:02

## 2020-01-01 RX ADMIN — Medication 1 TABLET(S): at 12:43

## 2020-01-01 RX ADMIN — SERTRALINE 25 MILLIGRAM(S): 25 TABLET, FILM COATED ORAL at 11:28

## 2020-01-01 RX ADMIN — Medication 1 TABLET(S): at 05:08

## 2020-01-01 RX ADMIN — SERTRALINE 25 MILLIGRAM(S): 25 TABLET, FILM COATED ORAL at 11:16

## 2020-01-01 RX ADMIN — Medication 1 TABLET(S): at 22:12

## 2020-01-01 RX ADMIN — INSULIN GLARGINE 9 UNIT(S): 100 INJECTION, SOLUTION SUBCUTANEOUS at 21:51

## 2020-01-01 RX ADMIN — Medication 25 MILLIGRAM(S): at 12:43

## 2020-01-01 RX ADMIN — Medication 25 MILLIGRAM(S): at 09:30

## 2020-01-01 RX ADMIN — TIOTROPIUM BROMIDE 1 CAPSULE(S): 18 CAPSULE ORAL; RESPIRATORY (INHALATION) at 08:56

## 2020-01-01 RX ADMIN — Medication 81 MILLIGRAM(S): at 11:52

## 2020-01-01 RX ADMIN — Medication 25 MILLIGRAM(S): at 05:56

## 2020-01-01 RX ADMIN — CHLORHEXIDINE GLUCONATE 1 APPLICATION(S): 213 SOLUTION TOPICAL at 05:56

## 2020-01-01 RX ADMIN — Medication 25 MILLIGRAM(S): at 21:51

## 2020-01-01 RX ADMIN — INSULIN GLARGINE 15 UNIT(S): 100 INJECTION, SOLUTION SUBCUTANEOUS at 11:33

## 2020-01-01 RX ADMIN — INSULIN GLARGINE 15 UNIT(S): 100 INJECTION, SOLUTION SUBCUTANEOUS at 08:03

## 2020-01-01 RX ADMIN — Medication 1 TABLET(S): at 21:55

## 2020-01-01 RX ADMIN — SERTRALINE 25 MILLIGRAM(S): 25 TABLET, FILM COATED ORAL at 11:52

## 2020-01-01 RX ADMIN — Medication 1 TABLET(S): at 06:40

## 2020-01-01 RX ADMIN — HEPARIN SODIUM 11 UNIT(S)/HR: 5000 INJECTION INTRAVENOUS; SUBCUTANEOUS at 21:04

## 2020-01-01 RX ADMIN — Medication 50 MILLIGRAM(S): at 06:17

## 2020-01-01 RX ADMIN — Medication 650 MILLIGRAM(S): at 22:46

## 2020-01-01 RX ADMIN — HEPARIN SODIUM 5000 UNIT(S): 5000 INJECTION INTRAVENOUS; SUBCUTANEOUS at 05:15

## 2020-01-01 RX ADMIN — SERTRALINE 25 MILLIGRAM(S): 25 TABLET, FILM COATED ORAL at 15:57

## 2020-01-01 RX ADMIN — Medication 12.5 MILLIGRAM(S): at 23:16

## 2020-01-01 RX ADMIN — PANTOPRAZOLE SODIUM 40 MILLIGRAM(S): 20 TABLET, DELAYED RELEASE ORAL at 08:39

## 2020-01-01 RX ADMIN — INSULIN GLARGINE 9 UNIT(S): 100 INJECTION, SOLUTION SUBCUTANEOUS at 21:54

## 2020-01-01 RX ADMIN — ZOLPIDEM TARTRATE 5 MILLIGRAM(S): 10 TABLET ORAL at 21:50

## 2020-01-01 RX ADMIN — Medication 20 MILLIGRAM(S): at 06:17

## 2020-01-01 RX ADMIN — Medication 25 MILLIGRAM(S): at 13:31

## 2020-01-01 RX ADMIN — Medication 5 UNIT(S): at 17:44

## 2020-01-01 RX ADMIN — ATORVASTATIN CALCIUM 40 MILLIGRAM(S): 80 TABLET, FILM COATED ORAL at 22:00

## 2020-01-01 RX ADMIN — Medication 12.5 MILLIGRAM(S): at 05:33

## 2020-01-01 RX ADMIN — Medication 1000 UNIT(S): at 11:55

## 2020-01-01 RX ADMIN — HEPARIN SODIUM 7.5 UNIT(S)/HR: 5000 INJECTION INTRAVENOUS; SUBCUTANEOUS at 15:24

## 2020-01-01 RX ADMIN — Medication 1 TABLET(S): at 05:21

## 2020-01-01 RX ADMIN — NAFCILLIN 200 GRAM(S): 10 INJECTION, POWDER, FOR SOLUTION INTRAVENOUS at 09:07

## 2020-01-01 RX ADMIN — NAFCILLIN 200 GRAM(S): 10 INJECTION, POWDER, FOR SOLUTION INTRAVENOUS at 13:17

## 2020-01-01 RX ADMIN — Medication 650 MILLIGRAM(S): at 17:18

## 2020-01-01 RX ADMIN — INSULIN GLARGINE 5 UNIT(S): 100 INJECTION, SOLUTION SUBCUTANEOUS at 22:09

## 2020-01-01 RX ADMIN — Medication 1000 UNIT(S): at 17:24

## 2020-01-01 RX ADMIN — INSULIN GLARGINE 9 UNIT(S): 100 INJECTION, SOLUTION SUBCUTANEOUS at 22:01

## 2020-01-01 RX ADMIN — Medication 81 MILLIGRAM(S): at 11:16

## 2020-01-01 RX ADMIN — Medication 25 MILLIGRAM(S): at 17:18

## 2020-01-01 RX ADMIN — PANTOPRAZOLE SODIUM 40 MILLIGRAM(S): 20 TABLET, DELAYED RELEASE ORAL at 05:20

## 2020-01-01 RX ADMIN — CHLORHEXIDINE GLUCONATE 1 APPLICATION(S): 213 SOLUTION TOPICAL at 06:17

## 2020-01-01 RX ADMIN — Medication 25 MILLIGRAM(S): at 05:05

## 2020-01-01 RX ADMIN — NAFCILLIN 200 GRAM(S): 10 INJECTION, POWDER, FOR SOLUTION INTRAVENOUS at 18:09

## 2020-01-01 RX ADMIN — Medication 136 MILLIGRAM(S): at 09:53

## 2020-01-01 RX ADMIN — Medication 1 TABLET(S): at 22:21

## 2020-01-01 RX ADMIN — NAFCILLIN 200 GRAM(S): 10 INJECTION, POWDER, FOR SOLUTION INTRAVENOUS at 05:22

## 2020-01-01 RX ADMIN — Medication 25 MILLIGRAM(S): at 05:33

## 2020-01-01 RX ADMIN — Medication 1 TABLET(S): at 13:18

## 2020-01-01 RX ADMIN — Medication 1 TABLET(S): at 22:00

## 2020-01-01 RX ADMIN — ISOSORBIDE DINITRATE 10 MILLIGRAM(S): 5 TABLET ORAL at 22:27

## 2020-01-01 RX ADMIN — Medication 3 MILLIGRAM(S): at 21:52

## 2020-01-01 RX ADMIN — HEPARIN SODIUM 5 UNIT(S)/HR: 5000 INJECTION INTRAVENOUS; SUBCUTANEOUS at 11:34

## 2020-01-01 RX ADMIN — ISOSORBIDE DINITRATE 10 MILLIGRAM(S): 5 TABLET ORAL at 13:31

## 2020-01-01 RX ADMIN — ATORVASTATIN CALCIUM 40 MILLIGRAM(S): 80 TABLET, FILM COATED ORAL at 21:50

## 2020-01-01 RX ADMIN — Medication 3 MILLIGRAM(S): at 21:50

## 2020-01-01 RX ADMIN — Medication 25 MILLIGRAM(S): at 21:52

## 2020-01-01 RX ADMIN — Medication 650 MILLIGRAM(S): at 18:09

## 2020-01-01 RX ADMIN — Medication 650 MILLIGRAM(S): at 17:16

## 2020-01-01 RX ADMIN — Medication 81 MILLIGRAM(S): at 11:54

## 2020-01-01 RX ADMIN — Medication 1 TABLET(S): at 16:50

## 2020-01-01 RX ADMIN — VASOPRESSIN 2.4 UNIT(S)/MIN: 20 INJECTION INTRAVENOUS at 19:47

## 2020-01-01 RX ADMIN — Medication 12.5 MILLIGRAM(S): at 11:55

## 2020-01-01 RX ADMIN — Medication 1 TABLET(S): at 05:26

## 2020-01-01 RX ADMIN — GABAPENTIN 300 MILLIGRAM(S): 400 CAPSULE ORAL at 05:55

## 2020-01-01 RX ADMIN — INSULIN GLARGINE 15 UNIT(S): 100 INJECTION, SOLUTION SUBCUTANEOUS at 08:40

## 2020-01-01 RX ADMIN — Medication 25 MILLIGRAM(S): at 06:17

## 2020-01-01 RX ADMIN — Medication 1 TABLET(S): at 22:09

## 2020-01-01 RX ADMIN — Medication 12.5 MILLIGRAM(S): at 05:27

## 2020-01-01 RX ADMIN — AMIODARONE HYDROCHLORIDE 200 MILLIGRAM(S): 400 TABLET ORAL at 05:33

## 2020-01-01 RX ADMIN — LINEZOLID 600 MILLIGRAM(S): 600 INJECTION, SOLUTION INTRAVENOUS at 09:13

## 2020-01-01 RX ADMIN — Medication 20 MILLIGRAM(S): at 17:24

## 2020-01-01 RX ADMIN — TIOTROPIUM BROMIDE 1 CAPSULE(S): 18 CAPSULE ORAL; RESPIRATORY (INHALATION) at 09:13

## 2020-01-01 RX ADMIN — ATORVASTATIN CALCIUM 40 MILLIGRAM(S): 80 TABLET, FILM COATED ORAL at 21:55

## 2020-01-01 RX ADMIN — Medication 1 TABLET(S): at 21:04

## 2020-01-01 RX ADMIN — SERTRALINE 25 MILLIGRAM(S): 25 TABLET, FILM COATED ORAL at 12:15

## 2020-01-01 RX ADMIN — Medication 3 MILLIGRAM(S): at 21:28

## 2020-01-01 RX ADMIN — Medication 5 UNIT(S): at 08:09

## 2020-01-01 RX ADMIN — Medication 25 MILLIGRAM(S): at 13:18

## 2020-01-01 RX ADMIN — Medication 25 MILLIGRAM(S): at 21:57

## 2020-01-01 RX ADMIN — Medication 81 MILLIGRAM(S): at 11:57

## 2020-01-01 RX ADMIN — Medication 500 MILLIGRAM(S): at 11:17

## 2020-01-01 RX ADMIN — ISOSORBIDE DINITRATE 10 MILLIGRAM(S): 5 TABLET ORAL at 21:52

## 2020-01-01 RX ADMIN — TIOTROPIUM BROMIDE 1 CAPSULE(S): 18 CAPSULE ORAL; RESPIRATORY (INHALATION) at 08:42

## 2020-01-01 RX ADMIN — TIOTROPIUM BROMIDE 1 CAPSULE(S): 18 CAPSULE ORAL; RESPIRATORY (INHALATION) at 09:02

## 2020-01-01 RX ADMIN — VASOPRESSIN 2.4 UNIT(S)/MIN: 20 INJECTION INTRAVENOUS at 11:34

## 2020-01-01 RX ADMIN — Medication 81 MILLIGRAM(S): at 12:03

## 2020-01-01 RX ADMIN — Medication 650 MILLIGRAM(S): at 05:20

## 2020-01-01 RX ADMIN — ATORVASTATIN CALCIUM 40 MILLIGRAM(S): 80 TABLET, FILM COATED ORAL at 22:03

## 2020-01-01 RX ADMIN — ATORVASTATIN CALCIUM 40 MILLIGRAM(S): 80 TABLET, FILM COATED ORAL at 21:04

## 2020-01-01 RX ADMIN — Medication 500 MILLIGRAM(S): at 12:43

## 2020-01-01 RX ADMIN — PANTOPRAZOLE SODIUM 40 MILLIGRAM(S): 20 TABLET, DELAYED RELEASE ORAL at 06:40

## 2020-01-01 RX ADMIN — HEPARIN SODIUM 6 UNIT(S)/HR: 5000 INJECTION INTRAVENOUS; SUBCUTANEOUS at 08:41

## 2020-01-01 RX ADMIN — SERTRALINE 25 MILLIGRAM(S): 25 TABLET, FILM COATED ORAL at 11:25

## 2020-01-01 RX ADMIN — Medication 1 TABLET(S): at 06:20

## 2020-01-01 RX ADMIN — INSULIN GLARGINE 15 UNIT(S): 100 INJECTION, SOLUTION SUBCUTANEOUS at 09:11

## 2020-01-01 RX ADMIN — NAFCILLIN 200 GRAM(S): 10 INJECTION, POWDER, FOR SOLUTION INTRAVENOUS at 02:03

## 2020-01-01 RX ADMIN — PANTOPRAZOLE SODIUM 40 MILLIGRAM(S): 20 TABLET, DELAYED RELEASE ORAL at 06:18

## 2020-01-01 RX ADMIN — NAFCILLIN 200 GRAM(S): 10 INJECTION, POWDER, FOR SOLUTION INTRAVENOUS at 06:23

## 2020-01-01 RX ADMIN — LIDOCAINE 1 PATCH: 4 CREAM TOPICAL at 12:00

## 2020-01-01 RX ADMIN — AMIODARONE HYDROCHLORIDE 400 MILLIGRAM(S): 400 TABLET ORAL at 06:20

## 2020-01-01 RX ADMIN — TIOTROPIUM BROMIDE 1 CAPSULE(S): 18 CAPSULE ORAL; RESPIRATORY (INHALATION) at 07:52

## 2020-01-01 RX ADMIN — INSULIN GLARGINE 5 UNIT(S): 100 INJECTION, SOLUTION SUBCUTANEOUS at 22:25

## 2020-01-01 RX ADMIN — NAFCILLIN 200 GRAM(S): 10 INJECTION, POWDER, FOR SOLUTION INTRAVENOUS at 22:45

## 2020-01-01 RX ADMIN — CHLORHEXIDINE GLUCONATE 1 APPLICATION(S): 213 SOLUTION TOPICAL at 05:21

## 2020-01-01 RX ADMIN — TIOTROPIUM BROMIDE 1 CAPSULE(S): 18 CAPSULE ORAL; RESPIRATORY (INHALATION) at 08:51

## 2020-01-01 RX ADMIN — SERTRALINE 25 MILLIGRAM(S): 25 TABLET, FILM COATED ORAL at 12:03

## 2020-01-01 RX ADMIN — Medication 650 MILLIGRAM(S): at 17:11

## 2020-01-01 RX ADMIN — ATORVASTATIN CALCIUM 40 MILLIGRAM(S): 80 TABLET, FILM COATED ORAL at 23:09

## 2020-01-01 RX ADMIN — ATORVASTATIN CALCIUM 40 MILLIGRAM(S): 80 TABLET, FILM COATED ORAL at 22:20

## 2020-01-01 RX ADMIN — INSULIN GLARGINE 12 UNIT(S): 100 INJECTION, SOLUTION SUBCUTANEOUS at 07:48

## 2020-01-01 RX ADMIN — SENNA PLUS 2 TABLET(S): 8.6 TABLET ORAL at 22:20

## 2020-01-01 RX ADMIN — Medication 1 TABLET(S): at 14:43

## 2020-01-01 RX ADMIN — NAFCILLIN 200 GRAM(S): 10 INJECTION, POWDER, FOR SOLUTION INTRAVENOUS at 19:00

## 2020-01-01 RX ADMIN — Medication 81 MILLIGRAM(S): at 12:56

## 2020-01-01 RX ADMIN — WARFARIN SODIUM 5 MILLIGRAM(S): 2.5 TABLET ORAL at 21:50

## 2020-01-01 RX ADMIN — Medication 81 MILLIGRAM(S): at 12:15

## 2020-01-01 RX ADMIN — Medication 12.5 MILLIGRAM(S): at 16:17

## 2020-01-01 RX ADMIN — ATORVASTATIN CALCIUM 40 MILLIGRAM(S): 80 TABLET, FILM COATED ORAL at 21:09

## 2020-01-01 RX ADMIN — AMIODARONE HYDROCHLORIDE 600 MILLIGRAM(S): 400 TABLET ORAL at 12:41

## 2020-01-01 RX ADMIN — Medication 650 MILLIGRAM(S): at 21:05

## 2020-01-01 RX ADMIN — ISOSORBIDE DINITRATE 10 MILLIGRAM(S): 5 TABLET ORAL at 22:09

## 2020-01-01 RX ADMIN — AMIODARONE HYDROCHLORIDE 200 MILLIGRAM(S): 400 TABLET ORAL at 05:05

## 2020-01-01 RX ADMIN — PANTOPRAZOLE SODIUM 40 MILLIGRAM(S): 20 TABLET, DELAYED RELEASE ORAL at 06:10

## 2020-01-01 RX ADMIN — Medication 650 MILLIGRAM(S): at 06:09

## 2020-01-01 RX ADMIN — CHLORHEXIDINE GLUCONATE 1 APPLICATION(S): 213 SOLUTION TOPICAL at 06:12

## 2020-01-01 RX ADMIN — Medication 1 TABLET(S): at 05:33

## 2020-01-01 RX ADMIN — Medication 1 TABLET(S): at 23:09

## 2020-01-01 RX ADMIN — Medication 650 MILLIGRAM(S): at 05:26

## 2020-01-01 RX ADMIN — Medication 1 TABLET(S): at 21:09

## 2020-01-01 RX ADMIN — PANTOPRAZOLE SODIUM 40 MILLIGRAM(S): 20 TABLET, DELAYED RELEASE ORAL at 06:15

## 2020-01-01 RX ADMIN — SERTRALINE 25 MILLIGRAM(S): 25 TABLET, FILM COATED ORAL at 11:34

## 2020-01-01 RX ADMIN — Medication 500 MILLIGRAM(S): at 11:34

## 2020-01-01 RX ADMIN — Medication 81 MILLIGRAM(S): at 11:51

## 2020-01-01 RX ADMIN — SERTRALINE 25 MILLIGRAM(S): 25 TABLET, FILM COATED ORAL at 12:43

## 2020-01-01 RX ADMIN — Medication 1 TABLET(S): at 05:51

## 2020-01-01 RX ADMIN — Medication 650 MILLIGRAM(S): at 06:40

## 2020-01-01 RX ADMIN — ATORVASTATIN CALCIUM 40 MILLIGRAM(S): 80 TABLET, FILM COATED ORAL at 21:57

## 2020-01-01 RX ADMIN — HEPARIN SODIUM 6 UNIT(S)/HR: 5000 INJECTION INTRAVENOUS; SUBCUTANEOUS at 07:51

## 2020-01-01 RX ADMIN — Medication 5 UNIT(S): at 17:03

## 2020-01-01 RX ADMIN — Medication 650 MILLIGRAM(S): at 08:41

## 2020-01-01 RX ADMIN — Medication 50 MILLIGRAM(S): at 18:44

## 2020-01-01 RX ADMIN — LIDOCAINE 1 PATCH: 4 CREAM TOPICAL at 22:04

## 2020-01-01 RX ADMIN — Medication 81 MILLIGRAM(S): at 11:34

## 2020-01-01 RX ADMIN — Medication 5 UNIT(S): at 09:12

## 2020-01-01 RX ADMIN — Medication 25 MILLIGRAM(S): at 05:15

## 2020-01-01 RX ADMIN — Medication 650 MILLIGRAM(S): at 01:00

## 2020-01-01 RX ADMIN — AMIODARONE HYDROCHLORIDE 200 MILLIGRAM(S): 400 TABLET ORAL at 05:08

## 2020-01-01 RX ADMIN — AMIODARONE HYDROCHLORIDE 33.3 MG/MIN: 400 TABLET ORAL at 13:27

## 2020-01-01 RX ADMIN — Medication 20 MILLIGRAM(S): at 18:44

## 2020-01-01 RX ADMIN — Medication 81 MILLIGRAM(S): at 15:57

## 2020-01-01 RX ADMIN — Medication 81 MILLIGRAM(S): at 11:25

## 2020-01-01 RX ADMIN — Medication 650 MILLIGRAM(S): at 05:15

## 2020-01-01 RX ADMIN — ATORVASTATIN CALCIUM 40 MILLIGRAM(S): 80 TABLET, FILM COATED ORAL at 21:15

## 2020-01-01 RX ADMIN — AMIODARONE HYDROCHLORIDE 400 MILLIGRAM(S): 400 TABLET ORAL at 18:27

## 2020-01-01 RX ADMIN — Medication 500 MILLIGRAM(S): at 15:56

## 2020-01-01 RX ADMIN — ERYTHROPOIETIN 10000 UNIT(S): 10000 INJECTION, SOLUTION INTRAVENOUS; SUBCUTANEOUS at 11:17

## 2020-01-01 RX ADMIN — Medication 12.5 MILLIGRAM(S): at 17:05

## 2020-01-01 RX ADMIN — ATORVASTATIN CALCIUM 40 MILLIGRAM(S): 80 TABLET, FILM COATED ORAL at 21:52

## 2020-01-01 RX ADMIN — ISOSORBIDE DINITRATE 10 MILLIGRAM(S): 5 TABLET ORAL at 13:41

## 2020-01-01 RX ADMIN — Medication 5 UNIT(S): at 11:34

## 2020-01-01 RX ADMIN — Medication 20 MILLIGRAM(S): at 05:09

## 2020-01-01 RX ADMIN — Medication 500 MILLIGRAM(S): at 11:54

## 2020-01-01 RX ADMIN — NAFCILLIN 200 GRAM(S): 10 INJECTION, POWDER, FOR SOLUTION INTRAVENOUS at 13:16

## 2020-01-01 RX ADMIN — Medication 25 MILLIGRAM(S): at 21:28

## 2020-01-01 RX ADMIN — Medication 30 MILLILITER(S): at 15:25

## 2020-01-01 RX ADMIN — ATORVASTATIN CALCIUM 40 MILLIGRAM(S): 80 TABLET, FILM COATED ORAL at 21:28

## 2020-01-01 RX ADMIN — VASOPRESSIN 2.4 UNIT(S)/MIN: 20 INJECTION INTRAVENOUS at 08:40

## 2020-01-01 RX ADMIN — Medication 12.5 MILLIGRAM(S): at 00:28

## 2020-01-01 RX ADMIN — Medication 50 MILLIEQUIVALENT(S): at 08:17

## 2020-01-01 RX ADMIN — AMIODARONE HYDROCHLORIDE 400 MILLIGRAM(S): 400 TABLET ORAL at 18:24

## 2020-01-01 RX ADMIN — LIDOCAINE 1 PATCH: 4 CREAM TOPICAL at 23:44

## 2020-01-01 RX ADMIN — Medication 12.5 MILLIGRAM(S): at 06:19

## 2020-01-01 RX ADMIN — Medication 500 MILLIGRAM(S): at 11:57

## 2020-01-01 RX ADMIN — Medication 650 MILLIGRAM(S): at 11:57

## 2020-01-01 RX ADMIN — METHOCARBAMOL 500 MILLIGRAM(S): 500 TABLET, FILM COATED ORAL at 13:05

## 2020-01-01 RX ADMIN — Medication 12.5 MILLIGRAM(S): at 17:11

## 2020-01-01 RX ADMIN — Medication 1 TABLET(S): at 05:56

## 2020-01-01 RX ADMIN — INSULIN GLARGINE 5 UNIT(S): 100 INJECTION, SOLUTION SUBCUTANEOUS at 21:57

## 2020-01-01 RX ADMIN — PANTOPRAZOLE SODIUM 40 MILLIGRAM(S): 20 TABLET, DELAYED RELEASE ORAL at 05:51

## 2020-01-01 RX ADMIN — Medication 20 MILLIGRAM(S): at 12:42

## 2020-01-01 RX ADMIN — Medication 500 MILLIGRAM(S): at 11:35

## 2020-01-01 RX ADMIN — Medication 500 MILLIGRAM(S): at 10:53

## 2020-01-01 RX ADMIN — Medication 1 TABLET(S): at 21:15

## 2020-01-01 RX ADMIN — PANTOPRAZOLE SODIUM 40 MILLIGRAM(S): 20 TABLET, DELAYED RELEASE ORAL at 06:19

## 2020-01-01 RX ADMIN — AMIODARONE HYDROCHLORIDE 200 MILLIGRAM(S): 400 TABLET ORAL at 06:17

## 2020-01-01 RX ADMIN — IRON SUCROSE 68.75 MILLIGRAM(S): 20 INJECTION, SOLUTION INTRAVENOUS at 12:42

## 2020-01-01 RX ADMIN — Medication 81 MILLIGRAM(S): at 10:53

## 2020-01-01 RX ADMIN — ATORVASTATIN CALCIUM 40 MILLIGRAM(S): 80 TABLET, FILM COATED ORAL at 22:28

## 2020-01-01 RX ADMIN — Medication 500 MILLIGRAM(S): at 12:15

## 2020-01-01 RX ADMIN — Medication 25 MILLIGRAM(S): at 05:47

## 2020-01-01 RX ADMIN — CHLORHEXIDINE GLUCONATE 1 APPLICATION(S): 213 SOLUTION TOPICAL at 05:51

## 2020-01-01 RX ADMIN — Medication 500 MILLIGRAM(S): at 11:52

## 2020-01-01 RX ADMIN — PANTOPRAZOLE SODIUM 40 MILLIGRAM(S): 20 TABLET, DELAYED RELEASE ORAL at 05:15

## 2020-01-01 RX ADMIN — AMIODARONE HYDROCHLORIDE 200 MILLIGRAM(S): 400 TABLET ORAL at 05:51

## 2020-01-01 RX ADMIN — AMIODARONE HYDROCHLORIDE 400 MILLIGRAM(S): 400 TABLET ORAL at 13:04

## 2020-01-01 RX ADMIN — Medication 650 MILLIGRAM(S): at 11:51

## 2020-01-01 RX ADMIN — Medication 0.25 MILLIGRAM(S): at 00:53

## 2020-01-01 RX ADMIN — PANTOPRAZOLE SODIUM 40 MILLIGRAM(S): 20 TABLET, DELAYED RELEASE ORAL at 05:08

## 2020-01-01 RX ADMIN — ATORVASTATIN CALCIUM 40 MILLIGRAM(S): 80 TABLET, FILM COATED ORAL at 21:51

## 2020-01-01 RX ADMIN — NAFCILLIN 200 GRAM(S): 10 INJECTION, POWDER, FOR SOLUTION INTRAVENOUS at 22:21

## 2020-01-01 RX ADMIN — Medication 650 MILLIGRAM(S): at 06:19

## 2020-01-01 RX ADMIN — METHOCARBAMOL 500 MILLIGRAM(S): 500 TABLET, FILM COATED ORAL at 05:54

## 2020-01-01 RX ADMIN — Medication 5 UNIT(S): at 17:16

## 2020-01-01 RX ADMIN — TIOTROPIUM BROMIDE 1 CAPSULE(S): 18 CAPSULE ORAL; RESPIRATORY (INHALATION) at 08:18

## 2020-01-01 RX ADMIN — Medication 50 MILLIGRAM(S): at 05:08

## 2020-01-01 RX ADMIN — Medication 500 MILLIGRAM(S): at 11:27

## 2020-01-01 RX ADMIN — ADENOSINE 6 MILLIGRAM(S): 3 INJECTION INTRAVENOUS at 09:20

## 2020-01-01 RX ADMIN — HEPARIN SODIUM 6 UNIT(S)/HR: 5000 INJECTION INTRAVENOUS; SUBCUTANEOUS at 11:52

## 2020-01-01 RX ADMIN — Medication 12.5 MILLIGRAM(S): at 14:41

## 2020-01-01 RX ADMIN — ATORVASTATIN CALCIUM 40 MILLIGRAM(S): 80 TABLET, FILM COATED ORAL at 22:09

## 2020-01-01 RX ADMIN — Medication 650 MILLIGRAM(S): at 11:34

## 2020-01-01 RX ADMIN — TIOTROPIUM BROMIDE 1 CAPSULE(S): 18 CAPSULE ORAL; RESPIRATORY (INHALATION) at 08:44

## 2020-01-01 RX ADMIN — ISOSORBIDE DINITRATE 10 MILLIGRAM(S): 5 TABLET ORAL at 13:18

## 2020-01-01 RX ADMIN — NAFCILLIN 200 GRAM(S): 10 INJECTION, POWDER, FOR SOLUTION INTRAVENOUS at 13:36

## 2020-01-01 RX ADMIN — PANTOPRAZOLE SODIUM 40 MILLIGRAM(S): 20 TABLET, DELAYED RELEASE ORAL at 05:32

## 2020-01-01 RX ADMIN — Medication 1 TABLET(S): at 05:05

## 2020-01-01 RX ADMIN — NAFCILLIN 200 GRAM(S): 10 INJECTION, POWDER, FOR SOLUTION INTRAVENOUS at 09:59

## 2020-01-01 RX ADMIN — SODIUM CHLORIDE 500 MILLILITER(S): 9 INJECTION, SOLUTION INTRAVENOUS at 09:53

## 2020-01-01 RX ADMIN — PANTOPRAZOLE SODIUM 40 MILLIGRAM(S): 20 TABLET, DELAYED RELEASE ORAL at 05:56

## 2020-01-01 RX ADMIN — HEPARIN SODIUM 5000 UNIT(S): 5000 INJECTION INTRAVENOUS; SUBCUTANEOUS at 21:56

## 2020-01-01 RX ADMIN — ATORVASTATIN CALCIUM 40 MILLIGRAM(S): 80 TABLET, FILM COATED ORAL at 22:12

## 2020-01-01 RX ADMIN — IRON SUCROSE 210 MILLIGRAM(S): 20 INJECTION, SOLUTION INTRAVENOUS at 09:53

## 2020-01-01 RX ADMIN — AMIODARONE HYDROCHLORIDE 200 MILLIGRAM(S): 400 TABLET ORAL at 05:15

## 2020-01-01 RX ADMIN — Medication 650 MILLIGRAM(S): at 05:16

## 2020-01-01 RX ADMIN — TIOTROPIUM BROMIDE 1 CAPSULE(S): 18 CAPSULE ORAL; RESPIRATORY (INHALATION) at 11:37

## 2020-01-01 RX ADMIN — CEFTRIAXONE 100 MILLIGRAM(S): 500 INJECTION, POWDER, FOR SOLUTION INTRAMUSCULAR; INTRAVENOUS at 09:12

## 2020-01-01 RX ADMIN — NAFCILLIN 200 GRAM(S): 10 INJECTION, POWDER, FOR SOLUTION INTRAVENOUS at 11:32

## 2020-01-01 RX ADMIN — Medication 1 TABLET(S): at 05:47

## 2020-01-01 RX ADMIN — TIOTROPIUM BROMIDE 1 CAPSULE(S): 18 CAPSULE ORAL; RESPIRATORY (INHALATION) at 08:00

## 2020-01-01 RX ADMIN — Medication 1 TABLET(S): at 17:18

## 2020-01-01 RX ADMIN — Medication 25 MILLIGRAM(S): at 22:27

## 2020-01-01 RX ADMIN — Medication 3 MILLIGRAM(S): at 02:45

## 2020-01-01 RX ADMIN — Medication 0.25 MILLIGRAM(S): at 23:31

## 2020-01-01 RX ADMIN — NAFCILLIN 200 GRAM(S): 10 INJECTION, POWDER, FOR SOLUTION INTRAVENOUS at 05:05

## 2020-01-01 RX ADMIN — HEPARIN SODIUM 6 UNIT(S)/HR: 5000 INJECTION INTRAVENOUS; SUBCUTANEOUS at 21:55

## 2020-01-01 RX ADMIN — AMIODARONE HYDROCHLORIDE 400 MILLIGRAM(S): 400 TABLET ORAL at 17:18

## 2020-01-01 RX ADMIN — Medication 25 MILLIGRAM(S): at 22:09

## 2020-01-01 RX ADMIN — Medication 1 TABLET(S): at 13:16

## 2020-01-01 RX ADMIN — AMIODARONE HYDROCHLORIDE 200 MILLIGRAM(S): 400 TABLET ORAL at 05:32

## 2020-01-01 RX ADMIN — AMIODARONE HYDROCHLORIDE 400 MILLIGRAM(S): 400 TABLET ORAL at 05:47

## 2020-01-01 RX ADMIN — Medication 5 UNIT(S): at 15:23

## 2020-01-01 RX ADMIN — Medication 50 MILLIGRAM(S): at 17:01

## 2020-01-01 RX ADMIN — Medication 3 MILLIGRAM(S): at 21:51

## 2020-01-01 RX ADMIN — Medication 1 TABLET(S): at 11:56

## 2020-01-01 RX ADMIN — Medication 2 MILLIGRAM(S): at 11:16

## 2020-01-01 RX ADMIN — AMIODARONE HYDROCHLORIDE 400 MILLIGRAM(S): 400 TABLET ORAL at 06:10

## 2020-01-01 RX ADMIN — CHLORHEXIDINE GLUCONATE 1 APPLICATION(S): 213 SOLUTION TOPICAL at 06:15

## 2020-01-01 RX ADMIN — Medication 50 MILLIEQUIVALENT(S): at 14:57

## 2020-01-01 RX ADMIN — CHLORHEXIDINE GLUCONATE 1 APPLICATION(S): 213 SOLUTION TOPICAL at 05:08

## 2020-01-01 RX ADMIN — Medication 1 TABLET(S): at 13:26

## 2020-01-01 RX ADMIN — PANTOPRAZOLE SODIUM 40 MILLIGRAM(S): 20 TABLET, DELAYED RELEASE ORAL at 05:05

## 2020-01-01 RX ADMIN — Medication 650 MILLIGRAM(S): at 12:15

## 2020-01-01 RX ADMIN — Medication 2.87 MICROGRAM(S)/KG/MIN: at 21:04

## 2020-01-01 RX ADMIN — NAFCILLIN 200 GRAM(S): 10 INJECTION, POWDER, FOR SOLUTION INTRAVENOUS at 06:10

## 2020-01-01 RX ADMIN — INSULIN GLARGINE 5 UNIT(S): 100 INJECTION, SOLUTION SUBCUTANEOUS at 21:52

## 2020-01-01 RX ADMIN — Medication 3 MILLIGRAM(S): at 22:09

## 2020-01-01 RX ADMIN — GABAPENTIN 300 MILLIGRAM(S): 400 CAPSULE ORAL at 13:06

## 2020-01-01 RX ADMIN — Medication 1 TABLET(S): at 15:57

## 2020-01-01 RX ADMIN — SERTRALINE 25 MILLIGRAM(S): 25 TABLET, FILM COATED ORAL at 12:56

## 2020-01-01 RX ADMIN — Medication 81 MILLIGRAM(S): at 11:28

## 2020-01-01 RX ADMIN — Medication 500 MILLIGRAM(S): at 12:56

## 2020-01-01 RX ADMIN — AMIODARONE HYDROCHLORIDE 400 MILLIGRAM(S): 400 TABLET ORAL at 05:23

## 2020-01-01 RX ADMIN — CHLORHEXIDINE GLUCONATE 1 APPLICATION(S): 213 SOLUTION TOPICAL at 06:18

## 2020-01-01 RX ADMIN — AMIODARONE HYDROCHLORIDE 400 MILLIGRAM(S): 400 TABLET ORAL at 05:33

## 2020-01-01 RX ADMIN — HEPARIN SODIUM 5000 UNIT(S): 5000 INJECTION INTRAVENOUS; SUBCUTANEOUS at 06:39

## 2020-01-01 RX ADMIN — NAFCILLIN 200 GRAM(S): 10 INJECTION, POWDER, FOR SOLUTION INTRAVENOUS at 06:11

## 2020-01-01 RX ADMIN — AMIODARONE HYDROCHLORIDE 400 MILLIGRAM(S): 400 TABLET ORAL at 16:17

## 2020-01-01 RX ADMIN — Medication 1 TABLET(S): at 21:52

## 2020-01-01 RX ADMIN — Medication 3 MILLIGRAM(S): at 22:00

## 2020-01-01 RX ADMIN — Medication 1 TABLET(S): at 13:54

## 2020-01-01 RX ADMIN — ISOSORBIDE DINITRATE 10 MILLIGRAM(S): 5 TABLET ORAL at 12:43

## 2020-01-01 RX ADMIN — Medication 1 TABLET(S): at 06:17

## 2020-01-01 RX ADMIN — Medication 2.87 MICROGRAM(S)/KG/MIN: at 08:40

## 2020-01-01 RX ADMIN — NAFCILLIN 200 GRAM(S): 10 INJECTION, POWDER, FOR SOLUTION INTRAVENOUS at 14:43

## 2020-01-01 RX ADMIN — ERYTHROPOIETIN 10000 UNIT(S): 10000 INJECTION, SOLUTION INTRAVENOUS; SUBCUTANEOUS at 13:12

## 2020-01-01 RX ADMIN — Medication 1 TABLET(S): at 13:31

## 2020-01-01 RX ADMIN — SENNA PLUS 2 TABLET(S): 8.6 TABLET ORAL at 21:04

## 2020-01-01 RX ADMIN — HEPARIN SODIUM 5000 UNIT(S): 5000 INJECTION INTRAVENOUS; SUBCUTANEOUS at 11:57

## 2020-01-01 RX ADMIN — Medication 81 MILLIGRAM(S): at 11:35

## 2020-01-01 RX ADMIN — NAFCILLIN 200 GRAM(S): 10 INJECTION, POWDER, FOR SOLUTION INTRAVENOUS at 17:11

## 2020-01-01 RX ADMIN — Medication 25 MILLIGRAM(S): at 13:41

## 2020-01-01 RX ADMIN — Medication 650 MILLIGRAM(S): at 18:02

## 2020-01-01 RX ADMIN — INSULIN GLARGINE 5 UNIT(S): 100 INJECTION, SOLUTION SUBCUTANEOUS at 21:51

## 2020-01-01 RX ADMIN — Medication 1 TABLET(S): at 05:32

## 2020-01-01 RX ADMIN — Medication 25 MILLIGRAM(S): at 05:08

## 2020-01-01 RX ADMIN — Medication 50 MILLIEQUIVALENT(S): at 11:32

## 2020-01-01 RX ADMIN — NAFCILLIN 200 GRAM(S): 10 INJECTION, POWDER, FOR SOLUTION INTRAVENOUS at 09:49

## 2020-01-01 RX ADMIN — Medication 2: at 16:33

## 2020-01-01 RX ADMIN — SERTRALINE 25 MILLIGRAM(S): 25 TABLET, FILM COATED ORAL at 10:53

## 2020-01-01 RX ADMIN — Medication 2: at 11:52

## 2020-01-01 RX ADMIN — ONDANSETRON 4 MILLIGRAM(S): 8 TABLET, FILM COATED ORAL at 13:45

## 2020-01-01 RX ADMIN — ISOSORBIDE DINITRATE 10 MILLIGRAM(S): 5 TABLET ORAL at 06:17

## 2020-01-01 RX ADMIN — PANTOPRAZOLE SODIUM 40 MILLIGRAM(S): 20 TABLET, DELAYED RELEASE ORAL at 05:26

## 2020-01-01 RX ADMIN — NAFCILLIN 200 GRAM(S): 10 INJECTION, POWDER, FOR SOLUTION INTRAVENOUS at 22:12

## 2020-01-01 RX ADMIN — Medication 1 TABLET(S): at 05:15

## 2020-01-01 RX ADMIN — Medication 5 UNIT(S): at 06:24

## 2020-01-01 RX ADMIN — Medication 5 UNIT(S): at 18:08

## 2020-01-01 RX ADMIN — Medication 650 MILLIGRAM(S): at 22:21

## 2020-01-01 RX ADMIN — CHLORHEXIDINE GLUCONATE 1 APPLICATION(S): 213 SOLUTION TOPICAL at 05:16

## 2020-01-09 RX ORDER — BUMETANIDE 1 MG/1
1 TABLET ORAL
Refills: 0 | Status: DISCONTINUED | COMMUNITY
End: 2020-01-09

## 2020-01-17 ENCOUNTER — CHART COPY (OUTPATIENT)
Age: 70
End: 2020-01-17

## 2020-01-17 ENCOUNTER — APPOINTMENT (OUTPATIENT)
Dept: CARDIOLOGY | Facility: CLINIC | Age: 70
End: 2020-01-17
Payer: MEDICARE

## 2020-01-17 VITALS
HEART RATE: 68 BPM | SYSTOLIC BLOOD PRESSURE: 108 MMHG | DIASTOLIC BLOOD PRESSURE: 66 MMHG | WEIGHT: 125 LBS | BODY MASS INDEX: 24.41 KG/M2

## 2020-01-17 PROCEDURE — 93000 ELECTROCARDIOGRAM COMPLETE: CPT

## 2020-01-17 PROCEDURE — 99214 OFFICE O/P EST MOD 30 MIN: CPT

## 2020-01-17 RX ORDER — CIPROFLOXACIN HYDROCHLORIDE 500 MG/1
500 TABLET, FILM COATED ORAL
Qty: 14 | Refills: 0 | Status: COMPLETED | COMMUNITY
Start: 2020-01-17 | End: 2020-01-24

## 2020-01-20 RX ORDER — ISOSORBIDE DINITRATE 10 MG/1
10 TABLET ORAL EVERY 8 HOURS
Qty: 30 | Refills: 0 | Status: DISCONTINUED | COMMUNITY
Start: 2019-11-24 | End: 2020-01-20

## 2020-01-20 RX ORDER — HYDRALAZINE HYDROCHLORIDE 25 MG/1
25 TABLET ORAL 3 TIMES DAILY
Qty: 90 | Refills: 0 | Status: DISCONTINUED | COMMUNITY
Start: 2019-11-24 | End: 2020-01-20

## 2020-01-22 LAB
ANION GAP SERPL CALC-SCNC: 19 MMOL/L
BUN SERPL-MCNC: 48 MG/DL
CALCIUM SERPL-MCNC: 9.4 MG/DL
CHLORIDE SERPL-SCNC: 103 MMOL/L
CO2 SERPL-SCNC: 20 MMOL/L
CREAT SERPL-MCNC: 1.6 MG/DL
GLUCOSE SERPL-MCNC: 157 MG/DL
POTASSIUM SERPL-SCNC: 4.9 MMOL/L
SODIUM SERPL-SCNC: 142 MMOL/L

## 2020-01-23 NOTE — HISTORY OF PRESENT ILLNESS
[FreeTextEntry1] : 67 y/o F with h/o DM, paroxysmal afib, CAD s/p MI and TVD s/p 3v-CABG (LIMA-LAD, SVG-OM and SVG-PDA), post operative course was complicated by persistent symptoms of heart failure, Pleural effusion that required thoracentesis x2, GIB and urinary retention. She also had paroxysmal afib post Op. She was started on amiodarone. No A/C given while hospitalized due to GIB. Patient was discharged on 10/18 home with a Castellon catheter. Her LVEF at discharge was ~ 20% with a restrictive filling pattern. She was readmitted less than two weeks after discharge due pain and swelling in RLE and reaccumulation of pleural effusion. She was treated with Abx for presumed infection of her leg and got another thoracentesis followed by pleurodesis due to recurrent effusion. She was discharged to [facility]. \par \par Patient was recently discharged home from rehab, she reports being able to walk around the house. No significant SOB reported. No LOC or chest pain. \par \par She is compliant with low salt diet and meds

## 2020-01-23 NOTE — ASSESSMENT
[FreeTextEntry1] : 68 y/o F with h/o DM, CAD s/p MI, triple vessel disease s/p CABG, ICM, chronic systolic HF coming for follow up. Patient is fluid overloaded on exam.

## 2020-01-31 ENCOUNTER — LABORATORY RESULT (OUTPATIENT)
Age: 70
End: 2020-01-31

## 2020-02-03 ENCOUNTER — RX RENEWAL (OUTPATIENT)
Age: 70
End: 2020-02-03

## 2020-02-05 ENCOUNTER — APPOINTMENT (OUTPATIENT)
Dept: UROGYNECOLOGY | Facility: CLINIC | Age: 70
End: 2020-02-05
Payer: MEDICARE

## 2020-02-07 ENCOUNTER — APPOINTMENT (OUTPATIENT)
Dept: CARDIOLOGY | Facility: CLINIC | Age: 70
End: 2020-02-07
Payer: MEDICARE

## 2020-02-09 LAB
FERRITIN SERPL-MCNC: 215 NG/ML
IRON SATN MFR SERPL: 15 %
IRON SERPL-MCNC: 30 UG/DL
TIBC SERPL-MCNC: 195 UG/DL
TRANSFERRIN SERPL-MCNC: 170 MG/DL
UIBC SERPL-MCNC: 165 UG/DL

## 2020-02-14 ENCOUNTER — LABORATORY RESULT (OUTPATIENT)
Age: 70
End: 2020-02-14

## 2020-02-14 ENCOUNTER — APPOINTMENT (OUTPATIENT)
Dept: UROGYNECOLOGY | Facility: CLINIC | Age: 70
End: 2020-02-14
Payer: MEDICARE

## 2020-02-14 ENCOUNTER — OUTPATIENT (OUTPATIENT)
Dept: OUTPATIENT SERVICES | Facility: HOSPITAL | Age: 70
LOS: 1 days | Discharge: HOME | End: 2020-02-14

## 2020-02-14 ENCOUNTER — APPOINTMENT (OUTPATIENT)
Dept: HEMATOLOGY ONCOLOGY | Facility: CLINIC | Age: 70
End: 2020-02-14
Payer: MEDICARE

## 2020-02-14 ENCOUNTER — APPOINTMENT (OUTPATIENT)
Dept: CARDIOLOGY | Facility: CLINIC | Age: 70
End: 2020-02-14
Payer: MEDICARE

## 2020-02-14 ENCOUNTER — APPOINTMENT (OUTPATIENT)
Dept: INFUSION THERAPY | Facility: CLINIC | Age: 70
End: 2020-02-14

## 2020-02-14 VITALS
WEIGHT: 120 LBS | BODY MASS INDEX: 23.56 KG/M2 | HEIGHT: 60 IN | DIASTOLIC BLOOD PRESSURE: 55 MMHG | SYSTOLIC BLOOD PRESSURE: 106 MMHG | TEMPERATURE: 97.1 F | RESPIRATION RATE: 12 BRPM | HEART RATE: 62 BPM

## 2020-02-14 VITALS — DIASTOLIC BLOOD PRESSURE: 82 MMHG | SYSTOLIC BLOOD PRESSURE: 138 MMHG

## 2020-02-14 VITALS
SYSTOLIC BLOOD PRESSURE: 112 MMHG | HEART RATE: 67 BPM | DIASTOLIC BLOOD PRESSURE: 68 MMHG | BODY MASS INDEX: 23.44 KG/M2 | WEIGHT: 120 LBS

## 2020-02-14 DIAGNOSIS — Z98.890 OTHER SPECIFIED POSTPROCEDURAL STATES: Chronic | ICD-10-CM

## 2020-02-14 LAB
HCT VFR BLD CALC: 31.2 %
HGB BLD-MCNC: 10.7 G/DL
LDH SERPL-CCNC: 258
MCHC RBC-ENTMCNC: 28.8 PG
MCHC RBC-ENTMCNC: 34.3 G/DL
MCV RBC AUTO: 84.1 FL
PLATELET # BLD AUTO: 242 K/UL
PMV BLD: 10.9 FL
RBC # BLD: 3.71 M/UL
RBC # FLD: 13.8 %
RETICS # AUTO: 1.7 %
RETICS AGGREG/RBC NFR: 61.2 K/UL
WBC # FLD AUTO: 7.99 K/UL

## 2020-02-14 PROCEDURE — 93000 ELECTROCARDIOGRAM COMPLETE: CPT

## 2020-02-14 PROCEDURE — ZZZZZ: CPT

## 2020-02-14 PROCEDURE — 99214 OFFICE O/P EST MOD 30 MIN: CPT

## 2020-02-14 PROCEDURE — 99204 OFFICE O/P NEW MOD 45 MIN: CPT

## 2020-02-14 RX ORDER — IRON SUCROSE 20 MG/ML
200 INJECTION, SOLUTION INTRAVENOUS ONCE
Refills: 0 | Status: COMPLETED | OUTPATIENT
Start: 2020-02-14 | End: 2020-02-14

## 2020-02-14 RX ADMIN — IRON SUCROSE 200 MILLIGRAM(S): 20 INJECTION, SOLUTION INTRAVENOUS at 14:00

## 2020-02-14 RX ADMIN — IRON SUCROSE 220 MILLIGRAM(S): 20 INJECTION, SOLUTION INTRAVENOUS at 13:31

## 2020-02-14 NOTE — HISTORY OF PRESENT ILLNESS
[FreeTextEntry1] : \par RN\par \par Patient is present for montejo change\par Last seen 10/25/19 for trial of void\par \par H/o DM\par H/o MI\par H/o neuropathy, on Neurontin\par \par s/p CABG\par \par Failed pessary fitting \par Estrace cream for atrophy\par s/p Baclofen (hallucinations)\par s/p Flomax 0.4 mg (ANNA)\par \par Patient has been getting montejo changed at nursing home.\par \par Today, patient states she is doing well and wishes to move forward with surgery for prolapse.

## 2020-02-14 NOTE — COUNSELING
[FreeTextEntry1] : \par Pleas call our office if you should experience a fever greater than 100.4 \par \par Please continue to apply a pea size amount of the cream to the opening of the vagina three nights per week. \par \par Please return to the office in 1 month for Castellon change

## 2020-02-14 NOTE — REASON FOR VISIT
[Initial Consultation] : an initial consultation for [Family Member] : family member [FreeTextEntry2] : anemia

## 2020-02-14 NOTE — HISTORY OF PRESENT ILLNESS
[de-identified] : 68 year old white female referred by Dr Gandara for anemia. PMH is significant for DJD s/p hip arthroplasty and  CAD s/p CABG in 2019 complicated by pleural effusion requiring pleurodesis, RIC ,  CHF ( EF down to 25 % at some point ) , marked weight loss and deconditioning required long rehab stay . chronic urinary retention with indwelling montejo secondary to bladder prolapse .  history of gastric cancer.  Since discharge she started to walk with minimal assistance , has regained some strength and weight . She continues to complain of fatigue , exertional dyspnea . \par She is referred for progressive anemia. In October , Hb was 11.8 , most recent blood work shows Hb : 9.6 MCV : 88 , wbc : 4.1 ferritin : 215 iron 30 , TIBC : 195 saturation : 15 % \par  creatinine : 1.4 she lost 35 lbs over the past few months . denies any edema, orthopnea , fever , hematochezia .

## 2020-02-14 NOTE — ASSESSMENT
[FreeTextEntry1] : 69 year old white female with CAD , s/p CABG , CHF , pleural effusion ( post cardiotomy ) , CKD ,normocytic anemia  probably secondary to chronic disease including CKD . May benefit from iron supplementation given the low saturation and may consider EPO in the future if no satisfactory  response to IV iron ( intolerant to po iron ) . \par She was apprised of potential adverse effects of venofer especially mild hypersensitivity reaction . all her questions were addressed in detail .

## 2020-02-14 NOTE — DISCUSSION/SUMMARY
[FreeTextEntry1] : \par Incomplete bladder emptying-\par Patient placed into lithotomy position. 14 F montejo removed without difficulty with 50cc of clear urine drained from her bag.\par \par 14 Fr montejo catheter placed without difficulty using sterile technique for a return of 10 cc of clear yellow urine. Instilled 10 cc of sterile water into the balloon. Leg bag attached without difficulty. Precautions provided.\par \par Patient will return in 1 month for her next montejo change.

## 2020-02-14 NOTE — PHYSICAL EXAM
[de-identified] : no rales  [Normal] : normoactive bowel sounds, soft and nontender, no hepatosplenomegaly or masses appreciated [de-identified] : pale , chronically ill in no acute distress . [de-identified] : grade 3/6 systolic murmur .  [de-identified] : no edema.

## 2020-02-18 LAB
ALBUMIN MFR SERPL ELPH: 58 %
ALBUMIN SERPL-MCNC: 3.8 G/DL
ALBUMIN/GLOB SERPL: 1.4 RATIO
ALPHA1 GLOB MFR SERPL ELPH: 5.6 %
ALPHA1 GLOB SERPL ELPH-MCNC: 0.4 G/DL
ALPHA2 GLOB MFR SERPL ELPH: 11.6 %
ALPHA2 GLOB SERPL ELPH-MCNC: 0.8 G/DL
B-GLOBULIN MFR SERPL ELPH: 11.7 %
B-GLOBULIN SERPL ELPH-MCNC: 0.8 G/DL
DEPRECATED KAPPA LC FREE/LAMBDA SER: 2.06 RATIO
EPO SERPL-MCNC: 15.5 MIU/ML
GAMMA GLOB FLD ELPH-MCNC: 0.9 G/DL
GAMMA GLOB MFR SERPL ELPH: 13.1 %
INTERPRETATION SERPL IEP-IMP: NORMAL
KAPPA LC CSF-MCNC: 4.67 MG/DL
KAPPA LC SERPL-MCNC: 9.63 MG/DL
PROT SERPL-MCNC: 6.6 G/DL
PROT SERPL-MCNC: 6.6 G/DL
VIT B12 SERPL-MCNC: >2000 PG/ML

## 2020-02-18 NOTE — ASSESSMENT
[FreeTextEntry1] : \par 68 y/o F with h/o DM, CAD s/p MI, triple vessel disease s/p CABG, ICM, chronic systolic HF coming for follow up. Patient is euvolemic on exam.

## 2020-02-18 NOTE — HISTORY OF PRESENT ILLNESS
[FreeTextEntry1] : 70 y/o F with h/o DM, paroxysmal afib, CAD s/p MI and TVD s/p 3v-CABG (LIMA-LAD, SVG-OM and SVG-PDA), post operative course was complicated by persistent symptoms of heart failure, Pleural effusion that required thoracentesis x2, GIB and urinary retention. She also had paroxysmal afib post Op. She was started on amiodarone. No A/C given while hospitalized due to GIB. Patient was discharged on 10/18 home with a Castellon catheter. Her LVEF at discharge was ~ 20% with a restrictive filling pattern. She was readmitted less than two weeks after discharge due pain and swelling in RLE and reaccumulation of pleural effusion. She was treated with Abx for presumed infection of her leg and got another thoracentesis followed by pleurodesis due to recurrent effusion. She was discharged to [facility] She is at home now. A recent echocardiogram with Dr. Villafana showed LVEF > 40%. \par \par \par Patient is coming today for follow up. She reports feeling good. She is still very weak but can walk around the house without major difficulties. No PND, orthopnea, LOC. She recently had a mechanical fall, no loc, and hit her right side. No head trauma. \par \par \par \par

## 2020-02-19 RX ORDER — NITROFURANTOIN (MONOHYDRATE/MACROCRYSTALS) 25; 75 MG/1; MG/1
100 CAPSULE ORAL
Qty: 10 | Refills: 0 | Status: COMPLETED | COMMUNITY
Start: 2020-02-19 | End: 2020-02-24

## 2020-02-25 DIAGNOSIS — R33.9 RETENTION OF URINE, UNSPECIFIED: ICD-10-CM

## 2020-02-27 ENCOUNTER — APPOINTMENT (OUTPATIENT)
Dept: HEMATOLOGY ONCOLOGY | Facility: CLINIC | Age: 70
End: 2020-02-27

## 2020-02-27 ENCOUNTER — APPOINTMENT (OUTPATIENT)
Dept: INFUSION THERAPY | Facility: CLINIC | Age: 70
End: 2020-02-27

## 2020-02-28 ENCOUNTER — APPOINTMENT (OUTPATIENT)
Dept: INFUSION THERAPY | Facility: CLINIC | Age: 70
End: 2020-02-28

## 2020-02-28 RX ORDER — IRON SUCROSE 20 MG/ML
200 INJECTION, SOLUTION INTRAVENOUS ONCE
Refills: 0 | Status: COMPLETED | OUTPATIENT
Start: 2020-02-28 | End: 2020-02-28

## 2020-02-28 RX ADMIN — IRON SUCROSE 220 MILLIGRAM(S): 20 INJECTION, SOLUTION INTRAVENOUS at 14:43

## 2020-02-28 RX ADMIN — IRON SUCROSE 200 MILLIGRAM(S): 20 INJECTION, SOLUTION INTRAVENOUS at 15:15

## 2020-03-05 ENCOUNTER — LABORATORY RESULT (OUTPATIENT)
Age: 70
End: 2020-03-05

## 2020-03-05 ENCOUNTER — OUTPATIENT (OUTPATIENT)
Dept: OUTPATIENT SERVICES | Facility: HOSPITAL | Age: 70
LOS: 1 days | Discharge: HOME | End: 2020-03-05

## 2020-03-05 ENCOUNTER — APPOINTMENT (OUTPATIENT)
Dept: HEMATOLOGY ONCOLOGY | Facility: CLINIC | Age: 70
End: 2020-03-05
Payer: MEDICARE

## 2020-03-05 VITALS
WEIGHT: 112 LBS | BODY MASS INDEX: 21.99 KG/M2 | DIASTOLIC BLOOD PRESSURE: 62 MMHG | HEART RATE: 81 BPM | HEIGHT: 60 IN | RESPIRATION RATE: 12 BRPM | TEMPERATURE: 97.2 F | SYSTOLIC BLOOD PRESSURE: 143 MMHG

## 2020-03-05 DIAGNOSIS — Z98.890 OTHER SPECIFIED POSTPROCEDURAL STATES: Chronic | ICD-10-CM

## 2020-03-05 DIAGNOSIS — D64.9 ANEMIA, UNSPECIFIED: ICD-10-CM

## 2020-03-05 PROCEDURE — 99213 OFFICE O/P EST LOW 20 MIN: CPT

## 2020-03-05 NOTE — PHYSICAL EXAM
[Normal] : clear to auscultation bilaterally, no dullness, no wheezing [de-identified] : pale , chronically ill in no acute distress . [de-identified] : no rales  [de-identified] : grade 3/6 systolic murmur .  [de-identified] : no edema.

## 2020-03-05 NOTE — HISTORY OF PRESENT ILLNESS
[de-identified] : 68 year old white female referred by Dr Gandara for anemia. PMH is significant for DJD s/p hip arthroplasty and CAD s/p CABG in 2019 complicated by pleural effusion requiring pleurodesis, RIC , CHF ( EF down to 25 % at some point ) , marked weight loss and deconditioning required long rehab stay. chronic urinary retention with indwelling montejo secondary to bladder prolapse. history of gastric cancer. Since discharge she started to walk with minimal assistance , has regained some strength and weight. She continues to complain of fatigue , exertional dyspnea. \par She is referred for progressive anemia. In October , Hb was 11.8 , most recent blood work shows Hb : 9.6 MCV : 88 , wbc : 4.1 ferritin : 215 iron 30 , TIBC : 195 saturation : 15 % \par  creatinine : 1.4 she lost 35 lbs over the past few months. denies any edema, orthopnea , fever , hematochezia. \par \par  [de-identified] : 03/05/2020 Patient returns after venofer X 2 , she feels slightly more energy despite no significant improvement in Hb .

## 2020-03-05 NOTE — ASSESSMENT
[FreeTextEntry1] : 69 year old white female with CAD , s/p CABG , CHF , pleural effusion ( post cardiotomy ) , CKD ,normocytic anemia  probably secondary to chronic disease including CKD . May benefit from iron supplementation given the low saturation and may consider EPO in the future if no satisfactory  response to IV iron ( intolerant to po iron ) . \par \par S/P venofer , Hb essentially unchanged , will repeat ferritin and consider more iron and/or Procrit . Patient agreeable with the plan .

## 2020-03-18 LAB
FERRITIN SERPL-MCNC: 509 NG/ML
HCT VFR BLD CALC: 30.6 %
HGB BLD-MCNC: 10.4 G/DL
MCHC RBC-ENTMCNC: 28.7 PG
MCHC RBC-ENTMCNC: 34 G/DL
MCV RBC AUTO: 84.5 FL
PLATELET # BLD AUTO: 202 K/UL
PMV BLD: 9.7 FL
RBC # BLD: 3.62 M/UL
RBC # FLD: 13.6 %
WBC # FLD AUTO: 5.31 K/UL

## 2020-03-20 ENCOUNTER — APPOINTMENT (OUTPATIENT)
Dept: UROGYNECOLOGY | Facility: CLINIC | Age: 70
End: 2020-03-20
Payer: MEDICARE

## 2020-03-27 ENCOUNTER — APPOINTMENT (OUTPATIENT)
Dept: UROGYNECOLOGY | Facility: CLINIC | Age: 70
End: 2020-03-27
Payer: MEDICARE

## 2020-04-03 ENCOUNTER — APPOINTMENT (OUTPATIENT)
Dept: UROGYNECOLOGY | Facility: CLINIC | Age: 70
End: 2020-04-03
Payer: MEDICARE

## 2020-04-03 ENCOUNTER — OUTPATIENT (OUTPATIENT)
Dept: OUTPATIENT SERVICES | Facility: HOSPITAL | Age: 70
LOS: 1 days | Discharge: HOME | End: 2020-04-03

## 2020-04-03 VITALS
BODY MASS INDEX: 21.99 KG/M2 | HEIGHT: 60 IN | DIASTOLIC BLOOD PRESSURE: 62 MMHG | SYSTOLIC BLOOD PRESSURE: 143 MMHG | WEIGHT: 112 LBS

## 2020-04-03 DIAGNOSIS — Z98.890 OTHER SPECIFIED POSTPROCEDURAL STATES: Chronic | ICD-10-CM

## 2020-04-03 PROCEDURE — ZZZZZ: CPT

## 2020-04-03 NOTE — COUNSELING
[FreeTextEntry1] : \par Please call our office if you should experience a fever greater than 100.4.\par \par Please continue to apply a pea size amount of the cream to the opening of the vagina three nights per week. \par \par Please return to the office in 1 month for voiding trial/Castellon change

## 2020-04-03 NOTE — HISTORY OF PRESENT ILLNESS
[FreeTextEntry1] : \par RN\par \par Patient is present for montejo change\par Last seen 2/14/2020 for montejo change\par \par H/o DM\par H/o MI\par H/o neuropathy, on Neurontin\par \par s/p CABG\par \par Failed pessary fitting \par Estrace cream for atrophy\par s/p Baclofen (hallucinations)\par s/p Flomax 0.4 mg (ANNA)\par \par Today, patient states she is doing well and states she continues to wish to move forward with surgery. She wishes to have voiding trial at next montejo change.

## 2020-04-03 NOTE — DISCUSSION/SUMMARY
[FreeTextEntry1] : \par Urinary retention-\par Patient placed into lithotomy position. 14 F montejo removed without difficulty with 500 cc of clear urine drained from her bag.\par \par 14 Fr montejo catheter placed without difficulty using sterile technique for a return of 10 cc of clear yellow urine. Instilled 10 cc of sterile water into the balloon. Leg bag attached without difficulty. Precautions provided.\par \par Patient will return in 1 month for her next montejo change.

## 2020-04-15 LAB
ANION GAP SERPL CALC-SCNC: 15 MMOL/L
BASOPHILS # BLD AUTO: 0.02 K/UL
BASOPHILS NFR BLD AUTO: 0.4 %
BUN SERPL-MCNC: 72 MG/DL
CALCIUM SERPL-MCNC: 9.7 MG/DL
CHLORIDE SERPL-SCNC: 106 MMOL/L
CO2 SERPL-SCNC: 22 MMOL/L
CREAT SERPL-MCNC: 1.7 MG/DL
EOSINOPHIL # BLD AUTO: 0.09 K/UL
EOSINOPHIL NFR BLD AUTO: 2 %
FERRITIN SERPL-MCNC: 241 NG/ML
GLUCOSE SERPL-MCNC: 167 MG/DL
HCT VFR BLD CALC: 32.9 %
HGB BLD-MCNC: 9.8 G/DL
IMM GRANULOCYTES NFR BLD AUTO: 0.2 %
IRON SATN MFR SERPL: 17 %
IRON SERPL-MCNC: 37 UG/DL
LYMPHOCYTES # BLD AUTO: 1.28 K/UL
LYMPHOCYTES NFR BLD AUTO: 27.8 %
MAN DIFF?: NORMAL
MCHC RBC-ENTMCNC: 27.9 PG
MCHC RBC-ENTMCNC: 29.8 G/DL
MCV RBC AUTO: 93.7 FL
MONOCYTES # BLD AUTO: 0.47 K/UL
MONOCYTES NFR BLD AUTO: 10.2 %
NEUTROPHILS # BLD AUTO: 2.74 K/UL
NEUTROPHILS NFR BLD AUTO: 59.4 %
PLATELET # BLD AUTO: 179 K/UL
POTASSIUM SERPL-SCNC: 5.6 MMOL/L
RBC # BLD: 3.51 M/UL
RBC # FLD: 14.6 %
SODIUM SERPL-SCNC: 143 MMOL/L
TIBC SERPL-MCNC: 224 UG/DL
UIBC SERPL-MCNC: 187 UG/DL
WBC # FLD AUTO: 4.61 K/UL

## 2020-04-16 ENCOUNTER — APPOINTMENT (OUTPATIENT)
Dept: OBGYN | Facility: CLINIC | Age: 70
End: 2020-04-16
Payer: MEDICARE

## 2020-04-16 ENCOUNTER — OUTPATIENT (OUTPATIENT)
Dept: OUTPATIENT SERVICES | Facility: HOSPITAL | Age: 70
LOS: 1 days | Discharge: HOME | End: 2020-04-16

## 2020-04-16 DIAGNOSIS — Z98.890 OTHER SPECIFIED POSTPROCEDURAL STATES: Chronic | ICD-10-CM

## 2020-04-16 PROCEDURE — A4344: CPT

## 2020-04-16 PROCEDURE — 51702 INSERT TEMP BLADDER CATH: CPT

## 2020-04-17 ENCOUNTER — APPOINTMENT (OUTPATIENT)
Dept: UROGYNECOLOGY | Facility: CLINIC | Age: 70
End: 2020-04-17

## 2020-04-17 ENCOUNTER — APPOINTMENT (OUTPATIENT)
Dept: CARDIOLOGY | Facility: CLINIC | Age: 70
End: 2020-04-17
Payer: MEDICARE

## 2020-04-17 PROCEDURE — 99214 OFFICE O/P EST MOD 30 MIN: CPT

## 2020-04-17 NOTE — HISTORY OF PRESENT ILLNESS
[Verbal consent obtained from patient] : the patient, [unfilled] [No] : Patient has not been hospitalized for COVID-19. [FreeTextEntry1] : 70 y/o F with h/o DM, paroxysmal afib, CAD s/p MI and TVD s/p 3v-CABG (LIMA-LAD, SVG-OM and SVG-PDA), ICM with chronic systolic HF with LVEF recovered to ~45%. I called her today for telehealth visit for heart failure. Patient denies any SOB, PND, orthopnea. He reports having a recent episode of anxiety that improved with Xanax. She also reports having removed her Castellon inadvertently. She is coordinating with urology to have it replaced at home.  \par \par

## 2020-04-20 ENCOUNTER — LABORATORY RESULT (OUTPATIENT)
Age: 70
End: 2020-04-20

## 2020-04-24 ENCOUNTER — APPOINTMENT (OUTPATIENT)
Dept: CARDIOLOGY | Facility: CLINIC | Age: 70
End: 2020-04-24
Payer: MEDICARE

## 2020-04-24 VITALS
SYSTOLIC BLOOD PRESSURE: 116 MMHG | HEART RATE: 96 BPM | WEIGHT: 132 LBS | BODY MASS INDEX: 25.78 KG/M2 | DIASTOLIC BLOOD PRESSURE: 64 MMHG

## 2020-04-24 PROCEDURE — 99215 OFFICE O/P EST HI 40 MIN: CPT

## 2020-04-24 PROCEDURE — 93000 ELECTROCARDIOGRAM COMPLETE: CPT

## 2020-04-29 ENCOUNTER — RX RENEWAL (OUTPATIENT)
Age: 70
End: 2020-04-29

## 2020-05-08 ENCOUNTER — APPOINTMENT (OUTPATIENT)
Dept: CARDIOLOGY | Facility: CLINIC | Age: 70
End: 2020-05-08
Payer: MEDICARE

## 2020-05-08 DIAGNOSIS — E87.5 HYPERKALEMIA: ICD-10-CM

## 2020-05-08 LAB
ANION GAP SERPL CALC-SCNC: 15 MMOL/L
BUN SERPL-MCNC: 88 MG/DL
CALCIUM SERPL-MCNC: 9.3 MG/DL
CHLORIDE SERPL-SCNC: 115 MMOL/L
CO2 SERPL-SCNC: 15 MMOL/L
CREAT SERPL-MCNC: 3 MG/DL
GLUCOSE SERPL-MCNC: 93 MG/DL
POTASSIUM SERPL-SCNC: 4.2 MMOL/L
SODIUM SERPL-SCNC: 145 MMOL/L

## 2020-05-08 PROCEDURE — 99214 OFFICE O/P EST MOD 30 MIN: CPT

## 2020-05-11 ENCOUNTER — INPATIENT (INPATIENT)
Facility: HOSPITAL | Age: 70
LOS: 16 days | Discharge: HOME HEALTH | End: 2020-05-28
Attending: HOSPITALIST | Admitting: HOSPITALIST
Payer: MEDICARE

## 2020-05-11 VITALS
TEMPERATURE: 98 F | RESPIRATION RATE: 18 BRPM | HEART RATE: 131 BPM | DIASTOLIC BLOOD PRESSURE: 44 MMHG | SYSTOLIC BLOOD PRESSURE: 60 MMHG

## 2020-05-11 DIAGNOSIS — Z95.1 PRESENCE OF AORTOCORONARY BYPASS GRAFT: Chronic | ICD-10-CM

## 2020-05-11 DIAGNOSIS — Z98.890 OTHER SPECIFIED POSTPROCEDURAL STATES: Chronic | ICD-10-CM

## 2020-05-11 LAB
ALBUMIN SERPL ELPH-MCNC: 3.8 G/DL — SIGNIFICANT CHANGE UP (ref 3.5–5.2)
ALP SERPL-CCNC: 54 U/L — SIGNIFICANT CHANGE UP (ref 30–115)
ALT FLD-CCNC: 6 U/L — SIGNIFICANT CHANGE UP (ref 0–41)
ANION GAP SERPL CALC-SCNC: 16 MMOL/L — HIGH (ref 7–14)
APPEARANCE UR: ABNORMAL
APTT BLD: 31.8 SEC — SIGNIFICANT CHANGE UP (ref 27–39.2)
AST SERPL-CCNC: 16 U/L — SIGNIFICANT CHANGE UP (ref 0–41)
B PERT IGG+IGM PNL SER: CLEAR — SIGNIFICANT CHANGE UP
BACTERIA # UR AUTO: ABNORMAL
BILIRUB SERPL-MCNC: <0.2 MG/DL — SIGNIFICANT CHANGE UP (ref 0.2–1.2)
BILIRUB UR-MCNC: NEGATIVE — SIGNIFICANT CHANGE UP
BUN SERPL-MCNC: 95 MG/DL — CRITICAL HIGH (ref 10–20)
CALCIUM SERPL-MCNC: 9.2 MG/DL — SIGNIFICANT CHANGE UP (ref 8.5–10.1)
CHLORIDE SERPL-SCNC: 114 MMOL/L — HIGH (ref 98–110)
CO2 SERPL-SCNC: 10 MMOL/L — LOW (ref 17–32)
COLOR FLD: SIGNIFICANT CHANGE UP
COLOR SPEC: YELLOW — SIGNIFICANT CHANGE UP
CREAT SERPL-MCNC: 4.9 MG/DL — CRITICAL HIGH (ref 0.7–1.5)
DIFF PNL FLD: ABNORMAL
EOSINOPHIL # FLD: SIGNIFICANT CHANGE UP %
EPI CELLS # UR: 3 /HPF — SIGNIFICANT CHANGE UP (ref 0–5)
FLUID INTAKE SUBSTANCE CLASS: SIGNIFICANT CHANGE UP
FLUID SEGMENTED GRANULOCYTES: SIGNIFICANT CHANGE UP %
GLUCOSE SERPL-MCNC: 85 MG/DL — SIGNIFICANT CHANGE UP (ref 70–99)
GLUCOSE UR QL: NEGATIVE — SIGNIFICANT CHANGE UP
HCT VFR BLD CALC: 36.1 % — LOW (ref 37–47)
HGB BLD-MCNC: 11.3 G/DL — LOW (ref 12–16)
HYALINE CASTS # UR AUTO: 8 /LPF — HIGH (ref 0–7)
INR BLD: 1.01 RATIO — SIGNIFICANT CHANGE UP (ref 0.65–1.3)
KETONES UR-MCNC: NEGATIVE — SIGNIFICANT CHANGE UP
LACTATE SERPL-SCNC: 1.7 MMOL/L — SIGNIFICANT CHANGE UP (ref 0.7–2)
LEUKOCYTE ESTERASE UR-ACNC: ABNORMAL
LYMPHOCYTES # FLD: SIGNIFICANT CHANGE UP
MAGNESIUM SERPL-MCNC: 2.3 MG/DL — SIGNIFICANT CHANGE UP (ref 1.8–2.4)
MCHC RBC-ENTMCNC: 29.5 PG — SIGNIFICANT CHANGE UP (ref 27–31)
MCHC RBC-ENTMCNC: 31.3 G/DL — LOW (ref 32–37)
MCV RBC AUTO: 94.3 FL — SIGNIFICANT CHANGE UP (ref 81–99)
MONOS+MACROS # FLD: SIGNIFICANT CHANGE UP %
NITRITE UR-MCNC: NEGATIVE — SIGNIFICANT CHANGE UP
NRBC # BLD: 0 /100 WBCS — SIGNIFICANT CHANGE UP (ref 0–0)
NT-PROBNP SERPL-SCNC: HIGH PG/ML (ref 0–300)
PH UR: 7 — SIGNIFICANT CHANGE UP (ref 5–8)
PLATELET # BLD AUTO: 128 K/UL — LOW (ref 130–400)
POTASSIUM SERPL-MCNC: 4.9 MMOL/L — SIGNIFICANT CHANGE UP (ref 3.5–5)
POTASSIUM SERPL-SCNC: 4.9 MMOL/L — SIGNIFICANT CHANGE UP (ref 3.5–5)
PROT SERPL-MCNC: 5.8 G/DL — LOW (ref 6–8)
PROT UR-MCNC: ABNORMAL
PROTHROM AB SERPL-ACNC: 11.6 SEC — SIGNIFICANT CHANGE UP (ref 9.95–12.87)
RBC # BLD: 3.83 M/UL — LOW (ref 4.2–5.4)
RBC # FLD: 16.1 % — HIGH (ref 11.5–14.5)
RBC CASTS # UR COMP ASSIST: 3 /HPF — SIGNIFICANT CHANGE UP (ref 0–4)
RCV VOL RI: 3000 /UL — HIGH (ref 0–0)
SARS-COV-2 RNA SPEC QL NAA+PROBE: SIGNIFICANT CHANGE UP
SODIUM SERPL-SCNC: 140 MMOL/L — SIGNIFICANT CHANGE UP (ref 135–146)
SP GR SPEC: 1 — LOW (ref 1.01–1.02)
TOTAL NUCLEATED CELL COUNT, BODY FLUID: 226 /UL — SIGNIFICANT CHANGE UP
TROPONIN T SERPL-MCNC: 0.23 NG/ML — CRITICAL HIGH
TUBE TYPE: SIGNIFICANT CHANGE UP
UROBILINOGEN FLD QL: SIGNIFICANT CHANGE UP
WBC # BLD: 3.58 K/UL — LOW (ref 4.8–10.8)
WBC # FLD AUTO: 3.58 K/UL — LOW (ref 4.8–10.8)
WBC UR QL: >720 /HPF — HIGH (ref 0–5)

## 2020-05-11 PROCEDURE — 71045 X-RAY EXAM CHEST 1 VIEW: CPT | Mod: 26

## 2020-05-11 PROCEDURE — 74176 CT ABD & PELVIS W/O CONTRAST: CPT | Mod: 26

## 2020-05-11 PROCEDURE — 99285 EMERGENCY DEPT VISIT HI MDM: CPT | Mod: 25,GC

## 2020-05-11 PROCEDURE — 32554 ASPIRATE PLEURA W/O IMAGING: CPT

## 2020-05-11 PROCEDURE — 36556 INSERT NON-TUNNEL CV CATH: CPT | Mod: GC

## 2020-05-11 PROCEDURE — 93010 ELECTROCARDIOGRAM REPORT: CPT

## 2020-05-11 RX ORDER — NOREPINEPHRINE BITARTRATE/D5W 8 MG/250ML
0.05 PLASTIC BAG, INJECTION (ML) INTRAVENOUS
Qty: 8 | Refills: 0 | Status: DISCONTINUED | OUTPATIENT
Start: 2020-05-11 | End: 2020-05-12

## 2020-05-11 RX ORDER — SODIUM CHLORIDE 9 MG/ML
500 INJECTION, SOLUTION INTRAVENOUS ONCE
Refills: 0 | Status: COMPLETED | OUTPATIENT
Start: 2020-05-11 | End: 2020-05-11

## 2020-05-11 RX ADMIN — Medication 5.53 MICROGRAM(S)/KG/MIN: at 18:20

## 2020-05-11 RX ADMIN — SODIUM CHLORIDE 500 MILLILITER(S): 9 INJECTION, SOLUTION INTRAVENOUS at 20:11

## 2020-05-11 RX ADMIN — SODIUM CHLORIDE 500 MILLILITER(S): 9 INJECTION, SOLUTION INTRAVENOUS at 18:09

## 2020-05-11 NOTE — ED PROVIDER NOTE - CLINICAL SUMMARY MEDICAL DECISION MAKING FREE TEXT BOX
I personally evaluated the patient. I reviewed the Resident’s or Physician Assistant’s note (as assigned above), and agree with the findings and plan except as documented in my note. Patient labs and imaging reviewed, patient had therapeutic thoracentesis, patient clinical status much improved. Patient endorsed to icu, treated for uti

## 2020-05-11 NOTE — ASSESSMENT
[FreeTextEntry1] : 70 y/o F with h/o CAD s/p CABG, ICM, chronic systolic heart failure coming for evaluation of fatigue, palpitation, fast heart rate. She is in atrial flutter with ventricular rate of 96 bpm. Slightly fluid overloaded.

## 2020-05-11 NOTE — ED PROVIDER NOTE - OBJECTIVE STATEMENT
69yF pmhx DM, stomach ca, bladder prolapse, heart failure, s/p CABG 10/2019 c/o abdominal distension for the past couple of days, worsening; states these sx are consistent w/ her getting UTIs, as she has had two other episodes of similar sx since having an indwelling montejo catheter placed after her CABG; was treated w/ cipro as outpt; reports decreased PO fluids since sx began. Pt denies fever/chills, abd pain, n/v/d, cough, chest pain, SOB.

## 2020-05-11 NOTE — HISTORY OF PRESENT ILLNESS
[FreeTextEntry1] : 68 y/o F with h/o CAD s/p CABG, ICM, chronic systolic heart failure, DM coming today for evaluation of recent episodes of palpitations, fatigue and inability to sleep well. Patient reports feeling weaker than usual. Her daughter has reported noticing her heart rate in 130s. She has also been having urinary symptoms and was recently started on Abx.\par \par She denies having any significant worsening in SOB. However, she has been taking her furosemide more often that what she should.  I stopped it a few days ago due to concern for dehydration. Recent blood work showed potassium level 5.8 and creat 2.0. She claims to be compliant with Veltassa.

## 2020-05-11 NOTE — ED PROVIDER NOTE - NS ED ROS FT
Review of Systems:  	•	CONSTITUTIONAL - no fever, no diaphoresis  	•	SKIN - no rash, no lesions  	•	HEMATOLOGIC - no bleeding, no bruising  	•	EYES - no discharge, no injection  	•	ENT - no otorrhea, no rhinorrhea  	•	RESPIRATORY - no shortness of breath, no cough  	•	CARDIAC - no chest pain, no palpitations  	•	GI - +abd distension, no abd pain, no nausea, no vomiting, no diarrhea  	•	GENITO-URINARY - no dysuria, no hematuria  	•	MUSCULOSKELETAL - no joint pain, +swelling, no redness  	•	NEUROLOGIC - no dizziness, no headache

## 2020-05-11 NOTE — ED ADULT NURSE REASSESSMENT NOTE - NS ED NURSE REASSESS COMMENT FT1
L sided thoracentesis is done. Pt is resting on bed comfortably, on cardiac monitor. Respirations regular and unlabored. Comfort measures provided. Continue to monitor.

## 2020-05-11 NOTE — ED PROVIDER NOTE - CARE PLAN
Principal Discharge DX:	CHF (congestive heart failure)  Secondary Diagnosis:	RIC (acute kidney injury)  Secondary Diagnosis:	Hypotension

## 2020-05-11 NOTE — ED PROVIDER NOTE - FAMILY HISTORY
Mother  Still living? Unknown  FH: stomach cancer, Age at diagnosis: Age Unknown  FH: myocardial infarction, Age at diagnosis: Age Unknown

## 2020-05-11 NOTE — ED PROVIDER NOTE - PHYSICAL EXAMINATION
Vital Signs: Reviewed  GEN: AOx3, NAD, speaking full sentences  HEAD:  normocephalic, atraumatic  EYES:  PERRLA; conjunctivae without injection, drainage or discharge  ENMT:  nasal mucosa moist; mouth moist without ulcerations or lesions; throat moist without erythema, exudate, ulcerations or lesions  NECK:  supple, no masses  CARDIAC:  regular rate, normal S1 and S2, no murmurs, rubs or gallops  RESP:  respiratory rate and effort appear normal for age; lungs are clear to auscultation bilaterally; no rales or wheezes  ABDOMEN:  soft, nontender, profoundly distended  MUSCULOSKELETAL/NEURO: 2+ edema to b/l LE; normal movement, normal tone  SKIN:  normal skin color for age and race, well-perfused; warm and dry

## 2020-05-11 NOTE — ED PROVIDER NOTE - PROGRESS NOTE DETAILS
AG: bedside sono w/ diffuse ascites in abdomen, b-lines, poor cardiac squeeze. Conservative fluids, pressors. AG: spoke w/ Dr Gandara pt's heart failure dr who says pt has had episodes of abd distension following UTIs, treated w/ cipro, and recently had increased creatinine to 3 which he believes could be due to cipro; as well as episode of GI bleed during cath 6mos ago for which she was to f/u w/ GI this week. Patient found to have large Leuks on UA, given hx of UTI with cipro treatment, started levaquin.

## 2020-05-11 NOTE — ED PROVIDER NOTE - ATTENDING CONTRIBUTION TO CARE
69 year old female, pmhx of cabg, comes in with complaint worsening abdominal distension, decreased urine output, weakness and sob, no cp, no n/v/d, no loc    CONSTITUTIONAL: Well-developed; well-nourished; in no acute distress. Sitting up and providing appropriate history and physical examination  SKIN: skin exam is warm and dry, no acute rash.  HEAD: Normocephalic; atraumatic.  EYES: PERRL, 3 mm bilateral, no nystagmus, EOM intact; conjunctiva and sclera clear.  ENT: + Mild pharyngeal erythema, no exudate, no edema, No nasal discharge; airway clear.  NECK: Supple; non tender. + full passive ROM in all directions. No JVD  CARD: S1, S2 normal; no murmurs, gallops, or rubs. Regular rate and rhythm. + Symmetric Strong Pulses  RESP: No wheezes, + Bibasilar rales with left expiratory rhonchi. Diminished air movement bilaterally  ABD: soft; non-distended; non-tender. No Rebound, No Guarding, No signs of peritonitis, No CVA tenderness. No pulsatile abdominal mass. + Strong and Symmetric Pulses  EXT: Normal ROM. No clubbing, cyanosis or edema. Dp and Pt Pulses intact. Cap refill less than 3 seconds  NEURO: Alert, oriented, grossly unremarkable. No Focal deficits. GCS 15. NIH 0  PSYCH: Cooperative, appropriate.

## 2020-05-11 NOTE — ED ADULT NURSE NOTE - OBJECTIVE STATEMENT
Patient present with montejo catheter from home. Sent in by daughter with decreased urine output today. Noted to be hypotensive by ems an on triage. Patient present with montejo catheter from home. Sent in by daughter with decreased urine output today. Noted to be hypotensive by ems an on triage. B/L LE edema noted. Ascites present in abdomen. Patient has frequent urinary retention requiring foleys/

## 2020-05-11 NOTE — ED ADULT NURSE NOTE - NSIMPLEMENTINTERV_GEN_ALL_ED
Implemented All Fall with Harm Risk Interventions:  [place] to call system. Call bell, personal items and telephone within reach. Instruct patient to call for assistance. Room bathroom lighting operational. Non-slip footwear when patient is off stretcher. Physically safe environment: no spills, clutter or unnecessary equipment. Stretcher in lowest position, wheels locked, appropriate side rails in place. Provide visual cue, wrist band, yellow gown, etc. Monitor gait and stability. Monitor for mental status changes and reorient to person, place, and time. Review medications for side effects contributing to fall risk. Reinforce activity limits and safety measures with patient and family. Provide visual clues: red socks.

## 2020-05-12 ENCOUNTER — APPOINTMENT (OUTPATIENT)
Dept: CARDIOLOGY | Facility: CLINIC | Age: 70
End: 2020-05-12

## 2020-05-12 LAB
A1C WITH ESTIMATED AVERAGE GLUCOSE RESULT: 5.8 % — HIGH (ref 4–5.6)
ALBUMIN FLD-MCNC: 1.5 G/DL — SIGNIFICANT CHANGE UP
ANION GAP SERPL CALC-SCNC: 15 MMOL/L — HIGH (ref 7–14)
BASE EXCESS BLDV CALC-SCNC: -19.9 MMOL/L — LOW (ref -2–2)
BASOPHILS # BLD AUTO: 0.02 K/UL — SIGNIFICANT CHANGE UP (ref 0–0.2)
BASOPHILS NFR BLD AUTO: 0.3 % — SIGNIFICANT CHANGE UP (ref 0–1)
BUN SERPL-MCNC: 99 MG/DL — CRITICAL HIGH (ref 10–20)
CA-I SERPL-SCNC: 1.32 MMOL/L — HIGH (ref 1.12–1.3)
CALCIUM SERPL-MCNC: 9 MG/DL — SIGNIFICANT CHANGE UP (ref 8.5–10.1)
CHLORIDE SERPL-SCNC: 115 MMOL/L — HIGH (ref 98–110)
CO2 SERPL-SCNC: 11 MMOL/L — LOW (ref 17–32)
CREAT ?TM UR-MCNC: 102 MG/DL — SIGNIFICANT CHANGE UP
CREAT SERPL-MCNC: 4.9 MG/DL — CRITICAL HIGH (ref 0.7–1.5)
EOSINOPHIL # BLD AUTO: 0.09 K/UL — SIGNIFICANT CHANGE UP (ref 0–0.7)
EOSINOPHIL NFR BLD AUTO: 1.2 % — SIGNIFICANT CHANGE UP (ref 0–8)
ESTIMATED AVERAGE GLUCOSE: 120 MG/DL — HIGH (ref 68–114)
GAS PNL BLDV: 140 MMOL/L — SIGNIFICANT CHANGE UP (ref 136–145)
GAS PNL BLDV: SIGNIFICANT CHANGE UP
GLUCOSE BLDC GLUCOMTR-MCNC: 145 MG/DL — HIGH (ref 70–99)
GLUCOSE BLDC GLUCOMTR-MCNC: 148 MG/DL — HIGH (ref 70–99)
GLUCOSE BLDC GLUCOMTR-MCNC: 153 MG/DL — HIGH (ref 70–99)
GLUCOSE BLDC GLUCOMTR-MCNC: 188 MG/DL — HIGH (ref 70–99)
GLUCOSE FLD-MCNC: 114 MG/DL — SIGNIFICANT CHANGE UP
GLUCOSE SERPL-MCNC: 186 MG/DL — HIGH (ref 70–99)
GRAM STN FLD: SIGNIFICANT CHANGE UP
GRAM STN FLD: SIGNIFICANT CHANGE UP
HCO3 BLDV-SCNC: 10 MMOL/L — LOW (ref 22–29)
HCT VFR BLD CALC: 39.6 % — SIGNIFICANT CHANGE UP (ref 37–47)
HCT VFR BLDA CALC: 35.3 % — SIGNIFICANT CHANGE UP (ref 34–44)
HGB BLD CALC-MCNC: 11.5 G/DL — LOW (ref 14–18)
HGB BLD-MCNC: 11.8 G/DL — LOW (ref 12–16)
IMM GRANULOCYTES NFR BLD AUTO: 1.5 % — HIGH (ref 0.1–0.3)
LACTATE BLDV-MCNC: 0.5 MMOL/L — SIGNIFICANT CHANGE UP (ref 0.5–1.6)
LDH SERPL L TO P-CCNC: 60 U/L — SIGNIFICANT CHANGE UP
LYMPHOCYTES # BLD AUTO: 1.26 K/UL — SIGNIFICANT CHANGE UP (ref 1.2–3.4)
LYMPHOCYTES # BLD AUTO: 16.2 % — LOW (ref 20.5–51.1)
MAGNESIUM SERPL-MCNC: 2.2 MG/DL — SIGNIFICANT CHANGE UP (ref 1.8–2.4)
MCHC RBC-ENTMCNC: 28.3 PG — SIGNIFICANT CHANGE UP (ref 27–31)
MCHC RBC-ENTMCNC: 29.8 G/DL — LOW (ref 32–37)
MCV RBC AUTO: 95 FL — SIGNIFICANT CHANGE UP (ref 81–99)
MONOCYTES # BLD AUTO: 0.46 K/UL — SIGNIFICANT CHANGE UP (ref 0.1–0.6)
MONOCYTES NFR BLD AUTO: 5.9 % — SIGNIFICANT CHANGE UP (ref 1.7–9.3)
NEUTROPHILS # BLD AUTO: 5.82 K/UL — SIGNIFICANT CHANGE UP (ref 1.4–6.5)
NEUTROPHILS NFR BLD AUTO: 74.9 % — SIGNIFICANT CHANGE UP (ref 42.2–75.2)
NRBC # BLD: 0 /100 WBCS — SIGNIFICANT CHANGE UP (ref 0–0)
OSMOLALITY UR: 319 MOS/KG — SIGNIFICANT CHANGE UP (ref 50–1400)
PCO2 BLDV: 41 MMHG — SIGNIFICANT CHANGE UP (ref 41–51)
PH BLDV: 7.01 — LOW (ref 7.26–7.43)
PHOSPHATE 24H UR-MCNC: 11 MG/DL — SIGNIFICANT CHANGE UP
PHOSPHATE SERPL-MCNC: 6 MG/DL — HIGH (ref 2.1–4.9)
PLATELET # BLD AUTO: 168 K/UL — SIGNIFICANT CHANGE UP (ref 130–400)
PO2 BLDV: 47 MMHG — HIGH (ref 20–40)
POTASSIUM BLDV-SCNC: 4.1 MMOL/L — SIGNIFICANT CHANGE UP (ref 3.3–5.6)
POTASSIUM SERPL-MCNC: 5 MMOL/L — SIGNIFICANT CHANGE UP (ref 3.5–5)
POTASSIUM SERPL-SCNC: 5 MMOL/L — SIGNIFICANT CHANGE UP (ref 3.5–5)
PROT ?TM UR-MCNC: 465 MG/DLG/24H — SIGNIFICANT CHANGE UP
PROT FLD-MCNC: 2.4 G/DL — SIGNIFICANT CHANGE UP
PROT/CREAT UR-RTO: 4.6 RATIO — HIGH (ref 0–0.2)
RBC # BLD: 4.17 M/UL — LOW (ref 4.2–5.4)
RBC # FLD: 16.1 % — HIGH (ref 11.5–14.5)
SAO2 % BLDV: 81 % — SIGNIFICANT CHANGE UP
SODIUM SERPL-SCNC: 141 MMOL/L — SIGNIFICANT CHANGE UP (ref 135–146)
SODIUM UR-SCNC: 59 MMOL/L — SIGNIFICANT CHANGE UP
SPECIMEN SOURCE: SIGNIFICANT CHANGE UP
TROPONIN T SERPL-MCNC: 0.23 NG/ML — CRITICAL HIGH
TROPONIN T SERPL-MCNC: 0.25 NG/ML — CRITICAL HIGH
UUN UR-MCNC: 215 MG/DL — SIGNIFICANT CHANGE UP
WBC # BLD: 7.77 K/UL — SIGNIFICANT CHANGE UP (ref 4.8–10.8)
WBC # FLD AUTO: 7.77 K/UL — SIGNIFICANT CHANGE UP (ref 4.8–10.8)

## 2020-05-12 PROCEDURE — 93306 TTE W/DOPPLER COMPLETE: CPT | Mod: 26

## 2020-05-12 PROCEDURE — 76775 US EXAM ABDO BACK WALL LIM: CPT | Mod: 26

## 2020-05-12 RX ORDER — DOBUTAMINE HCL 250MG/20ML
5 VIAL (ML) INTRAVENOUS
Qty: 1000 | Refills: 0 | Status: DISCONTINUED | OUTPATIENT
Start: 2020-05-12 | End: 2020-05-12

## 2020-05-12 RX ORDER — HEPARIN SODIUM 5000 [USP'U]/ML
5000 INJECTION INTRAVENOUS; SUBCUTANEOUS EVERY 8 HOURS
Refills: 0 | Status: DISCONTINUED | OUTPATIENT
Start: 2020-05-12 | End: 2020-01-01

## 2020-05-12 RX ORDER — NOREPINEPHRINE BITARTRATE/D5W 8 MG/250ML
0.05 PLASTIC BAG, INJECTION (ML) INTRAVENOUS
Qty: 16 | Refills: 0 | Status: DISCONTINUED | OUTPATIENT
Start: 2020-05-12 | End: 2020-01-01

## 2020-05-12 RX ORDER — FUROSEMIDE 40 MG
60 TABLET ORAL ONCE
Refills: 0 | Status: COMPLETED | OUTPATIENT
Start: 2020-05-12 | End: 2020-05-12

## 2020-05-12 RX ORDER — INSULIN LISPRO 100/ML
5 VIAL (ML) SUBCUTANEOUS
Refills: 0 | Status: DISCONTINUED | OUTPATIENT
Start: 2020-05-12 | End: 2020-01-01

## 2020-05-12 RX ORDER — DOBUTAMINE HCL 250MG/20ML
5 VIAL (ML) INTRAVENOUS
Qty: 500 | Refills: 0 | Status: DISCONTINUED | OUTPATIENT
Start: 2020-05-12 | End: 2020-05-12

## 2020-05-12 RX ORDER — VANCOMYCIN HCL 1 G
VIAL (EA) INTRAVENOUS
Refills: 0 | Status: DISCONTINUED | OUTPATIENT
Start: 2020-05-12 | End: 2020-05-12

## 2020-05-12 RX ORDER — VANCOMYCIN HCL 1 G
750 VIAL (EA) INTRAVENOUS ONCE
Refills: 0 | Status: COMPLETED | OUTPATIENT
Start: 2020-05-12 | End: 2020-05-12

## 2020-05-12 RX ORDER — CEFTRIAXONE 500 MG/1
1000 INJECTION, POWDER, FOR SOLUTION INTRAMUSCULAR; INTRAVENOUS EVERY 24 HOURS
Refills: 0 | Status: DISCONTINUED | OUTPATIENT
Start: 2020-01-01 | End: 2020-01-01

## 2020-05-12 RX ORDER — INSULIN LISPRO 100/ML
9 VIAL (ML) SUBCUTANEOUS
Refills: 0 | Status: DISCONTINUED | OUTPATIENT
Start: 2020-05-12 | End: 2020-05-12

## 2020-05-12 RX ORDER — DEXTROSE 50 % IN WATER 50 %
25 SYRINGE (ML) INTRAVENOUS ONCE
Refills: 0 | Status: DISCONTINUED | OUTPATIENT
Start: 2020-05-12 | End: 2020-01-01

## 2020-05-12 RX ORDER — ATORVASTATIN CALCIUM 80 MG/1
40 TABLET, FILM COATED ORAL AT BEDTIME
Refills: 0 | Status: DISCONTINUED | OUTPATIENT
Start: 2020-05-12 | End: 2020-01-01

## 2020-05-12 RX ORDER — NOREPINEPHRINE BITARTRATE/D5W 8 MG/250ML
0.05 PLASTIC BAG, INJECTION (ML) INTRAVENOUS
Qty: 16 | Refills: 0 | Status: DISCONTINUED | OUTPATIENT
Start: 2020-05-12 | End: 2020-05-12

## 2020-05-12 RX ORDER — SODIUM CHLORIDE 9 MG/ML
1000 INJECTION, SOLUTION INTRAVENOUS
Refills: 0 | Status: DISCONTINUED | OUTPATIENT
Start: 2020-05-12 | End: 2020-01-01

## 2020-05-12 RX ORDER — HYDRALAZINE HCL 50 MG
10 TABLET ORAL EVERY 8 HOURS
Refills: 0 | Status: DISCONTINUED | OUTPATIENT
Start: 2020-05-12 | End: 2020-05-12

## 2020-05-12 RX ORDER — PANTOPRAZOLE SODIUM 20 MG/1
40 TABLET, DELAYED RELEASE ORAL
Refills: 0 | Status: DISCONTINUED | OUTPATIENT
Start: 2020-05-12 | End: 2020-01-01

## 2020-05-12 RX ORDER — ASCORBIC ACID 60 MG
500 TABLET,CHEWABLE ORAL DAILY
Refills: 0 | Status: DISCONTINUED | OUTPATIENT
Start: 2020-05-12 | End: 2020-01-01

## 2020-05-12 RX ORDER — CHLORHEXIDINE GLUCONATE 213 G/1000ML
1 SOLUTION TOPICAL
Refills: 0 | Status: DISCONTINUED | OUTPATIENT
Start: 2020-05-12 | End: 2020-01-01

## 2020-05-12 RX ORDER — ASPIRIN/CALCIUM CARB/MAGNESIUM 324 MG
81 TABLET ORAL DAILY
Refills: 0 | Status: DISCONTINUED | OUTPATIENT
Start: 2020-05-12 | End: 2020-01-01

## 2020-05-12 RX ORDER — CEFTRIAXONE 500 MG/1
1000 INJECTION, POWDER, FOR SOLUTION INTRAMUSCULAR; INTRAVENOUS ONCE
Refills: 0 | Status: COMPLETED | OUTPATIENT
Start: 2020-05-12 | End: 2020-05-12

## 2020-05-12 RX ORDER — SODIUM BICARBONATE 1 MEQ/ML
650 SYRINGE (ML) INTRAVENOUS EVERY 8 HOURS
Refills: 0 | Status: DISCONTINUED | OUTPATIENT
Start: 2020-05-12 | End: 2020-01-01

## 2020-05-12 RX ORDER — LACTOBACILLUS ACIDOPHILUS 100MM CELL
1 CAPSULE ORAL THREE TIMES A DAY
Refills: 0 | Status: DISCONTINUED | OUTPATIENT
Start: 2020-05-12 | End: 2020-01-01

## 2020-05-12 RX ORDER — DEXTROSE 50 % IN WATER 50 %
15 SYRINGE (ML) INTRAVENOUS ONCE
Refills: 0 | Status: DISCONTINUED | OUTPATIENT
Start: 2020-05-12 | End: 2020-01-01

## 2020-05-12 RX ORDER — VANCOMYCIN HCL 1 G
VIAL (EA) INTRAVENOUS
Refills: 0 | Status: DISCONTINUED | OUTPATIENT
Start: 2020-05-12 | End: 2020-01-01

## 2020-05-12 RX ORDER — CARVEDILOL PHOSPHATE 80 MG/1
9.38 CAPSULE, EXTENDED RELEASE ORAL EVERY 12 HOURS
Refills: 0 | Status: DISCONTINUED | OUTPATIENT
Start: 2020-05-12 | End: 2020-05-12

## 2020-05-12 RX ORDER — ISOSORBIDE DINITRATE 5 MG/1
5 TABLET ORAL THREE TIMES A DAY
Refills: 0 | Status: DISCONTINUED | OUTPATIENT
Start: 2020-05-12 | End: 2020-05-12

## 2020-05-12 RX ORDER — ACETAMINOPHEN 500 MG
650 TABLET ORAL EVERY 6 HOURS
Refills: 0 | Status: DISCONTINUED | OUTPATIENT
Start: 2020-05-12 | End: 2020-01-01

## 2020-05-12 RX ORDER — TIOTROPIUM BROMIDE 18 UG/1
1 CAPSULE ORAL; RESPIRATORY (INHALATION) DAILY
Refills: 0 | Status: DISCONTINUED | OUTPATIENT
Start: 2020-05-12 | End: 2020-01-01

## 2020-05-12 RX ORDER — CEFTRIAXONE 500 MG/1
INJECTION, POWDER, FOR SOLUTION INTRAMUSCULAR; INTRAVENOUS
Refills: 0 | Status: DISCONTINUED | OUTPATIENT
Start: 2020-05-12 | End: 2020-01-01

## 2020-05-12 RX ORDER — INSULIN GLARGINE 100 [IU]/ML
15 INJECTION, SOLUTION SUBCUTANEOUS EVERY MORNING
Refills: 0 | Status: DISCONTINUED | OUTPATIENT
Start: 2020-05-12 | End: 2020-01-01

## 2020-05-12 RX ORDER — CEFTRIAXONE 500 MG/1
INJECTION, POWDER, FOR SOLUTION INTRAMUSCULAR; INTRAVENOUS
Refills: 0 | Status: DISCONTINUED | OUTPATIENT
Start: 2020-05-12 | End: 2020-05-12

## 2020-05-12 RX ORDER — GLUCAGON INJECTION, SOLUTION 0.5 MG/.1ML
1 INJECTION, SOLUTION SUBCUTANEOUS ONCE
Refills: 0 | Status: DISCONTINUED | OUTPATIENT
Start: 2020-05-12 | End: 2020-01-01

## 2020-05-12 RX ORDER — DEXTROSE 50 % IN WATER 50 %
12.5 SYRINGE (ML) INTRAVENOUS ONCE
Refills: 0 | Status: DISCONTINUED | OUTPATIENT
Start: 2020-05-12 | End: 2020-01-01

## 2020-05-12 RX ORDER — SENNA PLUS 8.6 MG/1
2 TABLET ORAL AT BEDTIME
Refills: 0 | Status: DISCONTINUED | OUTPATIENT
Start: 2020-05-12 | End: 2020-01-01

## 2020-05-12 RX ORDER — SERTRALINE 25 MG/1
25 TABLET, FILM COATED ORAL DAILY
Refills: 0 | Status: DISCONTINUED | OUTPATIENT
Start: 2020-05-12 | End: 2020-01-01

## 2020-05-12 RX ORDER — INSULIN GLARGINE 100 [IU]/ML
27 INJECTION, SOLUTION SUBCUTANEOUS EVERY MORNING
Refills: 0 | Status: DISCONTINUED | OUTPATIENT
Start: 2020-05-12 | End: 2020-05-12

## 2020-05-12 RX ADMIN — Medication 60 MILLIGRAM(S): at 02:58

## 2020-05-12 RX ADMIN — HEPARIN SODIUM 5000 UNIT(S): 5000 INJECTION INTRAVENOUS; SUBCUTANEOUS at 22:50

## 2020-05-12 RX ADMIN — Medication 5 UNIT(S): at 12:04

## 2020-05-12 RX ADMIN — Medication 2.87 MICROGRAM(S)/KG/MIN: at 19:00

## 2020-05-12 RX ADMIN — Medication 60 MILLIGRAM(S): at 04:32

## 2020-05-12 RX ADMIN — TIOTROPIUM BROMIDE 1 CAPSULE(S): 18 CAPSULE ORAL; RESPIRATORY (INHALATION) at 08:56

## 2020-05-12 RX ADMIN — Medication 81 MILLIGRAM(S): at 12:04

## 2020-05-12 RX ADMIN — Medication 1 TABLET(S): at 06:42

## 2020-05-12 RX ADMIN — PANTOPRAZOLE SODIUM 40 MILLIGRAM(S): 20 TABLET, DELAYED RELEASE ORAL at 06:42

## 2020-05-12 RX ADMIN — Medication 5 UNIT(S): at 17:34

## 2020-05-12 RX ADMIN — Medication 500 MILLIGRAM(S): at 12:04

## 2020-05-12 RX ADMIN — SENNA PLUS 2 TABLET(S): 8.6 TABLET ORAL at 22:50

## 2020-05-12 RX ADMIN — CEFTRIAXONE 100 MILLIGRAM(S): 500 INJECTION, POWDER, FOR SOLUTION INTRAMUSCULAR; INTRAVENOUS at 10:34

## 2020-05-12 RX ADMIN — ATORVASTATIN CALCIUM 40 MILLIGRAM(S): 80 TABLET, FILM COATED ORAL at 22:50

## 2020-05-12 RX ADMIN — Medication 250 MILLIGRAM(S): at 11:05

## 2020-05-12 RX ADMIN — HEPARIN SODIUM 5000 UNIT(S): 5000 INJECTION INTRAVENOUS; SUBCUTANEOUS at 15:02

## 2020-05-12 RX ADMIN — INSULIN GLARGINE 15 UNIT(S): 100 INJECTION, SOLUTION SUBCUTANEOUS at 08:56

## 2020-05-12 RX ADMIN — SERTRALINE 25 MILLIGRAM(S): 25 TABLET, FILM COATED ORAL at 12:04

## 2020-05-12 RX ADMIN — HEPARIN SODIUM 5000 UNIT(S): 5000 INJECTION INTRAVENOUS; SUBCUTANEOUS at 06:42

## 2020-05-12 RX ADMIN — Medication 1 TABLET(S): at 15:02

## 2020-05-12 RX ADMIN — Medication 650 MILLIGRAM(S): at 15:02

## 2020-05-12 RX ADMIN — Medication 1 TABLET(S): at 22:50

## 2020-05-12 RX ADMIN — Medication 650 MILLIGRAM(S): at 22:50

## 2020-05-12 NOTE — CONSULT NOTE ADULT - ASSESSMENT
IMPRESSION:    Shock likely cardiogenic  HO HFREF and CAD  UTI   HO Chronic [place]     PLAN:    CNS: No depressants     HEENT: Oral care    PULMONARY:  HOB @ 45 degrees.  Aspiration precautions.  VBG for Ph.      CARDIOVASCULAR: Wean Levophed.  ECHO.  Consider Dobutamine.  [place].  FU Dr. Gandara.      GI: GI prophylaxis.  Feeding.  Bowel regimen     RENAL:  Follow up lytes.  Correct as needed.  Renal eval.      INFECTIOUS DISEASE: Follow up cultures.  Rocephin and Vanc for now.  FU Urine cultures     HEMATOLOGICAL:  DVT prophylaxis.      ENDOCRINE:  Follow up FS.  Insulin protocol if needed.    MUSCULOSKELETAL:  Bed chair position     ICU for now

## 2020-05-12 NOTE — CONSULT NOTE ADULT - ASSESSMENT
1. Anasarca. Doubt nephrotic syndrome, blood albumin is 3.8 (she does have significant proteinuria). Hold off diuretics for now given hypotension. However, if patient develops SOB, initiate IV Lasix.  2. RIC. Likely ATN given hypotension. May have had bladder outlet obstruction on admission, renal sonogram showed collapsed bladder and no hydronephrosis. Likely has underlying CKD given long standing proteinuria. Monitor I/Os. Daily BMP. No urgent indication for dialysis.   3. UTI/hypotension. Followup urine culture. On vanco/ceftriaxone. Trend vanco level.  4. HAGMA. Likely from kidney failure. Start PO bicarb.

## 2020-05-12 NOTE — H&P ADULT - NSICDXPASTSURGICALHX_GEN_ALL_CORE_FT
PAST SURGICAL HISTORY:  History of repair of hip fracture     S/P CABG (coronary artery bypass graft)

## 2020-05-12 NOTE — CONSULT NOTE ADULT - SUBJECTIVE AND OBJECTIVE BOX
[facility] INITIAL CONSULT NOTE  --------------------------------------------------------------------------------  HPI:  70 y/o female with pmh of DM, stomach ca, bladder prolapse, heart failure, s/p CABG 10/2019 presents complaining of abdominal distension for the past couple of days which has been worsening.  Patient states that her symptoms are similar to when she has urinary tract infections. She has had two other episodes of similar presenting symptoms since having an indwelling montejo catheter placed after her CABG (10/2019) as mentioned.  Patient was treated with cipro as outpt; reports decreased PO fluids since the symptoms began.  Patient has had decreased urine out from [place] in past few days. Montejo was flushed and urine output improved. Patient hypotensive and on pressor support. Creatinine was essentially normal in 12/2019. On admission creatinine was 4.9.  PAST HISTORY  --------------------------------------------------------------------------------  PAST MEDICAL & SURGICAL HISTORY:  Heart failure  Female bladder prolapse  Diabetes  S/P CABG (coronary artery bypass graft)  History of repair of hip fracture    FAMILY HISTORY:  FH: myocardial infarction (Mother)  FH: stomach cancer (Mother)    PAST SOCIAL HISTORY:  No current ETOH, tobacco, or illicit drug use    ALLERGIES & MEDICATIONS  --------------------------------------------------------------------------------  Allergies    No Known Allergies    Standing Inpatient Medications  ascorbic acid 500 milliGRAM(s) Oral daily  aspirin  chewable 81 milliGRAM(s) Oral daily  atorvastatin 40 milliGRAM(s) Oral at bedtime  carvedilol Oral Tab/Cap - Peds 9.375 milliGRAM(s) Oral every 12 hours  cefTRIAXone   IVPB      chlorhexidine 4% Liquid 1 Application(s) Topical <User Schedule>  dextrose 5%. 1000 milliLiter(s) IV Continuous <Continuous>  dextrose 50% Injectable 12.5 Gram(s) IV Push once  dextrose 50% Injectable 25 Gram(s) IV Push once  dextrose 50% Injectable 25 Gram(s) IV Push once  DOBUTamine Infusion 5 MICROgram(s)/kG/Min IV Continuous <Continuous>  heparin   Injectable 5000 Unit(s) SubCutaneous every 8 hours  hydrALAZINE 10 milliGRAM(s) Oral every 8 hours  insulin glargine Injectable (LANTUS) 15 Unit(s) SubCutaneous every morning  insulin lispro Injectable (HumaLOG) 5 Unit(s) SubCutaneous three times a day before meals  isosorbide   dinitrate Tablet (ISORDIL) 5 milliGRAM(s) Oral three times a day  lactobacillus acidophilus 1 Tablet(s) Oral three times a day  norepinephrine Infusion 0.05 MICROgram(s)/kG/Min IV Continuous <Continuous>  pantoprazole    Tablet 40 milliGRAM(s) Oral before breakfast  senna 2 Tablet(s) Oral at bedtime  sertraline 25 milliGRAM(s) Oral daily  tiotropium 18 MICROgram(s) Capsule 1 Capsule(s) Inhalation daily  vancomycin  IVPB        PRN Inpatient Medications  acetaminophen   Tablet  650 milliGRAM(s) Oral every 6 hours PRN  dextrose 40% Gel 15 Gram(s) Oral once PRN  glucagon  Injectable 1 milliGRAM(s) IntraMuscular once PRN      REVIEW OF SYSTEMS  --------------------------------------------------------------------------------  Gen: No fevers/chills  Skin: No rashes  Head/Eyes/Ears/Mouth: No headache; No sinus pain/discomfort, sore throat  Respiratory: No dyspnea, cough, wheezing, hemoptysis  CV: No chest pain, PND, orthopnea  GI: No abdominal pain, diarrhea, constipation, nausea, vomiting, melena, hematochezia  : No increased frequency, dysuria, hematuria, nocturia  All other systems were reviewed and are negative, except as noted.    VITALS/PHYSICAL EXAM  --------------------------------------------------------------------------------  T(C): 36.6 (05-12-20 @ 08:00), Max: 36.6 (05-11-20 @ 17:50)  HR: 134 (05-12-20 @ 12:00) (112 - 136)  BP: 101/56 (05-12-20 @ 12:00) (60/44 - 868/54)  RR: 18 (05-12-20 @ 12:00) (0 - 27)  SpO2: 99% (05-12-20 @ 12:00) (88% - 100%)  Wt(kg): --  Height (cm): 157.5 (05-12-20 @ 01:20)  Weight (kg): 61.2 (05-12-20 @ 08:00)  BMI (kg/m2): 24.7 (05-12-20 @ 08:00)  BSA (m2): 1.62 (05-12-20 @ 08:00)      05-11-20 @ 07:01  -  05-12-20 @ 07:00  --------------------------------------------------------  IN: 247.1 mL / OUT: 65 mL / NET: 182.1 mL    05-12-20 @ 07:01  -  05-12-20 @ 12:08  --------------------------------------------------------  IN: 346 mL / OUT: 10 mL / NET: 336 mL      Physical Exam:  	Gen: NAD  	HEENT: Supple neck, clear oropharynx  	Pulm: CTA B/L  	CV: RRR, S1S2  	Back: No CVA tenderness; no sacral edema  	Abd: +BS, soft, nontender/nondistended  	: No suprapubic tenderness  	LE: Warm, edema  	Neuro: AAO x3    LABS/STUDIES  --------------------------------------------------------------------------------              11.8   7.77  >-----------<  168      [05-12-20 @ 04:14]              39.6     141  |  115  |  99  ----------------------------<  186      [05-12-20 @ 04:14]  5.0   |  11  |  4.9        Ca     9.0     [05-12-20 @ 04:14]      Mg     2.2     [05-12-20 @ 04:14]      Phos  6.0     [05-12-20 @ 04:14]    TPro  5.8  /  Alb  3.8  /  TBili  <0.2  /  DBili  x   /  AST  16  /  ALT  6   /  AlkPhos  54  [05-11-20 @ 19:23]    PT/INR: PT 11.60, INR 1.01       [05-11-20 @ 19:23]  PTT: 31.8       [05-11-20 @ 19:23]    Troponin 0.25      [05-12-20 @ 11:00]    Creatinine Trend:  SCr 4.9 [05-12 @ 04:14]  SCr 4.9 [05-11 @ 19:23]    Urinalysis - [05-11-20 @ 22:30]      Color Yellow / Appearance Turbid / SG 1.005 / pH 7.0      Gluc Negative / Ketone Negative  / Bili Negative / Urobili <2 mg/dL       Blood Moderate / Protein 100 mg/dL / Leuk Est Large / Nitrite Negative      RBC 3 / WBC >720 / Hyaline 8 / Gran  / Sq Epi  / Non Sq Epi 3 / Bacteria Moderate    Urine Creatinine 102      [05-12-20 @ 05:50]  Urine Protein 465      [05-12-20 @ 05:50]  Urine Sodium 59.0      [05-12-20 @ 05:50]  Urine Urea Nitrogen 215      [05-12-20 @ 05:50]  Urine Osmolality 319      [05-12-20 @ 05:50]    Iron 24, TIBC 204, %sat 12      [08-30-19 @ 07:16]  Ferritin 112      [08-30-19 @ 07:16]  HbA1c 5.7      [11-09-19 @ 01:20]  TSH 0.58      [08-02-19 @ 18:37]  Lipid: chol 153, , HDL 45, LDL 75      [08-30-19 @ 07:16]    HCV 0.11, Nonreact      [08-02-19 @ 05:59]

## 2020-05-12 NOTE — H&P ADULT - ASSESSMENT
68 y/o female with pmh of DM, stomach ca, bladder prolapse, heart failure, s/p CABG 10/2019 presents complaining of abdominal distension for the past couple of days which has been worsening.     # Abdominal distension which has been worsening, possibly secondary to urinary source of infection however not clear at this time, some element of Acute on chronic chf exacerbation might be possible given lab findings and presentation as well  [place] [address], follows with  HF specilist  - UA shows leukocytes and patient was recently started on cipro, now on levoquin from ED, will continue  - EKG shows sinus tachy with no changes compared to previous ekg  - cxr shows left sided changes from atelectasis as well as thoracentesis site  - ct abd pelvis showing ascites, likely secondary to fluid overload, results for specimen pending, suspecting transudative  - will hold on diuressis given kidney function, nephro to follow, patient hypotensive on admission started on levophed  - monitor in micu  - labs to follow, cbc/cmp/trops    # RIC on CKD stage 4, creatinine baseline 3   - urine lytes to follow  - us renal to follow  - nephro dr ambrose consulted  - likely some aspect cardiorenal given chf     # DM2  - blood glucose monitoring  - insulin regimen started    # Stomach Ca, bladder prolapse  - chronic issues, follow up outpatient    Activity: as tolerated  diet: Nephro/fluid restriction  dvt ppx: heparin 5k tid  gi ppx: on ppi  full code  dispo: from home.

## 2020-05-12 NOTE — H&P ADULT - NSICDXFAMILYHX_GEN_ALL_CORE_FT
FAMILY HISTORY:  Mother  Still living? Unknown  FH: myocardial infarction, Age at diagnosis: Age Unknown  FH: stomach cancer, Age at diagnosis: Age Unknown

## 2020-05-12 NOTE — H&P ADULT - HISTORY OF PRESENT ILLNESS
70 y/o female with pmh of DM, stomach ca, bladder prolapse, heart failure, s/p CABG 10/2019 presents complaining of abdominal distension for the past couple of days which has been worsening.  Patient states that her symptoms are similar to when she has urinary tract infections. She has had two other episodes of similar presenting symptoms since having an indwelling montejo catheter placed after her CABG (10/2019) as mentioned.  Patient was treated with cipro as outpt; reports decreased PO fluids since the symptoms began.  Patient denies fever/chills, abd pain, n/v/d, cough, chest pain, SOB.

## 2020-05-12 NOTE — CONSULT NOTE ADULT - SUBJECTIVE AND OBJECTIVE BOX
Patient is a 69y old  Female who presents with a chief complaint of sob (12 May 2020 00:57)      HPI:  68 y/o female with pmh of DM, stomach ca, bladder prolapse, heart failure, s/p CABG 10/2019 presents complaining of abdominal distension for the past couple of days which has been worsening.  Patient states that her symptoms are similar to when she has urinary tract infections. She has had two other episodes of similar presenting symptoms since having an indwelling montejo catheter placed after her CABG (10/2019) as mentioned.  Patient was treated with cipro as outpt; reports decreased PO fluids since the symptoms began.  Patient denies fever/chills, abd pain, n/v/d, cough, chest pain, SOB. (12 May 2020 00:57)      PAST MEDICAL & SURGICAL HISTORY:  Heart failure  Female bladder prolapse  Diabetes  S/P CABG (coronary artery bypass graft)  History of repair of hip fracture      SOCIAL HX:   Smoking     Former                     ETOH                            Other    FAMILY HISTORY:  FH: myocardial infarction (Mother)  FH: stomach cancer (Mother)  :  No known cardiovacular family hisotry     Review Of Systems:     All ROS are negative except per HPI       Allergies    No Known Allergies    Intolerances          PHYSICAL EXAM    ICU Vital Signs Last 24 Hrs  T(C): 36.1 (12 May 2020 01:20), Max: 36.6 (11 May 2020 17:50)  T(F): 97 (12 May 2020 01:20), Max: 97.9 (11 May 2020 17:50)  HR: 134 (12 May 2020 07:00) (112 - 134)  BP: 107/60 (12 May 2020 07:00) (60/44 - 868/54)  BP(mean): 86 (12 May 2020 07:00) (55 - 113)  ABP: --  ABP(mean): --  RR: 11 (12 May 2020 07:00) (0 - 27)  SpO2: 100% (12 May 2020 07:00) (88% - 100%)      CONSTITUTIONAL:   Ill appearing.  Well nourished.  NAD    ENT:   Airway patent,   Mouth with normal mucosa.   No thrush    EYES:   pupils equal,   round and reactive to light.    CARDIAC:   Tachycardic   Regular rhythm.    Heart sounds S1, S2.    edema      Vascular:   normal systolic impulse  no bruits    RESPIRATORY:   No wheezing   Normal chest expansion  No use of accessory muscles    GASTROINTESTINAL:  Abdomen soft   Non-tender,   No guarding,   + BS    GENITOURINARY  normal genitalia for sex  no edema    MUSCULOSKELETAL:   Range of motion is not limited,  Nno clubbing, cyanosis    NEUROLOGICAL:   Alert and oriented   No motor or sensory deficits.  Pertinent DTRs normal    SKIN:   Skin normal color for race,   Warm and dry  No evidence of rash.    PSYCHIATRIC:   Normal mood and affect.   No apparent risk to self or others.    HEME LYMPH:   No cervical  lymphadenopathy.  No inguinal lymphadenopathy            20 @ 07:01  -  20 @ 07:00  --------------------------------------------------------  IN:    norepinephrine Infusion: 11.4 mL    norepinephrine Infusion: 115.7 mL    Oral Fluid: 120 mL  Total IN: 247.1 mL    OUT:    Indwelling Catheter - Urethral: 30 mL    Voided: 30 mL  Total OUT: 60 mL    Total NET: 187.1 mL          LABS:                          11.8   7.77  )-----------( 168      ( 12 May 2020 04:14 )             39.6                                               05-12    141  |  115<H>  |  99<HH>  ----------------------------<  186<H>  5.0   |  11<L>  |  4.9<HH>    Ca    9.0      12 May 2020 04:14  Phos  6.0     05-12  Mg     2.2     -12    TPro  5.8<L>  /  Alb  3.8  /  TBili  <0.2  /  DBili  x   /  AST  16  /  ALT  6   /  AlkPhos  54  05-11      PT/INR - ( 11 May 2020 19:23 )   PT: 11.60 sec;   INR: 1.01 ratio         PTT - ( 11 May 2020 19:23 )  PTT:31.8 sec                                       Urinalysis Basic - ( 11 May 2020 22:30 )    Color: Yellow / Appearance: Turbid / S.005 / pH: x  Gluc: x / Ketone: Negative  / Bili: Negative / Urobili: <2 mg/dL   Blood: x / Protein: 100 mg/dL / Nitrite: Negative   Leuk Esterase: Large / RBC: 3 /HPF / WBC >720 /HPF   Sq Epi: x / Non Sq Epi: 3 /HPF / Bacteria: Moderate        CARDIAC MARKERS ( 12 May 2020 04:14 )  x     / 0.23 ng/mL / x     / x     / x      CARDIAC MARKERS ( 11 May 2020 19:23 )  x     / 0.23 ng/mL / x     / x     / x                                                LIVER FUNCTIONS - ( 11 May 2020 19:23 )  Alb: 3.8 g/dL / Pro: 5.8 g/dL / ALK PHOS: 54 U/L / ALT: 6 U/L / AST: 16 U/L / GGT: x                                                                                                                                       X-Rays reviewed:                                                                                    ECHO    CXR interpreted by me: Left pleural effusion     MEDICATIONS  (STANDING):  ascorbic acid 500 milliGRAM(s) Oral daily  aspirin  chewable 81 milliGRAM(s) Oral daily  atorvastatin 40 milliGRAM(s) Oral at bedtime  carvedilol Oral Tab/Cap - Peds 9.375 milliGRAM(s) Oral every 12 hours  chlorhexidine 4% Liquid 1 Application(s) Topical <User Schedule>  dextrose 5%. 1000 milliLiter(s) (50 mL/Hr) IV Continuous <Continuous>  dextrose 50% Injectable 12.5 Gram(s) IV Push once  dextrose 50% Injectable 25 Gram(s) IV Push once  dextrose 50% Injectable 25 Gram(s) IV Push once  heparin   Injectable 5000 Unit(s) SubCutaneous every 8 hours  hydrALAZINE 10 milliGRAM(s) Oral every 8 hours  insulin glargine Injectable (LANTUS) 15 Unit(s) SubCutaneous every morning  insulin lispro Injectable (HumaLOG) 5 Unit(s) SubCutaneous three times a day before meals  isosorbide   dinitrate Tablet (ISORDIL) 5 milliGRAM(s) Oral three times a day  lactobacillus acidophilus 1 Tablet(s) Oral three times a day  norepinephrine Infusion 0.05 MICROgram(s)/kG/Min (2.9 mL/Hr) IV Continuous <Continuous>  pantoprazole    Tablet 40 milliGRAM(s) Oral before breakfast  sertraline 25 milliGRAM(s) Oral daily  tiotropium 18 MICROgram(s) Capsule 1 Capsule(s) Inhalation daily    MEDICATIONS  (PRN):  acetaminophen   Tablet .. 650 milliGRAM(s) Oral every 6 hours PRN Moderate Pain (4 - 6)  dextrose 40% Gel 15 Gram(s) Oral once PRN Blood Glucose LESS THAN 70 milliGRAM(s)/deciliter  glucagon  Injectable 1 milliGRAM(s) IntraMuscular once PRN Glucose LESS THAN 70 milligrams/deciliter

## 2020-05-13 PROBLEM — E87.5 HYPERKALEMIA: Status: ACTIVE | Noted: 2020-01-17

## 2020-05-13 NOTE — CONSULT NOTE ADULT - ASSESSMENT
69 female with DM, CAD s/p CABG 10/19, ischemic CMP, Mod-severe MR, PAF/aflutter (no AC due to GI bleed), recurrent UTI presenting with lower abdominal distention.   Found to be hypotensive and tachycardic. EKG showing a-flutter with 2:1 AV block. She was started on Levophed and admitted to ICU.   To note patient was discharged in Nov. 2019 with a LifeVest (for primary prevention) but her EF improved on an outpatient echo so AICD implantation was not pursued.    Paroxysmal a-fib/flutter  CAD S/P CABG (coronary artery bypass graft)  Ischemic CMP- EF dropped again to 20-30%   HFrEF  NSTEMI  Diabetes    Recommendations:   c/w vasopressor support  c/w amiodarone gtt  Start anti-coagulation if cleared by GI   Resume GDMT once hemodynamically stable  JAMILA/DCCV   May need to be discharged with a LifeVest

## 2020-05-13 NOTE — CHART NOTE - NSCHARTNOTEFT_GEN_A_CORE
Cardiac fellow called. After reviewing images it is concluded that the EF is 20-30% and was misinterpreted to be 60% on the 2D-echo.

## 2020-05-13 NOTE — CONSULT NOTE ADULT - PROBLEM SELECTOR RECOMMENDATION 9
Likely mixed shock in the context of recurrent urine infection and dropped LV systolic function   Continue pressor support with norepinephrine and vasopressin   No need for fluid resuscitation as patient is overloaded  She maintains adequate mentation   Normal lactic acid today   Monitor strict I/Os   Continue abx and management of heart failure

## 2020-05-13 NOTE — PROGRESS NOTE ADULT - SUBJECTIVE AND OBJECTIVE BOX
[facility] FOLLOW UP NOTE  --------------------------------------------------------------------------------  24 hour events/subjective: Patient examined in ICU. Appears comfortable.    PAST HISTORY  --------------------------------------------------------------------------------  No significant changes to PMH, PSH, FHx, SHx, unless otherwise noted    ALLERGIES & MEDICATIONS  --------------------------------------------------------------------------------  Allergies    No Known Allergies    Standing Inpatient Medications  aMIOdarone Infusion 1 mG/Min IV Continuous <Continuous>  aMIOdarone IVPB 150 milliGRAM(s) IV Intermittent once  ascorbic acid 500 milliGRAM(s) Oral daily  aspirin  chewable 81 milliGRAM(s) Oral daily  atorvastatin 40 milliGRAM(s) Oral at bedtime  cefTRIAXone   IVPB      chlorhexidine 4% Liquid 1 Application(s) Topical <User Schedule>  dextrose 5%. 1000 milliLiter(s) IV Continuous <Continuous>  dextrose 50% Injectable 12.5 Gram(s) IV Push once  dextrose 50% Injectable 25 Gram(s) IV Push once  dextrose 50% Injectable 25 Gram(s) IV Push once  heparin   Injectable 5000 Unit(s) SubCutaneous every 8 hours  insulin glargine Injectable (LANTUS) 15 Unit(s) SubCutaneous every morning  insulin lispro Injectable (HumaLOG) 5 Unit(s) SubCutaneous three times a day before meals  lactobacillus acidophilus 1 Tablet(s) Oral three times a day  metoprolol tartrate 12.5 milliGRAM(s) Oral every 6 hours  pantoprazole    Tablet 40 milliGRAM(s) Oral before breakfast  senna 2 Tablet(s) Oral at bedtime  sertraline 25 milliGRAM(s) Oral daily  sodium bicarbonate 650 milliGRAM(s) Oral every 8 hours  tiotropium 18 MICROgram(s) Capsule 1 Capsule(s) Inhalation daily  vasopressin Infusion 0.1 Unit(s)/Min IV Continuous <Continuous>    PRN Inpatient Medications  acetaminophen   Tablet .. 650 milliGRAM(s) Oral every 6 hours PRN  dextrose 40% Gel 15 Gram(s) Oral once PRN  glucagon  Injectable 1 milliGRAM(s) IntraMuscular once PRN      VITALS/PHYSICAL EXAM  --------------------------------------------------------------------------------  T(C): 36.6 (05-13-20 @ 08:00), Max: 36.6 (05-13-20 @ 08:00)  HR: 144 (05-13-20 @ 10:30) (132 - 144)  BP: 96/62 (05-13-20 @ 10:30) (67/46 - 111/68)  RR: 18 (05-13-20 @ 10:30) (0 - 25)  SpO2: 96% (05-13-20 @ 10:30) (89% - 100%)  Wt(kg): --  Height (cm): 157.5 (05-12-20 @ 01:20)  Weight (kg): 61.2 (05-12-20 @ 08:00)  BMI (kg/m2): 24.7 (05-12-20 @ 08:00)  BSA (m2): 1.62 (05-12-20 @ 08:00)      05-12-20 @ 07:01  -  05-13-20 @ 07:00  --------------------------------------------------------  IN: 1172 mL / OUT: 55 mL / NET: 1117 mL    05-13-20 @ 07:01  -  05-13-20 @ 12:08  --------------------------------------------------------  IN: 50 mL / OUT: 60 mL / NET: -10 mL      Physical Exam:  	Gen: NAD  	Pulm: CTA B/L  	CV: RRR, S1S2  	Abd: +BS, soft, nontender/nondistended  	: No suprapubic tenderness  	LE: Warm, edema    LABS/STUDIES  --------------------------------------------------------------------------------              11.4   5.32  >-----------<  124      [05-13-20 @ 03:40]              35.6     141  |  111  |  100  ----------------------------<  112      [05-13-20 @ 03:40]  4.7   |  13  |  5.3        Ca     8.7     [05-13-20 @ 03:40]      Mg     2.2     [05-12-20 @ 04:14]      Phos  6.0     [05-12-20 @ 04:14]    TPro  5.2  /  Alb  3.5  /  TBili  <0.2  /  DBili  x   /  AST  7   /  ALT  <5  /  AlkPhos  54  [05-13-20 @ 03:40]    PT/INR: PT 11.60, INR 1.01       [05-11-20 @ 19:23]  PTT: 31.8       [05-11-20 @ 19:23]    Troponin 0.25      [05-12-20 @ 11:00]    Creatinine Trend:  SCr 5.3 [05-13 @ 03:40]  SCr 4.9 [05-12 @ 04:14]  SCr 4.9 [05-11 @ 19:23]    Urinalysis - [05-11-20 @ 22:30]      Color Yellow / Appearance Turbid / SG 1.005 / pH 7.0      Gluc Negative / Ketone Negative  / Bili Negative / Urobili <2 mg/dL       Blood Moderate / Protein 100 mg/dL / Leuk Est Large / Nitrite Negative      RBC 3 / WBC >720 / Hyaline 8 / Gran  / Sq Epi  / Non Sq Epi 3 / Bacteria Moderate    Urine Creatinine 102      [05-12-20 @ 05:50]  Urine Protein 465      [05-12-20 @ 05:50]  Urine Sodium 59.0      [05-12-20 @ 05:50]  Urine Urea Nitrogen 215      [05-12-20 @ 05:50]  Urine Phosphorus 11      [05-12-20 @ 05:50]  Urine Osmolality 319      [05-12-20 @ 05:50]    Iron 24, TIBC 204, %sat 12      [08-30-19 @ 07:16]  Ferritin 112      [08-30-19 @ 07:16]  HbA1c 5.7      [11-09-19 @ 01:20]  TSH 0.58      [08-02-19 @ 18:37]  Lipid: chol 153, , HDL 45, LDL 75      [08-30-19 @ 07:16]

## 2020-05-13 NOTE — CONSULT NOTE ADULT - PROBLEM SELECTOR RECOMMENDATION 2
Likely secondary to atrial flutter  Her LVEF had recovered to ~45% and was found to drop to 30-35% on echo from yesterday.   Today POC US shows LVEF 15-20% with dilated IVC   She received a challenge dose of furosemide of 180 mg iv push by ICU team   Continue pressor support   Hold any further diuretics until renal  function plateaus as she is not responding to diuretics  She might need HD   Discussed with ICU team and family at bedside

## 2020-05-13 NOTE — HISTORY OF PRESENT ILLNESS
[Verbal consent obtained from patient] : the patient, [unfilled] [FreeTextEntry1] : 68 y/o F with h/o CAD s/p CABG, ICM, chronic systolic HF called today after creatinine was found to be at 3.0 with BUN 88. Patient reports feeling better. She denies any SOB, nausea, vomiting. She is compliant with her medications and low salt diet.

## 2020-05-13 NOTE — PROGRESS NOTE ADULT - SUBJECTIVE AND OBJECTIVE BOX
LENGTH OF HOSPITAL STAY: 2d    CHIEF COMPLAINT:   Patient is a 69y old  Female who presents with a chief complaint of sob (13 May 2020 07:51)      HISTORY OF PRESENTING ILLNESS:    HPI:  70 y/o female with pmh of DM, stomach ca, bladder prolapse, heart failure, s/p CABG 10/2019 presents complaining of abdominal distension for the past couple of days which has been worsening.  Patient states that her symptoms are similar to when she has urinary tract infections. She has had two other episodes of similar presenting symptoms since having an indwelling montejo catheter placed after her CABG (10/2019) as mentioned.  Patient was treated with cipro as outpt; reports decreased PO fluids since the symptoms began.  Patient denies fever/chills, abd pain, n/v/d, cough, chest pain, SOB. (12 May 2020 00:57)    PAST MEDICAL & SURGICAL HISTORY  PAST MEDICAL & SURGICAL HISTORY:  Heart failure  Female bladder prolapse  Diabetes  S/P CABG (coronary artery bypass graft)  History of repair of hip fracture    SOCIAL HISTORY:    ALLERGIES:  No Known Allergies    MEDICATIONS:  STANDING MEDICATIONS  ascorbic acid 500 milliGRAM(s) Oral daily  aspirin  chewable 81 milliGRAM(s) Oral daily  atorvastatin 40 milliGRAM(s) Oral at bedtime  cefTRIAXone   IVPB      chlorhexidine 4% Liquid 1 Application(s) Topical <User Schedule>  dextrose 5%. 1000 milliLiter(s) IV Continuous <Continuous>  dextrose 50% Injectable 12.5 Gram(s) IV Push once  dextrose 50% Injectable 25 Gram(s) IV Push once  dextrose 50% Injectable 25 Gram(s) IV Push once  heparin   Injectable 5000 Unit(s) SubCutaneous every 8 hours  insulin glargine Injectable (LANTUS) 15 Unit(s) SubCutaneous every morning  insulin lispro Injectable (HumaLOG) 5 Unit(s) SubCutaneous three times a day before meals  lactobacillus acidophilus 1 Tablet(s) Oral three times a day  pantoprazole    Tablet 40 milliGRAM(s) Oral before breakfast  senna 2 Tablet(s) Oral at bedtime  sertraline 25 milliGRAM(s) Oral daily  sodium bicarbonate 650 milliGRAM(s) Oral every 8 hours  tiotropium 18 MICROgram(s) Capsule 1 Capsule(s) Inhalation daily  vasopressin Infusion 0.1 Unit(s)/Min IV Continuous <Continuous>    PRN MEDICATIONS  acetaminophen   Tablet .. 650 milliGRAM(s) Oral every 6 hours PRN  dextrose 40% Gel 15 Gram(s) Oral once PRN  glucagon  Injectable 1 milliGRAM(s) IntraMuscular once PRN    VITALS:   T(F): 97.8  HR: 144  BP: 83/54  RR: 16  SpO2: 96%    LABS:                        11.4   5.32  )-----------( 124      ( 13 May 2020 03:40 )             35.6     05-13    141  |  111<H>  |  100<HH>  ----------------------------<  112<H>  4.7   |  13<L>  |  5.3<HH>    Ca    8.7      13 May 2020 03:40  Phos  6.0     05-12  Mg     2.2     05-12    TPro  5.2<L>  /  Alb  3.5  /  TBili  <0.2  /  DBili  x   /  AST  7   /  ALT  <5  /  AlkPhos  54  05-13    PT/INR - ( 11 May 2020 19:23 )   PT: 11.60 sec;   INR: 1.01 ratio         PTT - ( 11 May 2020 19:23 )  PTT:31.8 sec  Urinalysis Basic - ( 11 May 2020 22:30 )    Color: Yellow / Appearance: Turbid / S.005 / pH: x  Gluc: x / Ketone: Negative  / Bili: Negative / Urobili: <2 mg/dL   Blood: x / Protein: 100 mg/dL / Nitrite: Negative   Leuk Esterase: Large / RBC: 3 /HPF / WBC >720 /HPF   Sq Epi: x / Non Sq Epi: 3 /HPF / Bacteria: Moderate      ABG - ( 13 May 2020 04:48 )  pH, Arterial: 7.14  pH, Blood: x     /  pCO2: 35    /  pO2: 74    / HCO3: 12    / Base Excess: -16.1 /  SaO2: 95                Troponin T, Serum: 0.25 ng/mL <HH> (20 @ 11:00)      Culture - Urine (collected 11 May 2020 22:30)  Source: .Urine Clean Catch (Midstream)  Preliminary Report (12 May 2020 21:55):    >100,000 CFU/ml Staphylococcus aureus    Culture - Fungal, Body Fluid (collected 11 May 2020 22:22)  Source: .Body Fluid Pleural Fluid  Preliminary Report (13 May 2020 08:00):    Testing in progress    Culture - Body Fluid with Gram Stain (collected 11 May 2020 22:22)  Source: .Body Fluid Pleural Fluid  Gram Stain (12 May 2020 15:32):    Moderate polymorphonuclear leukocytes seen per low power field    No organisms seen per oil power field    by cytocentrifuge    Culture - Blood (collected 11 May 2020 19:23)  Source: .Blood Blood-Peripheral  Preliminary Report (13 May 2020 01:01):    No growth to date.    Culture - Blood (collected 11 May 2020 19:23)  Source: .Blood Blood-Peripheral  Preliminary Report (13 May 2020 01:01):    No growth to date.      CARDIAC MARKERS ( 12 May 2020 11:00 )  x     / 0.25 ng/mL / x     / x     / x      CARDIAC MARKERS ( 12 May 2020 04:14 )  x     / 0.23 ng/mL / x     / x     / x      CARDIAC MARKERS ( 11 May 2020 19:23 )  x     / 0.23 ng/mL / x     / x     / x          RADIOLOGY:  < from: Xray Chest 1 View- PORTABLE-Routine (20 @ 05:55) >  Impression:      Cardiomegaly. CHF. Support devices as described. Accounting for technique, stable.    < end of copied text >  < from: US Renal (20 @ 06:28) >  IMPRESSION:    Nonspecific heterogeneous echotexture to the right kidney. When the patient's current symptoms improve, repeat renal ultrasound is recommended.    No hydronephrosis.    Nonspecific echogenic material within a collapsed bladder. A bladder ultrasound is recommended when the bladder can be fully distended.    Large amount of ascites.      < end of copied text >    < from: CT Abdomen and Pelvis No Cont (20 @ 23:10) >    IMPRESSION:      1.  Distended gallbladder with layering calculous sediment.  2.  Large abdominopelvic ascites.  3.  Partially imaged left pleural effusion and left lower lobe atelectasis, lobulated trace right pleural effusion.  4.  Marked cardiomegaly.  5.  Anasarca and generalized mesenteric edema.  6.  No evidence of obstructive uropathy.          < end of copied text >  PHYSICAL EXAM:  GEN: No acute distress  HEENT: AT, NC, PERRLA  LUNGS: decreased breath sounds over Left lung  HEART: S1/S2 present. RRR.   ABD: Soft, non-tender, non-distended. Bowel sounds present  EXT: edema 2+ , cyanosis, clubbing absent, ROM normal  NEURO: AAOX3

## 2020-05-13 NOTE — PROGRESS NOTE ADULT - SUBJECTIVE AND OBJECTIVE BOX
Patient is a 69y old  Female who presents with a chief complaint of sob (12 May 2020 11:59)        Over Night Events: Did well overnight.  On Levophed.  On RA.          ROS:     All ROS are negative except HPI         PHYSICAL EXAM    ICU Vital Signs Last 24 Hrs  T(C): 36.2 (12 May 2020 20:00), Max: 36.6 (12 May 2020 08:00)  T(F): 97.1 (12 May 2020 20:00), Max: 97.9 (12 May 2020 08:00)  HR: 142 (13 May 2020 07:00) (132 - 142)  BP: 85/61 (13 May 2020 07:00) (67/46 - 137/68)  BP(mean): 66 (13 May 2020 07:00) (52 - 95)  ABP: --  ABP(mean): --  RR: 19 (13 May 2020 07:00) (0 - 25)  SpO2: 97% (13 May 2020 07:00) (94% - 100%)      CONSTITUTIONAL:   Well nourished.  NAD    ENT:   Airway patent,   Mouth with normal mucosa.   No thrush    EYES:   Pupils equal,   Round and reactive to light.    CARDIAC:   Tachycardic   Regular rhythm.    No edema      Vascular:  Normal systolic impulse  No Carotid bruits    RESPIRATORY:   No wheezing  Bilateral BS  Normal chest expansion  Not tachypneic,  No use of accessory muscles    GASTROINTESTINAL:  Abdomen soft,   Non-tender,   No guarding,   + BS    GENITOURINARY  normal genitalia for sex  no edema    MUSCULOSKELETAL:   Range of motion is not limited,  No clubbing, cyanosis    NEUROLOGICAL:   Alert and oriented   No motor  deficits.      SKIN:   Skin normal color for race,   Warm and dry and intact.   No evidence of rash.    PSYCHIATRIC:   Normal mood and affect.   No apparent risk to self or others.    HEMATOLOGICAL:  No cervical  lymphadenopathy.  no inguinal lymphadenopathy      20 @ 07:01  -  20 @ 07:00  --------------------------------------------------------  IN:    DOBUTamine Infusion: 27.6 mL    IV PiggyBack: 300 mL    norepinephrine Infusion: 88.7 mL    norepinephrine Infusion: 40.7 mL    Oral Fluid: 715 mL  Total IN: 1172 mL    OUT:    Indwelling Catheter - Urethral: 55 mL  Total OUT: 55 mL    Total NET: 1117 mL          LABS:                            11.4   5.32  )-----------( 124      ( 13 May 2020 03:40 )             35.6                                               05-13    141  |  111<H>  |  100<HH>  ----------------------------<  112<H>  4.7   |  13<L>  |  5.3<HH>    13 May 2020 03:40    141    |  111<H>  |  100<HH>  ----------------------------<  112<H>  4.7     |  13<L>  |  5.3<HH>  12 May 2020 04:14    141    |  115<H>  |  99<HH>  ----------------------------<  186<H>  5.0     |  11<L>  |  4.9<HH>    Ca    8.7        13 May 2020 03:40  Ca    9.0        12 May 2020 04:14  Phos  6.0<H>     12 May 2020 04:14  Mg     2.2       12 May 2020 04:14  Mg     2.3       11 May 2020 19:23    TPro  5.2<L>  /  Alb  3.5    /  TBili  <0.2   /  DBili  x      /  AST  7      /  ALT  <5     /  AlkPhos  54     13 May 2020 03:40  TPro  5.8<L>  /  Alb  3.8    /  TBili  <0.2   /  DBili  x      /  AST  16     /  ALT  6      /  AlkPhos  54     11 May 2020 19:23      Ca    8.7      13 May 2020 03:40  Phos  6.0     05-12  Mg     2.2     05-12    TPro  5.2<L>  /  Alb  3.5  /  TBili  <0.2  /  DBili  x   /  AST  7   /  ALT  <5  /  AlkPhos  54  05-13      PT/INR - ( 11 May 2020 19:23 )   PT: 11.60 sec;   INR: 1.01 ratio         PTT - ( 11 May 2020 19:23 )  PTT:31.8 sec                                       Urinalysis Basic - ( 11 May 2020 22:30 )    Color: Yellow / Appearance: Turbid / S.005 / pH: x  Gluc: x / Ketone: Negative  / Bili: Negative / Urobili: <2 mg/dL   Blood: x / Protein: 100 mg/dL / Nitrite: Negative   Leuk Esterase: Large / RBC: 3 /HPF / WBC >720 /HPF   Sq Epi: x / Non Sq Epi: 3 /HPF / Bacteria: Moderate        CARDIAC MARKERS ( 12 May 2020 11:00 )  x     / 0.25 ng/mL / x     / x     / x      CARDIAC MARKERS ( 12 May 2020 04:14 )  x     / 0.23 ng/mL / x     / x     / x      CARDIAC MARKERS ( 11 May 2020 19:23 )  x     / 0.23 ng/mL / x     / x     / x                                                LIVER FUNCTIONS - ( 13 May 2020 03:40 )  Alb: 3.5 g/dL / Pro: 5.2 g/dL / ALK PHOS: 54 U/L / ALT: <5 U/L / AST: 7 U/L / GGT: x                                                  Culture - Urine (collected 11 May 2020 22:30)  Source: .Urine Clean Catch (Midstream)  Preliminary Report (12 May 2020 21:55):    >100,000 CFU/ml Staphylococcus aureus    Culture - Body Fluid with Gram Stain (collected 11 May 2020 22:22)  Source: .Body Fluid Pleural Fluid  Gram Stain (12 May 2020 15:32):    Moderate polymorphonuclear leukocytes seen per low power field    No organisms seen per oil power field    by cytocentrifuge    Culture - Blood (collected 11 May 2020 19:23)  Source: .Blood Blood-Peripheral  Preliminary Report (13 May 2020 01:01):    No growth to date.    Culture - Blood (collected 11 May 2020 19:23)  Source: .Blood Blood-Peripheral  Preliminary Report (13 May 2020 01:01):    No growth to date.                                                                                       ABG - ( 13 May 2020 04:48 )  pH, Arterial: 7.14  pH, Blood: x     /  pCO2: 35    /  pO2: 74    / HCO3: 12    / Base Excess: -16.1 /  SaO2: 95                  MEDICATIONS  (STANDING):  ascorbic acid 500 milliGRAM(s) Oral daily  aspirin  chewable 81 milliGRAM(s) Oral daily  atorvastatin 40 milliGRAM(s) Oral at bedtime  chlorhexidine 4% Liquid 1 Application(s) Topical <User Schedule>  dextrose 5%. 1000 milliLiter(s) (50 mL/Hr) IV Continuous <Continuous>  dextrose 50% Injectable 12.5 Gram(s) IV Push once  dextrose 50% Injectable 25 Gram(s) IV Push once  dextrose 50% Injectable 25 Gram(s) IV Push once  heparin   Injectable 5000 Unit(s) SubCutaneous every 8 hours  insulin glargine Injectable (LANTUS) 15 Unit(s) SubCutaneous every morning  insulin lispro Injectable (HumaLOG) 5 Unit(s) SubCutaneous three times a day before meals  lactobacillus acidophilus 1 Tablet(s) Oral three times a day  norepinephrine Infusion 0.05 MICROgram(s)/kG/Min (2.87 mL/Hr) IV Continuous <Continuous>  pantoprazole    Tablet 40 milliGRAM(s) Oral before breakfast  senna 2 Tablet(s) Oral at bedtime  sertraline 25 milliGRAM(s) Oral daily  sodium bicarbonate 650 milliGRAM(s) Oral every 8 hours  tiotropium 18 MICROgram(s) Capsule 1 Capsule(s) Inhalation daily  vancomycin  IVPB        MEDICATIONS  (PRN):  acetaminophen   Tablet .. 650 milliGRAM(s) Oral every 6 hours PRN Moderate Pain (4 - 6)  dextrose 40% Gel 15 Gram(s) Oral once PRN Blood Glucose LESS THAN 70 milliGRAM(s)/deciliter  glucagon  Injectable 1 milliGRAM(s) IntraMuscular once PRN Glucose LESS THAN 70 milligrams/deciliter      New X-rays reviewed:                                                                                  ECHO    CXR interpreted by me:  Left pleural effusion.  pulmonary vascular congestion

## 2020-05-13 NOTE — CONSULT NOTE ADULT - PROBLEM SELECTOR RECOMMENDATION 3
Rate 130-140   Give amiodarone 150 mg bolus and start gtt at 1 mg/hr for 6 hrs followed by 0.5 mg/hr for 18 hrs  Start metoprolol tartrate 12.5 mg q 6hrs   Hold diuretics given h/o GI bleeding (she had an appt with GI this week for EGD/colonoscopy)   Her NUBIA was clipped during CABG   Will consider DCCV without anticoagulation   Get EP consult

## 2020-05-13 NOTE — CONSULT NOTE ADULT - SUBJECTIVE AND OBJECTIVE BOX
Patient is a 69y old  Female who presents with a chief complaint of sob (13 May 2020 12:08)    HPI:  68 y/o female with pmh of DM, stomach ca, bladder prolapse, heart failure, s/p CABG 10/2019 presents complaining of abdominal distension for the past couple of days which has been worsening.  Patient states that her symptoms are similar to when she has urinary tract infections. She has had two other episodes of similar presenting symptoms since having an indwelling montejo catheter placed after her CABG (10/2019) as mentioned.  Patient was treated with cipro as outpt; reports decreased PO fluids since the symptoms began.  Patient denies fever/chills, abd pain, n/v/d, cough, chest pain, SOB.     EP:   69 female with DM, CAD s/p CABG 10/19, ischemic CMP, Mod-severe MR, PAF/aflutter (no AC due to GI bleed), recurrent UTI presenting with lower abdominal distention.   Found to be hypotensive and tachycardic. EKG showing a-flutter with 2:1 AV block. She was started on Levophed and admitted to ICU.   To note patient was discharged in 2019 with a LifeVest (for primary prevention) but her EF improved on an outpatient echo so AICD implantation was not pursued.  EP is consulted to evaluate a-flutter.        PAST MEDICAL & SURGICAL HISTORY:  Heart failure  Female bladder prolapse  Diabetes  S/P CABG (coronary artery bypass graft)  History of repair of hip fracture          PREVIOUS DIAGNOSTIC TESTING:      ECHO  FINDINGS:  < from: Transthoracic Echocardiogram (20 @ 09:24) >  Summary:   1. Mildly decreased segmental left ventricular systolic function.   2. Normal global left ventricular systolic function.   3. Mid and apical anterior septum, mid and apical inferior septum, and mid and apical inferior wall are abnormal as described above.  4. LV Ejection Fraction by Renner's Method with a biplane EF of 60 %.   5. Mildly increased LV wall thickness.   6. Normal left ventricular internal cavity size.   7. Normal right atrial size.   8. There is no evidence of pericardial effusion.   9. Mild mitral annular calcification.  10. Mild thickening and calcification of the anterior and posterior mitral valve leaflets.  11. Moderate mitral valve regurgitation.  12. Moderate tricuspid regurgitation.  13. Peak transaortic gradient equals 28.7 mmHg, mean transaortic gradient equals 16.0 mmHg, the calculated aortic valve area equals 0.79 cm² by the continuity equation consistent with severe aortic stenosis.    < end of copied text >      < from: Transthoracic Echocardiogram (19 @ 07:18) >    Summary:   1. Severely decreased global left ventricular systolic function.   2. LV Ejection Fraction by Renner's Method with a biplane EF of 27 %.   3. Increased LV wall thickness.   4. Normal left ventricular internal cavity size.   5. Normal right atrial size.   6. Small pericardial effusion.   7. Mild thickening and calcification of the anterior and posterior   mitral valve leaflets.   8. Mild to moderate mitral annular calcification.   9. Moderate aortic valve stenosis.    < end of copied text >    STRESS  FINDINGS:    CATHETERIZATION  FINDINGS:  CATH SUMMARY/FINDINGS:    Dominance:   [x] Right  [ ] Left                  LM:    ostial LM: 85% stenosis    LAD:                        prox LAD: minor irregularities  mid LAD: 60% tubular stenosis   distal LAD: minor irregularities    Diag:   D1: meidum sized, minor irregularities  D2: small sized, mild atherosclerosis    Cx:  prox LCX: mild atherosclerosis  mid LCX: 90% stenosis just after OM1  distal LCX: small sized, minor irregularities    OM:  OM1: large sized vessel, 80% stenosis in the proximal third of the vessel    RCA:  prox RCA: minor irregularities  mid RCA: 95% stenosis  distal RCA: mild atherosclerosis    RPDA: mild atherosclerosis      CXR:  < from: Xray Chest 1 View- PORTABLE-Routine (20 @ 05:55) >    Impression:      Cardiomegaly. CHF. Support devices as described. Accounting for technique, stable.    < end of copied text >      MEDICATIONS  (STANDING):  aMIOdarone Infusion 1 mG/Min (33.3 mL/Hr) IV Continuous <Continuous>  ascorbic acid 500 milliGRAM(s) Oral daily  aspirin  chewable 81 milliGRAM(s) Oral daily  atorvastatin 40 milliGRAM(s) Oral at bedtime  cefTRIAXone   IVPB      chlorhexidine 4% Liquid 1 Application(s) Topical <User Schedule>  dextrose 5%. 1000 milliLiter(s) (50 mL/Hr) IV Continuous <Continuous>  dextrose 50% Injectable 12.5 Gram(s) IV Push once  dextrose 50% Injectable 25 Gram(s) IV Push once  dextrose 50% Injectable 25 Gram(s) IV Push once  heparin   Injectable 5000 Unit(s) SubCutaneous every 8 hours  insulin glargine Injectable (LANTUS) 15 Unit(s) SubCutaneous every morning  insulin lispro Injectable (HumaLOG) 5 Unit(s) SubCutaneous three times a day before meals  lactobacillus acidophilus 1 Tablet(s) Oral three times a day  metoprolol tartrate 12.5 milliGRAM(s) Oral every 6 hours  pantoprazole    Tablet 40 milliGRAM(s) Oral before breakfast  senna 2 Tablet(s) Oral at bedtime  sertraline 25 milliGRAM(s) Oral daily  sodium bicarbonate 650 milliGRAM(s) Oral every 8 hours  tiotropium 18 MICROgram(s) Capsule 1 Capsule(s) Inhalation daily  vasopressin Infusion 0.1 Unit(s)/Min (6 mL/Hr) IV Continuous <Continuous>    MEDICATIONS  (PRN):  acetaminophen   Tablet .. 650 milliGRAM(s) Oral every 6 hours PRN Moderate Pain (4 - 6)  dextrose 40% Gel 15 Gram(s) Oral once PRN Blood Glucose LESS THAN 70 milliGRAM(s)/deciliter  glucagon  Injectable 1 milliGRAM(s) IntraMuscular once PRN Glucose LESS THAN 70 milligrams/deciliter      FAMILY HISTORY:  FH: myocardial infarction (Mother)  FH: stomach cancer (Mother)      SOCIAL HISTORY:    CIGARETTES:  neg.  ALCOHOL:  neg.    Allergies:    No Known Allergies      REVIEW OF SYSTEMS:    CONSTITUTIONAL: see HPI  EYES: No eye pain, visual disturbances, or discharge  ENMT:  No difficulty hearing, tinnitus, vertigo; No sinus or throat pain  NECK: No pain or stiffness  BREASTS: No pain, masses, or nipple discharge  RESPIRATORY: see HPI  CARDIOVASCULAR: see HPI  GASTROINTESTINAL: No abdominal  or epigastric pain, nausea, vomiting, hematemesis, diarrhea, constipation, melena or bright red blood.  GENITOURINARY: recurrent UTI  NEUROLOGICAL: No headaches, memory loss, slurred speech, limb weakness, loss of strength, numbness, or tremors  SKIN: No itching, burning, rashes, or lesions   LYMPH NODES: No enlarged glands  ENDOCRINE: No heat or cold intolerance, or hair loss  MUSCULOSKELETAL: No joint pain or swelling, muscle, back, or extremity pain  PSYCHIATRIC: No depression, anxiety, or difficulty sleeping  HEME/LYMPH: No easy bruising or bleeding gums  ALLERY AND IMMUNOLOGIC: No hives or rash.      Vital Signs Last 24 Hrs  T(C): 36.6 (13 May 2020 08:00), Max: 36.6 (13 May 2020 08:00)  T(F): 97.8 (13 May 2020 08:00), Max: 97.8 (13 May 2020 08:00)  HR: 132 (13 May 2020 12:45) (130 - 146)  BP: 77/57 (13 May 2020 12:45) (67/46 - 121/83)  BP(mean): 63 (13 May 2020 12:45) (53 - 97)  RR: 19 (13 May 2020 12:45) (11 - 26)  SpO2: 96% (13 May 2020 12:45) (89% - 100%)    PHYSICAL EXAM:        GENERAL: In no apparent distress, well nourished, and hydrated.  HEAD:  Atraumatic, Normocephalic  EYES: EOMI, PERRLA, conjunctiva and sclera clear  ENMT: No tonsillar erythema, exudates, or enlargements, Good dentition, No lesions  NECK: Supple and normal thyroid.  + JVD or carotid bruit.  Carotid pulse is 2+ bilaterally.  HEART: Regular rate and rhythm; KIKI at the RSB, rubs, or gallops.  PULMONARY: bilateral rales  ABDOMEN: Soft, Nontender, Nondistended; Bowel sounds present  EXTREMITIES:  2+ Peripheral Pulses, No clubbing, cyanosis, or edema  LYMPH: No lymphadenopathy noted  NEUROLOGICAL: Grossly nonfocal      INTERPRETATION OF TELEMETRY: A-flutter with 2:1 AV block     ECG: A-flutter at 136 bpm    I&O's Detail    12 May 2020 07:  -  13 May 2020 07:00  --------------------------------------------------------  IN:    DOBUTamine Infusion: 27.6 mL    IV PiggyBack: 300 mL    norepinephrine Infusion: 88.7 mL    norepinephrine Infusion: 40.7 mL    Oral Fluid: 715 mL  Total IN: 1172 mL    OUT:    Indwelling Catheter - Urethral: 55 mL  Total OUT: 55 mL    Total NET: 1117 mL      13 May 2020 07:01  -  13 May 2020 13:34  --------------------------------------------------------  IN:    IV PiggyBack: 150 mL    vasopressin Infusion: 4.8 mL  Total IN: 154.8 mL    OUT:    Indwelling Catheter - Urethral: 60 mL  Total OUT: 60 mL    Total NET: 94.8 mL          LABS:                        11.4   5.32  )-----------( 124      ( 13 May 2020 03:40 )             35.6     05-13    141  |  111<H>  |  100<HH>  ----------------------------<  112<H>  4.7   |  13<L>  |  5.3<HH>    Ca    8.7      13 May 2020 03:40  Phos  6.0     05-12  Mg     2.2     05-12    TPro  5.2<L>  /  Alb  3.5  /  TBili  <0.2  /  DBili  x   /  AST  7   /  ALT  <5  /  AlkPhos  54  05-13    CARDIAC MARKERS ( 12 May 2020 11:00 )  x     / 0.25 ng/mL / x     / x     / x      CARDIAC MARKERS ( 12 May 2020 04:14 )  x     / 0.23 ng/mL / x     / x     / x      CARDIAC MARKERS ( 11 May 2020 19:23 )  x     / 0.23 ng/mL / x     / x     / x          PT/INR - ( 11 May 2020 19:23 )   PT: 11.60 sec;   INR: 1.01 ratio         PTT - ( 11 May 2020 19:23 )  PTT:31.8 sec  Urinalysis Basic - ( 11 May 2020 22:30 )    Color: Yellow / Appearance: Turbid / S.005 / pH: x  Gluc: x / Ketone: Negative  / Bili: Negative / Urobili: <2 mg/dL   Blood: x / Protein: 100 mg/dL / Nitrite: Negative   Leuk Esterase: Large / RBC: 3 /HPF / WBC >720 /HPF   Sq Epi: x / Non Sq Epi: 3 /HPF / Bacteria: Moderate      BNP  I&O's Detail    12 May 2020 07:01  -  13 May 2020 07:00  --------------------------------------------------------  IN:    DOBUTamine Infusion: 27.6 mL    IV PiggyBack: 300 mL    norepinephrine Infusion: 88.7 mL    norepinephrine Infusion: 40.7 mL    Oral Fluid: 715 mL  Total IN: 1172 mL    OUT:    Indwelling Catheter - Urethral: 55 mL  Total OUT: 55 mL    Total NET: 1117 mL      13 May 2020 07:01  -  13 May 2020 13:34  --------------------------------------------------------  IN:    IV PiggyBack: 150 mL    vasopressin Infusion: 4.8 mL  Total IN: 154.8 mL    OUT:    Indwelling Catheter - Urethral: 60 mL  Total OUT: 60 mL    Total NET: 94.8 mL        Daily     Daily     RADIOLOGY & ADDITIONAL STUDIES:

## 2020-05-13 NOTE — PROGRESS NOTE ADULT - ASSESSMENT
68 y/o female with pmh of DM, stomach ca, bladder prolapse, heart failure, s/p CABG 10/2019 presents complaining of abdominal distension for the past couple of days which has been worsening.     Patient assessed this morning. Not in any acute distress. No significant events overnight.      # Acute Cardiogenic shock in setting of severe Aortic Stenosis :   - 2D-Echo reviewed, EF is 20-30% as per cardiac fellow.  - Severe aortic stenosis as per recent echo. According to patient EF was 50% on lates echo 2 months ago.  - [place] 30[address], cardiac enzymes elevated, repeat levels are stable  - Patient is hypotensive, requiring pressors. Will start Vaso and try to wean off Levophed.  - EKG shows sinus tachy, HR ~ 130s  - Holding HF meds for now - carvedilol, Valtessa, Ivabradine, Enteresto  - Cardiology consult, Herat Failure consult pending.    # RIC on CKD stage 4, creatinine baseline 3  secondary to ATN and cardiogenic shock  - SCr 5.3, baseline ~ 0.9  - patient noted decreased urine output over past week  - anuria, producing 20-30 cc of urine overnight  - did not respond to lasix 60 mg x 2   - will do a lasix challenge today, 180 mg lasix over 30 min  - UA noted for Eosinophils, patient was on cipro before admission for UTI, possible ATN  - FeNa > 1%  - Also US abdomen showed echogenic material around montejo cath in U. bladder, will need repeat scan with distended bladder once pt is stable enough  - Nephro Recs appreciated, patient started on PO bicarb    # UTI in setting of chronic Montejo :  - UA positive, UCx positive for Staph Aureus (pending sensitivities)  - ABx changed to Zyvox and Rocephin 5/13    # Rt Pleural effusion  - Thora done on admission, 300ml of fluid drained, looks transudative  - repeat CXR today shows fluid re-accumulating  - patient not in distress though    # H/O A- flutter  - patient was in A-flutter last week  - in sinus tachy right now    # H/O GI bleed (stable)  - patient had episode of lynn in October 2019, patient required blood transfusion at that time  - according to patient a colonoscopy was done and a "rectal ulcer " was found  - patient has been getting IV Iron infusion ever2-3 months since that time  -  # DM2  - blood glucose monitoring  - insulin regimen started    # Stomach Ca, bladder prolapse  - chronic issues, follow up outpatient    Activity: as tolerated  diet: Nephro/fluid restriction  dvt ppx: heparin 5k tid  gi ppx: on ppi  full code  dispo: from home.

## 2020-05-13 NOTE — PROGRESS NOTE ADULT - ASSESSMENT
1. RIC. ATN (hypotension) vs AIN (+urine eosinophils) vs cardiorenal (EF 20-30%, new). May have had bladder outlet obstruction on admission, renal sonogram showed collapsed bladder and no hydronephrosis. Likely has underlying CKD given long standing proteinuria. Monitor I/Os. Daily BMP. No urgent indication for dialysis but may need to initiate dialysis if urine output remains inadequate.   2. UTI/hypotension. Followup urine culture. On vanco/ceftriaxone. Trend vanco level.  3. HAGMA. Likely from kidney failure. Continue PO bicarb.

## 2020-05-13 NOTE — CONSULT NOTE ADULT - SUBJECTIVE AND OBJECTIVE BOX
70 y/o F with h/o DM, CAD s/p CABG, AF/flutter not on A/C due to GI bleeding pending work up (NUBIA clipped during CABG), ICM, chronic systolic heart failure, with LVEF 20% > 45% > 25% now, admitted from from with complaint of fatigue, low blood pressure. Patient was found to be hypotensive to the 70s upon arrive for which she was started on pressors and admitted to ICU. Her mental status has remained intact. Creat was  4.9 with BUN 99. Upon admission patient was in atrial flutter with rate 130-140s. An echocardiogram showed drop in LV systolic function to ~ 30-35%.       PMH:  DM, stomach ca, bladder prolapse, heart failure    PSH: CABG     Social: Denies smoking or ETOH         Review of systems     Constitutional: Poor appetite, Denies fever, chills   Eyes: No blurred vision   ENT: No ear discharge or sore throat   Respiratory: Denies cough, hemoptysis   Cardiac: Denies palpitations, Syncope, chest pain   GI: Denies nausea, vomiting  Hem/onc: Denies bleeding   : Denies dysuria  MSK: No back pain  Neuro: + weakness, No dizziness   Psychiatry: No anxiety        Physical exam     General: AAO x3, no acute distress   Eyes: Clear eyes   ENT: No ear discharge  Neck: Trachea is central, JVD +   Lungs: Decreased respiratory sounds on left side  Heart: tachycardic, regular heart sounds, no murmurs appreciated.    GI: Abdomen is soft, NT  Neuro: Normal strength  Psych: Calm affect   Integument: No skin bruises

## 2020-05-13 NOTE — CONSULT NOTE ADULT - ASSESSMENT
70 y/o F with h/o CAD s/p CABG, AF/flutter, recurrent UTI/permanent Castellon, chronic systolic heart failure admitted in mixed shock septic/cardiogenic. S/p thoracentesis, patient found to be in atrial flutter, LVEF dropped to 30 from ~ 45% about 2 months ago. Today, POC US shows EF ~ 15%,dilated IVC. Patient is on two pressors with MAP ~ 65 mmHg. Renal function continues to deteriorate.

## 2020-05-13 NOTE — PROGRESS NOTE ADULT - ASSESSMENT
IMPRESSION:    Shock likely cardiogenic  HO CAD  VHD  UTI.  Staph Aureus with HO Chronic Castellon     PLAN:    CNS: No depressants     HEENT: Oral care    PULMONARY:  HOB @ 45 degrees.  Aspiration precautions.  VBG for Ph.      CARDIOVASCULAR: Wean Levophed.  ECHO.  Add Vaso.  Wean Levophed off.  FU wiht CTS and interventional cards.        GI: GI prophylaxis.  Feeding.  Bowel regimen     RENAL:  Follow up lytes.  Correct as needed.  FU Renal.  NaHCo3 oral..      INFECTIOUS DISEASE: Follow up cultures.  Rocephin and Zyvox.  FU sensitivity.      HEMATOLOGICAL:  DVT prophylaxis.      ENDOCRINE:  Follow up FS.  Insulin protocol if needed.    MUSCULOSKELETAL:  Bed chair position     ICU for now

## 2020-05-14 NOTE — PROGRESS NOTE ADULT - ASSESSMENT
IMPRESSION:    Shock likely cardiogenic  HO CAD  Aflutter   VHD  UTI.  MSSA with HO Chronic Castellon     PLAN:    CNS: No depressants     HEENT: Oral care    PULMONARY:  HOB @ 45 degrees.  Aspiration precautions.  VBG for Ph.      CARDIOVASCULAR: Wean Levophed. FU with cardiology and EP.  DC lopressor.         GI: GI prophylaxis.  Feeding.  Bowel regimen     RENAL:  Follow up lytes.  Correct as needed.  FU Renal.  NaHCo3 oral..      INFECTIOUS DISEASE: Follow up cultures. Nafcillin.        HEMATOLOGICAL:  DVT prophylaxis.  FU PTT ( Hold Heparin for Thoracentesis)     ENDOCRINE:  Follow up FS.  Insulin protocol if needed.    MUSCULOSKELETAL:  Bed chair position     ICU for now

## 2020-05-14 NOTE — PROGRESS NOTE ADULT - ASSESSMENT
70 y/o female with pmh of DM, stomach ca, bladder prolapse, heart failure, s/p CABG 10/2019 presents complaining of abdominal distension for the past couple of days which has been worsening.     Patient assessed this morning. Not in any acute distress. No significant events overnight.    Pressors : Levo 0.08 mcg, Vasopressin 0.04mcg      # Acute Cardiogenic shock in setting Atrial Flutter :  - 2D-Echo reviewed, EF is 20-30% as per cardiac fellow. Only moderate stenosis of aortic valve.  - Severe aortic stenosis as per recent echo. According to patient EF was 50% on latest echo 2 months ago.  - Attempting to pharmacologially cardiovert pt. Started Amio infusion after bolus (5/13)  - Patient started on heparin gtt, will anticoagulate, plan for DCCV on 5/18.  - [place]NP of 23118, cardiac enzymes elevated, repeat levels are stable  - Patient is hypotensive, requiring pressors. Go up on Vaso and try to wean off Levophed.  - EKG shows A-flutter, HR ~ 114, somewhat improved from yesterday  - Holding HF meds for now - carvedilol, Valtessa, Ivabradine, Enteresto  - Heart failure specialist's recommendations appreciated.    # RIC on CKD stage 4, creatinine baseline 3  secondary to ATN and cardiogenic shock  - SCr 5.3--> 5.8 , baseline ~ 0.9  - patient noted decreased urine output over past week  - anuria, producing 20-30 cc of urine overnight  - did not respond to lasix challenge (180 mg) , 75 cc of urine output  - UA noted for Eosinophils, patient was on cipro before admission for UTI, possible ATN  - FeNa > 1%  - Also US abdomen showed echogenic material around montejo cath in U. bladder, will need repeat scan with distended bladder once pt is stable enough  - Nephro Recs appreciated, patient started on PO bicarb    # UTI in setting of chronic Montejo :  - UA positive, UCx positive for ALLEN  - will change IV vanco and Rocephin to , IV nafcillin 2g Q4hrs 5/14.    # Rt Pleural effusion  - Thora done on admission, 300ml of fluid drained, looks transudative  - repeat CXR today shows fluid re-accumulating, will repeat Thoracocentesis today  - patient not in distress though    # H/O GI bleed (stable)  - patient had episode of lynn in October 2019, patient required blood transfusion at that time  - according to patient a colonoscopy was done and a "rectal ulcer " was found  - patient has been getting IV Iron infusion ever2-3 months since that time  -  # DM2  - blood glucose monitoring  - insulin regimen started    # Stomach Ca, bladder prolapse  - chronic issues, follow up outpatient    Activity: as tolerated  diet: Nephro/fluid restriction  dvt ppx: heparin 5k tid  gi ppx: on ppi  full code  dispo: from home. 68 y/o female with pmh of DM, stomach ca, bladder prolapse, heart failure, s/p CABG 10/2019 presents complaining of abdominal distension for the past couple of days which has been worsening.     Patient assessed this morning. Not in any acute distress. No significant events overnight.    Pressors : Levo 0.08 mcg, Vasopressin 0.04mcg      # Acute Cardiogenic shock in setting Atrial Flutter :  - 2D-Echo reviewed, EF is 20-30% as per cardiac fellow. Only moderate stenosis of aortic valve.  - Severe aortic stenosis as per recent echo. According to patient EF was 50% on latest echo 2 months ago.  - Attempting to pharmacologially cardiovert pt. Started Amio infusion after bolus (5/13)  - Patient started on heparin gtt, will anticoagulate, plan for DCCV Tomorrow at 11am.  - BNP of 779[address], cardiac enzymes elevated, repeat levels are stable  - Patient is hypotensive, requiring pressors. Go up on Vaso and try to wean off Levophed.  - EKG shows A-flutter, HR ~ 114, somewhat improved from yesterday  - Holding HF meds for now - carvedilol, Valtessa, Ivabradine, Enteresto  - Heart failure specialist's recommendations appreciated.    # RIC on CKD stage 4, creatinine baseline 3  secondary to ATN and cardiogenic shock  - SCr 5.3--> 5.8 , baseline ~ 0.9  - [place] cath placed, start CVVHD today.  - patient noted decreased urine output over past week  - anuria, producing 20-30 cc of urine overnight  - did not respond to lasix challenge (180 mg) , 75 cc of urine output  - UA noted for Eosinophils, patient was on cipro before admission for UTI, possible ATN  - FeNa > 1%  - Also US abdomen showed echogenic material around montejo cath in U. bladder, will need repeat scan with distended bladder once pt is stable enough  - Nephro Recs appreciated, patient started on PO bicarb    # UTI in setting of chronic Montejo :  - UA positive, UCx positive for ALLEN  - will change IV vanco and Rocephin to , IV nafcillin 2g Q4hrs 5/14.    # Rt Pleural effusion  - Thora done on admission, 300ml of fluid drained, looks transudative  - repeat CXR today shows fluid re-accumulating, will repeat Thoracocentesis today  - patient not in distress though    # H/O GI bleed (stable)  - patient had episode of lynn in October 2019, patient required blood transfusion at that time  - according to patient a colonoscopy was done and a "rectal ulcer " was found  - patient has been getting IV Iron infusion ever2-3 months since that time  -  # DM2  - blood glucose monitoring  - insulin regimen started    # Stomach Ca, bladder prolapse  - chronic issues, follow up outpatient    Activity: as tolerated  diet: Nephro/fluid restriction  dvt ppx: heparin 5k tid  gi ppx: on ppi  full code  dispo: from home.

## 2020-05-14 NOTE — PROGRESS NOTE ADULT - SUBJECTIVE AND OBJECTIVE BOX
LENGTH OF HOSPITAL STAY: 3d    CHIEF COMPLAINT:   Patient is a 69y old  Female who presents with a chief complaint of sob (14 May 2020 08:42)      HISTORY OF PRESENTING ILLNESS:    HPI:  68 y/o female with pmh of DM, stomach ca, bladder prolapse, heart failure, s/p CABG 10/2019 presents complaining of abdominal distension for the past couple of days which has been worsening.  Patient states that her symptoms are similar to when she has urinary tract infections. She has had two other episodes of similar presenting symptoms since having an indwelling montejo catheter placed after her CABG (10/2019) as mentioned.  Patient was treated with cipro as outpt; reports decreased PO fluids since the symptoms began.  Patient denies fever/chills, abd pain, n/v/d, cough, chest pain, SOB. (12 May 2020 00:57)    PAST MEDICAL & SURGICAL HISTORY  PAST MEDICAL & SURGICAL HISTORY:  Heart failure  Female bladder prolapse  Diabetes  S/P CABG (coronary artery bypass graft)  History of repair of hip fracture    SOCIAL HISTORY:    ALLERGIES:  No Known Allergies    MEDICATIONS:  STANDING MEDICATIONS  aMIOdarone Infusion 0.5 mG/Min IV Continuous <Continuous>  aMIOdarone Infusion 1 mG/Min IV Continuous <Continuous>  ascorbic acid 500 milliGRAM(s) Oral daily  aspirin  chewable 81 milliGRAM(s) Oral daily  atorvastatin 40 milliGRAM(s) Oral at bedtime  chlorhexidine 4% Liquid 1 Application(s) Topical <User Schedule>  dextrose 5%. 1000 milliLiter(s) IV Continuous <Continuous>  dextrose 50% Injectable 12.5 Gram(s) IV Push once  dextrose 50% Injectable 25 Gram(s) IV Push once  dextrose 50% Injectable 25 Gram(s) IV Push once  heparin  Infusion 750 Unit(s)/Hr IV Continuous <Continuous>  insulin glargine Injectable (LANTUS) 15 Unit(s) SubCutaneous every morning  insulin lispro Injectable (HumaLOG) 5 Unit(s) SubCutaneous three times a day before meals  lactobacillus acidophilus 1 Tablet(s) Oral three times a day  nafcillin  IVPB      nafcillin  IVPB 2 Gram(s) IV Intermittent every 4 hours  nafcillin  IVPB 2 Gram(s) IV Intermittent once  norepinephrine Infusion 0.05 MICROgram(s)/kG/Min IV Continuous <Continuous>  pantoprazole    Tablet 40 milliGRAM(s) Oral before breakfast  senna 2 Tablet(s) Oral at bedtime  sertraline 25 milliGRAM(s) Oral daily  sodium bicarbonate 650 milliGRAM(s) Oral every 6 hours  tiotropium 18 MICROgram(s) Capsule 1 Capsule(s) Inhalation daily  vasopressin Infusion 0.04 Unit(s)/Min IV Continuous <Continuous>    PRN MEDICATIONS  acetaminophen   Tablet .. 650 milliGRAM(s) Oral every 6 hours PRN  dextrose 40% Gel 15 Gram(s) Oral once PRN  glucagon  Injectable 1 milliGRAM(s) IntraMuscular once PRN  guaifenesin/dextromethorphan  Syrup 10 milliLiter(s) Oral every 6 hours PRN    VITALS:   T(F): 97.5  HR: 114  BP: 105/71  RR: 18  SpO2: 93%    LABS:                        11.5   6.40  )-----------( 146      ( 14 May 2020 04:50 )             37.9     05-14    139  |  111<H>  |  101<HH>  ----------------------------<  194<H>  5.0   |  12<L>  |  5.8<HH>    Ca    8.6      14 May 2020 04:50  Mg     2.2     05-14    TPro  5.2<L>  /  Alb  3.5  /  TBili  <0.2  /  DBili  x   /  AST  7   /  ALT  <5  /  AlkPhos  54  05-13    PTT - ( 14 May 2020 07:15 )  PTT:>200.0 sec    ABG - ( 13 May 2020 04:48 )  pH, Arterial: 7.14  pH, Blood: x     /  pCO2: 35    /  pO2: 74    / HCO3: 12    / Base Excess: -16.1 /  SaO2: 95                    Culture - Urine (collected 11 May 2020 22:30)  Source: .Urine Clean Catch (Midstream)  Final Report (13 May 2020 18:39):    >100,000 CFU/ml Staphylococcus aureus  Organism: Staphylococcus aureus (13 May 2020 18:39)  Organism: Staphylococcus aureus (13 May 2020 18:39)    Culture - Fungal, Body Fluid (collected 11 May 2020 22:22)  Source: .Body Fluid Pleural Fluid  Preliminary Report (13 May 2020 08:00):    Testing in progress    Culture - Body Fluid with Gram Stain (collected 11 May 2020 22:22)  Source: .Body Fluid Pleural Fluid  Gram Stain (12 May 2020 15:32):    Moderate polymorphonuclear leukocytes seen per low power field    No organisms seen per oil power field    by cytocentrifuge  Preliminary Report (13 May 2020 10:05):    No growth    Culture - Blood (collected 11 May 2020 19:23)  Source: .Blood Blood-Peripheral  Preliminary Report (13 May 2020 01:01):    No growth to date.    Culture - Blood (collected 11 May 2020 19:23)  Source: .Blood Blood-Peripheral  Preliminary Report (13 May 2020 01:01):    No growth to date.      CARDIAC MARKERS ( 12 May 2020 11:00 )  x     / 0.25 ng/mL / x     / x     / x          RADIOLOGY:  < from: Xray Chest 1 View- PORTABLE-Routine (05.14.20 @ 05:45) >  Impression:      Cardiomegaly and CHF, with large left pleural effusion, unchanged.      < end of copied text >    < from: US Renal (05.12.20 @ 06:28) >    IMPRESSION:    Nonspecific heterogeneous echotexture to the right kidney. When the patient's current symptoms improve, repeat renal ultrasound is recommended.    No hydronephrosis.    Nonspecific echogenic material within a collapsed bladder. A bladder ultrasound is recommended when the bladder can be fully distended.    Large amount of ascites.      < end of copied text >      PHYSICAL EXAM:  GEN: No acute distress  HEENT: AT, NC, PERRLA  LUNGS: decreased breath sounds over Left lung  HEART: S1/S2 present. RRR.   ABD: Soft, non-tender, non-distended. Bowel sounds present  EXT: edema 2+ , cyanosis, clubbing absent, ROM normal  NEURO: AAOX3

## 2020-05-14 NOTE — PROCEDURAL SAFETY CHECKLIST WITH OR WITHOUT SEDATION - NSPTSPECIFCONCERNSD_GEN_ALL_CORE
no decreased eating/drinking/no vomiting/no chills/no dizziness/no weakness/no fever/no nausea/no numbness/no tingling/no pain
no

## 2020-05-14 NOTE — PROGRESS NOTE ADULT - ASSESSMENT
1. RIC. ATN (hypotension) vs AIN (+urine eosinophils) vs cardiorenal (EF 20-30%, new). May have had bladder outlet obstruction on admission, renal sonogram showed collapsed bladder and no hydronephrosis. Likely has underlying CKD given long standing proteinuria. Monitor I/Os. Daily BMP. Urine output inadequate despite diuretic therapy. Acidemia. Creatinine increasing. Will need renal replacement therapy. Needs temporary HD catheter. Will start CVVHD given hypotension.   2. UTI/sepsis. Followup urine culture. On nafcillin.  3. HAGMA. Likely from kidney failure. Continue PO bicarb, stop once RRT initiated.

## 2020-05-14 NOTE — PROGRESS NOTE ADULT - SUBJECTIVE AND OBJECTIVE BOX
Patient is a 69y old  Female who presents with a chief complaint of sob (13 May 2020 13:32)        Over Night Events:  Remains on Vaso and Levophed 0.08.          ROS:     All ROS are negative except HPI         PHYSICAL EXAM    ICU Vital Signs Last 24 Hrs  T(C): 36.4 (14 May 2020 08:00), Max: 36.4 (14 May 2020 08:00)  T(F): 97.5 (14 May 2020 08:00), Max: 97.5 (14 May 2020 08:00)  HR: 112 (14 May 2020 08:00) (82 - 146)  BP: 70/51 (14 May 2020 08:00) (57/39 - 130/77)  BP(mean): 58 (14 May 2020 08:00) (44 - 103)  ABP: --  ABP(mean): --  RR: 13 (14 May 2020 08:00) (8 - 26)  SpO2: 99% (14 May 2020 08:00) (89% - 100%)      CONSTITUTIONAL:  Well nourished.  NAD    ENT:   Airway patent,   Mouth with normal mucosa.   No thrush    EYES:   Pupils equal,   Round and reactive to light.    CARDIAC:   Normal rate,   Regular rhythm.     edema      Vascular:  Normal systolic impulse  No Carotid bruits    RESPIRATORY:   No wheezing  Dec BS Left side   Normal chest expansion  Not tachypneic,  No use of accessory muscles    GASTROINTESTINAL:  Abdomen soft,   Non-tender,   No guarding,   + BS    GENITOURINARY  normal genitalia for sex  no edema    MUSCULOSKELETAL:   Range of motion is not limited,  No clubbing, cyanosis    NEUROLOGICAL:   Alert and oriented   No motor  deficits.    SKIN:   Skin normal color for race,   Warm and dry and intact.   No evidence of rash.    PSYCHIATRIC:   No apparent risk to self or others.    HEMATOLOGICAL:  No cervical  lymphadenopathy.  no inguinal lymphadenopathy      05-13-20 @ 07:01  -  05-14-20 @ 07:00  --------------------------------------------------------  IN:    amiodarone Infusion: 199.8 mL    amiodarone Infusion: 200 mL    heparin Infusion: 115 mL    IV PiggyBack: 150 mL    norepinephrine Infusion: 33 mL    norepinephrine Infusion: 43 mL    vasopressin Infusion: 28.8 mL    vasopressin Infusion: 19.2 mL  Total IN: 788.8 mL    OUT:    Indwelling Catheter - Urethral: 305 mL  Total OUT: 305 mL    Total NET: 483.8 mL      05-14-20 @ 07:01  -  05-14-20 @ 08:43  --------------------------------------------------------  IN:    amiodarone Infusion: 16.7 mL    heparin Infusion: 9.5 mL    norepinephrine Infusion: 4 mL    vasopressin Infusion: 2.4 mL  Total IN: 32.6 mL    OUT:    Indwelling Catheter - Urethral: 20 mL  Total OUT: 20 mL    Total NET: 12.6 mL          LABS:                            11.5   6.40  )-----------( 146      ( 14 May 2020 04:50 )             37.9                                               05-14    139  |  111<H>  |  101<HH>  ----------------------------<  194<H>  5.0   |  12<L>  |  5.8<HH>    14 May 2020 04:50    139    |  111<H>  |  101<HH>  ----------------------------<  194<H>  5.0     |  12<L>  |  5.8<HH>  13 May 2020 03:40    141    |  111<H>  |  100<HH>  ----------------------------<  112<H>  4.7     |  13<L>  |  5.3<HH>    Ca    8.6        14 May 2020 04:50  Ca    8.7        13 May 2020 03:40  Mg     2.2       14 May 2020 04:50    TPro  5.2<L>  /  Alb  3.5    /  TBili  <0.2   /  DBili  x      /  AST  7      /  ALT  <5     /  AlkPhos  54     13 May 2020 03:40      Ca    8.6      14 May 2020 04:50  Mg     2.2     05-14    TPro  5.2<L>  /  Alb  3.5  /  TBili  <0.2  /  DBili  x   /  AST  7   /  ALT  <5  /  AlkPhos  54  05-13      PTT - ( 14 May 2020 07:15 )  PTT:>200.0 sec                                           CARDIAC MARKERS ( 12 May 2020 11:00 )  x     / 0.25 ng/mL / x     / x     / x                                                LIVER FUNCTIONS - ( 13 May 2020 03:40 )  Alb: 3.5 g/dL / Pro: 5.2 g/dL / ALK PHOS: 54 U/L / ALT: <5 U/L / AST: 7 U/L / GGT: x                                                  Culture - Urine (collected 11 May 2020 22:30)  Source: .Urine Clean Catch (Midstream)  Final Report (13 May 2020 18:39):    >100,000 CFU/ml Staphylococcus aureus  Organism: Staphylococcus aureus (13 May 2020 18:39)  Organism: Staphylococcus aureus (13 May 2020 18:39)    Culture - Fungal, Body Fluid (collected 11 May 2020 22:22)  Source: .Body Fluid Pleural Fluid  Preliminary Report (13 May 2020 08:00):    Testing in progress    Culture - Body Fluid with Gram Stain (collected 11 May 2020 22:22)  Source: .Body Fluid Pleural Fluid  Gram Stain (12 May 2020 15:32):    Moderate polymorphonuclear leukocytes seen per low power field    No organisms seen per oil power field    by cytocentrifuge  Preliminary Report (13 May 2020 10:05):    No growth    Culture - Blood (collected 11 May 2020 19:23)  Source: .Blood Blood-Peripheral  Preliminary Report (13 May 2020 01:01):    No growth to date.    Culture - Blood (collected 11 May 2020 19:23)  Source: .Blood Blood-Peripheral  Preliminary Report (13 May 2020 01:01):    No growth to date.                                                                                       ABG - ( 13 May 2020 04:48 )  pH, Arterial: 7.14  pH, Blood: x     /  pCO2: 35    /  pO2: 74    / HCO3: 12    / Base Excess: -16.1 /  SaO2: 95                  MEDICATIONS  (STANDING):  aMIOdarone Infusion 0.5 mG/Min (16.7 mL/Hr) IV Continuous <Continuous>  aMIOdarone Infusion 1 mG/Min (33.3 mL/Hr) IV Continuous <Continuous>  ascorbic acid 500 milliGRAM(s) Oral daily  aspirin  chewable 81 milliGRAM(s) Oral daily  atorvastatin 40 milliGRAM(s) Oral at bedtime  cefTRIAXone   IVPB      cefTRIAXone   IVPB 1000 milliGRAM(s) IV Intermittent every 24 hours  chlorhexidine 4% Liquid 1 Application(s) Topical <User Schedule>  dextrose 5%. 1000 milliLiter(s) (50 mL/Hr) IV Continuous <Continuous>  dextrose 50% Injectable 12.5 Gram(s) IV Push once  dextrose 50% Injectable 25 Gram(s) IV Push once  dextrose 50% Injectable 25 Gram(s) IV Push once  heparin  Infusion 1100 Unit(s)/Hr (9.5 mL/Hr) IV Continuous <Continuous>  insulin glargine Injectable (LANTUS) 15 Unit(s) SubCutaneous every morning  insulin lispro Injectable (HumaLOG) 5 Unit(s) SubCutaneous three times a day before meals  lactobacillus acidophilus 1 Tablet(s) Oral three times a day  metoprolol tartrate 12.5 milliGRAM(s) Oral every 6 hours  norepinephrine Infusion 0.05 MICROgram(s)/kG/Min (2.87 mL/Hr) IV Continuous <Continuous>  pantoprazole    Tablet 40 milliGRAM(s) Oral before breakfast  senna 2 Tablet(s) Oral at bedtime  sertraline 25 milliGRAM(s) Oral daily  sodium bicarbonate 650 milliGRAM(s) Oral every 8 hours  tiotropium 18 MICROgram(s) Capsule 1 Capsule(s) Inhalation daily  vancomycin  IVPB 750 milliGRAM(s) IV Intermittent every 48 hours  vasopressin Infusion 0.04 Unit(s)/Min (2.4 mL/Hr) IV Continuous <Continuous>    MEDICATIONS  (PRN):  acetaminophen   Tablet .. 650 milliGRAM(s) Oral every 6 hours PRN Moderate Pain (4 - 6)  dextrose 40% Gel 15 Gram(s) Oral once PRN Blood Glucose LESS THAN 70 milliGRAM(s)/deciliter  glucagon  Injectable 1 milliGRAM(s) IntraMuscular once PRN Glucose LESS THAN 70 milligrams/deciliter  guaifenesin/dextromethorphan  Syrup 10 milliLiter(s) Oral every 6 hours PRN Cough      New X-rays reviewed:                                                                                  ECHO    CXR interpreted by me:

## 2020-05-14 NOTE — PROGRESS NOTE ADULT - SUBJECTIVE AND OBJECTIVE BOX
[place] [facility] FOLLOW UP NOTE  --------------------------------------------------------------------------------  24 hour events/subjective: Patient examined. Appears comfortable.    PAST HISTORY  --------------------------------------------------------------------------------  No significant changes to PMH, PSH, FHx, SHx, unless otherwise noted    ALLERGIES & MEDICATIONS  --------------------------------------------------------------------------------  Allergies    No Known Allergies    Intolerances      Standing Inpatient Medications  aMIOdarone Infusion 0.5 mG/Min IV Continuous <Continuous>  aMIOdarone Infusion 1 mG/Min IV Continuous <Continuous>  ascorbic acid 500 milliGRAM(s) Oral daily  aspirin  chewable 81 milliGRAM(s) Oral daily  atorvastatin 40 milliGRAM(s) Oral at bedtime  chlorhexidine 4% Liquid 1 Application(s) Topical <User Schedule>  dextrose 5%. 1000 milliLiter(s) IV Continuous <Continuous>  dextrose 50% Injectable 12.5 Gram(s) IV Push once  dextrose 50% Injectable 25 Gram(s) IV Push once  dextrose 50% Injectable 25 Gram(s) IV Push once  heparin  Infusion 750 Unit(s)/Hr IV Continuous <Continuous>  insulin glargine Injectable (LANTUS) 15 Unit(s) SubCutaneous every morning  insulin lispro Injectable (HumaLOG) 5 Unit(s) SubCutaneous three times a day before meals  lactobacillus acidophilus 1 Tablet(s) Oral three times a day  nafcillin  IVPB      nafcillin  IVPB 2 Gram(s) IV Intermittent every 4 hours  norepinephrine Infusion 0.05 MICROgram(s)/kG/Min IV Continuous <Continuous>  pantoprazole    Tablet 40 milliGRAM(s) Oral before breakfast  senna 2 Tablet(s) Oral at bedtime  sertraline 25 milliGRAM(s) Oral daily  sodium bicarbonate 650 milliGRAM(s) Oral every 6 hours  tiotropium 18 MICROgram(s) Capsule 1 Capsule(s) Inhalation daily  vasopressin Infusion 0.04 Unit(s)/Min IV Continuous <Continuous>    PRN Inpatient Medications  acetaminophen   Tablet .. 650 milliGRAM(s) Oral every 6 hours PRN  dextrose 40% Gel 15 Gram(s) Oral once PRN  glucagon  Injectable 1 milliGRAM(s) IntraMuscular once PRN  guaifenesin/dextromethorphan  Syrup 10 milliLiter(s) Oral every 6 hours PRN      VITALS/PHYSICAL EXAM  --------------------------------------------------------------------------------  T(C): 36.4 (05-14-20 @ 08:00), Max: 36.4 (05-14-20 @ 08:00)  HR: 116 (05-14-20 @ 10:30) (82 - 144)  BP: 109/72 (05-14-20 @ 10:30) (57/39 - 130/77)  RR: 21 (05-14-20 @ 10:30) (8 - 26)  SpO2: 97% (05-14-20 @ 10:30) (93% - 100%)  Wt(kg): --        05-13-20 @ 07:01  -  05-14-20 @ 07:00  --------------------------------------------------------  IN: 788.8 mL / OUT: 305 mL / NET: 483.8 mL    05-14-20 @ 07:01  -  05-14-20 @ 10:48  --------------------------------------------------------  IN: 180.1 mL / OUT: 70 mL / NET: 110.1 mL      Physical Exam:  	Gen: NAD  	Pulm: CTA B/L  	CV: RRR, S1S2  	Abd: +BS, soft, nontender/nondistended  	: No suprapubic tenderness  	LE: Warm, edema    LABS/STUDIES  --------------------------------------------------------------------------------              11.5   6.40  >-----------<  146      [05-14-20 @ 04:50]              37.9     139  |  111  |  101  ----------------------------<  194      [05-14-20 @ 04:50]  5.0   |  12  |  5.8        Ca     8.6     [05-14-20 @ 04:50]      Mg     2.2     [05-14-20 @ 04:50]    TPro  5.2  /  Alb  3.5  /  TBili  <0.2  /  DBili  x   /  AST  7   /  ALT  <5  /  AlkPhos  54  [05-13-20 @ 03:40]      PTT: >200.0      [05-14-20 @ 07:15]    Troponin 0.25      [05-12-20 @ 11:00]    Creatinine Trend:  SCr 5.8 [05-14 @ 04:50]  SCr 5.3 [05-13 @ 03:40]  SCr 4.9 [05-12 @ 04:14]  SCr 4.9 [05-11 @ 19:23]    Urinalysis - [05-11-20 @ 22:30]      Color Yellow / Appearance Turbid / SG 1.005 / pH 7.0      Gluc Negative / Ketone Negative  / Bili Negative / Urobili <2 mg/dL       Blood Moderate / Protein 100 mg/dL / Leuk Est Large / Nitrite Negative      RBC 3 / WBC >720 / Hyaline 8 / Gran  / Sq Epi  / Non Sq Epi 3 / Bacteria Moderate    Urine Creatinine 102      [05-12-20 @ 05:50]  Urine Protein 465      [05-12-20 @ 05:50]  Urine Sodium 59.0      [05-12-20 @ 05:50]  Urine Urea Nitrogen 215      [05-12-20 @ 05:50]  Urine Phosphorus 11      [05-12-20 @ 05:50]  Urine Osmolality 319      [05-12-20 @ 05:50]    Iron 24, TIBC 204, %sat 12      [08-30-19 @ 07:16]  Ferritin 112      [08-30-19 @ 07:16]  HbA1c 5.7      [11-09-19 @ 01:20]  TSH 0.58      [08-02-19 @ 18:37]  Lipid: chol 153, , HDL 45, LDL 75      [08-30-19 @ 07:16]

## 2020-05-14 NOTE — PROCEDURAL SAFETY CHECKLIST WITH OR WITHOUT SEDATION - NSTEAMPROVIDERFT_GEN_ALL_CORE
Problem:  Infant, Late or Early Term  Goal: Signs and Symptoms of Listed Potential Problems Will be Absent, Minimized or Managed ( Infant, Late or Early Term)  Signs and symptoms of listed potential problems will be absent, minimized or managed by discharge/transition of care (reference  Infant, Late or Early Term CPG).   Patients' VSS overnight; remains comfortable on room air with no alarms.  Voiding and stooling and tolerating feeds gavaged over 30 minutes with no emesis. Increased feeds from 14 to 18 mL's at 0600. Continues to have patent saline lock. Parents at the bedside early in the shift, holding and attentive to patient. Will continue to monitor patient per provider orders.       Dr. Thompson

## 2020-05-15 NOTE — PROGRESS NOTE ADULT - ASSESSMENT
IMPRESSION:    Shock likely cardiogenic  HO CAD  Aflutter for cardioversion   VHD  UTI.  MSSA with HO Chronic Castellon     PLAN:    CNS: No depressants     HEENT: Oral care    PULMONARY:  HOB @ 45 degrees.  Aspiration precautions.  VBG for Ph.      CARDIOVASCULAR: Wean Levophed. FU with cardiology and EP.  Cardioversion today       GI: GI prophylaxis.  Feeding post cardioversion.  Bowel regimen     RENAL:  Follow up lytes.  Correct as needed.  FU Renal.  NaHCo3 oral. Possible HD today     INFECTIOUS DISEASE: Follow up cultures. Nafcillin.        HEMATOLOGICAL:  DVT prophylaxis.  FU PTT     ENDOCRINE:  Follow up FS.  Insulin protocol if needed.    MUSCULOSKELETAL:  Bed chair position     ICU for now

## 2020-05-15 NOTE — CHART NOTE - NSCHARTNOTEFT_GEN_A_CORE
PACU ANESTHESIA ADMISSION NOTE      Procedure:   Post op diagnosis:      ____  Intubated  TV:______       Rate: ______      FiO2: ______    ___x_  Patent Airway    __x__  Full return of protective reflexes    _x___  Full recovery from anesthesia / back to baseline     Vitals:   T:           R:   15               /51:                  Sat: 100                  P: 78      Mental Status:  x____ Awake   x_____ Alert   _____ Drowsy   _____ Sedated    Nausea/Vomiting:  ____ NO  ______Yes,   See Post - Op Orders          Pain Scale (0-10):  _____    Treatment: ____ None    ____ See Post - Op/PCA Orders    Post - Operative Fluids:   ____ Oral   ____ See Post - Op Orders    Plan: Discharge:   ____Home       _____Floor     x_____Critical Care    _____  Other:_________________    Comments:

## 2020-05-15 NOTE — PROGRESS NOTE ADULT - ASSESSMENT
70 y/o female with pmh of DM, stomach ca, bladder prolapse, heart failure, s/p CABG 10/2019 presents complaining of abdominal distension for the past couple of days which has been worsening.     Patient assessed this morning. Not in any acute distress. No significant events overnight.    Pressors : Levo 0.08 mcg, Vasopressin 0.04mcg      # Acute Cardiogenic shock in setting Atrial Flutter :  - 2D-Echo reviewed, EF is 20-30% as per cardiac fellow. Only moderate stenosis of aortic valve.  - According to patient EF was 50% on latest echo 2 months ago.  - Attempting to pharmacologially cardiovert pt. s/p Amio infusion , on PO amio for 2 weeks ( staring 5/14)  - Patient started on heparin gtt, will anticoagulate, plan for DCCV today.  [place] [address], cardiac enzymes elevated, repeat levels are stable  - Patient is hypotensive, requiring pressors. Go up on Vaso and try to wean off Levophed.  - EKG shows A-flutter, HR ~ 114  - Holding HF meds for now - carvedilol, Valtessa, Ivabradine, Enteresto  - Heart failure specialist's recommendations appreciated.    # RIC on CKD stage 4, creatinine baseline 3  secondary to ATN and cardiogenic shock  - SCr 5.3--> 5.8 --> 6.0 , baseline ~ 0.9  - Canones cath placed, start HD today after DCCV.  - anuria, producing 20-30 cc of urine overnight  - did not respond to lasix challenge (180 mg) , 75 cc of urine output  - UA noted for Eosinophils, patient was on cipro before admission for UTI, possible ATN  - FeNa > 1%  - Also US abdomen showed echogenic material around montejo cath in U. bladder, will need repeat scan with distended bladder once pt is stable enough  - Nephro Recs appreciated, patient started on PO bicarb    # UTI in setting of chronic Montejo :  - UA positive, UCx positive for ALLEN  - will change IV vanco and Rocephin to , IV nafcillin 2g Q4hrs 5/14.    # Rt Pleural effusion  - Thora done on admission, 600ml of fluid drained, looked transudative, Repeated agin on 5/14, 650 ml remioved  - repeat CXR today shows fluid re-accumulating  - patient not in distress though    # H/O GI bleed (stable)  - patient had episode of lynn in October 2019, patient required blood transfusion at that time  - according to patient a colonoscopy was done and a "rectal ulcer " was found  - patient has been getting IV Iron infusion every 2-3 months since that time  -  # DM2  - blood glucose monitoring  - insulin regimen started    # bladder prolapse  - chronic issues, follow up outpatient    Activity: as tolerated  diet: Nephro/fluid restriction  dvt ppx: heparin 5k tid  gi ppx: on ppi  full code  dispo: from home.

## 2020-05-15 NOTE — PROGRESS NOTE ADULT - SUBJECTIVE AND OBJECTIVE BOX
Interval history:  HR decreased after amiodarone started, renal function continues to worsen. Plan for HD today.         HPI:    70 y/o F with h/o DM, CAD s/p CABG, AF/flutter not on A/C due to GI bleeding pending work up (NUBIA clipped during CABG), ICM, chronic systolic heart failure, with LVEF 20% > 45% > 25% now, admitted from from with complaint of fatigue, low blood pressure. Patient was found to be hypotensive to the 70s upon arrive for which she was started on pressors and admitted to ICU. Her mental status has remained intact. Creat was  4.9 with BUN 99. Upon admission patient was in atrial flutter with rate 130-140s. An echocardiogram showed drop in LV systolic function to ~ 30-35%.       PMH:  DM, stomach ca, bladder prolapse, heart failure    PSH: CABG     Social: Denies smoking or ETOH       Physical exam     General: AAO x3, no acute distress   Eyes: Clear eyes   Neck: Trachea is central, JVD +   Lungs: Decreased respiratory sounds on left side  Heart: tachycardic, regular heart sounds, no murmurs appreciated.    GI: Abdomen is soft, NT  Neuro: Normal strength  Psych: Calm affect

## 2020-05-15 NOTE — PROGRESS NOTE ADULT - PROBLEM SELECTOR PLAN 1
Patient remains in dual pressor support   BP improved today after DCCV  patient in sinus rhythm   Wean norepinephrine and continue with vasopressin   Start metoprolol 12.5 mg every 6 hrs ( ICU reluctant to start it)  Continue broad spectrum Abx  Monitor blood/urine cultures  Discussed with ICU team

## 2020-05-15 NOTE — PROGRESS NOTE ADULT - SUBJECTIVE AND OBJECTIVE BOX
LENGTH OF HOSPITAL STAY: 4d    CHIEF COMPLAINT:   Patient is a 69y old  Female who presents with a chief complaint of sob (15 May 2020 07:37)      HISTORY OF PRESENTING ILLNESS:    HPI:  70 y/o female with pmh of DM, stomach ca, bladder prolapse, heart failure, s/p CABG 10/2019 presents complaining of abdominal distension for the past couple of days which has been worsening.  Patient states that her symptoms are similar to when she has urinary tract infections. She has had two other episodes of similar presenting symptoms since having an indwelling montejo catheter placed after her CABG (10/2019) as mentioned.  Patient was treated with cipro as outpt; reports decreased PO fluids since the symptoms began.  Patient denies fever/chills, abd pain, n/v/d, cough, chest pain, SOB. (12 May 2020 00:57)    PAST MEDICAL & SURGICAL HISTORY  PAST MEDICAL & SURGICAL HISTORY:  Heart failure  Female bladder prolapse  Diabetes  S/P CABG (coronary artery bypass graft)  History of repair of hip fracture    SOCIAL HISTORY:    ALLERGIES:  No Known Allergies    MEDICATIONS:  STANDING MEDICATIONS  aDENosine Injectable (ADENOCARD) 12 milliGRAM(s) IV Push once  aMIOdarone    Tablet 400 milliGRAM(s) Oral every 12 hours  aMIOdarone Infusion 0.5 mG/Min IV Continuous <Continuous>  aMIOdarone Infusion 1 mG/Min IV Continuous <Continuous>  ascorbic acid 500 milliGRAM(s) Oral daily  aspirin  chewable 81 milliGRAM(s) Oral daily  atorvastatin 40 milliGRAM(s) Oral at bedtime  chlorhexidine 4% Liquid 1 Application(s) Topical <User Schedule>  dextrose 5%. 1000 milliLiter(s) IV Continuous <Continuous>  dextrose 50% Injectable 12.5 Gram(s) IV Push once  dextrose 50% Injectable 25 Gram(s) IV Push once  dextrose 50% Injectable 25 Gram(s) IV Push once  heparin  Infusion 750 Unit(s)/Hr IV Continuous <Continuous>  insulin glargine Injectable (LANTUS) 15 Unit(s) SubCutaneous every morning  insulin lispro Injectable (HumaLOG) 5 Unit(s) SubCutaneous three times a day before meals  lactobacillus acidophilus 1 Tablet(s) Oral three times a day  nafcillin  IVPB      nafcillin  IVPB 2 Gram(s) IV Intermittent every 4 hours  norepinephrine Infusion 0.05 MICROgram(s)/kG/Min IV Continuous <Continuous>  pantoprazole    Tablet 40 milliGRAM(s) Oral before breakfast  senna 2 Tablet(s) Oral at bedtime  sertraline 25 milliGRAM(s) Oral daily  sodium bicarbonate 650 milliGRAM(s) Oral every 6 hours  tiotropium 18 MICROgram(s) Capsule 1 Capsule(s) Inhalation daily  vasopressin Infusion 0.04 Unit(s)/Min IV Continuous <Continuous>    PRN MEDICATIONS  acetaminophen   Tablet .. 650 milliGRAM(s) Oral every 6 hours PRN  dextrose 40% Gel 15 Gram(s) Oral once PRN  glucagon  Injectable 1 milliGRAM(s) IntraMuscular once PRN  guaifenesin/dextromethorphan  Syrup 10 milliLiter(s) Oral every 6 hours PRN    VITALS:   T(F): 96.8  HR: 116  BP: 131/77  RR: 26  SpO2: 96%    LABS:                        10.2   4.40  )-----------( 104      ( 15 May 2020 05:02 )             32.6     05-15    137  |  109  |  108<HH>  ----------------------------<  154<H>  4.7   |  12<L>  |  6.0<HH>    Ca    8.3<L>      15 May 2020 05:02  Mg     2.2     05-15      PTT - ( 15 May 2020 07:20 )  PTT:84.1 sec              RADIOLOGY:  < from: Xray Chest 1 View- PORTABLE-Routine (05.15.20 @ 06:38) >    IMPRESSION:      Bilateral opacities/effusions, slightly worsened at the right base. No pneumothorax.      < end of copied text >    PHYSICAL EXAM:  GEN: No acute distress  HEENT: AT, NC, PERRLA  LUNGS: decreased breath sounds over Left lung  HEART: S1/S2 present. RRR.   ABD: Soft, non-tender, non-distended. Bowel sounds present  EXT: edema 2+ , cyanosis, clubbing absent, ROM normal  NEURO: AAOX3

## 2020-05-15 NOTE — PROGRESS NOTE ADULT - SUBJECTIVE AND OBJECTIVE BOX
[facility] FOLLOW UP NOTE  --------------------------------------------------------------------------------  24 hour events/subjective: Patient examined. Appears comfortable.    PAST HISTORY  --------------------------------------------------------------------------------  No significant changes to PMH, PSH, FHx, SHx, unless otherwise noted    ALLERGIES & MEDICATIONS  --------------------------------------------------------------------------------  Allergies    No Known Allergies    Intolerances      Standing Inpatient Medications  aMIOdarone    Tablet 400 milliGRAM(s) Oral every 12 hours  aMIOdarone Infusion 1 mG/Min IV Continuous <Continuous>  ascorbic acid 500 milliGRAM(s) Oral daily  aspirin  chewable 81 milliGRAM(s) Oral daily  atorvastatin 40 milliGRAM(s) Oral at bedtime  chlorhexidine 4% Liquid 1 Application(s) Topical <User Schedule>  dextrose 5%. 1000 milliLiter(s) IV Continuous <Continuous>  dextrose 50% Injectable 12.5 Gram(s) IV Push once  dextrose 50% Injectable 25 Gram(s) IV Push once  dextrose 50% Injectable 25 Gram(s) IV Push once  heparin  Infusion 750 Unit(s)/Hr IV Continuous <Continuous>  insulin glargine Injectable (LANTUS) 15 Unit(s) SubCutaneous every morning  insulin lispro Injectable (HumaLOG) 5 Unit(s) SubCutaneous three times a day before meals  lactobacillus acidophilus 1 Tablet(s) Oral three times a day  nafcillin  IVPB      nafcillin  IVPB 2 Gram(s) IV Intermittent every 4 hours  norepinephrine Infusion 0.05 MICROgram(s)/kG/Min IV Continuous <Continuous>  pantoprazole    Tablet 40 milliGRAM(s) Oral before breakfast  senna 2 Tablet(s) Oral at bedtime  sertraline 25 milliGRAM(s) Oral daily  sodium bicarbonate 650 milliGRAM(s) Oral every 6 hours  tiotropium 18 MICROgram(s) Capsule 1 Capsule(s) Inhalation daily  vasopressin Infusion 0.04 Unit(s)/Min IV Continuous <Continuous>    PRN Inpatient Medications  acetaminophen   Tablet .. 650 milliGRAM(s) Oral every 6 hours PRN  aluminum hydroxide/magnesium hydroxide/simethicone Suspension 15 milliLiter(s) Oral once PRN  dextrose 40% Gel 15 Gram(s) Oral once PRN  glucagon  Injectable 1 milliGRAM(s) IntraMuscular once PRN  guaifenesin/dextromethorphan  Syrup 10 milliLiter(s) Oral every 6 hours PRN      VITALS/PHYSICAL EXAM  --------------------------------------------------------------------------------  T(C): 36.2 (05-15-20 @ 12:00), Max: 36.3 (05-15-20 @ 00:00)  HR: 88 (05-15-20 @ 13:30) (72 - 118)  BP: 104/67 (05-15-20 @ 13:30) (70/43 - 152/90)  RR: 30 (05-15-20 @ 13:30) (9 - 36)  SpO2: 100% (05-15-20 @ 13:30) (86% - 100%)  Wt(kg): --  Height (cm): 157.48 (05-15-20 @ 10:44)  Weight (kg): 61.2 (05-15-20 @ 10:44)  BMI (kg/m2): 24.7 (05-15-20 @ 10:44)  BSA (m2): 1.62 (05-15-20 @ 10:44)      05-14-20 @ 07:01  -  05-15-20 @ 07:00  --------------------------------------------------------  IN: 977.1 mL / OUT: 805 mL / NET: 172.1 mL    05-15-20 @ 07:01  -  05-15-20 @ 14:19  --------------------------------------------------------  IN: 195.4 mL / OUT: 0 mL / NET: 195.4 mL      Physical Exam:  	Gen: NAD  	Pulm: CTA B/L  	CV: RRR, S1S2  	Abd: +BS, soft, nontender/nondistended  	: No suprapubic tenderness  	LE: Warm, edema  	Vascular access: temp HD cath    LABS/STUDIES  --------------------------------------------------------------------------------              10.2   4.40  >-----------<  104      [05-15-20 @ 05:02]              32.6     137  |  109  |  108  ----------------------------<  154      [05-15-20 @ 05:02]  4.7   |  12  |  6.0        Ca     8.3     [05-15-20 @ 05:02]      Mg     2.2     [05-15-20 @ 05:02]    PTT: 84.1       [05-15-20 @ 07:20]    Troponin 0.21      [05-15-20 @ 12:10]    Creatinine Trend:  SCr 6.0 [05-15 @ 05:02]  SCr 5.8 [05-14 @ 04:50]  SCr 5.3 [05-13 @ 03:40]  SCr 4.9 [05-12 @ 04:14]  SCr 4.9 [05-11 @ 19:23]    Urinalysis - [05-11-20 @ 22:30]      Color Yellow / Appearance Turbid / SG 1.005 / pH 7.0      Gluc Negative / Ketone Negative  / Bili Negative / Urobili <2 mg/dL       Blood Moderate / Protein 100 mg/dL / Leuk Est Large / Nitrite Negative      RBC 3 / WBC >720 / Hyaline 8 / Gran  / Sq Epi  / Non Sq Epi 3 / Bacteria Moderate    Urine Creatinine 102      [05-12-20 @ 05:50]  Urine Protein 465      [05-12-20 @ 05:50]  Urine Sodium 59.0      [05-12-20 @ 05:50]  Urine Urea Nitrogen 215      [05-12-20 @ 05:50]  Urine Phosphorus 11      [05-12-20 @ 05:50]  Urine Osmolality 319      [05-12-20 @ 05:50]    Iron 24, TIBC 204, %sat 12      [08-30-19 @ 07:16]  Ferritin 112      [08-30-19 @ 07:16]  HbA1c 5.7      [11-09-19 @ 01:20]  TSH 0.58      [08-02-19 @ 18:37]  Lipid: chol 153, , HDL 45, LDL 75      [08-30-19 @ 07:16]

## 2020-05-15 NOTE — PROGRESS NOTE ADULT - ASSESSMENT
68 y/o F with h/o CAD s/p CABG, AF/flutter, recurrent UTI/permanent Castellon, chronic systolic heart failure admitted in mixed shock septic/cardiogenic. S/p thoracentesis, patient found to be in atrial flutter, LVEF dropped to 30 from ~ 45% about 2 months ago. POC US shows EF ~ 15%,dilated IVC. HR has slowed down to 110s but remained in atrial flutter s/p DCCV today, now in sinus rhythm.

## 2020-05-15 NOTE — PROCEDURE NOTE - NSPROCDETAILS_GEN_ALL_CORE
location identified, draped/prepped, sterile technique used, needle inserted/introduced
guidewire recovered
connected to a pressurized flush line/all materials/supplies accounted for at end of procedure/positive blood return obtained via catheter/Seldinger technique/sutured in place/location identified, draped/prepped, sterile technique used, needle inserted/introduced/ultrasound guidance/hemostasis with direct pressure, dressing applied
patient pre-sedated, synchronized cardioversion performed

## 2020-05-15 NOTE — PROGRESS NOTE ADULT - ASSESSMENT
1. RIC. ATN (hypotension) vs AIN (+urine eosinophils) vs cardiorenal (EF 20-30%, new). May have had bladder outlet obstruction on admission, renal sonogram showed collapsed bladder and no hydronephrosis. Likely has underlying CKD given long standing proteinuria. Monitor I/Os. Daily BMP. Urine output inadequate despite diuretic therapy. Acidemia. Creatinine increasing. Will need renal replacement therapy. Temporary HD catheter placed. Will attempt HD today, may eventually need CVVHD.   2. UTI/sepsis. Followup urine culture. On nafcillin.  3. HAGMA. Likely from kidney failure. Continue PO bicarb, stop once RRT initiated.

## 2020-05-15 NOTE — CHART NOTE - NSCHARTNOTEFT_GEN_A_CORE
Gastroenterology Fellow note:   Called by cardio fellow for Abdominal pain post JAMILA procedure and cardioversion   Currently Abd pain resolved, no nausea, vomiting, fever, diarrhea. NO overt gi bleed symptoms   On exam Abd - soft non tender, non distended, + BS   LA- 0.5  Abd xray and CXR - no acute findings   Given above benign findings and currently symptoms resolved unlikely to have mesenteric ischemia   Rec   Serial abd xray   diet as per primary team   trend LA   Please notify GI if any changes

## 2020-05-15 NOTE — CHART NOTE - NSCHARTNOTEFT_GEN_A_CORE
POST OPERATIVE PROCEDURAL DOCUMENTATION    PRE-OP DIAGNOSIS: Aflutter    POST-OP DIAGNOSIS: SR    PROCEDURE: Transesophageal echocardiogram    Primary Physician: GILBERT Matt MD  Fellow: JOVAN Nelson    ANESTHESIA TYPE  [  ] Conscious Sedation    CONDITION  [  ] Good    SPECIMENS REMOVED (IF APPLICABLE): NA    IMPLANTS (IF APPLICABLE): None    ESTIMATED BLOOD LOSS: None    COMPLICATIONS: None    TRANSESOPHAGEAL ECHOCARDIOGRAM     After risks and benefits of procedures were explained, informed consent was obtained and placed in chart.   The patient received topical anesthestic to the oropharynx with viscous lidocaine and benzocaine spray.  Refer to Anesthesia note for sedation details.  The JAMILA probe was passed into the esophagus without difficulty.  Transesophageal and transgastric images were obtained.  The JAMILA probe was removed without difficulty and examined.  There was no evidence for bleeding.  The patient tolerated the procedure well without any immediate JAMILA-related complications.      Preliminary Findings:  Normal LV, Rv function  Normal LA, RA  Suggestive of small PFO by colour  Mod TR  Mod MR  Sclerotic aortic valve with normal opening  Normal Pulmonic valve  No NUBIA seen, h/o MAZE    Final report to follow. POST OPERATIVE PROCEDURAL DOCUMENTATION    PRE-OP DIAGNOSIS: Aflutter    POST-OP DIAGNOSIS: SR    PROCEDURE: Transesophageal echocardiogram    Primary Physician: GILBERT Matt MD  Fellow: JOVAN Nelson    ANESTHESIA TYPE  [  ] Conscious Sedation    CONDITION  [  ] Good    SPECIMENS REMOVED (IF APPLICABLE): NA    IMPLANTS (IF APPLICABLE): None    ESTIMATED BLOOD LOSS: None    COMPLICATIONS: None    TRANSESOPHAGEAL ECHOCARDIOGRAM     After risks and benefits of procedures were explained, informed consent was obtained and placed in chart.   The patient received topical anesthestic to the oropharynx with viscous lidocaine and benzocaine spray.  Refer to Anesthesia note for sedation details.  The JAMILA probe was passed into the esophagus without difficulty.  Transesophageal and images were obtained.  The JAMILA probe was removed without difficulty and examined.  There was no evidence for bleeding.  The patient tolerated the procedure well without any immediate JAMILA-related complications.  Transgastric images not done due to hx of gastric ulcers past .   Several hours after procedure pt had some mid epigastic pain.   clinical no crepius or sob BS +   did ask GI to look at patient   No evidence of clot   can proceed with cardioversion.    Preliminary Findings:  Normal LV, Rv function  Normal LA, RA  Suggestive of small PFO by colour  Mod TR  Mod MR  Sclerotic aortic valve with normal opening  Normal Pulmonic valve  No NUBIA seen, h/o MAZE    Final report to follow.

## 2020-05-15 NOTE — PROGRESS NOTE ADULT - SUBJECTIVE AND OBJECTIVE BOX
Patient is a 69y old  Female who presents with a chief complaint of sob (14 May 2020 10:47)        Over Night Events:  On Vaso and Levophed.  SP left thoracentesis yesterday.  For cardioversion today         ROS:     All ROS are negative except HPI         PHYSICAL EXAM    ICU Vital Signs Last 24 Hrs  T(C): 36.2 (15 May 2020 04:00), Max: 36.4 (14 May 2020 08:00)  T(F): 97.2 (15 May 2020 04:00), Max: 97.5 (14 May 2020 08:00)  HR: 110 (15 May 2020 07:00) (104 - 116)  BP: 79/56 (15 May 2020 07:00) (66/54 - 140/87)  BP(mean): 65 (15 May 2020 07:00) (52 - 104)  ABP: --  ABP(mean): --  RR: 21 (15 May 2020 07:00) (9 - 36)  SpO2: 100% (15 May 2020 07:00) (86% - 100%)      CONSTITUTIONAL:   Well nourished.  NAD    ENT:   Airway patent,   Mouth with normal mucosa.   No thrush    EYES:   Pupils equal,   Round and reactive to light.    CARDIAC:   Tachycardic   Regular rhythm.    No edema      Vascular:  Normal systolic impulse  No Carotid bruits    RESPIRATORY:   No wheezing  Bilateral BS  Normal chest expansion  Not tachypneic,  No use of accessory muscles    GASTROINTESTINAL:  Abdomen soft,   Non-tender,   No guarding,   + BS    GENITOURINARY  normal genitalia for sex  no edema    MUSCULOSKELETAL:   Range of motion is not limited,  No clubbing, cyanosis    NEUROLOGICAL:   Alert and oriented   No motor  deficits.  pertinent DTRs normal    SKIN:   Skin normal color for race,   Warm and dry and intact.   No evidence of rash.    PSYCHIATRIC:   Normal mood and affect.   No apparent risk to self or others.    HEMATOLOGICAL:  No cervical  lymphadenopathy.  no inguinal lymphadenopathy      05-14-20 @ 07:01  -  05-15-20 @ 07:00  --------------------------------------------------------  IN:    amiodarone Infusion: 100.1 mL    heparin Infusion: 9.5 mL    heparin Infusion: 127.5 mL    IV PiggyBack: 500 mL    norepinephrine Infusion: 82.4 mL    Oral Fluid: 100 mL    vasopressin Infusion: 57.6 mL  Total IN: 977.1 mL    OUT:    Indwelling Catheter - Urethral: 155 mL    Other: 650 mL  Total OUT: 805 mL    Total NET: 172.1 mL          LABS:                            10.2   4.40  )-----------( 104      ( 15 May 2020 05:02 )             32.6                                               05-15    137  |  109  |  108<HH>  ----------------------------<  154<H>  4.7   |  12<L>  |  6.0<HH>      Ca    8.3<L>      15 May 2020 05:02  Mg     2.2     05-15        PTT - ( 14 May 2020 23:45 )  PTT:77.0 sec                                                                                                                                                                                                                   MEDICATIONS  (STANDING):  aMIOdarone    Tablet 400 milliGRAM(s) Oral every 12 hours  aMIOdarone Infusion 0.5 mG/Min (16.7 mL/Hr) IV Continuous <Continuous>  aMIOdarone Infusion 1 mG/Min (33.3 mL/Hr) IV Continuous <Continuous>  ascorbic acid 500 milliGRAM(s) Oral daily  aspirin  chewable 81 milliGRAM(s) Oral daily  atorvastatin 40 milliGRAM(s) Oral at bedtime  chlorhexidine 4% Liquid 1 Application(s) Topical <User Schedule>  dextrose 5%. 1000 milliLiter(s) (50 mL/Hr) IV Continuous <Continuous>  dextrose 50% Injectable 12.5 Gram(s) IV Push once  dextrose 50% Injectable 25 Gram(s) IV Push once  dextrose 50% Injectable 25 Gram(s) IV Push once  heparin  Infusion 750 Unit(s)/Hr (7.5 mL/Hr) IV Continuous <Continuous>  insulin glargine Injectable (LANTUS) 15 Unit(s) SubCutaneous every morning  insulin lispro Injectable (HumaLOG) 5 Unit(s) SubCutaneous three times a day before meals  lactobacillus acidophilus 1 Tablet(s) Oral three times a day  nafcillin  IVPB      nafcillin  IVPB 2 Gram(s) IV Intermittent every 4 hours  norepinephrine Infusion 0.05 MICROgram(s)/kG/Min (2.87 mL/Hr) IV Continuous <Continuous>  pantoprazole    Tablet 40 milliGRAM(s) Oral before breakfast  senna 2 Tablet(s) Oral at bedtime  sertraline 25 milliGRAM(s) Oral daily  sodium bicarbonate 650 milliGRAM(s) Oral every 6 hours  tiotropium 18 MICROgram(s) Capsule 1 Capsule(s) Inhalation daily  vasopressin Infusion 0.04 Unit(s)/Min (2.4 mL/Hr) IV Continuous <Continuous>    MEDICATIONS  (PRN):  acetaminophen   Tablet .. 650 milliGRAM(s) Oral every 6 hours PRN Moderate Pain (4 - 6)  dextrose 40% Gel 15 Gram(s) Oral once PRN Blood Glucose LESS THAN 70 milliGRAM(s)/deciliter  glucagon  Injectable 1 milliGRAM(s) IntraMuscular once PRN Glucose LESS THAN 70 milligrams/deciliter  guaifenesin/dextromethorphan  Syrup 10 milliLiter(s) Oral every 6 hours PRN Cough      New X-rays reviewed:                                                                                  ECHO    CXR interpreted by me:  Improved left effusion

## 2020-05-15 NOTE — PROGRESS NOTE ADULT - SUBJECTIVE AND OBJECTIVE BOX
I have personally seen and examined the patient.  I agree with the history and physical which I have reviewed and noted any changes below.  05-15-20 @ 11:38    PRE-OP DIAGNOSIS:    PROCEDURE:    Physician:  MD  Assistant:  MD    ANESTHESIA TYPE:  [  ]General Anesthesia  [ X] Sedation  [ X] Local/Regional    ESTIMATED BLOOD LOSS:       mL    CONDITION  [  ] Critical  [  ] Serious  [  ]Fair  [ X]Good    Specimens removed: none    IMplants: none    Complications: none      FINDINGS    full report to follow   no complications  no evidence of thrombus   pt referred to cardioversion of aflutter     Final report to follow.      POST-OP DIAGNOSIS          PLAN OF CARE  [ ] D/C Home today  [ ]  D/C in AM  [ ] Return to In-patient bed

## 2020-05-15 NOTE — CDI QUERY NOTE - NSCDIOTHERTXTBX_GEN_ALL_CORE_HH
Pt. w/ CHF and chronic montejo here for SOB.  Found to have pleural effusions and UTI  In ED:   ,  BP 60/44, WBC 3.48, Lactate 1.7  Blood Cx. - neg.   Urine Cx.  Staph latisha   Started Levaquin 750mg IVPB Stat w/ indication UTI    Nephro progress note 5/14 states:  "UTI/Sepsis"    Please document your determination of the status of Sepsis for this patient:   -   Sepsis present on admission  -    Sepsis developed during admission  -    Sepsis ruled out  -    Other (please specify)  -    Unable to determine    Thank you for your assistance in this matter. Pt. w/ CHF and chronic montejo here for SOB.  Found to have pleural effusions and UTI  In ED:   ,  BP 60/44, WBC 3.48, Lactate 1.7  Blood Cx. - neg.   Urine Cx.  Staph aureus  Started Levaquin 750mg IVPB Stat w/ indication UTI    Nephro progress note 5/14 states:  "UTI/Sepsis"    Please document your determination of the status of Sepsis for this patient:   -   Sepsis present on admission 2/2 _____________ (please specify)  -    Sepsis developed during admission  -    Sepsis ruled out  -    Other (please specify)  -    Unable to determine    Thank you for your assistance in this matter.

## 2020-05-15 NOTE — PROGRESS NOTE ADULT - SUBJECTIVE AND OBJECTIVE BOX
Patient well known to me and was called by daughter  DC cardioversion done 120 J after JAMILA done by Dr morris.  patient is in NSR  Will assume the cardiac care of the patient.

## 2020-05-15 NOTE — PROCEDURE NOTE - NSINFORMCONSENT_GEN_A_CORE
Benefits, risks, and possible complications of procedure explained to patient/caregiver who verbalized understanding and gave verbal consent.
Benefits, risks, and possible complications of procedure explained to patient/caregiver who verbalized understanding and gave written consent.

## 2020-05-16 NOTE — PROGRESS NOTE ADULT - SUBJECTIVE AND OBJECTIVE BOX
LENGTH OF HOSPITAL STAY: 5d    CHIEF COMPLAINT:   Patient is a 69y old  Female who presents with a chief complaint of sob (16 May 2020 09:01)      HISTORY OF PRESENTING ILLNESS:    HPI:  68 y/o female with pmh of DM, stomach ca, bladder prolapse, heart failure, s/p CABG 10/2019 presents complaining of abdominal distension for the past couple of days which has been worsening.  Patient states that her symptoms are similar to when she has urinary tract infections. She has had two other episodes of similar presenting symptoms since having an indwelling montejo catheter placed after her CABG (10/2019) as mentioned.  Patient was treated with cipro as outpt; reports decreased PO fluids since the symptoms began.  Patient denies fever/chills, abd pain, n/v/d, cough, chest pain, SOB. (12 May 2020 00:57)    PAST MEDICAL & SURGICAL HISTORY  PAST MEDICAL & SURGICAL HISTORY:  Heart failure  Female bladder prolapse  Diabetes  S/P CABG (coronary artery bypass graft)  History of repair of hip fracture    SOCIAL HISTORY:    ALLERGIES:  No Known Allergies    MEDICATIONS:  STANDING MEDICATIONS  aMIOdarone    Tablet 400 milliGRAM(s) Oral every 12 hours  aMIOdarone Infusion 1 mG/Min IV Continuous <Continuous>  ascorbic acid 500 milliGRAM(s) Oral daily  aspirin  chewable 81 milliGRAM(s) Oral daily  atorvastatin 40 milliGRAM(s) Oral at bedtime  chlorhexidine 4% Liquid 1 Application(s) Topical <User Schedule>  dextrose 5%. 1000 milliLiter(s) IV Continuous <Continuous>  dextrose 50% Injectable 12.5 Gram(s) IV Push once  dextrose 50% Injectable 25 Gram(s) IV Push once  dextrose 50% Injectable 25 Gram(s) IV Push once  heparin  Infusion 750 Unit(s)/Hr IV Continuous <Continuous>  insulin glargine Injectable (LANTUS) 15 Unit(s) SubCutaneous every morning  insulin lispro Injectable (HumaLOG) 5 Unit(s) SubCutaneous three times a day before meals  lactobacillus acidophilus 1 Tablet(s) Oral three times a day  nafcillin  IVPB      nafcillin  IVPB 2 Gram(s) IV Intermittent every 4 hours  norepinephrine Infusion 0.05 MICROgram(s)/kG/Min IV Continuous <Continuous>  pantoprazole    Tablet 40 milliGRAM(s) Oral before breakfast  senna 2 Tablet(s) Oral at bedtime  sertraline 25 milliGRAM(s) Oral daily  sodium bicarbonate 650 milliGRAM(s) Oral every 6 hours  tiotropium 18 MICROgram(s) Capsule 1 Capsule(s) Inhalation daily  vasopressin Infusion 0.04 Unit(s)/Min IV Continuous <Continuous>    PRN MEDICATIONS  acetaminophen   Tablet .. 650 milliGRAM(s) Oral every 6 hours PRN  aluminum hydroxide/magnesium hydroxide/simethicone Suspension 15 milliLiter(s) Oral once PRN  dextrose 40% Gel 15 Gram(s) Oral once PRN  glucagon  Injectable 1 milliGRAM(s) IntraMuscular once PRN  guaifenesin/dextromethorphan  Syrup 10 milliLiter(s) Oral every 6 hours PRN    VITALS:   T(F): 97.4  HR: 100  BP: 137/79  RR: 28  SpO2: 98%    LABS:                        9.5    3.85  )-----------( 103      ( 16 May 2020 05:50 )             29.2     05-16    134<L>  |  104  |  97<HH>  ----------------------------<  171<H>  4.0   |  15<L>  |  5.9<HH>    Ca    8.1<L>      16 May 2020 05:50  Mg     2.2     05-16      PTT - ( 16 May 2020 05:50 )  PTT:100.4 sec      Lactate, Blood: 0.7 mmol/L (05-16-20 @ 05:50)  Lactate, Blood: 0.8 mmol/L (05-15-20 @ 21:32)  Troponin T, Serum: 0.21 ng/mL <HH> (05-15-20 @ 12:10)      CARDIAC MARKERS ( 15 May 2020 12:10 )  x     / 0.21 ng/mL / x     / x     / x          RADIOLOGY:  < from: Xray Chest 1 View- PORTABLE-Routine (05.16.20 @ 07:42) >  Impression:    Stable bilateral parenchymal opacities/blunted costophrenic angles. No pneumothorax    < end of copied text >    PHYSICAL EXAM:  GEN: No acute distress  HEENT: AT, NC, PERRLA  LUNGS: decreased breath sounds over Left lung  HEART: S1/S2 present. RRR.   ABD: Soft, non-tender, non-distended. Bowel sounds present  EXT: edema 2+ , cyanosis, clubbing absent, ROM normal  NEURO: AAOX3

## 2020-05-16 NOTE — PROGRESS NOTE ADULT - ASSESSMENT
Impression:  Ischemic Cardiomyopathy  afibb  Proteinuric kidney disease  Hemodynamic Instability   UTI  metabolic acidosis    Plan:  2nd dialysis treatment today  will attempt 1 l fluid removal  next HD Monday

## 2020-05-16 NOTE — PROGRESS NOTE ADULT - SUBJECTIVE AND OBJECTIVE BOX
Patient is a 69y old  Female who presents with a chief complaint of sob (15 May 2020 14:19)        Over Night Events:  SP Cardioversion.  Now NSR.  SP HD.  No SOB at rest         ROS:     All ROS are negative except HPI         PHYSICAL EXAM    ICU Vital Signs Last 24 Hrs  T(C): 36.1 (16 May 2020 08:00), Max: 36.2 (15 May 2020 12:00)  T(F): 97 (16 May 2020 08:00), Max: 97.1 (15 May 2020 12:00)  HR: 90 (16 May 2020 08:45) (72 - 118)  BP: 113/67 (16 May 2020 08:45) (69/43 - 152/90)  BP(mean): 84 (16 May 2020 08:45) (55 - 122)  ABP: --  ABP(mean): --  RR: 17 (16 May 2020 08:45) (14 - 33)  SpO2: 98% (16 May 2020 08:45) (89% - 100%)      CONSTITUTIONAL:   Well nourished.  NAD    ENT:   Airway patent,   Mouth with normal mucosa.   No thrush    EYES:   Pupils equal,   Round and reactive to light.    CARDIAC:   Normal rate,   Regular rhythm.    No edema      Vascular:  Normal systolic impulse  No Carotid bruits    RESPIRATORY:   No wheezing  Bilateral BS  Normal chest expansion  Not tachypneic,  No use of accessory muscles    GASTROINTESTINAL:  Abdomen soft,   Non-tender,   No guarding,   + BS    GENITOURINARY  normal genitalia for sex  no edema    MUSCULOSKELETAL:   Range of motion is not limited,  No clubbing, cyanosis    NEUROLOGICAL:   Alert and oriented   No motor  deficits.    SKIN:   Skin normal color for race,   Warm and dry   No evidence of rash.    PSYCHIATRIC:   No apparent risk to self or others.    HEMATOLOGICAL:  No cervical  lymphadenopathy.  no inguinal lymphadenopathy      05-15-20 @ 07:01  -  05-16-20 @ 07:00  --------------------------------------------------------  IN:    heparin Infusion: 180 mL    IV PiggyBack: 100 mL    norepinephrine Infusion: 70 mL    vasopressin Infusion: 57.6 mL  Total IN: 407.6 mL    OUT:    Indwelling Catheter - Urethral: 90 mL    Other: 1000 mL  Total OUT: 1090 mL    Total NET: -682.4 mL      05-16-20 @ 07:01  -  05-16-20 @ 09:01  --------------------------------------------------------  IN:    heparin Infusion: 12 mL    norepinephrine Infusion: 3.4 mL    vasopressin Infusion: 4.8 mL  Total IN: 20.2 mL    OUT:  Total OUT: 0 mL    Total NET: 20.2 mL          LABS:                            9.5    3.85  )-----------( 103      ( 16 May 2020 05:50 )             29.2                                               05-16    134<L>  |  104  |  97<HH>  ----------------------------<  171<H>  4.0   |  15<L>  |  5.9<HH>    Ca    8.1<L>      16 May 2020 05:50  Mg     2.2     05-16        PTT - ( 16 May 2020 05:50 )  PTT:100.4 sec                                           CARDIAC MARKERS ( 15 May 2020 12:10 )  x     / 0.21 ng/mL / x     / x     / x                                                                                                                                                                              MEDICATIONS  (STANDING):  aMIOdarone    Tablet 400 milliGRAM(s) Oral every 12 hours  aMIOdarone Infusion 1 mG/Min (33.3 mL/Hr) IV Continuous <Continuous>  ascorbic acid 500 milliGRAM(s) Oral daily  aspirin  chewable 81 milliGRAM(s) Oral daily  atorvastatin 40 milliGRAM(s) Oral at bedtime  chlorhexidine 4% Liquid 1 Application(s) Topical <User Schedule>  dextrose 5%. 1000 milliLiter(s) (50 mL/Hr) IV Continuous <Continuous>  dextrose 50% Injectable 12.5 Gram(s) IV Push once  dextrose 50% Injectable 25 Gram(s) IV Push once  dextrose 50% Injectable 25 Gram(s) IV Push once  heparin  Infusion 750 Unit(s)/Hr (6 mL/Hr) IV Continuous <Continuous>  insulin glargine Injectable (LANTUS) 15 Unit(s) SubCutaneous every morning  insulin lispro Injectable (HumaLOG) 5 Unit(s) SubCutaneous three times a day before meals  lactobacillus acidophilus 1 Tablet(s) Oral three times a day  nafcillin  IVPB      nafcillin  IVPB 2 Gram(s) IV Intermittent every 4 hours  norepinephrine Infusion 0.05 MICROgram(s)/kG/Min (2.87 mL/Hr) IV Continuous <Continuous>  pantoprazole    Tablet 40 milliGRAM(s) Oral before breakfast  senna 2 Tablet(s) Oral at bedtime  sertraline 25 milliGRAM(s) Oral daily  sodium bicarbonate 650 milliGRAM(s) Oral every 6 hours  tiotropium 18 MICROgram(s) Capsule 1 Capsule(s) Inhalation daily  vasopressin Infusion 0.04 Unit(s)/Min (2.4 mL/Hr) IV Continuous <Continuous>    MEDICATIONS  (PRN):  acetaminophen   Tablet .. 650 milliGRAM(s) Oral every 6 hours PRN Moderate Pain (4 - 6)  aluminum hydroxide/magnesium hydroxide/simethicone Suspension 15 milliLiter(s) Oral once PRN Dyspepsia  dextrose 40% Gel 15 Gram(s) Oral once PRN Blood Glucose LESS THAN 70 milliGRAM(s)/deciliter  glucagon  Injectable 1 milliGRAM(s) IntraMuscular once PRN Glucose LESS THAN 70 milligrams/deciliter  guaifenesin/dextromethorphan  Syrup 10 milliLiter(s) Oral every 6 hours PRN Cough      New X-rays reviewed:                                                                                  ECHO    CXR interpreted by me:

## 2020-05-16 NOTE — PROGRESS NOTE ADULT - ASSESSMENT
70 y/o female with pmh of DM, stomach ca, bladder prolapse, heart failure, s/p CABG 10/2019 presents complaining of abdominal distension for the past couple of days which has been worsening.     Patient assessed this morning. Not in any acute distress. No significant events overnight.    Pressors : Levo 0.08 mcg, Vasopressin 0.04mcg      # Acute Cardiogenic shock in setting Atrial Flutter :  - 2D-Echo reviewed, EF is 20-30% as per cardiac fellow. Only moderate stenosis of aortic valve.  - s/p DVVC 5/15, preceded by JAMILA that was negative for any blood clots  - Repeat 2D- echo ordered  - s/p Amio infusion , on PO amio for 2 weeks ( staring 5/14), switch to Amio 200 mg daily from 5/27  - Patient started on heparin gtt (post DCCV anti-coagulation)  - BNP of 341[address], cardiac enzymes elevated, repeat levels are stable  - Patient is hypotensive, requiring pressors. Go up on Vaso and try to wean off Levophed.  - EKG shows sinus rhythm , HR ~ 80-90s)  - Holding HF meds for now - carvedilol, Valtessa, Ivabradine, Enteresto, NO LOPRESSOR AS PER Dr. SY)  - Heart failure specialist's recommendations appreciated.    # RIC on CKD stage 4, creatinine baseline 3  secondary to ATN and cardiogenic shock  - SCr 5.3--> 5.8 --> 6.0 , baseline ~ 0.9  - [place] cath placed, CVVHD started 5/15, second session today, Next HD planned for Monday  - anuria, producing 20-30 cc of urine overnight  - did not respond to lasix challenge (180 mg) , 75 cc of urine output  - UA noted for Eosinophils, patient was on cipro before admission for UTI, possible ATN  - FeNa > 1%  - Also US abdomen showed echogenic material around mon[place]jo cath in U. bladder, will need repeat scan with distended bladder once pt is stable enough  - Nephro Recs appreciated, patient started on PO bicarb    # UTI in setting of chronic Montejo :  - UA positive, UCx positive for ALLEN  - will change IV vanco and Rocephin to , IV nafcillin 2g Q4hrs 5/14.    # Rt Pleural effusion  - Thora done on admission, 600ml of fluid drained, looked transudative, Repeated again on 5/14, 650 ml removed  - patient not in distress though    # H/O GI bleed (stable)  - patient had episode of lynn in October 2019, patient required blood transfusion at that time  - according to patient a colonoscopy was done and a "rectal ulcer " was found  - patient has been getting IV Iron infusion every 2-3 months since that time  -  # DM2  - blood glucose monitoring  - insulin regimen started    # bladder prolapse  - chronic issues, follow up outpatient    Activity: as tolerated  diet: Nephro/fluid restriction  dvt ppx: heparin 5k tid  gi ppx: on ppi  full code  dispo: from home.

## 2020-05-16 NOTE — PROGRESS NOTE ADULT - SUBJECTIVE AND OBJECTIVE BOX
[facility] FOLLOW UP NOTE  --------------------------------------------------------------------------------  24 hour events/subjective:  underwent first HD treatment  remains on pressors  complains of nausea and decreased PO    PAST HISTORY  --------------------------------------------------------------------------------  No significant changes to PMH, PSH, FHx, SHx, unless otherwise noted    ALLERGIES & MEDICATIONS  --------------------------------------------------------------------------------  Allergies    No Known Allergies    Intolerances      Standing Inpatient Medications  aMIOdarone    Tablet 400 milliGRAM(s) Oral every 12 hours  aMIOdarone Infusion 1 mG/Min IV Continuous <Continuous>  ascorbic acid 500 milliGRAM(s) Oral daily  aspirin  chewable 81 milliGRAM(s) Oral daily  atorvastatin 40 milliGRAM(s) Oral at bedtime  chlorhexidine 4% Liquid 1 Application(s) Topical <User Schedule>  dextrose 5%. 1000 milliLiter(s) IV Continuous <Continuous>  dextrose 50% Injectable 12.5 Gram(s) IV Push once  dextrose 50% Injectable 25 Gram(s) IV Push once  dextrose 50% Injectable 25 Gram(s) IV Push once  heparin  Infusion 750 Unit(s)/Hr IV Continuous <Continuous>  insulin glargine Injectable (LANTUS) 15 Unit(s) SubCutaneous every morning  insulin lispro Injectable (HumaLOG) 5 Unit(s) SubCutaneous three times a day before meals  lactobacillus acidophilus 1 Tablet(s) Oral three times a day  nafcillin  IVPB      nafcillin  IVPB 2 Gram(s) IV Intermittent every 4 hours  norepinephrine Infusion 0.05 MICROgram(s)/kG/Min IV Continuous <Continuous>  pantoprazole    Tablet 40 milliGRAM(s) Oral before breakfast  senna 2 Tablet(s) Oral at bedtime  sertraline 25 milliGRAM(s) Oral daily  sodium bicarbonate 650 milliGRAM(s) Oral every 6 hours  tiotropium 18 MICROgram(s) Capsule 1 Capsule(s) Inhalation daily  vasopressin Infusion 0.04 Unit(s)/Min IV Continuous <Continuous>    PRN Inpatient Medications  acetaminophen   Tablet .. 650 milliGRAM(s) Oral every 6 hours PRN  aluminum hydroxide/magnesium hydroxide/simethicone Suspension 15 milliLiter(s) Oral once PRN  dextrose 40% Gel 15 Gram(s) Oral once PRN  glucagon  Injectable 1 milliGRAM(s) IntraMuscular once PRN  guaifenesin/dextromethorphan  Syrup 10 milliLiter(s) Oral every 6 hours PRN      REVIEW OF SYSTEMS  --------------------------------------------------------------------------------  as above    VITALS/PHYSICAL EXAM  --------------------------------------------------------------------------------  T(C): 36.1 (05-16-20 @ 08:00), Max: 36.2 (05-15-20 @ 12:00)  HR: 90 (05-16-20 @ 08:30) (72 - 118)  BP: 104/65 (05-16-20 @ 08:30) (69/43 - 152/90)  RR: 19 (05-16-20 @ 08:30) (14 - 33)  SpO2: 98% (05-16-20 @ 08:30) (89% - 100%)  Wt(kg): --  Height (cm): 157.48 (05-15-20 @ 11:45)  Weight (kg): 61.2 (05-15-20 @ 11:45)  BMI (kg/m2): 24.7 (05-15-20 @ 11:45)  BSA (m2): 1.62 (05-15-20 @ 11:45)      05-15-20 @ 07:01  -  05-16-20 @ 07:00  --------------------------------------------------------  IN: 407.6 mL / OUT: 1090 mL / NET: -682.4 mL    05-16-20 @ 07:01  -  05-16-20 @ 08:45  --------------------------------------------------------  IN: 20.2 mL / OUT: 0 mL / NET: 20.2 mL      Physical Exam:  	Gen: mildly tacypneic lying flat  	HEENT: + JVD  	Pulm: decreased BS  	CV: HR irreg, +m  	Abd: +BS, soft, nontender/nondistended  	tr - 1+ edema  	Vascular access: right femoral temporary    LABS/STUDIES  --------------------------------------------------------------------------------              9.5    3.85  >-----------<  103      [05-16-20 @ 05:50]              29.2     134  |  104  |  97  ----------------------------<  171      [05-16-20 @ 05:50]  4.0   |  15  |  5.9        Ca     8.1     [05-16-20 @ 05:50]      Mg     2.2     [05-16-20 @ 05:50]    VBG 7.21/43/38 HCO3 17   Troponin 0.21      [05-15-20 @ 12:10]    Creatinine Trend:  SCr 5.9 [05-16 @ 05:50]  SCr 6.0 [05-15 @ 05:02]  SCr 5.8 [05-14 @ 04:50]  SCr 5.3 [05-13 @ 03:40]  SCr 4.9 [05-12 @ 04:14]    Urinalysis - [05-11-20 @ 22:30]      Color Yellow / Appearance Turbid / SG 1.005 / pH 7.0      Gluc Negative / Ketone Negative  / Bili Negative / Urobili <2 mg/dL       Blood Moderate / Protein 100 mg/dL / Leuk Est Large / Nitrite Negative      RBC 3 / WBC >720 / Hyaline 8 / Gran  / Sq Epi  / Non Sq Epi 3 / Bacteria Moderate    Urine Creatinine 102      [05-12-20 @ 05:50]  Urine Protein 465      [05-12-20 @ 05:50]  P/C 4.6  Urine Sodium 59.0      [05-12-20 @ 05:50]  Urine Urea Nitrogen 215      [05-12-20 @ 05:50]  Urine Phosphorus 11      [05-12-20 @ 05:50]  Urine Osmolality 319      [05-12-20 @ 05:50]    Iron 24, TIBC 204, %sat 12      [08-30-19 @ 07:16]  Ferritin 112      [08-30-19 @ 07:16]  HbA1c 5.7      [11-09-19 @ 01:20]  TSH 0.58      [08-02-19 @ 18:37]  Lipid: chol 153, , HDL 45, LDL 75      [08-30-19 @ 07:16]    CT scan - distended gallbladder with layering sediment  anasarca with ascites and Right effusion    Renal US - collapsed bladder with debris

## 2020-05-16 NOTE — PROGRESS NOTE ADULT - SUBJECTIVE AND OBJECTIVE BOX
SUBJ:  Feels better    MEDICATIONS  (STANDING):  aMIOdarone    Tablet 400 milliGRAM(s) Oral every 12 hours  aMIOdarone Infusion 1 mG/Min (33.3 mL/Hr) IV Continuous <Continuous>  ascorbic acid 500 milliGRAM(s) Oral daily  aspirin  chewable 81 milliGRAM(s) Oral daily  atorvastatin 40 milliGRAM(s) Oral at bedtime  chlorhexidine 4% Liquid 1 Application(s) Topical <User Schedule>  dextrose 5%. 1000 milliLiter(s) (50 mL/Hr) IV Continuous <Continuous>  dextrose 50% Injectable 12.5 Gram(s) IV Push once  dextrose 50% Injectable 25 Gram(s) IV Push once  dextrose 50% Injectable 25 Gram(s) IV Push once  heparin  Infusion 400 Unit(s)/Hr (4 mL/Hr) IV Continuous <Continuous>  insulin glargine Injectable (LANTUS) 15 Unit(s) SubCutaneous every morning  insulin lispro Injectable (HumaLOG) 5 Unit(s) SubCutaneous three times a day before meals  lactobacillus acidophilus 1 Tablet(s) Oral three times a day  nafcillin  IVPB      nafcillin  IVPB 2 Gram(s) IV Intermittent every 4 hours  norepinephrine Infusion 0.05 MICROgram(s)/kG/Min (2.87 mL/Hr) IV Continuous <Continuous>  pantoprazole    Tablet 40 milliGRAM(s) Oral before breakfast  senna 2 Tablet(s) Oral at bedtime  sertraline 25 milliGRAM(s) Oral daily  sodium bicarbonate 650 milliGRAM(s) Oral every 6 hours  tiotropium 18 MICROgram(s) Capsule 1 Capsule(s) Inhalation daily  vasopressin Infusion 0.04 Unit(s)/Min (2.4 mL/Hr) IV Continuous <Continuous>    MEDICATIONS  (PRN):  acetaminophen   Tablet .. 650 milliGRAM(s) Oral every 6 hours PRN Moderate Pain (4 - 6)  aluminum hydroxide/magnesium hydroxide/simethicone Suspension 15 milliLiter(s) Oral once PRN Dyspepsia  dextrose 40% Gel 15 Gram(s) Oral once PRN Blood Glucose LESS THAN 70 milliGRAM(s)/deciliter  glucagon  Injectable 1 milliGRAM(s) IntraMuscular once PRN Glucose LESS THAN 70 milligrams/deciliter  guaifenesin/dextromethorphan  Syrup 10 milliLiter(s) Oral every 6 hours PRN Cough            Vital Signs Last 24 Hrs  T(C): 36.3 (16 May 2020 12:00), Max: 36.3 (16 May 2020 09:16)  T(F): 97.4 (16 May 2020 12:00), Max: 97.4 (16 May 2020 09:16)  HR: 90 (16 May 2020 15:30) (82 - 102)  BP: 72/47 (16 May 2020 15:30) (69/43 - 138/80)  BP(mean): 54 (16 May 2020 15:30) (54 - 104)  RR: 22 (16 May 2020 15:30) (13 - 35)  SpO2: 99% (16 May 2020 15:30) (90% - 100%)     REVIEW OF SYSTEMS:  CONSTITUTIONAL: No fever, weight loss, or fatigue  CARDIOLOGY: PAtient denies chest pain, shortness of breath or syncopal episodes.   RESPIRATORY: denies shortness of breath, wheezeing.   NEUROLOGICAL: NO weakness, no focal deficits to report.  ENDOCRINOLOGICAL: no recent change in diabetic medications.   GI: no BRBPR, no N,V,diarrhea.    PSYCHIATRY: normal mood and affect  HEENT: no nasal discharge, no ecchymosis  SKIN: no ecchymosis, no breakdown  MUSCULOSKELETAL: Full range of motion x4.        PHYSICAL EXAM:  · CONSTITUTIONAL:	Well-developed, well nourished    BMI-  ·RESPIRATORY:   airway patent; breath sounds equal; good air movement; respirations non-labored; clear to auscultation bilaterally; no chest wall tenderness; no intercostal retractions; no rales,rhonchi or wheeze  · CARDIOVASCULAR	regular rate and rhythm  no rub  no murmur  normal PMI  · EXTREMITIES: No cyanosis, clubbing or edema  · VASCULAR: 	Equal and normal pulses (carotid, femoral, dorsalis pedis)  	  TELEMETRY:    ECG:    TTE:    LABS:                        9.5    3.85  )-----------( 103      ( 16 May 2020 05:50 )             29.2     05-16    134<L>  |  104  |  97<HH>  ----------------------------<  171<H>  4.0   |  15<L>  |  5.9<HH>    Ca    8.1<L>      16 May 2020 05:50  Mg     2.2     05-16      CARDIAC MARKERS ( 15 May 2020 12:10 )  x     / 0.21 ng/mL / x     / x     / x          PTT - ( 16 May 2020 12:50 )  PTT:180.5 sec    I&O's Summary    15 May 2020 07:01  -  16 May 2020 07:00  --------------------------------------------------------  IN: 407.6 mL / OUT: 1090 mL / NET: -682.4 mL    16 May 2020 07:01  -  16 May 2020 15:39  --------------------------------------------------------  IN: 338 mL / OUT: 1030 mL / NET: -692 mL      BNP  RADIOLOGY & ADDITIONAL STUDIES:    IMPRESSION AND PLAN:    Post cardioversion in NSR  RIC  low BP  gentle hydration

## 2020-05-16 NOTE — PROGRESS NOTE ADULT - ASSESSMENT
IMPRESSION:    Shock likely cardiogenic  HO CAD  Aflutter SP cardioversion   VHD  HFREF   UTI.  MSSA with HO Chronic Castellon     PLAN:    CNS: No depressants     HEENT: Oral care    PULMONARY:  HOB @ 45 degrees.  Aspiration precautions.  VBG for Ph.      CARDIOVASCULAR: Wean Levophed. FU with cardiology and EP.        GI: GI prophylaxis.  Feeding  Bowel regimen     RENAL:  Follow up lytes.  Correct as needed.  FU Renal.  NaHCo3 oral. HD per renal      INFECTIOUS DISEASE: Follow up cultures. Continue Nafcillin.        HEMATOLOGICAL:  DVT prophylaxis.  FU PTT     ENDOCRINE:  Follow up FS.  Insulin protocol if needed.    MUSCULOSKELETAL:  Bed chair position     ICU for now

## 2020-05-17 NOTE — PROGRESS NOTE ADULT - SUBJECTIVE AND OBJECTIVE BOX
[facility] FOLLOW UP NOTE  --------------------------------------------------------------------------------  24 hour events/subjective:  clinical status improving  appetite better      PAST HISTORY  --------------------------------------------------------------------------------  No significant changes to PMH, PSH, FHx, SHx, unless otherwise noted    ALLERGIES & MEDICATIONS  --------------------------------------------------------------------------------  Allergies    No Known Allergies    Intolerances      Standing Inpatient Medications  aMIOdarone    Tablet 400 milliGRAM(s) Oral every 12 hours  aMIOdarone Infusion 1 mG/Min IV Continuous <Continuous>  ascorbic acid 500 milliGRAM(s) Oral daily  aspirin  chewable 81 milliGRAM(s) Oral daily  atorvastatin 40 milliGRAM(s) Oral at bedtime  chlorhexidine 4% Liquid 1 Application(s) Topical <User Schedule>  dextrose 5%. 1000 milliLiter(s) IV Continuous <Continuous>  dextrose 50% Injectable 12.5 Gram(s) IV Push once  dextrose 50% Injectable 25 Gram(s) IV Push once  dextrose 50% Injectable 25 Gram(s) IV Push once  heparin  Infusion 400 Unit(s)/Hr IV Continuous <Continuous>  insulin glargine Injectable (LANTUS) 15 Unit(s) SubCutaneous every morning  insulin lispro Injectable (HumaLOG) 5 Unit(s) SubCutaneous three times a day before meals  lactobacillus acidophilus 1 Tablet(s) Oral three times a day  metoprolol tartrate 12.5 milliGRAM(s) Oral two times a day  nafcillin  IVPB      nafcillin  IVPB 2 Gram(s) IV Intermittent every 4 hours  norepinephrine Infusion 0.05 MICROgram(s)/kG/Min IV Continuous <Continuous>  pantoprazole    Tablet 40 milliGRAM(s) Oral before breakfast  potassium chloride  20 mEq/100 mL IVPB 20 milliEquivalent(s) IV Intermittent every 2 hours  sertraline 25 milliGRAM(s) Oral daily  sodium bicarbonate 650 milliGRAM(s) Oral every 6 hours  tiotropium 18 MICROgram(s) Capsule 1 Capsule(s) Inhalation daily  vasopressin Infusion 0.04 Unit(s)/Min IV Continuous <Continuous>    PRN Inpatient Medications  acetaminophen   Tablet .. 650 milliGRAM(s) Oral every 6 hours PRN  aluminum hydroxide/magnesium hydroxide/simethicone Suspension 15 milliLiter(s) Oral once PRN  dextrose 40% Gel 15 Gram(s) Oral once PRN  glucagon  Injectable 1 milliGRAM(s) IntraMuscular once PRN  guaifenesin/dextromethorphan  Syrup 10 milliLiter(s) Oral every 6 hours PRN      REVIEW OF SYSTEMS  --------------------------------------------------------------------------------  All other systems were reviewed and are negative, except as noted.    VITALS/PHYSICAL EXAM  --------------------------------------------------------------------------------  T(C): 36.1 (05-17-20 @ 04:00), Max: 36.3 (05-16-20 @ 12:00)  HR: 104 (05-17-20 @ 07:00) (82 - 104)  BP: 108/69 (05-17-20 @ 07:00) (72/47 - 143/78)  RR: 23 (05-17-20 @ 07:00) (13 - 35)  SpO2: 97% (05-17-20 @ 07:00) (91% - 100%)  Wt(kg): --  Height (cm): 157.48 (05-15-20 @ 11:45)  Weight (kg): 61.2 (05-15-20 @ 11:45)  BMI (kg/m2): 24.7 (05-15-20 @ 11:45)  BSA (m2): 1.62 (05-15-20 @ 11:45)      05-16-20 @ 07:01  -  05-17-20 @ 07:00  --------------------------------------------------------  IN: 447.5 mL / OUT: 1135 mL / NET: -687.5 mL    05-17-20 @ 07:01  -  05-17-20 @ 09:17  --------------------------------------------------------  IN: 4 mL / OUT: 0 mL / NET: 4 mL      Physical Exam:  	Gen: NAD,   	HEENT: + JVD  	Pulm: decreased BS bilat  	CV: HR irreg  	Abd: +BS, soft, nontender/nondistended  	tr edema  	Vascular access: femoral    urine output remains minimal    Urine Cx - Staph aureus    Blood Cx - negative    LABS/STUDIES  --------------------------------------------------------------------------------              9.1    2.84  >-----------<  82       [05-17-20 @ 04:30]              27.5     140  |  106  |  62  ----------------------------<  50      [05-17-20 @ 04:30]  3.3   |  20  |  4.8        Ca     7.9     [05-17-20 @ 04:30]      Mg     2.2     [05-16-20 @ 05:50]      Creatinine Trend:  SCr 4.8 [05-17 @ 04:30]  SCr 5.9 [05-16 @ 05:50]  SCr 6.0 [05-15 @ 05:02]  SCr 5.8 [05-14 @ 04:50]  SCr 5.3 [05-13 @ 03:40]    Urinalysis - [05-11-20 @ 22:30]      Color Yellow / Appearance Turbid / SG 1.005 / pH 7.0      Gluc Negative / Ketone Negative  / Bili Negative / Urobili <2 mg/dL       Blood Moderate / Protein 100 mg/dL / Leuk Est Large / Nitrite Negative      RBC 3 / WBC >720 / Hyaline 8 / Gran  / Sq Epi  / Non Sq Epi 3 / Bacteria Moderate    Urine Creatinine 102      [05-12-20 @ 05:50]  Urine Protein 465      [05-12-20 @ 05:50]  Urine Sodium 59.0      [05-12-20 @ 05:50]  Urine Urea Nitrogen 215      [05-12-20 @ 05:50]  Urine Phosphorus 11      [05-12-20 @ 05:50]  Urine Osmolality 319      [05-12-20 @ 05:50]    Iron 24, TIBC 204, %sat 12      [08-30-19 @ 07:16]  Ferritin 112      [08-30-19 @ 07:16]  HbA1c 5.7      [11-09-19 @ 01:20]  TSH 0.58      [08-02-19 @ 18:37]  Lipid: chol 153, , HDL 45, LDL 75      [08-30-19 @ 07:16]

## 2020-05-17 NOTE — PROGRESS NOTE ADULT - SUBJECTIVE AND OBJECTIVE BOX
Patient is a 69y old  Female who presents with a chief complaint of sob (16 May 2020 15:38)        Over Night Events:  Off Pressors.  No SOB at rest.  On 3 liters O2.          ROS:     All ROS are negative except HPI         PHYSICAL EXAM    ICU Vital Signs Last 24 Hrs  T(C): 36.1 (17 May 2020 04:00), Max: 36.3 (16 May 2020 09:16)  T(F): 97 (17 May 2020 04:00), Max: 97.4 (16 May 2020 09:16)  HR: 104 (17 May 2020 07:00) (82 - 104)  BP: 108/69 (17 May 2020 07:00) (72/47 - 143/78)  BP(mean): 87 (17 May 2020 07:00) (54 - 105)  ABP: --  ABP(mean): --  RR: 23 (17 May 2020 07:00) (13 - 35)  SpO2: 97% (17 May 2020 07:00) (91% - 100%)      CONSTITUTIONAL:  Well nourished.  NAD    ENT:   Airway patent,   Mouth with normal mucosa.   No thrush    EYES:   Pupils equal,   Round and reactive to light.    CARDIAC:   Normal rate,   Regular rhythm.    No edema      Vascular:  Normal systolic impulse  No Carotid bruits    RESPIRATORY:   No wheezing  Bilateral BS  Normal chest expansion  Not tachypneic,  No use of accessory muscles    GASTROINTESTINAL:  Abdomen soft,   Non-tender,   No guarding,   + BS    GENITOURINARY  normal genitalia for sex  no edema    MUSCULOSKELETAL:   Range of motion is not limited,  No clubbing, cyanosis    NEUROLOGICAL:   Alert and oriented   No motor  deficits.    SKIN:   Skin normal color for race,   Warm and dry and intact.   No evidence of rash.    PSYCHIATRIC:   No apparent risk to self or others.    HEMATOLOGICAL:  No cervical  lymphadenopathy.  no inguinal lymphadenopathy      05-16-20 @ 07:01  -  05-17-20 @ 07:00  --------------------------------------------------------  IN:    heparin Infusion: 64 mL    heparin Infusion: 42 mL    IV PiggyBack: 200 mL    norepinephrine Infusion: 33.9 mL    Oral Fluid: 50 mL    vasopressin Infusion: 57.6 mL  Total IN: 447.5 mL    OUT:    Indwelling Catheter - Urethral: 135 mL    Other: 1000 mL  Total OUT: 1135 mL    Total NET: -687.5 mL      05-17-20 @ 07:01  -  05-17-20 @ 07:56  --------------------------------------------------------  IN:    heparin Infusion: 4 mL  Total IN: 4 mL    OUT:  Total OUT: 0 mL    Total NET: 4 mL          LABS:                            9.1    2.84  )-----------( 82       ( 17 May 2020 04:30 )             27.5              9.1    2.84  )-----------( 82       ( 05-17 @ 04:30 )             27.5                9.5    3.85  )-----------( 103      ( 05-16 @ 05:50 )             29.2                10.2   4.40  )-----------( 104      ( 05-15 @ 05:02 )             32.6                                                     05-17    140  |  106  |  62<HH>  ----------------------------<  50<L>  3.3<L>   |  20  |  4.8<HH>    Ca    7.9<L>      17 May 2020 04:30  Mg     2.2     05-16        PTT - ( 17 May 2020 04:30 )  PTT:42.0 sec                                           CARDIAC MARKERS ( 15 May 2020 12:10 )  x     / 0.21 ng/mL / x     / x     / x                                                                                                                                                                              MEDICATIONS  (STANDING):  aMIOdarone    Tablet 400 milliGRAM(s) Oral every 12 hours  aMIOdarone Infusion 1 mG/Min (33.3 mL/Hr) IV Continuous <Continuous>  ascorbic acid 500 milliGRAM(s) Oral daily  aspirin  chewable 81 milliGRAM(s) Oral daily  atorvastatin 40 milliGRAM(s) Oral at bedtime  chlorhexidine 4% Liquid 1 Application(s) Topical <User Schedule>  dextrose 5%. 1000 milliLiter(s) (50 mL/Hr) IV Continuous <Continuous>  dextrose 50% Injectable 12.5 Gram(s) IV Push once  dextrose 50% Injectable 25 Gram(s) IV Push once  dextrose 50% Injectable 25 Gram(s) IV Push once  heparin  Infusion 400 Unit(s)/Hr (5 mL/Hr) IV Continuous <Continuous>  insulin glargine Injectable (LANTUS) 15 Unit(s) SubCutaneous every morning  insulin lispro Injectable (HumaLOG) 5 Unit(s) SubCutaneous three times a day before meals  lactobacillus acidophilus 1 Tablet(s) Oral three times a day  nafcillin  IVPB      nafcillin  IVPB 2 Gram(s) IV Intermittent every 4 hours  norepinephrine Infusion 0.05 MICROgram(s)/kG/Min (2.87 mL/Hr) IV Continuous <Continuous>  pantoprazole    Tablet 40 milliGRAM(s) Oral before breakfast  sertraline 25 milliGRAM(s) Oral daily  sodium bicarbonate 650 milliGRAM(s) Oral every 6 hours  tiotropium 18 MICROgram(s) Capsule 1 Capsule(s) Inhalation daily  vasopressin Infusion 0.04 Unit(s)/Min (2.4 mL/Hr) IV Continuous <Continuous>    MEDICATIONS  (PRN):  acetaminophen   Tablet .. 650 milliGRAM(s) Oral every 6 hours PRN Moderate Pain (4 - 6)  aluminum hydroxide/magnesium hydroxide/simethicone Suspension 15 milliLiter(s) Oral once PRN Dyspepsia  dextrose 40% Gel 15 Gram(s) Oral once PRN Blood Glucose LESS THAN 70 milliGRAM(s)/deciliter  glucagon  Injectable 1 milliGRAM(s) IntraMuscular once PRN Glucose LESS THAN 70 milligrams/deciliter  guaifenesin/dextromethorphan  Syrup 10 milliLiter(s) Oral every 6 hours PRN Cough      New X-rays reviewed:                                                                                  ECHO    CXR interpreted by me:  Enlarged heart

## 2020-05-17 NOTE — PROGRESS NOTE ADULT - ASSESSMENT
IMPRESSION:    Shock likely cardiogenic resolved   HO CAD  Aflutter SP cardioversion   VHD  HFREF   UTI.  MSSA with HO Chronic Castellon   Pancytopenia     PLAN:    CNS: No depressants     HEENT: Oral care    PULMONARY:  HOB @ 45 degrees.  Aspiration precautions.  VBG for Ph.      CARDIOVASCULAR: Wean Levophed. FU with cardiology and EP.   Lopressor 12.5 Q112     GI: GI prophylaxis.  Feeding  Bowel regimen     RENAL:  Follow up lytes.  Correct as needed.  FU Renal.  NaHCO3 oral.      INFECTIOUS DISEASE: Follow up cultures. Continue Nafcillin.        HEMATOLOGICAL:  DVT prophylaxis.  FU PTT.  Hem Onc     ENDOCRINE:  Follow up FS.  Insulin protocol if needed.    MUSCULOSKELETAL:  Bed chair position     ICU for now

## 2020-05-17 NOTE — PROGRESS NOTE ADULT - ASSESSMENT
Impression:  Proteinuric kidney Dz prob secondary to DM  Cardiorenal Syndrome  Sepsis secondary to UTI - improved  acidosis -  improved  hypokalemia    Plan:  decrease NaHCO3 to q 12hr   HD tomorrow on 3K bath - 3hrs, 2L fluid removal

## 2020-05-18 NOTE — DIETITIAN INITIAL EVALUATION ADULT. - ADD RECOMMEND
If patient to be on HD, please add RENAL RESTRICTION to current DASH/TLC, Carbohydrate COnsistent with snack, and 1000mL fluid restriction. (2) If patient to be off HD, continue current diet for now, no further intervention.

## 2020-05-18 NOTE — DIETITIAN INITIAL EVALUATION ADULT. - ENERGY NEEDS
[phone] kcal/day (MSJ x 1.2-1.3) v.s. ~1835 kcal/day (~30kcal/kg of ABW) for possible HD  61-79g/day (1.0-1.3 g/kg of ABW) v.s. 73-92g/day (1.2-1.5 g/kg of ABW) for possible HD  Fluid per ICU team

## 2020-05-18 NOTE — DIETITIAN INITIAL EVALUATION ADULT. - ENERGY INTAKE
Suspecting fair eating pattern at this time comparing what pt reported v.s. RN reported. Fair (>50%)

## 2020-05-18 NOTE — PROGRESS NOTE ADULT - SUBJECTIVE AND OBJECTIVE BOX
INTERVAL HPI/OVERNIGHT EVENTS:  Patient in SR, short run NSVT on tele. Patient without complaints. On heparin gtt.    MEDICATIONS  (STANDING):  aMIOdarone    Tablet 400 milliGRAM(s) Oral every 12 hours  aMIOdarone Infusion 1 mG/Min (33.3 mL/Hr) IV Continuous <Continuous>  ascorbic acid 500 milliGRAM(s) Oral daily  aspirin  chewable 81 milliGRAM(s) Oral daily  atorvastatin 40 milliGRAM(s) Oral at bedtime  chlorhexidine 4% Liquid 1 Application(s) Topical <User Schedule>  dextrose 5%. 1000 milliLiter(s) (50 mL/Hr) IV Continuous <Continuous>  heparin  Infusion 800 Unit(s)/Hr (7 mL/Hr) IV Continuous <Continuous>  insulin glargine Injectable (LANTUS) 15 Unit(s) SubCutaneous every morning  insulin lispro Injectable (HumaLOG) 5 Unit(s) SubCutaneous three times a day before meals  lactobacillus acidophilus 1 Tablet(s) Oral three times a day  metoprolol tartrate 12.5 milliGRAM(s) Oral two times a day  pantoprazole    Tablet 40 milliGRAM(s) Oral before breakfast  sertraline 25 milliGRAM(s) Oral daily  sodium bicarbonate 650 milliGRAM(s) Oral every 12 hours  tiotropium 18 MICROgram(s) Capsule 1 Capsule(s) Inhalation daily    MEDICATIONS  (PRN):  acetaminophen   Tablet .. 650 milliGRAM(s) Oral every 6 hours PRN Moderate Pain (4 - 6)  aluminum hydroxide/magnesium hydroxide/simethicone Suspension 15 milliLiter(s) Oral once PRN Dyspepsia  dextrose 40% Gel 15 Gram(s) Oral once PRN Blood Glucose LESS THAN 70 milliGRAM(s)/deciliter  glucagon  Injectable 1 milliGRAM(s) IntraMuscular once PRN Glucose LESS THAN 70 milligrams/deciliter  guaifenesin/dextromethorphan  Syrup 10 milliLiter(s) Oral every 6 hours PRN Cough      Allergies  No Known Allergies    REVIEW OF SYSTEMS  A ten-point review of systems was otherwise negative except as noted.      Vital Signs Last 24 Hrs  T(C): 37 (18 May 2020 08:00), Max: 37 (18 May 2020 04:00)  T(F): 98.6 (18 May 2020 08:00), Max: 98.6 (18 May 2020 04:00)  HR: 94 (18 May 2020 11:00) (82 - 100)  BP: 105/67 (18 May 2020 11:00) (77/50 - 107/69)  BP(mean): 78 (18 May 2020 11:00) (55 - 84)  RR: 20 (18 May 2020 11:00) (3 - 31)  SpO2: 96% (18 May 2020 11:00) (92% - 96%)    05-17-20 @ 07:01  -  05-18-20 @ 07:00  --------------------------------------------------------  IN: 991.2 mL / OUT: 115 mL / NET: 876.2 mL    05-18-20 @ 07:01  -  05-18-20 @ 12:10  --------------------------------------------------------  IN: 288 mL / OUT: 40 mL / NET: 248 mL      Physical Exam  GENERAL: In no apparent distress  HEART: Regular rate and rhythm; No murmurs, rubs, or gallops.  PULMONARY: Clear to auscultation and perfusion.  No rales, wheezing, or rhonchi bilaterally.  ABDOMEN: Soft, Nontender, Nondistended; Bowel sounds present  EXTREMITIES:  2+ Peripheral Pulses, No clubbing, cyanosis, or edema  NEUROLOGICAL: Grossly nonfocal. AO x3.     LABS:                        9.4    4.18  )-----------( 87       ( 18 May 2020 05:15 )             28.8     05-18    136  |  104  |  68<HH>  ----------------------------<  60<L>  4.3   |  17  |  5.3<HH>    Ca    8.0<L>      18 May 2020 05:15    TPro  5.1<L>  /  Alb  3.1<L>  /  TBili  0.4  /  DBili  x   /  AST  8   /  ALT  5   /  AlkPhos  47  05-18    PTT - ( 18 May 2020 00:40 )  PTT:98.8 sec      RADIOLOGY & ADDITIONAL TESTS:  < from: Transthoracic Echocardiogram (05.17.20 @ 07:10) >  Summary:   1. Left ventricular ejection fraction, by visual estimation, is 20 to 25%.   2. Severely decreased global left ventricular systolic function.   3. The mitral valve leaflets are tethered due to reduced systolic function and elevated LVDP.   4. Moderate mitral annular calcification.   5. Moderate-to-severe mitral regurgitation.   6. Peak transaortic gradient equals 25.2 mmHg, mean transaortic gradient equals 8.8 mmHg, the calculated aortic valve area equals 1.07 cm² by the continuity equation consistent with moderate aortic stenosis (possible LFLG).   7. Estimated JENNIFER = 1.24 cm2 by planimetry.   8. Trivial aortic regurgitation.   9. Moderate tricuspid regurgitation.  10. Estimated pulmonary artery systolic pressure is 44.5 mmHg assuming a right atrial pressure of 8 mmHg, which is consistent with mild pulmonary hypertension.  11. LA volume Index is 71.0 ml/m² ml/m2.  12. Bilateral pleural effusions.    PHYSICIAN INTERPRETATION:  Left Ventricle: The left ventricular internal cavity size is normal. Left ventricular wall thickness is normal. Global LV systolic function was severely decreased. Left ventricular ejection fraction, by visual estimation, is 20 to 25%.  Right Ventricle: The right ventricular size is mildly enlarged. RV systolic function is mildly reduced.  Left Atrium: Severely enlarged left atrium. LA volume Index is 71.0 ml/m² ml/m2.  Right Atrium: Mildly enlarged right atrium.  Pericardium: There is no evidence of pericardial effusion.  Mitral Valve: The mitral valve leaflets are tethered which is due to reduced systolic function and elevated LVDP. There is moderate mitral annular calcification. Moderate to severe mitral valve regurgitation is seen.  Tricuspid Valve: The tricuspid valve is normal in structure. Moderate tricuspid regurgitation is visualized. Estimated pulmonary artery systolic pressure is 44.5 mmHg assuming a right atrial pressure of 8 mmHg, which is consistent with mild pulmonary hypertension.  Aortic Valve: The aortic valve is trileaflet. Sclerotic aortic valve with decreased opening. Peak transaortic gradient equals 25.2 mmHg, mean transaortic gradient equals 8.8 mmHg, the calculated aortic valve area equals 1.07 cm² by the continuity equation consistent with moderate aortic stenosis. Estimated JENNIFER = 1.24 cm2 by planimetry. Trivial aortic regurgitation.  Pulmonic Valve: The pulmonic valve is normal. Mild pulmonic valve regurgitation.  Aorta: Aortic root measured at sinotubular junction is normal.  Venous: The inferior venacava was normal sized, with respiratory size variation less than 50%.    < end of copied text >    < from: 12 Lead ECG (05.11.20 @ 19:44) >  Ventricular Rate 129 BPM    Atrial Rate 61 BPM    QRS Duration 116 ms    Q-T Interval 354 ms    QTC Calculation(Bezet) 518 ms    R Axis -54 degrees    T Axis 132 degrees    Diagnosis Line Sinus tachycardia  Left axis deviation  Left anterior fascicular block  Low voltage QRS  Possible Anterolateral infarct , age undetermined  Abnormal ECG    < end of copied text >

## 2020-05-18 NOTE — PROGRESS NOTE ADULT - ASSESSMENT
70 y/o female with pmh of DM, stomach ca, bladder prolapse, heart failure, s/p CABG 10/2019 presents complaining of abdominal distension for the past couple of days which has been worsening.     Patient assessed this morning. Not in any acute distress. No significant events overnight.    Pressors : Levo 0.08 mcg, Vasopressin 0.04mcg      # Acute Cardiogenic shock in setting Atrial Flutter :  - 2D-Echo reviewed, EF is 20-30% as per cardiac fellow. Only moderate stenosis of aortic valve.  - s/p DVVC 5/15, preceded by JAMILA that was negative for any blood clots  - Repeat 2D- echo ordered. Repeat Echo showed same findings  - s/p Amio infusion , on PO amio for 2 weeks ( staring 5/14), switch to Amio 200 mg daily from 5/27  - Patient started on heparin gtt (post DCCV anti-coagulation)  - [place] [address], cardiac enzymes elevated, repeat levels are stable  - Patient is hypotensive, She has been off pressors, MAP is still holding up.   - Holding HF meds for now - carvedilol, Valtessa, Ivabradine, Enteresto,   - Heart failure specialist's recommendations appreciated.  -Patient is still mildly tachycardic 100-105). The patient is more on the dry side and she is already on lopressor 12.5. We will challenge her with 250 cc of LR bolus and reassess the heart rate, If improved then no change in the medications. If it is still high we will consider increasing lopressor to 25 BID     # RIC on CKD stage 4, creatinine baseline 3  secondary to ATN and cardiogenic shock  - SCr 5.3--> 5.8 --> 6.0 , baseline ~ 0.9  - [place] cath placed, CVVHD started 5/15, second session today, Next HD planned for Monday  - anuria, producing 20-30 cc of urine overnight  - did not respond to lasix challenge (180 mg) , 75 cc of urine output  - UA noted for Eosinophils, patient was on cipro before admission for UTI, possible ATN  - FeNa > 1%  - Also US abdomen showed echogenic material around [place] cath in U. bladder, will need repeat scan with distended bladder once pt is stable enough  - Nephro Recs appreciated, patient started on PO bicarb    # UTI in setting of chronic [place] :  - UA positive, UCx positive for ALLEN  - Completed a full course of ABX. d/c nafcillin today     # Rt Pleural effusion  - Thora done on admission, 600ml of fluid drained, looked transudative, Repeated again on 5/14, 650 ml removed  - patient not in distress though    # H/O GI bleed (stable)  - patient had episode of lynn in October 2019, patient required blood transfusion at that time  - according to patient a colonoscopy was done and a "rectal ulcer " was found  - patient has been getting IV Iron infusion every 2-3 months since that time    # DM2  - blood glucose monitoring  - insulin regimen started    # bladder prolapse  - chronic issues, follow up outpatient    #A- flutter   -Heart rate is better controlled now   -On Lopressor 12.5 mg BID   -still slightly tachycardic, Plan as above  -Anticoagulate with Heparin IV, decrease the rate from 8-->7 ml / hour    Activity: as tolerated  diet: Nephro/fluid restriction  dvt ppx: NA  gi ppx: on ppi  full code  dispo: from home.

## 2020-05-18 NOTE — DIETITIAN INITIAL EVALUATION ADULT. - OTHER INFO
p/w SOB with abd distension for few days. Off pressors, no SOB, on 3L o2, shock likely cardiogenic resolved. a flutter s/p cardioversion. VHD. pancytopenia. Nephro consulted. acidosis improved. HD 5/18?

## 2020-05-18 NOTE — DIETITIAN INITIAL EVALUATION ADULT. - DIET TYPE
Per pt, she eats well at home 3 months ago, but then lost her appetite, no idea why, consuming <50% of her usual intake. No vitamin/supplement. NKFA. UBW around 122# 3 months ago, but now with 134# per EMR. Possible water retention? but no edema noted at this time.

## 2020-05-18 NOTE — CONSULT NOTE ADULT - ASSESSMENT
70 y/o female with pmh of DM, bladder prolapse, heart failure, s/p CABG 10/2019 presents complaining of abdominal distension. She is being treated for Shock likely cardiogenic that has now resolved, RIC on HD, CHF exacerbation/ fluid  overload/ MSSA UTI on Abx.    We are consulted for pancytopenia       1. Pancytopenia is likely multifactorial from BM suppression from shock/infection and antibiotic use   Unlikely to be HIT- low probability with score of 1    Will review peripheral smear  Can check HIV and hepatitis panel to complete work up  Supportive measures for now. Monitor CBC. Transfuse plt if active bleeding or less than 20K  On heparin drip for Afib- ok to do heparin as long as the plt count is more than 50k     2. Chronic normocytic anemia likely multifactorial from CAD/CHF, now RIC requiting HD   Baseline Hb 8-10  Check iron studies, ferritin, B12, folate   Check ret count, LDH, haptoglobin  Given normocytic anemia and RIC- will also recommend MM work up- SPEP, immunofixation, free light chain, UPEP    DVT ppx- on heparin drip for Afib 70 y/o female with pmh of DM, bladder prolapse, heart failure, s/p CABG 10/2019 presents complaining of abdominal distension. She is being treated for Shock likely cardiogenic that has now resolved, RIC on HD, CHF exacerbation/ fluid  overload/ MSSA UTI on Abx.    We are consulted for pancytopenia       1. Pancytopenia is likely multifactorial from BM suppression from shock/infection and antibiotic use   Unlikely to be HIT- low probability with score of 1  Peripheral smear- decreased number of platelets. No clumps noted. [place] noted. No blasts like cells/schistocytes noted   Will review peripheral smear  Can check HIV and hepatitis panel to complete work up  Supportive measures for now. Monitor CBC. Transfuse plt if active bleeding or less than 20K  On heparin drip for Afib- ok to do heparin as long as the plt count is more than 50k     2. Chronic normocytic anemia likely multifactorial from CAD/CHF, now RIC requiting HD   Baseline Hb 8-10  Check iron studies, ferritin, B12, folate   Check ret count, LDH, haptoglobin  Given normocytic anemia and RIC- will also recommend MM work up- SPEP, immunofixation, free light chain, UPEP    DVT ppx- on heparin drip for Afib    Plan discussed with ICU resident 68 y/o female with pmh of DM, bladder prolapse, heart failure, s/p CABG 10/2019 presents complaining of abdominal distension. She is being treated for Shock likely cardiogenic that has now resolved, RIC on HD, CHF exacerbation/ fluid  overload/ MSSA UTI on Abx.    We are consulted for pancytopenia       1. Pancytopenia is likely multifactorial from BM suppression from shock/infection and antibiotic use   Unlikely to be HIT- low probability with score of 1  Peripheral smear- decreased number of platelets. No clumps noted. [place] noted. No blasts like cells/schistocytes noted   Can check HIV and hepatitis panel to complete work up  Supportive measures for now. Monitor CBC. Transfuse plt if active bleeding or less than 20K  On heparin drip for Afib- ok to do heparin as long as the plt count is more than 50k     2. Chronic normocytic anemia likely multifactorial from CAD/CHF, now RIC requiting HD   Baseline Hb 8-10  Check iron studies, ferritin, B12, folate   Check ret count, LDH, haptoglobin  Given normocytic anemia and RIC- will also recommend MM work up- SPEP, immunofixation, free light chain, UPEP    DVT ppx- on heparin drip for Afib    Plan discussed with ICU resident

## 2020-05-18 NOTE — PROGRESS NOTE ADULT - PROBLEM SELECTOR PLAN 1
Decreased LVEF from ~ 45 to 15% likely related to atrial flutter with RVR   She remains fluid overloaded   Continue HD for volume management   Metoprolol tartrate 12.5 mg every 6 hrs   Strict I/Os  Daily weight

## 2020-05-18 NOTE — PROGRESS NOTE ADULT - ASSESSMENT
68 y/o F with h/o CAD s/p CABG, AF/flutter, recurrent UTI/permanent Castellon, chronic systolic heart failure admitted in mixed shock septic/cardiogenic. S/p thoracentesis, patient found to be in atrial flutter s/p DCCV, now on heparin gtt. LVEF dropped to 30 from ~ 45% about 2 months ago. POC US showed LVEF ~ 15%. Patient was started on hemodialysis, off pressors today.

## 2020-05-18 NOTE — PROGRESS NOTE ADULT - SUBJECTIVE AND OBJECTIVE BOX
Patient is a 69y old  Female who presents with a chief complaint of sob (18 May 2020 08:46)        Over Night Events:  on RA.  off pressors.  No complaints         ROS:     All ROS are negative except HPI         PHYSICAL EXAM    ICU Vital Signs Last 24 Hrs  T(C): 37 (18 May 2020 08:00), Max: 37 (18 May 2020 04:00)  T(F): 98.6 (18 May 2020 08:00), Max: 98.6 (18 May 2020 04:00)  HR: 94 (18 May 2020 08:00) (82 - 98)  BP: 77/50 (18 May 2020 08:00) (77/50 - 107/69)  BP(mean): 55 (18 May 2020 08:00) (55 - 88)  ABP: --  ABP(mean): --  RR: 19 (18 May 2020 08:00) (3 - 31)  SpO2: 94% (18 May 2020 08:00) (92% - 99%)      CONSTITUTIONAL:  Well nourished.  NAD    ENT:   Airway patent,   Mouth with normal mucosa.   No thrush    EYES:   Pupils equal,   Round and reactive to light.    CARDIAC:   Tachycardic   Regular rhythm.    No edema      Vascular:  Normal systolic impulse  No Carotid bruits    RESPIRATORY:   No wheezing  Bilateral BS  Normal chest expansion  Not tachypneic,  No use of accessory muscles    GASTROINTESTINAL:  Abdomen soft,   Non-tender,   No guarding,   + BS    GENITOURINARY  normal genitalia for sex  no edema    MUSCULOSKELETAL:   Range of motion is not limited,  No clubbing, cyanosis    NEUROLOGICAL:   Alert and oriented   No motor  deficits.    SKIN:   Skin normal color for race,   Warm and dry  No evidence of rash.    PSYCHIATRIC:   Normal mood and affect.   No apparent risk to self or others.    HEMATOLOGICAL:  No cervical  lymphadenopathy.  no inguinal lymphadenopathy      05-17-20 @ 07:01  -  05-18-20 @ 07:00  --------------------------------------------------------  IN:    heparin Infusion: 24 mL    heparin Infusion: 96 mL    heparin Infusion: 40 mL    IV PiggyBack: 800 mL    vasopressin Infusion: 31.2 mL  Total IN: 991.2 mL    OUT:    Indwelling Catheter - Urethral: 115 mL  Total OUT: 115 mL    Total NET: 876.2 mL      05-18-20 @ 07:01  -  05-18-20 @ 08:52  --------------------------------------------------------  IN:    heparin Infusion: 16 mL  Total IN: 16 mL    OUT:    Indwelling Catheter - Urethral: 10 mL  Total OUT: 10 mL    Total NET: 6 mL          LABS:                            9.4    4.18  )-----------( 87       ( 18 May 2020 05:15 )             28.8                     9.4    4.18  )-----------( 87       ( 05-18 @ 05:15 )             28.8                9.1    2.84  )-----------( 82       ( 05-17 @ 04:30 )             27.5                9.5    3.85  )-----------( 103      ( 05-16 @ 05:50 )             29.2                                              05-18    136  |  104  |  68<HH>  ----------------------------<  60<L>  4.3   |  17  |  5.3<HH>    18 May 2020 05:15    136    |  104    |  68<HH>  ----------------------------<  60<L>  4.3     |  17     |  5.3<HH>  17 May 2020 16:00    138    |  105    |  63<HH>  ----------------------------<  122<H>  4.8     |  20     |  4.8<HH>    Ca    8.0<L>      18 May 2020 05:15  Ca    7.8<L>      17 May 2020 16:00    TPro  5.1<L>  /  Alb  3.1<L>  /  TBili  0.4    /  DBili  x      /  AST  8      /  ALT  5      /  AlkPhos  47     18 May 2020 05:15      Ca    8.0<L>      18 May 2020 05:15    TPro  5.1<L>  /  Alb  3.1<L>  /  TBili  0.4  /  DBili  x   /  AST  8   /  ALT  5   /  AlkPhos  47  05-18      PTT - ( 18 May 2020 00:40 )  PTT:98.8 sec                                                                                     LIVER FUNCTIONS - ( 18 May 2020 05:15 )  Alb: 3.1 g/dL / Pro: 5.1 g/dL / ALK PHOS: 47 U/L / ALT: 5 U/L / AST: 8 U/L / GGT: x                                                  GI PCR Panel, Stool (collected 17 May 2020 12:38)  Source: .Stool Feces  Final Report (18 May 2020 02:38):    GI PCR Results: NOT detected    *******Please Note:*******    GI panel PCR evaluates for:    Campylobacter, Plesiomonas shigelloides, Salmonella,    Vibrio, Yersinia enterocolitica, Enteroaggregative    Escherichia coli (EAEC), Enteropathogenic E.coli (EPEC),    Enterotoxigenic E. coli (ETEC) lt/st, Shiga-like    toxin-producing E. coli (STEC) stx1/stx2,    Shigella/ Enteroinvasive E. coli (EIEC), Cryptosporidium,    Cyclospora cayetanensis, Entamoeba histolytica,    Giardia lamblia, Adenovirus F 40/41, Astrovirus,    Norovirus GI/GII, Rotavirus A, Sapovirus                                                                                           MEDICATIONS  (STANDING):  aMIOdarone    Tablet 400 milliGRAM(s) Oral every 12 hours  aMIOdarone Infusion 1 mG/Min (33.3 mL/Hr) IV Continuous <Continuous>  ascorbic acid 500 milliGRAM(s) Oral daily  aspirin  chewable 81 milliGRAM(s) Oral daily  atorvastatin 40 milliGRAM(s) Oral at bedtime  chlorhexidine 4% Liquid 1 Application(s) Topical <User Schedule>  dextrose 5%. 1000 milliLiter(s) (50 mL/Hr) IV Continuous <Continuous>  dextrose 50% Injectable 12.5 Gram(s) IV Push once  dextrose 50% Injectable 25 Gram(s) IV Push once  dextrose 50% Injectable 25 Gram(s) IV Push once  heparin  Infusion 800 Unit(s)/Hr (8 mL/Hr) IV Continuous <Continuous>  insulin glargine Injectable (LANTUS) 15 Unit(s) SubCutaneous every morning  insulin lispro Injectable (HumaLOG) 5 Unit(s) SubCutaneous three times a day before meals  lactobacillus acidophilus 1 Tablet(s) Oral three times a day  metoprolol tartrate 12.5 milliGRAM(s) Oral two times a day  nafcillin  IVPB      nafcillin  IVPB 2 Gram(s) IV Intermittent every 4 hours  norepinephrine Infusion 0.05 MICROgram(s)/kG/Min (2.87 mL/Hr) IV Continuous <Continuous>  pantoprazole    Tablet 40 milliGRAM(s) Oral before breakfast  sertraline 25 milliGRAM(s) Oral daily  sodium bicarbonate 650 milliGRAM(s) Oral every 12 hours  tiotropium 18 MICROgram(s) Capsule 1 Capsule(s) Inhalation daily  vasopressin Infusion 0.04 Unit(s)/Min (2.4 mL/Hr) IV Continuous <Continuous>    MEDICATIONS  (PRN):  acetaminophen   Tablet .. 650 milliGRAM(s) Oral every 6 hours PRN Moderate Pain (4 - 6)  aluminum hydroxide/magnesium hydroxide/simethicone Suspension 15 milliLiter(s) Oral once PRN Dyspepsia  dextrose 40% Gel 15 Gram(s) Oral once PRN Blood Glucose LESS THAN 70 milliGRAM(s)/deciliter  glucagon  Injectable 1 milliGRAM(s) IntraMuscular once PRN Glucose LESS THAN 70 milligrams/deciliter  guaifenesin/dextromethorphan  Syrup 10 milliLiter(s) Oral every 6 hours PRN Cough      New X-rays reviewed:                                                                                  ECHO    CXR interpreted by me:

## 2020-05-18 NOTE — CONSULT NOTE ADULT - ATTENDING COMMENTS
agree with above note follow up as OP
The patient was seen. Agree with above.   New onset thrombocytopenia is most likely multifactorial from acute illness, BM suppression and medication. Blood smear reviewed. There is no schistocytosis, no PLT clumping and no abnormal cells.   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    -- Monitor CBC daily.   -- Anemia work: check iron study, B12, Folate, Retic count, LDH, Haptoglobin and MM panel.  -- On Hepatin drip for a-fib. Monitor PLT. May need to hold Heparin if PLT < 50K.

## 2020-05-18 NOTE — PROGRESS NOTE ADULT - SUBJECTIVE AND OBJECTIVE BOX
24H events:    Patient is a 69y old Female who presents with a chief complaint of sob (18 May 2020 08:51)    Primary diagnosis of CHF (congestive heart failure)     Today is hospital day 7d.     PAST MEDICAL & SURGICAL HISTORY  Heart failure  Female bladder prolapse  Diabetes  S/P CABG (coronary artery bypass graft)  History of repair of hip fracture    SOCIAL HISTORY:  Negative for smoking/alcohol/drug use.     ALLERGIES:  No Known Allergies    MEDICATIONS:  STANDING MEDICATIONS  aMIOdarone    Tablet 400 milliGRAM(s) Oral every 12 hours  aMIOdarone Infusion 1 mG/Min IV Continuous <Continuous>  ascorbic acid 500 milliGRAM(s) Oral daily  aspirin  chewable 81 milliGRAM(s) Oral daily  atorvastatin 40 milliGRAM(s) Oral at bedtime  chlorhexidine 4% Liquid 1 Application(s) Topical <User Schedule>  dextrose 5%. 1000 milliLiter(s) IV Continuous <Continuous>  dextrose 50% Injectable 12.5 Gram(s) IV Push once  dextrose 50% Injectable 25 Gram(s) IV Push once  dextrose 50% Injectable 25 Gram(s) IV Push once  heparin  Infusion 800 Unit(s)/Hr IV Continuous <Continuous>  insulin glargine Injectable (LANTUS) 15 Unit(s) SubCutaneous every morning  insulin lispro Injectable (HumaLOG) 5 Unit(s) SubCutaneous three times a day before meals  lactobacillus acidophilus 1 Tablet(s) Oral three times a day  metoprolol tartrate 12.5 milliGRAM(s) Oral two times a day  pantoprazole    Tablet 40 milliGRAM(s) Oral before breakfast  sertraline 25 milliGRAM(s) Oral daily  sodium bicarbonate 650 milliGRAM(s) Oral every 12 hours  tiotropium 18 MICROgram(s) Capsule 1 Capsule(s) Inhalation daily    PRN MEDICATIONS  acetaminophen   Tablet .. 650 milliGRAM(s) Oral every 6 hours PRN  aluminum hydroxide/magnesium hydroxide/simethicone Suspension 15 milliLiter(s) Oral once PRN  dextrose 40% Gel 15 Gram(s) Oral once PRN  glucagon  Injectable 1 milliGRAM(s) IntraMuscular once PRN  guaifenesin/dextromethorphan  Syrup 10 milliLiter(s) Oral every 6 hours PRN    VITALS:   T(F): 98.6  HR: 100  BP: 81/54  RR: 28  SpO2: 93%    LABS:                        9.4    4.18  )-----------( 87       ( 18 May 2020 05:15 )             28.8     05-18    136  |  104  |  68<HH>  ----------------------------<  60<L>  4.3   |  17  |  5.3<HH>    Ca    8.0<L>      18 May 2020 05:15    TPro  5.1<L>  /  Alb  3.1<L>  /  TBili  0.4  /  DBili  x   /  AST  8   /  ALT  5   /  AlkPhos  47  05-18    PTT - ( 18 May 2020 00:40 )  PTT:98.8 sec          GI PCR Panel, Stool (collected 17 May 2020 12:38)  Source: .Stool Feces  Final Report (18 May 2020 02:38):    GI PCR Results: NOT detected    *******Please Note:*******    GI panel PCR evaluates for:    Campylobacter, Plesiomonas shigelloides, Salmonella,    Vibrio, Yersinia enterocolitica, Enteroaggregative    Escherichia coli (EAEC), Enteropathogenic E.coli (EPEC),    Enterotoxigenic E. coli (ETEC) lt/st, Shiga-like    toxin-producing E. coli (STEC) stx1/stx2,    Shigella/ Enteroinvasive E. coli (EIEC), Cryptosporidium,    Cyclospora cayetanensis, Entamoeba histolytica,    Giardia lamblia, Adenovirus F 40/41, Astrovirus,    Norovirus GI/GII, Rotavirus A, Sapovirus        PHYSICAL EXAM:  General: NAD, on RA, no complaints  Heart: S1,S2 appreciated, could not hear S3, S4 or any murmurs, Tachycardic, Regular rate  Chest: Equal bilateral, air entry, no wheeze  Abdomen: Soft, nondistended, BS +  Lower extremities: No edema, no cyanosis

## 2020-05-18 NOTE — PROGRESS NOTE ADULT - SUBJECTIVE AND OBJECTIVE BOX
[facility] FOLLOW UP NOTE  --------------------------------------------------------------------------------  24 hour events/subjective: Patient examined. Appears comfortable.    PAST HISTORY  --------------------------------------------------------------------------------  No significant changes to PMH, PSH, FHx, SHx, unless otherwise noted    ALLERGIES & MEDICATIONS  --------------------------------------------------------------------------------  Allergies    No Known Allergies    Standing Inpatient Medications  aMIOdarone    Tablet 400 milliGRAM(s) Oral every 12 hours  aMIOdarone Infusion 1 mG/Min IV Continuous <Continuous>  ascorbic acid 500 milliGRAM(s) Oral daily  aspirin  chewable 81 milliGRAM(s) Oral daily  atorvastatin 40 milliGRAM(s) Oral at bedtime  chlorhexidine 4% Liquid 1 Application(s) Topical <User Schedule>  dextrose 5%. 1000 milliLiter(s) IV Continuous <Continuous>  dextrose 50% Injectable 12.5 Gram(s) IV Push once  dextrose 50% Injectable 25 Gram(s) IV Push once  dextrose 50% Injectable 25 Gram(s) IV Push once  heparin  Infusion 800 Unit(s)/Hr IV Continuous <Continuous>  insulin glargine Injectable (LANTUS) 15 Unit(s) SubCutaneous every morning  insulin lispro Injectable (HumaLOG) 5 Unit(s) SubCutaneous three times a day before meals  lactobacillus acidophilus 1 Tablet(s) Oral three times a day  metoprolol tartrate 12.5 milliGRAM(s) Oral two times a day  pantoprazole    Tablet 40 milliGRAM(s) Oral before breakfast  sertraline 25 milliGRAM(s) Oral daily  sodium bicarbonate 650 milliGRAM(s) Oral every 12 hours  tiotropium 18 MICROgram(s) Capsule 1 Capsule(s) Inhalation daily    PRN Inpatient Medications  acetaminophen   Tablet .. 650 milliGRAM(s) Oral every 6 hours PRN  aluminum hydroxide/magnesium hydroxide/simethicone Suspension 15 milliLiter(s) Oral once PRN  dextrose 40% Gel 15 Gram(s) Oral once PRN  glucagon  Injectable 1 milliGRAM(s) IntraMuscular once PRN  guaifenesin/dextromethorphan  Syrup 10 milliLiter(s) Oral every 6 hours PRN      VITALS/PHYSICAL EXAM  --------------------------------------------------------------------------------  T(C): 37 (05-18-20 @ 08:00), Max: 37 (05-18-20 @ 04:00)  HR: 94 (05-18-20 @ 11:00) (82 - 100)  BP: 105/67 (05-18-20 @ 11:00) (77/50 - 107/69)  RR: 20 (05-18-20 @ 11:00) (3 - 31)  SpO2: 96% (05-18-20 @ 11:00) (92% - 96%)  Wt(kg): --  Height (cm): 157.48 (05-18-20 @ 11:40)    05-17-20 @ 07:01  -  05-18-20 @ 07:00  --------------------------------------------------------  IN: 991.2 mL / OUT: 115 mL / NET: 876.2 mL    05-18-20 @ 07:01  -  05-18-20 @ 12:23  --------------------------------------------------------  IN: 288 mL / OUT: 40 mL / NET: 248 mL    Physical Exam:  	Gen: NAD  	Pulm: CTA B/L  	CV: RRR, S1S2  	Abd: +BS, soft, nontender/nondistended  	: No suprapubic tenderness  	LE: Warm,  edema    LABS/STUDIES  --------------------------------------------------------------------------------              9.4    4.18  >-----------<  87       [05-18-20 @ 05:15]              28.8     136  |  104  |  68  ----------------------------<  60      [05-18-20 @ 05:15]  4.3   |  17  |  5.3        Ca     8.0     [05-18-20 @ 05:15]    TPro  5.1  /  Alb  3.1  /  TBili  0.4  /  DBili  x   /  AST  8   /  ALT  5   /  AlkPhos  47  [05-18-20 @ 05:15]    PTT: 98.8       [05-18-20 @ 00:40]    Creatinine Trend:  SCr 5.3 [05-18 @ 05:15]  SCr 4.8 [05-17 @ 16:00]  SCr 4.8 [05-17 @ 04:30]  SCr 5.9 [05-16 @ 05:50]  SCr 6.0 [05-15 @ 05:02]    Urinalysis - [05-11-20 @ 22:30]      Color Yellow / Appearance Turbid / SG 1.005 / pH 7.0      Gluc Negative / Ketone Negative  / Bili Negative / Urobili <2 mg/dL       Blood Moderate / Protein 100 mg/dL / Leuk Est Large / Nitrite Negative      RBC 3 / WBC >720 / Hyaline 8 / Gran  / Sq Epi  / Non Sq Epi 3 / Bacteria Moderate    Urine Creatinine 102      [05-12-20 @ 05:50]  Urine Protein 465      [05-12-20 @ 05:50]  Urine Sodium 59.0      [05-12-20 @ 05:50]  Urine Urea Nitrogen 215      [05-12-20 @ 05:50]  Urine Phosphorus 11      [05-12-20 @ 05:50]  Urine Osmolality 319      [05-12-20 @ 05:50]    Iron 24, TIBC 204, %sat 12      [08-30-19 @ 07:16]  Ferritin 112      [08-30-19 @ 07:16]  HbA1c 5.7      [11-09-19 @ 01:20]  TSH 0.58      [08-02-19 @ 18:37]  Lipid: chol 153, , HDL 45, LDL 75      [08-30-19 @ 07:16]

## 2020-05-18 NOTE — DIETITIAN INITIAL EVALUATION ADULT. - PERTINENT MEDS FT
heparin, aspirin, abx, insulin, metoprolol, na-bicarb, levophed, pressor, acetaminophen, atorvastatin, acidophilus, protonix,

## 2020-05-18 NOTE — CONSULT NOTE ADULT - I WAS PHYSICALLY PRESENT FOR THE KEY PORTIONS OF THE EVALUATION AND MANAGEMENT (E/M) SERVICE PROVIDED.  I AGREE WITH THE ABOVE HISTORY, PHYSICAL, AND PLAN WHICH I HAVE REVIEWED AND EDITED WHERE APPROPRIATE
Not all labs are in computer will call pt when they are. Pt does have appt on 4/21/17.   
Statement Selected
Statement Selected

## 2020-05-18 NOTE — CONSULT NOTE ADULT - SUBJECTIVE AND OBJECTIVE BOX
Patient is a 69y old  Female who presents with a chief complaint of sob (18 May 2020 08:51)      HPI:  70 y/o female with pmh of DM, bladder prolapse, heart failure, s/p CABG 10/2019 presented with abdominal distension for few days PTP.  Patient states that her symptoms are similar to when she has urinary tract infections. She has had two other episodes of similar presenting symptoms since having an indwelling montejo catheter placed after her CABG (10/2019) as mentioned.  Patient was treated with cipro as outpt; reports decreased PO fluids since the symptoms began.  Patient denies fever/chills, abd pain, n/v/d, cough, chest pain, SOB. (12 May 2020 00:57)     We are consulted for pancytopenia. She is now admitted for Shock likely cardiogenic that has now resolved, RIC on HD, CHF exacerbation/ fluid  overload/ MSSA UTI on Abx. On review of [facility] Her CBC was normal back in 2019 except for anemia with Hb between 8 to 10 with normal MCV. He presented with plt count of 120 on admission that slowly trended down to 87 today. Her creatinine was normal before, however was elevated to 1.7 in April 2020         ROS:  Negative except for:    PAST MEDICAL & SURGICAL HISTORY:  Heart failure  Female bladder prolapse  Diabetes  S/P CABG (coronary artery bypass graft)  History of repair of hip fracture      SOCIAL HISTORY: Quit smoking more than 20 years ago  No alcohol abuse     FAMILY HISTORY:  FH: myocardial infarction (Mother)  FH: stomach cancer (Mother) at age 34      MEDICATIONS  (STANDING):  aMIOdarone    Tablet 400 milliGRAM(s) Oral every 12 hours  aMIOdarone Infusion 1 mG/Min (33.3 mL/Hr) IV Continuous <Continuous>  ascorbic acid 500 milliGRAM(s) Oral daily  aspirin  chewable 81 milliGRAM(s) Oral daily  atorvastatin 40 milliGRAM(s) Oral at bedtime  chlorhexidine 4% Liquid 1 Application(s) Topical <User Schedule>  dextrose 5%. 1000 milliLiter(s) (50 mL/Hr) IV Continuous <Continuous>  dextrose 50% Injectable 12.5 Gram(s) IV Push once  dextrose 50% Injectable 25 Gram(s) IV Push once  dextrose 50% Injectable 25 Gram(s) IV Push once  heparin  Infusion 800 Unit(s)/Hr (8 mL/Hr) IV Continuous <Continuous>  insulin glargine Injectable (LANTUS) 15 Unit(s) SubCutaneous every morning  insulin lispro Injectable (HumaLOG) 5 Unit(s) SubCutaneous three times a day before meals  lactobacillus acidophilus 1 Tablet(s) Oral three times a day  metoprolol tartrate 12.5 milliGRAM(s) Oral two times a day  pantoprazole    Tablet 40 milliGRAM(s) Oral before breakfast  sertraline 25 milliGRAM(s) Oral daily  sodium bicarbonate 650 milliGRAM(s) Oral every 12 hours  tiotropium 18 MICROgram(s) Capsule 1 Capsule(s) Inhalation daily    MEDICATIONS  (PRN):  acetaminophen   Tablet .. 650 milliGRAM(s) Oral every 6 hours PRN Moderate Pain (4 - 6)  aluminum hydroxide/magnesium hydroxide/simethicone Suspension 15 milliLiter(s) Oral once PRN Dyspepsia  dextrose 40% Gel 15 Gram(s) Oral once PRN Blood Glucose LESS THAN 70 milliGRAM(s)/deciliter  glucagon  Injectable 1 milliGRAM(s) IntraMuscular once PRN Glucose LESS THAN 70 milligrams/deciliter  guaifenesin/dextromethorphan  Syrup 10 milliLiter(s) Oral every 6 hours PRN Cough      Allergies    No Known Allergies    Intolerances        Vital Signs Last 24 Hrs  T(C): 37 (18 May 2020 08:00), Max: 37 (18 May 2020 04:00)  T(F): 98.6 (18 May 2020 08:00), Max: 98.6 (18 May 2020 04:00)  HR: 100 (18 May 2020 09:00) (82 - 100)  BP: 81/54 (18 May 2020 09:00) (77/50 - 107/69)  BP(mean): 62 (18 May 2020 09:00) (55 - 84)  RR: 28 (18 May 2020 09:00) (3 - 31)  SpO2: 93% (18 May 2020 09:00) (92% - 99%)    PHYSICAL EXAM  General: adult in NAD, Looks comfortable   Neck: supple  CV: normal S1/S2   Lungs: positive air movement b/l ant lungs. No accessory muscle use   Abdomen: soft non-tender   Neuro: alert and oriented X 4, no focal deficits      LABS:                          9.4    4.18  )-----------( 87       ( 18 May 2020 05:15 )             28.8         Mean Cell Volume : 88.9 fL  Mean Cell Hemoglobin : 29.0 pg  Mean Cell Hemoglobin Concentration : 32.6 g/dL  Auto Neutrophil # : 2.86 K/uL  Auto Lymphocyte # : 0.73 K/uL  Auto Monocyte # : 0.47 K/uL  Auto Eosinophil # : 0.10 K/uL  Auto Basophil # : 0.00 K/uL  Auto Neutrophil % : 68.4 %  Auto Lymphocyte % : 17.5 %  Auto Monocyte % : 11.2 %  Auto Eosinophil % : 2.4 %  Auto Basophil % : 0.0 %      Serial CBC's  05-18 @ 05:15  Hct-28.8 / Hgb-9.4 / Plat-87 / RBC-3.24 / WBC-4.18  Serial CBC's  05-17 @ 04:30  Hct-27.5 / Hgb-9.1 / Plat-82 / RBC-3.08 / WBC-2.84  Serial CBC's  05-16 @ 05:50  Hct-29.2 / Hgb-9.5 / Plat-103 / RBC-3.25 / WBC-3.85  Serial CBC's  05-15 @ 05:02  Hct-32.6 / Hgb-10.2 / Plat-104 / RBC-3.58 / WBC-4.40      05-18    136  |  104  |  68<HH>  ----------------------------<  60<L>  4.3   |  17  |  5.3<HH>    Ca    8.0<L>      18 May 2020 05:15    TPro  5.1<L>  /  Alb  3.1<L>  /  TBili  0.4  /  DBili  x   /  AST  8   /  ALT  5   /  AlkPhos  47  05-18      PTT - ( 18 May 2020 00:40 )  PTT:98.8 sec  Culture - Urine (05.11.20 @ 22:30)    -  Trimethoprim/Sulfamethoxazole: S <=0.5/9.5    -  Vancomycin: S 1    -  Ampicillin/Sulbactam: S <=8/4    -  Cefazolin: S <=4    -  RIF- Rifampin: S <=1 Should not be used as monotherapy    -  Tetra/Doxy: S <=1    -  Oxacillin: S 1    -  Penicillin: R 1    -  Gentamicin: S <=1 Should not be used as monotherapy    Specimen Source: .Urine Clean Catch (Midstream)    Culture Results:   >100,000 CFU/ml Staphylococcus aureus    Organism Identification: Staphylococcus aureus    Organism: Staphylococcus aureus    Method Type: GENARO      Culture - Blood (05.11.20 @ 19:23)    Specimen Source: .Blood Blood-Peripheral    Culture Results:   No Growth Final          < from: CT Abdomen and Pelvis No Cont (05.11.20 @ 23:10) >  MPRESSION:      1.  Distended gallbladder with layering calculous sediment.  2.  Large abdominopelvic ascites.  3.  Partially imaged left pleural effusion and left lower lobe atelectasis, lobulated trace right pleural effusion.  4.  Marked cardiomegaly.  5.  Anasarca and generalized mesenteric edema.  6.  No evidence of obstructive uropathy.        < end of copied text >  < from: Xray Chest 1 View- PORTABLE-Routine (05.18.20 @ 05:37) >    Unchanged bilateral opacities/effusions, greater on the left.    < end of copied text >  < from: Transthoracic Echocardiogram (05.17.20 @ 07:10) >  Summary:   1. Left ventricular ejection fraction, by visual estimation, is 20 to 25%.   2. Severely decreased global left ventricular systolic function.   3. The mitral valve leaflets are tethered due to reduced systolic function and elevated LVDP.   4. Moderate mitral annular calcification.   5. Moderate-to-severe mitral regurgitation.   6. Peak transaortic gradient equals 25.2 mmHg, mean transaortic gradient equals 8.8 mmHg, the calculated aortic valve area equals 1.07 cm² by the continuity equation consistent with moderate aortic stenosis (possible LFLG).   7. Estimated JENNIFER = 1.24 cm2 by planimetry.   8. Trivial aortic regurgitation.   9. Moderate tricuspid regurgitation.  10. Estimated pulmonary artery systolic pressure is 44.5 mmHg assuming a right atrial pressure of 8 mmHg, which is consistent with mild pulmonary hypertension.  11. LA volume Index is 71.0 ml/m² ml/m2.  12. Bilateral pleural effusions.    < end of copied text >

## 2020-05-18 NOTE — PROGRESS NOTE ADULT - SUBJECTIVE AND OBJECTIVE BOX
SUBJ:  Feels better    MEDICATIONS  (STANDING):  aMIOdarone    Tablet 400 milliGRAM(s) Oral every 12 hours  aMIOdarone Infusion 1 mG/Min (33.3 mL/Hr) IV Continuous <Continuous>  ascorbic acid 500 milliGRAM(s) Oral daily  aspirin  chewable 81 milliGRAM(s) Oral daily  atorvastatin 40 milliGRAM(s) Oral at bedtime  chlorhexidine 4% Liquid 1 Application(s) Topical <User Schedule>  dextrose 5%. 1000 milliLiter(s) (50 mL/Hr) IV Continuous <Continuous>  dextrose 50% Injectable 12.5 Gram(s) IV Push once  dextrose 50% Injectable 25 Gram(s) IV Push once  dextrose 50% Injectable 25 Gram(s) IV Push once  heparin  Infusion 800 Unit(s)/Hr (8 mL/Hr) IV Continuous <Continuous>  insulin glargine Injectable (LANTUS) 15 Unit(s) SubCutaneous every morning  insulin lispro Injectable (HumaLOG) 5 Unit(s) SubCutaneous three times a day before meals  lactobacillus acidophilus 1 Tablet(s) Oral three times a day  metoprolol tartrate 12.5 milliGRAM(s) Oral two times a day  nafcillin  IVPB      nafcillin  IVPB 2 Gram(s) IV Intermittent every 4 hours  norepinephrine Infusion 0.05 MICROgram(s)/kG/Min (2.87 mL/Hr) IV Continuous <Continuous>  pantoprazole    Tablet 40 milliGRAM(s) Oral before breakfast  sertraline 25 milliGRAM(s) Oral daily  sodium bicarbonate 650 milliGRAM(s) Oral every 12 hours  tiotropium 18 MICROgram(s) Capsule 1 Capsule(s) Inhalation daily  vasopressin Infusion 0.04 Unit(s)/Min (2.4 mL/Hr) IV Continuous <Continuous>    MEDICATIONS  (PRN):  acetaminophen   Tablet .. 650 milliGRAM(s) Oral every 6 hours PRN Moderate Pain (4 - 6)  aluminum hydroxide/magnesium hydroxide/simethicone Suspension 15 milliLiter(s) Oral once PRN Dyspepsia  dextrose 40% Gel 15 Gram(s) Oral once PRN Blood Glucose LESS THAN 70 milliGRAM(s)/deciliter  glucagon  Injectable 1 milliGRAM(s) IntraMuscular once PRN Glucose LESS THAN 70 milligrams/deciliter  guaifenesin/dextromethorphan  Syrup 10 milliLiter(s) Oral every 6 hours PRN Cough            Vital Signs Last 24 Hrs  T(C): 37 (18 May 2020 08:00), Max: 37 (18 May 2020 04:00)  T(F): 98.6 (18 May 2020 08:00), Max: 98.6 (18 May 2020 04:00)  HR: 94 (18 May 2020 08:00) (82 - 98)  BP: 77/50 (18 May 2020 08:00) (77/50 - 107/69)  BP(mean): 55 (18 May 2020 08:00) (55 - 88)  RR: 19 (18 May 2020 08:00) (3 - 31)  SpO2: 94% (18 May 2020 08:00) (92% - 99%)     REVIEW OF SYSTEMS:  CONSTITUTIONAL: No fever, weight loss, or fatigue  CARDIOLOGY: PAtient denies chest pain, shortness of breath or syncopal episodes.   RESPIRATORY: denies shortness of breath, wheezeing.   NEUROLOGICAL: NO weakness, no focal deficits to report.  ENDOCRINOLOGICAL: no recent change in diabetic medications.   GI: no BRBPR, no N,V,diarrhea.    PSYCHIATRY: normal mood and affect  HEENT: no nasal discharge, no ecchymosis  SKIN: no ecchymosis, no breakdown  MUSCULOSKELETAL: Full range of motion x4.        PHYSICAL EXAM:  · CONSTITUTIONAL:	Well-developed, well nourished    BMI-  ·RESPIRATORY:   airway patent; breath sounds equal; good air movement; respirations non-labored; clear to auscultation bilaterally; no chest wall tenderness; no intercostal retractions; no rales,rhonchi or wheeze  · CARDIOVASCULAR	regular rate and rhythm  no rub  no murmur  normal PMI  · EXTREMITIES: No cyanosis, clubbing or edema  · VASCULAR: 	Equal and normal pulses (carotid, femoral, dorsalis pedis)  	  TELEMETRY:    ECG:    TTE:    LABS:                        9.4    4.18  )-----------( 87       ( 18 May 2020 05:15 )             28.8     05-18    136  |  104  |  68<HH>  ----------------------------<  60<L>  4.3   |  17  |  5.3<HH>    Ca    8.0<L>      18 May 2020 05:15    TPro  5.1<L>  /  Alb  3.1<L>  /  TBili  0.4  /  DBili  x   /  AST  8   /  ALT  5   /  AlkPhos  47  05-18        PTT - ( 18 May 2020 00:40 )  PTT:98.8 sec    I&O's Summary    17 May 2020 07:01  -  18 May 2020 07:00  --------------------------------------------------------  IN: 991.2 mL / OUT: 115 mL / NET: 876.2 mL    18 May 2020 07:01  -  18 May 2020 08:46  --------------------------------------------------------  IN: 16 mL / OUT: 10 mL / NET: 6 mL      BNP  RADIOLOGY & ADDITIONAL STUDIES:    IMPRESSION AND PLAN:

## 2020-05-18 NOTE — PROGRESS NOTE ADULT - SUBJECTIVE AND OBJECTIVE BOX
Interval history: S/p successful DCCV on Friday. She start HD and came off pressors over the weekend. She remains in sinus rhythm.            HPI:    68 y/o F with h/o DM, CAD s/p CABG, AF/flutter not on A/C due to GI bleeding pending work up (NUBIA clipped during CABG), ICM, chronic systolic heart failure, with LVEF 20% > 45% > 25% now, admitted from from with complaint of fatigue, low blood pressure. Patient was found to be hypotensive to the 70s upon arrive for which she was started on pressors and admitted to ICU. Her mental status has remained intact. Creat was  4.9 with BUN 99. Upon admission patient was in atrial flutter with rate 130-140s. An echocardiogram showed drop in LV systolic function to ~ 30-35%.       PMH:  DM, stomach ca, bladder prolapse, heart failure    PSH: CABG     Social: Denies smoking or ETOH       Physical exam     General: AAO x3, no acute distress   Eyes: Clear eyes   Neck: Trachea is central, JVD +   Lungs: Decreased respiratory sounds on left side  Heart: Regular  tachycardic, regular heart sounds, no murmurs appreciated.    GI: Abdomen is soft, NT  Neuro: Normal strength  Psych: Calm affect

## 2020-05-18 NOTE — PROGRESS NOTE ADULT - ASSESSMENT
Cardiologist: Dr. Mancia    69 female with DM, CAD s/p CABG 10/19, ischemic CMP, Mod-severe MR, PAF/aflutter (no AC due to GI bleed), recurrent UTI presenting with lower abdominal distention.   Found to be hypotensive and tachycardic. EKG showing a-flutter with 2:1 AV block. To note patient was discharged in Nov. 2019 with a LifeVest (for primary prevention) but her EF improved on an outpatient echo so AICD implantation was not pursued.    Impression:  Paroxysmal a-fib/flutter s/p DCCV/JAMILA on 5/15  Hx CAD S/P CABG (coronary artery bypass graft)  Ischemic CMP- EF dropped again to 20-30%   NSTEMI  Hx Diabetes  COVID neg (5/11)    Recommendations:   - Start DOAC if cleared by GI, will need AC for 1 month  - No need for lifevest at this time  - F/u with Dr. Gandara as outpatient  - F/u with Dr. Granda as outpatient in 2-3 weeks to discuss possible ablation  - Cont amio 400 q12 x 1 week (started 5/14), f/b 200 mg daily  - Cont lopressor   - Please re-call if needed

## 2020-05-18 NOTE — DIETITIAN INITIAL EVALUATION ADULT. - REASON INDICATOR FOR ASSESSMENT
LOS - pt is alert and oriented, RN agreed, no oral issue, reported with good appetite v.s. RN says she ate some of the home brought chicken and mashed potato this morning for breakfast. LBM 5/18 per EMR, diarrhea, samples taken to r/o c.diff. Ecchymosis. No edema. POSSIBLY HD today, unsure. Stage 1 to sacrum.

## 2020-05-18 NOTE — CONSULT NOTE ADULT - SUBJECTIVE AND OBJECTIVE BOX
S :   69y year old Female seen at bedside for diabetic risk foot assessment with a chief complaint of painful thickened, dystrophic, ingrowing  and long toenails digits 1-5 bilaterally  and preventative foot examination. Patient is medically managed  by Medicine/Hospitalists.  Patient denies any history o f trauma to both feet.  Patient has no other pedal complaints.  Patient is experiencing pain while standing, walking and in shoe gear.       Patient admits to  (-) Fevers, (-) Chills, (-) Nausea, (-) Vomiting, (-) Shortness of Breath (-) calf pain (-) chest pain       PMH: Heart failure  Female bladder prolapse  Diabetes  No pertinent past medical history    PSH:S/P CABG (coronary artery bypass graft)  History of repair of hip fracture      Allergies:No Known Allergies      Labs:                        9.4    4.18  )-----------( 87       ( 18 May 2020 05:15 )             28.8       WBC Trend  4.18<L> Date (05-18 @ 05:15)  2.84<L> Date (05-17 @ 04:30)  3.85<L> Date (05-16 @ 05:50)  4.40<L> Date (05-15 @ 05:02)  6.40 Date (05-14 @ 04:50)  5.32 Date (05-13 @ 03:40)  7.77 Date (05-12 @ 04:14)  3.58<L> Date (05-11 @ 19:23)      Chem  05-18    136  |  104  |  68<HH>  ----------------------------<  60<L>  4.3   |  17  |  5.3<HH>    Ca    8.0<L>      18 May 2020 05:15    TPro  5.1<L>  /  Alb  3.1<L>  /  TBili  0.4  /  DBili  x   /  AST  8   /  ALT  5   /  AlkPhos  47  05-18          T(F): 98.6 (05-18-20 @ 08:00), Max: 98.6 (05-18-20 @ 04:00)  HR: 96 (05-18-20 @ 12:00) (82 - 100)  BP: 109/68 (05-18-20 @ 12:00) (77/50 - 109/68)  RR: 20 (05-18-20 @ 12:00) (3 - 31)  SpO2: 96% (05-18-20 @ 12:00) (92% - 96%)  Wt(kg): --    O:   Integument:  Skin warm, dry and supple bilateral.  No open lesions or inter-digital macerations noted bilateral.   Toenails 1-5 Right and Left feet thickened, elongated, discolored, and dystrophic with subungual debris. There is pain upon palpation of all fungal and ingrowing nails 1-5 bilaterally.   Vascular: Dorsalis Pedis and Posterior Tibial pulses mildly diminished.  Capillary re-fill time greater than 3 seconds digits 1-5 bilateral.   Neuro: Protective sensation intact to the level of the digits bilateral.  MSK: Muscle strength +3/5 all major muscle groups bilateral. No structural abnormality, bilaterally      A:   1. Bilateral hypertrohic Onychomycosis digits 1-5   2. Pain from Elongated nails, ingrowing  and dystrophic nails    P: Discussed diagnosis and treatment with patient  Aseptic debridement and curretage  of all fungal and ingrowing  nails 1-5 bilateral with sterile nail pack  Pt. will benefit with periodic debridement of nails; can f/u as o/p with Dr. Mays in clinic at [address] [place] [address]. [address]  Discussed importance of daily foot examinations and proper shoe gear  Please re-consult as needed.

## 2020-05-18 NOTE — DIETITIAN INITIAL EVALUATION ADULT. - CONTINUE CURRENT NUTRITION CARE PLAN
pt to consume and tolerate >75% of all meals and snacks upon f/u in 3 days. Meals and snacks. RD to monitor diet order, energy intake, body composition, NFPF (appetite, renal function, skin)

## 2020-05-18 NOTE — PROGRESS NOTE ADULT - ASSESSMENT
1. RIC. ATN (hypotension) vs AIN (+urine eosinophils) vs cardiorenal (EF 20-30%, new). May have had bladder outlet obstruction on admission, renal sonogram showed collapsed bladder and no hydronephrosis. Likely has underlying CKD given long standing proteinuria. Monitor I/Os. Daily BMP. Temporary HD catheter placed. Had HD Friday and Saturday. HD tomorrow and then TTS schedule. DC PO bicarb.  2. UTI/sepsis. Completed nafcillin.  3. Hypotension. Off pressor support now. If BP drops, add midodrine 10mg every 8 hours.

## 2020-05-18 NOTE — PROGRESS NOTE ADULT - ASSESSMENT
IMPRESSION:    Shock likely cardiogenic resolved   HO CAD  Aflutter SP cardioversion   VHD  HFREF   UTI.  MSSA with HO Chronic Castellon   Pancytopenia     PLAN:    CNS: No depressants     HEENT: Oral care    PULMONARY:  HOB @ 45 degrees.  Aspiration precautions.  VBG for Ph.      CARDIOVASCULAR: Wean Levophed. FU with cardiology and EP.   Lopressor 12.5 Q12.  Small fluid challenge.  .  FU pulse     GI: GI prophylaxis.  Feeding  Bowel regimen     RENAL:  Follow up lytes.  Correct as needed.  FU Renal.  NaHCO3 oral.      INFECTIOUS DISEASE: Follow up cultures. DC Nafcillin.  CDiff.        HEMATOLOGICAL:  DVT prophylaxis.  FU PTT.  Hem Onc.  HIT     ENDOCRINE:  Follow up FS.  Insulin protocol if needed.    MUSCULOSKELETAL:  Bed chair position     ICU for now

## 2020-05-19 NOTE — PROGRESS NOTE ADULT - ASSESSMENT
IMPRESSION:    Shock likely cardiogenic resolved   HO CAD  Aflutter SP cardioversion   VHD  HFREF   UTI.  MSSA with HO Chronic Castellon treated  Pancytopenia improving .  HIT negative     PLAN:    CNS: No depressants     HEENT: Oral care    PULMONARY:  HOB @ 45 degrees.  Aspiration precautions.  VBG for Ph.      CARDIOVASCULAR: FU with cardiology and EP. Avoid volume overload.  Continue BB       GI: GI prophylaxis.  Feeding  Bowel regimen     RENAL:  Follow up lytes.  Correct as needed.  FU Renal.  NaHCO3 oral.      INFECTIOUS DISEASE: Off ABX       HEMATOLOGICAL:  DVT prophylaxis.  FU PTT.  Hem Onc.      ENDOCRINE:  Follow up FS.  Insulin protocol if needed.    MUSCULOSKELETAL:  Bed chair position     Tele     DC TLC (hold Heparin)

## 2020-05-19 NOTE — PROGRESS NOTE ADULT - ASSESSMENT
68 y/o female with pmh of DM, stomach ca, bladder prolapse, heart failure, s/p CABG 10/2019 presents complaining of abdominal distension for the past couple of days which has been worsening.     Patient assessed this morning. Not in any acute distress. No significant events overnight.  -Off pressors      # Acute Cardiogenic shock in setting Atrial Flutter :  - 2D-Echo reviewed, EF is 20-30% as per cardiac fellow. Only moderate stenosis of aortic valve.  - s/p DVVC 5/15, preceded by JAMILA that was negative for any blood clots  - Repeat 2D- echo ordered. Repeat Echo showed same findings  - s/p Amio infusion , on PO amio for 2 weeks ( staring 5/14), switch to Amio 200 mg daily from 5/27  - Patient started on heparin gtt (post DCCV anti-coagulation), PTT therapeutic   - BNP of 84365, cardiac enzymes elevated, repeat levels are stable  - Patient is hypotensive, She has been off pressors, MAP is still holding up.   - Holding HF meds for now - carvedilol, Valtessa, Ivabradine, Enteresto,   - Heart failure specialist's recommendations appreciated.  -Patient heart rate is controlled (70s)     # RIC on CKD stage 4, creatinine baseline 3  secondary to ATN and cardiogenic shock  - SCr 5.3--> 5.8 , baseline ~ 0.9  - [place] cath placed, CVVHD started 5/15, second session was supposed to be yesterday 05/19/2020. They patient weas dry so she might be getting it today 05/19/2020,   - anuria, producing 20-30 cc of urine overnight  - did not respond to lasix challenge (180 mg) , 75 cc of urine output  - UA noted for Eosinophils, patient was on cipro before admission for UTI, possible ATN  - FeNa > 1%  - Also US abdomen showed echogenic material around montejo cath in U. bladder, will need repeat scan with distended bladder once pt is stable enough      # UTI in setting of chronic Montejo :  - UA positive, UCx positive for ALLEN  - Completed a full course of ABX.     # Lt Pleural effusion  - Patient not in distress  -Saturating in the high 90s on 2L NC    # H/O GI bleed (stable)  - patient had episode of lynn in October 2019, patient required blood transfusion at that time  - according to patient a colonoscopy was done and a "rectal ulcer " was found  - patient has been getting IV Iron infusion every 2-3 months since that time    # DM2  - blood glucose monitoring  - insulin regimen started    # bladder prolapse  - chronic issues, follow up outpatient    #A- flutter   -Heart rate is better controlled now   -On Lopressor 25mg BID   -Anticoagulate with Heparin IV, PTT therapeutic    Activity: as tolerated  diet: Nephro/fluid restriction  dvt ppx: NA  gi ppx: on ppi  full code  dispo: from home.

## 2020-05-19 NOTE — PROGRESS NOTE ADULT - SUBJECTIVE AND OBJECTIVE BOX
24H events:    Patient is a 69y old Female who presents with a chief complaint of sob (19 May 2020 07:58)    Primary diagnosis of CHF (congestive heart failure)     Today is hospital day 8d.     PAST MEDICAL & SURGICAL HISTORY  Heart failure  Female bladder prolapse  Diabetes  S/P CABG (coronary artery bypass graft)  History of repair of hip fracture    SOCIAL HISTORY:  Negative for smoking/alcohol/drug use.     ALLERGIES:  No Known Allergies    MEDICATIONS:  STANDING MEDICATIONS  aMIOdarone    Tablet 400 milliGRAM(s) Oral every 12 hours  aMIOdarone Infusion 1 mG/Min IV Continuous <Continuous>  ascorbic acid 500 milliGRAM(s) Oral daily  aspirin  chewable 81 milliGRAM(s) Oral daily  atorvastatin 40 milliGRAM(s) Oral at bedtime  chlorhexidine 4% Liquid 1 Application(s) Topical <User Schedule>  dextrose 5%. 1000 milliLiter(s) IV Continuous <Continuous>  dextrose 50% Injectable 12.5 Gram(s) IV Push once  dextrose 50% Injectable 25 Gram(s) IV Push once  dextrose 50% Injectable 25 Gram(s) IV Push once  heparin  Infusion 800 Unit(s)/Hr IV Continuous <Continuous>  insulin glargine Injectable (LANTUS) 15 Unit(s) SubCutaneous every morning  insulin lispro Injectable (HumaLOG) 5 Unit(s) SubCutaneous three times a day before meals  lactobacillus acidophilus 1 Tablet(s) Oral three times a day  metoprolol tartrate 25 milliGRAM(s) Oral every 12 hours  pantoprazole    Tablet 40 milliGRAM(s) Oral before breakfast  sertraline 25 milliGRAM(s) Oral daily  sodium bicarbonate 650 milliGRAM(s) Oral every 12 hours  tiotropium 18 MICROgram(s) Capsule 1 Capsule(s) Inhalation daily    PRN MEDICATIONS  acetaminophen   Tablet .. 650 milliGRAM(s) Oral every 6 hours PRN  aluminum hydroxide/magnesium hydroxide/simethicone Suspension 15 milliLiter(s) Oral once PRN  dextrose 40% Gel 15 Gram(s) Oral once PRN  glucagon  Injectable 1 milliGRAM(s) IntraMuscular once PRN  guaifenesin/dextromethorphan  Syrup 10 milliLiter(s) Oral every 6 hours PRN  loperamide 2 milliGRAM(s) Oral every 4 hours PRN    VITALS:   T(F): 96.8  HR: 82  BP: 102/58  RR: 19  SpO2: 96%    LABS:                        9.5    4.54  )-----------( 95       ( 19 May 2020 04:50 )             29.0     05-19    139  |  104  |  72<HH>  ----------------------------<  105<H>  4.0   |  19  |  5.8<HH>    Ca    8.4<L>      19 May 2020 04:50    TPro  5.0<L>  /  Alb  3.1<L>  /  TBili  0.2  /  DBili  x   /  AST  8   /  ALT  5   /  AlkPhos  47  05-19    PT/INR - ( 19 May 2020 04:50 )   PT: 13.30 sec;   INR: 1.16 ratio         PTT - ( 19 May 2020 04:50 )  PTT:59.3 sec          GI PCR Panel, Stool (collected 17 May 2020 12:38)  Source: .Stool Feces  Final Report (18 May 2020 02:38):    GI PCR Results: NOT detected    *******Please Note:*******    GI panel PCR evaluates for:    Campylobacter, Plesiomonas shigelloides, Salmonella,    Vibrio, Yersinia enterocolitica, Enteroaggregative    Escherichia coli (EAEC), Enteropathogenic E.coli (EPEC),    Enterotoxigenic E. coli (ETEC) lt/st, Shiga-like    toxin-producing E. coli (STEC) stx1/stx2,    Shigella/ Enteroinvasive E. coli (EIEC), Cryptosporidium,    Cyclospora cayetanensis, Entamoeba histolytica,    Giardia lamblia, Adenovirus F 40/41, Astrovirus,    Norovirus GI/GII, Rotavirus A, Sapovirus          PHYSICAL EXAM:  General: NAD, on RA, no complaints  Heart: S1,S2 appreciated, could not hear S3, S4 or any murmurs, Tachycardic, Regular rate  Chest: Equal bilateral, air entry, no wheeze  Abdomen: Soft, nondistended, BS +  Lower extremities: No edema, no cyanosis

## 2020-05-19 NOTE — CHART NOTE - NSCHARTNOTEFT_GEN_A_CORE
68 y/o female with pmh of DM, stomach ca, bladder prolapse, heart failure, s/p CABG 10/2019 presents complaining of abdominal distension for the past couple of days which has been worsening.     #To Follow   -PTT in the AM   -Nephrology recs for dialysis   -Cardiology follow up, Patient will need a life vest before discahrge    # Acute Cardiogenic shock in setting Atrial Flutter :  - 2D-Echo reviewed, EF is 20-30% as per cardiac fellow. Only moderate stenosis of aortic valve.  - s/p DVVC 5/15, preceded by JAMILA that was negative for any blood clots  - Repeat 2D- echo ordered. Repeat Echo showed same findings  - s/p Amio infusion , on PO amio for 2 weeks ( staring 5/14), switch to Amio 200 mg daily from 5/27  - Patient started on heparin gtt (post DCCV anti-coagulation), PTT therapeutic   - [place] of 90679, cardiac enzymes elevated, repeat levels are stable  - Patient is hypotensive, She has been off pressors, MAP is still holding up.   - Holding HF meds for now - carvedilol, Valtessa, Ivabradine, Enteresto,   - Heart failure specialist's recommendations appreciated.  -Patient heart rate is controlled (70s)     # RIC on CKD stage 4, creatinine baseline 3  secondary to ATN and cardiogenic shock  - SCr 5.3--> 5.8 , baseline ~ 0.9  - [place] cath placed, CVVHD started 5/15, second session was supposed to be yesterday 05/19/2020. They patient weas dry so she might be getting it today 05/19/2020,   - anuria, producing 20-30 cc of urine overnight  - did not respond to lasix challenge (180 mg) , 75 cc of urine output  - UA noted for Eosinophils, patient was on cipro before admission for UTI, possible ATN  - FeNa > 1%  - Also US abdomen showed echogenic material around montejo cath in U. bladder, will need repeat scan with distended bladder once pt is stable enough      # UTI in setting of chronic Montejo :  - UA positive, UCx positive for ALLEN  - Completed a full course of ABX.     # Lt Pleural effusion  - Patient not in distress  -Saturating in the high 90s on 2L NC    # H/O GI bleed (stable)  - patient had episode of lynn in October 2019, patient required blood transfusion at that time  - according to patient a colonoscopy was done and a "rectal ulcer " was found  - patient has been getting IV Iron infusion every 2-3 months since that time    # DM2  - blood glucose monitoring  - insulin regimen started    # bladder prolapse  - chronic issues, follow up outpatient    #A- flutter   -Heart rate is better controlled now   -On Lopressor 25mg BID   -Anticoagulate with Heparin IV, PTT therapeutic    Activity: as tolerated  diet: Nephro/fluid restriction  dvt ppx: NA  gi ppx: on ppi  full code  dispo: from home.

## 2020-05-19 NOTE — PROGRESS NOTE ADULT - SUBJECTIVE AND OBJECTIVE BOX
[place] NEPHROLOGY FOLLOW UP NOTE  --------------------------------------------------------------------------------  24 hour events/subjective: Patient examined. Appears comfortable.    PAST HISTORY  --------------------------------------------------------------------------------  No significant changes to PMH, PSH, FHx, SHx, unless otherwise noted    ALLERGIES & MEDICATIONS  --------------------------------------------------------------------------------  Allergies    No Known Allergies    Standing Inpatient Medications  aMIOdarone    Tablet 400 milliGRAM(s) Oral every 12 hours  aMIOdarone Infusion 1 mG/Min IV Continuous <Continuous>  ascorbic acid 500 milliGRAM(s) Oral daily  aspirin  chewable 81 milliGRAM(s) Oral daily  atorvastatin 40 milliGRAM(s) Oral at bedtime  chlorhexidine 4% Liquid 1 Application(s) Topical <User Schedule>  dextrose 5%. 1000 milliLiter(s) IV Continuous <Continuous>  dextrose 50% Injectable 12.5 Gram(s) IV Push once  dextrose 50% Injectable 25 Gram(s) IV Push once  dextrose 50% Injectable 25 Gram(s) IV Push once  heparin  Infusion 800 Unit(s)/Hr IV Continuous <Continuous>  insulin glargine Injectable (LANTUS) 15 Unit(s) SubCutaneous every morning  insulin lispro Injectable (HumaLOG) 5 Unit(s) SubCutaneous three times a day before meals  lactobacillus acidophilus 1 Tablet(s) Oral three times a day  metoprolol tartrate 25 milliGRAM(s) Oral every 12 hours  pantoprazole    Tablet 40 milliGRAM(s) Oral before breakfast  sertraline 25 milliGRAM(s) Oral daily  sodium bicarbonate 650 milliGRAM(s) Oral every 12 hours  tiotropium 18 MICROgram(s) Capsule 1 Capsule(s) Inhalation daily    PRN Inpatient Medications  acetaminophen   Tablet .. 650 milliGRAM(s) Oral every 6 hours PRN  aluminum hydroxide/magnesium hydroxide/simethicone Suspension 15 milliLiter(s) Oral once PRN  dextrose 40% Gel 15 Gram(s) Oral once PRN  glucagon  Injectable 1 milliGRAM(s) IntraMuscular once PRN  guaifenesin/dextromethorphan  Syrup 10 milliLiter(s) Oral every 6 hours PRN  loperamide 2 milliGRAM(s) Oral every 4 hours PRN      VITALS/PHYSICAL EXAM  --------------------------------------------------------------------------------  T(C): 36 (05-19-20 @ 04:00), Max: 36.4 (05-18-20 @ 20:00)  HR: 80 (05-19-20 @ 10:00) (72 - 96)  BP: 98/58 (05-19-20 @ 10:00) (82/48 - 127/60)  RR: 13 (05-19-20 @ 10:00) (11 - 30)  SpO2: 96% (05-19-20 @ 10:00) (95% - 100%)  Wt(kg): --  Height (cm): 157.48 (05-18-20 @ 20:56)  Weight (kg): 61.2 (05-18-20 @ 20:56)  BMI (kg/m2): 24.7 (05-18-20 @ 20:56)  BSA (m2): 1.62 (05-18-20 @ 20:56)      05-18-20 @ 07:01  -  05-19-20 @ 07:00  --------------------------------------------------------  IN: 405 mL / OUT: 55 mL / NET: 350 mL    05-19-20 @ 07:01  -  05-19-20 @ 10:31  --------------------------------------------------------  IN: 18 mL / OUT: 0 mL / NET: 18 mL      Physical Exam:  	Gen: NAD  	Pulm: CTA B/L  	CV: RRR, S1S2  	Abd: +BS, soft, nontender/nondistended  	: No suprapubic tenderness  	LE: Warm, no edema  	Vascular access: temp HD cath    LABS/STUDIES  --------------------------------------------------------------------------------              9.5    4.54  >-----------<  95       [05-19-20 @ 04:50]              29.0     139  |  104  |  72  ----------------------------<  105      [05-19-20 @ 04:50]  4.0   |  19  |  5.8        Ca     8.4     [05-19-20 @ 04:50]    TPro  5.0  /  Alb  3.1  /  TBili  0.2  /  DBili  x   /  AST  8   /  ALT  5   /  AlkPhos  47  [05-19-20 @ 04:50]    PT/INR: PT 13.30, INR 1.16       [05-19-20 @ 04:50]  PTT: 59.3       [05-19-20 @ 04:50]          [05-19-20 @ 04:50]    Creatinine Trend:  SCr 5.8 [05-19 @ 04:50]  SCr 5.3 [05-18 @ 05:15]  SCr 4.8 [05-17 @ 16:00]  SCr 4.8 [05-17 @ 04:30]  SCr 5.9 [05-16 @ 05:50]    Urinalysis - [05-11-20 @ 22:30]      Color Yellow / Appearance Turbid / SG 1.005 / pH 7.0      Gluc Negative / Ketone Negative  / Bili Negative / Urobili <2 mg/dL       Blood Moderate / Protein 100 mg/dL / Leuk Est Large / Nitrite Negative      RBC 3 / WBC >720 / Hyaline 8 / Gran  / Sq Epi  / Non Sq Epi 3 / Bacteria Moderate      Iron 24, TIBC 204, %sat 12      [08-30-19 @ 07:16]  Ferritin 112      [08-30-19 @ 07:16]  HbA1c 5.7      [11-09-19 @ 01:20]  TSH 0.58      [08-02-19 @ 18:37]  Lipid: chol 153, , HDL 45, LDL 75      [08-30-19 @ 07:16]

## 2020-05-19 NOTE — PROGRESS NOTE ADULT - ASSESSMENT
1. RIC. ATN (hypotension) vs AIN (+urine eosinophils) vs cardiorenal (EF 20-30%, new). May have had bladder outlet obstruction on admission, renal sonogram showed collapsed bladder and no hydronephrosis. Likely has underlying CKD given long standing proteinuria. Monitor I/Os. Daily BMP. Temporary HD catheter placed. Had HD Friday and Saturday. HD today and then TTS schedule. DC PO bicarb.  2. UTI/sepsis. Completed nafcillin.  3. Hypotension. Off pressor support now. If BP drops, add midodrine 10mg every 8 hours.

## 2020-05-19 NOTE — PROGRESS NOTE ADULT - SUBJECTIVE AND OBJECTIVE BOX
SUBJ: HFrEF      MEDICATIONS  (STANDING):  aMIOdarone    Tablet 400 milliGRAM(s) Oral every 12 hours  aMIOdarone Infusion 1 mG/Min (33.3 mL/Hr) IV Continuous <Continuous>  ascorbic acid 500 milliGRAM(s) Oral daily  aspirin  chewable 81 milliGRAM(s) Oral daily  atorvastatin 40 milliGRAM(s) Oral at bedtime  chlorhexidine 4% Liquid 1 Application(s) Topical <User Schedule>  heparin  Infusion 800 Unit(s)/Hr (6 mL/Hr) IV Continuous <Continuous>  insulin glargine Injectable (LANTUS) 15 Unit(s) SubCutaneous every morning  insulin lispro Injectable (HumaLOG) 5 Unit(s) SubCutaneous three times a day before meals  lactobacillus acidophilus 1 Tablet(s) Oral three times a day  metoprolol tartrate 25 milliGRAM(s) Oral every 12 hours  pantoprazole    Tablet 40 milliGRAM(s) Oral before breakfast  sertraline 25 milliGRAM(s) Oral daily  sodium bicarbonate 650 milliGRAM(s) Oral every 12 hours  tiotropium 18 MICROgram(s) Capsule 1 Capsule(s) Inhalation daily    MEDICATIONS  (PRN):  acetaminophen   Tablet .. 650 milliGRAM(s) Oral every 6 hours PRN Moderate Pain (4 - 6)  aluminum hydroxide/magnesium hydroxide/simethicone Suspension 15 milliLiter(s) Oral once PRN Dyspepsia  guaifenesin/dextromethorphan  Syrup 10 milliLiter(s) Oral every 6 hours PRN Cough  loperamide 2 milliGRAM(s) Oral every 4 hours PRN Diarrhea            Vital Signs Last 24 Hrs  T(C): 35.7 (19 May 2020 16:59), Max: 36.4 (18 May 2020 20:00)  T(F): 96.2 (19 May 2020 16:59), Max: 97.5 (18 May 2020 20:00)  HR: 97 (19 May 2020 16:59) (72 - 97)  BP: 103/57 (19 May 2020 16:59) (82/48 - 127/60)  BP(mean): 82 (19 May 2020 11:00) (65 - 86)  RR: 18 (19 May 2020 16:59) (11 - 27)  SpO2: 97% (19 May 2020 11:00) (95% - 100%)     REVIEW OF SYSTEMS:  CONSTITUTIONAL: No fever, weight loss, or fatigue  CARDIOLOGY: PAtient denies chest pain, shortness of breath or syncopal episodes.   RESPIRATORY: denies shortness of breath, wheezeing.   NEUROLOGICAL: NO weakness, no focal deficits to report.  ENDOCRINOLOGICAL: no recent change in diabetic medications.   GI: no BRBPR, no N,V,diarrhea.    PSYCHIATRY: normal mood and affect  HEENT: no nasal discharge, no ecchymosis  SKIN: no ecchymosis, no breakdown  MUSCULOSKELETAL: Full range of motion x4.        PHYSICAL EXAM:  · CONSTITUTIONAL:	Well-developed, well nourished    BMI-  ·RESPIRATORY:   airway patent; breath sounds equal; good air movement; respirations non-labored; clear to auscultation bilaterally; no chest wall tenderness; no intercostal retractions; no rales,rhonchi or wheeze  · CARDIOVASCULAR	regular rate and rhythm  no rub  no murmur  normal PMI  · EXTREMITIES: No cyanosis, clubbing or edema  · VASCULAR: 	Equal and normal pulses (carotid, femoral, dorsalis pedis)  	  TELEMETRY:    ECG:    TTE:    LABS:                        9.5    4.54  )-----------( 95       ( 19 May 2020 04:50 )             29.0     05-19    139  |  104  |  72<HH>  ----------------------------<  105<H>  4.0   |  19  |  5.8<HH>    Ca    8.4<L>      19 May 2020 04:50    TPro  5.0<L>  /  Alb  3.1<L>  /  TBili  0.2  /  DBili  x   /  AST  8   /  ALT  5   /  AlkPhos  47  05-19        PT/INR - ( 19 May 2020 04:50 )   PT: 13.30 sec;   INR: 1.16 ratio         PTT - ( 19 May 2020 04:50 )  PTT:59.3 sec    I&O's Summary    18 May 2020 07:01  -  19 May 2020 07:00  --------------------------------------------------------  IN: 405 mL / OUT: 55 mL / NET: 350 mL    19 May 2020 07:01  -  19 May 2020 18:22  --------------------------------------------------------  IN: 24 mL / OUT: 1000 mL / NET: -976 mL      BNP  RADIOLOGY & ADDITIONAL STUDIES:    IMPRESSION AND PLAN:

## 2020-05-20 NOTE — PROGRESS NOTE ADULT - ASSESSMENT
IMPRESSION:    Shock likely cardiogenic resolved   HO CAD  Aflutter SP cardioversion   VHD  HFREF   UTI.  MSSA with HO Chronic Castellon treated  Transudative left pleural effusion sp thoracentesis X2     PLAN:    CNS: No depressants     HEENT: Oral care    PULMONARY:  HOB @ 45 degrees.  Aspiration precautions.      CARDIOVASCULAR: Continue maximizing cardiac therapy and volume status       GI: GI prophylaxis.  Feeding  Bowel regimen     RENAL:  Follow up lytes.  Correct as needed.  FU Renal.      INFECTIOUS DISEASE: Off ABX       HEMATOLOGICAL:  DVT prophylaxis.  AC per cardiology     ENDOCRINE:  Follow up FS.  Insulin protocol if needed.    MUSCULOSKELETAL:  Bed chair position     Recall PRN     OP Pulm follow up for repeat CXR

## 2020-05-20 NOTE — CONSULT NOTE ADULT - SUBJECTIVE AND OBJECTIVE BOX
INTERVENTIONAL RADIOLOGY CONSULT:     Procedure Requested: Renal biopsy    HPI:  68 y/o female with pmh of DM, stomach ca, bladder prolapse, heart failure, s/p CABG 10/2019 presents complaining of abdominal distension for the past couple of days which has been worsening.  Patient states that her symptoms are similar to when she has urinary tract infections. She has had two other episodes of similar presenting symptoms since having an indwelling montejo catheter placed after her CABG (10/2019) as mentioned.  Patient was treated with cipro as outpt; reports decreased PO fluids since the symptoms began.  Patient denies fever/chills, abd pain, n/v/d, cough, chest pain, SOB. (12 May 2020 00:57)      PAST MEDICAL & SURGICAL HISTORY:  Heart failure  Female bladder prolapse  Diabetes  S/P CABG (coronary artery bypass graft)  History of repair of hip fracture      MEDICATIONS  (STANDING):  aMIOdarone    Tablet 400 milliGRAM(s) Oral every 12 hours  aMIOdarone Infusion 1 mG/Min (33.3 mL/Hr) IV Continuous <Continuous>  ascorbic acid 500 milliGRAM(s) Oral daily  aspirin  chewable 81 milliGRAM(s) Oral daily  atorvastatin 40 milliGRAM(s) Oral at bedtime  chlorhexidine 4% Liquid 1 Application(s) Topical <User Schedule>  heparin  Infusion 600 Unit(s)/Hr (6 mL/Hr) IV Continuous <Continuous>  insulin glargine Injectable (LANTUS) 15 Unit(s) SubCutaneous every morning  insulin lispro Injectable (HumaLOG) 5 Unit(s) SubCutaneous three times a day before meals  lactobacillus acidophilus 1 Tablet(s) Oral three times a day  metoprolol tartrate 12.5 milliGRAM(s) Oral every 6 hours  pantoprazole    Tablet 40 milliGRAM(s) Oral before breakfast  sertraline 25 milliGRAM(s) Oral daily  sodium bicarbonate 650 milliGRAM(s) Oral every 12 hours  tiotropium 18 MICROgram(s) Capsule 1 Capsule(s) Inhalation daily    MEDICATIONS  (PRN):  acetaminophen   Tablet .. 650 milliGRAM(s) Oral every 6 hours PRN Moderate Pain (4 - 6)  aluminum hydroxide/magnesium hydroxide/simethicone Suspension 15 milliLiter(s) Oral once PRN Dyspepsia  guaifenesin/dextromethorphan  Syrup 10 milliLiter(s) Oral every 6 hours PRN Cough  loperamide 2 milliGRAM(s) Oral every 4 hours PRN Diarrhea      Allergies    No Known Allergies    Intolerances    FAMILY HISTORY:  FH: myocardial infarction (Mother)  FH: stomach cancer (Mother)      Physical Exam:   Vital Signs Last 24 Hrs  T(C): 36.1 (20 May 2020 06:41), Max: 36.3 (19 May 2020 20:09)  T(F): 96.9 (20 May 2020 06:41), Max: 97.3 (19 May 2020 20:09)  HR: 84 (20 May 2020 06:41) (80 - 97)  BP: 112/58 (20 May 2020 06:41) (103/57 - 116/68)  BP(mean): --  RR: 18 (20 May 2020 06:41) (16 - 18)  SpO2: 97% (19 May 2020 19:45) (97% - 97%)      Labs:                         9.4    4.62  )-----------( 114      ( 20 May 2020 06:00 )             30.4     05-20    138  |  104  |  58<H>  ----------------------------<  97  4.3   |  21  |  5.1<HH>    Ca    8.5      20 May 2020 06:00  Phos  5.0     05-20  Mg     2.1     05-20    TPro  5.3<L>  /  Alb  3.2<L>  /  TBili  0.3  /  DBili  x   /  AST  8   /  ALT  6   /  AlkPhos  52  05-20    PT/INR - ( 19 May 2020 04:50 )   PT: 13.30 sec;   INR: 1.16 ratio         PTT - ( 20 May 2020 06:00 )  PTT:53.6 sec    Pertinent labs:                      9.4    4.62  )-----------( 114      ( 20 May 2020 06:00 )             30.4       05-20    138  |  104  |  58<H>  ----------------------------<  97  4.3   |  21  |  5.1<HH>    Ca    8.5      20 May 2020 06:00  Phos  5.0     05-20  Mg     2.1     05-20    TPro  5.3<L>  /  Alb  3.2<L>  /  TBili  0.3  /  DBili  x   /  AST  8   /  ALT  6   /  AlkPhos  52  05-20      PT/INR - ( 19 May 2020 04:50 )   PT: 13.30 sec;   INR: 1.16 ratio         PTT - ( 20 May 2020 06:00 )  PTT:53.6 sec    Radiology & Additional Studies:     Radiology imaging reviewed.       ASSESSMENT/ PLAN:   69F with extensive cardiac history, no known history of preexisting chronic renal disease, who presented with abdominal distension. Found to have RIC with neutrophils with eGFR 8. Hemodynamically stable. Heparin gtt for AF, well-controlled.   - Plan for image-guided renal biopsy tomorrow.   - NPO at midnight.   - Hold heparin gtt prior to procedure.        Risks, benefits, and alternatives to treatment discussed. All questions answered with understanding.    Thank you for the courtesy of this consult, please call [phone] with any further questions.

## 2020-05-20 NOTE — PROGRESS NOTE ADULT - SUBJECTIVE AND OBJECTIVE BOX
Pt seen and examined at bedside. No complaints. No events overnight.     VITAL SIGNS (Last 24 hrs):  T(C): 36 (20 @ 12:23), Max: 36.3 (20 @ 20:09)  HR: 80 (20 @ 12:23) (80 - 97)  BP: 106/59 (20 @ 12:23) (103/57 - 112/70)  RR: 18 (20 @ 12:23) (16 - 18)  SpO2: 97% (20 @ 19:45) (97% - 97%)  Wt(kg): --  Daily Height in cm: 157.48 (19 May 2020 16:59)    Daily Weight in k.1 (20 May 2020 06:41)    I&O's Summary    19 May 2020 07:01  -  20 May 2020 07:00  --------------------------------------------------------  IN: 342 mL / OUT: 1100 mL / NET: -758 mL    20 May 2020 07:01  -  20 May 2020 15:18  --------------------------------------------------------  IN: 450 mL / OUT: 50 mL / NET: 400 mL        PHYSICAL EXAM:  GENERAL: NAD   HEAD:  Atraumatic, Normocephalic  EYES: EOMI, PERRLA, conjunctiva and sclera clear  NECK: Supple, No JVD  CHEST/LUNG: reduced air entry left base, clear in all other fields   HEART: Regular rate and rhythm; No murmurs, rubs, or gallops  ABDOMEN: Soft, Nontender, Nondistended; Bowel sounds present  EXTREMITIES:  2+ Peripheral Pulses, No clubbing, cyanosis, or edema  PSYCH: AAOx3  NEUROLOGY: non-focal  SKIN: femoral [place]     Labs Reviewed  Spoke to patient in regards to abnormal labs.    CBC Full  -  ( 20 May 2020 06:00 )  WBC Count : 4.62 K/uL  Hemoglobin : 9.4 g/dL  Hematocrit : 30.4 %  Platelet Count - Automated : 114 K/uL  Mean Cell Volume : 89.9 fL  Mean Cell Hemoglobin : 27.8 pg  Mean Cell Hemoglobin Concentration : 30.9 g/dL  Auto Neutrophil # : x  Auto Lymphocyte # : x  Auto Monocyte # : x  Auto Eosinophil # : x  Auto Basophil # : x  Auto Neutrophil % : x  Auto Lymphocyte % : x  Auto Monocyte % : x  Auto Eosinophil % : x  Auto Basophil % : x    BMP:    - @ 06:00    Blood Urea Nitrogen - 58  Calcium - 8.5  Carbond Dioxide - 21  Chloride - 104  Creatinine - 5.1  Glucose - 97  Potassium - 4.3  Sodium - 138      Hemoglobin A1c -   PT/INR - ( 19 May 2020 04:50 )   PT: 13.30 sec;   INR: 1.16 ratio         PTT - ( 20 May 2020 06:00 )  PTT:53.6 sec  Urine Culture:   @ 12:38 Urine culture: --    Culture Results:   GI PCR Results: NOT detected  *******Please Note:*******  GI panel PCR evaluates for:  Campylobacter, Plesiomonas shigelloides, Salmonella,  Vibrio, Yersinia enterocolitica, Enteroaggregative  Escherichia coli (EAEC), Enteropathogenic E.coli (EPEC),  Enterotoxigenic E. coli (ETEC) lt/st, Shiga-like  toxin-producing E. coli (STEC) stx1/stx2,  Shigella/ Enteroinvasive E. coli (EIEC), Cryptosporidium,  Cyclospora cayetanensis, Entamoeba histolytica,  Giardia lamblia, Adenovirus F 40/41, Astrovirus,  Norovirus GI/GII, Rotavirus A, Sapovirus  Method Type: --  Organism: --  Organism Identification: --  Specimen Source: .Stool Feces       MEDICATIONS  (STANDING):  aMIOdarone    Tablet 400 milliGRAM(s) Oral every 12 hours  aMIOdarone Infusion 1 mG/Min (33.3 mL/Hr) IV Continuous <Continuous>  ascorbic acid 500 milliGRAM(s) Oral daily  aspirin  chewable 81 milliGRAM(s) Oral daily  atorvastatin 40 milliGRAM(s) Oral at bedtime  chlorhexidine 4% Liquid 1 Application(s) Topical <User Schedule>  heparin  Infusion 600 Unit(s)/Hr (6 mL/Hr) IV Continuous <Continuous>  insulin glargine Injectable (LANTUS) 15 Unit(s) SubCutaneous every morning  insulin lispro Injectable (HumaLOG) 5 Unit(s) SubCutaneous three times a day before meals  lactobacillus acidophilus 1 Tablet(s) Oral three times a day  metoprolol tartrate 12.5 milliGRAM(s) Oral every 6 hours  pantoprazole    Tablet 40 milliGRAM(s) Oral before breakfast  sertraline 25 milliGRAM(s) Oral daily  tiotropium 18 MICROgram(s) Capsule 1 Capsule(s) Inhalation daily    MEDICATIONS  (PRN):  acetaminophen   Tablet .. 650 milliGRAM(s) Oral every 6 hours PRN Moderate Pain (4 - 6)  aluminum hydroxide/magnesium hydroxide/simethicone Suspension 15 milliLiter(s) Oral once PRN Dyspepsia  guaifenesin/dextromethorphan  Syrup 10 milliLiter(s) Oral every 6 hours PRN Cough  loperamide 2 milliGRAM(s) Oral every 4 hours PRN Diarrhea

## 2020-05-20 NOTE — DISCHARGE NOTE NURSING/CASE MANAGEMENT/SOCIAL WORK - PATIENT PORTAL LINK FT
You can access the FollowMyHealth Patient Portal offered by [facility] by registering at the following website: [email or web address]. By joining [facility]’s FollowMyHealth portal, you will also be able to view your health information using other applications (apps) compatible with our system.

## 2020-05-20 NOTE — PROGRESS NOTE ADULT - SUBJECTIVE AND OBJECTIVE BOX
[place] NEPHROLOGY FOLLOW UP NOTE  --------------------------------------------------------------------------------  24 hour events/subjective: Patient examined. Appears comfortable.    PAST HISTORY  --------------------------------------------------------------------------------  No significant changes to PMH, PSH, FHx, SHx, unless otherwise noted    ALLERGIES & MEDICATIONS  --------------------------------------------------------------------------------  Allergies    No Known Allergies    Standing Inpatient Medications  aMIOdarone    Tablet 400 milliGRAM(s) Oral every 12 hours  aMIOdarone Infusion 1 mG/Min IV Continuous <Continuous>  ascorbic acid 500 milliGRAM(s) Oral daily  aspirin  chewable 81 milliGRAM(s) Oral daily  atorvastatin 40 milliGRAM(s) Oral at bedtime  chlorhexidine 4% Liquid 1 Application(s) Topical <User Schedule>  heparin  Infusion 600 Unit(s)/Hr IV Continuous <Continuous>  insulin glargine Injectable (LANTUS) 15 Unit(s) SubCutaneous every morning  insulin lispro Injectable (HumaLOG) 5 Unit(s) SubCutaneous three times a day before meals  lactobacillus acidophilus 1 Tablet(s) Oral three times a day  metoprolol tartrate 12.5 milliGRAM(s) Oral every 6 hours  pantoprazole    Tablet 40 milliGRAM(s) Oral before breakfast  sertraline 25 milliGRAM(s) Oral daily  sodium bicarbonate 650 milliGRAM(s) Oral every 12 hours  tiotropium 18 MICROgram(s) Capsule 1 Capsule(s) Inhalation daily    PRN Inpatient Medications  acetaminophen   Tablet .. 650 milliGRAM(s) Oral every 6 hours PRN  aluminum hydroxide/magnesium hydroxide/simethicone Suspension 15 milliLiter(s) Oral once PRN  guaifenesin/dextromethorphan  Syrup 10 milliLiter(s) Oral every 6 hours PRN  loperamide 2 milliGRAM(s) Oral every 4 hours PRN    VITALS/PHYSICAL EXAM  --------------------------------------------------------------------------------  T(C): 36.1 (05-20-20 @ 06:41), Max: 36.3 (05-19-20 @ 20:09)  HR: 84 (05-20-20 @ 06:41) (80 - 97)  BP: 112/58 (05-20-20 @ 06:41) (103/57 - 116/68)  RR: 18 (05-20-20 @ 06:41) (16 - 18)  SpO2: 97% (05-19-20 @ 19:45) (97% - 97%)  Wt(kg): --  Height (cm): 157.5 (05-19-20 @ 16:59)  Weight (kg): 56.8 (05-19-20 @ 16:59)  BMI (kg/m2): 22.9 (05-19-20 @ 16:59)  BSA (m2): 1.57 (05-19-20 @ 16:59)      05-19-20 @ 07:01  -  05-20-20 @ 07:00  --------------------------------------------------------  IN: 342 mL / OUT: 1100 mL / NET: -758 mL    05-20-20 @ 07:01  -  05-20-20 @ 12:02  --------------------------------------------------------  IN: 0 mL / OUT: 210 mL / NET: -210 mL      Physical Exam:  	Gen: NAD  	Pulm: CTA B/L  	CV: RRR, S1S2  	Abd: +BS, soft, nontender/nondistended  	: No suprapubic tenderness  	LE: Warm,   edema  	Vascular access: temp HD cath    LABS/STUDIES  --------------------------------------------------------------------------------              9.4    4.62  >-----------<  114      [05-20-20 @ 06:00]              30.4     138  |  104  |  58  ----------------------------<  97      [05-20-20 @ 06:00]  4.3   |  21  |  5.1        Ca     8.5     [05-20-20 @ 06:00]      Mg     2.1     [05-20-20 @ 06:00]      Phos  5.0     [05-20-20 @ 06:00]    TPro  5.3  /  Alb  3.2  /  TBili  0.3  /  DBili  x   /  AST  8   /  ALT  6   /  AlkPhos  52  [05-20-20 @ 06:00]    PT/INR: PT 13.30, INR 1.16       [05-19-20 @ 04:50]  PTT: 53.6       [05-20-20 @ 06:00]          [05-19-20 @ 04:50]    Creatinine Trend:  SCr 5.1 [05-20 @ 06:00]  SCr 5.8 [05-19 @ 04:50]  SCr 5.3 [05-18 @ 05:15]  SCr 4.8 [05-17 @ 16:00]  SCr 4.8 [05-17 @ 04:30]    Urinalysis - [05-11-20 @ 22:30]      Color Yellow / Appearance Turbid / SG 1.005 / pH 7.0      Gluc Negative / Ketone Negative  / Bili Negative / Urobili <2 mg/dL       Blood Moderate / Protein 100 mg/dL / Leuk Est Large / Nitrite Negative      RBC 3 / WBC >720 / Hyaline 8 / Gran  / Sq Epi  / Non Sq Epi 3 / Bacteria Moderate      Iron 24, TIBC 204, %sat 12      [08-30-19 @ 07:16]  Ferritin 401      [05-19-20 @ 06:25]  HbA1c 5.7      [11-09-19 @ 01:20]  TSH 0.58      [08-02-19 @ 18:37]  Lipid: chol 153, , HDL 45, LDL 75      [08-30-19 @ 07:16]    HBsAb Nonreact      [05-19-20 @ 14:58]  HBsAg Nonreact      [05-19-20 @ 14:58]  HCV 0.33, Nonreact      [05-19-20 @ 14:58]    Free Light Chains: kappa 6.76, lambda 3.58, ratio = 1.89      [05-19 @ 04:50]  Immunofixation Serum:   No Monoclonal Band Identified    Reference Range: None Detected      [05-19-20 @ 04:50]  SPEP Interpretation: Hypogammaglobulinemia      [05-19-20 @ 04:50]

## 2020-05-20 NOTE — CONSULT NOTE ADULT - ASSESSMENT
IMPRESSION: Rehab of gait dysfunction     PRECAUTIONS: [  ] Cardiac  [  ] Respiratory  [  ] Seizures [  ] Contact Isolation  [  ] Droplet Isolation  [  ] Other    Weight Bearing Status:     RECOMMENDATION:    Out of Bed to Chair     DVT/Decubiti Prophylaxis    REHAB PLAN:     [x   ] Bedside P/T 3-5 times a week   [   ]   Bedside O/T  2-3 times a week             [   ] No Rehab Therapy Indicated                   [   ]  Speech Therapy   Conditioning/ROM                                    ADL  Bed Mobility                                               Conditioning/ROM  Transfers                                                     Bed Mobility  Sitting /Standing Balance                         Transfers                                        Gait Training                                               Sitting/Standing Balance  Stair Training [   ]Applicable                    Home equipment Eval                                                                        Splinting  [   ] Only      GOALS:   ADL   [   ]   Independent                    Transfers  [  x ] Independent                          Ambulation  [ x  ] Independent     [   x ] With device                            [   ]  CG                                                         [   ]  CG                                                                  [   ] CG                            [    ] Min A                                                   [   ] Min A                                                              [   ] Min  A          DISCHARGE PLAN:   [   ]  Good candidate for Intensive Rehabilitation/Hospital based                                             Will tolerate 3hrs Intensive Rehab Daily                                       [    ]  Short Term Rehab in Skilled Nursing Facility                                       [ x   ]  Home with Outpatient or  services                                         [    ]  Possible Candidate for Intensive Hospital based Rehab

## 2020-05-20 NOTE — PROGRESS NOTE ADULT - ASSESSMENT
70 y/o female with pmh of DM, stomach ca, bladder prolapse, heart failure, s/p CABG 10/2019 presents complaining of abdominal distension. Admitted for acute on chronic systolic CHF exacerbation and ARF requiring HD.     # Acute on chronic HFrEF exacerbation with hypotension 2/2 uncontrolled A flutter   # Atrial Flutter, now in NSR   - 2D-Echo reviewed, EF is 20-25%    - s/p DVVC 5/15, preceded by JAMILA that was negative for any blood clots  - s/p Amio infusion, on PO amio for 2 weeks (starting 5/14), switch to Amio 200 mg daily from 5/27  - Patient started on heparin gtt (post DCCV anti-coagulation), PTT therapeutic   - Holding HF meds for now - carvedilol, Valtessa, Ivabradine, Enteresto  - c/w metoprolol 12.5 q6h, rate controlled     # RIC secondary to ATN vs AIN (+urine eosinophils) vs cardiorenal (EF 20-30%, new)  - monitor UOP   - planned for renal biopsy tomorrow, hold heparin gtt 4-6 hrs prior   - NPO after MN   - will need Tesio catheter placement, possibly tomorrow by IR   - nephr following     # UTI in setting of chronic Castellon    - UA positive, UCx positive for ALLEN  - Completed a full course of ABX.     # Lt Pleural effusion  - fluid removal as tolerated via HD     # Chronic MARIXA   - g IV Iron infusion every 2-3 months      # DM2  - c/w insulin        diet: Nephro/fluid restriction  dvt ppx: heparin gtt   gi ppx: on ppi  full code  dispo: from home       #Progress Note Handoff  Pending (specify):  renal biopsy tomorrow   Family discussion: dw pt plan as above   Disposition: Home

## 2020-05-20 NOTE — PROGRESS NOTE ADULT - SUBJECTIVE AND OBJECTIVE BOX
GUS WILBURN 69y Female  MRN#: [id number]   Hospital Day: 9d    SUBJECTIVE  Patient is a 69y old Female who presents with a chief complaint of sob (20 May 2020 11:52)  Currently admitted to medicine with the primary diagnosis of CHF (congestive heart failure)    INTERVAL HPI AND OVERNIGHT EVENTS:  Patient was examined and seen at bedside. This morning she is resting comfortably in bed and reports no issues or overnight events.    REVIEW OF SYMPTOMS:  CONSTITUTIONAL: No weakness, fevers or chills; No headaches  EYES: No visual changes, eye pain, or discharge  ENT: No vertigo; No ear pain or change in hearing; No sore throat or difficulty swallowing  NECK: No pain or stiffness  RESPIRATORY: No cough, wheezing, or hemoptysis; No shortness of breath  CARDIOVASCULAR: No chest pain or palpitations  GASTROINTESTINAL: No abdominal or epigastric pain; nausea, no vomiting or hematemesis; No diarrhea or constipation; No melena or hematochezia  GENITOURINARY: No dysuria, frequency or hematuria  MUSCULOSKELETAL: No joint pain, no muscle pain, no weakness  NEUROLOGICAL: No numbness or weakness  SKIN: No itching or rashes    OBJECTIVE  PAST MEDICAL & SURGICAL HISTORY  Heart failure  Female bladder prolapse  Diabetes  S/P CABG (coronary artery bypass graft)  History of repair of hip fracture    ALLERGIES:  No Known Allergies    MEDICATIONS:  STANDING MEDICATIONS  aMIOdarone    Tablet 400 milliGRAM(s) Oral every 12 hours  aMIOdarone Infusion 1 mG/Min IV Continuous <Continuous>  ascorbic acid 500 milliGRAM(s) Oral daily  aspirin  chewable 81 milliGRAM(s) Oral daily  atorvastatin 40 milliGRAM(s) Oral at bedtime  chlorhexidine 4% Liquid 1 Application(s) Topical <User Schedule>  heparin  Infusion 600 Unit(s)/Hr IV Continuous <Continuous>  insulin glargine Injectable (LANTUS) 15 Unit(s) SubCutaneous every morning  insulin lispro Injectable (HumaLOG) 5 Unit(s) SubCutaneous three times a day before meals  lactobacillus acidophilus 1 Tablet(s) Oral three times a day  metoprolol tartrate 12.5 milliGRAM(s) Oral every 6 hours  pantoprazole    Tablet 40 milliGRAM(s) Oral before breakfast  sertraline 25 milliGRAM(s) Oral daily  tiotropium 18 MICROgram(s) Capsule 1 Capsule(s) Inhalation daily    PRN MEDICATIONS  acetaminophen   Tablet .. 650 milliGRAM(s) Oral every 6 hours PRN  aluminum hydroxide/magnesium hydroxide/simethicone Suspension 15 milliLiter(s) Oral once PRN  guaifenesin/dextromethorphan  Syrup 10 milliLiter(s) Oral every 6 hours PRN  loperamide 2 milliGRAM(s) Oral every 4 hours PRN      VITAL SIGNS: Last 24 Hours  T(C): 36 (20 May 2020 12:23), Max: 36.3 (19 May 2020 20:09)  T(F): 96.8 (20 May 2020 12:23), Max: 97.3 (19 May 2020 20:09)  HR: 80 (20 May 2020 12:23) (80 - 97)  BP: 106/59 (20 May 2020 12:23) (103/57 - 112/70)  BP(mean): --  RR: 18 (20 May 2020 12:23) (16 - 18)  SpO2: 97% (19 May 2020 19:45) (97% - 97%)    LABS:                        9.4    4.62  )-----------( 114      ( 20 May 2020 06:00 )             30.4     05-20    138  |  104  |  58<H>  ----------------------------<  97  4.3   |  21  |  5.1<HH>    Ca    8.5      20 May 2020 06:00  Phos  5.0     05-20  Mg     2.1     05-20    TPro  5.3<L>  /  Alb  3.2<L>  /  TBili  0.3  /  DBili  x   /  AST  8   /  ALT  6   /  AlkPhos  52  05-20    PT/INR - ( 19 May 2020 04:50 )   PT: 13.30 sec;   INR: 1.16 ratio      PTT - ( 20 May 2020 06:00 )  PTT:53.6 sec      RADIOLOGY:  < from: Xray Chest 1 View- PORTABLE-Routine (05.19.20 @ 06:11) >  Findings:  Support devices: Right IJ central line is stable. Telemetry leads overlie patient.  Cardiac/mediastinum/hilum: Stable significant cardiomegaly and prominent mediastinum. Status post sternotomy with postsurgical change  Lung parenchyma/Pleura: Stable left mid and lower lung field opacity with bilateral interstitial opacities  Skeleton/soft tissues: Stable    Impression:    Stable left mid and lower lung field opacity with bilateral interstitial opacities  < end of copied text >    < from: Transthoracic Echocardiogram (05.17.20 @ 07:10) >  Summary:   1. Left ventricular ejection fraction, by visual estimation, is 20 to 25%.   2. Severely decreased global left ventricular systolic function.   3. The mitral valve leaflets are tethered due to reduced systolic function and elevated LVDP.   4. Moderate mitral annular calcification.   5. Moderate-to-severe mitral regurgitation.   6. Peak transaortic gradient equals 25.2 mmHg, mean transaortic gradient equals 8.8 mmHg, the calculated aortic valve area equals 1.07 cm² by the continuity equation consistent with moderate aortic stenosis (possible LFLG).   7. Estimated JENNIFER = 1.24 cm2 by planimetry.   8. Trivial aortic regurgitation.   9. Moderate tricuspid regurgitation.  10. Estimated pulmonary artery systolic pressure is 44.5 mmHg assuming a right atrial pressure of 8 mmHg, which is consistent with mild pulmonary hypertension.  11. LA volume Index is 71.0 ml/m² ml/m2.  12. Bilateral pleural effusions.    < end of copied text >    < from: US Renal (05.12.20 @ 06:28) >  FINDINGS:  RIGHT KIDNEY: Heterogeneous in echotexture, normal in size measuring 11.1 cm in length. Perinephric fluid. No evidence of hydronephrosis, calculus. Vascular flow is demonstrated at the hilum.  LEFT KIDNEY: Normal in echogenicity, size measuring 10.7 cm in length. Perinephric fluid. No evidence of hydronephrosis, calculus. Vascular flow is demonstrated at the hilum.   URINARY BLADDER: Collapsed by Castellon catheter. Nonspecific echogenic material is seen within the collapsed bladder  Note: Large amount of ascites. Gallstones    IMPRESSION:  Nonspecific heterogeneous echotexture to the right kidney. When the patient's current symptoms improve, repeat renal ultrasound is recommended.  No hydronephrosis.  Nonspecific echogenic material within a collapsed bladder. A bladder ultrasound is recommended when the bladder can be fully distended.  Large amount of ascites.  < end of copied text >    PHYSICAL EXAM:  CONSTITUTIONAL: No acute distress, well-developed, well-groomed, AAOx3  HEAD: Atraumatic, normocephalic  EYES: EOM intact, PERRLA, conjunctiva and sclera clear  ENT: Supple, no masses, no thyromegaly, no bruits, no JVD; moist mucous membranes  PULMONARY: Clear to auscultation bilaterally; no wheezes, rales, or rhonchi  CARDIOVASCULAR: Regular rate and rhythm; no murmurs, rubs, or gallops  GASTROINTESTINAL: Soft, non-tender, non-distended; bowel sounds present  MUSCULOSKELETAL: 2+ peripheral pulses; no clubbing, no cyanosis, 2+ pitting edema in bilateral lower extremities  NEUROLOGY: non-focal  SKIN: No rashes or lesions; warm and dry    ASSESSMENT & PLAN  Patient is a 69yo female with PMH of heart failure with reduced ejection fraction, CAD s/p CABG 10/2019, stomach cancer, bladder prolapse, diabetes mellitus, paroxysmal atrial fibrillation, and history of lower GI bleed who presented with abdominal distention for the 2 days' duration which has been worsening. On admission, patient was found to have RIC and was in acute on chronic  heart failure exacerbation in setting of atrial flutter. Patient was admitted to the ICU for acute renal failure and was downgraded on 5/19/2020.    #Acute cardiogenic shock in setting of atrial flutter s/p cardioversion 5/15/2020  - Echo 5/17/2020: EF 20-25%, severely decreased LV systolic function, moderate-severe MR, moderate AS, moderate TR, mild pulmonary hypertension, bilateral pleural effusions  - Pro-BNP on admission: 26,469  - Troponin: 0.23->0.23->0.25->0.21  - ECG on admission showed atrial flutter  - Recent outpatient echocardiogram showed EF 45-50% per patient  - EP consult appreciated  - Patient underwent cardioversion on 5/15/2020  - Continue with amiodarone 400mg PO Q12H for 7 days (Day #6)  - Will transition to amiodarone 200mg PO QD  - Continue with heparin drip; goal PTT 60-80  - Consider transitioning anticoagulation to Eliquis 2.5mg PO BID  - Continue with metoprolol 12.5mg PO Q6H  - Heart failure consult appreciated  - Holding carvedilol, Valtessa, Ivabradine, and Entresto for now give RIC on HD  - Will need to discuss need for AICD with EP  - If blood pressure can tolerate it, will increase metoprolol tartrate tomorrow to 25mg PO Q8H    #RIC on CKD stage 4 currently on hemodialysis  - Baseline creatinine: 0.9 in 10/2019  - Creatinine today: 5.8  - Patient is anuric and failed furosemide challenge (180mg)  - UA noted for eosinophils, suggestive of interstitial nephritis  - Patient was on ciprofloxacin outpatient for UTI  - Nephrology consult appreciated  - Discontinue sodium bicarbonate  - FeNa >1%  - US abdomen: echogenic material around Castellon catheter in urinary bladder  - Patient to go for renal biopsy tomorrow with IR  - Will need Tesio catheter for dialysis upon discharge per nephrology    #CAD s/p CABG 10/2019  - Continue with metoprolol 12.5mg PO Q6H  - Continue with aspirin 81mg PO QD  - Continue with atorvastatin 40mg PO QHS  - Heart failure consult appreciated  - If blood pressure can tolerate it, will increase metoprolol tartrate tomorrow to 25mg PO Q8H    #UTI in setting of chronic Castellon catheter, resolved  - Urine Cx 5/11/2020: ALLEN  - Completed course of nafcillin    #Left-sided pleural effusion s/p thoracentesis x2  - Thoracentesis done on admission, which drained 300mL of transudative fluid  - Repeat chest X-ray showed re-accumulation of fluid  - Thoracentesis repeated on 5/14/2020, which drained 650mL  - Pulmonology consult appreciated: follow outpatient    #History of GI bleed 10/2019  - Per patient, outpatient colonoscopy revealed "rectal ulcer"  - Receives IV iron infusions every 2-3 months  - Follow outpatient with gastroenterology    #Diabetes mellitus type 2 with history of DKA in 10/2019  - Hemoglobin A1c 5/12/2020: 5.8  - Continue with insulin glargine 15 units SQ QHS  - Continue with insulin lispro 5 units SQ TID  - Insulin sliding scale coverage  - Monitor fingerstick glucose    #History of stomach cancer  - Follow outpatient with oncology    #Bladder prolapse  - Continue with Castellon catheter    #Depression  - Continue with sertraline 25mg PO QD    #Misc  - DVT Prophylaxis: heparin drip  - GI Prophylaxis: pantoprazole 40mg PO QD   - Diet: NPO after midnight  - Activity: ambulate as tolerated  - IV Fluids: not indicated  - Code Status: Full Code    Dispo: pending renal biopsy by IR tomorrow

## 2020-05-20 NOTE — PROGRESS NOTE ADULT - PROBLEM SELECTOR PLAN 1
Decreased LVEF from ~ 45 to 15% likely related to atrial flutter with RVR   She remains fluid overloaded   Continue HD for volume management   Continue BB, increase metoprolol to 25 mg tid starting tomorrow  Strict I/Os  Daily weight  DC planning after kidney biopsy and vascular access

## 2020-05-20 NOTE — CONSULT NOTE ADULT - SUBJECTIVE AND OBJECTIVE BOX
HPI:  68 y/o female with pmh of DM, stomach ca, bladder prolapse, heart failure, s/p CABG 10/2019 presents complaining of abdominal distension for the past couple of days which has been worsening.  Patient states that her symptoms are similar to when she has urinary tract infections. She has had two other episodes of similar presenting symptoms since having an indwelling montejo catheter placed after her CABG (10/2019) as mentioned.  Patient was treated with cipro as outpt; reports decreased PO fluids since the symptoms began.  Patient denies fever/chills, abd pain, n/v/d, cough, chest pain, SOB. (12 May 2020 00:57)      PAST MEDICAL & SURGICAL HISTORY:  Heart failure  Female bladder prolapse  Diabetes  S/P CABG (coronary artery bypass graft)  History of repair of hip fracture      Hospital Course:    TODAY'S SUBJECTIVE & REVIEW OF SYMPTOMS:     Constitutional WNL   Cardio WNL   Resp WNL   GI WNL  Heme WNL  Endo WNL  Skin WNL  MSK Weakness  Neuro WNL  Cognitive WNL  Psych WNL      MEDICATIONS  (STANDING):  aMIOdarone    Tablet 400 milliGRAM(s) Oral every 12 hours  aMIOdarone Infusion 1 mG/Min (33.3 mL/Hr) IV Continuous <Continuous>  ascorbic acid 500 milliGRAM(s) Oral daily  aspirin  chewable 81 milliGRAM(s) Oral daily  atorvastatin 40 milliGRAM(s) Oral at bedtime  chlorhexidine 4% Liquid 1 Application(s) Topical <User Schedule>  insulin glargine Injectable (LANTUS) 15 Unit(s) SubCutaneous every morning  insulin lispro Injectable (HumaLOG) 5 Unit(s) SubCutaneous three times a day before meals  lactobacillus acidophilus 1 Tablet(s) Oral three times a day  metoprolol tartrate 12.5 milliGRAM(s) Oral every 6 hours  pantoprazole    Tablet 40 milliGRAM(s) Oral before breakfast  sertraline 25 milliGRAM(s) Oral daily  sodium bicarbonate 650 milliGRAM(s) Oral every 12 hours  tiotropium 18 MICROgram(s) Capsule 1 Capsule(s) Inhalation daily    MEDICATIONS  (PRN):  acetaminophen   Tablet .. 650 milliGRAM(s) Oral every 6 hours PRN Moderate Pain (4 - 6)  aluminum hydroxide/magnesium hydroxide/simethicone Suspension 15 milliLiter(s) Oral once PRN Dyspepsia  guaifenesin/dextromethorphan  Syrup 10 milliLiter(s) Oral every 6 hours PRN Cough  loperamide 2 milliGRAM(s) Oral every 4 hours PRN Diarrhea      FAMILY HISTORY:  FH: myocardial infarction (Mother)  FH: stomach cancer (Mother)      Allergies    No Known Allergies    Intolerances        SOCIAL HISTORY:    [  ] Etoh  [  ] Smoking  [  ] Substance abuse     Home Environment:  [  ] Home Alone  [ x ] Lives with Family  [  ] Home Health Aid    Dwelling:  [  ] Apartment  [x  ] Private House  [  ] Adult Home  [  ] Skilled Nursing Facility      [  ] Short Term  [  ] Long Term  [ x ] Stairs       Elevator [  ]    FUNCTIONAL STATUS PTA: (Check all that apply)  Ambulation: [x   ]Independent    [  ] Dependent     [  ] Non-Ambulatory  Assistive Device: [  ] SA Cane  [  ]  Q Cane  [  ] Walker  [  ]  Wheelchair  ADL : [x  ] Independent  [  ]  Dependent       Vital Signs Last 24 Hrs  T(C): 36.1 (20 May 2020 06:41), Max: 36.3 (19 May 2020 20:09)  T(F): 96.9 (20 May 2020 06:41), Max: 97.3 (19 May 2020 20:09)  HR: 84 (20 May 2020 06:41) (80 - 97)  BP: 112/58 (20 May 2020 06:41) (98/58 - 116/68)  BP(mean): 82 (19 May 2020 11:00) (76 - 82)  RR: 18 (20 May 2020 06:41) (13 - 18)  SpO2: 97% (19 May 2020 19:45) (96% - 97%)      PHYSICAL EXAM: Alert & Oriented X3  GENERAL: NAD, well-groomed, well-developed  HEAD:  Atraumatic, Normocephalic  CHEST/LUNG: Clear   HEART: S1S2+  ABDOMEN: Soft, Nontender  EXTREMITIES:  no calf tenderness    NERVOUS SYSTEM:  Cranial Nerves 2-12 intact [  ] Abnormal  [  ]  ROM: WFL all extremities [ x ]  Abnormal [  ]  Motor Strength: WFL all extremities  [x  ]  Abnormal [  ]  Sensation: intact to light touch [ x ] Abnormal [  ]  Reflexes: Symmetric [  ]  Abnormal [  ]    FUNCTIONAL STATUS:  Bed Mobility: Independent [  ]  Supervision [  ]  Needs Assistance [ x ]  N/A [  ]  Transfers: Independent [  ]  Supervision [  ]  Needs Assistance [ x ]  N/A [  ]   Ambulation: Independent [  ]  Supervision [  ]  Needs Assistance [  ]  N/A [  ]  ADL: Independent [  ] Requires Assistance [  ] N/A [  ]      LABS:                        9.4    4.62  )-----------( 114      ( 20 May 2020 06:00 )             30.4     05-20    138  |  104  |  58<H>  ----------------------------<  97  4.3   |  21  |  5.1<HH>    Ca    8.5      20 May 2020 06:00  Phos  5.0     05-20  Mg     2.1     05-20    TPro  5.3<L>  /  Alb  3.2<L>  /  TBili  0.3  /  DBili  x   /  AST  8   /  ALT  6   /  AlkPhos  52  05-20    PT/INR - ( 19 May 2020 04:50 )   PT: 13.30 sec;   INR: 1.16 ratio         PTT - ( 20 May 2020 06:00 )  PTT:53.6 sec      RADIOLOGY & ADDITIONAL STUDIES:    Assesment:

## 2020-05-20 NOTE — PROGRESS NOTE ADULT - SUBJECTIVE AND OBJECTIVE BOX
Interval history: Patient downgraded to the telemetry, she remains requiring HD, still fluid overloaded. She remains fluid overloaded.         HPI:    68 y/o F with h/o DM, CAD s/p CABG, AF/flutter not on A/C due to GI bleeding pending work up (NUBIA clipped during CABG), ICM, chronic systolic heart failure, with LVEF 20% > 45% > 25% now, admitted from from with complaint of fatigue, low blood pressure. Patient was found to be hypotensive to the 70s upon arrive for which she was started on pressors and admitted to ICU. Her mental status has remained intact. Creat was  4.9 with BUN 99. Upon admission patient was in atrial flutter with rate 130-140s. An echocardiogram showed drop in LV systolic function to ~ 30-35%.       PMH:  DM, stomach ca, bladder prolapse, heart failure    PSH: CABG     Social: Denies smoking or ETOH       Physical exam     General: AAO x3, no acute distress   Eyes: Clear eyes   Neck: Trachea is central, JVD +   Lungs: Decreased respiratory sounds on left side  Heart: Regular  tachycardic, regular heart sounds, + apical murmurs appreciated.    GI: Abdomen is soft, NT  Neuro: Normal strength  Psych: Calm affect

## 2020-05-20 NOTE — PROGRESS NOTE ADULT - SUBJECTIVE AND OBJECTIVE BOX
SUBJ: feels better, OOb and ambulating with assisstance      MEDICATIONS  (STANDING):  aMIOdarone    Tablet 400 milliGRAM(s) Oral every 12 hours  aMIOdarone Infusion 1 mG/Min (33.3 mL/Hr) IV Continuous <Continuous>  ascorbic acid 500 milliGRAM(s) Oral daily  aspirin  chewable 81 milliGRAM(s) Oral daily  atorvastatin 40 milliGRAM(s) Oral at bedtime  chlorhexidine 4% Liquid 1 Application(s) Topical <User Schedule>  heparin  Infusion 600 Unit(s)/Hr (6 mL/Hr) IV Continuous <Continuous>  insulin glargine Injectable (LANTUS) 15 Unit(s) SubCutaneous every morning  insulin lispro Injectable (HumaLOG) 5 Unit(s) SubCutaneous three times a day before meals  lactobacillus acidophilus 1 Tablet(s) Oral three times a day  metoprolol tartrate 12.5 milliGRAM(s) Oral every 6 hours  pantoprazole    Tablet 40 milliGRAM(s) Oral before breakfast  sertraline 25 milliGRAM(s) Oral daily  sodium bicarbonate 650 milliGRAM(s) Oral every 12 hours  tiotropium 18 MICROgram(s) Capsule 1 Capsule(s) Inhalation daily    MEDICATIONS  (PRN):  acetaminophen   Tablet .. 650 milliGRAM(s) Oral every 6 hours PRN Moderate Pain (4 - 6)  aluminum hydroxide/magnesium hydroxide/simethicone Suspension 15 milliLiter(s) Oral once PRN Dyspepsia  guaifenesin/dextromethorphan  Syrup 10 milliLiter(s) Oral every 6 hours PRN Cough  loperamide 2 milliGRAM(s) Oral every 4 hours PRN Diarrhea            Vital Signs Last 24 Hrs  T(C): 36.1 (20 May 2020 06:41), Max: 36.3 (19 May 2020 20:09)  T(F): 96.9 (20 May 2020 06:41), Max: 97.3 (19 May 2020 20:09)  HR: 84 (20 May 2020 06:41) (80 - 97)  BP: 112/58 (20 May 2020 06:41) (103/57 - 116/68)  BP(mean): --  RR: 18 (20 May 2020 06:41) (16 - 18)  SpO2: 97% (19 May 2020 19:45) (97% - 97%)     REVIEW OF SYSTEMS:  CONSTITUTIONAL: No fever, weight loss, or fatigue  CARDIOLOGY: PAtient denies chest pain, shortness of breath or syncopal episodes.   RESPIRATORY: denies shortness of breath, wheezeing.   NEUROLOGICAL: NO weakness, no focal deficits to report.  ENDOCRINOLOGICAL: no recent change in diabetic medications.   GI: no BRBPR, no N,V,diarrhea.    PSYCHIATRY: normal mood and affect  HEENT: no nasal discharge, no ecchymosis  SKIN: no ecchymosis, no breakdown  MUSCULOSKELETAL: Full range of motion x4.        PHYSICAL EXAM:  · CONSTITUTIONAL:	Well-developed, well nourished    BMI-  ·RESPIRATORY:   airway patent; breath sounds equal; good air movement; respirations non-labored; clear to auscultation bilaterally; no chest wall tenderness; no intercostal retractions; no rales,rhonchi or wheeze  · CARDIOVASCULAR	regular rate and rhythm  no rub  no murmur  normal PMI  · EXTREMITIES: No cyanosis, clubbing or edema  · VASCULAR: 	Equal and normal pulses (carotid, femoral, dorsalis pedis)  	  TELEMETRY:    ECG:    TTE:    LABS:                        9.4    4.62  )-----------( 114      ( 20 May 2020 06:00 )             30.4     05-20    138  |  104  |  58<H>  ----------------------------<  97  4.3   |  21  |  5.1<HH>    Ca    8.5      20 May 2020 06:00  Phos  5.0     05-20  Mg     2.1     05-20    TPro  5.3<L>  /  Alb  3.2<L>  /  TBili  0.3  /  DBili  x   /  AST  8   /  ALT  6   /  AlkPhos  52  05-20        PT/INR - ( 19 May 2020 04:50 )   PT: 13.30 sec;   INR: 1.16 ratio         PTT - ( 20 May 2020 06:00 )  PTT:53.6 sec    I&O's Summary    19 May 2020 07:01  -  20 May 2020 07:00  --------------------------------------------------------  IN: 342 mL / OUT: 1100 mL / NET: -758 mL    20 May 2020 07:01  -  20 May 2020 11:46  --------------------------------------------------------  IN: 0 mL / OUT: 210 mL / NET: -210 mL      BNP  RADIOLOGY & ADDITIONAL STUDIES:    IMPRESSION AND PLAN:    Afib in NSR  HFrEF due to rapid afib  RIC on HD  rehab

## 2020-05-20 NOTE — PROGRESS NOTE ADULT - ASSESSMENT
1. RIC. ATN (hypotension) vs AIN (+urine eosinophils) vs cardiorenal (EF 20-30%, new). May have had bladder outlet obstruction on admission, renal sonogram showed collapsed bladder and no hydronephrosis. Likely has underlying CKD given long standing proteinuria. Monitor I/Os. Daily BMP. Temporary HD catheter placed. HD TTS schedule. DC PO bicarb. IR consult for kidney biopsy.  2. UTI/sepsis. Completed nafcillin.  3. Hypotension. Off pressor support now. If BP drops, add midodrine 10mg every 8 hours.

## 2020-05-20 NOTE — PROGRESS NOTE ADULT - ASSESSMENT
70 y/o F with h/o CAD s/p CABG, AF/flutter, recurrent UTI/permanent [place], chronic systolic heart failure admitted in mixed shock septic/cardiogenic. S/p thoracentesis, patient found to be in atrial flutter s/p DCCV, now on heparin gtt. LVEF dropped to 30 from ~ 45% about 2 months ago. POC US showed LVEF ~ 15%. Patient was started on hemodialysis, off pressors, transferred to telemetry.

## 2020-05-20 NOTE — PROGRESS NOTE ADULT - SUBJECTIVE AND OBJECTIVE BOX
Patient is a 69y old  Female who presents with a chief complaint of sob (20 May 2020 09:07)        Over Night Events:  Resting in bed on RA.,  No SOB at rest.  no CP         ROS:     All ROS are negative except HPI         PHYSICAL EXAM    ICU Vital Signs Last 24 Hrs  T(C): 36.1 (20 May 2020 06:41), Max: 36.3 (19 May 2020 20:09)  T(F): 96.9 (20 May 2020 06:41), Max: 97.3 (19 May 2020 20:09)  HR: 84 (20 May 2020 06:41) (80 - 97)  BP: 112/58 (20 May 2020 06:41) (98/58 - 116/68)  BP(mean): 82 (19 May 2020 11:00) (76 - 82)  ABP: --  ABP(mean): --  RR: 18 (20 May 2020 06:41) (13 - 18)  SpO2: 97% (19 May 2020 19:45) (96% - 97%)      CONSTITUTIONAL:   Well nourished.  NAD    ENT:   Airway patent,   Mouth with normal mucosa.   No thrush    EYES:   Pupils equal,   Round and reactive to light.    CARDIAC:   Normal rate,   Regular rhythm.    No edema      Vascular:  Normal systolic impulse  No Carotid bruits    RESPIRATORY:   No wheezing  Bilateral BS  Normal chest expansion  Not tachypneic,  No use of accessory muscles    GASTROINTESTINAL:  Abdomen soft,   Non-tender,   No guarding,   + BS    MUSCULOSKELETAL:   Range of motion is not limited,  No clubbing, cyanosis    NEUROLOGICAL:   Alert and oriented   No motor  deficits.    SKIN:   Skin normal color for race,   Warm and dry and intact.   No evidence of rash.    PSYCHIATRIC:   Normal mood and affect.   No apparent risk to self or others.    HEMATOLOGICAL:  No cervical  lymphadenopathy.  no inguinal lymphadenopathy      05-19-20 @ 07:01  -  05-20-20 @ 07:00  --------------------------------------------------------  IN:    heparin Infusion: 102 mL    Oral Fluid: 240 mL  Total IN: 342 mL    OUT:    Indwelling Catheter - Urethral: 100 mL    Other: 1000 mL  Total OUT: 1100 mL    Total NET: -758 mL      05-20-20 @ 07:01 - 05-20-20 @ 09:34  --------------------------------------------------------  IN:  Total IN: 0 mL    OUT:    Voided: 210 mL  Total OUT: 210 mL    Total NET: -210 mL          LABS:                            9.4    4.62  )-----------( 114      ( 20 May 2020 06:00 )             30.4                                               05-20    138  |  104  |  58<H>  ----------------------------<  97  4.3   |  21  |  5.1<HH>    Ca    8.5      20 May 2020 06:00  Phos  5.0     05-20  Mg     2.1     05-20    TPro  5.3<L>  /  Alb  3.2<L>  /  TBili  0.3  /  DBili  x   /  AST  8   /  ALT  6   /  AlkPhos  52  05-20      PT/INR - ( 19 May 2020 04:50 )   PT: 13.30 sec;   INR: 1.16 ratio         PTT - ( 20 May 2020 06:00 )  PTT:53.6 sec                                                                                     LIVER FUNCTIONS - ( 20 May 2020 06:00 )  Alb: 3.2 g/dL / Pro: 5.3 g/dL / ALK PHOS: 52 U/L / ALT: 6 U/L / AST: 8 U/L / GGT: x                                                  GI PCR Panel, Stool (collected 17 May 2020 12:38)  Source: .Stool Feces  Final Report (18 May 2020 02:38):    GI PCR Results: NOT detected    *******Please Note:*******    GI panel PCR evaluates for:    Campylobacter, Plesiomonas shigelloides, Salmonella,    Vibrio, Yersinia enterocolitica, Enteroaggregative    Escherichia coli (EAEC), Enteropathogenic E.coli (EPEC),    Enterotoxigenic E. coli (ETEC) lt/st, Shiga-like    toxin-producing E. coli (STEC) stx1/stx2,    Shigella/ Enteroinvasive E. coli (EIEC), Cryptosporidium,    Cyclospora cayetanensis, Entamoeba histolytica,    Giardia lamblia, Adenovirus F 40/41, Astrovirus,    Norovirus GI/GII, Rotavirus A, Sapovirus                                                                                           MEDICATIONS  (STANDING):  aMIOdarone    Tablet 400 milliGRAM(s) Oral every 12 hours  aMIOdarone Infusion 1 mG/Min (33.3 mL/Hr) IV Continuous <Continuous>  ascorbic acid 500 milliGRAM(s) Oral daily  aspirin  chewable 81 milliGRAM(s) Oral daily  atorvastatin 40 milliGRAM(s) Oral at bedtime  chlorhexidine 4% Liquid 1 Application(s) Topical <User Schedule>  heparin  Infusion 600 Unit(s)/Hr (6 mL/Hr) IV Continuous <Continuous>  insulin glargine Injectable (LANTUS) 15 Unit(s) SubCutaneous every morning  insulin lispro Injectable (HumaLOG) 5 Unit(s) SubCutaneous three times a day before meals  lactobacillus acidophilus 1 Tablet(s) Oral three times a day  metoprolol tartrate 12.5 milliGRAM(s) Oral every 6 hours  pantoprazole    Tablet 40 milliGRAM(s) Oral before breakfast  sertraline 25 milliGRAM(s) Oral daily  sodium bicarbonate 650 milliGRAM(s) Oral every 12 hours  tiotropium 18 MICROgram(s) Capsule 1 Capsule(s) Inhalation daily    MEDICATIONS  (PRN):  acetaminophen   Tablet .. 650 milliGRAM(s) Oral every 6 hours PRN Moderate Pain (4 - 6)  aluminum hydroxide/magnesium hydroxide/simethicone Suspension 15 milliLiter(s) Oral once PRN Dyspepsia  guaifenesin/dextromethorphan  Syrup 10 milliLiter(s) Oral every 6 hours PRN Cough  loperamide 2 milliGRAM(s) Oral every 4 hours PRN Diarrhea      New X-rays reviewed:                                                                                  ECHO    CXR interpreted by me:

## 2020-05-20 NOTE — PHYSICAL THERAPY INITIAL EVALUATION ADULT - PERTINENT HX OF CURRENT PROBLEM, REHAB EVAL
70 y/o female with pmh of DM, stomach ca, bladder prolapse, heart failure, s/p CABG 10/2019 presents complaining of abdominal distension for the past couple of days which has been worsening.

## 2020-05-21 NOTE — CHART NOTE - NSCHARTNOTEFT_GEN_A_CORE
Spoke to IR resident  uncomplicated procedure.   Small hematoma at site  recommend cbc at 8pm Spoke to IR resident  uncomplicated procedure.   Small hematoma at site  recommend cbc at 8pm  - if hb stable at 8pm, will resume heparin drip  - plan to switch to DOAC tomorrow Spoke to IR resident  uncomplicated procedure.   Small hematoma at site  recommend cbc at 8pm  keep heparin drip held  - plan to switch to DOAC tomorrow Spoke to IR resident  uncomplicated procedure.   Small hematoma at site of kidney biopsy on the right.     - will do cbc at 8pm  - keep heparin drip held  - plan to switch to DOAC tomorrow

## 2020-05-21 NOTE — CHART NOTE - NSCHARTNOTEFT_GEN_A_CORE
Registered Dietitian Follow-Up    ***Scroll to the bottom for RD recommendation***    Patient Profile Reviewed                           Yes [x]   No []  Nutrition History Previously Obtained        Yes [x]  No []          PERTINENT SUBJECTIVE INFORMATION:  - pt appears alert and oriented, watching tv.   - pt says she is NPO today so she didn't eat breakfast. But has been eating enough when she is on a diet. NOt a big fan of the food.  - informed her about dialysis diet modifier.   - no oral/GI issue.        PERTINENT MEDICAL INFORMATIONS:  (1) Acute cardiogenic shock in setting of atrial flutter s/p cardioversion 5/15.  (2) RIC on CKD4 now on HD. will need TESIO by Nephro at discharge  (3) CAD on meds. L sided pleural effusion s/p thoracocentesis x2.  (4) On home meds.          DIET ORDER:   DASH/TLC + CC snack + 1000mL fluid restriction        ANTHROPOMETRICS:  - Ht.  157.48cm  - Wt.   (5/18): 61.2kg  (5/20): 56.8kg  (5/21): 64.7kg - unlikely weight gain in one day, will monitor  - BMI. 22.9  - IBW.        PERTINENT LAB DATA: 5/21: h/h 8.8/28.4, BUN 62, Cr 5.7, ALbumin 3.0, GFR 7, HgbA1c 5.8  PERTINENT MEDS:  heparin, aspirin, insulin, metoprolol, amiodarone, acetaminophen, vitamin C< atorvastatin, acidophilus, protonix,       PHYSICAL FINDINGS  - APPEARANCE:        alert and oriented. no edema  - GI FUNCTION:        LBM 5/20 diarrhea per pt, but she says the nurse took care of it  - TUBES:                       - ORAL/MOUTH:      none reported  - SKIN:                        stage 1 to sacral pressure ulcer        NUTRITION REQUIREMENTS  WEIGHT USED:                          61.2kg (5/18)   ESTIMATED ENERGY NEEDS:       CONTINUE [  ]      ADJUST [  x] - now with new HD    ESTIMATED ENERGY NEEDS:         7593-5082 kcal/day (30-35 kcal/kg of ABW) - on HD  ESTIMATED PROTEIN NEEDS:        74-92g/day (1.2-1.5 g/kg of ABW)  ESTIMATED FLUID NEEDS:             Fluid per Renal/ or 1000ml+ urinary output    CURRENT NUTRIENT NEEDS:      likely meeting per pt report          [ x ] PREVIOUS NUTRITION DIAGNOSIS:    (1) inadequate oral intake             [  ] ONGOING        [  x] RESOLVED          PATIENT INTERVENTION:    [x  ] ORAL        [ ] EN/TF     GOAL/EXPECTED OUTCOME:     pt continues to consume and tolerate >75% of all meals through LOS.  INDICATOR/MONITORING:       RD to monitor diet order, energy intake, body composition, nutrition focused physical findings (Renal profile, glucose profile, PO tolerance)  NUTRITION INTERVENTION:        Meals and snacks.       RECS: (1) Because pt now is on DIALYSIS, please add RENAL RESTRICTION to current Carbohydrate Consistent w/ snack and 1000mL fluid restriction diet. (2) Pt does not need DASH/TLC when renal restriction is in-place.

## 2020-05-21 NOTE — PROGRESS NOTE ADULT - SUBJECTIVE AND OBJECTIVE BOX
Interventional Radiology Brief- Operative Note    Procedure:  Tunnelled dialysis catheter and ct guided renal biopsy    Pre-Op Diagnosis: ESRD    Post-Op Diagnosis: same    Attending:   Gloria    Resident:   Alysia    Anesthesia (type):  [ ] General Anesthesia  [ ] Sedation  [ ] Spinal Anesthesia  [ x] Local/Regional    Contrast: none    Estimated Blood Loss:  < 10ml    Condition:   [ ] Critical  [ ] Serious  [ ] Fair   [x ] Good    Findings/Follow up Plan of Care:  - Catheter ready for use  -Small hematoma noted at end of biopsy, which was stable between consecutive CT scan, measuring 5.0 x 3.0cm  - F/U cbc and transfuse PRN  Specimens Removed:  2 18G cores  Implants:  none  Complications:  small perinephric hematoma  Condition/Disposition:  DC to floor    Please call Interventional Radiology [phone] with any questions, concerns, or issues.

## 2020-05-21 NOTE — PROGRESS NOTE ADULT - ATTENDING COMMENTS
Patient seen and examined. No complaints. Planned for renal biopsy and tunneled catheter placement today. Guille start DOAC tomorrow. DC planning once outpatient HD spot is set up.     #Progress Note Handoff  Pending (specify): renal biopsy and tunneled catheter placement today   Family discussion: jack pt plan as above   Disposition: Home

## 2020-05-21 NOTE — PROGRESS NOTE ADULT - SUBJECTIVE AND OBJECTIVE BOX
[place] [facility]PHROLOGY FOLLOW UP NOTE  --------------------------------------------------------------------------------  24 hour events/subjective: Patient examined. Appears comfortable.    PAST HISTORY  --------------------------------------------------------------------------------  No significant changes to PMH, PSH, FHx, SHx, unless otherwise noted    ALLERGIES & MEDICATIONS  --------------------------------------------------------------------------------  Allergies    No Known Allergies    Standing Inpatient Medications  aMIOdarone Infusion 1 mG/Min IV Continuous <Continuous>  ascorbic acid 500 milliGRAM(s) Oral daily  aspirin  chewable 81 milliGRAM(s) Oral daily  atorvastatin 40 milliGRAM(s) Oral at bedtime  chlorhexidine 4% Liquid 1 Application(s) Topical <User Schedule>  heparin  Infusion 600 Unit(s)/Hr IV Continuous <Continuous>  insulin glargine Injectable (LANTUS) 12 Unit(s) SubCutaneous every morning  insulin lispro Injectable (HumaLOG) 4 Unit(s) SubCutaneous three times a day before meals  lactobacillus acidophilus 1 Tablet(s) Oral three times a day  melatonin 3 milliGRAM(s) Oral at bedtime  metoprolol tartrate 25 milliGRAM(s) Oral every 8 hours  pantoprazole    Tablet 40 milliGRAM(s) Oral before breakfast  sertraline 25 milliGRAM(s) Oral daily  tiotropium 18 MICROgram(s) Capsule 1 Capsule(s) Inhalation daily    PRN Inpatient Medications  acetaminophen   Tablet .. 650 milliGRAM(s) Oral every 6 hours PRN  aluminum hydroxide/magnesium hydroxide/simethicone Suspension 15 milliLiter(s) Oral once PRN  guaifenesin/dextromethorphan  Syrup 10 milliLiter(s) Oral every 6 hours PRN  loperamide 2 milliGRAM(s) Oral every 4 hours PRN    VITALS/PHYSICAL EXAM  --------------------------------------------------------------------------------  T(C): 35.5 (05-21-20 @ 05:51), Max: 36.1 (05-20-20 @ 20:34)  HR: 73 (05-21-20 @ 05:51) (73 - 80)  BP: 126/65 (05-21-20 @ 05:51) (106/59 - 126/65)  RR: 18 (05-21-20 @ 05:51) (16 - 18)  SpO2: 97% (05-20-20 @ 19:51) (97% - 97%)  Wt(kg): --  Height (cm): 157.48 (05-20-20 @ 22:47)  Weight (kg): 56.8 (05-20-20 @ 22:47)  BMI (kg/m2): 22.9 (05-20-20 @ 22:47)  BSA (m2): 1.57 (05-20-20 @ 22:47)      05-20-20 @ 07:01  -  05-21-20 @ 07:00  --------------------------------------------------------  IN: 744 mL / OUT: 150 mL / NET: 594 mL      Physical Exam:  	Gen: NAD  	Pulm: CTA B/L  	CV: RRR, S1S2  	Abd: +BS, soft, nontender/nondistended  	: No suprapubic tenderness  	LE: Warm, edema  	Vascular access: temp HD cath    LABS/STUDIES  --------------------------------------------------------------------------------              8.8    4.77  >-----------<  119      [05-21-20 @ 05:26]              28.4     139  |  103  |  62  ----------------------------<  83      [05-21-20 @ 05:26]  4.4   |  21  |  5.7        Ca     8.6     [05-21-20 @ 05:26]      Mg     2.2     [05-21-20 @ 05:26]      Phos  5.4     [05-21-20 @ 05:26]    TPro  5.1  /  Alb  3.0  /  TBili  0.4  /  DBili  x   /  AST  9   /  ALT  6   /  AlkPhos  51  [05-21-20 @ 05:26]    PT/INR: PT 11.80, INR 1.03       [05-21-20 @ 05:26]  PTT: 34.4       [05-21-20 @ 05:26]      Creatinine Trend:  SCr 5.7 [05-21 @ 05:26]  SCr 5.1 [05-20 @ 06:00]  SCr 5.8 [05-19 @ 04:50]  SCr 5.3 [05-18 @ 05:15]  SCr 4.8 [05-17 @ 16:00]    Urinalysis - [05-11-20 @ 22:30]      Color Yellow / Appearance Turbid / SG 1.005 / pH 7.0      Gluc Negative / Ketone Negative  / Bili Negative / Urobili <2 mg/dL       Blood Moderate / Protein 100 mg/dL / Leuk Est Large / Nitrite Negative      RBC 3 / WBC >720 / Hyaline 8 / Gran  / Sq Epi  / Non Sq Epi 3 / Bacteria Moderate    Iron 24, TIBC 204, %sat 12      [08-30-19 @ 07:16]  Ferritin 401      [05-19-20 @ 06:25]  HbA1c 5.7      [11-09-19 @ 01:20]  TSH 0.58      [08-02-19 @ 18:37]  Lipid: chol 153, , HDL 45, LDL 75      [08-30-19 @ 07:16]    HBsAb Nonreact      [05-19-20 @ 14:58]  HBsAg Nonreact      [05-19-20 @ 14:58]  HCV 0.33, Nonreact      [05-19-20 @ 14:58]    Free Light Chains: kappa 6.76, lambda 3.58, ratio = 1.89      [05-19 @ 04:50]  Immunofixation Serum:   No Monoclonal Band Identified    Reference Range: None Detected      [05-19-20 @ 04:50]  SPEP Interpretation: Hypogammaglobulinemia      [05-19-20 @ 04:50]

## 2020-05-21 NOTE — PROGRESS NOTE ADULT - SUBJECTIVE AND OBJECTIVE BOX
GUS WILBURN 69y Female  MRN#: [id number]   Hospital Day: 10d    SUBJECTIVE  Patient is a 69y old Female who presents with a chief complaint of sob (21 May 2020 07:34)  Currently admitted to medicine with the primary diagnosis of CHF (congestive heart failure)    INTERVAL HPI AND OVERNIGHT EVENTS:  Patient was examined and seen at bedside. This morning she is resting comfortably in bed and reports no issues or overnight events.    REVIEW OF SYMPTOMS:  CONSTITUTIONAL: No weakness, fevers or chills; No headaches  EYES: No visual changes, eye pain, or discharge  ENT: No vertigo; No ear pain or change in hearing; No sore throat or difficulty swallowing  NECK: No pain or stiffness  RESPIRATORY: No cough, wheezing, or hemoptysis; No shortness of breath  CARDIOVASCULAR: No chest pain or palpitations  GASTROINTESTINAL: No abdominal or epigastric pain; No nausea, vomiting, or hematemesis; No diarrhea or constipation; No melena or hematochezia  GENITOURINARY: No dysuria, frequency or hematuria  MUSCULOSKELETAL: No joint pain, no muscle pain, no weakness  NEUROLOGICAL: No numbness or weakness  SKIN: No itching or rashes    OBJECTIVE  PAST MEDICAL & SURGICAL HISTORY  Heart failure  Female bladder prolapse  Diabetes  S/P CABG (coronary artery bypass graft)  History of repair of hip fracture    ALLERGIES:  No Known Allergies    MEDICATIONS:  STANDING MEDICATIONS  aMIOdarone Infusion 1 mG/Min IV Continuous <Continuous>  ascorbic acid 500 milliGRAM(s) Oral daily  aspirin  chewable 81 milliGRAM(s) Oral daily  atorvastatin 40 milliGRAM(s) Oral at bedtime  chlorhexidine 4% Liquid 1 Application(s) Topical <User Schedule>  heparin  Infusion 600 Unit(s)/Hr IV Continuous <Continuous>  insulin glargine Injectable (LANTUS) 12 Unit(s) SubCutaneous every morning  insulin lispro Injectable (HumaLOG) 4 Unit(s) SubCutaneous three times a day before meals  lactobacillus acidophilus 1 Tablet(s) Oral three times a day  melatonin 3 milliGRAM(s) Oral at bedtime  metoprolol tartrate 25 milliGRAM(s) Oral every 8 hours  pantoprazole    Tablet 40 milliGRAM(s) Oral before breakfast  sertraline 25 milliGRAM(s) Oral daily  tiotropium 18 MICROgram(s) Capsule 1 Capsule(s) Inhalation daily    PRN MEDICATIONS  acetaminophen   Tablet .. 650 milliGRAM(s) Oral every 6 hours PRN  aluminum hydroxide/magnesium hydroxide/simethicone Suspension 15 milliLiter(s) Oral once PRN  guaifenesin/dextromethorphan  Syrup 10 milliLiter(s) Oral every 6 hours PRN  loperamide 2 milliGRAM(s) Oral every 4 hours PRN      VITAL SIGNS: Last 24 Hours  T(C): 35.5 (21 May 2020 05:51), Max: 36.1 (20 May 2020 20:34)  T(F): 95.9 (21 May 2020 05:51), Max: 97 (20 May 2020 20:34)  HR: 73 (21 May 2020 05:51) (73 - 80)  BP: 126/65 (21 May 2020 05:51) (106/59 - 126/65)  BP(mean): --  RR: 18 (21 May 2020 05:51) (16 - 18)  SpO2: 97% (20 May 2020 19:51) (97% - 97%)    LABS:                        8.8    4.77  )-----------( 119      ( 21 May 2020 05:26 )             28.4     05-21    139  |  103  |  62<HH>  ----------------------------<  83  4.4   |  21  |  5.7<HH>    Ca    8.6      21 May 2020 05:26  Phos  5.4     05-21  Mg     2.2     05-21    TPro  5.1<L>  /  Alb  3.0<L>  /  TBili  0.4  /  DBili  x   /  AST  9   /  ALT  6   /  AlkPhos  51  05-21    PT/INR - ( 21 May 2020 05:26 )   PT: 11.80 sec;   INR: 1.03 ratio      PTT - ( 21 May 2020 05:26 )  PTT:34.4 sec      RADIOLOGY:  No interval imaging performed     PHYSICAL EXAM:  CONSTITUTIONAL: No acute distress, well-developed, well-groomed, AAOx3  HEAD: Atraumatic, normocephalic  EYES: EOM intact, PERRLA, conjunctiva and sclera clear  ENT: Supple, no masses, no thyromegaly, no bruits, no JVD; moist mucous membranes  PULMONARY: Clear to auscultation bilaterally; no wheezes, rales, or rhonchi  CARDIOVASCULAR: Regular rate and rhythm; no murmurs, rubs, or gallops  GASTROINTESTINAL: Soft, non-tender, non-distended; bowel sounds present  MUSCULOSKELETAL: 2+ peripheral pulses; no clubbing, no cyanosis, 2+ pitting edema in bilateral lower extremities  NEUROLOGY: non-focal  SKIN: No rashes or lesions; warm and dry    ASSESSMENT & PLAN  Patient is a 67yo female with PMH of heart failure with reduced ejection fraction, CAD s/p CABG 10/2019, stomach cancer, bladder prolapse, diabetes mellitus, paroxysmal atrial fibrillation, and history of lower GI bleed who presented with abdominal distention for the 2 days' duration which has been worsening. On admission, patient was found to have RIC and was in acute on chronic  heart failure exacerbation in setting of atrial flutter. Patient was admitted to the ICU for acute renal failure and was downgraded on 5/19/2020.    #Acute cardiogenic shock in setting of atrial flutter s/p cardioversion 5/15/2020  - Echo 5/17/2020: EF 20-25%, severely decreased LV systolic function, moderate-severe MR, moderate AS, moderate TR, mild pulmonary hypertension, bilateral pleural effusions  - Pro-BNP on admission: 26,469  - Troponin: 0.23->0.23->0.25->0.21  - ECG on admission showed atrial flutter  - Recent outpatient echocardiogram showed EF 45-50% per patient  - EP consult appreciated  - Patient underwent cardioversion on 5/15/2020 after JAMILA showed no intra-cardiac clots  - S/p amiodarone infusion  - Continue with amiodarone 400mg PO Q12H for 7 days (Day #6)  - Will transition to amiodarone 200mg PO QD  - Continue with heparin drip; goal PTT 60-80  - [id number]: 5; HAS-BLED: 5  - Consider transitioning anticoagulation to Eliquis 2.5mg PO BID  - However, will need to speak with cardiology long-term need for anticoagulation given history of GI bleed  - Increase metoprolol tartrate to 25mg PO Q8H  - Heart failure consult appreciated  - Holding carvedilol, Valtessa, Ivabradine, and Entresto for now give RIC on HD  - Will need to discuss need for AICD with EP    #RIC on CKD stage 4 currently on hemodialysis  - Secondary to ATN vs AIN (eosinophils in urine) vs cardiorenal (EF severely reduced 45-50%->20-25%)  - Baseline creatinine: 0.9 in 10/2019  - Creatinine today: 5.7  - Patient is anuric and failed furosemide challenge (180mg)  - UA noted for eosinophils, suggestive of interstitial nephritis  - Patient was on ciprofloxacin outpatient for UTI  - Nephrology consult appreciated  - Discontinue sodium bicarbonate PO  - Monitor urine output  - FeNa >1%  - US abdomen: echogenic material around Castellon catheter in urinary bladder  - Patient to go for renal biopsy and Tesio catheter placement today by IR    #CAD s/p CABG 10/2019  - Continue with metoprolol 12.5mg PO Q6H  - Continue with aspirin 81mg PO QD  - Continue with atorvastatin 40mg PO QHS  - Increase metoprolol tartrate to 25mg PO Q8H    #UTI in setting of chronic Castellon catheter, resolved  - Urine Cx 5/11/2020: ALLEN  - Completed course of nafcillin    #Left-sided pleural effusion s/p thoracentesis x2  - Thoracentesis done on admission, which drained 300mL of transudative fluid  - Repeat chest X-ray showed re-accumulation of fluid  - Thoracentesis repeated on 5/14/2020, which drained 650mL  - Pulmonology consult appreciated: follow outpatient    #Chronic iron deficiency anemia with history of GI bleed 10/2019  - Per patient, outpatient colonoscopy revealed "rectal ulcer"  - Receives IV iron infusions every 2-3 months  - Follow outpatient with gastroenterology    #Diabetes mellitus type 2 with history of DKA in 10/2019  - Hemoglobin A1c 5/12/2020: 5.8  - Patient was hypoglycemic to 61 yesterday  - Change insulin glargine to 12 units SQ QHS  - Change insulin lispro to 4 units SQ TID  - Insulin sliding scale coverage  - Monitor fingerstick glucose    #History of stomach cancer  - Follow outpatient with oncology    #Bladder prolapse  - Continue with Castellon catheter    #Depression  - Continue with sertraline 25mg PO QD    #Misc  - DVT Prophylaxis: heparin drip  - GI Prophylaxis: pantoprazole 40mg PO QD   - Diet: NPO for procedure  - Activity: ambulate as tolerated  - IV Fluids: not indicated  - Code Status: Full Code    Dispo: pending renal biopsy and Tesio catheter placement today by IR GUS WILBURN 69y Female  MRN#: [id number]   Hospital Day: 10d    SUBJECTIVE  Patient is a 69y old Female who presents with a chief complaint of sob (21 May 2020 07:34)  Currently admitted to medicine with the primary diagnosis of CHF (congestive heart failure)    INTERVAL HPI AND OVERNIGHT EVENTS:  Patient was examined and seen at bedside. This morning she is resting comfortably in bed and reports no issues or overnight events.    REVIEW OF SYMPTOMS:  CONSTITUTIONAL: No weakness, fevers or chills; No headaches  EYES: No visual changes, eye pain, or discharge  ENT: No vertigo; No ear pain or change in hearing; No sore throat or difficulty swallowing  NECK: No pain or stiffness  RESPIRATORY: No cough, wheezing, or hemoptysis; No shortness of breath  CARDIOVASCULAR: No chest pain or palpitations  GASTROINTESTINAL: No abdominal or epigastric pain; No nausea, vomiting, or hematemesis; No diarrhea or constipation; No melena or hematochezia  GENITOURINARY: No dysuria, frequency or hematuria  MUSCULOSKELETAL: No joint pain, no muscle pain, no weakness  NEUROLOGICAL: No numbness or weakness  SKIN: No itching or rashes    OBJECTIVE  PAST MEDICAL & SURGICAL HISTORY  Heart failure  Female bladder prolapse  Diabetes  S/P CABG (coronary artery bypass graft)  History of repair of hip fracture    ALLERGIES:  No Known Allergies    MEDICATIONS:  STANDING MEDICATIONS  aMIOdarone Infusion 1 mG/Min IV Continuous <Continuous>  ascorbic acid 500 milliGRAM(s) Oral daily  aspirin  chewable 81 milliGRAM(s) Oral daily  atorvastatin 40 milliGRAM(s) Oral at bedtime  chlorhexidine 4% Liquid 1 Application(s) Topical <User Schedule>  heparin  Infusion 600 Unit(s)/Hr IV Continuous <Continuous>  insulin glargine Injectable (LANTUS) 12 Unit(s) SubCutaneous every morning  insulin lispro Injectable (HumaLOG) 4 Unit(s) SubCutaneous three times a day before meals  lactobacillus acidophilus 1 Tablet(s) Oral three times a day  melatonin 3 milliGRAM(s) Oral at bedtime  metoprolol tartrate 25 milliGRAM(s) Oral every 8 hours  pantoprazole    Tablet 40 milliGRAM(s) Oral before breakfast  sertraline 25 milliGRAM(s) Oral daily  tiotropium 18 MICROgram(s) Capsule 1 Capsule(s) Inhalation daily    PRN MEDICATIONS  acetaminophen   Tablet .. 650 milliGRAM(s) Oral every 6 hours PRN  aluminum hydroxide/magnesium hydroxide/simethicone Suspension 15 milliLiter(s) Oral once PRN  guaifenesin/dextromethorphan  Syrup 10 milliLiter(s) Oral every 6 hours PRN  loperamide 2 milliGRAM(s) Oral every 4 hours PRN      VITAL SIGNS: Last 24 Hours  T(C): 35.5 (21 May 2020 05:51), Max: 36.1 (20 May 2020 20:34)  T(F): 95.9 (21 May 2020 05:51), Max: 97 (20 May 2020 20:34)  HR: 73 (21 May 2020 05:51) (73 - 80)  BP: 126/65 (21 May 2020 05:51) (106/59 - 126/65)  BP(mean): --  RR: 18 (21 May 2020 05:51) (16 - 18)  SpO2: 97% (20 May 2020 19:51) (97% - 97%)    LABS:                        8.8    4.77  )-----------( 119      ( 21 May 2020 05:26 )             28.4     05-21    139  |  103  |  62<HH>  ----------------------------<  83  4.4   |  21  |  5.7<HH>    Ca    8.6      21 May 2020 05:26  Phos  5.4     05-21  Mg     2.2     05-21    TPro  5.1<L>  /  Alb  3.0<L>  /  TBili  0.4  /  DBili  x   /  AST  9   /  ALT  6   /  AlkPhos  51  05-21    PT/INR - ( 21 May 2020 05:26 )   PT: 11.80 sec;   INR: 1.03 ratio      PTT - ( 21 May 2020 05:26 )  PTT:34.4 sec      RADIOLOGY:  No interval imaging performed     PHYSICAL EXAM:  CONSTITUTIONAL: No acute distress, well-developed, well-groomed, AAOx3  HEAD: Atraumatic, normocephalic  EYES: EOM intact, PERRLA, conjunctiva and sclera clear  ENT: Supple, no masses, no thyromegaly, no bruits, no JVD; moist mucous membranes  PULMONARY: Clear to auscultation bilaterally; no wheezes, rales, or rhonchi  CARDIOVASCULAR: Regular rate and rhythm; no murmurs, rubs, or gallops  GASTROINTESTINAL: Soft, non-tender, non-distended; bowel sounds present  MUSCULOSKELETAL: 2+ peripheral pulses; no clubbing, no cyanosis, 2+ pitting edema in bilateral lower extremities  NEUROLOGY: non-focal  SKIN: No rashes or lesions; warm and dry    ASSESSMENT & PLAN  Patient is a 69yo female with PMH of heart failure with reduced ejection fraction, CAD s/p CABG 10/2019, stomach cancer, bladder prolapse, diabetes mellitus, paroxysmal atrial fibrillation, and history of lower GI bleed who presented with abdominal distention for the 2 days' duration which has been worsening. On admission, patient was found to have RIC and was in acute on chronic  heart failure exacerbation in setting of atrial flutter. Patient was admitted to the ICU for acute renal failure and was downgraded on 5/19/2020.    #Acute cardiogenic shock in setting of atrial flutter s/p cardioversion 5/15/2020  - Echo 5/17/2020: EF 20-25%, severely decreased LV systolic function, moderate-severe MR, moderate AS, moderate TR, mild pulmonary hypertension, bilateral pleural effusions  - Pro-BNP on admission: 26,469  - Troponin: 0.23->0.23->0.25->0.21  - ECG on admission showed atrial flutter  - Recent outpatient echocardiogram showed EF 45-50% per patient  - EP consult appreciated  - Patient underwent cardioversion on 5/15/2020 after JAMILA showed no intra-cardiac clots  - S/p amiodarone infusion  - Continue with amiodarone 400mg PO Q12H for 7 days (Day #6)  - Will transition to amiodarone 200mg PO QD  - Continue with heparin drip; goal PTT 60-80  - [id number]: 5; HAS-BLED: 5  - Consider transitioning anticoagulation to Eliquis 2.5mg PO BID  - However, will need to speak with cardiology long-term need for anticoagulation given history of GI bleed  - Increase metoprolol tartrate to 25mg PO Q8H  - Heart failure consult appreciated  - Holding carvedilol, Valtessa, Ivabradine, and Entresto for now give RIC on HD  - Will need to discuss need for AICD with EP    #RIC on CKD stage 4 currently on hemodialysis T/Th/S  - Secondary to ATN vs AIN (eosinophils in urine) vs cardiorenal (EF severely reduced 45-50%->20-25%)  - Baseline creatinine: 0.9 in 10/2019  - Creatinine today: 5.7  - Patient is anuric and failed furosemide challenge (180mg)  - UA noted for eosinophils, suggestive of interstitial nephritis  - Patient was on ciprofloxacin outpatient for UTI  - Nephrology consult appreciated  - Monitor urine output  - FeNa >1%  - US abdomen: echogenic material around Castellon catheter in urinary bladder  - Patient to go for renal biopsy and Tesio catheter placement today by IR  - Will likely go for hemodialysis today    #CAD s/p CABG 10/2019  - Continue with metoprolol 12.5mg PO Q6H  - Continue with aspirin 81mg PO QD  - Continue with atorvastatin 40mg PO QHS  - Increase metoprolol tartrate to 25mg PO Q8H    #UTI in setting of chronic Castellon catheter, resolved  - Urine Cx 5/11/2020: ALLEN  - Completed course of nafcillin    #Left-sided pleural effusion s/p thoracentesis x2  - Thoracentesis done on admission, which drained 300mL of transudative fluid  - Repeat chest X-ray showed re-accumulation of fluid  - Thoracentesis repeated on 5/14/2020, which drained 650mL  - Pulmonology consult appreciated: follow outpatient    #Chronic iron deficiency anemia with history of GI bleed 10/2019  - Per patient, outpatient colonoscopy revealed "rectal ulcer"  - Receives IV iron infusions every 2-3 months  - Follow outpatient with gastroenterology    #Diabetes mellitus type 2 with history of DKA in 10/2019  - Hemoglobin A1c 5/12/2020: 5.8  - Patient was hypoglycemic to 61 yesterday  - Change insulin glargine to 12 units SQ QHS  - Change insulin lispro to 4 units SQ TID  - Insulin sliding scale coverage  - Monitor fingerstick glucose    #History of stomach cancer  - Follow outpatient with oncology    #Bladder prolapse  - Continue with Castellon catheter    #Depression  - Continue with sertraline 25mg PO QD    #Misc  - DVT Prophylaxis: heparin drip  - GI Prophylaxis: pantoprazole 40mg PO QD   - Diet: NPO for procedure  - Activity: ambulate as tolerated  - IV Fluids: not indicated  - Code Status: Full Code    Dispo: pending renal biopsy and Tesio catheter placement today by IR GUS WILUBRN 69y Female  MRN#: [id number]   Hospital Day: 10d    SUBJECTIVE  Patient is a 69y old Female who presents with a chief complaint of sob (21 May 2020 07:34)  Currently admitted to medicine with the primary diagnosis of CHF (congestive heart failure)    INTERVAL HPI AND OVERNIGHT EVENTS:  Patient was examined and seen at bedside. This morning she is resting comfortably in bed and reports no issues or overnight events.    REVIEW OF SYMPTOMS:  CONSTITUTIONAL: No weakness, fevers or chills; No headaches  EYES: No visual changes, eye pain, or discharge  ENT: No vertigo; No ear pain or change in hearing; No sore throat or difficulty swallowing  NECK: No pain or stiffness  RESPIRATORY: No cough, wheezing, or hemoptysis; No shortness of breath  CARDIOVASCULAR: No chest pain or palpitations  GASTROINTESTINAL: No abdominal or epigastric pain; No nausea, vomiting, or hematemesis; No diarrhea or constipation; No melena or hematochezia  GENITOURINARY: No dysuria, frequency or hematuria  MUSCULOSKELETAL: No joint pain, no muscle pain, no weakness  NEUROLOGICAL: No numbness or weakness  SKIN: No itching or rashes    OBJECTIVE  PAST MEDICAL & SURGICAL HISTORY  Heart failure  Female bladder prolapse  Diabetes  S/P CABG (coronary artery bypass graft)  History of repair of hip fracture    ALLERGIES:  No Known Allergies    MEDICATIONS:  STANDING MEDICATIONS  aMIOdarone Infusion 1 mG/Min IV Continuous <Continuous>  ascorbic acid 500 milliGRAM(s) Oral daily  aspirin  chewable 81 milliGRAM(s) Oral daily  atorvastatin 40 milliGRAM(s) Oral at bedtime  chlorhexidine 4% Liquid 1 Application(s) Topical <User Schedule>  heparin  Infusion 600 Unit(s)/Hr IV Continuous <Continuous>  insulin glargine Injectable (LANTUS) 12 Unit(s) SubCutaneous every morning  insulin lispro Injectable (HumaLOG) 4 Unit(s) SubCutaneous three times a day before meals  lactobacillus acidophilus 1 Tablet(s) Oral three times a day  melatonin 3 milliGRAM(s) Oral at bedtime  metoprolol tartrate 25 milliGRAM(s) Oral every 8 hours  pantoprazole    Tablet 40 milliGRAM(s) Oral before breakfast  sertraline 25 milliGRAM(s) Oral daily  tiotropium 18 MICROgram(s) Capsule 1 Capsule(s) Inhalation daily    PRN MEDICATIONS  acetaminophen   Tablet .. 650 milliGRAM(s) Oral every 6 hours PRN  aluminum hydroxide/magnesium hydroxide/simethicone Suspension 15 milliLiter(s) Oral once PRN  guaifenesin/dextromethorphan  Syrup 10 milliLiter(s) Oral every 6 hours PRN  loperamide 2 milliGRAM(s) Oral every 4 hours PRN      VITAL SIGNS: Last 24 Hours  T(C): 35.5 (21 May 2020 05:51), Max: 36.1 (20 May 2020 20:34)  T(F): 95.9 (21 May 2020 05:51), Max: 97 (20 May 2020 20:34)  HR: 73 (21 May 2020 05:51) (73 - 80)  BP: 126/65 (21 May 2020 05:51) (106/59 - 126/65)  BP(mean): --  RR: 18 (21 May 2020 05:51) (16 - 18)  SpO2: 97% (20 May 2020 19:51) (97% - 97%)    LABS:                        8.8    4.77  )-----------( 119      ( 21 May 2020 05:26 )             28.4     05-21    139  |  103  |  62<HH>  ----------------------------<  83  4.4   |  21  |  5.7<HH>    Ca    8.6      21 May 2020 05:26  Phos  5.4     05-21  Mg     2.2     05-21    TPro  5.1<L>  /  Alb  3.0<L>  /  TBili  0.4  /  DBili  x   /  AST  9   /  ALT  6   /  AlkPhos  51  05-21    PT/INR - ( 21 May 2020 05:26 )   PT: 11.80 sec;   INR: 1.03 ratio      PTT - ( 21 May 2020 05:26 )  PTT:34.4 sec      RADIOLOGY:  No interval imaging performed     PHYSICAL EXAM:  CONSTITUTIONAL: No acute distress, well-developed, well-groomed, AAOx3  HEAD: Atraumatic, normocephalic  EYES: EOM intact, PERRLA, conjunctiva and sclera clear  ENT: Supple, no masses, no thyromegaly, no bruits, no JVD; moist mucous membranes  PULMONARY: Clear to auscultation bilaterally; no wheezes, rales, or rhonchi  CARDIOVASCULAR: Regular rate and rhythm; no murmurs, rubs, or gallops  GASTROINTESTINAL: Soft, non-tender, non-distended; bowel sounds present  MUSCULOSKELETAL: 2+ peripheral pulses; no clubbing, no cyanosis, 2+ pitting edema in bilateral lower extremities  NEUROLOGY: non-focal  SKIN: No rashes or lesions; warm and dry    ASSESSMENT & PLAN  Patient is a 67yo female with PMH of heart failure with reduced ejection fraction, CAD s/p CABG 10/2019, stomach cancer, bladder prolapse, diabetes mellitus, paroxysmal atrial fibrillation, and history of lower GI bleed who presented with abdominal distention for the 2 days' duration which has been worsening. On admission, patient was found to have RIC and was in acute on chronic  heart failure exacerbation in setting of atrial flutter. Patient was admitted to the ICU for acute renal failure and was downgraded on 5/19/2020.    #Acute cardiogenic shock - resolved   #Acute on chronic HFrEF exacerbation 2/2 rapid atrial flutter s/p cardioversion 5/15/2020  - Echo 5/17/2020: EF 20-25%, severely decreased LV systolic function, moderate-severe MR, moderate AS, moderate TR, mild pulmonary hypertension, bilateral pleural effusions  - Pro-BNP on admission: 26,469  - Troponin: 0.23->0.23->0.25->0.21  - ECG on admission showed atrial flutter  - Recent outpatient echocardiogram showed EF 45-50% per patient  - EP consult appreciated  - Patient underwent cardioversion on 5/15/2020 after JAMILA showed no intra-cardiac clots  - S/p amiodarone infusion  - Continue with amiodarone 400mg PO Q12H for 7 days (Day #6)  - Will transition to amiodarone 200mg PO QD  - Continue with heparin drip; goal PTT 60-80  - [id number]: 5; HAS-BLED: 5  - Consider transitioning anticoagulation to Eliquis 2.5mg PO BID  - However, will need to speak with cardiology long-term need for anticoagulation given history of GI bleed  - Increase metoprolol tartrate to 25mg PO Q8H  - Heart failure consult appreciated  - Holding carvedilol, Valtessa, Ivabradine, and Entresto for now give RIC on HD  - Will need to discuss need for AICD with EP    #RIC on CKD stage 4 currently on hemodialysis T/Th/S  - Secondary to ATN vs AIN (eosinophils in urine) vs cardiorenal (EF severely reduced 45-50%->20-25%)  - Baseline creatinine: 0.9 in 10/2019  - Creatinine today: 5.7  - Patient is anuric and failed furosemide challenge (180mg)  - UA noted for eosinophils, suggestive of interstitial nephritis  - Patient was on ciprofloxacin outpatient for UTI  - Nephrology consult appreciated  - Monitor urine output  - FeNa >1%  - US abdomen: echogenic material around Castellon catheter in urinary bladder  - Patient to go for renal biopsy and Tesio catheter placement today by IR  - Will likely go for hemodialysis today    #CAD s/p CABG 10/2019  - Continue with metoprolol 12.5mg PO Q6H  - Continue with aspirin 81mg PO QD  - Continue with atorvastatin 40mg PO QHS  - Increase metoprolol tartrate to 25mg PO Q8H    #UTI in setting of chronic Castellon catheter, resolved  - Urine Cx 5/11/2020: ALLEN  - Completed course of nafcillin    #Left-sided pleural effusion s/p thoracentesis x2  - Thoracentesis done on admission, which drained 300mL of transudative fluid  - Repeat chest X-ray showed re-accumulation of fluid  - Thoracentesis repeated on 5/14/2020, which drained 650mL  - Pulmonology consult appreciated: follow outpatient    #Chronic iron deficiency anemia with history of GI bleed 10/2019  - Per patient, outpatient colonoscopy revealed "rectal ulcer"  - Receives IV iron infusions every 2-3 months  - Follow outpatient with gastroenterology    #Diabetes mellitus type 2 with history of DKA in 10/2019  - Hemoglobin A1c 5/12/2020: 5.8  - Patient was hypoglycemic to 61 yesterday  - Change insulin glargine to 12 units SQ QHS  - Change insulin lispro to 4 units SQ TID  - Insulin sliding scale coverage  - Monitor fingerstick glucose    #History of stomach cancer  - Follow outpatient with oncology    #Bladder prolapse  - Continue with Castellon catheter    #Depression  - Continue with sertraline 25mg PO QD    #Misc  - DVT Prophylaxis: heparin drip  - GI Prophylaxis: pantoprazole 40mg PO QD   - Diet: NPO for procedure  - Activity: ambulate as tolerated  - IV Fluids: not indicated  - Code Status: Full Code    Dispo: pending renal biopsy and Tesio catheter placement today by IR

## 2020-05-21 NOTE — PROGRESS NOTE ADULT - ASSESSMENT
1. RIC. ATN (hypotension) vs AIN (+urine eosinophils) vs cardiorenal (EF 20-30%, new). May have had bladder outlet obstruction on admission, renal sonogram showed collapsed bladder and no hydronephrosis. Likely has underlying CKD given long standing proteinuria. Monitor I/Os. Daily BMP. Temporary HD catheter placed. HD TTS schedule. Kidney biopsy.  2. UTI/sepsis. Completed nafcillin.  3. Hypotension. Off pressor support now. If BP drops, add midodrine 10mg every 8 hours.

## 2020-05-21 NOTE — PROGRESS NOTE ADULT - SUBJECTIVE AND OBJECTIVE BOX
Vascular & Interventional Radiology Pre-Procedure Note    Procedure Name: tunnelled dialysis catheter and renal nontarget biopsy     HPI: HPI:  70 y/o female with pmh of DM, stomach ca, bladder prolapse, heart failure, s/p CABG 10/2019 presents complaining of abdominal distension for the past couple of days which has been worsening.  Patient states that her symptoms are similar to when she has urinary tract infections. She has had two other episodes of similar presenting symptoms since having an indwelling montejo catheter placed after her CABG (10/2019) as mentioned.  Patient was treated with cipro as outpt; reports decreased PO fluids since the symptoms began.  Patient denies fever/chills, abd pain, n/v/d, cough, chest pain, SOB. (12 May 2020 00:57)      Allergies:   Medications (Abx/Cardiac/Anticoagulation/Blood Products)    aMIOdarone    Tablet: 400 milliGRAM(s) Oral ( @ 05:33)  aspirin  chewable: 81 milliGRAM(s) Oral ( @ 11:54)  heparin  Infusion: 6 mL/Hr IV Continuous ( @ 09:30)  metoprolol tartrate: 12.5 milliGRAM(s) Oral ( @ 05:33)  metoprolol tartrate: 25 milliGRAM(s) Oral ( @ 05:47)    Data:  157.48  56.8  T(C): 35.5  HR: 73  BP: 126/65  RR: 18  SpO2: 97%    Exam  General: NAD, AAO x3, no distress  Chest: breathing comfortably on room air, CTAB  Abdomen: soft, non-tender, non- distended   Extremities: positive pulses bilaterally x4    Radiology & Additional Studies: Radiology imaging reviewed.     Labs:   -WBC 4.62 / HgB 9.4 / Hct 30.4 / Plt 114  -Na 138 / Cl 104 / BUN 58 / Glucose 97  -K 4.3 / CO2 21 / Cr 5.1  -ALT 6 / Alk Phos 52 / T.Bili 0.3  -INR1.03      EKG completed (date):     Height/Weight: Daily Height in cm: 157.48 (20 May 2020 22:47)    Daily Weight in k.7 (21 May 2020 05:51)    Plan:   -69y Female with indeterminate renal failure of unknown etiology requiring percutaneous nontarget biopsy of the kidneys.   -Tunnelled dialysis catheter necessary for more durable dialysis access.   -Plan for both procedures in IR today.   -NPO

## 2020-05-22 NOTE — DISCHARGE NOTE PROVIDER - PROVIDER TOKENS
PROVIDER:[TOKEN:[[id number]],FOLLOWUP:[1-3 days]],PROVIDER:[TOKEN:[[id number]],FOLLOWUP:[1 week]],PROVIDER:[TOKEN:[[id number]],FOLLOWUP:[1-3 days]]

## 2020-05-22 NOTE — DISCHARGE NOTE PROVIDER - NSDCHHENCOUNTER_GEN_ALL_CORE
EMERGENCY DEPARTMENT HISTORY AND PHYSICAL EXAM 
 
 
Date: 2019 Patient Name: Bk Delarosa History of Presenting Illness Chief Complaint Patient presents with  Vomiting Ambulatory into the ED with c/o vomiting all night. 37 weeks pregnant; denies complications. C/o decreased fetal movement, abdominal cramping and lower back pain. History Provided By: Patient and Patient's  HPI: Bk Delarosa is a 35 y/o  with pregnancy at 37w5d by first trimester ultrasound c/o 48 hours of constant and progressive lower back pain currently rating at 10/10 intensity. Pt reports no relief with Tylenol. Pt also reports several episodes of nonbloody, nonbilious emesis onset the past 24 hours. Patient states that she is unable to keep anything down. She denies any abdominal contractions, loss of mucous plug, leakage of fluid or vaginal discharge. She reports decreased fetal movement during thsi period. . Denies dysuria, urgency or frequency. There are no other complaints, changes, or physical findings at this time. Past History Past Medical History: 
Past Medical History:  
Diagnosis Date  Asthma Past Surgical History: 
Past Surgical History:  
Procedure Laterality Date  HX ACL RECONSTRUCTION Left 2012  HX ORTHOPAEDIC  2012  
 left ACL repaired Family History: 
Family History Problem Relation Age of Onset  No Known Problems Mother  Cancer Father  No Known Problems Sister  No Known Problems Brother  Cancer Maternal Grandmother  Cancer Maternal Grandfather  Cancer Paternal Grandmother  Cancer Paternal Grandfather Social History: 
Social History Tobacco Use  Smoking status: Never Smoker  Smokeless tobacco: Never Used Substance Use Topics  Alcohol use: No  
 Drug use: No  
 
 
Allergies: Allergies Allergen Reactions  Latex Other (comments) Skin irritation  Amoxicillin Itching  Pcn [Penicillins] Hives  Shellfish Derived Itching Review of Systems Review of Systems Constitutional: Negative. Negative for chills and fever. HENT: Negative. Negative for congestion, facial swelling, rhinorrhea, sore throat, trouble swallowing and voice change. Eyes: Negative. Respiratory: Negative. Negative for apnea, cough, chest tightness, shortness of breath and wheezing. Cardiovascular: Negative. Negative for chest pain, palpitations and leg swelling. Gastrointestinal: Positive for abdominal pain and vomiting. Negative for abdominal distention, blood in stool, constipation, diarrhea and nausea. Endocrine: Negative. Negative for cold intolerance, heat intolerance and polyuria. Genitourinary: Negative. Negative for difficulty urinating, dysuria, flank pain, frequency, hematuria and urgency. Musculoskeletal: Negative. Negative for arthralgias, back pain, myalgias, neck pain and neck stiffness. Skin: Negative. Negative for color change and rash. Neurological: Negative. Negative for dizziness, syncope, facial asymmetry, speech difficulty, weakness, light-headedness, numbness and headaches. Hematological: Negative. Does not bruise/bleed easily. Psychiatric/Behavioral: Negative. Negative for confusion and self-injury. The patient is not nervous/anxious. Physical Exam  
Physical Exam  
Constitutional: She is oriented to person, place, and time. She appears well-developed and well-nourished. No distress. HENT:  
Head: Normocephalic and atraumatic. Mouth/Throat: Oropharynx is clear and moist. No oropharyngeal exudate. Eyes: Pupils are equal, round, and reactive to light. Conjunctivae and EOM are normal.  
Neck: Normal range of motion. Cardiovascular: Normal rate, regular rhythm and normal heart sounds. Exam reveals no gallop and no friction rub. No murmur heard.  
Pulmonary/Chest: Effort normal and breath sounds normal. No respiratory distress. She has no wheezes. She has no rales. She exhibits no tenderness. Abdominal: Soft. Bowel sounds are normal. She exhibits no distension and no mass. There is no tenderness. There is no rebound and no guarding. Gravid abdomen Musculoskeletal: Normal range of motion. She exhibits no edema, tenderness or deformity. Neurological: She is alert and oriented to person, place, and time. She displays normal reflexes. No cranial nerve deficit. She exhibits normal muscle tone. Coordination normal.  
Skin: Skin is warm. No rash noted. She is not diaphoretic. Psychiatric: She has a normal mood and affect. Nursing note and vitals reviewed. Diagnostic Study Results Labs - No results found for this or any previous visit (from the past 12 hour(s)). Radiologic Studies - No orders to display CT Results  (Last 48 hours) None CXR Results  (Last 48 hours) None Medical Decision Making I am the first provider for this patient. I reviewed the vital signs, available nursing notes, past medical history, past surgical history, family history and social history. Vital Signs-Reviewed the patient's vital signs. Patient Vitals for the past 12 hrs: 
 Temp Pulse Resp BP SpO2  
05/18/19 1008 99.7 °F (37.6 °C) (!) 110 20 127/90 99 % 05/18/19 0949 99 °F (37.2 °C) 94 18 128/80 100 % Pulse Oximetry Analysis - 100% on RA Cardiac Monitor:  
Rate: 94 bpm 
Rhythm: Normal Sinus Rhythm Records Reviewed: Nursing Notes and Old Medical Records Provider Notes (Medical Decision Making): DDX: 
Active labor, false labor, uti, round ligament pain, dehydration, abdominal pain in pregnancy Plan: 
Send to L&D for further evaluation. Impression: Abdominal pain in pregnancy, third trimester ED Course:  
Initial assessment performed.  The patients presenting problems have been discussed, and they are in agreement with the care plan formulated and outlined with them. I have encouraged them to ask questions as they arise throughout their visit. 9:50 AM 
Pt without evidence of acute, non-obstetric emergency at this time, will send to L&D for further evaluation and work up. Should pt have continued sx but found to be without active labor, recommend to return to ED. Disposition: Transfer to L&D PLAN: 
1. Admit to L&D. Admit Note: 
9:50 AM 
Pt is being transported to L&D for further workup. The reasons for their admission have been discussed with them and/or available family. They convey agreement and understanding for the need to be admitted and for their admission diagnosis. Consultation has been made with the inpatient physician specialist for hospitalization. Diagnosis Clinical Impression: Abdominal Pain in Pregnancy, third trimester ICD-10-CM ICD-9-CM 1. 37 weeks gestation of pregnancy Z3A.37 V22.2 2. Acute vomiting R11.10 787.03  
3. Decreased fetal movements in third trimester, fetus 1 of multiple gestation O36.8131 655.73  
4. Abdominal cramping affecting pregnancy O26.899 646.80  
 R10.9 789.00 Attestations: 
I personally performed the services described in this documentation on this date 5/18/2019 for [employer]. I have reviewed and verified that all the information is accurate and complete. Erasmo Bravo MD 
 
 
This note will not be viewable in [address] 22-May-2020 26-May-2020 28-May-2020

## 2020-05-22 NOTE — PROGRESS NOTE ADULT - SUBJECTIVE AND OBJECTIVE BOX
[facility] FOLLOW UP NOTE  --------------------------------------------------------------------------------  24 hour events/subjective: Patient examined. Appears comfortable.    PAST HISTORY  --------------------------------------------------------------------------------  No significant changes to PMH, PSH, FHx, SHx, unless otherwise noted    ALLERGIES & MEDICATIONS  --------------------------------------------------------------------------------  Allergies    No Known Allergies    Intolerances      Standing Inpatient Medications  aMIOdarone    Tablet 200 milliGRAM(s) Oral daily  ascorbic acid 500 milliGRAM(s) Oral daily  aspirin  chewable 81 milliGRAM(s) Oral daily  atorvastatin 40 milliGRAM(s) Oral at bedtime  chlorhexidine 4% Liquid 1 Application(s) Topical <User Schedule>  insulin glargine Injectable (LANTUS) 9 Unit(s) SubCutaneous every morning  insulin lispro Injectable (HumaLOG) 3 Unit(s) SubCutaneous three times a day before meals  lactobacillus acidophilus 1 Tablet(s) Oral three times a day  melatonin 3 milliGRAM(s) Oral at bedtime  metoprolol tartrate 25 milliGRAM(s) Oral every 8 hours  pantoprazole    Tablet 40 milliGRAM(s) Oral before breakfast  sertraline 25 milliGRAM(s) Oral daily  tiotropium 18 MICROgram(s) Capsule 1 Capsule(s) Inhalation daily    PRN Inpatient Medications  acetaminophen   Tablet .. 650 milliGRAM(s) Oral every 6 hours PRN  aluminum hydroxide/magnesium hydroxide/simethicone Suspension 15 milliLiter(s) Oral once PRN  guaifenesin/dextromethorphan  Syrup 10 milliLiter(s) Oral every 6 hours PRN  loperamide 2 milliGRAM(s) Oral every 4 hours PRN      VITALS/PHYSICAL EXAM  --------------------------------------------------------------------------------  T(C): 35.6 (05-22-20 @ 05:05), Max: 35.7 (05-21-20 @ 12:52)  HR: 77 (05-22-20 @ 05:05) (70 - 77)  BP: 97/57 (05-22-20 @ 05:05) (90/57 - 126/64)  RR: 18 (05-22-20 @ 05:05) (18 - 18)  SpO2: --  Wt(kg): --  Height (cm): 157.48 (05-20-20 @ 22:47)  Weight (kg): 56.8 (05-20-20 @ 22:47)  BMI (kg/m2): 22.9 (05-20-20 @ 22:47)  BSA (m2): 1.57 (05-20-20 @ 22:47)      05-21-20 @ 07:01  -  05-22-20 @ 07:00  --------------------------------------------------------  IN: 0 mL / OUT: 75 mL / NET: -75 mL    05-22-20 @ 07:01  -  05-22-20 @ 12:15  --------------------------------------------------------  IN: 240 mL / OUT: 0 mL / NET: 240 mL      Physical Exam:  	Gen: NAD  	Pulm: CTA B/L  	CV: RRR, S1S2  	Abd: +BS, soft, nontender/nondistended  	: No suprapubic tenderness  	LE: Warm, FROM, no clubbing, intact strength; no edema  	Vascular access:    LABS/STUDIES  --------------------------------------------------------------------------------              8.8    4.77  >-----------<  119      [05-21-20 @ 05:26]              28.4     139  |  103  |  62  ----------------------------<  83      [05-21-20 @ 05:26]  4.4   |  21  |  5.7        Ca     8.6     [05-21-20 @ 05:26]      Mg     2.2     [05-21-20 @ 05:26]      Phos  5.4     [05-21-20 @ 05:26]    TPro  5.1  /  Alb  3.0  /  TBili  0.4  /  DBili  x   /  AST  9   /  ALT  6   /  AlkPhos  51  [05-21-20 @ 05:26]    PT/INR: PT 11.80, INR 1.03       [05-21-20 @ 05:26]  PTT: 34.4       [05-21-20 @ 05:26]      Creatinine Trend:  SCr 5.7 [05-21 @ 05:26]  SCr 5.1 [05-20 @ 06:00]  SCr 5.8 [05-19 @ 04:50]  SCr 5.3 [05-18 @ 05:15]  SCr 4.8 [05-17 @ 16:00]    Urinalysis - [05-11-20 @ 22:30]      Color Yellow / Appearance Turbid / SG 1.005 / pH 7.0      Gluc Negative / Ketone Negative  / Bili Negative / Urobili <2 mg/dL       Blood Moderate / Protein 100 mg/dL / Leuk Est Large / Nitrite Negative      RBC 3 / WBC >720 / Hyaline 8 / Gran  / Sq Epi  / Non Sq Epi 3 / Bacteria Moderate      Iron 24, TIBC 204, %sat 12      [08-30-19 @ 07:16]  Ferritin 401      [05-19-20 @ 06:25]  HbA1c 5.7      [11-09-19 @ 01:20]  TSH 0.58      [08-02-19 @ 18:37]  Lipid: chol 153, , HDL 45, LDL 75      [08-30-19 @ 07:16]    HBsAb Nonreact      [05-19-20 @ 14:58]  HBsAg Nonreact      [05-19-20 @ 14:58]  HCV 0.33, Nonreact      [05-19-20 @ 14:58]    Free Light Chains: kappa 6.76, lambda 3.58, ratio = 1.89      [05-19 @ 04:50]  Immunofixation Serum:   No Monoclonal Band Identified    Reference Range: None Detected      [05-19-20 @ 04:50]  SPEP Interpretation: Hypogammaglobulinemia      [05-19-20 @ 04:50]

## 2020-05-22 NOTE — DISCHARGE NOTE PROVIDER - NSDCFUADDAPPT_GEN_ALL_CORE_FT
You will start outpatient HD at [facility]: [address] [place] [address] on Friday at 9am.  Please follow up with Dr. Gandara, 20.  Please review hospitalization with PCP.     Instructions for sliding scale Admelo unit before meal  if Glu 151-200  4 unit before meal if Glu 201-250  6 unit before meal  if Glu 251-300  8 unit before meal  if Glu 301-350  10 unit before meal if Glu 351-400  12 unit before meal if Glu >400 and call MD You will start outpatient HD at [facility]: [address] [place] [address] on Friday at 9am.    Please follow up with Dr. Gandara, 20.    Please review hospitalization with PCP.     Instructions for sliding scale Admelo unit before meal  if Glu 151-200  4 unit before meal if Glu 201-250  6 unit before meal  if Glu 251-300  8 unit before meal  if Glu 301-350  10 unit before meal if Glu 351-400  12 unit before meal if Glu >400 and call MD

## 2020-05-22 NOTE — DISCHARGE NOTE PROVIDER - NSDCCPCAREPLAN_GEN_ALL_CORE_FT
PRINCIPAL DISCHARGE DIAGNOSIS  Diagnosis: CHF (congestive heart failure)  Assessment and Plan of Treatment: You were admitted for heart failure exacerbation, which was likely triggered by an irregularly fast and irregular heart rhythm. You were seen by several specialists and underwent a cardioversion to revert the heart rhythm back to regular. You will need to continue on the medications prescribed to continue on your medications prescribed and follow up with your providers to continue medical management.      SECONDARY DISCHARGE DIAGNOSES  Diagnosis: Atrial fibrillation/flutter  Assessment and Plan of Treatment: You were found to have irregularly fast and irregular heart rhythm. You were seen by several specialists and underwent a cardioversion to revert the heart rhythm back to regular. You will need to eventually be on anticoagulation, however, this was not started due to your anemia and subsequent bleed after the kidney biopsy    Diagnosis: ATN (acute tubular necrosis)  Assessment and Plan of Treatment: qu PRINCIPAL DISCHARGE DIAGNOSIS  Diagnosis: CHF (congestive heart failure)  Assessment and Plan of Treatment: You were admitted for heart failure exacerbation, which was likely triggered by an irregularly fast and irregular heart rhythm. You were seen by several specialists and underwent a cardioversion to revert the heart rhythm back to regular. You will need to continue on the medications prescribed to continue on your medications prescribed and follow up with your providers to continue medical management.      SECONDARY DISCHARGE DIAGNOSES  Diagnosis: Atrial fibrillation/flutter  Assessment and Plan of Treatment: You were found to have irregularly fast and irregular heart rhythm. You were seen by several specialists and underwent a cardioversion to revert the heart rhythm back to regular. You will need to eventually be on anticoagulation, however, this was not started due to your anemia and subsequent bleed after the kidney biopsy. Please follow up with your providers to discuss reinitiation.    Diagnosis: ATN (acute tubular necrosis)  Assessment and Plan of Treatment: Acute tubular necrosis (ATN) is a medical condition involving the death of tubular epithelial cells that form the renal tubules of the kidneys. This caused you to require dialysis. You had a kidney biopsy, and subsequently developed a small hematoma which causes anemia and requirement for blood transfusion which you tolerated well. You will need to follow up with nephrology for evaluation of your kidney function and needs for dialysis.

## 2020-05-22 NOTE — PROGRESS NOTE ADULT - ASSESSMENT
1. RIC. ATN (hypotension) vs AIN (+urine eosinophils) vs cardiorenal (EF 20-30%, new). May have had bladder outlet obstruction on admission, renal sonogram showed collapsed bladder and no hydronephrosis. Likely has underlying CKD given long standing proteinuria. Monitor I/Os. Daily BMP. Tunneled HD catheter placed. HD MWF schedule. Will f/u kidney biopsy.  2. UTI/sepsis. Completed nafcillin.  3. Hypotension. Off pressor support now.

## 2020-05-22 NOTE — PROGRESS NOTE ADULT - SUBJECTIVE AND OBJECTIVE BOX
Interventional Radiology Follow- Up Note    69y Female s/p tunneled catheter placement and Rrenal bx on 5/21 in Interventional Radiology with Dr Castro        S - Complaining with some discomfort at the tunneled catheter side. No SOB, abdominal or back pain    Vitals: T(F): 96.1 (05-22-20 @ 05:05), Max: 96.2 (05-21-20 @ 12:52)  HR: 77 (05-22-20 @ 05:05) (70 - 77)  BP: 97/57 (05-22-20 @ 05:05) (90/57 - 126/64)  RR: 18 (05-22-20 @ 05:05) (18 - 18)    LABS:                        8.8    4.77  )-----------( 119      ( 21 May 2020 05:26 )             28.4     PHYSICAL EXAM:  General: NAD  Back: no erythema or hematoma  Abd: soft, non tender    A/P - 69 F s/p tunneled catheter placement and R renal bx on 5/21 in Interventional Radiology with Dr Castro  1. S/p catheter/R renal bx - Doing well clinically, okay to shower.     Please call Interventional Radiology [phone] with any questions, concerns, or issues regarding above.

## 2020-05-22 NOTE — DISCHARGE NOTE PROVIDER - NSDCMRMEDTOKEN_GEN_ALL_CORE_FT
acetaminophen 325 mg oral tablet: 2 tab(s) orally every 6 hours, As needed, Mild Pain (1 - 3)  ascorbic acid 500 mg oral tablet: 1 tab(s) orally once a day  aspirin 81 mg oral tablet, chewable: 1 tab(s) orally once a day  atorvastatin 40 mg oral tablet: 1 tab(s) orally once a day (at bedtime)  carvedilol 3.125 mg oral tablet: 3 tab(s) orally every 12 hours  Eliquis 2.5 mg oral tablet: 1 tab(s) orally 2 times a day   furosemide 20 mg oral tablet: 1 tab(s) orally once a day  heparin: 5000 unit(s) subcutaneous every 8 hours  hydrALAZINE 10 mg oral tablet: 1 tab(s) orally every 8 hours  isosorbide dinitrate 5 mg oral tablet: 1 tab(s) orally 3 times a day  lactobacillus acidophilus oral capsule: 1 tab(s) orally 3 times a day  megestrol 40 mg/mL oral suspension: 20 milliliter(s) orally once a day  metFORMIN 1000 mg oral tablet: 1 tab(s) orally every 12 hours  nystatin 100,000 units/g topical cream: 1 application topically 2 times a day  pantoprazole 40 mg oral delayed release tablet: 1 tab(s) orally once a day (before a meal)  sacubitril-valsartan 24 mg-26 mg oral tablet: 1 tab(s) orally 2 times a day  sertraline 25 mg oral tablet: 1 tab(s) orally once a day  tiotropium 18 mcg inhalation capsule: 1 cap(s) inhaled once a day Admelog SoloStar 100 units/mL injectable solution: 2 unit before meal  if Glu 151-200  4 unit before meal if Glu 201-250  6 unit before meal  if Glu 251-300  8 unit before meal  if Glu 301-350  10 unit before meal if Glu 351-400  12 unit before meal if Glu &gt;400 and call MD  aluminum hydroxide-magnesium hydroxide 200 mg-200 mg/5 mL oral suspension: 15 milliliter(s) orally 2 times a day, As Needed -Dyspepsia   amiodarone 200 mg oral tablet: 1 tab(s) orally once a day  ascorbic acid 500 mg oral tablet: 1 tab(s) orally once a day  aspirin 81 mg oral tablet, chewable: 1 tab(s) orally once a day  atorvastatin 40 mg oral tablet: 1 tab(s) orally once a day (at bedtime)  hydrALAZINE 25 mg oral tablet: 1 tab(s) orally 3 times a day, hold if SBP &lt;100  isosorbide dinitrate 10 mg oral tablet: 1 tab(s) orally 3 times a day hold if SBP&lt;100  lactobacillus acidophilus oral capsule: 1 tab(s) orally 3 times a day  Lantus Solostar Pen 100 units/mL subcutaneous solution: 5 unit(s) subcutaneous once a day (at bedtime); hold if glucose &lt;100   loperamide 2 mg oral capsule: 1 cap(s) orally every 4 hours, As needed, Diarrhea  melatonin 3 mg oral tablet: 1 tab(s) orally once a day (at bedtime)  metoprolol succinate 50 mg oral tablet, extended release: 1 tab(s) orally 2 times a day; hold if HR &lt;60 or SBP &lt;90  pantoprazole 40 mg oral delayed release tablet: 1 tab(s) orally once a day (before a meal)  sertraline 25 mg oral tablet: 1 tab(s) orally once a day  tiotropium 18 mcg inhalation capsule: 1 cap(s) inhaled once a day  torsemide 20 mg oral tablet: 1 tab(s) orally 2 times a day Admelog SoloStar 100 units/mL injectable solution: please see written insructions in discharge summary   aluminum hydroxide-magnesium hydroxide 200 mg-200 mg/5 mL oral suspension: 15 milliliter(s) orally 2 times a day, As Needed -Dyspepsia   amiodarone 200 mg oral tablet: 1 tab(s) orally once a day  ascorbic acid 500 mg oral tablet: 1 tab(s) orally once a day  aspirin 81 mg oral tablet, chewable: 1 tab(s) orally once a day  atorvastatin 40 mg oral tablet: 1 tab(s) orally once a day (at bedtime)  hydrALAZINE 25 mg oral tablet: 1 tab(s) orally 3 times a day, hold if SBP &lt;100  isosorbide dinitrate 10 mg oral tablet: 1 tab(s) orally 3 times a day hold if SBP&lt;100  lactobacillus acidophilus oral capsule: 1 tab(s) orally 3 times a day  Lantus Solostar Pen 100 units/mL subcutaneous solution: 5 unit(s) subcutaneous once a day (at bedtime); hold if glucose &lt;100   loperamide 2 mg oral capsule: 1 cap(s) orally every 4 hours, As needed, Diarrhea  melatonin 3 mg oral tablet: 1 tab(s) orally once a day (at bedtime)  metoprolol succinate 50 mg oral tablet, extended release: 1 tab(s) orally 2 times a day; hold if HR &lt;60 or SBP &lt;90  pantoprazole 40 mg oral delayed release tablet: 1 tab(s) orally once a day (before a meal)  sertraline 25 mg oral tablet: 1 tab(s) orally once a day  tiotropium 18 mcg inhalation capsule: 1 cap(s) inhaled once a day  torsemide 20 mg oral tablet: 1 tab(s) orally 2 times a day

## 2020-05-22 NOTE — PROGRESS NOTE ADULT - SUBJECTIVE AND OBJECTIVE BOX
Interval history: S/p kidney biopsy and vascular access yesterday.      HPI:    68 y/o F with h/o DM, CAD s/p CABG, AF/flutter not on A/C due to GI bleeding pending work up (NUBIA clipped during CABG), ICM, chronic systolic heart failure, with LVEF 20% > 45% > 25% now, admitted from from with complaint of fatigue, low blood pressure. Patient was found to be hypotensive to the 70s upon arrive for which she was started on pressors and admitted to ICU. Her mental status has remained intact. Creat was  4.9 with BUN 99. Upon admission patient was in atrial flutter with rate 130-140s. An echocardiogram showed drop in LV systolic function to ~ 30-35%.       PMH:  DM, stomach ca, bladder prolapse, heart failure    PSH: CABG     Social: Denies smoking or ETOH       Physical exam     General: AAO x3, no acute distress   Eyes: Clear eyes   Neck: Trachea is central, JVD+   Lungs: Decreased RS on left side  Heart: Regular heart sounds, + apical murmurs appreciated.    GI: Abdomen is soft, NT  Neuro: Normal strength  Psych: Calm affect

## 2020-05-22 NOTE — DISCHARGE NOTE PROVIDER - CARE PROVIDER_API CALL
Yogesh Barbosa  MEDICINE  [address] [place] [address]  Phone: [phone]  Fax: [phone]  Follow Up Time: 1-3 days    Santy Simmons (MD; MD)  Cardiovascular Disease; Internal Medicine  [address] [place] [address] [address] [place] [address]  Phone: [phone]  Fax: [phone]  Follow Up Time: 1 week    Liza Peguero  INTERNAL MEDICINE  [address] [place] [address]  Phone: [phone]  Fax: [phone]  Follow Up Time: 1-3 days

## 2020-05-22 NOTE — CHART NOTE - NSCHARTNOTEFT_GEN_A_CORE
Called by RN because patient is hypotensive. Patient returned from HD and was found to have a systolic pressure in the 60's. In view of her low EF, will give a modest bolus of 250 over 30 minutes. Will monitor closely. Called by RN because patient is hypotensive. Patient returned from HD and was found to have a systolic pressure in the 60's. In view of her low EF, will give a modest bolus of 250 over 30 minutes. Patient was also desaturating to the low 80's, placed on 2L NC, with improvement. Will monitor closely.

## 2020-05-22 NOTE — DISCHARGE NOTE PROVIDER - HOSPITAL COURSE
Patient is a 69yo female with PMH of heart failure with reduced ejection fraction, CAD s/p CABG 10/2019, stomach cancer, bladder prolapse, diabetes mellitus, paroxysmal atrial fibrillation, and history of lower GI bleed who presented with abdominal distention for the 2 days' duration which has been worsening. On admission, patient was found to have RIC and was in acute on chronic  heart failure exacerbation in setting of atrial flutter. Patient was admitted to the ICU for acute renal failure downgraded 5/19/20, s/p CVVHD 5/15, IR guided bx and tessio placement 5/21 complicated  by renal hematoma and hematuria.        # Right kidney hematoma s/p renal biopsy    - s/p 1 PRBC 5/24 -  vitals remain stable, Hgb 7.2 - will give 1 unit slowly     - will continue aspirin    - repeat CT to eval hematoma - stable in size    - resolved hematuria    - per urology 5/26 - ok to restart AC if pt needs, and observe; however, per cardio Dr Gandara, since atrial appendage was clipped during CABG, risk of bleed outweighs risk of forming a clot at this time - will defer AC restart after dc        #Acute cardiogenic shock, resolved    #Acute on chronic heart failure exacerbation with reduced ejection fraction secondary to rapid atrial flutter s/p cardioversion 5/15/2020    - Echo 5/17/2020: EF 20-25%, severely decreased LV systolic function, moderate-severe MR, moderate AS, moderate TR, mild pulmonary hypertension, bilateral pleural effusions    - Pro-BNP on admission: 26,469    - Troponin: 0.23->0.23->0.25->0.21    - ECG on admission showed atrial flutter    - Recent outpatient echocardiogram showed EF 45-50% per patient    - EP consult appreciated    - Patient underwent cardioversion on 5/15/2020 after JAMILA showed no intra-cardiac clots    - S/p amiodarone infusion    - Continue with amiodarone 200mg PO QD    - [id number]: 5; HAS-BLED: 5    - Patient was given warfarin 5mg on 5/22/2020    - AC was held in light on hematoma/hematuria - however, per cardio Dr Gandara, since atrial appendage was clipped during CABG, risk of bleed outweighs risk of forming a clot at this time - will defer AC restart after dc on follow up    - Heart failure consult appreciated     - Started hydralazine, torsemide, isordil, metoprolol XL 5/26/20 - tolerating well on DC    - Patient refusing LifeVest        #Acute on chronic anemia with history of GI bleed 10/2019    - Per patient, outpatient colonoscopy revealed "rectal ulcer"    - Receives IV iron infusions every 2-3 months    - Baseline hemoglobin: 9    - Hemoglobin today: 7.2; will give 1 unit slowly then dc     - Patient was given warfarin 5mg on 5/22/2020    - Maintain 2 peripheral 18-gauge IVs    - Keep active type and screen    - Monitor hemoglobin/hematocrit     - will give 100mg iron sucrose during dialysis session         #RIC on CKD stage 4 currently on hemodialysis MWF    - Secondary to ATN per pathology     - Baseline creatinine: 0.9 in 10/2019    - Creatinine today: 3.7    - UA noted for eosinophils, suggestive of interstitial nephritis    - Patient was on ciprofloxacin outpatient for UTI    - Nephrology consult appreciated    - Monitor urine output    - FeNa >1%    - US abdomen: echogenic material around Castellon catheter in urinary bladder    - S/p renal biopsy and Tesio catheter placement by IR 5/21/2020 - prelim bx showing ATN    - OP dialysis set up for [facility] Friday 5/29        #CAD s/p CABG 10/2019    - Continue with aspirin 81mg PO QD    - Continue with atorvastatin 40mg PO QHS    - c/w metoprolol        #UTI in setting of chronic Castellon catheter, resolved    - Urine Cx 5/11/2020: ALLEN    - Completed course of nafcillin        #Left-sided pleural effusion s/p thoracentesis x2, improving    - Thoracentesis done on admission, which drained 300mL of transudative fluid    - Thoracentesis repeated on 5/14/2020, which drained 650mL    - Pulmonology consult appreciated: follow outpatient        Patient hemodynamically stable and appropriate for discharge with outpatient follow up and outpatient dialysis. She is aware of the plan and agreeable to discharge.

## 2020-05-22 NOTE — DISCHARGE NOTE PROVIDER - CARE PROVIDERS DIRECT ADDRESSES
,DirectAddress_Unknown,[email or web address],DirectAddress_Unknown

## 2020-05-22 NOTE — PROGRESS NOTE ADULT - SUBJECTIVE AND OBJECTIVE BOX
GUS WILBURN 69y Female  MRN#: [id number]   Hospital Day: 11d    SUBJECTIVE  Patient is a 69y old Female who presents with a chief complaint of sob (22 May 2020 09:48)  Currently admitted to medicine with the primary diagnosis of CHF (congestive heart failure)    INTERVAL HPI AND OVERNIGHT EVENTS:  Patient was examined and seen at bedside. This morning she is resting comfortably in bed and reports no issues or overnight events. Patient had Tesio catheter placed in right subclavian and had renal biopsy done as well.    REVIEW OF SYMPTOMS:  CONSTITUTIONAL: No weakness, fevers or chills; No headaches  EYES: No visual changes, eye pain, or discharge  ENT: No vertigo; No ear pain or change in hearing; No sore throat or difficulty swallowing  NECK: No pain or stiffness  RESPIRATORY: No cough, wheezing, or hemoptysis; No shortness of breath  CARDIOVASCULAR: No chest pain or palpitations  GASTROINTESTINAL: No abdominal or epigastric pain; No nausea, vomiting, or hematemesis; No diarrhea or constipation; No melena or hematochezia  GENITOURINARY: No dysuria, frequency or hematuria  MUSCULOSKELETAL: No joint pain, flank tenderness at biopsy site, no weakness  NEUROLOGICAL: No numbness or weakness  SKIN: No itching or rashes    OBJECTIVE  PAST MEDICAL & SURGICAL HISTORY  Heart failure  Female bladder prolapse  Diabetes  S/P CABG (coronary artery bypass graft)  History of repair of hip fracture    ALLERGIES:  No Known Allergies    MEDICATIONS:  STANDING MEDICATIONS  aMIOdarone Infusion 1 mG/Min IV Continuous <Continuous>  ascorbic acid 500 milliGRAM(s) Oral daily  aspirin  chewable 81 milliGRAM(s) Oral daily  atorvastatin 40 milliGRAM(s) Oral at bedtime  chlorhexidine 4% Liquid 1 Application(s) Topical <User Schedule>  insulin glargine Injectable (LANTUS) 12 Unit(s) SubCutaneous every morning  insulin lispro Injectable (HumaLOG) 4 Unit(s) SubCutaneous three times a day before meals  lactobacillus acidophilus 1 Tablet(s) Oral three times a day  melatonin 3 milliGRAM(s) Oral at bedtime  metoprolol tartrate 25 milliGRAM(s) Oral every 8 hours  pantoprazole    Tablet 40 milliGRAM(s) Oral before breakfast  sertraline 25 milliGRAM(s) Oral daily  tiotropium 18 MICROgram(s) Capsule 1 Capsule(s) Inhalation daily    PRN MEDICATIONS  acetaminophen   Tablet .. 650 milliGRAM(s) Oral every 6 hours PRN  aluminum hydroxide/magnesium hydroxide/simethicone Suspension 15 milliLiter(s) Oral once PRN  guaifenesin/dextromethorphan  Syrup 10 milliLiter(s) Oral every 6 hours PRN  loperamide 2 milliGRAM(s) Oral every 4 hours PRN      VITAL SIGNS: Last 24 Hours  T(C): 35.6 (22 May 2020 05:05), Max: 35.7 (21 May 2020 12:52)  T(F): 96.1 (22 May 2020 05:05), Max: 96.2 (21 May 2020 12:52)  HR: 77 (22 May 2020 05:05) (70 - 77)  BP: 97/57 (22 May 2020 05:05) (90/57 - 126/64)  BP(mean): 71 (21 May 2020 12:52) (71 - 71)  RR: 18 (22 May 2020 05:05) (18 - 18)  SpO2: --    LABS:                        8.8    4.77  )-----------( 119      ( 21 May 2020 05:26 )             28.4     05-21    139  |  103  |  62<HH>  ----------------------------<  83  4.4   |  21  |  5.7<HH>    Ca    8.6      21 May 2020 05:26  Phos  5.4     05-21  Mg     2.2     05-21    TPro  5.1<L>  /  Alb  3.0<L>  /  TBili  0.4  /  DBili  x   /  AST  9   /  ALT  6   /  AlkPhos  51  05-21    PT/INR - ( 21 May 2020 05:26 )   PT: 11.80 sec;   INR: 1.03 ratio       PTT - ( 21 May 2020 05:26 )  PTT:34.4 sec      RADIOLOGY:  No interval imaging performed     PHYSICAL EXAM:  CONSTITUTIONAL: No acute distress, well-developed, well-groomed, AAOx3  HEAD: Atraumatic, normocephalic  EYES: EOM intact, PERRLA, conjunctiva and sclera clear  ENT: Supple, no masses, no thyromegaly, no bruits, no JVD; moist mucous membranes  PULMONARY: Clear to auscultation bilaterally; no wheezes, rales, or rhonchi  CARDIOVASCULAR: Regular rate and rhythm; no murmurs, rubs, or gallops  GASTROINTESTINAL: Soft, non-tender, non-distended; bowel sounds present  MUSCULOSKELETAL: 2+ peripheral pulses; no clubbing, no cyanosis, no edema  NEUROLOGY: non-focal  SKIN: No rashes or lesions; warm and dry    ASSESSMENT & PLAN  Patient is a 69yo female with PMH of heart failure with reduced ejection fraction, CAD s/p CABG 10/2019, stomach cancer, bladder prolapse, diabetes mellitus, paroxysmal atrial fibrillation, and history of lower GI bleed who presented with abdominal distention for the 2 days' duration which has been worsening. On admission, patient was found to have RIC and was in acute on chronic  heart failure exacerbation in setting of atrial flutter. Patient was admitted to the ICU for acute renal failure and was downgraded on 5/19/2020.    #Acute cardiogenic shock, resolved  #Acute on chronic heart failure exacerbation with reduced ejection fraction secondary to rapid atrial flutter s/p cardioversion 5/15/2020  - Echo 5/17/2020: EF 20-25%, severely decreased LV systolic function, moderate-severe MR, moderate AS, moderate TR, mild pulmonary hypertension, bilateral pleural effusions  - Pro-BNP on admission: 26,469  - Troponin: 0.23->0.23->0.25->0.21  - ECG on admission showed atrial flutter  - Recent outpatient echocardiogram showed EF 45-50% per patient  - EP consult appreciated  - Patient underwent cardioversion on 5/15/2020 after JAMILA showed no intra-cardiac clots  - S/p amiodarone infusion  - Start amiodarone 200mg PO QD  - Discontinue heparin drip  - [id number]: 5; HAS-BLED: 5  - Consider transitioning anticoagulation to Eliquis 2.5mg PO BID  - Continue with metoprolol tartrate 25mg PO Q8H  - Heart failure consult appreciated  - Holding carvedilol, Valtessa, Ivabradine, and Entresto for now give RIC on HD  - Patient refusing LifeVest    #RIC on CKD stage 4 currently on hemodialysis T/Th/S  - Secondary to ATN vs AIN (eosinophils in urine) vs cardiorenal (EF severely reduced 45-50%->20-25%)  - Baseline creatinine: 0.9 in 10/2019  - Creatinine yesterday: 5.7  - Patient is anuric and failed furosemide challenge (180mg)  - UA noted for eosinophils, suggestive of interstitial nephritis  - Patient was on ciprofloxacin outpatient for UTI  - Nephrology consult appreciated  - Monitor urine output  - FeNa >1%  - US abdomen: echogenic material around Castellon catheter in urinary bladder  - S/p renal biopsy and Tesio catheter placement by IR 5/21/2020    #CAD s/p CABG 10/2019  - Continue with metoprolol 12.5mg PO Q6H  - Continue with aspirin 81mg PO QD  - Continue with atorvastatin 40mg PO QHS  - Continue with metoprolol tartrate 25mg PO Q8H    #UTI in setting of chronic Castellon catheter, resolved  - Urine Cx 5/11/2020: ALLEN  - Completed course of nafcillin    #Left-sided pleural effusion s/p thoracentesis x2, improving  - Thoracentesis done on admission, which drained 300mL of transudative fluid  - Repeat chest X-ray showed re-accumulation of fluid  - Thoracentesis repeated on 5/14/2020, which drained 650mL  - Pulmonology consult appreciated: follow outpatient    #Chronic iron deficiency anemia with history of GI bleed 10/2019  - Per patient, outpatient colonoscopy revealed "rectal ulcer"  - Receives IV iron infusions every 2-3 months  - Follow outpatient with gastroenterology    #Diabetes mellitus type 2 with history of DKA in 10/2019  - Hemoglobin A1c 5/12/2020: 5.8  - Patient was hypoglycemic to 67 this morning  - Change insulin glargine to 9 units SQ QHS  - Change insulin lispro to 3 units SQ TID  - Insulin sliding scale coverage  - Monitor fingerstick glucose    #History of stomach cancer  - Follow outpatient with oncology    #Bladder prolapse  - Continue with Castellon catheter    #Depression  - Continue with sertraline 25mg PO QD    #Misc  - DVT Prophylaxis: will transition to   - GI Prophylaxis: pantoprazole 40mg PO QD   - Diet:  DASH/TLC, consistent carbohydrate, [id number]  - Activity: ambulate as tolerated  - IV Fluids: not indicated  - Code Status: Full Code    Dispo: medically stable for discharge, pending outpatient hemodialysis setup GUS WILBURN 69y Female  MRN#: [id number]   Hospital Day: 11d    SUBJECTIVE  Patient is a 69y old Female who presents with a chief complaint of sob (22 May 2020 09:48)  Currently admitted to medicine with the primary diagnosis of CHF (congestive heart failure)    INTERVAL HPI AND OVERNIGHT EVENTS:  Patient was examined and seen at bedside. This morning she is resting comfortably in bed and reports no issues or overnight events. Patient had Tesio catheter placed in right subclavian and had renal biopsy done as well.    REVIEW OF SYMPTOMS:  CONSTITUTIONAL: No weakness, fevers or chills; No headaches  EYES: No visual changes, eye pain, or discharge  ENT: No vertigo; No ear pain or change in hearing; No sore throat or difficulty swallowing  NECK: No pain or stiffness  RESPIRATORY: No cough, wheezing, or hemoptysis; No shortness of breath  CARDIOVASCULAR: No chest pain or palpitations  GASTROINTESTINAL: No abdominal or epigastric pain; No nausea, vomiting, or hematemesis; No diarrhea or constipation; No melena or hematochezia  GENITOURINARY: No dysuria, frequency or hematuria  MUSCULOSKELETAL: No joint pain, flank tenderness at biopsy site, no weakness  NEUROLOGICAL: No numbness or weakness  SKIN: No itching or rashes    OBJECTIVE  PAST MEDICAL & SURGICAL HISTORY  Heart failure  Female bladder prolapse  Diabetes  S/P CABG (coronary artery bypass graft)  History of repair of hip fracture    ALLERGIES:  No Known Allergies    MEDICATIONS:  STANDING MEDICATIONS  aMIOdarone Infusion 1 mG/Min IV Continuous <Continuous>  ascorbic acid 500 milliGRAM(s) Oral daily  aspirin  chewable 81 milliGRAM(s) Oral daily  atorvastatin 40 milliGRAM(s) Oral at bedtime  chlorhexidine 4% Liquid 1 Application(s) Topical <User Schedule>  insulin glargine Injectable (LANTUS) 12 Unit(s) SubCutaneous every morning  insulin lispro Injectable (HumaLOG) 4 Unit(s) SubCutaneous three times a day before meals  lactobacillus acidophilus 1 Tablet(s) Oral three times a day  melatonin 3 milliGRAM(s) Oral at bedtime  metoprolol tartrate 25 milliGRAM(s) Oral every 8 hours  pantoprazole    Tablet 40 milliGRAM(s) Oral before breakfast  sertraline 25 milliGRAM(s) Oral daily  tiotropium 18 MICROgram(s) Capsule 1 Capsule(s) Inhalation daily    PRN MEDICATIONS  acetaminophen   Tablet .. 650 milliGRAM(s) Oral every 6 hours PRN  aluminum hydroxide/magnesium hydroxide/simethicone Suspension 15 milliLiter(s) Oral once PRN  guaifenesin/dextromethorphan  Syrup 10 milliLiter(s) Oral every 6 hours PRN  loperamide 2 milliGRAM(s) Oral every 4 hours PRN      VITAL SIGNS: Last 24 Hours  T(C): 35.6 (22 May 2020 05:05), Max: 35.7 (21 May 2020 12:52)  T(F): 96.1 (22 May 2020 05:05), Max: 96.2 (21 May 2020 12:52)  HR: 77 (22 May 2020 05:05) (70 - 77)  BP: 97/57 (22 May 2020 05:05) (90/57 - 126/64)  BP(mean): 71 (21 May 2020 12:52) (71 - 71)  RR: 18 (22 May 2020 05:05) (18 - 18)  SpO2: --    LABS:                        8.8    4.77  )-----------( 119      ( 21 May 2020 05:26 )             28.4     05-21    139  |  103  |  62<HH>  ----------------------------<  83  4.4   |  21  |  5.7<HH>    Ca    8.6      21 May 2020 05:26  Phos  5.4     05-21  Mg     2.2     05-21    TPro  5.1<L>  /  Alb  3.0<L>  /  TBili  0.4  /  DBili  x   /  AST  9   /  ALT  6   /  AlkPhos  51  05-21    PT/INR - ( 21 May 2020 05:26 )   PT: 11.80 sec;   INR: 1.03 ratio       PTT - ( 21 May 2020 05:26 )  PTT:34.4 sec      RADIOLOGY:  No interval imaging performed     PHYSICAL EXAM:  CONSTITUTIONAL: No acute distress, well-developed, well-groomed, AAOx3  HEAD: Atraumatic, normocephalic  EYES: EOM intact, PERRLA, conjunctiva and sclera clear  ENT: Supple, no masses, no thyromegaly, no bruits, no JVD; moist mucous membranes  PULMONARY: Clear to auscultation bilaterally; no wheezes, rales, or rhonchi  CARDIOVASCULAR: Regular rate and rhythm; no murmurs, rubs, or gallops  GASTROINTESTINAL: Soft, non-tender, non-distended; bowel sounds present  MUSCULOSKELETAL: 2+ peripheral pulses; no clubbing, no cyanosis, 1+ pitting edema in bilateral lower extremities  NEUROLOGY: non-focal  SKIN: No rashes or lesions; warm and dry    ASSESSMENT & PLAN  Patient is a 67yo female with PMH of heart failure with reduced ejection fraction, CAD s/p CABG 10/2019, stomach cancer, bladder prolapse, diabetes mellitus, paroxysmal atrial fibrillation, and history of lower GI bleed who presented with abdominal distention for the 2 days' duration which has been worsening. On admission, patient was found to have RIC and was in acute on chronic  heart failure exacerbation in setting of atrial flutter. Patient was admitted to the ICU for acute renal failure and was downgraded on 5/19/2020.    #Acute cardiogenic shock, resolved  #Acute on chronic heart failure exacerbation with reduced ejection fraction secondary to rapid atrial flutter s/p cardioversion 5/15/2020  - Echo 5/17/2020: EF 20-25%, severely decreased LV systolic function, moderate-severe MR, moderate AS, moderate TR, mild pulmonary hypertension, bilateral pleural effusions  - Pro-BNP on admission: 26,469  - Troponin: 0.23->0.23->0.25->0.21  - ECG on admission showed atrial flutter  - Recent outpatient echocardiogram showed EF 45-50% per patient  - EP consult appreciated  - Patient underwent cardioversion on 5/15/2020 after JAMILA showed no intra-cardiac clots  - S/p amiodarone infusion  - Start amiodarone 200mg PO QD  - Discontinue heparin drip  - [id number]: 5; HAS-BLED: 5  - Consider transitioning anticoagulation to Eliquis 2.5mg PO BID  - Continue with metoprolol tartrate 25mg PO Q8H  - Heart failure consult appreciated  - Holding carvedilol, Valtessa, Ivabradine, and Entresto for now give RIC on HD  - Patient refusing LifeVest    #RIC on CKD stage 4 currently on hemodialysis T/Th/S  - Secondary to ATN vs AIN (eosinophils in urine) vs cardiorenal (EF severely reduced 45-50%->20-25%)  - Baseline creatinine: 0.9 in 10/2019  - Creatinine yesterday: 5.7  - Patient is anuric and failed furosemide challenge (180mg)  - UA noted for eosinophils, suggestive of interstitial nephritis  - Patient was on ciprofloxacin outpatient for UTI  - Nephrology consult appreciated  - Monitor urine output  - FeNa >1%  - US abdomen: echogenic material around Castellon catheter in urinary bladder  - S/p renal biopsy and Tesio catheter placement by IR 5/21/2020    #CAD s/p CABG 10/2019  - Continue with metoprolol 12.5mg PO Q6H  - Continue with aspirin 81mg PO QD  - Continue with atorvastatin 40mg PO QHS  - Continue with metoprolol tartrate 25mg PO Q8H    #UTI in setting of chronic Castellon catheter, resolved  - Urine Cx 5/11/2020: ALLEN  - Completed course of nafcillin    #Left-sided pleural effusion s/p thoracentesis x2, improving  - Thoracentesis done on admission, which drained 300mL of transudative fluid  - Repeat chest X-ray showed re-accumulation of fluid  - Thoracentesis repeated on 5/14/2020, which drained 650mL  - Pulmonology consult appreciated: follow outpatient    #Chronic iron deficiency anemia with history of GI bleed 10/2019  - Per patient, outpatient colonoscopy revealed "rectal ulcer"  - Receives IV iron infusions every 2-3 months  - Follow outpatient with gastroenterology    #Diabetes mellitus type 2 with history of DKA in 10/2019  - Hemoglobin A1c 5/12/2020: 5.8  - Patient was hypoglycemic to 67 this morning  - Change insulin glargine to 9 units SQ QHS  - Change insulin lispro to 3 units SQ TID  - Insulin sliding scale coverage  - Monitor fingerstick glucose    #History of stomach cancer  - Follow outpatient with oncology    #Bladder prolapse  - Continue with Castellon catheter    #Depression  - Continue with sertraline 25mg PO QD    #Misc  - DVT Prophylaxis: will transition to   - GI Prophylaxis: pantoprazole 40mg PO QD   - Diet:  DASH/TLC, consistent carbohydrate, [id number]  - Activity: ambulate as tolerated  - IV Fluids: not indicated  - Code Status: Full Code    Dispo: medically stable for discharge, pending outpatient hemodialysis setup

## 2020-05-22 NOTE — PROGRESS NOTE ADULT - PROBLEM SELECTOR PLAN 1
Decreased LVEF from ~ 45 to 15% likely related to atrial flutter with RVR   Continue HD for volume management   Continue metoprolol 25 mg tid   Strict I/Os  Daily weight  DC planning after setting up outpatient HD

## 2020-05-22 NOTE — PROGRESS NOTE ADULT - SUBJECTIVE AND OBJECTIVE BOX
Date of Admission:    CHIEF COMPLAINT: HFrEF    HISTORY OF PRESENT ILLNESS: 69yFemale with PMH below presented to the hospital for     PAST MEDICAL & SURGICAL HISTORY:  Heart failure  Female bladder prolapse  Diabetes  S/P CABG (coronary artery bypass graft)  History of repair of hip fracture    HEALTH ISSUES - PROBLEM Dx:  Acute on chronic systolic heart failure: Acute on chronic systolic heart failure  RIC (acute kidney injury): RIC (acute kidney injury)  Urinary tract infection without hematuria, site unspecified: Urinary tract infection without hematuria, site unspecified  Atrial flutter, unspecified type: Atrial flutter, unspecified type  Acute on chronic systolic (congestive) heart failure: Acute on chronic systolic (congestive) heart failure  Shock: Shock        FAMILY HISTORY:  FH: myocardial infarction (Mother)  FH: stomach cancer (Mother)    Allergies    No Known Allergies    Intolerances    	  Home Medications:  acetaminophen 325 mg oral tablet: 2 tab(s) orally every 6 hours, As needed, Mild Pain (1 - 3) (2019 13:03)  ascorbic acid 500 mg oral tablet: 1 tab(s) orally once a day (2019 13:03)  aspirin 81 mg oral tablet, chewable: 1 tab(s) orally once a day (2019 13:03)  atorvastatin 40 mg oral tablet: 1 tab(s) orally once a day (at bedtime) (2019 13:03)  carvedilol 3.125 mg oral tablet: 3 tab(s) orally every 12 hours (2019 13:03)  furosemide 20 mg oral tablet: 1 tab(s) orally once a day (2019 13:03)  heparin: 5000 unit(s) subcutaneous every 8 hours (2019 13:03)  hydrALAZINE 10 mg oral tablet: 1 tab(s) orally every 8 hours (2019 13:03)  isosorbide dinitrate 5 mg oral tablet: 1 tab(s) orally 3 times a day (2019 13:03)  lactobacillus acidophilus oral capsule: 1 tab(s) orally 3 times a day (2019 13:03)  megestrol 40 mg/mL oral suspension: 20 milliliter(s) orally once a day (2019 13:03)  metFORMIN 1000 mg oral tablet: 1 tab(s) orally every 12 hours (2019 13:03)  nystatin 100,000 units/g topical cream: 1 application topically 2 times a day (2019 13:03)  pantoprazole 40 mg oral delayed release tablet: 1 tab(s) orally once a day (before a meal) (2019 13:03)  sacubitril-valsartan 24 mg-26 mg oral tablet: 1 tab(s) orally 2 times a day (2019 13:03)  sertraline 25 mg oral tablet: 1 tab(s) orally once a day (2019 13:03)    MEDICATIONS  (STANDING):  aMIOdarone Infusion 1 mG/Min (33.3 mL/Hr) IV Continuous <Continuous>  ascorbic acid 500 milliGRAM(s) Oral daily  aspirin  chewable 81 milliGRAM(s) Oral daily  atorvastatin 40 milliGRAM(s) Oral at bedtime  chlorhexidine 4% Liquid 1 Application(s) Topical <User Schedule>  insulin glargine Injectable (LANTUS) 12 Unit(s) SubCutaneous every morning  insulin lispro Injectable (HumaLOG) 4 Unit(s) SubCutaneous three times a day before meals  lactobacillus acidophilus 1 Tablet(s) Oral three times a day  melatonin 3 milliGRAM(s) Oral at bedtime  metoprolol tartrate 25 milliGRAM(s) Oral every 8 hours  pantoprazole    Tablet 40 milliGRAM(s) Oral before breakfast  sertraline 25 milliGRAM(s) Oral daily  tiotropium 18 MICROgram(s) Capsule 1 Capsule(s) Inhalation daily    MEDICATIONS  (PRN):  acetaminophen   Tablet .. 650 milliGRAM(s) Oral every 6 hours PRN Moderate Pain (4 - 6)  aluminum hydroxide/magnesium hydroxide/simethicone Suspension 15 milliLiter(s) Oral once PRN Dyspepsia  guaifenesin/dextromethorphan  Syrup 10 milliLiter(s) Oral every 6 hours PRN Cough  loperamide 2 milliGRAM(s) Oral every 4 hours PRN Diarrhea              SOCIAL HISTORY:    [ ] Non-smoker  [ ] Smoker  [ ] Alcohol      REVIEW OF SYSTEMS:  CONSTITUTIONAL: No fever, weight loss, or fatigue  CARDIOLOGY: PAtient denies chest pain, shortness of breath or syncopal episodes.   RESPIRATORY: denies shortness of breath, wheezeing.   NEUROLOGICAL: NO weakness, no focal deficits to report.  ENDOCRINOLOGICAL: no recent change in diabetic medications.   GI: no BRBPR, no N,V,diarrhea.    PSYCHIATRY: normal mood and affect  HEENT: no nasal discharge, no ecchymosis  SKIN: no ecchymosis, no breakdown  MUSCULOSKELETAL: Full range of motion x4.      PHYSICAL EXAM:  T(C): 35.6 (20 @ 05:05), Max: 35.7 (20 @ 12:52)  HR: 77 (20 @ 05:05) (70 - 77)  BP: 97/57 (20 @ 05:05) (90/57 - 126/64)  RR: 18 (20 @ 05:05) (18 - 18)  SpO2: --  Wt(kg): --  I&O's Summary    21 May 2020 07:01  -  22 May 2020 07:00  --------------------------------------------------------  IN: 0 mL / OUT: 75 mL / NET: -75 mL      Daily     Daily Weight in k.4 (22 May 2020 05:05)    General Appearance: Normal	  Cardiovascular: Normal S1 S2, No JVD, No murmurs, No edema  Respiratory: Lungs clear to auscultation	  Psychiatry: A & O x 3, Mood & affect appropriate  Gastrointestinal:  Soft, Non-tender  Skin: No rashes, No ecchymoses, No cyanosis	  Neurologic: Non-focal  Extremities: Normal range of motion, No clubbing, cyanosis or edema  Vascular: Peripheral pulses palpable 2+ bilaterally        LABS:	 	                          8.8    4.77  )-----------( 119      ( 21 May 2020 05:26 )             28.4         139  |  103  |  62<HH>  ----------------------------<  83  4.4   |  21  |  5.7<HH>    Ca    8.6      21 May 2020 05:26  Phos  5.4       Mg     2.2         TPro  5.1<L>  /  Alb  3.0<L>  /  TBili  0.4  /  DBili  x   /  AST  9   /  ALT  6   /  AlkPhos  51          PT/INR - ( 21 May 2020 05:26 )   PT: 11.80 sec;   INR: 1.03 ratio         PTT - ( 21 May 2020 05:26 )  PTT:34.4 sec    proBNP:   Lipid Profile:   HgA1c:   TSH:       CARDIAC MARKERS:            TELEMETRY EVENTS: 	    ECG:  	  RADIOLOGY:  OTHER: 	    PREVIOUS DIAGNOSTIC TESTING:    [ ] Echocardiogram:  [ ]  Catheterization:  [ ] Stress Test:  	  	  ASSESSMENT/PLAN:

## 2020-05-22 NOTE — PROGRESS NOTE ADULT - ATTENDING COMMENTS
#Progress Note Handoff  Pending (specify): pending outpatient HD slot set up   Family discussion: pt AOX3 and of sound mind   Disposition: Home

## 2020-05-22 NOTE — DISCHARGE NOTE PROVIDER - NSDCFUSCHEDAPPT_GEN_ALL_CORE_FT
[facility] ; 06/03/2020 ; [facility] [address]  [facility] ; 06/26/2020 ; [facility] [address] [facility] ; 06/03/2020 ; [facility] [address]  [facility] ; 06/26/2020 ; [facility] [address] [facility] ; 06/03/2020 ; [facility] [address]  [facility] ; 06/26/2020 ; [facility] [address] [facility] ; 06/03/2020 ; [facility] [address]  [facility] ; 06/26/2020 ; [facility] [address] [facility] ; 06/03/2020 ; [facility] [address]  [facility] ; 06/26/2020 ; [facility] [address] [facility] ; 06/03/2020 ; [facility] [address]  [facility] ; 06/26/2020 ; [facility] [address]

## 2020-05-23 NOTE — PROGRESS NOTE ADULT - SUBJECTIVE AND OBJECTIVE BOX
GUS WILBURN 69y Female  MRN#: [id number]   Hospital Day: 12d    SUBJECTIVE  Patient is a 69y old Female who presents with a chief complaint of sob (22 May 2020 15:17)  Currently admitted to medicine with the primary diagnosis of CHF (congestive heart failure)    INTERVAL HPI AND OVERNIGHT EVENTS:  Patient was examined and seen at bedside. This morning she is resting comfortably in bed and reports no issues.    After dialysis yesterday afternoon, patient was hypotensive to the 80s. When she returned to the floor, systolic BP was in the 60s. Patient was given 250mL bolus of IV fluids and blood pressure improved. Patient was asymptomatic during the entire event.    REVIEW OF SYMPTOMS:  CONSTITUTIONAL: No weakness, fevers or chills; No headaches  EYES: No visual changes, eye pain, or discharge  ENT: No vertigo; No ear pain or change in hearing; No sore throat or difficulty swallowing  NECK: No pain or stiffness  RESPIRATORY: No cough, wheezing, or hemoptysis; No shortness of breath  CARDIOVASCULAR: No chest pain or palpitations  GASTROINTESTINAL: No abdominal or epigastric pain; No nausea, vomiting, or hematemesis; No diarrhea or constipation; No melena or hematochezia  GENITOURINARY: No dysuria, frequency or hematuria  MUSCULOSKELETAL: No joint pain, no muscle pain, no weakness  NEUROLOGICAL: No numbness or weakness  SKIN: No itching or rashes    OBJECTIVE  PAST MEDICAL & SURGICAL HISTORY  Heart failure  Female bladder prolapse  Diabetes  S/P CABG (coronary artery bypass graft)  History of repair of hip fracture    ALLERGIES:  No Known Allergies    MEDICATIONS:  STANDING MEDICATIONS  aMIOdarone    Tablet 200 milliGRAM(s) Oral daily  ascorbic acid 500 milliGRAM(s) Oral daily  aspirin  chewable 81 milliGRAM(s) Oral daily  atorvastatin 40 milliGRAM(s) Oral at bedtime  chlorhexidine 4% Liquid 1 Application(s) Topical <User Schedule>  insulin glargine Injectable (LANTUS) 9 Unit(s) SubCutaneous at bedtime  insulin lispro Injectable (HumaLOG) 3 Unit(s) SubCutaneous three times a day before meals  lactobacillus acidophilus 1 Tablet(s) Oral three times a day  melatonin 3 milliGRAM(s) Oral at bedtime  pantoprazole    Tablet 40 milliGRAM(s) Oral before breakfast  sertraline 25 milliGRAM(s) Oral daily  sodium chloride 0.9% Bolus 250 milliLiter(s) IV Bolus once  tiotropium 18 MICROgram(s) Capsule 1 Capsule(s) Inhalation daily    PRN MEDICATIONS  acetaminophen   Tablet .. 650 milliGRAM(s) Oral every 6 hours PRN  aluminum hydroxide/magnesium hydroxide/simethicone Suspension 15 milliLiter(s) Oral once PRN  guaifenesin/dextromethorphan  Syrup 10 milliLiter(s) Oral every 6 hours PRN  loperamide 2 milliGRAM(s) Oral every 4 hours PRN      VITAL SIGNS: Last 24 Hours  T(C): 36.5 (23 May 2020 05:05), Max: 36.5 (23 May 2020 05:05)  T(F): 97.7 (23 May 2020 05:05), Max: 97.7 (23 May 2020 05:05)  HR: 87 (23 May 2020 05:05) (79 - 99)  BP: 93/52 (23 May 2020 05:05) (67/40 - 106/69)  BP(mean): --  RR: 18 (23 May 2020 05:05) (18 - 18)  SpO2: 99% (22 May 2020 17:50) (88% - 99%)    LABS:  Pending labs    RADIOLOGY:  No interval imaging performed     PHYSICAL EXAM:  CONSTITUTIONAL: No acute distress, well-developed, well-groomed, AAOx3  HEAD: Atraumatic, normocephalic  EYES: EOM intact, PERRLA, conjunctiva and sclera clear  ENT: Supple, no masses, no thyromegaly, no bruits, no JVD; moist mucous membranes  PULMONARY: Clear to auscultation bilaterally; no wheezes, rales, or rhonchi  CARDIOVASCULAR: Regular rate and rhythm; no murmurs, rubs, or gallops  GASTROINTESTINAL: Soft, non-tender, non-distended; bowel sounds present  MUSCULOSKELETAL: 2+ peripheral pulses; no clubbing, no cyanosis, no edema  NEUROLOGY: non-focal  SKIN: No rashes or lesions; warm and dry    ASSESSMENT & PLAN  Patient is a 67yo female with PMH of heart failure with reduced ejection fraction, CAD s/p CABG 10/2019, stomach cancer, bladder prolapse, diabetes mellitus, paroxysmal atrial fibrillation, and history of lower GI bleed who presented with abdominal distention for the 2 days' duration which has been worsening. On admission, patient was found to have RIC and was in acute on chronic  heart failure exacerbation in setting of atrial flutter. Patient was admitted to the ICU for acute renal failure and was downgraded on 5/19/2020.    #Acute cardiogenic shock, resolved  #Acute on chronic heart failure exacerbation with reduced ejection fraction secondary to rapid atrial flutter s/p cardioversion 5/15/2020  - Echo 5/17/2020: EF 20-25%, severely decreased LV systolic function, moderate-severe MR, moderate AS, moderate TR, mild pulmonary hypertension, bilateral pleural effusions  - Pro-BNP on admission: 26,469  - Troponin: 0.23->0.23->0.25->0.21  - ECG on admission showed atrial flutter  - Recent outpatient echocardiogram showed EF 45-50% per patient  - EP consult appreciated  - Patient underwent cardioversion on 5/15/2020 after JAMILA showed no intra-cardiac clots  - S/p amiodarone infusion  - Start amiodarone 200mg PO QD  - Discontinue heparin drip  - [id number]: 5; HAS-BLED: 5  - Consider transitioning anticoagulation to Eliquis 2.5mg PO BID  - Continue with metoprolol tartrate 25mg PO Q8H  - Heart failure consult appreciated  - Holding carvedilol, Valtessa, Ivabradine, and Entresto for now give RIC on HD  - Patient refusing LifeVest    #RIC on CKD stage 4 currently on hemodialysis T/Th/S  - Secondary to ATN vs AIN (eosinophils in urine) vs cardiorenal (EF severely reduced 45-50%->20-25%)  - Baseline creatinine: 0.9 in 10/2019  - Creatinine yesterday: 5.7  - Patient is anuric and failed furosemide challenge (180mg)  - UA noted for eosinophils, suggestive of interstitial nephritis  - Patient was on ciprofloxacin outpatient for UTI  - Nephrology consult appreciated  - Monitor urine output  - FeNa >1%  - US abdomen: echogenic material around Castellon catheter in urinary bladder  - S/p renal biopsy and Tesio catheter placement by IR 5/21/2020    #CAD s/p CABG 10/2019  - Continue with metoprolol 12.5mg PO Q6H  - Continue with aspirin 81mg PO QD  - Continue with atorvastatin 40mg PO QHS  - Continue with metoprolol tartrate 25mg PO Q8H    #UTI in setting of chronic Castellon catheter, resolved  - Urine Cx 5/11/2020: ALLEN  - Completed course of nafcillin    #Left-sided pleural effusion s/p thoracentesis x2, improving  - Thoracentesis done on admission, which drained 300mL of transudative fluid  - Repeat chest X-ray showed re-accumulation of fluid  - Thoracentesis repeated on 5/14/2020, which drained 650mL  - Pulmonology consult appreciated: follow outpatient    #Chronic iron deficiency anemia with history of GI bleed 10/2019  - Per patient, outpatient colonoscopy revealed "rectal ulcer"  - Receives IV iron infusions every 2-3 months  - Follow outpatient with gastroenterology    #Diabetes mellitus type 2 with history of DKA in 10/2019  - Hemoglobin A1c 5/12/2020: 5.8  - Patient was hypoglycemic to 67 this morning  - Change insulin glargine to 9 units SQ QHS  - Change insulin lispro to 3 units SQ TID  - Insulin sliding scale coverage  - Monitor fingerstick glucose    #History of stomach cancer  - Follow outpatient with oncology    #Bladder prolapse  - Continue with Castellon catheter    #Depression  - Continue with sertraline 25mg PO QD    #Misc  - DVT Prophylaxis: will transition to   - GI Prophylaxis: pantoprazole 40mg PO QD   - Diet:  DASH/TLC, consistent carbohydrate, [id number]  - Activity: ambulate as tolerated  - IV Fluids: not indicated  - Code Status: Full Code    Dispo: medically stable for discharge, pending outpatient hemodialysis setup GUS WILBURN 69y Female  MRN#: [id number]   Hospital Day: 12d    SUBJECTIVE  Patient is a 69y old Female who presents with a chief complaint of sob (22 May 2020 15:17)  Currently admitted to medicine with the primary diagnosis of CHF (congestive heart failure)    INTERVAL HPI AND OVERNIGHT EVENTS:  Patient was examined and seen at bedside. This morning she is resting comfortably in bed and reports no issues.    After dialysis yesterday afternoon, patient was hypotensive to the 80s. When she returned to the floor, systolic BP was in the 60s. Patient was given 250mL bolus of IV fluids and blood pressure improved. Patient felt weak yesterday, but feels better today. Blood pressure is 78/40 this morning. Will transfuse 1 unit PRBC given hemoglobin 6.8.    REVIEW OF SYMPTOMS:  CONSTITUTIONAL: No weakness, fevers or chills; No headaches  EYES: No visual changes, eye pain, or discharge  ENT: No vertigo; No ear pain or change in hearing; No sore throat or difficulty swallowing  NECK: No pain or stiffness  RESPIRATORY: No cough, wheezing, or hemoptysis; No shortness of breath  CARDIOVASCULAR: No chest pain or palpitations  GASTROINTESTINAL: No abdominal or epigastric pain; No nausea, vomiting, or hematemesis; No diarrhea or constipation; No melena or hematochezia  GENITOURINARY: No dysuria, frequency or hematuria  MUSCULOSKELETAL: No joint pain, no muscle pain, no weakness  NEUROLOGICAL: No numbness or weakness  SKIN: No itching or rashes    OBJECTIVE  PAST MEDICAL & SURGICAL HISTORY  Heart failure  Female bladder prolapse  Diabetes  S/P CABG (coronary artery bypass graft)  History of repair of hip fracture    ALLERGIES:  No Known Allergies    MEDICATIONS:  STANDING MEDICATIONS  aMIOdarone    Tablet 200 milliGRAM(s) Oral daily  ascorbic acid 500 milliGRAM(s) Oral daily  aspirin  chewable 81 milliGRAM(s) Oral daily  atorvastatin 40 milliGRAM(s) Oral at bedtime  chlorhexidine 4% Liquid 1 Application(s) Topical <User Schedule>  insulin glargine Injectable (LANTUS) 9 Unit(s) SubCutaneous at bedtime  insulin lispro Injectable (HumaLOG) 3 Unit(s) SubCutaneous three times a day before meals  lactobacillus acidophilus 1 Tablet(s) Oral three times a day  melatonin 3 milliGRAM(s) Oral at bedtime  pantoprazole    Tablet 40 milliGRAM(s) Oral before breakfast  sertraline 25 milliGRAM(s) Oral daily  sodium chloride 0.9% Bolus 250 milliLiter(s) IV Bolus once  tiotropium 18 MICROgram(s) Capsule 1 Capsule(s) Inhalation daily    PRN MEDICATIONS  acetaminophen   Tablet .. 650 milliGRAM(s) Oral every 6 hours PRN  aluminum hydroxide/magnesium hydroxide/simethicone Suspension 15 milliLiter(s) Oral once PRN  guaifenesin/dextromethorphan  Syrup 10 milliLiter(s) Oral every 6 hours PRN  loperamide 2 milliGRAM(s) Oral every 4 hours PRN      VITAL SIGNS: Last 24 Hours  T(C): 36.5 (23 May 2020 05:05), Max: 36.5 (23 May 2020 05:05)  T(F): 97.7 (23 May 2020 05:05), Max: 97.7 (23 May 2020 05:05)  HR: 87 (23 May 2020 05:05) (79 - 99)  BP: 93/52 (23 May 2020 05:05) (67/40 - 106/69)  BP(mean): --  RR: 18 (23 May 2020 05:05) (18 - 18)  SpO2: 99% (22 May 2020 17:50) (88% - 99%)    LABS:                        6.8    4.31  )-----------( 126      ( 23 May 2020 06:05 )             22.4     05-23    138  |  100  |  38<H>  ----------------------------<  93  4.0   |  26  |  3.9<H>    Ca    7.9<L>      23 May 2020 06:05  Phos  4.5     05-23  Mg     2.0     05-23    TPro  4.6<L>  /  Alb  2.8<L>  /  TBili  0.5  /  DBili  x   /  AST  9   /  ALT  6   /  AlkPhos  50  05-23    PT/INR - ( 23 May 2020 06:05 )   PT: 12.00 sec;   INR: 1.04 ratio      PTT - ( 23 May 2020 06:05 )  PTT:33.0 sec      RADIOLOGY:  No interval imaging performed     PHYSICAL EXAM:  CONSTITUTIONAL: No acute distress, well-developed, well-groomed, AAOx3; pale; Tesio catheter in right subclavian  HEAD: Atraumatic, normocephalic  EYES: EOM intact, PERRLA, conjunctiva and sclera clear  ENT: Supple, no masses, no thyromegaly, no bruits, no JVD; moist mucous membranes  PULMONARY: Clear to auscultation bilaterally; no wheezes, rales, or rhonchi  CARDIOVASCULAR: Regular rate and rhythm; no murmurs, rubs, or gallops  GASTROINTESTINAL: Soft, non-tender, non-distended; bowel sounds present  MUSCULOSKELETAL: 2+ peripheral pulses; no clubbing, no cyanosis, no edema  NEUROLOGY: non-focal  SKIN: No rashes or lesions; warm and dry; no evidence of hematoma or ecchymosis at previous [place] site or renal biopsy site    ASSESSMENT & PLAN  Patient is a 69yo female with PMH of heart failure with reduced ejection fraction, CAD s/p CABG 10/2019, stomach cancer, bladder prolapse, diabetes mellitus, paroxysmal atrial fibrillation, and history of lower GI bleed who presented with abdominal distention for the 2 days' duration which has been worsening. On admission, patient was found to have RIC and was in acute on chronic  heart failure exacerbation in setting of atrial flutter. Patient was admitted to the ICU for acute renal failure and was downgraded on 5/19/2020.    #Acute cardiogenic shock, resolved  #Acute on chronic heart failure exacerbation with reduced ejection fraction secondary to rapid atrial flutter s/p cardioversion 5/15/2020  - Echo 5/17/2020: EF 20-25%, severely decreased LV systolic function, moderate-severe MR, moderate AS, moderate TR, mild pulmonary hypertension, bilateral pleural effusions  - Pro-BNP on admission: 26,469  - Troponin: 0.23->0.23->0.25->0.21  - ECG on admission showed atrial flutter  - Recent outpatient echocardiogram showed EF 45-50% per patient  - EP consult appreciated  - Patient underwent cardioversion on 5/15/2020 after JAMILA showed no intra-cardiac clots  - S/p amiodarone infusion  - Continue with amiodarone 200mg PO QD  - Continue with metoprolol tartrate 25mg PO Q8H  - [id number]: 5; HAS-BLED: 5  - Patient was given warfarin 5mg on 5/22/2020  - Hold anticoagulation for now given acute on chronic anemia  - Heart failure consult appreciated  - Holding carvedilol, Valtessa, Ivabradine, and Entresto for now give RIC on HD  - Patient refusing LifeVest    #Acute on chronic iron deficiency anemia with history of GI bleed 10/2019  - Per patient, outpatient colonoscopy revealed "rectal ulcer"  - Receives IV iron infusions every 2-3 months  - Baseline hemoglobin: 9  - Hemoglobin today: 6.8  - Patient was given warfarin 5mg on 5/22/2020  - Will transfuse 1 unit PRBC slowly given heart failure  - Maintain 2 peripheral 18-gauge IVs  - Keep active type and screen  - Monitor hemoglobin/hematocrit   - Given recent renal biopsy and previous GI bleed, will order CT abdomen/pelvis  - NPO for now    #RIC on CKD stage 4 currently on hemodialysis T/Th/S  - Secondary to ATN vs AIN (eosinophils in urine) vs cardiorenal (EF severely reduced 45-50%->20-25%)  - Baseline creatinine: 0.9 in 10/2019  - Creatinine today: 3.9  - Patient is anuric and failed furosemide challenge (180mg)  - UA noted for eosinophils, suggestive of interstitial nephritis  - Patient was on ciprofloxacin outpatient for UTI  - Nephrology consult appreciated  - Monitor urine output  - FeNa >1%  - US abdomen: echogenic material around Castellon catheter in urinary bladder  - S/p renal biopsy and Tesio catheter placement by IR 5/21/2020    #CAD s/p CABG 10/2019  - Continue with metoprolol 12.5mg PO Q6H  - Continue with aspirin 81mg PO QD  - Continue with atorvastatin 40mg PO QHS  - Continue with metoprolol tartrate 25mg PO Q8H    #UTI in setting of chronic Castellon catheter, resolved  - Urine Cx 5/11/2020: ALLEN  - Completed course of nafcillin    #Left-sided pleural effusion s/p thoracentesis x2, improving  - Thoracentesis done on admission, which drained 300mL of transudative fluid  - Repeat chest X-ray showed re-accumulation of fluid  - Thoracentesis repeated on 5/14/2020, which drained 650mL  - Pulmonology consult appreciated: follow outpatient    #Diabetes mellitus type 2 with history of DKA in 10/2019  - Hemoglobin A1c 5/12/2020: 5.8  - Patient was hypoglycemic to 67 this morning  - Continue with insulin glargine 9 units SQ QHS  - Hold insulin lispro 3 units SQ TID while NPO  - Insulin sliding scale coverage  - Monitor fingerstick glucose    #History of stomach cancer  - Follow outpatient with oncology    #Bladder prolapse  - Continue with Castellon catheter    #Depression  - Continue with sertraline 25mg PO QD    #Misc  - DVT Prophylaxis: will transition to   - GI Prophylaxis: pantoprazole 40mg PO QD   - Diet:  NPO except medications  - Activity: ambulate as tolerated  - IV Fluids: not indicated  - Code Status: Full Code    Dispo: medically stable for discharge, pending outpatient hemodialysis setup GUS WILBURN 69y Female  MRN#: [id number]   Hospital Day: 12d    SUBJECTIVE  Patient is a 69y old Female who presents with a chief complaint of sob (22 May 2020 15:17)  Currently admitted to medicine with the primary diagnosis of CHF (congestive heart failure)    INTERVAL HPI AND OVERNIGHT EVENTS:  Patient was examined and seen at bedside. This morning she is resting comfortably in bed and reports no issues.    After dialysis yesterday afternoon, patient was hypotensive to the 80s. When she returned to the floor, systolic BP was in the 60s. Patient was given 250mL bolus of IV fluids and blood pressure improved. Patient felt weak yesterday, but feels better today. Blood pressure is 78/40 this morning. Will transfuse 1 unit PRBC given hemoglobin 6.8.    REVIEW OF SYMPTOMS:  CONSTITUTIONAL: No weakness, fevers or chills; No headaches  EYES: No visual changes, eye pain, or discharge  ENT: No vertigo; No ear pain or change in hearing; No sore throat or difficulty swallowing  NECK: No pain or stiffness  RESPIRATORY: No cough, wheezing, or hemoptysis; No shortness of breath  CARDIOVASCULAR: No chest pain or palpitations  GASTROINTESTINAL: No abdominal or epigastric pain; No nausea, vomiting, or hematemesis; No diarrhea or constipation; No melena or hematochezia  GENITOURINARY: No dysuria, frequency or hematuria  MUSCULOSKELETAL: No joint pain, no muscle pain, no weakness  NEUROLOGICAL: No numbness or weakness  SKIN: No itching or rashes    OBJECTIVE  PAST MEDICAL & SURGICAL HISTORY  Heart failure  Female bladder prolapse  Diabetes  S/P CABG (coronary artery bypass graft)  History of repair of hip fracture    ALLERGIES:  No Known Allergies    MEDICATIONS:  STANDING MEDICATIONS  aMIOdarone    Tablet 200 milliGRAM(s) Oral daily  ascorbic acid 500 milliGRAM(s) Oral daily  aspirin  chewable 81 milliGRAM(s) Oral daily  atorvastatin 40 milliGRAM(s) Oral at bedtime  chlorhexidine 4% Liquid 1 Application(s) Topical <User Schedule>  insulin glargine Injectable (LANTUS) 9 Unit(s) SubCutaneous at bedtime  insulin lispro Injectable (HumaLOG) 3 Unit(s) SubCutaneous three times a day before meals  lactobacillus acidophilus 1 Tablet(s) Oral three times a day  melatonin 3 milliGRAM(s) Oral at bedtime  pantoprazole    Tablet 40 milliGRAM(s) Oral before breakfast  sertraline 25 milliGRAM(s) Oral daily  sodium chloride 0.9% Bolus 250 milliLiter(s) IV Bolus once  tiotropium 18 MICROgram(s) Capsule 1 Capsule(s) Inhalation daily    PRN MEDICATIONS  acetaminophen   Tablet .. 650 milliGRAM(s) Oral every 6 hours PRN  aluminum hydroxide/magnesium hydroxide/simethicone Suspension 15 milliLiter(s) Oral once PRN  guaifenesin/dextromethorphan  Syrup 10 milliLiter(s) Oral every 6 hours PRN  loperamide 2 milliGRAM(s) Oral every 4 hours PRN      VITAL SIGNS: Last 24 Hours  T(C): 36.5 (23 May 2020 05:05), Max: 36.5 (23 May 2020 05:05)  T(F): 97.7 (23 May 2020 05:05), Max: 97.7 (23 May 2020 05:05)  HR: 87 (23 May 2020 05:05) (79 - 99)  BP: 93/52 (23 May 2020 05:05) (67/40 - 106/69)  BP(mean): --  RR: 18 (23 May 2020 05:05) (18 - 18)  SpO2: 99% (22 May 2020 17:50) (88% - 99%)    LABS:                        6.8    4.31  )-----------( 126      ( 23 May 2020 06:05 )             22.4     05-23    138  |  100  |  38<H>  ----------------------------<  93  4.0   |  26  |  3.9<H>    Ca    7.9<L>      23 May 2020 06:05  Phos  4.5     05-23  Mg     2.0     05-23    TPro  4.6<L>  /  Alb  2.8<L>  /  TBili  0.5  /  DBili  x   /  AST  9   /  ALT  6   /  AlkPhos  50  05-23    PT/INR - ( 23 May 2020 06:05 )   PT: 12.00 sec;   INR: 1.04 ratio      PTT - ( 23 May 2020 06:05 )  PTT:33.0 sec      RADIOLOGY:  No interval imaging performed     PHYSICAL EXAM:  CONSTITUTIONAL: No acute distress, well-developed, well-groomed, AAOx3; pale; Tesio catheter in right subclavian  HEAD: Atraumatic, normocephalic  EYES: EOM intact, PERRLA, conjunctiva and sclera clear  ENT: Supple, no masses, no thyromegaly, no bruits, no JVD; moist mucous membranes  PULMONARY: Clear to auscultation bilaterally; no wheezes, rales, or rhonchi  CARDIOVASCULAR: Regular rate and rhythm; no murmurs, rubs, or gallops  GASTROINTESTINAL: Soft, non-tender, non-distended; bowel sounds present  MUSCULOSKELETAL: 2+ peripheral pulses; no clubbing, no cyanosis, no edema  NEUROLOGY: non-focal  SKIN: No rashes or lesions; warm and dry; no evidence of hematoma or ecchymosis at previous [place] site or renal biopsy site    ASSESSMENT & PLAN  Patient is a 67yo female with PMH of heart failure with reduced ejection fraction, CAD s/p CABG 10/2019, stomach cancer, bladder prolapse, diabetes mellitus, paroxysmal atrial fibrillation, and history of lower GI bleed who presented with abdominal distention for the 2 days' duration which has been worsening. On admission, patient was found to have RIC and was in acute on chronic  heart failure exacerbation in setting of atrial flutter. Patient was admitted to the ICU for acute renal failure and was downgraded on 5/19/2020.    #Acute cardiogenic shock, resolved  #Acute on chronic heart failure exacerbation with reduced ejection fraction secondary to rapid atrial flutter s/p cardioversion 5/15/2020  - Echo 5/17/2020: EF 20-25%, severely decreased LV systolic function, moderate-severe MR, moderate AS, moderate TR, mild pulmonary hypertension, bilateral pleural effusions  - Pro-BNP on admission: 26,469  - Troponin: 0.23->0.23->0.25->0.21  - ECG on admission showed atrial flutter  - Recent outpatient echocardiogram showed EF 45-50% per patient  - EP consult appreciated  - Patient underwent cardioversion on 5/15/2020 after JAMILA showed no intra-cardiac clots  - S/p amiodarone infusion  - Continue with amiodarone 200mg PO QD  - Continue with metoprolol tartrate 25mg PO Q8H  - [id number]: 5; HAS-BLED: 5  - Patient was given warfarin 5mg on 5/22/2020  - Hold anticoagulation for now given acute on chronic anemia  - Heart failure consult appreciated  - Holding carvedilol, Valtessa, Ivabradine, and Entresto for now give RIC on HD  - Patient refusing LifeVest    #Acute on chronic iron deficiency anemia with history of GI bleed 10/2019  - Per patient, outpatient colonoscopy revealed "rectal ulcer"  - Receives IV iron infusions every 2-3 months  - Baseline hemoglobin: 9  - Hemoglobin today: 6.8  - Patient was given warfarin 5mg on 5/22/2020  - Will transfuse 1 unit PRBC slowly given heart failure  - Maintain 2 peripheral 18-gauge IVs  - Keep active type and screen  - Monitor hemoglobin/hematocrit   - Given recent renal biopsy, will order CT abdomen/pelvis with contrast   - NPO for now, holding AC     #RIC on CKD stage 4 currently on hemodialysis T/Th/S  - Secondary to ATN vs AIN (eosinophils in urine) vs cardiorenal (EF severely reduced 45-50%->20-25%)  - Baseline creatinine: 0.9 in 10/2019  - Creatinine today: 3.9  - Patient is anuric and failed furosemide challenge (180mg)  - UA noted for eosinophils, suggestive of interstitial nephritis  - Patient was on ciprofloxacin outpatient for UTI  - Nephrology consult appreciated  - Monitor urine output  - FeNa >1%  - US abdomen: echogenic material around Castellon catheter in urinary bladder  - S/p renal biopsy and Tesio catheter placement by IR 5/21/2020    #CAD s/p CABG 10/2019  - Continue with aspirin 81mg PO QD  - Continue with atorvastatin 40mg PO QHS  - Holding metoprolol tartrate 25mg PO Q8H for hypotension     #UTI in setting of chronic Castellon catheter, resolved  - Urine Cx 5/11/2020: ALLEN  - Completed course of nafcillin    #Left-sided pleural effusion s/p thoracentesis x2, improving  - Thoracentesis done on admission, which drained 300mL of transudative fluid  - Repeat chest X-ray showed re-accumulation of fluid  - Thoracentesis repeated on 5/14/2020, which drained 650mL  - Pulmonology consult appreciated: follow outpatient    #Diabetes mellitus type 2 with history of DKA in 10/2019  - Hemoglobin A1c 5/12/2020: 5.8  - Continue with insulin glargine 9 units SQ QHS  - Hold insulin lispro 3 units SQ TID while NPO  - Insulin sliding scale coverage  - Monitor fingerstick glucose    #History of stomach cancer  - Follow outpatient with oncology    #Bladder prolapse  - Continue with Castellon catheter    #Depression  - Continue with sertraline 25mg PO QD    #Misc  - DVT Prophylaxis: SCDs   - GI Prophylaxis: pantoprazole 40mg PO QD   - Diet:  NPO except medications  - Activity: ambulate as tolerated  - IV Fluids: not indicated  - Code Status: Full Code    Dispo: acute

## 2020-05-23 NOTE — CONSULT NOTE ADULT - PROVIDER SPECIALTY LIST ADULT
Intervent Radiology
Podiatry
Electrophysiology
Heme/Onc
Nephrology
Physiatry
Critical Care
Urology
Heart Failure

## 2020-05-23 NOTE — PROGRESS NOTE ADULT - ATTENDING COMMENTS
Patient seen and examined. Patient had episodes of hypotension after HD yesterday and CBC significant for worsening anemia. Patient is asymptomatic. No back pain, abd pain, dizziness today. Will transfuse one unit pRBC, trend CBC, CTA abd/pelvis, and NPO for possible IR intervention. Holding metoprolol for hypotension, patient in NSR.     #Progress Note Handoff  Pending (specify):  anemia, r/o RP bleed   Family discussion: jack pt plan as above   Disposition: Home

## 2020-05-23 NOTE — CONSULT NOTE ADULT - PROBLEM SELECTOR RECOMMENDATION 9
Continue to monitor Hgb/Hct and transfuse as needed  Bedrest with Abdominal binder  No Anticoagulation  Repeat CTscan A/P in 48 hrs  No Acute  intervention at this time  Will d/w Attending Continue to monitor Hgb/Hct and transfuse as needed  Bedrest with Abdominal binder  No Anticoagulation  Repeat CT scan in 48 hours  Monitor h/h and if patient continues to need transfusion, will need IR embolization.  Repeat CTscan A/P in 48 hrs  No Acute  intervention at this time  Will d/w Attending

## 2020-05-23 NOTE — PROGRESS NOTE ADULT - SUBJECTIVE AND OBJECTIVE BOX
SUBJ:      MEDICATIONS  (STANDING):  aMIOdarone    Tablet 200 milliGRAM(s) Oral daily  ascorbic acid 500 milliGRAM(s) Oral daily  aspirin  chewable 81 milliGRAM(s) Oral daily  atorvastatin 40 milliGRAM(s) Oral at bedtime  chlorhexidine 4% Liquid 1 Application(s) Topical <User Schedule>  insulin glargine Injectable (LANTUS) 9 Unit(s) SubCutaneous at bedtime  insulin lispro Injectable (HumaLOG) 3 Unit(s) SubCutaneous three times a day before meals  lactobacillus acidophilus 1 Tablet(s) Oral three times a day  melatonin 3 milliGRAM(s) Oral at bedtime  pantoprazole    Tablet 40 milliGRAM(s) Oral before breakfast  sertraline 25 milliGRAM(s) Oral daily  sodium chloride 0.9% Bolus 250 milliLiter(s) IV Bolus once  tiotropium 18 MICROgram(s) Capsule 1 Capsule(s) Inhalation daily    MEDICATIONS  (PRN):  acetaminophen   Tablet .. 650 milliGRAM(s) Oral every 6 hours PRN Moderate Pain (4 - 6)  aluminum hydroxide/magnesium hydroxide/simethicone Suspension 15 milliLiter(s) Oral once PRN Dyspepsia  guaifenesin/dextromethorphan  Syrup 10 milliLiter(s) Oral every 6 hours PRN Cough  loperamide 2 milliGRAM(s) Oral every 4 hours PRN Diarrhea            Vital Signs Last 24 Hrs  T(C): 36.6 (23 May 2020 10:00), Max: 36.7 (23 May 2020 09:35)  T(F): 97.9 (23 May 2020 10:00), Max: 98 (23 May 2020 09:35)  HR: 85 (23 May 2020 10:00) (83 - 99)  BP: 104/57 (23 May 2020 10:00) (67/40 - 104/57)  BP(mean): --  RR: 18 (23 May 2020 10:00) (18 - 18)  SpO2: 97% (23 May 2020 08:24) (88% - 99%)     REVIEW OF SYSTEMS:  CONSTITUTIONAL: No fever, weight loss, or fatigue  CARDIOLOGY: PAtient denies chest pain, shortness of breath or syncopal episodes.   RESPIRATORY: denies shortness of breath, wheezeing.   NEUROLOGICAL: NO weakness, no focal deficits to report.  ENDOCRINOLOGICAL: no recent change in diabetic medications.   GI: no BRBPR, no N,V,diarrhea.    PSYCHIATRY: normal mood and affect  HEENT: no nasal discharge, no ecchymosis  SKIN: no ecchymosis, no breakdown  MUSCULOSKELETAL: Full range of motion x4.        PHYSICAL EXAM:  · CONSTITUTIONAL:	Well-developed, well nourished    BMI-  ·RESPIRATORY:   airway patent; breath sounds equal; good air movement; respirations non-labored; clear to auscultation bilaterally; no chest wall tenderness; no intercostal retractions; no rales,rhonchi or wheeze  · CARDIOVASCULAR	regular rate and rhythm  no rub  no murmur  normal PMI  · EXTREMITIES: No cyanosis, clubbing or edema  · VASCULAR: 	Equal and normal pulses (carotid, femoral, dorsalis pedis)  	  TELEMETRY:    ECG:    TTE:    LABS:                        6.8    4.31  )-----------( 126      ( 23 May 2020 06:05 )             22.4     05-23    138  |  100  |  38<H>  ----------------------------<  93  4.0   |  26  |  3.9<H>    Ca    7.9<L>      23 May 2020 06:05  Phos  4.5     05-23  Mg     2.0     05-23    TPro  4.6<L>  /  Alb  2.8<L>  /  TBili  0.5  /  DBili  x   /  AST  9   /  ALT  6   /  AlkPhos  50  05-23        PT/INR - ( 23 May 2020 06:05 )   PT: 12.00 sec;   INR: 1.04 ratio         PTT - ( 23 May 2020 06:05 )  PTT:33.0 sec    I&O's Summary    22 May 2020 07:01  -  23 May 2020 07:00  --------------------------------------------------------  IN: 640 mL / OUT: 125 mL / NET: 515 mL    23 May 2020 07:01  -  23 May 2020 13:06  --------------------------------------------------------  IN: 175 mL / OUT: 0 mL / NET: 175 mL      BNP  RADIOLOGY & ADDITIONAL STUDIES:    IMPRESSION AND PLAN:    post transfusion  continue with current manmagement

## 2020-05-23 NOTE — CONSULT NOTE ADULT - SUBJECTIVE AND OBJECTIVE BOX
70 y/o female with pmh of DM, stomach ca, bladder prolapse, heart failure, s/p CABG 10/2019 presents complaining of abdominal distension for the past couple of days which has been worsening.  Patient states that her symptoms are similar to when she has urinary tract infections. She has had two other episodes of similar presenting symptoms since having an indwelling montejo catheter placed after her CABG (10/2019) as mentioned.  Patient was treated with cipro as outpt; reports decreased PO fluids since the symptoms began.  Patient denies fever/chills, abd pain, n/v/d, cough, chest pain, SOB. (12 May 2020 00:57)    Pt is s/p Right Renal Bx by IR on 5/21/20. CT scan today showed Right renal hematoma. We are consulted for evaluation/treatment and plan      PAST MEDICAL & SURGICAL HISTORY:  Heart failure  Female bladder prolapse  Diabetes  S/P CABG (coronary artery bypass graft)  History of repair of hip fracture      REVIEW OF SYSTEMS:    CONSTITUTIONAL:  fevers or chills  HEENT: No visual changes  ENDO: No sweating  NECK: No pain or stiffness  MUSCULOSKELETAL: No back pain, no joint pain  RESPIRATORY: No shortness of breath  CARDIOVASCULAR: No chest pain  GASTROINTESTINAL: No abdominal or epigastric pain. No nausea, vomiting,  No diarrhea or constipation.   NEUROLOGICAL: No mental status changes  PSYCH: No depression, no mood changes  SKIN: No itching      MEDICATIONS  (STANDING):  aMIOdarone    Tablet 200 milliGRAM(s) Oral daily  ascorbic acid 500 milliGRAM(s) Oral daily  aspirin  chewable 81 milliGRAM(s) Oral daily  atorvastatin 40 milliGRAM(s) Oral at bedtime  chlorhexidine 4% Liquid 1 Application(s) Topical <User Schedule>  insulin glargine Injectable (LANTUS) 9 Unit(s) SubCutaneous at bedtime  insulin lispro Injectable (HumaLOG) 3 Unit(s) SubCutaneous three times a day before meals  lactobacillus acidophilus 1 Tablet(s) Oral three times a day  melatonin 3 milliGRAM(s) Oral at bedtime  pantoprazole    Tablet 40 milliGRAM(s) Oral before breakfast  sertraline 25 milliGRAM(s) Oral daily  sodium chloride 0.9% Bolus 250 milliLiter(s) IV Bolus once  tiotropium 18 MICROgram(s) Capsule 1 Capsule(s) Inhalation daily    MEDICATIONS  (PRN):  acetaminophen   Tablet .. 650 milliGRAM(s) Oral every 6 hours PRN Moderate Pain (4 - 6)  aluminum hydroxide/magnesium hydroxide/simethicone Suspension 15 milliLiter(s) Oral once PRN Dyspepsia  guaifenesin/dextromethorphan  Syrup 10 milliLiter(s) Oral every 6 hours PRN Cough  loperamide 2 milliGRAM(s) Oral every 4 hours PRN Diarrhea      Allergies    No Known Allergies          SOCIAL HISTORY: No illicit drug use    FAMILY HISTORY:  FH: myocardial infarction (Mother)  FH: stomach cancer (Mother)      Vital Signs Last 24 Hrs  T(C): 36.6 (23 May 2020 20:23), Max: 36.7 (23 May 2020 09:35)  T(F): 97.8 (23 May 2020 20:23), Max: 98.1 (23 May 2020 13:23)  HR: 102 (23 May 2020 20:23) (83 - 102)  BP: 122/63 (23 May 2020 20:23) (78/40 - 122/67)  BP(mean): --  RR: 16 (23 May 2020 20:23) (16 - 18)  SpO2: 97% (23 May 2020 08:24) (97% - 97%)    PHYSICAL EXAM:    Constitutional: NAD, well-developed  HEENT: EOMI  Neck: no pain  Back: No CVA tenderness, no flank hematoma or tenderness  Respiratory: No accessory respiratory muscle use  Abd: Soft, NT/ND  no organomegally  no hernia  : Montejo catheter in place, urine blood tinged pink   Extremities: no edema  Neurological: A/O x 3  Psychiatric: Normal mood, normal affect  Skin: No rashes    I&O's Summary    22 May 2020 07:01  -  23 May 2020 07:00  --------------------------------------------------------  IN: 640 mL / OUT: 125 mL / NET: 515 mL    23 May 2020 07:01  -  23 May 2020 23:33  --------------------------------------------------------  IN: 482 mL / OUT: 100 mL / NET: 382 mL        LABS:                        8.9    6.32  )-----------( 145      ( 23 May 2020 17:37 )             28.1     05-23    138  |  100  |  38<H>  ----------------------------<  93  4.0   |  26  |  3.9<H>    Ca    7.9<L>      23 May 2020 06:05  Phos  4.5     05-23  Mg     2.0     05-23    TPro  4.6<L>  /  Alb  2.8<L>  /  TBili  0.5  /  DBili  x   /  AST  9   /  ALT  6   /  AlkPhos  50  05-23    PT/INR - ( 23 May 2020 06:05 )   PT: 12.00 sec;   INR: 1.04 ratio         PTT - ( 23 May 2020 06:05 )  PTT:33.0 sec            RADIOLOGY & ADDITIONAL STUDIES:

## 2020-05-23 NOTE — PROGRESS NOTE ADULT - SUBJECTIVE AND OBJECTIVE BOX
[place] [facility] FOLLOW UP NOTE  --------------------------------------------------------------------------------  pt seen and examined  last HD yesterday  h/h lower  no active bleeding  hypotensive    PAST HISTORY  --------------------------------------------------------------------------------  No significant changes to PMH, PSH, FHx, SHx, unless otherwise noted    ALLERGIES & MEDICATIONS  --------------------------------------------------------------------------------  Allergies    No Known Allergies    Intolerances      Physical Exam:  	Gen: NAD  	Pulm: CTA B/L  	CV: RRR, S1S2  	Abd: +BS, soft, nontender/nondistended  	: No suprapubic tenderness  	LE: Warm, FROM, no clubbing, intact strength; no edema  	Vascular access:    Hospital Medications:   MEDICATIONS  (STANDING):  aMIOdarone    Tablet 200 milliGRAM(s) Oral daily  ascorbic acid 500 milliGRAM(s) Oral daily  aspirin  chewable 81 milliGRAM(s) Oral daily  atorvastatin 40 milliGRAM(s) Oral at bedtime  chlorhexidine 4% Liquid 1 Application(s) Topical <User Schedule>  insulin glargine Injectable (LANTUS) 9 Unit(s) SubCutaneous at bedtime  insulin lispro Injectable (HumaLOG) 3 Unit(s) SubCutaneous three times a day before meals  lactobacillus acidophilus 1 Tablet(s) Oral three times a day  melatonin 3 milliGRAM(s) Oral at bedtime  pantoprazole    Tablet 40 milliGRAM(s) Oral before breakfast  sertraline 25 milliGRAM(s) Oral daily  sodium chloride 0.9% Bolus 250 milliLiter(s) IV Bolus once  tiotropium 18 MICROgram(s) Capsule 1 Capsule(s) Inhalation daily        VITALS:  T(F): 98.1 (05-23-20 @ 13:23), Max: 98.1 (05-23-20 @ 13:23)  HR: 91 (05-23-20 @ 13:23)  BP: 122/67 (05-23-20 @ 13:23)  RR: 18 (05-23-20 @ 13:23)  SpO2: 97% (05-23-20 @ 08:24)  Wt(kg): --    05-21 @ 07:01  -  05-22 @ 07:00  --------------------------------------------------------  IN: 0 mL / OUT: 75 mL / NET: -75 mL    05-22 @ 07:01  -  05-23 @ 07:00  --------------------------------------------------------  IN: 640 mL / OUT: 125 mL / NET: 515 mL    05-23 @ 07:01  -  05-23 @ 13:52  --------------------------------------------------------  IN: 175 mL / OUT: 0 mL / NET: 175 mL          LABS:  05-23    138  |  100  |  38<H>  ----------------------------<  93  4.0   |  26  |  3.9<H>    Ca    7.9<L>      23 May 2020 06:05  Phos  4.5     05-23  Mg     2.0     05-23    TPro  4.6<L>  /  Alb  2.8<L>  /  TBili  0.5  /  DBili      /  AST  9   /  ALT  6   /  AlkPhos  50  05-23                          6.8    4.31  )-----------( 126      ( 23 May 2020 06:05 )             22.4       Urine Studies:        RADIOLOGY & ADDITIONAL STUDIES:

## 2020-05-23 NOTE — PROGRESS NOTE ADULT - ASSESSMENT
RIC on CKD on HD  s/p renal biopsy  s/p tunneled HD catheter placement  hypotension  cardiogenic shock  afib  acute on chronic systolic chf  cad / cabg  s/p montejo  anemia  hx of gi bleed  DM    plan:    1U PRBC transfusion slowly  f/u H/H  CT abd pending  next HD Monday  f/u renal biopsy path  lantus / humalog  f/u cardiology  d/w resident  if condition worsens --> transfer to unit

## 2020-05-24 NOTE — PROGRESS NOTE ADULT - ASSESSMENT
Patient is a 69yo female with PMH of heart failure with reduced ejection fraction, CAD s/p CABG 10/2019, stomach cancer, bladder prolapse, diabetes mellitus, paroxysmal atrial fibrillation, and history of lower GI bleed who presented with abdominal distention for the 2 days' duration which has been worsening. On admission, patient was found to have RIC and was in acute on chronic  heart failure exacerbation in setting of atrial flutter. Patient was admitted to the ICU for acute renal failure and was downgraded on 5/19/2020.    #Right kidney hematoma   - s/p renal biopsy 5/21, c/b by perinephric hematoma   - s/p one unit of pRBC yesterday, vitals stable, hgb stable  - holding AC, appreciate urology input   - c/w abd binder  - monitor montejo for hematuria     #Acute cardiogenic shock, resolved  #Acute on chronic heart failure exacerbation with reduced ejection fraction secondary to rapid atrial flutter s/p cardioversion 5/15/2020  - Echo 5/17/2020: EF 20-25%, severely decreased LV systolic function, moderate-severe MR, moderate AS, moderate TR, mild pulmonary hypertension, bilateral pleural effusions  - Pro-BNP on admission: 26,469  - Troponin: 0.23->0.23->0.25->0.21  - ECG on admission showed atrial flutter  - Recent outpatient echocardiogram showed EF 45-50% per patient  - EP consult appreciated  - Patient underwent cardioversion on 5/15/2020 after JAMILA showed no intra-cardiac clots  - S/p amiodarone infusion  - Continue with amiodarone 200mg PO QD  - Restarted metoprolol tartrate 25mg PO Q8H  - [id number]: 5; HAS-BLED: 5  - Patient was given warfarin 5mg on 5/22/2020  - Hold anticoagulation for now given acute on chronic anemia  - Heart failure consult appreciated  - Holding carvedilol, Valtessa, Ivabradine, and Entresto for now give RIC on HD  - Patient refusing LifeVest    #Acute on chronic iron deficiency anemia 2/2 nephric hematoma s/p biopsy   - Per patient, outpatient colonoscopy revealed "rectal ulcer"  - Receives IV iron infusions every 2-3 months  - Patient was given warfarin 5mg on 5/22/2020  - s/p 1 unit PRBC slowly given heart failure  - Maintain 2 peripheral 18-gauge IVs  - Keep active type and screen  - Monitor hemoglobin/hematocrit q daily   - holding AC     #RIC on CKD stage 4 currently on hemodialysis T/Th/S  - Secondary to ATN vs AIN (eosinophils in urine) vs cardiorenal (EF severely reduced 45-50%->20-25%)  - Baseline creatinine: 0.9 in 10/2019  - Patient is anuric and failed furosemide challenge (180mg)  - UA noted for eosinophils, suggestive of interstitial nephritis  - Patient was on ciprofloxacin outpatient for UTI  - Nephrology consult appreciated  - Monitor urine output  - FeNa >1%  - US abdomen: echogenic material around Montejo catheter in urinary bladder  - S/p renal biopsy and Tesio catheter placement by IR 5/21/2020    #CAD s/p CABG 10/2019  - Continue with aspirin 81mg PO QD  - Continue with atorvastatin 40mg PO QHS  - restarted metoprolol tartrate 25mg PO Q8H      #UTI in setting of chronic Montejo catheter, resolved  - Urine Cx 5/11/2020: ALLEN  - Completed course of nafcillin    #Left-sided pleural effusion s/p thoracentesis x2, improving  - Thoracentesis done on admission, which drained 300mL of transudative fluid  - Repeat chest X-ray showed re-accumulation of fluid  - Thoracentesis repeated on 5/14/2020, which drained 650mL  - Pulmonology consult appreciated: follow outpatient    #Diabetes mellitus type 2 with history of DKA in 10/2019  - Hemoglobin A1c 5/12/2020: 5.8  - Continue with insulin glargine 9 units SQ QHS  - Hold insulin lispro 3 units SQ TID while NPO  - Insulin sliding scale coverage  - Monitor fingerstick glucose    #History of stomach cancer  - Follow outpatient with oncology    #Bladder prolapse  - Continue with Montejo catheter    #Depression  - Continue with sertraline 25mg PO QD    #Misc  - DVT Prophylaxis: SCDs   - GI Prophylaxis: pantoprazole 40mg PO QD   - Diet:  Renal   - Code Status: Full Code    #Progress Note Handoff  Pending (specify):  trend cbc, repeat CT abd in 24 hrs   Family discussion: dw pt and daughter plan as above   Disposition: Home

## 2020-05-24 NOTE — PROGRESS NOTE ADULT - SUBJECTIVE AND OBJECTIVE BOX
Pt seen and examined at bedside. Reports left arm swelling, no IV was in that arm. No other complaints. Denies dizziness, back pain. Castellon with hematuria.     VITAL SIGNS (Last 24 hrs):  T(C): 35.9 (20 @ 05:21), Max: 36.7 (20 @ 13:23)  HR: 97 (20 @ 09:27) (91 - 102)  BP: 119/74 (20 @ 09:27) (119/74 - 124/67)  RR: 19 (20 @ 05:21) (16 - 19)  SpO2: 95% (20 @ 20:10) (95% - 95%)  Wt(kg): --  Daily     Daily Weight in k.6 (24 May 2020 05:21)    I&O's Summary    23 May 2020 07:  -  24 May 2020 07:00  --------------------------------------------------------  IN: 482 mL / OUT: 100 mL / NET: 382 mL    24 May 2020 07:  -  24 May 2020 11:42  --------------------------------------------------------  IN: 250 mL / OUT: 0 mL / NET: 250 mL        PHYSICAL EXAM:  GENERAL: NAD, chronically ill appearing   HEAD:  Atraumatic, Normocephalic  EYES: EOMI, PERRLA, conjunctiva and sclera clear  NECK: Supple, No JVD  CHEST/LUNG: Clear to auscultation bilaterally; No wheeze +Tesio   HEART: Regular rate and rhythm; No murmurs, rubs, or gallops  ABDOMEN: Soft, Nontender, Nondistended; Bowel sounds present  : Castellon with hematuria   EXTREMITIES:  2+ Peripheral Pulses, No clubbing, cyanosis, or edema  PSYCH: AAOx3  SKIN: No rashes or lesions    Labs Reviewed  Spoke to patient and her daughter in regards to abnormal labs.    CBC Full  -  ( 24 May 2020 05:09 )  WBC Count : 6.17 K/uL  Hemoglobin : 8.2 g/dL  Hematocrit : 25.7 %  Platelet Count - Automated : 151 K/uL  Mean Cell Volume : 90.2 fL  Mean Cell Hemoglobin : 28.8 pg  Mean Cell Hemoglobin Concentration : 31.9 g/dL  Auto Neutrophil # : 4.23 K/uL  Auto Lymphocyte # : 0.88 K/uL  Auto Monocyte # : 0.88 K/uL  Auto Eosinophil # : 0.10 K/uL  Auto Basophil # : 0.01 K/uL  Auto Neutrophil % : 68.5 %  Auto Lymphocyte % : 14.3 %  Auto Monocyte % : 14.3 %  Auto Eosinophil % : 1.6 %  Auto Basophil % : 0.2 %    BMP:     @ 05:09    Blood Urea Nitrogen - 50  Calcium - 8.3  Carbond Dioxide - 24  Chloride - 101  Creatinine - 4.7  Glucose - 98  Potassium - 4.6  Sodium - 139      Hemoglobin A1c -   PT/INR - ( 24 May 2020 05:09 )   PT: 14.10 sec;   INR: 1.23 ratio         PTT - ( 24 May 2020 05:09 )  PTT:34.1 sec     Tele Reviewed    MEDICATIONS  (STANDING):  aMIOdarone    Tablet 200 milliGRAM(s) Oral daily  ascorbic acid 500 milliGRAM(s) Oral daily  aspirin  chewable 81 milliGRAM(s) Oral daily  atorvastatin 40 milliGRAM(s) Oral at bedtime  chlorhexidine 4% Liquid 1 Application(s) Topical <User Schedule>  insulin glargine Injectable (LANTUS) 9 Unit(s) SubCutaneous at bedtime  insulin lispro Injectable (HumaLOG) 3 Unit(s) SubCutaneous three times a day before meals  lactobacillus acidophilus 1 Tablet(s) Oral three times a day  melatonin 3 milliGRAM(s) Oral at bedtime  metoprolol tartrate 25 milliGRAM(s) Oral every 8 hours  pantoprazole    Tablet 40 milliGRAM(s) Oral before breakfast  sertraline 25 milliGRAM(s) Oral daily  sodium chloride 0.9% Bolus 250 milliLiter(s) IV Bolus once  tiotropium 18 MICROgram(s) Capsule 1 Capsule(s) Inhalation daily    MEDICATIONS  (PRN):  acetaminophen   Tablet .. 650 milliGRAM(s) Oral every 6 hours PRN Moderate Pain (4 - 6)  aluminum hydroxide/magnesium hydroxide/simethicone Suspension 15 milliLiter(s) Oral once PRN Dyspepsia  guaifenesin/dextromethorphan  Syrup 10 milliLiter(s) Oral every 6 hours PRN Cough  loperamide 2 milliGRAM(s) Oral every 4 hours PRN Diarrhea

## 2020-05-24 NOTE — PROGRESS NOTE ADULT - SUBJECTIVE AND OBJECTIVE BOX
[place] [facility] FOLLOW UP NOTE  --------------------------------------------------------------------------------  pt seen and examined  last HD yon Fri  s/p PRBC transfusion  renal hematoma on CT imaging  c/o left arm swelling  + hematuria  no dyspnea    PAST HISTORY  --------------------------------------------------------------------------------  No significant changes to PMH, PSH, FHx, SHx, unless otherwise noted    ALLERGIES & MEDICATIONS  --------------------------------------------------------------------------------  Allergies    No Known Allergies    Intolerances    ROS:  as above, all else negative    advance care planning and advance directives reviewed and discussed in detail    Physical Exam:  	Gen: NAD  	Pulm: CTA B/L  	CV: RRR, S1S2  	Abd: +BS, soft, nontender/nondistended  	: No suprapubic tenderness  	LE: Warm, FROM, no clubbing, intact strength; no edema + LUE edema  	Vascular access: + tesio              + montejo with hematuria    Hospital Medications:   MEDICATIONS  (STANDING):  aMIOdarone    Tablet 200 milliGRAM(s) Oral daily  ascorbic acid 500 milliGRAM(s) Oral daily  aspirin  chewable 81 milliGRAM(s) Oral daily  atorvastatin 40 milliGRAM(s) Oral at bedtime  chlorhexidine 4% Liquid 1 Application(s) Topical <User Schedule>  insulin glargine Injectable (LANTUS) 9 Unit(s) SubCutaneous at bedtime  insulin lispro Injectable (HumaLOG) 3 Unit(s) SubCutaneous three times a day before meals  lactobacillus acidophilus 1 Tablet(s) Oral three times a day  melatonin 3 milliGRAM(s) Oral at bedtime  metoprolol tartrate 25 milliGRAM(s) Oral every 8 hours  pantoprazole    Tablet 40 milliGRAM(s) Oral before breakfast  sertraline 25 milliGRAM(s) Oral daily  sodium chloride 0.9% Bolus 250 milliLiter(s) IV Bolus once  tiotropium 18 MICROgram(s) Capsule 1 Capsule(s) Inhalation daily        VITALS:  T(F): 96.7 (05-24-20 @ 05:21), Max: 98.1 (05-23-20 @ 13:23)  HR: 97 (05-24-20 @ 09:27)  BP: 119/74 (05-24-20 @ 09:27)  RR: 19 (05-24-20 @ 05:21)  SpO2: 95% (05-23-20 @ 20:10)  Wt(kg): --    05-22 @ 07:01  -  05-23 @ 07:00  --------------------------------------------------------  IN: 640 mL / OUT: 125 mL / NET: 515 mL    05-23 @ 07:01  -  05-24 @ 07:00  --------------------------------------------------------  IN: 482 mL / OUT: 100 mL / NET: 382 mL    05-24 @ 07:01  -  05-24 @ 13:21  --------------------------------------------------------  IN: 250 mL / OUT: 0 mL / NET: 250 mL          LABS:  05-24    139  |  101  |  50<H>  ----------------------------<  98  4.6   |  24  |  4.7<HH>    Ca    8.3<L>      24 May 2020 05:09  Phos  4.9     05-24  Mg     2.0     05-24    TPro  5.0<L>  /  Alb  3.0<L>  /  TBili  0.5  /  DBili      /  AST  10  /  ALT  6   /  AlkPhos  54  05-24                          8.2    6.17  )-----------( 151      ( 24 May 2020 05:09 )             25.7       Urine Studies:        RADIOLOGY & ADDITIONAL STUDIES:

## 2020-05-24 NOTE — PROGRESS NOTE ADULT - PROBLEM SELECTOR PLAN 1
Continue monitor hgb (8.2, 8.1 previously)  Serial h/h  consider stopping ASA  No urologic intervention at this time.  Consider IR consult if there is evidence of recurrent bleeding.

## 2020-05-24 NOTE — PROGRESS NOTE ADULT - ASSESSMENT
RIC on CKD on HD  s/p renal biopsy  right renal hematoma  s/p tunneled HD catheter placement  hypotension  cardiogenic shock - better  afib  acute on chronic systolic chf  cad / cabg  chronic montejo x 7 months  anemia  hx of gi bleed  DM  left arm swelling - r/o DVT      plan:    next HD tomorrow - 1.5Kg removal  Hd via tesio catheter  f/u renal biopsy path  add retacrit 10,000U IV QHD  trend h/h and PRN PRBC transfusion  check left arm duplex  cont montejo - chronic, monitor for hematuria  lantus / humalog  f/u cardio  d/w nursing  full code

## 2020-05-25 NOTE — PROGRESS NOTE ADULT - ASSESSMENT
RIC on CKD on HD  s/p renal biopsy  right renal hematoma  s/p tunneled HD catheter placement  hypotension  afib  acute on chronic systolic chf  cad / cabg  chronic montejo x 7 months  anemia  hx of gi bleed  DM  left arm swelling - r/o DVT      plan:    HD today - 1.5Kg removal  Hd via tesio catheter  f/u renal biopsy path  f/u repeat ct abd  cont retacrit 10,000U IV QHD  trend h/h and PRN PRBC transfusion  check left arm duplex  cont montejo - chronic, monitor for hematuria  lantus / humalog  f/u cardio  d/w hospitalist    addendum:  pt seen again on HD.  BP's low.  Will try for goal UF as bp allows

## 2020-05-25 NOTE — PROGRESS NOTE ADULT - SUBJECTIVE AND OBJECTIVE BOX
SUBJECTIVE:    Patient is a 69y old Female who presents with a chief complaint of sob (25 May 2020 12:28)  Currently admitted to medicine with the primary diagnosis of CHF (congestive heart failure)     Today is hospital day 14d. This morning she is resting comfortably in bed and reports no new issues or overnight events.     PAST MEDICAL & SURGICAL HISTORY  Heart failure  Female bladder prolapse  Diabetes  S/P CABG (coronary artery bypass graft)  History of repair of hip fracture    SOCIAL HISTORY:  Negative for smoking/alcohol/drug use.     ALLERGIES:  No Known Allergies    MEDICATIONS:  STANDING MEDICATIONS  acetaminophen   Tablet .. 650 milliGRAM(s) Oral every 6 hours PRN Moderate Pain (4 - 6)  aluminum hydroxide/magnesium hydroxide/simethicone Suspension 15 milliLiter(s) Oral once PRN Dyspepsia  aMIOdarone    Tablet 200 milliGRAM(s) Oral daily  ascorbic acid 500 milliGRAM(s) Oral daily  aspirin  chewable 81 milliGRAM(s) Oral daily  atorvastatin 40 milliGRAM(s) Oral at bedtime  chlorhexidine 4% Liquid 1 Application(s) Topical <User Schedule>  dextrose 40% Gel 15 Gram(s) Oral once PRN Blood Glucose LESS THAN 70 milliGRAM(s)/deciliter  dextrose 50% Injectable 12.5 Gram(s) IV Push once  dextrose 50% Injectable 25 Gram(s) IV Push once  dextrose 50% Injectable 25 Gram(s) IV Push once  epoetin-mana-epbx (RETACRIT) Injectable 22164 Unit(s) IV Push <User Schedule>  glucagon  Injectable 1 milliGRAM(s) IntraMuscular once PRN Glucose LESS THAN 70 milligrams/deciliter  guaifenesin/dextromethorphan  Syrup 10 milliLiter(s) Oral every 6 hours PRN Cough  insulin glargine Injectable (LANTUS) 5 Unit(s) SubCutaneous at bedtime  insulin lispro (HumaLOG) corrective regimen sliding scale   SubCutaneous three times a day before meals  lactobacillus acidophilus 1 Tablet(s) Oral three times a day  loperamide 2 milliGRAM(s) Oral every 4 hours PRN Diarrhea  melatonin 3 milliGRAM(s) Oral at bedtime  metoprolol tartrate 25 milliGRAM(s) Oral every 8 hours  pantoprazole    Tablet 40 milliGRAM(s) Oral before breakfast  sertraline 25 milliGRAM(s) Oral daily  sodium chloride 0.9% Bolus 250 milliLiter(s) IV Bolus once  tiotropium 18 MICROgram(s) Capsule 1 Capsule(s) Inhalation daily    PRN MEDICATIONS  acetaminophen   Tablet .. 650 milliGRAM(s) Oral every 6 hours PRN  aluminum hydroxide/magnesium hydroxide/simethicone Suspension 15 milliLiter(s) Oral once PRN  dextrose 40% Gel 15 Gram(s) Oral once PRN  glucagon  Injectable 1 milliGRAM(s) IntraMuscular once PRN  guaifenesin/dextromethorphan  Syrup 10 milliLiter(s) Oral every 6 hours PRN  loperamide 2 milliGRAM(s) Oral every 4 hours PRN    VITALS:   T(F): 97  HR: 92 (70 - 92)  BP: 107/58 (107/58 - 146/69)  RR: 18 (18 - 18)  SpO2: 96% (96% - 96%)    LABS:                        8.2    6.32  )-----------( 171      ( 25 May 2020 06:46 )             26.2     05-25    136  |  100  |  62<HH>  ----------------------------<  68<L>  5.0   |  24  |  5.2<HH>    Ca    8.9      25 May 2020 06:46  Phos  5.8     05-25  Mg     2.2     05-25    TPro  5.1<L>  /  Alb  3.0<L>  /  TBili  0.3  /  DBili  x   /  AST  10  /  ALT  5   /  AlkPhos  53  05-25    PT/INR - ( 25 May 2020 06:46 )   PT: 13.00 sec;   INR: 1.13 ratio         PTT - ( 25 May 2020 06:46 )  PTT:33.1 sec    PHYSICAL EXAM:  GEN: In no acute distress. Pt. is sleeping in bed comfortably, able to be aroused easily.   LUNGS: CTABL, Symmetrical inspiration, no increased work of breathing  HEART: +S1,S2, RRR, No murmurs, Rubs, Gallops   ABD: Bowel Sounds Present, Soft, non tender, non distended, no guarding, no rebound.   EXT: 2+ peripheral Pulses, no clubbing, no cyanosis, no Edema.   NEURO: AAOX3. No focal deficits.

## 2020-05-25 NOTE — PROGRESS NOTE ADULT - ASSESSMENT
Pt is a 69y Female s/p R renal biopsy with subsequent development of hematoma s/p 1 PRBC, stable.    ·	no acute urologic intervention  ·	continue to monitor H/H, transfuse prn  ·	Dr Summers at bedside

## 2020-05-25 NOTE — PROGRESS NOTE ADULT - SUBJECTIVE AND OBJECTIVE BOX
[facility] FOLLOW UP NOTE  --------------------------------------------------------------------------------  pt seen on HD  s/p PRBC transfusion  renal hematoma on CT imaging  + hematuria resolving  no dyspnea    PAST HISTORY  --------------------------------------------------------------------------------  No significant changes to PMH, PSH, FHx, SHx, unless otherwise noted    ALLERGIES & MEDICATIONS  --------------------------------------------------------------------------------  Allergies    No Known Allergies    Intolerances    ROS:  as above, all else negative    advance care planning and advance directives reviewed and discussed in detail    Physical Exam:  	Gen: NAD  	Pulm: CTA B/L  	CV: RRR, S1S2  	Abd: +BS, soft, nontender/nondistended  	: No suprapubic tenderness  	LE: Warm, FROM, no clubbing, intact strength; no edema + LUE edema  	Vascular access: + tesio              + montejo with hematuria    Hospital Medications:   MEDICATIONS  (STANDING):  aMIOdarone    Tablet 200 milliGRAM(s) Oral daily  ascorbic acid 500 milliGRAM(s) Oral daily  aspirin  chewable 81 milliGRAM(s) Oral daily  atorvastatin 40 milliGRAM(s) Oral at bedtime  chlorhexidine 4% Liquid 1 Application(s) Topical <User Schedule>  dextrose 50% Injectable 12.5 Gram(s) IV Push once  dextrose 50% Injectable 25 Gram(s) IV Push once  dextrose 50% Injectable 25 Gram(s) IV Push once  epoetin-mana-epbx (RETACRIT) Injectable 37245 Unit(s) IV Push <User Schedule>  insulin glargine Injectable (LANTUS) 5 Unit(s) SubCutaneous at bedtime  insulin lispro (HumaLOG) corrective regimen sliding scale   SubCutaneous three times a day before meals  lactobacillus acidophilus 1 Tablet(s) Oral three times a day  melatonin 3 milliGRAM(s) Oral at bedtime  metoprolol tartrate 25 milliGRAM(s) Oral every 8 hours  pantoprazole    Tablet 40 milliGRAM(s) Oral before breakfast  sertraline 25 milliGRAM(s) Oral daily  sodium chloride 0.9% Bolus 250 milliLiter(s) IV Bolus once  tiotropium 18 MICROgram(s) Capsule 1 Capsule(s) Inhalation daily        VITALS:  T(F): 96.9 (05-25-20 @ 04:56), Max: 97.6 (05-24-20 @ 13:52)  HR: 70 (05-25-20 @ 11:34)  BP: 125/68 (05-25-20 @ 11:34)  RR: 18 (05-25-20 @ 11:34)  SpO2: 96% (05-25-20 @ 08:15)  Wt(kg): --    05-23 @ 07:01  -  05-24 @ 07:00  --------------------------------------------------------  IN: 482 mL / OUT: 100 mL / NET: 382 mL    05-24 @ 07:01  -  05-25 @ 07:00  --------------------------------------------------------  IN: 600 mL / OUT: 25 mL / NET: 575 mL    05-25 @ 07:01  -  05-25 @ 12:29  --------------------------------------------------------  IN: 200 mL / OUT: 1500 mL / NET: -1300 mL          LABS:  05-25    136  |  100  |  62<HH>  ----------------------------<  68<L>  5.0   |  24  |  5.2<HH>    Ca    8.9      25 May 2020 06:46  Phos  5.8     05-25  Mg     2.2     05-25    TPro  5.1<L>  /  Alb  3.0<L>  /  TBili  0.3  /  DBili      /  AST  10  /  ALT  5   /  AlkPhos  53  05-25                          8.2    6.32  )-----------( 171      ( 25 May 2020 06:46 )             26.2       Urine Studies:        RADIOLOGY & ADDITIONAL STUDIES:

## 2020-05-25 NOTE — PROGRESS NOTE ADULT - ASSESSMENT
Patient is a 69yo female with PMH of heart failure with reduced ejection fraction, CAD s/p CABG 10/2019, stomach cancer, bladder prolapse, diabetes mellitus, paroxysmal atrial fibrillation, and history of lower GI bleed who presented with abdominal distention for the 2 days' duration which has been worsening. On admission, patient was found to have RIC and was in acute on chronic  heart failure exacerbation in setting of atrial flutter. Patient was admitted to the ICU for acute renal failure and was downgraded on 5/19/2020.    # Right kidney hematoma s/p renal biopsy  - urology on board  - s/p 1 PRBC 5/24 - hgb and vitals remain stable today  - will continue aspirin  - holding AC for now - will reconsider after repeat CT  - repeat CT today to eval hematoma    #Acute cardiogenic shock, resolved  #Acute on chronic heart failure exacerbation with reduced ejection fraction secondary to rapid atrial flutter s/p cardioversion 5/15/2020  - Echo 5/17/2020: EF 20-25%, severely decreased LV systolic function, moderate-severe MR, moderate AS, moderate TR, mild pulmonary hypertension, bilateral pleural effusions  - Pro-BNP on admission: 26,469  - Troponin: 0.23->0.23->0.25->0.21  - ECG on admission showed atrial flutter  - Recent outpatient echocardiogram showed EF 45-50% per patient  - EP consult appreciated  - Patient underwent cardioversion on 5/15/2020 after JAMILA showed no intra-cardiac clots  - S/p amiodarone infusion  - Continue with amiodarone 200mg PO QD  - Continue with metoprolol tartrate 25mg PO Q8H  - [id number]: 5; HAS-BLED: 5  - Patient was given warfarin 5mg on 5/22/2020  - Hold anticoagulation for now given acute on chronic anemia  - Heart failure consult appreciated  - Holding carvedilol, Valtessa, Ivabradine, and Entresto for now give RIC on HD  - Patient refusing LifeVest    #Acute on chronic iron deficiency anemia with history of GI bleed 10/2019  - Per patient, outpatient colonoscopy revealed "rectal ulcer"  - Receives IV iron infusions every 2-3 months  - Baseline hemoglobin: 9  - Hemoglobin today: 6.8  - Patient was given warfarin 5mg on 5/22/2020  - Will transfuse 1 unit PRBC slowly given heart failure  - Maintain 2 peripheral 18-gauge IVs  - Keep active type and screen  - Monitor hemoglobin/hematocrit   - Given recent renal biopsy, will order CT abdomen/pelvis with contrast   - holding AC     #RIC on CKD stage 4 currently on hemodialysis T/Th/S  - Secondary to ATN vs AIN (eosinophils in urine) vs cardiorenal (EF severely reduced 45-50%->20-25%)  - Baseline creatinine: 0.9 in 10/2019  - Creatinine today: 3.9  - Patient is anuric and failed furosemide challenge (180mg)  - UA noted for eosinophils, suggestive of interstitial nephritis  - Patient was on ciprofloxacin outpatient for UTI  - Nephrology consult appreciated  - Monitor urine output  - FeNa >1%  - US abdomen: echogenic material around Castellon catheter in urinary bladder  - S/p renal biopsy and Tesio catheter placement by IR 5/21/2020    #CAD s/p CABG 10/2019  - Continue with aspirin 81mg PO QD  - Continue with atorvastatin 40mg PO QHS  - Holding metoprolol tartrate 25mg PO Q8H for hypotension     #UTI in setting of chronic Castellon catheter, resolved  - Urine Cx 5/11/2020: ALLEN  - Completed course of nafcillin    #Left-sided pleural effusion s/p thoracentesis x2, improving  - Thoracentesis done on admission, which drained 300mL of transudative fluid  - Repeat chest X-ray showed re-accumulation of fluid  - Thoracentesis repeated on 5/14/2020, which drained 650mL  - Pulmonology consult appreciated: follow outpatient    #Diabetes mellitus type 2 with history of DKA in 10/2019  - Hemoglobin A1c 5/12/2020: 5.8  - Continue with insulin glargine 9 units SQ QHS  - Hold insulin lispro 3 units SQ TID while NPO  - Insulin sliding scale coverage  - Monitor fingerstick glucose    #History of stomach cancer  - Follow outpatient with oncology    #Bladder prolapse  - Continue with Castellon catheter    #Depression  - Continue with sertraline 25mg PO QD    #Misc  - DVT Prophylaxis: SCDs   - GI Prophylaxis: pantoprazole 40mg PO QD   - Diet:  renal restriction  - Activity: ambulate as tolerated  - IV Fluids: not indicated  - Code Status: Full Code  - Dispo: pending restart of AC and dialysis set up

## 2020-05-25 NOTE — PROGRESS NOTE ADULT - SUBJECTIVE AND OBJECTIVE BOX
Pt stable. Hematuria resolving. Tolerated HD well.     VITAL SIGNS (Last 24 hrs):  T(C): 36.1 (20 @ 04:56), Max: 36.4 (20 @ 13:52)  HR: 70 (20 @ 11:34) (70 - 87)  BP: 125/68 (20 @ 11:34) (116/57 - 146/69)  RR: 18 (20 @ 11:34) (18 - 19)  SpO2: 96% (20 @ 08:15) (96% - 96%)  Wt(kg): --  Daily     Daily Weight in k.6 (25 May 2020 04:56)    I&O's Summary    24 May 2020 07:  -  25 May 2020 07:00  --------------------------------------------------------  IN: 600 mL / OUT: 25 mL / NET: 575 mL    25 May 2020 07:01  -  25 May 2020 12:17  --------------------------------------------------------  IN: 200 mL / OUT: 1500 mL / NET: -1300 mL        PHYSICAL EXAM:  GENERAL: NAD  HEAD:  Atraumatic, Normocephalic  EYES: EOMI, PERRLA, conjunctiva and sclera clear  NECK: Supple, No JVD  CHEST/LUNG: Clear to auscultation bilaterally; No wheeze +Tesio   HEART: Regular rate and rhythm; No murmurs, rubs, or gallops  ABDOMEN: Soft, Nontender, Nondistended; Bowel sounds present +montejo   EXTREMITIES:  2+ Peripheral Pulses, No clubbing, cyanosis, or edema  PSYCH: AAOx3  NEUROLOGY: non-focal  SKIN: No rashes or lesions    Labs Reviewed       CBC Full  -  ( 25 May 2020 06:46 )  WBC Count : 6.32 K/uL  Hemoglobin : 8.2 g/dL  Hematocrit : 26.2 %  Platelet Count - Automated : 171 K/uL  Mean Cell Volume : 91.0 fL  Mean Cell Hemoglobin : 28.5 pg  Mean Cell Hemoglobin Concentration : 31.3 g/dL  Auto Neutrophil # : 4.44 K/uL  Auto Lymphocyte # : 0.85 K/uL  Auto Monocyte # : 0.82 K/uL  Auto Eosinophil # : 0.12 K/uL  Auto Basophil # : 0.02 K/uL  Auto Neutrophil % : 70.3 %  Auto Lymphocyte % : 13.4 %  Auto Monocyte % : 13.0 %  Auto Eosinophil % : 1.9 %  Auto Basophil % : 0.3 %    BMP:     @ 06:46    Blood Urea Nitrogen - 62  Calcium - 8.9  Carbond Dioxide - 24  Chloride - 100  Creatinine - 5.2  Glucose - 68  Potassium - 5.0  Sodium - 136      Hemoglobin A1c -   PT/INR - ( 25 May 2020 06:46 )   PT: 13.00 sec;   INR: 1.13 ratio         PTT - ( 25 May 2020 06:46 )  PTT:33.1 sec  Urine Culture:           MEDICATIONS  (STANDING):  aMIOdarone    Tablet 200 milliGRAM(s) Oral daily  ascorbic acid 500 milliGRAM(s) Oral daily  aspirin  chewable 81 milliGRAM(s) Oral daily  atorvastatin 40 milliGRAM(s) Oral at bedtime  chlorhexidine 4% Liquid 1 Application(s) Topical <User Schedule>  dextrose 50% Injectable 12.5 Gram(s) IV Push once  dextrose 50% Injectable 25 Gram(s) IV Push once  dextrose 50% Injectable 25 Gram(s) IV Push once  epoetin-mana-epbx (RETACRIT) Injectable 60034 Unit(s) IV Push <User Schedule>  insulin glargine Injectable (LANTUS) 5 Unit(s) SubCutaneous at bedtime  insulin lispro (HumaLOG) corrective regimen sliding scale   SubCutaneous three times a day before meals  lactobacillus acidophilus 1 Tablet(s) Oral three times a day  melatonin 3 milliGRAM(s) Oral at bedtime  metoprolol tartrate 25 milliGRAM(s) Oral every 8 hours  pantoprazole    Tablet 40 milliGRAM(s) Oral before breakfast  sertraline 25 milliGRAM(s) Oral daily  sodium chloride 0.9% Bolus 250 milliLiter(s) IV Bolus once  tiotropium 18 MICROgram(s) Capsule 1 Capsule(s) Inhalation daily    MEDICATIONS  (PRN):  acetaminophen   Tablet .. 650 milliGRAM(s) Oral every 6 hours PRN Moderate Pain (4 - 6)  aluminum hydroxide/magnesium hydroxide/simethicone Suspension 15 milliLiter(s) Oral once PRN Dyspepsia  dextrose 40% Gel 15 Gram(s) Oral once PRN Blood Glucose LESS THAN 70 milliGRAM(s)/deciliter  glucagon  Injectable 1 milliGRAM(s) IntraMuscular once PRN Glucose LESS THAN 70 milligrams/deciliter  guaifenesin/dextromethorphan  Syrup 10 milliLiter(s) Oral every 6 hours PRN Cough  loperamide 2 milliGRAM(s) Oral every 4 hours PRN Diarrhea

## 2020-05-25 NOTE — PROGRESS NOTE ADULT - ASSESSMENT
Patient is a 67yo female with PMH of heart failure with reduced ejection fraction, CAD s/p CABG 10/2019, stomach cancer, bladder prolapse, diabetes mellitus, paroxysmal atrial fibrillation, and history of lower GI bleed who presented with abdominal distention for the 2 days' duration which has been worsening. On admission, patient was found to have RIC and was in acute on chronic  heart failure exacerbation in setting of atrial flutter. Patient was admitted to the ICU for acute renal failure and was downgraded on 5/19/2020.    #Right kidney hematoma   - s/p renal biopsy 5/21, c/b by perinephric hematoma   - s/p one unit of pRBC yesterday, vitals stable, hgb stable  - holding AC, appreciate urology input, will repeat CT abd/pelvis   - c/w abd binder  - monitor montejo for hematuria - improving     #Acute cardiogenic shock, resolved  #Acute on chronic heart failure exacerbation with reduced ejection fraction secondary to rapid atrial flutter s/p cardioversion 5/15/2020  - Echo 5/17/2020: EF 20-25%, severely decreased LV systolic function, moderate-severe MR, moderate AS, moderate TR, mild pulmonary hypertension, bilateral pleural effusions  - Pro-BNP on admission: 26,469  - Troponin: 0.23->0.23->0.25->0.21  - ECG on admission showed atrial flutter  - Recent outpatient echocardiogram showed EF 45-50% per patient  - EP consult appreciated  - Patient underwent cardioversion on 5/15/2020 after JAMILA showed no intra-cardiac clots  - S/p amiodarone infusion  - Continue with amiodarone 200mg PO QD  - c/w metoprolol tartrate 25mg PO Q8H  - [id number]: 5; HAS-BLED: 5  - Patient was given warfarin 5mg on 5/22/2020  - Hold anticoagulation for now given renal hematoma   - Heart failure consult appreciated  - Holding carvedilol, Valtessa, Ivabradine, and Entresto for now give RIC on HD  - Patient refusing LifeVest    #Acute on chronic iron deficiency anemia 2/2 nephric hematoma s/p biopsy   - Per patient, outpatient colonoscopy revealed "rectal ulcer"  - Receives IV iron infusions every 2-3 months  - Patient was given warfarin 5mg on 5/22/2020  - s/p 1 unit PRBC slowly given heart failure  - Maintain 2 peripheral 18-gauge IVs  - Keep active type and screen  - Monitor hemoglobin/hematocrit q daily   - holding AC     #RIC on CKD stage 4 currently on hemodialysis T/Th/S  - Secondary to ATN vs AIN (eosinophils in urine) vs cardiorenal (EF severely reduced 45-50%->20-25%)  - Baseline creatinine: 0.9 in 10/2019  - Patient is anuric and failed furosemide challenge (180mg)  - UA noted for eosinophils, suggestive of interstitial nephritis  - Patient was on ciprofloxacin outpatient for UTI  - Nephrology consult appreciated  - Monitor urine output  - FeNa >1%  - US abdomen: echogenic material around Montejo catheter in urinary bladder  - S/p renal biopsy and Tesio catheter placement by IR 5/21/2020    #CAD s/p CABG 10/2019  - Continue with aspirin 81mg PO QD  - Continue with atorvastatin 40mg PO QHS  - c/w metoprolol tartrate 25mg PO Q8H      #UTI in setting of chronic Montejo catheter, resolved  - Urine Cx 5/11/2020: ALLEN  - Completed course of nafcillin    #Left-sided pleural effusion s/p thoracentesis x2, improving  - Thoracentesis done on admission, which drained 300mL of transudative fluid  - Repeat chest X-ray showed re-accumulation of fluid  - Thoracentesis repeated on 5/14/2020, which drained 650mL  - Pulmonology consult appreciated: follow outpatient    #Diabetes mellitus type 2 with history of DKA in 10/2019  - Hemoglobin A1c 5/12/2020: 5.8  - Decrease insulin glargine to 5 units SQ QHS and dc premeal insulin, start sliding scale   - Monitor fingerstick glucose    #History of stomach cancer  - Follow outpatient with oncology    #Bladder prolapse  - Continue with Montejo catheter    #Depression  - Continue with sertraline 25mg PO QD    #Misc  - DVT Prophylaxis: SCDs   - GI Prophylaxis: pantoprazole 40mg PO QD   - Diet:  Renal   - Code Status: Full Code    #Progress Note Handoff  Pending (specify):  repeat CT abd today, outpatient HD set up    Family discussion: dw pt and daughter plan as above   Disposition: Home

## 2020-05-25 NOTE — PROGRESS NOTE ADULT - SUBJECTIVE AND OBJECTIVE BOX
UROLOGY DAILY PROGRESS NOTE    Pt is a 69y Female s/p R renal biopsy with subsequent development of hematoma s/p 1 PRBC, seen and examined at bedside in HD with Dr Summers. Pt doing well, no complaints- denies flank or back pain. Has not required any further transfusions.    MEDICATIONS  (STANDING):  aMIOdarone    Tablet 200 milliGRAM(s) Oral daily  ascorbic acid 500 milliGRAM(s) Oral daily  aspirin  chewable 81 milliGRAM(s) Oral daily  atorvastatin 40 milliGRAM(s) Oral at bedtime  chlorhexidine 4% Liquid 1 Application(s) Topical <User Schedule>  dextrose 50% Injectable 12.5 Gram(s) IV Push once  dextrose 50% Injectable 25 Gram(s) IV Push once  dextrose 50% Injectable 25 Gram(s) IV Push once  epoetin-mana-epbx (RETACRIT) Injectable 08449 Unit(s) IV Push <User Schedule>  insulin glargine Injectable (LANTUS) 5 Unit(s) SubCutaneous at bedtime  insulin lispro (HumaLOG) corrective regimen sliding scale   SubCutaneous three times a day before meals  lactobacillus acidophilus 1 Tablet(s) Oral three times a day  melatonin 3 milliGRAM(s) Oral at bedtime  metoprolol tartrate 25 milliGRAM(s) Oral every 8 hours  pantoprazole    Tablet 40 milliGRAM(s) Oral before breakfast  sertraline 25 milliGRAM(s) Oral daily  sodium chloride 0.9% Bolus 250 milliLiter(s) IV Bolus once  tiotropium 18 MICROgram(s) Capsule 1 Capsule(s) Inhalation daily    MEDICATIONS  (PRN):  acetaminophen   Tablet .. 650 milliGRAM(s) Oral every 6 hours PRN Moderate Pain (4 - 6)  aluminum hydroxide/magnesium hydroxide/simethicone Suspension 15 milliLiter(s) Oral once PRN Dyspepsia  dextrose 40% Gel 15 Gram(s) Oral once PRN Blood Glucose LESS THAN 70 milliGRAM(s)/deciliter  glucagon  Injectable 1 milliGRAM(s) IntraMuscular once PRN Glucose LESS THAN 70 milligrams/deciliter  guaifenesin/dextromethorphan  Syrup 10 milliLiter(s) Oral every 6 hours PRN Cough  loperamide 2 milliGRAM(s) Oral every 4 hours PRN Diarrhea      REVIEW OF SYSTEMS   [x] A ten-point review of systems was otherwise negative except as noted.  [ ] Due to altered mental status/intubation, subjective information were not able to be obtained from patient. History was obtained, to the extent possible, from review of the chart and collateral sources of information.  Vital Signs Last 24 Hrs  T(C): 36.1 (25 May 2020 04:56), Max: 36.4 (24 May 2020 13:52)  T(F): 96.9 (25 May 2020 04:56), Max: 97.6 (24 May 2020 13:52)  HR: 72 (25 May 2020 08:15) (72 - 87)  BP: 127/78 (25 May 2020 08:15) (116/57 - 146/69)  RR: 18 (25 May 2020 08:15) (18 - 19)  SpO2: 96% (25 May 2020 08:15) (96% - 96%)    PHYSICAL EXAM:    GEN: NAD, well-developed, awake and alert.  SKIN: Good color, non diaphoretic.  HEENT: NC/AT.  RESP: No dyspnea, non-labored breathing. No use of accessory muscles.  CARDIO: +S1/S2  ABDO: Soft, NT/ND, no palpable bladder, no suprapubic tenderness  BACK: No CVAT B/L  : Indwelling montejo in place, draining red tinged urine.       I&O's Summary    24 May 2020 07:01  -  25 May 2020 07:00  --------------------------------------------------------  IN: 600 mL / OUT: 25 mL / NET: 575 mL    25 May 2020 07:01  -  25 May 2020 11:06  --------------------------------------------------------  IN: 200 mL / OUT: 0 mL / NET: 200 mL        LABS:                        8.2    6.32  )-----------( 171      ( 25 May 2020 06:46 )             26.2     05-25    136  |  100  |  62<HH>  ----------------------------<  68<L>  5.0   |  24  |  5.2<HH>    Ca    8.9      25 May 2020 06:46  Phos  5.8     05-25  Mg     2.2     05-25    TPro  5.1<L>  /  Alb  3.0<L>  /  TBili  0.3  /  DBili  x   /  AST  10  /  ALT  5   /  AlkPhos  53  05-25    PT/INR - ( 25 May 2020 06:46 )   PT: 13.00 sec;   INR: 1.13 ratio         PTT - ( 25 May 2020 06:46 )  PTT:33.1 sec

## 2020-05-26 NOTE — PROGRESS NOTE ADULT - SUBJECTIVE AND OBJECTIVE BOX
[facility] FOLLOW UP NOTE  --------------------------------------------------------------------------------  24 hour events/subjective: Patient examined. Appears comfortable.    PAST HISTORY  --------------------------------------------------------------------------------  No significant changes to PMH, PSH, FHx, SHx, unless otherwise noted    ALLERGIES & MEDICATIONS  --------------------------------------------------------------------------------  Allergies    No Known Allergies    Standing Inpatient Medications  aMIOdarone    Tablet 200 milliGRAM(s) Oral daily  ascorbic acid 500 milliGRAM(s) Oral daily  aspirin  chewable 81 milliGRAM(s) Oral daily  atorvastatin 40 milliGRAM(s) Oral at bedtime  chlorhexidine 4% Liquid 1 Application(s) Topical <User Schedule>  dextrose 50% Injectable 12.5 Gram(s) IV Push once  dextrose 50% Injectable 25 Gram(s) IV Push once  dextrose 50% Injectable 25 Gram(s) IV Push once  epoetin-mana-epbx (RETACRIT) Injectable 94112 Unit(s) IV Push <User Schedule>  insulin glargine Injectable (LANTUS) 5 Unit(s) SubCutaneous at bedtime  insulin lispro (HumaLOG) corrective regimen sliding scale   SubCutaneous three times a day before meals  lactobacillus acidophilus 1 Tablet(s) Oral three times a day  melatonin 3 milliGRAM(s) Oral at bedtime  metoprolol tartrate 25 milliGRAM(s) Oral every 8 hours  pantoprazole    Tablet 40 milliGRAM(s) Oral before breakfast  sertraline 25 milliGRAM(s) Oral daily  sodium chloride 0.9% Bolus 250 milliLiter(s) IV Bolus once  tiotropium 18 MICROgram(s) Capsule 1 Capsule(s) Inhalation daily    PRN Inpatient Medications  acetaminophen   Tablet .. 650 milliGRAM(s) Oral every 6 hours PRN  aluminum hydroxide/magnesium hydroxide/simethicone Suspension 15 milliLiter(s) Oral once PRN  dextrose 40% Gel 15 Gram(s) Oral once PRN  glucagon  Injectable 1 milliGRAM(s) IntraMuscular once PRN  guaifenesin/dextromethorphan  Syrup 10 milliLiter(s) Oral every 6 hours PRN  loperamide 2 milliGRAM(s) Oral every 4 hours PRN      VITALS/PHYSICAL EXAM  --------------------------------------------------------------------------------  T(C): 35.7 (05-26-20 @ 05:09), Max: 36.4 (05-25-20 @ 20:15)  HR: 83 (05-26-20 @ 05:09) (70 - 92)  BP: 142/65 (05-26-20 @ 05:09) (107/58 - 142/65)  RR: 18 (05-26-20 @ 05:09) (18 - 18)  SpO2: 98% (05-26-20 @ 06:12) (98% - 98%)  Wt(kg): --        05-25-20 @ 07:01  -  05-26-20 @ 07:00  --------------------------------------------------------  IN: 825 mL / OUT: 1675 mL / NET: -850 mL    05-26-20 @ 07:01  -  05-26-20 @ 10:50  --------------------------------------------------------  IN: 200 mL / OUT: 0 mL / NET: 200 mL      Physical Exam:  	Gen: NAD  	Pulm: CTA B/L  	CV: RRR, S1S2  	Abd: +BS, soft, nontender/nondistended  	: No suprapubic tenderness  	LE: Warm,  edema  	Vascular access: TDC    LABS/STUDIES  --------------------------------------------------------------------------------              7.9    6.19  >-----------<  180      [05-26-20 @ 04:45]              24.9     135  |  98  |  40  ----------------------------<  81      [05-26-20 @ 04:45]  4.4   |  25  |  3.5        Ca     8.5     [05-26-20 @ 04:45]      Mg     2.0     [05-26-20 @ 04:45]      Phos  5.8     [05-25-20 @ 06:46]    TPro  5.0  /  Alb  2.9  /  TBili  0.3  /  DBili  x   /  AST  10  /  ALT  5   /  AlkPhos  52  [05-26-20 @ 04:45]    PT/INR: PT 13.00, INR 1.13       [05-25-20 @ 06:46]  PTT: 33.1       [05-25-20 @ 06:46]      Creatinine Trend:  SCr 3.5 [05-26 @ 04:45]  SCr 5.2 [05-25 @ 06:46]  SCr 4.7 [05-24 @ 05:09]  SCr 3.9 [05-23 @ 06:05]  SCr 5.7 [05-21 @ 05:26]    Urinalysis - [05-11-20 @ 22:30]      Color Yellow / Appearance Turbid / SG 1.005 / pH 7.0      Gluc Negative / Ketone Negative  / Bili Negative / Urobili <2 mg/dL       Blood Moderate / Protein 100 mg/dL / Leuk Est Large / Nitrite Negative      RBC 3 / WBC >720 / Hyaline 8 / Gran  / Sq Epi  / Non Sq Epi 3 / Bacteria Moderate      Iron 24, TIBC 204, %sat 12      [08-30-19 @ 07:16]  Ferritin 401      [05-19-20 @ 06:25]  HbA1c 5.7      [11-09-19 @ 01:20]  TSH 0.58      [08-02-19 @ 18:37]  Lipid: chol 153, , HDL 45, LDL 75      [08-30-19 @ 07:16]    HBsAb Nonreact      [05-19-20 @ 14:58]  HBsAg Nonreact      [05-19-20 @ 14:58]  HCV 0.33, Nonreact      [05-19-20 @ 14:58]    Free Light Chains: kappa 6.76, lambda 3.58, ratio = 1.89      [05-19 @ 04:50]  Immunofixation Serum:   No Monoclonal Band Identified    Reference Range: None Detected      [05-19-20 @ 04:50]  SPEP Interpretation: Hypogammaglobulinemia      [05-19-20 @ 04:50]

## 2020-05-26 NOTE — PROGRESS NOTE ADULT - ATTENDING COMMENTS
Patient seen and examined. Hgb and vitals remain stable. Right arm swelling improving. Hematuria resolved in montejo. Denies any complaints.   Will start hydralazine, isordil, torsemide, and increase metoprolol as per heart failure attending's recommendations. Start on IV iron sucrose as per nephro. Renal biopsy proved ATN on chronic kidney disease due to DMII.   Will hold anticoagulation given renal hematoma and hx of GI bleed. As per Dr. Gandara, patient had left atrial appendage ligation during CABG which lowers her risk for thromboembolism.   Set up to initiate outpatient starting Friday, will dc in AM after HD.   Rest of plan as above.

## 2020-05-26 NOTE — PROGRESS NOTE ADULT - SUBJECTIVE AND OBJECTIVE BOX
Interventional Radiology Follow- Up Note        69y Female s/p tunneled catheter placement and Right renal bx on 5/21 in Interventional Radiology with Dr Castro        Still c/o mild discomfort at the tunneled catheter side.     Vitals: T(F): 96.3 (05-26-20 @ 05:09), Max: 97.6 (05-25-20 @ 20:15)  HR: 83 (05-26-20 @ 05:09) (70 - 92)  BP: 142/65 (05-26-20 @ 05:09) (107/58 - 142/65)  RR: 18 (05-26-20 @ 05:09) (18 - 18)  SpO2: 98% (05-26-20 @ 06:12) (98% - 98%)  Wt(kg): --    LABS:                        7.9    6.19  )-----------( 180      ( 26 May 2020 04:45 )             24.9     05-26    135  |  98  |  40<H>  ----------------------------<  81  4.4   |  25  |  3.5<H>    Ca    8.5      26 May 2020 04:45  Phos  5.8     05-25  Mg     2.0     05-26    TPro  5.0<L>  /  Alb  2.9<L>  /  TBili  0.3  /  DBili  x   /  AST  10  /  ALT  5   /  AlkPhos  52  05-26    PT/INR - ( 25 May 2020 06:46 )   PT: 13.00 sec;   INR: 1.13 ratio         PTT - ( 25 May 2020 06:46 )  PTT:33.1 sec    Culture:   output :    PHYSICAL EXAM:  General: Nontoxic, in NAD  Neuro:  Alert & oriented x 3  Abdomen: soft, NTND    A/P:  69 F s/p tunneled catheter placement and R renal bx on 5/21.  CT 5/25 - Small perinephric hematoma, stable in size   - Pt doing well, HD stable  - cont Uro recs     Please call Interventional Radiology [phone] with any questions, concerns, or issues regarding above.

## 2020-05-26 NOTE — PROGRESS NOTE ADULT - SUBJECTIVE AND OBJECTIVE BOX
Progress Note    Subjective  68 y/o Female with teddy nephric hematoma following renal biopsy. Pt s/p cardioversion for A Flutter.  Pt doing better, Hb has been stable. Repeat ct scan with stable teddy nephric hematoma.    [x] a 10 point review of systems was negative except where noted    [  ]  Due to altered mental status/intubation, subjective information was not able t be obtained from the patient.  History was obtained, to the extent possible, from review of the chart and collateral sources of information.    Vital signs  T(C): , Max: 36.4 (05-25-20 @ 20:15)  HR: 83 (05-26-20 @ 05:09)  BP: 142/65 (05-26-20 @ 05:09)  SpO2: 98% (05-26-20 @ 06:12)        Labs                        7.9    6.19  )-----------( 180      ( 26 May 2020 04:45 )             24.9     26 May 2020 04:45    135    |  98     |  40     ----------------------------<  81     4.4     |  25     |  3.5      Ca    8.5        26 May 2020 04:45  Phos  5.8       25 May 2020 06:46  Mg     2.0       26 May 2020 04:45  Progress Note    Subjective  68 y/o Female with teddy nephric hematoma following renal biopsy. Pt s/p cardioversion for A Flutter.  Pt doing better, Hb has been stable. Repeat ct scan with stable teddy nephric hematoma.    [x] a 10 point review of systems was negative except where noted    [  ]  Due to altered mental status/intubation, subjective information was not able t be obtained from the patient.  History was obtained, to the extent possible, from review of the chart and collateral sources of information.    Vital signs  T(C): , Max: 36.4 (05-25-20 @ 20:15)  HR: 83 (05-26-20 @ 05:09)  BP: 142/65 (05-26-20 @ 05:09)  SpO2: 98% (05-26-20 @ 06:12)        Labs                        7.9    6.19  )-----------( 180      ( 26 May 2020 04:45 )             24.9     26 May 2020 04:45    135    |  98     |  40     ----------------------------<  81     4.4     |  25     |  3.5      Ca    8.5        26 May 2020 04:45  Phos  5.8       25 May 2020 06:46  Mg     2.0       26 May 2020 04:45  PE:  Abdomen:  Soft, non-tender  :  montejo in place with clear drainage  no CVA tenderness

## 2020-05-26 NOTE — PROGRESS NOTE ADULT - PROBLEM SELECTOR PLAN 1
Agree with above  In addition, she apparently will be discharged soon.  Needs to f/up with me as outpatient in 3-5 days.  contact Information given to the patient. Agree with above  Restart coumadin if risk/benefits warrant it.  Risk of rebleeding discussed with the patient.  She wants to proceed with anticoagulation.  She understands all the medical reasons.  In addition, she apparently will be discharged soon.  Needs to f/up with me as outpatient in 3-5 days.  contact Information given to the patient.

## 2020-05-26 NOTE — PROGRESS NOTE ADULT - ASSESSMENT
1. RIC. Biopsy-proven ATN. Has underlying CKD secondary to diabetic nephropathy. Monitor I/Os. Daily BMP. Tunneled HD catheter placed. HD MWF schedule. Can start outpatient HD at [facility]: [facility] on Friday.  2. UTI/sepsis. Completed nafcillin.  3. Hypotension. Off pressor support.

## 2020-05-26 NOTE — PROGRESS NOTE ADULT - ASSESSMENT
68 y/o F with perinephric hematoma s/p renal Bx.  Pt s/p cardioversion for A Flutter.    A) Urologically stable  perinephric hematoma  acute blood loss anemia    P) No further acute urologic intervention at this time.  Spoke with medical resident today. Pt needs to restart Anticoagulation.  discussed that they may restart and need to monitor Hb and if her  urine becomes bloody. To transfuse as needed.  recall as needed  will d/w attending

## 2020-05-26 NOTE — PROGRESS NOTE ADULT - NSHPATTENDINGPLANDISCUSS_GEN_ALL_CORE
ICU team
PA
resident, RN, CM
resident, RN, CM
resident, RN
resident, RN, CM
KEHINDE Jeronimo
KEHINDE Elliott

## 2020-05-26 NOTE — PROGRESS NOTE ADULT - ASSESSMENT
Patient is a 67yo female with PMH of heart failure with reduced ejection fraction, CAD s/p CABG 10/2019, stomach cancer, bladder prolapse, diabetes mellitus, paroxysmal atrial fibrillation, and history of lower GI bleed who presented with abdominal distention for the 2 days' duration which has been worsening. On admission, patient was found to have RIC and was in acute on chronic  heart failure exacerbation in setting of atrial flutter. Patient was admitted to the ICU for acute renal failure and was downgraded on 5/19/2020.    # Right kidney hematoma s/p renal biopsy  - urology on board  - s/p 1 PRBC 5/24 - hgb and vitals remain stable  - will continue aspirin  - repeat CT today to eval hematoma - stable in size  - resolved hematuria  - per urology 5/26 - ok to restart AC if pt needs, and observe    #Acute cardiogenic shock, resolved  #Acute on chronic heart failure exacerbation with reduced ejection fraction secondary to rapid atrial flutter s/p cardioversion 5/15/2020  - Echo 5/17/2020: EF 20-25%, severely decreased LV systolic function, moderate-severe MR, moderate AS, moderate TR, mild pulmonary hypertension, bilateral pleural effusions  - Pro-BNP on admission: 26,469  - Troponin: 0.23->0.23->0.25->0.21  - ECG on admission showed atrial flutter  - Recent outpatient echocardiogram showed EF 45-50% per patient  - EP consult appreciated  - Patient underwent cardioversion on 5/15/2020 after JAMILA showed no intra-cardiac clots  - S/p amiodarone infusion  - Continue with amiodarone 200mg PO QD  - Continue with metoprolol tartrate 25mg PO Q8H  - [id number]: 5; HAS-BLED: 5  - Patient was given warfarin 5mg on 5/22/2020  - AC was held in light on hematoma/hematuria - will restart 5/26  - Heart failure consult appreciated - will FU with Juliocesar for recs on restarting meds   - Holding carvedilol, Valtessa, Ivabradine, and Entresto for now give RIC on HD  - Patient refusing LifeVest    #Acute on chronic iron deficiency anemia with history of GI bleed 10/2019  - Per patient, outpatient colonoscopy revealed "rectal ulcer"  - Receives IV iron infusions every 2-3 months  - Baseline hemoglobin: 9  - Hemoglobin today: 7.9; asymptomatic  - Patient was given warfarin 5mg on 5/22/2020  - Maintain 2 peripheral 18-gauge IVs  - Keep active type and screen  - Monitor hemoglobin/hematocrit     #RIC on CKD stage 4 currently on hemodialysis T/Th/S  - Secondary to ATN vs AIN (eosinophils in urine) vs cardiorenal (EF severely reduced 45-50%->20-25%)  - Baseline creatinine: 0.9 in 10/2019  - Creatinine today: 3.5  - UA noted for eosinophils, suggestive of interstitial nephritis  - Patient was on ciprofloxacin outpatient for UTI  - Nephrology consult appreciated  - Monitor urine output  - FeNa >1%  - US abdomen: echogenic material around Castellon catheter in urinary bladder  - S/p renal biopsy and Tesio catheter placement by IR 5/21/2020 -FU results   - pt pending OP dialysis set up prior to d.c    #CAD s/p CABG 10/2019  - Continue with aspirin 81mg PO QD  - Continue with atorvastatin 40mg PO QHS  - c/w metoprolol    #UTI in setting of chronic Castellon catheter, resolved  - Urine Cx 5/11/2020: ALLEN  - Completed course of nafcillin    #Left-sided pleural effusion s/p thoracentesis x2, improving  - Thoracentesis done on admission, which drained 300mL of transudative fluid  - Thoracentesis repeated on 5/14/2020, which drained 650mL  - Pulmonology consult appreciated: follow outpatient    #Diabetes mellitus type 2 with history of DKA in 10/2019  - Hemoglobin A1c 5/12/2020: 5.8  - Lantus 5 plus Insulin sliding scale coverage  - Monitor fingerstick glucose    #History of stomach cancer  - Follow outpatient with oncology    #Bladder prolapse  - Continue with Castellon catheter    #Depression  - Continue with sertraline 25mg PO QD    #Misc  - DVT Prophylaxis: SCDs   - GI Prophylaxis: pantoprazole 40mg PO QD   - Diet:  renal restriction  - Activity: ambulate as tolerated  - IV Fluids: not indicated  - Code Status: Full Code  - Dispo: pending restart of AC and dialysis set up, HF FU

## 2020-05-26 NOTE — PROGRESS NOTE ADULT - SUBJECTIVE AND OBJECTIVE BOX
SUBJECTIVE:    Patient is a 69y old Female who presents with a chief complaint of sob (25 May 2020 17:09)    Currently admitted to medicine with the primary diagnosis of CHF (congestive heart failure)     Today is hospital day 15d. This morning she is resting comfortably in bed and reports no new issues or overnight events. Denies CP, palpitations, SOB, abdominal/flank pain.     PAST MEDICAL & SURGICAL HISTORY  Heart failure  Female bladder prolapse  Diabetes  S/P CABG (coronary artery bypass graft)  History of repair of hip fracture    SOCIAL HISTORY:  Negative for smoking/alcohol/drug use.     ALLERGIES:  No Known Allergies    MEDICATIONS:  STANDING MEDICATIONS  acetaminophen   Tablet .. 650 milliGRAM(s) Oral every 6 hours PRN Moderate Pain (4 - 6)  aluminum hydroxide/magnesium hydroxide/simethicone Suspension 15 milliLiter(s) Oral once PRN Dyspepsia  aMIOdarone    Tablet 200 milliGRAM(s) Oral daily  ascorbic acid 500 milliGRAM(s) Oral daily  aspirin  chewable 81 milliGRAM(s) Oral daily  atorvastatin 40 milliGRAM(s) Oral at bedtime  chlorhexidine 4% Liquid 1 Application(s) Topical <User Schedule>  dextrose 40% Gel 15 Gram(s) Oral once PRN Blood Glucose LESS THAN 70 milliGRAM(s)/deciliter  dextrose 50% Injectable 12.5 Gram(s) IV Push once  dextrose 50% Injectable 25 Gram(s) IV Push once  dextrose 50% Injectable 25 Gram(s) IV Push once  epoetin-mana-epbx (RETACRIT) Injectable 74984 Unit(s) IV Push <User Schedule>  glucagon  Injectable 1 milliGRAM(s) IntraMuscular once PRN Glucose LESS THAN 70 milligrams/deciliter  guaifenesin/dextromethorphan  Syrup 10 milliLiter(s) Oral every 6 hours PRN Cough  insulin glargine Injectable (LANTUS) 5 Unit(s) SubCutaneous at bedtime  insulin lispro (HumaLOG) corrective regimen sliding scale   SubCutaneous three times a day before meals  lactobacillus acidophilus 1 Tablet(s) Oral three times a day  loperamide 2 milliGRAM(s) Oral every 4 hours PRN Diarrhea  melatonin 3 milliGRAM(s) Oral at bedtime  metoprolol tartrate 25 milliGRAM(s) Oral every 8 hours  pantoprazole    Tablet 40 milliGRAM(s) Oral before breakfast  sertraline 25 milliGRAM(s) Oral daily  sodium chloride 0.9% Bolus 250 milliLiter(s) IV Bolus once  tiotropium 18 MICROgram(s) Capsule 1 Capsule(s) Inhalation daily    PRN MEDICATIONS  acetaminophen   Tablet .. 650 milliGRAM(s) Oral every 6 hours PRN  aluminum hydroxide/magnesium hydroxide/simethicone Suspension 15 milliLiter(s) Oral once PRN  dextrose 40% Gel 15 Gram(s) Oral once PRN  glucagon  Injectable 1 milliGRAM(s) IntraMuscular once PRN  guaifenesin/dextromethorphan  Syrup 10 milliLiter(s) Oral every 6 hours PRN  loperamide 2 milliGRAM(s) Oral every 4 hours PRN    VITALS:   T(F): 96.3  HR: 83 (70 - 92)  BP: 142/65 (107/58 - 142/65)  RR: 18 (18 - 18)  SpO2: 98% (98% - 98%)    LABS:                        7.9    6.19  )-----------( 180      ( 26 May 2020 04:45 )             24.9     05-26    135  |  98  |  40<H>  ----------------------------<  81  4.4   |  25  |  3.5<H>    Ca    8.5      26 May 2020 04:45  Phos  5.8     05-25  Mg     2.0     05-26    TPro  5.0<L>  /  Alb  2.9<L>  /  TBili  0.3  /  DBili  x   /  AST  10  /  ALT  5   /  AlkPhos  52  05-26    PT/INR - ( 25 May 2020 06:46 )   PT: 13.00 sec;   INR: 1.13 ratio         PTT - ( 25 May 2020 06:46 )  PTT:33.1 sec    PHYSICAL EXAM:  GEN: In no acute distress. Pt. is awake in bed able to have a conversation.  LUNGS: Symmetrical inspiration, no increased work of breathing; very faint crackles BL bases  HEART: +S1,S2, No murmurs, Rubs, Gallops appreciated  ABD: Bowel Sounds Present, Soft, non tender, non distended, no guarding, no rebound. No flank tenderness.  EXT: Bl pitting edema LE extremity extending up to proximal calves.   NEURO: AAOX3. No focal deficits.

## 2020-05-27 NOTE — PROGRESS NOTE ADULT - ASSESSMENT
70 y/o F with h/o CAD s/p CABG, AF/flutter, recurrent UTI/permanent Castellon, chronic systolic heart failure admitted in mixed shock septic/cardiogenic. S/p thoracentesis, patient found to be in atrial flutter s/p DCCV, now on heparin gtt. LVEF dropped to 30 from ~ 45% about 2 months ago. POC US showed LVEF ~ 15%. Patient was started on hemodialysis, s/p vascular access.

## 2020-05-27 NOTE — PROGRESS NOTE ADULT - PROBLEM SELECTOR PLAN 4
Continue Abx  Follow up blood cultures
Continue Abx
H/H 7.6/24  S/p blood transfusion after bleeding post kidney biopsy  Hold further transfusion as long as Hb >7 gr/dl  Monitor H/H   Check iron profile - give iv iron sucrose if indicated  Epoetin per renal   Get [place] exam

## 2020-05-27 NOTE — PROGRESS NOTE ADULT - ASSESSMENT
Patient is a 67yo female with PMH of heart failure with reduced ejection fraction, CAD s/p CABG 10/2019, stomach cancer, bladder prolapse, diabetes mellitus, paroxysmal atrial fibrillation, and history of lower GI bleed who presented with abdominal distention for the 2 days' duration which has been worsening. On admission, patient was found to have RIC and was in acute on chronic  heart failure exacerbation in setting of atrial flutter. Patient was admitted to the ICU for acute renal failure and was downgraded on 5/19/2020.    # Right kidney hematoma s/p renal biopsy  - s/p 1 PRBC 5/24 -  vitals remain stable, Hgb 7.5 this AM -will transfuse  - will continue aspirin  - repeat CT to eval hematoma - stable in size  - resolved hematuria  - per urology 5/26 - ok to restart AC if pt needs, and observe; however, per cardio Dr Gandara, since atrial appendage was clipped during CABG, risk of bleed outweighs risk of forming a clot at this time - will defer AC restart after dc    #Acute cardiogenic shock, resolved  #Acute on chronic heart failure exacerbation with reduced ejection fraction secondary to rapid atrial flutter s/p cardioversion 5/15/2020  - Echo 5/17/2020: EF 20-25%, severely decreased LV systolic function, moderate-severe MR, moderate AS, moderate TR, mild pulmonary hypertension, bilateral pleural effusions  - Pro-BNP on admission: 26,469  - Troponin: 0.23->0.23->0.25->0.21  - ECG on admission showed atrial flutter  - Recent outpatient echocardiogram showed EF 45-50% per patient  - EP consult appreciated  - Patient underwent cardioversion on 5/15/2020 after JAMILA showed no intra-cardiac clots  - S/p amiodarone infusion  - Continue with amiodarone 200mg PO QD  - [id number]: 5; HAS-BLED: 5  - Patient was given warfarin 5mg on 5/22/2020  - AC was held in light on hematoma/hematuria - however, per cardio Dr Gandara, since atrial appendage was clipped during CABG, risk of bleed outweighs risk of forming a clot at this time - will defer AC restart after dc on follow up  - Heart failure consult appreciated   - Started hydralazine, torsemide, isordil, metoprolol XL 5/26/20  - Patient refusing LifeVest    #Acute on chronic iron deficiency anemia with history of GI bleed 10/2019  - Per patient, outpatient colonoscopy revealed "rectal ulcer"  - Receives IV iron infusions every 2-3 months  - Baseline hemoglobin: 9  - Hemoglobin today: 7.6; will give 1 unit slowly after dialysis    - Patient was given warfarin 5mg on 5/22/2020  - Maintain 2 peripheral 18-gauge IVs  - Keep active type and screen  - Monitor hemoglobin/hematocrit   - will give 100mg iron sucrose during dialysis session     #RIC on CKD stage 4 currently on hemodialysis T/Th/S  - Secondary to ATN vs AIN (eosinophils in urine) vs cardiorenal (EF severely reduced 45-50%->20-25%)  - Baseline creatinine: 0.9 in 10/2019  - Creatinine today: 3.5  - UA noted for eosinophils, suggestive of interstitial nephritis  - Patient was on ciprofloxacin outpatient for UTI  - Nephrology consult appreciated  - Monitor urine output  - FeNa >1%  - US abdomen: echogenic material around Castellon catheter in urinary bladder  - S/p renal biopsy and Tesio catheter placement by IR 5/21/2020 - prelim bx showing ATN  - OP dialysis set up for [facility]    #CAD s/p CABG 10/2019  - Continue with aspirin 81mg PO QD  - Continue with atorvastatin 40mg PO QHS  - c/w metoprolol    #UTI in setting of chronic Castellon catheter, resolved  - Urine Cx 5/11/2020: ALLEN  - Completed course of nafcillin    #Left-sided pleural effusion s/p thoracentesis x2, improving  - Thoracentesis done on admission, which drained 300mL of transudative fluid  - Thoracentesis repeated on 5/14/2020, which drained 650mL  - Pulmonology consult appreciated: follow outpatient    #Diabetes mellitus type 2 with history of DKA in 10/2019  - Hemoglobin A1c 5/12/2020: 5.8  - Lantus 5 plus Insulin sliding scale coverage  - Monitor fingerstick glucose    #History of stomach cancer  - Follow outpatient with oncology    #Bladder prolapse  - Continue with Castellon catheter    #Depression  - Continue with sertraline 25mg PO QD    #Misc  - DVT Prophylaxis: SCDs   - GI Prophylaxis: pantoprazole 40mg PO QD   - Diet:  renal restriction  - Activity: ambulate as tolerated  - IV Fluids: not indicated  - Code Status: Full Code  - Dispo: pending transfusion 5/27  and FU Hgb in AM; possible dc 5/28 Patient is a 69yo female with PMH of heart failure with reduced ejection fraction, CAD s/p CABG 10/2019, stomach cancer, bladder prolapse, diabetes mellitus, paroxysmal atrial fibrillation, and history of lower GI bleed who presented with abdominal distention for the 2 days' duration which has been worsening. On admission, patient was found to have RIC and was in acute on chronic  heart failure exacerbation in setting of atrial flutter. Patient was admitted to the ICU for acute renal failure downgraded 5/19/20, s/p CVVHD 5/15, IR guided bx and tessio placement 5/21 complicated  by renal hematoma and hematuria.    # Right kidney hematoma s/p renal biopsy  - s/p 1 PRBC 5/24 -  vitals remain stable, Hgb 7.5 this AM -will transfuse  - will continue aspirin  - repeat CT to eval hematoma - stable in size  - resolved hematuria  - per urology 5/26 - ok to restart AC if pt needs, and observe; however, per cardio Dr Gandara, since atrial appendage was clipped during CABG, risk of bleed outweighs risk of forming a clot at this time - will defer AC restart after dc    #Acute cardiogenic shock, resolved  #Acute on chronic heart failure exacerbation with reduced ejection fraction secondary to rapid atrial flutter s/p cardioversion 5/15/2020  - Echo 5/17/2020: EF 20-25%, severely decreased LV systolic function, moderate-severe MR, moderate AS, moderate TR, mild pulmonary hypertension, bilateral pleural effusions  - Pro-BNP on admission: 26,469  - Troponin: 0.23->0.23->0.25->0.21  - ECG on admission showed atrial flutter  - Recent outpatient echocardiogram showed EF 45-50% per patient  - EP consult appreciated  - Patient underwent cardioversion on 5/15/2020 after JAMILA showed no intra-cardiac clots  - S/p amiodarone infusion  - Continue with amiodarone 200mg PO QD  - [id number]: 5; HAS-BLED: 5  - Patient was given warfarin 5mg on 5/22/2020  - AC was held in light on hematoma/hematuria - however, per cardio Dr Gandara, since atrial appendage was clipped during CABG, risk of bleed outweighs risk of forming a clot at this time - will defer AC restart after dc on follow up  - Heart failure consult appreciated   - Started hydralazine, torsemide, isordil, metoprolol XL 5/26/20  - Patient refusing LifeVest    #Acute on chronic iron deficiency anemia with history of GI bleed 10/2019  - Per patient, outpatient colonoscopy revealed "rectal ulcer"  - Receives IV iron infusions every 2-3 months  - Baseline hemoglobin: 9  - Hemoglobin today: 7.6; will give 1 unit slowly after dialysis    - Patient was given warfarin 5mg on 5/22/2020  - Maintain 2 peripheral 18-gauge IVs  - Keep active type and screen  - Monitor hemoglobin/hematocrit   - will give 100mg iron sucrose during dialysis session     #RIC on CKD stage 4 currently on hemodialysis T/Th/S  - Secondary to ATN vs AIN (eosinophils in urine) vs cardiorenal (EF severely reduced 45-50%->20-25%)  - Baseline creatinine: 0.9 in 10/2019  - Creatinine today: 3.5  - UA noted for eosinophils, suggestive of interstitial nephritis  - Patient was on ciprofloxacin outpatient for UTI  - Nephrology consult appreciated  - Monitor urine output  - FeNa >1%  - US abdomen: echogenic material around Castellon catheter in urinary bladder  - S/p renal biopsy and Tesio catheter placement by IR 5/21/2020 - prelim bx showing ATN  - OP dialysis set up for [facility]    #CAD s/p CABG 10/2019  - Continue with aspirin 81mg PO QD  - Continue with atorvastatin 40mg PO QHS  - c/w metoprolol    #UTI in setting of chronic Castellon catheter, resolved  - Urine Cx 5/11/2020: ALLEN  - Completed course of nafcillin    #Left-sided pleural effusion s/p thoracentesis x2, improving  - Thoracentesis done on admission, which drained 300mL of transudative fluid  - Thoracentesis repeated on 5/14/2020, which drained 650mL  - Pulmonology consult appreciated: follow outpatient    #Diabetes mellitus type 2 with history of DKA in 10/2019  - Hemoglobin A1c 5/12/2020: 5.8  - Lantus 5 plus Insulin sliding scale coverage  - Monitor fingerstick glucose    #History of stomach cancer  - Follow outpatient with oncology    #Bladder prolapse  - Continue with Castellon catheter    #Depression  - Continue with sertraline 25mg PO QD    #Misc  - DVT Prophylaxis: SCDs   - GI Prophylaxis: pantoprazole 40mg PO QD   - Diet:  renal restriction  - Activity: ambulate as tolerated  - IV Fluids: not indicated  - Code Status: Full Code  - Dispo: pending transfusion 5/27  and FU Hgb in AM; possible dc 5/28

## 2020-05-27 NOTE — PROGRESS NOTE ADULT - ATTENDING COMMENTS
Patient seen and examined independently. I agree with the resident's note, physical exam, and plan except as below.  Vital Signs Last 24 Hrs  T(C): 36.1 (27 May 2020 12:35), Max: 36.7 (26 May 2020 20:17)  T(F): 96.9 (27 May 2020 12:35), Max: 98 (26 May 2020 20:17)  HR: 84 (27 May 2020 12:35) (71 - 91)  BP: 103/55 (27 May 2020 12:35) (92/54 - 118/57)  BP(mean): --  RR: 18 (27 May 2020 12:35) (16 - 18)  SpO2: 96% (27 May 2020 06:20) (96% - 96%)  PE  nad  aaox3  [id number] +sternotomy scar  right chest tesio  ctabl  softntnd+bs  no cce  +edema bilat hands/arms    #acute systolic chf   #rapid aflutter  #acute blood loss anemia   #hematoma sp renal biopsy  #RIC on CKD III started on HD  #bladder prolapse - has montejo placed here    - followed by heart failure Dr Gandara - requests repeat labs - possible IV iron  - cont diuresis as rxed   _rate controlled - hold AC due to hematoma  - ATN - scr stable  - outpt HD setup already    check iron studies - maybe inaccurate sp transfusion  - sp HD today  dc planning in 24-48 if cbc stable   discussed with team Patient seen and examined independently. I agree with the resident's note, physical exam, and plan except as below.  Vital Signs Last 24 Hrs  T(C): 36.1 (27 May 2020 12:35), Max: 36.7 (26 May 2020 20:17)  T(F): 96.9 (27 May 2020 12:35), Max: 98 (26 May 2020 20:17)  HR: 84 (27 May 2020 12:35) (71 - 91)  BP: 103/55 (27 May 2020 12:35) (92/54 - 118/57)  BP(mean): --  RR: 18 (27 May 2020 12:35) (16 - 18)  SpO2: 96% (27 May 2020 06:20) (96% - 96%)  PE  nad  aaox3  [id number] +sternotomy scar  right chest tesio  ctabl  softntnd+bs  no cce  +edema bilat hands/arms    #acute systolic chf   #rapid aflutter  #acute blood loss anemia   #hematoma sp renal biopsy  #RIC on CKD III started on HD  #bladder prolapse - has montejo placed here    - followed by heart failure Dr Gandara - requests repeat labs - possible IV iron  - cont diuresis as rxed   _rate controlled - hold AC due to hematoma  - ATN - scr stable  - outpt HD setup already    check iron studies - maybe inaccurate sp transfusion  - sp HD today  duplex negative  dc planning in 24-48 if cbc stable   discussed with team

## 2020-05-27 NOTE — PROGRESS NOTE ADULT - PROBLEM SELECTOR PLAN 1
Decreased LVEF from ~ 45 to 15% likely related to atrial flutter with RVR   Continue HD- s/p vascular access   Continue metoprolol XL 50 mg bid    Hydralazine 25 mg tid   Isosorbide dinitrate 10 mg tid  Increase torsemide to 20 mg bid  Strict I/Os  Daily weight  DC planning for tomorrow

## 2020-05-27 NOTE — PROGRESS NOTE ADULT - ASSESSMENT
1. RIC. Biopsy-proven ATN. Has underlying CKD secondary to diabetic nephropathy. Monitor I/Os. Daily BMP. Tunneled HD catheter placed. HD MWF schedule. HD today. Can start outpatient HD at [facility]: [address] [place] [address] on Friday at 9am..  2. UTI/sepsis. Completed nafcillin.  3. Hypotension. Off pressor support.  4. Anemia. On EPO and Venofer.

## 2020-05-27 NOTE — PROGRESS NOTE ADULT - SUBJECTIVE AND OBJECTIVE BOX
Interval history:  Patient remains fluid overloaded, s/p HD today. Hb 7.6.         HPI:    70 y/o F with h/o DM, CAD s/p CABG, AF/flutter not on A/C due to GI bleeding pending work up (NUBIA clipped during CABG), ICM, chronic systolic heart failure, with LVEF 20% > 45% > 25% now, admitted from from with complaint of fatigue, low blood pressure. Patient was found to be hypotensive to the 70s upon arrive for which she was started on pressors and admitted to ICU. Her mental status has remained intact. Creat was  4.9 with BUN 99. Upon admission patient was in atrial flutter with rate 130-140s. An echocardiogram showed drop in LV systolic function to ~ 30-35%.       PMH:  DM, stomach ca, bladder prolapse, heart failure    PSH: CABG     Social: Denies smoking or ETOH       Physical exam     General: AAO x3, no acute distress   Eyes: Clear eyes   Neck: Trachea is central, JVD+   Lungs: Decreased RS on left side  Heart: Regular heart sounds, + apical murmurs appreciated.    GI: Abdomen is soft, NT  Neuro: Normal strength  Psych: Calm affect

## 2020-05-27 NOTE — PROGRESS NOTE ADULT - SUBJECTIVE AND OBJECTIVE BOX
SUBJECTIVE:    Patient is a 69y old Female who presents with a chief complaint of sob (26 May 2020 10:49)    Currently admitted to medicine with the primary diagnosis of CHF (congestive heart failure)     Today is hospital day 16d. This morning she is resting comfortably in bed and reports no new issues or overnight events. She denies any CP ,palpitations, SOB, abdominal/flank pain.     PAST MEDICAL & SURGICAL HISTORY  Heart failure  Female bladder prolapse  Diabetes  S/P CABG (coronary artery bypass graft)  History of repair of hip fracture    SOCIAL HISTORY:  Negative for smoking/alcohol/drug use.     ALLERGIES:  No Known Allergies    MEDICATIONS:  STANDING MEDICATIONS  acetaminophen   Tablet .. 650 milliGRAM(s) Oral every 6 hours PRN Moderate Pain (4 - 6)  aluminum hydroxide/magnesium hydroxide/simethicone Suspension 15 milliLiter(s) Oral once PRN Dyspepsia  aMIOdarone    Tablet 200 milliGRAM(s) Oral daily  ascorbic acid 500 milliGRAM(s) Oral daily  aspirin  chewable 81 milliGRAM(s) Oral daily  atorvastatin 40 milliGRAM(s) Oral at bedtime  chlorhexidine 4% Liquid 1 Application(s) Topical <User Schedule>  dextrose 40% Gel 15 Gram(s) Oral once PRN Blood Glucose LESS THAN 70 milliGRAM(s)/deciliter  dextrose 50% Injectable 12.5 Gram(s) IV Push once  dextrose 50% Injectable 25 Gram(s) IV Push once  dextrose 50% Injectable 25 Gram(s) IV Push once  epoetin-mana-epbx (RETACRIT) Injectable 87905 Unit(s) IV Push <User Schedule>  glucagon  Injectable 1 milliGRAM(s) IntraMuscular once PRN Glucose LESS THAN 70 milligrams/deciliter  guaifenesin/dextromethorphan  Syrup 10 milliLiter(s) Oral every 6 hours PRN Cough  hydrALAZINE 25 milliGRAM(s) Oral three times a day  insulin glargine Injectable (LANTUS) 5 Unit(s) SubCutaneous at bedtime  insulin lispro (HumaLOG) corrective regimen sliding scale   SubCutaneous three times a day before meals  iron sucrose IVPB 100 milliGRAM(s) IV Intermittent once  isosorbide   dinitrate Tablet (ISORDIL) 10 milliGRAM(s) Oral three times a day  lactobacillus acidophilus 1 Tablet(s) Oral three times a day  loperamide 2 milliGRAM(s) Oral every 4 hours PRN Diarrhea  melatonin 3 milliGRAM(s) Oral at bedtime  metoprolol succinate ER 50 milliGRAM(s) Oral two times a day  pantoprazole    Tablet 40 milliGRAM(s) Oral before breakfast  sertraline 25 milliGRAM(s) Oral daily  sodium chloride 0.9% Bolus 250 milliLiter(s) IV Bolus once  tiotropium 18 MICROgram(s) Capsule 1 Capsule(s) Inhalation daily  torsemide 20 milliGRAM(s) Oral daily    PRN MEDICATIONS  acetaminophen   Tablet .. 650 milliGRAM(s) Oral every 6 hours PRN  aluminum hydroxide/magnesium hydroxide/simethicone Suspension 15 milliLiter(s) Oral once PRN  dextrose 40% Gel 15 Gram(s) Oral once PRN  glucagon  Injectable 1 milliGRAM(s) IntraMuscular once PRN  guaifenesin/dextromethorphan  Syrup 10 milliLiter(s) Oral every 6 hours PRN  loperamide 2 milliGRAM(s) Oral every 4 hours PRN    VITALS:   T(F): 96.1  HR: 71 (71 - 91)  BP: 92/54 (92/54 - 118/60)  RR: 18 (18 - 18)  SpO2: 96% (96% - 96%)    LABS:                        7.6    5.66  )-----------( 153      ( 27 May 2020 06:10 )             24.0     05-27    136  |  100  |  53<H>  ----------------------------<  97  4.6   |  26  |  4.9<HH>    Ca    8.5      27 May 2020 06:10  Mg     2.0     05-27    TPro  4.9<L>  /  Alb  2.8<L>  /  TBili  0.3  /  DBili  x   /  AST  9   /  ALT  <5  /  AlkPhos  48  05-27    PT/INR - ( 27 May 2020 06:10 )   PT: 12.20 sec;   INR: 1.06 ratio      PTT - ( 27 May 2020 06:10 )  PTT:33.8 sec    PHYSICAL EXAM:  GEN: In no acute distress. Pt. is awake in bed able to have a conversation.  LUNGS:  Symmetrical inspiration, no increased work of breathing; decrease BS BL bases  HEART: +S1,S2, RRR, No murmurs, Rubs, Gallops appreciated  ABD: Bowel Sounds Present, Soft, non tender, non distended, no guarding, no rebound. No flank tenderness.  EXT: BL LE pitting edema and LUE pitting edema. Extremities warm.   NEURO: AAOX3. No focal deficits.

## 2020-05-27 NOTE — PROGRESS NOTE ADULT - PROBLEM SELECTOR PLAN 2
Patient remains fluid overloaded. POC US yesterday showed LVEF ~ 15%  She remains oligo-anuric, going for HD today   Hold vasodilators as patient requires vasopressors   S/p DCCV currently in sinus rhythm  Start metoprolol 12.5 mg q6hrs  Strict I/Os
Urine output is minimal  Continue HD   Nephrology following
Urine output is minimal  Continue HD   Plan for kidney biopsy per nephrology   Patient needs vascular access   Nephrology following
Urine output remains minimal  Continue HD   S/p kidney biopsy pending results   S/p vascular access   Nephrology following
Urine output slightly improved   Kidney biopsy c/w ATN  Continue HD    S/p vascular access   Nephrology following

## 2020-05-27 NOTE — PROGRESS NOTE ADULT - PROBLEM SELECTOR PLAN 3
S/p DCCV, currently in sinus rhythm  Continue amiodarone   Continue A/C  Start metoprolol as above   EP following
S/p DCCV now in sinus rhythm   Continue A/C   BB for rate control
S/p DCCV now in sinus rhythm   Continue A/C   BB for rate control
S/p DCCV now in sinus rhythm   Continue heparin gtt   BB for rate control
S/p DCCV now in sinus rhythm   Continue heparin gtt   BB for rate control

## 2020-05-27 NOTE — PROGRESS NOTE ADULT - PROBLEM SELECTOR PROBLEM 1
Hematoma of right kidney, subsequent encounter
Acute on chronic systolic heart failure
Hematoma of right kidney, subsequent encounter
Hematoma of right kidney, subsequent encounter

## 2020-05-27 NOTE — PROGRESS NOTE ADULT - SUBJECTIVE AND OBJECTIVE BOX
[place] [facility] FOLLOW UP NOTE  --------------------------------------------------------------------------------  24 hour events/subjective: Patient examined during HD. Appears comfortable.    PAST HISTORY  --------------------------------------------------------------------------------  No significant changes to PMH, PSH, FHx, SHx, unless otherwise noted    ALLERGIES & MEDICATIONS  --------------------------------------------------------------------------------  Allergies    No Known Allergies    Intolerances      Standing Inpatient Medications  aMIOdarone    Tablet 200 milliGRAM(s) Oral daily  ascorbic acid 500 milliGRAM(s) Oral daily  aspirin  chewable 81 milliGRAM(s) Oral daily  atorvastatin 40 milliGRAM(s) Oral at bedtime  chlorhexidine 4% Liquid 1 Application(s) Topical <User Schedule>  dextrose 50% Injectable 12.5 Gram(s) IV Push once  dextrose 50% Injectable 25 Gram(s) IV Push once  dextrose 50% Injectable 25 Gram(s) IV Push once  epoetin-mana-epbx (RETACRIT) Injectable 21146 Unit(s) IV Push <User Schedule>  hydrALAZINE 25 milliGRAM(s) Oral three times a day  insulin glargine Injectable (LANTUS) 5 Unit(s) SubCutaneous at bedtime  insulin lispro (HumaLOG) corrective regimen sliding scale   SubCutaneous three times a day before meals  isosorbide   dinitrate Tablet (ISORDIL) 10 milliGRAM(s) Oral three times a day  lactobacillus acidophilus 1 Tablet(s) Oral three times a day  melatonin 3 milliGRAM(s) Oral at bedtime  metoprolol succinate ER 50 milliGRAM(s) Oral two times a day  pantoprazole    Tablet 40 milliGRAM(s) Oral before breakfast  sertraline 25 milliGRAM(s) Oral daily  sodium chloride 0.9% Bolus 250 milliLiter(s) IV Bolus once  tiotropium 18 MICROgram(s) Capsule 1 Capsule(s) Inhalation daily  torsemide 20 milliGRAM(s) Oral two times a day    PRN Inpatient Medications  acetaminophen   Tablet .. 650 milliGRAM(s) Oral every 6 hours PRN  aluminum hydroxide/magnesium hydroxide/simethicone Suspension 15 milliLiter(s) Oral once PRN  dextrose 40% Gel 15 Gram(s) Oral once PRN  glucagon  Injectable 1 milliGRAM(s) IntraMuscular once PRN  guaifenesin/dextromethorphan  Syrup 10 milliLiter(s) Oral every 6 hours PRN  loperamide 2 milliGRAM(s) Oral every 4 hours PRN    VITALS/PHYSICAL EXAM  --------------------------------------------------------------------------------  T(C): 36.1 (05-27-20 @ 12:35), Max: 36.7 (05-26-20 @ 20:17)  HR: 84 (05-27-20 @ 12:35) (71 - 91)  BP: 103/55 (05-27-20 @ 12:35) (92/54 - 118/60)  RR: 18 (05-27-20 @ 12:35) (16 - 18)  SpO2: 96% (05-27-20 @ 06:20) (96% - 96%)    05-26-20 @ 07:01  -  05-27-20 @ 07:00  --------------------------------------------------------  IN: 830 mL / OUT: 535 mL / NET: 295 mL    05-27-20 @ 07:01  -  05-27-20 @ 12:47  --------------------------------------------------------  IN: 0 mL / OUT: 1000 mL / NET: -1000 mL    Physical Exam:  	Gen: NAD  	Pulm: CTA B/L  	CV: RRR, S1S2  	Abd: +BS, soft, nontender/nondistended  	: No suprapubic tenderness  	LE: Warm,  edema  	Vascular access: TDC    LABS/STUDIES  --------------------------------------------------------------------------------              7.6    5.66  >-----------<  153      [05-27-20 @ 06:10]              24.0     136  |  100  |  53  ----------------------------<  97      [05-27-20 @ 06:10]  4.6   |  26  |  4.9        Ca     8.5     [05-27-20 @ 06:10]      Mg     2.0     [05-27-20 @ 06:10]    TPro  4.9  /  Alb  2.8  /  TBili  0.3  /  DBili  x   /  AST  9   /  ALT  <5  /  AlkPhos  48  [05-27-20 @ 06:10]    PT/INR: PT 12.20, INR 1.06       [05-27-20 @ 06:10]  PTT: 33.8       [05-27-20 @ 06:10]      Creatinine Trend:  SCr 4.9 [05-27 @ 06:10]  SCr 3.5 [05-26 @ 04:45]  SCr 5.2 [05-25 @ 06:46]  SCr 4.7 [05-24 @ 05:09]  SCr 3.9 [05-23 @ 06:05]    Urinalysis - [05-11-20 @ 22:30]      Color Yellow / Appearance Turbid / SG 1.005 / pH 7.0      Gluc Negative / Ketone Negative  / Bili Negative / Urobili <2 mg/dL       Blood Moderate / Protein 100 mg/dL / Leuk Est Large / Nitrite Negative      RBC 3 / WBC >720 / Hyaline 8 / Gran  / Sq Epi  / Non Sq Epi 3 / Bacteria Moderate      Iron 24, TIBC 204, %sat 12      [08-30-19 @ 07:16]  Ferritin 401      [05-19-20 @ 06:25]  HbA1c 5.7      [11-09-19 @ 01:20]  TSH 0.58      [08-02-19 @ 18:37]  Lipid: chol 153, , HDL 45, LDL 75      [08-30-19 @ 07:16]    HBsAb Nonreact      [05-19-20 @ 14:58]  HBsAg Nonreact      [05-19-20 @ 14:58]  HCV 0.33, Nonreact      [05-19-20 @ 14:58]    Free Light Chains: kappa 6.76, lambda 3.58, ratio = 1.89      [05-19 @ 04:50]  Immunofixation Serum:   No Monoclonal Band Identified    Reference Range: None Detected      [05-19-20 @ 04:50]  SPEP Interpretation: Hypogammaglobulinemia      [05-19-20 @ 04:50]

## 2020-05-27 NOTE — PHARMACOTHERAPY INTERVENTION NOTE - COMMENTS
amiodarone 1 mg/min x6 hrs, d/w medical team, recommended 0.5 mg/min x18 hrs after 1 mg/min completed
amiodarone load : 400mg po q8h x12 doses then 200mg q24h  -pt on amiodarone drip, d/w Dr Riggs to evaluate 400mg q8h regimen  -d/w EPS, will change to 400mg q12h x12 doses
d/w medical team to evaluate carvedilol, hydralazine & isosorbide, pt on norepinephrine drip  --will hold above meds for now
Spoke to prescriber dr. Kern .. Torsemide was recommended by dr. Gandara heart specialist.   Pt got a good response from drug.
ceftriaxone 500mg IV q12h, recommended 1g IV q24h
recommended nafcillin 2g IV q4h
vancomycin 750mg IV q12h, SCR 4.9, recommended 750mg IV q48h

## 2020-05-28 NOTE — PROGRESS NOTE ADULT - SUBJECTIVE AND OBJECTIVE BOX
[facility]Y FOLLOW UP NOTE  --------------------------------------------------------------------------------  24 hour events/subjective: Patient examined. Appears comfortable.    PAST HISTORY  --------------------------------------------------------------------------------  No significant changes to PMH, PSH, FHx, SHx, unless otherwise noted    ALLERGIES & MEDICATIONS  --------------------------------------------------------------------------------  Allergies    No Known Allergies    Standing Inpatient Medications  aMIOdarone    Tablet 200 milliGRAM(s) Oral daily  ascorbic acid 500 milliGRAM(s) Oral daily  aspirin  chewable 81 milliGRAM(s) Oral daily  atorvastatin 40 milliGRAM(s) Oral at bedtime  chlorhexidine 4% Liquid 1 Application(s) Topical <User Schedule>  dextrose 50% Injectable 12.5 Gram(s) IV Push once  dextrose 50% Injectable 25 Gram(s) IV Push once  dextrose 50% Injectable 25 Gram(s) IV Push once  epoetin-mana-epbx (RETACRIT) Injectable 31788 Unit(s) IV Push <User Schedule>  hydrALAZINE 25 milliGRAM(s) Oral three times a day  insulin glargine Injectable (LANTUS) 5 Unit(s) SubCutaneous at bedtime  insulin lispro (HumaLOG) corrective regimen sliding scale   SubCutaneous three times a day before meals  isosorbide   dinitrate Tablet (ISORDIL) 10 milliGRAM(s) Oral three times a day  lactobacillus acidophilus 1 Tablet(s) Oral three times a day  melatonin 3 milliGRAM(s) Oral at bedtime  metoprolol succinate ER 50 milliGRAM(s) Oral two times a day  pantoprazole    Tablet 40 milliGRAM(s) Oral before breakfast  sertraline 25 milliGRAM(s) Oral daily  sodium chloride 0.9% Bolus 250 milliLiter(s) IV Bolus once  tiotropium 18 MICROgram(s) Capsule 1 Capsule(s) Inhalation daily  torsemide 20 milliGRAM(s) Oral two times a day    PRN Inpatient Medications  acetaminophen   Tablet .. 650 milliGRAM(s) Oral every 6 hours PRN  aluminum hydroxide/magnesium hydroxide/simethicone Suspension 15 milliLiter(s) Oral once PRN  dextrose 40% Gel 15 Gram(s) Oral once PRN  glucagon  Injectable 1 milliGRAM(s) IntraMuscular once PRN  guaifenesin/dextromethorphan  Syrup 10 milliLiter(s) Oral every 6 hours PRN  loperamide 2 milliGRAM(s) Oral every 4 hours PRN      VITALS/PHYSICAL EXAM  --------------------------------------------------------------------------------  T(C): 36.7 (05-28-20 @ 12:44), Max: 36.8 (05-27-20 @ 18:28)  HR: 76 (05-28-20 @ 12:44) (76 - 89)  BP: 112/53 (05-28-20 @ 12:44) (108/52 - 112/54)  RR: 20 (05-28-20 @ 12:44) (16 - 20)  SpO2: 96% (05-28-20 @ 09:33) (93% - 96%)  Height (cm): 157.48 (05-27-20 @ 11:20)  Weight (kg): 56.8 (05-27-20 @ 11:20)  BMI (kg/m2): 22.9 (05-27-20 @ 11:20)  BSA (m2): 1.57 (05-27-20 @ 11:20)    05-27-20 @ 07:01  -  05-28-20 @ 07:00  --------------------------------------------------------  IN: 360 mL / OUT: 1175 mL / NET: -815 mL    05-28-20 @ 07:01  -  05-28-20 @ 13:03  --------------------------------------------------------  IN: 240 mL / OUT: 0 mL / NET: 240 mL    Physical Exam:  	Gen: NAD  	Pulm: CTA B/L  	CV: RRR, S1S2  	Abd: +BS, soft, nontender/nondistended  	: No suprapubic tenderness  	LE: Warm,  edema  	Vascular access: TDC    LABS/STUDIES  --------------------------------------------------------------------------------              7.2    5.69  >-----------<  151      [05-28-20 @ 06:10]              23.7     139  |  101  |  36  ----------------------------<  114      [05-28-20 @ 06:10]  4.7   |  28  |  3.7        Ca     8.3     [05-28-20 @ 06:10]      Mg     1.9     [05-28-20 @ 06:10]    TPro  4.8  /  Alb  2.8  /  TBili  0.5  /  DBili  x   /  AST  9   /  ALT  <5  /  AlkPhos  59  [05-28-20 @ 06:10]    PT/INR: PT 11.60, INR 1.01       [05-28-20 @ 06:10]  PTT: 34.9       [05-28-20 @ 06:10]    Creatinine Trend:  SCr 3.7 [05-28 @ 06:10]  SCr 4.9 [05-27 @ 06:10]  SCr 3.5 [05-26 @ 04:45]  SCr 5.2 [05-25 @ 06:46]  SCr 4.7 [05-24 @ 05:09]    Urinalysis - [05-11-20 @ 22:30]      Color Yellow / Appearance Turbid / SG 1.005 / pH 7.0      Gluc Negative / Ketone Negative  / Bili Negative / Urobili <2 mg/dL       Blood Moderate / Protein 100 mg/dL / Leuk Est Large / Nitrite Negative      RBC 3 / WBC >720 / Hyaline 8 / Gran  / Sq Epi  / Non Sq Epi 3 / Bacteria Moderate    Iron 55, TIBC 167, %sat 33      [05-27-20 @ 13:22]  Ferritin 636      [05-27-20 @ 13:22]  HbA1c 5.7      [11-09-19 @ 01:20]  TSH 0.58      [08-02-19 @ 18:37]  Lipid: chol 153, , HDL 45, LDL 75      [08-30-19 @ 07:16]    HBsAb Nonreact      [05-19-20 @ 14:58]  HBsAg Nonreact      [05-19-20 @ 14:58]  HCV 0.33, Nonreact      [05-19-20 @ 14:58]    Free Light Chains: kappa 6.76, lambda 3.58, ratio = 1.89      [05-19 @ 04:50]  Immunofixation Serum:   No Monoclonal Band Identified    Reference Range: None Detected      [05-19-20 @ 04:50]  SPEP Interpretation: Hypogammaglobulinemia      [05-19-20 @ 04:50]

## 2020-05-28 NOTE — PROGRESS NOTE ADULT - REASON FOR ADMISSION
sob

## 2020-05-28 NOTE — CHART NOTE - NSCHARTNOTEFT_GEN_A_CORE
<<<RESIDENT DISCHARGE NOTE>>>     GUS WILBURN  [id number]    Patient seen and examined. No acute events overnight. She feels well this morning. Denies CP, palpitations, SOB, abdominal pain. She will receive 1u PRBC then be discharged home to have outpatient dialysis start tomorrow.     VITAL SIGNS:  T(F): 98.5 (05-28-20 @ 13:20), Max: 98.5 (05-28-20 @ 13:20)  HR: 82 (05-28-20 @ 13:20)  BP: 113/58 (05-28-20 @ 13:20)  SpO2: 96% (05-28-20 @ 09:33)      T(C): 36.9 (05-28-20 @ 13:20), Max: 36.9 (05-28-20 @ 13:20)  HR: 82 (05-28-20 @ 13:20) (72 - 89)  BP: 113/58 (05-28-20 @ 13:20) (108/52 - 113/58)  RR: 20 (05-28-20 @ 13:20) (16 - 20)  SpO2: 96% (05-28-20 @ 09:33) (93% - 96%)    GENERAL: NAD, well-developed, 69y  EENT: EOMI, conjunctiva and sclera clear, No nasal obstruction or discharge  RESPIRATORY: Symmetrical inspiration. decreased breath sounds at bases.   CARDIOVASCULAR: Regular rate and rhythm; No murmurs, rubs, or gallops appreciated. BL LE pitting edema. LUE edema improving.   GASTROINTESTINAL: Abdomen Soft, Nontender, Nondistended  MUSCULOSKELETAL:  No cyanosis, extremities grossly symmetrical  PSYCH: AAOx3, affect appropriate  NEUROLOGY: non-focal, cognition grossly intact, MAEE    TEST RESULTS:                        7.2    5.69  )-----------( 151      ( 28 May 2020 06:10 )             23.7       05-28    139  |  101  |  36<H>  ----------------------------<  114<H>  4.7   |  28  |  3.7<H>    Ca    8.3<L>      28 May 2020 06:10  Mg     1.9     05-28    TPro  4.8<L>  /  Alb  2.8<L>  /  TBili  0.5  /  DBili  x   /  AST  9   /  ALT  <5  /  AlkPhos  59  05-28      FINAL DISCHARGE INTERVIEW:  Resident(s) Present: (Name:___Anthony__________), RN Present: (Name:  ___________)    DISCHARGE MEDICATION RECONCILIATION  reviewed with Attending (Name:__Heidi_________)    DISPOSITION:   [  ] Home,    [ x ] Home with Visiting Nursing Services,   [    ]  SNF/ NH,    [   ] Acute Rehab (4A),   [   ] Other (Specify:_________)

## 2020-05-28 NOTE — CHART NOTE - NSCHARTNOTEFT_GEN_A_CORE
Registered dietitian limited note     Pt. continues with good PO intake, tolerating renal rest, consistent carb diet. No acute GI distress noted, last BM 5/24. 3+ L, R wrist edema noted. Pressure ulcer stg I to sacrum unchanged. Weight trends relatively stable. No RD intervention at this time. Will continue to monitor pt./reassess in 7 days.

## 2020-05-28 NOTE — PROGRESS NOTE ADULT - SUBJECTIVE AND OBJECTIVE BOX
HOSPITALIST ATTENDING NOTE    GUS WILBURN  69y Female  [id number]    INTERVAL HPI/OVERNIGHT EVENTS: feels well, no sob, cp, palp    T(C): 36.2 (05-28-20 @ 05:00), Max: 36.8 (05-27-20 @ 18:28)  HR: 85 (05-28-20 @ 05:00) (82 - 89)  BP: 108/52 (05-28-20 @ 05:00) (103/55 - 112/54)  RR: 18 (05-28-20 @ 05:00) (16 - 18)  SpO2: 96% (05-28-20 @ 09:33) (93% - 96%)  Wt(kg): --    05-27-20 @ 07:01  -  05-28-20 @ 07:00  --------------------------------------------------------  IN: 360 mL / OUT: 1175 mL / NET: -815 mL    05-28-20 @ 07:01  -  05-28-20 @ 11:05  --------------------------------------------------------  IN: 240 mL / OUT: 0 mL / NET: 240 mL          PE  nad  [id number]  [id number] +sternotomy scar  right chest tesio  ctabl  softntnd+bs  no cce  +edema bilat hands/arms        Consultant(s) Notes Reviewed:  [x ] YES  [ ] NO  Care Discussed with Consultants/Other Providers/ Housestaff [ x] YES  [ ] NO    LABS:  Hemoglobin: 7.2 g/dL (05-28 @ 06:10)  Hemoglobin: 7.6 g/dL (05-27 @ 06:10)  Hemoglobin: 7.9 g/dL (05-26 @ 04:45)  Hemoglobin: 8.2 g/dL (05-25 @ 06:46)  Hemoglobin: 8.5 g/dL (05-24 @ 16:41)                          7.2    5.69  )-----------( 151      ( 28 May 2020 06:10 )             23.7     05-28    139  |  101  |  36<H>  ----------------------------<  114<H>  4.7   |  28  |  3.7<H>    Ca    8.3<L>      28 May 2020 06:10  Mg     1.9     05-28    TPro  4.8<L>  /  Alb  2.8<L>  /  TBili  0.5  /  DBili  x   /  AST  9   /  ALT  <5  /  AlkPhos  59  05-28          RADIOLOGY & ADDITIONAL TESTS:    Imaging or report Personally Reviewed:  [ ] YES  [ ] NO    Case discussed with resident    Care discussed with pt/family      HEALTH ISSUES - PROBLEM Dx:  Anemia, unspecified type: Anemia, unspecified type  Hematoma of right kidney, subsequent encounter: Hematoma of right kidney, subsequent encounter  Acute on chronic systolic heart failure: Acute on chronic systolic heart failure  RIC (acute kidney injury): RIC (acute kidney injury)  Urinary tract infection without hematuria, site unspecified: Urinary tract infection without hematuria, site unspecified  Atrial flutter, unspecified type: Atrial flutter, unspecified type  Acute on chronic systolic (congestive) heart failure: Acute on chronic systolic (congestive) heart failure  Shock: Shock

## 2020-05-28 NOTE — PROGRESS NOTE ADULT - ASSESSMENT
1. RIC. Biopsy-proven ATN. Has underlying CKD secondary to diabetic nephropathy. Monitor I/Os. Daily BMP. Tunneled HD catheter placed. HD MWF schedule. HD tomorrow. Can start outpatient HD at [facility]: [address] [place] [address] on Friday at 9am.  2. UTI/sepsis. Completed nafcillin.  3. Hypotension. Off pressor support.  4. Anemia. On EPO and Venofer. Plan for transfusion today.

## 2020-05-28 NOTE — PROGRESS NOTE ADULT - ASSESSMENT
#acute systolic chf   #rapid aflutter  #acute blood loss anemia   #hematoma sp renal biopsy  #RIC on CKD III started on HD  #bladder prolapse - has montejo placed here    - followed by heart failure Dr Gandara - torsemide q12 now  - cont diuresis as rxed   _rate controlled - hold AC due to hematoma (hgb dropped when restarted)  - ATN - scr stable  - outpt HD setup already  -Hgb dropped to 7.2 today - will transfuse one unit prbc - monitor for fluid overload  - sp HD yesterday - next on fri  duplex negative  discussed with Dr Peguero - nephro  called daughter- left message  stable to dc home after prbc transfusion if stable  discussed with team and cm

## 2020-06-01 PROBLEM — I50.9 HEART FAILURE, UNSPECIFIED: Chronic | Status: ACTIVE | Noted: 2020-05-11

## 2020-06-05 PROBLEM — E55.9 VITAMIN D DEFICIENCY: Status: ACTIVE | Noted: 2020-01-01

## 2020-06-11 NOTE — HISTORY OF PRESENT ILLNESS
[FreeTextEntry1] : 70 y/o F with h/o DM, CAD s/p CABG+ NUBIA clipping, ICM, chronic systolic HF, CKD, recently discharged from the hospital after admitted with decompensated HF and RIC. She was started on hemodialysis. Patient is coming today for follow up. She remains very weak but slight better than before. She is tolerating HD three times per week; no episodes of hypotension during HD reported. She make minimal urine.

## 2020-06-17 NOTE — HISTORY OF PRESENT ILLNESS
[FreeTextEntry1] : \par RN\par \par Patient is present for montejo change\par Last seen 4//3/2020 for montejo change\par \par H/o DM\par H/o MI\par H/o neuropathy, on Neurontin\par \par s/p CABG\par \par Failed pessary fitting \par Estrace cream for atrophy\par s/p Baclofen (hallucinations)\par s/p Flomax 0.4 mg (ANNA)\par \par Today, patient states she is doing well and states she continues to wish to move forward with surgery.

## 2020-06-17 NOTE — COUNSELING
[FreeTextEntry1] : \par Please call our office if you should experience a fever greater than 100.4.\par \par Please continue to apply a pea size amount of the cream to the opening of the vagina three nights per week. \par \par Please return to the office in 1 month for voiding trial/Castellon change \par \par Please schedule an appointment with Dr. Rodriguez for surgical counseling\par

## 2020-06-17 NOTE — DISCUSSION/SUMMARY
[FreeTextEntry1] : \par Urinary retention-\par Patient placed into lithotomy position. 14 F montejo removed without difficulty with 50 cc of clear urine drained from her bag.\par \par 14 Fr montejo catheter placed without difficulty using sterile technique for a return of 10 cc of clear yellow urine. Instilled 10 cc of sterile water into the balloon. Leg bag attached without difficulty. Precautions provided.\par \par Patient will return in 1 month for her next montejo change.

## 2020-07-03 NOTE — CONSULT NOTE ADULT - CONSTITUTIONAL DETAILS
Left message on Fina's Voicemail. We are able to get Fina in sooner at the [facility]. Suzy gave approval to book in an EMG appointment slot.  Appointment needs to be 60 minutes. Dr. Del Real has openings on 8/4/2020.   well-developed/no distress

## 2020-07-06 PROBLEM — I48.0 PAROXYSMAL ATRIAL FIBRILLATION: Status: ACTIVE | Noted: 2019-10-26

## 2020-07-06 NOTE — HISTORY OF PRESENT ILLNESS
[FreeTextEntry1] : 70 y/o F with h/o DM, CAD s/p CABG+ NUBIA clipping, ICM, chronic systolic HF, CKD, recently discharged from the hospital after admitted with decompensated HF and RIC. She was started on hemodialysis. Patient is coming today for follow up. She states better than before. She is tolerating HD three times per week; no episodes of hypotension during HD reported. \par

## 2020-07-10 NOTE — ED PROVIDER NOTE - OBJECTIVE STATEMENT
69 y.o F w/ pmhx CAD s/p CABG, CHG, ESRD MWF, chronic L pleural eff, bladder prolapse s/p chronic montejo, DM p/w fall yesterday on the bathtub mechanical, landing on the tub edge with her left thorax. + thorax wall pain, no sob, no cp, no n/v, no abd pain, no dizziness, no ht, no no loc, no ac.

## 2020-07-10 NOTE — ED PROVIDER NOTE - PROGRESS NOTE DETAILS
jayme - s/o to Dr. Wheeler, pending CT reads and trauma eval. pt aware of all labs and imaging, fractures noted, trauma evaluted pt, report admission to their service. gcs 15, saturation 96 on RA, no resp distress.

## 2020-07-10 NOTE — H&P ADULT - HISTORY OF PRESENT ILLNESS
Patient is a 69 year-old female with PMHx of CAD s/p CABG (November 2019), CHF (EF 40%), ESRD on hemodialysis MWF, chronic pleural effusions on the left for which she get thoracentesis periodically, bladder prolapse requiring chronic Castellon placed 6 weeks ago, Diabetes Type 2 on insulin. She presents after mechanical ground level fall yesterday, where she tripped while trying to put on her pants and fell hitting her left side on the edge of the bathtub. She complains of sharp pain to her left rib cage and back. She denies any LOC or other injuries. Patient reported to an urgent care where CXR showed displaced left-sided rib fractures. She was transferred to [facility] where CT scan shows mildly displaced left anterolateral 6th-9th and nondisplaced left posterior 8th-11th rib fractures. IS on admission is 500 mL.     Patient also has PMHx significant for ESRD, and has been on HD MWF for the past two months via right Tesio dialysis catheter. She reports her dry weight is 112 pounds. She also has had urinary retention secondary to bladder prolapse and has had a chronic Castellon for 6 weeks. She says her DM is well controlled and she is compliant with her insulin. She has CHF and complains of intermittent lower leg edema, sometimes worse on one side than the other but not always the same side.

## 2020-07-10 NOTE — H&P ADULT - RESPIRATORY COMMENTS
chest wall tenderness on left posterior and lateral chest wall chest wall tenderness on left posterior and lateral chest wall. Tesio right chest wall

## 2020-07-10 NOTE — H&P ADULT - ASSESSMENT
69 year-old female with PMHx of CAD s/p CABG (November 2019), CHF (EF 40%), ESRD on hemodialysis MWF, chronic pleural effusions on the left for which she get thoracentesis periodically, bladder prolapse requiring chronic Castellon placed 6 weeks ago, Diabetes Type 2 on insulin s/p mechanical ground level fall 1 day prior to arrival     Injuries:   Acute mildly displaced left anterolateral 6th-9th and nondisplaced left posterior 8th-11th rib fractures    Plan:   Nephrology consult to Dr. Saenz to resume dialysis on Monday  AM CXR and trend Hgb daily   Continue IS (500 mL on admission)  Exchange chronic Castellon catheter  Will start ADA diet   DVT ppx  GI ppx  Pain control 69 year-old female with PMHx of CAD s/p CABG (November 2019), CHF (EF 40%), ESRD on hemodialysis MWF, chronic pleural effusions on the left for which she get thoracentesis periodically, bladder prolapse requiring chronic Castellon placed 6 weeks ago, Diabetes Type 2 on insulin s/p mechanical ground level fall 1 day prior to arrival     Injuries:   Acute mildly displaced left anterolateral 6th-9th and nondisplaced left posterior 8th-11th rib fractures    Plan:   Nephrology consult to Dr. Saenz to resume dialysis on Monday  AM CXR and trend Hgb daily   Continue IS (500 mL on admission)  Exchange chronic Castellon catheter  Will start ADA diet   DVT ppx  GI ppx  Pain control    Senior Resident Addendum  69F with extensive PMH s/p mechanical fall, striking the left side of her chest on the edge of the bathtub, presenting to the ED from  after findings of multiple rib fractures, with CT CAP findings of Acute mildly displaced left anterolateral 6th-9th and nondisplaced left posterior 8th-11th rib fractures, currently pulling 500 on IS. SICU consult in setting of age and injuries sustained, nephrology for dialysis, PT/rehab, physiatry, pain control. Case discussed with Dr. Gutierrez, patient, patient's family, ED, ICU, trauma team.

## 2020-07-10 NOTE — H&P ADULT - ATTENDING COMMENTS
Patient seen and examined with surgery team in Ed awaiting admission and discussed management plans. Patient fell last night and did not wanted to stay in hospital On dialysis missed today. significant localized pain left chest with decreased inspiration. Tesio for dialysis right chest wall with well healed sternotomy scar. Awaiting ICU team evaluation

## 2020-07-10 NOTE — ED PROVIDER NOTE - SHIFT CHANGE DETAILS
: 68yo F with PMHx CAD/CABG, CHF (EF 40%), ESRD/HD (MWF, Dr Peguero), bladder prolapse, chronic pleural effusion with h/o left thoracentesis, saturation today 96, presented s/p fall yesterday night, was sent [place] [facility] [place] noted for fractures of the left sixth through ninth ribs with displacement. No PTX, pt with pain with L sided ribs, trauma aware, evaluating pt, GCS 15, no AC. pending EKG, labs reviewed hgb 10, previous 72, 7.6, compared in may 2020, inr 1.12, K 5.7 on serum , hemolyzed, 5.1 on vbg, pending imaging results trauma consult, pt aware, will continue to monitor and reassess.

## 2020-07-10 NOTE — H&P ADULT - NSICDXPASTSURGICALHX_GEN_ALL_CORE_FT
PAST SURGICAL HISTORY:  History of repair of hip fracture     History of thoracentesis     S/P CABG (coronary artery bypass graft)

## 2020-07-10 NOTE — H&P ADULT - MUSCULOSKELETAL COMMENTS
decreased ROM of left shoulder secondary to pain, no tenderness to palpation or visual signs of trauma

## 2020-07-10 NOTE — ED PROVIDER NOTE - PMH
Congestive heart failure (CHF)    Diabetes    Female bladder prolapse    Heart failure    Pleural effusion due to congestive heart failure

## 2020-07-10 NOTE — ED ADULT NURSE NOTE - CHIEF COMPLAINT QUOTE
pt prtesents to ED s/p flal yesterday [place] [facility] [place] pt on aspirin ; denies hitting head denies LOC; pt suppose to drecieve dialysis today

## 2020-07-10 NOTE — H&P ADULT - NSICDXPASTMEDICALHX_GEN_ALL_CORE_FT
PAST MEDICAL HISTORY:  Congestive heart failure (CHF)     Diabetes     Female bladder prolapse     Heart failure     Pleural effusion due to congestive heart failure

## 2020-07-10 NOTE — ED ADULT NURSE NOTE - NSIMPLEMENTINTERV_GEN_ALL_ED
Implemented All Fall with Harm Risk Interventions:  [place] to call system. Call bell, personal items and telephone within reach. Instruct patient to call for assistance. Room bathroom lighting operational. Non-slip footwear when patient is off stretcher. Physically safe environment: no spills, clutter or unnecessary equipment. Stretcher in lowest position, wheels locked, appropriate side rails in place. Provide visual cue, wrist band, yellow gown, etc. Monitor gait and stability. Monitor for mental status changes and reorient to person, place, and time. Review medications for side effects contributing to fall risk. Reinforce activity limits and safety measures with patient and family. Provide visual clues: red socks.

## 2020-07-10 NOTE — ED PROVIDER NOTE - PSH
History of repair of hip fracture    History of thoracentesis    S/P CABG (coronary artery bypass graft)

## 2020-07-10 NOTE — ED PROVIDER NOTE - PHYSICAL EXAMINATION
CONSTITUTIONAL: well-appearing, in NAD, GCS 15  SKIN: Warm dry, normal skin turgor, no abrasions or lacerations.  HEAD: NCAT  EYES: EOMI, PERRLA, no scleral icterus, conjunctiva pink  ENT: no epistaxis or blood in the oropharynx  NECK: Supple; non tender. Full ROM. trachea midline.  CARD: RRR, no murmurs.  THORAX: L lateral chest wall tenderness.  RESP: b/l breath sounds CTABL  ABD: soft, non-tender, non-distended, no rebound or guarding.  EXT: Full ROM, no bony tenderness, no pedal edema, no calf tenderness, stable pelvis, no spinal back tenderness. peripheral pulses intact and symmetrical.  NEURO: moving all extremities equally.   PSYCH: Cooperative, appropriate.

## 2020-07-10 NOTE — CONSULT NOTE ADULT - SUBJECTIVE AND OBJECTIVE BOX
SICU Consultation Note  ===========================  GUS WILBURN          69y           Emergency    HPI:    69 year-old female with acute left rib fx; ribs 6-9 ribs (chau-lateral), left posterior ribs 8-11. The patient had a ground level fall yesterday evening while getting dressed after a shower. She hit the left side of her chest on the edge of the bathtub. Developed sharped left pain in the chest, worsening with deep inspiration. The patient went to [facility] urgent care in which plain film imaging was performed. Patient recently started on hemodialysis (scheduled MWF) with Dr. Peguero, patient instructed to skip her Friday hemodialysis appointment and come to the ED for further evaluation.  IS on admission is 500 mL.     PMH notable for ESRD on hemodialysis MWF, chronic pleural effusions on the left for which she get thoracentesis periodically, bladder prolapse requiring chronic Castellon placed 6 weeks ago, Diabetes Type 2 on insulin. Bladder prolapse requiring chronic Castellon placed 6 weeks ago. Patient sees Dr. Nadeen Rodriguez of [facility].    PSH notable for CAD s/p CABG x vessels (November 2019), CHF (EF 20-25% with severely decreased LV function, moderate to severe mitral regurg, on 5/17/2020), ESRD on hemodialysis MWF with tesio catheter placement (placed by IR).  chronic pleural effusions on the left for which she get thoracentesis periodically,     Allergies: NKDA    Social History: Former smoker (quit ~15 years ago). Lives with .    RELEVANT IMAGING:     Acute mildly displaced left anterolateral 6th-9th and nondisplaced left posterior 8th-11th rib fractures; no pneumothorax.     2. Cardiomegaly with trace right and small left pleural effusions and bilateral interlobular septal thickening, suggestive of interstitial edema.    ROS:    REVIEW OF SYSTEMS    [x] A ten-point review of systems was otherwise negative except as noted.  [ ] Due to altered mental status/intubation, subjective information were not able to be obtained from the patient. History was obtained, to the extent possible, from review of the chart and collateral sources of information.    ------------------------------------------------------------------------------------    PHYSICAL EXAM:    GENERAL: thin, frail. Cooperative. No acute distress    HEENT:   Head atraumatic, normocephalic  Eyes EOMI, PERRL, conjunctiva and sclera clear  Moist mucous membranes  Neck supple without adenopathy    NERVOUS SYSTEM:  A&Ox3, no focal deficits. Motor function is normal with muscle strength 4/5 L hip flexor, 5/5 R hip flexors 5/5     CARDIAC: Normotensive, nontachycardic.     LUNGS: Unlabored respirations.  C    ABDOMEN: No masses or discoloration. Soft, nontender, nondistended, +BS    GENITOURINARY: Castellon is in place; urine color, urine output     EXTREMITIES: 2+ peripheral pulses bilaterally UE/LE. No clubbing, cyanosis, or edema    SKIN: No rashes or lesions    Tubes/Lines/Drains  ***  [x] Peripheral IV  [] Central Venous Line     	[] R	[] L	[] IJ	[] Fem	[] SC        Type:	    Date Placed:   [] Arterial Line		[] R	[] L	[] Fem	[] Rad	[] Ax	Date Placed:   [] PICC:         	[] Midline		[] Mediport   [] Urinary Catheter		Date Placed:   [] NGT  [] ALEE Drain   ------------------------------------------------------------------------------------  ADVANCE DIRECTIVES: Presumed full code  ------------------------------------------------------------------------------------  MEDICAL HISTORY:    PAST MEDICAL & SURGICAL HISTORY:  Pleural effusion due to congestive heart failure  Congestive heart failure (CHF)  Female bladder prolapse  Heart failure  Diabetes  History of thoracentesis  S/P CABG (coronary artery bypass graft)  History of repair of hip fracture    ------------------------------------------------------------------------------------  Home Meds: Home Medications:  ascorbic acid 500 mg oral tablet: 1 tab(s) orally once a day (19 Nov 2019 13:03)  aspirin 81 mg oral tablet, chewable: 1 tab(s) orally once a day (19 Nov 2019 13:03)  atorvastatin 40 mg oral tablet: 1 tab(s) orally once a day (at bedtime) (19 Nov 2019 13:03)  lactobacillus acidophilus oral capsule: 1 tab(s) orally 3 times a day (19 Nov 2019 13:03)  sertraline 25 mg oral tablet: 1 tab(s) orally once a day (19 Nov 2019 13:03)    ------------------------------------------------------------------------------------  Allergies: Allergies    No Known Allergies    Intolerances    -----------------------------------------------------------------------------------  CURRENT MEDICAL HISTORY:    CURRENT MEDICATIONS:     Neurologic Medications  acetaminophen   Tablet .. 650 milliGRAM(s) Oral every 6 hours  ondansetron Injectable 4 milliGRAM(s) IV Push every 6 hours PRN Nausea    Respiratory Medications    Cardiovascular Medications    Gastrointestinal Medications  senna 2 Tablet(s) Oral at bedtime PRN Constipation    Genitourinary Medications    Hematologic/Oncologic Medications  heparin   Injectable 5000 Unit(s) SubCutaneous every 8 hours    Antimicrobial/Immunologic Medications    Endocrine/Metabolic Medications  glucagon  Injectable 1 milliGRAM(s) IntraMuscular once PRN Glucose LESS THAN 70 milligrams/deciliter  insulin lispro (HumaLOG) corrective regimen sliding scale   SubCutaneous three times a day before meals    Topical/Other Medications    -------------------------------------------------------------------------------------  VITAL SIGNS, INS/OUTS (last 24 hours):    I&O's Summary    --------------------------------------------------------------------------------------  LABS:    Labs:  CAPILLARY BLOOD GLUCOSE      POCT Blood Glucose.: 131 mg/dL (10 Jul 2020 17:28)                          10.0   6.01  )-----------( 181      ( 10 Jul 2020 14:26 )             31.5       Auto Neutrophil %: 73.1 % (07-10-20 @ 14:26)  Auto Immature Granulocyte %: 0.5 % (07-10-20 @ 14:26)    07-10    135  |  94<L>  |  88<HH>  ----------------------------<  95  5.7<H>   |  20  |  4.8<HH>      Calcium, Total Serum: 9.9 mg/dL (07-10-20 @ 14:26)      LFTs:             7.3  | 0.3  | 18       ------------------[107     ( 10 Jul 2020 14:26 )  4.7  | x    | 8           Lipase:x      Amylase:x         Blood Gas Venous - Lactate: 1.0 mmoL/L (07-10-20 @ 14:26)      Coags:     12.90  ----< 1.12    ( 10 Jul 2020 14:26 )     34.8                      --------------------------------------------------------------------------------------  CRITICAL CARE DIAGNOSES:   -------------------------------------------------------------------------------------- SICU Consultation Note  ===========================  GUS WILBURN          69y           Emergency    HPI:    69 year-old female with acute left rib fx; ribs 6-9 ribs (chau-lateral), left posterior ribs 8-11. The patient had a ground level fall yesterday evening while getting dressed after a shower. She hit the left side of her chest on the edge of the bathtub. Developed sharped left pain in the chest, worsening with deep inspiration. The patient went to [facility] urgent care in which plain film imaging was performed. Patient recently started on hemodialysis (scheduled MWF) with Dr. Peguero, patient instructed to skip her Friday hemodialysis appointment and come to the ED for further evaluation.  IS on admission is 500 mL.     PMH notable for ESRD on hemodialysis MWF, chronic pleural effusions on the left for which she get thoracentesis periodically, bladder prolapse requiring chronic Castellon placed 6 weeks ago, Diabetes Type 2 on insulin. Bladder prolapse requiring chronic Castellon placed 6 weeks ago. Patient sees Dr. Nadeen Rodriguez of [facility].    PSH notable for CAD s/p CABG x vessels (November 2019), CHF (EF 20-25% with severely decreased LV function, moderate to severe mitral regurg, on 5/17/2020), ESRD on hemodialysis MWF with tesio catheter placement (placed by IR).  chronic pleural effusions on the left for which she get thoracentesis periodically,     Allergies: NKDA    Social History: Former smoker (quit ~15 years ago). Lives with .    RELEVANT IMAGING:     Acute mildly displaced left anterolateral 6th-9th and nondisplaced left posterior 8th-11th rib fractures; no pneumothorax.     2. Cardiomegaly with trace right and small left pleural effusions and bilateral interlobular septal thickening, suggestive of interstitial edema.    ROS:    REVIEW OF SYSTEMS    [x] A ten-point review of systems was otherwise negative except as noted.  [ ] Due to altered mental status/intubation, subjective information were not able to be obtained from the patient. History was obtained, to the extent possible, from review of the chart and collateral sources of information.    ------------------------------------------------------------------------------------    PHYSICAL EXAM:    GENERAL: thin, frail. Cooperative. No acute distress    HEENT:   Head atraumatic, normocephalic  Eyes EOMI, PERRL, conjunctiva and sclera clear  Moist mucous membranes  Neck supple without adenopathy    NERVOUS SYSTEM:  A&Ox3, no focal deficits. Motor function is normal with muscle strength 4/5 L hip flexor, 5/5 R hip flexors 5/5     CARDIAC: Normotensive, nontachycardic.     LUNGS: Unlabored respirations.  C    ABDOMEN: No masses or discoloration. Soft, nontender, nondistended, +BS    GENITOURINARY: Castellon is in place; urine color, urine output     EXTREMITIES: 2+ peripheral pulses bilaterally UE/LE. No clubbing, cyanosis, or edema    SKIN: No rashes or lesions    Tubes/Lines/Drains  ***  [x] Peripheral IV  [] Central Venous Line     	[] R	[] L	[] IJ	[] Fem	[] SC        Type:	    Date Placed:   [] Arterial Line		[] R	[] L	[] Fem	[] Rad	[] Ax	Date Placed:   [] PICC:         	[] Midline		[] Mediport   [] Urinary Catheter		Date Placed:   [] NGT  [] ALEE Drain   ------------------------------------------------------------------------------------  ADVANCE DIRECTIVES: Presumed full code  ------------------------------------------------------------------------------------  MEDICAL HISTORY:    PAST MEDICAL & SURGICAL HISTORY:  Pleural effusion due to congestive heart failure  Congestive heart failure (CHF)  Female bladder prolapse  Heart failure  Diabetes  History of thoracentesis  S/P CABG (coronary artery bypass graft)  History of repair of hip fracture    ------------------------------------------------------------------------------------  Home Meds: Home Medications:  ascorbic acid 500 mg oral tablet: 1 tab(s) orally once a day (19 Nov 2019 13:03)  aspirin 81 mg oral tablet, chewable: 1 tab(s) orally once a day (19 Nov 2019 13:03)  atorvastatin 40 mg oral tablet: 1 tab(s) orally once a day (at bedtime) (19 Nov 2019 13:03)  lactobacillus acidophilus oral capsule: 1 tab(s) orally 3 times a day (19 Nov 2019 13:03)  sertraline 25 mg oral tablet: 1 tab(s) orally once a day (19 Nov 2019 13:03)    ------------------------------------------------------------------------------------  Allergies: Allergies    No Known Allergies    Intolerances    -----------------------------------------------------------------------------------  CURRENT MEDICAL HISTORY:    CURRENT MEDICATIONS:     Neurologic Medications  acetaminophen   Tablet .. 650 milliGRAM(s) Oral every 6 hours  ondansetron Injectable 4 milliGRAM(s) IV Push every 6 hours PRN Nausea    Respiratory Medications    Cardiovascular Medications    Gastrointestinal Medications  senna 2 Tablet(s) Oral at bedtime PRN Constipation    Genitourinary Medications    Hematologic/Oncologic Medications  heparin   Injectable 5000 Unit(s) SubCutaneous every 8 hours    Antimicrobial/Immunologic Medications    Endocrine/Metabolic Medications  glucagon  Injectable 1 milliGRAM(s) IntraMuscular once PRN Glucose LESS THAN 70 milligrams/deciliter  insulin lispro (HumaLOG) corrective regimen sliding scale   SubCutaneous three times a day before meals    Topical/Other Medications    -------------------------------------------------------------------------------------  VITAL SIGNS, INS/OUTS (last 24 hours):    I&O's Summary    --------------------------------------------------------------------------------------  LABS:    Labs:  CAPILLARY BLOOD GLUCOSE    POCT Blood Glucose.: 131 mg/dL (10 Jul 2020 17:28)                          10.0   6.01  )-----------( 181      ( 10 Jul 2020 14:26 )             31.5       Auto Neutrophil %: 73.1 % (07-10-20 @ 14:26)  Auto Immature Granulocyte %: 0.5 % (07-10-20 @ 14:26)    07-10    135  |  94<L>  |  88<HH>  ----------------------------<  95  5.7<H>   |  20  |  4.8<HH>      Calcium, Total Serum: 9.9 mg/dL (07-10-20 @ 14:26)      LFTs:             7.3  | 0.3  | 18       ------------------[107     ( 10 Jul 2020 14:26 )  4.7  | x    | 8           Lipase:x      Amylase:x         Blood Gas Venous - Lactate: 1.0 mmoL/L (07-10-20 @ 14:26)    Coags:     12.90  ----< 1.12    ( 10 Jul 2020 14:26 )     34.8      --------------------------------------------------------------------------------------  CRITICAL CARE DIAGNOSES:   --------------------------------------------------------------------------------------     Acute left rib fx; ribs 6-9 ribs (chau-lateral), left posterior ribs 8-11 SICU Consultation Note  ===========================  GUS WILBURN          69y           Emergency    HPI:    69 year-old female with acute left rib fx; ribs 6-9 ribs (chau-lateral), left posterior ribs 8-11. The patient had a ground level fall yesterday evening while getting dressed after a shower. She hit the left side of her chest on the edge of the bathtub. Developed sharped left pain in the chest, worsening with deep inspiration. The patient went to [facility] urgent care in which plain film imaging was performed. Patient recently started on hemodialysis (scheduled MWF) with Dr. Peguero, patient instructed to skip her Friday hemodialysis appointment and come to the ED for further evaluation.  IS on admission is 500 mL.     PMH notable for ESRD on hemodialysis MWF, chronic pleural effusions on the left for which she get thoracentesis periodically, bladder prolapse requiring chronic Castellon placed 6 weeks ago, Diabetes Type 2 on insulin. Patient sees Dr. Nadeen Rodriguez of [facility].    PSH notable for CAD s/p CABG x vessels (November 2019), CHF (EF 20-25% with severely decreased LV function, moderate to severe mitral regurg, on 5/17/2020), ESRD on hemodialysis MWF with tesio catheter placement (placed by IR).  chronic pleural effusions on the left for which she get thoracentesis periodically,     Allergies: NKDA    Social History: Former smoker (quit ~15 years ago). Lives with .    RELEVANT IMAGING:     Acute mildly displaced left anterolateral 6th-9th and nondisplaced left posterior 8th-11th rib fractures; no pneumothorax.     2. Cardiomegaly with trace right and small left pleural effusions and bilateral interlobular septal thickening, suggestive of interstitial edema.    ROS:    REVIEW OF SYSTEMS    [x] A ten-point review of systems was otherwise negative except as noted.  [ ] Due to altered mental status/intubation, subjective information were not able to be obtained from the patient. History was obtained, to the extent possible, from review of the chart and collateral sources of information.    ------------------------------------------------------------------------------------    PHYSICAL EXAM:    GENERAL: thin, frail. Cooperative. No acute distress    HEENT:   Head atraumatic, normocephalic  Eyes EOMI, PERRL, conjunctiva and sclera clear  Moist mucous membranes  Neck supple without adenopathy    NERVOUS SYSTEM:  A&Ox3, no focal deficits. Motor function is normal with muscle strength 4/5 L hip flexor, 5/5 R hip flexors 5/5     CARDIAC: Normotensive, nontachycardic.     LUNGS: Unlabored respirations.  C    ABDOMEN: No masses or discoloration. Soft, nontender, nondistended, +BS    GENITOURINARY: Castellon is in place; urine color, urine output     EXTREMITIES: 2+ peripheral pulses bilaterally UE/LE. No clubbing, cyanosis, or edema    SKIN: No rashes or lesions    Tubes/Lines/Drains  ***  [x] Peripheral IV  [] Central Venous Line     	[] R	[] L	[] IJ	[] Fem	[] SC        Type:	    Date Placed:   [] Arterial Line		[] R	[] L	[] Fem	[] Rad	[] Ax	Date Placed:   [] PICC:         	[] Midline		[] Mediport   [] Urinary Catheter		Date Placed:   [] NGT  [] ALEE Drain   ------------------------------------------------------------------------------------  ADVANCE DIRECTIVES: Presumed full code  ------------------------------------------------------------------------------------  MEDICAL HISTORY:    PAST MEDICAL & SURGICAL HISTORY:  Pleural effusion due to congestive heart failure  Congestive heart failure (CHF)  Female bladder prolapse  Heart failure  Diabetes  History of thoracentesis  S/P CABG (coronary artery bypass graft)  History of repair of hip fracture    ------------------------------------------------------------------------------------  Home Meds: Home Medications:  ascorbic acid 500 mg oral tablet: 1 tab(s) orally once a day (19 Nov 2019 13:03)  aspirin 81 mg oral tablet, chewable: 1 tab(s) orally once a day (19 Nov 2019 13:03)  atorvastatin 40 mg oral tablet: 1 tab(s) orally once a day (at bedtime) (19 Nov 2019 13:03)  lactobacillus acidophilus oral capsule: 1 tab(s) orally 3 times a day (19 Nov 2019 13:03)  sertraline 25 mg oral tablet: 1 tab(s) orally once a day (19 Nov 2019 13:03)    ------------------------------------------------------------------------------------  Allergies: Allergies    No Known Allergies    Intolerances    -----------------------------------------------------------------------------------  CURRENT MEDICAL HISTORY:    CURRENT MEDICATIONS:     Neurologic Medications  acetaminophen   Tablet .. 650 milliGRAM(s) Oral every 6 hours  ondansetron Injectable 4 milliGRAM(s) IV Push every 6 hours PRN Nausea    Respiratory Medications    Cardiovascular Medications    Gastrointestinal Medications  senna 2 Tablet(s) Oral at bedtime PRN Constipation    Genitourinary Medications    Hematologic/Oncologic Medications  heparin   Injectable 5000 Unit(s) SubCutaneous every 8 hours    Antimicrobial/Immunologic Medications    Endocrine/Metabolic Medications  glucagon  Injectable 1 milliGRAM(s) IntraMuscular once PRN Glucose LESS THAN 70 milligrams/deciliter  insulin lispro (HumaLOG) corrective regimen sliding scale   SubCutaneous three times a day before meals    Topical/Other Medications    -------------------------------------------------------------------------------------  VITAL SIGNS, INS/OUTS (last 24 hours):    I&O's Summary    --------------------------------------------------------------------------------------  LABS:    Labs:  CAPILLARY BLOOD GLUCOSE    POCT Blood Glucose.: 131 mg/dL (10 Jul 2020 17:28)                          10.0   6.01  )-----------( 181      ( 10 Jul 2020 14:26 )             31.5       Auto Neutrophil %: 73.1 % (07-10-20 @ 14:26)  Auto Immature Granulocyte %: 0.5 % (07-10-20 @ 14:26)    07-10    135  |  94<L>  |  88<HH>  ----------------------------<  95  5.7<H>   |  20  |  4.8<HH>      Calcium, Total Serum: 9.9 mg/dL (07-10-20 @ 14:26)      LFTs:             7.3  | 0.3  | 18       ------------------[107     ( 10 Jul 2020 14:26 )  4.7  | x    | 8           Lipase:x      Amylase:x         Blood Gas Venous - Lactate: 1.0 mmoL/L (07-10-20 @ 14:26)    Coags:     12.90  ----< 1.12    ( 10 Jul 2020 14:26 )     34.8      --------------------------------------------------------------------------------------  CRITICAL CARE DIAGNOSES:   --------------------------------------------------------------------------------------     Acute left rib fx; ribs 6-9 ribs (chau-lateral), left posterior ribs 8-11 SICU Consultation Note  ===========================  GUS WILBURN          69y           Emergency    HPI:    69 year-old female with acute left rib fx; ribs 6-9 ribs (chau-lateral), left posterior ribs 8-11. The patient had a ground level fall yesterday evening while getting dressed after a shower. She hit the left side of her chest on the edge of the bathtub. Developed sharped left pain in the chest, worsening with deep inspiration. The patient went to [facility] urgent care in which plain film imaging was performed. Patient recently started on hemodialysis (scheduled MWF) with Dr. Peguero, patient instructed to skip her Friday hemodialysis appointment and come to the ED for further evaluation.  IS on admission is 500 mL.     PMH notable for ESRD on hemodialysis MWF, chronic pleural effusions on the left for which she get thoracentesis periodically, bladder prolapse requiring chronic Castellon placed 6 weeks ago, Diabetes Type 2 on insulin. Patient sees Dr. Nadeen Rodriguez of Female [facility] med/OB GYN.    PSH notable for CAD s/p CABG x vessels (November 2019), CHF (EF 20-25% with severely decreased LV function, moderate to severe mitral regurg, on 5/17/2020), ESRD on hemodialysis MWF with tesio catheter placement (placed by IR).  chronic pleural effusions on the left for which she get thoracentesis periodically,     Allergies: NKDA    Social History: Former smoker (quit ~15 years ago). Lives with .    RELEVANT IMAGING:     Acute mildly displaced left anterolateral 6th-9th and nondisplaced left posterior 8th-11th rib fractures; no pneumothorax.     2. Cardiomegaly with trace right and small left pleural effusions and bilateral interlobular septal thickening, suggestive of interstitial edema.    ROS:    REVIEW OF SYSTEMS    [x] A ten-point review of systems was otherwise negative except as noted.  [ ] Due to altered mental status/intubation, subjective information were not able to be obtained from the patient. History was obtained, to the extent possible, from review of the chart and collateral sources of information.    ------------------------------------------------------------------------------------    PHYSICAL EXAM:    GENERAL: thin, frail. Cooperative. No acute distress    HEENT:   Head atraumatic, normocephalic  Eyes EOMI, PERRL, conjunctiva and sclera clear  Moist mucous membranes  Neck supple without adenopathy    NERVOUS SYSTEM:  A&Ox3, no focal deficits. Motor function is normal with muscle strength 4/5 L hip flexor, 5/5 R hip flexors 5/5     CARDIAC: Normotensive, nontachycardic.     LUNGS: Unlabored respirations.  C    ABDOMEN: No masses or discoloration. Soft, nontender, nondistended, +BS    GENITOURINARY: Castellon is in place; urine color, urine output     EXTREMITIES: 2+ peripheral pulses bilaterally UE/LE. No clubbing, cyanosis, or edema    SKIN: No rashes or lesions    Tubes/Lines/Drains  ***  [x] Peripheral IV  [] Central Venous Line     	[] R	[] L	[] IJ	[] Fem	[] SC        Type:	    Date Placed:   [] Arterial Line		[] R	[] L	[] Fem	[] Rad	[] Ax	Date Placed:   [] PICC:         	[] Midline		[] Mediport   [] Urinary Catheter		Date Placed:   [] NGT  [] ALEE Drain   ------------------------------------------------------------------------------------  ADVANCE DIRECTIVES: Presumed full code  ------------------------------------------------------------------------------------  MEDICAL HISTORY:    PAST MEDICAL & SURGICAL HISTORY:  Pleural effusion due to congestive heart failure  Congestive heart failure (CHF)  Female bladder prolapse  Heart failure  Diabetes  History of thoracentesis  S/P CABG (coronary artery bypass graft)  History of repair of hip fracture  L hip intramedullary nail 2019    ------------------------------------------------------------------------------------  Home Meds: Home Medications:  ascorbic acid 500 mg oral tablet: 1 tab(s) orally once a day (19 Nov 2019 13:03)  aspirin 81 mg oral tablet, chewable: 1 tab(s) orally once a day (19 Nov 2019 13:03)  atorvastatin 40 mg oral tablet: 1 tab(s) orally once a day (at bedtime) (19 Nov 2019 13:03)  lactobacillus acidophilus oral capsule: 1 tab(s) orally 3 times a day (19 Nov 2019 13:03)  sertraline 25 mg oral tablet: 1 tab(s) orally once a day (19 Nov 2019 13:03)    ------------------------------------------------------------------------------------  Allergies: Allergies    No Known Allergies    Intolerances    -----------------------------------------------------------------------------------  CURRENT MEDICAL HISTORY:    CURRENT MEDICATIONS:     Neurologic Medications  acetaminophen   Tablet .. 650 milliGRAM(s) Oral every 6 hours  ondansetron Injectable 4 milliGRAM(s) IV Push every 6 hours PRN Nausea    Respiratory Medications    Cardiovascular Medications    Gastrointestinal Medications  senna 2 Tablet(s) Oral at bedtime PRN Constipation    Genitourinary Medications    Hematologic/Oncologic Medications  heparin   Injectable 5000 Unit(s) SubCutaneous every 8 hours    Antimicrobial/Immunologic Medications    Endocrine/Metabolic Medications  glucagon  Injectable 1 milliGRAM(s) IntraMuscular once PRN Glucose LESS THAN 70 milligrams/deciliter  insulin lispro (HumaLOG) corrective regimen sliding scale   SubCutaneous three times a day before meals    Topical/Other Medications    -------------------------------------------------------------------------------------  VITAL SIGNS, INS/OUTS (last 24 hours):    I&O's Summary    --------------------------------------------------------------------------------------  LABS:    Labs:  CAPILLARY BLOOD GLUCOSE    POCT Blood Glucose.: 131 mg/dL (10 Jul 2020 17:28)                          10.0   6.01  )-----------( 181      ( 10 Jul 2020 14:26 )             31.5       Auto Neutrophil %: 73.1 % (07-10-20 @ 14:26)  Auto Immature Granulocyte %: 0.5 % (07-10-20 @ 14:26)    07-10    135  |  94<L>  |  88<HH>  ----------------------------<  95  5.7<H>   |  20  |  4.8<HH>      Calcium, Total Serum: 9.9 mg/dL (07-10-20 @ 14:26)      LFTs:             7.3  | 0.3  | 18       ------------------[107     ( 10 Jul 2020 14:26 )  4.7  | x    | 8           Lipase:x      Amylase:x         Blood Gas Venous - Lactate: 1.0 mmoL/L (07-10-20 @ 14:26)    Coags:     12.90  ----< 1.12    ( 10 Jul 2020 14:26 )     34.8      --------------------------------------------------------------------------------------  CRITICAL CARE DIAGNOSES:   --------------------------------------------------------------------------------------     Acute left rib fx; ribs 6-9 ribs (chau-lateral), left posterior ribs 8-11

## 2020-07-10 NOTE — ED PROVIDER NOTE - ATTENDING CONTRIBUTION TO CARE
X ray report from [facility] [place]: Fractures of the left sixth through ninth rbs with displacement. Left lower lung infiltrate and effusion. Cardiomegaly. Hyperinflation. There is no pneumothorax. 70yo F with PMHx CAD/CABG, CHF (EF 40%), ESRD/HD (MWF, Dr Peguero), bladder prolapse, pleural effusion with h/o left thoracentesis, sent from [facility] [place] for eval. Xray report from [facility] [place]: "Fractures of the left sixth through ninth ribs with displacement. Left lower lung infiltrate and effusion. Cardiomegaly. Hyperinflation. There is no pneumothorax." I reviewed the CXR images from [facility] [place] and agree with read.     Pt states she had mechanical fall last night. Pt was putting on her pajama pants after shower, lost balance and fell, left ribs/side landing over edge of bathtub. Denies head trauma, no LOC, not on AC. Ambulating after fall. Pt c/o pain on left side, worse with movement. Denies neck pain, back pain, abd pain, extremity pain. Patient states took tylenol at home for pain and states pain is well controlled. Pt missed HD today because went to [facility] [place]/ED, pt states she spoke with Dr. Peguero who told her it was ok and to resume on Monday. Denies fever/chills, headache, lightheadedness/dizziness, CP, SOB, cough, nausea, vomiting, diarrhea, abd pain.     Vital Signs: I have reviewed the initial vital signs.  Constitutional: WDWN in nad.  HEAD: No signs of basilar skull fracture.  Integumentary: No lacerations, abrasions, hematoma, ecchymoses.  EYES: No periorbial swelling/ecchymoses. PERRL, EOM intact.   ENT: MMM. No rhinorrhea/otorrhea. No septal hematoma. No mastoid ecchymoses.  NECK: Supple, non-tender, no spinous tenderness to neck. No palpable shelves or step-offs.  BACK: No spinous tenderness. No palpable shelves or step-offs.  Cardiovascular: RRR, radial pulses 2/4 b/l. TTP lateral left chest wall.  Respiratory: BS present b/l, ctabl, no wheezing or crackles, good air exchange, good resp effort and excursion, no accessory muscle use, no stridor.   Gastrointestinal: Soft, nd, nt no rebound tenderness or guarding  Musculoskeletal: FROM, no hip pain to palpation. No short leg. No internal or external rotation of LE.  Neurologic: AAOx3. GCS 15. Speech clear and coherent. Answering questions appropriately. Face symmetric, no facial droop. Moving all extremities. No gross FND.

## 2020-07-10 NOTE — ED PROVIDER NOTE - CLINICAL SUMMARY MEDICAL DECISION MAKING FREE TEXT BOX
pt aware of all labs and imaging, understands rib fractures, seen by trauma, report admission to their service, pt aware and agrees, gcs 15.

## 2020-07-10 NOTE — H&P ADULT - NSHPLABSRESULTS_GEN_ALL_CORE
< from: CT Chest No Cont (07.10.20 @ 17:05) >    EXAM:  CT ABDOMEN AND PELVIS          IMPRESSION:   1.  Acute mildly displaced left anterolateral 6th-9th and nondisplaced left posterior 8th-11th rib fractures; no pneumothorax.    2.  Cardiomegaly with trace right and small left pleural effusions and bilateral interlobular septal thickening, suggestive of interstitial edema.    3.  No CT evidence of an acute traumatic intra-abdominal pathology on this limited noncontrast enhanced examination.    4.  Since May 25, 2020, resolved right perinephric hematoma.    < end of copied text >                          10.0   6.01  )-----------( 181      ( 10 Jul 2020 14:26 )             31.5   07-10    135  |  94<L>  |  88<HH>  ----------------------------<  95  5.7<H>   |  20  |  4.8<HH>    Ca    9.9      10 Jul 2020 14:26    TPro  7.3  /  Alb  4.7  /  TBili  0.3  /  DBili  x   /  AST  18  /  ALT  8   /  AlkPhos  107  07-10

## 2020-07-11 NOTE — CHART NOTE - NSCHARTNOTEFT_GEN_A_CORE
SICU Transfer Note:    Transfer from: SICU  Transfer to:  ( x ) Surgery    (  ) Telemetry    (  ) Medicine    (  ) Palliative    (  ) Stroke Unit    (  ) _______________      SICU COURSE:  69y Female s/p mechanical ground level fall 1 day PTA with  Acute left rib fx; ribs 6-9 ribs (chau-lateral), left posterior ribs 8-11, admission  mL    7/11:   - had session of HD     7/10  - admitted for pain control for left sided rib fx  - nephrology consulted for K+ 5.7 (has tesio cath, does dialysis MWF; missed Friday appointment)    PAST MEDICAL & SURGICAL HISTORY:  Pleural effusion due to congestive heart failure  Congestive heart failure (CHF)  Female bladder prolapse  Heart failure  Diabetes  History of thoracentesis  S/P CABG (coronary artery bypass graft)  History of repair of hip fracture    Allergies    No Known Allergies    Intolerances      MEDICATIONS  (STANDING):  acetaminophen   Tablet .. 650 milliGRAM(s) Oral every 6 hours  aMIOdarone    Tablet 200 milliGRAM(s) Oral daily  aspirin  chewable 81 milliGRAM(s) Oral daily  atorvastatin 40 milliGRAM(s) Oral at bedtime  chlorhexidine 4% Liquid 1 Application(s) Topical <User Schedule>  heparin   Injectable 5000 Unit(s) SubCutaneous every 8 hours  hydrALAZINE 25 milliGRAM(s) Oral three times a day  insulin glargine SubCutaneous Injection (LANTUS) - Peds 5 Unit(s) SubCutaneous at bedtime  insulin lispro (HumaLOG) corrective regimen sliding scale   SubCutaneous three times a day before meals  isosorbide   dinitrate Tablet (ISORDIL) 10 milliGRAM(s) Oral three times a day  metoprolol succinate ER 25 milliGRAM(s) Oral daily  pantoprazole    Tablet 40 milliGRAM(s) Oral before breakfast  sertraline 25 milliGRAM(s) Oral daily  tiotropium 18 MICROgram(s) Capsule 1 Capsule(s) Inhalation Once    MEDICATIONS  (PRN):  lidocaine   Patch 1 Patch Transdermal every 24 hours PRN rib fx  ondansetron Injectable 4 milliGRAM(s) IV Push every 6 hours PRN Nausea  senna 2 Tablet(s) Oral at bedtime PRN Constipation        Vital Signs Last 24 Hrs  T(C): 36.2 (11 Jul 2020 12:00), Max: 37 (11 Jul 2020 00:30)  T(F): 97.2 (11 Jul 2020 12:00), Max: 98.6 (11 Jul 2020 00:30)  HR: 65 (11 Jul 2020 12:50) (58 - 73)  BP: 130/60 (11 Jul 2020 12:50) (104/45 - 156/69)  BP(mean): 103 (11 Jul 2020 12:00) (68 - 110)  RR: 16 (11 Jul 2020 12:50) (11 - 24)  SpO2: 97% (11 Jul 2020 12:50) (95% - 100%)  I&O's Summary    10 Jul 2020 07:01  -  11 Jul 2020 07:00  --------------------------------------------------------  IN: 60 mL / OUT: 90 mL / NET: -30 mL    11 Jul 2020 07:01  -  11 Jul 2020 15:09  --------------------------------------------------------  IN: 200 mL / OUT: 60 mL / NET: 140 mL        LABS                                            8.9                   Neurophils% (auto):   62.9   (07-11 @ 05:11):    4.56 )-----------(148          Lymphocytes% (auto):  20.2                                          28.4                   Eosinphils% (auto):   2.9      Manual%: Neutrophils x    ; Lymphocytes x    ; Eosinophils x    ; Bands%: x    ; Blasts x                                    136    |  96     |  105                 Calcium: 9.6   / iCa: x      (07-11 @ 05:11)    ----------------------------<  97        Magnesium: 2.3                              5.8     |  21     |  5.4              Phosphorous: 6.4              ASSESSMENT/PLAN: 69yFemale      CNS  - no LOC  - restart melatonin, setraline  - Pain control: Tylenol, lidoderm patch if needed    Pulm  - multiple L sided rib fx 6-11  - IS @ 800  - AM CXR  - hx of Recurrent thoracentesis for recurrent L pleural effusions    CV  - hx of congestive heart failure with EF 20-25% with severely decreased LV function, moderate to severe mitral regurg, on 5/17/2020).   - hx of CAD s/p CABG (11/2019)  - on statin, hydralazine, isosorbide dinitrate, metoprolol 25 ER, amiodarone, holding torsemide  - CE .2 ____    GI  - Diet: CC/Renal  - bowel regimen, PPI      - chronic indwelling catheter for bladder prolapse.   - UO 5-15 cc/hr  - Elytes: Na 136 / K+ 5.8 / Mg 2.3 / Ph 6.4 from 5.8  - BUN/Cr 104/5.4 (baseline 3.7 in May)  - Hx of ESRD on Dialysis MWF (thru R [facility]io) - sees Dr. Liza Peguero - last HD on Wed 7/8 (nephrologist recommended skipping HD yesterday and coming to the hospital)   - nephro c/s placed  - hx of chronic uterine prolapse. Sees Dr. Nadeen Rodriguez from OB/GYN    Heme  -Hgb 10 (7.2 in May) > 9.3 > 8.9  -Hep SQ for prophylaxis, ASA 81mg    ID  - afebrile  - WBC 5 > 4.5  - no abx    MSK  - PT eval    Endo  - Hx of DM - on home lantus 5 units at bedtime, ISS  - q ac & hs fingersticks        DVT Prophylaxis: aspirin  chewable 81 milliGRAM(s) Oral daily  heparin   Injectable 5000 Unit(s) SubCutaneous every 8 hours      GI Prophylaxis:pantoprazole    Tablet 40 milliGRAM(s) Oral before breakfast  senna 2 Tablet(s) Oral at bedtime PRN        For Follow-Up:

## 2020-07-11 NOTE — PROGRESS NOTE ADULT - SUBJECTIVE AND OBJECTIVE BOX
GUS WILBURN  [id number]  69y Female    Indication for ICU admission: Acute left rib fx; ribs 6-9 ribs (chau-lateral), left posterior ribs 8-11  Admit Date:07-10-20  ICU Date: 7/10  OR Date:    No Known Allergies    PAST MEDICAL & SURGICAL HISTORY:  Pleural effusion due to congestive heart failure  Congestive heart failure (CHF)  Female bladder prolapse  Heart failure  Diabetes  History of thoracentesis  S/P CABG (coronary artery bypass graft)  History of repair of hip fracture    Home Medications:  ascorbic acid 500 mg oral tablet: 1 tab(s) orally once a day (19 Nov 2019 13:03)  aspirin 81 mg oral tablet, chewable: 1 tab(s) orally once a day (19 Nov 2019 13:03)  atorvastatin 40 mg oral tablet: 1 tab(s) orally once a day (at bedtime) (19 Nov 2019 13:03)  lactobacillus acidophilus oral capsule: 1 tab(s) orally 3 times a day (19 Nov 2019 13:03)  sertraline 25 mg oral tablet: 1 tab(s) orally once a day (19 Nov 2019 13:03)        24HRS EVENT:    Overnight: No acute events. Potassium elevated (slightly downtrended) 5.5, BUN/Cr uptrending. Nephro c/s pending.      CNS  - no LOC  - restart melatonin, setraline  - Pain control: Tylenol, lidoderm patch if needed    Pulm  - multiple L sided rib fx 6-11  - IS @ 800  - AM CXR  - hx of Recurrent thoracentesis for recurrent L pleural effusions    CV  - hx of congestive heart failure with EF 20-25% with severely decreased LV function, moderate to severe mitral regurg, on 5/17/2020).   - hx of CAD s/p CABG (11/2019)  - on statin, hydralazine, isosorbide dinitrate, metoprolol 25 ER, amiodarone, holding torsemide    GI  - Diet: CC/Renal  - bowel regimen, PPI      - chronic indwelling catheter for bladder prolapse.   - UO 30cc/hr  - Elytes: Na 133 / K+ 5.5 / Mg 2.3 / Ph 5.8  - BUN/Cr 104/5.4 (baseline 3.7 in May)  - Hx of ESRD on Dialysis MWF (thru [facility]) - sees Dr. Liza Peguero - last HD on Wed 7/8 (nephrologist recommended skipping HD yesterday and coming to the hospital)   - nephro c/s placed  - hx of chronic uterine prolapse. Sees Dr. Nadeen Rodriguez from OB/GYN    Heme  -Hgb 10 (7.2 in May) > 9.3  -Hep SQ for prophylaxis, ASA 81mg    ID  - afebrile  - WBC 5  - no abx    MSK  - PT eval    Endo  - Hx of DM - on home lantus 5 units at bedtime, ISS  - q ac & hs fingersticks            DVT Prophylaxis: aspirin  chewable 81 milliGRAM(s) Oral daily  heparin   Injectable 5000 Unit(s) SubCutaneous every 8 hours      GI Prophylaxis:pantoprazole    Tablet 40 milliGRAM(s) Oral before breakfast  senna 2 Tablet(s) Oral at bedtime PRN      ***Tubes/Lines/Drains  ***  Peripheral IV	                  R Tesio                         Urinary Catheter		Indication: Strict I&O          [X] A ten-point review of systems was otherwise negative except as noted above.  [  ] Due to altered mental status/intubation, subjective information was not attained from the patient. History was obtained, to the extent possible, from review of the chart and collateral sources of information. GUS WILBURN  [id number]  69y Female    Indication for ICU admission: Acute left rib fx; ribs 6-9 ribs (chau-lateral), left posterior ribs 8-11  Admit Date:07-10-20  ICU Date: 7/10  OR Date:    No Known Allergies    PAST MEDICAL & SURGICAL HISTORY:  Pleural effusion due to congestive heart failure  Congestive heart failure (CHF)  Female bladder prolapse  Heart failure  Diabetes  History of thoracentesis  S/P CABG (coronary artery bypass graft)  History of repair of hip fracture    Home Medications:  ascorbic acid 500 mg oral tablet: 1 tab(s) orally once a day (19 Nov 2019 13:03)  aspirin 81 mg oral tablet, chewable: 1 tab(s) orally once a day (19 Nov 2019 13:03)  atorvastatin 40 mg oral tablet: 1 tab(s) orally once a day (at bedtime) (19 Nov 2019 13:03)  lactobacillus acidophilus oral capsule: 1 tab(s) orally 3 times a day (19 Nov 2019 13:03)  sertraline 25 mg oral tablet: 1 tab(s) orally once a day (19 Nov 2019 13:03)        24HRS EVENT:    Overnight: No acute events. Potassium elevated (slightly downtrended) 5.5, BUN/Cr uptrending. Nephro c/s pending.      CNS  - no LOC  - restart melatonin, setraline  - Pain control: Tylenol, lidoderm patch if needed    Pulm  - multiple L sided rib fx 6-11  - IS @ 800  - AM CXR  - hx of Recurrent thoracentesis for recurrent L pleural effusions    CV  - hx of congestive heart failure with EF 20-25% with severely decreased LV function, moderate to severe mitral regurg, on 5/17/2020).   - hx of CAD s/p CABG (11/2019)  - on statin, hydralazine, isosorbide dinitrate, metoprolol 25 ER, amiodarone, holding torsemide    GI  - Diet: CC/Renal  - bowel regimen, PPI      - chronic indwelling catheter for bladder prolapse.   - UO 30cc/hr  - Elytes: Na 133 / K+ 5.5 / Mg 2.3 / Ph 5.8  - BUN/Cr 104/5.4 (baseline 3.7 in May)  - Hx of ESRD on Dialysis MWF (thru R [facility]) - sees Dr. Liza Peguero - last HD on Wed 7/8 (nephrologist recommended skipping HD yesterday and coming to the hospital)   - nephro c/s placed  - hx of chronic uterine prolapse. Sees Dr. Nadeen Rodriguez from OB/GYN    Heme  -Hgb 10 (7.2 in May) > 9.3  -Hep SQ for prophylaxis, ASA 81mg    ID  - afebrile  - WBC 5  - no abx    MSK  - PT eval    Endo  - Hx of DM - on home lantus 5 units at bedtime, ISS  - q ac & hs fingersticks            DVT Prophylaxis: aspirin  chewable 81 milliGRAM(s) Oral daily  heparin   Injectable 5000 Unit(s) SubCutaneous every 8 hours      GI Prophylaxis:pantoprazole    Tablet 40 milliGRAM(s) Oral before breakfast  senna 2 Tablet(s) Oral at bedtime PRN      ***Tubes/Lines/Drains  ***  Peripheral IV	                  R Tesio                         Urinary Catheter		Indication: Strict I&O          [X] A ten-point review of systems was otherwise negative except as noted above.  [  ] Due to altered mental status/intubation, subjective information was not attained from the patient. History was obtained, to the extent possible, from review of the chart and collateral sources of information.        HD: 1d  Daily Height in cm: 157.48 (11 Jul 2020 00:30)    Daily     Diet, Consistent Carbohydrate Renal/No Snacks (07-10-20 @ 20:29)      CURRENT MEDS:  Neurologic Medications  acetaminophen   Tablet .. 650 milliGRAM(s) Oral every 6 hours  ondansetron Injectable 4 milliGRAM(s) IV Push every 6 hours PRN Nausea  sertraline 25 milliGRAM(s) Oral daily    Respiratory Medications  tiotropium 18 MICROgram(s) Capsule 1 Capsule(s) Inhalation Once    Cardiovascular Medications  aMIOdarone    Tablet 200 milliGRAM(s) Oral daily  hydrALAZINE 25 milliGRAM(s) Oral three times a day  isosorbide   dinitrate Tablet (ISORDIL) 10 milliGRAM(s) Oral three times a day  metoprolol succinate ER 25 milliGRAM(s) Oral daily    Gastrointestinal Medications  pantoprazole    Tablet 40 milliGRAM(s) Oral before breakfast  senna 2 Tablet(s) Oral at bedtime PRN Constipation    Genitourinary Medications    Hematologic/Oncologic Medications  aspirin  chewable 81 milliGRAM(s) Oral daily  heparin   Injectable 5000 Unit(s) SubCutaneous every 8 hours    Antimicrobial/Immunologic Medications    Endocrine/Metabolic Medications  atorvastatin 40 milliGRAM(s) Oral at bedtime  insulin glargine SubCutaneous Injection (LANTUS) - Peds 5 Unit(s) SubCutaneous at bedtime  insulin lispro (HumaLOG) corrective regimen sliding scale   SubCutaneous three times a day before meals    Topical/Other Medications  chlorhexidine 4% Liquid 1 Application(s) Topical <User Schedule>  lidocaine   Patch 1 Patch Transdermal every 24 hours PRN rib fx      ICU Vital Signs Last 24 Hrs  T(C): 35.8 (11 Jul 2020 08:00), Max: 37 (11 Jul 2020 00:30)  T(F): 96.5 (11 Jul 2020 08:00), Max: 98.6 (11 Jul 2020 00:30)  HR: 65 (11 Jul 2020 12:50) (58 - 73)  BP: 130/60 (11 Jul 2020 12:50) (104/45 - 174/73)  BP(mean): 95 (11 Jul 2020 11:00) (68 - 110)  ABP: --  ABP(mean): --  RR: 16 (11 Jul 2020 12:50) (11 - 24)  SpO2: 97% (11 Jul 2020 12:50) (94% - 100%)      Adult Advanced Hemodynamics Last 24 Hrs  CVP(mm Hg): --  CVP(cm H2O): --  CO: --  CI: --  PA: --  PA(mean): --  PCWP: --  SVR: --  SVRI: --  PVR: --  PVRI: --          I&O's Summary    10 Jul 2020 07:01  -  11 Jul 2020 07:00  --------------------------------------------------------  IN: 60 mL / OUT: 90 mL / NET: -30 mL    11 Jul 2020 07:01  -  11 Jul 2020 13:03  --------------------------------------------------------  IN: 200 mL / OUT: 15 mL / NET: 185 mL      I&O's Detail    10 Jul 2020 07:01  -  11 Jul 2020 07:00  --------------------------------------------------------  IN:    Oral Fluid: 60 mL  Total IN: 60 mL    OUT:    Ureteral Catheter: 90 mL  Total OUT: 90 mL    Total NET: -30 mL      11 Jul 2020 07:01  -  11 Jul 2020 13:03  --------------------------------------------------------  IN:    Oral Fluid: 200 mL  Total IN: 200 mL    OUT:    Ureteral Catheter: 15 mL  Total OUT: 15 mL    Total NET: 185 mL          PHYSICAL EXAM:    General/Neuro  GCS: 15    = E  4 / V 5  / M 6     alert & oriented x 3, no focal deficits  Pupils: 3/3 reactive    Lungs:      clear to auscultation, Normal expansion/effort. Tenderness to palpation left lateral chest wall.     Cardiovascular : S1, S2.  Regular rate and rhythm.  Peripheral edema   Cardiac Rhythm: Normal Sinus Rhythm    GI: Abdomen soft, Non-tender, Non-distended.      Extremities: Extremities warm, pink, well-perfused. 2+ pitting edema of bilateral lower legs.    Derm: Good skin turgor, no skin breakdown.      :       Castellon catheter in place due to bladder prolapse.       CXR:   < from: Xray Chest 1 View AP/PA (07.11.20 @ 05:48) >  Bilateral opacities/pleural effusions. Stable cardiomegaly. Left rib fractures    < end of copied text >      LABS:    Labs:  CAPILLARY BLOOD GLUCOSE      POCT Blood Glucose.: 130 mg/dL (11 Jul 2020 12:59)  POCT Blood Glucose.: 102 mg/dL (11 Jul 2020 06:57)  POCT Blood Glucose.: 103 mg/dL (11 Jul 2020 02:05)  POCT Blood Glucose.: 122 mg/dL (10 Jul 2020 22:19)  POCT Blood Glucose.: 131 mg/dL (10 Jul 2020 17:28)                          8.9    4.56  )-----------( 148      ( 11 Jul 2020 05:11 )             28.4       Auto Neutrophil %: 62.9 % (07-11-20 @ 05:11)  Auto Immature Granulocyte %: 0.4 % (07-11-20 @ 05:11)  Auto Neutrophil %: 65.6 % (07-11-20 @ 00:57)  Auto Immature Granulocyte %: 0.6 % (07-11-20 @ 00:57)  Auto Neutrophil %: 73.1 % (07-10-20 @ 14:26)  Auto Immature Granulocyte %: 0.5 % (07-10-20 @ 14:26)    07-11    136  |  96<L>  |  105<HH>  ----------------------------<  97  5.8<H>   |  21  |  5.4<HH>      Calcium, Total Serum: 9.6 mg/dL (07-11-20 @ 05:11)      LFTs:             7.3  | 0.3  | 18       ------------------[107     ( 10 Jul 2020 14:26 )  4.7  | x    | 8           Lipase:x      Amylase:x         Blood Gas Venous - Lactate: 1.0 mmoL/L (07-10-20 @ 14:26)      Coags:     12.90  ----< 1.12    ( 10 Jul 2020 14:26 )     34.8        CARDIAC MARKERS ( 11 Jul 2020 05:11 )  x     / 0.20 ng/mL / x     / x     / x

## 2020-07-11 NOTE — PROGRESS NOTE ADULT - SUBJECTIVE AND OBJECTIVE BOX
GENERAL SURGERY PROGRESS NOTE     GUS WILBURN  [address] [place] [address] day :1d  POD:  Procedure:   Surgical Attending: Josefina Gutierrez  Overnight events: No acute events overnight. Patient found in NAD. Does no complain of pain, breathing well.    T(F): 98.6 (07-11-20 @ 00:30), Max: 98.6 (07-11-20 @ 00:30)  HR: 70 (07-11-20 @ 01:00) (70 - 73)  BP: 156/69 (07-11-20 @ 01:00) (130/61 - 174/73)  ABP: --  ABP(mean): --  RR: 24 (07-11-20 @ 01:00) (18 - 24)  SpO2: 98% (07-11-20 @ 01:00) (95% - 99%)      07-10-20 @ 07:01  -  07-11-20 @ 03:11  --------------------------------------------------------  IN:  Total IN: 0 mL    OUT:    Ureteral Catheter: 30 mL  Total OUT: 30 mL    Total NET: -30 mL        DIET/FLUIDS:     URINE:    Indwelling Urethral Catheter:     Connect To:  Straight Drainage/Gravity    Indication:  Urine Output Monitoring in Critically Ill (07-10-20 @ 18:44)  Indwelling Urethral Catheter:     Connect To:  Leg Bag    Indication:  Urinary Retention / Obstruction    Additional Instructions:  Hx of bladder prolapse (07-10-20 @ 18:57)  Indwelling Urethral Catheter:     Connect To:  Straight Drainage/[place]    Indication:  Urinary Retention / Obstruction    Additional Instructions:  DO NOT remove for the next 48 hours at which time a new order is required to either remove or keep the indwelling urinary catheter. (07-10-20 @ 19:01)    GI proph:  pantoprazole    Tablet 40 milliGRAM(s) Oral before breakfast    AC/ proph: aspirin  chewable 81 milliGRAM(s) Oral daily  heparin   Injectable 5000 Unit(s) SubCutaneous every 8 hours    ABx:     GEN: NAD, well appearing  HEENT: NC/AT  CHEST: Symmetric chest wall expansion. Regular heart rate. mild L sided chest terderness  ABD: S/D, non-tender,      LABS  Labs:  CAPILLARY BLOOD GLUCOSE      POCT Blood Glucose.: 103 mg/dL (11 Jul 2020 02:05)  POCT Blood Glucose.: 122 mg/dL (10 Jul 2020 22:19)  POCT Blood Glucose.: 131 mg/dL (10 Jul 2020 17:28)                          9.3    5.03  )-----------( 165      ( 11 Jul 2020 00:57 )             29.9       Auto Neutrophil %: 65.6 % (07-11-20 @ 00:57)  Auto Immature Granulocyte %: 0.6 % (07-11-20 @ 00:57)  Auto Neutrophil %: 73.1 % (07-10-20 @ 14:26)  Auto Immature Granulocyte %: 0.5 % (07-10-20 @ 14:26)    07-11    133<L>  |  91<L>  |  104<HH>  ----------------------------<  120<H>  5.5<H>   |  22  |  5.4<HH>      Calcium, Total Serum: 9.7 mg/dL (07-11-20 @ 00:57)      LFTs:             7.3  | 0.3  | 18       ------------------[107     ( 10 Jul 2020 14:26 )  4.7  | x    | 8           Lipase:x      Amylase:x         Blood Gas Venous - Lactate: 1.0 mmoL/L (07-10-20 @ 14:26)      Coags:     12.90  ----< 1.12    ( 10 Jul 2020 14:26 )     34.8          RADIOLOGY & ADDITIONAL TESTS:  < from: CT Chest No Cont (07.10.20 @ 17:05) >  1.  Acute mildly displaced left anterolateral 6th-9th and nondisplaced left posterior 8th-11th rib fractures; no pneumothorax.    2.  Cardiomegaly with trace right and small left pleural effusions and bilateral interlobular septal thickening, suggestive of interstitial edema.    3.  No CT evidence of an acute traumatic intra-abdominal pathology on this limited noncontrast enhanced examination.    4.  Since May 25, 2020, resolved right perinephric hematoma.    < end of copied text >

## 2020-07-11 NOTE — PROGRESS NOTE ADULT - SUBJECTIVE AND OBJECTIVE BOX
Patient was examined during HD treatment.    Hemodialysis Prescription:  	Access: TDC  	Dialyzer: Opti 160  	Blood Flow (mL/Min): 400  	Dialysate Flow (mL/Min): 500  	Target UF (Liters): 3  	Treatment Time: 3 hours  	Potassium: 2K  	Calcium: 2.5

## 2020-07-11 NOTE — CONSULT NOTE ADULT - SUBJECTIVE AND OBJECTIVE BOX
[facility] INITIAL CONSULT NOTE  --------------------------------------------------------------------------------  HPI:  Patient is a 69 year-old female with PMHx of CAD s/p CABG (November 2019), CHF (EF 40%), acute renal failure on hemodialysis MWF at [facility] via IJ tunneled catheter, chronic pleural effusions on the left for which she get thoracentesis periodically, bladder prolapse requiring chronic Castellon placed 6 weeks ago, Diabetes Type 2 on insulin. She presents after mechanical ground level fall Friday, where she tripped while trying to put on her pants and fell hitting her left side on the edge of the bathtub. She denies any LOC or other injuries. Patient reported to an urgent care where CXR showed displaced left-sided rib fractures. She was transferred to [facility] where CT scan shows mildly displaced left anterolateral 6th-9th and nondisplaced left posterior 8th-11th rib fractures.     PAST HISTORY  --------------------------------------------------------------------------------  PAST MEDICAL & SURGICAL HISTORY:  Pleural effusion due to congestive heart failure  Congestive heart failure (CHF)  Female bladder prolapse  Heart failure  Diabetes  History of thoracentesis  S/P CABG (coronary artery bypass graft)  History of repair of hip fracture    FAMILY HISTORY:  FH: myocardial infarction (Mother)  FH: stomach cancer (Mother)    SOCIAL HISTORY:  No current ETOH, tobacco, or illicit drug use  ALLERGIES & MEDICATIONS  --------------------------------------------------------------------------------  Allergies    No Known Allergies    Standing Inpatient Medications  acetaminophen   Tablet .. 650 milliGRAM(s) Oral every 6 hours  aMIOdarone    Tablet 200 milliGRAM(s) Oral daily  aspirin  chewable 81 milliGRAM(s) Oral daily  atorvastatin 40 milliGRAM(s) Oral at bedtime  chlorhexidine 4% Liquid 1 Application(s) Topical <User Schedule>  heparin   Injectable 5000 Unit(s) SubCutaneous every 8 hours  hydrALAZINE 25 milliGRAM(s) Oral three times a day  insulin glargine SubCutaneous Injection (LANTUS) - Peds 5 Unit(s) SubCutaneous at bedtime  insulin lispro (HumaLOG) corrective regimen sliding scale   SubCutaneous three times a day before meals  isosorbide   dinitrate Tablet (ISORDIL) 10 milliGRAM(s) Oral three times a day  metoprolol succinate ER 25 milliGRAM(s) Oral daily  pantoprazole    Tablet 40 milliGRAM(s) Oral before breakfast  sertraline 25 milliGRAM(s) Oral daily  tiotropium 18 MICROgram(s) Capsule 1 Capsule(s) Inhalation Once    PRN Inpatient Medications  lidocaine   Patch 1 Patch Transdermal every 24 hours PRN  ondansetron Injectable 4 milliGRAM(s) IV Push every 6 hours PRN  senna 2 Tablet(s) Oral at bedtime PRN    REVIEW OF SYSTEMS  --------------------------------------------------------------------------------  Gen: No fevers/chills  Skin: No rashes  Head/Eyes/Ears/Mouth: No headache; No sinus pain/discomfort, sore throat  Respiratory: No dyspnea, cough, wheezing, hemoptysis  CV: No chest pain, PND, orthopnea  GI: No abdominal pain, diarrhea, constipation, nausea, vomiting, melena, hematochezia  : No increased frequency, dysuria, hematuria, nocturia  All other systems were reviewed and are negative, except as noted.    VITALS/PHYSICAL EXAM  --------------------------------------------------------------------------------  T(C): 35.8 (07-11-20 @ 08:00), Max: 37 (07-11-20 @ 00:30)  HR: 65 (07-11-20 @ 12:50) (58 - 73)  BP: 130/60 (07-11-20 @ 12:50) (104/45 - 174/73)  RR: 16 (07-11-20 @ 12:50) (11 - 24)  SpO2: 97% (07-11-20 @ 12:50) (94% - 100%)  Height (cm): 157.5 (07-11-20 @ 00:30)  Weight (kg): 53.5 (07-11-20 @ 00:30)  BMI (kg/m2): 21.6 (07-11-20 @ 00:30)  BSA (m2): 1.53 (07-11-20 @ 00:30)    07-10-20 @ 07:01  -  07-11-20 @ 07:00  --------------------------------------------------------  IN: 60 mL / OUT: 90 mL / NET: -30 mL    07-11-20 @ 07:01  -  07-11-20 @ 13:10  --------------------------------------------------------  IN: 200 mL / OUT: 15 mL / NET: 185 mL      Physical Exam:  	Gen: NAD  	HEENT: Supple neck, clear oropharynx  	Pulm: CTA B/L  	CV: RRR, S1S2  	Back: No CVA tenderness; no sacral edema  	Abd: +BS, soft, nontender/nondistended  	: No suprapubic tenderness  	LE: Warm,  edema  	Neuro: AAO x3  	Vascular access: IJ TD    LABS/STUDIES  --------------------------------------------------------------------------------              8.9    4.56  >-----------<  148      [07-11-20 @ 05:11]              28.4     136  |  96  |  105  ----------------------------<  97      [07-11-20 @ 05:11]  5.8   |  21  |  5.4        Ca     9.6     [07-11-20 @ 05:11]      Mg     2.3     [07-11-20 @ 05:11]      Phos  6.4     [07-11-20 @ 05:11]    TPro  7.3  /  Alb  4.7  /  TBili  0.3  /  DBili  x   /  AST  18  /  ALT  8   /  AlkPhos  107  [07-10-20 @ 14:26]    PT/INR: PT 12.90, INR 1.12       [07-10-20 @ 14:26]  PTT: 34.8       [07-10-20 @ 14:26]    Troponin 0.20      [07-11-20 @ 05:11]    Creatinine Trend:  SCr 5.4 [07-11 @ 05:11]  SCr 5.4 [07-11 @ 00:57]  SCr 4.8 [07-10 @ 14:26]    Urinalysis - [05-11-20 @ 22:30]      Color Yellow / Appearance Turbid / SG 1.005 / pH 7.0      Gluc Negative / Ketone Negative  / Bili Negative / Urobili <2 mg/dL       Blood Moderate / Protein 100 mg/dL / Leuk Est Large / Nitrite Negative      RBC 3 / WBC >720 / Hyaline 8 / Gran  / Sq Epi  / Non Sq Epi 3 / Bacteria Moderate    Iron 55, TIBC 167, %sat 33      [05-27-20 @ 13:22]  Ferritin 636      [05-27-20 @ 13:22]  HbA1c 5.7      [11-09-19 @ 01:20]  TSH 0.58      [08-02-19 @ 18:37]  Lipid: chol 153, , HDL 45, LDL 75      [08-30-19 @ 07:16]    HBsAb Nonreact      [05-19-20 @ 14:58]  HBsAg Nonreact      [05-19-20 @ 14:58]  HCV 0.33, Nonreact      [05-19-20 @ 14:58]    Free Light Chains: kappa 6.76, lambda 3.58, ratio = 1.89      [05-19 @ 04:50]  Immunofixation Serum:   No Monoclonal Band Identified    Reference Range: None Detected      [05-19-20 @ 04:50]  SPEP Interpretation: Hypogammaglobulinemia      [05-19-20 @ 04:50]

## 2020-07-11 NOTE — PROGRESS NOTE ADULT - ASSESSMENT
A/P:  GUS WILBURN is a 69yFemale HD#1 for s/p fall. Patient seen & examined at bedside. In NAD & has no complaints.     Plan:   - OOB  - Incentive spirometry  - f/u vitals  - f/u labs & replete if necessary  - DVT PPX  - GI PPX  - no current surgical intervention needed at this time  - patient was explained plan, understood, and agreed

## 2020-07-11 NOTE — PROGRESS NOTE ADULT - ASSESSMENT
69 year-old female with acute left rib fx; ribs 6-9 ribs (chau-lateral), left posterior ribs 8-11    CNS  - no LOC  - restart melatonin, setraline  - lidoderm patch for pain    Pulm  - multiple L sided rib fx  - incentive spirometry/pulmonary toilet    CV  - hx of congestive heart failure with EF 20-25% with severely decreased LV function, moderate to severe mitral regurg, on 5/17/2020). Recurrent thoracentesis for recurrent L pleural effusions  - on statin, hydralazine 25, nitrate, torsemide, metoprolol 50 ER    GI  - bowel regimen, restart PPI  - consistent carb diet      - chronic indwelling catheter for bladder prolapse.   - hx of chronic uterine prolapse - See Dr. Nadeen Rodriguez from OB/GYN  - K+ 5.7  - Dialysis MWF - sees Dr. Liza Peguero    Heme  -Hgb 10 (7.2 in May)  -Hep SQ for prophylaxis    ID  - afebrile, WBC 6    MSK  - PT eval    Endo  - restart home lantus 5 units at bedtime  - qhs fingersticks

## 2020-07-12 NOTE — PROGRESS NOTE ADULT - SUBJECTIVE AND OBJECTIVE BOX
24HRS EVENT:  HD on 7/11, Post dialysis labs (Na 141, K 4.3, Mg 2.3, phos 4.1)  Pain controlled    CNS  - no LOC  - on home psych meds, melatonin, setraline  - Pain controlled with Tylenol and lidoderm patch     Pulm  - multiple L sided rib fx 6-11  - IS @ 800  - AM CXR  - hx of Recurrent thoracentesis for recurrent L pleural effusion    CV  - hx of congestive heart failure with EF 20-25% with severely decreased LV function, moderate to severe mitral regurg, on 5/17/2020).   - hx of CAD s/p CABG (11/2019)  - on statin, hydralazine, isosorbide dinitrate, metoprolol 25 ER, amiodarone, holding torsemide  - CE .2, .2, .21     GI  - Diet: CC/Renal  - bowel regimen, PPI      - chronic indwelling catheter for bladder prolapse.   - UO 5-15 cc/hr  - BUN/Cr 104/5.4 -> 60/3.8 (baseline 3.7 in May)  - Hx of ESRD on Dialysis MWF (thru [facility]) - sees Dr. Liza Peguero - had HD on Wed 7/8  - Dialysis 7/11, 1.5 L removed  - Post dialysis labs (Na 141, K 4.3, Mg 2.3, phos 4.1)  - hx of chronic uterine prolapse. Sees Dr. Nadeen Rodriguez from OB/GYN    Heme  -Hgb 10 (7.2 in May) > 9.3 > 8.9 > 8.9  -Hep SQ for prophylaxis, ASA 81mg    ID  - afebrile  - WBC 5 > 4.5 > 4.7  - no abx    MSK  - PT eval    Endo  - Hx of DM - on home lantus 5 units at bedtime, ISS  - q ac & hs fingersticks    DVT Prophylaxis: aspirin  chewable 81 milliGRAM(s) Oral daily  heparin   Injectable 5000 Unit(s) SubCutaneous every 8 hours      GI Prophylaxis:pantoprazole    Tablet 40 milliGRAM(s) Oral before breakfast  senna 2 Tablet(s) Oral at bedtime PRN      ***Tubes/Lines/Drains  ***  Peripheral IV	                  [facility]                         Urinary Catheter		Indication: urinary retension      Daily     Diet, Consistent Carbohydrate Renal/No Snacks (07-10-20 @ 20:29)      CURRENT MEDS:  Neurologic Medications  acetaminophen   Tablet .. 650 milliGRAM(s) Oral every 6 hours  ondansetron Injectable 4 milliGRAM(s) IV Push every 6 hours PRN Nausea  sertraline 25 milliGRAM(s) Oral daily    Respiratory Medications    Cardiovascular Medications  aMIOdarone    Tablet 200 milliGRAM(s) Oral daily  hydrALAZINE 25 milliGRAM(s) Oral three times a day  isosorbide   dinitrate Tablet (ISORDIL) 10 milliGRAM(s) Oral three times a day  metoprolol succinate ER 25 milliGRAM(s) Oral daily    Gastrointestinal Medications  pantoprazole    Tablet 40 milliGRAM(s) Oral before breakfast  senna 2 Tablet(s) Oral at bedtime PRN Constipation    Genitourinary Medications    Hematologic/Oncologic Medications  aspirin  chewable 81 milliGRAM(s) Oral daily  heparin   Injectable 5000 Unit(s) SubCutaneous every 8 hours    Antimicrobial/Immunologic Medications    Endocrine/Metabolic Medications  atorvastatin 40 milliGRAM(s) Oral at bedtime  insulin glargine SubCutaneous Injection (LANTUS) - Peds 5 Unit(s) SubCutaneous at bedtime  insulin lispro (HumaLOG) corrective regimen sliding scale   SubCutaneous three times a day before meals    Topical/Other Medications  chlorhexidine 4% Liquid 1 Application(s) Topical <User Schedule>  lidocaine   Patch 1 Patch Transdermal every 24 hours PRN rib fx  mupirocin 2% Nasal 1 Application(s) Nasal two times a day      ICU Vital Signs Last 24 Hrs  T(C): 36.1 (12 Jul 2020 00:00), Max: 36.6 (11 Jul 2020 04:00)  T(F): 97 (12 Jul 2020 00:00), Max: 97.8 (11 Jul 2020 04:00)  HR: 76 (12 Jul 2020 00:00) (58 - 80)  BP: 112/56 (12 Jul 2020 00:00) (104/45 - 148/55)  BP(mean): 83 (12 Jul 2020 00:00) (68 - 110)  ABP: --  ABP(mean): --  RR: 16 (12 Jul 2020 00:00) (0 - 24)  SpO2: 94% (12 Jul 2020 00:00) (93% - 100%)      Adult Advanced Hemodynamics Last 24 Hrs  CVP(mm Hg): --  CVP(cm H2O): --  CO: --  CI: --  PA: --  PA(mean): --  PCWP: --  SVR: --  SVRI: --  PVR: --  PVRI: --          I&O's Summary    10 Jul 2020 07:01  -  11 Jul 2020 07:00  --------------------------------------------------------  IN: 60 mL / OUT: 90 mL / NET: -30 mL    11 Jul 2020 07:01 - 12 Jul 2020 01:05  --------------------------------------------------------  IN: 200 mL / OUT: 1650 mL / NET: -1450 mL      I&O's Detail    10 Jul 2020 07:01  -  11 Jul 2020 07:00  --------------------------------------------------------  IN:    Oral Fluid: 60 mL  Total IN: 60 mL    OUT:    Ureteral Catheter: 90 mL  Total OUT: 90 mL    Total NET: -30 mL      11 Jul 2020 07:01 - 12 Jul 2020 01:05  --------------------------------------------------------  IN:    Oral Fluid: 200 mL  Total IN: 200 mL    OUT:    Other: 1500 mL    Ureteral Catheter: 150 mL  Total OUT: 1650 mL    Total NET: -1450 mL          PHYSICAL EXAM:    General/Neuro  GCS:   15  = E 4/ V 5  / M    6  Deficits:                   no focal deficits  Pupils: 3/3 reactive    Lungs:      clear to auscultation, Normal expansion/effort. tenderness to palpation of left chest wall     Cardiovascular : S1, S2.  Regular rate and rhythm.  Peripheral edema   Cardiac Rhythm: Normal Sinus Rhythm    GI: Abdomen soft, Non-tender, Non-distended.    Wound: dressings clean/dry/intact    Extremities: Extremities warm, well perfused    Derm: Good skin turgor, no skin breakdown.      :       Castellon catheter in place.     LABS:    Labs:  CAPILLARY BLOOD GLUCOSE      POCT Blood Glucose.: 106 mg/dL (11 Jul 2020 21:47)  POCT Blood Glucose.: 181 mg/dL (11 Jul 2020 16:29)  POCT Blood Glucose.: 130 mg/dL (11 Jul 2020 12:59)  POCT Blood Glucose.: 102 mg/dL (11 Jul 2020 06:57)  POCT Blood Glucose.: 103 mg/dL (11 Jul 2020 02:05)                          8.9    4.68  )-----------( 147      ( 12 Jul 2020 00:27 )             28.7       Auto Neutrophil %: 62.9 % (07-11-20 @ 05:11)  Auto Immature Granulocyte %: 0.4 % (07-11-20 @ 05:11)    07-11    141  |  102  |  60<H>  ----------------------------<  126<H>  4.3   |  26  |  3.8<H>      Calcium, Total Serum: 9.2 mg/dL (07-11-20 @ 22:55)      LFTs:             7.3  | 0.3  | 18       ------------------[107     ( 10 Jul 2020 14:26 )  4.7  | x    | 8           Lipase:x      Amylase:x         Blood Gas Venous - Lactate: 1.0 mmoL/L (07-10-20 @ 14:26)      Coags:     12.90  ----< 1.12    ( 10 Jul 2020 14:26 )     34.8        CARDIAC MARKERS ( 11 Jul 2020 19:17 )  x     / 0.21 ng/mL / x     / x     / x      CARDIAC MARKERS ( 11 Jul 2020 11:00 )  x     / 0.20 ng/mL / x     / x     / x      CARDIAC MARKERS ( 11 Jul 2020 05:11 )  x     / 0.20 ng/mL / x     / x     / x

## 2020-07-12 NOTE — PROGRESS NOTE ADULT - ASSESSMENT
1. Fall with mildly displaced left anterolateral 6th-9th and nondisplaced left posterior 8th-11th rib fractures. Adequate pain control.  2. Acute renal failure. Biopsy-proven ATN. Has underlying CKD secondary to diabetic nephropathy. HD MWF schedule. HD tomorrow: 3 hours, opti 160 dialyzer, 2K bath, 3L UF.  3. Anemia. On EPO with HD.

## 2020-07-12 NOTE — PROGRESS NOTE ADULT - SUBJECTIVE AND OBJECTIVE BOX
[facility] FOLLOW UP NOTE  --------------------------------------------------------------------------------  24 hour events/subjective: Patient examined. Appears comfortable.    PAST HISTORY  --------------------------------------------------------------------------------  No significant changes to PMH, PSH, FHx, SHx, unless otherwise noted    ALLERGIES & MEDICATIONS  --------------------------------------------------------------------------------  Allergies    No Known Allergies    Intolerances      Standing Inpatient Medications  acetaminophen   Tablet .. 650 milliGRAM(s) Oral every 6 hours  aMIOdarone    Tablet 200 milliGRAM(s) Oral daily  aspirin  chewable 81 milliGRAM(s) Oral daily  atorvastatin 40 milliGRAM(s) Oral at bedtime  chlorhexidine 4% Liquid 1 Application(s) Topical <User Schedule>  heparin   Injectable 5000 Unit(s) SubCutaneous every 8 hours  hydrALAZINE 25 milliGRAM(s) Oral three times a day  insulin lispro (HumaLOG) corrective regimen sliding scale   SubCutaneous three times a day before meals  isosorbide   dinitrate Tablet (ISORDIL) 10 milliGRAM(s) Oral three times a day  metoprolol succinate ER 25 milliGRAM(s) Oral daily  mupirocin 2% Nasal 1 Application(s) Nasal two times a day  pantoprazole    Tablet 40 milliGRAM(s) Oral before breakfast  sertraline 25 milliGRAM(s) Oral daily    PRN Inpatient Medications  lidocaine   Patch 1 Patch Transdermal every 24 hours PRN  ondansetron Injectable 4 milliGRAM(s) IV Push every 6 hours PRN  senna 2 Tablet(s) Oral at bedtime PRN      VITALS/PHYSICAL EXAM  --------------------------------------------------------------------------------  T(C): 36.1 (07-12-20 @ 08:00), Max: 36.2 (07-11-20 @ 12:00)  HR: 72 (07-12-20 @ 10:00) (60 - 80)  BP: 145/64 (07-12-20 @ 10:00) (105/53 - 148/55)  RR: 21 (07-12-20 @ 10:00) (0 - 24)  SpO2: 97% (07-12-20 @ 10:00) (91% - 97%)  Wt(kg): --  Height (cm): 157.5 (07-11-20 @ 00:30)  Weight (kg): 53.5 (07-11-20 @ 00:30)  BMI (kg/m2): 21.6 (07-11-20 @ 00:30)  BSA (m2): 1.53 (07-11-20 @ 00:30)      07-11-20 @ 07:01  -  07-12-20 @ 07:00  --------------------------------------------------------  IN: 200 mL / OUT: 1790 mL / NET: -1590 mL    07-12-20 @ 07:01  -  07-12-20 @ 11:05  --------------------------------------------------------  IN: 0 mL / OUT: 25 mL / NET: -25 mL      Physical Exam:  	Gen: NAD  	Pulm: CTA B/L  	CV: RRR, S1S2  	Abd: +BS, soft, nontender/nondistended  	: No suprapubic tenderness  	LE: Warm,  edema  	Vascular access: TDC    LABS/STUDIES  --------------------------------------------------------------------------------              8.9    4.68  >-----------<  147      [07-12-20 @ 00:27]              28.7     142  |  103  |  63  ----------------------------<  119      [07-12-20 @ 00:27]  4.3   |  26  |  3.8        Ca     9.2     [07-12-20 @ 00:27]      Mg     2.1     [07-12-20 @ 00:27]      Phos  4.3     [07-12-20 @ 00:27]    TPro  7.3  /  Alb  4.7  /  TBili  0.3  /  DBili  x   /  AST  18  /  ALT  8   /  AlkPhos  107  [07-10-20 @ 14:26]    PT/INR: PT 12.90, INR 1.12       [07-10-20 @ 14:26]  PTT: 34.8       [07-10-20 @ 14:26]    Troponin 0.21      [07-11-20 @ 19:17]    Creatinine Trend:  SCr 3.8 [07-12 @ 00:27]  SCr 3.8 [07-11 @ 22:55]  SCr 5.4 [07-11 @ 05:11]  SCr 5.4 [07-11 @ 00:57]  SCr 4.8 [07-10 @ 14:26]        Iron 55, TIBC 167, %sat 33      [05-27-20 @ 13:22]  Ferritin 636      [05-27-20 @ 13:22]  HbA1c 5.7      [11-09-19 @ 01:20]  TSH 0.58      [08-02-19 @ 18:37]  Lipid: chol 153, , HDL 45, LDL 75      [08-30-19 @ 07:16]

## 2020-07-12 NOTE — CONSULT NOTE ADULT - SUBJECTIVE AND OBJECTIVE BOX
HPI:  Patient is a 69 year-old female with PMHx of CAD s/p CABG (November 2019), CHF (EF 40%), ESRD on hemodialysis MWF, chronic pleural effusions on the left for which she get thoracentesis periodically, bladder prolapse requiring chronic Castellon placed 6 weeks ago, Diabetes Type 2 on insulin. She presents after mechanical ground level fall yesterday, where she tripped while trying to put on her pants and fell hitting her left side on the edge of the bathtub. She complains of sharp pain to her left rib cage and back. She denies any LOC or other injuries. Patient reported to an urgent care where CXR showed displaced left-sided rib fractures. She was transferred to [facility] where CT scan shows mildly displaced left anterolateral 6th-9th and nondisplaced left posterior 8th-11th rib fractures. IS on admission is 500 mL.     Patient also has PMHx significant for ESRD, and has been on HD MWF for the past two months via right Tesio dialysis catheter. She reports her dry weight is 112 pounds. She also has had urinary retention secondary to bladder prolapse and has had a chronic Castellon for 6 weeks. She says her DM is well controlled and she is compliant with her insulin. She has CHF and complains of intermittent lower leg edema, sometimes worse on one side than the other but not always the same side. (10 Jul 2020 17:54)      PAST MEDICAL & SURGICAL HISTORY:  Pleural effusion due to congestive heart failure  Congestive heart failure (CHF)  Female bladder prolapse  Heart failure  Diabetes  History of thoracentesis  S/P CABG (coronary artery bypass graft)  History of repair of hip fracture      Hospital Course:  At baseline she walks with a walker and has DM neuropathy. She has signif pain with her left sided rib fxs. She requests to go home with home PT, her  and dtr in law.   TODAY'S SUBJECTIVE & REVIEW OF SYMPTOMS:     Constitutional WNL   Cardio WNL   Resp WNL   GI WNL  Heme WNL  Endo WNL  Skin WNL  MSK WNL  Neuro WNL  Cognitive WNL  Psych WNL      MEDICATIONS  (STANDING):  acetaminophen   Tablet .. 650 milliGRAM(s) Oral every 6 hours  aMIOdarone    Tablet 200 milliGRAM(s) Oral daily  aspirin  chewable 81 milliGRAM(s) Oral daily  atorvastatin 40 milliGRAM(s) Oral at bedtime  chlorhexidine 4% Liquid 1 Application(s) Topical <User Schedule>  cholecalciferol 400 Unit(s) Oral daily  epoetin mana-epbx (RETACRIT) Injectable 25025 Unit(s) IV Push <User Schedule>  heparin   Injectable 5000 Unit(s) SubCutaneous every 8 hours  hydrALAZINE 25 milliGRAM(s) Oral three times a day  insulin lispro (HumaLOG) corrective regimen sliding scale   SubCutaneous three times a day before meals  isosorbide   dinitrate Tablet (ISORDIL) 10 milliGRAM(s) Oral three times a day  metoprolol succinate ER 25 milliGRAM(s) Oral daily  mupirocin 2% Nasal 1 Application(s) Nasal two times a day  pantoprazole    Tablet 40 milliGRAM(s) Oral before breakfast  sertraline 25 milliGRAM(s) Oral daily  torsemide 20 milliGRAM(s) Oral two times a day    MEDICATIONS  (PRN):  lidocaine   Patch 1 Patch Transdermal every 24 hours PRN rib fx  senna 2 Tablet(s) Oral at bedtime PRN Constipation      FAMILY HISTORY:  FH: myocardial infarction (Mother)  FH: stomach cancer (Mother)      Allergies    No Known Allergies    Intolerances        SOCIAL HISTORY:    [  ] Etoh  [  ] Smoking  [  ] Substance abuse     Home Environment:  [  ] Home Alone  [ x ] Lives with Family-  [  ] Home Health Aid    Dwelling:  [  ] Apartment  [  ] Private House  [  ] Adult Home  [  ] Skilled Nursing Facility      [  ] Short Term  [  ] Long Term  [  ] Stairs       Elevator [  ]    FUNCTIONAL STATUS PTA: (Check all that apply)  Ambulation: [   ]Independent    [  ] Dependent     [  ] Non-Ambulatory  Assistive Device: [  ] SA Cane  [  ]  Q Cane  [ x ] Walker  [  ]  Wheelchair  ADL : [  ] Independent  [  ]  Dependent       Vital Signs Last 24 Hrs  T(C): 36.6 (12 Jul 2020 14:00), Max: 36.6 (12 Jul 2020 14:00)  T(F): 97.8 (12 Jul 2020 14:00), Max: 97.8 (12 Jul 2020 14:00)  HR: 76 (12 Jul 2020 14:00) (70 - 80)  BP: 133/61 (12 Jul 2020 14:00) (111/48 - 159/72)  BP(mean): 107 (12 Jul 2020 10:00) (74 - 107)  RR: 18 (12 Jul 2020 14:00) (0 - 21)  SpO2: 97% (12 Jul 2020 14:52) (91% - 97%)      PHYSICAL EXAM: Alert & Oriented X3  GENERAL: NAD, well-groomed, well-developed  HEAD:  Atraumatic, Normocephalic  EYES: EOMI, PERRLA, conjunctiva and sclera clear  NECK: Supple, No JVD, Normal thyroid  CHEST/LUNG: Clear  HEART: Regular rate and rhythm; No murmurs, rubs, or gallops  ABDOMEN: Soft, Nontender, Nondistended; Bowel sounds present  EXTREMITIES:  2+ Peripheral Pulses, No clubbing, cyanosis, or edema    NERVOUS SYSTEM:  Cranial Nerves 2-12 intact [  ] Abnormal  [  ]  ROM: WFL all extremities [  ]  Abnormal [  ]  Motor Strength: WFL all extremities  [  ]  Abnormal [ x ]  LUE 4-/5 with pain  Sensation: intact to light touch [  ] Abnormal [ x ]   decreased PP feet  Reflexes: Symmetric [  ]  Abnormal [  ]    FUNCTIONAL STATUS:  Bed Mobility: Independent [  ]  Supervision [  ]  Needs Assistance [  ]  N/A [  ]  Transfers: Independent [  ]  Supervision [  ]  Needs Assistance [  ]  N/A [  ]   Ambulation: Independent [  ]  Supervision [  ]  Needs Assistance [  ]  N/A [  ]  ADL: Independent [  ] Requires Assistance [  ] N/A [  ]      LABS:                        8.9    4.68  )-----------( 147      ( 12 Jul 2020 00:27 )             28.7     07-12    142  |  103  |  63<HH>  ----------------------------<  119<H>  4.3   |  26  |  3.8<H>    Ca    9.2      12 Jul 2020 00:27  Phos  4.3     07-12  Mg     2.1     07-12            RADIOLOGY & ADDITIONAL STUDIES:    Assesment:

## 2020-07-12 NOTE — CHART NOTE - NSCHARTNOTEFT_GEN_A_CORE
68 y/o F HD 2 presents s/p fall from standing sustaining acute left sided rib fxs upgraded to SICU for close HD and resp monitoring.     Of note, patient has a past hx of ESRD on HD MWF ( right tessio), chronic  left sided pleural effusions for which she receives periodic thoracentesis,  bladder prolapse requiring chronic Castellon placed 6 weeks ago, DM, CHF 68 y/o F HD 2 presents s/p fall from standing sustaining acute left sided rib fxs upgraded to SICU for close HD and resp monitoring.     Of note, patient has a past hx of ESRD on HD MWF ( right tessio), chronic  left sided pleural effusions for which she receives periodic thoracentesis,  bladder prolapse requiring chronic Castellon placed 6 weeks ago, DM, CHF, afib, CAD s/p CABG( 11/2019), hip fx s/p left hip IM nail 2019.    Patient remained HDS with stable 02 sat, no longer meets criteria for ICU admission and is stable for d/g    Neurologic: AOx3, no neuro deficits  Pain control:  - Lidocaine patch q24h  - Tylenol    Depression:  - Continue Sertraline    Insomnia:  - Cont melatonin    Respiratory:  Acute left sided rib fx: 6-9 chau lateral, 8-11 posterior  - IS - pulling 1L  - PT/OOBTC  - CXR  - Pain control    Cardiovascular:  HFrEf/CABG:  - Echo 5/17 ef 20-25, mod-severe mitral regurg, mod aortic stenosis, mild aortic regurg, mild pulmonary htn  - Cont home hydralazine, Isosorbide dinitrate, torsemide, statin, ASA  - Cardiologist Dr. Gandara/Dr. Mancia- called and left message informing him that patient in hospital     Afib:  -Cont amio, metoprolol  - EKG NSR  - Maintain MAP >65     Gastrointestinal/Nutrition:  Diet:  - Renal diet    Prophylaxis:  - PPI  - Senna    Genitourinary/Renal:  CKD ( 2/2 diabetic nephropathy, R tessio, HD MWF, Dr. Peguero nephrologist)  - Last HD 7/11, plan for HD jonathan  -  EPO  - Monitor Bun/Cr    Uterine prolapse:  - Chronic indwelling Castellon  - Dr. Nadeen Rodriguez Ob-gyn following     Hematologic:  Acute blood loss anemia 2/2 renal failure  - Monitor  - Transfuse for hg <7    Prophylaxis:  - Hep Sq  - SCD    Infectious Disease:  - Afebrile  - No indiction for abx  - Monitor labs    Endocrine:  DM:  - ISS    Disposition: D/g signed out to Dr. Crowe at 11:45 70 y/o F HD 2 presents s/p fall from standing sustaining acute left sided rib fxs upgraded to SICU for close HD and resp monitoring.     Of note, patient has a past hx of ESRD on HD MWF ( right tessio), chronic  left sided pleural effusions for which she receives periodic thoracentesis,  bladder prolapse requiring chronic Castellon placed 6 weeks ago, DM, CHF, afib, CAD s/p CABG( 11/2019), hip fx s/p left hip IM nail 2019.    Patient remained HDS with stable 02 sat, no longer meets criteria for ICU admission and is stable for d/g    Neurologic: AOx3, no neuro deficits  Pain control:  - Lidocaine patch q24h  - Tylenol    Depression:  - Continue Sertraline    Insomnia:  - Cont melatonin    Respiratory:  Acute left sided rib fx: 6-9 chau lateral, 8-11 posterior  - IS - pulling 1L  - PT/OOBTC  - CXR  - Pain control    ***   - Tiotropium restarted     Cardiovascular:  HFrEf/CABG:  - Echo 5/17 ef 20-25, mod-severe mitral regurg, mod aortic stenosis, mild aortic regurg, mild pulmonary htn  - Cont home hydralazine, Isosorbide dinitrate, torsemide, statin, ASA  - Cardiologist Dr. Gandara/Dr. Mancia- called and left message informing him that patient in hospital     Afib:  -Cont amio, metoprolol  - EKG NSR  - Maintain MAP >65     Gastrointestinal/Nutrition:  Diet:  - Renal diet    Prophylaxis:  - PPI  - Senna    Genitourinary/Renal:  CKD ( 2/2 diabetic nephropathy, R tessio, HD MWF, Dr. Peguero nephrologist)  - Last HD 7/11, plan for HD jonathan  -  EPO  - Monitor Bun/Cr    Uterine prolapse:  - Chronic indwelling Castellon  - Dr. Nadeen Rodriguez Ob-gyn following     Hematologic:  Acute blood loss anemia 2/2 renal failure  - Monitor  - Transfuse for hg <7    Prophylaxis:  - Hep Sq  - SCD    Infectious Disease:  - Afebrile  - No indiction for abx  - Monitor labs    Endocrine:  DM:  - ISS    Disposition: D/g signed out to Dr. Crowe at 11:45 68 y/o F HD 2 presents s/p fall from standing sustaining acute left sided rib fxs upgraded to SICU for close HD and resp monitoring.     Of note, patient has a past hx of ESRD on HD MWF ( right tessio), chronic  left sided pleural effusions for which she receives periodic thoracentesis,  bladder prolapse requiring chronic Castellon placed 6 weeks ago, DM, CHF, afib, CAD s/p CABG( 11/2019), hip fx s/p left hip IM nail 2019.    Patient remained HDS with stable 02 sat, no longer meets criteria for ICU admission and is stable for d/g    Neurologic: AOx3, no neuro deficits  Pain control:  - Lidocaine patch q24h  - Tylenol    Depression:  - Continue Sertraline    Insomnia:  - Cont melatonin    Respiratory:  Acute left sided rib fx: 6-9 chau lateral, 8-11 posterior  - IS - pulling 1L  - PT/OOBTC  - NC as needed, maintain 02 sat >94%  - CXR  - Pain control    Pleural effusions, left:   - Recurrent, receives periodic thoracentesis last about 8 months ago by ED physician     Cardiovascular:  HFrEf/CABG:  - Echo 5/17 ef 20-25, mod-severe mitral regurg, mod aortic stenosis, mild aortic regurg, mild pulmonary htn  - Cont home hydralazine, Isosorbide dinitrate, torsemide, statin, ASA  - Cardiologist Dr. Gandara/Dr. Mancia- called and left message informing him that patient in hospital     Afib:  -Cont amio, metoprolol  - EKG NSR  - Maintain MAP >65     Gastrointestinal/Nutrition:  Diet:  - Renal diet    Prophylaxis:  - PPI  - Senna    Genitourinary/Renal:  CKD ( 2/2 diabetic nephropathy, R tessio, HD MWF, Dr. Peguero nephrologist)  - Last HD 7/11, plan for HD jonathan  -  EPO  - Monitor Bun/Cr    Uterine prolapse:  - Chronic indwelling Castellon  - Dr. Nadeen Rodriguez Ob-gyn following     Hematologic:  Acute blood loss anemia 2/2 renal failure  - Monitor  - Transfuse for hg <7    Prophylaxis:  - Hep Sq  - SCD    Infectious Disease:  - Afebrile  - No indiction for abx  - Monitor labs    Endocrine:  DM:  - ISS    Disposition: D/g signed out to Dr. Crowe at 11:45 70 y/o F HD 2 presents s/p fall from standing sustaining acute left sided rib fxs upgraded to SICU for close HD and resp monitoring.     Of note, patient has a past hx of ESRD on HD MWF ( right tessio), chronic  left sided pleural effusions for which she receives periodic thoracentesis,  bladder prolapse requiring chronic Castellon placed 6 weeks ago, DM, CHF, afib, CAD s/p CABG( 11/2019), hip fx s/p left hip IM nail 2019.    Patient remained HDS with stable 02 sat, no longer meets criteria for ICU admission and is stable for d/g    Neurologic: AOx3, no neuro deficits  Pain control:  - Lidocaine patch q24h  - Tylenol    Depression:  - Continue Sertraline    Insomnia:  - Cont melatonin    Respiratory:  Acute left sided rib fx: 6-9 chau lateral, 8-11 posterior  - IS - pulling 1L  - PT/OOBTC  - NC as needed, maintain 02 sat >94%  - CXR  - Pain control    Pleural effusions, left:   - Recurrent, receives periodic thoracentesis last about 8 months ago by ED physician   - Monitor CXR    Cardiovascular:  HFrEf/CABG:  - Echo 5/17 ef 20-25, mod-severe mitral regurg, mod aortic stenosis, mild aortic regurg, mild pulmonary htn  - Cont home hydralazine, Isosorbide dinitrate, torsemide, statin, ASA  - Cardiologist Dr. Gandara/Dr. Mancia- called and left message informing him that patient in hospital     Afib:  -Cont amio, metoprolol  - EKG NSR  - Maintain MAP >65     Gastrointestinal/Nutrition:  Diet:  - Renal diet    Prophylaxis:  - PPI  - Senna    Genitourinary/Renal:  CKD ( 2/2 diabetic nephropathy, R tessio, HD MWF, Dr. Peguero nephrologist)  - Last HD 7/11, plan for HD jonathan  -  EPO  - Monitor Bun/Cr    Uterine prolapse:  - Chronic indwelling Castellon  - Dr. Nadeen Rodriguez Ob-gyn following     Hematologic:  Acute blood loss anemia 2/2 renal failure  - Monitor  - Transfuse for hg <7    Prophylaxis:  - Hep Sq  - SCD    Infectious Disease:  - Afebrile  - No indiction for abx  - Monitor labs    Endocrine:  DM:  - ISS    Disposition: D/g signed out to Dr. Crowe at 11:45 70 y/o F HD 2 presents s/p fall from standing sustaining acute left sided rib fxs upgraded to SICU for close HD and resp monitoring.     Of note, patient has a past hx of ESRD on HD MWF ( right tesio), chronic  left sided pleural effusions for which she receives periodic thoracentesis,  bladder prolapse requiring chronic Castellon placed 6 weeks ago, DM, CHF, afib, CAD s/p CABG( 11/2019), hip fx s/p left hip IM nail 2019.    Patient remained HDS with stable 02 sat, no longer meets criteria for ICU admission and is stable for d/g    Neurologic: AOx3, no neuro deficits  Pain control:  - Lidocaine patch q24h  - Tylenol    Depression:  - Continue Sertraline    Insomnia:  - Cont melatonin    Respiratory:  Acute left sided rib fx: 6-9 chau lateral, 8-11 posterior  - IS - pulling 1L  - PT/OOBTC  - NC as needed, maintain 02 sat >94%  - CXR  - Pain control    Pleural effusions, left:   - Recurrent, receives periodic thoracentesis last about 8 months ago by ED physician   - Monitor CXR    Cardiovascular:  HFrEf/CABG:  - Echo 5/17 ef 20-25, mod-severe mitral regurg, mod aortic stenosis, mild aortic regurg, mild pulmonary htn  - Cont home hydralazine, Isosorbide dinitrate, torsemide, statin, ASA  - Cardiologist Dr. Gandara/Dr. Mancia- called and left message informing him that patient in hospital     Afib:  -Cont amio, metoprolol  - EKG NSR  - Maintain MAP >65     Gastrointestinal/Nutrition:  Diet:  - Renal diet    Prophylaxis:  - PPI  - Senna    Genitourinary/Renal:  CKD ( 2/2 diabetic nephropathy, R carlyn, HD MWF, Dr. Peguero nephrologist)  - Last HD 7/11, plan for HD jonathan  -  EPO  - Monitor Bun/Cr    Uterine prolapse:  - Chronic indwelling Castellon  - Dr. Nadeen Rodriguez Ob-gyn following     Hematologic:  Acute blood loss anemia 2/2 renal failure  - Monitor  - Transfuse for hg <7    Prophylaxis:  - Hep Sq  - SCD    Infectious Disease:  - Afebrile  - No indiction for abx  - Monitor labs    Endocrine:  DM:  - ISS    Disposition: D/g signed out to Dr. Crowe at 11:45

## 2020-07-12 NOTE — PHARMACOTHERAPY INTERVENTION NOTE - COMMENTS
md ordered  vitamin D3  400  units  po daily  , NF,    recommended  md to change to  formulary   1000 units po  daily

## 2020-07-12 NOTE — PROGRESS NOTE ADULT - ASSESSMENT
69 year-old female with acute left rib fx; ribs 6-9 ribs (chau-lateral), left posterior ribs 8-11    CNS  - no LOC  - continue melatonin, setraline  - lidoderm patch for pain    Pulm  - multiple L sided rib fx  - incentive spirometry/pulmonary toilet    CV  - hx of congestive heart failure with EF 20-25% with severely decreased LV function, moderate to severe mitral regurg, on 5/17/2020. Recurrent thoracentesis for recurrent L pleural effusions  - on statin, hydralazine 25, nitrate, torsemide, metoprolol 50 ER    GI  - bowel regimen, restart PPI  - consistent carb diet      - chronic indwelling catheter for bladder prolapse.   - hx of chronic uterine prolapse - See Dr. Nadeen Rodriguez from OB/GYN  - Dialysis MWF - sees Dr. Liza Peguero    Heme  -Hgb 8.9 (7.2 in May)  -Hep SQ for prophylaxis    ID  - afebrile  - no need for antibiotics    MSK  - PT eval    Endo  - continue home lantus 5 units at bedtime and SSI   - qhs fingersticks

## 2020-07-13 NOTE — DISCHARGE NOTE PROVIDER - CARE PROVIDER_API CALL
[facility] Maldonado [facility]  Surgery Critical Care-Surgery  [address] [place] [id number]  Phone: [phone]  Fax: [phone]  Follow Up Time:

## 2020-07-13 NOTE — PROGRESS NOTE ADULT - SUBJECTIVE AND OBJECTIVE BOX
[place] [facility] FOLLOW UP NOTE  --------------------------------------------------------------------------------  24 hour events/subjective: Patient examined during HD. Appears comfortable.    PAST HISTORY  --------------------------------------------------------------------------------  No significant changes to PMH, PSH, FHx, SHx, unless otherwise noted    ALLERGIES & MEDICATIONS  --------------------------------------------------------------------------------  Allergies    No Known Allergies    Intolerances      Standing Inpatient Medications  acetaminophen   Tablet .. 650 milliGRAM(s) Oral every 6 hours  aMIOdarone    Tablet 200 milliGRAM(s) Oral daily  aspirin  chewable 81 milliGRAM(s) Oral daily  atorvastatin 40 milliGRAM(s) Oral at bedtime  chlorhexidine 4% Liquid 1 Application(s) Topical <User Schedule>  cholecalciferol 1000 Unit(s) Oral daily  epoetin mana-epbx (RETACRIT) Injectable 12340 Unit(s) IV Push <User Schedule>  gabapentin 300 milliGRAM(s) Oral three times a day  heparin   Injectable 5000 Unit(s) SubCutaneous every 8 hours  hydrALAZINE 25 milliGRAM(s) Oral three times a day  insulin lispro (HumaLOG) corrective regimen sliding scale   SubCutaneous three times a day before meals  isosorbide   dinitrate Tablet (ISORDIL) 10 milliGRAM(s) Oral three times a day  methocarbamol 500 milliGRAM(s) Oral three times a day  metoprolol succinate ER 25 milliGRAM(s) Oral daily  mupirocin 2% Nasal 1 Application(s) Nasal two times a day  pantoprazole    Tablet 40 milliGRAM(s) Oral before breakfast  sertraline 25 milliGRAM(s) Oral daily  torsemide 20 milliGRAM(s) Oral two times a day    PRN Inpatient Medications  lidocaine   Patch 1 Patch Transdermal every 24 hours PRN  senna 2 Tablet(s) Oral at bedtime PRN      VITALS/PHYSICAL EXAM  --------------------------------------------------------------------------------  T(C): 36.2 (07-13-20 @ 06:00), Max: 36.6 (07-12-20 @ 14:00)  HR: 77 (07-13-20 @ 08:15) (75 - 81)  BP: 125/62 (07-13-20 @ 08:15) (113/56 - 165/72)  RR: 18 (07-13-20 @ 08:15) (18 - 18)  SpO2: 98% (07-13-20 @ 08:15) (93% - 98%)  Wt(kg): --  Height (cm): 157.5 (07-12-20 @ 11:50)  Weight (kg): 50.802 (07-12-20 @ 11:50)  BMI (kg/m2): 20.5 (07-12-20 @ 11:50)  BSA (m2): 1.49 (07-12-20 @ 11:50)      07-12-20 @ 07:01  -  07-13-20 @ 07:00  --------------------------------------------------------  IN: 170 mL / OUT: 175 mL / NET: -5 mL      Physical Exam:  	Gen: NAD  	Pulm: CTA B/L  	CV: RRR, S1S2  	Abd: +BS, soft, nontender/nondistended  	: No suprapubic tenderness  	LE: Warm, FROM, no clubbing, intact strength; no edema  	Vascular access:     LABS/STUDIES  --------------------------------------------------------------------------------              8.6    5.57  >-----------<  166      [07-12-20 @ 23:21]              27.7     139  |  100  |  84  ----------------------------<  98      [07-12-20 @ 23:21]  5.0   |  23  |  4.7        Ca     9.3     [07-12-20 @ 23:21]      Mg     2.2     [07-12-20 @ 23:21]      Phos  6.5     [07-12-20 @ 23:21]      PT/INR: PT 14.30, INR 1.24       [07-12-20 @ 23:21]  PTT: 37.1       [07-12-20 @ 23:21]    Troponin 0.21      [07-11-20 @ 19:17]    Creatinine Trend:  SCr 4.7 [07-12 @ 23:21]  SCr 3.8 [07-12 @ 00:27]  SCr 3.8 [07-11 @ 22:55]  SCr 5.4 [07-11 @ 05:11]  SCr 5.4 [07-11 @ 00:57]        Iron 55, TIBC 167, %sat 33      [05-27-20 @ 13:22]  Ferritin 636      [05-27-20 @ 13:22]  HbA1c 5.7      [11-09-19 @ 01:20]  TSH 0.58      [08-02-19 @ 18:37]  Lipid: chol 153, , HDL 45, LDL 75      [08-30-19 @ 07:16]

## 2020-07-13 NOTE — CONSULT NOTE ADULT - SUBJECTIVE AND OBJECTIVE BOX
Date of Admission:    CHIEF COMPLAINT: Fall    HISTORY OF PRESENT ILLNESS: 69yFemale with PMH below presented to the hospital for trip and fall at home with broken ribs.  Fell good today and wants to go home     PAST MEDICAL & SURGICAL HISTORY:  Pleural effusion due to congestive heart failure  Congestive heart failure (CHF)  Female bladder prolapse  Heart failure  Diabetes  History of thoracentesis  S/P CABG (coronary artery bypass graft)  History of repair of hip fracture    HEALTH ISSUES - PROBLEM Dx:        FAMILY HISTORY:  FH: myocardial infarction (Mother)  FH: stomach cancer (Mother)    Allergies    No Known Allergies    Intolerances    	  Home Medications:  ascorbic acid 500 mg oral tablet: 1 tab(s) orally once a day (12 Jul 2020 12:19)  aspirin 81 mg oral tablet, chewable: 1 tab(s) orally once a day (12 Jul 2020 12:19)  atorvastatin 40 mg oral tablet: 1 tab(s) orally once a day (at bedtime) (12 Jul 2020 12:19)  lactobacillus acidophilus oral capsule: 1 tab(s) orally 3 times a day (12 Jul 2020 12:19)  sertraline 25 mg oral tablet: 1 tab(s) orally once a day (12 Jul 2020 12:19)    MEDICATIONS  (STANDING):  acetaminophen   Tablet .. 650 milliGRAM(s) Oral every 6 hours  aMIOdarone    Tablet 200 milliGRAM(s) Oral daily  aspirin  chewable 81 milliGRAM(s) Oral daily  atorvastatin 40 milliGRAM(s) Oral at bedtime  chlorhexidine 4% Liquid 1 Application(s) Topical <User Schedule>  cholecalciferol 1000 Unit(s) Oral daily  epoetin mana-epbx (RETACRIT) Injectable 69003 Unit(s) IV Push <User Schedule>  gabapentin 300 milliGRAM(s) Oral three times a day  heparin   Injectable 5000 Unit(s) SubCutaneous every 8 hours  hydrALAZINE 25 milliGRAM(s) Oral three times a day  insulin lispro (HumaLOG) corrective regimen sliding scale   SubCutaneous three times a day before meals  isosorbide   dinitrate Tablet (ISORDIL) 10 milliGRAM(s) Oral three times a day  methocarbamol 500 milliGRAM(s) Oral three times a day  metoprolol succinate ER 25 milliGRAM(s) Oral daily  mupirocin 2% Nasal 1 Application(s) Nasal two times a day  pantoprazole    Tablet 40 milliGRAM(s) Oral before breakfast  sertraline 25 milliGRAM(s) Oral daily  torsemide 20 milliGRAM(s) Oral two times a day    MEDICATIONS  (PRN):  lidocaine   Patch 1 Patch Transdermal every 24 hours PRN rib fx  senna 2 Tablet(s) Oral at bedtime PRN Constipation              SOCIAL HISTORY:    x] Non-smoker  [ ] Smoker  [ ] Alcohol      REVIEW OF SYSTEMS:  CONSTITUTIONAL: No fever, weight loss, or fatigue  CARDIOLOGY: PAtient denies chest pain, shortness of breath or syncopal episodes.   RESPIRATORY: denies shortness of breath, wheezeing.   NEUROLOGICAL: NO weakness, no focal deficits to report.  ENDOCRINOLOGICAL: no recent change in diabetic medications.   GI: no BRBPR, no N,V,diarrhea.    PSYCHIATRY: normal mood and affect  HEENT: no nasal discharge, no ecchymosis  SKIN: no ecchymosis, no breakdown  MUSCULOSKELETAL: Full range of motion x4.      PHYSICAL EXAM:  T(C): 36.2 (07-13-20 @ 06:00), Max: 36.6 (07-12-20 @ 14:00)  HR: 81 (07-13-20 @ 06:00) (70 - 81)  BP: 163/71 (07-13-20 @ 06:00) (113/56 - 165/72)  RR: 18 (07-13-20 @ 06:00) (14 - 21)  SpO2: 95% (07-13-20 @ 05:00) (93% - 97%)  Wt(kg): --  I&O's Summary    12 Jul 2020 07:01  -  13 Jul 2020 07:00  --------------------------------------------------------  IN: 170 mL / OUT: 175 mL / NET: -5 mL      Daily Height in cm: 157.48 (12 Jul 2020 11:50)    Daily     General Appearance: Normal	  Cardiovascular: Normal S1 S2, No JVD, No murmurs, No edema  Respiratory: Lungs clear to auscultation	  Psychiatry: A & O x 3, Mood & affect appropriate  Gastrointestinal:  Soft, Non-tender  Skin: No rashes, No ecchymoses, No cyanosis	  Neurologic: Non-focal  Extremities: Normal range of motion, No clubbing, cyanosis or edema  Vascular: Peripheral pulses palpable 2+ bilaterally        LABS:	 	                          8.6    5.57  )-----------( 166      ( 12 Jul 2020 23:21 )             27.7     07-12    139  |  100  |  84<HH>  ----------------------------<  98  5.0   |  23  |  4.7<HH>    Ca    9.3      12 Jul 2020 23:21  Phos  6.5     07-12  Mg     2.2     07-12      CARDIAC MARKERS ( 11 Jul 2020 19:17 )  x     / 0.21 ng/mL / x     / x     / x      CARDIAC MARKERS ( 11 Jul 2020 11:00 )  x     / 0.20 ng/mL / x     / x     / x          PT/INR - ( 12 Jul 2020 23:21 )   PT: 14.30 sec;   INR: 1.24 ratio         PTT - ( 12 Jul 2020 23:21 )  PTT:37.1 sec    proBNP:   Lipid Profile:   HgA1c:   TSH:       CARDIAC MARKERS:            TELEMETRY EVENTS: 	    ECG:  	  RADIOLOGY:  OTHER: 	    PREVIOUS DIAGNOSTIC TESTING:    [ ] Echocardiogram:  [ ]  Catheterization:  [ ] Stress Test:  	  	  ASSESSMENT/PLAN:

## 2020-07-13 NOTE — CONSULT NOTE ADULT - REASON FOR ADMISSION
Acute left rib fx; ribs 6-9 ribs (chau-lateral), left posterior ribs 8-11
Fall
Fall/fractured ribs
fall

## 2020-07-13 NOTE — PHYSICAL THERAPY INITIAL EVALUATION ADULT - GENERAL OBSERVATIONS, REHAB EVAL
[id number] pm. 70 y/o F rec'd/left in bed, nad, + montejo with leg bag. pt is A+Ox4, very motivated to get better and go home. presents with acute LT ribs fx pain limiting mobility. vitals: in bed at rest 165/71 81 93%, in bed post amb 159/72 80 94%. pt wears pull ups, became incontinent of BM on sit to stand >> assisted with holding the pt while PCA cleaned the pt.

## 2020-07-13 NOTE — CONSULT NOTE ADULT - ASSESSMENT
1. Fall with mildly displaced left anterolateral 6th-9th and nondisplaced left posterior 8th-11th rib fractures. Adequate pain control.  2. Acute renal failure. Biopsy-proven ATN. Has underlying CKD secondary to diabetic nephropathy. HD MWF schedule. Missed HD yesterday. HD today: 3 hours, opti 160 dialyzer, 2K bath, 3L UF.  3. Anemia. On EPO and Venofer with HD.
AFib post cardioversion  Restart DOACs when cleared from surgery  post fall and broken ribs  HD  HFrEf stable  resume home medications
IMPRESSION: Rehab of  gait abnl, DM neuropathy, left sided rib fractures, ESRD on HD    PRECAUTIONS: [ x ] Cardiac  [  ] Respiratory  [  ] Seizures [  ] Contact Isolation  [  ] Droplet Isolation  [  ] Other    Weight Bearing Status:     RECOMMENDATION:    Out of Bed to Chair     DVT/Decubiti Prophylaxis    REHAB PLAN:     [  x ] Bedside P/T 3-5 times a week   [   ]   Bedside O/T  2-3 times a week             [   ] No Rehab Therapy Indicated                   [   ]  Speech Therapy   Conditioning/ROM                                    ADL  Bed Mobility                                               Conditioning/ROM  Transfers                                                     Bed Mobility  Sitting /Standing Balance                         Transfers                                        Gait Training                                               Sitting/Standing Balance  Stair Training [   ]Applicable                    Home equipment Eval                                                                        Splinting  [   ] Only      GOALS:   ADL   [ x  ]   Independent                    Transfers  [ x  ] Independent                          Ambulation  [  x ] Independent     [  x  ] With device                            [   ]  CG                                                         [   ]  CG                                                                  [   ] CG                            [    ] Min A                                                   [   ] Min A                                                              [   ] Min  A          DISCHARGE PLAN:   [   ]  Good candidate for Intensive Rehabilitation/Hospital based-4A UH                                             Will tolerate 3hrs Intensive Rehab Daily                                       [x    ]  Short Term Rehab in Skilled Nursing Facility                                                           VS                                     [ x   ]  Home with Outpatient or VN services. She doesn 't appear interested in STR at [facility].                                         [    ]  Possible Candidate for Intensive Hospital based Rehab
69 year-old female with acute left rib fx; ribs 6-9 ribs (chau-lateral), left posterior ribs 8-11    CNS  - no LOC  - restart melatonin, setraline  - lidoderm patch for pain    Pulm  - multiple L sided rib fx  - incentive spirometry/pulmonary toilet    CV  - hx of congestive heart failure with EF 20-25% with severely decreased LV function, moderate to severe mitral regurg, on 5/17/2020). Recurrent thoracentesis for recurrent L pleural effusions  - on statin, hydralazine 25, nitrate, torsemide, metoprolol 50 ER    GI  - bowel regimen, restart PPI  - consistent carb diet      - chronic indwelling catheter for urinary prolapse. See Dr. Nadeen Rodriguez from OB/GYN  - K+ 5.7  - Dialysis MWF - sees Dr. Liza Peguero    Heme  -Hgb 10 (7.2 in May)  -Hep SQ for prophylaxis    ID  - afebrile, WBC 6    MSK  - PT eval    Endo  - restart home lantus 5 units at bedtime  - qhs fingersticks

## 2020-07-13 NOTE — DISCHARGE NOTE NURSING/CASE MANAGEMENT/SOCIAL WORK - PATIENT PORTAL LINK FT
You can access the FollowMyHealth Patient Portal offered by [facility] by registering at the following website: [email or web address]. By joining [facility]’s FollowMyHealth portal, you will also be able to view your health information using other applications (apps) compatible with our system.

## 2020-07-13 NOTE — PROGRESS NOTE ADULT - SUBJECTIVE AND OBJECTIVE BOX
GENERAL SURGERY PROGRESS NOTE     GUS WILBURN  69y  Female  Hospital day :3d    OVERNIGHT EVENTS: no acute events overnight     T(F): 97.3 (20 @ 02:00), Max: 97.8 (20 @ 14:00)  HR: 77 (20 @ 02:00) (70 - 78)  BP: 149/69 (20 @ 02:00) (113/56 - 165/72)  RR: 18 (20 @ 02:00) (6 - 21)  SpO2: 93% (20 @ 20:01) (91% - 97%)    DIET/FLUIDS: cholecalciferol 1000 Unit(s) Oral daily    URINE:   20 @ 07:01  -  20 @ 07:00  --------------------------------------------------------  OUT: 290 mL     Indwelling Urethral Catheter:     Connect To:  Leg Bag    Indication:  Urinary Retention / Obstruction    Additional Instructions:  Hx of bladder prolapse (20 @ 07:45)    GI proph:  pantoprazole    Tablet 40 milliGRAM(s) Oral before breakfast    AC/ proph: aspirin  chewable 81 milliGRAM(s) Oral daily  heparin   Injectable 5000 Unit(s) SubCutaneous every 8 hours    ABx:     PHYSICAL EXAM:  GENERAL: NAD, well-appearing  CHEST/LUN on IS  ABDOMEN: Soft, Nontender, Nondistended;       LABS  Labs:  CAPILLARY BLOOD GLUCOSE      POCT Blood Glucose.: 104 mg/dL (2020 20:45)  POCT Blood Glucose.: 101 mg/dL (2020 16:27)  POCT Blood Glucose.: 116 mg/dL (2020 13:38)  POCT Blood Glucose.: 101 mg/dL (2020 09:54)                          8.6    5.57  )-----------( 166      ( 2020 23:21 )             27.7       Auto Neutrophil %: 74.1 % (20 @ 23:21)  Auto Immature Granulocyte %: 0.4 % (20 @ 23:21)    07-12    139  |  100  |  84<HH>  ----------------------------<  98  5.0   |  23  |  4.7<HH>      Calcium, Total Serum: 9.3 mg/dL (20 @ 23:21)      LFTs:     Blood Gas Venous - Lactate: 1.0 mmoL/L (07-10-20 @ 14:26)      Coags:     14.30  ----< 1.24    ( 2020 23:21 )     37.1        CARDIAC MARKERS ( 2020 19:17 )  x     / 0.21 ng/mL / x     / x     / x      CARDIAC MARKERS ( 2020 11:00 )  x     / 0.20 ng/mL / x     / x     / x      CARDIAC MARKERS ( 2020 05:11 )  x     / 0.20 ng/mL / x     / x     / x

## 2020-07-13 NOTE — PHYSICAL THERAPY INITIAL EVALUATION ADULT - DIAGNOSIS, PT EVAL
Gait abnormality, diabetic neuropathy, LT ribs 6-9 fx (mild displacement), LT ribs 8-11 fx (non-displaced), ESRD on HD.

## 2020-07-13 NOTE — CONSULT NOTE ADULT - CONSULT REASON
ESRD
HFrEF
left sided rib fxs
Fall with acute left sided rib fx; -loc, -head trauma, -anticoagulation

## 2020-07-13 NOTE — DISCHARGE NOTE PROVIDER - NSDCCPCAREPLAN_GEN_ALL_CORE_FT
PRINCIPAL DISCHARGE DIAGNOSIS  Diagnosis: Rib fractures  Assessment and Plan of Treatment: Pt enocuraged to use incentive spirometry 10x/hr.   pt should f/u with primary care physiciana fter discharge and nephrologist.   Pt to use tylenol for pain as needed for rib fracture pain.  F/u in trauma clinic in 1-2 weeks.

## 2020-07-13 NOTE — DISCHARGE NOTE PROVIDER - NSDCFUSCHEDAPPT_GEN_ALL_CORE_FT
GUS WILBURN ; 07/15/2020 ; [facility] [address]  GUS WILBURN ; 07/31/2020 ; [facility] U[facility] [address]  GUS WILBURN ; 07/31/2020 ; [facility] [address] [facility] [place] GUS WILBURN ; 07/15/2020 ; [facility] Urogyne[facility] [address]  GUS WILBURN ; 07/31/2020 ; [facility] Urogyne[facility] [address]  GUS WILBURN ; 07/31/2020 ; [facility] [address] [facility]

## 2020-07-13 NOTE — PROGRESS NOTE ADULT - ATTENDING COMMENTS
doing well   s/p HD   pain well controlled   resume diet and home meds   transfer to floor.
feeling better   for HD today   resume diet and home meds   OOB and ambulate   possible transfer to floor.
multiple ribs fracture   pain well controlled   for HD today   discharge planning
s/p fall multiple ribs fracture ESRD on HD   pain control   missed scheduled HD   admitted to sicu   for HD today   possible transfer to floor post Dialysis

## 2020-07-13 NOTE — DISCHARGE NOTE PROVIDER - NSDCMRMEDTOKEN_GEN_ALL_CORE_FT
acetaminophen 325 mg oral tablet: 2 tab(s) orally every 6 hours  Admelog SoloStar 100 units/mL injectable solution: please see written insructions in discharge summary   aluminum hydroxide-magnesium hydroxide 200 mg-200 mg/5 mL oral suspension: 15 milliliter(s) orally 2 times a day, As Needed -Dyspepsia   amiodarone 200 mg oral tablet: 1 tab(s) orally once a day  ascorbic acid 500 mg oral tablet: 1 tab(s) orally once a day  aspirin 81 mg oral tablet, chewable: 1 tab(s) orally once a day  atorvastatin 40 mg oral tablet: 1 tab(s) orally once a day (at bedtime)  hydrALAZINE 25 mg oral tablet: 1 tab(s) orally 3 times a day, hold if SBP &lt;100  isosorbide dinitrate 10 mg oral tablet: 1 tab(s) orally 3 times a day hold if SBP&lt;100  lactobacillus acidophilus oral capsule: 1 tab(s) orally 3 times a day  Lantus Solostar Pen 100 units/mL subcutaneous solution: 5 unit(s) subcutaneous once a day (at bedtime); hold if glucose &lt;100   loperamide 2 mg oral capsule: 1 cap(s) orally every 4 hours, As needed, Diarrhea  melatonin 3 mg oral tablet: 1 tab(s) orally once a day (at bedtime)  metoprolol succinate 50 mg oral tablet, extended release: 1 tab(s) orally 2 times a day; hold if HR &lt;60 or SBP &lt;90  pantoprazole 40 mg oral delayed release tablet: 1 tab(s) orally once a day (before a meal)  sertraline 25 mg oral tablet: 1 tab(s) orally once a day  tiotropium 18 mcg inhalation capsule: 1 cap(s) inhaled once a day  torsemide 20 mg oral tablet: 1 tab(s) orally 2 times a day

## 2020-07-13 NOTE — PROGRESS NOTE ADULT - ASSESSMENT
1. Fall with mildly displaced left anterolateral 6th-9th and nondisplaced left posterior 8th-11th rib fractures. Adequate pain control.  2. Acute renal failure. Biopsy-proven ATN. Has underlying CKD secondary to diabetic nephropathy. HD MWF schedule. HD today: 3 hours, opti 160 dialyzer, 2K bath, 3L UF.  3. Anemia. On EPO with HD.

## 2020-07-13 NOTE — PROGRESS NOTE ADULT - ASSESSMENT
Assessment:  69y Female with multiple left sided rib fractures. On HD planned for today.    Plan:  - strict I's and O's  - pain control  - hemodynamic monitoring  - continue IS  - HD today  - rehab: SNF vs home with VNS  - SW for placement

## 2020-07-13 NOTE — DISCHARGE NOTE PROVIDER - HOSPITAL COURSE
70 yo F s/p fall -HT, -LOC, -AC. PMH of CAD s/p CABG (November 2019), CHF (EF 40%), ESRD on hemodialysis MWF, chronic pleural effusions on the left for which she has thoracentesis periodically, bladder prolapse requiring chronic Castellon placed 6 weeks ago, Diabetes Type 2 on insulin. She presented after mechanical ground level fall, where she tripped while trying to put on her pants and fell hitting her left side on the edge of the bathtub. She complains of sharp pain to her left rib cage and back. She denies any LOC or other injuries. Patient reported to an urgent care where CXR showed displaced left-sided rib fractures.         She was transferred to [facility] where CT scan shows mildly displaced left anterolateral 6th-9th and nondisplaced left posterior 8th-11th rib fractures.     Ptw as admitted to SICU for HD and close monitoring on 7/10 pt had K+of 5.7 . On 7/11 pt was downgraded to the floor and was pulling between 500 and 1000mL on IS. Using NC as needed. Pt seen by Physiatry on 7/12 who recommended d/c pt to SNF or home with VNS. Pt prefers to be D/C'd to Home.        Pt was encouraged to use IS and will be d/c'ed with one. Pt hemodynamically stable, saturating well on room air.        Pt seen by nephrology and cardiology while in the hospital, per cardiology OK to resume home meds. Pt receiving HD before D/C 10am7/13.

## 2020-07-13 NOTE — DISCHARGE NOTE PROVIDER - NSFOLLOWUPCLINICS_GEN_ALL_ED_FT
[facility]  Trauma Surgery  [address] [place] [address]  Phone: [phone]  Fax:   Follow Up Time: 2 weeks

## 2020-08-01 PROBLEM — I50.9 HEART FAILURE, UNSPECIFIED: Chronic | Status: ACTIVE | Noted: 2020-01-01

## 2020-08-06 NOTE — HISTORY OF PRESENT ILLNESS
[FreeTextEntry1] : 68 y/o F with h/o DM, CAD s/p CABG+ NUBIA clipping, ICM, chronic systolic HF, CKD on HD coming for follow up. Patient reports feeling better, she is still weak but less than before. She can walk in the house without as much difficulty as before.\par \par No c/o chest pain, LOC, nausea, vomiting.  \par

## 2020-08-29 NOTE — COUNSELING
[FreeTextEntry1] : \par Please call our office if you should experience a fever greater than 100.4 \par \par Please return to the office in 1 month for Castellon change and surgical counseling with Dr Rodriguez. \par

## 2020-08-29 NOTE — HISTORY OF PRESENT ILLNESS
[FreeTextEntry1] : Pt is here for 1 month follow up for montejo change. \par Last seen 6/17/2020 for montejo change. Last montejo change was 7/14/2020 when pt was admitted to the hospital s/p fall and fracturing ribs. \par \par RN\par \par H/o DM\par H/o MI\par H/o neuropathy, on Neurontin\par Currently pt is on Dialysis 3x a week.\par \par s/p CABG\par \par Failed pessary fitting \par Estrace cream for atrophy\par s/p Baclofen (hallucinations)\par s/p Flomax 0.4 mg (ANNA)\par \par Patient states she is doing well and states she continues to wish to move forward with uterovaginal prolapse surgery.

## 2020-08-29 NOTE — DISCUSSION/SUMMARY
[FreeTextEntry1] : Incomplete bladder emptying\par Patient placed into lithotomy position. 14 F montejo removed without difficulty with 20cc of cloudy urine drained from her bag.\par \par 14 Fr montejo catheter placed without difficulty using sterile technique for a return of 0cc of clear yellow urine. Instilled 10 cc of sterile water into the balloon. Leg bag attached without difficulty. Precautions provided.\par \par Patient will return in 1 month for her next montejo change/surgical consultation with Dr Rodriguez. \par

## 2020-09-11 PROBLEM — I50.23 ACUTE ON CHRONIC SYSTOLIC HEART FAILURE, NYHA CLASS 3: Status: ACTIVE | Noted: 2019-10-25

## 2020-09-17 NOTE — H&P PST ADULT - NSICDXPASTSURGICALHX_GEN_ALL_CORE_FT
PAST SURGICAL HISTORY:  History of repair of hip fracture 2019    History of thoracentesis     S/P CABG (coronary artery bypass graft) 11/2019

## 2020-09-17 NOTE — H&P PST ADULT - HISTORY OF PRESENT ILLNESS
69yFemale presents today for presurgical testing for             .   Patient denies any CP, palpitations, SOB, cough, or dysuria. No recent URI or UTI.  Stated exercise tolerance is FOS            DANNA screen reviewed    Patient denies any recent personal exposure to COVID19. Denies any sick contacts. Patient denies any travel within the past 30 days. Patient was instructed to quarantine until after procedure    Encounter for other preprocedural examination  End-stage renal disease  ^86 / 38113                                                                 [place] 9/24/20    FH: myocardial infarction (Mother)  FH: stomach cancer (Mother)  Encounter for other preprocedural examination  End-stage renal disease  Pleural effusion due to congestive heart failure  Congestive heart failure (CHF)  Female bladder prolapse  Heart failure  Female bladder prolapse  Diabetes  No pertinent past medical history  History of thoracentesis  S/P CABG (coronary artery bypass graft)  History of repair of hip fracture    Anesthesia Alert  NO--Difficult Airway  NO--History of neck surgery or radiation  NO--Limited ROM of neck  NO--History of Malignant hyperthermia  NO--No personal or family history of Pseudocholinesterase deficiency.  NO--Prior Anesthesia Complication  NO--Latex Allergy  NO--Loose teeth  NO--History of Rheumatoid Arthritis  NO--DANNA  NO--Other_____    Patient states that this is their complete medical history and list of medications 69yFemale presents today for presurgical testing for creation arterio venous fistula possible gortex graft left arm possible right arm. She presently has a Rt SC tessio catheter in place through which she gets dialysis MWF at [facility]. She states that she has a scheduled vein mapping to do today. Offers no complaints at this time.  Patient denies any CP, palpitations, SOB, cough, or dysuria. No recent URI or UTI.  Stated exercise tolerance is FOS 1/2 flight, walks with walker           DANNA screen reviewed    Patient denies any recent personal exposure to COVID19. Denies any sick contacts. Patient denies any travel within the past 30 days. Patient was instructed to quarantine until after procedure    Encounter for other preprocedural examination  End-stage renal disease  ^N18.6 / [id number]                                                                 [place] 9/24/20    FH: myocardial infarction (Mother)  FH: stomach cancer (Mother)  Encounter for other preprocedural examination  End-stage renal disease  Pleural effusion due to congestive heart failure  Congestive heart failure (CHF)  Female bladder prolapse  Heart failure  Female bladder prolapse  Diabetes  No pertinent past medical history  History of thoracentesis  S/P CABG (coronary artery bypass graft)  History of repair of hip fracture    Anesthesia Alert  NO--Difficult Airway  NO--History of neck surgery or radiation  NO--Limited ROM of neck  NO--History of Malignant hyperthermia  NO--No personal or family history of Pseudocholinesterase deficiency.  NO--Prior Anesthesia Complication  NO--Latex Allergy  NO--Loose teeth - full set dentures  NO--History of Rheumatoid Arthritis  NO--DANNA  NO--Other_____    Patient states that this is their complete medical history and list of medications ***At the time of her exam, the patient did not have with her a complete list of her current medications and was not able to contact her daughter for the list. Medication list was pulled from the pharmacy. Patient was instructed to bring her complete medication list with her the day of her procedure***

## 2020-09-17 NOTE — H&P PST ADULT - NSICDXPASTMEDICALHX_GEN_ALL_CORE_FT
PAST MEDICAL HISTORY:  Congestive heart failure (CHF)     Diabetes     Female bladder prolapse     Pleural effusion due to congestive heart failure

## 2020-09-17 NOTE — H&P PST ADULT - ADDITIONAL PE
FROM neck; full set of dentures upper and lower; Rt SC tessio catheter in place, dressing dry and intact.

## 2020-09-21 NOTE — HISTORY OF PRESENT ILLNESS
[FreeTextEntry1] : 70 y/o F with h/o DM, CAD s/p CABG+ NUBIA clipping, ICM, chronic systolic HF, CKD on HD coming for follow up. Patient reports feeling much better. She is being very active at home. She walks around without much difficulty. No significant SOB or chest pain reported. \par \par She is tolerating HD. \par

## 2020-09-21 NOTE — ASSESSMENT
[FreeTextEntry1] : 68 y/o F with h/o ICM, CAD s/p CABG, chronic systolic HF, HD, ESRD on HD coming for follow up. She can walk around the house without significant difficulty.

## 2020-10-01 NOTE — ASU DISCHARGE PLAN (ADULT/PEDIATRIC) - POST OP PHONE #
Patient called and is out of her Adderall.  She knows the 20mg script is at the pharmacy waiting for her but Niki was suppose to increase it to 30mg.  Please call patient at [phone].   [phone]

## 2020-10-01 NOTE — ASU PATIENT PROFILE, ADULT - PMH
Congestive heart failure (CHF)    Diabetes    Female bladder prolapse    Pleural effusion due to congestive heart failure

## 2020-10-01 NOTE — ASU DISCHARGE PLAN (ADULT/PEDIATRIC) - ASU DC SPECIAL INSTRUCTIONSFT
Please follow up as scheduled with Dr. Mota, if no appointment has been made please call to schedule follow up for 1 week.    Please take pain medication as discussed with Dr. Mota at preoperative visit, medication has been sent to the pharmacy    Please leave dressing in place will be removed by Dr. Mota at follow up visit

## 2020-10-01 NOTE — BRIEF OPERATIVE NOTE - OPERATION/FINDINGS
Small caliber vessels in forearm unsuitable for anastomosis, performed baloon dilation of distal cephalic vein, perfroemd transpostion with brachiocelphalic anastomosis

## 2020-10-01 NOTE — ASU DISCHARGE PLAN (ADULT/PEDIATRIC) - CALL YOUR DOCTOR IF YOU HAVE ANY OF THE FOLLOWING:
Swelling that gets worse/Pain not relieved by Medications/Fever greater than (need to indicate Fahrenheit or Celsius)/Bleeding that does not stop/Nausea and vomiting that does not stop/Numbness, tingling, color or temperature change to extremity/Wound/Surgical Site with redness, or foul smelling discharge or pus

## 2020-10-01 NOTE — ASU DISCHARGE PLAN (ADULT/PEDIATRIC) - CARE PROVIDER_API CALL
Aspen Mota  SURGERY  [address] [place] [address]  Phone: [phone]  Fax: [phone]  Established Patient  Follow Up Time:

## 2020-10-09 NOTE — PHYSICAL EXAM
[Chaperone Present] : A chaperone was present in the examining room during all aspects of the physical examination [FreeTextEntry1] : Urinary retention-\par Patient placed into lithotomy position. 14 F montejo removed without difficulty with 100cc of clear urine drained from her bag.\par \par 14 Fr mnotejo catheter placed without difficulty using sterile technique for a return of 0 cc of clear yellow urine. Instilled 10 cc of sterile water into the balloon. Leg bag attached without difficulty. Precautions provided.\par \par Patient will return in 1 month for her next montejo change.\par  [No Acute Distress] : in no acute distress [Well developed] : well developed [Well Nourished] : ~L well nourished

## 2020-10-09 NOTE — DISCUSSION/SUMMARY
[FreeTextEntry1] : Incomplete bladder emptying\par Montejo 14 F Changed.\par Follow up in 1 month for monteoj change, and possible surgical counseling.

## 2020-10-09 NOTE — HISTORY OF PRESENT ILLNESS
[FreeTextEntry1] : Pt is here for 1 month follow up for montejo change. \par Last seen 8/28/2020 for montejo change. \par \par RN\par \par H/o DM\par H/o MI\par H/o neuropathy, on Neurontin\par Currently pt is on Dialysis 3x a week.\par \par s/p CABG\par \par Failed pessary fitting \par Estrace cream for atrophy\par s/p Baclofen (hallucinations)\par s/p Flomax 0.4 mg (ANNA)\par \par Patient states she is doing well and states she continues to wish to move forward with uterovaginal prolapse surgery. \par

## 2020-10-09 NOTE — COUNSELING
[FreeTextEntry1] : Please call our office if you should experience a fever greater than 100.4 \par \par Please return to the office in 1 month for Castellon change and surgical counseling with Dr Rodriguez. \par \par

## 2020-11-13 NOTE — DISCUSSION/SUMMARY
[FreeTextEntry1] : Incomplete bladder emptying\par Montejo 16 F Changed.\par Follow up in 1 month for montejo change, and possible surgical counseling. \par \par Dr Rodriguez will call pt to discuss the surgeries. Lefort vs robotic hysterectomy.

## 2020-11-13 NOTE — HISTORY OF PRESENT ILLNESS
[FreeTextEntry1] : Pt is here for 1 month follow up for montejo change. \par Last seen 10/9/2020 for montejo change. \par \par RN\par \par H/o DM\par H/o MI\par H/o neuropathy, on Neurontin\par Currently pt is on Dialysis 3x a week.\par \par s/p CABG\par \par Failed pessary fitting \par Estrace cream for atrophy\par s/p Baclofen (hallucinations)\par s/p Flomax 0.4 mg (ANNA)\par \par Today pt states that she had fistula placed recently for Dialysis. \par Patient states she is doing ok and states she continues to wish to move forward with uterovaginal prolapse surgery. States that she can obtain medical clearance but would like to discuss which type of surgery she should have. \par

## 2020-11-13 NOTE — PHYSICAL EXAM
[Chaperone Present] : A chaperone was present in the examining room during all aspects of the physical examination [FreeTextEntry1] : Urinary retention-\par + strong odor in the room\par \par Patient placed into lithotomy position. 14 F montejo removed without difficulty with 50cc of clear urine drained from her bag.\par \par 16 Fr montejo catheter placed without difficulty using sterile technique for a return of 10 cc of clear yellow urine. Instilled 10 cc of sterile water into the balloon. Leg bag attached without difficulty. Precautions provided.\par \par Patient will return in 1 month for her next montejo change.\par

## 2020-11-27 NOTE — HISTORY OF PRESENT ILLNESS
[FreeTextEntry1] : 70 y/o F with h/o DM, CAD s/p CABG+ NUBIA clipping, ICM, chronic systolic HF, CKD on HD coming for follow up. Patient feels stronger. She is able to do her chores at home without much difficulty. She only feels tired on HD days. No chest pain, LOC reported. \par

## 2020-11-27 NOTE — ASSESSMENT
[FreeTextEntry1] : 68 y/o F with h/o ICM, CAD s/p CABG, chronic systolic HF, HD, ESRD on HD coming for follow up.

## 2020-12-15 PROBLEM — R33.9 INCOMPLETE BLADDER EMPTYING: Status: ACTIVE | Noted: 2019-09-13

## 2020-12-15 NOTE — PHYSICAL EXAM
[Chaperone Present] : A chaperone was present in the examining room during all aspects of the physical examination [FreeTextEntry1] : \par Patient placed into lithotomy position. \par \par 16 Fr montejo catheter placed without difficulty using sterile technique for a return of 60 cc of clear yellow urine. Instilled 10 cc of sterile water into the balloon. Leg bag attached without difficulty. Precautions provided.\par \par  [No Acute Distress] : in no acute distress [Well developed] : well developed [Well Nourished] : ~L well nourished

## 2020-12-15 NOTE — HISTORY OF PRESENT ILLNESS
[FreeTextEntry1] : Pt is here because her montejo came out this morning. \par Last seen 11/13/2020 for montejo change. \par \par RN\par \par H/o DM\par H/o MI\par H/o neuropathy, on Neurontin\par Currently pt is on Dialysis 3x a week.\par \par s/p CABG\par \par Failed pessary fitting \par Estrace cream for atrophy\par s/p Baclofen (hallucinations)\par s/p Flomax 0.4 mg (ANNA)\par s/p fistula placement for Dialysis. \par \par Today pt states that she's doing well, she believes that she pulled on her pants and that's how the montejo came out. Pt is interested in discussing surgery with Dr Rodriguez. \par

## 2020-12-15 NOTE — DISCUSSION/SUMMARY
[FreeTextEntry1] : Incomplete bladder emptying\par Montejo 16 F placed\par Follow up in 1 month for montejo change.\par Dr Rodriguez will call pt to discuss the surgeries. Lefort vs robotic hysterectomy. \par

## 2021-01-01 ENCOUNTER — NON-APPOINTMENT (OUTPATIENT)
Age: 71
End: 2021-01-01

## 2021-01-01 ENCOUNTER — APPOINTMENT (OUTPATIENT)
Dept: CARDIOLOGY | Facility: CLINIC | Age: 71
End: 2021-01-01

## 2021-01-01 ENCOUNTER — EMERGENCY (EMERGENCY)
Facility: HOSPITAL | Age: 71
LOS: 0 days | Discharge: HOME | End: 2021-02-02
Attending: EMERGENCY MEDICINE | Admitting: EMERGENCY MEDICINE
Payer: MEDICARE

## 2021-01-01 ENCOUNTER — TRANSCRIPTION ENCOUNTER (OUTPATIENT)
Age: 71
End: 2021-01-01

## 2021-01-01 ENCOUNTER — APPOINTMENT (OUTPATIENT)
Dept: UROGYNECOLOGY | Facility: CLINIC | Age: 71
End: 2021-01-01

## 2021-01-01 ENCOUNTER — APPOINTMENT (OUTPATIENT)
Dept: ANTEPARTUM | Facility: CLINIC | Age: 71
End: 2021-01-01

## 2021-01-01 ENCOUNTER — APPOINTMENT (OUTPATIENT)
Dept: UROLOGY | Facility: CLINIC | Age: 71
End: 2021-01-01

## 2021-01-01 ENCOUNTER — APPOINTMENT (OUTPATIENT)
Dept: CARDIOLOGY | Facility: CLINIC | Age: 71
End: 2021-01-01
Payer: MEDICARE

## 2021-01-01 ENCOUNTER — INPATIENT (INPATIENT)
Facility: HOSPITAL | Age: 71
LOS: 6 days | Discharge: HOME HEALTH | End: 2021-03-09
Attending: SURGERY | Admitting: SURGERY
Payer: MEDICARE

## 2021-01-01 ENCOUNTER — INPATIENT (INPATIENT)
Facility: HOSPITAL | Age: 71
LOS: 26 days | Discharge: NURSING, REHAB OR LONG-TERM CARE | End: 2021-04-28
Attending: INTERNAL MEDICINE | Admitting: INTERNAL MEDICINE
Payer: MEDICARE

## 2021-01-01 VITALS
DIASTOLIC BLOOD PRESSURE: 56 MMHG | HEART RATE: 67 BPM | OXYGEN SATURATION: 97 % | HEIGHT: 62 IN | SYSTOLIC BLOOD PRESSURE: 143 MMHG | TEMPERATURE: 96 F | WEIGHT: 121.92 LBS | RESPIRATION RATE: 16 BRPM

## 2021-01-01 VITALS — OXYGEN SATURATION: 100 % | DIASTOLIC BLOOD PRESSURE: 63 MMHG | SYSTOLIC BLOOD PRESSURE: 105 MMHG | HEART RATE: 108 BPM

## 2021-01-01 VITALS
HEIGHT: 63 IN | TEMPERATURE: 96 F | DIASTOLIC BLOOD PRESSURE: 55 MMHG | HEART RATE: 70 BPM | SYSTOLIC BLOOD PRESSURE: 96 MMHG | RESPIRATION RATE: 20 BRPM

## 2021-01-01 VITALS
SYSTOLIC BLOOD PRESSURE: 100 MMHG | WEIGHT: 123.02 LBS | OXYGEN SATURATION: 96 % | TEMPERATURE: 98 F | HEIGHT: 62 IN | DIASTOLIC BLOOD PRESSURE: 61 MMHG | HEART RATE: 100 BPM | RESPIRATION RATE: 20 BRPM

## 2021-01-01 VITALS — DIASTOLIC BLOOD PRESSURE: 74 MMHG | SYSTOLIC BLOOD PRESSURE: 144 MMHG | RESPIRATION RATE: 18 BRPM | HEART RATE: 72 BPM

## 2021-01-01 DIAGNOSIS — T83.031A LEAKAGE OF INDWELLING URETHRAL CATHETER, INITIAL ENCOUNTER: ICD-10-CM

## 2021-01-01 DIAGNOSIS — K61.1 RECTAL ABSCESS: ICD-10-CM

## 2021-01-01 DIAGNOSIS — N18.6 END STAGE RENAL DISEASE: ICD-10-CM

## 2021-01-01 DIAGNOSIS — D64.9 ANEMIA, UNSPECIFIED: ICD-10-CM

## 2021-01-01 DIAGNOSIS — S42.291A OTHER DISPLACED FRACTURE OF UPPER END OF RIGHT HUMERUS, INITIAL ENCOUNTER FOR CLOSED FRACTURE: ICD-10-CM

## 2021-01-01 DIAGNOSIS — E11.22 TYPE 2 DIABETES MELLITUS WITH DIABETIC CHRONIC KIDNEY DISEASE: ICD-10-CM

## 2021-01-01 DIAGNOSIS — Z98.890 OTHER SPECIFIED POSTPROCEDURAL STATES: Chronic | ICD-10-CM

## 2021-01-01 DIAGNOSIS — Z95.1 PRESENCE OF AORTOCORONARY BYPASS GRAFT: ICD-10-CM

## 2021-01-01 DIAGNOSIS — N81.3 COMPLETE UTEROVAGINAL PROLAPSE: ICD-10-CM

## 2021-01-01 DIAGNOSIS — I25.10 ATHEROSCLEROTIC HEART DISEASE OF NATIVE CORONARY ARTERY W/OUT ANGINA PECTORIS: ICD-10-CM

## 2021-01-01 DIAGNOSIS — S72.141A DISPLACED INTERTROCHANTERIC FRACTURE OF RIGHT FEMUR, INITIAL ENCOUNTER FOR CLOSED FRACTURE: ICD-10-CM

## 2021-01-01 DIAGNOSIS — R18.8 OTHER ASCITES: ICD-10-CM

## 2021-01-01 DIAGNOSIS — Z97.8 PRESENCE OF OTHER SPECIFIED DEVICES: Chronic | ICD-10-CM

## 2021-01-01 DIAGNOSIS — I50.23 ACUTE ON CHRONIC SYSTOLIC (CONGESTIVE) HEART FAILURE: ICD-10-CM

## 2021-01-01 DIAGNOSIS — Z99.2 DEPENDENCE ON RENAL DIALYSIS: ICD-10-CM

## 2021-01-01 DIAGNOSIS — Z87.42 PERSONAL HISTORY OF OTHER DISEASES OF THE FEMALE GENITAL TRACT: ICD-10-CM

## 2021-01-01 DIAGNOSIS — I25.10 ATHEROSCLEROTIC HEART DISEASE OF NATIVE CORONARY ARTERY WITHOUT ANGINA PECTORIS: ICD-10-CM

## 2021-01-01 DIAGNOSIS — Z91.14 PATIENT'S OTHER NONCOMPLIANCE WITH MEDICATION REGIMEN: ICD-10-CM

## 2021-01-01 DIAGNOSIS — R65.21 SEVERE SEPSIS WITH SEPTIC SHOCK: ICD-10-CM

## 2021-01-01 DIAGNOSIS — Z79.891 LONG TERM (CURRENT) USE OF OPIATE ANALGESIC: ICD-10-CM

## 2021-01-01 DIAGNOSIS — R55 SYNCOPE AND COLLAPSE: ICD-10-CM

## 2021-01-01 DIAGNOSIS — I25.5 ISCHEMIC CARDIOMYOPATHY: ICD-10-CM

## 2021-01-01 DIAGNOSIS — N18.9 CHRONIC KIDNEY DISEASE, UNSPECIFIED: ICD-10-CM

## 2021-01-01 DIAGNOSIS — I48.0 PAROXYSMAL ATRIAL FIBRILLATION: ICD-10-CM

## 2021-01-01 DIAGNOSIS — E11.9 TYPE 2 DIABETES MELLITUS WITHOUT COMPLICATIONS: ICD-10-CM

## 2021-01-01 DIAGNOSIS — Z86.79 OTHER SPECIFIED POSTPROCEDURAL STATES: ICD-10-CM

## 2021-01-01 DIAGNOSIS — E11.9 TYPE 2 DIABETES MELLITUS W/OUT COMPLICATIONS: ICD-10-CM

## 2021-01-01 DIAGNOSIS — R57.0 CARDIOGENIC SHOCK: ICD-10-CM
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
## 2021-01-01 DIAGNOSIS — J96.01 ACUTE RESPIRATORY FAILURE WITH HYPOXIA: ICD-10-CM

## 2021-01-01 DIAGNOSIS — Z79.4 LONG TERM (CURRENT) USE OF INSULIN: ICD-10-CM

## 2021-01-01 DIAGNOSIS — L02.31 CUTANEOUS ABSCESS OF BUTTOCK: ICD-10-CM

## 2021-01-01 DIAGNOSIS — I50.9 HEART FAILURE, UNSPECIFIED: ICD-10-CM

## 2021-01-01 DIAGNOSIS — K64.4 RESIDUAL HEMORRHOIDAL SKIN TAGS: ICD-10-CM

## 2021-01-01 DIAGNOSIS — I50.82 BIVENTRICULAR HEART FAILURE: ICD-10-CM

## 2021-01-01 DIAGNOSIS — S22.059A UNSPECIFIED FRACTURE OF T5-T6 VERTEBRA, INITIAL ENCOUNTER FOR CLOSED FRACTURE: ICD-10-CM

## 2021-01-01 DIAGNOSIS — Z95.1 PRESENCE OF AORTOCORONARY BYPASS GRAFT: Chronic | ICD-10-CM

## 2021-01-01 DIAGNOSIS — I95.9 HYPOTENSION, UNSPECIFIED: ICD-10-CM
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
## 2021-01-01 DIAGNOSIS — A41.9 SEPSIS, UNSPECIFIED ORGANISM: ICD-10-CM

## 2021-01-01 DIAGNOSIS — R32 UNSPECIFIED URINARY INCONTINENCE: ICD-10-CM

## 2021-01-01 DIAGNOSIS — I13.2 HYPERTENSIVE HEART AND CHRONIC KIDNEY DISEASE WITH HEART FAILURE AND WITH STAGE 5 CHRONIC KIDNEY DISEASE, OR END STAGE RENAL DISEASE: ICD-10-CM

## 2021-01-01 DIAGNOSIS — Z98.890 OTHER SPECIFIED POSTPROCEDURAL STATES: ICD-10-CM

## 2021-01-01 DIAGNOSIS — I24.8 OTHER FORMS OF ACUTE ISCHEMIC HEART DISEASE: ICD-10-CM

## 2021-01-01 DIAGNOSIS — E11.69 TYPE 2 DIABETES MELLITUS WITH OTHER SPECIFIED COMPLICATION: ICD-10-CM

## 2021-01-01 DIAGNOSIS — Z79.82 LONG TERM (CURRENT) USE OF ASPIRIN: ICD-10-CM

## 2021-01-01 DIAGNOSIS — N25.81 SECONDARY HYPERPARATHYROIDISM OF RENAL ORIGIN: ICD-10-CM

## 2021-01-01 DIAGNOSIS — S12.100A UNSPECIFIED DISPLACED FRACTURE OF SECOND CERVICAL VERTEBRA, INITIAL ENCOUNTER FOR CLOSED FRACTURE: ICD-10-CM

## 2021-01-01 DIAGNOSIS — Z79.899 OTHER LONG TERM (CURRENT) DRUG THERAPY: ICD-10-CM
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
## 2021-01-01 DIAGNOSIS — R56.9 UNSPECIFIED CONVULSIONS: ICD-10-CM

## 2021-01-01 DIAGNOSIS — I49.01 VENTRICULAR FIBRILLATION: ICD-10-CM

## 2021-01-01 DIAGNOSIS — J12.0 ADENOVIRAL PNEUMONIA: ICD-10-CM
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
## 2021-01-01 DIAGNOSIS — I82.622 ACUTE EMBOLISM AND THROMBOSIS OF DEEP VEINS OF LEFT UPPER EXTREMITY: ICD-10-CM

## 2021-01-01 DIAGNOSIS — S22.43XA MULTIPLE FRACTURES OF RIBS, BILATERAL, INITIAL ENCOUNTER FOR CLOSED FRACTURE: ICD-10-CM

## 2021-01-01 DIAGNOSIS — I50.22 CHRONIC SYSTOLIC (CONGESTIVE) HEART FAILURE: ICD-10-CM

## 2021-01-01 DIAGNOSIS — E83.39 OTHER DISORDERS OF PHOSPHORUS METABOLISM: ICD-10-CM

## 2021-01-01 DIAGNOSIS — R33.9 RETENTION OF URINE, UNSPECIFIED: ICD-10-CM

## 2021-01-01 DIAGNOSIS — I46.2 CARDIAC ARREST DUE TO UNDERLYING CARDIAC CONDITION: ICD-10-CM

## 2021-01-01 LAB
A1C WITH ESTIMATED AVERAGE GLUCOSE RESULT: 5.1 % — SIGNIFICANT CHANGE UP (ref 4–5.6)
ALBUMIN SERPL ELPH-MCNC: 2.3 G/DL — LOW (ref 3.5–5.2)
ALBUMIN SERPL ELPH-MCNC: 2.3 G/DL — LOW (ref 3.5–5.2)
ALBUMIN SERPL ELPH-MCNC: 2.4 G/DL — LOW (ref 3.5–5.2)
ALBUMIN SERPL ELPH-MCNC: 2.4 G/DL — LOW (ref 3.5–5.2)
ALBUMIN SERPL ELPH-MCNC: 2.5 G/DL — LOW (ref 3.5–5.2)
ALBUMIN SERPL ELPH-MCNC: 2.6 G/DL — LOW (ref 3.5–5.2)
ALBUMIN SERPL ELPH-MCNC: 2.7 G/DL — LOW (ref 3.5–5.2)
ALBUMIN SERPL ELPH-MCNC: 2.8 G/DL — LOW (ref 3.5–5.2)
ALBUMIN SERPL ELPH-MCNC: 2.9 G/DL — LOW (ref 3.5–5.2)
ALBUMIN SERPL ELPH-MCNC: 3 G/DL — LOW (ref 3.5–5.2)
ALBUMIN SERPL ELPH-MCNC: 3.1 G/DL — LOW (ref 3.5–5.2)
ALBUMIN SERPL ELPH-MCNC: 3.2 G/DL — LOW (ref 3.5–5.2)
ALBUMIN SERPL ELPH-MCNC: 3.3 G/DL — LOW (ref 3.5–5.2)
ALBUMIN SERPL ELPH-MCNC: 3.3 G/DL — LOW (ref 3.5–5.2)
ALBUMIN SERPL ELPH-MCNC: 3.5 G/DL — SIGNIFICANT CHANGE UP (ref 3.5–5.2)
ALBUMIN SERPL ELPH-MCNC: 3.7 G/DL — SIGNIFICANT CHANGE UP (ref 3.5–5.2)
ALBUMIN SERPL ELPH-MCNC: 3.7 G/DL — SIGNIFICANT CHANGE UP (ref 3.5–5.2)
ALBUMIN SERPL ELPH-MCNC: 4.2 G/DL — SIGNIFICANT CHANGE UP (ref 3.5–5.2)
ALP SERPL-CCNC: 112 U/L — SIGNIFICANT CHANGE UP (ref 30–115)
ALP SERPL-CCNC: 115 U/L — SIGNIFICANT CHANGE UP (ref 30–115)
ALP SERPL-CCNC: 121 U/L — HIGH (ref 30–115)
ALP SERPL-CCNC: 124 U/L — HIGH (ref 30–115)
ALP SERPL-CCNC: 129 U/L — HIGH (ref 30–115)
ALP SERPL-CCNC: 133 U/L — HIGH (ref 30–115)
ALP SERPL-CCNC: 135 U/L — HIGH (ref 30–115)
ALP SERPL-CCNC: 135 U/L — HIGH (ref 30–115)
ALP SERPL-CCNC: 138 U/L — HIGH (ref 30–115)
ALP SERPL-CCNC: 139 U/L — HIGH (ref 30–115)
ALP SERPL-CCNC: 141 U/L — HIGH (ref 30–115)
ALP SERPL-CCNC: 143 U/L — HIGH (ref 30–115)
ALP SERPL-CCNC: 144 U/L — HIGH (ref 30–115)
ALP SERPL-CCNC: 146 U/L — HIGH (ref 30–115)
ALP SERPL-CCNC: 148 U/L — HIGH (ref 30–115)
ALP SERPL-CCNC: 149 U/L — HIGH (ref 30–115)
ALP SERPL-CCNC: 150 U/L — HIGH (ref 30–115)
ALP SERPL-CCNC: 151 U/L — HIGH (ref 30–115)
ALP SERPL-CCNC: 154 U/L — HIGH (ref 30–115)
ALP SERPL-CCNC: 159 U/L — HIGH (ref 30–115)
ALP SERPL-CCNC: 163 U/L — HIGH (ref 30–115)
ALP SERPL-CCNC: 164 U/L — HIGH (ref 30–115)
ALP SERPL-CCNC: 167 U/L — HIGH (ref 30–115)
ALP SERPL-CCNC: 180 U/L — HIGH (ref 30–115)
ALP SERPL-CCNC: 188 U/L — HIGH (ref 30–115)
ALP SERPL-CCNC: 190 U/L — HIGH (ref 30–115)
ALP SERPL-CCNC: 193 U/L — HIGH (ref 30–115)
ALP SERPL-CCNC: 193 U/L — HIGH (ref 30–115)
ALP SERPL-CCNC: 196 U/L — HIGH (ref 30–115)
ALP SERPL-CCNC: 92 U/L — SIGNIFICANT CHANGE UP (ref 30–115)
ALT FLD-CCNC: 10 U/L — SIGNIFICANT CHANGE UP (ref 0–41)
ALT FLD-CCNC: 10 U/L — SIGNIFICANT CHANGE UP (ref 0–41)
ALT FLD-CCNC: 13 U/L — SIGNIFICANT CHANGE UP (ref 0–41)
ALT FLD-CCNC: 17 U/L — SIGNIFICANT CHANGE UP (ref 0–41)
ALT FLD-CCNC: 24 U/L — SIGNIFICANT CHANGE UP (ref 0–41)
ALT FLD-CCNC: 32 U/L — SIGNIFICANT CHANGE UP (ref 0–41)
ALT FLD-CCNC: 5 U/L — SIGNIFICANT CHANGE UP (ref 0–41)
ALT FLD-CCNC: 53 U/L — HIGH (ref 0–41)
ALT FLD-CCNC: 6 U/L — SIGNIFICANT CHANGE UP (ref 0–41)
ALT FLD-CCNC: 7 U/L — SIGNIFICANT CHANGE UP (ref 0–41)
ALT FLD-CCNC: 9 U/L — SIGNIFICANT CHANGE UP (ref 0–41)
ALT FLD-CCNC: <5 U/L — SIGNIFICANT CHANGE UP (ref 0–41)
ANION GAP SERPL CALC-SCNC: 10 MMOL/L — SIGNIFICANT CHANGE UP (ref 7–14)
ANION GAP SERPL CALC-SCNC: 11 MMOL/L — SIGNIFICANT CHANGE UP (ref 7–14)
ANION GAP SERPL CALC-SCNC: 12 MMOL/L — SIGNIFICANT CHANGE UP (ref 7–14)
ANION GAP SERPL CALC-SCNC: 13 MMOL/L — SIGNIFICANT CHANGE UP (ref 7–14)
ANION GAP SERPL CALC-SCNC: 14 MMOL/L — SIGNIFICANT CHANGE UP (ref 7–14)
ANION GAP SERPL CALC-SCNC: 15 MMOL/L — HIGH (ref 7–14)
ANION GAP SERPL CALC-SCNC: 16 MMOL/L — HIGH (ref 7–14)
ANION GAP SERPL CALC-SCNC: 17 MMOL/L — HIGH (ref 7–14)
ANION GAP SERPL CALC-SCNC: 17 MMOL/L — HIGH (ref 7–14)
ANION GAP SERPL CALC-SCNC: 21 MMOL/L — HIGH (ref 7–14)
ANISOCYTOSIS BLD QL: SLIGHT — SIGNIFICANT CHANGE UP
APPEARANCE UR: ABNORMAL
APPEARANCE UR: CLEAR — SIGNIFICANT CHANGE UP
APTT BLD: 104 SEC — CRITICAL HIGH (ref 27–39.2)
APTT BLD: 132.5 SEC — CRITICAL HIGH (ref 27–39.2)
APTT BLD: 26.2 SEC — LOW (ref 27–39.2)
APTT BLD: 26.6 SEC — LOW (ref 27–39.2)
APTT BLD: 27.1 SEC — SIGNIFICANT CHANGE UP (ref 27–39.2)
APTT BLD: 28.3 SEC — SIGNIFICANT CHANGE UP (ref 27–39.2)
APTT BLD: 29 SEC — SIGNIFICANT CHANGE UP (ref 27–39.2)
APTT BLD: 29.3 SEC — SIGNIFICANT CHANGE UP (ref 27–39.2)
APTT BLD: 29.9 SEC — SIGNIFICANT CHANGE UP (ref 27–39.2)
APTT BLD: 30.2 SEC — SIGNIFICANT CHANGE UP (ref 27–39.2)
APTT BLD: 30.6 SEC — SIGNIFICANT CHANGE UP (ref 27–39.2)
APTT BLD: 31.2 SEC — SIGNIFICANT CHANGE UP (ref 27–39.2)
APTT BLD: 31.6 SEC — SIGNIFICANT CHANGE UP (ref 27–39.2)
APTT BLD: 31.7 SEC — SIGNIFICANT CHANGE UP (ref 27–39.2)
APTT BLD: 31.8 SEC — SIGNIFICANT CHANGE UP (ref 27–39.2)
APTT BLD: 32.1 SEC — SIGNIFICANT CHANGE UP (ref 27–39.2)
APTT BLD: 32.6 SEC — SIGNIFICANT CHANGE UP (ref 27–39.2)
APTT BLD: 32.6 SEC — SIGNIFICANT CHANGE UP (ref 27–39.2)
APTT BLD: 32.8 SEC — SIGNIFICANT CHANGE UP (ref 27–39.2)
APTT BLD: 32.8 SEC — SIGNIFICANT CHANGE UP (ref 27–39.2)
APTT BLD: 33.6 SEC — SIGNIFICANT CHANGE UP (ref 27–39.2)
APTT BLD: 34.8 SEC — SIGNIFICANT CHANGE UP (ref 27–39.2)
APTT BLD: 35.5 SEC — SIGNIFICANT CHANGE UP (ref 27–39.2)
APTT BLD: 36.1 SEC — SIGNIFICANT CHANGE UP (ref 27–39.2)
APTT BLD: 38.7 SEC — SIGNIFICANT CHANGE UP (ref 27–39.2)
APTT BLD: 44.6 SEC — HIGH (ref 27–39.2)
APTT BLD: 46.3 SEC — HIGH (ref 27–39.2)
APTT BLD: 46.8 SEC — HIGH (ref 27–39.2)
APTT BLD: 47.6 SEC — HIGH (ref 27–39.2)
APTT BLD: 48 SEC — HIGH (ref 27–39.2)
APTT BLD: 49.3 SEC — HIGH (ref 27–39.2)
APTT BLD: 50.9 SEC — HIGH (ref 27–39.2)
APTT BLD: 55.6 SEC — HIGH (ref 27–39.2)
APTT BLD: 60.1 SEC — HIGH (ref 27–39.2)
APTT BLD: 60.2 SEC — HIGH (ref 27–39.2)
APTT BLD: 61.2 SEC — HIGH (ref 27–39.2)
APTT BLD: 61.7 SEC — HIGH (ref 27–39.2)
APTT BLD: 62.8 SEC — HIGH (ref 27–39.2)
APTT BLD: 64.1 SEC — HIGH (ref 27–39.2)
APTT BLD: 75 SEC — CRITICAL HIGH (ref 27–39.2)
APTT BLD: 81.4 SEC — CRITICAL HIGH (ref 27–39.2)
APTT BLD: >200 SEC — CRITICAL HIGH (ref 27–39.2)
AST SERPL-CCNC: 10 U/L — SIGNIFICANT CHANGE UP (ref 0–41)
AST SERPL-CCNC: 108 U/L — HIGH (ref 0–41)
AST SERPL-CCNC: 11 U/L — SIGNIFICANT CHANGE UP (ref 0–41)
AST SERPL-CCNC: 11 U/L — SIGNIFICANT CHANGE UP (ref 0–41)
AST SERPL-CCNC: 12 U/L — SIGNIFICANT CHANGE UP (ref 0–41)
AST SERPL-CCNC: 13 U/L — SIGNIFICANT CHANGE UP (ref 0–41)
AST SERPL-CCNC: 13 U/L — SIGNIFICANT CHANGE UP (ref 0–41)
AST SERPL-CCNC: 14 U/L — SIGNIFICANT CHANGE UP (ref 0–41)
AST SERPL-CCNC: 14 U/L — SIGNIFICANT CHANGE UP (ref 0–41)
AST SERPL-CCNC: 16 U/L — SIGNIFICANT CHANGE UP (ref 0–41)
AST SERPL-CCNC: 17 U/L — SIGNIFICANT CHANGE UP (ref 0–41)
AST SERPL-CCNC: 18 U/L — SIGNIFICANT CHANGE UP (ref 0–41)
AST SERPL-CCNC: 18 U/L — SIGNIFICANT CHANGE UP (ref 0–41)
AST SERPL-CCNC: 19 U/L — SIGNIFICANT CHANGE UP (ref 0–41)
AST SERPL-CCNC: 20 U/L — SIGNIFICANT CHANGE UP (ref 0–41)
AST SERPL-CCNC: 24 U/L — SIGNIFICANT CHANGE UP (ref 0–41)
AST SERPL-CCNC: 30 U/L — SIGNIFICANT CHANGE UP (ref 0–41)
AST SERPL-CCNC: 49 U/L — HIGH (ref 0–41)
AST SERPL-CCNC: 9 U/L — SIGNIFICANT CHANGE UP (ref 0–41)
BACTERIA # UR AUTO: ABNORMAL
BACTERIA # UR AUTO: ABNORMAL
BASE EXCESS BLDA CALC-SCNC: -1.1 MMOL/L — SIGNIFICANT CHANGE UP (ref -2–2)
BASE EXCESS BLDA CALC-SCNC: -10.6 MMOL/L — LOW (ref -2–2)
BASE EXCESS BLDA CALC-SCNC: 1.6 MMOL/L — SIGNIFICANT CHANGE UP (ref -2–2)
BASE EXCESS BLDA CALC-SCNC: 2.7 MMOL/L — HIGH (ref -2–2)
BASE EXCESS BLDA CALC-SCNC: 5.8 MMOL/L — HIGH (ref -2–2)
BASE EXCESS BLDA CALC-SCNC: 7.2 MMOL/L — HIGH (ref -2–2)
BASOPHILS # BLD AUTO: 0 K/UL — SIGNIFICANT CHANGE UP (ref 0–0.2)
BASOPHILS # BLD AUTO: 0 K/UL — SIGNIFICANT CHANGE UP (ref 0–0.2)
BASOPHILS # BLD AUTO: 0.01 K/UL — SIGNIFICANT CHANGE UP (ref 0–0.2)
BASOPHILS # BLD AUTO: 0.02 K/UL — SIGNIFICANT CHANGE UP (ref 0–0.2)
BASOPHILS # BLD AUTO: 0.03 K/UL — SIGNIFICANT CHANGE UP (ref 0–0.2)
BASOPHILS # BLD AUTO: 0.04 K/UL — SIGNIFICANT CHANGE UP (ref 0–0.2)
BASOPHILS # BLD AUTO: 0.05 K/UL — SIGNIFICANT CHANGE UP (ref 0–0.2)
BASOPHILS # BLD AUTO: 0.07 K/UL — SIGNIFICANT CHANGE UP (ref 0–0.2)
BASOPHILS # BLD AUTO: 0.08 K/UL — SIGNIFICANT CHANGE UP (ref 0–0.2)
BASOPHILS NFR BLD AUTO: 0 % — SIGNIFICANT CHANGE UP (ref 0–1)
BASOPHILS NFR BLD AUTO: 0 % — SIGNIFICANT CHANGE UP (ref 0–1)
BASOPHILS NFR BLD AUTO: 0.2 % — SIGNIFICANT CHANGE UP (ref 0–1)
BASOPHILS NFR BLD AUTO: 0.3 % — SIGNIFICANT CHANGE UP (ref 0–1)
BASOPHILS NFR BLD AUTO: 0.4 % — SIGNIFICANT CHANGE UP (ref 0–1)
BASOPHILS NFR BLD AUTO: 0.5 % — SIGNIFICANT CHANGE UP (ref 0–1)
BASOPHILS NFR BLD AUTO: 0.6 % — SIGNIFICANT CHANGE UP (ref 0–1)
BASOPHILS NFR BLD AUTO: 0.7 % — SIGNIFICANT CHANGE UP (ref 0–1)
BASOPHILS NFR BLD AUTO: 0.9 % — SIGNIFICANT CHANGE UP (ref 0–1)
BILIRUB SERPL-MCNC: 0.2 MG/DL — SIGNIFICANT CHANGE UP (ref 0.2–1.2)
BILIRUB SERPL-MCNC: 0.3 MG/DL — SIGNIFICANT CHANGE UP (ref 0.2–1.2)
BILIRUB SERPL-MCNC: 0.4 MG/DL — SIGNIFICANT CHANGE UP (ref 0.2–1.2)
BILIRUB SERPL-MCNC: 0.5 MG/DL — SIGNIFICANT CHANGE UP (ref 0.2–1.2)
BILIRUB UR-MCNC: NEGATIVE — SIGNIFICANT CHANGE UP
BILIRUB UR-MCNC: NEGATIVE — SIGNIFICANT CHANGE UP
BLD GP AB SCN SERPL QL: SIGNIFICANT CHANGE UP
BUN SERPL-MCNC: 23 MG/DL — HIGH (ref 10–20)
BUN SERPL-MCNC: 23 MG/DL — HIGH (ref 10–20)
BUN SERPL-MCNC: 26 MG/DL — HIGH (ref 10–20)
BUN SERPL-MCNC: 26 MG/DL — HIGH (ref 10–20)
BUN SERPL-MCNC: 27 MG/DL — HIGH (ref 10–20)
BUN SERPL-MCNC: 30 MG/DL — HIGH (ref 10–20)
BUN SERPL-MCNC: 31 MG/DL — HIGH (ref 10–20)
BUN SERPL-MCNC: 31 MG/DL — HIGH (ref 10–20)
BUN SERPL-MCNC: 32 MG/DL — HIGH (ref 10–20)
BUN SERPL-MCNC: 33 MG/DL — HIGH (ref 10–20)
BUN SERPL-MCNC: 33 MG/DL — HIGH (ref 10–20)
BUN SERPL-MCNC: 36 MG/DL — HIGH (ref 10–20)
BUN SERPL-MCNC: 38 MG/DL — HIGH (ref 10–20)
BUN SERPL-MCNC: 38 MG/DL — HIGH (ref 10–20)
BUN SERPL-MCNC: 39 MG/DL — HIGH (ref 10–20)
BUN SERPL-MCNC: 39 MG/DL — HIGH (ref 10–20)
BUN SERPL-MCNC: 40 MG/DL — HIGH (ref 10–20)
BUN SERPL-MCNC: 40 MG/DL — HIGH (ref 10–20)
BUN SERPL-MCNC: 42 MG/DL — HIGH (ref 10–20)
BUN SERPL-MCNC: 42 MG/DL — HIGH (ref 10–20)
BUN SERPL-MCNC: 43 MG/DL — HIGH (ref 10–20)
BUN SERPL-MCNC: 43 MG/DL — HIGH (ref 10–20)
BUN SERPL-MCNC: 45 MG/DL — HIGH (ref 10–20)
BUN SERPL-MCNC: 46 MG/DL — HIGH (ref 10–20)
BUN SERPL-MCNC: 49 MG/DL — HIGH (ref 10–20)
BUN SERPL-MCNC: 51 MG/DL — HIGH (ref 10–20)
BUN SERPL-MCNC: 51 MG/DL — HIGH (ref 10–20)
BUN SERPL-MCNC: 56 MG/DL — HIGH (ref 10–20)
BUN SERPL-MCNC: 62 MG/DL — CRITICAL HIGH (ref 10–20)
BUN SERPL-MCNC: 64 MG/DL — CRITICAL HIGH (ref 10–20)
BUN SERPL-MCNC: 82 MG/DL — CRITICAL HIGH (ref 10–20)
BURR CELLS BLD QL SMEAR: PRESENT — SIGNIFICANT CHANGE UP
CALCIUM SERPL-MCNC: 10.9 MG/DL — HIGH (ref 8.5–10.1)
CALCIUM SERPL-MCNC: 8 MG/DL — LOW (ref 8.5–10.1)
CALCIUM SERPL-MCNC: 8 MG/DL — LOW (ref 8.5–10.1)
CALCIUM SERPL-MCNC: 8.1 MG/DL — LOW (ref 8.5–10.1)
CALCIUM SERPL-MCNC: 8.2 MG/DL — LOW (ref 8.5–10.1)
CALCIUM SERPL-MCNC: 8.2 MG/DL — LOW (ref 8.5–10.1)
CALCIUM SERPL-MCNC: 8.3 MG/DL — LOW (ref 8.5–10.1)
CALCIUM SERPL-MCNC: 8.4 MG/DL — LOW (ref 8.5–10.1)
CALCIUM SERPL-MCNC: 8.5 MG/DL — SIGNIFICANT CHANGE UP (ref 8.5–10.1)
CALCIUM SERPL-MCNC: 8.6 MG/DL — SIGNIFICANT CHANGE UP (ref 8.5–10.1)
CALCIUM SERPL-MCNC: 8.7 MG/DL — SIGNIFICANT CHANGE UP (ref 8.5–10.1)
CALCIUM SERPL-MCNC: 8.8 MG/DL — SIGNIFICANT CHANGE UP (ref 8.5–10.1)
CALCIUM SERPL-MCNC: 8.8 MG/DL — SIGNIFICANT CHANGE UP (ref 8.5–10.1)
CALCIUM SERPL-MCNC: 8.9 MG/DL — SIGNIFICANT CHANGE UP (ref 8.5–10.1)
CALCIUM SERPL-MCNC: 9 MG/DL — SIGNIFICANT CHANGE UP (ref 8.5–10.1)
CALCIUM SERPL-MCNC: 9 MG/DL — SIGNIFICANT CHANGE UP (ref 8.5–10.1)
CALCIUM SERPL-MCNC: 9.1 MG/DL — SIGNIFICANT CHANGE UP (ref 8.5–10.1)
CALCIUM SERPL-MCNC: 9.2 MG/DL — SIGNIFICANT CHANGE UP (ref 8.5–10.1)
CHLORIDE SERPL-SCNC: 100 MMOL/L — SIGNIFICANT CHANGE UP (ref 98–110)
CHLORIDE SERPL-SCNC: 101 MMOL/L — SIGNIFICANT CHANGE UP (ref 98–110)
CHLORIDE SERPL-SCNC: 102 MMOL/L — SIGNIFICANT CHANGE UP (ref 98–110)
CHLORIDE SERPL-SCNC: 102 MMOL/L — SIGNIFICANT CHANGE UP (ref 98–110)
CHLORIDE SERPL-SCNC: 103 MMOL/L — SIGNIFICANT CHANGE UP (ref 98–110)
CHLORIDE SERPL-SCNC: 104 MMOL/L — SIGNIFICANT CHANGE UP (ref 98–110)
CHLORIDE SERPL-SCNC: 105 MMOL/L — SIGNIFICANT CHANGE UP (ref 98–110)
CHLORIDE SERPL-SCNC: 105 MMOL/L — SIGNIFICANT CHANGE UP (ref 98–110)
CHLORIDE SERPL-SCNC: 106 MMOL/L — SIGNIFICANT CHANGE UP (ref 98–110)
CHLORIDE SERPL-SCNC: 107 MMOL/L — SIGNIFICANT CHANGE UP (ref 98–110)
CHLORIDE SERPL-SCNC: 108 MMOL/L — SIGNIFICANT CHANGE UP (ref 98–110)
CHLORIDE SERPL-SCNC: 95 MMOL/L — LOW (ref 98–110)
CHLORIDE SERPL-SCNC: 97 MMOL/L — LOW (ref 98–110)
CHLORIDE SERPL-SCNC: 98 MMOL/L — SIGNIFICANT CHANGE UP (ref 98–110)
CHLORIDE SERPL-SCNC: 99 MMOL/L — SIGNIFICANT CHANGE UP (ref 98–110)
CK SERPL-CCNC: 34 U/L — SIGNIFICANT CHANGE UP (ref 0–225)
CO2 SERPL-SCNC: 16 MMOL/L — LOW (ref 17–32)
CO2 SERPL-SCNC: 17 MMOL/L — SIGNIFICANT CHANGE UP (ref 17–32)
CO2 SERPL-SCNC: 18 MMOL/L — SIGNIFICANT CHANGE UP (ref 17–32)
CO2 SERPL-SCNC: 20 MMOL/L — SIGNIFICANT CHANGE UP (ref 17–32)
CO2 SERPL-SCNC: 22 MMOL/L — SIGNIFICANT CHANGE UP (ref 17–32)
CO2 SERPL-SCNC: 22 MMOL/L — SIGNIFICANT CHANGE UP (ref 17–32)
CO2 SERPL-SCNC: 23 MMOL/L — SIGNIFICANT CHANGE UP (ref 17–32)
CO2 SERPL-SCNC: 24 MMOL/L — SIGNIFICANT CHANGE UP (ref 17–32)
CO2 SERPL-SCNC: 25 MMOL/L — SIGNIFICANT CHANGE UP (ref 17–32)
CO2 SERPL-SCNC: 26 MMOL/L — SIGNIFICANT CHANGE UP (ref 17–32)
CO2 SERPL-SCNC: 27 MMOL/L — SIGNIFICANT CHANGE UP (ref 17–32)
CO2 SERPL-SCNC: 28 MMOL/L — SIGNIFICANT CHANGE UP (ref 17–32)
CO2 SERPL-SCNC: 28 MMOL/L — SIGNIFICANT CHANGE UP (ref 17–32)
CO2 SERPL-SCNC: 29 MMOL/L — SIGNIFICANT CHANGE UP (ref 17–32)
CO2 SERPL-SCNC: 29 MMOL/L — SIGNIFICANT CHANGE UP (ref 17–32)
CO2 SERPL-SCNC: 30 MMOL/L — SIGNIFICANT CHANGE UP (ref 17–32)
COLOR SPEC: SIGNIFICANT CHANGE UP
COLOR SPEC: YELLOW — SIGNIFICANT CHANGE UP
COVID-19 SPIKE DOMAIN AB INTERP: POSITIVE
COVID-19 SPIKE DOMAIN ANTIBODY RESULT: 107 AU/ML — HIGH
CREAT SERPL-MCNC: 2.5 MG/DL — HIGH (ref 0.7–1.5)
CREAT SERPL-MCNC: 2.6 MG/DL — HIGH (ref 0.7–1.5)
CREAT SERPL-MCNC: 2.7 MG/DL — HIGH (ref 0.7–1.5)
CREAT SERPL-MCNC: 2.8 MG/DL — HIGH (ref 0.7–1.5)
CREAT SERPL-MCNC: 2.8 MG/DL — HIGH (ref 0.7–1.5)
CREAT SERPL-MCNC: 2.9 MG/DL — HIGH (ref 0.7–1.5)
CREAT SERPL-MCNC: 3 MG/DL — HIGH (ref 0.7–1.5)
CREAT SERPL-MCNC: 3.1 MG/DL — HIGH (ref 0.7–1.5)
CREAT SERPL-MCNC: 3.2 MG/DL — HIGH (ref 0.7–1.5)
CREAT SERPL-MCNC: 3.2 MG/DL — HIGH (ref 0.7–1.5)
CREAT SERPL-MCNC: 3.3 MG/DL — HIGH (ref 0.7–1.5)
CREAT SERPL-MCNC: 3.4 MG/DL — HIGH (ref 0.7–1.5)
CREAT SERPL-MCNC: 3.4 MG/DL — HIGH (ref 0.7–1.5)
CREAT SERPL-MCNC: 3.5 MG/DL — HIGH (ref 0.7–1.5)
CREAT SERPL-MCNC: 3.5 MG/DL — HIGH (ref 0.7–1.5)
CREAT SERPL-MCNC: 3.6 MG/DL — HIGH (ref 0.7–1.5)
CREAT SERPL-MCNC: 3.6 MG/DL — HIGH (ref 0.7–1.5)
CREAT SERPL-MCNC: 3.7 MG/DL — HIGH (ref 0.7–1.5)
CREAT SERPL-MCNC: 3.8 MG/DL — HIGH (ref 0.7–1.5)
CREAT SERPL-MCNC: 4.1 MG/DL — CRITICAL HIGH (ref 0.7–1.5)
CREAT SERPL-MCNC: 4.2 MG/DL — CRITICAL HIGH (ref 0.7–1.5)
CREAT SERPL-MCNC: 4.2 MG/DL — CRITICAL HIGH (ref 0.7–1.5)
CREAT SERPL-MCNC: 4.6 MG/DL — CRITICAL HIGH (ref 0.7–1.5)
CREAT SERPL-MCNC: 4.9 MG/DL — CRITICAL HIGH (ref 0.7–1.5)
CREAT SERPL-MCNC: 5.4 MG/DL — CRITICAL HIGH (ref 0.7–1.5)
CREAT SERPL-MCNC: 6.3 MG/DL — CRITICAL HIGH (ref 0.7–1.5)
CULTURE RESULTS: SIGNIFICANT CHANGE UP
DIFF PNL FLD: ABNORMAL
DIFF PNL FLD: NEGATIVE — SIGNIFICANT CHANGE UP
EOSINOPHIL # BLD AUTO: 0 K/UL — SIGNIFICANT CHANGE UP (ref 0–0.7)
EOSINOPHIL # BLD AUTO: 0.02 K/UL — SIGNIFICANT CHANGE UP (ref 0–0.7)
EOSINOPHIL # BLD AUTO: 0.06 K/UL — SIGNIFICANT CHANGE UP (ref 0–0.7)
EOSINOPHIL # BLD AUTO: 0.07 K/UL — SIGNIFICANT CHANGE UP (ref 0–0.7)
EOSINOPHIL # BLD AUTO: 0.08 K/UL — SIGNIFICANT CHANGE UP (ref 0–0.7)
EOSINOPHIL # BLD AUTO: 0.1 K/UL — SIGNIFICANT CHANGE UP (ref 0–0.7)
EOSINOPHIL # BLD AUTO: 0.11 K/UL — SIGNIFICANT CHANGE UP (ref 0–0.7)
EOSINOPHIL # BLD AUTO: 0.12 K/UL — SIGNIFICANT CHANGE UP (ref 0–0.7)
EOSINOPHIL # BLD AUTO: 0.13 K/UL — SIGNIFICANT CHANGE UP (ref 0–0.7)
EOSINOPHIL # BLD AUTO: 0.14 K/UL — SIGNIFICANT CHANGE UP (ref 0–0.7)
EOSINOPHIL # BLD AUTO: 0.15 K/UL — SIGNIFICANT CHANGE UP (ref 0–0.7)
EOSINOPHIL # BLD AUTO: 0.16 K/UL — SIGNIFICANT CHANGE UP (ref 0–0.7)
EOSINOPHIL # BLD AUTO: 0.16 K/UL — SIGNIFICANT CHANGE UP (ref 0–0.7)
EOSINOPHIL # BLD AUTO: 0.17 K/UL — SIGNIFICANT CHANGE UP (ref 0–0.7)
EOSINOPHIL # BLD AUTO: 0.18 K/UL — SIGNIFICANT CHANGE UP (ref 0–0.7)
EOSINOPHIL # BLD AUTO: 0.18 K/UL — SIGNIFICANT CHANGE UP (ref 0–0.7)
EOSINOPHIL # BLD AUTO: 0.2 K/UL — SIGNIFICANT CHANGE UP (ref 0–0.7)
EOSINOPHIL # BLD AUTO: 0.2 K/UL — SIGNIFICANT CHANGE UP (ref 0–0.7)
EOSINOPHIL # BLD AUTO: 0.21 K/UL — SIGNIFICANT CHANGE UP (ref 0–0.7)
EOSINOPHIL # BLD AUTO: 0.22 K/UL — SIGNIFICANT CHANGE UP (ref 0–0.7)
EOSINOPHIL # BLD AUTO: 0.27 K/UL — SIGNIFICANT CHANGE UP (ref 0–0.7)
EOSINOPHIL # BLD AUTO: 0.38 K/UL — SIGNIFICANT CHANGE UP (ref 0–0.7)
EOSINOPHIL NFR BLD AUTO: 0 % — SIGNIFICANT CHANGE UP (ref 0–8)
EOSINOPHIL NFR BLD AUTO: 0.2 % — SIGNIFICANT CHANGE UP (ref 0–8)
EOSINOPHIL NFR BLD AUTO: 0.6 % — SIGNIFICANT CHANGE UP (ref 0–8)
EOSINOPHIL NFR BLD AUTO: 0.7 % — SIGNIFICANT CHANGE UP (ref 0–8)
EOSINOPHIL NFR BLD AUTO: 0.8 % — SIGNIFICANT CHANGE UP (ref 0–8)
EOSINOPHIL NFR BLD AUTO: 0.8 % — SIGNIFICANT CHANGE UP (ref 0–8)
EOSINOPHIL NFR BLD AUTO: 0.9 % — SIGNIFICANT CHANGE UP (ref 0–8)
EOSINOPHIL NFR BLD AUTO: 1 % — SIGNIFICANT CHANGE UP (ref 0–8)
EOSINOPHIL NFR BLD AUTO: 1.1 % — SIGNIFICANT CHANGE UP (ref 0–8)
EOSINOPHIL NFR BLD AUTO: 1.2 % — SIGNIFICANT CHANGE UP (ref 0–8)
EOSINOPHIL NFR BLD AUTO: 1.2 % — SIGNIFICANT CHANGE UP (ref 0–8)
EOSINOPHIL NFR BLD AUTO: 1.4 % — SIGNIFICANT CHANGE UP (ref 0–8)
EOSINOPHIL NFR BLD AUTO: 1.5 % — SIGNIFICANT CHANGE UP (ref 0–8)
EOSINOPHIL NFR BLD AUTO: 1.7 % — SIGNIFICANT CHANGE UP (ref 0–8)
EOSINOPHIL NFR BLD AUTO: 1.7 % — SIGNIFICANT CHANGE UP (ref 0–8)
EOSINOPHIL NFR BLD AUTO: 1.9 % — SIGNIFICANT CHANGE UP (ref 0–8)
EOSINOPHIL NFR BLD AUTO: 2 % — SIGNIFICANT CHANGE UP (ref 0–8)
EOSINOPHIL NFR BLD AUTO: 2.1 % — SIGNIFICANT CHANGE UP (ref 0–8)
EOSINOPHIL NFR BLD AUTO: 2.1 % — SIGNIFICANT CHANGE UP (ref 0–8)
EOSINOPHIL NFR BLD AUTO: 2.7 % — SIGNIFICANT CHANGE UP (ref 0–8)
EOSINOPHIL NFR BLD AUTO: 2.8 % — SIGNIFICANT CHANGE UP (ref 0–8)
EOSINOPHIL NFR BLD AUTO: 2.8 % — SIGNIFICANT CHANGE UP (ref 0–8)
EOSINOPHIL NFR BLD AUTO: 3 % — SIGNIFICANT CHANGE UP (ref 0–8)
EOSINOPHIL NFR BLD AUTO: 3.5 % — SIGNIFICANT CHANGE UP (ref 0–8)
EOSINOPHIL NFR BLD AUTO: 3.8 % — SIGNIFICANT CHANGE UP (ref 0–8)
EOSINOPHIL NFR BLD AUTO: 3.8 % — SIGNIFICANT CHANGE UP (ref 0–8)
EOSINOPHIL NFR BLD AUTO: 4.3 % — SIGNIFICANT CHANGE UP (ref 0–8)
EPI CELLS # UR: 1 /HPF — SIGNIFICANT CHANGE UP (ref 0–5)
EPI CELLS # UR: 3 /HPF — SIGNIFICANT CHANGE UP (ref 0–5)
ESTIMATED AVERAGE GLUCOSE: 100 MG/DL — SIGNIFICANT CHANGE UP (ref 68–114)
GAS PNL BLDA: SIGNIFICANT CHANGE UP
GIANT PLATELETS BLD QL SMEAR: PRESENT — SIGNIFICANT CHANGE UP
GIANT PLATELETS BLD QL SMEAR: PRESENT — SIGNIFICANT CHANGE UP
GLUCOSE BLDC GLUCOMTR-MCNC: 102 MG/DL — HIGH (ref 70–99)
GLUCOSE BLDC GLUCOMTR-MCNC: 102 MG/DL — HIGH (ref 70–99)
GLUCOSE BLDC GLUCOMTR-MCNC: 103 MG/DL — HIGH (ref 70–99)
GLUCOSE BLDC GLUCOMTR-MCNC: 103 MG/DL — HIGH (ref 70–99)
GLUCOSE BLDC GLUCOMTR-MCNC: 104 MG/DL — HIGH (ref 70–99)
GLUCOSE BLDC GLUCOMTR-MCNC: 106 MG/DL — HIGH (ref 70–99)
GLUCOSE BLDC GLUCOMTR-MCNC: 107 MG/DL — HIGH (ref 70–99)
GLUCOSE BLDC GLUCOMTR-MCNC: 108 MG/DL — HIGH (ref 70–99)
GLUCOSE BLDC GLUCOMTR-MCNC: 108 MG/DL — HIGH (ref 70–99)
GLUCOSE BLDC GLUCOMTR-MCNC: 109 MG/DL — HIGH (ref 70–99)
GLUCOSE BLDC GLUCOMTR-MCNC: 110 MG/DL — HIGH (ref 70–99)
GLUCOSE BLDC GLUCOMTR-MCNC: 111 MG/DL — HIGH (ref 70–99)
GLUCOSE BLDC GLUCOMTR-MCNC: 112 MG/DL — HIGH (ref 70–99)
GLUCOSE BLDC GLUCOMTR-MCNC: 113 MG/DL — HIGH (ref 70–99)
GLUCOSE BLDC GLUCOMTR-MCNC: 113 MG/DL — HIGH (ref 70–99)
GLUCOSE BLDC GLUCOMTR-MCNC: 114 MG/DL — HIGH (ref 70–99)
GLUCOSE BLDC GLUCOMTR-MCNC: 115 MG/DL — HIGH (ref 70–99)
GLUCOSE BLDC GLUCOMTR-MCNC: 115 MG/DL — HIGH (ref 70–99)
GLUCOSE BLDC GLUCOMTR-MCNC: 116 MG/DL — HIGH (ref 70–99)
GLUCOSE BLDC GLUCOMTR-MCNC: 116 MG/DL — HIGH (ref 70–99)
GLUCOSE BLDC GLUCOMTR-MCNC: 117 MG/DL — HIGH (ref 70–99)
GLUCOSE BLDC GLUCOMTR-MCNC: 118 MG/DL — HIGH (ref 70–99)
GLUCOSE BLDC GLUCOMTR-MCNC: 120 MG/DL — HIGH (ref 70–99)
GLUCOSE BLDC GLUCOMTR-MCNC: 122 MG/DL — HIGH (ref 70–99)
GLUCOSE BLDC GLUCOMTR-MCNC: 123 MG/DL — HIGH (ref 70–99)
GLUCOSE BLDC GLUCOMTR-MCNC: 124 MG/DL — HIGH (ref 70–99)
GLUCOSE BLDC GLUCOMTR-MCNC: 124 MG/DL — HIGH (ref 70–99)
GLUCOSE BLDC GLUCOMTR-MCNC: 126 MG/DL — HIGH (ref 70–99)
GLUCOSE BLDC GLUCOMTR-MCNC: 126 MG/DL — HIGH (ref 70–99)
GLUCOSE BLDC GLUCOMTR-MCNC: 127 MG/DL — HIGH (ref 70–99)
GLUCOSE BLDC GLUCOMTR-MCNC: 129 MG/DL — HIGH (ref 70–99)
GLUCOSE BLDC GLUCOMTR-MCNC: 130 MG/DL — HIGH (ref 70–99)
GLUCOSE BLDC GLUCOMTR-MCNC: 130 MG/DL — HIGH (ref 70–99)
GLUCOSE BLDC GLUCOMTR-MCNC: 132 MG/DL — HIGH (ref 70–99)
GLUCOSE BLDC GLUCOMTR-MCNC: 132 MG/DL — HIGH (ref 70–99)
GLUCOSE BLDC GLUCOMTR-MCNC: 135 MG/DL — HIGH (ref 70–99)
GLUCOSE BLDC GLUCOMTR-MCNC: 136 MG/DL — HIGH (ref 70–99)
GLUCOSE BLDC GLUCOMTR-MCNC: 136 MG/DL — HIGH (ref 70–99)
GLUCOSE BLDC GLUCOMTR-MCNC: 137 MG/DL — HIGH (ref 70–99)
GLUCOSE BLDC GLUCOMTR-MCNC: 138 MG/DL — HIGH (ref 70–99)
GLUCOSE BLDC GLUCOMTR-MCNC: 140 MG/DL — HIGH (ref 70–99)
GLUCOSE BLDC GLUCOMTR-MCNC: 141 MG/DL — HIGH (ref 70–99)
GLUCOSE BLDC GLUCOMTR-MCNC: 143 MG/DL — HIGH (ref 70–99)
GLUCOSE BLDC GLUCOMTR-MCNC: 146 MG/DL — HIGH (ref 70–99)
GLUCOSE BLDC GLUCOMTR-MCNC: 148 MG/DL — HIGH (ref 70–99)
GLUCOSE BLDC GLUCOMTR-MCNC: 150 MG/DL — HIGH (ref 70–99)
GLUCOSE BLDC GLUCOMTR-MCNC: 151 MG/DL — HIGH (ref 70–99)
GLUCOSE BLDC GLUCOMTR-MCNC: 156 MG/DL — HIGH (ref 70–99)
GLUCOSE BLDC GLUCOMTR-MCNC: 157 MG/DL — HIGH (ref 70–99)
GLUCOSE BLDC GLUCOMTR-MCNC: 159 MG/DL — HIGH (ref 70–99)
GLUCOSE BLDC GLUCOMTR-MCNC: 160 MG/DL — HIGH (ref 70–99)
GLUCOSE BLDC GLUCOMTR-MCNC: 161 MG/DL — HIGH (ref 70–99)
GLUCOSE BLDC GLUCOMTR-MCNC: 161 MG/DL — HIGH (ref 70–99)
GLUCOSE BLDC GLUCOMTR-MCNC: 166 MG/DL — HIGH (ref 70–99)
GLUCOSE BLDC GLUCOMTR-MCNC: 167 MG/DL — HIGH (ref 70–99)
GLUCOSE BLDC GLUCOMTR-MCNC: 167 MG/DL — HIGH (ref 70–99)
GLUCOSE BLDC GLUCOMTR-MCNC: 171 MG/DL — HIGH (ref 70–99)
GLUCOSE BLDC GLUCOMTR-MCNC: 172 MG/DL — HIGH (ref 70–99)
GLUCOSE BLDC GLUCOMTR-MCNC: 173 MG/DL — HIGH (ref 70–99)
GLUCOSE BLDC GLUCOMTR-MCNC: 174 MG/DL — HIGH (ref 70–99)
GLUCOSE BLDC GLUCOMTR-MCNC: 176 MG/DL — HIGH (ref 70–99)
GLUCOSE BLDC GLUCOMTR-MCNC: 179 MG/DL — HIGH (ref 70–99)
GLUCOSE BLDC GLUCOMTR-MCNC: 181 MG/DL — HIGH (ref 70–99)
GLUCOSE BLDC GLUCOMTR-MCNC: 185 MG/DL — HIGH (ref 70–99)
GLUCOSE BLDC GLUCOMTR-MCNC: 187 MG/DL — HIGH (ref 70–99)
GLUCOSE BLDC GLUCOMTR-MCNC: 192 MG/DL — HIGH (ref 70–99)
GLUCOSE BLDC GLUCOMTR-MCNC: 195 MG/DL — HIGH (ref 70–99)
GLUCOSE BLDC GLUCOMTR-MCNC: 196 MG/DL — HIGH (ref 70–99)
GLUCOSE BLDC GLUCOMTR-MCNC: 197 MG/DL — HIGH (ref 70–99)
GLUCOSE BLDC GLUCOMTR-MCNC: 201 MG/DL — HIGH (ref 70–99)
GLUCOSE BLDC GLUCOMTR-MCNC: 204 MG/DL — HIGH (ref 70–99)
GLUCOSE BLDC GLUCOMTR-MCNC: 206 MG/DL — HIGH (ref 70–99)
GLUCOSE BLDC GLUCOMTR-MCNC: 209 MG/DL — HIGH (ref 70–99)
GLUCOSE BLDC GLUCOMTR-MCNC: 214 MG/DL — HIGH (ref 70–99)
GLUCOSE BLDC GLUCOMTR-MCNC: 218 MG/DL — HIGH (ref 70–99)
GLUCOSE BLDC GLUCOMTR-MCNC: 224 MG/DL — HIGH (ref 70–99)
GLUCOSE BLDC GLUCOMTR-MCNC: 228 MG/DL — HIGH (ref 70–99)
GLUCOSE BLDC GLUCOMTR-MCNC: 231 MG/DL — HIGH (ref 70–99)
GLUCOSE BLDC GLUCOMTR-MCNC: 233 MG/DL — HIGH (ref 70–99)
GLUCOSE BLDC GLUCOMTR-MCNC: 233 MG/DL — HIGH (ref 70–99)
GLUCOSE BLDC GLUCOMTR-MCNC: 240 MG/DL — HIGH (ref 70–99)
GLUCOSE BLDC GLUCOMTR-MCNC: 245 MG/DL — HIGH (ref 70–99)
GLUCOSE BLDC GLUCOMTR-MCNC: 251 MG/DL — HIGH (ref 70–99)
GLUCOSE BLDC GLUCOMTR-MCNC: 256 MG/DL — HIGH (ref 70–99)
GLUCOSE BLDC GLUCOMTR-MCNC: 284 MG/DL — HIGH (ref 70–99)
GLUCOSE BLDC GLUCOMTR-MCNC: 69 MG/DL — LOW (ref 70–99)
GLUCOSE BLDC GLUCOMTR-MCNC: 71 MG/DL — SIGNIFICANT CHANGE UP (ref 70–99)
GLUCOSE BLDC GLUCOMTR-MCNC: 71 MG/DL — SIGNIFICANT CHANGE UP (ref 70–99)
GLUCOSE BLDC GLUCOMTR-MCNC: 77 MG/DL — SIGNIFICANT CHANGE UP (ref 70–99)
GLUCOSE BLDC GLUCOMTR-MCNC: 78 MG/DL — SIGNIFICANT CHANGE UP (ref 70–99)
GLUCOSE BLDC GLUCOMTR-MCNC: 80 MG/DL — SIGNIFICANT CHANGE UP (ref 70–99)
GLUCOSE BLDC GLUCOMTR-MCNC: 81 MG/DL — SIGNIFICANT CHANGE UP (ref 70–99)
GLUCOSE BLDC GLUCOMTR-MCNC: 82 MG/DL — SIGNIFICANT CHANGE UP (ref 70–99)
GLUCOSE BLDC GLUCOMTR-MCNC: 86 MG/DL — SIGNIFICANT CHANGE UP (ref 70–99)
GLUCOSE BLDC GLUCOMTR-MCNC: 90 MG/DL — SIGNIFICANT CHANGE UP (ref 70–99)
GLUCOSE BLDC GLUCOMTR-MCNC: 90 MG/DL — SIGNIFICANT CHANGE UP (ref 70–99)
GLUCOSE BLDC GLUCOMTR-MCNC: 93 MG/DL — SIGNIFICANT CHANGE UP (ref 70–99)
GLUCOSE BLDC GLUCOMTR-MCNC: 96 MG/DL — SIGNIFICANT CHANGE UP (ref 70–99)
GLUCOSE BLDC GLUCOMTR-MCNC: 96 MG/DL — SIGNIFICANT CHANGE UP (ref 70–99)
GLUCOSE BLDC GLUCOMTR-MCNC: 99 MG/DL — SIGNIFICANT CHANGE UP (ref 70–99)
GLUCOSE SERPL-MCNC: 103 MG/DL — HIGH (ref 70–99)
GLUCOSE SERPL-MCNC: 105 MG/DL — HIGH (ref 70–99)
GLUCOSE SERPL-MCNC: 111 MG/DL — HIGH (ref 70–99)
GLUCOSE SERPL-MCNC: 112 MG/DL — HIGH (ref 70–99)
GLUCOSE SERPL-MCNC: 115 MG/DL — HIGH (ref 70–99)
GLUCOSE SERPL-MCNC: 115 MG/DL — HIGH (ref 70–99)
GLUCOSE SERPL-MCNC: 117 MG/DL — HIGH (ref 70–99)
GLUCOSE SERPL-MCNC: 123 MG/DL — HIGH (ref 70–99)
GLUCOSE SERPL-MCNC: 138 MG/DL — HIGH (ref 70–99)
GLUCOSE SERPL-MCNC: 140 MG/DL — HIGH (ref 70–99)
GLUCOSE SERPL-MCNC: 141 MG/DL — HIGH (ref 70–99)
GLUCOSE SERPL-MCNC: 146 MG/DL — HIGH (ref 70–99)
GLUCOSE SERPL-MCNC: 154 MG/DL — HIGH (ref 70–99)
GLUCOSE SERPL-MCNC: 154 MG/DL — HIGH (ref 70–99)
GLUCOSE SERPL-MCNC: 155 MG/DL — HIGH (ref 70–99)
GLUCOSE SERPL-MCNC: 157 MG/DL — HIGH (ref 70–99)
GLUCOSE SERPL-MCNC: 169 MG/DL — HIGH (ref 70–99)
GLUCOSE SERPL-MCNC: 170 MG/DL — HIGH (ref 70–99)
GLUCOSE SERPL-MCNC: 182 MG/DL — HIGH (ref 70–99)
GLUCOSE SERPL-MCNC: 205 MG/DL — HIGH (ref 70–99)
GLUCOSE SERPL-MCNC: 209 MG/DL — HIGH (ref 70–99)
GLUCOSE SERPL-MCNC: 213 MG/DL — HIGH (ref 70–99)
GLUCOSE SERPL-MCNC: 213 MG/DL — HIGH (ref 70–99)
GLUCOSE SERPL-MCNC: 220 MG/DL — HIGH (ref 70–99)
GLUCOSE SERPL-MCNC: 227 MG/DL — HIGH (ref 70–99)
GLUCOSE SERPL-MCNC: 238 MG/DL — HIGH (ref 70–99)
GLUCOSE SERPL-MCNC: 241 MG/DL — HIGH (ref 70–99)
GLUCOSE SERPL-MCNC: 320 MG/DL — HIGH (ref 70–99)
GLUCOSE SERPL-MCNC: 63 MG/DL — LOW (ref 70–99)
GLUCOSE SERPL-MCNC: 64 MG/DL — LOW (ref 70–99)
GLUCOSE SERPL-MCNC: 84 MG/DL — SIGNIFICANT CHANGE UP (ref 70–99)
GLUCOSE SERPL-MCNC: 90 MG/DL — SIGNIFICANT CHANGE UP (ref 70–99)
GLUCOSE SERPL-MCNC: 92 MG/DL — SIGNIFICANT CHANGE UP (ref 70–99)
GLUCOSE SERPL-MCNC: 92 MG/DL — SIGNIFICANT CHANGE UP (ref 70–99)
GLUCOSE SERPL-MCNC: 99 MG/DL — SIGNIFICANT CHANGE UP (ref 70–99)
GLUCOSE UR QL: NEGATIVE — SIGNIFICANT CHANGE UP
GLUCOSE UR QL: NEGATIVE — SIGNIFICANT CHANGE UP
HCO3 BLDA-SCNC: 16 MMOL/L — LOW (ref 23–27)
HCO3 BLDA-SCNC: 21 MMOL/L — LOW (ref 23–27)
HCO3 BLDA-SCNC: 26 MMOL/L — SIGNIFICANT CHANGE UP (ref 23–27)
HCO3 BLDA-SCNC: 27 MMOL/L — SIGNIFICANT CHANGE UP (ref 23–27)
HCO3 BLDA-SCNC: 30 MMOL/L — HIGH (ref 23–27)
HCO3 BLDA-SCNC: 32 MMOL/L — HIGH (ref 23–27)
HCT VFR BLD CALC: 19.9 % — LOW (ref 37–47)
HCT VFR BLD CALC: 20.6 % — LOW (ref 37–47)
HCT VFR BLD CALC: 21.4 % — LOW (ref 37–47)
HCT VFR BLD CALC: 23.5 % — LOW (ref 37–47)
HCT VFR BLD CALC: 23.8 % — LOW (ref 37–47)
HCT VFR BLD CALC: 24.1 % — LOW (ref 37–47)
HCT VFR BLD CALC: 24.3 % — LOW (ref 37–47)
HCT VFR BLD CALC: 24.3 % — LOW (ref 37–47)
HCT VFR BLD CALC: 24.5 % — LOW (ref 37–47)
HCT VFR BLD CALC: 24.6 % — LOW (ref 37–47)
HCT VFR BLD CALC: 25 % — LOW (ref 37–47)
HCT VFR BLD CALC: 25.4 % — LOW (ref 37–47)
HCT VFR BLD CALC: 25.5 % — LOW (ref 37–47)
HCT VFR BLD CALC: 26 % — LOW (ref 37–47)
HCT VFR BLD CALC: 26.2 % — LOW (ref 37–47)
HCT VFR BLD CALC: 26.3 % — LOW (ref 37–47)
HCT VFR BLD CALC: 26.3 % — LOW (ref 37–47)
HCT VFR BLD CALC: 26.5 % — LOW (ref 37–47)
HCT VFR BLD CALC: 26.6 % — LOW (ref 37–47)
HCT VFR BLD CALC: 26.7 % — LOW (ref 37–47)
HCT VFR BLD CALC: 26.8 % — LOW (ref 37–47)
HCT VFR BLD CALC: 27.3 % — LOW (ref 37–47)
HCT VFR BLD CALC: 27.3 % — LOW (ref 37–47)
HCT VFR BLD CALC: 27.5 % — LOW (ref 37–47)
HCT VFR BLD CALC: 27.8 % — LOW (ref 37–47)
HCT VFR BLD CALC: 28 % — LOW (ref 37–47)
HCT VFR BLD CALC: 28.2 % — LOW (ref 37–47)
HCT VFR BLD CALC: 28.6 % — LOW (ref 37–47)
HCT VFR BLD CALC: 28.7 % — LOW (ref 37–47)
HCT VFR BLD CALC: 28.9 % — LOW (ref 37–47)
HCT VFR BLD CALC: 29.1 % — LOW (ref 37–47)
HCT VFR BLD CALC: 29.4 % — LOW (ref 37–47)
HCT VFR BLD CALC: 29.5 % — LOW (ref 37–47)
HCT VFR BLD CALC: 29.5 % — LOW (ref 37–47)
HCT VFR BLD CALC: 29.7 % — LOW (ref 37–47)
HCT VFR BLD CALC: 30.1 % — LOW (ref 37–47)
HCT VFR BLD CALC: 30.6 % — LOW (ref 37–47)
HCT VFR BLD CALC: 32.4 % — LOW (ref 37–47)
HGB BLD-MCNC: 6.3 G/DL — CRITICAL LOW (ref 12–16)
HGB BLD-MCNC: 6.5 G/DL — CRITICAL LOW (ref 12–16)
HGB BLD-MCNC: 7.1 G/DL — LOW (ref 12–16)
HGB BLD-MCNC: 7.3 G/DL — LOW (ref 12–16)
HGB BLD-MCNC: 7.3 G/DL — LOW (ref 12–16)
HGB BLD-MCNC: 7.4 G/DL — LOW (ref 12–16)
HGB BLD-MCNC: 7.5 G/DL — LOW (ref 12–16)
HGB BLD-MCNC: 7.7 G/DL — LOW (ref 12–16)
HGB BLD-MCNC: 7.8 G/DL — LOW (ref 12–16)
HGB BLD-MCNC: 7.9 G/DL — LOW (ref 12–16)
HGB BLD-MCNC: 8 G/DL — LOW (ref 12–16)
HGB BLD-MCNC: 8.1 G/DL — LOW (ref 12–16)
HGB BLD-MCNC: 8.1 G/DL — LOW (ref 12–16)
HGB BLD-MCNC: 8.2 G/DL — LOW (ref 12–16)
HGB BLD-MCNC: 8.2 G/DL — LOW (ref 12–16)
HGB BLD-MCNC: 8.4 G/DL — LOW (ref 12–16)
HGB BLD-MCNC: 8.5 G/DL — LOW (ref 12–16)
HGB BLD-MCNC: 8.6 G/DL — LOW (ref 12–16)
HGB BLD-MCNC: 8.6 G/DL — LOW (ref 12–16)
HGB BLD-MCNC: 8.7 G/DL — LOW (ref 12–16)
HGB BLD-MCNC: 8.7 G/DL — LOW (ref 12–16)
HGB BLD-MCNC: 8.8 G/DL — LOW (ref 12–16)
HGB BLD-MCNC: 8.9 G/DL — LOW (ref 12–16)
HGB BLD-MCNC: 9 G/DL — LOW (ref 12–16)
HGB BLD-MCNC: 9 G/DL — LOW (ref 12–16)
HGB BLD-MCNC: 9.1 G/DL — LOW (ref 12–16)
HGB BLD-MCNC: 9.2 G/DL — LOW (ref 12–16)
HGB BLD-MCNC: 9.2 G/DL — LOW (ref 12–16)
HGB BLD-MCNC: 9.3 G/DL — LOW (ref 12–16)
HGB BLD-MCNC: 9.3 G/DL — LOW (ref 12–16)
HGB BLD-MCNC: 9.9 G/DL — LOW (ref 12–16)
HOROWITZ INDEX BLDA+IHG-RTO: 30 — SIGNIFICANT CHANGE UP
HOROWITZ INDEX BLDA+IHG-RTO: 30 — SIGNIFICANT CHANGE UP
HYALINE CASTS # UR AUTO: 0 /LPF — SIGNIFICANT CHANGE UP (ref 0–7)
HYALINE CASTS # UR AUTO: 16 /LPF — HIGH (ref 0–7)
IMM GRANULOCYTES NFR BLD AUTO: 0.3 % — SIGNIFICANT CHANGE UP (ref 0.1–0.3)
IMM GRANULOCYTES NFR BLD AUTO: 0.4 % — HIGH (ref 0.1–0.3)
IMM GRANULOCYTES NFR BLD AUTO: 0.4 % — HIGH (ref 0.1–0.3)
IMM GRANULOCYTES NFR BLD AUTO: 0.5 % — HIGH (ref 0.1–0.3)
IMM GRANULOCYTES NFR BLD AUTO: 0.6 % — HIGH (ref 0.1–0.3)
IMM GRANULOCYTES NFR BLD AUTO: 0.7 % — HIGH (ref 0.1–0.3)
IMM GRANULOCYTES NFR BLD AUTO: 0.8 % — HIGH (ref 0.1–0.3)
IMM GRANULOCYTES NFR BLD AUTO: 0.9 % — HIGH (ref 0.1–0.3)
IMM GRANULOCYTES NFR BLD AUTO: 1.1 % — HIGH (ref 0.1–0.3)
IMM GRANULOCYTES NFR BLD AUTO: 1.1 % — HIGH (ref 0.1–0.3)
IMM GRANULOCYTES NFR BLD AUTO: 1.2 % — HIGH (ref 0.1–0.3)
IMM GRANULOCYTES NFR BLD AUTO: 2 % — HIGH (ref 0.1–0.3)
IMM GRANULOCYTES NFR BLD AUTO: 2.2 % — HIGH (ref 0.1–0.3)
IMM GRANULOCYTES NFR BLD AUTO: 3 % — HIGH (ref 0.1–0.3)
IMM GRANULOCYTES NFR BLD AUTO: 4.3 % — HIGH (ref 0.1–0.3)
IMM GRANULOCYTES NFR BLD AUTO: 4.5 % — HIGH (ref 0.1–0.3)
IMM GRANULOCYTES NFR BLD AUTO: 4.8 % — HIGH (ref 0.1–0.3)
IMM GRANULOCYTES NFR BLD AUTO: 5.2 % — HIGH (ref 0.1–0.3)
IMM GRANULOCYTES NFR BLD AUTO: 5.3 % — HIGH (ref 0.1–0.3)
IMM GRANULOCYTES NFR BLD AUTO: 5.9 % — HIGH (ref 0.1–0.3)
INR BLD: 1.11 RATIO — SIGNIFICANT CHANGE UP (ref 0.65–1.3)
INR BLD: 1.14 RATIO — SIGNIFICANT CHANGE UP (ref 0.65–1.3)
INR BLD: 1.17 RATIO — SIGNIFICANT CHANGE UP (ref 0.65–1.3)
INR BLD: 1.17 RATIO — SIGNIFICANT CHANGE UP (ref 0.65–1.3)
INR BLD: 1.2 RATIO — SIGNIFICANT CHANGE UP (ref 0.65–1.3)
INR BLD: 1.23 RATIO — SIGNIFICANT CHANGE UP (ref 0.65–1.3)
INR BLD: 1.28 RATIO — SIGNIFICANT CHANGE UP (ref 0.65–1.3)
INR BLD: 1.28 RATIO — SIGNIFICANT CHANGE UP (ref 0.65–1.3)
INR BLD: 1.29 RATIO — SIGNIFICANT CHANGE UP (ref 0.65–1.3)
INR BLD: 1.3 RATIO — SIGNIFICANT CHANGE UP (ref 0.65–1.3)
INR BLD: 1.3 RATIO — SIGNIFICANT CHANGE UP (ref 0.65–1.3)
INR BLD: 1.31 RATIO — HIGH (ref 0.65–1.3)
INR BLD: 1.37 RATIO — HIGH (ref 0.65–1.3)
INR BLD: 1.37 RATIO — HIGH (ref 0.65–1.3)
INR BLD: 1.41 RATIO — HIGH (ref 0.65–1.3)
INR BLD: 1.43 RATIO — HIGH (ref 0.65–1.3)
INR BLD: 1.46 RATIO — HIGH (ref 0.65–1.3)
INR BLD: 1.54 RATIO — HIGH (ref 0.65–1.3)
INR BLD: 1.75 RATIO — HIGH (ref 0.65–1.3)
INR BLD: 1.78 RATIO — HIGH (ref 0.65–1.3)
INR BLD: 1.79 RATIO — HIGH (ref 0.65–1.3)
INR BLD: 2.18 RATIO — HIGH (ref 0.65–1.3)
INR BLD: 2.54 RATIO — HIGH (ref 0.65–1.3)
INR BLD: 4.56 RATIO — HIGH (ref 0.65–1.3)
KETONES UR-MCNC: NEGATIVE — SIGNIFICANT CHANGE UP
KETONES UR-MCNC: NEGATIVE — SIGNIFICANT CHANGE UP
LACTATE SERPL-SCNC: 1.2 MMOL/L — SIGNIFICANT CHANGE UP (ref 0.7–2)
LACTATE SERPL-SCNC: 1.6 MMOL/L — SIGNIFICANT CHANGE UP (ref 0.7–2)
LACTATE SERPL-SCNC: 5.5 MMOL/L — CRITICAL HIGH (ref 0.7–2)
LEUKOCYTE ESTERASE UR-ACNC: ABNORMAL
LEUKOCYTE ESTERASE UR-ACNC: ABNORMAL
LIDOCAIN IGE QN: 79 U/L — HIGH (ref 7–60)
LYMPHOCYTES # BLD AUTO: 0.33 K/UL — LOW (ref 1.2–3.4)
LYMPHOCYTES # BLD AUTO: 0.38 K/UL — LOW (ref 1.2–3.4)
LYMPHOCYTES # BLD AUTO: 0.46 K/UL — LOW (ref 1.2–3.4)
LYMPHOCYTES # BLD AUTO: 0.49 K/UL — LOW (ref 1.2–3.4)
LYMPHOCYTES # BLD AUTO: 0.54 K/UL — LOW (ref 1.2–3.4)
LYMPHOCYTES # BLD AUTO: 0.55 K/UL — LOW (ref 1.2–3.4)
LYMPHOCYTES # BLD AUTO: 0.55 K/UL — LOW (ref 1.2–3.4)
LYMPHOCYTES # BLD AUTO: 0.58 K/UL — LOW (ref 1.2–3.4)
LYMPHOCYTES # BLD AUTO: 0.59 K/UL — LOW (ref 1.2–3.4)
LYMPHOCYTES # BLD AUTO: 0.59 K/UL — LOW (ref 1.2–3.4)
LYMPHOCYTES # BLD AUTO: 0.6 K/UL — LOW (ref 1.2–3.4)
LYMPHOCYTES # BLD AUTO: 0.6 K/UL — LOW (ref 1.2–3.4)
LYMPHOCYTES # BLD AUTO: 0.61 K/UL — LOW (ref 1.2–3.4)
LYMPHOCYTES # BLD AUTO: 0.68 K/UL — LOW (ref 1.2–3.4)
LYMPHOCYTES # BLD AUTO: 0.71 K/UL — LOW (ref 1.2–3.4)
LYMPHOCYTES # BLD AUTO: 0.72 K/UL — LOW (ref 1.2–3.4)
LYMPHOCYTES # BLD AUTO: 0.75 K/UL — LOW (ref 1.2–3.4)
LYMPHOCYTES # BLD AUTO: 0.76 K/UL — LOW (ref 1.2–3.4)
LYMPHOCYTES # BLD AUTO: 0.78 K/UL — LOW (ref 1.2–3.4)
LYMPHOCYTES # BLD AUTO: 0.79 K/UL — LOW (ref 1.2–3.4)
LYMPHOCYTES # BLD AUTO: 0.79 K/UL — LOW (ref 1.2–3.4)
LYMPHOCYTES # BLD AUTO: 0.81 K/UL — LOW (ref 1.2–3.4)
LYMPHOCYTES # BLD AUTO: 0.81 K/UL — LOW (ref 1.2–3.4)
LYMPHOCYTES # BLD AUTO: 0.86 K/UL — LOW (ref 1.2–3.4)
LYMPHOCYTES # BLD AUTO: 0.86 K/UL — LOW (ref 1.2–3.4)
LYMPHOCYTES # BLD AUTO: 1.12 K/UL — LOW (ref 1.2–3.4)
LYMPHOCYTES # BLD AUTO: 1.25 K/UL — SIGNIFICANT CHANGE UP (ref 1.2–3.4)
LYMPHOCYTES # BLD AUTO: 10.1 % — LOW (ref 20.5–51.1)
LYMPHOCYTES # BLD AUTO: 11.2 % — LOW (ref 20.5–51.1)
LYMPHOCYTES # BLD AUTO: 12 % — LOW (ref 20.5–51.1)
LYMPHOCYTES # BLD AUTO: 12.3 % — LOW (ref 20.5–51.1)
LYMPHOCYTES # BLD AUTO: 12.3 % — LOW (ref 20.5–51.1)
LYMPHOCYTES # BLD AUTO: 12.7 % — LOW (ref 20.5–51.1)
LYMPHOCYTES # BLD AUTO: 13 % — LOW (ref 20.5–51.1)
LYMPHOCYTES # BLD AUTO: 13.6 % — LOW (ref 20.5–51.1)
LYMPHOCYTES # BLD AUTO: 13.6 % — LOW (ref 20.5–51.1)
LYMPHOCYTES # BLD AUTO: 13.7 % — LOW (ref 20.5–51.1)
LYMPHOCYTES # BLD AUTO: 14 % — LOW (ref 20.5–51.1)
LYMPHOCYTES # BLD AUTO: 14.2 % — LOW (ref 20.5–51.1)
LYMPHOCYTES # BLD AUTO: 2.73 K/UL — SIGNIFICANT CHANGE UP (ref 1.2–3.4)
LYMPHOCYTES # BLD AUTO: 20.7 % — SIGNIFICANT CHANGE UP (ref 20.5–51.1)
LYMPHOCYTES # BLD AUTO: 22.2 % — SIGNIFICANT CHANGE UP (ref 20.5–51.1)
LYMPHOCYTES # BLD AUTO: 32.2 % — SIGNIFICANT CHANGE UP (ref 20.5–51.1)
LYMPHOCYTES # BLD AUTO: 36.1 % — SIGNIFICANT CHANGE UP (ref 20.5–51.1)
LYMPHOCYTES # BLD AUTO: 4.2 % — LOW (ref 20.5–51.1)
LYMPHOCYTES # BLD AUTO: 4.3 % — LOW (ref 20.5–51.1)
LYMPHOCYTES # BLD AUTO: 4.4 % — LOW (ref 20.5–51.1)
LYMPHOCYTES # BLD AUTO: 4.5 % — LOW (ref 20.5–51.1)
LYMPHOCYTES # BLD AUTO: 4.57 K/UL — HIGH (ref 1.2–3.4)
LYMPHOCYTES # BLD AUTO: 5 % — LOW (ref 20.5–51.1)
LYMPHOCYTES # BLD AUTO: 5.1 % — LOW (ref 20.5–51.1)
LYMPHOCYTES # BLD AUTO: 5.2 % — LOW (ref 20.5–51.1)
LYMPHOCYTES # BLD AUTO: 6.4 % — LOW (ref 20.5–51.1)
LYMPHOCYTES # BLD AUTO: 6.4 % — LOW (ref 20.5–51.1)
LYMPHOCYTES # BLD AUTO: 6.5 % — LOW (ref 20.5–51.1)
LYMPHOCYTES # BLD AUTO: 6.6 % — LOW (ref 20.5–51.1)
LYMPHOCYTES # BLD AUTO: 7.2 % — LOW (ref 20.5–51.1)
LYMPHOCYTES # BLD AUTO: 7.2 % — LOW (ref 20.5–51.1)
LYMPHOCYTES # BLD AUTO: 7.5 % — LOW (ref 20.5–51.1)
LYMPHOCYTES # BLD AUTO: 8.8 % — LOW (ref 20.5–51.1)
MAGNESIUM SERPL-MCNC: 1.8 MG/DL — SIGNIFICANT CHANGE UP (ref 1.8–2.4)
MAGNESIUM SERPL-MCNC: 1.9 MG/DL — SIGNIFICANT CHANGE UP (ref 1.8–2.4)
MAGNESIUM SERPL-MCNC: 2 MG/DL — SIGNIFICANT CHANGE UP (ref 1.8–2.4)
MAGNESIUM SERPL-MCNC: 2.1 MG/DL — SIGNIFICANT CHANGE UP (ref 1.8–2.4)
MAGNESIUM SERPL-MCNC: 2.2 MG/DL — SIGNIFICANT CHANGE UP (ref 1.8–2.4)
MAGNESIUM SERPL-MCNC: 2.3 MG/DL — SIGNIFICANT CHANGE UP (ref 1.8–2.4)
MAGNESIUM SERPL-MCNC: 2.4 MG/DL — SIGNIFICANT CHANGE UP (ref 1.8–2.4)
MAGNESIUM SERPL-MCNC: 2.5 MG/DL — HIGH (ref 1.8–2.4)
MANUAL SMEAR VERIFICATION: SIGNIFICANT CHANGE UP
MCHC RBC-ENTMCNC: 27.7 PG — SIGNIFICANT CHANGE UP (ref 27–31)
MCHC RBC-ENTMCNC: 27.8 PG — SIGNIFICANT CHANGE UP (ref 27–31)
MCHC RBC-ENTMCNC: 27.8 PG — SIGNIFICANT CHANGE UP (ref 27–31)
MCHC RBC-ENTMCNC: 27.9 PG — SIGNIFICANT CHANGE UP (ref 27–31)
MCHC RBC-ENTMCNC: 28 PG — SIGNIFICANT CHANGE UP (ref 27–31)
MCHC RBC-ENTMCNC: 28 PG — SIGNIFICANT CHANGE UP (ref 27–31)
MCHC RBC-ENTMCNC: 28.1 PG — SIGNIFICANT CHANGE UP (ref 27–31)
MCHC RBC-ENTMCNC: 28.1 PG — SIGNIFICANT CHANGE UP (ref 27–31)
MCHC RBC-ENTMCNC: 28.2 PG — SIGNIFICANT CHANGE UP (ref 27–31)
MCHC RBC-ENTMCNC: 28.3 PG — SIGNIFICANT CHANGE UP (ref 27–31)
MCHC RBC-ENTMCNC: 28.4 PG — SIGNIFICANT CHANGE UP (ref 27–31)
MCHC RBC-ENTMCNC: 28.5 PG — SIGNIFICANT CHANGE UP (ref 27–31)
MCHC RBC-ENTMCNC: 28.5 PG — SIGNIFICANT CHANGE UP (ref 27–31)
MCHC RBC-ENTMCNC: 28.6 PG — SIGNIFICANT CHANGE UP (ref 27–31)
MCHC RBC-ENTMCNC: 28.7 G/DL — LOW (ref 32–37)
MCHC RBC-ENTMCNC: 28.7 PG — SIGNIFICANT CHANGE UP (ref 27–31)
MCHC RBC-ENTMCNC: 28.8 PG — SIGNIFICANT CHANGE UP (ref 27–31)
MCHC RBC-ENTMCNC: 28.9 PG — SIGNIFICANT CHANGE UP (ref 27–31)
MCHC RBC-ENTMCNC: 28.9 PG — SIGNIFICANT CHANGE UP (ref 27–31)
MCHC RBC-ENTMCNC: 29 G/DL — LOW (ref 32–37)
MCHC RBC-ENTMCNC: 29 PG — SIGNIFICANT CHANGE UP (ref 27–31)
MCHC RBC-ENTMCNC: 29.1 G/DL — LOW (ref 32–37)
MCHC RBC-ENTMCNC: 29.6 G/DL — LOW (ref 32–37)
MCHC RBC-ENTMCNC: 29.7 G/DL — LOW (ref 32–37)
MCHC RBC-ENTMCNC: 29.8 G/DL — LOW (ref 32–37)
MCHC RBC-ENTMCNC: 30 G/DL — LOW (ref 32–37)
MCHC RBC-ENTMCNC: 30.4 G/DL — LOW (ref 32–37)
MCHC RBC-ENTMCNC: 30.4 G/DL — LOW (ref 32–37)
MCHC RBC-ENTMCNC: 30.5 G/DL — LOW (ref 32–37)
MCHC RBC-ENTMCNC: 30.6 G/DL — LOW (ref 32–37)
MCHC RBC-ENTMCNC: 30.7 G/DL — LOW (ref 32–37)
MCHC RBC-ENTMCNC: 30.7 G/DL — LOW (ref 32–37)
MCHC RBC-ENTMCNC: 30.8 G/DL — LOW (ref 32–37)
MCHC RBC-ENTMCNC: 30.9 G/DL — LOW (ref 32–37)
MCHC RBC-ENTMCNC: 31 G/DL — LOW (ref 32–37)
MCHC RBC-ENTMCNC: 31.1 G/DL — LOW (ref 32–37)
MCHC RBC-ENTMCNC: 31.1 G/DL — LOW (ref 32–37)
MCHC RBC-ENTMCNC: 31.2 G/DL — LOW (ref 32–37)
MCHC RBC-ENTMCNC: 31.3 G/DL — LOW (ref 32–37)
MCHC RBC-ENTMCNC: 31.4 G/DL — LOW (ref 32–37)
MCHC RBC-ENTMCNC: 31.4 G/DL — LOW (ref 32–37)
MCHC RBC-ENTMCNC: 31.5 G/DL — LOW (ref 32–37)
MCHC RBC-ENTMCNC: 31.5 G/DL — LOW (ref 32–37)
MCHC RBC-ENTMCNC: 31.6 G/DL — LOW (ref 32–37)
MCHC RBC-ENTMCNC: 31.7 G/DL — LOW (ref 32–37)
MCHC RBC-ENTMCNC: 31.7 G/DL — LOW (ref 32–37)
MCHC RBC-ENTMCNC: 31.9 G/DL — LOW (ref 32–37)
MCHC RBC-ENTMCNC: 32.2 G/DL — SIGNIFICANT CHANGE UP (ref 32–37)
MCHC RBC-ENTMCNC: 32.3 G/DL — SIGNIFICANT CHANGE UP (ref 32–37)
MCHC RBC-ENTMCNC: 32.4 G/DL — SIGNIFICANT CHANGE UP (ref 32–37)
MCHC RBC-ENTMCNC: 32.5 G/DL — SIGNIFICANT CHANGE UP (ref 32–37)
MCHC RBC-ENTMCNC: 32.7 G/DL — SIGNIFICANT CHANGE UP (ref 32–37)
MCHC RBC-ENTMCNC: 32.9 G/DL — SIGNIFICANT CHANGE UP (ref 32–37)
MCHC RBC-ENTMCNC: 33.2 G/DL — SIGNIFICANT CHANGE UP (ref 32–37)
MCV RBC AUTO: 86.5 FL — SIGNIFICANT CHANGE UP (ref 81–99)
MCV RBC AUTO: 86.6 FL — SIGNIFICANT CHANGE UP (ref 81–99)
MCV RBC AUTO: 86.9 FL — SIGNIFICANT CHANGE UP (ref 81–99)
MCV RBC AUTO: 87.5 FL — SIGNIFICANT CHANGE UP (ref 81–99)
MCV RBC AUTO: 87.5 FL — SIGNIFICANT CHANGE UP (ref 81–99)
MCV RBC AUTO: 87.9 FL — SIGNIFICANT CHANGE UP (ref 81–99)
MCV RBC AUTO: 88.1 FL — SIGNIFICANT CHANGE UP (ref 81–99)
MCV RBC AUTO: 88.5 FL — SIGNIFICANT CHANGE UP (ref 81–99)
MCV RBC AUTO: 89 FL — SIGNIFICANT CHANGE UP (ref 81–99)
MCV RBC AUTO: 89.7 FL — SIGNIFICANT CHANGE UP (ref 81–99)
MCV RBC AUTO: 90 FL — SIGNIFICANT CHANGE UP (ref 81–99)
MCV RBC AUTO: 90.4 FL — SIGNIFICANT CHANGE UP (ref 81–99)
MCV RBC AUTO: 90.7 FL — SIGNIFICANT CHANGE UP (ref 81–99)
MCV RBC AUTO: 91.1 FL — SIGNIFICANT CHANGE UP (ref 81–99)
MCV RBC AUTO: 91.1 FL — SIGNIFICANT CHANGE UP (ref 81–99)
MCV RBC AUTO: 91.2 FL — SIGNIFICANT CHANGE UP (ref 81–99)
MCV RBC AUTO: 91.3 FL — SIGNIFICANT CHANGE UP (ref 81–99)
MCV RBC AUTO: 91.3 FL — SIGNIFICANT CHANGE UP (ref 81–99)
MCV RBC AUTO: 91.5 FL — SIGNIFICANT CHANGE UP (ref 81–99)
MCV RBC AUTO: 91.7 FL — SIGNIFICANT CHANGE UP (ref 81–99)
MCV RBC AUTO: 91.8 FL — SIGNIFICANT CHANGE UP (ref 81–99)
MCV RBC AUTO: 91.8 FL — SIGNIFICANT CHANGE UP (ref 81–99)
MCV RBC AUTO: 91.9 FL — SIGNIFICANT CHANGE UP (ref 81–99)
MCV RBC AUTO: 92 FL — SIGNIFICANT CHANGE UP (ref 81–99)
MCV RBC AUTO: 92 FL — SIGNIFICANT CHANGE UP (ref 81–99)
MCV RBC AUTO: 92.1 FL — SIGNIFICANT CHANGE UP (ref 81–99)
MCV RBC AUTO: 92.5 FL — SIGNIFICANT CHANGE UP (ref 81–99)
MCV RBC AUTO: 92.6 FL — SIGNIFICANT CHANGE UP (ref 81–99)
MCV RBC AUTO: 92.7 FL — SIGNIFICANT CHANGE UP (ref 81–99)
MCV RBC AUTO: 92.8 FL — SIGNIFICANT CHANGE UP (ref 81–99)
MCV RBC AUTO: 92.8 FL — SIGNIFICANT CHANGE UP (ref 81–99)
MCV RBC AUTO: 92.9 FL — SIGNIFICANT CHANGE UP (ref 81–99)
MCV RBC AUTO: 93.5 FL — SIGNIFICANT CHANGE UP (ref 81–99)
MCV RBC AUTO: 93.5 FL — SIGNIFICANT CHANGE UP (ref 81–99)
MCV RBC AUTO: 93.9 FL — SIGNIFICANT CHANGE UP (ref 81–99)
MCV RBC AUTO: 95.3 FL — SIGNIFICANT CHANGE UP (ref 81–99)
MCV RBC AUTO: 95.6 FL — SIGNIFICANT CHANGE UP (ref 81–99)
MCV RBC AUTO: 95.7 FL — SIGNIFICANT CHANGE UP (ref 81–99)
MCV RBC AUTO: 95.9 FL — SIGNIFICANT CHANGE UP (ref 81–99)
MCV RBC AUTO: 96.4 FL — SIGNIFICANT CHANGE UP (ref 81–99)
MCV RBC AUTO: 97.3 FL — SIGNIFICANT CHANGE UP (ref 81–99)
MICROCYTES BLD QL: SLIGHT — SIGNIFICANT CHANGE UP
MICROCYTES BLD QL: SLIGHT — SIGNIFICANT CHANGE UP
MONOCYTES # BLD AUTO: 0.22 K/UL — SIGNIFICANT CHANGE UP (ref 0.1–0.6)
MONOCYTES # BLD AUTO: 0.32 K/UL — SIGNIFICANT CHANGE UP (ref 0.1–0.6)
MONOCYTES # BLD AUTO: 0.4 K/UL — SIGNIFICANT CHANGE UP (ref 0.1–0.6)
MONOCYTES # BLD AUTO: 0.43 K/UL — SIGNIFICANT CHANGE UP (ref 0.1–0.6)
MONOCYTES # BLD AUTO: 0.44 K/UL — SIGNIFICANT CHANGE UP (ref 0.1–0.6)
MONOCYTES # BLD AUTO: 0.45 K/UL — SIGNIFICANT CHANGE UP (ref 0.1–0.6)
MONOCYTES # BLD AUTO: 0.51 K/UL — SIGNIFICANT CHANGE UP (ref 0.1–0.6)
MONOCYTES # BLD AUTO: 0.51 K/UL — SIGNIFICANT CHANGE UP (ref 0.1–0.6)
MONOCYTES # BLD AUTO: 0.53 K/UL — SIGNIFICANT CHANGE UP (ref 0.1–0.6)
MONOCYTES # BLD AUTO: 0.58 K/UL — SIGNIFICANT CHANGE UP (ref 0.1–0.6)
MONOCYTES # BLD AUTO: 0.59 K/UL — SIGNIFICANT CHANGE UP (ref 0.1–0.6)
MONOCYTES # BLD AUTO: 0.61 K/UL — HIGH (ref 0.1–0.6)
MONOCYTES # BLD AUTO: 0.61 K/UL — HIGH (ref 0.1–0.6)
MONOCYTES # BLD AUTO: 0.63 K/UL — HIGH (ref 0.1–0.6)
MONOCYTES # BLD AUTO: 0.65 K/UL — HIGH (ref 0.1–0.6)
MONOCYTES # BLD AUTO: 0.66 K/UL — HIGH (ref 0.1–0.6)
MONOCYTES # BLD AUTO: 0.69 K/UL — HIGH (ref 0.1–0.6)
MONOCYTES # BLD AUTO: 0.69 K/UL — HIGH (ref 0.1–0.6)
MONOCYTES # BLD AUTO: 0.7 K/UL — HIGH (ref 0.1–0.6)
MONOCYTES # BLD AUTO: 0.74 K/UL — HIGH (ref 0.1–0.6)
MONOCYTES # BLD AUTO: 0.74 K/UL — HIGH (ref 0.1–0.6)
MONOCYTES # BLD AUTO: 0.76 K/UL — HIGH (ref 0.1–0.6)
MONOCYTES # BLD AUTO: 0.87 K/UL — HIGH (ref 0.1–0.6)
MONOCYTES # BLD AUTO: 1.15 K/UL — HIGH (ref 0.1–0.6)
MONOCYTES # BLD AUTO: 1.17 K/UL — HIGH (ref 0.1–0.6)
MONOCYTES # BLD AUTO: 1.18 K/UL — HIGH (ref 0.1–0.6)
MONOCYTES # BLD AUTO: 1.25 K/UL — HIGH (ref 0.1–0.6)
MONOCYTES # BLD AUTO: 1.33 K/UL — HIGH (ref 0.1–0.6)
MONOCYTES # BLD AUTO: 1.38 K/UL — HIGH (ref 0.1–0.6)
MONOCYTES # BLD AUTO: 1.52 K/UL — HIGH (ref 0.1–0.6)
MONOCYTES # BLD AUTO: 1.63 K/UL — HIGH (ref 0.1–0.6)
MONOCYTES # BLD AUTO: 1.73 K/UL — HIGH (ref 0.1–0.6)
MONOCYTES # BLD AUTO: 1.74 K/UL — HIGH (ref 0.1–0.6)
MONOCYTES NFR BLD AUTO: 10 % — HIGH (ref 1.7–9.3)
MONOCYTES NFR BLD AUTO: 10.1 % — HIGH (ref 1.7–9.3)
MONOCYTES NFR BLD AUTO: 10.5 % — HIGH (ref 1.7–9.3)
MONOCYTES NFR BLD AUTO: 10.6 % — HIGH (ref 1.7–9.3)
MONOCYTES NFR BLD AUTO: 10.8 % — HIGH (ref 1.7–9.3)
MONOCYTES NFR BLD AUTO: 11.2 % — HIGH (ref 1.7–9.3)
MONOCYTES NFR BLD AUTO: 11.7 % — HIGH (ref 1.7–9.3)
MONOCYTES NFR BLD AUTO: 11.7 % — HIGH (ref 1.7–9.3)
MONOCYTES NFR BLD AUTO: 12.1 % — HIGH (ref 1.7–9.3)
MONOCYTES NFR BLD AUTO: 12.8 % — HIGH (ref 1.7–9.3)
MONOCYTES NFR BLD AUTO: 12.8 % — HIGH (ref 1.7–9.3)
MONOCYTES NFR BLD AUTO: 13.1 % — HIGH (ref 1.7–9.3)
MONOCYTES NFR BLD AUTO: 13.6 % — HIGH (ref 1.7–9.3)
MONOCYTES NFR BLD AUTO: 14 % — HIGH (ref 1.7–9.3)
MONOCYTES NFR BLD AUTO: 2.6 % — SIGNIFICANT CHANGE UP (ref 1.7–9.3)
MONOCYTES NFR BLD AUTO: 5.2 % — SIGNIFICANT CHANGE UP (ref 1.7–9.3)
MONOCYTES NFR BLD AUTO: 6.9 % — SIGNIFICANT CHANGE UP (ref 1.7–9.3)
MONOCYTES NFR BLD AUTO: 7.2 % — SIGNIFICANT CHANGE UP (ref 1.7–9.3)
MONOCYTES NFR BLD AUTO: 7.4 % — SIGNIFICANT CHANGE UP (ref 1.7–9.3)
MONOCYTES NFR BLD AUTO: 7.5 % — SIGNIFICANT CHANGE UP (ref 1.7–9.3)
MONOCYTES NFR BLD AUTO: 8 % — SIGNIFICANT CHANGE UP (ref 1.7–9.3)
MONOCYTES NFR BLD AUTO: 8 % — SIGNIFICANT CHANGE UP (ref 1.7–9.3)
MONOCYTES NFR BLD AUTO: 8.1 % — SIGNIFICANT CHANGE UP (ref 1.7–9.3)
MONOCYTES NFR BLD AUTO: 8.7 % — SIGNIFICANT CHANGE UP (ref 1.7–9.3)
MONOCYTES NFR BLD AUTO: 8.9 % — SIGNIFICANT CHANGE UP (ref 1.7–9.3)
MONOCYTES NFR BLD AUTO: 9 % — SIGNIFICANT CHANGE UP (ref 1.7–9.3)
MONOCYTES NFR BLD AUTO: 9 % — SIGNIFICANT CHANGE UP (ref 1.7–9.3)
MONOCYTES NFR BLD AUTO: 9.5 % — HIGH (ref 1.7–9.3)
MONOCYTES NFR BLD AUTO: 9.5 % — HIGH (ref 1.7–9.3)
MONOCYTES NFR BLD AUTO: 9.6 % — HIGH (ref 1.7–9.3)
MONOCYTES NFR BLD AUTO: 9.7 % — HIGH (ref 1.7–9.3)
MONOCYTES NFR BLD AUTO: 9.8 % — HIGH (ref 1.7–9.3)
MONOCYTES NFR BLD AUTO: 9.9 % — HIGH (ref 1.7–9.3)
MRSA PCR RESULT.: POSITIVE
MYELOCYTES NFR BLD: 0.9 % — HIGH (ref 0–0)
MYELOCYTES NFR BLD: 1.8 % — HIGH (ref 0–0)
NEUTROPHILS # BLD AUTO: 10.51 K/UL — HIGH (ref 1.4–6.5)
NEUTROPHILS # BLD AUTO: 12.77 K/UL — HIGH (ref 1.4–6.5)
NEUTROPHILS # BLD AUTO: 12.82 K/UL — HIGH (ref 1.4–6.5)
NEUTROPHILS # BLD AUTO: 13.44 K/UL — HIGH (ref 1.4–6.5)
NEUTROPHILS # BLD AUTO: 2.66 K/UL — SIGNIFICANT CHANGE UP (ref 1.4–6.5)
NEUTROPHILS # BLD AUTO: 3.18 K/UL — SIGNIFICANT CHANGE UP (ref 1.4–6.5)
NEUTROPHILS # BLD AUTO: 3.38 K/UL — SIGNIFICANT CHANGE UP (ref 1.4–6.5)
NEUTROPHILS # BLD AUTO: 3.49 K/UL — SIGNIFICANT CHANGE UP (ref 1.4–6.5)
NEUTROPHILS # BLD AUTO: 3.5 K/UL — SIGNIFICANT CHANGE UP (ref 1.4–6.5)
NEUTROPHILS # BLD AUTO: 3.56 K/UL — SIGNIFICANT CHANGE UP (ref 1.4–6.5)
NEUTROPHILS # BLD AUTO: 4.01 K/UL — SIGNIFICANT CHANGE UP (ref 1.4–6.5)
NEUTROPHILS # BLD AUTO: 4.02 K/UL — SIGNIFICANT CHANGE UP (ref 1.4–6.5)
NEUTROPHILS # BLD AUTO: 4.16 K/UL — SIGNIFICANT CHANGE UP (ref 1.4–6.5)
NEUTROPHILS # BLD AUTO: 4.19 K/UL — SIGNIFICANT CHANGE UP (ref 1.4–6.5)
NEUTROPHILS # BLD AUTO: 4.22 K/UL — SIGNIFICANT CHANGE UP (ref 1.4–6.5)
NEUTROPHILS # BLD AUTO: 4.32 K/UL — SIGNIFICANT CHANGE UP (ref 1.4–6.5)
NEUTROPHILS # BLD AUTO: 5.16 K/UL — SIGNIFICANT CHANGE UP (ref 1.4–6.5)
NEUTROPHILS # BLD AUTO: 5.26 K/UL — SIGNIFICANT CHANGE UP (ref 1.4–6.5)
NEUTROPHILS # BLD AUTO: 5.4 K/UL — SIGNIFICANT CHANGE UP (ref 1.4–6.5)
NEUTROPHILS # BLD AUTO: 5.75 K/UL — SIGNIFICANT CHANGE UP (ref 1.4–6.5)
NEUTROPHILS # BLD AUTO: 5.98 K/UL — SIGNIFICANT CHANGE UP (ref 1.4–6.5)
NEUTROPHILS # BLD AUTO: 6.2 K/UL — SIGNIFICANT CHANGE UP (ref 1.4–6.5)
NEUTROPHILS # BLD AUTO: 6.27 K/UL — SIGNIFICANT CHANGE UP (ref 1.4–6.5)
NEUTROPHILS # BLD AUTO: 6.28 K/UL — SIGNIFICANT CHANGE UP (ref 1.4–6.5)
NEUTROPHILS # BLD AUTO: 6.61 K/UL — HIGH (ref 1.4–6.5)
NEUTROPHILS # BLD AUTO: 7.18 K/UL — HIGH (ref 1.4–6.5)
NEUTROPHILS # BLD AUTO: 7.36 K/UL — HIGH (ref 1.4–6.5)
NEUTROPHILS # BLD AUTO: 7.86 K/UL — HIGH (ref 1.4–6.5)
NEUTROPHILS # BLD AUTO: 8.53 K/UL — HIGH (ref 1.4–6.5)
NEUTROPHILS # BLD AUTO: 8.58 K/UL — HIGH (ref 1.4–6.5)
NEUTROPHILS # BLD AUTO: 8.79 K/UL — HIGH (ref 1.4–6.5)
NEUTROPHILS # BLD AUTO: 8.88 K/UL — HIGH (ref 1.4–6.5)
NEUTROPHILS # BLD AUTO: 9.96 K/UL — HIGH (ref 1.4–6.5)
NEUTROPHILS NFR BLD AUTO: 49.6 % — SIGNIFICANT CHANGE UP (ref 42.2–75.2)
NEUTROPHILS NFR BLD AUTO: 60.9 % — SIGNIFICANT CHANGE UP (ref 42.2–75.2)
NEUTROPHILS NFR BLD AUTO: 62 % — SIGNIFICANT CHANGE UP (ref 42.2–75.2)
NEUTROPHILS NFR BLD AUTO: 64.2 % — SIGNIFICANT CHANGE UP (ref 42.2–75.2)
NEUTROPHILS NFR BLD AUTO: 69.5 % — SIGNIFICANT CHANGE UP (ref 42.2–75.2)
NEUTROPHILS NFR BLD AUTO: 71.8 % — SIGNIFICANT CHANGE UP (ref 42.2–75.2)
NEUTROPHILS NFR BLD AUTO: 71.9 % — SIGNIFICANT CHANGE UP (ref 42.2–75.2)
NEUTROPHILS NFR BLD AUTO: 72.6 % — SIGNIFICANT CHANGE UP (ref 42.2–75.2)
NEUTROPHILS NFR BLD AUTO: 72.8 % — SIGNIFICANT CHANGE UP (ref 42.2–75.2)
NEUTROPHILS NFR BLD AUTO: 73.1 % — SIGNIFICANT CHANGE UP (ref 42.2–75.2)
NEUTROPHILS NFR BLD AUTO: 73.3 % — SIGNIFICANT CHANGE UP (ref 42.2–75.2)
NEUTROPHILS NFR BLD AUTO: 74 % — SIGNIFICANT CHANGE UP (ref 42.2–75.2)
NEUTROPHILS NFR BLD AUTO: 74.2 % — SIGNIFICANT CHANGE UP (ref 42.2–75.2)
NEUTROPHILS NFR BLD AUTO: 74.6 % — SIGNIFICANT CHANGE UP (ref 42.2–75.2)
NEUTROPHILS NFR BLD AUTO: 74.8 % — SIGNIFICANT CHANGE UP (ref 42.2–75.2)
NEUTROPHILS NFR BLD AUTO: 75 % — SIGNIFICANT CHANGE UP (ref 42.2–75.2)
NEUTROPHILS NFR BLD AUTO: 75.6 % — HIGH (ref 42.2–75.2)
NEUTROPHILS NFR BLD AUTO: 76.6 % — HIGH (ref 42.2–75.2)
NEUTROPHILS NFR BLD AUTO: 76.6 % — HIGH (ref 42.2–75.2)
NEUTROPHILS NFR BLD AUTO: 77.4 % — HIGH (ref 42.2–75.2)
NEUTROPHILS NFR BLD AUTO: 77.5 % — HIGH (ref 42.2–75.2)
NEUTROPHILS NFR BLD AUTO: 78.4 % — HIGH (ref 42.2–75.2)
NEUTROPHILS NFR BLD AUTO: 79.3 % — HIGH (ref 42.2–75.2)
NEUTROPHILS NFR BLD AUTO: 79.6 % — HIGH (ref 42.2–75.2)
NEUTROPHILS NFR BLD AUTO: 80.7 % — HIGH (ref 42.2–75.2)
NEUTROPHILS NFR BLD AUTO: 80.7 % — HIGH (ref 42.2–75.2)
NEUTROPHILS NFR BLD AUTO: 81.5 % — HIGH (ref 42.2–75.2)
NEUTROPHILS NFR BLD AUTO: 81.7 % — HIGH (ref 42.2–75.2)
NEUTROPHILS NFR BLD AUTO: 82.8 % — HIGH (ref 42.2–75.2)
NEUTROPHILS NFR BLD AUTO: 83.6 % — HIGH (ref 42.2–75.2)
NEUTROPHILS NFR BLD AUTO: 83.7 % — HIGH (ref 42.2–75.2)
NEUTROPHILS NFR BLD AUTO: 84.2 % — HIGH (ref 42.2–75.2)
NEUTROPHILS NFR BLD AUTO: 85.2 % — HIGH (ref 42.2–75.2)
NEUTS BAND # BLD: 0.9 % — SIGNIFICANT CHANGE UP (ref 0–6)
NEUTS BAND # BLD: 2.6 % — SIGNIFICANT CHANGE UP (ref 0–6)
NITRITE UR-MCNC: NEGATIVE — SIGNIFICANT CHANGE UP
NITRITE UR-MCNC: NEGATIVE — SIGNIFICANT CHANGE UP
NRBC # BLD: 0 /100 WBCS — SIGNIFICANT CHANGE UP (ref 0–0)
PCO2 BLDA: 24 MMHG — LOW (ref 38–42)
PCO2 BLDA: 36 MMHG — LOW (ref 38–42)
PCO2 BLDA: 40 MMHG — SIGNIFICANT CHANGE UP (ref 38–42)
PCO2 BLDA: 43 MMHG — HIGH (ref 38–42)
PCO2 BLDA: 44 MMHG — HIGH (ref 38–42)
PCO2 BLDA: 46 MMHG — HIGH (ref 38–42)
PH BLDA: 7.22 — LOW (ref 7.38–7.42)
PH BLDA: 7.38 — SIGNIFICANT CHANGE UP (ref 7.38–7.42)
PH BLDA: 7.46 — HIGH (ref 7.38–7.42)
PH BLDA: 7.47 — HIGH (ref 7.38–7.42)
PH BLDA: 7.47 — HIGH (ref 7.38–7.42)
PH BLDA: 7.55 — HIGH (ref 7.38–7.42)
PH UR: 6 — SIGNIFICANT CHANGE UP (ref 5–8)
PH UR: 7 — SIGNIFICANT CHANGE UP (ref 5–8)
PHOSPHATE SERPL-MCNC: 2.1 MG/DL — SIGNIFICANT CHANGE UP (ref 2.1–4.9)
PHOSPHATE SERPL-MCNC: 2.1 MG/DL — SIGNIFICANT CHANGE UP (ref 2.1–4.9)
PHOSPHATE SERPL-MCNC: 2.2 MG/DL — SIGNIFICANT CHANGE UP (ref 2.1–4.9)
PHOSPHATE SERPL-MCNC: 2.4 MG/DL — SIGNIFICANT CHANGE UP (ref 2.1–4.9)
PHOSPHATE SERPL-MCNC: 2.4 MG/DL — SIGNIFICANT CHANGE UP (ref 2.1–4.9)
PHOSPHATE SERPL-MCNC: 3.2 MG/DL — SIGNIFICANT CHANGE UP (ref 2.1–4.9)
PHOSPHATE SERPL-MCNC: 3.3 MG/DL — SIGNIFICANT CHANGE UP (ref 2.1–4.9)
PHOSPHATE SERPL-MCNC: 3.3 MG/DL — SIGNIFICANT CHANGE UP (ref 2.1–4.9)
PHOSPHATE SERPL-MCNC: 4 MG/DL — SIGNIFICANT CHANGE UP (ref 2.1–4.9)
PHOSPHATE SERPL-MCNC: 4.2 MG/DL — SIGNIFICANT CHANGE UP (ref 2.1–4.9)
PHOSPHATE SERPL-MCNC: 4.3 MG/DL — SIGNIFICANT CHANGE UP (ref 2.1–4.9)
PHOSPHATE SERPL-MCNC: 5.2 MG/DL — HIGH (ref 2.1–4.9)
PHOSPHATE SERPL-MCNC: 5.4 MG/DL — HIGH (ref 2.1–4.9)
PHOSPHATE SERPL-MCNC: 5.7 MG/DL — HIGH (ref 2.1–4.9)
PHOSPHATE SERPL-MCNC: 6 MG/DL — HIGH (ref 2.1–4.9)
PLAT MORPH BLD: NORMAL — SIGNIFICANT CHANGE UP
PLATELET # BLD AUTO: 102 K/UL — LOW (ref 130–400)
PLATELET # BLD AUTO: 106 K/UL — LOW (ref 130–400)
PLATELET # BLD AUTO: 110 K/UL — LOW (ref 130–400)
PLATELET # BLD AUTO: 115 K/UL — LOW (ref 130–400)
PLATELET # BLD AUTO: 117 K/UL — LOW (ref 130–400)
PLATELET # BLD AUTO: 118 K/UL — LOW (ref 130–400)
PLATELET # BLD AUTO: 121 K/UL — LOW (ref 130–400)
PLATELET # BLD AUTO: 128 K/UL — LOW (ref 130–400)
PLATELET # BLD AUTO: 136 K/UL — SIGNIFICANT CHANGE UP (ref 130–400)
PLATELET # BLD AUTO: 141 K/UL — SIGNIFICANT CHANGE UP (ref 130–400)
PLATELET # BLD AUTO: 149 K/UL — SIGNIFICANT CHANGE UP (ref 130–400)
PLATELET # BLD AUTO: 155 K/UL — SIGNIFICANT CHANGE UP (ref 130–400)
PLATELET # BLD AUTO: 159 K/UL — SIGNIFICANT CHANGE UP (ref 130–400)
PLATELET # BLD AUTO: 159 K/UL — SIGNIFICANT CHANGE UP (ref 130–400)
PLATELET # BLD AUTO: 160 K/UL — SIGNIFICANT CHANGE UP (ref 130–400)
PLATELET # BLD AUTO: 179 K/UL — SIGNIFICANT CHANGE UP (ref 130–400)
PLATELET # BLD AUTO: 181 K/UL — SIGNIFICANT CHANGE UP (ref 130–400)
PLATELET # BLD AUTO: 187 K/UL — SIGNIFICANT CHANGE UP (ref 130–400)
PLATELET # BLD AUTO: 188 K/UL — SIGNIFICANT CHANGE UP (ref 130–400)
PLATELET # BLD AUTO: 191 K/UL — SIGNIFICANT CHANGE UP (ref 130–400)
PLATELET # BLD AUTO: 192 K/UL — SIGNIFICANT CHANGE UP (ref 130–400)
PLATELET # BLD AUTO: 194 K/UL — SIGNIFICANT CHANGE UP (ref 130–400)
PLATELET # BLD AUTO: 195 K/UL — SIGNIFICANT CHANGE UP (ref 130–400)
PLATELET # BLD AUTO: 208 K/UL — SIGNIFICANT CHANGE UP (ref 130–400)
PLATELET # BLD AUTO: 211 K/UL — SIGNIFICANT CHANGE UP (ref 130–400)
PLATELET # BLD AUTO: 212 K/UL — SIGNIFICANT CHANGE UP (ref 130–400)
PLATELET # BLD AUTO: 217 K/UL — SIGNIFICANT CHANGE UP (ref 130–400)
PLATELET # BLD AUTO: 223 K/UL — SIGNIFICANT CHANGE UP (ref 130–400)
PLATELET # BLD AUTO: 242 K/UL — SIGNIFICANT CHANGE UP (ref 130–400)
PLATELET # BLD AUTO: 248 K/UL — SIGNIFICANT CHANGE UP (ref 130–400)
PLATELET # BLD AUTO: 263 K/UL — SIGNIFICANT CHANGE UP (ref 130–400)
PLATELET # BLD AUTO: 268 K/UL — SIGNIFICANT CHANGE UP (ref 130–400)
PLATELET # BLD AUTO: 273 K/UL — SIGNIFICANT CHANGE UP (ref 130–400)
PLATELET # BLD AUTO: 278 K/UL — SIGNIFICANT CHANGE UP (ref 130–400)
PLATELET # BLD AUTO: 285 K/UL — SIGNIFICANT CHANGE UP (ref 130–400)
PLATELET # BLD AUTO: 285 K/UL — SIGNIFICANT CHANGE UP (ref 130–400)
PLATELET # BLD AUTO: 286 K/UL — SIGNIFICANT CHANGE UP (ref 130–400)
PLATELET # BLD AUTO: 303 K/UL — SIGNIFICANT CHANGE UP (ref 130–400)
PLATELET # BLD AUTO: 306 K/UL — SIGNIFICANT CHANGE UP (ref 130–400)
PLATELET # BLD AUTO: 308 K/UL — SIGNIFICANT CHANGE UP (ref 130–400)
PLATELET # BLD AUTO: 313 K/UL — SIGNIFICANT CHANGE UP (ref 130–400)
PLATELET # BLD AUTO: 321 K/UL — SIGNIFICANT CHANGE UP (ref 130–400)
PLATELET # BLD AUTO: 323 K/UL — SIGNIFICANT CHANGE UP (ref 130–400)
PLATELET # BLD AUTO: 334 K/UL — SIGNIFICANT CHANGE UP (ref 130–400)
PLATELET # BLD AUTO: 365 K/UL — SIGNIFICANT CHANGE UP (ref 130–400)
PO2 BLDA: 101 MMHG — HIGH (ref 78–95)
PO2 BLDA: 128 MMHG — HIGH (ref 78–95)
PO2 BLDA: 131 MMHG — HIGH (ref 78–95)
PO2 BLDA: 179 MMHG — HIGH (ref 78–95)
PO2 BLDA: 183 MMHG — HIGH (ref 78–95)
PO2 BLDA: 97 MMHG — HIGH (ref 78–95)
POIKILOCYTOSIS BLD QL AUTO: SIGNIFICANT CHANGE UP
POIKILOCYTOSIS BLD QL AUTO: SLIGHT — SIGNIFICANT CHANGE UP
POLYCHROMASIA BLD QL SMEAR: SIGNIFICANT CHANGE UP
POLYCHROMASIA BLD QL SMEAR: SLIGHT — SIGNIFICANT CHANGE UP
POLYCHROMASIA BLD QL SMEAR: SLIGHT — SIGNIFICANT CHANGE UP
POTASSIUM SERPL-MCNC: 3.4 MMOL/L — LOW (ref 3.5–5)
POTASSIUM SERPL-MCNC: 3.5 MMOL/L — SIGNIFICANT CHANGE UP (ref 3.5–5)
POTASSIUM SERPL-MCNC: 3.7 MMOL/L — SIGNIFICANT CHANGE UP (ref 3.5–5)
POTASSIUM SERPL-MCNC: 3.7 MMOL/L — SIGNIFICANT CHANGE UP (ref 3.5–5)
POTASSIUM SERPL-MCNC: 3.8 MMOL/L — SIGNIFICANT CHANGE UP (ref 3.5–5)
POTASSIUM SERPL-MCNC: 3.9 MMOL/L — SIGNIFICANT CHANGE UP (ref 3.5–5)
POTASSIUM SERPL-MCNC: 3.9 MMOL/L — SIGNIFICANT CHANGE UP (ref 3.5–5)
POTASSIUM SERPL-MCNC: 4 MMOL/L — SIGNIFICANT CHANGE UP (ref 3.5–5)
POTASSIUM SERPL-MCNC: 4.1 MMOL/L — SIGNIFICANT CHANGE UP (ref 3.5–5)
POTASSIUM SERPL-MCNC: 4.2 MMOL/L — SIGNIFICANT CHANGE UP (ref 3.5–5)
POTASSIUM SERPL-MCNC: 4.3 MMOL/L — SIGNIFICANT CHANGE UP (ref 3.5–5)
POTASSIUM SERPL-MCNC: 4.4 MMOL/L — SIGNIFICANT CHANGE UP (ref 3.5–5)
POTASSIUM SERPL-MCNC: 4.5 MMOL/L — SIGNIFICANT CHANGE UP (ref 3.5–5)
POTASSIUM SERPL-MCNC: 4.6 MMOL/L — SIGNIFICANT CHANGE UP (ref 3.5–5)
POTASSIUM SERPL-MCNC: 5.1 MMOL/L — HIGH (ref 3.5–5)
POTASSIUM SERPL-SCNC: 3.4 MMOL/L — LOW (ref 3.5–5)
POTASSIUM SERPL-SCNC: 3.5 MMOL/L — SIGNIFICANT CHANGE UP (ref 3.5–5)
POTASSIUM SERPL-SCNC: 3.7 MMOL/L — SIGNIFICANT CHANGE UP (ref 3.5–5)
POTASSIUM SERPL-SCNC: 3.7 MMOL/L — SIGNIFICANT CHANGE UP (ref 3.5–5)
POTASSIUM SERPL-SCNC: 3.8 MMOL/L — SIGNIFICANT CHANGE UP (ref 3.5–5)
POTASSIUM SERPL-SCNC: 3.9 MMOL/L — SIGNIFICANT CHANGE UP (ref 3.5–5)
POTASSIUM SERPL-SCNC: 3.9 MMOL/L — SIGNIFICANT CHANGE UP (ref 3.5–5)
POTASSIUM SERPL-SCNC: 4 MMOL/L — SIGNIFICANT CHANGE UP (ref 3.5–5)
POTASSIUM SERPL-SCNC: 4.1 MMOL/L — SIGNIFICANT CHANGE UP (ref 3.5–5)
POTASSIUM SERPL-SCNC: 4.2 MMOL/L — SIGNIFICANT CHANGE UP (ref 3.5–5)
POTASSIUM SERPL-SCNC: 4.3 MMOL/L — SIGNIFICANT CHANGE UP (ref 3.5–5)
POTASSIUM SERPL-SCNC: 4.4 MMOL/L — SIGNIFICANT CHANGE UP (ref 3.5–5)
POTASSIUM SERPL-SCNC: 4.5 MMOL/L — SIGNIFICANT CHANGE UP (ref 3.5–5)
POTASSIUM SERPL-SCNC: 4.6 MMOL/L — SIGNIFICANT CHANGE UP (ref 3.5–5)
POTASSIUM SERPL-SCNC: 5.1 MMOL/L — HIGH (ref 3.5–5)
PROCALCITONIN SERPL-MCNC: 1.72 NG/ML — HIGH (ref 0.02–0.1)
PROCALCITONIN SERPL-MCNC: 6.67 NG/ML — HIGH (ref 0.02–0.1)
PROLACTIN SERPL-MCNC: 51.2 NG/ML — HIGH (ref 3.4–24.1)
PROT SERPL-MCNC: 4.7 G/DL — LOW (ref 6–8)
PROT SERPL-MCNC: 4.8 G/DL — LOW (ref 6–8)
PROT SERPL-MCNC: 4.8 G/DL — LOW (ref 6–8)
PROT SERPL-MCNC: 5 G/DL — LOW (ref 6–8)
PROT SERPL-MCNC: 5.1 G/DL — LOW (ref 6–8)
PROT SERPL-MCNC: 5.2 G/DL — LOW (ref 6–8)
PROT SERPL-MCNC: 5.3 G/DL — LOW (ref 6–8)
PROT SERPL-MCNC: 5.3 G/DL — LOW (ref 6–8)
PROT SERPL-MCNC: 5.4 G/DL — LOW (ref 6–8)
PROT SERPL-MCNC: 5.5 G/DL — LOW (ref 6–8)
PROT SERPL-MCNC: 5.6 G/DL — LOW (ref 6–8)
PROT SERPL-MCNC: 5.9 G/DL — LOW (ref 6–8)
PROT SERPL-MCNC: 5.9 G/DL — LOW (ref 6–8)
PROT SERPL-MCNC: 6 G/DL — SIGNIFICANT CHANGE UP (ref 6–8)
PROT SERPL-MCNC: 6.4 G/DL — SIGNIFICANT CHANGE UP (ref 6–8)
PROT SERPL-MCNC: 6.5 G/DL — SIGNIFICANT CHANGE UP (ref 6–8)
PROT SERPL-MCNC: 6.8 G/DL — SIGNIFICANT CHANGE UP (ref 6–8)
PROT UR-MCNC: ABNORMAL
PROT UR-MCNC: NEGATIVE — SIGNIFICANT CHANGE UP
PROTHROM AB SERPL-ACNC: 12.8 SEC — SIGNIFICANT CHANGE UP (ref 9.95–12.87)
PROTHROM AB SERPL-ACNC: 13.1 SEC — HIGH (ref 9.95–12.87)
PROTHROM AB SERPL-ACNC: 13.5 SEC — HIGH (ref 9.95–12.87)
PROTHROM AB SERPL-ACNC: 13.5 SEC — HIGH (ref 9.95–12.87)
PROTHROM AB SERPL-ACNC: 13.8 SEC — HIGH (ref 9.95–12.87)
PROTHROM AB SERPL-ACNC: 14.1 SEC — HIGH (ref 9.95–12.87)
PROTHROM AB SERPL-ACNC: 14.7 SEC — HIGH (ref 9.95–12.87)
PROTHROM AB SERPL-ACNC: 14.7 SEC — HIGH (ref 9.95–12.87)
PROTHROM AB SERPL-ACNC: 14.8 SEC — HIGH (ref 9.95–12.87)
PROTHROM AB SERPL-ACNC: 15 SEC — HIGH (ref 9.95–12.87)
PROTHROM AB SERPL-ACNC: 15 SEC — HIGH (ref 9.95–12.87)
PROTHROM AB SERPL-ACNC: 15.1 SEC — HIGH (ref 9.95–12.87)
PROTHROM AB SERPL-ACNC: 15.7 SEC — HIGH (ref 9.95–12.87)
PROTHROM AB SERPL-ACNC: 15.8 SEC — HIGH (ref 9.95–12.87)
PROTHROM AB SERPL-ACNC: 16.2 SEC — HIGH (ref 9.95–12.87)
PROTHROM AB SERPL-ACNC: 16.5 SEC — HIGH (ref 9.95–12.87)
PROTHROM AB SERPL-ACNC: 16.8 SEC — HIGH (ref 9.95–12.87)
PROTHROM AB SERPL-ACNC: 17.7 SEC — HIGH (ref 9.95–12.87)
PROTHROM AB SERPL-ACNC: 20.1 SEC — HIGH (ref 9.95–12.87)
PROTHROM AB SERPL-ACNC: 20.5 SEC — HIGH (ref 9.95–12.87)
PROTHROM AB SERPL-ACNC: 20.6 SEC — HIGH (ref 9.95–12.87)
PROTHROM AB SERPL-ACNC: 25.1 SEC — HIGH (ref 9.95–12.87)
PROTHROM AB SERPL-ACNC: 29.2 SEC — HIGH (ref 9.95–12.87)
PROTHROM AB SERPL-ACNC: >40 SEC — HIGH (ref 9.95–12.87)
RAPID RVP RESULT: SIGNIFICANT CHANGE UP
RBC # BLD: 2.17 M/UL — LOW (ref 4.2–5.4)
RBC # BLD: 2.29 M/UL — LOW (ref 4.2–5.4)
RBC # BLD: 2.47 M/UL — LOW (ref 4.2–5.4)
RBC # BLD: 2.57 M/UL — LOW (ref 4.2–5.4)
RBC # BLD: 2.6 M/UL — LOW (ref 4.2–5.4)
RBC # BLD: 2.61 M/UL — LOW (ref 4.2–5.4)
RBC # BLD: 2.62 M/UL — LOW (ref 4.2–5.4)
RBC # BLD: 2.71 M/UL — LOW (ref 4.2–5.4)
RBC # BLD: 2.71 M/UL — LOW (ref 4.2–5.4)
RBC # BLD: 2.74 M/UL — LOW (ref 4.2–5.4)
RBC # BLD: 2.75 M/UL — LOW (ref 4.2–5.4)
RBC # BLD: 2.76 M/UL — LOW (ref 4.2–5.4)
RBC # BLD: 2.77 M/UL — LOW (ref 4.2–5.4)
RBC # BLD: 2.77 M/UL — LOW (ref 4.2–5.4)
RBC # BLD: 2.8 M/UL — LOW (ref 4.2–5.4)
RBC # BLD: 2.81 M/UL — LOW (ref 4.2–5.4)
RBC # BLD: 2.81 M/UL — LOW (ref 4.2–5.4)
RBC # BLD: 2.82 M/UL — LOW (ref 4.2–5.4)
RBC # BLD: 2.86 M/UL — LOW (ref 4.2–5.4)
RBC # BLD: 2.86 M/UL — LOW (ref 4.2–5.4)
RBC # BLD: 2.87 M/UL — LOW (ref 4.2–5.4)
RBC # BLD: 2.92 M/UL — LOW (ref 4.2–5.4)
RBC # BLD: 2.92 M/UL — LOW (ref 4.2–5.4)
RBC # BLD: 2.94 M/UL — LOW (ref 4.2–5.4)
RBC # BLD: 2.97 M/UL — LOW (ref 4.2–5.4)
RBC # BLD: 2.99 M/UL — LOW (ref 4.2–5.4)
RBC # BLD: 3 M/UL — LOW (ref 4.2–5.4)
RBC # BLD: 3.03 M/UL — LOW (ref 4.2–5.4)
RBC # BLD: 3.04 M/UL — LOW (ref 4.2–5.4)
RBC # BLD: 3.05 M/UL — LOW (ref 4.2–5.4)
RBC # BLD: 3.08 M/UL — LOW (ref 4.2–5.4)
RBC # BLD: 3.09 M/UL — LOW (ref 4.2–5.4)
RBC # BLD: 3.09 M/UL — LOW (ref 4.2–5.4)
RBC # BLD: 3.15 M/UL — LOW (ref 4.2–5.4)
RBC # BLD: 3.18 M/UL — LOW (ref 4.2–5.4)
RBC # BLD: 3.19 M/UL — LOW (ref 4.2–5.4)
RBC # BLD: 3.21 M/UL — LOW (ref 4.2–5.4)
RBC # BLD: 3.23 M/UL — LOW (ref 4.2–5.4)
RBC # BLD: 3.29 M/UL — LOW (ref 4.2–5.4)
RBC # BLD: 3.33 M/UL — LOW (ref 4.2–5.4)
RBC # BLD: 3.49 M/UL — LOW (ref 4.2–5.4)
RBC # FLD: 14.6 % — HIGH (ref 11.5–14.5)
RBC # FLD: 14.7 % — HIGH (ref 11.5–14.5)
RBC # FLD: 14.8 % — HIGH (ref 11.5–14.5)
RBC # FLD: 14.9 % — HIGH (ref 11.5–14.5)
RBC # FLD: 15.1 % — HIGH (ref 11.5–14.5)
RBC # FLD: 15.1 % — HIGH (ref 11.5–14.5)
RBC # FLD: 15.2 % — HIGH (ref 11.5–14.5)
RBC # FLD: 15.3 % — HIGH (ref 11.5–14.5)
RBC # FLD: 15.5 % — HIGH (ref 11.5–14.5)
RBC # FLD: 15.6 % — HIGH (ref 11.5–14.5)
RBC # FLD: 15.7 % — HIGH (ref 11.5–14.5)
RBC # FLD: 15.7 % — HIGH (ref 11.5–14.5)
RBC # FLD: 15.8 % — HIGH (ref 11.5–14.5)
RBC # FLD: 15.9 % — HIGH (ref 11.5–14.5)
RBC # FLD: 16 % — HIGH (ref 11.5–14.5)
RBC # FLD: 16.3 % — HIGH (ref 11.5–14.5)
RBC # FLD: 16.5 % — HIGH (ref 11.5–14.5)
RBC # FLD: 17.4 % — HIGH (ref 11.5–14.5)
RBC # FLD: 17.6 % — HIGH (ref 11.5–14.5)
RBC # FLD: 17.8 % — HIGH (ref 11.5–14.5)
RBC # FLD: 17.8 % — HIGH (ref 11.5–14.5)
RBC # FLD: 17.9 % — HIGH (ref 11.5–14.5)
RBC # FLD: 17.9 % — HIGH (ref 11.5–14.5)
RBC # FLD: 18 % — HIGH (ref 11.5–14.5)
RBC # FLD: 18.1 % — HIGH (ref 11.5–14.5)
RBC # FLD: 18.1 % — HIGH (ref 11.5–14.5)
RBC # FLD: 18.3 % — HIGH (ref 11.5–14.5)
RBC # FLD: 18.5 % — HIGH (ref 11.5–14.5)
RBC # FLD: 18.6 % — HIGH (ref 11.5–14.5)
RBC # FLD: 18.6 % — HIGH (ref 11.5–14.5)
RBC BLD AUTO: ABNORMAL
RBC BLD AUTO: ABNORMAL
RBC BLD AUTO: NORMAL — SIGNIFICANT CHANGE UP
RBC CASTS # UR COMP ASSIST: 14 /HPF — HIGH (ref 0–4)
RBC CASTS # UR COMP ASSIST: 2 /HPF — SIGNIFICANT CHANGE UP (ref 0–4)
SAO2 % BLDA: 100 % — HIGH (ref 94–98)
SAO2 % BLDA: 100 % — HIGH (ref 94–98)
SAO2 % BLDA: 97 % — SIGNIFICANT CHANGE UP (ref 94–98)
SAO2 % BLDA: 98 % — SIGNIFICANT CHANGE UP (ref 94–98)
SAO2 % BLDA: 99 % — HIGH (ref 94–98)
SARS-COV-2 IGG SERPL QL IA: NEGATIVE — SIGNIFICANT CHANGE UP
SARS-COV-2 IGG+IGM SERPL QL IA: 107 AU/ML — HIGH
SARS-COV-2 IGG+IGM SERPL QL IA: POSITIVE
SARS-COV-2 IGM SERPL IA-ACNC: 0.05 INDEX — SIGNIFICANT CHANGE UP
SARS-COV-2 RNA SPEC QL NAA+PROBE: SIGNIFICANT CHANGE UP
SMUDGE CELLS # BLD: PRESENT — SIGNIFICANT CHANGE UP
SMUDGE CELLS # BLD: PRESENT — SIGNIFICANT CHANGE UP
SODIUM SERPL-SCNC: 132 MMOL/L — LOW (ref 135–146)
SODIUM SERPL-SCNC: 135 MMOL/L — SIGNIFICANT CHANGE UP (ref 135–146)
SODIUM SERPL-SCNC: 136 MMOL/L — SIGNIFICANT CHANGE UP (ref 135–146)
SODIUM SERPL-SCNC: 136 MMOL/L — SIGNIFICANT CHANGE UP (ref 135–146)
SODIUM SERPL-SCNC: 137 MMOL/L — SIGNIFICANT CHANGE UP (ref 135–146)
SODIUM SERPL-SCNC: 138 MMOL/L — SIGNIFICANT CHANGE UP (ref 135–146)
SODIUM SERPL-SCNC: 139 MMOL/L — SIGNIFICANT CHANGE UP (ref 135–146)
SODIUM SERPL-SCNC: 140 MMOL/L — SIGNIFICANT CHANGE UP (ref 135–146)
SODIUM SERPL-SCNC: 142 MMOL/L — SIGNIFICANT CHANGE UP (ref 135–146)
SODIUM SERPL-SCNC: 142 MMOL/L — SIGNIFICANT CHANGE UP (ref 135–146)
SODIUM SERPL-SCNC: 143 MMOL/L — SIGNIFICANT CHANGE UP (ref 135–146)
SODIUM SERPL-SCNC: 144 MMOL/L — SIGNIFICANT CHANGE UP (ref 135–146)
SODIUM SERPL-SCNC: 144 MMOL/L — SIGNIFICANT CHANGE UP (ref 135–146)
SODIUM SERPL-SCNC: 145 MMOL/L — SIGNIFICANT CHANGE UP (ref 135–146)
SODIUM SERPL-SCNC: 145 MMOL/L — SIGNIFICANT CHANGE UP (ref 135–146)
SODIUM SERPL-SCNC: 147 MMOL/L — HIGH (ref 135–146)
SODIUM SERPL-SCNC: 147 MMOL/L — HIGH (ref 135–146)
SP GR SPEC: 1.01 — SIGNIFICANT CHANGE UP (ref 1.01–1.03)
SP GR SPEC: 1.03 — SIGNIFICANT CHANGE UP (ref 1.01–1.03)
SPECIMEN SOURCE: SIGNIFICANT CHANGE UP
TROPONIN T SERPL-MCNC: 0.17 NG/ML — CRITICAL HIGH
TROPONIN T SERPL-MCNC: 0.21 NG/ML — CRITICAL HIGH
TROPONIN T SERPL-MCNC: 0.22 NG/ML — CRITICAL HIGH
TROPONIN T SERPL-MCNC: 0.22 NG/ML — CRITICAL HIGH
TROPONIN T SERPL-MCNC: 0.24 NG/ML — CRITICAL HIGH
TROPONIN T SERPL-MCNC: 0.27 NG/ML — CRITICAL HIGH
TROPONIN T SERPL-MCNC: 0.33 NG/ML — CRITICAL HIGH
TROPONIN T SERPL-MCNC: 0.34 NG/ML — CRITICAL HIGH
TSH SERPL-MCNC: 1.71 UIU/ML — SIGNIFICANT CHANGE UP (ref 0.27–4.2)
UROBILINOGEN FLD QL: SIGNIFICANT CHANGE UP
UROBILINOGEN FLD QL: SIGNIFICANT CHANGE UP
VARIANT LYMPHS # BLD: 1.7 % — SIGNIFICANT CHANGE UP (ref 0–5)
WBC # BLD: 10.11 K/UL — SIGNIFICANT CHANGE UP (ref 4.8–10.8)
WBC # BLD: 10.55 K/UL — SIGNIFICANT CHANGE UP (ref 4.8–10.8)
WBC # BLD: 10.7 K/UL — SIGNIFICANT CHANGE UP (ref 4.8–10.8)
WBC # BLD: 10.81 K/UL — HIGH (ref 4.8–10.8)
WBC # BLD: 11 K/UL — HIGH (ref 4.8–10.8)
WBC # BLD: 11.2 K/UL — HIGH (ref 4.8–10.8)
WBC # BLD: 11.25 K/UL — HIGH (ref 4.8–10.8)
WBC # BLD: 11.26 K/UL — HIGH (ref 4.8–10.8)
WBC # BLD: 11.27 K/UL — HIGH (ref 4.8–10.8)
WBC # BLD: 11.34 K/UL — HIGH (ref 4.8–10.8)
WBC # BLD: 11.62 K/UL — HIGH (ref 4.8–10.8)
WBC # BLD: 12.65 K/UL — HIGH (ref 4.8–10.8)
WBC # BLD: 13.03 K/UL — HIGH (ref 4.8–10.8)
WBC # BLD: 15.48 K/UL — HIGH (ref 4.8–10.8)
WBC # BLD: 16.05 K/UL — HIGH (ref 4.8–10.8)
WBC # BLD: 17.17 K/UL — HIGH (ref 4.8–10.8)
WBC # BLD: 17.85 K/UL — HIGH (ref 4.8–10.8)
WBC # BLD: 4.15 K/UL — LOW (ref 4.8–10.8)
WBC # BLD: 4.28 K/UL — LOW (ref 4.8–10.8)
WBC # BLD: 4.3 K/UL — LOW (ref 4.8–10.8)
WBC # BLD: 4.51 K/UL — LOW (ref 4.8–10.8)
WBC # BLD: 4.6 K/UL — LOW (ref 4.8–10.8)
WBC # BLD: 4.72 K/UL — LOW (ref 4.8–10.8)
WBC # BLD: 4.96 K/UL — SIGNIFICANT CHANGE UP (ref 4.8–10.8)
WBC # BLD: 5.45 K/UL — SIGNIFICANT CHANGE UP (ref 4.8–10.8)
WBC # BLD: 5.48 K/UL — SIGNIFICANT CHANGE UP (ref 4.8–10.8)
WBC # BLD: 5.64 K/UL — SIGNIFICANT CHANGE UP (ref 4.8–10.8)
WBC # BLD: 5.74 K/UL — SIGNIFICANT CHANGE UP (ref 4.8–10.8)
WBC # BLD: 5.78 K/UL — SIGNIFICANT CHANGE UP (ref 4.8–10.8)
WBC # BLD: 5.78 K/UL — SIGNIFICANT CHANGE UP (ref 4.8–10.8)
WBC # BLD: 5.79 K/UL — SIGNIFICANT CHANGE UP (ref 4.8–10.8)
WBC # BLD: 6.81 K/UL — SIGNIFICANT CHANGE UP (ref 4.8–10.8)
WBC # BLD: 7.04 K/UL — SIGNIFICANT CHANGE UP (ref 4.8–10.8)
WBC # BLD: 7.05 K/UL — SIGNIFICANT CHANGE UP (ref 4.8–10.8)
WBC # BLD: 7.41 K/UL — SIGNIFICANT CHANGE UP (ref 4.8–10.8)
WBC # BLD: 7.41 K/UL — SIGNIFICANT CHANGE UP (ref 4.8–10.8)
WBC # BLD: 7.58 K/UL — SIGNIFICANT CHANGE UP (ref 4.8–10.8)
WBC # BLD: 7.59 K/UL — SIGNIFICANT CHANGE UP (ref 4.8–10.8)
WBC # BLD: 7.82 K/UL — SIGNIFICANT CHANGE UP (ref 4.8–10.8)
WBC # BLD: 8.02 K/UL — SIGNIFICANT CHANGE UP (ref 4.8–10.8)
WBC # BLD: 8.48 K/UL — SIGNIFICANT CHANGE UP (ref 4.8–10.8)
WBC # BLD: 8.59 K/UL — SIGNIFICANT CHANGE UP (ref 4.8–10.8)
WBC # BLD: 8.63 K/UL — SIGNIFICANT CHANGE UP (ref 4.8–10.8)
WBC # BLD: 8.92 K/UL — SIGNIFICANT CHANGE UP (ref 4.8–10.8)
WBC # BLD: 9.7 K/UL — SIGNIFICANT CHANGE UP (ref 4.8–10.8)
WBC # FLD AUTO: 10.11 K/UL — SIGNIFICANT CHANGE UP (ref 4.8–10.8)
WBC # FLD AUTO: 10.55 K/UL — SIGNIFICANT CHANGE UP (ref 4.8–10.8)
WBC # FLD AUTO: 10.7 K/UL — SIGNIFICANT CHANGE UP (ref 4.8–10.8)
WBC # FLD AUTO: 10.81 K/UL — HIGH (ref 4.8–10.8)
WBC # FLD AUTO: 11 K/UL — HIGH (ref 4.8–10.8)
WBC # FLD AUTO: 11.2 K/UL — HIGH (ref 4.8–10.8)
WBC # FLD AUTO: 11.25 K/UL — HIGH (ref 4.8–10.8)
WBC # FLD AUTO: 11.26 K/UL — HIGH (ref 4.8–10.8)
WBC # FLD AUTO: 11.27 K/UL — HIGH (ref 4.8–10.8)
WBC # FLD AUTO: 11.34 K/UL — HIGH (ref 4.8–10.8)
WBC # FLD AUTO: 11.62 K/UL — HIGH (ref 4.8–10.8)
WBC # FLD AUTO: 12.65 K/UL — HIGH (ref 4.8–10.8)
WBC # FLD AUTO: 13.03 K/UL — HIGH (ref 4.8–10.8)
WBC # FLD AUTO: 15.48 K/UL — HIGH (ref 4.8–10.8)
WBC # FLD AUTO: 16.05 K/UL — HIGH (ref 4.8–10.8)
WBC # FLD AUTO: 17.17 K/UL — HIGH (ref 4.8–10.8)
WBC # FLD AUTO: 17.85 K/UL — HIGH (ref 4.8–10.8)
WBC # FLD AUTO: 4.15 K/UL — LOW (ref 4.8–10.8)
WBC # FLD AUTO: 4.28 K/UL — LOW (ref 4.8–10.8)
WBC # FLD AUTO: 4.3 K/UL — LOW (ref 4.8–10.8)
WBC # FLD AUTO: 4.51 K/UL — LOW (ref 4.8–10.8)
WBC # FLD AUTO: 4.6 K/UL — LOW (ref 4.8–10.8)
WBC # FLD AUTO: 4.72 K/UL — LOW (ref 4.8–10.8)
WBC # FLD AUTO: 4.96 K/UL — SIGNIFICANT CHANGE UP (ref 4.8–10.8)
WBC # FLD AUTO: 5.45 K/UL — SIGNIFICANT CHANGE UP (ref 4.8–10.8)
WBC # FLD AUTO: 5.48 K/UL — SIGNIFICANT CHANGE UP (ref 4.8–10.8)
WBC # FLD AUTO: 5.64 K/UL — SIGNIFICANT CHANGE UP (ref 4.8–10.8)
WBC # FLD AUTO: 5.74 K/UL — SIGNIFICANT CHANGE UP (ref 4.8–10.8)
WBC # FLD AUTO: 5.78 K/UL — SIGNIFICANT CHANGE UP (ref 4.8–10.8)
WBC # FLD AUTO: 5.78 K/UL — SIGNIFICANT CHANGE UP (ref 4.8–10.8)
WBC # FLD AUTO: 5.79 K/UL — SIGNIFICANT CHANGE UP (ref 4.8–10.8)
WBC # FLD AUTO: 6.81 K/UL — SIGNIFICANT CHANGE UP (ref 4.8–10.8)
WBC # FLD AUTO: 7.04 K/UL — SIGNIFICANT CHANGE UP (ref 4.8–10.8)
WBC # FLD AUTO: 7.05 K/UL — SIGNIFICANT CHANGE UP (ref 4.8–10.8)
WBC # FLD AUTO: 7.41 K/UL — SIGNIFICANT CHANGE UP (ref 4.8–10.8)
WBC # FLD AUTO: 7.41 K/UL — SIGNIFICANT CHANGE UP (ref 4.8–10.8)
WBC # FLD AUTO: 7.58 K/UL — SIGNIFICANT CHANGE UP (ref 4.8–10.8)
WBC # FLD AUTO: 7.59 K/UL — SIGNIFICANT CHANGE UP (ref 4.8–10.8)
WBC # FLD AUTO: 7.82 K/UL — SIGNIFICANT CHANGE UP (ref 4.8–10.8)
WBC # FLD AUTO: 8.02 K/UL — SIGNIFICANT CHANGE UP (ref 4.8–10.8)
WBC # FLD AUTO: 8.48 K/UL — SIGNIFICANT CHANGE UP (ref 4.8–10.8)
WBC # FLD AUTO: 8.59 K/UL — SIGNIFICANT CHANGE UP (ref 4.8–10.8)
WBC # FLD AUTO: 8.63 K/UL — SIGNIFICANT CHANGE UP (ref 4.8–10.8)
WBC # FLD AUTO: 8.92 K/UL — SIGNIFICANT CHANGE UP (ref 4.8–10.8)
WBC # FLD AUTO: 9.7 K/UL — SIGNIFICANT CHANGE UP (ref 4.8–10.8)
WBC UR QL: 8 /HPF — HIGH (ref 0–5)
WBC UR QL: >720 /HPF — HIGH (ref 0–5)

## 2021-01-01 PROCEDURE — 33249 INSJ/RPLCMT DEFIB W/LEAD(S): CPT

## 2021-01-01 PROCEDURE — 93306 TTE W/DOPPLER COMPLETE: CPT | Mod: 26

## 2021-01-01 PROCEDURE — 73590 X-RAY EXAM OF LOWER LEG: CPT | Mod: 26,RT

## 2021-01-01 PROCEDURE — 71045 X-RAY EXAM CHEST 1 VIEW: CPT | Mod: 26

## 2021-01-01 PROCEDURE — 71045 X-RAY EXAM CHEST 1 VIEW: CPT | Mod: 26,76

## 2021-01-01 PROCEDURE — 93010 ELECTROCARDIOGRAM REPORT: CPT

## 2021-01-01 PROCEDURE — 99292 CRITICAL CARE ADDL 30 MIN: CPT

## 2021-01-01 PROCEDURE — 74176 CT ABD & PELVIS W/O CONTRAST: CPT | Mod: 26

## 2021-01-01 PROCEDURE — 93010 ELECTROCARDIOGRAM REPORT: CPT | Mod: 77

## 2021-01-01 PROCEDURE — 71275 CT ANGIOGRAPHY CHEST: CPT | Mod: 26,MH

## 2021-01-01 PROCEDURE — 74177 CT ABD & PELVIS W/CONTRAST: CPT | Mod: 26,MH

## 2021-01-01 PROCEDURE — 36620 INSERTION CATHETER ARTERY: CPT | Mod: GC

## 2021-01-01 PROCEDURE — 74018 RADEX ABDOMEN 1 VIEW: CPT | Mod: 26

## 2021-01-01 PROCEDURE — 99233 SBSQ HOSP IP/OBS HIGH 50: CPT

## 2021-01-01 PROCEDURE — 99232 SBSQ HOSP IP/OBS MODERATE 35: CPT

## 2021-01-01 PROCEDURE — 99291 CRITICAL CARE FIRST HOUR: CPT

## 2021-01-01 PROCEDURE — 99222 1ST HOSP IP/OBS MODERATE 55: CPT

## 2021-01-01 PROCEDURE — 73130 X-RAY EXAM OF HAND: CPT | Mod: 26,RT

## 2021-01-01 PROCEDURE — 93970 EXTREMITY STUDY: CPT | Mod: 26

## 2021-01-01 PROCEDURE — 31500 INSERT EMERGENCY AIRWAY: CPT | Mod: GC

## 2021-01-01 PROCEDURE — 93971 EXTREMITY STUDY: CPT | Mod: 26,LT

## 2021-01-01 PROCEDURE — 73552 X-RAY EXAM OF FEMUR 2/>: CPT | Mod: 26,RT

## 2021-01-01 PROCEDURE — 99291 CRITICAL CARE FIRST HOUR: CPT | Mod: 25,GC

## 2021-01-01 PROCEDURE — 99443: CPT | Mod: 95

## 2021-01-01 PROCEDURE — 73030 X-RAY EXAM OF SHOULDER: CPT | Mod: 26,RT

## 2021-01-01 PROCEDURE — 99221 1ST HOSP IP/OBS SF/LOW 40: CPT

## 2021-01-01 PROCEDURE — 73080 X-RAY EXAM OF ELBOW: CPT | Mod: 26,RT

## 2021-01-01 PROCEDURE — 72170 X-RAY EXAM OF PELVIS: CPT | Mod: 26

## 2021-01-01 PROCEDURE — 95720 EEG PHY/QHP EA INCR W/VEEG: CPT

## 2021-01-01 PROCEDURE — 33225 L VENTRIC PACING LEAD ADD-ON: CPT

## 2021-01-01 PROCEDURE — 73060 X-RAY EXAM OF HUMERUS: CPT | Mod: 26,RT

## 2021-01-01 PROCEDURE — 93925 LOWER EXTREMITY STUDY: CPT | Mod: 26

## 2021-01-01 PROCEDURE — 71045 X-RAY EXAM CHEST 1 VIEW: CPT | Mod: 26,77

## 2021-01-01 PROCEDURE — 99285 EMERGENCY DEPT VISIT HI MDM: CPT | Mod: GC

## 2021-01-01 PROCEDURE — 70450 CT HEAD/BRAIN W/O DYE: CPT | Mod: 26

## 2021-01-01 PROCEDURE — 99221 1ST HOSP IP/OBS SF/LOW 40: CPT | Mod: 57

## 2021-01-01 PROCEDURE — 72125 CT NECK SPINE W/O DYE: CPT | Mod: 26,MH

## 2021-01-01 PROCEDURE — 70450 CT HEAD/BRAIN W/O DYE: CPT | Mod: 26,MH

## 2021-01-01 PROCEDURE — 73020 X-RAY EXAM OF SHOULDER: CPT | Mod: 26,RT

## 2021-01-01 PROCEDURE — 99283 EMERGENCY DEPT VISIT LOW MDM: CPT

## 2021-01-01 PROCEDURE — 73560 X-RAY EXAM OF KNEE 1 OR 2: CPT | Mod: 26,RT

## 2021-01-01 PROCEDURE — 36556 INSERT NON-TUNNEL CV CATH: CPT | Mod: RT,GC

## 2021-01-01 PROCEDURE — 36556 INSERT NON-TUNNEL CV CATH: CPT | Mod: GC

## 2021-01-01 PROCEDURE — 93010 ELECTROCARDIOGRAM REPORT: CPT | Mod: 76

## 2021-01-01 PROCEDURE — 73630 X-RAY EXAM OF FOOT: CPT | Mod: 26,RT

## 2021-01-01 PROCEDURE — 73610 X-RAY EXAM OF ANKLE: CPT | Mod: 26,RT

## 2021-01-01 PROCEDURE — 99291 CRITICAL CARE FIRST HOUR: CPT | Mod: 25

## 2021-01-01 PROCEDURE — 72193 CT PELVIS W/DYE: CPT | Mod: 26

## 2021-01-01 PROCEDURE — 73090 X-RAY EXAM OF FOREARM: CPT | Mod: 26,RT

## 2021-01-01 RX ORDER — FENTANYL CITRATE 50 UG/ML
1 INJECTION INTRAVENOUS
Refills: 0 | Status: DISCONTINUED | OUTPATIENT
Start: 2021-01-01 | End: 2021-01-01

## 2021-01-01 RX ORDER — AMIODARONE HYDROCHLORIDE 400 MG/1
200 TABLET ORAL
Refills: 0 | Status: DISCONTINUED | OUTPATIENT
Start: 2021-01-01 | End: 2021-01-01

## 2021-01-01 RX ORDER — MORPHINE SULFATE 50 MG/1
6 CAPSULE, EXTENDED RELEASE ORAL ONCE
Refills: 0 | Status: DISCONTINUED | OUTPATIENT
Start: 2021-01-01 | End: 2021-01-01

## 2021-01-01 RX ORDER — HEPARIN SODIUM 5000 [USP'U]/ML
1100 INJECTION INTRAVENOUS; SUBCUTANEOUS
Qty: 25000 | Refills: 0 | Status: DISCONTINUED | OUTPATIENT
Start: 2021-01-01 | End: 2021-01-01

## 2021-01-01 RX ORDER — DIVALPROEX SODIUM 500 MG/1
250 TABLET, DELAYED RELEASE ORAL
Refills: 0 | Status: DISCONTINUED | OUTPATIENT
Start: 2021-01-01 | End: 2021-01-01

## 2021-01-01 RX ORDER — VALPROIC ACID (AS SODIUM SALT) 250 MG/5ML
250 SOLUTION, ORAL ORAL
Refills: 0 | Status: DISCONTINUED | OUTPATIENT
Start: 2021-01-01 | End: 2021-01-01

## 2021-01-01 RX ORDER — SERTRALINE 25 MG/1
50 TABLET, FILM COATED ORAL DAILY
Refills: 0 | Status: DISCONTINUED | OUTPATIENT
Start: 2021-01-01 | End: 2021-01-01

## 2021-01-01 RX ORDER — ONDANSETRON 8 MG/1
4 TABLET, FILM COATED ORAL ONCE
Refills: 0 | Status: DISCONTINUED | OUTPATIENT
Start: 2021-01-01 | End: 2021-01-01

## 2021-01-01 RX ORDER — POTASSIUM CHLORIDE 20 MEQ
20 PACKET (EA) ORAL ONCE
Refills: 0 | Status: COMPLETED | OUTPATIENT
Start: 2021-01-01 | End: 2021-01-01

## 2021-01-01 RX ORDER — LEVETIRACETAM 250 MG/1
250 TABLET, FILM COATED ORAL
Refills: 0 | Status: DISCONTINUED | OUTPATIENT
Start: 2021-01-01 | End: 2021-01-01

## 2021-01-01 RX ORDER — PROPOFOL 10 MG/ML
10 INJECTION, EMULSION INTRAVENOUS
Qty: 500 | Refills: 0 | Status: DISCONTINUED | OUTPATIENT
Start: 2021-01-01 | End: 2021-01-01

## 2021-01-01 RX ORDER — BACITRACIN ZINC 500 UNIT/G
1 OINTMENT IN PACKET (EA) TOPICAL
Refills: 0 | Status: DISCONTINUED | OUTPATIENT
Start: 2021-01-01 | End: 2021-01-01

## 2021-01-01 RX ORDER — HYDROMORPHONE HYDROCHLORIDE 2 MG/ML
0.5 INJECTION INTRAMUSCULAR; INTRAVENOUS; SUBCUTANEOUS
Refills: 0 | Status: DISCONTINUED | OUTPATIENT
Start: 2021-01-01 | End: 2021-01-01

## 2021-01-01 RX ORDER — ESTROGENS, CONJUGATED 0.625 MG/G
0 CREAM WITH APPLICATOR VAGINAL
Qty: 0 | Refills: 0 | DISCHARGE
Start: 2021-01-01

## 2021-01-01 RX ORDER — PIPERACILLIN AND TAZOBACTAM 4; .5 G/20ML; G/20ML
3.38 INJECTION, POWDER, LYOPHILIZED, FOR SOLUTION INTRAVENOUS ONCE
Refills: 0 | Status: COMPLETED | OUTPATIENT
Start: 2021-01-01 | End: 2021-01-01

## 2021-01-01 RX ORDER — POLYETHYLENE GLYCOL 3350 17 G/17G
17 POWDER, FOR SOLUTION ORAL AT BEDTIME
Refills: 0 | Status: DISCONTINUED | OUTPATIENT
Start: 2021-01-01 | End: 2021-01-01

## 2021-01-01 RX ORDER — SERTRALINE 25 MG/1
0 TABLET, FILM COATED ORAL
Qty: 0 | Refills: 0 | DISCHARGE

## 2021-01-01 RX ORDER — BACITRACIN ZINC 500 UNIT/G
1 OINTMENT IN PACKET (EA) TOPICAL EVERY 12 HOURS
Refills: 0 | Status: DISCONTINUED | OUTPATIENT
Start: 2021-01-01 | End: 2021-01-01

## 2021-01-01 RX ORDER — TEMAZEPAM 15 MG/1
15 CAPSULE ORAL AT BEDTIME
Refills: 0 | Status: DISCONTINUED | OUTPATIENT
Start: 2021-01-01 | End: 2021-01-01

## 2021-01-01 RX ORDER — HEPARIN SODIUM 5000 [USP'U]/ML
1000 INJECTION INTRAVENOUS; SUBCUTANEOUS
Qty: 25000 | Refills: 0 | Status: DISCONTINUED | OUTPATIENT
Start: 2021-01-01 | End: 2021-01-01

## 2021-01-01 RX ORDER — DEXTROSE 50 % IN WATER 50 %
25 SYRINGE (ML) INTRAVENOUS ONCE
Refills: 0 | Status: DISCONTINUED | OUTPATIENT
Start: 2021-01-01 | End: 2021-01-01

## 2021-01-01 RX ORDER — DIVALPROEX SODIUM 500 MG/1
250 TABLET, DELAYED RELEASE ORAL EVERY 12 HOURS
Refills: 0 | Status: DISCONTINUED | OUTPATIENT
Start: 2021-01-01 | End: 2021-01-01

## 2021-01-01 RX ORDER — ROCURONIUM BROMIDE 10 MG/ML
100 VIAL (ML) INTRAVENOUS ONCE
Refills: 0 | Status: COMPLETED | OUTPATIENT
Start: 2021-01-01 | End: 2021-01-01

## 2021-01-01 RX ORDER — AMIODARONE HYDROCHLORIDE 400 MG/1
0 TABLET ORAL
Qty: 0 | Refills: 0 | DISCHARGE

## 2021-01-01 RX ORDER — MORPHINE SULFATE 50 MG/1
2 CAPSULE, EXTENDED RELEASE ORAL ONCE
Refills: 0 | Status: DISCONTINUED | OUTPATIENT
Start: 2021-01-01 | End: 2021-01-01

## 2021-01-01 RX ORDER — ATORVASTATIN CALCIUM 80 MG/1
40 TABLET, FILM COATED ORAL DAILY
Refills: 0 | Status: DISCONTINUED | OUTPATIENT
Start: 2021-01-01 | End: 2021-01-01

## 2021-01-01 RX ORDER — DIGOXIN 250 MCG
125 TABLET ORAL ONCE
Refills: 0 | Status: COMPLETED | OUTPATIENT
Start: 2021-01-01 | End: 2021-01-01

## 2021-01-01 RX ORDER — CALCIUM CHLORIDE
1000 POWDER (GRAM) MISCELLANEOUS ONCE
Refills: 0 | Status: COMPLETED | OUTPATIENT
Start: 2021-01-01 | End: 2021-01-01

## 2021-01-01 RX ORDER — PANTOPRAZOLE SODIUM 20 MG/1
40 TABLET, DELAYED RELEASE ORAL DAILY
Refills: 0 | Status: DISCONTINUED | OUTPATIENT
Start: 2021-01-01 | End: 2021-01-01

## 2021-01-01 RX ORDER — METOPROLOL TARTRATE 50 MG
5 TABLET ORAL ONCE
Refills: 0 | Status: COMPLETED | OUTPATIENT
Start: 2021-01-01 | End: 2021-01-01

## 2021-01-01 RX ORDER — HYDROMORPHONE HYDROCHLORIDE 2 MG/ML
0.5 INJECTION INTRAMUSCULAR; INTRAVENOUS; SUBCUTANEOUS EVERY 4 HOURS
Refills: 0 | Status: DISCONTINUED | OUTPATIENT
Start: 2021-01-01 | End: 2021-01-01

## 2021-01-01 RX ORDER — LEVETIRACETAM 250 MG/1
25 TABLET, FILM COATED ORAL
Qty: 0 | Refills: 0 | DISCHARGE
Start: 2021-01-01

## 2021-01-01 RX ORDER — PHENYLEPHRINE HYDROCHLORIDE 10 MG/ML
60 INJECTION INTRAVENOUS ONCE
Refills: 0 | Status: COMPLETED | OUTPATIENT
Start: 2021-01-01 | End: 2021-01-01

## 2021-01-01 RX ORDER — NOREPINEPHRINE BITARTRATE/D5W 8 MG/250ML
0.05 PLASTIC BAG, INJECTION (ML) INTRAVENOUS
Qty: 8 | Refills: 0 | Status: DISCONTINUED | OUTPATIENT
Start: 2021-01-01 | End: 2021-01-01

## 2021-01-01 RX ORDER — MORPHINE SULFATE 50 MG/1
2 CAPSULE, EXTENDED RELEASE ORAL EVERY 4 HOURS
Refills: 0 | Status: DISCONTINUED | OUTPATIENT
Start: 2021-01-01 | End: 2021-01-01

## 2021-01-01 RX ORDER — CHLORHEXIDINE GLUCONATE 213 G/1000ML
1 SOLUTION TOPICAL
Refills: 0 | Status: DISCONTINUED | OUTPATIENT
Start: 2021-01-01 | End: 2021-01-01

## 2021-01-01 RX ORDER — AMIODARONE HYDROCHLORIDE 400 MG/1
0.5 TABLET ORAL
Qty: 900 | Refills: 0 | Status: DISCONTINUED | OUTPATIENT
Start: 2021-01-01 | End: 2021-01-01

## 2021-01-01 RX ORDER — HEPARIN SODIUM 5000 [USP'U]/ML
INJECTION INTRAVENOUS; SUBCUTANEOUS
Qty: 25000 | Refills: 0 | Status: DISCONTINUED | OUTPATIENT
Start: 2021-01-01 | End: 2021-01-01

## 2021-01-01 RX ORDER — INSULIN LISPRO 100/ML
VIAL (ML) SUBCUTANEOUS
Refills: 0 | Status: DISCONTINUED | OUTPATIENT
Start: 2021-01-01 | End: 2021-01-01

## 2021-01-01 RX ORDER — SODIUM CHLORIDE 9 MG/ML
1000 INJECTION, SOLUTION INTRAVENOUS
Refills: 0 | Status: DISCONTINUED | OUTPATIENT
Start: 2021-01-01 | End: 2021-01-01

## 2021-01-01 RX ORDER — CHLORHEXIDINE GLUCONATE 213 G/1000ML
15 SOLUTION TOPICAL EVERY 12 HOURS
Refills: 0 | Status: DISCONTINUED | OUTPATIENT
Start: 2021-01-01 | End: 2021-01-01

## 2021-01-01 RX ORDER — SENNA PLUS 8.6 MG/1
2 TABLET ORAL
Qty: 0 | Refills: 0 | DISCHARGE
Start: 2021-01-01

## 2021-01-01 RX ORDER — SODIUM CHLORIDE 9 MG/ML
1000 INJECTION INTRAMUSCULAR; INTRAVENOUS; SUBCUTANEOUS
Refills: 0 | Status: DISCONTINUED | OUTPATIENT
Start: 2021-01-01 | End: 2021-01-01

## 2021-01-01 RX ORDER — HYDRALAZINE HCL 50 MG
0 TABLET ORAL
Qty: 0 | Refills: 1 | DISCHARGE

## 2021-01-01 RX ORDER — DEXMEDETOMIDINE HYDROCHLORIDE IN 0.9% SODIUM CHLORIDE 4 UG/ML
0.23 INJECTION INTRAVENOUS
Qty: 400 | Refills: 0 | Status: DISCONTINUED | OUTPATIENT
Start: 2021-01-01 | End: 2021-01-01

## 2021-01-01 RX ORDER — LANOLIN ALCOHOL/MO/W.PET/CERES
5 CREAM (GRAM) TOPICAL AT BEDTIME
Refills: 0 | Status: DISCONTINUED | OUTPATIENT
Start: 2021-01-01 | End: 2021-01-01

## 2021-01-01 RX ORDER — METOLAZONE 5 MG/1
5 TABLET ORAL
Qty: 90 | Refills: 0 | Status: ACTIVE | COMMUNITY
Start: 2020-01-01 | End: 1900-01-01

## 2021-01-01 RX ORDER — ASPIRIN/CALCIUM CARB/MAGNESIUM 324 MG
1 TABLET ORAL
Qty: 0 | Refills: 0 | DISCHARGE

## 2021-01-01 RX ORDER — HYDROMORPHONE HYDROCHLORIDE 2 MG/ML
1 INJECTION INTRAMUSCULAR; INTRAVENOUS; SUBCUTANEOUS
Refills: 0 | Status: DISCONTINUED | OUTPATIENT
Start: 2021-01-01 | End: 2021-01-01

## 2021-01-01 RX ORDER — METOLAZONE 5 MG/1
0 TABLET ORAL
Qty: 0 | Refills: 0 | DISCHARGE

## 2021-01-01 RX ORDER — AMIODARONE HYDROCHLORIDE 400 MG/1
1 TABLET ORAL
Qty: 0 | Refills: 0 | DISCHARGE
Start: 2021-01-01

## 2021-01-01 RX ORDER — ERGOCALCIFEROL 1.25 MG/1
1.25 MG CAPSULE, LIQUID FILLED ORAL
Qty: 10 | Refills: 2 | Status: ACTIVE | COMMUNITY
Start: 2020-01-01 | End: 1900-01-01

## 2021-01-01 RX ORDER — ATORVASTATIN CALCIUM 80 MG/1
0 TABLET, FILM COATED ORAL
Qty: 0 | Refills: 0 | DISCHARGE

## 2021-01-01 RX ORDER — ZOLPIDEM TARTRATE 10 MG/1
5 TABLET ORAL ONCE
Refills: 0 | Status: DISCONTINUED | OUTPATIENT
Start: 2021-01-01 | End: 2021-01-01

## 2021-01-01 RX ORDER — ASPIRIN/CALCIUM CARB/MAGNESIUM 324 MG
81 TABLET ORAL DAILY
Refills: 0 | Status: DISCONTINUED | OUTPATIENT
Start: 2021-01-01 | End: 2021-01-01

## 2021-01-01 RX ORDER — DEXTROSE 50 % IN WATER 50 %
12.5 SYRINGE (ML) INTRAVENOUS ONCE
Refills: 0 | Status: DISCONTINUED | OUTPATIENT
Start: 2021-01-01 | End: 2021-01-01

## 2021-01-01 RX ORDER — DOXERCALCIFEROL 2.5 UG/1
2 CAPSULE ORAL
Refills: 0 | Status: DISCONTINUED | OUTPATIENT
Start: 2021-01-01 | End: 2021-01-01

## 2021-01-01 RX ORDER — OXYCODONE AND ACETAMINOPHEN 5; 325 MG/1; MG/1
1 TABLET ORAL ONCE
Refills: 0 | Status: DISCONTINUED | OUTPATIENT
Start: 2021-01-01 | End: 2021-01-01

## 2021-01-01 RX ORDER — CEFAZOLIN SODIUM 1 G
1000 VIAL (EA) INJECTION EVERY 8 HOURS
Refills: 0 | Status: DISCONTINUED | OUTPATIENT
Start: 2021-01-01 | End: 2021-01-01

## 2021-01-01 RX ORDER — ACETAMINOPHEN 500 MG
650 TABLET ORAL ONCE
Refills: 0 | Status: COMPLETED | OUTPATIENT
Start: 2021-01-01 | End: 2021-01-01

## 2021-01-01 RX ORDER — FENTANYL CITRATE 50 UG/ML
0.5 INJECTION INTRAVENOUS
Qty: 2500 | Refills: 0 | Status: DISCONTINUED | OUTPATIENT
Start: 2021-01-01 | End: 2021-01-01

## 2021-01-01 RX ORDER — FUROSEMIDE 40 MG
80 TABLET ORAL
Refills: 0 | Status: CANCELLED | OUTPATIENT
Start: 2021-01-01 | End: 2021-01-01

## 2021-01-01 RX ORDER — COLLAGENASE CLOSTRIDIUM HIST. 250 UNIT/G
1 OINTMENT (GRAM) TOPICAL
Qty: 0 | Refills: 0 | DISCHARGE
Start: 2021-01-01

## 2021-01-01 RX ORDER — HEPARIN SODIUM 5000 [USP'U]/ML
5000 INJECTION INTRAVENOUS; SUBCUTANEOUS EVERY 8 HOURS
Refills: 0 | Status: DISCONTINUED | OUTPATIENT
Start: 2021-01-01 | End: 2021-01-01

## 2021-01-01 RX ORDER — SODIUM CHLORIDE 9 MG/ML
1000 INJECTION INTRAMUSCULAR; INTRAVENOUS; SUBCUTANEOUS ONCE
Refills: 0 | Status: COMPLETED | OUTPATIENT
Start: 2021-01-01 | End: 2021-01-01

## 2021-01-01 RX ORDER — WARFARIN SODIUM 2.5 MG/1
5 TABLET ORAL ONCE
Refills: 0 | Status: DISCONTINUED | OUTPATIENT
Start: 2021-01-01 | End: 2021-01-01

## 2021-01-01 RX ORDER — METOCLOPRAMIDE HCL 10 MG
10 TABLET ORAL ONCE
Refills: 0 | Status: COMPLETED | OUTPATIENT
Start: 2021-01-01 | End: 2021-01-01

## 2021-01-01 RX ORDER — MIDODRINE HYDROCHLORIDE 2.5 MG/1
1 TABLET ORAL
Qty: 0 | Refills: 0 | DISCHARGE
Start: 2021-01-01

## 2021-01-01 RX ORDER — ISOSORBIDE DINITRATE 5 MG/1
10 TABLET ORAL AT BEDTIME
Refills: 0 | Status: COMPLETED | OUTPATIENT
Start: 2021-01-01 | End: 2021-01-01

## 2021-01-01 RX ORDER — METOPROLOL TARTRATE 50 MG
25 TABLET ORAL EVERY 12 HOURS
Refills: 0 | Status: DISCONTINUED | OUTPATIENT
Start: 2021-01-01 | End: 2021-01-01

## 2021-01-01 RX ORDER — ERYTHROPOIETIN 10000 [IU]/ML
10000 INJECTION, SOLUTION INTRAVENOUS; SUBCUTANEOUS
Refills: 0 | Status: DISCONTINUED | OUTPATIENT
Start: 2021-01-01 | End: 2021-01-01

## 2021-01-01 RX ORDER — ALBUTEROL 90 UG/1
1 AEROSOL, METERED ORAL EVERY 4 HOURS
Refills: 0 | Status: DISCONTINUED | OUTPATIENT
Start: 2021-01-01 | End: 2021-01-01

## 2021-01-01 RX ORDER — HEPARIN SODIUM 5000 [USP'U]/ML
1300 INJECTION INTRAVENOUS; SUBCUTANEOUS
Qty: 25000 | Refills: 0 | Status: DISCONTINUED | OUTPATIENT
Start: 2021-01-01 | End: 2021-01-01

## 2021-01-01 RX ORDER — GLUCAGON INJECTION, SOLUTION 0.5 MG/.1ML
1 INJECTION, SOLUTION SUBCUTANEOUS ONCE
Refills: 0 | Status: DISCONTINUED | OUTPATIENT
Start: 2021-01-01 | End: 2021-01-01

## 2021-01-01 RX ORDER — DEXTROSE 50 % IN WATER 50 %
15 SYRINGE (ML) INTRAVENOUS ONCE
Refills: 0 | Status: DISCONTINUED | OUTPATIENT
Start: 2021-01-01 | End: 2021-01-01

## 2021-01-01 RX ORDER — LACTULOSE 10 G/15ML
22.5 SOLUTION ORAL
Qty: 0 | Refills: 0 | DISCHARGE
Start: 2021-01-01

## 2021-01-01 RX ORDER — FUROSEMIDE 40 MG
80 TABLET ORAL
Refills: 0 | Status: DISCONTINUED | OUTPATIENT
Start: 2021-01-01 | End: 2021-01-01

## 2021-01-01 RX ORDER — ESTROGENS, CONJUGATED 0.625 MG/G
1 CREAM WITH APPLICATOR VAGINAL AT BEDTIME
Refills: 0 | Status: DISCONTINUED | OUTPATIENT
Start: 2021-01-01 | End: 2021-01-01

## 2021-01-01 RX ORDER — ZOLPIDEM TARTRATE 10 MG/1
5 TABLET ORAL AT BEDTIME
Refills: 0 | Status: DISCONTINUED | OUTPATIENT
Start: 2021-01-01 | End: 2021-01-01

## 2021-01-01 RX ORDER — COLLAGENASE CLOSTRIDIUM HIST. 250 UNIT/G
1 OINTMENT (GRAM) TOPICAL EVERY 12 HOURS
Refills: 0 | Status: DISCONTINUED | OUTPATIENT
Start: 2021-01-01 | End: 2021-01-01

## 2021-01-01 RX ORDER — LACTULOSE 10 G/15ML
15 SOLUTION ORAL THREE TIMES A DAY
Refills: 0 | Status: DISCONTINUED | OUTPATIENT
Start: 2021-01-01 | End: 2021-01-01

## 2021-01-01 RX ORDER — HEPARIN SODIUM 5000 [USP'U]/ML
25000 INJECTION INTRAVENOUS; SUBCUTANEOUS
Qty: 0 | Refills: 0 | DISCHARGE
Start: 2021-01-01

## 2021-01-01 RX ORDER — INSULIN LISPRO 100/ML
0 VIAL (ML) SUBCUTANEOUS
Qty: 0 | Refills: 0 | DISCHARGE

## 2021-01-01 RX ORDER — MAGNESIUM SULFATE 500 MG/ML
2 VIAL (ML) INJECTION ONCE
Refills: 0 | Status: COMPLETED | OUTPATIENT
Start: 2021-01-01 | End: 2021-01-01

## 2021-01-01 RX ORDER — PANTOPRAZOLE SODIUM 20 MG/1
40 TABLET, DELAYED RELEASE ORAL
Refills: 0 | Status: DISCONTINUED | OUTPATIENT
Start: 2021-01-01 | End: 2021-01-01

## 2021-01-01 RX ORDER — PIPERACILLIN AND TAZOBACTAM 4; .5 G/20ML; G/20ML
2.25 INJECTION, POWDER, LYOPHILIZED, FOR SOLUTION INTRAVENOUS EVERY 8 HOURS
Refills: 0 | Status: DISCONTINUED | OUTPATIENT
Start: 2021-01-01 | End: 2021-01-01

## 2021-01-01 RX ORDER — MEROPENEM 1 G/30ML
INJECTION INTRAVENOUS
Refills: 0 | Status: DISCONTINUED | OUTPATIENT
Start: 2021-01-01 | End: 2021-01-01

## 2021-01-01 RX ORDER — AMIODARONE HYDROCHLORIDE 400 MG/1
200 TABLET ORAL DAILY
Refills: 0 | Status: DISCONTINUED | OUTPATIENT
Start: 2021-01-01 | End: 2021-01-01

## 2021-01-01 RX ORDER — ESMOLOL HCL 100MG/10ML
50 VIAL (ML) INTRAVENOUS
Qty: 2500 | Refills: 0 | Status: DISCONTINUED | OUTPATIENT
Start: 2021-01-01 | End: 2021-01-01

## 2021-01-01 RX ORDER — SEVELAMER CARBONATE 2400 MG/1
0 POWDER, FOR SUSPENSION ORAL
Qty: 0 | Refills: 3 | DISCHARGE

## 2021-01-01 RX ORDER — DEXMEDETOMIDINE HYDROCHLORIDE IN 0.9% SODIUM CHLORIDE 4 UG/ML
0.2 INJECTION INTRAVENOUS
Qty: 400 | Refills: 0 | Status: DISCONTINUED | OUTPATIENT
Start: 2021-01-01 | End: 2021-01-01

## 2021-01-01 RX ORDER — IPRATROPIUM/ALBUTEROL SULFATE 18-103MCG
3 AEROSOL WITH ADAPTER (GRAM) INHALATION EVERY 6 HOURS
Refills: 0 | Status: DISCONTINUED | OUTPATIENT
Start: 2021-01-01 | End: 2021-01-01

## 2021-01-01 RX ORDER — PHENYLEPHRINE HYDROCHLORIDE 10 MG/ML
0.1 INJECTION INTRAVENOUS
Qty: 160 | Refills: 0 | Status: DISCONTINUED | OUTPATIENT
Start: 2021-01-01 | End: 2021-01-01

## 2021-01-01 RX ORDER — ESMOLOL HCL 100MG/10ML
29000 VIAL (ML) INTRAVENOUS ONCE
Refills: 0 | Status: DISCONTINUED | OUTPATIENT
Start: 2021-01-01 | End: 2021-01-01

## 2021-01-01 RX ORDER — COLLAGENASE CLOSTRIDIUM HIST. 250 UNIT/G
1 OINTMENT (GRAM) TOPICAL
Refills: 0 | Status: DISCONTINUED | OUTPATIENT
Start: 2021-01-01 | End: 2021-01-01

## 2021-01-01 RX ORDER — PIPERACILLIN AND TAZOBACTAM 4; .5 G/20ML; G/20ML
2.25 INJECTION, POWDER, LYOPHILIZED, FOR SOLUTION INTRAVENOUS ONCE
Refills: 0 | Status: COMPLETED | OUTPATIENT
Start: 2021-01-01 | End: 2021-01-01

## 2021-01-01 RX ORDER — OXYCODONE HYDROCHLORIDE 5 MG/1
5 TABLET ORAL EVERY 6 HOURS
Refills: 0 | Status: DISCONTINUED | OUTPATIENT
Start: 2021-01-01 | End: 2021-01-01

## 2021-01-01 RX ORDER — CEFAZOLIN SODIUM 1 G
VIAL (EA) INJECTION
Refills: 0 | Status: DISCONTINUED | OUTPATIENT
Start: 2021-01-01 | End: 2021-01-01

## 2021-01-01 RX ORDER — SENNA PLUS 8.6 MG/1
2 TABLET ORAL AT BEDTIME
Refills: 0 | Status: DISCONTINUED | OUTPATIENT
Start: 2021-01-01 | End: 2021-01-01

## 2021-01-01 RX ORDER — PIPERACILLIN AND TAZOBACTAM 4; .5 G/20ML; G/20ML
2.25 INJECTION, POWDER, LYOPHILIZED, FOR SOLUTION INTRAVENOUS EVERY 12 HOURS
Refills: 0 | Status: DISCONTINUED | OUTPATIENT
Start: 2021-01-01 | End: 2021-01-01

## 2021-01-01 RX ORDER — ACETAMINOPHEN 500 MG
2 TABLET ORAL
Qty: 0 | Refills: 0 | DISCHARGE
Start: 2021-01-01

## 2021-01-01 RX ORDER — CEFAZOLIN SODIUM 1 G
1000 VIAL (EA) INJECTION EVERY 8 HOURS
Refills: 0 | Status: COMPLETED | OUTPATIENT
Start: 2021-01-01 | End: 2021-01-01

## 2021-01-01 RX ORDER — NOREPINEPHRINE BITARTRATE/D5W 8 MG/250ML
0.05 PLASTIC BAG, INJECTION (ML) INTRAVENOUS
Qty: 32 | Refills: 0 | Status: DISCONTINUED | OUTPATIENT
Start: 2021-01-01 | End: 2021-01-01

## 2021-01-01 RX ORDER — ASPIRIN/CALCIUM CARB/MAGNESIUM 324 MG
0 TABLET ORAL
Qty: 0 | Refills: 0 | DISCHARGE

## 2021-01-01 RX ORDER — MUPIROCIN 20 MG/G
1 OINTMENT TOPICAL
Refills: 0 | Status: COMPLETED | OUTPATIENT
Start: 2021-01-01 | End: 2021-01-01

## 2021-01-01 RX ORDER — LIDOCAINE HCL 20 MG/ML
5 VIAL (ML) INJECTION ONCE
Refills: 0 | Status: COMPLETED | OUTPATIENT
Start: 2021-01-01 | End: 2021-01-01

## 2021-01-01 RX ORDER — HEPARIN SODIUM 5000 [USP'U]/ML
5000 INJECTION INTRAVENOUS; SUBCUTANEOUS EVERY 12 HOURS
Refills: 0 | Status: DISCONTINUED | OUTPATIENT
Start: 2021-01-01 | End: 2021-01-01

## 2021-01-01 RX ORDER — SEVELAMER CARBONATE 2400 MG/1
1600 POWDER, FOR SUSPENSION ORAL THREE TIMES A DAY
Refills: 0 | Status: DISCONTINUED | OUTPATIENT
Start: 2021-01-01 | End: 2021-01-01

## 2021-01-01 RX ORDER — CARVEDILOL PHOSPHATE 80 MG/1
0 CAPSULE, EXTENDED RELEASE ORAL
Qty: 0 | Refills: 0 | DISCHARGE

## 2021-01-01 RX ORDER — MULTIVIT WITH MIN/MFOLATE/K2 340-15/3 G
1 POWDER (GRAM) ORAL ONCE
Refills: 0 | Status: DISCONTINUED | OUTPATIENT
Start: 2021-01-01 | End: 2021-01-01

## 2021-01-01 RX ORDER — ATORVASTATIN CALCIUM 80 MG/1
20 TABLET, FILM COATED ORAL AT BEDTIME
Refills: 0 | Status: DISCONTINUED | OUTPATIENT
Start: 2021-01-01 | End: 2021-01-01

## 2021-01-01 RX ORDER — ERGOCALCIFEROL 1.25 MG/1
50000 CAPSULE ORAL
Refills: 0 | Status: DISCONTINUED | OUTPATIENT
Start: 2021-01-01 | End: 2021-01-01

## 2021-01-01 RX ORDER — HEPARIN SODIUM 5000 [USP'U]/ML
650 INJECTION INTRAVENOUS; SUBCUTANEOUS
Qty: 25000 | Refills: 0 | Status: DISCONTINUED | OUTPATIENT
Start: 2021-01-01 | End: 2021-01-01

## 2021-01-01 RX ORDER — MIDAZOLAM HYDROCHLORIDE 1 MG/ML
2 INJECTION, SOLUTION INTRAMUSCULAR; INTRAVENOUS ONCE
Refills: 0 | Status: DISCONTINUED | OUTPATIENT
Start: 2021-01-01 | End: 2021-01-01

## 2021-01-01 RX ORDER — METOPROLOL TARTRATE 50 MG
1 TABLET ORAL
Qty: 0 | Refills: 0 | DISCHARGE

## 2021-01-01 RX ORDER — INSULIN LISPRO 100/ML
0 VIAL (ML) SUBCUTANEOUS
Qty: 0 | Refills: 0 | DISCHARGE
Start: 2021-01-01

## 2021-01-01 RX ORDER — CEFOTETAN DISODIUM 1 G
1 VIAL (EA) INJECTION EVERY 12 HOURS
Refills: 0 | Status: COMPLETED | OUTPATIENT
Start: 2021-01-01 | End: 2021-01-01

## 2021-01-01 RX ORDER — LEVETIRACETAM 250 MG/1
500 TABLET, FILM COATED ORAL ONCE
Refills: 0 | Status: COMPLETED | OUTPATIENT
Start: 2021-01-01 | End: 2021-01-01

## 2021-01-01 RX ORDER — ENOXAPARIN SODIUM 100 MG/ML
30 INJECTION SUBCUTANEOUS DAILY
Refills: 0 | Status: DISCONTINUED | OUTPATIENT
Start: 2021-01-01 | End: 2021-01-01

## 2021-01-01 RX ORDER — HEPARIN SODIUM 5000 [USP'U]/ML
1400 INJECTION INTRAVENOUS; SUBCUTANEOUS
Qty: 25000 | Refills: 0 | Status: DISCONTINUED | OUTPATIENT
Start: 2021-01-01 | End: 2021-01-01

## 2021-01-01 RX ORDER — ISOSORBIDE DINITRATE 5 MG/1
10 TABLET ORAL THREE TIMES A DAY
Refills: 0 | Status: DISCONTINUED | OUTPATIENT
Start: 2021-01-01 | End: 2021-01-01

## 2021-01-01 RX ORDER — IPRATROPIUM/ALBUTEROL SULFATE 18-103MCG
1 AEROSOL WITH ADAPTER (GRAM) INHALATION
Refills: 0 | Status: DISCONTINUED | OUTPATIENT
Start: 2021-01-01 | End: 2021-01-01

## 2021-01-01 RX ORDER — INSULIN GLARGINE 100 [IU]/ML
0 INJECTION, SOLUTION SUBCUTANEOUS
Qty: 0 | Refills: 0 | DISCHARGE

## 2021-01-01 RX ORDER — BACITRACIN ZINC 500 UNIT/G
1 OINTMENT IN PACKET (EA) TOPICAL
Qty: 0 | Refills: 0 | DISCHARGE
Start: 2021-01-01

## 2021-01-01 RX ORDER — HEPARIN SODIUM 5000 [USP'U]/ML
1200 INJECTION INTRAVENOUS; SUBCUTANEOUS
Qty: 25000 | Refills: 0 | Status: DISCONTINUED | OUTPATIENT
Start: 2021-01-01 | End: 2021-01-01

## 2021-01-01 RX ORDER — ACETAMINOPHEN 500 MG
650 TABLET ORAL EVERY 6 HOURS
Refills: 0 | Status: DISCONTINUED | OUTPATIENT
Start: 2021-01-01 | End: 2021-01-01

## 2021-01-01 RX ORDER — LEVETIRACETAM 250 MG/1
375 TABLET, FILM COATED ORAL EVERY 12 HOURS
Refills: 0 | Status: DISCONTINUED | OUTPATIENT
Start: 2021-01-01 | End: 2021-01-01

## 2021-01-01 RX ORDER — CEFTRIAXONE 500 MG/1
1000 INJECTION, POWDER, FOR SOLUTION INTRAMUSCULAR; INTRAVENOUS EVERY 24 HOURS
Refills: 0 | Status: COMPLETED | OUTPATIENT
Start: 2021-01-01 | End: 2021-01-01

## 2021-01-01 RX ORDER — ATORVASTATIN CALCIUM 80 MG/1
40 TABLET, FILM COATED ORAL AT BEDTIME
Refills: 0 | Status: DISCONTINUED | OUTPATIENT
Start: 2021-01-01 | End: 2021-01-01

## 2021-01-01 RX ORDER — METOPROLOL TARTRATE 50 MG
25 TABLET ORAL
Refills: 0 | Status: DISCONTINUED | OUTPATIENT
Start: 2021-01-01 | End: 2021-01-01

## 2021-01-01 RX ORDER — FUROSEMIDE 40 MG
0 TABLET ORAL
Qty: 0 | Refills: 1 | DISCHARGE

## 2021-01-01 RX ORDER — MIDODRINE HYDROCHLORIDE 2.5 MG/1
5 TABLET ORAL EVERY 8 HOURS
Refills: 0 | Status: DISCONTINUED | OUTPATIENT
Start: 2021-01-01 | End: 2021-01-01

## 2021-01-01 RX ORDER — INSULIN GLARGINE 100 [IU]/ML
5 INJECTION, SOLUTION SUBCUTANEOUS AT BEDTIME
Refills: 0 | Status: DISCONTINUED | OUTPATIENT
Start: 2021-01-01 | End: 2021-01-01

## 2021-01-01 RX ORDER — METOPROLOL TARTRATE 50 MG
0 TABLET ORAL
Qty: 0 | Refills: 0 | DISCHARGE

## 2021-01-01 RX ORDER — DEXMEDETOMIDINE HYDROCHLORIDE IN 0.9% SODIUM CHLORIDE 4 UG/ML
0.2 INJECTION INTRAVENOUS
Qty: 200 | Refills: 0 | Status: DISCONTINUED | OUTPATIENT
Start: 2021-01-01 | End: 2021-01-01

## 2021-01-01 RX ORDER — AMIODARONE HYDROCHLORIDE 400 MG/1
200 TABLET ORAL ONCE
Refills: 0 | Status: COMPLETED | OUTPATIENT
Start: 2021-01-01 | End: 2021-01-01

## 2021-01-01 RX ORDER — HYDRALAZINE HCL 50 MG
25 TABLET ORAL THREE TIMES A DAY
Refills: 0 | Status: DISCONTINUED | OUTPATIENT
Start: 2021-01-01 | End: 2021-01-01

## 2021-01-01 RX ORDER — MEROPENEM 1 G/30ML
500 INJECTION INTRAVENOUS EVERY 12 HOURS
Refills: 0 | Status: DISCONTINUED | OUTPATIENT
Start: 2021-01-01 | End: 2021-01-01

## 2021-01-01 RX ORDER — CEFEPIME 1 G/1
2000 INJECTION, POWDER, FOR SOLUTION INTRAMUSCULAR; INTRAVENOUS ONCE
Refills: 0 | Status: DISCONTINUED | OUTPATIENT
Start: 2021-01-01 | End: 2021-01-01

## 2021-01-01 RX ORDER — FENTANYL CITRATE 50 UG/ML
0.5 INJECTION INTRAVENOUS
Qty: 5000 | Refills: 0 | Status: DISCONTINUED | OUTPATIENT
Start: 2021-01-01 | End: 2021-01-01

## 2021-01-01 RX ORDER — AMIODARONE HYDROCHLORIDE 400 MG/1
1 TABLET ORAL
Qty: 900 | Refills: 0 | Status: DISCONTINUED | OUTPATIENT
Start: 2021-01-01 | End: 2021-01-01

## 2021-01-01 RX ORDER — FENTANYL CITRATE 50 UG/ML
25 INJECTION INTRAVENOUS ONCE
Refills: 0 | Status: DISCONTINUED | OUTPATIENT
Start: 2021-01-01 | End: 2021-01-01

## 2021-01-01 RX ORDER — MEROPENEM 1 G/30ML
500 INJECTION INTRAVENOUS ONCE
Refills: 0 | Status: COMPLETED | OUTPATIENT
Start: 2021-01-01 | End: 2021-01-01

## 2021-01-01 RX ORDER — SEVELAMER CARBONATE 2400 MG/1
800 POWDER, FOR SUSPENSION ORAL THREE TIMES A DAY
Refills: 0 | Status: DISCONTINUED | OUTPATIENT
Start: 2021-01-01 | End: 2021-01-01

## 2021-01-01 RX ORDER — ERGOCALCIFEROL 1.25 MG/1
0 CAPSULE ORAL
Qty: 0 | Refills: 1 | DISCHARGE

## 2021-01-01 RX ORDER — FENTANYL CITRATE 50 UG/ML
25 INJECTION INTRAVENOUS EVERY 4 HOURS
Refills: 0 | Status: DISCONTINUED | OUTPATIENT
Start: 2021-01-01 | End: 2021-01-01

## 2021-01-01 RX ORDER — MIDODRINE HYDROCHLORIDE 2.5 MG/1
10 TABLET ORAL EVERY 8 HOURS
Refills: 0 | Status: DISCONTINUED | OUTPATIENT
Start: 2021-01-01 | End: 2021-01-01

## 2021-01-01 RX ORDER — HYDRALAZINE HCL 50 MG
25 TABLET ORAL AT BEDTIME
Refills: 0 | Status: COMPLETED | OUTPATIENT
Start: 2021-01-01 | End: 2021-01-01

## 2021-01-01 RX ORDER — SENNA PLUS 8.6 MG/1
2 TABLET ORAL DAILY
Refills: 0 | Status: DISCONTINUED | OUTPATIENT
Start: 2021-01-01 | End: 2021-01-01

## 2021-01-01 RX ORDER — SODIUM CHLORIDE 9 MG/ML
1000 INJECTION, SOLUTION INTRAVENOUS ONCE
Refills: 0 | Status: COMPLETED | OUTPATIENT
Start: 2021-01-01 | End: 2021-01-01

## 2021-01-01 RX ORDER — METOLAZONE 5 MG/1
1 TABLET ORAL
Qty: 0 | Refills: 0 | DISCHARGE

## 2021-01-01 RX ADMIN — FENTANYL CITRATE 2.9 MICROGRAM(S)/KG/HR: 50 INJECTION INTRAVENOUS at 16:00

## 2021-01-01 RX ADMIN — LEVETIRACETAM 415 MILLIGRAM(S): 250 TABLET, FILM COATED ORAL at 18:56

## 2021-01-01 RX ADMIN — FENTANYL CITRATE 1 PATCH: 50 INJECTION INTRAVENOUS at 08:01

## 2021-01-01 RX ADMIN — Medication 81 MILLIGRAM(S): at 11:07

## 2021-01-01 RX ADMIN — PANTOPRAZOLE SODIUM 40 MILLIGRAM(S): 20 TABLET, DELAYED RELEASE ORAL at 12:15

## 2021-01-01 RX ADMIN — Medication 4: at 22:12

## 2021-01-01 RX ADMIN — Medication 250 MILLIGRAM(S): at 17:00

## 2021-01-01 RX ADMIN — Medication 1 APPLICATION(S): at 05:36

## 2021-01-01 RX ADMIN — CHLORHEXIDINE GLUCONATE 1 APPLICATION(S): 213 SOLUTION TOPICAL at 16:21

## 2021-01-01 RX ADMIN — ERYTHROPOIETIN 10000 UNIT(S): 10000 INJECTION, SOLUTION INTRAVENOUS; SUBCUTANEOUS at 08:39

## 2021-01-01 RX ADMIN — PIPERACILLIN AND TAZOBACTAM 200 GRAM(S): 4; .5 INJECTION, POWDER, LYOPHILIZED, FOR SOLUTION INTRAVENOUS at 21:07

## 2021-01-01 RX ADMIN — Medication 80 MILLIGRAM(S): at 18:32

## 2021-01-01 RX ADMIN — POLYETHYLENE GLYCOL 3350 17 GRAM(S): 17 POWDER, FOR SOLUTION ORAL at 21:51

## 2021-01-01 RX ADMIN — Medication 81 MILLIGRAM(S): at 12:21

## 2021-01-01 RX ADMIN — PANTOPRAZOLE SODIUM 40 MILLIGRAM(S): 20 TABLET, DELAYED RELEASE ORAL at 12:14

## 2021-01-01 RX ADMIN — Medication 1 APPLICATION(S): at 17:36

## 2021-01-01 RX ADMIN — PIPERACILLIN AND TAZOBACTAM 200 GRAM(S): 4; .5 INJECTION, POWDER, LYOPHILIZED, FOR SOLUTION INTRAVENOUS at 05:13

## 2021-01-01 RX ADMIN — CHLORHEXIDINE GLUCONATE 15 MILLILITER(S): 213 SOLUTION TOPICAL at 05:26

## 2021-01-01 RX ADMIN — HEPARIN SODIUM 12 UNIT(S)/HR: 5000 INJECTION INTRAVENOUS; SUBCUTANEOUS at 10:01

## 2021-01-01 RX ADMIN — PHENYLEPHRINE HYDROCHLORIDE 1.09 MICROGRAM(S)/KG/MIN: 10 INJECTION INTRAVENOUS at 02:48

## 2021-01-01 RX ADMIN — SEVELAMER CARBONATE 1600 MILLIGRAM(S): 2400 POWDER, FOR SUSPENSION ORAL at 05:29

## 2021-01-01 RX ADMIN — CHLORHEXIDINE GLUCONATE 15 MILLILITER(S): 213 SOLUTION TOPICAL at 05:00

## 2021-01-01 RX ADMIN — LACTULOSE 15 GRAM(S): 10 SOLUTION ORAL at 00:00

## 2021-01-01 RX ADMIN — Medication 1 APPLICATION(S): at 06:09

## 2021-01-01 RX ADMIN — MIDODRINE HYDROCHLORIDE 10 MILLIGRAM(S): 2.5 TABLET ORAL at 21:01

## 2021-01-01 RX ADMIN — DEXMEDETOMIDINE HYDROCHLORIDE IN 0.9% SODIUM CHLORIDE 3.3 MICROGRAM(S)/KG/HR: 4 INJECTION INTRAVENOUS at 13:01

## 2021-01-01 RX ADMIN — PANTOPRAZOLE SODIUM 40 MILLIGRAM(S): 20 TABLET, DELAYED RELEASE ORAL at 11:52

## 2021-01-01 RX ADMIN — LACTULOSE 15 GRAM(S): 10 SOLUTION ORAL at 01:11

## 2021-01-01 RX ADMIN — LEVETIRACETAM 415 MILLIGRAM(S): 250 TABLET, FILM COATED ORAL at 18:27

## 2021-01-01 RX ADMIN — FENTANYL CITRATE 1 PATCH: 50 INJECTION INTRAVENOUS at 18:56

## 2021-01-01 RX ADMIN — DIVALPROEX SODIUM 250 MILLIGRAM(S): 500 TABLET, DELAYED RELEASE ORAL at 05:01

## 2021-01-01 RX ADMIN — Medication 1 APPLICATION(S): at 18:27

## 2021-01-01 RX ADMIN — SERTRALINE 50 MILLIGRAM(S): 25 TABLET, FILM COATED ORAL at 11:05

## 2021-01-01 RX ADMIN — AMIODARONE HYDROCHLORIDE 200 MILLIGRAM(S): 400 TABLET ORAL at 05:01

## 2021-01-01 RX ADMIN — Medication 1 APPLICATION(S): at 18:11

## 2021-01-01 RX ADMIN — Medication 1 APPLICATION(S): at 17:43

## 2021-01-01 RX ADMIN — SEVELAMER CARBONATE 1600 MILLIGRAM(S): 2400 POWDER, FOR SUSPENSION ORAL at 05:51

## 2021-01-01 RX ADMIN — HYDROMORPHONE HYDROCHLORIDE 0.5 MILLIGRAM(S): 2 INJECTION INTRAMUSCULAR; INTRAVENOUS; SUBCUTANEOUS at 17:00

## 2021-01-01 RX ADMIN — MORPHINE SULFATE 6 MILLIGRAM(S): 50 CAPSULE, EXTENDED RELEASE ORAL at 02:19

## 2021-01-01 RX ADMIN — PANTOPRAZOLE SODIUM 40 MILLIGRAM(S): 20 TABLET, DELAYED RELEASE ORAL at 12:21

## 2021-01-01 RX ADMIN — SODIUM CHLORIDE 75 MILLILITER(S): 9 INJECTION INTRAMUSCULAR; INTRAVENOUS; SUBCUTANEOUS at 01:19

## 2021-01-01 RX ADMIN — Medication 650 MILLIGRAM(S): at 21:46

## 2021-01-01 RX ADMIN — ATORVASTATIN CALCIUM 40 MILLIGRAM(S): 80 TABLET, FILM COATED ORAL at 11:31

## 2021-01-01 RX ADMIN — Medication 250 MILLIGRAM(S): at 06:05

## 2021-01-01 RX ADMIN — LEVETIRACETAM 415 MILLIGRAM(S): 250 TABLET, FILM COATED ORAL at 18:01

## 2021-01-01 RX ADMIN — HEPARIN SODIUM 5000 UNIT(S): 5000 INJECTION INTRAVENOUS; SUBCUTANEOUS at 05:06

## 2021-01-01 RX ADMIN — ATORVASTATIN CALCIUM 40 MILLIGRAM(S): 80 TABLET, FILM COATED ORAL at 22:31

## 2021-01-01 RX ADMIN — SEVELAMER CARBONATE 1600 MILLIGRAM(S): 2400 POWDER, FOR SUSPENSION ORAL at 21:10

## 2021-01-01 RX ADMIN — CHLORHEXIDINE GLUCONATE 15 MILLILITER(S): 213 SOLUTION TOPICAL at 05:32

## 2021-01-01 RX ADMIN — DEXMEDETOMIDINE HYDROCHLORIDE IN 0.9% SODIUM CHLORIDE 3.3 MICROGRAM(S)/KG/HR: 4 INJECTION INTRAVENOUS at 10:06

## 2021-01-01 RX ADMIN — LACTULOSE 15 GRAM(S): 10 SOLUTION ORAL at 21:02

## 2021-01-01 RX ADMIN — SENNA PLUS 2 TABLET(S): 8.6 TABLET ORAL at 21:51

## 2021-01-01 RX ADMIN — CHLORHEXIDINE GLUCONATE 15 MILLILITER(S): 213 SOLUTION TOPICAL at 05:28

## 2021-01-01 RX ADMIN — ERYTHROPOIETIN 10000 UNIT(S): 10000 INJECTION, SOLUTION INTRAVENOUS; SUBCUTANEOUS at 08:43

## 2021-01-01 RX ADMIN — ATORVASTATIN CALCIUM 20 MILLIGRAM(S): 80 TABLET, FILM COATED ORAL at 22:47

## 2021-01-01 RX ADMIN — Medication 250 MILLIGRAM(S): at 17:11

## 2021-01-01 RX ADMIN — DOXERCALCIFEROL 2 MICROGRAM(S): 2.5 CAPSULE ORAL at 11:53

## 2021-01-01 RX ADMIN — Medication 81 MILLIGRAM(S): at 15:55

## 2021-01-01 RX ADMIN — Medication 81 MILLIGRAM(S): at 18:05

## 2021-01-01 RX ADMIN — CHLORHEXIDINE GLUCONATE 15 MILLILITER(S): 213 SOLUTION TOPICAL at 05:06

## 2021-01-01 RX ADMIN — LEVETIRACETAM 415 MILLIGRAM(S): 250 TABLET, FILM COATED ORAL at 17:38

## 2021-01-01 RX ADMIN — Medication 1 APPLICATION(S): at 05:00

## 2021-01-01 RX ADMIN — AMIODARONE HYDROCHLORIDE 200 MILLIGRAM(S): 400 TABLET ORAL at 17:10

## 2021-01-01 RX ADMIN — SEVELAMER CARBONATE 800 MILLIGRAM(S): 2400 POWDER, FOR SUSPENSION ORAL at 12:16

## 2021-01-01 RX ADMIN — ZOLPIDEM TARTRATE 5 MILLIGRAM(S): 10 TABLET ORAL at 22:50

## 2021-01-01 RX ADMIN — Medication 1 APPLICATION(S): at 17:09

## 2021-01-01 RX ADMIN — Medication 1 APPLICATION(S): at 05:06

## 2021-01-01 RX ADMIN — SERTRALINE 50 MILLIGRAM(S): 25 TABLET, FILM COATED ORAL at 11:43

## 2021-01-01 RX ADMIN — FENTANYL CITRATE 25 MICROGRAM(S): 50 INJECTION INTRAVENOUS at 22:08

## 2021-01-01 RX ADMIN — Medication 4: at 13:21

## 2021-01-01 RX ADMIN — HYDROMORPHONE HYDROCHLORIDE 0.5 MILLIGRAM(S): 2 INJECTION INTRAMUSCULAR; INTRAVENOUS; SUBCUTANEOUS at 01:45

## 2021-01-01 RX ADMIN — LEVETIRACETAM 415 MILLIGRAM(S): 250 TABLET, FILM COATED ORAL at 17:18

## 2021-01-01 RX ADMIN — PANTOPRAZOLE SODIUM 40 MILLIGRAM(S): 20 TABLET, DELAYED RELEASE ORAL at 11:31

## 2021-01-01 RX ADMIN — LACTULOSE 15 GRAM(S): 10 SOLUTION ORAL at 21:53

## 2021-01-01 RX ADMIN — PANTOPRAZOLE SODIUM 40 MILLIGRAM(S): 20 TABLET, DELAYED RELEASE ORAL at 12:24

## 2021-01-01 RX ADMIN — CHLORHEXIDINE GLUCONATE 15 MILLILITER(S): 213 SOLUTION TOPICAL at 17:48

## 2021-01-01 RX ADMIN — SERTRALINE 50 MILLIGRAM(S): 25 TABLET, FILM COATED ORAL at 11:28

## 2021-01-01 RX ADMIN — LEVETIRACETAM 415 MILLIGRAM(S): 250 TABLET, FILM COATED ORAL at 17:37

## 2021-01-01 RX ADMIN — LEVETIRACETAM 415 MILLIGRAM(S): 250 TABLET, FILM COATED ORAL at 19:08

## 2021-01-01 RX ADMIN — AMIODARONE HYDROCHLORIDE 200 MILLIGRAM(S): 400 TABLET ORAL at 05:15

## 2021-01-01 RX ADMIN — Medication 5 MILLIGRAM(S): at 15:04

## 2021-01-01 RX ADMIN — PHENYLEPHRINE HYDROCHLORIDE 1.09 MICROGRAM(S)/KG/MIN: 10 INJECTION INTRAVENOUS at 07:07

## 2021-01-01 RX ADMIN — SENNA PLUS 2 TABLET(S): 8.6 TABLET ORAL at 21:07

## 2021-01-01 RX ADMIN — LACTULOSE 15 GRAM(S): 10 SOLUTION ORAL at 05:01

## 2021-01-01 RX ADMIN — LACTULOSE 15 GRAM(S): 10 SOLUTION ORAL at 16:24

## 2021-01-01 RX ADMIN — AMIODARONE HYDROCHLORIDE 200 MILLIGRAM(S): 400 TABLET ORAL at 05:32

## 2021-01-01 RX ADMIN — SENNA PLUS 2 TABLET(S): 8.6 TABLET ORAL at 12:10

## 2021-01-01 RX ADMIN — LACTULOSE 15 GRAM(S): 10 SOLUTION ORAL at 05:03

## 2021-01-01 RX ADMIN — Medication 81 MILLIGRAM(S): at 12:03

## 2021-01-01 RX ADMIN — HYDROMORPHONE HYDROCHLORIDE 0.5 MILLIGRAM(S): 2 INJECTION INTRAMUSCULAR; INTRAVENOUS; SUBCUTANEOUS at 08:11

## 2021-01-01 RX ADMIN — AMIODARONE HYDROCHLORIDE 200 MILLIGRAM(S): 400 TABLET ORAL at 11:38

## 2021-01-01 RX ADMIN — DOXERCALCIFEROL 2 MICROGRAM(S): 2.5 CAPSULE ORAL at 13:13

## 2021-01-01 RX ADMIN — CHLORHEXIDINE GLUCONATE 15 MILLILITER(S): 213 SOLUTION TOPICAL at 05:15

## 2021-01-01 RX ADMIN — CHLORHEXIDINE GLUCONATE 15 MILLILITER(S): 213 SOLUTION TOPICAL at 18:16

## 2021-01-01 RX ADMIN — CHLORHEXIDINE GLUCONATE 15 MILLILITER(S): 213 SOLUTION TOPICAL at 17:09

## 2021-01-01 RX ADMIN — Medication 250 MILLIGRAM(S): at 18:52

## 2021-01-01 RX ADMIN — Medication 5 MILLIGRAM(S): at 14:54

## 2021-01-01 RX ADMIN — AMIODARONE HYDROCHLORIDE 200 MILLIGRAM(S): 400 TABLET ORAL at 18:05

## 2021-01-01 RX ADMIN — PANTOPRAZOLE SODIUM 40 MILLIGRAM(S): 20 TABLET, DELAYED RELEASE ORAL at 15:34

## 2021-01-01 RX ADMIN — DOXERCALCIFEROL 2 MICROGRAM(S): 2.5 CAPSULE ORAL at 14:37

## 2021-01-01 RX ADMIN — LEVETIRACETAM 415 MILLIGRAM(S): 250 TABLET, FILM COATED ORAL at 06:14

## 2021-01-01 RX ADMIN — SENNA PLUS 2 TABLET(S): 8.6 TABLET ORAL at 22:31

## 2021-01-01 RX ADMIN — TEMAZEPAM 15 MILLIGRAM(S): 15 CAPSULE ORAL at 22:43

## 2021-01-01 RX ADMIN — Medication 81 MILLIGRAM(S): at 11:28

## 2021-01-01 RX ADMIN — CHLORHEXIDINE GLUCONATE 15 MILLILITER(S): 213 SOLUTION TOPICAL at 18:05

## 2021-01-01 RX ADMIN — SENNA PLUS 2 TABLET(S): 8.6 TABLET ORAL at 11:25

## 2021-01-01 RX ADMIN — PANTOPRAZOLE SODIUM 40 MILLIGRAM(S): 20 TABLET, DELAYED RELEASE ORAL at 11:28

## 2021-01-01 RX ADMIN — Medication 80 MILLIGRAM(S): at 05:27

## 2021-01-01 RX ADMIN — HYDROMORPHONE HYDROCHLORIDE 0.5 MILLIGRAM(S): 2 INJECTION INTRAMUSCULAR; INTRAVENOUS; SUBCUTANEOUS at 16:05

## 2021-01-01 RX ADMIN — LEVETIRACETAM 415 MILLIGRAM(S): 250 TABLET, FILM COATED ORAL at 18:34

## 2021-01-01 RX ADMIN — Medication 250 MILLIGRAM(S): at 18:27

## 2021-01-01 RX ADMIN — AMIODARONE HYDROCHLORIDE 200 MILLIGRAM(S): 400 TABLET ORAL at 05:18

## 2021-01-01 RX ADMIN — FENTANYL CITRATE 2.9 MICROGRAM(S)/KG/HR: 50 INJECTION INTRAVENOUS at 05:37

## 2021-01-01 RX ADMIN — ATORVASTATIN CALCIUM 40 MILLIGRAM(S): 80 TABLET, FILM COATED ORAL at 22:13

## 2021-01-01 RX ADMIN — LEVETIRACETAM 415 MILLIGRAM(S): 250 TABLET, FILM COATED ORAL at 17:51

## 2021-01-01 RX ADMIN — HEPARIN SODIUM 5000 UNIT(S): 5000 INJECTION INTRAVENOUS; SUBCUTANEOUS at 05:37

## 2021-01-01 RX ADMIN — LEVETIRACETAM 400 MILLIGRAM(S): 250 TABLET, FILM COATED ORAL at 11:40

## 2021-01-01 RX ADMIN — Medication 4: at 18:56

## 2021-01-01 RX ADMIN — MORPHINE SULFATE 2 MILLIGRAM(S): 50 CAPSULE, EXTENDED RELEASE ORAL at 09:57

## 2021-01-01 RX ADMIN — Medication 250 MILLIGRAM(S): at 17:57

## 2021-01-01 RX ADMIN — MIDODRINE HYDROCHLORIDE 10 MILLIGRAM(S): 2.5 TABLET ORAL at 16:23

## 2021-01-01 RX ADMIN — Medication 650 MILLIGRAM(S): at 17:27

## 2021-01-01 RX ADMIN — CHLORHEXIDINE GLUCONATE 15 MILLILITER(S): 213 SOLUTION TOPICAL at 05:18

## 2021-01-01 RX ADMIN — HEPARIN SODIUM 5000 UNIT(S): 5000 INJECTION INTRAVENOUS; SUBCUTANEOUS at 05:00

## 2021-01-01 RX ADMIN — AMIODARONE HYDROCHLORIDE 200 MILLIGRAM(S): 400 TABLET ORAL at 06:09

## 2021-01-01 RX ADMIN — CEFTRIAXONE 100 MILLIGRAM(S): 500 INJECTION, POWDER, FOR SOLUTION INTRAMUSCULAR; INTRAVENOUS at 09:18

## 2021-01-01 RX ADMIN — HYDROMORPHONE HYDROCHLORIDE 0.5 MILLIGRAM(S): 2 INJECTION INTRAMUSCULAR; INTRAVENOUS; SUBCUTANEOUS at 06:19

## 2021-01-01 RX ADMIN — CHLORHEXIDINE GLUCONATE 15 MILLILITER(S): 213 SOLUTION TOPICAL at 17:57

## 2021-01-01 RX ADMIN — CHLORHEXIDINE GLUCONATE 15 MILLILITER(S): 213 SOLUTION TOPICAL at 06:00

## 2021-01-01 RX ADMIN — SEVELAMER CARBONATE 1600 MILLIGRAM(S): 2400 POWDER, FOR SUSPENSION ORAL at 22:47

## 2021-01-01 RX ADMIN — Medication 2: at 08:00

## 2021-01-01 RX ADMIN — SENNA PLUS 2 TABLET(S): 8.6 TABLET ORAL at 22:36

## 2021-01-01 RX ADMIN — PANTOPRAZOLE SODIUM 40 MILLIGRAM(S): 20 TABLET, DELAYED RELEASE ORAL at 12:01

## 2021-01-01 RX ADMIN — SENNA PLUS 2 TABLET(S): 8.6 TABLET ORAL at 21:53

## 2021-01-01 RX ADMIN — MORPHINE SULFATE 2 MILLIGRAM(S): 50 CAPSULE, EXTENDED RELEASE ORAL at 10:48

## 2021-01-01 RX ADMIN — CHLORHEXIDINE GLUCONATE 15 MILLILITER(S): 213 SOLUTION TOPICAL at 05:02

## 2021-01-01 RX ADMIN — HYDROMORPHONE HYDROCHLORIDE 0.5 MILLIGRAM(S): 2 INJECTION INTRAMUSCULAR; INTRAVENOUS; SUBCUTANEOUS at 18:20

## 2021-01-01 RX ADMIN — Medication 81 MILLIGRAM(S): at 11:02

## 2021-01-01 RX ADMIN — PIPERACILLIN AND TAZOBACTAM 200 GRAM(S): 4; .5 INJECTION, POWDER, LYOPHILIZED, FOR SOLUTION INTRAVENOUS at 15:55

## 2021-01-01 RX ADMIN — LACTULOSE 15 GRAM(S): 10 SOLUTION ORAL at 05:05

## 2021-01-01 RX ADMIN — CHLORHEXIDINE GLUCONATE 1 APPLICATION(S): 213 SOLUTION TOPICAL at 05:36

## 2021-01-01 RX ADMIN — LACTULOSE 15 GRAM(S): 10 SOLUTION ORAL at 14:40

## 2021-01-01 RX ADMIN — SERTRALINE 50 MILLIGRAM(S): 25 TABLET, FILM COATED ORAL at 12:37

## 2021-01-01 RX ADMIN — HEPARIN SODIUM 5000 UNIT(S): 5000 INJECTION INTRAVENOUS; SUBCUTANEOUS at 21:01

## 2021-01-01 RX ADMIN — LEVETIRACETAM 415 MILLIGRAM(S): 250 TABLET, FILM COATED ORAL at 05:12

## 2021-01-01 RX ADMIN — POLYETHYLENE GLYCOL 3350 17 GRAM(S): 17 POWDER, FOR SOLUTION ORAL at 21:08

## 2021-01-01 RX ADMIN — CHLORHEXIDINE GLUCONATE 1 APPLICATION(S): 213 SOLUTION TOPICAL at 05:10

## 2021-01-01 RX ADMIN — HEPARIN SODIUM 5000 UNIT(S): 5000 INJECTION INTRAVENOUS; SUBCUTANEOUS at 15:55

## 2021-01-01 RX ADMIN — CHLORHEXIDINE GLUCONATE 1 APPLICATION(S): 213 SOLUTION TOPICAL at 05:51

## 2021-01-01 RX ADMIN — AMIODARONE HYDROCHLORIDE 200 MILLIGRAM(S): 400 TABLET ORAL at 05:06

## 2021-01-01 RX ADMIN — Medication 1 APPLICATION(S): at 18:07

## 2021-01-01 RX ADMIN — SODIUM CHLORIDE 30 MILLILITER(S): 9 INJECTION INTRAMUSCULAR; INTRAVENOUS; SUBCUTANEOUS at 09:30

## 2021-01-01 RX ADMIN — LACTULOSE 15 GRAM(S): 10 SOLUTION ORAL at 22:35

## 2021-01-01 RX ADMIN — ERGOCALCIFEROL 50000 UNIT(S): 1.25 CAPSULE ORAL at 10:46

## 2021-01-01 RX ADMIN — FENTANYL CITRATE 1 PATCH: 50 INJECTION INTRAVENOUS at 07:04

## 2021-01-01 RX ADMIN — LACTULOSE 15 GRAM(S): 10 SOLUTION ORAL at 15:57

## 2021-01-01 RX ADMIN — Medication 1 APPLICATION(S): at 05:03

## 2021-01-01 RX ADMIN — Medication 30 MILLILITER(S): at 10:09

## 2021-01-01 RX ADMIN — HEPARIN SODIUM 12 UNIT(S)/HR: 5000 INJECTION INTRAVENOUS; SUBCUTANEOUS at 02:46

## 2021-01-01 RX ADMIN — Medication 25 MILLIGRAM(S): at 05:12

## 2021-01-01 RX ADMIN — LEVETIRACETAM 415 MILLIGRAM(S): 250 TABLET, FILM COATED ORAL at 05:40

## 2021-01-01 RX ADMIN — ATORVASTATIN CALCIUM 40 MILLIGRAM(S): 80 TABLET, FILM COATED ORAL at 11:56

## 2021-01-01 RX ADMIN — CHLORHEXIDINE GLUCONATE 1 APPLICATION(S): 213 SOLUTION TOPICAL at 05:08

## 2021-01-01 RX ADMIN — PROPOFOL 3.96 MICROGRAM(S)/KG/MIN: 10 INJECTION, EMULSION INTRAVENOUS at 00:01

## 2021-01-01 RX ADMIN — AMIODARONE HYDROCHLORIDE 200 MILLIGRAM(S): 400 TABLET ORAL at 05:05

## 2021-01-01 RX ADMIN — PIPERACILLIN AND TAZOBACTAM 200 GRAM(S): 4; .5 INJECTION, POWDER, LYOPHILIZED, FOR SOLUTION INTRAVENOUS at 06:00

## 2021-01-01 RX ADMIN — Medication 81 MILLIGRAM(S): at 11:25

## 2021-01-01 RX ADMIN — PANTOPRAZOLE SODIUM 40 MILLIGRAM(S): 20 TABLET, DELAYED RELEASE ORAL at 11:30

## 2021-01-01 RX ADMIN — MUPIROCIN 1 APPLICATION(S): 20 OINTMENT TOPICAL at 05:07

## 2021-01-01 RX ADMIN — CHLORHEXIDINE GLUCONATE 15 MILLILITER(S): 213 SOLUTION TOPICAL at 17:37

## 2021-01-01 RX ADMIN — DEXMEDETOMIDINE HYDROCHLORIDE IN 0.9% SODIUM CHLORIDE 3.3 MICROGRAM(S)/KG/HR: 4 INJECTION INTRAVENOUS at 05:02

## 2021-01-01 RX ADMIN — CEFTRIAXONE 100 MILLIGRAM(S): 500 INJECTION, POWDER, FOR SOLUTION INTRAMUSCULAR; INTRAVENOUS at 11:30

## 2021-01-01 RX ADMIN — PROPOFOL 3.96 MICROGRAM(S)/KG/MIN: 10 INJECTION, EMULSION INTRAVENOUS at 21:31

## 2021-01-01 RX ADMIN — HEPARIN SODIUM 10 UNIT(S)/HR: 5000 INJECTION INTRAVENOUS; SUBCUTANEOUS at 11:15

## 2021-01-01 RX ADMIN — MUPIROCIN 1 APPLICATION(S): 20 OINTMENT TOPICAL at 05:01

## 2021-01-01 RX ADMIN — HEPARIN SODIUM 5000 UNIT(S): 5000 INJECTION INTRAVENOUS; SUBCUTANEOUS at 21:04

## 2021-01-01 RX ADMIN — DOXERCALCIFEROL 2 MICROGRAM(S): 2.5 CAPSULE ORAL at 08:43

## 2021-01-01 RX ADMIN — ATORVASTATIN CALCIUM 40 MILLIGRAM(S): 80 TABLET, FILM COATED ORAL at 12:02

## 2021-01-01 RX ADMIN — LEVETIRACETAM 415 MILLIGRAM(S): 250 TABLET, FILM COATED ORAL at 06:16

## 2021-01-01 RX ADMIN — Medication 1 APPLICATION(S): at 05:15

## 2021-01-01 RX ADMIN — PANTOPRAZOLE SODIUM 40 MILLIGRAM(S): 20 TABLET, DELAYED RELEASE ORAL at 11:37

## 2021-01-01 RX ADMIN — HEPARIN SODIUM 11 UNIT(S)/HR: 5000 INJECTION INTRAVENOUS; SUBCUTANEOUS at 00:18

## 2021-01-01 RX ADMIN — AMIODARONE HYDROCHLORIDE 200 MILLIGRAM(S): 400 TABLET ORAL at 17:18

## 2021-01-01 RX ADMIN — AMIODARONE HYDROCHLORIDE 200 MILLIGRAM(S): 400 TABLET ORAL at 11:24

## 2021-01-01 RX ADMIN — Medication 80 MILLIGRAM(S): at 05:05

## 2021-01-01 RX ADMIN — Medication 1 APPLICATION(S): at 17:22

## 2021-01-01 RX ADMIN — AMIODARONE HYDROCHLORIDE 200 MILLIGRAM(S): 400 TABLET ORAL at 05:00

## 2021-01-01 RX ADMIN — CHLORHEXIDINE GLUCONATE 15 MILLILITER(S): 213 SOLUTION TOPICAL at 17:28

## 2021-01-01 RX ADMIN — LACTULOSE 15 GRAM(S): 10 SOLUTION ORAL at 21:56

## 2021-01-01 RX ADMIN — ATORVASTATIN CALCIUM 40 MILLIGRAM(S): 80 TABLET, FILM COATED ORAL at 22:35

## 2021-01-01 RX ADMIN — Medication 250 MILLIGRAM(S): at 05:05

## 2021-01-01 RX ADMIN — Medication 1 APPLICATION(S): at 05:02

## 2021-01-01 RX ADMIN — ATORVASTATIN CALCIUM 40 MILLIGRAM(S): 80 TABLET, FILM COATED ORAL at 11:25

## 2021-01-01 RX ADMIN — LEVETIRACETAM 415 MILLIGRAM(S): 250 TABLET, FILM COATED ORAL at 17:31

## 2021-01-01 RX ADMIN — ERYTHROPOIETIN 10000 UNIT(S): 10000 INJECTION, SOLUTION INTRAVENOUS; SUBCUTANEOUS at 09:27

## 2021-01-01 RX ADMIN — AMIODARONE HYDROCHLORIDE 33.3 MG/MIN: 400 TABLET ORAL at 12:00

## 2021-01-01 RX ADMIN — LEVETIRACETAM 415 MILLIGRAM(S): 250 TABLET, FILM COATED ORAL at 17:24

## 2021-01-01 RX ADMIN — Medication 4: at 06:21

## 2021-01-01 RX ADMIN — Medication 1 APPLICATION(S): at 17:28

## 2021-01-01 RX ADMIN — DEXMEDETOMIDINE HYDROCHLORIDE IN 0.9% SODIUM CHLORIDE 3.3 MICROGRAM(S)/KG/HR: 4 INJECTION INTRAVENOUS at 20:37

## 2021-01-01 RX ADMIN — MIDODRINE HYDROCHLORIDE 5 MILLIGRAM(S): 2.5 TABLET ORAL at 05:15

## 2021-01-01 RX ADMIN — SERTRALINE 50 MILLIGRAM(S): 25 TABLET, FILM COATED ORAL at 11:30

## 2021-01-01 RX ADMIN — INSULIN GLARGINE 5 UNIT(S): 100 INJECTION, SOLUTION SUBCUTANEOUS at 22:13

## 2021-01-01 RX ADMIN — LEVETIRACETAM 415 MILLIGRAM(S): 250 TABLET, FILM COATED ORAL at 17:30

## 2021-01-01 RX ADMIN — Medication 250 MILLIGRAM(S): at 05:03

## 2021-01-01 RX ADMIN — Medication 250 MILLIGRAM(S): at 06:09

## 2021-01-01 RX ADMIN — Medication 2: at 15:58

## 2021-01-01 RX ADMIN — AMIODARONE HYDROCHLORIDE 200 MILLIGRAM(S): 400 TABLET ORAL at 06:26

## 2021-01-01 RX ADMIN — Medication 1 APPLICATION(S): at 17:30

## 2021-01-01 RX ADMIN — PIPERACILLIN AND TAZOBACTAM 200 GRAM(S): 4; .5 INJECTION, POWDER, LYOPHILIZED, FOR SOLUTION INTRAVENOUS at 05:21

## 2021-01-01 RX ADMIN — Medication 5 MILLIGRAM(S): at 23:37

## 2021-01-01 RX ADMIN — Medication 1 GRAM(S): at 21:54

## 2021-01-01 RX ADMIN — Medication 250 MILLIGRAM(S): at 17:15

## 2021-01-01 RX ADMIN — MORPHINE SULFATE 2 MILLIGRAM(S): 50 CAPSULE, EXTENDED RELEASE ORAL at 14:48

## 2021-01-01 RX ADMIN — PHENYLEPHRINE HYDROCHLORIDE 1.09 MICROGRAM(S)/KG/MIN: 10 INJECTION INTRAVENOUS at 07:45

## 2021-01-01 RX ADMIN — Medication 1 APPLICATION(S): at 17:29

## 2021-01-01 RX ADMIN — Medication 1 APPLICATION(S): at 05:04

## 2021-01-01 RX ADMIN — Medication 250 MILLIGRAM(S): at 05:06

## 2021-01-01 RX ADMIN — HEPARIN SODIUM 5000 UNIT(S): 5000 INJECTION INTRAVENOUS; SUBCUTANEOUS at 15:21

## 2021-01-01 RX ADMIN — POLYETHYLENE GLYCOL 3350 17 GRAM(S): 17 POWDER, FOR SOLUTION ORAL at 21:04

## 2021-01-01 RX ADMIN — LACTULOSE 15 GRAM(S): 10 SOLUTION ORAL at 13:01

## 2021-01-01 RX ADMIN — Medication 1 APPLICATION(S): at 17:26

## 2021-01-01 RX ADMIN — Medication 5.44 MICROGRAM(S)/KG/MIN: at 06:50

## 2021-01-01 RX ADMIN — LACTULOSE 15 GRAM(S): 10 SOLUTION ORAL at 05:15

## 2021-01-01 RX ADMIN — POLYETHYLENE GLYCOL 3350 17 GRAM(S): 17 POWDER, FOR SOLUTION ORAL at 21:34

## 2021-01-01 RX ADMIN — Medication 250 MILLIGRAM(S): at 05:01

## 2021-01-01 RX ADMIN — Medication 1 APPLICATION(S): at 06:15

## 2021-01-01 RX ADMIN — Medication 25 MILLIGRAM(S): at 05:29

## 2021-01-01 RX ADMIN — Medication 81 MILLIGRAM(S): at 11:12

## 2021-01-01 RX ADMIN — ATORVASTATIN CALCIUM 40 MILLIGRAM(S): 80 TABLET, FILM COATED ORAL at 21:53

## 2021-01-01 RX ADMIN — Medication 1 APPLICATION(S): at 18:28

## 2021-01-01 RX ADMIN — LACTULOSE 15 GRAM(S): 10 SOLUTION ORAL at 05:35

## 2021-01-01 RX ADMIN — Medication 1 GRAM(S): at 22:36

## 2021-01-01 RX ADMIN — Medication 250 MILLIGRAM(S): at 17:28

## 2021-01-01 RX ADMIN — MIDODRINE HYDROCHLORIDE 10 MILLIGRAM(S): 2.5 TABLET ORAL at 05:16

## 2021-01-01 RX ADMIN — AMIODARONE HYDROCHLORIDE 200 MILLIGRAM(S): 400 TABLET ORAL at 17:40

## 2021-01-01 RX ADMIN — Medication 5 MILLIGRAM(S): at 21:32

## 2021-01-01 RX ADMIN — Medication 1 APPLICATION(S): at 17:35

## 2021-01-01 RX ADMIN — Medication 5 MILLIGRAM(S): at 22:46

## 2021-01-01 RX ADMIN — CHLORHEXIDINE GLUCONATE 15 MILLILITER(S): 213 SOLUTION TOPICAL at 05:51

## 2021-01-01 RX ADMIN — LEVETIRACETAM 415 MILLIGRAM(S): 250 TABLET, FILM COATED ORAL at 18:03

## 2021-01-01 RX ADMIN — Medication 50 MILLIEQUIVALENT(S): at 08:19

## 2021-01-01 RX ADMIN — PROPOFOL 3.96 MICROGRAM(S)/KG/MIN: 10 INJECTION, EMULSION INTRAVENOUS at 01:46

## 2021-01-01 RX ADMIN — CHLORHEXIDINE GLUCONATE 1 APPLICATION(S): 213 SOLUTION TOPICAL at 05:07

## 2021-01-01 RX ADMIN — CHLORHEXIDINE GLUCONATE 1 APPLICATION(S): 213 SOLUTION TOPICAL at 05:00

## 2021-01-01 RX ADMIN — MUPIROCIN 1 APPLICATION(S): 20 OINTMENT TOPICAL at 21:29

## 2021-01-01 RX ADMIN — LEVETIRACETAM 415 MILLIGRAM(S): 250 TABLET, FILM COATED ORAL at 18:52

## 2021-01-01 RX ADMIN — CHLORHEXIDINE GLUCONATE 1 APPLICATION(S): 213 SOLUTION TOPICAL at 04:54

## 2021-01-01 RX ADMIN — FENTANYL CITRATE 25 MICROGRAM(S): 50 INJECTION INTRAVENOUS at 04:47

## 2021-01-01 RX ADMIN — MIDODRINE HYDROCHLORIDE 10 MILLIGRAM(S): 2.5 TABLET ORAL at 06:04

## 2021-01-01 RX ADMIN — HEPARIN SODIUM 11 UNIT(S)/HR: 5000 INJECTION INTRAVENOUS; SUBCUTANEOUS at 06:22

## 2021-01-01 RX ADMIN — AMIODARONE HYDROCHLORIDE 200 MILLIGRAM(S): 400 TABLET ORAL at 18:16

## 2021-01-01 RX ADMIN — CHLORHEXIDINE GLUCONATE 15 MILLILITER(S): 213 SOLUTION TOPICAL at 17:21

## 2021-01-01 RX ADMIN — LACTULOSE 15 GRAM(S): 10 SOLUTION ORAL at 05:33

## 2021-01-01 RX ADMIN — Medication 1 APPLICATION(S): at 05:17

## 2021-01-01 RX ADMIN — Medication 6.19 MICROGRAM(S)/KG/MIN: at 09:10

## 2021-01-01 RX ADMIN — Medication 1 APPLICATION(S): at 17:27

## 2021-01-01 RX ADMIN — MIDODRINE HYDROCHLORIDE 5 MILLIGRAM(S): 2.5 TABLET ORAL at 16:01

## 2021-01-01 RX ADMIN — INSULIN GLARGINE 5 UNIT(S): 100 INJECTION, SOLUTION SUBCUTANEOUS at 21:08

## 2021-01-01 RX ADMIN — LEVETIRACETAM 415 MILLIGRAM(S): 250 TABLET, FILM COATED ORAL at 05:03

## 2021-01-01 RX ADMIN — HYDROMORPHONE HYDROCHLORIDE 0.5 MILLIGRAM(S): 2 INJECTION INTRAMUSCULAR; INTRAVENOUS; SUBCUTANEOUS at 06:05

## 2021-01-01 RX ADMIN — Medication 650 MILLIGRAM(S): at 11:07

## 2021-01-01 RX ADMIN — PIPERACILLIN AND TAZOBACTAM 200 GRAM(S): 4; .5 INJECTION, POWDER, LYOPHILIZED, FOR SOLUTION INTRAVENOUS at 15:20

## 2021-01-01 RX ADMIN — POLYETHYLENE GLYCOL 3350 17 GRAM(S): 17 POWDER, FOR SOLUTION ORAL at 22:31

## 2021-01-01 RX ADMIN — SODIUM CHLORIDE 75 MILLILITER(S): 9 INJECTION INTRAMUSCULAR; INTRAVENOUS; SUBCUTANEOUS at 13:15

## 2021-01-01 RX ADMIN — CHLORHEXIDINE GLUCONATE 15 MILLILITER(S): 213 SOLUTION TOPICAL at 17:18

## 2021-01-01 RX ADMIN — Medication 1 APPLICATION(S): at 18:26

## 2021-01-01 RX ADMIN — ATORVASTATIN CALCIUM 20 MILLIGRAM(S): 80 TABLET, FILM COATED ORAL at 21:00

## 2021-01-01 RX ADMIN — Medication 81 MILLIGRAM(S): at 11:31

## 2021-01-01 RX ADMIN — Medication 4: at 22:46

## 2021-01-01 RX ADMIN — CHLORHEXIDINE GLUCONATE 15 MILLILITER(S): 213 SOLUTION TOPICAL at 18:14

## 2021-01-01 RX ADMIN — FENTANYL CITRATE 2.9 MICROGRAM(S)/KG/HR: 50 INJECTION INTRAVENOUS at 07:06

## 2021-01-01 RX ADMIN — Medication 250 MILLIGRAM(S): at 05:21

## 2021-01-01 RX ADMIN — LACTULOSE 15 GRAM(S): 10 SOLUTION ORAL at 05:19

## 2021-01-01 RX ADMIN — DEXMEDETOMIDINE HYDROCHLORIDE IN 0.9% SODIUM CHLORIDE 3.3 MICROGRAM(S)/KG/HR: 4 INJECTION INTRAVENOUS at 02:48

## 2021-01-01 RX ADMIN — AMIODARONE HYDROCHLORIDE 200 MILLIGRAM(S): 400 TABLET ORAL at 05:35

## 2021-01-01 RX ADMIN — HYDROMORPHONE HYDROCHLORIDE 0.5 MILLIGRAM(S): 2 INJECTION INTRAMUSCULAR; INTRAVENOUS; SUBCUTANEOUS at 02:15

## 2021-01-01 RX ADMIN — MORPHINE SULFATE 2 MILLIGRAM(S): 50 CAPSULE, EXTENDED RELEASE ORAL at 08:41

## 2021-01-01 RX ADMIN — Medication 1 APPLICATION(S): at 17:10

## 2021-01-01 RX ADMIN — Medication 1 APPLICATION(S): at 05:32

## 2021-01-01 RX ADMIN — Medication 50 GRAM(S): at 08:13

## 2021-01-01 RX ADMIN — Medication 1 APPLICATION(S): at 17:46

## 2021-01-01 RX ADMIN — MUPIROCIN 1 APPLICATION(S): 20 OINTMENT TOPICAL at 18:15

## 2021-01-01 RX ADMIN — AMIODARONE HYDROCHLORIDE 200 MILLIGRAM(S): 400 TABLET ORAL at 05:51

## 2021-01-01 RX ADMIN — Medication 1 APPLICATION(S): at 05:12

## 2021-01-01 RX ADMIN — Medication 250 MILLIGRAM(S): at 06:29

## 2021-01-01 RX ADMIN — PIPERACILLIN AND TAZOBACTAM 200 GRAM(S): 4; .5 INJECTION, POWDER, LYOPHILIZED, FOR SOLUTION INTRAVENOUS at 13:01

## 2021-01-01 RX ADMIN — Medication 250 MILLIGRAM(S): at 06:15

## 2021-01-01 RX ADMIN — SENNA PLUS 2 TABLET(S): 8.6 TABLET ORAL at 11:52

## 2021-01-01 RX ADMIN — MORPHINE SULFATE 2 MILLIGRAM(S): 50 CAPSULE, EXTENDED RELEASE ORAL at 09:21

## 2021-01-01 RX ADMIN — LEVETIRACETAM 400 MILLIGRAM(S): 250 TABLET, FILM COATED ORAL at 14:50

## 2021-01-01 RX ADMIN — Medication 2: at 22:08

## 2021-01-01 RX ADMIN — HYDROMORPHONE HYDROCHLORIDE 0.5 MILLIGRAM(S): 2 INJECTION INTRAMUSCULAR; INTRAVENOUS; SUBCUTANEOUS at 05:48

## 2021-01-01 RX ADMIN — CHLORHEXIDINE GLUCONATE 15 MILLILITER(S): 213 SOLUTION TOPICAL at 17:27

## 2021-01-01 RX ADMIN — DOXERCALCIFEROL 2 MICROGRAM(S): 2.5 CAPSULE ORAL at 11:10

## 2021-01-01 RX ADMIN — HEPARIN SODIUM 5000 UNIT(S): 5000 INJECTION INTRAVENOUS; SUBCUTANEOUS at 06:03

## 2021-01-01 RX ADMIN — HEPARIN SODIUM 5000 UNIT(S): 5000 INJECTION INTRAVENOUS; SUBCUTANEOUS at 18:16

## 2021-01-01 RX ADMIN — Medication 1 APPLICATION(S): at 18:03

## 2021-01-01 RX ADMIN — CHLORHEXIDINE GLUCONATE 15 MILLILITER(S): 213 SOLUTION TOPICAL at 18:03

## 2021-01-01 RX ADMIN — SODIUM CHLORIDE 2000 MILLILITER(S): 9 INJECTION INTRAMUSCULAR; INTRAVENOUS; SUBCUTANEOUS at 15:53

## 2021-01-01 RX ADMIN — LACTULOSE 15 GRAM(S): 10 SOLUTION ORAL at 05:17

## 2021-01-01 RX ADMIN — FENTANYL CITRATE 1 PATCH: 50 INJECTION INTRAVENOUS at 18:52

## 2021-01-01 RX ADMIN — SENNA PLUS 2 TABLET(S): 8.6 TABLET ORAL at 21:56

## 2021-01-01 RX ADMIN — LEVETIRACETAM 415 MILLIGRAM(S): 250 TABLET, FILM COATED ORAL at 05:43

## 2021-01-01 RX ADMIN — HYDROMORPHONE HYDROCHLORIDE 0.5 MILLIGRAM(S): 2 INJECTION INTRAMUSCULAR; INTRAVENOUS; SUBCUTANEOUS at 13:00

## 2021-01-01 RX ADMIN — HEPARIN SODIUM 5000 UNIT(S): 5000 INJECTION INTRAVENOUS; SUBCUTANEOUS at 05:22

## 2021-01-01 RX ADMIN — SEVELAMER CARBONATE 1600 MILLIGRAM(S): 2400 POWDER, FOR SUSPENSION ORAL at 14:13

## 2021-01-01 RX ADMIN — CHLORHEXIDINE GLUCONATE 1 APPLICATION(S): 213 SOLUTION TOPICAL at 05:26

## 2021-01-01 RX ADMIN — MIDODRINE HYDROCHLORIDE 10 MILLIGRAM(S): 2.5 TABLET ORAL at 06:09

## 2021-01-01 RX ADMIN — Medication 250 MILLIGRAM(S): at 05:32

## 2021-01-01 RX ADMIN — Medication 1 GRAM(S): at 22:32

## 2021-01-01 RX ADMIN — CHLORHEXIDINE GLUCONATE 15 MILLILITER(S): 213 SOLUTION TOPICAL at 06:11

## 2021-01-01 RX ADMIN — LEVETIRACETAM 415 MILLIGRAM(S): 250 TABLET, FILM COATED ORAL at 06:30

## 2021-01-01 RX ADMIN — PANTOPRAZOLE SODIUM 40 MILLIGRAM(S): 20 TABLET, DELAYED RELEASE ORAL at 11:25

## 2021-01-01 RX ADMIN — HYDROMORPHONE HYDROCHLORIDE 0.5 MILLIGRAM(S): 2 INJECTION INTRAMUSCULAR; INTRAVENOUS; SUBCUTANEOUS at 11:41

## 2021-01-01 RX ADMIN — DEXMEDETOMIDINE HYDROCHLORIDE IN 0.9% SODIUM CHLORIDE 3.3 MICROGRAM(S)/KG/HR: 4 INJECTION INTRAVENOUS at 05:06

## 2021-01-01 RX ADMIN — Medication 25 MILLIGRAM(S): at 17:14

## 2021-01-01 RX ADMIN — Medication 250 MILLIGRAM(S): at 17:09

## 2021-01-01 RX ADMIN — HEPARIN SODIUM 5000 UNIT(S): 5000 INJECTION INTRAVENOUS; SUBCUTANEOUS at 13:22

## 2021-01-01 RX ADMIN — HYDROMORPHONE HYDROCHLORIDE 0.5 MILLIGRAM(S): 2 INJECTION INTRAMUSCULAR; INTRAVENOUS; SUBCUTANEOUS at 18:01

## 2021-01-01 RX ADMIN — Medication 1 GRAM(S): at 21:35

## 2021-01-01 RX ADMIN — SENNA PLUS 2 TABLET(S): 8.6 TABLET ORAL at 11:32

## 2021-01-01 RX ADMIN — HEPARIN SODIUM 5000 UNIT(S): 5000 INJECTION INTRAVENOUS; SUBCUTANEOUS at 06:22

## 2021-01-01 RX ADMIN — AMIODARONE HYDROCHLORIDE 200 MILLIGRAM(S): 400 TABLET ORAL at 06:15

## 2021-01-01 RX ADMIN — Medication 250 MILLIGRAM(S): at 05:59

## 2021-01-01 RX ADMIN — Medication 1 APPLICATION(S): at 05:13

## 2021-01-01 RX ADMIN — AMIODARONE HYDROCHLORIDE 200 MILLIGRAM(S): 400 TABLET ORAL at 05:29

## 2021-01-01 RX ADMIN — DIVALPROEX SODIUM 250 MILLIGRAM(S): 500 TABLET, DELAYED RELEASE ORAL at 17:27

## 2021-01-01 RX ADMIN — CHLORHEXIDINE GLUCONATE 15 MILLILITER(S): 213 SOLUTION TOPICAL at 18:25

## 2021-01-01 RX ADMIN — HYDROMORPHONE HYDROCHLORIDE 0.5 MILLIGRAM(S): 2 INJECTION INTRAMUSCULAR; INTRAVENOUS; SUBCUTANEOUS at 12:27

## 2021-01-01 RX ADMIN — FENTANYL CITRATE 2.9 MICROGRAM(S)/KG/HR: 50 INJECTION INTRAVENOUS at 06:51

## 2021-01-01 RX ADMIN — CHLORHEXIDINE GLUCONATE 15 MILLILITER(S): 213 SOLUTION TOPICAL at 05:36

## 2021-01-01 RX ADMIN — LEVETIRACETAM 400 MILLIGRAM(S): 250 TABLET, FILM COATED ORAL at 11:43

## 2021-01-01 RX ADMIN — Medication 1 APPLICATION(S): at 17:00

## 2021-01-01 RX ADMIN — Medication 250 MILLIGRAM(S): at 17:38

## 2021-01-01 RX ADMIN — CEFTRIAXONE 100 MILLIGRAM(S): 500 INJECTION, POWDER, FOR SOLUTION INTRAMUSCULAR; INTRAVENOUS at 10:59

## 2021-01-01 RX ADMIN — LEVETIRACETAM 415 MILLIGRAM(S): 250 TABLET, FILM COATED ORAL at 17:13

## 2021-01-01 RX ADMIN — LEVETIRACETAM 415 MILLIGRAM(S): 250 TABLET, FILM COATED ORAL at 17:00

## 2021-01-01 RX ADMIN — AMIODARONE HYDROCHLORIDE 200 MILLIGRAM(S): 400 TABLET ORAL at 06:22

## 2021-01-01 RX ADMIN — Medication 81 MILLIGRAM(S): at 11:05

## 2021-01-01 RX ADMIN — HEPARIN SODIUM 11 UNIT(S)/HR: 5000 INJECTION INTRAVENOUS; SUBCUTANEOUS at 18:00

## 2021-01-01 RX ADMIN — MIDODRINE HYDROCHLORIDE 5 MILLIGRAM(S): 2.5 TABLET ORAL at 21:53

## 2021-01-01 RX ADMIN — AMIODARONE HYDROCHLORIDE 200 MILLIGRAM(S): 400 TABLET ORAL at 05:03

## 2021-01-01 RX ADMIN — MUPIROCIN 1 APPLICATION(S): 20 OINTMENT TOPICAL at 17:36

## 2021-01-01 RX ADMIN — SEVELAMER CARBONATE 1600 MILLIGRAM(S): 2400 POWDER, FOR SUSPENSION ORAL at 15:21

## 2021-01-01 RX ADMIN — PIPERACILLIN AND TAZOBACTAM 200 GRAM(S): 4; .5 INJECTION, POWDER, LYOPHILIZED, FOR SOLUTION INTRAVENOUS at 05:51

## 2021-01-01 RX ADMIN — POLYETHYLENE GLYCOL 3350 17 GRAM(S): 17 POWDER, FOR SOLUTION ORAL at 21:24

## 2021-01-01 RX ADMIN — HEPARIN SODIUM 5000 UNIT(S): 5000 INJECTION INTRAVENOUS; SUBCUTANEOUS at 21:32

## 2021-01-01 RX ADMIN — FENTANYL CITRATE 1 PATCH: 50 INJECTION INTRAVENOUS at 08:24

## 2021-01-01 RX ADMIN — CHLORHEXIDINE GLUCONATE 1 APPLICATION(S): 213 SOLUTION TOPICAL at 05:05

## 2021-01-01 RX ADMIN — Medication 1 APPLICATION(S): at 05:05

## 2021-01-01 RX ADMIN — SENNA PLUS 2 TABLET(S): 8.6 TABLET ORAL at 21:24

## 2021-01-01 RX ADMIN — MUPIROCIN 1 APPLICATION(S): 20 OINTMENT TOPICAL at 05:04

## 2021-01-01 RX ADMIN — LEVETIRACETAM 420 MILLIGRAM(S): 250 TABLET, FILM COATED ORAL at 09:20

## 2021-01-01 RX ADMIN — HEPARIN SODIUM 12 UNIT(S)/HR: 5000 INJECTION INTRAVENOUS; SUBCUTANEOUS at 05:44

## 2021-01-01 RX ADMIN — CEFTRIAXONE 100 MILLIGRAM(S): 500 INJECTION, POWDER, FOR SOLUTION INTRAMUSCULAR; INTRAVENOUS at 08:31

## 2021-01-01 RX ADMIN — Medication 1 APPLICATION(S): at 05:21

## 2021-01-01 RX ADMIN — Medication 6.19 MICROGRAM(S)/KG/MIN: at 09:49

## 2021-01-01 RX ADMIN — POLYETHYLENE GLYCOL 3350 17 GRAM(S): 17 POWDER, FOR SOLUTION ORAL at 21:56

## 2021-01-01 RX ADMIN — ATORVASTATIN CALCIUM 40 MILLIGRAM(S): 80 TABLET, FILM COATED ORAL at 21:07

## 2021-01-01 RX ADMIN — SODIUM CHLORIDE 50 MILLILITER(S): 9 INJECTION, SOLUTION INTRAVENOUS at 05:02

## 2021-01-01 RX ADMIN — HYDROMORPHONE HYDROCHLORIDE 0.5 MILLIGRAM(S): 2 INJECTION INTRAMUSCULAR; INTRAVENOUS; SUBCUTANEOUS at 00:03

## 2021-01-01 RX ADMIN — CHLORHEXIDINE GLUCONATE 1 APPLICATION(S): 213 SOLUTION TOPICAL at 18:07

## 2021-01-01 RX ADMIN — Medication 81 MILLIGRAM(S): at 13:17

## 2021-01-01 RX ADMIN — ATORVASTATIN CALCIUM 40 MILLIGRAM(S): 80 TABLET, FILM COATED ORAL at 12:11

## 2021-01-01 RX ADMIN — Medication 5 MILLIGRAM(S): at 16:51

## 2021-01-01 RX ADMIN — Medication 1 GRAM(S): at 22:50

## 2021-01-01 RX ADMIN — Medication 650 MILLIGRAM(S): at 00:26

## 2021-01-01 RX ADMIN — Medication 2: at 21:01

## 2021-01-01 RX ADMIN — PIPERACILLIN AND TAZOBACTAM 200 GRAM(S): 4; .5 INJECTION, POWDER, LYOPHILIZED, FOR SOLUTION INTRAVENOUS at 05:30

## 2021-01-01 RX ADMIN — HEPARIN SODIUM 5000 UNIT(S): 5000 INJECTION INTRAVENOUS; SUBCUTANEOUS at 21:17

## 2021-01-01 RX ADMIN — LACTULOSE 15 GRAM(S): 10 SOLUTION ORAL at 21:51

## 2021-01-01 RX ADMIN — DOXERCALCIFEROL 2 MICROGRAM(S): 2.5 CAPSULE ORAL at 12:57

## 2021-01-01 RX ADMIN — SERTRALINE 50 MILLIGRAM(S): 25 TABLET, FILM COATED ORAL at 10:49

## 2021-01-01 RX ADMIN — LEVETIRACETAM 400 MILLIGRAM(S): 250 TABLET, FILM COATED ORAL at 14:46

## 2021-01-01 RX ADMIN — Medication 250 MILLIGRAM(S): at 18:16

## 2021-01-01 RX ADMIN — PIPERACILLIN AND TAZOBACTAM 200 GRAM(S): 4; .5 INJECTION, POWDER, LYOPHILIZED, FOR SOLUTION INTRAVENOUS at 21:02

## 2021-01-01 RX ADMIN — DIVALPROEX SODIUM 250 MILLIGRAM(S): 500 TABLET, DELAYED RELEASE ORAL at 18:03

## 2021-01-01 RX ADMIN — Medication 250 MILLIGRAM(S): at 18:05

## 2021-01-01 RX ADMIN — MIDODRINE HYDROCHLORIDE 5 MILLIGRAM(S): 2.5 TABLET ORAL at 22:35

## 2021-01-01 RX ADMIN — ATORVASTATIN CALCIUM 40 MILLIGRAM(S): 80 TABLET, FILM COATED ORAL at 11:32

## 2021-01-01 RX ADMIN — HEPARIN SODIUM 1300 UNIT(S)/HR: 5000 INJECTION INTRAVENOUS; SUBCUTANEOUS at 15:01

## 2021-01-01 RX ADMIN — Medication 2: at 18:25

## 2021-01-01 RX ADMIN — HYDROMORPHONE HYDROCHLORIDE 0.5 MILLIGRAM(S): 2 INJECTION INTRAMUSCULAR; INTRAVENOUS; SUBCUTANEOUS at 00:30

## 2021-01-01 RX ADMIN — Medication 25 MILLIGRAM(S): at 17:11

## 2021-01-01 RX ADMIN — CHLORHEXIDINE GLUCONATE 15 MILLILITER(S): 213 SOLUTION TOPICAL at 17:10

## 2021-01-01 RX ADMIN — MUPIROCIN 1 APPLICATION(S): 20 OINTMENT TOPICAL at 05:06

## 2021-01-01 RX ADMIN — Medication 81 MILLIGRAM(S): at 12:16

## 2021-01-01 RX ADMIN — Medication 81 MILLIGRAM(S): at 11:40

## 2021-01-01 RX ADMIN — PANTOPRAZOLE SODIUM 40 MILLIGRAM(S): 20 TABLET, DELAYED RELEASE ORAL at 11:06

## 2021-01-01 RX ADMIN — LEVETIRACETAM 415 MILLIGRAM(S): 250 TABLET, FILM COATED ORAL at 17:10

## 2021-01-01 RX ADMIN — Medication 2: at 06:15

## 2021-01-01 RX ADMIN — CHLORHEXIDINE GLUCONATE 15 MILLILITER(S): 213 SOLUTION TOPICAL at 06:05

## 2021-01-01 RX ADMIN — Medication 250 MILLIGRAM(S): at 18:25

## 2021-01-01 RX ADMIN — SEVELAMER CARBONATE 1600 MILLIGRAM(S): 2400 POWDER, FOR SUSPENSION ORAL at 13:20

## 2021-01-01 RX ADMIN — SEVELAMER CARBONATE 1600 MILLIGRAM(S): 2400 POWDER, FOR SUSPENSION ORAL at 13:21

## 2021-01-01 RX ADMIN — SERTRALINE 50 MILLIGRAM(S): 25 TABLET, FILM COATED ORAL at 17:34

## 2021-01-01 RX ADMIN — Medication 80 MILLIGRAM(S): at 05:01

## 2021-01-01 RX ADMIN — ATORVASTATIN CALCIUM 40 MILLIGRAM(S): 80 TABLET, FILM COATED ORAL at 22:43

## 2021-01-01 RX ADMIN — AMIODARONE HYDROCHLORIDE 200 MILLIGRAM(S): 400 TABLET ORAL at 17:34

## 2021-01-01 RX ADMIN — ERYTHROPOIETIN 10000 UNIT(S): 10000 INJECTION, SOLUTION INTRAVENOUS; SUBCUTANEOUS at 08:47

## 2021-01-01 RX ADMIN — ATORVASTATIN CALCIUM 40 MILLIGRAM(S): 80 TABLET, FILM COATED ORAL at 12:15

## 2021-01-01 RX ADMIN — AMIODARONE HYDROCHLORIDE 200 MILLIGRAM(S): 400 TABLET ORAL at 17:00

## 2021-01-01 RX ADMIN — ERYTHROPOIETIN 10000 UNIT(S): 10000 INJECTION, SOLUTION INTRAVENOUS; SUBCUTANEOUS at 11:54

## 2021-01-01 RX ADMIN — Medication 250 MILLIGRAM(S): at 18:14

## 2021-01-01 RX ADMIN — POLYETHYLENE GLYCOL 3350 17 GRAM(S): 17 POWDER, FOR SOLUTION ORAL at 21:53

## 2021-01-01 RX ADMIN — HEPARIN SODIUM 5000 UNIT(S): 5000 INJECTION INTRAVENOUS; SUBCUTANEOUS at 17:38

## 2021-01-01 RX ADMIN — MUPIROCIN 1 APPLICATION(S): 20 OINTMENT TOPICAL at 17:27

## 2021-01-01 RX ADMIN — POLYETHYLENE GLYCOL 3350 17 GRAM(S): 17 POWDER, FOR SOLUTION ORAL at 21:02

## 2021-01-01 RX ADMIN — LEVETIRACETAM 400 MILLIGRAM(S): 250 TABLET, FILM COATED ORAL at 12:22

## 2021-01-01 RX ADMIN — AMIODARONE HYDROCHLORIDE 200 MILLIGRAM(S): 400 TABLET ORAL at 18:03

## 2021-01-01 RX ADMIN — Medication 1 APPLICATION(S): at 17:32

## 2021-01-01 RX ADMIN — PANTOPRAZOLE SODIUM 40 MILLIGRAM(S): 20 TABLET, DELAYED RELEASE ORAL at 12:27

## 2021-01-01 RX ADMIN — ERYTHROPOIETIN 10000 UNIT(S): 10000 INJECTION, SOLUTION INTRAVENOUS; SUBCUTANEOUS at 12:58

## 2021-01-01 RX ADMIN — HEPARIN SODIUM 5000 UNIT(S): 5000 INJECTION INTRAVENOUS; SUBCUTANEOUS at 21:24

## 2021-01-01 RX ADMIN — HEPARIN SODIUM 5000 UNIT(S): 5000 INJECTION INTRAVENOUS; SUBCUTANEOUS at 15:13

## 2021-01-01 RX ADMIN — FENTANYL CITRATE 25 MICROGRAM(S): 50 INJECTION INTRAVENOUS at 08:29

## 2021-01-01 RX ADMIN — Medication 80 MILLIGRAM(S): at 06:18

## 2021-01-01 RX ADMIN — PIPERACILLIN AND TAZOBACTAM 200 GRAM(S): 4; .5 INJECTION, POWDER, LYOPHILIZED, FOR SOLUTION INTRAVENOUS at 22:47

## 2021-01-01 RX ADMIN — Medication 1 APPLICATION(S): at 06:05

## 2021-01-01 RX ADMIN — AMIODARONE HYDROCHLORIDE 200 MILLIGRAM(S): 400 TABLET ORAL at 05:36

## 2021-01-01 RX ADMIN — ERYTHROPOIETIN 10000 UNIT(S): 10000 INJECTION, SOLUTION INTRAVENOUS; SUBCUTANEOUS at 08:11

## 2021-01-01 RX ADMIN — FENTANYL CITRATE 2.9 MICROGRAM(S)/KG/HR: 50 INJECTION INTRAVENOUS at 15:05

## 2021-01-01 RX ADMIN — HEPARIN SODIUM 5000 UNIT(S): 5000 INJECTION INTRAVENOUS; SUBCUTANEOUS at 21:02

## 2021-01-01 RX ADMIN — PANTOPRAZOLE SODIUM 40 MILLIGRAM(S): 20 TABLET, DELAYED RELEASE ORAL at 11:32

## 2021-01-01 RX ADMIN — CHLORHEXIDINE GLUCONATE 15 MILLILITER(S): 213 SOLUTION TOPICAL at 05:05

## 2021-01-01 RX ADMIN — PANTOPRAZOLE SODIUM 40 MILLIGRAM(S): 20 TABLET, DELAYED RELEASE ORAL at 11:56

## 2021-01-01 RX ADMIN — Medication 5 MILLIGRAM(S): at 21:10

## 2021-01-01 RX ADMIN — SERTRALINE 50 MILLIGRAM(S): 25 TABLET, FILM COATED ORAL at 12:33

## 2021-01-01 RX ADMIN — ERYTHROPOIETIN 10000 UNIT(S): 10000 INJECTION, SOLUTION INTRAVENOUS; SUBCUTANEOUS at 13:13

## 2021-01-01 RX ADMIN — LEVETIRACETAM 400 MILLIGRAM(S): 250 TABLET, FILM COATED ORAL at 15:58

## 2021-01-01 RX ADMIN — Medication 650 MILLIGRAM(S): at 01:36

## 2021-01-01 RX ADMIN — CHLORHEXIDINE GLUCONATE 1 APPLICATION(S): 213 SOLUTION TOPICAL at 06:23

## 2021-01-01 RX ADMIN — Medication 1 APPLICATION(S): at 17:18

## 2021-01-01 RX ADMIN — ATORVASTATIN CALCIUM 40 MILLIGRAM(S): 80 TABLET, FILM COATED ORAL at 23:47

## 2021-01-01 RX ADMIN — AMIODARONE HYDROCHLORIDE 200 MILLIGRAM(S): 400 TABLET ORAL at 16:48

## 2021-01-01 RX ADMIN — LEVETIRACETAM 415 MILLIGRAM(S): 250 TABLET, FILM COATED ORAL at 05:00

## 2021-01-01 RX ADMIN — MORPHINE SULFATE 2 MILLIGRAM(S): 50 CAPSULE, EXTENDED RELEASE ORAL at 10:46

## 2021-01-01 RX ADMIN — LACTULOSE 15 GRAM(S): 10 SOLUTION ORAL at 15:23

## 2021-01-01 RX ADMIN — LACTULOSE 15 GRAM(S): 10 SOLUTION ORAL at 06:16

## 2021-01-01 RX ADMIN — MIDODRINE HYDROCHLORIDE 5 MILLIGRAM(S): 2.5 TABLET ORAL at 05:20

## 2021-01-01 RX ADMIN — Medication 4: at 21:52

## 2021-01-01 RX ADMIN — LACTULOSE 15 GRAM(S): 10 SOLUTION ORAL at 21:34

## 2021-01-01 RX ADMIN — HEPARIN SODIUM 5000 UNIT(S): 5000 INJECTION INTRAVENOUS; SUBCUTANEOUS at 05:01

## 2021-01-01 RX ADMIN — Medication 250 MILLIGRAM(S): at 17:19

## 2021-01-01 RX ADMIN — Medication 1 GRAM(S): at 22:24

## 2021-01-01 RX ADMIN — HYDROMORPHONE HYDROCHLORIDE 0.5 MILLIGRAM(S): 2 INJECTION INTRAMUSCULAR; INTRAVENOUS; SUBCUTANEOUS at 12:10

## 2021-01-01 RX ADMIN — Medication 81 MILLIGRAM(S): at 11:55

## 2021-01-01 RX ADMIN — Medication 1 APPLICATION(S): at 05:18

## 2021-01-01 RX ADMIN — CEFTRIAXONE 100 MILLIGRAM(S): 500 INJECTION, POWDER, FOR SOLUTION INTRAMUSCULAR; INTRAVENOUS at 09:22

## 2021-01-01 RX ADMIN — Medication 1 APPLICATION(S): at 17:57

## 2021-01-01 RX ADMIN — MIDODRINE HYDROCHLORIDE 10 MILLIGRAM(S): 2.5 TABLET ORAL at 22:31

## 2021-01-01 RX ADMIN — MORPHINE SULFATE 6 MILLIGRAM(S): 50 CAPSULE, EXTENDED RELEASE ORAL at 20:42

## 2021-01-01 RX ADMIN — Medication 2: at 16:25

## 2021-01-01 RX ADMIN — PANTOPRAZOLE SODIUM 40 MILLIGRAM(S): 20 TABLET, DELAYED RELEASE ORAL at 11:47

## 2021-01-01 RX ADMIN — SERTRALINE 50 MILLIGRAM(S): 25 TABLET, FILM COATED ORAL at 12:42

## 2021-01-01 RX ADMIN — HEPARIN SODIUM 5000 UNIT(S): 5000 INJECTION INTRAVENOUS; SUBCUTANEOUS at 22:47

## 2021-01-01 RX ADMIN — PANTOPRAZOLE SODIUM 40 MILLIGRAM(S): 20 TABLET, DELAYED RELEASE ORAL at 12:09

## 2021-01-01 RX ADMIN — CHLORHEXIDINE GLUCONATE 1 APPLICATION(S): 213 SOLUTION TOPICAL at 05:03

## 2021-01-01 RX ADMIN — CHLORHEXIDINE GLUCONATE 15 MILLILITER(S): 213 SOLUTION TOPICAL at 18:31

## 2021-01-01 RX ADMIN — LACTULOSE 15 GRAM(S): 10 SOLUTION ORAL at 05:49

## 2021-01-01 RX ADMIN — PANTOPRAZOLE SODIUM 40 MILLIGRAM(S): 20 TABLET, DELAYED RELEASE ORAL at 11:58

## 2021-01-01 RX ADMIN — ATORVASTATIN CALCIUM 40 MILLIGRAM(S): 80 TABLET, FILM COATED ORAL at 11:39

## 2021-01-01 RX ADMIN — SENNA PLUS 2 TABLET(S): 8.6 TABLET ORAL at 21:34

## 2021-01-01 RX ADMIN — Medication 1 APPLICATION(S): at 05:28

## 2021-01-01 RX ADMIN — LACTULOSE 15 GRAM(S): 10 SOLUTION ORAL at 06:05

## 2021-01-01 RX ADMIN — HEPARIN SODIUM 5000 UNIT(S): 5000 INJECTION INTRAVENOUS; SUBCUTANEOUS at 14:59

## 2021-01-01 RX ADMIN — Medication 1 APPLICATION(S): at 18:02

## 2021-01-01 RX ADMIN — MIDODRINE HYDROCHLORIDE 10 MILLIGRAM(S): 2.5 TABLET ORAL at 15:41

## 2021-01-01 RX ADMIN — CHLORHEXIDINE GLUCONATE 1 APPLICATION(S): 213 SOLUTION TOPICAL at 05:01

## 2021-01-01 RX ADMIN — CHLORHEXIDINE GLUCONATE 15 MILLILITER(S): 213 SOLUTION TOPICAL at 17:34

## 2021-01-01 RX ADMIN — Medication 25 MILLIGRAM(S): at 17:10

## 2021-01-01 RX ADMIN — MEROPENEM 100 MILLIGRAM(S): 1 INJECTION INTRAVENOUS at 06:18

## 2021-01-01 RX ADMIN — FENTANYL CITRATE 1 PATCH: 50 INJECTION INTRAVENOUS at 17:10

## 2021-01-01 RX ADMIN — Medication 1000 MILLIGRAM(S): at 03:52

## 2021-01-01 RX ADMIN — Medication 650 MILLIGRAM(S): at 19:52

## 2021-01-01 RX ADMIN — Medication 4: at 09:06

## 2021-01-01 RX ADMIN — Medication 650 MILLIGRAM(S): at 15:52

## 2021-01-01 RX ADMIN — LACTULOSE 15 GRAM(S): 10 SOLUTION ORAL at 21:15

## 2021-01-01 RX ADMIN — DEXMEDETOMIDINE HYDROCHLORIDE IN 0.9% SODIUM CHLORIDE 3.3 MICROGRAM(S)/KG/HR: 4 INJECTION INTRAVENOUS at 09:00

## 2021-01-01 RX ADMIN — SENNA PLUS 2 TABLET(S): 8.6 TABLET ORAL at 11:55

## 2021-01-01 RX ADMIN — PROPOFOL 3.96 MICROGRAM(S)/KG/MIN: 10 INJECTION, EMULSION INTRAVENOUS at 18:16

## 2021-01-01 RX ADMIN — HEPARIN SODIUM 13 UNIT(S)/HR: 5000 INJECTION INTRAVENOUS; SUBCUTANEOUS at 10:30

## 2021-01-01 RX ADMIN — MIDODRINE HYDROCHLORIDE 10 MILLIGRAM(S): 2.5 TABLET ORAL at 21:34

## 2021-01-01 RX ADMIN — LEVETIRACETAM 415 MILLIGRAM(S): 250 TABLET, FILM COATED ORAL at 05:15

## 2021-01-01 RX ADMIN — SENNA PLUS 2 TABLET(S): 8.6 TABLET ORAL at 11:28

## 2021-01-01 RX ADMIN — SEVELAMER CARBONATE 1600 MILLIGRAM(S): 2400 POWDER, FOR SUSPENSION ORAL at 21:02

## 2021-01-01 RX ADMIN — HYDROMORPHONE HYDROCHLORIDE 0.5 MILLIGRAM(S): 2 INJECTION INTRAMUSCULAR; INTRAVENOUS; SUBCUTANEOUS at 05:57

## 2021-01-01 RX ADMIN — Medication 25 MILLIGRAM(S): at 17:03

## 2021-01-01 RX ADMIN — Medication 2: at 12:32

## 2021-01-01 RX ADMIN — SERTRALINE 50 MILLIGRAM(S): 25 TABLET, FILM COATED ORAL at 12:28

## 2021-01-01 RX ADMIN — CHLORHEXIDINE GLUCONATE 1 APPLICATION(S): 213 SOLUTION TOPICAL at 06:00

## 2021-01-01 RX ADMIN — Medication 5 MILLIGRAM(S): at 10:20

## 2021-01-01 RX ADMIN — LACTULOSE 15 GRAM(S): 10 SOLUTION ORAL at 21:24

## 2021-01-01 RX ADMIN — CHLORHEXIDINE GLUCONATE 15 MILLILITER(S): 213 SOLUTION TOPICAL at 18:27

## 2021-01-01 RX ADMIN — SODIUM CHLORIDE 50 MILLILITER(S): 9 INJECTION, SOLUTION INTRAVENOUS at 13:12

## 2021-01-01 RX ADMIN — ERYTHROPOIETIN 10000 UNIT(S): 10000 INJECTION, SOLUTION INTRAVENOUS; SUBCUTANEOUS at 14:37

## 2021-01-01 RX ADMIN — MIDODRINE HYDROCHLORIDE 10 MILLIGRAM(S): 2.5 TABLET ORAL at 05:05

## 2021-01-01 RX ADMIN — Medication 100 MILLIGRAM(S): at 18:51

## 2021-01-01 RX ADMIN — Medication 100 MILLIGRAM(S): at 05:58

## 2021-01-01 RX ADMIN — CHLORHEXIDINE GLUCONATE 15 MILLILITER(S): 213 SOLUTION TOPICAL at 18:49

## 2021-01-01 RX ADMIN — Medication 250 MILLIGRAM(S): at 05:33

## 2021-01-01 RX ADMIN — Medication 100 MILLIEQUIVALENT(S): at 08:13

## 2021-01-01 RX ADMIN — AMIODARONE HYDROCHLORIDE 200 MILLIGRAM(S): 400 TABLET ORAL at 05:20

## 2021-01-01 RX ADMIN — FENTANYL CITRATE 2.9 MICROGRAM(S)/KG/HR: 50 INJECTION INTRAVENOUS at 07:45

## 2021-01-01 RX ADMIN — Medication 100 MILLIEQUIVALENT(S): at 09:48

## 2021-01-01 RX ADMIN — AMIODARONE HYDROCHLORIDE 200 MILLIGRAM(S): 400 TABLET ORAL at 17:28

## 2021-01-01 RX ADMIN — Medication 250 MILLIGRAM(S): at 17:29

## 2021-01-01 RX ADMIN — PANTOPRAZOLE SODIUM 40 MILLIGRAM(S): 20 TABLET, DELAYED RELEASE ORAL at 11:43

## 2021-01-01 RX ADMIN — Medication 650 MILLIGRAM(S): at 10:09

## 2021-01-01 RX ADMIN — MIDODRINE HYDROCHLORIDE 10 MILLIGRAM(S): 2.5 TABLET ORAL at 13:15

## 2021-01-01 RX ADMIN — LEVETIRACETAM 415 MILLIGRAM(S): 250 TABLET, FILM COATED ORAL at 05:25

## 2021-01-01 RX ADMIN — HEPARIN SODIUM 5000 UNIT(S): 5000 INJECTION INTRAVENOUS; SUBCUTANEOUS at 13:37

## 2021-01-01 RX ADMIN — PANTOPRAZOLE SODIUM 40 MILLIGRAM(S): 20 TABLET, DELAYED RELEASE ORAL at 11:40

## 2021-01-01 RX ADMIN — LEVETIRACETAM 400 MILLIGRAM(S): 250 TABLET, FILM COATED ORAL at 15:45

## 2021-01-01 RX ADMIN — LEVETIRACETAM 415 MILLIGRAM(S): 250 TABLET, FILM COATED ORAL at 05:21

## 2021-01-01 RX ADMIN — LEVETIRACETAM 415 MILLIGRAM(S): 250 TABLET, FILM COATED ORAL at 06:04

## 2021-01-01 RX ADMIN — SEVELAMER CARBONATE 1600 MILLIGRAM(S): 2400 POWDER, FOR SUSPENSION ORAL at 05:22

## 2021-01-01 RX ADMIN — FENTANYL CITRATE 1 PATCH: 50 INJECTION INTRAVENOUS at 16:40

## 2021-01-01 RX ADMIN — LEVETIRACETAM 415 MILLIGRAM(S): 250 TABLET, FILM COATED ORAL at 05:20

## 2021-01-01 RX ADMIN — HEPARIN SODIUM 5000 UNIT(S): 5000 INJECTION INTRAVENOUS; SUBCUTANEOUS at 14:48

## 2021-01-01 RX ADMIN — Medication 81 MILLIGRAM(S): at 11:30

## 2021-01-01 RX ADMIN — Medication 100 GRAM(S): at 17:37

## 2021-01-01 RX ADMIN — CHLORHEXIDINE GLUCONATE 15 MILLILITER(S): 213 SOLUTION TOPICAL at 05:03

## 2021-01-01 RX ADMIN — HYDROMORPHONE HYDROCHLORIDE 0.5 MILLIGRAM(S): 2 INJECTION INTRAMUSCULAR; INTRAVENOUS; SUBCUTANEOUS at 00:20

## 2021-01-01 RX ADMIN — Medication 250 MILLIGRAM(S): at 05:15

## 2021-01-01 RX ADMIN — LACTULOSE 15 GRAM(S): 10 SOLUTION ORAL at 15:41

## 2021-01-01 RX ADMIN — PANTOPRAZOLE SODIUM 40 MILLIGRAM(S): 20 TABLET, DELAYED RELEASE ORAL at 12:42

## 2021-01-01 RX ADMIN — Medication 1 APPLICATION(S): at 17:34

## 2021-01-01 RX ADMIN — Medication 100 MILLIGRAM(S): at 02:18

## 2021-01-01 RX ADMIN — ERYTHROPOIETIN 10000 UNIT(S): 10000 INJECTION, SOLUTION INTRAVENOUS; SUBCUTANEOUS at 09:08

## 2021-01-01 RX ADMIN — CHLORHEXIDINE GLUCONATE 15 MILLILITER(S): 213 SOLUTION TOPICAL at 06:20

## 2021-01-01 RX ADMIN — Medication 2: at 12:30

## 2021-01-01 RX ADMIN — INSULIN GLARGINE 5 UNIT(S): 100 INJECTION, SOLUTION SUBCUTANEOUS at 21:05

## 2021-01-01 RX ADMIN — LACTULOSE 15 GRAM(S): 10 SOLUTION ORAL at 22:31

## 2021-01-01 RX ADMIN — Medication 81 MILLIGRAM(S): at 11:43

## 2021-01-01 RX ADMIN — Medication 81 MILLIGRAM(S): at 12:10

## 2021-01-01 RX ADMIN — CHLORHEXIDINE GLUCONATE 1 APPLICATION(S): 213 SOLUTION TOPICAL at 05:22

## 2021-01-01 RX ADMIN — Medication 25 MILLIGRAM(S): at 17:33

## 2021-01-01 RX ADMIN — HEPARIN SODIUM 5000 UNIT(S): 5000 INJECTION INTRAVENOUS; SUBCUTANEOUS at 21:10

## 2021-01-01 RX ADMIN — ATORVASTATIN CALCIUM 40 MILLIGRAM(S): 80 TABLET, FILM COATED ORAL at 12:14

## 2021-01-01 RX ADMIN — PROPOFOL 3.96 MICROGRAM(S)/KG/MIN: 10 INJECTION, EMULSION INTRAVENOUS at 14:33

## 2021-01-01 RX ADMIN — ATORVASTATIN CALCIUM 40 MILLIGRAM(S): 80 TABLET, FILM COATED ORAL at 11:11

## 2021-01-01 RX ADMIN — HYDROMORPHONE HYDROCHLORIDE 0.5 MILLIGRAM(S): 2 INJECTION INTRAMUSCULAR; INTRAVENOUS; SUBCUTANEOUS at 18:28

## 2021-01-01 RX ADMIN — INSULIN GLARGINE 5 UNIT(S): 100 INJECTION, SOLUTION SUBCUTANEOUS at 22:47

## 2021-01-01 RX ADMIN — HEPARIN SODIUM 5000 UNIT(S): 5000 INJECTION INTRAVENOUS; SUBCUTANEOUS at 13:11

## 2021-01-01 RX ADMIN — Medication 1 APPLICATION(S): at 05:19

## 2021-01-01 RX ADMIN — Medication 4: at 16:58

## 2021-01-01 RX ADMIN — Medication 1 APPLICATION(S): at 18:49

## 2021-01-01 RX ADMIN — LEVETIRACETAM 415 MILLIGRAM(S): 250 TABLET, FILM COATED ORAL at 05:05

## 2021-01-01 RX ADMIN — LEVETIRACETAM 415 MILLIGRAM(S): 250 TABLET, FILM COATED ORAL at 18:35

## 2021-01-01 RX ADMIN — DOXERCALCIFEROL 2 MICROGRAM(S): 2.5 CAPSULE ORAL at 08:11

## 2021-01-01 RX ADMIN — Medication 1 APPLICATION(S): at 06:00

## 2021-01-01 RX ADMIN — Medication 650 MILLIGRAM(S): at 16:28

## 2021-01-01 RX ADMIN — Medication 25 MILLIGRAM(S): at 12:16

## 2021-01-01 RX ADMIN — AMIODARONE HYDROCHLORIDE 200 MILLIGRAM(S): 400 TABLET ORAL at 05:22

## 2021-01-01 RX ADMIN — Medication 1 APPLICATION(S): at 06:04

## 2021-01-01 RX ADMIN — POLYETHYLENE GLYCOL 3350 17 GRAM(S): 17 POWDER, FOR SOLUTION ORAL at 22:36

## 2021-01-01 RX ADMIN — LEVETIRACETAM 415 MILLIGRAM(S): 250 TABLET, FILM COATED ORAL at 05:09

## 2021-01-01 RX ADMIN — SERTRALINE 50 MILLIGRAM(S): 25 TABLET, FILM COATED ORAL at 11:12

## 2021-01-01 RX ADMIN — PHENYLEPHRINE HYDROCHLORIDE 60 MICROGRAM(S): 10 INJECTION INTRAVENOUS at 18:29

## 2021-01-01 RX ADMIN — Medication 1 APPLICATION(S): at 17:49

## 2021-01-01 RX ADMIN — LEVETIRACETAM 415 MILLIGRAM(S): 250 TABLET, FILM COATED ORAL at 06:10

## 2021-01-01 RX ADMIN — SERTRALINE 50 MILLIGRAM(S): 25 TABLET, FILM COATED ORAL at 11:07

## 2021-01-01 RX ADMIN — Medication 30 MILLILITER(S): at 11:39

## 2021-01-01 RX ADMIN — Medication 250 MILLIGRAM(S): at 05:49

## 2021-01-01 RX ADMIN — SERTRALINE 50 MILLIGRAM(S): 25 TABLET, FILM COATED ORAL at 12:21

## 2021-01-01 RX ADMIN — ATORVASTATIN CALCIUM 40 MILLIGRAM(S): 80 TABLET, FILM COATED ORAL at 21:01

## 2021-01-01 RX ADMIN — Medication 1 APPLICATION(S): at 17:37

## 2021-01-01 RX ADMIN — CHLORHEXIDINE GLUCONATE 15 MILLILITER(S): 213 SOLUTION TOPICAL at 05:19

## 2021-01-01 RX ADMIN — PIPERACILLIN AND TAZOBACTAM 200 GRAM(S): 4; .5 INJECTION, POWDER, LYOPHILIZED, FOR SOLUTION INTRAVENOUS at 15:53

## 2021-01-01 RX ADMIN — LEVETIRACETAM 415 MILLIGRAM(S): 250 TABLET, FILM COATED ORAL at 17:28

## 2021-01-01 RX ADMIN — Medication 1 APPLICATION(S): at 05:27

## 2021-01-01 RX ADMIN — Medication 250 MILLIGRAM(S): at 17:47

## 2021-01-01 RX ADMIN — ATORVASTATIN CALCIUM 40 MILLIGRAM(S): 80 TABLET, FILM COATED ORAL at 18:05

## 2021-01-01 RX ADMIN — ATORVASTATIN CALCIUM 40 MILLIGRAM(S): 80 TABLET, FILM COATED ORAL at 21:24

## 2021-01-01 RX ADMIN — HEPARIN SODIUM 11 UNIT(S)/HR: 5000 INJECTION INTRAVENOUS; SUBCUTANEOUS at 05:17

## 2021-01-01 RX ADMIN — FENTANYL CITRATE 1 PATCH: 50 INJECTION INTRAVENOUS at 19:00

## 2021-01-01 RX ADMIN — SERTRALINE 50 MILLIGRAM(S): 25 TABLET, FILM COATED ORAL at 13:17

## 2021-01-01 RX ADMIN — LEVETIRACETAM 415 MILLIGRAM(S): 250 TABLET, FILM COATED ORAL at 05:50

## 2021-01-01 RX ADMIN — Medication 650 MILLIGRAM(S): at 08:57

## 2021-01-01 RX ADMIN — SERTRALINE 50 MILLIGRAM(S): 25 TABLET, FILM COATED ORAL at 12:24

## 2021-01-01 RX ADMIN — LEVETIRACETAM 415 MILLIGRAM(S): 250 TABLET, FILM COATED ORAL at 06:45

## 2021-01-01 RX ADMIN — MIDODRINE HYDROCHLORIDE 5 MILLIGRAM(S): 2.5 TABLET ORAL at 06:15

## 2021-01-01 RX ADMIN — ERYTHROPOIETIN 10000 UNIT(S): 10000 INJECTION, SOLUTION INTRAVENOUS; SUBCUTANEOUS at 14:33

## 2021-01-01 RX ADMIN — Medication 1 APPLICATION(S): at 18:32

## 2021-01-01 RX ADMIN — Medication 1 APPLICATION(S): at 06:10

## 2021-01-01 RX ADMIN — SEVELAMER CARBONATE 1600 MILLIGRAM(S): 2400 POWDER, FOR SUSPENSION ORAL at 05:12

## 2021-01-01 RX ADMIN — Medication 250 MILLIGRAM(S): at 17:40

## 2021-01-01 RX ADMIN — Medication 81 MILLIGRAM(S): at 10:49

## 2021-01-01 RX ADMIN — PIPERACILLIN AND TAZOBACTAM 200 GRAM(S): 4; .5 INJECTION, POWDER, LYOPHILIZED, FOR SOLUTION INTRAVENOUS at 14:30

## 2021-01-01 RX ADMIN — MEROPENEM 100 MILLIGRAM(S): 1 INJECTION INTRAVENOUS at 18:18

## 2021-01-01 RX ADMIN — Medication 2: at 06:04

## 2021-01-01 RX ADMIN — AMIODARONE HYDROCHLORIDE 200 MILLIGRAM(S): 400 TABLET ORAL at 12:16

## 2021-01-01 RX ADMIN — LEVETIRACETAM 415 MILLIGRAM(S): 250 TABLET, FILM COATED ORAL at 18:26

## 2021-01-01 RX ADMIN — Medication 25 MILLIGRAM(S): at 05:37

## 2021-01-01 RX ADMIN — HEPARIN SODIUM 13 UNIT(S)/HR: 5000 INJECTION INTRAVENOUS; SUBCUTANEOUS at 00:00

## 2021-01-01 RX ADMIN — Medication 10 MILLIGRAM(S): at 12:16

## 2021-01-01 RX ADMIN — Medication 250 MILLIGRAM(S): at 05:18

## 2021-01-01 RX ADMIN — MIDODRINE HYDROCHLORIDE 10 MILLIGRAM(S): 2.5 TABLET ORAL at 15:58

## 2021-01-01 RX ADMIN — CHLORHEXIDINE GLUCONATE 15 MILLILITER(S): 213 SOLUTION TOPICAL at 17:15

## 2021-01-01 RX ADMIN — CEFTRIAXONE 100 MILLIGRAM(S): 500 INJECTION, POWDER, FOR SOLUTION INTRAMUSCULAR; INTRAVENOUS at 09:20

## 2021-01-01 RX ADMIN — AMIODARONE HYDROCHLORIDE 200 MILLIGRAM(S): 400 TABLET ORAL at 18:14

## 2021-01-01 RX ADMIN — MIDAZOLAM HYDROCHLORIDE 2 MILLIGRAM(S): 1 INJECTION, SOLUTION INTRAMUSCULAR; INTRAVENOUS at 10:20

## 2021-01-01 RX ADMIN — HYDROMORPHONE HYDROCHLORIDE 0.5 MILLIGRAM(S): 2 INJECTION INTRAMUSCULAR; INTRAVENOUS; SUBCUTANEOUS at 12:41

## 2021-01-01 RX ADMIN — ATORVASTATIN CALCIUM 40 MILLIGRAM(S): 80 TABLET, FILM COATED ORAL at 11:28

## 2021-01-01 RX ADMIN — LEVETIRACETAM 400 MILLIGRAM(S): 250 TABLET, FILM COATED ORAL at 15:04

## 2021-01-01 RX ADMIN — LEVETIRACETAM 415 MILLIGRAM(S): 250 TABLET, FILM COATED ORAL at 06:33

## 2021-01-01 RX ADMIN — CEFTRIAXONE 100 MILLIGRAM(S): 500 INJECTION, POWDER, FOR SOLUTION INTRAMUSCULAR; INTRAVENOUS at 08:58

## 2021-01-01 RX ADMIN — MUPIROCIN 1 APPLICATION(S): 20 OINTMENT TOPICAL at 18:03

## 2021-01-01 RX ADMIN — PANTOPRAZOLE SODIUM 40 MILLIGRAM(S): 20 TABLET, DELAYED RELEASE ORAL at 06:27

## 2021-01-01 RX ADMIN — ATORVASTATIN CALCIUM 20 MILLIGRAM(S): 80 TABLET, FILM COATED ORAL at 21:32

## 2021-01-01 RX ADMIN — ATORVASTATIN CALCIUM 20 MILLIGRAM(S): 80 TABLET, FILM COATED ORAL at 21:10

## 2021-01-01 RX ADMIN — FENTANYL CITRATE 2.9 MICROGRAM(S)/KG/HR: 50 INJECTION INTRAVENOUS at 20:37

## 2021-01-01 RX ADMIN — Medication 25 MILLIGRAM(S): at 05:22

## 2021-01-01 RX ADMIN — DOXERCALCIFEROL 2 MICROGRAM(S): 2.5 CAPSULE ORAL at 09:08

## 2021-01-01 RX ADMIN — Medication 2: at 17:47

## 2021-01-01 RX ADMIN — HYDROMORPHONE HYDROCHLORIDE 0.5 MILLIGRAM(S): 2 INJECTION INTRAMUSCULAR; INTRAVENOUS; SUBCUTANEOUS at 23:53

## 2021-01-01 RX ADMIN — HYDROMORPHONE HYDROCHLORIDE 0.5 MILLIGRAM(S): 2 INJECTION INTRAMUSCULAR; INTRAVENOUS; SUBCUTANEOUS at 12:56

## 2021-01-01 RX ADMIN — PIPERACILLIN AND TAZOBACTAM 200 GRAM(S): 4; .5 INJECTION, POWDER, LYOPHILIZED, FOR SOLUTION INTRAVENOUS at 13:21

## 2021-01-01 RX ADMIN — DOXERCALCIFEROL 2 MICROGRAM(S): 2.5 CAPSULE ORAL at 08:47

## 2021-01-01 RX ADMIN — DIVALPROEX SODIUM 250 MILLIGRAM(S): 500 TABLET, DELAYED RELEASE ORAL at 17:33

## 2021-01-01 RX ADMIN — ATORVASTATIN CALCIUM 40 MILLIGRAM(S): 80 TABLET, FILM COATED ORAL at 21:54

## 2021-01-01 RX ADMIN — DIVALPROEX SODIUM 250 MILLIGRAM(S): 500 TABLET, DELAYED RELEASE ORAL at 05:32

## 2021-01-01 RX ADMIN — Medication 4: at 12:31

## 2021-01-01 RX ADMIN — POLYETHYLENE GLYCOL 3350 17 GRAM(S): 17 POWDER, FOR SOLUTION ORAL at 21:10

## 2021-01-01 RX ADMIN — POLYETHYLENE GLYCOL 3350 17 GRAM(S): 17 POWDER, FOR SOLUTION ORAL at 21:15

## 2021-01-01 RX ADMIN — AMIODARONE HYDROCHLORIDE 200 MILLIGRAM(S): 400 TABLET ORAL at 05:13

## 2021-01-01 RX ADMIN — FENTANYL CITRATE 3.3 MICROGRAM(S)/KG/HR: 50 INJECTION INTRAVENOUS at 09:50

## 2021-01-01 RX ADMIN — DOXERCALCIFEROL 2 MICROGRAM(S): 2.5 CAPSULE ORAL at 14:33

## 2021-01-01 RX ADMIN — SODIUM CHLORIDE 2000 MILLILITER(S): 9 INJECTION, SOLUTION INTRAVENOUS at 05:58

## 2021-01-01 RX ADMIN — Medication 1 APPLICATION(S): at 05:07

## 2021-01-01 RX ADMIN — Medication 100 MILLIGRAM(S): at 09:43

## 2021-01-01 RX ADMIN — AMIODARONE HYDROCHLORIDE 200 MILLIGRAM(S): 400 TABLET ORAL at 06:04

## 2021-01-01 RX ADMIN — FENTANYL CITRATE 25 MICROGRAM(S): 50 INJECTION INTRAVENOUS at 08:44

## 2021-01-01 RX ADMIN — SEVELAMER CARBONATE 1600 MILLIGRAM(S): 2400 POWDER, FOR SUSPENSION ORAL at 14:30

## 2021-01-01 RX ADMIN — PIPERACILLIN AND TAZOBACTAM 200 GRAM(S): 4; .5 INJECTION, POWDER, LYOPHILIZED, FOR SOLUTION INTRAVENOUS at 17:34

## 2021-01-01 RX ADMIN — CHLORHEXIDINE GLUCONATE 15 MILLILITER(S): 213 SOLUTION TOPICAL at 17:00

## 2021-01-01 RX ADMIN — DEXMEDETOMIDINE HYDROCHLORIDE IN 0.9% SODIUM CHLORIDE 3.3 MICROGRAM(S)/KG/HR: 4 INJECTION INTRAVENOUS at 09:15

## 2021-01-01 RX ADMIN — LEVETIRACETAM 415 MILLIGRAM(S): 250 TABLET, FILM COATED ORAL at 06:40

## 2021-01-01 RX ADMIN — CHLORHEXIDINE GLUCONATE 15 MILLILITER(S): 213 SOLUTION TOPICAL at 17:35

## 2021-01-01 RX ADMIN — HYDROMORPHONE HYDROCHLORIDE 0.5 MILLIGRAM(S): 2 INJECTION INTRAMUSCULAR; INTRAVENOUS; SUBCUTANEOUS at 06:24

## 2021-01-01 RX ADMIN — Medication 0: at 11:29

## 2021-01-01 RX ADMIN — Medication 1 GRAM(S): at 23:55

## 2021-01-01 RX ADMIN — LEVETIRACETAM 400 MILLIGRAM(S): 250 TABLET, FILM COATED ORAL at 11:28

## 2021-01-01 RX ADMIN — PANTOPRAZOLE SODIUM 40 MILLIGRAM(S): 20 TABLET, DELAYED RELEASE ORAL at 12:03

## 2021-01-01 RX ADMIN — DEXMEDETOMIDINE HYDROCHLORIDE IN 0.9% SODIUM CHLORIDE 3.3 MICROGRAM(S)/KG/HR: 4 INJECTION INTRAVENOUS at 02:20

## 2021-01-01 RX ADMIN — DEXMEDETOMIDINE HYDROCHLORIDE IN 0.9% SODIUM CHLORIDE 3.3 MICROGRAM(S)/KG/HR: 4 INJECTION INTRAVENOUS at 05:36

## 2021-01-01 RX ADMIN — Medication 2: at 21:34

## 2021-01-01 RX ADMIN — ZOLPIDEM TARTRATE 5 MILLIGRAM(S): 10 TABLET ORAL at 20:49

## 2021-01-01 RX ADMIN — Medication 650 MILLIGRAM(S): at 11:04

## 2021-01-01 RX ADMIN — CHLORHEXIDINE GLUCONATE 15 MILLILITER(S): 213 SOLUTION TOPICAL at 05:21

## 2021-01-01 RX ADMIN — HYDROMORPHONE HYDROCHLORIDE 0.5 MILLIGRAM(S): 2 INJECTION INTRAMUSCULAR; INTRAVENOUS; SUBCUTANEOUS at 02:06

## 2021-01-01 RX ADMIN — FENTANYL CITRATE 25 MICROGRAM(S): 50 INJECTION INTRAVENOUS at 14:41

## 2021-01-01 RX ADMIN — ATORVASTATIN CALCIUM 40 MILLIGRAM(S): 80 TABLET, FILM COATED ORAL at 11:52

## 2021-01-01 RX ADMIN — DOXERCALCIFEROL 2 MICROGRAM(S): 2.5 CAPSULE ORAL at 08:39

## 2021-01-01 RX ADMIN — Medication 125 MICROGRAM(S): at 20:06

## 2021-01-01 RX ADMIN — Medication 100 GRAM(S): at 05:12

## 2021-01-01 RX ADMIN — POLYETHYLENE GLYCOL 3350 17 GRAM(S): 17 POWDER, FOR SOLUTION ORAL at 21:54

## 2021-01-01 RX ADMIN — HEPARIN SODIUM 5000 UNIT(S): 5000 INJECTION INTRAVENOUS; SUBCUTANEOUS at 05:28

## 2021-01-01 RX ADMIN — SENNA PLUS 2 TABLET(S): 8.6 TABLET ORAL at 21:15

## 2021-01-01 RX ADMIN — MIDAZOLAM HYDROCHLORIDE 2 MILLIGRAM(S): 1 INJECTION, SOLUTION INTRAMUSCULAR; INTRAVENOUS at 18:00

## 2021-01-01 RX ADMIN — LEVETIRACETAM 415 MILLIGRAM(S): 250 TABLET, FILM COATED ORAL at 05:02

## 2021-01-01 RX ADMIN — LEVETIRACETAM 415 MILLIGRAM(S): 250 TABLET, FILM COATED ORAL at 18:07

## 2021-01-01 RX ADMIN — AMIODARONE HYDROCHLORIDE 200 MILLIGRAM(S): 400 TABLET ORAL at 17:26

## 2021-01-01 RX ADMIN — Medication 81 MILLIGRAM(S): at 12:14

## 2021-01-01 RX ADMIN — MIDODRINE HYDROCHLORIDE 5 MILLIGRAM(S): 2.5 TABLET ORAL at 21:07

## 2021-01-01 RX ADMIN — CHLORHEXIDINE GLUCONATE 15 MILLILITER(S): 213 SOLUTION TOPICAL at 06:15

## 2021-01-01 RX ADMIN — PANTOPRAZOLE SODIUM 40 MILLIGRAM(S): 20 TABLET, DELAYED RELEASE ORAL at 12:37

## 2021-01-01 RX ADMIN — MIDODRINE HYDROCHLORIDE 10 MILLIGRAM(S): 2.5 TABLET ORAL at 21:53

## 2021-01-01 RX ADMIN — MIDODRINE HYDROCHLORIDE 10 MILLIGRAM(S): 2.5 TABLET ORAL at 14:04

## 2021-01-01 RX ADMIN — Medication 81 MILLIGRAM(S): at 11:53

## 2021-01-01 RX ADMIN — LACTULOSE 15 GRAM(S): 10 SOLUTION ORAL at 21:07

## 2021-01-01 RX ADMIN — Medication 81 MILLIGRAM(S): at 12:15

## 2021-01-01 RX ADMIN — DEXMEDETOMIDINE HYDROCHLORIDE IN 0.9% SODIUM CHLORIDE 3.3 MICROGRAM(S)/KG/HR: 4 INJECTION INTRAVENOUS at 06:26

## 2021-01-01 RX ADMIN — LEVETIRACETAM 415 MILLIGRAM(S): 250 TABLET, FILM COATED ORAL at 05:17

## 2021-01-01 RX ADMIN — Medication 1 APPLICATION(S): at 05:50

## 2021-01-01 RX ADMIN — SERTRALINE 50 MILLIGRAM(S): 25 TABLET, FILM COATED ORAL at 15:54

## 2021-01-01 RX ADMIN — PIPERACILLIN AND TAZOBACTAM 200 GRAM(S): 4; .5 INJECTION, POWDER, LYOPHILIZED, FOR SOLUTION INTRAVENOUS at 21:32

## 2021-01-01 RX ADMIN — MEROPENEM 100 MILLIGRAM(S): 1 INJECTION INTRAVENOUS at 10:43

## 2021-01-01 RX ADMIN — AMIODARONE HYDROCHLORIDE 200 MILLIGRAM(S): 400 TABLET ORAL at 17:09

## 2021-01-01 RX ADMIN — SERTRALINE 50 MILLIGRAM(S): 25 TABLET, FILM COATED ORAL at 11:02

## 2021-01-01 RX ADMIN — CHLORHEXIDINE GLUCONATE 15 MILLILITER(S): 213 SOLUTION TOPICAL at 17:38

## 2021-01-01 RX ADMIN — AMIODARONE HYDROCHLORIDE 200 MILLIGRAM(S): 400 TABLET ORAL at 18:25

## 2021-01-01 RX ADMIN — DEXMEDETOMIDINE HYDROCHLORIDE IN 0.9% SODIUM CHLORIDE 3.3 MICROGRAM(S)/KG/HR: 4 INJECTION INTRAVENOUS at 11:29

## 2021-01-01 RX ADMIN — CHLORHEXIDINE GLUCONATE 15 MILLILITER(S): 213 SOLUTION TOPICAL at 05:04

## 2021-01-01 RX ADMIN — Medication 1 GRAM(S): at 21:02

## 2021-01-01 RX ADMIN — PIPERACILLIN AND TAZOBACTAM 200 GRAM(S): 4; .5 INJECTION, POWDER, LYOPHILIZED, FOR SOLUTION INTRAVENOUS at 05:36

## 2021-01-01 RX ADMIN — SEVELAMER CARBONATE 1600 MILLIGRAM(S): 2400 POWDER, FOR SUSPENSION ORAL at 21:09

## 2021-01-01 RX ADMIN — FENTANYL CITRATE 1 PATCH: 50 INJECTION INTRAVENOUS at 06:07

## 2021-01-01 RX ADMIN — HEPARIN SODIUM 5000 UNIT(S): 5000 INJECTION INTRAVENOUS; SUBCUTANEOUS at 14:12

## 2021-01-01 RX ADMIN — MORPHINE SULFATE 2 MILLIGRAM(S): 50 CAPSULE, EXTENDED RELEASE ORAL at 16:47

## 2021-01-01 RX ADMIN — HYDROMORPHONE HYDROCHLORIDE 0.5 MILLIGRAM(S): 2 INJECTION INTRAMUSCULAR; INTRAVENOUS; SUBCUTANEOUS at 14:08

## 2021-01-01 RX ADMIN — LEVETIRACETAM 415 MILLIGRAM(S): 250 TABLET, FILM COATED ORAL at 05:01

## 2021-01-01 RX ADMIN — POLYETHYLENE GLYCOL 3350 17 GRAM(S): 17 POWDER, FOR SOLUTION ORAL at 21:03

## 2021-01-01 RX ADMIN — DOXERCALCIFEROL 2 MICROGRAM(S): 2.5 CAPSULE ORAL at 11:00

## 2021-01-01 RX ADMIN — LEVETIRACETAM 415 MILLIGRAM(S): 250 TABLET, FILM COATED ORAL at 06:06

## 2021-01-01 RX ADMIN — Medication 4: at 08:27

## 2021-01-01 RX ADMIN — ERYTHROPOIETIN 10000 UNIT(S): 10000 INJECTION, SOLUTION INTRAVENOUS; SUBCUTANEOUS at 09:00

## 2021-01-01 RX ADMIN — Medication 5 MILLIGRAM(S): at 02:00

## 2021-01-01 RX ADMIN — Medication 81 MILLIGRAM(S): at 11:34

## 2021-01-01 RX ADMIN — SEVELAMER CARBONATE 1600 MILLIGRAM(S): 2400 POWDER, FOR SUSPENSION ORAL at 15:56

## 2021-01-01 RX ADMIN — SENNA PLUS 2 TABLET(S): 8.6 TABLET ORAL at 21:01

## 2021-01-01 RX ADMIN — HEPARIN SODIUM 5000 UNIT(S): 5000 INJECTION INTRAVENOUS; SUBCUTANEOUS at 05:30

## 2021-01-01 RX ADMIN — HYDROMORPHONE HYDROCHLORIDE 0.5 MILLIGRAM(S): 2 INJECTION INTRAMUSCULAR; INTRAVENOUS; SUBCUTANEOUS at 00:09

## 2021-01-01 RX ADMIN — POLYETHYLENE GLYCOL 3350 17 GRAM(S): 17 POWDER, FOR SOLUTION ORAL at 21:09

## 2021-01-01 RX ADMIN — SEVELAMER CARBONATE 1600 MILLIGRAM(S): 2400 POWDER, FOR SUSPENSION ORAL at 05:59

## 2021-01-01 RX ADMIN — Medication 17.4 MICROGRAM(S)/KG/MIN: at 10:44

## 2021-01-01 RX ADMIN — Medication 1 APPLICATION(S): at 06:16

## 2021-01-01 RX ADMIN — Medication 1 GRAM(S): at 21:07

## 2021-01-01 RX ADMIN — LEVETIRACETAM 415 MILLIGRAM(S): 250 TABLET, FILM COATED ORAL at 18:05

## 2021-01-01 RX ADMIN — ATORVASTATIN CALCIUM 40 MILLIGRAM(S): 80 TABLET, FILM COATED ORAL at 21:34

## 2021-01-01 RX ADMIN — Medication 81 MILLIGRAM(S): at 12:24

## 2021-01-01 RX ADMIN — Medication 250 MILLIGRAM(S): at 05:00

## 2021-01-01 RX ADMIN — Medication 25 MILLIGRAM(S): at 06:26

## 2021-01-01 RX ADMIN — FENTANYL CITRATE 25 MICROGRAM(S): 50 INJECTION INTRAVENOUS at 14:39

## 2021-01-01 RX ADMIN — Medication 1 APPLICATION(S): at 18:16

## 2021-01-01 RX ADMIN — ATORVASTATIN CALCIUM 40 MILLIGRAM(S): 80 TABLET, FILM COATED ORAL at 21:50

## 2021-01-01 RX ADMIN — CHLORHEXIDINE GLUCONATE 15 MILLILITER(S): 213 SOLUTION TOPICAL at 06:06

## 2021-01-01 RX ADMIN — ERYTHROPOIETIN 10000 UNIT(S): 10000 INJECTION, SOLUTION INTRAVENOUS; SUBCUTANEOUS at 11:10

## 2021-01-01 RX ADMIN — DEXMEDETOMIDINE HYDROCHLORIDE IN 0.9% SODIUM CHLORIDE 3.3 MICROGRAM(S)/KG/HR: 4 INJECTION INTRAVENOUS at 09:50

## 2021-01-01 RX ADMIN — Medication 1 APPLICATION(S): at 17:58

## 2021-01-01 RX ADMIN — HEPARIN SODIUM 5000 UNIT(S): 5000 INJECTION INTRAVENOUS; SUBCUTANEOUS at 13:47

## 2021-01-01 RX ADMIN — Medication 1 APPLICATION(S): at 05:16

## 2021-01-01 RX ADMIN — Medication 81 MILLIGRAM(S): at 11:29

## 2021-01-01 RX ADMIN — INSULIN GLARGINE 5 UNIT(S): 100 INJECTION, SOLUTION SUBCUTANEOUS at 20:59

## 2021-01-01 RX ADMIN — AMIODARONE HYDROCHLORIDE 200 MILLIGRAM(S): 400 TABLET ORAL at 23:46

## 2021-01-01 RX ADMIN — CHLORHEXIDINE GLUCONATE 15 MILLILITER(S): 213 SOLUTION TOPICAL at 05:01

## 2021-01-01 RX ADMIN — LEVETIRACETAM 415 MILLIGRAM(S): 250 TABLET, FILM COATED ORAL at 17:02

## 2021-01-01 RX ADMIN — Medication 6: at 08:12

## 2021-01-01 RX ADMIN — PIPERACILLIN AND TAZOBACTAM 200 GRAM(S): 4; .5 INJECTION, POWDER, LYOPHILIZED, FOR SOLUTION INTRAVENOUS at 13:22

## 2021-01-01 RX ADMIN — Medication 250 MILLIGRAM(S): at 17:48

## 2021-01-01 RX ADMIN — CHLORHEXIDINE GLUCONATE 15 MILLILITER(S): 213 SOLUTION TOPICAL at 17:43

## 2021-01-01 RX ADMIN — FENTANYL CITRATE 2.9 MICROGRAM(S)/KG/HR: 50 INJECTION INTRAVENOUS at 02:46

## 2021-01-01 RX ADMIN — SEVELAMER CARBONATE 1600 MILLIGRAM(S): 2400 POWDER, FOR SUSPENSION ORAL at 22:12

## 2021-01-01 RX ADMIN — LACTULOSE 15 GRAM(S): 10 SOLUTION ORAL at 16:01

## 2021-01-01 RX ADMIN — LEVETIRACETAM 415 MILLIGRAM(S): 250 TABLET, FILM COATED ORAL at 17:32

## 2021-01-01 RX ADMIN — MIDODRINE HYDROCHLORIDE 5 MILLIGRAM(S): 2.5 TABLET ORAL at 14:38

## 2021-01-01 RX ADMIN — HEPARIN SODIUM 5000 UNIT(S): 5000 INJECTION INTRAVENOUS; SUBCUTANEOUS at 05:59

## 2021-01-01 RX ADMIN — ATORVASTATIN CALCIUM 40 MILLIGRAM(S): 80 TABLET, FILM COATED ORAL at 21:15

## 2021-01-01 RX ADMIN — LACTULOSE 15 GRAM(S): 10 SOLUTION ORAL at 14:17

## 2021-01-29 PROBLEM — Z87.42 HISTORY OF UTERINE PROLAPSE: Status: RESOLVED | Noted: 2020-01-01 | Resolved: 2021-01-01

## 2021-01-29 PROBLEM — N81.3 COMPLETE UTEROVAGINAL PROLAPSE: Status: ACTIVE | Noted: 2019-09-13

## 2021-02-01 PROBLEM — Z98.890 S/P MAZE OPERATION FOR ATRIAL FIBRILLATION: Status: ACTIVE | Noted: 2019-11-24

## 2021-02-01 PROBLEM — Z95.1 S/P CABG (CORONARY ARTERY BYPASS GRAFT): Status: ACTIVE | Noted: 2019-10-21

## 2021-02-01 PROBLEM — E11.9 DIABETES: Status: ACTIVE | Noted: 2019-09-13

## 2021-02-01 PROBLEM — N18.9 CKD (CHRONIC KIDNEY DISEASE): Status: ACTIVE | Noted: 2020-05-08

## 2021-02-01 PROBLEM — D64.9 ANEMIA: Status: ACTIVE | Noted: 2019-10-26

## 2021-02-01 PROBLEM — I25.10 3-VESSEL CAD: Status: ACTIVE | Noted: 2019-10-26

## 2021-02-01 PROBLEM — I50.22 CHRONIC SYSTOLIC CHF (CONGESTIVE HEART FAILURE), NYHA CLASS 2: Status: ACTIVE | Noted: 2020-02-18

## 2021-02-01 NOTE — HISTORY OF PRESENT ILLNESS
[FreeTextEntry1] : 68 y/o F with h/o DM, CAD s/p CABG+ NUBIA clipping, ICM, chronic systolic HF, CKD on HD coming for follow up. Patient reports feeling better. She is ablet to walk around the house without a walker or cane. She takes care of her ADLs. No significant LE edema. She goes to HD 3 times/week and reports no significant symptoms during HD.

## 2021-02-02 NOTE — ED PROVIDER NOTE - ATTENDING CONTRIBUTION TO CARE
I was present for and supervised the key and critical aspects of the procedures performed during the care of the patient.  Patient is a 71 yo f who presents after her montejo cathter fell out she has indwelling montejo cathter for urinary leakage scheduled for operative repair of pelvic floor, she denies any abdominal pain or back pain she denies any fevers or chills she denies any vomiting or diarrhea  on exam she is nc/at perrla eomi oropharynx clear cta b/l, s1s2 noted abd-soft, ext- edema noted at baseline no focal deficits   A/P- we placed a montejo cathter I will discharge at this time

## 2021-02-02 NOTE — ED PROVIDER NOTE - NSFOLLOWUPINSTRUCTIONS_ED_ALL_ED_FT
see your doctor in the next 24-48 hours   return for any further concerns     Urinary Retention    Urinary retention is the inability to completely empty your bladder. This is a common problem in older men, especially with enlarged prostates. If you are sent home with a montejo catheter and a drainage system make sure to keep the drainage bag emptied and lower than your catheter. Keep the montejo catheter in until you follow up with a urologist.    SEEK IMMEDIATE MEDICAL CARE IF YOU DEVELOP THE FOLLOWING SYMPTOMS: the catheter stops draining urine, the catheter falls out, abdominal pain, nausea/vomiting, or chills/fever.

## 2021-02-02 NOTE — ED PROVIDER NOTE - PATIENT PORTAL LINK FT
You can access the FollowMyHealth Patient Portal offered by [facility] by registering at the following website: [email or web address]. By joining [employer]’s FollowMyHealth portal, you will also be able to view your health information using other applications (apps) compatible with our system.

## 2021-02-02 NOTE — ED PROVIDER NOTE - OBJECTIVE STATEMENT
Patient is a 69 yo f who presents after her montejo cathter fell out she has indwelling montejo cathter for urinary leakage scheduled for operative repair of pelvic floor, she denies any abdominal pain or back pain she denies any fevers or chills she denies any vomiting or diarrhea

## 2021-02-02 NOTE — ED ADULT NURSE NOTE - PSH
History of repair of hip fracture  2019  History of thoracentesis    S/P CABG (coronary artery bypass graft)  11/2019

## 2021-03-02 NOTE — ED PROVIDER NOTE - OBJECTIVE STATEMENT
Patient is a 69 yo female with PMHx of CAD s/p CABG, CHF, bladder prolapse, DM, ESRD on hemodialysis MWF c/o left buttock abscess x 2 days. Patient noticed pain in left buttock 2 days ago, no pus or blood, pain when sitting down. Denies fever, chills, chest pain, SOB, abdominal pain, n/v/d, dysuria. Seen by Dr. Mota today and sent to ED to be admitted.

## 2021-03-02 NOTE — ED PROVIDER NOTE - PHYSICAL EXAMINATION
CONSTITUTIONAL: Well-developed; well-nourished; in no acute distress.   SKIN: warm, dry  HEAD: Normocephalic; atraumatic.  EYES: no conjunctival injection. PERRL.   ENT: No nasal discharge; airway clear.  NECK: Supple; non tender.  CARD: S1, S2 normal; no murmurs, gallops, or rubs. Regular rate and rhythm.   RESP: No wheezes, rales or rhonchi.  ABD: soft ntnd  : 3 x 3 cm indurated left lower buttock erythematous fluctuant mass, + bladder prolapse, on rectal exam shows external hemorrhoids, no blood or pus  EXT: Normal ROM.  No clubbing, cyanosis or edema.   LYMPH: No acute cervical adenopathy.  NEURO: Alert, oriented, grossly unremarkable  PSYCH: Cooperative, appropriate.

## 2021-03-02 NOTE — ED ADULT NURSE NOTE - PMH
Congestive heart failure (CHF)    Diabetes    Female bladder prolapse    Pleural effusion due to congestive heart failure     Congestive heart failure (CHF)    Diabetes    End stage renal disease  on dialysis Mon, Wed, Fr  Female bladder prolapse    Pleural effusion due to congestive heart failure

## 2021-03-02 NOTE — H&P ADULT - NSHPPHYSICALEXAM_GEN_ALL_CORE
lungs: cta  cvs: s1s2  abd: soft NT/ND  buttock: left buttock induration/erythema/fluctuance/tenerness - no drainage; +external hemorhoids - not inflamed/bleeding/tender;   : + montejo; +prolapsed uterus  ext: no LE edema

## 2021-03-02 NOTE — H&P ADULT - NSICDXPASTSURGICALHX_GEN_ALL_CORE_FT
PAST SURGICAL HISTORY:  History of repair of hip fracture 2019    History of thoracentesis     S/P CABG (coronary artery bypass graft) 11/2019     PAST SURGICAL HISTORY:  Chronic indwelling Castellon catheter     History of repair of hip fracture 2019    History of thoracentesis     S/P CABG (coronary artery bypass graft) 11/2019

## 2021-03-02 NOTE — H&P ADULT - ASSESSMENT
70 yr old female with PMHX: Dm, ESRD, CAD, s/p CABG, CHF, HLD, chronic prolapsed uterus, chronic indwelling montejo - now presents with left buttock pain x 2 days    1. Left buttock abscess   - pt for OR tomorrow for I&D   - NPO p [place]   - IV abx: will D/W renal     2. DM    -continue lantus 5 units QHS   - FS q 6 hrs    3. ESRD   - f/u renal for possible dialysis tomorrow (her usual days are M/W/F)   - Pt already spoke with Dr. Peguero    4. CAD, s/p CABG, CHF   - continue current meds   - on dialysis days, she does not take metolazone, hydralazine or torsemide at all; and she does not take the morning or afternoon dose of isosorbide on dialysis days (but she takes the bedtime dose)   - Hold ASA 81 mg for now    5. HLD   - continue atorvastatin    6;    - continue chronic montejo         70 yr old female with PMHX: Dm, ESRD, CAD, s/p CABG, CHF, HLD, chronic prolapsed uterus, chronic indwelling montejo - now presents with left buttock pain x 2 days    1. Left buttock abscess   - pt for OR tomorrow for I&D   - NPO p [place]   - IV abx: pt received zosyn x 1 in ER. Per Dr. Mota, will hold off on any additional abx until evaluated by renal     2. DM    -hold lantus : for OR tomorrow   - FS q 6 hrs; lispro if needed    3. ESRD   - f/u renal for possible dialysis tomorrow (her usual days are M/W/F)   - Pt already spoke with Dr. Peguero    4. CAD, s/p CABG, CHF   - continue current meds   - on dialysis days, she does not take metolazone, hydralazine or torsemide at all; and she does not take the morning or afternoon dose of isosorbide on dialysis days (but she takes the bedtime dose)   - Hold ASA 81 mg for now    5. HLD   - continue atorvastatin    6;    - continue chronic montejo         70 yr old female with PMHX: Dm, ESRD, CAD, s/p CABG, CHF, HLD, chronic prolapsed uterus, chronic indwelling montejo - now presents with left buttock pain x 2 days    1. Left buttock abscess   - pt for OR tomorrow for I&D   - NPO p [place]   - IV abx: pt received zosyn x 1 in ER. Per Dr. Mota, will hold off on any additional abx until evaluated by renal     2. DM    -hold lantus : for OR tomorrow   - FS q 6 hrs; lispro if needed    3. ESRD   - f/u renal for possible dialysis tomorrow (her usual days are M/W/F)   - Pt already spoke with Dr. Peguero    4. CAD, s/p CABG, CHF   - continue current meds   - on dialysis days, she does not take metolazone, hydralazine or torsemide at all; and she does not take the morning or afternoon dose of isosorbide on dialysis days (but she takes the bedtime dose)   - Hold ASA 81 mg for now   - f/u cardio consul;t    5. HLD   - continue atorvastatin    6;    - continue chronic montejo

## 2021-03-02 NOTE — ED PROVIDER NOTE - PROGRESS NOTE DETAILS
MQ: Talked to surgery Dr. Mota, will admit for OR, obtain labs and COVID, start on IV abx, consulted surgery PA, will see patient ATTENDING NOTE: 71 y/o F p/w a L buttock abscess associated with pain and swelling over the course of 3 days. No fevers. Pt saw Dr. Mota who recommended admission and I&D in the hospital. Plan: Obtain CT scan, labs, IV ABX and surgery consult. MQ: COVID neg, will admit to surgery

## 2021-03-02 NOTE — H&P ADULT - ATTENDING COMMENTS
above noted dicscussed case with surgical PA abdomen soft no distension LT perirectal/buttock abscess ct scan noted for I/D

## 2021-03-02 NOTE — ED PROVIDER NOTE - CLINICAL SUMMARY MEDICAL DECISION MAKING FREE TEXT BOX
patient with buttocks abscess in the setting of bladder prolapse. patietn ahd blood work, ct scan and iv abx were started. surgery was consulted for management.

## 2021-03-02 NOTE — H&P ADULT - NSHPLABSRESULTS_GEN_ALL_CORE
Vital Signs Last 24 Hrs  T(C): 36.4 (02 Mar 2021 15:17), Max: 36.4 (02 Mar 2021 15:17)  T(F): 97.5 (02 Mar 2021 15:17), Max: 97.5 (02 Mar 2021 15:17)  HR: 100 (02 Mar 2021 15:17) (100 - 100)  BP: 100/61 (02 Mar 2021 15:17) (100/61 - 100/61)  BP(mean): --  RR: 20 (02 Mar 2021 15:17) (20 - 20)  SpO2: 96% (02 Mar 2021 15:17) (96% - 96%)    Labs:  CAPILLARY BLOOD GLUCOSE                              8.8    10.55 )-----------( 223      ( 02 Mar 2021 15:57 )             28.0       Auto Neutrophil %: 84.2 % (03-02-21 @ 15:57)  Auto Immature Granulocyte %: 0.7 % (03-02-21 @ 15:57)    03-02    138  |  98  |  42<H>  ----------------------------<  90  4.4   |  23  |  4.6<HH>      Calcium, Total Serum: 9.0 mg/dL (03-02-21 @ 15:57)      LFTs:             6.5  | 0.2  | 24       ------------------[112     ( 02 Mar 2021 15:57 )  3.5  | x    | 10          Lipase:x      Amylase:x         Lactate, Blood: 1.2 mmol/L (03-02-21 @ 15:57)      Coags:     13.80  ----< 1.20    ( 02 Mar 2021 15:57 )     27.1        CARDIAC MARKERS ( 02 Mar 2021 15:57 )  x     / 0.27 ng/mL / x     / x     / x          < from: Xray Chest 1 View- PORTABLE-Urgent (03.02.21 @ 15:44) >    Impression:    Pulmonary vascular congestion. Patchy right basilar opacity. Small to moderate left pleural effusion. Radiographic follow-up recommended.    < end of copied text > Vital Signs Last 24 Hrs  T(C): 36.4 (02 Mar 2021 15:17), Max: 36.4 (02 Mar 2021 15:17)  T(F): 97.5 (02 Mar 2021 15:17), Max: 97.5 (02 Mar 2021 15:17)  HR: 100 (02 Mar 2021 15:17) (100 - 100)  BP: 100/61 (02 Mar 2021 15:17) (100/61 - 100/61)  BP(mean): --  RR: 20 (02 Mar 2021 15:17) (20 - 20)  SpO2: 96% (02 Mar 2021 15:17) (96% - 96%)    Labs:  CAPILLARY BLOOD GLUCOSE                              8.8    10.55 )-----------( 223      ( 02 Mar 2021 15:57 )             28.0       Auto Neutrophil %: 84.2 % (03-02-21 @ 15:57)  Auto Immature Granulocyte %: 0.7 % (03-02-21 @ 15:57)    03-02    138  |  98  |  42<H>  ----------------------------<  90  4.4   |  23  |  4.6<HH>      Calcium, Total Serum: 9.0 mg/dL (03-02-21 @ 15:57)      LFTs:             6.5  | 0.2  | 24       ------------------[112     ( 02 Mar 2021 15:57 )  3.5  | x    | 10          Lipase:x      Amylase:x         Lactate, Blood: 1.2 mmol/L (03-02-21 @ 15:57)      Coags:     13.80  ----< 1.20    ( 02 Mar 2021 15:57 )     27.1        CARDIAC MARKERS ( 02 Mar 2021 15:57 )  x     / 0.27 ng/mL / x     / x     / x        Respiratory Viral Panel with COVID-19 by TREVA (03.02.21 @ 15:40)   Rapid RVP Result: NotDete   SARS-CoV-2: NotDetec: This Respiratory Panel uses polymerase chain reaction (PCR) to detect for     RADIOLOGY:  Xray Chest 1 View- PORTABLE-Urgent (03.02.21 @ 15:44) >    Impression:    Pulmonary vascular congestion. Patchy right basilar opacity. Small to moderate left pleural effusion. Radiographic follow-up recommended. Vital Signs Last 24 Hrs  T(C): 36.4 (02 Mar 2021 15:17), Max: 36.4 (02 Mar 2021 15:17)  T(F): 97.5 (02 Mar 2021 15:17), Max: 97.5 (02 Mar 2021 15:17)  HR: 100 (02 Mar 2021 15:17) (100 - 100)  BP: 100/61 (02 Mar 2021 15:17) (100/61 - 100/61)  BP(mean): --  RR: 20 (02 Mar 2021 15:17) (20 - 20)  SpO2: 96% (02 Mar 2021 15:17) (96% - 96%)    Labs:  CAPILLARY BLOOD GLUCOSE                              8.8    10.55 )-----------( 223      ( 02 Mar 2021 15:57 )             28.0       Auto Neutrophil %: 84.2 % (03-02-21 @ 15:57)  Auto Immature Granulocyte %: 0.7 % (03-02-21 @ 15:57)    03-02    138  |  98  |  42<H>  ----------------------------<  90  4.4   |  23  |  4.6<HH>      Calcium, Total Serum: 9.0 mg/dL (03-02-21 @ 15:57)      LFTs:             6.5  | 0.2  | 24       ------------------[112     ( 02 Mar 2021 15:57 )  3.5  | x    | 10          Lipase:x      Amylase:x         Lactate, Blood: 1.2 mmol/L (03-02-21 @ 15:57)      Coags:     13.80  ----< 1.20    ( 02 Mar 2021 15:57 )     27.1        CARDIAC MARKERS ( 02 Mar 2021 15:57 )  x     / 0.27 ng/mL / x     / x     / x        Respiratory Viral Panel with COVID-19 by TREVA (03.02.21 @ 15:40)   Rapid RVP Result: [facility]   SARS-CoV-2: NotDete: This Respiratory Panel uses polymerase chain reaction (PCR) to detect for     PER ER:   · EKG Date/Time: 02-Mar-2021 17:46  · Rate: 85  · Interpretation: normal  · Axis: Left Deviation  · NM: 198  · QRS: 168  · ST/T Wave: twi in lateral leads rbbb unchanged      RADIOLOGY:  Xray Chest 1 View- PORTABLE-Urgent (03.02.21 @ 15:44) >    Impression:    Pulmonary vascular congestion. Patchy right basilar opacity. Small to moderate left pleural effusion. Radiographic follow-up recommended.    < from: CT Pelvis w/ IV Cont (03.02.21 @ 18:03) >    IMPRESSION:    Left perirectal fluid collection measures approximately 8.6 x 6.0 x 5.6 cm    Severe uterine and rectal prolapse.    Mild-to-moderate ascites in the visualized pelvis.    < end of copied text >

## 2021-03-02 NOTE — ED PROVIDER NOTE - ATTENDING CONTRIBUTION TO CARE
ATTENDING NOTE: 69 y/o F p/w a L buttock abscess associated with pain and swelling over the course of 3 days. No fevers. Pt saw Dr. Mota who recommended admission and I&D in the hospital. Plan: Obtain CT scan, labs, IV ABX and surgery consult.

## 2021-03-02 NOTE — H&P ADULT - HISTORY OF PRESENT ILLNESS
70  yr old female with left buttock pain that began 2 days ago; associated with external hemorrhoids (non bleeding). No fevers, chills; drainage from area. +pain with defecation. Pt known to Dr. Mota - saw him in office and sent here to R/O buttock abscess.  SHe is on dialysis on M/W/F - Dr. Pgeuero.  She takes ASA 81 mg daily.  Cardiologist is Dr. Mancia    +colonscopy last year - (Dr. Solis) - +rectal; ulcer

## 2021-03-02 NOTE — ED PROVIDER NOTE - NS ED ROS FT
Review of Systems:  •	CONSTITUTIONAL - No fever, No diaphoresis, No weight change  •	SKIN - No rash, +left buttock abscess  •	HEMATOLOGIC - No abnormal bleeding or bruising  •	EYES - No eye pain, No blurred vision  •	ENT - No change in hearing, No sore throat, No neck pain, No rhinorrhea, No ear pain  •	RESPIRATORY - No shortness of breath, No cough  •	CARDIAC -No chest pain, No palpitations  •	GI - No abdominal pain, No nausea, No vomiting, No diarrhea, No constipation, No bright red blood per rectum or melena. No flank pain  •                 - No dysuria, frequency, hematuria.   •	ENDO - No polydypsia, No polyuria, No heat/cold intolerance  •	MUSCULOSKELETAL - No joint paint, No swelling, No back pain  •	NEUROLOGIC - No numbness, No focal weakness, No headache, No dizziness  All other systems negative, unless specified in HPI

## 2021-03-02 NOTE — H&P ADULT - NSICDXPASTMEDICALHX_GEN_ALL_CORE_FT
PAST MEDICAL HISTORY:  Congestive heart failure (CHF)     Diabetes     End stage renal disease on dialysis Mon, Wed, Fr    Female bladder prolapse     Pleural effusion due to congestive heart failure      PAST MEDICAL HISTORY:  Chronic indwelling Castellon catheter     Congestive heart failure (CHF)     Diabetes     End stage renal disease on dialysis Mon, Wed, Fr    Pleural effusion due to congestive heart failure     Uterine prolapse

## 2021-03-03 NOTE — CONSULT NOTE ADULT - ASSESSMENT
Patient with hx FX hip CABG 9/2019 CHF ef 25 %. She denies chest pain or sob. She can walk about 1 block or go up one flight steps. She has ESRD on HMD. Patients trop elevated since 9/2019. She needs a I and D. Cardiac risk on local standby sedation low to intermediate. Continue meds. F/U Dr Mancia

## 2021-03-03 NOTE — CONSULT NOTE ADULT - SUBJECTIVE AND OBJECTIVE BOX
[facility] INITIAL CONSULT NOTE  --------------------------------------------------------------------------------  HPI:  70  yr old female with left buttock pain that began 2 days ago; associated with external hemorrhoids (non bleeding). No fevers, chills; drainage from area. +pain with defecation. Pt known to Dr. Mota - saw him in office and sent here to R/O buttock abscess. CT scan shows large perirectal abcess. Plan for I&D today. She also has ESRD on hemodialysis on M/W/F at [facility].     PAST HISTORY  --------------------------------------------------------------------------------  PAST MEDICAL & SURGICAL HISTORY:  Chronic indwelling Castellon catheter  Uterine prolapse  End stage renal disease  on dialysis Mon, Wed, Fr  Pleural effusion due to congestive heart failure  Congestive heart failure (CHF)  Diabetes  Chronic indwelling Castellon catheter  History of thoracentesis  S/P CABG (coronary artery bypass graft)  11/2019  History of repair of hip fracture  2019    FAMILY HISTORY:  FH: myocardial infarction (Mother)  FH: stomach cancer (Mother)    SOCIAL HISTORY:  No current ETOH, tobacco, or illicit drug use    ALLERGIES & MEDICATIONS  --------------------------------------------------------------------------------  Allergies    No Known Allergies    Standing Inpatient Medications  aMIOdarone    Tablet 200 milliGRAM(s) Oral daily  aspirin  chewable 81 milliGRAM(s) Oral daily  atorvastatin 40 milliGRAM(s) Oral at bedtime  dextrose 40% Gel 15 Gram(s) Oral once  dextrose 5%. 1000 milliLiter(s) IV Continuous <Continuous>  dextrose 5%. 1000 milliLiter(s) IV Continuous <Continuous>  dextrose 50% Injectable 25 Gram(s) IV Push once  dextrose 50% Injectable 12.5 Gram(s) IV Push once  dextrose 50% Injectable 25 Gram(s) IV Push once  glucagon  Injectable 1 milliGRAM(s) IntraMuscular once  insulin lispro (ADMELOG) corrective regimen sliding scale   SubCutaneous three times a day before meals  metoprolol succinate ER 25 milliGRAM(s) Oral every 12 hours  pantoprazole    Tablet 40 milliGRAM(s) Oral before breakfast  sertraline 50 milliGRAM(s) Oral daily    PRN Inpatient Medications  temazepam 15 milliGRAM(s) Oral at bedtime PRN    REVIEW OF SYSTEMS  --------------------------------------------------------------------------------  Gen: No fevers/chills  Head/Eyes/Ears/Mouth: No headache; No sinus pain/discomfort, sore throat  Respiratory: No dyspnea, cough, wheezing, hemoptysis  CV: No chest pain, PND, orthopnea  GI: No abdominal pain, diarrhea, constipation, nausea, vomiting, melena, hematochezia  : No increased frequency, dysuria, hematuria, nocturia  All other systems were reviewed and are negative, except as noted.    VITALS/PHYSICAL EXAM  --------------------------------------------------------------------------------  T(C): 36.6 (03-03-21 @ 05:21), Max: 36.9 (03-02-21 @ 22:33)  HR: 74 (03-03-21 @ 06:27) (74 - 100)  BP: 118/64 (03-03-21 @ 06:27) (97/69 - 119/66)  RR: 18 (03-03-21 @ 05:21) (18 - 20)  SpO2: 96% (03-02-21 @ 15:17) (96% - 96%)  Height (cm): 160 (03-02-21 @ 19:36)  Weight (kg): 58.3 (03-02-21 @ 19:36)  BMI (kg/m2): 22.8 (03-02-21 @ 19:36)  BSA (m2): 1.6 (03-02-21 @ 19:36)    Physical Exam:  	Gen: NAD  	Pulm: CTA B/L  	CV: RRR, S1S2  	Back: No CVA tenderness; no sacral edema  	Abd: +BS, soft, nontender/nondistended  	: No suprapubic tenderness  	LE: Warm, trace edema  	Neuro: AAO x3  	Vascular access: RIJ TDC, LUE AVF    LABS/STUDIES  --------------------------------------------------------------------------------              8.4    8.92  >-----------<  212      [03-03-21 @ 06:45]              27.5     138  |  98  |  42  ----------------------------<  90      [03-02-21 @ 15:57]  4.4   |  23  |  4.6        Ca     9.0     [03-02-21 @ 15:57]      Mg     2.4     [03-02-21 @ 15:57]    TPro  6.5  /  Alb  3.5  /  TBili  0.2  /  DBili  x   /  AST  24  /  ALT  10  /  AlkPhos  112  [03-02-21 @ 15:57]    PT/INR: PT 13.80, INR 1.20       [03-02-21 @ 15:57]  PTT: 27.1       [03-02-21 @ 15:57]    Troponin 0.27      [03-02-21 @ 15:57]    Creatinine Trend:  SCr 4.6 [03-02 @ 15:57]    Urinalysis - [05-11-20 @ 22:30]      Color Yellow / Appearance Turbid / SG 1.005 / pH 7.0      Gluc Negative / Ketone Negative  / Bili Negative / Urobili <2 mg/dL       Blood Moderate / Protein 100 mg/dL / Leuk Est Large / Nitrite Negative      RBC 3 / WBC >720 / Hyaline 8 / Gran  / Sq Epi  / Non Sq Epi 3 / Bacteria Moderate    Iron 55, TIBC 167, %sat 33      [05-27-20 @ 13:22]  Ferritin 636      [05-27-20 @ 13:22]  HbA1c 5.7      [11-09-19 @ 01:20]    HBsAb Nonreact      [05-19-20 @ 14:58]  HBsAg Nonreact      [05-19-20 @ 14:58]  HCV 0.33, Nonreact      [05-19-20 @ 14:58]    Free Light Chains: kappa 6.76, lambda 3.58, ratio = 1.89      [05-19 @ 04:50]  Immunofixation Serum:   No Monoclonal Band Identified    Reference Range: None Detected      [05-19-20 @ 04:50]  SPEP Interpretation: Hypogammaglobulinemia      [05-19-20 @ 04:50]

## 2021-03-03 NOTE — CHART NOTE - NSCHARTNOTEFT_GEN_A_CORE
PACU ANESTHESIA ADMISSION NOTE      Procedure: I and D Left Buttock Abscess  Post op diagnosis:  Left Buttock Abscess    ____  Intubated  TV:______       Rate: ______      FiO2: ______    __x__  Patent Airway    __x__  Full return of protective reflexes    __x__  Full recovery from anesthesia / back to baseline     Vitals:   T: 37          R:     16             BP:      125/78            Sat:     100              P: 78      Mental Status:  __x__ Awake   _____ Alert   _____ Drowsy   _____ Sedated    Nausea/Vomiting:  __x__ NO  ______Yes,   See Post - Op Orders          Pain Scale (0-10):  __0___    Treatment: ____ None    ___x_ See Post - Op/PCA Orders    Post - Operative Fluids:   ____ Oral   ___x_ See Post - Op Orders    Plan: Discharge:   _x___Home       _____Floor     _____Critical Care    _____  Other:_________________    Comments:

## 2021-03-03 NOTE — CONSULT NOTE ADULT - ASSESSMENT
IMPRESSION:  perirectal abscess s/p drainage  labile BP  early sepsis  ESRD on RRT  chronic heart failure  ascites  diabetes    SUGGEST:  monitor overnight in ICU  full cultures  empiric abx. Zosyn renal doses 2.25 gm Q12 extra 0.75 gm after each HD session start today  renal f/u for RRT likely tomorrow  routine labs/ CXR in AM  pressors if needed  ID consult  renal card f/u  DVT gastritis prophylaxis  diet may need insulin protocol

## 2021-03-03 NOTE — CONSULT NOTE ADULT - SUBJECTIVE AND OBJECTIVE BOX
CARDIOLOGY CONSULT NOTE     CHIEF COMPLAINT/REASON FOR CONSULT:    HPI:  70  yr old female with left buttock pain that began 2 days ago; associated with external hemorrhoids (non bleeding). No fevers, chills; drainage from area. +pain with defecation. Pt known to Dr. Mota - saw him in office and sent here to R/O buttock abscess.  SHe is on dialysis on M/W/F - Dr. Peguero.  She takes ASA 81 mg daily.  Cardiologist is Dr. Mancia    +colonscopy last year - (Dr. Solis) - +rectal; ulcer (02 Mar 2021 17:02)      PAST MEDICAL & SURGICAL HISTORY:  Chronic indwelling Castellon catheter    Uterine prolapse    End stage renal disease  on dialysis Mon, Wed, Fr    Pleural effusion due to congestive heart failure    Congestive heart failure (CHF)    Diabetes    Chronic indwelling Castellon catheter    History of thoracentesis    S/P CABG (coronary artery bypass graft)  11/2019    History of repair of hip fracture  2019        Cardiac Risks:   [ ]HTN, [x ] DM, [ ] Smoking, [ ] FH,  [x ] Lipids        MEDICATIONS:  MEDICATIONS  (STANDING):  aMIOdarone    Tablet 200 milliGRAM(s) Oral daily  aspirin  chewable 81 milliGRAM(s) Oral daily  atorvastatin 40 milliGRAM(s) Oral at bedtime  dextrose 40% Gel 15 Gram(s) Oral once  dextrose 5%. 1000 milliLiter(s) (50 mL/Hr) IV Continuous <Continuous>  dextrose 5%. 1000 milliLiter(s) (100 mL/Hr) IV Continuous <Continuous>  dextrose 50% Injectable 25 Gram(s) IV Push once  dextrose 50% Injectable 12.5 Gram(s) IV Push once  dextrose 50% Injectable 25 Gram(s) IV Push once  doxercalciferol Injectable 2 MICROGram(s) IV Push <User Schedule>  epoetin mana-epbx (RETACRIT) Injectable 73762 Unit(s) IV Push <User Schedule>  glucagon  Injectable 1 milliGRAM(s) IntraMuscular once  insulin lispro (ADMELOG) corrective regimen sliding scale   SubCutaneous three times a day before meals  metoprolol succinate ER 25 milliGRAM(s) Oral every 12 hours  pantoprazole    Tablet 40 milliGRAM(s) Oral before breakfast  sertraline 50 milliGRAM(s) Oral daily  sodium chloride 0.9%. 1000 milliLiter(s) (30 mL/Hr) IV Continuous <Continuous>      FAMILY HISTORY:  FH: myocardial infarction (Mother)    FH: stomach cancer (Mother)        SOCIAL HISTORY:      [ ] Marital status   Allergies    No Known Allergies        	    REVIEW OF SYSTEMS:  CONSTITUTIONAL: No fever, weight loss, or fatigue  EYES: No eye pain, visual disturbances, or discharge  ENMT:  No difficulty hearing, tinnitus, vertigo; No sinus or throat pain  NECK: No pain or stiffness  RESPIRATORY: No cough, wheezing, chills or hemoptysis; No Shortness of Breath  CARDIOVASCULAR: No chest pain, palpitations, passing out, dizziness, or leg swelling  GASTROINTESTINAL: No abdominal or epigastric pain. No nausea, vomiting, or hematemesis; No diarrhea or constipation. No melena or hematochezia.  GENITOURINARY: No dysuria, frequency, hematuria, or incontinence  NEUROLOGICAL: No headaches, memory loss, loss of strength, numbness, or tremors  SKIN: No itching, burning, rashes, or lesions   	        PHYSICAL EXAM:  T(C): 36.6 (03-03-21 @ 05:21), Max: 36.9 (03-02-21 @ 22:33)  HR: 74 (03-03-21 @ 06:27) (74 - 100)  BP: 118/64 (03-03-21 @ 06:27) (97/69 - 119/66)  RR: 18 (03-03-21 @ 05:21) (18 - 20)  SpO2: 96% (03-02-21 @ 15:17) (96% - 96%)  Wt(kg): --  I&O's Summary      Appearance: Normal	  Psychiatry: A & O x 3, Mood & affect appropriate  HEENT:   Normal oral mucosa, PERRL, EOMI	  Lymphatic: No lymphadenopathy  Cardiovascular: Normal S1 S2,RRR, No JVD, No murmurs  Respiratory: Lungs clear to auscultation	  Gastrointestinal:  Soft, Non-tender, + BS	  Skin: No rashes, No ecchymoses, No cyanosis	  Neurologic: Non-focal  Extremities: Normal range of motion, No clubbing, cyanosis or edema  Vascular: Peripheral pulses palpable 2+ bilaterally      ECG:  	  < from: 12 Lead ECG (09.17.20 @ 08:42) >  Diagnosis Line Sinus rhythm withPremature supraventricular complexes  Left axis deviation  Left ventricular hypertrophy with QRS widening  Abnormal ECG    Confirmed by Jovany Dan ([id number]) on 9/17/2020 5:32:12 PM    < end of copied text >    	  LABS:	 	    CARDIAC MARKERS:                                    8.4    8.92  )-----------( 212      ( 03 Mar 2021 06:45 )             27.5     03-02    138  |  98  |  42<H>  ----------------------------<  90  4.4   |  23  |  4.6<HH>    Ca    9.0      02 Mar 2021 15:57  Mg     2.4     03-02    TPro  6.5  /  Alb  3.5  /  TBili  0.2  /  DBili  x   /  AST  24  /  ALT  10  /  AlkPhos  112  03-02    PT/INR - ( 02 Mar 2021 15:57 )   PT: 13.80 sec;   INR: 1.20 ratio         PTT - ( 02 Mar 2021 15:57 )  PTT:27.1 sec

## 2021-03-03 NOTE — CONSULT NOTE ADULT - ASSESSMENT
1. L perirectal abscess. Plan for I&D today in OR by Dr Mota. OK to proceed to OR from renal standpoint.  2. ESRD on HD MWF. Will plan for HD tomorrow am.  3. HTN. Continue metoprolol. Resume Lasix and metolazone tomorrow.  4. Anemia. Start EPO with HD.  5. Secondary hyperparathyroidism. Hectorol with HD.  6. Hyperphosphatemia. Start sevelamer 1600mg PO TID with meals.

## 2021-03-03 NOTE — CONSULT NOTE ADULT - SUBJECTIVE AND OBJECTIVE BOX
HPI:  70  yr old female with left buttock pain that began 2 days ago; associated with external hemorrhoids (non bleeding). No fevers, chills; drainage from area.   +pain with defecation.   Pt known to Dr. Mota - saw him in office and sent here to R/O buttock abscess.    She is on dialysis on M/W/F - Dr. Peguero.  She takes ASA 81 mg daily.    Cardiologist is Dr. Mancia    +colonscopy last year - (Dr. Solis) - +rectal ulcer (02 Mar 2021 17:02).    She was taken to the OR today for a drainage procedure of the L teddy rectal abscess.    BP was labile since admission to the hospital. Pt is a very high risk for decompensation overnight.      I discussed the case in detail case with the surgical resident. I reviewed the radiology tests and hospital record prior to visiting the patient.    PMHX:    ABSCESS OF BUTTOCK,LEFT    ^ABSCESS ON BUTT        Chronic indwelling Castellon catheter    Uterine prolapse    End stage renal disease on RRT    Pleural effusion due to congestive heart failure    Congestive heart failure (CHF)    Female bladder prolapse    Heart failure    Female bladder prolapse    Diabetes    No pertinent past medical history      Perirectal abscess    Abscess of buttock, left    Incision and drainage of wound of buttock    Chronic indwelling Castellon catheter    History of thoracentesis    S/P CABG (coronary artery bypass graft)    History of repair of hip fracture    ABSCESS ON BUTT      FAMILY HISTORY:  FH: myocardial infarction (Mother)    FH: stomach cancer (Mother)        No Known Allergies        Marital Status:  (   )    (   ) Single    (   )    (  )   Occupation:   Lives with: (  ) alone  (  ) children   (  ) spouse   (  ) parents  (  ) other  Recent Travel:     Substance Use (street drugs): (x  ) never used  (  ) other:  Tobacco Usage:  (   ) never smoked   (x   ) former smoker   (   ) current smoker  (     ) pack year  Alcohol Usage:        Home prescriptions  amiodarone 200 mg oral tablet: 1 tab(s) orally once a day  aspirin 81 mg oral tablet, chewable: 1 tab(s) orally once a day  atorvastatin 40 mg oral tablet: 1 tab(s) orally once a day (at bedtime)  hydrALAZINE 25 mg oral tablet: 1 tab(s) orally 3 times a day, hold if SBP &lt;100  INSULIN LISPRO KWIKPEN  100 UNIT/ML SOPN:   isosorbide dinitrate 10 mg oral tablet: 1 tab(s) orally 3 times a day hold if SBP&lt;100  Lantus Solostar Pen 100 units/mL subcutaneous solution: 5 unit(s) subcutaneous once a day (at bedtime); hold if glucose &lt;100   METOLAZONE  5 MG TABS: 1  orally once a day  METOPROLOL SUCCINATE ER  25 MG TB24: 1  orally 2 times a day  SERTRALINE HCL  50 MG TABS:   SEVELAMER CARBONATE 800MG TABLETS: TK 2 TS PO TID WITH MEALS  TORSEMIDE 20MG TABLETS: TK 3 TS PO BID  VITAMIN D2 [id number] (ERGO) CAP RX: TK 1 C PO WEEKLY      MEDICATIONS  (STANDING):  dextrose 40% Gel 15 Gram(s) Oral once  dextrose 50% Injectable 25 Gram(s) IV Push once  dextrose 50% Injectable 12.5 Gram(s) IV Push once  dextrose 50% Injectable 25 Gram(s) IV Push once  insulin lispro (ADMELOG) corrective regimen sliding scale   SubCutaneous three times a day before meals    MEDICATIONS  (PRN):      sodium chloride 0.9%.: Solution, 1000 milliLiter(s) infuse at 30 mL/Hr  sodium chloride 0.9% Bolus:   1000 milliLiter(s), IV Bolus, once, infuse over 30 Minute(s), Stop After 1 Doses      T(C): 36.7 (03-03-21 @ 14:39), Max: 36.9 (03-02-21 @ 22:33)  HR: 72 (03-03-21 @ 15:04) (70 - 87)  BP: 126/58 (03-03-21 @ 15:04) (94/57 - 126/58)  RR: 16 (03-03-21 @ 15:04) (16 - 18)  SpO2: 100% (03-03-21 @ 15:04) (98% - 100%)  ICU Vital Signs Last 24 Hrs  T(C): 36.7 (03 Mar 2021 14:39), Max: 36.9 (02 Mar 2021 22:33)  T(F): 98 (03 Mar 2021 14:39), Max: 98.4 (02 Mar 2021 22:33)  HR: 72 (03 Mar 2021 15:04) (70 - 87)  BP: 126/58 (03 Mar 2021 15:04) (94/57 - 126/58)    RR: 16 (03 Mar 2021 15:04) (16 - 18)  SpO2: 100% (03 Mar 2021 15:04) (98% - 100%)      I&O's Summary      I&O's Detail      Drug Dosing Weight  Height (cm): 160 (03 Mar 2021 13:42)  Weight (kg): 58.3 (03 Mar 2021 13:42)  BMI (kg/m2): 22.8 (03 Mar 2021 13:42)  BSA (m2): 1.6 (03 Mar 2021 13:42)    LABS:                          8.4    8.92  )-----------( 212      ( 03 Mar 2021 06:45 )             27.5       03-03    139  |  98  |  51<H>  ----------------------------<  92  4.1   |  24  |  5.4<HH>    Ca    9.0      03 Mar 2021 06:45  Mg     2.4     03-02    TPro  6.5  /  Alb  3.5  /  TBili  0.2  /  DBili  x   /  AST  24  /  ALT  10  /  AlkPhos  112  03-02      LIVER FUNCTIONS - ( 02 Mar 2021 15:57 )  Alb: 3.5 g/dL / Pro: 6.5 g/dL / ALK PHOS: 112 U/L / ALT: 10 U/L / AST: 24 U/L / GGT: x             CARDIAC MARKERS ( 02 Mar 2021 15:57 )  x     / 0.27 ng/mL / x     / x     / x            Lactate, Blood: 1.2 mmol/L (03-02-21 @ 15:57)        RADIOLOGY:  I have personally reviewed all chest and other pertinent radiology films.      REVIEW OF SYSTEMS:     · CONSTITUTIONAL:   no fever   no chills.      · EYES:   no discharge,   no irritation,    no visual changes.    · ENMT:   Ears: no ear pain and no hearing problems.  Nose: no nasal congestion and no nasal drainage.      · CARDIOVASCULAR:   no chest pain,   no swelling  no palpitations      · RESPIRATORY:  no SOB,  no wheezing ,  no respiratory difficulty  no sputum production    · GASTROINTESTINAL:   no abdominal pain,    no nausea   no vomiting.  +rectal pain      · MUSCULOSKELETAL:     +back pain,   no neck pain,   no weakness.    · SKIN:   no pruritis,   no rashes.    · NEURO:   no loss of consciousness,   no headache,   no weakness.    · PSYCHIATRIC:   no known mental health issues  no anxiety  no depression        ALLERGIC/IMMUNOLOGIC:   No active allergic or immunologic issues    all other systems are negative        PHYSICAL EXAM:     · CONSTITUTIONAL:   chronically ill appearing,   well nourished,   NAD    · ENMT:   Airway patent,   Nasal mucosa clear.  Mouth with normal mucosa.   No thrush    · EYES:   Clear bilaterally,   pupils equal,   round and reactive to light.    · CARDIAC:   Normal rate,   regular rhythm.    Heart sounds S1, S2.   no thrills or bruits on palpitation  normal  cardiac impulse  No murmurs, no rubs or gallops on auscultation  no edema        CAROTID:   normal systolic impulse  no bruits    · RESPIRATORY:   rales   normal chest expansion  not tachypneic,  no retractions or use of accessory muscles  palpation of chest is normal with no fremitus  percussion of chest demonstrates no hyperresonance or dullness    · GASTROINTESTINAL:  Abdomen soft,   non-tender,   no guarding,   + BS  liver spleen not palpable    · MUSCULOSKELETAL:   range of motion is not limited,      · NEUROLOGICAL:   Alert and oriented   no obvious focal deficits in cranial nerve areas        · SKIN:   Skin normal color for race,   cool,   dry and intact.

## 2021-03-04 PROBLEM — N81.4 UTEROVAGINAL PROLAPSE, UNSPECIFIED: Chronic | Status: ACTIVE | Noted: 2021-01-01

## 2021-03-04 PROBLEM — N18.6 END STAGE RENAL DISEASE: Chronic | Status: ACTIVE | Noted: 2021-01-01

## 2021-03-04 PROBLEM — Z97.8 PRESENCE OF OTHER SPECIFIED DEVICES: Chronic | Status: ACTIVE | Noted: 2021-01-01

## 2021-03-04 NOTE — CONSULT NOTE ADULT - SUBJECTIVE AND OBJECTIVE BOX
Patient is a 70y old  Female who presents with a chief complaint of I&D (04 Mar 2021 13:14)        REVIEW OF SYSTEMS: Total of twelve systems have been reviewed with patient and found to be negative unless mentioned in HPI    PAST MEDICAL & SURGICAL HISTORY:  Chronic indwelling Castellon catheter  Uterine prolapse  End stage renal disease  on dialysis Mon, Wed, Fr  Pleural effusion due to congestive heart failure    Congestive heart failure (CHF)  Diabetes  Chronic indwelling Castellon catheter  History of thoracentesis\  S/P CABG (coronary artery bypass graft)  11/2019  History of repair of hip fracture  2019            SOCIAL HISTORY  Alcohol: Does not drink  Tobacco: Does not smoke  Illicit substance use: None      FAMILY HISTORY: Non contributory to the present illness        ALLERGIES: No Known Allergies        ICU Vital Signs Last 24 Hrs  T(C): 35.9 (04 Mar 2021 12:00), Max: 36.9 (03 Mar 2021 15:34)  T(F): 96.6 (04 Mar 2021 12:00), Max: 98.5 (03 Mar 2021 15:34)  HR: 77 (04 Mar 2021 14:04) (59 - 77)  BP: 113/59 (04 Mar 2021 13:04) (87/50 - 139/70)  BP(mean): 81 (04 Mar 2021 13:04) (64 - 98)  ABP: --  ABP(mean): --  RR: 39 (04 Mar 2021 14:04) (16 - 39)  SpO2: 89% (04 Mar 2021 14:04) (89% - 100%)          PHYSICAL EXAM:  GENERAL: Not in distress   CHEST/LUNG:  Aire ntry bilaterally  HEART: s1 and s2 present  ABDOMEN:  Nontender and  Nondistended  EXTREMITIES: No pedal  edema  CNS: Awake and Alert      LABS:                        7.3    7.82  )-----------( 188      ( 04 Mar 2021 05:17 )             24.5       03-04    137  |  99  |  62<HH>  ----------------------------<  112<H>  4.6   |  24  |  6.3<HH>    Ca    8.6      04 Mar 2021 05:17  Mg     2.5     03-04    TPro  5.4<L>  /  Alb  3.3<L>  /  TBili  0.2  /  DBili  x   /  AST  10  /  ALT  7   /  AlkPhos  92  03-04    PT/INR - ( 02 Mar 2021 15:57 )   PT: 13.80 sec;   INR: 1.20 ratio         PTT - ( 02 Mar 2021 15:57 )  PTT:27.1 sec    CAPILLARY BLOOD GLUCOSE  POCT Blood Glucose.: 108 mg/dL (04 Mar 2021 12:43)  POCT Blood Glucose.: 122 mg/dL (04 Mar 2021 07:47)  POCT Blood Glucose.: 150 mg/dL (03 Mar 2021 22:00)  POCT Blood Glucose.: 99 mg/dL (03 Mar 2021 15:39)            MEDICATIONS  (STANDING):  aMIOdarone    Tablet 200 milliGRAM(s) Oral daily  aspirin  chewable 81 milliGRAM(s) Oral daily  atorvastatin 20 milliGRAM(s) Oral at bedtime  chlorhexidine 4% Liquid 1 Application(s) Topical <User Schedule>  dextrose 40% Gel 15 Gram(s) Oral once  dextrose 50% Injectable 25 Gram(s) IV Push once  dextrose 50% Injectable 12.5 Gram(s) IV Push once  dextrose 50% Injectable 25 Gram(s) IV Push once  ergocalciferol 95412 Unit(s) Oral <User Schedule>  heparin   Injectable 5000 Unit(s) SubCutaneous every 8 hours  insulin glargine SubCutaneous Injection (LANTUS) - Peds 5 Unit(s) SubCutaneous at bedtime  insulin lispro (ADMELOG) corrective regimen sliding scale   SubCutaneous three times a day before meals  melatonin 5 milliGRAM(s) Oral at bedtime  metolazone 5 milliGRAM(s) Oral daily  metoprolol succinate ER 25 milliGRAM(s) Oral two times a day  piperacillin/tazobactam IVPB.. 2.25 Gram(s) IV Intermittent every 8 hours  sertraline 50 milliGRAM(s) Oral daily  sevelamer carbonate 1600 milliGRAM(s) Oral three times a day    MEDICATIONS  (PRN):  acetaminophen   Tablet .. 650 milliGRAM(s) Oral every 6 hours PRN Temp greater or equal to 38C (100.4F), Mild Pain (1 - 3)  oxyCODONE    IR 5 milliGRAM(s) Oral every 6 hours PRN Moderate Pain (4 - 6)          RADIOLOGY & ADDITIONAL TESTS:             Patient is a 70y old  Female with left buttock pain that began 2 days ago; associated with external hemorrhoids (non bleeding). No fevers, chills; drainage from area. +pain with defecation. Pt known to Dr. Mota - saw him in office and sent here to R/O buttock abscess.  She underwent drainage of the abscess and admitted to ICU for close monitoring. She has started on zosyn, and the ID consult requested to assist with further evaluation and antibiotic management.       REVIEW OF SYSTEMS: Total of twelve systems have been reviewed with patient and found to be negative unless mentioned in HPI      PAST MEDICAL & SURGICAL HISTORY:  Chronic indwelling Castellon catheter  Uterine prolapse  End stage renal disease, on dialysis Mon, Wed, Fr  Pleural effusion due to congestive heart failure  Congestive heart failure (CHF)  Diabetes  Chronic indwelling Castellon catheter  History of thoracentesis  S/P CABG (coronary artery bypass graft), 11/2019  History of repair of hip fracture, 2019        SOCIAL HISTORY  Alcohol: Does not drink  Tobacco: Does not smoke  Illicit substance use: None      FAMILY HISTORY: Non contributory to the present illness        ALLERGIES: No Known Allergies        ICU Vital Signs Last 24 Hrs  T(C): 35.9 (04 Mar 2021 12:00), Max: 36.9 (03 Mar 2021 15:34)  T(F): 96.6 (04 Mar 2021 12:00), Max: 98.5 (03 Mar 2021 15:34)  HR: 77 (04 Mar 2021 14:04) (59 - 77)  BP: 113/59 (04 Mar 2021 13:04) (87/50 - 139/70)  BP(mean): 81 (04 Mar 2021 13:04) (64 - 98)  ABP: --  ABP(mean): --  RR: 39 (04 Mar 2021 14:04) (16 - 39)  SpO2: 89% (04 Mar 2021 14:04) (89% - 100%)          PHYSICAL EXAM:  GENERAL: Not in distress   CHEST/LUNG: Not using accessory muscles  HEART: s1 and s2 present  ABDOMEN:  Nontender and  Nondistended  EXTREMITIES: No pedal  edema  CNS: Awake and Alert      LABS:                        7.3    7.82  )-----------( 188      ( 04 Mar 2021 05:17 )             24.5       03-04    137  |  99  |  62<HH>  ----------------------------<  112<H>  4.6   |  24  |  6.3<HH>    Ca    8.6      04 Mar 2021 05:17  Mg     2.5     03-04    TPro  5.4<L>  /  Alb  3.3<L>  /  TBili  0.2  /  DBili  x   /  AST  10  /  ALT  7   /  AlkPhos  92  03-04    PT/INR - ( 02 Mar 2021 15:57 )   PT: 13.80 sec;   INR: 1.20 ratio         PTT - ( 02 Mar 2021 15:57 )  PTT:27.1 sec    CAPILLARY BLOOD GLUCOSE  POCT Blood Glucose.: 108 mg/dL (04 Mar 2021 12:43)  POCT Blood Glucose.: 122 mg/dL (04 Mar 2021 07:47)  POCT Blood Glucose.: 150 mg/dL (03 Mar 2021 22:00)  POCT Blood Glucose.: 99 mg/dL (03 Mar 2021 15:39)          MEDICATIONS  (STANDING):  aMIOdarone    Tablet 200 milliGRAM(s) Oral daily  aspirin  chewable 81 milliGRAM(s) Oral daily  atorvastatin 20 milliGRAM(s) Oral at bedtime  chlorhexidine 4% Liquid 1 Application(s) Topical <User Schedule>  ergocalciferol 54989 Unit(s) Oral <User Schedule>  heparin   Injectable 5000 Unit(s) SubCutaneous every 8 hours  insulin glargine SubCutaneous Injection (LANTUS) - Peds 5 Unit(s) SubCutaneous at bedtime  insulin lispro (ADMELOG) corrective regimen sliding scale   SubCutaneous three times a day before meals  melatonin 5 milliGRAM(s) Oral at bedtime  metolazone 5 milliGRAM(s) Oral daily  metoprolol succinate ER 25 milliGRAM(s) Oral two times a day  piperacillin/tazobactam IVPB.. 2.25 Gram(s) IV Intermittent every 8 hours  sertraline 50 milliGRAM(s) Oral daily  sevelamer carbonate 1600 milliGRAM(s) Oral three times a day    MEDICATIONS  (PRN):  acetaminophen   Tablet .. 650 milliGRAM(s) Oral every 6 hours PRN Temp greater or equal to 38C (100.4F), Mild Pain (1 - 3)  oxyCODONE    IR 5 milliGRAM(s) Oral every 6 hours PRN Moderate Pain (4 - 6)        RADIOLOGY & ADDITIONAL TESTS:      < from: Xray Chest 1 View- PORTABLE-Routine (Xray Chest 1 View- PORTABLE-Routine in AM.) (03.04.21 @ 05:41) >  Increased bibasilar opacities and effusions, left greater than right. No pneumothorax.    < end of copied text >  < from: CT Pelvis w/ IV Cont (03.02.21 @ 18:03) >  Left perirectal fluid collection measures approximately 8.6 x 6.0 x 5.6 cm    Severe uterine and rectal prolapse.    Mild-to-moderate ascites in the visualized pelvis.    < end of copied text >         Patient is a 70y old  Female with left buttock pain that began 2 days ago; associated with external hemorrhoids (non bleeding). No fevers, chills; drainage from area. +pain with defecation. Pt known to Dr. Mota - saw him in office and sent here to R/O buttock abscess.  She underwent drainage of the abscess and admitted to ICU for close monitoring. She has started on zosyn, and the ID consult requested to assist with further evaluation and antibiotic management.       REVIEW OF SYSTEMS: Total of twelve systems have been reviewed with patient and found to be negative unless mentioned in HPI      PAST MEDICAL & SURGICAL HISTORY:  Chronic indwelling Castellon catheter  Uterine prolapse  End stage renal disease, on dialysis Mon, Wed, Fr  Pleural effusion due to congestive heart failure  Congestive heart failure (CHF)  Diabetes  Chronic indwelling Castellon catheter  History of thoracentesis  S/P CABG (coronary artery bypass graft), 11/2019  History of repair of hip fracture, 2019        SOCIAL HISTORY  Alcohol: Does not drink  Tobacco: Does not smoke  Illicit substance use: None      FAMILY HISTORY: Non contributory to the present illness        ALLERGIES: No Known Allergies      ICU Vital Signs Last 24 Hrs  T(C): 35.9 (04 Mar 2021 12:00), Max: 36.9 (03 Mar 2021 15:34)  T(F): 96.6 (04 Mar 2021 12:00), Max: 98.5 (03 Mar 2021 15:34)  HR: 77 (04 Mar 2021 14:04) (59 - 77)  BP: 113/59 (04 Mar 2021 13:04) (87/50 - 139/70)  BP(mean): 81 (04 Mar 2021 13:04) (64 - 98)  ABP: --  ABP(mean): --  RR: 39 (04 Mar 2021 14:04) (16 - 39)  SpO2: 89% (04 Mar 2021 14:04) (89% - 100%)      PHYSICAL EXAM:  GENERAL: Not in distress   CHEST/LUNG: Not using accessory muscles  HEART: s1 and s2 present  ABDOMEN:  Nontender and  Nondistended  RECTUM : Left perirectum bandage in placed  EXTREMITIES: No pedal  edema  CNS: Awake and Alert      LABS:                        7.3    7.82  )-----------( 188      ( 04 Mar 2021 05:17 )             24.5       03-04    137  |  99  |  62<HH>  ----------------------------<  112<H>  4.6   |  24  |  6.3<HH>    Ca    8.6      04 Mar 2021 05:17  Mg     2.5     03-04    TPro  5.4<L>  /  Alb  3.3<L>  /  TBili  0.2  /  DBili  x   /  AST  10  /  ALT  7   /  AlkPhos  92  03-04    PT/INR - ( 02 Mar 2021 15:57 )   PT: 13.80 sec;   INR: 1.20 ratio         PTT - ( 02 Mar 2021 15:57 )  PTT:27.1 sec    CAPILLARY BLOOD GLUCOSE  POCT Blood Glucose.: 108 mg/dL (04 Mar 2021 12:43)  POCT Blood Glucose.: 122 mg/dL (04 Mar 2021 07:47)  POCT Blood Glucose.: 150 mg/dL (03 Mar 2021 22:00)  POCT Blood Glucose.: 99 mg/dL (03 Mar 2021 15:39)          MEDICATIONS  (STANDING):  aMIOdarone    Tablet 200 milliGRAM(s) Oral daily  aspirin  chewable 81 milliGRAM(s) Oral daily  atorvastatin 20 milliGRAM(s) Oral at bedtime  chlorhexidine 4% Liquid 1 Application(s) Topical <User Schedule>  ergocalciferol 15482 Unit(s) Oral <User Schedule>  heparin   Injectable 5000 Unit(s) SubCutaneous every 8 hours  insulin glargine SubCutaneous Injection (LANTUS) - Peds 5 Unit(s) SubCutaneous at bedtime  insulin lispro (ADMELOG) corrective regimen sliding scale   SubCutaneous three times a day before meals  melatonin 5 milliGRAM(s) Oral at bedtime  metolazone 5 milliGRAM(s) Oral daily  metoprolol succinate ER 25 milliGRAM(s) Oral two times a day  piperacillin/tazobactam IVPB.. 2.25 Gram(s) IV Intermittent every 8 hours  sertraline 50 milliGRAM(s) Oral daily  sevelamer carbonate 1600 milliGRAM(s) Oral three times a day    MEDICATIONS  (PRN):  acetaminophen   Tablet .. 650 milliGRAM(s) Oral every 6 hours PRN Temp greater or equal to 38C (100.4F), Mild Pain (1 - 3)  oxyCODONE    IR 5 milliGRAM(s) Oral every 6 hours PRN Moderate Pain (4 - 6)        RADIOLOGY & ADDITIONAL TESTS:      < from: Xray Chest 1 View- PORTABLE-Routine (Xray Chest 1 View- PORTABLE-Routine in AM.) (03.04.21 @ 05:41) >  Increased bibasilar opacities and effusions, left greater than right. No pneumothorax.    < end of copied text >  < from: CT Pelvis w/ IV Cont (03.02.21 @ 18:03) >  Left perirectal fluid collection measures approximately 8.6 x 6.0 x 5.6 cm    Severe uterine and rectal prolapse.    Mild-to-moderate ascites in the visualized pelvis.    < end of copied text >

## 2021-03-04 NOTE — CONSULT NOTE ADULT - ASSESSMENT
Patient is a 70y old  Female with left buttock pain that began 2 days ago; associated with external hemorrhoids (non bleeding). No fevers, chills; drainage from area. +pain with defecation. Pt known to Dr. Mota - saw him in office and sent here to R/O buttock abscess.  She underwent drainage of the abscess and admitted to ICU for close monitoring. She has started on zosyn, and the ID consult requested to assist with further evaluation and antibiotic management.     # Left Perirectal Abscess- s/p drainage     would recommend;    1. Follow up Abscess culture and adjust Abx accordingly   2. Continue Zosyn for now  3. Dialysis as tolerated   4. Wound care as per Surgery    will follow the patient with you and make further recommendation based on the clinical course and Lab results  Thank you for the opportunity to participate in Ms. WILBURN's care      Attending Attestation:    Spent more than 65 minutes on total encounter, more than 50 % of the visit was spent counseling and/or coordinating care by the Attending physician.       Patient is a 70y old  Female with left buttock pain that began 2 days ago; associated with external hemorrhoids (non bleeding). No fevers, chills; drainage from area. +pain with defecation. Pt known to Dr. Mota - saw him in office and sent here to R/O buttock abscess.  She underwent drainage of the abscess and admitted to ICU for close monitoring. She has started on zosyn, and the ID consult requested to assist with further evaluation and antibiotic management.     # Left Perirectal Abscess- s/p drainage     would recommend;    1. Follow up Abscess culture and adjust Abx accordingly   2. Continue Zosyn for now  3. Dialysis as tolerated   4. Wound care as per Surgery    d/w  ICU team and patient     will follow the patient with you and make further recommendation based on the clinical course and Lab results  Thank you for the opportunity to participate in Ms. WILBURN's care      Attending Attestation:    Spent more than 65 minutes on total encounter, more than 50 % of the visit was spent counseling and/or coordinating care by the Attending physician.

## 2021-03-04 NOTE — CHART NOTE - NSCHARTNOTEFT_GEN_A_CORE
pt Hg dropped to 7.7 from 8.4. Discussed with Surgery PA and will transfuse 1unit PRBC, and pt is also getting HD today, Will f/u with repeat H&H

## 2021-03-04 NOTE — PHARMACOTHERAPY INTERVENTION NOTE - COMMENTS
Not on DVT ppx. . No major contraindication for dvt ppx. Rec to order
Pt on HD; rec to increase zosyn dose from q12hr to q8hr
Rec to check Phos level while on high dose of sevelamer
Noted during rounds that pt is on dialysis and low BP. Notified MD that home lasix 40mg po bid dose increased to 80mg po bid. Rec to consider holding or decrease
Metoprolol succinate ER dosed bid instead of daily. Rec to change to daily brina in setting of low BP.

## 2021-03-04 NOTE — PHARMACOTHERAPY INTERVENTION NOTE - INTERVENTION TYPE RECOOMEND
Dose Optimization/Non-renal Dose Adjustment
Therapy Recommended - Drug indicated but not ordered
Dose Optimization/Non-renal Dose Adjustment

## 2021-03-05 NOTE — CHART NOTE - NSCHARTNOTEFT_GEN_A_CORE
MEDICINE  KEHINDE Reza   Spectra:[id number]      Patient seen at bedside resting comfortably.    T(C): 35.9 (03-05-21 @ 05:00), Max: 37 (03-04-21 @ 19:05)  HR: 66 (03-05-21 @ 05:00) (66 - 83)  BP: 105/56 (03-05-21 @ 05:00) (105/56 - 139/70)  RR: 21 (03-05-21 @ 05:00) (18 - 39)  SpO2: 100% (03-04-21 @ 20:31) (87% - 100%)    Patient without questions of concerns at this time.    Patient encouraged to contact PA with any further questions of concerns. Medicine KEHINDE Reza [id number]      PA was called by RN to start an IV, due to poor venous access secondary to unknown vascular abnormality.    The ultrasound was used to identify a suitable vein for venipuncture. The site was cleansed with chlorhexidine and the right.  basilic vein was punctured with ultrasound guidance. The catheter was advanced without resistance, demonstrated good blood return and flushed easily. The IV was secured to the patient using semi permeable occlusive dressing and tape.   The patient tolerated the procedure well.  RN advised to monitor site and manage according to protocol.    Patient states no new medical complaints.      T(C): 36.1 (03-05-21 @ 14:07), Max: 37 (03-04-21 @ 19:05)  HR: 77 (03-05-21 @ 14:07) (66 - 83)  BP: 131/63 (03-05-21 @ 14:07) (105/56 - 131/63)  RR: 21 (03-05-21 @ 05:00) (21 - 24)  SpO2: 100% (03-04-21 @ 20:31) (87% - 100%)    Patient without questions of concerns at this time.    Patient encouraged to contact PA with any further questions of concerns. Medicine KEHINDE Reza [id number]      Patient states no new medical complaints.      T(C): 36.1 (03-05-21 @ 14:07), Max: 37 (03-04-21 @ 19:05)  HR: 77 (03-05-21 @ 14:07) (66 - 83)  BP: 131/63 (03-05-21 @ 14:07) (105/56 - 131/63)  RR: 21 (03-05-21 @ 05:00) (21 - 24)  SpO2: 100% (03-04-21 @ 20:31) (87% - 100%)    Patient without questions of concerns at this time.    Patient encouraged to contact PA with any further questions of concerns.

## 2021-03-05 NOTE — CHART NOTE - NSCHARTNOTEFT_GEN_A_CORE
Medicine KEHINDE Reza [id number]      PA was called by RN to start an IV, due to poor venous access secondary to unknown vascular abnormality.    The ultrasound was used to identify a suitable vein for venipuncture. The site was cleansed with chlorhexidine and the right  Basilic vein was punctured with ultrasound guidance. The catheter was advanced without resistance, demonstrated good blood return and flushed easily. The IV was secured to the patient using semi permeable occlusive dressing and tape.    The patient tolerated the procedure well.  RN advised to monitor site and manage according to protocol.

## 2021-03-06 NOTE — PROGRESS NOTE ADULT - PROBLEM SELECTOR PLAN 1
.  - Case d/w Dr. Mota today and states for Packing and dressing change tomorrow.   - Continue Ivabx -- Patient currently on Piperacillin/Tazobactam IVPB 2.25 Gram(s) IV every 8 hours.   - Monitor CBC / WBC.  Will order for am.   - Pain medications as needed with Oxycodone as needed.  - Encourage OOB to chair and ambulation with assistance to relieve pressure off abscess area.   - Continue present management.    - Case d/w Dr Mota and will follow.   - Patient verbalizes understanding of Dr. Mota's plan and all questions answered to patient's satisfaction. .  - Case d/w Dr. Mota today and states for Packing and dressing change tomorrow.   - Continue Ivabx -- Patient currently on Piperacillin/Tazobactam IVPB 2.25 Gram(s) IV every 8 hours.  ID following.  - Monitor CBC / WBC.  Will order for am.   - Pain medications as needed with Oxycodone as needed.  - Encourage OOB to chair and ambulation with assistance to relieve pressure off abscess area.   - VTE prophylaxis -- Heparin SQ / OOB and ambulate.   - Continue present management.    - Case d/w Dr Mota and will follow.   - Patient verbalizes understanding of Dr. Mota's plan and all questions answered to patient's satisfaction.

## 2021-03-06 NOTE — PROGRESS NOTE ADULT - PROBLEM SELECTOR PLAN 4
.  - Monitor vital signs per unit protocol.  - Continue home medications. (Metoprolol and Amiodarone).

## 2021-03-06 NOTE — PROGRESS NOTE ADULT - PROBLEM SELECTOR PLAN 2
.  - Continue present mgt per Nephrology with hemodialysis.  Patient states due for HD today.  Monitor post HD BMP.    - Continue Sevelamer carbonate 1600 milliGRAM(s) Oral three times a day.

## 2021-03-06 NOTE — PROGRESS NOTE ADULT - PROBLEM SELECTOR PLAN 3
.  - Continue Renal / Low carb diet.   - Monitor Fingersticks TID and qHS.  - Continue Insulin sliding scale PRN.  - Continue home medications.  Insulin glargine SQ Injection (LANTUS) - 5 Unit(s) at bedtime

## 2021-03-08 NOTE — DISCHARGE NOTE PROVIDER - NSDCCPCAREPLAN_GEN_ALL_CORE_FT
PRINCIPAL DISCHARGE DIAGNOSIS  Diagnosis: Abscess of buttock, left  Assessment and Plan of Treatment: s/p incision and drainage  PAIN: You may take Tylenol 650mg every 6-8 hours as needed for mild to moderate pain. Take Percocet every 6-8 hours as needed for breakthrough, severe pain.   ANTIBIOTICS: Take Levaquin 500mg once every 48 hours until 3/13.   DRESSING: Change dressing every other day. Irrigate wound with saline flushes. Remove packing. Repack with 1 inch plain packing. Cover with dry 4x4s. Keep in place with underwear or Depends.   ACTIVITY: You may shower normally. Before the dressing changes, you may sit/soak in warm water and remove the packing while in the tub. This may help alleiviate pain.  FOLLOW UP: With Dr. Mota in 1 week. Call to schedule an appointment.

## 2021-03-08 NOTE — DISCHARGE NOTE PROVIDER - NSDCMRMEDTOKEN_GEN_ALL_CORE_FT
amiodarone 200 mg oral tablet: 1 tab(s) orally once a day  aspirin 81 mg oral tablet, chewable: 1 tab(s) orally once a day  atorvastatin 20 mg oral tablet: 1 tab(s) orally once a day  hydrALAZINE 25 mg oral tablet: 1 tab(s) orally 3 times a day, hold if SBP &lt;100  INSULIN LISPRO KWIKPEN  100 UNIT/ML SOPN:   Lantus Solostar Pen 100 units/mL subcutaneous solution: 5 unit(s) subcutaneous once a day (at bedtime); hold if glucose &lt;100   METOLAZONE  5 MG TABS: 1  orally once a day  METOPROLOL SUCCINATE ER  25 MG TB24: 1  orally 2 times a day  SERTRALINE HCL  50 MG TABS:   SEVELAMER CARBONATE 800MG TABLETS: TK 2 TS PO TID WITH MEALS  TORSEMIDE 20MG TABLETS: TK 3 TS PO BID  VITAMIN D2 [id number] (ERGO) CAP RX: TK 1 C PO WEEKLY   amiodarone 200 mg oral tablet: 1 tab(s) orally once a day  aspirin 81 mg oral tablet, chewable: 1 tab(s) orally once a day  atorvastatin 20 mg oral tablet: 1 tab(s) orally once a day  Augmentin 500 mg-125 mg oral tablet: 1 tab(s) orally once a day . On hemodialyisis days, take AFTER dialysis is completed  hydrALAZINE 25 mg oral tablet: 1 tab(s) orally 3 times a day, hold if SBP &lt;100  INSULIN LISPRO KWIKPEN  100 UNIT/ML SOPN:   Lantus Solostar Pen 100 units/mL subcutaneous solution: 5 unit(s) subcutaneous once a day (at bedtime); hold if glucose &lt;100   METOLAZONE  5 MG TABS: 1  orally once a day  METOPROLOL SUCCINATE ER  25 MG TB24: 1  orally 2 times a day  SERTRALINE HCL  50 MG TABS:   SEVELAMER CARBONATE 800MG TABLETS: TK 2 TS PO TID WITH MEALS  TORSEMIDE 20MG TABLETS: TK 3 TS PO BID  VITAMIN D2 [id number] (ERGO) CAP RX: TK 1 C PO WEEKLY

## 2021-03-08 NOTE — DISCHARGE NOTE PROVIDER - NSDCFUSCHEDAPPT_GEN_ALL_CORE_FT
GUS WILBURN ; 03/11/2021 ; [facility]  GUS WILBURN ; 03/16/2021 ; [facility]  GUS WILBURN ; 03/31/2021 ; [facility] [address]  GUS WILBURN ; 04/28/2021 ; [facility] [address] [place] [address]

## 2021-03-08 NOTE — DISCHARGE NOTE PROVIDER - CARE PROVIDER_API CALL
Aspen Mota  SURGERY  [address] [place] [address]  Phone: [phone]  Fax: [phone]  Follow Up Time:    Aspen Mota  SURGERY  [address] [place] [address]  Phone: [phone]  Fax: [phone]  Follow Up Time: 1 week

## 2021-03-08 NOTE — DISCHARGE NOTE PROVIDER - NSDCFUADDINST_GEN_ALL_CORE_FT
Continue daily dressing changes as per visiting nurse  Please make F/U appointment with Dr. Mota in one week

## 2021-03-08 NOTE — PROGRESS NOTE ADULT - ATTENDING COMMENTS
above  noted discussed case with surgical PA abscess improving for d/c in am
above noted discussed case with surgical PA dressing noted awaiting result of c/s
above noted discussed case with surgical resident dressing changed
above noted discussed case with surgical resident dressing dry on IV AB
above noted discussed case with surgical resident and pt abscess wound better

## 2021-03-08 NOTE — DISCHARGE NOTE PROVIDER - HOSPITAL COURSE
70F admitted with PMH of chronic montejo catheter, CHF, DM, ESRD on HD, and uterine prolapse who presented with left buttock pain that started 2 days prior to admission. CT A/P showed left perirectal collection measuring 8.6 x 6 x 5.6 cm. Pt was taken to the OR for I&D with findings of 100cc pus. Wound was left open with iodoform packing. Post-operatively pt was monitored in ICU overnight d/t comorbidities. She was downgraded in stable condition on POD1. She was started on IV zosyn. Wound cultures were final NGTD. Pt was discharged home in stable condition on PO levaquin with dressing changes every other day. At the time of discharge, she was medically stable with pain controlled and tolerating diet.

## 2021-03-09 NOTE — DISCHARGE NOTE NURSING/CASE MANAGEMENT/SOCIAL WORK - PATIENT PORTAL LINK FT
You can access the FollowMyHealth Patient Portal offered by [facility] by registering at the following website: [email or web address]. By joining [facility]’s FollowMyHealth portal, you will also be able to view your health information using other applications (apps) compatible with our system.

## 2021-03-09 NOTE — PROGRESS NOTE ADULT - ASSESSMENT
1. L perirectal abscess. S/P I&D in OR by Dr Mota. Wound care by surgery. Abx per ID.  2. ESRD on HD MWF. TTS in-hospital. HD tomorrow: 3 hours, opti 160 dialyzer, 2K bath, 1.5L UF.  3. HTN. Continue metoprolol.   4. Anemia. EPO with HD.  5. Secondary hyperparathyroidism. Hectorol with HD.  6. Hyperphosphatemia. Sevelamer with meals.
1. L perirectal abscess. S/P I&D yesterday in OR by Dr Mota. Wound care by surgery.  2. ESRD on HD MWF. TTS in-hospital. HD today: 3 hours, opti 160 dialyzer, 2K bath, 2L UF.  3. HTN. Continue metoprolol. Resume Lasix tomorrow.  4. Anemia. EPO with HD.  5. Secondary hyperparathyroidism. Hectorol with HD.  6. Hyperphosphatemia. Sevelamer with meals.
69 y/o female s/p I&D of Left buttock abscess -- (POD # 3)
A/P:  GUS WILBURN is a 70yFemale POD4  from Incision and drainage of wound of buttock    Culture - Abscess with Gram Stain (collected 03-03-21 @ 14:07)  Source: .Abscess LEFT BUTTOCK  Preliminary Report (03-04-21 @ 20:33):    No growth  Culture - Abscess with Gram Stain (collected 03-03-21 @ 14:05)  Source: .Abscess LEFT BUTTOCK  Preliminary Report (03-04-21 @ 20:30):    No growth      PLAN:   -Pain control  -Incentive spirometry  -Monitor 'lytes, replete prn  -HD schedule per nephrology, TTS while inpatient  -DVT/GI ppx  -Continue home insulin regimen  --Abx: piperacillin/tazobactam IVPB.. 2.25 Gram(s) IV Intermittent every 8 hours  - F/u ID for outpatient PO regimen    
Patient is a 70y old  Female with left buttock pain that began 2 days ago; associated with external hemorrhoids (non bleeding). No fevers, chills; drainage from area. +pain with defecation. Pt known to Dr. Mota - saw him in office and sent here to R/O buttock abscess.  She underwent drainage of the abscess and admitted to ICU for close monitoring. She has started on zosyn, and the ID consult requested to assist with further evaluation and antibiotic management.     # Left Perirectal Abscess- s/p drainage - CX has no growth to date    would recommend;    1. OOB to chair  2. Continue Zosyn while inpatient and may change to oral Levaquin 500 mg q 48 hours on discharge to continue until 3/13/21  3. Dialysis as tolerated   4. Wound care as per Surgery    d/w patient     Attending Attestation:    Spent more than 45 minutes on total encounter, more than 50 % of the visit was spent counseling and/or coordinating care by the Attending physician.  
Patient is a 70y old  Female with left buttock pain that began 2 days ago; associated with external hemorrhoids (non bleeding). No fevers, chills; drainage from area. +pain with defecation. Pt known to Dr. Mota - saw him in office and sent here to R/O buttock abscess.  She underwent drainage of the abscess and admitted to ICU for close monitoring. She has started on zosyn, and the ID consult requested to assist with further evaluation and antibiotic management.     # Left Perirectal Abscess- s/p drainage with 100 cc of pus   - wound Cx 3/3  NG    Recommendations  - continue zosyn for now  - follow-up Wound Cx until final  - Dialysis as tolerated   - Wound care as per Surgery    Please call or message on Microsoft Teams if with any questions.  Spectra [phone]  
S/p I&D of left buttock abscess, POD#2  -f/u abscess cx  -c/w iv abx  -will change surgical dressing today    #ESRD on HD  -nephrology following    #DM  -ada diet  -monitor BS  -cont glargine  -insulin coverage prn elevated blood sugar    #CHF/CAD  -c/w meds  -cardiology following    #urine retention  -c/w chronic montejo  
1. L perirectal abscess. S/P I&D in OR by Dr Mota. Wound care by surgery. Abx per ID.  2. ESRD on HD MWF. TTS in-hospital. HD today: 3 hours, opti 160 dialyzer, 2K bath, 1.5L UF.  3. HTN. Continue metoprolol.   4. Anemia. EPO with HD.  5. Secondary hyperparathyroidism. Hectorol with HD.  6. Hyperphosphatemia. Sevelamer with meals.
1. L perirectal abscess. S/P I&D in OR by Dr Mota. Wound care by surgery. Abx per ID.  2. ESRD on HD MWF. TTS in-hospital. HD tomorrow: 3 hours, opti 160 dialyzer, 2K bath, 1.5L UF.  3. HTN. Continue metoprolol.   4. Anemia. EPO with HD.  5. Secondary hyperparathyroidism. Hectorol with HD.  6. Hyperphosphatemia. Sevelamer with meals.
70F w/ PMH of DM, ESRD on HD M/W/F (in the hospital Tu/Th/Sat), CAD s/p CABG, HLD, chronic montejo, uterine prolapse, and CHF admitted for L perirectal abscess s/p drainage 3/3.     PLAN:    - pt for likely D/C home today after HD  - packing changed today  - Per ID:  Levaquin 500 mg PO Q 48 hrs until 3/13  - f/u LE doppler today  - will d/w Dr. Mota
S/p I&D of left buttock abscess, POD#1  -f/u abscess cx  -c/w iv abx  -leave surgical dressing in place
70 year old female with ESRD (on HD), HFrEF (20-25%), CAD (s/p CABG 11/2019), chronic left pleural effusion (multiple taps), DM2 (on Insulin), bladder prolapse presented for I&D of perirectal abscess, upgraded to ICU post-op for hemodynamic monitoring (low BP).    #Left buttock abscess  S/p I&D post-op day 1  Dressing care per surgery team  On Zosyn 2.25 mg q8  F/u ID  F/u abscess cultures    #ESRD on HD (MWF)  Nephro following  Started Sevelamer 1600mg TID    #HTN  BP on the lower side  holding Hydralazine 10mg TID  Holding Toprol 25mg BID (confirmed dose with [facility]'s pharmacy)     #Anemia  EPO with HD    #HFrEF  Holding Torsemide and Metolazone for now    #DM2  Holding home Insulin  FS controlled, monitor    #DL  On Statin    #DVT PPX: Heparin  #GI PPX: Not indicated  #Diet: Renal  #Activity: As tolerated  #Dispo: From home
70F w/ PMH of DM, ESRD on HD M/W/F (in the hospital Tu/Th/Sat), CAD s/p CABG, HLD, chronic montejo, uterine prolapse, and CHF admitted for L perirectal abscess s/p drainage 3/3.     PLAN:   - continue packing changes -- will change packing today  - plan for DC tomorrow after HD  - needs VNS for packing changes  - IV zosyn while inpatient, DC on PO levaquin per ID  - d/w Dr. Mota
IMPRESSION:  perirectal abscess s/p drainage  labile BP continues  early sepsis  ESRD on RRT  chronic heart failure  ascites  diabetes    SUGGEST:  monitor in ICU  full cultures  empiric abx. Zosyn renal doses 2.25 gm Q12 extra 0.75 gm after each HD session start today  renal f/u for RRT likely tomorrow  routine labs/ CXR in AM  pressors if needed  ID consult  renal card f/u  DVT gastritis prophylaxis  diet may need insulin protocol
Patient with no complaints. Lying flat. Bp decreased. Hold beta this am prior to dialysis. She normally holds beta on day of dialysis. Transfuse if needed today. After dialysis if stable transfer to floor

## 2021-03-09 NOTE — DIETITIAN INITIAL EVALUATION ADULT. - PERTINENT LABORATORY DATA
(3/7) RBC 3.15, H/H 9.0/28.7, BUN 40, Cr 4.1, glucose 64, Mg 2.5; (3/9) POC glucose 77-81 (WNL); (3/3) A1c 5.1

## 2021-03-09 NOTE — DIETITIAN INITIAL EVALUATION ADULT. - PHYSCIAL ASSESSMENT
wt hx not obtained, as mentioned above (pt provided minimal information today). No overt s/s muscle wasting or fat loss per RD observation.    No edema noted; skin assessment shows surgical incision. well nourished/other (specify)

## 2021-03-09 NOTE — DIETITIAN INITIAL EVALUATION ADULT. - FACTORS AFF FOOD INTAKE
Pt states that her appetite has been as good as it normally is pta. She states that she does not love the food here in the hospital but states that she has been eating regardless. Per EMR, po intake varies 50-75% throughout LOS. Last BM on 3/7, no issues with diarrhea or constipation of note./none

## 2021-03-09 NOTE — DIETITIAN INITIAL EVALUATION ADULT. - PERTINENT MEDS FT
heparin, zosyn, lantus, admelog, mylanta, hectorol, lipitor, pacerone, ergocalciferol, metoprolol, oxycodone, renvela

## 2021-03-09 NOTE — PROGRESS NOTE ADULT - PROVIDER SPECIALTY LIST ADULT
Critical Care
Nephrology
Surgery
Surgery
Cardiology
Critical Care
Infectious Disease
Surgery
Infectious Disease
Nephrology
Surgery
Surgery
Nephrology
Surgery

## 2021-03-09 NOTE — DIETITIAN INITIAL EVALUATION ADULT. - OTHER INFO
Pt admitted d/t abscess of L buttocks. L perirectal abscess sp I&D in OR; wound care per surgery; abx per ID. Pt with ESRD on HD.

## 2021-03-09 NOTE — DIETITIAN INITIAL EVALUATION ADULT. - ORAL INTAKE PTA/DIET HISTORY
Pt has a good appetite at baseline. She reports that she cooks for herself at home typically. NKFA/intolerances. No food preferences r/t culture/Yazdanism. Unable to offer d/c diet education as pt does not want to further participate with RD assessment.

## 2021-03-09 NOTE — DIETITIAN INITIAL EVALUATION ADULT. - OTHER CALCULATIONS
wt used: dosing 58.3 kg; Kcal: 8675-8060 kcal/d (MSJ x 1.2-1.4 AF) ESRD on HD considered; Protein: 70-82 g (1.2-1.4 g/kg) same as above; Fluid: 1000 mL + UOP or per LIP

## 2021-03-09 NOTE — DIETITIAN INITIAL EVALUATION ADULT. - ADD RECOMMEND
continue the current diet order as tolerated, encourage po intake, provide assistance with meals PRN; if po intake consistently <50% upon f/u, will consider the need for oral nutrition supplements-- no need at this time.

## 2021-03-16 NOTE — CDI QUERY NOTE - NSCDIOTHERTXTBX_GEN_ALL_CORE_HH
POST-OP DIAGNOSIS:  Perirectal abscess 03-Mar-2021 14:59:32  Jeramie Singh.  ·  PROCEDURES:  Incision and drainage of wound of buttock 03-Mar-2021 14:59:11  Jeramie Singh.      • Operative Findings	left perirectal abscess, opened with 100cc pus. Wound left open with 2" iodoform packing    Documentation from Op report: The re was a large incision made to cover the abscess and skin carried down to subcutaneous tissue. Dissection was carried through the depth of the abscess. The abscess cavity was entered. A large amount of purulent material was drained. At least 100-150ml of purulent material was drained.   Thereafter, the incision was extended.   …Debridement was done of all the necrotic tissue all the way down to the base of the abscess. … Further irrigation and debridement was done…Dressing was applied.     Query  Please clarify the details of the procedure:  Type of debridement:   -	Excisional debridement  -	Non-excisional  Depth of the debridement:  -	Down to and included the subcutaneous tissue and fascia  -	Down to and including the bone  -	Other(please specify)

## 2021-04-01 NOTE — CONSULT NOTE ADULT - ASSESSMENT
69 yo F with PMH of CAD s/p CABG (November 2019), HFrEF (EF 20%), AS, ESRD (MWF), bladder prolapse requiring chronic Castellon, Diabetes Type 2 on insulin.      Get BMP daily   Maintain potassium >4.0, Mg >2.1  Strict intake and output  Daily weight   Will continue to follow  71 yo F with PMH of CAD s/p CABG (November 2019), HFrEF (EF 20%), AS, ESRD (MWF), bladder prolapse requiring chronic Castellon, Diabetes Type 2 on insulin.      Syncope / C spine fracture/ possible ventricular arrhythmia/ Systolic HF     Patient is intubated but awake and follows simple orders   She is fluid overloaded - HD per nephro   Continue pressors support - wean as tolerated to keep MAP > 65   Hold HF meds   Get TTE   EP evaluation as patient will need ICD on this admission   Management of vent and sedation per PCCM   Discussed with primary team and family at bedside   Will continue to follow

## 2021-04-01 NOTE — ED PROCEDURE NOTE - CPROC ED TIME OUT STATEMENT1
“Patient's name, , procedure and correct site were confirmed during the [place] Timeout.”
“Patient's name, , procedure and correct site were confirmed during the [place] Timeout.”
“Patient's name, , procedure and correct site were confirmed during the [place] Timeout.”

## 2021-04-01 NOTE — CONSULT NOTE ADULT - SUBJECTIVE AND OBJECTIVE BOX
Specialty: Neuro Critical Care       HPI:  69 yo F with PMH of CAD s/p CABG (2019), HFrEF (EF 20%), AS, ESRD (MWF), bladder prolapse requiring chronic Castellon, Diabetes Type 2 on insulin presents from home s/p mechanical fall. When the history was taken in the emergency department, she The denied any dizziness/ palpitations/ aura/vertigo. She reported non-radiating R shoulder pain and non-radiating R hip pain on arrival. She was found to have R humeral fracture / R hip fracture / non displaced odontoid fracture/ acute rib fractures/ ? T6 vertebral fracture.     In the emergency department, while the patient was receiving trauma work up she was found pulseless by a PCA in the room ( she was not on the monitor at that time) and had a cardiac arrest. ROSC was achieved after 2 minutes of CPR (1 epinephrine was given and 1 calcium chloride iv). Patient was intubated and sedated. EKG showed RBBB with wide complex tachycardia. Per family at the bedside, the patient had her last hemodialysis yesterday and has been relatively non compliant with her medications and medical care at home. Unable to obtain ros as patient is sedated.     T(C): 35 , HR: 60, BP: 158/67, RR: 20, SpO2: 99% vented  Labs: d-dimer ~ 4700, p-bnp 70,000, trop (0.20 <--0.17)  ABG pH, Arterial: 7.45, CO2: 36, Arterial O2: 63 , HCO3: 25 , Base Excess: 1.4 , SaO2: 94      CT (-)      (2021 07:42)    While in ED pt experienced seizure-like episode while sedation was being weaned for possible extubation. Suddenly became unresponsive, staring to the right, followed by jerking movements of arms and legs. She was given 2mg Versed at that time, with cessation of seizure activity after 5 minutes. Patient was put back on sedation. Per family at bedside, prior to event patient had been alert and following commands. Family states they witnessed one additional episode after this, which lasted for 1 minute before stopping spontaneously. Family states pt has no prior Hx of seizures. Pt had not complained of any new pain or illness prior to falling this morning, although family notes that she was had been having gradually increasing VOGEL over the past week and would have to frequently stop to catch her breath when walking.      Past Medical and Surgical Hx:  PAST MEDICAL & SURGICAL HISTORY:  Diabetes    Congestive heart failure (CHF)    Pleural effusion due to congestive heart failure    End stage renal disease  on dialysis Mon, Wed, Fr    Uterine prolapse    Chronic indwelling Castellon catheter    History of repair of hip fracture      S/P CABG (coronary artery bypass graft)  2019    History of thoracentesis    Chronic indwelling Castellon catheter        Allergies: No Known Allergies      ROS:   Patient unable to participate in ROS due to neurologic status      PE:  Gen: WN/WD female lying supine in bed, intubated and sedated with C-collar present  Mental status: Sedated. Grimaces to noxious stimuli but no EO. Does not follow commands  Cranial nerves: PERRL, no nystagmus or dysconjugate gaze. Face grossly symmetric. (+) cough. Oculocephalic deferred  Motor:  Moves all extremities spontaneously  Sensation: Grimaces to noxious stimuli      Vital Signs:  ICU Vital Signs Last 24 Hrs  T(C): 35.9 (2021 15:15), Max: 35.9 (2021 15:15)  T(F): 96.7 (2021 15:15), Max: 96.7 (2021 15:15)  HR: 68 (2021 20:23) (60 - 99)  BP: 158/67 (2021 06:36) (86/55 - 158/67)  BP(mean): --  ABP: 109/49 (2021 20:23) (82/50 - 165/77)  ABP(mean): 68 (2021 20:23) (59 - 107)  RR: 20 (2021 20:23) (18 - 20)  SpO2: 100% (2021 20:23) (95% - 100%)      Ventilator:  Mode: AC/ CMV (Assist Control/ Continuous Mandatory Ventilation)  RR (machine): 20  TV (machine): 450  FiO2: 40  PEEP: 5  ITime: 1  MAP: 14  PIP: 34      Medications Current and PRN:  MEDICATIONS  (STANDING):  aMIOdarone    Tablet 200 milliGRAM(s) Oral daily  aspirin  chewable 81 milliGRAM(s) Oral daily  atorvastatin Oral Tab/Cap - Peds 40 milliGRAM(s) Oral daily  cefepime   IVPB 2000 milliGRAM(s) IV Intermittent once  chlorhexidine 0.12% Liquid 15 milliLiter(s) Oral Mucosa every 12 hours  chlorhexidine 4% Liquid 1 Application(s) Topical <User Schedule>  dexMEDEtomidine Infusion 0.2 MICROgram(s)/kG/Hr (3.3 mL/Hr) IV Continuous <Continuous>  fentaNYL   Infusion. 0.5 MICROgram(s)/kG/Hr (3.3 mL/Hr) IV Continuous <Continuous>  heparin SubCutaneous Injection - Peds 5000 Unit(s) SubCutaneous every 12 hours  hydrALAZINE 25 milliGRAM(s) Oral three times a day  meropenem  IVPB      meropenem  IVPB 500 milliGRAM(s) IV Intermittent every 12 hours  metolazone Oral Tab/Cap - Peds 5 milliGRAM(s) Oral daily  norepinephrine Infusion 0.05 MICROgram(s)/kG/Min (6.19 mL/Hr) IV Continuous <Continuous>  pantoprazole  Injectable 40 milliGRAM(s) IV Push daily  propofol Infusion 10 MICROgram(s)/kG/Min (3.96 mL/Hr) IV Continuous <Continuous>  senna 2 Tablet(s) Oral daily  torsemide 80 milliGRAM(s) Oral two times a day        I/Os:  I&O's Summary    2021 07:01  -  2021 21:58  --------------------------------------------------------  IN: 150 mL / OUT: 0 mL / NET: 150 mL        Labs:      138  |  101  |  39<H>  ----------------------------<  227<H>  5.1<H>   |  16<L>  |  3.8<H>    Ca    9.2      2021 18:27  Mg     2.5         TPro  5.9<L>  /  Alb  3.7  /  TBili  0.3  /  DBili  x   /  AST  49<H>  /  ALT  32  /  AlkPhos  135<H>                            9.3    12.65 )-----------( 181      ( 2021 18:27 )             30.6     PT/INR - ( 2021 18:27 )   PT: 15.00 sec;   INR: 1.30 ratio         PTT - ( 2021 18:27 )  PTT:32.8 sec  LIVER FUNCTIONS - ( 2021 18:27 )  Alb: 3.7 g/dL / Pro: 5.9 g/dL / ALK PHOS: 135 U/L / ALT: 32 U/L / AST: 49 U/L / GGT: x           ABG - ( 2021 18:14 )  pH, Arterial: 7.22  pH, Blood: x     /  pCO2: 40    /  pO2: 179   / HCO3: 16    / Base Excess: -10.6 /  SaO2: 97                  Microbiology:    Urinalysis Basic - ( 2021 02:50 )    Color: Light Yellow / Appearance: Clear / S.011 / pH: x  Gluc: x / Ketone: Negative  / Bili: Negative / Urobili: <2 mg/dL   Blood: x / Protein: Negative / Nitrite: Negative   Leuk Esterase: Moderate / RBC: 2 /HPF / WBC 8 /HPF   Sq Epi: x / Non Sq Epi: 1 /HPF / Bacteria: Many        Radiology:  EXAM:  CT BRAIN          PROCEDURE DATE:  2021        INTERPRETATION:  Clinical History / Reason for exam: Trauma.    Technique: Contiguous axial CT images were obtained from the base of the skull to the vertex without administration ofintravenous contrast. Coronal and sagittal reformatted images were reconstructed.    Comparison: None available.    Findings:    The ventricles and cortical sulci are appropriate for the patient's stated age.    No intracranial hemorrhage is identified. There are no extra-axial fluid collections, space occupying processes, mass-effect, or midline shift.    There are patchy hypodensities throughout the hemispheric white matter without mass effect compatible with chronic microvascular changes.    Atherosclerotic calcifications are present at the skull base.    Gray white matter differentiation is preserved.    Evaluation of the calvarium demonstrates no bony abnormalities. The visualized paranasal sinuses and mastoids are well aerated.    IMPRESSION:    No evidence of acute intracranial pathology.        EXAM:  CT CERVICAL SPINE        PROCEDURE DATE:  2021        INTERPRETATION:  Clinical History / Reason for exam: Trauma.    Technique:  Multiple axial CT images of the cervical spine were obtained with coronal and sagittal reformats.    No comparisons available.    FINDINGS:     radiograph demonstrates endotracheal tube and feeding tube in place. Comminuted fracture right humeral neck.    Lucency through odontoid extending into the lateral masses of C2 suggesting odontoid fracture. Small volume of blood within the anterior extradural space.    Vertebral body heights are maintained. Loss of intervertebral disc space involving C4-5, C5-6 and C6-7.    Multilevel chronic degenerative changes of the spine.    Partially imagedright pleural effusion. Bilateral central venous catheter, partially imaged.    IMPRESSION:    Lucency through base of odontoid extending into the lateral masses of C2 suggesting nondisplaced fracture. Small volume of blood within anterior extraduralspace. Consider further evaluation with MRI as clinically warranted.    Comminuted fracture right humeral neck, visualized on .          ******PRELIMINARY REPORT******    ******PRELIMINARY REPORT******          EXAM:  CT BRAIN        PROCEDURE DATE:  2021        ******PRELIMINARY REPORT******    ******PRELIMINARY REPORT******          INTERPRETATION:  CLINICAL HISTORY / REASON FOR EXAM: Trauma. Seizure.    TECHNIQUE: Multiple axial CT images of the head were obtained with sagittal and coronal reformats from the base of the skull to the vertex without the administration of IV contrast.    COMPARISON: CT HEAD 2021 5:47 AM.    FINDINGS:    The ventricles and cerebral sulci are age-appropriate.    There is no evidence of acute intracranial hemorrhage, mass effect or midline shift.    Gray-white differentiation is maintained. There are patchy periventricular and subcortical white matter hypodensities that are nonspecific but typically attributed to chronic small vessel ischemic change.    There is no fracture to the calvarium. Mastoid air cells are well aerated bilaterally. Mucosal thickening involving the left sphenoid sinus, and to a lesser extent the right sphenoid sinus. Otherwise, the visualized portions of the sinuses are unremarkable.    IMPRESSION:    No CT evidence of acute intracranial pathology.        Assessment: 69 yo F with PMH of CAD s/p CABG (2019), HFrEF (EF 20%), AS, ESRD (MWF), bladder prolapse requiring chronic Castellon, Diabetes Type 2 on insulin presents from home s/p mechanical fall. Trauma workup in ED significant for R humeral fracture / R hip fracture / non displaced odontoid fracture/ acute rib fractures/ ? T6 vertebral fracture. Experienced cardiac arrest while in ED, exact down time unknown as pt was not on monitor however ROSC achieved 2 minutes after initiation of CPR. Pt subsequently intubated and sedated, started on pressors. Experienced seizure-like episode while sedation being weaned for possible extubation, consisting of R gaze deviation and clonic jerking of extremities which lasted 5 minutes, stopped after being given 2mg Versed. Experienced one additional episode lasting 1 minute per family, resolved without medication. Initial CTH on arrival negative for acute intracranial pathology, repeat CTH this evening after seizures did not show any changes from prior.    Plan:  #Neuro:  - Neurochecks q2h  - vEEG tomorrow  - Load with 500mg Keppra now, followed by 375mg BID with additional 250mg dose after dialysis  - Seizure precautions    #CV:  - On levophed    #Resp:  - Ventilator management as per primary team  - HOB > 35 degrees  - Aspiration precautions  - Keep SaO2 > 95%    #Renal/Fluid/Electolytes:  - Nephrology following  - Monitor electrolytes, keep Mg >2    #GI:  - Per primary    #Heme/Onc:  - SCDs/heparin    #ID:  - Keep normothermic  - Abx per primary      KEHINDE Devine  Please feel free to contact for any questions or concerns. Thank you.  Extension: #[phone]

## 2021-04-01 NOTE — CONSULT NOTE ADULT - ASSESSMENT
71 y/o F with h/o CAD s/p CABG, ICM, chronic systolic HF, ESRD on HD admitted after fall at home (?syncope), while in the ED, patient went into cardiac arrest. She is currently intubated, sedated, on pressor support.       Syncope/ Cardiac arrest / acute on chronic systolic HF /ICM/ ESRD on HD    Overloaded on exam   Fluid removal with HD - nephrology follow up  Continue pressor support - Wean as tolerated by BP   DC anti- HTN while on pressor support  Continue Abx coverage; follow up blood cultures   Telemetry monitoring   Neurosurgery follow up for concern for cervical Fx  Pain management for rib fracture per primary team   EP evaluation for ICD prior to discharge   Discussed with family at bedside and PCCM fellow   Will continue to follow

## 2021-04-01 NOTE — ED PROVIDER NOTE - OBJECTIVE STATEMENT
71 yo F with PMH CHF, DM, ESRD on HD MWF presenting after mechanical fall. Patient says she was at home and fell from standing after she tripped and landed on her right side. Denies LOC/syncope, seizure like activity, head trauma, AC. Patient only complains of non-radiating R shoulder pain and non-radiating R hip pain. Denies headache, vision changes, numbness, weakness, tingling, chest pain, shortness of breath, nausea, vomiting, diarrhea. Patient denies recent travel/sick contacts.

## 2021-04-01 NOTE — ED ADULT NURSE NOTE - OBJECTIVE STATEMENT
Pt reports GLF in kitchen following waking up from sleep. Pt brought in by EMS with leg board on right lower extremity. Pain to right lower extremity and hip

## 2021-04-01 NOTE — ED PROVIDER NOTE - ATTENDING CONTRIBUTION TO CARE
I personally evaluated the patient. I reviewed the Resident’s or Physician Assistant’s note (as assigned above), and agree with the findings and plan except as documented in my note.    71 yo F with PMH CHF, CABG, DM, recent buttock abscess I&D, ESRD on HD presents to the ED after mechanical fall. Patient says she was at home, tripped and fell on her r side. No head injury. No CP, SOB. No back pain. No HA. No n/v.  Denies being on AC. Son is bedside w list of medications and no AC appreciated on review of meds. Patient only complains of non-radiating R shoulder pain w TTP to the area and non-radiating R hip pain w pain on slight ROM. Abd is soft. States her BP always runs in the SBP 90's- prior inpatient charts are consistent w this.       CONSTITUTIONAL: Well-developed; well-nourished; in no acute distress. Providing history. Well appearing.   SKIN: skin exam is warm and dry, no acute rash.  HEAD: Normocephalic; atraumatic.  EYES: PERRL, 3 mm bilateral, no nystagmus, EOM intact; conjunctiva and sclera clear.  ENT: No nasal discharge; airway clear.  NECK: Supple; non tender.+ full passive ROM in all directions. No JVD  CARD: S1, S2 normal; no murmurs, gallops, or rubs. Regular rate and rhythm. + Symmetric Strong Pulses  RESP: No wheezes, rales or rhonchi. Good air movement bilaterally  ABD: soft; non-distended; non-tender. No Rebound, No Gaurding, No signs of peritnitis, No CVA tenderness  EXT: ttp over r shoulder and r hip. Puylses intact.   NEURO: CN 2-12 intact, no gross sensory or motor deficits, Alert, oriented, grossly unremarkable. No Focal deficits. GCS 15   PSYCH: Cooperative    Plan- xrays, labs, ortho consult, reassess

## 2021-04-01 NOTE — ED ADULT NURSE NOTE - PMH
Chronic indwelling Castellon catheter    Congestive heart failure (CHF)    Diabetes    End stage renal disease  on dialysis Mon, Wed, Fr  Pleural effusion due to congestive heart failure    Uterine prolapse

## 2021-04-01 NOTE — CONSULT NOTE ADULT - SUBJECTIVE AND OBJECTIVE BOX
Date of Admission: 4/1/21    HISTORY OF PRESENT ILLNESS:     71 yo F with PMH of CAD s/p CABG (November 2019), HFrEF (EF 20%), AS, ESRD (MWF), bladder prolapse requiring chronic Castellon, Diabetes Type 2 on insulin presents from home s/p mechanical fall. When the history was taken in the emergency department, she The denied any dizziness/ palpitations/ aura/vertigo. She reported non-radiating R shoulder pain and non-radiating R hip pain on arrival. In the emergency department, while the patient was receiving trauma work up she was found pulseless by a PCA in the room, had a cardiac arrest. ROSC was achieved after 2 minutes of CPR (1 epinephrine was given and 1 calcium chloride iv). Patient was intubated and sedated. EKG showed RBBB with wide complex tachycardia.   Per chart the patient had her last hemodialysis yesterday and has been relatively non compliant with her medications and medical care at home.   Of note in a previous call to the heart failure team, patient reported falling asleep for few seconds while doing the dishes couple of times.      PAST MEDICAL & SURGICAL HISTORY:    Diabetes  Congestive heart failure (CHF)  Pleural effusion due to congestive heart failure  End stage renal disease on dialysis Mon, Wed, Fr  Uterine prolapse  Chronic indwelling Castellon catheter  History of repair of hip fracture 2019  S/P CABG (coronary artery bypass graft) 11/2019  History of thoracentesis  Chronic indwelling Castellon catheter      FAMILY HISTORY:    Mother: MI and stomach cancer    SOCIAL HISTORY:      Former smoker      Allergies    No Known Allergies    Intolerances    	  REVIEW OF SYSTEMS:    Unable to obtain patient is sedated    PHYSICAL EXAM:    General Appearance: well appearing, normal for age and gender. 	  Neck: normal JVP, no bruit.   Cardiovascular: regular rate and rhythm S1 S2, No JVD, No murmurs, No edema  Respiratory: Lungs clear to auscultation	  Psychiatry: sedated  Gastrointestinal:  Soft, Non-tender  Skin/Integumen: No rashes, No ecchymoses, No cyanosis	  Neurologic: Non-focal  Musculoskeletal/ extremities: Normal range of motion, No clubbing, cyanosis or edema  Vascular: Peripheral pulses palpable 2+ bilaterally  	    PREVIOUS DIAGNOSTIC TESTING:      TTE 5/17/21  Summary:   1. Left ventricular ejection fraction, by visual estimation, is 20 to 25%.   2. Severely decreased global left ventricular systolic function.   3. The mitral valve leaflets are tethered due to reduced systolic function and elevated LVDP.   4. Moderate mitral annular calcification.   5. Moderate-to-severe mitral regurgitation.   6. Peak transaortic gradient equals 25.2 mmHg, mean transaortic gradient equals 8.8 mmHg, the calculated aortic valve area equals 1.07 cm² by the continuity equation consistent with moderate aortic stenosis (possible LFLG).   7. Estimated JENNIFER = 1.24 cm2 by planimetry.   8. Trivial aortic regurgitation.   9. Moderate tricuspid regurgitation.  10. Estimated pulmonary artery systolic pressure is 44.5 mmHg assuming a right atrial pressure of 8 mmHg, which is consistent with mild pulmonary hypertension.  11. LA volume Index is 71.0 ml/m² ml/m2.  12. Bilateral pleural effusions.      Home Medications:  aspirin 81 mg oral tablet, chewable: 1 tab(s) orally once a day (01 Apr 2021 11:06)  atorvastatin 40 mg oral tablet: 1 tab(s) orally once a day (01 Apr 2021 11:06)  INSULIN LISPRO KWIKPEN  100 UNIT/ML SOPN:  (01 Apr 2021 11:06)  METOLAZONE  5 MG TABS: 1  orally once a day (01 Apr 2021 11:06)  METOPROLOL SUCCINATE ER  25 MG TB24: 1  orally 2 times a day (01 Apr 2021 11:06)  SERTRALINE HCL  50 MG TABS:  (01 Apr 2021 11:06)  SEVELAMER CARBONATE 800MG TABLETS: TK 2 TS PO TID WITH MEALS (01 Apr 2021 11:06)  TORSEMIDE 20MG TABLETS: TK 3 TS PO BID (01 Apr 2021 11:06)  VITAMIN D2 18127HH (ERGO) CAP RX: TK 1 C PO WEEKLY (01 Apr 2021 11:06)

## 2021-04-01 NOTE — H&P ADULT - ATTENDING COMMENTS
patient seen and examined, agree with above, sp fall pan CT reviewed, ?? CP arrest ( after awake follows commands) ro seizure, cardiac event, EEG, propofol, orth eval, CE, hd, t collar very poor prognosis

## 2021-04-01 NOTE — H&P ADULT - NSICDXPASTSURGICALHX_GEN_ALL_CORE_FT
PAST SURGICAL HISTORY:  Chronic indwelling Castellon catheter     History of repair of hip fracture 2019    History of thoracentesis     S/P CABG (coronary artery bypass graft) 11/2019

## 2021-04-01 NOTE — CHART NOTE - NSCHARTNOTEFT_GEN_A_CORE
Patient was clinically well, decision to attempt weaning and hopefully extubate was taken.  During weaning trial, patient on 8/5 PSV, patient acutely became unresponsive, staring to the upper right, and then clonic movements of her left arm and both legs.   Event lasted for 5 minutes, aborted weaning immediately and gave 2 mg of Versed for sedation / attempt to abort seizure.   10 minutes later, while patient sedated, intubated and ventilated develops hypotension (30/20 on the radial arterial line), femoral pulses palpable but faint.   Bedside echo > Low LVEF (similar to what was reported on previous echo report)  Patient already on low dose levophed, increased it but to no avail. Gave 60mcg IV push of phenylephrine with favorable response.  Patient titrated down on the levophed, started on propofol for sedation, send full labs and ct-brain pending.   Consult neurology pending.

## 2021-04-01 NOTE — CONSULT NOTE ADULT - SUBJECTIVE AND OBJECTIVE BOX
ORTHOPAEDIC SURGERY CONSULT NOTE    Reason for Consult: Right hip fracture, right proximal humerus fracture    HPI: 70F with extensive PMHx below presents after ground level fall onto right side earlier in the night. She coded in the emergency room and was intubated and sedated. Orthopaedics consulted after intubation and sedation and no history is able to be provided by patient. Imaging showed right intertrochanteric fracture and right proximal humerus fracture.    Of note, per chart/XR review, patient had contralateral left intertrochanteric fracture in Aug 2019 treated here at [facility] with intramedullary implant.    PAST MEDICAL & SURGICAL HISTORY:  Diabetes  Congestive heart failure (CHF)  Pleural effusion due to congestive heart failure  End stage renal disease on dialysis Mon, Wed, Fr  Uterine prolapse  Chronic indwelling Castellon catheter  History of repair of hip fracture 2019  S/P CABG (coronary artery bypass graft) 11/2019  History of thoracentesis  Chronic indwelling Castellon catheter    Allergies: No Known Allergies    PHYSICAL EXAM:  Vital Signs Last 24 Hrs  T(C): 35.4 (01 Apr 2021 01:27), Max: 35.4 (01 Apr 2021 01:27)  T(F): 95.8 (01 Apr 2021 01:27), Max: 95.8 (01 Apr 2021 01:27)  HR: 75 (01 Apr 2021 04:30) (70 - 99)  BP: 86/55 (01 Apr 2021 04:30) (86/55 - 96/55)  RR: 20 (01 Apr 2021 04:30) (20 - 20)  SpO2: 100% (01 Apr 2021 04:30) (97% - 100%)    Physical Exam:  Intubated, sedated    RUE:  No open skin or wounds  No ecchymosis  No skin tenting or dimpling  Unable to assess motor or sensation  2+ radial pulse    RLE:  No open skin or wounds  No ecchymosis  No skin tenting or dimpling  Unable to assess motor or sensation  2+ DP pulse    Labs:                  9.9    8.48  )-----------( 211      ( 01 Apr 2021 03:19 )             32.4     139  |  100  |  30<H>  ----------------------------<  117<H>  3.8   |  23  |  3.2<H>    Ca    8.9      01 Apr 2021 02:50  TPro  6.8  /  Alb  4.2  /  TBili  0.3  /  DBili  x   /  AST  17  /  ALT  10  /  AlkPhos  124<H>  04-01  PT/INR - ( 01 Apr 2021 03:19 )   PT: 16.20 sec;   INR: 1.41 ratio    PTT - ( 01 Apr 2021 03:19 )  PTT:132.5 sec    Imaging: Right femur introchanteric fracture, right proximal humerus fracture.    A/P: 70F with right femur intertrochanteric fracture, right proximal humerus fracture. Currently intubated and sedated following code in the ER.    - Ortho will continue to monitor patient's overall status, with plan for surgical intervention dependent on patient's status/improvement, and discussion with family/NOK and/or patient if able  - Pain control PRN  - NWB RLE and RUE  - Apply arm sling to RUE for comfort

## 2021-04-01 NOTE — CONSULT NOTE ADULT - SUBJECTIVE AND OBJECTIVE BOX
Outpt cardiologist:    HPI:  69 yo F    presents sp mech fall (no syncope).          PAST MEDICAL & SURGICAL HISTORY  Diabetes    Congestive heart failure (CHF)    Pleural effusion due to congestive heart failure    End stage renal disease  on dialysis Mon, Wed, Fr    Uterine prolapse    Chronic indwelling Castellon catheter    History of repair of hip fracture  2019    S/P CABG (coronary artery bypass graft)  11/2019    History of thoracentesis    Chronic indwelling Castellon catheter        FAMILY HISTORY:  FAMILY HISTORY:  FH: stomach cancer (Mother)    FH: myocardial infarction (Mother)        SOCIAL HISTORY:  Social History:      ALLERGIES:  No Known Allergies      MEDICATIONS:  cefepime   IVPB 2000 milliGRAM(s) IV Intermittent once  rocuronium Injectable 100 milliGRAM(s) IV Push Once    PRN:      HOME MEDICATIONS:  Home Medications:  aspirin 81 mg oral tablet, chewable: 1 tab(s) orally once a day (03 Mar 2021 15:47)  atorvastatin 20 mg oral tablet: 1 tab(s) orally once a day (04 Mar 2021 12:14)  INSULIN LISPRO KWIKPEN  100 UNIT/ML SOPN:  (03 Mar 2021 15:47)  METOLAZONE  5 MG TABS: 1  orally once a day (03 Mar 2021 15:47)  METOPROLOL SUCCINATE ER  25 MG TB24: 1  orally 2 times a day (03 Mar 2021 15:47)  SERTRALINE HCL  50 MG TABS:  (03 Mar 2021 15:47)  SEVELAMER CARBONATE 800MG TABLETS: TK 2 TS PO TID WITH MEALS (03 Mar 2021 15:47)  TORSEMIDE 20MG TABLETS: TK 3 TS PO BID (03 Mar 2021 15:47)  VITAMIN D2 03862KQ (ERGO) CAP RX: TK 1 C PO WEEKLY (03 Mar 2021 15:47)      VITALS:   T(F): 95.8 (04-01 @ 01:27), Max: 95.8 (04-01 @ 01:27)  HR: 99 (04-01 @ 03:30) (70 - 99)  BP: 96/55 (04-01 @ 01:27) (96/55 - 96/55)  BP(mean): --  RR: 20 (04-01 @ 01:27) (20 - 20)  SpO2: 97% (04-01 @ 03:30) (97% - 97%)    I&O's Summary      REVIEW OF SYSTEMS:  CONSTITUTIONAL: No weakness, fevers or chills  HEENT: No visual changes, neck/ear pain  RESPIRATORY: No cough, sob  CARDIOVASCULAR: See HPI  GASTROINTESTINAL: No abdominal pain. No nausea, vomiting, diarrhea   GENITOURINARY: No dysuria, frequency or hematuria  NEUROLOGICAL: No new focal deficits  SKIN: No new rashes    PHYSICAL EXAM:  General: Not in distress.  Non-toxic appearing.   HEENT: EOMI  Cardio: regular, S1, S2, no murmur  Pulm: B/L BS.  No wheezing / crackles / rales  Abdomen: Soft, non-tender, non-distended. Normoactive bowel sounds  Extremities: No edema b/l le  Neuro: A&O x3. No focal deficits    LABS:                        9.1    4.15  )-----------( 160      ( 01 Apr 2021 02:50 )             29.5     04-01    139  |  100  |  30<H>  ----------------------------<  117<H>  3.8   |  23  |  3.2<H>    Ca    8.9      01 Apr 2021 02:50    TPro  6.8  /  Alb  4.2  /  TBili  0.3  /  DBili  x   /  AST  17  /  ALT  10  /  AlkPhos  124<H>  04-01    PT/INR - ( 01 Apr 2021 02:50 )   PT: 13.10 sec;   INR: 1.14 ratio         PTT - ( 01 Apr 2021 02:50 )  PTT:33.6 sec  Lactate, Blood: 1.6 mmol/L (04-01-21 @ 02:50)          Troponin trend:        COVID-19 PCR: NotDetec (09 Mar 2021 11:17)  SARS-CoV-2: NotDetec (02 Mar 2021 15:40)      RADIOLOGY:  -CXR:  -TTE:  -CCTA:  -STRESS TEST:  -CATHETERIZATION:  -OTHER:  ECG:      TELEMETRY EVENTS:   Outpt cardiologist:  Dr. Mancia?     HPI:  69 yo F    PMH ESRD ON HD,  CHF, DM, presents sp mech fall.     presents sp mech fall (no syncope).     Pt was getting trauma work up / xrays and scans - was conversive with staff prior to scans.    After coming back from her xrays,  pt found to be unresponsive,  pulseless.  ROSC s/p ~2 min of CPR w one dose epinephrine and calcium chloride IV.    post rosc EKG showing RBBB pattern EKG, wide complex tachycardia.          PAST MEDICAL & SURGICAL HISTORY  Diabetes    Congestive heart failure (CHF)    Pleural effusion due to congestive heart failure    End stage renal disease  on dialysis Mon, Wed, Fr    Uterine prolapse    Chronic indwelling Castellon catheter    History of repair of hip fracture  2019    S/P CABG (coronary artery bypass graft)  11/2019    History of thoracentesis    Chronic indwelling Castellon catheter        FAMILY HISTORY:  FAMILY HISTORY:  FH: stomach cancer (Mother)    FH: myocardial infarction (Mother)        SOCIAL HISTORY:  Social History:      ALLERGIES:  No Known Allergies      MEDICATIONS:  cefepime   IVPB 2000 milliGRAM(s) IV Intermittent once  rocuronium Injectable 100 milliGRAM(s) IV Push Once    PRN:      HOME MEDICATIONS:  Home Medications:  aspirin 81 mg oral tablet, chewable: 1 tab(s) orally once a day (03 Mar 2021 15:47)  atorvastatin 20 mg oral tablet: 1 tab(s) orally once a day (04 Mar 2021 12:14)  INSULIN LISPRO KWIKPEN  100 UNIT/ML SOPN:  (03 Mar 2021 15:47)  METOLAZONE  5 MG TABS: 1  orally once a day (03 Mar 2021 15:47)  METOPROLOL SUCCINATE ER  25 MG TB24: 1  orally 2 times a day (03 Mar 2021 15:47)  SERTRALINE HCL  50 MG TABS:  (03 Mar 2021 15:47)  SEVELAMER CARBONATE 800MG TABLETS: TK 2 TS PO TID WITH MEALS (03 Mar 2021 15:47)  TORSEMIDE 20MG TABLETS: TK 3 TS PO BID (03 Mar 2021 15:47)  VITAMIN D2 37516FT (ERGO) CAP RX: TK 1 C PO WEEKLY (03 Mar 2021 15:47)      VITALS:   T(F): 95.8 (04-01 @ 01:27), Max: 95.8 (04-01 @ 01:27)  HR: 99 (04-01 @ 03:30) (70 - 99)  BP: 96/55 (04-01 @ 01:27) (96/55 - 96/55)  BP(mean): --  RR: 20 (04-01 @ 01:27) (20 - 20)  SpO2: 97% (04-01 @ 03:30) (97% - 97%)    I&O's Summary      REVIEW OF SYSTEMS:  CONSTITUTIONAL: No weakness, fevers or chills  HEENT: No visual changes, neck/ear pain  RESPIRATORY: No cough, sob  CARDIOVASCULAR: See HPI  GASTROINTESTINAL: No abdominal pain. No nausea, vomiting, diarrhea   GENITOURINARY: No dysuria, frequency or hematuria  NEUROLOGICAL: No new focal deficits  SKIN: No new rashes    PHYSICAL EXAM:  General: Not in distress.  Non-toxic appearing.   HEENT: EOMI  Cardio: regular, S1, S2, no murmur  Pulm: B/L BS.  No wheezing / crackles / rales  Abdomen: Soft, non-tender, non-distended. Normoactive bowel sounds  Extremities: No edema b/l le  Neuro: A&O x3. No focal deficits    LABS:                        9.1    4.15  )-----------( 160      ( 01 Apr 2021 02:50 )             29.5     04-01    139  |  100  |  30<H>  ----------------------------<  117<H>  3.8   |  23  |  3.2<H>    Ca    8.9      01 Apr 2021 02:50    TPro  6.8  /  Alb  4.2  /  TBili  0.3  /  DBili  x   /  AST  17  /  ALT  10  /  AlkPhos  124<H>  04-01    PT/INR - ( 01 Apr 2021 02:50 )   PT: 13.10 sec;   INR: 1.14 ratio         PTT - ( 01 Apr 2021 02:50 )  PTT:33.6 sec  Lactate, Blood: 1.6 mmol/L (04-01-21 @ 02:50)          Troponin trend:        COVID-19 PCR: NotDetec (09 Mar 2021 11:17)  SARS-CoV-2: NotDetec (02 Mar 2021 15:40)      RADIOLOGY:  -CXR:  -TTE:  -CCTA:  -STRESS TEST:  -CATHETERIZATION:  -OTHER:  ECG:      TELEMETRY EVENTS:   Outpt cardiologist:  Dr. Mancia    HPI:  69 yo F    PMH ESRD ON HD,  CHF, DM, presents sp mech fall.     presents sp mech fall (no syncope).   Pt still adamant that she did not lose consciousness / pass out,  and family member states when he found her on floor she was conversive.  Pt describes the fall as a 'slip and fall' or a 'turn / slip / fall',  however pt's family has doubts and concerns about something more than just slipping.  Family states she can sometimes seem to fall asleep while at the sink and fall from that.      Pt was getting trauma work up / xrays and scans - was conversive with staff prior to scans.    After coming back from her xrays,  pt found to be unresponsive,  pulseless.  ROSC s/p ~2 min of CPR w one dose epinephrine and calcium chloride IV.  Pt was not on monitor during that time,  so unsure what kind of rhythm pt might have been in at that time.      post rosc EKG showing RBBB pattern EKG, wide complex tachycardia.          PAST MEDICAL & SURGICAL HISTORY  Diabetes    Congestive heart failure (CHF)    Pleural effusion due to congestive heart failure    End stage renal disease  on dialysis Mon, Wed, Fr    Uterine prolapse    Chronic indwelling Castellon catheter    History of repair of hip fracture  2019    S/P CABG (coronary artery bypass graft)  11/2019    History of thoracentesis    Chronic indwelling Castellon catheter        FAMILY HISTORY:  FAMILY HISTORY:  FH: stomach cancer (Mother)    FH: myocardial infarction (Mother)        SOCIAL HISTORY:  Social History:      ALLERGIES:  No Known Allergies      MEDICATIONS:  cefepime   IVPB 2000 milliGRAM(s) IV Intermittent once  rocuronium Injectable 100 milliGRAM(s) IV Push Once    PRN:      HOME MEDICATIONS:  Home Medications:  aspirin 81 mg oral tablet, chewable: 1 tab(s) orally once a day (03 Mar 2021 15:47)  atorvastatin 20 mg oral tablet: 1 tab(s) orally once a day (04 Mar 2021 12:14)  INSULIN LISPRO KWIKPEN  100 UNIT/ML SOPN:  (03 Mar 2021 15:47)  METOLAZONE  5 MG TABS: 1  orally once a day (03 Mar 2021 15:47)  METOPROLOL SUCCINATE ER  25 MG TB24: 1  orally 2 times a day (03 Mar 2021 15:47)  SERTRALINE HCL  50 MG TABS:  (03 Mar 2021 15:47)  SEVELAMER CARBONATE 800MG TABLETS: TK 2 TS PO TID WITH MEALS (03 Mar 2021 15:47)  TORSEMIDE 20MG TABLETS: TK 3 TS PO BID (03 Mar 2021 15:47)  VITAMIN D2 92144XV (ERGO) CAP RX: TK 1 C PO WEEKLY (03 Mar 2021 15:47)      VITALS:   T(F): 95.8 (04-01 @ 01:27), Max: 95.8 (04-01 @ 01:27)  HR: 99 (04-01 @ 03:30) (70 - 99)  BP: 96/55 (04-01 @ 01:27) (96/55 - 96/55)  BP(mean): --  RR: 20 (04-01 @ 01:27) (20 - 20)  SpO2: 97% (04-01 @ 03:30) (97% - 97%)    I&O's Summary      REVIEW OF SYSTEMS:  CONSTITUTIONAL: No weakness, fevers or chills  HEENT: No visual changes, neck/ear pain  RESPIRATORY: No cough, sob  CARDIOVASCULAR: See HPI  GASTROINTESTINAL: No abdominal pain. No nausea, vomiting, diarrhea   GENITOURINARY: No dysuria, frequency or hematuria  NEUROLOGICAL: No new focal deficits  SKIN: No new rashes    PHYSICAL EXAM:  General: Not in distress.  Non-toxic appearing.   HEENT: EOMI  Cardio: regular, S1, S2, no murmur  Pulm: B/L BS.  No wheezing / crackles / rales  Abdomen: Soft, non-tender, non-distended. Normoactive bowel sounds  Extremities: No edema b/l le  Neuro: A&O x3. No focal deficits    LABS:                        9.1    4.15  )-----------( 160      ( 01 Apr 2021 02:50 )             29.5     04-01    139  |  100  |  30<H>  ----------------------------<  117<H>  3.8   |  23  |  3.2<H>    Ca    8.9      01 Apr 2021 02:50    TPro  6.8  /  Alb  4.2  /  TBili  0.3  /  DBili  x   /  AST  17  /  ALT  10  /  AlkPhos  124<H>  04-01    PT/INR - ( 01 Apr 2021 02:50 )   PT: 13.10 sec;   INR: 1.14 ratio         PTT - ( 01 Apr 2021 02:50 )  PTT:33.6 sec  Lactate, Blood: 1.6 mmol/L (04-01-21 @ 02:50)          Troponin trend:        COVID-19 PCR: NotDetec (09 Mar 2021 11:17)  SARS-CoV-2: NotDetec (02 Mar 2021 15:40)      RADIOLOGY:  -CXR:  -TTE:  -CCTA:  -STRESS TEST:  -CATHETERIZATION:  -OTHER:  ECG:      TELEMETRY EVENTS:

## 2021-04-01 NOTE — ED PROCEDURE NOTE - CPROC ED POST PROC CARE GUIDE1
Verbal/written post procedure instructions were given to patient/caregiver./Instructed patient/caregiver to follow-up with primary care physician./Instructed patient/caregiver regarding signs and symptoms of infection./Keep the cast/splint/dressing clean and dry.
Verbal/written post procedure instructions were given to patient/caregiver./Instructed patient/caregiver to follow-up with primary care physician./Instructed patient/caregiver regarding signs and symptoms of infection./Keep the cast/splint/dressing clean and dry.
Verbal/written post procedure instructions were given to patient/caregiver.

## 2021-04-01 NOTE — ED PROVIDER NOTE - CARE PLAN
Principal Discharge DX:	Cardiopulmonary arrest  Secondary Diagnosis:	Hip fracture  Secondary Diagnosis:	Humerus fracture  Secondary Diagnosis:	Urinary tract infection   Principal Discharge DX:	Cardiopulmonary arrest  Secondary Diagnosis:	Hip fracture  Secondary Diagnosis:	Humerus fracture  Secondary Diagnosis:	Urinary tract infection  Secondary Diagnosis:	Odontoid fracture  Secondary Diagnosis:	Pulmonary edema

## 2021-04-01 NOTE — ED PROCEDURE NOTE - CPROC ED DIRECTIONAL
HPI:   In [id number] patient was diagnosed to have recurrent right breast cancer in the right supraclavicular area and she had radiation and she took Femara for 10 years that she finished in 2015  Workup for metastatic disease in 2015 was negative and since then she has been under surveillance  She was having some pain in the left shoulder area where x-rays showed degenerative changes  She has been taking NSAIDs and will be starting physical therapy  Patient's PCP has been managing that  Patient states there is improvement in pain and functionality at left shoulder  Current Outpatient Prescriptions:     aspirin (ASPIRIN EC ADULT LOW STRENGTH) 81 mg EC tablet, Take 1 tablet by mouth daily, Disp: , Rfl:     Cholecalciferol (VITAMIN D3) 2000 units TABS, Take 2,000 Units by mouth daily, Disp: , Rfl:     liraglutide (VICTOZA) injection, Inject 0 6 mg under the skin, Disp: , Rfl:     lisinopril (ZESTRIL) 10 mg tablet, Take 10 mg by mouth, Disp: , Rfl:     LORazepam (ATIVAN) 1 mg tablet, Take 2 tablets of 1 mg Ativan at night, Disp: 90 tablet, Rfl: 3    metFORMIN (GLUMETZA) 1000 MG (MOD) 24 hr tablet, Take 1,000 mg by mouth, Disp: , Rfl:     MULTIPLE VITAMINS PO, Take by mouth, Disp: , Rfl:     Omega-3 Fatty Acids (FISH OIL PO), Take 2 g by mouth, Disp: , Rfl:     pravastatin (PRAVACHOL) 40 mg tablet, , Disp: , Rfl:     No Known Allergies     No history exists  ROS:  10/02/18 Reviewed 13 systems:  Presently no headaches, seizures, dizziness, diplopia, dysphagia, hoarseness, chest pain, palpitations, shortness of breath, cough, hemoptysis, abdominal pain, nausea, vomiting, change in bowel habits, melena, hematuria, fever, chills, bleeding, bone pains, skin rash, weight loss,  tiredness ,  weakness, numbness,  claudication and gait problem  No frequent infections  Not unusually sensitive to heat or cold  No swelling of the ankles  No swollen glands  Patient is anxious    No GYN symptoms Other
symptoms are in HPI        /78 (BP Location: Left arm, Patient Position: Sitting, Cuff Size: Adult)   Pulse 73   Temp 98 1 °F (36 7 °C)   Resp 18   Ht 5' 4" (1 626 m)   Wt 65 9 kg (145 lb 4 8 oz)   SpO2 97%   BMI 24 94 kg/m²     Physical Exam:  Alert, oriented, not in distress, no icterus, no oral thrush, no palpable neck mass, clear lung fields, regular heart rate, abdomen  soft and non tender, no palpable abdominal mass, no ascites, no edema of ankles, no calf tenderness, no focal neurological deficit, no skin rash, no palpable lymphadenopathy, good arterial pulses, no clubbing  Patient is anxious  Performance status 1  Reconstructed surgery right breast   Also plastic surgery on left breast   No lymphedema  IMAGING:   Degenerative changes left shoulder    LABS:  Normal blood counts  Normal chemistry except for alkaline   Phosphatase 157 and blood sugar 104  Normal tumor marker CA 15-3  Normal TSH  Reviewed and discussed with patient  Assessment and plan: In [id number] patient was diagnosed to have recurrent right breast cancer in the right supraclavicular area and she had radiation and she took Femara for 10 years that she finished in 2015  Workup for metastatic disease in 2015 was negative and since then she has been under surveillance  She was having some pain in the left shoulder area where x-rays showed degenerative changes  She has been taking NSAIDs and will be starting physical therapy  Patient's PCP has been managing that  Patient states there is improvement in pain and functionality at left shoulder     Physical examination and test results are as recorded and discussed  Breast cancer remains in remission  Goal is cure from breast cancer  Condition discussed and explained  Questions answered  She gets mammography annually    Discussed the importance of self-breast examination, eating healthy foods, staying active and health screening test   She wants to come back in
1 year  She knows that if there is a problem related to our speciality to please call our office and come sooner  She is self-care  For elevated alkaline phosphatase she will go for isoenzyme test this month and call to discuss results  1  History of breast cancer    - Cancer antigen 15-3; Future  - CBC and differential; Future  - Comprehensive metabolic panel; Future    2  Elevated alkaline phosphatase level    - Alkaline phosphatase, isoenzymes; Future        Patient voiced understanding and agreement in the discussion  Counseling / Coordination of Care   Greater than 50% of total time was spent with the patient and / or family counseling and / or coordination of care 
left
left

## 2021-04-01 NOTE — ED PROVIDER NOTE - CLINICAL SUMMARY MEDICAL DECISION MAKING FREE TEXT BOX
Pt s/p cardiac arrest w ROSC. Intubated and sedated. STEMI code activated. A line and central line placed. ICU and cardiology consulted. Pt admitted to ICU

## 2021-04-01 NOTE — ED PROVIDER NOTE - NS ED ROS FT
Constitutional:  (-) fever, (-) chills, (-) lethargy  Eyes:  (-) eye pain (-) visual changes  ENMT: (-) nasal discharge, (-) sore throat. (-) neck pain or stiffness  Cardiac: (-) chest pain (-) palpitations  Respiratory:  (-) cough (-) respiratory distress.   GI:  (-) nausea (-) vomiting (-) diarrhea (-) abdominal pain.  :  (-) dysuria (-) frequency (-) burning.  MS:  (-) back pain (+) joint pain, R shoulder & R femur   Neuro:  (-) headache (-) numbness (-) tingling (-) focal weakness  Skin:  (-) rash  Except as documented in the HPI,  all other systems are negative

## 2021-04-01 NOTE — CONSULT NOTE ADULT - SUBJECTIVE AND OBJECTIVE BOX
[facility] INITIAL CONSULT NOTE  --------------------------------------------------------------------------------  HPI:  71 yo F with PMH of CAD s/p CABG (November 2019), HFrEF (EF 20%), AS, ESRD on HD at [facility] ([facility]), bladder prolapse requiring chronic Castellon, Diabetes Type 2 on insulin presents from home s/p fall. When the history was taken in the emergency department, she denied any dizziness/ palpitations/ aura/vertigo. She reported non-radiating R shoulder pain and non-radiating R hip pain on arrival. She was found to have R humeral fracture / R hip fracture / non displaced odontoid fracture/ acute rib fractures/ ? T6 vertebral fracture.   In the emergency department, while the patient was receiving trauma work up she was found pulseless by a PCA in the room ( she was not on the monitor at that time) and had a cardiac arrest. ROSC was achieved after 2 minutes of CPR (1 epinephrine was given and 1 calcium chloride iv). Patient was intubated and sedated. EKG showed RBBB with wide complex tachycardia. Per family at the bedside, the patient had her last hemodialysis yesterday and has been relatively non compliant with her medications and medical care at home. Unable to obtain ros as patient is sedated.     PAST HISTORY  --------------------------------------------------------------------------------  PAST MEDICAL & SURGICAL HISTORY:  Diabetes  Congestive heart failure (CHF)  Pleural effusion due to congestive heart failure  End stage renal disease  on dialysis Mon, Wed, Fr  Uterine prolapse  Chronic indwelling Castellon catheter  History of repair of hip fracture 2019  S/P CABG (coronary artery bypass graft) 11/2019  History of thoracentesis  Chronic indwelling Castellon catheter    FAMILY HISTORY:  FH: stomach cancer (Mother)  FH: myocardial infarction (Mother)    SOCIAL HISTORY:  No current ETOH, tobacco, and illicit drug use    ALLERGIES & MEDICATIONS  --------------------------------------------------------------------------------  Allergies    No Known Allergies    Standing Inpatient Medications  aMIOdarone    Tablet 200 milliGRAM(s) Oral daily  aspirin  chewable 81 milliGRAM(s) Oral daily  atorvastatin Oral Tab/Cap - Peds 40 milliGRAM(s) Oral daily  cefepime   IVPB 2000 milliGRAM(s) IV Intermittent once  chlorhexidine 0.12% Liquid 15 milliLiter(s) Oral Mucosa every 12 hours  chlorhexidine 4% Liquid 1 Application(s) Topical <User Schedule>  dexMEDEtomidine Infusion 0.2 MICROgram(s)/kG/Hr IV Continuous <Continuous>  fentaNYL   Infusion. 0.5 MICROgram(s)/kG/Hr IV Continuous <Continuous>  heparin SubCutaneous Injection - Peds 5000 Unit(s) SubCutaneous every 12 hours  hydrALAZINE 25 milliGRAM(s) Oral three times a day  meropenem  IVPB      meropenem  IVPB 500 milliGRAM(s) IV Intermittent every 12 hours  metolazone Oral Tab/Cap - Peds 5 milliGRAM(s) Oral daily  norepinephrine Infusion 0.05 MICROgram(s)/kG/Min IV Continuous <Continuous>  pantoprazole  Injectable 40 milliGRAM(s) IV Push daily  senna 2 Tablet(s) Oral daily  sevelamer carbonate 800 milliGRAM(s) Oral three times a day  torsemide 80 milliGRAM(s) Oral two times a day    REVIEW OF SYSTEMS  --------------------------------------------------------------------------------  Unable to obtain    VITALS/PHYSICAL EXAM  --------------------------------------------------------------------------------  T(C): 35.9 (04-01-21 @ 15:15), Max: 35.9 (04-01-21 @ 15:15)  HR: 60 (04-01-21 @ 16:50) (60 - 99)  BP: 158/67 (04-01-21 @ 06:36) (86/55 - 158/67)  RR: 20 (04-01-21 @ 16:50) (18 - 20)  SpO2: 100% (04-01-21 @ 16:50) (95% - 100%)  Height (cm): 160 (04-01-21 @ 01:27)  Weight (kg): 66 (04-01-21 @ 09:10)  BMI (kg/m2): 25.8 (04-01-21 @ 09:10)  BSA (m2): 1.69 (04-01-21 @ 09:10)    04-01-21 @ 07:01  -  04-01-21 @ 17:25  --------------------------------------------------------  IN: 150 mL / OUT: 0 mL / NET: 150 mL    Physical Exam:  	Gen: Intubated  	Pulm: CTA B/L  	CV: RRR, S1S2  	Back: No sacral edema  	Abd: +BS, soft, mild distension  	: Ravinder  	LE: Warm, no edema  	Neuro: Sedated  	Vascular access: [facility]    LABS/STUDIES  --------------------------------------------------------------------------------              8.2    5.76  >-----------<  133      [04-01-21 @ 08:56]              26.0     138  |  100  |  36  ----------------------------<  116      [04-01-21 @ 08:56]  3.7   |  24  |  3.6        Ca     9.2     [04-01-21 @ 08:56]      Mg     2.1     [04-01-21 @ 08:56]    TPro  5.8  /  Alb  3.5  /  TBili  0.3  /  DBili  x   /  AST  73  /  ALT  43  /  AlkPhos  152  [04-01-21 @ 08:56]    PT/INR: PT 14.30, INR 1.24       [04-01-21 @ 08:56]  PTT: 32.4       [04-01-21 @ 08:56]    Troponin 0.20      [04-01-21 @ 08:56]    Creatinine Trend:  SCr 3.6 [04-01 @ 08:56]  SCr 3.5 [04-01 @ 03:19]  SCr 3.2 [04-01 @ 02:50]  SCr 4.1 [03-07 @ 11:03]  SCr 4.6 [03-05 @ 07:06]    Urinalysis - [04-01-21 @ 02:50]      Color Light Yellow / Appearance Clear / SG 1.011 / pH 6.0      Gluc Negative / Ketone Negative  / Bili Negative / Urobili <2 mg/dL       Blood Negative / Protein Negative / Leuk Est Moderate / Nitrite Negative      RBC 2 / WBC 8 / Hyaline 0 / Gran  / Sq Epi  / Non Sq Epi 1 / Bacteria Many    Iron 55, TIBC 167, %sat 33      [05-27-20 @ 13:22]  Ferritin 636      [05-27-20 @ 13:22]  HbA1c 5.7      [11-09-19 @ 01:20]    HBsAb Nonreact      [05-19-20 @ 14:58]  HBsAg Nonreact      [05-19-20 @ 14:58]  HCV 0.33, Nonreact      [05-19-20 @ 14:58]    Free Light Chains: kappa 6.76, lambda 3.58, ratio = 1.89      [05-19 @ 04:50]  Immunofixation Serum:   No Monoclonal Band Identified    Reference Range: None Detected      [05-19-20 @ 04:50]  SPEP Interpretation: Hypogammaglobulinemia      [05-19-20 @ 04:50]

## 2021-04-01 NOTE — ED PROCEDURE NOTE - CPROC ED INDICATIONS1
emergency venous access/hemodynamic monitoring/hypertonic/irritant infusion/volume resuscitation
arterial puncture to obtain ABG's/critical patient/monitoring purposes
airway protection/critical patient/mental status change

## 2021-04-01 NOTE — CONSULT NOTE ADULT - ASSESSMENT
IMPRESSION  - CPA - found pulseless/unresponsive (wasn't on monitor at that time) - rosc within 1-2min CPR,  no MS post rosc.    - EKG showing RBBB,  s1q3t3 pattern  - Multiple acute fractures (official reads pending)  - Cardiac hx:  HFREF (last ef 5/'20:  20-25%.  mod AS (poss LFLG; peak 25, mean 8.8, fredo 1.07 by continuity,  1.24 by planimetry),  mod tr, mild p htn.  ),  CAD (3vd, LM/RCA/LCX/LAD dz;  s/p CABGX3 '19)  - Other hx:  COPD, DM      PLAN  - code stemi cancelled  - no plans for immediate cardiac cath at this time.   - CTA r/o PE  - TTE  - f/u official trauma w/u: anticipating multple acute fractures based on unofficial xray findings.   - without appreciable mental status recovery post rosc,  should be on TTM  IMPRESSION  - CPA - found pulseless/unresponsive (wasn't on monitor at that time) - rosc within 1-2min CPR,  no MS post rosc.    - EKG showing RBBB,  s1q3t3 pattern  - Multiple acute fractures (official reads pending)  - Cardiac hx:  HFREF (last ef 5/'20:  20-25%.  mod AS (poss LFLG; peak 25, mean 8.8, fredo 1.07 by continuity,  1.24 by planimetry),  mod tr, mild p htn.  ),  CAD (3vd, LM/RCA/LCX/LAD dz;  s/p CABGX3 '19)  - Other hx:  COPD, DM      PLAN  - pt still remembers her fall / states she did not lose consciousness and that it was a turn/slip/fall.   - however uncertain etiology of cpa during trauma w/u  - cannot rule out cardiac arrythmia as cause of cpa  - may benefit from monitor if no arrythmias picked up on tele  during her stay here  - cw home hf and cad medications.

## 2021-04-01 NOTE — CONSULT NOTE ADULT - ASSESSMENT
1. Odontoid fracture. Neurosurgery following.  2. R hip / R humerus fracture. Ortho following.  3. ESRD on HD MWF. Will plan for HD tomorrow if hemodynamically stable.  4. Anemia. EPO with HD.  5. Secondary hyperparathyroidism. Hectorol with HD.  6. Hyperphosphatemia. Start sevelamer 1600mg PO TID with meals once diet initiated.

## 2021-04-01 NOTE — H&P ADULT - ASSESSMENT
69 yo F with PMH of CAD s/p CABG (November 2019), HFrEF (EF 20%), AS, ESRD (MWF), bladder prolapse requiring chronic Castellon, Diabetes Type 2 on insulin presents from home s/p mechanical fall. When the history was taken in the emergency department, she The denied any dizziness/ palpitations/ aura/vertigo. She reported non-radiating R shoulder pain and non-radiating R hip pain on arrival. She was found to have R humeral fracture / R hip fracture / non displaced odontoid fracture/ acute rib fractures/ ? T6 vertebral fracture.     In the emergency department, while the patient was receiving trauma work up she was found pulseless by a PCA in the room ( she was not on the monitor at that time) and had a cardiac arrest. ROSC was achieved after 2 minutes of CPR (1 epinephrine was given and 1 calcium chloride iv). Patient was intubated and sedated. EKG showed RBBB with wide complex tachycardia. Per family at the bedside, the patient had her last hemodialysis yesterday and has been relatively non compliant with her medications and medical care at home. Unable to obtain ros as patient is sedated.       # Cardiac Arrest likely in setting of arrhythmia and CHF Exaccerbation   # h/o CAD s/p CABG (2019)  # h/o HFrEF 20%  # h/o ESRD    - EKG RBBB / wide complex tachycardia  - Troponin 0.2 , trend   - CTA negative for PE   - CXR : b/l pleural effusions, f/u official read  - d-dimer elevated ~ 4700 , CTA negative for PE   - c/w Torsemide , Metolazone , Hydralazine, Metoprolol, ASA , Statin  - ? no entresto , ESRD patient   - f/u Cardiology Dr. Gandara   - Dr. Mancia following     # Elevated d-dimer  - CTA negative for PE   - f/u LE duplex  - no need for full anticoagulation now     # Elevated Troponin , likely secondary to demand ischemia and ESRD  - RBBB with wide complex tachycardia on EKG   - trend troponin  and EKG    # Mild transaminitis post cardiac arrest , trending down, monitor  - if not improving , then d/c atorvastatin and obtain RUQ ultrasound     # Elevated lipase, no evidence of pancreatitis on CT A/P , likely in setting of ESRD and cardiac arrest  - physical exam unremarkable  - no need for IVF given above and pulmonary edema    # Cystitis   - will obtain urine culture given inability to obtain ros   - Meropenem   - f/u urine cultures    # h/o ESRD on HD MWF  - last HD yesterday  - f/u nephrology consult  - f/u phosphorus level and c/w home dose sevelamer       # h/o DM   - fingersticks tid  - hold home insulin  - insulin protocol if > 180    # DVT PPX: heparin subq  # GI PPX: PPI  # Diet: NPO   # Activity: bedrest  # Bowel regimen    71 yo F with PMH of CAD s/p CABG (November 2019), HFrEF (EF 20%), AS, ESRD (MWF), bladder prolapse requiring chronic Castellon, Diabetes Type 2 on insulin presents from home s/p mechanical fall. When the history was taken in the emergency department, she The denied any dizziness/ palpitations/ aura/vertigo. She reported non-radiating R shoulder pain and non-radiating R hip pain on arrival. She was found to have R humeral fracture / R hip fracture / non displaced odontoid fracture/ acute rib fractures/ ? T6 vertebral fracture.     In the emergency department, while the patient was receiving trauma work up she was found pulseless by a PCA in the room ( she was not on the monitor at that time) and had a cardiac arrest. ROSC was achieved after 2 minutes of CPR (1 epinephrine was given and 1 calcium chloride iv). Patient was intubated and sedated. EKG showed RBBB with wide complex tachycardia. Per family at the bedside, the patient had her last hemodialysis yesterday and has been relatively non compliant with her medications and medical care at home. Unable to obtain ros as patient is sedated.       # Cardiac Arrest likely in setting of arrhythmia and CHF Exaccerbation   # h/o CAD s/p CABG (2019)  # h/o HFrEF 20%  # h/o ESRD    - EKG RBBB / wide complex tachycardia  - Troponin 0.2 , trend   - CTA negative for PE   - CXR : b/l pleural effusions, f/u official read  - d-dimer elevated ~ 4700 , CTA negative for PE   - c/w Torsemide , Metolazone , Hydralazine, Metoprolol, ASA , Statin  - ? no entresto , ESRD patient   - f/u Cardiology Dr. Gandara   - Dr. Mancia following     # Right humerus and R femur fracture  - ortho following    # Odontoid fracture and ? T6 fracture   - f/u neurosurgery     # Elevated d-dimer  - CTA negative for PE   - f/u LE duplex  - no need for full anticoagulation now     # Elevated Troponin , likely secondary to demand ischemia and ESRD  - RBBB with wide complex tachycardia on EKG   - trend troponin  and EKG    # Mild transaminitis post cardiac arrest , trending down, monitor  - if not improving , then d/c atorvastatin and obtain RUQ ultrasound     # Elevated lipase, no evidence of pancreatitis on CT A/P , likely in setting of ESRD and cardiac arrest  - physical exam unremarkable  - no need for IVF given above and pulmonary edema    # Cystitis   - will obtain urine culture given inability to obtain ros   - Meropenem   - f/u urine cultures    # h/o ESRD on HD MWF  - last HD yesterday  - f/u nephrology consult  - f/u phosphorus level and c/w home dose sevelamer       # h/o DM   - fingersticks tid  - hold home insulin  - insulin protocol if > 180    # DVT PPX: heparin subq  # GI PPX: PPI  # Diet: NPO   # Activity: bedrest  # Bowel regimen    69 yo F with PMH of CAD s/p CABG (November 2019), HFrEF (EF 20%), AS, ESRD (MWF), bladder prolapse requiring chronic Castellon, Diabetes Type 2 on insulin presents from home s/p mechanical fall. When the history was taken in the emergency department, she The denied any dizziness/ palpitations/ aura/vertigo. She reported non-radiating R shoulder pain and non-radiating R hip pain on arrival. She was found to have R humeral fracture / R hip fracture / non displaced odontoid fracture/ acute rib fractures/ ? T6 vertebral fracture.     In the emergency department, while the patient was receiving trauma work up she was found pulseless by a PCA in the room ( she was not on the monitor at that time) and had a cardiac arrest. ROSC was achieved after 2 minutes of CPR (1 epinephrine was given and 1 calcium chloride iv). Patient was intubated and sedated. EKG showed RBBB with wide complex tachycardia. Per family at the bedside, the patient had her last hemodialysis yesterday and has been relatively non compliant with her medications and medical care at home. Unable to obtain ros as patient is sedated.       # Cardiac Arrest likely in setting of arrhythmia and CHF Exaccerbation   # h/o CAD s/p CABG (2019)  # h/o HFrEF 20%  # h/o ESRD    - EKG RBBB / wide complex tachycardia  - Troponin 0.2 , trend   - CTA negative for PE   - CXR : b/l pleural effusions, f/u official read  - d-dimer elevated ~ 4700 , CTA negative for PE   - c/w Torsemide , Metolazone , Hydralazine, Metoprolol, ASA , Statin  - ? no entresto , ESRD patient   - f/u Cardiology Dr. Gandara   - Dr. Mancia following   - strict intake and output , daily weight , f/u repeat TTE     # Right humerus and R femur fracture  - ortho following    # Odontoid fracture and ? T6 fracture   - f/u neurosurgery     # Elevated d-dimer  - CTA negative for PE   - f/u LE duplex  - no need for full anticoagulation now     # Elevated Troponin , likely secondary to demand ischemia and ESRD  - RBBB with wide complex tachycardia on EKG   - trend troponin  and EKG    # Mild transaminitis post cardiac arrest , trending down, monitor  - if not improving , then d/c atorvastatin and obtain RUQ ultrasound     # Elevated lipase, no evidence of pancreatitis on CT A/P , likely in setting of ESRD and cardiac arrest  - physical exam unremarkable  - no need for IVF given above and pulmonary edema    # Cystitis   - will obtain urine culture given inability to obtain ros   - Meropenem   - f/u urine cultures    # h/o ESRD on HD MWF  - last HD yesterday  - f/u nephrology consult  - f/u phosphorus level and c/w home dose sevelamer       # h/o DM   - fingersticks tid  - hold home insulin  - insulin protocol if > 180    # DVT PPX: heparin subq  # GI PPX: PPI  # Diet: NPO   # Activity: bedrest  # Bowel regimen    71 yo F with PMH of CAD s/p CABG (November 2019), HFrEF (EF 20%), AS, ESRD (MWF), bladder prolapse requiring chronic Castellon, Diabetes Type 2 on insulin presents from home s/p mechanical fall. When the history was taken in the emergency department, she The denied any dizziness/ palpitations/ aura/vertigo. She reported non-radiating R shoulder pain and non-radiating R hip pain on arrival. She was found to have R humeral fracture / R hip fracture / non displaced odontoid fracture/ acute rib fractures/ ? T6 vertebral fracture.     In the emergency department, while the patient was receiving trauma work up she was found pulseless by a PCA in the room ( she was not on the monitor at that time) and had a cardiac arrest. ROSC was achieved after 2 minutes of CPR (1 epinephrine was given and 1 calcium chloride iv). Patient was intubated and sedated. EKG showed RBBB with wide complex tachycardia. Per family at the bedside, the patient had her last hemodialysis yesterday and has been relatively non compliant with her medications and medical care at home. Unable to obtain ros as patient is sedated.       # Cardiac Arrest likely in setting of arrhythmia and CHF Exaccerbation   # h/o CAD s/p CABG (2019)  # h/o HFrEF 20%  # h/o ESRD    - EKG RBBB / wide complex tachycardia  - Troponin 0.2 , trend   - CTA negative for PE   - CXR : b/l pleural effusions, f/u official read  - d-dimer elevated ~ 4700 , CTA negative for PE   - c/w Torsemide , Metolazone , Hydralazine, Metoprolol, ASA , Statin  - ? no entresto , ESRD patient   - f/u Cardiology Dr. Gandara   - Dr. Mancia following   - strict intake and output , daily weight , f/u repeat TTE     # Right humerus and R femur fracture  - ortho following    # Odontoid fracture and ? T6 fracture   - f/u neurosurgery     # Elevated d-dimer  - CTA negative for PE   - f/u LE duplex  - no need for full anticoagulation now     # Elevated Troponin , likely secondary to demand ischemia and ESRD  - RBBB with wide complex tachycardia on EKG   - trend troponin  and EKG    # Mild transaminitis post cardiac arrest , trending down, monitor  - if not improving , then d/c atorvastatin and obtain RUQ ultrasound     # Elevated lipase, no evidence of pancreatitis on CT A/P , likely in setting of ESRD and cardiac arrest  - physical exam unremarkable  - no need for IVF given above and pulmonary edema    # Cystitis   - will obtain urine culture given inability to obtain ros   - Meropenem   - f/u urine cultures    # h/o ESRD on HD MWF  - last HD yesterday  - f/u nephrology consult  - f/u phosphorus level  - c/w  sevelamer once diet initiated      # h/o DM   - fingersticks tid  - hold home insulin  - insulin protocol if > 180    # DVT PPX: heparin subq  # GI PPX: PPI  # Diet: NPO   # Activity: bedrest  # Bowel regimen    Impression:      # Cardiac Arrest likely in setting of arrhythmia and CHF Exacerbation   # h/o CAD s/p CABG (2019)  # h/o HFrEF 20%  # h/o ESRD    - EKG RBBB / wide complex tachycardia  - Troponin 0.2 , trend   - CTA negative for PE   - CXR : b/l pleural effusions, f/u official read  - d-dimer elevated ~ 4700 , CTA negative for PE   - c/w Torsemide , Metolazone , Hydralazine, Metoprolol, ASA , Statin  - ? no entresto , ESRD patient   - f/u Cardiology Dr. Gandara   - Dr. Mancia following   - strict intake and output , daily weight , f/u repeat TTE     # Right humerus and R femur fracture  - ortho following    # Odontoid fracture and ? T6 fracture   - f/u neurosurgery     # Elevated d-dimer  - CTA negative for PE   - f/u LE duplex  - no need for full anticoagulation now     # Elevated Troponin , likely secondary to demand ischemia and ESRD  - RBBB with wide complex tachycardia on EKG   - trend troponin  and EKG    # Mild transaminitis post cardiac arrest , trending down, monitor  - if not improving , then d/c atorvastatin and obtain RUQ ultrasound     # Elevated lipase, no evidence of pancreatitis on CT A/P , likely in setting of ESRD and cardiac arrest  - physical exam unremarkable  - no need for IVF given above and pulmonary edema    # Cystitis   - will obtain urine culture given inability to obtain ros   - Meropenem   - f/u urine cultures    # h/o ESRD on HD MWF  - last HD yesterday  - f/u nephrology consult  - f/u phosphorus level  - c/w  sevelamer once diet initiated      # h/o DM   - fingersticks tid  - hold home insulin  - insulin protocol if > 180    # DVT PPX: heparin subq  # GI PPX: PPI  # Diet: NPO   # Activity: bedrest  # Bowel regimen

## 2021-04-01 NOTE — ED PROVIDER NOTE - PROGRESS NOTE DETAILS
AG: approx 0315 pt returned from xray suite noted to be unresponsive and pulseless, CPR initiated immediately, 1x epi, ROSC after approx 1.5min. Pt intubated for airway protection. Post-ROSC ECG w/ wide-complex tachycardia, RBBB, suspicious for STEMI Code STEMI called. Discussed humeral and femoral fractures with ortho team, who will come evaluate patient. CODE STEMI immediately activated after ROSC ECG was obtained (~40 min ago). Cardiology does not think pt had a STEMI. Recommend CT angio to r/o PE. Will cont to manage. Son is bedside. Will place central line and a-line. [place]- Delay in documentation due to patient care. I was called to the bedside by nursing staff. Pt apparently was transported to xray suite where she became unresponsive and was brought back. Pt was not given morphine prior to being taken for xrays. CPR initiated immediately. Patient intubated. ROSC s/p ~2 min of CPR w one dose epinephrine and calcium chloride IV. CODE STEMI immediately activated after ROSC ECG was obtained (~40 min ago). Cardiology does not think pt had a STEMI. Recommend CT angio to r/o PE. Will cont to manage and obtain CTs. Son is bedside. Will place central line and a-line. No sedation to evaluate for neurologic status. TTM. ICU consult. Cont to monitor. Vitals stable for now. Off pressors but w met acidosis. AG: called by radiology for odontoid fracture, c-collar placed, neurosx consulted. Pt w/ family in room beginning to spontaneously move head minimally, briefly opened eyes. Not on sedation.

## 2021-04-01 NOTE — CONSULT NOTE ADULT - SUBJECTIVE AND OBJECTIVE BOX
HPI:  71 yo F with PMH of CAD s/p CABG (2019), HFrEF (EF 20%), AS, ESRD (MWF), bladder prolapse requiring chronic Castellon, Diabetes Type 2 on insulin presents from home s/p mechanical fall. When the history was taken in the emergency department, she The denied any dizziness/ palpitations/ aura/vertigo. She reported non-radiating R shoulder pain and non-radiating R hip pain on arrival. She was found to have R humeral fracture / R hip fracture / non displaced odontoid fracture/ acute rib fractures/ ? T6 vertebral fracture.     In the emergency department, while the patient was receiving trauma work up she was found pulseless by a PCA in the room ( she was not on the monitor at that time) and had a cardiac arrest. ROSC was achieved after 2 minutes of CPR (1 epinephrine was given and 1 calcium chloride iv). Patient was intubated and sedated. EKG showed RBBB with wide complex tachycardia. Per family at the bedside, the patient had her last hemodialysis yesterday and has been relatively non compliant with her medications and medical care at home. Unable to obtain ros as patient is sedated.     T(C): 35 , HR: 60, BP: 158/67, RR: 20, SpO2: 99% vented  Labs: d-dimer ~ 4700, p-bnp 70,000, trop (0.20 <--0.17)  ABG pH, Arterial: 7.45, CO2: 36, Arterial O2: 63 , HCO3: 25 , Base Excess: 1.4 , SaO2: 94      CTH (-)                (2021 07:42)        PAST MEDICAL & SURGICAL HISTORY:  Diabetes    Congestive heart failure (CHF)    Pleural effusion due to congestive heart failure    End stage renal disease  on dialysis Mon, Wed, Fr    Uterine prolapse    Chronic indwelling Castellon catheter    History of repair of hip fracture  2019    S/P CABG (coronary artery bypass graft)  2019    History of thoracentesis    Chronic indwelling Castellon catheter        Home Medications:  aspirin 81 mg oral tablet, chewable: 1 tab(s) orally once a day (2021 11:06)  atorvastatin 40 mg oral tablet: 1 tab(s) orally once a day (2021 11:06)  INSULIN LISPRO KWIKPEN  100 UNIT/ML SOPN:  (2021 11:06)  METOLAZONE  5 MG TABS: 1  orally once a day (2021 11:06)  METOPROLOL SUCCINATE ER  25 MG TB24: 1  orally 2 times a day (2021 11:06)  SERTRALINE HCL  50 MG TABS:  (2021 11:06)  SEVELAMER CARBONATE 800MG TABLETS: TK 2 TS PO TID WITH MEALS (2021 11:06)  TORSEMIDE 20MG TABLETS: TK 3 TS PO BID (2021 11:06)  VITAMIN D2 18900YB (ERGO) CAP RX: TK 1 C PO WEEKLY (2021 11:06)      Allergies    No Known Allergies    Intolerances        MEDICATIONS  (STANDING):  aMIOdarone    Tablet 200 milliGRAM(s) Oral daily  aspirin  chewable 81 milliGRAM(s) Oral daily  atorvastatin Oral Tab/Cap - Peds 40 milliGRAM(s) Oral daily  cefepime   IVPB 2000 milliGRAM(s) IV Intermittent once  chlorhexidine 0.12% Liquid 15 milliLiter(s) Oral Mucosa every 12 hours  chlorhexidine 4% Liquid 1 Application(s) Topical <User Schedule>  dexMEDEtomidine Infusion 0.2 MICROgram(s)/kG/Hr (3.3 mL/Hr) IV Continuous <Continuous>  fentaNYL   Infusion. 0.5 MICROgram(s)/kG/Hr (3.3 mL/Hr) IV Continuous <Continuous>  heparin SubCutaneous Injection - Peds 5000 Unit(s) SubCutaneous every 12 hours  hydrALAZINE 25 milliGRAM(s) Oral three times a day  meropenem  IVPB      meropenem  IVPB 500 milliGRAM(s) IV Intermittent every 12 hours  metolazone Oral Tab/Cap - Peds 5 milliGRAM(s) Oral daily  norepinephrine Infusion 0.05 MICROgram(s)/kG/Min (6.19 mL/Hr) IV Continuous <Continuous>  pantoprazole  Injectable 40 milliGRAM(s) IV Push daily  senna 2 Tablet(s) Oral daily  sevelamer carbonate 800 milliGRAM(s) Oral three times a day  torsemide 80 milliGRAM(s) Oral two times a day    MEDICATIONS  (PRN):      ICU Vital Signs Last 24 Hrs  T(C): 35.8 (2021 11:50), Max: 35.8 (2021 11:50)  T(F): 96.5 (2021 11:50), Max: 96.5 (2021 11:50)  HR: 64 (2021 11:50) (60 - 99)  BP: 158/67 (2021 06:36) (86/55 - 158/67)  BP(mean): --  ABP: 152/62 (2021 11:50) (82/50 - 164/73)  ABP(mean): 88 (2021 11:50) (59 - 107)  RR: 20 (2021 11:50) (18 - 20)  SpO2: 100% (2021 11:50) (95% - 100%)      I&O's Detail      CBC Full  -  ( 2021 08:56 )  WBC Count : 5.76 K/uL  RBC Count : 2.89 M/uL  Hemoglobin : 8.2 g/dL  Hematocrit : 26.0 %  Platelet Count - Automated : 133 K/uL  Mean Cell Volume : 90.0 fL  Mean Cell Hemoglobin : 28.4 pg  Mean Cell Hemoglobin Concentration : 31.5 g/dL  Auto Neutrophil # : 4.75 K/uL  Auto Lymphocyte # : 0.50 K/uL  Auto Monocyte # : 0.42 K/uL  Auto Eosinophil # : 0.04 K/uL  Auto Basophil # : 0.02 K/uL  Auto Neutrophil % : 82.5 %  Auto Lymphocyte % : 8.7 %  Auto Monocyte % : 7.3 %  Auto Eosinophil % : 0.7 %  Auto Basophil % : 0.3 %    0401    138  |  100  |  36<H>  ----------------------------<  116<H>  3.7   |  24  |  3.6<H>    Ca    9.2      2021 08:56  Mg     2.1     04-    TPro  5.8<L>  /  Alb  3.5  /  TBili  0.3  /  DBili  x   /  AST  73<H>  /  ALT  43<H>  /  AlkPhos  152<H>  04-01    CARDIAC MARKERS ( 2021 08:56 )  x     / 0.20 ng/mL / x     / x     / 4.8 ng/mL  CARDIAC MARKERS ( 2021 03:19 )  x     / 0.17 ng/mL / x     / x     / x          Urinalysis Basic - ( 2021 02:50 )    Color: Light Yellow / Appearance: Clear / S.011 / pH: x  Gluc: x / Ketone: Negative  / Bili: Negative / Urobili: <2 mg/dL   Blood: x / Protein: Negative / Nitrite: Negative   Leuk Esterase: Moderate / RBC: 2 /HPF / WBC 8 /HPF   Sq Epi: x / Non Sq Epi: 1 /HPF / Bacteria: Many      EXAM  Intubated but awake, follows commands.   PERRLA, squeezes both hands on demand, moves toes and legs on demand  daughter states she fell 2 weeks ago as well    Imaging: < from: CT Cervical Spine No Cont (21 @ 06:06) >  IMPRESSION:    Lucency through base of odontoid extending into the lateral masses of C2 suggesting nondisplaced fracture. Small volume of blood within anterior extraduralspace. Consider further evaluation with MRI as clinically warranted.    Comminuted fracture right humeral neck, visualized on .    < end of copied text >      Assessment/Plan - C2 Fx, Hard collar, neuro appears intact HPI:  71 yo F with PMH of CAD s/p CABG (2019), HFrEF (EF 20%), AS, ESRD (MWF), bladder prolapse requiring chronic Castellon, Diabetes Type 2 on insulin presents from home s/p mechanical fall. When the history was taken in the emergency department, she denied any dizziness/ palpitations/ aura/vertigo. She reported non-radiating R shoulder pain and non-radiating R hip pain on arrival. She was found to have R humeral fracture / R hip fracture / non displaced odontoid fracture/ acute rib fractures/ ? T6 vertebral fracture.     In the emergency department, while the patient was receiving trauma work up she was found pulseless by a PCA in the room ( she was not on the monitor at that time) and had a cardiac arrest. ROSC was achieved after 2 minutes of CPR (1 epinephrine was given and 1 calcium chloride iv). Patient was intubated and sedated. EKG showed RBBB with wide complex tachycardia. Per family at the bedside, the patient had her last hemodialysis yesterday and has been relatively non compliant with her medications and medical care at home. Unable to obtain ros as patient is sedated.     T(C): 35 , HR: 60, BP: 158/67, RR: 20, SpO2: 99% vented  Labs: d-dimer ~ 4700, p-bnp 70,000, trop (0.20 <--0.17)  ABG pH, Arterial: 7.45, CO2: 36, Arterial O2: 63 , HCO3: 25 , Base Excess: 1.4 , SaO2: 94      CTH (-)          (2021 07:42)        PAST MEDICAL & SURGICAL HISTORY:  Diabetes    Congestive heart failure (CHF)    Pleural effusion due to congestive heart failure    End stage renal disease  on dialysis Mon, Wed, Fr    Uterine prolapse    Chronic indwelling Castellon catheter    History of repair of hip fracture  2019    S/P CABG (coronary artery bypass graft)  2019    History of thoracentesis    Chronic indwelling Castellon catheter        Home Medications:  aspirin 81 mg oral tablet, chewable: 1 tab(s) orally once a day (2021 11:06)  atorvastatin 40 mg oral tablet: 1 tab(s) orally once a day (2021 11:06)  INSULIN LISPRO KWIKPEN  100 UNIT/ML SOPN:  (2021 11:06)  METOLAZONE  5 MG TABS: 1  orally once a day (2021 11:06)  METOPROLOL SUCCINATE ER  25 MG TB24: 1  orally 2 times a day (2021 11:06)  SERTRALINE HCL  50 MG TABS:  (2021 11:06)  SEVELAMER CARBONATE 800MG TABLETS: TK 2 TS PO TID WITH MEALS (2021 11:06)  TORSEMIDE 20MG TABLETS: TK 3 TS PO BID (2021 11:06)  VITAMIN D2 36623IT (ERGO) CAP RX: TK 1 C PO WEEKLY (2021 11:06)      Allergies    No Known Allergies    Intolerances        MEDICATIONS  (STANDING):  aMIOdarone    Tablet 200 milliGRAM(s) Oral daily  aspirin  chewable 81 milliGRAM(s) Oral daily  atorvastatin Oral Tab/Cap - Peds 40 milliGRAM(s) Oral daily  cefepime   IVPB 2000 milliGRAM(s) IV Intermittent once  chlorhexidine 0.12% Liquid 15 milliLiter(s) Oral Mucosa every 12 hours  chlorhexidine 4% Liquid 1 Application(s) Topical <User Schedule>  dexMEDEtomidine Infusion 0.2 MICROgram(s)/kG/Hr (3.3 mL/Hr) IV Continuous <Continuous>  fentaNYL   Infusion. 0.5 MICROgram(s)/kG/Hr (3.3 mL/Hr) IV Continuous <Continuous>  heparin SubCutaneous Injection - Peds 5000 Unit(s) SubCutaneous every 12 hours  hydrALAZINE 25 milliGRAM(s) Oral three times a day  meropenem  IVPB      meropenem  IVPB 500 milliGRAM(s) IV Intermittent every 12 hours  metolazone Oral Tab/Cap - Peds 5 milliGRAM(s) Oral daily  norepinephrine Infusion 0.05 MICROgram(s)/kG/Min (6.19 mL/Hr) IV Continuous <Continuous>  pantoprazole  Injectable 40 milliGRAM(s) IV Push daily  senna 2 Tablet(s) Oral daily  sevelamer carbonate 800 milliGRAM(s) Oral three times a day  torsemide 80 milliGRAM(s) Oral two times a day    MEDICATIONS  (PRN):      ICU Vital Signs Last 24 Hrs  T(C): 35.8 (2021 11:50), Max: 35.8 (2021 11:50)  T(F): 96.5 (2021 11:50), Max: 96.5 (2021 11:50)  HR: 64 (2021 11:50) (60 - 99)  BP: 158/67 (2021 06:36) (86/55 - 158/67)  BP(mean): --  ABP: 152/62 (2021 11:50) (82/50 - 164/73)  ABP(mean): 88 (2021 11:50) (59 - 107)  RR: 20 (2021 11:50) (18 - 20)  SpO2: 100% (2021 11:50) (95% - 100%)      I&O's Detail      CBC Full  -  ( 2021 08:56 )  WBC Count : 5.76 K/uL  RBC Count : 2.89 M/uL  Hemoglobin : 8.2 g/dL  Hematocrit : 26.0 %  Platelet Count - Automated : 133 K/uL  Mean Cell Volume : 90.0 fL  Mean Cell Hemoglobin : 28.4 pg  Mean Cell Hemoglobin Concentration : 31.5 g/dL  Auto Neutrophil # : 4.75 K/uL  Auto Lymphocyte # : 0.50 K/uL  Auto Monocyte # : 0.42 K/uL  Auto Eosinophil # : 0.04 K/uL  Auto Basophil # : 0.02 K/uL  Auto Neutrophil % : 82.5 %  Auto Lymphocyte % : 8.7 %  Auto Monocyte % : 7.3 %  Auto Eosinophil % : 0.7 %  Auto Basophil % : 0.3 %    0401    138  |  100  |  36<H>  ----------------------------<  116<H>  3.7   |  24  |  3.6<H>    Ca    9.2      2021 08:56  Mg     2.1     04-    TPro  5.8<L>  /  Alb  3.5  /  TBili  0.3  /  DBili  x   /  AST  73<H>  /  ALT  43<H>  /  AlkPhos  152<H>  04-01    CARDIAC MARKERS ( 2021 08:56 )  x     / 0.20 ng/mL / x     / x     / 4.8 ng/mL  CARDIAC MARKERS ( 2021 03:19 )  x     / 0.17 ng/mL / x     / x     / x          Urinalysis Basic - ( 2021 02:50 )    Color: Light Yellow / Appearance: Clear / S.011 / pH: x  Gluc: x / Ketone: Negative  / Bili: Negative / Urobili: <2 mg/dL   Blood: x / Protein: Negative / Nitrite: Negative   Leuk Esterase: Moderate / RBC: 2 /HPF / WBC 8 /HPF   Sq Epi: x / Non Sq Epi: 1 /HPF / Bacteria: Many      EXAM  Intubated but awake, follows commands.   PERRLA, squeezes both hands on demand, moves toes and legs on demand  daughter states she fell 2 weeks ago as well  Eyes: Clear eyes   ENT: No ear discharge  Neck: Trachea is central, JVD -ve  Lungs: CTA, no crackles   Heart: Regular heart sounds, no murmurs   GI: Abdomen is soft, NT  Neuro: Opens eyes but remains sedated, intubated    Psych: Calm affect   Integument: No skin bruises      Imaging: < from: CT Cervical Spine No Cont (21 @ 06:06) >  IMPRESSION:    Lucency through base of odontoid extending into the lateral masses of C2 suggesting nondisplaced fracture. Small volume of blood within anterior extraduralspace. Consider further evaluation with MRI as clinically warranted.    Comminuted fracture right humeral neck, visualized on .    < end of copied text >      Assessment/Plan - C2 Fx, Hard collar, neuro appears intact

## 2021-04-01 NOTE — H&P ADULT - HISTORY OF PRESENT ILLNESS
69 yo F with PMH of CAD s/p CABG (November 2019), HFrEF (EF 20%), AS,  ESRD on hemodialysis MWF, chronic pleural effusions on the left for which she get thoracentesis periodically, bladder prolapse requiring chronic Castellon, Diabetes Type 2 on insulin presents from home s/p mechanical fall. The patient tripped and landed on her right side. No abnormal muscle movement and no loss of consciousness. The patient denied any dizziness/ palpitations/ aura when she arrived to the emergency department. She reported non-radiating R shoulder pain and non-radiating R hip pain on arrival. In the emergency department, while the patient was receiving trauma work up she was found pulseless and had a cardiac arrest. ROSC was achieved after 2 minutes of cpr (1 epinephrine was given and 1 calcium chloride iv). Patient was intubated and sedated. EKG showed RBBB      71 yo F with PMH of CAD s/p CABG (November 2019), HFrEF (EF 20%), AS,  ESRD on hemodialysis MWF, chronic pleural effusions on the left for which she get thoracentesis periodically, bladder prolapse requiring chronic Castellon, Diabetes Type 2 on insulin presents from home s/p mechanical fall. The patient tripped and landed on her right side. No abnormal muscle movement and no loss of consciousness. The patient denied any dizziness/ palpitations/ aura when she arrived to the emergency department. She reported non-radiating R shoulder pain and non-radiating R hip pain on arrival. In the emergency department, while the patient was receiving trauma work up she was found pulseless and had a cardiac arrest. ROSC was achieved after 2 minutes of cpr (1 epinephrine was given and 1 calcium chloride iv). Patient was intubated and sedated. EKG showed RBBB with wide complex tachycardia.      69 yo F with PMH of CAD s/p CABG (November 2019), HFrEF (EF 20%), AS, ESRD (MWF), bladder prolapse requiring chronic Castellon, Diabetes Type 2 on insulin presents from home s/p mechanical fall. When the history was taken in the emergency department, she The denied any dizziness/ palpitations/ aura/vertigo. She reported non-radiating R shoulder pain and non-radiating R hip pain on arrival. She was found to have R humeral fracture / R hip fracture / non displaced odontoid fracture/ acute rib fractures/ ? T6 vertebral fracture.     In the emergency department, while the patient was receiving trauma work up she was found pulseless by a PCA in the room ( she was not on the monitor at that time) and had a cardiac arrest. ROSC was achieved after 2 minutes of CPR (1 epinephrine was given and 1 calcium chloride iv). Patient was intubated and sedated. EKG showed RBBB with wide complex tachycardia. Per family at the bedside, the patient had her last hemodialysis yesterday and has been relatively non compliant with her medications and medical care at home. Unable to obtain ros as patient is sedated.     T(C): 35 , HR: 60, BP: 158/67, RR: 20, SpO2: 99% vented  Labs: d-dimer ~ 4700, p-bnp 70,000, trop (0.20 <--0.17)  ABG pH, Arterial: 7.45, CO2: 36, Arterial O2: 63 , HCO3: 25 , Base Excess: 1.4 , SaO2: 94      CTH (-)

## 2021-04-01 NOTE — H&P ADULT - NSICDXPASTMEDICALHX_GEN_ALL_CORE_FT
PAST MEDICAL HISTORY:  Chronic indwelling Castellon catheter     Congestive heart failure (CHF)     Diabetes     End stage renal disease on dialysis Mon, Wed, Fr    Pleural effusion due to congestive heart failure     Uterine prolapse

## 2021-04-01 NOTE — ED ADULT NURSE REASSESSMENT NOTE - NS ED NURSE REASSESS COMMENT FT1
PT came back from Xray unresponsive. Compressions started. Pt intubated by MD. PT son notified of situation.

## 2021-04-01 NOTE — ED PROVIDER NOTE - PHYSICAL EXAMINATION
CONSTITUTIONAL: laying in bed, uncomfortable, but in no acute distress  SKIN: Warm dry, normal skin turgor  HEAD: NCAT  EYES: EOMI, PERRLA, no scleral icterus, conjunctiva pink  ENT: dry mucous membranes with no erythema or exudates  NECK: Supple; non tender. Full ROM.  CARD: RRR, no murmurs.  RESP: clear to ausculation b/l. No crackles or wheezing.  CHEST: hemodialysis catheter in R chest wall  ABD: soft, non-tender, non-distended, no rebound or guarding.  EXT: decreased ROM of R shoulder and R hip 2/2 pain, full ROM of toes, dp 2+ b/l, no change in sensation  NEURO: normal motor. normal sensory. CN II-XII intact.   PSYCH: Cooperative, appropriate.

## 2021-04-01 NOTE — ED ADULT TRIAGE NOTE - CHIEF COMPLAINT QUOTE
pt BIBEMS s/p mechanical fall on tile on floor, pt complains of right shoulder and right hip pain, denies loc, blood thinners

## 2021-04-01 NOTE — CONSULT NOTE ADULT - TIME BILLING
The majority of the time was spent examining the patient, review of imaging studies, review of interim events, and formulation of the plan.

## 2021-04-01 NOTE — H&P ADULT - NSHPPHYSICALEXAM_GEN_ALL_CORE
Vital Signs (24 Hrs):  T(C): 35.1 (04-01-21 @ 10:50), Max: 35.4 (04-01-21 @ 01:27)  HR: 62 (04-01-21 @ 10:50) (60 - 99)  BP: 158/67 (04-01-21 @ 06:36) (86/55 - 158/67)  RR: 20 (04-01-21 @ 10:50) (18 - 20)  SpO2: 100% (04-01-21 @ 10:50) (95% - 100%) ventilator    PHYSICAL EXAM:    HEAD:  Atraumatic, Normocephalic  EYES: EOMI, PERRLA, conjunctiva and sclera clear  ENT: Moist mucous membranes  NECK: Supple, No JVD  CHEST/LUNG: DECREASED BILATERAL LOWER LOBES  HEART: Regular rate and rhythm  ABDOMEN: Bowel sounds present; Soft, Nontender, MILDLY DISTENDED.   EXTREMITIES:  2+ Peripheral Pulses, brisk capillary refill. No clubbing, cyanosis, or edema  NERVOUS SYSTEM:  FOLLOWS COMMANDS , MOVES EXTREMITIES

## 2021-04-02 NOTE — PROGRESS NOTE ADULT - ASSESSMENT
1. Odontoid fracture. Neurosurgery following.  2. R hip / R humerus fracture. Ortho following.  3. ESRD on HD MWF. Hold off on HD today. Will plan for HD tomorrow if hemodynamically stable.  4. Anemia. EPO with HD.  5. Secondary hyperparathyroidism. Hectorol with HD.  6. Hyperphosphatemia. Start sevelamer 1600mg PO TID with meals once diet initiated.

## 2021-04-02 NOTE — PROGRESS NOTE ADULT - SUBJECTIVE AND OBJECTIVE BOX
Neurocritical Care Progress Note    GUS WILBURN    70y     [id number]   Daily Height in cm: 157.48 (2021 23:23)    SARS-CoV-2: NotDetec (2021 06:10)  COVID-19 PCR: NotDetec (09 Mar 2021 11:17)  SARS-CoV-2: NotDetec (02 Mar 2021 15:40)    Patient is a 70y old  Female who presents with a chief complaint of Fall (2021 09:40)    HPI:  71 yo F with PMH of CAD s/p CABG (2019), HFrEF (EF 20%), AS, ESRD (MWF), bladder prolapse requiring chronic Castellon, Diabetes Type 2 on insulin presents from home s/p mechanical fall. When the history was taken in the emergency department, she The denied any dizziness/ palpitations/ aura/vertigo. She reported non-radiating R shoulder pain and non-radiating R hip pain on arrival. She was found to have R humeral fracture / R hip fracture / non displaced odontoid fracture/ acute rib fractures/ ? T6 vertebral fracture.     In the emergency department, while the patient was receiving trauma work up she was found pulseless by a PCA in the room ( she was not on the monitor at that time) and had a cardiac arrest. ROSC was achieved after 2 minutes of CPR (1 epinephrine was given and 1 calcium chloride iv). Patient was intubated and sedated. EKG showed RBBB with wide complex tachycardia. Per family at the bedside, the patient had her last hemodialysis yesterday and has been relatively non compliant with her medications and medical care at home. Unable to obtain ros as patient is sedated.     T(C): 35 , HR: 60, BP: 158/67, RR: 20, SpO2: 99% vented  Labs: d-dimer ~ 4700, p-bnp 70,000, trop (0.20 <--0.17)  ABG pH, Arterial: 7.45, CO2: 36, Arterial O2: 63 , HCO3: 25 , Base Excess: 1.4 , SaO2: 94      CTH (-) (2021 07:42)    Allergies:  No Known Allergies    PAST MEDICAL & SURGICAL HISTORY:  Diabetes    Congestive heart failure (CHF)    Pleural effusion due to congestive heart failure    End stage renal disease  on dialysis Mon, Wed, Fr    Uterine prolapse    Chronic indwelling Castellon catheter    History of repair of hip fracture      S/P CABG (coronary artery bypass graft)  2019    History of thoracentesis    Chronic indwelling Castellon catheter    Home Medications:  aspirin 81 mg oral tablet, chewable: 1 tab(s) orally once a day (2021 11:06)  atorvastatin 40 mg oral tablet: 1 tab(s) orally once a day (2021 11:06)  INSULIN LISPRO KWIKPEN  100 UNIT/ML SOPN:  (2021 11:06)  METOLAZONE  5 MG TABS: 1  orally once a day (2021 11:06)  METOPROLOL SUCCINATE ER  25 MG TB24: 1  orally 2 times a day (2021 11:06)  SERTRALINE HCL  50 MG TABS:  (2021 11:06)  SEVELAMER CARBONATE 800MG TABLETS: TK 2 TS PO TID WITH MEALS (2021 11:06)  TORSEMIDE 20MG TABLETS: TK 3 TS PO BID (2021 11:06)  VITAMIN D2 62669LM (ERGO) CAP RX: TK 1 C PO WEEKLY (2021 11:06)    24 Hour Events:  No clinical seizures reported overnight.     Exam:  Mentation: On propofol 40mcg/kg. Eye opening to voice, not following commands. Language KAREN due to presence of ET tube  Cranial Nerves:  	II – No blink to threat  	III/IV/VI – 3 mm b/l reactive pupils. No ptosis, skew, dysconjugate or gaze preference noted  	V – Corneals present b/l  	VII – Difficult to assess facial palsy due to ET securement device  	VIII – No nystagmus. KAREN dolls; pt is in C-collar  	IX/X – Weak cough to ET suction present (KAREN if palate rises symetrically or if uvula rises midline)  	XI – Deferred (AKREN SCM strength/symmetry)  	XII – Deferred (KAREN if tongue protrusion is at midline)  Motor: No tremors. No posturing witnessed. Spontaneous movement LE b/l. Right clavicle and right hip fracture, but slight movement was seen in both regardless  Sensory: W/d UE and LE b/l to painful nailbed pressure  Cerebellum: KAREN dysmetria. Gait deferred    Current Medications:  aMIOdarone    Tablet 200 milliGRAM(s) Oral daily  aMIOdarone Infusion 1 mG/Min IV Continuous <Continuous>  aspirin  chewable 81 milliGRAM(s) Oral daily  atorvastatin Oral Tab/Cap - Peds 40 milliGRAM(s) Oral daily  cefTRIAXone   IVPB 1000 milliGRAM(s) IV Intermittent every 24 hours  chlorhexidine 0.12% Liquid 15 milliLiter(s) Oral Mucosa every 12 hours  chlorhexidine 4% Liquid 1 Application(s) Topical <User Schedule>  dexMEDEtomidine Infusion 0.2 MICROgram(s)/kG/Hr IV Continuous <Continuous>  esMOLOL  Infusion 50 MICROgram(s)/kG/Min IV Continuous <Continuous>  esMOLOL Bolus 60642 MICROGram(s) IV Push once  fentaNYL   Infusion. 0.5 MICROgram(s)/kG/Hr IV Continuous <Continuous>  heparin   Injectable 5000 Unit(s) SubCutaneous every 12 hours  levETIRAcetam  IVPB 375 milliGRAM(s) IV Intermittent every 12 hours  levETIRAcetam  IVPB 250 milliGRAM(s) IV Intermittent <User Schedule>  metolazone Oral Tab/Cap - Peds 5 milliGRAM(s) Oral daily  norepinephrine Infusion 0.05 MICROgram(s)/kG/Min IV Continuous <Continuous>  pantoprazole  Injectable 40 milliGRAM(s) IV Push daily  propofol Infusion 10 MICROgram(s)/kG/Min IV Continuous <Continuous>  senna 2 Tablet(s) Oral daily  torsemide 80 milliGRAM(s) Oral two times a day    Vital Signs Last 24 Hrs  T(C): 36.3 (2021 12:00), Max: 38.1 (2021 23:23)  T(F): 97.4 (2021 12:00), Max: 100.6 (2021 23:23)  HR: 62 (2021 12:00) (58 - 174)  BP: 135/64 (2021 23:23) (135/64 - 135/64)  BP(mean): 94 (2021 23:23) (94 - 94)  RR: 20 (2021 12:00) (19 - 31)  SpO2: 100% (2021 12:00) (86% - 100%)     I/Os:  I&O's Detail    2021 07:01  -  2021 07:00  --------------------------------------------------------  IN:    Enteral Tube Flush: 150 mL    Norepinephrine: 29.6 mL    Propofol: 142.4 mL  Total IN: 322 mL    OUT:    Indwelling Catheter - Urethral (mL): 30 mL  Total OUT: 30 mL    Total NET: 292 mL      2021 07:01  -  2021 13:09  --------------------------------------------------------  IN:    Amiodarone: 33.3 mL    Esmolol: 52.2 mL    IV PiggyBack: 150 mL    Norepinephrine: 25 mL    Propofol: 47.2 mL  Total IN: 307.7 mL    OUT:    Indwelling Catheter - Urethral (mL): 20 mL  Total OUT: 20 mL    Total NET: 287.7 mL    Labs:  ABG - ( 2021 11:03 )  pH, Arterial: 7.40  pH, Blood: x     /  pCO2: 31    /  pO2: 394   / HCO3: 19    / Base Excess: -5.0  /  SaO2: 100       Mode: AC/ CMV (Assist Control/ Continuous Mandatory Ventilation)  RR (machine): 20  TV (machine): 400  FiO2: 50  PEEP: 5  MAP: 12  PIP: 30    LIVER FUNCTIONS - ( 2021 04:50 )  Alb: 3.3 g/dL / Pro: 5.6 g/dL / ALK PHOS: 121 U/L / ALT: 24 U/L / AST: 30 U/L / GGT: x           Lactate, Blood: 1.3 mmol/L ( @ 08:56)  Lactate, Blood: 5.5 mmol/L ( @ 03:19)  Lactate, Blood: 1.6 mmol/L ( @ 02:50)     Urinalysis Basic - ( 2021 10:44 )    Color: Yellow / Appearance: Turbid / S.027 / pH: x  Gluc: x / Ketone: Negative  / Bili: Negative / Urobili: <2 mg/dL   Blood: x / Protein: 100 mg/dL / Nitrite: Negative   Leuk Esterase: Large / RBC: 14 /HPF / WBC >720 /HPF   Sq Epi: x / Non Sq Epi: 3 /HPF / Bacteria: Moderate                        7.7    7.41  )-----------( 128      ( 2021 10:44 )             24.3      04-02    138  |  102  |  45<H>  ----------------------------<  92  3.8   |  20  |  4.2<HH>    Ca    8.9      2021 04:50  Mg     2.1     04-02    TPro  5.6<L>  /  Alb  3.3<L>  /  TBili  0.3  /  DBili  x   /  AST  30  /  ALT  24  /  AlkPhos  121<H>  04-02     PT/INR - ( 2021 04:50 )   PT: 15.80 sec;   INR: 1.37 ratio      PTT - ( 2021 04:50 )  PTT:32.6 sec  Adult Advanced Hemodynamics Last 24 Hrs  CVP(mm Hg): --  CVP(cm H2O): --  CO: --  CI: --  PA: --  PA(mean): --  PCWP: --  SVR: --  SVRI: --  PVR: --  PVRI: --  CARDIAC MARKERS ( 2021 04:50 )  x     / 0.21 ng/mL / x     / x     / x      CARDIAC MARKERS ( 2021 23:30 )  x     / 0.24 ng/mL / x     / x     / x      CARDIAC MARKERS ( 2021 18:27 )  x     / 0.22 ng/mL / 34 U/L / x     / x      CARDIAC MARKERS ( 2021 17:18 )  x     / 0.22 ng/mL / x     / x     / x      CARDIAC MARKERS ( 2021 08:56 )  x     / 0.20 ng/mL / x     / x     / 4.8 ng/mL  CARDIAC MARKERS ( 2021 03:19 )  x     / 0.17 ng/mL / x     / x     / x        Diagnostics/ Results:  Last CTH:     Last CTA/MRA:    Last CTP:    Last MRI:    Last TCD:    Last EEG:    Last Echo:    Last EKG:    Chest Xray:    ROS:  [X] A ten-point ROS was otherwise negative except as noted in HPI    Assessment:      Plan:  Neurology:      Respiratory:  -Keep HOB > 35; aspiration precautions     Cardiology:      Vascular:      GI/Nutrition:  -Diet, NPO:   Except Medications (21 @ 19:14) [Active]        -Bowel Regimen ->  -PPx -> Pantoprazole       / Renal/ Fluids/ Electrolytes:      Hematology/ Oncology:  -DVT PPx -> Heparin SQ    Lovenox SQ    Heparin gtt     SCDs      Infectious Disease:      Musculoskeletol:      Endocrine:      Tubes/ Lines/ Drains:  Central    Peripheral    A-line    Castellon    NGT/ OGT    Chest    EVD    Disposition:  Continue medical management as per primary team in:   ICU   CCU   SICU   Stroke Unit   Stepdown    CODE STATUS w/ Next of Kin:   DNI   DNR   FULL    Patient seen and case discussed with NCC attending; see attending attestation  Please call w/ questions  Indu Vasquez NP  [id number]             Neurocritical Care Progress Note    GUS WILBURN    70y     [id number]   Daily Height in cm: 157.48 (2021 23:23)    SARS-CoV-2: NotDetec (2021 06:10)  COVID-19 PCR: NotDetec (09 Mar 2021 11:17)  SARS-CoV-2: NotDetec (02 Mar 2021 15:40)    Patient is a 70y old  Female who presents with a chief complaint of Fall (2021 09:40)    HPI:  71 yo F with PMH of CAD s/p CABG (2019), HFrEF (EF 20%), AS, ESRD (MWF), bladder prolapse requiring chronic Castellon, Diabetes Type 2 on insulin presents from home s/p mechanical fall. When the history was taken in the emergency department, she The denied any dizziness/ palpitations/ aura/vertigo. She reported non-radiating R shoulder pain and non-radiating R hip pain on arrival. She was found to have R humeral fracture / R hip fracture / non displaced odontoid fracture/ acute rib fractures/ ? T6 vertebral fracture.     In the emergency department, while the patient was receiving trauma work up she was found pulseless by a PCA in the room ( she was not on the monitor at that time) and had a cardiac arrest. ROSC was achieved after 2 minutes of CPR (1 epinephrine was given and 1 calcium chloride iv). Patient was intubated and sedated. EKG showed RBBB with wide complex tachycardia. Per family at the bedside, the patient had her last hemodialysis yesterday and has been relatively non compliant with her medications and medical care at home. Unable to obtain ros as patient is sedated.     T(C): 35 , HR: 60, BP: 158/67, RR: 20, SpO2: 99% vented  Labs: d-dimer ~ 4700, p-bnp 70,000, trop (0.20 <--0.17)  ABG pH, Arterial: 7.45, CO2: 36, Arterial O2: 63 , HCO3: 25 , Base Excess: 1.4 , SaO2: 94      CTH (-) (2021 07:42)    Allergies:  No Known Allergies    PAST MEDICAL & SURGICAL HISTORY:  Diabetes    Congestive heart failure (CHF)    Pleural effusion due to congestive heart failure    End stage renal disease  on dialysis Mon, Wed, Fr    Uterine prolapse    Chronic indwelling Castellon catheter    History of repair of hip fracture      S/P CABG (coronary artery bypass graft)  2019    History of thoracentesis    Chronic indwelling Castellon catheter    Home Medications:  aspirin 81 mg oral tablet, chewable: 1 tab(s) orally once a day (2021 11:06)  atorvastatin 40 mg oral tablet: 1 tab(s) orally once a day (2021 11:06)  INSULIN LISPRO KWIKPEN  100 UNIT/ML SOPN:  (2021 11:06)  METOLAZONE  5 MG TABS: 1  orally once a day (2021 11:06)  METOPROLOL SUCCINATE ER  25 MG TB24: 1  orally 2 times a day (2021 11:06)  SERTRALINE HCL  50 MG TABS:  (2021 11:06)  SEVELAMER CARBONATE 800MG TABLETS: TK 2 TS PO TID WITH MEALS (2021 11:06)  TORSEMIDE 20MG TABLETS: TK 3 TS PO BID (2021 11:06)  VITAMIN D2 19031RY (ERGO) CAP RX: TK 1 C PO WEEKLY (2021 11:06)    24 Hour Events:  No clinical seizures reported overnight.     Exam:  Mentation: On propofol 40mcg/kg. Eye opening to voice, not following commands. Language KAREN due to presence of ET tube  Cranial Nerves:  	II – No blink to threat  	III/IV/VI – 3 mm b/l reactive pupils. No ptosis, skew, dysconjugate or gaze preference noted  	V – Corneals present b/l  	VII – Difficult to assess facial palsy due to ET securement device  	VIII – No nystagmus. KAREN dolls; pt is in C-collar  	IX/X – Weak cough to ET suction present (KAREN if palate rises symetrically or if uvula rises midline)  	XI – Deferred (KAREN SCM strength/symmetry)  	XII – Deferred (KAREN if tongue protrusion is at midline)  Motor: No tremors. No posturing witnessed. Spontaneous movement LE b/l. Right clavicle and right hip fracture, but slight movement was seen in both regardless  Sensory: W/d UE and LE b/l to painful nailbed pressure  Cerebellum: KAREN dysmetria. Gait deferred    Current Medications:  aMIOdarone    Tablet 200 milliGRAM(s) Oral daily  aMIOdarone Infusion 1 mG/Min IV Continuous <Continuous>  aspirin  chewable 81 milliGRAM(s) Oral daily  atorvastatin Oral Tab/Cap - Peds 40 milliGRAM(s) Oral daily  cefTRIAXone   IVPB 1000 milliGRAM(s) IV Intermittent every 24 hours  chlorhexidine 0.12% Liquid 15 milliLiter(s) Oral Mucosa every 12 hours  chlorhexidine 4% Liquid 1 Application(s) Topical <User Schedule>  dexMEDEtomidine Infusion 0.2 MICROgram(s)/kG/Hr IV Continuous <Continuous>  esMOLOL  Infusion 50 MICROgram(s)/kG/Min IV Continuous <Continuous>  esMOLOL Bolus 04646 MICROGram(s) IV Push once  fentaNYL   Infusion. 0.5 MICROgram(s)/kG/Hr IV Continuous <Continuous>  heparin   Injectable 5000 Unit(s) SubCutaneous every 12 hours  levETIRAcetam  IVPB 375 milliGRAM(s) IV Intermittent every 12 hours  levETIRAcetam  IVPB 250 milliGRAM(s) IV Intermittent <User Schedule>  metolazone Oral Tab/Cap - Peds 5 milliGRAM(s) Oral daily  norepinephrine Infusion 0.05 MICROgram(s)/kG/Min IV Continuous <Continuous>  pantoprazole  Injectable 40 milliGRAM(s) IV Push daily  propofol Infusion 10 MICROgram(s)/kG/Min IV Continuous <Continuous>  senna 2 Tablet(s) Oral daily  torsemide 80 milliGRAM(s) Oral two times a day    Vital Signs Last 24 Hrs  T(C): 36.3 (2021 12:00), Max: 38.1 (2021 23:23)  T(F): 97.4 (2021 12:00), Max: 100.6 (2021 23:23)  HR: 62 (2021 12:00) (58 - 174)  BP: 135/64 (2021 23:23) (135/64 - 135/64)  BP(mean): 94 (2021 23:23) (94 - 94)  RR: 20 (2021 12:00) (19 - 31)  SpO2: 100% (2021 12:00) (86% - 100%)     I/Os:  I&O's Detail    2021 07:01  -  2021 07:00  --------------------------------------------------------  IN:    Enteral Tube Flush: 150 mL    Norepinephrine: 29.6 mL    Propofol: 142.4 mL  Total IN: 322 mL    OUT:    Indwelling Catheter - Urethral (mL): 30 mL  Total OUT: 30 mL    Total NET: 292 mL      2021 07:01  -  2021 13:09  --------------------------------------------------------  IN:    Amiodarone: 33.3 mL    Esmolol: 52.2 mL    IV PiggyBack: 150 mL    Norepinephrine: 25 mL    Propofol: 47.2 mL  Total IN: 307.7 mL    OUT:    Indwelling Catheter - Urethral (mL): 20 mL  Total OUT: 20 mL    Total NET: 287.7 mL    Labs:  ABG - ( 2021 11:03 )  pH, Arterial: 7.40  pH, Blood: x     /  pCO2: 31    /  pO2: 394   / HCO3: 19    / Base Excess: -5.0  /  SaO2: 100       Mode: AC/ CMV (Assist Control/ Continuous Mandatory Ventilation)  RR (machine): 20  TV (machine): 400  FiO2: 50  PEEP: 5  MAP: 12  PIP: 30    LIVER FUNCTIONS - ( 2021 04:50 )  Alb: 3.3 g/dL / Pro: 5.6 g/dL / ALK PHOS: 121 U/L / ALT: 24 U/L / AST: 30 U/L / GGT: x           Lactate, Blood: 1.3 mmol/L ( @ 08:56)  Lactate, Blood: 5.5 mmol/L ( @ 03:19)  Lactate, Blood: 1.6 mmol/L ( @ 02:50)     Urinalysis Basic - ( 2021 10:44 )    Color: Yellow / Appearance: Turbid / S.027 / pH: x  Gluc: x / Ketone: Negative  / Bili: Negative / Urobili: <2 mg/dL   Blood: x / Protein: 100 mg/dL / Nitrite: Negative   Leuk Esterase: Large / RBC: 14 /HPF / WBC >720 /HPF   Sq Epi: x / Non Sq Epi: 3 /HPF / Bacteria: Moderate                        7.7    7.41  )-----------( 128      ( 2021 10:44 )             24.3      04-02    138  |  102  |  45<H>  ----------------------------<  92  3.8   |  20  |  4.2<HH>    Ca    8.9      2021 04:50  Mg     2.1     04-02    TPro  5.6<L>  /  Alb  3.3<L>  /  TBili  0.3  /  DBili  x   /  AST  30  /  ALT  24  /  AlkPhos  121<H>  04-02     PT/INR - ( 2021 04:50 )   PT: 15.80 sec;   INR: 1.37 ratio      PTT - ( 2021 04:50 )  PTT:32.6 sec  Adult Advanced Hemodynamics Last 24 Hrs  CVP(mm Hg): --  CVP(cm H2O): --  CO: --  CI: --  PA: --  PA(mean): --  PCWP: --  SVR: --  SVRI: --  PVR: --  PVRI: --  CARDIAC MARKERS ( 2021 04:50 )  x     / 0.21 ng/mL / x     / x     / x      CARDIAC MARKERS ( 2021 23:30 )  x     / 0.24 ng/mL / x     / x     / x      CARDIAC MARKERS ( 2021 18:27 )  x     / 0.22 ng/mL / 34 U/L / x     / x      CARDIAC MARKERS ( 2021 17:18 )  x     / 0.22 ng/mL / x     / x     / x      CARDIAC MARKERS ( 2021 08:56 )  x     / 0.20 ng/mL / x     / x     / 4.8 ng/mL  CARDIAC MARKERS ( 2021 03:19 )  x     / 0.17 ng/mL / x     / x     / x        Diagnostics/ Results:  Last CTH: < from: CT Head No Cont (21 @ 21:57) >  IMPRESSION:    No CT evidence of acute intracranial pathology.    < end of copied text >    Last CTA/MRA: n/a    Last CTP: n/a    Last MRI: n/a    Last TCD: n/a    Last EEG: PENDING     Last Echo: n/a    Last EKG: < from: 12 Lead ECG (21 @ 03:30) >  Diagnosis Line Sinus tachycardia  Left axis deviation  Right bundle branch block  Left ventricular hypertrophy with repolarization abnormality ( R in aVL ,   Romhilt-Redman )  Anterior infarct , age undetermined  Abnormal ECG    < end of copied text >    Chest Xray: < from: Xray Chest 1 View-PORTABLE IMMEDIATE (Xray Chest 1 View-PORTABLE IMMEDIATE .) (21 @ 11:15) >  Impression:    Unchanged bilateral opacities/pleural effusions.  Stable support devices.    < end of copied text >    ROS:  [X] A ten-point ROS was otherwise negative except as noted in HPI    Assessment:  71 yo F with PMH of CAD s/p CABG (2019), HFrEF (EF 20%), AS, ESRD (MWF), bladder prolapse requiring chronic Castellon, Diabetes Type 2 on insulin presents from home s/p mechanical fall. Trauma workup in ED significant for R humeral fracture / R hip fracture / non displaced odontoid fracture/ acute rib fractures/ ? T6 vertebral fracture. Experienced cardiac arrest while in ED, exact down time unknown as pt was not on monitor however ROSC achieved 2 minutes after initiation of CPR. Pt subsequently intubated and sedated, started on pressors. Experienced seizure-like episode while sedation being weaned for possible extubation, consisting of R gaze deviation and clonic jerking of extremities which lasted 5 minutes, stopped after being given 2mg Versed. Experienced one additional episode lasting 1 minute per family, resolved without medication. Initial CTH on arrival negative for acute intracranial pathology, repeat CTH after seizures did not show any changes from prior. Remains intubated and sedated on propofol and levo. Obtain vEEG to r/o electrographic seizures.     Plan:  Neurology:  -Obtain vEEG to r/o electrographic seizures  -C/w Keppra 375mg BID with additional 250mg dose after dialysis  -Seizure precautions  -Neuro checks Q2H    Respiratory:  -Keep HOB > 35; aspiration precautions   -Ventilator management as per respiratory  400/5/100/20  -B/L pleural effusions; daily chest x-rays    Cardiology:  -Titrate levo PRN  - CAD s/p CABG; c/w ASA 81mg  -EKG; L ventricular hypertrophy     Vascular:  -VA duplex LE b/l negative     GI/Nutrition:  -Diet, NPO: Except Medications (21 @ 19:14) [Active]  -Bowel Regimen ->Senna  -PPx -> Pantoprazole   -MRSA +++    / Renal/ Fluids/ Electrolytes:  - Nephrology following; HD  - Monitor electrolytes, keep Mg >2  - Maintain euvolemia     Hematology/ Oncology:  -DVT PPx -> Heparin SQ    Lovenox SQ    Heparin gtt     SCDs  -Continue to trend Hgb/Hct; 9.9 on admission---> 7.7 today    Infectious Disease:  -ABX as per ID; Rocephin   -Droplet precaution; Adeno virus +++     Musculoskeletol:  -Bedrest    Endocrine:  -C/w lipitor     Tubes/ Lines/ Drains:  Central    Peripheral    A-line    Castellon    NGT/ OGT    Chest    EVD    Disposition:  Continue medical management as per primary team in:   ICU   CCU   SICU   Stroke Unit   Stepdown    CODE STATUS w/ Next of Kin:   DNI   DNR   FULL    Patient seen and case discussed with NCC attending; see attending attestation  Please call w/ questions  Indu Vasquez NP  [id number]

## 2021-04-02 NOTE — CHART NOTE - NSCHARTNOTEFT_GEN_A_CORE
RK: Patient was reexamined after reintubation occurred. She was alert and oriented, and following commands. She was able to move all extremities. Her sister was bedside during this examination.

## 2021-04-02 NOTE — PROGRESS NOTE ADULT - ASSESSMENT
Impression:  cardiac arrest unknown etiology   ?? arrythmia   multiple fx , hip, humerus , neck   shock ?? cardiogenic   seizure   HD     PLAN:    CNS: start keppra   EEG   now she is awake hold propofol for weaning trial     HEENT: oral care     PULMONARY: lower rate to 15   weaning trail     CARDIOVASCULAR: cardiology consult   echo   hold bp med for now   taper pressors     GI: GI prophylaxis. npo for extubation     RENAL: HD at bed side     INFECTIOUS DISEASE:   pan cx   procal   d/c cefepime and leonides  start Rocephin     HEMATOLOGICAL:  DVT prophylaxis. follow h/h if dropping ct abd , pelvis no contrast     ENDOCRINE:  Follow up FS.  Insulin protocol if needed.        CRITICAL CARE TIME SPENT: ***

## 2021-04-02 NOTE — PROGRESS NOTE ADULT - SUBJECTIVE AND OBJECTIVE BOX
Patient is a 70y old  Female who presents with a chief complaint of mechanical fall (2021 12:24)      Over Night Events:  Patient seen and examined.   still vented s/p cardiac arrest ?? seizure     ROS:  See HPI    PHYSICAL EXAM    ICU Vital Signs Last 24 Hrs  T(C): 37.6 (2021 04:00), Max: 38.1 (2021 23:23)  T(F): 99.7 (2021 04:00), Max: 100.6 (2021 23:23)  HR: 58 (2021 07:00) (58 - 76)  BP: 135/64 (2021 23:23) (135/64 - 135/64)  BP(mean): 94 (2021 23:23) (94 - 94)  ABP: 136/64 (2021 07:00) (82/50 - 165/77)  ABP(mean): 86 (2021 07:00) (59 - 105)  RR: 19 (2021 07:00) (19 - 20)  SpO2: 100% (2021 07:00) (95% - 100%)      General: Aox3  HEENT:   et tube              Lymph Nodes: NO cervical LN   Lungs: Bilateral BS  Cardiovascular: Regular   Abdomen: Soft, Positive BS  Extremities: No clubbing   Skin: warm   Neurological: no focal deficit   Musculoskeletal: move all ext     I&O's Detail    2021 07:01  -  2021 07:00  --------------------------------------------------------  IN:    Enteral Tube Flush: 150 mL    Norepinephrine: 29.6 mL    Propofol: 142.4 mL  Total IN: 322 mL    OUT:    Indwelling Catheter - Urethral (mL): 30 mL  Total OUT: 30 mL    Total NET: 292 mL          LABS:                          7.7    5.64  )-----------( 141      ( 2021 04:50 )             23.8         2021 04:50    138    |  102    |  45     ----------------------------<  92     3.8     |  20     |  4.2      Ca    8.9        2021 04:50  Mg     2.1       2021 04:50    TPro  5.6    /  Alb  3.3    /  TBili  0.3    /  DBili  x      /  AST  30     /  ALT  24     /  AlkPhos  121    2021 04:50  Amylase x     lipase x                                                 PT/INR - ( 2021 04:50 )   PT: 15.80 sec;   INR: 1.37 ratio         PTT - ( 2021 04:50 )  PTT:32.6 sec                                       Urinalysis Basic - ( 2021 02:50 )    Color: Light Yellow / Appearance: Clear / S.011 / pH: x  Gluc: x / Ketone: Negative  / Bili: Negative / Urobili: <2 mg/dL   Blood: x / Protein: Negative / Nitrite: Negative   Leuk Esterase: Moderate / RBC: 2 /HPF / WBC 8 /HPF   Sq Epi: x / Non Sq Epi: 1 /HPF / Bacteria: Many        Lactate, Blood: 1.3 mmol/L (21 @ 08:56)  Lactate, Blood: 5.5 mmol/L (21 @ 03:19)  Lactate, Blood: 1.6 mmol/L (21 @ 02:50)    CARDIAC MARKERS ( 2021 04:50 )  x     / 0.21 ng/mL / x     / x     / x      CARDIAC MARKERS ( 2021 23:30 )  x     / 0.24 ng/mL / x     / x     / x      CARDIAC MARKERS ( 2021 18:27 )  x     / 0.22 ng/mL / 34 U/L / x     / x      CARDIAC MARKERS ( 2021 17:18 )  x     / 0.22 ng/mL / x     / x     / x      CARDIAC MARKERS ( 2021 08:56 )  x     / 0.20 ng/mL / x     / x     / 4.8 ng/mL  CARDIAC MARKERS ( 2021 03:19 )  x     / 0.17 ng/mL / x     / x     / x                                                                                                         Mode: AC/ CMV (Assist Control/ Continuous Mandatory Ventilation)  RR (machine): 20  TV (machine): 450  FiO2: 40  PEEP: 5  ITime: 1  MAP: 13  PIP: 30                                      ABG - ( 2021 03:47 )  pH, Arterial: 7.55  pH, Blood: x     /  pCO2: 24    /  pO2: 183   / HCO3: 21    / Base Excess: -1.1  /  SaO2: x                   MEDICATIONS  (STANDING):  aMIOdarone    Tablet 200 milliGRAM(s) Oral daily  aspirin  chewable 81 milliGRAM(s) Oral daily  atorvastatin Oral Tab/Cap - Peds 40 milliGRAM(s) Oral daily  cefepime   IVPB 2000 milliGRAM(s) IV Intermittent once  chlorhexidine 0.12% Liquid 15 milliLiter(s) Oral Mucosa every 12 hours  chlorhexidine 4% Liquid 1 Application(s) Topical <User Schedule>  dexMEDEtomidine Infusion 0.2 MICROgram(s)/kG/Hr (3.3 mL/Hr) IV Continuous <Continuous>  fentaNYL   Infusion. 0.5 MICROgram(s)/kG/Hr (3.3 mL/Hr) IV Continuous <Continuous>  heparin   Injectable 5000 Unit(s) SubCutaneous every 12 hours  hydrALAZINE 25 milliGRAM(s) Oral three times a day  meropenem  IVPB      meropenem  IVPB 500 milliGRAM(s) IV Intermittent every 12 hours  metolazone Oral Tab/Cap - Peds 5 milliGRAM(s) Oral daily  norepinephrine Infusion 0.05 MICROgram(s)/kG/Min (6.19 mL/Hr) IV Continuous <Continuous>  pantoprazole  Injectable 40 milliGRAM(s) IV Push daily  propofol Infusion 10 MICROgram(s)/kG/Min (3.96 mL/Hr) IV Continuous <Continuous>  senna 2 Tablet(s) Oral daily  torsemide 80 milliGRAM(s) Oral two times a day    MEDICATIONS  (PRN):          Xrays:  TLC:  OG:  ET tube:                                                                                       ECHO:  [facility] ICU:

## 2021-04-02 NOTE — PROGRESS NOTE ADULT - SUBJECTIVE AND OBJECTIVE BOX
SUBJ: Intubated, awake and following command      MEDICATIONS  (STANDING):  aMIOdarone    Tablet 200 milliGRAM(s) Oral daily  aspirin  chewable 81 milliGRAM(s) Oral daily  atorvastatin Oral Tab/Cap - Peds 40 milliGRAM(s) Oral daily  cefTRIAXone   IVPB 1000 milliGRAM(s) IV Intermittent every 24 hours  chlorhexidine 0.12% Liquid 15 milliLiter(s) Oral Mucosa every 12 hours  chlorhexidine 4% Liquid 1 Application(s) Topical <User Schedule>  dexMEDEtomidine Infusion 0.2 MICROgram(s)/kG/Hr (3.3 mL/Hr) IV Continuous <Continuous>  fentaNYL   Infusion. 0.5 MICROgram(s)/kG/Hr (3.3 mL/Hr) IV Continuous <Continuous>  heparin   Injectable 5000 Unit(s) SubCutaneous every 12 hours  levETIRAcetam  IVPB 375 milliGRAM(s) IV Intermittent every 12 hours  levETIRAcetam  IVPB 250 milliGRAM(s) IV Intermittent <User Schedule>  metolazone Oral Tab/Cap - Peds 5 milliGRAM(s) Oral daily  norepinephrine Infusion 0.05 MICROgram(s)/kG/Min (6.19 mL/Hr) IV Continuous <Continuous>  pantoprazole  Injectable 40 milliGRAM(s) IV Push daily  propofol Infusion 10 MICROgram(s)/kG/Min (3.96 mL/Hr) IV Continuous <Continuous>  senna 2 Tablet(s) Oral daily  torsemide 80 milliGRAM(s) Oral two times a day    MEDICATIONS  (PRN):            Vital Signs Last 24 Hrs  T(C): 37.7 (02 Apr 2021 09:00), Max: 38.1 (01 Apr 2021 23:23)  T(F): 99.9 (02 Apr 2021 09:00), Max: 100.6 (01 Apr 2021 23:23)  HR: 106 (02 Apr 2021 09:00) (58 - 106)  BP: 135/64 (01 Apr 2021 23:23) (135/64 - 135/64)  BP(mean): 94 (01 Apr 2021 23:23) (94 - 94)  RR: 20 (02 Apr 2021 09:00) (19 - 20)  SpO2: 98% (02 Apr 2021 09:00) (98% - 100%)     REVIEW OF SYSTEMS:  CONSTITUTIONAL: No fever, weight loss, or fatigue  CARDIOLOGY: PAtient denies chest pain, shortness of breath or syncopal episodes.   RESPIRATORY: denies shortness of breath, wheezeing.   NEUROLOGICAL: NO weakness, no focal deficits to report.  ENDOCRINOLOGICAL: no recent change in diabetic medications.   GI: no BRBPR, no N,V,diarrhea.    PSYCHIATRY: normal mood and affect  HEENT: no nasal discharge, no ecchymosis  SKIN: no ecchymosis, no breakdown  MUSCULOSKELETAL: Full range of motion x4.        PHYSICAL EXAM:  · CONSTITUTIONAL:	Well-developed, well nourished    BMI-  ·RESPIRATORY:   airway patent; breath sounds equal; good air movement; respirations non-labored; clear to auscultation bilaterally; no chest wall tenderness; no intercostal retractions; no rales,rhonchi or wheeze  · CARDIOVASCULAR	regular rate and rhythm  no rub  no murmur  normal PMI  · EXTREMITIES: No cyanosis, clubbing or edema  · VASCULAR: 	Equal and normal pulses (carotid, femoral, dorsalis pedis)  	  TELEMETRY:    ECG:    TTE:    LABS:                        7.7    5.64  )-----------( 141      ( 02 Apr 2021 04:50 )             23.8     04-02    138  |  102  |  45<H>  ----------------------------<  92  3.8   |  20  |  4.2<HH>    Ca    8.9      02 Apr 2021 04:50  Mg     2.1     04-02    TPro  5.6<L>  /  Alb  3.3<L>  /  TBili  0.3  /  DBili  x   /  AST  30  /  ALT  24  /  AlkPhos  121<H>  04-02    CARDIAC MARKERS ( 02 Apr 2021 04:50 )  x     / 0.21 ng/mL / x     / x     / x      CARDIAC MARKERS ( 01 Apr 2021 23:30 )  x     / 0.24 ng/mL / x     / x     / x      CARDIAC MARKERS ( 01 Apr 2021 18:27 )  x     / 0.22 ng/mL / 34 U/L / x     / x      CARDIAC MARKERS ( 01 Apr 2021 17:18 )  x     / 0.22 ng/mL / x     / x     / x      CARDIAC MARKERS ( 01 Apr 2021 08:56 )  x     / 0.20 ng/mL / x     / x     / 4.8 ng/mL  CARDIAC MARKERS ( 01 Apr 2021 03:19 )  x     / 0.17 ng/mL / x     / x     / x          PT/INR - ( 02 Apr 2021 04:50 )   PT: 15.80 sec;   INR: 1.37 ratio         PTT - ( 02 Apr 2021 04:50 )  PTT:32.6 sec    I&O's Summary    01 Apr 2021 07:01  -  02 Apr 2021 07:00  --------------------------------------------------------  IN: 322 mL / OUT: 30 mL / NET: 292 mL    02 Apr 2021 07:01  -  02 Apr 2021 09:41  --------------------------------------------------------  IN: 160 mL / OUT: 10 mL / NET: 150 mL      BNP  RADIOLOGY & ADDITIONAL STUDIES:    IMPRESSION AND PLAN:    Afib in NSR  HFrEF improved and stable on medical therapy  Echo for EF  Weaning and possible extubation  seizure work up  questionable cardiac arrest, not clear, EP consult  seizures followed by neuro VEEG  Discussed in rounds with MICU team

## 2021-04-02 NOTE — CONSULT NOTE ADULT - SUBJECTIVE AND OBJECTIVE BOX
Patient is a 70y old  Female who presents with a chief complaint of Mechanical fall (2021 13:09)        HPI:  71 yo F with PMH of CAD s/p CABG (2019), HFrEF (EF 20%), AS, ESRD (MWF), bladder prolapse requiring chronic Castellon, Diabetes Type 2 on insulin presents from home s/p mechanical fall. When the history was taken in the emergency department, she The denied any dizziness/ palpitations/ aura/vertigo. She reported non-radiating R shoulder pain and non-radiating R hip pain on arrival. She was found to have R humeral fracture / R hip fracture / non displaced odontoid fracture/ acute rib fractures/ ? T6 vertebral fracture.     In the emergency department, while the patient was receiving trauma work up she was found pulseless by a PCA in the room ( she was not on the monitor at that time) and had a cardiac arrest. ROSC was achieved after 2 minutes of CPR (1 epinephrine was given and 1 calcium chloride iv). Patient was intubated and sedated. EKG showed RBBB with wide complex tachycardia. Per family at the bedside, the patient had her last hemodialysis yesterday and has been relatively non compliant with her medications and medical care at home. Unable to obtain ros as patient is sedated.     T(C): 35 , HR: 60, BP: 158/67, RR: 20, SpO2: 99% vented  Labs: d-dimer ~ 4700, p-bnp 70,000, trop (0.20 <--0.17)  ABG pH, Arterial: 7.45, CO2: 36, Arterial O2: 63 , HCO3: 25 , Base Excess: 1.4 , SaO2: 94      CTH (-)         PAST MEDICAL & SURGICAL HISTORY:  Diabetes    Congestive heart failure (CHF)    Pleural effusion due to congestive heart failure    End stage renal disease  on dialysis Mon, Wed, Fr    Uterine prolapse    Chronic indwelling Castellon catheter    History of repair of hip fracture      S/P CABG (coronary artery bypass graft)  2019    History of thoracentesis    Chronic indwelling Castellon catheter          PREVIOUS DIAGNOSTIC TESTING:      ECHO  FINDINGS:  < from: Transthoracic Echocardiogram (20 @ 07:10) >  Summary:   1. Left ventricular ejection fraction, by visual estimation, is 20 to 25%.   2. Severely decreased global left ventricular systolic function.   3. The mitral valve leaflets are tethered due to reduced systolic function and elevated LVDP.   4. Moderate mitral annular calcification.   5. Moderate-to-severe mitral regurgitation.   6. Peak transaortic gradient equals 25.2 mmHg, mean transaortic gradient equals 8.8 mmHg, the calculated aortic valve area equals 1.07 cm² by the continuity equation consistent with moderate aortic stenosis (possible LFLG).   7. Estimated JENNIFER = 1.24 cm2 by planimetry.   8. Trivial aortic regurgitation.   9. Moderate tricuspid regurgitation.  10. Estimated pulmonary artery systolic pressure is 44.5 mmHg assuming a right atrial pressure of 8 mmHg, which is consistent with mild pulmonary hypertension.  11. LA volume Index is 71.0 ml/m² ml/m2.  12. Bilateral pleural effusions.    < end of copied text >    CATHETERIZATION  FINDINGS:  < from: Cardiac Cath Lab - Adult (19 @ 12:07) >  SUMMARY:         CORONARY CIRCULATION: There was 3-vessel coronary artery disease (LAD, RCA,    and circumflex). Ostial left main: There was a tubular 85 % stenosis at    the ostium of the vessel segment. This is a likely culprit for the    patient's clinical presentation. It appears amenable to percutaneous    intervention. Mid RCA: There was a tubular 95 % stenosis in the proximal    third of the vessel segment. This is a likely culprit for the patient's    clinical presentation. It appears amenable to percutaneous intervention.    < end of copied text >    VENOUS DUPLEX SCAN:  FINDINGS:  < from: [facility] Duplex Lower Ext Vein Scan, Bilat (21 @ 16:13) >  FINDINGS:    RIGHT:  Normal compressibility of the RIGHT common femoral, femoral and popliteal veins.  Doppler examination shows normal spontaneous and phasic flow.  No RIGHT calf vein thrombosis is detected.    LEFT:  Normal compressibility of the LEFT common femoral, femoral and popliteal veins.  Doppler examination shows normal spontaneous and phasic flow.  No LEFT calf vein thrombosis is detected.    IMPRESSION:  No evidence of deep venous thrombosis in either lower extremity.    < end of copied text >    CHEST CT PULMONARY ANGIO with IV Contrast:  FINDINGS:  < from: CT Angio Chest PE Protocol w/ IV Cont (21 @ 06:10) >    IMPRESSION:    No CTA evidence of acute pulmonary embolism.    Acute comminuted fracture of the right humeral neck.    Acute comminuted right intertrochanteric fracture.    Age-indeterminate fracture of T6 vertebral body, new since prior examination.    Multiple bilateral acute and chronic rib fractures.    Bilateral groundglass and consolidative pulmonary opacities, mostly nodular right base. Small bilateral pleural effusions and abdominopelvic ascites. Interlobular septal thickening consistent with pulmonary edema.    Gallbladder distention with cholelithiasis.    < end of copied text >      MEDICATIONS  (STANDING):  aMIOdarone    Tablet 200 milliGRAM(s) Oral daily  aMIOdarone Infusion 1 mG/Min (33.3 mL/Hr) IV Continuous <Continuous>  aspirin  chewable 81 milliGRAM(s) Oral daily  atorvastatin Oral Tab/Cap - Peds 40 milliGRAM(s) Oral daily  cefTRIAXone   IVPB 1000 milliGRAM(s) IV Intermittent every 24 hours  chlorhexidine 0.12% Liquid 15 milliLiter(s) Oral Mucosa every 12 hours  chlorhexidine 4% Liquid 1 Application(s) Topical <User Schedule>  dexMEDEtomidine Infusion 0.2 MICROgram(s)/kG/Hr (3.3 mL/Hr) IV Continuous <Continuous>  epoetin mana-epbx (RETACRIT) Injectable 62695 Unit(s) IV Push <User Schedule>  esMOLOL  Infusion 50 MICROgram(s)/kG/Min (17.4 mL/Hr) IV Continuous <Continuous>  esMOLOL Bolus 75108 MICROGram(s) IV Push once  fentaNYL   Infusion. 0.5 MICROgram(s)/kG/Hr (3.3 mL/Hr) IV Continuous <Continuous>  heparin   Injectable 5000 Unit(s) SubCutaneous every 12 hours  levETIRAcetam  IVPB 375 milliGRAM(s) IV Intermittent every 12 hours  levETIRAcetam  IVPB 250 milliGRAM(s) IV Intermittent <User Schedule>  metolazone Oral Tab/Cap - Peds 5 milliGRAM(s) Oral daily  norepinephrine Infusion 0.05 MICROgram(s)/kG/Min (6.19 mL/Hr) IV Continuous <Continuous>  pantoprazole  Injectable 40 milliGRAM(s) IV Push daily  propofol Infusion 10 MICROgram(s)/kG/Min (3.96 mL/Hr) IV Continuous <Continuous>  senna 2 Tablet(s) Oral daily  torsemide 80 milliGRAM(s) Oral two times a day    MEDICATIONS  (PRN):      FAMILY HISTORY:  FH: stomach cancer (Mother)    FH: myocardial infarction (Mother)        SOCIAL HISTORY:    CIGARETTES:  neg. Former smoker.  ALCOHOL:  neg.    Allergies:    No Known Allergies      REVIEW OF SYSTEMS:  Unable to obtain due to patient's condition    CARDIOVASCULAR: see HPI  GASTROINTESTINAL: No abdominal  or epigastric pain, nausea, vomiting, hematemesis, diarrhea, constipation, melena or bright red blood.  GENITOURINARY: on HD  MUSCULOSKELETAL: see hpi        Vital Signs Last 24 Hrs  T(C): 36.3 (2021 12:00), Max: 38.1 (2021 23:23)  T(F): 97.4 (2021 12:00), Max: 100.6 (2021 23:23)  HR: 62 (2021 16:00) (58 - 174)  BP: 135/64 (2021 23:23) (135/64 - 135/64)  BP(mean): 94 (2021 23:23) (94 - 94)  RR: 20 (2021 16:00) (19 - 31)  SpO2: 100% (2021 16:00) (86% - 100%)    PHYSICAL EXAM:    GENERAL: In no apparent distress, intubated and sedated  HEAD:  Atraumatic, Normocephalic  EYES: EOMI, PERRLA, conjunctiva and sclera clear  NECK: Supple and normal thyroid.  + JVD or carotid bruit.  Carotid pulse is 2+ bilaterally.  HEART: Regular rate and rhythm; 3/6 systolic crescendo-decrescendo murmur at the base.  PULMONARY: Clear to auscultation and perfusion.  No rales, wheezing, or rhonchi bilaterally.  ABDOMEN: Soft, Nontender, Nondistended; Bowel sounds present  EXTREMITIES:  2+ Peripheral Pulses, No clubbing, cyanosis, or edema  LYMPH: No lymphadenopathy noted  NEUROLOGICAL: sedated      INTERPRETATION OF TELEMETRY: WCT irregular tachycardia, RBBB pattern    ECG:   Sinus tachycardia, RBBB, LAD     I&O's Detail    2021 07:01  -  2021 07:00  --------------------------------------------------------  IN:    Enteral Tube Flush: 150 mL    Norepinephrine: 29.6 mL    Propofol: 142.4 mL  Total IN: 322 mL    OUT:    Indwelling Catheter - Urethral (mL): 30 mL  Total OUT: 30 mL    Total NET: 292 mL      2021 07:01  -  2021 16:22  --------------------------------------------------------  IN:    Amiodarone: 166.5 mL    Esmolol: 121.8 mL    IV PiggyBack: 150 mL    Norepinephrine: 35 mL    Propofol: 109.6 mL  Total IN: 582.9 mL    OUT:    Indwelling Catheter - Urethral (mL): 20 mL  Total OUT: 20 mL    Total NET: 562.9 mL          LABS:                        7.7    7.41  )-----------( 128      ( 2021 10:44 )             24.3     04-02    138  |  102  |  45<H>  ----------------------------<  92  3.8   |  20  |  4.2<HH>    Ca    8.9      2021 04:50  Mg     2.1     04-02    TPro  5.6<L>  /  Alb  3.3<L>  /  TBili  0.3  /  DBili  x   /  AST  30  /  ALT  24  /  AlkPhos  121<H>  04-02    CARDIAC MARKERS ( 2021 04:50 )  x     / 0.21 ng/mL / x     / x     / x      CARDIAC MARKERS ( 2021 23:30 )  x     / 0.24 ng/mL / x     / x     / x      CARDIAC MARKERS ( 2021 18:27 )  x     / 0.22 ng/mL / 34 U/L / x     / x      CARDIAC MARKERS ( 2021 17:18 )  x     / 0.22 ng/mL / x     / x     / x      CARDIAC MARKERS ( 2021 08:56 )  x     / 0.20 ng/mL / x     / x     / 4.8 ng/mL  CARDIAC MARKERS ( 2021 03:19 )  x     / 0.17 ng/mL / x     / x     / x          PT/INR - ( 2021 04:50 )   PT: 15.80 sec;   INR: 1.37 ratio         PTT - ( 2021 04:50 )  PTT:32.6 sec  Urinalysis Basic - ( 2021 10:44 )    Color: Yellow / Appearance: Turbid / S.027 / pH: x  Gluc: x / Ketone: Negative  / Bili: Negative / Urobili: <2 mg/dL   Blood: x / Protein: 100 mg/dL / Nitrite: Negative   Leuk Esterase: Large / RBC: 14 /HPF / WBC >720 /HPF   Sq Epi: x / Non Sq Epi: 3 /HPF / Bacteria: Moderate      BNP  I&O's Detail    2021 07:01  -  2021 07:00  --------------------------------------------------------  IN:    Enteral Tube Flush: 150 mL    Norepinephrine: 29.6 mL    Propofol: 142.4 mL  Total IN: 322 mL    OUT:    Indwelling Catheter - Urethral (mL): 30 mL  Total OUT: 30 mL    Total NET: 292 mL      2021 07:01  -  2021 16:22  --------------------------------------------------------  IN:    Amiodarone: 166.5 mL    Esmolol: 121.8 mL    IV PiggyBack: 150 mL    Norepinephrine: 35 mL    Propofol: 109.6 mL  Total IN: 582.9 mL    OUT:    Indwelling Catheter - Urethral (mL): 20 mL  Total OUT: 20 mL    Total NET: 562.9 mL        Daily Height in cm: 157.48 (2021 23:23)    Daily     RADIOLOGY & ADDITIONAL STUDIES:

## 2021-04-02 NOTE — PROGRESS NOTE ADULT - SUBJECTIVE AND OBJECTIVE BOX
HPI  Patient is a 70y old Female who presents with a chief complaint of Fall (2021 09:40)    Currently admitted to medicine with the primary diagnosis of Cardiopulmonary arrest       Today is hospital day 1d.     INTERVAL HPI / OVERNIGHT EVENTS:  Patient was examined and seen at bedside. Patient is sedated and intubated. Yesterday in the afternoon, patient had a failed trail extubation in which she became unresponsive and began seizing. 2mg of versed was administered Patient then became hypotensive 30/20s and 60mcg of IV phenylephrine was given with improvement in bp. Levo was titrated down and propofol was started for sedation.     ROS: Otherwise unremarkable     PAST MEDICAL & SURGICAL HISTORY  Diabetes    Congestive heart failure (CHF)    Pleural effusion due to congestive heart failure    End stage renal disease  on dialysis Mon, Wed, Fr    Uterine prolapse    Chronic indwelling Castellon catheter    History of repair of hip fracture      S/P CABG (coronary artery bypass graft)  2019    History of thoracentesis    Chronic indwelling Castellon catheter      ALLERGIES  No Known Allergies    MEDICATIONS  STANDING MEDICATIONS  aMIOdarone    Tablet 200 milliGRAM(s) Oral daily  aMIOdarone Infusion 1 mG/Min IV Continuous <Continuous>  aspirin  chewable 81 milliGRAM(s) Oral daily  atorvastatin Oral Tab/Cap - Peds 40 milliGRAM(s) Oral daily  cefTRIAXone   IVPB 1000 milliGRAM(s) IV Intermittent every 24 hours  chlorhexidine 0.12% Liquid 15 milliLiter(s) Oral Mucosa every 12 hours  chlorhexidine 4% Liquid 1 Application(s) Topical <User Schedule>  dexMEDEtomidine Infusion 0.2 MICROgram(s)/kG/Hr IV Continuous <Continuous>  esMOLOL  Infusion 50 MICROgram(s)/kG/Min IV Continuous <Continuous>  esMOLOL Bolus 74477 MICROGram(s) IV Push once  fentaNYL   Infusion. 0.5 MICROgram(s)/kG/Hr IV Continuous <Continuous>  heparin   Injectable 5000 Unit(s) SubCutaneous every 12 hours  levETIRAcetam  IVPB 375 milliGRAM(s) IV Intermittent every 12 hours  levETIRAcetam  IVPB 250 milliGRAM(s) IV Intermittent <User Schedule>  metolazone Oral Tab/Cap - Peds 5 milliGRAM(s) Oral daily  norepinephrine Infusion 0.05 MICROgram(s)/kG/Min IV Continuous <Continuous>  pantoprazole  Injectable 40 milliGRAM(s) IV Push daily  propofol Infusion 10 MICROgram(s)/kG/Min IV Continuous <Continuous>  senna 2 Tablet(s) Oral daily  torsemide 80 milliGRAM(s) Oral two times a day    PRN MEDICATIONS    VITALS:  T(F): 99.9  HR: 62  BP: 135/64  RR: 19  SpO2: 100%    PHYSICAL EXAM  GEN: NAD, Resting comfortably in bed  PULM: Clear to auscultation bilaterally, No wheezes  CVS: Regular rate and rhythm, S1-S2, no murmurs  ABD: Soft, non-tender, non-distended, no guarding  EXT: No edema  NEURO: A&Ox3, no focal deficits    LABS                        7.7    5.64  )-----------( 141      ( 2021 04:50 )             23.8     04-    138  |  102  |  45<H>  ----------------------------<  92  3.8   |  20  |  4.2<HH>    Ca    8.9      2021 04:50  Mg     2.1     04-    TPro  5.6<L>  /  Alb  3.3<L>  /  TBili  0.3  /  DBili  x   /  AST  30  /  ALT  24  /  AlkPhos  121<H>  04-02    PT/INR - ( 2021 04:50 )   PT: 15.80 sec;   INR: 1.37 ratio         PTT - ( 2021 04:50 )  PTT:32.6 sec  Urinalysis Basic - ( 2021 10:44 )    Color: Yellow / Appearance: Turbid / S.027 / pH: x  Gluc: x / Ketone: Negative  / Bili: Negative / Urobili: <2 mg/dL   Blood: x / Protein: 100 mg/dL / Nitrite: Negative   Leuk Esterase: Large / RBC: x / WBC x   Sq Epi: x / Non Sq Epi: x / Bacteria: x      ABG - ( 2021 11:03 )  pH, Arterial: 7.40  pH, Blood: x     /  pCO2: 31    /  pO2: 394   / HCO3: 19    / Base Excess: -5.0  /  SaO2: 100               Troponin T, Serum: 0.21 ng/mL *HH* (21 @ 04:50)  Troponin T, Serum: 0.24 ng/mL *HH* (21 @ 23:30)  Troponin T, Serum: 0.22 ng/mL *HH* (21 @ 18:27)  Creatine Kinase, Serum: 34 U/L (21 @ 18:27)  Troponin T, Serum: 0.22 ng/mL *HH* (21 @ 17:18)      CARDIAC MARKERS ( 2021 04:50 )  x     / 0.21 ng/mL / x     / x     / x      CARDIAC MARKERS ( 2021 23:30 )  x     / 0.24 ng/mL / x     / x     / x      CARDIAC MARKERS ( 2021 18:27 )  x     / 0.22 ng/mL / 34 U/L / x     / x      CARDIAC MARKERS ( 2021 17:18 )  x     / 0.22 ng/mL / x     / x     / x      CARDIAC MARKERS ( 2021 08:56 )  x     / 0.20 ng/mL / x     / x     / 4.8 ng/mL  CARDIAC MARKERS ( 2021 03:19 )  x     / 0.17 ng/mL / x     / x     / x          RADIOLOGY  Assessment:    69 y/o F with a pmhx of CAD s/p CABG (2019), HFrEF (EF 20%), AS, ESRD (MWF), bladder prolapse requiring chronic Castellon, Diabetes Type 2 on insulin presents from home s/p mechanical fall. Trauma workup in ED significant for R humeral fracture / R hip fracture / non displaced odontoid fracture/ acute rib fractures/ ? T6 vertebral fracture. In the ED patient arrested, ROSC achieved after 2 minutes of CPR. EKG at the time showed RBBB with wide complex tachy. Pt subsequently intubated and sedated, started on pressors. Pt Experienced seizure-like episode during weaning trial s/p 2mg Versed.  Weaning trial passed. Patient was Extubated . 02 sat began to decrease to low 80s, tachypenic and cyanotic started on bipap with no improvement. Patient again became hypotensive and tachy. Went into Afib. s/p cardioversion x3 and started on amio drip and levo     #Cardiac arrest in the setting of arythmia and CHF exacerbation   - Trops .22-->.24 -->.21  - CTA negative for PE   - Lower extremity duplex negative for DVT   - Echo   - EP consult   - Pending Pro katelyn and blood cultures   - D/C Cef and Juliette       # Seizure unknown etiology  - Keppra 500mg adminstered. followed by 375mg BID with additional 250mg dose after dialysis  - CT Head negative   - Neuro following patient. Plan for VEEG today.      # RT Humerus/Rt femur fracture   - Ortho following  - Recs :  Ortho will continue to monitor patient's overall status, with plan for surgical intervention dependent on patient's status/improvement, and discussion with family/NOK and/or patient if able. Pain control PRN. NWB RLE and RUE.  - Apply arm sling to RUE for comfort    #Odontoid fracture   - Neurosurgery following patient     #ESRD  - Nephro following patient  - Not dialyzing today. Plan for tomorrow     # h/o DM   - fingersticks tid  - hold home insulin  - insulin protocol if > 180    # DVT PPX: heparin subq  # GI PPX: PPI  # Diet: NPO   # Activity: bedrest  # Bowel regimen

## 2021-04-02 NOTE — CONSULT NOTE ADULT - ASSESSMENT
IMPRESSION:     Paroxysmal AF w/RVR s/p DCCV and amiodarone drip, can not rule out VT as the cause of cardiac arrest => in NSR now CHADSVASc 5  Hx of GI bleed  NUBIA clip   Acute decompensated CHF  Ischemic CMP  CAD s/p CABG in 2019  Severe AS  Moderate to severe MR   ESRD on HD     Recommendations:   Monitor in CCU   Finish amiodarone drip, switch to  q12h thereafter  D/C esmolol drip, restart metoprolol 25 daily when BP permits  HD per nephrology and diuresis, keep I<O, daily weights  Consider trial of Eliquis 2.5 mg q12h for thromboembolic prevention  Will need evaluation for AICD once stable

## 2021-04-02 NOTE — PROGRESS NOTE ADULT - ATTENDING COMMENTS
History, events, data and scans reviewed. Patient examined at bedside on 04/02/2021. I agree and approves plan of care as outlined above.  A brief period of cardiac arrest after non-neurologic trauma (04/01), patient recovered consciousness:  Patient is unlikely to have neurologic insult to produce post anoxic or post traumatic seizures. We will suggest VEEG monitoring to further stratify risk.

## 2021-04-02 NOTE — PROGRESS NOTE ADULT - SUBJECTIVE AND OBJECTIVE BOX
[facility] FOLLOW UP NOTE  --------------------------------------------------------------------------------  24 hour events/subjective: Patient examined. Extubated this am then developed AFib with RVR requiring DC cardioversion. Intubated again.    PAST HISTORY  --------------------------------------------------------------------------------  No significant changes to PMH, PSH, FHx, SHx, unless otherwise noted    ALLERGIES & MEDICATIONS  --------------------------------------------------------------------------------  Allergies    No Known Allergies    Standing Inpatient Medications  aMIOdarone    Tablet 200 milliGRAM(s) Oral daily  aMIOdarone Infusion 1 mG/Min IV Continuous <Continuous>  aspirin  chewable 81 milliGRAM(s) Oral daily  atorvastatin Oral Tab/Cap - Peds 40 milliGRAM(s) Oral daily  cefTRIAXone   IVPB 1000 milliGRAM(s) IV Intermittent every 24 hours  chlorhexidine 0.12% Liquid 15 milliLiter(s) Oral Mucosa every 12 hours  chlorhexidine 4% Liquid 1 Application(s) Topical <User Schedule>  dexMEDEtomidine Infusion 0.2 MICROgram(s)/kG/Hr IV Continuous <Continuous>  esMOLOL  Infusion 50 MICROgram(s)/kG/Min IV Continuous <Continuous>  esMOLOL Bolus 56526 MICROGram(s) IV Push once  fentaNYL   Infusion. 0.5 MICROgram(s)/kG/Hr IV Continuous <Continuous>  heparin   Injectable 5000 Unit(s) SubCutaneous every 12 hours  levETIRAcetam  IVPB 375 milliGRAM(s) IV Intermittent every 12 hours  levETIRAcetam  IVPB 250 milliGRAM(s) IV Intermittent <User Schedule>  metolazone Oral Tab/Cap - Peds 5 milliGRAM(s) Oral daily  norepinephrine Infusion 0.05 MICROgram(s)/kG/Min IV Continuous <Continuous>  pantoprazole  Injectable 40 milliGRAM(s) IV Push daily  propofol Infusion 10 MICROgram(s)/kG/Min IV Continuous <Continuous>  senna 2 Tablet(s) Oral daily  torsemide 80 milliGRAM(s) Oral two times a day    PRN Inpatient Medications      VITALS/PHYSICAL EXAM  --------------------------------------------------------------------------------  T(C): 36.3 (04-02-21 @ 12:00), Max: 38.1 (04-01-21 @ 23:23)  HR: 62 (04-02-21 @ 12:00) (58 - 174)  BP: 135/64 (04-01-21 @ 23:23) (135/64 - 135/64)  RR: 20 (04-02-21 @ 12:00) (19 - 31)  SpO2: 100% (04-02-21 @ 12:00) (86% - 100%)  Wt(kg): --  Height (cm): 157.5 (04-01-21 @ 23:23)  Weight (kg): 58 (04-01-21 @ 23:23)  BMI (kg/m2): 23.4 (04-01-21 @ 23:23)  BSA (m2): 1.58 (04-01-21 @ 23:23)      04-01-21 @ 07:01  -  04-02-21 @ 07:00  --------------------------------------------------------  IN: 322 mL / OUT: 30 mL / NET: 292 mL    04-02-21 @ 07:01  -  04-02-21 @ 13:40  --------------------------------------------------------  IN: 307.7 mL / OUT: 20 mL / NET: 287.7 mL      Physical Exam:  	Gen: Intubated  	Pulm:  B/L scant rales  	CV: RRR, S1S2  	Abd: +BS, soft, nondistended  	LE: Warm,  edema  	Vascular access: TDC    LABS/STUDIES  --------------------------------------------------------------------------------              7.7    7.41  >-----------<  128      [04-02-21 @ 10:44]              24.3     138  |  102  |  45  ----------------------------<  92      [04-02-21 @ 04:50]  3.8   |  20  |  4.2        Ca     8.9     [04-02-21 @ 04:50]      Mg     2.1     [04-02-21 @ 04:50]    TPro  5.6  /  Alb  3.3  /  TBili  0.3  /  DBili  x   /  AST  30  /  ALT  24  /  AlkPhos  121  [04-02-21 @ 04:50]    PT/INR: PT 15.80, INR 1.37       [04-02-21 @ 04:50]  PTT: 32.6       [04-02-21 @ 04:50]    Troponin 0.21      [04-02-21 @ 04:50]  CK 34      [04-01-21 @ 18:27]    Creatinine Trend:  SCr 4.2 [04-02 @ 04:50]  SCr 3.8 [04-01 @ 18:27]  SCr 3.6 [04-01 @ 08:56]  SCr 3.5 [04-01 @ 03:19]  SCr 3.2 [04-01 @ 02:50]    Urinalysis - [04-02-21 @ 10:44]      Color Yellow / Appearance Turbid / SG 1.027 / pH 7.0      Gluc Negative / Ketone Negative  / Bili Negative / Urobili <2 mg/dL       Blood Moderate / Protein 100 mg/dL / Leuk Est Large / Nitrite Negative      RBC 14 / WBC >720 / Hyaline 16 / Gran  / Sq Epi  / Non Sq Epi 3 / Bacteria Moderate    Iron 55, TIBC 167, %sat 33      [05-27-20 @ 13:22]  Ferritin 636      [05-27-20 @ 13:22]  HbA1c 5.7      [11-09-19 @ 01:20]

## 2021-04-03 NOTE — DIETITIAN INITIAL EVALUATION ADULT. - ADD RECOMMEND
if pt remains intubated consider initiating the above EN regimen vs diet advancement as medically feasible if extubated.

## 2021-04-03 NOTE — DIETITIAN INITIAL EVALUATION ADULT. - PHYSCIAL ASSESSMENT
Per previous admission records, pt wt 58.1 kg (3/3/2021) vs 58 kg (4/1/2021), which indicates a stable wt at this time.    No edema noted; skin WDL (4/2) well nourished/other (specify)

## 2021-04-03 NOTE — DIETITIAN INITIAL EVALUATION ADULT. - ORAL INTAKE PTA/DIET HISTORY
As per previous admission (assessment on 3/9)- good appetite and po intake @ baseline, NKFA/intolerances, no cultural/Buddhist food preferences.

## 2021-04-03 NOTE — PROGRESS NOTE ADULT - SUBJECTIVE AND OBJECTIVE BOX
INTERVAL HPI/OVERNIGHT EVENTS:  no events on tele  being dyalized  vEEG for stroke  +Adenovirus, +MRSA nares      MEDICATIONS  (STANDING):  aMIOdarone    Tablet 200 milliGRAM(s) Oral daily  aMIOdarone Infusion 1 mG/Min (33.3 mL/Hr) IV Continuous <Continuous>  aspirin  chewable 81 milliGRAM(s) Oral daily  atorvastatin Oral Tab/Cap - Peds 40 milliGRAM(s) Oral daily  cefTRIAXone   IVPB 1000 milliGRAM(s) IV Intermittent every 24 hours  chlorhexidine 0.12% Liquid 15 milliLiter(s) Oral Mucosa every 12 hours  chlorhexidine 4% Liquid 1 Application(s) Topical <User Schedule>  dexMEDEtomidine Infusion 0.2 MICROgram(s)/kG/Hr (3.3 mL/Hr) IV Continuous <Continuous>  epoetin mana-epbx (RETACRIT) Injectable 93076 Unit(s) IV Push <User Schedule>  fentaNYL   Infusion. 0.5 MICROgram(s)/kG/Hr (3.3 mL/Hr) IV Continuous <Continuous>  levETIRAcetam  IVPB 375 milliGRAM(s) IV Intermittent every 12 hours  levETIRAcetam  IVPB 250 milliGRAM(s) IV Intermittent <User Schedule>  metolazone Oral Tab/Cap - Peds 5 milliGRAM(s) Oral daily  norepinephrine Infusion 0.05 MICROgram(s)/kG/Min (6.19 mL/Hr) IV Continuous <Continuous>  pantoprazole  Injectable 40 milliGRAM(s) IV Push daily  propofol Infusion 10 MICROgram(s)/kG/Min (3.96 mL/Hr) IV Continuous <Continuous>  senna 2 Tablet(s) Oral daily  torsemide 80 milliGRAM(s) Oral two times a day    MEDICATIONS  (PRN):      Allergies    No Known Allergies    Intolerances        REVIEW OF SYSTEMS    [ ] A ten-point review of systems was otherwise negative except as noted.  [x ] Due to altered mental status/intubation, subjective information were not able to be obtained from the patient. History was obtained, to the extent possible, from review of the chart and collateral sources of information.      Vital Signs Last 24 Hrs  T(C): 36.7 (2021 04:00), Max: 36.7 (2021 16:00)  T(F): 98 (2021 04:00), Max: 98 (2021 16:00)  HR: 70 (2021 12:00) (56 - 70)  BP: --  BP(mean): --  RR: 20 (2021 12:00) (19 - 20)  SpO2: 100% (2021 12:00) (98% - 100%)    21 @ 07:01  -  21 @ 07:00  --------------------------------------------------------  IN: 1205.8 mL / OUT: 59 mL / NET: 1146.8 mL    21 @ 07:01  -  21 @ 13:11  --------------------------------------------------------  IN: 256.8 mL / OUT: 43 mL / NET: 213.8 mL        PHYSICAL EXAM:    GENERAL/Neuro: In no apparent distress, intubated/sedated  HEART: Regular rate and rhythm; 2/6 KIKI rsb, 3/6 SM lsb, NO rubs, or gallops.  PULMONARY: Clear to auscultation and percussion.  Normal expansion/effort. No rales, wheezing, or rhonchi bilaterally.  ABDOMEN: Soft, Nontender, Nondistended; Bowel sounds present  EXTREMITIES:  Extremities warm, pink, well-perfused, 2+ Peripheral Pulses, No clubbing, cyanosis, or edema  NEUROLOGICAL: alert & oriented x 3, no focal deficits, PERRLA, EOMI    LABS:                        6.5    4.60  )-----------( 115      ( 2021 10:50 )             20.6     04-03    137  |  99  |  51<H>  ----------------------------<  103<H>  3.9   |  17  |  4.9<HH>    Ca    8.7      2021 04:50  Phos  6.0     04-03  Mg     2.2     04-03    TPro  5.6<L>  /  Alb  3.1<L>  /  TBili  0.3  /  DBili  x   /  AST  19  /  ALT  17  /  AlkPhos  115  04-03    PT/INR - ( 2021 04:50 )   PT: 15.80 sec;   INR: 1.37 ratio         PTT - ( 2021 04:50 )  PTT:32.6 sec  Urinalysis Basic - ( 2021 10:44 )    Color: Yellow / Appearance: Turbid / S.027 / pH: x  Gluc: x / Ketone: Negative  / Bili: Negative / Urobili: <2 mg/dL   Blood: x / Protein: 100 mg/dL / Nitrite: Negative   Leuk Esterase: Large / RBC: 14 /HPF / WBC >720 /HPF   Sq Epi: x / Non Sq Epi: 3 /HPF / Bacteria: Moderate        12 Lead ECG:   Ventricular Rate 62 BPM    Atrial Rate 62 BPM    P-R Interval 206 ms    QRS Duration 170 ms    Q-T Interval 502 ms    QTC Calculation(Bazett) 509 ms    P Axis 92 degrees    R Axis -53 degrees    T Axis 131 degrees    Diagnosis Line Normal sinus rhythm  Right bundle branch block  Left anterior fascicular block  Left ventricular hypertrophy with repolarization abnormality  Abnormal ECG    Confirmed by Sapphire Burns MD ([id number]) on 4/3/2021 9:30:23 AM (21 @ 05:36)      RADIOLOGY & ADDITIONAL TESTS:       INTERVAL HPI/OVERNIGHT EVENTS:  no events on tele  being dyalized  vEEG for stroke  +Adenovirus, +MRSA nares      MEDICATIONS  (STANDING):  aMIOdarone    Tablet 200 milliGRAM(s) Oral daily  aMIOdarone Infusion 1 mG/Min (33.3 mL/Hr) IV Continuous <Continuous>  aspirin  chewable 81 milliGRAM(s) Oral daily  atorvastatin Oral Tab/Cap - Peds 40 milliGRAM(s) Oral daily  cefTRIAXone   IVPB 1000 milliGRAM(s) IV Intermittent every 24 hours  chlorhexidine 0.12% Liquid 15 milliLiter(s) Oral Mucosa every 12 hours  chlorhexidine 4% Liquid 1 Application(s) Topical <User Schedule>  dexMEDEtomidine Infusion 0.2 MICROgram(s)/kG/Hr (3.3 mL/Hr) IV Continuous <Continuous>  epoetin mana-epbx (RETACRIT) Injectable 61073 Unit(s) IV Push <User Schedule>  fentaNYL   Infusion. 0.5 MICROgram(s)/kG/Hr (3.3 mL/Hr) IV Continuous <Continuous>  levETIRAcetam  IVPB 375 milliGRAM(s) IV Intermittent every 12 hours  levETIRAcetam  IVPB 250 milliGRAM(s) IV Intermittent <User Schedule>  metolazone Oral Tab/Cap - Peds 5 milliGRAM(s) Oral daily  norepinephrine Infusion 0.05 MICROgram(s)/kG/Min (6.19 mL/Hr) IV Continuous <Continuous>  pantoprazole  Injectable 40 milliGRAM(s) IV Push daily  propofol Infusion 10 MICROgram(s)/kG/Min (3.96 mL/Hr) IV Continuous <Continuous>  senna 2 Tablet(s) Oral daily  torsemide 80 milliGRAM(s) Oral two times a day    MEDICATIONS  (PRN):      Allergies    No Known Allergies    Intolerances        REVIEW OF SYSTEMS    [ ] A ten-point review of systems was otherwise negative except as noted.  [x ] Due to altered mental status/intubation, subjective information were not able to be obtained from the patient. History was obtained, to the extent possible, from review of the chart and collateral sources of information.      Vital Signs Last 24 Hrs  T(C): 36.7 (2021 04:00), Max: 36.7 (2021 16:00)  T(F): 98 (2021 04:00), Max: 98 (2021 16:00)  HR: 70 (2021 12:00) (56 - 70)  BP: --  BP(mean): --  RR: 20 (2021 12:00) (19 - 20)  SpO2: 100% (2021 12:00) (98% - 100%)    21 @ 07:01  -  21 @ 07:00  --------------------------------------------------------  IN: 1205.8 mL / OUT: 59 mL / NET: 1146.8 mL    21 @ 07:01  -  21 @ 13:11  --------------------------------------------------------  IN: 256.8 mL / OUT: 43 mL / NET: 213.8 mL        PHYSICAL EXAM:    GENERALNeuro: intubated/sedated, RASS -2, withdraw all 4 extr to painful stimuli, .PERRLA  HEART: Regular rate and rhythm; No murmur; NO rubs, or gallops.  PULMONARY: Clear to auscultation and percussion.  Normal expansion/effort. No rales, wheezing, or rhonchi bilaterally.  ABDOMEN: Soft, Nontender, Nondistended; Bowel sounds present  EXTREMITIES:  Extremities warm, pink, well-perfused, 2+ Peripheral Pulses, No clubbing, cyanosis, or edema    LABS:                        6.5    4.60  )-----------( 115      ( 2021 10:50 )             20.6     04-03    137  |  99  |  51<H>  ----------------------------<  103<H>  3.9   |  17  |  4.9<HH>    Ca    8.7      2021 04:50  Phos  6.0     04-03  Mg     2.2     04-03    TPro  5.6<L>  /  Alb  3.1<L>  /  TBili  0.3  /  DBili  x   /  AST  19  /  ALT  17  /  AlkPhos  115  04-03    PT/INR - ( 2021 04:50 )   PT: 15.80 sec;   INR: 1.37 ratio         PTT - ( 2021 04:50 )  PTT:32.6 sec  Urinalysis Basic - ( 2021 10:44 )    Color: Yellow / Appearance: Turbid / S.027 / pH: x  Gluc: x / Ketone: Negative  / Bili: Negative / Urobili: <2 mg/dL   Blood: x / Protein: 100 mg/dL / Nitrite: Negative   Leuk Esterase: Large / RBC: 14 /HPF / WBC >720 /HPF   Sq Epi: x / Non Sq Epi: 3 /HPF / Bacteria: Moderate        12 Lead ECG:   Ventricular Rate 62 BPM    Atrial Rate 62 BPM    P-R Interval 206 ms    QRS Duration 170 ms    Q-T Interval 502 ms    QTC Calculation(Bazett) 509 ms    P Axis 92 degrees    R Axis -53 degrees    T Axis 131 degrees    Diagnosis Line Normal sinus rhythm  Right bundle branch block  Left anterior fascicular block  Left ventricular hypertrophy with repolarization abnormality  Abnormal ECG    Confirmed by Sapphire Burns MD ([id number]) on 4/3/2021 9:30:23 AM (21 @ 05:36)      RADIOLOGY & ADDITIONAL TESTS:

## 2021-04-03 NOTE — PROGRESS NOTE ADULT - ATTENDING COMMENTS
I have personally seen and examined this patient on 4/3.  I have fully participated in the care of this patient.  I have reviewed all pertinent clinical information, including history, physical exam, plan and note. Patient remains intubated sedated. VEEG showed metabolic encephalopathy and bilateral sharps. No seizure. Continue Keppra. Patient response minimally to noxious stimuli. Recommend wean down sedation.  I have reviewed all pertinent clinical information and reviewed all relevant imaging and diagnostic studies personally.  Recommendations as above.  Agree with above assessment except as noted.

## 2021-04-03 NOTE — PROGRESS NOTE ADULT - ASSESSMENT
1. ESRD on HD, HD ongoing, tolerated well, 3 hours, 2 k bath, epo IV,  UF as tolerated,  pressor as needed,    2, s/p arrest, CAD, on vent and pressor.    3,  s/p fall , Odontoid fracture. Neurosurgery following, R hip / R humerus fracture. Ortho following.    poor prognosis  d/w CCU staff and HD nurse   1. ESRD on HD, HD ongoing, tolerated well, 3 hours, 2 k bath, epo IV,  UF as tolerated,  pressor as needed,    2, s/p arrest, CAD, on vent and pressor.    3,  s/p fall , Odontoid fracture. Neurosurgery following, R hip / R humerus fracture. Ortho following.    poor prognosis  d/w CCU staff and HD nurse      pt seen 2nd time to eval pt, on EEG monitor, tolerated UF ok, more epo for anemia. d/w HD nurse

## 2021-04-03 NOTE — PROGRESS NOTE ADULT - SUBJECTIVE AND OBJECTIVE BOX
[facility] FOLLOW UP NOTE  --------------------------------------------------------------------------------  Chief Complaint/24 hour events/subjective:    pt seen in HD, on vent and pressor    PAST HISTORY  --------------------------------------------------------------------------------  No significant changes to PMH, PSH, FHx, SHx, unless otherwise noted    ALLERGIES & MEDICATIONS  --------------------------------------------------------------------------------  Allergies    No Known Allergies    Intolerances      Standing Inpatient Medications  aMIOdarone    Tablet 200 milliGRAM(s) Oral daily  aMIOdarone Infusion 1 mG/Min IV Continuous <Continuous>  aspirin  chewable 81 milliGRAM(s) Oral daily  atorvastatin Oral Tab/Cap - Peds 40 milliGRAM(s) Oral daily  cefTRIAXone   IVPB 1000 milliGRAM(s) IV Intermittent every 24 hours  chlorhexidine 0.12% Liquid 15 milliLiter(s) Oral Mucosa every 12 hours  chlorhexidine 4% Liquid 1 Application(s) Topical <User Schedule>  dexMEDEtomidine Infusion 0.2 MICROgram(s)/kG/Hr IV Continuous <Continuous>  epoetin mana-epbx (RETACRIT) Injectable 22103 Unit(s) IV Push <User Schedule>  fentaNYL   Infusion. 0.5 MICROgram(s)/kG/Hr IV Continuous <Continuous>  levETIRAcetam  IVPB 375 milliGRAM(s) IV Intermittent every 12 hours  levETIRAcetam  IVPB 250 milliGRAM(s) IV Intermittent <User Schedule>  metolazone Oral Tab/Cap - Peds 5 milliGRAM(s) Oral daily  norepinephrine Infusion 0.05 MICROgram(s)/kG/Min IV Continuous <Continuous>  pantoprazole  Injectable 40 milliGRAM(s) IV Push daily  propofol Infusion 10 MICROgram(s)/kG/Min IV Continuous <Continuous>  senna 2 Tablet(s) Oral daily  torsemide 80 milliGRAM(s) Oral two times a day    PRN Inpatient Medications      REVIEW OF SYSTEMS  --------------------------------------------------------------------------------    All other systems were reviewed and are negative, except as noted.    VITALS/PHYSICAL EXAM  --------------------------------------------------------------------------------  T(C): 36.7 (04-03-21 @ 04:00), Max: 36.7 (04-02-21 @ 16:00)  HR: 60 (04-03-21 @ 09:00) (56 - 174)  BP: --  RR: 20 (04-03-21 @ 09:00) (19 - 31)  SpO2: 100% (04-03-21 @ 09:00) (86% - 100%)  Wt(kg): --  Height (cm): 157.5 (04-01-21 @ 23:23)  Weight (kg): 58 (04-01-21 @ 23:23)  BMI (kg/m2): 23.4 (04-01-21 @ 23:23)  BSA (m2): 1.58 (04-01-21 @ 23:23)      04-02-21 @ 07:01  -  04-03-21 @ 07:00  --------------------------------------------------------  IN: 1204.8 mL / OUT: 59 mL / NET: 1145.8 mL      Physical Exam:    	Gen: no distress   	Pulm: CTA B/L  	CV: S1S2; no rub  	Abd: +BS, soft, nontender/nondistended  	LE:  no  edema      LABS/STUDIES  --------------------------------------------------------------------------------              7.1    5.74  >-----------<  118      [04-03-21 @ 04:50]              21.4     137  |  99  |  51  ----------------------------<  103      [04-03-21 @ 04:50]  3.9   |  17  |  4.9        Ca     8.7     [04-03-21 @ 04:50]      Mg     2.2     [04-03-21 @ 04:50]      Phos  6.0     [04-03-21 @ 04:50]    TPro  5.6  /  Alb  3.1  /  TBili  0.3  /  DBili  x   /  AST  19  /  ALT  17  /  AlkPhos  115  [04-03-21 @ 04:50]    PT/INR: PT 15.80, INR 1.37       [04-02-21 @ 04:50]  PTT: 32.6       [04-02-21 @ 04:50]    Troponin 0.21      [04-02-21 @ 04:50]  CK 34      [04-01-21 @ 18:27]    Creatinine Trend:  SCr 4.9 [04-03 @ 04:50]  SCr 4.2 [04-02 @ 04:50]  SCr 3.8 [04-01 @ 18:27]  SCr 3.6 [04-01 @ 08:56]  SCr 3.5 [04-01 @ 03:19]    Urinalysis - [04-02-21 @ 10:44]      Color Yellow / Appearance Turbid / SG 1.027 / pH 7.0      Gluc Negative / Ketone Negative  / Bili Negative / Urobili <2 mg/dL       Blood Moderate / Protein 100 mg/dL / Leuk Est Large / Nitrite Negative      RBC 14 / WBC >720 / Hyaline 16 / Gran  / Sq Epi  / Non Sq Epi 3 / Bacteria Moderate      Iron 55, TIBC 167, %sat 33      [05-27-20 @ 13:22]  Ferritin 636      [05-27-20 @ 13:22]  HbA1c 5.7      [11-09-19 @ 01:20]

## 2021-04-03 NOTE — DIETITIAN INITIAL EVALUATION ADULT. - ENTERAL
If pt remains intubated, consider initiating the following EN regimen as medically feasible: Vital HP total volume 840 mL/d. Continuous GOAL rate of 35 mL/h. Order no carb prosource 1x/d (additional 40 kcal, 11 g protein). At goal, this would provide: 1247 kcal (includes 367 kcal from propofol infusion), 85 g protein and 706 mL free H2O. Additional fluids per LIP

## 2021-04-03 NOTE — PROGRESS NOTE ADULT - SUBJECTIVE AND OBJECTIVE BOX
Patient is a 70y old  Female who presents with a chief complaint of Fall (2021 16:21)        Over Night Events:  no acute events overnight  remains intubated   on propofol       ROS:     All pertinent ROS are negative except HPI         PHYSICAL EXAM    ICU Vital Signs Last 24 Hrs  T(C): 36.7 (2021 04:00), Max: 36.7 (2021 16:00)  T(F): 98 (2021 04:00), Max: 98 (2021 16:00)  HR: 64 (2021 08:50) (56 - 174)  BP: --  BP(mean): --  ABP: 127/54 (2021 08:50) (52/40 - 202/102)  ABP(mean): 62 (2021 08:00) (44 - 134)  RR: 20 (2021 08:50) (19 - 31)  SpO2: 100% (2021 08:50) (86% - 100%)      CONSTITUTIONAL:   Ill appearing.  NAD    ENT:   Airway patent,   + et tube    EYES:   Pupils equal,   Round and reactive to light.    CARDIAC:   Normal rate,   Regular rhythm.    No edema      Vascular:  Normal systolic impulse  No Carotid bruits    RESPIRATORY:   No wheezing  Bilateral BS  Normal chest expansion  Not tachypneic,  No use of accessory muscles    GASTROINTESTINAL:  Abdomen soft,   Non-tender,   No guarding,       MUSCULOSKELETAL:   Range of motion is not limited,  No clubbing, cyanosis    NEUROLOGICAL:   sedated    SKIN:   Skin normal color for race,   Warm and dry  No evidence of rash.    PSYCHIATRIC:   Normal mood and affect.   No apparent risk to self or others.    HEMATOLOGICAL:  No cervical  lymphadenopathy.  no inguinal lymphadenopathy      21 @ 07:01  -  21 @ 07:00  --------------------------------------------------------  IN:    Amiodarone: 416.9 mL    Esmolol: 121.8 mL    IV PiggyBack: 300 mL    Norepinephrine: 61 mL    Propofol: 305.1 mL  Total IN: 1204.8 mL    OUT:    Indwelling Catheter - Urethral (mL): 59 mL  Total OUT: 59 mL    Total NET: 1145.8 mL          LABS:                            7.1    5.74  )-----------( 118      ( 2021 04:50 )             21.4                                               04-03    137  |  99  |  51<H>  ----------------------------<  103<H>  3.9   |  17  |  4.9<HH>    Ca    8.7      2021 04:50  Phos  6.0     04-03  Mg     2.2     04-03    TPro  5.6<L>  /  Alb  3.1<L>  /  TBili  0.3  /  DBili  x   /  AST  19  /  ALT  17  /  AlkPhos  115  04-03      PT/INR - ( 2021 04:50 )   PT: 15.80 sec;   INR: 1.37 ratio         PTT - ( 2021 04:50 )  PTT:32.6 sec                                       Urinalysis Basic - ( 2021 10:44 )    Color: Yellow / Appearance: Turbid / S.027 / pH: x  Gluc: x / Ketone: Negative  / Bili: Negative / Urobili: <2 mg/dL   Blood: x / Protein: 100 mg/dL / Nitrite: Negative   Leuk Esterase: Large / RBC: 14 /HPF / WBC >720 /HPF   Sq Epi: x / Non Sq Epi: 3 /HPF / Bacteria: Moderate        CARDIAC MARKERS ( 2021 04:50 )  x     / 0.21 ng/mL / x     / x     / x      CARDIAC MARKERS ( 2021 23:30 )  x     / 0.24 ng/mL / x     / x     / x      CARDIAC MARKERS ( 2021 18:27 )  x     / 0.22 ng/mL / 34 U/L / x     / x      CARDIAC MARKERS ( 2021 17:18 )  x     / 0.22 ng/mL / x     / x     / x                                                LIVER FUNCTIONS - ( 2021 04:50 )  Alb: 3.1 g/dL / Pro: 5.6 g/dL / ALK PHOS: 115 U/L / ALT: 17 U/L / AST: 19 U/L / GGT: x                                                  Culture - Blood (collected 2021 11:00)  Source: .Blood Blood-Peripheral  Preliminary Report (2021 22:01):    No growth to date.                                                   Mode: AC/ CMV (Assist Control/ Continuous Mandatory Ventilation)  RR (machine): 20  TV (machine): 400  FiO2: 50  PEEP: 5  ITime: 1  MAP: 11  PIP: 31                                      ABG - ( 2021 04:06 )  pH, Arterial: 7.46  pH, Blood: x     /  pCO2: 28    /  pO2: 212   / HCO3: 20    / Base Excess: -3.7  /  SaO2: 99                  MEDICATIONS  (STANDING):  aMIOdarone    Tablet 200 milliGRAM(s) Oral daily  aMIOdarone Infusion 1 mG/Min (33.3 mL/Hr) IV Continuous <Continuous>  aspirin  chewable 81 milliGRAM(s) Oral daily  atorvastatin Oral Tab/Cap - Peds 40 milliGRAM(s) Oral daily  cefTRIAXone   IVPB 1000 milliGRAM(s) IV Intermittent every 24 hours  chlorhexidine 0.12% Liquid 15 milliLiter(s) Oral Mucosa every 12 hours  chlorhexidine 4% Liquid 1 Application(s) Topical <User Schedule>  dexMEDEtomidine Infusion 0.2 MICROgram(s)/kG/Hr (3.3 mL/Hr) IV Continuous <Continuous>  epoetin mana-epbx (RETACRIT) Injectable 31268 Unit(s) IV Push <User Schedule>  fentaNYL   Infusion. 0.5 MICROgram(s)/kG/Hr (3.3 mL/Hr) IV Continuous <Continuous>  levETIRAcetam  IVPB 375 milliGRAM(s) IV Intermittent every 12 hours  levETIRAcetam  IVPB 250 milliGRAM(s) IV Intermittent <User Schedule>  metolazone Oral Tab/Cap - Peds 5 milliGRAM(s) Oral daily  norepinephrine Infusion 0.05 MICROgram(s)/kG/Min (6.19 mL/Hr) IV Continuous <Continuous>  pantoprazole  Injectable 40 milliGRAM(s) IV Push daily  propofol Infusion 10 MICROgram(s)/kG/Min (3.96 mL/Hr) IV Continuous <Continuous>  senna 2 Tablet(s) Oral daily  torsemide 80 milliGRAM(s) Oral two times a day    MEDICATIONS  (PRN):      New X-rays reviewed:                                                                                  ECHO    CXR interpreted by me:

## 2021-04-03 NOTE — DIETITIAN INITIAL EVALUATION ADULT. - FACTORS AFF FOOD INTAKE
Respiratory failure requiring intubation; EN not initiated at this time. No BM since admission of note./other (specify)/NPO

## 2021-04-03 NOTE — DIETITIAN INITIAL EVALUATION ADULT. - REASON INDICATOR FOR ASSESSMENT
intubation x48h noted upon RD screening. Pt has a previous admission from 3/3; minimal nutrition hx obtained on 3/9 during that admission. See below.

## 2021-04-03 NOTE — PROGRESS NOTE ADULT - ASSESSMENT
Impression:  cardiac arrest unknown etiology   ?? arrythmia   multiple fx , hip, humerus , neck   shock ?? cardiogenic   seizure   HD     PLAN:    CNS: continue keppra   follow EEG   on propofol    HEENT: oral care     PULMONARY: vent changes: none       CARDIOVASCULAR: cardiology consult   echo   hold bp med for now   taper pressors     GI: GI prophylaxis. npo for extubation     RENAL: HD at bed side     INFECTIOUS DISEASE:   pan cx   procal   d/c cefepime and leonides  start Rocephin     HEMATOLOGICAL:  DVT prophylaxis. follow h/h if dropping ct abd , pelvis no contrast     ENDOCRINE:  Follow up FS.  Insulin protocol if needed.

## 2021-04-03 NOTE — DIETITIAN INITIAL EVALUATION ADULT. - OTHER INFO
Pt admitted d/t cardiopulmonary arrest; hip/humerous/odontoid fracture sp fall; UTI; pulmonary edema. Pt sp cardiac arrest in ED, intubated/sedated for this reason.  Remains on propofol. Nephro following for HD. Hx of ESRD on HD, DM and CHF.  Remains NPO for possible extubation.

## 2021-04-03 NOTE — PROGRESS NOTE ADULT - SUBJECTIVE AND OBJECTIVE BOX
Specialty: Neuro Critical Care     Interval Hx: No acute events overnight. Currently on video EEG. No clinical seizures reported overnight.     HPI:  71 yo F with PMH of CAD s/p CABG (2019), HFrEF (EF 20%), AS, ESRD (MWF), bladder prolapse requiring chronic Montejo, Diabetes Type 2 on insulin presents from home s/p mechanical fall. When the history was taken in the emergency department, she The denied any dizziness/ palpitations/ aura/vertigo. She reported non-radiating R shoulder pain and non-radiating R hip pain on arrival. She was found to have R humeral fracture / R hip fracture / non displaced odontoid fracture/ acute rib fractures/ ? T6 vertebral fracture.   In the emergency department, while the patient was receiving trauma work up she was found pulseless by a PCA in the room ( she was not on the monitor at that time) and had a cardiac arrest. ROSC was achieved after 2 minutes of CPR (1 epinephrine was given and 1 calcium chloride iv). Patient was intubated and sedated. EKG showed RBBB with wide complex tachycardia. Per family at the bedside, the patient had her last hemodialysis yesterday and has been relatively non compliant with her medications and medical care at home. Unable to obtain ros as patient is sedated.   T(C): 35 , HR: 60, BP: 158/67, RR: 20, SpO2: 99% vented  Labs: d-dimer ~ 4700, p-bnp 70,000, trop (0.20 <--0.17)  ABG pH, Arterial: 7.45, CO2: 36, Arterial O2: 63 , HCO3: 25 , Base Excess: 1.4 , SaO2: 94      CTH (-)   (2021 07:42)    Past Medical and Surgical History:  Diabetes  Congestive heart failure (CHF)  Pleural effusion due to congestive heart failure  End stage renal disease  on dialysis Mon, Wed, Fr  Uterine prolapse  Chronic indwelling Montejo catheter  History of repair of hip fracture    S/P CABG (coronary artery bypass graft)  2019  History of thoracentesis  Chronic indwelling Montejo catheter    Allergies: No Known Allergies    ROS: Patient unable to participate in ROS due to neurologic status.    PE:  General: ill-appearing, mechanically vented, female of appropriately stated age, sedated on Propofol, lying supine in bed. NAD  Mental status: Mechanically vented, sedated on Propofol. Does not open eyes to voice or noxious stimuli. Does not track to voice. Does not follow simple or complex commands  Cranial Nerves: Pupils 2mm sluggish bilaterally. (-) blink to threat. No gaze preference or nystagmus noted. (+) corneal reflex (+) oculocephalic reflex (+) cough reflex (+) gag reflex. Face grossly symmetrical with no loss of nasolabial folds.   Motor: Grossly deconditioned. Withdrawal to noxious stimuli bilaterally throughout, left > right  Sensation: Facial grimacing noted to noxious stimuli bilaterally throughout.     Vital Signs:  ICU Vital Signs Last 24 Hrs  T(C): 36.7 (2021 04:00), Max: 36.7 (2021 16:00)  T(F): 98 (2021 04:00), Max: 98 (2021 16:00)  HR: 70 (2021 12:00) (56 - 70)  BP: --  BP(mean): --  ABP: 116/53 (2021 12:00) (92/48 - 162/70)  ABP(mean): 88 (2021 11:00) (62 - 100)  RR: 20 (2021 12:00) (19 - 20)  SpO2: 100% (2021 12:00) (98% - 100%)    Ventilator:  Mode: AC/ CMV (Assist Control/ Continuous Mandatory Ventilation)  RR (machine): 20  TV (machine): 400  FiO2: 40  PEEP: 5  ITime: 1  MAP: 15  PIP: 30    I&O's Detail:  2021 07:01  -  2021 07:00  --------------------------------------------------------  IN:    Amiodarone: 416.9 mL    Esmolol: 121.8 mL    IV PiggyBack: 300 mL    Norepinephrine: 62 mL    Propofol: 305.1 mL  Total IN: 1205.8 mL    OUT:    Indwelling Catheter - Urethral (mL): 59 mL  Total OUT: 59 mL    Total NET: 1146.8 mL    2021 07:01  -  2021 12:47  --------------------------------------------------------  IN:    Amiodarone: 82.5 mL    IV PiggyBack: 100 mL    Norepinephrine: 4.8 mL    Propofol: 69.5 mL  Total IN: 256.8 mL    OUT:    Indwelling Catheter - Urethral (mL): 43 mL  Total OUT: 43 mL    Total NET: 213.8 mL    Labs:      137  |  99  |  51<H>  ----------------------------<  103<H>  3.9   |  17  |  4.9<HH>    Ca    8.7      2021 04:50  Phos  6.0     04-03  Mg     2.2     04-03    TPro  5.6<L>  /  Alb  3.1<L>  /  TBili  0.3  /  DBili  x   /  AST  19  /  ALT  17  /  AlkPhos  115  04-03                        6.5    4.60  )-----------( 115      ( 2021 10:50 )             20.6     PT/INR - ( 2021 04:50 )   PT: 15.80 sec;   INR: 1.37 ratio    PTT - ( 2021 04:50 )  PTT:32.6 sec  LIVER FUNCTIONS - ( 2021 04:50 )  Alb: 3.1 g/dL / Pro: 5.6 g/dL / ALK PHOS: 115 U/L / ALT: 17 U/L / AST: 19 U/L / GGT: x           ABG - ( 2021 04:06 )  pH, Arterial: 7.46  pH, Blood: x     /  pCO2: 28    /  pO2: 212   / HCO3: 20    / Base Excess: -3.7  /  SaO2: 99        Microbiology:  Culture - Blood (collected 2021 11:00)  Source: .Blood Blood-Peripheral  Preliminary Report (2021 22:01):    No growth to date.    Urinalysis Basic - ( 2021 10:44 )  Color: Yellow / Appearance: Turbid / S.027 / pH: x  Gluc: x / Ketone: Negative  / Bili: Negative / Urobili: <2 mg/dL   Blood: x / Protein: 100 mg/dL / Nitrite: Negative   Leuk Esterase: Large / RBC: 14 /HPF / WBC >720 /HPF   Sq Epi: x / Non Sq Epi: 3 /HPF / Bacteria: Moderate    Medications Current and PRN:  MEDICATIONS  (STANDING):  aMIOdarone    Tablet 200 milliGRAM(s) Oral daily  aMIOdarone Infusion 1 mG/Min (33.3 mL/Hr) IV Continuous <Continuous>  aspirin  chewable 81 milliGRAM(s) Oral daily  atorvastatin Oral Tab/Cap - Peds 40 milliGRAM(s) Oral daily  cefTRIAXone   IVPB 1000 milliGRAM(s) IV Intermittent every 24 hours  chlorhexidine 0.12% Liquid 15 milliLiter(s) Oral Mucosa every 12 hours  chlorhexidine 4% Liquid 1 Application(s) Topical <User Schedule>  dexMEDEtomidine Infusion 0.2 MICROgram(s)/kG/Hr (3.3 mL/Hr) IV Continuous <Continuous>  epoetin mana-epbx (RETACRIT) Injectable 17708 Unit(s) IV Push <User Schedule>  fentaNYL   Infusion. 0.5 MICROgram(s)/kG/Hr (3.3 mL/Hr) IV Continuous <Continuous>  levETIRAcetam  IVPB 375 milliGRAM(s) IV Intermittent every 12 hours  levETIRAcetam  IVPB 250 milliGRAM(s) IV Intermittent <User Schedule>  metolazone Oral Tab/Cap - Peds 5 milliGRAM(s) Oral daily  norepinephrine Infusion 0.05 MICROgram(s)/kG/Min (6.19 mL/Hr) IV Continuous <Continuous>  pantoprazole  Injectable 40 milliGRAM(s) IV Push daily  propofol Infusion 10 MICROgram(s)/kG/Min (3.96 mL/Hr) IV Continuous <Continuous>  senna 2 Tablet(s) Oral daily  torsemide 80 milliGRAM(s) Oral two times a day    Imaging:  EXAM:  (V)EEG SETUP PT EDU MIN 8 CH  (2021):   Awake:  During stimulation there is generalized theta slowing that is not optimally organized. No sustained PDR  Asleep:  No clearly formed sleep architecture. Background consists of generalized theta/alpha  Focal Slowing:  None  Generalized Slowing:  There is moderate generalized slowing  Breach Artifact:  None  Interictal Activity  During stimulation there is onselt of bilateral frontal epileptiform sharp waves and sharply contoured triphasic waves  Activation and Provocation Procedures:  None performed  Events:  None reported  Impression:  Abnormal due to presence of:  1. Generalized slowing  2. Bifrontal epileptiform sharp waves  3. Triphasic waves  Clinical Correlation  Findings consistent with diffuse electrocerebral dysfunction secondary to metabolic etiology. Findings consistent with potential for frontal seizure    EXAM:  CT BRAIN (2021):   IMPRESSION:  No CT evidence of acute intracranial pathology.    EXAM:  CT ABDOMEN AND PELVIS IC/CT ANGIO CHEST PE PROTOCOL IC (2021):   IMPRESSION:  No CTA evidence of acute pulmonary embolism.  Acute comminuted fracture of the right humeral neck.  Acute comminuted right intertrochanteric fracture.  Age-indeterminate fracture of T6 vertebral body, new since prior examination.  Multiple bilateral acute and chronic rib fractures.  Bilateral groundglass and consolidative pulmonary opacities, mostly nodular right base. Small bilateral pleural effusions and abdominopelvic ascites. Interlobular septal thickening consistent with pulmonary edema.  Gallbladder distention with cholelithiasis.    EXAM:  CT CERVICAL SPINE (2021):      IMPRESSION:  Lucency through base of odontoid extending into the lateral masses of C2 suggesting nondisplaced fracture. Small volume of blood within anterior extraduralspace. Consider further evaluation with MRI as clinically warranted.  Comminuted fracture right humeral neck, visualized on .    EXAM:  CT BRAIN (2021):   IMPRESSION:  No evidence of acute intracranial pathology.    Assessment:  70-year-old female with PMHx CAD, S/P CABG (2019), HFrEF (EF 20%), AS, ESRD, HMD M/W/F, bladder prolapse, requiring chronic montejo, T2DM, on Insulin, BIBA 2/2 mechanical fall. Trauma workup revealed Rt humeral fracture, Rt hip fracture, non-displaced odontoid fracture, acute rib fractures, and possible T6 vertebral fracture. While in ED, s/p cardiac arrest of unknown downtime d/t unmonitored, however ROSC achieved 2 minutes after initiation of CPR. Intubated for airway protection, started on sedation and pressors. Post-cardiac arrest, noted seizure-like episode, with right gaze preference and clonic jerking of extremities while decreasing sedation for CPAP trials. Symptoms lasting approximately 5 minutes, that resolved with Versed 2mg IV. Another similar episode witnessed by family, lasting approximately one minutes, that resolved with no intervention. Initial CTH, 2021, negative for acute intracranial pathology, with subsequent imaging post seizure revealing no change from previous CTH. Video EEG placed 2021. vEEG, 4/3/2021, consistent for metabolic process, with bifrontal sharps but no seizure activity. On examination today, patient is mechanically vented, sedated on Propofol, not follow commands, not open eyes to noxious stimuli, with withdrawal noted bilaterally throughout to noxious stimuli, left > right, with intact brainstem reflexes.     Plan:  #Neuro:  - Keep video EEG  - Neuro checks q2hrs  - Seizure precautions  - Continue Keppra 375mg BID with additional 250mg dose after dialysis  - Continue ASA 81mg PO q24hrs  - Continue Atorvastatin 40mg PO q24hrs  - Minimize sedation; utilize Fentanyl IVP prn for vent synchrony/agitation    #CV:  - Keep normotensive  - Amiodarone gtt for afib    #Resp:  - Ventilator management as per primary team  - SAT/SBT as tolerated  - VAP protocol  - Avoid sedation; utilize Fentanyl IVP prn for vent synchrony/agitation  - Keep plateau pressure < 30 to maintain venous drainage  - HOB > 35 degrees  - Aspiration precautions  - Keep SaO2 > 95%    #Renal/Fluid/Electolytes:  - Strict I/Os  - Daily weights  - Keep euvolemic  - Keep normoglycemic  - Keep normonatremic   - Keep Magnesium level > 2  - Monitor lytes, replete as needed    #GI:  - EN as per primary team  - Continue Protonix 40mg PO q24hrs  - Continue Senna 2 tablets PO q24hrs HS    #Heme/Onc:  - SCS while in bed    #ID:  - Keep normothermic; avoid fevers  - Continue Rocephin 1gm IV q24hrs  - Droplet/contact precautions 2/2 (+) MRSA, (+) adenovirus    Code status: Full code  Disposition: ICU      REGGIE Reyes-BC  Please feel free to contact for any questions or concerns   #[phone]   Specialty: Neuro Critical Care     Interval Hx: No acute events overnight. Currently on video EEG. No clinical seizures reported overnight. VEEG showed bilateral sharps and encephalopathy. No seizure.     HPI:  69 yo F with PMH of CAD s/p CABG (2019), HFrEF (EF 20%), AS, ESRD (MWF), bladder prolapse requiring chronic Montejo, Diabetes Type 2 on insulin presents from home s/p mechanical fall. When the history was taken in the emergency department, she The denied any dizziness/ palpitations/ aura/vertigo. She reported non-radiating R shoulder pain and non-radiating R hip pain on arrival. She was found to have R humeral fracture / R hip fracture / non displaced odontoid fracture/ acute rib fractures/ ? T6 vertebral fracture.   In the emergency department, while the patient was receiving trauma work up she was found pulseless by a PCA in the room ( she was not on the monitor at that time) and had a cardiac arrest. ROSC was achieved after 2 minutes of CPR (1 epinephrine was given and 1 calcium chloride iv). Patient was intubated and sedated. EKG showed RBBB with wide complex tachycardia. Per family at the bedside, the patient had her last hemodialysis yesterday and has been relatively non compliant with her medications and medical care at home. Unable to obtain ros as patient is sedated.   T(C): 35 , HR: 60, BP: 158/67, RR: 20, SpO2: 99% vented  Labs: d-dimer ~ 4700, p-bnp 70,000, trop (0.20 <--0.17)  ABG pH, Arterial: 7.45, CO2: 36, Arterial O2: 63 , HCO3: 25 , Base Excess: 1.4 , SaO2: 94      CT (-)   (2021 07:42)    Past Medical and Surgical History:  Diabetes  Congestive heart failure (CHF)  Pleural effusion due to congestive heart failure  End stage renal disease  on dialysis Mon, Wed, Fr  Uterine prolapse  Chronic indwelling Montejo catheter  History of repair of hip fracture    S/P CABG (coronary artery bypass graft)  2019  History of thoracentesis  Chronic indwelling Montejo catheter    Allergies: No Known Allergies    ROS: Patient unable to participate in ROS due to neurologic status.    PE:  General: ill-appearing, mechanically vented, female of appropriately stated age, sedated on Propofol, lying supine in bed. NAD  Mental status: Mechanically vented, sedated on Propofol. Does not open eyes to voice or noxious stimuli. Does not track to voice. Does not follow simple or complex commands. Fascial grimacing and flexion response to pain more significantly with the left arm and leg.   Cranial Nerves: Pupils 2mm sluggish bilaterally. (-) blink to threat. No gaze preference or nystagmus noted. (+) corneal reflex (+) oculocephalic reflex (+) cough reflex (+) gag reflex. Face grossly symmetrical with no loss of nasolabial folds.   Motor: Grossly deconditioned. Withdrawal to noxious stimuli bilaterally throughout, left > right  Sensation: Facial grimacing noted to noxious stimuli bilaterally throughout.     Vital Signs:  ICU Vital Signs Last 24 Hrs  T(C): 36.7 (2021 04:00), Max: 36.7 (2021 16:00)  T(F): 98 (2021 04:00), Max: 98 (2021 16:00)  HR: 70 (2021 12:00) (56 - 70)  BP: --  BP(mean): --  ABP: 116/53 (2021 12:00) (92/48 - 162/70)  ABP(mean): 88 (2021 11:00) (62 - 100)  RR: 20 (2021 12:00) (19 - 20)  SpO2: 100% (2021 12:00) (98% - 100%)    Ventilator:  Mode: AC/ CMV (Assist Control/ Continuous Mandatory Ventilation)  RR (machine): 20  TV (machine): 400  FiO2: 40  PEEP: 5  ITime: 1  MAP: 15  PIP: 30    I&O's Detail:  2021 07:01  -  2021 07:00  --------------------------------------------------------  IN:    Amiodarone: 416.9 mL    Esmolol: 121.8 mL    IV PiggyBack: 300 mL    Norepinephrine: 62 mL    Propofol: 305.1 mL  Total IN: 1205.8 mL    OUT:    Indwelling Catheter - Urethral (mL): 59 mL  Total OUT: 59 mL    Total NET: 1146.8 mL    2021 07:01  -  2021 12:47  --------------------------------------------------------  IN:    Amiodarone: 82.5 mL    IV PiggyBack: 100 mL    Norepinephrine: 4.8 mL    Propofol: 69.5 mL  Total IN: 256.8 mL    OUT:    Indwelling Catheter - Urethral (mL): 43 mL  Total OUT: 43 mL    Total NET: 213.8 mL    Labs:      137  |  99  |  51<H>  ----------------------------<  103<H>  3.9   |  17  |  4.9<HH>    Ca    8.7      2021 04:50  Phos  6.0     04-03  Mg     2.2     04-03    TPro  5.6<L>  /  Alb  3.1<L>  /  TBili  0.3  /  DBili  x   /  AST  19  /  ALT  17  /  AlkPhos  115  04-03                        6.5    4.60  )-----------( 115      ( 2021 10:50 )             20.6     PT/INR - ( 2021 04:50 )   PT: 15.80 sec;   INR: 1.37 ratio    PTT - ( 2021 04:50 )  PTT:32.6 sec  LIVER FUNCTIONS - ( 2021 04:50 )  Alb: 3.1 g/dL / Pro: 5.6 g/dL / ALK PHOS: 115 U/L / ALT: 17 U/L / AST: 19 U/L / GGT: x           ABG - ( 2021 04:06 )  pH, Arterial: 7.46  pH, Blood: x     /  pCO2: 28    /  pO2: 212   / HCO3: 20    / Base Excess: -3.7  /  SaO2: 99        Microbiology:  Culture - Blood (collected 2021 11:00)  Source: .Blood Blood-Peripheral  Preliminary Report (2021 22:01):    No growth to date.    Urinalysis Basic - ( 2021 10:44 )  Color: Yellow / Appearance: Turbid / S.027 / pH: x  Gluc: x / Ketone: Negative  / Bili: Negative / Urobili: <2 mg/dL   Blood: x / Protein: 100 mg/dL / Nitrite: Negative   Leuk Esterase: Large / RBC: 14 /HPF / WBC >720 /HPF   Sq Epi: x / Non Sq Epi: 3 /HPF / Bacteria: Moderate    Medications Current and PRN:  MEDICATIONS  (STANDING):  aMIOdarone    Tablet 200 milliGRAM(s) Oral daily  aMIOdarone Infusion 1 mG/Min (33.3 mL/Hr) IV Continuous <Continuous>  aspirin  chewable 81 milliGRAM(s) Oral daily  atorvastatin Oral Tab/Cap - Peds 40 milliGRAM(s) Oral daily  cefTRIAXone   IVPB 1000 milliGRAM(s) IV Intermittent every 24 hours  chlorhexidine 0.12% Liquid 15 milliLiter(s) Oral Mucosa every 12 hours  chlorhexidine 4% Liquid 1 Application(s) Topical <User Schedule>  dexMEDEtomidine Infusion 0.2 MICROgram(s)/kG/Hr (3.3 mL/Hr) IV Continuous <Continuous>  epoetin mana-epbx (RETACRIT) Injectable 87426 Unit(s) IV Push <User Schedule>  fentaNYL   Infusion. 0.5 MICROgram(s)/kG/Hr (3.3 mL/Hr) IV Continuous <Continuous>  levETIRAcetam  IVPB 375 milliGRAM(s) IV Intermittent every 12 hours  levETIRAcetam  IVPB 250 milliGRAM(s) IV Intermittent <User Schedule>  metolazone Oral Tab/Cap - Peds 5 milliGRAM(s) Oral daily  norepinephrine Infusion 0.05 MICROgram(s)/kG/Min (6.19 mL/Hr) IV Continuous <Continuous>  pantoprazole  Injectable 40 milliGRAM(s) IV Push daily  propofol Infusion 10 MICROgram(s)/kG/Min (3.96 mL/Hr) IV Continuous <Continuous>  senna 2 Tablet(s) Oral daily  torsemide 80 milliGRAM(s) Oral two times a day    Imaging:  EXAM:  (V)EEG SETUP PT EDU MIN 8 CH  (2021):   Awake:  During stimulation there is generalized theta slowing that is not optimally organized. No sustained PDR  Asleep:  No clearly formed sleep architecture. Background consists of generalized theta/alpha  Focal Slowing:  None  Generalized Slowing:  There is moderate generalized slowing  Breach Artifact:  None  Interictal Activity  During stimulation there is onselt of bilateral frontal epileptiform sharp waves and sharply contoured triphasic waves  Activation and Provocation Procedures:  None performed  Events:  None reported  Impression:  Abnormal due to presence of:  1. Generalized slowing  2. Bifrontal epileptiform sharp waves  3. Triphasic waves  Clinical Correlation  Findings consistent with diffuse electrocerebral dysfunction secondary to metabolic etiology. Findings consistent with potential for frontal seizure    EXAM:  CT BRAIN (2021):   IMPRESSION:  No CT evidence of acute intracranial pathology.    EXAM:  CT ABDOMEN AND PELVIS IC/CT ANGIO CHEST PE PROTOCOL IC (2021):   IMPRESSION:  No CTA evidence of acute pulmonary embolism.  Acute comminuted fracture of the right humeral neck.  Acute comminuted right intertrochanteric fracture.  Age-indeterminate fracture of T6 vertebral body, new since prior examination.  Multiple bilateral acute and chronic rib fractures.  Bilateral groundglass and consolidative pulmonary opacities, mostly nodular right base. Small bilateral pleural effusions and abdominopelvic ascites. Interlobular septal thickening consistent with pulmonary edema.  Gallbladder distention with cholelithiasis.    EXAM:  CT CERVICAL SPINE (2021):      IMPRESSION:  Lucency through base of odontoid extending into the lateral masses of C2 suggesting nondisplaced fracture. Small volume of blood within anterior extraduralspace. Consider further evaluation with MRI as clinically warranted.  Comminuted fracture right humeral neck, visualized on .    EXAM:  CT BRAIN (2021):   IMPRESSION:  No evidence of acute intracranial pathology.    Assessment:  70-year-old female with PMHx CAD, S/P CABG (2019), HFrEF (EF 20%), AS, ESRD, HMD M/W/F, bladder prolapse, requiring chronic montejo, T2DM, on Insulin, BIBA 2/2 mechanical fall. Trauma workup revealed Rt humeral fracture, Rt hip fracture, non-displaced odontoid fracture, acute rib fractures, and possible T6 vertebral fracture. While in ED, s/p cardiac arrest of unknown downtime d/t unmonitored, however ROSC achieved 2 minutes after initiation of CPR. Intubated for airway protection, started on sedation and pressors. Post-cardiac arrest, noted seizure-like episode, with right gaze preference and clonic jerking of extremities while decreasing sedation for CPAP trials. Symptoms lasting approximately 5 minutes, that resolved with Versed 2mg IV. Another similar episode witnessed by family, lasting approximately one minutes, that resolved with no intervention. Initial CTH, 2021, negative for acute intracranial pathology, with subsequent imaging post seizure revealing no change from previous CTH. Video EEG placed 2021. vEEG, 4/3/2021, consistent for metabolic process, with bifrontal sharps but no seizure activity. On examination today, patient is mechanically vented, sedated on Propofol, not follow commands, not open eyes to noxious stimuli, with withdrawal noted bilaterally throughout to noxious stimuli, left > right, with intact brainstem reflexes.     Plan:  #Neuro:  - Keep video EEG for another 24 hours.   - Neuro checks q2hrs  - Seizure precautions  - Continue Keppra 375mg BID with additional 250mg dose after dialysis  - Continue ASA 81mg PO q24hrs  - Continue Atorvastatin 40mg PO q24hrs  - Minimize sedation; utilize Fentanyl IVP prn for vent synchrony/agitation    #CV:  - Keep normotensive  - Amiodarone gtt for afib    #Resp:  - Ventilator management as per primary team  - SAT/SBT as tolerated  - VAP protocol  - Avoid sedation; utilize Fentanyl IVP prn for vent synchrony/agitation  - Keep plateau pressure < 30 to maintain venous drainage  - HOB > 35 degrees  - Aspiration precautions  - Keep SaO2 > 95%    #Renal/Fluid/Electolytes:  - Strict I/Os  - Daily weights  - Keep euvolemic  - Keep normoglycemic  - Keep normonatremic   - Keep Magnesium level > 2  - Monitor lytes, replete as needed    #GI:  - EN as per primary team  - Continue Protonix 40mg PO q24hrs  - Continue Senna 2 tablets PO q24hrs HS    #Heme/Onc:  - SCS while in bed    #ID:  - Keep normothermic; avoid fevers  - Continue Rocephin 1gm IV q24hrs  - Droplet/contact precautions 2/2 (+) MRSA, (+) adenovirus    Code status: Full code  Disposition: ICU      Sapphire Barrios [facility]-BC  Please feel free to contact for any questions or concerns   #[phone]

## 2021-04-03 NOTE — PROGRESS NOTE ADULT - ASSESSMENT
9 yo F with PMH of CAD s/p CABG (November 2019), HFrEF (EF 20%), AS, ESRD (MWF), bladder prolapse requiring chronic Castellon, Diabetes Type 2 on insulin presents from home s/p mechanical fall.  Multiple traumas: R humeral fracture / R hip fracture / non displaced odontoid fracture/ acute rib fractures/ ? T6 vertebral fracture  Found to be pulseless in trauma bay, PEA vs EF (not on monitor), CPR x2min  Seizurelike activities when sedation held  Extubated 4/2, followed by arrhythmia AF vs VT was reintubated and cardioverted  EF 20-25%  anemia Hg 6.5-> being trasfused 1u PRBC    Paroxysmal AF w/RVR s/p DCCV and amiodarone drip, can not rule out VT as the cause of cardiac arrest => in NSR now CHADSVASc 5  Hx of GI bleed  NUBIA clip   Acute decompensated CHF  Ischemic CMP  CAD s/p CABG in 2019  Severe AS  Moderate to severe MR   ESRD on HD  Anemia acute on chronic    con't tele  switch Amio to PO 200mg q12h after IV load completed  Metoprolol when BP tolerates  Anemia, work up for any source of bleeding  will need AC if no active bleed  will plan for CRT-D placement once stable, before extubation,, tentatively Monday if cleared  ID clearance for invasive procedure / device implantation  Bactroban to nares for +MRSA  Neurology clearance   will need repeat COVID swab for next week procedure per department protocol  will follow

## 2021-04-03 NOTE — PROGRESS NOTE ADULT - ATTENDING COMMENTS
Complicated situation  Syncope/mechanical fall with odontoid fracture  Episode of WCT which appears to be AF/RVR. However, possibility of VT can't be ruled out with certainty  Ischemic CM with fluctuating EF with multiple and recent EF <35%    - Continue with amiodarone for now. Can switch to PO if PO feeding are tolerated.  - Patient not on OAC due to trauma and history of significant GI bleeding (?)  - We will consider placement of CRT-D if moving in right direction clinically. Prefer to do it prior to extubation. Will need ID, neuro clearance prior to the procedure  - Management of anemia, fluid status as per nephrology and critical care team

## 2021-04-03 NOTE — DIETITIAN INITIAL EVALUATION ADULT. - PERTINENT MEDS FT
rocephin, peridex, amiodarone, retacrit, keppra, lipitor, precedex, metolazone, levophed, protonix, propofol (13.9 mL/h, provides ~367 kcal/d), senna, demadex

## 2021-04-04 NOTE — CONSULT NOTE ADULT - ASSESSMENT
ASSESSMENT  70y F admitted with CARDIOPULMONARY ARREST; HIP, HUMERUS, ODONTOID FX; UTI; PULMONARY EDEMA    HPI:  71 yo F with PMH of CAD s/p CABG (November 2019), HFrEF (EF 20%), AS, ESRD (MWF), bladder prolapse requiring chronic Castellon, Diabetes Type 2 on insulin presents from home s/p mechanical fall. When the history was taken in the emergency department, she The denied any dizziness/ palpitations/ aura/vertigo. She reported non-radiating R shoulder pain and non-radiating R hip pain on arrival. She was found to have R humeral fracture / R hip fracture / non displaced odontoid fracture/ acute rib fractures/ ? T6 vertebral fracture. In the emergency department, while the patient was receiving trauma work up she was found pulseless by a PCA in the room ( she was not on the monitor at that time) and had a cardiac arrest. ROSC was achieved after 2 minutes of CPR (1 epinephrine was given and 1 calcium chloride iv). Patient was intubated and sedated. EKG showed RBBB with wide complex tachycardia. Per family at the bedside, the patient had her last hemodialysis yesterday and has been relatively non compliant with her medications and medical care at home. Unable to obtain ros as patient is sedated.   T(C): 35 , HR: 60, BP: 158/67, RR: 20, SpO2: 99% vented Labs: d-dimer ~ 4700, p-bnp 70,000, trop (0.20 <--0.17) ABG pH, Arterial: 7.45, CO2: 36, Arterial O2: 63 , HCO3: 25 , Base Excess: 1.4 , SaO2: 94        PROBLEMS  Pt with Adenovirus pneumonia & recent cardiopulmonary arrest. Vented. R/O gram negative pneumonia in pt with DM.  R/O MRSA pneumonia  MRSA PCR Result.: Positive (04-02-21 @ 04:50)    New problem with additional W/U  acute illness which poses threat to bodily function    On cefTRIAXone   IVPB 1000 milliGRAM(s) IV Intermittent every 24 hours    CT with Bilateral groundglass and consolidative opacities, most pronounced at right base, with scattered areas of interlobular septal thickening. Left lower lobe atelectasis. Small bilateral pleural effusions.    -ESRD on HD  R/O UTI  U/A Nitrite: Negative /Leuk Esterase: Large / RBC: 14 /HPF / WBC >720 /HPF  / Bacteria: Moderate    -Lactic acidosis    PLAN  - Suction for Sputum C&S and Gram stain  - Continue Ceftriaxone  - Would give post-HD Vancomycin in view of DM & positive nasal MRSA PCR  - Vanco trough prior to 4th dose   - Continue Droplet/Contact Isolation for duration of respiratory illness.  Will confer with epidemiology this morning Repeat D-dimer, ferritin, & CRP in 2 days: AICD placement ASSESSMENT  70y F admitted with CARDIOPULMONARY ARREST; HIP, HUMERUS, ODONTOID FX; UTI; PULMONARY EDEMA    HPI:  69 yo F with PMH of CAD s/p CABG (November 2019), HFrEF (EF 20%), AS, ESRD (MWF), bladder prolapse requiring chronic Castellon, Diabetes Type 2 on insulin presents from home s/p mechanical fall. When the history was taken in the emergency department, she The denied any dizziness/ palpitations/ aura/vertigo. She reported non-radiating R shoulder pain and non-radiating R hip pain on arrival. She was found to have R humeral fracture / R hip fracture / non displaced odontoid fracture/ acute rib fractures/ ? T6 vertebral fracture. In the emergency department, while the patient was receiving trauma work up she was found pulseless by a PCA in the room ( she was not on the monitor at that time) and had a cardiac arrest. ROSC was achieved after 2 minutes of CPR (1 epinephrine was given and 1 calcium chloride iv). Patient was intubated and sedated. EKG showed RBBB with wide complex tachycardia. Per family at the bedside, the patient had her last hemodialysis yesterday and has been relatively non compliant with her medications and medical care at home. Unable to obtain ros as patient is sedated.   T(C): 35 , HR: 60, BP: 158/67, RR: 20, SpO2: 99% vented Labs: d-dimer ~ 4700, p-bnp 70,000, trop (0.20 <--0.17) ABG pH, Arterial: 7.45, CO2: 36, Arterial O2: 63 , HCO3: 25 , Base Excess: 1.4 , SaO2: 94        PROBLEMS  Pt with Adenovirus pneumonia & recent cardiopulmonary arrest. Vented. R/O gram negative pneumonia in pt with DM.  R/O MRSA pneumonia  MRSA PCR Result.: Positive (04-02-21 @ 04:50)    New problem with additional W/U  acute illness which poses threat to bodily function    On cefTRIAXone   IVPB 1000 milliGRAM(s) IV Intermittent every 24 hours    CT with Bilateral groundglass and consolidative opacities, most pronounced at right base, with scattered areas of interlobular septal thickening. Left lower lobe atelectasis. Small bilateral pleural effusions.    -ESRD on HD  R/O UTI  U/A Nitrite: Negative /Leuk Esterase: Large / RBC: 14 /HPF / WBC >720 /HPF  / Bacteria: Moderate    -Lactic acidosis    PLAN  - Suction for Sputum C&S and Gram stain  - Continue Ceftriaxone  - Would give post-HD Vancomycin in view of DM & positive nasal MRSA PCR  - Vanco trough prior to 4th dose   - Continue Droplet/Contact Isolation for duration of respiratory illness.  From ID/Epidemiology standpoint, AICD placement may be done as last case of day followed by room cleaning.

## 2021-04-04 NOTE — PROGRESS NOTE ADULT - SUBJECTIVE AND OBJECTIVE BOX
INTERVAL HPI/OVERNIGHT EVENTS:  remains on vEEG, suspected seazures  vent on minimal setting  no event on tele    MEDICATIONS  (STANDING):  aMIOdarone    Tablet 200 milliGRAM(s) Oral two times a day  aspirin  chewable 81 milliGRAM(s) Oral daily  atorvastatin Oral Tab/Cap - Peds 40 milliGRAM(s) Oral daily  BACItracin   Ointment 1 Application(s) Topical two times a day  cefTRIAXone   IVPB 1000 milliGRAM(s) IV Intermittent every 24 hours  chlorhexidine 0.12% Liquid 15 milliLiter(s) Oral Mucosa every 12 hours  chlorhexidine 4% Liquid 1 Application(s) Topical <User Schedule>  dexMEDEtomidine Infusion 0.2 MICROgram(s)/kG/Hr (3.3 mL/Hr) IV Continuous <Continuous>  diVALproex  milliGRAM(s) Oral every 12 hours  epoetin mana-epbx (RETACRIT) Injectable 24334 Unit(s) IV Push <User Schedule>  levETIRAcetam  IVPB 375 milliGRAM(s) IV Intermittent every 12 hours  levETIRAcetam  IVPB 250 milliGRAM(s) IV Intermittent <User Schedule>  metolazone Oral Tab/Cap - Peds 5 milliGRAM(s) Oral daily  mupirocin 2% Ointment 1 Application(s) Topical two times a day  norepinephrine Infusion 0.05 MICROgram(s)/kG/Min (6.19 mL/Hr) IV Continuous <Continuous>  pantoprazole  Injectable 40 milliGRAM(s) IV Push daily  senna 2 Tablet(s) Oral daily  torsemide 80 milliGRAM(s) Oral two times a day    MEDICATIONS  (PRN):  fentaNYL    Injectable 25 MICROGram(s) IV Push every 4 hours PRN Moderate Pain (4 - 6)      Allergies    No Known Allergies    Intolerances        REVIEW OF SYSTEMS    [ ] A ten-point review of systems was otherwise negative except as noted.  x[ ] Due to altered mental status/intubation, subjective information were not able to be obtained from the patient. History was obtained, to the extent possible, from review of the chart and collateral sources of information.      Vital Signs Last 24 Hrs  T(C): 36.9 (04 Apr 2021 12:00), Max: 37.2 (03 Apr 2021 20:00)  T(F): 98.5 (04 Apr 2021 12:00), Max: 99 (03 Apr 2021 20:00)  HR: 54 (04 Apr 2021 14:00) (54 - 112)  BP: --  BP(mean): --  RR: 71 (04 Apr 2021 14:00) (12 - 136)  SpO2: 99% (04 Apr 2021 14:00) (99% - 100%)    04-03-21 @ 07:01  -  04-04-21 @ 07:00  --------------------------------------------------------  IN: 1079.7 mL / OUT: 163 mL / NET: 916.7 mL    04-04-21 @ 07:01  -  04-04-21 @ 15:03  --------------------------------------------------------  IN: 69 mL / OUT: 60 mL / NET: 9 mL        PHYSICAL EXAM:    GENERALNeuro: intubated/sedated, RASS -2, withdraw all 4 extr to painful stimuli, .PERRLA  HEART: Regular rate and rhythm; No murmur; NO rubs, or gallops.  PULMONARY: Clear to auscultation and percussion.  Normal expansion/effort. No rales, wheezing, or rhonchi bilaterally.  ABDOMEN: Soft, Nontender, Nondistended; Bowel sounds present  EXTREMITIES:  Extremities warm, pink, well-perfused, 2+ Peripheral Pulses, No clubbing, cyanosis, or edema      LABS:                        8.2    5.48  )-----------( 117      ( 04 Apr 2021 04:30 )             25.4     04-04    137  |  98  |  26<H>  ----------------------------<  84  3.4<L>   |  24  |  3.4<H>    Ca    8.5      04 Apr 2021 04:30  Phos  4.3     04-04  Mg     2.1     04-04    TPro  5.6<L>  /  Alb  3.2<L>  /  TBili  0.3  /  DBili  x   /  AST  16  /  ALT  13  /  AlkPhos  129<H>  04-04          12 Lead ECG:   Ventricular Rate 85 BPM    Atrial Rate 267 BPM    QRS Duration 180 ms    Q-T Interval 448 ms    QTC Calculation(Bazett) 533 ms    R Axis -54 degrees    T Axis 113 degrees    Diagnosis Line Atrial fibrillation  Right bundle branch block  Left anterior fascicular block  *** Bifascicular block ***  Left ventricular hypertrophy with QRS widening and repolarization abnormality  Septal infarct , age undetermined  Abnormal ECG    Confirmed by Sapphire Burns MD ([id number]) on 4/4/2021 8:10:04 AM (04-04-21 @ 05:55)  12 Lead ECG:   Ventricular Rate 95 BPM    Atrial Rate 98 BPM    QRS Duration 186 ms    Q-T Interval 386 ms    QTC Calculation(Bazett) 485 ms    R Axis -47 degrees    T Axis 127 degrees    Diagnosis Line Atrial fibrillation  Left axis deviation  Right bundle branch block  Left ventricular hypertrophy with repolarization abnormality  Septal infarct , age undetermined  T wave abnormality, consider lateral ischemia  Abnormal ECG    Confirmed by Sapphire Burns MD ([id number]) on 4/4/2021 8:07:56 AM (04-04-21 @ 01:40)  12 Lead ECG:   Systolic BP 83 mmHg    Diastolic BP 43 mmHg    Ventricular Rate 79 BPM    Atrial Rate 79 BPM    P-R Interval 32 ms    QRS Duration 179 ms    Q-T Interval 504 ms    QTC Calculation(Bazett) 578 ms    R Axis -55 degrees    T Axis 114 degrees    Diagnosis Line Atrial fibrillation  Left axis deviation  Right bundle branch block  Left anterior fascicular block  *** Bifascicular block ***  Left ventricular hypertrophy with QRS widening and repolarization abnormality  Anteroseptal infarct , age undetermined  Abnormal ECG    Confirmed by Sapphire Burns MD ([id number]) on 4/4/2021 8:31:31 AM (04-04-21 @ 00:32)      RADIOLOGY & ADDITIONAL TESTS:      < from: EEG w/ Video Each 12-26 Hours, Unmonitored (04.03.21 @ 08:50) >  Impression:  Abnormal due to presence of:  1. Generalized slowing  2. Bifrontal epileptiform sharp waves  3. Triphasic waves      Clinical Correlation  Findings consistent with diffuse electrocerebral dysfunction secondary to metabolic etiology. Findings consistent with potential for frontal seizure    < end of copied text >

## 2021-04-04 NOTE — PROGRESS NOTE ADULT - SUBJECTIVE AND OBJECTIVE BOX
SEAN FOLLOW UP NOTE  --------------------------------------------------------------------------------  Chief Complaint/24 hour events/subjective:    pt seen, still on vent and pressor    PAST HISTORY  --------------------------------------------------------------------------------  No significant changes to PMH, PSH, FHx, SHx, unless otherwise noted    ALLERGIES & MEDICATIONS  --------------------------------------------------------------------------------  Allergies    No Known Allergies    Intolerances      Standing Inpatient Medications  aMIOdarone    Tablet 200 milliGRAM(s) Oral two times a day  aspirin  chewable 81 milliGRAM(s) Oral daily  atorvastatin Oral Tab/Cap - Peds 40 milliGRAM(s) Oral daily  BACItracin   Ointment 1 Application(s) Topical two times a day  cefTRIAXone   IVPB 1000 milliGRAM(s) IV Intermittent every 24 hours  chlorhexidine 0.12% Liquid 15 milliLiter(s) Oral Mucosa every 12 hours  chlorhexidine 4% Liquid 1 Application(s) Topical <User Schedule>  dexMEDEtomidine Infusion 0.2 MICROgram(s)/kG/Hr IV Continuous <Continuous>  epoetin mana-epbx (RETACRIT) Injectable 57108 Unit(s) IV Push <User Schedule>  levETIRAcetam  IVPB 375 milliGRAM(s) IV Intermittent every 12 hours  levETIRAcetam  IVPB 250 milliGRAM(s) IV Intermittent <User Schedule>  metolazone Oral Tab/Cap - Peds 5 milliGRAM(s) Oral daily  mupirocin 2% Ointment 1 Application(s) Topical two times a day  norepinephrine Infusion 0.05 MICROgram(s)/kG/Min IV Continuous <Continuous>  pantoprazole  Injectable 40 milliGRAM(s) IV Push daily  senna 2 Tablet(s) Oral daily  torsemide 80 milliGRAM(s) Oral two times a day    PRN Inpatient Medications      REVIEW OF SYSTEMS  --------------------------------------------------------------------------------    All other systems were reviewed and are negative, except as noted.    VITALS/PHYSICAL EXAM  --------------------------------------------------------------------------------  T(C): 36.7 (04-04-21 @ 08:00), Max: 37.2 (04-03-21 @ 20:00)  HR: 90 (04-04-21 @ 08:00) (70 - 102)  BP: --  RR: 39 (04-04-21 @ 08:00) (12 - 39)  SpO2: 99% (04-04-21 @ 08:00) (99% - 100%)  Wt(kg): --        04-03-21 @ 07:01  -  04-04-21 @ 07:00  --------------------------------------------------------  IN: 1079.7 mL / OUT: 163 mL / NET: 916.7 mL    04-04-21 @ 07:01  -  04-04-21 @ 11:26  --------------------------------------------------------  IN: 22 mL / OUT: 20 mL / NET: 2 mL      Physical Exam:    	Gen: on vent  no distress   	Pulm:  B/L BS  	CV: S1S2; no rub  	Abd: +BS, soft, nontender/nondistended  	LE:  no  edema      LABS/STUDIES  --------------------------------------------------------------------------------              8.2    5.48  >-----------<  117      [04-04-21 @ 04:30]              25.4     137  |  98  |  26  ----------------------------<  84      [04-04-21 @ 04:30]  3.4   |  24  |  3.4        Ca     8.5     [04-04-21 @ 04:30]      Mg     2.1     [04-04-21 @ 04:30]      Phos  4.3     [04-04-21 @ 04:30]    TPro  5.6  /  Alb  3.2  /  TBili  0.3  /  DBili  x   /  AST  16  /  ALT  13  /  AlkPhos  129  [04-04-21 @ 04:30]        Troponin 0.33      [04-04-21 @ 04:30]    Creatinine Trend:  SCr 3.4 [04-04 @ 04:30]  SCr 4.9 [04-03 @ 04:50]  SCr 4.2 [04-02 @ 04:50]  SCr 3.8 [04-01 @ 18:27]  SCr 3.6 [04-01 @ 08:56]    Urinalysis - [04-02-21 @ 10:44]      Color Yellow / Appearance Turbid / SG 1.027 / pH 7.0      Gluc Negative / Ketone Negative  / Bili Negative / Urobili <2 mg/dL       Blood Moderate / Protein 100 mg/dL / Leuk Est Large / Nitrite Negative      RBC 14 / WBC >720 / Hyaline 16 / Gran  / Sq Epi  / Non Sq Epi 3 / Bacteria Moderate      Iron 55, TIBC 167, %sat 33      [05-27-20 @ 13:22]  Ferritin 636      [05-27-20 @ 13:22]  HbA1c 5.7      [11-09-19 @ 01:20]

## 2021-04-04 NOTE — CHART NOTE - NSCHARTNOTEFT_GEN_A_CORE
RK: As directed by Dr. Solo, I called and spoke with the patient's son and daughter regarding possible issues with heparin in the past. They denied any issues with heparin in the past and that she has received heparin in the past without issues.     Dr. Solo also asked if we can wait until CRT is performed before possibly extubating the patient.

## 2021-04-04 NOTE — CONSULT NOTE ADULT - SUBJECTIVE AND OBJECTIVE BOX
GUS WILBURN  70y, Female  Allergy: No Known Allergies      CHIEF COMPLAINT: cardiac arrest (2021 11:39)      HPI:  71 yo F with PMH of CAD s/p CABG (2019), HFrEF (EF 20%), AS, ESRD (MWF), bladder prolapse requiring chronic Castellon, Diabetes Type 2 on insulin presents from home s/p mechanical fall. When the history was taken in the emergency department, she The denied any dizziness/ palpitations/ aura/vertigo. She reported non-radiating R shoulder pain and non-radiating R hip pain on arrival. She was found to have R humeral fracture / R hip fracture / non displaced odontoid fracture/ acute rib fractures/ ? T6 vertebral fracture.     In the emergency department, while the patient was receiving trauma work up she was found pulseless by a PCA in the room ( she was not on the monitor at that time) and had a cardiac arrest. ROSC was achieved after 2 minutes of CPR (1 epinephrine was given and 1 calcium chloride iv). Patient was intubated and sedated. EKG showed RBBB with wide complex tachycardia. Per family at the bedside, the patient had her last hemodialysis yesterday and has been relatively non compliant with her medications and medical care at home. Unable to obtain ros as patient is sedated.     T(C): 35 , HR: 60, BP: 158/67, RR: 20, SpO2: 99% vented  Labs: d-dimer ~ 4700, p-bnp 70,000, trop (0.20 <--0.17)  ABG pH, Arterial: 7.45, CO2: 36, Arterial O2: 63 , HCO3: 25 , Base Excess: 1.4 , SaO2: 94      CTH (-)      (2021 07:42)    FAMILY HISTORY:  FH: stomach cancer (Mother)    FH: myocardial infarction (Mother)      PAST MEDICAL & SURGICAL HISTORY:  Diabetes    Congestive heart failure (CHF)    Pleural effusion due to congestive heart failure    End stage renal disease  on dialysis Mon, Wed, Fr    Uterine prolapse    Chronic indwelling Castellon catheter    History of repair of hip fracture  2019    S/P CABG (coronary artery bypass graft)  2019    History of thoracentesis    Chronic indwelling Castellon catheter        SOCIAL HISTORY  Social History:  Denies tobacco, alcohol and drug use. (2021 07:42)        ROS  General: Denies fevers, chills, nightsweats, weight loss  HEENT: Denies headache, rhinorrhea, sore throat, eye pain  CV: Denies CP, palpitations  PULM: Denies SOB, cough  GI: Denies abdominal pain, diarrhea  : Denies dysuria, hematuria  MSK: Denies arthralgias  SKIN: Denies rash   NEURO: Denies paresthesias, weakness  PSYCH: Denies depression    VITALS:  T(F): 98.4, Max: 99 (-21 @ 20:00)  HR: 92  BP: --  RR: 13Vital Signs Last 24 Hrs  T(C): 36.9 (2021 04:00), Max: 37.2 (2021 20:00)  T(F): 98.4 (2021 04:00), Max: 99 (2021 20:00)  HR: 92 (2021 04:00) (58 - 102)  BP: --  BP(mean): --  RR: 13 (2021 04:00) (12 - 24)  SpO2: 100% (2021 04:00) (98% - 100%)    PHYSICAL EXAM:  Vented  HEENT: Normocephalic, atraumatic  Neck: supple, no lymphadenopathy  CV: Regular rate & regular rhythm  Lungs: decreased BS at bases, no fremitus  Abdomen: Soft, BS present  Ext: Warm, well perfused  Neuro: non focal, awake  Skin: no rash, no erythema    TESTS & MEASUREMENTS:                        8.2    5.48  )-----------( 117      ( 2021 04:30 )             25.4     04-03    137  |  99  |  51<H>  ----------------------------<  103<H>  3.9   |  17  |  4.9<HH>    Ca    8.7      2021 04:50  Phos  6.0     04-03  Mg     2.2     04-03    TPro  5.6<L>  /  Alb  3.1<L>  /  TBili  0.3  /  DBili  x   /  AST  19  /  ALT  17  /  AlkPhos  115  04-03      LIVER FUNCTIONS - ( 2021 04:50 )  Alb: 3.1 g/dL / Pro: 5.6 g/dL / ALK PHOS: 115 U/L / ALT: 17 U/L / AST: 19 U/L / GGT: x           Urinalysis Basic - ( 2021 10:44 )    Color: Yellow / Appearance: Turbid / S.027 / pH: x  Gluc: x / Ketone: Negative  / Bili: Negative / Urobili: <2 mg/dL   Blood: x / Protein: 100 mg/dL / Nitrite: Negative   Leuk Esterase: Large / RBC: 14 /HPF / WBC >720 /HPF   Sq Epi: x / Non Sq Epi: 3 /HPF / Bacteria: Moderate        Culture - Blood (collected 21 @ 11:52)  Source: .Blood None  Preliminary Report (21 @ 23:01):    No growth to date.    Culture - Blood (collected 21 @ 05:40)  Source: .Blood None  Preliminary Report (21 @ 14:01):    No growth to date.    Culture - Blood (collected 21 @ 11:00)  Source: .Blood Blood-Peripheral  Preliminary Report (21 @ 22:01):    No growth to date.        Lactate, Blood: 1.3 mmol/L (21 @ 08:56)  Lactate, Blood: 5.5 mmol/L (21 @ 03:19)  Lactate, Blood: 1.6 mmol/L (21 @ 02:50)      INFECTIOUS DISEASES TESTING  MRSA PCR Result.: Positive (21 @ 04:50)  MRSA PCR Result.: Positive (20 @ 16:46)  Hepatitis B Surface Antigen: Nonreact (20 @ 14:58)  MRSA PCR Result.: Negative (05-15-20 @ 05:20)      RADIOLOGY & ADDITIONAL TESTS:  I have personally reviewed the last Chest xray  CXR      CT  CT Abdomen and Pelvis w/ IV Cont:   EXAM:  CT ABDOMEN AND PELVIS IC        EXAM:  CT ANGIO CHEST PE PROTOCOL IC            PROCEDURE DATE:  2021            INTERPRETATION:  CLINICAL HISTORY / REASON FOR EXAM: Shortness of breath. Fall.    TECHNIQUE: Multislice helical sections were obtained from the thoracic inlet to the lung bases during rapid administration of intravenous contrast using a CTA pulmonary embolism protocol. Thin sections were reconstructed through the pulmonary vasculature. Subsequently, axial images were obtained from the lung bases to the pubic symphysis. Coronal, sagittal and 3D/MIP reformatted images are also submitted.    COMPARISON: CT CHEST/ABDOMEN/PELVIS 7/10/2020.    FINDINGS:    CHEST:    PULMONARY EMBOLUS: No pulmonary embolus..    Tubes/lines/devices: Endotracheal tube terminating above the olya. Enteric tube terminating within the gastric fundus. Right chest tunneled dialysis catheter. Left IJ central venous catheter with tip terminating in SVC.    LUNGS, PLEURA, AIRWAYS: Bilateral groundglass and consolidative opacities, most pronounced at right base, with scattered areas of interlobular septal thickening. Left lower lobe atelectasis. Small bilateral pleural effusions. No pneumothorax. Central airways patent.    THORACIC NODES: Nomediastinal or axillary lymphadenopathy.    MEDIASTINUM/GREAT VESSELS: No pericardial effusion. Cardiomegaly. The aorta and main pulmonary artery are of normal caliber. Post median sternotomy. Left atrial appendage clip is noted.    ABDOMEN/PELVIS:    HEPATOBILIARY: Gallbladder distention with cholelithiasis. Periportal edema, nonspecific. Right hepatic 1.7 cm cyst..    SPLEEN: Approximately 1.6 cm splenic artery aneurysm with rim calcification.    PANCREAS: Unremarkable.    ADRENAL GLANDS: Unremarkable.    KIDNEYS: Symmetric enhancement. No hydronephrosis. Bilateral subcentimeter hypodensities too small to characterize, including a rim calcified hypodensity in the left lower pole.    ABDOMINOPELVIC NODES: No adenopathy.    PELVIC ORGANS: Castellon catheter within a decompressed urinary bladder.    PERITONEUM/MESENTERY/BOWEL: Small volume of ascites. No bowel obstruction or intraperitoneal free air.    BONES/SOFT TISSUES: Acute comminuted fracture of the right humeral neck. Acute comminuted right intertrochanteric fracture. Age-indeterminate fracture of T6 vertebral body, new since prior examination. Acute fractures of multiple anterior bilateral ribs superimposed on chronic rib deformities. Post left hip gamma nail fixation. Anasarca.    OTHER: Atherosclerotic vascular calcification.    IMPRESSION:    No CTA evidence of acute pulmonary embolism.    Acute comminuted fracture of the right humeral neck.    Acute comminuted right intertrochanteric fracture.    Age-indeterminate fracture of T6 vertebral body, new since prior examination.    Multiple bilateral acute and chronic rib fractures.    Bilateral groundglass and consolidative pulmonary opacities, mostly nodular right base. Small bilateral pleural effusions and abdominopelvic ascites. Interlobular septal thickening consistent with pulmonary edema.    Gallbladder distention with cholelithiasis.            JOHN PAUL WHITE M.D., RESIDENT RADIOLOGIST  This document has been electronically signed.  ELLIOT LANDAU MD; Attending Radiologist  This document has been electronically signed. 2021  7:03AM (21 @ 06:15)  CT Angio Chest PE Protocol w/ IV Cont:   EXAM:  CT ABDOMEN AND PELVIS IC        EXAM:  CT ANGIO CHEST PE PROTOCOL IC            PROCEDURE DATE:  2021            INTERPRETATION:  CLINICAL HISTORY / REASON FOR EXAM: Shortness of breath. Fall.    TECHNIQUE: Multislice helical sections were obtained from the thoracic inlet to the lung bases during rapid administration of intravenous contrast using a CTA pulmonary embolism protocol. Thin sections were reconstructed through the pulmonary vasculature. Subsequently, axial images were obtained from the lung bases to the pubic symphysis. Coronal, sagittal and 3D/MIP reformatted images are also submitted.    COMPARISON: CT CHEST/ABDOMEN/PELVIS 7/10/2020.    FINDINGS:    CHEST:    PULMONARY EMBOLUS: No pulmonary embolus..    Tubes/lines/devices: Endotracheal tube terminating above the olya. Enteric tube terminating within the gastric fundus. Right chest tunneled dialysis catheter. Left IJ central venous catheter with tip terminating in SVC.    LUNGS, PLEURA, AIRWAYS: Bilateral groundglass and consolidative opacities, most pronounced at right base, with scattered areas of interlobular septal thickening. Left lower lobe atelectasis. Small bilateral pleural effusions. No pneumothorax. Central airways patent.    THORACIC NODES: Nomediastinal or axillary lymphadenopathy.    MEDIASTINUM/GREAT VESSELS: No pericardial effusion. Cardiomegaly. The aorta and main pulmonary artery are of normal caliber. Post median sternotomy. Left atrial appendage clip is noted.    ABDOMEN/PELVIS:    HEPATOBILIARY: Gallbladder distention with cholelithiasis. Periportal edema, nonspecific. Right hepatic 1.7 cm cyst..    SPLEEN: Approximately 1.6 cm splenic artery aneurysm with rim calcification.    PANCREAS: Unremarkable.    ADRENAL GLANDS: Unremarkable.    KIDNEYS: Symmetric enhancement. No hydronephrosis. Bilateral subcentimeter hypodensities too small to characterize, including a rim calcified hypodensity in the left lower pole.    ABDOMINOPELVIC NODES: No adenopathy.    PELVIC ORGANS: Castellon catheter within a decompressed urinary bladder.    PERITONEUM/MESENTERY/BOWEL: Small volume of ascites. No bowel obstruction or intraperitoneal free air.    BONES/SOFT TISSUES: Acute comminuted fracture of the right humeral neck. Acute comminuted right intertrochanteric fracture. Age-indeterminate fracture of T6 vertebral body, new since prior examination. Acute fractures of multiple anterior bilateral ribs superimposed on chronic rib deformities. Post left hip gamma nail fixation. Anasarca.    OTHER: Atherosclerotic vascular calcification.    IMPRESSION:    No CTA evidence of acute pulmonary embolism.    Acute comminuted fracture of the right humeral neck.    Acute comminuted right intertrochanteric fracture.    Age-indeterminate fracture of T6 vertebral body, new since prior examination.    Multiple bilateral acute and chronic rib fractures.    Bilateral groundglass and consolidative pulmonary opacities, mostly nodular right base. Small bilateral pleural effusions and abdominopelvic ascites. Interlobular septal thickening consistent with pulmonary edema.    Gallbladder distention with cholelithiasis.            JOHN PAUL WHITE M.D., RESIDENT RADIOLOGIST  This document has been electronically signed.  ELLIOT LANDAU MD; Attending Radiologist  This document has been electronically signed. 2021  7:03AM (21 @ 06:10)      CARDIOLOGY TESTING  12 Lead ECG:   Ventricular Rate 62 BPM    Atrial Rate 62 BPM    P-R Interval 206 ms    QRS Duration 170 ms    Q-T Interval 502 ms    QTC Calculation(Bazett) 509 ms    P Axis 92 degrees    R Axis -53 degrees    T Axis 131 degrees    Diagnosis Line Normal sinus rhythm  Right bundle branch block  Left anterior fascicular block  Left ventricular hypertrophy with repolarization abnormality  Abnormal ECG    Confirmed by Sapphire Burns MD ([id number]) on 4/3/2021 9:30:23 AM (21 @ 05:36)  12 Lead ECG:   Ventricular Rate 106 BPM    Atrial Rate 106 BPM    P-R Interval 152 ms    QRS Duration 192 ms    Q-T Interval 428 ms    QTC Calculation(Bazett) 568 ms    R Axis -65 degrees    T Axis 97 degrees    Diagnosis Line Sinus tachycardia  Left axis deviation  Right bundle branch block  Left ventricular hypertrophy with repolarization abnormality ( R in aVL ,   Romhilt-Redman )  Anterior infarct , age undetermined  Abnormal ECG    Confirmed by Joao Parnell ([id number]) on 2021 7:44:28 AM (21 @ 03:30)      MEDICATIONS  aMIOdarone    Tablet 200  aspirin  chewable 81  atorvastatin Oral Tab/Cap - Peds 40  cefTRIAXone   IVPB 1000  chlorhexidine 0.12% Liquid 15  chlorhexidine 4% Liquid 1  dexMEDEtomidine Infusion 0.2  epoetin mana-epbx (RETACRIT) Injectable 05099  levETIRAcetam  IVPB 375  levETIRAcetam  IVPB 250  metolazone Oral Tab/Cap - Peds 5  mupirocin 2% Ointment 1  norepinephrine Infusion 0.05  pantoprazole  Injectable 40  propofol Infusion 10  senna 2  torsemide 80      ANTIBIOTICS:  cefTRIAXone   IVPB 1000 milliGRAM(s) IV Intermittent every 24 hours      All available historical data has been reviewed

## 2021-04-04 NOTE — CHART NOTE - NSCHARTNOTEFT_GEN_A_CORE
Patient noted to have some EKG changes on the monitor and began to have hypotension that once more required low dose levophed. STAT EKG and troponin were ordered. The elevation in troponin was noted. In the setting of acute anemia, there is significant risk of bleed with AC. Will trend troponin for now and follow up AM labs.

## 2021-04-04 NOTE — PROGRESS NOTE ADULT - ASSESSMENT
69 yo F with PMH of CAD s/p CABG (November 2019), HFrEF (EF 20%), AS, ESRD (MWF), bladder prolapse requiring chronic Castellon, Diabetes Type 2 on insulin presents from home s/p mechanical fall.  Multiple traumas: R humeral fracture / R hip fracture / non displaced odontoid fracture/ acute rib fractures/ ? T6 vertebral fracture  Found to be pulseless in trauma bay, PEA vs EF (not on monitor), CPR x2min  Seizurelike activities when sedation held  Extubated 4/2, followed by arrhythmia AF vs VT was reintubated and cardioverted  EF 20-25%  anemia Hg 6.5-> transfused 1u PRBC  seizures on vEEG  COVID negative 4/3    Paroxysmal AF w/RVR s/p DCCV and amiodarone drip, can not rule out VT as the cause of cardiac arrest => in NSR now CHADSVASc 5  Hx of GI bleed  NUBIA clip   Acute decompensated CHF  Ischemic CMP  CAD s/p CABG in 2019  Severe AS  Moderate to severe MR   ESRD on HD  Anemia acute on chronic    con't tele  con't switch Amio to PO 200mg q12h   Metoprolol when BP tolerates  Anemia, work up for any source of bleeding  will need AC if no active bleed  will plan for CRT-D placement once stable, before extubation,, due to C2 fracture, neck immobilization and avoiding re-intubation for a procedure  Keep NPO after MN, will re-access on Monday AM  ID recs appreciated, last case, maintain contact and droplet precautions   Neurology clearance   will follow       71 yo F with PMH of CAD s/p CABG (November 2019), HFrEF (EF 20%), AS, ESRD (MWF), bladder prolapse requiring chronic Castellon, Diabetes Type 2 on insulin presents from home s/p mechanical fall.  Multiple traumas: R humeral fracture / R hip fracture / non displaced odontoid fracture/ acute rib fractures/ ? T6 vertebral fracture  Found to be pulseless in trauma bay, PEA vs EF (not on monitor), CPR x2min  Seizurelike activities when sedation held  Extubated 4/2, followed by arrhythmia AF vs VT was reintubated and cardioverted  EF 20-25%  anemia Hg 6.5-> transfused 1u PRBC  seizures on vEEG  COVID negative 4/3    Paroxysmal AF w/RVR s/p DCCV and amiodarone drip, can not rule out VT as the cause of cardiac arrest => in NSR now CHADSVASc 5  Hx of GI bleed  NUBIA clip   Acute decompensated CHF  Ischemic CMP  CAD s/p CABG in 2019  Severe AS  Moderate to severe MR   ESRD on HD  Anemia acute on chronic    con't tele  con't switch Amio to PO 200mg q12h   Metoprolol when BP tolerates  Anemia, work up for any source of bleeding  will need AC if no active bleed  will plan for CRT-D placement once stable, before extubation,, due to C2 fracture, neck immobilization and avoiding re-intubation for a procedure  Keep NPO after MN, hold TF,will re-access on Monday AM  ID recs appreciated, last case, maintain contact and droplet precautions   Neurology clearance   will follow

## 2021-04-04 NOTE — PROGRESS NOTE ADULT - SUBJECTIVE AND OBJECTIVE BOX
Patient is a 70y old  Female who presents with a chief complaint of Adenovirus (2021 05:19)    Over Night Events:  Remains on MV.  Sedated on Propofol.  On pressors Norepinephrine 0.02    ROS:   All pertinent ROS are negative except HPI     PHYSICAL EXAM  ICU Vital Signs Last 24 Hrs  T(C): 36.7 (2021 08:00), Max: 37.2 (2021 20:00)  T(F): 98.1 (2021 08:00), Max: 99 (2021 20:00)  HR: 90 (2021 08:00) (64 - 102)  ABP: 110/50 (2021 08:00) (90/48 - 140/62)  ABP(mean): 70 (2021 08:00) (60 - 88)  RR: 39 (2021 08:00) (12 - 39)  SpO2: 99% (2021 08:00) (99% - 100%)    CONSTITUTIONAL:   Ill appearing.  NAD    ENT:   Airway patent,   + ETT    EYES:   Pupils equal,   Round and reactive to light.    CARDIAC:   Normal rate,   Regular rhythm.    No edema    Vascular:  Normal systolic impulse  No Carotid bruits    RESPIRATORY:   No wheezing  Bilateral BS  Normal chest expansion  Not tachypneic,  No use of accessory muscles    GASTROINTESTINAL:  Abdomen soft,   Non-tender,   No guarding,     MUSCULOSKELETAL:   Range of motion is not limited,  No clubbing, cyanosis    NEUROLOGICAL:   sedated    SKIN:   Skin normal color for race,   Warm and dry  No evidence of rash.    PSYCHIATRIC:   Sedated    HEMATOLOGICAL:  No cervical  lymphadenopathy.  no inguinal lymphadenopathy        21 @ 07:01  -  21 @ 07:00  --------------------------------------------------------  IN:    Amiodarone: 82.5 mL    Enteral Tube Flush: 250 mL    IV PiggyBack: 150 mL    Norepinephrine: 9.8 mL    PRBCs (Packed Red Blood Cells): 318 mL    Propofol: 269.4 mL  Total IN: 1079.7 mL    OUT:    Dexmedetomidine: 0 mL    FentaNYL: 0 mL    Indwelling Catheter - Urethral (mL): 163 mL  Total OUT: 163 mL    Total NET: 916.7 mL      21 @ 07:01  -  21 @ 09:09  --------------------------------------------------------  IN:    Norepinephrine: 2 mL    Propofol: 20 mL  Total IN: 22 mL    OUT:    Indwelling Catheter - Urethral (mL): 20 mL  Total OUT: 20 mL    Total NET: 2 mL          LABS:                            8.2    5.48  )-----------( 117      ( 2021 04:30 )             25.4                                               04-04    137  |  98  |  26<H>  ----------------------------<  84  3.4<L>   |  24  |  3.4<H>    Ca    8.5      2021 04:30  Phos  4.3     04-04  Mg     2.1     04-04    TPro  5.6<L>  /  Alb  3.2<L>  /  TBili  0.3  /  DBili  x   /  AST  16  /  ALT  13  /  AlkPhos  129<H>  04-04                                             Urinalysis Basic - ( 2021 10:44 )    Color: Yellow / Appearance: Turbid / S.027 / pH: x  Gluc: x / Ketone: Negative  / Bili: Negative / Urobili: <2 mg/dL   Blood: x / Protein: 100 mg/dL / Nitrite: Negative   Leuk Esterase: Large / RBC: 14 /HPF / WBC >720 /HPF   Sq Epi: x / Non Sq Epi: 3 /HPF / Bacteria: Moderate        CARDIAC MARKERS ( 2021 04:30 )  x     / 0.33 ng/mL / x     / x     / x      CARDIAC MARKERS ( 2021 02:01 )  x     / 0.34 ng/mL / x     / x     / x                                                LIVER FUNCTIONS - ( 2021 04:30 )  Alb: 3.2 g/dL / Pro: 5.6 g/dL / ALK PHOS: 129 U/L / ALT: 13 U/L / AST: 16 U/L / GGT: x                                                  Culture - Blood (collected 2021 11:52)  Source: .Blood None  Preliminary Report (2021 23:01):    No growth to date.    Culture - Blood (collected 2021 05:40)  Source: .Blood None  Preliminary Report (2021 14:01):    No growth to date.    Culture - Blood (collected 2021 11:00)  Source: .Blood Blood-Peripheral  Preliminary Report (2021 22:01):    No growth to date.                                                   Mode: AC/ CMV (Assist Control/ Continuous Mandatory Ventilation)  RR (machine): 12  TV (machine): 400  FiO2: 40  PEEP: 5  ITime: 1  MAP: 7  PIP: 25                                      ABG - ( 2021 03:57 )  pH, Arterial: 7.49  pH, Blood: x     /  pCO2: 36    /  pO2: 163   / HCO3: 27    / Base Excess: 3.4   /  SaO2: 99                  MEDICATIONS  (STANDING):  aMIOdarone    Tablet 200 milliGRAM(s) Oral two times a day  aspirin  chewable 81 milliGRAM(s) Oral daily  atorvastatin Oral Tab/Cap - Peds 40 milliGRAM(s) Oral daily  cefTRIAXone   IVPB 1000 milliGRAM(s) IV Intermittent every 24 hours  chlorhexidine 0.12% Liquid 15 milliLiter(s) Oral Mucosa every 12 hours  chlorhexidine 4% Liquid 1 Application(s) Topical <User Schedule>  dexMEDEtomidine Infusion 0.2 MICROgram(s)/kG/Hr (3.3 mL/Hr) IV Continuous <Continuous>  epoetin mana-epbx (RETACRIT) Injectable 82217 Unit(s) IV Push <User Schedule>  levETIRAcetam  IVPB 375 milliGRAM(s) IV Intermittent every 12 hours  levETIRAcetam  IVPB 250 milliGRAM(s) IV Intermittent <User Schedule>  metolazone Oral Tab/Cap - Peds 5 milliGRAM(s) Oral daily  mupirocin 2% Ointment 1 Application(s) Topical two times a day  norepinephrine Infusion 0.05 MICROgram(s)/kG/Min (6.19 mL/Hr) IV Continuous <Continuous>  pantoprazole  Injectable 40 milliGRAM(s) IV Push daily  propofol Infusion 10 MICROgram(s)/kG/Min (3.96 mL/Hr) IV Continuous <Continuous>  senna 2 Tablet(s) Oral daily  torsemide 80 milliGRAM(s) Oral two times a day    MEDICATIONS  (PRN):      New X-rays reviewed:                                                                                  ECHO    CXR interpreted by me:       Patient is a 70y old  Female who presents with a chief complaint of Adenovirus (2021 05:19)    Over Night Events:  Remains on MV.  Sedated on Propofol.  On pressors Norepinephrine 0.02    ROS:   All pertinent ROS are negative except HPI     PHYSICAL EXAM  ICU Vital Signs Last 24 Hrs  T(C): 36.7 (2021 08:00), Max: 37.2 (2021 20:00)  T(F): 98.1 (2021 08:00), Max: 99 (2021 20:00)  HR: 90 (2021 08:00) (64 - 102)  ABP: 110/50 (2021 08:00) (90/48 - 140/62)  ABP(mean): 70 (2021 08:00) (60 - 88)  RR: 39 (2021 08:00) (12 - 39)  SpO2: 99% (2021 08:00) (99% - 100%)    CONSTITUTIONAL:   Ill appearing.  NAD    ENT:   Airway patent,   + ETT    EYES:   Pupils equal,   Round and reactive to light.    CARDIAC:   Normal rate,   Irregular rhythm.    No edema    Vascular:  Normal systolic impulse  No Carotid bruits    RESPIRATORY:   No wheezing  Bilateral BS  Normal chest expansion  Not tachypneic,  No use of accessory muscles    GASTROINTESTINAL:  Abdomen soft,   Non-tender,   No guarding,     MUSCULOSKELETAL:   Range of motion is not limited,  No clubbing, cyanosis    NEUROLOGICAL:   sedated  Follows commands off sedation    SKIN:   Skin normal color for race,   Warm and dry  No evidence of rash.    PSYCHIATRIC:   Sedated    HEMATOLOGICAL:  No cervical  lymphadenopathy.  no inguinal lymphadenopathy        21 @ 07:01  -  21 @ 07:00  --------------------------------------------------------  IN:    Amiodarone: 82.5 mL    Enteral Tube Flush: 250 mL    IV PiggyBack: 150 mL    Norepinephrine: 9.8 mL    PRBCs (Packed Red Blood Cells): 318 mL    Propofol: 269.4 mL  Total IN: 1079.7 mL    OUT:    Dexmedetomidine: 0 mL    FentaNYL: 0 mL    Indwelling Catheter - Urethral (mL): 163 mL  Total OUT: 163 mL    Total NET: 916.7 mL      21 @ 07:01  -  21 @ 09:09  --------------------------------------------------------  IN:    Norepinephrine: 2 mL    Propofol: 20 mL  Total IN: 22 mL    OUT:    Indwelling Catheter - Urethral (mL): 20 mL  Total OUT: 20 mL    Total NET: 2 mL          LABS:                            8.2    5.48  )-----------( 117      ( 2021 04:30 )             25.4                                               04-    137  |  98  |  26<H>  ----------------------------<  84  3.4<L>   |  24  |  3.4<H>    Ca    8.5      2021 04:30  Phos  4.3     04-04  Mg     2.1     -    TPro  5.6<L>  /  Alb  3.2<L>  /  TBili  0.3  /  DBili  x   /  AST  16  /  ALT  13  /  AlkPhos  129<H>  04-04      Urinalysis Basic - ( 2021 10:44 )  Color: Yellow / Appearance: Turbid / S.027 / pH: x  Gluc: x / Ketone: Negative  / Bili: Negative / Urobili: <2 mg/dL   Blood: x / Protein: 100 mg/dL / Nitrite: Negative   Leuk Esterase: Large / RBC: 14 /HPF / WBC >720 /HPF   Sq Epi: x / Non Sq Epi: 3 /HPF / Bacteria: Moderate      CARDIAC MARKERS ( 2021 04:30 )  x     / 0.33 ng/mL / x     / x     / x      CARDIAC MARKERS ( 2021 02:01 )  x     / 0.34 ng/mL / x     / x     / x        LIVER FUNCTIONS - ( 2021 04:30 )  Alb: 3.2 g/dL / Pro: 5.6 g/dL / ALK PHOS: 129 U/L / ALT: 13 U/L / AST: 16 U/L / GGT: x                                              Culture - Blood (collected 2021 11:52)  Source: .Blood None  Preliminary Report (2021 23:01):    No growth to date.    Culture - Blood (collected 2021 05:40)  Source: .Blood None  Preliminary Report (2021 14:01):    No growth to date.    Culture - Blood (collected 2021 11:00)  Source: .Blood Blood-Peripheral  Preliminary Report (2021 22:01):    No growth to date.                                                Mode: AC/ CMV (Assist Control/ Continuous Mandatory Ventilation)  RR (machine): 12  TV (machine): 400  FiO2: 40  PEEP: 5  ITime: 1  MAP: 7  PIP: 25     ABG - ( 2021 03:57 )  pH, Arterial: 7.49  pH, Blood: x     /  pCO2: 36    /  pO2: 163   / HCO3: 27    / Base Excess: 3.4   /  SaO2: 99          MEDICATIONS  (STANDING):  aMIOdarone    Tablet 200 milliGRAM(s) Oral two times a day  aspirin  chewable 81 milliGRAM(s) Oral daily  atorvastatin Oral Tab/Cap - Peds 40 milliGRAM(s) Oral daily  cefTRIAXone   IVPB 1000 milliGRAM(s) IV Intermittent every 24 hours  chlorhexidine 0.12% Liquid 15 milliLiter(s) Oral Mucosa every 12 hours  chlorhexidine 4% Liquid 1 Application(s) Topical <User Schedule>  dexMEDEtomidine Infusion 0.2 MICROgram(s)/kG/Hr (3.3 mL/Hr) IV Continuous <Continuous>  epoetin mana-epbx (RETACRIT) Injectable 88517 Unit(s) IV Push <User Schedule>  levETIRAcetam  IVPB 375 milliGRAM(s) IV Intermittent every 12 hours  levETIRAcetam  IVPB 250 milliGRAM(s) IV Intermittent <User Schedule>  metolazone Oral Tab/Cap - Peds 5 milliGRAM(s) Oral daily  mupirocin 2% Ointment 1 Application(s) Topical two times a day  norepinephrine Infusion 0.05 MICROgram(s)/kG/Min (6.19 mL/Hr) IV Continuous <Continuous>  pantoprazole  Injectable 40 milliGRAM(s) IV Push daily  propofol Infusion 10 MICROgram(s)/kG/Min (3.96 mL/Hr) IV Continuous <Continuous>  senna 2 Tablet(s) Oral daily  torsemide 80 milliGRAM(s) Oral two times a day    MEDICATIONS  (PRN):      New X-rays reviewed:                                                                                  ECHO    CXR interpreted by me:  CARLOS PATRICK R Udall

## 2021-04-04 NOTE — CHART NOTE - NSCHARTNOTEFT_GEN_A_CORE
Discussed with Orthopedic surgery resident Dr. Sheehan who informed me ortho would like to perform surgery early this week preferably while pt is still intubated. Pt will need clearance from all current teams involved in the care of the patient- cardio, pulm etc.

## 2021-04-04 NOTE — PROGRESS NOTE ADULT - SUBJECTIVE AND OBJECTIVE BOX
Neurology Follow up note    Name  GUS WILBURN    HPI:  69 yo F with PMH of CAD s/p CABG (November 2019), HFrEF (EF 20%), AS, ESRD (MWF), bladder prolapse requiring chronic Castellon, Diabetes Type 2 on insulin presents from home s/p mechanical fall. When the history was taken in the emergency department, she The denied any dizziness/ palpitations/ aura/vertigo. She reported non-radiating R shoulder pain and non-radiating R hip pain on arrival. She was found to have R humeral fracture / R hip fracture / non displaced odontoid fracture/ acute rib fractures/ ? T6 vertebral fracture.     In the emergency department, while the patient was receiving trauma work up she was found pulseless by a PCA in the room ( she was not on the monitor at that time) and had a cardiac arrest. ROSC was achieved after 2 minutes of CPR (1 epinephrine was given and 1 calcium chloride iv). Patient was intubated and sedated. EKG showed RBBB with wide complex tachycardia. Per family at the bedside, the patient had her last hemodialysis yesterday and has been relatively non compliant with her medications and medical care at home. Unable to obtain ros as patient is sedated.     T(C): 35 , HR: 60, BP: 158/67, RR: 20, SpO2: 99% vented  Labs: d-dimer ~ 4700, p-bnp 70,000, trop (0.20 <--0.17)  ABG pH, Arterial: 7.45, CO2: 36, Arterial O2: 63 , HCO3: 25 , Base Excess: 1.4 , SaO2: 94      CTH (-)          (01 Apr 2021 07:42)      Interval History -  VEEG for the past 48 hours showed no seizures but has bifrontal sharp waves. Currently on precedex, Eyes opens and follows commands rarely.       Vital Signs Last 24 Hrs  T(C): 37.2 (04 Apr 2021 16:00), Max: 37.2 (03 Apr 2021 20:00)  T(F): 98.9 (04 Apr 2021 16:00), Max: 99 (03 Apr 2021 20:00)  HR: 62 (04 Apr 2021 16:00) (54 - 112)  BP: --  BP(mean): --  RR: 61 (04 Apr 2021 16:00) (12 - 136)  SpO2: 100% (04 Apr 2021 16:00) (99% - 100%)  ICU Vital Signs Last 24 Hrs  T(C): 37.2 (04 Apr 2021 16:00), Max: 37.2 (03 Apr 2021 20:00)  T(F): 98.9 (04 Apr 2021 16:00), Max: 99 (03 Apr 2021 20:00)  HR: 62 (04 Apr 2021 16:00) (54 - 112)  BP: --  BP(mean): --  ABP: 134/70 (04 Apr 2021 16:00) (90/48 - 178/84)  ABP(mean): 96 (04 Apr 2021 16:00) (60 - 116)  RR: 61 (04 Apr 2021 16:00) (12 - 136)  SpO2: 100% (04 Apr 2021 16:00) (99% - 100%)          Neurological Exam:   General: ill-appearing, mechanically vented, female of appropriately stated age, sedated on Precedex    Mental status: Mechanically vented, sedated on Precedex. Eyes open and rarely follows commands.   Cranial Nerves: Pupils 2mm sluggish bilaterally.  No gaze preference or nystagmus noted. Face grossly symmetrical with no loss of nasolabial folds.   Motor:  Withdrawal to noxious stimuli bilaterally throughout, left > right      Medications  aMIOdarone    Tablet 200 milliGRAM(s) Oral two times a day  aspirin  chewable 81 milliGRAM(s) Oral daily  atorvastatin Oral Tab/Cap - Peds 40 milliGRAM(s) Oral daily  BACItracin   Ointment 1 Application(s) Topical two times a day  cefTRIAXone   IVPB 1000 milliGRAM(s) IV Intermittent every 24 hours  chlorhexidine 0.12% Liquid 15 milliLiter(s) Oral Mucosa every 12 hours  chlorhexidine 4% Liquid 1 Application(s) Topical <User Schedule>  dexMEDEtomidine Infusion 0.2 MICROgram(s)/kG/Hr IV Continuous <Continuous>  diVALproex  milliGRAM(s) Oral every 12 hours  epoetin mana-epbx (RETACRIT) Injectable 35785 Unit(s) IV Push <User Schedule>  fentaNYL    Injectable 25 MICROGram(s) IV Push every 4 hours PRN  levETIRAcetam  IVPB 375 milliGRAM(s) IV Intermittent every 12 hours  levETIRAcetam  IVPB 250 milliGRAM(s) IV Intermittent <User Schedule>  metolazone Oral Tab/Cap - Peds 5 milliGRAM(s) Oral daily  mupirocin 2% Ointment 1 Application(s) Topical two times a day  norepinephrine Infusion 0.05 MICROgram(s)/kG/Min IV Continuous <Continuous>  pantoprazole  Injectable 40 milliGRAM(s) IV Push daily  senna 2 Tablet(s) Oral daily  torsemide 80 milliGRAM(s) Oral two times a day      Lab                        8.2    5.48  )-----------( 117      ( 04 Apr 2021 04:30 )             25.4     04-04    137  |  98  |  26<H>  ----------------------------<  84  3.4<L>   |  24  |  3.4<H>    Ca    8.5      04 Apr 2021 04:30  Phos  4.3     04-04  Mg     2.1     04-04    TPro  5.6<L>  /  Alb  3.2<L>  /  TBili  0.3  /  DBili  x   /  AST  16  /  ALT  13  /  AlkPhos  129<H>  04-04    CAPILLARY BLOOD GLUCOSE      POCT Blood Glucose.: 112 mg/dL (04 Apr 2021 12:19)  POCT Blood Glucose.: 90 mg/dL (04 Apr 2021 06:14)  POCT Blood Glucose.: 104 mg/dL (03 Apr 2021 22:33)  POCT Blood Glucose.: 96 mg/dL (03 Apr 2021 18:39)    LIVER FUNCTIONS - ( 04 Apr 2021 04:30 )  Alb: 3.2 g/dL / Pro: 5.6 g/dL / ALK PHOS: 129 U/L / ALT: 13 U/L / AST: 16 U/L / GGT: x                 Plan

## 2021-04-04 NOTE — PROGRESS NOTE ADULT - ATTENDING COMMENTS
Complicated situation  Syncope/mechanical fall with odontoid fracture  Episode of WCT which appears to be AF/RVR. However, possibility of VT can't be ruled out with certainty  Ischemic CM with fluctuating EF with multiple and recent EF <35%    - Continue with amiodarone for now. Can switch to PO if PO feeding are tolerated.  - Patient not on OAC due to recent trauma and history of significant GI bleeding (?)- will need GI work-up.  - We will consider placement of CRT-D if moving in right direction clinically. Prefer to do it PRIOR to extubation to avoid re-intubation and minimal movement of neck. Will need neuro and critical care clearance  - Will be done as a last case in EP lab as per ID.  - Start metoprolol when off pressor  - Management of anemia and fluid status as per critical care team.

## 2021-04-04 NOTE — PROGRESS NOTE ADULT - ASSESSMENT
70-year-old female with PMHx CAD, S/P CABG (11/2019), HFrEF (EF 20%), AS, ESRD, HMD M/W/F, bladder prolapse, requiring chronic montejo, T2DM, on Insulin, BIBA 2/2 mechanical fall. Trauma workup revealed Rt humeral fracture, Rt hip fracture, non-displaced odontoid fracture, acute rib fractures, and possible T6 vertebral fracture. While in ED, s/p cardiac arrest of unknown downtime d/t unmonitored, however ROSC achieved 2 minutes after initiation of CPR. Intubated for airway protection, started on sedation and pressors. Post-cardiac arrest, noted seizure-like episode, with right gaze preference and clonic jerking of extremities while decreasing sedation for CPAP trials. Symptoms lasting approximately 5 minutes, that resolved with Versed 2mg IV. Another similar episode witnessed by family, lasting approximately one minutes, that resolved with no intervention. Initial CTH, 4/1/2021, negative for acute intracranial pathology, with subsequent imaging post seizure revealing no change from previous CTH. Video EEG placed 4/2/2021. vEEG, 4/3/2021, consistent for metabolic process, with bifrontal sharps but no seizure activity.    Plan:  #Neuro:  - Keep video EEG for another 24 hours.   - Neuro checks q2hrs  - Seizure precautions  - Continue Keppra 375mg BID with additional 250mg dose after dialysis  - Add Depakote 250 mg BID   - Continue ASA 81mg PO q24hrs  - Continue Atorvastatin 40mg PO q24hrs  - Minimize sedation; currently on Precedex, Could change to Fentanyl prn

## 2021-04-04 NOTE — PROGRESS NOTE ADULT - ASSESSMENT
Impression:  cardiac arrest unknown etiology   ?? arrythmia   multiple fx , hip, humerus , neck   shock ?? cardiogenic   seizure   HD     PLAN:    CNS: continue keppra   follow EEG   on propofol    HEENT: oral care     PULMONARY: vent changes: none       CARDIOVASCULAR: cardiology consult   echo   hold bp med for now   taper pressors     GI: GI prophylaxis. npo for extubation     RENAL: HD at bed side     INFECTIOUS DISEASE:   pan cx   procal   d/c cefepime and leonides  start Rocephin     HEMATOLOGICAL:  DVT prophylaxis. follow h/h if dropping ct abd , pelvis no contrast     ENDOCRINE:  Follow up FS.  Insulin protocol if needed. Impression:  Acute Hypoxic Respiratory Failure  Cardiogenic shock  SP Cardiac Arrest  Ventricular Fibrillation  SVT vs Ventricular Tachycardia SP shock  AFib  Anemia s/p 1U PRBC  Code Trauma, Multiple Fractures (hip, humerus, cervical)   Possible seizure  RIC on HD      RECOMMENDATIONS:    CNS:  D/c Propofol.  SAT.  Precedex if needed.  Fentanyl prn for pain.  FU VEEG.  c/w Keppra, check levels.  FU Neurology.  CT Head on 4/1 negative.    HEENT:  oral care    PULMONARY:  HOB at 45 degrees.  Target POx 92 - 96%.   Wean O2 as tolerated.   SBT.     CARDIOVASCULAR:   D/c Levophed.  BNP.  ECHO.  Cardiology FU.  Keep I = O.    GI: GI prophylaxis.  Hold feeds.    RENAL: HD per Renal.  FU lytes.  Replete as necessary.    INFECTIOUS DISEASE:   Finish ABX course.  Procalcitonin.  FU cultures.     HEMATOLOGICAL:  DVT prophylaxis.  FU CBC & PTT.    ENDOCRINE:  Follow up FS.  Insulin protocol if needed.    MUSCULOSKELETAL: bed rest    Monitor in MICU

## 2021-04-04 NOTE — PROGRESS NOTE ADULT - ASSESSMENT
1. ESRD on HD, s/p HD on sat, tolerated ok, next HD on Tuesday, f/u k level ( low k in am)    2, s/p arrest, CAD, on vent and pressor, continue IV abx.    3,  s/p fall , Odontoid fracture. Neurosurgery following, R hip / R humerus fracture. Ortho following.    poor prognosis  d/w CCU staff

## 2021-04-05 NOTE — CONSULT NOTE ADULT - SUBJECTIVE AND OBJECTIVE BOX
GUS WILBURN [id number]  70y Female  4d    HPI:  71 yo F with PMH of CAD s/p CABG (November 2019), HFrEF (EF 20%), AS, ESRD (MWF), bladder prolapse requiring chronic Castellon, Diabetes Type 2 on insulin presents from home s/p mechanical fall. When the history was taken in the emergency department, she The denied any dizziness/ palpitations/ aura/vertigo. She reported non-radiating R shoulder pain and non-radiating R hip pain on arrival. She was found to have R humeral fracture / R hip fracture / non displaced odontoid fracture/ acute rib fractures/ ? T6 vertebral fracture.     In the emergency department, while the patient was receiving trauma work up she was found pulseless by a PCA in the room ( she was not on the monitor at that time) and had a cardiac arrest. ROSC was achieved after 2 minutes of CPR (1 epinephrine was given and 1 calcium chloride iv). Patient was intubated and sedated. EKG showed RBBB with wide complex tachycardia. Per family at the bedside, the patient had her last hemodialysis yesterday and has been relatively non compliant with her medications and medical care at home. Unable to obtain ros as patient is sedated.     T(C): 35 , HR: 60, BP: 158/67, RR: 20, SpO2: 99% vented  Labs: d-dimer ~ 4700, p-bnp 70,000, trop (0.20 <--0.17)  ABG pH, Arterial: 7.45, CO2: 36, Arterial O2: 63 , HCO3: 25 , Base Excess: 1.4 , SaO2: 94      CTH (-)   (01 Apr 2021 07:42)    PAST MEDICAL & SURGICAL HISTORY:  Diabetes  Congestive heart failure (CHF)  Pleural effusion due to congestive heart failure  End stage renal disease  on dialysis Mon, Wed, Fr  Uterine prolapse  Chronic indwelling Castellon catheter  History of repair of hip fracture  2019  S/P CABG (coronary artery bypass graft)  11/2019  History of thoracentesis  Chronic indwelling Castellon catheter    MEDICATIONS  (STANDING):  ALBUTerol    90 MICROgram(s) HFA Inhaler 1 Puff(s) Inhalation every 4 hours  albuterol/ipratropium for Nebulization 3 milliLiter(s) Nebulizer every 6 hours  aMIOdarone    Tablet 200 milliGRAM(s) Oral two times a day  aspirin  chewable 81 milliGRAM(s) Oral daily  atorvastatin Oral Tab/Cap - Peds 40 milliGRAM(s) Oral daily  BACItracin   Ointment 1 Application(s) Topical two times a day  cefTRIAXone   IVPB 1000 milliGRAM(s) IV Intermittent every 24 hours  chlorhexidine 0.12% Liquid 15 milliLiter(s) Oral Mucosa every 12 hours  chlorhexidine 4% Liquid 1 Application(s) Topical <User Schedule>  dexMEDEtomidine Infusion 0.2 MICROgram(s)/kG/Hr (3.3 mL/Hr) IV Continuous <Continuous>  dextrose 5% + sodium chloride 0.9%. 1000 milliLiter(s) (50 mL/Hr) IV Continuous <Continuous>  diVALproex Sprinkle 250 milliGRAM(s) Oral two times a day  doxercalciferol Injectable 2 MICROGram(s) IV Push <User Schedule>  epoetin mana-epbx (RETACRIT) Injectable 58315 Unit(s) IV Push <User Schedule>  fentaNYL   Infusion 0.5 MICROgram(s)/kG/Hr (2.9 mL/Hr) IV Continuous <Continuous>  heparin   Injectable 5000 Unit(s) SubCutaneous every 8 hours  levETIRAcetam  IVPB 375 milliGRAM(s) IV Intermittent every 12 hours  levETIRAcetam  IVPB 250 milliGRAM(s) IV Intermittent <User Schedule>  mupirocin 2% Ointment 1 Application(s) Topical two times a day  pantoprazole  Injectable 40 milliGRAM(s) IV Push daily  senna 2 Tablet(s) Oral daily    Allergies  No Known Allergies    REVIEW OF SYSTEMS    [ ] A ten-point review of systems was otherwise negative except as noted.  [ X ] Due to altered mental status/intubation, subjective information were not able to be obtained from the patient. History was obtained, to the extent possible, from review of the chart and collateral sources of information.    Vital Signs Last 24 Hrs  T(C): 36.3 (05 Apr 2021 15:00), Max: 37.1 (05 Apr 2021 00:00)  T(F): 97.4 (05 Apr 2021 15:00), Max: 98.8 (05 Apr 2021 00:00)  HR: 54 (05 Apr 2021 17:00) (48 - 66)  RR: 12 (05 Apr 2021 17:00) (12 - 58)  SpO2: 100% (05 Apr 2021 17:00) (99% - 100%)    PHYSICAL EXAM:  GENERAL: NAD, well-appearing  CHEST/LUNG: Clear to auscultation bilaterally  HEART: Regular rate and rhythm  ABDOMEN: Soft, Nontender, Nondistended;   EXTREMITIES:  No clubbing, cyanosis, or edema      LABS:  Labs:  CAPILLARY BLOOD GLUCOSE      POCT Blood Glucose.: 109 mg/dL (05 Apr 2021 16:32)  POCT Blood Glucose.: 115 mg/dL (05 Apr 2021 07:20)  POCT Blood Glucose.: 118 mg/dL (05 Apr 2021 06:32)  POCT Blood Glucose.: 130 mg/dL (04 Apr 2021 23:52)  POCT Blood Glucose.: 150 mg/dL (04 Apr 2021 18:31)                        8.2    4.51  )-----------( 102      ( 05 Apr 2021 04:30 )             26.6       Auto Neutrophil %: 75.0 % (04-05-21 @ 04:30)  Auto Immature Granulocyte %: 0.4 % (04-05-21 @ 04:30)    04-05    135  |  97<L>  |  33<H>  ----------------------------<  115<H>  3.9   |  22  |  3.8<H>    Calcium, Total Serum: 8.6 mg/dL (04-05-21 @ 04:30)    LFTs:             5.5  | 0.3  | 13       ------------------[139     ( 05 Apr 2021 04:30 )  3.2  | x    | 9           Lipase:x      Amylase:x         Blood Gas Arterial, Lactate: 0.5 mmoL/L (04-05-21 @ 16:30)  Blood Gas Arterial, Lactate: 0.6 mmoL/L (04-05-21 @ 04:10)  Blood Gas Arterial, Lactate: 0.6 mmoL/L (04-04-21 @ 17:16)  Blood Gas Arterial, Lactate: 0.7 mmoL/L (04-04-21 @ 03:57)  Blood Gas Arterial, Lactate: 0.7 mmoL/L (04-03-21 @ 14:46)  Blood Gas Arterial, Lactate: 0.8 mmoL/L (04-03-21 @ 04:06)    ABG - ( 05 Apr 2021 16:30 )  pH: 7.39  /  pCO2: 40    /  pO2: 134   / HCO3: 24    / Base Excess: -0.6  /  SaO2: 98              ABG - ( 05 Apr 2021 04:10 )  pH: 7.41  /  pCO2: 38    /  pO2: 184   / HCO3: 24    / Base Excess: -0.6  /  SaO2: 99              ABG - ( 04 Apr 2021 17:16 )  pH: 7.42  /  pCO2: 39    /  pO2: 117   / HCO3: 25    / Base Excess: 1.0   /  SaO2: 98                Coags:    CARDIAC MARKERS ( 04 Apr 2021 04:30 )  x     / 0.33 ng/mL / x     / x     / x      CARDIAC MARKERS ( 04 Apr 2021 02:01 )  x     / 0.34 ng/mL / x     / x     / x          Serum Pro-Brain Natriuretic Peptide: >84325 pg/mL (04-01-21 @ 08:56)              RADIOLOGY & ADDITIONAL STUDIES:   GUS WILBURN [id number]  70y Female  4d    HPI:  71 yo F with PMH of CAD s/p CABG (November 2019), HFrEF (EF 20%), AS, ESRD (MWF), bladder prolapse requiring chronic Castellon, Diabetes Type 2 on insulin presents from home s/p mechanical fall. When the history was taken in the emergency department, she The denied any dizziness/ palpitations/ aura/vertigo. She reported non-radiating R shoulder pain and non-radiating R hip pain on arrival. She was found to have R humeral fracture / R hip fracture / non displaced odontoid fracture/ acute rib fractures/ ? T6 vertebral fracture.     In the emergency department, while the patient was receiving trauma work up she was found pulseless by a PCA in the room ( she was not on the monitor at that time) and had a cardiac arrest. ROSC was achieved after 2 minutes of CPR (1 epinephrine was given and 1 calcium chloride iv). Patient was intubated and sedated. EKG showed RBBB with wide complex tachycardia. Per family at the bedside, the patient had her last hemodialysis yesterday and has been relatively non compliant with her medications and medical care at home. Unable to obtain ros as patient is sedated.     T(C): 35 , HR: 60, BP: 158/67, RR: 20, SpO2: 99% vented  Labs: d-dimer ~ 4700, p-bnp 70,000, trop (0.20 <--0.17)  ABG pH, Arterial: 7.45, CO2: 36, Arterial O2: 63 , HCO3: 25 , Base Excess: 1.4 , SaO2: 94      CTH (-)   (01 Apr 2021 07:42)    Reason for Consult: Patient is s/p incision and drainage of perirectal abscess on 3/3/21, findings consistent with left perirectal abscess with 100cc of purulent drainage with packing placed. Surgery consulted for evaluation of wound and recommendations regarding care.     PAST MEDICAL & SURGICAL HISTORY:  Diabetes  Congestive heart failure (CHF)  Pleural effusion due to congestive heart failure  End stage renal disease  on dialysis Mon, Wed, Fr  Uterine prolapse  Chronic indwelling Castellon catheter  History of repair of hip fracture  2019  S/P CABG (coronary artery bypass graft)  11/2019  History of thoracentesis  Chronic indwelling Castellon catheter    MEDICATIONS  (STANDING):  ALBUTerol    90 MICROgram(s) HFA Inhaler 1 Puff(s) Inhalation every 4 hours  albuterol/ipratropium for Nebulization 3 milliLiter(s) Nebulizer every 6 hours  aMIOdarone    Tablet 200 milliGRAM(s) Oral two times a day  aspirin  chewable 81 milliGRAM(s) Oral daily  atorvastatin Oral Tab/Cap - Peds 40 milliGRAM(s) Oral daily  BACItracin   Ointment 1 Application(s) Topical two times a day  cefTRIAXone   IVPB 1000 milliGRAM(s) IV Intermittent every 24 hours  chlorhexidine 0.12% Liquid 15 milliLiter(s) Oral Mucosa every 12 hours  chlorhexidine 4% Liquid 1 Application(s) Topical <User Schedule>  dexMEDEtomidine Infusion 0.2 MICROgram(s)/kG/Hr (3.3 mL/Hr) IV Continuous <Continuous>  dextrose 5% + sodium chloride 0.9%. 1000 milliLiter(s) (50 mL/Hr) IV Continuous <Continuous>  diVALproex Sprinkle 250 milliGRAM(s) Oral two times a day  doxercalciferol Injectable 2 MICROGram(s) IV Push <User Schedule>  epoetin mana-epbx (RETACRIT) Injectable 86430 Unit(s) IV Push <User Schedule>  fentaNYL   Infusion 0.5 MICROgram(s)/kG/Hr (2.9 mL/Hr) IV Continuous <Continuous>  heparin   Injectable 5000 Unit(s) SubCutaneous every 8 hours  levETIRAcetam  IVPB 375 milliGRAM(s) IV Intermittent every 12 hours  levETIRAcetam  IVPB 250 milliGRAM(s) IV Intermittent <User Schedule>  mupirocin 2% Ointment 1 Application(s) Topical two times a day  pantoprazole  Injectable 40 milliGRAM(s) IV Push daily  senna 2 Tablet(s) Oral daily    Allergies  No Known Allergies    REVIEW OF SYSTEMS    [ ] A ten-point review of systems was otherwise negative except as noted.  [ X ] Due to altered mental status/intubation, subjective information were not able to be obtained from the patient. History was obtained, to the extent possible, from review of the chart and collateral sources of information.    Vital Signs Last 24 Hrs  T(C): 36.3 (05 Apr 2021 15:00), Max: 37.1 (05 Apr 2021 00:00)  T(F): 97.4 (05 Apr 2021 15:00), Max: 98.8 (05 Apr 2021 00:00)  HR: 54 (05 Apr 2021 17:00) (48 - 66)  RR: 12 (05 Apr 2021 17:00) (12 - 58)  SpO2: 100% (05 Apr 2021 17:00) (99% - 100%)    PHYSICAL EXAM:  GENERAL: Intubated, agitated   CHEST/LUNG: Symmetrical chest rise, ventilated   HEART: Regular rate and rhythm  ABDOMEN: Soft, nondistended, unable to assess tenderness   EXTREMITIES:  No clubbing, cyanosis, or edema  RECTAL: ~3 cm incision, ~1 cm deep, well-healing granulation tissue at the base of the wound, no evidence of purulent drainage     LABS:    POCT Blood Glucose.: 109 mg/dL (05 Apr 2021 16:32)  POCT Blood Glucose.: 115 mg/dL (05 Apr 2021 07:20)  POCT Blood Glucose.: 118 mg/dL (05 Apr 2021 06:32)  POCT Blood Glucose.: 130 mg/dL (04 Apr 2021 23:52)  POCT Blood Glucose.: 150 mg/dL (04 Apr 2021 18:31)                        8.2    4.51  )-----------( 102      ( 05 Apr 2021 04:30 )             26.6       Auto Neutrophil %: 75.0 % (04-05-21 @ 04:30)  Auto Immature Granulocyte %: 0.4 % (04-05-21 @ 04:30)    04-05    135  |  97<L>  |  33<H>  ----------------------------<  115<H>  3.9   |  22  |  3.8<H>    Calcium, Total Serum: 8.6 mg/dL (04-05-21 @ 04:30)    LFTs:             5.5  | 0.3  | 13       ------------------[139     ( 05 Apr 2021 04:30 )  3.2  | x    | 9           Blood Gas Arterial, Lactate: 0.5 mmoL/L (04-05-21 @ 16:30)  Blood Gas Arterial, Lactate: 0.6 mmoL/L (04-05-21 @ 04:10)  Blood Gas Arterial, Lactate: 0.6 mmoL/L (04-04-21 @ 17:16)  Blood Gas Arterial, Lactate: 0.7 mmoL/L (04-04-21 @ 03:57)  Blood Gas Arterial, Lactate: 0.7 mmoL/L (04-03-21 @ 14:46)  Blood Gas Arterial, Lactate: 0.8 mmoL/L (04-03-21 @ 04:06)    ABG - ( 05 Apr 2021 16:30 )  pH: 7.39  /  pCO2: 40    /  pO2: 134   / HCO3: 24    / Base Excess: -0.6  /  SaO2: 98        ABG - ( 05 Apr 2021 04:10 )  pH: 7.41  /  pCO2: 38    /  pO2: 184   / HCO3: 24    / Base Excess: -0.6  /  SaO2: 99        ABG - ( 04 Apr 2021 17:16 )  pH: 7.42  /  pCO2: 39    /  pO2: 117   / HCO3: 25    / Base Excess: 1.0   /  SaO2: 98        Coags:    CARDIAC MARKERS ( 04 Apr 2021 04:30 )  x     / 0.33 ng/mL / x     / x     / x      CARDIAC MARKERS ( 04 Apr 2021 02:01 )  x     / 0.34 ng/mL / x     / x     / x        Serum Pro-Brain Natriuretic Peptide: >75599 pg/mL (04-01-21 @ 08:56)    RADIOLOGY & ADDITIONAL STUDIES:  < from: Xray Chest 1 View- PORTABLE-Routine (Xray Chest 1 View- PORTABLE-Routine in AM.) (04.05.21 @ 05:46) >  Impression:  Bilateral opacities, decreased. Stable cardiomegaly/left pleural effusion.    < from: Xray Humerus, Right (04.01.21 @ 03:48) >  Findings/  impression:  Impacted comminuted fracture right humeral neck.    < from: Xray Pelvis AP only (04.01.21 @ 03:49) >  IMPRESSION:  Acute right intertrochanteric fracture.    < from: Xray Shoulder 2 Views, Right (04.01.21 @ 03:52) >  FINDINGS/  IMPRESSION:  Acute impacted right humeral neck fracture.  Partially imaged right lung interstitial opacities and tunneled dialysis catheter right IJ.    < from: Xray Femur 2 Views, Right (04.01.21 @ 03:56) >  FINDINGS/  IMPRESSION:  Acute angulated right intertrochanteric fracture with subtrochanteric extension.    < from: CT Cervical Spine No Cont (04.01.21 @ 06:06) >  IMPRESSION:  Lucency through base of odontoid extending into the lateral masses of C2 suggesting nondisplaced fracture. Small volume of blood within anterior extraduralspace. Consider further evaluation with MRI as clinically warranted.  Comminuted fracture right humeral neck, visualized on .    < from: CT Angio Chest PE Protocol w/ IV Cont (04.01.21 @ 06:10) >  IMPRESSION:  No CTA evidence of acute pulmonary embolism.  Acute comminuted fracture of the right humeral neck.  Acute comminuted right intertrochanteric fracture.  Age-indeterminate fracture of T6 vertebral body, new since prior examination.  Multiple bilateral acute and chronic rib fractures.  Bilateral groundglass and consolidative pulmonary opacities, mostly nodular right base. Small bilateral pleural effusions and abdominopelvic ascites. Interlobular septal thickening consistent with pulmonary edema.  Gallbladder distention with cholelithiasis.

## 2021-04-05 NOTE — PROGRESS NOTE ADULT - ATTENDING COMMENTS
History, events, data and scans reviewed. Patient examined at bedside. I agree and approves plan of care as outlined above.  Improving neurologically, awake/good gag & cough: good weaning parameters from neurologic stand point. Continue keppra/depakote.  Discussed with ICU team.

## 2021-04-05 NOTE — PROGRESS NOTE ADULT - ASSESSMENT
71 yo F with PMH of CAD s/p CABG (November 2019), HFrEF (EF 20%), AS, ESRD (MWF), bladder prolapse requiring chronic Castellon, Diabetes Type 2 on insulin.    Acute on chronic systolic HF/ ESRD on HD/ Respiratory distress / possible ventricular arrhythmia     Continue MV  SBT /extubation per PCCM   Plan for CRT-D tomorrow   Continue fluid removal via HD - nephro recs appreciated   Continue to hold HF meds   Discussed with primary team   Will continue to follow

## 2021-04-05 NOTE — PROGRESS NOTE ADULT - SUBJECTIVE AND OBJECTIVE BOX
Specialty: Neuro Critical Care     Interval Hx: No seizures on EEG in past 24hrs. Pt remains intubated but is following commands.      HPI:  71 yo F with PMH of CAD s/p CABG (November 2019), HFrEF (EF 20%), AS, ESRD (MWF), bladder prolapse requiring chronic Montejo, Diabetes Type 2 on insulin presents from home s/p mechanical fall. When the history was taken in the emergency department, she The denied any dizziness/ palpitations/ aura/vertigo. She reported non-radiating R shoulder pain and non-radiating R hip pain on arrival. She was found to have R humeral fracture / R hip fracture / non displaced odontoid fracture/ acute rib fractures/ ? T6 vertebral fracture.     In the emergency department, while the patient was receiving trauma work up she was found pulseless by a PCA in the room ( she was not on the monitor at that time) and had a cardiac arrest. ROSC was achieved after 2 minutes of CPR (1 epinephrine was given and 1 calcium chloride iv). Patient was intubated and sedated. EKG showed RBBB with wide complex tachycardia. Per family at the bedside, the patient had her last hemodialysis yesterday and has been relatively non compliant with her medications and medical care at home. Unable to obtain ros as patient is sedated.     T(C): 35 , HR: 60, BP: 158/67, RR: 20, SpO2: 99% vented  Labs: d-dimer ~ 4700, p-bnp 70,000, trop (0.20 <--0.17)  ABG pH, Arterial: 7.45, CO2: 36, Arterial O2: 63 , HCO3: 25 , Base Excess: 1.4 , SaO2: 94      CTH (-)            (01 Apr 2021 07:42)      Past Medical and Surgical Hx:  PAST MEDICAL & SURGICAL HISTORY:  Diabetes    Congestive heart failure (CHF)    Pleural effusion due to congestive heart failure    End stage renal disease  on dialysis Mon, Wed, Fr    Uterine prolapse    Chronic indwelling Montejo catheter    History of repair of hip fracture  2019    S/P CABG (coronary artery bypass graft)  11/2019    History of thoracentesis    Chronic indwelling Montejo catheter        Allergies: No Known Allergies      ROS:   Patient unable to participate in ROS due to neurologic status      PE:  Gen: Elderly female with C-collar in place, intubated and sedated  Mental status: Sedated. Eyes open spontaneously. Following commands.  Cranial nerves: PERRL, no nystagmus or dysconjugate gaze, face grossly symmetric  Motor:  Lifts head off bed. Moves fingers b/l, raises LUE antigravity. Movement of RUE limited by humerus fractures. Moving LEs spontaneously  Sensation: Intact to light touch      Vital Signs:  ICU Vital Signs Last 24 Hrs  T(C): 36.3 (05 Apr 2021 08:00), Max: 37.2 (04 Apr 2021 16:00)  T(F): 97.4 (05 Apr 2021 08:00), Max: 98.9 (04 Apr 2021 16:00)  HR: 50 (05 Apr 2021 13:00) (48 - 66)  BP: --  BP(mean): --  ABP: 114/46 (05 Apr 2021 13:00) (114/46 - 158/68)  ABP(mean): 66 (05 Apr 2021 13:00) (66 - 100)  RR: 23 (05 Apr 2021 13:00) (12 - 61)  SpO2: 100% (05 Apr 2021 13:00) (99% - 100%)      Ventilator:  Mode: AC/ CMV (Assist Control/ Continuous Mandatory Ventilation)  RR (machine): 12  TV (machine): 400  FiO2: 40  PEEP: 5  ITime: 1  MAP: 7  PIP: 27      Medications Current and PRN:  MEDICATIONS  (STANDING):  ALBUTerol    90 MICROgram(s) HFA Inhaler 1 Puff(s) Inhalation every 4 hours  albuterol/ipratropium for Nebulization 3 milliLiter(s) Nebulizer every 6 hours  aMIOdarone    Tablet 200 milliGRAM(s) Oral two times a day  aspirin  chewable 81 milliGRAM(s) Oral daily  atorvastatin Oral Tab/Cap - Peds 40 milliGRAM(s) Oral daily  BACItracin   Ointment 1 Application(s) Topical two times a day  cefTRIAXone   IVPB 1000 milliGRAM(s) IV Intermittent every 24 hours  chlorhexidine 0.12% Liquid 15 milliLiter(s) Oral Mucosa every 12 hours  chlorhexidine 4% Liquid 1 Application(s) Topical <User Schedule>  dexMEDEtomidine Infusion 0.2 MICROgram(s)/kG/Hr (3.3 mL/Hr) IV Continuous <Continuous>  dextrose 5% + sodium chloride 0.9%. 1000 milliLiter(s) (50 mL/Hr) IV Continuous <Continuous>  diVALproex Sprinkle 250 milliGRAM(s) Oral two times a day  epoetin mana-epbx (RETACRIT) Injectable 85754 Unit(s) IV Push <User Schedule>  fentaNYL   Infusion 0.5 MICROgram(s)/kG/Hr (2.9 mL/Hr) IV Continuous <Continuous>  heparin   Injectable 5000 Unit(s) SubCutaneous every 8 hours  levETIRAcetam  IVPB 375 milliGRAM(s) IV Intermittent every 12 hours  levETIRAcetam  IVPB 250 milliGRAM(s) IV Intermittent <User Schedule>  mupirocin 2% Ointment 1 Application(s) Topical two times a day  pantoprazole  Injectable 40 milliGRAM(s) IV Push daily  senna 2 Tablet(s) Oral daily    MEDICATIONS  (PRN):      I/Os:  I&O's Summary    04 Apr 2021 07:01  -  05 Apr 2021 07:00  --------------------------------------------------------  IN: 670 mL / OUT: 305 mL / NET: 365 mL    05 Apr 2021 07:01 - 05 Apr 2021 14:18  --------------------------------------------------------  IN: 252 mL / OUT: 50 mL / NET: 202 mL        Labs:  04-05    135  |  97<L>  |  33<H>  ----------------------------<  115<H>  3.9   |  22  |  3.8<H>    Ca    8.6      05 Apr 2021 04:30  Phos  5.7     04-05  Mg     2.0     04-05    TPro  5.5<L>  /  Alb  3.2<L>  /  TBili  0.3  /  DBili  x   /  AST  13  /  ALT  9   /  AlkPhos  139<H>  04-05                          8.2    4.51  )-----------( 102      ( 05 Apr 2021 04:30 )             26.6       LIVER FUNCTIONS - ( 05 Apr 2021 04:30 )  Alb: 3.2 g/dL / Pro: 5.5 g/dL / ALK PHOS: 139 U/L / ALT: 9 U/L / AST: 13 U/L / GGT: x           ABG - ( 05 Apr 2021 04:10 )  pH, Arterial: 7.41  pH, Blood: x     /  pCO2: 38    /  pO2: 184   / HCO3: 24    / Base Excess: -0.6  /  SaO2: 99          EEG:  EEG REPORT:   EEG Report:  · EEG Report	  Epilepsy Attending Note:     GSU WILBURN    70y Female  MRN [id number]    VEEG in the last 24 hours:    Background----------  continues, less than optimally organized, reactive and reaching frequencies in the range of 6-7 hz    Focal and generalized slowing-------------   moderate generalized slowing                                                                 bifrontal slowing  Interictal activity---------  large amount of diffusely expressed triphasic waves that some are clealy epileptiform in nature and are stimulus dependent. This activity occasionally appears in psuedo-periodic pattern    Events---------------  none    Seizures------------  none    Impression:  1- encephalopathy  2- potential for seizure that is not lateralizing    Plan -  discussed with the NCC team. plan as/ the NCC team      Assessment: 70-year-old female with PMHx CAD, S/P CABG (11/2019), HFrEF (EF 20%), AS, ESRD, HMD M/W/F, bladder prolapse, requiring chronic montejo, T2DM, on Insulin, BIBA 2/2 mechanical fall. Trauma workup revealed Rt humeral fracture, Rt hip fracture, non-displaced odontoid fracture, acute rib fractures, and possible T6 vertebral fracture. While in ED, s/p cardiac arrest of unknown downtime d/t unmonitored, however ROSC achieved 2 minutes after initiation of CPR. Intubated for airway protection, started on sedation and pressors. Post-cardiac arrest, noted seizure-like episode, with right gaze preference and clonic jerking of extremities while decreasing sedation for CPAP trials. Symptoms lasting approximately 5 minutes, that resolved with Versed 2mg IV. Another similar episode witnessed by family, lasting approximately one minutes, that resolved with no intervention. Initial CTH, 4/1/2021, negative for acute intracranial pathology, with subsequent imaging post seizure revealing no change from previous CTH. No seizures over past 24hrs on vEEG.      Plan:  - Discontinue vEEG  - Neuro checks q2hrs  - Seizure precautions  - Continue Keppra 375mg BID with additional 250mg dose after dialysis  - Continue Depakote 250 mg BID   - Continue ASA 81mg PO q24hrs  - Continue Atorvastatin 40mg PO q24hrs  - Minimize sedation; extubate when able as per primary team  - Patient cleared for OR for AICD/orthopedic surgery from neuro perspective; C-collar will need to remain in place, no manipulation of neck      KEHINDE Devine  Please feel free to contact for any questions or concerns. Thank you.  Extension: [phone]

## 2021-04-05 NOTE — CONSULT NOTE ADULT - ASSESSMENT
69 yo F with PMH of CAD s/p CABG (November 2019), HFrEF (EF 20%), AS, ESRD (MWF), bladder prolapse requiring chronic Castellon, Diabetes Type 2 on insulin presents from home s/p mechanical fall with the following injuries:   -Acute comminuted fracture of the right humeral neck.  -Acute comminuted right intertrochanteric fracture.  -Age-indeterminate fracture of T6 vertebral body, new since prior examination.  -Multiple bilateral acute and chronic rib fractures  Patient coded in the ED and is currently intubated and sedated. Surgery consulted for recommendations regarding perirectal abscess I&D site from 3/3.     Plan:   -Area appears clean and dry without evidence of purulent drainage   -Healthy granulation tissue at the base of the wound   -Daily packing changes  -Plan discussed with Dr. Mota

## 2021-04-05 NOTE — PROGRESS NOTE ADULT - SUBJECTIVE AND OBJECTIVE BOX
HPI  Patient is a 70y old Female who presents with a chief complaint of S/p mechanical fall (05 Apr 2021 09:44)    Currently admitted to medicine with the primary diagnosis of Cardiopulmonary arrest       Today is hospital day 4d.     INTERVAL HPI / OVERNIGHT EVENTS:  Yesterday morning, patient became hypotensive and started on levo drip. EKG showed RBBB and Afib. Trops began uptrending .21-->.34.   Patient was examined and seen at bedside. This morning she is sedated and intubated. follows commands.     ROS: Otherwise unremarkable     PAST MEDICAL & SURGICAL HISTORY  Diabetes    Congestive heart failure (CHF)    Pleural effusion due to congestive heart failure    End stage renal disease  on dialysis Mon, Wed, Fr    Uterine prolapse    Chronic indwelling Castellon catheter    History of repair of hip fracture  2019    S/P CABG (coronary artery bypass graft)  11/2019    History of thoracentesis    Chronic indwelling Castellon catheter      ALLERGIES  No Known Allergies    MEDICATIONS  STANDING MEDICATIONS  ALBUTerol    90 MICROgram(s) HFA Inhaler 1 Puff(s) Inhalation every 4 hours  albuterol/ipratropium for Nebulization 3 milliLiter(s) Nebulizer every 6 hours  aMIOdarone    Tablet 200 milliGRAM(s) Oral two times a day  aspirin  chewable 81 milliGRAM(s) Oral daily  atorvastatin Oral Tab/Cap - Peds 40 milliGRAM(s) Oral daily  BACItracin   Ointment 1 Application(s) Topical two times a day  cefTRIAXone   IVPB 1000 milliGRAM(s) IV Intermittent every 24 hours  chlorhexidine 0.12% Liquid 15 milliLiter(s) Oral Mucosa every 12 hours  chlorhexidine 4% Liquid 1 Application(s) Topical <User Schedule>  dexMEDEtomidine Infusion 0.2 MICROgram(s)/kG/Hr IV Continuous <Continuous>  dextrose 5% + sodium chloride 0.9%. 1000 milliLiter(s) IV Continuous <Continuous>  diVALproex Sprinkle 250 milliGRAM(s) Oral two times a day  epoetin mana-epbx (RETACRIT) Injectable 79264 Unit(s) IV Push <User Schedule>  heparin   Injectable 5000 Unit(s) SubCutaneous every 8 hours  levETIRAcetam  IVPB 375 milliGRAM(s) IV Intermittent every 12 hours  levETIRAcetam  IVPB 250 milliGRAM(s) IV Intermittent <User Schedule>  mupirocin 2% Ointment 1 Application(s) Topical two times a day  pantoprazole  Injectable 40 milliGRAM(s) IV Push daily  senna 2 Tablet(s) Oral daily    PRN MEDICATIONS    VITALS:  T(F): 97.4  HR: 50  BP: --  RR: 23  SpO2: 100%    PHYSICAL EXAM  GEN: NAD, Resting comfortably in bed  PULM: Clear to auscultation bilaterally, No wheezes  CVS: Regular rate and rhythm, S1-S2, no murmurs  ABD: Soft, non-tender, non-distended, no guarding  EXT: No edema  NEURO: A&Ox3, no focal deficits    LABS                        8.2    4.51  )-----------( 102      ( 05 Apr 2021 04:30 )             26.6     04-05    135  |  97<L>  |  33<H>  ----------------------------<  115<H>  3.9   |  22  |  3.8<H>    Ca    8.6      05 Apr 2021 04:30  Phos  5.7     04-05  Mg     2.0     04-05    TPro  5.5<L>  /  Alb  3.2<L>  /  TBili  0.3  /  DBili  x   /  AST  13  /  ALT  9   /  AlkPhos  139<H>  04-05        ABG - ( 05 Apr 2021 04:10 )  pH, Arterial: 7.41  pH, Blood: x     /  pCO2: 38    /  pO2: 184   / HCO3: 24    / Base Excess: -0.6  /  SaO2: 99                    CARDIAC MARKERS ( 04 Apr 2021 04:30 )  x     / 0.33 ng/mL / x     / x     / x      CARDIAC MARKERS ( 04 Apr 2021 02:01 )  x     / 0.34 ng/mL / x     / x     / x          RADIOLOGY      Assessment/Plan:    71 y/o F with a pmhx of CAD s/p CABG (Nov 2019), HFrEF (EF 20%), AS, ESRD (MWF), bladder prolapse requiring chronic Castellon, Diabetes Type 2 on insulin presents from home s/p mechanical fall. Trauma workup in ED significant for R humeral fracture / R hip fracture / non displaced odontoid fracture/ acute rib fractures/ ? T6 vertebral fracture. In the ED patient arrested, ROSC achieved after 2 minutes of CPR. EKG at the time showed RBBB with wide complex tachy. Pt subsequently intubated and sedated, started on pressors. Pt Experienced seizure-like episode during weaning trial s/p 2mg Versed.  (4/2/21) Weaning trial passed. Patient was Extubated . 02 sat began to decrease to low 80s, tachypenic and cyanotic started on bipap with no improvement. Patient again became hypotensive and tachy. Went into Afib. s/p cardioversion x3 and started on amio       #Cardiac arrest in the setting of arythmia and CHF exacerbation   - Trops .22-->.24 -->.21--> .34   - CTA negative for PE   - Lower extremity duplex negative for DVT   - EP recs : Plan for CRT-D placement tomorrow once patient is stable and before extubation due to C2 fracture. Will be scheduled for end of day due to Adenovirus infection. Keep NPO at midnight  - D/c levo   - Starting heparin subq       # Seizure unknown etiology  - CT Head (4/1) negative   - Keppra 500mg adminstered. followed by 375mg BID with additional 250mg dose after dialysis  - Started on Depakote 250mg PO BID   - VEEG - bifrontal sharps but no seizure activity. Consistent with metabolic process.   - D/c VEEG     # Aspiration Pneumonia vs Adenovirus Pneumonia   - CXR (4/5) Bilateral opacities, decreased. Stable cardiomegaly/left pleural effusion..  - ID Recs : Unasyn 3 gm iv q24h, D/c rocephin, Deep ET cultures  - Day 3 of Ceftriaxone     #Decubital Ulcer   - Burn consult ordered    # RT Humerus/Rt femur fracture   - Ortho following  - Recs :  Ortho will continue to monitor patient's overall status, with plan for surgical hip stabilization dependent on patient's status/improvement, and discussion with family/NOK and/or patient if able.   - Pain control. Restarting Fentanyl drip  - Apply arm sling to RUE for comfort    #Odontoid fracture   - Neurosurgery following patient   - f/u with neurosurgery for collar removal     #ESRD  - Nephro following patient  - Last dialyzed saturday. Dialyze again Tuesday (4/6)    # h/o DM   - fingersticks tid  - hold home insulin  - insulin protocol if > 180    # DVT PPX: heparin subq  # GI PPX: PPI  # Fluids : D5 LR @ 40   # Diet: NPO   # Activity: bedrest  # Bowel regimen        HPI  Patient is a 70y old Female who presents with a chief complaint of S/p mechanical fall (05 Apr 2021 09:44)    Currently admitted to medicine with the primary diagnosis of Cardiopulmonary arrest       Today is hospital day 4d.     INTERVAL HPI / OVERNIGHT EVENTS:  Yesterday morning, patient became hypotensive and started on levo drip. EKG showed RBBB and Afib. Trops began uptrending .21-->.34.   Patient was examined and seen at bedside. This morning she is sedated and intubated. follows commands.     ROS: Otherwise unremarkable     PAST MEDICAL & SURGICAL HISTORY  Diabetes    Congestive heart failure (CHF)    Pleural effusion due to congestive heart failure    End stage renal disease  on dialysis Mon, Wed, Fr    Uterine prolapse    Chronic indwelling Castellon catheter    History of repair of hip fracture  2019    S/P CABG (coronary artery bypass graft)  11/2019    History of thoracentesis    Chronic indwelling Castellon catheter      ALLERGIES  No Known Allergies    MEDICATIONS  STANDING MEDICATIONS  ALBUTerol    90 MICROgram(s) HFA Inhaler 1 Puff(s) Inhalation every 4 hours  albuterol/ipratropium for Nebulization 3 milliLiter(s) Nebulizer every 6 hours  aMIOdarone    Tablet 200 milliGRAM(s) Oral two times a day  aspirin  chewable 81 milliGRAM(s) Oral daily  atorvastatin Oral Tab/Cap - Peds 40 milliGRAM(s) Oral daily  BACItracin   Ointment 1 Application(s) Topical two times a day  cefTRIAXone   IVPB 1000 milliGRAM(s) IV Intermittent every 24 hours  chlorhexidine 0.12% Liquid 15 milliLiter(s) Oral Mucosa every 12 hours  chlorhexidine 4% Liquid 1 Application(s) Topical <User Schedule>  dexMEDEtomidine Infusion 0.2 MICROgram(s)/kG/Hr IV Continuous <Continuous>  dextrose 5% + sodium chloride 0.9%. 1000 milliLiter(s) IV Continuous <Continuous>  diVALproex Sprinkle 250 milliGRAM(s) Oral two times a day  epoetin mana-epbx (RETACRIT) Injectable 51851 Unit(s) IV Push <User Schedule>  heparin   Injectable 5000 Unit(s) SubCutaneous every 8 hours  levETIRAcetam  IVPB 375 milliGRAM(s) IV Intermittent every 12 hours  levETIRAcetam  IVPB 250 milliGRAM(s) IV Intermittent <User Schedule>  mupirocin 2% Ointment 1 Application(s) Topical two times a day  pantoprazole  Injectable 40 milliGRAM(s) IV Push daily  senna 2 Tablet(s) Oral daily    PRN MEDICATIONS    VITALS:  T(F): 97.4  HR: 50  BP: --  RR: 23  SpO2: 100%    PHYSICAL EXAM  GEN: NAD, Resting comfortably in bed  PULM: Clear to auscultation bilaterally, No wheezes  CVS: Regular rate and rhythm, S1-S2, no murmurs  ABD: Soft, non-tender, non-distended, no guarding  EXT: No edema  NEURO: A&Ox3, no focal deficits    LABS                        8.2    4.51  )-----------( 102      ( 05 Apr 2021 04:30 )             26.6     04-05    135  |  97<L>  |  33<H>  ----------------------------<  115<H>  3.9   |  22  |  3.8<H>    Ca    8.6      05 Apr 2021 04:30  Phos  5.7     04-05  Mg     2.0     04-05    TPro  5.5<L>  /  Alb  3.2<L>  /  TBili  0.3  /  DBili  x   /  AST  13  /  ALT  9   /  AlkPhos  139<H>  04-05        ABG - ( 05 Apr 2021 04:10 )  pH, Arterial: 7.41  pH, Blood: x     /  pCO2: 38    /  pO2: 184   / HCO3: 24    / Base Excess: -0.6  /  SaO2: 99                    CARDIAC MARKERS ( 04 Apr 2021 04:30 )  x     / 0.33 ng/mL / x     / x     / x      CARDIAC MARKERS ( 04 Apr 2021 02:01 )  x     / 0.34 ng/mL / x     / x     / x          RADIOLOGY      Assessment/Plan:    71 y/o F with a pmhx of CAD s/p CABG (Nov 2019), HFrEF (EF 20%), AS, ESRD (MWF), bladder prolapse requiring chronic Castellon, Diabetes Type 2 on insulin presents from home s/p mechanical fall. Trauma workup in ED significant for R humeral fracture / R hip fracture / non displaced odontoid fracture/ acute rib fractures/ ? T6 vertebral fracture. In the ED patient arrested, ROSC achieved after 2 minutes of CPR. EKG at the time showed RBBB with wide complex tachy. Pt subsequently intubated and sedated, started on pressors. Pt Experienced seizure-like episode during weaning trial s/p 2mg Versed.  (4/2/21) Weaning trial passed. Patient was Extubated . 02 sat began to decrease to low 80s, tachypenic and cyanotic started on bipap with no improvement. Patient again became hypotensive and tachy. Went into Afib. s/p cardioversion x3 and started on amio       #Cardiac arrest in the setting of arythmia and CHF exacerbation   - Trops .22-->.24 -->.21--> .34   - CTA negative for PE   - Lower extremity duplex negative for DVT   - EP recs : Plan for CRT-D placement tomorrow once patient is stable and before extubation due to C2 fracture. Will be scheduled for end of day due to Adenovirus infection. Keep NPO at midnight  - D/c levo   - Starting heparin subq   - F/u TSH results   - Repeat lower extremity doppler       # Seizure unknown etiology  - CT Head (4/1) negative   - Keppra 500mg adminstered. followed by 375mg BID with additional 250mg dose after dialysis  - Started on Depakote 250mg PO BID   - VEEG - bifrontal sharps but no seizure activity. Consistent with metabolic process.   - D/c VEEG     # Aspiration Pneumonia vs Adenovirus Pneumonia   - CXR (4/5) Bilateral opacities, decreased. Stable cardiomegaly/left pleural effusion..  - ID Recs : Unasyn 3 gm iv q24h, D/c rocephin, Deep ET cultures  - Day 3 of Ceftriaxone     #Decubital Ulcer   - Burn consult ordered    # RT Humerus/Rt femur fracture   - Ortho following  - Recs :  Ortho will continue to monitor patient's overall status, with plan for surgical hip stabilization dependent on patient's status/improvement, and discussion with family/NOK and/or patient if able.   - Pain control. Restarting Fentanyl drip  - Apply arm sling to RUE for comfort    #Odontoid fracture   - Neurosurgery following patient   - f/u with neurosurgery for collar removal     #ESRD  - Nephro following patient  - Last dialyzed saturday. Dialyze again Tuesday (4/6)    # h/o DM   - fingersticks tid  - hold home insulin  - insulin protocol if > 180    # DVT PPX: heparin subq  # GI PPX: PPI  # Fluids : D5 LR @ 40   # Diet: NPO   # Activity: bedrest  # Bowel regimen

## 2021-04-05 NOTE — EEG REPORT - NS EEG TEXT BOX
Epilepsy Attending Note:     GUS WILBURN    70y Female  MRN [id number]    Vital Signs Last 24 Hrs  T(C): 36.3 (05 Apr 2021 08:00), Max: 37.2 (04 Apr 2021 16:00)  T(F): 97.4 (05 Apr 2021 08:00), Max: 98.9 (04 Apr 2021 16:00)  HR: 54 (05 Apr 2021 10:00) (48 - 80)  BP: --  BP(mean): --  RR: 12 (05 Apr 2021 10:00) (12 - 80)  SpO2: 100% (05 Apr 2021 10:00) (99% - 100%)                          8.2    4.51  )-----------( 102      ( 05 Apr 2021 04:30 )             26.6       04-05    135  |  97<L>  |  33<H>  ----------------------------<  115<H>  3.9   |  22  |  3.8<H>    Ca    8.6      05 Apr 2021 04:30  Phos  5.7     04-05  Mg     2.0     04-05    TPro  5.5<L>  /  Alb  3.2<L>  /  TBili  0.3  /  DBili  x   /  AST  13  /  ALT  9   /  AlkPhos  139<H>  04-05      MEDICATIONS  (STANDING):  aMIOdarone    Tablet 200 milliGRAM(s) Oral two times a day  aspirin  chewable 81 milliGRAM(s) Oral daily  atorvastatin Oral Tab/Cap - Peds 40 milliGRAM(s) Oral daily  BACItracin   Ointment 1 Application(s) Topical two times a day  cefTRIAXone   IVPB 1000 milliGRAM(s) IV Intermittent every 24 hours  chlorhexidine 0.12% Liquid 15 milliLiter(s) Oral Mucosa every 12 hours  chlorhexidine 4% Liquid 1 Application(s) Topical <User Schedule>  dexMEDEtomidine Infusion 0.2 MICROgram(s)/kG/Hr (3.3 mL/Hr) IV Continuous <Continuous>  diVALproex Sprinkle 250 milliGRAM(s) Oral two times a day  epoetin mana-epbx (RETACRIT) Injectable 03985 Unit(s) IV Push <User Schedule>  levETIRAcetam  IVPB 375 milliGRAM(s) IV Intermittent every 12 hours  levETIRAcetam  IVPB 250 milliGRAM(s) IV Intermittent <User Schedule>  metolazone Oral Tab/Cap - Peds 5 milliGRAM(s) Oral daily  mupirocin 2% Ointment 1 Application(s) Topical two times a day  norepinephrine Infusion 0.05 MICROgram(s)/kG/Min (6.19 mL/Hr) IV Continuous <Continuous>  pantoprazole  Injectable 40 milliGRAM(s) IV Push daily  senna 2 Tablet(s) Oral daily  torsemide 80 milliGRAM(s) Oral two times a day    MEDICATIONS  (PRN):  fentaNYL    Injectable 25 MICROGram(s) IV Push every 4 hours PRN Moderate Pain (4 - 6)            VEEG in the last 24 hours:    Background----------  continues, less than optimally organized, reactive and reaching frequencies in the range of 6-7 hz    Focal and generalized slowing-------------   moderate generalized slowing                                                                 bifrontal slowing  Interictal activity---------  large amount of diffusely expressed triphasic waves that some are clealy epileptiform in nature and are stimulus dependent. This activity occasionally appears in psuedo-periodic pattern    Events---------------  none    Seizures------------  none    Impression:  1- encephalopathy  2- potential for seizure that is not lateralizing    Plan -  discussed with the NCC team. plan as/ the NCC team

## 2021-04-05 NOTE — PROGRESS NOTE ADULT - SUBJECTIVE AND OBJECTIVE BOX
GUS WILBURN  70y, Female    All available historical data reviewed    OVERNIGHT EVENTS:  no fevers  fio2 40%  sedated, does not follow commands    ROS:  unable to obtain history secondary to patient's mental status and/or sedation     VITALS:  T(F): 97.4, Max: 98.9 (04-04-21 @ 16:00)  HR: 54  BP: --  RR: 12Vital Signs Last 24 Hrs  T(C): 36.3 (05 Apr 2021 08:00), Max: 37.2 (04 Apr 2021 16:00)  T(F): 97.4 (05 Apr 2021 08:00), Max: 98.9 (04 Apr 2021 16:00)  HR: 54 (05 Apr 2021 10:00) (48 - 80)  BP: --  BP(mean): --  RR: 12 (05 Apr 2021 10:00) (12 - 136)  SpO2: 100% (05 Apr 2021 10:00) (99% - 100%)    TESTS & MEASUREMENTS:                        8.2    4.51  )-----------( 102      ( 05 Apr 2021 04:30 )             26.6     04-05    135  |  97<L>  |  33<H>  ----------------------------<  115<H>  3.9   |  22  |  3.8<H>    Ca    8.6      05 Apr 2021 04:30  Phos  5.7     04-05  Mg     2.0     04-05    TPro  5.5<L>  /  Alb  3.2<L>  /  TBili  0.3  /  DBili  x   /  AST  13  /  ALT  9   /  AlkPhos  139<H>  04-05    LIVER FUNCTIONS - ( 05 Apr 2021 04:30 )  Alb: 3.2 g/dL / Pro: 5.5 g/dL / ALK PHOS: 139 U/L / ALT: 9 U/L / AST: 13 U/L / GGT: x             Culture - Blood (collected 04-02-21 @ 11:52)  Source: .Blood None  Preliminary Report (04-03-21 @ 23:01):    No growth to date.    Culture - Urine (collected 04-02-21 @ 10:44)  Source: .Urine Catheterized  Final Report (04-04-21 @ 09:25):    >=3 organisms. Probable collection contamination.    Culture - Blood (collected 04-02-21 @ 05:40)  Source: .Blood None  Preliminary Report (04-03-21 @ 14:01):    No growth to date.    Culture - Blood (collected 04-01-21 @ 11:00)  Source: .Blood Blood-Peripheral  Preliminary Report (04-02-21 @ 22:01):    No growth to date.            RADIOLOGY & ADDITIONAL TESTS:  Personal review of radiological diagnostics performed  Echo and EKG results noted when applicable.     MEDICATIONS:  aMIOdarone    Tablet 200 milliGRAM(s) Oral two times a day  aspirin  chewable 81 milliGRAM(s) Oral daily  atorvastatin Oral Tab/Cap - Peds 40 milliGRAM(s) Oral daily  BACItracin   Ointment 1 Application(s) Topical two times a day  cefTRIAXone   IVPB 1000 milliGRAM(s) IV Intermittent every 24 hours  chlorhexidine 0.12% Liquid 15 milliLiter(s) Oral Mucosa every 12 hours  chlorhexidine 4% Liquid 1 Application(s) Topical <User Schedule>  dexMEDEtomidine Infusion 0.2 MICROgram(s)/kG/Hr IV Continuous <Continuous>  diVALproex Sprinkle 250 milliGRAM(s) Oral two times a day  epoetin mana-epbx (RETACRIT) Injectable 58760 Unit(s) IV Push <User Schedule>  fentaNYL    Injectable 25 MICROGram(s) IV Push every 4 hours PRN  levETIRAcetam  IVPB 375 milliGRAM(s) IV Intermittent every 12 hours  levETIRAcetam  IVPB 250 milliGRAM(s) IV Intermittent <User Schedule>  metolazone Oral Tab/Cap - Peds 5 milliGRAM(s) Oral daily  mupirocin 2% Ointment 1 Application(s) Topical two times a day  norepinephrine Infusion 0.05 MICROgram(s)/kG/Min IV Continuous <Continuous>  pantoprazole  Injectable 40 milliGRAM(s) IV Push daily  senna 2 Tablet(s) Oral daily  torsemide 80 milliGRAM(s) Oral two times a day      ANTIBIOTICS:  cefTRIAXone   IVPB 1000 milliGRAM(s) IV Intermittent every 24 hours

## 2021-04-05 NOTE — PROGRESS NOTE ADULT - ATTENDING COMMENTS
- Remains intubated  - Abnormal EEG  - in sinus    - CRT-D when cleared by Neuro. prior to extubation. last case as per ID- difficult to accommodate.   - Continue amiodarone  - No OAC due to prior hx of GI bleeding and recent trauma

## 2021-04-05 NOTE — PROGRESS NOTE ADULT - SUBJECTIVE AND OBJECTIVE BOX
INTERVAL HPI/OVERNIGHT EVENTS:  Remains intubated. SB on tele 54 BPM, no other tele events noted. On precedex.     MEDICATIONS  (STANDING):  aMIOdarone    Tablet 200 milliGRAM(s) Oral two times a day  aspirin  chewable 81 milliGRAM(s) Oral daily  atorvastatin Oral Tab/Cap - Peds 40 milliGRAM(s) Oral daily  BACItracin   Ointment 1 Application(s) Topical two times a day  cefTRIAXone   IVPB 1000 milliGRAM(s) IV Intermittent every 24 hours  chlorhexidine 0.12% Liquid 15 milliLiter(s) Oral Mucosa every 12 hours  chlorhexidine 4% Liquid 1 Application(s) Topical <User Schedule>  dexMEDEtomidine Infusion 0.2 MICROgram(s)/kG/Hr (3.3 mL/Hr) IV Continuous <Continuous>  diVALproex Sprinkle 250 milliGRAM(s) Oral two times a day  epoetin mana-epbx (RETACRIT) Injectable 44085 Unit(s) IV Push <User Schedule>  levETIRAcetam  IVPB 375 milliGRAM(s) IV Intermittent every 12 hours  levETIRAcetam  IVPB 250 milliGRAM(s) IV Intermittent <User Schedule>  metolazone Oral Tab/Cap - Peds 5 milliGRAM(s) Oral daily  mupirocin 2% Ointment 1 Application(s) Topical two times a day  norepinephrine Infusion 0.05 MICROgram(s)/kG/Min (6.19 mL/Hr) IV Continuous <Continuous>  pantoprazole  Injectable 40 milliGRAM(s) IV Push daily  senna 2 Tablet(s) Oral daily  torsemide 80 milliGRAM(s) Oral two times a day    MEDICATIONS  (PRN):  fentaNYL    Injectable 25 MICROGram(s) IV Push every 4 hours PRN Moderate Pain (4 - 6)      Allergies  No Known Allergies    REVIEW OF SYSTEMS    [ ] A ten-point review of systems was otherwise negative except as noted.  [x] Due to altered mental status/intubation, subjective information were not able to be obtained from the patient. History was obtained, to the extent possible, from review of the chart and collateral sources of information.      Vital Signs Last 24 Hrs  T(C): 36.3 (05 Apr 2021 08:00), Max: 37.2 (04 Apr 2021 16:00)  T(F): 97.4 (05 Apr 2021 08:00), Max: 98.9 (04 Apr 2021 16:00)  HR: 52 (05 Apr 2021 09:00) (48 - 112)  BP: --  BP(mean): --  RR: 17 (05 Apr 2021 09:00) (12 - 136)  SpO2: 100% (05 Apr 2021 09:00) (99% - 100%)    04-04-21 @ 07:01  -  04-05-21 @ 07:00  --------------------------------------------------------  IN: 670 mL / OUT: 305 mL / NET: 365 mL    04-05-21 @ 07:01  -  04-05-21 @ 09:46  --------------------------------------------------------  IN: 87 mL / OUT: 10 mL / NET: 77 mL    Physical Exam  GENERAL: In no apparent distress, well nourished, and hydrated.  HEART: Regular, bradycardia; + murmur  PULMONARY: Intubated, Clear to auscultation and perfusion.  No rales, wheezing, or rhonchi bilaterally.  ABDOMEN: Soft, Nontender, Nondistended; Bowel sounds present  EXTREMITIES:  2+ Peripheral Pulses, No clubbing, cyanosis, or edema  NEUROLOGICAL: Follows commands off sedation    LABS:                        8.2    4.51  )-----------( 102      ( 05 Apr 2021 04:30 )             26.6     04-05    135  |  97<L>  |  33<H>  ----------------------------<  115<H>  3.9   |  22  |  3.8<H>    Ca    8.6      05 Apr 2021 04:30  Phos  5.7     04-05  Mg     2.0     04-05    TPro  5.5<L>  /  Alb  3.2<L>  /  TBili  0.3  /  DBili  x   /  AST  13  /  ALT  9   /  AlkPhos  139<H>  04-05 04-04-21 @ 07:01  -  04-05-21 @ 07:00  --------------------------------------------------------  IN: 670 mL / OUT: 305 mL / NET: 365 mL    04-05-21 @ 07:01  -  04-05-21 @ 09:46  --------------------------------------------------------  IN: 87 mL / OUT: 10 mL / NET: 77 mL        04-04-21 @ 07:01  -  04-05-21 @ 07:00  --------------------------------------------------------  IN: 670 mL / OUT: 305 mL / NET: 365 mL    04-05-21 @ 07:01  -  04-05-21 @ 09:46  --------------------------------------------------------  IN: 87 mL / OUT: 10 mL / NET: 77 mL        RADIOLOGY & ADDITIONAL TESTS:  < from: TTE Echo Complete w/o Contrast w/ Doppler (04.04.21 @ 10:24) >  Summary:   1. LV Ejection Fraction by Renner's Method with a biplane EF of 38 %.   2. Moderately decreased global left ventricular systolic function.   3. Entire inferior wall appears hypokinetic.   4. Mild concentric left ventricular hypertrophy.   5. Severely reduced RV systolic function.   6. Severely enlarged right ventricle.   7. Severely enlarged left atrium.   8. Peak transaortic gradient equals 48.8 mmHg, mean transaortic gradient equals 24.7 mmHg, the calculated aortic valve area equals 0.75 cm² by the continuity equation consistent with severe aortic stenosis.   9. The mitral valve leaflets are tethered due to reduced systolic function and elevated LVDP.  10. Degenerative mitral valve.  11. Moderate mitral annular calcification.  12. Severe mitral regurgitation.  13. Moderate tricuspid regurgitation.  14. Estimated pulmonary artery systolic pressure is 49.7 mmHg assuming a right atrial pressure of 15 mmHg, which is consistent with mild pulmonary hypertension.    PHYSICIAN INTERPRETATION:  Left Ventricle: The left ventricular internal cavity size is normal. Left ventricular wall thickness is mildly increased. There is mild concentric left ventricular hypertrophy. Global LV systolic function was moderately decreased. Entire inferior wall appears hypokinetic.  Right Ventricle: The right ventricular size is severely enlarged. RV systolic function is severely reduced.  Left Atrium: Severely enlarged left atrium.  Right Atrium: Moderately enlarged right atrium.  Pericardium:There is no evidence of pericardial effusion.  Mitral Valve: The mitral valve is degenerative in appearance. The mitral valve leaflets are tethered which is due to reduced systolic function and elevated LVDP. There is moderate mitral annular calcification. Severe mitral regurgitation.  Tricuspid Valve: The tricuspid valve is normal in structure. Moderate tricuspid regurgitation is visualized. Estimated pulmonary artery systolic pressure is 49.7 mmHg assuming a right atrial pressure of 15 mmHg, which is consistent with mild pulmonary hypertension.  Aortic Valve: The aortic valve is trileaflet. Sclerotic aortic valve with decreased opening. Peak transaortic gradient equals 48.8 mmHg, mean transaortic gradient equals 24.7 mmHg, the calculated aortic valve area equals 0.75 cm² by the continuity equation consistent with severe aortic stenosis. No aortic regurgitation.  Pulmonic Valve: The pulmonic valve is normal. Mild pulmonic regurgitation.  Aorta: Aortic root measured at sinotubular junction is normal.    < end of copied text >    < from: 12 Lead ECG (04.04.21 @ 05:55) >  Ventricular Rate 85 BPM    Atrial Rate 267 BPM    QRS Duration 180 ms    Q-T Interval 448 ms    QTC Calculation(Bazett) 533 ms    R Axis -54 degrees    T Axis 113 degrees    Diagnosis Line Atrial fibrillation  Right bundle branch block  Left anterior fascicular block  *** Bifascicular block ***  Left ventricular hypertrophy with QRS widening and repolarization abnormality  Septal infarct , age undetermined  Abnormal ECG    < end of copied text >    < from: CT Head No Cont (04.01.21 @ 21:57) >  FINDINGS:    The ventricles and cerebral sulci are age-appropriate.    There is no evidence of acute intracranial hemorrhage, mass effect or midline shift.    Gray-white differentiation is maintained. There are patchy periventricular and subcortical white matter hypodensities that are nonspecific but typically attributed to chronic small vessel ischemic change.    There is no fracture to the calvarium. Mastoid air cells are well aerated bilaterally. Mucosal thickening involving the left sphenoid sinus, and to a lesser extent the right sphenoid sinus. Otherwise, the visualized portions of the sinuses are unremarkable.    (There is contrast within the venous structures from the earlier CT scan of the chest and abdomen that was performed with IV contrast.)    IMPRESSION:    No CT evidence of acute intracranial pathology.    < end of copied text >    < from: EEG w/ Video Each 12-26 Hours, Unmonitored (04.03.21 @ 08:50) >  Events:  None reported      Impression:  Abnormal due to presence of:  1. Generalized slowing  2. Bifrontal epileptiform sharp waves  3. Triphasic waves      Clinical Correlation  Findings consistent with diffuse electrocerebral dysfunction secondary to metabolic etiology. Findings consistent with potential for frontal seizure    Findings discussed with Neurology, Dr. Billings    < end of copied text >

## 2021-04-05 NOTE — PROGRESS NOTE ADULT - ASSESSMENT
1. Cardiac Arrest: secondary to VFIB in nature, continue supportive care, monitor for now. Monitor for now.    2. Acute Hypoxic Respiratory Failure: secondary to septic/ cardiogenic shock, PNA, continue on lung protective mechanical ventilation for now, will wean FIO2 for sats greater then 92%,  tx nebs, pulmonary toilet, continue supportive care monitor for now.    3. Mixed Cardiogenic/Septic Shock: secondary to PNA, will continue empiric abx as per ID, off levophed gtt will wean for maps greater then 65, continue monitor for now.    4. Acute Biventricular CHF EF 38% in setting of Severe Aortic stenosis and Mod-Severe MR: continue supportive care awaiting BiVICD by EP, monitor for now.    5. PNA: continue empiric abx as per ID, continue pulmonary toilet tx nebs, monitor for now, f/u Cx's.    6. VFIB: tx amio, most likley ischemic in nature. F/u Cardiology recs.    7. B/l Rib Fractures: from fall continue pain control.    8. Right Humeral Fracture: continue supportive care as per Ortho, monitor for now.    9. Right Intertrochanteric Fracture: continue supportive care as per Ortho, monitor for now. Will need eventual surgery.    10. Odontoid Fracture: continue nec collar as per Neurology f/u Recs.    11. Seizures: tx AED's as per Neurology monitor for now.    12. Anemia: secondary to hip fracture and AOC transfuse as needed for less then 7, continue supportive care, monitor for now.    13. Sacral Decubitus/ Abscess s/p I/D, f/u surgery recs.    14. CAD s/p CABG: tx ASA, continue supportive care, monitor for now.    15. Bladder Prolapse with Chronic Castellon: continue Castellon monitor for now.    16. DM: tx inuslin sliding scale, monitor BSG's, continue supportive care.    17. ESRD on HD: continue renal replacement therapy as per nephrology, monitor for now.    18. DVT/ GI px: tx PPI/ hep subcut.    19. NUT: tx IVF's resume TF's.    Critical Care Time not including procedures 39 mins

## 2021-04-05 NOTE — PROGRESS NOTE ADULT - ASSESSMENT
69 yo F with PMH of CAD s/p CABG (November 2019), HFrEF (EF 20%), AS, ESRD (MWF), bladder prolapse requiring chronic Castellon, Diabetes Type 2 on insulin presents from home s/p mechanical fall with Multiple traumas (R humeral fracture / R hip fracture / non displaced odontoid fracture/ acute rib fractures/ ? T6 vertebral fracture). Found to be pulseless in trauma bay (not on monitor) with ROSC after 2 min. Seizurelike activities when sedation held, confirmed on vEEG. Extubated 4/2, followed by arrhythmia AF vs VT was reintubated and cardioverted.     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, RBBB, LAFB)  Hx of GI bleed  Acute decompensated CHF, Ischemic CMP  Anemia acute on chronic  CAD s/p CABG in 2019, NUBIA clip   Echo with Severe AS, Moderate to severe MR   Hx ESRD on HD  Hx of GI bleed  COVID negative 4/3    Plan:  - WIll plan for CRT-D, preferably before pt is extubated to minimize neck movement ; tentatively today vs tomorrow  - Cont Amio to PO 200mg q12h   - Resume Metoprolol when BP tolerates  - Anemia, work up for any source of bleeding  - Will need AC if no active bleed  - Keep NPO   - ID recs appreciated, last case, maintain contact and droplet precautions   - Neurology clearance prior to OR

## 2021-04-05 NOTE — PROGRESS NOTE ADULT - ASSESSMENT
69 yo F with PMH of CAD s/p CABG (November 2019), HFrEF (EF 20%), AS, ESRD (MWF), bladder prolapse requiring chronic Castellon, Diabetes Type 2 on insulin presents from home s/p mechanical fall. When the history was taken in the emergency department, she The denied any dizziness/ palpitations/ aura/vertigo. She reported non-radiating R shoulder pain and non-radiating R hip pain on arrival. She was found to have R humeral fracture / R hip fracture / non displaced odontoid fracture/ acute rib fractures/ ? T6 vertebral fracture. In the emergency department, while the patient was receiving trauma work up she was found pulseless by a PCA in the room ( she was not on the monitor at that time) and had a cardiac arrest. ROSC was achieved after 2 minutes of CPR (1 epinephrine was given and 1 calcium chloride iv). Patient was intubated and sedated. EKG showed RBBB with wide complex tachycardia. Per family at the bedside, the patient had her last hemodialysis yesterday and has been relatively non compliant with her medications and medical care at home. Unable to obtain ros as patient is sedated.       IMPRESSION;  Adenovirus > no convincing evidence of a PNA caused by Adenovirus  Doubt bacterial PNA  CT with Bilateral groundglass and consolidative opacities, most pronounced at right base, with scattered areas of interlobular septal thickening. Left lower lobe atelectasis. Small bilateral pleural effusions.  S/p cardiac arrest with a high probability of aspiration with chemical pneumontisi  Pt with Adenovirus pneumonia & recent cardiopulmonary arrest. Vented. R/O gram negative pneumonia in pt with DM.  Pts generally need bacterial coverage  WBC 4.5  COVID-19 NG  Nares ORSA. No ORSA PNA  Blood & Urine cxs NGTD 4/2    RECOMMENDATIONS;  Unasyn 3 gm iv q24h  D/c rocephin   Deep ET cultures

## 2021-04-05 NOTE — PROGRESS NOTE ADULT - SUBJECTIVE AND OBJECTIVE BOX
Date of Admission: 4/1/21    Interval history: Patient remains intubated on MV but is hemodynamically stable. On Friday, she went into VT after extubation.         HISTORY OF PRESENT ILLNESS:     71 yo F with PMH of CAD s/p CABG (November 2019), HFrEF (EF 20%), AS, ESRD (MWF), bladder prolapse requiring chronic Castellon, Diabetes Type 2 on insulin presents from home s/p mechanical fall. When the history was taken in the emergency department, she The denied any dizziness/ palpitations/ aura/vertigo. She reported non-radiating R shoulder pain and non-radiating R hip pain on arrival. In the emergency department, while the patient was receiving trauma work up she was found pulseless by a PCA in the room, had a cardiac arrest. ROSC was achieved after 2 minutes of CPR (1 epinephrine was given and 1 calcium chloride iv). Patient was intubated and sedated. EKG showed RBBB with wide complex tachycardia.   Per chart the patient had her last hemodialysis yesterday and has been relatively non compliant with her medications and medical care at home.   Of note in a previous call to the heart failure team, patient reported falling asleep for few seconds while doing the dishes couple of times.      PAST MEDICAL & SURGICAL HISTORY:    Diabetes  Congestive heart failure (CHF)  Pleural effusion due to congestive heart failure  End stage renal disease on dialysis Mon, Wed, Fr  Uterine prolapse  Chronic indwelling Castellon catheter  History of repair of hip fracture 2019  S/P CABG (coronary artery bypass graft) 11/2019  History of thoracentesis  Chronic indwelling Castellon catheter      FAMILY HISTORY:    Mother: MI and stomach cancer    SOCIAL HISTORY:      Former smoker      Allergies    No Known Allergies    Intolerances      PHYSICAL EXAM:    General Appearance: well appearing, normal for age and gender. 	  Neck: normal JVP, no bruit.   Cardiovascular: regular rate and rhythm S1 S2, No JVD, No murmurs, No edema  Respiratory: Lungs clear to auscultation	  Psychiatry: sedated but opens eyes   Gastrointestinal:  Soft, Non-tender  Skin/Integumen: No rashes, No ecchymoses, No cyanosis	  Neurologic: Non-focal, opens eyes and understands when spoken to  Musculoskeletal/ extremities: Normal range of motion, No clubbing, cyanosis or edema  Vascular: Peripheral pulses palpable 2+ bilaterally  	    PREVIOUS DIAGNOSTIC TESTING:      TTE 5/17/21  Summary:   1. Left ventricular ejection fraction, by visual estimation, is 20 to 25%.   2. Severely decreased global left ventricular systolic function.   3. The mitral valve leaflets are tethered due to reduced systolic function and elevated LVDP.   4. Moderate mitral annular calcification.   5. Moderate-to-severe mitral regurgitation.   6. Peak transaortic gradient equals 25.2 mmHg, mean transaortic gradient equals 8.8 mmHg, the calculated aortic valve area equals 1.07 cm² by the continuity equation consistent with moderate aortic stenosis (possible LFLG).   7. Estimated JENNIFER = 1.24 cm2 by planimetry.   8. Trivial aortic regurgitation.   9. Moderate tricuspid regurgitation.  10. Estimated pulmonary artery systolic pressure is 44.5 mmHg assuming a right atrial pressure of 8 mmHg, which is consistent with mild pulmonary hypertension.  11. LA volume Index is 71.0 ml/m² ml/m2.  12. Bilateral pleural effusions.      Home Medications:  aspirin 81 mg oral tablet, chewable: 1 tab(s) orally once a day (01 Apr 2021 11:06)  atorvastatin 40 mg oral tablet: 1 tab(s) orally once a day (01 Apr 2021 11:06)  INSULIN LISPRO KWIKPEN  100 UNIT/ML SOPN:  (01 Apr 2021 11:06)  METOLAZONE  5 MG TABS: 1  orally once a day (01 Apr 2021 11:06)  METOPROLOL SUCCINATE ER  25 MG TB24: 1  orally 2 times a day (01 Apr 2021 11:06)  SERTRALINE HCL  50 MG TABS:  (01 Apr 2021 11:06)  SEVELAMER CARBONATE 800MG TABLETS: TK 2 TS PO TID WITH MEALS (01 Apr 2021 11:06)  TORSEMIDE 20MG TABLETS: TK 3 TS PO BID (01 Apr 2021 11:06)  VITAMIN D2 66771XB (ERGO) CAP RX: TK 1 C PO WEEKLY (01 Apr 2021 11:06)

## 2021-04-05 NOTE — PROGRESS NOTE ADULT - SUBJECTIVE AND OBJECTIVE BOX
Patient is a 70y old  Female who presents with a chief complaint of S/p mechanical fall (05 Apr 2021 09:44)        pt remains critically ill on MV        ROS:     All ROS are negative except HPI         PHYSICAL EXAM    ICU Vital Signs Last 24 Hrs  T(C): 36.3 (05 Apr 2021 15:00), Max: 37.1 (05 Apr 2021 00:00)  T(F): 97.4 (05 Apr 2021 15:00), Max: 98.8 (05 Apr 2021 00:00)  HR: 50 (05 Apr 2021 16:00) (48 - 66)  BP: --  BP(mean): --  ABP: 100/44 (05 Apr 2021 16:00) (100/44 - 158/68)  ABP(mean): 62 (05 Apr 2021 16:00) (62 - 100)  RR: 15 (05 Apr 2021 16:00) (12 - 58)  SpO2: 100% (05 Apr 2021 16:00) (99% - 100%)      CONSTITUTIONAL:  sedated on MV    ENT:   pos Et tube    EYES:   Pupils equal,   Round and reactive to light.    CARDIAC:   Normal rate,   Regular rhythm.    No edema      Vascular:  Normal systolic impulse  No Carotid bruits    RESPIRATORY:   rhonchi b/l lungs    GASTROINTESTINAL:  Abdomen soft,   Non-tender,   No guarding,   + BS    MUSCULOSKELETAL:   Range of motion is not limited,  No clubbing, cyanosis    NEUROLOGICAL:   Alert and oriented   No motor  deficits.    SKIN:   Skin normal color for race,   Warm and dry and intact.   No evidence of rash.    PSYCHIATRIC:   Normal mood and affect.   No apparent risk to self or others.    HEMATOLOGICAL:  No cervical  lymphadenopathy.  no inguinal lymphadenopathy      04-04-21 @ 07:01  -  04-05-21 @ 07:00  --------------------------------------------------------  IN:    Dexmedetomidine: 246 mL    Enteral Tube Flush: 150 mL    IV PiggyBack: 250 mL    Norepinephrine: 4 mL    Propofol: 20 mL  Total IN: 670 mL    OUT:    Indwelling Catheter - Urethral (mL): 305 mL  Total OUT: 305 mL    Total NET: 365 mL      04-05-21 @ 07:01  -  04-05-21 @ 16:26  --------------------------------------------------------  IN:    Dexmedetomidine: 76 mL    dextrose 5% + sodium chloride 0.9%: 250 mL    FentaNYL: 34.8 mL    IV PiggyBack: 50 mL  Total IN: 410.8 mL    OUT:    Indwelling Catheter - Urethral (mL): 90 mL    Norepinephrine: 0 mL  Total OUT: 90 mL    Total NET: 320.8 mL          LABS:                            8.2    4.51  )-----------( 102      ( 05 Apr 2021 04:30 )             26.6                                               04-05    135  |  97<L>  |  33<H>  ----------------------------<  115<H>  3.9   |  22  |  3.8<H>    Ca    8.6      05 Apr 2021 04:30  Phos  5.7     04-05  Mg     2.0     04-05    TPro  5.5<L>  /  Alb  3.2<L>  /  TBili  0.3  /  DBili  x   /  AST  13  /  ALT  9   /  AlkPhos  139<H>  04-05                                                 CARDIAC MARKERS ( 04 Apr 2021 04:30 )  x     / 0.33 ng/mL / x     / x     / x      CARDIAC MARKERS ( 04 Apr 2021 02:01 )  x     / 0.34 ng/mL / x     / x     / x                                                LIVER FUNCTIONS - ( 05 Apr 2021 04:30 )  Alb: 3.2 g/dL / Pro: 5.5 g/dL / ALK PHOS: 139 U/L / ALT: 9 U/L / AST: 13 U/L / GGT: x                                                                                               Mode: AC/ CMV (Assist Control/ Continuous Mandatory Ventilation)  RR (machine): 12  TV (machine): 350  FiO2: 40  PEEP: 5  ITime: 1  MAP: 7  PIP: 27                                      ABG - ( 05 Apr 2021 04:10 )  pH, Arterial: 7.41  pH, Blood: x     /  pCO2: 38    /  pO2: 184   / HCO3: 24    / Base Excess: -0.6  /  SaO2: 99                  MEDICATIONS  (STANDING):  ALBUTerol    90 MICROgram(s) HFA Inhaler 1 Puff(s) Inhalation every 4 hours  albuterol/ipratropium for Nebulization 3 milliLiter(s) Nebulizer every 6 hours  aMIOdarone    Tablet 200 milliGRAM(s) Oral two times a day  aspirin  chewable 81 milliGRAM(s) Oral daily  atorvastatin Oral Tab/Cap - Peds 40 milliGRAM(s) Oral daily  BACItracin   Ointment 1 Application(s) Topical two times a day  cefTRIAXone   IVPB 1000 milliGRAM(s) IV Intermittent every 24 hours  chlorhexidine 0.12% Liquid 15 milliLiter(s) Oral Mucosa every 12 hours  chlorhexidine 4% Liquid 1 Application(s) Topical <User Schedule>  dexMEDEtomidine Infusion 0.2 MICROgram(s)/kG/Hr (3.3 mL/Hr) IV Continuous <Continuous>  dextrose 5% + sodium chloride 0.9%. 1000 milliLiter(s) (50 mL/Hr) IV Continuous <Continuous>  diVALproex Sprinkle 250 milliGRAM(s) Oral two times a day  doxercalciferol Injectable 2 MICROGram(s) IV Push <User Schedule>  epoetin mana-epbx (RETACRIT) Injectable 70681 Unit(s) IV Push <User Schedule>  fentaNYL   Infusion 0.5 MICROgram(s)/kG/Hr (2.9 mL/Hr) IV Continuous <Continuous>  heparin   Injectable 5000 Unit(s) SubCutaneous every 8 hours  levETIRAcetam  IVPB 375 milliGRAM(s) IV Intermittent every 12 hours  levETIRAcetam  IVPB 250 milliGRAM(s) IV Intermittent <User Schedule>  mupirocin 2% Ointment 1 Application(s) Topical two times a day  pantoprazole  Injectable 40 milliGRAM(s) IV Push daily  senna 2 Tablet(s) Oral daily    MEDICATIONS  (PRN):      New X-rays reviewed:                                                                                  ECHO    CXR interpreted by me:

## 2021-04-05 NOTE — PROGRESS NOTE ADULT - SUBJECTIVE AND OBJECTIVE BOX
discussed case with primary team today   pt still not cleared for surgery due to bradycardia and hypotension   possibly clearance midweek   continue current care   will follow

## 2021-04-05 NOTE — PROGRESS NOTE ADULT - SUBJECTIVE AND OBJECTIVE BOX
[place] NEPHROLOGY FOLLOW UP NOTE  --------------------------------------------------------------------------------  24 hour events/subjective: Patient examined. Intubated.    PAST HISTORY  --------------------------------------------------------------------------------  No significant changes to PMH, PSH, FHx, SHx, unless otherwise noted    ALLERGIES & MEDICATIONS  --------------------------------------------------------------------------------  Allergies    No Known Allergies    Standing Inpatient Medications  ALBUTerol    90 MICROgram(s) HFA Inhaler 1 Puff(s) Inhalation every 4 hours  albuterol/ipratropium for Nebulization 3 milliLiter(s) Nebulizer every 6 hours  aMIOdarone    Tablet 200 milliGRAM(s) Oral two times a day  aspirin  chewable 81 milliGRAM(s) Oral daily  atorvastatin Oral Tab/Cap - Peds 40 milliGRAM(s) Oral daily  BACItracin   Ointment 1 Application(s) Topical two times a day  cefTRIAXone   IVPB 1000 milliGRAM(s) IV Intermittent every 24 hours  chlorhexidine 0.12% Liquid 15 milliLiter(s) Oral Mucosa every 12 hours  chlorhexidine 4% Liquid 1 Application(s) Topical <User Schedule>  dexMEDEtomidine Infusion 0.2 MICROgram(s)/kG/Hr IV Continuous <Continuous>  dextrose 5% + sodium chloride 0.9%. 1000 milliLiter(s) IV Continuous <Continuous>  diVALproex Sprinkle 250 milliGRAM(s) Oral two times a day  epoetin mana-epbx (RETACRIT) Injectable 65346 Unit(s) IV Push <User Schedule>  fentaNYL   Infusion 0.5 MICROgram(s)/kG/Hr IV Continuous <Continuous>  heparin   Injectable 5000 Unit(s) SubCutaneous every 8 hours  levETIRAcetam  IVPB 375 milliGRAM(s) IV Intermittent every 12 hours  levETIRAcetam  IVPB 250 milliGRAM(s) IV Intermittent <User Schedule>  mupirocin 2% Ointment 1 Application(s) Topical two times a day  pantoprazole  Injectable 40 milliGRAM(s) IV Push daily  senna 2 Tablet(s) Oral daily    VITALS/PHYSICAL EXAM  --------------------------------------------------------------------------------  T(C): 36.3 (04-05-21 @ 08:00), Max: 37.2 (04-04-21 @ 16:00)  HR: 50 (04-05-21 @ 13:00) (48 - 66)  BP: --  RR: 23 (04-05-21 @ 13:00) (12 - 61)  SpO2: 100% (04-05-21 @ 13:00) (99% - 100%)  Wt(kg): --        04-04-21 @ 07:01  -  04-05-21 @ 07:00  --------------------------------------------------------  IN: 670 mL / OUT: 305 mL / NET: 365 mL    04-05-21 @ 07:01  -  04-05-21 @ 14:33  --------------------------------------------------------  IN: 252 mL / OUT: 50 mL / NET: 202 mL      Physical Exam:  	Gen: Intubated  	Pulm:  B/L rales  	CV: RRR, S1S2  	Abd: +BS, soft, nontender/nondistended  	: Castellon  	LE: Warm, edema  	Vascular access: TDC    LABS/STUDIES  --------------------------------------------------------------------------------              8.2    4.51  >-----------<  102      [04-05-21 @ 04:30]              26.6     135  |  97  |  33  ----------------------------<  115      [04-05-21 @ 04:30]  3.9   |  22  |  3.8        Ca     8.6     [04-05-21 @ 04:30]      Mg     2.0     [04-05-21 @ 04:30]      Phos  5.7     [04-05-21 @ 04:30]    TPro  5.5  /  Alb  3.2  /  TBili  0.3  /  DBili  x   /  AST  13  /  ALT  9   /  AlkPhos  139  [04-05-21 @ 04:30]        Troponin 0.33      [04-04-21 @ 04:30]    Creatinine Trend:  SCr 3.8 [04-05 @ 04:30]  SCr 3.4 [04-04 @ 04:30]  SCr 4.9 [04-03 @ 04:50]  SCr 4.2 [04-02 @ 04:50]  SCr 3.8 [04-01 @ 18:27]    Urinalysis - [04-02-21 @ 10:44]      Color Yellow / Appearance Turbid / SG 1.027 / pH 7.0      Gluc Negative / Ketone Negative  / Bili Negative / Urobili <2 mg/dL       Blood Moderate / Protein 100 mg/dL / Leuk Est Large / Nitrite Negative      RBC 14 / WBC >720 / Hyaline 16 / Gran  / Sq Epi  / Non Sq Epi 3 / Bacteria Moderate      Iron 55, TIBC 167, %sat 33      [05-27-20 @ 13:22]  Ferritin 636      [05-27-20 @ 13:22]  HbA1c 5.7      [11-09-19 @ 01:20]

## 2021-04-05 NOTE — PROGRESS NOTE ADULT - ASSESSMENT
1. Odontoid fracture. Neurosurgery following.  2. R hip / R humerus fracture. Ortho following.  3. ESRD on HD TTS. HD tomorrow: 3 hours, opti 160 dialyzer, 2K bath, 1-2L UF.  4. Anemia. EPO with HD.  5. Secondary hyperparathyroidism. Hectorol with HD.

## 2021-04-06 NOTE — PROGRESS NOTE ADULT - SUBJECTIVE AND OBJECTIVE BOX
Date of Admission: 4/1/21    Interval history: Patient remains intubated on MV; started on Levophed this morning for hypotension.      HISTORY OF PRESENT ILLNESS:     69 yo F with PMH of CAD s/p CABG (November 2019), HFrEF (EF 20%), AS, ESRD (MWF), bladder prolapse requiring chronic Castellon, Diabetes Type 2 on insulin presents from home s/p mechanical fall. When the history was taken in the emergency department, she The denied any dizziness/ palpitations/ aura/vertigo. She reported non-radiating R shoulder pain and non-radiating R hip pain on arrival. In the emergency department, while the patient was receiving trauma work up she was found pulseless by a PCA in the room, had a cardiac arrest. ROSC was achieved after 2 minutes of CPR (1 epinephrine was given and 1 calcium chloride iv). Patient was intubated and sedated. EKG showed RBBB with wide complex tachycardia.   Per chart the patient had her last hemodialysis yesterday and has been relatively non compliant with her medications and medical care at home.   Of note in a previous call to the heart failure team, patient reported falling asleep for few seconds while doing the dishes couple of times.      PAST MEDICAL & SURGICAL HISTORY:    Diabetes  Congestive heart failure (CHF)  Pleural effusion due to congestive heart failure  End stage renal disease on dialysis Mon, Wed, Fr  Uterine prolapse  Chronic indwelling Castellon catheter  History of repair of hip fracture 2019  S/P CABG (coronary artery bypass graft) 11/2019  History of thoracentesis  Chronic indwelling Castellon catheter      FAMILY HISTORY:    Mother: MI and stomach cancer    SOCIAL HISTORY:      Former smoker      Allergies    No Known Allergies    Intolerances      PHYSICAL EXAM:    General Appearance: well appearing, normal for age and gender. 	  Neck: normal JVP, no bruit.   Cardiovascular: regular rate and rhythm S1 S2, No JVD, No murmurs, No edema  Respiratory: Lungs clear to auscultation	  Psychiatry: sedated but opens eyes   Gastrointestinal:  Soft, Non-tender  Skin/Integumen: No rashes, No ecchymoses, No cyanosis	  Neurologic: Non-focal, opens eyes and understands when spoken to  Musculoskeletal/ extremities: Normal range of motion, No clubbing, cyanosis or edema  Vascular: Peripheral pulses palpable 2+ bilaterally  	    PREVIOUS DIAGNOSTIC TESTING:      TTE 5/17/21  Summary:   1. Left ventricular ejection fraction, by visual estimation, is 20 to 25%.   2. Severely decreased global left ventricular systolic function.   3. The mitral valve leaflets are tethered due to reduced systolic function and elevated LVDP.   4. Moderate mitral annular calcification.   5. Moderate-to-severe mitral regurgitation.   6. Peak transaortic gradient equals 25.2 mmHg, mean transaortic gradient equals 8.8 mmHg, the calculated aortic valve area equals 1.07 cm² by the continuity equation consistent with moderate aortic stenosis (possible LFLG).   7. Estimated JENNIFER = 1.24 cm2 by planimetry.   8. Trivial aortic regurgitation.   9. Moderate tricuspid regurgitation.  10. Estimated pulmonary artery systolic pressure is 44.5 mmHg assuming a right atrial pressure of 8 mmHg, which is consistent with mild pulmonary hypertension.  11. LA volume Index is 71.0 ml/m² ml/m2.  12. Bilateral pleural effusions.      Home Medications:  aspirin 81 mg oral tablet, chewable: 1 tab(s) orally once a day (01 Apr 2021 11:06)  atorvastatin 40 mg oral tablet: 1 tab(s) orally once a day (01 Apr 2021 11:06)  INSULIN LISPRO KWIKPEN  100 UNIT/ML SOPN:  (01 Apr 2021 11:06)  METOLAZONE  5 MG TABS: 1  orally once a day (01 Apr 2021 11:06)  METOPROLOL SUCCINATE ER  25 MG TB24: 1  orally 2 times a day (01 Apr 2021 11:06)  SERTRALINE HCL  50 MG TABS:  (01 Apr 2021 11:06)  SEVELAMER CARBONATE 800MG TABLETS: TK 2 TS PO TID WITH MEALS (01 Apr 2021 11:06)  TORSEMIDE 20MG TABLETS: TK 3 TS PO BID (01 Apr 2021 11:06)  VITAMIN D2 48236CV (ERGO) CAP RX: TK 1 C PO WEEKLY (01 Apr 2021 11:06)

## 2021-04-06 NOTE — PROGRESS NOTE ADULT - ASSESSMENT
69 yo F with PMH of CAD s/p CABG (November 2019), HFrEF (EF 20%), AS, ESRD (MWF), bladder prolapse requiring chronic Castellon, Diabetes Type 2 on insulin.    Acute on chronic systolic HF/ ESRD on HD/ Respiratory distress / possible ventricular arrhythmia     Continue MV - management per PCCM team   Plan for CRT-D when lab available    Continue fluid removal via HD - nephro recs appreciated   Continue to hold HF meds  Ortho follow up   Discussed with EP and family over the phone   Will continue to follow

## 2021-04-06 NOTE — PROGRESS NOTE ADULT - ASSESSMENT
1. Cardiac Arrest: secondary to VFIB in nature, continue supportive care, monitor for now. Monitor for now.    2. Acute Hypoxic Respiratory Failure: secondary to septic/ cardiogenic shock, PNA, continue on lung protective mechanical ventilation for now, will wean FIO2 for sats greater then 92%,  tx nebs, pulmonary toilet, continue supportive care monitor for now. Check SAT and SBT.    3. Mixed Cardiogenic/Septic Shock: secondary to PNA, will continue empiric abx as per ID, on levophed gtt will wean for maps greater then 65, continue monitor for now.    4. Acute Biventricular CHF EF 38% in setting of Severe Aortic stenosis and Mod-Severe MR: continue supportive care awaiting BiVICD by EP, monitor for now.    5. PNA: continue empiric abx as per ID, continue pulmonary toilet tx nebs, monitor for now, f/u Cx's.    6. VFIB: tx amio, most likley ischemic in nature. F/u Cardiology recs.    7. B/l Rib Fractures: from fall continue pain control.    8. Right Humeral Fracture: continue supportive care as per Ortho, monitor for now.    9. Right Intertrochanteric Fracture: continue supportive care as per Ortho, monitor for now. Will need eventual surgery.    10. Odontoid Fracture: continue nec collar as per Neurology f/u Recs.    11. Seizures: tx AED's as per Neurology monitor for now.    12. Anemia: secondary to hip fracture and AOC transfuse as needed for less then 7, continue supportive care, monitor for now.    13. Sacral Decubitus/ Abscess s/p I/D, f/u surgery recs.    14. CAD s/p CABG: tx ASA, continue supportive care, monitor for now.    15. Bladder Prolapse with Chronic Castellon: continue Castellon monitor for now.    16. DM: tx inuslin sliding scale, monitor BSG's, continue supportive care.    17. ESRD on HD: continue renal replacement therapy as per nephrology, monitor for now.    18. DVT/ GI px: tx PPI/ hep subcut.    19. NUT: tx IVF's resume TF's.    Critical Care Time not including procedures 33 mins

## 2021-04-06 NOTE — PROGRESS NOTE ADULT - ATTENDING COMMENTS
case discussed with staff at [facility]  pt going for AICD placement  still remains vent dependent

## 2021-04-06 NOTE — CHART NOTE - NSCHARTNOTEFT_GEN_A_CORE
Registered Dietitian Follow-Up     Patient Profile Reviewed                           Yes [x]   No []     Nutrition History Previously Obtained        Yes [x]  No []       Pertinent Subjective Information: Pt. remains intubated, sedated. NPO for possible AICD placement today. Ve: 3.7, Tmax 37.1, .      Pertinent Medical Interventions:  Cardiac Arrest: secondary to VFIB in nature, continue supportive care. Acute Hypoxic Respiratory Failure: secondary to septic/ cardiogenic shock: intubated. ESRD on HD: continue renal replacement therapy as per nephrology.      Diet order: NPO     Anthropometrics:  - Ht. 157.5cm  - Wt. 64.3kg on 4/6 vs. 58kg pt. with edema will continue to monitor   - %wt change  - BMI 23.4  - IBW 110lbs      Pertinent Lab Data: (4/6) POCT glu 113 (4/5) WBC 4.51, RBC 2.82, Hg 8.2, Hct 26.8, BUN 33, creat 3.8, glu 115, alk phos 139, eGFR 12, Phos 5.7     Pertinent Meds: Heparin, Keppra, Atorvastatin, Protonix, Senna      Physical Findings:  - Appearance: intubated, 1+ L, R ankle edema   - GI function: abd noted soft/nontender per flow sheets, last BM 4/4  - Tubes: OGT  - Oral/Mouth cavity: NPO  - Skin: ecchymosis      Nutrition Requirements (from RD note on 4/2)   Weight Used: 58kg      Estimated Energy Needs    Continue []  Adjust [x] 1119kcal (PSE 2003b)   Adjusted Energy Recommendations:   kcal/day        Estimated Protein Needs    Continue [x]  Adjust [] 81-93 g (1.4-1.6 g/kg) same as above + ESRD on HD considered;  Adjusted Protein Recommendations:   gm/day        Estimated Fluid Needs        Continue [x]  Adjust [] 1000 mL + UOP or per CCU team  Adjusted Fluid Recommendations:   mL/day     Nutrient Intake: NPO        [] Previous Nutrition Diagnosis:  Inadequate protein/energy intake.            [x] Ongoing          [] Resolved    Nutrition Intervention: enteral and parenteral nutrition    Rec: When medically feasible to resume EN provide Peptamen AF 40ml/h with No Carb Prosource TF daily for 1192kcal, 87g protein, 777ml free H2O (106% est calorie needs, 100% est protein needs). Additional free H2O per LIP. Feed only if MAP consistently >65.      Goal/Expected Outcome: In 3 days TF to provide >85% est energy needs, but not exceed 105%     Indicator/Monitoring: energy intake, body composition, NFPF, electrolyte/renal profile, glucose profile Registered Dietitian Follow-Up     Patient Profile Reviewed                           Yes [x]   No []     Nutrition History Previously Obtained        Yes [x]  No []       Pertinent Subjective Information: Pt. remains intubated, sedated. NPO for possible AICD placement today. Plans for HD session today. Ve: 3.7, Tmax 37.1, .      Pertinent Medical Interventions:  Cardiac Arrest: secondary to VFIB in nature, continue supportive care. Acute Hypoxic Respiratory Failure: secondary to septic/ cardiogenic shock: intubated. ESRD on HD: continue renal replacement therapy as per nephrology.      Diet order: NPO     Anthropometrics:  - Ht. 157.5cm  - Wt. 64.3kg on 4/6 vs. 58kg pt. with edema will continue to monitor   - %wt change  - BMI 23.4  - IBW 110lbs      Pertinent Lab Data: (4/6) POCT glu 113 (4/5) WBC 4.51, RBC 2.82, Hg 8.2, Hct 26.8, BUN 33, creat 3.8, glu 115, alk phos 139, eGFR 12, Phos 5.7     Pertinent Meds: Heparin, Keppra, Atorvastatin, Protonix, Senna      Physical Findings:  - Appearance: intubated, 1+ L, R ankle edema   - GI function: abd noted soft/nontender per flow sheets, last BM 4/4  - Tubes: OGT  - Oral/Mouth cavity: NPO  - Skin: ecchymosis      Nutrition Requirements (from RD note on 4/2)   Weight Used: 58kg      Estimated Energy Needs    Continue []  Adjust [x] 1119kcal (PSE 2003b)   Adjusted Energy Recommendations:   kcal/day        Estimated Protein Needs    Continue [x]  Adjust [] 81-93 g (1.4-1.6 g/kg) same as above + ESRD on HD considered;  Adjusted Protein Recommendations:   gm/day        Estimated Fluid Needs        Continue [x]  Adjust [] 1000 mL + UOP or per CCU team  Adjusted Fluid Recommendations:   mL/day     Nutrient Intake: NPO        [] Previous Nutrition Diagnosis:  Inadequate protein/energy intake.            [x] Ongoing          [] Resolved    Nutrition Intervention: enteral and parenteral nutrition    Rec: When medically feasible to resume EN provide Peptamen AF 40ml/h with No Carb Prosource TF daily for 1192kcal, 87g protein, 777ml free H2O (106% est calorie needs, 100% est protein needs). Additional free H2O per LIP. Feed only if MAP consistently >65.      Goal/Expected Outcome: In 3 days TF to provide >85% est energy needs, but not exceed 105%     Indicator/Monitoring: energy intake, body composition, NFPF, electrolyte/renal profile, glucose profile

## 2021-04-06 NOTE — PROGRESS NOTE ADULT - ASSESSMENT
Assessment:  70y Female patient w/ extensive pmh and multiple ongoing medical issues with poor prognosis, intubated and sedated in the CCU.  surgery consulted for perirectal abscess.  Patient seen and examined at bedside.  Tolerating:  Diet, NPO after Midnight:      NPO Start Date: 05-Apr-2021,   NPO Start Time: 23:59 (04-05-21 @ 17:25)  Diet, NPO:   Except Medications (04-01-21 @ 19:14)    Plan:  - Monitor vitals  - Monitor labs and replete as necessary  - Monitor for bowel function  - Continue Pain Medications if necessary  - Continue Antibiotics if necessary  - abscess over one month ago, no need for further packing  -Area appears clean and dry without evidence of purulent drainage   -Healthy granulation tissue at the base of the wound   - recall prn    Date/Time: 04-06-21 @ 10:01

## 2021-04-06 NOTE — PROGRESS NOTE ADULT - SUBJECTIVE AND OBJECTIVE BOX
Patient is a 70y old  Female who presents with a chief complaint of S/p mechanical fall (05 Apr 2021 09:44)        Over Night Events:        ROS:     All ROS are negative except HPI         PHYSICAL EXAM    ICU Vital Signs Last 24 Hrs  T(C): 37.1 (06 Apr 2021 14:00), Max: 37.1 (06 Apr 2021 00:00)  T(F): 98.8 (06 Apr 2021 14:00), Max: 98.8 (06 Apr 2021 00:00)  HR: 78 (06 Apr 2021 15:00) (48 - 100)  BP: --  BP(mean): --  ABP: 112/52 (06 Apr 2021 15:00) (98/42 - 136/743)  ABP(mean): 70 (06 Apr 2021 15:00) (58 - 100)  RR: 17 (06 Apr 2021 15:00) (12 - 38)  SpO2: 97% (06 Apr 2021 15:00) (97% - 100%)      CONSTITUTIONAL:  Pt remains in ICU on MV    ENT:   pos Et tube    EYES:   Pupils equal,   Round and reactive to light.    CARDIAC:   Normal rate,   Regular rhythm.    No edema      Vascular:  Normal systolic impulse  No Carotid bruits    RESPIRATORY:   No wheezing  Bilateral BS  Normal chest expansion  Not tachypneic,  No use of accessory muscles    GASTROINTESTINAL:  Abdomen soft,   Non-tender,   No guarding,   + BS    MUSCULOSKELETAL:   Range of motion is not limited,  No clubbing, cyanosis    NEUROLOGICAL:   Alert and oriented   No motor  deficits.    SKIN:   Skin normal color for race,   Warm and dry and intact.   No evidence of rash.    PSYCHIATRIC:   Normal mood and affect.   No apparent risk to self or others.    HEMATOLOGICAL:  No cervical  lymphadenopathy.  no inguinal lymphadenopathy      04-05-21 @ 07:01  -  04-06-21 @ 07:00  --------------------------------------------------------  IN:    Dexmedetomidine: 213 mL    dextrose 5% + sodium chloride 0.9%: 1000 mL    Enteral Tube Flush: 150 mL    FentaNYL: 92.5 mL    IV PiggyBack: 250 mL  Total IN: 1705.5 mL    OUT:    Indwelling Catheter - Urethral (mL): 225 mL    Norepinephrine: 0 mL  Total OUT: 225 mL    Total NET: 1480.5 mL      04-06-21 @ 07:01  -  04-06-21 @ 15:52  --------------------------------------------------------  IN:    Dexmedetomidine: 93.5 mL    dextrose 5% + sodium chloride 0.9%: 400 mL    Enteral Tube Flush: 50 mL    FentaNYL: 19.6 mL    FentaNYL: 17.4 mL    Norepinephrine: 20 mL    Norepinephrine: 7.5 mL  Total IN: 608 mL    OUT:    Indwelling Catheter - Urethral (mL): 55 mL    Other (mL): 2000 mL  Total OUT: 2055 mL    Total NET: -1447 mL          LABS:                            8.7    5.78  )-----------( 136      ( 06 Apr 2021 08:19 )             27.8                                               04-06    132<L>  |  95<L>  |  40<H>  ----------------------------<  115<H>  4.1   |  23  |  4.6<HH>    Ca    8.8      06 Apr 2021 08:19  Phos  5.7     04-05  Mg     2.0     04-06    TPro  6.0  /  Alb  3.2<L>  /  TBili  0.3  /  DBili  x   /  AST  12  /  ALT  7   /  AlkPhos  135<H>  04-06      PT/INR - ( 06 Apr 2021 08:19 )   PT: 12.80 sec;   INR: 1.11 ratio         PTT - ( 06 Apr 2021 08:19 )  PTT:31.7 sec                                                                                     LIVER FUNCTIONS - ( 06 Apr 2021 08:19 )  Alb: 3.2 g/dL / Pro: 6.0 g/dL / ALK PHOS: 135 U/L / ALT: 7 U/L / AST: 12 U/L / GGT: x                                                                                               Mode: AC/ CMV (Assist Control/ Continuous Mandatory Ventilation)  RR (machine): 12  TV (machine): 350  FiO2: 40  PEEP: 5  ITime: 1  MAP: 12  PIP: 25                                      ABG - ( 06 Apr 2021 13:43 )  pH, Arterial: 7.42  pH, Blood: x     /  pCO2: 47    /  pO2: 153   / HCO3: 30    / Base Excess: 4.9   /  SaO2: 99                  MEDICATIONS  (STANDING):  albuterol/ipratropium (CFC free) Inhaler. 1 Puff(s) Inhalation four times a day  aMIOdarone    Tablet 200 milliGRAM(s) Oral two times a day  aspirin  chewable 81 milliGRAM(s) Oral daily  atorvastatin Oral Tab/Cap - Peds 40 milliGRAM(s) Oral daily  BACItracin   Ointment 1 Application(s) Topical two times a day  cefTRIAXone   IVPB 1000 milliGRAM(s) IV Intermittent every 24 hours  chlorhexidine 0.12% Liquid 15 milliLiter(s) Oral Mucosa every 12 hours  chlorhexidine 4% Liquid 1 Application(s) Topical <User Schedule>  dexMEDEtomidine Infusion 0.2 MICROgram(s)/kG/Hr (3.3 mL/Hr) IV Continuous <Continuous>  dextrose 5% + sodium chloride 0.9%. 1000 milliLiter(s) (50 mL/Hr) IV Continuous <Continuous>  diVALproex Sprinkle 250 milliGRAM(s) Oral two times a day  doxercalciferol Injectable 2 MICROGram(s) IV Push <User Schedule>  epoetin mana-epbx (RETACRIT) Injectable 01884 Unit(s) IV Push <User Schedule>  fentaNYL   Infusion. 0.5 MICROgram(s)/kG/Hr (2.9 mL/Hr) IV Continuous <Continuous>  heparin   Injectable 5000 Unit(s) SubCutaneous every 8 hours  levETIRAcetam  IVPB 375 milliGRAM(s) IV Intermittent every 12 hours  levETIRAcetam  IVPB 250 milliGRAM(s) IV Intermittent <User Schedule>  mupirocin 2% Ointment 1 Application(s) Topical two times a day  norepinephrine Infusion 0.05 MICROgram(s)/kG/Min (5.44 mL/Hr) IV Continuous <Continuous>  pantoprazole  Injectable 40 milliGRAM(s) IV Push daily  senna 2 Tablet(s) Oral daily    MEDICATIONS  (PRN):      New X-rays reviewed:                                                                                  ECHO    CXR interpreted by me:

## 2021-04-06 NOTE — PROGRESS NOTE ADULT - SUBJECTIVE AND OBJECTIVE BOX
Progress Note: General Surgery  Patient: GUS WILBURN , 70y (1950)Female   MRN: [id number]  Location: [address]  Visit: 04-01-21 Inpatient  Date: 04-06-21 @ 10:01    Admit Diagnosis/Chief Complaint:     Procedure/Diagnosis:  S/P     Events/ 24h: Patient seen and examined at bedside. No acute events overnight. Pain controlled. Afebrile, VSS.    Vitals: T(F): 97.4 (04-06-21 @ 08:00), Max: 98.8 (04-06-21 @ 00:00)  HR: 96 (04-06-21 @ 09:00)  BP: --  RR: 16 (04-06-21 @ 09:00)  SpO2: 100% (04-06-21 @ 09:00)  RR (machine): 12, TV (machine): 350, FiO2: 40, PEEP: 5, PIP: 25  In:   04-05-21 @ 07:01  -  04-06-21 @ 07:00  --------------------------------------------------------  IN: 1705.5 mL    04-06-21 @ 07:01  -  04-06-21 @ 10:01  --------------------------------------------------------  IN: 178 mL      Out:   04-05-21 @ 07:01  -  04-06-21 @ 07:00  --------------------------------------------------------  OUT:    Indwelling Catheter - Urethral (mL): 225 mL    Norepinephrine: 0 mL  Total OUT: 225 mL      04-06-21 @ 07:01  -  04-06-21 @ 10:01  --------------------------------------------------------  OUT:  Total OUT: 0 mL        Net:   04-05-21 @ 07:01  -  04-06-21 @ 07:00  --------------------------------------------------------  NET: 1480.5 mL    04-06-21 @ 07:01  -  04-06-21 @ 10:01  --------------------------------------------------------  NET: 178 mL        Diet: Diet, NPO after Midnight:      NPO Start Date: 05-Apr-2021,   NPO Start Time: 23:59 (04-05-21 @ 17:25)  Diet, NPO:   Except Medications (04-01-21 @ 19:14)    IV Fluids: dextrose 5% + sodium chloride 0.9%. 1000 milliLiter(s) (50 mL/Hr) IV Continuous <Continuous>  doxercalciferol Injectable 2 MICROGram(s) IV Push <User Schedule>    PHYSICAL EXAM:  GENERAL: Intubated, agitated   CHEST/LUNG: Symmetrical chest rise, ventilated   HEART: Regular rate and rhythm  ABDOMEN: Soft, nondistended, unable to assess tenderness   EXTREMITIES:  No clubbing, cyanosis, or edema  RECTAL: ~3 cm incision, ~1 cm deep, well-healing granulation tissue at the base of the wound, no evidence of purulent drainage     Medications: [Standing]  ALBUTerol    90 MICROgram(s) HFA Inhaler 1 Puff(s) Inhalation every 4 hours  albuterol/ipratropium for Nebulization 3 milliLiter(s) Nebulizer every 6 hours  aMIOdarone    Tablet 200 milliGRAM(s) Oral two times a day  aspirin  chewable 81 milliGRAM(s) Oral daily  atorvastatin Oral Tab/Cap - Peds 40 milliGRAM(s) Oral daily  BACItracin   Ointment 1 Application(s) Topical two times a day  cefTRIAXone   IVPB 1000 milliGRAM(s) IV Intermittent every 24 hours  chlorhexidine 0.12% Liquid 15 milliLiter(s) Oral Mucosa every 12 hours  chlorhexidine 4% Liquid 1 Application(s) Topical <User Schedule>  dexMEDEtomidine Infusion 0.2 MICROgram(s)/kG/Hr (3.3 mL/Hr) IV Continuous <Continuous>  dextrose 5% + sodium chloride 0.9%. 1000 milliLiter(s) (50 mL/Hr) IV Continuous <Continuous>  diVALproex Sprinkle 250 milliGRAM(s) Oral two times a day  doxercalciferol Injectable 2 MICROGram(s) IV Push <User Schedule>  epoetin mana-epbx (RETACRIT) Injectable 39754 Unit(s) IV Push <User Schedule>  fentaNYL   Infusion 0.5 MICROgram(s)/kG/Hr (2.9 mL/Hr) IV Continuous <Continuous>  heparin   Injectable 5000 Unit(s) SubCutaneous every 8 hours  levETIRAcetam  IVPB 375 milliGRAM(s) IV Intermittent every 12 hours  levETIRAcetam  IVPB 250 milliGRAM(s) IV Intermittent <User Schedule>  mupirocin 2% Ointment 1 Application(s) Topical two times a day  norepinephrine Infusion 0.05 MICROgram(s)/kG/Min (2.72 mL/Hr) IV Continuous <Continuous>  pantoprazole  Injectable 40 milliGRAM(s) IV Push daily  senna 2 Tablet(s) Oral daily    DVT Prophylaxis: heparin   Injectable 5000 Unit(s) SubCutaneous every 8 hours    GI Prophylaxis: pantoprazole  Injectable 40 milliGRAM(s) IV Push daily    Antibiotics: cefTRIAXone   IVPB 1000 milliGRAM(s) IV Intermittent every 24 hours    Anticoagulation:   Medications:[PRN]      Labs:                        8.7    5.78  )-----------( 136      ( 06 Apr 2021 08:19 )             27.8     04-06    132<L>  |  95<L>  |  40<H>  ----------------------------<  115<H>  4.1   |  23  |  4.6<HH>    Ca    8.8      06 Apr 2021 08:19  Phos  5.7     04-05  Mg     2.0     04-06    TPro  6.0  /  Alb  3.2<L>  /  TBili  0.3  /  DBili  x   /  AST  12  /  ALT  7   /  AlkPhos  135<H>  04-06    LIVER FUNCTIONS - ( 06 Apr 2021 08:19 )  Alb: 3.2 g/dL / Pro: 6.0 g/dL / ALK PHOS: 135 U/L / ALT: 7 U/L / AST: 12 U/L / GGT: x           PT/INR - ( 06 Apr 2021 08:19 )   PT: 12.80 sec;   INR: 1.11 ratio         PTT - ( 06 Apr 2021 08:19 )  PTT:31.7 sec  ABG - ( 06 Apr 2021 03:35 )  pH: 7.38  /  pCO2: 41    /  pO2: 158   / HCO3: 24    / Base Excess: -0.8  /  SaO2: 99

## 2021-04-06 NOTE — PROGRESS NOTE ADULT - SUBJECTIVE AND OBJECTIVE BOX
Ortho Preop Note    Patient is a 70y old  Female who presents with a chief complaint of S/p mechanical fall (05 Apr 2021 09:44)    Diagnosis: R intertrochanteric hip fracture  Procedure: R hip IMN                            8.7    5.78  )-----------( 136      ( 06 Apr 2021 08:19 )             27.8     04-06    132<L>  |  95<L>  |  40<H>  ----------------------------<  115<H>  4.1   |  23  |  4.6<HH>    Ca    8.8      06 Apr 2021 08:19  Phos  5.7     04-05  Mg     2.0     04-06    TPro  6.0  /  Alb  3.2<L>  /  TBili  0.3  /  DBili  x   /  AST  12  /  ALT  7   /  AlkPhos  135<H>  04-06    PT/INR - ( 06 Apr 2021 08:19 )   PT: 12.80 sec;   INR: 1.11 ratio         PTT - ( 06 Apr 2021 08:19 )  PTT:31.7 sec      [X] Type & Screen  [X] CBC  [X] BMP  [X] PT/PTT/INR  [X] Chest X-ray  [X] EKG  [X] NPO/IVF  [ ] Consent - TO BE DONE BY ATTENDING  [ ] Clearance - PENDING FORMAL CLEARANCE BY CCU TEAM  [X] Added on to OR Schedule  [ ] Anti-coagulation held       Assessment & Plan:  70y Female with RIGHT INTERTROCHANTERIC HIP FRACTURE  For OR 3/7

## 2021-04-06 NOTE — PROGRESS NOTE ADULT - ASSESSMENT
1. Odontoid fracture. Neurosurgery following.  2. R hip / R humerus fracture. Ortho following.  3. ESRD on HD TTS. HD today: 3 hours, opti 160 dialyzer, 2K bath, 2L UF.  4. Anemia. EPO with HD.  5. Secondary hyperparathyroidism. Hectorol with HD.

## 2021-04-06 NOTE — PROGRESS NOTE ADULT - SUBJECTIVE AND OBJECTIVE BOX
[place] NEPHROLOGY FOLLOW UP NOTE  --------------------------------------------------------------------------------  24 hour events/subjective: Patient examined. Intubated.    PAST HISTORY  --------------------------------------------------------------------------------  No significant changes to PMH, PSH, FHx, SHx, unless otherwise noted    ALLERGIES & MEDICATIONS  --------------------------------------------------------------------------------  Allergies    No Known Allergies    Intolerances      Standing Inpatient Medications  albuterol/ipratropium (CFC free) Inhaler. 1 Puff(s) Inhalation four times a day  aMIOdarone    Tablet 200 milliGRAM(s) Oral two times a day  aspirin  chewable 81 milliGRAM(s) Oral daily  atorvastatin Oral Tab/Cap - Peds 40 milliGRAM(s) Oral daily  BACItracin   Ointment 1 Application(s) Topical two times a day  cefTRIAXone   IVPB 1000 milliGRAM(s) IV Intermittent every 24 hours  chlorhexidine 0.12% Liquid 15 milliLiter(s) Oral Mucosa every 12 hours  chlorhexidine 4% Liquid 1 Application(s) Topical <User Schedule>  dexMEDEtomidine Infusion 0.2 MICROgram(s)/kG/Hr IV Continuous <Continuous>  dextrose 5% + sodium chloride 0.9%. 1000 milliLiter(s) IV Continuous <Continuous>  diVALproex Sprinkle 250 milliGRAM(s) Oral two times a day  doxercalciferol Injectable 2 MICROGram(s) IV Push <User Schedule>  epoetin mana-epbx (RETACRIT) Injectable 70353 Unit(s) IV Push <User Schedule>  fentaNYL   Infusion. 0.5 MICROgram(s)/kG/Hr IV Continuous <Continuous>  heparin   Injectable 5000 Unit(s) SubCutaneous every 8 hours  levETIRAcetam  IVPB 375 milliGRAM(s) IV Intermittent every 12 hours  levETIRAcetam  IVPB 250 milliGRAM(s) IV Intermittent <User Schedule>  mupirocin 2% Ointment 1 Application(s) Topical two times a day  norepinephrine Infusion 0.05 MICROgram(s)/kG/Min IV Continuous <Continuous>  pantoprazole  Injectable 40 milliGRAM(s) IV Push daily  senna 2 Tablet(s) Oral daily      VITALS/PHYSICAL EXAM  --------------------------------------------------------------------------------  T(C): 36.7 (04-06-21 @ 12:00), Max: 37.1 (04-06-21 @ 00:00)  HR: 84 (04-06-21 @ 12:00) (48 - 100)  BP: --  RR: 16 (04-06-21 @ 12:00) (12 - 38)  SpO2: 100% (04-06-21 @ 12:00) (100% - 100%)  Wt(kg): --        04-05-21 @ 07:01  -  04-06-21 @ 07:00  --------------------------------------------------------  IN: 1705.5 mL / OUT: 225 mL / NET: 1480.5 mL    04-06-21 @ 07:01  -  04-06-21 @ 12:49  --------------------------------------------------------  IN: 348.8 mL / OUT: 2015 mL / NET: -1666.2 mL      Physical Exam:  	Gen: Intubated  	Pulm:  B/L scant rales  	CV: RRR, S1S2  	Abd: +BS, soft, nontender/nondistended  	: No suprapubic tenderness  	LE: Warm, no edema  	Vascular access: TDC    LABS/STUDIES  --------------------------------------------------------------------------------              8.7    5.78  >-----------<  136      [04-06-21 @ 08:19]              27.8     132  |  95  |  40  ----------------------------<  115      [04-06-21 @ 08:19]  4.1   |  23  |  4.6        Ca     8.8     [04-06-21 @ 08:19]      Mg     2.0     [04-06-21 @ 08:19]      Phos  5.7     [04-05-21 @ 04:30]    TPro  6.0  /  Alb  3.2  /  TBili  0.3  /  DBili  x   /  AST  12  /  ALT  7   /  AlkPhos  135  [04-06-21 @ 08:19]    PT/INR: PT 12.80, INR 1.11       [04-06-21 @ 08:19]  PTT: 31.7       [04-06-21 @ 08:19]      Creatinine Trend:  SCr 4.6 [04-06 @ 08:19]  SCr 3.8 [04-05 @ 04:30]  SCr 3.4 [04-04 @ 04:30]  SCr 4.9 [04-03 @ 04:50]  SCr 4.2 [04-02 @ 04:50]    Urinalysis - [04-02-21 @ 10:44]      Color Yellow / Appearance Turbid / SG 1.027 / pH 7.0      Gluc Negative / Ketone Negative  / Bili Negative / Urobili <2 mg/dL       Blood Moderate / Protein 100 mg/dL / Leuk Est Large / Nitrite Negative      RBC 14 / WBC >720 / Hyaline 16 / Gran  / Sq Epi  / Non Sq Epi 3 / Bacteria Moderate      Iron 55, TIBC 167, %sat 33      [05-27-20 @ 13:22]  Ferritin 636      [05-27-20 @ 13:22]  HbA1c 5.7      [11-09-19 @ 01:20]

## 2021-04-06 NOTE — PROGRESS NOTE ADULT - SUBJECTIVE AND OBJECTIVE BOX
HPI  Patient is a 70y old Female who presents with a chief complaint of S/p mechanical fall (2021 09:44)    Currently admitted to medicine with the primary diagnosis of Cardiopulmonary arrest       Today is hospital day 5d.     INTERVAL HPI / OVERNIGHT EVENTS:  Patient was examined and seen at bedside. Patient is intubated and sedated. Patient became hypotensive to 80/50, started on levo.     ROS: Otherwise unremarkable     PAST MEDICAL & SURGICAL HISTORY  Diabetes    Congestive heart failure (CHF)    Pleural effusion due to congestive heart failure    End stage renal disease  on dialysis Mon, Wed, Fr    Uterine prolapse    Chronic indwelling Castellon catheter    History of repair of hip fracture      S/P CABG (coronary artery bypass graft)  2019    History of thoracentesis    Chronic indwelling Castellon catheter      ALLERGIES  No Known Allergies    MEDICATIONS  STANDING MEDICATIONS  albuterol/ipratropium (CFC free) Inhaler. 1 Puff(s) Inhalation four times a day  aMIOdarone    Tablet 200 milliGRAM(s) Oral two times a day  aspirin  chewable 81 milliGRAM(s) Oral daily  atorvastatin Oral Tab/Cap - Peds 40 milliGRAM(s) Oral daily  BACItracin   Ointment 1 Application(s) Topical two times a day  cefTRIAXone   IVPB 1000 milliGRAM(s) IV Intermittent every 24 hours  chlorhexidine 0.12% Liquid 15 milliLiter(s) Oral Mucosa every 12 hours  chlorhexidine 4% Liquid 1 Application(s) Topical <User Schedule>  dexMEDEtomidine Infusion 0.2 MICROgram(s)/kG/Hr IV Continuous <Continuous>  dextrose 5% + sodium chloride 0.9%. 1000 milliLiter(s) IV Continuous <Continuous>  diVALproex Sprinkle 250 milliGRAM(s) Oral two times a day  doxercalciferol Injectable 2 MICROGram(s) IV Push <User Schedule>  epoetin mana-epbx (RETACRIT) Injectable 11328 Unit(s) IV Push <User Schedule>  fentaNYL   Infusion. 0.5 MICROgram(s)/kG/Hr IV Continuous <Continuous>  heparin   Injectable 5000 Unit(s) SubCutaneous every 8 hours  levETIRAcetam  IVPB 375 milliGRAM(s) IV Intermittent every 12 hours  levETIRAcetam  IVPB 250 milliGRAM(s) IV Intermittent <User Schedule>  mupirocin 2% Ointment 1 Application(s) Topical two times a day  norepinephrine Infusion 0.05 MICROgram(s)/kG/Min IV Continuous <Continuous>  pantoprazole  Injectable 40 milliGRAM(s) IV Push daily  senna 2 Tablet(s) Oral daily    PRN MEDICATIONS    VITALS:  T(F): 98.1  HR: 84  BP: --  RR: 16  SpO2: 100%    PHYSICAL EXAM  GEN: NAD, Resting comfortably in bed  PULM: Clear to auscultation bilaterally, No wheezes  CVS: Regular rate and rhythm, S1-S2, no murmurs  ABD: Soft, non-tender, non-distended, no guarding  EXT: No edema  NEURO: A&Ox3, no focal deficits    LABS                        8.7    5.78  )-----------( 136      ( 2021 08:19 )             27.8     04-06    132<L>  |  95<L>  |  40<H>  ----------------------------<  115<H>  4.1   |  23  |  4.6<HH>    Ca    8.8      2021 08:19  Phos  5.7     04-05  Mg     2.0     04-06    TPro  6.0  /  Alb  3.2<L>  /  TBili  0.3  /  DBili  x   /  AST  12  /  ALT  7   /  AlkPhos  135<H>  04-06    PT/INR - ( 2021 08:19 )   PT: 12.80 sec;   INR: 1.11 ratio         PTT - ( 2021 08:19 )  PTT:31.7 sec    ABG - ( 2021 03:35 )  pH, Arterial: 7.38  pH, Blood: x     /  pCO2: 41    /  pO2: 158   / HCO3: 24    / Base Excess: -0.8  /  SaO2: 99          RADIOLOGY    c< from: Xray Chest 1 View-PORTABLE IMMEDIATE (Xray Chest 1 View-PORTABLE IMMEDIATE .) (21 @ 11:09) >  EXAM:  XR CHEST PORTABLE IMMED 1V            PROCEDURE DATE:  2021            INTERPRETATION:  Work.      The left abdomen.    180 Clinical History / Reason for exam: Intubated patient    Comparison : Chest radiograph 2021.    Technique/Positionin frontal.    Findings:    Support devices: Stable positioning.    Cardiac/mediastinum/hilum: Cardiomegaly, thoracic aortic calcification, status post median sternotomy, left atrial appendage clip, CABG.    Lung parenchyma/Pleura: Bilateral opacities/pleural effusions.    Skeleton/soft tissues: Stable.    Impression:    Right pleural effusion, new. Bilateral opacities/left pleural effusion and cardiomegaly are stable.              JAY FRANCO MD; Attending Radiologist  This document has been electronically signed. 2021 12:56PM    < end of copied text >      Assessment/Plan:    69 y/o F with a pmhx of CAD s/p CABG (2019), HFrEF (EF 20%), AS, ESRD (MWF), bladder prolapse requiring chronic Castellon, Diabetes Type 2 on insulin presents from home s/p mechanical fall. Trauma workup in ED significant for R humeral fracture / R hip fracture / non displaced odontoid fracture/ acute rib fractures/ ? T6 vertebral fracture. In the ED patient arrested, ROSC achieved after 2 minutes of CPR. EKG at the time showed RBBB with wide complex tachy. Pt subsequently intubated and sedated, started on pressors. Pt Experienced seizure-like episode during weaning trial s/p 2mg Versed.  (21) Weaning trial passed. Patient was Extubated . 02 sat began to decrease to low 80s, tachypenic and cyanotic started on bipap with no improvement. Patient again became hypotensive and tachy. Went into Afib. s/p cardioversion x3 and started on amio       #Cardiac arrest in the setting of arythmia and CHF exacerbation   - Trops .22-->.24 -->.21-->.34  - CTA negative for PE   - Lower extremity duplex negative for DVT( 21)  - EP recs : Plan for CRT-D placement today before extubation due to C2 fracture. Will be scheduled for end of day due to Adenovirus infection.   - Patient is NPO for procedure   - On heparin subq   - F/u TSH results     # Seizure unknown etiology  - CT Head () negative   - Keppra 500mg adminstered. followed by 375mg BID with additional 250mg dose after dialysis  - Started on Depakote 250mg PO BID   - VEEG - bifrontal sharps but no seizure activity. Consistent with metabolic process.       # Aspiration Pneumonia vs Adenovirus Pneumonia   - CXR () Bilateral opacities, decreased. Stable cardiomegaly/left pleural effusion..  - ID Recs : Unasyn 3 gm iv q24h, D/c rocephin, Deep ET cultures  - Day 3 of Ceftriaxone     # Perirectal abscess  - surgery is following patient. Dr Mota   - s/p debridement over a month. No need for packing as per surgery     # RT Humerus/Rt femur fracture   - Ortho following  - Recs :  Ortho will continue to monitor patient's overall status, with plan for surgical hip stabilization dependent on patient's status/improvement, and discussion with family/NOK and/or patient if able.   - Pain control. Currently on Fentanyl drip     #Odontoid fracture   - Neurosurgery following patient   - Want to c/w collar      #ESRD  - Nephro following patient  - Last dialyzed  Tuesday ()    # h/o DM   - fingersticks tid  - hold home insulin  - insulin protocol if > 180    # DVT PPX: heparin subq  # GI PPX: PPI  # Fluids : D5 LR @ 50  # Diet: NPO   # Activity: bedrest  # Bowel regimen

## 2021-04-07 NOTE — PROGRESS NOTE ADULT - SUBJECTIVE AND OBJECTIVE BOX
INTERVAL HPI/OVERNIGHT EVENTS:  No acute events overnight. Remains on precedex, fentanyl, and levophed. Remains intubated on vent.     MEDICATIONS  (STANDING):  albuterol/ipratropium (CFC free) Inhaler. 1 Puff(s) Inhalation four times a day  aMIOdarone    Tablet 200 milliGRAM(s) Oral two times a day  aspirin  chewable 81 milliGRAM(s) Oral daily  atorvastatin Oral Tab/Cap - Peds 40 milliGRAM(s) Oral daily  BACItracin   Ointment 1 Application(s) Topical two times a day  cefTRIAXone   IVPB 1000 milliGRAM(s) IV Intermittent every 24 hours  chlorhexidine 0.12% Liquid 15 milliLiter(s) Oral Mucosa every 12 hours  chlorhexidine 4% Liquid 1 Application(s) Topical <User Schedule>  dexMEDEtomidine Infusion 0.2 MICROgram(s)/kG/Hr (3.3 mL/Hr) IV Continuous <Continuous>  dextrose 5% + sodium chloride 0.9%. 1000 milliLiter(s) (50 mL/Hr) IV Continuous <Continuous>  doxercalciferol Injectable 2 MICROGram(s) IV Push <User Schedule>  epoetin mana-epbx (RETACRIT) Injectable 40264 Unit(s) IV Push <User Schedule>  fentaNYL   Infusion. 0.5 MICROgram(s)/kG/Hr (2.9 mL/Hr) IV Continuous <Continuous>  heparin   Injectable 5000 Unit(s) SubCutaneous every 8 hours  levETIRAcetam  IVPB 375 milliGRAM(s) IV Intermittent every 12 hours  levETIRAcetam  IVPB 250 milliGRAM(s) IV Intermittent <User Schedule>  mupirocin 2% Ointment 1 Application(s) Topical two times a day  norepinephrine Infusion 0.05 MICROgram(s)/kG/Min (5.44 mL/Hr) IV Continuous <Continuous>  pantoprazole  Injectable 40 milliGRAM(s) IV Push daily  senna 2 Tablet(s) Oral daily  valproic  acid Syrup 250 milliGRAM(s) Oral two times a day    MEDICATIONS  (PRN):      Allergies  No Known Allergies    REVIEW OF SYSTEMS    [ ] A ten-point review of systems was otherwise negative except as noted.  [x] Due to altered mental status/intubation, subjective information were not able to be obtained from the patient. History was obtained, to the extent possible, from review of the chart and collateral sources of information.      Vital Signs Last 24 Hrs  T(C): 37.2 (07 Apr 2021 08:00), Max: 37.6 (06 Apr 2021 19:00)  T(F): 99 (07 Apr 2021 08:00), Max: 99.7 (06 Apr 2021 19:00)  HR: 88 (07 Apr 2021 09:00) (78 - 100)  BP: 119/63 (07 Apr 2021 09:00) (78/46 - 148/73)  BP(mean): 89 (07 Apr 2021 09:00) (51 - 102)  RR: 20 (07 Apr 2021 09:00) (12 - 46)  SpO2: 100% (07 Apr 2021 09:00) (96% - 100%)    04-06-21 @ 07:01  -  04-07-21 @ 07:00  --------------------------------------------------------  IN: 2217 mL / OUT: 2193 mL / NET: 24 mL    04-07-21 @ 07:01  -  04-07-21 @ 09:25  --------------------------------------------------------  IN: 86 mL / OUT: 5 mL / NET: 81 mL    Physical Exam  GENERAL: In no apparent distress, well nourished, and hydrated.  HEART: Regular, bradycardia; + murmur  PULMONARY: Intubated, rhonchi  ABDOMEN: Soft, Nontender, Nondistended; Bowel sounds present  EXTREMITIES:  2+ Peripheral Pulses, No clubbing, cyanosis, or edema  NEUROLOGICAL: Sedated, Withdraws to pain    LABS:                        8.4    4.96  )-----------( 121      ( 07 Apr 2021 04:20 )             26.8     04-07    144  |  107  |  23<H>  ----------------------------<  140<H>  3.8   |  26  |  3.2<H>    Ca    8.0<L>      07 Apr 2021 04:20  Phos  4.2     04-07  Mg     1.9     04-07    TPro  5.4<L>  /  Alb  3.0<L>  /  TBili  0.3  /  DBili  x   /  AST  12  /  ALT  7   /  AlkPhos  133<H>  04-07    PT/INR - ( 07 Apr 2021 04:20 )   PT: 14.10 sec;   INR: 1.23 ratio         PTT - ( 07 Apr 2021 04:20 )  PTT:32.8 sec      04-06-21 @ 07:01  -  04-07-21 @ 07:00  --------------------------------------------------------  IN: 2217 mL / OUT: 2193 mL / NET: 24 mL    04-07-21 @ 07:01  -  04-07-21 @ 09:25  --------------------------------------------------------  IN: 86 mL / OUT: 5 mL / NET: 81 mL      04-06-21 @ 07:01  -  04-07-21 @ 07:00  --------------------------------------------------------  IN: 2217 mL / OUT: 2193 mL / NET: 24 mL    04-07-21 @ 07:01  -  04-07-21 @ 09:25  --------------------------------------------------------  IN: 86 mL / OUT: 5 mL / NET: 81 mL      RADIOLOGY & ADDITIONAL TESTS:  < from: TTE Echo Complete w/o Contrast w/ Doppler (04.04.21 @ 10:24) >  Summary:   1. LV Ejection Fraction by Renner's Method with a biplane EF of 38 %.   2. Moderately decreased global left ventricular systolic function.   3. Entire inferior wall appears hypokinetic.   4. Mild concentric left ventricular hypertrophy.   5. Severely reduced RV systolic function.   6. Severely enlarged right ventricle.   7. Severely enlarged left atrium.   8. Peak transaortic gradient equals 48.8 mmHg, mean transaortic gradient equals 24.7 mmHg, the calculated aortic valve area equals 0.75 cm² by the continuity equation consistent with severe aortic stenosis.   9. The mitral valve leaflets are tethered due to reduced systolic function and elevated LVDP.  10. Degenerative mitral valve.  11. Moderate mitral annular calcification.  12. Severe mitral regurgitation.  13. Moderate tricuspid regurgitation.  14. Estimated pulmonary artery systolic pressure is 49.7 mmHg assuming a right atrial pressure of 15 mmHg, which is consistent with mild pulmonary hypertension.    PHYSICIAN INTERPRETATION:  Left Ventricle: The left ventricular internal cavity size is normal. Left ventricular wall thickness is mildly increased. There is mild concentric left ventricular hypertrophy. Global LV systolic function was moderately decreased. Entire inferior wall appears hypokinetic.  Right Ventricle: The right ventricular size is severely enlarged. RV systolic function is severely reduced.  Left Atrium: Severely enlarged left atrium.  Right Atrium: Moderately enlarged right atrium.  Pericardium:There is no evidence of pericardial effusion.  Mitral Valve: The mitral valve is degenerative in appearance. The mitral valve leaflets are tethered which is due to reduced systolic function and elevated LVDP. There is moderate mitral annular calcification. Severe mitral regurgitation.  Tricuspid Valve: The tricuspid valve is normal in structure. Moderate tricuspid regurgitation is visualized. Estimated pulmonary artery systolic pressure is 49.7 mmHg assuming a right atrial pressure of 15 mmHg, which is consistent with mild pulmonary hypertension.  Aortic Valve: The aortic valve is trileaflet. Sclerotic aortic valve with decreased opening. Peak transaortic gradient equals 48.8 mmHg, mean transaortic gradient equals 24.7 mmHg, the calculated aortic valve area equals 0.75 cm² by the continuity equation consistent with severe aortic stenosis. No aortic regurgitation.  Pulmonic Valve: The pulmonic valve is normal. Mild pulmonic regurgitation.  Aorta: Aortic root measured at sinotubular junction is normal.    < end of copied text >     INTERVAL HPI/OVERNIGHT EVENTS:  No acute events overnight. Remains on precedex, fentanyl, and levophed. Remains intubated on vent.     MEDICATIONS  (STANDING):  albuterol/ipratropium (CFC free) Inhaler. 1 Puff(s) Inhalation four times a day  aMIOdarone    Tablet 200 milliGRAM(s) Oral two times a day  aspirin  chewable 81 milliGRAM(s) Oral daily  atorvastatin Oral Tab/Cap - Peds 40 milliGRAM(s) Oral daily  BACItracin   Ointment 1 Application(s) Topical two times a day  cefTRIAXone   IVPB 1000 milliGRAM(s) IV Intermittent every 24 hours  chlorhexidine 0.12% Liquid 15 milliLiter(s) Oral Mucosa every 12 hours  chlorhexidine 4% Liquid 1 Application(s) Topical <User Schedule>  dexMEDEtomidine Infusion 0.2 MICROgram(s)/kG/Hr (3.3 mL/Hr) IV Continuous <Continuous>  dextrose 5% + sodium chloride 0.9%. 1000 milliLiter(s) (50 mL/Hr) IV Continuous <Continuous>  doxercalciferol Injectable 2 MICROGram(s) IV Push <User Schedule>  epoetin mana-epbx (RETACRIT) Injectable 94201 Unit(s) IV Push <User Schedule>  fentaNYL   Infusion. 0.5 MICROgram(s)/kG/Hr (2.9 mL/Hr) IV Continuous <Continuous>  heparin   Injectable 5000 Unit(s) SubCutaneous every 8 hours  levETIRAcetam  IVPB 375 milliGRAM(s) IV Intermittent every 12 hours  levETIRAcetam  IVPB 250 milliGRAM(s) IV Intermittent <User Schedule>  mupirocin 2% Ointment 1 Application(s) Topical two times a day  norepinephrine Infusion 0.05 MICROgram(s)/kG/Min (5.44 mL/Hr) IV Continuous <Continuous>  pantoprazole  Injectable 40 milliGRAM(s) IV Push daily  senna 2 Tablet(s) Oral daily  valproic  acid Syrup 250 milliGRAM(s) Oral two times a day    MEDICATIONS  (PRN):      Allergies  No Known Allergies    REVIEW OF SYSTEMS    [ ] A ten-point review of systems was otherwise negative except as noted.  [x] Due to altered mental status/intubation, subjective information were not able to be obtained from the patient. History was obtained, to the extent possible, from review of the chart and collateral sources of information.      Vital Signs Last 24 Hrs  T(C): 37.2 (07 Apr 2021 08:00), Max: 37.6 (06 Apr 2021 19:00)  T(F): 99 (07 Apr 2021 08:00), Max: 99.7 (06 Apr 2021 19:00)  HR: 88 (07 Apr 2021 09:00) (78 - 100)  BP: 119/63 (07 Apr 2021 09:00) (78/46 - 148/73)  BP(mean): 89 (07 Apr 2021 09:00) (51 - 102)  RR: 20 (07 Apr 2021 09:00) (12 - 46)  SpO2: 100% (07 Apr 2021 09:00) (96% - 100%)    04-06-21 @ 07:01  -  04-07-21 @ 07:00  --------------------------------------------------------  IN: 2217 mL / OUT: 2193 mL / NET: 24 mL    04-07-21 @ 07:01  -  04-07-21 @ 09:25  --------------------------------------------------------  IN: 86 mL / OUT: 5 mL / NET: 81 mL    Physical Exam  GENERAL: In no apparent distress, well nourished, and hydrated.  HEART: Regular, bradycardia; + murmur  PULMONARY: Intubated, rhonchi (anterior)  ABDOMEN: Soft, Nontender, Nondistended; Bowel sounds present  EXTREMITIES:  2+ Peripheral Pulses, No clubbing, cyanosis, or edema  NEUROLOGICAL: Sedated, opens eyes to voice, follows simple commands    LABS:                        8.4    4.96  )-----------( 121      ( 07 Apr 2021 04:20 )             26.8     04-07    144  |  107  |  23<H>  ----------------------------<  140<H>  3.8   |  26  |  3.2<H>    Ca    8.0<L>      07 Apr 2021 04:20  Phos  4.2     04-07  Mg     1.9     04-07    TPro  5.4<L>  /  Alb  3.0<L>  /  TBili  0.3  /  DBili  x   /  AST  12  /  ALT  7   /  AlkPhos  133<H>  04-07    PT/INR - ( 07 Apr 2021 04:20 )   PT: 14.10 sec;   INR: 1.23 ratio         PTT - ( 07 Apr 2021 04:20 )  PTT:32.8 sec      04-06-21 @ 07:01  -  04-07-21 @ 07:00  --------------------------------------------------------  IN: 2217 mL / OUT: 2193 mL / NET: 24 mL    04-07-21 @ 07:01  -  04-07-21 @ 09:25  --------------------------------------------------------  IN: 86 mL / OUT: 5 mL / NET: 81 mL      04-06-21 @ 07:01 - 04-07-21 @ 07:00  --------------------------------------------------------  IN: 2217 mL / OUT: 2193 mL / NET: 24 mL    04-07-21 @ 07:01  -  04-07-21 @ 09:25  --------------------------------------------------------  IN: 86 mL / OUT: 5 mL / NET: 81 mL      RADIOLOGY & ADDITIONAL TESTS:  < from: TTE Echo Complete w/o Contrast w/ Doppler (04.04.21 @ 10:24) >  Summary:   1. LV Ejection Fraction by Renner's Method with a biplane EF of 38 %.   2. Moderately decreased global left ventricular systolic function.   3. Entire inferior wall appears hypokinetic.   4. Mild concentric left ventricular hypertrophy.   5. Severely reduced RV systolic function.   6. Severely enlarged right ventricle.   7. Severely enlarged left atrium.   8. Peak transaortic gradient equals 48.8 mmHg, mean transaortic gradient equals 24.7 mmHg, the calculated aortic valve area equals 0.75 cm² by the continuity equation consistent with severe aortic stenosis.   9. The mitral valve leaflets are tethered due to reduced systolic function and elevated LVDP.  10. Degenerative mitral valve.  11. Moderate mitral annular calcification.  12. Severe mitral regurgitation.  13. Moderate tricuspid regurgitation.  14. Estimated pulmonary artery systolic pressure is 49.7 mmHg assuming a right atrial pressure of 15 mmHg, which is consistent with mild pulmonary hypertension.    PHYSICIAN INTERPRETATION:  Left Ventricle: The left ventricular internal cavity size is normal. Left ventricular wall thickness is mildly increased. There is mild concentric left ventricular hypertrophy. Global LV systolic function was moderately decreased. Entire inferior wall appears hypokinetic.  Right Ventricle: The right ventricular size is severely enlarged. RV systolic function is severely reduced.  Left Atrium: Severely enlarged left atrium.  Right Atrium: Moderately enlarged right atrium.  Pericardium:There is no evidence of pericardial effusion.  Mitral Valve: The mitral valve is degenerative in appearance. The mitral valve leaflets are tethered which is due to reduced systolic function and elevated LVDP. There is moderate mitral annular calcification. Severe mitral regurgitation.  Tricuspid Valve: The tricuspid valve is normal in structure. Moderate tricuspid regurgitation is visualized. Estimated pulmonary artery systolic pressure is 49.7 mmHg assuming a right atrial pressure of 15 mmHg, which is consistent with mild pulmonary hypertension.  Aortic Valve: The aortic valve is trileaflet. Sclerotic aortic valve with decreased opening. Peak transaortic gradient equals 48.8 mmHg, mean transaortic gradient equals 24.7 mmHg, the calculated aortic valve area equals 0.75 cm² by the continuity equation consistent with severe aortic stenosis. No aortic regurgitation.  Pulmonic Valve: The pulmonic valve is normal. Mild pulmonic regurgitation.  Aorta: Aortic root measured at sinotubular junction is normal.    < end of copied text >

## 2021-04-07 NOTE — PROGRESS NOTE ADULT - SUBJECTIVE AND OBJECTIVE BOX
Patient is a 70y old  Female who presents with a chief complaint of right hip (07 Apr 2021 11:53)        pt remains critically ill on MV        ROS:     All ROS are negative except HPI         PHYSICAL EXAM    ICU Vital Signs Last 24 Hrs  T(C): 37.1 (07 Apr 2021 12:00), Max: 37.6 (06 Apr 2021 19:00)  T(F): 98.8 (07 Apr 2021 12:00), Max: 99.7 (06 Apr 2021 19:00)  HR: 84 (07 Apr 2021 14:00) (78 - 98)  BP: 115/68 (07 Apr 2021 14:00) (78/46 - 148/73)  BP(mean): 97 (07 Apr 2021 12:00) (51 - 102)  ABP: 78/68 (07 Apr 2021 04:00) (78/68 - 118/60)  ABP(mean): 74 (07 Apr 2021 04:00) (60 - 88)  RR: 13 (07 Apr 2021 14:00) (13 - 46)  SpO2: 100% (07 Apr 2021 14:00) (96% - 100%)      CONSTITUTIONAL:  pt remains critically ill on MV    ENT:   pos et tube    EYES:   Pupils equal,   Round and reactive to light.    CARDIAC:   Normal rate,   Regular rhythm.    No edema      Vascular:  Normal systolic impulse  No Carotid bruits    RESPIRATORY:   No wheezing  Bilateral BS  Normal chest expansion  Not tachypneic,  No use of accessory muscles    GASTROINTESTINAL:  Abdomen soft,   Non-tender,   No guarding,   + BS    MUSCULOSKELETAL:   Range of motion is not limited,  No clubbing, cyanosis    NEUROLOGICAL:   sedated on MV    SKIN:   Skin normal color for race,   Warm and dry and intact.   No evidence of rash.    PSYCHIATRIC:   sedated on mV    HEMATOLOGICAL:  No cervical  lymphadenopathy.  no inguinal lymphadenopathy      04-06-21 @ 07:01  -  04-07-21 @ 07:00  --------------------------------------------------------  IN:    Dexmedetomidine: 325.5 mL    dextrose 5% + sodium chloride 0.9%: 1200 mL    Enteral Tube Flush: 270 mL    FentaNYL: 50.4 mL    FentaNYL: 19.6 mL    IV PiggyBack: 300 mL    Norepinephrine: 20 mL    Norepinephrine: 31.5 mL  Total IN: 2217 mL    OUT:    Indwelling Catheter - Urethral (mL): 193 mL    Other (mL): 2000 mL  Total OUT: 2193 mL    Total NET: 24 mL      04-07-21 @ 07:01  -  04-07-21 @ 14:56  --------------------------------------------------------  IN:    Dexmedetomidine: 101.5 mL    dextrose 5% + sodium chloride 0.9%: 50 mL    Enteral Tube Flush: 100 mL    FentaNYL: 23 mL    FentaNYL: 16 mL    Norepinephrine: 18 mL  Total IN: 308.5 mL    OUT:    Indwelling Catheter - Urethral (mL): 35 mL  Total OUT: 35 mL    Total NET: 273.5 mL          LABS:                            8.4    4.96  )-----------( 121      ( 07 Apr 2021 04:20 )             26.8                                               04-07    144  |  107  |  23<H>  ----------------------------<  140<H>  3.8   |  26  |  3.2<H>    Ca    8.0<L>      07 Apr 2021 04:20  Phos  4.2     04-07  Mg     1.9     04-07    TPro  5.4<L>  /  Alb  3.0<L>  /  TBili  0.3  /  DBili  x   /  AST  12  /  ALT  7   /  AlkPhos  133<H>  04-07      PT/INR - ( 07 Apr 2021 04:20 )   PT: 14.10 sec;   INR: 1.23 ratio         PTT - ( 07 Apr 2021 04:20 )  PTT:32.8 sec                                                                                     LIVER FUNCTIONS - ( 07 Apr 2021 04:20 )  Alb: 3.0 g/dL / Pro: 5.4 g/dL / ALK PHOS: 133 U/L / ALT: 7 U/L / AST: 12 U/L / GGT: x                                                                                               Mode: AC/ CMV (Assist Control/ Continuous Mandatory Ventilation)  RR (machine): 12  TV (machine): 350  FiO2: 30  PEEP: 5                                      ABG - ( 07 Apr 2021 09:06 )  pH, Arterial: 7.40  pH, Blood: x     /  pCO2: 43    /  pO2: 87    / HCO3: 27    / Base Excess: 1.9   /  SaO2: 96                  MEDICATIONS  (STANDING):  albuterol/ipratropium (CFC free) Inhaler. 1 Puff(s) Inhalation four times a day  aMIOdarone    Tablet 200 milliGRAM(s) Oral two times a day  aspirin  chewable 81 milliGRAM(s) Oral daily  atorvastatin Oral Tab/Cap - Peds 40 milliGRAM(s) Oral daily  BACItracin   Ointment 1 Application(s) Topical two times a day  cefTRIAXone   IVPB 1000 milliGRAM(s) IV Intermittent every 24 hours  chlorhexidine 0.12% Liquid 15 milliLiter(s) Oral Mucosa every 12 hours  chlorhexidine 4% Liquid 1 Application(s) Topical <User Schedule>  dexMEDEtomidine Infusion 0.2 MICROgram(s)/kG/Hr (3.3 mL/Hr) IV Continuous <Continuous>  doxercalciferol Injectable 2 MICROGram(s) IV Push <User Schedule>  epoetin mana-epbx (RETACRIT) Injectable 24103 Unit(s) IV Push <User Schedule>  fentaNYL   Infusion. 0.5 MICROgram(s)/kG/Hr (2.9 mL/Hr) IV Continuous <Continuous>  heparin   Injectable 5000 Unit(s) SubCutaneous every 8 hours  levETIRAcetam  IVPB 375 milliGRAM(s) IV Intermittent every 12 hours  levETIRAcetam  IVPB 250 milliGRAM(s) IV Intermittent <User Schedule>  mupirocin 2% Ointment 1 Application(s) Topical two times a day  norepinephrine Infusion 0.05 MICROgram(s)/kG/Min (5.44 mL/Hr) IV Continuous <Continuous>  pantoprazole  Injectable 40 milliGRAM(s) IV Push daily  senna 2 Tablet(s) Oral daily  valproic  acid Syrup 250 milliGRAM(s) Oral two times a day    MEDICATIONS  (PRN):      New X-rays reviewed:                                                                                  ECHO    CXR interpreted by me:

## 2021-04-07 NOTE — PROGRESS NOTE ADULT - ASSESSMENT
1. Odontoid fracture. Neurosurgery following.  2. R hip / R humerus fracture. Ortho following.  3. ESRD on HD TTS. HD tomorrow: 3 hours, opti 160 dialyzer, 3K bath, 2L UF.  4. Anemia. EPO with HD.  5. Secondary hyperparathyroidism. Hectorol with HD.

## 2021-04-07 NOTE — PROGRESS NOTE ADULT - ASSESSMENT
· Assessment	  71 yo F with PMH of CAD s/p CABG (November 2019), HFrEF (EF 20%), AS, ESRD (MWF), bladder prolapse requiring chronic Castellon, Diabetes Type 2 on insulin presents from home s/p mechanical fall. When the history was taken in the emergency department, she The denied any dizziness/ palpitations/ aura/vertigo. She reported non-radiating R shoulder pain and non-radiating R hip pain on arrival. She was found to have R humeral fracture / R hip fracture / non displaced odontoid fracture/ acute rib fractures/ ? T6 vertebral fracture. In the emergency department, while the patient was receiving trauma work up she was found pulseless by a PCA in the room ( she was not on the monitor at that time) and had a cardiac arrest. ROSC was achieved after 2 minutes of CPR (1 epinephrine was given and 1 calcium chloride iv). Patient was intubated and sedated. EKG showed RBBB with wide complex tachycardia. Per family at the bedside, the patient had her last hemodialysis yesterday and has been relatively non compliant with her medications and medical care at home. Unable to obtain ros as patient is sedated.       IMPRESSION;  Adenovirus > no convincing evidence of a PNA caused by Adenovirus  Doubt bacterial PNA  CT with Bilateral groundglass and consolidative opacities, most pronounced at right base, with scattered areas of interlobular septal thickening. Left lower lobe atelectasis. Small bilateral pleural effusions.  S/p cardiac arrest with a high probability of aspiration with chemical pneumontisi  Pt with Adenovirus pneumonia & recent cardiopulmonary arrest. Vented. R/O gram negative pneumonia in pt with DM.  Pts generally need bacterial coverage  WBC 4.5  COVID-19 NG  Nares ORSA. No ORSA PNA  Blood & Urine cxs NGTD 4/2    RECOMMENDATIONS;  Unasyn 3 gm iv q24h for 7 days  recall prn please

## 2021-04-07 NOTE — PRE-ANESTHESIA EVALUATION ADULT - NSANTHPMHFT_GEN_ALL_CORE
Patient with multiple comorbidities, awaiting ICD r/t CHF  (EF 38%, severe AS). Discussed with Dr. Cobos (Anesthesia - [id number])  Would recommend patient have a clearance from cardiology regarding if patient would require a lifevest / cardiac pads on for the duration of any procedure.   -Was called from ICU team regarding dialysis: recommend dialysis day before procedure d/t avoidance of any hemodynamic shifts / instability   -Would recommend ICD be placed prior to right hip ORIF d/t risk/benefit of the ICD and any spontaneous hemodynamic issues due to perioperative procedure.   -Spoke with ICU team ([id number]) regarding multiple concerns with patient.           1. Cardiac Arrest: secondary to VFIB in nature, continue supportive care, monitor for now. Monitor for now.    2. Acute Hypoxic Respiratory Failure: secondary to septic/ cardiogenic shock, PNA, continue on lung protective mechanical ventilation for now, will wean FIO2 for sats greater then 92%,  tx nebs, pulmonary toilet, continue supportive care monitor for now. Check SAT and SBT.    3. Mixed Cardiogenic/Septic Shock: secondary to PNA, will continue empiric abx as per ID, on levophed gtt will wean for maps greater then 65, continue monitor for now.    4. Acute Biventricular CHF EF 38% in setting of Severe Aortic stenosis and Mod-Severe MR: continue supportive care will need eventual BiVICD by EP, monitor for now.    5. PNA: continue empiric abx as per ID, continue pulmonary toilet tx nebs, monitor for now, f/u Cx's.    6. VFIB: tx amio, most likley ischemic in nature. F/u Cardiology recs.    7. B/l Rib Fractures: from fall continue pain control.    8. Right Humeral Fracture: continue supportive care as per Ortho, monitor for now.    9. Right Intertrochanteric Fracture: continue supportive care as per Ortho, monitor for now. awaiting surgery.    10. Odontoid Fracture: continue nec collar as per Neurology f/u Recs.    11. Seizures: tx AED's as per Neurology monitor for now.    12. Anemia: secondary to hip fracture and AOC transfuse as needed for less then 7, continue supportive care, monitor for now.    13. Sacral Decubitus/ Abscess s/p I/D, f/u surgery recs.    14. CAD s/p CABG: tx ASA, continue supportive care, monitor for now.    15. Bladder Prolapse with Chronic Castellon: continue Castellon monitor for now.    16. DM: tx inuslin sliding scale, monitor BSG's, continue supportive care.    17. ESRD on HD: continue renal replacement therapy as per nephrology, monitor for now.    18. DVT/ GI px: tx PPI/ hep subcut. lower ext US neg for DVT.    19. NUT: tx IVF's resume TF's. after OR Patient with multiple comorbidities, awaiting ICD r/t CHF  (EF 38%, severe AS). Discussed with Dr. Cobos (Anesthesia - [id number])  Would recommend patient have a clearance from cardiology regarding if patient would require a lifevest / cardiac pads on for the duration of any procedure.   -Was called from ICU team regarding dialysis: recommend dialysis day before procedure d/t avoidance of any hemodynamic shifts / instability   -Would recommend ICD be placed prior to right hip ORIF d/t risk/benefit of the ICD and any spontaneous hemodynamic issues due to perioperative procedure.   -Patient still remains on Levophed  -Spoke with ICU team ([id number]) regarding multiple concerns with patient.           1. Cardiac Arrest: secondary to VFIB in nature, continue supportive care, monitor for now. Monitor for now.    2. Acute Hypoxic Respiratory Failure: secondary to septic/ cardiogenic shock, PNA, continue on lung protective mechanical ventilation for now, will wean FIO2 for sats greater then 92%,  tx nebs, pulmonary toilet, continue supportive care monitor for now. Check SAT and SBT.    3. Mixed Cardiogenic/Septic Shock: secondary to PNA, will continue empiric abx as per ID, on levophed gtt will wean for maps greater then 65, continue monitor for now.    4. Acute Biventricular CHF EF 38% in setting of Severe Aortic stenosis and Mod-Severe MR: continue supportive care will need eventual BiVICD by EP, monitor for now.    5. PNA: continue empiric abx as per ID, continue pulmonary toilet tx nebs, monitor for now, f/u Cx's.    6. VFIB: tx amio, most likley ischemic in nature. F/u Cardiology recs.    7. B/l Rib Fractures: from fall continue pain control.    8. Right Humeral Fracture: continue supportive care as per Ortho, monitor for now.    9. Right Intertrochanteric Fracture: continue supportive care as per Ortho, monitor for now. awaiting surgery.    10. Odontoid Fracture: continue nec collar as per Neurology f/u Recs.    11. Seizures: tx AED's as per Neurology monitor for now.    12. Anemia: secondary to hip fracture and AOC transfuse as needed for less then 7, continue supportive care, monitor for now.    13. Sacral Decubitus/ Abscess s/p I/D, f/u surgery recs.    14. CAD s/p CABG: tx ASA, continue supportive care, monitor for now.    15. Bladder Prolapse with Chronic Castellon: continue Castellon monitor for now.    16. DM: tx inuslin sliding scale, monitor BSG's, continue supportive care.    17. ESRD on HD: continue renal replacement therapy as per nephrology, monitor for now.    18. DVT/ GI px: tx PPI/ hep subcut. lower ext US neg for DVT.    19. NUT: tx IVF's resume TF's. after OR

## 2021-04-07 NOTE — PROGRESS NOTE ADULT - SUBJECTIVE AND OBJECTIVE BOX
[facility] FOLLOW UP NOTE  --------------------------------------------------------------------------------  24 hour events/subjective: Patient examined. Intubated.    PAST HISTORY  --------------------------------------------------------------------------------  No significant changes to PMH, PSH, FHx, SHx, unless otherwise noted    ALLERGIES & MEDICATIONS  --------------------------------------------------------------------------------  Allergies    No Known Allergies    Intolerances      Standing Inpatient Medications  albuterol/ipratropium (CFC free) Inhaler. 1 Puff(s) Inhalation four times a day  aMIOdarone    Tablet 200 milliGRAM(s) Oral two times a day  aspirin  chewable 81 milliGRAM(s) Oral daily  atorvastatin Oral Tab/Cap - Peds 40 milliGRAM(s) Oral daily  BACItracin   Ointment 1 Application(s) Topical two times a day  cefTRIAXone   IVPB 1000 milliGRAM(s) IV Intermittent every 24 hours  chlorhexidine 0.12% Liquid 15 milliLiter(s) Oral Mucosa every 12 hours  chlorhexidine 4% Liquid 1 Application(s) Topical <User Schedule>  dexMEDEtomidine Infusion 0.2 MICROgram(s)/kG/Hr IV Continuous <Continuous>  doxercalciferol Injectable 2 MICROGram(s) IV Push <User Schedule>  epoetin mana-epbx (RETACRIT) Injectable 49468 Unit(s) IV Push <User Schedule>  fentaNYL   Infusion. 0.5 MICROgram(s)/kG/Hr IV Continuous <Continuous>  heparin   Injectable 5000 Unit(s) SubCutaneous every 8 hours  levETIRAcetam  IVPB 375 milliGRAM(s) IV Intermittent every 12 hours  levETIRAcetam  IVPB 250 milliGRAM(s) IV Intermittent <User Schedule>  mupirocin 2% Ointment 1 Application(s) Topical two times a day  norepinephrine Infusion 0.05 MICROgram(s)/kG/Min IV Continuous <Continuous>  pantoprazole  Injectable 40 milliGRAM(s) IV Push daily  senna 2 Tablet(s) Oral daily  valproic  acid Syrup 250 milliGRAM(s) Oral two times a day    PRN Inpatient Medications      VITALS/PHYSICAL EXAM  --------------------------------------------------------------------------------  T(C): 37.2 (04-07-21 @ 10:22), Max: 37.6 (04-06-21 @ 19:00)  HR: 84 (04-07-21 @ 11:00) (78 - 94)  BP: 114/71 (04-07-21 @ 11:00) (78/46 - 148/73)  RR: 13 (04-07-21 @ 11:00) (12 - 46)  SpO2: 100% (04-07-21 @ 11:00) (96% - 100%)  Wt(kg): --  Height (cm): 157.5 (04-07-21 @ 10:22)  Weight (kg): 58 (04-07-21 @ 10:22)  BMI (kg/m2): 23.4 (04-07-21 @ 10:22)  BSA (m2): 1.58 (04-07-21 @ 10:22)      04-06-21 @ 07:01  -  04-07-21 @ 07:00  --------------------------------------------------------  IN: 2217 mL / OUT: 2193 mL / NET: 24 mL    04-07-21 @ 07:01  -  04-07-21 @ 12:00  --------------------------------------------------------  IN: 233 mL / OUT: 35 mL / NET: 198 mL      Physical Exam:  	Gen: Intubated  	Pulm:  B/L scant rales  	CV: RRR, S1S2  	Abd: +BS, soft, nondistended  	: No suprapubic tenderness  	LE: Warm, no edema  	Vascular access: TDC    LABS/STUDIES  --------------------------------------------------------------------------------              8.4    4.96  >-----------<  121      [04-07-21 @ 04:20]              26.8     144  |  107  |  23  ----------------------------<  140      [04-07-21 @ 04:20]  3.8   |  26  |  3.2        Ca     8.0     [04-07-21 @ 04:20]      Mg     1.9     [04-07-21 @ 04:20]      Phos  4.2     [04-07-21 @ 04:20]    TPro  5.4  /  Alb  3.0  /  TBili  0.3  /  DBili  x   /  AST  12  /  ALT  7   /  AlkPhos  133  [04-07-21 @ 04:20]    PT/INR: PT 14.10, INR 1.23       [04-07-21 @ 04:20]  PTT: 32.8       [04-07-21 @ 04:20]      Creatinine Trend:  SCr 3.2 [04-07 @ 04:20]  SCr 4.6 [04-06 @ 08:19]  SCr 3.8 [04-05 @ 04:30]  SCr 3.4 [04-04 @ 04:30]  SCr 4.9 [04-03 @ 04:50]    Urinalysis - [04-02-21 @ 10:44]      Color Yellow / Appearance Turbid / SG 1.027 / pH 7.0      Gluc Negative / Ketone Negative  / Bili Negative / Urobili <2 mg/dL       Blood Moderate / Protein 100 mg/dL / Leuk Est Large / Nitrite Negative      RBC 14 / WBC >720 / Hyaline 16 / Gran  / Sq Epi  / Non Sq Epi 3 / Bacteria Moderate      Iron 55, TIBC 167, %sat 33      [05-27-20 @ 13:22]  Ferritin 636      [05-27-20 @ 13:22]  HbA1c 5.7      [11-09-19 @ 01:20]  TSH 1.71      [04-06-21 @ 04:40]

## 2021-04-07 NOTE — PROGRESS NOTE ADULT - SUBJECTIVE AND OBJECTIVE BOX
Date of Admission: 4/1/21    Interval history: Patient remains intubated and sedated on MV; Still on Levophed gtt. Plan for ORIF today.       HISTORY OF PRESENT ILLNESS:     71 yo F with PMH of CAD s/p CABG (November 2019), HFrEF (EF 20%), AS, ESRD (MWF), bladder prolapse requiring chronic Castellon, Diabetes Type 2 on insulin presents from home s/p mechanical fall. When the history was taken in the emergency department, she The denied any dizziness/ palpitations/ aura/vertigo. She reported non-radiating R shoulder pain and non-radiating R hip pain on arrival. In the emergency department, while the patient was receiving trauma work up she was found pulseless by a PCA in the room, had a cardiac arrest. ROSC was achieved after 2 minutes of CPR (1 epinephrine was given and 1 calcium chloride iv). Patient was intubated and sedated. EKG showed RBBB with wide complex tachycardia.   Per chart the patient had her last hemodialysis yesterday and has been relatively non compliant with her medications and medical care at home.   Of note in a previous call to the heart failure team, patient reported falling asleep for few seconds while doing the dishes couple of times.      PAST MEDICAL & SURGICAL HISTORY:    Diabetes  Congestive heart failure (CHF)  Pleural effusion due to congestive heart failure  End stage renal disease on dialysis Mon, Wed, Fr  Uterine prolapse  Chronic indwelling Castellon catheter  History of repair of hip fracture 2019  S/P CABG (coronary artery bypass graft) 11/2019  History of thoracentesis  Chronic indwelling Castellon catheter      FAMILY HISTORY:    Mother: MI and stomach cancer    SOCIAL HISTORY:      Former smoker      Allergies    No Known Allergies    Intolerances      PHYSICAL EXAM:    General Appearance: well appearing, normal for age and gender. 	  Neck: normal JVP, no bruit.   Cardiovascular: regular rate and rhythm S1 S2, No JVD, No murmurs, No edema  Respiratory: Lungs clear to auscultation	  Psychiatry: sedated but opens eyes   Gastrointestinal:  Soft, Non-tender  Skin/Integumen: No rashes, No ecchymoses, No cyanosis	  Neurologic: Non-focal, opens eyes and understands when spoken to  Musculoskeletal/ extremities: Normal range of motion, No clubbing, cyanosis or edema  Vascular: Peripheral pulses palpable 2+ bilaterally  	    PREVIOUS DIAGNOSTIC TESTING:      TTE 5/17/21  Summary:   1. Left ventricular ejection fraction, by visual estimation, is 20 to 25%.   2. Severely decreased global left ventricular systolic function.   3. The mitral valve leaflets are tethered due to reduced systolic function and elevated LVDP.   4. Moderate mitral annular calcification.   5. Moderate-to-severe mitral regurgitation.   6. Peak transaortic gradient equals 25.2 mmHg, mean transaortic gradient equals 8.8 mmHg, the calculated aortic valve area equals 1.07 cm² by the continuity equation consistent with moderate aortic stenosis (possible LFLG).   7. Estimated JENNIFER = 1.24 cm2 by planimetry.   8. Trivial aortic regurgitation.   9. Moderate tricuspid regurgitation.  10. Estimated pulmonary artery systolic pressure is 44.5 mmHg assuming a right atrial pressure of 8 mmHg, which is consistent with mild pulmonary hypertension.  11. LA volume Index is 71.0 ml/m² ml/m2.  12. Bilateral pleural effusions.      Home Medications:  aspirin 81 mg oral tablet, chewable: 1 tab(s) orally once a day (01 Apr 2021 11:06)  atorvastatin 40 mg oral tablet: 1 tab(s) orally once a day (01 Apr 2021 11:06)  INSULIN LISPRO KWIKPEN  100 UNIT/ML SOPN:  (01 Apr 2021 11:06)  METOLAZONE  5 MG TABS: 1  orally once a day (01 Apr 2021 11:06)  METOPROLOL SUCCINATE ER  25 MG TB24: 1  orally 2 times a day (01 Apr 2021 11:06)  SERTRALINE HCL  50 MG TABS:  (01 Apr 2021 11:06)  SEVELAMER CARBONATE 800MG TABLETS: TK 2 TS PO TID WITH MEALS (01 Apr 2021 11:06)  TORSEMIDE 20MG TABLETS: TK 3 TS PO BID (01 Apr 2021 11:06)  VITAMIN D2 04900PH (ERGO) CAP RX: TK 1 C PO WEEKLY (01 Apr 2021 11:06)

## 2021-04-07 NOTE — PROGRESS NOTE ADULT - SUBJECTIVE AND OBJECTIVE BOX
HPI  Patient is a 70y old Female who presents with a chief complaint of right hip (07 Apr 2021 11:53)    Currently admitted to medicine with the primary diagnosis of Cardiopulmonary arrest       Today is hospital day 6d.     INTERVAL HPI / OVERNIGHT EVENTS:  Patient was examined and seen at bedside. This morning she is resting comfortably in bed and reports no new issues or overnight events.     ROS: Otherwise unremarkable     PAST MEDICAL & SURGICAL HISTORY  Diabetes    Congestive heart failure (CHF)    Pleural effusion due to congestive heart failure    End stage renal disease  on dialysis Mon, Wed, Fr    Uterine prolapse    Chronic indwelling Castellon catheter    History of repair of hip fracture  2019    S/P CABG (coronary artery bypass graft)  11/2019    History of thoracentesis    Chronic indwelling Castellon catheter      ALLERGIES  No Known Allergies    MEDICATIONS  STANDING MEDICATIONS  albuterol/ipratropium (CFC free) Inhaler. 1 Puff(s) Inhalation four times a day  aMIOdarone    Tablet 200 milliGRAM(s) Oral two times a day  aspirin  chewable 81 milliGRAM(s) Oral daily  atorvastatin Oral Tab/Cap - Peds 40 milliGRAM(s) Oral daily  BACItracin   Ointment 1 Application(s) Topical two times a day  cefTRIAXone   IVPB 1000 milliGRAM(s) IV Intermittent every 24 hours  chlorhexidine 0.12% Liquid 15 milliLiter(s) Oral Mucosa every 12 hours  chlorhexidine 4% Liquid 1 Application(s) Topical <User Schedule>  dexMEDEtomidine Infusion 0.2 MICROgram(s)/kG/Hr IV Continuous <Continuous>  doxercalciferol Injectable 2 MICROGram(s) IV Push <User Schedule>  epoetin mana-epbx (RETACRIT) Injectable 11708 Unit(s) IV Push <User Schedule>  fentaNYL   Infusion. 0.5 MICROgram(s)/kG/Hr IV Continuous <Continuous>  heparin   Injectable 5000 Unit(s) SubCutaneous every 8 hours  levETIRAcetam  IVPB 375 milliGRAM(s) IV Intermittent every 12 hours  levETIRAcetam  IVPB 250 milliGRAM(s) IV Intermittent <User Schedule>  mupirocin 2% Ointment 1 Application(s) Topical two times a day  norepinephrine Infusion 0.05 MICROgram(s)/kG/Min IV Continuous <Continuous>  pantoprazole  Injectable 40 milliGRAM(s) IV Push daily  senna 2 Tablet(s) Oral daily  valproic  acid Syrup 250 milliGRAM(s) Oral two times a day    PRN MEDICATIONS    VITALS:  T(F): 99  HR: 98  BP: 141/68  RR: 24  SpO2: 100%    PHYSICAL EXAM  GEN: NAD, Resting comfortably in bed  PULM: Clear to auscultation bilaterally, No wheezes  CVS: Regular rate and rhythm, S1-S2, no murmurs  ABD: Soft, non-tender, non-distended, no guarding  EXT: No edema  NEURO: A&Ox3, no focal deficits    LABS                        8.4    4.96  )-----------( 121      ( 07 Apr 2021 04:20 )             26.8     04-07    144  |  107  |  23<H>  ----------------------------<  140<H>  3.8   |  26  |  3.2<H>    Ca    8.0<L>      07 Apr 2021 04:20  Phos  4.2     04-07  Mg     1.9     04-07    TPro  5.4<L>  /  Alb  3.0<L>  /  TBili  0.3  /  DBili  x   /  AST  12  /  ALT  7   /  AlkPhos  133<H>  04-07    PT/INR - ( 07 Apr 2021 04:20 )   PT: 14.10 sec;   INR: 1.23 ratio         PTT - ( 07 Apr 2021 04:20 )  PTT:32.8 sec    ABG - ( 07 Apr 2021 09:06 )  pH, Arterial: 7.40  pH, Blood: x     /  pCO2: 43    /  pO2: 87    / HCO3: 27    / Base Excess: 1.9   /  SaO2: 96            RADIOLOGY      Assessment/Plan:    69 y/o F with a pmhx of CAD s/p CABG (Nov 2019), HFrEF (EF 20%), AS, ESRD (MWF), bladder prolapse requiring chronic Castellon, Diabetes Type 2 on insulin presents from home s/p mechanical fall. Trauma workup in ED significant for R humeral fracture / R hip fracture / non displaced odontoid fracture/ acute rib fractures/ ? T6 vertebral fracture. In the ED patient arrested, ROSC achieved after 2 minutes of CPR. EKG at the time showed RBBB with wide complex tachy. Pt subsequently intubated and sedated, started on pressors. Pt Experienced seizure-like episode during weaning trial s/p 2mg Versed.  (4/2/21) Weaning trial passed. Patient was Extubated . 02 sat began to decrease to low 80s, tachypenic and cyanotic started on bipap with no improvement. Patient again became hypotensive and tachy. Went into Afib. s/p cardioversion x3 and started on amio     # RT Humerus/Rt femur fracture/Rt Hip fracture   - Ortho following  - Pain control. Currently on Fentanyl drip   - Patient is scheduled for OR today for hip stabilization with ortho.   - Patient is NPO for procedure      #Cardiac arrest in the setting of arythmia and CHF exacerbation   - Trops .22-->.24 -->.21-->.34  - CTA negative for PE   - Lower extremity duplex negative for DVT( 4/6/21)  - EP recs : Plan for CRT-D placement FRIDAY before extubation due to C2 fracture. Will be scheduled for end of day due to Adenovirus infection.   - Patient is NPO for procedure   - On heparin subq   - F/u TSH results     # Seizure unknown etiology  - CT Head (4/1) negative   - Keppra 500mg adminstered. followed by 375mg BID with additional 250mg dose after dialysis  - Started on Depakote 250mg PO BID   - VEEG - bifrontal sharps but no seizure activity. Consistent with metabolic process.       # Aspiration Pneumonia vs Adenovirus Pneumonia   - CXR (4/5) Bilateral opacities, decreased. Stable cardiomegaly/left pleural effusion..  - ID Recs : Unasyn 3 gm iv q24h, D/c rocephin, Deep ET cultures  - Day 6 of Ceftriaxone     # Perirectal abscess  - surgery is following patient. Dr Mota   - s/p debridement over a month. No need for packing as per surgery     #Odontoid fracture   - Neurosurgery following patient   - Want to c/w collar      #ESRD  - Nephro following patient  - Last dialyzed  Tuesday (4/6)  - HD scheduled for tomorrow (4/8)    # h/o DM   - fingersticks tid  - hold home insulin  - insulin protocol if > 180    # DVT PPX: heparin subq  # GI PPX: PPI  # Fluids : D5 LR @ 50  # Diet: NPO   # Activity: bedrest  # Bowel regimen   HPI  Patient is a 70y old Female who presents with a chief complaint of right hip (07 Apr 2021 11:53)    Currently admitted to medicine with the primary diagnosis of Cardiopulmonary arrest       Today is hospital day 6d.     INTERVAL HPI / OVERNIGHT EVENTS:  Patient was examined and seen at bedside. This morning she is resting comfortably in bed and reports no new issues or overnight events.     ROS: Otherwise unremarkable     PAST MEDICAL & SURGICAL HISTORY  Diabetes    Congestive heart failure (CHF)    Pleural effusion due to congestive heart failure    End stage renal disease  on dialysis Mon, Wed, Fr    Uterine prolapse    Chronic indwelling Castellon catheter    History of repair of hip fracture  2019    S/P CABG (coronary artery bypass graft)  11/2019    History of thoracentesis    Chronic indwelling Castellon catheter      ALLERGIES  No Known Allergies    MEDICATIONS  STANDING MEDICATIONS  albuterol/ipratropium (CFC free) Inhaler. 1 Puff(s) Inhalation four times a day  aMIOdarone    Tablet 200 milliGRAM(s) Oral two times a day  aspirin  chewable 81 milliGRAM(s) Oral daily  atorvastatin Oral Tab/Cap - Peds 40 milliGRAM(s) Oral daily  BACItracin   Ointment 1 Application(s) Topical two times a day  cefTRIAXone   IVPB 1000 milliGRAM(s) IV Intermittent every 24 hours  chlorhexidine 0.12% Liquid 15 milliLiter(s) Oral Mucosa every 12 hours  chlorhexidine 4% Liquid 1 Application(s) Topical <User Schedule>  dexMEDEtomidine Infusion 0.2 MICROgram(s)/kG/Hr IV Continuous <Continuous>  doxercalciferol Injectable 2 MICROGram(s) IV Push <User Schedule>  epoetin mana-epbx (RETACRIT) Injectable 14661 Unit(s) IV Push <User Schedule>  fentaNYL   Infusion. 0.5 MICROgram(s)/kG/Hr IV Continuous <Continuous>  heparin   Injectable 5000 Unit(s) SubCutaneous every 8 hours  levETIRAcetam  IVPB 375 milliGRAM(s) IV Intermittent every 12 hours  levETIRAcetam  IVPB 250 milliGRAM(s) IV Intermittent <User Schedule>  mupirocin 2% Ointment 1 Application(s) Topical two times a day  norepinephrine Infusion 0.05 MICROgram(s)/kG/Min IV Continuous <Continuous>  pantoprazole  Injectable 40 milliGRAM(s) IV Push daily  senna 2 Tablet(s) Oral daily  valproic  acid Syrup 250 milliGRAM(s) Oral two times a day    PRN MEDICATIONS    VITALS:  T(F): 99  HR: 98  BP: 141/68  RR: 24  SpO2: 100%    PHYSICAL EXAM  GEN: NAD, Resting comfortably in bed  PULM: Clear to auscultation bilaterally, No wheezes  CVS: Regular rate and rhythm, S1-S2, no murmurs  ABD: Soft, non-tender, non-distended, no guarding  EXT: No edema  NEURO: A&Ox3, no focal deficits    LABS                        8.4    4.96  )-----------( 121      ( 07 Apr 2021 04:20 )             26.8     04-07    144  |  107  |  23<H>  ----------------------------<  140<H>  3.8   |  26  |  3.2<H>    Ca    8.0<L>      07 Apr 2021 04:20  Phos  4.2     04-07  Mg     1.9     04-07    TPro  5.4<L>  /  Alb  3.0<L>  /  TBili  0.3  /  DBili  x   /  AST  12  /  ALT  7   /  AlkPhos  133<H>  04-07    PT/INR - ( 07 Apr 2021 04:20 )   PT: 14.10 sec;   INR: 1.23 ratio         PTT - ( 07 Apr 2021 04:20 )  PTT:32.8 sec    ABG - ( 07 Apr 2021 09:06 )  pH, Arterial: 7.40  pH, Blood: x     /  pCO2: 43    /  pO2: 87    / HCO3: 27    / Base Excess: 1.9   /  SaO2: 96            RADIOLOGY      Assessment/Plan:    69 y/o F with a pmhx of CAD s/p CABG (Nov 2019), HFrEF (EF 20%), AS, ESRD (MWF), bladder prolapse requiring chronic Castellon, Diabetes Type 2 on insulin presents from home s/p mechanical fall. Trauma workup in ED significant for R humeral fracture / R hip fracture / non displaced odontoid fracture/ acute rib fractures/ ? T6 vertebral fracture. In the ED patient arrested, ROSC achieved after 2 minutes of CPR. EKG at the time showed RBBB with wide complex tachy. Pt subsequently intubated and sedated, started on pressors. Pt Experienced seizure-like episode during weaning trial s/p 2mg Versed.  (4/2/21) Weaning trial passed. Patient was Extubated . 02 sat began to decrease to low 80s, tachypenic and cyanotic started on bipap with no improvement. Patient again became hypotensive and tachy. Went into Afib. s/p cardioversion x3 and started on amio     # RT Humerus/Rt femur fracture/Rt Hip fracture   - Ortho following  - Pain control. Currently on Fentanyl drip   - Patient is scheduled for OR today for hip stabilization with ortho.   - 1u of pRBCs for procedure   - Patient is NPO for procedure      #Cardiac arrest in the setting of arythmia and CHF exacerbation   - Trops .22-->.24 -->.21-->.34  - CTA negative for PE   - Lower extremity duplex negative for DVT( 4/6/21)  - EP recs : Plan for CRT-D placement FRIDAY before extubation due to C2 fracture. Will be scheduled for end of day due to Adenovirus infection.   - Patient is NPO for procedure   - On heparin subq   - F/u TSH results     # Seizure unknown etiology  - CT Head (4/1) negative   - Keppra 500mg adminstered. followed by 375mg BID with additional 250mg dose after dialysis  - Started on Depakote 250mg PO BID   - VEEG - bifrontal sharps but no seizure activity. Consistent with metabolic process.       # Aspiration Pneumonia vs Adenovirus Pneumonia   - CXR (4/5) Bilateral opacities, decreased. Stable cardiomegaly/left pleural effusion..  - ID Recs : Unasyn 3 gm iv q24h, D/c rocephin, Deep ET cultures  - Day 6 of Ceftriaxone     # Perirectal abscess  - surgery is following patient. Dr Mota   - s/p debridement over a month. No need for packing as per surgery     #Odontoid fracture   - Neurosurgery following patient   - Want to c/w collar      #ESRD  - Nephro following patient  - Last dialyzed  Tuesday (4/6)  - HD scheduled for tomorrow (4/8)    # h/o DM   - fingersticks tid  - hold home insulin  - insulin protocol if > 180    # DVT PPX: heparin subq  # GI PPX: PPI  # Fluids : D5 LR @ 50  # Diet: NPO   # Activity: bedrest  # Bowel regimen

## 2021-04-07 NOTE — PROGRESS NOTE ADULT - SUBJECTIVE AND OBJECTIVE BOX
GUS WILBURN  70y, Female    All available historical data reviewed    OVERNIGHT EVENTS:  no fevers  Fio2 30%  on minimal dosis of levo  does not follow commands    ROS:  unable to obtain history secondary to patient's mental status and/or sedation     VITALS:  T(F): 99, Max: 99.7 (04-06-21 @ 19:00)  HR: 84  BP: 99/57  RR: 15Vital Signs Last 24 Hrs  T(C): 37.2 (07 Apr 2021 10:22), Max: 37.6 (06 Apr 2021 19:00)  T(F): 99 (07 Apr 2021 10:22), Max: 99.7 (06 Apr 2021 19:00)  HR: 84 (07 Apr 2021 10:22) (78 - 98)  BP: 99/57 (07 Apr 2021 10:22) (78/46 - 148/73)  BP(mean): 89 (07 Apr 2021 09:00) (51 - 102)  RR: 15 (07 Apr 2021 10:22) (12 - 46)  SpO2: 100% (07 Apr 2021 10:22) (96% - 100%)    TESTS & MEASUREMENTS:                        8.4    4.96  )-----------( 121      ( 07 Apr 2021 04:20 )             26.8     04-07    144  |  107  |  23<H>  ----------------------------<  140<H>  3.8   |  26  |  3.2<H>    Ca    8.0<L>      07 Apr 2021 04:20  Phos  4.2     04-07  Mg     1.9     04-07    TPro  5.4<L>  /  Alb  3.0<L>  /  TBili  0.3  /  DBili  x   /  AST  12  /  ALT  7   /  AlkPhos  133<H>  04-07    LIVER FUNCTIONS - ( 07 Apr 2021 04:20 )  Alb: 3.0 g/dL / Pro: 5.4 g/dL / ALK PHOS: 133 U/L / ALT: 7 U/L / AST: 12 U/L / GGT: x             Culture - Blood (collected 04-02-21 @ 11:52)  Source: .Blood None  Preliminary Report (04-03-21 @ 23:01):    No growth to date.    Culture - Urine (collected 04-02-21 @ 10:44)  Source: .Urine Catheterized  Final Report (04-04-21 @ 09:25):    >=3 organisms. Probable collection contamination.    Culture - Blood (collected 04-02-21 @ 05:40)  Source: .Blood None  Preliminary Report (04-03-21 @ 14:01):    No growth to date.    Culture - Blood (collected 04-01-21 @ 11:00)  Source: .Blood Blood-Peripheral  Final Report (04-06-21 @ 22:00):    No Growth Final            RADIOLOGY & ADDITIONAL TESTS:  Personal review of radiological diagnostics performed  Echo and EKG results noted when applicable.     MEDICATIONS:  albuterol/ipratropium (CFC free) Inhaler. 1 Puff(s) Inhalation four times a day  aMIOdarone    Tablet 200 milliGRAM(s) Oral two times a day  aspirin  chewable 81 milliGRAM(s) Oral daily  atorvastatin Oral Tab/Cap - Peds 40 milliGRAM(s) Oral daily  BACItracin   Ointment 1 Application(s) Topical two times a day  cefTRIAXone   IVPB 1000 milliGRAM(s) IV Intermittent every 24 hours  chlorhexidine 0.12% Liquid 15 milliLiter(s) Oral Mucosa every 12 hours  chlorhexidine 4% Liquid 1 Application(s) Topical <User Schedule>  dexMEDEtomidine Infusion 0.2 MICROgram(s)/kG/Hr IV Continuous <Continuous>  dextrose 5% + sodium chloride 0.9%. 1000 milliLiter(s) IV Continuous <Continuous>  doxercalciferol Injectable 2 MICROGram(s) IV Push <User Schedule>  epoetin mana-epbx (RETACRIT) Injectable 75851 Unit(s) IV Push <User Schedule>  fentaNYL   Infusion. 0.5 MICROgram(s)/kG/Hr IV Continuous <Continuous>  heparin   Injectable 5000 Unit(s) SubCutaneous every 8 hours  levETIRAcetam  IVPB 375 milliGRAM(s) IV Intermittent every 12 hours  levETIRAcetam  IVPB 250 milliGRAM(s) IV Intermittent <User Schedule>  mupirocin 2% Ointment 1 Application(s) Topical two times a day  norepinephrine Infusion 0.05 MICROgram(s)/kG/Min IV Continuous <Continuous>  pantoprazole  Injectable 40 milliGRAM(s) IV Push daily  senna 2 Tablet(s) Oral daily  valproic  acid Syrup 250 milliGRAM(s) Oral two times a day      ANTIBIOTICS:  cefTRIAXone   IVPB 1000 milliGRAM(s) IV Intermittent every 24 hours

## 2021-04-07 NOTE — PROGRESS NOTE ADULT - ASSESSMENT
69 yo F with PMH of CAD s/p CABG (November 2019), HFrEF (EF 20%), AS, ESRD (MWF), bladder prolapse requiring chronic Castellon, Diabetes Type 2 on insulin.    Acute on chronic systolic HF/ ESRD on HD/ Respiratory distress / possible ventricular arrhythmia     Continue MV - management per PCCM team   Plan for CRT-D - timing per EP  Wean norepinephrine as tolerated by BP     Continue fluid removal via HD - nephro recs appreciated   Continue to hold HF meds  Ortho follow up - plan for ORIF possibly today    Discussed with family member at bedside    Will continue to follow

## 2021-04-07 NOTE — PROGRESS NOTE ADULT - ASSESSMENT
69 yo F with PMH of CAD s/p CABG (November 2019), HFrEF (EF 20%), AS, ESRD (MWF), bladder prolapse requiring chronic Castellon, Diabetes Type 2 on insulin presents from home s/p mechanical fall with Multiple traumas (R humeral fracture / R hip fracture / non displaced odontoid fracture/ acute rib fractures/ ? T6 vertebral fracture). Found to be pulseless in trauma bay (not on monitor) with ROSC after 2 min. Seizurelike activities when sedation held, confirmed on vEEG. Extubated 4/2, followed by arrhythmia AF vs VT was reintubated and cardioverted.     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 ICD will not effect pts risk.   - Will plan for CRT-D, preferably before pt is extubated to minimize neck movement  - Cont Amio to PO 200mg q12h   - Metoprolol on hold  - Wean levo as tolerated  - Anemia, work up for any source of bleeding  - Pt not a candidate for AC d/t hx of GIB  - Isolation precautions dc'd  - Neurology clearance prior to OR

## 2021-04-07 NOTE — PROGRESS NOTE ADULT - ATTENDING COMMENTS
- Ortho OR today. ICD implant won't change teddy-operative CV risk.  - Unable to implant CRT-D due to unavoidable logistic issues  - Prefer to implant while intubated. however, can extubate patient if clinically ready  - ischemic w-up as per cardio  - continue amiodarone  - Metoprolol when off pressors  - HF team f-up  D/w primary team, critical care attending.

## 2021-04-07 NOTE — PROGRESS NOTE ADULT - ASSESSMENT
1. Cardiac Arrest: secondary to VFIB in nature, continue supportive care, monitor for now. Monitor for now.    2. Acute Hypoxic Respiratory Failure: secondary to septic/ cardiogenic shock, PNA, continue on lung protective mechanical ventilation for now, will wean FIO2 for sats greater then 92%,  tx nebs, pulmonary toilet, continue supportive care monitor for now. Check SAT and SBT.    3. Mixed Cardiogenic/Septic Shock: secondary to PNA, will continue empiric abx as per ID, on levophed gtt will wean for maps greater then 65, continue monitor for now.    4. Acute Biventricular CHF EF 38% in setting of Severe Aortic stenosis and Mod-Severe MR: continue supportive care will need eventual BiVICD by EP, monitor for now.    5. PNA: continue empiric abx as per ID, continue pulmonary toilet tx nebs, monitor for now, f/u Cx's.    6. VFIB: tx amio, most likley ischemic in nature. F/u Cardiology recs.    7. B/l Rib Fractures: from fall continue pain control.    8. Right Humeral Fracture: continue supportive care as per Ortho, monitor for now.    9. Right Intertrochanteric Fracture: continue supportive care as per Ortho, monitor for now. awaiting surgery.    10. Odontoid Fracture: continue nec collar as per Neurology f/u Recs.    11. Seizures: tx AED's as per Neurology monitor for now.    12. Anemia: secondary to hip fracture and AOC transfuse as needed for less then 7, continue supportive care, monitor for now.    13. Sacral Decubitus/ Abscess s/p I/D, f/u surgery recs.    14. CAD s/p CABG: tx ASA, continue supportive care, monitor for now.    15. Bladder Prolapse with Chronic Castellon: continue Castellon monitor for now.    16. DM: tx inuslin sliding scale, monitor BSG's, continue supportive care.    17. ESRD on HD: continue renal replacement therapy as per nephrology, monitor for now.    18. DVT/ GI px: tx PPI/ hep subcut. lower ext US neg for DVT.    19. NUT: tx IVF's resume TF's. after OR        Critical Care Time not including procedures 38 mins

## 2021-04-07 NOTE — PROGRESS NOTE ADULT - SUBJECTIVE AND OBJECTIVE BOX
DIAGNOSIS:   HOSPITAL DAY #:    STATUS POST:    POST OPERATIVE DAY #:     Vital Signs Last 24 Hrs  T(C): 36.9 (07 Apr 2021 20:00), Max: 37.4 (07 Apr 2021 00:00)  T(F): 98.4 (07 Apr 2021 20:00), Max: 99.3 (07 Apr 2021 00:00)  HR: 80 (07 Apr 2021 18:00) (78 - 98)  BP: 124/70 (07 Apr 2021 20:00) (78/46 - 148/73)  BP(mean): 100 (07 Apr 2021 20:00) (51 - 102)  RR: 12 (07 Apr 2021 20:00) (12 - 63)  SpO2: 100% (07 Apr 2021 20:00) (96% - 100%)    SUBJECTIVE: Pt seen    Pain: YES  [ ]   NO [ ]   Nausea: [ ] YES [ ] NO           Vomiting: [ ] YES [ ] NO  Flatus: [ ] YES [ ] NO             Bowel Movement: [ ] YES [ ] NO     Void: [ ]YES [ ]No      ALEE DRAINAGE: SIGNIFICANT [ ]   NOT SIGNIFICANT [ ]   NOT APPLICABLE [ ]  YES [ ] NO    General Appearance: Appears well, NAD  Neck: Supple no mass intubated   Chest: Equal expansion bilaterally, equal breath sounds  CV: Pulse regular presently  Abdomen: Soft [x ] YES [ ]NO  DISTENDED [ ] YES [ ] NO TENDERNESS [ ]YES [ ]NO open wound of buttock no gross infection  INCISIONS: HEALING WELL [ ] YES  [ ] NO ERYTHEMA [ ] YES [ ] NO DRAINAGE [ ] YES  [ ] NO  Extremities: Grossly symmetric, CALF TENDERNESS [ ] YES  [ ]     LABS:                        8.4    4.96  )-----------( 121      ( 07 Apr 2021 04:20 )             26.8     04-07    144  |  107  |  23<H>  ----------------------------<  140<H>  3.8   |  26  |  3.2<H>    Ca    8.0<L>      07 Apr 2021 04:20  Phos  4.2     04-07  Mg     1.9     04-07    TPro  5.4<L>  /  Alb  3.0<L>  /  TBili  0.3  /  DBili  x   /  AST  12  /  ALT  7   /  AlkPhos  133<H>  04-07    PT/INR - ( 07 Apr 2021 04:20 )   PT: 14.10 sec;   INR: 1.23 ratio         PTT - ( 07 Apr 2021 04:20 )  PTT:32.8 sec        ASSESSMENT: medical follow up noted    GOOD POST OP COURSE [ ]  YES  [ ] NO  CONDITION IMPROVING  []  YES  [ ]  NO          PLAN:    CONTINUE PRESENT MANAGEMENT  [ ] YES  [ ] NO

## 2021-04-08 NOTE — PROGRESS NOTE ADULT - ASSESSMENT
1. Odontoid fracture. Neurosurgery following.  2. R hip / R humerus fracture. Ortho following. OR today.  3. ESRD on HD TTS. HD today: 3 hours, opti 160 dialyzer, 3K bath, 2L UF.  4. Anemia. EPO with HD.  5. Secondary hyperparathyroidism. Hectorol with HD.

## 2021-04-08 NOTE — PROGRESS NOTE ADULT - ASSESSMENT
71 yo F with PMH of CAD s/p CABG (November 2019), HFrEF (EF 20%), AS, ESRD (MWF), bladder prolapse requiring chronic Castellon, Diabetes Type 2 on insulin.    Acute on chronic systolic HF/ ESRD on HD/ Respiratory distress / possible ventricular arrhythmia     Continue MV - management per PCCM team   Plan for CRT-D - timing per EP  Wean norepinephrine as tolerated by BP     Continue fluid removal via HD - nephro recs appreciated   Continue to hold HF meds  Ortho follow up - plan for ORIF possibly today    Discussed with family member at bedside    Will continue to follow    71 yo F with PMH of CAD s/p CABG (November 2019), HFrEF (EF 20%), AS, ESRD (MWF), bladder prolapse requiring chronic Castellon, Diabetes Type 2 on insulin.    Acute on chronic systolic HF/ ESRD on HD/ Respiratory distress / possible ventricular arrhythmia     Continue MV - management per PCCM team   Plan for CRT-D - timing per EP  Continue pressor support - wean as tolerated by BP     Continue fluid removal via HD    Continue to hold HF meds  Ortho follow up - plan for ORIF possibly today   Will continue to follow

## 2021-04-08 NOTE — PROGRESS NOTE ADULT - ASSESSMENT
Orthopedic attending  Patient seen and examined.  PMHx, Psurg Hx, Medications and Allergies reviewed.  X-rays and CT reviewed.  Agree with findings, assessment and plan.  Impression Non-displaced right inteer trochanteric hip fracture and displaced right proximal humerus fractures.  Concurrent medical issues now stablized.  Spoke with Oni who has asked that hip fracture be fixed before any further cardiac intervention.  Dr. Dunn from anesthesia by report has agreed that surgery can be done today.  Will plan on [id number] patient intubated post op for delayed extubation in CCU.  Right proximal humerus fracture will be addressed subsequently if at all; will discuss treatment options with patient after extubated.  Recommend proceeding with surgery later today.

## 2021-04-08 NOTE — PROGRESS NOTE ADULT - SUBJECTIVE AND OBJECTIVE BOX
Patient is a 70y old  Female who presents with a chief complaint of hip fracture (08 Apr 2021 11:05)        Pt remains in ICU on MV        PHYSICAL EXAM    ICU Vital Signs Last 24 Hrs  T(C): 37.1 (08 Apr 2021 12:00), Max: 37.2 (07 Apr 2021 16:00)  T(F): 98.8 (08 Apr 2021 12:00), Max: 99 (08 Apr 2021 08:00)  HR: 98 (08 Apr 2021 14:00) (80 - 132)  BP: 104/59 (08 Apr 2021 14:00) (73/49 - 157/88)  BP(mean): 88 (08 Apr 2021 14:00) (57 - 132)  ABP: --  ABP(mean): --  RR: 20 (08 Apr 2021 14:00) (8 - 63)  SpO2: 100% (08 Apr 2021 14:00) (95% - 100%)      CONSTITUTIONAL:  Pt remians on MV in ICU    ENT:   pos Et tube    EYES:   Pupils equal,   Round and reactive to light.    CARDIAC:   Normal rate,   Regular rhythm.    No edema      Vascular:  Normal systolic impulse  No Carotid bruits    RESPIRATORY:   No wheezing  Bilateral BS  Normal chest expansion  Not tachypneic,  No use of accessory muscles    GASTROINTESTINAL:  Abdomen soft,   Non-tender,   No guarding,   + BS    MUSCULOSKELETAL:   Range of motion is not limited,  No clubbing, cyanosis    NEUROLOGICAL:   Alert and oriented   No motor  deficits.    SKIN:   Skin normal color for race,   Warm and dry and intact.   No evidence of rash.    PSYCHIATRIC:   Normal mood and affect.   No apparent risk to self or others.    HEMATOLOGICAL:  No cervical  lymphadenopathy.  no inguinal lymphadenopathy      04-07-21 @ 07:01  -  04-08-21 @ 07:00  --------------------------------------------------------  IN:    Dexmedetomidine: 348 mL    dextrose 5% + sodium chloride 0.9%: 50 mL    Enteral Tube Flush: 100 mL    FentaNYL: 23 mL    FentaNYL: 213.2 mL    IV PiggyBack: 200 mL    Norepinephrine: 51 mL  Total IN: 985.2 mL    OUT:    Indwelling Catheter - Urethral (mL): 215 mL  Total OUT: 215 mL    Total NET: 770.2 mL      04-08-21 @ 07:01  -  04-08-21 @ 15:28  --------------------------------------------------------  IN:    Dexmedetomidine: 101.5 mL    FentaNYL: 116 mL    Norepinephrine: 37 mL  Total IN: 254.5 mL    OUT:    Other (mL): 1600 mL  Total OUT: 1600 mL    Total NET: -1345.5 mL          LABS:                            8.7    5.79  )-----------( 155      ( 08 Apr 2021 04:50 )             28.6                                               04-08    143  |  106  |  31<H>  ----------------------------<  105<H>  4.3   |  24  |  3.7<H>    Ca    8.6      08 Apr 2021 04:50  Phos  4.2     04-07  Mg     1.9     04-08    TPro  5.6<L>  /  Alb  2.9<L>  /  TBili  0.3  /  DBili  x   /  AST  12  /  ALT  6   /  AlkPhos  146<H>  04-08      PT/INR - ( 08 Apr 2021 04:50 )   PT: 15.10 sec;   INR: 1.31 ratio         PTT - ( 08 Apr 2021 04:50 )  PTT:31.8 sec                                                                                     LIVER FUNCTIONS - ( 08 Apr 2021 04:50 )  Alb: 2.9 g/dL / Pro: 5.6 g/dL / ALK PHOS: 146 U/L / ALT: 6 U/L / AST: 12 U/L / GGT: x                                                                                               Mode: AC/ CMV (Assist Control/ Continuous Mandatory Ventilation)  RR (machine): 12  TV (machine): 350  FiO2: 30  PEEP: 5  ITime: 1  MAP: 8  PIP: 15                                      ABG - ( 08 Apr 2021 04:25 )  pH, Arterial: 7.37  pH, Blood: x     /  pCO2: 44    /  pO2: 94    / HCO3: 25    / Base Excess: -0.4  /  SaO2: 97                  MEDICATIONS  (STANDING):  albuterol/ipratropium (CFC free) Inhaler. 1 Puff(s) Inhalation four times a day  aMIOdarone    Tablet 200 milliGRAM(s) Oral two times a day  aspirin  chewable 81 milliGRAM(s) Oral daily  atorvastatin Oral Tab/Cap - Peds 40 milliGRAM(s) Oral daily  BACItracin   Ointment 1 Application(s) Topical two times a day  chlorhexidine 0.12% Liquid 15 milliLiter(s) Oral Mucosa every 12 hours  chlorhexidine 4% Liquid 1 Application(s) Topical <User Schedule>  dexMEDEtomidine Infusion 0.2 MICROgram(s)/kG/Hr (3.3 mL/Hr) IV Continuous <Continuous>  doxercalciferol Injectable 2 MICROGram(s) IV Push <User Schedule>  epoetin mana-epbx (RETACRIT) Injectable 10158 Unit(s) IV Push <User Schedule>  fentaNYL   Infusion. 0.5 MICROgram(s)/kG/Hr (2.9 mL/Hr) IV Continuous <Continuous>  heparin   Injectable 5000 Unit(s) SubCutaneous every 8 hours  levETIRAcetam  IVPB 375 milliGRAM(s) IV Intermittent every 12 hours  levETIRAcetam  IVPB 250 milliGRAM(s) IV Intermittent <User Schedule>  norepinephrine Infusion 0.05 MICROgram(s)/kG/Min (5.44 mL/Hr) IV Continuous <Continuous>  pantoprazole  Injectable 40 milliGRAM(s) IV Push daily  polyethylene glycol 3350 17 Gram(s) Oral at bedtime  valproic  acid Syrup 250 milliGRAM(s) Oral two times a day    MEDICATIONS  (PRN):      New X-rays reviewed:                                                                                  ECHO    CXR interpreted by me:

## 2021-04-08 NOTE — PROGRESS NOTE ADULT - ATTENDING COMMENTS
case discussed with anesthesiology and orthopedics  surgey will be performed today  pt is stable will attempt weaning tmw

## 2021-04-08 NOTE — PROGRESS NOTE ADULT - SUBJECTIVE AND OBJECTIVE BOX
[facility] FOLLOW UP NOTE  --------------------------------------------------------------------------------  24 hour events/subjective: Patient examined during HD. Intubated.    PAST HISTORY  --------------------------------------------------------------------------------  No significant changes to PMH, PSH, FHx, SHx, unless otherwise noted    ALLERGIES & MEDICATIONS  --------------------------------------------------------------------------------  Allergies    No Known Allergies    Intolerances      Standing Inpatient Medications  albuterol/ipratropium (CFC free) Inhaler. 1 Puff(s) Inhalation four times a day  aMIOdarone    Tablet 200 milliGRAM(s) Oral two times a day  aspirin  chewable 81 milliGRAM(s) Oral daily  atorvastatin Oral Tab/Cap - Peds 40 milliGRAM(s) Oral daily  BACItracin   Ointment 1 Application(s) Topical two times a day  cefTRIAXone   IVPB 1000 milliGRAM(s) IV Intermittent every 24 hours  chlorhexidine 0.12% Liquid 15 milliLiter(s) Oral Mucosa every 12 hours  chlorhexidine 4% Liquid 1 Application(s) Topical <User Schedule>  dexMEDEtomidine Infusion 0.2 MICROgram(s)/kG/Hr IV Continuous <Continuous>  doxercalciferol Injectable 2 MICROGram(s) IV Push <User Schedule>  epoetin mana-epbx (RETACRIT) Injectable 93236 Unit(s) IV Push <User Schedule>  fentaNYL   Infusion. 0.5 MICROgram(s)/kG/Hr IV Continuous <Continuous>  heparin   Injectable 5000 Unit(s) SubCutaneous every 8 hours  levETIRAcetam  IVPB 375 milliGRAM(s) IV Intermittent every 12 hours  levETIRAcetam  IVPB 250 milliGRAM(s) IV Intermittent <User Schedule>  norepinephrine Infusion 0.05 MICROgram(s)/kG/Min IV Continuous <Continuous>  pantoprazole  Injectable 40 milliGRAM(s) IV Push daily  senna 2 Tablet(s) Oral daily  valproic  acid Syrup 250 milliGRAM(s) Oral two times a day    PRN Inpatient Medications      VITALS/PHYSICAL EXAM  --------------------------------------------------------------------------------  T(C): 37.2 (04-08-21 @ 08:00), Max: 37.2 (04-07-21 @ 16:00)  HR: 132 (04-08-21 @ 09:00) (80 - 132)  BP: 157/88 (04-08-21 @ 09:00) (73/49 - 157/88)  RR: 21 (04-08-21 @ 09:00) (8 - 63)  SpO2: 100% (04-08-21 @ 09:00) (95% - 100%)  Height (cm): 157.5 (04-07-21 @ 16:58)  Weight (kg): 58 (04-07-21 @ 16:58)  BMI (kg/m2): 23.4 (04-07-21 @ 16:58)  BSA (m2): 1.58 (04-07-21 @ 16:58)      04-07-21 @ 07:01  -  04-08-21 @ 07:00  --------------------------------------------------------  IN: 985.2 mL / OUT: 215 mL / NET: 770.2 mL    04-08-21 @ 07:01  -  04-08-21 @ 10:29  --------------------------------------------------------  IN: 69 mL / OUT: 0 mL / NET: 69 mL    Physical Exam:  	Gen: Intubated  	Pulm:  B/L rales  	CV: RRR, S1S2  	Abd: +BS, soft, nnondistended  	: No suprapubic tenderness  	LE: Warm, no edema  	Vascular access: TDC    LABS/STUDIES  --------------------------------------------------------------------------------              8.7    5.79  >-----------<  155      [04-08-21 @ 04:50]              28.6     143  |  106  |  31  ----------------------------<  105      [04-08-21 @ 04:50]  4.3   |  24  |  3.7        Ca     8.6     [04-08-21 @ 04:50]      Mg     1.9     [04-08-21 @ 04:50]      Phos  4.2     [04-07-21 @ 04:20]    TPro  5.6  /  Alb  2.9  /  TBili  0.3  /  DBili  x   /  AST  12  /  ALT  6   /  AlkPhos  146  [04-08-21 @ 04:50]    PT/INR: PT 15.10, INR 1.31       [04-08-21 @ 04:50]  PTT: 31.8       [04-08-21 @ 04:50]      Creatinine Trend:  SCr 3.7 [04-08 @ 04:50]  SCr 3.2 [04-07 @ 04:20]  SCr 4.6 [04-06 @ 08:19]  SCr 3.8 [04-05 @ 04:30]  SCr 3.4 [04-04 @ 04:30]    Urinalysis - [04-02-21 @ 10:44]      Color Yellow / Appearance Turbid / SG 1.027 / pH 7.0      Gluc Negative / Ketone Negative  / Bili Negative / Urobili <2 mg/dL       Blood Moderate / Protein 100 mg/dL / Leuk Est Large / Nitrite Negative      RBC 14 / WBC >720 / Hyaline 16 / Gran  / Sq Epi  / Non Sq Epi 3 / Bacteria Moderate      Iron 55, TIBC 167, %sat 33      [05-27-20 @ 13:22]  Ferritin 636      [05-27-20 @ 13:22]  HbA1c 5.7      [11-09-19 @ 01:20]  TSH 1.71      [04-06-21 @ 04:40]

## 2021-04-08 NOTE — PROGRESS NOTE ADULT - ASSESSMENT
1. Cardiac Arrest: secondary to VFIB in nature, continue supportive care, monitor for now. Monitor for now.    2. Acute Hypoxic Respiratory Failure: secondary to septic/ cardiogenic shock, PNA, continue on lung protective mechanical ventilation for now, will wean FIO2 for sats greater then 92%,  tx nebs, pulmonary toilet, continue supportive care monitor for now. Check SAT and SBT.    3. Mixed Cardiogenic/Septic Shock: secondary to PNA, will continue empiric abx as per ID, on levophed gtt will wean for maps greater then 65, continue monitor for now.    4. Acute Biventricular CHF EF 38% in setting of Severe Aortic stenosis and Mod-Severe MR: continue supportive care will need eventual BiVICD by EP, monitor for now.    5. PNA: s/p course of abx as per ID, continue pulmonary toilet tx nebs, monitor for now,    6. VFIB: tx amio, most likley ischemic in nature. F/u Cardiology recs.    7. B/l Rib Fractures: from fall continue pain control.    8. Right Humeral Fracture: continue supportive care as per Ortho, monitor for now.    9. Right Intertrochanteric Fracture: continue supportive care as per Ortho, monitor for now. awaiting surgery today.    10. Odontoid Fracture: continue nec collar as per Neurology f/u Recs.    11. Seizures: tx AED's as per Neurology monitor for now.    12. Anemia: secondary to hip fracture and AOC transfuse as needed for less then 7, continue supportive care, monitor for now.    13. Sacral Decubitus/ Abscess s/p I/D, f/u surgery recs.    14. CAD s/p CABG: tx ASA, continue supportive care, monitor for now.    15. Bladder Prolapse with Chronic Castellon: continue Castellon monitor for now.    16. DM: tx insulin sliding scale, monitor BSG's, continue supportive care.    17. ESRD on HD: continue renal replacement therapy as per nephrology, monitor for now.    18. DVT/ GI px: tx PPI/ hep subcut. lower ext US neg for DVT.    19. NUT: tx IVF's resume TF's. after OR        Critical Care Time not including procedures 34 mins

## 2021-04-08 NOTE — PROGRESS NOTE ADULT - SUBJECTIVE AND OBJECTIVE BOX
Date of Admission: 4/1/21    Interval history:   -Patient s/p HD today  -Patient remains intubated and sedated on MV      HISTORY OF PRESENT ILLNESS:     69 yo F with PMH of CAD s/p CABG (November 2019), HFrEF (EF 20%), AS, ESRD (MWF), bladder prolapse requiring chronic Castellon, Diabetes Type 2 on insulin presents from home s/p mechanical fall. When the history was taken in the emergency department, she The denied any dizziness/ palpitations/ aura/vertigo. She reported non-radiating R shoulder pain and non-radiating R hip pain on arrival. In the emergency department, while the patient was receiving trauma work up she was found pulseless by a PCA in the room, had a cardiac arrest. ROSC was achieved after 2 minutes of CPR (1 epinephrine was given and 1 calcium chloride iv). Patient was intubated and sedated. EKG showed RBBB with wide complex tachycardia.   Per chart the patient had her last hemodialysis yesterday and has been relatively non compliant with her medications and medical care at home.   Of note in a previous call to the heart failure team, patient reported falling asleep for few seconds while doing the dishes couple of times.      PAST MEDICAL & SURGICAL HISTORY:    Diabetes  Congestive heart failure (CHF)  Pleural effusion due to congestive heart failure  End stage renal disease on dialysis Mon, Wed, Fr  Uterine prolapse  Chronic indwelling Castellon catheter  History of repair of hip fracture 2019  S/P CABG (coronary artery bypass graft) 11/2019  History of thoracentesis  Chronic indwelling Castellon catheter      FAMILY HISTORY:    Mother: MI and stomach cancer    SOCIAL HISTORY:      Former smoker      Allergies    No Known Allergies    Intolerances      PHYSICAL EXAM:    General Appearance: well appearing, normal for age and gender. 	  Neck: normal JVP, no bruit.   Cardiovascular: regular rate and rhythm S1 S2, No JVD, No murmurs, No edema  Respiratory: Lungs clear to auscultation	  Psychiatry: sedated but opens eyes   Gastrointestinal:  Soft, Non-tender  Skin/Integumen: No rashes, No ecchymoses, No cyanosis	  Neurologic: Non-focal, opens eyes and understands when spoken to  Musculoskeletal/ extremities: Normal range of motion, No clubbing, cyanosis or edema  Vascular: Peripheral pulses palpable 2+ bilaterally  	    PREVIOUS DIAGNOSTIC TESTING:      TTE 5/17/21  Summary:   1. Left ventricular ejection fraction, by visual estimation, is 20 to 25%.   2. Severely decreased global left ventricular systolic function.   3. The mitral valve leaflets are tethered due to reduced systolic function and elevated LVDP.   4. Moderate mitral annular calcification.   5. Moderate-to-severe mitral regurgitation.   6. Peak transaortic gradient equals 25.2 mmHg, mean transaortic gradient equals 8.8 mmHg, the calculated aortic valve area equals 1.07 cm² by the continuity equation consistent with moderate aortic stenosis (possible LFLG).   7. Estimated JENNIFER = 1.24 cm2 by planimetry.   8. Trivial aortic regurgitation.   9. Moderate tricuspid regurgitation.  10. Estimated pulmonary artery systolic pressure is 44.5 mmHg assuming a right atrial pressure of 8 mmHg, which is consistent with mild pulmonary hypertension.  11. LA volume Index is 71.0 ml/m² ml/m2.  12. Bilateral pleural effusions.      Home Medications:  aspirin 81 mg oral tablet, chewable: 1 tab(s) orally once a day (01 Apr 2021 11:06)  atorvastatin 40 mg oral tablet: 1 tab(s) orally once a day (01 Apr 2021 11:06)  INSULIN LISPRO KWIKPEN  100 UNIT/ML SOPN:  (01 Apr 2021 11:06)  METOLAZONE  5 MG TABS: 1  orally once a day (01 Apr 2021 11:06)  METOPROLOL SUCCINATE ER  25 MG TB24: 1  orally 2 times a day (01 Apr 2021 11:06)  SERTRALINE HCL  50 MG TABS:  (01 Apr 2021 11:06)  SEVELAMER CARBONATE 800MG TABLETS: TK 2 TS PO TID WITH MEALS (01 Apr 2021 11:06)  TORSEMIDE 20MG TABLETS: TK 3 TS PO BID (01 Apr 2021 11:06)  VITAMIN D2 [id number] (ERGO) CAP RX: TK 1 C PO WEEKLY (01 Apr 2021 11:06)

## 2021-04-08 NOTE — CHART NOTE - NSCHARTNOTEFT_GEN_A_CORE
RIGHT HIP ORIF CANCELLED BY ANESTHESIA AS PATIENT WENT INTO A-FIB PRIOR TO PROCEDURE START. WILL REQUIRE CARDIAC CLEARANCE PRIOR TO PROCEDURE. FAMILY MADE AWARE - LANCE PANDAHANK

## 2021-04-08 NOTE — PROGRESS NOTE ADULT - SUBJECTIVE AND OBJECTIVE BOX
HPI  Patient is a 70y old Female who presents with a chief complaint of hip fracture (08 Apr 2021 11:05)    Currently admitted to medicine with the primary diagnosis of Cardiopulmonary arrest       Today is hospital day 7d.     INTERVAL HPI / OVERNIGHT EVENTS:  Patient was examined and seen at bedside. This morning she is resting comfortably in bed and reports no new issues or overnight events.     ROS: Otherwise unremarkable     PAST MEDICAL & SURGICAL HISTORY  Diabetes    Congestive heart failure (CHF)    Pleural effusion due to congestive heart failure    End stage renal disease  on dialysis Mon, Wed, Fr    Uterine prolapse    Chronic indwelling Castellon catheter    History of repair of hip fracture  2019    S/P CABG (coronary artery bypass graft)  11/2019    History of thoracentesis    Chronic indwelling Castellon catheter      ALLERGIES  No Known Allergies    MEDICATIONS  STANDING MEDICATIONS  albuterol/ipratropium (CFC free) Inhaler. 1 Puff(s) Inhalation four times a day  aMIOdarone    Tablet 200 milliGRAM(s) Oral two times a day  aspirin  chewable 81 milliGRAM(s) Oral daily  atorvastatin Oral Tab/Cap - Peds 40 milliGRAM(s) Oral daily  BACItracin   Ointment 1 Application(s) Topical two times a day  chlorhexidine 0.12% Liquid 15 milliLiter(s) Oral Mucosa every 12 hours  chlorhexidine 4% Liquid 1 Application(s) Topical <User Schedule>  dexMEDEtomidine Infusion 0.2 MICROgram(s)/kG/Hr IV Continuous <Continuous>  doxercalciferol Injectable 2 MICROGram(s) IV Push <User Schedule>  epoetin mana-epbx (RETACRIT) Injectable 72637 Unit(s) IV Push <User Schedule>  fentaNYL   Infusion. 0.5 MICROgram(s)/kG/Hr IV Continuous <Continuous>  heparin   Injectable 5000 Unit(s) SubCutaneous every 8 hours  levETIRAcetam  IVPB 375 milliGRAM(s) IV Intermittent every 12 hours  levETIRAcetam  IVPB 250 milliGRAM(s) IV Intermittent <User Schedule>  norepinephrine Infusion 0.05 MICROgram(s)/kG/Min IV Continuous <Continuous>  pantoprazole  Injectable 40 milliGRAM(s) IV Push daily  senna 2 Tablet(s) Oral daily  valproic  acid Syrup 250 milliGRAM(s) Oral two times a day    PRN MEDICATIONS    VITALS:  T(F): 98.8  HR: 100  BP: 107/77  RR: 18  SpO2: 100%    PHYSICAL EXAM  GEN: NAD, Resting comfortably in bed  PULM: Clear to auscultation bilaterally, No wheezes  CVS: Regular rate and rhythm, S1-S2, no murmurs  ABD: Soft, non-tender, non-distended, no guarding  EXT: No edema  NEURO: A&Ox3, no focal deficits    LABS                        8.7    5.79  )-----------( 155      ( 08 Apr 2021 04:50 )             28.6     04-08    143  |  106  |  31<H>  ----------------------------<  105<H>  4.3   |  24  |  3.7<H>    Ca    8.6      08 Apr 2021 04:50  Phos  4.2     04-07  Mg     1.9     04-08    TPro  5.6<L>  /  Alb  2.9<L>  /  TBili  0.3  /  DBili  x   /  AST  12  /  ALT  6   /  AlkPhos  146<H>  04-08    PT/INR - ( 08 Apr 2021 04:50 )   PT: 15.10 sec;   INR: 1.31 ratio         PTT - ( 08 Apr 2021 04:50 )  PTT:31.8 sec    ABG - ( 08 Apr 2021 04:25 )  pH, Arterial: 7.37  pH, Blood: x     /  pCO2: 44    /  pO2: 94    / HCO3: 25    / Base Excess: -0.4  /  SaO2: 97                        RADIOLOGY      Assessment/Plan:    71 y/o F with a pmhx of CAD s/p CABG (Nov 2019), HFrEF (EF 20%), AS, ESRD (MWF), bladder prolapse requiring chronic Castellon, Diabetes Type 2 on insulin presents from home s/p mechanical fall. Trauma workup in ED significant for R humeral fracture / R hip fracture / non displaced odontoid fracture/ acute rib fractures/ ? T6 vertebral fracture. In the ED patient arrested, ROSC achieved after 2 minutes of CPR. EKG at the time showed RBBB with wide complex tachy. Pt subsequently intubated and sedated, started on pressors. Pt Experienced seizure-like episode during weaning trial s/p 2mg Versed.  (4/2/21) Weaning trial passed. Patient was Extubated . 02 sat began to decrease to low 80s, tachypenic and cyanotic started on bipap with no improvement. Patient again became hypotensive and tachy. Went into Afib. s/p cardioversion x3 and started on amio     # RT Humerus/Rt femur fracture/Rt Hip fracture   - Ortho following  - Pain control. Currently on Fentanyl drip   - Patient is scheduled for OR  TODAY (4/8) for hip stabilization with ortho.   - 1u of pRBCs for procedure   - Patient is NPO for procedure      #Cardiac arrest in the setting of arythmia and CHF exacerbation   - Trops .22-->.24 -->.21-->.34  - CTA negative for PE   - Lower extremity duplex negative for DVT( 4/6/21)  - EP recs : Plan for CRT-D placement FRIDAY before extubation due to C2 fracture. Will be scheduled for end of day due to Adenovirus infection.   - Patient is NPO for procedure   - On heparin subq   - F/u TSH results   - weaning Norepinephrine     # Seizure unknown etiology  - CT Head (4/1) negative   - Keppra 500mg adminstered. followed by 375mg BID with additional 250mg dose after dialysis  - Started on Depakote 250mg PO BID   - VEEG - bifrontal sharps but no seizure activity. Consistent with metabolic process.     # Aspiration Pneumonia vs Adenovirus Pneumonia   - CXR (4/5) Bilateral opacities, decreased. Stable cardiomegaly/left pleural effusion..  - ID Recs : Unasyn 3 gm iv q24h, D/c rocephin, Deep ET cultures  - s/p Completion of ceftriaxone     # Perirectal abscess  - surgery is following patient. Dr Mota   - s/p debridement over a month. No need for packing as per surgery     #Odontoid fracture   - Neurosurgery following patient   - Want to c/w collar      #ESRD  - Nephro following patient  - Patient s/p HD today 4/8    # h/o DM   - fingersticks tid  - hold home insulin  - insulin protocol if > 180    # DVT PPX: heparin subq  # GI PPX: PPI  # Fluids : D5 LR @ 50  # Diet: NPO   # Activity: bedrest  # Bowel regimen

## 2021-04-08 NOTE — PROGRESS NOTE ADULT - SUBJECTIVE AND OBJECTIVE BOX
DIAGNOSIS:   HOSPITAL DAY #:    STATUS POST:    POST OPERATIVE DAY #:     Vital Signs Last 24 Hrs  T(C): 37.1 (08 Apr 2021 12:00), Max: 37.2 (08 Apr 2021 08:00)  T(F): 98.8 (08 Apr 2021 12:00), Max: 99 (08 Apr 2021 08:00)  HR: 96 (08 Apr 2021 15:00) (80 - 132)  BP: 110/60 (08 Apr 2021 15:00) (73/49 - 157/88)  BP(mean): 89 (08 Apr 2021 15:00) (57 - 132)  RR: 12 (08 Apr 2021 15:00) (8 - 21)  SpO2: 100% (08 Apr 2021 15:00) (95% - 100%)    SUBJECTIVE: Pt seen    Pain: YES  [ ]   NO [ ]   Nausea: [ ] YES [ ] NO           Vomiting: [ ] YES [ ] NO  Flatus: [ ] YES [ ] NO             Bowel Movement: [ ] YES [ ] NO     Void: [ ]YES [ ]No      ALEE DRAINAGE: SIGNIFICANT [ ]   NOT SIGNIFICANT [ ]   NOT APPLICABLE [ ]  YES [ ] NO    General Appearance: Appears well, NAD  Neck: Supple intubated  Chest: Equal expansion bilaterally, equal breath sounds  CV: Pulse regular presently  Abdomen: Soft [x ] YES [ ]NO  DISTENDED [ ] YES [x ] NO TENDERNESS [ ]YES [ ]NO  INCISIONS: HEALING WELL [ ] YES  [ ] NO ERYTHEMA [ ] YES [ ] NO DRAINAGE [ ] YES  [ ] NO  Extremities: Grossly symmetric, CALF TENDERNESS [ ] YES  [ ] NO  LT buttock ulcer healing slowly    LABS:                        8.7    5.79  )-----------( 155      ( 08 Apr 2021 04:50 )             28.6     04-08    143  |  106  |  31<H>  ----------------------------<  105<H>  4.3   |  24  |  3.7<H>    Ca    8.6      08 Apr 2021 04:50  Phos  4.2     04-07  Mg     1.9     04-08    TPro  5.6<L>  /  Alb  2.9<L>  /  TBili  0.3  /  DBili  x   /  AST  12  /  ALT  6   /  AlkPhos  146<H>  04-08    PT/INR - ( 08 Apr 2021 04:50 )   PT: 15.10 sec;   INR: 1.31 ratio         PTT - ( 08 Apr 2021 04:50 )  PTT:31.8 sec        ASSESSMENT:     GOOD POST OP COURSE [ ]  YES  [ ] NO  CONDITION IMPROVING  []  YES  [ ]  NO          PLAN:    CONTINUE PRESENT MANAGEMENT  [ ] YES  [ ] NO

## 2021-04-09 NOTE — CHART NOTE - NSCHARTNOTEFT_GEN_A_CORE
Registered Dietitian Follow-Up     Patient Profile Reviewed                           Yes [x]   No []     Nutrition History Previously Obtained        Yes [x]  No []       Pertinent Subjective Information: Pt. remains intubated. Previous RD recs have not been implemented. Spoke to RN at bedside. pt. received bolus feed this morning 300ml Glucerna. Pt. to be NPO for the rest of the day for OR. Plans for extubation post procedure.      Pertinent Medical Interventions: Cardiac arrest in the setting of arrythmia and CHF exacerbation.  Seizure unknown etiology. Aspiration Pneumonia vs Adenovirus Pneumonia  RT Humerus/Rt femur fracture/Rt Hip fracture - Patient is scheduled for OR  TODAY (4/8) for hip stabilization with ortho. ESRD On HD: Nephro following. HD tomorrow. DM2.      Diet order: Glucerna 1.2 @300ml q6h      Anthropometrics:  - Ht. 157.5cm   - Wt. 60.7kgon 4/9 vs. 64.3kg on 4/6 vs. 58kg pt. with edema, on HD- will continue to monitor wt trends   - %wt change  - BMI23.4  - IBW 110lbs      Pertinent Lab Data: (4/9) WBC 4.72, RBC 2.97, Hg 8.4, Hct 27.3, BUN 23, creat 3.1, glu 141, alk phos 48, eGFR 15      Pertinent Meds: Heparin, Miralax, Fentanyl, Atorvastatin, Protonix, Levophed      Physical Findings:  - Appearance: intubated, 1+ generalized edema   - GI function: abd noted soft/nontender per flow sheets, last BM 4/4  - Tubes: OGT  - Oral/Mouth cavity: NPO  - Skin: ecchymosis      Nutrition Requirements (from RD note on 4/6)   Weight Used: 58kg      Estimated Energy Needs    Continue []  Adjust [x] ~1158kcal (PSE 2003b)   Adjusted Energy Recommendations:   kcal/day        Estimated Protein Needs    Continue [x]  Adjust []  81-93 g (1.4-1.6 g/kg) same as above + ESRD on HD considered;  Adjusted Protein Recommendations:   gm/day        Estimated Fluid Needs        Continue [x]  Adjust [] per CCU team   Adjusted Fluid Recommendations:   mL/day     Nutrient Intake: current TF regimen provides 1440kcal, 71g protein, 971ml free H2O        [] Previous Nutrition Diagnosis: Inadequate protein/energy intake            [x] Ongoing          [] Resolved     As pt. NPO at this time.      Nutrition Intervention:  enteral and parenteral nutrition    Rec: If pt. to remain intubated Provide Paptamen AF @40ml/h with No Carb Prosource TF daily for 1192kcal, 87g protein, 777ml free H2O (103% est calorie needs, 100% est protein needs). If successfully extubated consult SLP for the most appropriate diet texture/consistency.      Goal/Expected Outcome:  In 4 days TF to provide >85% est energy needs, but not exceed 105%     Indicator/Monitoring:  energy intake, body composition, NFPF, electrolyte/renal profile, glucose profile.

## 2021-04-09 NOTE — PROGRESS NOTE ADULT - SUBJECTIVE AND OBJECTIVE BOX
INTERVAL HPI/OVERNIGHT EVENTS:  Patient had episode of AF RVR prior to ORIF yesterday and procedure canceled, now in NSR. Remains on precedex, fentanyl, and levophed. Remains intubated on vent with plan for possible extubation today.    MEDICATIONS  (STANDING):  albuterol/ipratropium (CFC free) Inhaler. 1 Puff(s) Inhalation four times a day  aMIOdarone    Tablet 200 milliGRAM(s) Oral two times a day  aspirin  chewable 81 milliGRAM(s) Oral daily  atorvastatin Oral Tab/Cap - Peds 40 milliGRAM(s) Oral daily  BACItracin   Ointment 1 Application(s) Topical two times a day  cefTRIAXone   IVPB 1000 milliGRAM(s) IV Intermittent every 24 hours  chlorhexidine 0.12% Liquid 15 milliLiter(s) Oral Mucosa every 12 hours  chlorhexidine 4% Liquid 1 Application(s) Topical <User Schedule>  dexMEDEtomidine Infusion 0.2 MICROgram(s)/kG/Hr (3.3 mL/Hr) IV Continuous <Continuous>  dextrose 5% + sodium chloride 0.9%. 1000 milliLiter(s) (50 mL/Hr) IV Continuous <Continuous>  doxercalciferol Injectable 2 MICROGram(s) IV Push <User Schedule>  epoetin mana-epbx (RETACRIT) Injectable 77942 Unit(s) IV Push <User Schedule>  fentaNYL   Infusion. 0.5 MICROgram(s)/kG/Hr (2.9 mL/Hr) IV Continuous <Continuous>  heparin   Injectable 5000 Unit(s) SubCutaneous every 8 hours  levETIRAcetam  IVPB 375 milliGRAM(s) IV Intermittent every 12 hours  levETIRAcetam  IVPB 250 milliGRAM(s) IV Intermittent <User Schedule>  mupirocin 2% Ointment 1 Application(s) Topical two times a day  norepinephrine Infusion 0.05 MICROgram(s)/kG/Min (5.44 mL/Hr) IV Continuous <Continuous>  pantoprazole  Injectable 40 milliGRAM(s) IV Push daily  senna 2 Tablet(s) Oral daily  valproic  acid Syrup 250 milliGRAM(s) Oral two times a day    MEDICATIONS  (PRN):      Allergies  No Known Allergies    REVIEW OF SYSTEMS    [ ] A ten-point review of systems was otherwise negative except as noted.  [x] Due to altered mental status/intubation, subjective information were not able to be obtained from the patient. History was obtained, to the extent possible, from review of the chart and collateral sources of information.      Vital Signs Last 24 Hrs  T(C): 37.2 (07 Apr 2021 08:00), Max: 37.6 (06 Apr 2021 19:00)  T(F): 99 (07 Apr 2021 08:00), Max: 99.7 (06 Apr 2021 19:00)  HR: 88 (07 Apr 2021 09:00) (78 - 100)  BP: 119/63 (07 Apr 2021 09:00) (78/46 - 148/73)  BP(mean): 89 (07 Apr 2021 09:00) (51 - 102)  RR: 20 (07 Apr 2021 09:00) (12 - 46)  SpO2: 100% (07 Apr 2021 09:00) (96% - 100%)    04-06-21 @ 07:01  -  04-07-21 @ 07:00  --------------------------------------------------------  IN: 2217 mL / OUT: 2193 mL / NET: 24 mL    04-07-21 @ 07:01  -  04-07-21 @ 09:25  --------------------------------------------------------  IN: 86 mL / OUT: 5 mL / NET: 81 mL    Physical Exam  GENERAL: In no apparent distress, well nourished, and hydrated.  HEART: Regular, bradycardia; + murmur  PULMONARY: Intubated, rhonchi (anterior)  ABDOMEN: Soft, Nontender, Nondistended; Bowel sounds present  EXTREMITIES:  2+ Peripheral Pulses, No clubbing, cyanosis, or edema  NEUROLOGICAL: Sedated, opens eyes to voice, follows simple commands    LABS:                        8.4    4.96  )-----------( 121      ( 07 Apr 2021 04:20 )             26.8     04-07    144  |  107  |  23<H>  ----------------------------<  140<H>  3.8   |  26  |  3.2<H>    Ca    8.0<L>      07 Apr 2021 04:20  Phos  4.2     04-07  Mg     1.9     04-07    TPro  5.4<L>  /  Alb  3.0<L>  /  TBili  0.3  /  DBili  x   /  AST  12  /  ALT  7   /  AlkPhos  133<H>  04-07    PT/INR - ( 07 Apr 2021 04:20 )   PT: 14.10 sec;   INR: 1.23 ratio         PTT - ( 07 Apr 2021 04:20 )  PTT:32.8 sec      04-06-21 @ 07:01  -  04-07-21 @ 07:00  --------------------------------------------------------  IN: 2217 mL / OUT: 2193 mL / NET: 24 mL    04-07-21 @ 07:01  -  04-07-21 @ 09:25  --------------------------------------------------------  IN: 86 mL / OUT: 5 mL / NET: 81 mL      04-06-21 @ 07:01  -  04-07-21 @ 07:00  --------------------------------------------------------  IN: 2217 mL / OUT: 2193 mL / NET: 24 mL    04-07-21 @ 07:01  -  04-07-21 @ 09:25  --------------------------------------------------------  IN: 86 mL / OUT: 5 mL / NET: 81 mL      RADIOLOGY & ADDITIONAL TESTS:  < from: TTE Echo Complete w/o Contrast w/ Doppler (04.04.21 @ 10:24) >  Summary:   1. LV Ejection Fraction by Renner's Method with a biplane EF of 38 %.   2. Moderately decreased global left ventricular systolic function.   3. Entire inferior wall appears hypokinetic.   4. Mild concentric left ventricular hypertrophy.   5. Severely reduced RV systolic function.   6. Severely enlarged right ventricle.   7. Severely enlarged left atrium.   8. Peak transaortic gradient equals 48.8 mmHg, mean transaortic gradient equals 24.7 mmHg, the calculated aortic valve area equals 0.75 cm² by the continuity equation consistent with severe aortic stenosis.   9. The mitral valve leaflets are tethered due to reduced systolic function and elevated LVDP.  10. Degenerative mitral valve.  11. Moderate mitral annular calcification.  12. Severe mitral regurgitation.  13. Moderate tricuspid regurgitation.  14. Estimated pulmonary artery systolic pressure is 49.7 mmHg assuming a right atrial pressure of 15 mmHg, which is consistent with mild pulmonary hypertension.    PHYSICIAN INTERPRETATION:  Left Ventricle: The left ventricular internal cavity size is normal. Left ventricular wall thickness is mildly increased. There is mild concentric left ventricular hypertrophy. Global LV systolic function was moderately decreased. Entire inferior wall appears hypokinetic.  Right Ventricle: The right ventricular size is severely enlarged. RV systolic function is severely reduced.  Left Atrium: Severely enlarged left atrium.  Right Atrium: Moderately enlarged right atrium.  Pericardium:There is no evidence of pericardial effusion.  Mitral Valve: The mitral valve is degenerative in appearance. The mitral valve leaflets are tethered which is due to reduced systolic function and elevated LVDP. There is moderate mitral annular calcification. Severe mitral regurgitation.  Tricuspid Valve: The tricuspid valve is normal in structure. Moderate tricuspid regurgitation is visualized. Estimated pulmonary artery systolic pressure is 49.7 mmHg assuming a right atrial pressure of 15 mmHg, which is consistent with mild pulmonary hypertension.  Aortic Valve: The aortic valve is trileaflet. Sclerotic aortic valve with decreased opening. Peak transaortic gradient equals 48.8 mmHg, mean transaortic gradient equals 24.7 mmHg, the calculated aortic valve area equals 0.75 cm² by the continuity equation consistent with severe aortic stenosis. No aortic regurgitation.  Pulmonic Valve: The pulmonic valve is normal. Mild pulmonic regurgitation.  Aorta: Aortic root measured at sinotubular junction is normal.    < end of copied text >

## 2021-04-09 NOTE — PROGRESS NOTE ADULT - ASSESSMENT
69 yo F with PMH of CAD s/p CABG (November 2019), HFrEF (EF 20%), AS, ESRD (MWF), bladder prolapse requiring chronic Castellon, Diabetes Type 2 on insulin.    Acute on chronic systolic HF/ ESRD on HD/ Respiratory distress / possible ventricular arrhythmia     Continue MV - management per PCCM team   Plan for CRT-D - timing per EP  Continue pressor support - wean as tolerated by BP     Continue fluid removal via HD    Continue to hold HF meds  Ortho follow up - plan for ORIF   Will continue to follow   Plan discussed with CCU team   71 yo F with PMH of CAD s/p CABG (November 2019), HFrEF (EF 20%), AS, ESRD (MWF), bladder prolapse requiring chronic Castellon, Diabetes Type 2 on insulin.      Acute on chronic systolic HF/ ESRD on HD/ Respiratory distress / possible ventricular arrhythmia     Continue MV - management per PCCM team   Coronary angiogram today; plan for extubation if no CRT-D   EP follow up   Continue pressor support - wean as tolerated by BP     Continue fluid removal via HD    Hold HF meds  Ortho follow up  Will continue to follow   Plan discussed with CCU team

## 2021-04-09 NOTE — PROGRESS NOTE ADULT - ASSESSMENT
1. Odontoid fracture. Neurosurgery following.  2. R hip / R humerus fracture. Ortho following. OR cancelled yesterday.  3. ESRD on HD TTS. HD tomorrow: 3 hours, opti 160 dialyzer, 3K bath, 2L UF.  4. Anemia. EPO with HD.  5. Secondary hyperparathyroidism. Hectorol with HD.

## 2021-04-09 NOTE — PROGRESS NOTE ADULT - SUBJECTIVE AND OBJECTIVE BOX
GUS WILBURN 70y Female  MRN#: [id number]       SUBJECTIVE  Patient is a 70y old Female who presents with a chief complaint of hip fracture (08 Apr 2021 11:05)      ***    Today is hospital day 8d, and this morning she is comfortable this morning and still on the following infusions: amiodarone, fentanyl, precedex, and levophed.     No acute overnight events.     OBJECTIVE  PAST MEDICAL & SURGICAL HISTORY  Diabetes    Congestive heart failure (CHF)    Pleural effusion due to congestive heart failure    End stage renal disease  on dialysis Mon, Wed, Fr    Uterine prolapse    Chronic indwelling Castellon catheter    History of repair of hip fracture  2019    S/P CABG (coronary artery bypass graft)  11/2019    History of thoracentesis    Chronic indwelling Castellon catheter      ALLERGIES:  No Known Allergies    MEDICATIONS:  STANDING MEDICATIONS  albuterol/ipratropium (CFC free) Inhaler. 1 Puff(s) Inhalation four times a day  aMIOdarone    Tablet 200 milliGRAM(s) Oral two times a day  aMIOdarone Infusion 0.5 mG/Min IV Continuous <Continuous>  aspirin  chewable 81 milliGRAM(s) Oral daily  atorvastatin Oral Tab/Cap - Peds 40 milliGRAM(s) Oral daily  BACItracin   Ointment 1 Application(s) Topical two times a day  chlorhexidine 0.12% Liquid 15 milliLiter(s) Oral Mucosa every 12 hours  chlorhexidine 4% Liquid 1 Application(s) Topical <User Schedule>  dexMEDEtomidine Infusion 0.2 MICROgram(s)/kG/Hr IV Continuous <Continuous>  doxercalciferol Injectable 2 MICROGram(s) IV Push <User Schedule>  epoetin mana-epbx (RETACRIT) Injectable 26732 Unit(s) IV Push <User Schedule>  fentaNYL   Infusion. 0.5 MICROgram(s)/kG/Hr IV Continuous <Continuous>  heparin   Injectable 5000 Unit(s) SubCutaneous every 8 hours  levETIRAcetam  IVPB 375 milliGRAM(s) IV Intermittent every 12 hours  levETIRAcetam  IVPB 250 milliGRAM(s) IV Intermittent <User Schedule>  norepinephrine Infusion 0.05 MICROgram(s)/kG/Min IV Continuous <Continuous>  pantoprazole  Injectable 40 milliGRAM(s) IV Push daily  polyethylene glycol 3350 17 Gram(s) Oral at bedtime  valproic  acid Syrup 250 milliGRAM(s) Oral two times a day    PRN MEDICATIONS      VITAL SIGNS: Last 24 Hours  T(C): 36.7 (09 Apr 2021 08:00), Max: 37.1 (08 Apr 2021 17:00)  T(F): 98 (09 Apr 2021 08:00), Max: 98.8 (08 Apr 2021 20:00)  HR: 94 (09 Apr 2021 11:00) (84 - 100)  BP: 127/65 (08 Apr 2021 17:00) (104/59 - 137/60)  BP(mean): 89 (08 Apr 2021 17:00) (87 - 89)  RR: 95 (09 Apr 2021 11:00) (11 - 111)  SpO2: 100% (09 Apr 2021 11:00) (93% - 100%)    LABS:                        8.4    4.72  )-----------( 149      ( 09 Apr 2021 04:40 )             27.3     04-09    136  |  100  |  23<H>  ----------------------------<  141<H>  4.1   |  26  |  3.1<H>    Ca    8.8      09 Apr 2021 04:40  Mg     1.8     04-09    TPro  5.4<L>  /  Alb  2.7<L>  /  TBili  0.3  /  DBili  x   /  AST  12  /  ALT  <5  /  AlkPhos  148<H>  04-09    PT/INR - ( 09 Apr 2021 04:40 )   PT: 16.50 sec;   INR: 1.43 ratio         PTT - ( 09 Apr 2021 04:40 )  PTT:29.0 sec    ABG - ( 09 Apr 2021 09:10 )  pH, Arterial: 7.37  pH, Blood: x     /  pCO2: 47    /  pO2: 79    / HCO3: 27    / Base Excess: 1.2   /  SaO2: 96          RADIOLOGY: reviewed      PHYSICAL EXAM:    GENERAL: NAD, well-developed, intubated  HEENT:  Atraumatic, Normocephalic conjunctiva and sclera clear, No JVD  PULMONARY: Clear to auscultation bilaterally; No wheeze  CARDIOVASCULAR: Regular rate and rhythm, no m/r/g  GASTROINTESTINAL: Soft, Nontender, Nondistended; Bowel sounds present  MUSCULOSKELETAL:  2+ Peripheral Pulses, No clubbing, cyanosis, or edema  NEUROLOGY: non-focal, follows commands, can move all extremities  SKIN: dry healing intact perirectal abscess       ADMISSION SUMMARY  Patient is a 70y old Female who presents with a chief complaint of hip fracture (08 Apr 2021 11:05)

## 2021-04-09 NOTE — PROGRESS NOTE ADULT - ASSESSMENT
Assessment: 71 yo F with PMH of CAD s/p CABG (November 2019), HFrEF (EF 20%), AS, ESRD (MWF), bladder prolapse requiring chronic Castellon, Diabetes Type 2 on insulin presents from home s/p mechanical fall with Multiple traumas (R humeral fracture / R hip fracture / non displaced odontoid fracture/ acute rib fractures/ ? T6 vertebral fracture). Found to be pulseless in trauma bay (not on monitor) with ROSC after 2 min. Seizurelike activities when sedation held, confirmed on vEEG. Extubated 4/2, followed by arrhythmia AF vs VT was reintubated and cardioverted. 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, RBBB, LAFB)  Hx of GI bleed  Acute decompensated CHF, Ischemic CMP  Anemia acute on chronic  CAD s/p CABG in 2019, NUBIA clip   Echo with Severe AS, Moderate to severe MR , EF 35%  Hx ESRD on HD  Hx of GI bleed  COVID negative 4/3    Plan:  - Patient has known AF and outside stimuli will cause RVR, need to treat medically with Amiodarone or Lopressor if tolerated  - Would recommend Amio bolus 150mg prior to OR if continuing with ORIF  - If plan to DCCV, will need JAMILA prior to this unless emergent  - Will plan for CRT-D prior to discharge, not emergent at this time  - Cont Amio to PO 200mg q12h   - Metoprolol on hold  - Wean levo as tolerated  - Anemia, work up for any source of bleeding  - Pt not a candidate for AC d/t hx of GIB  - Isolation precautions dc'd  - Neurology clearance prior to OR

## 2021-04-09 NOTE — ANESTHESIA FOLLOW-UP NOTE - NSEVALATIONFT_GEN_ALL_CORE
pt's procedure cancelled yesterday due to  SVT  that converted after adenosine to Afib  due to pt's extensive cardiac history, CHF with cardiogenic/septic shock on levophed, and the arrhythmia with low dose of inhaled anesthetics, procedure was cancelled pt's procedure cancelled yesterday due to  SVT  that converted after adenosine to Afib  due to pt's extensive cardiac history that included severe aortic stenosis, CHF with cardiogenic/septic shock on levophed, and the arrhythmia with low dose of inhaled anesthetics prior to any surgical stimulation, procedure was cancelled

## 2021-04-09 NOTE — CHART NOTE - NSCHARTNOTEFT_GEN_A_CORE
RK: Spoke with orthopaedic and neurosurgery. Both are okay with starting dual antiplatelets s/p catherization if needed.

## 2021-04-09 NOTE — PROGRESS NOTE ADULT - SUBJECTIVE AND OBJECTIVE BOX
[facility] FOLLOW UP NOTE  --------------------------------------------------------------------------------  24 hour events/subjective: Patient examined. Intubated.    PAST HISTORY  --------------------------------------------------------------------------------  No significant changes to PMH, PSH, FHx, SHx, unless otherwise noted    ALLERGIES & MEDICATIONS  --------------------------------------------------------------------------------  Allergies    No Known Allergies    Standing Inpatient Medications  albuterol/ipratropium (CFC free) Inhaler. 1 Puff(s) Inhalation four times a day  aMIOdarone    Tablet 200 milliGRAM(s) Oral two times a day  aMIOdarone Infusion 0.5 mG/Min IV Continuous <Continuous>  aspirin  chewable 81 milliGRAM(s) Oral daily  atorvastatin Oral Tab/Cap - Peds 40 milliGRAM(s) Oral daily  BACItracin   Ointment 1 Application(s) Topical two times a day  chlorhexidine 0.12% Liquid 15 milliLiter(s) Oral Mucosa every 12 hours  chlorhexidine 4% Liquid 1 Application(s) Topical <User Schedule>  dexMEDEtomidine Infusion 0.2 MICROgram(s)/kG/Hr IV Continuous <Continuous>  doxercalciferol Injectable 2 MICROGram(s) IV Push <User Schedule>  epoetin mana-epbx (RETACRIT) Injectable 97335 Unit(s) IV Push <User Schedule>  fentaNYL   Infusion. 0.5 MICROgram(s)/kG/Hr IV Continuous <Continuous>  heparin   Injectable 5000 Unit(s) SubCutaneous every 8 hours  levETIRAcetam  IVPB 375 milliGRAM(s) IV Intermittent every 12 hours  levETIRAcetam  IVPB 250 milliGRAM(s) IV Intermittent <User Schedule>  norepinephrine Infusion 0.05 MICROgram(s)/kG/Min IV Continuous <Continuous>  pantoprazole  Injectable 40 milliGRAM(s) IV Push daily  polyethylene glycol 3350 17 Gram(s) Oral at bedtime  valproic  acid Syrup 250 milliGRAM(s) Oral two times a day    VITALS/PHYSICAL EXAM  --------------------------------------------------------------------------------  T(C): 36.7 (04-09-21 @ 08:00), Max: 37.1 (04-08-21 @ 12:00)  HR: 98 (04-09-21 @ 10:00) (84 - 104)  BP: 127/65 (04-08-21 @ 17:00) (104/59 - 137/60)  RR: 111 (04-09-21 @ 10:00) (11 - 111)  SpO2: 100% (04-09-21 @ 10:00) (93% - 100%)  Wt(kg): --  Height (cm): 157.5 (04-07-21 @ 16:58)  Weight (kg): 58 (04-07-21 @ 16:58)  BMI (kg/m2): 23.4 (04-07-21 @ 16:58)  BSA (m2): 1.58 (04-07-21 @ 16:58)      04-08-21 @ 07:01  -  04-09-21 @ 07:00  --------------------------------------------------------  IN: 1151.6 mL / OUT: 1680 mL / NET: -528.4 mL      Physical Exam:  	Gen: NAD  	Pulm: CTA B/L  	CV: RRR, S1S2  	Abd: +BS, soft, nontender/nondistended  	: No suprapubic tenderness  	LE: Warm, no edema  	Vascular access: TDC    LABS/STUDIES  --------------------------------------------------------------------------------              8.4    4.72  >-----------<  149      [04-09-21 @ 04:40]              27.3     136  |  100  |  23  ----------------------------<  141      [04-09-21 @ 04:40]  4.1   |  26  |  3.1        Ca     8.8     [04-09-21 @ 04:40]      Mg     1.8     [04-09-21 @ 04:40]    TPro  5.4  /  Alb  2.7  /  TBili  0.3  /  DBili  x   /  AST  12  /  ALT  <5  /  AlkPhos  148  [04-09-21 @ 04:40]    PT/INR: PT 16.50, INR 1.43       [04-09-21 @ 04:40]  PTT: 29.0       [04-09-21 @ 04:40]    Creatinine Trend:  SCr 3.1 [04-09 @ 04:40]  SCr 3.7 [04-08 @ 04:50]  SCr 3.2 [04-07 @ 04:20]  SCr 4.6 [04-06 @ 08:19]  SCr 3.8 [04-05 @ 04:30]    Urinalysis - [04-02-21 @ 10:44]      Color Yellow / Appearance Turbid / SG 1.027 / pH 7.0      Gluc Negative / Ketone Negative  / Bili Negative / Urobili <2 mg/dL       Blood Moderate / Protein 100 mg/dL / Leuk Est Large / Nitrite Negative      RBC 14 / WBC >720 / Hyaline 16 / Gran  / Sq Epi  / Non Sq Epi 3 / Bacteria Moderate    Iron 55, TIBC 167, %sat 33      [05-27-20 @ 13:22]  Ferritin 636      [05-27-20 @ 13:22]  HbA1c 5.7      [11-09-19 @ 01:20]  TSH 1.71      [04-06-21 @ 04:40]

## 2021-04-09 NOTE — PROGRESS NOTE ADULT - ASSESSMENT
69 y/o F with a pmhx of CAD s/p CABG (Nov 2019), HFrEF (EF 20%), AS, ESRD (MWF), bladder prolapse requiring chronic Castellon, Diabetes Type 2 on insulin presents from home s/p mechanical fall. Trauma workup in ED significant for R humeral fracture / R hip fracture / non displaced odontoid fracture/ acute rib fractures/ ? T6 vertebral fracture. In the ED patient arrested, ROSC achieved after 2 minutes of CPR. EKG at the time showed RBBB with wide complex tachy. Pt subsequently intubated and sedated, started on pressors. Pt Experienced seizure-like episode during weaning trial s/p 2mg Versed.  (4/2/21) Weaning trial passed. Patient was Extubated . 02 sat began to decrease to low 80s, tachypenic and cyanotic started on bipap with no improvement. Patient again became hypotensive and tachy. Went into Afib. s/p cardioversion x3 and started on amio     # RT Humerus/Rt femur fracture/Rt Hip fracture   - Ortho following  - Pain control. Currently on Fentanyl drip   - Patient is scheduled for OR  TODAY (4/8) for hip stabilization with ortho.   - 1u of pRBCs for procedure   - Patient is NPO for procedure      #Cardiac arrest in the setting of arythmia and CHF exacerbation   - Trops .22-->.24 -->.21-->.34  - CTA negative for PE   - Lower extremity duplex negative for DVT( 4/6/21)  - EP recs : Plan for CRT-D placement FRIDAY before extubation due to C2 fracture. Will be scheduled for end of day due to Adenovirus infection.   - Patient is NPO for procedure   - On heparin subq   - F/u TSH results   - weaning Norepinephrine     # Seizure unknown etiology  - CT Head (4/1) negative   - Keppra 500mg adminstered. followed by 375mg BID with additional 250mg dose after dialysis  - Started on Depakote 250mg PO BID   - VEEG - bifrontal sharps but no seizure activity. Consistent with metabolic process.     # Aspiration Pneumonia vs Adenovirus Pneumonia   - CXR (4/5) Bilateral opacities, decreased. Stable cardiomegaly/left pleural effusion..  - ID Recs : Unasyn 3 gm iv q24h, D/c rocephin, Deep ET cultures  - s/p Completion of ceftriaxone     # Perirectal abscess  - surgery is following patient. Dr Mota   - s/p debridement over a month. No need for packing as per surgery     #Odontoid fracture   - Neurosurgery following patient   - Want to c/w collar      #ESRD  - Nephro following patient  - Patient s/p HD today 4/8    # h/o DM   - fingersticks tid  - hold home insulin  - insulin protocol if > 180    # DVT PPX: heparin subq  # GI PPX: PPI  # Fluids : D5 LR @ 50  # Diet: NPO   # Activity: bedrest  # Bowel regimen 71 y/o F with a pmhx of CAD s/p CABG (Nov 2019), HFrEF (EF 20%), AS, ESRD (MWF), bladder prolapse requiring chronic Castellon, Diabetes Type 2 on insulin presents from home s/p mechanical fall. Trauma workup in ED significant for R humeral fracture / R hip fracture / non displaced odontoid fracture/ acute rib fractures/ ? T6 vertebral fracture. In the ED patient arrested, ROSC achieved after 2 minutes of CPR. EKG at the time showed RBBB with wide complex tachy. Pt subsequently intubated and sedated, started on pressors. Pt Experienced seizure-like episode during weaning trial s/p 2mg Versed.  (4/2/21) Weaning trial passed. Patient was Extubated . 02 sat began to decrease to low 80s, tachypenic and cyanotic started on bipap with no improvement. Patient again became hypotensive and tachy. Went into Afib. s/p cardioversion x3 and started on amio     # RT Humerus/Rt femur fracture/Rt Hip fracture   - Ortho following  - Pain control. Currently on Fentanyl drip   - Patient is scheduled for OR  TODAY (4/8) for hip stabilization with ortho.   - 1u of pRBCs for procedure   - Patient is NPO for procedure      #Cardiac arrest in the setting of arythmia and CHF exacerbation   - she will be taken for heart catherization today   - Trops .22-->.24 -->.21-->.34 (4/4/21)  - CTA negative for PE   - Lower extremity duplex negative for DVT( 4/6/21)  - EP recs : Plan for CRT-D placement FRIDAY before extubation due to C2 fracture. Will be scheduled for end of day due to Adenovirus infection.   - Patient is NPO for procedure   - On heparin subq   - F/u TSH results   - weaning Norepinephrine     # Seizure unknown etiology  - CT Head (4/1) negative   - Keppra 500mg adminstered. followed by 375mg BID with additional 250mg dose after dialysis  - Started on Depakote 250mg PO BID   - VEEG - bifrontal sharps but no seizure activity. Consistent with metabolic process.     # Aspiration Pneumonia vs Adenovirus Pneumonia   - CXR (4/5) Bilateral opacities, decreased. Stable cardiomegaly/left pleural effusion..  - ID Recs : Unasyn 3 gm iv q24h, D/c rocephin, Deep ET cultures  - s/p Completion of ceftriaxone     # Perirectal abscess  - surgery is following patient. Dr Mota   - s/p debridement over a month. No need for packing as per surgery     #Odontoid fracture   - Neurosurgery following patient   - Want to c/w collar      #ESRD  - Nephro following patient  - Patient s/p HD today 4/8    # h/o DM   - fingersticks tid  - hold home insulin  - insulin protocol if > 180    # DVT PPX: heparin subq  # GI PPX: PPI  # Fluids : D5 LR @ 50  # Diet: NPO   # Activity: bedrest  # Bowel regimen

## 2021-04-09 NOTE — PROGRESS NOTE ADULT - SUBJECTIVE AND OBJECTIVE BOX
Date of Admission: 4/1/21    Interval history:     - Patient ORIF cancelled yesterday   - Remains intubated      HISTORY OF PRESENT ILLNESS:     71 yo F with PMH of CAD s/p CABG (November 2019), HFrEF (EF 20%), AS, ESRD (MWF), bladder prolapse requiring chronic Castellon, Diabetes Type 2 on insulin presents from home s/p mechanical fall. When the history was taken in the emergency department, she The denied any dizziness/ palpitations/ aura/vertigo. She reported non-radiating R shoulder pain and non-radiating R hip pain on arrival. In the emergency department, while the patient was receiving trauma work up she was found pulseless by a PCA in the room, had a cardiac arrest. ROSC was achieved after 2 minutes of CPR (1 epinephrine was given and 1 calcium chloride iv). Patient was intubated and sedated. EKG showed RBBB with wide complex tachycardia.   Per chart the patient had her last hemodialysis yesterday and has been relatively non compliant with her medications and medical care at home.   Of note in a previous call to the heart failure team, patient reported falling asleep for few seconds while doing the dishes couple of times.      PAST MEDICAL & SURGICAL HISTORY:    Diabetes  Congestive heart failure (CHF)  Pleural effusion due to congestive heart failure  End stage renal disease on dialysis Mon, Wed, Fr  Uterine prolapse  Chronic indwelling Castellon catheter  History of repair of hip fracture 2019  S/P CABG (coronary artery bypass graft) 11/2019  History of thoracentesis  Chronic indwelling Castellon catheter      FAMILY HISTORY:    Mother: MI and stomach cancer    SOCIAL HISTORY:      Former smoker      Allergies    No Known Allergies    Intolerances      PHYSICAL EXAM:    General Appearance: well appearing, normal for age and gender. 	  Neck: normal JVP, no bruit.   Cardiovascular: regular rate and rhythm S1 S2, No JVD, No murmurs, No edema  Respiratory: Lungs clear to auscultation	  Psychiatry: sedated but opens eyes   Gastrointestinal:  Soft, Non-tender  Skin/Integumen: No rashes, No ecchymoses, No cyanosis	  Neurologic: Non-focal, opens eyes and understands when spoken to  Musculoskeletal/ extremities: Normal range of motion, No clubbing, cyanosis or edema  Vascular: Peripheral pulses palpable 2+ bilaterally  	    PREVIOUS DIAGNOSTIC TESTING:      TTE 5/17/21  Summary:   1. Left ventricular ejection fraction, by visual estimation, is 20 to 25%.   2. Severely decreased global left ventricular systolic function.   3. The mitral valve leaflets are tethered due to reduced systolic function and elevated LVDP.   4. Moderate mitral annular calcification.   5. Moderate-to-severe mitral regurgitation.   6. Peak transaortic gradient equals 25.2 mmHg, mean transaortic gradient equals 8.8 mmHg, the calculated aortic valve area equals 1.07 cm² by the continuity equation consistent with moderate aortic stenosis (possible LFLG).   7. Estimated JENNIFER = 1.24 cm2 by planimetry.   8. Trivial aortic regurgitation.   9. Moderate tricuspid regurgitation.  10. Estimated pulmonary artery systolic pressure is 44.5 mmHg assuming a right atrial pressure of 8 mmHg, which is consistent with mild pulmonary hypertension.  11. LA volume Index is 71.0 ml/m² ml/m2.  12. Bilateral pleural effusions.      Home Medications:  aspirin 81 mg oral tablet, chewable: 1 tab(s) orally once a day (01 Apr 2021 11:06)  atorvastatin 40 mg oral tablet: 1 tab(s) orally once a day (01 Apr 2021 11:06)  INSULIN LISPRO KWIKPEN  100 UNIT/ML SOPN:  (01 Apr 2021 11:06)  METOLAZONE  5 MG TABS: 1  orally once a day (01 Apr 2021 11:06)  METOPROLOL SUCCINATE ER  25 MG TB24: 1  orally 2 times a day (01 Apr 2021 11:06)  SERTRALINE HCL  50 MG TABS:  (01 Apr 2021 11:06)  SEVELAMER CARBONATE 800MG TABLETS: TK 2 TS PO TID WITH MEALS (01 Apr 2021 11:06)  TORSEMIDE 20MG TABLETS: TK 3 TS PO BID (01 Apr 2021 11:06)  VITAMIN D2 [id number] (ERGO) CAP RX: TK 1 C PO WEEKLY (01 Apr 2021 11:06)   Date of Admission: 4/1/21    Interval history:     - ORIF cancelled yesterday   - Remains intubated and sedated       HISTORY OF PRESENT ILLNESS:     71 yo F with PMH of CAD s/p CABG (November 2019), HFrEF (EF 20%), AS, ESRD (MWF), bladder prolapse requiring chronic Castellon, Diabetes Type 2 on insulin presents from home s/p mechanical fall. When the history was taken in the emergency department, she The denied any dizziness/ palpitations/ aura/vertigo. She reported non-radiating R shoulder pain and non-radiating R hip pain on arrival. In the emergency department, while the patient was receiving trauma work up she was found pulseless by a PCA in the room, had a cardiac arrest. ROSC was achieved after 2 minutes of CPR (1 epinephrine was given and 1 calcium chloride iv). Patient was intubated and sedated. EKG showed RBBB with wide complex tachycardia.   Per chart the patient had her last hemodialysis yesterday and has been relatively non compliant with her medications and medical care at home.   Of note in a previous call to the heart failure team, patient reported falling asleep for few seconds while doing the dishes couple of times.      PAST MEDICAL & SURGICAL HISTORY:    Diabetes  Congestive heart failure (CHF)  Pleural effusion due to congestive heart failure  End stage renal disease on dialysis Mon, Wed, Fr  Uterine prolapse  Chronic indwelling Castellon catheter  History of repair of hip fracture 2019  S/P CABG (coronary artery bypass graft) 11/2019  History of thoracentesis  Chronic indwelling Castellon catheter      FAMILY HISTORY:    Mother: MI and stomach cancer    SOCIAL HISTORY:      Former smoker      Allergies    No Known Allergies    Intolerances      PHYSICAL EXAM:    General Appearance: well appearing, normal for age and gender. 	  Neck: normal JVP, no bruit.   Cardiovascular: regular rate and rhythm S1 S2, No JVD, No murmurs, No edema  Respiratory: Lungs clear to auscultation	  Psychiatry: sedated but opens eyes   Gastrointestinal:  Soft, Non-tender  Skin/Integumen: No rashes, No ecchymoses, No cyanosis	  Neurologic: Non-focal, opens eyes and understands when spoken to  Musculoskeletal/ extremities: Normal range of motion, No clubbing, cyanosis or edema  Vascular: Peripheral pulses palpable 2+ bilaterally  	    PREVIOUS DIAGNOSTIC TESTING:      TTE 5/17/21  Summary:   1. Left ventricular ejection fraction, by visual estimation, is 20 to 25%.   2. Severely decreased global left ventricular systolic function.   3. The mitral valve leaflets are tethered due to reduced systolic function and elevated LVDP.   4. Moderate mitral annular calcification.   5. Moderate-to-severe mitral regurgitation.   6. Peak transaortic gradient equals 25.2 mmHg, mean transaortic gradient equals 8.8 mmHg, the calculated aortic valve area equals 1.07 cm² by the continuity equation consistent with moderate aortic stenosis (possible LFLG).   7. Estimated JENNIFER = 1.24 cm2 by planimetry.   8. Trivial aortic regurgitation.   9. Moderate tricuspid regurgitation.  10. Estimated pulmonary artery systolic pressure is 44.5 mmHg assuming a right atrial pressure of 8 mmHg, which is consistent with mild pulmonary hypertension.  11. LA volume Index is 71.0 ml/m² ml/m2.  12. Bilateral pleural effusions.      Home Medications:  aspirin 81 mg oral tablet, chewable: 1 tab(s) orally once a day (01 Apr 2021 11:06)  atorvastatin 40 mg oral tablet: 1 tab(s) orally once a day (01 Apr 2021 11:06)  INSULIN LISPRO KWIKPEN  100 UNIT/ML SOPN:  (01 Apr 2021 11:06)  METOLAZONE  5 MG TABS: 1  orally once a day (01 Apr 2021 11:06)  METOPROLOL SUCCINATE ER  25 MG TB24: 1  orally 2 times a day (01 Apr 2021 11:06)  SERTRALINE HCL  50 MG TABS:  (01 Apr 2021 11:06)  SEVELAMER CARBONATE 800MG TABLETS: TK 2 TS PO TID WITH MEALS (01 Apr 2021 11:06)  TORSEMIDE 20MG TABLETS: TK 3 TS PO BID (01 Apr 2021 11:06)  VITAMIN D2 [id number] (ERGO) CAP RX: TK 1 C PO WEEKLY (01 Apr 2021 11:06)

## 2021-04-09 NOTE — CHART NOTE - NSCHARTNOTEFT_GEN_A_CORE
RK: Daughter Christie asked if we could keep her updated with her mom's medical conditions. Phone number [phone]

## 2021-04-09 NOTE — PROGRESS NOTE ADULT - ATTENDING COMMENTS
- Patient was taken to OR for ortho procedure- was canceled for 'AF'. No clear documentation. Assuming patient had rapid AF.  - Will continue Amiodarone. Consider adding 150 mg Amio bolus ~ 30 minutes prior to procedure  - If episodes of recurrent AF/RVR despite of medical therapy and no reversible causes, we have to consider CRT-D along with AVN ablation. However, prefer to have device after ortho surgery (and neuro surgery if that is indicated)  - Having ICD will not change her risk for the procedure  - Possible [facility] today  - Can extubate from EP standpoint of view when stable  - D/w critical care team and family at length about overall plan. Family was disappointed with overall care and continuous change in plan. Discussed and explained change in plan from EP standpoint view. They understand.

## 2021-04-09 NOTE — PROGRESS NOTE ADULT - SUBJECTIVE AND OBJECTIVE BOX
Patient is a 70y old  Female who presents with a chief complaint of hip fracture (08 Apr 2021 11:05)        Pt remains critically ill on MV      ROS:     All ROS are negative except HPI         PHYSICAL EXAM    ICU Vital Signs Last 24 Hrs  T(C): 37.4 (09 Apr 2021 12:00), Max: 37.4 (09 Apr 2021 12:00)  T(F): 99.3 (09 Apr 2021 12:00), Max: 99.3 (09 Apr 2021 12:00)  HR: 94 (09 Apr 2021 12:00) (84 - 100)  BP: 127/65 (08 Apr 2021 17:00) (110/60 - 127/65)  BP(mean): 89 (08 Apr 2021 17:00) (89 - 89)  ABP: 110/54 (09 Apr 2021 12:00) (94/50 - 138/64)  ABP(mean): 74 (09 Apr 2021 12:00) (64 - 90)  RR: 12 (09 Apr 2021 12:00) (11 - 92)  SpO2: 100% (09 Apr 2021 12:00) (93% - 100%)      CONSTITUTIONAL:  sedated on MV    ENT:   pos Et tube    EYES:   Pupils equal,   Round and reactive to light.    CARDIAC:   Normal rate,   Regular rhythm.    No edema      Vascular:  Normal systolic impulse  No Carotid bruits    RESPIRATORY:   No wheezing  Bilateral BS  Normal chest expansion  Not tachypneic,  No use of accessory muscles    GASTROINTESTINAL:  Abdomen soft,   Non-tender,   No guarding,   + BS    MUSCULOSKELETAL:   Range of motion is not limited,  No clubbing, cyanosis    NEUROLOGICAL:   Alert and oriented   No motor  deficits.    SKIN:   Skin normal color for race,   Warm and dry and intact.   No evidence of rash.    PSYCHIATRIC:   Normal mood and affect.   No apparent risk to self or others.    HEMATOLOGICAL:  No cervical  lymphadenopathy.  no inguinal lymphadenopathy      04-08-21 @ 07:01  -  04-09-21 @ 07:00  --------------------------------------------------------  IN:    Amiodarone: 250.1 mL    Dexmedetomidine: 377.4 mL    FentaNYL: 431.4 mL    Norepinephrine: 92.7 mL  Total IN: 1151.6 mL    OUT:    Indwelling Catheter - Urethral (mL): 80 mL    Other (mL): 1600 mL  Total OUT: 1680 mL    Total NET: -528.4 mL          LABS:                            8.4    4.72  )-----------( 149      ( 09 Apr 2021 04:40 )             27.3                                               04-09    136  |  100  |  23<H>  ----------------------------<  141<H>  4.1   |  26  |  3.1<H>    Ca    8.8      09 Apr 2021 04:40  Mg     1.8     04-09    TPro  5.4<L>  /  Alb  2.7<L>  /  TBili  0.3  /  DBili  x   /  AST  12  /  ALT  <5  /  AlkPhos  148<H>  04-09      PT/INR - ( 09 Apr 2021 04:40 )   PT: 16.50 sec;   INR: 1.43 ratio         PTT - ( 09 Apr 2021 04:40 )  PTT:29.0 sec                                                                                     LIVER FUNCTIONS - ( 09 Apr 2021 04:40 )  Alb: 2.7 g/dL / Pro: 5.4 g/dL / ALK PHOS: 148 U/L / ALT: <5 U/L / AST: 12 U/L / GGT: x                                                                                               Mode: AC/ CMV (Assist Control/ Continuous Mandatory Ventilation)  RR (machine): 12  TV (machine): 350  FiO2: 30  PEEP: 5  MAP: 8  PIP: 28                                      ABG - ( 09 Apr 2021 09:10 )  pH, Arterial: 7.37  pH, Blood: x     /  pCO2: 47    /  pO2: 79    / HCO3: 27    / Base Excess: 1.2   /  SaO2: 96                  MEDICATIONS  (STANDING):  albuterol/ipratropium (CFC free) Inhaler. 1 Puff(s) Inhalation four times a day  aMIOdarone    Tablet 200 milliGRAM(s) Oral two times a day  aMIOdarone Infusion 0.5 mG/Min (16.7 mL/Hr) IV Continuous <Continuous>  aspirin  chewable 81 milliGRAM(s) Oral daily  atorvastatin Oral Tab/Cap - Peds 40 milliGRAM(s) Oral daily  BACItracin   Ointment 1 Application(s) Topical two times a day  chlorhexidine 0.12% Liquid 15 milliLiter(s) Oral Mucosa every 12 hours  chlorhexidine 4% Liquid 1 Application(s) Topical <User Schedule>  dexMEDEtomidine Infusion 0.2 MICROgram(s)/kG/Hr (3.3 mL/Hr) IV Continuous <Continuous>  doxercalciferol Injectable 2 MICROGram(s) IV Push <User Schedule>  epoetin mana-epbx (RETACRIT) Injectable 46246 Unit(s) IV Push <User Schedule>  fentaNYL   Infusion. 0.5 MICROgram(s)/kG/Hr (2.9 mL/Hr) IV Continuous <Continuous>  heparin   Injectable 5000 Unit(s) SubCutaneous every 8 hours  levETIRAcetam  IVPB 375 milliGRAM(s) IV Intermittent every 12 hours  levETIRAcetam  IVPB 250 milliGRAM(s) IV Intermittent <User Schedule>  norepinephrine Infusion 0.05 MICROgram(s)/kG/Min (5.44 mL/Hr) IV Continuous <Continuous>  pantoprazole  Injectable 40 milliGRAM(s) IV Push daily  polyethylene glycol 3350 17 Gram(s) Oral at bedtime  valproic  acid Syrup 250 milliGRAM(s) Oral two times a day    MEDICATIONS  (PRN):      New X-rays reviewed:                                                                                  ECHO    CXR interpreted by me:

## 2021-04-09 NOTE — PROGRESS NOTE ADULT - ASSESSMENT
1. Cardiac Arrest: secondary to VFIB in nature, continue supportive care, monitor for now. Monitor for now.    2. Acute Hypoxic Respiratory Failure: secondary to septic/ cardiogenic shock, PNA, continue on lung protective mechanical ventilation for now, will wean FIO2 for sats greater then 92%,  tx nebs, pulmonary toilet, continue supportive care monitor for now. Check SAT and SBT.    3. Mixed Cardiogenic/Septic Shock: secondary to PNA, will continue empiric abx as per ID, on levophed gtt will wean for maps greater then 65, continue monitor for now.    4. Acute Biventricular CHF EF 38% in setting of Severe Aortic stenosis and Mod-Severe MR: continue supportive care will need eventual BiVICD by EP, monitor for now.    5. PNA: s/p course of abx as per ID, continue pulmonary toilet tx nebs, monitor for now,    6. VFIB: tx amio gtt, most likley ischemic in nature. F/u Cardiology recs.    7. B/l Rib Fractures: from fall continue pain control.    8. Right Humeral Fracture: continue supportive care as per Ortho, monitor for now.    9. Right Intertrochanteric Fracture: continue supportive care as per Ortho, monitor for now. Surgery cancelled due to afin RVR    10. Odontoid Fracture: continue nec collar as per Neurology f/u Recs.    11. Seizures: tx AED's as per Neurology monitor for now.    12. Anemia: secondary to hip fracture and AOC transfuse as needed for less then 7, continue supportive care, monitor for now.    13. Sacral Decubitus/ Abscess s/p I/D, f/u surgery recs.    14. CAD s/p CABG: tx ASA, continue supportive care, monitor for now. Awaiting cath today evaluate for ischemia discussed case with cardiology at bedside    15. Bladder Prolapse with Chronic Csatellon: continue Castellon monitor for now.    16. DM: tx insulin sliding scale, monitor BSG's, continue supportive care.    17. ESRD on HD: continue renal replacement therapy as per nephrology, monitor for now.    18. DVT/ GI px: tx PPI/ hep subcut. lower ext US neg for DVT.    19. NUT: tx IVF's resume TF's. after cath lab.    20. Afib: tx amio gtt, continue supportive care, monitor for now.        Critical Care Time not including procedures 32 mins    Discussed case with  family

## 2021-04-09 NOTE — CHART NOTE - NSCHARTNOTEFT_GEN_A_CORE
PRE-OP DIAGNOSIS: s/p cardiac arrest, intubated, hx of CAD s/p CABG 2019, HTN, DL    PROCEDURE: [facility] with coronary angiography    Physician: Dr Crisostomo  Assistant: Chey Gonzalez    ANESTHESIA TYPE:  [  ]General Anesthesia  [  ] Sedation  [ x ] Local/Regional    ESTIMATED BLOOD LOSS:    10   mL    CONDITION  [  ] Critical  [  ] Serious  [  ]Fair  [x] Good      SPECIMENS REMOVED (IF APPLICABLE): N/A      IV CONTRAST:    60    mL      IMPLANTS (IF APPLICABLE)      FINDINGS      LEFT HEART CATHETERIZATION                                    Left main  ostial/prox 80-90% lesion    LAD: Prox mild disease, Mid moderate diffuse disease,  distal supplied by patent LIMA                        Diag: mild disease    Left Circumflex: Prox moderate disease   OM: supplied by patent SVG    Right Coronary Artery: Prox mild disease, Mid 90 % diffuse lesion distal: moderate disease  RPDA supplied by patent SVG    Patent BUCKLEY by LAD  Patent SVG to OM  Patent SVG to RPDA    DOMINANCE: Right    ACCESS: Right femoral   CLOSURE: manual    INTERVENTION  IMPLANTS:  none    POST-OP DIAGNOSIS  Patent LIMA and SVG grafts,   Transaortic gradient ~ 10-15mmHg        PLAN OF CARE    [ x] Return to In-patient bed  continue mechanical ventilation and current management as per primary team.   maintain electrolytes within normal ranges    Results of procedure/ plan of care discussed with patient/  in detail.

## 2021-04-10 NOTE — PROGRESS NOTE ADULT - SUBJECTIVE AND OBJECTIVE BOX
NEPHROLOGY FOLLOW UP NOTE  ---------------------------------------    pt seen in icu    PAST MEDICAL & SURGICAL HISTORY:  Diabetes    Congestive heart failure (CHF)    Pleural effusion due to congestive heart failure    End stage renal disease  on dialysis Mon, Wed, Fr    Uterine prolapse    Chronic indwelling Castellon catheter    History of repair of hip fracture  2019    S/P CABG (coronary artery bypass graft)  11/2019    History of thoracentesis    Chronic indwelling Castellon catheter      Allergies:  No Known Allergies    Home Medications Reviewed    SOCIAL HISTORY:  Denies ETOH,Smoking,   FAMILY HISTORY:  FH: stomach cancer (Mother)    FH: myocardial infarction (Mother)          REVIEW OF SYSTEMS:  unobtainable    PHYSICAL EXAM:  Constitutional: NAD  HEENT: ETT  Neck: No JVD  Respiratory: b/l BS  Cardiovascular: S1, S2, RRR  Gastrointestinal: BS+, soft, NT/ND  Extremities: No cyanosis or clubbing. No peripheral edema  Neurological: poorly responsive  : No CVA tenderness   Skin: No rashes  Vascular Access: [facility]    Hospital Medications:   MEDICATIONS  (STANDING):  aMIOdarone    Tablet 200 milliGRAM(s) Oral two times a day  aspirin  chewable 81 milliGRAM(s) Oral daily  atorvastatin Oral Tab/Cap - Peds 40 milliGRAM(s) Oral daily  BACItracin   Ointment 1 Application(s) Topical two times a day  chlorhexidine 0.12% Liquid 15 milliLiter(s) Oral Mucosa every 12 hours  chlorhexidine 4% Liquid 1 Application(s) Topical <User Schedule>  dexMEDEtomidine Infusion 0.2 MICROgram(s)/kG/Hr (3.3 mL/Hr) IV Continuous <Continuous>  doxercalciferol Injectable 2 MICROGram(s) IV Push <User Schedule>  epoetin mana-epbx (RETACRIT) Injectable 57739 Unit(s) IV Push <User Schedule>  fentaNYL   Infusion. 0.5 MICROgram(s)/kG/Hr (2.9 mL/Hr) IV Continuous <Continuous>  heparin   Injectable 5000 Unit(s) SubCutaneous every 8 hours  levETIRAcetam  IVPB 375 milliGRAM(s) IV Intermittent every 12 hours  levETIRAcetam  IVPB 250 milliGRAM(s) IV Intermittent <User Schedule>  pantoprazole  Injectable 40 milliGRAM(s) IV Push daily  phenylephrine    Infusion 0.1 MICROgram(s)/kG/Min (1.09 mL/Hr) IV Continuous <Continuous>  polyethylene glycol 3350 17 Gram(s) Oral at bedtime  valproic  acid Syrup 250 milliGRAM(s) Oral two times a day        VITALS:  T(F): 99 (04-10-21 @ 04:00), Max: 99.3 (04-09-21 @ 12:00)  HR: 124 (04-10-21 @ 10:00)  BP: --  RR: 12 (04-10-21 @ 10:00)  SpO2: 100% (04-10-21 @ 10:00)  Wt(kg): --    04-08 @ 07:01  -  04-09 @ 07:00  --------------------------------------------------------  IN: 1201.5 mL / OUT: 1680 mL / NET: -478.5 mL    04-09 @ 07:01  -  04-10 @ 07:00  --------------------------------------------------------  IN: 1763.3 mL / OUT: 20 mL / NET: 1743.3 mL          LABS:  04-10    140  |  103  |  30<H>  ----------------------------<  138<H>  4.6   |  24  |  3.7<H>    Ca    8.5      10 Apr 2021 04:11  Mg     1.9     04-10    TPro  5.3<L>  /  Alb  2.8<L>  /  TBili  0.3  /  DBili      /  AST  10  /  ALT  <5  /  AlkPhos  144<H>  04-10                          8.0    5.45  )-----------( 159      ( 10 Apr 2021 04:11 )             26.2       Urine Studies:        RADIOLOGY & ADDITIONAL STUDIES:

## 2021-04-10 NOTE — PROGRESS NOTE ADULT - ATTENDING COMMENTS
Patient seen, case reviewed and d/w CCU team on rounds.  Patient with h/o CAD, ischemic cardiomyopathy.  Patient presented with cardiac arrest, resuscitated successfully.  Right hip fracture.  Patent grafts.  Sedation held.  ICD placement scheduled next week, likely on Tuesday.  Will need hip surgery after that.

## 2021-04-10 NOTE — PROGRESS NOTE ADULT - SUBJECTIVE AND OBJECTIVE BOX
SUBJECTIVE:    Patient is a 70y old Female who presents with a chief complaint of cardiac arrest (08 Apr 2021 11:05)    Currently admitted to medicine with the primary diagnosis of Cardiopulmonary arrest complicated by C spine fracture and hip fracture.  s/p cath 4/9/21     Today is hospital day 9d. This morning she remains intubated and sedated. no events    PAST MEDICAL & SURGICAL HISTORY  Diabetes    Congestive heart failure (CHF)    Pleural effusion due to congestive heart failure    End stage renal disease  on dialysis Mon, Wed, Fr    Uterine prolapse    Chronic indwelling Castellon catheter    History of repair of hip fracture  2019    S/P CABG (coronary artery bypass graft)  11/2019    History of thoracentesis    Chronic indwelling Castellon catheter      SOCIAL HISTORY:  Negative for smoking/alcohol/drug use.     ALLERGIES:  No Known Allergies    MEDICATIONS:  STANDING MEDICATIONS  aMIOdarone    Tablet 200 milliGRAM(s) Oral two times a day  aspirin  chewable 81 milliGRAM(s) Oral daily  atorvastatin Oral Tab/Cap - Peds 40 milliGRAM(s) Oral daily  BACItracin   Ointment 1 Application(s) Topical two times a day  chlorhexidine 0.12% Liquid 15 milliLiter(s) Oral Mucosa every 12 hours  chlorhexidine 4% Liquid 1 Application(s) Topical <User Schedule>  dexMEDEtomidine Infusion 0.2 MICROgram(s)/kG/Hr IV Continuous <Continuous>  doxercalciferol Injectable 2 MICROGram(s) IV Push <User Schedule>  epoetin mana-epbx (RETACRIT) Injectable 41898 Unit(s) IV Push <User Schedule>  fentaNYL   Infusion. 0.5 MICROgram(s)/kG/Hr IV Continuous <Continuous>  heparin   Injectable 5000 Unit(s) SubCutaneous every 8 hours  levETIRAcetam  IVPB 375 milliGRAM(s) IV Intermittent every 12 hours  levETIRAcetam  IVPB 250 milliGRAM(s) IV Intermittent <User Schedule>  pantoprazole  Injectable 40 milliGRAM(s) IV Push daily  phenylephrine    Infusion 0.1 MICROgram(s)/kG/Min IV Continuous <Continuous>  polyethylene glycol 3350 17 Gram(s) Oral at bedtime  valproic  acid Syrup 250 milliGRAM(s) Oral two times a day    PRN MEDICATIONS    VITALS:   T(F): 99  HR: 124  BP: --  RR: 12  SpO2: 100%    LABS:                        8.0    5.45  )-----------( 159      ( 10 Apr 2021 04:11 )             26.2     04-10    140  |  103  |  30<H>  ----------------------------<  138<H>  4.6   |  24  |  3.7<H>    Ca    8.5      10 Apr 2021 04:11  Mg     1.9     04-10    TPro  5.3<L>  /  Alb  2.8<L>  /  TBili  0.3  /  DBili  x   /  AST  10  /  ALT  <5  /  AlkPhos  144<H>  04-10    PT/INR - ( 10 Apr 2021 04:11 )   PT: 15.00 sec;   INR: 1.30 ratio         PTT - ( 10 Apr 2021 04:11 )  PTT:31.6 sec    ABG - ( 10 Apr 2021 03:55 )  pH, Arterial: 7.32  pH, Blood: x     /  pCO2: 52    /  pO2: 93    / HCO3: 27    / Base Excess: x     /  SaO2: 97          PHYSICAL EXAM:  GEN: No acute distress  LUNGS: bilateral ronchi  HEART: S1/S2 present. RRR.   ABD: Soft, non-tender, non-distended. Bowel sounds present  EXT: (+) edema  NEURO: sedated

## 2021-04-10 NOTE — PROGRESS NOTE ADULT - SUBJECTIVE AND OBJECTIVE BOX
Patient is a 70y old  Female who presents with a chief complaint of hip fracture (08 Apr 2021 11:05)        HPI:  69 yo F with PMH of CAD s/p CABG (November 2019), HFrEF (EF 20%), AS, ESRD (MWF), bladder prolapse requiring chronic Castellon, Diabetes Type 2 on insulin presents from home s/p mechanical fall. When the history was taken in the emergency department, she The denied any dizziness/ palpitations/ aura/vertigo. She reported non-radiating R shoulder pain and non-radiating R hip pain on arrival. She was found to have R humeral fracture / R hip fracture / non displaced odontoid fracture/ acute rib fractures/ ? T6 vertebral fracture.     In the emergency department, while the patient was receiving trauma work up she was found pulseless by a PCA in the room ( she was not on the monitor at that time) and had a cardiac arrest. ROSC was achieved after 2 minutes of CPR (1 epinephrine was given and 1 calcium chloride iv). Patient was intubated and sedated. EKG showed RBBB with wide complex tachycardia. Per family at the bedside, the patient had her last hemodialysis yesterday and has been relatively non compliant with her medications and medical care at home. Unable to obtain ros as patient is sedated.     T(C): 35 , HR: 60, BP: 158/67, RR: 20, SpO2: 99% vented  Labs: d-dimer ~ 4700, p-bnp 70,000, trop (0.20 <--0.17)  ABG pH, Arterial: 7.45, CO2: 36, Arterial O2: 63 , HCO3: 25 , Base Excess: 1.4 , SaO2: 94      CTH (-)                (01 Apr 2021 07:42)      Pt evaluated on AM rounds.  I reviewed the radiology tests and hospital record prior to visiting the patient.    Interval Events: No overnight events.    REVIEW OF SYSTEMS:   see HPI      OBJECTIVE:  ICU Vital Signs Last 24 Hrs  T(C): 37.2 (10 Apr 2021 04:00), Max: 37.4 (09 Apr 2021 12:00)  T(F): 99 (10 Apr 2021 04:00), Max: 99.3 (09 Apr 2021 12:00)  HR: 92 (10 Apr 2021 07:00) (84 - 98)  BP: --  BP(mean): --  ABP: 106/54 (10 Apr 2021 07:00) (90/50 - 130/56)  ABP(mean): 72 (10 Apr 2021 07:00) (64 - 82)  RR: 12 (10 Apr 2021 07:00) (12 - 22)  SpO2: 100% (10 Apr 2021 07:00) (97% - 100%)    Mode: AC/ CMV (Assist Control/ Continuous Mandatory Ventilation), RR (machine): 12, TV (machine): 350, FiO2: 30, PEEP: 5, ITime: 1, MAP: 9, PIP: 28    04-09 @ 07:01  -  04-10 @ 07:00  --------------------------------------------------------  IN: 1763.3 mL / OUT: 20 mL / NET: 1743.3 mL      CAPILLARY BLOOD GLUCOSE      POCT Blood Glucose.: 159 mg/dL (09 Apr 2021 23:46)        PHYSICAL EXAM:     · CONSTITUTIONAL:   not septic appearing,   well nourished,   NAD    · ENMT:   Airway patent,   Nasal mucosa clear.  Mouth with normal mucosa.   No thrush    · EYES:   Clear bilaterally,   pupils equal,   round and reactive to light.    · CARDIAC:   Normal rate,   regular rhythm.    Heart sounds S1, S2.   No murmurs, no rubs or gallops on auscultation  no edema        CAROTID:   normal systolic impulse  no bruits    · RESPIRATORY:   rales  normal chest expansion  no retractions or use of accessory muscles  palpation of chest is normal with no fremitus  percussion of chest demonstrates no hyperresonance or dullness    · GASTROINTESTINAL:  Abdomen soft,   non-tender,   + BS  liver/spleen not palpable    · MUSCULOSKELETAL:   no clubbing, cyanosis        · SKIN:   Skin normal color for race,   warm, dry   No evidence of rash.        · HEME LYMPH:   no splenomegaly.  No cervical  lymphadenopathy.  no inguinal lymphadenopathy    HOSPITAL MEDICATIONS:  MEDICATIONS  (STANDING):  aMIOdarone    Tablet 200 milliGRAM(s) Oral two times a day  aspirin  chewable 81 milliGRAM(s) Oral daily  atorvastatin Oral Tab/Cap - Peds 40 milliGRAM(s) Oral daily  BACItracin   Ointment 1 Application(s) Topical two times a day  chlorhexidine 0.12% Liquid 15 milliLiter(s) Oral Mucosa every 12 hours  chlorhexidine 4% Liquid 1 Application(s) Topical <User Schedule>  dexMEDEtomidine Infusion 0.2 MICROgram(s)/kG/Hr (3.3 mL/Hr) IV Continuous <Continuous>  doxercalciferol Injectable 2 MICROGram(s) IV Push <User Schedule>  epoetin mana-epbx (RETACRIT) Injectable 95346 Unit(s) IV Push <User Schedule>  fentaNYL   Infusion. 0.5 MICROgram(s)/kG/Hr (2.9 mL/Hr) IV Continuous <Continuous>  heparin   Injectable 5000 Unit(s) SubCutaneous every 8 hours  levETIRAcetam  IVPB 375 milliGRAM(s) IV Intermittent every 12 hours  levETIRAcetam  IVPB 250 milliGRAM(s) IV Intermittent <User Schedule>  norepinephrine Infusion 0.05 MICROgram(s)/kG/Min (5.44 mL/Hr) IV Continuous <Continuous>  pantoprazole  Injectable 40 milliGRAM(s) IV Push daily  polyethylene glycol 3350 17 Gram(s) Oral at bedtime  valproic  acid Syrup 250 milliGRAM(s) Oral two times a day    MEDICATIONS  (PRN):    lactated ringers Bolus:   1000 milliLiter(s), IV Bolus, once, infuse over 30 Minute(s), Stop After 1 Doses  Provider's Contact #: [phone]      LABS:                        8.0    5.45  )-----------( 159      ( 10 Apr 2021 04:11 )             26.2     04-10    140  |  103  |  30<H>  ----------------------------<  138<H>  4.6   |  24  |  3.7<H>    Ca    8.5      10 Apr 2021 04:11  Mg     1.9     04-10    TPro  5.3<L>  /  Alb  2.8<L>  /  TBili  0.3  /  DBili  x   /  AST  10  /  ALT  <5  /  AlkPhos  144<H>  04-10    PT/INR - ( 10 Apr 2021 04:11 )   PT: 15.00 sec;   INR: 1.30 ratio         PTT - ( 10 Apr 2021 04:11 )  PTT:31.6 sec    Arterial Blood Gas:  04-10 @ 03:55  7.32/52/93/27/97/--  ABG lactate: --  Arterial Blood Gas:  04-09 @ 09:10  7.37/47/79/27/96/1.2  ABG lactate: --  Arterial Blood Gas:  04-09 @ 03:50  7.36/50/93/28/98/2.2  ABG lactate: --            Mode: AC/ CMV (Assist Control/ Continuous Mandatory Ventilation)  RR (machine): 12  TV (machine): 350  FiO2: 30  PEEP: 5  ITime: 1  MAP: 9  PIP: 28      ABG - ( 10 Apr 2021 03:55 )  pH, Arterial: 7.32  pH, Blood: x     /  pCO2: 52    /  pO2: 93    / HCO3: 27    / Base Excess: x     /  SaO2: 97                  RADIOLOGY: Today I personally reviewed latest CXR and other pertinent films.

## 2021-04-10 NOTE — CHART NOTE - NSCHARTNOTEFT_GEN_A_CORE
RK: Urology came to assess montejo and bladder prolapse. They irrigated the bladder, assessed the montejo and deemded the montejo to be fully functional. They stated that montejo can be in for up to 6 months at a time without needing a change.

## 2021-04-10 NOTE — PROGRESS NOTE ADULT - ASSESSMENT
ESRD on HD TTS  cardiac arrest  ARF s/p intubation  PNA  r hip fx/ r humerus fx / odontoid fx  shock  CHF  vfib  cad / CABG / s/p cardiac cath  anemia  seizure    plan:    CCU care  HD today  TDC care  pressor suppoort  vent support  for AICD  OR for ortho  d/w cardio  d/w Hd nursing

## 2021-04-10 NOTE — PROGRESS NOTE ADULT - ASSESSMENT
IMPRESSION:    RT Humerus/Rt femur fracture/Rt Hip fracture   Cardiac arrest in the setting of arrythmia and CHF exacerbation   Seizure unknown etiology  Aspiration Pneumonia vs Adenovirus Pneumonia   Perirectal abscess  ESRD  Odontoid fracture   h/o DM       SUGGEST:     Ortho following   weaning Norepinephrine   Keppra 500mg adminstered. followed by 375mg BID with additional 250mg dose after dialysis   Depakote 250mg PO BID   finish  Unasyn 3 gm iv q24h,   surgery is following patient. Dr Mota   Neurosurgery following patient   Nephro following patient RRT oer renal   fingersticks tid with insulin protocol if > 180    # DVT PPX: heparin subq  # GI PPX: PPI  # Fluids : D5 LR @ 50  # Diet: restart diet  # Activity: bedrest  # Bowel regimen   IMPRESSION:    RT Humerus/Rt femur fracture/Rt Hip fracture   Cardiac arrest in the setting of arrythmia and CHF exacerbation   Seizure unknown etiology  Aspiration Pneumonia vs Adenovirus Pneumonia   Perirectal abscess  ESRD  Odontoid fracture   h/o DM       SUGGEST:    Ortho following possible OR next week  weaning Norepinephrine   Keppra  375mg BID with additional 250mg dose after dialysis  Depakote 250mg PO BID   finish  Unasyn 3 gm iv q24h,   surgery is following patient. Dr Mota   Neurosurgery following patient   Nephro following patient RRT oer renal   fingersticks tid with insulin protocol if > 180  increase MVe to keep PaCO2 about 40  CPAP trial /SIMV but no extubation    # DVT PPX: heparin subq  # GI PPX: PPI  # Fluids : D5 LR @ 50  # Diet: restart diet  # Activity: bedrest  # Bowel regimen

## 2021-04-11 NOTE — PROGRESS NOTE ADULT - ASSESSMENT
IMPRESSION:    RT Humerus/Rt femur fracture/Rt Hip fracture   Cardiac arrest in the setting of arrythmia and CHF exacerbation   Seizure unknown etiology  Aspiration Pneumonia vs Adenovirus Pneumonia   Perirectal abscess  ESRD  Odontoid fracture   h/o DM       SUGGEST:  keep comfortable precedex/fentanyl  Ortho/ EP following OR next week  weaning Norepinephrine   Keppra  375mg BID with additional 250mg dose after dialysis  Depakote 250mg PO BID   finish  Unasyn 3 gm iv q24h,   surgery is following patient. Dr Mota   Neurosurgery following patient   Nephro following patient RRT per renal   fingersticks tid with insulin protocol if > 180  increase MVe to keep PaCO2 about 40  CPAP trial /SIMV but no extubation given hard collar and odontoid fx. Going back to OR nest week    # DVT PPX: heparin subq  # GI PPX: PPI  # Fluids : D5 LR @ 50  # Diet: restart diet  # Activity: bedrest  # Bowel regimen

## 2021-04-11 NOTE — CHART NOTE - NSCHARTNOTEFT_GEN_A_CORE
Received preliminary read for LUE ultrasound that showed L brachial DVT. As per family, they were concerned for blood clot prior to admission, especially since her AVF stopped working. She was supposed to follow with Dr. Mota to examine the cause but could not due to her mechanical fall and subsequent hospitalization. Spoke to the patient's daughter, Ely [phone], to discuss the risks and benefits of heparin, and agreed to start the heparin gtt.   - Follow up baseline PTT and start Heparin gtt. Received preliminary read for LUE ultrasound that showed L brachial DVT. As per family, they were concerned for blood clot prior to admission, especially since her AVF stopped working. She was supposed to follow with Dr. Mota to examine the cause but could not due to her mechanical fall and subsequent hospitalization. Spoke to the patient's daughter, Ely [phone], to discuss the risks and benefits of heparin, and agreed to start the heparin gtt.   - Follow up baseline PTT and start Heparin gtt.  - Hold Heparin in AM for possible AICD placement. Follow up with EP in AM.

## 2021-04-11 NOTE — PROGRESS NOTE ADULT - SUBJECTIVE AND OBJECTIVE BOX
NEPHROLOGY FOLLOW UP NOTE  ---------------------------------------    pt seen in icu  remains vented  on pressors  poorly responsive off sedation  HD yest    PAST MEDICAL & SURGICAL HISTORY:  Diabetes    Congestive heart failure (CHF)    Pleural effusion due to congestive heart failure    End stage renal disease  on dialysis Mon, Wed, Fr    Uterine prolapse    Chronic indwelling Castellon catheter    History of repair of hip fracture  2019    S/P CABG (coronary artery bypass graft)  11/2019    History of thoracentesis    Chronic indwelling Castellon catheter      Allergies:  No Known Allergies    Home Medications Reviewed    SOCIAL HISTORY:  Denies ETOH,Smoking,   FAMILY HISTORY:  FH: stomach cancer (Mother)    FH: myocardial infarction (Mother)          REVIEW OF SYSTEMS:  unobtainable    PHYSICAL EXAM:  Constitutional: NAD  HEENT: ETT  Neck: No JVD  Respiratory: b/l BS  Cardiovascular: S1, S2, RRR  Gastrointestinal: BS+, soft, NT/ND  Extremities: No cyanosis or clubbing. No peripheral edema  Neurological: poorly responsive  : No CVA tenderness   Skin: No rashes  Vascular Access: TDC    Hospital Medications:   MEDICATIONS  (STANDING):  aMIOdarone    Tablet 200 milliGRAM(s) Oral two times a day  aspirin  chewable 81 milliGRAM(s) Oral daily  atorvastatin Oral Tab/Cap - Peds 40 milliGRAM(s) Oral daily  BACItracin   Ointment 1 Application(s) Topical two times a day  chlorhexidine 0.12% Liquid 15 milliLiter(s) Oral Mucosa every 12 hours  chlorhexidine 4% Liquid 1 Application(s) Topical <User Schedule>  dexMEDEtomidine Infusion 0.2 MICROgram(s)/kG/Hr (3.3 mL/Hr) IV Continuous <Continuous>  doxercalciferol Injectable 2 MICROGram(s) IV Push <User Schedule>  epoetin mana-epbx (RETACRIT) Injectable 62089 Unit(s) IV Push <User Schedule>  fentaNYL   Infusion. 0.5 MICROgram(s)/kG/Hr (2.9 mL/Hr) IV Continuous <Continuous>  heparin   Injectable 5000 Unit(s) SubCutaneous every 8 hours  levETIRAcetam  IVPB 375 milliGRAM(s) IV Intermittent every 12 hours  levETIRAcetam  IVPB 250 milliGRAM(s) IV Intermittent <User Schedule>  pantoprazole  Injectable 40 milliGRAM(s) IV Push daily  phenylephrine    Infusion 0.1 MICROgram(s)/kG/Min (1.09 mL/Hr) IV Continuous <Continuous>  polyethylene glycol 3350 17 Gram(s) Oral at bedtime  valproic  acid Syrup 250 milliGRAM(s) Oral two times a day        VITALS:  T(F): 97.8 (04-11-21 @ 04:00), Max: 98.4 (04-10-21 @ 16:00)  HR: 92 (04-11-21 @ 12:00)  BP: 79/50 (04-11-21 @ 08:00)  RR: 12 (04-10-21 @ 18:00)  SpO2: 91% (04-11-21 @ 12:00)  Wt(kg): --    04-09 @ 07:01  -  04-10 @ 07:00  --------------------------------------------------------  IN: 1763.3 mL / OUT: 20 mL / NET: 1743.3 mL    04-10 @ 07:01  -  04-11 @ 07:00  --------------------------------------------------------  IN: 1851.3 mL / OUT: 2025 mL / NET: -173.7 mL    04-11 @ 07:01  -  04-11 @ 13:21  --------------------------------------------------------  IN: 76.1 mL / OUT: 0 mL / NET: 76.1 mL          LABS:  04-11    138  |  100  |  26<H>  ----------------------------<  154<H>  4.5   |  25  |  3.1<H>    Ca    9.1      11 Apr 2021 04:30  Mg     2.0     04-11    TPro  5.9<L>  /  Alb  2.8<L>  /  TBili  0.3  /  DBili      /  AST  18  /  ALT  <5  /  AlkPhos  193<H>  04-11                          9.2    6.81  )-----------( 191      ( 11 Apr 2021 04:30 )             30.1       Urine Studies:        RADIOLOGY & ADDITIONAL STUDIES:

## 2021-04-11 NOTE — PROGRESS NOTE ADULT - ASSESSMENT
ESRD on HD TTS  cardiac arrest  ARF s/p intubation  PNA  r hip fx/ r humerus fx / odontoid fx  shock  CHF  vfib  cad / CABG / s/p cardiac cath  anemia  seizure    plan:    CCU care  HD next on Tuesday  wean pressors  vent support  neuro monitoring off sedation  for AICD this week  OR by ortho this week  d/w nursing

## 2021-04-11 NOTE — PROGRESS NOTE ADULT - SUBJECTIVE AND OBJECTIVE BOX
INTERVAL HPI/OVERNIGHT EVENTS:  still on Jose D, slowing titrating off  cleared by neuro for OR  no events on tele  was cancelled from ortho OR due to AF RVR    MEDICATIONS  (STANDING):  aMIOdarone    Tablet 200 milliGRAM(s) Oral two times a day  aspirin  chewable 81 milliGRAM(s) Oral daily  atorvastatin Oral Tab/Cap - Peds 40 milliGRAM(s) Oral daily  BACItracin   Ointment 1 Application(s) Topical two times a day  chlorhexidine 0.12% Liquid 15 milliLiter(s) Oral Mucosa every 12 hours  chlorhexidine 4% Liquid 1 Application(s) Topical <User Schedule>  dexMEDEtomidine Infusion 0.2 MICROgram(s)/kG/Hr (3.3 mL/Hr) IV Continuous <Continuous>  doxercalciferol Injectable 2 MICROGram(s) IV Push <User Schedule>  epoetin mana-epbx (RETACRIT) Injectable 88575 Unit(s) IV Push <User Schedule>  fentaNYL   Infusion. 0.5 MICROgram(s)/kG/Hr (2.9 mL/Hr) IV Continuous <Continuous>  heparin   Injectable 5000 Unit(s) SubCutaneous every 8 hours  levETIRAcetam  IVPB 375 milliGRAM(s) IV Intermittent every 12 hours  levETIRAcetam  IVPB 250 milliGRAM(s) IV Intermittent <User Schedule>  metoclopramide Injectable 10 milliGRAM(s) IV Push once  pantoprazole  Injectable 40 milliGRAM(s) IV Push daily  phenylephrine    Infusion 0.1 MICROgram(s)/kG/Min (1.09 mL/Hr) IV Continuous <Continuous>  polyethylene glycol 3350 17 Gram(s) Oral at bedtime  valproic  acid Syrup 250 milliGRAM(s) Oral two times a day    MEDICATIONS  (PRN):      Allergies    No Known Allergies    Intolerances        REVIEW OF SYSTEMS    [ ] A ten-point review of systems was otherwise negative except as noted.  [x ] Due to altered mental status/intubation, subjective information were not able to be obtained from the patient. History was obtained, to the extent possible, from review of the chart and collateral sources of information.      Vital Signs Last 24 Hrs  T(C): 36.6 (11 Apr 2021 04:00), Max: 37.2 (10 Apr 2021 12:00)  T(F): 97.8 (11 Apr 2021 04:00), Max: 98.9 (10 Apr 2021 12:00)  HR: 86 (11 Apr 2021 10:00) (86 - 100)  BP: 79/50 (11 Apr 2021 08:00) (79/50 - 79/50)  BP(mean): 62 (11 Apr 2021 08:00) (62 - 62)  RR: 12 (10 Apr 2021 18:00) (12 - 12)  SpO2: 98% (11 Apr 2021 10:00) (94% - 100%)    04-10-21 @ 07:01  -  04-11-21 @ 07:00  --------------------------------------------------------  IN: 1851.3 mL / OUT: 2025 mL / NET: -173.7 mL        PHYSICAL EXAM:    GENERAL: In no apparent distress, well nourished, and hydrated.  HEART: Regular, bradycardia; + murmur  PULMONARY: Intubated, rhonchi (anterior)  ABDOMEN: Soft, Nontender, Nondistended; Bowel sounds present  EXTREMITIES:  2+ Peripheral Pulses, No clubbing, cyanosis, or edema  NEUROLOGICAL: Sedated, opens eyes to voice, follows simple commands    LABS:                        9.2    6.81  )-----------( 191      ( 11 Apr 2021 04:30 )             30.1     04-11    138  |  100  |  26<H>  ----------------------------<  154<H>  4.5   |  25  |  3.1<H>    Ca    9.1      11 Apr 2021 04:30  Mg     2.0     04-11    TPro  5.9<L>  /  Alb  2.8<L>  /  TBili  0.3  /  DBili  x   /  AST  18  /  ALT  <5  /  AlkPhos  193<H>  04-11    PT/INR - ( 11 Apr 2021 04:30 )   PT: 14.70 sec;   INR: 1.28 ratio         PTT - ( 11 Apr 2021 04:30 )  PTT:31.2 sec          RADIOLOGY & ADDITIONAL TESTS:       INTERVAL HPI/OVERNIGHT EVENTS:  still on Jose D, slowing titrating off  cleared by neuro for OR  no events on tele  was cancelled from ortho OR due to AF RVR    MEDICATIONS  (STANDING):  aMIOdarone    Tablet 200 milliGRAM(s) Oral two times a day  aspirin  chewable 81 milliGRAM(s) Oral daily  atorvastatin Oral Tab/Cap - Peds 40 milliGRAM(s) Oral daily  BACItracin   Ointment 1 Application(s) Topical two times a day  chlorhexidine 0.12% Liquid 15 milliLiter(s) Oral Mucosa every 12 hours  chlorhexidine 4% Liquid 1 Application(s) Topical <User Schedule>  dexMEDEtomidine Infusion 0.2 MICROgram(s)/kG/Hr (3.3 mL/Hr) IV Continuous <Continuous>  doxercalciferol Injectable 2 MICROGram(s) IV Push <User Schedule>  epoetin mana-epbx (RETACRIT) Injectable 54967 Unit(s) IV Push <User Schedule>  fentaNYL   Infusion. 0.5 MICROgram(s)/kG/Hr (2.9 mL/Hr) IV Continuous <Continuous>  heparin   Injectable 5000 Unit(s) SubCutaneous every 8 hours  levETIRAcetam  IVPB 375 milliGRAM(s) IV Intermittent every 12 hours  levETIRAcetam  IVPB 250 milliGRAM(s) IV Intermittent <User Schedule>  metoclopramide Injectable 10 milliGRAM(s) IV Push once  pantoprazole  Injectable 40 milliGRAM(s) IV Push daily  phenylephrine    Infusion 0.1 MICROgram(s)/kG/Min (1.09 mL/Hr) IV Continuous <Continuous>  polyethylene glycol 3350 17 Gram(s) Oral at bedtime  valproic  acid Syrup 250 milliGRAM(s) Oral two times a day    MEDICATIONS  (PRN):      Allergies    No Known Allergies    Intolerances        REVIEW OF SYSTEMS    [ ] A ten-point review of systems was otherwise negative except as noted.  [x ] Due to altered mental status/intubation, subjective information were not able to be obtained from the patient. History was obtained, to the extent possible, from review of the chart and collateral sources of information.      Vital Signs Last 24 Hrs  T(C): 36.6 (11 Apr 2021 04:00), Max: 37.2 (10 Apr 2021 12:00)  T(F): 97.8 (11 Apr 2021 04:00), Max: 98.9 (10 Apr 2021 12:00)  HR: 86 (11 Apr 2021 10:00) (86 - 100)  BP: 79/50 (11 Apr 2021 08:00) (79/50 - 79/50)  BP(mean): 62 (11 Apr 2021 08:00) (62 - 62)  RR: 12 (10 Apr 2021 18:00) (12 - 12)  SpO2: 98% (11 Apr 2021 10:00) (94% - 100%)    04-10-21 @ 07:01  -  04-11-21 @ 07:00  --------------------------------------------------------  IN: 1851.3 mL / OUT: 2025 mL / NET: -173.7 mL        PHYSICAL EXAM:  GENERAL: In no apparent distress, well nourished, and hydrated.  HEART: Regular, bradycardia; + murmur  PULMONARY: Intubated, rhonchi (anterior)  ABDOMEN: Soft, Nontender, Nondistended; Bowel sounds present  EXTREMITIES:  2+ Peripheral Pulses, No clubbing, cyanosis, or edema  NEUROLOGICAL: Sedated, opens eyes to voice, follows simple commands    LABS:                        9.2    6.81  )-----------( 191      ( 11 Apr 2021 04:30 )             30.1     04-11    138  |  100  |  26<H>  ----------------------------<  154<H>  4.5   |  25  |  3.1<H>    Ca    9.1      11 Apr 2021 04:30  Mg     2.0     04-11    TPro  5.9<L>  /  Alb  2.8<L>  /  TBili  0.3  /  DBili  x   /  AST  18  /  ALT  <5  /  AlkPhos  193<H>  04-11    PT/INR - ( 11 Apr 2021 04:30 )   PT: 14.70 sec;   INR: 1.28 ratio         PTT - ( 11 Apr 2021 04:30 )  PTT:31.2 sec          RADIOLOGY & ADDITIONAL TESTS:

## 2021-04-11 NOTE — PROGRESS NOTE ADULT - TIME BILLING
The patient was seen and examined.  Patient is still intubated.  On sedation, and intubated, patient does withdraw all extremities to noxious stimuli.    Hard cervical collar is in place.    At present time, recommend maintenance of hard cervical collar given acute C2 fracture.  Patient is to maintain hard cervical collar immobilization for approximately 4-6 weeks.  At that time, patient should undergo follow-up CT scan to ensure that there is evidence of healing.  If there is, patient's hard cervical collar restrictions can be liberalized.   If patient's neurological examination changes, obtain repeat CT scan of the head/CT cervical spine.    *****If collar needs to be removed, for clinical procedures,  Maintain in-line stabilization at all times.
cardiac arrest
CHF

## 2021-04-11 NOTE — PROGRESS NOTE ADULT - ASSESSMENT
71 yo F with PMH of CAD s/p CABG (November 2019), HFrEF (EF 20%), AS, ESRD (MWF), bladder prolapse requiring chronic Castellon, Diabetes Type 2 on insulin presents from home s/p mechanical fall with Multiple traumas (R humeral fracture / R hip fracture / non displaced odontoid fracture/ acute rib fractures/ ? T6 vertebral fracture). Found to be pulseless in trauma bay (not on monitor) with ROSC after 2 min. Seizurelike activities when sedation held, confirmed on vEEG. Extubated 4/2, followed by arrhythmia AF vs VT was reintubated and cardioverted. Patient with episode of AF RVR prior to ORIF on 4/8, was cancelled.  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, RBBB, LAFB)  Hx of GI bleed  Acute decompensated CHF, Ischemic CMP  Anemia acute on chronic  CAD s/p CABG in 2019, NUBIA clip   Patent grafts by cath  Echo with Severe AS, Moderate to severe MR , EF 35%  Hx ESRD on HD  Hx of GI bleed        Con't tele  con't Amio PO 200mg q12h  restart Metoprolol when BP tolerates  weanoff Jose D  Anemia, work up for any source of bleeding  check iron studies  Pt not a candidate for AC d/t hx of GIB  will plan CRT-D this week, pending schedule availability   please keep NPO, no TF for Monday in case of the procedure  hold Heparin SQ in AM  please resend COVID PCR prior to procedure per department protocol     EP: Covering for Dr. Solo     71 yo F with PMH of CAD s/p CABG (November 2019), HFrEF (EF 20%), AS, ESRD (MWF), bladder prolapse requiring chronic Castellon, Diabetes Type 2 on insulin presents from home s/p mechanical fall with Multiple traumas (R humeral fracture / R hip fracture / non displaced odontoid fracture/ acute rib fractures/ ? T6 vertebral fracture). Found to be pulseless in trauma bay (not on monitor) with ROSC after 2 min. Seizurelike activities when sedation held, confirmed on vEEG. Extubated 4/2, followed by arrhythmia AF vs VT was reintubated and cardioverted. Patient with episode of AF RVR prior to ORIF on 4/8, was cancelled.  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, RBBB, LAFB)  Hx of GI bleed  Acute decompensated CHF, Ischemic CMP  Anemia acute on chronic  CAD s/p CABG in 2019, NUBIA clip   Patent grafts by cath  Echo with Severe AS, Moderate to severe MR , EF 35%  Hx ESRD on HD  Hx of GI bleed        Con't tele  con't Amio PO 200mg q12h  restart Metoprolol when BP tolerates  wean off Jose D  Anemia, work up for any source of bleeding  check iron studies  Pt not a candidate for AC d/t hx of GIB  will plan ICD this week, pending schedule availability   please keep NPO, no TF for Monday in case of procedure  hold Heparin SQ in AM  please resend COVID PCR prior to procedure per department protocol

## 2021-04-11 NOTE — PROGRESS NOTE ADULT - SUBJECTIVE AND OBJECTIVE BOX
Patient is a 70y old  Female who presents with a chief complaint of hip fracture (08 Apr 2021 11:05)        HPI:  71 yo F with PMH of CAD s/p CABG (November 2019), HFrEF (EF 20%), AS, ESRD (MWF), bladder prolapse requiring chronic Castellon, Diabetes Type 2 on insulin presents from home s/p mechanical fall. When the history was taken in the emergency department, she The denied any dizziness/ palpitations/ aura/vertigo. She reported non-radiating R shoulder pain and non-radiating R hip pain on arrival. She was found to have R humeral fracture / R hip fracture / non displaced odontoid fracture/ acute rib fractures/ ? T6 vertebral fracture.     In the emergency department, while the patient was receiving trauma work up she was found pulseless by a PCA in the room ( she was not on the monitor at that time) and had a cardiac arrest. ROSC was achieved after 2 minutes of CPR (1 epinephrine was given and 1 calcium chloride iv). Patient was intubated and sedated. EKG showed RBBB with wide complex tachycardia. Per family at the bedside, the patient had her last hemodialysis yesterday and has been relatively non compliant with her medications and medical care at home. Unable to obtain ros as patient is sedated.     T(C): 35 , HR: 60, BP: 158/67, RR: 20, SpO2: 99% vented  Labs: d-dimer ~ 4700, p-bnp 70,000, trop (0.20 <--0.17)  ABG pH, Arterial: 7.45, CO2: 36, Arterial O2: 63 , HCO3: 25 , Base Excess: 1.4 , SaO2: 94      CTH (-)                (01 Apr 2021 07:42)      Pt evaluated on AM rounds.  I reviewed the radiology tests and hospital record prior to visiting the patient.    Interval Events: No overnight events.    REVIEW OF SYSTEMS:   see HPI      OBJECTIVE:  ICU Vital Signs Last 24 Hrs  T(C): 36.6 (11 Apr 2021 04:00), Max: 37.2 (10 Apr 2021 12:00)  T(F): 97.8 (11 Apr 2021 04:00), Max: 98.9 (10 Apr 2021 12:00)  HR: 86 (11 Apr 2021 07:00) (86 - 124)  BP: --  BP(mean): --  ABP: 122/64 (11 Apr 2021 07:00) (84/48 - 162/84)  ABP(mean): 84 (11 Apr 2021 07:00) (62 - 114)  RR: 12 (10 Apr 2021 18:00) (12 - 12)  SpO2: 99% (11 Apr 2021 07:00) (94% - 100%)    Mode: AC/ CMV (Assist Control/ Continuous Mandatory Ventilation), RR (machine): 12, TV (machine): 350, FiO2: 30, PEEP: 5, ITime: 1, MAP: 8, PIP: 28    04-10 @ 07:01  -  04-11 @ 07:00  --------------------------------------------------------  IN: 1851.3 mL / OUT: 2025 mL / NET: -173.7 mL      CAPILLARY BLOOD GLUCOSE      POCT Blood Glucose.: 141 mg/dL (11 Apr 2021 05:02)        PHYSICAL EXAM:     · CONSTITUTIONAL:   not septic appearing,   well nourished,   NAD    · ENMT:   Airway patent,   Nasal mucosa clear.  Mouth with normal mucosa.   No thrush    · EYES:   Clear bilaterally,   pupils equal,   round and reactive to light.    · CARDIAC:   Normal rate,   regular rhythm.    Heart sounds S1, S2.   No murmurs, no rubs or gallops on auscultation  no edema        CAROTID:   normal systolic impulse  no bruits    · RESPIRATORY:   rales  normal chest expansion  no retractions or use of accessory muscles  palpation of chest is normal with no fremitus  percussion of chest demonstrates no hyperresonance or dullness    · GASTROINTESTINAL:  Abdomen soft,   non-tender,   + BS  liver/spleen not palpable    · MUSCULOSKELETAL:   no clubbing, cyanosis      · NEUROLOGICAL:   Unavailable as the patient is sedated.        · SKIN:   Skin normal color for race,   warm, dry   No evidence of rash.        · HEME LYMPH:   no splenomegaly.  No cervical  lymphadenopathy.  no inguinal lymphadenopathy    HOSPITAL MEDICATIONS:  MEDICATIONS  (STANDING):  aMIOdarone    Tablet 200 milliGRAM(s) Oral two times a day  aspirin  chewable 81 milliGRAM(s) Oral daily  atorvastatin Oral Tab/Cap - Peds 40 milliGRAM(s) Oral daily  BACItracin   Ointment 1 Application(s) Topical two times a day  chlorhexidine 0.12% Liquid 15 milliLiter(s) Oral Mucosa every 12 hours  chlorhexidine 4% Liquid 1 Application(s) Topical <User Schedule>  dexMEDEtomidine Infusion 0.2 MICROgram(s)/kG/Hr (3.3 mL/Hr) IV Continuous <Continuous>  doxercalciferol Injectable 2 MICROGram(s) IV Push <User Schedule>  epoetin mana-epbx (RETACRIT) Injectable 30847 Unit(s) IV Push <User Schedule>  fentaNYL   Infusion. 0.5 MICROgram(s)/kG/Hr (2.9 mL/Hr) IV Continuous <Continuous>  heparin   Injectable 5000 Unit(s) SubCutaneous every 8 hours  levETIRAcetam  IVPB 375 milliGRAM(s) IV Intermittent every 12 hours  levETIRAcetam  IVPB 250 milliGRAM(s) IV Intermittent <User Schedule>  pantoprazole  Injectable 40 milliGRAM(s) IV Push daily  phenylephrine    Infusion 0.1 MICROgram(s)/kG/Min (1.09 mL/Hr) IV Continuous <Continuous>  polyethylene glycol 3350 17 Gram(s) Oral at bedtime  valproic  acid Syrup 250 milliGRAM(s) Oral two times a day    MEDICATIONS  (PRN):    lactated ringers Bolus:   1000 milliLiter(s), IV Bolus, once, infuse over 30 Minute(s), Stop After 1 Doses  Provider's Contact #: [phone]      LABS:                        9.2    6.81  )-----------( 191      ( 11 Apr 2021 04:30 )             30.1     04-11    138  |  100  |  26<H>  ----------------------------<  154<H>  4.5   |  25  |  3.1<H>    Ca    9.1      11 Apr 2021 04:30  Mg     2.0     04-11    TPro  5.9<L>  /  Alb  2.8<L>  /  TBili  0.3  /  DBili  x   /  AST  18  /  ALT  <5  /  AlkPhos  193<H>  04-11    PT/INR - ( 11 Apr 2021 04:30 )   PT: 14.70 sec;   INR: 1.28 ratio         PTT - ( 11 Apr 2021 04:30 )  PTT:31.2 sec    Arterial Blood Gas:  04-11 @ 02:25  7.38/48/76/28/95/2.4  ABG lactate: --  Arterial Blood Gas:  04-10 @ 17:59  7.42/46/83/30/97/4.5  ABG lactate: --  Arterial Blood Gas:  04-10 @ 03:55  7.32/52/93/27/97/--  ABG lactate: --  Arterial Blood Gas:  04-09 @ 09:10  7.37/47/79/27/96/1.2  ABG lactate: --            Mode: AC/ CMV (Assist Control/ Continuous Mandatory Ventilation)  RR (machine): 12  TV (machine): 350  FiO2: 30  PEEP: 5  ITime: 1  MAP: 8  PIP: 28      ABG - ( 11 Apr 2021 02:25 )  pH, Arterial: 7.38  pH, Blood: x     /  pCO2: 48    /  pO2: 76    / HCO3: 28    / Base Excess: 2.4   /  SaO2: 95                  RADIOLOGY: Today I personally reviewed latest CXR and other pertinent films.

## 2021-04-12 NOTE — PROGRESS NOTE ADULT - ASSESSMENT
1. Odontoid fracture. Neurosurgery following.  2. R hip / R humerus fracture. Ortho following. Plan for AICD today. ORIF this week.  3. ESRD on HD TTS. HD tomorrow: 3 hours, opti 160 dialyzer, 3K bath, 2L UF.  4. Anemia. EPO with HD.  5. Secondary hyperparathyroidism. Hectorol with HD.

## 2021-04-12 NOTE — PROGRESS NOTE ADULT - SUBJECTIVE AND OBJECTIVE BOX
[place] NEPHROLOGY FOLLOW UP NOTE  --------------------------------------------------------------------------------  24 hour events/subjective: Patient examined. Intubated.    PAST HISTORY  --------------------------------------------------------------------------------  No significant changes to PMH, PSH, FHx, SHx, unless otherwise noted    ALLERGIES & MEDICATIONS  --------------------------------------------------------------------------------  Allergies    No Known Allergies    Standing Inpatient Medications  aMIOdarone    Tablet 200 milliGRAM(s) Oral two times a day  aspirin  chewable 81 milliGRAM(s) Oral daily  atorvastatin Oral Tab/Cap - Peds 40 milliGRAM(s) Oral daily  BACItracin   Ointment 1 Application(s) Topical two times a day  chlorhexidine 0.12% Liquid 15 milliLiter(s) Oral Mucosa every 12 hours  chlorhexidine 4% Liquid 1 Application(s) Topical <User Schedule>  dexMEDEtomidine Infusion 0.2 MICROgram(s)/kG/Hr IV Continuous <Continuous>  doxercalciferol Injectable 2 MICROGram(s) IV Push <User Schedule>  epoetin maan-epbx (RETACRIT) Injectable 51118 Unit(s) IV Push <User Schedule>  fentaNYL   Infusion. 0.5 MICROgram(s)/kG/Hr IV Continuous <Continuous>  levETIRAcetam  IVPB 375 milliGRAM(s) IV Intermittent every 12 hours  levETIRAcetam  IVPB 250 milliGRAM(s) IV Intermittent <User Schedule>  pantoprazole  Injectable 40 milliGRAM(s) IV Push daily  phenylephrine    Infusion 0.1 MICROgram(s)/kG/Min IV Continuous <Continuous>  polyethylene glycol 3350 17 Gram(s) Oral at bedtime  valproic  acid Syrup 250 milliGRAM(s) Oral two times a day    VITALS/PHYSICAL EXAM  --------------------------------------------------------------------------------  T(C): 36.5 (04-12-21 @ 12:12), Max: 38.1 (04-11-21 @ 20:14)  HR: 84 (04-12-21 @ 12:12) (84 - 110)  BP: --  RR: 12 (04-12-21 @ 12:12) (12 - 12)  SpO2: 99% (04-12-21 @ 12:12) (83% - 100%)  Wt(kg): --  Height (cm): 157.5 (04-12-21 @ 12:12)  Weight (kg): 58 (04-12-21 @ 12:12)  BMI (kg/m2): 23.4 (04-12-21 @ 12:12)  BSA (m2): 1.58 (04-12-21 @ 12:12)    04-11-21 @ 07:01  -  04-12-21 @ 07:00  --------------------------------------------------------  IN: 757.3 mL / OUT: 25 mL / NET: 732.3 mL    04-12-21 @ 07:01  -  04-12-21 @ 13:00  --------------------------------------------------------  IN: 140.5 mL / OUT: 8 mL / NET: 132.5 mL    Physical Exam:  	Gen: Intubated  	Pulm: CTA B/L  	CV: RRR, S1S2  	Abd: +BS, soft, nondistended  	: Ravinder  	LE: Warm, trace edema  	Vascular access: TDC    LABS/STUDIES  --------------------------------------------------------------------------------              9.0    8.63  >-----------<  208      [04-12-21 @ 05:08]              29.1     137  |  99  |  36  ----------------------------<  157      [04-12-21 @ 05:08]  4.3   |  24  |  3.7        Ca     8.7     [04-12-21 @ 05:08]      Mg     2.0     [04-12-21 @ 05:08]    TPro  5.5  /  Alb  2.8  /  TBili  0.3  /  DBili  x   /  AST  12  /  ALT  <5  /  AlkPhos  180  [04-12-21 @ 05:08]    PT/INR: PT 15.70, INR 1.37       [04-12-21 @ 05:08]  PTT: 29.9       [04-12-21 @ 05:08]      Creatinine Trend:  SCr 3.7 [04-12 @ 05:08]  SCr 3.1 [04-11 @ 04:30]  SCr 3.7 [04-10 @ 04:11]  SCr 3.1 [04-09 @ 04:40]  SCr 3.7 [04-08 @ 04:50]    Urinalysis - [04-02-21 @ 10:44]      Color Yellow / Appearance Turbid / SG 1.027 / pH 7.0      Gluc Negative / Ketone Negative  / Bili Negative / Urobili <2 mg/dL       Blood Moderate / Protein 100 mg/dL / Leuk Est Large / Nitrite Negative      RBC 14 / WBC >720 / Hyaline 16 / Gran  / Sq Epi  / Non Sq Epi 3 / Bacteria Moderate      Iron 55, TIBC 167, %sat 33      [05-27-20 @ 13:22]  Ferritin 636      [05-27-20 @ 13:22]  HbA1c 5.7      [11-09-19 @ 01:20]  TSH 1.71      [04-06-21 @ 04:40]

## 2021-04-12 NOTE — CONSULT NOTE ADULT - SUBJECTIVE AND OBJECTIVE BOX
Surgeon: / Flaco/ Aurea    Consult requesting by: Dr. Granda    HISTORY OF PRESENT ILLNESS:  71 yo F with PMH of CAD s/p CABG (November 2019), HFrEF (EF 20%), AS, ESRD (MWF), bladder prolapse requiring chronic Castellon, Diabetes Type 2 on insulin presents from home s/p mechanical fall. When the history was taken in the emergency department, she The denied any dizziness/ palpitations/ aura/vertigo. She reported non-radiating R shoulder pain and non-radiating R hip pain on arrival. She was found to have R humeral fracture / R hip fracture / non displaced odontoid fracture/ acute rib fractures/ ? T6 vertebral fracture.     In the emergency department, while the patient was receiving trauma work up she was found pulseless by a PCA in the room ( she was not on the monitor at that time) and had a cardiac arrest. ROSC was achieved after 2 minutes of CPR (1 epinephrine was given and 1 calcium chloride iv). Patient was intubated and sedated. EKG showed RBBB with wide complex tachycardia. Per family at the bedside, the patient had her last hemodialysis yesterday and has been relatively non compliant with her medications and medical care at home. Unable to obtain ros as patient is sedated. Pt found to have paroxysmal AF w/RVR s/p DCCV and amiodarone drip, can not rule out VT as the cause of cardiac arrest => in NSR now CHADSVASc 5. CT surgery consulted for BIV implantation.         PAST MEDICAL & SURGICAL HISTORY:  Diabetes    Congestive heart failure (CHF)    Pleural effusion due to congestive heart failure    End stage renal disease  on dialysis Mon, Wed, Fr    Uterine prolapse    Chronic indwelling Castellon catheter    History of repair of hip fracture  2019    S/P CABG (coronary artery bypass graft)  11/2019    History of thoracentesis    Chronic indwelling Castellon catheter        MEDICATIONS  (STANDING):  aMIOdarone    Tablet 200 milliGRAM(s) Oral two times a day  aspirin  chewable 81 milliGRAM(s) Oral daily  atorvastatin Oral Tab/Cap - Peds 40 milliGRAM(s) Oral daily  BACItracin   Ointment 1 Application(s) Topical two times a day  chlorhexidine 0.12% Liquid 15 milliLiter(s) Oral Mucosa every 12 hours  chlorhexidine 4% Liquid 1 Application(s) Topical <User Schedule>  dexMEDEtomidine Infusion 0.2 MICROgram(s)/kG/Hr (3.3 mL/Hr) IV Continuous <Continuous>  doxercalciferol Injectable 2 MICROGram(s) IV Push <User Schedule>  epoetin mana-epbx (RETACRIT) Injectable 78100 Unit(s) IV Push <User Schedule>  fentaNYL   Infusion. 0.5 MICROgram(s)/kG/Hr (2.9 mL/Hr) IV Continuous <Continuous>  levETIRAcetam  IVPB 375 milliGRAM(s) IV Intermittent every 12 hours  levETIRAcetam  IVPB 250 milliGRAM(s) IV Intermittent <User Schedule>  pantoprazole  Injectable 40 milliGRAM(s) IV Push daily  phenylephrine    Infusion 0.1 MICROgram(s)/kG/Min (1.09 mL/Hr) IV Continuous <Continuous>  polyethylene glycol 3350 17 Gram(s) Oral at bedtime  valproic  acid Syrup 250 milliGRAM(s) Oral two times a day    MEDICATIONS  (PRN):      Allergies    No Known Allergies    Intolerances        SOCIAL HISTORY:  Smoker: [ ] Yes  [x] No        PACK YEARS:                         WHEN QUIT?  ETOH use: [ ] Yes  [x] No              FREQUENCY / QUANTITY:  Ilicit Drug use:  [ ] Yes  [x ] No      FAMILY HISTORY:  FH: stomach cancer (Mother)    FH: myocardial infarction (Mother)        Review of Systems  CONSTITUTIONAL:  Fevers[ ] chills[ ] sweats[ ] fatigue[ ] weight loss[ ] weight gain [ ]                                     NEGATIVE [X ]   NEURO:  parathesias[ ] seizures [ ]  syncope [ ]  confusion [ ]             sedated                                                                    NEGATIVE[ ]   EYES: glasses[ ]  blurry vision[ ]  discharge[ ] pain[ ] glaucoma [ ]                                                                          NEGATIVE[X ]   ENMT:  difficulty hearing [ ]  vertigo[ ]  dysphagia[ ] epistaxis[ ] recent dental work [ ]                                    NEGATIVE[ X]   CV:  chest pain[ ] palpitations[ ] VOGEL [ ] diaphoresis [ ]                                                                                           NEGATIVE[ X]   RESPIRATORY:  wheezing[ ] SOB[ ] cough [ ] sputum[ ] hemoptysis[ ]                    intubated                                              NEGATIVE[ ]   GI:  nausea[ ]  vommiting [ ]  diarrhea[ ] constipation [ ] melena [ ]                                                                      NEGATIVE[ X]   : hematuria[ ]  dysuria[ ] urgency[ ] incontinence[ ]                                                                                            NEGATIVE[ X]   MUSKULOSKELETAL:  arthritis[ ]  joint swelling [ ] muscle weakness [ ] Hx vein stripping [ ]                             NEGATIVE[X ]   SKIN/BREAST:  rash[ ] itching [ ]  hair loss[ ] masses[ ]                                                                                              NEGATIVE[ X]   PSYCH:  dementia [ ] depresion [ ] anxiety[ ]                                                                                                               NEGATIVE[X ]   HEME/LYMPH:  bruises easily[ ] enlarged lymph nodes[ ] tender lymph nodes[ ]                                               NEGATIVE[ X]   ENDOCRINE:  cold intolerance[ ] heat intolerance[ ] polydipsia[ ]                                                                          NEGATIVE[ X]     PHYSICAL EXAM  Vital Signs Last 24 Hrs  T(C): 36.8 (12 Apr 2021 08:00), Max: 38.1 (11 Apr 2021 20:14)  T(F): 98.3 (12 Apr 2021 08:00), Max: 100.5 (11 Apr 2021 20:14)  HR: 88 (12 Apr 2021 08:00) (82 - 110)  RR: 12 (12 Apr 2021 08:00) (12 - 12)  SpO2: 100% (12 Apr 2021 08:00) (83% - 100%)    CONSTITUTIONAL:                                                                          WNL[ ]   Neuro: WNL[ ] Normal exam oriented to person/place & time with no focal motor or sensory  deficits. Other         pt is currently under sedation, no acute events            Eyes: WNL[ x]   Normal exam of conjunctiva & lids, pupils equally reactive. Other     ENT: WNL[x ]    Normal exam of nasal/oral mucosa with absence of cyanosis. Other  Neck: WNL[ x]  Normal exam of jugular veins, trachea & thyroid. Other  Chest: WNL[x ] Normal lung exam with good air movement absence of wheezes, rales, or rhonchi: Other                                                                                CV:  Auscultation: normal x[ ] S3[ ] S4[ ] Irregular [ ] Rub[ ] Clicks[ ]    Murmurs none:[x ]systolic [ ]  diastolic [ ] holosystolic [ ]  Carotids: No Bruits[x ] Other                 Abdominal Aorta: normal [ ] nonpalpable[ ]Other                                                                                      GI:           WNL[x ] Normal exam of abdomen, liver & spleen with no noted masses or tenderness. Other                                                                                                        Extremities: WNL[x ] Normal no evidence of cyanosis or deformity Edema: none[ ]trace[ ]1+[ ]2+[ ]3+[ ]4+[ ]  Lower Extremity Pulses: Right[ ] Left[ ]Varicosities[ ]  SKIN :WNL[x ] Normal exam to inspection & palation. Other:                                                          LABS:                        9.0    8.63  )-----------( 208      ( 12 Apr 2021 05:08 )             29.1     04-12    137  |  99  |  36<H>  ----------------------------<  157<H>  4.3   |  24  |  3.7<H>    Ca    8.7      12 Apr 2021 05:08  Mg     2.0     04-12    TPro  5.5<L>  /  Alb  2.8<L>  /  TBili  0.3  /  DBili  x   /  AST  12  /  ALT  <5  /  AlkPhos  180<H>  04-12    PT/INR - ( 12 Apr 2021 05:08 )   PT: 15.70 sec;   INR: 1.37 ratio         PTT - ( 12 Apr 2021 05:08 )  PTT:29.9 sec      TTE / JAMILA: < from: TTE Echo Complete w/o Contrast w/ Doppler (04.04.21 @ 10:24) >  Summary:   1. LV Ejection Fraction by Renner's Method with a biplane EF of 38 %.   2. Moderately decreased global left ventricular systolic function.   3. Entire inferior wall appears hypokinetic.   4. Mild concentric left ventricular hypertrophy.   5. Severely reduced RV systolic function.   6. Severely enlarged right ventricle.   7. Severely enlarged left atrium.   8. Peak transaortic gradient equals 48.8 mmHg, mean transaortic gradient equals 24.7 mmHg, the calculated aortic valve area equals 0.75 cm² by the continuity equation consistent with severe aortic stenosis.   9. The mitral valve leaflets are tethered due to reduced systolic function and elevated LVDP.  10. Degenerative mitral valve.  11. Moderate mitral annular calcification.  12. Severe mitral regurgitation.  13. Moderate tricuspid regurgitation.  14. Estimated pulmonary artery systolic pressure is 49.7 mmHg assuming a right atrial pressure of 15 mmHg, which is consistent with mild pulmonary hypertension.    PHYSICIAN INTERPRETATION:  Left Ventricle: The left ventricular internal cavity size is normal. Left ventricular wall thickness is mildly increased. There is mild concentric left ventricular hypertrophy. Global LV systolic function was moderately decreased. Entire inferior wall appears hypokinetic.  Right Ventricle: The right ventricular size is severely enlarged. RV systolic function is severely reduced.  Left Atrium: Severely enlarged left atrium.  Right Atrium: Moderately enlarged right atrium.  Pericardium:There is no evidence of pericardial effusion.  Mitral Valve: The mitral valve is degenerative in appearance. The mitral valve leaflets are tethered which is due to reduced systolic function and elevated LVDP. There is moderate mitral annular calcification. Severe mitral regurgitation.  Tricuspid Valve: The tricuspid valve is normal in structure. Moderate tricuspid regurgitation is visualized. Estimated pulmonary artery systolic pressure is 49.7 mmHg assuming a right atrial pressure of 15 mmHg, which is consistent with mild pulmonary hypertension.    < end of copied text >      Recommendation: (Procedures/Evaluations)  CT HEAD Nonn-Contrast:[  ]  CT Chest with /without contrast [ ]  Carotid Duplex :[  ]  CLAUDIA/PVR: [ ]  PFT : Simple PFT [ ]  Full [ ]  Renal Consult [ ]  Pulmonary Consult: [ ]   Vascular Consult [ ]    Dental Consult [ ]   Hem-Onc Consult [ ]   GI Consult [ ]   Other Consultations :      Impression:  71 yo F with PMH of CAD s/p CABG (November 2019), HFrEF (EF 20%), AS, ESRD (MWF), bladder prolapse requiring chronic Castellon, Diabetes Type 2 on insulin presents from home s/p mechanical fall. When the history was taken in the emergency department, she The denied any dizziness/ palpitations/ aura/vertigo. She reported non-radiating R shoulder pain and non-radiating R hip pain on arrival. She was found to have R humeral fracture / R hip fracture / non displaced odontoid fracture/ acute rib fractures/  T6 vertebral fracture. Pt also found to have c2 fracture with c collar in place.     In the emergency department, while the patient was receiving trauma work up she was found pulseless by a PCA in the room ( she was not on the monitor at that time) and had a cardiac arrest. ROSC was achieved after 2 minutes of CPR (1 epinephrine was given and 1 calcium chloride iv). Patient was intubated and sedated. EKG showed RBBB with wide complex tachycardia. Per family at the bedside, the patient had her last hemodialysis yesterday and has been relatively non compliant with her medications and medical care at home. Unable to obtain ros as patient is sedated. Pt found to have paroxysmal AF w/RVR s/p DCCV and amiodarone drip, can not rule out VT as the cause of cardiac arrest => in NSR now CHADSVASc 5. CT surgery consulted for BIV implantation.     Assessment/ Plan:  Keep NPO for now  pt currently sedated and intubated on pressors  Pt has C2 fracture with c collar in place- as per neurosurgery keep in place at all time unless medically necessary to remove, if removed please keep neck stable at all time.   tom discuss with Dr Sam

## 2021-04-12 NOTE — PROGRESS NOTE ADULT - SUBJECTIVE AND OBJECTIVE BOX
Specialty: Neuro Critical Care     Interval Hx: No acute events overnight. Patient remains mechanically vented, sedated on Fentanyl and Precedex. Plan for AICD placement today.     HPI:  69 yo F with PMH of CAD s/p CABG (November 2019), HFrEF (EF 20%), AS, ESRD (MWF), bladder prolapse requiring chronic Montejo, Diabetes Type 2 on insulin presents from home s/p mechanical fall. When the history was taken in the emergency department, she The denied any dizziness/ palpitations/ aura/vertigo. She reported non-radiating R shoulder pain and non-radiating R hip pain on arrival. She was found to have R humeral fracture / R hip fracture / non displaced odontoid fracture/ acute rib fractures/ ? T6 vertebral fracture.   In the emergency department, while the patient was receiving trauma work up she was found pulseless by a PCA in the room ( she was not on the monitor at that time) and had a cardiac arrest. ROSC was achieved after 2 minutes of CPR (1 epinephrine was given and 1 calcium chloride iv). Patient was intubated and sedated. EKG showed RBBB with wide complex tachycardia. Per family at the bedside, the patient had her last hemodialysis yesterday and has been relatively non compliant with her medications and medical care at home. Unable to obtain ros as patient is sedated.   T(C): 35 , HR: 60, BP: 158/67, RR: 20, SpO2: 99% vented  Labs: d-dimer ~ 4700, p-bnp 70,000, trop (0.20 <--0.17)  ABG pH, Arterial: 7.45, CO2: 36, Arterial O2: 63 , HCO3: 25 , Base Excess: 1.4 , SaO2: 94      CTH (-)   (01 Apr 2021 07:42)    Past Medical and Surgical History:  Diabetes  Congestive heart failure (CHF)  Pleural effusion due to congestive heart failure  End stage renal disease  on dialysis Mon, Wed, Fr  Uterine prolapse  Chronic indwelling Montejo catheter  History of repair of hip fracture  2019  S/P CABG (coronary artery bypass graft)  11/2019  History of thoracentesis  Chronic indwelling Montejo catheter    Allergies: No Known Allergies    ROS: Patient unable to participate in ROS due to neurologic status.    PE:  General: ill-appearing, mechanically vented, female of appropriately stated age, sedated on Fentanyl and Precedex, lying supine in bed. NAD  Mental status: Mechanically vented, sedated on Fentanyl and Precedex. Opens eyes spontaneously however does not track to voice and does not follow simple or complex commands.  Cranial Nerves: Pupils 3mm brisk bilaterally. (+) blink to threat. No gaze preference or nystagmus noted. (+) corneal reflex (+) oculocephalic reflex (+) cough reflex (+) gag reflex however diminished. Face grossly symmetrical with no loss of nasolabial folds.   Motor: Trace withdrawal to noxious stimuli B/L LE. No movement to noxious stimuli B/L UE.    Sensation: Facial grimacing noted to noxious stimuli bilaterally throughout.     Vital Signs:  ICU Vital Signs Last 24 Hrs  T(C): 36.5 (12 Apr 2021 12:12), Max: 38.1 (11 Apr 2021 20:14)  T(F): 97.7 (12 Apr 2021 12:00), Max: 100.5 (11 Apr 2021 20:14)  HR: 84 (12 Apr 2021 12:12) (84 - 110)  BP: --  BP(mean): --  ABP: 94/48 (12 Apr 2021 12:00) (86/50 - 146/84)  ABP(mean): 66 (12 Apr 2021 12:00) (64 - 108)  RR: 12 (12 Apr 2021 12:12) (12 - 12)  SpO2: 99% (12 Apr 2021 12:12) (83% - 100%)    Ventilator:  Mode: AC/ CMV (Assist Control/ Continuous Mandatory Ventilation)  RR (machine): 12  TV (machine): 350  FiO2: 30  PEEP: 5  ITime: 1  MAP: 9  PIP: 23    I&O's Detail:  11 Apr 2021 07:01  -  12 Apr 2021 07:00  --------------------------------------------------------  IN:    Dexmedetomidine: 450.5 mL    FentaNYL: 240 mL    Phenylephrine: 66.8 mL  Total IN: 757.3 mL    OUT:    Indwelling Catheter - Urethral (mL): 25 mL  Total OUT: 25 mL    Total NET: 732.3 mL    12 Apr 2021 07:01  -  12 Apr 2021 12:35  --------------------------------------------------------  IN:    Dexmedetomidine: 73 mL    FentaNYL: 59 mL    Phenylephrine: 8.5 mL  Total IN: 140.5 mL    OUT:    Indwelling Catheter - Urethral (mL): 8 mL  Total OUT: 8 mL    Total NET: 132.5 mL    Labs:  04-12    137  |  99  |  36<H>  ----------------------------<  157<H>  4.3   |  24  |  3.7<H>    Ca    8.7      12 Apr 2021 05:08  Mg     2.0     04-12    TPro  5.5<L>  /  Alb  2.8<L>  /  TBili  0.3  /  DBili  x   /  AST  12  /  ALT  <5  /  AlkPhos  180<H>  04-12                        9.0    8.63  )-----------( 208      ( 12 Apr 2021 05:08 )             29.1     PT/INR - ( 12 Apr 2021 05:08 )   PT: 15.70 sec;   INR: 1.37 ratio    PTT - ( 12 Apr 2021 05:08 )  PTT:29.9 sec  LIVER FUNCTIONS - ( 12 Apr 2021 05:08 )  Alb: 2.8 g/dL / Pro: 5.5 g/dL / ALK PHOS: 180 U/L / ALT: <5 U/L / AST: 12 U/L / GGT: x           ABG - ( 12 Apr 2021 03:01 )  pH, Arterial: 7.38  pH, Blood: x     /  pCO2: 45    /  pO2: 88    / HCO3: 27    / Base Excess: 1.2   /  SaO2: 97        Microbiology:  Culture: Blood: 4/2/2021 @1152  No growth to date    Culture: Urine: 4/2/2021 @ 1044  >=3 organisms. Probable collection contamination.    Medications Current and PRN:  MEDICATIONS  (STANDING):  aMIOdarone    Tablet 200 milliGRAM(s) Oral two times a day  aspirin  chewable 81 milliGRAM(s) Oral daily  atorvastatin Oral Tab/Cap - Peds 40 milliGRAM(s) Oral daily  BACItracin   Ointment 1 Application(s) Topical two times a day  chlorhexidine 0.12% Liquid 15 milliLiter(s) Oral Mucosa every 12 hours  chlorhexidine 4% Liquid 1 Application(s) Topical <User Schedule>  dexMEDEtomidine Infusion 0.2 MICROgram(s)/kG/Hr (3.3 mL/Hr) IV Continuous <Continuous>  doxercalciferol Injectable 2 MICROGram(s) IV Push <User Schedule>  epoetin mana-epbx (RETACRIT) Injectable 76867 Unit(s) IV Push <User Schedule>  fentaNYL   Infusion. 0.5 MICROgram(s)/kG/Hr (2.9 mL/Hr) IV Continuous <Continuous>  levETIRAcetam  IVPB 375 milliGRAM(s) IV Intermittent every 12 hours  levETIRAcetam  IVPB 250 milliGRAM(s) IV Intermittent <User Schedule>  pantoprazole  Injectable 40 milliGRAM(s) IV Push daily  phenylephrine    Infusion 0.1 MICROgram(s)/kG/Min (1.09 mL/Hr) IV Continuous <Continuous>  polyethylene glycol 3350 17 Gram(s) Oral at bedtime  valproic  acid Syrup 250 milliGRAM(s) Oral two times a day    Imaging:  VEEG in the last 24 hours: (4/5/2021):  Background----------  continues, less than optimally organized, reactive and reaching frequencies in the range of 6-7 hz  Focal and generalized slowing-------------   moderate generalized slowing; bifrontal slowing  Interictal activity---------  large amount of diffusely expressed triphasic waves that some are clealy epileptiform in nature and are stimulus dependent. This activity occasionally appears in psuedo-periodic pattern  Events---------------  none  Seizures------------  none  Impression:  1- encephalopathy  2- potential for seizure that is not lateralizing    EXAM:  CT BRAIN (4/1/2021):   FINDINGS:  The ventricles and cerebral sulci are age-appropriate.  There is no evidence of acute intracranial hemorrhage, mass effect or midline shift.  Gray-white differentiation is maintained. There are patchy periventricular and subcortical white matter hypodensities that are nonspecific but typically attributed to chronic small vessel ischemic change.  There is no fracture to the calvarium. Mastoid air cells are well aerated bilaterally. Mucosal thickening involving the left sphenoid sinus, and to a lesser extent the right sphenoid sinus. Otherwise, the visualized portions of the sinuses are unremarkable.  (There is contrast within the venous structures from the earlier CT scan of the chest and abdomen that was performed with IV contrast.)  IMPRESSION:  No CT evidence of acute intracranial pathology.    EXAM:  CT BRAIN (4/1/2021):   Findings:  The ventricles and cortical sulci are appropriate for the patient's stated age.  No intracranial hemorrhage is identified. There are no extra-axial fluid collections, space occupying processes, mass-effect, or midline shift.  There are patchy hypodensities throughout the hemispheric white matter without mass effect compatible with chronic microvascular changes.  Atherosclerotic calcifications are present at the skull base.  Gray white matter differentiation is preserved.  Evaluation of the calvarium demonstrates no bony abnormalities. The visualized paranasal sinuses and mastoids are well aerated.  IMPRESSION:  No evidence of acute intracranial pathology.    Assessment:  70-year-old female with PMHx CAD, S/P CABG (11/2019), HFrEF (EF 20%), AS, ESRD, HMD M/W/F, bladder prolapse, requiring chronic montejo, T2DM, on Insulin, BIBA 2/2 mechanical fall. Trauma workup revealed Rt humeral fracture, Rt hip fracture, non-displaced odontoid fracture, acute rib fractures, and possible T6 vertebral fracture. While in ED, s/p cardiac arrest of unknown downtime d/t unmonitored, however ROSC achieved 2 minutes after initiation of CPR. Intubated for airway protection, started on sedation and pressors. Post-cardiac arrest, noted seizure-like episode, with right gaze preference and clonic jerking of extremities while decreasing sedation for CPAP trials. Symptoms lasting approximately 5 minutes, that resolved with Versed 2mg IV. Another similar episode witnessed by family, lasting approximately one minutes, that resolved with no intervention. Initial CTH, 4/1/2021, negative for acute intracranial pathology, with subsequent imaging post seizure revealing no change from previous CTH. vEEG, 4/3/2021, consistent for metabolic process, with bifrontal sharps but no seizure activity. vEEG, 4/5/2021, revealed no seizure activity. On examination today, patient is mechanically vented, sedated on Fentanyl and Precedex, not following commands, opens eyes spontaneously however does not track to voice, with trace withdrawal B/L LE, no movement noted to noxious stimuli B/L UE, with intact brainstem reflexes, only notable decreased gag reflex. Plan for AICD placement today.     Plan:  #Neuro:  - Neuro checks q2hrs  - Seizure precautions  - Continue Keppra 375mg IV q12hrs and 250mg post-HMD  - Continue Valproic acid 250mg PO q12hrs  - Continue ASA 81mg PO q24hrs  - Continue Atorvastatin 40mg PO q24hrs  - Minimize sedation for neurological examination  - Utilize Fentanyl IVP prn for vent synchrony/agitation  - Stat CTH if any worsening or deterioration in neurological examination and call [facility]/Neurology    #CV:  - Keep normotensive  - Continue Amiodarone 200mg PO q12hrs    #Resp:  - Ventilator management as per primary team  - SAT/SBT as tolerated  - VAP protocol  - Avoid sedation; utilize Fentanyl IVP prn for vent synchrony/agitation  - Keep plateau pressure < 30 to maintain venous drainage  - Incentive spirometry 10x/hr while awake  - HOB > 35 degrees  - Aspiration precautions  - Keep SaO2 > 95%    #Renal/Fluid/Electolytes:  - Strict I/Os  - Daily weights  - Keep euvolemic  - Keep normoglycemic  - Keep normonatremic   - Keep Magnesium level > 2  - Monitor lytes, replete as needed    #GI:  - EN as per primary team  - Continue Protonix 40mg PO q24hrs  - Continue Miralax 17 grams PO q24hrs HS    #Heme/Onc:  - SCS while in bed    #ID:  - Keep normothermic; avoid fevers    Code status: Full code  Disposition: ICU      REGGIE Reyes-BC  Please feel free to contact for any questions or concerns   #[phone]

## 2021-04-12 NOTE — PROGRESS NOTE ADULT - SUBJECTIVE AND OBJECTIVE BOX
SUBJECTIVE:    Patient is a 70y old Female who presents with a chief complaint of hip fracture (08 Apr 2021 11:05)  urrently admitted to medicine with the primary diagnosis of Cardiopulmonary arrest complicated by C spine fracture and hip fracture.  s/p cath 4/9/21 no intervention     Today is hospital day 11d. This morning she remains intubated and sedated.        PAST MEDICAL & SURGICAL HISTORY  Diabetes    Congestive heart failure (CHF)    Pleural effusion due to congestive heart failure    End stage renal disease  on dialysis Mon, Wed, Fr    Uterine prolapse    Chronic indwelling Castellon catheter    History of repair of hip fracture  2019    S/P CABG (coronary artery bypass graft)  11/2019    History of thoracentesis    Chronic indwelling Castellon catheter      SOCIAL HISTORY:  Negative for smoking/alcohol/drug use.     ALLERGIES:  No Known Allergies    MEDICATIONS:  STANDING MEDICATIONS  aMIOdarone    Tablet 200 milliGRAM(s) Oral two times a day  aspirin  chewable 81 milliGRAM(s) Oral daily  atorvastatin Oral Tab/Cap - Peds 40 milliGRAM(s) Oral daily  BACItracin   Ointment 1 Application(s) Topical two times a day  chlorhexidine 0.12% Liquid 15 milliLiter(s) Oral Mucosa every 12 hours  chlorhexidine 4% Liquid 1 Application(s) Topical <User Schedule>  dexMEDEtomidine Infusion 0.2 MICROgram(s)/kG/Hr IV Continuous <Continuous>  doxercalciferol Injectable 2 MICROGram(s) IV Push <User Schedule>  epoetin mana-epbx (RETACRIT) Injectable 55819 Unit(s) IV Push <User Schedule>  fentaNYL   Infusion. 0.5 MICROgram(s)/kG/Hr IV Continuous <Continuous>  levETIRAcetam  IVPB 375 milliGRAM(s) IV Intermittent every 12 hours  levETIRAcetam  IVPB 250 milliGRAM(s) IV Intermittent <User Schedule>  pantoprazole  Injectable 40 milliGRAM(s) IV Push daily  phenylephrine    Infusion 0.1 MICROgram(s)/kG/Min IV Continuous <Continuous>  polyethylene glycol 3350 17 Gram(s) Oral at bedtime  valproic  acid Syrup 250 milliGRAM(s) Oral two times a day    PRN MEDICATIONS    VITALS:   T(F): 97.7  HR: 84  BP: --  RR: 12  SpO2: 99%    LABS:                        9.0    8.63  )-----------( 208      ( 12 Apr 2021 05:08 )             29.1     04-12    137  |  99  |  36<H>  ----------------------------<  157<H>  4.3   |  24  |  3.7<H>    Ca    8.7      12 Apr 2021 05:08  Mg     2.0     04-12    TPro  5.5<L>  /  Alb  2.8<L>  /  TBili  0.3  /  DBili  x   /  AST  12  /  ALT  <5  /  AlkPhos  180<H>  04-12    PT/INR - ( 12 Apr 2021 05:08 )   PT: 15.70 sec;   INR: 1.37 ratio         PTT - ( 12 Apr 2021 05:08 )  PTT:29.9 sec    ABG - ( 12 Apr 2021 03:01 )  pH, Arterial: 7.38  pH, Blood: x     /  pCO2: 45    /  pO2: 88    / HCO3: 27    / Base Excess: 1.2   /  SaO2: 97                        RADIOLOGY:    PHYSICAL EXAM:  GEN: No acute distress  LUNGS: Clear to auscultation bilaterally   HEART: S1/S2 present. RRR.   ABD: Soft, non-tender, non-distended. Bowel sounds present  EXT: NC/NC/NE/2+PP/GEORGE  NEURO: AAOX3

## 2021-04-12 NOTE — PROGRESS NOTE ADULT - ATTENDING COMMENTS
Patient remains intubated, sedated, on pressor support.     Plan for ICD placement today    Ortho follow up for Hip surgery after ICD placed   Daily sedation holiday   Wean pressors as tolerated for low BP   HD per schedule for volume management   Vent management/sedation per PCCM

## 2021-04-12 NOTE — PROGRESS NOTE ADULT - ATTENDING COMMENTS
Patient seen today with neurocritical care ACP team NP Sophie.      I was physically present for the key portions of the evaluation and management (E/M) service provided.  I agree with the above history, physical, and plan with the following additions/modifications     Patient s/p cardiac arrest was consistently following commands over the last few days, not doing so today, please perform daily sedation holidays to confirm intact neuro status.  will follow, though overall appears to have good neurologic prognosis if she can survive ongoing medical illness.    Sergio Bolanos MD  Attending Neurointensivist

## 2021-04-12 NOTE — PROGRESS NOTE ADULT - SUBJECTIVE AND OBJECTIVE BOX
Surgeon: / Flaco/ Aurea    Consult requesting by: Dr. Granda    HISTORY OF PRESENT ILLNESS:  71 yo F with PMH of CAD s/p CABG (November 2019), HFrEF (EF 20%), AS, ESRD (MWF), bladder prolapse requiring chronic Castellon, Diabetes Type 2 on insulin presents from home s/p mechanical fall. When the history was taken in the emergency department, she The denied any dizziness/ palpitations/ aura/vertigo. She reported non-radiating R shoulder pain and non-radiating R hip pain on arrival. She was found to have R humeral fracture / R hip fracture / non displaced odontoid fracture/ acute rib fractures/ ? T6 vertebral fracture.     In the emergency department, while the patient was receiving trauma work up she was found pulseless by a PCA in the room ( she was not on the monitor at that time) and had a cardiac arrest. ROSC was achieved after 2 minutes of CPR (1 epinephrine was given and 1 calcium chloride iv). Patient was intubated and sedated. EKG showed RBBB with wide complex tachycardia. Per family at the bedside, the patient had her last hemodialysis yesterday and has been relatively non compliant with her medications and medical care at home. Unable to obtain ros as patient is sedated. Pt found to have paroxysmal AF w/RVR s/p DCCV and amiodarone drip, can not rule out VT as the cause of cardiac arrest => in NSR now CHADSVASc 5. CT surgery consulted for BIV implantation.         PAST MEDICAL & SURGICAL HISTORY:  Diabetes    Congestive heart failure (CHF)    Pleural effusion due to congestive heart failure    End stage renal disease  on dialysis Mon, Wed, Fr    Uterine prolapse    Chronic indwelling Castellon catheter    History of repair of hip fracture  2019    S/P CABG (coronary artery bypass graft)  11/2019    History of thoracentesis    Chronic indwelling Castellon catheter        MEDICATIONS  (STANDING):  aMIOdarone    Tablet 200 milliGRAM(s) Oral two times a day  aspirin  chewable 81 milliGRAM(s) Oral daily  atorvastatin Oral Tab/Cap - Peds 40 milliGRAM(s) Oral daily  BACItracin   Ointment 1 Application(s) Topical two times a day  chlorhexidine 0.12% Liquid 15 milliLiter(s) Oral Mucosa every 12 hours  chlorhexidine 4% Liquid 1 Application(s) Topical <User Schedule>  dexMEDEtomidine Infusion 0.2 MICROgram(s)/kG/Hr (3.3 mL/Hr) IV Continuous <Continuous>  doxercalciferol Injectable 2 MICROGram(s) IV Push <User Schedule>  epoetin mana-epbx (RETACRIT) Injectable 46854 Unit(s) IV Push <User Schedule>  fentaNYL   Infusion. 0.5 MICROgram(s)/kG/Hr (2.9 mL/Hr) IV Continuous <Continuous>  levETIRAcetam  IVPB 375 milliGRAM(s) IV Intermittent every 12 hours  levETIRAcetam  IVPB 250 milliGRAM(s) IV Intermittent <User Schedule>  pantoprazole  Injectable 40 milliGRAM(s) IV Push daily  phenylephrine    Infusion 0.1 MICROgram(s)/kG/Min (1.09 mL/Hr) IV Continuous <Continuous>  polyethylene glycol 3350 17 Gram(s) Oral at bedtime  valproic  acid Syrup 250 milliGRAM(s) Oral two times a day    MEDICATIONS  (PRN):      Allergies    No Known Allergies    Intolerances        SOCIAL HISTORY:  Smoker: [ ] Yes  [ ] No        PACK YEARS:                         WHEN QUIT?  ETOH use: [ ] Yes  [ ] No              FREQUENCY / QUANTITY:  Ilicit Drug use:  [ ] Yes  [ ] No  Occupation:  Lives with:  Assisted device use:  5 meter walk test: 1____sec, 2____sec, 3___sec  FAMILY HISTORY:  FH: stomach cancer (Mother)    FH: myocardial infarction (Mother)        Review of Systems  CONSTITUTIONAL:  Fevers[ ] chills[ ] sweats[ ] fatigue[ ] weight loss[ ] weight gain [ ]                                     NEGATIVE [X ]   NEURO:  parathesias[ ] seizures [ ]  syncope [ ]  confusion [ ]                                                                                NEGATIVE[ X]   EYES: glasses[ ]  blurry vision[ ]  discharge[ ] pain[ ] glaucoma [ ]                                                                          NEGATIVE[X ]   ENMT:  difficulty hearing [ ]  vertigo[ ]  dysphagia[ ] epistaxis[ ] recent dental work [ ]                                    NEGATIVE[ X]   CV:  chest pain[ ] palpitations[ ] VOGEL [ ] diaphoresis [ ]                                                                                           NEGATIVE[ X]   RESPIRATORY:  wheezing[ ] SOB[ ] cough [ ] sputum[ ] hemoptysis[ ]                                                                  NEGATIVE[ ]   GI:  nausea[ ]  vommiting [ ]  diarrhea[ ] constipation [ ] melena [ ]                                                                      NEGATIVE[ X]   : hematuria[ ]  dysuria[ ] urgency[ ] incontinence[ ]                                                                                            NEGATIVE[ X]   MUSKULOSKELETAL:  arthritis[ ]  joint swelling [ ] muscle weakness [ ] Hx vein stripping [ ]                             NEGATIVE[X ]   SKIN/BREAST:  rash[ ] itching [ ]  hair loss[ ] masses[ ]                                                                                              NEGATIVE[ X]   PSYCH:  dementia [ ] depresion [ ] anxiety[ ]                                                                                                               NEGATIVE[X ]   HEME/LYMPH:  bruises easily[ ] enlarged lymph nodes[ ] tender lymph nodes[ ]                                               NEGATIVE[ X]   ENDOCRINE:  cold intolerance[ ] heat intolerance[ ] polydipsia[ ]                                                                          NEGATIVE[ X]     PHYSICAL EXAM  Vital Signs Last 24 Hrs  T(C): 36.8 (12 Apr 2021 08:00), Max: 38.1 (11 Apr 2021 20:14)  T(F): 98.3 (12 Apr 2021 08:00), Max: 100.5 (11 Apr 2021 20:14)  HR: 88 (12 Apr 2021 08:00) (82 - 110)  RR: 12 (12 Apr 2021 08:00) (12 - 12)  SpO2: 100% (12 Apr 2021 08:00) (83% - 100%)    CONSTITUTIONAL:                                                                          WNL[ ]   Neuro: WNL[ ] Normal exam oriented to person/place & time with no focal motor or sensory  deficits. Other                     Eyes: WNL[ ]   Normal exam of conjunctiva & lids, pupils equally reactive. Other     ENT: WNL[ ]    Normal exam of nasal/oral mucosa with absence of cyanosis. Other  Neck: WNL[ ]  Normal exam of jugular veins, trachea & thyroid. Other  Chest: WNL[ ] Normal lung exam with good air movement absence of wheezes, rales, or rhonchi: Other                                                                                CV:  Auscultation: normal [ ] S3[ ] S4[ ] Irregular [ ] Rub[ ] Clicks[ ]    Murmurs none:[ ]systolic [ ]  diastolic [ ] holosystolic [ ]  Carotids: No Bruits[ ] Other                 Abdominal Aorta: normal [ ] nonpalpable[ ]Other                                                                                      GI:           WNL[ ] Normal exam of abdomen, liver & spleen with no noted masses or tenderness. Other                                                                                                        Extremities: WNL[ ] Normal no evidence of cyanosis or deformity Edema: none[ ]trace[ ]1+[ ]2+[ ]3+[ ]4+[ ]  Lower Extremity Pulses: Right[ ] Left[ ]Varicosities[ ]  SKIN :WNL[ ] Normal exam to inspection & palation. Other:                                                          LABS:                        9.0    8.63  )-----------( 208      ( 12 Apr 2021 05:08 )             29.1     04-12    137  |  99  |  36<H>  ----------------------------<  157<H>  4.3   |  24  |  3.7<H>    Ca    8.7      12 Apr 2021 05:08  Mg     2.0     04-12    TPro  5.5<L>  /  Alb  2.8<L>  /  TBili  0.3  /  DBili  x   /  AST  12  /  ALT  <5  /  AlkPhos  180<H>  04-12    PT/INR - ( 12 Apr 2021 05:08 )   PT: 15.70 sec;   INR: 1.37 ratio         PTT - ( 12 Apr 2021 05:08 )  PTT:29.9 sec      TTE / JAMILA: < from: TTE Echo Complete w/o Contrast w/ Doppler (04.04.21 @ 10:24) >  Summary:   1. LV Ejection Fraction by Renner's Method with a biplane EF of 38 %.   2. Moderately decreased global left ventricular systolic function.   3. Entire inferior wall appears hypokinetic.   4. Mild concentric left ventricular hypertrophy.   5. Severely reduced RV systolic function.   6. Severely enlarged right ventricle.   7. Severely enlarged left atrium.   8. Peak transaortic gradient equals 48.8 mmHg, mean transaortic gradient equals 24.7 mmHg, the calculated aortic valve area equals 0.75 cm² by the continuity equation consistent with severe aortic stenosis.   9. The mitral valve leaflets are tethered due to reduced systolic function and elevated LVDP.  10. Degenerative mitral valve.  11. Moderate mitral annular calcification.  12. Severe mitral regurgitation.  13. Moderate tricuspid regurgitation.  14. Estimated pulmonary artery systolic pressure is 49.7 mmHg assuming a right atrial pressure of 15 mmHg, which is consistent with mild pulmonary hypertension.    PHYSICIAN INTERPRETATION:  Left Ventricle: The left ventricular internal cavity size is normal. Left ventricular wall thickness is mildly increased. There is mild concentric left ventricular hypertrophy. Global LV systolic function was moderately decreased. Entire inferior wall appears hypokinetic.  Right Ventricle: The right ventricular size is severely enlarged. RV systolic function is severely reduced.  Left Atrium: Severely enlarged left atrium.  Right Atrium: Moderately enlarged right atrium.  Pericardium:There is no evidence of pericardial effusion.  Mitral Valve: The mitral valve is degenerative in appearance. The mitral valve leaflets are tethered which is due to reduced systolic function and elevated LVDP. There is moderate mitral annular calcification. Severe mitral regurgitation.  Tricuspid Valve: The tricuspid valve is normal in structure. Moderate tricuspid regurgitation is visualized. Estimated pulmonary artery systolic pressure is 49.7 mmHg assuming a right atrial pressure of 15 mmHg, which is consistent with mild pulmonary hypertension.    < end of copied text >      Recommendation: (Procedures/Evaluations)  CT HEAD Nonn-Contrast:[  ]  CT Chest with /without contrast [ ]  Carotid Duplex :[  ]  CLAUDIA/PVR: [ ]  PFT : Simple PFT [ ]  Full [ ]  Renal Consult [ ]  Pulmonary Consult: [ ]   Vascular Consult [ ]    Dental Consult [ ]   Hem-Onc Consult [ ]   GI Consult [ ]   Other Consultations :      Impression:    CAD [ ]  Valvular  disease [ ]   Aortic Disease [ ]   RIC: Yes[ ] No [ ]   CKD Stage I [ ] , Stage II [ ] , Stage III [ ], Stage IV [ ]   Anemia: Yes [ ], No [ ]  Diabetes :Yes [ ], No [ ]  Acute MI: Yes [ ], No [ ]   Heart Failure: Yes [ ] , No [ ] HFpEF [ ], HFrEF [ ]        Assessment/ Plan:

## 2021-04-12 NOTE — PROGRESS NOTE ADULT - ASSESSMENT
CTS ATTENDING    patient examined in [facility]cu  case discussed with Dr Granda  daughter interviewed by telephone  pt referred for CRT-ICD implant, possible left thoracotomy  procedure, risks, benefits and alternatives explained and patient's daughter agreed and gave consent to proceed with surgery    -FMR

## 2021-04-12 NOTE — PRE-ANESTHESIA EVALUATION ADULT - NSANTHPMHFT_GEN_ALL_CORE
other active issues  +odontoid fracture   +LUE DVT (left brachial vein)   +AFib   +RBBB  +severe aortic stenosis  +seizure  Echo 4/1/21  1. LV Ejection Fraction by Renner's Method with a biplane EF of 38 %.   2. Moderately decreased global left ventricular systolic function.   3. Entire inferior wall appears hypokinetic.   4. Mild concentric left ventricular hypertrophy.   5. Severely reduced RV systolic function.   6. Severely enlarged right ventricle.   7. Severely enlarged left atrium.   8. Peak transaortic gradient equals 48.8 mmHg, mean transaortic gradient equals 24.7 mmHg, the calculated aortic valve area equals 0.75 cmï¿½ by the continuity equation consistent with severe aortic stenosis.   9. The mitral valve leaflets are tethered due to reduced systolic function and elevated LVDP.  10. Degenerative mitral valve.  11. Moderate mitral annular calcification.  12. Severe mitral regurgitation.  13. Moderate tricuspid regurgitation.  14. Estimated pulmonary artery systolic pressure is 49.7 mmHg assuming a right atrial pressure of 15 mmHg, which is consistent with mild pulmonary hypertension.

## 2021-04-12 NOTE — PROGRESS NOTE ADULT - SUBJECTIVE AND OBJECTIVE BOX
HPI  Patient is a 70y old Female who presents with a chief complaint of hip fracture (08 Apr 2021 11:05)    Currently admitted to medicine with the primary diagnosis of Cardiopulmonary arrest       Today is hospital day 11d.     INTERVAL HPI / OVERNIGHT EVENTS:  Patient was examined and seen at bedside. This morning she is resting comfortably in bed and reports no new issues or overnight events.     ROS: Otherwise unremarkable     PAST MEDICAL & SURGICAL HISTORY  Diabetes    Congestive heart failure (CHF)    Pleural effusion due to congestive heart failure    End stage renal disease  on dialysis Mon, Wed, Fr    Uterine prolapse    Chronic indwelling Castellon catheter    History of repair of hip fracture  2019    S/P CABG (coronary artery bypass graft)  11/2019    History of thoracentesis    Chronic indwelling Castellon catheter      ALLERGIES  No Known Allergies    MEDICATIONS  STANDING MEDICATIONS  aMIOdarone    Tablet 200 milliGRAM(s) Oral two times a day  aspirin  chewable 81 milliGRAM(s) Oral daily  atorvastatin Oral Tab/Cap - Peds 40 milliGRAM(s) Oral daily  BACItracin   Ointment 1 Application(s) Topical two times a day  chlorhexidine 0.12% Liquid 15 milliLiter(s) Oral Mucosa every 12 hours  chlorhexidine 4% Liquid 1 Application(s) Topical <User Schedule>  dexMEDEtomidine Infusion 0.2 MICROgram(s)/kG/Hr IV Continuous <Continuous>  doxercalciferol Injectable 2 MICROGram(s) IV Push <User Schedule>  epoetin mana-epbx (RETACRIT) Injectable 20444 Unit(s) IV Push <User Schedule>  fentaNYL   Infusion. 0.5 MICROgram(s)/kG/Hr IV Continuous <Continuous>  levETIRAcetam  IVPB 375 milliGRAM(s) IV Intermittent every 12 hours  levETIRAcetam  IVPB 250 milliGRAM(s) IV Intermittent <User Schedule>  pantoprazole  Injectable 40 milliGRAM(s) IV Push daily  phenylephrine    Infusion 0.1 MICROgram(s)/kG/Min IV Continuous <Continuous>  polyethylene glycol 3350 17 Gram(s) Oral at bedtime  valproic  acid Syrup 250 milliGRAM(s) Oral two times a day    PRN MEDICATIONS    VITALS:  T(F): 99.8  HR: 88  BP: --  RR: 12  SpO2: 100%    PHYSICAL EXAM  GEN: NAD, Resting comfortably in bed  PULM: Clear to auscultation bilaterally, No wheezes  CVS: Regular rate and rhythm, S1-S2, no murmurs  ABD: Soft, non-tender, non-distended, no guarding  EXT: No edema  NEURO: A&Ox3, no focal deficits    LABS                        9.0    8.63  )-----------( 208      ( 12 Apr 2021 05:08 )             29.1     04-12    137  |  99  |  36<H>  ----------------------------<  157<H>  4.3   |  24  |  3.7<H>    Ca    8.7      12 Apr 2021 05:08  Mg     2.0     04-12    TPro  5.5<L>  /  Alb  2.8<L>  /  TBili  0.3  /  DBili  x   /  AST  12  /  ALT  <5  /  AlkPhos  180<H>  04-12    PT/INR - ( 12 Apr 2021 05:08 )   PT: 15.70 sec;   INR: 1.37 ratio         PTT - ( 12 Apr 2021 05:08 )  PTT:29.9 sec    ABG - ( 12 Apr 2021 03:01 )  pH, Arterial: 7.38  pH, Blood: x     /  pCO2: 45    /  pO2: 88    / HCO3: 27    / Base Excess: 1.2   /  SaO2: 97            RADIOLOGY  c< from: Xray Chest 1 View- PORTABLE-Routine (Xray Chest 1 View- PORTABLE-Routine in AM.) (04.11.21 @ 06:22) >  EXAM:  XR CHEST PORTABLE ROUTINE 1V            PROCEDURE DATE:  04/11/2021            INTERPRETATION:  Clinical History / Reason for exam: Cardiac arrest.    Comparison : Chest radiograph April 10, 2021.    Technique/Positioning: Adequate.    Findings:    Support devices: ETT with its tip above the olya, NGT with its tip below the diaphragm, left IJ line with its tip overlying the SVC and right IJ double-lumen catheter with both tips overlying the SVC.    Cardiac/mediastinum/hilum: Stable. Status post a median sternotomy.    Lung parenchyma/Pleura: Bilateral opacities, unchanged. No pneumothorax seen.    Skeleton/soft tissues: Stable    Impression:    Status post a median sternotomy.    Bilateral opacities, unchanged.    Support tubes andlines as above.        EDIL CORNELL MD; Attending Radiologist  This document has been electronically signed. Apr 11 2021  7:26AM    < end of copied text >        Assessment and Plan:   · Assessment	  71 y/o F with a pmhx of CAD s/p CABG (Nov 2019), HFrEF (EF 20%), AS, ESRD (MWF), bladder prolapse requiring chronic Castellon, Diabetes Type 2 on insulin presents from home s/p mechanical fall. Trauma workup in ED significant for R humeral fracture / R hip fracture / non displaced odontoid fracture/ acute rib fractures/ ? T6 vertebral fracture. In the ED patient arrested, ROSC achieved after 2 minutes of CPR. EKG at the time showed RBBB with wide complex tachy. Pt subsequently intubated and sedated, started on pressors. Pt Experienced seizure-like episode during weaning trial s/p 2mg Versed.  (4/2/21) Weaning trial passed. Patient was Extubated . 02 sat began to decrease to low 80s, tachypenic and cyanotic started on bipap with no improvement. Patient again became hypotensive and tachy. Went into Afib. s/p cardioversion x3 and started on amio     # RT Humerus/Rt femur fracture/Rt Hip fracture   - Ortho following  - Pain control. Currently on Fentanyl drip   - Patient is scheduled for OR  TODAY (4/8) for hip stabilization with ortho.   - 1u of pRBCs for procedure   - Patient is NPO for procedure      #Cardiac arrest in the setting of arythmia and CHF exacerbation   - she will be taken for heart catherization today   - Trops .22-->.24 -->.21-->.34 (4/4/21)  - CTA negative for PE   - Lower extremity duplex negative for DVT( 4/6/21)  - EP recs : Plan for CRT-D placement FRIDAY before extubation due to C2 fracture. Will be scheduled for end of day due to Adenovirus infection.   - Patient is NPO for procedure   - On heparin subq   - F/u TSH results   - weaning Norepinephrine     # Seizure unknown etiology  - CT Head (4/1) negative   - Keppra 500mg adminstered. followed by 375mg BID with additional 250mg dose after dialysis  - Started on Depakote 250mg PO BID   - VEEG - bifrontal sharps but no seizure activity. Consistent with metabolic process.     # Aspiration Pneumonia vs Adenovirus Pneumonia   - CXR (4/5) Bilateral opacities, decreased. Stable cardiomegaly/left pleural effusion..  - ID Recs : Unasyn 3 gm iv q24h, D/c rocephin, Deep ET cultures  - s/p Completion of ceftriaxone     # Perirectal abscess  - surgery is following patient. Dr Mota   - s/p debridement over a month. No need for packing as per surgery     #Odontoid fracture   - Neurosurgery following patient   - Want to c/w collar      #ESRD  - Nephro following patient  - Patient s/p HD today 4/8    # h/o DM   - fingersticks tid  - hold home insulin  - insulin protocol if > 180    # DVT PPX: heparin subq  # GI PPX: PPI  # Fluids : D5 LR @ 50  # Diet: NPO   # Activity: bedrest  # Bowel regimen       HPI  Patient is a 70y old Female who presents with a chief complaint of hip fracture (08 Apr 2021 11:05)    Currently admitted to medicine with the primary diagnosis of Cardiopulmonary arrest       Today is hospital day 11d.     INTERVAL HPI / OVERNIGHT EVENTS:  Patient was examined and seen at bedside. Patient is intubated and sedated. Last night patient spiked a fever of 100.5.    ROS: Otherwise unremarkable     PAST MEDICAL & SURGICAL HISTORY  Diabetes    Congestive heart failure (CHF)    Pleural effusion due to congestive heart failure    End stage renal disease  on dialysis Mon, Wed, Fr    Uterine prolapse    Chronic indwelling Castellon catheter    History of repair of hip fracture  2019    S/P CABG (coronary artery bypass graft)  11/2019    History of thoracentesis    Chronic indwelling Castellon catheter      ALLERGIES  No Known Allergies    MEDICATIONS  STANDING MEDICATIONS  aMIOdarone    Tablet 200 milliGRAM(s) Oral two times a day  aspirin  chewable 81 milliGRAM(s) Oral daily  atorvastatin Oral Tab/Cap - Peds 40 milliGRAM(s) Oral daily  BACItracin   Ointment 1 Application(s) Topical two times a day  chlorhexidine 0.12% Liquid 15 milliLiter(s) Oral Mucosa every 12 hours  chlorhexidine 4% Liquid 1 Application(s) Topical <User Schedule>  dexMEDEtomidine Infusion 0.2 MICROgram(s)/kG/Hr IV Continuous <Continuous>  doxercalciferol Injectable 2 MICROGram(s) IV Push <User Schedule>  epoetin mana-epbx (RETACRIT) Injectable 77196 Unit(s) IV Push <User Schedule>  fentaNYL   Infusion. 0.5 MICROgram(s)/kG/Hr IV Continuous <Continuous>  levETIRAcetam  IVPB 375 milliGRAM(s) IV Intermittent every 12 hours  levETIRAcetam  IVPB 250 milliGRAM(s) IV Intermittent <User Schedule>  pantoprazole  Injectable 40 milliGRAM(s) IV Push daily  phenylephrine    Infusion 0.1 MICROgram(s)/kG/Min IV Continuous <Continuous>  polyethylene glycol 3350 17 Gram(s) Oral at bedtime  valproic  acid Syrup 250 milliGRAM(s) Oral two times a day    PRN MEDICATIONS    VITALS:  T(F): 99.8  HR: 88  BP: --  RR: 12  SpO2: 100%    PHYSICAL EXAM  GEN: NAD, Resting comfortably in bed  PULM: Clear to auscultation bilaterally, No wheezes  CVS: Regular rate and rhythm, S1-S2, no murmurs  ABD: Soft, non-tender, non-distended, no guarding  EXT: No edema  NEURO: A&Ox3, no focal deficits    LABS                        9.0    8.63  )-----------( 208      ( 12 Apr 2021 05:08 )             29.1     04-12    137  |  99  |  36<H>  ----------------------------<  157<H>  4.3   |  24  |  3.7<H>    Ca    8.7      12 Apr 2021 05:08  Mg     2.0     04-12    TPro  5.5<L>  /  Alb  2.8<L>  /  TBili  0.3  /  DBili  x   /  AST  12  /  ALT  <5  /  AlkPhos  180<H>  04-12    PT/INR - ( 12 Apr 2021 05:08 )   PT: 15.70 sec;   INR: 1.37 ratio         PTT - ( 12 Apr 2021 05:08 )  PTT:29.9 sec    ABG - ( 12 Apr 2021 03:01 )  pH, Arterial: 7.38  pH, Blood: x     /  pCO2: 45    /  pO2: 88    / HCO3: 27    / Base Excess: 1.2   /  SaO2: 97            RADIOLOGY  c< from: Xray Chest 1 View- PORTABLE-Routine (Xray Chest 1 View- PORTABLE-Routine in AM.) (04.11.21 @ 06:22) >  EXAM:  XR CHEST PORTABLE ROUTINE 1V            PROCEDURE DATE:  04/11/2021            INTERPRETATION:  Clinical History / Reason for exam: Cardiac arrest.    Comparison : Chest radiograph April 10, 2021.    Technique/Positioning: Adequate.    Findings:    Support devices: ETT with its tip above the olya, NGT with its tip below the diaphragm, left IJ line with its tip overlying the SVC and right IJ double-lumen catheter with both tips overlying the SVC.    Cardiac/mediastinum/hilum: Stable. Status post a median sternotomy.    Lung parenchyma/Pleura: Bilateral opacities, unchanged. No pneumothorax seen.    Skeleton/soft tissues: Stable    Impression:    Status post a median sternotomy.    Bilateral opacities, unchanged.    Support tubes andlines as above.        EDIL CORNELL MD; Attending Radiologist  This document has been electronically signed. Apr 11 2021  7:26AM    < end of copied text >        Assessment and Plan:   · Assessment	  71 y/o F with a pmhx of CAD s/p CABG (Nov 2019), HFrEF (EF 20%), AS, ESRD (MWF), bladder prolapse requiring chronic Castellon, Diabetes Type 2 on insulin presents from home s/p mechanical fall. Trauma workup in ED significant for R humeral fracture / R hip fracture / non displaced odontoid fracture/ acute rib fractures/ ? T6 vertebral fracture. In the ED patient arrested, ROSC achieved after 2 minutes of CPR. EKG at the time showed RBBB with wide complex tachy. Pt subsequently intubated and sedated, started on pressors. Pt Experienced seizure-like episode during weaning trial s/p 2mg Versed.  (4/2/21) Weaning trial passed. Patient was Extubated . 02 sat began to decrease to low 80s, tachypenic and cyanotic started on bipap with no improvement. Patient again became hypotensive and tachy. Went into Afib. s/p cardioversion x3 and started on amio     # RT Humerus/Rt femur fracture/Rt Hip fracture   - Ortho following  - Pain control. Currently on Fentanyl drip   - Patient taken to the OR 4/8. Procedure cancelled because patient went into SVT, s/p adenosine. Started on amio at the time   - Pending AICD placement prior to hip stabilization       #Cardiac arrest in the setting of arythmia and CHF exacerbation   - s/p LHC (4/9) - Patent LIMA and SVG grafts, Transaortic gradient ~ 10-15mmHg  - Trops .22-->.24 -->.21-->.34 (4/4/21)  - CTA negative for PE   - Lower extremity duplex negative for DVT( 4/6/21)  - Plan for AICD placement today with CT surgery   - Patient is NPO for procedure   - Holding heparin     #LUE DVT  - UE duplex : Thrombus noted in the left brachial vein.  - Started on Heparin   - Holding heparin today for AICD placement      # Seizure unknown etiology  - CT Head (4/1) negative   - Keppra 500mg adminstered. followed by 375mg BID with additional 250mg dose after dialysis  - Started on Depakote 250mg PO BID   - VEEG - bifrontal sharps but no seizure activity. Consistent with metabolic process.     # Aspiration Pneumonia vs Adenovirus Pneumonia   - CXR : Stable bilateral opacities. No pneumothorax. Support devices, in satisfactory position.  - s/p Completion of ceftriaxone   - Repeat blood cultures today     # Perirectal abscess  - surgery is following patient. Dr Mota   - s/p debridement over a month. No need for packing as per surgery     #Odontoid fracture   - Neurosurgery following patient   - Want to c/w collar      #ESRD  - Nephro following patient  - HD for tuesday 4/13    # h/o DM   - fingersticks tid  - hold home insulin  - insulin protocol if > 180    # DVT PPX: heparin subq  # GI PPX: PPI  # Diet: NPO   # Activity: bedrest  # Bowel regimen

## 2021-04-12 NOTE — PROGRESS NOTE ADULT - ASSESSMENT
IMPRESSION:  - Cardiac arrest in the setting of arrythmia? s/p cath 4/9/21 triple vessel disease with open grafts  - RT Humerus/Rt femur fracture/Rt Hip fracture - pending hip surgery  - Seizure unknown etiology  - Aspiration Pneumonia vs Adenovirus Pneumonia  - ESRD  - Odontoid fracture  - h/o DM     PLAN:    CNS:   - Spontaneous awakening trial    HEENT:  - Oral care    PULMONARY:   - HOB @ 45 degrees.  Vent management per pulm/crit    CARDIOVASCULAR:  - s/p cath: patent grafts - no significant aortic valve gradient  - TTE noted: EF 38% - severe AS based on echo (but no gradient in cath)  - pAF on amiodarone - continue amiodarone  - Will need betablockers and ACE/ARB once off pressors  - Plan for AICD for secondary prevention (tentatively on Tuesday)  - F/U EP    GI:   - GI prophylaxis.  Feeding     RENAL:    - Follow up lytes.  Correct as needed  - HD as per renal    INFECTIOUS DISEASE:   - Follow up cultures  - ABx per critical care team    HEMATOLOGICAL:    - DVT prophylaxis for now  - will need full anticoagulation for AF once cleared    ENDOCRINE:    - Follow up FS.  Insulin protocol if needed.    MUSCULOSKELETAL:  - increase as tolerated    DISPO: ICU

## 2021-04-12 NOTE — PROGRESS NOTE ADULT - SUBJECTIVE AND OBJECTIVE BOX
Patient is a 70y old  Female who presents with a chief complaint of hip fracture (08 Apr 2021 11:05)      Over Night Events:  Patient seen and examined.   still vented pending AICD     ROS:  See HPI    PHYSICAL EXAM    ICU Vital Signs Last 24 Hrs  T(C): 37.7 (12 Apr 2021 04:00), Max: 38.1 (11 Apr 2021 20:14)  T(F): 99.8 (12 Apr 2021 04:00), Max: 100.5 (11 Apr 2021 20:14)  HR: 98 (12 Apr 2021 07:00) (82 - 110)  BP: --  BP(mean): --  ABP: 116/60 (12 Apr 2021 07:00) (82/48 - 146/84)  ABP(mean): 80 (12 Apr 2021 07:00) (60 - 108)  RR: 12 (11 Apr 2021 19:00) (12 - 12)  SpO2: 100% (12 Apr 2021 07:00) (83% - 100%)      General: awake   HEENT: mone               Lymph Nodes: NO cervical LN   Lungs: Bilateral BS  Cardiovascular: Regular   Abdomen: Soft, Positive BS  Extremities: No clubbing   Skin: warm   Neurological: no focal   Musculoskeletal: move all ext     I&O's Detail    11 Apr 2021 07:01  -  12 Apr 2021 07:00  --------------------------------------------------------  IN:    Dexmedetomidine: 450.5 mL    FentaNYL: 240 mL    Phenylephrine: 66.8 mL  Total IN: 757.3 mL    OUT:    Indwelling Catheter - Urethral (mL): 25 mL  Total OUT: 25 mL    Total NET: 732.3 mL          LABS:                          9.0    8.63  )-----------( 208      ( 12 Apr 2021 05:08 )             29.1         12 Apr 2021 05:08    137    |  99     |  36     ----------------------------<  157    4.3     |  24     |  3.7      Ca    8.7        12 Apr 2021 05:08  Mg     2.0       12 Apr 2021 05:08    TPro  5.5    /  Alb  2.8    /  TBili  0.3    /  DBili  x      /  AST  12     /  ALT  <5     /  AlkPhos  180    12 Apr 2021 05:08  Amylase x     lipase x                                                 PT/INR - ( 12 Apr 2021 05:08 )   PT: 15.70 sec;   INR: 1.37 ratio         PTT - ( 12 Apr 2021 05:08 )  PTT:29.9 sec                                                                                                                                                Mode: AC/ CMV (Assist Control/ Continuous Mandatory Ventilation)  RR (machine): 12  TV (machine): 350  FiO2: 30  PEEP: 5  ITime: 1  MAP: 8  PIP: 24                                      ABG - ( 12 Apr 2021 03:01 )  pH, Arterial: 7.38  pH, Blood: x     /  pCO2: 45    /  pO2: 88    / HCO3: 27    / Base Excess: 1.2   /  SaO2: 97                  MEDICATIONS  (STANDING):  aMIOdarone    Tablet 200 milliGRAM(s) Oral two times a day  aspirin  chewable 81 milliGRAM(s) Oral daily  atorvastatin Oral Tab/Cap - Peds 40 milliGRAM(s) Oral daily  BACItracin   Ointment 1 Application(s) Topical two times a day  chlorhexidine 0.12% Liquid 15 milliLiter(s) Oral Mucosa every 12 hours  chlorhexidine 4% Liquid 1 Application(s) Topical <User Schedule>  dexMEDEtomidine Infusion 0.2 MICROgram(s)/kG/Hr (3.3 mL/Hr) IV Continuous <Continuous>  doxercalciferol Injectable 2 MICROGram(s) IV Push <User Schedule>  epoetin mana-epbx (RETACRIT) Injectable 11085 Unit(s) IV Push <User Schedule>  fentaNYL   Infusion. 0.5 MICROgram(s)/kG/Hr (2.9 mL/Hr) IV Continuous <Continuous>  levETIRAcetam  IVPB 375 milliGRAM(s) IV Intermittent every 12 hours  levETIRAcetam  IVPB 250 milliGRAM(s) IV Intermittent <User Schedule>  pantoprazole  Injectable 40 milliGRAM(s) IV Push daily  phenylephrine    Infusion 0.1 MICROgram(s)/kG/Min (1.09 mL/Hr) IV Continuous <Continuous>  polyethylene glycol 3350 17 Gram(s) Oral at bedtime  valproic  acid Syrup 250 milliGRAM(s) Oral two times a day    MEDICATIONS  (PRN):          Xrays:  TLC:  OG:  ET tube:       no infiltrate                                                                               ECHO:  [facility] ICU:

## 2021-04-12 NOTE — PROGRESS NOTE ADULT - ASSESSMENT
Impression:  ARF   cardiac arrest unknown etiology   arrythmia V tach ./ afib   multiple fx , hip, humerus , neck   shock ?? cardiogenic   seizure   HD   left arm dvt     PLAN:    CNS keppra on precedex and fentanyl       HEENT: oral care     PULMONARY: keep same vent setting will do weaning trail post AICD     CARDIOVASCULAR: follow EP for AICD today then will do weaning trial   patient had arrythmia post extubation last time   follow cardiology   check BP from the legs     GI: GI prophylaxis. npo for AICD     RENAL: HD at bed side     INFECTIOUS DISEASE: off abx   repeat bld   procal     HEMATOLOGICAL:  DVT prophylaxis. follow h/h  heparin drip follow PTT   follow right upper ext doppler   ENDOCRINE:  Follow up FS.  Insulin protocol if needed.        CRITICAL CARE TIME SPENT: ***

## 2021-04-13 NOTE — PROGRESS NOTE ADULT - ASSESSMENT
1. Odontoid fracture. Neurosurgery following.  2. R hip / R humerus fracture. Ortho following. ORIF this week.  3. ESRD on HD TTS. HD today 3 hours, opti 160 dialyzer, 3K bath, 2L UF.  4. Anemia. EPO with HD.  5. Secondary hyperparathyroidism. Hectorol with HD.

## 2021-04-13 NOTE — CHART NOTE - NSCHARTNOTEFT_GEN_A_CORE
Registered Dietitian Follow-Up    ***Scroll to the bottom for RD recommendation***    Patient Profile Reviewed                           Yes [x]   No []  Nutrition History Previously Obtained        Yes [x]  No []          PERTINENT SUBJECTIVE INFORMATION:  - pt remains intubated to ventilator  - multiple fx in many locations noted. Plan for OR for hip fx in the near future  - Current bolus feeding is tolerated per RN  - noted pt was constipated, given lactulose and bowel regimen recently  - on IV fentanyl, precedex, small dose ANA.   - Ve 5.9, Temp 37.7c, /62, MAP 84.           PERTINENT MEDICAL INFORMATIONS:  (1) P/w hip fx. dx of cardiopulmomary arrest complicated by C spine fx and hip fx.  (2) s/p Cath 4/9 no intervention. Remains intubated sedated.  (3) Pending Hip Sx.   (4) ESRD on HD. Nephro following          DIET ORDER:  GLUCERNA 1.2 at 300mL q6hr (OG) = 1425 kcal/ 71g protein        ANTHROPOMETRICS:  - Ht.  157.5cm  - Wt.   (4/6): 58kg  (4/12): 58kg  (4/13): 65.3kg - weight trended up this significantly is unlikely, but pt does have 2+ edema, will monitor for now.  - BMI. 23.4  - IBW       PERTINENT LAB DATA:  4/13: h/h 8.1/26.5, BUN 46, Cr 4.2, Glucose 182, Ca 8.4, Albumin 2.7, GFR 10, HgbA1c 5.1.  PERTINENT MEDS:   aspirin, abx, amiodarone, atorvastatin, precedex, fentanyl, protonix, ana, miralax, valproic acid.       PHYSICAL FINDINGS  - APPEARANCE:        intubated to ventilator, 2+ L arm, L hand edema  - GI FUNCTION:        LBM 4/13 per EMR  - TUBES:                   OG  - ORAL/MOUTH:       NPO  - SKIN:                     Ecchymosis        NUTRITION REQUIREMENTS  WEIGHT USED:                          ABW: 58kg (earliest weight)  ESTIMATED ENERGY NEEDS:       CONTINUE [ ]      ADJUST [ x ] - as of 4/13    ESTIMATED ENERGY NEEDS:         1288 kcal/day ([id number])  ESTIMATED PROTEIN NEEDS:        81-98g/day (1.4-1.7 g/kg of ABW) - aim high for HD  ESTIMATED FLUID NEEDS:             Fluid per CCU team    CURRENT NUTRIENT NEEDS:     1425 kcal/ 71g protein = exceeding kcal, meeting only low end protein          [  x] PREVIOUS NUTRITION DIAGNOSIS:    (1) Inadequate protein/energy intake            [ x ] ONGOING        [  ] RESOLVED    NEW (2) Increased nutrient needs related to ESRD as evidenced by on HD 3x/week.        PATIENT INTERVENTION:    [  ] ORAL        [x ] EN/TF     GOAL/EXPECTED OUTCOME:     pt to meet and tolerate >85% of estimated kcal/protein via TF upon f/u in 3 days. Pt to have 1BM/day.   INDICATOR/MONITORING:       RD to monitor diet order, energy intake, body composition, nutrition focused physical findings (EN tolerance, renal profile)  NUTRITION INTERVENTION:        Enteral nutrition              PLAN:  - pt is on dialysis ESRD and current Glucerna 1.2 would not be feasible due to high level of K and Phos.  - If tolerated, switch to NEPRO at 220mL q8hr (each feed run for 2 hr long), with Beneprotein x6/day.   - this gives a total of 1320 kcal / 88g protein/ 481mL free water, additional flushes per CCU team.

## 2021-04-13 NOTE — PROGRESS NOTE ADULT - ATTENDING COMMENTS
Patient seen today with neurocritical care team LIZZ Vasquez.       I was physically present for the key portions of the evaluation and management (E/M) service provided.  I agree with the above history, physical, and plan with the following additions/modifications     Patient remains encephalopathic, no specific neurologic process to explain why she does not awaken and follow command as she did a number of days ago, needs frequent sedation holidays as tolerated to re-establish this, or would recommend repeat brain imaging with MRI if she renteria snot improve to rule out new structural processes.    Sergio Bolanos MD  Attending Neurointensivist

## 2021-04-13 NOTE — PROGRESS NOTE ADULT - SUBJECTIVE AND OBJECTIVE BOX
HPI  Patient is a 70y old Female who presents with a chief complaint of cardiac arrest (12 Apr 2021 13:18)    Currently admitted to medicine with the primary diagnosis of Cardiopulmonary arrest       Today is hospital day 12d.     INTERVAL HPI / OVERNIGHT EVENTS:  Patient was examined and seen at bedside. This morning patient intubated and sedated. S/p BIV ICD placement.    ROS: Otherwise unremarkable     PAST MEDICAL & SURGICAL HISTORY  Diabetes    Congestive heart failure (CHF)    Pleural effusion due to congestive heart failure    End stage renal disease  on dialysis Mon, Wed, Fr    Uterine prolapse    Chronic indwelling Castellon catheter    History of repair of hip fracture  2019    S/P CABG (coronary artery bypass graft)  11/2019    History of thoracentesis    Chronic indwelling Castellon catheter      ALLERGIES  No Known Allergies    MEDICATIONS  STANDING MEDICATIONS  aMIOdarone    Tablet 200 milliGRAM(s) Oral two times a day  aspirin  chewable 81 milliGRAM(s) Oral daily  atorvastatin Oral Tab/Cap - Peds 40 milliGRAM(s) Oral daily  BACItracin   Ointment 1 Application(s) Topical two times a day  ceFAZolin   IVPB 1000 milliGRAM(s) IV Intermittent every 8 hours  chlorhexidine 0.12% Liquid 15 milliLiter(s) Oral Mucosa every 12 hours  chlorhexidine 4% Liquid 1 Application(s) Topical <User Schedule>  dexMEDEtomidine Infusion 0.2 MICROgram(s)/kG/Hr IV Continuous <Continuous>  doxercalciferol Injectable 2 MICROGram(s) IV Push <User Schedule>  epoetin mana-epbx (RETACRIT) Injectable 94993 Unit(s) IV Push <User Schedule>  fentaNYL   Infusion. 0.5 MICROgram(s)/kG/Hr IV Continuous <Continuous>  lactulose Syrup 15 Gram(s) Oral three times a day  levETIRAcetam  IVPB 375 milliGRAM(s) IV Intermittent every 12 hours  levETIRAcetam  IVPB 250 milliGRAM(s) IV Intermittent <User Schedule>  pantoprazole  Injectable 40 milliGRAM(s) IV Push daily  phenylephrine    Infusion 0.1 MICROgram(s)/kG/Min IV Continuous <Continuous>  polyethylene glycol 3350 17 Gram(s) Oral at bedtime  valproic  acid Syrup 250 milliGRAM(s) Oral two times a day    PRN MEDICATIONS    VITALS:  T(F): 99.8  HR: 88  BP: --  RR: 18  SpO2: 100%    PHYSICAL EXAM  GEN: NAD, Resting comfortably in bed  PULM: Clear to auscultation bilaterally, No wheezes  CVS: Regular rate and rhythm, S1-S2, no murmurs  ABD: Soft, non-tender, non-distended, no guarding  EXT: No edema  NEURO: A&Ox3, no focal deficits    LABS                        8.1    8.59  )-----------( 192      ( 13 Apr 2021 04:40 )             26.5     04-13    138  |  100  |  46<H>  ----------------------------<  182<H>  4.6   |  22  |  4.2<HH>    Ca    8.4<L>      13 Apr 2021 04:40  Mg     2.1     04-13    TPro  5.4<L>  /  Alb  2.7<L>  /  TBili  0.2  /  DBili  x   /  AST  16  /  ALT  <5  /  AlkPhos  188<H>  04-13    PT/INR - ( 13 Apr 2021 04:40 )   PT: 16.80 sec;   INR: 1.46 ratio         PTT - ( 13 Apr 2021 04:40 )  PTT:29.3 sec    ABG - ( 13 Apr 2021 03:26 )  pH, Arterial: 7.37  pH, Blood: x     /  pCO2: 44    /  pO2: 193   / HCO3: 25    / Base Excess: x     /  SaO2: 100           RADIOLOGY    Assessment and Plan:   · Assessment	  71 y/o F with a pmhx of CAD s/p CABG (Nov 2019), HFrEF (EF 20%), AS, ESRD (MWF), bladder prolapse requiring chronic Castellon, Diabetes Type 2 on insulin presents from home s/p mechanical fall. Trauma workup in ED significant for R humeral fracture / R hip fracture / non displaced odontoid fracture/ acute rib fractures/ ? T6 vertebral fracture. In the ED patient arrested, ROSC achieved after 2 minutes of CPR. EKG at the time showed RBBB with wide complex tachy. Pt subsequently intubated and sedated, started on pressors. Pt Experienced seizure-like episode during weaning trial s/p 2mg Versed.  (4/2/21) Weaning trial passed. Patient was Extubated . 02 sat began to decrease to low 80s, tachypenic and cyanotic started on bipap with no improvement. Patient again became hypotensive and tachy. Went into Afib. s/p cardioversion x3 and started on amio     # RT Humerus/Rt femur fracture/Rt Hip fracture   - Ortho following  - Pain control. Currently on Fentanyl drip   - Patient taken to the OR 4/8. Procedure cancelled because patient went into SVT, s/p adenosine. Started on amio at the time   - Spoke to Ortho today. OR for tomorrow       #Cardiac arrest in the setting of arythmia and CHF exacerbation s/p BIV ICD placement   - s/p LHC (4/9) - Patent LIMA and SVG grafts, Transaortic gradient ~ 10-15mmHg  - Trops .22-->.24 -->.21-->.34 (4/4/21)  - CTA negative for PE   - Lower extremity duplex negative for DVT( 4/6/21)  - S/p BIV ICD placement. holding heparin until CT surg reevaluates  - started on Ancef IV 1000mg        #LUE DVT  - UE duplex : Thrombus noted in the left brachial vein.  - Started on Heparin   - holding heparin until CT surg reevaluates post procedure     # Seizure unknown etiology  - CT Head (4/1) negative   - Keppra 500mg adminstered. followed by 375mg BID with additional 250mg dose after dialysis  - Depakote 250mg PO BID   - VEEG - bifrontal sharps but no seizure activity. Consistent with metabolic process.     # Aspiration Pneumonia vs Adenovirus Pneumonia   - CXR : Stable bilateral opacities. No pneumothorax. Support devices, in satisfactory position.  - s/p Completion of ceftriaxone       # Perirectal abscess  - surgery is following patient. Dr Mota   - s/p debridement over a month. No need for packing as per surgery     #Odontoid fracture   - Neurosurgery following patient   - Want to c/w collar      #ESRD  - Nephro following patient  - HD for today. With EPO and Hecterol     # h/o DM   - fingersticks tid  - hold home insulin  - insulin protocol if > 180    # DVT PPX: heparin subq  # GI PPX: PPI  # Diet: NPO with tube feeds Glucerna   # Activity: bedrest  # Bowel regimen         HPI  Patient is a 70y old Female who presents with a chief complaint of cardiac arrest (12 Apr 2021 13:18)    Currently admitted to medicine with the primary diagnosis of Cardiopulmonary arrest       Today is hospital day 12d.     INTERVAL HPI / OVERNIGHT EVENTS:  Patient was examined and seen at bedside. This morning patient intubated and sedated. S/p BIV ICD placement.    ROS: Otherwise unremarkable     PAST MEDICAL & SURGICAL HISTORY  Diabetes    Congestive heart failure (CHF)    Pleural effusion due to congestive heart failure    End stage renal disease  on dialysis Mon, Wed, Fr    Uterine prolapse    Chronic indwelling Castellon catheter    History of repair of hip fracture  2019    S/P CABG (coronary artery bypass graft)  11/2019    History of thoracentesis    Chronic indwelling Castellon catheter      ALLERGIES  No Known Allergies    MEDICATIONS  STANDING MEDICATIONS  aMIOdarone    Tablet 200 milliGRAM(s) Oral two times a day  aspirin  chewable 81 milliGRAM(s) Oral daily  atorvastatin Oral Tab/Cap - Peds 40 milliGRAM(s) Oral daily  BACItracin   Ointment 1 Application(s) Topical two times a day  ceFAZolin   IVPB 1000 milliGRAM(s) IV Intermittent every 8 hours  chlorhexidine 0.12% Liquid 15 milliLiter(s) Oral Mucosa every 12 hours  chlorhexidine 4% Liquid 1 Application(s) Topical <User Schedule>  dexMEDEtomidine Infusion 0.2 MICROgram(s)/kG/Hr IV Continuous <Continuous>  doxercalciferol Injectable 2 MICROGram(s) IV Push <User Schedule>  epoetin mana-epbx (RETACRIT) Injectable 94827 Unit(s) IV Push <User Schedule>  fentaNYL   Infusion. 0.5 MICROgram(s)/kG/Hr IV Continuous <Continuous>  lactulose Syrup 15 Gram(s) Oral three times a day  levETIRAcetam  IVPB 375 milliGRAM(s) IV Intermittent every 12 hours  levETIRAcetam  IVPB 250 milliGRAM(s) IV Intermittent <User Schedule>  pantoprazole  Injectable 40 milliGRAM(s) IV Push daily  phenylephrine    Infusion 0.1 MICROgram(s)/kG/Min IV Continuous <Continuous>  polyethylene glycol 3350 17 Gram(s) Oral at bedtime  valproic  acid Syrup 250 milliGRAM(s) Oral two times a day    PRN MEDICATIONS    VITALS:  T(F): 99.8  HR: 88  BP: --  RR: 18  SpO2: 100%    PHYSICAL EXAM  GEN: NAD, Resting comfortably in bed  PULM: Clear to auscultation bilaterally, No wheezes  CVS: Regular rate and rhythm, S1-S2, no murmurs  ABD: Soft, non-tender, non-distended, no guarding  EXT: No edema  NEURO: A&Ox3, no focal deficits    LABS                        8.1    8.59  )-----------( 192      ( 13 Apr 2021 04:40 )             26.5     04-13    138  |  100  |  46<H>  ----------------------------<  182<H>  4.6   |  22  |  4.2<HH>    Ca    8.4<L>      13 Apr 2021 04:40  Mg     2.1     04-13    TPro  5.4<L>  /  Alb  2.7<L>  /  TBili  0.2  /  DBili  x   /  AST  16  /  ALT  <5  /  AlkPhos  188<H>  04-13    PT/INR - ( 13 Apr 2021 04:40 )   PT: 16.80 sec;   INR: 1.46 ratio         PTT - ( 13 Apr 2021 04:40 )  PTT:29.3 sec    ABG - ( 13 Apr 2021 03:26 )  pH, Arterial: 7.37  pH, Blood: x     /  pCO2: 44    /  pO2: 193   / HCO3: 25    / Base Excess: x     /  SaO2: 100           RADIOLOGY

## 2021-04-13 NOTE — PROGRESS NOTE ADULT - ASSESSMENT
Assessment: 69 yo F with PMH of CAD s/p CABG (November 2019), HFrEF (EF 20%), AS, ESRD (MWF), bladder prolapse requiring chronic Castellon, Diabetes Type 2 on insulin presents from home s/p mechanical fall with Multiple traumas (R humeral fracture / R hip fracture / non displaced odontoid fracture/ acute rib fractures/ ? T6 vertebral fracture). Found to be pulseless in trauma bay (not on monitor) with ROSC after 2 min. Seizurelike activities when sedation held, confirmed on vEEG. Extubated 4/2, followed by arrhythmia AF vs VT was reintubated and cardioverted. Patient with episode of AF RVR prior to ORIF on 4/8, was cancelled.  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, RBBB, LAFB)  Hx of GI bleed  Acute decompensated CHF, Ischemic CMP  Anemia acute on chronic  CAD s/p CABG in 2019, NUBIA clip   Patent grafts by cath  Echo with Severe AS, Moderate to severe MR , EF 35%  Hx ESRD on HD  Hx of GI bleed    Plan:  - CXR completed  - Interrogation completed, numbers within appropriate range  - Continue medications  - con't Amio PO 200mg q12h  - restart Metoprolol when BP tolerates  - Wound care as per CT surgery  - Do not lift left arm above shoulder height for 6 weeks  - Follow up with Dr Solo 5/18 at 9:30 am  [address]  [facility] [id number] [phone]

## 2021-04-13 NOTE — PROGRESS NOTE ADULT - SUBJECTIVE AND OBJECTIVE BOX
Patient is a 70y old  Female who presents with a chief complaint of cardiac arrest (12 Apr 2021 13:18)      Over Night Events:  Patient seen and examined.   s/p AICD by ct surgery on fentanyl, precedex     ROS:  See HPI    PHYSICAL EXAM    ICU Vital Signs Last 24 Hrs  T(C): 37.6 (13 Apr 2021 04:00), Max: 37.6 (13 Apr 2021 04:00)  T(F): 99.7 (13 Apr 2021 04:00), Max: 99.7 (13 Apr 2021 04:00)  HR: 96 (13 Apr 2021 07:42) (80 - 96)  BP: --  BP(mean): --  ABP: 106/60 (13 Apr 2021 06:00) (94/48 - 138/70)  ABP(mean): 78 (13 Apr 2021 06:00) (66 - 96)  RR: 16 (13 Apr 2021 06:00) (12 - 17)  SpO2: 100% (13 Apr 2021 07:42) (98% - 100%)      General: open eyes   HEENT:    et tube             Lymph Nodes: NO cervical LN   Lungs: Bilateral BS  Cardiovascular: Regular   Abdomen: Soft, Positive BS  Extremities: No clubbing   Skin: warm   Neurological: no focal   Musculoskeletal: move all ext     I&O's Detail    12 Apr 2021 07:01  -  13 Apr 2021 07:00  --------------------------------------------------------  IN:    Dexmedetomidine: 88 mL    Dexmedetomidine: 213.9 mL    Enteral Tube Flush: 210 mL    FentaNYL: 52.5 mL    FentaNYL: 73 mL    Glucerna: 900 mL    Heparin: 13 mL    IV PiggyBack: 400 mL    Phenylephrine: 10 mL    Phenylephrine: 37.5 mL  Total IN: 1997.9 mL    OUT:    Indwelling Catheter - Urethral (mL): 58 mL  Total OUT: 58 mL    Total NET: 1939.9 mL          LABS:                          8.1    8.59  )-----------( 192      ( 13 Apr 2021 04:40 )             26.5         13 Apr 2021 04:40    138    |  100    |  46     ----------------------------<  182    4.6     |  22     |  4.2      Ca    8.4        13 Apr 2021 04:40  Mg     2.1       13 Apr 2021 04:40    TPro  5.4    /  Alb  2.7    /  TBili  0.2    /  DBili  x      /  AST  16     /  ALT  <5     /  AlkPhos  188    13 Apr 2021 04:40  Amylase x     lipase x                                                 PT/INR - ( 13 Apr 2021 04:40 )   PT: 16.80 sec;   INR: 1.46 ratio         PTT - ( 13 Apr 2021 04:40 )  PTT:29.3 sec                                                                                                                                                Mode: AC/ CMV (Assist Control/ Continuous Mandatory Ventilation)  RR (machine): 12  TV (machine): 350  FiO2: 30  PEEP: 5  ITime: 1  MAP: 9  PIP: 19                                      ABG - ( 13 Apr 2021 03:26 )  pH, Arterial: 7.37  pH, Blood: x     /  pCO2: 44    /  pO2: 193   / HCO3: 25    / Base Excess: x     /  SaO2: 100                 MEDICATIONS  (STANDING):  aMIOdarone    Tablet 200 milliGRAM(s) Oral two times a day  aspirin  chewable 81 milliGRAM(s) Oral daily  atorvastatin Oral Tab/Cap - Peds 40 milliGRAM(s) Oral daily  BACItracin   Ointment 1 Application(s) Topical two times a day  ceFAZolin   IVPB 1000 milliGRAM(s) IV Intermittent every 8 hours  chlorhexidine 0.12% Liquid 15 milliLiter(s) Oral Mucosa every 12 hours  chlorhexidine 4% Liquid 1 Application(s) Topical <User Schedule>  dexMEDEtomidine Infusion 0.2 MICROgram(s)/kG/Hr (3.3 mL/Hr) IV Continuous <Continuous>  doxercalciferol Injectable 2 MICROGram(s) IV Push <User Schedule>  epoetin mana-epbx (RETACRIT) Injectable 39843 Unit(s) IV Push <User Schedule>  fentaNYL   Infusion. 0.5 MICROgram(s)/kG/Hr (2.9 mL/Hr) IV Continuous <Continuous>  lactulose Syrup 15 Gram(s) Oral three times a day  levETIRAcetam  IVPB 375 milliGRAM(s) IV Intermittent every 12 hours  levETIRAcetam  IVPB 250 milliGRAM(s) IV Intermittent <User Schedule>  pantoprazole  Injectable 40 milliGRAM(s) IV Push daily  phenylephrine    Infusion 0.1 MICROgram(s)/kG/Min (1.09 mL/Hr) IV Continuous <Continuous>  polyethylene glycol 3350 17 Gram(s) Oral at bedtime  valproic  acid Syrup 250 milliGRAM(s) Oral two times a day    MEDICATIONS  (PRN):          Xrays:  TLC:  OG:  ET tube:                                                                                    system down    ECHO:  CAM ICU:

## 2021-04-13 NOTE — PROGRESS NOTE ADULT - SUBJECTIVE AND OBJECTIVE BOX
Neurocritical Care Progress Note    GUS WILBURN    70y     [id number]     Daily Weight in k.3 (2021 06:00)  COVID-19 PCR: NotDetec (2021 12:50)  COVID-19 PCR: NotDetec (2021 06:41)  COVID-19 PCR: NotDetec (2021 16:59)  SARS-CoV-2: NotDetec (2021 06:10)  COVID-19 PCR: NotDetec (09 Mar 2021 11:17)  SARS-CoV-2: NotDetec (02 Mar 2021 15:40)    Patient is a 70y old  Female who presents with a chief complaint of cardiac arrest (2021 13:18)    HPI:  71 yo F with PMH of CAD s/p CABG (2019), HFrEF (EF 20%), AS, ESRD (MWF), bladder prolapse requiring chronic Castellon, Diabetes Type 2 on insulin presents from home s/p mechanical fall. When the history was taken in the emergency department, she The denied any dizziness/ palpitations/ aura/vertigo. She reported non-radiating R shoulder pain and non-radiating R hip pain on arrival. She was found to have R humeral fracture / R hip fracture / non displaced odontoid fracture/ acute rib fractures/ ? T6 vertebral fracture.     In the emergency department, while the patient was receiving trauma work up she was found pulseless by a PCA in the room ( she was not on the monitor at that time) and had a cardiac arrest. ROSC was achieved after 2 minutes of CPR (1 epinephrine was given and 1 calcium chloride iv). Patient was intubated and sedated. EKG showed RBBB with wide complex tachycardia. Per family at the bedside, the patient had her last hemodialysis yesterday and has been relatively non compliant with her medications and medical care at home. Unable to obtain ros as patient is sedated.     T(C): 35 , HR: 60, BP: 158/67, RR: 20, SpO2: 99% vented  Labs: d-dimer ~ 4700, p-bnp 70,000, trop (0.20 <--0.17)  ABG pH, Arterial: 7.45, CO2: 36, Arterial O2: 63 , HCO3: 25 , Base Excess: 1.4 , SaO2: 94      CTH (-) (2021 07:42)    Allergies:  No Known Allergies    PAST MEDICAL & SURGICAL HISTORY:  Diabetes    Congestive heart failure (CHF)    Pleural effusion due to congestive heart failure    End stage renal disease  on dialysis Mon, Wed, Fr    Uterine prolapse    Chronic indwelling Castellon catheter    History of repair of hip fracture      S/P CABG (coronary artery bypass graft)  2019    History of thoracentesis    Chronic indwelling Castellon catheter    Home Medications:  aspirin 81 mg oral tablet, chewable: 1 tab(s) orally once a day (2021 11:06)  atorvastatin 40 mg oral tablet: 1 tab(s) orally once a day (2021 11:06)  INSULIN LISPRO KWIKPEN  100 UNIT/ML SOPN:  (2021 11:06)  METOLAZONE  5 MG TABS: 1  orally once a day (2021 11:06)  METOPROLOL SUCCINATE ER  25 MG TB24: 1  orally 2 times a day (2021 11:06)  SERTRALINE HCL  50 MG TABS:  (2021 11:06)  SEVELAMER CARBONATE 800MG TABLETS: TK 2 TS PO TID WITH MEALS (2021 11:06)  TORSEMIDE 20MG TABLETS: TK 3 TS PO BID (2021 11:06)  VITAMIN D2 79635YR (ERGO) CAP RX: TK 1 C PO WEEKLY (2021 11:06)    24 Hour Events:  Patient remains intubated and sedated s/p cardiac arrest on . Not following commands, even while on a sedation vacation. No ON events.     Exam:  Mentation: Sedated on precedex 1.2 mcg/kg and fentanyl 0.7 mcg/kg. Minimal eye opening to noxious stimuli, not following commands. Language KAREN due to presence of ET tube.   Cranial Nerves:  	II – Inconsistently blinks to threat, so unsure if the blink is a reflex or a true blink  	III/IV/VI – 3 mm b/l reactive pupils. Left skew noted left eye  	V – Corneals present b/l  	VII – Difficult to assess facial palsy due to ET securement device  	VIII – No nystagmus. oculocephalics +  	IX/X – Cough to ET suction present (KAREN if palate rises symetrically or if uvula rises midline)  	XI – Deferred (KAREN SCM strength/symmetry)  	XII – Deferred (KAREN if tongue protrusion is at midline)  Motor: Flaccid, no tremors. No posturing witnessed. No spontaneous movement  Sensory: No trace withdrawal or localization, however, grimaces to painful stimuli   Cerebellum: KAREN dysmetria. Gait deferred    Current Medications:  aMIOdarone    Tablet 200 milliGRAM(s) Oral two times a day  aspirin  chewable 81 milliGRAM(s) Oral daily  atorvastatin Oral Tab/Cap - Peds 40 milliGRAM(s) Oral daily  BACItracin   Ointment 1 Application(s) Topical two times a day  chlorhexidine 0.12% Liquid 15 milliLiter(s) Oral Mucosa every 12 hours  chlorhexidine 4% Liquid 1 Application(s) Topical <User Schedule>  dexMEDEtomidine Infusion 0.2 MICROgram(s)/kG/Hr IV Continuous <Continuous>  doxercalciferol Injectable 2 MICROGram(s) IV Push <User Schedule>  epoetin mana-epbx (RETACRIT) Injectable 32706 Unit(s) IV Push <User Schedule>  fentaNYL   Infusion. 0.5 MICROgram(s)/kG/Hr IV Continuous <Continuous>  lactulose Syrup 15 Gram(s) Oral three times a day  levETIRAcetam  IVPB 375 milliGRAM(s) IV Intermittent every 12 hours  levETIRAcetam  IVPB 250 milliGRAM(s) IV Intermittent <User Schedule>  metoprolol tartrate Injectable 5 milliGRAM(s) IV Push once  pantoprazole  Injectable 40 milliGRAM(s) IV Push daily  phenylephrine    Infusion 0.1 MICROgram(s)/kG/Min IV Continuous <Continuous>  polyethylene glycol 3350 17 Gram(s) Oral at bedtime  valproic  acid Syrup 250 milliGRAM(s) Oral two times a day    Vital Signs Last 24 Hrs  T(C): 37.6 (2021 12:00), Max: 37.7 (2021 08:00)  T(F): 99.7 (2021 12:00), Max: 99.8 (2021 08:00)  HR: 136 (2021 14:54) (80 - 136)  BP: --  BP(mean): --  RR: 18 (2021 13:00) (12 - 18)  SpO2: 91% (2021 14:54) (91% - 100%)     I/Os:  I&O's Detail    2021 07:01  -  2021 07:00  --------------------------------------------------------  IN:    Dexmedetomidine: 213.9 mL    Dexmedetomidine: 88 mL    Enteral Tube Flush: 210 mL    FentaNYL: 52.5 mL    FentaNYL: 73 mL    Glucerna: 900 mL    Heparin: 13 mL    IV PiggyBack: 400 mL    Phenylephrine: 37.5 mL    Phenylephrine: 10 mL  Total IN: 1997.9 mL    OUT:    Indwelling Catheter - Urethral (mL): 58 mL  Total OUT: 58 mL    Total NET: 1939.9 mL      2021 07:01  -  2021 15:04  --------------------------------------------------------  IN:    Dexmedetomidine: 87 mL    FentaNYL: 16.4 mL    IV PiggyBack: 50 mL    Phenylephrine: 15 mL  Total IN: 168.4 mL    OUT:    Indwelling Catheter - Urethral (mL): 35 mL    Other (mL): 1000 mL  Total OUT: 1035 mL    Total NET: -866.6 mL    Labs:  ABG - ( 2021 03:26 )  pH, Arterial: 7.37  pH, Blood: x     /  pCO2: 44    /  pO2: 193   / HCO3: 25    / Base Excess: x     /  SaO2: 100       Mode: AC/ CMV (Assist Control/ Continuous Mandatory Ventilation)  RR (machine): 12  TV (machine): 350  FiO2: 30  PEEP: 5  ITime: 1  MAP: 9  PIP: 19    LIVER FUNCTIONS - ( 2021 04:40 )  Alb: 2.7 g/dL / Pro: 5.4 g/dL / ALK PHOS: 188 U/L / ALT: <5 U/L / AST: 16 U/L / GGT: x                                 8.1    8.59  )-----------( 192      ( 2021 04:40 )             26.5      04-13    138  |  100  |  46<H>  ----------------------------<  182<H>  4.6   |  22  |  4.2<HH>    Ca    8.4<L>      2021 04:40  Mg     2.1         TPro  5.4<L>  /  Alb  2.7<L>  /  TBili  0.2  /  DBili  x   /  AST  16  /  ALT  <5  /  AlkPhos  188<H>       PT/INR - ( 2021 04:40 )   PT: 16.80 sec;   INR: 1.46 ratio      PTT - ( 2021 04:40 )  PTT:29.3 sec    Diagnostics/ Results:  Last CTH:     Last CTA/MRA:    Last CTP:    Last MRI:    Last TCD:    Last EEG:    Last Echo:    Last EKG:    Chest Xray:    ROS:  [X] A ten-point ROS was otherwise negative except as noted in HPI    Assessment:      Plan:  Neurology:      Respiratory:  -Keep HOB > 35; aspiration precautions     Cardiology:      Vascular:      GI/Nutrition:  -Diet, NPO with Tube Feed:   Tube Feeding Modality: Orogastric  Nepro with Carb Steady  Total Volume for 24 Hours (mL): 660  Bolus  Total Volume of Bolus (mL):  220  Total # of Feeds: 3  Tube Feed Frequency: Every 8 hours   Tube Feed Start Time: 13:00  Bolus Feed Rate (mL per Hour): 110   Bolus Feed Duration (in Hours): 2 (21 @ 12:01) [Active]  Diet, NPO with Tube Feed:   Tube Feeding Modality: Orogastric  Nepro with Carb Steady  Total Volume for 24 Hours (mL): 660  Bolus  Total Volume of Bolus (mL):  220  Tube Feed Frequency: Every 8 hours   Tube Feed Start Time: 14:00  Bolus Feed Rate (mL per Hour): 110   Bolus Feed Duration (in Hours): 2  Bolus Feed Instructions:  allow each feed to run for up to 2 hour long. flushes per nephro/CCU team  Beneprotein ([facility] Only)     Qty per Day:  6 (21 @ 10:03) [Pending Verification By Attending]        -Bowel Regimen ->  -PPx -> Pantoprazole       / Renal/ Fluids/ Electrolytes:      Hematology/ Oncology:  -DVT PPx -> Heparin SQ    Lovenox SQ    Heparin gtt     SCDs      Infectious Disease:      Musculoskeletol:      Endocrine:      Tubes/ Lines/ Drains:  Central    Peripheral    A-line    Castellon    NGT/ OGT    Chest    EVD    Disposition:  Continue medical management as per primary team in:   ICU   CCU   SICU   Stroke Unit   Stepdown    CODE STATUS w/ Next of Kin:   DNI   DNR   FULL    Patient seen and case discussed with NCC attending; see attending attestation  Please call w/ questions  Indu Vasquez NP  [phone]             Neurocritical Care Progress Note    GUS WILBURN    70y     [id number]     Daily Weight in k.3 (2021 06:00)  COVID-19 PCR: NotDetec (2021 12:50)  COVID-19 PCR: NotDetec (2021 06:41)  COVID-19 PCR: NotDetec (2021 16:59)  SARS-CoV-2: NotDetec (2021 06:10)  COVID-19 PCR: NotDetec (09 Mar 2021 11:17)  SARS-CoV-2: NotDetec (02 Mar 2021 15:40)    Patient is a 70y old  Female who presents with a chief complaint of cardiac arrest (2021 13:18)    HPI:  69 yo F with PMH of CAD s/p CABG (2019), HFrEF (EF 20%), AS, ESRD (MWF), bladder prolapse requiring chronic Castellon, Diabetes Type 2 on insulin presents from home s/p mechanical fall. When the history was taken in the emergency department, she The denied any dizziness/ palpitations/ aura/vertigo. She reported non-radiating R shoulder pain and non-radiating R hip pain on arrival. She was found to have R humeral fracture / R hip fracture / non displaced odontoid fracture/ acute rib fractures/ ? T6 vertebral fracture.     In the emergency department, while the patient was receiving trauma work up she was found pulseless by a PCA in the room ( she was not on the monitor at that time) and had a cardiac arrest. ROSC was achieved after 2 minutes of CPR (1 epinephrine was given and 1 calcium chloride iv). Patient was intubated and sedated. EKG showed RBBB with wide complex tachycardia. Per family at the bedside, the patient had her last hemodialysis yesterday and has been relatively non compliant with her medications and medical care at home. Unable to obtain ros as patient is sedated.     T(C): 35 , HR: 60, BP: 158/67, RR: 20, SpO2: 99% vented  Labs: d-dimer ~ 4700, p-bnp 70,000, trop (0.20 <--0.17)  ABG pH, Arterial: 7.45, CO2: 36, Arterial O2: 63 , HCO3: 25 , Base Excess: 1.4 , SaO2: 94      CTH (-) (2021 07:42)    Allergies:  No Known Allergies    PAST MEDICAL & SURGICAL HISTORY:  Diabetes    Congestive heart failure (CHF)    Pleural effusion due to congestive heart failure    End stage renal disease  on dialysis Mon, Wed, Fr    Uterine prolapse    Chronic indwelling Castellon catheter    History of repair of hip fracture      S/P CABG (coronary artery bypass graft)  2019    History of thoracentesis    Chronic indwelling Castellon catheter    Home Medications:  aspirin 81 mg oral tablet, chewable: 1 tab(s) orally once a day (2021 11:06)  atorvastatin 40 mg oral tablet: 1 tab(s) orally once a day (2021 11:06)  INSULIN LISPRO KWIKPEN  100 UNIT/ML SOPN:  (2021 11:06)  METOLAZONE  5 MG TABS: 1  orally once a day (2021 11:06)  METOPROLOL SUCCINATE ER  25 MG TB24: 1  orally 2 times a day (2021 11:06)  SERTRALINE HCL  50 MG TABS:  (2021 11:06)  SEVELAMER CARBONATE 800MG TABLETS: TK 2 TS PO TID WITH MEALS (2021 11:06)  TORSEMIDE 20MG TABLETS: TK 3 TS PO BID (2021 11:06)  VITAMIN D2 95654LA (ERGO) CAP RX: TK 1 C PO WEEKLY (2021 11:06)    24 Hour Events:  Patient remains intubated and sedated s/p cardiac arrest on . Not following commands, even while on a sedation vacation. No ON events.     Exam:  Mentation: Sedated on precedex 1.2 mcg/kg and fentanyl 0.7 mcg/kg. Minimal eye opening to noxious stimuli, not following commands. Language KAREN due to presence of ET tube.   Cranial Nerves:  	II – Inconsistently blinks to threat, so unsure if the blink is a reflex or a true blink  	III/IV/VI – 3 mm b/l reactive pupils. Left skew noted left eye  	V – Corneals present b/l  	VII – Difficult to assess facial palsy due to ET securement device  	VIII – No nystagmus. oculocephalics +  	IX/X – Cough to ET suction present (KAREN if palate rises symetrically or if uvula rises midline)  	XI – Deferred (KAREN SCM strength/symmetry)  	XII – Deferred (KAREN if tongue protrusion is at midline)  Motor: Flaccid, no tremors. No posturing witnessed. No spontaneous movement  Sensory: No trace withdrawal or localization, however, grimaces to painful stimuli   Cerebellum: KAREN dysmetria. Gait deferred    Current Medications:  aMIOdarone    Tablet 200 milliGRAM(s) Oral two times a day  aspirin  chewable 81 milliGRAM(s) Oral daily  atorvastatin Oral Tab/Cap - Peds 40 milliGRAM(s) Oral daily  BACItracin   Ointment 1 Application(s) Topical two times a day  chlorhexidine 0.12% Liquid 15 milliLiter(s) Oral Mucosa every 12 hours  chlorhexidine 4% Liquid 1 Application(s) Topical <User Schedule>  dexMEDEtomidine Infusion 0.2 MICROgram(s)/kG/Hr IV Continuous <Continuous>  doxercalciferol Injectable 2 MICROGram(s) IV Push <User Schedule>  epoetin mana-epbx (RETACRIT) Injectable 58643 Unit(s) IV Push <User Schedule>  fentaNYL   Infusion. 0.5 MICROgram(s)/kG/Hr IV Continuous <Continuous>  lactulose Syrup 15 Gram(s) Oral three times a day  levETIRAcetam  IVPB 375 milliGRAM(s) IV Intermittent every 12 hours  levETIRAcetam  IVPB 250 milliGRAM(s) IV Intermittent <User Schedule>  metoprolol tartrate Injectable 5 milliGRAM(s) IV Push once  pantoprazole  Injectable 40 milliGRAM(s) IV Push daily  phenylephrine    Infusion 0.1 MICROgram(s)/kG/Min IV Continuous <Continuous>  polyethylene glycol 3350 17 Gram(s) Oral at bedtime  valproic  acid Syrup 250 milliGRAM(s) Oral two times a day    Vital Signs Last 24 Hrs  T(C): 37.6 (2021 12:00), Max: 37.7 (2021 08:00)  T(F): 99.7 (2021 12:00), Max: 99.8 (2021 08:00)  HR: 136 (2021 14:54) (80 - 136)  BP: --  BP(mean): --  RR: 18 (2021 13:00) (12 - 18)  SpO2: 91% (2021 14:54) (91% - 100%)     I/Os:  I&O's Detail    2021 07:01  -  2021 07:00  --------------------------------------------------------  IN:    Dexmedetomidine: 213.9 mL    Dexmedetomidine: 88 mL    Enteral Tube Flush: 210 mL    FentaNYL: 52.5 mL    FentaNYL: 73 mL    Glucerna: 900 mL    Heparin: 13 mL    IV PiggyBack: 400 mL    Phenylephrine: 37.5 mL    Phenylephrine: 10 mL  Total IN: 1997.9 mL    OUT:    Indwelling Catheter - Urethral (mL): 58 mL  Total OUT: 58 mL    Total NET: 1939.9 mL      2021 07:01  -  2021 15:04  --------------------------------------------------------  IN:    Dexmedetomidine: 87 mL    FentaNYL: 16.4 mL    IV PiggyBack: 50 mL    Phenylephrine: 15 mL  Total IN: 168.4 mL    OUT:    Indwelling Catheter - Urethral (mL): 35 mL    Other (mL): 1000 mL  Total OUT: 1035 mL    Total NET: -866.6 mL    Labs:  ABG - ( 2021 03:26 )  pH, Arterial: 7.37  pH, Blood: x     /  pCO2: 44    /  pO2: 193   / HCO3: 25    / Base Excess: x     /  SaO2: 100       Mode: AC/ CMV (Assist Control/ Continuous Mandatory Ventilation)  RR (machine): 12  TV (machine): 350  FiO2: 30  PEEP: 5  ITime: 1  MAP: 9  PIP: 19    LIVER FUNCTIONS - ( 2021 04:40 )  Alb: 2.7 g/dL / Pro: 5.4 g/dL / ALK PHOS: 188 U/L / ALT: <5 U/L / AST: 16 U/L / GGT: x                                 8.1    8.59  )-----------( 192      ( 2021 04:40 )             26.5      04-13    138  |  100  |  46<H>  ----------------------------<  182<H>  4.6   |  22  |  4.2<HH>    Ca    8.4<L>      2021 04:40  Mg     2.1         TPro  5.4<L>  /  Alb  2.7<L>  /  TBili  0.2  /  DBili  x   /  AST  16  /  ALT  <5  /  AlkPhos  188<H>       PT/INR - ( 2021 04:40 )   PT: 16.80 sec;   INR: 1.46 ratio      PTT - ( 2021 04:40 )  PTT:29.3 sec    Diagnostics/ Results:  Last CTH: < from: CT Head No Cont (21 @ 21:57) >  IMPRESSION:    No CT evidence of acute intracranial pathology.    < end of copied text >    Last CTA/MRA: n/a    Last CTP: n/a    Last MRI: n/a    Last TCD: n/a    Last EEG: VEEG in the last 24 hours:    Background----------  continues, less than optimally organized, reactive and reaching frequencies in the range of 6-7 hz    Focal and generalized slowing-------------   moderate generalized slowing                                                                 bifrontal slowing  Interictal activity---------  large amount of diffusely expressed triphasic waves that some are clealy epileptiform in nature and are stimulus dependent. This activity occasionally appears in psuedo-periodic pattern    Events---------------  none    Seizures------------  none    Impression:  1- encephalopathy  2- potential for seizure that is not lateralizing    Last Echo: < from: TTE Echo Complete w/o Contrast w/ Doppler (21 @ 10:24) >  Summary:   1. LV Ejection Fraction by Renner's Method with a biplane EF of 38 %.   2. Moderately decreased global left ventricular systolic function.   3. Entire inferior wall appears hypokinetic.   4. Mild concentric left ventricular hypertrophy.   5. Severely reduced RV systolic function.   6. Severely enlarged right ventricle.   7. Severely enlarged left atrium.   8. Peak transaortic gradient equals 48.8 mmHg, mean transaortic gradient equals 24.7 mmHg, the calculated aortic valve area equals 0.75 cm² by the continuity equation consistent with severe aortic stenosis.   9. The mitral valve leaflets are tethered due to reduced systolic function and elevated LVDP.  10. Degenerative mitral valve.  11. Moderate mitral annular calcification.  12. Severe mitral regurgitation.  13. Moderate tricuspid regurgitation.  14. Estimated pulmonary artery systolic pressure is 49.7 mmHg assuming a right atrial pressure of 15 mmHg, which is consistent with mild pulmonary hypertension.    PHYSICIAN INTERPRETATION:  Left Ventricle: The left ventricular internal cavity size is normal. Left ventricular wall thickness is mildly increased. There is mild concentric left ventricular hypertrophy. Global LV systolic function was moderately decreased. Entire inferior wall appears hypokinetic.  Right Ventricle: The right ventricular size is severely enlarged. RV systolic function is severely reduced.  Left Atrium: Severely enlarged left atrium.  Right Atrium: Moderately enlarged right atrium.  Pericardium:There is no evidence of pericardial effusion.  Mitral Valve: The mitral valve is degenerative in appearance. The mitral valve leaflets are tethered which is due to reduced systolic function and elevated LVDP. There is moderate mitral annular calcification. Severe mitral regurgitation.  Tricuspid Valve: The tricuspid valve is normal in structure. Moderate tricuspid regurgitation is visualized. Estimated pulmonary artery systolic pressure is 49.7 mmHg assuming a right atrial pressure of 15 mmHg, which is consistent with mild pulmonary hypertension.    < end of copied text >    Last EKG: < from: 12 Lead ECG (21 @ 05:39) >  Diagnosis Line Atrial fibrillation  Left axis deviation  Left bundle branch block  Abnormal ECG    < end of copied text >    Chest Xray: < from: Xray Chest 1 View-PORTABLE IMMEDIATE (Xray Chest 1 View-PORTABLE IMMEDIATE .) (21 @ 17:58) >  Impression:    Cardiomegaly and bilateral opacities, stable.    < end of copied text >    ROS:  [X] A ten-point ROS was otherwise negative except as noted in HPI    Assessment:      Plan:  Neurology:      Respiratory:  -Keep HOB > 35; aspiration precautions     Cardiology:      Vascular:      GI/Nutrition:  -Diet, NPO with Tube Feed:   Tube Feeding Modality: Orogastric  Nepro with Carb Steady  Total Volume for 24 Hours (mL): 660  Bolus  Total Volume of Bolus (mL):  220  Total # of Feeds: 3  Tube Feed Frequency: Every 8 hours   Tube Feed Start Time: 13:00  Bolus Feed Rate (mL per Hour): 110   Bolus Feed Duration (in Hours): 2 (21 @ 12:01) [Active]  Diet, NPO with Tube Feed:   Tube Feeding Modality: Orogastric  Nepro with Carb Steady  Total Volume for 24 Hours (mL): 660  Bolus  Total Volume of Bolus (mL):  220  Tube Feed Frequency: Every 8 hours   Tube Feed Start Time: 14:00  Bolus Feed Rate (mL per Hour): 110   Bolus Feed Duration (in Hours): 2  Bolus Feed Instructions:  allow each feed to run for up to 2 hour long. flushes per nephro/CCU team  Beneprotein ([facility] Only)     Qty per Day:  6 (21 @ 10:03) [Pending Verification By Attending]        -Bowel Regimen ->  -PPx -> Pantoprazole       / Renal/ Fluids/ Electrolytes:      Hematology/ Oncology:  -DVT PPx -> Heparin SQ    Lovenox SQ    Heparin gtt     SCDs      Infectious Disease:      Musculoskeletol:      Endocrine:      Tubes/ Lines/ Drains:  Central    Peripheral    A-line    Castellon    NGT/ OGT    Chest    EVD    Disposition:  Continue medical management as per primary team in:   ICU   CCU   SICU   Stroke Unit   Stepdown    CODE STATUS w/ Next of Kin:   DNI   DNR   FULL    Patient seen and case discussed with NCC attending; see attending attestation  Please call w/ questions  Indu Vasquez NP  [phone]             Neurocritical Care Progress Note    GUS WILBURN    70y     [id number]     Daily Weight in k.3 (2021 06:00)  COVID-19 PCR: NotDetec (2021 12:50)  COVID-19 PCR: NotDetec (2021 06:41)  COVID-19 PCR: NotDetec (2021 16:59)  SARS-CoV-2: NotDetec (2021 06:10)  COVID-19 PCR: NotDetec (09 Mar 2021 11:17)  SARS-CoV-2: NotDetec (02 Mar 2021 15:40)    Patient is a 70y old  Female who presents with a chief complaint of cardiac arrest (2021 13:18)    HPI:  71 yo F with PMH of CAD s/p CABG (2019), HFrEF (EF 20%), AS, ESRD (MWF), bladder prolapse requiring chronic Montejo, Diabetes Type 2 on insulin presents from home s/p mechanical fall. When the history was taken in the emergency department, she The denied any dizziness/ palpitations/ aura/vertigo. She reported non-radiating R shoulder pain and non-radiating R hip pain on arrival. She was found to have R humeral fracture / R hip fracture / non displaced odontoid fracture/ acute rib fractures/ ? T6 vertebral fracture.     In the emergency department, while the patient was receiving trauma work up she was found pulseless by a PCA in the room ( she was not on the monitor at that time) and had a cardiac arrest. ROSC was achieved after 2 minutes of CPR (1 epinephrine was given and 1 calcium chloride iv). Patient was intubated and sedated. EKG showed RBBB with wide complex tachycardia. Per family at the bedside, the patient had her last hemodialysis yesterday and has been relatively non compliant with her medications and medical care at home. Unable to obtain ros as patient is sedated.     T(C): 35 , HR: 60, BP: 158/67, RR: 20, SpO2: 99% vented  Labs: d-dimer ~ 4700, p-bnp 70,000, trop (0.20 <--0.17)  ABG pH, Arterial: 7.45, CO2: 36, Arterial O2: 63 , HCO3: 25 , Base Excess: 1.4 , SaO2: 94      CTH (-) (2021 07:42)    Allergies:  No Known Allergies    PAST MEDICAL & SURGICAL HISTORY:  Diabetes    Congestive heart failure (CHF)    Pleural effusion due to congestive heart failure    End stage renal disease  on dialysis Mon, Wed, Fr    Uterine prolapse    Chronic indwelling Montejo catheter    History of repair of hip fracture      S/P CABG (coronary artery bypass graft)  2019    History of thoracentesis    Chronic indwelling Montejo catheter    Home Medications:  aspirin 81 mg oral tablet, chewable: 1 tab(s) orally once a day (2021 11:06)  atorvastatin 40 mg oral tablet: 1 tab(s) orally once a day (2021 11:06)  INSULIN LISPRO KWIKPEN  100 UNIT/ML SOPN:  (2021 11:06)  METOLAZONE  5 MG TABS: 1  orally once a day (2021 11:06)  METOPROLOL SUCCINATE ER  25 MG TB24: 1  orally 2 times a day (2021 11:06)  SERTRALINE HCL  50 MG TABS:  (2021 11:06)  SEVELAMER CARBONATE 800MG TABLETS: TK 2 TS PO TID WITH MEALS (2021 11:06)  TORSEMIDE 20MG TABLETS: TK 3 TS PO BID (2021 11:06)  VITAMIN D2 12974GT (ERGO) CAP RX: TK 1 C PO WEEKLY (2021 11:06)    24 Hour Events:  Patient remains intubated and sedated s/p cardiac arrest on . Not following commands, even while on a sedation vacation. No ON events.     Exam:  Mentation: Sedated on precedex 1.2 mcg/kg and fentanyl 0.7 mcg/kg. Minimal eye opening to noxious stimuli, not following commands. Language KAREN due to presence of ET tube.   Cranial Nerves:  	II – Inconsistently blinks to threat, so unsure if the blink is a reflex or a true blink  	III/IV/VI – 3 mm b/l reactive pupils. Left skew noted left eye  	V – Corneals present b/l  	VII – Difficult to assess facial palsy due to ET securement device  	VIII – No nystagmus. oculocephalics +  	IX/X – Cough to ET suction present (KAREN if palate rises symetrically or if uvula rises midline)  	XI – Deferred (KAREN SCM strength/symmetry)  	XII – Deferred (KAREN if tongue protrusion is at midline)  Motor: Flaccid, no tremors. No posturing witnessed. No spontaneous movement  Sensory: No trace withdrawal or localization, however, grimaces to painful stimuli   Cerebellum: KAREN dysmetria. Gait deferred    Current Medications:  aMIOdarone    Tablet 200 milliGRAM(s) Oral two times a day  aspirin  chewable 81 milliGRAM(s) Oral daily  atorvastatin Oral Tab/Cap - Peds 40 milliGRAM(s) Oral daily  BACItracin   Ointment 1 Application(s) Topical two times a day  chlorhexidine 0.12% Liquid 15 milliLiter(s) Oral Mucosa every 12 hours  chlorhexidine 4% Liquid 1 Application(s) Topical <User Schedule>  dexMEDEtomidine Infusion 0.2 MICROgram(s)/kG/Hr IV Continuous <Continuous>  doxercalciferol Injectable 2 MICROGram(s) IV Push <User Schedule>  epoetin mana-epbx (RETACRIT) Injectable 66733 Unit(s) IV Push <User Schedule>  fentaNYL   Infusion. 0.5 MICROgram(s)/kG/Hr IV Continuous <Continuous>  lactulose Syrup 15 Gram(s) Oral three times a day  levETIRAcetam  IVPB 375 milliGRAM(s) IV Intermittent every 12 hours  levETIRAcetam  IVPB 250 milliGRAM(s) IV Intermittent <User Schedule>  metoprolol tartrate Injectable 5 milliGRAM(s) IV Push once  pantoprazole  Injectable 40 milliGRAM(s) IV Push daily  phenylephrine    Infusion 0.1 MICROgram(s)/kG/Min IV Continuous <Continuous>  polyethylene glycol 3350 17 Gram(s) Oral at bedtime  valproic  acid Syrup 250 milliGRAM(s) Oral two times a day    Vital Signs Last 24 Hrs  T(C): 37.6 (2021 12:00), Max: 37.7 (2021 08:00)  T(F): 99.7 (2021 12:00), Max: 99.8 (2021 08:00)  HR: 136 (2021 14:54) (80 - 136)  BP: --  BP(mean): --  RR: 18 (2021 13:00) (12 - 18)  SpO2: 91% (2021 14:54) (91% - 100%)     I/Os:  I&O's Detail    2021 07:01  -  2021 07:00  --------------------------------------------------------  IN:    Dexmedetomidine: 213.9 mL    Dexmedetomidine: 88 mL    Enteral Tube Flush: 210 mL    FentaNYL: 52.5 mL    FentaNYL: 73 mL    Glucerna: 900 mL    Heparin: 13 mL    IV PiggyBack: 400 mL    Phenylephrine: 37.5 mL    Phenylephrine: 10 mL  Total IN: 1997.9 mL    OUT:    Indwelling Catheter - Urethral (mL): 58 mL  Total OUT: 58 mL    Total NET: 1939.9 mL      2021 07:01  -  2021 15:04  --------------------------------------------------------  IN:    Dexmedetomidine: 87 mL    FentaNYL: 16.4 mL    IV PiggyBack: 50 mL    Phenylephrine: 15 mL  Total IN: 168.4 mL    OUT:    Indwelling Catheter - Urethral (mL): 35 mL    Other (mL): 1000 mL  Total OUT: 1035 mL    Total NET: -866.6 mL    Labs:  ABG - ( 2021 03:26 )  pH, Arterial: 7.37  pH, Blood: x     /  pCO2: 44    /  pO2: 193   / HCO3: 25    / Base Excess: x     /  SaO2: 100       Mode: AC/ CMV (Assist Control/ Continuous Mandatory Ventilation)  RR (machine): 12  TV (machine): 350  FiO2: 30  PEEP: 5  ITime: 1  MAP: 9  PIP: 19    LIVER FUNCTIONS - ( 2021 04:40 )  Alb: 2.7 g/dL / Pro: 5.4 g/dL / ALK PHOS: 188 U/L / ALT: <5 U/L / AST: 16 U/L / GGT: x                                 8.1    8.59  )-----------( 192      ( 2021 04:40 )             26.5      04-13    138  |  100  |  46<H>  ----------------------------<  182<H>  4.6   |  22  |  4.2<HH>    Ca    8.4<L>      2021 04:40  Mg     2.1         TPro  5.4<L>  /  Alb  2.7<L>  /  TBili  0.2  /  DBili  x   /  AST  16  /  ALT  <5  /  AlkPhos  188<H>       PT/INR - ( 2021 04:40 )   PT: 16.80 sec;   INR: 1.46 ratio      PTT - ( 2021 04:40 )  PTT:29.3 sec    Diagnostics/ Results:  Last CTH: < from: CT Head No Cont (21 @ 21:57) >  IMPRESSION:    No CT evidence of acute intracranial pathology.    < end of copied text >    Last CTA/MRA: n/a    Last CTP: n/a    Last MRI: n/a    Last TCD: n/a    Last EEG: VEEG in the last 24 hours:    Background----------  continues, less than optimally organized, reactive and reaching frequencies in the range of 6-7 hz    Focal and generalized slowing-------------   moderate generalized slowing                                                                 bifrontal slowing  Interictal activity---------  large amount of diffusely expressed triphasic waves that some are clealy epileptiform in nature and are stimulus dependent. This activity occasionally appears in psuedo-periodic pattern    Events---------------  none    Seizures------------  none    Impression:  1- encephalopathy  2- potential for seizure that is not lateralizing    Last Echo: < from: TTE Echo Complete w/o Contrast w/ Doppler (21 @ 10:24) >  Summary:   1. LV Ejection Fraction by Renner's Method with a biplane EF of 38 %.   2. Moderately decreased global left ventricular systolic function.   3. Entire inferior wall appears hypokinetic.   4. Mild concentric left ventricular hypertrophy.   5. Severely reduced RV systolic function.   6. Severely enlarged right ventricle.   7. Severely enlarged left atrium.   8. Peak transaortic gradient equals 48.8 mmHg, mean transaortic gradient equals 24.7 mmHg, the calculated aortic valve area equals 0.75 cm² by the continuity equation consistent with severe aortic stenosis.   9. The mitral valve leaflets are tethered due to reduced systolic function and elevated LVDP.  10. Degenerative mitral valve.  11. Moderate mitral annular calcification.  12. Severe mitral regurgitation.  13. Moderate tricuspid regurgitation.  14. Estimated pulmonary artery systolic pressure is 49.7 mmHg assuming a right atrial pressure of 15 mmHg, which is consistent with mild pulmonary hypertension.    PHYSICIAN INTERPRETATION:  Left Ventricle: The left ventricular internal cavity size is normal. Left ventricular wall thickness is mildly increased. There is mild concentric left ventricular hypertrophy. Global LV systolic function was moderately decreased. Entire inferior wall appears hypokinetic.  Right Ventricle: The right ventricular size is severely enlarged. RV systolic function is severely reduced.  Left Atrium: Severely enlarged left atrium.  Right Atrium: Moderately enlarged right atrium.  Pericardium:There is no evidence of pericardial effusion.  Mitral Valve: The mitral valve is degenerative in appearance. The mitral valve leaflets are tethered which is due to reduced systolic function and elevated LVDP. There is moderate mitral annular calcification. Severe mitral regurgitation.  Tricuspid Valve: The tricuspid valve is normal in structure. Moderate tricuspid regurgitation is visualized. Estimated pulmonary artery systolic pressure is 49.7 mmHg assuming a right atrial pressure of 15 mmHg, which is consistent with mild pulmonary hypertension.    < end of copied text >    Last EKG: < from: 12 Lead ECG (21 @ 05:39) >  Diagnosis Line Atrial fibrillation  Left axis deviation  Left bundle branch block  Abnormal ECG    < end of copied text >    Chest Xray: < from: Xray Chest 1 View-PORTABLE IMMEDIATE (Xray Chest 1 View-PORTABLE IMMEDIATE .) (21 @ 17:58) >  Impression:    Cardiomegaly and bilateral opacities, stable.    < end of copied text >    ROS:  [X] A ten-point ROS was otherwise negative except as noted in HPI    Assessment:  69 y/o F with PMH A-fib, CAD, S/P CABG (2019), HFrEF (EF 20%), AS, ESRD w/ HD M/W/F, bladder prolapse requiring chronic montejo, T2DM (on Insulin), BIBA 2/2 mechanical fall. Multiple body fractures on arrival to the ED. While in ED, s/p cardiac arrest of unknown downtime d/t unmonitored, however, ROSC achieved 2 minutes after initiation of CPR. Intubated for airway protection. Post-cardiac arrest, noted seizure-like episode, with right gaze preference and clonic jerking of extremities while decreasing sedation for CPAP trials. Symptoms lasting approximately 5 minutes, that resolved with Versed 2mg IV. Another similar episode witnessed by family, lasting approximately one minutes, that resolved with no intervention. Initial CTH, 2021, negative for acute intracranial pathology, with subsequent imaging post seizure revealing no change from previous CTH. vEEG, 4/3/2021, consistent for metabolic process, with bifrontal sharps but no seizure activity. vEEG, 2021, revealed no seizure activity. S/p AICD yesterday. Remains intubated and sedated, not following commands, even on brief sedation vacation. Elevated BUN/Cr, so likely metabolic. Continue to perform daily sedation holidays to confirm intact neuro status. Will follow, though overall, appears to have good neurologic prognosis if she can survive ongoing medical illness.    Plan:  Neurology:  - Neuro checks q2hrs  - Seizure precautions  - Continue Keppra 375mg IV q12hrs and 250mg post-HMD  - Continue Valproic acid 250mg PO q12hrs  - Continue ASA 81mg PO q24hrs  - Continue Atorvastatin 40mg PO q24hrs  - Minimize sedation for neurological examination  - Utilize Fentanyl IVP prn for vent synchrony/agitation  - Stat CTH if any worsening or deterioration in neurological examination and call NCC/Neurology  - Continue to perform daily sedation holidays to confirm intact neuro status      Respiratory:  -Keep HOB > 35; aspiration precautions   - Ventilator management as per primary team  - SAT/SBT as tolerated  - VAP protocol  - Avoid sedation; utilize Fentanyl IVP prn for vent synchrony/agitation  - Keep plateau pressure < 30 to maintain venous drainage  - Keep SaO2 > 95%      Cardiology:  - Keep normotensive  - Continue Amiodarone 200mg PO q12hrs    Vascular:  -VA duplex UE dopplers; left LE brachial vein DVT    GI/Nutrition:  -Diet, NPO with Tube Feed: Tube Feeding Modality: Orogastric Nepro with Carb Steady  -Bowel Regimen ->  -PPx -> Pantoprazole       / Renal/ Fluids/ Electrolytes:  - Strict I/Os  - Daily weights  - Keep euvolemic  - Keep normoglycemic  - Keep normonatremic   - Keep Magnesium level > 2  - Monitor lytes, replete as needed      Hematology/ Oncology:  -DVT PPx -> Heparin SQ    Lovenox SQ    Heparin gtt     SCDs      Infectious Disease:      Musculoskeletol:      Endocrine:      Tubes/ Lines/ Drains:  Central    Peripheral    A-line    Montejo    NGT/ OGT    Chest    EVD    Disposition:  Continue medical management as per primary team in:   ICU   CCU   SICU   Stroke Unit   Stepdown    CODE STATUS w/ Next of Kin:   DNI   DNR   FULL    Patient seen and case discussed with NCC attending; see attending attestation  Please call w/ questions  Indu Vasquez NP  [id number]             Neurocritical Care Progress Note    GUS WILBURN    70y     [id number]     Daily Weight in k.3 (2021 06:00)  COVID-19 PCR: NotDetec (2021 12:50)  COVID-19 PCR: NotDetec (2021 06:41)  COVID-19 PCR: NotDetec (2021 16:59)  SARS-CoV-2: NotDetec (2021 06:10)  COVID-19 PCR: NotDetec (09 Mar 2021 11:17)  SARS-CoV-2: NotDetec (02 Mar 2021 15:40)    Patient is a 70y old  Female who presents with a chief complaint of cardiac arrest (2021 13:18)    HPI:  71 yo F with PMH of CAD s/p CABG (2019), HFrEF (EF 20%), AS, ESRD (MWF), bladder prolapse requiring chronic Montejo, Diabetes Type 2 on insulin presents from home s/p mechanical fall. When the history was taken in the emergency department, she The denied any dizziness/ palpitations/ aura/vertigo. She reported non-radiating R shoulder pain and non-radiating R hip pain on arrival. She was found to have R humeral fracture / R hip fracture / non displaced odontoid fracture/ acute rib fractures/ ? T6 vertebral fracture.     In the emergency department, while the patient was receiving trauma work up she was found pulseless by a PCA in the room ( she was not on the monitor at that time) and had a cardiac arrest. ROSC was achieved after 2 minutes of CPR (1 epinephrine was given and 1 calcium chloride iv). Patient was intubated and sedated. EKG showed RBBB with wide complex tachycardia. Per family at the bedside, the patient had her last hemodialysis yesterday and has been relatively non compliant with her medications and medical care at home. Unable to obtain ros as patient is sedated.     T(C): 35 , HR: 60, BP: 158/67, RR: 20, SpO2: 99% vented  Labs: d-dimer ~ 4700, p-bnp 70,000, trop (0.20 <--0.17)  ABG pH, Arterial: 7.45, CO2: 36, Arterial O2: 63 , HCO3: 25 , Base Excess: 1.4 , SaO2: 94      CTH (-) (2021 07:42)    Allergies:  No Known Allergies    PAST MEDICAL & SURGICAL HISTORY:  Diabetes    Congestive heart failure (CHF)    Pleural effusion due to congestive heart failure    End stage renal disease  on dialysis Mon, Wed, Fr    Uterine prolapse    Chronic indwelling Montejo catheter    History of repair of hip fracture      S/P CABG (coronary artery bypass graft)  2019    History of thoracentesis    Chronic indwelling Montejo catheter    Home Medications:  aspirin 81 mg oral tablet, chewable: 1 tab(s) orally once a day (2021 11:06)  atorvastatin 40 mg oral tablet: 1 tab(s) orally once a day (2021 11:06)  INSULIN LISPRO KWIKPEN  100 UNIT/ML SOPN:  (2021 11:06)  METOLAZONE  5 MG TABS: 1  orally once a day (2021 11:06)  METOPROLOL SUCCINATE ER  25 MG TB24: 1  orally 2 times a day (2021 11:06)  SERTRALINE HCL  50 MG TABS:  (2021 11:06)  SEVELAMER CARBONATE 800MG TABLETS: TK 2 TS PO TID WITH MEALS (2021 11:06)  TORSEMIDE 20MG TABLETS: TK 3 TS PO BID (2021 11:06)  VITAMIN D2 90052GJ (ERGO) CAP RX: TK 1 C PO WEEKLY (2021 11:06)    24 Hour Events:  Patient remains intubated and sedated s/p cardiac arrest on . Not following commands, even while on a sedation vacation. No ON events.     Exam:  Mentation: Sedated on precedex 1.2 mcg/kg and fentanyl 0.7 mcg/kg. Minimal eye opening to noxious stimuli, not following commands. Language KAREN due to presence of ET tube.   Cranial Nerves:  	II – Inconsistently blinks to threat, so unsure if the blink is a reflex or a true blink  	III/IV/VI – 3 mm b/l reactive pupils. Left skew noted left eye  	V – Corneals present b/l  	VII – Difficult to assess facial palsy due to ET securement device  	VIII – No nystagmus. oculocephalics +  	IX/X – Cough to ET suction present (KAREN if palate rises symetrically or if uvula rises midline)  	XI – Deferred (KAREN SCM strength/symmetry)  	XII – Deferred (KAREN if tongue protrusion is at midline)  Motor: Flaccid, no tremors. No posturing witnessed. No spontaneous movement  Sensory: No trace withdrawal or localization, however, grimaces to painful stimuli   Cerebellum: KAREN dysmetria. Gait deferred    Current Medications:  aMIOdarone    Tablet 200 milliGRAM(s) Oral two times a day  aspirin  chewable 81 milliGRAM(s) Oral daily  atorvastatin Oral Tab/Cap - Peds 40 milliGRAM(s) Oral daily  BACItracin   Ointment 1 Application(s) Topical two times a day  chlorhexidine 0.12% Liquid 15 milliLiter(s) Oral Mucosa every 12 hours  chlorhexidine 4% Liquid 1 Application(s) Topical <User Schedule>  dexMEDEtomidine Infusion 0.2 MICROgram(s)/kG/Hr IV Continuous <Continuous>  doxercalciferol Injectable 2 MICROGram(s) IV Push <User Schedule>  epoetin mana-epbx (RETACRIT) Injectable 10040 Unit(s) IV Push <User Schedule>  fentaNYL   Infusion. 0.5 MICROgram(s)/kG/Hr IV Continuous <Continuous>  lactulose Syrup 15 Gram(s) Oral three times a day  levETIRAcetam  IVPB 375 milliGRAM(s) IV Intermittent every 12 hours  levETIRAcetam  IVPB 250 milliGRAM(s) IV Intermittent <User Schedule>  metoprolol tartrate Injectable 5 milliGRAM(s) IV Push once  pantoprazole  Injectable 40 milliGRAM(s) IV Push daily  phenylephrine    Infusion 0.1 MICROgram(s)/kG/Min IV Continuous <Continuous>  polyethylene glycol 3350 17 Gram(s) Oral at bedtime  valproic  acid Syrup 250 milliGRAM(s) Oral two times a day    Vital Signs Last 24 Hrs  T(C): 37.6 (2021 12:00), Max: 37.7 (2021 08:00)  T(F): 99.7 (2021 12:00), Max: 99.8 (2021 08:00)  HR: 136 (2021 14:54) (80 - 136)  BP: --  BP(mean): --  RR: 18 (2021 13:00) (12 - 18)  SpO2: 91% (2021 14:54) (91% - 100%)     I/Os:  I&O's Detail    2021 07:01  -  2021 07:00  --------------------------------------------------------  IN:    Dexmedetomidine: 213.9 mL    Dexmedetomidine: 88 mL    Enteral Tube Flush: 210 mL    FentaNYL: 52.5 mL    FentaNYL: 73 mL    Glucerna: 900 mL    Heparin: 13 mL    IV PiggyBack: 400 mL    Phenylephrine: 37.5 mL    Phenylephrine: 10 mL  Total IN: 1997.9 mL    OUT:    Indwelling Catheter - Urethral (mL): 58 mL  Total OUT: 58 mL    Total NET: 1939.9 mL      2021 07:01  -  2021 15:04  --------------------------------------------------------  IN:    Dexmedetomidine: 87 mL    FentaNYL: 16.4 mL    IV PiggyBack: 50 mL    Phenylephrine: 15 mL  Total IN: 168.4 mL    OUT:    Indwelling Catheter - Urethral (mL): 35 mL    Other (mL): 1000 mL  Total OUT: 1035 mL    Total NET: -866.6 mL    Labs:  ABG - ( 2021 03:26 )  pH, Arterial: 7.37  pH, Blood: x     /  pCO2: 44    /  pO2: 193   / HCO3: 25    / Base Excess: x     /  SaO2: 100       Mode: AC/ CMV (Assist Control/ Continuous Mandatory Ventilation)  RR (machine): 12  TV (machine): 350  FiO2: 30  PEEP: 5  ITime: 1  MAP: 9  PIP: 19    LIVER FUNCTIONS - ( 2021 04:40 )  Alb: 2.7 g/dL / Pro: 5.4 g/dL / ALK PHOS: 188 U/L / ALT: <5 U/L / AST: 16 U/L / GGT: x                                 8.1    8.59  )-----------( 192      ( 2021 04:40 )             26.5      04-13    138  |  100  |  46<H>  ----------------------------<  182<H>  4.6   |  22  |  4.2<HH>    Ca    8.4<L>      2021 04:40  Mg     2.1         TPro  5.4<L>  /  Alb  2.7<L>  /  TBili  0.2  /  DBili  x   /  AST  16  /  ALT  <5  /  AlkPhos  188<H>       PT/INR - ( 2021 04:40 )   PT: 16.80 sec;   INR: 1.46 ratio      PTT - ( 2021 04:40 )  PTT:29.3 sec    Diagnostics/ Results:  Last CTH: < from: CT Head No Cont (21 @ 21:57) >  IMPRESSION:    No CT evidence of acute intracranial pathology.    < end of copied text >    Last CTA/MRA: n/a    Last CTP: n/a    Last MRI: n/a    Last TCD: n/a    Last EEG: VEEG in the last 24 hours:    Background----------  continues, less than optimally organized, reactive and reaching frequencies in the range of 6-7 hz    Focal and generalized slowing-------------   moderate generalized slowing                                                                 bifrontal slowing  Interictal activity---------  large amount of diffusely expressed triphasic waves that some are clealy epileptiform in nature and are stimulus dependent. This activity occasionally appears in psuedo-periodic pattern    Events---------------  none    Seizures------------  none    Impression:  1- encephalopathy  2- potential for seizure that is not lateralizing    Last Echo: < from: TTE Echo Complete w/o Contrast w/ Doppler (21 @ 10:24) >  Summary:   1. LV Ejection Fraction by Renner's Method with a biplane EF of 38 %.   2. Moderately decreased global left ventricular systolic function.   3. Entire inferior wall appears hypokinetic.   4. Mild concentric left ventricular hypertrophy.   5. Severely reduced RV systolic function.   6. Severely enlarged right ventricle.   7. Severely enlarged left atrium.   8. Peak transaortic gradient equals 48.8 mmHg, mean transaortic gradient equals 24.7 mmHg, the calculated aortic valve area equals 0.75 cm² by the continuity equation consistent with severe aortic stenosis.   9. The mitral valve leaflets are tethered due to reduced systolic function and elevated LVDP.  10. Degenerative mitral valve.  11. Moderate mitral annular calcification.  12. Severe mitral regurgitation.  13. Moderate tricuspid regurgitation.  14. Estimated pulmonary artery systolic pressure is 49.7 mmHg assuming a right atrial pressure of 15 mmHg, which is consistent with mild pulmonary hypertension.    PHYSICIAN INTERPRETATION:  Left Ventricle: The left ventricular internal cavity size is normal. Left ventricular wall thickness is mildly increased. There is mild concentric left ventricular hypertrophy. Global LV systolic function was moderately decreased. Entire inferior wall appears hypokinetic.  Right Ventricle: The right ventricular size is severely enlarged. RV systolic function is severely reduced.  Left Atrium: Severely enlarged left atrium.  Right Atrium: Moderately enlarged right atrium.  Pericardium:There is no evidence of pericardial effusion.  Mitral Valve: The mitral valve is degenerative in appearance. The mitral valve leaflets are tethered which is due to reduced systolic function and elevated LVDP. There is moderate mitral annular calcification. Severe mitral regurgitation.  Tricuspid Valve: The tricuspid valve is normal in structure. Moderate tricuspid regurgitation is visualized. Estimated pulmonary artery systolic pressure is 49.7 mmHg assuming a right atrial pressure of 15 mmHg, which is consistent with mild pulmonary hypertension.    < end of copied text >    Last EKG: < from: 12 Lead ECG (21 @ 05:39) >  Diagnosis Line Atrial fibrillation  Left axis deviation  Left bundle branch block  Abnormal ECG    < end of copied text >    Chest Xray: < from: Xray Chest 1 View-PORTABLE IMMEDIATE (Xray Chest 1 View-PORTABLE IMMEDIATE .) (21 @ 17:58) >  Impression:    Cardiomegaly and bilateral opacities, stable.    < end of copied text >    ROS:  [X] A ten-point ROS was otherwise negative except as noted in HPI    Assessment:  71 y/o F with PMH A-fib, CAD, S/P CABG (2019), HFrEF (EF 20%), AS, ESRD w/ HD M/W/F, bladder prolapse requiring chronic montejo, T2DM (on Insulin), BIBA 2/2 mechanical fall. Multiple body fractures on arrival to the ED. While in ED, s/p cardiac arrest of unknown downtime d/t unmonitored, however, ROSC achieved 2 minutes after initiation of CPR. Intubated for airway protection. Post-cardiac arrest, noted seizure-like episode, with right gaze preference and clonic jerking of extremities while decreasing sedation for CPAP trials. Symptoms lasting approximately 5 minutes, that resolved with Versed 2mg IV. Another similar episode witnessed by family, lasting approximately one minutes, that resolved with no intervention. Initial CTH, 2021, negative for acute intracranial pathology, with subsequent imaging post seizure revealing no change from previous CTH. vEEG, 4/3/2021, consistent for metabolic process, with bifrontal sharps but no seizure activity. vEEG, 2021, revealed no seizure activity. S/p AICD yesterday. Remains intubated and sedated, not following commands, even on brief sedation vacation. Elevated BUN/Cr, so likely metabolic. Continue to perform daily sedation holidays to confirm intact neuro status. Will follow, though overall, appears to have good neurologic prognosis if she can survive ongoing medical illness.    Plan:  Neurology:  - Neuro checks q2hrs  - Seizure precautions  - Continue Keppra 375mg IV q12hrs and 250mg post-HMD  - Continue Valproic acid 250mg PO q12hrs  - Continue ASA 81mg PO q24hrs  - Continue Atorvastatin 40mg PO q24hrs  - Minimize sedation for neurological examination  - Utilize Fentanyl IVP prn for vent synchrony/agitation  - Stat CTH if any worsening or deterioration in neurological examination and call NCC/Neurology  - Continue to perform daily sedation holidays to confirm intact neuro status    Respiratory:  -Keep HOB > 35; aspiration precautions   - Ventilator management as per primary team  - SAT/SBT as tolerated  - VAP protocol  - Avoid sedation; utilize Fentanyl IVP prn for vent synchrony/agitation  - Keep plateau pressure < 30 to maintain venous drainage  - Keep SaO2 > 95%    Cardiology:  - Keep normotensive  - Continue Amiodarone 200mg PO q12hrs    Vascular:  -VA duplex UE dopplers; left LE brachial vein DVT    GI/Nutrition:  -Diet, NPO with Tube Feed: Tube Feeding Modality: Orogastric Nepro with Carb Steady  -Bowel Regimen -> lactulose/ Miralax  -PPx -> Pantoprazole     / Renal/ Fluids/ Electrolytes:  - Strict I/Os  - Daily weights  - Keep euvolemic  - Keep normoglycemic  - Keep normonatremic   - Keep Magnesium level > 2  - Monitor lytes, replete as needed    Hematology/ Oncology:  -DVT PPx -> Heparin SQ    Lovenox SQ    Heparin gtt     SCDs    Infectious Disease:  -Monitor and treat fevers; tylenol PRN     Musculoskeletol:  -BEDrest    Endocrine:  -Lipitor    Tubes/ Lines/ Drains:  Central    Peripheral    A-line    [place]    NGT/ OGT    Chest    EVD    Disposition:  Continue medical management as per primary team in:   ICU   CCU   SICU   Stroke Unit   Stepdown    CODE STATUS w/ Next of Kin:   DNI   DNR   FULL    Patient seen and case discussed with NCC attending; see attending attestation  Please call w/ questions  Indu Vasquez NP  [phone]

## 2021-04-13 NOTE — PROGRESS NOTE ADULT - SUBJECTIVE AND OBJECTIVE BOX
INTERVAL HPI/OVERNIGHT EVENTS: No acute events, patient in AF rate controlled. SP BiV AICD POD#1. Still intubated and sedated    MEDICATIONS  (STANDING):  aMIOdarone    Tablet 200 milliGRAM(s) Oral two times a day  aspirin  chewable 81 milliGRAM(s) Oral daily  atorvastatin Oral Tab/Cap - Peds 40 milliGRAM(s) Oral daily  BACItracin   Ointment 1 Application(s) Topical two times a day  chlorhexidine 0.12% Liquid 15 milliLiter(s) Oral Mucosa every 12 hours  chlorhexidine 4% Liquid 1 Application(s) Topical <User Schedule>  dexMEDEtomidine Infusion 0.2 MICROgram(s)/kG/Hr (3.3 mL/Hr) IV Continuous <Continuous>  doxercalciferol Injectable 2 MICROGram(s) IV Push <User Schedule>  epoetin mana-epbx (RETACRIT) Injectable 04186 Unit(s) IV Push <User Schedule>  fentaNYL   Infusion. 0.5 MICROgram(s)/kG/Hr (2.9 mL/Hr) IV Continuous <Continuous>  lactulose Syrup 15 Gram(s) Oral three times a day  levETIRAcetam  IVPB 375 milliGRAM(s) IV Intermittent every 12 hours  levETIRAcetam  IVPB 250 milliGRAM(s) IV Intermittent <User Schedule>  pantoprazole  Injectable 40 milliGRAM(s) IV Push daily  phenylephrine    Infusion 0.1 MICROgram(s)/kG/Min (1.09 mL/Hr) IV Continuous <Continuous>  polyethylene glycol 3350 17 Gram(s) Oral at bedtime  valproic  acid Syrup 250 milliGRAM(s) Oral two times a day    MEDICATIONS  (PRN):      Allergies    No Known Allergies    Intolerances        REVIEW OF SYSTEMS      General:	    Skin/Breast:  	  Ophthalmologic:  	  ENMT:	    Respiratory and Thorax:  	  Cardiovascular:	    Gastrointestinal:	    Genitourinary:	    Musculoskeletal:	    Neurological:	    Psychiatric:	    Hematology/Lymphatics:	    Endocrine:	    Allergic/Immunologic:	    Vital Signs Last 24 Hrs  T(C): 37.7 (13 Apr 2021 08:00), Max: 37.7 (13 Apr 2021 08:00)  T(F): 99.8 (13 Apr 2021 08:00), Max: 99.8 (13 Apr 2021 08:00)  HR: 88 (13 Apr 2021 09:00) (80 - 104)  BP: --  BP(mean): --  RR: 18 (13 Apr 2021 09:00) (12 - 18)  SpO2: 100% (13 Apr 2021 09:00) (98% - 100%)    04-12-21 @ 07:01  -  04-13-21 @ 07:00  --------------------------------------------------------  IN: 1997.9 mL / OUT: 58 mL / NET: 1939.9 mL    04-13-21 @ 07:01  -  04-13-21 @ 10:31  --------------------------------------------------------  IN: 44.9 mL / OUT: 25 mL / NET: 19.9 mL        Physical Exam    GENERAL: In no apparent distress, well nourished, and hydrated.  HEAD:  Atraumatic, Normocephalic  EYES: EOMI, PERRLA, conjunctiva and sclera clear  ENMT: No tonsillar erythema, exudates, or enlargements; ist mucous membranes, Good dentition, No lesions  NECK: Supple and normal thyroid.  No JVD or carotid bruit.  Carotid pulse is 2+ bilaterally.  HEART: Regular rate and rhythm; No murmurs, rubs, or gallops.  PULMONARY: Clear to auscultation and perfusion.  No rales, wheezing, or rhonchi bilaterally.  ABDOMEN: Soft, Nontender, Nondistended; Bowel sounds present  EXTREMITIES:  2+ Peripheral Pulses, No clubbing, cyanosis, or edema  LYMPH: No lymphadenopathy noted  NEUROLOGICAL: Grossly nonfocal    LABS:                        8.1    8.59  )-----------( 192      ( 13 Apr 2021 04:40 )             26.5     04-13    138  |  100  |  46<H>  ----------------------------<  182<H>  4.6   |  22  |  4.2<HH>    Ca    8.4<L>      13 Apr 2021 04:40  Mg     2.1     04-13    TPro  5.4<L>  /  Alb  2.7<L>  /  TBili  0.2  /  DBili  x   /  AST  16  /  ALT  <5  /  AlkPhos  188<H>  04-13    PT/INR - ( 13 Apr 2021 04:40 )   PT: 16.80 sec;   INR: 1.46 ratio         PTT - ( 13 Apr 2021 04:40 )  PTT:29.3 sec      RADIOLOGY & ADDITIONAL TESTS:   INTERVAL HPI/OVERNIGHT EVENTS: No acute events, patient in AF rate controlled. SP BiV AICD POD#1. Still intubated and sedated    MEDICATIONS  (STANDING):  aMIOdarone    Tablet 200 milliGRAM(s) Oral two times a day  aspirin  chewable 81 milliGRAM(s) Oral daily  atorvastatin Oral Tab/Cap - Peds 40 milliGRAM(s) Oral daily  BACItracin   Ointment 1 Application(s) Topical two times a day  chlorhexidine 0.12% Liquid 15 milliLiter(s) Oral Mucosa every 12 hours  chlorhexidine 4% Liquid 1 Application(s) Topical <User Schedule>  dexMEDEtomidine Infusion 0.2 MICROgram(s)/kG/Hr (3.3 mL/Hr) IV Continuous <Continuous>  doxercalciferol Injectable 2 MICROGram(s) IV Push <User Schedule>  epoetin mana-epbx (RETACRIT) Injectable 18482 Unit(s) IV Push <User Schedule>  fentaNYL   Infusion. 0.5 MICROgram(s)/kG/Hr (2.9 mL/Hr) IV Continuous <Continuous>  lactulose Syrup 15 Gram(s) Oral three times a day  levETIRAcetam  IVPB 375 milliGRAM(s) IV Intermittent every 12 hours  levETIRAcetam  IVPB 250 milliGRAM(s) IV Intermittent <User Schedule>  pantoprazole  Injectable 40 milliGRAM(s) IV Push daily  phenylephrine    Infusion 0.1 MICROgram(s)/kG/Min (1.09 mL/Hr) IV Continuous <Continuous>  polyethylene glycol 3350 17 Gram(s) Oral at bedtime  valproic  acid Syrup 250 milliGRAM(s) Oral two times a day    MEDICATIONS  (PRN):      Allergies  No Known Allergies  Intolerances        REVIEW OF SYSTEMS: Unable to assess 2/2 mental status    Vital Signs Last 24 Hrs  T(C): 37.7 (13 Apr 2021 08:00), Max: 37.7 (13 Apr 2021 08:00)  T(F): 99.8 (13 Apr 2021 08:00), Max: 99.8 (13 Apr 2021 08:00)  HR: 88 (13 Apr 2021 09:00) (80 - 104)  BP: --  BP(mean): --  RR: 18 (13 Apr 2021 09:00) (12 - 18)  SpO2: 100% (13 Apr 2021 09:00) (98% - 100%)    04-12-21 @ 07:01  -  04-13-21 @ 07:00  --------------------------------------------------------  IN: 1997.9 mL / OUT: 58 mL / NET: 1939.9 mL    04-13-21 @ 07:01  -  04-13-21 @ 10:31  --------------------------------------------------------  IN: 44.9 mL / OUT: 25 mL / NET: 19.9 mL        Physical Exam  GENERAL: Intubated  NECK: C-collar in place  HEART: Irregular rate and rhythm; No murmurs, rubs, or gallops.  PULMONARY: Clear to auscultation and perfusion.  No rales, wheezing, or rhonchi bilaterally.  ABDOMEN: Soft, Nontender, Nondistended; Bowel sounds present  EXTREMITIES:  2+ Peripheral Pulses, No clubbing, cyanosis, or edema  SKIN: Dressing over L chest wall, no hematoma  NEUROLOGICAL: Grossly nonfocal    LABS:                        8.1    8.59  )-----------( 192      ( 13 Apr 2021 04:40 )             26.5     04-13    138  |  100  |  46<H>  ----------------------------<  182<H>  4.6   |  22  |  4.2<HH>    Ca    8.4<L>      13 Apr 2021 04:40  Mg     2.1     04-13    TPro  5.4<L>  /  Alb  2.7<L>  /  TBili  0.2  /  DBili  x   /  AST  16  /  ALT  <5  /  AlkPhos  188<H>  04-13    PT/INR - ( 13 Apr 2021 04:40 )   PT: 16.80 sec;   INR: 1.46 ratio         PTT - ( 13 Apr 2021 04:40 )  PTT:29.3 sec      RADIOLOGY & ADDITIONAL TESTS:    < from: Xray Chest 1 View-PORTABLE IMMEDIATE (Xray Chest 1 View-PORTABLE IMMEDIATE .) (04.12.21 @ 17:58) >  EXAM:  XR CHEST PORTABLE IMMED 1V            PROCEDURE DATE:  04/12/2021            INTERPRETATION:  Clinical History / Reason for exam: Tube placement    Comparison : Chest radiograph April 12, 2021 time 4:23 PM.    Technique/Positioning: Frontal.    Findings:    Support devices: Satisfactory positioning.    Cardiac/mediastinum/hilum: Cardiomegaly, thoracic calcification, status post median sternotomy, left atrial appendage clip.    Lung parenchyma/Pleura: Bilateral opacities.    Skeleton/softtissues: Stable. Right humeral neck fracture..    Impression:    Cardiomegaly and bilateral opacities, stable.                  JAY FRANCO MD; Attending Radiologist  This document has been electronically signed. Apr 13 2021 10:42AM    < end of copied text >

## 2021-04-13 NOTE — PROGRESS NOTE ADULT - ATTENDING COMMENTS
Patient remains intubated/sedated. S/p CRT-D. Patient is in afib with rate ~110-120.       Give one dose of digoxin 0.125 iv   Start metoprolol 12.5 mg tid   Restart Levophed if becomes hypotensive   Fluid removal via HD - Nephro following   Plan for hip surgery tomorrow - Patient is at acceptable risk for procedure   Plan for extubation after surgery - management per PCCM

## 2021-04-13 NOTE — PROGRESS NOTE ADULT - SUBJECTIVE AND OBJECTIVE BOX
[place] [facility] FOLLOW UP NOTE  --------------------------------------------------------------------------------  24 hour events/subjective: Patient examined. Intubated.    PAST HISTORY  --------------------------------------------------------------------------------  No significant changes to PMH, PSH, FHx, SHx, unless otherwise noted    ALLERGIES & MEDICATIONS  --------------------------------------------------------------------------------  Allergies    No Known Allergies    Standing Inpatient Medications  aMIOdarone    Tablet 200 milliGRAM(s) Oral two times a day  aspirin  chewable 81 milliGRAM(s) Oral daily  atorvastatin Oral Tab/Cap - Peds 40 milliGRAM(s) Oral daily  BACItracin   Ointment 1 Application(s) Topical two times a day  chlorhexidine 0.12% Liquid 15 milliLiter(s) Oral Mucosa every 12 hours  chlorhexidine 4% Liquid 1 Application(s) Topical <User Schedule>  dexMEDEtomidine Infusion 0.2 MICROgram(s)/kG/Hr IV Continuous <Continuous>  doxercalciferol Injectable 2 MICROGram(s) IV Push <User Schedule>  epoetin mana-epbx (RETACRIT) Injectable 11348 Unit(s) IV Push <User Schedule>  fentaNYL   Infusion. 0.5 MICROgram(s)/kG/Hr IV Continuous <Continuous>  lactulose Syrup 15 Gram(s) Oral three times a day  levETIRAcetam  IVPB 375 milliGRAM(s) IV Intermittent every 12 hours  levETIRAcetam  IVPB 250 milliGRAM(s) IV Intermittent <User Schedule>  pantoprazole  Injectable 40 milliGRAM(s) IV Push daily  phenylephrine    Infusion 0.1 MICROgram(s)/kG/Min IV Continuous <Continuous>  polyethylene glycol 3350 17 Gram(s) Oral at bedtime  valproic  acid Syrup 250 milliGRAM(s) Oral two times a day    VITALS/PHYSICAL EXAM  --------------------------------------------------------------------------------  T(C): 37.7 (04-13-21 @ 08:00), Max: 37.7 (04-13-21 @ 08:00)  HR: 90 (04-13-21 @ 10:00) (80 - 104)  BP: --  RR: 16 (04-13-21 @ 10:00) (12 - 18)  SpO2: 100% (04-13-21 @ 10:00) (98% - 100%)  Wt(kg): --  Height (cm): 157.5 (04-12-21 @ 12:12)  Weight (kg): 58 (04-12-21 @ 12:12)  BMI (kg/m2): 23.4 (04-12-21 @ 12:12)  BSA (m2): 1.58 (04-12-21 @ 12:12)      04-12-21 @ 07:01  -  04-13-21 @ 07:00  --------------------------------------------------------  IN: 1997.9 mL / OUT: 58 mL / NET: 1939.9 mL    04-13-21 @ 07:01  -  04-13-21 @ 11:31  --------------------------------------------------------  IN: 44.9 mL / OUT: 25 mL / NET: 19.9 mL      Physical Exam:  	Gen: Intubated  	Pulm: CTA B/L  	CV: RRR, S1S2  	Abd: +BS, soft, nontender/nondistended  	: No suprapubic tenderness  	LE: Warm, trace edema  	Vascular access: TDC    LABS/STUDIES  --------------------------------------------------------------------------------              8.1    8.59  >-----------<  192      [04-13-21 @ 04:40]              26.5     138  |  100  |  46  ----------------------------<  182      [04-13-21 @ 04:40]  4.6   |  22  |  4.2        Ca     8.4     [04-13-21 @ 04:40]      Mg     2.1     [04-13-21 @ 04:40]    TPro  5.4  /  Alb  2.7  /  TBili  0.2  /  DBili  x   /  AST  16  /  ALT  <5  /  AlkPhos  188  [04-13-21 @ 04:40]    PT/INR: PT 16.80, INR 1.46       [04-13-21 @ 04:40]  PTT: 29.3       [04-13-21 @ 04:40]      Creatinine Trend:  SCr 4.2 [04-13 @ 04:40]  SCr 3.7 [04-12 @ 05:08]  SCr 3.1 [04-11 @ 04:30]  SCr 3.7 [04-10 @ 04:11]  SCr 3.1 [04-09 @ 04:40]    Urinalysis - [04-02-21 @ 10:44]      Color Yellow / Appearance Turbid / SG 1.027 / pH 7.0      Gluc Negative / Ketone Negative  / Bili Negative / Urobili <2 mg/dL       Blood Moderate / Protein 100 mg/dL / Leuk Est Large / Nitrite Negative      RBC 14 / WBC >720 / Hyaline 16 / Gran  / Sq Epi  / Non Sq Epi 3 / Bacteria Moderate    Iron 55, TIBC 167, %sat 33      [05-27-20 @ 13:22]  Ferritin 636      [05-27-20 @ 13:22]  HbA1c 5.7      [11-09-19 @ 01:20]  TSH 1.71      [04-06-21 @ 04:40]

## 2021-04-13 NOTE — PROGRESS NOTE ADULT - ASSESSMENT
IMPRESSION:  - Cardiac arrest in the setting of arrythmia? s/p cath 4/9/21 triple vessel disease with open grafts  - RT Humerus/Rt femur fracture/Rt Hip fracture - pending hip surgery  - Seizure unknown etiology  - Aspiration Pneumonia vs Adenovirus Pneumonia  - ESRD  - Odontoid fracture  - h/o DM     PLAN:    CNS:   - Spontaneous awakening trial    HEENT:  - Oral care    PULMONARY:   - HOB @ 45 degrees.  Vent management per pulm/crit    CARDIOVASCULAR:  - s/p cath: patent grafts - no significant aortic valve gradient  - TTE noted: EF 38% - severe AS based on echo (but no gradient in cath)  - pAF on amiodarone - continue amiodarone  - Will need betablockers and ACE/ARB once off pressors  - s/p AICD for secondary prevention 4/12/21  - F/U EP    GI:   - GI prophylaxis.  Feeding     RENAL:    - Follow up lytes.  Correct as needed  - HD as per renal    INFECTIOUS DISEASE:   - Follow up cultures  - ABx per critical care team    HEMATOLOGICAL:    - DVT prophylaxis for now  - will need full anticoagulation for AF once cleared    ENDOCRINE:    - Follow up FS.  Insulin protocol if needed.    MUSCULOSKELETAL:  - plan for hip surgery    DISPO: ICU

## 2021-04-14 NOTE — PROGRESS NOTE ADULT - SUBJECTIVE AND OBJECTIVE BOX
ORTHOPEDIC SURGERY PRE OP NOTE      Diagnosis: Right intertrochanteric femur fracture    Planned Procedure: Open reduction internal fixation right femur    Consent: TO BE OBTAINED BY ATTENDING  Risks/benefits/alternatives were discussed with the patient/family and they wish to proceed with surgery.     ANTICIPATED DATE OF PROCEDURE: 4/14  SCHEDULED CASE OR ADD-ON CASE: Add on    Clearances:   [ ] PENDING DAY TEAM    T(C): 37.1 (04-14-21 @ 02:30), Max: 37.7 (04-13-21 @ 08:00)  HR: 74 (04-14-21 @ 07:00) (74 - 138)  RR: 13 (04-14-21 @ 07:00) (13 - 28)  SpO2: 100% (04-14-21 @ 07:00) (91% - 100%)    Labs:                        8.5    7.05  )-----------( 159      ( 14 Apr 2021 05:15 )             27.3     04-14    142  |  104  |  38<H>  ----------------------------<  220<H>  3.8   |  24  |  3.6<H>    Ca    8.1<L>      14 Apr 2021 05:15  Mg     2.0     04-14  TPro  4.8<L>  /  Alb  2.6<L>  /  TBili  0.2  /  DBili  x   /  AST  18  /  ALT  <5  /  AlkPhos  196<H>  04-14  PT/INR - ( 14 Apr 2021 05:15 )   PT: 17.70 sec;   INR: 1.54 ratio    PTT - ( 14 Apr 2021 05:15 )  PTT:36.1 sec  Type and Screen X 2: [X]    COVID-19 PCR: NotDetec (13 Apr 2021 22:44)  COVID-19 PCR: NotDetec (11 Apr 2021 12:50)  COVID-19 PCR: NotDetec (07 Apr 2021 06:41)  COVID-19 PCR: NotDetec (03 Apr 2021 16:59)  SARS-CoV-2: NotDetec (01 Apr 2021 06:10)  COVID-19 PCR: NotDetec (09 Mar 2021 11:17)  SARS-CoV-2: NotDetec (02 Mar 2021 15:40)    [X] EKG   [X] CXR     DIET: NPO after midnight  IVF: per primary team    ANTICOAGULATION STATUS (include name of anticoagulant) :  [X] HEPARIN DRIP STOPPED AT 3AM    A/P: Patient is a 70y y/o Female Pending right femur ORIF 4/14    [x] 1u RBC overnight  [X] NPO and IVF @ midnight  [x] pain control/analgesia prn per primary team   [ ]F/U Clearance  [ ]Notify Ortho with any questions- [facility] [phone]    [x] DISCUSSED WITH PRIMARY TEAM MEMBER: Gabrielle  [x] Date and Time DISCUSSED WITH PRIMARY TEAM MEMBER: 4/13 at 22:45

## 2021-04-14 NOTE — PROGRESS NOTE ADULT - SUBJECTIVE AND OBJECTIVE BOX
ORTHOPEDIC SURGERY PRE OP NOTE  MRN: [id number]    Diagnosis: Right intertrochanteric hip fracture; right proximal humerus fracture    Planned Procedure: Right hip open reduction internal fixation     Consent: TO BE OBTAINED BY ATTENDING                   Risks/benefits/alternatives were discussed with the patient/family and they wish to proceed with surgery.       ANTICIPATED DATE OF PROCEDURE : today 21  SCHEDULED CASE OR ADD-ON CASE: added on       Clearances:   [X] Medicine: CCU  [X] EPS  [X] PULMONOLGY      T(C): 36.5 (21 @ 08:00), Max: 37.6 (21 @ 12:00)  HR: 74 (21 @ 10:00) (74 - 138)  BP: --  RR: 14 (21 @ 10:00) (13 - 28)  SpO2: 100% (21 @ 10:00) (91% - 100%)    Labs:                        8.5    7.05  )-----------( 159      ( 2021 05:15 )             27.3         142  |  104  |  38<H>  ----------------------------<  220<H>  3.8   |  24  |  3.6<H>    Ca    8.1<L>      2021 05:15  Mg     2.0         TPro  4.8<L>  /  Alb  2.6<L>  /  TBili  0.2  /  DBili  x   /  AST  18  /  ALT  <5  /  AlkPhos  196<H>      PT/INR - ( 2021 05:15 )   PT: 17.70 sec;   INR: 1.54 ratio         PTT - ( 2021 05:15 )  PTT:36.1 sec  Type and Screen X 2:    COVID-19 PCR: NotDetec (2021 22:44)  COVID-19 PCR: NotDetec (2021 12:50)  COVID-19 PCR: NotDetec (2021 06:41)  COVID-19 PCR: NotDetec (2021 16:59)  SARS-CoV-2: NotDetec (2021 06:10)  COVID-19 PCR: NotDete (09 Mar 2021 11:17)  SARS-CoV-2: [facility] (02 Mar 2021 15:40)    [X]EK21  Diagnosis Line Atrial fibrillation  Left axis deviation  Right bundle branch block  Moderate voltage criteria for LVH, may be normal variant  T wave abnormality, consider lateral ischemia  Abnormal ECG  [X]CXR: 2021  IMPRESSION:  Support devices: No change in right IJ dialysis catheter endotracheal tube and enteric tube and left IJ line.  Cardiac/mediastinum/hilum: Stable.  Lung parenchyma/Pleura: Resolving bilateral opacities in the lungs. No pneumothorax.  Skeleton/soft tissues: Stable.    DIET: NPO after midnight  IVF: per primary team      ANTICOAGULATION STATUS ( include name of anticoagulant) : HEPARIN  HAS BEEN HELD TODAY ; WILL RESTART POST OPERATIVELY     A/P: Patient is a 70y y/o Female Pending right hip open reduction internal fixation     [ ] -NPO and IVF @ midnight  [ ]pain control/analgesia prn per primary team   [ ]Incentive Spirometry   [ ]Cleared  [ ]Restart anticoagulation post op  [ ]Notify Ortho with any questions- spectra [phone]    [ ]DISCUSSED WITH PRIMARY TEAM MEMBER (name of team member): CCU intern & Senior Resident @ [id number]; Patient cleared for OR today   [ ]Date and Time DISCUSSED WITH PRIMARY TEAM MEMBER: 2021; 10:30AM [id number]

## 2021-04-14 NOTE — PROGRESS NOTE ADULT - ASSESSMENT
Impression:  ARF   cardiac arrest unknown etiology   arrythmia V tach ./ afib s/p AICD   multiple fx , hip, humerus , neck   shock ?? cardiogenic   seizure   HD   left arm dvt     PLAN:    CNS: keppra on precedex and fentanyl       HEENT: oral care     PULMONARY: keep same vent setting will do weaning  post ORIF   from pulmonary she is on minimal vent setting  she is stable for surgery   follow cardiology eval     CARDIOVASCULAR: follow EP / ct surgery   cardiology follow up for clearance     GI: GI prophylaxis. NPO for OR      RENAL: HD at bed side     INFECTIOUS DISEASE: off abx   repeat bld   procal     HEMATOLOGICAL:  DVT prophylaxis. follow h/h  off heparin for OR     ENDOCRINE:  Follow up FS.  Insulin protocol if needed.    for ORIF today     CRITICAL CARE TIME SPENT: ***

## 2021-04-14 NOTE — PROGRESS NOTE ADULT - ATTENDING COMMENTS
Patient remains intubated/sedated. She is POD#2 of CRT-D. Plan for ORIF today.       Hold A/C and feeds for OR today  S/p low dose digoxin   Continue amiodarone   HD per schedule

## 2021-04-14 NOTE — CHART NOTE - NSCHARTNOTEFT_GEN_A_CORE
Right hip ORIF canceled today.   Patient will be added on for tomorrow.   Spoke with Medicine Resident today at [id number] @17:18 to restart anticoagulation until tomorrow when it should be discontinued again in the AM for anticipated surgery.     Spoke with Family extensively at bedside about cancellation. They are understandable but voice their concerns that the CCU will not extubate the patient until she goes to the OR for hip ORIF. Patient is now awake so the family would like for her to be extubated.

## 2021-04-14 NOTE — PROGRESS NOTE ADULT - SUBJECTIVE AND OBJECTIVE BOX
SUBJECTIVE:    Patient is a 70y old Female who presents with a chief complaint of s/p mechanical fall (13 Apr 2021 15:03)    Currently admitted to medicine with the primary diagnosis of Cardiopulmonary arrest       Today is hospital day 13d. remains intubated    PAST MEDICAL & SURGICAL HISTORY  Diabetes    Congestive heart failure (CHF)    Pleural effusion due to congestive heart failure    End stage renal disease  on dialysis Mon, Wed, Fr    Uterine prolapse    Chronic indwelling Castellon catheter    History of repair of hip fracture  2019    S/P CABG (coronary artery bypass graft)  11/2019    History of thoracentesis    Chronic indwelling Castellon catheter      SOCIAL HISTORY:  Negative for smoking/alcohol/drug use.     ALLERGIES:  No Known Allergies    MEDICATIONS:  STANDING MEDICATIONS  aMIOdarone    Tablet 200 milliGRAM(s) Oral two times a day  aspirin  chewable 81 milliGRAM(s) Oral daily  atorvastatin Oral Tab/Cap - Peds 40 milliGRAM(s) Oral daily  BACItracin   Ointment 1 Application(s) Topical two times a day  chlorhexidine 0.12% Liquid 15 milliLiter(s) Oral Mucosa every 12 hours  chlorhexidine 4% Liquid 1 Application(s) Topical <User Schedule>  dexMEDEtomidine Infusion 0.2 MICROgram(s)/kG/Hr IV Continuous <Continuous>  doxercalciferol Injectable 2 MICROGram(s) IV Push <User Schedule>  epoetin mana-epbx (RETACRIT) Injectable 44896 Unit(s) IV Push <User Schedule>  fentaNYL   Infusion. 0.5 MICROgram(s)/kG/Hr IV Continuous <Continuous>  heparin  Infusion 1100 Unit(s)/Hr IV Continuous <Continuous>  lactulose Syrup 15 Gram(s) Oral three times a day  levETIRAcetam  IVPB 375 milliGRAM(s) IV Intermittent every 12 hours  levETIRAcetam  IVPB 250 milliGRAM(s) IV Intermittent <User Schedule>  pantoprazole  Injectable 40 milliGRAM(s) IV Push daily  phenylephrine    Infusion 0.1 MICROgram(s)/kG/Min IV Continuous <Continuous>  polyethylene glycol 3350 17 Gram(s) Oral at bedtime  senna 2 Tablet(s) Oral at bedtime  valproic  acid Syrup 250 milliGRAM(s) Oral two times a day    PRN MEDICATIONS    VITALS:   T(F): 97.7  HR: 74  BP: --  RR: 14  SpO2: 100%    LABS:                        8.5    7.05  )-----------( 159      ( 14 Apr 2021 05:15 )             27.3     04-14    142  |  104  |  38<H>  ----------------------------<  220<H>  3.8   |  24  |  3.6<H>    Ca    8.1<L>      14 Apr 2021 05:15  Mg     2.0     04-14    TPro  4.8<L>  /  Alb  2.6<L>  /  TBili  0.2  /  DBili  x   /  AST  18  /  ALT  <5  /  AlkPhos  196<H>  04-14    PT/INR - ( 14 Apr 2021 05:15 )   PT: 17.70 sec;   INR: 1.54 ratio         PTT - ( 14 Apr 2021 05:15 )  PTT:36.1 sec    ABG - ( 14 Apr 2021 04:30 )  pH, Arterial: 7.38  pH, Blood: x     /  pCO2: 48    /  pO2: 160   / HCO3: 28    / Base Excess: 2.7   /  SaO2: 100                   Culture - Blood (collected 12 Apr 2021 11:00)  Source: .Blood Blood-Venous  Preliminary Report (13 Apr 2021 22:01):    No growth to date.          RADIOLOGY:    PHYSICAL EXAM:  GEN: No acute distress  LUNGS: bilateral ronchi  HEART: S1/S2 present. irregular.   ABD: Soft, non-tender, non-distended. Bowel sounds present  EXT: NC/NC/NE/2+PP/GEORGE  NEURO: AAOX3

## 2021-04-14 NOTE — PROGRESS NOTE ADULT - SUBJECTIVE AND OBJECTIVE BOX
Patient is a 70y old  Female who presents with a chief complaint of s/p mechanical fall (13 Apr 2021 15:03)      Over Night Events:  Patient seen and examined.   still vented on precedex , fentanyl   on carolyne less 0.09   heparin on hold this morning for surgery   had episode of afib yesterday   ROS:  See HPI    PHYSICAL EXAM    ICU Vital Signs Last 24 Hrs  T(C): 37.1 (14 Apr 2021 04:00), Max: 37.7 (13 Apr 2021 08:00)  T(F): 98.7 (14 Apr 2021 04:00), Max: 99.8 (13 Apr 2021 08:00)  HR: 74 (14 Apr 2021 07:00) (74 - 138)  BP: --  BP(mean): --  ABP: 94/52 (14 Apr 2021 07:00) (92/50 - 142/76)  ABP(mean): 66 (14 Apr 2021 07:00) (66 - 102)  RR: 13 (14 Apr 2021 07:00) (13 - 28)  SpO2: 100% (14 Apr 2021 07:00) (91% - 100%)      General: open eyes   HEENT:       et tube          Lymph Nodes: NO cervical LN   Lungs: Bilateral BS  Cardiovascular: Regular   Abdomen: Soft, Positive BS  Extremities: No clubbing   Skin: warm   Neurological: positive gag   Musculoskeletal: move all ext     I&O's Detail    13 Apr 2021 07:01  -  14 Apr 2021 07:00  --------------------------------------------------------  IN:    Dexmedetomidine: 210.7 mL    Enteral Tube Flush: 70 mL    FentaNYL: 164.5 mL    Heparin: 22 mL    Heparin: 33 mL    Heparin Infusion: 55 mL    IV PiggyBack: 250 mL    Nepro: 440 mL    Phenylephrine: 159 mL    PRBCs (Packed Red Blood Cells): 400 mL  Total IN: 1804.2 mL    OUT:    Indwelling Catheter - Urethral (mL): 71 mL    Other (mL): 1000 mL  Total OUT: 1071 mL    Total NET: 733.2 mL          LABS:                          8.5    7.05  )-----------( 159      ( 14 Apr 2021 05:15 )             27.3         14 Apr 2021 05:15    142    |  104    |  38     ----------------------------<  220    3.8     |  24     |  3.6      Ca    8.1        14 Apr 2021 05:15  Mg     2.0       14 Apr 2021 05:15    TPro  4.8    /  Alb  2.6    /  TBili  0.2    /  DBili  x      /  AST  18     /  ALT  <5     /  AlkPhos  196    14 Apr 2021 05:15  Amylase x     lipase x                                                 PT/INR - ( 14 Apr 2021 05:15 )   PT: 17.70 sec;   INR: 1.54 ratio         PTT - ( 14 Apr 2021 05:15 )  PTT:36.1 sec                                                                                                   Culture - Blood (collected 12 Apr 2021 11:00)  Source: .Blood Blood-Venous  Preliminary Report (13 Apr 2021 22:01):    No growth to date.                                                   Mode: AC/ CMV (Assist Control/ Continuous Mandatory Ventilation)  RR (machine): 12  TV (machine): 350  FiO2: 30  PEEP: 5  MAP: 8  PIP: 17                                      ABG - ( 14 Apr 2021 04:30 )  pH, Arterial: 7.38  pH, Blood: x     /  pCO2: 48    /  pO2: 160   / HCO3: 28    / Base Excess: 2.7   /  SaO2: 100                 MEDICATIONS  (STANDING):  aMIOdarone    Tablet 200 milliGRAM(s) Oral two times a day  aspirin  chewable 81 milliGRAM(s) Oral daily  atorvastatin Oral Tab/Cap - Peds 40 milliGRAM(s) Oral daily  BACItracin   Ointment 1 Application(s) Topical two times a day  chlorhexidine 0.12% Liquid 15 milliLiter(s) Oral Mucosa every 12 hours  chlorhexidine 4% Liquid 1 Application(s) Topical <User Schedule>  dexMEDEtomidine Infusion 0.2 MICROgram(s)/kG/Hr (3.3 mL/Hr) IV Continuous <Continuous>  doxercalciferol Injectable 2 MICROGram(s) IV Push <User Schedule>  epoetin mana-epbx (RETACRIT) Injectable 06731 Unit(s) IV Push <User Schedule>  fentaNYL   Infusion. 0.5 MICROgram(s)/kG/Hr (2.9 mL/Hr) IV Continuous <Continuous>  heparin  Infusion 1100 Unit(s)/Hr (11 mL/Hr) IV Continuous <Continuous>  lactulose Syrup 15 Gram(s) Oral three times a day  levETIRAcetam  IVPB 375 milliGRAM(s) IV Intermittent every 12 hours  levETIRAcetam  IVPB 250 milliGRAM(s) IV Intermittent <User Schedule>  pantoprazole  Injectable 40 milliGRAM(s) IV Push daily  phenylephrine    Infusion 0.1 MICROgram(s)/kG/Min (1.09 mL/Hr) IV Continuous <Continuous>  polyethylene glycol 3350 17 Gram(s) Oral at bedtime  valproic  acid Syrup 250 milliGRAM(s) Oral two times a day    MEDICATIONS  (PRN):          Xrays:  TLC:  OG:  ET tube:                                                                                    small left effusion atelectasis    ECHO:  [facility] ICU:

## 2021-04-14 NOTE — PRE-ANESTHESIA EVALUATION ADULT - NSANTHLABRESULTSFT_GEN_ALL_CORE
PULMONARY: keep same vent setting will do weaning  post ORIF   from pulmonary she is on minimal vent setting  she is stable for surgery   follow cardiology eval     CARDIOVASCULAR: follow EP / ct surgery   cardiology follow up for clearance

## 2021-04-14 NOTE — PROGRESS NOTE ADULT - SUBJECTIVE AND OBJECTIVE BOX
[facility] NEPHROLOGY FOLLOW UP NOTE  --------------------------------------------------------------------------------  24 hour events/subjective: Patient examined. Intubated.    PAST HISTORY  --------------------------------------------------------------------------------  No significant changes to PMH, PSH, FHx, SHx, unless otherwise noted    ALLERGIES & MEDICATIONS  --------------------------------------------------------------------------------  Allergies    No Known Allergies    Standing Inpatient Medications  aMIOdarone    Tablet 200 milliGRAM(s) Oral two times a day  aspirin  chewable 81 milliGRAM(s) Oral daily  atorvastatin Oral Tab/Cap - Peds 40 milliGRAM(s) Oral daily  BACItracin   Ointment 1 Application(s) Topical two times a day  chlorhexidine 0.12% Liquid 15 milliLiter(s) Oral Mucosa every 12 hours  chlorhexidine 4% Liquid 1 Application(s) Topical <User Schedule>  dexMEDEtomidine Infusion 0.2 MICROgram(s)/kG/Hr IV Continuous <Continuous>  doxercalciferol Injectable 2 MICROGram(s) IV Push <User Schedule>  epoetin mana-epbx (RETACRIT) Injectable 58713 Unit(s) IV Push <User Schedule>  fentaNYL   Infusion. 0.5 MICROgram(s)/kG/Hr IV Continuous <Continuous>  heparin  Infusion 1100 Unit(s)/Hr IV Continuous <Continuous>  lactulose Syrup 15 Gram(s) Oral three times a day  levETIRAcetam  IVPB 375 milliGRAM(s) IV Intermittent every 12 hours  levETIRAcetam  IVPB 250 milliGRAM(s) IV Intermittent <User Schedule>  pantoprazole  Injectable 40 milliGRAM(s) IV Push daily  phenylephrine    Infusion 0.1 MICROgram(s)/kG/Min IV Continuous <Continuous>  polyethylene glycol 3350 17 Gram(s) Oral at bedtime  senna 2 Tablet(s) Oral at bedtime  valproic  acid Syrup 250 milliGRAM(s) Oral two times a day    VITALS/PHYSICAL EXAM  --------------------------------------------------------------------------------  T(C): 36.5 (04-14-21 @ 08:00), Max: 37.6 (04-13-21 @ 12:00)  HR: 74 (04-14-21 @ 10:00) (74 - 138)  BP: --  RR: 14 (04-14-21 @ 10:00) (13 - 28)  SpO2: 100% (04-14-21 @ 10:00) (91% - 100%)  Wt(kg): --  Height (cm): 157.5 (04-12-21 @ 12:12)  Weight (kg): 58 (04-12-21 @ 12:12)  BMI (kg/m2): 23.4 (04-12-21 @ 12:12)  BSA (m2): 1.58 (04-12-21 @ 12:12)      04-13-21 @ 07:01  -  04-14-21 @ 07:00  --------------------------------------------------------  IN: 1804.2 mL / OUT: 1071 mL / NET: 733.2 mL    04-14-21 @ 07:01  -  04-14-21 @ 11:58  --------------------------------------------------------  IN: 79.6 mL / OUT: 0 mL / NET: 79.6 mL      Physical Exam:  	Gen: Intubated  	Pulm: CTA B/L  	CV: RRR, S1S2  	Abd: +BS, soft, nondistended  	: No suprapubic tenderness  	LE: Warm,  no edema  	Vascular access: TDC    LABS/STUDIES  --------------------------------------------------------------------------------              8.5    7.05  >-----------<  159      [04-14-21 @ 05:15]              27.3     142  |  104  |  38  ----------------------------<  220      [04-14-21 @ 05:15]  3.8   |  24  |  3.6        Ca     8.1     [04-14-21 @ 05:15]      Mg     2.0     [04-14-21 @ 05:15]    TPro  4.8  /  Alb  2.6  /  TBili  0.2  /  DBili  x   /  AST  18  /  ALT  <5  /  AlkPhos  196  [04-14-21 @ 05:15]    PT/INR: PT 17.70, INR 1.54       [04-14-21 @ 05:15]  PTT: 36.1       [04-14-21 @ 05:15]      Creatinine Trend:  SCr 3.6 [04-14 @ 05:15]  SCr 4.2 [04-13 @ 04:40]  SCr 3.7 [04-12 @ 05:08]  SCr 3.1 [04-11 @ 04:30]  SCr 3.7 [04-10 @ 04:11]    Urinalysis - [04-02-21 @ 10:44]      Color Yellow / Appearance Turbid / SG 1.027 / pH 7.0      Gluc Negative / Ketone Negative  / Bili Negative / Urobili <2 mg/dL       Blood Moderate / Protein 100 mg/dL / Leuk Est Large / Nitrite Negative      RBC 14 / WBC >720 / Hyaline 16 / Gran  / Sq Epi  / Non Sq Epi 3 / Bacteria Moderate      Iron 55, TIBC 167, %sat 33      [05-27-20 @ 13:22]  Ferritin 636      [05-27-20 @ 13:22]  HbA1c 5.7      [11-09-19 @ 01:20]  TSH 1.71      [04-06-21 @ 04:40]

## 2021-04-14 NOTE — PROGRESS NOTE ADULT - SUBJECTIVE AND OBJECTIVE BOX
HPI  Patient is a 70y old Female who presents with a chief complaint of s/p mechanical fall (13 Apr 2021 15:03)    Currently admitted to medicine with the primary diagnosis of Cardiopulmonary arrest       Today is hospital day 13d.     INTERVAL HPI / OVERNIGHT EVENTS:  Patient was examined and seen at bedside. Pt is intubated and sedated. Yesterday patient went into Afib. S/p  lopressor 5mg x2.    ROS: Otherwise unremarkable     PAST MEDICAL & SURGICAL HISTORY  Diabetes    Congestive heart failure (CHF)    Pleural effusion due to congestive heart failure    End stage renal disease  on dialysis Mon, Wed, Fr    Uterine prolapse    Chronic indwelling Castellon catheter    History of repair of hip fracture  2019    S/P CABG (coronary artery bypass graft)  11/2019    History of thoracentesis    Chronic indwelling Castellon catheter      ALLERGIES  No Known Allergies    MEDICATIONS  STANDING MEDICATIONS  aMIOdarone    Tablet 200 milliGRAM(s) Oral two times a day  aspirin  chewable 81 milliGRAM(s) Oral daily  atorvastatin Oral Tab/Cap - Peds 40 milliGRAM(s) Oral daily  BACItracin   Ointment 1 Application(s) Topical two times a day  chlorhexidine 0.12% Liquid 15 milliLiter(s) Oral Mucosa every 12 hours  chlorhexidine 4% Liquid 1 Application(s) Topical <User Schedule>  dexMEDEtomidine Infusion 0.2 MICROgram(s)/kG/Hr IV Continuous <Continuous>  doxercalciferol Injectable 2 MICROGram(s) IV Push <User Schedule>  epoetin mana-epbx (RETACRIT) Injectable 00802 Unit(s) IV Push <User Schedule>  fentaNYL   Infusion. 0.5 MICROgram(s)/kG/Hr IV Continuous <Continuous>  heparin  Infusion 1100 Unit(s)/Hr IV Continuous <Continuous>  lactulose Syrup 15 Gram(s) Oral three times a day  levETIRAcetam  IVPB 375 milliGRAM(s) IV Intermittent every 12 hours  levETIRAcetam  IVPB 250 milliGRAM(s) IV Intermittent <User Schedule>  pantoprazole  Injectable 40 milliGRAM(s) IV Push daily  phenylephrine    Infusion 0.1 MICROgram(s)/kG/Min IV Continuous <Continuous>  polyethylene glycol 3350 17 Gram(s) Oral at bedtime  senna 2 Tablet(s) Oral at bedtime  valproic  acid Syrup 250 milliGRAM(s) Oral two times a day    PRN MEDICATIONS    VITALS:  T(F): 97.7  HR: 76  BP: --  RR: 12  SpO2: 100%    PHYSICAL EXAM  GEN: NAD, Resting comfortably in bed  PULM: Clear to auscultation bilaterally, No wheezes  CVS: Regular rate and rhythm, S1-S2, no murmurs  ABD: Soft, non-tender, non-distended, no guarding  EXT: No edema  NEURO: A&Ox3, no focal deficits    LABS                        8.5    7.05  )-----------( 159      ( 14 Apr 2021 05:15 )             27.3     04-14    142  |  104  |  38<H>  ----------------------------<  220<H>  3.8   |  24  |  3.6<H>    Ca    8.1<L>      14 Apr 2021 05:15  Mg     2.0     04-14    TPro  4.8<L>  /  Alb  2.6<L>  /  TBili  0.2  /  DBili  x   /  AST  18  /  ALT  <5  /  AlkPhos  196<H>  04-14    PT/INR - ( 14 Apr 2021 05:15 )   PT: 17.70 sec;   INR: 1.54 ratio         PTT - ( 14 Apr 2021 05:15 )  PTT:36.1 sec    ABG - ( 14 Apr 2021 04:30 )  pH, Arterial: 7.38  pH, Blood: x     /  pCO2: 48    /  pO2: 160   / HCO3: 28    / Base Excess: 2.7   /  SaO2: 100         Culture - Blood (collected 12 Apr 2021 11:00)  Source: .Blood Blood-Venous  Preliminary Report (13 Apr 2021 22:01):    No growth to date.          RADIOLOGY    Assessment and Plan:   · Assessment	  69 y/o F with a pmhx of CAD s/p CABG (Nov 2019), HFrEF (EF 20%), AS, ESRD (MWF), bladder prolapse requiring chronic Castellon, Diabetes Type 2 on insulin presents from home s/p mechanical fall. Trauma workup in ED significant for R humeral fracture / R hip fracture / non displaced odontoid fracture/ acute rib fractures/ ? T6 vertebral fracture. In the ED patient arrested, ROSC achieved after 2 minutes of CPR. EKG at the time showed RBBB with wide complex tachy. Pt subsequently intubated and sedated, started on pressors. Pt Experienced seizure-like episode during weaning trial s/p 2mg Versed.  (4/2/21) Weaning trial passed. Patient was Extubated . 02 sat began to decrease to low 80s, tachypenic and cyanotic started on bipap with no improvement. Patient again became hypotensive and tachy. Went into Afib. s/p cardioversion x3 and started on amio     # RT Humerus/Rt femur fracture/Rt Hip fracture   - Ortho following  - Pain control. Currently on Fentanyl drip   - Patient taken to the OR 4/8. Procedure cancelled because patient went into SVT, s/p adenosine. Started on amio at the time   - Plan for ORIF of Right femur today. Holding Heparin. NPO since midnight       #Cardiac arrest in the setting of arythmia and CHF exacerbation   - s/p LHC (4/9) - Patent LIMA and SVG grafts, Transaortic gradient ~ 10-15mmHg  - Trops .22-->.24 -->.21-->.34 (4/4/21)  - CTA negative for PE   - Lower extremity duplex negative for DVT( 4/6/21)  - s/p BIV ICD placement. EP interrogation no events   - Patient is NPO for procedure   - Holding heparin     #LUE DVT  - UE duplex : Thrombus noted in the left brachial vein.  - Started on Heparin   - Holding heparin today for ORIF today    # Seizure unknown etiology  - CT Head (4/1) negative   - Keppra 500mg adminstered. followed by 375mg BID with additional 250mg dose after dialysis  - Started on Depakote 250mg PO BID   - VEEG - bifrontal sharps but no seizure activity. Consistent with metabolic process.     # Aspiration Pneumonia vs Adenovirus Pneumonia   - CXR : Stable bilateral opacities. No pneumothorax. Support devices, in satisfactory position.  - s/p Completion of ceftriaxone       # Perirectal abscess  - surgery is following patient. Dr Mota   - s/p debridement over a month. No need for packing as per surgery     #Odontoid fracture   - Neurosurgery following patient   - Want to c/w collar      #ESRD  - Nephro following patient  - HD tuesday 4/13    # h/o DM   - fingersticks tid  - hold home insulin  - insulin protocol if > 180    # DVT PPX: heparin subq  # GI PPX: PPI  # Diet: NPO   # Activity: bedrest  # Bowel regimen

## 2021-04-14 NOTE — PROGRESS NOTE ADULT - ASSESSMENT
1. Odontoid fracture. Neurosurgery following.  2. R hip / R humerus fracture. Ortho following. ORIF this week.  3. ESRD on HD TTS. HD tomorrow: 3 hours, opti 160 dialyzer, 3K bath, 2L UF.  4. Anemia. EPO with HD.  5. Secondary hyperparathyroidism. Hectorol with HD.

## 2021-04-14 NOTE — PRE-ANESTHESIA EVALUATION ADULT - NSANTHPMHFT_GEN_ALL_CORE
71 yo F with PMH of CAD s/p CABG (November 2019), HFrEF (EF 20%), AS, ESRD (MWF), bladder prolapse requiring chronic Castellon, Diabetes Type 2 on insulin presents from home s/p mechanical fall. When the history was taken in the emergency department, she The denied any dizziness/ palpitations/ aura/vertigo. She reported non-radiating R shoulder pain and non-radiating R hip pain on arrival. She was found to have R humeral fracture / R hip fracture / non displaced odontoid fracture/ acute rib fractures/  T6 vertebral fracture. Pt also found to have c2 fracture with c collar in place.     In the emergency department, while the patient was receiving trauma work up she was found pulseless by a PCA in the room ( she was not on the monitor at that time) and had a cardiac arrest. ROSC was achieved after 2 minutes of CPR (1 epinephrine was given and 1 calcium chloride iv). Patient was intubated and sedated. EKG showed RBBB with wide complex tachycardia. Per family at the bedside, the patient had her last hemodialysis yesterday and has been relatively non compliant with her medications and medical care at home. Unable to obtain ros as patient is sedated. Pt found to have paroxysmal AF w/RVR s/p DCCV and amiodarone drip, can not rule out VT as the cause of cardiac arrest => in NSR now CHADSVASc 5. CT surgery consulted for BIV implantation.       pt currently sedated and intubated on pressors  Pt has C2 fracture with c collar in place- as per neurosurgery keep in place at all time unless medically necessary to remove, if removed please keep neck stable at all time.    # RT Humerus/Rt femur fracture/Rt Hip fracture   - Ortho following  - Pain control. Currently on Fentanyl drip   - Patient taken to the OR 4/8. Procedure cancelled because patient went into SVT, s/p adenosine. Started on amio at the time   - Plan for ORIF of Right femur today. Holding Heparin. NPO since midnight       #Cardiac arrest in the setting of arythmia and CHF exacerbation   - s/p LHC (4/9) - Patent LIMA and SVG grafts, Transaortic gradient ~ 10-15mmHg  - Trops .22-->.24 -->.21-->.34 (4/4/21)  - CTA negative for PE   - Lower extremity duplex negative for DVT( 4/6/21)  - s/p BIV ICD placement. EP interrogation no events   - Patient is NPO for procedure   - Holding heparin     #LUE DVT  - UE duplex : Thrombus noted in the left brachial vein.  - Started on Heparin   - Holding heparin today for ORIF today    # Seizure unknown etiology  - CT Head (4/1) negative   - Keppra 500mg adminstered. followed by 375mg BID with additional 250mg dose after dialysis  - Started on Depakote 250mg PO BID   - VEEG - bifrontal sharps but no seizure activity. Consistent with metabolic process.     # Aspiration Pneumonia vs Adenovirus Pneumonia   - CXR : Stable bilateral opacities. No pneumothorax. Support devices, in satisfactory position.  - s/p Completion of ceftriaxone       # Perirectal abscess  - surgery is following patient. Dr Mota   - s/p debridement over a month. No need for packing as per surgery     #Odontoid fracture   - Neurosurgery following patient   - Want to c/w collar      #ESRD  - Nephro following patient  - HD tuesday 4/13    # h/o DM   - fingersticks tid  - hold home insulin  - insulin protocol if > 180    # DVT PPX: heparin subq  # GI PPX: PPI  # Diet: NPO   # Activity: bedrest  # Bowel regimen

## 2021-04-15 NOTE — PROGRESS NOTE ADULT - ASSESSMENT
IMPRESSION:  - Cardiac arrest in the setting of arrythmia? 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  Vent management per pulm/crit    CARDIOVASCULAR:  - s/p cath: patent grafts - no significant aortic valve gradient  - TTE noted: EF 38% - severe AS based on echo (but no gradient in cath)  - pAF on amiodarone - continue amiodarone  - Heparin drip for AF  - Will need betablockers also once stable  - s/p AICD for secondary prevention 4/12/21  - F/U EP    GI:   - GI prophylaxis.  Feeding     RENAL:    - Follow up lytes.  Correct as needed  - HD as per renal    INFECTIOUS DISEASE:   - Follow up cultures  - ABx per critical care team    HEMATOLOGICAL:    - DVT prophylaxis - on heparin drip    ENDOCRINE:    - Follow up FS.  Insulin protocol if needed.    MUSCULOSKELETAL:  - plan for hip surgery today    DISPO: ICU

## 2021-04-15 NOTE — PROGRESS NOTE ADULT - ASSESSMENT
1. Odontoid fracture. Neurosurgery following.  2. R hip / R humerus fracture. Ortho following. ORIF this week.  3. ESRD on HD TTS. HD today: 3 hours, opti 160 dialyzer, 3K bath, 2L UF.  4. Anemia. EPO with HD.  5. Secondary hyperparathyroidism. Hectorol with HD.

## 2021-04-15 NOTE — PROGRESS NOTE ADULT - SUBJECTIVE AND OBJECTIVE BOX
I was requested to cover case due to surgeon availability  Pt. seen' examined  Sedated/ intubated, exam limited  XR/ labs reviewed

## 2021-04-15 NOTE — PROGRESS NOTE ADULT - ATTENDING COMMENTS
Patient remains critically ill, intubated, sedated on pressor support. Going to OR today.     Continue amiodarone PO   Fluid removal via HD - Nephrology f/u   Going to OR today

## 2021-04-15 NOTE — PROGRESS NOTE ADULT - ASSESSMENT
Discussed risks/ benefits/ alternatives of surgery with the daughter  Consent obtained  Proceeding to OR for IMN R femur

## 2021-04-15 NOTE — PROGRESS NOTE ADULT - SUBJECTIVE AND OBJECTIVE BOX
[facility] NEPHROLOGY FOLLOW UP NOTE  --------------------------------------------------------------------------------  24 hour events/subjective: Patient examined during HD. Intubated.    PAST HISTORY  --------------------------------------------------------------------------------  No significant changes to PMH, PSH, FHx, SHx, unless otherwise noted    ALLERGIES & MEDICATIONS  --------------------------------------------------------------------------------  Allergies    No Known Allergies    Intolerances      Standing Inpatient Medications  aMIOdarone    Tablet 200 milliGRAM(s) Oral two times a day  aspirin  chewable 81 milliGRAM(s) Oral daily  atorvastatin Oral Tab/Cap - Peds 40 milliGRAM(s) Oral daily  BACItracin   Ointment 1 Application(s) Topical every 12 hours  chlorhexidine 0.12% Liquid 15 milliLiter(s) Oral Mucosa every 12 hours  chlorhexidine 4% Liquid 1 Application(s) Topical <User Schedule>  dexMEDEtomidine Infusion 0.2 MICROgram(s)/kG/Hr IV Continuous <Continuous>  doxercalciferol Injectable 2 MICROGram(s) IV Push <User Schedule>  epoetin mana-epbx (RETACRIT) Injectable 26861 Unit(s) IV Push <User Schedule>  fentaNYL   Infusion. 0.5 MICROgram(s)/kG/Hr IV Continuous <Continuous>  lactulose Syrup 15 Gram(s) Oral three times a day  levETIRAcetam  IVPB 375 milliGRAM(s) IV Intermittent every 12 hours  levETIRAcetam  IVPB 250 milliGRAM(s) IV Intermittent <User Schedule>  pantoprazole  Injectable 40 milliGRAM(s) IV Push daily  phenylephrine    Infusion 0.1 MICROgram(s)/kG/Min IV Continuous <Continuous>  polyethylene glycol 3350 17 Gram(s) Oral at bedtime  senna 2 Tablet(s) Oral at bedtime  valproic  acid Syrup 250 milliGRAM(s) Oral two times a day    VITALS/PHYSICAL EXAM  --------------------------------------------------------------------------------  T(C): 37.4 (04-15-21 @ 08:00), Max: 37.4 (04-15-21 @ 04:00)  HR: 86 (04-15-21 @ 12:00) (73 - 120)  BP: --  RR: 18 (04-15-21 @ 12:00) (12 - 18)  SpO2: 100% (04-15-21 @ 12:00) (99% - 100%)  Wt(kg): --  Height (cm): 157.5 (04-14-21 @ 21:11)  Weight (kg): 58 (04-14-21 @ 21:11)  BMI (kg/m2): 23.4 (04-14-21 @ 21:11)  BSA (m2): 1.58 (04-14-21 @ 21:11)      04-14-21 @ 07:01  -  04-15-21 @ 07:00  --------------------------------------------------------  IN: 1180.7 mL / OUT: 5 mL / NET: 1175.7 mL    04-15-21 @ 07:01  -  04-15-21 @ 12:08  --------------------------------------------------------  IN: 128 mL / OUT: 2004 mL / NET: -1876 mL    Physical Exam:  	Gen: Intubated  	Pulm: CTA B/L  	CV: RRR, S1S2  	Abd: +BS, soft, nondistended  	LE: Warm,  no edema  	Vascular access: TDC    LABS/STUDIES  --------------------------------------------------------------------------------              8.6    7.41  >-----------<  194      [04-15-21 @ 04:45]              28.2     143  |  105  |  45  ----------------------------<  241      [04-15-21 @ 04:45]  4.0   |  24  |  3.8        Ca     8.3     [04-15-21 @ 04:45]      Mg     2.1     [04-15-21 @ 04:45]    TPro  5.1  /  Alb  2.4  /  TBili  0.2  /  DBili  x   /  AST  14  /  ALT  <5  /  AlkPhos  190  [04-15-21 @ 04:45]    PT/INR: PT 20.60, INR 1.79       [04-15-21 @ 04:45]  PTT: 75.0       [04-15-21 @ 04:45]      Creatinine Trend:  SCr 3.8 [04-15 @ 04:45]  SCr 3.6 [04-14 @ 05:15]  SCr 4.2 [04-13 @ 04:40]  SCr 3.7 [04-12 @ 05:08]  SCr 3.1 [04-11 @ 04:30]    Urinalysis - [04-02-21 @ 10:44]      Color Yellow / Appearance Turbid / SG 1.027 / pH 7.0      Gluc Negative / Ketone Negative  / Bili Negative / Urobili <2 mg/dL       Blood Moderate / Protein 100 mg/dL / Leuk Est Large / Nitrite Negative      RBC 14 / WBC >720 / Hyaline 16 / Gran  / Sq Epi  / Non Sq Epi 3 / Bacteria Moderate      Iron 55, TIBC 167, %sat 33      [05-27-20 @ 13:22]  Ferritin 636      [05-27-20 @ 13:22]  HbA1c 5.7      [11-09-19 @ 01:20]  TSH 1.71      [04-06-21 @ 04:40]

## 2021-04-15 NOTE — CHART NOTE - NSCHARTNOTEFT_GEN_A_CORE
PACU ANESTHESIA ADMISSION NOTE      Procedure: Implantation of biventricular defibrillator    Intramedullary rodding of fracture of right femur      Post op diagnosis:  Afib    Cardiomyopathy    Personal history of sudden cardiac death (SCD) resuscitated    Hip fracture, right        __x__  Intubated  TV:_350_____       Rate: _12_____      FiO2: __40%____        Vitals:            T:   37             BP :  139/81              R:    12          Sat:  100%             P: 87      Mental Status:  ___ Awake   _____ Alert   _____ Drowsy   __x___ Sedated    Nausea/Vomiting:  _x___  NO       ______Yes,   See Post - Op Orders         Pain Scale (0-10):  __0___    Treatment:  None    __x__ See Post - Op/PCA Orders    Post - Operative Fluids:   __ Oral   __x__ See Post - Op Orders    Plan: Discharge:   _Home       _____Floor     __x___Critical Care    CCU_____  Other:_________________    Comments:  No anesthesia issues or complications noted. 1 unit of PRBC transfused intra-op.   Transported to CCU with cardiac monitors + O2 via Ambu-bag to TT. Report given to CCU team

## 2021-04-15 NOTE — PROGRESS NOTE ADULT - ASSESSMENT
Impression:  ARF   cardiac arrest unknown etiology   arrythmia V tach ./ afib s/p AICD   multiple fx , hip, humerus , neck   shock ?? cardiogenic   seizure   HD   left arm dvt     PLAN:    CNS: keppra on precedex and fentanyl   follow neuro sx for the collar neck fx     HEENT: oral care     PULMONARY: keep same vent setting will do weaning  post ORIF   from pulmonary she is on minimal vent setting  she is stable for surgery   follow cardiology eval     CARDIOVASCULAR: follow EP / ct surgery   cardiology follow up     GI: GI prophylaxis. NPO for OR      RENAL: HD at bed side     INFECTIOUS DISEASE: off abx   repeat bld   procal     HEMATOLOGICAL:  DVT prophylaxis. follow h/h  off heparin for OR     ENDOCRINE:  Follow up FS.  Insulin protocol if needed.    for ORIF today i spoke to orthopedics on schedule     burn to evaluate the neck ulceration from the collar

## 2021-04-15 NOTE — PROGRESS NOTE ADULT - SUBJECTIVE AND OBJECTIVE BOX
SUBJECTIVE:    Patient is a 70y old Female who presents with a chief complaint of s/p mechanical fall (13 Apr 2021 15:03)    Currently admitted to medicine with the primary diagnosis of Cardiopulmonary arrest    Today is hospital day 14d. remains intubated and mechanically ventilated    PAST MEDICAL & SURGICAL HISTORY  Diabetes    Congestive heart failure (CHF)    Pleural effusion due to congestive heart failure    End stage renal disease  on dialysis Mon, Wed, Fr    Uterine prolapse    Chronic indwelling Castellon catheter    History of repair of hip fracture  2019    S/P CABG (coronary artery bypass graft)  11/2019    History of thoracentesis    Chronic indwelling Castellon catheter      SOCIAL HISTORY:  Negative for smoking/alcohol/drug use.     ALLERGIES:  No Known Allergies    MEDICATIONS:  STANDING MEDICATIONS  aMIOdarone    Tablet 200 milliGRAM(s) Oral two times a day  aspirin  chewable 81 milliGRAM(s) Oral daily  atorvastatin Oral Tab/Cap - Peds 40 milliGRAM(s) Oral daily  BACItracin   Ointment 1 Application(s) Topical every 12 hours  chlorhexidine 0.12% Liquid 15 milliLiter(s) Oral Mucosa every 12 hours  chlorhexidine 4% Liquid 1 Application(s) Topical <User Schedule>  dexMEDEtomidine Infusion 0.2 MICROgram(s)/kG/Hr IV Continuous <Continuous>  doxercalciferol Injectable 2 MICROGram(s) IV Push <User Schedule>  epoetin mana-epbx (RETACRIT) Injectable 42540 Unit(s) IV Push <User Schedule>  fentaNYL   Infusion. 0.5 MICROgram(s)/kG/Hr IV Continuous <Continuous>  heparin  Infusion 1100 Unit(s)/Hr IV Continuous <Continuous>  lactulose Syrup 15 Gram(s) Oral three times a day  levETIRAcetam  IVPB 375 milliGRAM(s) IV Intermittent every 12 hours  levETIRAcetam  IVPB 250 milliGRAM(s) IV Intermittent <User Schedule>  pantoprazole  Injectable 40 milliGRAM(s) IV Push daily  phenylephrine    Infusion 0.1 MICROgram(s)/kG/Min IV Continuous <Continuous>  polyethylene glycol 3350 17 Gram(s) Oral at bedtime  senna 2 Tablet(s) Oral at bedtime  valproic  acid Syrup 250 milliGRAM(s) Oral two times a day    PRN MEDICATIONS    VITALS:   T(F): 99.4  HR: 94  BP: --  RR: 18  SpO2: 100%    LABS:                        8.6    7.41  )-----------( 194      ( 15 Apr 2021 04:45 )             28.2     04-15    143  |  105  |  45<H>  ----------------------------<  241<H>  4.0   |  24  |  3.8<H>    Ca    8.3<L>      15 Apr 2021 04:45  Mg     2.1     04-15    TPro  5.1<L>  /  Alb  2.4<L>  /  TBili  0.2  /  DBili  x   /  AST  14  /  ALT  <5  /  AlkPhos  190<H>  04-15    PT/INR - ( 15 Apr 2021 04:45 )   PT: 20.60 sec;   INR: 1.79 ratio         PTT - ( 15 Apr 2021 04:45 )  PTT:75.0 sec    ABG - ( 15 Apr 2021 03:21 )  pH, Arterial: 7.38  pH, Blood: x     /  pCO2: 46    /  pO2: 150   / HCO3: 27    / Base Excess: 1.2   /  SaO2: 100         Culture - Blood (collected 12 Apr 2021 11:00)  Source: .Blood Blood-Venous  Preliminary Report (13 Apr 2021 22:01):    No growth to date.      RADIOLOGY:    PHYSICAL EXAM:  GEN: No acute distress  LUNGS: bilateral ronchi  HEART: S1/S2 present. irregular.   ABD: Soft, non-tender, non-distended. Bowel sounds present  EXT: NC/NC/NE/2+PP/GEORGE  NEURO: AAOX3

## 2021-04-15 NOTE — PROGRESS NOTE ADULT - SUBJECTIVE AND OBJECTIVE BOX
HPI  Patient is a 70y old Female who presents with a chief complaint of s/p mechanical fall (13 Apr 2021 15:03)    Currently admitted to medicine with the primary diagnosis of Cardiopulmonary arrest       Today is hospital day 14d.     INTERVAL HPI / OVERNIGHT EVENTS:  Patient was examined and seen at bedside. Patient is sedated and intubated. No overnight events.    ROS: Otherwise unremarkable     PAST MEDICAL & SURGICAL HISTORY  Diabetes    Congestive heart failure (CHF)    Pleural effusion due to congestive heart failure    End stage renal disease  on dialysis Mon, Wed, Fr    Uterine prolapse    Chronic indwelling Castellon catheter    History of repair of hip fracture  2019    S/P CABG (coronary artery bypass graft)  11/2019    History of thoracentesis    Chronic indwelling Castellon catheter      ALLERGIES  No Known Allergies    MEDICATIONS  STANDING MEDICATIONS  aMIOdarone    Tablet 200 milliGRAM(s) Oral two times a day  aspirin  chewable 81 milliGRAM(s) Oral daily  atorvastatin Oral Tab/Cap - Peds 40 milliGRAM(s) Oral daily  BACItracin   Ointment 1 Application(s) Topical every 12 hours  chlorhexidine 0.12% Liquid 15 milliLiter(s) Oral Mucosa every 12 hours  chlorhexidine 4% Liquid 1 Application(s) Topical <User Schedule>  dexMEDEtomidine Infusion 0.2 MICROgram(s)/kG/Hr IV Continuous <Continuous>  doxercalciferol Injectable 2 MICROGram(s) IV Push <User Schedule>  epoetin mana-epbx (RETACRIT) Injectable 77836 Unit(s) IV Push <User Schedule>  fentaNYL   Infusion. 0.5 MICROgram(s)/kG/Hr IV Continuous <Continuous>  heparin  Infusion 1100 Unit(s)/Hr IV Continuous <Continuous>  lactulose Syrup 15 Gram(s) Oral three times a day  levETIRAcetam  IVPB 375 milliGRAM(s) IV Intermittent every 12 hours  levETIRAcetam  IVPB 250 milliGRAM(s) IV Intermittent <User Schedule>  pantoprazole  Injectable 40 milliGRAM(s) IV Push daily  phenylephrine    Infusion 0.1 MICROgram(s)/kG/Min IV Continuous <Continuous>  polyethylene glycol 3350 17 Gram(s) Oral at bedtime  senna 2 Tablet(s) Oral at bedtime  valproic  acid Syrup 250 milliGRAM(s) Oral two times a day    PRN MEDICATIONS    VITALS:  T(F): 99.4  HR: 86  BP: --  RR: 17  SpO2: 100%    PHYSICAL EXAM  GEN: NAD, Resting comfortably in bed  PULM: Clear to auscultation bilaterally, No wheezes  CVS: Regular rate and rhythm, S1-S2, no murmurs  ABD: Soft, non-tender, non-distended, no guarding  EXT: No edema  Neuro: intubated and sedated     LABS                        8.6    7.41  )-----------( 194      ( 15 Apr 2021 04:45 )             28.2     04-15    143  |  105  |  45<H>  ----------------------------<  241<H>  4.0   |  24  |  3.8<H>    Ca    8.3<L>      15 Apr 2021 04:45  Mg     2.1     04-15    TPro  5.1<L>  /  Alb  2.4<L>  /  TBili  0.2  /  DBili  x   /  AST  14  /  ALT  <5  /  AlkPhos  190<H>  04-15    PT/INR - ( 15 Apr 2021 04:45 )   PT: 20.60 sec;   INR: 1.79 ratio         PTT - ( 15 Apr 2021 04:45 )  PTT:75.0 sec    ABG - ( 15 Apr 2021 03:21 )  pH, Arterial: 7.38  pH, Blood: x     /  pCO2: 46    /  pO2: 150   / HCO3: 27    / Base Excess: 1.2   /  SaO2: 100         Culture - Blood (collected 12 Apr 2021 11:00)  Source: .Blood Blood-Venous  Preliminary Report (13 Apr 2021 22:01):    No growth to date.      RADIOLOGY        Assessment and Plan:   · Assessment	  69 y/o F with a pmhx of CAD s/p CABG (Nov 2019), HFrEF (EF 20%), AS, ESRD (MWF), bladder prolapse requiring chronic Castellon, Diabetes Type 2 on insulin presents from home s/p mechanical fall. Trauma workup in ED significant for R humeral fracture / R hip fracture / non displaced odontoid fracture/ acute rib fractures/ ? T6 vertebral fracture. In the ED patient arrested, ROSC achieved after 2 minutes of CPR. EKG at the time showed RBBB with wide complex tachy. Pt subsequently intubated and sedated, started on pressors. Pt Experienced seizure-like episode during weaning trial s/p 2mg Versed.  (4/2/21) Weaning trial passed. Patient was Extubated . 02 sat began to decrease to low 80s, tachypenic and cyanotic started on bipap with no improvement. Patient again became hypotensive and tachy. Went into Afib. s/p cardioversion x3 and started on amio     # RT Humerus/Rt femur fracture/Rt Hip fracture   - Ortho following  - Pain control. Currently on Fentanyl drip   - Patient taken to the OR 4/8. Procedure cancelled because patient went into SVT, s/p adenosine. Started on amio at the time   - Procedure cancelled 4/14  - Plan for ORIF of Right femur today. Holding Heparin. NPO since midnight       #Cardiac arrest in the setting of arythmia and CHF exacerbation   - s/p LHC (4/9) - Patent LIMA and SVG grafts, Transaortic gradient ~ 10-15mmHg  - Trops .22-->.24 -->.21-->.34 (4/4/21)  - CTA negative for PE   - Lower extremity duplex negative for DVT( 4/6/21)  - s/p BIV ICD placement. EP interrogation no events   - Patient is NPO for procedure   - Holding heparin     #LUE DVT  - UE duplex : Thrombus noted in the left brachial vein.  - Started on Heparin   - Holding heparin today for ORIF today    # Seizure unknown etiology  - CT Head (4/1) negative   - Keppra 500mg adminstered. followed by 375mg BID with additional 250mg dose after dialysis  - Started on Depakote 250mg PO BID   - VEEG - bifrontal sharps but no seizure activity. Consistent with metabolic process.     # Aspiration Pneumonia vs Adenovirus Pneumonia   - CXR : Stable bilateral opacities. No pneumothorax. Support devices, in satisfactory position.  - s/p Completion of ceftriaxone       # Perirectal abscess  - surgery is following patient. Dr Mota   - s/p debridement over a month. No need for packing as per surgery     #Odontoid fracture   - Neurosurgery following patient   - Want to c/w collar      #ESRD  - Nephro following patient  - HD tuesday 4/13    # h/o DM   - fingersticks tid  - hold home insulin  - insulin protocol if > 180    # DVT PPX: heparin subq  # GI PPX: PPI  # Diet: NPO   # Activity: bedrest  # Bowel regimen       HPI  Patient is a 70y old Female who presents with a chief complaint of s/p mechanical fall (13 Apr 2021 15:03)    Currently admitted to medicine with the primary diagnosis of Cardiopulmonary arrest       Today is hospital day 14d.     INTERVAL HPI / OVERNIGHT EVENTS:  Patient was examined and seen at bedside. Patient is sedated and intubated. No overnight events.    ROS: Otherwise unremarkable     PAST MEDICAL & SURGICAL HISTORY  Diabetes    Congestive heart failure (CHF)    Pleural effusion due to congestive heart failure    End stage renal disease  on dialysis Mon, Wed, Fr    Uterine prolapse    Chronic indwelling Castellon catheter    History of repair of hip fracture  2019    S/P CABG (coronary artery bypass graft)  11/2019    History of thoracentesis    Chronic indwelling Castellon catheter      ALLERGIES  No Known Allergies    MEDICATIONS  STANDING MEDICATIONS  aMIOdarone    Tablet 200 milliGRAM(s) Oral two times a day  aspirin  chewable 81 milliGRAM(s) Oral daily  atorvastatin Oral Tab/Cap - Peds 40 milliGRAM(s) Oral daily  BACItracin   Ointment 1 Application(s) Topical every 12 hours  chlorhexidine 0.12% Liquid 15 milliLiter(s) Oral Mucosa every 12 hours  chlorhexidine 4% Liquid 1 Application(s) Topical <User Schedule>  dexMEDEtomidine Infusion 0.2 MICROgram(s)/kG/Hr IV Continuous <Continuous>  doxercalciferol Injectable 2 MICROGram(s) IV Push <User Schedule>  epoetin mana-epbx (RETACRIT) Injectable 82539 Unit(s) IV Push <User Schedule>  fentaNYL   Infusion. 0.5 MICROgram(s)/kG/Hr IV Continuous <Continuous>  heparin  Infusion 1100 Unit(s)/Hr IV Continuous <Continuous>  lactulose Syrup 15 Gram(s) Oral three times a day  levETIRAcetam  IVPB 375 milliGRAM(s) IV Intermittent every 12 hours  levETIRAcetam  IVPB 250 milliGRAM(s) IV Intermittent <User Schedule>  pantoprazole  Injectable 40 milliGRAM(s) IV Push daily  phenylephrine    Infusion 0.1 MICROgram(s)/kG/Min IV Continuous <Continuous>  polyethylene glycol 3350 17 Gram(s) Oral at bedtime  senna 2 Tablet(s) Oral at bedtime  valproic  acid Syrup 250 milliGRAM(s) Oral two times a day    PRN MEDICATIONS    VITALS:  T(F): 99.4  HR: 86  BP: --  RR: 17  SpO2: 100%    PHYSICAL EXAM  GEN: NAD, Resting comfortably in bed  PULM: Clear to auscultation bilaterally, No wheezes  CVS: Regular rate and rhythm, S1-S2, no murmurs  ABD: Soft, non-tender, non-distended, no guarding  EXT: No edema  Neuro: intubated and sedated     LABS                        8.6    7.41  )-----------( 194      ( 15 Apr 2021 04:45 )             28.2     04-15    143  |  105  |  45<H>  ----------------------------<  241<H>  4.0   |  24  |  3.8<H>    Ca    8.3<L>      15 Apr 2021 04:45  Mg     2.1     04-15    TPro  5.1<L>  /  Alb  2.4<L>  /  TBili  0.2  /  DBili  x   /  AST  14  /  ALT  <5  /  AlkPhos  190<H>  04-15    PT/INR - ( 15 Apr 2021 04:45 )   PT: 20.60 sec;   INR: 1.79 ratio         PTT - ( 15 Apr 2021 04:45 )  PTT:75.0 sec    ABG - ( 15 Apr 2021 03:21 )  pH, Arterial: 7.38  pH, Blood: x     /  pCO2: 46    /  pO2: 150   / HCO3: 27    / Base Excess: 1.2   /  SaO2: 100         Culture - Blood (collected 12 Apr 2021 11:00)  Source: .Blood Blood-Venous  Preliminary Report (13 Apr 2021 22:01):    No growth to date.      RADIOLOGY        Assessment and Plan:   · Assessment	  71 y/o F with a pmhx of CAD s/p CABG (Nov 2019), HFrEF (EF 20%), AS, ESRD (MWF), bladder prolapse requiring chronic Castellon, Diabetes Type 2 on insulin presents from home s/p mechanical fall. Trauma workup in ED significant for R humeral fracture / R hip fracture / non displaced odontoid fracture/ acute rib fractures/ ? T6 vertebral fracture. In the ED patient arrested, ROSC achieved after 2 minutes of CPR. EKG at the time showed RBBB with wide complex tachy. Pt subsequently intubated and sedated, started on pressors. Pt Experienced seizure-like episode during weaning trial s/p 2mg Versed.  (4/2/21) Weaning trial passed. Patient was Extubated . 02 sat began to decrease to low 80s, tachypenic and cyanotic started on bipap with no improvement. Patient again became hypotensive and tachy. Went into Afib. s/p cardioversion x3 and started on amio     # RT Humerus/Rt femur fracture/Rt Hip fracture   - Ortho following  - Pain control. Currently on Fentanyl drip   - Patient taken to the OR 4/8. Procedure cancelled because patient went into SVT, s/p adenosine. Started on amio at the time   - Procedure cancelled 4/14  - Plan for ORIF of Right femur today. Holding Heparing at 10. Ortho will take patient to OR this afternoon.  - NPO since midnight       #Cardiac arrest in the setting of arythmia and CHF exacerbation   - s/p LHC (4/9) - Patent LIMA and SVG grafts, Transaortic gradient ~ 10-15mmHg  - Trops .22-->.24 -->.21-->.34 (4/4/21)  - CTA negative for PE   - Lower extremity duplex negative for DVT( 4/6/21)  - s/p BIV ICD placement. EP interrogation no events   - Patient is NPO for procedure   - Holding heparin     #LUE DVT  - UE duplex : Thrombus noted in the left brachial vein.  - Started on Heparin   - Holding heparin today for ORIF today    # Seizure unknown etiology  - CT Head (4/1) negative   - Keppra 500mg adminstered. followed by 375mg BID with additional 250mg dose after dialysis  - Started on Depakote 250mg PO BID   - VEEG - bifrontal sharps but no seizure activity. Consistent with metabolic process.     # Aspiration Pneumonia vs Adenovirus Pneumonia   - CXR : Stable bilateral opacities. No pneumothorax. Support devices, in satisfactory position.  - s/p Completion of ceftriaxone       # Perirectal abscess  - surgery is following patient. Dr Mota   - s/p debridement over a month. No need for packing as per surgery     #Odontoid fracture   - Neurosurgery following patient   - Want to c/w collar      #ESRD  - Nephro following patient  - HD tuesday 4/13    # h/o DM   - fingersticks tid  - hold home insulin  - insulin protocol if > 180    # DVT PPX: heparin subq  # GI PPX: PPI  # Diet: NPO   # Activity: bedrest  # Bowel regimen

## 2021-04-15 NOTE — PROGRESS NOTE ADULT - SUBJECTIVE AND OBJECTIVE BOX
Patient is a 70y old  Female who presents with a chief complaint of s/p mechanical fall (13 Apr 2021 15:03)      Over Night Events:  Patient seen and examined.   pending ORIF today on heparin drip   still vented on HD     ROS:  See HPI    PHYSICAL EXAM    ICU Vital Signs Last 24 Hrs  T(C): 37.4 (15 Apr 2021 08:00), Max: 37.4 (15 Apr 2021 04:00)  T(F): 99.4 (15 Apr 2021 08:00), Max: 99.4 (15 Apr 2021 08:00)  HR: 82 (15 Apr 2021 08:00) (74 - 120)  BP: --  BP(mean): --  ABP: 92/46 (15 Apr 2021 08:00) (86/50 - 132/72)  ABP(mean): 62 (15 Apr 2021 08:00) (62 - 94)  RR: 12 (15 Apr 2021 08:00) (12 - 18)  SpO2: 100% (15 Apr 2021 08:00) (99% - 100%)      General: on sedation   HEENT:    et tube             Lymph Nodes: NO cervical LN   Lungs: Bilateral BS  Cardiovascular: Regular   Abdomen: Soft, Positive BS  Extremities: No clubbing   Skin: warm   Neurological:  no focal   Musculoskeletal: move all ext     I&O's Detail    14 Apr 2021 07:01  -  15 Apr 2021 07:00  --------------------------------------------------------  IN:    Dexmedetomidine: 212.9 mL    Enteral Tube Flush: 140 mL    FentaNYL: 208.8 mL    Heparin: 108 mL    Heparin Infusion: 45 mL    IV PiggyBack: 200 mL    Nepro: 220 mL    Phenylephrine: 46 mL  Total IN: 1180.7 mL    OUT:    Indwelling Catheter - Urethral (mL): 5 mL  Total OUT: 5 mL    Total NET: 1175.7 mL      15 Apr 2021 07:01  -  15 Apr 2021 08:29  --------------------------------------------------------  IN:    Dexmedetomidine: 7.3 mL    FentaNYL: 8.7 mL    Heparin: 12 mL    Phenylephrine: 1.6 mL  Total IN: 29.6 mL    OUT:    Indwelling Catheter - Urethral (mL): 2 mL  Total OUT: 2 mL    Total NET: 27.6 mL          LABS:                          8.6    7.41  )-----------( 194      ( 15 Apr 2021 04:45 )             28.2         15 Apr 2021 04:45    143    |  105    |  45     ----------------------------<  241    4.0     |  24     |  3.8      Ca    8.3        15 Apr 2021 04:45  Mg     2.1       15 Apr 2021 04:45    TPro  5.1    /  Alb  2.4    /  TBili  0.2    /  DBili  x      /  AST  14     /  ALT  <5     /  AlkPhos  190    15 Apr 2021 04:45  Amylase x     lipase x                                                 PT/INR - ( 15 Apr 2021 04:45 )   PT: 20.60 sec;   INR: 1.79 ratio         PTT - ( 15 Apr 2021 04:45 )  PTT:75.0 sec                                                                                                   Culture - Blood (collected 12 Apr 2021 11:00)  Source: .Blood Blood-Venous  Preliminary Report (13 Apr 2021 22:01):    No growth to date.                                                   Mode: AC/ CMV (Assist Control/ Continuous Mandatory Ventilation)  RR (machine): 12  TV (machine): 350  FiO2: 30  PEEP: 5  ITime: 1  MAP: 8  PIP: 21                                      ABG - ( 15 Apr 2021 03:21 )  pH, Arterial: 7.38  pH, Blood: x     /  pCO2: 46    /  pO2: 150   / HCO3: 27    / Base Excess: 1.2   /  SaO2: 100                 MEDICATIONS  (STANDING):  aMIOdarone    Tablet 200 milliGRAM(s) Oral two times a day  aspirin  chewable 81 milliGRAM(s) Oral daily  atorvastatin Oral Tab/Cap - Peds 40 milliGRAM(s) Oral daily  BACItracin   Ointment 1 Application(s) Topical every 12 hours  chlorhexidine 0.12% Liquid 15 milliLiter(s) Oral Mucosa every 12 hours  chlorhexidine 4% Liquid 1 Application(s) Topical <User Schedule>  dexMEDEtomidine Infusion 0.2 MICROgram(s)/kG/Hr (3.3 mL/Hr) IV Continuous <Continuous>  doxercalciferol Injectable 2 MICROGram(s) IV Push <User Schedule>  epoetin mana-epbx (RETACRIT) Injectable 88165 Unit(s) IV Push <User Schedule>  fentaNYL   Infusion. 0.5 MICROgram(s)/kG/Hr (2.9 mL/Hr) IV Continuous <Continuous>  heparin  Infusion 1100 Unit(s)/Hr (12 mL/Hr) IV Continuous <Continuous>  lactulose Syrup 15 Gram(s) Oral three times a day  levETIRAcetam  IVPB 375 milliGRAM(s) IV Intermittent every 12 hours  levETIRAcetam  IVPB 250 milliGRAM(s) IV Intermittent <User Schedule>  pantoprazole  Injectable 40 milliGRAM(s) IV Push daily  phenylephrine    Infusion 0.1 MICROgram(s)/kG/Min (1.09 mL/Hr) IV Continuous <Continuous>  polyethylene glycol 3350 17 Gram(s) Oral at bedtime  senna 2 Tablet(s) Oral at bedtime  valproic  acid Syrup 250 milliGRAM(s) Oral two times a day    MEDICATIONS  (PRN):          Xrays:  TLC:  OG:  ET tube:                                                                                    stable left small effusion    ECHO:  [facility] ICU:

## 2021-04-16 NOTE — PROGRESS NOTE ADULT - ATTENDING COMMENTS
Patient remains intubated, sedated. S/p ORIF yesterday. She failed SBP trial.     Continue mechanical ventilation - PCCM managing   Would recommend starting discussion about trach   Remove TLC   Continue amiodarone   EP follow up

## 2021-04-16 NOTE — PROGRESS NOTE ADULT - ASSESSMENT
IMPRESSION:  - Cardiac arrest in the setting of arrythmia? s/p cath 4/9/21 triple vessel disease with open grafts  - RT Humerus/Rt femur fracture/Rt Hip fracture - s/p hip surgery 4/15/21  - AF with RVR - currently rate controlled  - Seizure unknown etiology  - Aspiration Pneumonia vs Adenovirus Pneumonia  - ESRD  - Odontoid fracture  - h/o DM     PLAN:    CNS:   - Spontaneous awakening trial    HEENT:  - Oral care    PULMONARY:   - HOB @ 45 degrees.  Vent management per pulm/crit    CARDIOVASCULAR:  - s/p cath: patent grafts - no significant aortic valve gradient  - TTE noted: EF 38% - severe AS based on echo (but no gradient in cath)  - pAF on amiodarone - continue amiodarone  - Heparin drip for AF  - Will need betablockers also once stable  - s/p AICD for secondary prevention 4/12/21  - F/U EP    GI:   - GI prophylaxis.  Feeding     RENAL:    - Follow up lytes.  Correct as needed  - HD as per renal    INFECTIOUS DISEASE:   - Follow up cultures  - ABx per critical care team    HEMATOLOGICAL:    - DVT prophylaxis - on heparin drip    ENDOCRINE:    - Follow up FS.  Insulin protocol if needed.    MUSCULOSKELETAL:  - s/p hip surgery 4/15/21    DISPO: ICU

## 2021-04-16 NOTE — PROGRESS NOTE ADULT - ASSESSMENT
Impression:  ARF   cardiac arrest unknown etiology   arrythmia V tach ./ afib s/p AICD   multiple fx , hip, humerus , neck   shock ?? cardiogenic   seizure   HD   left arm dvt     PLAN:    CNS: keppra on precedex hold  fentanyl for weaning trial   follow neuro sx for the collar neck fx     HEENT: oral care     PULMONARY:  do weaning trail when more awake       CARDIOVASCULAR: follow EP / ct surgery   cardiology follow up     GI: GI prophylaxis. if failed weaning trial start feed      RENAL: HD as per renal     INFECTIOUS DISEASE: off abx   repeat bld   procal     HEMATOLOGICAL:  DVT prophylaxis. follow h/h  on heparin R     ENDOCRINE:  Follow up FS.  Insulin protocol if needed.  follow orthopedics     burn to evaluate the neck ulceration from the collar keep tlc for today has left IJ , jonathan if still need tlc will place femoral   because can not place on right because on neck fracture   has teseo and new AICD will assess jonatahn

## 2021-04-16 NOTE — PROGRESS NOTE ADULT - SUBJECTIVE AND OBJECTIVE BOX
Orthopaedics POC Note    GUS WILBURN  [id number]    Patient is a 70y year old Female with right hip fracture  POD#1 from R hip ORIF  Patient seen and examined bedside  She is intubated and sedated in the CCU    T(C): 37.7 (04-16-21 @ 00:00), Max: 37.7 (04-15-21 @ 16:00)  HR: 80 (04-16-21 @ 00:00) (73 - 120)  BP: --  RR: 13 (04-16-21 @ 00:00) (5 - 21)  SpO2: 100% (04-16-21 @ 00:00) (99% - 100%)    Physical Exam  NAD  ET tube in place, sedated  Resting comfortably    RLE  Prevena dressing intact with good seal  Distal aquacel in place, clean  Motor: TA/EHL/Gastroc intact  Sensory: SP/DP/Lora/Sa intact  Vasc: foot WWP, 2+ DP pulse    Labs                        9.3    9.70  )-----------( 248      ( 15 Apr 2021 15:55 )             29.5     04-15    143  |  105  |  45<H>  ----------------------------<  241<H>  4.0   |  24  |  3.8<H>    Ca    8.3<L>      15 Apr 2021 04:45  Mg     2.1     04-15    TPro  5.1<L>  /  Alb  2.4<L>  /  TBili  0.2  /  DBili  x   /  AST  14  /  ALT  <5  /  AlkPhos  190<H>  04-15    LIVER FUNCTIONS - ( 15 Apr 2021 04:45 )  Alb: 2.4 g/dL / Pro: 5.1 g/dL / ALK PHOS: 190 U/L / ALT: <5 U/L / AST: 14 U/L / GGT: x           PT/INR - ( 15 Apr 2021 15:55 )   PT: 20.10 sec;   INR: 1.75 ratio         PTT - ( 15 Apr 2021 15:55 )  PTT:44.6 sec    A/P: Patient is a 70y year old Female as above    WBAT on RLE  DVT ppx: SCD, hep gtt  Abx: ancef q8hrs for 3 doses post-OP  Pain control  Home medications: resume  Trend labs  Dispo: Pending PT recommendations

## 2021-04-16 NOTE — CONSULT NOTE ADULT - SUBJECTIVE AND OBJECTIVE BOX
Patient is a 70y old  Female who presents with a chief complaint of s/p mechanical fall. burn team consulted for evaluation of pressure ulcers to the neck/face and shoulder.       INTERVAL HPI/OVERNIGHT EVENTS:  ICU Vital Signs Last 24 Hrs  T(C): 37.2 (16 Apr 2021 12:00), Max: 37.7 (15 Apr 2021 19:07)  T(F): 99 (16 Apr 2021 12:00), Max: 99.8 (16 Apr 2021 00:00)  HR: 120 (16 Apr 2021 16:20) (80 - 120)  ABP: 116/66 (16 Apr 2021 16:00) (76/44 - 130/62)  ABP(mean): 86 (16 Apr 2021 16:00) (56 - 92)  RR: 18 (16 Apr 2021 16:00) (5 - 21)  SpO2: 100% (16 Apr 2021 16:20) (98% - 100%)      MEDICATIONS  (STANDING):  aMIOdarone    Tablet 200 milliGRAM(s) Oral two times a day  aspirin  chewable 81 milliGRAM(s) Oral daily  atorvastatin 40 milliGRAM(s) Oral at bedtime  BACItracin   Ointment 1 Application(s) Topical every 12 hours  chlorhexidine 0.12% Liquid 15 milliLiter(s) Oral Mucosa every 12 hours  collagenase Ointment 1 Application(s) Topical two times a day  dexMEDEtomidine Infusion 0.228 MICROgram(s)/kG/Hr (3.3 mL/Hr) IV Continuous <Continuous>  doxercalciferol Injectable 2 MICROGram(s) IV Push <User Schedule>  epoetin mana-epbx (RETACRIT) Injectable 69582 Unit(s) IV Push <User Schedule>  heparin  Infusion 1200 Unit(s)/Hr (12 mL/Hr) IV Continuous <Continuous>  lactulose Syrup 15 Gram(s) Oral three times a day  levETIRAcetam  IVPB 375 milliGRAM(s) IV Intermittent every 12 hours  levETIRAcetam  IVPB 250 milliGRAM(s) IV Intermittent <User Schedule>  pantoprazole  Injectable 40 milliGRAM(s) IV Push daily  phenylephrine    Infusion 0.1 MICROgram(s)/kG/Min (1.09 mL/Hr) IV Continuous <Continuous>  polyethylene glycol 3350 17 Gram(s) Oral at bedtime  senna 2 Tablet(s) Oral at bedtime  sertraline 50 milliGRAM(s) Oral daily  valproic  acid Syrup 250 milliGRAM(s) Oral two times a day    PHYSICAL EXAM:  GENERAL: well built, well nourished  Wound: ~[id number] wound to chin, black eschar noted to wound no purulent drainage or foul odor noted.   to left neck/clavicle: ~[id number] wound, black eschar noted to wound, no purulent drainage or foul odor noted.          Patient is a 70y old  Female who presents with a chief complaint of s/p mechanical fall. burn team consulted for evaluation of pressure ulcers to the neck/face and shoulder.       INTERVAL HPI/OVERNIGHT EVENTS:  ICU Vital Signs Last 24 Hrs  T(C): 37.2 (16 Apr 2021 12:00), Max: 37.7 (15 Apr 2021 19:07)  T(F): 99 (16 Apr 2021 12:00), Max: 99.8 (16 Apr 2021 00:00)  HR: 120 (16 Apr 2021 16:20) (80 - 120)  ABP: 116/66 (16 Apr 2021 16:00) (76/44 - 130/62)  ABP(mean): 86 (16 Apr 2021 16:00) (56 - 92)  RR: 18 (16 Apr 2021 16:00) (5 - 21)  SpO2: 100% (16 Apr 2021 16:20) (98% - 100%)      MEDICATIONS  (STANDING):  aMIOdarone    Tablet 200 milliGRAM(s) Oral two times a day  aspirin  chewable 81 milliGRAM(s) Oral daily  atorvastatin 40 milliGRAM(s) Oral at bedtime  BACItracin   Ointment 1 Application(s) Topical every 12 hours  chlorhexidine 0.12% Liquid 15 milliLiter(s) Oral Mucosa every 12 hours  collagenase Ointment 1 Application(s) Topical two times a day  dexMEDEtomidine Infusion 0.228 MICROgram(s)/kG/Hr (3.3 mL/Hr) IV Continuous <Continuous>  doxercalciferol Injectable 2 MICROGram(s) IV Push <User Schedule>  epoetin mana-epbx (RETACRIT) Injectable 03835 Unit(s) IV Push <User Schedule>  heparin  Infusion 1200 Unit(s)/Hr (12 mL/Hr) IV Continuous <Continuous>  lactulose Syrup 15 Gram(s) Oral three times a day  levETIRAcetam  IVPB 375 milliGRAM(s) IV Intermittent every 12 hours  levETIRAcetam  IVPB 250 milliGRAM(s) IV Intermittent <User Schedule>  pantoprazole  Injectable 40 milliGRAM(s) IV Push daily  phenylephrine    Infusion 0.1 MICROgram(s)/kG/Min (1.09 mL/Hr) IV Continuous <Continuous>  polyethylene glycol 3350 17 Gram(s) Oral at bedtime  senna 2 Tablet(s) Oral at bedtime  sertraline 50 milliGRAM(s) Oral daily  valproic  acid Syrup 250 milliGRAM(s) Oral two times a day    PHYSICAL EXAM:  GENERAL: well built, well nourished  Wound: ~4cm x 7 cm wound  to chin, black eschar noted to wound no purulent drainage or foul odor noted.   to left neck/clavicle: ~[id number] wound, black eschar noted to wound, no purulent drainage or foul odor noted.

## 2021-04-16 NOTE — PROGRESS NOTE ADULT - SUBJECTIVE AND OBJECTIVE BOX
SUBJECTIVE:    Patient is a 70y old Female who presents with a chief complaint of s/p mechanical fall (13 Apr 2021 15:03)    Currently admitted to medicine with the primary diagnosis of Cardiopulmonary arrest. s/p hip surgery 4/15/21.     Today is hospital day 15d. This morning she remains intubated. no events    PAST MEDICAL & SURGICAL HISTORY  Diabetes    Congestive heart failure (CHF)    Pleural effusion due to congestive heart failure    End stage renal disease  on dialysis Mon, Wed, Fr    Uterine prolapse    Chronic indwelling Castellon catheter    History of repair of hip fracture  2019    S/P CABG (coronary artery bypass graft)  11/2019    History of thoracentesis    Chronic indwelling Castellon catheter      SOCIAL HISTORY:  Negative for smoking/alcohol/drug use.     ALLERGIES:  No Known Allergies    MEDICATIONS:  STANDING MEDICATIONS  aMIOdarone    Tablet 200 milliGRAM(s) Oral two times a day  aspirin  chewable 81 milliGRAM(s) Oral daily  atorvastatin 40 milliGRAM(s) Oral at bedtime  BACItracin   Ointment 1 Application(s) Topical every 12 hours  ceFAZolin   IVPB 1000 milliGRAM(s) IV Intermittent every 8 hours  chlorhexidine 0.12% Liquid 15 milliLiter(s) Oral Mucosa every 12 hours  collagenase Ointment 1 Application(s) Topical two times a day  dexMEDEtomidine Infusion 0.228 MICROgram(s)/kG/Hr IV Continuous <Continuous>  doxercalciferol Injectable 2 MICROGram(s) IV Push <User Schedule>  epoetin mana-epbx (RETACRIT) Injectable 89803 Unit(s) IV Push <User Schedule>  fentaNYL   Infusion. 0.5 MICROgram(s)/kG/Hr IV Continuous <Continuous>  heparin  Infusion 1200 Unit(s)/Hr IV Continuous <Continuous>  lactulose Syrup 15 Gram(s) Oral three times a day  levETIRAcetam  IVPB 375 milliGRAM(s) IV Intermittent every 12 hours  levETIRAcetam  IVPB 250 milliGRAM(s) IV Intermittent <User Schedule>  pantoprazole  Injectable 40 milliGRAM(s) IV Push daily  phenylephrine    Infusion 0.1 MICROgram(s)/kG/Min IV Continuous <Continuous>  polyethylene glycol 3350 17 Gram(s) Oral at bedtime  senna 2 Tablet(s) Oral at bedtime  valproic  acid Syrup 250 milliGRAM(s) Oral two times a day    PRN MEDICATIONS    VITALS:   T(F): 98.8  HR: 82  BP: --  RR: 12  SpO2: 100%    LABS:                        9.2    11.00 )-----------( 263      ( 16 Apr 2021 04:30 )             29.4     04-16    147<H>  |  108  |  31<H>  ----------------------------<  169<H>  4.0   |  24  |  3.0<H>    Ca    8.2<L>      16 Apr 2021 04:30  Mg     2.0     04-16    TPro  5.0<L>  /  Alb  2.4<L>  /  TBili  0.3  /  DBili  x   /  AST  16  /  ALT  <5  /  AlkPhos  163<H>  04-16    PT/INR - ( 16 Apr 2021 04:30 )   PT: 25.10 sec;   INR: 2.18 ratio         PTT - ( 16 Apr 2021 04:30 )  PTT:64.1 sec    ABG - ( 16 Apr 2021 03:05 )  pH, Arterial: 7.37  pH, Blood: x     /  pCO2: 50    /  pO2: 96    / HCO3: 29    / Base Excess: 2.6   /  SaO2: 98        RADIOLOGY:    PHYSICAL EXAM:  GEN: No acute distress  LUNGS: Clear to auscultation bilaterally   HEART: S1/S2 present. RRR.   ABD: Soft, non-tender, non-distended. Bowel sounds present  EXT: NC/NC/NE/2+PP/GEORGE  NEURO: AAOX3

## 2021-04-16 NOTE — PROGRESS NOTE ADULT - SUBJECTIVE AND OBJECTIVE BOX
Orthopaedics Progress Note    GUS WILBURN  [id number]    Patient is a 70y year old Female with right hip fracture  POD#2 from R hip ORIF  Patient seen and examined bedside  She is intubated and sedated in the CCU    Vital Signs Last 24 Hrs  T(C): 37.1 (16 Apr 2021 04:00), Max: 37.7 (15 Apr 2021 16:00)  T(F): 98.8 (16 Apr 2021 04:00), Max: 99.8 (15 Apr 2021 16:00)  HR: 82 (16 Apr 2021 06:00) (73 - 100)  BP: --  BP(mean): --  RR: 12 (16 Apr 2021 06:00) (5 - 21)  SpO2: 100% (16 Apr 2021 06:00) (99% - 100%)    MEDICATIONS  (STANDING):  aMIOdarone    Tablet 200 milliGRAM(s) Oral two times a day  aspirin  chewable 81 milliGRAM(s) Oral daily  atorvastatin 40 milliGRAM(s) Oral at bedtime  BACItracin   Ointment 1 Application(s) Topical every 12 hours  ceFAZolin   IVPB 1000 milliGRAM(s) IV Intermittent every 8 hours  chlorhexidine 0.12% Liquid 15 milliLiter(s) Oral Mucosa every 12 hours  collagenase Ointment 1 Application(s) Topical two times a day  dexMEDEtomidine Infusion 0.228 MICROgram(s)/kG/Hr (3.3 mL/Hr) IV Continuous <Continuous>  doxercalciferol Injectable 2 MICROGram(s) IV Push <User Schedule>  epoetin mana-epbx (RETACRIT) Injectable 41710 Unit(s) IV Push <User Schedule>  fentaNYL   Infusion. 0.5 MICROgram(s)/kG/Hr (2.9 mL/Hr) IV Continuous <Continuous>  heparin  Infusion 1200 Unit(s)/Hr (12 mL/Hr) IV Continuous <Continuous>  lactulose Syrup 15 Gram(s) Oral three times a day  levETIRAcetam  IVPB 375 milliGRAM(s) IV Intermittent every 12 hours  levETIRAcetam  IVPB 250 milliGRAM(s) IV Intermittent <User Schedule>  pantoprazole  Injectable 40 milliGRAM(s) IV Push daily  phenylephrine    Infusion 0.1 MICROgram(s)/kG/Min (1.09 mL/Hr) IV Continuous <Continuous>  polyethylene glycol 3350 17 Gram(s) Oral at bedtime  senna 2 Tablet(s) Oral at bedtime  valproic  acid Syrup 250 milliGRAM(s) Oral two times a day    MEDICATIONS  (PRN):      Physical Exam  NAD  ET tube in place, sedated  Resting comfortably    RLE  Prevena dressing intact with good seal  Distal aquacel in place, clean  Motor: TA/EHL/Gastroc intact  Sensory: SP/DP/Lora/Sa intact  Vasc: foot WWP, 2+ DP pulse    Labs                        9.3    9.70  )-----------( 248      ( 15 Apr 2021 15:55 )             29.5     04-15    143  |  105  |  45<H>  ----------------------------<  241<H>  4.0   |  24  |  3.8<H>    Ca    8.3<L>      15 Apr 2021 04:45  Mg     2.1     04-15    TPro  5.1<L>  /  Alb  2.4<L>  /  TBili  0.2  /  DBili  x   /  AST  14  /  ALT  <5  /  AlkPhos  190<H>  04-15    LIVER FUNCTIONS - ( 15 Apr 2021 04:45 )  Alb: 2.4 g/dL / Pro: 5.1 g/dL / ALK PHOS: 190 U/L / ALT: <5 U/L / AST: 14 U/L / GGT: x           PT/INR - ( 15 Apr 2021 15:55 )   PT: 20.10 sec;   INR: 1.75 ratio         PTT - ( 15 Apr 2021 15:55 )  PTT:44.6 sec    A/P: Patient is a 70y year old Female as above    WBAT on RLE  DVT ppx: SCD, hep gtt  Abx: ancef q8hrs for 3 doses post-OP  Pain control  Home medications: resume  Trend labs  Dispo: Pending PT recommendations

## 2021-04-16 NOTE — PROGRESS NOTE ADULT - SUBJECTIVE AND OBJECTIVE BOX
DIAGNOSIS:   HOSPITAL DAY #:    STATUS POST:    POST OPERATIVE DAY #:     Vital Signs Last 24 Hrs  T(C): 37.7 (16 Apr 2021 20:00), Max: 37.9 (16 Apr 2021 18:00)  T(F): 99.8 (16 Apr 2021 20:00), Max: 100.2 (16 Apr 2021 18:00)  HR: 101 (16 Apr 2021 20:03) (80 - 120)  BP: --  BP(mean): --  RR: 11 (16 Apr 2021 20:00) (5 - 19)  SpO2: 100% (16 Apr 2021 20:03) (98% - 100%)    SUBJECTIVE: Pt seen    Pain: YES  [ ]   NO [ ]   Nausea: [ ] YES [ ] NO           Vomiting: [ ] YES [ ] NO  Flatus: [ ] YES [ ] NO             Bowel Movement: [ ] YES [ ] NO     Void: [ ]YES [ ]No      ALEE DRAINAGE: SIGNIFICANT [ ]   NOT SIGNIFICANT [ ]   NOT APPLICABLE [ ]  YES [ ] NO    General Appearance: Appears well, NAD  Neck: Supple intubated   Chest: Equal expansion bilaterally, equal breath sounds  CV: Pulse regular presently  Abdomen: Soft [x ] YES [ ]NO  DISTENDED [ ] YES [ ] NO TENDERNESS [ ]YES [ ]NO  INCISIONS: HEALING WELL [ ] YES  [ ] NO ERYTHEMA [ ] YES [ ] NO DRAINAGE [ ] YES  [ ] NO  Extremities: Grossly symmetric, CALF TENDERNESS [ ] YES  [ ] NO  LT buttock abscess site healing    LABS:                        9.2    11.00 )-----------( 263      ( 16 Apr 2021 04:30 )             29.4     04-16    147<H>  |  108  |  31<H>  ----------------------------<  169<H>  4.0   |  24  |  3.0<H>    Ca    8.2<L>      16 Apr 2021 04:30  Mg     2.0     04-16    TPro  5.0<L>  /  Alb  2.4<L>  /  TBili  0.3  /  DBili  x   /  AST  16  /  ALT  <5  /  AlkPhos  163<H>  04-16    PT/INR - ( 16 Apr 2021 04:30 )   PT: 25.10 sec;   INR: 2.18 ratio         PTT - ( 16 Apr 2021 11:46 )  PTT:61.7 sec        ASSESSMENT:     GOOD POST OP COURSE [ ]  YES  [ ] NO  CONDITION IMPROVING  []  YES  [ ]  NO          PLAN:    CONTINUE PRESENT MANAGEMENT  [ ] YES  [ ] NO

## 2021-04-16 NOTE — PROGRESS NOTE ADULT - SUBJECTIVE AND OBJECTIVE BOX
HPI  Patient is a 70y old Female who presents with a chief complaint of s/p mechanical fall (13 Apr 2021 15:03)    Currently admitted to medicine with the primary diagnosis of Cardiopulmonary arrest       Today is hospital day 15d.     INTERVAL HPI / OVERNIGHT EVENTS:  Patient was examined and seen at bedside. This morning she is resting comfortably in bed and reports no new issues or overnight events.     ROS: Otherwise unremarkable     PAST MEDICAL & SURGICAL HISTORY  Diabetes    Congestive heart failure (CHF)    Pleural effusion due to congestive heart failure    End stage renal disease  on dialysis Mon, Wed, Fr    Uterine prolapse    Chronic indwelling Castellon catheter    History of repair of hip fracture  2019    S/P CABG (coronary artery bypass graft)  11/2019    History of thoracentesis    Chronic indwelling Castellon catheter      ALLERGIES  No Known Allergies    MEDICATIONS  STANDING MEDICATIONS  aMIOdarone    Tablet 200 milliGRAM(s) Oral two times a day  aspirin  chewable 81 milliGRAM(s) Oral daily  atorvastatin 40 milliGRAM(s) Oral at bedtime  BACItracin   Ointment 1 Application(s) Topical every 12 hours  ceFAZolin   IVPB 1000 milliGRAM(s) IV Intermittent every 8 hours  chlorhexidine 0.12% Liquid 15 milliLiter(s) Oral Mucosa every 12 hours  collagenase Ointment 1 Application(s) Topical two times a day  dexMEDEtomidine Infusion 0.228 MICROgram(s)/kG/Hr IV Continuous <Continuous>  doxercalciferol Injectable 2 MICROGram(s) IV Push <User Schedule>  epoetin mana-epbx (RETACRIT) Injectable 19965 Unit(s) IV Push <User Schedule>  heparin  Infusion 1200 Unit(s)/Hr IV Continuous <Continuous>  lactulose Syrup 15 Gram(s) Oral three times a day  levETIRAcetam  IVPB 375 milliGRAM(s) IV Intermittent every 12 hours  levETIRAcetam  IVPB 250 milliGRAM(s) IV Intermittent <User Schedule>  pantoprazole  Injectable 40 milliGRAM(s) IV Push daily  phenylephrine    Infusion 0.1 MICROgram(s)/kG/Min IV Continuous <Continuous>  polyethylene glycol 3350 17 Gram(s) Oral at bedtime  senna 2 Tablet(s) Oral at bedtime  valproic  acid Syrup 250 milliGRAM(s) Oral two times a day    PRN MEDICATIONS    VITALS:  T(F): 99.3  HR: 80  BP: --  RR: 12  SpO2: 100%    PHYSICAL EXAM  GEN: NAD, Resting comfortably in bed  PULM: Clear to auscultation bilaterally, No wheezes  CVS: Regular rate and rhythm, S1-S2, no murmurs  ABD: Soft, non-tender, non-distended, no guarding  EXT: No edema  NEURO: A&Ox3, no focal deficits    LABS                        9.2    11.00 )-----------( 263      ( 16 Apr 2021 04:30 )             29.4     04-16    147<H>  |  108  |  31<H>  ----------------------------<  169<H>  4.0   |  24  |  3.0<H>    Ca    8.2<L>      16 Apr 2021 04:30  Mg     2.0     04-16    TPro  5.0<L>  /  Alb  2.4<L>  /  TBili  0.3  /  DBili  x   /  AST  16  /  ALT  <5  /  AlkPhos  163<H>  04-16    PT/INR - ( 16 Apr 2021 04:30 )   PT: 25.10 sec;   INR: 2.18 ratio         PTT - ( 16 Apr 2021 04:30 )  PTT:64.1 sec    ABG - ( 16 Apr 2021 03:05 )  pH, Arterial: 7.37  pH, Blood: x     /  pCO2: 50    /  pO2: 96    / HCO3: 29    / Base Excess: 2.6   /  SaO2: 98            RADIOLOGY    < from: Xray Chest 1 View- PORTABLE-Routine (Xray Chest 1 View- PORTABLE-Routine in AM.) (04.16.21 @ 06:42) >  EXAM:  XR CHEST PORTABLE ROUTINE 1V            PROCEDURE DATE:  04/16/2021            INTERPRETATION:  Clinical History / Reason for exam: Line placement.    Comparison : Chest radiograph April 15, 2021.    Technique/Positioning: Single frontal chest x-ray obtained.    Findings:    Support devices: Unchanged right dialysis catheter, left IJ central venous catheter, left ICD, endotracheal tube, enteric tube.    Cardiac/mediastinum/hilum: Unchanged.    Lung parenchyma/Pleura: No radiographic evidence of acute cardiopulmonary disease..    Skeleton/soft tissues: Unchanged.    Impression:    No radiographic evidence of acute cardiopulmonary disease.    Stable support devices.                BEAN CROWLEY MD; Attending Radiologist  This document has been electronically signed. Apr 16 2021  8:11AM    < end of copied text >      Assessment and Plan:   · Assessment	  69 y/o F with a pmhx of CAD s/p CABG (Nov 2019), HFrEF (EF 20%), AS, ESRD (MWF), bladder prolapse requiring chronic Castellon, Diabetes Type 2 on insulin presents from home s/p mechanical fall. Trauma workup in ED significant for R humeral fracture / R hip fracture / non displaced odontoid fracture/ acute rib fractures. In the ED patient arrested, ROSC achieved after 2 minutes of CPR. EKG at the time showed RBBB with wide complex tachy. Pt subsequently intubated and sedated, started on pressors. Pt Experienced seizure-like episode during weaning trial s/p 2mg Versed.  (4/2/21) Weaning trial passed. Patient was Extubated . 02 sat began to decrease to low 80s, tachypenic and cyanotic started on bipap with no improvement. Patient again became hypotensive and tachy. Went into Afib. s/p cardioversion x3 and started on amio     # RT Humerus/Rt femur fracture/Rt Hip fracture   - Ortho following  - Pain control. Currently on Fentanyl drip   - S/p Intramedullary Rodding of fracture of Rt femur   - S/p 1 u PRBCs intraoperatively     #Cardiac arrest in the setting of arythmia and CHF exacerbation   - s/p LHC (4/9) - Patent LIMA and SVG grafts, Transaortic gradient ~ 10-15mmHg  - Trops .22-->.24 -->.21-->.34 (4/4/21)  - CTA negative for PE   - Lower extremity duplex negative for DVT( 4/6/21)  - s/p BIV ICD placement. EP interrogation no events   - Weaning trial. Plan for extubation today.     # Neck Ulceration  - Burn Consult to evaluate for neck ulceration     #LUE DVT  - UE duplex : Thrombus noted in the left brachial vein.  - Started on Heparin     # Seizure unknown etiology  - CT Head (4/1) negative   - Keppra 500mg adminstered. followed by 375mg BID with additional 250mg dose after dialysis  - Started on Depakote 250mg PO BID   - VEEG - bifrontal sharps but no seizure activity. Consistent with metabolic process.     # Aspiration Pneumonia vs Adenovirus Pneumonia   - CXR : Stable bilateral opacities. No pneumothorax. Support devices, in satisfactory position.  - s/p Completion of ceftriaxone       # Perirectal abscess  - surgery is following patient. Dr Mota   - s/p debridement over a month. No need for packing as per surgery     #Odontoid fracture   - Neurosurgery following patient   - Want to c/w collar      #ESRD  - Nephro following patient  - HD 4/15       # h/o DM   - fingersticks tid  - hold home insulin  - insulin protocol if > 180    # DVT PPX: heparin subq  # GI PPX: PPI  # Diet: NPO   # Activity: bedrest  # Bowel regimen

## 2021-04-16 NOTE — PROGRESS NOTE ADULT - ATTENDING COMMENTS
Pt. seen/ examined  Sedated/ intubated  Exam limited  Prevena functional  Labs/ vitals reviewed  Care per ICU  Will follow

## 2021-04-16 NOTE — CONSULT NOTE ADULT - ASSESSMENT
70y old  Female who presents with pressure ulcers to neck and face.   - no recommendation for surgery at this time   - LWC: apply santyl, hydrogel and xeroform to wounds BID

## 2021-04-16 NOTE — CHART NOTE - NSCHARTNOTEFT_GEN_A_CORE
Registered Dietitian Follow-Up     Patient Profile Reviewed                           Yes [x]   No []     Nutrition History Previously Obtained        Yes []  No [x]       Pertinent Subjective Information: Pt. remains intubated/sedated. NPO since OR yesterday.  Ve: 4.7, Tmax 37.4, MAP 78.     Pertinent Medical Interventions: - Cardiac arrest in the setting of arrythmia? s/p cath 4/9/21 triple vessel disease with open grafts. - RT Humerus/Rt femur fracture/Rt Hip fracture - s/p hip surgery 4/15. ESRD on HD: Nephro following. Hx of DM: monitoring. failed SBT trials, possible trach?     Diet order: NPO     Anthropometrics:  - Ht. 157.5cm  - Wt. 69.9kg on 4/16 vs. (4/6): 58kg vs. (4/12): 58kg vs. (4/13): 65.3kgwill continue to monitor   - %wt change  - BMI 23.4  - IBW      Pertinent Lab Data: (4/16) WBC 11.0, RBC 3.18, Hg 9.2, Hct 29.4, Na 147, BUN 31, creat 3.0, glu 169, alk phos 163, eGFR 15     Pertinent Meds: Atorvastatin, lactulose, Miralax, Senna, Fentanyl, Phenylephrine      Physical Findings:  - Appearance: intubated, 1+ R hand, R foot, 2+ L ,R leg edema noted  - GI function: abd noted soft/nontender per flow sheets, last BM 4/15  - Tubes: OGT  - Oral/Mouth cavity: NPO  - Skin: pressure ulcer unstageable to chin, L shoulder      Nutrition Requirements (from RD note on 4/13)   Weight Used: 58kg      Estimated Energy Needs    Continue []  Adjust [x] 1200kcal (PSE 2003b)   Adjusted Energy Recommendations:   kcal/day        Estimated Protein Needs    Continue [x]  Adjust []  81-98g/day (1.4-1.7 g/kg of ABW) - aim high for HD  Adjusted Protein Recommendations:   gm/day        Estimated Fluid Needs        Continue [x]  Adjust [] per CCU team  Adjusted Fluid Recommendations:   mL/day     Nutrient Intake: NPO        [] Previous Nutrition Diagnosis:  Inadequate protein/energy intake            [x] Ongoing          [] Resolved       Nutrition Intervention: enteral and parenteral nutrition    Rec: When medically feasible to resume EN provide Peptamen AF @40ml/h with No Carb Prosource TF daily for 1192kcal, 87g protein, 777ml free H2O. Initiate !20ml/h and increase by 10ml q6h. Additional free H2O per LIP. Feed only if MAP consistently >65.      Goal/Expected Outcome: In 4 days TF to provide >85% est energy needs, but not exceed 105%     Indicator/Monitoring: diet order, energy intake, body composition, nutrition focused physical findings (EN tolerance, renal profile)

## 2021-04-16 NOTE — PROGRESS NOTE ADULT - SUBJECTIVE AND OBJECTIVE BOX
Patient is a 70y old  Female who presents with a chief complaint of s/p mechanical fall (13 Apr 2021 15:03)      Over Night Events:  Patient seen and examined.   still intubated   s/p ORIF right femur   on levo   on fentanyl and precedex   s/p 1 unit prbc     ROS:  See HPI    PHYSICAL EXAM    ICU Vital Signs Last 24 Hrs  T(C): 37.1 (16 Apr 2021 04:00), Max: 37.7 (15 Apr 2021 16:00)  T(F): 98.8 (16 Apr 2021 04:00), Max: 99.8 (15 Apr 2021 16:00)  HR: 82 (16 Apr 2021 06:00) (73 - 100)  BP: --  BP(mean): --  ABP: 88/52 (16 Apr 2021 06:00) (80/40 - 148/66)  ABP(mean): 64 (16 Apr 2021 06:00) (52 - 96)  RR: 12 (16 Apr 2021 06:00) (5 - 21)  SpO2: 100% (16 Apr 2021 06:00) (99% - 100%)      General: open eyes   HEENT:    et tube            Lymph Nodes: NO cervical LN   Lungs: Bilateral BS  Cardiovascular: Regular   Abdomen: Soft, Positive BS  Extremities: No clubbing   Skin: warm   Neurological:   Musculoskeletal: move all ext     I&O's Detail    15 Apr 2021 07:01  -  16 Apr 2021 07:00  --------------------------------------------------------  IN:    Dexmedetomidine: 81.3 mL    Dexmedetomidine: 23.2 mL    FentaNYL: 133.4 mL    FentaNYL: 46.4 mL    Heparin: 48 mL    Heparin: 24 mL    IV PiggyBack: 104 mL    Phenylephrine: 32 mL    Phenylephrine: 13.2 mL  Total IN: 505.5 mL    OUT:    Indwelling Catheter - Urethral (mL): 8 mL    Other (mL): 2000 mL  Total OUT: 2008 mL    Total NET: -1502.5 mL          LABS:                          9.2    11.00 )-----------( 263      ( 16 Apr 2021 04:30 )             29.4         16 Apr 2021 04:30    147    |  108    |  31     ----------------------------<  169    4.0     |  24     |  3.0      Ca    8.2        16 Apr 2021 04:30  Mg     2.0       16 Apr 2021 04:30    TPro  5.0    /  Alb  2.4    /  TBili  0.3    /  DBili  x      /  AST  16     /  ALT  <5     /  AlkPhos  163    16 Apr 2021 04:30  Amylase x     lipase x                                                 PT/INR - ( 16 Apr 2021 04:30 )   PT: 25.10 sec;   INR: 2.18 ratio         PTT - ( 16 Apr 2021 04:30 )  PTT:64.1 sec                                                                                                                                                Mode: AC/ CMV (Assist Control/ Continuous Mandatory Ventilation)  RR (machine): 12  TV (machine): 350  FiO2: 30  PEEP: 5  ITime: 1  MAP: 8  PIP: 20                                      ABG - ( 16 Apr 2021 03:05 )  pH, Arterial: 7.37  pH, Blood: x     /  pCO2: 50    /  pO2: 96    / HCO3: 29    / Base Excess: 2.6   /  SaO2: 98                  MEDICATIONS  (STANDING):  aMIOdarone    Tablet 200 milliGRAM(s) Oral two times a day  aspirin  chewable 81 milliGRAM(s) Oral daily  atorvastatin 40 milliGRAM(s) Oral at bedtime  BACItracin   Ointment 1 Application(s) Topical every 12 hours  ceFAZolin   IVPB 1000 milliGRAM(s) IV Intermittent every 8 hours  chlorhexidine 0.12% Liquid 15 milliLiter(s) Oral Mucosa every 12 hours  collagenase Ointment 1 Application(s) Topical two times a day  dexMEDEtomidine Infusion 0.228 MICROgram(s)/kG/Hr (3.3 mL/Hr) IV Continuous <Continuous>  doxercalciferol Injectable 2 MICROGram(s) IV Push <User Schedule>  epoetin mana-epbx (RETACRIT) Injectable 11119 Unit(s) IV Push <User Schedule>  fentaNYL   Infusion. 0.5 MICROgram(s)/kG/Hr (2.9 mL/Hr) IV Continuous <Continuous>  heparin  Infusion 1200 Unit(s)/Hr (12 mL/Hr) IV Continuous <Continuous>  lactulose Syrup 15 Gram(s) Oral three times a day  levETIRAcetam  IVPB 375 milliGRAM(s) IV Intermittent every 12 hours  levETIRAcetam  IVPB 250 milliGRAM(s) IV Intermittent <User Schedule>  pantoprazole  Injectable 40 milliGRAM(s) IV Push daily  phenylephrine    Infusion 0.1 MICROgram(s)/kG/Min (1.09 mL/Hr) IV Continuous <Continuous>  polyethylene glycol 3350 17 Gram(s) Oral at bedtime  senna 2 Tablet(s) Oral at bedtime  valproic  acid Syrup 250 milliGRAM(s) Oral two times a day    MEDICATIONS  (PRN):          Xrays:  TLC:  OG:  ET tube:                                                                                    stable  no infiltrate    ECHO:  CAM ICU:

## 2021-04-16 NOTE — PROGRESS NOTE ADULT - SUBJECTIVE AND OBJECTIVE BOX
[facility]PHROLOGY FOLLOW UP NOTE  --------------------------------------------------------------------------------  24 hour events/subjective: Patient examined. Intubated.    PAST HISTORY  --------------------------------------------------------------------------------  No significant changes to PMH, PSH, FHx, SHx, unless otherwise noted    ALLERGIES & MEDICATIONS  --------------------------------------------------------------------------------  Allergies    No Known Allergies    Standing Inpatient Medications  aMIOdarone    Tablet 200 milliGRAM(s) Oral two times a day  aspirin  chewable 81 milliGRAM(s) Oral daily  atorvastatin 40 milliGRAM(s) Oral at bedtime  BACItracin   Ointment 1 Application(s) Topical every 12 hours  ceFAZolin   IVPB 1000 milliGRAM(s) IV Intermittent every 8 hours  chlorhexidine 0.12% Liquid 15 milliLiter(s) Oral Mucosa every 12 hours  collagenase Ointment 1 Application(s) Topical two times a day  dexMEDEtomidine Infusion 0.228 MICROgram(s)/kG/Hr IV Continuous <Continuous>  doxercalciferol Injectable 2 MICROGram(s) IV Push <User Schedule>  epoetin mana-epbx (RETACRIT) Injectable 06420 Unit(s) IV Push <User Schedule>  heparin  Infusion 1200 Unit(s)/Hr IV Continuous <Continuous>  lactulose Syrup 15 Gram(s) Oral three times a day  levETIRAcetam  IVPB 375 milliGRAM(s) IV Intermittent every 12 hours  levETIRAcetam  IVPB 250 milliGRAM(s) IV Intermittent <User Schedule>  pantoprazole  Injectable 40 milliGRAM(s) IV Push daily  phenylephrine    Infusion 0.1 MICROgram(s)/kG/Min IV Continuous <Continuous>  polyethylene glycol 3350 17 Gram(s) Oral at bedtime  senna 2 Tablet(s) Oral at bedtime  sertraline 50 milliGRAM(s) Oral daily  valproic  acid Syrup 250 milliGRAM(s) Oral two times a day    VITALS/PHYSICAL EXAM  --------------------------------------------------------------------------------  T(C): 37.4 (04-16-21 @ 08:00), Max: 37.7 (04-15-21 @ 16:00)  HR: 90 (04-16-21 @ 09:00) (80 - 100)  BP: --  RR: 14 (04-16-21 @ 09:00) (5 - 21)  SpO2: 100% (04-16-21 @ 09:00) (99% - 100%)  Wt(kg): --  Height (cm): 157.5 (04-15-21 @ 19:07)  Weight (kg): 58 (04-15-21 @ 19:07)  BMI (kg/m2): 23.4 (04-15-21 @ 19:07)  BSA (m2): 1.58 (04-15-21 @ 19:07)      04-15-21 @ 07:01  -  04-16-21 @ 07:00  --------------------------------------------------------  IN: 537.9 mL / OUT: 2008 mL / NET: -1470.1 mL    04-16-21 @ 07:01  -  04-16-21 @ 10:46  --------------------------------------------------------  IN: 56.5 mL / OUT: 0 mL / NET: 56.5 mL    Physical Exam:  	Gen: NAD  	Pulm: CTA B/L  	CV: RRR, S1S2  	Abd: +BS, soft, nontender/nondistended  	: No suprapubic tenderness  	LE: Warm, no edema  	Vascular access: TDC    LABS/STUDIES  --------------------------------------------------------------------------------              9.2    11.00 >-----------<  263      [04-16-21 @ 04:30]              29.4     147  |  108  |  31  ----------------------------<  169      [04-16-21 @ 04:30]  4.0   |  24  |  3.0        Ca     8.2     [04-16-21 @ 04:30]      Mg     2.0     [04-16-21 @ 04:30]    TPro  5.0  /  Alb  2.4  /  TBili  0.3  /  DBili  x   /  AST  16  /  ALT  <5  /  AlkPhos  163  [04-16-21 @ 04:30]    PT/INR: PT 25.10, INR 2.18       [04-16-21 @ 04:30]  PTT: 64.1       [04-16-21 @ 04:30]    Creatinine Trend:  SCr 3.0 [04-16 @ 04:30]  SCr 3.8 [04-15 @ 04:45]  SCr 3.6 [04-14 @ 05:15]  SCr 4.2 [04-13 @ 04:40]  SCr 3.7 [04-12 @ 05:08]    Urinalysis - [04-02-21 @ 10:44]      Color Yellow / Appearance Turbid / SG 1.027 / pH 7.0      Gluc Negative / Ketone Negative  / Bili Negative / Urobili <2 mg/dL       Blood Moderate / Protein 100 mg/dL / Leuk Est Large / Nitrite Negative      RBC 14 / WBC >720 / Hyaline 16 / Gran  / Sq Epi  / Non Sq Epi 3 / Bacteria Moderate    Iron 55, TIBC 167, %sat 33      [05-27-20 @ 13:22]  Ferritin 636      [05-27-20 @ 13:22]  HbA1c 5.7      [11-09-19 @ 01:20]  TSH 1.71      [04-06-21 @ 04:40]

## 2021-04-16 NOTE — CONSULT NOTE ADULT - ASSESSMENT
IMP:  - fall with multiple fractures  - cardiac arrest    - cardiogenic shock    - EF 20%  - resp failure/ vent dependence  - ESRD on HD  - DM     REE by PSE 1262 kcal/d, est protein needs ~ 80 gm/d  Nepro not indicated  please obtain phos level with other am labs, lytes  document BMs, and note that pt is on a large amount of fiber and cathartic meds  change feeds for now to Pepatmen AF at 45 ml/h (alternatively 375 ml q8h with PRE-FEED poc glucose and Lispro as needed  add Chris twice a day - mix each package in ~ 4 oz water and give via NG over 10 min immediately prior to first 2 feeds of the day

## 2021-04-16 NOTE — CONSULT NOTE ADULT - SUBJECTIVE AND OBJECTIVE BOX
69 yo F with PMH of CAD s/p CABG (November 2019), HFrEF (EF 20%), AS, ESRD (MWF), bladder prolapse requiring chronic Castellon, Diabetes Type 2 on insulin presents from home s/p mechanical fall. When the history was taken in the emergency department, she The denied any dizziness/ palpitations/ aura/vertigo. She reported non-radiating R shoulder pain and non-radiating R hip pain on arrival. She was found to have R humeral fracture / R hip fracture / non displaced odontoid fracture/ acute rib fractures/ ? T6 vertebral fracture.   In the emergency department, while the patient was receiving trauma work up she was found pulseless by a PCA in the room ( she was not on the monitor at that time) and had a cardiac arrest. ROSC was achieved after 2 minutes of CPR (1 epinephrine was given and 1 calcium chloride iv). Patient was intubated and sedated. EKG showed RBBB with wide complex tachycardia. Per family at the bedside, the patient had her last hemodialysis day pta (continued inhospital) and has been relatively non compliant with her medications and medical care at home.  pt intubated, required pressors, extubated 4/2 but required reintubation. and cardioversion  + neck ulceration from neck collar  + L brachial DVT  + seizure activity  + pneumonia vs pneumonitis , bilat opacities on CXR  + perirectal abscess, chronic  + OR - tonie placed R femur on 4/15  + continues HD now TTS  Feeds off when pt seen this morning, for SBT - but not to be extubated today. Possible retry in am, vs consider trach next week.    Vital Signs Last 24 Hrs  T(C): 37.2 (16 Apr 2021 12:00), Max: 37.7 (15 Apr 2021 19:07)  T(F): 99 (16 Apr 2021 12:00), Max: 99.8 (16 Apr 2021 00:00)  HR: 116 (16 Apr 2021 16:00) (80 - 116)  BP: --  BP(mean): --  RR: 18 (16 Apr 2021 16:00) (5 - 21)  SpO2: 100% (16 Apr 2021 16:00) (98% - 100%)  Drug Dosing Weight  Height (cm): 157.5 (15 Apr 2021 19:07)  Weight (kg): 58 (15 Apr 2021 19:07)  BMI (kg/m2): 23.4 (15 Apr 2021 19:07)  BSA (m2): 1.58 (15 Apr 2021 19:07)  intubated, sedated, opens eyes, moves R hand, but does not follow commands  R sc [place] c/d/i  L sc TLC with gauze dressing, open at top  R neck skin breakdown w dressing  proper small bore OG feeding tube in place  abd soft, ND  R leg swollen    MEDICATIONS  (STANDING):  aMIOdarone    Tablet 200 milliGRAM(s) Oral two times a day  aspirin  chewable 81 milliGRAM(s) Oral daily  atorvastatin 40 milliGRAM(s) Oral at bedtime  BACItracin   Ointment 1 Application(s) Topical every 12 hours  chlorhexidine 0.12% Liquid 15 milliLiter(s) Oral Mucosa every 12 hours  collagenase Ointment 1 Application(s) Topical two times a day  dexMEDEtomidine Infusion 0.228 MICROgram(s)/kG/Hr (3.3 mL/Hr) IV Continuous <Continuous>  doxercalciferol Injectable 2 MICROGram(s) IV Push <User Schedule>  epoetin mana-epbx (RETACRIT) Injectable 20895 Unit(s) IV Push <User Schedule>  heparin  Infusion 1200 Unit(s)/Hr (12 mL/Hr) IV Continuous <Continuous>  lactulose Syrup 15 Gram(s) Oral three times a day  levETIRAcetam  IVPB 375 milliGRAM(s) IV Intermittent every 12 hours  levETIRAcetam  IVPB 250 milliGRAM(s) IV Intermittent <User Schedule>  pantoprazole  Injectable 40 milliGRAM(s) IV Push daily  phenylephrine    Infusion 0.1 MICROgram(s)/kG/Min (1.09 mL/Hr) IV Continuous <Continuous>off earlier  polyethylene glycol 3350 17 Gram(s) Oral at bedtime  senna 2 Tablet(s) Oral at bedtime  sertraline 50 milliGRAM(s) Oral daily  valproic  acid Syrup 250 milliGRAM(s) Oral two times a day                        9.2    11.00 )-----------( 263      ( 16 Apr 2021 04:30 )             29.4   04-16    147<H>  |  108  |  31<H>  ----------------------------<  169<H>  4.0   |  24  |  3.0<H>    Ca    8.2<L>      16 Apr 2021 04:30  Mg     2.0     04-16    TPro  5.0<L>  /  Alb  2.4<L>  /  TBili  0.3  /  DBili  x   /  AST  16  /  ALT  <5  /  AlkPhos  163<H>  04-16    Phosphorus Level, Serum: 4.2 mg/dL (04.07.21 @ 04:20)   A1C with Estimated Average Glucose (03.03.21 @ 06:45)   A1C with Estimated Average Glucose Result: 5.1:   POCT Blood Glucose.: 172 mg/dL (16 Apr 2021 16:20)  ABG - ( 16 Apr 2021 16:24 )  pH, Arterial: 7.42  pH, Blood: x     /  pCO2: 41    /  pO2: 113   / HCO3: 27    / Base Excess: 1.9   /  SaO2: 98

## 2021-04-17 NOTE — PROGRESS NOTE ADULT - SUBJECTIVE AND OBJECTIVE BOX
patient seen and examined in CCU.  s/p HMD today.  patient opens eyes but does not track or follow commands.  patient on presidex and phenylephrine drips.  patient had HMD earlier today via right Tesio catheter  Vital Signs Last 24 Hrs  T(C): 36.4 (17 Apr 2021 12:00), Max: 37.9 (16 Apr 2021 18:00)  T(F): 97.5 (17 Apr 2021 12:00), Max: 100.2 (16 Apr 2021 18:00)  HR: 84 (17 Apr 2021 15:00) (84 - 118)  RR: 16 (17 Apr 2021 15:00) (11 - 23)  SpO2: 100% (17 Apr 2021 15:00) (97% - 100%)        conj pale, no jaundice  neck - patient has left IJ catheter. neck veins not visible  patient has left AICD - not tender no swelling or redness anterior left chest wall.  right chest wall has tuenneled catheter also no drainage, redness, discharge  heart exam - patient has irregular pulse , has heave anterior chest wall, has holosystolic murmur heard over anterior chest wall. no gallop.  lungs clear to auscultation  abd. soft nontender.  Bs pos.  extremities    no edema. no cyanosis             7.5    11.27 )-----------( 313      ( 17 Apr 2021 11:36 )             23.5   04-17    145  |  106  |  42<H>  ----------------------------<  209<H>  4.0   |  23  |  3.6<H>    Ca    8.5      17 Apr 2021 04:27  Phos  5.2     04-17  Mg     2.0     04-17    TPro  4.8<L>  /  Alb  2.3<L>  /  TBili  0.3  /  DBili  x   /  AST  10  /  ALT  <5  /  AlkPhos  138<H>  04-17

## 2021-04-17 NOTE — CONSULT NOTE ADULT - SUBJECTIVE AND OBJECTIVE BOX
GENERAL SURGERY CONSULT NOTE    Patient: GUS WILBURN , 70y ([birth year])Female   MRN: [id number]  Location: [facility]  Visit: 04-01-21 Inpatient  Date: 04-17-21 @ 16:41    HPI:  69 yo F with PMH of CAD s/p CABG (November 2019), HFrEF (EF 20%), AS, ESRD (MWF), bladder prolapse requiring chronic Castellon, Diabetes Type 2 on insulin presents from home s/p mechanical fall. When the history was taken in the emergency department, she The denied any dizziness/ palpitations/ aura/vertigo. She reported non-radiating R shoulder pain and non-radiating R hip pain on arrival. She was found to have R humeral fracture / R hip fracture / non displaced odontoid fracture/ acute rib fractures/ ? T6 vertebral fracture.     In the emergency department, while the patient was receiving trauma work up she was found pulseless by a PCA in the room ( she was not on the monitor at that time) and had a cardiac arrest. ROSC was achieved after 2 minutes of CPR (1 epinephrine was given and 1 calcium chloride iv). Patient was intubated and sedated. EKG showed RBBB with wide complex tachycardia. Per family at the bedside, the patient had her last hemodialysis yesterday and has been relatively non compliant with her medications and medical care at home. Unable to obtain ros as patient is sedated.     T(C): 35 , HR: 60, BP: 158/67, RR: 20, SpO2: 99% vented  Labs: d-dimer ~ 4700, p-bnp 70,000, trop (0.20 <--0.17)  ABG pH, Arterial: 7.45, CO2: 36, Arterial O2: 63 , HCO3: 25 , Base Excess: 1.4 , SaO2: 94      CTH (-)                (01 Apr 2021 07:42)      PAST MEDICAL & SURGICAL HISTORY:  Diabetes    Congestive heart failure (CHF)    Pleural effusion due to congestive heart failure    End stage renal disease  on dialysis Mon, Wed, Fr    Uterine prolapse    Chronic indwelling Castellon catheter    History of repair of hip fracture  2019    S/P CABG (coronary artery bypass graft)  11/2019    History of thoracentesis    Chronic indwelling Castellon catheter        Home Medications:  aspirin 81 mg oral tablet, chewable: 1 tab(s) orally once a day (01 Apr 2021 11:06)  atorvastatin 40 mg oral tablet: 1 tab(s) orally once a day (01 Apr 2021 11:06)  INSULIN LISPRO KWIKPEN  100 UNIT/ML SOPN:  (01 Apr 2021 11:06)  METOLAZONE  5 MG TABS: 1  orally once a day (01 Apr 2021 11:06)  METOPROLOL SUCCINATE ER  25 MG TB24: 1  orally 2 times a day (01 Apr 2021 11:06)  SERTRALINE HCL  50 MG TABS:  (01 Apr 2021 11:06)  SEVELAMER CARBONATE 800MG TABLETS: TK 2 TS PO TID WITH MEALS (01 Apr 2021 11:06)  TORSEMIDE 20MG TABLETS: TK 3 TS PO BID (01 Apr 2021 11:06)  VITAMIN D2 81440AO (ERGO) CAP RX: TK 1 C PO WEEKLY (01 Apr 2021 11:06)        VITALS:  T(F): 97.5 (04-17-21 @ 12:00), Max: 100.2 (04-16-21 @ 18:00)  HR: 84 (04-17-21 @ 15:00) (84 - 118)  BP: --  RR: 16 (04-17-21 @ 15:00) (11 - 23)  SpO2: 100% (04-17-21 @ 15:00) (97% - 100%)    PHYSICAL EXAM:  Physical Exam  NAD  ET tube in place, sedated  Resting comfortably  RLE  Prevena dressing intact with good seal  Distal aquacel in place, saturated and new aquacell applied  Motor: TA/EHL/Gastroc intact  Sensory: SP/DP/Lora/Sa intact  Vasc: foot WWP, 2+ DP pulse      MEDICATIONS  (STANDING):  aMIOdarone    Tablet 200 milliGRAM(s) Oral two times a day  aspirin  chewable 81 milliGRAM(s) Oral daily  atorvastatin 40 milliGRAM(s) Oral at bedtime  BACItracin   Ointment 1 Application(s) Topical every 12 hours  chlorhexidine 0.12% Liquid 15 milliLiter(s) Oral Mucosa every 12 hours  collagenase Ointment 1 Application(s) Topical two times a day  dexMEDEtomidine Infusion 0.228 MICROgram(s)/kG/Hr (3.3 mL/Hr) IV Continuous <Continuous>  doxercalciferol Injectable 2 MICROGram(s) IV Push <User Schedule>  epoetin mana-epbx (RETACRIT) Injectable 22920 Unit(s) IV Push <User Schedule>  fentaNYL   Patch  25 MICROgram(s)/Hr 1 Patch Transdermal every 72 hours  lactulose Syrup 15 Gram(s) Oral three times a day  levETIRAcetam  IVPB 375 milliGRAM(s) IV Intermittent every 12 hours  levETIRAcetam  IVPB 250 milliGRAM(s) IV Intermittent <User Schedule>  pantoprazole  Injectable 40 milliGRAM(s) IV Push daily  phenylephrine    Infusion 0.1 MICROgram(s)/kG/Min (1.09 mL/Hr) IV Continuous <Continuous>  polyethylene glycol 3350 17 Gram(s) Oral at bedtime  senna 2 Tablet(s) Oral at bedtime  sertraline 50 milliGRAM(s) Oral daily  valproic  acid Syrup 250 milliGRAM(s) Oral two times a day    MEDICATIONS  (PRN):  acetaminophen   Tablet .. 650 milliGRAM(s) Oral every 6 hours PRN Temp greater or equal to 38C (100.4F)      LAB/STUDIES:                        7.5    11.27 )-----------( 313      ( 17 Apr 2021 11:36 )             23.5     04-17    145  |  106  |  42<H>  ----------------------------<  209<H>  4.0   |  23  |  3.6<H>    Ca    8.5      17 Apr 2021 04:27  Phos  5.2     04-17  Mg     2.0     04-17    TPro  4.8<L>  /  Alb  2.3<L>  /  TBili  0.3  /  DBili  x   /  AST  10  /  ALT  <5  /  AlkPhos  138<H>  04-17    PT/INR - ( 17 Apr 2021 04:27 )   PT: >40.00 sec;   INR: 4.56 ratio         PTT - ( 17 Apr 2021 04:27 )  PTT:35.5 sec  LIVER FUNCTIONS - ( 17 Apr 2021 04:27 )  Alb: 2.3 g/dL / Pro: 4.8 g/dL / ALK PHOS: 138 U/L / ALT: <5 U/L / AST: 10 U/L / GGT: x                     ABG - ( 17 Apr 2021 02:51 )  pH, Arterial: 7.47  pH, Blood: x     /  pCO2: 36    /  pO2: 101   / HCO3: 26    / Base Excess: 2.7   /  SaO2: 99                  IMAGING:    ASSESSMENT:  70yF w/ PMHx of  ***  who presented with ***. Physical exam findings, imaging, and labs as documented above.     PLAN:  -  -  -    Above plan discussed with  ***

## 2021-04-17 NOTE — PROGRESS NOTE ADULT - SUBJECTIVE AND OBJECTIVE BOX
ORTHO PROGRESS NOTE     Patient is a 70y year old Female with right hip fracture  POD#2 from R hip ORIF  Patient seen and examined bedside  She is intubated and sedated in the CCU    MEDICATIONS  (STANDING):  aMIOdarone    Tablet 200 milliGRAM(s) Oral two times a day  aspirin  chewable 81 milliGRAM(s) Oral daily  atorvastatin 40 milliGRAM(s) Oral at bedtime  BACItracin   Ointment 1 Application(s) Topical every 12 hours  chlorhexidine 0.12% Liquid 15 milliLiter(s) Oral Mucosa every 12 hours  collagenase Ointment 1 Application(s) Topical two times a day  dexMEDEtomidine Infusion 0.228 MICROgram(s)/kG/Hr (3.3 mL/Hr) IV Continuous <Continuous>  doxercalciferol Injectable 2 MICROGram(s) IV Push <User Schedule>  epoetin mana-epbx (RETACRIT) Injectable 19163 Unit(s) IV Push <User Schedule>  heparin  Infusion 1000 Unit(s)/Hr (10 mL/Hr) IV Continuous <Continuous>  lactulose Syrup 15 Gram(s) Oral three times a day  levETIRAcetam  IVPB 375 milliGRAM(s) IV Intermittent every 12 hours  levETIRAcetam  IVPB 250 milliGRAM(s) IV Intermittent <User Schedule>  pantoprazole  Injectable 40 milliGRAM(s) IV Push daily  phenylephrine    Infusion 0.1 MICROgram(s)/kG/Min (1.09 mL/Hr) IV Continuous <Continuous>  polyethylene glycol 3350 17 Gram(s) Oral at bedtime  senna 2 Tablet(s) Oral at bedtime  sertraline 50 milliGRAM(s) Oral daily  valproic  acid Syrup 250 milliGRAM(s) Oral two times a day    MEDICATIONS  (PRN):  acetaminophen   Tablet .. 650 milliGRAM(s) Oral every 6 hours PRN Temp greater or equal to 38C (100.4F)      Vital Signs Last 24 Hrs  T(C): 37.1 (17 Apr 2021 04:00), Max: 37.9 (16 Apr 2021 18:00)  T(F): 98.8 (17 Apr 2021 04:00), Max: 100.2 (16 Apr 2021 18:00)  HR: 95 (17 Apr 2021 07:35) (82 - 120)  BP: --  BP(mean): --  RR: 14 (17 Apr 2021 06:00) (11 - 21)  SpO2: 100% (17 Apr 2021 07:35) (98% - 100%)    Physical Exam  NAD  ET tube in place, sedated  Resting comfortably    RLE  Prevena dressing intact with good seal  Distal aquacel in place, saturated and new aquacell applied  Motor: TA/EHL/Gastroc intact  Sensory: SP/DP/Lora/Sa intact  Vasc: foot WWP, 2+ DP pulse                          6.3    11.20 )-----------( 285      ( 17 Apr 2021 04:27 )             19.9     04-17    145  |  106  |  42<H>  ----------------------------<  209<H>  4.0   |  23  |  3.6<H>    Ca    8.5      17 Apr 2021 04:27  Phos  5.2     04-17  Mg     2.0     04-17    TPro  4.8<L>  /  Alb  2.3<L>  /  TBili  0.3  /  DBili  x   /  AST  10  /  ALT  <5  /  AlkPhos  138<H>  04-17    PT/INR - ( 17 Apr 2021 04:27 )   PT: >40.00 sec;   INR: 4.56 ratio         PTT - ( 17 Apr 2021 04:27 )  PTT:35.5 sec      04-16-21 @ 07:01  -  04-17-21 @ 07:00  --------------------------------------------------------  IN: 981.8 mL / OUT: 65 mL / NET: 916.8 mL          A/P: 70yFemale POD #2 right hip ORIF    WBAT on RLE  DVT ppx: SCD, hep gtt  Abx: ancef q8hrs for 3 doses post-OP  Pain control  Home medications: resume  HGB 6.3: Continue trend labs: Transfuse PRN  Rest of management per primary  Dispo: Pending PT recommendations

## 2021-04-17 NOTE — PROGRESS NOTE ADULT - SUBJECTIVE AND OBJECTIVE BOX
Patient is a 70y old  Female who presents with a chief complaint of s/p mechanical fall (13 Apr 2021 15:03)      Over Night Events:  Patient seen and examined.   still vented on HD still not fully awake of fentnayl     ROS:  See HPI    PHYSICAL EXAM    ICU Vital Signs Last 24 Hrs  T(C): 36.8 (17 Apr 2021 08:00), Max: 37.9 (16 Apr 2021 18:00)  T(F): 98.2 (17 Apr 2021 08:00), Max: 100.2 (16 Apr 2021 18:00)  HR: 96 (17 Apr 2021 08:00) (94 - 120)  BP: --  BP(mean): --  ABP: 134/62 (17 Apr 2021 08:00) (104/60 - 134/62)  ABP(mean): 88 (17 Apr 2021 08:00) (74 - 92)  RR: 16 (17 Apr 2021 08:00) (11 - 21)  SpO2: 100% (17 Apr 2021 08:00) (98% - 100%)      General: awake   HEENT:          et tube       Lymph Nodes: NO cervical LN   Lungs: Bilateral BS  Cardiovascular: Regular   Abdomen: Soft, Positive BS  Extremities: No clubbing   Skin: warm   Neurological: no focal lethargic   Musculoskeletal: move all ext     I&O's Detail    16 Apr 2021 07:01  -  17 Apr 2021 07:00  --------------------------------------------------------  IN:    Dexmedetomidine: 205.9 mL    Enteral Tube Flush: 100 mL    FentaNYL: 11.6 mL    Heparin: 204 mL    Heparin: 50 mL    IV PiggyBack: 100 mL    Peptamen A.F.: 200 mL    Phenylephrine: 110.3 mL  Total IN: 981.8 mL    OUT:    Indwelling Catheter - Urethral (mL): 65 mL  Total OUT: 65 mL    Total NET: 916.8 mL          LABS:                          6.3    11.20 )-----------( 285      ( 17 Apr 2021 04:27 )             19.9         17 Apr 2021 04:27    145    |  106    |  42     ----------------------------<  209    4.0     |  23     |  3.6      Ca    8.5        17 Apr 2021 04:27  Phos  5.2       17 Apr 2021 04:27  Mg     2.0       17 Apr 2021 04:27    TPro  4.8    /  Alb  2.3    /  TBili  0.3    /  DBili  x      /  AST  10     /  ALT  <5     /  AlkPhos  138    17 Apr 2021 04:27  Amylase x     lipase x                                                 PT/INR - ( 17 Apr 2021 04:27 )   PT: >40.00 sec;   INR: 4.56 ratio         PTT - ( 17 Apr 2021 04:27 )  PTT:35.5 sec                                                                                                                                                Mode: AC/ CMV (Assist Control/ Continuous Mandatory Ventilation)  RR (machine): 12  TV (machine): 350  FiO2: 30  PEEP: 5  ITime: 1  MAP: 10  PIP: 29                                      ABG - ( 17 Apr 2021 02:51 )  pH, Arterial: 7.47  pH, Blood: x     /  pCO2: 36    /  pO2: 101   / HCO3: 26    / Base Excess: 2.7   /  SaO2: 99                  MEDICATIONS  (STANDING):  aMIOdarone    Tablet 200 milliGRAM(s) Oral two times a day  aspirin  chewable 81 milliGRAM(s) Oral daily  atorvastatin 40 milliGRAM(s) Oral at bedtime  BACItracin   Ointment 1 Application(s) Topical every 12 hours  chlorhexidine 0.12% Liquid 15 milliLiter(s) Oral Mucosa every 12 hours  collagenase Ointment 1 Application(s) Topical two times a day  dexMEDEtomidine Infusion 0.228 MICROgram(s)/kG/Hr (3.3 mL/Hr) IV Continuous <Continuous>  doxercalciferol Injectable 2 MICROGram(s) IV Push <User Schedule>  epoetin mana-epbx (RETACRIT) Injectable 29942 Unit(s) IV Push <User Schedule>  heparin  Infusion 1000 Unit(s)/Hr (10 mL/Hr) IV Continuous <Continuous>  lactulose Syrup 15 Gram(s) Oral three times a day  levETIRAcetam  IVPB 375 milliGRAM(s) IV Intermittent every 12 hours  levETIRAcetam  IVPB 250 milliGRAM(s) IV Intermittent <User Schedule>  pantoprazole  Injectable 40 milliGRAM(s) IV Push daily  phenylephrine    Infusion 0.1 MICROgram(s)/kG/Min (1.09 mL/Hr) IV Continuous <Continuous>  polyethylene glycol 3350 17 Gram(s) Oral at bedtime  senna 2 Tablet(s) Oral at bedtime  sertraline 50 milliGRAM(s) Oral daily  valproic  acid Syrup 250 milliGRAM(s) Oral two times a day    MEDICATIONS  (PRN):  acetaminophen   Tablet .. 650 milliGRAM(s) Oral every 6 hours PRN Temp greater or equal to 38C (100.4F)          Xrays:  TLC:  OG:  ET tube:                                                                                    stable    ECHO:  CAM ICU:

## 2021-04-17 NOTE — PROGRESS NOTE ADULT - ASSESSMENT
Impression:  ARF   cardiac arrest unknown etiology   arrythmia V tach ./ afib s/p AICD   multiple fx , hip, humerus , neck   shock ?? cardiogenic   seizure   HD   left arm dvt     PLAN:    CNS: keppra taper precedex    follow neuro sx for the collar neck fx     HEENT: oral care     PULMONARY:  do weaning trail when more awake       CARDIOVASCULAR: follow EP / ct surgery   cardiology follow up     GI: GI prophylaxis. if failed weaning trial start feed      RENAL: HD as per renal     INFECTIOUS DISEASE: off abx   repeat bld   procal     HEMATOLOGICAL:  DVT prophylaxis. hold heparin transfuse 2  unit follow h/h  send for ct abd pelvis no contrast to r/o retro bleed     ENDOCRINE:  Follow up FS.  Insulin protocol if needed.  follow orthopedics     burn to evaluate the neck ulceration from the collar keep tlc for today has left IJ , jonathan if still need tlc will place femoral   because can not place on right because on neck fracture   has teseo and new AICD will assess jonathan    Impression:  ARF   cardiac arrest unknown etiology   arrythmia V tach ./ afib s/p AICD   multiple fx , hip, humerus , neck   shock ?? cardiogenic   seizure   HD   left arm dvt     PLAN:    CNS: keppra taper precedex  do ct brain   follow neuro sx for the collar neck fx     HEENT: oral care     PULMONARY:  do weaning trail when more awake       CARDIOVASCULAR: follow EP / ct surgery   cardiology follow up     GI: GI prophylaxis. if failed weaning trial start feed      RENAL: HD as per renal     INFECTIOUS DISEASE: off abx   repeat bld   procal     HEMATOLOGICAL:  DVT prophylaxis. hold heparin transfuse 2  unit follow h/h  send for ct abd pelvis and thigh no contrast to r/o retro bleed   seriel cbc   ENDOCRINE:  Follow up FS.  Insulin protocol if needed.  follow orthopedics     burn to evaluate the neck ulceration from the collar keep tlc for today has left IJ , jonathan if still need tlc will place femoral   because can not place on right because on neck fracture   has teseo and new AICD will assess jonathan

## 2021-04-17 NOTE — PROGRESS NOTE ADULT - ASSESSMENT
Odonoid fracture, right hip/right humerus fracture - s/p fall - ?syncope related to arrhythma  ESRD - on HMD T/Thurs Sat next dialysis Tues.  CAD - is s/p CABG in past, s/p cardiac cath about one week ago  s/p AICD insertion 2 days ago      Plan:  HMD 3x/week next due on Tues.  management per CCU /cardiology/EP team  ortho f/u for fractures  MARISELA for anemia, Vit D analogues and phosphate binders for renal osteodystrophy and hyper PTH.

## 2021-04-17 NOTE — PROGRESS NOTE ADULT - ATTENDING COMMENTS
Pt. seen/ examined  Sedated/ intubated in ICU  Exam limited  Prevena functional  Distal Aquacel changed  Labs/ vitals reviewed  Decrease in H/H and increase in PT/PTT/INR noted  Getting PRBCs  Care per ICU  Will follow

## 2021-04-17 NOTE — CONSULT NOTE ADULT - ASSESSMENT
71 yo F with PMH of CAD s/p CABG (November 2019), HFrEF (EF 20%), AS, ESRD (MWF), bladder prolapse requiring chronic Castellon, Diabetes Type 2 on insulin presents from home s/p mechanical fall. When the history was taken in the emergency department, she The denied any dizziness/ palpitations/ aura/vertigo. She reported non-radiating R shoulder pain and non-radiating R hip pain on arrival. She was found to have R humeral fracture / R hip fracture / non displaced odontoid fracture/ acute rib fractures/ ? T6 vertebral fracture.     vascular is consulted for decreased signals and bluish color of toe of left extremity    plan   fu arterial duplux   wean off pressors as tolerated   no acute surgical intervention   no rule for anticoagulants     plan discussed with Dr Maldonado

## 2021-04-18 NOTE — CONSULT NOTE ADULT - ASSESSMENT
71 yo F with PMH of CAD s/p CABG (November 2019), CHF, Pleural effusion, DM type 2 Insulin dependent, ESRD on HD (MWF), uterine prolapse, chronic Castellon, bladder prolapse s/p mechanical fall with R humeral fracture / R hip fracture / non displaced odontoid fracture/ acute rib fractures. In the ED, patient arrested, ROSC achieved after 2 minutes of CPR. EKG at the time showed RBBB with wide complex tachycardia. Pt subsequently intubated and sedated, started on pressors. Pt experienced seizure-like episode during weaning trial s/p 2mg Versed. (4/2/21) Weaning trial was passed and patient was extubated, subsequently had dropping O2 sats and was reintubated.   consulted for bladder prolapse with possible necrotic ulcer.   CT A/P findings of Castellon catheter visualized within the bladder.   On PE Pt. has grade 4 uterine prolapse with pressure ulcer close to cervix.  Castellon catheter appears in urethra, draining yellow urine.      Plan:  No acute  intervention needed at this time.  Please call URO/GYN service of Dr. Delphine Rodriguez for further UROGYN management.   Cont. Current medical management as per CCU team.    attending will f/U   Case d/w Dr. Goodrich

## 2021-04-18 NOTE — CONSULT NOTE ADULT - SUBJECTIVE AND OBJECTIVE BOX
Chief Complaint: admitted for syncope and multiple fractures    HPI: Patient is currently intubated and unable to give history. History obtained from patient chart and physicians. 71 y/o F with a pmhx of CAD s/p CABG (Nov 2019), HFrEF (EF 20%), AS, ESRD (MWF), bladder prolapse requiring chronic Castellon, Diabetes Type 2 on insulin presents from home s/p mechanical fall. Trauma workup in ED significant for R humeral fracture / R hip fracture / non displaced odontoid fracture/ acute rib fractures. In the ED, patient arrested, ROSC achieved after 2 minutes of CPR. EKG at the time showed RBBB with wide complex tachycardia. Pt subsequently intubated and sedated, started on pressors. Pt experienced seizure-like episode during weaning trial s/p 2mg Versed. (4/2/21) Weaning trial passed. Patient was Extubated. O2 sat began to decrease to low 80s, tachypneic and cyanotic started on bipap with no improvement. Patient again became hypotensive and tachycardoc. Went into Afib, s/p cardioversion x3 and started on amiodarone.   Consulted for uterine prolapse with possible necrotic ulcer. "    Location -  Severity -  Quality -  Duration -  Timing -   Modifying Factors -   Associated Signs/Symptoms -     Ob/Gyn History:  G P                 LMP -                   Cycle Length -   Denies history of ovarian cysts, uterine fibroids, abnormal paps, or STIs  Last Pap Smear -   Last Mammogram -   Last Colonoscopy -        OBhx:    Denies the following: constitutional symptoms, visual symptoms, cardiovascular symptoms, respiratory symptoms, GI symptoms, musculoskeletal symptoms, skin symptoms, neurologic symptoms, hematologic symptoms, allergic symptoms, psychiatric symptoms  Except any pertinent positives listed.     Home Medications:  aspirin 81 mg oral tablet, chewable: 1 tab(s) orally once a day (01 Apr 2021 11:06)  atorvastatin 40 mg oral tablet: 1 tab(s) orally once a day (01 Apr 2021 11:06)  INSULIN LISPRO KWIKPEN  100 UNIT/ML SOPN:  (01 Apr 2021 11:06)  METOLAZONE  5 MG TABS: 1  orally once a day (01 Apr 2021 11:06)  METOPROLOL SUCCINATE ER  25 MG TB24: 1  orally 2 times a day (01 Apr 2021 11:06)  SERTRALINE HCL  50 MG TABS:  (01 Apr 2021 11:06)  SEVELAMER CARBONATE 800MG TABLETS: TK 2 TS PO TID WITH MEALS (01 Apr 2021 11:06)  TORSEMIDE 20MG TABLETS: TK 3 TS PO BID (01 Apr 2021 11:06)  VITAMIN D2 [id number] (ERGO) CAP RX: TK 1 C PO WEEKLY (01 Apr 2021 11:06)      Allergies    No Known Allergies    Intolerances        PAST MEDICAL & SURGICAL HISTORY:  Diabetes    Congestive heart failure (CHF)    Pleural effusion due to congestive heart failure    End stage renal disease  on dialysis Mon, Wed, Fr    Uterine prolapse    Chronic indwelling Castellon catheter    History of repair of hip fracture  2019    S/P CABG (coronary artery bypass graft)  11/2019    History of thoracentesis    Chronic indwelling Castellon catheter        FAMILY HISTORY:  FH: stomach cancer (Mother)    FH: myocardial infarction (Mother)        SOCIAL HISTORY: Denies cigarette use, alcohol use, or illicit drug use    Vital Signs Last 24 Hrs  T(F): 99.8 (18 Apr 2021 08:00), Max: 99.9 (17 Apr 2021 20:00)  HR: 94 (18 Apr 2021 10:00) (82 - 96)  BP: --  RR: 18 (18 Apr 2021 10:00) (12 - 20)      UO:     General Appearance - AAOx3, NAD  Heart - S1S2 regular rate and rhythm  Lung - CTA Bilaterally  Abdomen - Soft, nontender, nondistended, no rebound, no rigidity, no guarding, bowel sounds present    GYN/Pelvis:    Labia Majora - Normal  Labia Minora - Normal  Clitoris - Normal  Urethra - Normal  Vagina - Normal  Cervix - Normal    Uterus:  Size - Normal  Tenderness - None  Mass - None  Freely mobile    Adnexa:  Masses - None  Tenderness - None      Meds:   acetaminophen   Tablet .. 650 milliGRAM(s) Oral once  aMIOdarone    Tablet 200 milliGRAM(s) Oral once  calcium chloride Injectable 1000 milliGRAM(s) IV Push once  ceFAZolin   IVPB 1000 milliGRAM(s) IV Intermittent every 8 hours  cefTRIAXone   IVPB 1000 milliGRAM(s) IV Intermittent every 24 hours  digoxin  Injectable 125 MICROGram(s) IV Push once  fentaNYL    Injectable 25 MICROGram(s) IV Push once  lactated ringers Bolus 1000 milliLiter(s) IV Bolus once  levETIRAcetam  IVPB 500 milliGRAM(s) IV Intermittent once  lidocaine 2% Syringe 5 milliGRAM(s) IV Push once  magnesium sulfate  IVPB 2 Gram(s) IV Intermittent once  meropenem  IVPB 500 milliGRAM(s) IV Intermittent once  metoclopramide Injectable 10 milliGRAM(s) IV Push once  metoprolol tartrate Injectable 5 milliGRAM(s) IV Push once  metoprolol tartrate Injectable 5 milliGRAM(s) IV Push once  midazolam Injectable 2 milliGRAM(s) IV Push once  midazolam Injectable 2 milliGRAM(s) IV Push once  morphine  - Injectable 6 milliGRAM(s) IV Push Once  mupirocin 2% Ointment 1 Application(s) Topical two times a day  phenylephrine   100 mCg/mL NaCl 0.9% Injectable 60 MICROGram(s) IV Push once  potassium chloride  20 mEq/100 mL IVPB 20 milliEquivalent(s) IV Intermittent once  potassium chloride  20 mEq/100 mL IVPB 20 milliEquivalent(s) IV Intermittent once  rocuronium Injectable 100 milliGRAM(s) IV Push Once        LABS:                        8.4    11.62 )-----------( 303      ( 18 Apr 2021 04:30 )             26.7         04-18    142  |  102  |  39<H>  ----------------------------<  238<H>  3.8   |  25  |  2.9<H>    Ca    8.7      18 Apr 2021 04:30  Phos  3.2     04-18  Mg     1.9     04-18    TPro  5.1<L>  /  Alb  2.5<L>  /  TBili  0.3  /  DBili  x   /  AST  14  /  ALT  <5  /  AlkPhos  143<H>  04-18    PT/INR - ( 18 Apr 2021 04:30 )   PT: 29.20 sec;   INR: 2.54 ratio         PTT - ( 18 Apr 2021 04:30 )  PTT:32.1 sec        RADIOLOGY & ADDITIONAL STUDIES:     Chief Complaint: admitted for syncope and multiple fractures    HPI: Patient is currently intubated and unable to give history. History obtained from patient chart and physicians. Proxy currently unavailable. 69 y/o F with a pmhx of CAD s/p CABG (Nov 2019), HFrEF (EF 20%), AS, ESRD (MWF), bladder prolapse requiring chronic Castellon, Diabetes Type 2 on insulin presents from home s/p mechanical fall. Trauma workup in ED significant for R humeral fracture / R hip fracture / non displaced odontoid fracture/ acute rib fractures. In the ED, patient arrested, ROSC achieved after 2 minutes of CPR. EKG at the time showed RBBB with wide complex tachycardia. Pt subsequently intubated and sedated, started on pressors. Pt experienced seizure-like episode during weaning trial s/p 2mg Versed. (4/2/21) Weaning trial passed. Patient was Extubated. O2 sat began to decrease to low 80s, tachypneic and cyanotic started on bipap with no improvement. Patient again became hypotensive and tachycardoc. Went into Afib, s/p cardioversion x3 and started on amiodarone. Patient currently reintubated. GYN consulted for uterine prolapse with possible necrotic ulcer.     Home Medications:  aspirin 81 mg oral tablet, chewable: 1 tab(s) orally once a day (01 Apr 2021 11:06)  atorvastatin 40 mg oral tablet: 1 tab(s) orally once a day (01 Apr 2021 11:06)  INSULIN LISPRO KWIKPEN  100 UNIT/ML SOPN:  (01 Apr 2021 11:06)  METOLAZONE  5 MG TABS: 1  orally once a day (01 Apr 2021 11:06)  METOPROLOL SUCCINATE ER  25 MG TB24: 1  orally 2 times a day (01 Apr 2021 11:06)  SERTRALINE HCL  50 MG TABS:  (01 Apr 2021 11:06)  SEVELAMER CARBONATE 800MG TABLETS: TK 2 TS PO TID WITH MEALS (01 Apr 2021 11:06)  TORSEMIDE 20MG TABLETS: TK 3 TS PO BID (01 Apr 2021 11:06)  VITAMIN D2 33917CC (ERGO) CAP RX: TK 1 C PO WEEKLY (01 Apr 2021 11:06)    Allergies: No Known Allergies or Intolerances    PAST MEDICAL & SURGICAL HISTORY:  Diabetes    Congestive heart failure (CHF)    Pleural effusion due to congestive heart failure    End stage renal disease  on dialysis Mon, Wed, Fr    Uterine prolapse    Chronic indwelling Castellon catheter    History of repair of hip fracture  2019    S/P CABG (coronary artery bypass graft)  11/2019    History of thoracentesis    Chronic indwelling Castellon catheter    FAMILY HISTORY:  FH: stomach cancer (Mother)    FH: myocardial infarction (Mother)    SOCIAL HISTORY: Denies cigarette use, alcohol use, or illicit drug use    Vital Signs Last 24 Hrs  T(F): 99.8 (18 Apr 2021 08:00), Max: 99.9 (17 Apr 2021 20:00)  HR: 94 (18 Apr 2021 10:00) (82 - 96)  RR: 18 (18 Apr 2021 10:00) (12 - 20)      General Appearance - NAD  Currently Intubated    GYN/Pelvis: exam deferred at this time; will wait for healthcare proxy to be available to obtain consent for exam      Meds:   acetaminophen   Tablet .. 650 milliGRAM(s) Oral once  aMIOdarone    Tablet 200 milliGRAM(s) Oral once  calcium chloride Injectable 1000 milliGRAM(s) IV Push once  ceFAZolin   IVPB 1000 milliGRAM(s) IV Intermittent every 8 hours  cefTRIAXone   IVPB 1000 milliGRAM(s) IV Intermittent every 24 hours  digoxin  Injectable 125 MICROGram(s) IV Push once  fentaNYL    Injectable 25 MICROGram(s) IV Push once  lactated ringers Bolus 1000 milliLiter(s) IV Bolus once  levETIRAcetam  IVPB 500 milliGRAM(s) IV Intermittent once  lidocaine 2% Syringe 5 milliGRAM(s) IV Push once  magnesium sulfate  IVPB 2 Gram(s) IV Intermittent once  meropenem  IVPB 500 milliGRAM(s) IV Intermittent once  metoclopramide Injectable 10 milliGRAM(s) IV Push once  metoprolol tartrate Injectable 5 milliGRAM(s) IV Push once  metoprolol tartrate Injectable 5 milliGRAM(s) IV Push once  midazolam Injectable 2 milliGRAM(s) IV Push once  midazolam Injectable 2 milliGRAM(s) IV Push once  morphine  - Injectable 6 milliGRAM(s) IV Push Once  mupirocin 2% Ointment 1 Application(s) Topical two times a day  phenylephrine   100 mCg/mL NaCl 0.9% Injectable 60 MICROGram(s) IV Push once  potassium chloride  20 mEq/100 mL IVPB 20 milliEquivalent(s) IV Intermittent once  potassium chloride  20 mEq/100 mL IVPB 20 milliEquivalent(s) IV Intermittent once  rocuronium Injectable 100 milliGRAM(s) IV Push Once        LABS:                        8.4    11.62 )-----------( 303      ( 18 Apr 2021 04:30 )             26.7         04-18    142  |  102  |  39<H>  ----------------------------<  238<H>  3.8   |  25  |  2.9<H>    Ca    8.7      18 Apr 2021 04:30  Phos  3.2     04-18  Mg     1.9     04-18    TPro  5.1<L>  /  Alb  2.5<L>  /  TBili  0.3  /  DBili  x   /  AST  14  /  ALT  <5  /  AlkPhos  143<H>  04-18    PT/INR - ( 18 Apr 2021 04:30 )   PT: 29.20 sec;   INR: 2.54 ratio         PTT - ( 18 Apr 2021 04:30 )  PTT:32.1 sec        RADIOLOGY & ADDITIONAL STUDIES:  No relevant imaging       Chief Complaint: admitted for syncope and multiple fractures    HPI: Patient is currently intubated and unable to give history. History obtained from patient chart and physicians. Proxy currently unavailable. 71 y/o F with a pmhx of CAD s/p CABG (Nov 2019), HFrEF (EF 20%), AS, ESRD (MWF), bladder prolapse requiring chronic Castellon, Diabetes Type 2 on insulin presents from home s/p mechanical fall. Trauma workup in ED significant for R humeral fracture / R hip fracture / non displaced odontoid fracture/ acute rib fractures. In the ED, patient arrested, ROSC achieved after 2 minutes of CPR. EKG at the time showed RBBB with wide complex tachycardia. Pt subsequently intubated and sedated, started on pressors. Pt experienced seizure-like episode during weaning trial s/p 2mg Versed. (4/2/21) Weaning trial passed. Patient was Extubated. O2 sat began to decrease to low 80s, tachypneic and cyanotic started on bipap with no improvement. Patient again became hypotensive and tachycardoc. Went into Afib, s/p cardioversion x3 and started on amiodarone. Patient currently reintubated. GYN consulted for uterine prolapse with possible necrotic ulcer.     Home Medications:  aspirin 81 mg oral tablet, chewable: 1 tab(s) orally once a day (01 Apr 2021 11:06)  atorvastatin 40 mg oral tablet: 1 tab(s) orally once a day (01 Apr 2021 11:06)  INSULIN LISPRO KWIKPEN  100 UNIT/ML SOPN:  (01 Apr 2021 11:06)  METOLAZONE  5 MG TABS: 1  orally once a day (01 Apr 2021 11:06)  METOPROLOL SUCCINATE ER  25 MG TB24: 1  orally 2 times a day (01 Apr 2021 11:06)  SERTRALINE HCL  50 MG TABS:  (01 Apr 2021 11:06)  SEVELAMER CARBONATE 800MG TABLETS: TK 2 TS PO TID WITH MEALS (01 Apr 2021 11:06)  TORSEMIDE 20MG TABLETS: TK 3 TS PO BID (01 Apr 2021 11:06)  VITAMIN D2 [id number] (ERGO) CAP RX: TK 1 C PO WEEKLY (01 Apr 2021 11:06)    Allergies: No Known Allergies or Intolerances    PAST MEDICAL & SURGICAL HISTORY:  Diabetes    Congestive heart failure (CHF)    Pleural effusion due to congestive heart failure    End stage renal disease  on dialysis Mon, Wed, Fr    Uterine prolapse    Chronic indwelling Castellon catheter    History of repair of hip fracture  2019    S/P CABG (coronary artery bypass graft)  11/2019    History of thoracentesis    Chronic indwelling Castellon catheter    FAMILY HISTORY:  FH: stomach cancer (Mother)    FH: myocardial infarction (Mother)    SOCIAL HISTORY: Denies cigarette use, alcohol use, or illicit drug use    Vital Signs Last 24 Hrs  T(F): 99.8 (18 Apr 2021 08:00), Max: 99.9 (17 Apr 2021 20:00)  HR: 94 (18 Apr 2021 10:00) (82 - 96)  RR: 18 (18 Apr 2021 10:00) (12 - 20)      General Appearance - NAD  Currently Intubated    GYN/Pelvis: complete exam deferred at this time; will wait for healthcare proxy to be available to obtain consent for exam; stage 4 prolapse visible      Meds:   acetaminophen   Tablet .. 650 milliGRAM(s) Oral once  aMIOdarone    Tablet 200 milliGRAM(s) Oral once  calcium chloride Injectable 1000 milliGRAM(s) IV Push once  ceFAZolin   IVPB 1000 milliGRAM(s) IV Intermittent every 8 hours  cefTRIAXone   IVPB 1000 milliGRAM(s) IV Intermittent every 24 hours  digoxin  Injectable 125 MICROGram(s) IV Push once  fentaNYL    Injectable 25 MICROGram(s) IV Push once  lactated ringers Bolus 1000 milliLiter(s) IV Bolus once  levETIRAcetam  IVPB 500 milliGRAM(s) IV Intermittent once  lidocaine 2% Syringe 5 milliGRAM(s) IV Push once  magnesium sulfate  IVPB 2 Gram(s) IV Intermittent once  meropenem  IVPB 500 milliGRAM(s) IV Intermittent once  metoclopramide Injectable 10 milliGRAM(s) IV Push once  metoprolol tartrate Injectable 5 milliGRAM(s) IV Push once  metoprolol tartrate Injectable 5 milliGRAM(s) IV Push once  midazolam Injectable 2 milliGRAM(s) IV Push once  midazolam Injectable 2 milliGRAM(s) IV Push once  morphine  - Injectable 6 milliGRAM(s) IV Push Once  mupirocin 2% Ointment 1 Application(s) Topical two times a day  phenylephrine   100 mCg/mL NaCl 0.9% Injectable 60 MICROGram(s) IV Push once  potassium chloride  20 mEq/100 mL IVPB 20 milliEquivalent(s) IV Intermittent once  potassium chloride  20 mEq/100 mL IVPB 20 milliEquivalent(s) IV Intermittent once  rocuronium Injectable 100 milliGRAM(s) IV Push Once        LABS:                        8.4    11.62 )-----------( 303      ( 18 Apr 2021 04:30 )             26.7         04-18    142  |  102  |  39<H>  ----------------------------<  238<H>  3.8   |  25  |  2.9<H>    Ca    8.7      18 Apr 2021 04:30  Phos  3.2     04-18  Mg     1.9     04-18    TPro  5.1<L>  /  Alb  2.5<L>  /  TBili  0.3  /  DBili  x   /  AST  14  /  ALT  <5  /  AlkPhos  143<H>  04-18    PT/INR - ( 18 Apr 2021 04:30 )   PT: 29.20 sec;   INR: 2.54 ratio         PTT - ( 18 Apr 2021 04:30 )  PTT:32.1 sec        RADIOLOGY & ADDITIONAL STUDIES:  No relevant imaging       Chief Complaint: admitted for syncope and multiple fractures    HPI: Patient is currently intubated and unable to give history. History obtained from patient chart and physicians. Proxy currently unavailable. 69yo with a PMHx of CAD s/p CABG (Nov 2019), HFrEF (EF 20%), AS, ESRD (MWF), bladder prolapse requiring chronic Castellon, T2DM on insulin, s/p mechanical fall with R humeral fracture / R hip fracture / non displaced odontoid fracture/ acute rib fractures. In the ED, patient arrested, ROSC achieved after 2 minutes of CPR. EKG at the time showed RBBB with wide complex tachycardia. Pt subsequently intubated and sedated, started on pressors. Pt experienced seizure-like episode during weaning trial s/p 2mg Versed. (4/2/21) Weaning trial was passed and patient was extubated, subsequently had dropping O2 sats and was reintubated. Patient currently intubated. GYN consulted for uterine prolapse with possible necrotic ulcer.     Home Medications:  aspirin 81 mg oral tablet, chewable: 1 tab(s) orally once a day (01 Apr 2021 11:06)  atorvastatin 40 mg oral tablet: 1 tab(s) orally once a day (01 Apr 2021 11:06)  INSULIN LISPRO KWIKPEN  100 UNIT/ML SOPN:  (01 Apr 2021 11:06)  METOLAZONE  5 MG TABS: 1  orally once a day (01 Apr 2021 11:06)  METOPROLOL SUCCINATE ER  25 MG TB24: 1  orally 2 times a day (01 Apr 2021 11:06)  SERTRALINE HCL  50 MG TABS:  (01 Apr 2021 11:06)  SEVELAMER CARBONATE 800MG TABLETS: TK 2 TS PO TID WITH MEALS (01 Apr 2021 11:06)  TORSEMIDE 20MG TABLETS: TK 3 TS PO BID (01 Apr 2021 11:06)  VITAMIN D2 [id number] (ERGO) CAP RX: TK 1 C PO WEEKLY (01 Apr 2021 11:06)    Allergies: No Known Allergies or Intolerances    PAST MEDICAL & SURGICAL HISTORY:  Diabetes    Congestive heart failure (CHF)    Pleural effusion due to congestive heart failure    End stage renal disease  on dialysis Mon, Wed, Fr    Uterine prolapse    Chronic indwelling Castellon catheter    History of repair of hip fracture  2019    S/P CABG (coronary artery bypass graft)  11/2019    History of thoracentesis    Chronic indwelling Castellon catheter    FAMILY HISTORY:  FH: stomach cancer (Mother)    FH: myocardial infarction (Mother)    SOCIAL HISTORY: Denies cigarette use, alcohol use, or illicit drug use    Vital Signs Last 24 Hrs  T(F): 99.8 (18 Apr 2021 08:00), Max: 99.9 (17 Apr 2021 20:00)  HR: 94 (18 Apr 2021 10:00) (82 - 96)  RR: 18 (18 Apr 2021 10:00) (12 - 20)      General Appearance - NAD  Currently Intubated    GYN/Pelvis:   complete exam deferred at this time; will wait for healthcare proxy to be available to obtain consent for exam;   stage 4 prolapse visible, no active bleeding      Meds:   acetaminophen   Tablet .. 650 milliGRAM(s) Oral once  aMIOdarone    Tablet 200 milliGRAM(s) Oral once  calcium chloride Injectable 1000 milliGRAM(s) IV Push once  ceFAZolin   IVPB 1000 milliGRAM(s) IV Intermittent every 8 hours  cefTRIAXone   IVPB 1000 milliGRAM(s) IV Intermittent every 24 hours  digoxin  Injectable 125 MICROGram(s) IV Push once  fentaNYL    Injectable 25 MICROGram(s) IV Push once  lactated ringers Bolus 1000 milliLiter(s) IV Bolus once  levETIRAcetam  IVPB 500 milliGRAM(s) IV Intermittent once  lidocaine 2% Syringe 5 milliGRAM(s) IV Push once  magnesium sulfate  IVPB 2 Gram(s) IV Intermittent once  meropenem  IVPB 500 milliGRAM(s) IV Intermittent once  metoclopramide Injectable 10 milliGRAM(s) IV Push once  metoprolol tartrate Injectable 5 milliGRAM(s) IV Push once  metoprolol tartrate Injectable 5 milliGRAM(s) IV Push once  midazolam Injectable 2 milliGRAM(s) IV Push once  midazolam Injectable 2 milliGRAM(s) IV Push once  morphine  - Injectable 6 milliGRAM(s) IV Push Once  mupirocin 2% Ointment 1 Application(s) Topical two times a day  phenylephrine   100 mCg/mL NaCl 0.9% Injectable 60 MICROGram(s) IV Push once  potassium chloride  20 mEq/100 mL IVPB 20 milliEquivalent(s) IV Intermittent once  potassium chloride  20 mEq/100 mL IVPB 20 milliEquivalent(s) IV Intermittent once  rocuronium Injectable 100 milliGRAM(s) IV Push Once        LABS:                        8.4    11.62 )-----------( 303      ( 18 Apr 2021 04:30 )             26.7         04-18    142  |  102  |  39<H>  ----------------------------<  238<H>  3.8   |  25  |  2.9<H>    Ca    8.7      18 Apr 2021 04:30  Phos  3.2     04-18  Mg     1.9     04-18    TPro  5.1<L>  /  Alb  2.5<L>  /  TBili  0.3  /  DBili  x   /  AST  14  /  ALT  <5  /  AlkPhos  143<H>  04-18    PT/INR - ( 18 Apr 2021 04:30 )   PT: 29.20 sec;   INR: 2.54 ratio         PTT - ( 18 Apr 2021 04:30 )  PTT:32.1 sec        RADIOLOGY & ADDITIONAL STUDIES:  No relevant imaging       Chief Complaint: admitted for syncope and multiple fractures    HPI: Patient is currently intubated and unable to give history. History obtained from patient chart and physicians. Proxy currently unavailable. 71yo with a PMHx of CAD s/p CABG (Nov 2019), HFrEF (EF 20%), AS, ESRD (MWF), bladder prolapse requiring chronic Castellon, T2DM on insulin, s/p mechanical fall with R humeral fracture / R hip fracture / non displaced odontoid fracture/ acute rib fractures. In the ED, patient arrested, ROSC achieved after 2 minutes of CPR. EKG at the time showed RBBB with wide complex tachycardia. Pt subsequently intubated and sedated, started on pressors. Pt experienced seizure-like episode during weaning trial s/p 2mg Versed. (4/2/21) Weaning trial was passed and patient was extubated, subsequently had dropping O2 sats and was reintubated. Patient currently intubated. GYN consulted for uterine prolapse with possible necrotic ulcer.     Home Medications:  aspirin 81 mg oral tablet, chewable: 1 tab(s) orally once a day (01 Apr 2021 11:06)  atorvastatin 40 mg oral tablet: 1 tab(s) orally once a day (01 Apr 2021 11:06)  INSULIN LISPRO KWIKPEN  100 UNIT/ML SOPN:  (01 Apr 2021 11:06)  METOLAZONE  5 MG TABS: 1  orally once a day (01 Apr 2021 11:06)  METOPROLOL SUCCINATE ER  25 MG TB24: 1  orally 2 times a day (01 Apr 2021 11:06)  SERTRALINE HCL  50 MG TABS:  (01 Apr 2021 11:06)  SEVELAMER CARBONATE 800MG TABLETS: TK 2 TS PO TID WITH MEALS (01 Apr 2021 11:06)  TORSEMIDE 20MG TABLETS: TK 3 TS PO BID (01 Apr 2021 11:06)  VITAMIN D2 [id number] (ERGO) CAP RX: TK 1 C PO WEEKLY (01 Apr 2021 11:06)    Allergies: No Known Allergies or Intolerances    PAST MEDICAL & SURGICAL HISTORY:  Diabetes    Congestive heart failure (CHF)    Pleural effusion due to congestive heart failure    End stage renal disease  on dialysis Mon, Wed, Fr    Uterine prolapse    Chronic indwelling Castellon catheter    History of repair of hip fracture  2019    S/P CABG (coronary artery bypass graft)  11/2019    History of thoracentesis    Chronic indwelling Castellon catheter    FAMILY HISTORY:  FH: stomach cancer (Mother)    FH: myocardial infarction (Mother)    SOCIAL HISTORY: Denies cigarette use, alcohol use, or illicit drug use    Vital Signs Last 24 Hrs  T(F): 99.8 (18 Apr 2021 08:00), Max: 99.9 (17 Apr 2021 20:00)  HR: 94 (18 Apr 2021 10:00) (82 - 96)  RR: 18 (18 Apr 2021 10:00) (12 - 20)      General Appearance - NAD  Currently Intubated    GYN/Pelvis:   complete exam deferred at this time; will wait for healthcare proxy to be available to obtain consent for exam;   stage 4 prolapse visible, no necrosis visible on brief exam      Meds:   acetaminophen   Tablet .. 650 milliGRAM(s) Oral once  aMIOdarone    Tablet 200 milliGRAM(s) Oral once  calcium chloride Injectable 1000 milliGRAM(s) IV Push once  ceFAZolin   IVPB 1000 milliGRAM(s) IV Intermittent every 8 hours  cefTRIAXone   IVPB 1000 milliGRAM(s) IV Intermittent every 24 hours  digoxin  Injectable 125 MICROGram(s) IV Push once  fentaNYL    Injectable 25 MICROGram(s) IV Push once  lactated ringers Bolus 1000 milliLiter(s) IV Bolus once  levETIRAcetam  IVPB 500 milliGRAM(s) IV Intermittent once  lidocaine 2% Syringe 5 milliGRAM(s) IV Push once  magnesium sulfate  IVPB 2 Gram(s) IV Intermittent once  meropenem  IVPB 500 milliGRAM(s) IV Intermittent once  metoclopramide Injectable 10 milliGRAM(s) IV Push once  metoprolol tartrate Injectable 5 milliGRAM(s) IV Push once  metoprolol tartrate Injectable 5 milliGRAM(s) IV Push once  midazolam Injectable 2 milliGRAM(s) IV Push once  midazolam Injectable 2 milliGRAM(s) IV Push once  morphine  - Injectable 6 milliGRAM(s) IV Push Once  mupirocin 2% Ointment 1 Application(s) Topical two times a day  phenylephrine   100 mCg/mL NaCl 0.9% Injectable 60 MICROGram(s) IV Push once  potassium chloride  20 mEq/100 mL IVPB 20 milliEquivalent(s) IV Intermittent once  potassium chloride  20 mEq/100 mL IVPB 20 milliEquivalent(s) IV Intermittent once  rocuronium Injectable 100 milliGRAM(s) IV Push Once        LABS:                        8.4    11.62 )-----------( 303      ( 18 Apr 2021 04:30 )             26.7         04-18    142  |  102  |  39<H>  ----------------------------<  238<H>  3.8   |  25  |  2.9<H>    Ca    8.7      18 Apr 2021 04:30  Phos  3.2     04-18  Mg     1.9     04-18    TPro  5.1<L>  /  Alb  2.5<L>  /  TBili  0.3  /  DBili  x   /  AST  14  /  ALT  <5  /  AlkPhos  143<H>  04-18    PT/INR - ( 18 Apr 2021 04:30 )   PT: 29.20 sec;   INR: 2.54 ratio         PTT - ( 18 Apr 2021 04:30 )  PTT:32.1 sec        RADIOLOGY & ADDITIONAL STUDIES:  No relevant imaging

## 2021-04-18 NOTE — PROGRESS NOTE ADULT - CRITICAL CARE SERVICES PROVIDED
Critical care services provided

## 2021-04-18 NOTE — PROGRESS NOTE ADULT - ASSESSMENT
Odonoid fracture, right hip/right humerus fracture today POD#3 - s/p fall - ?syncope related to arrhythmia  ESRD - on HMD T/Thurs Sat next dialysis Tues.  CAD - is s/p CABG in past, s/p cardiac cath about one week ago  s/p AICD insertion 2 days ago      Plan:  HMD 3x/week next due on Tues.  management per CCU /cardiology/EP team  ortho f/u for fractures  MARISELA for anemia, Vit D analogues and phosphate binders for renal osteodystrophy and hyper PTH.

## 2021-04-18 NOTE — CONSULT NOTE ADULT - ASSESSMENT
69yo postmenopausal with multiple medical comorbidities, s/p cardiac arrest and ROSC, currently intubated with stage 4 uterine prolapse, pending full GYN exam, currently clinically and hemodynamically stable.    -GYN pelvic exam pending healthcare proxy consent  -Medical management per primary team    Dr. Hernandez and Dr. Hooper to be made aware 71yo postmenopausal with multiple medical comorbidities, s/p cardiac arrest and ROSC, Covid pos on 4/6, currently intubated with stage 4 uterine prolapse, pending full GYN exam, currently clinically and hemodynamically stable.    -GYN pelvic exam pending healthcare proxy consent  -Medical management per primary team    Dr. Hernandez and Dr. Hooper to be made aware 71yo postmenopausal with multiple medical comorbidities, s/p cardiac arrest and ROSC, Covid pos on 4/6, currently intubated with stage 4 uterine prolapse, currently clinically and hemodynamically stable.    -if no change to urination or defecation, no need for acute GYN intervention  -encourage proper hygiene of prolapsed organ  -GYN pelvic exam pending healthcare proxy consent  -Medical management per primary team    Dr. Hernandez and Dr. Hooper aware 69yo postmenopausal with multiple medical comorbidities, s/p cardiac arrest and ROSC, Covid pos on 4/2, currently intubated with stage 4 uterine prolapse, currently clinically and hemodynamically stable.    -if no change to urination or defecation, no need for acute GYN intervention  -encourage proper hygiene of prolapsed organ  -GYN pelvic exam pending healthcare proxy consent  -Medical management per primary team    Dr. Hernandez and Dr. Hooper aware

## 2021-04-18 NOTE — PROGRESS NOTE ADULT - SUBJECTIVE AND OBJECTIVE BOX
Progress Note: General Surgery  Patient: GUS WILBURN , 70y (1950)Female   MRN: [id number]  Location: [address]  Visit: 04-01-21 Inpatient  Date: 04-18-21 @ 19:47    Admit Diagnosis/Chief Complaint: Personal history of sudden cardiac death (SCD) resuscitated    Cardiomyopathy    Afib    Hip fracture, right        Procedure/Diagnosis: Afib    Cardiomyopathy    Personal history of sudden cardiac death (SCD) resuscitated    Hip fracture, right     S/P Implantation of biventricular defibrillator    Intramedullary rodding of fracture of right femur        Events/ 24h: Patient seen and examined at bedside. No acute events overnight. Pain controlled. Afebrile, VSS.    Vitals: T(F): 98.9 (04-18-21 @ 16:00), Max: 99.9 (04-17-21 @ 20:00)  HR: 102 (04-18-21 @ 18:00)  BP: --  RR: 70 (04-18-21 @ 18:00)  SpO2: 99% (04-18-21 @ 18:00)  RR (machine): 12, TV (machine): 350, FiO2: 30, PEEP: 5  In:   04-17-21 @ 07:01  -  04-18-21 @ 07:00  --------------------------------------------------------  IN: 1838.3 mL    04-18-21 @ 07:01  -  04-18-21 @ 19:47  --------------------------------------------------------  IN: 995.2 mL      Out:   04-17-21 @ 07:01  -  04-18-21 @ 07:00  --------------------------------------------------------  OUT:    Indwelling Catheter - Urethral (mL): 20 mL    Other (mL): 2000 mL  Total OUT: 2020 mL      04-18-21 @ 07:01  -  04-18-21 @ 19:47  --------------------------------------------------------  OUT:    Indwelling Catheter - Urethral (mL): 10 mL  Total OUT: 10 mL        Net:   04-17-21 @ 07:01  -  04-18-21 @ 07:00  --------------------------------------------------------  NET: -181.7 mL    04-18-21 @ 07:01  -  04-18-21 @ 19:47  --------------------------------------------------------  NET: 985.2 mL        Diet: Diet, NPO with Tube Feed:   Tube Feeding Modality: Orogastric  Peptamen A.F. Formula  Total Volume for 24 Hours (mL): 1080  Continuous  Until Goal Tube Feed Rate (mL per Hour): 45  Tube Feed Duration (in Hours): 24  Tube Feed Start Time: 19:00  Chris(7 Gm Arginine/7 Gm Glut/1.2 Gm HMB     Qty per Day:  2 (04-16-21 @ 20:56)    IV Fluids: dextrose 5%. 1000 milliLiter(s) (100 mL/Hr) IV Continuous <Continuous>  dextrose 5%. 1000 milliLiter(s) (50 mL/Hr) IV Continuous <Continuous>  doxercalciferol Injectable 2 MICROGram(s) IV Push <User Schedule>      Physical Examination:    Constitutional: intubated, on light sedation  HEENT: NC/AT, opens eyes spontaneously   Respiratory: mechanically ventilated   Card: + S1/S2   Abd: Soft, NT/ND   : + severe uterine prolapse with area of friable and necrotic tissue close to cervix, 14 Fr Castellno catheter appears to be in urethra drains yellow urine.   Extremities: no edema  RLE  Prevena dressing intact with good seal  Distal aquacel in place      Medications: [Standing]  aMIOdarone    Tablet 200 milliGRAM(s) Oral two times a day  aspirin  chewable 81 milliGRAM(s) Oral daily  atorvastatin 40 milliGRAM(s) Oral at bedtime  BACItracin   Ointment 1 Application(s) Topical every 12 hours  chlorhexidine 0.12% Liquid 15 milliLiter(s) Oral Mucosa every 12 hours  collagenase Ointment 1 Application(s) Topical two times a day  dexMEDEtomidine Infusion 0.228 MICROgram(s)/kG/Hr (3.3 mL/Hr) IV Continuous <Continuous>  dextrose 40% Gel 15 Gram(s) Oral once  dextrose 5%. 1000 milliLiter(s) (50 mL/Hr) IV Continuous <Continuous>  dextrose 5%. 1000 milliLiter(s) (100 mL/Hr) IV Continuous <Continuous>  dextrose 50% Injectable 25 Gram(s) IV Push once  dextrose 50% Injectable 12.5 Gram(s) IV Push once  dextrose 50% Injectable 25 Gram(s) IV Push once  doxercalciferol Injectable 2 MICROGram(s) IV Push <User Schedule>  epoetin mana-epbx (RETACRIT) Injectable 25547 Unit(s) IV Push <User Schedule>  fentaNYL   Patch  25 MICROgram(s)/Hr 1 Patch Transdermal every 72 hours  glucagon  Injectable 1 milliGRAM(s) IntraMuscular once  insulin lispro (ADMELOG) corrective regimen sliding scale   SubCutaneous Before meals and at bedtime  lactulose Syrup 15 Gram(s) Oral three times a day  levETIRAcetam  IVPB 375 milliGRAM(s) IV Intermittent every 12 hours  levETIRAcetam  IVPB 250 milliGRAM(s) IV Intermittent <User Schedule>  pantoprazole  Injectable 40 milliGRAM(s) IV Push daily  phenylephrine    Infusion 0.1 MICROgram(s)/kG/Min (1.09 mL/Hr) IV Continuous <Continuous>  polyethylene glycol 3350 17 Gram(s) Oral at bedtime  senna 2 Tablet(s) Oral at bedtime  sertraline 50 milliGRAM(s) Oral daily  valproic  acid Syrup 250 milliGRAM(s) Oral two times a day    DVT Prophylaxis:   GI Prophylaxis: pantoprazole  Injectable 40 milliGRAM(s) IV Push daily    Antibiotics:   Anticoagulation:   Medications:[PRN]  acetaminophen   Tablet .. 650 milliGRAM(s) Oral every 6 hours PRN      Labs:                        8.0    11.25 )-----------( 306      ( 18 Apr 2021 16:00 )             24.6     04-18    142  |  102  |  39<H>  ----------------------------<  238<H>  3.8   |  25  |  2.9<H>    Ca    8.7      18 Apr 2021 04:30  Phos  3.2     04-18  Mg     1.9     04-18    TPro  5.1<L>  /  Alb  2.5<L>  /  TBili  0.3  /  DBili  x   /  AST  14  /  ALT  <5  /  AlkPhos  143<H>  04-18    LIVER FUNCTIONS - ( 18 Apr 2021 04:30 )  Alb: 2.5 g/dL / Pro: 5.1 g/dL / ALK PHOS: 143 U/L / ALT: <5 U/L / AST: 14 U/L / GGT: x           PT/INR - ( 18 Apr 2021 04:30 )   PT: 29.20 sec;   INR: 2.54 ratio         PTT - ( 18 Apr 2021 04:30 )  PTT:32.1 sec  ABG - ( 18 Apr 2021 13:19 )  pH: 7.45  /  pCO2: 43    /  pO2: 126   / HCO3: 30    / Base Excess: 5.2   /  SaO2: 99

## 2021-04-18 NOTE — CONSULT NOTE ADULT - SUBJECTIVE AND OBJECTIVE BOX
Patient is a 70y old  Female who presents with a chief complaint of syncope and fracture (17 Apr 2021 16:37)      HPI:  71 yo F with PMH of CAD s/p CABG (November 2019), CHF, Pleural effusion, DM type 2 Insulin dependent, ESRD on HD (MWF), uterine prolapse, chronic Castellon, bladder prolapse s/p mechanical fall with R humeral fracture / R hip fracture / non displaced odontoid fracture/ acute rib fractures. In the ED, patient arrested, ROSC achieved after 2 minutes of CPR. EKG at the time showed RBBB with wide complex tachycardia. Pt subsequently intubated and sedated, started on pressors. Pt experienced seizure-like episode during weaning trial s/p 2mg Versed. (4/2/21) Weaning trial was passed and patient was extubated, subsequently had dropping O2 sats and was reintubated.   consulted for bladder prolapse with possible necrotic ulcer.   Patient currently intubated, unable to provide medical history. All medical history obtained from medical records.     PAST MEDICAL & SURGICAL HISTORY:  Diabetes    Congestive heart failure (CHF)    Pleural effusion due to congestive heart failure    End stage renal disease  on dialysis Mon, Wed, Fr    Uterine prolapse    Chronic indwelling Castellon catheter    History of repair of hip fracture  2019    S/P CABG (coronary artery bypass graft)  11/2019    History of thoracentesis    Chronic indwelling Castellon catheter      REVIEW OF SYSTEMS:    unable to obtain JARED Pt. is intubated    MEDICATIONS  (STANDING):  aMIOdarone    Tablet 200 milliGRAM(s) Oral two times a day  aspirin  chewable 81 milliGRAM(s) Oral daily  atorvastatin 40 milliGRAM(s) Oral at bedtime  BACItracin   Ointment 1 Application(s) Topical every 12 hours  chlorhexidine 0.12% Liquid 15 milliLiter(s) Oral Mucosa every 12 hours  collagenase Ointment 1 Application(s) Topical two times a day  dexMEDEtomidine Infusion 0.228 MICROgram(s)/kG/Hr (3.3 mL/Hr) IV Continuous <Continuous>  dextrose 40% Gel 15 Gram(s) Oral once  dextrose 5%. 1000 milliLiter(s) (100 mL/Hr) IV Continuous <Continuous>  dextrose 5%. 1000 milliLiter(s) (50 mL/Hr) IV Continuous <Continuous>  dextrose 50% Injectable 25 Gram(s) IV Push once  dextrose 50% Injectable 12.5 Gram(s) IV Push once  dextrose 50% Injectable 25 Gram(s) IV Push once  doxercalciferol Injectable 2 MICROGram(s) IV Push <User Schedule>  epoetin mana-epbx (RETACRIT) Injectable 05354 Unit(s) IV Push <User Schedule>  fentaNYL   Patch  25 MICROgram(s)/Hr 1 Patch Transdermal every 72 hours  glucagon  Injectable 1 milliGRAM(s) IntraMuscular once  insulin lispro (ADMELOG) corrective regimen sliding scale   SubCutaneous Before meals and at bedtime  lactulose Syrup 15 Gram(s) Oral three times a day  levETIRAcetam  IVPB 375 milliGRAM(s) IV Intermittent every 12 hours  levETIRAcetam  IVPB 250 milliGRAM(s) IV Intermittent <User Schedule>  pantoprazole  Injectable 40 milliGRAM(s) IV Push daily  phenylephrine    Infusion 0.1 MICROgram(s)/kG/Min (1.09 mL/Hr) IV Continuous <Continuous>  polyethylene glycol 3350 17 Gram(s) Oral at bedtime  senna 2 Tablet(s) Oral at bedtime  sertraline 50 milliGRAM(s) Oral daily  valproic  acid Syrup 250 milliGRAM(s) Oral two times a day    MEDICATIONS  (PRN):  acetaminophen   Tablet .. 650 milliGRAM(s) Oral every 6 hours PRN Temp greater or equal to 38C (100.4F)      Allergies: NKDA       SOCIAL HISTORY:  unable to obtain social hx Pt. is intubated    FAMILY HISTORY:  FH: stomach cancer (Mother)    FH: myocardial infarction (Mother)      Vital Signs Last 24 Hrs  T(C): 36.7 (18 Apr 2021 12:00), Max: 37.7 (17 Apr 2021 20:00)  T(F): 98.1 (18 Apr 2021 12:00), Max: 99.9 (17 Apr 2021 20:00)  HR: 94 (18 Apr 2021 12:00) (82 - 104)  RR: 15 (18 Apr 2021 12:00) (12 - 19)  SpO2: 100% (18 Apr 2021 12:00) (100% - 100%)    PHYSICAL EXAM:    Constitutional: intubated, on light sedation  HEENT: NC/AT, opens eyes spontaneously   Respiratory: mechanically ventilated   Card: + S1/S2   Abd: Soft, NT/ND   : + severe uterine prolapse with area of friable and necrotic tissue close to cervix, 14 Fr Castellon catheter appears to be in urethra drains yellow urine.   Extremities: no edema         I&O's Summary    17 Apr 2021 07:01  -  18 Apr 2021 07:00  --------------------------------------------------------  IN: 1838.3 mL / OUT: 2020 mL / NET: -181.7 mL    18 Apr 2021 07:01  -  18 Apr 2021 14:05  --------------------------------------------------------  IN: 467 mL / OUT: 5 mL / NET: 462 mL        LABS:                        8.4    11.62 )-----------( 303      ( 18 Apr 2021 04:30 )             26.7     04-18    142  |  102  |  39<H>  ----------------------------<  238<H>  3.8   |  25  |  2.9<H>    Ca    8.7      18 Apr 2021 04:30  Phos  3.2     04-18  Mg     1.9     04-18    TPro  5.1<L>  /  Alb  2.5<L>  /  TBili  0.3  /  DBili  x   /  AST  14  /  ALT  <5  /  AlkPhos  143<H>  04-18    PT/INR - ( 18 Apr 2021 04:30 )   PT: 29.20 sec;   INR: 2.54 ratio         PTT - ( 18 Apr 2021 04:30 )  PTT:32.1 sec     Urinalysis (04.02.21 @ 10:44)    Blood, Urine: Moderate    Glucose Qualitative, Urine: Negative    pH Urine: 7.0    Color: Yellow    Urine Appearance: Turbid    Bilirubin: Negative    Ketone - Urine: Negative    Specific Gravity: 1.027    Protein, Urine: 100 mg/dL    Urobilinogen: <2 mg/dL    Nitrite: Negative    Leukocyte Esterase Concentration: Large    Urine Microscopic-Add On (NC) (04.02.21 @ 10:44)    Bacteria: Moderate    Epithelial Cells: 3 /HPF    Red Blood Cell - Urine: 14 /HPF    White Blood Cell - Urine: >720 /HPF    Hyaline Casts: 16 /LPF    Culture - Urine (04.02.21 @ 10:44)    Specimen Source: .Urine Catheterized    Culture Results:   >=3 organisms. Probable collection contamination.        RADIOLOGY & ADDITIONAL STUDIES:     < from: CT Abdomen and Pelvis No Cont (04.17.21 @ 15:47) >    EXAM:  CT ABDOMEN AND PELVIS            PROCEDURE DATE:  04/17/2021            INTERPRETATION:  CLINICAL STATEMENT: Evaluate for retroperitoneal hematoma.    TECHNIQUE: Contiguous axial CT images were obtained from the lower chest to the pubic symphysis without intravenous contrast.  Oral contrast was not administered.  Reformatted images in the coronal and sagittal planes were acquired.    COMPARISON CT: CT abdomen pelvis 4/1/2021    OTHER STUDIES USED FOR CORRELATION: None.      FINDINGS:    LOWER CHEST: Unchanged left lower lobe pleural effusion with adjacent atelectasis. Partially visualized cardiac pacing wires.    HEPATOBILIARY: Cholelithiasis. Mild intrahepatic biliary ductal dilation. Changed right hepatic lobe 1.7 cm cyst.    SPLEEN: Unchanged 1.6 cm splenic artery aneurysm with rim calcification.    PANCREAS: Unremarkable.    ADRENAL GLANDS: Unremarkable.    KIDNEYS: No hydronephrosis..    ABDOMINOPELVIC NODES: Unremarkable.    PELVIC ORGANS: Castellon catheter visualized within the bladder.    PERITONEUM/MESENTERY/BOWEL: Enteric tube visualized within the stomach. Unchanged small volume ascites. No evidence of bowel obstruction or pneumoperitoneum. Residual contrast visualized within the colon.    BONES/SOFT TISSUES: Status post tonie and screw fixation of comminuted right intertrochanteric fracture with surrounding postoperative changes including punctate foci of free air and fat stranding. Within the right gluteus ziggy/medius muscles there is a 4.4 x 9.4 x 12.8 cm focal loculated fluid collection measuring complex fluid. Retroperitoneal extension along the right posterior pelvic sidewall Surgical staples overlie the right hip Diffuse soft tissue anasarca. Osteopenia. No additional fractures visualized.    OTHER: Diffuse vascular calcification.      IMPRESSION:  1.  Status post tonie and screw fixation of comminuted right intertrochanteric fracture with surrounding postoperative changes.  2.  Right gluteus ziggy/medius intramuscular loculated 12.8 cm fluid collection measuring complex fluid with areas of increased density and retroperitoneal extension along the right posterior pelvic sidewall. Findings may reflect postoperative seroma and developing hematoma.            LAKSHMI FREITAS M.D., RESIDENT RADIOLOGIST  This document has been electronically signed.  PAULINE CAMPO MD; Attending Radiologist  This document has been electronically signed. Apr 17 2021  4:35PM    < end of copied text >

## 2021-04-18 NOTE — PROGRESS NOTE ADULT - ASSESSMENT
Assessment:  71 yo F with PMH of CAD s/p CABG (November 2019), HFrEF (EF 20%), AS, ESRD (MWF), bladder prolapse requiring chronic Castellon, Diabetes Type 2 on insulin presents from home s/p mechanical fall.    vascular is consulted for decreased signals and bluish color of toe of left extremity  Patient seen and examined at bedside. NAD.     Plan:  - wean off pressors as tolerated   - no acute surgical intervention   - no role for anticoagulants   - duplex noted, moderate stenosis but on physical exam foot is warm and well perfused, discoloration is resolving  - will sign off  - recall PRN     Spectra [id number]    Date/Time: 04-18-21 @ 19:47

## 2021-04-18 NOTE — PROGRESS NOTE ADULT - SUBJECTIVE AND OBJECTIVE BOX
ORTHO PROGRESS NOTE    Interval History:  Patient seen and examined at bedside.  Unable to comply with exam due to baseline mental status.  Dressings clean.  Prevena functioning.    Vital Signs Last 24 Hrs  T(C): 37.6 (18 Apr 2021 04:00), Max: 37.7 (17 Apr 2021 20:00)  T(F): 99.6 (18 Apr 2021 04:00), Max: 99.9 (17 Apr 2021 20:00)  HR: 84 (18 Apr 2021 06:00) (82 - 106)  RR: 12 (18 Apr 2021 06:00) (12 - 23)  SpO2: 100% (18 Apr 2021 06:00) (97% - 100%)    Physical Exam:   Dressing C/D/I  Prevena functioning  Unable to assess sensation or motor  CR<2sec, palpable pulses                   8.4    11.62 )-----------( 303      ( 18 Apr 2021 04:30 )             26.7     142  |  102  |  39<H>  ----------------------------<  238<H>  3.8   |  25  |  2.9<H>    Ca    8.7      18 Apr 2021 04:30  Phos  3.2     04-18  Mg     1.9     04-18  TPro  5.1<L>  /  Alb  2.5<L>  /  TBili  0.3  /  DBili  x   /  AST  14  /  ALT  <5  /  AlkPhos  143<H>  04-18  PT/INR - ( 18 Apr 2021 04:30 )   PT: 29.20 sec;   INR: 2.54 ratio    PTT - ( 18 Apr 2021 04:30 )  PTT:32.1 sec    A/P: 70F POD #3 right hip ORIF.    WBAT on RLE  NWB RUE (proximal humerus fracture)  DVT ppx: SCD, ASA ordered. Last INR 2.54  Pain control  Home medications: resume  HGB 8.4. Continue trend labs: Transfuse PRN  Rest of management per primary  Dispo: Pending PT recommendations

## 2021-04-18 NOTE — PROGRESS NOTE ADULT - SUBJECTIVE AND OBJECTIVE BOX
patient seen and examined in CCU.  today patient opens eyes and is able to follow commands  patient on phenylephrine drip. [residex off.    Vital Signs Last 24 Hrs  T(C): 36.7 (18 Apr 2021 12:00), Max: 37.7 (17 Apr 2021 20:00)  T(F): 98.1 (18 Apr 2021 12:00), Max: 99.9 (17 Apr 2021 20:00)  HR: 94 (18 Apr 2021 12:00) (82 - 104)-  RR: 15 (18 Apr 2021 12:00) (12 - 19)  SpO2: 100% (18 Apr 2021 12:00) (100% - 100%)        conj pale, no jaundice  neck - patient has left IJ catheter. neck veins not visible  patient has left AICD - not tender no swelling or redness anterior left chest wall.  right chest wall has tuenneled catheter also no drainage, redness, discharge  heart exam - patient has irregular pulse , has heave anterior chest wall, has holosystolic murmur heard over anterior chest wall. no gallop.  lungs clear to auscultation  abd. soft nontender.  Bs pos.  extremities    no edema. no cyanosis                                 8.4    11.62 )-----------( 303      ( 18 Apr 2021 04:30 )             26.7   04-18    142  |  102  |  39<H>  ----------------------------<  238<H>  3.8   |  25  |  2.9<H>    Ca    8.7      18 Apr 2021 04:30  Phos  3.2     04-18  Mg     1.9     04-18    TPro  5.1<L>  /  Alb  2.5<L>  /  TBili  0.3  /  DBili  x   /  AST  14  /  ALT  <5  /  AlkPhos  143<H>  04-18

## 2021-04-18 NOTE — PROGRESS NOTE ADULT - SUBJECTIVE AND OBJECTIVE BOX
Patient is a 70y old  Female who presents with a chief complaint of syncope and fracture (17 Apr 2021 16:37)      Over Night Events:  Patient seen and examined.   s/p transfusion   s/p ct scan hematoma retrop    ROS:  See HPI    PHYSICAL EXAM    ICU Vital Signs Last 24 Hrs  T(C): 37.7 (18 Apr 2021 08:00), Max: 37.7 (17 Apr 2021 20:00)  T(F): 99.8 (18 Apr 2021 08:00), Max: 99.9 (17 Apr 2021 20:00)  HR: 86 (18 Apr 2021 09:00) (82 - 98)  BP: --  BP(mean): --  ABP: 116/50 (18 Apr 2021 09:00) (108/44 - 146/65)  ABP(mean): 72 (18 Apr 2021 09:00) (66 - 92)  RR: 18 (18 Apr 2021 09:00) (12 - 23)  SpO2: 100% (18 Apr 2021 09:00) (97% - 100%)      General: awake weak   HEENT:       et tube          Lymph Nodes: NO cervical LN   Lungs: Bilateral BS  Cardiovascular: Regular   Abdomen: Soft, Positive BS  Extremities: No clubbing   Skin: warm   Neurological: positive gag  Musculoskeletal: move all ext     I&O's Detail    17 Apr 2021 07:01  -  18 Apr 2021 07:00  --------------------------------------------------------  IN:    Dexmedetomidine: 195.3 mL    Enteral Tube Flush: 180 mL    Heparin: 40 mL    IV PiggyBack: 100 mL    Peptamen A.F.: 615 mL    Phenylephrine: 75 mL    PRBCs (Packed Red Blood Cells): 633 mL  Total IN: 1838.3 mL    OUT:    Indwelling Catheter - Urethral (mL): 20 mL    Other (mL): 2000 mL  Total OUT: 2020 mL    Total NET: -181.7 mL      18 Apr 2021 07:01  -  18 Apr 2021 09:20  --------------------------------------------------------  IN:    Dexmedetomidine: 8 mL    IV PiggyBack: 150 mL    Peptamen A.F.: 135 mL    Phenylephrine: 12 mL  Total IN: 305 mL    OUT:    Indwelling Catheter - Urethral (mL): 0 mL  Total OUT: 0 mL    Total NET: 305 mL          LABS:                          8.4    11.62 )-----------( 303      ( 18 Apr 2021 04:30 )             26.7         18 Apr 2021 04:30    142    |  102    |  39     ----------------------------<  238    3.8     |  25     |  2.9      Ca    8.7        18 Apr 2021 04:30  Phos  3.2       18 Apr 2021 04:30  Mg     1.9       18 Apr 2021 04:30    TPro  5.1    /  Alb  2.5    /  TBili  0.3    /  DBili  x      /  AST  14     /  ALT  <5     /  AlkPhos  143    18 Apr 2021 04:30  Amylase x     lipase x                                                 PT/INR - ( 18 Apr 2021 04:30 )   PT: 29.20 sec;   INR: 2.54 ratio         PTT - ( 18 Apr 2021 04:30 )  PTT:32.1 sec                                                                                                                                                Mode: AC/ CMV (Assist Control/ Continuous Mandatory Ventilation)  RR (machine): 12  TV (machine): 350  FiO2: 30  PEEP: 5  ITime: 1  MAP: 7  PIP: 22                                      ABG - ( 18 Apr 2021 03:01 )  pH, Arterial: 7.47  pH, Blood: x     /  pCO2: 41    /  pO2: 120   / HCO3: 30    / Base Excess: 5.4   /  SaO2: 99                  MEDICATIONS  (STANDING):  aMIOdarone    Tablet 200 milliGRAM(s) Oral two times a day  aspirin  chewable 81 milliGRAM(s) Oral daily  atorvastatin 40 milliGRAM(s) Oral at bedtime  BACItracin   Ointment 1 Application(s) Topical every 12 hours  chlorhexidine 0.12% Liquid 15 milliLiter(s) Oral Mucosa every 12 hours  collagenase Ointment 1 Application(s) Topical two times a day  dexMEDEtomidine Infusion 0.228 MICROgram(s)/kG/Hr (3.3 mL/Hr) IV Continuous <Continuous>  dextrose 40% Gel 15 Gram(s) Oral once  dextrose 5%. 1000 milliLiter(s) (50 mL/Hr) IV Continuous <Continuous>  dextrose 5%. 1000 milliLiter(s) (100 mL/Hr) IV Continuous <Continuous>  dextrose 50% Injectable 25 Gram(s) IV Push once  dextrose 50% Injectable 12.5 Gram(s) IV Push once  dextrose 50% Injectable 25 Gram(s) IV Push once  doxercalciferol Injectable 2 MICROGram(s) IV Push <User Schedule>  epoetin mana-epbx (RETACRIT) Injectable 61872 Unit(s) IV Push <User Schedule>  fentaNYL   Patch  25 MICROgram(s)/Hr 1 Patch Transdermal every 72 hours  glucagon  Injectable 1 milliGRAM(s) IntraMuscular once  insulin lispro (ADMELOG) corrective regimen sliding scale   SubCutaneous Before meals and at bedtime  lactulose Syrup 15 Gram(s) Oral three times a day  levETIRAcetam  IVPB 375 milliGRAM(s) IV Intermittent every 12 hours  levETIRAcetam  IVPB 250 milliGRAM(s) IV Intermittent <User Schedule>  pantoprazole  Injectable 40 milliGRAM(s) IV Push daily  phenylephrine    Infusion 0.1 MICROgram(s)/kG/Min (1.09 mL/Hr) IV Continuous <Continuous>  polyethylene glycol 3350 17 Gram(s) Oral at bedtime  senna 2 Tablet(s) Oral at bedtime  sertraline 50 milliGRAM(s) Oral daily  valproic  acid Syrup 250 milliGRAM(s) Oral two times a day    MEDICATIONS  (PRN):  acetaminophen   Tablet .. 650 milliGRAM(s) Oral every 6 hours PRN Temp greater or equal to 38C (100.4F)          Xrays:  TLC:  OG:  ET tube:                                                                                    no infiltrate    ECHO:  CAM ICU:

## 2021-04-18 NOTE — PROGRESS NOTE ADULT - ASSESSMENT
Impression:  ARF   cardiac arrest unknown etiology   arrythmia V tach ./ afib s/p AICD   multiple fx , hip, humerus , neck   shock ?? cardiogenic   seizure   HD   left arm dvt     PLAN:    CNS: hold precedex for weaning trail    follow neuro sx for the collar neck fx     HEENT: oral care     PULMONARY:  do weaning trail when more awake       CARDIOVASCULAR: follow EP / ct surgery   cardiology follow up     GI: GI prophylaxis. if failed weaning trial start feed      RENAL: HD as per renal     INFECTIOUS DISEASE: off abx       HEMATOLOGICAL:  DVT prophylaxis. of heparin for bleed   follow h/h seriel   follow vascular     ENDOCRINE:  Follow up FS.  Insulin protocol if needed.  follow orthopedics     burn follow up  neck ulceration from the collar   keep tlc for today has left IJ  if of pressors then d/c otherwise will try to change to right IJ but need to stabilize the neck well will give sign out for covering intensivist   a-line for bp because can not check bp from upper ext right humeral fx   and left arm dvt follow vascular    because can not place on right because on neck fracture

## 2021-04-18 NOTE — CONSULT NOTE ADULT - ATTENDING COMMENTS
History, events, data and scans reviewed. Patient examined at bedside. I agree and approves plan of care as outlined above.
Pt seen and examined.  Agree with resident exam and plan.    R IT and proximal humerus fractures  NWB RUE and RLE  plan for ORIF IT when stable  ORtho to continue to follow
Pt in ICU   C -collar in place due to cervical fx with associated pressure ulcer chin and shoulder.    left chin /submandibular area ~ 4 x 3 cm black dry adherent eschar   left shoulder 3 x 3 cm area with central  yellow gray eschar     No evidence of infection     Rec- Santyl / Hydrogel and Xeroform to keep moist   Consider Allevyn or soft dressing to relieve pressure at site if feasible
I was physically present for the key portions of the evaluation and management [ E/M] service provided and agree with the plan which i have reviewed and edited where appropriate     A= vaginal prolapse -- grade 4     = Retention of urine
above noted discussed case with surgical resident dressing changed open wound with adequate drainage
71 yo F with PMH of CAD s/p CABG (November 2019), HFrEF (EF 20%), AS, ESRD (MWF), bladder prolapse requiring chronic Castellon, Diabetes Type 2 on insulin presents from home s/p unwitnessed fall. In ER, she was found to have unresponsive: PEA vs VF arrest s/p successful ROSC. Intubated- extubated- went into WCT- now re-intubated.    ## HFrEF  ## ICM  ## ESRD  ## Unwitnessed fall  ## Cardiac arrest    - Continue CCU monitoring  - Continue amiodarone to maintain sinus  - DC esmolol  - HD/diuresis as per renal/CCU team  - GI work-up for initiation of OAC  - We will consider eval for CRT-D once she stabilizes. We should consider CRT-D placement a day before extubation.  - Will continue to f-up.
Patient seen and examined.  Patient is intubated, and currently undergoing EEG lead placement.  24 hour events have been noted.  CT scan of the cervical spine was reviewed, which demonstrates evidence of a C2 fracture, nondisplaced, without any evidence of spinal cord compression/impingement.    At The present time, patient's other medical issues take precedence, given multiple cardiac arrests since presentation.  Continue maintaining hard cervical collar.  When possible, attempt to extubate.  Minimize sedation when possible to obtain appropriate neurological examination.  Should the patient examination change, obtain a CT scan of the cervical spine/head as appropriate.

## 2021-04-19 NOTE — CONSULT NOTE ADULT - NSICDXPILOT_GEN_ALL_CORE
[place]

## 2021-04-19 NOTE — CONSULT NOTE ADULT - CONSULT REQUESTED BY NAME
[facility]
Medicine Team
ED
ER
ed
Dr Leyva
Dr. Flores
CCU
Dr Michel
Harsha
Medicine
Medicine
Dr. Granda
medicine
primary team
CCU resident

## 2021-04-19 NOTE — PROGRESS NOTE ADULT - SUBJECTIVE AND OBJECTIVE BOX
ORTHO PROGRESS NOTE    Interval History:  POD4 s/p R femur IMN   Patient seen and examined at bedside.  Unable to comply with exam due to baseline mental status.  Dressings clean.  Prevena functioning.    Vital Signs Last 24 Hrs  T(C): 37.6 (18 Apr 2021 04:00), Max: 37.7 (17 Apr 2021 20:00)  T(F): 99.6 (18 Apr 2021 04:00), Max: 99.9 (17 Apr 2021 20:00)  HR: 84 (18 Apr 2021 06:00) (82 - 106)  RR: 12 (18 Apr 2021 06:00) (12 - 23)  SpO2: 100% (18 Apr 2021 06:00) (97% - 100%)    Physical Exam:   Dressing C/D/I  Prevena functioning  Unable to assess sensation or motor  CR<2sec, palpable pulses                   8.4    11.62 )-----------( 303      ( 18 Apr 2021 04:30 )             26.7     142  |  102  |  39<H>  ----------------------------<  238<H>  3.8   |  25  |  2.9<H>    Ca    8.7      18 Apr 2021 04:30  Phos  3.2     04-18  Mg     1.9     04-18  TPro  5.1<L>  /  Alb  2.5<L>  /  TBili  0.3  /  DBili  x   /  AST  14  /  ALT  <5  /  AlkPhos  143<H>  04-18  PT/INR - ( 18 Apr 2021 04:30 )   PT: 29.20 sec;   INR: 2.54 ratio    PTT - ( 18 Apr 2021 04:30 )  PTT:32.1 sec    A/P: 70F POD #4 right hip ORIF.    WBAT on RLE  NWB RUE (proximal humerus fracture)  DVT ppx: SCD, ASA ordered. Last INR 2.54  Pain control  Home medications: resume  HGB 7.5. Continue trend labs: Transfuse PRN  Rest of management per primary  Dispo: Pending PT recommendations

## 2021-04-19 NOTE — PROGRESS NOTE ADULT - SUBJECTIVE AND OBJECTIVE BOX
pt remains intubated, awake but weak, moves L arm, follows some commands  neck brace in place, + ulceration  abd soft, ND, NT, pt tolerating feedings, + BM yesterday 4/18  HD on TTS  Vital Signs Last 24 Hrs  T(C): 36.9 (19 Apr 2021 12:00), Max: 37.6 (18 Apr 2021 20:00)  T(F): 98.5 (19 Apr 2021 12:00), Max: 99.6 (18 Apr 2021 20:00)  HR: 98 (19 Apr 2021 13:00) (80 - 128)  BP: --  BP(mean): --  RR: 96 (19 Apr 2021 13:00) (12 - 96)  SpO2: 100% (19 Apr 2021 13:00) (99% - 100%)    MEDICATIONS  (STANDING):  aMIOdarone    Tablet 200 milliGRAM(s) Oral daily  aspirin  chewable 81 milliGRAM(s) Oral daily  atorvastatin 40 milliGRAM(s) Oral at bedtime  BACItracin   Ointment 1 Application(s) Topical every 12 hours  chlorhexidine 0.12% Liquid 15 milliLiter(s) Oral Mucosa every 12 hours  collagenase Ointment 1 Application(s) Topical two times a day  dexMEDEtomidine Infusion 0.228 MICROgram(s)/kG/Hr (3.3 mL/Hr) IV Continuous <Continuous>  doxercalciferol Injectable 2 MICROGram(s) IV Push <User Schedule>  epoetin mana-epbx (RETACRIT) Injectable 57889 Unit(s) IV Push <User Schedule>  estrogens  Cream 1 Gram(s) Vaginal at bedtime  fentaNYL   Patch  25 MICROgram(s)/Hr 1 Patch Transdermal every 72 hours  heparin  Infusion 1000 Unit(s)/Hr (10 mL/Hr) IV Continuous <Continuous>  insulin lispro (ADMELOG) corrective regimen sliding scale   SubCutaneous Before meals and at bedtime  lactulose Syrup 15 Gram(s) Oral three times a day  levETIRAcetam  IVPB 375 milliGRAM(s) IV Intermittent every 12 hours  levETIRAcetam  IVPB 250 milliGRAM(s) IV Intermittent <User Schedule>  midodrine 10 milliGRAM(s) Oral every 8 hours  pantoprazole  Injectable 40 milliGRAM(s) IV Push daily  phenylephrine    Infusion 0.1 MICROgram(s)/kG/Min (1.09 mL/Hr) IV Continuous <Continuous>  polyethylene glycol 3350 17 Gram(s) Oral at bedtime  senna 2 Tablet(s) Oral at bedtime  sertraline 50 milliGRAM(s) Oral daily  valproic  acid Syrup 250 milliGRAM(s) Oral two times a day                        7.8    10.81 )-----------( 323      ( 19 Apr 2021 05:25 )             25.0   04-19    143  |  104  |  64<HH>  ----------------------------<  213<H>  4.4   |  25  |  3.4<H>    Ca    8.7      19 Apr 2021 05:25  Phos  2.4     04-19  Mg     2.4     04-19    TPro  5.1<L>  /  Alb  2.7<L>  /  TBili  0.3  /  DBili  x   /  AST  13  /  ALT  <5  /  AlkPhos  141<H>  04-19    POCT Blood Glucose.: 187 mg/dL (19 Apr 2021 12:14)  POCT Blood Glucose.: 240 mg/dL (19 Apr 2021 08:12)  POCT Blood Glucose.: 245 mg/dL (18 Apr 2021 21:25)  POCT Blood Glucose.: 167 mg/dL (18 Apr 2021 16:18)

## 2021-04-19 NOTE — CONSULT NOTE ADULT - PROVIDER SPECIALTY LIST ADULT
Electrophysiology
Burn
Surgery
Orthopedics
Thoracic Surgery
Vascular Surgery
Neurology
Nutrition Support
Urology
GYN
Neurosurgery
Cardiology
Infectious Disease
Nephrology
CT Surgery
Heart Failure

## 2021-04-19 NOTE — PROGRESS NOTE ADULT - ASSESSMENT
1. Odontoid fracture. Neurosurgery following.  2. R hip / R humerus fracture. Ortho following. S/P ORIF.  3. ESRD on HD TTS. HD tomorrow: 3 hours, opti 160 dialyzer, 3K bath, 2L UF.  4. Anemia. EPO with HD.  5. Secondary hyperparathyroidism. Hectorol with HD.

## 2021-04-19 NOTE — CONSULT NOTE ADULT - SUBJECTIVE AND OBJECTIVE BOX
GENERAL SURGERY CONSULT NOTE    Patient: GUS WILBURN , 70y ([birth year])Female   MRN: [id number]  Location: [facility] [address] A  Visit: 04-01-21 Inpatient  Date: 04-19-21 @ 19:13    HPI:  71 yo F with PMH of CAD s/p CABG (November 2019), HFrEF (EF 20%), AS, ESRD (MWF), bladder prolapse requiring chronic Castellon, Diabetes Type 2 on insulin presents from home s/p mechanical fall. When the history was taken in the emergency department, she The denied any dizziness/ palpitations/ aura/vertigo. She reported non-radiating R shoulder pain and non-radiating R hip pain on arrival. She was found to have R humeral fracture / R hip fracture / non displaced odontoid fracture/ acute rib fractures/ ? T6 vertebral fracture.     In the emergency department, while the patient was receiving trauma work up she was found pulseless by a PCA in the room ( she was not on the monitor at that time) and had a cardiac arrest. ROSC was achieved after 2 minutes of CPR (1 epinephrine was given and 1 calcium chloride iv). Patient was intubated and sedated. EKG showed RBBB with wide complex tachycardia. Per family at the bedside, the patient had her last hemodialysis yesterday and has been relatively non compliant with her medications and medical care at home. Unable to obtain ros as patient is sedated. CT surgery consulted for Tracheostomy and PEG placement due to failed SBT's and prolonged intubation.                 (01 Apr 2021 07:42)      PAST MEDICAL & SURGICAL HISTORY:  Diabetes    Congestive heart failure (CHF)    Pleural effusion due to congestive heart failure    End stage renal disease  on dialysis Mon, Wed, Fr    Uterine prolapse    Chronic indwelling Castellon catheter    History of repair of hip fracture  2019    S/P CABG (coronary artery bypass graft)  11/2019    History of thoracentesis    Chronic indwelling Castellon catheter        Home Medications:  aspirin 81 mg oral tablet, chewable: 1 tab(s) orally once a day (01 Apr 2021 11:06)  atorvastatin 40 mg oral tablet: 1 tab(s) orally once a day (01 Apr 2021 11:06)  INSULIN LISPRO KWIKPEN  100 UNIT/ML SOPN:  (01 Apr 2021 11:06)  METOLAZONE  5 MG TABS: 1  orally once a day (01 Apr 2021 11:06)  METOPROLOL SUCCINATE ER  25 MG TB24: 1  orally 2 times a day (01 Apr 2021 11:06)  SERTRALINE HCL  50 MG TABS:  (01 Apr 2021 11:06)  SEVELAMER CARBONATE 800MG TABLETS: TK 2 TS PO TID WITH MEALS (01 Apr 2021 11:06)  TORSEMIDE 20MG TABLETS: TK 3 TS PO BID (01 Apr 2021 11:06)  VITAMIN D2 92681MU (ERGO) CAP RX: TK 1 C PO WEEKLY (01 Apr 2021 11:06)        VITALS:  T(F): 99 (04-19-21 @ 16:00), Max: 99.6 (04-18-21 @ 20:00)  HR: 86 (04-19-21 @ 19:00) (80 - 104)  BP: --  RR: 23 (04-19-21 @ 19:00) (12 - 145)  SpO2: 100% (04-19-21 @ 17:00) (100% - 100%)    PHYSICAL EXAM:  General: Intubated on vent  HEENT: NCAT, VIKTORIA, EOMI, Trachea ML, Neck supple  Cardiac: RRR S1, S2, no Murmurs, rubs or gallops  Respiratory: CTAB, normal respiratory effort, breath sounds equal BL, no wheeze, rhonchi or crackles  Abdomen: Soft, non-distended, +BS.      MEDICATIONS  (STANDING):  aMIOdarone    Tablet 200 milliGRAM(s) Oral daily  aspirin  chewable 81 milliGRAM(s) Oral daily  atorvastatin 40 milliGRAM(s) Oral at bedtime  BACItracin   Ointment 1 Application(s) Topical every 12 hours  chlorhexidine 0.12% Liquid 15 milliLiter(s) Oral Mucosa every 12 hours  collagenase Ointment 1 Application(s) Topical two times a day  dexMEDEtomidine Infusion 0.228 MICROgram(s)/kG/Hr (3.3 mL/Hr) IV Continuous <Continuous>  dextrose 40% Gel 15 Gram(s) Oral once  dextrose 5%. 1000 milliLiter(s) (50 mL/Hr) IV Continuous <Continuous>  dextrose 5%. 1000 milliLiter(s) (100 mL/Hr) IV Continuous <Continuous>  dextrose 50% Injectable 25 Gram(s) IV Push once  dextrose 50% Injectable 12.5 Gram(s) IV Push once  dextrose 50% Injectable 25 Gram(s) IV Push once  doxercalciferol Injectable 2 MICROGram(s) IV Push <User Schedule>  epoetin mana-epbx (RETACRIT) Injectable 87544 Unit(s) IV Push <User Schedule>  estrogens  Cream 1 Gram(s) Vaginal at bedtime  fentaNYL   Patch  25 MICROgram(s)/Hr 1 Patch Transdermal every 72 hours  glucagon  Injectable 1 milliGRAM(s) IntraMuscular once  heparin  Infusion 1000 Unit(s)/Hr (10 mL/Hr) IV Continuous <Continuous>  insulin lispro (ADMELOG) corrective regimen sliding scale   SubCutaneous Before meals and at bedtime  lactulose Syrup 15 Gram(s) Oral three times a day  levETIRAcetam  IVPB 250 milliGRAM(s) IV Intermittent <User Schedule>  levETIRAcetam  IVPB 375 milliGRAM(s) IV Intermittent every 12 hours  midodrine 10 milliGRAM(s) Oral every 8 hours  pantoprazole  Injectable 40 milliGRAM(s) IV Push daily  phenylephrine    Infusion 0.1 MICROgram(s)/kG/Min (1.09 mL/Hr) IV Continuous <Continuous>  polyethylene glycol 3350 17 Gram(s) Oral at bedtime  senna 2 Tablet(s) Oral at bedtime  sertraline 50 milliGRAM(s) Oral daily  valproic  acid Syrup 250 milliGRAM(s) Oral two times a day    MEDICATIONS  (PRN):  acetaminophen   Tablet .. 650 milliGRAM(s) Oral every 6 hours PRN Temp greater or equal to 38C (100.4F)  morphine  - Injectable 2 milliGRAM(s) IV Push every 4 hours PRN Severe Pain (7 - 10)      LAB/STUDIES:                        7.8    10.81 )-----------( 323      ( 19 Apr 2021 05:25 )             25.0     04-19    143  |  104  |  64<HH>  ----------------------------<  213<H>  4.4   |  25  |  3.4<H>    Ca    8.7      19 Apr 2021 05:25  Phos  2.4     04-19  Mg     2.4     04-19    TPro  5.1<L>  /  Alb  2.7<L>  /  TBili  0.3  /  DBili  x   /  AST  13  /  ALT  <5  /  AlkPhos  141<H>  04-19    PT/INR - ( 19 Apr 2021 05:25 )   PT: 14.80 sec;   INR: 1.29 ratio         PTT - ( 19 Apr 2021 05:25 )  PTT:26.6 sec  LIVER FUNCTIONS - ( 19 Apr 2021 05:25 )  Alb: 2.7 g/dL / Pro: 5.1 g/dL / ALK PHOS: 141 U/L / ALT: <5 U/L / AST: 13 U/L / GGT: x                     ABG - ( 19 Apr 2021 10:34 )  pH, Arterial: 7.45  pH, Blood: x     /  pCO2: 42    /  pO2: 128   / HCO3: 29    / Base Excess: 4.9   /  SaO2: 99                  IMAGING:    < from: Xray Chest 1 View- PORTABLE-Routine (Xray Chest 1 View- PORTABLE-Routine in AM.) (04.19.21 @ 06:12) >  Impression:    Stable left pleural effusion/opacity.  Stable support devices.      < end of copied text >

## 2021-04-19 NOTE — PROGRESS NOTE ADULT - ASSESSMENT
IMP:  - fall with multiple fractures  - cardiac arrest    - cardiogenic shock    - EF 20%  - resp failure/ vent dependence  - ESRD on HD  - DM     REE by PSE 1262 kcal/d, est protein needs ~ 80 gm/d  f/u phos with daily am labs - note relatively low phos levels, d/w nephrology  document BMs, and note that pt is on a large amount of fiber and cathartic meds  cont Pepatmen AF at 45 ml/h (alternatively 375 ml q8h with PRE-FEED poc glucose and Lispro as needed  add Chris twice a day - mix each package in ~ 4 oz water and give via NG over 10 min immediately prior to first 2 feeds of the day  considering trach and GT placement later this week  glycemic control !!

## 2021-04-19 NOTE — CONSULT NOTE ADULT - CONSULT REQUESTED DATE/TIME
01-Apr-2021 04:13
01-Apr-2021 06:11
01-Apr-2021 17:24
16-Apr-2021 16:55
17-Apr-2021 16:40
01-Apr-2021 12:24
02-Apr-2021 16:22
18-Apr-2021 14:04
01-Apr-2021 14:28
05-Apr-2021 17:40
18-Apr-2021 11:47
19-Apr-2021 19:13
01-Apr-2021 17:46
12-Apr-2021 10:25
16-Apr-2021 17:15
04-Apr-2021 05:20

## 2021-04-19 NOTE — PROGRESS NOTE ADULT - ASSESSMENT
Assessment:    69 y/o F with PMH A-fib, CAD, S/P CABG (11/2019), HFrEF (EF 20%), AS, ESRD w/ HD M/W/F, bladder prolapse requiring chronic montejo, T2DM (on Insulin), BIBA 2/2 mechanical fall. Multiple body fractures on arrival to the ED. While in ED, s/p cardiac arrest of unknown downtime d/t unmonitored, however, ROSC achieved 2 minutes after initiation of CPR. Intubated for airway protection. Post-cardiac arrest, noted seizure-like episode, with right gaze preference and clonic jerking of extremities while decreasing sedation for CPAP trials. Symptoms lasting approximately 5 minutes, that resolved with Versed 2mg IV. Another similar episode witnessed by family, lasting approximately one minutes, that resolved with no intervention. Initial CTH, 4/1/2021, negative for acute intracranial pathology, with subsequent imaging post seizure revealing no change from previous CTH. vEEG, 4/3/2021, consistent for metabolic process, with bifrontal sharps but no seizure activity. vEEG, 4/5/2021, revealed no seizure activity. S/p AICD. Pt remains intubated on precedex, now following commands.     Plan:    Neurology:  - Seizure precautions  - Continue Keppra 375mg IV q12hrs and 250mg post-HMD  - Continue Valproic acid 250mg PO q12hrs  - Continue ASA 81mg PO q24hrs  - Continue Atorvastatin 40mg PO q24hrs    No further neurology critical care workup needed at this time. Please recall, reconsult with any questions or concerns.    Hailee Dong NP   ex [phone]

## 2021-04-19 NOTE — PROGRESS NOTE ADULT - ASSESSMENT
69 y/o F with a pmhx of CAD s/p CABG (Nov 2019), HFrEF (EF 20%), AS, ESRD (MWF), bladder prolapse requiring chronic Castellon, Diabetes Type 2 on insulin presents from home s/p mechanical fall. Trauma workup in ED significant for R humeral fracture / R hip fracture / non displaced odontoid fracture/ acute rib fractures. In the ED patient arrested, ROSC achieved after 2 minutes of CPR. EKG at the time showed RBBB with wide complex tachy. Pt subsequently intubated and sedated, started on pressors. Pt Experienced seizure-like episode during weaning trial s/p 2mg Versed.  (4/2/21) Weaning trial passed. Patient was Extubated . 02 sat began to decrease to low 80s, tachypenic and cyanotic started on bipap with no improvement. Patient again became hypotensive and tachy. Went into Afib. s/p cardioversion x3 and started on amio     # RT Humerus/Rt femur fracture/Rt Hip fracture   - Ortho following  - Pain control. Currently on Fentanyl drip   - S/p Intramedullary Rodding of fracture of Rt femur   - S/p 1 u PRBCs intraoperatively     #Cardiac arrest in the setting of arythmia and CHF exacerbation   - s/p LHC (4/9) - Patent LIMA and SVG grafts, Transaortic gradient ~ 10-15mmHg  - Trops .22-->.24 -->.21-->.34 (4/4/21)  - CTA negative for PE   - Lower extremity duplex negative for DVT( 4/6/21)  - s/p BIV ICD placement. EP interrogation no events   - Weaning trial. Plan for extubation today.     # Neck Ulceration  - Burn Consult to evaluate for neck ulceration     #LUE DVT  - UE duplex : Thrombus noted in the left brachial vein.  - Started on Heparin     # Seizure unknown etiology  - CT Head (4/1) negative   - Keppra 500mg adminstered. followed by 375mg BID with additional 250mg dose after dialysis  - Started on Depakote 250mg PO BID   - VEEG - bifrontal sharps but no seizure activity. Consistent with metabolic process.     # Aspiration Pneumonia vs Adenovirus Pneumonia   - CXR : Stable bilateral opacities. No pneumothorax. Support devices, in satisfactory position.  - s/p Completion of ceftriaxone       # Perirectal abscess  - surgery is following patient. Dr Mota   - s/p debridement over a month. No need for packing as per surgery     #Odontoid fracture   - Neurosurgery following patient   - Want to c/w collar      #ESRD  - Nephro following patient  - HD 4/15     #uterine prolapse ulcer  -gyn saw her  -recommended A and D ointment  treatment at area of ulcer  -currently does not need further intervention per gyn     # h/o DM   - fingersticks tid  - hold home insulin  - insulin protocol if > 180    # DVT PPX: heparin subq  # GI PPX: PPI  # Diet: NPO   # Activity: bedrest  # Bowel regimen 69 y/o F with a pmhx of CAD s/p CABG (Nov 2019), HFrEF (EF 20%), AS, ESRD (MWF), bladder prolapse requiring chronic Castellon, Diabetes Type 2 on insulin presents from home s/p mechanical fall. Trauma workup in ED significant for R humeral fracture / R hip fracture / non displaced odontoid fracture/ acute rib fractures. In the ED patient arrested, ROSC achieved after 2 minutes of CPR. EKG at the time showed RBBB with wide complex tachy. Pt subsequently intubated and sedated, started on pressors. Pt Experienced seizure-like episode during weaning trial s/p 2mg Versed.  (4/2/21) Weaning trial passed. Patient was Extubated . 02 sat began to decrease to low 80s, tachypenic and cyanotic started on bipap with no improvement. Patient again became hypotensive and tachy. Went into Afib. s/p cardioversion x3 and started on amio     #intubated  -ABG good but low NIF 20s  -plan to tracheostomy; attending Dr. Raya spoke with healthcare proxy    # RT Humerus/Rt femur fracture/Rt Hip fracture   - Ortho following  - Pain control. Currently on Fentanyl drip   - S/p Intramedullary Rodding of fracture of Rt femur   - S/p 1 u PRBCs intraoperatively     #Cardiac arrest in the setting of arythmia and CHF exacerbation   - s/p LHC (4/9) - Patent LIMA and SVG grafts, Transaortic gradient ~ 10-15mmHg  - Trops .22-->.24 -->.21-->.34 (4/4/21)  - CTA negative for PE   - Lower extremity duplex negative for DVT( 4/6/21)  - s/p BIV ICD placement. EP interrogation no events   - Weaning trial. Plan for extubation today.     # Neck Ulceration  -burn recs were appreciated    #LUE DVT  - UE duplex : Thrombus noted in the left brachial vein.  - Started on Heparin     # Seizure unknown etiology  - CT Head (4/1) negative   - Keppra 500mg adminstered. followed by 375mg BID with additional 250mg dose after dialysis  - Started on Depakote 250mg PO BID   - VEEG - bifrontal sharps but no seizure activity. Consistent with metabolic process.     # Aspiration Pneumonia vs Adenovirus Pneumonia   - CXR : Stable bilateral opacities. No pneumothorax. Support devices, in satisfactory position.  - s/p Completion of ceftriaxone       # Perirectal abscess  - surgery is following patient. Dr Mota   - s/p debridement over a month. No need for packing as per surgery     #Odontoid fracture   - Neurosurgery following patient   - Want to c/w collar      #ESRD  - Nephro following patient  - HD 4/15   -ESRD on HD TTS. HD tomorrow: 3 hours, opti 160 dialyzer, 3K bath, 2L UF.    #uterine prolapse ulcer  -gyn saw her  -recommended A and D ointment  treatment at area of ulcer  -currently does not need further intervention per gyn     # h/o DM   - fingersticks tid  - hold home insulin  - insulin protocol if > 180    # DVT PPX: heparin subq  # GI PPX: PPI  # Diet: NPO   # Activity: bedrest  # Bowel regimen

## 2021-04-19 NOTE — PROGRESS NOTE ADULT - SUBJECTIVE AND OBJECTIVE BOX
GUS WILBURN 70y Female  MRN#: [id number]       SUBJECTIVE  Patient is a 70y old Female who presents with a chief complaint of fall/syncope with multiple fractures (18 Apr 2021 15:20)      ***    Today is hospital day 18d, and this morning she is _.    No acute overnight events.     OBJECTIVE  PAST MEDICAL & SURGICAL HISTORY  Diabetes    Congestive heart failure (CHF)    Pleural effusion due to congestive heart failure    End stage renal disease  on dialysis Mon, Wed, Fr    Uterine prolapse    Chronic indwelling Castellon catheter    History of repair of hip fracture  2019    S/P CABG (coronary artery bypass graft)  11/2019    History of thoracentesis    Chronic indwelling Castellon catheter      ALLERGIES:  No Known Allergies    MEDICATIONS:  STANDING MEDICATIONS  aMIOdarone    Tablet 200 milliGRAM(s) Oral daily  aspirin  chewable 81 milliGRAM(s) Oral daily  atorvastatin 40 milliGRAM(s) Oral at bedtime  BACItracin   Ointment 1 Application(s) Topical every 12 hours  chlorhexidine 0.12% Liquid 15 milliLiter(s) Oral Mucosa every 12 hours  collagenase Ointment 1 Application(s) Topical two times a day  dexMEDEtomidine Infusion 0.228 MICROgram(s)/kG/Hr IV Continuous <Continuous>  dextrose 40% Gel 15 Gram(s) Oral once  dextrose 5%. 1000 milliLiter(s) IV Continuous <Continuous>  dextrose 5%. 1000 milliLiter(s) IV Continuous <Continuous>  dextrose 50% Injectable 25 Gram(s) IV Push once  dextrose 50% Injectable 12.5 Gram(s) IV Push once  dextrose 50% Injectable 25 Gram(s) IV Push once  doxercalciferol Injectable 2 MICROGram(s) IV Push <User Schedule>  epoetin mana-epbx (RETACRIT) Injectable 85832 Unit(s) IV Push <User Schedule>  estrogens  Cream 1 Gram(s) Vaginal at bedtime  fentaNYL   Patch  25 MICROgram(s)/Hr 1 Patch Transdermal every 72 hours  glucagon  Injectable 1 milliGRAM(s) IntraMuscular once  heparin  Infusion 1000 Unit(s)/Hr IV Continuous <Continuous>  insulin lispro (ADMELOG) corrective regimen sliding scale   SubCutaneous Before meals and at bedtime  lactulose Syrup 15 Gram(s) Oral three times a day  levETIRAcetam  IVPB 375 milliGRAM(s) IV Intermittent every 12 hours  levETIRAcetam  IVPB 250 milliGRAM(s) IV Intermittent <User Schedule>  midodrine 10 milliGRAM(s) Oral every 8 hours  pantoprazole  Injectable 40 milliGRAM(s) IV Push daily  phenylephrine    Infusion 0.1 MICROgram(s)/kG/Min IV Continuous <Continuous>  polyethylene glycol 3350 17 Gram(s) Oral at bedtime  senna 2 Tablet(s) Oral at bedtime  sertraline 50 milliGRAM(s) Oral daily  valproic  acid Syrup 250 milliGRAM(s) Oral two times a day    PRN MEDICATIONS  acetaminophen   Tablet .. 650 milliGRAM(s) Oral every 6 hours PRN  morphine  - Injectable 2 milliGRAM(s) IV Push every 4 hours PRN      VITAL SIGNS: Last 24 Hours  T(C): 36.9 (19 Apr 2021 12:00), Max: 37.6 (18 Apr 2021 20:00)  T(F): 98.5 (19 Apr 2021 12:00), Max: 99.6 (18 Apr 2021 20:00)  HR: 98 (19 Apr 2021 13:00) (80 - 128)  BP: --  BP(mean): --  RR: 96 (19 Apr 2021 13:00) (12 - 96)  SpO2: 100% (19 Apr 2021 13:00) (99% - 100%)    LABS:                        7.8    10.81 )-----------( 323      ( 19 Apr 2021 05:25 )             25.0     04-19    143  |  104  |  64<HH>  ----------------------------<  213<H>  4.4   |  25  |  3.4<H>    Ca    8.7      19 Apr 2021 05:25  Phos  2.4     04-19  Mg     2.4     04-19    TPro  5.1<L>  /  Alb  2.7<L>  /  TBili  0.3  /  DBili  x   /  AST  13  /  ALT  <5  /  AlkPhos  141<H>  04-19    PT/INR - ( 19 Apr 2021 05:25 )   PT: 14.80 sec;   INR: 1.29 ratio         PTT - ( 19 Apr 2021 05:25 )  PTT:26.6 sec    ABG - ( 19 Apr 2021 10:34 )  pH, Arterial: 7.45  pH, Blood: x     /  pCO2: 42    /  pO2: 128   / HCO3: 29    / Base Excess: 4.9   /  SaO2: 99          RADIOLOGY: reviewed      PHYSICAL EXAM:    GENERAL: NAD, alert and follows command  HEENT:  Atraumatic, Normocephalic. EOMI, PERRLA, 5cm chin ulceration   PULMONARY: Clear to auscultation bilaterally; No wheeze  CARDIOVASCULAR: Regular rate and rhythm; No murmurs, rubs, or gallops  GASTROINTESTINAL: Soft, Nontender, Nondistended; Bowel sounds present  MUSCULOSKELETAL:  2+ Peripheral Pulses, No clubbing, cyanosis, or edema  NEUROLOGY: non-focal      ADMISSION SUMMARY  Patient is a 70y old Female who presents with a chief complaint of fall/syncope with multiple fractures (18 Apr 2021 15:20)

## 2021-04-19 NOTE — PROGRESS NOTE ADULT - ATTENDING COMMENTS
Patient seen today with neurocritical care ACP NP Iker.   I was physically present for the key portions of the evaluation and management (E/M) service provided.  I agree with the above history, physical, and plan with the following additions/modifications    Following commands briskly now off sedation, has good neurologic prognosis after her brief cardiac arrest.  No further workup indicated, neuro will sign off    Sergio Bolanos MD  Attending Neurointensivist

## 2021-04-19 NOTE — PROGRESS NOTE ADULT - SUBJECTIVE AND OBJECTIVE BOX
Neurology Progress Note     HPI:    69 yo F with PMH of CAD s/p CABG (November 2019), HFrEF (EF 20%), AS, ESRD (MWF), bladder prolapse requiring chronic Castellon, Diabetes Type 2 on insulin presents from home s/p mechanical fall. When the history was taken in the emergency department, she The denied any dizziness/ palpitations/ aura/vertigo. She reported non-radiating R shoulder pain and non-radiating R hip pain on arrival. She was found to have R humeral fracture / R hip fracture / non displaced odontoid fracture/ acute rib fractures/ ? T6 vertebral fracture.     In the emergency department, while the patient was receiving trauma work up she was found pulseless by a PCA in the room ( she was not on the monitor at that time) and had a cardiac arrest. ROSC was achieved after 2 minutes of CPR (1 epinephrine was given and 1 calcium chloride iv). Patient was intubated and sedated. EKG showed RBBB with wide complex tachycardia. Per family at the bedside, the patient had her last hemodialysis yesterday and has been relatively non compliant with her medications and medical care at home. Unable to obtain ros as patient is sedated.       PAST MEDICAL & SURGICAL HISTORY:    Diabetes    Congestive heart failure (CHF)    Pleural effusion due to congestive heart failure    End stage renal disease  on dialysis Mon, Wed, Fr    Uterine prolapse    Chronic indwelling Castellon catheter    History of repair of hip fracture  2019    S/P CABG (coronary artery bypass graft)  11/2019    History of thoracentesis    Chronic indwelling Castellon catheter    Home Medications:  aspirin 81 mg oral tablet, chewable: 1 tab(s) orally once a day (01 Apr 2021 11:06)  atorvastatin 40 mg oral tablet: 1 tab(s) orally once a day (01 Apr 2021 11:06)  INSULIN LISPRO KWIKPEN  100 UNIT/ML SOPN:  (01 Apr 2021 11:06)  METOLAZONE  5 MG TABS: 1  orally once a day (01 Apr 2021 11:06)  METOPROLOL SUCCINATE ER  25 MG TB24: 1  orally 2 times a day (01 Apr 2021 11:06)  SERTRALINE HCL  50 MG TABS:  (01 Apr 2021 11:06)  SEVELAMER CARBONATE 800MG TABLETS: TK 2 TS PO TID WITH MEALS (01 Apr 2021 11:06)  TORSEMIDE 20MG TABLETS: TK 3 TS PO BID (01 Apr 2021 11:06)  VITAMIN D2 33112IW (ERGO) CAP RX: TK 1 C PO WEEKLY (01 Apr 2021 11:06)    Current Medications:   aMIOdarone    Tablet 200 milliGRAM(s) Oral daily  aspirin  chewable 81 milliGRAM(s) Oral daily  atorvastatin 40 milliGRAM(s) Oral at bedtime  BACItracin   Ointment 1 Application(s) Topical every 12 hours  chlorhexidine 0.12% Liquid 15 milliLiter(s) Oral Mucosa every 12 hours  collagenase Ointment 1 Application(s) Topical two times a day  dexMEDEtomidine Infusion 0.228 MICROgram(s)/kG/Hr IV Continuous <Continuous>  dextrose 40% Gel 15 Gram(s) Oral once  dextrose 5%. 1000 milliLiter(s) IV Continuous <Continuous>  dextrose 5%. 1000 milliLiter(s) IV Continuous <Continuous>  dextrose 50% Injectable 25 Gram(s) IV Push once  dextrose 50% Injectable 12.5 Gram(s) IV Push once  dextrose 50% Injectable 25 Gram(s) IV Push once  doxercalciferol Injectable 2 MICROGram(s) IV Push <User Schedule>  epoetin mana-epbx (RETACRIT) Injectable 33744 Unit(s) IV Push <User Schedule>  estrogens  Cream 1 Gram(s) Vaginal at bedtime  fentaNYL   Patch  25 MICROgram(s)/Hr 1 Patch Transdermal every 72 hours  glucagon  Injectable 1 milliGRAM(s) IntraMuscular once  heparin  Infusion 1000 Unit(s)/Hr IV Continuous <Continuous>  insulin lispro (ADMELOG) corrective regimen sliding scale   SubCutaneous Before meals and at bedtime  lactulose Syrup 15 Gram(s) Oral three times a day  levETIRAcetam  IVPB 375 milliGRAM(s) IV Intermittent every 12 hours  levETIRAcetam  IVPB 250 milliGRAM(s) IV Intermittent <User Schedule>  midodrine 10 milliGRAM(s) Oral every 8 hours  pantoprazole  Injectable 40 milliGRAM(s) IV Push daily  phenylephrine    Infusion 0.1 MICROgram(s)/kG/Min IV Continuous <Continuous>  polyethylene glycol 3350 17 Gram(s) Oral at bedtime  senna 2 Tablet(s) Oral at bedtime  sertraline 50 milliGRAM(s) Oral daily  valproic  acid Syrup 250 milliGRAM(s) Oral two times a day      Vital Signs:     T(F): 98.5 (04-19-21 @ 12:00), Max: 99.6 (04-18-21 @ 20:00)  HR: 98 (04-19-21 @ 13:00) (80 - 128)  BP: --  RR: 96 (04-19-21 @ 13:00) (12 - 96)  SpO2: 100% (04-19-21 @ 13:00) (99% - 100%)                        7.8    10.81 )-----------( 323      ( 19 Apr 2021 05:25 )             25.0     04-19    143  |  104  |  64<HH>  ----------------------------<  213<H>  4.4   |  25  |  3.4<H>    Ca    8.7      19 Apr 2021 05:25  Phos  2.4     04-19  Mg     2.4     04-19    TPro  5.1<L>  /  Alb  2.7<L>  /  TBili  0.3  /  DBili  x   /  AST  13  /  ALT  <5  /  AlkPhos  141<H>  04-19    PT/INR - ( 19 Apr 2021 05:25 )   PT: 14.80 sec;   INR: 1.29 ratio         PTT - ( 19 Apr 2021 05:25 )  PTT:26.6 sec    LIVER FUNCTIONS - ( 19 Apr 2021 05:25 )  Alb: 2.7 g/dL / Pro: 5.1 g/dL / ALK PHOS: 141 U/L / ALT: <5 U/L / AST: 13 U/L / GGT: x                 I&O's Detail    18 Apr 2021 07:01  -  19 Apr 2021 07:00  --------------------------------------------------------  IN:    Dexmedetomidine: 151.8 mL    Enteral Tube Flush: 120 mL    IV PiggyBack: 250 mL    Peptamen A.F.: 1080 mL    Phenylephrine: 94 mL  Total IN: 1695.8 mL    OUT:    Indwelling Catheter - Urethral (mL): 10 mL    Voided (mL): 25 mL  Total OUT: 35 mL    Total NET: 1660.8 mL      19 Apr 2021 07:01  -  19 Apr 2021 14:57  --------------------------------------------------------  IN:    Phenylephrine: 6 mL  Total IN: 6 mL    OUT:  Total OUT: 0 mL    Total NET: 6 mL      Neuro Exam:    Neurological:  Mental Status: Alert and Oriented to person, place, and time, cooperative, pleasant. Affect appropriate, thought, speech, and language coherent. Short- and long-term memory, abstract thinking and calculation intact.  Cranial Nerves:  I: intact  II, III, IV, VI: PERRLA, EOM intact. Carranza intact by confrontation. No cranial nerve palsy or nystagmus noted.  V: facial sensation intact; masseter/ temporal muscles intact, corneal reflex intact bilaterally  VII: face symmetrical at rest and with expression  VIII: intact to finger rub in the right ear and left ear  IX and X: no hoarseness, gag intact, palate/ uvula rise symmetrically  XI: SCM/ trapezius strength intact bilateral  XII: no dysarthria, tongue weakness/fasiculation   Motor: Normal muscle bulk/tone. Good posture. Strength 5+/5+ upper & lower extremities  Sensory: Sensation to light touch, pain, vibration, proprioception, and stereognosis intact to trunk and bilaterally to both upper and lower extremities. Normal two point discrimination.   Cerebellar Function: Normal gait including tandem, heel and toe walking. Romberg and pronator drift negative. Normal rapid alternating movements and point to point test.  Reflexes: No clonus      Last CTH:    CT Head No Cont (04.17.21 @ 15:47)     IMPRESSION: No evidence of acute hemorrhage mass or mass effect.      Last CTA/MRA:    CT Angio Chest PE Protocol w/ IV Cont (04.01.21 @ 06:10)     IMPRESSION:    No CTA evidence of acute pulmonary embolism.    Acute comminuted fracture of the right humeral neck.    Acute comminuted right intertrochanteric fracture.    Age-indeterminate fracture of T6 vertebral body, new since prior examination.    Multiple bilateral acute and chronic rib fractures.    Bilateral groundglass and consolidative pulmonary opacities, mostly nodular right base. Small bilateral pleural effusions and abdominopelvic ascites. Interlobular septal thickening consistent with pulmonary edema.    Gallbladder distention with cholelithiasis.      Last EEG:      VEEG in the last 24 hours:    Background----------  continues, less than optimally organized, reactive and reaching frequencies in the range of 6-7 hz    Focal and generalized slowing-------------   moderate generalized slowing                                                                 bifrontal slowing  Interictal activity---------  large amount of diffusely expressed triphasic waves that some are clealy epileptiform in nature and are stimulus dependent. This activity occasionally appears in psuedo-periodic pattern    Events---------------  none    Seizures------------  none    Impression:  1- encephalopathy  2- potential for seizure that is not lateralizing    Plan -  discussed with the NCC team. plan as/ the NCC team        ABG:ABG - ( 19 Apr 2021 10:34 )  pH, Arterial: 7.45  pH, Blood: x     /  pCO2: 42    /  pO2: 128   / HCO3: 29    / Base Excess: 4.9   /  SaO2: 99            Mode: AC/ CMV (Assist Control/ Continuous Mandatory Ventilation)  RR (machine): 12  TV (machine): 350  FiO2: 30  PEEP: 5           Neurology Progress Note     HPI:    69 yo F with PMH of CAD s/p CABG (November 2019), HFrEF (EF 20%), AS, ESRD (MWF), bladder prolapse requiring chronic Castellon, Diabetes Type 2 on insulin presents from home s/p mechanical fall. When the history was taken in the emergency department, she The denied any dizziness/ palpitations/ aura/vertigo. She reported non-radiating R shoulder pain and non-radiating R hip pain on arrival. She was found to have R humeral fracture / R hip fracture / non displaced odontoid fracture/ acute rib fractures/ ? T6 vertebral fracture.     In the emergency department, while the patient was receiving trauma work up she was found pulseless by a PCA in the room ( she was not on the monitor at that time) and had a cardiac arrest. ROSC was achieved after 2 minutes of CPR (1 epinephrine was given and 1 calcium chloride iv). Patient was intubated and sedated. EKG showed RBBB with wide complex tachycardia. Per family at the bedside, the patient had her last hemodialysis yesterday and has been relatively non compliant with her medications and medical care at home. Unable to obtain ros as patient is sedated.       PAST MEDICAL & SURGICAL HISTORY:    Diabetes    Congestive heart failure (CHF)    Pleural effusion due to congestive heart failure    End stage renal disease  on dialysis Mon, Wed, Fr    Uterine prolapse    Chronic indwelling Castellon catheter    History of repair of hip fracture  2019    S/P CABG (coronary artery bypass graft)  11/2019    History of thoracentesis    Chronic indwelling Castellon catheter    Home Medications:  aspirin 81 mg oral tablet, chewable: 1 tab(s) orally once a day (01 Apr 2021 11:06)  atorvastatin 40 mg oral tablet: 1 tab(s) orally once a day (01 Apr 2021 11:06)  INSULIN LISPRO KWIKPEN  100 UNIT/ML SOPN:  (01 Apr 2021 11:06)  METOLAZONE  5 MG TABS: 1  orally once a day (01 Apr 2021 11:06)  METOPROLOL SUCCINATE ER  25 MG TB24: 1  orally 2 times a day (01 Apr 2021 11:06)  SERTRALINE HCL  50 MG TABS:  (01 Apr 2021 11:06)  SEVELAMER CARBONATE 800MG TABLETS: TK 2 TS PO TID WITH MEALS (01 Apr 2021 11:06)  TORSEMIDE 20MG TABLETS: TK 3 TS PO BID (01 Apr 2021 11:06)  VITAMIN D2 29258QD (ERGO) CAP RX: TK 1 C PO WEEKLY (01 Apr 2021 11:06)    Current Medications:   aMIOdarone    Tablet 200 milliGRAM(s) Oral daily  aspirin  chewable 81 milliGRAM(s) Oral daily  atorvastatin 40 milliGRAM(s) Oral at bedtime  BACItracin   Ointment 1 Application(s) Topical every 12 hours  chlorhexidine 0.12% Liquid 15 milliLiter(s) Oral Mucosa every 12 hours  collagenase Ointment 1 Application(s) Topical two times a day  dexMEDEtomidine Infusion 0.228 MICROgram(s)/kG/Hr IV Continuous <Continuous>  dextrose 40% Gel 15 Gram(s) Oral once  dextrose 5%. 1000 milliLiter(s) IV Continuous <Continuous>  dextrose 5%. 1000 milliLiter(s) IV Continuous <Continuous>  dextrose 50% Injectable 25 Gram(s) IV Push once  dextrose 50% Injectable 12.5 Gram(s) IV Push once  dextrose 50% Injectable 25 Gram(s) IV Push once  doxercalciferol Injectable 2 MICROGram(s) IV Push <User Schedule>  epoetin mana-epbx (RETACRIT) Injectable 32475 Unit(s) IV Push <User Schedule>  estrogens  Cream 1 Gram(s) Vaginal at bedtime  fentaNYL   Patch  25 MICROgram(s)/Hr 1 Patch Transdermal every 72 hours  glucagon  Injectable 1 milliGRAM(s) IntraMuscular once  heparin  Infusion 1000 Unit(s)/Hr IV Continuous <Continuous>  insulin lispro (ADMELOG) corrective regimen sliding scale   SubCutaneous Before meals and at bedtime  lactulose Syrup 15 Gram(s) Oral three times a day  levETIRAcetam  IVPB 375 milliGRAM(s) IV Intermittent every 12 hours  levETIRAcetam  IVPB 250 milliGRAM(s) IV Intermittent <User Schedule>  midodrine 10 milliGRAM(s) Oral every 8 hours  pantoprazole  Injectable 40 milliGRAM(s) IV Push daily  phenylephrine    Infusion 0.1 MICROgram(s)/kG/Min IV Continuous <Continuous>  polyethylene glycol 3350 17 Gram(s) Oral at bedtime  senna 2 Tablet(s) Oral at bedtime  sertraline 50 milliGRAM(s) Oral daily  valproic  acid Syrup 250 milliGRAM(s) Oral two times a day      Vital Signs:     T(F): 98.5 (04-19-21 @ 12:00), Max: 99.6 (04-18-21 @ 20:00)  HR: 98 (04-19-21 @ 13:00) (80 - 128)  BP: --  RR: 96 (04-19-21 @ 13:00) (12 - 96)  SpO2: 100% (04-19-21 @ 13:00) (99% - 100%)                        7.8    10.81 )-----------( 323      ( 19 Apr 2021 05:25 )             25.0     04-19    143  |  104  |  64<HH>  ----------------------------<  213<H>  4.4   |  25  |  3.4<H>    Ca    8.7      19 Apr 2021 05:25  Phos  2.4     04-19  Mg     2.4     04-19    TPro  5.1<L>  /  Alb  2.7<L>  /  TBili  0.3  /  DBili  x   /  AST  13  /  ALT  <5  /  AlkPhos  141<H>  04-19    PT/INR - ( 19 Apr 2021 05:25 )   PT: 14.80 sec;   INR: 1.29 ratio         PTT - ( 19 Apr 2021 05:25 )  PTT:26.6 sec    LIVER FUNCTIONS - ( 19 Apr 2021 05:25 )  Alb: 2.7 g/dL / Pro: 5.1 g/dL / ALK PHOS: 141 U/L / ALT: <5 U/L / AST: 13 U/L / GGT: x                 I&O's Detail    18 Apr 2021 07:01  -  19 Apr 2021 07:00  --------------------------------------------------------  IN:    Dexmedetomidine: 151.8 mL    Enteral Tube Flush: 120 mL    IV PiggyBack: 250 mL    Peptamen A.F.: 1080 mL    Phenylephrine: 94 mL  Total IN: 1695.8 mL    OUT:    Indwelling Catheter - Urethral (mL): 10 mL    Voided (mL): 25 mL  Total OUT: 35 mL    Total NET: 1660.8 mL      19 Apr 2021 07:01  -  19 Apr 2021 14:57  --------------------------------------------------------  IN:    Phenylephrine: 6 mL  Total IN: 6 mL    OUT:  Total OUT: 0 mL    Total NET: 6 mL      Neuro Exam:    Neurological:  Mental Status: Pt eyes open spontaneously. Pt able to follow simple commands. Pt able to blink, squeeze hand and move lower extremities when asked. Pt attempting to speak over ET tube   Cranial Nerves:  II, III, IV, VI: PERRLA. Left gaze preference.   V: corneal reflex intact bilaterally  VII: differed  VIII: differed   IX and X: gag intact, palate  XI: differed  XII: differed   Motor:  Right extremity withdraws to pain (pt has a sling in place for fx), Left arm moves spontaneously on same plane. Lower extremities- pt able to move feet spontaneously.   Sensory: Sensation to light touch, pain intact to trunk and bilaterally to both upper and lower extremities.   Reflexes: No clonus      Last CTH:    CT Head No Cont (04.17.21 @ 15:47)     IMPRESSION: No evidence of acute hemorrhage mass or mass effect.      Last CTA/MRA:    CT Angio Chest PE Protocol w/ IV Cont (04.01.21 @ 06:10)     IMPRESSION:    No CTA evidence of acute pulmonary embolism.    Acute comminuted fracture of the right humeral neck.    Acute comminuted right intertrochanteric fracture.    Age-indeterminate fracture of T6 vertebral body, new since prior examination.    Multiple bilateral acute and chronic rib fractures.    Bilateral groundglass and consolidative pulmonary opacities, mostly nodular right base. Small bilateral pleural effusions and abdominopelvic ascites. Interlobular septal thickening consistent with pulmonary edema.    Gallbladder distention with cholelithiasis.      Last EEG:      VEEG in the last 24 hours:    Background----------  continues, less than optimally organized, reactive and reaching frequencies in the range of 6-7 hz    Focal and generalized slowing-------------   moderate generalized slowing                                                                 bifrontal slowing  Interictal activity---------  large amount of diffusely expressed triphasic waves that some are clealy epileptiform in nature and are stimulus dependent. This activity occasionally appears in psuedo-periodic pattern    Events---------------  none    Seizures------------  none    Impression:  1- encephalopathy  2- potential for seizure that is not lateralizing    Plan -  discussed with the NCC team. plan as/ the NCC team        ABG:ABG - ( 19 Apr 2021 10:34 )  pH, Arterial: 7.45  pH, Blood: x     /  pCO2: 42    /  pO2: 128   / HCO3: 29    / Base Excess: 4.9   /  SaO2: 99            Mode: AC/ CMV (Assist Control/ Continuous Mandatory Ventilation)  RR (machine): 12  TV (machine): 350  FiO2: 30  PEEP: 5

## 2021-04-19 NOTE — CHART NOTE - NSCHARTNOTEFT_GEN_A_CORE
Verbal permission obtained from Ely Reeves - [phone] (daughter + proxy) to do pelvic exam.  Patient follows with Dr. Rodriguez (last inpatient encounter on 12/20)    Vaginal exam: Complete uterovagnial prolapse, reducible, 2cm posterior ulcer, non-bleeding distal from cervical os.     GYN Recommendations:  premarin 1g vaginally (to uterovaginal prolapse) each night for 2 weeks    Dr. Rodriguez aware Verbal permission obtained from Ely Chowzenaida - [phone] (daughter + proxy) to do pelvic exam.  Patient follows with Dr. Rodriguez (last inpatient encounter on 12/20)    Vaginal exam: Complete uterovagnial prolapse, reducible, with likely pressure ulcer, 3cm in size new onset, non-bleeding, 2cm from distal from cervical os.     GYN Recommendations:  premarin 1g vaginally (to uterovaginal prolapse) each night for 2 weeks  -reduce pressure from posterior aspect of prolapse by changing position   -keep clean   -apply estrogen cream nightly  -vitamin A+D oitment corby as well       Dr. Rodriguez aware

## 2021-04-19 NOTE — CONSULT NOTE ADULT - CONSULT REASON
bladder prolapse
implantation of BiV ICD
neck and face wound
Seizure
Trach and PEG placement
Perirectal abscess
ESRD
cardiopulm arrest
Heart Failure
Right hip fracture, right proximal humerus fracture
decreased signals of left lower extremity
enteral feeding
vaginal bleeding
C2 fracture
Cardiac arrest
Adenovirus; needs AICD

## 2021-04-19 NOTE — PROGRESS NOTE ADULT - SUBJECTIVE AND OBJECTIVE BOX
Patient is a 70y old  Female who presents with a chief complaint of fall/syncope with multiple fractures (18 Apr 2021 15:20)        Pt remains critically ill in ICU on MV        ROS:     All ROS are negative except HPI         PHYSICAL EXAM    ICU Vital Signs Last 24 Hrs  T(C): 36.9 (19 Apr 2021 12:00), Max: 37.6 (18 Apr 2021 20:00)  T(F): 98.5 (19 Apr 2021 12:00), Max: 99.6 (18 Apr 2021 20:00)  HR: 98 (19 Apr 2021 13:00) (80 - 128)  BP: --  BP(mean): --  ABP: 126/60 (19 Apr 2021 13:00) (106/48 - 136/62)  ABP(mean): 80 (19 Apr 2021 13:00) (68 - 90)  RR: 96 (19 Apr 2021 13:00) (12 - 96)  SpO2: 100% (19 Apr 2021 13:00) (99% - 100%)      CONSTITUTIONAL:  Pt remains critically ill on MV    ENT:   pos et tube    EYES:   Pupils equal,   Round and reactive to light.    CARDIAC:   Normal rate,   Regular rhythm.    No edema      Vascular:  Normal systolic impulse  No Carotid bruits    RESPIRATORY:   No wheezing  Bilateral BS  Normal chest expansion  Not tachypneic,  No use of accessory muscles    GASTROINTESTINAL:  Abdomen soft,   Non-tender,   No guarding,   + BS    MUSCULOSKELETAL:   Range of motion is not limited,  No clubbing, cyanosis    NEUROLOGICAL:   Alert and oriented   No motor  deficits.    SKIN:   Skin normal color for race,   Warm and dry and intact.   No evidence of rash.    PSYCHIATRIC:   Normal mood and affect.   No apparent risk to self or others.    HEMATOLOGICAL:  No cervical  lymphadenopathy.  no inguinal lymphadenopathy      04-18-21 @ 07:01  -  04-19-21 @ 07:00  --------------------------------------------------------  IN:    Dexmedetomidine: 151.8 mL    Enteral Tube Flush: 120 mL    IV PiggyBack: 250 mL    Peptamen A.F.: 1080 mL    Phenylephrine: 94 mL  Total IN: 1695.8 mL    OUT:    Indwelling Catheter - Urethral (mL): 10 mL    Voided (mL): 25 mL  Total OUT: 35 mL    Total NET: 1660.8 mL      04-19-21 @ 07:01  -  04-19-21 @ 15:05  --------------------------------------------------------  IN:    Phenylephrine: 6 mL  Total IN: 6 mL    OUT:  Total OUT: 0 mL    Total NET: 6 mL          LABS:                            7.8    10.81 )-----------( 323      ( 19 Apr 2021 05:25 )             25.0                                               04-19    143  |  104  |  64<HH>  ----------------------------<  213<H>  4.4   |  25  |  3.4<H>    Ca    8.7      19 Apr 2021 05:25  Phos  2.4     04-19  Mg     2.4     04-19    TPro  5.1<L>  /  Alb  2.7<L>  /  TBili  0.3  /  DBili  x   /  AST  13  /  ALT  <5  /  AlkPhos  141<H>  04-19      PT/INR - ( 19 Apr 2021 05:25 )   PT: 14.80 sec;   INR: 1.29 ratio         PTT - ( 19 Apr 2021 05:25 )  PTT:26.6 sec                                                                                     LIVER FUNCTIONS - ( 19 Apr 2021 05:25 )  Alb: 2.7 g/dL / Pro: 5.1 g/dL / ALK PHOS: 141 U/L / ALT: <5 U/L / AST: 13 U/L / GGT: x                                                                                               Mode: AC/ CMV (Assist Control/ Continuous Mandatory Ventilation)  RR (machine): 12  TV (machine): 350  FiO2: 30  PEEP: 5                                      ABG - ( 19 Apr 2021 10:34 )  pH, Arterial: 7.45  pH, Blood: x     /  pCO2: 42    /  pO2: 128   / HCO3: 29    / Base Excess: 4.9   /  SaO2: 99                  MEDICATIONS  (STANDING):  aMIOdarone    Tablet 200 milliGRAM(s) Oral daily  aspirin  chewable 81 milliGRAM(s) Oral daily  atorvastatin 40 milliGRAM(s) Oral at bedtime  BACItracin   Ointment 1 Application(s) Topical every 12 hours  chlorhexidine 0.12% Liquid 15 milliLiter(s) Oral Mucosa every 12 hours  collagenase Ointment 1 Application(s) Topical two times a day  dexMEDEtomidine Infusion 0.228 MICROgram(s)/kG/Hr (3.3 mL/Hr) IV Continuous <Continuous>  dextrose 40% Gel 15 Gram(s) Oral once  dextrose 5%. 1000 milliLiter(s) (50 mL/Hr) IV Continuous <Continuous>  dextrose 5%. 1000 milliLiter(s) (100 mL/Hr) IV Continuous <Continuous>  dextrose 50% Injectable 25 Gram(s) IV Push once  dextrose 50% Injectable 12.5 Gram(s) IV Push once  dextrose 50% Injectable 25 Gram(s) IV Push once  doxercalciferol Injectable 2 MICROGram(s) IV Push <User Schedule>  epoetin mana-epbx (RETACRIT) Injectable 96164 Unit(s) IV Push <User Schedule>  estrogens  Cream 1 Gram(s) Vaginal at bedtime  fentaNYL   Patch  25 MICROgram(s)/Hr 1 Patch Transdermal every 72 hours  glucagon  Injectable 1 milliGRAM(s) IntraMuscular once  heparin  Infusion 1000 Unit(s)/Hr (10 mL/Hr) IV Continuous <Continuous>  insulin lispro (ADMELOG) corrective regimen sliding scale   SubCutaneous Before meals and at bedtime  lactulose Syrup 15 Gram(s) Oral three times a day  levETIRAcetam  IVPB 375 milliGRAM(s) IV Intermittent every 12 hours  levETIRAcetam  IVPB 250 milliGRAM(s) IV Intermittent <User Schedule>  midodrine 10 milliGRAM(s) Oral every 8 hours  pantoprazole  Injectable 40 milliGRAM(s) IV Push daily  phenylephrine    Infusion 0.1 MICROgram(s)/kG/Min (1.09 mL/Hr) IV Continuous <Continuous>  polyethylene glycol 3350 17 Gram(s) Oral at bedtime  senna 2 Tablet(s) Oral at bedtime  sertraline 50 milliGRAM(s) Oral daily  valproic  acid Syrup 250 milliGRAM(s) Oral two times a day    MEDICATIONS  (PRN):  acetaminophen   Tablet .. 650 milliGRAM(s) Oral every 6 hours PRN Temp greater or equal to 38C (100.4F)  morphine  - Injectable 2 milliGRAM(s) IV Push every 4 hours PRN Severe Pain (7 - 10)      New X-rays reviewed:                                                                                  ECHO    CXR interpreted by me:

## 2021-04-19 NOTE — PROCEDURE NOTE - SUPERVISORY STATEMENT
RIJ TLC place via US guidance, using sterile technique after consent obtained, no apparent complications noted.  Not including critical care time

## 2021-04-19 NOTE — PROGRESS NOTE ADULT - SUBJECTIVE AND OBJECTIVE BOX
[place] NEPHROLOGY FOLLOW UP NOTE  --------------------------------------------------------------------------------  24 hour events/subjective: Patient examined. Intubated.    PAST HISTORY  --------------------------------------------------------------------------------  No significant changes to PMH, PSH, FHx, SHx, unless otherwise noted    ALLERGIES & MEDICATIONS  --------------------------------------------------------------------------------  Allergies    No Known Allergies    Standing Inpatient Medications  aMIOdarone    Tablet 200 milliGRAM(s) Oral daily  aspirin  chewable 81 milliGRAM(s) Oral daily  atorvastatin 40 milliGRAM(s) Oral at bedtime  BACItracin   Ointment 1 Application(s) Topical every 12 hours  chlorhexidine 0.12% Liquid 15 milliLiter(s) Oral Mucosa every 12 hours  collagenase Ointment 1 Application(s) Topical two times a day  dexMEDEtomidine Infusion 0.228 MICROgram(s)/kG/Hr IV Continuous <Continuous>  dextrose 40% Gel 15 Gram(s) Oral once  dextrose 5%. 1000 milliLiter(s) IV Continuous <Continuous>  dextrose 5%. 1000 milliLiter(s) IV Continuous <Continuous>  dextrose 50% Injectable 25 Gram(s) IV Push once  dextrose 50% Injectable 12.5 Gram(s) IV Push once  dextrose 50% Injectable 25 Gram(s) IV Push once  doxercalciferol Injectable 2 MICROGram(s) IV Push <User Schedule>  epoetin mana-epbx (RETACRIT) Injectable 87359 Unit(s) IV Push <User Schedule>  estrogens  Cream 1 Gram(s) Vaginal at bedtime  fentaNYL   Patch  25 MICROgram(s)/Hr 1 Patch Transdermal every 72 hours  glucagon  Injectable 1 milliGRAM(s) IntraMuscular once  heparin  Infusion 1000 Unit(s)/Hr IV Continuous <Continuous>  insulin lispro (ADMELOG) corrective regimen sliding scale   SubCutaneous Before meals and at bedtime  lactulose Syrup 15 Gram(s) Oral three times a day  levETIRAcetam  IVPB 375 milliGRAM(s) IV Intermittent every 12 hours  levETIRAcetam  IVPB 250 milliGRAM(s) IV Intermittent <User Schedule>  midodrine 10 milliGRAM(s) Oral every 8 hours  pantoprazole  Injectable 40 milliGRAM(s) IV Push daily  phenylephrine    Infusion 0.1 MICROgram(s)/kG/Min IV Continuous <Continuous>  polyethylene glycol 3350 17 Gram(s) Oral at bedtime  senna 2 Tablet(s) Oral at bedtime  sertraline 50 milliGRAM(s) Oral daily  valproic  acid Syrup 250 milliGRAM(s) Oral two times a day    PRN Inpatient Medications  acetaminophen   Tablet .. 650 milliGRAM(s) Oral every 6 hours PRN  morphine  - Injectable 2 milliGRAM(s) IV Push every 4 hours PRN      VITALS/PHYSICAL EXAM  --------------------------------------------------------------------------------  T(C): 37.2 (04-19-21 @ 08:00), Max: 37.6 (04-18-21 @ 20:00)  HR: 86 (04-19-21 @ 10:00) (80 - 128)  BP: --  RR: 18 (04-19-21 @ 10:00) (12 - 70)  SpO2: 100% (04-19-21 @ 10:00) (99% - 100%)  Wt(kg): --        04-18-21 @ 07:01  -  04-19-21 @ 07:00  --------------------------------------------------------  IN: 1695.8 mL / OUT: 35 mL / NET: 1660.8 mL    04-19-21 @ 07:01  -  04-19-21 @ 12:26  --------------------------------------------------------  IN: 6 mL / OUT: 0 mL / NET: 6 mL      Physical Exam:  	Gen: Intubated  	Pulm:  B/L rales  	CV: RRR, S1S2  	Abd: +BS, soft, nondistended  	: Ravinder  	LE: Warm,  edema  	Vascular access: TDC    LABS/STUDIES  --------------------------------------------------------------------------------              7.8    10.81 >-----------<  323      [04-19-21 @ 05:25]              25.0     143  |  104  |  64  ----------------------------<  213      [04-19-21 @ 05:25]  4.4   |  25  |  3.4        Ca     8.7     [04-19-21 @ 05:25]      Mg     2.4     [04-19-21 @ 05:25]      Phos  2.4     [04-19-21 @ 05:25]    TPro  5.1  /  Alb  2.7  /  TBili  0.3  /  DBili  x   /  AST  13  /  ALT  <5  /  AlkPhos  141  [04-19-21 @ 05:25]    PT/INR: PT 14.80, INR 1.29       [04-19-21 @ 05:25]  PTT: 26.6       [04-19-21 @ 05:25]      Creatinine Trend:  SCr 3.4 [04-19 @ 05:25]  SCr 2.9 [04-18 @ 04:30]  SCr 3.6 [04-17 @ 04:27]  SCr 3.0 [04-16 @ 04:30]  SCr 3.8 [04-15 @ 04:45]    Urinalysis - [04-02-21 @ 10:44]      Color Yellow / Appearance Turbid / SG 1.027 / pH 7.0      Gluc Negative / Ketone Negative  / Bili Negative / Urobili <2 mg/dL       Blood Moderate / Protein 100 mg/dL / Leuk Est Large / Nitrite Negative      RBC 14 / WBC >720 / Hyaline 16 / Gran  / Sq Epi  / Non Sq Epi 3 / Bacteria Moderate      Iron 55, TIBC 167, %sat 33      [05-27-20 @ 13:22]  Ferritin 636      [05-27-20 @ 13:22]  HbA1c 5.7      [11-09-19 @ 01:20]  TSH 1.71      [04-06-21 @ 04:40]

## 2021-04-19 NOTE — PROGRESS NOTE ADULT - ASSESSMENT
1. Cardiac Arrest: secondary to VFIB in nature, continue supportive care, monitor for now. Monitor for now.    2. Acute Hypoxic Respiratory Failure: secondary to septic/ cardiogenic shock, PNA, continue on lung protective mechanical ventilation for now, will wean FIO2 for sats greater then 92%,  tx nebs, pulmonary toilet, continue supportive care monitor for now. Discussed with family will need tracheostomy, f/u surgery regarding timing    3. Mixed Cardiogenic/Septic Shock: secondary to PNA, will continue empiric abx as per ID, on Neosynephrine gtt will wean for maps greater then 65, continue monitor for now.    4. Acute Biventricular CHF EF 38% in setting of Severe Aortic stenosis and Mod-Severe MR: continue supportive care s/p BiVICD by EP, monitor for now.    5. PNA: s/p course of abx as per ID, continue pulmonary toilet tx nebs, monitor for now,    6. VFIB: tx amio daily, most likley ischemic in nature. F/u Cardiology recs.    7. B/l Rib Fractures: from fall continue pain control.    8. Right Humeral Fracture: continue supportive care as per Ortho, monitor for now.    9. Right Intertrochanteric Fracture: continue supportive care as per Ortho, s/p Intramedullary rodding of fracture of right femur   monitor for now.     10. Odontoid Fracture: continue nec collar as per Neurology f/u Recs.    11. Seizures: tx AED's as per Neurology monitor for now.    12. Anemia: secondary to hip fracture and AOC transfuse as needed for less then 7, continue supportive care, monitor for now.    13. Sacral Decubitus/ Abscess s/p I/D, f/u surgery recs.    14. CAD s/p CABG: tx ASA, continue supportive care, monitor for now.     15. Bladder Prolapse with Chronic Castellon: continue Castellon monitor for now.    16. DM: tx insulin sliding scale, monitor BSG's, continue supportive care.    17. ESRD on HD: continue renal replacement therapy as per nephrology, monitor for now.    18. DVT/ GI px: tx PPI/ hep gtt.     19. NUT: tx TF's.    20. Afib: tx amio oral, continue supportive care, monitor for now.    21. Left upper ext DVT: tx hep gtt continue supportive care, monitor for now.    Critical Care Time not including procedures 33 mins    Discussed case with  family

## 2021-04-19 NOTE — CONSULT NOTE ADULT - ASSESSMENT
ASSESSMENT:  69 yo F with PMH of CAD s/p CABG (November 2019), HFrEF (EF 20%), AS, ESRD (MWF), bladder prolapse requiring chronic Castellon, Diabetes Type 2 on insulin presents from home s/p mechanical fall. CT surgery consulted for placement of Tracheostomy and PEG . Physical exam findings, imaging, and labs as documented above.     PLAN:    -Patient seen and examined at bedside for Trach and PEG planning  -Pre op labs and active type screen prior to planned date  -Primary team discussed plans for Trach and PEG with family  -Will discuss with attending Dr. Cooper Riojas  -CT surgery following    EXT [id number] ASSESSMENT:  69 yo F with PMH of CAD s/p CABG (November 2019), HFrEF (EF 20%), AS, ESRD (MWF), bladder prolapse requiring chronic Castellon, Diabetes Type 2 on insulin presents from home s/p mechanical fall. CT surgery consulted for placement of Tracheostomy and PEG . Physical exam findings, imaging, and labs as documented above.     PLAN:    -Patient seen and examined at bedside for Trach and PEG planning  -Pre op labs and active type screen prior to planned date  -Primary team discussed plans for Trach and PEG with family  -Will discuss with attending Dr. oCoper Riojas  -CT surgery following    EXT [id number]    Senior Resident Addendum  As above, case to be d/w Dr. Cooper Riojas, will need medical risk stratification prior to OR, heparin gtt will need by held 6h prior to case.

## 2021-04-20 NOTE — PROGRESS NOTE ADULT - SUBJECTIVE AND OBJECTIVE BOX
Patient is a 70y old  Female who presents with a chief complaint of fall/syncope with multiple fractures (18 Apr 2021 15:20)      Pt remains critically ill in ICU on MV        ROS:     All ROS are negative except HPI         PHYSICAL EXAM    ICU Vital Signs Last 24 Hrs  T(C): 36.8 (20 Apr 2021 08:00), Max: 37.6 (20 Apr 2021 00:00)  T(F): 98.2 (20 Apr 2021 08:00), Max: 99.6 (20 Apr 2021 00:00)  HR: 82 (20 Apr 2021 11:20) (54 - 104)  BP: 144/57 (20 Apr 2021 06:12) (144/57 - 144/57)  BP(mean): --  ABP: 132/57 (20 Apr 2021 11:20) (78/66 - 162/70)  ABP(mean): 90 (20 Apr 2021 11:00) (66 - 104)  RR: 19 (20 Apr 2021 11:20) (12 - 145)  SpO2: 100% (20 Apr 2021 11:20) (100% - 100%)      CONSTITUTIONAL:  Pt remains critically ill in ICU on MV    ENT:   pos et tube    EYES:   Pupils equal,   Round and reactive to light.    CARDIAC:   Normal rate,   Regular rhythm.    No edema      Vascular:  Normal systolic impulse  No Carotid bruits    RESPIRATORY:   No wheezing  Bilateral BS  Normal chest expansion  Not tachypneic,  No use of accessory muscles    GASTROINTESTINAL:  Abdomen soft,   Non-tender,   No guarding,   + BS    MUSCULOSKELETAL:   Range of motion is not limited,  No clubbing, cyanosis    NEUROLOGICAL:   Alert and oriented   No motor  deficits.    SKIN:   Skin normal color for race,   Warm and dry and intact.   No evidence of rash.    PSYCHIATRIC:   Normal mood and affect.   No apparent risk to self or others.    HEMATOLOGICAL:  No cervical  lymphadenopathy.  no inguinal lymphadenopathy      04-19-21 @ 07:01  -  04-20-21 @ 07:00  --------------------------------------------------------  IN:    Dexmedetomidine: 170 mL    Heparin: 210 mL    IV PiggyBack: 100 mL    Peptamen A.F.: 990 mL    Phenylephrine: 28 mL  Total IN: 1498 mL    OUT:    Indwelling Catheter - Urethral (mL): 55 mL  Total OUT: 55 mL    Total NET: 1443 mL      04-20-21 @ 07:01  -  04-20-21 @ 12:49  --------------------------------------------------------  IN:    Dexmedetomidine: 50 mL    Heparin: 26 mL    Phenylephrine: 3 mL  Total IN: 79 mL    OUT:    Indwelling Catheter - Urethral (mL): 5 mL    Other (mL): 2000 mL  Total OUT: 2005 mL    Total NET: -1926 mL          LABS:                            7.4    7.59  )-----------( 242      ( 20 Apr 2021 04:50 )             24.1                                               04-20    140  |  104  |  82<HH>  ----------------------------<  213<H>  4.4   |  25  |  3.8<H>    Ca    8.3<L>      20 Apr 2021 04:50  Phos  2.1     04-20  Mg     2.4     04-20    TPro  4.7<L>  /  Alb  2.3<L>  /  TBili  0.4  /  DBili  x   /  AST  10  /  ALT  5   /  AlkPhos  133<H>  04-20      PT/INR - ( 20 Apr 2021 04:50 )   PT: 14.70 sec;   INR: 1.28 ratio         PTT - ( 20 Apr 2021 04:50 )  PTT:46.3 sec                                                                                     LIVER FUNCTIONS - ( 20 Apr 2021 04:50 )  Alb: 2.3 g/dL / Pro: 4.7 g/dL / ALK PHOS: 133 U/L / ALT: 5 U/L / AST: 10 U/L / GGT: x                                                                                               Mode: AC/ CMV (Assist Control/ Continuous Mandatory Ventilation)  RR (machine): 12  TV (machine): 350  FiO2: 30  PEEP: 5  ITime: 1  MAP: 10  PIP: 19                                      ABG - ( 20 Apr 2021 03:02 )  pH, Arterial: 7.42  pH, Blood: x     /  pCO2: 43    /  pO2: 142   / HCO3: 28    / Base Excess: 3.6   /  SaO2: 100                 MEDICATIONS  (STANDING):  aMIOdarone    Tablet 200 milliGRAM(s) Oral daily  aspirin  chewable 81 milliGRAM(s) Oral daily  atorvastatin 40 milliGRAM(s) Oral at bedtime  BACItracin   Ointment 1 Application(s) Topical every 12 hours  chlorhexidine 0.12% Liquid 15 milliLiter(s) Oral Mucosa every 12 hours  collagenase Ointment 1 Application(s) Topical two times a day  dexMEDEtomidine Infusion 0.228 MICROgram(s)/kG/Hr (3.3 mL/Hr) IV Continuous <Continuous>  dextrose 40% Gel 15 Gram(s) Oral once  dextrose 5%. 1000 milliLiter(s) (50 mL/Hr) IV Continuous <Continuous>  dextrose 5%. 1000 milliLiter(s) (100 mL/Hr) IV Continuous <Continuous>  dextrose 50% Injectable 25 Gram(s) IV Push once  dextrose 50% Injectable 12.5 Gram(s) IV Push once  dextrose 50% Injectable 25 Gram(s) IV Push once  doxercalciferol Injectable 2 MICROGram(s) IV Push <User Schedule>  epoetin mana-epbx (RETACRIT) Injectable 55091 Unit(s) IV Push <User Schedule>  estrogens  Cream 1 Gram(s) Vaginal at bedtime  fentaNYL   Patch  25 MICROgram(s)/Hr 1 Patch Transdermal every 72 hours  glucagon  Injectable 1 milliGRAM(s) IntraMuscular once  heparin  Infusion 1300 Unit(s)/Hr (13 mL/Hr) IV Continuous <Continuous>  insulin lispro (ADMELOG) corrective regimen sliding scale   SubCutaneous Before meals and at bedtime  lactulose Syrup 15 Gram(s) Oral three times a day  levETIRAcetam  IVPB 375 milliGRAM(s) IV Intermittent every 12 hours  levETIRAcetam  IVPB 250 milliGRAM(s) IV Intermittent <User Schedule>  midodrine 10 milliGRAM(s) Oral every 8 hours  pantoprazole  Injectable 40 milliGRAM(s) IV Push daily  polyethylene glycol 3350 17 Gram(s) Oral at bedtime  senna 2 Tablet(s) Oral at bedtime  sertraline 50 milliGRAM(s) Oral daily  valproic  acid Syrup 250 milliGRAM(s) Oral two times a day    MEDICATIONS  (PRN):  acetaminophen   Tablet .. 650 milliGRAM(s) Oral every 6 hours PRN Temp greater or equal to 38C (100.4F)  morphine  - Injectable 2 milliGRAM(s) IV Push every 4 hours PRN Severe Pain (7 - 10)      New X-rays reviewed:                                                                                  ECHO    CXR interpreted by me:

## 2021-04-20 NOTE — PROGRESS NOTE ADULT - ASSESSMENT
71 yo F with PMH of CAD s/p CABG (November 2019), HFrEF (EF 20%), AS, ESRD (MWF), bladder prolapse requiring chronic Castellon, Diabetes Type 2 on insulin presents from home s/p mechanical fall. surgery consulted for placement of Tracheostomy and PEG . Physical exam findings, imaging, and labs as documented above.     PLAN:    -Patient seen and examined at bedside for Trach and PEG planning  -Pre op labs and active type screen prior to planned date  -Primary team discussed plans for Trach and PEG with family  - CT surgery has deferred trach and PEG to general surgery   - general surgery will proceed with trach and peg planning  - Please make patient NPO after midnight.  - Patient should be on IV Fluids once made NPO at midnight.  - Please order 8 PM labs for the patient, including CBC, BMP, Mg, Phos, PT/PTT/INR, and Type and Screen.   - This will give adequate time to replete electrolyte disturbances.  - Please replete K to 4.0, Mg to 2.0, and Phos to 3.0  - Ensure EKG and CXR have been done in the last 1 week.   - up to date covid test  - Please obtain medicine and cardiology clearance for pre op risk stratification, pt scheduled for OR 4/21 trach and peg    EXT [id number]      Date/Time: 04-20-21 @ 12:13

## 2021-04-20 NOTE — PRE-ANESTHESIA EVALUATION ADULT - NSRADCARDRESULTSFT_GEN_ALL_CORE
TTE / JAMILA: < from: TTE Echo Complete w/o Contrast w/ Doppler (04.04.21 @ 10:24) >  Summary:   1. LV Ejection Fraction by Renner's Method with a biplane EF of 38 %.   2. Moderately decreased global left ventricular systolic function.   3. Entire inferior wall appears hypokinetic.   4. Mild concentric left ventricular hypertrophy.   5. Severely reduced RV systolic function.   6. Severely enlarged right ventricle.   7. Severely enlarged left atrium.   8. Peak transaortic gradient equals 48.8 mmHg, mean transaortic gradient equals 24.7 mmHg, the calculated aortic valve area equals 0.75 cm² by the continuity equation consistent with severe aortic stenosis.   9. The mitral valve leaflets are tethered due to reduced systolic function and elevated LVDP.  10. Degenerative mitral valve.  11. Moderate mitral annular calcification.  12. Severe mitral regurgitation.  13. Moderate tricuspid regurgitation.  14. Estimated pulmonary artery systolic pressure is 49.7 mmHg assuming a right atrial pressure of 15 mmHg, which is consistent with mild pulmonary hypertension.
Summary:   1. LV Ejection Fraction by Renner's Method with a biplane EF of 38 %.   2. Moderately decreased global left ventricular systolic function.   3. Entire inferior wall appears hypokinetic.   4. Mild concentric left ventricular hypertrophy.   5. Severely reduced RV systolic function.   6. Severely enlarged right ventricle.   7. Severely enlarged left atrium.   8. Peak transaortic gradient equals 48.8 mmHg, mean transaortic gradient equals 24.7 mmHg, the calculated aortic valve area equals 0.75 cm² by the continuity equation consistent with severe aortic stenosis.   9. The mitral valve leaflets are tethered due to reduced systolic function and elevated LVDP.  10. Degenerative mitral valve.  11. Moderate mitral annular calcification.  12. Severe mitral regurgitation.  13. Moderate tricuspid regurgitation.  14. Estimated pulmonary artery systolic pressure is 49.7 mmHg assuming a right atrial pressure of 15 mmHg, which is consistent with mild pulmonary hypertension.
Summary:   1. LV Ejection Fraction by Renner's Method with a biplane EF of 38 %.   2. Moderately decreased global left ventricular systolic function.   3. Entire inferior wall appears hypokinetic.   4. Mild concentric left ventricular hypertrophy.   5. Severely reduced RV systolic function.   6. Severely enlarged right ventricle.   7. Severely enlarged left atrium.   8. Peak transaortic gradient equals 48.8 mmHg, mean transaortic gradient equals 24.7 mmHg, the calculated aortic valve area equals 0.75 cm² by the continuity equation consistent with severe aortic stenosis.   9. The mitral valve leaflets are tethered due to reduced systolic function and elevated LVDP.  10. Degenerative mitral valve.  11. Moderate mitral annular calcification.  12. Severe mitral regurgitation.  13. Moderate tricuspid regurgitation.  14. Estimated pulmonary artery systolic pressure is 49.7 mmHg assuming a right atrial pressure of 15 mmHg, which is consistent with mild pulmonary hypertension.

## 2021-04-20 NOTE — PROGRESS NOTE ADULT - SUBJECTIVE AND OBJECTIVE BOX
GUS WILBURN 70y Female  MRN#: [id number]       SUBJECTIVE  Patient is a 70y old Female who presents with a chief complaint of fall/syncope with multiple fractures (18 Apr 2021 15:20)      ***    Today is hospital day 19d, and this morning she is _.    No acute overnight events.     OBJECTIVE  PAST MEDICAL & SURGICAL HISTORY  Diabetes    Congestive heart failure (CHF)    Pleural effusion due to congestive heart failure    End stage renal disease  on dialysis Mon, Wed, Fr    Uterine prolapse    Chronic indwelling Castellon catheter    History of repair of hip fracture  2019    S/P CABG (coronary artery bypass graft)  11/2019    History of thoracentesis    Chronic indwelling Castellon catheter      ALLERGIES:  No Known Allergies    MEDICATIONS:  STANDING MEDICATIONS  aMIOdarone    Tablet 200 milliGRAM(s) Oral daily  aspirin  chewable 81 milliGRAM(s) Oral daily  atorvastatin 40 milliGRAM(s) Oral at bedtime  BACItracin   Ointment 1 Application(s) Topical every 12 hours  chlorhexidine 0.12% Liquid 15 milliLiter(s) Oral Mucosa every 12 hours  collagenase Ointment 1 Application(s) Topical two times a day  dexMEDEtomidine Infusion 0.228 MICROgram(s)/kG/Hr IV Continuous <Continuous>  dextrose 40% Gel 15 Gram(s) Oral once  dextrose 5%. 1000 milliLiter(s) IV Continuous <Continuous>  dextrose 5%. 1000 milliLiter(s) IV Continuous <Continuous>  dextrose 50% Injectable 25 Gram(s) IV Push once  dextrose 50% Injectable 12.5 Gram(s) IV Push once  dextrose 50% Injectable 25 Gram(s) IV Push once  doxercalciferol Injectable 2 MICROGram(s) IV Push <User Schedule>  epoetin mana-epbx (RETACRIT) Injectable 50530 Unit(s) IV Push <User Schedule>  estrogens  Cream 1 Gram(s) Vaginal at bedtime  fentaNYL   Patch  25 MICROgram(s)/Hr 1 Patch Transdermal every 72 hours  glucagon  Injectable 1 milliGRAM(s) IntraMuscular once  insulin lispro (ADMELOG) corrective regimen sliding scale   SubCutaneous Before meals and at bedtime  lactulose Syrup 15 Gram(s) Oral three times a day  levETIRAcetam  IVPB 375 milliGRAM(s) IV Intermittent every 12 hours  levETIRAcetam  IVPB 250 milliGRAM(s) IV Intermittent <User Schedule>  midodrine 10 milliGRAM(s) Oral every 8 hours  pantoprazole  Injectable 40 milliGRAM(s) IV Push daily  phenylephrine    Infusion 0.1 MICROgram(s)/kG/Min IV Continuous <Continuous>  polyethylene glycol 3350 17 Gram(s) Oral at bedtime  senna 2 Tablet(s) Oral at bedtime  sertraline 50 milliGRAM(s) Oral daily  valproic  acid Syrup 250 milliGRAM(s) Oral two times a day    PRN MEDICATIONS  acetaminophen   Tablet .. 650 milliGRAM(s) Oral every 6 hours PRN  morphine  - Injectable 2 milliGRAM(s) IV Push every 4 hours PRN      VITAL SIGNS: Last 24 Hours  T(C): 37.2 (20 Apr 2021 06:12), Max: 37.6 (20 Apr 2021 00:00)  T(F): 98.9 (20 Apr 2021 06:12), Max: 99.6 (20 Apr 2021 00:00)  HR: 78 (20 Apr 2021 07:00) (54 - 104)  BP: 144/57 (20 Apr 2021 06:12) (144/57 - 144/57)  BP(mean): --  RR: 12 (20 Apr 2021 07:00) (12 - 145)  SpO2: 100% (20 Apr 2021 07:00) (100% - 100%)    LABS:                        7.4    7.59  )-----------( 242      ( 20 Apr 2021 04:50 )             24.1     04-20    140  |  104  |  82<HH>  ----------------------------<  213<H>  4.4   |  25  |  3.8<H>    Ca    8.3<L>      20 Apr 2021 04:50  Phos  2.1     04-20  Mg     2.4     04-20    TPro  4.7<L>  /  Alb  2.3<L>  /  TBili  0.4  /  DBili  x   /  AST  10  /  ALT  5   /  AlkPhos  133<H>  04-20    PT/INR - ( 20 Apr 2021 04:50 )   PT: 14.70 sec;   INR: 1.28 ratio         PTT - ( 20 Apr 2021 04:50 )  PTT:46.3 sec    ABG - ( 20 Apr 2021 03:02 )  pH, Arterial: 7.42  pH, Blood: x     /  pCO2: 43    /  pO2: 142   / HCO3: 28    / Base Excess: 3.6   /  SaO2: 100                       RADIOLOGY: CXR reviewed TLC in place, tube in place      PHYSICAL EXAM:    GENERAL: NAD, well-developed, AAOx3  HEENT:  Atraumatic, Normocephalic. EOMI, PERRLA, conjunctiva and sclera clear, No JVD  PULMONARY: Clear to auscultation bilaterally; No wheeze  CARDIOVASCULAR: Regular rate and rhythm; No murmurs, rubs, or gallops  GASTROINTESTINAL: Soft, Nontender, Nondistended; Bowel sounds present  MUSCULOSKELETAL:  2+ Peripheral Pulses, No clubbing, cyanosis, or edema  NEUROLOGY: non-focal  SKIN: No rashes or lesions      ADMISSION SUMMARY  Patient is a 70y old Female who presents with a chief complaint of fall/syncope with multiple fractures (18 Apr 2021 15:20)

## 2021-04-20 NOTE — ADVANCED PRACTICE NURSE CONSULT - ASSESSMENT
71 yo F with PMH of CAD s/p CABG (November 2019), HFrEF (EF 20%), AS, ESRD (MWF), bladder prolapse requiring chronic Montejo, Diabetes Type 2 on insulin presents from home (4/1) s/p mechanical fall-found to have R humeral fracture / R hip fracture / non displaced odontoid fracture/ acute rib fractures/ ? T6 vertebral fracture.I n the ED patient arrested, ROSC achieved after 2 minutes of CPR. EKG at the time showed RBBB with wide complex tachy. Pt subsequently intubated and sedated, started on pressors. Pt Experienced seizure-like episode during weaning trial s/p 2mg Versed.  (4/2/21) Weaning trial passed. Patient was Extubated . 02 sat began to decrease to low 80s, tachypenic and cyanotic started on bipap with no improvement. Patient again became hypotensive and tachy. Went into Afib. s/p cardioversion x3 and started on amio.   Currently admitted to medicine, today is hospital day 19, patient in CCU, being managed for Cardiac Arrest: secondary to VFIB in nature & CHF exacerbation Acute Hypoxic Respiratory Failure: secondary to septic/ cardiogenic shock, PNA,; Mixed Cardiogenic/Septic Shock: secondary to PNA;  Acute Biventricular CHF EF 38% in setting of Severe Aortic stenosis and Mod-Severe MR; PNA; VFIB: B/L rib Fx; Right Humeral Fracture;  Right Intertrochanteric Fracture-, s/p Intramedullary rodding of fracture of right femur; Odontoid Fracture;  Seizures; Anemia: secondary to hip fracture and AOC ;  Sacral Decubitus; Perirectal abscess s/p I/D; CAD s/p CABG; . Uterine Prolapse with Chronic Montejo; Afib; DM; ESRD-on HD; neck ulceration; LUE DVT;.    Received patient in CCU, laying supine in bed, turned to left side pillow under right side, offloading boots to BLEs in place), HOB elevated 30 degrees. Pt awake, eyes open, intubated, C collar in place ABD pads & foam Allevyn dressings present underneath C collar-left chin & clavicle wounds being managed by Burn MD team. Pt w/ sling to RUE, post-op dressing & Prevena Wound VAC to RLE in place. Covering RN made aware of purpose of WOCN visit, agreeable to consult. GYN MD team also at bedside to assess uterine prolapse wound. With assistance from RN & GYN MD team, turned patient to right side for skin assessment. Foam Allevyn dressing to sacrococcygeal in place, dressing removed for assessment.     Type of wound: Stage 3 pressure injury; evolved from deep tissue pressure injury (DTPI)-as indicated in 4/17 wound photo in chart  Location: coccyx   Wound measurements: 2 wounds in close proximity to each other & measured together at 4cm x 6cm x 0.2cm  Tunneling/Undermining: none  Wound bed: marbelized w/ pink & yellow subcutaneous tissue   Wound edges: attached, flush, irregular  Periwound: slightly denuded, peeling   Wound exudate: none  Wound odor: none  Induration, erythema, warmth: none   Wound pain: no signs present    Patient bedbound, very limited mobility in bed, + montejo, incontinent of stool. Receiving TF via OGT.

## 2021-04-20 NOTE — PRE-ANESTHESIA EVALUATION ADULT - NSANTHPMHFT_GEN_ALL_CORE
71 yo F with PMH of CAD s/p CABG (November 2019), HFrEF (EF 20%), AS, ESRD (MWF), bladder prolapse requiring chronic Castellon, Diabetes Type 2 on insulin presents from home s/p mechanical fall. surgery consulted for placement of Tracheostomy and PEG . Physical exam findings, imaging, and labs as documented above.     PLAN:    -Patient seen and examined at bedside for Trach and PEG planning  -Pre op labs and active type screen prior to planned date  -Primary team discussed plans for Trach and PEG with family  - CT surgery has deferred trach and PEG to general surgery   - general surgery will proceed with trach and peg planning  - Please make patient NPO after midnight.  - Patient should be on IV Fluids once made NPO at midnight.  - Please order 8 PM labs for the patient, including CBC, BMP, Mg, Phos, PT/PTT/INR, and Type and Screen.   - This will give adequate time to replete electrolyte disturbances.  - Please replete K to 4.0, Mg to 2.0, and Phos to 3.0  - Ensure EKG and CXR have been done in the last 1 week.   - up to date covid test  - Please obtain medicine and cardiology clearance for pre op risk stratification, pt scheduled for OR 4/21 trach and peg 69 yo F with PMH of CAD s/p CABG (November 2019), HFrEF (EF 20%), AS, ESRD (MWF), bladder prolapse requiring chronic Castellon, Diabetes Type 2 on insulin presents from home s/p mechanical fall. surgery consulted for placement of Tracheostomy and PEG . Physical exam findings, imaging, and labs as documented above.     PLAN:    -Patient seen and examined at bedside for Trach and PEG planning  -Pre op labs and active type screen prior to planned date  -Primary team discussed plans for Trach and PEG with family  - CT surgery has deferred trach and PEG to general surgery   - general surgery will proceed with trach and peg planning  - Please make patient NPO after midnight.  - Patient should be on IV Fluids once made NPO at midnight.  - Please order 8 PM labs for the patient, including CBC, BMP, Mg, Phos, PT/PTT/INR, and Type and Screen.   - This will give adequate time to replete electrolyte disturbances.  - Please replete K to 4.0, Mg to 2.0, and Phos to 3.0  - Ensure EKG and CXR have been done in the last 1 week.   - up to date covid test  - Please obtain medicine and cardiology clearance for pre op risk stratification, pt scheduled for OR 4/21 trach and peg      Family can be reached at:   John Reeves: [phone]  Anant Reeves: [phone]

## 2021-04-20 NOTE — PRE-ANESTHESIA EVALUATION ADULT - NSANTHLABRESULTSFT_GEN_ALL_CORE
-Spoke with CCU -Spoke with CCU resident: Surgery had requested medicine and cardiology consult prior to procedure  -Reassess H/H prior to procedure - may need to transfuse 1 unit pRBC if H/H continues to drop.

## 2021-04-20 NOTE — PROGRESS NOTE ADULT - SUBJECTIVE AND OBJECTIVE BOX
Progress Note: General Surgery  Patient: GUS WILBURN , 70y (1950)Female   MRN: [id number]  Location: [address]  Visit: 04-01-21 Inpatient  Date: 04-20-21 @ 12:13    Admit Diagnosis/Chief Complaint: Personal history of sudden cardiac death (SCD) resuscitated    Cardiomyopathy    Afib    Hip fracture, right        Procedure/Diagnosis: Afib    Cardiomyopathy    Personal history of sudden cardiac death (SCD) resuscitated    Hip fracture, right     S/P Implantation of biventricular defibrillator    Intramedullary rodding of fracture of right femur        Events/ 24h: Patient seen and examined at bedside. No acute events overnight. Afebrile, VSS.    Vitals: T(F): 98.2 (04-20-21 @ 08:00), Max: 99.6 (04-20-21 @ 00:00)  HR: 82 (04-20-21 @ 11:20)  BP: 144/57 (04-20-21 @ 06:12) (144/57 - 144/57)  RR: 19 (04-20-21 @ 11:20)  SpO2: 100% (04-20-21 @ 11:20)  RR (machine): 12, TV (machine): 350, FiO2: 30, PEEP: 5, PIP: 19  In:   04-19-21 @ 07:01  -  04-20-21 @ 07:00  --------------------------------------------------------  IN: 1498 mL    04-20-21 @ 07:01  -  04-20-21 @ 12:13  --------------------------------------------------------  IN: 79 mL      Out:   04-19-21 @ 07:01  -  04-20-21 @ 07:00  --------------------------------------------------------  OUT:    Indwelling Catheter - Urethral (mL): 55 mL  Total OUT: 55 mL      04-20-21 @ 07:01  -  04-20-21 @ 12:13  --------------------------------------------------------  OUT:    Indwelling Catheter - Urethral (mL): 5 mL    Other (mL): 2000 mL  Total OUT: 2005 mL        Net:   04-19-21 @ 07:01  -  04-20-21 @ 07:00  --------------------------------------------------------  NET: 1443 mL    04-20-21 @ 07:01  -  04-20-21 @ 12:13  --------------------------------------------------------  NET: -1926 mL        Diet: Diet, NPO with Tube Feed:   Tube Feeding Modality: Orogastric  Peptamen A.F. Formula  Total Volume for 24 Hours (mL): 1080  Continuous  Until Goal Tube Feed Rate (mL per Hour): 45  Tube Feed Duration (in Hours): 24  Tube Feed Start Time: 19:00  Chris(7 Gm Arginine/7 Gm Glut/1.2 Gm HMB     Qty per Day:  2 (04-20-21 @ 09:03)    IV Fluids: dextrose 5%. 1000 milliLiter(s) (50 mL/Hr) IV Continuous <Continuous>  dextrose 5%. 1000 milliLiter(s) (100 mL/Hr) IV Continuous <Continuous>  doxercalciferol Injectable 2 MICROGram(s) IV Push <User Schedule>      Physical Examination:  General Appearance: ill appearing, intubated and sedated  HEENT: EOMI, sclera anicteric.  Heart: RRR   Lungs: Symmetric chest wall expansion, equal rise and fall.  Abdomen:  Soft, nontender, nondistended.   MSK/Extremities: Warm & well-perfused.   Skin: Warm, dry. No jaundice.       Medications: [Standing]  aMIOdarone    Tablet 200 milliGRAM(s) Oral daily  aspirin  chewable 81 milliGRAM(s) Oral daily  atorvastatin 40 milliGRAM(s) Oral at bedtime  BACItracin   Ointment 1 Application(s) Topical every 12 hours  chlorhexidine 0.12% Liquid 15 milliLiter(s) Oral Mucosa every 12 hours  collagenase Ointment 1 Application(s) Topical two times a day  dexMEDEtomidine Infusion 0.228 MICROgram(s)/kG/Hr (3.3 mL/Hr) IV Continuous <Continuous>  dextrose 40% Gel 15 Gram(s) Oral once  dextrose 5%. 1000 milliLiter(s) (50 mL/Hr) IV Continuous <Continuous>  dextrose 5%. 1000 milliLiter(s) (100 mL/Hr) IV Continuous <Continuous>  dextrose 50% Injectable 25 Gram(s) IV Push once  dextrose 50% Injectable 12.5 Gram(s) IV Push once  dextrose 50% Injectable 25 Gram(s) IV Push once  doxercalciferol Injectable 2 MICROGram(s) IV Push <User Schedule>  epoetin mana-epbx (RETACRIT) Injectable 84012 Unit(s) IV Push <User Schedule>  estrogens  Cream 1 Gram(s) Vaginal at bedtime  fentaNYL   Patch  25 MICROgram(s)/Hr 1 Patch Transdermal every 72 hours  glucagon  Injectable 1 milliGRAM(s) IntraMuscular once  heparin  Infusion 1300 Unit(s)/Hr (13 mL/Hr) IV Continuous <Continuous>  insulin lispro (ADMELOG) corrective regimen sliding scale   SubCutaneous Before meals and at bedtime  lactulose Syrup 15 Gram(s) Oral three times a day  levETIRAcetam  IVPB 375 milliGRAM(s) IV Intermittent every 12 hours  levETIRAcetam  IVPB 250 milliGRAM(s) IV Intermittent <User Schedule>  midodrine 10 milliGRAM(s) Oral every 8 hours  pantoprazole  Injectable 40 milliGRAM(s) IV Push daily  polyethylene glycol 3350 17 Gram(s) Oral at bedtime  senna 2 Tablet(s) Oral at bedtime  sertraline 50 milliGRAM(s) Oral daily  valproic  acid Syrup 250 milliGRAM(s) Oral two times a day    DVT Prophylaxis: heparin  Infusion 1300 Unit(s)/Hr IV Continuous <Continuous>    GI Prophylaxis: pantoprazole  Injectable 40 milliGRAM(s) IV Push daily    Antibiotics:   Anticoagulation:   Medications:[PRN]  acetaminophen   Tablet .. 650 milliGRAM(s) Oral every 6 hours PRN  morphine  - Injectable 2 milliGRAM(s) IV Push every 4 hours PRN      Labs:                        7.4    7.59  )-----------( 242      ( 20 Apr 2021 04:50 )             24.1     04-20    140  |  104  |  82<HH>  ----------------------------<  213<H>  4.4   |  25  |  3.8<H>    Ca    8.3<L>      20 Apr 2021 04:50  Phos  2.1     04-20  Mg     2.4     04-20    TPro  4.7<L>  /  Alb  2.3<L>  /  TBili  0.4  /  DBili  x   /  AST  10  /  ALT  5   /  AlkPhos  133<H>  04-20    LIVER FUNCTIONS - ( 20 Apr 2021 04:50 )  Alb: 2.3 g/dL / Pro: 4.7 g/dL / ALK PHOS: 133 U/L / ALT: 5 U/L / AST: 10 U/L / GGT: x           PT/INR - ( 20 Apr 2021 04:50 )   PT: 14.70 sec;   INR: 1.28 ratio         PTT - ( 20 Apr 2021 04:50 )  PTT:46.3 sec  ABG - ( 20 Apr 2021 03:02 )  pH: 7.42  /  pCO2: 43    /  pO2: 142   / HCO3: 28    / Base Excess: 3.6   /  SaO2: 100                   Imaging:   < from: Xray Chest 1 View- PORTABLE-Routine (Xray Chest 1 View- PORTABLE-Routine in AM.) (04.20.21 @ 05:58) >  Impression:    Stable bibasilar opacities.  Stable support devices.    < end of copied text >

## 2021-04-20 NOTE — CHART NOTE - NSCHARTNOTEFT_GEN_A_CORE
OBGYN PGY4 Note:     Pt seen and evaluated at bedside. Complete uterovaginal prolapse noted with 3cm decubitus ulcer noted 2cm distal to cervical os; no evidence of necrosis/foul smelling discharge/active bleeding noted.     Plan:   - xeroform dressing with A&D ointment twice daily (please avoid dry gauze)   - premarin cream qHS x 2 weeks   - continue with positional changes q2h    Dr. Rodriguez at bedside.

## 2021-04-20 NOTE — PROGRESS NOTE ADULT - ASSESSMENT
69 y/o F with a pmhx of CAD s/p CABG (Nov 2019), HFrEF (EF 20%), AS, ESRD (MWF), bladder prolapse requiring chronic Castellon, Diabetes Type 2 on insulin presents from home s/p mechanical fall. Trauma workup in ED significant for R humeral fracture / R hip fracture / non displaced odontoid fracture/ acute rib fractures. In the ED patient arrested, ROSC achieved after 2 minutes of CPR. EKG at the time showed RBBB with wide complex tachy. Pt subsequently intubated and sedated, started on pressors. Pt Experienced seizure-like episode during weaning trial s/p 2mg Versed.  (4/2/21) Weaning trial passed. Patient was Extubated . 02 sat began to decrease to low 80s, tachypenic and cyanotic started on bipap with no improvement. Patient again became hypotensive and tachy. Went into Afib. s/p cardioversion x3 and started on amio     #intubated  -ABG good but low NIF 20s  -ABG 4/20/21 7.42 PCO2 45, HCO3 28  -plan to tracheostomy; attending Dr. Raya spoke with healthcare proxy    # RT Humerus/Rt femur fracture/Rt Hip fracture   - Ortho following  - Pain control. Currently on Fentanyl drip   - S/p Intramedullary Rodding of fracture of Rt femur   - S/p 1 u PRBCs intraoperatively     #Cardiac arrest in the setting of arythmia and CHF exacerbation   - s/p C (4/9) - Patent LIMA and SVG grafts, Transaortic gradient ~ 10-15mmHg  - Trops .22-->.24 -->.21-->.34 (4/4/21)  - CTA negative for PE   - Lower extremity duplex negative for DVT( 4/6/21)  - s/p BIV ICD placement. EP interrogation no events   - Weaning trial. Plan for extubation today.     # Neck Ulceration  -burn recs were appreciated    #LUE DVT  - UE duplex : Thrombus noted in the left brachial vein.  - Started on Heparin infusion --> following PTT    # Seizure unknown etiology  - CT Head (4/1) negative   - Keppra 500mg adminstered. followed by 375mg BID with additional 250mg dose after dialysis  - Started on Depakote 250mg PO BID   - VEEG - bifrontal sharps but no seizure activity. Consistent with metabolic process.     # Aspiration Pneumonia vs Adenovirus Pneumonia   - CXR : Stable bilateral opacities. No pneumothorax. Support devices, in satisfactory position.  - s/p Completion of ceftriaxone     # Perirectal abscess  - surgery is following patient. Dr Mota   - s/p debridement over a month. No need for packing as per surgery     #Odontoid fracture   - Neurosurgery following patient   - Want to c/w collar      #ESRD  - Nephro following patient  - HD 4/15   -ESRD on HD TTS. going for HD today     #uterine prolapse ulcer  -gyn saw her  -recommended A and D ointment  treatment at area of ulcer  -currently does not need further intervention per gyn     # h/o DM   - fingersticks tid  - hold home insulin  - insulin protocol if > 180    # DVT PPX: heparin subq  # GI PPX: PPI  # Diet: NPO   # Activity: bedrest  # Bowel regimen

## 2021-04-20 NOTE — PROGRESS NOTE ADULT - ASSESSMENT
1. Odontoid fracture. Neurosurgery following.  2. R hip / R humerus fracture. Ortho following. S/P ORIF.  3. ESRD on HD TTS. HD today: 3 hours, opti 160 dialyzer, 3K bath, 2L UF.  4. Anemia. EPO with HD.  5. Secondary hyperparathyroidism. Hectorol with HD.

## 2021-04-20 NOTE — PROGRESS NOTE ADULT - ATTENDING COMMENTS
above noted discussed case with surgical resident neck collar in place needs clearance by neourosurgical clearance

## 2021-04-20 NOTE — PROGRESS NOTE ADULT - SUBJECTIVE AND OBJECTIVE BOX
[facility] FOLLOW UP NOTE  --------------------------------------------------------------------------------  24 hour events/subjective: Patient examined during HD. Intubated.    PAST HISTORY  --------------------------------------------------------------------------------  No significant changes to PMH, PSH, FHx, SHx, unless otherwise noted    ALLERGIES & MEDICATIONS  --------------------------------------------------------------------------------  Allergies    No Known Allergies    Intolerances      Standing Inpatient Medications  aMIOdarone    Tablet 200 milliGRAM(s) Oral daily  aspirin  chewable 81 milliGRAM(s) Oral daily  atorvastatin 40 milliGRAM(s) Oral at bedtime  BACItracin   Ointment 1 Application(s) Topical every 12 hours  chlorhexidine 0.12% Liquid 15 milliLiter(s) Oral Mucosa every 12 hours  collagenase Ointment 1 Application(s) Topical two times a day  dexMEDEtomidine Infusion 0.228 MICROgram(s)/kG/Hr IV Continuous <Continuous>  dextrose 40% Gel 15 Gram(s) Oral once  dextrose 5%. 1000 milliLiter(s) IV Continuous <Continuous>  dextrose 5%. 1000 milliLiter(s) IV Continuous <Continuous>  dextrose 50% Injectable 25 Gram(s) IV Push once  dextrose 50% Injectable 12.5 Gram(s) IV Push once  dextrose 50% Injectable 25 Gram(s) IV Push once  doxercalciferol Injectable 2 MICROGram(s) IV Push <User Schedule>  epoetin mana-epbx (RETACRIT) Injectable 08945 Unit(s) IV Push <User Schedule>  estrogens  Cream 1 Gram(s) Vaginal at bedtime  fentaNYL   Patch  25 MICROgram(s)/Hr 1 Patch Transdermal every 72 hours  glucagon  Injectable 1 milliGRAM(s) IntraMuscular once  heparin  Infusion 1300 Unit(s)/Hr IV Continuous <Continuous>  insulin lispro (ADMELOG) corrective regimen sliding scale   SubCutaneous Before meals and at bedtime  lactulose Syrup 15 Gram(s) Oral three times a day  levETIRAcetam  IVPB 375 milliGRAM(s) IV Intermittent every 12 hours  levETIRAcetam  IVPB 250 milliGRAM(s) IV Intermittent <User Schedule>  midodrine 10 milliGRAM(s) Oral every 8 hours  pantoprazole  Injectable 40 milliGRAM(s) IV Push daily  polyethylene glycol 3350 17 Gram(s) Oral at bedtime  senna 2 Tablet(s) Oral at bedtime  sertraline 50 milliGRAM(s) Oral daily  valproic  acid Syrup 250 milliGRAM(s) Oral two times a day    PRN Inpatient Medications  acetaminophen   Tablet .. 650 milliGRAM(s) Oral every 6 hours PRN  morphine  - Injectable 2 milliGRAM(s) IV Push every 4 hours PRN      VITALS/PHYSICAL EXAM  --------------------------------------------------------------------------------  T(C): 36.8 (04-20-21 @ 08:00), Max: 37.6 (04-20-21 @ 00:00)  HR: 74 (04-20-21 @ 09:35) (54 - 104)  BP: 144/57 (04-20-21 @ 06:12) (144/57 - 144/57)  RR: 18 (04-20-21 @ 09:00) (12 - 145)  SpO2: 100% (04-20-21 @ 09:35) (100% - 100%)  Wt(kg): --  Height (cm): 157.5 (04-20-21 @ 06:25)  Weight (kg): 58 (04-20-21 @ 06:24)  BMI (kg/m2): 23.4 (04-20-21 @ 06:25)  BSA (m2): 1.58 (04-20-21 @ 06:25)      04-19-21 @ 07:01  -  04-20-21 @ 07:00  --------------------------------------------------------  IN: 1498 mL / OUT: 55 mL / NET: 1443 mL    04-20-21 @ 07:01  -  04-20-21 @ 11:09  --------------------------------------------------------  IN: 10 mL / OUT: 0 mL / NET: 10 mL      Physical Exam:  	Gen: Intubated  	Pulm: CTA B/L  	CV: RRR, S1S2  	Abd: +BS, soft, nondistended  	: Ravinder  	LE: Warm,  edema  	Vascular access: TDC    LABS/STUDIES  --------------------------------------------------------------------------------              7.4    7.59  >-----------<  242      [04-20-21 @ 04:50]              24.1     140  |  104  |  82  ----------------------------<  213      [04-20-21 @ 04:50]  4.4   |  25  |  3.8        Ca     8.3     [04-20-21 @ 04:50]      Mg     2.4     [04-20-21 @ 04:50]      Phos  2.1     [04-20-21 @ 04:50]    TPro  4.7  /  Alb  2.3  /  TBili  0.4  /  DBili  x   /  AST  10  /  ALT  5   /  AlkPhos  133  [04-20-21 @ 04:50]    PT/INR: PT 14.70, INR 1.28       [04-20-21 @ 04:50]  PTT: 46.3       [04-20-21 @ 04:50]      Creatinine Trend:  SCr 3.8 [04-20 @ 04:50]  SCr 3.4 [04-19 @ 05:25]  SCr 2.9 [04-18 @ 04:30]  SCr 3.6 [04-17 @ 04:27]  SCr 3.0 [04-16 @ 04:30]    Urinalysis - [04-02-21 @ 10:44]      Color Yellow / Appearance Turbid / SG 1.027 / pH 7.0      Gluc Negative / Ketone Negative  / Bili Negative / Urobili <2 mg/dL       Blood Moderate / Protein 100 mg/dL / Leuk Est Large / Nitrite Negative      RBC 14 / WBC >720 / Hyaline 16 / Gran  / Sq Epi  / Non Sq Epi 3 / Bacteria Moderate      Iron 55, TIBC 167, %sat 33      [05-27-20 @ 13:22]  Ferritin 636      [05-27-20 @ 13:22]  HbA1c 5.7      [11-09-19 @ 01:20]  TSH 1.71      [04-06-21 @ 04:40]

## 2021-04-20 NOTE — ADVANCED PRACTICE NURSE CONSULT - RECOMMEDATIONS
1. Stage 3 coccyx pressure injuury- Cleanse wound bed w/ normal saline, gently pat dry.  Apply thin layer of Coloplast Triad hydrophilic ointment to absorb wound exudate and provide protective layer. Cover w/ dry gauze dressing, and foam Allevyn dressing. Apply Triad & change dressing q12h and prn for strike-through drainage or soiling. If top layer of Triad soiled, gently remove w/ perineal cleanser and re-apply ointment. Do not scrub off as this may cause further skin breakdown. Do not apply foam Allevyn dressing directly over Triad (use gauze in between) as ointment gets absorbed into dressing as opposed to being in direct contact w/ wound bed.  -Assess skin/wound qshift, report changes to primary provider.     Additional recs:  Continue turning/positioning patient from side-to-side q2h while in bed, or in accordance w/ pt's plan of care. Continue utilizing pillows and/or z-stevo fluidized positioner to assist w/ turning/positioning.   -Continue to offload heels from bed surface with offloading boots to BLEs.   -Continue applying Coloplast Jorge Protect moisture barrier cream to buttock and perineal area daily and prn after each incontinent episode.    -Continue utilizing one underpad underneath patient to contain incontinence episodes; change pad when saturated/soiled.   -Continue nutrition consult & tight glucose control for optimal wound healing & nutritional status.     Plan of Care: Primary RN Kourtney made aware of above recs. Spoke w/ covering/primary CCU MD  in regards to above. No further needs/recs from [facility] service at this time. Staff RN to perform routine skin/wound assessment and manage wound care. Questions or concerns or if wound worsens and reconsult needed, please contact [facility], [phone].

## 2021-04-20 NOTE — PROGRESS NOTE ADULT - ASSESSMENT
1. Cardiac Arrest: secondary to VFIB in nature, continue supportive care, monitor for now. Monitor for now.    2. Acute Hypoxic Respiratory Failure: secondary to septic/ cardiogenic shock, PNA, continue on lung protective mechanical ventilation for now, will wean FIO2 for sats greater then 92%,  tx nebs, pulmonary toilet, continue supportive care monitor for now. Discussed with family will need tracheostomy, f/u surgery regarding timing    3. Mixed Cardiogenic/Septic Shock: secondary to PNA, will continue empiric abx as per ID, off Neosynephrine gtt will, continue monitor for now.    4. Acute Biventricular CHF EF 38% in setting of Severe Aortic stenosis and Mod-Severe MR: continue supportive care s/p BiVICD by EP, monitor for now.    5. PNA: s/p course of abx as per ID, continue pulmonary toilet tx nebs, monitor for now,    6. VFIB: tx amio daily, most likley ischemic in nature. F/u Cardiology recs.    7. B/l Rib Fractures: from fall continue pain control.    8. Right Humeral Fracture: continue supportive care as per Ortho, monitor for now.    9. Right Intertrochanteric Fracture: continue supportive care as per Ortho, s/p Intramedullary rodding of fracture of right femur   monitor for now.     10. Odontoid Fracture: continue nec collar as per Neurology f/u Recs.    11. Seizures: tx AED's as per Neurology monitor for now.    12. Anemia: secondary to hip fracture and AOC transfuse as needed for less then 7, continue supportive care, monitor for now.    13. Sacral Decubitus/ Abscess s/p I/D, f/u surgery recs.    14. CAD s/p CABG: tx ASA, continue supportive care, monitor for now.     15. Bladder Prolapse with Chronic Castellon: continue Castellon monitor for now.    16. DM: tx insulin sliding scale, monitor BSG's, continue supportive care.    17. ESRD on HD: continue renal replacement therapy as per nephrology, monitor for now.    18. DVT/ GI px: tx PPI/ hep gtt.     19. NUT: tx TF's.    20. Afib: tx amio oral, continue supportive care, monitor for now.    21. Left upper ext DVT: tx hep gtt continue supportive care, monitor for now.    Critical Care Time not including procedures 32 mins    Discussed case with  family

## 2021-04-21 NOTE — CHART NOTE - NSCHARTNOTEFT_GEN_A_CORE
Called for clearance for Tracheostomy. Ok to proceed with Tracheostomy. Avoid any extreme neck movements or hyper-extension. Pt needs neuro exam post op to check for movement in all extremities.

## 2021-04-21 NOTE — PROGRESS NOTE ADULT - ASSESSMENT
1. Odontoid fracture. Neurosurgery following.  2. R hip / R humerus fracture. Ortho following. S/P ORIF.  3. ESRD on HD TTS. HD tomorrow: 3 hours, opti 160 dialyzer, 2K bath, 2L UF.  4. Anemia. EPO with HD.  5. Secondary hyperparathyroidism. Hectorol with HD.

## 2021-04-21 NOTE — PROGRESS NOTE ADULT - SUBJECTIVE AND OBJECTIVE BOX
GUS WILBURN  70y Female   [id number]    Procedure/dx:  needs trach and PEG    Patient is a 70y old  Female who presents with a chief complaint of fall/syncope with multiple fractures (18 Apr 2021 15:20)    PAST MEDICAL & SURGICAL HISTORY:  Diabetes    Congestive heart failure (CHF)    Pleural effusion due to congestive heart failure    End stage renal disease  on dialysis Mon, Wed, Fr    Uterine prolapse    Chronic indwelling Castellon catheter    History of repair of hip fracture  2019    S/P CABG (coronary artery bypass graft)  11/2019    History of thoracentesis    Chronic indwelling Castellon catheter        Vital Signs Last 24 Hrs  T(C): 37.1 (21 Apr 2021 04:00), Max: 37.1 (20 Apr 2021 20:00)  T(F): 98.8 (21 Apr 2021 04:00), Max: 98.8 (20 Apr 2021 20:00)  HR: 82 (21 Apr 2021 06:00) (70 - 86)  BP: --  BP(mean): --  RR: 17 (21 Apr 2021 06:00) (15 - 72)  SpO2: 100% (21 Apr 2021 06:00) (99% - 100%)    Mode: AC/ CMV (Assist Control/ Continuous Mandatory Ventilation), RR (machine): 12, TV (machine): 350, FiO2: 30, PEEP: 5, ITime: 1, MAP: 6, PIP: 13    Diet, NPO after Midnight:      NPO Start Date: 20-Apr-2021,   NPO Start Time: 23:59 (04-20-21 @ 19:27)  Diet, NPO with Tube Feed:   Tube Feeding Modality: Orogastric  Peptamen A.F. Formula  Total Volume for 24 Hours (mL): 1080  Continuous  Until Goal Tube Feed Rate (mL per Hour): 45  Tube Feed Duration (in Hours): 24  Tube Feed Start Time: 19:00  Chris(7 Gm Arginine/7 Gm Glut/1.2 Gm HMB     Qty per Day:  2 (04-20-21 @ 09:03)      I&O's Detail    20 Apr 2021 07:01  -  21 Apr 2021 07:00  --------------------------------------------------------  IN:    Dexmedetomidine: 220 mL    Enteral Tube Flush: 200 mL    Heparin: 195 mL    Peptamen A.F.: 145 mL    Phenylephrine: 3 mL  Total IN: 763 mL    OUT:    Indwelling Catheter - Urethral (mL): 30 mL    Other (mL): 2000 mL  Total OUT: 2030 mL    Total NET: -1267 mL      MEDICATIONS  (STANDING):  aMIOdarone    Tablet 200 milliGRAM(s) Oral daily  aspirin  chewable 81 milliGRAM(s) Oral daily  atorvastatin 40 milliGRAM(s) Oral at bedtime  BACItracin   Ointment 1 Application(s) Topical every 12 hours  chlorhexidine 0.12% Liquid 15 milliLiter(s) Oral Mucosa every 12 hours  collagenase Ointment 1 Application(s) Topical two times a day  dexMEDEtomidine Infusion 0.228 MICROgram(s)/kG/Hr (3.3 mL/Hr) IV Continuous <Continuous>  dextrose 40% Gel 15 Gram(s) Oral once  dextrose 5%. 1000 milliLiter(s) (50 mL/Hr) IV Continuous <Continuous>  dextrose 5%. 1000 milliLiter(s) (100 mL/Hr) IV Continuous <Continuous>  dextrose 50% Injectable 25 Gram(s) IV Push once  dextrose 50% Injectable 12.5 Gram(s) IV Push once  dextrose 50% Injectable 25 Gram(s) IV Push once  doxercalciferol Injectable 2 MICROGram(s) IV Push <User Schedule>  epoetin mana-epbx (RETACRIT) Injectable 14381 Unit(s) IV Push <User Schedule>  estrogens  Cream 1 Gram(s) Vaginal at bedtime  fentaNYL   Patch  25 MICROgram(s)/Hr 1 Patch Transdermal every 72 hours  glucagon  Injectable 1 milliGRAM(s) IntraMuscular once  heparin  Infusion 1300 Unit(s)/Hr (13 mL/Hr) IV Continuous <Continuous>  insulin lispro (ADMELOG) corrective regimen sliding scale   SubCutaneous Before meals and at bedtime  lactulose Syrup 15 Gram(s) Oral three times a day  levETIRAcetam  IVPB 375 milliGRAM(s) IV Intermittent every 12 hours  levETIRAcetam  IVPB 250 milliGRAM(s) IV Intermittent <User Schedule>  midodrine 10 milliGRAM(s) Oral every 8 hours  pantoprazole  Injectable 40 milliGRAM(s) IV Push daily  polyethylene glycol 3350 17 Gram(s) Oral at bedtime  senna 2 Tablet(s) Oral at bedtime  sertraline 50 milliGRAM(s) Oral daily  valproic  acid Syrup 250 milliGRAM(s) Oral two times a day    MEDICATIONS  (PRN):  acetaminophen   Tablet .. 650 milliGRAM(s) Oral every 6 hours PRN Temp greater or equal to 38C (100.4F)  morphine  - Injectable 2 milliGRAM(s) IV Push every 4 hours PRN Severe Pain (7 - 10)    PHYSICAL EXAM:  GENERAL: intubated  Supple. On C-colar.   CHEST/LUNG: Clear to auscultation bilaterally;   HEART: S1 and S2 noted  ABDOMEN: Soft, Nondistended;     LABS:                         7.8    10.11 )-----------( 268      ( 21 Apr 2021 04:40 )             25.4        04-21    139  |  100  |  49<H>  ----------------------------<  170<H>  4.0   |  27  |  2.8<H>    Ca    8.0<L>      21 Apr 2021 04:40  Phos  2.1     04-21  Mg     2.1     04-21    TPro  5.0<L>  /  Alb  2.5<L>  /  TBili  0.4  /  DBili  x   /  AST  9   /  ALT  <5  /  AlkPhos  133<H>  04-21    LIVER FUNCTIONS - ( 21 Apr 2021 04:40 )  Alb: 2.5 g/dL / Pro: 5.0 g/dL / ALK PHOS: 133 U/L / ALT: <5 U/L / AST: 9 U/L / GGT: x           PT/INR - ( 20 Apr 2021 04:50 )   PT: 14.70 sec;   INR: 1.28 ratio         PTT - ( 21 Apr 2021 04:40 )  PTT:26.2 sec      IMAGING:  < from: Xray Chest 1 View- PORTABLE-Routine (Xray Chest 1 View- PORTABLE-Routine in AM.) (04.20.21 @ 05:58) >  Impression:    Stable bibasilar opacities.  Stable support devices.    < end of copied text >

## 2021-04-21 NOTE — PROGRESS NOTE ADULT - ASSESSMENT
69 y/o female needs trach and PEG    Plan:   OR today for trach  Keep NPO, IVF  Need neurosurgery, medical and cardiology clearance for OR

## 2021-04-21 NOTE — CHART NOTE - NSCHARTNOTEFT_GEN_A_CORE
Surgery post-op Note 04-21-21 @ 17:40    Procedure: s/p tracheostomy    S: 69 y/o female tracheostomy. Patient is laying comfortably in the bed.     O:   T(C): 36.8 (04-21-21 @ 16:00), Max: 37.1 (04-20-21 @ 20:00)  HR: 86 (04-21-21 @ 17:00) (68 - 86)  BP: 126/90 (04-21-21 @ 10:54) (126/90 - 126/90)  RR: 22 (04-21-21 @ 17:00) (14 - 72)  SpO2: 100% (04-21-21 @ 16:00) (99% - 100%)    Physical Exam:  General: NAD. alert  Heart: S1 and S2 noted  Lungs: Clear to auscultation bilaterally  Abdomen: Soft Non tender, Non distended.   Extremities: Normal in color and temperatue.   Neck: No bleeding or discharge noted from the incision site in the neck. no hematoma noted      04-20-21 @ 07:01  -  04-21-21 @ 07:00  --------------------------------------------------------  IN: 783 mL / OUT: 2030 mL / NET: -1247 mL    04-21-21 @ 07:01  -  04-21-21 @ 17:40  --------------------------------------------------------  IN: 300 mL / OUT: 25 mL / NET: 275 mL    acetaminophen   Tablet .. 650 milliGRAM(s) Oral every 6 hours PRN  aMIOdarone    Tablet 200 milliGRAM(s) Oral daily  aspirin  chewable 81 milliGRAM(s) Oral daily  atorvastatin 40 milliGRAM(s) Oral at bedtime  BACItracin   Ointment 1 Application(s) Topical every 12 hours  chlorhexidine 0.12% Liquid 15 milliLiter(s) Oral Mucosa every 12 hours  collagenase Ointment 1 Application(s) Topical two times a day  dextrose 40% Gel 15 Gram(s) Oral once  dextrose 5%. 1000 milliLiter(s) IV Continuous <Continuous>  dextrose 5%. 1000 milliLiter(s) IV Continuous <Continuous>  dextrose 50% Injectable 25 Gram(s) IV Push once  dextrose 50% Injectable 12.5 Gram(s) IV Push once  dextrose 50% Injectable 25 Gram(s) IV Push once  doxercalciferol Injectable 2 MICROGram(s) IV Push <User Schedule>  epoetin mana-epbx (RETACRIT) Injectable 25453 Unit(s) IV Push <User Schedule>  estrogens  Cream 1 Gram(s) Vaginal at bedtime  fentaNYL   Patch  25 MICROgram(s)/Hr 1 Patch Transdermal every 72 hours  glucagon  Injectable 1 milliGRAM(s) IntraMuscular once  HYDROmorphone  Injectable 0.5 milliGRAM(s) IV Push every 4 hours PRN  insulin lispro (ADMELOG) corrective regimen sliding scale   SubCutaneous Before meals and at bedtime  lactulose Syrup 15 Gram(s) Oral three times a day  levETIRAcetam  IVPB 375 milliGRAM(s) IV Intermittent every 12 hours  levETIRAcetam  IVPB 250 milliGRAM(s) IV Intermittent <User Schedule>  midodrine 10 milliGRAM(s) Oral every 8 hours  morphine  - Injectable 2 milliGRAM(s) IV Push every 4 hours PRN  morphine  - Injectable 2 milliGRAM(s) IV Push every 4 hours PRN  pantoprazole  Injectable 40 milliGRAM(s) IV Push daily  polyethylene glycol 3350 17 Gram(s) Oral at bedtime  senna 2 Tablet(s) Oral at bedtime  sertraline 50 milliGRAM(s) Oral daily  valproic  acid Syrup 250 milliGRAM(s) Oral two times a day    Assessment:  69 y/o female tracheostomy.     Plan:  Pain control  DVT and GI prophylaxis  recall as needed

## 2021-04-21 NOTE — PROGRESS NOTE ADULT - SUBJECTIVE AND OBJECTIVE BOX
ORTHO PROGRESS NOTE    Interval History:  PO64 s/p R femur IMN   Patient seen and examined at bedside.  Following basic commands  Distal aquacel dressing saturated, changed during morning rounds  Prevena functioning.    Vital Signs Last 24 Hrs  T(C): 37.6 (18 Apr 2021 04:00), Max: 37.7 (17 Apr 2021 20:00)  T(F): 99.6 (18 Apr 2021 04:00), Max: 99.9 (17 Apr 2021 20:00)  HR: 84 (18 Apr 2021 06:00) (82 - 106)  RR: 12 (18 Apr 2021 06:00) (12 - 23)  SpO2: 100% (18 Apr 2021 06:00) (97% - 100%)    Physical Exam:   Dressing C/D/I  Prevena functioning  Distal aquacell dressing replaced   Following basic commands, able to wiggle toes and sensation grossly intact in LEs  CR<2sec, palpable pulses                   8.4    11.62 )-----------( 303      ( 18 Apr 2021 04:30 )             26.7     142  |  102  |  39<H>  ----------------------------<  238<H>  3.8   |  25  |  2.9<H>    Ca    8.7      18 Apr 2021 04:30  Phos  3.2     04-18  Mg     1.9     04-18  TPro  5.1<L>  /  Alb  2.5<L>  /  TBili  0.3  /  DBili  x   /  AST  14  /  ALT  <5  /  AlkPhos  143<H>  04-18  PT/INR - ( 18 Apr 2021 04:30 )   PT: 29.20 sec;   INR: 2.54 ratio    PTT - ( 18 Apr 2021 04:30 )  PTT:32.1 sec    A/P: 70F POD #6 right hip ORIF.    WBAT on RLE  NWB RUE (proximal humerus fracture)  DVT ppx: SCD, ASA ordered.  Pain control  Home medications: resume  Continue trend labs: Transfuse PRN  Rest of management per primary  Dispo: Pending PT recommendations

## 2021-04-21 NOTE — CHART NOTE - NSCHARTNOTEFT_GEN_A_CORE
Patient underwent Tracheostomy in OR today with Dr Mota.  Saw pt in CCU post op.  Pt has hard C-collar in place.  Pt is awake, able to follow commands.  Pt GEORGE x 4.  No motor neuro deficits.  Repeat CT C-spine in 2-3 weeks for follow up of known C2 fracture.

## 2021-04-21 NOTE — PROGRESS NOTE ADULT - SUBJECTIVE AND OBJECTIVE BOX
Patient is a 70y old  Female who presents with a chief complaint of Resp failure (21 Apr 2021 10:26)        Pt remains critically ill in ICU on MV        ROS:     All ROS are negative except HPI         PHYSICAL EXAM    ICU Vital Signs Last 24 Hrs  T(C): 36.7 (21 Apr 2021 12:00), Max: 37.1 (20 Apr 2021 20:00)  T(F): 98 (21 Apr 2021 12:00), Max: 98.8 (20 Apr 2021 20:00)  HR: 76 (21 Apr 2021 12:00) (70 - 86)  BP: 126/90 (21 Apr 2021 10:54) (126/90 - 126/90)  BP(mean): --  ABP: 104/78 (21 Apr 2021 12:00) (104/78 - 148/58)  ABP(mean): 86 (21 Apr 2021 12:00) (70 - 104)  RR: 26 (21 Apr 2021 12:00) (14 - 72)  SpO2: 100% (21 Apr 2021 12:00) (99% - 100%)      CONSTITUTIONAL:  Pt remains critically ill in ICU on MV    ENT:   pos ET tube    EYES:   Pupils equal,   Round and reactive to light.    CARDIAC:   Normal rate,   Regular rhythm.    No edema      Vascular:  Normal systolic impulse  No Carotid bruits    RESPIRATORY:   No wheezing  Bilateral BS  Normal chest expansion  Not tachypneic,  No use of accessory muscles    GASTROINTESTINAL:  Abdomen soft,   Non-tender,   No guarding,   + BS    MUSCULOSKELETAL:   Range of motion is not limited,  No clubbing, cyanosis    NEUROLOGICAL:   Alert and oriented   No motor  deficits.    SKIN:   Skin normal color for race,   Warm and dry and intact.   No evidence of rash.    PSYCHIATRIC:   Normal mood and affect.   No apparent risk to self or others.    HEMATOLOGICAL:  No cervical  lymphadenopathy.  no inguinal lymphadenopathy      04-20-21 @ 07:01  -  04-21-21 @ 07:00  --------------------------------------------------------  IN:    Dexmedetomidine: 240 mL    Enteral Tube Flush: 200 mL    Heparin: 195 mL    Peptamen A.F.: 145 mL    Phenylephrine: 3 mL  Total IN: 783 mL    OUT:    Indwelling Catheter - Urethral (mL): 30 mL    Other (mL): 2000 mL  Total OUT: 2030 mL    Total NET: -1247 mL      04-21-21 @ 07:01  -  04-21-21 @ 13:22  --------------------------------------------------------  IN:    Dexmedetomidine: 50 mL    Enteral Tube Flush: 40 mL    IV PiggyBack: 100 mL  Total IN: 190 mL    OUT:    Heparin: 0 mL    Indwelling Catheter - Urethral (mL): 10 mL  Total OUT: 10 mL    Total NET: 180 mL          LABS:                            7.8    10.11 )-----------( 268      ( 21 Apr 2021 04:40 )             25.4                                               04-21    139  |  100  |  49<H>  ----------------------------<  170<H>  4.0   |  27  |  2.8<H>    Ca    8.0<L>      21 Apr 2021 04:40  Phos  2.1     04-21  Mg     2.1     04-21    TPro  5.0<L>  /  Alb  2.5<L>  /  TBili  0.4  /  DBili  x   /  AST  9   /  ALT  <5  /  AlkPhos  133<H>  04-21      PT/INR - ( 20 Apr 2021 04:50 )   PT: 14.70 sec;   INR: 1.28 ratio         PTT - ( 21 Apr 2021 04:40 )  PTT:26.2 sec                                                                                     LIVER FUNCTIONS - ( 21 Apr 2021 04:40 )  Alb: 2.5 g/dL / Pro: 5.0 g/dL / ALK PHOS: 133 U/L / ALT: <5 U/L / AST: 9 U/L / GGT: x                                                                                               Mode: AC/ CMV (Assist Control/ Continuous Mandatory Ventilation)  RR (machine): 12  TV (machine): 350  FiO2: 30  PEEP: 5  ITime: 1  MAP: 8  PIP: 22                                      ABG - ( 21 Apr 2021 03:52 )  pH, Arterial: 7.46  pH, Blood: x     /  pCO2: 43    /  pO2: 128   / HCO3: 30    / Base Excess: 5.8   /  SaO2: 100                 MEDICATIONS  (STANDING):  aMIOdarone    Tablet 200 milliGRAM(s) Oral daily  aspirin  chewable 81 milliGRAM(s) Oral daily  atorvastatin 40 milliGRAM(s) Oral at bedtime  BACItracin   Ointment 1 Application(s) Topical every 12 hours  chlorhexidine 0.12% Liquid 15 milliLiter(s) Oral Mucosa every 12 hours  collagenase Ointment 1 Application(s) Topical two times a day  dexMEDEtomidine Infusion 0.228 MICROgram(s)/kG/Hr (3.3 mL/Hr) IV Continuous <Continuous>  dextrose 40% Gel 15 Gram(s) Oral once  dextrose 5%. 1000 milliLiter(s) (50 mL/Hr) IV Continuous <Continuous>  dextrose 5%. 1000 milliLiter(s) (100 mL/Hr) IV Continuous <Continuous>  dextrose 50% Injectable 25 Gram(s) IV Push once  dextrose 50% Injectable 12.5 Gram(s) IV Push once  dextrose 50% Injectable 25 Gram(s) IV Push once  doxercalciferol Injectable 2 MICROGram(s) IV Push <User Schedule>  epoetin mana-epbx (RETACRIT) Injectable 08261 Unit(s) IV Push <User Schedule>  estrogens  Cream 1 Gram(s) Vaginal at bedtime  fentaNYL   Patch  25 MICROgram(s)/Hr 1 Patch Transdermal every 72 hours  glucagon  Injectable 1 milliGRAM(s) IntraMuscular once  insulin lispro (ADMELOG) corrective regimen sliding scale   SubCutaneous Before meals and at bedtime  lactulose Syrup 15 Gram(s) Oral three times a day  levETIRAcetam  IVPB 375 milliGRAM(s) IV Intermittent every 12 hours  levETIRAcetam  IVPB 250 milliGRAM(s) IV Intermittent <User Schedule>  midodrine 10 milliGRAM(s) Oral every 8 hours  pantoprazole  Injectable 40 milliGRAM(s) IV Push daily  polyethylene glycol 3350 17 Gram(s) Oral at bedtime  senna 2 Tablet(s) Oral at bedtime  sertraline 50 milliGRAM(s) Oral daily  valproic  acid Syrup 250 milliGRAM(s) Oral two times a day    MEDICATIONS  (PRN):  acetaminophen   Tablet .. 650 milliGRAM(s) Oral every 6 hours PRN Temp greater or equal to 38C (100.4F)  HYDROmorphone  Injectable 0.5 milliGRAM(s) IV Push every 4 hours PRN Severe Pain (7 - 10)      New X-rays reviewed:                                                                                  ECHO    CXR interpreted by me:

## 2021-04-21 NOTE — PRE-OP CHECKLIST - PATIENT PROBLEMS/NEEDS
Patient expressed no known problems or needs
none/Patient expressed no known problems or needs
Patient expressed no known problems or needs

## 2021-04-21 NOTE — PROGRESS NOTE ADULT - ASSESSMENT
1. Cardiac Arrest: secondary to VFIB in nature, continue supportive care, monitor for now. Monitor for now.    2. Acute Hypoxic Respiratory Failure: secondary to septic/ cardiogenic shock, PNA, continue on lung protective mechanical ventilation for now, will wean FIO2 for sats greater then 92%,  tx nebs, pulmonary toilet, continue supportive care monitor for now. awaiting tracheostomy, f/u surgery regarding timing    3. Mixed Cardiogenic/Septic Shock: secondary to PNA, will continue empiric abx as per ID, off Neosynephrine gtt will, continue monitor for now.    4. Acute Biventricular CHF EF 38% in setting of Severe Aortic stenosis and Mod-Severe MR: continue supportive care s/p BiVICD by EP, monitor for now.    5. PNA: s/p course of abx as per ID, continue pulmonary toilet tx nebs, monitor for now,    6. VFIB: tx amio daily, most likley ischemic in nature. F/u Cardiology recs.    7. B/l Rib Fractures: from fall continue pain control.    8. Right Humeral Fracture: continue supportive care as per Ortho, monitor for now.    9. Right Intertrochanteric Fracture: continue supportive care as per Ortho, s/p Intramedullary rodding of fracture of right femur   monitor for now.     10. Odontoid Fracture: continue nec collar as per Neurology f/u Recs.    11. Seizures: tx AED's as per Neurology monitor for now.    12. Anemia: secondary to hip fracture and AOC transfuse as needed for less then 7, continue supportive care, monitor for now.    13. Sacral Decubitus/ Abscess s/p I/D, f/u surgery recs.    14. CAD s/p CABG: tx ASA, continue supportive care, monitor for now.     15. Bladder Prolapse with Chronic Castellon: continue Castellon monitor for now.    16. DM: tx insulin sliding scale, monitor BSG's, continue supportive care.    17. ESRD on HD: continue renal replacement therapy as per nephrology, monitor for now.    18. DVT/ GI px: tx PPI/ hep gtt.     19. NUT: tx TF's.    20. Afib: tx amio oral, continue supportive care, monitor for now.    21. Left upper ext DVT: tx hep gtt continue supportive care, monitor for now.    Critical Care Time not including procedures 31 mins    Discussed case with  family

## 2021-04-21 NOTE — BRIEF OPERATIVE NOTE - NSICDXBRIEFPOSTOP_GEN_ALL_CORE_FT
POST-OP DIAGNOSIS:  Hip fracture, right 15-Apr-2021 22:20:40  Eduardo Gilliam  
POST-OP DIAGNOSIS:  Personal history of sudden cardiac death (SCD) resuscitated 12-Apr-2021 16:39:29  Alexander Sam  Cardiomyopathy 12-Apr-2021 16:39:10  Alexander Sam  Afib 12-Apr-2021 16:39:05  Alexander Sam  
POST-OP DIAGNOSIS:  Respiratory failure 21-Apr-2021 14:04:28  Kate Collins

## 2021-04-21 NOTE — PROGRESS NOTE ADULT - SUBJECTIVE AND OBJECTIVE BOX
SUBJ: Patient still intubated going for trach and PEG      MEDICATIONS  (STANDING):  aMIOdarone    Tablet 200 milliGRAM(s) Oral daily  aspirin  chewable 81 milliGRAM(s) Oral daily  atorvastatin 40 milliGRAM(s) Oral at bedtime  BACItracin   Ointment 1 Application(s) Topical every 12 hours  chlorhexidine 0.12% Liquid 15 milliLiter(s) Oral Mucosa every 12 hours  collagenase Ointment 1 Application(s) Topical two times a day  dexMEDEtomidine Infusion 0.228 MICROgram(s)/kG/Hr (3.3 mL/Hr) IV Continuous <Continuous>  dextrose 40% Gel 15 Gram(s) Oral once  dextrose 5%. 1000 milliLiter(s) (50 mL/Hr) IV Continuous <Continuous>  dextrose 5%. 1000 milliLiter(s) (100 mL/Hr) IV Continuous <Continuous>  dextrose 50% Injectable 25 Gram(s) IV Push once  dextrose 50% Injectable 12.5 Gram(s) IV Push once  dextrose 50% Injectable 25 Gram(s) IV Push once  doxercalciferol Injectable 2 MICROGram(s) IV Push <User Schedule>  epoetin mana-epbx (RETACRIT) Injectable 23938 Unit(s) IV Push <User Schedule>  estrogens  Cream 1 Gram(s) Vaginal at bedtime  fentaNYL   Patch  25 MICROgram(s)/Hr 1 Patch Transdermal every 72 hours  glucagon  Injectable 1 milliGRAM(s) IntraMuscular once  insulin lispro (ADMELOG) corrective regimen sliding scale   SubCutaneous Before meals and at bedtime  lactulose Syrup 15 Gram(s) Oral three times a day  levETIRAcetam  IVPB 375 milliGRAM(s) IV Intermittent every 12 hours  levETIRAcetam  IVPB 250 milliGRAM(s) IV Intermittent <User Schedule>  midodrine 10 milliGRAM(s) Oral every 8 hours  pantoprazole  Injectable 40 milliGRAM(s) IV Push daily  polyethylene glycol 3350 17 Gram(s) Oral at bedtime  senna 2 Tablet(s) Oral at bedtime  sertraline 50 milliGRAM(s) Oral daily  valproic  acid Syrup 250 milliGRAM(s) Oral two times a day    MEDICATIONS  (PRN):  acetaminophen   Tablet .. 650 milliGRAM(s) Oral every 6 hours PRN Temp greater or equal to 38C (100.4F)  HYDROmorphone  Injectable 0.5 milliGRAM(s) IV Push every 4 hours PRN Severe Pain (7 - 10)            Vital Signs Last 24 Hrs  T(C): 36.8 (21 Apr 2021 08:00), Max: 37.1 (20 Apr 2021 20:00)  T(F): 98.2 (21 Apr 2021 08:00), Max: 98.8 (20 Apr 2021 20:00)  HR: 70 (21 Apr 2021 10:00) (70 - 86)  BP: --  BP(mean): --  RR: 19 (21 Apr 2021 10:00) (14 - 72)  SpO2: 100% (21 Apr 2021 10:00) (99% - 100%)     REVIEW OF SYSTEMS:  CONSTITUTIONAL: No fever, weight loss, or fatigue  CARDIOLOGY: PAtient denies chest pain, shortness of breath or syncopal episodes.   RESPIRATORY: denies shortness of breath, wheezeing.   NEUROLOGICAL: NO weakness, no focal deficits to report.  ENDOCRINOLOGICAL: no recent change in diabetic medications.   GI: no BRBPR, no N,V,diarrhea.    PSYCHIATRY: normal mood and affect  HEENT: no nasal discharge, no ecchymosis  SKIN: no ecchymosis, no breakdown  MUSCULOSKELETAL: Full range of motion x4.        PHYSICAL EXAM:  · CONSTITUTIONAL:	Well-developed, well nourished    BMI-  ·RESPIRATORY:   airway patent; breath sounds equal; good air movement; respirations non-labored; clear to auscultation bilaterally; no chest wall tenderness; no intercostal retractions; no rales,rhonchi or wheeze  · CARDIOVASCULAR	regular rate and rhythm  no rub  no murmur  normal PMI  · EXTREMITIES: No cyanosis, clubbing or edema  · VASCULAR: 	Equal and normal pulses (carotid, femoral, dorsalis pedis)  	  TELEMETRY:    ECG:    TTE:    LABS:                        7.8    10.11 )-----------( 268      ( 21 Apr 2021 04:40 )             25.4     04-21    139  |  100  |  49<H>  ----------------------------<  170<H>  4.0   |  27  |  2.8<H>    Ca    8.0<L>      21 Apr 2021 04:40  Phos  2.1     04-21  Mg     2.1     04-21    TPro  5.0<L>  /  Alb  2.5<L>  /  TBili  0.4  /  DBili  x   /  AST  9   /  ALT  <5  /  AlkPhos  133<H>  04-21        PT/INR - ( 20 Apr 2021 04:50 )   PT: 14.70 sec;   INR: 1.28 ratio         PTT - ( 21 Apr 2021 04:40 )  PTT:26.2 sec    I&O's Summary    20 Apr 2021 07:01  -  21 Apr 2021 07:00  --------------------------------------------------------  IN: 783 mL / OUT: 2030 mL / NET: -1247 mL    21 Apr 2021 07:01  -  21 Apr 2021 10:27  --------------------------------------------------------  IN: 30 mL / OUT: 10 mL / NET: 20 mL      BNP  RADIOLOGY & ADDITIONAL STUDIES:    IMPRESSION AND PLAN:    Post cardiac cath and patent graft  EF 40% with moderate AS  patient is cleared as low to moderate risk for Trach and PEG  discussed with team in rounds

## 2021-04-21 NOTE — BRIEF OPERATIVE NOTE - NSICDXBRIEFPREOP_GEN_ALL_CORE_FT
PRE-OP DIAGNOSIS:  Respiratory failure 21-Apr-2021 14:04:00  Kate Collins  
PRE-OP DIAGNOSIS:  Afib 12-Apr-2021 16:38:56  Alexander Sam  Cardiomyopathy 12-Apr-2021 16:38:40  Alexander Sam  Personal history of sudden cardiac death (SCD) resuscitated 12-Apr-2021 16:38:21  Alexander Sam  
PRE-OP DIAGNOSIS:  Hip fracture, right 15-Apr-2021 22:20:27  Eduardo Gilliam

## 2021-04-21 NOTE — PROGRESS NOTE ADULT - SUBJECTIVE AND OBJECTIVE BOX
[place] NEPHROLOGY FOLLOW UP NOTE  --------------------------------------------------------------------------------  24 hour events/subjective: Patient examined. Intubated.    PAST HISTORY  --------------------------------------------------------------------------------  No significant changes to PMH, PSH, FHx, SHx, unless otherwise noted    ALLERGIES & MEDICATIONS  --------------------------------------------------------------------------------  Allergies    No Known Allergies    Standing Inpatient Medications  aMIOdarone    Tablet 200 milliGRAM(s) Oral daily  aspirin  chewable 81 milliGRAM(s) Oral daily  atorvastatin 40 milliGRAM(s) Oral at bedtime  BACItracin   Ointment 1 Application(s) Topical every 12 hours  chlorhexidine 0.12% Liquid 15 milliLiter(s) Oral Mucosa every 12 hours  collagenase Ointment 1 Application(s) Topical two times a day  dexMEDEtomidine Infusion 0.228 MICROgram(s)/kG/Hr IV Continuous <Continuous>  dextrose 40% Gel 15 Gram(s) Oral once  dextrose 5%. 1000 milliLiter(s) IV Continuous <Continuous>  dextrose 5%. 1000 milliLiter(s) IV Continuous <Continuous>  dextrose 50% Injectable 25 Gram(s) IV Push once  dextrose 50% Injectable 12.5 Gram(s) IV Push once  dextrose 50% Injectable 25 Gram(s) IV Push once  doxercalciferol Injectable 2 MICROGram(s) IV Push <User Schedule>  epoetin mana-epbx (RETACRIT) Injectable 36180 Unit(s) IV Push <User Schedule>  estrogens  Cream 1 Gram(s) Vaginal at bedtime  fentaNYL   Patch  25 MICROgram(s)/Hr 1 Patch Transdermal every 72 hours  glucagon  Injectable 1 milliGRAM(s) IntraMuscular once  insulin lispro (ADMELOG) corrective regimen sliding scale   SubCutaneous Before meals and at bedtime  lactulose Syrup 15 Gram(s) Oral three times a day  levETIRAcetam  IVPB 375 milliGRAM(s) IV Intermittent every 12 hours  levETIRAcetam  IVPB 250 milliGRAM(s) IV Intermittent <User Schedule>  midodrine 10 milliGRAM(s) Oral every 8 hours  pantoprazole  Injectable 40 milliGRAM(s) IV Push daily  polyethylene glycol 3350 17 Gram(s) Oral at bedtime  senna 2 Tablet(s) Oral at bedtime  sertraline 50 milliGRAM(s) Oral daily  valproic  acid Syrup 250 milliGRAM(s) Oral two times a day    PRN Inpatient Medications  acetaminophen   Tablet .. 650 milliGRAM(s) Oral every 6 hours PRN  HYDROmorphone  Injectable 0.5 milliGRAM(s) IV Push every 4 hours PRN      VITALS/PHYSICAL EXAM  --------------------------------------------------------------------------------  T(C): 36 (04-21-21 @ 10:54), Max: 37.1 (04-20-21 @ 20:00)  HR: 75 (04-21-21 @ 10:54) (70 - 86)  BP: 126/90 (04-21-21 @ 10:54) (126/90 - 126/90)  RR: 16 (04-21-21 @ 10:54) (14 - 72)  SpO2: 100% (04-21-21 @ 10:54) (99% - 100%)  Height (cm): 157.5 (04-21-21 @ 10:54)  Weight (kg): 58 (04-21-21 @ 10:54)  BMI (kg/m2): 23.4 (04-21-21 @ 10:54)  BSA (m2): 1.58 (04-21-21 @ 10:54)    04-20-21 @ 07:01  -  04-21-21 @ 07:00  --------------------------------------------------------  IN: 783 mL / OUT: 2030 mL / NET: -1247 mL    04-21-21 @ 07:01  -  04-21-21 @ 11:19  --------------------------------------------------------  IN: 30 mL / OUT: 10 mL / NET: 20 mL    Physical Exam:  	Gen: Intubated  	Pulm: CTA B/L  	CV: RRR, S1S2  	Abd: +BS, soft, nondistended  	: Ravinder  	LE: Warm,  edema  	Vascular access: TDC    LABS/STUDIES  --------------------------------------------------------------------------------              7.8    10.11 >-----------<  268      [04-21-21 @ 04:40]              25.4     139  |  100  |  49  ----------------------------<  170      [04-21-21 @ 04:40]  4.0   |  27  |  2.8        Ca     8.0     [04-21-21 @ 04:40]      Mg     2.1     [04-21-21 @ 04:40]      Phos  2.1     [04-21-21 @ 04:40]    TPro  5.0  /  Alb  2.5  /  TBili  0.4  /  DBili  x   /  AST  9   /  ALT  <5  /  AlkPhos  133  [04-21-21 @ 04:40]    PT/INR: PT 14.70, INR 1.28       [04-20-21 @ 04:50]  PTT: 26.2       [04-21-21 @ 04:40]    Creatinine Trend:  SCr 2.8 [04-21 @ 04:40]  SCr 3.8 [04-20 @ 04:50]  SCr 3.4 [04-19 @ 05:25]  SCr 2.9 [04-18 @ 04:30]  SCr 3.6 [04-17 @ 04:27]    Urinalysis - [04-02-21 @ 10:44]      Color Yellow / Appearance Turbid / SG 1.027 / pH 7.0      Gluc Negative / Ketone Negative  / Bili Negative / Urobili <2 mg/dL       Blood Moderate / Protein 100 mg/dL / Leuk Est Large / Nitrite Negative      RBC 14 / WBC >720 / Hyaline 16 / Gran  / Sq Epi  / Non Sq Epi 3 / Bacteria Moderate    Iron 55, TIBC 167, %sat 33      [05-27-20 @ 13:22]  Ferritin 636      [05-27-20 @ 13:22]  HbA1c 5.7      [11-09-19 @ 01:20]  TSH 1.71      [04-06-21 @ 04:40]

## 2021-04-22 NOTE — PROGRESS NOTE ADULT - ASSESSMENT
1. Cardiac Arrest: secondary to VFIB in nature, continue supportive care, monitor for now. Monitor for now.    2. Acute Hypoxic Respiratory Failure: secondary to septic/ cardiogenic shock, PNA, continue on lung protective mechanical ventilation for now, will wean FIO2 for sats greater then 92%,  tx nebs, pulmonary toilet, continue supportive care monitor for now. s/p trach, continue supportive care, monitor for now.     3. Mixed Cardiogenic/Septic Shock: secondary to PNA, will continue empiric abx as per ID, off Jose D-Synephrine gtt will, continue monitor for now.    4. Acute Biventricular CHF EF 38% in setting of Severe Aortic stenosis and Mod-Severe MR: continue supportive care s/p BiVICD by EP, monitor for now.    5. PNA: s/p course of abx as per ID, continue pulmonary toilet tx nebs, monitor for now,    6. VFIB: tx amio daily, most likley ischemic in nature. F/u Cardiology recs.    7. B/l Rib Fractures: from fall continue pain control.    8. Right Humeral Fracture: continue supportive care as per Ortho, monitor for now.    9. Right Intertrochanteric Fracture: continue supportive care as per Ortho, s/p Intramedullary rodding of fracture of right femur   monitor for now.     10. Odontoid Fracture: continue nec collar as per Neurology f/u Recs.    11. Seizures: tx AED's as per Neurology monitor for now.    12. Anemia: secondary to hip fracture and AOC transfuse as needed for less then 7, continue supportive care, monitor for now.    13. Sacral Decubitus/ Abscess s/p I/D, f/u surgery recs.    14. CAD s/p CABG: tx ASA, continue supportive care, monitor for now.     15. Bladder Prolapse with Chronic Castellon: continue Castellon monitor for now.    16. DM: tx insulin sliding scale, monitor BSG's, continue supportive care.    17. ESRD on HD: continue renal replacement therapy as per nephrology, monitor for now.    18. DVT/ GI px: tx PPI/ hep gtt.     19. NUT: tx TF's.    20. Afib: tx amio oral, continue supportive care, monitor for now.    21. Left upper ext DVT: tx hep gtt continue supportive care, monitor for now.    22. HTN: Pt is hypotensive tx midodrine continue supportive care, monitor for now.     awaiting peg tube    Discussed case with  family

## 2021-04-22 NOTE — PROGRESS NOTE ADULT - SUBJECTIVE AND OBJECTIVE BOX
Progress Note: General Surgery  Patient: GUS WILBURN , 70y (1950)Female   MRN: [id number]  Location: [address]  Visit: 04-01-21 Inpatient  Date: 04-22-21 @ 12:58    Admit Diagnosis/Chief Complaint: Personal history of sudden cardiac death (SCD) resuscitated    Cardiomyopathy    Afib    Hip fracture, right    Respiratory failure        Procedure/Diagnosis: Afib    Cardiomyopathy    Personal history of sudden cardiac death (SCD) resuscitated    Hip fracture, right    Respiratory failure     S/P Implantation of biventricular defibrillator    Intramedullary rodding of fracture of right femur    Creation, tracheostomy, planned, adult      Events/ 24h: No acute events overnight. Pain controlled.    Vitals: T(F): 97.2 (04-22-21 @ 12:00), Max: 98.2 (04-21-21 @ 16:00)  HR: 94 (04-22-21 @ 12:00)  BP: --  RR: 17 (04-22-21 @ 12:00)  SpO2: 100% (04-22-21 @ 12:00)  FiO2: 30, PEEP: 5  In:   04-21-21 @ 07:01  -  04-22-21 @ 07:00  --------------------------------------------------------  IN: 1120 mL    04-22-21 @ 07:01  -  04-22-21 @ 12:58  --------------------------------------------------------  IN: 112.5 mL      Out:   04-21-21 @ 07:01  -  04-22-21 @ 07:00  --------------------------------------------------------  OUT:    Heparin: 0 mL    Indwelling Catheter - Urethral (mL): 160 mL  Total OUT: 160 mL      04-22-21 @ 07:01  -  04-22-21 @ 12:58  --------------------------------------------------------  OUT:    Indwelling Catheter - Urethral (mL): 5 mL    Other (mL): 2000 mL  Total OUT: 2005 mL        Net:   04-21-21 @ 07:01  -  04-22-21 @ 07:00  --------------------------------------------------------  NET: 960 mL    04-22-21 @ 07:01  -  04-22-21 @ 12:58  --------------------------------------------------------  NET: -1892.5 mL        Diet: Diet, NPO with Tube Feed:   Tube Feeding Modality: Orogastric  Peptamen A.F. Formula  Total Volume for 24 Hours (mL): 1080  Continuous  Until Goal Tube Feed Rate (mL per Hour): 45  Tube Feed Duration (in Hours): 24  Tube Feed Start Time: 19:00  Chris(7 Gm Arginine/7 Gm Glut/1.2 Gm HMB     Qty per Day:  2 (04-20-21 @ 09:03)    IV Fluids: dextrose 5%. 1000 milliLiter(s) (50 mL/Hr) IV Continuous <Continuous>  dextrose 5%. 1000 milliLiter(s) (100 mL/Hr) IV Continuous <Continuous>  doxercalciferol Injectable 2 MICROGram(s) IV Push <User Schedule>      Physical Examination:  General Appearance: Trach in place on vent  HEENT: EOMI, sclera non-icteric.  Heart: RRR   Lungs: CTABL.   Abdomen:  Soft,  nondistended.         Medications: [Standing]  aMIOdarone    Tablet 200 milliGRAM(s) Oral daily  aspirin  chewable 81 milliGRAM(s) Oral daily  atorvastatin 40 milliGRAM(s) Oral at bedtime  BACItracin   Ointment 1 Application(s) Topical every 12 hours  chlorhexidine 0.12% Liquid 15 milliLiter(s) Oral Mucosa every 12 hours  collagenase Ointment 1 Application(s) Topical two times a day  dextrose 40% Gel 15 Gram(s) Oral once  dextrose 5%. 1000 milliLiter(s) (50 mL/Hr) IV Continuous <Continuous>  dextrose 5%. 1000 milliLiter(s) (100 mL/Hr) IV Continuous <Continuous>  dextrose 50% Injectable 25 Gram(s) IV Push once  dextrose 50% Injectable 12.5 Gram(s) IV Push once  dextrose 50% Injectable 25 Gram(s) IV Push once  doxercalciferol Injectable 2 MICROGram(s) IV Push <User Schedule>  epoetin mana-epbx (RETACRIT) Injectable 03868 Unit(s) IV Push <User Schedule>  estrogens  Cream 1 Gram(s) Vaginal at bedtime  glucagon  Injectable 1 milliGRAM(s) IntraMuscular once  heparin  Infusion. 1300 Unit(s)/Hr (13 mL/Hr) IV Continuous <Continuous>  insulin lispro (ADMELOG) corrective regimen sliding scale   SubCutaneous Before meals and at bedtime  lactulose Syrup 15 Gram(s) Oral three times a day  levETIRAcetam  IVPB 375 milliGRAM(s) IV Intermittent every 12 hours  levETIRAcetam  IVPB 250 milliGRAM(s) IV Intermittent <User Schedule>  midodrine 10 milliGRAM(s) Oral every 8 hours  pantoprazole  Injectable 40 milliGRAM(s) IV Push daily  polyethylene glycol 3350 17 Gram(s) Oral at bedtime  senna 2 Tablet(s) Oral at bedtime  sertraline 50 milliGRAM(s) Oral daily  valproic  acid Syrup 250 milliGRAM(s) Oral two times a day    DVT Prophylaxis: heparin  Infusion. 1300 Unit(s)/Hr IV Continuous <Continuous>    GI Prophylaxis: pantoprazole  Injectable 40 milliGRAM(s) IV Push daily    Antibiotics:   Anticoagulation:   Medications:[PRN]  acetaminophen   Tablet .. 650 milliGRAM(s) Oral every 6 hours PRN  HYDROmorphone  Injectable 0.5 milliGRAM(s) IV Push every 4 hours PRN      Labs:                        8.1    16.05 )-----------( 365      ( 22 Apr 2021 04:10 )             26.5     04-22    136  |  98  |  56<H>  ----------------------------<  205<H>  4.3   |  27  |  3.3<H>    Ca    8.5      22 Apr 2021 04:10  Phos  2.4     04-22  Mg     2.2     04-22    TPro  5.2<L>  /  Alb  2.5<L>  /  TBili  0.4  /  DBili  x   /  AST  10  /  ALT  <5  /  AlkPhos  154<H>  04-22    LIVER FUNCTIONS - ( 22 Apr 2021 04:10 )  Alb: 2.5 g/dL / Pro: 5.2 g/dL / ALK PHOS: 154 U/L / ALT: <5 U/L / AST: 10 U/L / GGT: x           PTT - ( 22 Apr 2021 04:10 )  PTT:28.3 sec  ABG - ( 22 Apr 2021 02:19 )  pH: 7.41  /  pCO2: 46    /  pO2: 128   / HCO3: 30    / Base Excess: 4.3   /  SaO2: 99                      Urine/Micro:        Imaging:   ***  Xray Chest 1 View- PORTABLE-Urgent:   EXAM:  XR CHEST PORTABLE URGENT 1V            PROCEDURE DATE:  04/21/2021            INTERPRETATION:  Clinical History / Reason for exam: Respiratory failure    Comparison : Chest radiograph 4/21/2021.    Technique/Positioning: Single frontal chestradiograph.    Findings:    Support devices: Endotracheal tube in satisfactory position. Feeding tube coursing below the field-of-view. Right-sided dialysis catheter in unchanged position. Left chest ICD redemonstrated. Atrial appendage clip noted.    Cardiac/mediastinum/hilum: Unchanged    Lung parenchyma/Pleura: Persistent left basilar opacity/effusion. No pneumothorax.    Skeleton/soft tissues: Unchanged. Right proximal humeral fracture redemonstrated. Rib fractures redemonstrated.    Impression:    Persistent left basilar opacity/effusion.                  OSCAR ROBBINS MD; Attending Radiologist  This document has been electronically signed. Apr 21 2021  3:55PM (04-21-21 @ 14:54)

## 2021-04-22 NOTE — PROGRESS NOTE ADULT - ASSESSMENT
1. Cardiac Arrest: secondary to VFIB in nature, continue supportive care, monitor for now. Monitor for now. Biventricular AICD in place. Vfib also on 200mg amiodarone daily    2. Acute Hypoxic Respiratory Failure: secondary to septic/ cardiogenic shock, PNA, continue on lung protective mechanical ventilation for now, will wean FIO2 for sats greater then 92%,  tx nebs, pulmonary toilet, continue supportive care monitor for now. awaiting tracheostomy, f/u surgery regarding timing    3. Mixed Cardiogenic/Septic Shock: secondary to PNA, will continue empiric abx as per ID, off Neosynephrine gtt will, continue monitor for now. s/p course of abx as per ID, continue pulmonary toilet tx nebs, monitor for now,    4. Right Intertrochanteric Fracture: continue supportive care as per Ortho, s/p Intramedullary rodding of fracture of right femur   monitor for now.     5. Right Humeral Fracture: continue supportive care as per Ortho, monitor for now.    6. B/l Rib Fractures: from fall continue pain control.    7. Odontoid Fracture: continue nec collar 8 week course    8. Seizures: on keppra     9. Anemia: secondary to hip fracture and AOC transfuse as needed for less then 7, continue supportive care, monitor for now.    10. Sacral Decubitus/ Abscess s/p I/D, f/u surgery recs.    11. CAD s/p CABG: tx ASA, continue supportive care, monitor for now.     12. uterine prolapse with Chronic Castellon: continue Castellon monitor for now.    13. DM: tx insulin sliding scale, monitor BSG's, continue supportive care.    14. ESRD on HD: continue renal replacement therapy as per nephrology, monitor for now.    15. DVT/ GI px: tx PPI/ hep gtt.     16Left upper ext DVT: tx hep gtt continue supportive care, monitor for now. restart on heparin 24 hours s/p trach and peg which is approx 3pm today    hold coumadin bridge for now, Dr. Mota will take for G-tube placement tomorrow

## 2021-04-22 NOTE — PROGRESS NOTE ADULT - SUBJECTIVE AND OBJECTIVE BOX
GUS WILBURN 70y Female  MRN#: [id number]       SUBJECTIVE  Patient is a 70y old Female who presents with a chief complaint of Resp failure (21 Apr 2021 10:26)      ***    Today is hospital day 21d, and this morning she is _.    No acute overnight events.     OBJECTIVE  PAST MEDICAL & SURGICAL HISTORY  Diabetes    Congestive heart failure (CHF)    Pleural effusion due to congestive heart failure    End stage renal disease  on dialysis Mon, Wed, Fr    Uterine prolapse    Chronic indwelling Castellon catheter    History of repair of hip fracture  2019    S/P CABG (coronary artery bypass graft)  11/2019    History of thoracentesis    Chronic indwelling Castellon catheter      ALLERGIES:  No Known Allergies    MEDICATIONS:  STANDING MEDICATIONS  aMIOdarone    Tablet 200 milliGRAM(s) Oral daily  aspirin  chewable 81 milliGRAM(s) Oral daily  atorvastatin 40 milliGRAM(s) Oral at bedtime  BACItracin   Ointment 1 Application(s) Topical every 12 hours  chlorhexidine 0.12% Liquid 15 milliLiter(s) Oral Mucosa every 12 hours  collagenase Ointment 1 Application(s) Topical two times a day  dextrose 40% Gel 15 Gram(s) Oral once  dextrose 5%. 1000 milliLiter(s) IV Continuous <Continuous>  dextrose 5%. 1000 milliLiter(s) IV Continuous <Continuous>  dextrose 50% Injectable 25 Gram(s) IV Push once  dextrose 50% Injectable 12.5 Gram(s) IV Push once  dextrose 50% Injectable 25 Gram(s) IV Push once  doxercalciferol Injectable 2 MICROGram(s) IV Push <User Schedule>  epoetin mana-epbx (RETACRIT) Injectable 61979 Unit(s) IV Push <User Schedule>  estrogens  Cream 1 Gram(s) Vaginal at bedtime  glucagon  Injectable 1 milliGRAM(s) IntraMuscular once  heparin  Infusion. 1300 Unit(s)/Hr IV Continuous <Continuous>  insulin lispro (ADMELOG) corrective regimen sliding scale   SubCutaneous Before meals and at bedtime  lactulose Syrup 15 Gram(s) Oral three times a day  levETIRAcetam  IVPB 375 milliGRAM(s) IV Intermittent every 12 hours  levETIRAcetam  IVPB 250 milliGRAM(s) IV Intermittent <User Schedule>  midodrine 10 milliGRAM(s) Oral every 8 hours  pantoprazole  Injectable 40 milliGRAM(s) IV Push daily  polyethylene glycol 3350 17 Gram(s) Oral at bedtime  senna 2 Tablet(s) Oral at bedtime  sertraline 50 milliGRAM(s) Oral daily  valproic  acid Syrup 250 milliGRAM(s) Oral two times a day    PRN MEDICATIONS  acetaminophen   Tablet .. 650 milliGRAM(s) Oral every 6 hours PRN  HYDROmorphone  Injectable 0.5 milliGRAM(s) IV Push every 4 hours PRN      VITAL SIGNS: Last 24 Hours  T(C): 36.2 (22 Apr 2021 12:00), Max: 36.8 (21 Apr 2021 16:00)  T(F): 97.2 (22 Apr 2021 12:00), Max: 98.2 (21 Apr 2021 16:00)  HR: 94 (22 Apr 2021 12:00) (68 - 100)  BP: --  BP(mean): --  RR: 17 (22 Apr 2021 12:00) (12 - 28)  SpO2: 100% (22 Apr 2021 12:00) (94% - 100%)    LABS:                        8.1    16.05 )-----------( 365      ( 22 Apr 2021 04:10 )             26.5     04-22    136  |  98  |  56<H>  ----------------------------<  205<H>  4.3   |  27  |  3.3<H>    Ca    8.5      22 Apr 2021 04:10  Phos  2.4     04-22  Mg     2.2     04-22    TPro  5.2<L>  /  Alb  2.5<L>  /  TBili  0.4  /  DBili  x   /  AST  10  /  ALT  <5  /  AlkPhos  154<H>  04-22    PTT - ( 22 Apr 2021 04:10 )  PTT:28.3 sec    ABG - ( 22 Apr 2021 02:19 )  pH, Arterial: 7.41  pH, Blood: x     /  pCO2: 46    /  pO2: 128   / HCO3: 30    / Base Excess: 4.3   /  SaO2: 99          RADIOLOGY: reviewed       PHYSICAL EXAM:    GENERAL: NAD  HEENT:   conjunctiva and sclera clear, No JVD  PULMONARY: Clear to auscultation bilaterally; No wheeze  CARDIOVASCULAR: Regular rate and rhythm; No murmurs, rubs, or gallops  GASTROINTESTINAL: Soft, Nontender, Nondistended; Bowel sounds present  EXT: no clubbing or edema  NEUROLOGY: non-focal      ADMISSION SUMMARY  Patient is a 70y old Female who presents with a chief complaint of Resp failure (21 Apr 2021 10:26)

## 2021-04-22 NOTE — PROGRESS NOTE ADULT - SUBJECTIVE AND OBJECTIVE BOX
Patient is a 70y old  Female who presents with a chief complaint of Resp failure (21 Apr 2021 10:26)        Over Night Events:        ROS:     All ROS are negative except HPI         PHYSICAL EXAM    ICU Vital Signs Last 24 Hrs  T(C): 36.2 (22 Apr 2021 12:00), Max: 36.8 (21 Apr 2021 16:00)  T(F): 97.2 (22 Apr 2021 12:00), Max: 98.2 (21 Apr 2021 16:00)  HR: 94 (22 Apr 2021 12:00) (68 - 100)  BP: --  BP(mean): --  ABP: 142/64 (22 Apr 2021 12:00) (124/54 - 152/66)  ABP(mean): 94 (22 Apr 2021 12:00) (80 - 100)  RR: 17 (22 Apr 2021 12:00) (12 - 28)  SpO2: 100% (22 Apr 2021 12:00) (94% - 100%)      CONSTITUTIONAL:  Pt remains chronically ill in ICU on MV    ENT:   pos Et tube    EYES:   Pupils equal,   Round and reactive to light.    CARDIAC:   Normal rate,   Regular rhythm.    No edema      Vascular:  Normal systolic impulse  No Carotid bruits    RESPIRATORY: rhonchi noted    GASTROINTESTINAL:  Abdomen soft,   Non-tender,   No guarding,   + BS    MUSCULOSKELETAL:   Range of motion is not limited,  No clubbing, cyanosis    NEUROLOGICAL:   Alert and oriented   No motor  deficits.    SKIN:   Skin normal color for race,   Warm and dry and intact.   No evidence of rash.    PSYCHIATRIC:   Normal mood and affect.   No apparent risk to self or others.    HEMATOLOGICAL:  No cervical  lymphadenopathy.  no inguinal lymphadenopathy      04-21-21 @ 07:01  -  04-22-21 @ 07:00  --------------------------------------------------------  IN:    Dexmedetomidine: 60 mL    Enteral Tube Flush: 130 mL    IV PiggyBack: 300 mL    Peptamen A.F.: 630 mL  Total IN: 1120 mL    OUT:    Heparin: 0 mL    Indwelling Catheter - Urethral (mL): 160 mL  Total OUT: 160 mL    Total NET: 960 mL      04-22-21 @ 07:01  -  04-22-21 @ 13:44  --------------------------------------------------------  IN:    Peptamen A.F.: 112.5 mL  Total IN: 112.5 mL    OUT:    Indwelling Catheter - Urethral (mL): 5 mL    Other (mL): 2000 mL  Total OUT: 2005 mL    Total NET: -1892.5 mL          LABS:                            8.1    16.05 )-----------( 365      ( 22 Apr 2021 04:10 )             26.5                                               04-22    136  |  98  |  56<H>  ----------------------------<  205<H>  4.3   |  27  |  3.3<H>    Ca    8.5      22 Apr 2021 04:10  Phos  2.4     04-22  Mg     2.2     04-22    TPro  5.2<L>  /  Alb  2.5<L>  /  TBili  0.4  /  DBili  x   /  AST  10  /  ALT  <5  /  AlkPhos  154<H>  04-22      PTT - ( 22 Apr 2021 04:10 )  PTT:28.3 sec                                                                                     LIVER FUNCTIONS - ( 22 Apr 2021 04:10 )  Alb: 2.5 g/dL / Pro: 5.2 g/dL / ALK PHOS: 154 U/L / ALT: <5 U/L / AST: 10 U/L / GGT: x                                                                                               Mode: CPAP with PS  FiO2: 30  PEEP: 5  PS: 15                                      ABG - ( 22 Apr 2021 02:19 )  pH, Arterial: 7.41  pH, Blood: x     /  pCO2: 46    /  pO2: 128   / HCO3: 30    / Base Excess: 4.3   /  SaO2: 99                  MEDICATIONS  (STANDING):  aMIOdarone    Tablet 200 milliGRAM(s) Oral daily  aspirin  chewable 81 milliGRAM(s) Oral daily  atorvastatin 40 milliGRAM(s) Oral at bedtime  BACItracin   Ointment 1 Application(s) Topical every 12 hours  BACItracin   Ointment 1 Application(s) Topical every 12 hours  BACItracin   Ointment 1 Application(s) Topical every 12 hours  chlorhexidine 0.12% Liquid 15 milliLiter(s) Oral Mucosa every 12 hours  collagenase Ointment 1 Application(s) Topical two times a day  collagenase Ointment 1 Application(s) Topical every 12 hours  dextrose 40% Gel 15 Gram(s) Oral once  dextrose 5%. 1000 milliLiter(s) (50 mL/Hr) IV Continuous <Continuous>  dextrose 5%. 1000 milliLiter(s) (100 mL/Hr) IV Continuous <Continuous>  dextrose 50% Injectable 25 Gram(s) IV Push once  dextrose 50% Injectable 12.5 Gram(s) IV Push once  dextrose 50% Injectable 25 Gram(s) IV Push once  doxercalciferol Injectable 2 MICROGram(s) IV Push <User Schedule>  epoetin mana-epbx (RETACRIT) Injectable 69435 Unit(s) IV Push <User Schedule>  estrogens  Cream 1 Gram(s) Vaginal at bedtime  glucagon  Injectable 1 milliGRAM(s) IntraMuscular once  heparin  Infusion. 1300 Unit(s)/Hr (13 mL/Hr) IV Continuous <Continuous>  insulin lispro (ADMELOG) corrective regimen sliding scale   SubCutaneous Before meals and at bedtime  lactulose Syrup 15 Gram(s) Oral three times a day  levETIRAcetam  IVPB 375 milliGRAM(s) IV Intermittent every 12 hours  levETIRAcetam  IVPB 250 milliGRAM(s) IV Intermittent <User Schedule>  midodrine 10 milliGRAM(s) Oral every 8 hours  pantoprazole  Injectable 40 milliGRAM(s) IV Push daily  polyethylene glycol 3350 17 Gram(s) Oral at bedtime  senna 2 Tablet(s) Oral at bedtime  sertraline 50 milliGRAM(s) Oral daily  valproic  acid Syrup 250 milliGRAM(s) Oral two times a day    MEDICATIONS  (PRN):  acetaminophen   Tablet .. 650 milliGRAM(s) Oral every 6 hours PRN Temp greater or equal to 38C (100.4F)  HYDROmorphone  Injectable 0.5 milliGRAM(s) IV Push every 4 hours PRN Severe Pain (7 - 10)      New X-rays reviewed:                                                                                  ECHO    CXR interpreted by me:

## 2021-04-22 NOTE — PROGRESS NOTE ADULT - ASSESSMENT
1. Odontoid fracture. Neurosurgery following.  2. R hip / R humerus fracture. Ortho following. S/P ORIF.  3. ESRD on HD TTS. HD today: 3 hours, opti 160 dialyzer, 2K bath, 2L UF.  4. Anemia. EPO with HD.  5. Secondary hyperparathyroidism. Hectorol with HD.

## 2021-04-22 NOTE — PRE-ANESTHESIA EVALUATION ADULT - NS MD HP INPLANTS MED DEV
hardware in Left hip/None

## 2021-04-22 NOTE — PROGRESS NOTE ADULT - SUBJECTIVE AND OBJECTIVE BOX
ORTHO PROGRESS NOTE    Interval History:  POD7 s/p R femur IMN   Patient seen and examined at bedside.  Following basic commands  Distal aquacel dressing with mild serous drainage  Prevena functioning.    Vital Signs Last 24 Hrs  T(C): 37.6 (18 Apr 2021 04:00), Max: 37.7 (17 Apr 2021 20:00)  T(F): 99.6 (18 Apr 2021 04:00), Max: 99.9 (17 Apr 2021 20:00)  HR: 84 (18 Apr 2021 06:00) (82 - 106)  RR: 12 (18 Apr 2021 06:00) (12 - 23)  SpO2: 100% (18 Apr 2021 06:00) (97% - 100%)    Physical Exam:   Dressing C/D/I, small amount of serous drainage at distal aquacel dressing  Prevena functioning  Following basic commands, able to wiggle toes and sensation grossly intact in LEs  CR<2sec, palpable pulses                   8.4    11.62 )-----------( 303      ( 18 Apr 2021 04:30 )             26.7     142  |  102  |  39<H>  ----------------------------<  238<H>  3.8   |  25  |  2.9<H>    Ca    8.7      18 Apr 2021 04:30  Phos  3.2     04-18  Mg     1.9     04-18  TPro  5.1<L>  /  Alb  2.5<L>  /  TBili  0.3  /  DBili  x   /  AST  14  /  ALT  <5  /  AlkPhos  143<H>  04-18  PT/INR - ( 18 Apr 2021 04:30 )   PT: 29.20 sec;   INR: 2.54 ratio    PTT - ( 18 Apr 2021 04:30 )  PTT:32.1 sec    A/P: 70F POD #7 s/p right hip ORIF.    WBAT on RLE  NWB RUE (proximal humerus fracture)  DVT ppx: SCD, ASA ordered.  Pain control  Home medications: resume  Continue trend labs: Transfuse PRN  Rest of management per primary  Dispo: Pending PT recommendations

## 2021-04-22 NOTE — PROGRESS NOTE ADULT - SUBJECTIVE AND OBJECTIVE BOX
[place] NEPHR[facility] FOLLOW UP NOTE  --------------------------------------------------------------------------------  24 hour events/subjective: Patient examined during HD. Trached.    PAST HISTORY  --------------------------------------------------------------------------------  No significant changes to PMH, PSH, FHx, SHx, unless otherwise noted    ALLERGIES & MEDICATIONS  --------------------------------------------------------------------------------  Allergies    No Known Allergies    Standing Inpatient Medications  aMIOdarone    Tablet 200 milliGRAM(s) Oral daily  aspirin  chewable 81 milliGRAM(s) Oral daily  atorvastatin 40 milliGRAM(s) Oral at bedtime  BACItracin   Ointment 1 Application(s) Topical every 12 hours  chlorhexidine 0.12% Liquid 15 milliLiter(s) Oral Mucosa every 12 hours  collagenase Ointment 1 Application(s) Topical two times a day  dextrose 40% Gel 15 Gram(s) Oral once  dextrose 5%. 1000 milliLiter(s) IV Continuous <Continuous>  dextrose 5%. 1000 milliLiter(s) IV Continuous <Continuous>  dextrose 50% Injectable 25 Gram(s) IV Push once  dextrose 50% Injectable 12.5 Gram(s) IV Push once  dextrose 50% Injectable 25 Gram(s) IV Push once  doxercalciferol Injectable 2 MICROGram(s) IV Push <User Schedule>  epoetin mana-epbx (RETACRIT) Injectable 79174 Unit(s) IV Push <User Schedule>  estrogens  Cream 1 Gram(s) Vaginal at bedtime  glucagon  Injectable 1 milliGRAM(s) IntraMuscular once  heparin  Infusion. 1300 Unit(s)/Hr IV Continuous <Continuous>  insulin lispro (ADMELOG) corrective regimen sliding scale   SubCutaneous Before meals and at bedtime  lactulose Syrup 15 Gram(s) Oral three times a day  levETIRAcetam  IVPB 375 milliGRAM(s) IV Intermittent every 12 hours  levETIRAcetam  IVPB 250 milliGRAM(s) IV Intermittent <User Schedule>  midodrine 10 milliGRAM(s) Oral every 8 hours  pantoprazole  Injectable 40 milliGRAM(s) IV Push daily  polyethylene glycol 3350 17 Gram(s) Oral at bedtime  senna 2 Tablet(s) Oral at bedtime  sertraline 50 milliGRAM(s) Oral daily  valproic  acid Syrup 250 milliGRAM(s) Oral two times a day  warfarin 5 milliGRAM(s) Oral once    PRN Inpatient Medications  acetaminophen   Tablet .. 650 milliGRAM(s) Oral every 6 hours PRN  HYDROmorphone  Injectable 0.5 milliGRAM(s) IV Push every 4 hours PRN    VITALS/PHYSICAL EXAM  --------------------------------------------------------------------------------  T(C): 36.4 (04-22-21 @ 08:00), Max: 36.8 (04-21-21 @ 16:00)  HR: 95 (04-22-21 @ 11:15) (68 - 100)  BP: --  RR: 18 (04-22-21 @ 10:00) (12 - 28)  SpO2: 100% (04-22-21 @ 11:15) (94% - 100%)  Wt(kg): --  Height (cm): 157.5 (04-21-21 @ 10:54)  Weight (kg): 58 (04-21-21 @ 10:54)  BMI (kg/m2): 23.4 (04-21-21 @ 10:54)  BSA (m2): 1.58 (04-21-21 @ 10:54)      04-21-21 @ 07:01  -  04-22-21 @ 07:00  --------------------------------------------------------  IN: 1120 mL / OUT: 160 mL / NET: 960 mL    04-22-21 @ 07:01  -  04-22-21 @ 11:48  --------------------------------------------------------  IN: 112.5 mL / OUT: 2005 mL / NET: -1892.5 mL      Physical Exam:  	Gen: Trached  	Pulm: CTA B/L  	CV: RRR, S1S2  	Abd: +BS, soft, nontender/nondistended  	: No suprapubic tenderness  	LE: Warm,  edema  	Vascular access: TDC    LABS/STUDIES  --------------------------------------------------------------------------------              8.1    16.05 >-----------<  365      [04-22-21 @ 04:10]              26.5     136  |  98  |  56  ----------------------------<  205      [04-22-21 @ 04:10]  4.3   |  27  |  3.3        Ca     8.5     [04-22-21 @ 04:10]      Mg     2.2     [04-22-21 @ 04:10]      Phos  2.4     [04-22-21 @ 04:10]    TPro  5.2  /  Alb  2.5  /  TBili  0.4  /  DBili  x   /  AST  10  /  ALT  <5  /  AlkPhos  154  [04-22-21 @ 04:10]    PTT: 28.3       [04-22-21 @ 04:10]    Creatinine Trend:  SCr 3.3 [04-22 @ 04:10]  SCr 2.8 [04-21 @ 04:40]  SCr 3.8 [04-20 @ 04:50]  SCr 3.4 [04-19 @ 05:25]  SCr 2.9 [04-18 @ 04:30]    Urinalysis - [04-02-21 @ 10:44]      Color Yellow / Appearance Turbid / SG 1.027 / pH 7.0      Gluc Negative / Ketone Negative  / Bili Negative / Urobili <2 mg/dL       Blood Moderate / Protein 100 mg/dL / Leuk Est Large / Nitrite Negative      RBC 14 / WBC >720 / Hyaline 16 / Gran  / Sq Epi  / Non Sq Epi 3 / Bacteria Moderate      Iron 55, TIBC 167, %sat 33      [05-27-20 @ 13:22]  Ferritin 636      [05-27-20 @ 13:22]  HbA1c 5.7      [11-09-19 @ 01:20]  TSH 1.71      [04-06-21 @ 04:40]

## 2021-04-22 NOTE — PROGRESS NOTE ADULT - ASSESSMENT
assessment:    69 y/o female s/p trach and PEG    Plan:     s/p  trach, no bleeding no hematoma  Monitor vent settings  Call surgery PRN            Date/Time: 04-22-21 @ 12:58

## 2021-04-23 NOTE — PRE-OP CHECKLIST - NOTHING BY MOUTH SINCE
14-Apr-2021 00:00
23-Apr-2021 00:00
20-Apr-2021 06:00
21-Apr-2021 00:00
07-Apr-2021 00:00
12-Apr-2021 15:12

## 2021-04-23 NOTE — PROGRESS NOTE ADULT - SUBJECTIVE AND OBJECTIVE BOX
SUBJ: No new complains      MEDICATIONS  (STANDING):  aMIOdarone    Tablet 200 milliGRAM(s) Oral daily  aspirin  chewable 81 milliGRAM(s) Oral daily  atorvastatin 40 milliGRAM(s) Oral at bedtime  BACItracin   Ointment 1 Application(s) Topical every 12 hours  BACItracin   Ointment 1 Application(s) Topical every 12 hours  chlorhexidine 0.12% Liquid 15 milliLiter(s) Oral Mucosa every 12 hours  collagenase Ointment 1 Application(s) Topical every 12 hours  dextrose 40% Gel 15 Gram(s) Oral once  dextrose 5%. 1000 milliLiter(s) (50 mL/Hr) IV Continuous <Continuous>  dextrose 5%. 1000 milliLiter(s) (100 mL/Hr) IV Continuous <Continuous>  dextrose 50% Injectable 25 Gram(s) IV Push once  dextrose 50% Injectable 12.5 Gram(s) IV Push once  dextrose 50% Injectable 25 Gram(s) IV Push once  doxercalciferol Injectable 2 MICROGram(s) IV Push <User Schedule>  epoetin mana-epbx (RETACRIT) Injectable 51050 Unit(s) IV Push <User Schedule>  estrogens  Cream 1 Gram(s) Vaginal at bedtime  glucagon  Injectable 1 milliGRAM(s) IntraMuscular once  insulin lispro (ADMELOG) corrective regimen sliding scale   SubCutaneous Before meals and at bedtime  lactulose Syrup 15 Gram(s) Oral three times a day  levETIRAcetam  IVPB 375 milliGRAM(s) IV Intermittent every 12 hours  levETIRAcetam  IVPB 250 milliGRAM(s) IV Intermittent <User Schedule>  midodrine 10 milliGRAM(s) Oral every 8 hours  pantoprazole  Injectable 40 milliGRAM(s) IV Push daily  polyethylene glycol 3350 17 Gram(s) Oral at bedtime  senna 2 Tablet(s) Oral at bedtime  sertraline 50 milliGRAM(s) Oral daily  valproic  acid Syrup 250 milliGRAM(s) Oral two times a day    MEDICATIONS  (PRN):  acetaminophen   Tablet .. 650 milliGRAM(s) Oral every 6 hours PRN Temp greater or equal to 38C (100.4F)  HYDROmorphone  Injectable 0.5 milliGRAM(s) IV Push every 4 hours PRN Severe Pain (7 - 10)            Vital Signs Last 24 Hrs  T(C): 36.7 (23 Apr 2021 08:00), Max: 36.7 (23 Apr 2021 08:00)  T(F): 98 (23 Apr 2021 08:00), Max: 98 (23 Apr 2021 08:00)  HR: 90 (23 Apr 2021 10:00) (84 - 106)  BP: 127/59 (23 Apr 2021 10:00) (109/54 - 140/66)  BP(mean): 88 (23 Apr 2021 10:00) (76 - 91)  RR: 24 (23 Apr 2021 10:00) (14 - 26)  SpO2: 100% (23 Apr 2021 10:00) (95% - 100%)     REVIEW OF SYSTEMS:  UTO    PHYSICAL EXAM:  · CONSTITUTIONAL:	Well-developed, well nourished    BMI-  ·RESPIRATORY:   airway patent; breath sounds equal; good air movement; respirations non-labored; clear to auscultation bilaterally; no chest wall tenderness; no intercostal retractions; no rales,rhonchi or wheeze  · CARDIOVASCULAR	regular rate and rhythm  no rub  no murmur  normal PMI  · EXTREMITIES: No cyanosis, clubbing or edema  · VASCULAR: 	Equal and normal pulses (carotid, femoral, dorsalis pedis)  	  TELEMETRY:    ECG:    TTE:    LABS:                        7.9    17.17 )-----------( 321      ( 23 Apr 2021 04:20 )             25.5     04-23    137  |  97<L>  |  38<H>  ----------------------------<  146<H>  3.7   |  29  |  2.7<H>    Ca    8.3<L>      23 Apr 2021 04:20  Phos  2.2     04-23  Mg     2.1     04-23    TPro  5.2<L>  /  Alb  2.6<L>  /  TBili  0.5  /  DBili  x   /  AST  11  /  ALT  <5  /  AlkPhos  167<H>  04-23        PT/INR - ( 23 Apr 2021 04:20 )   PT: 13.50 sec;   INR: 1.17 ratio         PTT - ( 22 Apr 2021 20:58 )  PTT:46.8 sec    I&O's Summary    22 Apr 2021 07:01  -  23 Apr 2021 07:00  --------------------------------------------------------  IN: 1181.5 mL / OUT: 2015 mL / NET: -833.5 mL    23 Apr 2021 07:01  -  23 Apr 2021 12:22  --------------------------------------------------------  IN: 40 mL / OUT: 0 mL / NET: 40 mL      BNP  RADIOLOGY & ADDITIONAL STUDIES:    IMPRESSION AND PLAN:    CAD post cabg and patent graft  AS moderate   HFrEF 40%  continue with current medications

## 2021-04-23 NOTE — PRE-OP CHECKLIST - SELECT TESTS ORDERED
BMP/CBC/CMP/PT/PTT/EKG/CXR/POCT Blood Glucose/COVID-19
Results in MD note
Results in MD note
BMP/CBC/CMP/PT/PTT/INR/Type and Screen/EKG/CXR/POCT Blood Glucose/COVID-19
BMP/CBC/PT/PTT/INR/Hepatic Function/COVID-19
BMP/CBC/CMP/PT/PTT/INR/Type and Cross/Type and Screen/Urinalysis/COVID-19

## 2021-04-23 NOTE — PROGRESS NOTE ADULT - ASSESSMENT
70y Female patient   Patient seen and examined at bedside. NAD.   Tolerating:  Diet, NPO after Midnight:      NPO Start Date: 22-Apr-2021,   NPO Start Time: 23:59 (04-22-21 @ 19:27)  Diet, NPO with Tube Feed:   Tube Feeding Modality: Orogastric  Peptamen A.F. Formula  Total Volume for 24 Hours (mL): 1080  Continuous  Until Goal Tube Feed Rate (mL per Hour): 45  Tube Feed Duration (in Hours): 24  Tube Feed Start Time: 19:00  Chris(7 Gm Arginine/7 Gm Glut/1.2 Gm HMB     Qty per Day:  2 (04-20-21 @ 09:03)      Plan:  pre op for open G tube tmrw in the OR  - Monitor vitals  - Monitor labs and replete as necessary  - Monitor for bowel function  - Continue Pain Medications if necessary    Date/Time: 04-23-21 @ 00:49

## 2021-04-23 NOTE — PROGRESS NOTE ADULT - SUBJECTIVE AND OBJECTIVE BOX
Patient is a 70y old  Female who presents with a chief complaint of Cardiac arrest (23 Apr 2021 12:22)        Pt remains chronically ill in ICU on MV         ROS:     All ROS are negative except HPI         PHYSICAL EXAM    ICU Vital Signs Last 24 Hrs  T(C): 36.8 (23 Apr 2021 12:00), Max: 36.8 (23 Apr 2021 12:00)  T(F): 98.2 (23 Apr 2021 12:00), Max: 98.2 (23 Apr 2021 12:00)  HR: 90 (23 Apr 2021 14:00) (84 - 106)  BP: 112/56 (23 Apr 2021 14:00) (109/54 - 140/66)  BP(mean): 79 (23 Apr 2021 14:00) (76 - 91)  ABP: --  ABP(mean): --  RR: 22 (23 Apr 2021 14:00) (14 - 29)  SpO2: 100% (23 Apr 2021 14:00) (95% - 100%)      CONSTITUTIONAL:  Pt remains chronically ill in ICU on MV     ENT:   pos Trach    EYES:   Pupils equal,   Round and reactive to light.    CARDIAC:   Normal rate,   Regular rhythm.    No edema      Vascular:  Normal systolic impulse  No Carotid bruits    RESPIRATORY:   No wheezing  Bilateral BS  Normal chest expansion  Not tachypneic,  No use of accessory muscles    GASTROINTESTINAL:  Abdomen soft,   Non-tender,   No guarding,   + BS    MUSCULOSKELETAL:   Range of motion is not limited,  No clubbing, cyanosis    NEUROLOGICAL:   Alert and oriented   No motor  deficits.    SKIN:   Skin normal color for race,   Warm and dry and intact.   No evidence of rash.    PSYCHIATRIC:   Normal mood and affect.   No apparent risk to self or others.    HEMATOLOGICAL:  No cervical  lymphadenopathy.  no inguinal lymphadenopathy      04-22-21 @ 07:01  -  04-23-21 @ 07:00  --------------------------------------------------------  IN:    Enteral Tube Flush: 160 mL    Heparin: 52 mL    Heparin Infusion: 117 mL    IV PiggyBack: 200 mL    Peptamen A.F.: 652.5 mL  Total IN: 1181.5 mL    OUT:    Indwelling Catheter - Urethral (mL): 15 mL    Other (mL): 2000 mL  Total OUT: 2015 mL    Total NET: -833.5 mL      04-23-21 @ 07:01  -  04-23-21 @ 15:55  --------------------------------------------------------  IN:    Enteral Tube Flush: 40 mL  Total IN: 40 mL    OUT:  Total OUT: 0 mL    Total NET: 40 mL          LABS:                            7.9    17.17 )-----------( 321      ( 23 Apr 2021 04:20 )             25.5                                               04-23    137  |  97<L>  |  38<H>  ----------------------------<  146<H>  3.7   |  29  |  2.7<H>    Ca    8.3<L>      23 Apr 2021 04:20  Phos  2.2     04-23  Mg     2.1     04-23    TPro  5.2<L>  /  Alb  2.6<L>  /  TBili  0.5  /  DBili  x   /  AST  11  /  ALT  <5  /  AlkPhos  167<H>  04-23      PT/INR - ( 23 Apr 2021 04:20 )   PT: 13.50 sec;   INR: 1.17 ratio         PTT - ( 22 Apr 2021 20:58 )  PTT:46.8 sec                                                                                     LIVER FUNCTIONS - ( 23 Apr 2021 04:20 )  Alb: 2.6 g/dL / Pro: 5.2 g/dL / ALK PHOS: 167 U/L / ALT: <5 U/L / AST: 11 U/L / GGT: x                                                                                               Mode: CPAP with PS  FiO2: 30  PEEP: 5  PS: 8                                      ABG - ( 23 Apr 2021 03:26 )  pH, Arterial: 7.47  pH, Blood: x     /  pCO2: 44    /  pO2: 131   / HCO3: 32    / Base Excess: 7.2   /  SaO2: 100                 MEDICATIONS  (STANDING):  aMIOdarone    Tablet 200 milliGRAM(s) Oral daily  aspirin  chewable 81 milliGRAM(s) Oral daily  atorvastatin 40 milliGRAM(s) Oral at bedtime  BACItracin   Ointment 1 Application(s) Topical every 12 hours  BACItracin   Ointment 1 Application(s) Topical every 12 hours  chlorhexidine 0.12% Liquid 15 milliLiter(s) Oral Mucosa every 12 hours  collagenase Ointment 1 Application(s) Topical every 12 hours  dextrose 40% Gel 15 Gram(s) Oral once  dextrose 5%. 1000 milliLiter(s) (50 mL/Hr) IV Continuous <Continuous>  dextrose 5%. 1000 milliLiter(s) (100 mL/Hr) IV Continuous <Continuous>  dextrose 50% Injectable 25 Gram(s) IV Push once  dextrose 50% Injectable 12.5 Gram(s) IV Push once  dextrose 50% Injectable 25 Gram(s) IV Push once  doxercalciferol Injectable 2 MICROGram(s) IV Push <User Schedule>  epoetin mana-epbx (RETACRIT) Injectable 31401 Unit(s) IV Push <User Schedule>  estrogens  Cream 1 Gram(s) Vaginal at bedtime  glucagon  Injectable 1 milliGRAM(s) IntraMuscular once  insulin lispro (ADMELOG) corrective regimen sliding scale   SubCutaneous Before meals and at bedtime  lactulose Syrup 15 Gram(s) Oral three times a day  levETIRAcetam  IVPB 375 milliGRAM(s) IV Intermittent every 12 hours  levETIRAcetam  IVPB 250 milliGRAM(s) IV Intermittent <User Schedule>  midodrine 10 milliGRAM(s) Oral every 8 hours  pantoprazole  Injectable 40 milliGRAM(s) IV Push daily  polyethylene glycol 3350 17 Gram(s) Oral at bedtime  senna 2 Tablet(s) Oral at bedtime  sertraline 50 milliGRAM(s) Oral daily  valproic  acid Syrup 250 milliGRAM(s) Oral two times a day    MEDICATIONS  (PRN):  acetaminophen   Tablet .. 650 milliGRAM(s) Oral every 6 hours PRN Temp greater or equal to 38C (100.4F)  HYDROmorphone  Injectable 0.5 milliGRAM(s) IV Push every 4 hours PRN Severe Pain (7 - 10)      New X-rays reviewed:                                                                                  ECHO    CXR interpreted by me:

## 2021-04-23 NOTE — PROGRESS NOTE ADULT - ASSESSMENT
1. Cardiac Arrest: secondary to VFIB in nature, continue supportive care, monitor for now. Monitor for now.    2. Acute Hypoxic Respiratory Failure: secondary to septic/ cardiogenic shock, PNA, continue on lung protective mechanical ventilation for now, will wean FIO2 for sats greater then 92%,  tx nebs, pulmonary toilet, continue supportive care monitor for now. s/p trach, continue supportive care, monitor for now.     3. Mixed Cardiogenic/Septic Shock: secondary to PNA, will continue empiric abx as per ID, off Jose D-Synephrine gtt will, continue monitor for now. tx midodrine    4. Acute Biventricular CHF EF 38% in setting of Severe Aortic stenosis and Mod-Severe MR: continue supportive care s/p BiVICD by EP, monitor for now.    5. PNA: s/p course of abx as per ID, continue pulmonary toilet tx nebs, monitor for now,    6. VFIB: tx amio daily, most likley ischemic in nature. F/u Cardiology recs.    7. B/l Rib Fractures: from fall continue pain control.    8. Right Humeral Fracture: continue supportive care as per Ortho, monitor for now.    9. Right Intertrochanteric Fracture: continue supportive care as per Ortho, s/p Intramedullary rodding of fracture of right femur   monitor for now.     10. Odontoid Fracture: continue nec collar as per Neurology f/u Recs.    11. Seizures: tx AED's as per Neurology monitor for now.    12. Anemia: secondary to hip fracture and AOC transfuse as needed for less then 7, continue supportive care, monitor for now.    13. Sacral Decubitus/ Abscess s/p I/D, f/u surgery recs.    14. CAD s/p CABG: tx ASA, continue supportive care, monitor for now.     15. Bladder Prolapse with Chronic Castellon: continue Castellon monitor for now.    16. DM: tx insulin sliding scale, monitor BSG's, continue supportive care.    17. ESRD on HD: continue renal replacement therapy as per nephrology, monitor for now.    18. DVT/ GI px: tx PPI/ hep gtt.     19. NUT: tx TF's.    20. Afib: tx amio oral, continue supportive care, monitor for now.    21. Left upper ext DVT: tx hep gtt continue supportive care, monitor for now. after peg can start coumadin    22. HTN: Pt is hypotensive tx midodrine continue supportive care, monitor for now.     awaiting peg tube today    Discussed case with  family

## 2021-04-23 NOTE — PROGRESS NOTE ADULT - SUBJECTIVE AND OBJECTIVE BOX
GUS WILBURN 70y Female  MRN#: [id number]       SUBJECTIVE  Patient is a 70y old Female who presents with a chief complaint of Cardiac arrest (23 Apr 2021 12:22)      ***    Today is hospital day 22d, and this morning she is _.    No acute overnight events.     OBJECTIVE  PAST MEDICAL & SURGICAL HISTORY  Diabetes    Congestive heart failure (CHF)    Pleural effusion due to congestive heart failure    End stage renal disease  on dialysis Mon, Wed, Fr    Uterine prolapse    Chronic indwelling Montejo catheter    History of repair of hip fracture  2019    S/P CABG (coronary artery bypass graft)  11/2019    History of thoracentesis    Chronic indwelling Montejo catheter      ALLERGIES:  No Known Allergies    MEDICATIONS:  STANDING MEDICATIONS  aMIOdarone    Tablet 200 milliGRAM(s) Oral daily  aspirin  chewable 81 milliGRAM(s) Oral daily  atorvastatin 40 milliGRAM(s) Oral at bedtime  BACItracin   Ointment 1 Application(s) Topical every 12 hours  BACItracin   Ointment 1 Application(s) Topical every 12 hours  chlorhexidine 0.12% Liquid 15 milliLiter(s) Oral Mucosa every 12 hours  collagenase Ointment 1 Application(s) Topical every 12 hours  dextrose 40% Gel 15 Gram(s) Oral once  dextrose 5%. 1000 milliLiter(s) IV Continuous <Continuous>  dextrose 5%. 1000 milliLiter(s) IV Continuous <Continuous>  dextrose 50% Injectable 25 Gram(s) IV Push once  dextrose 50% Injectable 12.5 Gram(s) IV Push once  dextrose 50% Injectable 25 Gram(s) IV Push once  doxercalciferol Injectable 2 MICROGram(s) IV Push <User Schedule>  epoetin mana-epbx (RETACRIT) Injectable 30466 Unit(s) IV Push <User Schedule>  estrogens  Cream 1 Gram(s) Vaginal at bedtime  glucagon  Injectable 1 milliGRAM(s) IntraMuscular once  insulin lispro (ADMELOG) corrective regimen sliding scale   SubCutaneous Before meals and at bedtime  lactulose Syrup 15 Gram(s) Oral three times a day  levETIRAcetam  IVPB 375 milliGRAM(s) IV Intermittent every 12 hours  levETIRAcetam  IVPB 250 milliGRAM(s) IV Intermittent <User Schedule>  midodrine 10 milliGRAM(s) Oral every 8 hours  pantoprazole  Injectable 40 milliGRAM(s) IV Push daily  polyethylene glycol 3350 17 Gram(s) Oral at bedtime  senna 2 Tablet(s) Oral at bedtime  sertraline 50 milliGRAM(s) Oral daily  valproic  acid Syrup 250 milliGRAM(s) Oral two times a day    PRN MEDICATIONS  acetaminophen   Tablet .. 650 milliGRAM(s) Oral every 6 hours PRN  HYDROmorphone  Injectable 0.5 milliGRAM(s) IV Push every 4 hours PRN      VITAL SIGNS: Last 24 Hours  T(C): 36.7 (23 Apr 2021 08:00), Max: 36.7 (23 Apr 2021 08:00)  T(F): 98 (23 Apr 2021 08:00), Max: 98 (23 Apr 2021 08:00)  HR: 90 (23 Apr 2021 10:00) (84 - 106)  BP: 127/59 (23 Apr 2021 10:00) (109/54 - 140/66)  BP(mean): 88 (23 Apr 2021 10:00) (76 - 91)  RR: 24 (23 Apr 2021 10:00) (14 - 26)  SpO2: 100% (23 Apr 2021 10:00) (95% - 100%)    LABS:                        7.9    17.17 )-----------( 321      ( 23 Apr 2021 04:20 )             25.5     04-23    137  |  97<L>  |  38<H>  ----------------------------<  146<H>  3.7   |  29  |  2.7<H>    Ca    8.3<L>      23 Apr 2021 04:20  Phos  2.2     04-23  Mg     2.1     04-23    TPro  5.2<L>  /  Alb  2.6<L>  /  TBili  0.5  /  DBili  x   /  AST  11  /  ALT  <5  /  AlkPhos  167<H>  04-23    PT/INR - ( 23 Apr 2021 04:20 )   PT: 13.50 sec;   INR: 1.17 ratio         PTT - ( 22 Apr 2021 20:58 )  PTT:46.8 sec    ABG - ( 23 Apr 2021 03:26 )  pH, Arterial: 7.47  pH, Blood: x     /  pCO2: 44    /  pO2: 131   / HCO3: 32    / Base Excess: 7.2   /  SaO2: 100         RADIOLOGY: reviewed      PHYSICAL EXAM:    GENERAL: NAD  HEENT:  EOMI, conjunctiva and sclera clear, No JVD  PULMONARY: Clear to auscultation bilaterally; No wheeze  CARDIOVASCULAR: Regular rate and rhythm; No murmurs, rubs, or gallops  GASTROINTESTINAL: Soft, Nontender, Nondistended; Bowel sounds present  : uterine prolapse w chronic montejo  MUSCULOSKELETAL:  2+ Peripheral Pulses, No clubbing, cyanosis, or edema  NEUROLOGY: non-focal  SKIN: decub buttock lesion, perirectal abscess, neck and chin ulceration unstageable      ADMISSION SUMMARY  Patient is a 70y old Female who presents with a chief complaint of Cardiac arrest (23 Apr 2021 12:22)   GSU WILBURN 70y Female  MRN#: [id number]       SUBJECTIVE  Patient is a 70y old Female who presents with a chief complaint of Cardiac arrest (23 Apr 2021 12:22)      ***    Today is hospital day 22d, and she is doing well without pain.    No acute overnight events.     OBJECTIVE  PAST MEDICAL & SURGICAL HISTORY  Diabetes    Congestive heart failure (CHF)    Pleural effusion due to congestive heart failure    End stage renal disease  on dialysis Mon, Wed, Fr    Uterine prolapse    Chronic indwelling Montejo catheter    History of repair of hip fracture  2019    S/P CABG (coronary artery bypass graft)  11/2019    History of thoracentesis    Chronic indwelling Montejo catheter      ALLERGIES:  No Known Allergies    MEDICATIONS:  STANDING MEDICATIONS  aMIOdarone    Tablet 200 milliGRAM(s) Oral daily  aspirin  chewable 81 milliGRAM(s) Oral daily  atorvastatin 40 milliGRAM(s) Oral at bedtime  BACItracin   Ointment 1 Application(s) Topical every 12 hours  BACItracin   Ointment 1 Application(s) Topical every 12 hours  chlorhexidine 0.12% Liquid 15 milliLiter(s) Oral Mucosa every 12 hours  collagenase Ointment 1 Application(s) Topical every 12 hours  dextrose 40% Gel 15 Gram(s) Oral once  dextrose 5%. 1000 milliLiter(s) IV Continuous <Continuous>  dextrose 5%. 1000 milliLiter(s) IV Continuous <Continuous>  dextrose 50% Injectable 25 Gram(s) IV Push once  dextrose 50% Injectable 12.5 Gram(s) IV Push once  dextrose 50% Injectable 25 Gram(s) IV Push once  doxercalciferol Injectable 2 MICROGram(s) IV Push <User Schedule>  epoetin mana-epbx (RETACRIT) Injectable 10996 Unit(s) IV Push <User Schedule>  estrogens  Cream 1 Gram(s) Vaginal at bedtime  glucagon  Injectable 1 milliGRAM(s) IntraMuscular once  insulin lispro (ADMELOG) corrective regimen sliding scale   SubCutaneous Before meals and at bedtime  lactulose Syrup 15 Gram(s) Oral three times a day  levETIRAcetam  IVPB 375 milliGRAM(s) IV Intermittent every 12 hours  levETIRAcetam  IVPB 250 milliGRAM(s) IV Intermittent <User Schedule>  midodrine 10 milliGRAM(s) Oral every 8 hours  pantoprazole  Injectable 40 milliGRAM(s) IV Push daily  polyethylene glycol 3350 17 Gram(s) Oral at bedtime  senna 2 Tablet(s) Oral at bedtime  sertraline 50 milliGRAM(s) Oral daily  valproic  acid Syrup 250 milliGRAM(s) Oral two times a day    PRN MEDICATIONS  acetaminophen   Tablet .. 650 milliGRAM(s) Oral every 6 hours PRN  HYDROmorphone  Injectable 0.5 milliGRAM(s) IV Push every 4 hours PRN      VITAL SIGNS: Last 24 Hours  T(C): 36.7 (23 Apr 2021 08:00), Max: 36.7 (23 Apr 2021 08:00)  T(F): 98 (23 Apr 2021 08:00), Max: 98 (23 Apr 2021 08:00)  HR: 90 (23 Apr 2021 10:00) (84 - 106)  BP: 127/59 (23 Apr 2021 10:00) (109/54 - 140/66)  BP(mean): 88 (23 Apr 2021 10:00) (76 - 91)  RR: 24 (23 Apr 2021 10:00) (14 - 26)  SpO2: 100% (23 Apr 2021 10:00) (95% - 100%)    LABS:                        7.9    17.17 )-----------( 321      ( 23 Apr 2021 04:20 )             25.5     04-23    137  |  97<L>  |  38<H>  ----------------------------<  146<H>  3.7   |  29  |  2.7<H>    Ca    8.3<L>      23 Apr 2021 04:20  Phos  2.2     04-23  Mg     2.1     04-23    TPro  5.2<L>  /  Alb  2.6<L>  /  TBili  0.5  /  DBili  x   /  AST  11  /  ALT  <5  /  AlkPhos  167<H>  04-23    PT/INR - ( 23 Apr 2021 04:20 )   PT: 13.50 sec;   INR: 1.17 ratio         PTT - ( 22 Apr 2021 20:58 )  PTT:46.8 sec    ABG - ( 23 Apr 2021 03:26 )  pH, Arterial: 7.47  pH, Blood: x     /  pCO2: 44    /  pO2: 131   / HCO3: 32    / Base Excess: 7.2   /  SaO2: 100         RADIOLOGY: reviewed      PHYSICAL EXAM:    GENERAL: NAD  HEENT:  EOMI, conjunctiva and sclera clear, No JVD  PULMONARY: Clear to auscultation bilaterally; No wheeze  CARDIOVASCULAR: Regular rate and rhythm; No murmurs, rubs, or gallops  GASTROINTESTINAL: Soft, Nontender, Nondistended; Bowel sounds present  : uterine prolapse w chronic montejo  MUSCULOSKELETAL:  2+ Peripheral Pulses, No clubbing, cyanosis, or edema  NEUROLOGY: non-focal  SKIN: decub buttock lesion, perirectal abscess, neck and chin ulceration unstageable      ADMISSION SUMMARY  Patient is a 70y old Female who presents with a chief complaint of Cardiac arrest (23 Apr 2021 12:22)

## 2021-04-23 NOTE — PROGRESS NOTE ADULT - SUBJECTIVE AND OBJECTIVE BOX
[place] NEPHROLOGY FOLLOW UP NOTE  --------------------------------------------------------------------------------  24 hour events/subjective: Patient examined. Trached.    PAST HISTORY  --------------------------------------------------------------------------------  No significant changes to PMH, PSH, FHx, SHx, unless otherwise noted    ALLERGIES & MEDICATIONS  --------------------------------------------------------------------------------  Allergies    No Known Allergies    Standing Inpatient Medications  aMIOdarone    Tablet 200 milliGRAM(s) Oral daily  aspirin  chewable 81 milliGRAM(s) Oral daily  atorvastatin 40 milliGRAM(s) Oral at bedtime  BACItracin   Ointment 1 Application(s) Topical every 12 hours  BACItracin   Ointment 1 Application(s) Topical every 12 hours  chlorhexidine 0.12% Liquid 15 milliLiter(s) Oral Mucosa every 12 hours  collagenase Ointment 1 Application(s) Topical every 12 hours  dextrose 40% Gel 15 Gram(s) Oral once  dextrose 5%. 1000 milliLiter(s) IV Continuous <Continuous>  dextrose 5%. 1000 milliLiter(s) IV Continuous <Continuous>  dextrose 50% Injectable 25 Gram(s) IV Push once  dextrose 50% Injectable 12.5 Gram(s) IV Push once  dextrose 50% Injectable 25 Gram(s) IV Push once  doxercalciferol Injectable 2 MICROGram(s) IV Push <User Schedule>  epoetin mana-epbx (RETACRIT) Injectable 88778 Unit(s) IV Push <User Schedule>  estrogens  Cream 1 Gram(s) Vaginal at bedtime  glucagon  Injectable 1 milliGRAM(s) IntraMuscular once  insulin lispro (ADMELOG) corrective regimen sliding scale   SubCutaneous Before meals and at bedtime  lactulose Syrup 15 Gram(s) Oral three times a day  levETIRAcetam  IVPB 375 milliGRAM(s) IV Intermittent every 12 hours  levETIRAcetam  IVPB 250 milliGRAM(s) IV Intermittent <User Schedule>  midodrine 10 milliGRAM(s) Oral every 8 hours  pantoprazole  Injectable 40 milliGRAM(s) IV Push daily  polyethylene glycol 3350 17 Gram(s) Oral at bedtime  senna 2 Tablet(s) Oral at bedtime  sertraline 50 milliGRAM(s) Oral daily  valproic  acid Syrup 250 milliGRAM(s) Oral two times a day    PRN Inpatient Medications  acetaminophen   Tablet .. 650 milliGRAM(s) Oral every 6 hours PRN  HYDROmorphone  Injectable 0.5 milliGRAM(s) IV Push every 4 hours PRN    VITALS/PHYSICAL EXAM  --------------------------------------------------------------------------------  T(C): 36.7 (04-23-21 @ 08:00), Max: 36.7 (04-23-21 @ 08:00)  HR: 90 (04-23-21 @ 10:00) (84 - 106)  BP: 127/59 (04-23-21 @ 10:00) (109/54 - 140/66)  RR: 24 (04-23-21 @ 10:00) (14 - 26)  SpO2: 100% (04-23-21 @ 10:00) (95% - 100%)  Height (cm): 154.9 (04-22-21 @ 17:50)  Weight (kg): 68.8 (04-23-21 @ 02:39)  BMI (kg/m2): 28.7 (04-23-21 @ 02:39)  BSA (m2): 1.68 (04-23-21 @ 02:39)    04-22-21 @ 07:01  -  04-23-21 @ 07:00  --------------------------------------------------------  IN: 1181.5 mL / OUT: 2015 mL / NET: -833.5 mL    Physical Exam:  	Gen: trached  	Pulm: CTA B/L  	CV: RRR, S1S2  	Abd: +BS, soft, nontender/nondistended  	: Ravinder  	LE: Warm,  edema  	Vascular access: TDC    LABS/STUDIES  --------------------------------------------------------------------------------              7.9    17.17 >-----------<  321      [04-23-21 @ 04:20]              25.5     137  |  97  |  38  ----------------------------<  146      [04-23-21 @ 04:20]  3.7   |  29  |  2.7        Ca     8.3     [04-23-21 @ 04:20]      Mg     2.1     [04-23-21 @ 04:20]      Phos  2.2     [04-23-21 @ 04:20]    TPro  5.2  /  Alb  2.6  /  TBili  0.5  /  DBili  x   /  AST  11  /  ALT  <5  /  AlkPhos  167  [04-23-21 @ 04:20]    PT/INR: PT 13.50, INR 1.17       [04-23-21 @ 04:20]  PTT: 46.8       [04-22-21 @ 20:58]      Creatinine Trend:  SCr 2.7 [04-23 @ 04:20]  SCr 3.3 [04-22 @ 04:10]  SCr 2.8 [04-21 @ 04:40]  SCr 3.8 [04-20 @ 04:50]  SCr 3.4 [04-19 @ 05:25]    Urinalysis - [04-02-21 @ 10:44]      Color Yellow / Appearance Turbid / SG 1.027 / pH 7.0      Gluc Negative / Ketone Negative  / Bili Negative / Urobili <2 mg/dL       Blood Moderate / Protein 100 mg/dL / Leuk Est Large / Nitrite Negative      RBC 14 / WBC >720 / Hyaline 16 / Gran  / Sq Epi  / Non Sq Epi 3 / Bacteria Moderate      Iron 55, TIBC 167, %sat 33      [05-27-20 @ 13:22]  Ferritin 636      [05-27-20 @ 13:22]  HbA1c 5.7      [11-09-19 @ 01:20]  TSH 1.71      [04-06-21 @ 04:40]

## 2021-04-23 NOTE — PRE-OP CHECKLIST - 1.
Unable to get BP in left arm due to fx, legs so MD placed order to take BP on left arm with fistula q 2hrs.
CAD, CABG, CHF, AS, ESRD ON HD M/W/F, LT AVF, BLADDER PROLAPSE, CHRONIC FOLET, DMII

## 2021-04-23 NOTE — PROGRESS NOTE ADULT - REASON FOR ADMISSION
Cardiac arrest
Fall
Resp failure
s/p mechanical fall
Mechanical fall
cardiac arrest
hip fracture
right hip
S/p mechanical fall
cardiac arrest
fall/syncope with multiple fractures
syncope and fracture
Mechanical fall
AMS

## 2021-04-23 NOTE — PACU DISCHARGE NOTE - COMMENTS
transported to ccu with ekg and spo2 monitored placed on ventilator 350/20/5/30%  129/68 77 99%
70F s/p BIV ICD placement. She arrived intubated and received GA. She received 500 cc crystalloid. She was hemodynamically stable throughout procedure.     Vitals  /61  HR 81  RR 12  T 36.2C    Vent settings  Vt 350 mL RR 12 PEEP 5 FiO2 30%

## 2021-04-23 NOTE — PROGRESS NOTE ADULT - ATTENDING COMMENTS
Pt. seen/ examined, discussed w/ CCU team  Labs/ vitals reviewed  Prevena functional  Mental status improving, follows some commands, RLE NVID as tested  Will follow

## 2021-04-23 NOTE — BRIEF OPERATIVE NOTE - NSICDXBRIEFPROCEDURE_GEN_ALL_CORE_FT
PROCEDURES:  Implantation of biventricular defibrillator 12-Apr-2021 16:40:03  Alexander Sam  
PROCEDURES:  Gastrostomy, open 23-Apr-2021 16:40:00 Open G tube Marielos Ac  
PROCEDURES:  Creation, tracheostomy, planned, adult 21-Apr-2021 14:03:34  Kate Collins  
PROCEDURES:  Intramedullary rodding of fracture of right femur 15-Apr-2021 22:20:13  Eduardo Gilliam

## 2021-04-23 NOTE — PROGRESS NOTE ADULT - SUBJECTIVE AND OBJECTIVE BOX
ORTHO PROGRESS NOTE    Interval History:  POD8 s/p R femur IMN   Patient seen and examined at bedside.  Following basic commands  Distal aquacel dressing with mild serous drainage, new aquacel applied  Prevena functioning.    Vital Signs Last 24 Hrs  T(C): 37.6 (18 Apr 2021 04:00), Max: 37.7 (17 Apr 2021 20:00)  T(F): 99.6 (18 Apr 2021 04:00), Max: 99.9 (17 Apr 2021 20:00)  HR: 84 (18 Apr 2021 06:00) (82 - 106)  RR: 12 (18 Apr 2021 06:00) (12 - 23)  SpO2: 100% (18 Apr 2021 06:00) (97% - 100%)    Physical Exam:   Dressing C/D/I, serous drainage at distal aquacel dressing, new aquacel dressing applied  Prevena functioning  Following basic commands, able to wiggle toes and sensation grossly intact in LEs  CR<2sec, palpable pulses                   8.4    11.62 )-----------( 303      ( 18 Apr 2021 04:30 )             26.7     142  |  102  |  39<H>  ----------------------------<  238<H>  3.8   |  25  |  2.9<H>    Ca    8.7      18 Apr 2021 04:30  Phos  3.2     04-18  Mg     1.9     04-18  TPro  5.1<L>  /  Alb  2.5<L>  /  TBili  0.3  /  DBili  x   /  AST  14  /  ALT  <5  /  AlkPhos  143<H>  04-18  PT/INR - ( 18 Apr 2021 04:30 )   PT: 29.20 sec;   INR: 2.54 ratio    PTT - ( 18 Apr 2021 04:30 )  PTT:32.1 sec    A/P: 70F POD #8 s/p right hip ORIF.    WBAT on RLE  Continue to monitor dressings and change as needed  NWB RUE (proximal humerus fracture)  DVT ppx: SCD, ASA ordered.  Pain control  Home medications: resume  Continue trend labs: Transfuse PRN  Rest of management per primary  Dispo: Pending PT recommendations

## 2021-04-23 NOTE — BRIEF OPERATIVE NOTE - OPERATION/FINDINGS
GOOD LEAD PROPERTIES
4 PART PROXIMAL FEMUR FRACTURE  UNSTABLE FRACTURE PATTERN NOTED
creation of creation,   8 Arlin MONTEIROT
Upper midline incision made. Stomach identified and mobilized  Gastrostomy done. Gtube inserted.  2 Purse sutures done  Stomach fixed to ant abd wall

## 2021-04-23 NOTE — PROGRESS NOTE ADULT - ASSESSMENT
1. Cardiac Arrest: secondary to VFIB in nature, continue supportive care, monitor for now. Monitor for now. Biventricular AICD in place. Vfib also on 200mg amiodarone daily    2. Acute Hypoxic Respiratory Failure: secondary to septic/ cardiogenic shock, PNA, continue on lung protective mechanical ventilation for now, will wean FIO2 for sats greater then 92%,  tx nebs, pulmonary toilet, continue supportive care monitor for now. awaiting tracheostomy, f/u surgery regarding timing    3. Mixed Cardiogenic/Septic Shock: secondary to PNA, will continue empiric abx as per ID, off Neosynephrine gtt will, continue monitor for now. s/p course of abx as per ID, continue pulmonary toilet tx nebs, monitor for now,    4. Right Intertrochanteric Fracture: continue supportive care as per Ortho, s/p Intramedullary rodding of fracture of right femur   monitor for now.     5. Right Humeral Fracture: continue supportive care as per Ortho, monitor for now.    6. B/l Rib Fractures: from fall continue pain control.    7. Odontoid Fracture: continue nec collar 8 week course    8. Seizures: on keppra     9. Anemia: secondary to hip fracture and AOC transfuse as needed for less then 7, continue supportive care, monitor for now.    10. Sacral Decubitus/ Abscess s/p I/D, f/u surgery recs.    11. CAD s/p CABG: tx ASA, continue supportive care, monitor for now.     12. uterine prolapse with Chronic Castellon: continue Castellon monitor for now.    13. DM: tx insulin sliding scale, monitor BSG's, continue supportive care.    14. ESRD on HD: continue renal replacement therapy as per nephrology, monitor for now.    15. DVT/ GI px: tx PPI/ hep gtt.     16Left upper ext DVT: tx hep gtt continue supportive care, monitor for now. restart on heparin 24 hours s/p trach and peg which is approx 3pm today     1. Cardiac Arrest: secondary to VFIB in nature, continue supportive care, monitor for now. Monitor for now. Biventricular AICD in place. Vfib also on 200mg amiodarone daily    2. Acute Hypoxic Respiratory Failure: secondary to septic/ cardiogenic shock, PNA, continue on lung protective mechanical ventilation for now, will wean FIO2 for sats greater then 92%,  tx nebs, pulmonary toilet, continue supportive care monitor for now. awaiting tracheostomy, f/u surgery regarding timing    3. Mixed Cardiogenic/Septic Shock: secondary to PNA, will continue empiric abx as per ID, off Neosynephrine gtt will, continue monitor for now. s/p course of abx as per ID, continue pulmonary toilet tx nebs, monitor for now,    4. Right Intertrochanteric Fracture: continue supportive care as per Ortho, s/p Intramedullary rodding of fracture of right femur   monitor for now.     5. Right Humeral Fracture: continue supportive care as per Ortho, monitor for now.    6. B/l Rib Fractures: from fall continue pain control.    7. Odontoid Fracture: continue nec collar 8 week course    8. Seizures: on keppra and valproaic acid, called neuro to see if she will need to cont it long term - they will get back to us    9. Anemia: secondary to hip fracture and AOC transfuse as needed for less then 7, continue supportive care, monitor for now.    10. Sacral Decubitus/ Abscess s/p I/D, f/u surgery recs.    11. CAD s/p CABG: tx ASA, continue supportive care, monitor for now.     12. uterine prolapse with Chronic Castellon: continue Castellon monitor for now.    13. DM: tx insulin sliding scale, monitor BSG's, continue supportive care.    14. ESRD on HD: continue renal replacement therapy as per nephrology, monitor for now.    15. DVT/ GI px: tx PPI/ hep gtt.     16Left upper ext DVT: tx hep gtt continue supportive care, monitor for now. restart on heparin 24 hours s/p trach and peg which is approx 3pm today     1. Cardiac Arrest: secondary to VFIB in nature, continue supportive care, monitor for now. Monitor for now. Biventricular AICD in place. Vfib also on 200mg amiodarone daily    2. Acute Hypoxic Respiratory Failure: secondary to septic/ cardiogenic shock, PNA, continue on lung protective mechanical ventilation for now, will wean FIO2 for sats greater then 92%,  tx nebs, pulmonary toilet, continue supportive care monitor for now. awaiting tracheostomy, f/u surgery regarding timing    3. Mixed Cardiogenic/Septic Shock: secondary to PNA, will continue empiric abx as per ID, off Neosynephrine gtt will, continue monitor for now. s/p course of abx as per ID, continue pulmonary toilet tx nebs, monitor for now,    4. Right Intertrochanteric Fracture: continue supportive care as per Ortho, s/p Intramedullary rodding of fracture of right femur   monitor for now.     5. Right Humeral Fracture: continue supportive care as per Ortho, monitor for now.    6. B/l Rib Fractures: from fall continue pain control.    7. Odontoid Fracture: continue nec collar 8 week course    8. Seizures: on keppra and valproaic acid; spoke with neurology who recommended that the patient remains on and get discharged with these medications until a neurology followup at the [facility] location that she is going to.    9. Anemia: secondary to hip fracture and AOC transfuse as needed for less then 7, continue supportive care, monitor for now.    10. Sacral Decubitus/ Abscess s/p I/D, f/u surgery recs.    11. CAD s/p CABG: tx ASA, continue supportive care, monitor for now.     12. uterine prolapse with Chronic Castellon: continue Castellon monitor for now.    13. DM: tx insulin sliding scale, monitor BSG's, continue supportive care.    14. ESRD on HD: continue renal replacement therapy as per nephrology, monitor for now.    15. DVT/ GI px: tx PPI/ hep gtt.     16Left upper ext DVT: tx hep gtt continue supportive care, monitor for now. restart on heparin 24 hours s/p trach and peg which is approx 3pm today

## 2021-04-23 NOTE — PROGRESS NOTE ADULT - ASSESSMENT
SUGGEST:  - when permitted to use GT, resume Peptamen AF at 25 ml/h  - if tolerated after 4 h resume full goal rate of 45 ml/h and resume Chris twice a day  - will f/u and transition to gravity feeds, and thereafter to polymeric formula

## 2021-04-23 NOTE — PROGRESS NOTE ADULT - SUBJECTIVE AND OBJECTIVE BOX
Progress Note: General Surgery  Patient: GUS WILBURN , 70y (1950)Female   MRN: [id number]  Location: [address]  Visit: 04-01-21 Inpatient  Date: 04-23-21 @ 00:49    Admit Diagnosis/Chief Complaint: Personal history of sudden cardiac death (SCD) resuscitated    Cardiomyopathy    Afib    Hip fracture, right    Respiratory failure        Procedure/Diagnosis: Afib    Cardiomyopathy    Personal history of sudden cardiac death (SCD) resuscitated    Hip fracture, right    Respiratory failure     S/P Implantation of biventricular defibrillator    Intramedullary rodding of fracture of right femur    Creation, tracheostomy, planned, adult        Events/ 24h: Patient seen and examined at bedside. No acute events overnight. Afebrile, VSS.    Vitals: T(F): 97.7 (04-22-21 @ 20:00), Max: 97.9 (04-22-21 @ 16:00)  HR: 84 (04-23-21 @ 00:00)  BP: 134/58 (04-23-21 @ 00:00) (109/54 - 139/51)  RR: 16 (04-23-21 @ 00:00)  SpO2: 100% (04-23-21 @ 00:00)  FiO2: 30, PEEP: 5  In:   04-21-21 @ 07:01  -  04-22-21 @ 07:00  --------------------------------------------------------  IN: 1120 mL    04-22-21 @ 07:01  -  04-23-21 @ 00:49  --------------------------------------------------------  IN: 1014.5 mL      Out:   04-21-21 @ 07:01  -  04-22-21 @ 07:00  --------------------------------------------------------  OUT:    Heparin: 0 mL    Indwelling Catheter - Urethral (mL): 160 mL  Total OUT: 160 mL      04-22-21 @ 07:01  -  04-23-21 @ 00:49  --------------------------------------------------------  OUT:    Indwelling Catheter - Urethral (mL): 10 mL    Other (mL): 2000 mL  Total OUT: 2010 mL        Net:   04-21-21 @ 07:01  -  04-22-21 @ 07:00  --------------------------------------------------------  NET: 960 mL    04-22-21 @ 07:01  -  04-23-21 @ 00:49  --------------------------------------------------------  NET: -995.5 mL        Diet: Diet, NPO after Midnight:      NPO Start Date: 22-Apr-2021,   NPO Start Time: 23:59 (04-22-21 @ 19:27)  Diet, NPO with Tube Feed:   Tube Feeding Modality: Orogastric  Peptamen A.F. Formula  Total Volume for 24 Hours (mL): 1080  Continuous  Until Goal Tube Feed Rate (mL per Hour): 45  Tube Feed Duration (in Hours): 24  Tube Feed Start Time: 19:00  Chris(7 Gm Arginine/7 Gm Glut/1.2 Gm HMB     Qty per Day:  2 (04-20-21 @ 09:03)    IV Fluids: dextrose 5%. 1000 milliLiter(s) (50 mL/Hr) IV Continuous <Continuous>  dextrose 5%. 1000 milliLiter(s) (100 mL/Hr) IV Continuous <Continuous>  doxercalciferol Injectable 2 MICROGram(s) IV Push <User Schedule>      Physical Examination:  General Appearance: NAD   HEENT: EOMI, sclera anicteric.  Heart: RRR   Lungs: Symmetric chest wall expansion, equal rise and fall.  Abdomen:  Soft, nontender, nondistended.   MSK/Extremities: Warm & well-perfused.   Skin: Warm, dry. No jaundice.       Medications: [Standing]  aMIOdarone    Tablet 200 milliGRAM(s) Oral daily  aspirin  chewable 81 milliGRAM(s) Oral daily  atorvastatin 40 milliGRAM(s) Oral at bedtime  BACItracin   Ointment 1 Application(s) Topical every 12 hours  BACItracin   Ointment 1 Application(s) Topical every 12 hours  chlorhexidine 0.12% Liquid 15 milliLiter(s) Oral Mucosa every 12 hours  collagenase Ointment 1 Application(s) Topical every 12 hours  dextrose 40% Gel 15 Gram(s) Oral once  dextrose 5%. 1000 milliLiter(s) (50 mL/Hr) IV Continuous <Continuous>  dextrose 5%. 1000 milliLiter(s) (100 mL/Hr) IV Continuous <Continuous>  dextrose 50% Injectable 25 Gram(s) IV Push once  dextrose 50% Injectable 12.5 Gram(s) IV Push once  dextrose 50% Injectable 25 Gram(s) IV Push once  doxercalciferol Injectable 2 MICROGram(s) IV Push <User Schedule>  epoetin mana-epbx (RETACRIT) Injectable 04802 Unit(s) IV Push <User Schedule>  estrogens  Cream 1 Gram(s) Vaginal at bedtime  glucagon  Injectable 1 milliGRAM(s) IntraMuscular once  heparin  Infusion 1300 Unit(s)/Hr (13 mL/Hr) IV Continuous <Continuous>  insulin lispro (ADMELOG) corrective regimen sliding scale   SubCutaneous Before meals and at bedtime  lactulose Syrup 15 Gram(s) Oral three times a day  levETIRAcetam  IVPB 375 milliGRAM(s) IV Intermittent every 12 hours  levETIRAcetam  IVPB 250 milliGRAM(s) IV Intermittent <User Schedule>  midodrine 10 milliGRAM(s) Oral every 8 hours  pantoprazole  Injectable 40 milliGRAM(s) IV Push daily  polyethylene glycol 3350 17 Gram(s) Oral at bedtime  senna 2 Tablet(s) Oral at bedtime  sertraline 50 milliGRAM(s) Oral daily  valproic  acid Syrup 250 milliGRAM(s) Oral two times a day    DVT Prophylaxis: heparin  Infusion 1300 Unit(s)/Hr IV Continuous <Continuous>    GI Prophylaxis: pantoprazole  Injectable 40 milliGRAM(s) IV Push daily    Antibiotics:   Anticoagulation:   Medications:[PRN]  acetaminophen   Tablet .. 650 milliGRAM(s) Oral every 6 hours PRN  HYDROmorphone  Injectable 0.5 milliGRAM(s) IV Push every 4 hours PRN      Labs:                        7.9    17.85 )-----------( 334      ( 23 Apr 2021 00:03 )             25.4     04-22    136  |  98  |  56<H>  ----------------------------<  205<H>  4.3   |  27  |  3.3<H>    Ca    8.5      22 Apr 2021 04:10  Phos  2.4     04-22  Mg     2.2     04-22    TPro  5.2<L>  /  Alb  2.5<L>  /  TBili  0.4  /  DBili  x   /  AST  10  /  ALT  <5  /  AlkPhos  154<H>  04-22    LIVER FUNCTIONS - ( 22 Apr 2021 04:10 )  Alb: 2.5 g/dL / Pro: 5.2 g/dL / ALK PHOS: 154 U/L / ALT: <5 U/L / AST: 10 U/L / GGT: x           PT/INR - ( 22 Apr 2021 20:58 )   PT: 13.50 sec;   INR: 1.17 ratio         PTT - ( 22 Apr 2021 20:58 )  PTT:46.8 sec  ABG - ( 22 Apr 2021 02:19 )  pH: 7.41  /  pCO2: 46    /  pO2: 128   / HCO3: 30    / Base Excess: 4.3   /  SaO2: 99                      Urine/Micro:        Imaging:

## 2021-04-23 NOTE — PROGRESS NOTE ADULT - SUBJECTIVE AND OBJECTIVE BOX
pt remains vent dependent, + trach, + awake  neck collar in place  NG feeding tube in place  plan surgical GT placement later today negative  I&O negative  feeds off in prep for surgery, no IV fluid running  Vital Signs Last 24 Hrs  T(C): 36.7 (23 Apr 2021 16:30), Max: 36.8 (23 Apr 2021 12:00)  T(F): 98 (23 Apr 2021 16:30), Max: 98.2 (23 Apr 2021 12:00)  HR: 86 (23 Apr 2021 18:00) (78 - 98)  BP: 150/60 (23 Apr 2021 18:00) (109/54 - 150/60)  BP(mean): 89 (23 Apr 2021 18:00) (76 - 92)  RR: 74 (23 Apr 2021 18:00) (14 - 90)  SpO2: 99% (23 Apr 2021 18:00) (95% - 100%)                        7.9    17.17 )-----------( 321      ( 23 Apr 2021 04:20 )             25.5   04-23    137  |  97<L>  |  38<H>  ----------------------------<  146<H>  3.7   |  29  |  2.7<H>    Ca    8.3<L>      23 Apr 2021 04:20  Phos  2.2     04-23  Mg     2.1     04-23    TPro  5.2<L>  /  Alb  2.6<L>  /  TBili  0.5  /  DBili  x   /  AST  11  /  ALT  <5  /  AlkPhos  167<H>  04-23  ABG - ( 23 Apr 2021 03:26 )  pH, Arterial: 7.47  pH, Blood: x     /  pCO2: 44    /  pO2: 131   / HCO3: 32    / Base Excess: 7.2   /  SaO2: 100

## 2021-04-24 NOTE — PROGRESS NOTE ADULT - SUBJECTIVE AND OBJECTIVE BOX
[place] [facility] FOLLOW UP NOTE  --------------------------------------------------------------------------------  24 hour events/subjective: Patient examined during HD. Trached.    PAST HISTORY  --------------------------------------------------------------------------------  No significant changes to PMH, PSH, FHx, SHx, unless otherwise noted    ALLERGIES & MEDICATIONS  --------------------------------------------------------------------------------  Allergies    No Known Allergies    Standing Inpatient Medications  aMIOdarone    Tablet 200 milliGRAM(s) Oral daily  aspirin  chewable 81 milliGRAM(s) Oral daily  atorvastatin 40 milliGRAM(s) Oral at bedtime  BACItracin   Ointment 1 Application(s) Topical every 12 hours  BACItracin   Ointment 1 Application(s) Topical every 12 hours  chlorhexidine 0.12% Liquid 15 milliLiter(s) Oral Mucosa every 12 hours  collagenase Ointment 1 Application(s) Topical every 12 hours  dextrose 40% Gel 15 Gram(s) Oral once  dextrose 5%. 1000 milliLiter(s) IV Continuous <Continuous>  dextrose 5%. 1000 milliLiter(s) IV Continuous <Continuous>  dextrose 50% Injectable 25 Gram(s) IV Push once  dextrose 50% Injectable 12.5 Gram(s) IV Push once  dextrose 50% Injectable 25 Gram(s) IV Push once  doxercalciferol Injectable 2 MICROGram(s) IV Push <User Schedule>  epoetin mana-epbx (RETACRIT) Injectable 98571 Unit(s) IV Push <User Schedule>  estrogens  Cream 1 Gram(s) Vaginal at bedtime  glucagon  Injectable 1 milliGRAM(s) IntraMuscular once  insulin lispro (ADMELOG) corrective regimen sliding scale   SubCutaneous Before meals and at bedtime  lactulose Syrup 15 Gram(s) Oral three times a day  levETIRAcetam  IVPB 375 milliGRAM(s) IV Intermittent every 12 hours  levETIRAcetam  IVPB 250 milliGRAM(s) IV Intermittent <User Schedule>  metoprolol tartrate Injectable 5 milliGRAM(s) IV Push once  midodrine 5 milliGRAM(s) Oral every 8 hours  pantoprazole  Injectable 40 milliGRAM(s) IV Push daily  polyethylene glycol 3350 17 Gram(s) Oral at bedtime  senna 2 Tablet(s) Oral at bedtime  sertraline 50 milliGRAM(s) Oral daily  valproic  acid Syrup 250 milliGRAM(s) Oral two times a day    PRN Inpatient Medications  acetaminophen   Tablet .. 650 milliGRAM(s) Oral every 6 hours PRN  HYDROmorphone  Injectable 0.5 milliGRAM(s) IV Push every 4 hours PRN    VITALS/PHYSICAL EXAM  --------------------------------------------------------------------------------  T(C): 36.6 (04-24-21 @ 08:00), Max: 37 (04-24-21 @ 04:00)  HR: 114 (04-24-21 @ 10:35) (72 - 114)  BP: 118/79 (04-24-21 @ 10:35) (112/56 - 163/75)  RR: 30 (04-24-21 @ 10:35) (12 - 90)  SpO2: 100% (04-24-21 @ 10:35) (99% - 100%)  Height (cm): 154.9 (04-22-21 @ 17:50)  Weight (kg): 68.8 (04-23-21 @ 02:39)  BMI (kg/m2): 28.7 (04-23-21 @ 02:39)  BSA (m2): 1.68 (04-23-21 @ 02:39)    04-23-21 @ 07:01  -  04-24-21 @ 07:00  --------------------------------------------------------  IN: 340 mL / OUT: 50 mL / NET: 290 mL    Physical Exam:  	Gen: Trached  	Pulm: CTA B/L  	CV: RRR, S1S2  	Abd: +BS, soft, nontender/nondistended  	: Ravinder  	LE: Warm,  edema  	Vascular access: TDC    LABS/STUDIES  --------------------------------------------------------------------------------              7.9    15.48 >-----------<  308      [04-24-21 @ 04:30]              26.3     139  |  98  |  43  ----------------------------<  111      [04-24-21 @ 04:30]  4.3   |  28  |  3.1        Ca     8.5     [04-24-21 @ 04:30]      Mg     2.2     [04-24-21 @ 04:30]      Phos  3.3     [04-24-21 @ 04:30]    TPro  5.1  /  Alb  2.6  /  TBili  0.3  /  DBili  x   /  AST  20  /  ALT  <5  /  AlkPhos  151  [04-24-21 @ 04:30]    PT/INR: PT 13.50, INR 1.17       [04-23-21 @ 04:20]  PTT: 30.6       [04-24-21 @ 04:30]    Creatinine Trend:  SCr 3.1 [04-24 @ 04:30]  SCr 2.7 [04-23 @ 04:20]  SCr 3.3 [04-22 @ 04:10]  SCr 2.8 [04-21 @ 04:40]  SCr 3.8 [04-20 @ 04:50]    Urinalysis - [04-02-21 @ 10:44]      Color Yellow / Appearance Turbid / SG 1.027 / pH 7.0      Gluc Negative / Ketone Negative  / Bili Negative / Urobili <2 mg/dL       Blood Moderate / Protein 100 mg/dL / Leuk Est Large / Nitrite Negative      RBC 14 / WBC >720 / Hyaline 16 / Gran  / Sq Epi  / Non Sq Epi 3 / Bacteria Moderate    Iron 55, TIBC 167, %sat 33      [05-27-20 @ 13:22]  Ferritin 636      [05-27-20 @ 13:22]  HbA1c 5.7      [11-09-19 @ 01:20]  TSH 1.71      [04-06-21 @ 04:40]

## 2021-04-24 NOTE — PROGRESS NOTE ADULT - ASSESSMENT
70y Female patient   Patient seen and examined at bedside. Trach in place on vent  Tolerating:  Diet, NPO after Midnight:      NPO Start Date: 22-Apr-2021,   NPO Start Time: 23:59 (04-22-21 @ 19:27)  Diet, NPO with Tube Feed:   Tube Feeding Modality: Orogastric  Peptamen A.F. Formula  Total Volume for 24 Hours (mL): 1080  Continuous  Until Goal Tube Feed Rate (mL per Hour): 45  Tube Feed Duration (in Hours): 24  Tube Feed Start Time: 19:00  Chris(7 Gm Arginine/7 Gm Glut/1.2 Gm HMB     Qty per Day:  2 (04-20-21 @ 09:03)      Plan:  - s/p open G tube 4/23  - Monitor vitals  - Monitor labs and replete as necessary  - Monitor for bowel function  - Continue Pain Medications if necessary            Date/Time: 04-24-21 @ 01:42

## 2021-04-24 NOTE — PROGRESS NOTE ADULT - ASSESSMENT
1. Cardiac Arrest: secondary to VFIB in nature, continue supportive care, monitor for now. Monitor for now.    2. Acute Hypoxic Respiratory Failure: secondary to septic/ cardiogenic shock, PNA, continue on lung protective mechanical ventilation for now, will wean FIO2 for sats greater then 92%,  tx nebs, pulmonary toilet, continue supportive care monitor for now. s/p trach, continue supportive care, monitor for now. Tolerating PS 12hrs per day.     3. Mixed Cardiogenic/Septic Shock:, resolved secondary to PNA, will continue empiric abx as per ID, off Jose D-Synephrine gtt will, continue monitor for now. Dec midodrine to 5q8h.     4. Acute Biventricular CHF EF 38% in setting of Severe Aortic stenosis and Mod-Severe MR: continue supportive care s/p BiVICD by EP, monitor for now.    5. PNA: s/p course of abx as per ID, continue pulmonary toilet tx nebs, monitor for now,    6. VFIB: tx amio daily, most likley ischemic in nature. F/u Cardiology recs.    7. B/l Rib Fractures: from fall continue pain control.    8. Right Humeral Fracture: continue supportive care as per Ortho, monitor for now.    9. Right Intertrochanteric Fracture: continue supportive care as per Ortho, s/p Intramedullary rodding of fracture of right femur   monitor for now.     10. Odontoid Fracture: continue nec collar as per Neurology f/u Recs.    11. Seizures: tx AED's as per Neurology monitor for now.    12. Anemia: secondary to hip fracture and AOC transfuse as needed for less then 7, continue supportive care, monitor for now.    13. Sacral Decubitus/ Abscess s/p I/D, f/u surgery recs.    14. CAD s/p CABG: tx ASA, continue supportive care, monitor for now.     15. Bladder Prolapse with Chronic Castellon: continue Castellon monitor for now.    16. DM: tx insulin sliding scale, monitor BSG's, continue supportive care.    17. ESRD on HD: continue renal replacement therapy as per nephrology, monitor for now.    18. DVT/ GI px: tx PPI/ hep gtt.     19. NUT: tx TF's.    20. Afib: tx amio oral, continue supportive care, monitor for now.    21. Left upper ext DVT: tx hep gtt continue supportive care, monitor for now. after peg can start coumadin    22. HTN: Pt is hypotensive tx midodrine continue supportive care, monitor for now.     Discussed case with  family    Pending NH placement

## 2021-04-24 NOTE — PROGRESS NOTE ADULT - SUBJECTIVE AND OBJECTIVE BOX
Progress Note: General Surgery  Patient: GUS WILBURN , 70y (1950)Female   MRN: [id number]  Location: [address]  Visit: 04-01-21 Inpatient  Date: 04-24-21 @ 01:42    Admit Diagnosis/Chief Complaint: Personal history of sudden cardiac death (SCD) resuscitated    Cardiomyopathy    Afib    Hip fracture, right    Respiratory failure        Procedure/Diagnosis: Afib    Cardiomyopathy    Personal history of sudden cardiac death (SCD) resuscitated    Hip fracture, right    Respiratory failure     S/P Implantation of biventricular defibrillator    Intramedullary rodding of fracture of right femur    Creation, tracheostomy, planned, adult    Gastrostomy, open      Events/ 24h: No acute events overnight. Pain controlled.    Vitals: T(F): 98.1 (04-24-21 @ 00:00), Max: 98.2 (04-23-21 @ 12:00)  HR: 72 (04-24-21 @ 00:00)  BP: 134/55 (04-24-21 @ 00:00) (112/56 - 159/62)  RR: 124 (04-24-21 @ 00:00)  SpO2: 100% (04-24-21 @ 00:00)  RR (machine): 12, TV (machine): 350, FiO2: 30, PEEP: 5, PIP: 20  In:   04-22-21 @ 07:01  -  04-23-21 @ 07:00  --------------------------------------------------------  IN: 1181.5 mL    04-23-21 @ 07:01  -  04-24-21 @ 01:42  --------------------------------------------------------  IN: 190 mL      Out:   04-22-21 @ 07:01  -  04-23-21 @ 07:00  --------------------------------------------------------  OUT:    Indwelling Catheter - Urethral (mL): 15 mL    Other (mL): 2000 mL  Total OUT: 2015 mL      04-23-21 @ 07:01  -  04-24-21 @ 01:42  --------------------------------------------------------  OUT:    Indwelling Catheter - Urethral (mL): 0 mL    PEG (Percutaneous Endoscopic Gastrostomy) Tube (mL): 30 mL  Total OUT: 30 mL        Net:   04-22-21 @ 07:01  -  04-23-21 @ 07:00  --------------------------------------------------------  NET: -833.5 mL    04-23-21 @ 07:01  -  04-24-21 @ 01:42  --------------------------------------------------------  NET: 160 mL        Diet: Diet, NPO (04-23-21 @ 20:58)    IV Fluids: dextrose 5%. 1000 milliLiter(s) (100 mL/Hr) IV Continuous <Continuous>  dextrose 5%. 1000 milliLiter(s) (50 mL/Hr) IV Continuous <Continuous>  doxercalciferol Injectable 2 MICROGram(s) IV Push <User Schedule>      Physical Examination:  General Appearance: trach in place on vent  HEENT: EOMI, sclera non-icteric.  Heart: RRR   Lungs: CTABL.   Abdomen:  Soft, J tube in place  MSK/Extremities: Warm & well-perfused.   Skin: Warm, dry. No jaundice.       Medications: [Standing]  aMIOdarone    Tablet 200 milliGRAM(s) Oral daily  aspirin  chewable 81 milliGRAM(s) Oral daily  atorvastatin 40 milliGRAM(s) Oral at bedtime  BACItracin   Ointment 1 Application(s) Topical every 12 hours  BACItracin   Ointment 1 Application(s) Topical every 12 hours  cefoTEtan  IVPB 1 Gram(s) IV Intermittent every 12 hours  chlorhexidine 0.12% Liquid 15 milliLiter(s) Oral Mucosa every 12 hours  collagenase Ointment 1 Application(s) Topical every 12 hours  dextrose 40% Gel 15 Gram(s) Oral once  dextrose 5%. 1000 milliLiter(s) (50 mL/Hr) IV Continuous <Continuous>  dextrose 5%. 1000 milliLiter(s) (100 mL/Hr) IV Continuous <Continuous>  dextrose 50% Injectable 25 Gram(s) IV Push once  dextrose 50% Injectable 12.5 Gram(s) IV Push once  dextrose 50% Injectable 25 Gram(s) IV Push once  doxercalciferol Injectable 2 MICROGram(s) IV Push <User Schedule>  epoetin mana-epbx (RETACRIT) Injectable 52719 Unit(s) IV Push <User Schedule>  estrogens  Cream 1 Gram(s) Vaginal at bedtime  glucagon  Injectable 1 milliGRAM(s) IntraMuscular once  insulin lispro (ADMELOG) corrective regimen sliding scale   SubCutaneous Before meals and at bedtime  lactulose Syrup 15 Gram(s) Oral three times a day  levETIRAcetam  IVPB 375 milliGRAM(s) IV Intermittent every 12 hours  levETIRAcetam  IVPB 250 milliGRAM(s) IV Intermittent <User Schedule>  midodrine 10 milliGRAM(s) Oral every 8 hours  pantoprazole  Injectable 40 milliGRAM(s) IV Push daily  polyethylene glycol 3350 17 Gram(s) Oral at bedtime  senna 2 Tablet(s) Oral at bedtime  sertraline 50 milliGRAM(s) Oral daily  valproic  acid Syrup 250 milliGRAM(s) Oral two times a day    DVT Prophylaxis:   GI Prophylaxis: pantoprazole  Injectable 40 milliGRAM(s) IV Push daily    Antibiotics: cefoTEtan  IVPB 1 Gram(s) IV Intermittent every 12 hours    Anticoagulation:   Medications:[PRN]  acetaminophen   Tablet .. 650 milliGRAM(s) Oral every 6 hours PRN  HYDROmorphone  Injectable 0.5 milliGRAM(s) IV Push every 4 hours PRN      Labs:                        7.9    17.17 )-----------( 321      ( 23 Apr 2021 04:20 )             25.5     04-23    137  |  97<L>  |  38<H>  ----------------------------<  146<H>  3.7   |  29  |  2.7<H>    Ca    8.3<L>      23 Apr 2021 04:20  Phos  2.2     04-23  Mg     2.1     04-23    TPro  5.2<L>  /  Alb  2.6<L>  /  TBili  0.5  /  DBili  x   /  AST  11  /  ALT  <5  /  AlkPhos  167<H>  04-23    LIVER FUNCTIONS - ( 23 Apr 2021 04:20 )  Alb: 2.6 g/dL / Pro: 5.2 g/dL / ALK PHOS: 167 U/L / ALT: <5 U/L / AST: 11 U/L / GGT: x           PT/INR - ( 23 Apr 2021 04:20 )   PT: 13.50 sec;   INR: 1.17 ratio         PTT - ( 22 Apr 2021 20:58 )  PTT:46.8 sec  ABG - ( 23 Apr 2021 03:26 )  pH: 7.47  /  pCO2: 44    /  pO2: 131   / HCO3: 32    / Base Excess: 7.2   /  SaO2: 100                     Urine/Micro:        Imaging:   ***  < from: Xray Chest 1 View- PORTABLE-Routine (Xray Chest 1 View- PORTABLE-Routine in AM.) (04.23.21 @ 03:50) >    Impression:    Unchanged left pleural effusion/basilar opacity.  Stable multiple support devices as above.    < end of copied text >

## 2021-04-24 NOTE — PROGRESS NOTE ADULT - SUBJECTIVE AND OBJECTIVE BOX
Patient is a 70y old  Female who presents with a chief complaint of Cardiac arrest (23 Apr 2021 12:22)        Over Night Events:  No overnight events. Afebrile. No pressors. No sedation.       ROS:     All pertinent ROS are negative except HPI         PHYSICAL EXAM    ICU Vital Signs Last 24 Hrs  T(C): 36.6 (24 Apr 2021 08:00), Max: 37 (24 Apr 2021 04:00)  T(F): 97.9 (24 Apr 2021 08:00), Max: 98.6 (24 Apr 2021 04:00)Dec midodrine to 5 q8h.   HR: 89 (24 Apr 2021 08:25) (72 - 94)  BP: 135/68 (24 Apr 2021 08:25) (112/56 - 163/75)  BP(mean): 96 (24 Apr 2021 08:00) (73 - 108)  RR: 19 (24 Apr 2021 08:25) (12 - 90)  SpO2: 100% (24 Apr 2021 08:25) (99% - 100%)      CONSTITUTIONAL:   Ill appearing.      ENT:   Airway patent,   Mouth with normal mucosa.   No thrush    EYES:   Pupils equal,   Round and reactive to light.    CARDIAC:   Irregular  Regular rhythm.    No edema    RESPIRATORY:   S/p trach  Dec bilateral BS  Normal chest expansion  Not tachypneic,  No use of accessory muscles    GASTROINTESTINAL:  PEG tube  Abdomen soft,   Non-tender,   No guarding,   + BS  Last BM- 2 days ago    MUSCULOSKELETAL:   Range of motion is not limited,  No clubbing, cyanosis    NEUROLOGICAL:   Off sedation, follows commands, moves all extremities  Alert and oriented   No motor  deficits.  pertinent DTRs normal    SKIN:   Multiple skin breakdown, wound care nurse following  Skin normal color for race,   Warm and dry  No evidence of rash.    PSYCHIATRIC:   No apparent risk to self or others.      04-23-21 @ 07:01  -  04-24-21 @ 07:00  --------------------------------------------------------  IN:    Enteral Tube Flush: 40 mL    IV PiggyBack: 300 mL  Total IN: 340 mL    OUT:    Indwelling Catheter - Urethral (mL): 0 mL    PEG (Percutaneous Endoscopic Gastrostomy) Tube (mL): 50 mL  Total OUT: 50 mL    Total NET: 290 mL      LABS:                            7.9    15.48 )-----------( 308      ( 24 Apr 2021 04:30 )             26.3                                               04-24    139  |  98  |  43<H>  ----------------------------<  111<H>  4.3   |  28  |  3.1<H>    Ca    8.5      24 Apr 2021 04:30  Phos  3.3     04-24  Mg     2.2     04-24    TPro  5.1<L>  /  Alb  2.6<L>  /  TBili  0.3  /  DBili  x   /  AST  20  /  ALT  <5  /  AlkPhos  151<H>  04-24      PT/INR - ( 23 Apr 2021 04:20 )   PT: 13.50 sec;   INR: 1.17 ratio         PTT - ( 24 Apr 2021 04:30 )  PTT:30.6 sec                                                                                     LIVER FUNCTIONS - ( 24 Apr 2021 04:30 )  Alb: 2.6 g/dL / Pro: 5.1 g/dL / ALK PHOS: 151 U/L / ALT: <5 U/L / AST: 20 U/L / GGT: x                                                                                               Mode: CPAP with PS  FiO2: 30  PEEP: 5  PS: 8                                      ABG - ( 23 Apr 2021 03:26 )  pH, Arterial: 7.47  pH, Blood: x     /  pCO2: 44    /  pO2: 131   / HCO3: 32    / Base Excess: 7.2   /  SaO2: 100           MEDICATIONS  (STANDING):  aMIOdarone    Tablet 200 milliGRAM(s) Oral daily  aspirin  chewable 81 milliGRAM(s) Oral daily  atorvastatin 40 milliGRAM(s) Oral at bedtime  BACItracin   Ointment 1 Application(s) Topical every 12 hours  BACItracin   Ointment 1 Application(s) Topical every 12 hours  chlorhexidine 0.12% Liquid 15 milliLiter(s) Oral Mucosa every 12 hours  collagenase Ointment 1 Application(s) Topical every 12 hours  dextrose 40% Gel 15 Gram(s) Oral once  dextrose 5%. 1000 milliLiter(s) (50 mL/Hr) IV Continuous <Continuous>  dextrose 5%. 1000 milliLiter(s) (100 mL/Hr) IV Continuous <Continuous>  dextrose 50% Injectable 25 Gram(s) IV Push once  dextrose 50% Injectable 12.5 Gram(s) IV Push once  dextrose 50% Injectable 25 Gram(s) IV Push once  doxercalciferol Injectable 2 MICROGram(s) IV Push <User Schedule>  epoetin mana-epbx (RETACRIT) Injectable 39079 Unit(s) IV Push <User Schedule>  estrogens  Cream 1 Gram(s) Vaginal at bedtime  glucagon  Injectable 1 milliGRAM(s) IntraMuscular once  insulin lispro (ADMELOG) corrective regimen sliding scale   SubCutaneous Before meals and at bedtime  lactulose Syrup 15 Gram(s) Oral three times a day  levETIRAcetam  IVPB 375 milliGRAM(s) IV Intermittent every 12 hours  levETIRAcetam  IVPB 250 milliGRAM(s) IV Intermittent <User Schedule>  midodrine 10 milliGRAM(s) Oral every 8 hours  pantoprazole  Injectable 40 milliGRAM(s) IV Push daily  polyethylene glycol 3350 17 Gram(s) Oral at bedtime  senna 2 Tablet(s) Oral at bedtime  sertraline 50 milliGRAM(s) Oral daily  valproic  acid Syrup 250 milliGRAM(s) Oral two times a day    MEDICATIONS  (PRN):  acetaminophen   Tablet .. 650 milliGRAM(s) Oral every 6 hours PRN Temp greater or equal to 38C (100.4F)  HYDROmorphone  Injectable 0.5 milliGRAM(s) IV Push every 4 hours PRN Severe Pain (7 - 10)      New X-rays reviewed:                                                                                  ECHO    CXR interpreted by me:

## 2021-04-25 NOTE — PROGRESS NOTE ADULT - ASSESSMENT
1. Cardiac Arrest: secondary to VFIB in nature, continue supportive care, monitor for now. Monitor for now.    2. Acute Hypoxic Respiratory Failure: secondary to septic/ cardiogenic shock, PNA, continue on lung protective mechanical ventilation for now,  tx nebs, pulmonary toilet, continue supportive care monitor for now. s/p trach, continue supportive care, monitor for now. now 24 hours off of vent support    3. Mixed Cardiogenic/Septic Shock:, resolved secondary to PNA, will continue empiric abx as per ID, continue midodrine 5q8h.     4. Acute Biventricular CHF EF 38% in setting of Severe Aortic stenosis and Mod-Severe MR: continue supportive care s/p BiVICD by EP, monitor for now.    5. PNA: s/p course of abx as per ID, continue pulmonary toilet tx nebs, monitor for now,    6. VFIB: tx amio daily, most likley ischemic in nature. F/u Cardiology recs.    7. B/l Rib Fractures: from fall continue pain control.    8. Right Humeral Fracture: continue supportive care as per Ortho, monitor for now.    9. Right Intertrochanteric Fracture: continue supportive care as per Ortho, s/p Intramedullary rodding of fracture of right femur   monitor for now.     10. Odontoid Fracture: continue nec collar as per Neurology f/u Recs.    11. Seizures: tx AED's as per Neurology monitor for now.    12. Anemia: secondary to hip fracture and AOC transfuse as needed for less then 7, continue supportive care, monitor for now.    13. Sacral Decubitus/ Abscess s/p I/D, f/u surgery recs.    14. CAD s/p CABG: tx ASA, continue supportive care, monitor for now.     15. Bladder Prolapse with Chronic Castellon: continue Castellon monitor for now.    16. DM: tx insulin sliding scale, monitor BSG's, continue supportive care.    17. ESRD on HD: continue renal replacement therapy as per nephrology, monitor for now.    18. DVT/ GI px: tx PPI/ hep gtt.     19. NUT: tx TF's.    20. Afib: tx amio oral, continue supportive care, monitor for now.    21. Left upper ext DVT: tx hep gtt continue supportive care, monitor for now. after peg can start coumadin    22. HTN: Pt is hypotensive tx midodrine continue supportive care, monitor for now.     Discussed case with  family    Pending LTAC placement

## 2021-04-25 NOTE — PROGRESS NOTE ADULT - SUBJECTIVE AND OBJECTIVE BOX
GUS WILBURN 70y Female  MRN#: [id number]       SUBJECTIVE  Patient is a 70y old Female who presents with a chief complaint of Cardiac arrest (23 Apr 2021 12:22)      ***    Today is hospital day 24d. No rhythm strip event overnight. No acute overnight events.     OBJECTIVE  PAST MEDICAL & SURGICAL HISTORY  Diabetes    Congestive heart failure (CHF)    Pleural effusion due to congestive heart failure    End stage renal disease  on dialysis Mon, Wed, Fr    Uterine prolapse    Chronic indwelling Castellon catheter    History of repair of hip fracture  2019    S/P CABG (coronary artery bypass graft)  11/2019    History of thoracentesis    Chronic indwelling Castellon catheter      ALLERGIES:  No Known Allergies    MEDICATIONS:  STANDING MEDICATIONS  aMIOdarone    Tablet 200 milliGRAM(s) Oral daily  aspirin  chewable 81 milliGRAM(s) Oral daily  atorvastatin 40 milliGRAM(s) Oral at bedtime  BACItracin   Ointment 1 Application(s) Topical every 12 hours  BACItracin   Ointment 1 Application(s) Topical every 12 hours  chlorhexidine 0.12% Liquid 15 milliLiter(s) Oral Mucosa every 12 hours  collagenase Ointment 1 Application(s) Topical every 12 hours  dextrose 40% Gel 15 Gram(s) Oral once  dextrose 5%. 1000 milliLiter(s) IV Continuous <Continuous>  dextrose 5%. 1000 milliLiter(s) IV Continuous <Continuous>  dextrose 50% Injectable 25 Gram(s) IV Push once  dextrose 50% Injectable 12.5 Gram(s) IV Push once  dextrose 50% Injectable 25 Gram(s) IV Push once  doxercalciferol Injectable 2 MICROGram(s) IV Push <User Schedule>  epoetin mana-epbx (RETACRIT) Injectable 74368 Unit(s) IV Push <User Schedule>  estrogens  Cream 1 Gram(s) Vaginal at bedtime  glucagon  Injectable 1 milliGRAM(s) IntraMuscular once  insulin lispro (ADMELOG) corrective regimen sliding scale   SubCutaneous Before meals and at bedtime  lactulose Syrup 15 Gram(s) Oral three times a day  levETIRAcetam  IVPB 375 milliGRAM(s) IV Intermittent every 12 hours  levETIRAcetam  IVPB 250 milliGRAM(s) IV Intermittent <User Schedule>  midodrine 5 milliGRAM(s) Oral every 8 hours  pantoprazole  Injectable 40 milliGRAM(s) IV Push daily  polyethylene glycol 3350 17 Gram(s) Oral at bedtime  senna 2 Tablet(s) Oral at bedtime  sertraline 50 milliGRAM(s) Oral daily  valproic  acid Syrup 250 milliGRAM(s) Oral two times a day    PRN MEDICATIONS  acetaminophen   Tablet .. 650 milliGRAM(s) Oral every 6 hours PRN  HYDROmorphone  Injectable 0.5 milliGRAM(s) IV Push every 4 hours PRN      VITAL SIGNS: Last 24 Hours  T(C): 36.9 (25 Apr 2021 04:00), Max: 37 (25 Apr 2021 00:00)  T(F): 98.5 (25 Apr 2021 04:00), Max: 98.6 (25 Apr 2021 00:00)  HR: 114 (25 Apr 2021 06:00) (89 - 124)  BP: 106/58 (25 Apr 2021 06:00) (92/65 - 157/63)  BP(mean): 76 (25 Apr 2021 06:00) (66 - 96)  RR: 22 (25 Apr 2021 06:00) (10 - 55)  SpO2: 100% (25 Apr 2021 06:00) (95% - 100%)    LABS:                        7.8    13.03 )-----------( 285      ( 25 Apr 2021 04:30 )             26.3     04-25    147<H>  |  105  |  27<H>  ----------------------------<  99  3.7   |  30  |  2.5<H>    Ca    8.7      25 Apr 2021 04:30  Phos  3.3     04-25  Mg     2.2     04-25    TPro  5.3<L>  /  Alb  2.7<L>  /  TBili  0.4  /  DBili  x   /  AST  12  /  ALT  <5  /  AlkPhos  150<H>  04-25    PTT - ( 25 Apr 2021 04:30 )  PTT:34.8 sec      RADIOLOGY: reviewed       PHYSICAL EXAM:    GENERAL: NAD, alert  HEENT:  Atraumatic, Normocephalic. EOMI, PERRLA, conjunctiva and sclera clear, No JVD  PULMONARY: Clear to auscultation bilaterally; No wheeze  CARDIOVASCULAR: Regular rate and rhythm; No murmurs, rubs, or gallops  GASTROINTESTINAL: Soft, Nontender, Nondistended; Bowel sounds present  MUSCULOSKELETAL:  2+ Peripheral Pulses  NEUROLOGY: follows commands  SKIN: perirectal abscess, sacral decub, neck and chin ulcer, right shoulder lesion      ADMISSION SUMMARY  Patient is a 70y old Female who presents with a chief complaint of Cardiac arrest (23 Apr 2021 12:22)

## 2021-04-25 NOTE — PROGRESS NOTE ADULT - ASSESSMENT
1. Odontoid fracture. Has neck collar. Neurosurgery following.  2. R hip / R humerus fracture. Ortho following. S/P ORIF.  3. ESRD on HD TTS. HD Tuesday: 3 hours, opti 160 dialyzer, 2K bath, 2L UF.  4. Anemia. EPO with HD.  5. Secondary hyperparathyroidism. Hectorol with HD.

## 2021-04-25 NOTE — PROGRESS NOTE ADULT - ASSESSMENT
1. Cardiac Arrest: secondary to VFIB in nature, continue supportive care, monitor for now. Monitor for now. Biventricular AICD in place. Vfib also on 200mg amiodarone daily    2. Acute Hypoxic Respiratory Failure: secondary to septic/ cardiogenic shock, PNA, continue on lung protective mechanical ventilation for now, will wean FIO2 for sats greater then 92%,  tx nebs, pulmonary toilet, continue supportive care monitor for now. awaiting tracheostomy, f/u surgery regarding timing    3. Mixed Cardiogenic/Septic Shock: secondary to PNA, will continue empiric abx as per ID, off Neosynephrine gtt will, continue monitor for now. s/p course of abx as per ID, continue pulmonary toilet tx nebs, monitor for now,    4. Right Intertrochanteric Fracture: continue supportive care as per Ortho, s/p Intramedullary rodding of fracture of right femur   monitor for now.     5. Right Humeral Fracture: continue supportive care as per Ortho, monitor for now.    6. B/l Rib Fractures: from fall continue pain control.    7. Odontoid Fracture: continue nec collar 8 week course    8. Seizures: on keppra and valproaic acid; spoke with neurology who recommended that the patient remains on and get discharged with these medications until a neurology followup at the [facility] location that she is going to.    9. Anemia: secondary to hip fracture and AOC transfuse as needed for less then 7, continue supportive care, monitor for now.    10. Sacral Decubitus/ Abscess s/p I/D, f/u surgery recs.    11. CAD s/p CABG: tx ASA, continue supportive care, monitor for now.     12. uterine prolapse with Chronic Castellon: continue Castellon monitor for now.    13. DM: tx insulin sliding scale, monitor BSG's, continue supportive care.    14. ESRD on HD: continue renal replacement therapy as per nephrology, monitor for now.    15. DVT/ GI px: tx PPI/ hep gtt.     16Left upper ext DVT: tx hep gtt continue supportive care, monitor for now. restart on heparin 24 hours s/p trach and peg which is approx 3pm today

## 2021-04-25 NOTE — PROGRESS NOTE ADULT - SUBJECTIVE AND OBJECTIVE BOX
Patient is a 70y old  Female who presents with a chief complaint of Cardiac arrest (23 Apr 2021 12:22)        Over Night Events:  No overnight events. Afebrile. No pressors. No sedation.       ROS:     All pertinent ROS are negative except HPI         PHYSICAL EXAM    ICU Vital Signs Last 24 Hrs  T(C): 36.6 (24 Apr 2021 08:00), Max: 37 (24 Apr 2021 04:00)  T(F): 97.9 (24 Apr 2021 08:00), Max: 98.6 (24 Apr 2021 04:00)Dec midodrine to 5 q8h.   HR: 89 (24 Apr 2021 08:25) (72 - 94)  BP: 135/68 (24 Apr 2021 08:25) (112/56 - 163/75)  BP(mean): 96 (24 Apr 2021 08:00) (73 - 108)  RR: 19 (24 Apr 2021 08:25) (12 - 90)  SpO2: 100% (24 Apr 2021 08:25) (99% - 100%)      CONSTITUTIONAL:   Ill appearing.      ENT:   Airway patent,   Mouth with normal mucosa.   No thrush    EYES:   Pupils equal,   Round and reactive to light.    CARDIAC:   Irregular  Regular rhythm.    No edema    RESPIRATORY:   S/p trach  Dec bilateral BS  Normal chest expansion  Not tachypneic,  No use of accessory muscles    GASTROINTESTINAL:  PEG tube  Abdomen soft,   Non-tender,   No guarding,     MUSCULOSKELETAL:   Range of motion is not limited,  No clubbing, cyanosis    NEUROLOGICAL:   Off sedation, follows commands, moves all extremities  Alert and oriented   No motor  deficits.  pertinent DTRs normal    SKIN:   Multiple skin breakdown, wound care nurse following  Skin normal color for race,   Warm and dry  No evidence of rash.    PSYCHIATRIC:   No apparent risk to self or others.      04-23-21 @ 07:01  -  04-24-21 @ 07:00  --------------------------------------------------------  IN:    Enteral Tube Flush: 40 mL    IV PiggyBack: 300 mL  Total IN: 340 mL    OUT:    Indwelling Catheter - Urethral (mL): 0 mL    PEG (Percutaneous Endoscopic Gastrostomy) Tube (mL): 50 mL  Total OUT: 50 mL    Total NET: 290 mL      LABS:                            7.9    15.48 )-----------( 308      ( 24 Apr 2021 04:30 )             26.3                                               04-24    139  |  98  |  43<H>  ----------------------------<  111<H>  4.3   |  28  |  3.1<H>    Ca    8.5      24 Apr 2021 04:30  Phos  3.3     04-24  Mg     2.2     04-24    TPro  5.1<L>  /  Alb  2.6<L>  /  TBili  0.3  /  DBili  x   /  AST  20  /  ALT  <5  /  AlkPhos  151<H>  04-24      PT/INR - ( 23 Apr 2021 04:20 )   PT: 13.50 sec;   INR: 1.17 ratio         PTT - ( 24 Apr 2021 04:30 )  PTT:30.6 sec                                                                                     LIVER FUNCTIONS - ( 24 Apr 2021 04:30 )  Alb: 2.6 g/dL / Pro: 5.1 g/dL / ALK PHOS: 151 U/L / ALT: <5 U/L / AST: 20 U/L / GGT: x                                                                                               Mode: CPAP with PS  FiO2: 30  PEEP: 5  PS: 8                                      ABG - ( 23 Apr 2021 03:26 )  pH, Arterial: 7.47  pH, Blood: x     /  pCO2: 44    /  pO2: 131   / HCO3: 32    / Base Excess: 7.2   /  SaO2: 100           MEDICATIONS  (STANDING):  aMIOdarone    Tablet 200 milliGRAM(s) Oral daily  aspirin  chewable 81 milliGRAM(s) Oral daily  atorvastatin 40 milliGRAM(s) Oral at bedtime  BACItracin   Ointment 1 Application(s) Topical every 12 hours  BACItracin   Ointment 1 Application(s) Topical every 12 hours  chlorhexidine 0.12% Liquid 15 milliLiter(s) Oral Mucosa every 12 hours  collagenase Ointment 1 Application(s) Topical every 12 hours  dextrose 40% Gel 15 Gram(s) Oral once  dextrose 5%. 1000 milliLiter(s) (50 mL/Hr) IV Continuous <Continuous>  dextrose 5%. 1000 milliLiter(s) (100 mL/Hr) IV Continuous <Continuous>  dextrose 50% Injectable 25 Gram(s) IV Push once  dextrose 50% Injectable 12.5 Gram(s) IV Push once  dextrose 50% Injectable 25 Gram(s) IV Push once  doxercalciferol Injectable 2 MICROGram(s) IV Push <User Schedule>  epoetin mana-epbx (RETACRIT) Injectable 33583 Unit(s) IV Push <User Schedule>  estrogens  Cream 1 Gram(s) Vaginal at bedtime  glucagon  Injectable 1 milliGRAM(s) IntraMuscular once  insulin lispro (ADMELOG) corrective regimen sliding scale   SubCutaneous Before meals and at bedtime  lactulose Syrup 15 Gram(s) Oral three times a day  levETIRAcetam  IVPB 375 milliGRAM(s) IV Intermittent every 12 hours  levETIRAcetam  IVPB 250 milliGRAM(s) IV Intermittent <User Schedule>  midodrine 10 milliGRAM(s) Oral every 8 hours  pantoprazole  Injectable 40 milliGRAM(s) IV Push daily  polyethylene glycol 3350 17 Gram(s) Oral at bedtime  senna 2 Tablet(s) Oral at bedtime  sertraline 50 milliGRAM(s) Oral daily  valproic  acid Syrup 250 milliGRAM(s) Oral two times a day    MEDICATIONS  (PRN):  acetaminophen   Tablet .. 650 milliGRAM(s) Oral every 6 hours PRN Temp greater or equal to 38C (100.4F)  HYDROmorphone  Injectable 0.5 milliGRAM(s) IV Push every 4 hours PRN Severe Pain (7 - 10)      New X-rays reviewed:                                                                                  ECHO    CXR interpreted by me:

## 2021-04-25 NOTE — PROGRESS NOTE ADULT - SUBJECTIVE AND OBJECTIVE BOX
GENERAL SURGERY PROGRESS NOTE     GUS WILBURN  [address] [place] [address] day :24d  POD:  Procedure: Implantation of biventricular defibrillator  Intramedullary rodding of fracture of right femur  Creation, tracheostomy, planned, adult  Gastrostomy, open    Surgical Attending: Svetlana  Overnight events: G tube study done contrast in stomach    T(F): 98.6 (04-25-21 @ 00:00), Max: 98.6 (04-24-21 @ 04:00)  HR: 124 (04-25-21 @ 00:00) (82 - 124)  BP: 111/74 (04-25-21 @ 00:00) (92/65 - 163/75)  ABP: --  ABP(mean): --  RR: 24 (04-25-21 @ 00:00) (10 - 55)  SpO2: 100% (04-25-21 @ 00:00) (95% - 100%)    04-23-21 @ 07:01  -  04-24-21 @ 07:00  --------------------------------------------------------  IN:    Enteral Tube Flush: 40 mL    IV PiggyBack: 300 mL  Total IN: 340 mL    OUT:    Indwelling Catheter - Urethral (mL): 0 mL    PEG (Percutaneous Endoscopic Gastrostomy) Tube (mL): 50 mL  Total OUT: 50 mL    Total NET: 290 mL    04-24-21 @ 07:01  -  04-25-21 @ 01:24  --------------------------------------------------------  IN:    IV PiggyBack: 100 mL  Total IN: 100 mL    OUT:    Indwelling Catheter - Urethral (mL): 65 mL    Other (mL): 2000 mL  Total OUT: 2065 mL    Total NET: -1965 mL    DIET/FLUIDS: dextrose 5%. 1000 milliLiter(s) IV Continuous <Continuous>  dextrose 5%. 1000 milliLiter(s) IV Continuous <Continuous>  doxercalciferol Injectable 2 MICROGram(s) IV Push <User Schedule>    URINE:   04-23-21 @ 07:01  -  04-24-21 @ 07:00  --------------------------------------------------------  OUT: 0 mL    GI proph:  pantoprazole  Injectable 40 milliGRAM(s) IV Push daily    AC/ proph: aspirin  chewable 81 milliGRAM(s) Oral daily    ABx:     GEN: NAD,   HEENT: NC/AT  CHEST: Symmetric chest wall expansion. tachycardic  ABD: S/D, non-tender,    LABS  Labs:  CAPILLARY BLOOD GLUCOSE      POCT Blood Glucose.: 116 mg/dL (24 Apr 2021 22:08)  POCT Blood Glucose.: 140 mg/dL (24 Apr 2021 16:29)  POCT Blood Glucose.: 157 mg/dL (24 Apr 2021 11:45)  POCT Blood Glucose.: 124 mg/dL (24 Apr 2021 08:01)                          7.9    15.48 )-----------( 308      ( 24 Apr 2021 04:30 )             26.3       Auto Neutrophil %: 82.8 % (04-24-21 @ 04:30)  Auto Immature Granulocyte %: 4.3 % (04-24-21 @ 04:30)    04-24    139  |  98  |  43<H>  ----------------------------<  111<H>  4.3   |  28  |  3.1<H>      Phosphorus Level, Serum: 3.3 mg/dL (04-24-21 @ 04:30)      LFTs:             5.1  | 0.3  | 20       ------------------[151     ( 24 Apr 2021 04:30 )  2.6  | x    | <5          Lipase:x      Amylase:x         Blood Gas Arterial, Lactate: 0.6 mmoL/L (04-23-21 @ 03:26)  Blood Gas Arterial, Lactate: 0.6 mmoL/L (04-22-21 @ 02:19)    ABG - ( 23 Apr 2021 03:26 )  pH: 7.47  /  pCO2: 44    /  pO2: 131   / HCO3: 32    / Base Excess: 7.2   /  SaO2: 100             ABG - ( 22 Apr 2021 02:19 )  pH: 7.41  /  pCO2: 46    /  pO2: 128   / HCO3: 30    / Base Excess: 4.3   /  SaO2: 99              ABG - ( 21 Apr 2021 03:52 )  pH: 7.46  /  pCO2: 43    /  pO2: 128   / HCO3: 30    / Base Excess: 5.8   /  SaO2: 100         Coags:     x      ----< x       ( 24 Apr 2021 04:30 )     30.6        RADIOLOGY & ADDITIONAL TESTS:

## 2021-04-25 NOTE — PROGRESS NOTE ADULT - SUBJECTIVE AND OBJECTIVE BOX
[place] [facility]PHR[facility]Y FOLLOW UP NOTE  --------------------------------------------------------------------------------  24 hour events/subjective: Patient examined. Trached.    PAST HISTORY  --------------------------------------------------------------------------------  No significant changes to PMH, PSH, FHx, SHx, unless otherwise noted    ALLERGIES & MEDICATIONS  --------------------------------------------------------------------------------  Allergies    No Known Allergies    Standing Inpatient Medications  aMIOdarone    Tablet 200 milliGRAM(s) Oral daily  aspirin  chewable 81 milliGRAM(s) Oral daily  atorvastatin 40 milliGRAM(s) Oral at bedtime  BACItracin   Ointment 1 Application(s) Topical every 12 hours  BACItracin   Ointment 1 Application(s) Topical every 12 hours  chlorhexidine 0.12% Liquid 15 milliLiter(s) Oral Mucosa every 12 hours  collagenase Ointment 1 Application(s) Topical every 12 hours  dextrose 40% Gel 15 Gram(s) Oral once  dextrose 5%. 1000 milliLiter(s) IV Continuous <Continuous>  dextrose 5%. 1000 milliLiter(s) IV Continuous <Continuous>  dextrose 50% Injectable 25 Gram(s) IV Push once  dextrose 50% Injectable 12.5 Gram(s) IV Push once  dextrose 50% Injectable 25 Gram(s) IV Push once  doxercalciferol Injectable 2 MICROGram(s) IV Push <User Schedule>  epoetin mana-epbx (RETACRIT) Injectable 60203 Unit(s) IV Push <User Schedule>  estrogens  Cream 1 Gram(s) Vaginal at bedtime  glucagon  Injectable 1 milliGRAM(s) IntraMuscular once  insulin lispro (ADMELOG) corrective regimen sliding scale   SubCutaneous Before meals and at bedtime  lactulose Syrup 15 Gram(s) Oral three times a day  levETIRAcetam  IVPB 375 milliGRAM(s) IV Intermittent every 12 hours  levETIRAcetam  IVPB 250 milliGRAM(s) IV Intermittent <User Schedule>  midodrine 5 milliGRAM(s) Oral every 8 hours  pantoprazole  Injectable 40 milliGRAM(s) IV Push daily  polyethylene glycol 3350 17 Gram(s) Oral at bedtime  senna 2 Tablet(s) Oral at bedtime  sertraline 50 milliGRAM(s) Oral daily  sodium chloride 0.9%. 1000 milliLiter(s) IV Continuous <Continuous>  valproic  acid Syrup 250 milliGRAM(s) Oral two times a day    PRN Inpatient Medications  acetaminophen   Tablet .. 650 milliGRAM(s) Oral every 6 hours PRN  HYDROmorphone  Injectable 0.5 milliGRAM(s) IV Push every 4 hours PRN    VITALS/PHYSICAL EXAM  --------------------------------------------------------------------------------  T(C): 37.4 (04-25-21 @ 12:00), Max: 37.4 (04-25-21 @ 12:00)  HR: 126 (04-25-21 @ 12:00) (106 - 126)  BP: 107/61 (04-25-21 @ 12:00) (92/65 - 116/65)  RR: 101 (04-25-21 @ 12:00) (10 - 101)  SpO2: 100% (04-25-21 @ 12:00) (95% - 100%)    04-24-21 @ 07:01  -  04-25-21 @ 07:00  --------------------------------------------------------  IN: 100 mL / OUT: 2120 mL / NET: -2020 mL    04-25-21 @ 07:01  -  04-25-21 @ 13:30  --------------------------------------------------------  IN: 100 mL / OUT: 0 mL / NET: 100 mL    Physical Exam:  	Gen: Trached  	Pulm: Clear anteriorly  	CV: RRR, S1S2  	Abd: +BS, soft, nontender/nondistended  	: No suprapubic tenderness  	LE: Warm, edema  	Vascular access: TDC    LABS/STUDIES  --------------------------------------------------------------------------------              7.8    13.03 >-----------<  285      [04-25-21 @ 04:30]              26.3     147  |  105  |  27  ----------------------------<  99      [04-25-21 @ 04:30]  3.7   |  30  |  2.5        Ca     8.7     [04-25-21 @ 04:30]      Mg     2.2     [04-25-21 @ 04:30]      Phos  3.3     [04-25-21 @ 04:30]    TPro  5.3  /  Alb  2.7  /  TBili  0.4  /  DBili  x   /  AST  12  /  ALT  <5  /  AlkPhos  150  [04-25-21 @ 04:30]    PTT: 34.8       [04-25-21 @ 04:30]    Creatinine Trend:  SCr 2.5 [04-25 @ 04:30]  SCr 3.1 [04-24 @ 04:30]  SCr 2.7 [04-23 @ 04:20]  SCr 3.3 [04-22 @ 04:10]  SCr 2.8 [04-21 @ 04:40]    Urinalysis - [04-02-21 @ 10:44]      Color Yellow / Appearance Turbid / SG 1.027 / pH 7.0      Gluc Negative / Ketone Negative  / Bili Negative / Urobili <2 mg/dL       Blood Moderate / Protein 100 mg/dL / Leuk Est Large / Nitrite Negative      RBC 14 / WBC >720 / Hyaline 16 / Gran  / Sq Epi  / Non Sq Epi 3 / Bacteria Moderate    Iron 55, TIBC 167, %sat 33      [05-27-20 @ 13:22]  Ferritin 636      [05-27-20 @ 13:22]  HbA1c 5.7      [11-09-19 @ 01:20]  TSH 1.71      [04-06-21 @ 04:40]

## 2021-04-25 NOTE — PROGRESS NOTE ADULT - ASSESSMENT
A/P:  GUS WILBURN is a 70yFemale s/p trach/PEG. g tube contrast study shows contrast in stomach    Plan:   f/u final g tube study read, if appropriate, ok to use tube for feeds

## 2021-04-26 NOTE — PROGRESS NOTE ADULT - SUBJECTIVE AND OBJECTIVE BOX
Currently admitted to medicine with the primary diagnosis of Cardiopulmonary arrest, now s/p Trach and PEG placement pending placement at [facility].    Hospital day 25. No acute events overnight. Ptn awake and oriented of sedation, following commands. Denies discomfort at this time neither from Trach/PEG site nor elsewhere. ROS otherwise unchanged.     PAST MEDICAL & SURGICAL HISTORY  Diabetes    Congestive heart failure (CHF)    Pleural effusion due to congestive heart failure    End stage renal disease  on dialysis Mon, Wed, Fr    Uterine prolapse    Chronic indwelling Castellon catheter    History of repair of hip fracture  2019    S/P CABG (coronary artery bypass graft)  11/2019    History of thoracentesis    Chronic indwelling Castellon catheter    ALLERGIES:  No Known Allergies    MEDICATIONS:  STANDING MEDICATIONS  aMIOdarone    Tablet 200 milliGRAM(s) Oral daily  atorvastatin 40 milliGRAM(s) Oral at bedtime  BACItracin   Ointment 1 Application(s) Topical every 12 hours  BACItracin   Ointment 1 Application(s) Topical every 12 hours  chlorhexidine 0.12% Liquid 15 milliLiter(s) Oral Mucosa every 12 hours  collagenase Ointment 1 Application(s) Topical every 12 hours  dextrose 40% Gel 15 Gram(s) Oral once  dextrose 5%. 1000 milliLiter(s) IV Continuous <Continuous>  dextrose 5%. 1000 milliLiter(s) IV Continuous <Continuous>  dextrose 50% Injectable 25 Gram(s) IV Push once  dextrose 50% Injectable 25 Gram(s) IV Push once  dextrose 50% Injectable 12.5 Gram(s) IV Push once  doxercalciferol Injectable 2 MICROGram(s) IV Push <User Schedule>  epoetin mana-epbx (RETACRIT) Injectable 66507 Unit(s) IV Push <User Schedule>  estrogens  Cream 1 Gram(s) Vaginal at bedtime  glucagon  Injectable 1 milliGRAM(s) IntraMuscular once  heparin  Infusion 1200 Unit(s)/Hr IV Continuous <Continuous>  insulin lispro (ADMELOG) corrective regimen sliding scale   SubCutaneous Before meals and at bedtime  lactulose Syrup 15 Gram(s) Oral three times a day  levETIRAcetam  IVPB 375 milliGRAM(s) IV Intermittent every 12 hours  levETIRAcetam  IVPB 250 milliGRAM(s) IV Intermittent <User Schedule>  magnesium citrate Oral Solution 1 Bottle Oral once  midodrine 5 milliGRAM(s) Oral every 8 hours  pantoprazole  Injectable 40 milliGRAM(s) IV Push daily  polyethylene glycol 3350 17 Gram(s) Oral at bedtime  senna 2 Tablet(s) Oral at bedtime  sertraline 50 milliGRAM(s) Oral daily  sodium chloride 0.9%. 1000 milliLiter(s) IV Continuous <Continuous>  valproic  acid Syrup 250 milliGRAM(s) Oral two times a day    PRN MEDICATIONS  acetaminophen   Tablet .. 650 milliGRAM(s) Oral every 6 hours PRN  HYDROmorphone  Injectable 0.5 milliGRAM(s) IV Push every 4 hours PRN    VITALS:   T(F): 97.8  HR: 88  BP: 111/62  RR: 21  SpO2: 100%    LABS:                        7.3    10.70 )-----------( 273      ( 26 Apr 2021 04:30 )             25.4     04-26    145  |  106  |  33<H>  ----------------------------<  155<H>  3.8   |  27  |  2.8<H>    Ca    8.2<L>      26 Apr 2021 04:30  Phos  3.3     04-25  Mg     2.3     04-26    TPro  5.0<L>  /  Alb  2.5<L>  /  TBili  0.4  /  DBili  x   /  AST  11  /  ALT  <5  /  AlkPhos  149<H>  04-26    PTT - ( 25 Apr 2021 04:30 )  PTT:34.8 sec    RADIOLOGY:   < from: Xray Chest 1 View- PORTABLE-Routine (Xray Chest 1 View- PORTABLE-Routine in AM.) (04.25.21 @ 05:56) >  Impression:    Status post a median sternotomy and cardiomegaly.    Left basilar opacity, unchanged.    Support tubes and lines as above.    Left-sided permanent pacemaker.        PHYSICAL EXAM:  GEN: No acute distress, Trach tube in place   LUNGS: Clear to auscultation bilaterally   HEART: Regular  ABD: Soft, non-tender, non-distended. PEG tube entry site warm and dry with no discharge/erthyema  EXT: NC/NC/NE/2+PP/GEORGE/Skin Intact.   NEURO: follows commands, not on any sedation. Unable to assess AO status d/t Trach    Intravenous access: Central IJ (7 days old)  PEG tube: no discharge around entry site, denies discomfort from area  Indwelling Urethral Catheter:     Connect To:  Straight Drainage/Gravity    Indication:  Urine Output Monitoring in Critically Ill

## 2021-04-26 NOTE — PROGRESS NOTE ADULT - ASSESSMENT
Impression:  ARF   cardiac arrest unknown etiology   arrythmia V tach ./ afib s/p AICD   multiple fx , hip, humerus , neck   shock ?? cardiogenic   seizure   HD   left arm dvt   retroperitoneal drip     PLAN:    CNS: sedation as needed     HEENT: oral care     PULMONARY:  TP as needed       CARDIOVASCULAR: follow EP / ct surgery   cardiology follow up     GI: GI prophylaxis.  peg feed     RENAL: HD as per renal     INFECTIOUS DISEASE: off abx       HEMATOLOGICAL:  DVT prophylaxis. resume heparin drip with bolus target ptt 50-55  follow h/h     ENDOCRINE:  Follow up FS.  Insulin protocol if needed.  follow orthopedics     pending transfer to [facility]

## 2021-04-26 NOTE — PROGRESS NOTE ADULT - SUBJECTIVE AND OBJECTIVE BOX
[place] [facility] FOLLOW UP NOTE  --------------------------------------------------------------------------------  24 hour events/subjective: Patient examined. Trached.    PAST HISTORY  --------------------------------------------------------------------------------  No significant changes to PMH, PSH, FHx, SHx, unless otherwise noted    ALLERGIES & MEDICATIONS  --------------------------------------------------------------------------------  Allergies    No Known Allergies    Standing Inpatient Medications  aMIOdarone    Tablet 200 milliGRAM(s) Oral daily  atorvastatin 40 milliGRAM(s) Oral at bedtime  BACItracin   Ointment 1 Application(s) Topical every 12 hours  BACItracin   Ointment 1 Application(s) Topical every 12 hours  chlorhexidine 0.12% Liquid 15 milliLiter(s) Oral Mucosa every 12 hours  collagenase Ointment 1 Application(s) Topical every 12 hours  dextrose 40% Gel 15 Gram(s) Oral once  dextrose 5%. 1000 milliLiter(s) IV Continuous <Continuous>  dextrose 5%. 1000 milliLiter(s) IV Continuous <Continuous>  dextrose 50% Injectable 25 Gram(s) IV Push once  dextrose 50% Injectable 12.5 Gram(s) IV Push once  dextrose 50% Injectable 25 Gram(s) IV Push once  doxercalciferol Injectable 2 MICROGram(s) IV Push <User Schedule>  epoetin mana-epbx (RETACRIT) Injectable 79211 Unit(s) IV Push <User Schedule>  estrogens  Cream 1 Gram(s) Vaginal at bedtime  glucagon  Injectable 1 milliGRAM(s) IntraMuscular once  heparin  Infusion 1200 Unit(s)/Hr IV Continuous <Continuous>  insulin lispro (ADMELOG) corrective regimen sliding scale   SubCutaneous Before meals and at bedtime  lactulose Syrup 15 Gram(s) Oral three times a day  levETIRAcetam  IVPB 375 milliGRAM(s) IV Intermittent every 12 hours  levETIRAcetam  IVPB 250 milliGRAM(s) IV Intermittent <User Schedule>  magnesium citrate Oral Solution 1 Bottle Oral once  midodrine 5 milliGRAM(s) Oral every 8 hours  pantoprazole  Injectable 40 milliGRAM(s) IV Push daily  polyethylene glycol 3350 17 Gram(s) Oral at bedtime  senna 2 Tablet(s) Oral at bedtime  sertraline 50 milliGRAM(s) Oral daily  sodium chloride 0.9%. 1000 milliLiter(s) IV Continuous <Continuous>  valproic  acid Syrup 250 milliGRAM(s) Oral two times a day    PRN Inpatient Medications  acetaminophen   Tablet .. 650 milliGRAM(s) Oral every 6 hours PRN  HYDROmorphone  Injectable 0.5 milliGRAM(s) IV Push every 4 hours PRN      VITALS/PHYSICAL EXAM  --------------------------------------------------------------------------------  T(C): 36.6 (04-26-21 @ 08:00), Max: 37.1 (04-25-21 @ 16:00)  HR: 102 (04-26-21 @ 10:00) (88 - 120)  BP: 110/60 (04-26-21 @ 10:00) (98/62 - 126/68)  RR: 79 (04-26-21 @ 10:00) (16 - 79)  SpO2: 100% (04-26-21 @ 10:00) (100% - 100%)    04-25-21 @ 07:01  -  04-26-21 @ 07:00  --------------------------------------------------------  IN: 1740 mL / OUT: 5 mL / NET: 1735 mL    Physical Exam:  	Gen: trached  	Pulm: CTA B/L  	CV: RRR, S1S2  	Abd: +BS, soft, nontender/nondistended  	: No suprapubic tenderness  	LE: Warm,  edema  	Vascular access: TDC    LABS/STUDIES  --------------------------------------------------------------------------------              7.3    10.70 >-----------<  273      [04-26-21 @ 04:30]              25.4     145  |  106  |  33  ----------------------------<  155      [04-26-21 @ 04:30]  3.8   |  27  |  2.8        Ca     8.2     [04-26-21 @ 04:30]      Mg     2.3     [04-26-21 @ 04:30]      Phos  3.3     [04-25-21 @ 04:30]    TPro  5.0  /  Alb  2.5  /  TBili  0.4  /  DBili  x   /  AST  11  /  ALT  <5  /  AlkPhos  149  [04-26-21 @ 04:30]    PTT: 34.8       [04-25-21 @ 04:30]    Creatinine Trend:  SCr 2.8 [04-26 @ 04:30]  SCr 2.5 [04-25 @ 04:30]  SCr 3.1 [04-24 @ 04:30]  SCr 2.7 [04-23 @ 04:20]  SCr 3.3 [04-22 @ 04:10]    Urinalysis - [04-02-21 @ 10:44]      Color Yellow / Appearance Turbid / SG 1.027 / pH 7.0      Gluc Negative / Ketone Negative  / Bili Negative / Urobili <2 mg/dL       Blood Moderate / Protein 100 mg/dL / Leuk Est Large / Nitrite Negative      RBC 14 / WBC >720 / Hyaline 16 / Gran  / Sq Epi  / Non Sq Epi 3 / Bacteria Moderate    Iron 55, TIBC 167, %sat 33      [05-27-20 @ 13:22]  Ferritin 636      [05-27-20 @ 13:22]  HbA1c 5.7      [11-09-19 @ 01:20]  TSH 1.71      [04-06-21 @ 04:40]

## 2021-04-26 NOTE — PROGRESS NOTE ADULT - ASSESSMENT
IMP:  - fall with multiple fractures  - cardiac arrest    - cardiogenic shock    - EF 20%  - resp failure/ vent dependence  - ESRD on HD  - DM     REE by PSE 1262 kcal/d, est protein needs ~ 80 gm/d  f/u phos with daily am labs - note relatively low phos levels, d/w nephrology  document BMs, and note that pt is on a large amount of fiber and cathartic meds  pt cont to get  Pepatmen AF at 45 ml/h - but insulin being given as "pre-meal"  will transition to polymeric formula, and to intermittent feeding - then finally the insulin treatment will parallel the feedings  add Chris twice a day - mix each package in ~ 4 oz water and give via NG over 10 min immediately prior to first 2 feeds of the day  glycemic control !!

## 2021-04-26 NOTE — PROGRESS NOTE ADULT - ASSESSMENT
Impression:  ARF   cardiac arrest unknown etiology   arrythmia V tach ./ afib s/p AICD   multiple fx , hip, humerus , neck   shock ?? cardiogenic   seizure   HD   left arm dvt   retroperitoneal drip     PLAN:    CNS: Off sedation at this time. c/w monitoring off sedation. c/w Keppra  Q12H and 250mg IV Qweekly. Neuro f/u o/p at [facility]    HEENT: oral care     PULMONARY:  TP as needed     CARDIOVASCULAR: follow EP. Vascular signed off April 18th.    GI: GI prophylaxis, start PEG feeds w Peptamen feed rate increased gradually    RENAL: HD as per renal TTS    INFECTIOUS DISEASE: off abx     HEMATOLOGICAL:  DVT prophylaxis. resume heparin drip with bolus target ptt 50-55  follow h/h     MUSCULOSKELETAL:   b/l rib fractures from fall--> no acute intervention indicated-->pain control PRN  Right intertrochanteric fracture s/p intermedullary rodding by Ortho--> o/p f/u by Ortho  Odontoid Fracture--> c/w neck brace 8 weeks    ENDOCRINE:  Follow up FS.  c/w sliding scale Lispro for now    pending transfer to [facility]

## 2021-04-26 NOTE — PROGRESS NOTE ADULT - SUBJECTIVE AND OBJECTIVE BOX
Patient is a 70y old  Female who presents with a chief complaint of Cardiac arrest (23 Apr 2021 12:22)      Over Night Events:  Patient seen and examined.   been tolerating tp for more then 24 hrs     ROS:  See HPI    PHYSICAL EXAM    ICU Vital Signs Last 24 Hrs  T(C): 36.6 (26 Apr 2021 08:00), Max: 37.4 (25 Apr 2021 12:00)  T(F): 97.8 (26 Apr 2021 08:00), Max: 99.3 (25 Apr 2021 12:00)  HR: 88 (26 Apr 2021 08:00) (88 - 126)  BP: 111/62 (26 Apr 2021 08:00) (98/62 - 126/68)  BP(mean): 89 (26 Apr 2021 08:00) (72 - 95)  ABP: --  ABP(mean): --  RR: 21 (26 Apr 2021 08:00) (16 - 46)  SpO2: 100% (26 Apr 2021 08:00) (100% - 100%)      General: awake follow command   HEENT:     tarcheostomy            Lymph Nodes: NO cervical LN   Lungs: Bilateral BS  Cardiovascular: Regular   Abdomen: Soft, Positive BS  Extremities: No clubbing   Skin: warm   Neurological: no focal   Musculoskeletal: move all ext     I&O's Detail    25 Apr 2021 07:01  -  26 Apr 2021 07:00  --------------------------------------------------------  IN:    IV PiggyBack: 100 mL    Peptamen A.F.: 440 mL    sodium chloride 0.9%: 1200 mL  Total IN: 1740 mL    OUT:    Indwelling Catheter - Urethral (mL): 5 mL  Total OUT: 5 mL    Total NET: 1735 mL          LABS:                          7.3    10.70 )-----------( 273      ( 26 Apr 2021 04:30 )             25.4         26 Apr 2021 04:30    145    |  106    |  33     ----------------------------<  155    3.8     |  27     |  2.8      Ca    8.2        26 Apr 2021 04:30  Phos  3.3       25 Apr 2021 04:30  Mg     2.3       26 Apr 2021 04:30    TPro  5.0    /  Alb  2.5    /  TBili  0.4    /  DBili  x      /  AST  11     /  ALT  <5     /  AlkPhos  149    26 Apr 2021 04:30  Amylase x     lipase x                                                 PTT - ( 25 Apr 2021 04:30 )  PTT:34.8 sec                                                                                                                                                Mode: standby  FiO2: 40                                          MEDICATIONS  (STANDING):  aMIOdarone    Tablet 200 milliGRAM(s) Oral daily  aspirin  chewable 81 milliGRAM(s) Oral daily  atorvastatin 40 milliGRAM(s) Oral at bedtime  BACItracin   Ointment 1 Application(s) Topical every 12 hours  BACItracin   Ointment 1 Application(s) Topical every 12 hours  chlorhexidine 0.12% Liquid 15 milliLiter(s) Oral Mucosa every 12 hours  collagenase Ointment 1 Application(s) Topical every 12 hours  dextrose 40% Gel 15 Gram(s) Oral once  dextrose 5%. 1000 milliLiter(s) (100 mL/Hr) IV Continuous <Continuous>  dextrose 5%. 1000 milliLiter(s) (50 mL/Hr) IV Continuous <Continuous>  dextrose 50% Injectable 25 Gram(s) IV Push once  dextrose 50% Injectable 12.5 Gram(s) IV Push once  dextrose 50% Injectable 25 Gram(s) IV Push once  doxercalciferol Injectable 2 MICROGram(s) IV Push <User Schedule>  epoetin mana-epbx (RETACRIT) Injectable 71611 Unit(s) IV Push <User Schedule>  estrogens  Cream 1 Gram(s) Vaginal at bedtime  glucagon  Injectable 1 milliGRAM(s) IntraMuscular once  heparin   Injectable 5000 Unit(s) SubCutaneous every 8 hours  insulin lispro (ADMELOG) corrective regimen sliding scale   SubCutaneous Before meals and at bedtime  lactulose Syrup 15 Gram(s) Oral three times a day  levETIRAcetam  IVPB 375 milliGRAM(s) IV Intermittent every 12 hours  levETIRAcetam  IVPB 250 milliGRAM(s) IV Intermittent <User Schedule>  midodrine 5 milliGRAM(s) Oral every 8 hours  pantoprazole  Injectable 40 milliGRAM(s) IV Push daily  polyethylene glycol 3350 17 Gram(s) Oral at bedtime  senna 2 Tablet(s) Oral at bedtime  sertraline 50 milliGRAM(s) Oral daily  sodium chloride 0.9%. 1000 milliLiter(s) (75 mL/Hr) IV Continuous <Continuous>  valproic  acid Syrup 250 milliGRAM(s) Oral two times a day    MEDICATIONS  (PRN):  acetaminophen   Tablet .. 650 milliGRAM(s) Oral every 6 hours PRN Temp greater or equal to 38C (100.4F)  HYDROmorphone  Injectable 0.5 milliGRAM(s) IV Push every 4 hours PRN Severe Pain (7 - 10)          Xrays:  TLC:  OG:  ET tube:                                                                                    no infiltrate    ECHO:  [facility] ICU:

## 2021-04-26 NOTE — PROGRESS NOTE ADULT - SUBJECTIVE AND OBJECTIVE BOX
s/p trach, now on T-piece  right IJ TLC in place c/d/i  still neck brace  R chest Tesio - HD TTS  abd soft, ND< NT, + BM last evening, reported as looser after lactulose given  Vital Signs Last 24 Hrs  T(C): 36.6 (26 Apr 2021 08:00), Max: 37.1 (25 Apr 2021 16:00)  T(F): 97.8 (26 Apr 2021 08:00), Max: 98.7 (25 Apr 2021 16:00)  HR: 102 (26 Apr 2021 10:00) (88 - 120)  BP: 110/60 (26 Apr 2021 10:00) (98/62 - 126/68)  BP(mean): 81 (26 Apr 2021 10:00) (72 - 95)  RR: 79 (26 Apr 2021 10:00) (16 - 79)  SpO2: 100% (26 Apr 2021 10:00) (100% - 100%)    MEDICATIONS  (STANDING):  aMIOdarone    Tablet 200 milliGRAM(s) Oral daily  atorvastatin 40 milliGRAM(s) Oral at bedtime  BACItracin   Ointment 1 Application(s) Topical every 12 hours  BACItracin   Ointment 1 Application(s) Topical every 12 hours  chlorhexidine 0.12% Liquid 15 milliLiter(s) Oral Mucosa every 12 hours  collagenase Ointment 1 Application(s) Topical every 12 hours  doxercalciferol Injectable 2 MICROGram(s) IV Push <User Schedule>  epoetin mana-epbx (RETACRIT) Injectable 37486 Unit(s) IV Push <User Schedule>  estrogens  Cream 1 Gram(s) Vaginal at bedtime  heparin  Infusion 1200 Unit(s)/Hr (12 mL/Hr) IV Continuous <Continuous>  insulin lispro (ADMELOG) corrective regimen sliding scale   SubCutaneous Before meals and at bedtime - but pt is on continuous drip feeds  lactulose Syrup 15 Gram(s) Oral three times a day  levETIRAcetam  IVPB 375 milliGRAM(s) IV Intermittent every 12 hours  levETIRAcetam  IVPB 250 milliGRAM(s) IV Intermittent <User Schedule>  magnesium citrate Oral Solution 1 Bottle Oral once  midodrine 5 milliGRAM(s) Oral every 8 hours  pantoprazole  Injectable 40 milliGRAM(s) IV Push daily  polyethylene glycol 3350 17 Gram(s) Oral at bedtime  senna 2 Tablet(s) Oral at bedtime  sertraline 50 milliGRAM(s) Oral daily  sodium chloride 0.9%. 1000 milliLiter(s) (75 mL/Hr) IV Continuous <Continuous>  valproic  acid Syrup 250 milliGRAM(s) Oral two times a day                        7.3    10.70 )-----------( 273      ( 26 Apr 2021 04:30 )             25.4   04-26    145  |  106  |  33<H>  ----------------------------<  155<H>  3.8   |  27  |  2.8<H>    Ca    8.2<L>      26 Apr 2021 04:30  Phos  3.3     04-25  Mg     2.3     04-26    TPro  5.0<L>  /  Alb  2.5<L>  /  TBili  0.4  /  DBili  x   /  AST  11  /  ALT  <5  /  AlkPhos  149<H>  04-26    POCT Blood Glucose.: 176 mg/dL (26 Apr 2021 12:26)  POCT Blood Glucose.: 172 mg/dL (26 Apr 2021 06:01)  POCT Blood Glucose.: 130 mg/dL (25 Apr 2021 21:33)  POCT Blood Glucose.: 102 mg/dL (25 Apr 2021 17:32)

## 2021-04-26 NOTE — PROCEDURE NOTE - NSPROCDETAILS_GEN_ALL_CORE
location identified, draped/prepped, sterile technique used/sterile dressing applied/sterile technique, catheter placed/ultrasound guidance
guidewire recovered/lumen(s) aspirated and flushed/sterile dressing applied/sterile technique, catheter placed/ultrasound guidance with use of sterile gel and probe cove

## 2021-04-27 NOTE — PROGRESS NOTE ADULT - SUBJECTIVE AND OBJECTIVE BOX
[facility] FOLLOW UP NOTE  --------------------------------------------------------------------------------  24 hour events/subjective: Patient examined. Trached.    PAST HISTORY  --------------------------------------------------------------------------------  No significant changes to PMH, PSH, FHx, SHx, unless otherwise noted    ALLERGIES & MEDICATIONS  --------------------------------------------------------------------------------  Allergies    No Known Allergies    Standing Inpatient Medications  aMIOdarone    Tablet 200 milliGRAM(s) Oral daily  atorvastatin 40 milliGRAM(s) Oral at bedtime  BACItracin   Ointment 1 Application(s) Topical every 12 hours  BACItracin   Ointment 1 Application(s) Topical every 12 hours  chlorhexidine 0.12% Liquid 15 milliLiter(s) Oral Mucosa every 12 hours  collagenase Ointment 1 Application(s) Topical every 12 hours  dextrose 40% Gel 15 Gram(s) Oral once  dextrose 5%. 1000 milliLiter(s) IV Continuous <Continuous>  dextrose 5%. 1000 milliLiter(s) IV Continuous <Continuous>  dextrose 50% Injectable 25 Gram(s) IV Push once  dextrose 50% Injectable 12.5 Gram(s) IV Push once  dextrose 50% Injectable 25 Gram(s) IV Push once  doxercalciferol Injectable 2 MICROGram(s) IV Push <User Schedule>  epoetin mana-epbx (RETACRIT) Injectable 26631 Unit(s) IV Push <User Schedule>  estrogens  Cream 1 Gram(s) Vaginal at bedtime  glucagon  Injectable 1 milliGRAM(s) IntraMuscular once  heparin  Infusion 1100 Unit(s)/Hr IV Continuous <Continuous>  insulin lispro (ADMELOG) corrective regimen sliding scale   SubCutaneous Before meals and at bedtime  lactulose Syrup 15 Gram(s) Oral three times a day  levETIRAcetam  IVPB 375 milliGRAM(s) IV Intermittent every 12 hours  levETIRAcetam  IVPB 250 milliGRAM(s) IV Intermittent <User Schedule>  midodrine 5 milliGRAM(s) Oral every 8 hours  pantoprazole  Injectable 40 milliGRAM(s) IV Push daily  polyethylene glycol 3350 17 Gram(s) Oral at bedtime  senna 2 Tablet(s) Oral at bedtime  sertraline 50 milliGRAM(s) Oral daily  valproic  acid Syrup 250 milliGRAM(s) Oral two times a day    PRN Inpatient Medications  acetaminophen   Tablet .. 650 milliGRAM(s) Oral every 6 hours PRN  HYDROmorphone  Injectable 0.5 milliGRAM(s) IV Push every 4 hours PRN      VITALS/PHYSICAL EXAM  --------------------------------------------------------------------------------  T(C): 36.7 (04-27-21 @ 08:00), Max: 37.1 (04-26-21 @ 16:00)  HR: 109 (04-27-21 @ 12:55) (92 - 120)  BP: 109/59 (04-27-21 @ 12:55) (98/56 - 112/54)  RR: 31 (04-27-21 @ 12:55) (17 - 121)  SpO2: 100% (04-27-21 @ 12:55) (96% - 100%)    04-26-21 @ 07:01  -  04-27-21 @ 07:00  --------------------------------------------------------  IN: 2664 mL / OUT: 10 mL / NET: 2654 mL    04-27-21 @ 07:01  -  04-27-21 @ 13:31  --------------------------------------------------------  IN: 22 mL / OUT: 0 mL / NET: 22 mL    Physical Exam:  	Gen: NAD  	Pulm: CTA B/L  	CV: RRR, S1S2  	Abd: +BS, soft, nontender/nondistended  	: No suprapubic tenderness  	LE: Warm,  edema  	Vascular access: TDC    LABS/STUDIES  --------------------------------------------------------------------------------              7.7    11.26 >-----------<  286      [04-27-21 @ 04:31]              26.5     143  |  106  |  43  ----------------------------<  154      [04-27-21 @ 04:31]  4.4   |  26  |  3.5        Ca     8.5     [04-27-21 @ 04:31]      Mg     2.3     [04-26-21 @ 04:30]    TPro  5.2  /  Alb  2.6  /  TBili  0.3  /  DBili  x   /  AST  12  /  ALT  <5  /  AlkPhos  164  [04-27-21 @ 04:31]    PTT: 60.2       [04-27-21 @ 04:31]    Creatinine Trend:  SCr 3.5 [04-27 @ 04:31]  SCr 2.8 [04-26 @ 04:30]  SCr 2.5 [04-25 @ 04:30]  SCr 3.1 [04-24 @ 04:30]  SCr 2.7 [04-23 @ 04:20]    Urinalysis - [04-02-21 @ 10:44]      Color Yellow / Appearance Turbid / SG 1.027 / pH 7.0      Gluc Negative / Ketone Negative  / Bili Negative / Urobili <2 mg/dL       Blood Moderate / Protein 100 mg/dL / Leuk Est Large / Nitrite Negative      RBC 14 / WBC >720 / Hyaline 16 / Gran  / Sq Epi  / Non Sq Epi 3 / Bacteria Moderate    Iron 55, TIBC 167, %sat 33      [05-27-20 @ 13:22]  Ferritin 636      [05-27-20 @ 13:22]  HbA1c 5.7      [11-09-19 @ 01:20]  TSH 1.71      [04-06-21 @ 04:40]

## 2021-04-27 NOTE — PROGRESS NOTE ADULT - ASSESSMENT
Impression:  ARF   cardiac arrest unknown etiology   arrythmia V tach ./ afib s/p AICD   multiple fx , hip, humerus , neck   shock ?? cardiogenic   seizure   HD   left arm dvt   retroperitoneal drip     PLAN:    CNS: Off sedation at this time. c/w monitoring off sedation. c/w Keppra  Q12H and 250mg IV Qweekly. Neuro f/u o/p at [facility]    HEENT: oral care     PULMONARY:  TP as needed     CARDIOVASCULAR: Discussed with EP, tachycardia s/p AICD ok given multiple comorbidities, will follow o/p with Dr Solo.  Will f/u with vascular regarding home Toprol-->held in hospital-->potential for enhanced HR control if resumed prior to d/c   Vascular signed off April 18th.    GI: GI prophylaxis, Nutrition attending f/u appreciated, tube feedings changed to gradually increasing with added Jevity for enhanced Glycemic coverage, timed with TID insulin lispro  f/u FG glucose per routine s/p diet change, adjust Lispro sliding scale if necessary    RENAL: HD today as per renal TTS. Phosphorous level added on to am labs, f/u     INFECTIOUS DISEASE: off abx, afebrile    HEMATOLOGICAL:  DVT prophylaxis: c/w heparin drip PTT target 50-55 (PTT currently 60.2, ok per CCU attending)  H/H stable  c/w daily monitoring CBC and twice daily PTT    MUSCULOSKELETAL:   b/l rib fractures from fall--> no acute intervention indicated-->pain control PRN  Right intertrochanteric fracture s/p intermedullary rodding by Ortho--> Ortho recalled this am, dressing and wound vac changed  Odontoid Fracture--> c/w neck brace 8 weeks per NeuroSgx    ENDOCRINE:  Follow up FS.  c/w sliding scale Lispro for now, Meals optimized for enhanced glycemic control, Nutrition attending interventions appreciated    Dispo: pending transfer to [facility]

## 2021-04-27 NOTE — PROGRESS NOTE ADULT - ASSESSMENT
Impression:  ARF   cardiac arrest unknown etiology   arrythmia V tach ./ afib s/p AICD   multiple fx , hip, humerus , neck   shock ?? cardiogenic   seizure   HD   left arm dvt   retroperitoneal drip     PLAN:    CNS: sedation as needed     HEENT: oral care     PULMONARY:  TP as needed       CARDIOVASCULAR: follow EP / ct surgery   cardiology follow up   follow ep is can resume metoprolol     GI: GI prophylaxis.  peg feed     RENAL: HD as per renal     INFECTIOUS DISEASE: off abx       HEMATOLOGICAL:  DVT prophylaxis.  heparin drip with bolus target ptt 50-55  follow h/h     ENDOCRINE:  Follow up FS.  Insulin protocol if needed.  follow orthopedics     pending transfer to [facility]  d/c tlc

## 2021-04-27 NOTE — CHART NOTE - NSCHARTNOTEFT_GEN_A_CORE
Nurse informed me at 5:50pm that Heparin needed to be held for 2 hours before removing the IJ. 4pm PTT @ 200, Instructed nurse to await 8pm PTT result and to consult provider before restarting pt on Heparin. Heparin held @ 6pm    Plan  F/U 8pm PTT

## 2021-04-27 NOTE — PROGRESS NOTE ADULT - ATTENDING COMMENTS
Pt. seen/ examined  Labs/ vitals reviewed  Discussed w/ ICU team - requesting dedicated shoulder XR  Prevene functional, no trainage in tubing/ reservoir  Will follow Pt. seen/ examined  Labs/ vitals reviewed  Discussed w/ ICU team - requesting dedicated shoulder XR  Prevena functional, no drainage in tubing/ reservoir  Will follow

## 2021-04-27 NOTE — PROGRESS NOTE ADULT - SUBJECTIVE AND OBJECTIVE BOX
Patient is a 70y old  Female who presents with a chief complaint of Cardiac arrest (23 Apr 2021 12:22)      Over Night Events:  Patient seen and examined.   still of vent been doing well     ROS:  See HPI    PHYSICAL EXAM    ICU Vital Signs Last 24 Hrs  T(C): 36.7 (27 Apr 2021 08:00), Max: 37.1 (26 Apr 2021 16:00)  T(F): 98 (27 Apr 2021 08:00), Max: 98.7 (26 Apr 2021 16:00)  HR: 112 (27 Apr 2021 08:00) (92 - 120)  BP: 106/65 (27 Apr 2021 08:00) (99/67 - 113/66)  BP(mean): 81 (27 Apr 2021 08:00) (70 - 87)  ABP: --  ABP(mean): --  RR: 80 (27 Apr 2021 08:00) (17 - 123)  SpO2: 100% (27 Apr 2021 08:00) (96% - 100%)      General:awake follow command   HEENT:       neck collar          Lymph Nodes: NO cervical LN   Lungs: Bilateral BS  Cardiovascular: Regular   Abdomen: Soft, Positive BS  Extremities: No clubbing   Skin: warm   Neurological: no focal   Musculoskeletal: move all ext     I&O's Detail    26 Apr 2021 07:01  -  27 Apr 2021 07:00  --------------------------------------------------------  IN:    Enteral Tube Flush: 370 mL    Glucerna: 480 mL    Heparin: 143 mL    Heparin: 96 mL    IV PiggyBack: 150 mL    Peptamen A.F.: 450 mL    sodium chloride 0.9%: 975 mL  Total IN: 2664 mL    OUT:    Indwelling Catheter - Urethral (mL): 10 mL  Total OUT: 10 mL    Total NET: 2654 mL      27 Apr 2021 07:01  -  27 Apr 2021 09:00  --------------------------------------------------------  IN:    Heparin: 22 mL  Total IN: 22 mL    OUT:  Total OUT: 0 mL    Total NET: 22 mL          LABS:                          7.7    11.26 )-----------( 286      ( 27 Apr 2021 04:31 )             26.5         27 Apr 2021 04:31    143    |  106    |  43     ----------------------------<  154    4.4     |  26     |  3.5      Ca    8.5        27 Apr 2021 04:31  Mg     2.3       26 Apr 2021 04:30    TPro  5.2    /  Alb  2.6    /  TBili  0.3    /  DBili  x      /  AST  12     /  ALT  <5     /  AlkPhos  164    27 Apr 2021 04:31  Amylase x     lipase x                                                 PTT - ( 27 Apr 2021 04:31 )  PTT:60.2 sec                                                                                                                                                                                        MEDICATIONS  (STANDING):  aMIOdarone    Tablet 200 milliGRAM(s) Oral daily  atorvastatin 40 milliGRAM(s) Oral at bedtime  BACItracin   Ointment 1 Application(s) Topical every 12 hours  BACItracin   Ointment 1 Application(s) Topical every 12 hours  chlorhexidine 0.12% Liquid 15 milliLiter(s) Oral Mucosa every 12 hours  collagenase Ointment 1 Application(s) Topical every 12 hours  dextrose 40% Gel 15 Gram(s) Oral once  dextrose 5%. 1000 milliLiter(s) (50 mL/Hr) IV Continuous <Continuous>  dextrose 5%. 1000 milliLiter(s) (100 mL/Hr) IV Continuous <Continuous>  dextrose 50% Injectable 25 Gram(s) IV Push once  dextrose 50% Injectable 12.5 Gram(s) IV Push once  dextrose 50% Injectable 25 Gram(s) IV Push once  doxercalciferol Injectable 2 MICROGram(s) IV Push <User Schedule>  epoetin mana-epbx (RETACRIT) Injectable 49546 Unit(s) IV Push <User Schedule>  estrogens  Cream 1 Gram(s) Vaginal at bedtime  glucagon  Injectable 1 milliGRAM(s) IntraMuscular once  heparin  Infusion 1100 Unit(s)/Hr (11 mL/Hr) IV Continuous <Continuous>  insulin lispro (ADMELOG) corrective regimen sliding scale   SubCutaneous Before meals and at bedtime  lactulose Syrup 15 Gram(s) Oral three times a day  levETIRAcetam  IVPB 375 milliGRAM(s) IV Intermittent every 12 hours  levETIRAcetam  IVPB 250 milliGRAM(s) IV Intermittent <User Schedule>  midodrine 5 milliGRAM(s) Oral every 8 hours  pantoprazole  Injectable 40 milliGRAM(s) IV Push daily  polyethylene glycol 3350 17 Gram(s) Oral at bedtime  senna 2 Tablet(s) Oral at bedtime  sertraline 50 milliGRAM(s) Oral daily  valproic  acid Syrup 250 milliGRAM(s) Oral two times a day    MEDICATIONS  (PRN):  acetaminophen   Tablet .. 650 milliGRAM(s) Oral every 6 hours PRN Temp greater or equal to 38C (100.4F)  HYDROmorphone  Injectable 0.5 milliGRAM(s) IV Push every 4 hours PRN Severe Pain (7 - 10)          Xrays:  TLC:  OG:  ET tube:                                                                                    stable    ECHO:  [facility] ICU:

## 2021-04-27 NOTE — PROGRESS NOTE ADULT - SUBJECTIVE AND OBJECTIVE BOX
Currently admitted to medicine with the primary diagnosis of Cardiopulmonary arrest, now s/p Trach and PEG placement pending [facility].    Hospital day 26. No acute events overnight. Ptn awake and oriented of sedation, following commands. Denies discomfort at this time neither from Trach/PEG site nor elsewhere. ROS otherwise unchanged.       PAST MEDICAL & SURGICAL HISTORY  Diabetes    Congestive heart failure (CHF)    Pleural effusion due to congestive heart failure    End stage renal disease  on dialysis Mon, Wed, Fr    Uterine prolapse    Chronic indwelling Castellon catheter    History of repair of hip fracture  2019    S/P CABG (coronary artery bypass graft)  11/2019    History of thoracentesis    Chronic indwelling Castellon catheter    ALLERGIES:  No Known Allergies    MEDICATIONS:  STANDING MEDICATIONS  aMIOdarone    Tablet 200 milliGRAM(s) Oral daily  atorvastatin 40 milliGRAM(s) Oral at bedtime  BACItracin   Ointment 1 Application(s) Topical every 12 hours  BACItracin   Ointment 1 Application(s) Topical every 12 hours  chlorhexidine 0.12% Liquid 15 milliLiter(s) Oral Mucosa every 12 hours  collagenase Ointment 1 Application(s) Topical every 12 hours  dextrose 40% Gel 15 Gram(s) Oral once  dextrose 5%. 1000 milliLiter(s) IV Continuous <Continuous>  dextrose 5%. 1000 milliLiter(s) IV Continuous <Continuous>  dextrose 50% Injectable 25 Gram(s) IV Push once  dextrose 50% Injectable 12.5 Gram(s) IV Push once  dextrose 50% Injectable 25 Gram(s) IV Push once  doxercalciferol Injectable 2 MICROGram(s) IV Push <User Schedule>  epoetin mana-epbx (RETACRIT) Injectable 79636 Unit(s) IV Push <User Schedule>  estrogens  Cream 1 Gram(s) Vaginal at bedtime  glucagon  Injectable 1 milliGRAM(s) IntraMuscular once  heparin  Infusion 1100 Unit(s)/Hr IV Continuous <Continuous>  insulin lispro (ADMELOG) corrective regimen sliding scale   SubCutaneous Before meals and at bedtime  lactulose Syrup 15 Gram(s) Oral three times a day  levETIRAcetam  IVPB 375 milliGRAM(s) IV Intermittent every 12 hours  levETIRAcetam  IVPB 250 milliGRAM(s) IV Intermittent <User Schedule>  midodrine 5 milliGRAM(s) Oral every 8 hours  pantoprazole  Injectable 40 milliGRAM(s) IV Push daily  polyethylene glycol 3350 17 Gram(s) Oral at bedtime  senna 2 Tablet(s) Oral at bedtime  sertraline 50 milliGRAM(s) Oral daily  valproic  acid Syrup 250 milliGRAM(s) Oral two times a day    PRN MEDICATIONS  acetaminophen   Tablet .. 650 milliGRAM(s) Oral every 6 hours PRN  HYDROmorphone  Injectable 0.5 milliGRAM(s) IV Push every 4 hours PRN    VITALS:   T(F): 98  HR: 110  BP: 106/65  RR: 110  SpO2: 100%    LABS:                        7.7    11.26 )-----------( 286      ( 27 Apr 2021 04:31 )             26.5     04-27    143  |  106  |  43<H>  ----------------------------<  154<H>  4.4   |  26  |  3.5<H>    Ca    8.5      27 Apr 2021 04:31  Mg     2.3     04-26    TPro  5.2<L>  /  Alb  2.6<L>  /  TBili  0.3  /  DBili  x   /  AST  12  /  ALT  <5  /  AlkPhos  164<H>  04-27    PTT - ( 27 Apr 2021 04:31 )  PTT:60.2 sec    PHYSICAL EXAM:  GEN: No acute distress, Trach tube in place   LUNGS: Clear to auscultation bilaterally   HEART: Regular  ABD: Soft, non-tender, non-distended. PEG tube entry site warm and dry with no discharge/erthyema  EXT: NC/NC/NE/2+PP/GEORGE/Skin Intact.   NEURO: follows commands, not on any sedation. Unable to assess AO status d/t Trach    Intravenous access: Central IJ (7 days old)  PEG tube: no discharge around entry site, denies discomfort from area  Indwelling Urethral Catheter:     Connect To:  Straight Drainage/Gravity    Indication:  Urine Output Monitoring in Critically Ill

## 2021-04-27 NOTE — CHART NOTE - NSCHARTNOTESELECT_GEN_ALL_CORE
Cardiology post cath note/Event Note
Event Note
Canceled Surgery/Event Note
Event Note
Neurosurgery/Event Note
Neurosurgery/Event Note
OBGYN/Event Note
PACU/ CCU admission note
f/u/Event Note

## 2021-04-27 NOTE — PROGRESS NOTE ADULT - ASSESSMENT
1. Odontoid fracture. Has neck collar. Neurosurgery following.  2. R hip / R humerus fracture. Ortho following. S/P ORIF.  3. ESRD on HD TTS. HD today: 3 hours, opti 160 dialyzer, 2K bath, 2L UF.  4. Anemia. EPO with HD.  5. Secondary hyperparathyroidism. Hectorol with HD.

## 2021-04-27 NOTE — PROGRESS NOTE ADULT - SUBJECTIVE AND OBJECTIVE BOX
ORTHO PROGRESS NOTE    Interval History:  POD #12 s/p R femur ORIF  Trach, C-collar  Proximal Prevena dressing changed  Aquacell clean    Vital Signs Last 24 Hrs  T(C): 36.7 (27 Apr 2021 08:00), Max: 37.1 (26 Apr 2021 16:00)  T(F): 98 (27 Apr 2021 08:00), Max: 98.7 (26 Apr 2021 16:00)  HR: 110 (27 Apr 2021 09:00) (92 - 120)  BP: 106/65 (27 Apr 2021 08:00) (99/67 - 113/66)  BP(mean): 81 (27 Apr 2021 08:00) (70 - 87)  RR: 110 (27 Apr 2021 09:00) (17 - 123)  SpO2: 100% (27 Apr 2021 09:00) (96% - 100%)    Physical Exam:  Proximal Prevena dressing changed, seal confirmed, functioning  Incisions c/d/i  No drainage, no dehiscence  Aquacell clean, intact  CR<2sec, palpable pulses                        7.7    11.26 )-----------( 286      ( 27 Apr 2021 04:31 )             26.5    143  |  106  |  43<H>  ----------------------------<  154<H>  4.4   |  26  |  3.5<H>    Ca    8.5      27 Apr 2021 04:31  Mg     2.3     04-26  TPro  5.2<L>  /  Alb  2.6<L>  /  TBili  0.3  /  DBili  x   /  AST  12  /  ALT  <5  /  AlkPhos  164<H>  04-27  PTT - ( 27 Apr 2021 04:31 )  PTT:60.2 sec    A/P: 70F POD #12 s/p right hip ORIF. Right proximal humerus fracture being treated non-operatively.    WBAT on RLE  Continue to monitor dressings and change as needed  NWB RUE (proximal humerus fracture)  DVT ppx: SCD, Hep gtt  Pain control  Home medications: resume  Continue trend labs: Transfuse PRN  Rest of management per primary  Dispo: Pending PT recommendations

## 2021-04-27 NOTE — PROGRESS NOTE ADULT - ATTENDING SUPERVISION STATEMENT
ACP
Resident
ACP
ACP
Resident
ACP
Resident
Resident/Fellow
Resident
Resident/Fellow
ACP
ACP
Resident/Fellow/Student
ACP
ACP

## 2021-04-28 NOTE — DISCHARGE NOTE PROVIDER - HOSPITAL COURSE
69 yo F with PMH of CAD s/p CABG (November 2019), HFrEF (EF 20%), AS, ESRD (MWF), bladder prolapse requiring chronic Castellon, Diabetes Type 2 on insulin presents from home s/p mechanical fall. When the history was taken in the emergency department, she The denied any dizziness/ palpitations/ aura/vertigo. She reported non-radiating R shoulder pain and non-radiating R hip pain on arrival. She was found to have R humeral fracture / R hip fracture / non displaced odontoid fracture/ acute rib fractures/ ? T6 vertebral fracture.     In the emergency department, while the patient was receiving trauma work up she was found pulseless by a PCA in the room ( she was not on the monitor at that time) and had a cardiac arrest. ROSC was achieved after 2 minutes of CPR (1 epinephrine was given and 1 calcium chloride iv). Patient was intubated and sedated. EKG showed RBBB with wide complex tachycardia. Per family at the bedside, the patient had her last hemodialysis yesterday and has been relatively non compliant with her medications and medical care at home. Unable to obtain ros on admision as patient was sedated.     On admission:  T(C): 35 , HR: 60, BP: 158/67, RR: 20, SpO2: 99% vented  Labs: d-dimer ~ 4700, p-bnp 70,000, trop (0.20 <--0.17)  ABG pH, Arterial: 7.45, CO2: 36, Arterial O2: 63 , HCO3: 25 , Base Excess: 1.4 , SaO2: 94      CTH (-)       s/p LHC (4/9) - Patent LIMA and SVG grafts, Transaortic gradient ~ 10-15mmHg. Trops .22-->.24 -->.21-->.34 (4/4/21). CTA negative for PE. Lower extremity duplex negative for DVT( 4/6/21). s/p BIV ICD placement. EP interrogation no events   Ptn was found to have R hip fracture on admission and underwent Patient is a 70y year old Female with right hip fracture R hip ORIF and returned intubated and sedated in the CCU  Ptn also found to have cervical spine fracture on admission with Hard cervical collar placed by neurosurgery team on April 4th. Over course of admission ptn was maintained on hard cervical collar given acute C2 fracture.  .    Burn team followed ptn during admission for pressure ulcers to arms and neck and shoulder  Nephrlolgy team followed ptn throughout admission for ESRD and ptn received TID HD TTS. ( 3 hours, opti 160 dialyzer, 3K bath, 2L UF. For anemia received EPO with HD. For Secondary hyperparathyroidism. Hectorol with HD.  Gynecology and URology teams monitored ptn over admission for her stage 4 uterine prolapse, which remained stable over course of admission with no acute complications no change to urination or defecation      By hospital week 2 ptn underwent planned tracheostomy 4/21 by CT surgery team and PEG tube placement due to prolongued intubation complicated by pneumonia over admission (s/p course of abx as per ID management) and unsuccessful awakening/weaning trials, prologued NG/parenteral nutrition administration necessitating PEG tube      Outpatient Plan:    Patient is to maintain hard cervical collar immobilization for approximately 4-6 weeks after placement (remove soetime between May 4th and 18th) only if CT scan at the time shows evidence of healing. In interim period if there is any AMS/ paralysis, obtain stat CT head/ cervical spine    Patient is to follow up with EP team next month (Dr Solo) for continued management of AICD device.  Patient's metoiprolol discontinued over course of admission despite occasional tachycardia to 120's d/t low blood pressure. Will be discharged off beta blockers for now, can be reassessed by EP team as outpatient    Ptn received placement at [facility] facility 4/28th with capabilities for HD, maintence of heparin drip and care of trach'd patient

## 2021-04-28 NOTE — PROGRESS NOTE ADULT - SUBJECTIVE AND OBJECTIVE BOX
ORTHO PROGRESS NOTE    Interval History:  POD #13 s/p R femur ORIF  Trach, C-collar  Proximal Prevena functioning, sealed, changed 4/27.  Aquacell clean    Vital Signs Last 24 Hrs  T(C): 36.9 (28 Apr 2021 04:00), Max: 37.7 (27 Apr 2021 12:00)  T(F): 98.4 (28 Apr 2021 04:00), Max: 99.9 (27 Apr 2021 12:00)  HR: 112 (28 Apr 2021 04:00) (100 - 126)  BP: 124/68 (28 Apr 2021 04:00) (98/56 - 132/80)  BP(mean): 87 (28 Apr 2021 04:00) (70 - 101)  RR: 18 (28 Apr 2021 04:00) (18 - 110)  SpO2: 100% (28 Apr 2021 04:00) (98% - 100%)    Physical Exam:   Proximal Prevena dressing seal confirmed, functioning  Incisions c/d/i  No drainage, no dehiscence  Aquacell clean, intact  CR<2sec, palpable pulses                        8.6    11.34 )-----------( 278      ( 28 Apr 2021 04:36 )             29.7     144  |  106  |  30<H>  ----------------------------<  123<H>  4.6   |  25  |  2.6<H>    Ca    8.7      28 Apr 2021 04:36  Mg     2.4     04-28  TPro  5.5<L>  /  Alb  2.7<L>  /  TBili  0.4  /  DBili  x   /  AST  12  /  ALT  <5  /  AlkPhos  159<H>  04-28  PTT - ( 28 Apr 2021 04:36 )  PTT:48.0 sec    A/P: 70F POD #13 s/p right hip ORIF. Right proximal humerus fracture being treated non-operatively.    Repeat R shoulder XR  WBAT on RLE  Continue to monitor dressings and change as needed  NWB RUE (proximal humerus fracture)  DVT ppx: SCD, Hep gtt  Pain control  Home medications: resume  Continue trend labs: Transfuse PRN  Rest of management per primary  Dispo: Pending PT recommendations

## 2021-04-28 NOTE — PROGRESS NOTE ADULT - SUBJECTIVE AND OBJECTIVE BOX
Patient is a 70y old  Female who presents with a chief complaint of Cardiac arrest (23 Apr 2021 12:22)  pt seen and evaluated   tube feed on hold pt is for cat scan      ICU Vital Signs Last 24 Hrs  T(C): 36.7 (28 Apr 2021 08:00), Max: 37.2 (27 Apr 2021 16:00)  T(F): 98 (28 Apr 2021 08:00), Max: 98.9 (27 Apr 2021 16:00)  HR: 108 (28 Apr 2021 12:00) (100 - 126)  BP: 105/63 (28 Apr 2021 12:00) (96/62 - 132/80)  BP(mean): 77 (28 Apr 2021 12:00) (71 - 97)  RR: 98 (28 Apr 2021 10:00) (18 - 104)  SpO2: 100% (28 Apr 2021 12:00) (98% - 100%)      Drug Dosing Weight  Height (cm): 154.9 (22 Apr 2021 17:50)  Weight (kg): 68.8 (23 Apr 2021 02:39)  BMI (kg/m2): 28.7 (23 Apr 2021 02:39)  BSA (m2): 1.68 (23 Apr 2021 02:39)  27 Apr 2021 07:01  -  28 Apr 2021 07:00  --------------------------------------------------------  IN:    Enteral Tube Flush: 400 mL    Glucerna: 1080 mL    Heparin: 188 mL    IV PiggyBack: 200 mL  Total IN: 1868 mL    OUT:    Indwelling Catheter - Urethral (mL): 20 mL    Other (mL): 2000 mL  Total OUT: 2020 mL    Total NET: -152 mL      28 Apr 2021 07:01  -  28 Apr 2021 13:28  --------------------------------------------------------  IN:    Heparin: 48 mL  Total IN: 48 mL    OUT:  Total OUT: 0 mL    Total NET: 48 mL    PHYSICAL EXAM:  Constitutional:  remain vented   chest + chest tube in place  Gastrointestinal:  soft n/d  MEDICATIONS  (STANDING):  aMIOdarone    Tablet 200 milliGRAM(s) Oral daily  atorvastatin 40 milliGRAM(s) Oral at bedtime  BACItracin   Ointment 1 Application(s) Topical every 12 hours  BACItracin   Ointment 1 Application(s) Topical every 12 hours  chlorhexidine 0.12% Liquid 15 milliLiter(s) Oral Mucosa every 12 hours  collagenase Ointment 1 Application(s) Topical every 12 hours  dextrose 40% Gel 15 Gram(s) Oral once  dextrose 5%. 1000 milliLiter(s) (50 mL/Hr) IV Continuous <Continuous>  dextrose 5%. 1000 milliLiter(s) (100 mL/Hr) IV Continuous <Continuous>  dextrose 50% Injectable 25 Gram(s) IV Push once  dextrose 50% Injectable 12.5 Gram(s) IV Push once  dextrose 50% Injectable 25 Gram(s) IV Push once  doxercalciferol Injectable 2 MICROGram(s) IV Push <User Schedule>  epoetin mana-epbx (RETACRIT) Injectable 42398 Unit(s) IV Push <User Schedule>  estrogens  Cream 1 Gram(s) Vaginal at bedtime  glucagon  Injectable 1 milliGRAM(s) IntraMuscular once  heparin  Infusion 1100 Unit(s)/Hr (12 mL/Hr) IV Continuous <Continuous>  insulin lispro (ADMELOG) corrective regimen sliding scale   SubCutaneous Before meals and at bedtime  lactulose Syrup 15 Gram(s) Oral three times a day  levETIRAcetam  IVPB 375 milliGRAM(s) IV Intermittent every 12 hours  levETIRAcetam  IVPB 250 milliGRAM(s) IV Intermittent <User Schedule>  midodrine 5 milliGRAM(s) Oral every 8 hours  pantoprazole  Injectable 40 milliGRAM(s) IV Push daily  polyethylene glycol 3350 17 Gram(s) Oral at bedtime  senna 2 Tablet(s) Oral at bedtime  sertraline 50 milliGRAM(s) Oral daily  valproic  acid Syrup 250 milliGRAM(s) Oral two times a day      Diet, NPO with Tube Feed:   Tube Feeding Modality: Gastrostomy  Glucerna 1.2 Donnie  Total Volume for 24 Hours (mL): 720  Bolus  Total Volume of Bolus (mL):  240  Tube Feed Frequency: Every 8 hours   Tube Feed Start Time: 14:00  Bolus Feed Rate (mL per Hour): 500   Bolus Feed Duration (in Hours): 0.5  Bolus Feed Instructions:  plesr give the Chris twice a day  on 4/27 increase feeds to 360 ml x 3  fees/d  check fingerstick glucose and give any insulin BEFORE feeds only  Free Water Flush Instructions:  50 ml water by syringe push before and after each feeding  Chris(7 Gm Arginine/7 Gm Glut/1.2 Gm HMB     Qty per Day:  2 (04-26-21 @ 13:19)      LABS  04-28    144  |  106  |  30<H>  ----------------------------<  123<H>  4.6   |  25  |  2.6<H>    Ca    8.7      28 Apr 2021 04:36  Mg     2.4     04-28    TPro  5.5<L>  /  Alb  2.7<L>  /  TBili  0.4  /  DBili  x   /  AST  12  /  ALT  <5  /  AlkPhos  159<H>  04-28                          8.6    11.34 )-----------( 278      ( 28 Apr 2021 04:36 )             29.7     CAPILLARY BLOOD GLUCOSE  POCT Blood Glucose.: 173 mg/dL (28 Apr 2021 12:57)  POCT Blood Glucose.: 126 mg/dL (28 Apr 2021 05:51)  POCT Blood Glucose.: 126 mg/dL (27 Apr 2021 22:30)  POCT Blood Glucose.: 161 mg/dL (27 Apr 2021 15:26)   RADIOLOGY STUDIES  < from: Xray Chest 1 View- PORTABLE-Routine (Xray Chest 1 View- PORTABLE-Routine in AM.) (04.28.21 @ 06:18) >  IMPRESSION:  1 of the previously seen right IJ catheters have been removed, otherwise no change.   Patient is a 70y old  Female who presents with a chief complaint of Cardiac arrest (23 Apr 2021 12:22)  pt seen and evaluated   remain vented via trach  on peg  tube feed   medical f/u note    ICU Vital Signs Last 24 Hrs  T(C): 36.7 (28 Apr 2021 08:00), Max: 37.2 (27 Apr 2021 16:00)  T(F): 98 (28 Apr 2021 08:00), Max: 98.9 (27 Apr 2021 16:00)  HR: 108 (28 Apr 2021 12:00) (100 - 126)  BP: 105/63 (28 Apr 2021 12:00) (96/62 - 132/80)  BP(mean): 77 (28 Apr 2021 12:00) (71 - 97)  RR: 98 (28 Apr 2021 10:00) (18 - 104)  SpO2: 100% (28 Apr 2021 12:00) (98% - 100%)      Drug Dosing Weight  Height (cm): 154.9 (22 Apr 2021 17:50)  Weight (kg): 68.8 (23 Apr 2021 02:39)  BMI (kg/m2): 28.7 (23 Apr 2021 02:39)  BSA (m2): 1.68 (23 Apr 2021 02:39)  27 Apr 2021 07:01  -  28 Apr 2021 07:00  --------------------------------------------------------  IN:    Enteral Tube Flush: 400 mL    Glucerna: 1080 mL    Heparin: 188 mL    IV PiggyBack: 200 mL  Total IN: 1868 mL    OUT:    Indwelling Catheter - Urethral (mL): 20 mL    Other (mL): 2000 mL  Total OUT: 2020 mL    Total NET: -152 mL      28 Apr 2021 07:01  -  28 Apr 2021 13:28  --------------------------------------------------------  IN:    Heparin: 48 mL  Total IN: 48 mL    OUT:  Total OUT: 0 mL    Total NET: 48 mL    PHYSICAL EXAM:  Constitutional:  remain vented   Gastrointestinal:  soft +peg tube in place  MEDICATIONS  (STANDING):  aMIOdarone    Tablet 200 milliGRAM(s) Oral daily  atorvastatin 40 milliGRAM(s) Oral at bedtime  BACItracin   Ointment 1 Application(s) Topical every 12 hours  BACItracin   Ointment 1 Application(s) Topical every 12 hours  chlorhexidine 0.12% Liquid 15 milliLiter(s) Oral Mucosa every 12 hours  collagenase Ointment 1 Application(s) Topical every 12 hours  dextrose 40% Gel 15 Gram(s) Oral once  dextrose 5%. 1000 milliLiter(s) (50 mL/Hr) IV Continuous <Continuous>  dextrose 5%. 1000 milliLiter(s) (100 mL/Hr) IV Continuous <Continuous>  dextrose 50% Injectable 25 Gram(s) IV Push once  dextrose 50% Injectable 12.5 Gram(s) IV Push once  dextrose 50% Injectable 25 Gram(s) IV Push once  doxercalciferol Injectable 2 MICROGram(s) IV Push <User Schedule>  epoetin mana-epbx (RETACRIT) Injectable 08359 Unit(s) IV Push <User Schedule>  estrogens  Cream 1 Gram(s) Vaginal at bedtime  glucagon  Injectable 1 milliGRAM(s) IntraMuscular once  heparin  Infusion 1100 Unit(s)/Hr (12 mL/Hr) IV Continuous <Continuous>  insulin lispro (ADMELOG) corrective regimen sliding scale   SubCutaneous Before meals and at bedtime  lactulose Syrup 15 Gram(s) Oral three times a day  levETIRAcetam  IVPB 375 milliGRAM(s) IV Intermittent every 12 hours  levETIRAcetam  IVPB 250 milliGRAM(s) IV Intermittent <User Schedule>  midodrine 5 milliGRAM(s) Oral every 8 hours  pantoprazole  Injectable 40 milliGRAM(s) IV Push daily  polyethylene glycol 3350 17 Gram(s) Oral at bedtime  senna 2 Tablet(s) Oral at bedtime  sertraline 50 milliGRAM(s) Oral daily  valproic  acid Syrup 250 milliGRAM(s) Oral two times a day      Diet, NPO with Tube Feed:   Tube Feeding Modality: Gastrostomy  Glucerna 1.2 Donnie  Total Volume for 24 Hours (mL): 720  Bolus  Total Volume of Bolus (mL):  240  Tube Feed Frequency: Every 8 hours   Tube Feed Start Time: 14:00  Bolus Feed Rate (mL per Hour): 500   Bolus Feed Duration (in Hours): 0.5  Bolus Feed Instructions:  plesr give the Chris twice a day  on 4/27 increase feeds to 360 ml x 3  fees/d  check fingerstick glucose and give any insulin BEFORE feeds only  Free Water Flush Instructions:  50 ml water by syringe push before and after each feeding  Chris(7 Gm Arginine/7 Gm Glut/1.2 Gm HMB     Qty per Day:  2 (04-26-21 @ 13:19)      LABS  04-28    144  |  106  |  30<H>  ----------------------------<  123<H>  4.6   |  25  |  2.6<H>    Ca    8.7      28 Apr 2021 04:36  Mg     2.4     04-28    TPro  5.5<L>  /  Alb  2.7<L>  /  TBili  0.4  /  DBili  x   /  AST  12  /  ALT  <5  /  AlkPhos  159<H>  04-28                          8.6    11.34 )-----------( 278      ( 28 Apr 2021 04:36 )             29.7     CAPILLARY BLOOD GLUCOSE  POCT Blood Glucose.: 173 mg/dL (28 Apr 2021 12:57)  POCT Blood Glucose.: 126 mg/dL (28 Apr 2021 05:51)  POCT Blood Glucose.: 126 mg/dL (27 Apr 2021 22:30)  POCT Blood Glucose.: 161 mg/dL (27 Apr 2021 15:26)   RADIOLOGY STUDIES  < from: Xray Chest 1 View- PORTABLE-Routine (Xray Chest 1 View- PORTABLE-Routine in AM.) (04.28.21 @ 06:18) >  IMPRESSION:  1 of the previously seen right IJ catheters have been removed, otherwise no change.

## 2021-04-28 NOTE — PROGRESS NOTE ADULT - ASSESSMENT
1. Odontoid fracture. Has neck collar. Neurosurgery following.  2. R hip / R humerus fracture. Ortho following. S/P ORIF.  3. ESRD on HD TTS. HD tomorrow: 3 hours, opti 160 dialyzer, 2K bath, 2L UF.  4. Anemia. EPO with HD.  5. Secondary hyperparathyroidism. Hectorol with HD.

## 2021-04-28 NOTE — DISCHARGE NOTE PROVIDER - NSDCFUSCHEDAPPT_GEN_ALL_CORE_FT
GUS WILBURN ; 05/04/2021 ; formerly [facility]  GUS WILBURN ; 05/18/2021 ; [facility] [address] [facility] [place]  GUS WILBURN ; 07/14/2021 ; [facility] [address] [facility]

## 2021-04-28 NOTE — DISCHARGE NOTE PROVIDER - CARE PROVIDER_API CALL
[place] [address]  Phone: [phone]  Fax: [phone]  Established Patient  Follow Up Time: 1-3 days

## 2021-04-28 NOTE — DISCHARGE NOTE PROVIDER - NSDCCPCAREPLAN_GEN_ALL_CORE_FT
PRINCIPAL DISCHARGE DIAGNOSIS  Diagnosis: Cardiopulmonary arrest  Assessment and Plan of Treatment:   An implantable cardioverter defibrillator (ICD) is a small device that monitors your heart rate and rhythm. It was placed inside your chest due to your cardiac arrest episode which prompted this admission. It will be used to help prevent future life-threatening arrhythmias.  An ICD can give a shock to your heart to make it start beating again. It can also make your heart beat faster or slower.  Implantable Cardiac Defibrillator (ICD)     DISCHARGE INSTRUCTIONS:  Call your local emergency number (911 in the ) for any of the following:   •You have any of the following signs of a heart attack: ?Squeezing, pressure, or pain in your chest  ?You may also have any of the following: ?Discomfort or pain in your back, neck, jaw, stomach, or arm  ?Shortness of breath  ?Nausea or vomiting  ?Lightheadedness or a sudden cold sweat  •You become weak, dizzy, or faint.  •You feel your heart skip beats or beat very fast or slow, but you do not feel a shock from your ICD.  •You feel lightheaded, short of breath, and have chest pain.  •You cough up blood.  •You have trouble breathing.  Seek care immediately if:   •Your arm or leg feels warm, tender, and painful. It may look swollen and red.  •Your stitches or staples come apart.  •Blood soaks through your bandage.  •You feel more than 3 shocks in a row from your ICD.  Call your doctor or cardiologist if:   •You have a fever.  •You feel a shock from your ICD and feel fine afterward.  •Your feet or ankles swell.  •The skin around your stitches or staples is red, swollen, or draining pus or fluid.  •You have chills, a cough, and feel weak or achy.  •You are sad or anxious and find it hard to do your usual activities.  •You have questions or concerns about your condition or care.        SECONDARY DISCHARGE DIAGNOSES  Diagnosis: Hip fracture  Assessment and Plan of Treatment:   ORIF is surgery to fix a broken bone in your hip. A hip fracture is a break in the top of the femur or in the hip socket. The femur is the long bone in your thigh that attaches to your pelvis at the hip joint. Open reduction means the broken parts will be moved back into the right position. Internal fixation means the broken parts will be held together with hardware, such as screws or plates. You may also need an implant to replace your hip socket.  Internal Fixation Device     DISCHARGE INSTRUCTIONS:  Call your local emergency number (911 in the [place]) if:   •You have chest pain when you take a deep breath or cough.  •You suddenly feel lightheaded and short of breath.  •You cough up blood.  Seek care immediately if:   •Your leg feels warm, tender, and painful. It may look swollen and red.  •You fall.  •Blood soaks through your bandage.  •Your wound becomes swollen, red, or has pus coming out of it.  •Your incision comes apart.  Call you doctor or surgeon if:   •You have a fever of 100.5°F (38.1°C).  •You have questions or concerns about your condition or care.      Diagnosis: Humerus fracture  Assessment and Plan of Treatment:   An arm fracture is a crack or break in one or more of the bones in your arm. Your arm farcture was treated in hospital by the orthopedics team with a cloth sling, placed over the course of admission to help stabilize the affected arm socket while it heals. Prior to discharge, your arm positioning was evaluated by the orthopedics team with a 3-view x-ray to ensure proper placmeent and alignment.   DISCHARGE INSTRUCTIONS:  Seek care immediately if:   •The pain in your injured arm does not get better or gets worse, even after you rest and take medicine.  •Your injured arm, hand, or fingers feel numb.  •Your arm is swollen, red, and feels warm.  •Your skin over the fracture is swollen, cold, or pale.  •You cnnot move your arm, hand, or fingers.   Call your doctor if:   •You have a fever.  •Your slingbecomes wet, damaged, or comes off.  •You have questions or concerns about your injury, treatment, or care.  Medicines: You may need any of the following:   •NSAIDs, such as ibuprofen, help decrease swelling, pain, and fever. This medicine is available with or without a doctor's order. NSAIDs can cause stomach bleeding or kidney problems in certain people. If you take blood thinner medicine, always ask your healthcare provider if NSAIDs are safe for you. Always read the medicine label and follow directions.  •Acetaminophen decreases pain and fever. It is available without a doctor's order. Ask how much to take and how often to take it. Follow directions. Read the labels of all other medicines you are using to see if they also contain acetaminophen, or ask your doctor or pharmacist. Acetaminophen can cause liver damage if not taken correctly. Do not use more than 4 grams (4,000 milligrams) total of acetaminophen in one day.   •Prescription pain medicine may be given. Ask your healthcare provider how to take this medicine safely. Some prescription pain medicines contain acetaminophen. Do not take other medicines that contain acetaminophen without talking to your healthcare pro    Diagnosis: Urinary tract infection  Assessment and Plan of Treatment:     Diagnosis: Odontoid fracture  Assessment and Plan of Treatment:   Unstable cervical fractures may press on the spinal cord and damage it. This type of fracture creates a high risk of spinal cord damage. Damage to the spinal cord can cause loss of feeling and loss of movement (paralysis) from the neck down. During your admission, your cervical spine fracture was treated by the nueurology and neurosurgery teams with an immobile neck brace, which will be removed 4-6 weeks after placement if CT scan shows healing of the affected joint.  How is this treated?  •Using rigid neck support to keep your fracture from moving (immobilization).  Follow these instructions at home:  While you have your neck brace or cervical collar:   •Wear the brace or collar as told by your health care provider. Do not remove it unless told by the health care provider UNTIL AFTER YOUR FOLLOW-UP CT SCAN SHOWS HEALING OF THE AFFECTED JOINT.  Your orthopedic surgeon will confirm once the collar can safely be removed.   •Keep the neck brace or collar clean.   •If the neck brace or collar is not waterproof:  •Do not let it get wet.  •Cover it with a watertight covering when you take a bath or a shower.      Diagnosis: Pulmonary edema  Assessment and Plan of Treatment:

## 2021-04-28 NOTE — PROGRESS NOTE ADULT - SUBJECTIVE AND OBJECTIVE BOX
Patient is a 70y old  Female who presents with a chief complaint of Cardiac arrest (23 Apr 2021 12:22)      Over Night Events:  Patient seen and examined.   awake off vent BP stable on heparin drip    ROS:  See HPI    PHYSICAL EXAM    ICU Vital Signs Last 24 Hrs  T(C): 36.9 (28 Apr 2021 04:00), Max: 37.7 (27 Apr 2021 12:00)  T(F): 98.4 (28 Apr 2021 04:00), Max: 99.9 (27 Apr 2021 12:00)  HR: 108 (28 Apr 2021 06:00) (100 - 126)  BP: 96/62 (28 Apr 2021 06:00) (96/62 - 132/80)  BP(mean): 76 (28 Apr 2021 06:00) (70 - 101)  ABP: --  ABP(mean): --  RR: 21 (28 Apr 2021 06:00) (18 - 110)  SpO2: 100% (28 Apr 2021 06:00) (98% - 100%)      General: Awake   HEENT:    trach            Lymph Nodes: NO cervical LN   Lungs: Bilateral BS  Cardiovascular: Regular   Abdomen: Soft, Positive BS, peg tube   Extremities: No clubbing   Skin: warm   Neurological:  no focal   Musculoskeletal: move all ext     I&O's Detail    27 Apr 2021 07:01  -  28 Apr 2021 07:00  --------------------------------------------------------  IN:    Enteral Tube Flush: 400 mL    Glucerna: 1080 mL    Heparin: 188 mL    IV PiggyBack: 200 mL  Total IN: 1868 mL    OUT:    Indwelling Catheter - Urethral (mL): 20 mL    Other (mL): 2000 mL  Total OUT: 2020 mL    Total NET: -152 mL          LABS:                          8.6    11.34 )-----------( 278      ( 28 Apr 2021 04:36 )             29.7         28 Apr 2021 04:36    144    |  106    |  30     ----------------------------<  123    4.6     |  25     |  2.6      Ca    8.7        28 Apr 2021 04:36  Mg     2.4       28 Apr 2021 04:36    TPro  5.5    /  Alb  2.7    /  TBili  0.4    /  DBili  x      /  AST  12     /  ALT  <5     /  AlkPhos  159    28 Apr 2021 04:36  Amylase x     lipase x                                                 PTT - ( 28 Apr 2021 04:36 )  PTT:48.0 sec                                                                                                                                                Mode: standby  FiO2: 40                                          MEDICATIONS  (STANDING):  aMIOdarone    Tablet 200 milliGRAM(s) Oral daily  atorvastatin 40 milliGRAM(s) Oral at bedtime  BACItracin   Ointment 1 Application(s) Topical every 12 hours  BACItracin   Ointment 1 Application(s) Topical every 12 hours  chlorhexidine 0.12% Liquid 15 milliLiter(s) Oral Mucosa every 12 hours  collagenase Ointment 1 Application(s) Topical every 12 hours  dextrose 40% Gel 15 Gram(s) Oral once  dextrose 5%. 1000 milliLiter(s) (50 mL/Hr) IV Continuous <Continuous>  dextrose 5%. 1000 milliLiter(s) (100 mL/Hr) IV Continuous <Continuous>  dextrose 50% Injectable 25 Gram(s) IV Push once  dextrose 50% Injectable 12.5 Gram(s) IV Push once  dextrose 50% Injectable 25 Gram(s) IV Push once  doxercalciferol Injectable 2 MICROGram(s) IV Push <User Schedule>  epoetin mana-epbx (RETACRIT) Injectable 44263 Unit(s) IV Push <User Schedule>  estrogens  Cream 1 Gram(s) Vaginal at bedtime  glucagon  Injectable 1 milliGRAM(s) IntraMuscular once  heparin  Infusion 1100 Unit(s)/Hr (12 mL/Hr) IV Continuous <Continuous>  insulin lispro (ADMELOG) corrective regimen sliding scale   SubCutaneous Before meals and at bedtime  lactulose Syrup 15 Gram(s) Oral three times a day  levETIRAcetam  IVPB 375 milliGRAM(s) IV Intermittent every 12 hours  levETIRAcetam  IVPB 250 milliGRAM(s) IV Intermittent <User Schedule>  midodrine 5 milliGRAM(s) Oral every 8 hours  pantoprazole  Injectable 40 milliGRAM(s) IV Push daily  polyethylene glycol 3350 17 Gram(s) Oral at bedtime  senna 2 Tablet(s) Oral at bedtime  sertraline 50 milliGRAM(s) Oral daily  valproic  acid Syrup 250 milliGRAM(s) Oral two times a day    MEDICATIONS  (PRN):  acetaminophen   Tablet .. 650 milliGRAM(s) Oral every 6 hours PRN Temp greater or equal to 38C (100.4F)  HYDROmorphone  Injectable 0.5 milliGRAM(s) IV Push every 4 hours PRN Severe Pain (7 - 10)          Xrays:  TLC:  OG:  ET tube:                                                                                    system down    ECHO:  [facility] ICU:

## 2021-04-28 NOTE — PROGRESS NOTE ADULT - ASSESSMENT
Impression:  ARF   cardiac arrest unknown etiology   arrythmia V tach ./ afib s/p AICD   multiple fx , hip, humerus , neck   shock ?? cardiogenic   seizure   HD   left arm dvt   retroperitoneal drip     PLAN:    CNS: sedation as needed     HEENT: oral care     PULMONARY:  TP as needed       CARDIOVASCULAR: follow EP / ct surgery   cardiology follow up   follow ep is can resume metoprolol     GI: GI prophylaxis.  peg feed     RENAL: HD as per renal     INFECTIOUS DISEASE: off abx       HEMATOLOGICAL:  DVT prophylaxis.  heparin drip with bolus target ptt 50-55  follow h/h     ENDOCRINE:  Follow up FS.  Insulin protocol if needed.  follow orthopedics     pending transfer to [facility] when bed available

## 2021-04-28 NOTE — DISCHARGE NOTE PROVIDER - NSDCMRMEDTOKEN_GEN_ALL_CORE_FT
amiodarone 200 mg oral tablet: 1 tab(s) orally once a day  aspirin 81 mg oral tablet, chewable: 1 tab(s) orally once a day  atorvastatin 40 mg oral tablet: 1 tab(s) orally once a day  hydrALAZINE 25 mg oral tablet: 1 tab(s) orally 3 times a day, hold if SBP &lt;100  INSULIN LISPRO KWIKPEN  100 UNIT/ML SOPN:   Lantus Solostar Pen 100 units/mL subcutaneous solution: 5 unit(s) subcutaneous once a day (at bedtime); hold if glucose &lt;100   METOLAZONE  5 MG TABS: 1  orally once a day  METOPROLOL SUCCINATE ER  25 MG TB24: 1  orally 2 times a day  SERTRALINE HCL  50 MG TABS:   SEVELAMER CARBONATE 800MG TABLETS: TK 2 TS PO TID WITH MEALS  TORSEMIDE 20MG TABLETS: TK 3 TS PO BID  VITAMIN D2 [id number] (ERGO) CAP RX: TK 1 C PO WEEKLY   acetaminophen 325 mg oral tablet: 2 tab(s) orally every 6 hours, As needed, Temp greater or equal to 38C (100.4F)  amiodarone 200 mg oral tablet: 1 tab(s) orally once a day  amiodarone 200 mg oral tablet: 1 tab(s) orally once a day  aspirin 81 mg oral tablet, chewable: 1 tab(s) orally once a day  atorvastatin 40 mg oral tablet: 1 tab(s) orally once a day  bacitracin 500 units/g topical ointment: 1 application topically every 12 hours  collagenase 250 units/g topical ointment: 1 application topically every 12 hours  conjugated estrogens 0.625 mg/g vaginal cream with applicator:  vaginal   heparin: 53926 unit(s) intravenous once a day, continuous infusion at 12mL/hr  insulin lispro 100 units/mL injectable solution:  injectable   INSULIN LISPRO KWIKPEN  100 UNIT/ML SOPN:   lactulose 10 g/15 mL oral syrup: 22.5 milliliter(s) orally 3 times a day  Lantus Solostar Pen 100 units/mL subcutaneous solution: 5 unit(s) subcutaneous once a day (at bedtime); hold if glucose &lt;100   levETIRAcetam 1000 mg/100 mL-NaCl 0.75% intravenous solution: 25 milliliter(s) intravenous   levETIRAcetam 1500 mg/100 mL-NaCl 0.54% intravenous solution: 25 milliliter(s) intravenous every 12 hours  METOLAZONE  5 MG TABS: 1  orally once a day  midodrine 5 mg oral tablet: 1 tab(s) orally every 8 hours  senna oral tablet: 2 tab(s) orally once a day (at bedtime)  SERTRALINE HCL  50 MG TABS:   SEVELAMER CARBONATE 800MG TABLETS: TK 2 TS PO TID WITH MEALS  TORSEMIDE 20MG TABLETS: TK 3 TS PO BID  VITAMIN D2 [id number] (ERGO) CAP RX: TK 1 C PO WEEKLY

## 2021-04-28 NOTE — PROGRESS NOTE ADULT - NSICDXPILOT_GEN_ALL_CORE
[place]

## 2021-04-28 NOTE — PROGRESS NOTE ADULT - ASSESSMENT
ASSESSMENT/PLAN  - fall with multiple fractures  - cardiac arrest    - cardiogenic shock    - EF 20%  - resp failure/ vent dependence  - ESRD on HD  - DM     pt is npo now   when ook to restart tube feed   iv=crease tube feed to glucerna 1.2 at 360ml q8hrs  add 2 juves daily  check bmp/phos/mg and correct lytes ASSESSMENT/PLAN  - fall with multiple fractures  - cardiac arrest    - cardiogenic shock    - EF 20%  - resp failure/ vent dependence  - ESRD on HD  - DM     increase tube feed to glucerna 1.2 at 360ml q8hrs  continue with 2 kelly packet daily  check bmp/phos/mg and correct lytes

## 2021-05-18 ENCOUNTER — APPOINTMENT (OUTPATIENT)
Dept: CARDIOLOGY | Facility: CLINIC | Age: 71
End: 2021-05-18

## 2021-06-29 NOTE — PROCEDURAL SAFETY CHECKLIST WITH OR WITHOUT SEDATION - NSGUIDEWIRESREMOVEDSD_GEN_ALL_CORE
Chief complaint:   Chief Complaint   Patient presents with   • Follow-up     6 MONTHS        Vitals:  Visit Vitals  BP (!) 153/84 (BP Location: RUE - Right upper extremity, Patient Position: Sitting, Cuff Size: Regular)   Pulse (!) 106   Ht 5' 8\" (1.727 m)   Wt 98.3 kg   SpO2 98%   BMI 32.95 kg/m²       HISTORY OF PRESENT ILLNESS     HPI    Mr. Wells is here today for a follow-up visit.  We saw him last about six months ago.    He has a history of coronary artery disease and prior bypass surgery, LIMA graft to LAD, vein graft to obtuse marginal vein to the right coronary artery.  He has history of hypertension, treated sleep apnea, atrial flutter and prior ablation.    I had asked him to increase his metoprolol dose due to uncontrolled blood pressure at the last visit, with this he experience side effects varying from vertigo, fatigue, lightheadedness and cough among other things.  It has since been decreased to 12.5 mg daily at night.    His implanted loop monitor has shown some intermittent bradycardia.    His home pulse readings tend to be low, most recently in the 50s and high 40s.  His home blood pressures vary considerably from the 120s all the way up into the 150s.    He has some chronic dyspnea which is unchanged.  No chest discomfort.  No lower extremity edema.  He is not experiencing palpitations.    No orthopnea or PND.    He is making an effort to exercise regularly, usually about 6 times per week for at least 30 minutes on a recumbent bicycle.  Walking on a treadmill tends to bother his back.    His weight is down several kg compared to the last visit.    He is compliant with his CPAP therapy.    He was quite anxious about the echocardiogram, we discussed the results.    There is a chronic elevation of the right hemidiaphragm after his bypass surgery.      Other significant problems:  Patient Active Problem List    Diagnosis Date Noted   • Status post placement of implantable loop recorder 05/04/2021      Priority: Low   • Essential hypertension 05/28/2019     Priority: Low   • Atrial fibrillation (CMS/[facility]) 05/28/2019     Priority: Low   • Atrial flutter (CMS/[facility]) 05/28/2019     Priority: Low   • CAD (coronary artery disease) 04/30/2019     Priority: Low   • S/P CABG (coronary artery bypass graft) 04/30/2019     Priority: Low   • Iron deficiency 04/30/2019     Priority: Low   • BARRIE on CPAP 04/25/2019     Priority: Low   • Impaired fasting glucose 01/29/2019     Priority: Low   • Dyslipidemia 01/29/2019     Priority: Low   • CKD (chronic kidney disease) stage 2, GFR 60-89 ml/min 01/29/2019     Priority: Low   • Obesity (BMI 30-39.9) 01/07/2019     Priority: Low   • Hypersomnia with sleep apnea, unspecified 10/31/2013     Priority: Low       PAST MEDICAL, FAMILY AND SOCIAL HISTORY     Medications:  Current Outpatient Medications   Medication   • pravastatin (PRAVACHOL) 40 MG tablet   • GLUCOSAMINE-CHONDROITIN DS PO   • Coenzyme Q-10 200 MG Cap   • acetaminophen (TYLENOL) 325 MG tablet   • Multiple Vitamins-Minerals (VITAMIN - THERAPEUTIC MULTIVITAMINS W/MINERALS) Tab   • aspirin 81 MG tablet   • valsartan (DIOVAN) 80 MG tablet     No current facility-administered medications for this visit.     Facility-Administered Medications Ordered in Other Visits   Medication   • perflutren lipid microsphere (DEFINITY) injection 2 mL       Allergies:  ALLERGIES:   Allergen Reactions   • Penicillins HIVES       Past Medical  History/Surgeries:  Past Medical History:   Diagnosis Date   • Essential (primary) hypertension    • High cholesterol    • Shingles    • Sleep apnea        Past Surgical History:   Procedure Laterality Date   • Back surgery     • Colonoscopy  4/11/16    Vj ABDI   • Coronary artery bypass graft N/A 04/25/2019    LIMA-LAD,SVG-OM,SVG-RCA; Dr Rene [facility]reguin [facility]   • Hemorrhoidectomy external single column group     • Hernia repair         Family History:  History reviewed. No pertinent family  history.    Social History:  Social History     Tobacco Use   • Smoking status: Former Smoker     Types: Cigarettes     Quit date: 10/31/2003     Years since quittin.6   • Smokeless tobacco: Never Used   Substance Use Topics   • Alcohol use: Yes     Alcohol/week: 10.0 standard drinks     Types: 10 Glasses of wine per week     Comment: daily - glass of wine       REVIEW OF SYSTEMS     Review of Systems  Negative or described in history present illness  PHYSICAL EXAM     Physical Exam  Constitutional:       Appearance: Normal appearance. He is obese.   HENT:      Head: Normocephalic and atraumatic.      Mouth/Throat:      Comments: Wearing a mask  Eyes:      Extraocular Movements: Extraocular movements intact.   Neck:      Comments: Without bruit  Cardiovascular:      Comments: Regular, borderline tachycardic, normal heart sounds, no murmur gallop or rub, normal radial pulses  Pulmonary:      Effort: Pulmonary effort is normal.      Breath sounds: No wheezing or rales.      Comments: Slightly diminished in both bases  Abdominal:      Palpations: Abdomen is soft.   Musculoskeletal:         General: No swelling. Normal range of motion.      Cervical back: Neck supple.   Skin:     General: Skin is warm and dry.   Neurological:      General: No focal deficit present.      Mental Status: He is alert and oriented to person, place, and time.   Psychiatric:         Mood and Affect: Mood normal.         Behavior: Behavior normal.         ASSESSMENT/PLAN     72 year old male     1. Coronary artery disease - presentation in 2019 with unstable angina/non-ST elevation MI. Underwent three-vessel bypass surgery the same admission. EF was low normal with an inferior wall motion abnormality.    Echo today shows similar findings.  Exercising regularly, no angina or heart failure symptoms.  Lipids are at goal, they were last checked in 2020.      2. Hypertension -  above goal, change losartan to valsartan.  He will  continue to check at home, asked him to alert us if the readings remain elevated, we could increase the valsartan or add a low-dose of diuretic.     3. History of tobacco abuse - AAA screen negative, June 2019.      4. Atrial fib/flutter - on amiodarone, followed by EP.  Prior ablation.  No longer on anticoagulation.     Loop monitor monitored by electrophysiology - most recently some intermittent bradycardia and pauses noted.  He wants to stop the metoprolol, he is on a very low dose, I think this is reasonable.     5. Mildly dilated proximal ascending aorta -  not significantly changed on his transthoracic echocardiogram today, I reviewed the images personally.     We will plan to see him back in six months, sooner if needed.   done

## 2021-07-14 ENCOUNTER — APPOINTMENT (OUTPATIENT)
Dept: CARDIOLOGY | Facility: CLINIC | Age: 71
End: 2021-07-14

## 2021-11-17 NOTE — PROGRESS NOTE ADULT - SUBJECTIVE AND OBJECTIVE BOX
Requested rx pended Right leg distal lateral dressing saturated, so it was changed at bedside  Incision c/d/i, staples intact, no dehiscence, no drainage, no active bleeding  New aquacell applied

## 2021-12-01 NOTE — ASU PATIENT PROFILE, ADULT - ACCEPTABLE
[FreeTextEntry1] : Kimmy is a 71-year-old female here for evaluation.\par \par She is generally healthy.  She has a history of hyperlipidemia.  Her LDL was 140 in 9/20, though improved to 67 this year on Crestor.\par \par She was found to have an abnormal EKG at her primary doctor's office, with evidence of an anteroseptal infarct and was recommended to see a cardiologist.\par \par She reports walking almost every day, without issue.  She also does report some chest tightness during stress, and some dyspnea.  She has a history of varicose veins, though denies lower extremity swelling, orthopnea, PND, dizziness, lightheadedness, and near syncope.  She denies toxic habits.  There is no history of premature CAD in her family. 0

## 2022-02-15 NOTE — PATIENT PROFILE ADULT - NSPROEXTENSIONSOFSELF_GEN_A_NUR
---------------------  From: MARYANN FOSTER  To: [facility]  Sent: 11/10/2021 03:24 p.m. CST  Subject: RE: COVID Test Results  Please keep me updated.  [facility] requires the test results prior to my admission tomorrow at 9:15 am for scheduled surgery.  If I have to take another test, I don't have much time to make arrangements.    Thank you.  ---------------------  From: John REAGAN, Diana ROSALES (Phone Messages Pool ([id number]_[facility]))  To: MARYANN FOSTER  Sent: 11/10/2021 3:20:09 PM CST  Subject: RE: COVID Test Results  ???  Hi Maryann,  ???  Dr. Ryder is personally keeping an eye out for your COVID result. As of 20 minutes ago, there was no result yet.  ???  I'm sorry for the delay,  ???  Diana Lopez RN  ???  ???  ---------------------  From: MARYANN FOSTER  To: [facility]  Sent: 11/10/2021 02:55 p.m. CST  Subject: COVID Test Results  Hello:  I called earlier to inquire about the results of a COVID test on had administered on Monday, Nov. 8.  A nurse was going to look into it as she felt the results should be available by now.  Could you please confirm?  Thank you!Pt currently admitted at [facility] for surgery.   walker/dentures

## 2022-03-23 NOTE — PROGRESS NOTE ADULT - PROVIDER SPECIALTY LIST ADULT
[facility]  03/23/22  Patient: Jose E Greenfield  YOB: 1994  Medical Record Number: [id number]                                                                                  Non-Opioid Controlled Substance Agreement    This is an agreement between you and your provider regarding safe and appropriate use of controlled substances prescribed by your care team. Controlled substances are?medicines that can cause physical and mental dependence (abuse).     There are strict laws about having and using these medicines. We here at [facility] are  committed to working with you in your efforts to get better. To support you in this work, we'll help you schedule regular office appointments for medicine refills. If we must cancel or change your appointment for any reason, we'll make sure you have enough medicine to last until your next appointment.     As a Provider, I will:     Listen carefully to your concerns while treating you with respect.     Recommend a treatment plan that I believe is in your best interest and may involve therapies other than medicine.      Talk with you often about the possible benefits and the risk of harm of any medicine that we prescribe for you.    Assess the safety of this medicine and check how well it works.      Provide a plan on how to taper (discontinue or go off) using this medicine if the decision is made to stop its use.      ::  As a Patient, I understand controlled substances:       Are prescribed by my care provider to help me function or work and manage my condition(s).?    Are strong medicines and can cause serious side effects.       Need to be taken exactly as prescribed.?Combining controlled substances with certain medicines or chemicals (such as illegal drugs, alcohol, sedatives, sleeping pills, and benzodiazepines) can be dangerous or even fatal.? If I stop taking my medicines suddenly, I may have severe withdrawal symptoms.     The  Heart Failure risks, benefits, and side effects of these medicine(s) were explained to me. I agree that:    1. I will take part in other treatments as advised by my care team. This may be psychiatry or counseling, physical therapy, behavioral therapy, group treatment or a referral to specialist.    2. I will keep all my appointments and understand this is part of the monitoring of controlled substances.?My care team may require an office visit for EVERY controlled substance refill. If I miss appointments or don t follow instructions, my care team may stop my medicine    3. I will take my medicines as prescribed. I will not change the dose or schedule unless my care team tells me to. There will be no refills if I run out early.      4. I may be asked to come to the clinic and complete a urine drug test or complete a pill count. If I don t give a urine sample or participate in a pill count, the care team may stop my medicine.    5. I will only receive controlled substance prescriptions from this clinic. If I am treated by another provider, I will tell them that I am taking controlled substances and that I have a treatment agreement with this provider. I will inform my [facility] care team within one business day if I am given a prescription for any controlled substance by another healthcare provider. My [facility] care team can contact other providers and pharmacists about my use of any medicines.    6. It is up to me to make sure that I don't run out of my medicines on weekends or holidays.?If my care team is willing to refill my prescription without a visit, I must request refills only during office hours. Refills may take up to 3 business days to process. I will use one pharmacy to fill all my controlled substance prescriptions. I will notify the clinic about any changes to my insurance or medicine availability.    7. I am responsible for my prescriptions. If the medicine/prescription is lost, stolen or destroyed,  it will not be replaced.?I also agree not to share controlled substance medicines with anyone.     8. I am aware I should not use any illegal or recreational drugs. I agree not to drink alcohol unless my care team says I can.     9. If I enroll in the [place] Medical Cannabis program, I will tell my care team before my next refill.    10. I will tell my care team right away if I become pregnant, have a new medical problem treated outside of my regular clinic, or have a change in my medicines.     11. I understand that this medicine can affect my thinking, judgment and reaction time.? Alcohol and drugs affect the brain and body, which can affect the safety of my driving. Being under the influence of alcohol or drugs can affect my decision-making, behaviors, personal safety and the safety of others. Driving while impaired (DWI) can occur if a person is driving, operating or in physical control of a car, motorcycle, boat, snowmobile, ATV, motorbike, off-road vehicle or any other motor vehicle ([place] Statute 169A.20). I understand the risk if I choose to drive or operate any vehicle or machinery.    I understand that if I do not follow any of the conditions above, my prescriptions or treatment may be stopped or changed.   I agree that my provider, clinic care team and pharmacy may work with any city, state or federal law enforcement agency that investigates the misuse, sale or other diversion of my controlled medicine. I will allow my provider to discuss my care with, or share a copy of, this agreement with any other treating provider, pharmacy or emergency room where I receive care.     I have read this agreement and have asked questions about anything I did not understand.    ________________________________________________________  Patient Signature - Jose E Greenfield     ___________________                   Date     ________________________________________________________  Provider Signature - Linda Bach MD        ___________________                   Date     ________________________________________________________  Witness Signature (required if provider not present while patient signing)          ___________________                   Date

## 2022-06-28 NOTE — CONSULT NOTE ADULT - PROBLEM SELECTOR PROBLEM 1
Nutrition Support Nephrology Cardiology Intervent Radiology Gastroenterology Urinary retention with incomplete bladder emptying Palliative Care Internal Medicine

## 2022-07-18 NOTE — PRE-ANESTHESIA EVALUATION ADULT - NSATTENDATTESTRD_GEN_ALL_CORE
The patient has been re-examined and I agree with the above assessment or I updated with my findings. 4 weeks

## 2022-08-05 NOTE — PROGRESS NOTE ADULT - SUBJECTIVE AND OBJECTIVE BOX
Reason for Disposition    Minor head injury    Answer Assessment - Initial Assessment Questions  1. MECHANISM: Was having an emphyzema exacerbation and when going into the bathroom to get his inhaler, collapsed in the bathroom hitting his head on the stone floor.    2. ONSET: 8/3/22  3. NEUROLOGIC SYMPTOMS: 3-4 hours of loss of consciousness and remaining dizziness  4. MENTAL STATUS: A&O x3  5. LOCATION: Just above the temple on the right side.  6. SCALP APPEARANCE: No cuts  7. SIZE:N/A  8. PAIN: Residual mild pain  9. TETANUS: 10/31/11  10. OTHER SYMPTOMS: Dizziness, black eye, elbow scrape, rib pain  11. PREGNANCY: N/A    Protocols used: HEAD INJURY-A-OH    Nakul was seen in the ED at a hospital in [place] as the fall took place at his home there.  He was admitted for testing and observation and was discharged after an overnight stay.  All scans were clear and he is A&O x3.  Scheduled him for a hospital follow up with Dr. Ferrera on Monday 8/8/22 @ 9:40 am.  Encouraged Nakul should he begin to feel like his breathing is worsening to seek urgent care right away to avoid another low oxygen black out event.  AUGUSTO StephensN, RN, [facility]  08/05/22  9:44 AM     Patient is a 69y old  Female who presents with a chief complaint of sob (18 May 2020 20:56)        Over Night Events:  Did well overnight.  Off pressors.  No SOB at rest.          ROS:     All ROS are negative except HPI         PHYSICAL EXAM    ICU Vital Signs Last 24 Hrs  T(C): 36 (19 May 2020 04:00), Max: 37 (18 May 2020 08:00)  T(F): 96.8 (19 May 2020 04:00), Max: 98.6 (18 May 2020 08:00)  HR: 74 (19 May 2020 07:00) (74 - 100)  BP: 83/49 (19 May 2020 07:00) (77/50 - 127/60)  BP(mean): 66 (19 May 2020 07:00) (55 - 90)  ABP: --  ABP(mean): --  RR: 12 (19 May 2020 07:00) (12 - 30)  SpO2: 100% (19 May 2020 07:00) (93% - 100%)      CONSTITUTIONAL:  Well nourished.  NAD    ENT:   Airway patent,   Mouth with normal mucosa.   No thrush    EYES:   Pupils equal,   Round and reactive to light.    CARDIAC:   Normal rate,   Regular rhythm.    No edema      Vascular:  Normal systolic impulse  No Carotid bruits    RESPIRATORY:   No wheezing  Bilateral BS  Normal chest expansion  Not tachypneic,  No use of accessory muscles    GASTROINTESTINAL:  Abdomen soft,   Non-tender,   No guarding,   + BS    GENITOURINARY  normal genitalia for sex  no edema    MUSCULOSKELETAL:   Range of motion is not limited,  No clubbing, cyanosis    NEUROLOGICAL:   Alert and oriented   No motor  deficits.  pertinent DTRs normal    SKIN:   Skin normal color for race,   Warm and dry   No evidence of rash.    PSYCHIATRIC:    No apparent risk to self or others.    HEMATOLOGICAL:  No cervical  lymphadenopathy.  no inguinal lymphadenopathy      05-18-20 @ 07:01  -  05-19-20 @ 07:00  --------------------------------------------------------  IN:    heparin Infusion: 155 mL    IV PiggyBack: 250 mL  Total IN: 405 mL    OUT:    Indwelling Catheter - Urethral: 55 mL  Total OUT: 55 mL    Total NET: 350 mL          LABS:                            9.5    4.54  )-----------( 95       ( 19 May 2020 04:50 )             29.0                                               05-19    139  |  104  |  72<HH>  ----------------------------<  105<H>  4.0   |  19  |  5.8<HH>    19 May 2020 04:50    139    |  104    |  72<HH>  ----------------------------<  105<H>  4.0     |  19     |  5.8<HH>  18 May 2020 05:15    136    |  104    |  68<HH>  ----------------------------<  60<L>  4.3     |  17     |  5.3<HH>    Ca    8.4<L>      19 May 2020 04:50  Ca    8.0<L>      18 May 2020 05:15    TPro  5.0<L>  /  Alb  3.1<L>  /  TBili  0.2    /  DBili  x      /  AST  8      /  ALT  5      /  AlkPhos  47     19 May 2020 04:50  TPro  5.1<L>  /  Alb  3.1<L>  /  TBili  0.4    /  DBili  x      /  AST  8      /  ALT  5      /  AlkPhos  47     18 May 2020 05:15      Ca    8.4<L>      19 May 2020 04:50    TPro  5.0<L>  /  Alb  3.1<L>  /  TBili  0.2  /  DBili  x   /  AST  8   /  ALT  5   /  AlkPhos  47  05-19      PT/INR - ( 19 May 2020 04:50 )   PT: 13.30 sec;   INR: 1.16 ratio         PTT - ( 19 May 2020 04:50 )  PTT:59.3 sec                                                                                     LIVER FUNCTIONS - ( 19 May 2020 04:50 )  Alb: 3.1 g/dL / Pro: 5.0 g/dL / ALK PHOS: 47 U/L / ALT: 5 U/L / AST: 8 U/L / GGT: x                                                  GI PCR Panel, Stool (collected 17 May 2020 12:38)  Source: .Stool Feces  Final Report (18 May 2020 02:38):    GI PCR Results: NOT detected    *******Please Note:*******    GI panel PCR evaluates for:    Campylobacter, Plesiomonas shigelloides, Salmonella,    Vibrio, Yersinia enterocolitica, Enteroaggregative    Escherichia coli (EAEC), Enteropathogenic E.coli (EPEC),    Enterotoxigenic E. coli (ETEC) lt/st, Shiga-like    toxin-producing E. coli (STEC) stx1/stx2,    Shigella/ Enteroinvasive E. coli (EIEC), Cryptosporidium,    Cyclospora cayetanensis, Entamoeba histolytica,    Giardia lamblia, Adenovirus F 40/41, Astrovirus,    Norovirus GI/GII, Rotavirus A, Sapovirus                                                                                           MEDICATIONS  (STANDING):  aMIOdarone    Tablet 400 milliGRAM(s) Oral every 12 hours  aMIOdarone Infusion 1 mG/Min (33.3 mL/Hr) IV Continuous <Continuous>  ascorbic acid 500 milliGRAM(s) Oral daily  aspirin  chewable 81 milliGRAM(s) Oral daily  atorvastatin 40 milliGRAM(s) Oral at bedtime  chlorhexidine 4% Liquid 1 Application(s) Topical <User Schedule>  dextrose 5%. 1000 milliLiter(s) (50 mL/Hr) IV Continuous <Continuous>  dextrose 50% Injectable 12.5 Gram(s) IV Push once  dextrose 50% Injectable 25 Gram(s) IV Push once  dextrose 50% Injectable 25 Gram(s) IV Push once  heparin  Infusion 800 Unit(s)/Hr (6 mL/Hr) IV Continuous <Continuous>  insulin glargine Injectable (LANTUS) 15 Unit(s) SubCutaneous every morning  insulin lispro Injectable (HumaLOG) 5 Unit(s) SubCutaneous three times a day before meals  lactobacillus acidophilus 1 Tablet(s) Oral three times a day  metoprolol tartrate 25 milliGRAM(s) Oral every 12 hours  pantoprazole    Tablet 40 milliGRAM(s) Oral before breakfast  sertraline 25 milliGRAM(s) Oral daily  tiotropium 18 MICROgram(s) Capsule 1 Capsule(s) Inhalation daily    MEDICATIONS  (PRN):  acetaminophen   Tablet .. 650 milliGRAM(s) Oral every 6 hours PRN Moderate Pain (4 - 6)  aluminum hydroxide/magnesium hydroxide/simethicone Suspension 15 milliLiter(s) Oral once PRN Dyspepsia  dextrose 40% Gel 15 Gram(s) Oral once PRN Blood Glucose LESS THAN 70 milliGRAM(s)/deciliter  glucagon  Injectable 1 milliGRAM(s) IntraMuscular once PRN Glucose LESS THAN 70 milligrams/deciliter  guaifenesin/dextromethorphan  Syrup 10 milliLiter(s) Oral every 6 hours PRN Cough  loperamide 2 milliGRAM(s) Oral every 4 hours PRN Diarrhea      New X-rays reviewed:                                                                                  ECHO    CXR interpreted by me:  Small left effusion

## 2022-09-11 NOTE — PROGRESS NOTE ADULT - NS ED BHA TELEPSYCH PATIENT LOCATION
[facility] Alert and oriented to person, place and time/Symptoms improved

## 2022-10-03 NOTE — ASU PREOP CHECKLIST - NS PREOP CHK HIBICLENS NA
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 He is admitted with cough, congestion and fevers starting overnight on 9/27, found to have RSV bronchiolitis and acute hypoxic respiratory failure requiring HFNC, originally at 14 L on 30% O2. Decadron x 2 days started on 10/2/22 due to concern for bronchospasm. Jama is currently on day 6 of illness breathing 23% O2 at 14 liters.     #Acute Hypoxic Respiratory Failure 2/2 RSV Bronchiolitis  #Concern for Aspiration Pneumonia  -RSV positive on 9/27  -On HFNC, wean as tolerated  -Albuterol q4h while awake due to significant family history of asthma and concern for bronchospasm  -Dexamethasone 0.6 mg/kg today  -Continuous pulse ox  -Tylenol PRN  -Hx of aspiration, CXR on 9/29 without signs of a focal pneumonia, continue cefdinir for now (started 9/30), can discuss discontinuing it before end of 5 day course if low suspicion for aspiration  - NPO    #FEN/GI  -Nutrition consulted for G-tube feeding recommendation, appreciate recs  -Restarted home bolus feeds via GT, 180cc q4h  -Can likely start PO feeds when at < 1L/kg HFNC  -PTA pantoprazole, Zofran for nausea       Diet: Pediatric Formula Bolus Feeding: Daily [employer] Pediatric Standard 1.2; Other - Specify; + 125 mL Free water per carton making it 24 Kcal; Gastrostomy/PEG tube; 180; mL(s); Feedings per day; 5; 9:00 AM; 12:00 PM; 3:00 PM; 6:00 PM; 9:00 PM; 9pm f...    DVT Prophylaxis: Low Risk/Ambulatory with no VTE prophylaxis indicated  Otto Catheter: Not present  Central Lines: None  Cardiac Monitoring: None  Code Status: Full Code      Disposition Plan   Expected discharge:   Expected Discharge Date: 10/04/2022        Discharge Comments: needs to  get off HF, eat/drink   recommended to home once on RA.     The patient's care was discussed with Dr. Parrish.    Ned Bruce  Pediatric Service   [facility]  Securely message with the Vocera Web Console (learn more here)  Text page via [facility] Paging/Directory   Please see signed in provider for up to date coverage information      Clinically Significant Risk Factors Present on Admission                          Resident/Fellow Attestation   I, Quintin Meeks MD, was present with the medical/KASEY student who participated in the service and in the documentation of the note.  I have verified the history and personally performed the physical exam and medical decision making.  I agree with the assessment and plan of care as documented in the note.      Key Findings: On HFNC 14L, 30% this AM, good urine output. Got another dose of decadron today. Last dose of cefdinir tomorrow. Albuterol q4h. Continue to wean O2 as tolerated, on day 6 of illness.     Patient and plan discussed with Dr. Parrish.    Quintin Meeks MD  PGY1  Date of Service (when I saw the patient): 10/03/22    ______________________________________________________________________    Interval History    -No acute events overnight.   - Lungs sounded improved compared to yesterday with good air movement.  - Episode of emesis this morning  - Was coughing this morning, parents say it sounds like a productive cough  - Generally tolerating GT feeds  - Has been alert and playful per parents    Data reviewed today: I reviewed all medications, new labs and imaging results over the last 24 hours.     Physical Exam   Vital Signs: Temp: 98.7  F (37.1  C) Temp src: Axillary BP: 101/66 Pulse: 118   Resp: (!) 32 SpO2: 95 % O2 Device: High Flow Nasal Cannula (HFNC) Oxygen Delivery: 13 LPM  Weight: 20 lbs 13.34 oz  GENERAL: Active, alert, in no acute distress.  SKIN: Clear. No significant rash, abnormal pigmentation or  lesions  HEAD: Normocephalic. Atraumatic.  EYES: Normal conjunctivae.  NOSE: Clear rhinorrhea. HFNC in place.  MOUTH/THROAT: Clear. No oral lesions. Teeth without obvious abnormalities.  NECK: Supple, no masses.   LUNGS: Coarse lung sounds throughout with prolonged expiratory phase and scattered wheezes, improved compared to yesterday. Mildly increased work of breathing.  HEART: Regular rhythm. Normal S1/S2. No murmurs. Normal pulses.  ABDOMEN: Soft, non-tender, not distended, no masses or hepatosplenomegaly. Bowel sounds normal.   EXTREMITIES: Full range of motion, no deformities  NEUROLOGIC: No focal findings other than known delays. Moving all extremities      Data   No lab results found in last 7 days.     #1:

## 2022-12-19 NOTE — ED PROVIDER NOTE - NEUROLOGICAL, MLM
Detail Level: Detailed Depth Of Biopsy: dermis Was A Bandage Applied: Yes Size Of Lesion In Cm: 0 Biopsy Type: H and E Biopsy Method: Dermablade Anesthesia Type: 1% lidocaine with epinephrine Anesthesia Volume In Cc (Will Not Render If 0): 0.5 Hemostasis: Drysol Wound Care: Petrolatum Dressing: bandage Destruction After The Procedure: No Type Of Destruction Used: Curettage Curettage Text: The wound bed was treated with curettage after the biopsy was performed. Cryotherapy Text: The wound bed was treated with cryotherapy after the biopsy was performed. Electrodesiccation Text: The wound bed was treated with electrodesiccation after the biopsy was performed. Electrodesiccation And Curettage Text: The wound bed was treated with electrodesiccation and curettage after the biopsy was performed. Silver Nitrate Text: The wound bed was treated with silver nitrate after the biopsy was performed. Lab: 6 Lab Facility: 3 Consent: Written consent was obtained and risks were reviewed including but not limited to scarring, infection, bleeding, scabbing, incomplete removal, nerve damage and allergy to anesthesia. Post-Care Instructions: I reviewed with the patient in detail post-care instructions. Patient is to keep the biopsy site dry overnight, and then apply bacitracin twice daily until healed. Patient may apply hydrogen peroxide soaks to remove any crusting. Alert and oriented, no focal deficits, no motor or sensory deficits. Notification Instructions: Patient will be notified of biopsy results. However, patient instructed to call the office if not contacted within 2 weeks. Billing Type: Third-Party Bill Information: Selecting Yes will display possible errors in your note based on the variables you have selected. This validation is only offered as a suggestion for you. PLEASE NOTE THAT THE VALIDATION TEXT WILL BE REMOVED WHEN YOU FINALIZE YOUR NOTE. IF YOU WANT TO FAX A PRELIMINARY NOTE YOU WILL NEED TO TOGGLE THIS TO 'NO' IF YOU DO NOT WANT IT IN YOUR FAXED NOTE.

## 2023-01-06 NOTE — PATIENT PROFILE ADULT - NSPRESCRALCFREQ_GEN_A_NUR
Problem: Palliative Care  Goal: Enhanced Quality of Life  Outcome: Ongoing, Progressing     Problem: Adult Inpatient Plan of Care  Goal: Plan of Care Review  Outcome: Ongoing, Progressing  Goal: Absence of Hospital-Acquired Illness or Injury  Outcome: Ongoing, Progressing  Goal: Optimal Comfort and Wellbeing  Outcome: Ongoing, Progressing  Goal: Readiness for Transition of Care  Outcome: Ongoing, Progressing      Never

## 2023-02-07 NOTE — PROCEDURE NOTE - NSPICCPOWRINJECT_VASC_A_CORE
NOTIFICATION OF ADMISSION DISCHARGE   This is a Notification of Discharge from [address]  Please be advised that this patient has been discharge from our facility  Below you will find the admission and discharge date and time including the patient’s disposition  UTILIZATION REVIEW CONTACT:  Daniel Torres MA  Utilization   Network Utilization Review Department  Phone: [phone] x carefully listen to the prompts  All voicemails are confidential   Email: [email or web address]     ADMISSION INFORMATION  PRESENTATION DATE: 2/4/2023  6:03 PM  OBERVATION ADMISSION DATE:   INPATIENT ADMISSION DATE: 2/4/23  7:56 PM   DISCHARGE DATE: 2/6/2023  2:36 PM   DISPOSITION:Home with Home Health Care    IMPORTANT INFORMATION:  Send all requests for admission clinical reviews, approved or denied determinations and any other requests to dedicated fax number below belonging to the campus where the patient is receiving treatment   List of dedicated fax numbers:  [address] DENIALS (Administrative/Medical Necessity) [phone]   [address] (Maternity/NICU/Pediatrics) [phone]   [facility] [phone]   [facility] [phone]   [address] [phone]   [address] [facility] [phone] 90 [facility] [phone]   [address] [place] [address] [address] [phone]   [facility]  [phone]   [address] [facility] [phone]   [address] [facility] [address] [facility] [phone] Yes

## 2023-02-24 NOTE — PROGRESS NOTE ADULT - ASSESSMENT
Brief chart review. Looks like prescription was already sent this AM by PCP. Will close encounter.    Thang Ghotra MD  [facility]  2/24/2023     IMPRESSION:  - Cardiac arrest in the setting of arrythmia? 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  Vent management per pulm/crit    CARDIOVASCULAR:  - s/p cath: patent grafts - no significant aortic valve gradient  - TTE noted: EF 38% - severe AS based on echo (but no gradient in cath)  - pAF on amiodarone - continue amiodarone; s/p digoxin dose yesterday  - Heparin drip for AF  - Will need betablockers and ACE/ARB once off pressors  - s/p AICD for secondary prevention 4/12/21  - F/U EP    GI:   - GI prophylaxis.  Feeding     RENAL:    - Follow up lytes.  Correct as needed  - HD as per renal    INFECTIOUS DISEASE:   - Follow up cultures  - ABx per critical care team    HEMATOLOGICAL:    - DVT prophylaxis - on heparin drip    ENDOCRINE:    - Follow up FS.  Insulin protocol if needed.    MUSCULOSKELETAL:  - plan for hip surgery    DISPO: ICU

## 2023-04-06 NOTE — PROCEDURE NOTE - NSASSISTBY_GEN_A_CORE
Myself
Myself
Topical Ketoconazole Counseling: Patient counseled that this medication may cause skin irritation or allergic reactions.  In the event of skin irritation, the patient was advised to reduce the amount of the drug applied or use it less frequently.   The patient verbalized understanding of the proper use and possible adverse effects of ketoconazole.  All of the patient's questions and concerns were addressed.
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
## 2023-05-04 NOTE — ED CDU PROVIDER INITIAL DAY NOTE - CHIEF COMPLAINT
The patient is a 68y Female complaining of Opioid Pregnancy And Lactation Text: These medications can lead to premature delivery and should be avoided during pregnancy. These medications are also present in breast milk in small amounts.

## 2023-07-13 NOTE — ED ADULT NURSE NOTE - NSFALLRSKOUTCOME_ED_ALL_ED
Is the patient currently in the state of [place]? YES    Visit mode:TELEPHONE    If the visit is dropped, the patient can be reconnected by: TELEPHONE VISIT: Phone number: [phone]    Will anyone else be joining the visit? NO      How would you like to obtain your AVS? MyChart    Are changes needed to the allergy or medication list? NO    Reason for visit: RECHECK    Cyndi Jalloh       Universal Safety Interventions

## 2023-07-17 NOTE — ED PROCEDURE NOTE - EBL
pt presents to ED with c/o of syncopal episode, pt reports "I was walking in the park, I started to feel anxious and than I passed out for like 5 seconds" pt reports hitting head on grass. no bruising or swelling noted to face, denies chest pain and SOB at this time, denies N/v pt on baby aspirin. EKG in progress
minimal
minimal

## 2023-08-12 NOTE — PROGRESS NOTE ADULT - SUBJECTIVE AND OBJECTIVE BOX
[facility] FOLLOW UP NOTE  --------------------------------------------------------------------------------  pt complaining of discomfort at surgical site  otherwise she is in good spirits  No SOB  tolerated HD on Saturday without difficulty  montejo in place draining well  pt requesting [facility] for home care  usual dialysis days are M/W/F        PAST HISTORY  --------------------------------------------------------------------------------  No significant changes to PMH, PSH, FHx, SHx, unless otherwise noted    ALLERGIES & MEDICATIONS  --------------------------------------------------------------------------------  Allergies    No Known Allergies    Intolerances      Standing Inpatient Medications  aMIOdarone    Tablet 200 milliGRAM(s) Oral daily  aspirin  chewable 81 milliGRAM(s) Oral daily  atorvastatin 20 milliGRAM(s) Oral at bedtime  chlorhexidine 4% Liquid 1 Application(s) Topical <User Schedule>  dextrose 40% Gel 15 Gram(s) Oral once  dextrose 50% Injectable 25 Gram(s) IV Push once  dextrose 50% Injectable 12.5 Gram(s) IV Push once  dextrose 50% Injectable 25 Gram(s) IV Push once  doxercalciferol Injectable 2 MICROGram(s) IV Push <User Schedule>  epoetin mana-epbx (RETACRIT) Injectable 50747 Unit(s) IV Push <User Schedule>  ergocalciferol 97737 Unit(s) Oral <User Schedule>  heparin   Injectable 5000 Unit(s) SubCutaneous every 8 hours  insulin glargine SubCutaneous Injection (LANTUS) - Peds 5 Unit(s) SubCutaneous at bedtime  insulin lispro (ADMELOG) corrective regimen sliding scale   SubCutaneous three times a day before meals  melatonin 5 milliGRAM(s) Oral at bedtime  metoprolol succinate ER 25 milliGRAM(s) Oral two times a day  piperacillin/tazobactam IVPB.. 2.25 Gram(s) IV Intermittent every 8 hours  sertraline 50 milliGRAM(s) Oral daily  sevelamer carbonate 1600 milliGRAM(s) Oral three times a day    PRN Inpatient Medications  acetaminophen   Tablet .. 650 milliGRAM(s) Oral every 6 hours PRN  aluminum hydroxide/magnesium hydroxide/simethicone Suspension 30 milliLiter(s) Oral every 4 hours PRN  oxyCODONE    IR 5 milliGRAM(s) Oral every 6 hours PRN      REVIEW OF SYSTEMS  --------------------------------------------------------------------------------  no new complaints    VITALS/PHYSICAL EXAM  --------------------------------------------------------------------------------  T(C): 36.7 (03-07-21 @ 14:10), Max: 37.6 (03-06-21 @ 21:00)  HR: 62 (03-07-21 @ 14:10) (62 - 73)  BP: 133/67 (03-07-21 @ 14:10) (103/55 - 135/65)  RR: 18 (03-07-21 @ 14:10) (18 - 18)  SpO2: --  Wt(kg): --        03-06-21 @ 07:01  -  03-07-21 @ 07:00  --------------------------------------------------------  IN: 0 mL / OUT: 1600 mL / NET: -1600 mL    03-07-21 @ 07:01  -  03-07-21 @ 19:54  --------------------------------------------------------  IN: 340 mL / OUT: 150 mL / NET: 190 mL      Physical Exam:  Chest: lungs clear, HR reg  Abd: soft, NT  no edema    Vascular access: Right CVC chest wall, maturing AVF left arm    LABS/STUDIES  --------------------------------------------------------------------------------              9.0    5.78  >-----------<  179      [03-07-21 @ 11:03]              28.7     138  |  97  |  40  ----------------------------<  64      [03-07-21 @ 11:03]  4.2   |  29  |  4.1        Ca     8.9     [03-07-21 @ 11:03]      Mg     2.5     [03-07-21 @ 11:03]      Phos  4.0     [03-07-21 @ 11:03]      Impression:  ESRD on HD    Plan:  next HD Monday  ID f/u for outpt antibiotics  surgery f/u for outpt wound care  
 Patient is a 70y old  Female who presents with a chief complaint of L perirectal abscess (03 Mar 2021 08:40)      T(F): 95.5 (03-04-21 @ 07:01), Max: 98.5 (03-03-21 @ 15:34)  HR: 62 (03-04-21 @ 05:00)  BP: 105/53 (03-04-21 @ 05:00)  RR: 16 (03-04-21 @ 07:01)  SpO2: 99% (03-04-21 @ 05:00) (97% - 100%)    PHYSICAL EXAM:  GENERAL: NAD, well-groomed, well-developed  HEAD:  Atraumatic, Normocephalic  EYES: EOMI, PERRLA, conjunctiva and sclera clear  ENMT: No tonsillar erythema, exudates, or enlargement; Moist mucous membranes, Good dentition, No lesions  NECK: Supple, No JVD, Normal thyroid  NERVOUS SYSTEM:  Alert & Oriented X3,  Motor Strength 5/5 B/L upper and lower extremities  CHEST/LUNG: Clear to percussion bilaterally; No rales, rhonchi, wheezing, or rubs  HEART: Regular rate and rhythm; No murmurs, rubs, or gallops  ABDOMEN: Soft, Nontender, Nondistended; Bowel sounds present  EXTREMITIES:   No clubbing, cyanosis, or edema  LYMPH: No lymphadenopathy noted  SKIN: No rashes or lesions    labs  03-03    139  |  98  |  51<H>  ----------------------------<  92  4.1   |  24  |  5.4<HH>    Ca    9.0      03 Mar 2021 06:45  Mg     2.4     03-02    TPro  6.5  /  Alb  3.5  /  TBili  0.2  /  DBili  x   /  AST  24  /  ALT  10  /  AlkPhos  112  03-02                          7.3    7.82  )-----------( 188      ( 04 Mar 2021 05:17 )             24.5       PT/INR - ( 02 Mar 2021 15:57 )   PT: 13.80 sec;   INR: 1.20 ratio         PTT - ( 02 Mar 2021 15:57 )  PTT:27.1 sec        acetaminophen   Tablet .. 650 milliGRAM(s) Oral every 6 hours PRN  aMIOdarone    Tablet 200 milliGRAM(s) Oral daily  aspirin  chewable 81 milliGRAM(s) Oral daily  atorvastatin 40 milliGRAM(s) Oral at bedtime  chlorhexidine 4% Liquid 1 Application(s) Topical <User Schedule>  dextrose 40% Gel 15 Gram(s) Oral once  dextrose 50% Injectable 25 Gram(s) IV Push once  dextrose 50% Injectable 12.5 Gram(s) IV Push once  dextrose 50% Injectable 25 Gram(s) IV Push once  ergocalciferol 37703 Unit(s) Oral <User Schedule>  hydrALAZINE 25 milliGRAM(s) Oral three times a day  insulin glargine SubCutaneous Injection (LANTUS) - Peds 5 Unit(s) SubCutaneous at bedtime  insulin lispro (ADMELOG) corrective regimen sliding scale   SubCutaneous three times a day before meals  melatonin 5 milliGRAM(s) Oral at bedtime  metolazone 5 milliGRAM(s) Oral daily  metoprolol succinate ER 25 milliGRAM(s) Oral two times a day  oxyCODONE    IR 5 milliGRAM(s) Oral every 6 hours PRN  pantoprazole  Injectable 40 milliGRAM(s) IV Push daily  piperacillin/tazobactam IVPB.. 2.25 Gram(s) IV Intermittent every 12 hours  sertraline 50 milliGRAM(s) Oral daily  sevelamer carbonate 1600 milliGRAM(s) Oral three times a day  
HPI:  Pt feels much better today, no buttock pain presently.    PAST MEDICAL & SURGICAL HISTORY:  Chronic indwelling Montejo catheter    Uterine prolapse    End stage renal disease  on dialysis Mon, Wed, Fr    Pleural effusion due to congestive heart failure    Congestive heart failure (CHF)    Diabetes    Chronic indwelling Montejo catheter    History of thoracentesis    S/P CABG (coronary artery bypass graft)  11/2019    History of repair of hip fracture  2019        Allergies    No Known Allergies        MEDICATIONS  (STANDING):  aMIOdarone    Tablet 200 milliGRAM(s) Oral daily  aspirin  chewable 81 milliGRAM(s) Oral daily  atorvastatin 40 milliGRAM(s) Oral at bedtime  chlorhexidine 4% Liquid 1 Application(s) Topical <User Schedule>  dextrose 40% Gel 15 Gram(s) Oral once  dextrose 50% Injectable 25 Gram(s) IV Push once  dextrose 50% Injectable 12.5 Gram(s) IV Push once  dextrose 50% Injectable 25 Gram(s) IV Push once  ergocalciferol 67339 Unit(s) Oral <User Schedule>  insulin glargine SubCutaneous Injection (LANTUS) - Peds 5 Unit(s) SubCutaneous at bedtime  insulin lispro (ADMELOG) corrective regimen sliding scale   SubCutaneous three times a day before meals  melatonin 5 milliGRAM(s) Oral at bedtime  metolazone 5 milliGRAM(s) Oral daily  metoprolol succinate ER 25 milliGRAM(s) Oral two times a day  piperacillin/tazobactam IVPB.. 2.25 Gram(s) IV Intermittent every 12 hours  sertraline 50 milliGRAM(s) Oral daily  sevelamer carbonate 1600 milliGRAM(s) Oral three times a day    MEDICATIONS  (PRN):  acetaminophen   Tablet .. 650 milliGRAM(s) Oral every 6 hours PRN Temp greater or equal to 38C (100.4F), Mild Pain (1 - 3)  oxyCODONE    IR 5 milliGRAM(s) Oral every 6 hours PRN Moderate Pain (4 - 6)      ROS:  General:	no fever, weight loss,  chills  Respiratory and Thorax: no cough, wheeze,  sob  Cardiovascular:	no chest pain, palpitations, dizziness  Gastrointestinal:	no nausea, vomiting, diarrhea, abd pain        Vital Signs Last 24 Hrs  T(F): 95.5 (04 Mar 2021 07:01), Max: 98.5 (03 Mar 2021 15:34)  HR: 62 (04 Mar 2021 05:00) (59 - 72)  BP: 105/53 (04 Mar 2021 05:00) (87/50 - 126/58)  BP(mean): 75 (04 Mar 2021 05:00) (64 - 75)  RR: 16 (04 Mar 2021 07:01) (16 - 30)  SpO2: 99% (04 Mar 2021 05:00) (97% - 100%)    PHYSICAL EXAM:      Constitutional: A&Ox4  Gastrointestinal: soft nt  nd + bs no rebound or guarding  Genitourinary: no cva tenderness, +montejo in pace  Extremities: normal rom, no edema, calf tenderness  Skin: dressing to left buttock is c/d/i                            7.3    7.82  )-----------( 188      ( 04 Mar 2021 05:17 )             24.5       03-04    137  |  99  |  62<HH>  ----------------------------<  112<H>  4.6   |  24  |  6.3<HH>    Ca    8.6      04 Mar 2021 05:17  Mg     2.5     03-04    TPro  5.4<L>  /  Alb  3.3<L>  /  TBili  0.2  /  DBili  x   /  AST  10  /  ALT  7   /  AlkPhos  92  03-04      PT/INR - ( 02 Mar 2021 15:57 )   PT: 13.80 sec;   INR: 1.20 ratio         PTT - ( 02 Mar 2021 15:57 )  PTT:27.1 sec        
[facility] FOLLOW UP NOTE  --------------------------------------------------------------------------------  24 hour events/subjective: Patient examined. Appears comfortable.    PAST HISTORY  --------------------------------------------------------------------------------  No significant changes to PMH, PSH, FHx, SHx, unless otherwise noted    ALLERGIES & MEDICATIONS  --------------------------------------------------------------------------------  Allergies    No Known Allergies    Intolerances      Standing Inpatient Medications  aMIOdarone    Tablet 200 milliGRAM(s) Oral daily  aspirin  chewable 81 milliGRAM(s) Oral daily  atorvastatin 20 milliGRAM(s) Oral at bedtime  chlorhexidine 4% Liquid 1 Application(s) Topical <User Schedule>  dextrose 40% Gel 15 Gram(s) Oral once  dextrose 50% Injectable 25 Gram(s) IV Push once  dextrose 50% Injectable 12.5 Gram(s) IV Push once  dextrose 50% Injectable 25 Gram(s) IV Push once  doxercalciferol Injectable 2 MICROGram(s) IV Push <User Schedule>  epoetin mana-epbx (RETACRIT) Injectable 34181 Unit(s) IV Push <User Schedule>  ergocalciferol 18507 Unit(s) Oral <User Schedule>  heparin   Injectable 5000 Unit(s) SubCutaneous every 8 hours  insulin glargine SubCutaneous Injection (LANTUS) - Peds 5 Unit(s) SubCutaneous at bedtime  insulin lispro (ADMELOG) corrective regimen sliding scale   SubCutaneous three times a day before meals  melatonin 5 milliGRAM(s) Oral at bedtime  metoprolol succinate ER 25 milliGRAM(s) Oral two times a day  piperacillin/tazobactam IVPB 2.25 Gram(s) IV Intermittent every 8 hours  sertraline 50 milliGRAM(s) Oral daily  sevelamer carbonate 1600 milliGRAM(s) Oral three times a day    PRN Inpatient Medications  acetaminophen   Tablet .. 650 milliGRAM(s) Oral every 6 hours PRN  aluminum hydroxide/magnesium hydroxide/simethicone Suspension 30 milliLiter(s) Oral every 4 hours PRN  oxyCODONE    IR 5 milliGRAM(s) Oral every 6 hours PRN    VITALS/PHYSICAL EXAM  --------------------------------------------------------------------------------  T(C): 36.7 (03-08-21 @ 04:53), Max: 36.7 (03-07-21 @ 14:10)  HR: 57 (03-08-21 @ 04:53) (57 - 62)  BP: 125/60 (03-08-21 @ 04:53) (125/60 - 133/67)  RR: 16 (03-08-21 @ 04:53) (16 - 18)    03-07-21 @ 07:01  -  03-08-21 @ 07:00  --------------------------------------------------------  IN: 340 mL / OUT: 250 mL / NET: 90 mL    Physical Exam:  	Gen: NAD  	Pulm: CTA B/L  	CV: RRR, S1S2  	Abd: +BS, soft, nontender/nondistended  	: No suprapubic tenderness  	LE: Warm, no edema  	Vascular access: TDC, AVF    LABS/STUDIES  --------------------------------------------------------------------------------              9.0    5.78  >-----------<  179      [03-07-21 @ 11:03]              28.7     138  |  97  |  40  ----------------------------<  64      [03-07-21 @ 11:03]  4.2   |  29  |  4.1        Ca     8.9     [03-07-21 @ 11:03]      Mg     2.5     [03-07-21 @ 11:03]      Phos  4.0     [03-07-21 @ 11:03]    Creatinine Trend:  SCr 4.1 [03-07 @ 11:03]  SCr 4.6 [03-05 @ 07:06]  SCr 6.3 [03-04 @ 05:17]  SCr 5.4 [03-03 @ 06:45]  SCr 4.6 [03-02 @ 15:57]    Iron 55, TIBC 167, %sat 33      [05-27-20 @ 13:22]  Ferritin 636      [05-27-20 @ 13:22]  HbA1c 5.7      [11-09-19 @ 01:20]
[facility] FOLLOW UP NOTE  --------------------------------------------------------------------------------  24 hour events/subjective: Patient examined during HD. Appears comfortable.    PAST HISTORY  --------------------------------------------------------------------------------  No significant changes to PMH, PSH, FHx, SHx, unless otherwise noted    ALLERGIES & MEDICATIONS  --------------------------------------------------------------------------------  Allergies    No Known Allergies    Standing Inpatient Medications  aMIOdarone    Tablet 200 milliGRAM(s) Oral daily  aspirin  chewable 81 milliGRAM(s) Oral daily  atorvastatin 20 milliGRAM(s) Oral at bedtime  chlorhexidine 4% Liquid 1 Application(s) Topical <User Schedule>  dextrose 40% Gel 15 Gram(s) Oral once  dextrose 50% Injectable 25 Gram(s) IV Push once  dextrose 50% Injectable 12.5 Gram(s) IV Push once  dextrose 50% Injectable 25 Gram(s) IV Push once  ergocalciferol 82329 Unit(s) Oral <User Schedule>  heparin   Injectable 5000 Unit(s) SubCutaneous every 8 hours  insulin glargine SubCutaneous Injection (LANTUS) - Peds 5 Unit(s) SubCutaneous at bedtime  insulin lispro (ADMELOG) corrective regimen sliding scale   SubCutaneous three times a day before meals  melatonin 5 milliGRAM(s) Oral at bedtime  metolazone 5 milliGRAM(s) Oral daily  metoprolol succinate ER 25 milliGRAM(s) Oral two times a day  piperacillin/tazobactam IVPB 2.25 Gram(s) IV Intermittent every 8 hours  sertraline 50 milliGRAM(s) Oral daily  sevelamer carbonate 1600 milliGRAM(s) Oral three times a day    PRN Inpatient Medications  acetaminophen   Tablet  650 milliGRAM(s) Oral every 6 hours PRN  oxyCODONE    IR 5 milliGRAM(s) Oral every 6 hours PRN    VITALS/PHYSICAL EXAM  --------------------------------------------------------------------------------  T(C): 35.9 (03-04-21 @ 12:00), Max: 36.9 (03-03-21 @ 15:34)  HR: 77 (03-04-21 @ 14:04) (59 - 77)  BP: 113/59 (03-04-21 @ 13:04) (87/50 - 139/70)  RR: 39 (03-04-21 @ 14:04) (16 - 39)  SpO2: 89% (03-04-21 @ 14:04) (89% - 100%)  Height (cm): 160 (03-03-21 @ 13:42)  Weight (kg): 58.3 (03-03-21 @ 13:42)  BMI (kg/m2): 22.8 (03-03-21 @ 13:42)  BSA (m2): 1.6 (03-03-21 @ 13:42)    03-03-21 @ 07:01  -  03-04-21 @ 07:00  --------------------------------------------------------  IN: 730 mL / OUT: 30 mL / NET: 700 mL    03-04-21 @ 07:01  -  03-04-21 @ 14:51  --------------------------------------------------------  IN: 546 mL / OUT: 2000 mL / NET: -1454 mL      Physical Exam:  	Gen: NAD  	Pulm: CTA B/L  	CV: RRR, S1S2  	Abd: +BS, soft, nontender/nondistended  	: No suprapubic tenderness  	LE: Warm, trace edema  	Vascular access: TDC    LABS/STUDIES  --------------------------------------------------------------------------------              7.3    7.82  >-----------<  188      [03-04-21 @ 05:17]              24.5     137  |  99  |  62  ----------------------------<  112      [03-04-21 @ 05:17]  4.6   |  24  |  6.3        Ca     8.6     [03-04-21 @ 05:17]      Mg     2.5     [03-04-21 @ 05:17]    TPro  5.4  /  Alb  3.3  /  TBili  0.2  /  DBili  x   /  AST  10  /  ALT  7   /  AlkPhos  92  [03-04-21 @ 05:17]    PT/INR: PT 13.80, INR 1.20       [03-02-21 @ 15:57]  PTT: 27.1       [03-02-21 @ 15:57]    Troponin 0.27      [03-02-21 @ 15:57]    Creatinine Trend:  SCr 6.3 [03-04 @ 05:17]  SCr 5.4 [03-03 @ 06:45]  SCr 4.6 [03-02 @ 15:57]    Iron 55, TIBC 167, %sat 33      [05-27-20 @ 13:22]  Ferritin 636      [05-27-20 @ 13:22]  HbA1c 5.7      [11-09-19 @ 01:20]
[facility] FOLLOW UP NOTE  --------------------------------------------------------------------------------  24 hour events/subjective: Patient examined. Appears comfortable.    PAST HISTORY  --------------------------------------------------------------------------------  No significant changes to PMH, PSH, FHx, SHx, unless otherwise noted    ALLERGIES & MEDICATIONS  --------------------------------------------------------------------------------  Allergies    No Known Allergies    Standing Inpatient Medications  aMIOdarone    Tablet 200 milliGRAM(s) Oral daily  aspirin  chewable 81 milliGRAM(s) Oral daily  atorvastatin 20 milliGRAM(s) Oral at bedtime  chlorhexidine 4% Liquid 1 Application(s) Topical <User Schedule>  dextrose 40% Gel 15 Gram(s) Oral once  dextrose 50% Injectable 25 Gram(s) IV Push once  dextrose 50% Injectable 12.5 Gram(s) IV Push once  dextrose 50% Injectable 25 Gram(s) IV Push once  ergocalciferol 18808 Unit(s) Oral <User Schedule>  heparin   Injectable 5000 Unit(s) SubCutaneous every 8 hours  insulin glargine SubCutaneous Injection (LANTUS) - Peds 5 Unit(s) SubCutaneous at bedtime  insulin lispro (ADMELOG) corrective regimen sliding scale   SubCutaneous three times a day before meals  melatonin 5 milliGRAM(s) Oral at bedtime  metolazone 5 milliGRAM(s) Oral daily  metoprolol succinate ER 25 milliGRAM(s) Oral two times a day  piperacillin/tazobactam IVPB 2.25 Gram(s) IV Intermittent every 8 hours  sertraline 50 milliGRAM(s) Oral daily  sevelamer carbonate 1600 milliGRAM(s) Oral three times a day    PRN Inpatient Medications  acetaminophen   Tablet .. 650 milliGRAM(s) Oral every 6 hours PRN  aluminum hydroxide/magnesium hydroxide/simethicone Suspension 30 milliLiter(s) Oral every 4 hours PRN  oxyCODONE    IR 5 milliGRAM(s) Oral every 6 hours PRN      VITALS/PHYSICAL EXAM  --------------------------------------------------------------------------------  T(C): 36.1 (03-05-21 @ 14:07), Max: 37 (03-04-21 @ 19:05)  HR: 77 (03-05-21 @ 14:07) (66 - 83)  BP: 131/63 (03-05-21 @ 14:07) (105/56 - 131/63)  RR: 21 (03-05-21 @ 05:00) (18 - 27)  SpO2: 100% (03-04-21 @ 20:31) (87% - 100%)    03-04-21 @ 07:01  -  03-05-21 @ 07:00  --------------------------------------------------------  IN: 546 mL / OUT: 2000 mL / NET: -1454 mL    Physical Exam:  	Gen: NAD  	Pulm: CTA B/L  	CV: RRR, S1S2  	Abd: +BS, soft, nontender/nondistended  	: No suprapubic tenderness  	LE: Warm,  no edema  	Vascular access: TDC    LABS/STUDIES  --------------------------------------------------------------------------------              8.9    7.04  >-----------<  187      [03-05-21 @ 07:06]              28.9     140  |  99  |  45  ----------------------------<  63      [03-05-21 @ 07:06]  4.3   |  28  |  4.6        Ca     8.7     [03-05-21 @ 07:06]      Mg     2.5     [03-04-21 @ 05:17]      Phos  5.4     [03-05-21 @ 07:06]    TPro  5.4  /  Alb  3.3  /  TBili  0.2  /  DBili  x   /  AST  10  /  ALT  7   /  AlkPhos  92  [03-04-21 @ 05:17]    Creatinine Trend:  SCr 4.6 [03-05 @ 07:06]  SCr 6.3 [03-04 @ 05:17]  SCr 5.4 [03-03 @ 06:45]  SCr 4.6 [03-02 @ 15:57]    Iron 55, TIBC 167, %sat 33      [05-27-20 @ 13:22]  Ferritin 636      [05-27-20 @ 13:22]  HbA1c 5.7      [11-09-19 @ 01:20]  
GENERAL SURGERY PROGRESS NOTE     GUS WILBURN  [address] [place] [address] day :5d  Procedure: Incision and drainage of wound of buttock    Surgical Attending: Aspen Mota  Overnight events: no acute events overnight. Afebrile 24 hours. Pain controlled. Tolerating regular diet without nausea or vomiting. +BM    T(F): 97.1 (03-07-21 @ 05:06), Max: 99.6 (03-06-21 @ 21:00)  HR: 68 (03-07-21 @ 05:06) (65 - 77)  BP: 135/65 (03-07-21 @ 05:06) (103/55 - 138/81)  ABP: --  ABP(mean): --  RR: 18 (03-07-21 @ 05:06) (17 - 18)  SpO2: --      03-06-21 @ 07:01  -  03-07-21 @ 07:00  --------------------------------------------------------  IN:  Total IN: 0 mL    OUT:    Indwelling Catheter - Urethral (mL): 100 mL    Other (mL): 1500 mL  Total OUT: 1600 mL    Total NET: -1600 mL    DIET/FLUIDS: doxercalciferol Injectable 2 MICROGram(s) IV Push <User Schedule>  ergocalciferol 23214 Unit(s) Oral <User Schedule>    URINE:   03-06-21 @ 07:01  -  03-07-21 @ 07:00  --------------------------------------------------------  OUT: 100 mL       GI proph:    AC/ proph: aspirin  chewable 81 milliGRAM(s) Oral daily  heparin   Injectable 5000 Unit(s) SubCutaneous every 8 hours    ABx: piperacillin/tazobactam IVPB.. 2.25 Gram(s) IV Intermittent every 8 hours      PHYSICAL EXAM:  GENERAL: NAD, well-appearing  CHEST/LUNG: Clear to auscultation bilaterally  HEART: Regular rate and rhythm  ABDOMEN: Soft, Nontender, Nondistended;   RECTAL: Perirectal abscess s/p drainage with plain packing. Wound clean and dry, no evidence of purulence. Minimal surrounding erythema improving.   EXTREMITIES:  No clubbing, cyanosis, or edema      LABS  Labs:  CAPILLARY BLOOD GLUCOSE      POCT Blood Glucose.: 80 mg/dL (07 Mar 2021 07:18)  POCT Blood Glucose.: 124 mg/dL (06 Mar 2021 20:46)  POCT Blood Glucose.: 113 mg/dL (06 Mar 2021 16:38)  POCT Blood Glucose.: 78 mg/dL (06 Mar 2021 11:34)      RADIOLOGY & ADDITIONAL TESTS:        
GENERAL SURGERY PROGRESS NOTE     GUS WILBURN  70y  Female  Hospital day :6d  POD:  Procedure: Incision and drainage of wound of buttock    OVERNIGHT EVENTS:  Reports packing fell out while ambulating. Otherwise pain controlled, tolerating diet.     T(F): 98.1 (03-08-21 @ 04:53), Max: 98.1 (03-07-21 @ 14:10)  HR: 57 (03-08-21 @ 04:53) (57 - 62)  BP: 125/60 (03-08-21 @ 04:53) (125/60 - 133/67)  RR: 16 (03-08-21 @ 04:53) (16 - 18)    DIET/FLUIDS: doxercalciferol Injectable 2 MICROGram(s) IV Push <User Schedule>  ergocalciferol 87105 Unit(s) Oral <User Schedule>    URINE:   03-07-21 @ 07:01  -  03-08-21 @ 07:00  --------------------------------------------------------  OUT: 250 mL    AC/ proph: aspirin  chewable 81 milliGRAM(s) Oral daily  heparin   Injectable 5000 Unit(s) SubCutaneous every 8 hours    ABx: piperacillin/tazobactam IVPB.. 2.25 Gram(s) IV Intermittent every 8 hours    PHYSICAL EXAM:  GENERAL: NAD, well-appearing  CHEST/LUNG: Clear to auscultation bilaterally  HEART: Regular rate and rhythm  ABDOMEN: Soft, Nontender, Nondistended; L perirectal incision w/o drainage or erythema, prolapsed uterus  EXTREMITIES:  No clubbing, cyanosis, or edema    LABS  POCT Blood Glucose.: 81 mg/dL (08 Mar 2021 09:12)  POCT Blood Glucose.: 69 mg/dL (08 Mar 2021 07:52)  POCT Blood Glucose.: 143 mg/dL (07 Mar 2021 20:57)  POCT Blood Glucose.: 93 mg/dL (07 Mar 2021 16:32)  POCT Blood Glucose.: 102 mg/dL (07 Mar 2021 12:26)  POCT Blood Glucose.: 71 mg/dL (07 Mar 2021 11:40)                    9.0    5.78  )-----------( 179      ( 07 Mar 2021 11:03 )             28.7       Auto Neutrophil %: 69.5 % (03-07-21 @ 11:03)  Auto Immature Granulocyte %: 1.2 % (03-07-21 @ 11:03)    03-07    138  |  97<L>  |  40<H>  ----------------------------<  64<L>  4.2   |  29  |  4.1<HH>    Calcium, Total Serum: 8.9 mg/dL (03-07-21 @ 11:03)
  Patient is seen and examined at the bed side, is afebrile.      REVIEW OF SYSTEMS:. All other review systems are negative        ALLERGIES: No Known Allergies      Vital Signs Last 24 Hrs  T(C): 36.7 (07 Mar 2021 14:10), Max: 37.6 (06 Mar 2021 21:00)  T(F): 98.1 (07 Mar 2021 14:10), Max: 99.6 (06 Mar 2021 21:00)  HR: 62 (07 Mar 2021 14:10) (62 - 73)  BP: 133/67 (07 Mar 2021 14:10) (103/55 - 135/65)  BP(mean): --  RR: 18 (07 Mar 2021 14:10) (18 - 18)  SpO2: --      PHYSICAL EXAM:  GENERAL: Not in distress   CHEST/LUNG: Not using accessory muscles  HEART: s1 and s2 present  ABDOMEN:  Nontender and  Nondistended  EXTREMITIES: No pedal  edema  CNS: Awake and Alert      LABS:                        9.0    5.78  )-----------( 179      ( 07 Mar 2021 11:03 )             28.7                           7.3    7.82  )-----------( 188      ( 04 Mar 2021 05:17 )             24.5       03-07    138  |  97<L>  |  40<H>  ----------------------------<  64<L>  4.2   |  29  |  4.1<HH>    Ca    8.9      07 Mar 2021 11:03  Phos  4.0     03-07  Mg     2.5     03-07      03-04    137  |  99  |  62<HH>  ----------------------------<  112<H>  4.6   |  24  |  6.3<HH>    Ca    8.6      04 Mar 2021 05:17  Mg     2.5     03-04    TPro  5.4<L>  /  Alb  3.3<L>  /  TBili  0.2  /  DBili  x   /  AST  10  /  ALT  7   /  AlkPhos  92  03-04    PT/INR - ( 02 Mar 2021 15:57 )   PT: 13.80 sec;   INR: 1.20 ratio         PTT - ( 02 Mar 2021 15:57 )  PTT:27.1 sec    CAPILLARY BLOOD GLUCOSE  POCT Blood Glucose.: 108 mg/dL (04 Mar 2021 12:43)  POCT Blood Glucose.: 122 mg/dL (04 Mar 2021 07:47)  POCT Blood Glucose.: 150 mg/dL (03 Mar 2021 22:00)  POCT Blood Glucose.: 99 mg/dL (03 Mar 2021 15:39)          MEDICATIONS  (STANDING):    aMIOdarone    Tablet 200 milliGRAM(s) Oral daily  aspirin  chewable 81 milliGRAM(s) Oral daily  atorvastatin 20 milliGRAM(s) Oral at bedtime  chlorhexidine 4% Liquid 1 Application(s) Topical <User Schedule>  dextrose 40% Gel 15 Gram(s) Oral once  dextrose 50% Injectable 25 Gram(s) IV Push once  dextrose 50% Injectable 12.5 Gram(s) IV Push once  dextrose 50% Injectable 25 Gram(s) IV Push once  doxercalciferol Injectable 2 MICROGram(s) IV Push <User Schedule>  epoetin mana-epbx (RETACRIT) Injectable 19840 Unit(s) IV Push <User Schedule>  ergocalciferol 20852 Unit(s) Oral <User Schedule>  heparin   Injectable 5000 Unit(s) SubCutaneous every 8 hours  insulin glargine SubCutaneous Injection (LANTUS) - Peds 5 Unit(s) SubCutaneous at bedtime  insulin lispro (ADMELOG) corrective regimen sliding scale   SubCutaneous three times a day before meals  melatonin 5 milliGRAM(s) Oral at bedtime  metoprolol succinate ER 25 milliGRAM(s) Oral two times a day  piperacillin/tazobactam IVPB.. 2.25 Gram(s) IV Intermittent every 8 hours  sertraline 50 milliGRAM(s) Oral daily  sevelamer carbonate 1600 milliGRAM(s) Oral three times a day        RADIOLOGY & ADDITIONAL TESTS:      < from: Xray Chest 1 View- PORTABLE-Routine (Xray Chest 1 View- PORTABLE-Routine in AM.) (03.04.21 @ 05:41) >  Increased bibasilar opacities and effusions, left greater than right. No pneumothorax.    < end of copied text >  < from: CT Pelvis w/ IV Cont (03.02.21 @ 18:03) >  Left perirectal fluid collection measures approximately 8.6 x 6.0 x 5.6 cm    Severe uterine and rectal prolapse.    Mild-to-moderate ascites in the visualized pelvis.      MICROBIOLOGY DATA:    miCulture - Abscess with Gram Stain (03.03.21 @ 14:07)   Specimen Source: .Abscess LEFT BUTTOCK   Culture Results: No growth     Culture - Abscess with Gram Stain (03.03.21 @ 14:05)   Specimen Source: .Abscess LEFT BUTTOCK   Culture Results: No growth     Respiratory Viral Panel with COVID-19 by TREVA (03.02.21 @ 15:40)   Rapid RVP Result: NotDetec   SARS-CoV-2: NotDetec:             
[facility] NEPHROLOGY FOLLOW UP NOTE  --------------------------------------------------------------------------------  24 hour events/subjective: Patient examined. Appears comfortable.    PAST HISTORY  --------------------------------------------------------------------------------  No significant changes to PMH, PSH, FHx, SHx, unless otherwise noted    ALLERGIES & MEDICATIONS  --------------------------------------------------------------------------------  Allergies    No Known Allergies    Intolerances      Standing Inpatient Medications  aMIOdarone    Tablet 200 milliGRAM(s) Oral daily  aspirin  chewable 81 milliGRAM(s) Oral daily  atorvastatin 20 milliGRAM(s) Oral at bedtime  chlorhexidine 4% Liquid 1 Application(s) Topical <User Schedule>  dextrose 40% Gel 15 Gram(s) Oral once  dextrose 50% Injectable 25 Gram(s) IV Push once  dextrose 50% Injectable 12.5 Gram(s) IV Push once  dextrose 50% Injectable 25 Gram(s) IV Push once  doxercalciferol Injectable 2 MICROGram(s) IV Push <User Schedule>  epoetin mana-epbx (RETACRIT) Injectable 80116 Unit(s) IV Push <User Schedule>  ergocalciferol 60493 Unit(s) Oral <User Schedule>  heparin   Injectable 5000 Unit(s) SubCutaneous every 8 hours  insulin glargine SubCutaneous Injection (LANTUS) - Peds 5 Unit(s) SubCutaneous at bedtime  insulin lispro (ADMELOG) corrective regimen sliding scale   SubCutaneous three times a day before meals  melatonin 5 milliGRAM(s) Oral at bedtime  metoprolol succinate ER 25 milliGRAM(s) Oral two times a day  piperacillin/tazobactam IVPB.. 2.25 Gram(s) IV Intermittent every 8 hours  sertraline 50 milliGRAM(s) Oral daily  sevelamer carbonate 1600 milliGRAM(s) Oral three times a day    PRN Inpatient Medications  acetaminophen   Tablet .. 650 milliGRAM(s) Oral every 6 hours PRN  aluminum hydroxide/magnesium hydroxide/simethicone Suspension 30 milliLiter(s) Oral every 4 hours PRN  oxyCODONE    IR 5 milliGRAM(s) Oral every 6 hours PRN  zolpidem 5 milliGRAM(s) Oral at bedtime PRN      VITALS/PHYSICAL EXAM  --------------------------------------------------------------------------------  T(C): 36 (03-09-21 @ 05:00), Max: 36.7 (03-08-21 @ 14:49)  HR: 61 (03-09-21 @ 05:00) (61 - 64)  BP: 147/67 (03-09-21 @ 05:00) (123/58 - 147/67)  RR: 16 (03-09-21 @ 05:00) (16 - 16)  SpO2: --  Wt(kg): --        03-08-21 @ 07:01  -  03-09-21 @ 07:00  --------------------------------------------------------  IN: 0 mL / OUT: 300 mL / NET: -300 mL      Physical Exam:  	Gen: NAD  	Pulm: CTA B/L  	CV: RRR, S1S2  	Abd: +BS, soft, nontender/nondistended  	: No suprapubic tenderness  	LE: Warm, no edema  	Vascular access: TDC    LABS/STUDIES  --------------------------------------------------------------------------------    Creatinine Trend:  SCr 4.1 [03-07 @ 11:03]  SCr 4.6 [03-05 @ 07:06]  SCr 6.3 [03-04 @ 05:17]  SCr 5.4 [03-03 @ 06:45]  SCr 4.6 [03-02 @ 15:57]        Iron 55, TIBC 167, %sat 33      [05-27-20 @ 13:22]  Ferritin 636      [05-27-20 @ 13:22]  HbA1c 5.7      [11-09-19 @ 01:20]
HPI:    PAST MEDICAL & SURGICAL HISTORY:  Chronic indwelling Castellon catheter    Uterine prolapse    End stage renal disease  on dialysis Mon, Wed, Fr    Pleural effusion due to congestive heart failure    Congestive heart failure (CHF)    Diabetes    Chronic indwelling Castellon catheter    History of thoracentesis    S/P CABG (coronary artery bypass graft)  11/2019    History of repair of hip fracture  2019        Allergies    No Known Allergies          MEDICATIONS  (STANDING):  aMIOdarone    Tablet 200 milliGRAM(s) Oral daily  aspirin  chewable 81 milliGRAM(s) Oral daily  atorvastatin 20 milliGRAM(s) Oral at bedtime  chlorhexidine 4% Liquid 1 Application(s) Topical <User Schedule>  dextrose 40% Gel 15 Gram(s) Oral once  dextrose 50% Injectable 25 Gram(s) IV Push once  dextrose 50% Injectable 12.5 Gram(s) IV Push once  dextrose 50% Injectable 25 Gram(s) IV Push once  ergocalciferol 16246 Unit(s) Oral <User Schedule>  heparin   Injectable 5000 Unit(s) SubCutaneous every 8 hours  insulin glargine SubCutaneous Injection (LANTUS) - Peds 5 Unit(s) SubCutaneous at bedtime  insulin lispro (ADMELOG) corrective regimen sliding scale   SubCutaneous three times a day before meals  melatonin 5 milliGRAM(s) Oral at bedtime  metolazone 5 milliGRAM(s) Oral daily  metoprolol succinate ER 25 milliGRAM(s) Oral two times a day  piperacillin/tazobactam IVPB.. 2.25 Gram(s) IV Intermittent every 8 hours  sertraline 50 milliGRAM(s) Oral daily  sevelamer carbonate 1600 milliGRAM(s) Oral three times a day    MEDICATIONS  (PRN):  acetaminophen   Tablet .. 650 milliGRAM(s) Oral every 6 hours PRN Temp greater or equal to 38C (100.4F), Mild Pain (1 - 3)  oxyCODONE    IR 5 milliGRAM(s) Oral every 6 hours PRN Moderate Pain (4 - 6)      General:	no fever, weight loss,  chills  Skin: no rash, ulcers  Ophthalmologic: no visual changes  ENMT:	no sore throat  Respiratory and Thorax: no cough, wheeze,  sob  Cardiovascular:	no chest pain, palpitations, dizziness  Gastrointestinal:	no nausea, vomiting, diarrhea, abd pain  Genitourinary:	no dysuria, hematuria  Musculoskeletal:	no joint pains  Neurological:	 no speech disturbance, focal weakness, numbness  Psychiatric:	no depression, anxiety, psychosis  Hematology/Lymphatics:	no anemia  Endocrine:	no polyuria, polydipsia        Vital Signs Last 24 Hrs    T(F): 96.7 (05 Mar 2021 05:00), Max: 98.6 (04 Mar 2021 19:05)  HR: 66 (05 Mar 2021 05:00) (66 - 83)  BP: 105/56 (05 Mar 2021 05:00) (105/56 - 139/70)  BP(mean): 90 (04 Mar 2021 20:31) (79 - 98)  RR: 21 (05 Mar 2021 05:00) (16 - 39)  SpO2: 100% (04 Mar 2021 20:31) (87% - 100%)    PHYSICAL EXAM:      Constitutional: A&Ox4  Respiratory: cta b/l  Cardiovascular: s1 s2 rrr  Gastrointestinal: soft nt  nd + bs no rebound or guarding  Genitourinary: no cva tenderness  Extremities: normal rom, no edema, calf tenderness  Neurological:no focal deficits  Skin: left buttock dressing is c/d/i                            8.9    7.04  )-----------( 187      ( 05 Mar 2021 07:06 )             28.9       03-05    140  |  99  |  45<H>  ----------------------------<  63<L>  4.3   |  28  |  4.6<HH>    Ca    8.7      05 Mar 2021 07:06  Phos  5.4     03-05  Mg     2.5     03-04    TPro  5.4<L>  /  Alb  3.3<L>  /  TBili  0.2  /  DBili  x   /  AST  10  /  ALT  7   /  AlkPhos  92  03-04            
S; Pt doing well. Awaiting [facility] arrangements for wound care and HD today, then to be D/C'd home  O; Vital Signs Last 24 Hrs  T(C): 36 (09 Mar 2021 05:00), Max: 36.7 (08 Mar 2021 14:49)  T(F): 96.8 (09 Mar 2021 05:00), Max: 98 (08 Mar 2021 14:49)  HR: 61 (09 Mar 2021 05:00) (61 - 64)  BP: 147/67 (09 Mar 2021 05:00) (123/58 - 147/67)  BP(mean): --  RR: 16 (09 Mar 2021 05:00) (16 - 16)  SpO2: --    MEDICATIONS  (STANDING):  aMIOdarone    Tablet 200 milliGRAM(s) Oral daily  aspirin  chewable 81 milliGRAM(s) Oral daily  atorvastatin 20 milliGRAM(s) Oral at bedtime  chlorhexidine 4% Liquid 1 Application(s) Topical <User Schedule>  dextrose 40% Gel 15 Gram(s) Oral once  dextrose 50% Injectable 25 Gram(s) IV Push once  dextrose 50% Injectable 12.5 Gram(s) IV Push once  dextrose 50% Injectable 25 Gram(s) IV Push once  doxercalciferol Injectable 2 MICROGram(s) IV Push <User Schedule>  epoetin mana-epbx (RETACRIT) Injectable 57417 Unit(s) IV Push <User Schedule>  ergocalciferol 07734 Unit(s) Oral <User Schedule>  heparin   Injectable 5000 Unit(s) SubCutaneous every 8 hours  insulin glargine SubCutaneous Injection (LANTUS) - Peds 5 Unit(s) SubCutaneous at bedtime  insulin lispro (ADMELOG) corrective regimen sliding scale   SubCutaneous three times a day before meals  melatonin 5 milliGRAM(s) Oral at bedtime  metoprolol succinate ER 25 milliGRAM(s) Oral two times a day  piperacillin/tazobactam IVPB.. 2.25 Gram(s) IV Intermittent every 8 hours  sertraline 50 milliGRAM(s) Oral daily  sevelamer carbonate 1600 milliGRAM(s) Oral three times a day    MEDICATIONS  (PRN):  acetaminophen   Tablet .. 650 milliGRAM(s) Oral every 6 hours PRN Temp greater or equal to 38C (100.4F), Mild Pain (1 - 3)  aluminum hydroxide/magnesium hydroxide/simethicone Suspension 30 milliLiter(s) Oral every 4 hours PRN Dyspepsia  oxyCODONE    IR 5 milliGRAM(s) Oral every 6 hours PRN Moderate Pain (4 - 6)  zolpidem 5 milliGRAM(s) Oral at bedtime PRN Insomnia      EXAM:  lungs: cta  cvs: s1s2  abd: soft, NT/ND.   buttock: left teddy-rectal incision without erythema/induration/purulence/necrosis; +mild tenderness; wound repacked    LABS: no new results
GUS WILBURN  70y, Female  Allergy: No Known Allergies      LOS  3d    CHIEF COMPLAINT: I&D (04 Mar 2021 13:14)      INTERVAL EVENTS/HPI  - No acute events overnight  - T(F): , Max: 98.6 (03-04-21 @ 19:05)  - Denies any worsening symptoms  - Tolerating medication  - WBC Count: 7.04 (03-05-21 @ 07:06)  WBC Count: 7.82 (03-04-21 @ 05:17)     - Creatinine, Serum: 4.6 (03-05-21 @ 07:06)  Creatinine, Serum: 6.3 (03-04-21 @ 05:17)       ROS  General: Denies rigors, nightsweats  HEENT: Denies headache, rhinorrhea, sore throat, eye pain  CV: Denies CP, palpitations  PULM: Denies wheezing, hemoptysis  GI: Denies hematemesis, hematochezia, melena  : Denies discharge, hematuria  MSK: Denies arthralgias, myalgias  SKIN: Denies rash, lesions  NEURO: Denies paresthesias, weakness  PSYCH: Denies depression, anxiety    VITALS:  T(F): 97, Max: 98.6 (03-04-21 @ 19:05)  HR: 77  BP: 131/63  RR: 21Vital Signs Last 24 Hrs  T(C): 36.1 (05 Mar 2021 14:07), Max: 37 (04 Mar 2021 19:05)  T(F): 97 (05 Mar 2021 14:07), Max: 98.6 (04 Mar 2021 19:05)  HR: 77 (05 Mar 2021 14:07) (66 - 83)  BP: 131/63 (05 Mar 2021 14:07) (105/56 - 131/63)  BP(mean): 90 (04 Mar 2021 20:31) (79 - 90)  RR: 21 (05 Mar 2021 05:00) (18 - 27)  SpO2: 100% (04 Mar 2021 20:31) (87% - 100%)    PHYSICAL EXAM:  Gen: NAD, resting in bed  HEENT: Normocephalic, atraumatic  Neck: supple, no lymphadenopathy  CV: Regular rate & regular rhythm  Lungs: decreased BS at bases, no fremitus  Abdomen: Soft, BS present  Ext: Warm, well perfused  Neuro: non focal, awake  Skin: perirectal wound dressed  Lines: no phlebitis    FH: Non-contributory  Social Hx: Non-contributory    TESTS & MEASUREMENTS:                        8.9    7.04  )-----------( 187      ( 05 Mar 2021 07:06 )             28.9     03-05    140  |  99  |  45<H>  ----------------------------<  63<L>  4.3   |  28  |  4.6<HH>    Ca    8.7      05 Mar 2021 07:06  Phos  5.4     03-05  Mg     2.5     03-04    TPro  5.4<L>  /  Alb  3.3<L>  /  TBili  0.2  /  DBili  x   /  AST  10  /  ALT  7   /  AlkPhos  92  03-04    eGFR if Non African American: 9 mL/min/1.73M2 (03-05-21 @ 07:06)  eGFR if African American: 11 mL/min/1.73M2 (03-05-21 @ 07:06)    LIVER FUNCTIONS - ( 04 Mar 2021 05:17 )  Alb: 3.3 g/dL / Pro: 5.4 g/dL / ALK PHOS: 92 U/L / ALT: 7 U/L / AST: 10 U/L / GGT: x               Culture - Abscess with Gram Stain (collected 03-03-21 @ 14:07)  Source: .Abscess LEFT BUTTOCK  Preliminary Report (03-04-21 @ 20:33):    No growth    Culture - Abscess with Gram Stain (collected 03-03-21 @ 14:05)  Source: .Abscess LEFT BUTTOCK  Preliminary Report (03-04-21 @ 20:30):    No growth        Lactate, Blood: 1.2 mmol/L (03-02-21 @ 15:57)      INFECTIOUS DISEASES TESTING  Rapid RVP Result: NotDetec (03-02-21 @ 15:40)  MRSA PCR Result.: Positive (07-11-20 @ 16:46)  COVID-19 PCR: NotDetec (07-10-20 @ 20:35)  MRSA PCR Result.: Negative (05-15-20 @ 05:20)  COVID-19 PCR: NotDetec (05-11-20 @ 19:10)      INFLAMMATORY MARKERS      RADIOLOGY & ADDITIONAL TESTS:  I have personally reviewed the last available Chest xray  CXR  Xray Chest 1 View- PORTABLE-Urgent:   EXAM:  XR CHEST PORTABLE URGENT 1V            PROCEDURE DATE:  03/02/2021            INTERPRETATION:  Clinical History / Reason for exam: Preoperative    Comparison : Chest radiograph 9/17/2020.    Technique/Positioning: Frontal and lateral chest radiographs.    Findings:    Support devices: Right-sided dialysis catheter tip overlying the right atrium.    Cardiac/mediastinum/hilum: Cardiomegaly. Atrial appendage clipping again noted. Status post median sternotomy.    Lung parenchyma/Pleura: Pulmonary vascular congestion. Patchy right basilar opacity. Small to moderate left pleural effusion.. No pneumothorax.    Skeleton/soft tissues: Bony demineralization. Chronic appearing left-sided rib fractures. Left chest/upper extremity surgical clips.    Impression:    Pulmonary vascular congestion. Patchy right basilar opacity. Small to moderate left pleural effusion. Radiographic follow-up recommended.                  OSCAR ROBBINS MD; Attending Radiologist  This document has been electronically signed. Mar  2 2021  5:07PM (03-02-21 @ 15:44)      CT      CARDIOLOGY TESTING  12 Lead ECG:   Ventricular Rate 72 BPM    Atrial Rate 72 BPM    P-R Interval 240 ms    QRS Duration 176 ms    Q-T Interval 482 ms    QTC Calculation(Bazett) 527 ms    P Axis 60 degrees    R Axis -61 degrees    T Axis 102 degrees    Diagnosis Line Sinus rhythm with 1st degree A-V block  Left axis deviation  Right bundle branch block  Left ventricular hypertrophy with repolarization abnormality  Anterolateral infarct , age undetermined  Abnormal ECG    Confirmed by JIL CORDOBA MD ([id number]) on 3/4/2021 11:33:46 AM (03-03-21 @ 15:33)  12 Lead ECG:   Ventricular Rate 71 BPM    Atrial Rate 71 BPM    P-R Interval 252 ms    QRS Duration 156 ms    Q-T Interval 442 ms    QTC Calculation(Bazett) 480 ms    P Axis 72 degrees    R Axis -68 degrees    T Axis 134 degrees    Diagnosis Line Sinus rhythm with 1st degree A-V block  Left axis deviation  Right bundle branch block  Left ventricular hypertrophy with repolarization abnormality  Inferior infarct , age undetermined  Anterolateral infarct , age undetermined  Abnormal ECG    Confirmed by JIL CORDOBA MD ([id number]) on 3/4/2021 11:33:28 AM (03-03-21 @ 15:33)      MEDICATIONS  aMIOdarone    Tablet 200 Oral daily  aspirin  chewable 81 Oral daily  atorvastatin 20 Oral at bedtime  chlorhexidine 4% Liquid 1 Topical <User Schedule>  dextrose 40% Gel 15 Oral once  dextrose 50% Injectable 25 IV Push once  dextrose 50% Injectable 12.5 IV Push once  dextrose 50% Injectable 25 IV Push once  ergocalciferol 41089 Oral <User Schedule>  heparin   Injectable 5000 SubCutaneous every 8 hours  insulin glargine SubCutaneous Injection (LANTUS) - Peds 5 SubCutaneous at bedtime  insulin lispro (ADMELOG) corrective regimen sliding scale  SubCutaneous three times a day before meals  melatonin 5 Oral at bedtime  metolazone 5 Oral daily  metoprolol succinate ER 25 Oral two times a day  piperacillin/tazobactam IVPB.. 2.25 IV Intermittent every 8 hours  sertraline 50 Oral daily  sevelamer carbonate 1600 Oral three times a day      WEIGHT  Weight (kg): 58.3 (03-03-21 @ 13:42)  Creatinine, Serum: 4.6 mg/dL (03-05-21 @ 07:06)      ANTIBIOTICS:  piperacillin/tazobactam IVPB.. 2.25 Gram(s) IV Intermittent every 8 hours      All available historical records have been reviewed      
Patient is a 70y old  Female who presents with a chief complaint of L perirectal abscess (03 Mar 2021 08:40)        HPI:  70  yr old female with left buttock pain that began 2 days ago; associated with external hemorrhoids (non bleeding). No fevers, chills; drainage from area. +pain with defecation. Pt known to Dr. Mota - saw him in office and sent here to R/O buttock abscess.  SHe is on dialysis on M/W/F - Dr. Peguero.  She takes ASA 81 mg daily.  Cardiologist is Dr. Mancia    +colonscopy last year - (Dr. Solis) - +rectal; ulcer (02 Mar 2021 17:02)      Pt evaluated on AM rounds. I discussed the case in detail case with the hospitalist . I reviewed the radiology tests and hospital record prior to visiting the patient.    Interval Events: No overnight events.    REVIEW OF SYSTEMS:   see HPI      OBJECTIVE:  ICU Vital Signs Last 24 Hrs  T(C): 35.3 (04 Mar 2021 07:01), Max: 36.9 (03 Mar 2021 15:34)  T(F): 95.5 (04 Mar 2021 07:01), Max: 98.5 (03 Mar 2021 15:34)  HR: 62 (04 Mar 2021 05:00) (59 - 72)  BP: 105/53 (04 Mar 2021 05:00) (87/50 - 126/58)  BP(mean): 75 (04 Mar 2021 05:00) (64 - 75)  ABP: --  ABP(mean): --  RR: 16 (04 Mar 2021 07:01) (16 - 30)  SpO2: 99% (04 Mar 2021 05:00) (97% - 100%)        03-03 @ 07:01  -  03-04 @ 07:00  --------------------------------------------------------  IN: 730 mL / OUT: 30 mL / NET: 700 mL    03-04 @ 07:01  -  03-04 @ 07:19  --------------------------------------------------------  IN: 0 mL / OUT: 0 mL / NET: 0 mL      CAPILLARY BLOOD GLUCOSE      POCT Blood Glucose.: 150 mg/dL (03 Mar 2021 22:00)        PHYSICAL EXAM:     · CONSTITUTIONAL:   not septic appearing,   well nourished,   NAD    · ENMT:   Airway patent,   Nasal mucosa clear.  Mouth with normal mucosa.   No thrush    · EYES:   Clear bilaterally,   pupils equal,   round and reactive to light.    · CARDIAC:   Normal rate,   regular rhythm.    Heart sounds S1, S2.   No murmurs, no rubs or gallops on auscultation  no edema        CAROTID:   normal systolic impulse  no bruits    · RESPIRATORY:   rales  normal chest expansion  no retractions or use of accessory muscles  palpation of chest is normal with no fremitus  percussion of chest demonstrates no hyperresonance or dullness    · GASTROINTESTINAL:  Abdomen soft,   non-tender,   + BS  liver/spleen not palpable    · MUSCULOSKELETAL:   no clubbing, cyanosis      · NEUROLOGICAL:   awake alert oriented  no obvious cranial nerve abnormalities      · SKIN:   Skin normal color for race,   warm, dry   No evidence of rash.        · HEME LYMPH:   no splenomegaly.  No cervical  lymphadenopathy.  no inguinal lymphadenopathy    HOSPITAL MEDICATIONS:  MEDICATIONS  (STANDING):  aMIOdarone    Tablet 200 milliGRAM(s) Oral daily  aspirin  chewable 81 milliGRAM(s) Oral daily  atorvastatin 40 milliGRAM(s) Oral at bedtime  dextrose 40% Gel 15 Gram(s) Oral once  dextrose 50% Injectable 25 Gram(s) IV Push once  dextrose 50% Injectable 12.5 Gram(s) IV Push once  dextrose 50% Injectable 25 Gram(s) IV Push once  ergocalciferol 62198 Unit(s) Oral <User Schedule>  furosemide    Tablet 80 milliGRAM(s) Oral two times a day  hydrALAZINE 25 milliGRAM(s) Oral three times a day  insulin glargine SubCutaneous Injection (LANTUS) - Peds 5 Unit(s) SubCutaneous at bedtime  insulin lispro (ADMELOG) corrective regimen sliding scale   SubCutaneous three times a day before meals  melatonin 5 milliGRAM(s) Oral at bedtime  metolazone 5 milliGRAM(s) Oral daily  metoprolol succinate ER 25 milliGRAM(s) Oral two times a day  pantoprazole  Injectable 40 milliGRAM(s) IV Push daily  piperacillin/tazobactam IVPB.. 2.25 Gram(s) IV Intermittent every 12 hours  sertraline 50 milliGRAM(s) Oral daily  sevelamer carbonate 1600 milliGRAM(s) Oral three times a day    MEDICATIONS  (PRN):  acetaminophen   Tablet .. 650 milliGRAM(s) Oral every 6 hours PRN Temp greater or equal to 38C (100.4F), Mild Pain (1 - 3)  oxyCODONE    IR 5 milliGRAM(s) Oral every 6 hours PRN Moderate Pain (4 - 6)    sodium chloride 0.9%.: Solution, 1000 milliLiter(s) infuse at 30 mL/Hr  sodium chloride 0.9% Bolus:   1000 milliLiter(s), IV Bolus, once, infuse over 30 Minute(s), Stop After 1 Doses      LABS:                        7.3    7.82  )-----------( 188      ( 04 Mar 2021 05:17 )             24.5     03-03    139  |  98  |  51<H>  ----------------------------<  92  4.1   |  24  |  5.4<HH>    Ca    9.0      03 Mar 2021 06:45  Mg     2.4     03-02    TPro  6.5  /  Alb  3.5  /  TBili  0.2  /  DBili  x   /  AST  24  /  ALT  10  /  AlkPhos  112  03-02    PT/INR - ( 02 Mar 2021 15:57 )   PT: 13.80 sec;   INR: 1.20 ratio         PTT - ( 02 Mar 2021 15:57 )  PTT:27.1 sec        CARDIAC MARKERS ( 02 Mar 2021 15:57 )  x     / 0.27 ng/mL / x     / x     / x                    RADIOLOGY: Today I personally reviewed latest CXR and other pertinent films.          
SUBJECTIVE:    Patient is a 70y old Female who presents with a chief complaint of L perirectal abscess (03 Mar 2021 08:40)    Currently admitted to medicine with the primary diagnosis of Abscess of buttock, left       Today is hospital day 2d. This morning she is resting comfortably in bed and reports no new issues or overnight events. Scheduled for HD today     PAST MEDICAL & SURGICAL HISTORY  Chronic indwelling Castellon catheter    Uterine prolapse    End stage renal disease  on dialysis Mon, Wed, Fr    Pleural effusion due to congestive heart failure    Congestive heart failure (CHF)    Diabetes    Chronic indwelling Castellon catheter    History of thoracentesis    S/P CABG (coronary artery bypass graft)  11/2019    History of repair of hip fracture  2019      SOCIAL HISTORY:  Negative for smoking/alcohol/drug use.     ALLERGIES:  No Known Allergies    MEDICATIONS:  STANDING MEDICATIONS  aMIOdarone    Tablet 200 milliGRAM(s) Oral daily  aspirin  chewable 81 milliGRAM(s) Oral daily  atorvastatin 20 milliGRAM(s) Oral at bedtime  chlorhexidine 4% Liquid 1 Application(s) Topical <User Schedule>  dextrose 40% Gel 15 Gram(s) Oral once  dextrose 50% Injectable 25 Gram(s) IV Push once  dextrose 50% Injectable 12.5 Gram(s) IV Push once  dextrose 50% Injectable 25 Gram(s) IV Push once  ergocalciferol 96642 Unit(s) Oral <User Schedule>  heparin   Injectable 5000 Unit(s) SubCutaneous every 8 hours  insulin glargine SubCutaneous Injection (LANTUS) - Peds 5 Unit(s) SubCutaneous at bedtime  insulin lispro (ADMELOG) corrective regimen sliding scale   SubCutaneous three times a day before meals  melatonin 5 milliGRAM(s) Oral at bedtime  metolazone 5 milliGRAM(s) Oral daily  metoprolol succinate ER 25 milliGRAM(s) Oral two times a day  piperacillin/tazobactam IVPB.. 2.25 Gram(s) IV Intermittent every 8 hours  sertraline 50 milliGRAM(s) Oral daily  sevelamer carbonate 1600 milliGRAM(s) Oral three times a day    PRN MEDICATIONS  acetaminophen   Tablet .. 650 milliGRAM(s) Oral every 6 hours PRN  oxyCODONE    IR 5 milliGRAM(s) Oral every 6 hours PRN    VITALS:   T(F): 96.6  HR: 73  BP: 139/70  RR: 18  SpO2: 100%    LABS:                        7.3    7.82  )-----------( 188      ( 04 Mar 2021 05:17 )             24.5     03-04    137  |  99  |  62<HH>  ----------------------------<  112<H>  4.6   |  24  |  6.3<HH>    Ca    8.6      04 Mar 2021 05:17  Mg     2.5     03-04    TPro  5.4<L>  /  Alb  3.3<L>  /  TBili  0.2  /  DBili  x   /  AST  10  /  ALT  7   /  AlkPhos  92  03-04    PT/INR - ( 02 Mar 2021 15:57 )   PT: 13.80 sec;   INR: 1.20 ratio         PTT - ( 02 Mar 2021 15:57 )  PTT:27.1 sec          CARDIAC MARKERS ( 02 Mar 2021 15:57 )  x     / 0.27 ng/mL / x     / x     / x          RADIOLOGY:    PHYSICAL EXAM:  GEN: No acute distress  LUNGS: Clear to auscultation bilaterally   HEART: S1/S2 present. RRR.   ABD: Soft, non-tender, non-distended. Bowel sounds present. Buttock with clean dressing  EXT: NC/NC/NE/2+PP/GEORGE  NEURO: AAOX3    
oral
.  s/p I&D of Left buttock abscess -- (POD # 3)    Patient seen & examined.  No acute events noted overnight.  Patient reports feels much better after I&D.  Packing changed yesterday by surgery team and due for a packing dressing change tomorrow.  Patient tolerates diet well.  + Flatus.     Patient denies subjective fever, chills, tremors, N/V/D, CP or SOB.           MEDICATIONS  (STANDING):  aMIOdarone    Tablet 200 milliGRAM(s) Oral daily  aspirin  chewable 81 milliGRAM(s) Oral daily  atorvastatin 20 milliGRAM(s) Oral at bedtime  chlorhexidine 4% Liquid 1 Application(s) Topical <User Schedule>  dextrose 40% Gel 15 Gram(s) Oral once  dextrose 50% Injectable 25 Gram(s) IV Push once  dextrose 50% Injectable 12.5 Gram(s) IV Push once  dextrose 50% Injectable 25 Gram(s) IV Push once  doxercalciferol Injectable 2 MICROGram(s) IV Push <User Schedule>  epoetin mana-epbx (RETACRIT) Injectable 45728 Unit(s) IV Push <User Schedule>  ergocalciferol 03589 Unit(s) Oral <User Schedule>  heparin   Injectable 5000 Unit(s) SubCutaneous every 8 hours  insulin glargine SubCutaneous Injection (LANTUS) - Peds 5 Unit(s) SubCutaneous at bedtime  insulin lispro (ADMELOG) corrective regimen sliding scale   SubCutaneous three times a day before meals  melatonin 5 milliGRAM(s) Oral at bedtime  metoprolol succinate ER 25 milliGRAM(s) Oral two times a day  piperacillin/tazobactam IVPB.. 2.25 Gram(s) IV Intermittent every 8 hours  sertraline 50 milliGRAM(s) Oral daily  sevelamer carbonate 1600 milliGRAM(s) Oral three times a day    MEDICATIONS  (PRN):  acetaminophen   Tablet .. 650 milliGRAM(s) Oral every 6 hours PRN Temp greater or equal to 38C (100.4F), Mild Pain (1 - 3)  aluminum hydroxide/magnesium hydroxide/simethicone Suspension 30 milliLiter(s) Oral every 4 hours PRN Dyspepsia  oxyCODONE    IR 5 milliGRAM(s) Oral every 6 hours PRN Moderate Pain (4 - 6)          Vital Signs Last 24 Hrs  T(C): 36.2 (06 Mar 2021 05:00), Max: 36.2 (06 Mar 2021 05:00)  T(F): 97.1 (06 Mar 2021 05:00), Max: 97.1 (06 Mar 2021 05:00)  HR: 68 (06 Mar 2021 12:10) (63 - 83)  BP: 138/81 (06 Mar 2021 12:10) (118/57 - 138/81)  RR: 18 (06 Mar 2021 12:10) (18 - 18)        Physical Exam:  General:  WD, WN, conversant in NAD.   Chest:  Clear to auscultation bilaterally, Equal expansion bilaterally, equal breath sounds, No W/R/R.  CV:  S1 & S2, RRR, No M/R/G.   Abdomen:  + Bowel sounds, soft, no distention, non-tender,  no rebound/guarding or peritoneal signs.    Extremities:  No C/C/E,  No calf tenderness B/L.    Left buttock abscess:  Dressing and packing in place.  No active bleeding noted.  No erythema noted.           LABS:                         8.9    7.04  )-----------( 187      ( 05 Mar 2021 07:06 )             28.9     03-05    140  |  99  |  45<H>  ----------------------------<  63<L>  4.3   |  28  |  4.6<HH>    Ca    8.7      05 Mar 2021 07:06  Phos  5.4     03-05

## 2023-08-18 NOTE — CONSULT NOTE ADULT - PROBLEM SELECTOR PROBLEM 4
Increase your omeprazole to twice a day for a week.  Return if worsening discomfort or any concerns.  Call your primary care provider today to arrange follow-up and further evaluation.  Someone from the chest pain clinic will call you as well to arrange follow-up with them.   Right leg pain

## 2023-09-07 NOTE — PROCEDURE NOTE - NSICDXPROCEDURE_GEN_ALL_CORE_FT
Oriented - self; Oriented - place; Oriented - time PROCEDURES:  US Doppler guided insertion of central venous catheter 02-Nov-2019 19:26:50  Joseline Josue

## 2023-09-13 NOTE — DISCHARGE NOTE PROVIDER - NSDCCONDITION_GEN_ALL_CORE
Approval scanned in media.
Document scanned into media, PA request for Tirosint. Please submit to PA department,  MyChart note has the options which medications were tried and failed. The best treatment option for the patient answer is that alternatives not be as effective and would likely cause adverse effects. Let me know if any questions.
Noted.
Noted.
Submitted PA for Tirosint  [facility]ia [facility] Key: [id number] STATUS: PENDING. Follow up done daily; if no response in three days we will refax for status check. If another three days goes by with no response we will call the insurance for status.
TREY Valentino with [facility] PA for Tirosint was approved. Approval #[id number] from 9/11/23 to 9/12/24. Patient has been informed.
Stable

## 2023-10-19 NOTE — REVIEW OF SYSTEMS
Unit RN to OR RN [Fever] : no fever [Headache] : no headache [Blurry Vision] : no blurred vision [Sore Throat] : no sore throat [Chest  Pressure] : no chest pressure [Chest Pain] : no chest pain [Lower Ext Edema] : no extremity edema [Cough] : no cough [Wheezing] : no wheezing [Coughing Up Blood] : no hemoptysis [Nausea] : no nausea [Vomiting] : no vomiting

## 2023-11-08 NOTE — PROGRESS NOTE ADULT - SUBJECTIVE AND OBJECTIVE BOX
Interval history: Patient found in bed sleeping in NAD. -110 with HR 70s in atrial flutter.         HPI       67 y/o F with h/o DM, recent hip replacement admitted with c/o sob that started acutely 6 days ago. Per family, patient was lethargic, sob and complaint of nausea. She also felt chest discomfort that radiated to the back. Upon admission, an echocardiogram showed LVEF ~ 15% from ~ 45% one month earlier. Her troponin were elevated and pro-BNP was 20 k. A lHC showed triple vessel disease with 80% disease of LM.      Now she is s/p CABG and NUBIA clipping.       PMH: DM, Hip fracture     Social: Denies ETOH, smoking     FH: father  from heart disease in his 50s, mother  in her 30s from cancer No

## 2023-11-13 NOTE — PROGRESS NOTE ADULT - SUBJECTIVE AND OBJECTIVE BOX
GUS WILBURN  68y, Female  Allergy: No Known Allergies      CHIEF COMPLAINT: mechanical fall (09 Aug 2019 10:32)      INTERVAL EVENTS/HPI  - No acute events overnight  - T(F): , Max: 97.3 (08-08-19 @ 16:22)  - Denies any worsening symptoms  - Tolerating medication  - WBC Count: 4.93 K/uL (08-09-19 @ 06:36)      ROS  General: Denies rigors, nightsweats  HEENT: Denies headache, rhinorrhea, sore throat, eye pain  CV: Denies CP, palpitations  PULM: Denies SOB, wheezing  GI: Denies abdominal pain, hematochezia/melena  : Denies dysuria, hematuria  MSK: Denies arthralgias, myalgias  SKIN: Denies rash, lesions  NEURO: Denies paresthesias, weakness  PSYCH: Denies depression, anxiety    VITALS:  T(F): 96.8, Max: 97.3 (08-08-19 @ 16:22)  HR: 84  BP: 154/67  RR: 18Vital Signs Last 24 Hrs  T(C): 36 (09 Aug 2019 07:30), Max: 36.3 (08 Aug 2019 16:22)  T(F): 96.8 (09 Aug 2019 07:30), Max: 97.3 (08 Aug 2019 16:22)  HR: 84 (09 Aug 2019 07:30) (77 - 84)  BP: 154/67 (09 Aug 2019 07:30) (124/60 - 154/67)  BP(mean): --  RR: 18 (09 Aug 2019 07:30) (18 - 18)  SpO2: --    PHYSICAL EXAM:  Gen: NAD, resting in bed  HEENT: Normocephalic, atraumatic  Neck: supple, no lymphadenopathy  CV: Regular rate & regular rhythm  Lungs: decreased BS at bases, no fremitus  Abdomen: Soft, BS present  Ext: Warm, well perfused, R groin abscess improved, small opening no purulence, mild erythema  : vaginal prolapse  Neuro: non focal, awake  Skin: no rash, no erythema  Lines: no phlebitis      FH: Non-contributory  Social Hx: Non-contributory    TESTS & MEASUREMENTS:                        7.9    4.93  )-----------( 292      ( 09 Aug 2019 06:36 )             24.4     08-09    138  |  103  |  13  ----------------------------<  85  4.2   |  23  |  0.7    Ca    9.3      09 Aug 2019 06:36  Mg     1.8     08-09    TPro  5.9<L>  /  Alb  3.0<L>  /  TBili  0.3  /  DBili  x   /  AST  20  /  ALT  10  /  AlkPhos  95  08-09    eGFR if Non African American: 90 mL/min/1.73M2 (08-09-19 @ 06:36)  eGFR if : 105 mL/min/1.73M2 (08-09-19 @ 06:36)    LIVER FUNCTIONS - ( 09 Aug 2019 06:36 )  Alb: 3.0 g/dL / Pro: 5.9 g/dL / ALK PHOS: 95 U/L / ALT: 10 U/L / AST: 20 U/L / GGT: x                     INFECTIOUS DISEASES TESTING      RADIOLOGY & ADDITIONAL TESTS:  I have personally reviewed the last Chest xray  CXR      CT      CARDIOLOGY TESTING  Transthoracic Echocardiogram:    EXAM:  2-D ECHO (TTE) COMPLETE        PROCEDURE DATE:  08/02/2019      INTERPRETATION:  REPORT:    TRANSTHORACIC ECHOCARDIOGRAM REPORT         Patient Name:   GUS WILBURN Accession #: [id number]  Medical Rec #:  [id number]    Height:      63.0 in 160.0 cm  YOB: 1950  Weight:      130.0 lb 58.97 kg  Patient Age:    68 years    BSA:         1.61 m²  Patient Gender: F           BP:          132/70 mmHg       Date of Exam:        8/2/2019 9:11:06 AM  Referring Physician: [id number] ERNESTO CATALAN  Sonographer:         ALEJANDRA  Fellow:              HANK Viera Physician:   Spring Arrington M.D.    Procedure:   2D Echo/Doppler/Color Doppler Complete.  Indications: R57.0 - Cardiogenic Shock  Diagnosis:   Cardiogenic shock - R57.0         Summary:   1. Left ventricular ejection fraction, by visual estimation, is 55 to   60%.   2. LV Ejection Fraction by Renner's Method with a biplane EF of 58 %.   3. Mildly increased LV wall thickness.   4. Spectral Doppler shows impaired relaxation pattern of left   ventricular myocardial filling (Grade I diastolic dysfunction).   5. Normal right atrial size.   6. Trivial pericardial effusion.   7. Mild mitral annular calcification.   8. Mild mitral valve regurgitation.   9. Mild thickening of the anterior and posterior mitral valve leaflets.  10. Mild aortic valve stenosis.  11. Peak transaortic gradient equals 27.0 mmHg, mean transaortic gradient   equals 10.9 mmHg, the calculated aortic valve area equals 1.34 cm² by the   continuity equation consistent with moderate aortic stenosis.    PHYSICIAN INTERPRETATION:  Left Ventricle: Left ventricular wall thickness is mildly increased. Left   ventricular ejection fraction, by visual estimation, is 55 to 60%.   Spectral Doppler shows impaired relaxation pattern of left ventricular   myocardial filling (Grade I diastolic dysfunction).  Right Ventricle: RV systolic function is normal.  Left Atrium: Normal left atrial size.  Right Atrium: Normal right atrial size.  Pericardium: Trivial pericardial effusion is present.  Mitral Valve: Mild thickening of the anterior and posterior mitral valve   leaflets. There is mild mitral annular calcification. Mild mitral valve   regurgitation is seen.  Tricuspid Valve: Structurally normal tricuspid valve, with normal leaflet   excursion.  Aortic Valve: The aortic valve is trileaflet. Mild aortic stenosis is   present. Peak transaortic gradient equals 27.0 mmHg, mean transaortic   gradient equals 10.9 mmHg, the calculated aortic valve area equals 1.34   cm² by the continuity equation consistent with moderate aortic stenosis.   No evidence of aortic valve regurgitation is seen.  Pulmonic Valve: The pulmonic valve was not well visualized. The pulmonic   valve is thickened with good excursion.No indication of pulmonic valve   regurgitation.  Pulmonary Artery: Normal pulmonary artery pressure.  Venous: The inferior vena cava is normal.       2D AND M-MODE MEASUREMENTS (normal ranges within parentheses):  Left Ventricle:                    Normal   Aorta/Left Atrium:              Normal  IVSd (2D):               1.24 cm  (0.7-1.1) LA Volume Index    23.2 ml/m²  LVPWd (2D):              1.14 cm  (0.7-1.1)  LVIDd (2D):              4.09 cm  (3.4-5.7)  LVIDs (2D):              2.86 cm  LV FS (2D):              30.0 %    (>25%)  IVSd (Mmode):            1.06 cm  (0.7-1.1)  LVPWd (Mmode):           0.88 cm  (0.7-1.1)  LVIDd (Mmode):           4.74 cm  (3.4-5.7)  LVIDs (Mmode):           3.30 cm  LV FS (Mmode):           30.3 %    (>25%)  Relative Wall Thickness   0.56     (<0.42)  Rel. Wall Thickness Mm    0.37     (<0.42)  LV Mass Index: Mmode    99.6 g/m²    SPECTRAL DOPPLER ANALYSIS:  LV DIASTOLIC FUNCTION:  MV Peak E: 0.91 m/s Decel Time: 167 msec  MV Peak A: 0.96 m/s  E/A Ratio:0.95    Aortic Valve:  AoV VMax:    2.60 m/s  AoV Area, Vmax: 1.11 cm² Vmax Indx: 0.69 cm²/m²  AoV VTI:     0.49 m    AoV Area, VTI:  1.34 cm² VTI Indx:  0.83 cm²/m²  AoV Pk Grad: 27.0 mmHg  AoV Mn Grad: 10.9 mmHg    LVOT Vmax: 0.97 m/s  LVOT VTI:  0.22 m  LVOT Diam: 1.94 cm    Mitral Valve:  MV P1/2 Time: 48.43 msec  MV Area, PHT: 4.54 cm²    Tricuspid Valve and PA/RV Systolic Pressure: TR Max Velocity: 2.22 m/s RA   Pressure: 3 mmHg RVSP/PASP: 22.8 mmHg       [id number] Spring Arrington M.D., Electronically signed on 8/2/2019 at 1:43:24   PM              *** Final ***                    SPRING ARRINGTON MD  This document has been electronically signed. Aug  2 2019  9:11AM             (08-02-19 @ 09:11)  12 Lead ECG:   Ventricular Rate 70 BPM    Atrial Rate 70 BPM    P-R Interval 170 ms    QRS Duration 128 ms    Q-T Interval 428 ms    QTC Calculation(Bezet) 462 ms    P Axis 43 degrees    R Axis -49 degrees    T Axis 46 degrees    Diagnosis Line Normal sinus rhythm  Left bundle branch block  Abnormal ECG    Confirmed by Joao Parnell ([id number]) on 8/1/2019 10:02:34 PM (08-01-19 @ 19:35)      MEDICATIONS  amLODIPine   Tablet 5  ampicillin/sulbactam  IVPB 3  aspirin  chewable 81  atorvastatin 20  chlorhexidine 4% Liquid 1  dextrose 5%. 1000  dextrose 50% Injectable 12.5  dextrose 50% Injectable 25  dextrose 50% Injectable 25  docusate sodium 100  fluconAZOLE IVPB 400  heparin  Injectable 5000  insulin glargine Injectable (LANTUS) 15  insulin lispro (HumaLOG) corrective regimen sliding scale   insulin lispro Injectable (HumaLOG) 7  losartan 50      ANTIBIOTICS:  ampicillin/sulbactam  IVPB 3 Gram(s) IV Intermittent every 6 hours  fluconAZOLE IVPB 400 milliGRAM(s) IV Intermittent every 24 hours      All available historical records have been reviewed No

## 2024-01-01 NOTE — PROGRESS NOTE ADULT - PROVIDER SPECIALTY LIST ADULT
Your Child's Health  Two-Month-Old Visit                                                                    Naomi Ryan  2024    Visit Vitals  Temp 98.8 °F (37.1 °C) (Temporal)   Ht 23.25\" (59.1 cm)   Wt 5.613 kg (12 lb 6 oz)   HC 38 cm (14.96\")   BMI 16.10 kg/m²     Weight: 12.37 lbs      Your Child's 2 Month-Old Visit                                                       Key points at this age…    Car seat safety is critical! Make sure your baby is safely and properly riding in a rear-facing car seat.    Your baby continues to be safest when sleeping on their back in their own bed (a crib, bassinette or Pack-n-Play) at this age.     It’s normal to have periods of feeling exhausted and overwhelmed with a baby this age. Make sure to ask for help if you experiencing this sort of thing.     NUTRITION: If you are breastfeeding, keep it up! Breast milk or formula will provide all the nutrition your baby needs at this age, except for vitamin D. You should continue to give your baby a vitamin supplement (either a multivitamin or just pure vitamin D) every day if you are breastfeeding. (If you are feeding your baby formula at this age, they are likely getting enough vitamin D from the formula; supplementation with vitamin D is not necessary once babies are taking about 30 ounces of formula daily.)     DEVELOPMENT:  Most babies at this age will smile responsively and  and babble back at you when you talk to them. They will discover their hands and develop stronger neck muscles (which is why tummy time is still important--give your baby tummy time every day!). Talk, read and sing to your baby--hearing your voice often is very beneficial for your child's development (even at this young age!).     EMOTIONAL HEALTH:  Having a  is a joyous and exhausting experience. It is normal to be tired, but be aware that sometimes new moms can experience a true post-partum depression. This is different  from “baby blues” which describes moms who are feeling overwhelmed and fatigued for short periods of time. These feelings shouldn’t last for a long time, and these moms generally know that they love their baby and are looking forward to watching them grow. Post-partum depression, on the other hand, describes moms who are not just exhausted but feel hopeless about the future and are not excited about caring for their baby (and maybe not even able to fully care for their baby). Talk to your doctor if think you are experiencing more than just some “baby blues”. It can be helped, and if it goes on too long, it can have negative effects on you and your baby.     Try to find time to spend alone with your partner, even if it is brief. Likewise, try to spend some brief one-on-one time with your other children each day. Try to find simple, safe tasks that they can do to feel like they are helping.     Having regular routines (as much as possible) for feeding, bathing, etc. may help your household run a little more smoothly and be a little less stressful.     CAR SAFETY: Naomi should be secured in a rear-facing infant car safety seat whenever riding in the car for the next several months (until they outgrow the upper weight limits of the seat). This will protect your baby in case of an accident, plus it is the law. If you are shopping for a new car seat or just want to learn more about them, www.safercar.gov is a helpful website, as is the [place] DOT website (search for “car seats”). You can also search for local inspection stations at www.safercar.org or http://www.Pandoodle.SocialMadeSimple or at www.safekidswi.org. Studies show that car seats are installed incorrectly more often than they are installed correctly, so utilizing a certified car seat  can ensure your baby is as safely secured as possible. Remember that the seat straps need to be very snug to protect your baby in case of an accident (so no thick coats or snowsuits  Pulmonology while riding in the car during the winter!).     SAFE SLEEPING: The safest place for a baby to sleep at this age is in their own crib in their parent’s room. Naomi should always be placed on their back to sleep (not on their tummy or their side). This “back to sleep” position decreases the risk of crib death (SIDS). Continue to avoid loose, soft bedding (blankets, comforters, sheepskins, quilts, pillows, pillow-like bumper pads) and soft toys in the baby’s crib because they are a risk for suffocation (and SIDS). “Sleep sacks” and swaddling with thin blankets are okay. Cribs (including Pack-n-Plays) should be certified by [facility] (a safety agency for baby products). Safety criteria for cribs include: slats or bars no more than 2-3/8 inches (6cm) apart, a snug fitting mattress, and a distance of no less than 26 inches from the mattress to the top rail. If your crib has a drop-down side rail, it should always be kept all the way up and locked in place.     If you are frustrated with or worried about your baby’s sleeping, the website “The Period of PURPLE Crying” (search “purple crying”) has helpful (and reassuring) information about infant sleep and crying patterns.    PACIFIER USE: Pacifier use during sleep offers some protection against crib death (SIDS). There are safety standards for pacifiers; you can check at saferproducts.gov or kidsindanger.org to make sure the pacifier you choose has not had any safety recalls. Clean the pacifier often, and never coat it with anything sweet (honey or sugar). Offer it to your baby as they are going off to sleep, and if your child spits it out, don’t put it back in their mouth. And never attach it with a cord or string around your baby’s neck or wrist--this can cause dangerous strangulation injuries.     SAFETY:  1.   Falls:  Babies can start rolling at this age, so be careful not to leave Naomi alone on a surface they could roll off of. (If you have to walk away or put your baby  down suddenly, the safest place might be on the floor!)     2.   Scalding:  Adjust your hot-water heater to 120-125°F, or use the “low” setting. This will decrease the risk of scald burns (and save money on your utility bills!). It is also important to not carry anything hot (hot drinks, soup, pans) when holding your baby. As Naomi is getting more active, they are more likely to grab at what you are holding and cause a (dangerous!) spill.     3.   Walkers, Swings and Other Things:  Do not plan to buy a walker for your baby. They cause a lot of accidents, and they do not help your baby walk any sooner. Other options (stationary \"saucers\", bouncers or jumpers) may be a better choice when your baby is older. Swings may be appropriate for your baby at this age if you choose one for which you baby meets the recommended size (weight, length) criteria. You should always follow the  guidelines for use and never leave your baby unsupervised in a swing. Also, swings are NOT considered a safe sleeping place for babies.     4.   Toys:  Choose toys that have been approved for Naomi’s age and that encourage the development of appropriate motor skills (reaching, batting, grabbing). Make sure that other children in the home are not offering the baby their toys which may be inappropriate (e.g., small pieces that could cause choking, balloons, plastic bags).     5. Water Safety: Don't ever leave your baby alone in a bath or pool, even if they are secured in a seat.     6.   No smoking! Exposure to tobacco smoke puts children at risk for lots of problems (asthma and other severe breathing problems, crib death [SIDS], ear infections and even behavior and learning problems). Consider talking to your doctor about quitting smoking. If you can’t quit, keep all smoking outside the home (not just in another room), and all smokers should change their clothes and wash their hands before handling the baby.      &  BABYSITTING: Returning to work is a reality for many moms. If you need some guidance choosing a , the [place] Department of Children and Families offers tips to help you choose a childcare center. Go to their website and look under “Family Resources” for information.     If you are not planning a return to work, you still may feel ready for a break sometime. When preparing to leave your baby with a sitter, be sure to let them know where you will be going and leave a working phone number where you can be reached.  Make sure that whoever you are leaving your child with knows how to contact emergency resources if needed (911, Poison Control 1-763.273.3310). [facility] offers training courses to prepare babysitters to provide safe and responsible care for children.     MEDICATION FOR FEVER OR PAIN:   Acetaminophen liquid (e.g., Tylenol or Tempra or store brands) may be given as often every four hours if needed for pain or fever. (Remember this is ONLY a pain and fever medicine. It will not help cold symptoms, congestion, cough, etc.--ONLY give it for pain or fever.) (Ibuprofen should not be given until 6 months of age.)    Child’s Weight: Dose:  06 - 11 pounds:   40 mg (1.25 mL which is the same as 1/4 teaspoon)  12 - 17 pounds:   80 mg (2.5 mL which is the same as 1/2 teaspoon)    If Naomi is outside these weight ranges, call the office for dosing advice.    (Any acetaminophen product--whether it is labelled as for “INFANTS” or “CHILDREN”-- should have a concentration of 160mg/5ml. Check the label to make sure the product you are using has 160mg/5ml; if it does not, call the clinic for dosing instructions.)      Most Recent Immunizations   Administered Date(s) Administered    None   Deferred Date(s) Deferred    Hep B, adolescent or pediatric 2024         If aNomi received any immunizations today and they develop any of the following reactions within 72 hours after an immunization, call our clinic or  the after hours pediatric phone nurse:  1.   A temperature of 105 degrees or above.  2.   More than 3 hours of continuous crying.  3.   A shrill, high-pitched cry.  4.   A seizure or a fainting spell.  In this case, you should call 911 or proceed to the emergency room.    NEXT VISIT:  FOUR MONTHS OF AGE      Thank you for entrusting your care to [facility]!    Also, check out “Children’s Health” on the [facility] Blog for updates on timely topics regarding children’s health

## 2024-03-07 NOTE — DIETITIAN INITIAL EVALUATION ADULT. - HEIGHT IN FEET
FANNIE Martinez her sibling,  passed away last month  Calling to see if  you have any concerns regarding the children's care, Shira and Michelle.  You can call Jeri at the number provided or you can reply to the email I forwarded to you.  
5

## 2024-03-13 NOTE — CONSULT NOTE ADULT - I WAS PHYSICALLY PRESENT FOR THE KEY PORTIONS OF THE EVALUATION AND MANAGEMENT (E/M) SERVICE PROVIDED.  I AGREE WITH THE ABOVE HISTORY, PHYSICAL, AND PLAN WHICH I HAVE REVIEWED AND EDITED WHERE APPROPRIATE
Detail Level: Detailed
Body Location Override (Optional - Billing Will Still Be Based On Selected Body Map Location If Applicable): Left forehead
X Size Of Lesion In Cm (Optional): 0
Incorporate Mauc In Note: Yes
Statement Selected
Statement Selected

## 2024-03-27 NOTE — CONSULT NOTE ADULT - CONSULT REQUESTED BY NAME
A Ratio message has been sent to the patient for patient rounding for [facility].      ICU

## 2024-06-08 NOTE — ED PROVIDER NOTE - IV ALTEPLASE EXCL ABS HIDDEN
[facility] EMERGENCY DEPARTMENT ENCOUNTER:        Basic Information:  Patient: Rasheed Bello Age: 34 year old : 1989 Sex: female  MRN: .[id number] Encounter: 2024    PCP:  Honey Moy MD     Chief Complaint:    Chief Complaint   Patient presents with    Toe Pain         HPI:    34-year-old female presented to ED for evaluation of left great toe pain onset 1.5 weeks ago.  Patient reports her toenail became caught on something and was torn off.  Patient reports she had a fungal infection to that toenail prior to this. Since this occurred she has had increasing pain and swelling to the digit and she is concerned that a piece of the toenail remains and has become ingrown.  She currently rates her pain as 10/10.  Patient is 11 weeks pregnant.  Denies any fevers, chills or other complaints.      Allergies:    ALLERGIES:   Allergen Reactions    Benadryl Allergy SHORTNESS OF BREATH    Diphenhydramine RASH and SHORTNESS OF BREATH    Tramadol PRURITUS and SHORTNESS OF BREATH    Dicyclomine HIVES and RASH    Diphenhydramine Other (See Comments)    Diphenhydramine Hcl RASH    Fentanyl ANXIETY    Metoclopramide ANXIETY and NAUSEA    Metoclopramide Hcl ANXIETY    Reglan Other (See Comments)    Tramadol Other (See Comments)       Current Medications:    No current facility-administered medications on file prior to encounter.     Current Outpatient Medications on File Prior to Encounter   Medication Sig Dispense Refill    Prenatal Vit-Fe Fumarate-FA (multivitamin & mineral w/folic acid- PRENATAL) 27-1 MG Tab Take 1 tablet by mouth daily.      PROGESTERONE IM       aspirin 81 MG chewable tablet Chew 81 mg by mouth daily.      enoxaparin (LOVENOX) 40 MG/0.4ML injectable solution Inject 40 mg into the skin.      estradiol (ESTRACE) 2 MG tablet Take 2 mg by mouth daily.      metformin (GLUCOPHAGE) 1000 MG tablet Take 1,000 mg by mouth in the morning and 1,000 mg in the evening. Take with meals.         Past Medical  History:    Past Medical History:   Diagnosis Date    Anemia     Anemia     iron    Hyperplasia of cervix     Hyperplasia of cervix 08/01/2018    Hyperplasia of endometrium determined by biopsy     hyperplasia atypia    Ovarian torsion     PCOS (polycystic ovarian syndrome)        Surgical History:    Past Surgical History:   Procedure Laterality Date    Appendectomy      Cholecystectomy      Dilation and curettage of uterus      Gynecologic cryosurgery      Hysteroscopy      Removal gallbladder         Social History:    Social History     Tobacco Use    Smoking status: Never    Smokeless tobacco: Never   Vaping Use    Vaping status: never used   Substance Use Topics    Alcohol use: Not Currently     Comment: socially    Drug use: Never       Family History:    Family History   Family history unknown: Yes         PHYSICAL EXAM:   ED Triage Vitals [06/07/24 2022]   /80   Heart Rate (!) 103   Resp 20   Temp 98.2 °F (36.8 °C)   SpO2 97 %      General:  No acute distress, Awake, Alert, Well appearing.  Skin:  Warm, dry.  Head:  Normocephalic, atraumatic.  Neck: Supple, trachea midline. Normal cervical ROM  Eye:  EOMI, vision grossly normal.  Ears, nose, mouth and throat:  Oral mucosa moist   Cardiovascular:  Normal peripheral pulses bilaterally. No edema  Respiratory:  Respirations are non-labored, Symmetrical chest wall expansion. No resp distress  Musculoskeletal:  Normal ROM. Swelling and erythema to the medial aspect of the right great toe, no significant fluctuance, no drainage. Toenail is missing with white crusting to the nailbed.   Neurological:  A/Ox4   Psych: Cooperative, Normal judgement      Lab Results:  No results found for this visit on 06/07/24.    Radiology:  US OB LESS THAN 14 WEEKS FIRST TRIMESTER SINGLE FETUS   Final Result   Unremarkable live single intrauterine pregnancy as detailed above.      Unremarkable appearance of the ovaries.            Electronically Signed by: TANYA TOMLINSON  M.D.    Signed on: 6/8/2024 12:42 AM    Workstation ID: [id number]            Medications:  Medications   lidocaine HCl 2 % injection 100 mg (100 mg Infiltration Given 6/7/24 2216)       Reevaluations:  ED Course as of 06/08/24 1756   Fri Jun 07, 2024 2217 Patient reports that since arriving to the ED she has developed lower abdominal cramping, she denies any vaginal bleeding.  Will order an ultrasound. [AS]      ED Course User Index  [AS] Emerald Padron PA-C     Vitals:    06/07/24 2022 06/08/24 0043   BP: 114/80 110/78   Pulse: (!) 103 95   Resp: 20 16   Temp: 98.2 °F (36.8 °C)    SpO2: 97% 98%   Weight: 82.8 kg (182 lb 8.7 oz)    LMP: 01/09/2024     Vitals:    06/08/24 0043   Temp:    Pulse: 95   Resp: 16   SpO2: 98%   BP: 110/78        PROCEDURE NOTE - INCISION AND DRAINAGE OF ABSCESS    Procedure: Incision & Drainage of Abscess   Performed by: Physician/Self  Location: right great toe   The patient / caregivers were apprised of diagnostic / treatment options including alternate modes of care, in addition to risks and benefits, for this procedure. Based on this discussion the patient / caregiver agree with this procedure and verbal consent was obtained.     Prior to procedure a timeout was called and the correct patient and site and location was confirmed.     Area was cleansed using betadine.  Anesthesia: Digital block was administered using 1 % lidocaine.   Incision was made at the point of greatest fluctuance using a 18 gauge needle and no purulent fluid was drained. A 2nd stab incision was made and again, no purulent fluid was obtained  Packing: none  Sterile dressing applied.  Any bleeding was controlled.  The patient tolerated the procedure well and there were no complications.      Impression and Plan:  Multiple differential diagnoses were considered. The patient / caregivers were apprised of diagnostic / treatment options including alternate modes of care, in addition to risks and benefits, for  this medical condition. Based on this discussion the patient / caregiver agrees with this chosen diagnostic and treatment plan.    Medical Decision Making:   Pt to ED for evaluation of toe pain as noted in H&P. Pt denies injury to the area. Stab incision made, however, no purulent fluid was drained. Pt advised on warm water soaks. Given pregnancy and lack of improvement over past 1.5 weeks, will give abx and advised for podiatry follow-up.      Pt complaining of cramping in ED, no bleeding. Pelvic US reassuring. Advised on return precautions and OB follow-up        Diagnosis:  1. Paronychia of great toe, left        Condition:  IMPROVED and STABLE    Disposition:  Time: 12:36 AM      Discharged to Home .    Prescriptions:     Summary of your Discharge Medications        Take these Medications        Details   cephalexin 500 MG capsule  Commonly known as: KEFLEX   Take 1 capsule by mouth 4 times daily for 7 days.                Patient Care Instructions:   1. Yaenly instructions were provided for Paronychia.      Follow Up Plan:  1.    Follow-up Information       Follow up With Specialties Details Why Contact Info    Honey Moy MD Family [facility]   [address]  [place] [id number] [phone]      Elise Sanchez DPM Podiatry - Foot & Ankle Surgery  Podiatry [address]  [facility] [address] [phone]               2.  Follow up is needed :   > for re-examination.    3.  Recommended returning to the ED for any change in symptoms or status.      Counseled:  Patient, Family, Regarding diagnosis, Regarding diagnostic results, Regarding treatment, Patient understood, and Family understood      Prescriptions:  Discharge Medication List as of 6/8/2024 12:36 AM        START taking these medications    Details   cephalexin (KEFLEX) 500 MG capsule Take 1 capsule by mouth 4 times daily for 7 days.Eprescribe, Disp-28 capsule, R-0         .      Patient was seen  independently by me, with physician supervision available as necessary      JD Warren Amanda M, PA-C  06/08/24 4085     show

## 2024-11-11 NOTE — ED ADULT NURSE NOTE - DRUG PRE-SCREENING (DAST -1)
November 11, 2024      Zunilda Amador  [address] [place] [id number]        Dear Ms. Amador,    The results of your endoscopy and colonoscopy performed on 11/04/2024 have returned and indicate the following:      EGD with gastritis on examination (inflammation of the lining of the stomach). Biopsy of the stomach was normal without any signs of H. Pylori.     Continue Prilosec once daily in the morning and other recommendations as discussed at last office visit with Dr. Ferrari.          colonoscopy with internal hemorrhoids, diverticulosis, 1 benign polyp, and 1 tubular adenoma.     5 year repeat. Start taking Benefiber 1 tablespoon daily (fiber supplement) in a large glass of water.         Tubular Adenoma Polyp(s)  Tubular adenomas are growths of tissue in the colon that can be pre-cancerous.  Please note, if these polyps are not removed, over time they can lead to colon cancer.  Although your polyp(s) were removed during the procedure, these types of polyps can reoccur and other polyps may develop.    It is important for you to be re-screened in the future for any polyps that may re-occur. Colonoscopies remain the best examination for follow-up with patients who have had polyps removed.      Diverticulosis  Diverticulosis, or diverticular disease, occurs when small pockets or pouches form in the walls of the colon. It is believed that the small pouches (diverticula) form when pressure inside the colon builds and makes the wall bulge in spots where the colon may be weak.      One of the most common causes of this increased pressure is related to constipation. When waste material is hard, the muscles that contract to move the waste through your system have to squeeze harder, with more force, thus increasing the pressure.      Below are some lifestyle changes that may help you manage and/or prevent diverticulosis:  Eat more fiber and drink plenty of fluids. This will help to soften the stools and promote  regular elimination.  Don't ignore the urge to have a bowel movement. Delaying the urge can mean straining later which increases the pressure within the colon.    Exercise regularly, which aids in digestion.     Usually the small diverticula pouches don't cause any problems. Occasionally symptoms may be present including: Constipation, mild abdominal pain, cramping, diarrhea, or bloating. In rare cases the diverticula may become infected or bleed which you will notice changes in your stool, experience fever, chills, or abdominal pain. If bleeding occurs, see your doctor to assess and manage the bleeding.      Hemorrhoids  Hemorrhoids are formed by abnormal swelling in the blood vessels in the anal canal. They may bleed, itch, or cause pain. However, hemorrhoids are not usually a sign of anything more serious and may be treated at home.      Treating hemorrhoids may require nothing more than home treatment and lifestyle changes.  For instant relief of pain, itching, or swelling, try the following:  Apply a hemorrhoidal cream or suppository to the affected area as directed.   Use an ice pack, which can reduce the swelling.   Soak in a warm bath (\"sitz bath\" - sitting in 3 inches of water) for 10-15 minutes daily. Epsom salt may be added about 1/2 cup to water. Soaking in a mild temperature can help speed up the healing process by boosting blood flow. Pat yourself dry. Letting the area air dry is best by limiting irritation to the area.    More severe hemorrhoidal symptoms may require treatment by your doctor.      One of the most common causes of hemorrhoids is straining to pass hard, dry stools during bouts of constipation. Other factors that can lead to hemorrhoids include: straining to lift heavy objects, obesity, sitting for long periods, diarrhea, and pregnancy.    Following are some lifestyle changes that may help you manage and/or prevent hemorrhoids:  Eat more fiber and drink plenty of fluids. This will help  soften stools and promote regular elimination, which reduces straining.   Don't ignore the urge to have a bowel movement. Delaying now can mean straining later.    Don't read on the toilet. Sitting and straining too long encourages swelling.   If these changes do not help, your doctor may suggest a fiber laxative or stool softener.        A team member will send you a reminder when it is time for you to schedule another procedure in 5 years.    It has been a pleasure caring for you. If you have questions regarding these results or your plan of care, please feel free to contact us. You may either call and discuss these issues over the phone or schedule a follow-up office visit.              Sincerely,    Chato Jane MD  [facility] [address] [place] [id number] [phone]                            Statement Selected

## 2024-12-20 NOTE — AIRWAY PLACEMENT NOTE ADULT - BLADE USED:
HPI:    Patient is a 59-year-old white male presents back here to the emergency room after being seen in early Monday morning at 2 AM here in the emergency room for swelling and irritation of the left knee.  The patient states that he noticed on Sunday last week 5 days ago at 5 PM that he had a small area of redness in the anterior portion of his left knee that seem to be tender and swollen.  He states that it got worse in the night thus came into the emergency room.  He was given oral clindamycin and sent home.  According to the patient he states he did not have any blood work done or any injections.    REVIEW OF SYSTEMS  CONSTITUTIONAL:  No complaints of fever, chills,or weakness  EYES:  No complaints of discharge   ENT: No complaints of sore throat or ear pain  CARDIOVASCULAR:  No complaints of chest pain, palpitations, or swelling  RESPIRATORY:  No complaints of cough or shortness of breath  GI:  No complaints of abdominal pain, nausea, vomiting, or diarrhea  MUSCULOSKELETAL: Positive for left knee discomfort   SKIN: Positive for left knee skin redness   NEUROLOGIC:  No complaints of headache, focal weakness, or sensory changes  ENDOCRINE:  No complaints of polyuria or polydipsia  LYMPHATIC:  No complaints of swollen glands  GENITOURINARY: No complaints of urinary frequency or hematuria        PAST MEDICAL HISTORY  Past Medical History:   Diagnosis Date    Disease of thyroid gland     Elevated cholesterol     GERD (gastroesophageal reflux disease)     Hyperlipidemia     Hypertension     Irregular cardiac rhythm     Lipoma     Nephrolithiasis 6/18/2021       FAMILY HISTORY  Family History   Problem Relation Age of Onset    Hypertension Mother     Heart disease Mother     Stroke Father     No Known Problems Sister     No Known Problems Brother     No Known Problems Daughter     No Known Problems Son     Heart disease Maternal Grandmother     No Known Problems Paternal Grandmother     No Known Problems Maternal  Aunt     No Known Problems Paternal Aunt     BRCA 1/2 Neg Hx     Breast cancer Neg Hx     Colon cancer Neg Hx     Endometrial cancer Neg Hx     Ovarian cancer Neg Hx     Colon polyps Neg Hx        SOCIAL HISTORY  Social History     Socioeconomic History    Marital status: Single   Tobacco Use    Smoking status: Former     Current packs/day: 0.00     Average packs/day: 0.5 packs/day for 5.0 years (2.5 ttl pk-yrs)     Types: Cigarettes     Start date:      Quit date:      Years since quittin.9    Smokeless tobacco: Never    Tobacco comments:     34 YRS AGO   Vaping Use    Vaping status: Never Used   Substance and Sexual Activity    Alcohol use: No    Drug use: No    Sexual activity: Defer       IMMUNIZATION HISTORY  Deferred to primary care physician.    SURGICAL HISTORY  Past Surgical History:   Procedure Laterality Date    COLONOSCOPY N/A 2021    Procedure: COLONOSCOPY WITH ANESTHESIA;  Surgeon: Willian Bajwa DO;  Location: [facility] ENDOSCOPY;  Service: Gastroenterology;  Laterality: N/A;  preop; abdominal pain  postop; rectal ulcer   PCP Edgar Gomez     ENDOSCOPY N/A 2019    Procedure: ESOPHAGOGASTRODUODENOSCOPY WITH ANESTHESIA;  Surgeon: Willian Bajwa DO;  Location: [facility] ENDOSCOPY;  Service: Gastroenterology    EXTRACORPOREAL SHOCK WAVE LITHOTRIPSY (ESWL) Right 2021    Procedure: EXTRACORPOREAL SHOCKWAVE LITHOTRIPSY RIGHT;  Surgeon: Mega Muñoz MD;  Location: [facility] OR;  Service: Urology;  Laterality: Right;    GALLBLADDER SURGERY      LIPOMA EXCISION      TONSILLECTOMY      URETHRAL DILATATION N/A 2023    Procedure: CYSTOSCOPY, BALLOON URETHRAL DILATATION;  Surgeon: Mega Muñoz MD;  Location: [facility] OR;  Service: Urology;  Laterality: N/A;       CURRENT MEDICATIONS    Current Facility-Administered Medications:     lidocaine (XYLOCAINE) 2% injection 5 mL, 5 mL, Injection, Once, Richard Beltran APRN    Current Outpatient Medications:      "amitriptyline (ELAVIL) 50 MG tablet, Take 1 tablet by mouth Every Night., Disp: 30 tablet, Rfl: 2    aspirin 81 MG EC tablet, Take 1 tablet by mouth Daily., Disp: , Rfl:     divalproex (DEPAKOTE ER) 500 MG 24 hr tablet, Take 1 tablet by mouth Daily., Disp: 30 tablet, Rfl: 5    fenofibrate 160 MG tablet, Take 1 tablet by mouth Every Night., Disp: , Rfl:     levothyroxine (SYNTHROID, LEVOTHROID) 100 MCG tablet, Take 1 tablet by mouth Daily., Disp: , Rfl:     meloxicam (MOBIC) 15 MG tablet, Take 1 tablet by mouth Daily., Disp: 30 tablet, Rfl: 2    metoprolol succinate XL (TOPROL-XL) 50 MG 24 hr tablet, Take 1.5 tablets by mouth Daily., Disp: 45 tablet, Rfl: 11    OLANZapine (zyPREXA) 10 MG tablet, Take 1 tablet by mouth Every Night., Disp: , Rfl:     pantoprazole (PROTONIX) 40 MG EC tablet, Take 1 tablet by mouth Daily., Disp: , Rfl:     polyethylene glycol (MIRALAX) 17 GM/SCOOP powder, MIX 17 GRAMS OF POWDER IN 6-8 OUNCES IN DRINK OF CHOICE AND TAKE ONCE DAILY FOR CONSTIPATION, Disp: , Rfl:     sulfamethoxazole-trimethoprim (BACTRIM DS,SEPTRA DS) 800-160 MG per tablet, Take 1 tablet by mouth 2 (Two) Times a Day for 10 days., Disp: 20 tablet, Rfl: 0    tamsulosin (FLOMAX) 0.4 MG capsule 24 hr capsule, Take 1 capsule by mouth., Disp: , Rfl:     ALLERGIES  No Known Allergies    Musculoskeletal exam    VITAL SIGNS:   /94   Pulse 93   Temp 98.2 °F (36.8 °C) (Oral)   Resp 16   Ht 175.3 cm (69\")   Wt 76.9 kg (169 lb 8 oz)   SpO2 99%   BMI 25.03 kg/m²     Constitutional: Patient is alert and in no distress.  Patient with mild left anterior knee discomfort.    Cardiovascular: S1-S2 regular rate and rhythm. No murmurs, rubs or gallops are noted.    Respiratory: Patient is clear to auscultation bilaterally with no wheezing or rhonchi.  Chest wall is nontender.  There are no external lesions on the chest.  There is no crepitance    Abdomen: Soft, nontender. Bowel sounds are normal in all 4 quadrants. There is no " rebound or guarding noted.  There is no abdominal distention or hepatosplenomegaly.    Musculoskeletal: Left knee: There is no effusion.  Patient is able to fully extend and flex the knee with minimal pain.  There is anterior erythema noted over the anterior knee and over the patellar ligament area.    Integumentary: Positive erythema anterior knee of the patella ligament and proximal tibia area.  No drainage, no fluctuance is noted.    RADIOLOGY/PROCEDURES        No orders to display          FUTURE APPOINTMENTS     Future Appointments   Date Time Provider Department Center   1/16/2025  1:00 PM Kalen Alvarez APRN [facility]              COURSE & MEDICAL DECISION MAKING    Patient's partial differential diagnosis can include:    Left knee cellulitis, left knee bursitis, gout, pseudogout, and others    Patient's white count is normal but does have an elevated sed rate and CRP.  Will also give a dose of steroids IV x 1 for the inflammation.  Patient will be changed from clindamycin to Bactrim DS.  Patient to follow-up with PCP on Monday.  Patient advised to return back here to the emergency room if symptoms greatly worsen.      Patient's level of risk: Low      CRITICAL CARE    CRITICAL CARE: No    CRITICAL CARE TIME: None      Recent Results (from the past 24 hours)   Comprehensive Metabolic Panel    Collection Time: 12/19/24  9:45 PM    Specimen: Blood   Result Value Ref Range    Glucose 100 (H) 65 - 99 mg/dL    BUN 11 6 - 20 mg/dL    Creatinine 1.05 0.76 - 1.27 mg/dL    Sodium 140 136 - 145 mmol/L    Potassium 3.9 3.5 - 5.2 mmol/L    Chloride 101 98 - 107 mmol/L    CO2 29.0 22.0 - 29.0 mmol/L    Calcium 10.1 8.6 - 10.5 mg/dL    Total Protein 7.8 6.0 - 8.5 g/dL    Albumin 4.4 3.5 - 5.2 g/dL    ALT (SGPT) 22 1 - 41 U/L    AST (SGOT) 22 1 - 40 U/L    Alkaline Phosphatase 48 39 - 117 U/L    Total Bilirubin 0.2 0.0 - 1.2 mg/dL    Globulin 3.4 gm/dL    A/G Ratio 1.3 g/dL    BUN/Creatinine Ratio 10.5 7.0 - 25.0     Anion Gap 10.0 5.0 - 15.0 mmol/L    eGFR 82.8 >60.0 mL/min/1.73   Sedimentation Rate    Collection Time: 12/19/24  9:45 PM    Specimen: Blood   Result Value Ref Range    Sed Rate 25 (H) 0 - 20 mm/hr   C-reactive Protein    Collection Time: 12/19/24  9:45 PM    Specimen: Blood   Result Value Ref Range    C-Reactive Protein 13.20 (H) 0.00 - 0.50 mg/dL   Magnesium    Collection Time: 12/19/24  9:45 PM    Specimen: Blood   Result Value Ref Range    Magnesium 2.2 1.6 - 2.6 mg/dL   Valproic Acid Level, Total    Collection Time: 12/19/24  9:45 PM    Specimen: Blood   Result Value Ref Range    Valproic Acid <2.8 (L) 50.0 - 125.0 mcg/mL   Lactic Acid, Plasma    Collection Time: 12/19/24  9:45 PM    Specimen: Blood   Result Value Ref Range    Lactate 1.1 0.5 - 2.0 mmol/L   CBC Auto Differential    Collection Time: 12/19/24  9:45 PM    Specimen: Blood   Result Value Ref Range    WBC 9.11 3.40 - 10.80 10*3/mm3    RBC 4.69 4.14 - 5.80 10*6/mm3    Hemoglobin 14.1 13.0 - 17.7 g/dL    Hematocrit 41.2 37.5 - 51.0 %    MCV 87.8 79.0 - 97.0 fL    MCH 30.1 26.6 - 33.0 pg    MCHC 34.2 31.5 - 35.7 g/dL    RDW 13.2 12.3 - 15.4 %    RDW-SD 42.4 37.0 - 54.0 fl    MPV 9.4 6.0 - 12.0 fL    Platelets 339 140 - 450 10*3/mm3    Neutrophil % 70.6 42.7 - 76.0 %    Lymphocyte % 17.7 (L) 19.6 - 45.3 %    Monocyte % 8.8 5.0 - 12.0 %    Eosinophil % 2.2 0.3 - 6.2 %    Basophil % 0.3 0.0 - 1.5 %    Immature Grans % 0.4 0.0 - 0.5 %    Neutrophils, Absolute 6.43 1.70 - 7.00 10*3/mm3    Lymphocytes, Absolute 1.61 0.70 - 3.10 10*3/mm3    Monocytes, Absolute 0.80 0.10 - 0.90 10*3/mm3    Eosinophils, Absolute 0.20 0.00 - 0.40 10*3/mm3    Basophils, Absolute 0.03 0.00 - 0.20 10*3/mm3    Immature Grans, Absolute 0.04 0.00 - 0.05 10*3/mm3    nRBC 0.0 0.0 - 0.2 /100 WBC   Uric Acid    Collection Time: 12/19/24  9:45 PM    Specimen: Blood   Result Value Ref Range    Uric Acid 3.1 (L) 3.4 - 7.0 mg/dL       Also  Old charts were reviewed per [facility] EMR.  Pertinent  details are summarized above.  All laboratory, radiologic, and EKG studies that were performed in the Emergency Department were a necessary part of the evaluation needed to exclude unstable or  emergent medical conditions.     Patient was hemodynamically and neurologically stable in the ED.   Pertinent studies were reviewed as above.     The patient received:  Medications   vancomycin IVPB 1500 mg in 0.9% NaCl (Premix) 500 mL (0 mg Intravenous Stopped 12/19/24 3774)   dexAMETHasone (DECADRON) injection 10 mg (10 mg Intravenous Given 12/19/24 7908)            ED Disposition       ED Disposition   Discharge    Condition   Stable    Comment   --                 Dragon disclaimer:  Part of this note may be an electronic transcription/translation of spoken language to printed text using the Dragon Dictation System.     I have reviewed the patient’s prescription history via a prescription monitoring program.  This information is consistent with my knowledge of the patient’s controlled substance use history.    Patient evaluated during Coronavirus Pandemic. Isolation practices followed according to [facility] policy.    FINAL IMPRESSION   Diagnosis Plan   1. Cellulitis of left knee              MD Miguel Angel Marshall Jr, Thomas Mark Jr., MD  12/20/24 0156     Glyde 3.0

## 2025-01-08 NOTE — CONSULT NOTE ADULT - SUBJECTIVE AND OBJECTIVE BOX
Pt requesting 90 day, I sent as requested   Chief Complaint: urinary retention    HPI: 67 yo P3 postmenopausal, w/ PMD T2DM, hip fracture s/p repair admitted for lower GI bleeding and urinary retention. GYN consulted for bladder prolapse resulting in urinary retention. Patient reports bladder prolapse for the past 15yrs, never experienced retention or incontinence, never seen by urogyn. Pt has been taking opiods since hip fx repair 8/3 and has had retention and constipation since this time. Found to have hemorrhoids and significant stool retention s/p fecal disimpaction in ED. Has had normal BMs since this time. No evidence of GI bleed on CTA. Plan for colonoscopy 9/3. Pt had montejo placed in ED , removed . Failed TOV at this time, montejo replaced and >1L urine drained. Denies fever, chills, CP, SOB, N/V, abdominal pain, diarrhea, dysuria, urgency/frequency, vaginal bleeding/discharge.     Ob/Gyn History:  , NSVDx3                LMP -   55yo  Denies history of ovarian cysts, uterine fibroids, abnormal paps, or STIs  Last Pap Smear - many years ago, all normal per pt  Last Mammogram - never  Last Colonoscopy - many years ago, normal per pt; pt scheduled for colonoscopy 9/3    Denies the following: constitutional symptoms, visual symptoms, cardiovascular symptoms, respiratory symptoms, GI symptoms, musculoskeletal symptoms, skin symptoms, neurologic symptoms, hematologic symptoms, allergic symptoms, psychiatric symptoms  Except any pertinent positives listed.     Home Medications:  aspirin 81 mg oral tablet: 1 tab(s) orally once a day (29 Aug 2019 20:19)      Allergies: No Known Allergies    PAST MEDICAL & SURGICAL HISTORY:  Female bladder prolapse  Diabetes  History of repair of hip fracture      FAMILY HISTORY:  Stomach Cancer (mother,  @33yo)  Breast Cancer (in 3 aunts, diagnosed in 60-70s)    SOCIAL HISTORY: Denies current cigarette use, alcohol use, or illicit drug use  History of tobacco use, quit 13yrs ago    Vital Signs Last 24 Hrs  T(F): 96 (01 Sep 2019 07:06), Max: 97.6 (31 Aug 2019 13:31)  HR: 87 (01 Sep 2019 07:06) (87 - 99)  BP: 143/60 (01 Sep 2019 07:06) (113/61 - 143/60)  RR: 20 (01 Sep 2019 07:06) (20 - 20)    General Appearance - AAOx3, NAD  Heart - S1S2 regular rate and rhythm  Lung - CTA Bilaterally  Abdomen - Soft, nontender, nondistended, no rebound, no rigidity, no guarding, bowel sounds present    GYN/Pelvis:    Labia Majora - Normal  Labia Minora - Normal  Clitoris - Normal  Urethra - Normal  Vagina - Normal  Cervix - Normal    Uterus:  Size - Normal  Tenderness - None  Mass - None  Freely mobile    Adnexa:  Masses - None  Tenderness - None      LABS:                        9.4    5.60  )-----------( 379      ( 01 Sep 2019 06:10 )             30.5         09    143  |  101  |  20  ----------------------------<  152<H>  4.7   |  28  |  0.8    Ca    10.4<H>      01 Sep 2019 06:10  Mg     2.3         TPro  7.4  /  Alb  4.1  /  TBili  0.3  /  DBili  x   /  AST  9   /  ALT  <5  /  AlkPhos  107        Urinalysis Basic - ( 01 Sep 2019 04:20 )    Color: Light Yellow / Appearance: Slightly Turbid / S.009 / pH: x  Gluc: x / Ketone: Negative  / Bili: Negative / Urobili: <2 mg/dL   Blood: x / Protein: 30 mg/dL / Nitrite: Negative   Leuk Esterase: Large / RBC: 1 /HPF /  /HPF   Sq Epi: x / Non Sq Epi: 0 /HPF / Bacteria: Many        Culture - Urine (collected 19 @ 07:42)  Source: .Urine Catheterized  Preliminary Report (19 @ 17:35):    >100,000 CFU/ml Gram Negative Rods        RADIOLOGY & ADDITIONAL STUDIES:  < from: US Urinary Bladder (19 @ 05:28) >  FINDINGS:    Prevoid volume of approximately 1740 mL. Small intraluminal debris is   present. Bilateral ureteral jets are visualized. Postvoid volume is   approximately 1155 mL.    IMPRESSION:    Markedly distended bladder with postvoid residual volume of 1155 mL,   compatible with urinary retention    Nonspecific debris within the urinary bladder. Correlate with urinalysis.    < end of copied text >    < from: CT Angio Abdomen and Pelvis w/ IV Cont (19 @ 17:18) >  FINDINGS:    LOWER CHEST: Normal size heart. Dependent atelectasis.    HEPATOBILIARY:  Cholelithiasis in a distended gallbladder. Subcentimeter   right hepatic hypodensity to small to characterize.    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    PANCREAS: Unremarkable.    ADRENAL GLANDS: Unremarkable.    KIDNEYS: 1.5 cm indeterminate hypoattenuating left renal lesion (series 7   image 43) measuring higher than simple fluid in attenuation even on   noncontrast images. Additional hypodensities too small to characterize.   Bilateral extrarenal pelves. No hydronephrosis.    ABDOMINOPELVIC NODES: No lymphadenopathy.    PELVIC ORGANS: Markedly distended urinary bladder.    PERITONEUM/MESENTERY/BOWEL: No CT evidence of contrast extravasation into   the GI tract. Moderate stool in the colon. No bowel obstruction, ascites   or intraperitoneal free air.    BONES/SOFT TISSUES: Status post left intramedullary nail for   intertrochanteric fracture. Fracture line remains visible.    VASCULAR: Atherosclerotic calcifications.      IMPRESSION:    1.  No CTA evidence of acute gastrointestinal hemorrhage.  2.  Markedly distended urinary bladder. Correlate for urinary retention.  3.  Indeterminate left upper pole renal lesion. Further evaluation with   contrast-enhanced MRI on a nonemergent basis recommended.    < end of copied text >    < from: Xray Chest 1 View- PORTABLE-Urgent (19 @ 15:54) >  Findings:    Support devices: None.    Cardiac/mediastinum/hilum: Unremarkable.    Lung parenchyma/Pleura: Within normal limits.    Skeleton/soft tissues: Unremarkable.    Impression:      No radiographic evidence of acute cardiopulmonary disease.    Unchanged.    < end of copied text > Chief Complaint: urinary retention    HPI: 67 yo P3 postmenopausal, w/ PMD T2DM, hip fracture s/p repair admitted for lower GI bleeding and urinary retention. GYN consulted for bladder prolapse resulting in urinary retention. Patient reports prolapse for the past 15yrs, never experienced retention or incontinence, never seen by urogyn. Does require occasional splinting to void and have a BM. Pt has been taking opiods since hip fx repair 8/3 and has had retention and constipation since this time. Found to have hemorrhoids and significant stool retention s/p fecal disimpaction in ED. Has had normal BMs since this time. No evidence of GI bleed on CTA. Plan for colonoscopy 9/3. Pt had montejo placed in ED , removed . Failed TOV at this time, montejo replaced and >1L urine drained. Denies fever, chills, CP, SOB, N/V, abdominal pain, diarrhea, dysuria, urgency/frequency, vaginal bleeding/discharge.    Ob/Gyn History:  , NSVDx3                LMP -   55yo  Denies history of ovarian cysts, uterine fibroids, abnormal paps, or STIs  Last Pap Smear - many years ago, all normal per pt  Last Mammogram - never  Last Colonoscopy - many years ago, normal per pt; pt scheduled for colonoscopy 9/3    Denies the following: constitutional symptoms, visual symptoms, cardiovascular symptoms, respiratory symptoms, GI symptoms, musculoskeletal symptoms, skin symptoms, neurologic symptoms, hematologic symptoms, allergic symptoms, psychiatric symptoms  Except any pertinent positives listed.     Home Medications:  aspirin 81 mg oral tablet: 1 tab(s) orally once a day (29 Aug 2019 20:19)      Allergies: No Known Allergies    PAST MEDICAL & SURGICAL HISTORY:  Female bladder prolapse  Diabetes  History of repair of hip fracture      FAMILY HISTORY:  Stomach Cancer (mother,  @35yo)  Breast Cancer (in 3 aunts, diagnosed in 60-70s)    SOCIAL HISTORY: Denies current cigarette use, alcohol use, or illicit drug use  History of tobacco use, quit 13yrs ago    Vital Signs Last 24 Hrs  T(F): 96 (01 Sep 2019 07:06), Max: 97.6 (31 Aug 2019 13:31)  HR: 87 (01 Sep 2019 07:06) (87 - 99)  BP: 143/60 (01 Sep 2019 07:06) (113/61 - 143/60)  RR: 20 (01 Sep 2019 07:06) (20 - 20)    General Appearance - AAOx3, NAD  Heart - S1S2 regular rate and rhythm  Lung - CTA Bilaterally  Abdomen - Soft, nontender, nondistended, no rebound, no rigidity, no guarding, bowel sounds present    GYN/Pelvis:    Labia Majora - Normal  Labia Minora - Normal  Clitoris - Normal  Urethra - montejo in place  cervix apparent 3-4cm distal from hymenal ring, prolapse apical/anterior, easily reducible likely stage III    Uterus:  Size - Normal  Tenderness - None  Mass - None  Freely mobile    Adnexa:  Masses - None  Tenderness - None      LABS:                        9.4    5.60  )-----------( 379      ( 01 Sep 2019 06:10 )             30.5         09-    143  |  101  |  20  ----------------------------<  152<H>  4.7   |  28  |  0.8    Ca    10.4<H>      01 Sep 2019 06:10  Mg     2.3         TPro  7.4  /  Alb  4.1  /  TBili  0.3  /  DBili  x   /  AST  9   /  ALT  <5  /  AlkPhos  107        Urinalysis Basic - ( 01 Sep 2019 04:20 )    Color: Light Yellow / Appearance: Slightly Turbid / S.009 / pH: x  Gluc: x / Ketone: Negative  / Bili: Negative / Urobili: <2 mg/dL   Blood: x / Protein: 30 mg/dL / Nitrite: Negative   Leuk Esterase: Large / RBC: 1 /HPF /  /HPF   Sq Epi: x / Non Sq Epi: 0 /HPF / Bacteria: Many        Culture - Urine (collected 19 @ 07:42)  Source: .Urine Catheterized  Preliminary Report (19 @ 17:35):    >100,000 CFU/ml Gram Negative Rods        RADIOLOGY & ADDITIONAL STUDIES:  < from: US Urinary Bladder (19 @ 05:28) >  FINDINGS:    Prevoid volume of approximately 1740 mL. Small intraluminal debris is   present. Bilateral ureteral jets are visualized. Postvoid volume is   approximately 1155 mL.    IMPRESSION:    Markedly distended bladder with postvoid residual volume of 1155 mL,   compatible with urinary retention    Nonspecific debris within the urinary bladder. Correlate with urinalysis.    < end of copied text >    < from: CT Angio Abdomen and Pelvis w/ IV Cont (19 @ 17:18) >  FINDINGS:    LOWER CHEST: Normal size heart. Dependent atelectasis.    HEPATOBILIARY:  Cholelithiasis in a distended gallbladder. Subcentimeter   right hepatic hypodensity to small to characterize.    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    PANCREAS: Unremarkable.    ADRENAL GLANDS: Unremarkable.    KIDNEYS: 1.5 cm indeterminate hypoattenuating left renal lesion (series 7   image 43) measuring higher than simple fluid in attenuation even on   noncontrast images. Additional hypodensities too small to characterize.   Bilateral extrarenal pelves. No hydronephrosis.    ABDOMINOPELVIC NODES: No lymphadenopathy.    PELVIC ORGANS: Markedly distended urinary bladder.    PERITONEUM/MESENTERY/BOWEL: No CT evidence of contrast extravasation into   the GI tract. Moderate stool in the colon. No bowel obstruction, ascites   or intraperitoneal free air.    BONES/SOFT TISSUES: Status post left intramedullary nail for   intertrochanteric fracture. Fracture line remains visible.    VASCULAR: Atherosclerotic calcifications.      IMPRESSION:    1.  No CTA evidence of acute gastrointestinal hemorrhage.  2.  Markedly distended urinary bladder. Correlate for urinary retention.  3.  Indeterminate left upper pole renal lesion. Further evaluation with   contrast-enhanced MRI on a nonemergent basis recommended.    < end of copied text >    < from: Xray Chest 1 View- PORTABLE-Urgent (19 @ 15:54) >  Findings:    Support devices: None.    Cardiac/mediastinum/hilum: Unremarkable.    Lung parenchyma/Pleura: Within normal limits.    Skeleton/soft tissues: Unremarkable.    Impression:      No radiographic evidence of acute cardiopulmonary disease.    Unchanged.    < end of copied text > Chief Complaint: urinary retention    HPI: 67 yo P3 postmenopausal, w/ PMD T2DM, hip fracture s/p repair admitted for lower GI bleeding and urinary retention. GYN consulted for bladder prolapse resulting in urinary retention. Patient reports prolapse for the past 15yrs, never experienced retention or incontinence, never seen by urogyn. Does require occasional splinting to void and have a BM. Pt has been taking opiods since hip fx repair 8/3 and has had retention and constipation since this time. Found to have hemorrhoids and significant stool retention s/p fecal disimpaction in ED. Has had normal BMs since this time. No evidence of GI bleed on CTA. Plan for colonoscopy 9/3. Pt had montejo placed in ED  1700cc drained, removed . Failed TOV at this time, montejo replaced and >1L urine drained. Denies fever, chills, CP, SOB, N/V, abdominal pain, diarrhea, dysuria, urgency/frequency, vaginal bleeding/discharge.    Ob/Gyn History:  , NSVDx3                LMP -   53yo  Denies history of ovarian cysts, uterine fibroids, abnormal paps, or STIs  Last Pap Smear - many years ago, all normal per pt  Last Mammogram - never  Last Colonoscopy - many years ago, normal per pt; pt scheduled for colonoscopy 9/3    Denies the following: constitutional symptoms, visual symptoms, cardiovascular symptoms, respiratory symptoms, GI symptoms, musculoskeletal symptoms, skin symptoms, neurologic symptoms, hematologic symptoms, allergic symptoms, psychiatric symptoms  Except any pertinent positives listed.     Home Medications:  aspirin 81 mg oral tablet: 1 tab(s) orally once a day (29 Aug 2019 20:19)      Allergies: No Known Allergies    PAST MEDICAL & SURGICAL HISTORY:  Female bladder prolapse  Diabetes  History of repair of hip fracture      FAMILY HISTORY:  Stomach Cancer (mother,  @35yo)  Breast Cancer (in 3 aunts, diagnosed in 60-70s)    SOCIAL HISTORY: Denies current cigarette use, alcohol use, or illicit drug use  History of tobacco use, quit 13yrs ago    Vital Signs Last 24 Hrs  T(F): 96 (01 Sep 2019 07:06), Max: 97.6 (31 Aug 2019 13:31)  HR: 87 (01 Sep 2019 07:06) (87 - 99)  BP: 143/60 (01 Sep 2019 07:06) (113/61 - 143/60)  RR: 20 (01 Sep 2019 07:06) (20 - 20)    General Appearance - AAOx3, NAD  Heart - S1S2 regular rate and rhythm  Lung - CTA Bilaterally  Abdomen - Soft, nontender, nondistended, no rebound, no rigidity, no guarding, bowel sounds present    GYN/Pelvis:    Labia Majora - Normal  Labia Minora - Normal  Clitoris - Normal  Urethra - montejo in place  Apical/anterior prolapse apparent, likely stage 3 (cervix 4cm distal to hymenal ring). No ulcers apparent. No bleeding or discharge    Uterus:  Size - Normal  Tenderness - None  Mass - None  Freely mobile    Adnexa:  Masses - None  Tenderness - None      LABS:                        9.4    5.60  )-----------( 379      ( 01 Sep 2019 06:10 )             30.5             143  |  101  |  20  ----------------------------<  152<H>  4.7   |  28  |  0.8    Ca    10.4<H>      01 Sep 2019 06:10  Mg     2.3         TPro  7.4  /  Alb  4.1  /  TBili  0.3  /  DBili  x   /  AST  9   /  ALT  <5  /  AlkPhos  107        Urinalysis Basic - ( 01 Sep 2019 04:20 )    Color: Light Yellow / Appearance: Slightly Turbid / S.009 / pH: x  Gluc: x / Ketone: Negative  / Bili: Negative / Urobili: <2 mg/dL   Blood: x / Protein: 30 mg/dL / Nitrite: Negative   Leuk Esterase: Large / RBC: 1 /HPF /  /HPF   Sq Epi: x / Non Sq Epi: 0 /HPF / Bacteria: Many        Culture - Urine (collected 19 @ 07:42)  Source: .Urine Catheterized  Preliminary Report (19 @ 17:35):    >100,000 CFU/ml Gram Negative Rods        RADIOLOGY & ADDITIONAL STUDIES:  < from: US Urinary Bladder (19 @ 05:28) >  FINDINGS:    Prevoid volume of approximately 1740 mL. Small intraluminal debris is   present. Bilateral ureteral jets are visualized. Postvoid volume is   approximately 1155 mL.    IMPRESSION:    Markedly distended bladder with postvoid residual volume of 1155 mL,   compatible with urinary retention    Nonspecific debris within the urinary bladder. Correlate with urinalysis.    < end of copied text >    < from: CT Angio Abdomen and Pelvis w/ IV Cont (19 @ 17:18) >  FINDINGS:    LOWER CHEST: Normal size heart. Dependent atelectasis.    HEPATOBILIARY:  Cholelithiasis in a distended gallbladder. Subcentimeter   right hepatic hypodensity to small to characterize.    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    PANCREAS: Unremarkable.    ADRENAL GLANDS: Unremarkable.    KIDNEYS: 1.5 cm indeterminate hypoattenuating left renal lesion (series 7   image 43) measuring higher than simple fluid in attenuation even on   noncontrast images. Additional hypodensities too small to characterize.   Bilateral extrarenal pelves. No hydronephrosis.    ABDOMINOPELVIC NODES: No lymphadenopathy.    PELVIC ORGANS: Markedly distended urinary bladder.    PERITONEUM/MESENTERY/BOWEL: No CT evidence of contrast extravasation into   the GI tract. Moderate stool in the colon. No bowel obstruction, ascites   or intraperitoneal free air.    BONES/SOFT TISSUES: Status post left intramedullary nail for   intertrochanteric fracture. Fracture line remains visible.    VASCULAR: Atherosclerotic calcifications.      IMPRESSION:    1.  No CTA evidence of acute gastrointestinal hemorrhage.  2.  Markedly distended urinary bladder. Correlate for urinary retention.  3.  Indeterminate left upper pole renal lesion. Further evaluation with   contrast-enhanced MRI on a nonemergent basis recommended.    < end of copied text >    < from: Xray Chest 1 View- PORTABLE-Urgent (19 @ 15:54) >  Findings:    Support devices: None.    Cardiac/mediastinum/hilum: Unremarkable.    Lung parenchyma/Pleura: Within normal limits.    Skeleton/soft tissues: Unremarkable.    Impression:      No radiographic evidence of acute cardiopulmonary disease.    Unchanged.    < end of copied text >

## 2025-01-15 NOTE — PROGRESS NOTE ADULT - SUBJECTIVE AND OBJECTIVE BOX
[place] [facility] FOLLOW UP NOTE  --------------------------------------------------------------------------------  24 hour events/subjective: Patient examined. Trached.    PAST HISTORY  --------------------------------------------------------------------------------  No significant changes to PMH, PSH, FHx, SHx, unless otherwise noted    ALLERGIES & MEDICATIONS  --------------------------------------------------------------------------------  Allergies    No Known Allergies    Standing Inpatient Medications  aMIOdarone    Tablet 200 milliGRAM(s) Oral daily  atorvastatin 40 milliGRAM(s) Oral at bedtime  BACItracin   Ointment 1 Application(s) Topical every 12 hours  BACItracin   Ointment 1 Application(s) Topical every 12 hours  chlorhexidine 0.12% Liquid 15 milliLiter(s) Oral Mucosa every 12 hours  collagenase Ointment 1 Application(s) Topical every 12 hours  dextrose 40% Gel 15 Gram(s) Oral once  dextrose 5%. 1000 milliLiter(s) IV Continuous <Continuous>  dextrose 5%. 1000 milliLiter(s) IV Continuous <Continuous>  dextrose 50% Injectable 25 Gram(s) IV Push once  dextrose 50% Injectable 12.5 Gram(s) IV Push once  dextrose 50% Injectable 25 Gram(s) IV Push once  doxercalciferol Injectable 2 MICROGram(s) IV Push <User Schedule>  epoetin mana-epbx (RETACRIT) Injectable 90651 Unit(s) IV Push <User Schedule>  estrogens  Cream 1 Gram(s) Vaginal at bedtime  glucagon  Injectable 1 milliGRAM(s) IntraMuscular once  heparin  Infusion 1100 Unit(s)/Hr IV Continuous <Continuous>  insulin lispro (ADMELOG) corrective regimen sliding scale   SubCutaneous Before meals and at bedtime  lactulose Syrup 15 Gram(s) Oral three times a day  levETIRAcetam  IVPB 250 milliGRAM(s) IV Intermittent <User Schedule>  levETIRAcetam  IVPB 375 milliGRAM(s) IV Intermittent every 12 hours  midodrine 5 milliGRAM(s) Oral every 8 hours  pantoprazole  Injectable 40 milliGRAM(s) IV Push daily  polyethylene glycol 3350 17 Gram(s) Oral at bedtime  senna 2 Tablet(s) Oral at bedtime  sertraline 50 milliGRAM(s) Oral daily  valproic  acid Syrup 250 milliGRAM(s) Oral two times a day    PRN Inpatient Medications  acetaminophen   Tablet .. 650 milliGRAM(s) Oral every 6 hours PRN  HYDROmorphone  Injectable 0.5 milliGRAM(s) IV Push every 4 hours PRN    VITALS/PHYSICAL EXAM  --------------------------------------------------------------------------------  T(C): 36.7 (04-28-21 @ 08:00), Max: 37.2 (04-27-21 @ 16:00)  HR: 110 (04-28-21 @ 10:00) (100 - 126)  BP: 100/65 (04-28-21 @ 10:00) (96/62 - 132/80)  RR: 98 (04-28-21 @ 10:00) (18 - 104)  SpO2: 100% (04-28-21 @ 10:00) (98% - 100%)    04-27-21 @ 07:01  -  04-28-21 @ 07:00  --------------------------------------------------------  IN: 1868 mL / OUT: 2020 mL / NET: -152 mL    04-28-21 @ 07:01  -  04-28-21 @ 12:37  --------------------------------------------------------  IN: 48 mL / OUT: 0 mL / NET: 48 mL    Physical Exam:  	Gen: Trached  	Pulm: CTA B/L  	CV: S1S2  	Abd: +BS, soft, nontender/nondistended  	: Ravinder  	LE: Warm, no edema  	Vascular access: TDC    LABS/STUDIES  --------------------------------------------------------------------------------              8.6    11.34 >-----------<  278      [04-28-21 @ 04:36]              29.7     144  |  106  |  30  ----------------------------<  123      [04-28-21 @ 04:36]  4.6   |  25  |  2.6        Ca     8.7     [04-28-21 @ 04:36]      Mg     2.4     [04-28-21 @ 04:36]    TPro  5.5  /  Alb  2.7  /  TBili  0.4  /  DBili  x   /  AST  12  /  ALT  <5  /  AlkPhos  159  [04-28-21 @ 04:36]      PTT: 62.8       [04-28-21 @ 11:18]      Creatinine Trend:  SCr 2.6 [04-28 @ 04:36]  SCr 3.5 [04-27 @ 04:31]  SCr 2.8 [04-26 @ 04:30]  SCr 2.5 [04-25 @ 04:30]  SCr 3.1 [04-24 @ 04:30]    Urinalysis - [04-02-21 @ 10:44]      Color Yellow / Appearance Turbid / SG 1.027 / pH 7.0      Gluc Negative / Ketone Negative  / Bili Negative / Urobili <2 mg/dL       Blood Moderate / Protein 100 mg/dL / Leuk Est Large / Nitrite Negative      RBC 14 / WBC >720 / Hyaline 16 / Gran  / Sq Epi  / Non Sq Epi 3 / Bacteria Moderate      Iron 55, TIBC 167, %sat 33      [05-27-20 @ 13:22]  Ferritin 636      [05-27-20 @ 13:22]  HbA1c 5.7      [11-09-19 @ 01:20]  TSH 1.71      [04-06-21 @ 04:40]       68

## 2025-02-20 NOTE — ED ADULT TRIAGE NOTE - CHIEF COMPLAINT QUOTE
pt prtesents to ED s/p flal yesterday [place] [facility] [place] pt on aspirin ; denies hitting head denies LOC; pt suppose to drecieve dialysis today None known
